# Patient Record
Sex: FEMALE | Race: WHITE | NOT HISPANIC OR LATINO | Employment: OTHER | URBAN - METROPOLITAN AREA
[De-identification: names, ages, dates, MRNs, and addresses within clinical notes are randomized per-mention and may not be internally consistent; named-entity substitution may affect disease eponyms.]

---

## 2017-01-04 ENCOUNTER — ANTI-COAG VISIT (OUTPATIENT)
Dept: CARDIOLOGY | Facility: CLINIC | Age: 67
End: 2017-01-04

## 2017-01-04 DIAGNOSIS — Z79.01 LONG TERM CURRENT USE OF ANTICOAGULANT THERAPY: ICD-10-CM

## 2017-01-04 LAB — INR PPP: 1.5

## 2017-01-05 ENCOUNTER — DOCUMENTATION ONLY (OUTPATIENT)
Dept: FAMILY MEDICINE | Facility: CLINIC | Age: 67
End: 2017-01-05

## 2017-01-10 ENCOUNTER — DOCUMENTATION ONLY (OUTPATIENT)
Dept: FAMILY MEDICINE | Facility: CLINIC | Age: 67
End: 2017-01-10

## 2017-01-10 RX ORDER — IPRATROPIUM BROMIDE AND ALBUTEROL SULFATE 2.5; .5 MG/3ML; MG/3ML
SOLUTION RESPIRATORY (INHALATION)
Qty: 90 ML | Refills: 0 | Status: SHIPPED | OUTPATIENT
Start: 2017-01-10 | End: 2017-04-17 | Stop reason: SDUPTHER

## 2017-01-10 NOTE — PROGRESS NOTES
Pre-Visit Chart Review  For Appointment Scheduled on 01/11/2017    Health Maintenance Due   Topic Date Due    DEXA SCAN  11/15/1991    Zoster Vaccine  11/15/2011    Colonoscopy  02/01/2016

## 2017-01-11 ENCOUNTER — DOCUMENTATION ONLY (OUTPATIENT)
Dept: FAMILY MEDICINE | Facility: CLINIC | Age: 67
End: 2017-01-11

## 2017-01-11 ENCOUNTER — ANTI-COAG VISIT (OUTPATIENT)
Dept: CARDIOLOGY | Facility: CLINIC | Age: 67
End: 2017-01-11

## 2017-01-11 DIAGNOSIS — Z79.01 LONG TERM CURRENT USE OF ANTICOAGULANT THERAPY: ICD-10-CM

## 2017-01-11 LAB — INR PPP: 2.6

## 2017-01-11 NOTE — PROGRESS NOTES
Pre-Visit Chart Review  For Appointment Scheduled on 01/12/2017    Health Maintenance Due   Topic Date Due    DEXA SCAN  11/15/1991    Zoster Vaccine  11/15/2011    Colonoscopy  02/01/2016

## 2017-01-12 ENCOUNTER — OFFICE VISIT (OUTPATIENT)
Dept: FAMILY MEDICINE | Facility: CLINIC | Age: 67
End: 2017-01-12
Payer: MEDICARE

## 2017-01-12 VITALS
WEIGHT: 293 LBS | TEMPERATURE: 98 F | DIASTOLIC BLOOD PRESSURE: 59 MMHG | HEART RATE: 79 BPM | BODY MASS INDEX: 44.41 KG/M2 | SYSTOLIC BLOOD PRESSURE: 127 MMHG | HEIGHT: 68 IN

## 2017-01-12 DIAGNOSIS — J02.9 ACUTE PHARYNGITIS, UNSPECIFIED ETIOLOGY: Primary | ICD-10-CM

## 2017-01-12 DIAGNOSIS — H92.03 OTALGIA, BILATERAL: ICD-10-CM

## 2017-01-12 PROCEDURE — 3078F DIAST BP <80 MM HG: CPT | Mod: S$GLB,,, | Performed by: PHYSICIAN ASSISTANT

## 2017-01-12 PROCEDURE — 99999 PR PBB SHADOW E&M-EST. PATIENT-LVL III: CPT | Mod: PBBFAC,,, | Performed by: PHYSICIAN ASSISTANT

## 2017-01-12 PROCEDURE — 1159F MED LIST DOCD IN RCRD: CPT | Mod: S$GLB,,, | Performed by: PHYSICIAN ASSISTANT

## 2017-01-12 PROCEDURE — 3074F SYST BP LT 130 MM HG: CPT | Mod: S$GLB,,, | Performed by: PHYSICIAN ASSISTANT

## 2017-01-12 PROCEDURE — 99213 OFFICE O/P EST LOW 20 MIN: CPT | Mod: S$GLB,,, | Performed by: PHYSICIAN ASSISTANT

## 2017-01-12 PROCEDURE — 99499 UNLISTED E&M SERVICE: CPT | Mod: S$GLB,,, | Performed by: PHYSICIAN ASSISTANT

## 2017-01-12 NOTE — MR AVS SNAPSHOT
[facility]  [address] [address]  Phone: [phone]  Fax: [phone]                  Nelly Tian   2017 12:20 PM   Office Visit    Description:  Female : 1950   Provider:  JIGNESH Su   Department:  [facility]           Reason for Visit     Otalgia     Sore Throat           Diagnoses this Visit        Comments    Acute pharyngitis, unspecified etiology    -  Primary     Otalgia, bilateral                To Do List           Future Appointments        Provider Department Dept Phone    2017 10:00 AM JIGNESH Su [facility] [phone]    2/10/2017 2:00 PM SAM ARREOLA [facility] [phone]    3/2/2017 9:00 AM [facility] [phone]    3/9/2017 11:00 AM Laura Ramos MD [facility] [phone]    3/29/2017 11:00 AM JIGNESH Velasquez-C [facility] [phone]      Goals (5 Years of Data)     None      Follow-Up and Disposition     Return in about 1 week (around 2017).       These Medications        Disp Refills Start End    (Magic mouthwash) 1:1:1 Benadryl 12.5mg/5ml liq, aluminum & magnesium hydroxide-simehticone (Maalox), lidocaine viscous 2% 450 mL 0 2017     Swish and spit 15 mLs every 4 (four) hours as needed. - Swish & Spit    Pharmacy: [facility] [id number] Ph #: [phone]         [facility] On Call     [facility] -  Assistance  Registered nurses in the [facility] provide clinical advisement, health education, appointment booking, and other advisory services.  Call for this free service at [phone].             Medications           Message regarding Medications     Verify the changes and/or additions to your medication regime listed below are the same as discussed with your clinician today.  If  any of these changes or additions are incorrect, please notify your healthcare provider.        START taking these NEW medications        Refills    (Magic mouthwash) 1:1:1 Benadryl 12.5mg/5ml liq, aluminum & magnesium hydroxide-simehticone (Maalox), lidocaine viscous 2% 0    Sig: Swish and spit 15 mLs every 4 (four) hours as needed.    Class: Print    Route: Swish & Spit      STOP taking these medications     ALCOHOL PREP PADS PadM            Verify that the below list of medications is an accurate representation of the medications you are currently taking.  If none reported, the list may be blank. If incorrect, please contact your healthcare provider. Carry this list with you in case of emergency.           Current Medications     acetaminophen (TYLENOL) 325 MG tablet Take 2 tablets (650 mg total) by mouth every 6 (six) hours as needed.    albuterol (VENTOLIN HFA) 90 mcg/actuation inhaler INHALE TWO PUFFS BY MOUTH EVERY 6 HOURS AS NEEDED FOR WHEEZING    albuterol-ipratropium 2.5mg-0.5mg/3mL (DUO-NEB) 0.5 mg-3 mg(2.5 mg base)/3 mL nebulizer solution INHALE THE CONTENTS OF 1 VIAL VIA NEBULIZER EVERY 6 HOURS AS NEEDED FOR  WHEEZING (DO NOT USE WITH ALBUTEROL INHALER)    atorvastatin (LIPITOR) 20 MG tablet Take 1 tablet (20 mg total) by mouth once daily.    blood sugar diagnostic (TRUE METRIX GLUCOSE TEST STRIP) Strp Use to test blood sugar three times a day   DX:E11.9    blood-glucose meter (TRUE METRIX AIR GLUCOSE METER) kit Use as instructed to test blood sugar   DX:E11.9    budesonide (PULMICORT) 0.5 mg/2 mL nebulizer solution Take 2 mLs (0.5 mg total) by nebulization 2 (two) times daily.    CALCIUM CARBONATE/VITAMIN D3 (CALCIUM 500 WITH D ORAL) Twice a day    clonazePAM (KLONOPIN) 1 MG tablet Take 1 tablet (1 mg total) by mouth 2 (two) times daily as needed for Anxiety.    DOCOSAHEXANOIC ACID/EPA (FISH OIL ORAL) Twice a day    escitalopram oxalate (LEXAPRO) 10 MG tablet TAKE 1 TABLET ONE TIME DAILY    ferrous  gluconate (FERGON) 324 MG tablet Take 1 tablet (324 mg total) by mouth daily with breakfast.    furosemide (LASIX) 40 MG tablet TAKE ONE TABLET ONCE DAILY FOR FLUID OVERLOAD    gabapentin (NEURONTIN) 600 MG tablet Take 1 tablet (600 mg total) by mouth 2 (two) times daily.    hydrocodone-acetaminophen 10-325mg (NORCO)  mg Tab Take 1 tablet by mouth 4 (four) times daily as needed.    lancets (TRUEPLUS LANCETS) 33 gauge Misc 1 lancet by Misc.(Non-Drug; Combo Route) route 3 (three) times daily. To test blood sugar   DX: E11.9    levalbuterol (XOPENEX) 0.63 mg/3 mL nebulizer solution INHALE THE CONTENTS OF 1 VIAL BY NEBULIZATION 3 (THREE) TIMES DAILY.    lisinopril (PRINIVIL,ZESTRIL) 20 MG tablet Take 1 tablet (20 mg total) by mouth once daily.    meloxicam (MOBIC) 7.5 MG tablet TAKE 1 TABLET ONE TIME DAILY    metformin (GLUCOPHAGE) 500 MG tablet TAKE 1 TABLET TWICE DAILY WITH MEALS    metoprolol succinate (TOPROL-XL) 25 MG 24 hr tablet Take 0.5 tablets (12.5 mg total) by mouth once daily.    multivitamin (THERAGRAN) tablet Take 1 tablet by mouth once daily.    polyethylene glycol (GLYCOLAX) 17 gram/dose powder MIX 1 CAPFUL (17GM) IN LIQUID AND DRINK BEFORE BREAKFAST    silver sulfADIAZINE 1% (SILVADENE) 1 % cream Apply topically 2 (two) times daily.    temazepam (RESTORIL) 15 mg Cap TAKE 1 CAPSULE ONE TIME DAILY    tiotropium (SPIRIVA WITH HANDIHALER) 18 mcg inhalation capsule INHALE THE CONTENTS OF 1 CAPSULE EVERY DAY    urea (CARMOL) 40 % Crea Apply topically once daily.    (Magic mouthwash) 1:1:1 Benadryl 12.5mg/5ml liq, aluminum & magnesium hydroxide-simehticone (Maalox), lidocaine viscous 2% Swish and spit 15 mLs every 4 (four) hours as needed.    warfarin (COUMADIN) 7.5 MG tablet Take 1 tablet (7.5 mg total) by mouth every Mon, Wed, Fri.           Clinical Reference Information           Vital Signs - Last Recorded  Most recent update: 1/12/2017 12:39 PM by Jessy Lechuga MA    BP Pulse Temp Ht Wt BMI     "(!) 127/59 (BP Location: Right arm, Patient Position: Sitting, BP Method: Automatic) 79 98.2 °F (36.8 °C) (Oral) 5' 8" (1.727 m) (!) 157.4 kg (347 lb 0.1 oz) 52.76 kg/m2      Blood Pressure          Most Recent Value    BP  (!)  127/59      Allergies as of 1/12/2017     Dilaudid [Hydromorphone]      Immunizations Administered on Date of Encounter - 1/12/2017     None      Instructions      tylenol 325 mg 2 tablets every 4-6 hours   Claritin 10 mg daily     Be sure to rinse out your mouth after inhaler use        Smoking Cessation     If you would like to quit smoking:   You may be eligible for free services if you are a [place] resident and started smoking cigarettes before September 1, 1988.  Call the [employer]oking [employer]ess[employer] ([employer]) toll free at [phone] [phone] or ([phone] 775-2039.   Call 0-269-QUIT-NOW if you do not meet the above criteria.            "

## 2017-01-12 NOTE — PATIENT INSTRUCTIONS
tylenol 325 mg 2 tablets every 4-6 hours   Claritin 10 mg daily     Be sure to rinse out your mouth after inhaler use

## 2017-01-12 NOTE — PROGRESS NOTES
Subjective:       Patient ID: Nelly Tian is a 66 y.o. female.    Chief Complaint: Otalgia (bilateral x 3 days) and Sore Throat    Sore Throat    This is a new problem. The current episode started in the past 7 days. The problem has been unchanged. Associated symptoms include congestion and ear pain. Pertinent negatives include no abdominal pain, coughing, ear discharge, headaches, hoarse voice, shortness of breath or trouble swallowing. She has had no exposure to strep. She has tried nothing for the symptoms.     Review of Systems   Constitutional: Positive for chills. Negative for appetite change and fatigue.   HENT: Positive for congestion, ear pain, sneezing and sore throat. Negative for ear discharge, hoarse voice, nosebleeds, postnasal drip, rhinorrhea, sinus pressure, trouble swallowing and voice change.    Respiratory: Negative for cough, chest tightness, shortness of breath and wheezing.    Cardiovascular: Negative for chest pain, palpitations and leg swelling.   Gastrointestinal: Negative for abdominal pain and nausea.   Neurological: Negative for headaches.       Objective:      Physical Exam   Constitutional: She is cooperative. She does not have a sickly appearance. She does not appear ill. No distress.   Morbidly obese female on 2 L  O2 via NC    HENT:   Head: Normocephalic and atraumatic.   Right Ear: Tympanic membrane, external ear and ear canal normal.   Left Ear: Tympanic membrane, external ear and ear canal normal.   Nose: Mucosal edema present.   Mouth/Throat: Oropharynx is clear and moist. Mucous membranes are not dry. No oropharyngeal exudate, posterior oropharyngeal edema or posterior oropharyngeal erythema.   Cardiovascular: Normal rate, regular rhythm and normal heart sounds.    No murmur heard.  Pulmonary/Chest: Effort normal and breath sounds normal. She has no wheezes. She has no rales.   Lymphadenopathy:     She has no cervical adenopathy.   Neurological: She is alert.   Skin: Skin  is warm and dry.   Nursing note and vitals reviewed.      Assessment:       1. Acute pharyngitis, unspecified etiology    2. Otalgia, bilateral        Plan:       Nelly was seen today for otalgia and sore throat.    Diagnoses and all orders for this visit:    Acute pharyngitis, unspecified etiology    Otalgia, bilateral    Other orders  -     (Magic mouthwash) 1:1:1 Benadryl 12.5mg/5ml liq, aluminum & magnesium hydroxide-simehticone (Maalox), lidocaine viscous 2%; Swish and spit 15 mLs every 4 (four) hours as needed.

## 2017-01-13 RX ORDER — POLYETHYLENE GLYCOL 3350 17 G/17G
POWDER, FOR SOLUTION ORAL
Qty: 1530 G | Refills: 11 | Status: SHIPPED | OUTPATIENT
Start: 2017-01-13 | End: 2017-09-07 | Stop reason: SDUPTHER

## 2017-01-16 RX ORDER — TRAZODONE HYDROCHLORIDE 100 MG/1
TABLET ORAL
Qty: 90 TABLET | Refills: 4 | Status: SHIPPED | OUTPATIENT
Start: 2017-01-16 | End: 2017-03-09 | Stop reason: SDUPTHER

## 2017-01-16 RX ORDER — METHOCARBAMOL 750 MG/1
TABLET, FILM COATED ORAL
Qty: 360 TABLET | Refills: 1 | Status: SHIPPED | OUTPATIENT
Start: 2017-01-16 | End: 2017-05-10 | Stop reason: SDUPTHER

## 2017-01-16 RX ORDER — WARFARIN SODIUM 5 MG/1
TABLET ORAL
Qty: 90 TABLET | Refills: 1 | Status: ON HOLD | OUTPATIENT
Start: 2017-01-16 | End: 2017-04-11

## 2017-01-18 RX ORDER — TIOTROPIUM BROMIDE 18 UG/1
CAPSULE ORAL; RESPIRATORY (INHALATION)
Qty: 90 CAPSULE | Refills: 1 | Status: SHIPPED | OUTPATIENT
Start: 2017-01-18 | End: 2017-07-18 | Stop reason: ALTCHOICE

## 2017-01-18 RX ORDER — ALBUTEROL SULFATE 0.63 MG/3ML
SOLUTION RESPIRATORY (INHALATION)
Qty: 75 ML | Refills: 11 | Status: SHIPPED | OUTPATIENT
Start: 2017-01-18 | End: 2017-07-04 | Stop reason: SDUPTHER

## 2017-01-18 RX ORDER — BUDESONIDE 0.5 MG/2ML
INHALANT ORAL
Qty: 180 ML | Refills: 4 | Status: SHIPPED | OUTPATIENT
Start: 2017-01-18 | End: 2017-05-08 | Stop reason: SDUPTHER

## 2017-01-18 RX ORDER — LISINOPRIL 20 MG/1
TABLET ORAL
Qty: 90 TABLET | Refills: 1 | Status: ON HOLD | OUTPATIENT
Start: 2017-01-18 | End: 2017-04-11

## 2017-01-23 ENCOUNTER — TELEPHONE (OUTPATIENT)
Dept: FAMILY MEDICINE | Facility: CLINIC | Age: 67
End: 2017-01-23

## 2017-01-23 ENCOUNTER — ANTI-COAG VISIT (OUTPATIENT)
Dept: CARDIOLOGY | Facility: CLINIC | Age: 67
End: 2017-01-23

## 2017-01-23 DIAGNOSIS — Z79.01 LONG TERM CURRENT USE OF ANTICOAGULANT THERAPY: ICD-10-CM

## 2017-01-23 DIAGNOSIS — E11.610 DIABETIC NEUROGENIC ARTHROPATHY: Primary | ICD-10-CM

## 2017-01-23 LAB — INR PPP: 1.8

## 2017-01-23 NOTE — PROGRESS NOTES
pt stated dose as 7.5mg on saturdays only and 5mg aod; gave pt correct dose and pt voiced understanding; hh faxed

## 2017-01-23 NOTE — TELEPHONE ENCOUNTER
----- Message from Brissa Meyers sent at 1/23/2017 10:57 AM CST -----  Contact: Moon with [facility] [employer] at [phone]  [facility] at [phone], calling about break through pins an needles in both feet daily. Wanting to increase gabapentin dosage. Please advise. Thanks.

## 2017-01-23 NOTE — TELEPHONE ENCOUNTER
----- Message from Hannah Leung sent at 1/23/2017  1:33 PM CST -----  Contact: self  Patient called starting that [employer] mail order has been faxing a requesting for refill   She got a call from them today stating that they can not be refilled because the doctor is no longer with us     Please call  to advise.     Thanks

## 2017-01-23 NOTE — TELEPHONE ENCOUNTER
Spoke with Moon with [facility] who states patient is experiencing tingling and sensations of pins and needles to both feet on a daily basis. Mrs Mustafa is requesting the dosage of Gabapentin be increased as a result. Informed Mrs Mustafa her concerns would be forwarded to PCP for review. Mrs Mustafa verbalized understanding. LOV 1/12/17.

## 2017-01-23 NOTE — TELEPHONE ENCOUNTER
Spoke with patient who states she received an automated call from her mail order pharmacy stating her PCP is no longer with the company and they can not get refills of lisinopril, albuterol, and xopenex. Writer assured patient Dr. Bird is still with the company. Upon further inspection all the prescriptions listed above were already refilled for patient. Patient verbalized understanding. Encouraged patient to call with any further concerns.

## 2017-01-24 RX ORDER — GABAPENTIN 600 MG/1
600 TABLET ORAL 2 TIMES DAILY
Qty: 180 TABLET | Refills: 11 | Status: SHIPPED | OUTPATIENT
Start: 2017-01-24 | End: 2017-01-25 | Stop reason: SDUPTHER

## 2017-01-25 DIAGNOSIS — M43.12 SPONDYLOLISTHESIS OF CERVICAL REGION: ICD-10-CM

## 2017-01-25 DIAGNOSIS — M51.36 LUMBAR DEGENERATIVE DISC DISEASE: ICD-10-CM

## 2017-01-25 DIAGNOSIS — E11.610 DIABETIC NEUROGENIC ARTHROPATHY: ICD-10-CM

## 2017-01-25 RX ORDER — GABAPENTIN 600 MG/1
600 TABLET ORAL 2 TIMES DAILY
Qty: 180 TABLET | Refills: 11 | Status: SHIPPED | OUTPATIENT
Start: 2017-01-25 | End: 2017-01-30 | Stop reason: SDUPTHER

## 2017-01-25 RX ORDER — SILVER SULFADIAZINE 10 G/1000G
CREAM TOPICAL 2 TIMES DAILY
Qty: 85 G | Refills: 0 | Status: SHIPPED | OUTPATIENT
Start: 2017-01-25 | End: 2017-03-09 | Stop reason: ALTCHOICE

## 2017-01-25 NOTE — TELEPHONE ENCOUNTER
----- Message from Shira Aguirre sent at 1/25/2017  2:54 PM CST -----  Patient requested refill on  Sliverdene cream for bed sores, call into  pharmacy below. Please call patient at [phone] if you have any questions. Thank you.        [facility] [address] [address]  Phone: [phone] Fax: [phone]

## 2017-01-25 NOTE — TELEPHONE ENCOUNTER
Spoke with Moon at [facility] regarding new orders and recommendations for patient. Mrs Mustafa verbalized understanding of increase in Gabapentin, also stated she believed patient would be fine with addition of B-12 in medication regimen, also states patient's blood glucose readings have been fine. Encouraged Mrs Mustafa to call with any further concerns.

## 2017-01-25 NOTE — TELEPHONE ENCOUNTER
1.Gabapentin 600 tid.  2.Add Folbic ( beware may not be cover) , Please consider super B complex.  3.BS have to be below 140.if persistent elevated call back to discuss Trulicity.

## 2017-01-25 NOTE — TELEPHONE ENCOUNTER
----- Message from Bradford Arellano sent at 1/25/2017  4:11 PM CST -----  Contact: Home Health Nurse, Moon Vaughan and B-12 must be called into [facility] mail delivery. The pharmacy's won't fill the prescriptions. Please call Moon at [phone] if you have questions. Thanks.

## 2017-01-25 NOTE — TELEPHONE ENCOUNTER
----- Message from Zonia Mustafa sent at 1/25/2017 12:42 PM CST -----  Contact: [phone]  Calling asking for her rx of Hydrocodone to be refilled at ..    [facility] [address]  [address] [address]  Phone: [phone] Fax: [phone]

## 2017-01-26 DIAGNOSIS — M43.12 SPONDYLOLISTHESIS OF CERVICAL REGION: ICD-10-CM

## 2017-01-26 DIAGNOSIS — E11.610 DIABETIC NEUROGENIC ARTHROPATHY: ICD-10-CM

## 2017-01-26 DIAGNOSIS — M51.36 LUMBAR DEGENERATIVE DISC DISEASE: ICD-10-CM

## 2017-01-26 RX ORDER — GABAPENTIN 600 MG/1
600 TABLET ORAL 2 TIMES DAILY
Qty: 180 TABLET | Refills: 11 | Status: CANCELLED | OUTPATIENT
Start: 2017-01-26 | End: 2018-01-26

## 2017-01-26 NOTE — TELEPHONE ENCOUNTER
B12 and Gabapentin were sent to [facility] and patient wants them sent to [facility].  Patient wants Gabapentin changed to three times a day and mg increased, she can not currently fill the RX until it is changed to three times a day.

## 2017-01-26 NOTE — TELEPHONE ENCOUNTER
----- Message from Tika Beyer sent at 1/26/2017 10:32 AM CST -----  Contact: pt [phone]  Patient called Monday and asked for a Refill on her Gabapentin she states should be for 3 times a day or at a marlin dose  and Narco and Silvadene.  Call  Back [phone]

## 2017-01-28 RX ORDER — TEMAZEPAM 15 MG/1
CAPSULE ORAL
Qty: 90 CAPSULE | Refills: 0 | OUTPATIENT
Start: 2017-01-28

## 2017-01-28 RX ORDER — HYDROCODONE BITARTRATE AND ACETAMINOPHEN 10; 325 MG/1; MG/1
1 TABLET ORAL 4 TIMES DAILY PRN
Qty: 120 TABLET | Refills: 0 | Status: SHIPPED | OUTPATIENT
Start: 2017-01-28 | End: 2017-01-31 | Stop reason: SDUPTHER

## 2017-01-30 DIAGNOSIS — M43.12 SPONDYLOLISTHESIS OF CERVICAL REGION: ICD-10-CM

## 2017-01-30 DIAGNOSIS — M51.36 LUMBAR DEGENERATIVE DISC DISEASE: ICD-10-CM

## 2017-01-30 DIAGNOSIS — E11.610 DIABETIC NEUROGENIC ARTHROPATHY: ICD-10-CM

## 2017-01-31 RX ORDER — GABAPENTIN 600 MG/1
600 TABLET ORAL 3 TIMES DAILY
Qty: 270 TABLET | Refills: 3 | Status: SHIPPED | OUTPATIENT
Start: 2017-01-31 | End: 2017-04-20 | Stop reason: SDUPTHER

## 2017-01-31 RX ORDER — HYDROCODONE BITARTRATE AND ACETAMINOPHEN 10; 325 MG/1; MG/1
1 TABLET ORAL EVERY 8 HOURS PRN
Qty: 90 TABLET | Refills: 0 | Status: SHIPPED | OUTPATIENT
Start: 2017-01-31 | End: 2017-03-09 | Stop reason: SDUPTHER

## 2017-01-31 RX ORDER — HYDROCODONE BITARTRATE AND ACETAMINOPHEN 10; 325 MG/1; MG/1
1 TABLET ORAL 4 TIMES DAILY PRN
Qty: 120 TABLET | Refills: 0 | OUTPATIENT
Start: 2017-01-31

## 2017-02-06 ENCOUNTER — ANTI-COAG VISIT (OUTPATIENT)
Dept: CARDIOLOGY | Facility: CLINIC | Age: 67
End: 2017-02-06

## 2017-02-06 DIAGNOSIS — Z79.01 LONG TERM CURRENT USE OF ANTICOAGULANT THERAPY: ICD-10-CM

## 2017-02-06 LAB — INR PPP: 2.7

## 2017-02-06 NOTE — PROGRESS NOTES
lvm for pt informing her of dosing and next inr check date, pt was asked to return call to clinic to voice understanding.

## 2017-02-09 ENCOUNTER — DOCUMENTATION ONLY (OUTPATIENT)
Dept: FAMILY MEDICINE | Facility: CLINIC | Age: 67
End: 2017-02-09

## 2017-02-09 NOTE — PROGRESS NOTES
Pre-Visit Chart Review  For Appointment Scheduled on 02/10/2017      Health Maintenance Due   Topic Date Due    DEXA SCAN  11/15/1991    Zoster Vaccine  11/15/2011    Colonoscopy  02/01/2016

## 2017-02-14 ENCOUNTER — HOSPITAL ENCOUNTER (INPATIENT)
Facility: HOSPITAL | Age: 67
LOS: 5 days | Discharge: HOME HEALTH | DRG: 291 | End: 2017-02-19
Attending: EMERGENCY MEDICINE | Admitting: HOSPITALIST
Payer: MEDICARE

## 2017-02-14 DIAGNOSIS — J44.1 CHRONIC OBSTRUCTIVE PULMONARY DISEASE WITH ACUTE EXACERBATION: ICD-10-CM

## 2017-02-14 DIAGNOSIS — I48.20 CHRONIC ATRIAL FIBRILLATION: ICD-10-CM

## 2017-02-14 DIAGNOSIS — I50.9 ACUTE CONGESTIVE HEART FAILURE, UNSPECIFIED CONGESTIVE HEART FAILURE TYPE: ICD-10-CM

## 2017-02-14 DIAGNOSIS — J96.12 HYPERCAPNIC RESPIRATORY FAILURE, CHRONIC: ICD-10-CM

## 2017-02-14 DIAGNOSIS — I50.43 ACUTE ON CHRONIC COMBINED SYSTOLIC AND DIASTOLIC CONGESTIVE HEART FAILURE: Primary | ICD-10-CM

## 2017-02-14 PROBLEM — F33.1 MODERATE EPISODE OF RECURRENT MAJOR DEPRESSIVE DISORDER: Status: ACTIVE | Noted: 2017-02-14

## 2017-02-14 PROBLEM — I50.33 ACUTE ON CHRONIC DIASTOLIC CONGESTIVE HEART FAILURE: Status: ACTIVE | Noted: 2017-02-14

## 2017-02-14 LAB
ALBUMIN SERPL BCP-MCNC: 3.4 G/DL
ALP SERPL-CCNC: 76 U/L
ALT SERPL W/O P-5'-P-CCNC: 12 U/L
ANION GAP SERPL CALC-SCNC: 10 MMOL/L
ANISOCYTOSIS BLD QL SMEAR: SLIGHT
AST SERPL-CCNC: 24 U/L
BASOPHILS # BLD AUTO: 0 K/UL
BASOPHILS NFR BLD: 0 %
BILIRUB SERPL-MCNC: 0.5 MG/DL
BNP SERPL-MCNC: 172 PG/ML
BUN SERPL-MCNC: 7 MG/DL
CALCIUM SERPL-MCNC: 9 MG/DL
CHLORIDE SERPL-SCNC: 98 MMOL/L
CO2 SERPL-SCNC: 30 MMOL/L
CREAT SERPL-MCNC: 0.6 MG/DL
DIFFERENTIAL METHOD: ABNORMAL
EOSINOPHIL # BLD AUTO: 0.2 K/UL
EOSINOPHIL NFR BLD: 2.7 %
ERYTHROCYTE [DISTWIDTH] IN BLOOD BY AUTOMATED COUNT: 15.5 %
EST. GFR  (AFRICAN AMERICAN): >60 ML/MIN/1.73 M^2
EST. GFR  (NON AFRICAN AMERICAN): >60 ML/MIN/1.73 M^2
GLUCOSE SERPL-MCNC: 85 MG/DL
HCT VFR BLD AUTO: 37.2 %
HGB BLD-MCNC: 11.2 G/DL
HYPOCHROMIA BLD QL SMEAR: ABNORMAL
INR PPP: 2.2
LYMPHOCYTES # BLD AUTO: 1.7 K/UL
LYMPHOCYTES NFR BLD: 19.3 %
MCH RBC QN AUTO: 25 PG
MCHC RBC AUTO-ENTMCNC: 30.2 %
MCV RBC AUTO: 83 FL
MONOCYTES # BLD AUTO: 0.6 K/UL
MONOCYTES NFR BLD: 6.9 %
NEUTROPHILS # BLD AUTO: 6.4 K/UL
NEUTROPHILS NFR BLD: 71.1 %
PLATELET # BLD AUTO: 194 K/UL
PMV BLD AUTO: 8.4 FL
POCT GLUCOSE: 166 MG/DL (ref 70–110)
POIKILOCYTOSIS BLD QL SMEAR: SLIGHT
POTASSIUM SERPL-SCNC: 3.9 MMOL/L
PROT SERPL-MCNC: 7.1 G/DL
PROTHROMBIN TIME: 22 SEC
RBC # BLD AUTO: 4.49 M/UL
SODIUM SERPL-SCNC: 138 MMOL/L
TROPONIN I SERPL DL<=0.01 NG/ML-MCNC: 0.01 NG/ML
WBC # BLD AUTO: 8.9 K/UL

## 2017-02-14 PROCEDURE — 63600175 PHARM REV CODE 636 W HCPCS: Performed by: EMERGENCY MEDICINE

## 2017-02-14 PROCEDURE — 94761 N-INVAS EAR/PLS OXIMETRY MLT: CPT

## 2017-02-14 PROCEDURE — 80053 COMPREHEN METABOLIC PANEL: CPT

## 2017-02-14 PROCEDURE — 36415 COLL VENOUS BLD VENIPUNCTURE: CPT

## 2017-02-14 PROCEDURE — 84484 ASSAY OF TROPONIN QUANT: CPT

## 2017-02-14 PROCEDURE — 99284 EMERGENCY DEPT VISIT MOD MDM: CPT | Mod: 25

## 2017-02-14 PROCEDURE — 25000242 PHARM REV CODE 250 ALT 637 W/ HCPCS

## 2017-02-14 PROCEDURE — 85610 PROTHROMBIN TIME: CPT

## 2017-02-14 PROCEDURE — 93010 ELECTROCARDIOGRAM REPORT: CPT | Mod: ,,, | Performed by: INTERNAL MEDICINE

## 2017-02-14 PROCEDURE — 12000002 HC ACUTE/MED SURGE SEMI-PRIVATE ROOM

## 2017-02-14 PROCEDURE — 93005 ELECTROCARDIOGRAM TRACING: CPT

## 2017-02-14 PROCEDURE — 27000221 HC OXYGEN, UP TO 24 HOURS

## 2017-02-14 PROCEDURE — 25000003 PHARM REV CODE 250: Performed by: NURSE PRACTITIONER

## 2017-02-14 PROCEDURE — 83880 ASSAY OF NATRIURETIC PEPTIDE: CPT

## 2017-02-14 PROCEDURE — 25000003 PHARM REV CODE 250: Performed by: HOSPITALIST

## 2017-02-14 PROCEDURE — 96374 THER/PROPH/DIAG INJ IV PUSH: CPT

## 2017-02-14 PROCEDURE — 83036 HEMOGLOBIN GLYCOSYLATED A1C: CPT

## 2017-02-14 PROCEDURE — 85025 COMPLETE CBC W/AUTO DIFF WBC: CPT

## 2017-02-14 PROCEDURE — 25000003 PHARM REV CODE 250: Performed by: PHYSICIAN ASSISTANT

## 2017-02-14 PROCEDURE — 94640 AIRWAY INHALATION TREATMENT: CPT

## 2017-02-14 RX ORDER — MAGNESIUM SULFATE/D5W 2 G/50 ML
2 INTRAVENOUS SOLUTION, PIGGYBACK (ML) INTRAVENOUS ONCE
Status: COMPLETED | OUTPATIENT
Start: 2017-02-14 | End: 2017-02-15

## 2017-02-14 RX ORDER — IBUPROFEN 200 MG
24 TABLET ORAL
Status: DISCONTINUED | OUTPATIENT
Start: 2017-02-14 | End: 2017-02-15 | Stop reason: SDUPTHER

## 2017-02-14 RX ORDER — ATORVASTATIN CALCIUM 20 MG/1
20 TABLET, FILM COATED ORAL DAILY
Status: DISCONTINUED | OUTPATIENT
Start: 2017-02-15 | End: 2017-02-19 | Stop reason: HOSPADM

## 2017-02-14 RX ORDER — GABAPENTIN 300 MG/1
600 CAPSULE ORAL 2 TIMES DAILY
Status: DISCONTINUED | OUTPATIENT
Start: 2017-02-14 | End: 2017-02-19 | Stop reason: HOSPADM

## 2017-02-14 RX ORDER — CARISOPRODOL 350 MG/1
350 TABLET ORAL ONCE AS NEEDED
Status: ACTIVE | OUTPATIENT
Start: 2017-02-14 | End: 2017-02-14

## 2017-02-14 RX ORDER — IPRATROPIUM BROMIDE AND ALBUTEROL SULFATE 2.5; .5 MG/3ML; MG/3ML
SOLUTION RESPIRATORY (INHALATION)
Status: COMPLETED
Start: 2017-02-14 | End: 2017-02-14

## 2017-02-14 RX ORDER — IBUPROFEN 200 MG
24 TABLET ORAL
Status: DISCONTINUED | OUTPATIENT
Start: 2017-02-14 | End: 2017-02-19 | Stop reason: HOSPADM

## 2017-02-14 RX ORDER — CLONAZEPAM 1 MG/1
1 TABLET ORAL 2 TIMES DAILY PRN
Status: DISCONTINUED | OUTPATIENT
Start: 2017-02-15 | End: 2017-02-19 | Stop reason: HOSPADM

## 2017-02-14 RX ORDER — LISINOPRIL 10 MG/1
20 TABLET ORAL DAILY
Status: DISCONTINUED | OUTPATIENT
Start: 2017-02-15 | End: 2017-02-19 | Stop reason: HOSPADM

## 2017-02-14 RX ORDER — WARFARIN SODIUM 5 MG/1
5 TABLET ORAL DAILY
Status: DISCONTINUED | OUTPATIENT
Start: 2017-02-14 | End: 2017-02-19 | Stop reason: HOSPADM

## 2017-02-14 RX ORDER — GLUCAGON 1 MG
1 KIT INJECTION
Status: DISCONTINUED | OUTPATIENT
Start: 2017-02-14 | End: 2017-02-14 | Stop reason: SDUPTHER

## 2017-02-14 RX ORDER — TRAZODONE HYDROCHLORIDE 50 MG/1
100 TABLET ORAL NIGHTLY PRN
Status: DISCONTINUED | OUTPATIENT
Start: 2017-02-14 | End: 2017-02-15

## 2017-02-14 RX ORDER — FUROSEMIDE 10 MG/ML
40 INJECTION INTRAMUSCULAR; INTRAVENOUS
Status: COMPLETED | OUTPATIENT
Start: 2017-02-14 | End: 2017-02-14

## 2017-02-14 RX ORDER — INSULIN ASPART 100 [IU]/ML
0-5 INJECTION, SOLUTION INTRAVENOUS; SUBCUTANEOUS
Status: DISCONTINUED | OUTPATIENT
Start: 2017-02-14 | End: 2017-02-19 | Stop reason: HOSPADM

## 2017-02-14 RX ORDER — ESCITALOPRAM OXALATE 10 MG/1
10 TABLET ORAL DAILY
Status: DISCONTINUED | OUTPATIENT
Start: 2017-02-15 | End: 2017-02-19 | Stop reason: HOSPADM

## 2017-02-14 RX ORDER — IBUPROFEN 200 MG
16 TABLET ORAL
Status: DISCONTINUED | OUTPATIENT
Start: 2017-02-14 | End: 2017-02-19 | Stop reason: HOSPADM

## 2017-02-14 RX ORDER — IPRATROPIUM BROMIDE AND ALBUTEROL SULFATE 2.5; .5 MG/3ML; MG/3ML
3 SOLUTION RESPIRATORY (INHALATION)
Status: DISCONTINUED | OUTPATIENT
Start: 2017-02-14 | End: 2017-02-14 | Stop reason: SDUPTHER

## 2017-02-14 RX ORDER — IBUPROFEN 200 MG
16 TABLET ORAL
Status: DISCONTINUED | OUTPATIENT
Start: 2017-02-14 | End: 2017-02-14 | Stop reason: SDUPTHER

## 2017-02-14 RX ORDER — HYDROCODONE BITARTRATE AND ACETAMINOPHEN 10; 325 MG/1; MG/1
1 TABLET ORAL EVERY 8 HOURS PRN
Status: DISCONTINUED | OUTPATIENT
Start: 2017-02-14 | End: 2017-02-19 | Stop reason: HOSPADM

## 2017-02-14 RX ORDER — IPRATROPIUM BROMIDE AND ALBUTEROL SULFATE 2.5; .5 MG/3ML; MG/3ML
3 SOLUTION RESPIRATORY (INHALATION)
Status: DISCONTINUED | OUTPATIENT
Start: 2017-02-14 | End: 2017-02-19 | Stop reason: HOSPADM

## 2017-02-14 RX ORDER — MAGNESIUM SULFATE 500 MG/ML
2 INJECTION, SOLUTION INTRAMUSCULAR; INTRAVENOUS ONCE
Status: DISCONTINUED | OUTPATIENT
Start: 2017-02-14 | End: 2017-02-14

## 2017-02-14 RX ORDER — GLUCAGON 1 MG
1 KIT INJECTION
Status: DISCONTINUED | OUTPATIENT
Start: 2017-02-14 | End: 2017-02-19 | Stop reason: HOSPADM

## 2017-02-14 RX ORDER — FUROSEMIDE 10 MG/ML
40 INJECTION INTRAMUSCULAR; INTRAVENOUS 2 TIMES DAILY
Status: DISCONTINUED | OUTPATIENT
Start: 2017-02-14 | End: 2017-02-17

## 2017-02-14 RX ORDER — WARFARIN SODIUM 5 MG/1
5 TABLET ORAL DAILY
Status: DISCONTINUED | OUTPATIENT
Start: 2017-02-15 | End: 2017-02-14

## 2017-02-14 RX ADMIN — FUROSEMIDE 40 MG: 10 INJECTION, SOLUTION INTRAMUSCULAR; INTRAVENOUS at 04:02

## 2017-02-14 RX ADMIN — HYDROCODONE BITARTRATE AND ACETAMINOPHEN 1 TABLET: 10; 325 TABLET ORAL at 10:02

## 2017-02-14 RX ADMIN — TRAZODONE HYDROCHLORIDE 100 MG: 50 TABLET ORAL at 10:02

## 2017-02-14 RX ADMIN — WARFARIN SODIUM 5 MG: 5 TABLET ORAL at 11:02

## 2017-02-14 RX ADMIN — CLONAZEPAM 1 MG: 1 TABLET ORAL at 11:02

## 2017-02-14 RX ADMIN — Medication 2 G: at 10:02

## 2017-02-14 RX ADMIN — IPRATROPIUM BROMIDE AND ALBUTEROL SULFATE 3 ML: .5; 3 SOLUTION RESPIRATORY (INHALATION) at 07:02

## 2017-02-14 RX ADMIN — GABAPENTIN 600 MG: 300 CAPSULE ORAL at 10:02

## 2017-02-14 NOTE — ED NOTES
Hospitalist service @ bedside (nurse practitioner) for initial eval. S/P ambulated to BP with walker - Tolerated reasonably well, somewhat winded on return.

## 2017-02-14 NOTE — IP AVS SNAPSHOT
[address] [place] [address]  Phone: [phone]           Patient Discharge Instructions     Our goal is to set you up for success. This packet includes information on your condition, medications, and your home care. It will help you to care for yourself so you don't get sicker and need to go back to the hospital.     Please ask your nurse if you have any questions.        There are many details to remember when preparing to leave the hospital. Here is what you will need to do:    1. Take your medicine. If you are prescribed medications, review your Medication List in the following pages. You may have new medications to  at the pharmacy and others that you'll need to stop taking. Review the instructions for how and when to take your medications. Talk with your doctor or nurses if you are unsure of what to do.     2. Go to your follow-up appointments. Specific follow-up information is listed in the following pages. Your may be contacted by a transition nurse or clinical provider about future appointments. Be sure we have all of the phone numbers to reach you, if needed. Please contact your provider's office if you are unable to make an appointment.     3. Watch for warning signs. Your doctor or nurse will give you detailed warning signs to watch for and when to call for assistance. These instructions may also include educational information about your condition. If you experience any of warning signs to your health, call your doctor.               ** Verify the list of medication(s) below is accurate and up to date. Carry this with you in case of emergency. If your medications have changed, please notify your healthcare provider.             Medication List      START taking these medications        Additional Info    Begin Date AM Noon PM Bedtime    ascorbic acid (vitamin C) 500 MG tablet   Commonly known as:  VITAMIN C   Refills:  0   Dose:  500 mg    Last  time this was given:  500 mg on 2/18/2017  8:12 PM   Instructions:  Take 1 tablet (500 mg total) by mouth every evening.                               ferrous sulfate 325 (65 FE) MG EC tablet   Refills:  0   Dose:  325 mg    Last time this was given:  325 mg on 2/19/2017  9:36 AM   Instructions:  Take 1 tablet (325 mg total) by mouth 2 (two) times daily with meals.                               fluconazole 150 MG Tab   Commonly known as:  DIFLUCAN   Quantity:  2 tablet   Refills:  0   Dose:  150 mg    Last time this was given:  150 mg on 2/19/2017  9:31 AM   Instructions:  Take 1 tablet (150 mg total) by mouth once daily.                               miconazole NITRATE 2 % 2 % top powder   Commonly known as:  MICOTIN   Refills:  0    Last time this was given:  2/19/2017  9:38 AM   Instructions:  Apply topically 2 (two) times daily. Skin folds                            potassium chloride SA 20 MEQ tablet   Commonly known as:  K-DUR,KLOR-CON   Quantity:  30 tablet   Refills:  1   Dose:  20 mEq    Last time this was given:  20 mEq on 2/19/2017  9:37 AM   Instructions:  Take 1 tablet (20 mEq total) by mouth once daily.                                 CHANGE how you take these medications        Additional Info    Begin Date AM Noon PM Bedtime    furosemide 40 MG tablet   Commonly known as:  LASIX   Quantity:  60 tablet   Refills:  0   Dose:  40 mg   What changed:    - how much to take  - how to take this  - when to take this  - additional instructions    Last time this was given:  40 mg on 2/19/2017  9:53 AM   Instructions:  Take 1 tablet (40 mg total) by mouth 2 (two) times daily.                               gabapentin 600 MG tablet   Commonly known as:  NEURONTIN   Quantity:  270 tablet   Refills:  3   Dose:  600 mg   What changed:  when to take this    Instructions:  Take 1 tablet (600 mg total) by mouth 3 (three) times daily.                               hydrocodone-acetaminophen 10-325mg  mg Tab    Commonly known as:  NORCO   Quantity:  90 tablet   Refills:  0   Dose:  1 tablet   What changed:  reasons to take this    Last time this was given:  1 tablet on 2/19/2017 11:27 AM   Instructions:  Take 1 tablet by mouth every 8 (eight) hours as needed.                            metoprolol succinate 25 MG 24 hr tablet   Commonly known as:  TOPROL-XL   Quantity:  45 tablet   Refills:  3   Dose:  12.5 mg   Indications:  Hold  for heart rate less than 60 or DBP < 60   What changed:  additional instructions    Last time this was given:  12.5 mg on 2/19/2017  9:53 AM   Instructions:  Take 0.5 tablets (12.5 mg total) by mouth once daily.                               warfarin 5 MG tablet   Commonly known as:  COUMADIN   Quantity:  90 tablet   Refills:  1   What changed:  See the new instructions.    Last time this was given:  5 mg on 2/18/2017  5:34 PM   Instructions:  TAKE ONE TABLET EVERY DAY OR AS DIRECTED BY COUMADIN CLINIC                                 CONTINUE taking these medications        Additional Info    Begin Date AM Noon PM Bedtime    acetaminophen 325 MG tablet   Commonly known as:  TYLENOL   Refills:  0   Dose:  650 mg    Instructions:  Take 2 tablets (650 mg total) by mouth every 6 (six) hours as needed.                            * albuterol 90 mcg/actuation inhaler   Commonly known as:  VENTOLIN HFA   Quantity:  18 each   Refills:  3    Instructions:  INHALE TWO PUFFS BY MOUTH EVERY 6 HOURS AS NEEDED FOR WHEEZING                            * albuterol 0.63 mg/3 mL Nebu   Commonly known as:  ACCUNEB   Quantity:  75 mL   Refills:  11    Instructions:  INHALE THE CONTENTS OF 1 VIAL  BY  NEBULIZER EVERY 6 HOURS AS NEEDED                            albuterol-ipratropium 2.5mg-0.5mg/3mL 0.5 mg-3 mg(2.5 mg base)/3 mL nebulizer solution   Commonly known as:  DUO-NEB   Quantity:  90 mL   Refills:  0    Last time this was given:  3 mLs on 2/19/2017  7:29 AM   Instructions:  INHALE THE CONTENTS OF 1 VIAL VIA  NEBULIZER EVERY 6 HOURS AS NEEDED FOR  WHEEZING (DO NOT USE WITH ALBUTEROL INHALER)                            atorvastatin 20 MG tablet   Commonly known as:  LIPITOR   Quantity:  90 tablet   Refills:  3   Dose:  20 mg    Last time this was given:  20 mg on 2/19/2017  9:37 AM   Instructions:  Take 1 tablet (20 mg total) by mouth once daily.                               blood sugar diagnostic Strp   Commonly known as:  TRUE METRIX GLUCOSE TEST STRIP   Quantity:  300 strip   Refills:  3    Instructions:  Use to test blood sugar three times a day   DX:E11.9                            blood-glucose meter kit   Commonly known as:  TRUE METRIX AIR GLUCOSE METER   Quantity:  1 each   Refills:  0    Instructions:  Use as instructed to test blood sugar   DX:E11.9                            budesonide 0.5 mg/2 mL nebulizer solution   Commonly known as:  PULMICORT   Quantity:  180 mL   Refills:  4    Instructions:  INHALE THE CONTENTS OF 1 VIAL VIA NEBULIZER EVERY DAY                            clonazePAM 1 MG tablet   Commonly known as:  KLONOPIN   Quantity:  60 tablet   Refills:  4   Dose:  1 mg    Last time this was given:  1 mg on 2/19/2017  9:57 AM   Instructions:  Take 1 tablet (1 mg total) by mouth 2 (two) times daily as needed for Anxiety.                            escitalopram oxalate 10 MG tablet   Commonly known as:  LEXAPRO   Quantity:  90 tablet   Refills:  3    Instructions:  TAKE 1 TABLET ONE TIME DAILY                               lancets 33 gauge Misc   Commonly known as:  TRUEPLUS LANCETS   Quantity:  300 each   Refills:  3   Dose:  1 lancet    Instructions:  1 lancet by Misc.(Non-Drug; Combo Route) route 3 (three) times daily. To test blood sugar   DX: E11.9                            levalbuterol 0.63 mg/3 mL nebulizer solution   Commonly known as:  XOPENEX   Quantity:  792 mL   Refills:  3    Instructions:  INHALE THE CONTENTS OF 1 VIAL BY NEBULIZATION 3 (THREE) TIMES DAILY.                             lisinopril 20 MG tablet   Commonly known as:  PRINIVIL,ZESTRIL   Quantity:  90 tablet   Refills:  1    Last time this was given:  20 mg on 2/19/2017  9:52 AM   Instructions:  TAKE 1 TABLET ONE TIME DAILY                               metformin 500 MG tablet   Commonly known as:  GLUCOPHAGE   Quantity:  180 tablet   Refills:  3    Instructions:  TAKE 1 TABLET TWICE DAILY WITH MEALS                               methocarbamol 750 MG Tab   Commonly known as:  ROBAXIN   Quantity:  360 tablet   Refills:  1    Instructions:  TAKE 1 TABLET FOUR TIMES DAILY                               multivitamin tablet   Commonly known as:  THERAGRAN   Refills:  0   Dose:  1 tablet    Last time this was given:  1 tablet on 2/19/2017  9:36 AM   Instructions:  Take 1 tablet by mouth once daily.                               polyethylene glycol 17 gram/dose powder   Commonly known as:  GLYCOLAX   Quantity:  1530 g   Refills:  11    Instructions:  MIX 17 GRAMS IN LIQUID AND DRINK EVERY DAY AS NEEDED                            silver sulfADIAZINE 1% 1 % cream   Commonly known as:  SILVADENE   Quantity:  85 g   Refills:  0    Instructions:  Apply topically 2 (two) times daily.                            SPIRIVA WITH HANDIHALER 18 mcg inhalation capsule   Quantity:  90 capsule   Refills:  1   Generic drug:  tiotropium    Instructions:  INHALE THE CONTENTS OF 1 CAPSULE EVERY DAY                            temazepam 15 mg Cap   Commonly known as:  RESTORIL   Quantity:  90 capsule   Refills:  3    Instructions:  TAKE 1 CAPSULE ONE TIME DAILY                               trazodone 100 MG tablet   Commonly known as:  DESYREL   Quantity:  90 tablet   Refills:  4    Last time this was given:  100 mg on 2/14/2017 10:04 PM   Instructions:  TAKE 1 TABLET EVERY NIGHT AS NEEDED  FOR  INSOMNIA                            * Notice:  This list has 2 medication(s) that are the same as other medications prescribed for you. Read the directions carefully, and  ask your doctor or other care provider to review them with you.      STOP taking these medications     meloxicam 7.5 MG tablet   Commonly known as:  MOBIC            Where to Get Your Medications      These medications were sent to [facility] [place] - [address]  [address] [place] [address]     Phone:  [phone]     fluconazole 150 MG Tab    furosemide 40 MG tablet    potassium chloride SA 20 MEQ tablet         You can get these medications from any pharmacy     You don't need a prescription for these medications     ascorbic acid (vitamin C) 500 MG tablet    ferrous sulfate 325 (65 FE) MG EC tablet    miconazole NITRATE 2 % 2 % top powder                  Please bring to all follow up appointments:    1. A copy of your discharge instructions.  2. All medicines you are currently taking in their original bottles.  3. Identification and insurance card.    Please arrive 15 minutes ahead of scheduled appointment time.    Please call 24 hours in advance if you must reschedule your appointment and/or time.        Your Scheduled Appointments     Mar 02, 2017  9:00 AM CST   Non-Fasting Lab with [facility] ([facility])    [address] [facility] [address]   [phone]            Mar 09, 2017 11:00 AM CST   Established Patient Visit with Laura Ramos MD   [facility] ([facility])    [address]  [facility] [address]   [phone]            Mar 29, 2017 11:00 AM CDT   Established Patient Visit with Marlyn Moctezuma PA-C   [facility]    [address] [facility] [address]   [phone]            Jul 18, 2017 11:30 AM CDT   Established Patient Visit with Constantine Bird MD   [facility] ([facility]    [address]  [facility] [address] [phone]              Follow-up Information     Follow up with  Outpatient Complex Case Management.    Why:  Your assigned outpatient  is Jayde Reyes [phone]    Contact information:    [address] [facility] [address]  [facility] [id number] [phone]          Follow up with Constantine Bird MD In 1 week.    Specialty:  Family Medicine    Why:  hospital follow up    Contact information:    [address]  [place] [address]  [phone]          Follow up with Chu Mack MD In 3 weeks.    Specialty:  Cardiology    Why:  hospital follow up    Contact information:    [address]  [address]  [place] [address]  [phone]          Follow up with Julianna Tolbert MD In 3 weeks.    Specialty:  Pulmonary Disease    Why:  hospital follow up    Contact information:    [address]  [address]  [facility] [address] [phone]          Follow up with [facility].    Specialty:  Home Health Services    Why:  SN/PT, BMP and PT/INR weekly x 3 weeks    Contact information:    [address]   [address]  [facility] [address] [phone]        Referrals     Future Orders    Ambulatory referral to Outpatient Case Management     Questions:    Does the patient have a chronic or uncontrolled disease process?:   Comment - yes    Does the patient have a new diagnosis of a catastrophic or life altering illness/treatment?:   Comment - no    Does the patient have any psycho-social issues that may affect their ability to adhere to treatment plan?:      Does patient have any behaviors or circumstances that may impede ability to adhere to treatment plan?:      Is patient at risk for admission/readmission?:   Comment - yes    Referral to Home health         Discharge Instructions     Future Orders    Activity as tolerated     Call MD for:  difficulty breathing or increased cough     Call MD for:  severe uncontrolled pain     Diet general     Questions:    Total calories:      Fat restriction, if any:      Protein restriction, if any:      Na restriction,  if any:      Fluid restriction:      Additional restrictions:  Diabetic 1800        Primary Diagnosis     Your primary diagnosis was:  Heart Failure      Admission Information     Date & Time Provider Department CSN    2/14/2017  2:32 PM Jere Navarro MD [facility] [id number]      Care Providers     Provider Role Specialty Primary office phone    Jere Navarro MD Attending Provider Family Medicine [phone]    Chu Mack MD Consulting Physician  Cardiology [phone]    Meliton Arellano MD Consulting Physician  Cardiology  [phone]    Manuel Flower MD Consulting Physician  Pulmonary Disease [phone]    Julianna Tolbert MD Consulting Physician  Pulmonary Disease [phone]      Your Vitals Were     BP Pulse Temp Resp Height Weight    130/76 86 97.9 °F (36.6 °C) (Oral) 18 5' (1.524 m) 157.5 kg (347 lb 3.6 oz)    SpO2 BMI             98% 67.81 kg/m2         Recent Lab Values        2/24/2014 7/3/2014 10/28/2014 9/10/2015 3/22/2016 10/20/2016 10/26/2016 2/14/2017      8:38 AM  8:31 AM  8:00 AM 10:02 AM  7:46 AM 11:45 AM 11:11 AM  3:22 PM    A1C 5.8 5.5 5.6 5.6 5.1 6.2 6.3 (H) 6.1    Comment for A1C at 11:45 AM on 10/20/2016:  According to ADA guidelines, hemoglobin A1C <7.0% represents  optimal control in non-pregnant diabetic patients.  Different  metrics may apply to specific populations.   Standards of Medical Care in Diabetes - 2016.  For the purpose of screening for the presence of diabetes:  <5.7%     Consistent with the absence of diabetes  5.7-6.4%  Consistent with increasing risk for diabetes   (prediabetes)  >or=6.5%  Consistent with diabetes  Currently no consensus exists for use of hemoglobin A1C  for diagnosis of diabetes for children.      Comment for A1C at 11:11 AM on 10/26/2016:  According to ADA guidelines, hemoglobin A1C <7.0% represents  optimal control in non-pregnant diabetic patients.  Different  metrics may apply to specific populations.    Standards of Medical Care in Diabetes - 2016.  For the purpose of screening for the presence of diabetes:  <5.7%     Consistent with the absence of diabetes  5.7-6.4%  Consistent with increasing risk for diabetes   (prediabetes)  >or=6.5%  Consistent with diabetes  Currently no consensus exists for use of hemoglobin A1C  for diagnosis of diabetes for children.      Comment for A1C at  3:22 PM on 2/14/2017:  According to ADA guidelines, hemoglobin A1C <7.0% represents  optimal control in non-pregnant diabetic patients.  Different  metrics may apply to specific populations.   Standards of Medical Care in Diabetes - 2016.  For the purpose of screening for the presence of diabetes:  <5.7%     Consistent with the absence of diabetes  5.7-6.4%  Consistent with increasing risk for diabetes   (prediabetes)  >or=6.5%  Consistent with diabetes  Currently no consensus exists for use of hemoglobin A1C  for diagnosis of diabetes for children.        Allergies as of 2/19/2017        Reactions    Dilaudid [Hydromorphone] Anaphylaxis    Other reaction(s): Anaphylaxis  Other reaction(s): Unknown      [facility]     [facility] Nurse Care Line - 24/7 Assistance  Unless otherwise directed by your provider, please contact [facility], our nurse care line that is available for 24/7 assistance.     Registered nurses in the [facility] provide clinical advisement, health education, appointment booking, and other advisory services.  Call for this free service at [phone].        Advance Directives     An advance directive is a document which, in the event you are no longer able to make decisions for yourself, tells your healthcare team what kind of treatment you do or do not want to receive, or who you would like to make those decisions for you.  If you do not currently have an advance directive, [facility] encourages you to create one.  For more information call:  [phone]), [phone]  ([phone]),  or log on to [email or web address].        Smoking Cessation     If you would like to quit smoking:   You may be eligible for free services if you are a [place] resident and started smoking cigarettes before September 1, 1988.  Call the Smoking Cessation Trust (SCT) toll free at (937) 686-3696 or (949) 034-0772.   Call 1-800-QUIT-NOW if you do not meet the above criteria.            Language Assistance Services     ATTENTION: Language assistance services are available, free of charge. Please call [phone].      ATENCIÓN: Si habla español, tiene a dimas disposición servicios gratuitos de asistencia lingüística. Llame al [phone].     CHÚ Ý: N?u b?n nói Ti?ng Vi?t, có các d?ch v? h? tr? ngôn ng? mi?n phí dành cho b?n. G?i s? [phone].        Heart Failure Education       Heart Failure: Being Active  You have a condition called heart failure. Being active doesnt mean that you have to wear yourself out. Even a little movement each day helps to strengthen your heart. If you cant get out to exercise, you can do simple stretching and strengthening exercises at home. These are good ways to keep you well-conditioned and prevent you and your heart from becoming excessively weak.    Ideas to get you started  · Add a little movement to things you do now. Walk to mail letters. Park your car at the far end of the parking lot and walk to the store. Walk up a flight of stairs instead of taking the elevator.  · Choose activities you enjoy. You might walk, swim, or ride an exercise bike. Things like gardening and washing the car count, too. Other possibilities include: washing dishes, walking the dog, walking around the mall, and doing aerobic activities with friends.  · Join a group exercise program at a [facility], a senior center, or a community center. Or look into a hospital cardiac rehabilitation program. Ask your doctor if you qualify.  Tips to keep you going  · Get up and get dressed  each day. Go to a coffee shop and read a newspaper or go somewhere that you'll be in the presence of other active people. Youll feel more like being active.  · Make a plan. Choose one or more activities that you enjoy and that you can easily do. Then plan to do at least one each day. You might write your plan on a calendar.  · Go with a friend or a group if you like company. This can help you feel supported and stay motivated, too.  · Plan social events that you enjoy. This will keep you mentally engaged as well as physically motivated to do things you find pleasure in.  For your safety  · Talk with your healthcare provider before starting an exercise program.  · Exercise indoors when its too hot or too cold outside, or when the air quality is poor. Try walking at a shopping mall.  · Wear socks and sturdy shoes to maintain your balance and prevent falls.  · Start slowly. Do a few minutes several times a day at first. Increase your time and speed little by little.  · Stop and rest whenever you feel tired or get short of breath.  · Dont push yourself on days when you dont feel well.  Date Last Reviewed: 3/20/2016  © 9632-7787 [employer]. [address] [place] [address], [place] [address]. All rights reserved. This information is not intended as a substitute for professional medical care. Always follow your healthcare professional's instructions.              Heart Failure: Evaluating Your Heart  You have a condition called heart failure. To evaluate your condition, your doctor will examine you, ask questions, and do some tests. Along with looking for signs of heart failure, the doctor looks for any other health problems that may have led to heart failure. The results of your evaluation will help your doctor form a treatment plan.  Health history and physical exam  Your visit will start with a health history. Tell the doctor about any symptoms youve noticed and about all medicines you take. Then youll have  a physical exam. This includes listening to your heartbeat and breathing. Youll also be checked for swelling (edema) in your legs and neck. When you have fluid buildup or fluid in the lungs, it may be called congestive heart failure.  Diagnosing heart failure     During an echocardiogram, sound waves bounce off the heart. These are converted into a picture on the screen.   The following may be done to help your doctor form a diagnosis:  · X-rays show the size and shape of your heart. These pictures can also show fluid in your lungs.  · An electrocardiogram (ECG or EKG) shows the pattern of your heartbeat. Small pads (electrodes) are placed on your chest, arms, and legs. Wires connect the pads to the ECG machine, which records your hearts electrical signals. This can give the doctor information about heart function.  · An echocardiogram uses ultrasound waves to show the structure and movement of your heart muscle. This shows how well the heart pumps. It also shows the thickness of the heart walls, and if the heart is enlarged. It is one of the most useful, non-invasive tests as it provides information about the heart's general function. This helps your doctor make treatment decisions.  · Lab tests evaluate small amounts of blood or urine for signs of problems. A BNP lab test can help diagnose and evaluate heart failure. BNP stands for B-type natriuretic peptide. The ventricles secrete more BNP when heart failure worsens. Lab tests can also provide information about metabolic dysfunction or heart dysfunction.  Your treatment plan  Based on the results of your evaluation and tests, your doctor will develop a treatment plan. This plan is designed to relieve some of your heart failure symptoms and help make you more comfortable. Your treatment plan may include:  · Medicine to help your heart work better and improve your quality of life  · Changes in what you eat and drink to help prevent fluid from backing up in your  body  · Daily monitoring of your weight and heart failure symptoms to see how well your treatment plan is working  · Exercise to help you stay healthy  · Help with quitting smoking  · Emotional and psychological support to help adjust to the changes  · Referrals to other specialists to make sure you are being treated comprehensively  Date Last Reviewed: 3/21/2016  © 0337-9477 [employer]. [address] [place] [address], [place], [place] [address]. All rights reserved. This information is not intended as a substitute for professional medical care. Always follow your healthcare professional's instructions.              Heart Failure: Making Changes to Your Diet  You have a condition called heart failure. When you have heart failure, excess fluid is more likely to build up in your body because your heart isn't working well. This makes the heart work harder to pump blood. Fluid buildup causes symptoms such as shortness of breath and swelling (edema). This is often referred to as congestive heart failure or CHF. Controlling the amount of salt (sodium) you eat may help stop fluid from building up. Your doctor may also tell you to reduce the amount of fluid you drink.  Reading food labels    Your healthcare provider will tell you how much sodium you can eat each day. Read food labels to keep track. Keep in mind that certain foods are high in salt. These include canned, frozen, and processed foods. Check the amount of sodium in each serving. Watch out for high-sodium ingredients. These include MSG (monosodium glutamate), baking soda, and sodium phosphate.   Eating less salt  Give yourself time to get used to eating less salt. It may take a little while. Here are some tips to help:  · Take the saltshaker off the table. Replace it with salt-free herb mixes and spices.  · Eat fresh or plain frozen vegetables. These have much less salt than canned vegetables.  · Choose low-sodium snacks like sodium-free pretzels, crackers, or  air-popped popcorn.  · Dont add salt to your food when youre cooking. Instead, season your foods with pepper, lemon, garlic, or onion.  · When you eat out, ask that your food be cooked without added salt.  · Avoid eating fried foods as these often have a great deal of salt.  If youre told to limit fluids  You may need to limit how much fluid you have to help prevent swelling. This includes anything that is liquid at room temperature, such as ice cream and soup. If your doctor tells you to limit fluid, try these tips:  · Measure drinks in a measuring cup before you drink them. This will help you meet daily goals.  · Chill drinks to make them more refreshing.  · Suck on frozen lemon wedges to quench thirst.  · Only drink when youre thirsty.  · Chew sugarless gum or suck on hard candy to keep your mouth moist.  · Weigh yourself daily to know if your body's fluid content is rising.  My sodium goal  Your healthcare provider may give you a sodium goal to meet each day. This includes sodium found in food as well as salt that you add. My goal is to eat no more than ___________ mg of sodium per day.     When to call your doctor  Call your doctor right away if you have any symptoms of worsening heart failure. These can include:  · Sudden weight gain  · Increased swelling of your legs or ankles  · Trouble breathing when youre resting or at night  · Increase in the number of pillows you have to sleep on  · Chest pain, pressure, discomfort, or pain in the jaw, neck, or back   Date Last Reviewed: 3/21/2016  © 4300-5377 [employer]. [address] [place] [address]. All rights reserved. This information is not intended as a substitute for professional medical care. Always follow your healthcare professional's instructions.              Heart Failure: Medicines to Help Your Heart    You have a condition called heart failure (also known as congestive heart failure, or CHF). Your doctor will likely  prescribe medicines for heart failure and any underlying health problems you have. Most heart failure patients take one or more types of medicinen. Your healthcare provider will work to find the combination of medicines that works best for you.  Heart failure medicines  Here are the most common heart failure medicines:  · ACE inhibitors lower blood pressure and decrease strain on the heart. This makes it easier for the heart to pump. Angiotensin receptor blockers have similar effects. These are prescribed for some patients instead of ACE inhibitors.  · Beta-blockers relieve stress on the heart. They also improve symptoms. They may also improve the heart's pumping action over time.  · Diuretics (also called water pills) help rid your body of excess water. This can help rid your body of swelling (edema). Having less fluid to pump means your heart doesnt have to work as hard. Some diuretics make your body lose a mineral called potassium. Your doctor will tell you if you need to take supplements or eat more foods high in potassium.  · Digoxin helps your heart pump with more strength. This helps your heart pump more blood with each beat. So, more oxygen-rich blood travels to the rest of the body.  · Aldosterone antagonists help alter hormones and decrease strain on the heart.  · Hydralazine and nitrates are two separate medicines used together to treat heart failure. They may come in one combination pill. They lower blood pressure and decrease how hard the heart has to pump.  Medicines for related conditions  Controlling other heart problems helps keep heart failure under control, too. Depending on other heart problems you have, medicines may be prescribed to:  · Lower blood pressure (antihypertensives).  · Lower cholesterol levels (statins).  · Prevent blood clots (anticoagulants or aspirin).  · Keep the heartbeat steady (antiarrhythmics).  Date Last Reviewed: 3/5/2016  © 9453-5069 [employer] [address] [id number]. All rights reserved. This information is not intended as a substitute for professional medical care. Always follow your healthcare professional's instructions.              Heart Failure: Procedures That May Help    The heart is a muscle that pumps oxygen-rich blood to all parts of the body. When you have heart failure, the heart is not able to pump as well as it should. Blood and fluid may back up into the lungs (congestive heart failure), and some parts of the body dont get enough oxygen-rich blood to work normally. These problems lead to the symptoms of heart failure.     Certain procedures may help the heart pump better in some cases of heart failure. Some procedures are done to treat health problems that may have caused the heart failure such as coronary artery disease or heart rhythm problems. For more serious heart failure, other options are available.  Treating artery and valve problems  If you have coronary artery disease or valve disease, procedures may be done to improve blood flow. This helps the heart pump better, which can improve heart failure symptoms. First, your doctor may do a cardiac catheterization to help detect clogged blood vessels or valve damage. During this procedure, a  thin tube (catheter) in inserted into a blood vessel and guided to the heart. There a dye is injected and a special type of X-ray (angiogram) is taken of the blood vessels. Procedures to open a blocked artery or fix damaged valves can also be done using catheterization.  · Angioplasty uses a balloon-tipped instrument at the end of the catheter. The balloon is inflated to widen the narrowed artery. In many cases, a stent is expanded to further support the narrowed artery. A stent is a metal mesh tube.  · Valve surgery repairs or replacement of faulty valves can also be done during catheterization so blood can flow properly through the chambers of the heart.  Bypass surgery is another  option to help treat blocked arteries. It uses a healthy blood vessel from elsewhere in the body. The healthy blood vessel is attached above and below the blocked area so that blood can flow around the blocked artery.  Treating heart rhythm problems  A device may be placed in the chest to help a weak heart maintain a healthy, heartbeat so the heart can pump more effectively:  · Pacemaker. A pacemaker is an implanted device that regulates your heartbeat electronically. It monitors your heart's rhythm and generates a painless electric impulse that helps the heart beat in a regular rhythm. A pacemaker is programmed to meet your specific heart rhythm needs.  · Biventricular pacing/cardiac resynchronization therapy. A type of pacemaker that paces both pumping chambers of the heart at the same time to coordinate contractions and to improve the heart's function. Some people with heart failure are candidates for this therapy.  · Implantable cardioverter defibrillator. A device similar to a pacemaker that senses when the heart is beating too fast and delivers an electrical shock to convert the fast rhythm to a normal rhythm. This can be a life saving device.  In severe cases  In more serious cases of heart failure when other treatments no longer work, other options may include:  · Ventricular assist devices (VADs). These are mechanical devices used to take over the pumping function for one or both of the heart's ventricles, or pumping chambers. A VAD may be necessary when heart failure progresses to the point that medicines and other treatments no longer help. In some cases, a VAD may be used as a bridge to transplant.  · Heart transplant. This is replacing the diseased heart with a healthy one from a donor. This is an option for a few people who are very sick. A heart transplant is very serious and not an option for all patients. Your doctor can tell you more.  Date Last Reviewed: 3/20/2016  © 0897-5211 [employer]. [address] [place] [address], [place] [address]. All rights reserved. This information is not intended as a substitute for professional medical care. Always follow your healthcare professional's instructions.              Heart Failure: Tracking Your Weight  You have a condition called heart failure. When you have heart failure, a sudden weight gain or a steady rise in weight is a warning sign that your body is retaining too much water and salt. This could mean your heart failure is getting worse. If left untreated, it can cause problems for your lungs and result in shortness of breath. Weighing yourself each day is the best way to know if youre retaining water. If your weight goes up quickly, call your doctor. You will be given instructions on how to get rid of the excess water. You will likely need medicines and to avoid salt. This will help your heart work better.  Call your doctor if you gain more than 2 pounds in 1 day, more than 5 pounds in 1 week, or whatever weight gain you were told to report by your doctor. This is often a sign of worsening heart failure and needs to be evaluated and treated. Your doctor will tell you what to do next.   Tips for weighing yourself    · Weigh yourself at the same time each morning, wearing the same clothes. Weigh yourself after urinating and before eating.  · Use the same scale each day. Make sure the numbers are easy to read. Put the scale on a flat, hard surface -- not on a rug or carpet.  · Do not stop weighing yourself. If you forget one day, weigh again the next morning.  How to use your weight chart  · Keep your weight chart near the scale. Write your weight on the chart as soon as you get off the scale.  · Fill in the month and the start date on the chart. Then write down your weight each day. Your chart will look like this:    · If you miss a day, leave the space blank. Weigh yourself the next day and write your weight in the next space.  · Take your weight  chart with you when you go to see your doctor.  Date Last Reviewed: 3/20/2016  © 5940-4301 [employer]. [address] [place] [address], [place] [address]. All rights reserved. This information is not intended as a substitute for professional medical care. Always follow your healthcare professional's instructions.              Heart Failure: Warning Signs of a Flare-Up  You have a condition called heart failure. Once you have heart failure, flare-ups can happen. Below are signs that can mean your heart failure is getting worse. If you notice any of these warning signs, call your healthcare provider.  Swelling    · Your feet, ankles, or lower legs get puffier.  · You notice skin changes on your lower legs.  · Your shoes feel too tight.  · Your clothes are tighter in the waist.  · You have trouble getting rings on or off your fingers.  Shortness of breath  · You have to breathe harder even when youre doing your normal activities or when youre resting.  · You are short of breath walking up stairs or even short distances.  · You wake up at night short of breath or coughing.  · You need to use more pillows or sit up to sleep.  · You wake up tired or restless.  Other warning signs  · You feel weaker, dizzy, or more tired.  · You have chest pain or changes in your heartbeat.  · You have a cough that wont go away.  · You cant remember things or dont feel like eating.  Tracking your weight  Gaining weight is often the first warning sign that heart failure is getting worse. Gaining even a few pounds can be a sign that your body is retaining excess water and salt. Weighing yourself each day in the morning after you urinate and before you eat, is the best way to know if you're retaining water. Get a scale that is easy to read and make sure you wear the same clothes and use the same scale every time you weigh. Your healthcare provider will show you how to track your weight. Call your doctor if you gain more than 2  pounds in 1 day, 5 pounds in 1 week, or whatever weight gain you were told to report by your doctor. This is often a sign of worsening heart failure and needs to be evaluated and treated before it compromises your breathing. Your doctor will tell you what to do next.    Date Last Reviewed: 3/15/2016  © 3032-0428 [employer]. [address] [place] [address], [place] [address]. All rights reserved. This information is not intended as a substitute for professional medical care. Always follow your healthcare professional's instructions.              Pneumonmia Discharge Instructions                Coumadin Discharge Instructions                         Diabetes Discharge Instructions                                    [facility] complies with applicable Federal civil rights laws and does not discriminate on the basis of race, color, national origin, age, disability, or sex.

## 2017-02-14 NOTE — ED PROVIDER NOTES
"Encounter Date: 2017    SCRIBE #1 NOTE: ICurt, am scribing for, and in the presence of, Dr. Marie.       History     Chief Complaint   Patient presents with    Shortness of Breath     Review of patient's allergies indicates:   Allergen Reactions    Dilaudid [hydromorphone] Anaphylaxis     Other reaction(s): Anaphylaxis  Other reaction(s): Unknown     HPI Comments: 2017  2:40 PM     Chief Complaint: SOB      The patient is a 66 y.o. female with a PMHx of *atrial flutter; anxiety; asthma; atrial fibrillation; CHF; COPD; depression; DM; emphysema of lung; who is presenting with an acute onset of SOB that started 1.5-2 wks ago. Pt reported that her home health nurse sent her to the ED today because her breathing "sounds like she is going into CHF." Associated symptom of bilateral leg swelling. She reported that her symptoms are similar to previous episode when she was recently admitted to [facility]. Pt has a past surgical history that includes  section; Oophorectomy; Retinal detachment surgery; Cataract extraction- left; Eye surgery; and Hysterectomy.      The history is provided by the patient.     Past Medical History   Diagnosis Date    *Atrial flutter     Anxiety     Arthritis     Asthma     Atrial fibrillation     Back pain     Cataract      OD    CHF (congestive heart failure)     COPD (chronic obstructive pulmonary disease)     Depression     Diabetes mellitus     Emphysema of lung     Heart failure     Hernia     Hypercapnic respiratory failure, chronic 2016    Hyperlipidemia     Hypertension     Iron deficiency anemia 2/3/2016    Myocardial infarction     Obesity     Peripheral vascular disease     Pneumonia     Polyneuropathy     Retinal detachment      OS    Tobacco dependence     Type II 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, not stated as uncontrolled      Past Medical History Pertinent Negatives   Diagnosis Date " Noted    Abnormal Pap smear 8/8/2012    Acute leukemia 9/10/2015    Acute pancreatitis 9/10/2015    Alcohol dependence 9/10/2015    Alzheimer's disease 9/10/2015    Amblyopia 6/16/2016    Angina pectoris 9/10/2015    Aplasia bone marrow 9/10/2015    Bipolar disorder 9/10/2015    Bladder cancer 9/10/2015    Bone cancer 9/10/2015    Bone marrow transplant status 9/10/2015    Brain cancer 9/10/2015    Breast cancer 9/10/2015    Cancer of lymphatic and hematopoietic tissue 9/10/2015    Cerebral palsy 9/10/2015    Cervical cancer 9/10/2015    Chronic bronchitis 9/10/2015    Chronic hepatitis 1/19/2016    Chronic hepatitis, unspecified 9/10/2015    Cirrhosis 9/10/2015    Colon cancer 9/10/2015    Complications of reattached extremity 1/19/2016    Complications of unspecified reattached extremity 9/10/2015    Coronary artery disease 9/10/2015    Cystic fibrosis 9/10/2015    Dependence on renal dialysis 1/19/2016    Difficult intubation 11/2/2016    Endometrial cancer 9/10/2015    Esophageal cancer 9/10/2015    Fallopian tube cancer, carcinoma 9/10/2015    Fibromyalgia 1/19/2016    Gastrostomy status 9/10/2015    General anesthetics causing adverse effect in therapeutic use 11/2/2016    Glaucoma 9/10/2015    Glomerulonephritis 9/10/2015    Heart transplanted 9/10/2015    Hemolytic anemia 9/10/2015    Hepatitis B 9/10/2015    Hepatitis C 9/10/2015    HIV infection 9/10/2015    Hypothyroidism 9/10/2015    Immune deficiency disorder 9/10/2015    Immune disorder 9/10/2015    Inflammatory bowel disease 9/10/2015    Interstitial nephritis chronic 9/10/2015    Intracranial hemorrhage 9/10/2015    Late complications of amputation stump 1/19/2016    Late complications of amputation stump, unspecified 9/10/2015    Late effect of traumatic amputation 9/10/2015    Liver cancer 9/10/2015    Liver transplanted 9/10/2015    Lung cancer 9/10/2015    Lung transplanted 9/10/2015     Macular degeneration 6/16/2016    Malignant hyperthermia 11/2/2016    Malnutrition 9/10/2015    Melanoma 9/10/2015    Multiple sclerosis 9/10/2015    Myalgia and myositis, unspecified 9/10/2015    Neoplasm of lip 1/19/2016    Osteoporosis 9/10/2015    Other early complications of trauma 9/10/2015    Ovarian cancer 9/10/2015    Pancreatic cancer 9/10/2015    Paranoia 9/10/2015    Parkinson disease 9/10/2015    Phantom limb (syndrome) 9/10/2015    Phantom limb syndrome 1/19/2016    Pneumonia due to other specified bacteria(482.89) 9/10/2015    PONV (postoperative nausea and vomiting) 11/2/2016    Pressure ulcer, unspecified site(707.00) 9/10/2015    Pulmonary embolism 9/10/2015    Rectal cancer 9/10/2015    Renal cancer 9/10/2015    Renal dialysis status(V45.11) 9/10/2015    Respiratory distress 11/2/2016    Rheumatoid arthritis(714.0) 9/10/2015    Schizoaffective disorder 9/10/2015    Seizures 9/10/2015    Septic shock 9/10/2015    Sickle cell anemia 9/10/2015    Sickle cell trait 6/16/2016    Skin ulcer 1/19/2016    Sleep apnea 9/10/2015    Small intestine cancer 9/10/2015    Spinal cord disease 9/10/2015    Spinal cord injury 9/10/2015    Squamous cell carcinoma 9/10/2015    Stomach cancer 9/10/2015    Strabismus 6/16/2016    Stroke 9/10/2015    Suicide and self-inflicted injury by other specified means 9/10/2015    Testicular cancer 9/10/2015    Thyroid cancer 9/10/2015    Transfusion reaction 11/2/2016    Trouble in sleeping 9/10/2015    Type 2 diabetes mellitus 1/19/2016    Type II or unspecified type diabetes mellitus with ophthalmic manifestations, not stated as uncontrolled 9/10/2015    Type II or unspecified type diabetes mellitus with peripheral circulatory disorders, not stated as uncontrolled(250.70) 9/10/2015    Type II or unspecified type diabetes mellitus with renal manifestations, not stated as uncontrolled 9/10/2015    Type II or unspecified type  diabetes mellitus without mention of complication, not stated as uncontrolled 9/10/2015    Unspecified disease of pancreas 9/10/2015    Urinary incontinence 9/10/2015    Uveitis 2016    Vaginal cancer 9/10/2015    Vertebral fracture 9/10/2015    Vulvar cancer 9/10/2015     Past Surgical History   Procedure Laterality Date     section       x2    Oophorectomy       one ovary conserved    Retinal detachment surgery       buckle --OS    Cataract extraction Left      OS     Eye surgery      Hysterectomy       Family History   Problem Relation Age of Onset    Arthritis Father     Heart disease Father     Early death Sister 30     heart disease    Heart disease Sister 32     MI    No Known Problems Brother     No Known Problems Daughter     Blindness Son      due to accident//    Arthritis Sister     Heart disease Sister     Crohn's disease Sister     Cancer Brother      asbestosis    Breast cancer Neg Hx     Glaucoma Neg Hx     Macular degeneration Neg Hx     Retinal detachment Neg Hx     Amblyopia Neg Hx     Diabetes Neg Hx     Cataracts Neg Hx     Hypertension Neg Hx     Strabismus Neg Hx     Stroke Neg Hx     Thyroid disease Neg Hx      Social History   Substance Use Topics    Smoking status: Current Some Day Smoker     Packs/day: 0.50     Years: 50.00     Types: Cigarettes     Start date: 1968     Last attempt to quit: 2015    Smokeless tobacco: Never Used    Alcohol use No     Review of Systems   Constitutional: Negative for fever.   Respiratory: Positive for shortness of breath.    Cardiovascular: Positive for leg swelling (Bilateral leg swelling.). Negative for chest pain.   Gastrointestinal: Negative for nausea.   Genitourinary: Negative for dysuria.   Musculoskeletal: Negative for back pain.   Skin: Negative for rash.   Neurological: Negative for weakness.   Hematological: Does not bruise/bleed easily.   Psychiatric/Behavioral: Negative for confusion.    All other systems reviewed and are negative.      Physical Exam   Initial Vitals   BP Pulse Resp Temp SpO2   02/14/17 1321 02/14/17 1321 02/14/17 1321 02/14/17 1321 02/14/17 1321   147/74 77 36 97.6 °F (36.4 °C) 90 %     Physical Exam    Nursing note and vitals reviewed.  Constitutional: She appears well-developed and well-nourished. No distress.   HENT:   Head: Normocephalic and atraumatic.   Mouth/Throat: Oropharynx is clear and moist.   Eyes: Conjunctivae and EOM are normal. Pupils are equal, round, and reactive to light.   Neck: Neck supple.   Cardiovascular: Normal rate, regular rhythm, normal heart sounds and intact distal pulses. Exam reveals no gallop and no friction rub.    No murmur heard.  Pulmonary/Chest: She has rales (Rales bilaterally.).   Abdominal: Soft. She exhibits no distension. There is no tenderness.   Musculoskeletal: Normal range of motion.   Pitting edema in lower extremities.   Neurological: She is alert and oriented to person, place, and time.   Skin: No rash noted. No erythema.   Psychiatric: She has a normal mood and affect.         ED Course   Procedures  Labs Reviewed - No data to display  EKG Readings: (Independently Interpreted)   Previous EKG: Compared with most recent EKG Previous EKG Date: No change from November 2, 2016. Rhythm: Normal Sinus Rhythm. Heart Rate: 70. Ectopy: No Ectopy. Conduction: Normal. ST Segments: Normal ST Segments. T Waves: Normal. Clinical Impression: Normal Sinus Rhythm          Medical Decision Making:   History:   Old Medical Records: I decided to obtain old medical records.  Clinical Tests:   Lab Tests: Ordered and Reviewed  Radiological Study: Ordered and Reviewed  Medical Tests: Ordered and Reviewed            Scribe Attestation:   Scribe #1: I performed the above scribed service and the documentation accurately describes the services I performed. I attest to the accuracy of the note.    Attending Attestation:           Physician Attestation for  Scribe:  Physician Attestation Statement for Scribe #1: I, Dr. Marie, reviewed documentation, as scribed by Curt Jiménez in my presence, and it is both accurate and complete.                 ED Course   Comment By Time   Seen in ED by hospital medicine javier Marie MD 02/14 4475     Clinical Impression:   The encounter diagnosis was Acute congestive heart failure, unspecified congestive heart failure type.      66-year-old female with a history of CHF, COPD presents to the ER for shortness of breath.  Significant lower extremity edema.  BNP is elevated, chest x-ray suggestive of CHF.  No distress in the emergency department.  I do not suspect pulmonary embolus.  Patient be admitted to hospital medicine for further care.     Suresh Marie MD  02/14/17 2190

## 2017-02-15 ENCOUNTER — OUTPATIENT CASE MANAGEMENT (OUTPATIENT)
Dept: ADMINISTRATIVE | Facility: OTHER | Age: 67
End: 2017-02-15

## 2017-02-15 ENCOUNTER — TELEPHONE (OUTPATIENT)
Dept: FAMILY MEDICINE | Facility: CLINIC | Age: 67
End: 2017-02-15

## 2017-02-15 LAB
ANION GAP SERPL CALC-SCNC: 11 MMOL/L
BASOPHILS # BLD AUTO: 0.1 K/UL
BASOPHILS NFR BLD: 1.3 %
BUN SERPL-MCNC: 9 MG/DL
CALCIUM SERPL-MCNC: 8.6 MG/DL
CHLORIDE SERPL-SCNC: 100 MMOL/L
CO2 SERPL-SCNC: 29 MMOL/L
CREAT SERPL-MCNC: 0.6 MG/DL
DIFFERENTIAL METHOD: ABNORMAL
EOSINOPHIL # BLD AUTO: 0.3 K/UL
EOSINOPHIL NFR BLD: 3.6 %
ERYTHROCYTE [DISTWIDTH] IN BLOOD BY AUTOMATED COUNT: 16.4 %
EST. GFR  (AFRICAN AMERICAN): >60 ML/MIN/1.73 M^2
EST. GFR  (NON AFRICAN AMERICAN): >60 ML/MIN/1.73 M^2
ESTIMATED AVG GLUCOSE: 128 MG/DL
ESTIMATED PA SYSTOLIC PRESSURE: 39
GLUCOSE SERPL-MCNC: 97 MG/DL
HBA1C MFR BLD HPLC: 6.1 %
HCT VFR BLD AUTO: 34.6 %
HGB BLD-MCNC: 10.8 G/DL
INR PPP: 1.7
LYMPHOCYTES # BLD AUTO: 2 K/UL
LYMPHOCYTES NFR BLD: 23.1 %
MCH RBC QN AUTO: 25.5 PG
MCHC RBC AUTO-ENTMCNC: 31 %
MCV RBC AUTO: 82 FL
MITRAL VALVE REGURGITATION: NORMAL
MONOCYTES # BLD AUTO: 1 K/UL
MONOCYTES NFR BLD: 11.8 %
NEUTROPHILS # BLD AUTO: 5.3 K/UL
NEUTROPHILS NFR BLD: 60.2 %
PLATELET # BLD AUTO: 173 K/UL
PMV BLD AUTO: 9.2 FL
POCT GLUCOSE: 104 MG/DL (ref 70–110)
POCT GLUCOSE: 131 MG/DL (ref 70–110)
POCT GLUCOSE: 152 MG/DL (ref 70–110)
POTASSIUM SERPL-SCNC: 3.7 MMOL/L
PROTHROMBIN TIME: 17.7 SEC
RBC # BLD AUTO: 4.21 M/UL
RETIRED EF AND QEF - SEE NOTES: 51 (ref 55–65)
SODIUM SERPL-SCNC: 140 MMOL/L
WBC # BLD AUTO: 8.8 K/UL

## 2017-02-15 PROCEDURE — 85610 PROTHROMBIN TIME: CPT

## 2017-02-15 PROCEDURE — C8929 TTE W OR WO FOL WCON,DOPPLER: HCPCS

## 2017-02-15 PROCEDURE — G8979 MOBILITY GOAL STATUS: HCPCS | Mod: CJ

## 2017-02-15 PROCEDURE — G8978 MOBILITY CURRENT STATUS: HCPCS | Mod: CK

## 2017-02-15 PROCEDURE — 25000242 PHARM REV CODE 250 ALT 637 W/ HCPCS: Performed by: HOSPITALIST

## 2017-02-15 PROCEDURE — 94640 AIRWAY INHALATION TREATMENT: CPT

## 2017-02-15 PROCEDURE — 25000003 PHARM REV CODE 250: Performed by: NURSE PRACTITIONER

## 2017-02-15 PROCEDURE — 63600175 PHARM REV CODE 636 W HCPCS: Performed by: NURSE PRACTITIONER

## 2017-02-15 PROCEDURE — 12000002 HC ACUTE/MED SURGE SEMI-PRIVATE ROOM

## 2017-02-15 PROCEDURE — 85025 COMPLETE CBC W/AUTO DIFF WBC: CPT

## 2017-02-15 PROCEDURE — 63600175 PHARM REV CODE 636 W HCPCS: Performed by: INTERNAL MEDICINE

## 2017-02-15 PROCEDURE — 80048 BASIC METABOLIC PNL TOTAL CA: CPT

## 2017-02-15 PROCEDURE — 36415 COLL VENOUS BLD VENIPUNCTURE: CPT

## 2017-02-15 PROCEDURE — 97161 PT EVAL LOW COMPLEX 20 MIN: CPT

## 2017-02-15 PROCEDURE — 27000221 HC OXYGEN, UP TO 24 HOURS

## 2017-02-15 PROCEDURE — 25000003 PHARM REV CODE 250: Performed by: PHYSICIAN ASSISTANT

## 2017-02-15 PROCEDURE — 25000003 PHARM REV CODE 250: Performed by: HOSPITALIST

## 2017-02-15 PROCEDURE — 94761 N-INVAS EAR/PLS OXIMETRY MLT: CPT

## 2017-02-15 RX ORDER — MIRTAZAPINE 15 MG/1
15 TABLET, FILM COATED ORAL NIGHTLY
Status: DISCONTINUED | OUTPATIENT
Start: 2017-02-15 | End: 2017-02-19 | Stop reason: HOSPADM

## 2017-02-15 RX ORDER — ADHESIVE BANDAGE
30 BANDAGE TOPICAL DAILY PRN
Status: DISCONTINUED | OUTPATIENT
Start: 2017-02-15 | End: 2017-02-19 | Stop reason: HOSPADM

## 2017-02-15 RX ADMIN — HUMAN ALBUMIN MICROSPHERES AND PERFLUTREN 0.11 MG: 10; .22 INJECTION, SOLUTION INTRAVENOUS at 12:02

## 2017-02-15 RX ADMIN — IPRATROPIUM BROMIDE AND ALBUTEROL SULFATE 3 ML: .5; 3 SOLUTION RESPIRATORY (INHALATION) at 11:02

## 2017-02-15 RX ADMIN — CLONAZEPAM 1 MG: 1 TABLET ORAL at 10:02

## 2017-02-15 RX ADMIN — IPRATROPIUM BROMIDE AND ALBUTEROL SULFATE 3 ML: .5; 3 SOLUTION RESPIRATORY (INHALATION) at 12:02

## 2017-02-15 RX ADMIN — FUROSEMIDE 40 MG: 10 INJECTION, SOLUTION INTRAMUSCULAR; INTRAVENOUS at 08:02

## 2017-02-15 RX ADMIN — LISINOPRIL 20 MG: 10 TABLET ORAL at 08:02

## 2017-02-15 RX ADMIN — WARFARIN SODIUM 5 MG: 5 TABLET ORAL at 04:02

## 2017-02-15 RX ADMIN — GABAPENTIN 600 MG: 300 CAPSULE ORAL at 08:02

## 2017-02-15 RX ADMIN — FUROSEMIDE 40 MG: 10 INJECTION, SOLUTION INTRAMUSCULAR; INTRAVENOUS at 04:02

## 2017-02-15 RX ADMIN — MIRTAZAPINE 15 MG: 15 TABLET, FILM COATED ORAL at 08:02

## 2017-02-15 RX ADMIN — ATORVASTATIN CALCIUM 20 MG: 20 TABLET, FILM COATED ORAL at 08:02

## 2017-02-15 RX ADMIN — METOPROLOL SUCCINATE 12.5 MG: 25 TABLET, EXTENDED RELEASE ORAL at 08:02

## 2017-02-15 RX ADMIN — HYDROCODONE BITARTRATE AND ACETAMINOPHEN 1 TABLET: 10; 325 TABLET ORAL at 04:02

## 2017-02-15 RX ADMIN — IPRATROPIUM BROMIDE AND ALBUTEROL SULFATE 3 ML: .5; 3 SOLUTION RESPIRATORY (INHALATION) at 08:02

## 2017-02-15 RX ADMIN — IPRATROPIUM BROMIDE AND ALBUTEROL SULFATE 3 ML: .5; 3 SOLUTION RESPIRATORY (INHALATION) at 04:02

## 2017-02-15 NOTE — PT/OT/SLP EVAL
Physical Therapy  Evaluation    Nelly Tian   MRN: [id number]   Admitting Diagnosis: <principal problem not specified>    PT Received On: 02/15/17  PT Start Time: 1425     PT Stop Time: 1450    PT Total Time (min): 25 min       Billable Minutes:  Evaluation 25    Diagnosis: <principal problem not specified>  Weakness, decreased functional mobility    Past Medical History   Diagnosis Date    *Atrial flutter     Anxiety     Arthritis     Asthma     Atrial fibrillation     Back pain     Cataract      OD    CHF (congestive heart failure)     COPD (chronic obstructive pulmonary disease)     Depression     Diabetes mellitus     Emphysema of lung     Heart failure     Hernia     Hypercapnic respiratory failure, chronic 2016    Hyperlipidemia     Hypertension     Iron deficiency anemia 2/3/2016    Myocardial infarction     Obesity     Peripheral vascular disease     Pneumonia     Polyneuropathy     Retinal detachment      OS    Tobacco dependence     Type II 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, not stated as uncontrolled       Past Surgical History   Procedure Laterality Date     section       x2    Oophorectomy       one ovary conserved    Retinal detachment surgery       buckle --OS    Cataract extraction Left      OS     Eye surgery      Hysterectomy         Referring physician: Fee  Date referred to PT: 2/15/17      General Precautions: Standard, diabetic, fall  Orthopedic Precautions: N/A   Braces:         Do you have any cultural, spiritual, Mormonism conflicts, given your current situation?: none    Patient History:  Lives With: child(bonny), adult, grandchild(bonny)  Living Arrangements: mobile home  Home Accessibility:  (ramp)  Home Layout: Able to live on 1st floor  Transportation Available: family or friend will provide  Equipment Currently Used at Home: rollator, cane, straight, wheelchair, power chair, oxygen  DME owned (not  currently used): rolling walker, single point cane and wheelchair    Previous Level of Function:  Ambulation Skills: needs device  Transfer Skills: needs device  ADL Skills: needs device  Work/Leisure Activity: needs device    Subjective:  Communicated with pt's nurse Mary prior to session.    Chief Complaint: shortness of breath, back pain from being on hard bed in ED yesterday  Patient goals: get home soon    Pain Ratin/10               Pain Rating Post-Intervention: 2/10    Objective:   Patient found with: oxygen, telemetry, peripheral IV     Cognitive Exam:  Oriented to: Person, Place and Situation    Follows Commands/attention: Follows multistep  commands  Communication: clear/fluent  Safety awareness/insight to disability: intact    Physical Exam:  Postural examination/scapula alignment: Rounded shoulder and Head forward    Skin integrity: Visible skin intact  Edema: Mild B ankles    Sensation:   Intact    Upper Extremity Range of Motion:  Right Upper Extremity: WFL  Left Upper Extremity: WFL    Upper Extremity Strength:  Right Upper Extremity: WFL  Left Upper Extremity: WFL    Lower Extremity Range of Motion:  Right Lower Extremity: WFL  Left Lower Extremity: WFL    Lower Extremity Strength:  Right Lower Extremity: 4/5  Left Lower Extremity: 4/5     Fine motor coordination:    Gross motor coordination: WFL    Functional Mobility:  Bed Mobility:  Rolling/Turning to Left: Modified independent  Rolling/Turning Right: Modified independent  Scooting/Bridging: Modified Independent  Supine to Sit: Modified Independent  Sit to Supine: Modified Independent    Transfers:  Sit <> Stand Assistance: Modified Independent  Sit <> Stand Assistive Device: Straight Cane  Bed <> Chair Technique: Stand Pivot  Bed <> Chair Assistance: Modified Independent  Bed <> Chair Assistive Device: Grab bars    Gait:   Gait Distance: 8  Assistance 1: Supervision  Gait Assistive Device: Rolling walker  Gait Pattern: 3-point  gait      Therapeutic Activities and Exercises:  Pt instructed in AP's, HS, QS, GS.     AM-PAC 6 CLICK MOBILITY  How much help from another person does this patient currently need?   1 = Unable, Total/Dependent Assistance  2 = A lot, Maximum/Moderate Assistance  3 = A little, Minimum/Contact Guard/Supervision  4 = None, Modified [place]/Independent    Turning over in bed (including adjusting bedclothes, sheets and blankets)?: 3  Sitting down on and standing up from a chair with arms (e.g., wheelchair, bedside commode, etc.): 3  Moving from lying on back to sitting on the side of the bed?: 3  Moving to and from a bed to a chair (including a wheelchair)?: 3  Need to walk in hospital room?: 3  Climbing 3-5 steps with a railing?: 3  Total Score: 18     AM-PAC Raw Score CMS G-Code Modifier Level of Impairment Assistance   6 % Total / Unable   7 - 9 CM 80 - 100% Maximal Assist   10 - 14 CL 60 - 80% Moderate Assist   15 - 19 CK 40 - 60% Moderate Assist   20 - 22 CJ 20 - 40% Minimal Assist   23 CI 1-20% SBA / CGA   24 CH 0% Independent/ Mod I     Patient left HOB elevated with all lines intact, call button in reach and nurse notified.    Assessment:   Nelly Tian is a 66 y.o. female with a medical diagnosis of <principal problem not specified> and presents with weakness, decreased functional mobility.    Rehab identified problem list/impairments: Rehab identified problem list/impairments: weakness, impaired endurance, impaired balance, pain    Rehab potential is good.    Activity tolerance: Fair    Discharge recommendations: Discharge Facility/Level Of Care Needs: home health PT     Barriers to discharge: Barriers to Discharge: None    GOALS:   Physical Therapy Goals        Problem: Physical Therapy Goal    Goal Priority Disciplines Outcome Goal Variances Interventions   Physical Therapy Goal     PT/OT, PT      Description:  Goals to be met by: 2/22/17     Patient will increase functional independence  with mobility by performin. Gait  x 200 feet with Stand-by Assistance using Rolling Walker.   2. Lower extremity exercise program x20 reps with assistance as needed                PLAN:    Patient to be seen 6 x/week to address the above listed problems via gait training, therapeutic activities, therapeutic exercises, neuromuscular re-education  Plan of Care expires: 17  Plan of Care reviewed with: patient          Skylar Teague, PT  02/15/2017

## 2017-02-15 NOTE — PROGRESS NOTES
[facility] Hospital Medicine  Progress Note    Patient Name: Nelly Tian  MRN: [id number]  Patient Class: IP- Inpatient   Admission Date: 2/14/2017  Length of Stay: 1 days  Attending Physician: Dion Aguirre MD  Primary Care Provider: Constantine Bird MD        Subjective:     Principal Problem:<principal problem not specified>    HPI:  Nelly Tian is 66 year old female with PMH of atrial fibrillation, COPD, diastolic heart failure, hypertension, morbid obesity, chronic respiratory failure on home oxygen, and PVD who presents to the ED for evaluation of worsening of chronic dyspnea for ~1-2 weeks associated with increased peripheral edema.  Patient states she took an extra dose of Lasix 2 days without any improvement of shortness of breath. She was evaluated by Home amy nurse this am and recommended to go the ED for evaluation. Denies chest pain.     Hospital Course:  Initiated on IV lasix and Respiratory treatments. Echo completed. Cardiology consulted.      Interval History: Patient with mild improvement of dyspnea. Good diuresis with IV lasix.  Denies chest pain.     Review of Systems   Constitutional: Positive for fatigue. Negative for diaphoresis and fever.   Respiratory: Positive for cough, shortness of breath and wheezing.    Cardiovascular: Positive for leg swelling. Negative for chest pain.   Gastrointestinal: Negative for abdominal distention and abdominal pain.   Musculoskeletal: Positive for arthralgias and back pain.   Neurological: Negative for speech difficulty. Numbness: peripheral neuropathy      Objective:     Vital Signs (Most Recent):  Temp: 97.8 °F (36.6 °C) (02/15/17 1100)  Pulse: (!) 58 (02/15/17 1145)  Resp: 16 (02/15/17 1145)  BP: 129/66 (02/15/17 1100)  SpO2: 98 % (02/15/17 1145) Vital Signs (24h Range):  Temp:  [97.6 °F (36.4 °C)-98 °F (36.7 °C)] 97.8 °F (36.6 °C)  Pulse:  [58-91] 58  Resp:  [16-36] 16  SpO2:  [90 %-100 %] 98 %  BP: (129-160)/(60-91) 129/66      Weight: (!) 161.6 kg (356 lb 4.8 oz)  Body mass index is 69.59 kg/(m^2).    Intake/Output Summary (Last 24 hours) at 02/15/17 1313  Last data filed at 02/15/17 0427   Gross per 24 hour   Intake             1000 ml   Output              350 ml   Net              650 ml      Physical Exam   Constitutional: She is oriented to person, place, and time. She appears well-developed and well-nourished.   HENT:   Head: Normocephalic and atraumatic.   Mouth/Throat: Oropharynx is clear and moist.   Eyes: Conjunctivae and EOM are normal. Pupils are equal, round, and reactive to light.   Neck: Normal range of motion. Neck supple. No thyromegaly present.   Cardiovascular: Normal rate,  Irregular irregular and intact distal pulses.    + 1 peripheral edema with chronic skin changes    Pulmonary/Chest: Effort normal. She has wheezes. She has rales (bibasalar crackles).   Abdominal: Soft. Bowel sounds are normal. She exhibits no distension. There is no tenderness.   Musculoskeletal: Normal range of motion.   Neurological: She is alert and oriented to person, place, and time.   Decreased sensation to bilateral feet   Skin: Skin is warm and dry.   Psychiatric: She has a normal mood and affect. Her behavior is normal. Judgment normal.   Nursing note and vitals reviewed.      Significant Labs:   BMP:   Recent Labs  Lab 02/15/17  0509   GLU 97      K 3.7      CO2 29   BUN 9   CREATININE 0.6   CALCIUM 8.6*     CBC:   Recent Labs  Lab 02/14/17  1522 02/15/17  0509   WBC 8.90 8.80   HGB 11.2* 10.8*   HCT 37.2 34.6*    173       Significant Imaging: I have reviewed and interpreted all pertinent imaging results/findings within the past 24 hours.    Assessment/Plan:      Diabetes mellitus with neuropathy  Chronic. Resume gabapentin    Chronic obstructive pulmonary disease with acute exacerbation  Duo nebs scheduled. Home bronchodilators.       Tobacco dependency  Educated on smoking cessation      Chronic atrial  fibrillation  Resume beta blocker and warfarin      Hypertension associated with diabetes  Chronic. Resume home antihypertensives. accuchecks Ac and HS. Insulin correction scale      Morbid obesity with BMI of 45.0-49.9, adult  Low fat diet. Consult dietary for education    PVD (peripheral vascular disease)  Chronic. Noted on Arterial US 11/2016      Abdominal aortic atherosclerosis  Chronic. Noted on CXR. Check lipids. Resume statin      Acute on chronic respiratory failure with hypoxemia  With hypoxemia. Resume home oxygen 2 Liters. Monitor oxygen saturation      Acute on chronic diastolic congestive heart failure  Initiated on Lasix 40 mg IV BID. Trend I&  O.  Trend renal function. Echo pending.   Continue IV lasix, patient diuresing well      Moderate episode of recurrent major depressive disorder  Chronic. Resume lexapro and trazodone      VTE Risk Mitigation     None        Discussed POC with patient. Add PT    Ca Dupont NP  Department of Hospital Medicine   [facility]

## 2017-02-15 NOTE — PROGRESS NOTES
02/15/17 0803   Patient Assessment/Suction   Level of Consciousness (AVPU) alert   All Lung Fields Breath Sounds coarse   PRE-TX-O2-ETCO2   O2 Device (Oxygen Therapy) nasal cannula   $ Is the patient on Oxygen? Yes   Flow (L/min) 2   SpO2 96 %   Pulse Oximetry Type Intermittent   $ Pulse Oximetry - Multiple Charge Pulse Oximetry - Multiple   Pulse 70   Resp 16   Aerosol Therapy   $ Aerosol Therapy Charges Aerosol Treatment   Respiratory Treatment Status given   SVN/Inhaler Treatment Route mask   Position During Treatment other (see comments)  (sitting on side of bed)   Patient Tolerance good   Post-Treatment   Post-treatment Heart Rate (beats/min) 74   Post-treatment Resp Rate (breaths/min) 18   All Fields Breath Sounds aeration increased   duoneb q4 mask tx tolerated well, NC2L, vitals as charted.

## 2017-02-15 NOTE — TELEPHONE ENCOUNTER
----- Message from Abi Wang sent at 2/14/2017  4:23 PM CST -----  Contact: [facility],Chiquis Sim wants to inform office that patient went to ER today for shortness of breath also had a fall any question please call Chiquis at [phone]

## 2017-02-15 NOTE — H&P
[facility] Medicine  History & Physical    Patient Name: Nelly Tian  MRN: [id number]  Admission Date: 2017  Attending Physician: Micki Davies MD   Primary Care Provider: Constantine Bird MD         Patient information was obtained from patient and ER records.     Subjective:     Principal Problem:<principal problem not specified>    Chief Complaint:   Chief Complaint   Patient presents with    Shortness of Breath        HPI: Nelly Tian is 66 year old female with PMH of atrial fibrillation, COPD, diastolic heart failure, hypertension, morbid obesity, chronic respiratory failure on home oxygen, and PVD who presents to the ED for evaluation of worsening of chronic dyspnea for ~1-2 weeks associated with increased peripheral edema.  Patient states she took an extra dose of Lasix 2 days without any improvement of shortness of breath. She was evaluated by Home amy nurse this am and recommended to go the ED for evaluation. Denies chest pain.     Past Medical History   Diagnosis Date    *Atrial flutter     Anxiety     Arthritis     Asthma     Atrial fibrillation     Back pain     Cataract      OD    CHF (congestive heart failure)     COPD (chronic obstructive pulmonary disease)     Depression     Diabetes mellitus     Emphysema of lung     Heart failure     Hernia     Hypercapnic respiratory failure, chronic 2016    Hyperlipidemia     Hypertension     Iron deficiency anemia 2/3/2016    Myocardial infarction     Obesity     Peripheral vascular disease     Pneumonia     Polyneuropathy     Retinal detachment      OS    Tobacco dependence     Type II 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, not stated as uncontrolled        Past Surgical History   Procedure Laterality Date     section       x2    Oophorectomy       one ovary conserved    Retinal detachment surgery       buckle --OS    Cataract extraction  Left      OS     Eye surgery      Hysterectomy         Review of patient's allergies indicates:   Allergen Reactions    Dilaudid [hydromorphone] Anaphylaxis     Other reaction(s): Anaphylaxis  Other reaction(s): Unknown       No current facility-administered medications on file prior to encounter.      Current Outpatient Prescriptions on File Prior to Encounter   Medication Sig    acetaminophen (TYLENOL) 325 MG tablet Take 2 tablets (650 mg total) by mouth every 6 (six) hours as needed.    albuterol (ACCUNEB) 0.63 mg/3 mL Nebu INHALE THE CONTENTS OF 1 VIAL  BY  NEBULIZER EVERY 6 HOURS AS NEEDED    albuterol (VENTOLIN HFA) 90 mcg/actuation inhaler INHALE TWO PUFFS BY MOUTH EVERY 6 HOURS AS NEEDED FOR WHEEZING    albuterol-ipratropium 2.5mg-0.5mg/3mL (DUO-NEB) 0.5 mg-3 mg(2.5 mg base)/3 mL nebulizer solution INHALE THE CONTENTS OF 1 VIAL VIA NEBULIZER EVERY 6 HOURS AS NEEDED FOR  WHEEZING (DO NOT USE WITH ALBUTEROL INHALER)    atorvastatin (LIPITOR) 20 MG tablet Take 1 tablet (20 mg total) by mouth once daily.    budesonide (PULMICORT) 0.5 mg/2 mL nebulizer solution INHALE THE CONTENTS OF 1 VIAL VIA NEBULIZER EVERY DAY    clonazePAM (KLONOPIN) 1 MG tablet Take 1 tablet (1 mg total) by mouth 2 (two) times daily as needed for Anxiety.    escitalopram oxalate (LEXAPRO) 10 MG tablet TAKE 1 TABLET ONE TIME DAILY    furosemide (LASIX) 40 MG tablet TAKE ONE TABLET ONCE DAILY FOR FLUID OVERLOAD    gabapentin (NEURONTIN) 600 MG tablet Take 1 tablet (600 mg total) by mouth 3 (three) times daily. (Patient taking differently: Take 600 mg by mouth 2 (two) times daily. )    hydrocodone-acetaminophen 10-325mg (NORCO)  mg Tab Take 1 tablet by mouth every 8 (eight) hours as needed. (Patient taking differently: Take 1 tablet by mouth every 8 (eight) hours as needed for Pain. )    levalbuterol (XOPENEX) 0.63 mg/3 mL nebulizer solution INHALE THE CONTENTS OF 1 VIAL BY NEBULIZATION 3 (THREE) TIMES DAILY.     lisinopril (PRINIVIL,ZESTRIL) 20 MG tablet TAKE 1 TABLET ONE TIME DAILY    meloxicam (MOBIC) 7.5 MG tablet TAKE 1 TABLET ONE TIME DAILY    metformin (GLUCOPHAGE) 500 MG tablet TAKE 1 TABLET TWICE DAILY WITH MEALS    methocarbamol (ROBAXIN) 750 MG Tab TAKE 1 TABLET FOUR TIMES DAILY    multivitamin (THERAGRAN) tablet Take 1 tablet by mouth once daily.    polyethylene glycol (GLYCOLAX) 17 gram/dose powder MIX 17 GRAMS IN LIQUID AND DRINK EVERY DAY AS NEEDED    silver sulfADIAZINE 1% (SILVADENE) 1 % cream Apply topically 2 (two) times daily.    SPIRIVA WITH HANDIHALER 18 mcg inhalation capsule INHALE THE CONTENTS OF 1 CAPSULE EVERY DAY    temazepam (RESTORIL) 15 mg Cap TAKE 1 CAPSULE ONE TIME DAILY    trazodone (DESYREL) 100 MG tablet TAKE 1 TABLET EVERY NIGHT AS NEEDED  FOR  INSOMNIA    warfarin (COUMADIN) 5 MG tablet TAKE ONE TABLET EVERY DAY OR AS DIRECTED BY COUMADIN CLINIC (Patient taking differently: TAKE ONE TABLET EVERY DAY OR AS DIRECTED BY COUMADIN CLINIC /       7.5 mg Sat and Wed /      5 mg Mon, Tues, Thurs, Fri, Sun)    blood sugar diagnostic (TRUE METRIX GLUCOSE TEST STRIP) Strp Use to test blood sugar three times a day   DX:E11.9    blood-glucose meter (TRUE METRIX AIR GLUCOSE METER) kit Use as instructed to test blood sugar   DX:E11.9    lancets (TRUEPLUS LANCETS) 33 gauge Misc 1 lancet by Misc.(Non-Drug; Combo Route) route 3 (three) times daily. To test blood sugar   DX: E11.9    metoprolol succinate (TOPROL-XL) 25 MG 24 hr tablet Take 0.5 tablets (12.5 mg total) by mouth once daily. (Patient taking differently: Take 12.5 mg by mouth once daily. On hold waiting for dr visit)    [DISCONTINUED] (Magic mouthwash) 1:1:1 Benadryl 12.5mg/5ml liq, aluminum & magnesium hydroxide-simehticone (Maalox), lidocaine viscous 2% Swish and spit 15 mLs every 4 (four) hours as needed.    [DISCONTINUED] B12-levomefolate calcium-B6 2-1.13-25 mg Tab Take 1 tablet by mouth once daily.    [DISCONTINUED]  CALCIUM CARBONATE/VITAMIN D3 (CALCIUM 500 WITH D ORAL) Twice a day    [DISCONTINUED] DOCOSAHEXANOIC ACID/EPA (FISH OIL ORAL) Twice a day    [DISCONTINUED] ferrous gluconate (FERGON) 324 MG tablet Take 1 tablet (324 mg total) by mouth daily with breakfast.    [DISCONTINUED] urea (CARMOL) 40 % Crea Apply topically once daily.     Family History     Problem Relation (Age of Onset)    Arthritis Father, Sister    Blindness Son    Cancer Brother    Crohn's disease Sister    Early death Sister (30)    Heart disease Father, Sister (32), Sister    No Known Problems Brother, Daughter        Social History Main Topics    Smoking status: Current Some Day Smoker     Packs/day: 0.50     Years: 50.00     Types: Cigarettes     Start date: 1/19/1968     Last attempt to quit: 9/19/2015    Smokeless tobacco: Never Used      Comment: 1 ppd for 20 years    Alcohol use No    Drug use: No    Sexual activity: Not Currently     Review of Systems   Constitutional: Positive for fatigue. Negative for diaphoresis and fever.   HENT: Negative for congestion.    Eyes: Negative for pain.   Respiratory: Positive for shortness of breath and wheezing. Negative for cough.    Cardiovascular: Positive for leg swelling. Negative for chest pain.   Gastrointestinal: Negative for abdominal distention and abdominal pain.   Endocrine: Negative for polydipsia and polyphagia.   Genitourinary: Negative for dysuria and hematuria.   Musculoskeletal: Positive for back pain and gait problem.   Skin: Negative for rash.   Neurological: Positive for numbness (neuropathy). Negative for speech difficulty.   Psychiatric/Behavioral: Negative for agitation. The patient is not nervous/anxious.      Objective:     Vital Signs (Most Recent):  Temp: 97.6 °F (36.4 °C) (02/14/17 1321)  Pulse: 91 (02/14/17 1952)  Resp: (!) 21 (02/14/17 1952)  BP: (!) 160/74 (02/14/17 1631)  SpO2: (!) 94 % (02/14/17 1952) Vital Signs (24h Range):  Temp:  [97.6 °F (36.4 °C)] 97.6 °F (36.4  °C)  Pulse:  [74-91] 91  Resp:  [21-36] 21  SpO2:  [90 %-100 %] 94 %  BP: (147-160)/(71-75) 160/74     Weight: (!) 154.2 kg (340 lb)  Body mass index is 51.7 kg/(m^2).    Physical Exam   Constitutional: She is oriented to person, place, and time. She appears well-developed and well-nourished.   HENT:   Head: Normocephalic and atraumatic.   Right Ear: External ear normal.   Left Ear: External ear normal.   Mouth/Throat: Oropharynx is clear and moist.   Eyes: Conjunctivae and EOM are normal. Pupils are equal, round, and reactive to light.   Neck: Normal range of motion. Neck supple.   Thick neck circumference   Cardiovascular: Normal rate, regular rhythm, normal heart sounds and intact distal pulses.    No murmur heard.  +2 pretib/ ankle edema with chronic skin changes   Pulmonary/Chest: Effort normal. No respiratory distress (bibasalar crackles). She has wheezes. She has rales.   Abdominal: Soft. Bowel sounds are normal. She exhibits no distension.   Grossly protuberant.   Musculoskeletal: Normal range of motion.   Neurological: She is alert and oriented to person, place, and time.   Decreased sensation to bilateral feet.   Skin: Skin is warm and dry.   Psychiatric: She has a normal mood and affect. Her behavior is normal. Judgment normal.   Nursing note and vitals reviewed.       Significant Labs:   BMP:   Recent Labs  Lab 02/14/17  1522   GLU 85      K 3.9   CL 98   CO2 30*   BUN 7*   CREATININE 0.6   CALCIUM 9.0     CBC:   Recent Labs  Lab 02/14/17  1522   WBC 8.90   HGB 11.2*   HCT 37.2          Significant Imaging: I have reviewed and interpreted all pertinent imaging results/findings within the past 24 hours. CXR Cardiomegaly with mild pulmonary edema/CHF pattern, moderate right and possible small left pleural effusions.      Assessment/Plan:     Diabetes mellitus with neuropathy  Chronic. Resume gabapentin    COPD (chronic obstructive pulmonary disease)  Duo nebs as needed.       Tobacco  dependency  Educated on smoking cessation      Chronic atrial fibrillation  Resume beta blocker and warfarin      Hypertension associated with diabetes  Chronic. Resume home antihypertensives. accuchecks Ac and HS. Insulin correction scale      Morbid obesity with BMI of 45.0-49.9, adult  Low fat diet. Consult dietary for education    PVD (peripheral vascular disease)  Chronic. Noted on Arterial US 11/2016      Abdominal aortic atherosclerosis  Chronic. Noted on CXR. Check lipids      Acute on chronic respiratory failure with hypoxemia  With hypoxemia. Resume home oxygen 2 Liters. Monitor oxygen saturation      Acute on chronic diastolic congestive heart failure  Initiated on Lasix 40 mg IV BID. Trend I&  O.  Trend renal function      Moderate episode of recurrent major depressive disorder  Chronic. Resume lexapro and trazodone      VTE Risk Mitigation     None        Discussed POC with patient and family.    Time spent seeing patient( greater than 1/2 spent in direct contact) :   65 min    Ca Dupont NP  Department of Hospital Medicine   [facility]

## 2017-02-15 NOTE — CONSULTS
"Nutrition education provided r/t coumadin and CHF.  Pt notes she has been on coumadin for "a while" and has had education before.  Reviewed coumadin info.  Provided written materials and contact information should questions arise.    "

## 2017-02-15 NOTE — PLAN OF CARE
Problem: Patient Care Overview  Goal: Plan of Care Review  Outcome: Ongoing (interventions implemented as appropriate)  POC and fall risk reviewed with pt, understanding verbalized. Pt ambulates frequently to bedside commode. Complains of pain in her lower back, prn medication given. Blood glucose monitored. Bed in lowest position, wheels locked, call light within reach. Will continue to monitor.

## 2017-02-15 NOTE — PLAN OF CARE
PCP is Dr Bird.  Patient was active with [facility] until recently patient states.  Patient would like [facility] reordered upon discharge; skilled nurse only, patient doesn't want therapy.  Patient states that the HH was checking her INR's and they just discharged her recently and she isn't due to have it checked again until Monday.  When patient is discharged [facility]  needs to be reordered with labs for INR.       02/15/17 1417   Discharge Assessment   Assessment Type Discharge Planning Assessment   Confirmed/corrected address and phone number on facesheet? Yes   Assessment information obtained from? Patient   Prior to hospitilization cognitive status: Alert/Oriented   Prior to hospitalization functional status: Independent;Assistive Equipment   Current cognitive status: Alert/Oriented   Current Functional Status: Independent;Assistive Equipment   Arrived From home or self-care   Lives With child(bonny), adult;grandchild(bonny)  (with her daughter Maye and 2 grandkids)   Able to Return to Prior Arrangements yes   Is patient able to care for self after discharge? Yes   How many people do you have in your home that can help with your care after discharge? 1   Patient's perception of discharge disposition home health   Readmission Within The Last 30 Days no previous admission in last 30 days   Patient currently being followed by outpatient case management? No   Patient currently receives home health services? No   Does the patient currently use HME? Yes   Patient currently receives private duty nursing? No   Patient currently receives any other outside agency services? No   Equipment Currently Used at Home cane, straight;rollator;glucometer;oxygen;other (see comments)  (nebulizer; patient uses 2lnc)   Do you have any problems affording any of your prescribed medications? No   Is the patient taking medications as prescribed? yes   Do you have any financial concerns preventing you from receiving the healthcare you need?  No   Does the patient have transportation to healthcare appointments? Yes   Transportation Available family or friend will provide   On Dialysis? No   Does the patient receive services at the [facility]madin Clinic? (patient states that HH checks her INR)   Discharge Plan A Home Health   Patient/Family In Agreement With Plan yes

## 2017-02-15 NOTE — SUBJECTIVE & OBJECTIVE
Past Medical History   Diagnosis Date    *Atrial flutter     Anxiety     Arthritis     Asthma     Atrial fibrillation     Back pain     Cataract      OD    CHF (congestive heart failure)     COPD (chronic obstructive pulmonary disease)     Depression     Diabetes mellitus     Emphysema of lung     Heart failure     Hernia     Hypercapnic respiratory failure, chronic 2016    Hyperlipidemia     Hypertension     Iron deficiency anemia 2/3/2016    Myocardial infarction     Obesity     Peripheral vascular disease     Pneumonia     Polyneuropathy     Retinal detachment      OS    Tobacco dependence     Type II 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, not stated as uncontrolled        Past Surgical History   Procedure Laterality Date     section       x2    Oophorectomy       one ovary conserved    Retinal detachment surgery       buckle --OS    Cataract extraction Left      OS     Eye surgery      Hysterectomy         Review of patient's allergies indicates:   Allergen Reactions    Dilaudid [hydromorphone] Anaphylaxis     Other reaction(s): Anaphylaxis  Other reaction(s): Unknown       No current facility-administered medications on file prior to encounter.      Current Outpatient Prescriptions on File Prior to Encounter   Medication Sig    acetaminophen (TYLENOL) 325 MG tablet Take 2 tablets (650 mg total) by mouth every 6 (six) hours as needed.    albuterol (ACCUNEB) 0.63 mg/3 mL Nebu INHALE THE CONTENTS OF 1 VIAL  BY  NEBULIZER EVERY 6 HOURS AS NEEDED    albuterol (VENTOLIN HFA) 90 mcg/actuation inhaler INHALE TWO PUFFS BY MOUTH EVERY 6 HOURS AS NEEDED FOR WHEEZING    albuterol-ipratropium 2.5mg-0.5mg/3mL (DUO-NEB) 0.5 mg-3 mg(2.5 mg base)/3 mL nebulizer solution INHALE THE CONTENTS OF 1 VIAL VIA NEBULIZER EVERY 6 HOURS AS NEEDED FOR  WHEEZING (DO NOT USE WITH ALBUTEROL INHALER)    atorvastatin (LIPITOR) 20 MG tablet Take 1 tablet (20 mg  total) by mouth once daily.    budesonide (PULMICORT) 0.5 mg/2 mL nebulizer solution INHALE THE CONTENTS OF 1 VIAL VIA NEBULIZER EVERY DAY    clonazePAM (KLONOPIN) 1 MG tablet Take 1 tablet (1 mg total) by mouth 2 (two) times daily as needed for Anxiety.    escitalopram oxalate (LEXAPRO) 10 MG tablet TAKE 1 TABLET ONE TIME DAILY    furosemide (LASIX) 40 MG tablet TAKE ONE TABLET ONCE DAILY FOR FLUID OVERLOAD    gabapentin (NEURONTIN) 600 MG tablet Take 1 tablet (600 mg total) by mouth 3 (three) times daily. (Patient taking differently: Take 600 mg by mouth 2 (two) times daily. )    hydrocodone-acetaminophen 10-325mg (NORCO)  mg Tab Take 1 tablet by mouth every 8 (eight) hours as needed. (Patient taking differently: Take 1 tablet by mouth every 8 (eight) hours as needed for Pain. )    levalbuterol (XOPENEX) 0.63 mg/3 mL nebulizer solution INHALE THE CONTENTS OF 1 VIAL BY NEBULIZATION 3 (THREE) TIMES DAILY.    lisinopril (PRINIVIL,ZESTRIL) 20 MG tablet TAKE 1 TABLET ONE TIME DAILY    meloxicam (MOBIC) 7.5 MG tablet TAKE 1 TABLET ONE TIME DAILY    metformin (GLUCOPHAGE) 500 MG tablet TAKE 1 TABLET TWICE DAILY WITH MEALS    methocarbamol (ROBAXIN) 750 MG Tab TAKE 1 TABLET FOUR TIMES DAILY    multivitamin (THERAGRAN) tablet Take 1 tablet by mouth once daily.    polyethylene glycol (GLYCOLAX) 17 gram/dose powder MIX 17 GRAMS IN LIQUID AND DRINK EVERY DAY AS NEEDED    silver sulfADIAZINE 1% (SILVADENE) 1 % cream Apply topically 2 (two) times daily.    SPIRIVA WITH HANDIHALER 18 mcg inhalation capsule INHALE THE CONTENTS OF 1 CAPSULE EVERY DAY    temazepam (RESTORIL) 15 mg Cap TAKE 1 CAPSULE ONE TIME DAILY    trazodone (DESYREL) 100 MG tablet TAKE 1 TABLET EVERY NIGHT AS NEEDED  FOR  INSOMNIA    warfarin (COUMADIN) 5 MG tablet TAKE ONE TABLET EVERY DAY OR AS DIRECTED BY COUMADIN CLINIC (Patient taking differently: TAKE ONE TABLET EVERY DAY OR AS DIRECTED BY COUMADIN CLINIC /       7.5 mg Sat and Wed  /      5 mg Mon, Tues, Thurs, Fri, Sun)    blood sugar diagnostic (TRUE METRIX GLUCOSE TEST STRIP) Strp Use to test blood sugar three times a day   DX:E11.9    blood-glucose meter (TRUE METRIX AIR GLUCOSE METER) kit Use as instructed to test blood sugar   DX:E11.9    lancets (TRUEPLUS LANCETS) 33 gauge Misc 1 lancet by Misc.(Non-Drug; Combo Route) route 3 (three) times daily. To test blood sugar   DX: E11.9    metoprolol succinate (TOPROL-XL) 25 MG 24 hr tablet Take 0.5 tablets (12.5 mg total) by mouth once daily. (Patient taking differently: Take 12.5 mg by mouth once daily. On hold waiting for dr visit)    [DISCONTINUED] (Magic mouthwash) 1:1:1 Benadryl 12.5mg/5ml liq, aluminum & magnesium hydroxide-simehticone (Maalox), lidocaine viscous 2% Swish and spit 15 mLs every 4 (four) hours as needed.    [DISCONTINUED] B12-levomefolate calcium-B6 2-1.13-25 mg Tab Take 1 tablet by mouth once daily.    [DISCONTINUED] CALCIUM CARBONATE/VITAMIN D3 (CALCIUM 500 WITH D ORAL) Twice a day    [DISCONTINUED] DOCOSAHEXANOIC ACID/EPA (FISH OIL ORAL) Twice a day    [DISCONTINUED] ferrous gluconate (FERGON) 324 MG tablet Take 1 tablet (324 mg total) by mouth daily with breakfast.    [DISCONTINUED] urea (CARMOL) 40 % Crea Apply topically once daily.     Family History     Problem Relation (Age of Onset)    Arthritis Father, Sister    Blindness Son    Cancer Brother    Crohn's disease Sister    Early death Sister (30)    Heart disease Father, Sister (32), Sister    No Known Problems Brother, Daughter        Social History Main Topics    Smoking status: Current Some Day Smoker     Packs/day: 0.50     Years: 50.00     Types: Cigarettes     Start date: 1/19/1968     Last attempt to quit: 9/19/2015    Smokeless tobacco: Never Used      Comment: 1 ppd for 20 years    Alcohol use No    Drug use: No    Sexual activity: Not Currently     Review of Systems   Constitutional: Positive for fatigue. Negative for diaphoresis and fever.    HENT: Negative for congestion.    Eyes: Negative for pain.   Respiratory: Positive for shortness of breath and wheezing. Negative for cough.    Cardiovascular: Positive for leg swelling. Negative for chest pain.   Gastrointestinal: Negative for abdominal distention and abdominal pain.   Endocrine: Negative for polydipsia and polyphagia.   Genitourinary: Negative for dysuria and hematuria.   Musculoskeletal: Positive for back pain and gait problem.   Skin: Negative for rash.   Neurological: Positive for numbness (neuropathy). Negative for speech difficulty.   Psychiatric/Behavioral: Negative for agitation. The patient is not nervous/anxious.      Objective:     Vital Signs (Most Recent):  Temp: 97.6 °F (36.4 °C) (02/14/17 1321)  Pulse: 91 (02/14/17 1952)  Resp: (!) 21 (02/14/17 1952)  BP: (!) 160/74 (02/14/17 1631)  SpO2: (!) 94 % (02/14/17 1952) Vital Signs (24h Range):  Temp:  [97.6 °F (36.4 °C)] 97.6 °F (36.4 °C)  Pulse:  [74-91] 91  Resp:  [21-36] 21  SpO2:  [90 %-100 %] 94 %  BP: (147-160)/(71-75) 160/74     Weight: (!) 154.2 kg (340 lb)  Body mass index is 51.7 kg/(m^2).    Physical Exam   Constitutional: She is oriented to person, place, and time. She appears well-developed and well-nourished.   HENT:   Head: Normocephalic and atraumatic.   Right Ear: External ear normal.   Left Ear: External ear normal.   Mouth/Throat: Oropharynx is clear and moist.   Eyes: Conjunctivae and EOM are normal. Pupils are equal, round, and reactive to light.   Neck: Normal range of motion. Neck supple.   Thick neck circumference   Cardiovascular: Normal rate, regular rhythm, normal heart sounds and intact distal pulses.    No murmur heard.  +2 pretib/ ankle edema with chronic skin changes   Pulmonary/Chest: Effort normal. No respiratory distress (bibasalar crackles). She has wheezes. She has rales.   Abdominal: Soft. Bowel sounds are normal. She exhibits no distension.   Grossly protuberant.   Musculoskeletal: Normal range of  motion.   Neurological: She is alert and oriented to person, place, and time.   Decreased sensation to bilateral feet.   Skin: Skin is warm and dry.   Psychiatric: She has a normal mood and affect. Her behavior is normal. Judgment normal.   Nursing note and vitals reviewed.       Significant Labs:   BMP:   Recent Labs  Lab 02/14/17  1522   GLU 85      K 3.9   CL 98   CO2 30*   BUN 7*   CREATININE 0.6   CALCIUM 9.0     CBC:   Recent Labs  Lab 02/14/17  1522   WBC 8.90   HGB 11.2*   HCT 37.2          Significant Imaging: I have reviewed and interpreted all pertinent imaging results/findings within the past 24 hours. CXR Cardiomegaly with mild pulmonary edema/CHF pattern, moderate right and possible small left pleural effusions.

## 2017-02-15 NOTE — TELEPHONE ENCOUNTER
Spoke with Chiquis at [facility] ([phone]), who states on 2-14-17 she visited patient and noted patient had sustained multiple falls. Patient's lower extremities were swollen, and patient was SOB. Ms Sim advised patient to be seen at ER. Patient was admitted to hospital. Home Health discharged patient on 2-15-17 due to insurance reasons. Ms Sim encouraged patient to speak with  in hospital regarding new order for home health. Advised Ms Sim the above information would be forwarded to PCP for review.

## 2017-02-15 NOTE — ASSESSMENT & PLAN NOTE
Initiated on Lasix 40 mg IV BID. Trend I&  O.  Trend renal function. Echo pending.   Continue IV lasix, patient diuresing well

## 2017-02-15 NOTE — PROGRESS NOTES
Please note the following patient has been assigned to Jayde Reyes RN CCM,  with Outpatient Complex Care Mgmt for screening.    Please contact [facility] at ext [phone] with questions.    Thank you    Maureen Cabezas, SSC

## 2017-02-15 NOTE — SUBJECTIVE & OBJECTIVE
Interval History: Patient with mild improvement of dyspnea. Good diuresis with IV lasix.  Denies chest pain.     Review of Systems   Constitutional: Positive for fatigue. Negative for diaphoresis and fever.   Respiratory: Positive for cough, shortness of breath and wheezing.    Cardiovascular: Positive for leg swelling. Negative for chest pain.   Gastrointestinal: Negative for abdominal distention and abdominal pain.   Musculoskeletal: Positive for arthralgias and back pain.   Neurological: Negative for speech difficulty. Numbness: peripheral neuropathy      Objective:     Vital Signs (Most Recent):  Temp: 97.8 °F (36.6 °C) (02/15/17 1100)  Pulse: (!) 58 (02/15/17 1145)  Resp: 16 (02/15/17 1145)  BP: 129/66 (02/15/17 1100)  SpO2: 98 % (02/15/17 1145) Vital Signs (24h Range):  Temp:  [97.6 °F (36.4 °C)-98 °F (36.7 °C)] 97.8 °F (36.6 °C)  Pulse:  [58-91] 58  Resp:  [16-36] 16  SpO2:  [90 %-100 %] 98 %  BP: (129-160)/(60-91) 129/66     Weight: (!) 161.6 kg (356 lb 4.8 oz)  Body mass index is 69.59 kg/(m^2).    Intake/Output Summary (Last 24 hours) at 02/15/17 1313  Last data filed at 02/15/17 0427   Gross per 24 hour   Intake             1000 ml   Output              350 ml   Net              650 ml      Physical Exam   Constitutional: She is oriented to person, place, and time. She appears well-developed and well-nourished.   HENT:   Head: Normocephalic and atraumatic.   Mouth/Throat: Oropharynx is clear and moist.   Eyes: Conjunctivae and EOM are normal. Pupils are equal, round, and reactive to light.   Neck: Normal range of motion. Neck supple. No thyromegaly present.   Cardiovascular: Normal rate, regular rhythm, normal heart sounds and intact distal pulses.    + 1 peripheral edema with chronic skin changes    Pulmonary/Chest: Effort normal. She has wheezes. She has rales (bibasalar crackles).   Abdominal: Soft. Bowel sounds are normal. She exhibits no distension. There is no tenderness.   Musculoskeletal: Normal  range of motion.   Neurological: She is alert and oriented to person, place, and time.   Decreased sensation to bilateral feet   Skin: Skin is warm and dry.   Psychiatric: She has a normal mood and affect. Her behavior is normal. Judgment normal.   Nursing note and vitals reviewed.      Significant Labs:   BMP:   Recent Labs  Lab 02/15/17  0509   GLU 97      K 3.7      CO2 29   BUN 9   CREATININE 0.6   CALCIUM 8.6*     CBC:   Recent Labs  Lab 02/14/17  1522 02/15/17  0509   WBC 8.90 8.80   HGB 11.2* 10.8*   HCT 37.2 34.6*    173       Significant Imaging: I have reviewed and interpreted all pertinent imaging results/findings within the past 24 hours.

## 2017-02-15 NOTE — PLAN OF CARE
Problem: Patient Care Overview  Goal: Plan of Care Review  Outcome: Ongoing (interventions implemented as appropriate)  Plan of care and fall risk reviewed, understanding verbalized.  Pt ambulates to bedside commode, remains free from falls/injuries.  Pt NC applies for dyspnea on exertions.  Blood glucose monitored and treated as ordered.  Prn medication given for anxiety. Bed in lowest position, wheels locked, side rail up.  Bedside commode and call light within reach.  Will continue to monitor.

## 2017-02-15 NOTE — PROGRESS NOTES
02/14/17 2000   Patient Assessment/Suction   Level of Consciousness (AVPU) alert   Respiratory Effort Labored;Short of breath   All Lung Fields Breath Sounds coarse   PRE-TX-O2-ETCO2   O2 Device (Oxygen Therapy) nasal cannula   $ Is the patient on Oxygen? Yes   Flow (L/min) 2   Pulse Oximetry Type Intermittent   $ Pulse Oximetry - Multiple Charge Pulse Oximetry - Multiple   Aerosol Therapy   $ Aerosol Therapy Charges Aerosol Treatment   Respiratory Treatment Status given   SVN/Inhaler Treatment Route mask;with oxygen   Position During Treatment HOB at 30-45 degrees   Patient Tolerance good   Post-Treatment   Post-treatment Heart Rate (beats/min) 94   Post-treatment Resp Rate (breaths/min) 20

## 2017-02-16 PROBLEM — F33.1 MODERATE EPISODE OF RECURRENT MAJOR DEPRESSIVE DISORDER: Status: ACTIVE | Noted: 2017-02-16

## 2017-02-16 LAB
ANION GAP SERPL CALC-SCNC: 8 MMOL/L
BASOPHILS # BLD AUTO: 0.1 K/UL
BASOPHILS NFR BLD: 0.7 %
BUN SERPL-MCNC: 9 MG/DL
CALCIUM SERPL-MCNC: 8.7 MG/DL
CHLORIDE SERPL-SCNC: 99 MMOL/L
CO2 SERPL-SCNC: 34 MMOL/L
CREAT SERPL-MCNC: 0.7 MG/DL
DIFFERENTIAL METHOD: ABNORMAL
EOSINOPHIL # BLD AUTO: 0.4 K/UL
EOSINOPHIL NFR BLD: 4.6 %
ERYTHROCYTE [DISTWIDTH] IN BLOOD BY AUTOMATED COUNT: 16.4 %
EST. GFR  (AFRICAN AMERICAN): >60 ML/MIN/1.73 M^2
EST. GFR  (NON AFRICAN AMERICAN): >60 ML/MIN/1.73 M^2
GLUCOSE SERPL-MCNC: 101 MG/DL
HCT VFR BLD AUTO: 35 %
HGB BLD-MCNC: 10.7 G/DL
INR PPP: 2
LYMPHOCYTES # BLD AUTO: 1.5 K/UL
LYMPHOCYTES NFR BLD: 17 %
MCH RBC QN AUTO: 25.3 PG
MCHC RBC AUTO-ENTMCNC: 30.5 %
MCV RBC AUTO: 83 FL
MONOCYTES # BLD AUTO: 1.2 K/UL
MONOCYTES NFR BLD: 13.7 %
NEUTROPHILS # BLD AUTO: 5.6 K/UL
NEUTROPHILS NFR BLD: 64 %
PLATELET # BLD AUTO: 193 K/UL
PMV BLD AUTO: 9.1 FL
POCT GLUCOSE: 107 MG/DL (ref 70–110)
POCT GLUCOSE: 153 MG/DL (ref 70–110)
POCT GLUCOSE: 78 MG/DL (ref 70–110)
POCT GLUCOSE: 89 MG/DL (ref 70–110)
POTASSIUM SERPL-SCNC: 3.6 MMOL/L
PROTHROMBIN TIME: 20.2 SEC
RBC # BLD AUTO: 4.23 M/UL
SODIUM SERPL-SCNC: 141 MMOL/L
WBC # BLD AUTO: 8.7 K/UL

## 2017-02-16 PROCEDURE — 85610 PROTHROMBIN TIME: CPT

## 2017-02-16 PROCEDURE — 80048 BASIC METABOLIC PNL TOTAL CA: CPT

## 2017-02-16 PROCEDURE — 85025 COMPLETE CBC W/AUTO DIFF WBC: CPT

## 2017-02-16 PROCEDURE — 27000221 HC OXYGEN, UP TO 24 HOURS

## 2017-02-16 PROCEDURE — 25000242 PHARM REV CODE 250 ALT 637 W/ HCPCS: Performed by: HOSPITALIST

## 2017-02-16 PROCEDURE — 94640 AIRWAY INHALATION TREATMENT: CPT

## 2017-02-16 PROCEDURE — 25000003 PHARM REV CODE 250: Performed by: PHYSICIAN ASSISTANT

## 2017-02-16 PROCEDURE — 94761 N-INVAS EAR/PLS OXIMETRY MLT: CPT

## 2017-02-16 PROCEDURE — 36415 COLL VENOUS BLD VENIPUNCTURE: CPT

## 2017-02-16 PROCEDURE — 12000002 HC ACUTE/MED SURGE SEMI-PRIVATE ROOM

## 2017-02-16 PROCEDURE — 25000003 PHARM REV CODE 250: Performed by: HOSPITALIST

## 2017-02-16 PROCEDURE — 25000003 PHARM REV CODE 250: Performed by: NURSE PRACTITIONER

## 2017-02-16 PROCEDURE — 63600175 PHARM REV CODE 636 W HCPCS: Performed by: NURSE PRACTITIONER

## 2017-02-16 PROCEDURE — 97116 GAIT TRAINING THERAPY: CPT

## 2017-02-16 PROCEDURE — 97530 THERAPEUTIC ACTIVITIES: CPT

## 2017-02-16 PROCEDURE — 99223 1ST HOSP IP/OBS HIGH 75: CPT | Mod: ,,, | Performed by: INTERNAL MEDICINE

## 2017-02-16 RX ORDER — FERROUS SULFATE 325(65) MG
325 TABLET, DELAYED RELEASE (ENTERIC COATED) ORAL 2 TIMES DAILY WITH MEALS
Status: DISCONTINUED | OUTPATIENT
Start: 2017-02-16 | End: 2017-02-19 | Stop reason: HOSPADM

## 2017-02-16 RX ORDER — ASCORBIC ACID 500 MG
500 TABLET ORAL NIGHTLY
Status: DISCONTINUED | OUTPATIENT
Start: 2017-02-16 | End: 2017-02-19 | Stop reason: HOSPADM

## 2017-02-16 RX ADMIN — IPRATROPIUM BROMIDE AND ALBUTEROL SULFATE 3 ML: .5; 3 SOLUTION RESPIRATORY (INHALATION) at 11:02

## 2017-02-16 RX ADMIN — FUROSEMIDE 40 MG: 10 INJECTION, SOLUTION INTRAMUSCULAR; INTRAVENOUS at 09:02

## 2017-02-16 RX ADMIN — IPRATROPIUM BROMIDE AND ALBUTEROL SULFATE 3 ML: .5; 3 SOLUTION RESPIRATORY (INHALATION) at 07:02

## 2017-02-16 RX ADMIN — HYDROCODONE BITARTRATE AND ACETAMINOPHEN 1 TABLET: 10; 325 TABLET ORAL at 03:02

## 2017-02-16 RX ADMIN — CLONAZEPAM 1 MG: 1 TABLET ORAL at 09:02

## 2017-02-16 RX ADMIN — MIRTAZAPINE 15 MG: 15 TABLET, FILM COATED ORAL at 09:02

## 2017-02-16 RX ADMIN — LISINOPRIL 20 MG: 10 TABLET ORAL at 09:02

## 2017-02-16 RX ADMIN — GABAPENTIN 600 MG: 300 CAPSULE ORAL at 09:02

## 2017-02-16 RX ADMIN — OXYCODONE HYDROCHLORIDE AND ACETAMINOPHEN 500 MG: 500 TABLET ORAL at 09:02

## 2017-02-16 RX ADMIN — FERROUS SULFATE TAB EC 325 MG (65 MG FE EQUIVALENT) 325 MG: 325 (65 FE) TABLET DELAYED RESPONSE at 04:02

## 2017-02-16 RX ADMIN — HYDROCODONE BITARTRATE AND ACETAMINOPHEN 1 TABLET: 10; 325 TABLET ORAL at 01:02

## 2017-02-16 RX ADMIN — FUROSEMIDE 40 MG: 10 INJECTION, SOLUTION INTRAMUSCULAR; INTRAVENOUS at 04:02

## 2017-02-16 RX ADMIN — THERA TABS 1 TABLET: TAB at 09:02

## 2017-02-16 RX ADMIN — WARFARIN SODIUM 5 MG: 5 TABLET ORAL at 04:02

## 2017-02-16 RX ADMIN — METOPROLOL SUCCINATE 12.5 MG: 25 TABLET, EXTENDED RELEASE ORAL at 09:02

## 2017-02-16 RX ADMIN — FERROUS SULFATE TAB EC 325 MG (65 MG FE EQUIVALENT) 325 MG: 325 (65 FE) TABLET DELAYED RESPONSE at 09:02

## 2017-02-16 RX ADMIN — IPRATROPIUM BROMIDE AND ALBUTEROL SULFATE 3 ML: .5; 3 SOLUTION RESPIRATORY (INHALATION) at 03:02

## 2017-02-16 RX ADMIN — ATORVASTATIN CALCIUM 20 MG: 20 TABLET, FILM COATED ORAL at 09:02

## 2017-02-16 RX ADMIN — HYDROCODONE BITARTRATE AND ACETAMINOPHEN 1 TABLET: 10; 325 TABLET ORAL at 09:02

## 2017-02-16 NOTE — CONSULTS
[facility]  Cardiology  Consult Note    Patient Name: Nelly Tian  MRN: [id number]  Admission Date: 2017  Hospital Length of Stay: 2 days  Code Status: Full Code   Attending Provider: Dion Aguirre MD   Consulting Provider: Jessie Gallagher NP  Primary Care Physician: Constantine Bird MD  Principal Problem:<principal problem not specified>    Patient information was obtained from patient and medical record.     Inpatient consult to Cardiology  Consult performed by: JESSIE GALLAGHER  Consult ordered by: DION AGUIRRE  Reason for consult: CHF      This patient is known to me, Jessie Gallagher NP. This is a new patient to my collaborating physician, Dr. Arellano.  Patient is also known to Dr. Mack who is off today.   Subjective:     Chief Complaint:  SOB    HPI:   PCP: Dr. Bird   Cardiologist: Previously Dr. Torres, last seen 2015  Pain Management Specialist: Dr. Rosenbaum  Pulmonology: Dr. Tolbert, last seen 3 months ago     Patient lives with daughter Maye and her children. Maye is an outdoor smoker.  Patient quit smoking a little over a year ago after completing smoking cessation program but started smoking again 5 months ago. She recently quit smoking again about 2 weeks ago.    Denies ETOH consumption.   Patient is a former  with the [employer].   Patient has been receiving [facility] care.      Cardiology consultation for CHF in a 67 y/o WF with a h/o chronic afib, long-term anticoagulant use, nonobstructive CAD, CHF, PVD, HTN, obesity, type 2 DM, AMY, COPD, home O2 use, tobacco dependency and depression.    Patient presented to the ED two days ago with a c/o a 1.5-2 week h/o gradually worsening SOB with associated chest congestion, nonproductive cough, worsening orthopnea, and bilateral leg swelling.  She also admits to associated subjective weight gain; however, she mentions that she has not been able to weigh herself at home because the scale batteries .  SOB is  "worsened by activity. She tried taking an extra Lasix dose but did not notice any improvement in her symptoms. She came to the ER after being evaluated by her HH nurse who felt like she "was going into CHF."  She denies associated fever, CP, sweats, abdominal pain, n/v, syncope.       She denies missed Lasix doses at home. Patient does admit to being noncompliant with a heart healthy low sodium diet.    Activity is mostly sedentary.  Patient expresses that her SOB and weight make walking difficult so she uses a mobility scooter for ambulation assistance.     No prior sleep study.  Patient reports that she has missed a few appointments in the past for sleep apnea eval.    Patient with chronic NSAID use.  Currently on Mobic for chronic knee pain.     BNP mildly elevated at 172.  CXR concerning for mild CHF pattern. No elevation in troponin x 1 or acute ischemic changes on EKG. Repeat echo done this admission shows EF of 51% vs 56% on prior echo done in 11/2016 when she admitted for CHF and pneumonia with similar complaints. Since inpatient, she has been diuresed with IV Lasix with overall improvement noted. I&O net negative 2,600ml since admission.  UOP of 3,250ml yesterday.  No evidence of significant electrolyte disturbance or kidney injury upon review of labs.  BP stable.  O2 sats 94% on 2L NC.  Afib on telemetry, rates mostly in 60s and 70s with occasional PVCs, on BB (Troprol 12.5mg).  Toprol listed on patient's home medication list; however, she has not been this medication because she was confused on how to take it when receiving a prescription recently.  She has a high [id number] score and is on OAC (coumadin).  INR 2.0 today.              Past Medical History   Diagnosis Date    *Atrial flutter     Anxiety     Arthritis     Asthma     Atrial fibrillation     Back pain     Cataract      OD    CHF (congestive heart failure)     COPD (chronic obstructive pulmonary disease)     Depression     " Diabetes mellitus     Emphysema of lung     Heart failure     Hernia     Hypercapnic respiratory failure, chronic 2016    Hyperlipidemia     Hypertension     Iron deficiency anemia 2/3/2016    Myocardial infarction     Obesity     Peripheral vascular disease     Pneumonia     Polyneuropathy     Retinal detachment      OS    Tobacco dependence     Type II 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, not stated as uncontrolled        Past Surgical History   Procedure Laterality Date     section       x2    Oophorectomy       one ovary conserved    Retinal detachment surgery       buckle --OS    Cataract extraction Left      OS     Eye surgery      Hysterectomy         Review of patient's allergies indicates:   Allergen Reactions    Dilaudid [hydromorphone] Anaphylaxis     Other reaction(s): Anaphylaxis  Other reaction(s): Unknown       No current facility-administered medications on file prior to encounter.      Current Outpatient Prescriptions on File Prior to Encounter   Medication Sig    acetaminophen (TYLENOL) 325 MG tablet Take 2 tablets (650 mg total) by mouth every 6 (six) hours as needed.    albuterol (ACCUNEB) 0.63 mg/3 mL Nebu INHALE THE CONTENTS OF 1 VIAL  BY  NEBULIZER EVERY 6 HOURS AS NEEDED    albuterol (VENTOLIN HFA) 90 mcg/actuation inhaler INHALE TWO PUFFS BY MOUTH EVERY 6 HOURS AS NEEDED FOR WHEEZING    albuterol-ipratropium 2.5mg-0.5mg/3mL (DUO-NEB) 0.5 mg-3 mg(2.5 mg base)/3 mL nebulizer solution INHALE THE CONTENTS OF 1 VIAL VIA NEBULIZER EVERY 6 HOURS AS NEEDED FOR  WHEEZING (DO NOT USE WITH ALBUTEROL INHALER)    atorvastatin (LIPITOR) 20 MG tablet Take 1 tablet (20 mg total) by mouth once daily.    blood sugar diagnostic (TRUE METRIX GLUCOSE TEST STRIP) Strp Use to test blood sugar three times a day   DX:E11.9    blood-glucose meter (TRUE METRIX AIR GLUCOSE METER) kit Use as instructed to test blood sugar   DX:E11.9     budesonide (PULMICORT) 0.5 mg/2 mL nebulizer solution INHALE THE CONTENTS OF 1 VIAL VIA NEBULIZER EVERY DAY    clonazePAM (KLONOPIN) 1 MG tablet Take 1 tablet (1 mg total) by mouth 2 (two) times daily as needed for Anxiety.    escitalopram oxalate (LEXAPRO) 10 MG tablet TAKE 1 TABLET ONE TIME DAILY    furosemide (LASIX) 40 MG tablet TAKE ONE TABLET ONCE DAILY FOR FLUID OVERLOAD    gabapentin (NEURONTIN) 600 MG tablet Take 1 tablet (600 mg total) by mouth 3 (three) times daily. (Patient taking differently: Take 600 mg by mouth 2 (two) times daily. )    hydrocodone-acetaminophen 10-325mg (NORCO)  mg Tab Take 1 tablet by mouth every 8 (eight) hours as needed. (Patient taking differently: Take 1 tablet by mouth every 8 (eight) hours as needed for Pain. )    lancets (TRUEPLUS LANCETS) 33 gauge Misc 1 lancet by Misc.(Non-Drug; Combo Route) route 3 (three) times daily. To test blood sugar   DX: E11.9    levalbuterol (XOPENEX) 0.63 mg/3 mL nebulizer solution INHALE THE CONTENTS OF 1 VIAL BY NEBULIZATION 3 (THREE) TIMES DAILY.    lisinopril (PRINIVIL,ZESTRIL) 20 MG tablet TAKE 1 TABLET ONE TIME DAILY    meloxicam (MOBIC) 7.5 MG tablet TAKE 1 TABLET ONE TIME DAILY    metformin (GLUCOPHAGE) 500 MG tablet TAKE 1 TABLET TWICE DAILY WITH MEALS    methocarbamol (ROBAXIN) 750 MG Tab TAKE 1 TABLET FOUR TIMES DAILY    multivitamin (THERAGRAN) tablet Take 1 tablet by mouth once daily.    polyethylene glycol (GLYCOLAX) 17 gram/dose powder MIX 17 GRAMS IN LIQUID AND DRINK EVERY DAY AS NEEDED    silver sulfADIAZINE 1% (SILVADENE) 1 % cream Apply topically 2 (two) times daily.    SPIRIVA WITH HANDIHALER 18 mcg inhalation capsule INHALE THE CONTENTS OF 1 CAPSULE EVERY DAY    temazepam (RESTORIL) 15 mg Cap TAKE 1 CAPSULE ONE TIME DAILY    trazodone (DESYREL) 100 MG tablet TAKE 1 TABLET EVERY NIGHT AS NEEDED  FOR  INSOMNIA    warfarin (COUMADIN) 5 MG tablet TAKE ONE TABLET EVERY DAY OR AS DIRECTED BY COUMADIN CLINIC  "(Patient taking differently: TAKE ONE TABLET EVERY DAY OR AS DIRECTED BY COUMADIN CLINIC /       7.5 mg Sat and Wed /      5 mg Mon, Tues, Thurs, Fri, Sun)    metoprolol succinate (TOPROL-XL) 25 MG 24 hr tablet Take 0.5 tablets (12.5 mg total) by mouth once daily. (Patient taking differently: Take 12.5 mg by mouth once daily. On hold waiting for dr visit)     Family History     Problem Relation (Age of Onset)    Arthritis Father, Sister    Blindness Son    Cancer Brother    Crohn's disease Sister    Early death Sister (30)    Heart disease Father, Sister (32), Sister    No Known Problems Brother, Daughter        Social History Main Topics    Smoking status: Current Some Day Smoker     Packs/day: 0.50     Years: 50.00     Types: Cigarettes     Start date: 1/19/1968     Last attempt to quit: 9/19/2015    Smokeless tobacco: Former User     Quit date: 2/12/2017      Comment: 1 ppd for 20 years    Alcohol use No    Drug use: No    Sexual activity: Not Currently     Review of Systems   Constitution: Positive for weakness, malaise/fatigue (fatigue ) and weight gain (subjective). Negative for chills and diaphoresis.   HENT: Negative for congestion, headaches and sore throat.    Eyes: Negative for visual disturbance.   Cardiovascular: Positive for dyspnea on exertion, leg swelling, orthopnea (chronic, worse over past 2 weeks) and palpitations. Negative for chest pain and near-syncope.   Respiratory: Positive for cough and shortness of breath. Negative for hemoptysis and sputum production.    Endocrine: Positive for polydipsia. Negative for cold intolerance, heat intolerance and polyphagia.   Hematologic/Lymphatic: Does not bruise/bleed easily.   Skin: Negative for color change and rash.        Left buttock wound, "bed sore," x 2 months, has been  managed by    Musculoskeletal: Positive for back pain (chronic LBP, no change ). Negative for arthritis and joint swelling.   Gastrointestinal: Negative for abdominal pain, " constipation, diarrhea, heartburn, melena, nausea and vomiting.   Genitourinary: Negative for dysuria and hematuria.   Neurological: Negative for aphonia, dizziness, focal weakness, light-headedness, numbness, paresthesias and seizures.   Psychiatric/Behavioral: Negative for depression.   Allergic/Immunologic: Negative for persistent infections.     Objective:     Vital Signs (Most Recent):  Temp: 97.6 °F (36.4 °C) (02/16/17 1222)  Pulse: 73 (02/16/17 1222)  Resp: 20 (02/16/17 1222)  BP: (!) 124/58 (02/16/17 1222)  SpO2: (!) 94 % (02/16/17 1125) Vital Signs (24h Range):  Temp:  [97.5 °F (36.4 °C)-97.9 °F (36.6 °C)] 97.6 °F (36.4 °C)  Pulse:  [57-96] 73  Resp:  [15-20] 20  SpO2:  [94 %-97 %] 94 %  BP: (100-129)/(51-59) 124/58     Weight: (!) 158 kg (348 lb 6.4 oz)  Body mass index is 68.04 kg/(m^2).    SpO2: (!) 94 %  O2 Device (Oxygen Therapy): nasal cannula      Intake/Output Summary (Last 24 hours) at 02/16/17 1425  Last data filed at 02/16/17 0416   Gross per 24 hour   Intake                0 ml   Output             2000 ml   Net            -2000 ml       Lines/Drains/Airways     Peripheral Intravenous Line                 Peripheral IV - Single Lumen 11/03/16 1440 Right Forearm 104 days         Peripheral IV - Single Lumen 02/16/17 1016 Right Hand less than 1 day                Physical Exam   Constitutional: She is oriented to person, place, and time. She appears well-developed and well-nourished. No distress.   HENT:   Head: Normocephalic and atraumatic.   Eyes: Conjunctivae and EOM are normal. Pupils are equal, round, and reactive to light. Right eye exhibits no discharge. Left eye exhibits no discharge. No scleral icterus.   Neck: Normal range of motion. Neck supple. Carotid bruit is not present.   JVD assessment limited by body habitus.    Cardiovascular: Normal rate.  An irregularly irregular rhythm present.   Pulses:       Radial pulses are 2+ on the right side, and 2+ on the left side.        Dorsalis  pedis pulses are 1+ on the right side, and 1+ on the left side.   Distant heart tones   Pulmonary/Chest: Effort normal and breath sounds normal. No respiratory distress. She has no wheezes. She has no rales.   Abdominal: Soft. Bowel sounds are normal. There is no tenderness. There is no guarding.   Musculoskeletal:   Trace - 1+ pitting edema right and left lower legs.    Neurological: She is alert and oriented to person, place, and time.   No focal deficits.    Skin: Skin is warm and dry. She is not diaphoretic. No cyanosis. Nails show no clubbing.   Psychiatric: She has a normal mood and affect. Her behavior is normal.   Nursing note and vitals reviewed.      Significant Labs:   BMP:   Recent Labs  Lab 02/14/17  1522 02/15/17  0509 02/16/17  0445   GLU 85 97 101    140 141   K 3.9 3.7 3.6   CL 98 100 99   CO2 30* 29 34*   BUN 7* 9 9   CREATININE 0.6 0.6 0.7   CALCIUM 9.0 8.6* 8.7   , CBC   Recent Labs  Lab 02/14/17  1522 02/15/17  0509 02/16/17  0445   WBC 8.90 8.80 8.70   HGB 11.2* 10.8* 10.7*   HCT 37.2 34.6* 35.0*    173 193   , INR   Recent Labs  Lab 02/14/17  1522 02/15/17  0858 02/16/17  0445   INR 2.2* 1.7* 2.0*    and Troponin   Recent Labs  Lab 02/14/17  1522   TROPONINI 0.014       Significant Imaging:   Echo 02/15/2017:  CONCLUSIONS     1 - Enlarged left ventricular enlargement.     2 - Eccentric hypertrophy.     3 - Low normal to mildly depressed left ventricular systolic function (EF 50-55%).     4 - Right ventricular enlargement with not well seen systolic function.     5 - The estimated PA systolic pressure is 39 mmHg.     6 - Very difficult windows, Optison contrast used for wall motion analysis.     7 - Suggestion for reduced LV function from Echo in 11/2016.     CXR 02/14/2017:  1.  Cardiomegaly with mild pulmonary edema/CHF pattern, moderate right and possible small left pleural effusions.    EKG 02/14/2017: Atrial fibrillation rate of 70 bpm, low voltage, possible anterolateral  infarct (age undetermined).     Prior Echo 11/2016:  CONCLUSIONS     1 - Mild left ventricular enlargement.     2 - Concentric hypertrophy.     3 - Normal left ventricular systolic function (EF 55-60%).     4 - Right ventricular enlargement with not well seen systolic function.     5 - The estimated PA systolic pressure is 31 mmHg.     6 - Intermediate central venous pressure.     7 - Difficult windows, Optison contrast used for wall motion analysis.     8 - No significant change from Echo in 11/2014.     Prior LHC 11/2014:  B. HEMODYNAMIC RESULTS:  LVEDP (Pre): 16 mmHg  LVEDP (Post): 16 mmHg  Ejection Fraction: 55%    C. ANGIOGRAPHIC RESULTS:  DIAGNOSTIC:       Patient has a right dominant coronary artery.        - Left Main Coronary Artery:             The LM is normal. There is BRIGID 3 flow.       - Left Anterior Descending Artery:             The LAD has luminal irregularities. There is BRIGID 3 flow.       - Left Circumflex Artery:             The LCX has luminal irregularities. There is BRIGID 3 flow.       - Right Coronary Artery:             The RCA has luminal irregularities. There is BRIGID 3 flow. Small non-dominant artery.    D. SUMMARY:  1. Non-obstructive CAD.  2. Normal LVEF.    E. RECOMMENDATIONS:  1. Reassurance.  2. Risk factor reductions.  3. ASA 81mg.  4. Weight loss.  5. Follow up with Dr. Natan Torres.    Assessment and Plan:     Active Hospital Problems    Diagnosis    Moderate episode of recurrent major depressive disorder    Acute on chronic diastolic congestive heart failure    Acute on chronic respiratory failure with hypoxemia    Iron deficiency anemia    PVD (peripheral vascular disease)    Morbid obesity with BMI of 45.0-49.9, adult    Abdominal aortic atherosclerosis    Hypertension associated with diabetes    Chronic atrial fibrillation    Tobacco dependency    Chronic obstructive pulmonary disease with acute exacerbation    Diabetes mellitus with neuropathy     Cardiology  consultation for CHF in a 67 y/o WF with a h/o chronic afib, long-term anticoagulant use, nonobstructive CAD, CHF, PVD, HTN, obesity, type 2 DM, AMY, COPD, home O2 use, tobacco dependency and depression.  Patient presented to the ED two days ago with a c/o a 1.5-2 week h/o gradually worsening SOB with associated chest congestion, nonproductive cough, worsening orthopnea, and bilateral leg swelling. Denies CP.  BNP mildly elevated at 172. CXR concerning for mild CHF pattern. No elevation in troponin x 1 or acute ischemic changes on EKG. Repeat echo done this admission shows EF of 51% vs 56% on prior echo done in 11/2016 when she was admitted for CHF and pneumonia with similar complaints. I&O net negative 2,600ml since admission.  UOP of 3,250ml yesterday.  No evidence of significant electrolyte disturbance or kidney injury upon review of labs.  BP stable.  O2 sats 94% on 2L NC.  Afib on telemetry, rates mostly in 60s and 70s with occasional PVCs, on BB (Troprol 12.5mg).      Since inpatient, she has been diuresed with IV Lasix with improvement in symptoms.     - continue coumadin, BB, statin, ACEI and lasix.    - patient should continue her home regimen of lasix after being discharged with instructions to take an additional tablet daily  as needed for ankle swelling with SOB, or weight gain of 3 lbs overnight or 5 lbs for the week.   - has chronic afib with high [id number] score, INR 2.0, continue OAC (coumadin).  Rate adequately controlled on BB (Toprol 12.5mg) which patient has not been taking at home d/t being confused on how to take the medication.         - medications reviewed with patient and she verbalized understanding.   - Patient needs to improve conditioning and functional status.  Stressed importance of weight loss.  Patient could benefit from PT or cardiac rehab.    - Needs improved compliance with low sodium diet. Consider dietician/nutritional consultation.   - Chronic NSAID use.  Patient advised to  avoid Mobic and all other NSAIDs  - quit smoking 2 weeks ago.  Patient could benefit from outpatient sleep study.  She reports missing sleep apnea eval appointments in the past.  Patient advised on close f/u care with her pulmonologist (Dr. Tolbert).   - Patient should f/u in cardiology clinic in 2 weeks.       Jessie Gallagher NP  Cardiology   [facility]

## 2017-02-16 NOTE — PLAN OF CARE
Problem: Patient Care Overview  Goal: Plan of Care Review  Outcome: Ongoing (interventions implemented as appropriate)  Pt received Klonopin 1 mg at 2203. She slept through rest of evening without complaints. Blood sugars monitored. Daily weight obtained,. Mepelex to left hip replaced. Safety maintained. Call light within reach.

## 2017-02-16 NOTE — PLAN OF CARE
Problem: Patient Care Overview  Goal: Plan of Care Review  Outcome: Ongoing (interventions implemented as appropriate)  Pt received on 2 lpm NC. Aerosol Tx given, tolerated well.

## 2017-02-16 NOTE — SUBJECTIVE & OBJECTIVE
Past Medical History   Diagnosis Date    *Atrial flutter     Anxiety     Arthritis     Asthma     Atrial fibrillation     Back pain     Cataract      OD    CHF (congestive heart failure)     COPD (chronic obstructive pulmonary disease)     Depression     Diabetes mellitus     Emphysema of lung     Heart failure     Hernia     Hypercapnic respiratory failure, chronic 2016    Hyperlipidemia     Hypertension     Iron deficiency anemia 2/3/2016    Myocardial infarction     Obesity     Peripheral vascular disease     Pneumonia     Polyneuropathy     Retinal detachment      OS    Tobacco dependence     Type II 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, not stated as uncontrolled        Past Surgical History   Procedure Laterality Date     section       x2    Oophorectomy       one ovary conserved    Retinal detachment surgery       buckle --OS    Cataract extraction Left      OS     Eye surgery      Hysterectomy         Review of patient's allergies indicates:   Allergen Reactions    Dilaudid [hydromorphone] Anaphylaxis     Other reaction(s): Anaphylaxis  Other reaction(s): Unknown       No current facility-administered medications on file prior to encounter.      Current Outpatient Prescriptions on File Prior to Encounter   Medication Sig    acetaminophen (TYLENOL) 325 MG tablet Take 2 tablets (650 mg total) by mouth every 6 (six) hours as needed.    albuterol (ACCUNEB) 0.63 mg/3 mL Nebu INHALE THE CONTENTS OF 1 VIAL  BY  NEBULIZER EVERY 6 HOURS AS NEEDED    albuterol (VENTOLIN HFA) 90 mcg/actuation inhaler INHALE TWO PUFFS BY MOUTH EVERY 6 HOURS AS NEEDED FOR WHEEZING    albuterol-ipratropium 2.5mg-0.5mg/3mL (DUO-NEB) 0.5 mg-3 mg(2.5 mg base)/3 mL nebulizer solution INHALE THE CONTENTS OF 1 VIAL VIA NEBULIZER EVERY 6 HOURS AS NEEDED FOR  WHEEZING (DO NOT USE WITH ALBUTEROL INHALER)    atorvastatin (LIPITOR) 20 MG tablet Take 1 tablet (20 mg  total) by mouth once daily.    blood sugar diagnostic (TRUE METRIX GLUCOSE TEST STRIP) Strp Use to test blood sugar three times a day   DX:E11.9    blood-glucose meter (TRUE METRIX AIR GLUCOSE METER) kit Use as instructed to test blood sugar   DX:E11.9    budesonide (PULMICORT) 0.5 mg/2 mL nebulizer solution INHALE THE CONTENTS OF 1 VIAL VIA NEBULIZER EVERY DAY    clonazePAM (KLONOPIN) 1 MG tablet Take 1 tablet (1 mg total) by mouth 2 (two) times daily as needed for Anxiety.    escitalopram oxalate (LEXAPRO) 10 MG tablet TAKE 1 TABLET ONE TIME DAILY    furosemide (LASIX) 40 MG tablet TAKE ONE TABLET ONCE DAILY FOR FLUID OVERLOAD    gabapentin (NEURONTIN) 600 MG tablet Take 1 tablet (600 mg total) by mouth 3 (three) times daily. (Patient taking differently: Take 600 mg by mouth 2 (two) times daily. )    hydrocodone-acetaminophen 10-325mg (NORCO)  mg Tab Take 1 tablet by mouth every 8 (eight) hours as needed. (Patient taking differently: Take 1 tablet by mouth every 8 (eight) hours as needed for Pain. )    lancets (TRUEPLUS LANCETS) 33 gauge Misc 1 lancet by Misc.(Non-Drug; Combo Route) route 3 (three) times daily. To test blood sugar   DX: E11.9    levalbuterol (XOPENEX) 0.63 mg/3 mL nebulizer solution INHALE THE CONTENTS OF 1 VIAL BY NEBULIZATION 3 (THREE) TIMES DAILY.    lisinopril (PRINIVIL,ZESTRIL) 20 MG tablet TAKE 1 TABLET ONE TIME DAILY    meloxicam (MOBIC) 7.5 MG tablet TAKE 1 TABLET ONE TIME DAILY    metformin (GLUCOPHAGE) 500 MG tablet TAKE 1 TABLET TWICE DAILY WITH MEALS    methocarbamol (ROBAXIN) 750 MG Tab TAKE 1 TABLET FOUR TIMES DAILY    multivitamin (THERAGRAN) tablet Take 1 tablet by mouth once daily.    polyethylene glycol (GLYCOLAX) 17 gram/dose powder MIX 17 GRAMS IN LIQUID AND DRINK EVERY DAY AS NEEDED    silver sulfADIAZINE 1% (SILVADENE) 1 % cream Apply topically 2 (two) times daily.    SPIRIVA WITH HANDIHALER 18 mcg inhalation capsule INHALE THE CONTENTS OF 1 CAPSULE  EVERY DAY    temazepam (RESTORIL) 15 mg Cap TAKE 1 CAPSULE ONE TIME DAILY    trazodone (DESYREL) 100 MG tablet TAKE 1 TABLET EVERY NIGHT AS NEEDED  FOR  INSOMNIA    warfarin (COUMADIN) 5 MG tablet TAKE ONE TABLET EVERY DAY OR AS DIRECTED BY COUMADIN CLINIC (Patient taking differently: TAKE ONE TABLET EVERY DAY OR AS DIRECTED BY COUMADIN CLINIC /       7.5 mg Sat and Wed /      5 mg Mon, Tues, Thurs, Fri, Sun)    metoprolol succinate (TOPROL-XL) 25 MG 24 hr tablet Take 0.5 tablets (12.5 mg total) by mouth once daily. (Patient taking differently: Take 12.5 mg by mouth once daily. On hold waiting for dr visit)     Family History     Problem Relation (Age of Onset)    Arthritis Father, Sister    Blindness Son    Cancer Brother    Crohn's disease Sister    Early death Sister (30)    Heart disease Father, Sister (32), Sister    No Known Problems Brother, Daughter        Social History Main Topics    Smoking status: Current Some Day Smoker     Packs/day: 0.50     Years: 50.00     Types: Cigarettes     Start date: 1/19/1968     Last attempt to quit: 9/19/2015    Smokeless tobacco: Former User     Quit date: 2/12/2017      Comment: 1 ppd for 20 years    Alcohol use No    Drug use: No    Sexual activity: Not Currently     Review of Systems   Constitution: Positive for weakness, malaise/fatigue (fatigue ) and weight gain (subjective). Negative for chills and diaphoresis.   HENT: Negative for congestion, headaches and sore throat.    Eyes: Negative for visual disturbance.   Cardiovascular: Positive for dyspnea on exertion, leg swelling, orthopnea (chronic, worse over past 2 weeks) and palpitations. Negative for chest pain and near-syncope.   Respiratory: Positive for cough and shortness of breath. Negative for hemoptysis and sputum production.    Endocrine: Positive for polydipsia. Negative for cold intolerance, heat intolerance and polyphagia.   Hematologic/Lymphatic: Does not bruise/bleed easily.   Skin: Negative for  "color change and rash.        Left buttock wound, "bed sore," x 2 months, has been  managed by    Musculoskeletal: Positive for back pain (chronic LBP, no change ). Negative for arthritis and joint swelling.   Gastrointestinal: Negative for abdominal pain, constipation, diarrhea, heartburn, melena, nausea and vomiting.   Genitourinary: Negative for dysuria and hematuria.   Neurological: Negative for aphonia, dizziness, focal weakness, light-headedness, numbness, paresthesias and seizures.   Psychiatric/Behavioral: Negative for depression.   Allergic/Immunologic: Negative for persistent infections.     Objective:     Vital Signs (Most Recent):  Temp: 97.6 °F (36.4 °C) (02/16/17 1222)  Pulse: 73 (02/16/17 1222)  Resp: 20 (02/16/17 1222)  BP: (!) 124/58 (02/16/17 1222)  SpO2: (!) 94 % (02/16/17 1125) Vital Signs (24h Range):  Temp:  [97.5 °F (36.4 °C)-97.9 °F (36.6 °C)] 97.6 °F (36.4 °C)  Pulse:  [57-96] 73  Resp:  [15-20] 20  SpO2:  [94 %-97 %] 94 %  BP: (100-129)/(51-59) 124/58     Weight: (!) 158 kg (348 lb 6.4 oz)  Body mass index is 68.04 kg/(m^2).    SpO2: (!) 94 %  O2 Device (Oxygen Therapy): nasal cannula      Intake/Output Summary (Last 24 hours) at 02/16/17 1425  Last data filed at 02/16/17 0416   Gross per 24 hour   Intake                0 ml   Output             2000 ml   Net            -2000 ml       Lines/Drains/Airways     Peripheral Intravenous Line                 Peripheral IV - Single Lumen 11/03/16 1440 Right Forearm 104 days         Peripheral IV - Single Lumen 02/16/17 1016 Right Hand less than 1 day                Physical Exam   Constitutional: She is oriented to person, place, and time. She appears well-developed and well-nourished. No distress.   HENT:   Head: Normocephalic and atraumatic.   Eyes: Conjunctivae and EOM are normal. Pupils are equal, round, and reactive to light. Right eye exhibits no discharge. Left eye exhibits no discharge. No scleral icterus.   Neck: Normal range of motion. " Neck supple. Carotid bruit is not present.   JVD assessment limited by body habitus.    Cardiovascular: Normal rate.  An irregularly irregular rhythm present.   Pulses:       Radial pulses are 2+ on the right side, and 2+ on the left side.        Dorsalis pedis pulses are 1+ on the right side, and 1+ on the left side.   Distant heart tones   Pulmonary/Chest: Effort normal and breath sounds normal. No respiratory distress. She has no wheezes. She has no rales.   Abdominal: Soft. Bowel sounds are normal. There is no tenderness. There is no guarding.   Musculoskeletal:   Trace - 1+ pitting edema right and left lower legs.    Neurological: She is alert and oriented to person, place, and time.   No focal deficits.    Skin: Skin is warm and dry. She is not diaphoretic. No cyanosis. Nails show no clubbing.   Psychiatric: She has a normal mood and affect. Her behavior is normal.   Nursing note and vitals reviewed.      Significant Labs:   BMP:   Recent Labs  Lab 02/14/17  1522 02/15/17  0509 02/16/17  0445   GLU 85 97 101    140 141   K 3.9 3.7 3.6   CL 98 100 99   CO2 30* 29 34*   BUN 7* 9 9   CREATININE 0.6 0.6 0.7   CALCIUM 9.0 8.6* 8.7   , CBC   Recent Labs  Lab 02/14/17  1522 02/15/17  0509 02/16/17  0445   WBC 8.90 8.80 8.70   HGB 11.2* 10.8* 10.7*   HCT 37.2 34.6* 35.0*    173 193   , INR   Recent Labs  Lab 02/14/17  1522 02/15/17  0858 02/16/17  0445   INR 2.2* 1.7* 2.0*    and Troponin   Recent Labs  Lab 02/14/17  1522   TROPONINI 0.014       Significant Imaging:   Echo 02/15/2017:  CONCLUSIONS     1 - Enlarged left ventricular enlargement.     2 - Eccentric hypertrophy.     3 - Low normal to mildly depressed left ventricular systolic function (EF 50-55%).     4 - Right ventricular enlargement with not well seen systolic function.     5 - The estimated PA systolic pressure is 39 mmHg.     6 - Very difficult windows, Optison contrast used for wall motion analysis.     7 - Suggestion for reduced LV  function from Echo in 11/2016.     CXR 02/14/2017:  1.  Cardiomegaly with mild pulmonary edema/CHF pattern, moderate right and possible small left pleural effusions.    EKG 02/14/2017: Atrial fibrillation rate of 70 bpm, low voltage, possible anterolateral infarct (age undetermined).     Prior Echo 11/2016:  CONCLUSIONS     1 - Mild left ventricular enlargement.     2 - Concentric hypertrophy.     3 - Normal left ventricular systolic function (EF 55-60%).     4 - Right ventricular enlargement with not well seen systolic function.     5 - The estimated PA systolic pressure is 31 mmHg.     6 - Intermediate central venous pressure.     7 - Difficult windows, Optison contrast used for wall motion analysis.     8 - No significant change from Echo in 11/2014.     Prior LHC 11/2014:  B. HEMODYNAMIC RESULTS:  LVEDP (Pre): 16 mmHg  LVEDP (Post): 16 mmHg  Ejection Fraction: 55%    C. ANGIOGRAPHIC RESULTS:  DIAGNOSTIC:       Patient has a right dominant coronary artery.        - Left Main Coronary Artery:             The LM is normal. There is BRIGID 3 flow.       - Left Anterior Descending Artery:             The LAD has luminal irregularities. There is BRIGID 3 flow.       - Left Circumflex Artery:             The LCX has luminal irregularities. There is BRIGID 3 flow.       - Right Coronary Artery:             The RCA has luminal irregularities. There is BRIGID 3 flow. Small non-dominant artery.    D. SUMMARY:  1. Non-obstructive CAD.  2. Normal LVEF.    E. RECOMMENDATIONS:  1. Reassurance.  2. Risk factor reductions.  3. ASA 81mg.  4. Weight loss.  5. Follow up with Dr. Natan Torres.

## 2017-02-16 NOTE — PROGRESS NOTES
02/16/17 0742   Patient Assessment/Suction   Level of Consciousness (AVPU) alert   Respiratory Effort Unlabored   All Lung Fields Breath Sounds coarse   Cough Type good;nonproductive   PRE-TX-O2-ETCO2   O2 Device (Oxygen Therapy) nasal cannula   $ Is the patient on Oxygen? Yes   Flow (L/min) 2   SpO2 96 %   Pulse Oximetry Type Intermittent   $ Pulse Oximetry - Multiple Charge Pulse Oximetry - Multiple   Pulse 63   Resp 16   Temp 97.9 °F (36.6 °C)   BP (!) 120/55   Aerosol Therapy   $ Aerosol Therapy Charges Aerosol Treatment   Respiratory Treatment Status given   SVN/Inhaler Treatment Route mask   Position During Treatment HOB at 90 degrees   Patient Tolerance good   Post-Treatment   Post-treatment Heart Rate (beats/min) 65   Post-treatment Resp Rate (breaths/min) 16   All Fields Breath Sounds unchanged   Duoneb mask tx Q4, NC 2L per home O2 regimen, vitals as charted.

## 2017-02-16 NOTE — PLAN OF CARE
Problem: Physical Therapy Goal  Goal: Physical Therapy Goal  Goals to be met by: 17     Patient will increase functional independence with mobility by performin. Gait x 200 feet with Stand-by Assistance using Rolling Walker.   2. Lower extremity exercise program x20 reps with assistance as needed   Outcome: Ongoing (interventions implemented as appropriate)  Ambulated 120' with rw and SBA, with O2 attached throughout.

## 2017-02-16 NOTE — PT/OT/SLP PROGRESS
Physical Therapy  Treatment    Nelly Tian   MRN: [id number]   Admitting Diagnosis: <principal problem not specified>    PT Received On: 17  PT Start Time: 1050     PT Stop Time: 1110    PT Total Time (min): 20 min       Billable Minutes:  Gait [id number] and Therapeutic Activity 10min    Treatment Type: Treatment  PT/PTA: PTA     PTA Visit Number: 1       General Precautions: Standard, fall, diabetic  Orthopedic Precautions: N/A   Braces:      Do you have any cultural, spiritual, Moravian conflicts, given your current situation?: none    Subjective:  Communicated with nurse Welsh prior to session.  Agreed to ambulate, requested to use commode first.    Pain Ratin/10                   Objective:   Patient found with: oxygen, telemetry    Functional Mobility:  Bed Mobility:        Transfers:  Sit <> Stand Assistance: Modified Independent  Sit <> Stand Assistive Device: Rolling Walker  Toilet Transfer Technique: Stand Pivot  Toilet Transfer Assistance: Stand By Assistance  Toilet Transfer Assistive Device: No Assistive Device    Gait:   Gait Distance: 120'  Assistance 1: Stand by Assistance  Gait Assistive Device: Rolling walker  Gait Pattern: 3-point gait  Gait Deviation(s): decreased madhav, decreased velocity of limb motion, decreased step length, decreased weight-shifting ability    Stairs:      Balance:   Static Sit: GOOD: Takes MODERATE challenges from all directions  Dynamic Sit: GOOD: Maintains balance through MODERATE excursions of active trunk movement  Static Stand: GOOD: Takes MODERATE challenges from all directions  Dynamic stand: GOOD: Needs SUPERVISION only during gait and able to self right with moderate      Therapeutic Activities and Exercises:  Seated EOB. Reported feeling a little sleepy. Transferred on / off commode with SBA . Stood and donned additional gown.  Ambulated 120' with rw and SBA with O2 attached.  Returned to sit EOB.      AM-PAC 6 CLICK MOBILITY  How much help  from another person does this patient currently need?   1 = Unable, Total/Dependent Assistance  2 = A lot, Maximum/Moderate Assistance  3 = A little, Minimum/Contact Guard/Supervision  4 = None, Modified [place]/Independent         AM-PAC Raw Score CMS G-Code Modifier Level of Impairment Assistance   6 % Total / Unable   7 - 9 CM 80 - 100% Maximal Assist   10 - 14 CL 60 - 80% Moderate Assist   15 - 19 CK 40 - 60% Moderate Assist   20 - 22 CJ 20 - 40% Minimal Assist   23 CI 1-20% SBA / CGA   24 CH 0% Independent/ Mod I     Patient left seated EOB with all lines intact, call button in reach, nurse Blaise notified and Marybel PEREZ present.    Assessment:  Nelly Tian is a 66 y.o. female with a medical diagnosis of <principal problem not specified> and presents with weakness, SOB, limited endurance.    Rehab identified problem list/impairments: Rehab identified problem list/impairments: weakness, impaired endurance, impaired balance    Rehab potential is good.    Activity tolerance: Fair    Discharge recommendations: Discharge Facility/Level Of Care Needs: home health PT     Barriers to discharge:      Equipment recommendations: Equipment Needed After Discharge: none     GOALS:   Physical Therapy Goals        Problem: Physical Therapy Goal    Goal Priority Disciplines Outcome Goal Variances Interventions   Physical Therapy Goal     PT/OT, PT Ongoing (interventions implemented as appropriate)     Description:  Goals to be met by: 17     Patient will increase functional independence with mobility by performin. Gait  x 200 feet with Stand-by Assistance using Rolling Walker.   2. Lower extremity exercise program x20 reps with assistance as needed                PLAN:    Patient to be seen 6 x/week  to address the above listed problems via gait training, therapeutic activities, therapeutic exercises  Plan of Care expires: 17  Plan of Care reviewed with: patient         Prema Sher,  PTA  02/16/2017

## 2017-02-17 ENCOUNTER — OUTPATIENT CASE MANAGEMENT (OUTPATIENT)
Dept: ADMINISTRATIVE | Facility: OTHER | Age: 67
End: 2017-02-17

## 2017-02-17 LAB
ANION GAP SERPL CALC-SCNC: 11 MMOL/L
ANION GAP SERPL CALC-SCNC: 8 MMOL/L
BUN SERPL-MCNC: 10 MG/DL
BUN SERPL-MCNC: 13 MG/DL
CALCIUM SERPL-MCNC: 9 MG/DL
CALCIUM SERPL-MCNC: 9.1 MG/DL
CHLORIDE SERPL-SCNC: 95 MMOL/L
CHLORIDE SERPL-SCNC: 99 MMOL/L
CO2 SERPL-SCNC: 35 MMOL/L
CO2 SERPL-SCNC: 39 MMOL/L
CREAT SERPL-MCNC: 0.7 MG/DL
CREAT SERPL-MCNC: 0.7 MG/DL
EST. GFR  (AFRICAN AMERICAN): >60 ML/MIN/1.73 M^2
EST. GFR  (AFRICAN AMERICAN): >60 ML/MIN/1.73 M^2
EST. GFR  (NON AFRICAN AMERICAN): >60 ML/MIN/1.73 M^2
EST. GFR  (NON AFRICAN AMERICAN): >60 ML/MIN/1.73 M^2
GLUCOSE SERPL-MCNC: 112 MG/DL
GLUCOSE SERPL-MCNC: 95 MG/DL
INR PPP: 2
MAGNESIUM SERPL-MCNC: 2 MG/DL
POCT GLUCOSE: 75 MG/DL (ref 70–110)
POCT GLUCOSE: 95 MG/DL (ref 70–110)
POTASSIUM SERPL-SCNC: 3.7 MMOL/L
POTASSIUM SERPL-SCNC: 4.1 MMOL/L
PROTHROMBIN TIME: 20.7 SEC
SODIUM SERPL-SCNC: 142 MMOL/L
SODIUM SERPL-SCNC: 145 MMOL/L

## 2017-02-17 PROCEDURE — 97116 GAIT TRAINING THERAPY: CPT

## 2017-02-17 PROCEDURE — 94640 AIRWAY INHALATION TREATMENT: CPT

## 2017-02-17 PROCEDURE — 27000221 HC OXYGEN, UP TO 24 HOURS

## 2017-02-17 PROCEDURE — 85610 PROTHROMBIN TIME: CPT

## 2017-02-17 PROCEDURE — 25000242 PHARM REV CODE 250 ALT 637 W/ HCPCS: Performed by: HOSPITALIST

## 2017-02-17 PROCEDURE — 25000003 PHARM REV CODE 250: Performed by: NURSE PRACTITIONER

## 2017-02-17 PROCEDURE — 63600175 PHARM REV CODE 636 W HCPCS: Performed by: NURSE PRACTITIONER

## 2017-02-17 PROCEDURE — 36415 COLL VENOUS BLD VENIPUNCTURE: CPT

## 2017-02-17 PROCEDURE — 12000002 HC ACUTE/MED SURGE SEMI-PRIVATE ROOM

## 2017-02-17 PROCEDURE — 97165 OT EVAL LOW COMPLEX 30 MIN: CPT

## 2017-02-17 PROCEDURE — 94761 N-INVAS EAR/PLS OXIMETRY MLT: CPT

## 2017-02-17 PROCEDURE — 25000003 PHARM REV CODE 250: Performed by: PHYSICIAN ASSISTANT

## 2017-02-17 PROCEDURE — 80048 BASIC METABOLIC PNL TOTAL CA: CPT

## 2017-02-17 PROCEDURE — 25000003 PHARM REV CODE 250: Performed by: HOSPITALIST

## 2017-02-17 PROCEDURE — 83735 ASSAY OF MAGNESIUM: CPT

## 2017-02-17 RX ORDER — MICONAZOLE NITRATE 2 %
POWDER (GRAM) TOPICAL 2 TIMES DAILY
Status: DISCONTINUED | OUTPATIENT
Start: 2017-02-17 | End: 2017-02-19 | Stop reason: HOSPADM

## 2017-02-17 RX ORDER — POTASSIUM CHLORIDE 20 MEQ/1
20 TABLET, EXTENDED RELEASE ORAL DAILY
Status: DISCONTINUED | OUTPATIENT
Start: 2017-02-17 | End: 2017-02-19 | Stop reason: HOSPADM

## 2017-02-17 RX ORDER — FUROSEMIDE 10 MG/ML
40 INJECTION INTRAMUSCULAR; INTRAVENOUS 3 TIMES DAILY
Status: DISCONTINUED | OUTPATIENT
Start: 2017-02-17 | End: 2017-02-18

## 2017-02-17 RX ADMIN — HYDROCODONE BITARTRATE AND ACETAMINOPHEN 1 TABLET: 10; 325 TABLET ORAL at 08:02

## 2017-02-17 RX ADMIN — FUROSEMIDE 40 MG: 10 INJECTION, SOLUTION INTRAMUSCULAR; INTRAVENOUS at 08:02

## 2017-02-17 RX ADMIN — MIRTAZAPINE 15 MG: 15 TABLET, FILM COATED ORAL at 08:02

## 2017-02-17 RX ADMIN — FERROUS SULFATE TAB EC 325 MG (65 MG FE EQUIVALENT) 325 MG: 325 (65 FE) TABLET DELAYED RESPONSE at 05:02

## 2017-02-17 RX ADMIN — IPRATROPIUM BROMIDE AND ALBUTEROL SULFATE 3 ML: .5; 3 SOLUTION RESPIRATORY (INHALATION) at 11:02

## 2017-02-17 RX ADMIN — METOPROLOL SUCCINATE 12.5 MG: 25 TABLET, EXTENDED RELEASE ORAL at 08:02

## 2017-02-17 RX ADMIN — ATORVASTATIN CALCIUM 20 MG: 20 TABLET, FILM COATED ORAL at 08:02

## 2017-02-17 RX ADMIN — LISINOPRIL 20 MG: 10 TABLET ORAL at 08:02

## 2017-02-17 RX ADMIN — POTASSIUM CHLORIDE 20 MEQ: 1500 TABLET, EXTENDED RELEASE ORAL at 02:02

## 2017-02-17 RX ADMIN — FUROSEMIDE 40 MG: 10 INJECTION, SOLUTION INTRAMUSCULAR; INTRAVENOUS at 02:02

## 2017-02-17 RX ADMIN — WARFARIN SODIUM 5 MG: 5 TABLET ORAL at 05:02

## 2017-02-17 RX ADMIN — IPRATROPIUM BROMIDE AND ALBUTEROL SULFATE 3 ML: .5; 3 SOLUTION RESPIRATORY (INHALATION) at 04:02

## 2017-02-17 RX ADMIN — Medication: at 09:02

## 2017-02-17 RX ADMIN — CLONAZEPAM 1 MG: 1 TABLET ORAL at 08:02

## 2017-02-17 RX ADMIN — GABAPENTIN 600 MG: 300 CAPSULE ORAL at 08:02

## 2017-02-17 RX ADMIN — MAGNESIUM HYDROXIDE 2400 MG: 400 SUSPENSION ORAL at 08:02

## 2017-02-17 RX ADMIN — IPRATROPIUM BROMIDE AND ALBUTEROL SULFATE 3 ML: .5; 3 SOLUTION RESPIRATORY (INHALATION) at 07:02

## 2017-02-17 RX ADMIN — THERA TABS 1 TABLET: TAB at 08:02

## 2017-02-17 RX ADMIN — CLONAZEPAM 1 MG: 1 TABLET ORAL at 09:02

## 2017-02-17 RX ADMIN — OXYCODONE HYDROCHLORIDE AND ACETAMINOPHEN 500 MG: 500 TABLET ORAL at 08:02

## 2017-02-17 NOTE — PT/OT/SLP PROGRESS
Physical Therapy  Treatment    Nelly Tian   MRN: [id number]   Admitting Diagnosis: Acute on chronic diastolic congestive heart failure    PT Received On: 17  PT Start Time: 942     PT Stop Time: 50    PT Total Time (min): 8 min       Billable Minutes:  Gait [id number]    Treatment Type: Treatment  PT/PTA: PTA     PTA Visit Number: 2       General Precautions: Standard, fall  Orthopedic Precautions: N/A   Braces:      Do you have any cultural, spiritual, Muslim conflicts, given your current situation?: none    Subjective:  Communicated with nurse Victoria prior to session.  Agreed to mobilize.    Pain Ratin/10                   Objective:   Patient found with: telemetry, oxygen    Functional Mobility:  Bed Mobility:        Transfers:  Sit <> Stand Assistance: Modified Independent  Sit <> Stand Assistive Device: Rolling Walker    Gait:   Gait Distance: 150'  Assistance 1: Stand by Assistance  Gait Assistive Device: Rolling walker  Gait Pattern: 3-point gait  Gait Deviation(s): decreased madhav, decreased step length, decreased weight-shifting ability    Stairs:      Balance:   Static Sit: GOOD: Takes MODERATE challenges from all directions  Dynamic Sit: GOOD: Maintains balance through MODERATE excursions of active trunk movement  Static Stand: GOOD: Takes MODERATE challenges from all directions  Dynamic stand: GOOD: Needs SUPERVISION only during gait and able to self right with moderate      Therapeutic Activities and Exercises:  Standing at bedside , following O.T. Ambulated 150' with rw and CGA with O2 attached.     AM-PAC 6 CLICK MOBILITY  How much help from another person does this patient currently need?   1 = Unable, Total/Dependent Assistance  2 = A lot, Maximum/Moderate Assistance  3 = A little, Minimum/Contact Guard/Supervision  4 = None, Modified [place]/Independent         AM-PAC Raw Score CMS G-Code Modifier Level of Impairment Assistance   6 % Total / Unable   7 - 9 CM 80  - 100% Maximal Assist   10 - 14 CL 60 - 80% Moderate Assist   15 - 19 CK 40 - 60% Moderate Assist   20 - 22 CJ 20 - 40% Minimal Assist   23 CI 1-20% SBA / CGA   24 CH 0% Independent/ Mod I     Patient left seated EOB with all lines intact, call button in reach and nurse Victoria notified.    Assessment:  Nelly Tian is a 66 y.o. female with a medical diagnosis of Acute on chronic diastolic congestive heart failure and presents with weakness, limited endurance.    Rehab identified problem list/impairments: Rehab identified problem list/impairments: impaired endurance    Rehab potential is fair.    Activity tolerance: Fair    Discharge recommendations: Discharge Facility/Level Of Care Needs: home health PT     Barriers to discharge: Barriers to Discharge: None    Equipment recommendations: Equipment Needed After Discharge: none     GOALS:   Physical Therapy Goals        Problem: Physical Therapy Goal    Goal Priority Disciplines Outcome Goal Variances Interventions   Physical Therapy Goal     PT/OT, PT Ongoing (interventions implemented as appropriate)     Description:  Goals to be met by: 17     Patient will increase functional independence with mobility by performin. Gait  x 200 feet with Stand-by Assistance using Rolling Walker.   2. Lower extremity exercise program x20 reps with assistance as needed                PLAN:    Patient to be seen 6 x/week  to address the above listed problems via gait training, therapeutic activities, therapeutic exercises  Plan of Care expires: 17  Plan of Care reviewed with: patient         Prema Sher, PTA  2017

## 2017-02-17 NOTE — TELEPHONE ENCOUNTER
She has to be monitor for PT for strengthening,chronic hypercapnea,frequent hypoglycemia attacks, and chronic malnutrition, apnea due to opiates and polypharmacy.

## 2017-02-17 NOTE — SUBJECTIVE & OBJECTIVE
Interval History:  Pt still SOB above baseline. Slowly improving. Discussed with Dr. Aguirre- increase IV lasix to tid.      Review of Systems   Constitutional: Positive for activity change and fatigue. Negative for appetite change, diaphoresis and fever.   HENT: Negative for facial swelling and sore throat.    Eyes: Negative for pain and redness.   Respiratory: Positive for cough and shortness of breath. Negative for apnea, choking, chest tightness, wheezing and stridor.    Cardiovascular: Positive for leg swelling. Negative for chest pain and palpitations.   Gastrointestinal: Negative for abdominal distention, abdominal pain, nausea and vomiting.   Endocrine: Negative for polydipsia and polyphagia.   Genitourinary: Negative for difficulty urinating, flank pain and hematuria.   Musculoskeletal: Positive for arthralgias, back pain and gait problem. Negative for neck pain and neck stiffness.   Skin: Negative for color change.   Allergic/Immunologic: Negative for food allergies.   Neurological: Positive for weakness and numbness (peripheral neuropathy ). Negative for syncope, facial asymmetry and speech difficulty.   Hematological: Does not bruise/bleed easily.   Psychiatric/Behavioral: Negative for agitation, behavioral problems, confusion, decreased concentration, dysphoric mood, hallucinations and suicidal ideas. The patient is not hyperactive.      Objective:     Vital Signs (Most Recent):  Temp: 98.7 °F (37.1 °C) (02/17/17 0730)  Pulse: 68 (02/17/17 0748)  Resp: 14 (02/17/17 0748)  BP: 134/63 (02/17/17 0730)  SpO2: 100 % (02/17/17 0748) Vital Signs (24h Range):  Temp:  [97.6 °F (36.4 °C)-98.7 °F (37.1 °C)] 98.7 °F (37.1 °C)  Pulse:  [59-73] 68  Resp:  [14-20] 14  SpO2:  [95 %-100 %] 100 %  BP: (108-134)/(46-63) 134/63     Weight: (!) 157.5 kg (347 lb 3.6 oz)  Body mass index is 67.81 kg/(m^2).    Intake/Output Summary (Last 24 hours) at 02/17/17 1143  Last data filed at 02/17/17 0908   Gross per 24 hour   Intake              2460 ml   Output             5650 ml   Net            -3190 ml      Physical Exam   Constitutional: She is oriented to person, place, and time. She appears well-developed and well-nourished.   HENT:   Head: Normocephalic and atraumatic.   Mouth/Throat: Oropharynx is clear and moist.   Eyes: Conjunctivae and EOM are normal. Pupils are equal, round, and reactive to light.   Neck: Normal range of motion. Neck supple. No thyromegaly present.   Cardiovascular: Normal rate, regular rhythm, normal heart sounds and intact distal pulses.    + 1 peripheral edema with chronic skin changes    Pulmonary/Chest: Effort normal. She has no wheezes. She has rales (bibasalar crackles).   Abdominal: Soft. Bowel sounds are normal. She exhibits no distension. There is no tenderness.   Musculoskeletal: Normal range of motion.   Neurological: She is alert and oriented to person, place, and time.   Decreased sensation to bilateral feet   Skin: Skin is warm and dry.   Psychiatric: She has a normal mood and affect. Her behavior is normal. Judgment normal.   Nursing note and vitals reviewed.      Significant Labs: Reviewed

## 2017-02-17 NOTE — PROGRESS NOTES
02/17/17-Called and spoke to JUANITA Mcclellan IPCM at the hospital. Patient to discharge home possibly tomorrow. OPCM will follow up with patient next week.

## 2017-02-17 NOTE — PT/OT/SLP EVAL
Occupational Therapy  Evaluation    Nelly Tian   MRN: [id number]   Admitting Diagnosis: Acute on chronic diastolic congestive heart failure    OT Date of Treatment: 17   OT Start Time: 930  OT Stop Time: 942  OT Total Time (min): 12 min    Billable Minutes:  Evaluation 12    Diagnosis: Acute on chronic diastolic congestive heart failure     Past Medical History   Diagnosis Date    *Atrial flutter     Anxiety     Arthritis     Asthma     Atrial fibrillation     Back pain     Cataract      OD    CHF (congestive heart failure)     COPD (chronic obstructive pulmonary disease)     Depression     Diabetes mellitus     Emphysema of lung     Heart failure     Hernia     Hypercapnic respiratory failure, chronic 2016    Hyperlipidemia     Hypertension     Iron deficiency anemia 2/3/2016    Myocardial infarction     Obesity     Peripheral vascular disease     Pneumonia     Polyneuropathy     Retinal detachment      OS    Tobacco dependence     Type II 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, not stated as uncontrolled       Past Surgical History   Procedure Laterality Date     section       x2    Oophorectomy       one ovary conserved    Retinal detachment surgery       buckle --OS    Cataract extraction Left      OS     Eye surgery      Hysterectomy         Referring physician: Dr. Aguirre  Date referred to OT: 2017    General Precautions: Standard, fall, diabetic  Orthopedic Precautions: N/A  Braces: N/A    Do you have any cultural, spiritual, [employer] conflicts, given your current situation?: none noted     Patient History:  Living Environment  Lives With: child(bonny), adult, grandchild(bonny)  Living Arrangements: mobile home  Home Accessibility:  (ramp)  Home Layout: Able to live on 1st floor (tub/shower combo)  Transportation Available: family or friend will provide  Equipment Currently Used at Home: rollator, cane, straight,  wheelchair, power chair, oxygen    Prior level of function:   Bed Mobility/Transfers: needs device  Grooming: independent  Bathing: independent (Pt reports she stands in shower w/ mod I for bathing)  Upper Body Dressing: independent  Lower Body Dressing: needs assist (for R sock and threading R LE if she wears pants)  Toileting: independent  Home Management Skills:  (indep for laundry per pt report)      Dominant hand: right    Subjective:  Communicated with nsg (Victoria) prior to session.    Chief Complaint: pt ready to go home. She states she does not want therapy at home  Patient/Family stated goals: home    Pain Ratin/10   Pain Rating Post-Intervention: 0/10    Objective:  Patient found with: oxygen, telemetry, peripheral IV    Cognitive Exam:  Oriented to: Person, Place, Time and Situation  Follows Commands/attention: Follows one-step commands  Communication: clear/fluent  Memory:  No Deficits noted  Safety awareness/insight to disability: intact for immediate safety concerns  Coping skills/emotional control: Appropriate to situation    Visual/perceptual: no concerns noted by pt    Physical Exam:  Postural examination/scapula alignment: Rounded shoulder  Skin integrity: Visible skin intact     Sensation: prosper UE light touch grossly intact    Upper Extremity Range of Motion:  Right Upper Extremity: min decreased shoulder, distally WFL  Left Upper Extremity: min decreased shoulder, distally WFL    Upper Extremity Strength:  Right Upper Extremity: grossly 4/5  Left Upper Extremity: grossly 4/5   Strength: WFL    Fine motor coordination: prosper UE WFL for ADLs    Gross motor coordination: WFL    Functional Mobility:  Bed Mobility:  Supine to Sit: Modified Independent (with HOB elevated)    Transfers:  Sit <> Stand Assistance: Modified Independent  Sit <> Stand Assistive Device: No Assistive Device  Toilet Transfer Technique: Stand Pivot  Toilet Transfer Assistance: Modified Independent  Toilet Transfer Assistive  "Device: bedside commode    Activities of Daily Living:  Feeding Level of Assistance: Independent  LE Dressing Level of Assistance: Moderate assistance (mod I to thread sock on L LE; total A for R LE - pt reports her daughter assists with this at home and pt's plan is to continue. She is not interested in AE for LBD)  Toileting Where Assessed: Bedside commode  Toileting Level of Assistance: Modified independent (for urination only)     Balance:   Static Sit: GOOD: Takes MODERATE challenges from all directions  Dynamic Sit: GOOD: Maintains balance through MODERATE excursions of active trunk movement  Static Stand: See PT  Dynamic stand: See PT    AM-PAC 6 CLICK ADL  How much help from another person does this patient currently need?  1 = Unable, Total/Dependent Assistance  2 = A lot, Maximum/Moderate Assistance  3 = A little, Minimum/Contact Guard/Supervision  4 = None, Modified [place]/Independent    Putting on and taking off regular lower body clothing? : 2  Bathing (including washing, rinsing, drying)?: 3  Toileting, which includes using toilet, bedpan, or urinal? : 4  Putting on and taking off regular upper body clothing?: 4  Taking care of personal grooming such as brushing teeth?: 4  Eating meals?: 4  Total Score: 21    AM-PAC Raw Score CMS "G-Code Modifier Level of Impairment Assistance   6 % Total / Unable   7 - 9 CM 80 - 100% Maximal Assist   10 - 14 CL 60 - 80% Moderate Assist   15 - 19 CK 40 - 60% Moderate Assist   20 - 22 CJ 20 - 40% Minimal Assist   23 CI 1-20% SBA / CGA   24 CH 0% Independent/ Mod I     Patient left with PTA.     Assessment:  Nelly Tian is a 66 y.o. female with a medical diagnosis of Acute on chronic diastolic congestive heart failure. She required a low complexity OT evaluation with expanded review of medical hsitory and occupational profile. Deficits were primarily in decreased activity tolerance. Pt required low analytical complexity due to occupational profile " factors. Assessment required problem-focused review. Skilled OT is not recommended at this time.  Will defer to PT to address activity tolerance.  PT is recommended.    Rehab identified problem list/impairments: Rehab identified problem list/impairments: impaired endurance    Activity tolerance: Poor    Discharge recommendations: Discharge Facility/Level Of Care Needs: home health PT     Barriers to discharge: Barriers to Discharge: None    Equipment recommendations: none (recommend shower chair/ TTB. OT discussed with pt. )     GOALS:   Occupational Therapy Goals     Not on file          PLAN: D/C skilled OT and defer to PT to address activity tolerance deficits.    Carri Tolbert OT  02/17/2017

## 2017-02-17 NOTE — PLAN OF CARE
Problem: Physical Therapy Goal  Goal: Physical Therapy Goal  Goals to be met by: 17     Patient will increase functional independence with mobility by performin. Gait x 200 feet with Stand-by Assistance using Rolling Walker.   2. Lower extremity exercise program x20 reps with assistance as needed   Outcome: Ongoing (interventions implemented as appropriate)  Ambulated 150' with rw and SBA with O2 attached.

## 2017-02-17 NOTE — ASSESSMENT & PLAN NOTE
Trend I&  O.  Trend renal function. Echo - EF 50-55 %  Cardiology consulted  Increase IV  lasix to tid

## 2017-02-17 NOTE — ASSESSMENT & PLAN NOTE
Chronic. Resume home antihypertensives. accuchecks Ac and HS. Insulin correction scale  Blood sugars

## 2017-02-17 NOTE — ASSESSMENT & PLAN NOTE
Initiated on Lasix 40 mg IV BID. Trend I&  O.  Trend renal function. Echo pending.   Continue IV lasix, patient diuresing well. Cardiology consult pending

## 2017-02-17 NOTE — PLAN OF CARE
Problem: Patient Care Overview  Goal: Plan of Care Review  Outcome: Ongoing (interventions implemented as appropriate)  Pt AAOx4, Pt safe and free from falls. Pt cooperative, Pt complains of pain relieved by PRN meds, Pt denies N/V, pt up to bedside commode Pt on O2 NC. VS stable, BG monitored. Call light in reach, bed in low position, wheels locked.

## 2017-02-17 NOTE — PLAN OF CARE
02/16/17 1935   Patient Assessment/Suction   All Lung Fields Breath Sounds coarse   Aerosol Therapy   $ Aerosol Therapy Charges Aerosol Treatment   Respiratory Treatment Status given   Position During Treatment HOB at 30 degrees   Patient Tolerance good   Post-Treatment   Post-treatment Heart Rate (beats/min) 65   Post-treatment Resp Rate (breaths/min) 16   All Fields Breath Sounds unchanged

## 2017-02-17 NOTE — PROGRESS NOTES
[facility] Medicine  Progress Note    Patient Name: Nelly Tian  MRN: [id number]  Patient Class: IP- Inpatient   Admission Date: 2/14/2017  Length of Stay: 2 days  Attending Physician: Dion Aguirre MD  Primary Care Provider: Constantine Bird MD        Subjective:     Principal Problem:<principal problem not specified>    HPI:  Nelly Tian is 66 year old female with PMH of atrial fibrillation, COPD, diastolic heart failure, hypertension, morbid obesity, chronic respiratory failure on home oxygen, and PVD who presents to the ED for evaluation of worsening of chronic dyspnea for ~1-2 weeks associated with increased peripheral edema.  Patient states she took an extra dose of Lasix 2 days without any improvement of shortness of breath. She was evaluated by Home amy nurse this am and recommended to go the ED for evaluation. Denies chest pain. Pt found to have CHF and admitted for further evaluation and treatment.    Hospital Course:  Initiated on IV lasix and Respiratory treatments. Echo completed. Cardiology consulted. Pt reports feeling better.    Interval History: Still sob but reports feeling better    Review of Systems   Constitutional: Positive for activity change and fatigue. Negative for appetite change, diaphoresis and fever.   HENT: Negative for facial swelling and sore throat.    Eyes: Negative for pain and redness.   Respiratory: Positive for cough, shortness of breath and wheezing. Negative for apnea, choking, chest tightness and stridor.    Cardiovascular: Positive for leg swelling. Negative for chest pain and palpitations.   Gastrointestinal: Negative for abdominal distention, abdominal pain, nausea and vomiting.   Endocrine: Negative for polydipsia and polyphagia.   Genitourinary: Negative for difficulty urinating, flank pain and hematuria.   Musculoskeletal: Positive for arthralgias, back pain and gait problem. Negative for neck pain and neck stiffness.   Skin:  Negative for color change.   Allergic/Immunologic: Negative for food allergies.   Neurological: Positive for weakness and numbness (peripheral neuropathy ). Negative for syncope, facial asymmetry and speech difficulty.   Hematological: Does not bruise/bleed easily.   Psychiatric/Behavioral: Negative for agitation, behavioral problems, confusion, decreased concentration, dysphoric mood, hallucinations and suicidal ideas. The patient is not hyperactive.      Objective:     Vital Signs (Most Recent):  Temp: 97.6 °F (36.4 °C) (02/16/17 1222)  Pulse: 64 (02/16/17 1544)  Resp: 16 (02/16/17 1544)  BP: (!) 124/58 (02/16/17 1222)  SpO2: 95 % (02/16/17 1544) Vital Signs (24h Range):  Temp:  [97.5 °F (36.4 °C)-97.9 °F (36.6 °C)] 97.6 °F (36.4 °C)  Pulse:  [57-96] 64  Resp:  [15-20] 16  SpO2:  [94 %-96 %] 95 %  BP: (120-129)/(55-59) 124/58     Weight: (!) 158 kg (348 lb 6.4 oz)  Body mass index is 68.04 kg/(m^2).    Intake/Output Summary (Last 24 hours) at 02/16/17 1921  Last data filed at 02/16/17 1653   Gross per 24 hour   Intake              960 ml   Output             4000 ml   Net            -3040 ml      Physical Exam   Constitutional: She is oriented to person, place, and time. She appears well-developed and well-nourished.   HENT:   Head: Normocephalic and atraumatic.   Mouth/Throat: Oropharynx is clear and moist.   Eyes: Conjunctivae and EOM are normal. Pupils are equal, round, and reactive to light.   Neck: Normal range of motion. Neck supple. No thyromegaly present.   Cardiovascular: Normal rate, regular rhythm, normal heart sounds and intact distal pulses.    + 1 peripheral edema with chronic skin changes    Pulmonary/Chest: Effort normal. She has wheezes. She has rales (bibasalar crackles).   Abdominal: Soft. Bowel sounds are normal. She exhibits no distension. There is no tenderness.   Musculoskeletal: Normal range of motion.   Neurological: She is alert and oriented to person, place, and time.   Decreased  sensation to bilateral feet   Skin: Skin is warm and dry.   Psychiatric: She has a normal mood and affect. Her behavior is normal. Judgment normal.   Nursing note and vitals reviewed.      Significant Labs: Reviewed         Assessment/Plan:      Diabetes mellitus with neuropathy  Chronic. Resume gabapentin    Chronic obstructive pulmonary disease with acute exacerbation  Duo nebs scheduled. Home bronchodilators.       Tobacco dependency  Educated on smoking cessation      Chronic atrial fibrillation  Resume beta blocker and warfarin  INR today at 2.0      Hypertension associated with diabetes  Chronic. Resume home antihypertensives. accuchecks Ac and HS. Insulin correction scale      Morbid obesity with BMI of 45.0-49.9, adult  Low fat diet. Consult dietary for education    PVD (peripheral vascular disease)  Chronic. Noted on Arterial US 11/2016      Abdominal aortic atherosclerosis  Chronic.  Resume statin      Iron deficiency anemia  Add MVI, vitamin C, and ferrous sulfate      Acute on chronic respiratory failure with hypoxemia  With hypoxemia. Resume home oxygen 2 Liters. Monitor oxygen saturation      Acute on chronic diastolic congestive heart failure  Initiated on Lasix 40 mg IV BID. Trend I&  O.  Trend renal function. Echo pending.   Continue IV lasix, patient diuresing well. Cardiology consult pending      Moderate episode of recurrent major depressive disorder  Chronic. Resume lexapro and trazodone      VTE Risk Mitigation     None          DARIEN Bobo  Department of Hospital Medicine   [facility]    Time spent seeing patient( greater than 1/2 spent in direct contact) : 28 minutes

## 2017-02-17 NOTE — PLAN OF CARE
Problem: Patient Care Overview  Goal: Individualization & Mutuality     OT eval complete. Pt has no immediate need for skilled OT, states she is able to her necessary tasks without significant difficulty and has stated that she does not want HH therapy after d/c. She does have obvious deficits in activity tolerance and OT will defer to PT at this time to address that concern. Please see eval for details. Thank you for consult.

## 2017-02-17 NOTE — PLAN OF CARE
Problem: Patient Care Overview  Goal: Plan of Care Review  Outcome: Ongoing (interventions implemented as appropriate)  nick aerosol tx well bbs decreased O2 on @ 2L

## 2017-02-17 NOTE — PLAN OF CARE
Problem: Patient Care Overview  Goal: Plan of Care Review  Outcome: Ongoing (interventions implemented as appropriate)  Pt lying in bed, respiration even and unlabored, no acute distress noted.  Denies pain and discomfort at this time.  She has been treated with lasix twice today, ambulates to and from bedside commode.  Side rails up times two, bed low and locked, call light within reach.

## 2017-02-17 NOTE — SUBJECTIVE & OBJECTIVE
Interval History: Still sob but reports feeling better    Review of Systems   Constitutional: Positive for activity change and fatigue. Negative for appetite change, diaphoresis and fever.   HENT: Negative for facial swelling and sore throat.    Eyes: Negative for pain and redness.   Respiratory: Positive for cough, shortness of breath and wheezing. Negative for apnea, choking, chest tightness and stridor.    Cardiovascular: Positive for leg swelling. Negative for chest pain and palpitations.   Gastrointestinal: Negative for abdominal distention, abdominal pain, nausea and vomiting.   Endocrine: Negative for polydipsia and polyphagia.   Genitourinary: Negative for difficulty urinating, flank pain and hematuria.   Musculoskeletal: Positive for arthralgias, back pain and gait problem. Negative for neck pain and neck stiffness.   Skin: Negative for color change.   Allergic/Immunologic: Negative for food allergies.   Neurological: Positive for weakness and numbness (peripheral neuropathy ). Negative for syncope, facial asymmetry and speech difficulty.   Hematological: Does not bruise/bleed easily.   Psychiatric/Behavioral: Negative for agitation, behavioral problems, confusion, decreased concentration, dysphoric mood, hallucinations and suicidal ideas. The patient is not hyperactive.      Objective:     Vital Signs (Most Recent):  Temp: 97.6 °F (36.4 °C) (02/16/17 1222)  Pulse: 64 (02/16/17 1544)  Resp: 16 (02/16/17 1544)  BP: (!) 124/58 (02/16/17 1222)  SpO2: 95 % (02/16/17 1544) Vital Signs (24h Range):  Temp:  [97.5 °F (36.4 °C)-97.9 °F (36.6 °C)] 97.6 °F (36.4 °C)  Pulse:  [57-96] 64  Resp:  [15-20] 16  SpO2:  [94 %-96 %] 95 %  BP: (120-129)/(55-59) 124/58     Weight: (!) 158 kg (348 lb 6.4 oz)  Body mass index is 68.04 kg/(m^2).    Intake/Output Summary (Last 24 hours) at 02/16/17 1921  Last data filed at 02/16/17 1653   Gross per 24 hour   Intake              960 ml   Output             4000 ml   Net             -3040 ml      Physical Exam   Constitutional: She is oriented to person, place, and time. She appears well-developed and well-nourished.   HENT:   Head: Normocephalic and atraumatic.   Mouth/Throat: Oropharynx is clear and moist.   Eyes: Conjunctivae and EOM are normal. Pupils are equal, round, and reactive to light.   Neck: Normal range of motion. Neck supple. No thyromegaly present.   Cardiovascular: Normal rate, regular rhythm, normal heart sounds and intact distal pulses.    + 1 peripheral edema with chronic skin changes    Pulmonary/Chest: Effort normal. She has wheezes. She has rales (bibasalar crackles).   Abdominal: Soft. Bowel sounds are normal. She exhibits no distension. There is no tenderness.   Musculoskeletal: Normal range of motion.   Neurological: She is alert and oriented to person, place, and time.   Decreased sensation to bilateral feet   Skin: Skin is warm and dry.   Psychiatric: She has a normal mood and affect. Her behavior is normal. Judgment normal.   Nursing note and vitals reviewed.      Significant Labs: Reviewed

## 2017-02-17 NOTE — PROGRESS NOTES
[facility] Medicine  Progress Note    Patient Name: Nelly Tian  MRN: [id number]  Patient Class: IP- Inpatient   Admission Date: 2/14/2017  Length of Stay: 3 days  Attending Physician: Dion Aguirre MD  Primary Care Provider: Constantine Bird MD        Subjective:     Principal Problem:Acute on chronic diastolic congestive heart failure    HPI:  Nelly Tian is 66 year old female with PMH of atrial fibrillation, COPD, diastolic heart failure, hypertension, morbid obesity, chronic respiratory failure on home oxygen, and PVD who presents to the ED for evaluation of worsening of chronic dyspnea for ~1-2 weeks associated with increased peripheral edema.  Patient states she took an extra dose of Lasix 2 days without any improvement of shortness of breath. She was evaluated by Home amy nurse this am and recommended to go the ED for evaluation. Denies chest pain. Pt found to have CHF and admitted for further evaluation and treatment.    Hospital Course:  Initiated on IV lasix and Respiratory treatments. Echo completed. Cardiology consulted. Pt reports feeling better but still with SOB above baseline. Continue diuresing. PT/OT consulted. Plan [facility] on discharge    Interval History:  Pt still SOB above baseline. Slowly improving. Discussed with Dr. Aguirre- increase IV lasix to tid.      Review of Systems   Constitutional: Positive for activity change and fatigue. Negative for appetite change, diaphoresis and fever.   HENT: Negative for facial swelling and sore throat.    Eyes: Negative for pain and redness.   Respiratory: Positive for cough and shortness of breath. Negative for apnea, choking, chest tightness, wheezing and stridor.    Cardiovascular: Positive for leg swelling. Negative for chest pain and palpitations.   Gastrointestinal: Negative for abdominal distention, abdominal pain, nausea and vomiting.   Endocrine: Negative for polydipsia and polyphagia.   Genitourinary:  Negative for difficulty urinating, flank pain and hematuria.   Musculoskeletal: Positive for arthralgias, back pain and gait problem. Negative for neck pain and neck stiffness.   Skin: Negative for color change.   Allergic/Immunologic: Negative for food allergies.   Neurological: Positive for weakness and numbness (peripheral neuropathy ). Negative for syncope, facial asymmetry and speech difficulty.   Hematological: Does not bruise/bleed easily.   Psychiatric/Behavioral: Negative for agitation, behavioral problems, confusion, decreased concentration, dysphoric mood, hallucinations and suicidal ideas. The patient is not hyperactive.      Objective:     Vital Signs (Most Recent):  Temp: 98.7 °F (37.1 °C) (02/17/17 0730)  Pulse: 68 (02/17/17 0748)  Resp: 14 (02/17/17 0748)  BP: 134/63 (02/17/17 0730)  SpO2: 100 % (02/17/17 0748) Vital Signs (24h Range):  Temp:  [97.6 °F (36.4 °C)-98.7 °F (37.1 °C)] 98.7 °F (37.1 °C)  Pulse:  [59-73] 68  Resp:  [14-20] 14  SpO2:  [95 %-100 %] 100 %  BP: (108-134)/(46-63) 134/63     Weight: (!) 157.5 kg (347 lb 3.6 oz)  Body mass index is 67.81 kg/(m^2).    Intake/Output Summary (Last 24 hours) at 02/17/17 1143  Last data filed at 02/17/17 0908   Gross per 24 hour   Intake             2460 ml   Output             5650 ml   Net            -3190 ml      Physical Exam   Constitutional: She is oriented to person, place, and time. She appears well-developed and well-nourished.   HENT:   Head: Normocephalic and atraumatic.   Mouth/Throat: Oropharynx is clear and moist.   Eyes: Conjunctivae and EOM are normal. Pupils are equal, round, and reactive to light.   Neck: Normal range of motion. Neck supple. No thyromegaly present.   Cardiovascular: Normal rate, regular rhythm, normal heart sounds and intact distal pulses.    + 1 peripheral edema with chronic skin changes    Pulmonary/Chest: Effort normal. She has no wheezes. She has rales (bibasalar crackles).   Abdominal: Soft. Bowel sounds are  normal. She exhibits no distension. There is no tenderness.   Musculoskeletal: Normal range of motion.   Neurological: She is alert and oriented to person, place, and time.   Decreased sensation to bilateral feet   Skin: Skin is warm and dry.   Psychiatric: She has a normal mood and affect. Her behavior is normal. Judgment normal.   Nursing note and vitals reviewed.      Significant Labs: Reviewed       Assessment/Plan:      * Acute on chronic diastolic congestive heart failure  Trend I&  O.  Trend renal function. Echo - EF 50-55 %  Cardiology consulted  Increase IV  lasix to tid      Diabetes mellitus with neuropathy  Chronic. Resume gabapentin    Chronic obstructive pulmonary disease with acute exacerbation  Duo nebs scheduled. Home bronchodilators.       Tobacco dependency  Educated on smoking cessation      Chronic atrial fibrillation  Continue home beta blocker and warfarin  INR today at 2.0      Hypertension associated with diabetes  Chronic. Resume home antihypertensives. accuchecks Ac and HS. Insulin correction scale  Blood sugars       Morbid obesity with BMI of 45.0-49.9, adult  Low fat diet. Consult dietary for education    PVD (peripheral vascular disease)  Chronic. Noted on Arterial US 11/2016      Abdominal aortic atherosclerosis  Chronic.  Resume statin      Iron deficiency anemia  Continue  MVI, vitamin C, and ferrous sulfate      Acute on chronic respiratory failure with hypoxemia  With hypoxemia. Resume home oxygen 2 Liters. Monitor oxygen saturation      Moderate episode of recurrent major depressive disorder  Chronic. Resume lexapro and trazodone      VTE Risk Mitigation     None          DARIEN Bobo  Department of Hospital Medicine   [facility]    Time spent seeing patient( greater than 1/2 spent in direct contact) : 38 minutes

## 2017-02-18 PROBLEM — B37.2 YEAST DERMATITIS: Status: ACTIVE | Noted: 2017-02-18

## 2017-02-18 LAB
ANION GAP SERPL CALC-SCNC: 10 MMOL/L
BUN SERPL-MCNC: 12 MG/DL
CALCIUM SERPL-MCNC: 9 MG/DL
CHLORIDE SERPL-SCNC: 95 MMOL/L
CO2 SERPL-SCNC: 38 MMOL/L
CREAT SERPL-MCNC: 0.6 MG/DL
EST. GFR  (AFRICAN AMERICAN): >60 ML/MIN/1.73 M^2
EST. GFR  (NON AFRICAN AMERICAN): >60 ML/MIN/1.73 M^2
GLUCOSE SERPL-MCNC: 103 MG/DL
INR PPP: 2
POCT GLUCOSE: 107 MG/DL (ref 70–110)
POCT GLUCOSE: 122 MG/DL (ref 70–110)
POCT GLUCOSE: 187 MG/DL (ref 70–110)
POTASSIUM SERPL-SCNC: 3.6 MMOL/L
PROTHROMBIN TIME: 20.9 SEC
SODIUM SERPL-SCNC: 143 MMOL/L

## 2017-02-18 PROCEDURE — 25000003 PHARM REV CODE 250: Performed by: HOSPITALIST

## 2017-02-18 PROCEDURE — 94761 N-INVAS EAR/PLS OXIMETRY MLT: CPT

## 2017-02-18 PROCEDURE — 25000003 PHARM REV CODE 250: Performed by: FAMILY MEDICINE

## 2017-02-18 PROCEDURE — 97116 GAIT TRAINING THERAPY: CPT

## 2017-02-18 PROCEDURE — 36415 COLL VENOUS BLD VENIPUNCTURE: CPT

## 2017-02-18 PROCEDURE — 85610 PROTHROMBIN TIME: CPT

## 2017-02-18 PROCEDURE — 80048 BASIC METABOLIC PNL TOTAL CA: CPT

## 2017-02-18 PROCEDURE — 25000003 PHARM REV CODE 250: Performed by: NURSE PRACTITIONER

## 2017-02-18 PROCEDURE — 12000002 HC ACUTE/MED SURGE SEMI-PRIVATE ROOM

## 2017-02-18 PROCEDURE — 94640 AIRWAY INHALATION TREATMENT: CPT

## 2017-02-18 PROCEDURE — 27000221 HC OXYGEN, UP TO 24 HOURS

## 2017-02-18 PROCEDURE — 25000003 PHARM REV CODE 250: Performed by: PHYSICIAN ASSISTANT

## 2017-02-18 PROCEDURE — 63600175 PHARM REV CODE 636 W HCPCS: Performed by: NURSE PRACTITIONER

## 2017-02-18 PROCEDURE — 25000242 PHARM REV CODE 250 ALT 637 W/ HCPCS: Performed by: HOSPITALIST

## 2017-02-18 RX ORDER — FUROSEMIDE 40 MG/1
40 TABLET ORAL 2 TIMES DAILY
Status: DISCONTINUED | OUTPATIENT
Start: 2017-02-18 | End: 2017-02-19 | Stop reason: HOSPADM

## 2017-02-18 RX ORDER — FLUCONAZOLE 50 MG/1
150 TABLET ORAL DAILY
Status: DISCONTINUED | OUTPATIENT
Start: 2017-02-18 | End: 2017-02-19 | Stop reason: HOSPADM

## 2017-02-18 RX ADMIN — ATORVASTATIN CALCIUM 20 MG: 20 TABLET, FILM COATED ORAL at 09:02

## 2017-02-18 RX ADMIN — LISINOPRIL 20 MG: 10 TABLET ORAL at 09:02

## 2017-02-18 RX ADMIN — IPRATROPIUM BROMIDE AND ALBUTEROL SULFATE 3 ML: .5; 3 SOLUTION RESPIRATORY (INHALATION) at 03:02

## 2017-02-18 RX ADMIN — FUROSEMIDE 40 MG: 10 INJECTION, SOLUTION INTRAMUSCULAR; INTRAVENOUS at 05:02

## 2017-02-18 RX ADMIN — HYDROCODONE BITARTRATE AND ACETAMINOPHEN 1 TABLET: 10; 325 TABLET ORAL at 09:02

## 2017-02-18 RX ADMIN — GABAPENTIN 600 MG: 300 CAPSULE ORAL at 08:02

## 2017-02-18 RX ADMIN — IPRATROPIUM BROMIDE AND ALBUTEROL SULFATE 3 ML: .5; 3 SOLUTION RESPIRATORY (INHALATION) at 11:02

## 2017-02-18 RX ADMIN — HYDROCODONE BITARTRATE AND ACETAMINOPHEN 1 TABLET: 10; 325 TABLET ORAL at 05:02

## 2017-02-18 RX ADMIN — FUROSEMIDE 40 MG: 40 TABLET ORAL at 05:02

## 2017-02-18 RX ADMIN — FUROSEMIDE 40 MG: 10 INJECTION, SOLUTION INTRAMUSCULAR; INTRAVENOUS at 02:02

## 2017-02-18 RX ADMIN — IPRATROPIUM BROMIDE AND ALBUTEROL SULFATE 3 ML: .5; 3 SOLUTION RESPIRATORY (INHALATION) at 07:02

## 2017-02-18 RX ADMIN — CLONAZEPAM 1 MG: 1 TABLET ORAL at 05:02

## 2017-02-18 RX ADMIN — THERA TABS 1 TABLET: TAB at 09:02

## 2017-02-18 RX ADMIN — OXYCODONE HYDROCHLORIDE AND ACETAMINOPHEN 500 MG: 500 TABLET ORAL at 08:02

## 2017-02-18 RX ADMIN — POTASSIUM CHLORIDE 20 MEQ: 1500 TABLET, EXTENDED RELEASE ORAL at 09:02

## 2017-02-18 RX ADMIN — METOPROLOL SUCCINATE 12.5 MG: 25 TABLET, EXTENDED RELEASE ORAL at 09:02

## 2017-02-18 RX ADMIN — WARFARIN SODIUM 5 MG: 5 TABLET ORAL at 05:02

## 2017-02-18 RX ADMIN — FLUCONAZOLE 150 MG: 50 TABLET ORAL at 03:02

## 2017-02-18 RX ADMIN — Medication: at 09:02

## 2017-02-18 RX ADMIN — FERROUS SULFATE TAB EC 325 MG (65 MG FE EQUIVALENT) 325 MG: 325 (65 FE) TABLET DELAYED RESPONSE at 05:02

## 2017-02-18 RX ADMIN — Medication: at 08:02

## 2017-02-18 RX ADMIN — GABAPENTIN 600 MG: 300 CAPSULE ORAL at 09:02

## 2017-02-18 NOTE — PLAN OF CARE
Problem: Patient Care Overview  Goal: Plan of Care Review  Outcome: Ongoing (interventions implemented as appropriate)  Patient remains on aerosol tx via duoneb now and q4hr w/a .  Hr 100  And 02 saturation 88% on nc at 2lpm.  Increased 02 to 3lpm.

## 2017-02-18 NOTE — PLAN OF CARE
Problem: Patient Care Overview  Goal: Plan of Care Review  Outcome: Ongoing (interventions implemented as appropriate)  Safety maintained No acute distress noted Remains on cardiac Monitoring No acute distress noted will continue to monitor

## 2017-02-18 NOTE — PLAN OF CARE
Problem: Patient Care Overview  Goal: Plan of Care Review  Outcome: Ongoing (interventions implemented as appropriate)  Pt sitting up in bed playing on pad, respirations even and unlabored, no acute distress noted.  She denies pain and discomfort at this time.  Lasix continued for diuresis, instructed on fluid restrictions.  She had chest x-ray today.  Side rails up times two, bed low and locked, call light within reach.

## 2017-02-18 NOTE — SUBJECTIVE & OBJECTIVE
Interval History:  Patient with improvement of dyspnea. Staff reports decreased ox sat to 88% with activity now on 3 L nasal cannula.  Patient with resolution of peripheral edema.     Daughter concerned about rash to left side of abd/skin fold.     Review of Systems   Constitutional: Positive for activity change and fatigue. Negative for appetite change, diaphoresis and fever.   HENT: Negative for facial swelling and sore throat.    Respiratory: Positive for cough and shortness of breath. Negative for apnea, choking, chest tightness, wheezing and stridor.    Cardiovascular: Positive for leg swelling. Negative for chest pain and palpitations.   Gastrointestinal: Negative for abdominal distention, abdominal pain, nausea and vomiting.   Musculoskeletal: Positive for arthralgias, back pain and gait problem. Negative for neck pain and neck stiffness.   Skin: Positive for rash (left abd skin fold). Negative for color change.   Neurological: Positive for weakness and numbness (peripheral neuropathy ). Negative for facial asymmetry.     Objective:     Vital Signs (Most Recent):  Temp: 98.8 °F (37.1 °C) (02/18/17 1138)  Pulse: 86 (02/18/17 1138)  Resp: 18 (02/18/17 1138)  BP: 127/62 (02/18/17 1138)  SpO2: 96 % (02/18/17 1138) Vital Signs (24h Range):  Temp:  [97.8 °F (36.6 °C)-98.8 °F (37.1 °C)] 98.8 °F (37.1 °C)  Pulse:  [] 86  Resp:  [16-20] 18  SpO2:  [88 %-98 %] 96 %  BP: (117-132)/(51-74) 127/62     Weight: (!) 157.5 kg (347 lb 3.6 oz)  Body mass index is 67.81 kg/(m^2).    Intake/Output Summary (Last 24 hours) at 02/18/17 1354  Last data filed at 02/18/17 0700   Gross per 24 hour   Intake              480 ml   Output             3800 ml   Net            -3320 ml      Physical Exam   Constitutional: She is oriented to person, place, and time. She appears well-developed and well-nourished.   HENT:   Head: Normocephalic and atraumatic.   Mouth/Throat: Oropharynx is clear and moist.   Eyes: Conjunctivae and EOM are  normal. Pupils are equal, round, and reactive to light.   Neck: Normal range of motion. Neck supple. No thyromegaly present.   Cardiovascular: Normal rate, regular rhythm, normal heart sounds and intact distal pulses.    + 1 peripheral edema with chronic skin changes    Pulmonary/Chest: Effort normal. She has no wheezes. She has rales (few crackles on the right. ).   Abdominal: Soft. Bowel sounds are normal. She exhibits no distension. There is no tenderness.   Musculoskeletal: Normal range of motion.   Neurological: She is alert and oriented to person, place, and time.   Decreased sensation to bilateral feet   Skin: Skin is warm and dry.   Erythema to left abdomen skin fold with small ulceration. + foul odor.  (yeast)   Psychiatric: She has a normal mood and affect. Her behavior is normal. Judgment normal.   Nursing note and vitals reviewed.      Significant Labs: Reviewed

## 2017-02-18 NOTE — ASSESSMENT & PLAN NOTE
Trend I&  O.  Trend renal function. Echo - EF 50-55 %  Cardiology consulted   Change Lasix IV to po 40 mg BID  Fluid restriction 1. 5 L per day. Discussed with patient.

## 2017-02-18 NOTE — CONSULTS
PULMONARY CONSULTATION    REQUESTED BY:  Ca Ramirez M.D.    HISTORY OF PRESENT ILLNESS:  The patient is a 66-year-old female who was   admitted with increasing dyspnea secondary to volume overload from her diastolic   heart failure.  She has been diuresed and feels that she is back to baseline.    The patient has seen me once before in the office for obstructive sleep apnea   and COPD.  She has not had her repeat followup visit.  She has not had her   pulmonary function test nor her sleep study yet.    PAST MEDICAL HISTORY:  Significant for COPD with chronic hypoxemic respiratory   failure and hypercapnic respiratory failure, diabetes mellitus, stage II   decubitus degenerative joint disease, spondylolisthesis in her cervical spine,   atrial fibrillation, diabetic neuropathy, anxiety, pneumonia, congestive heart   failure, large hernia, atelectasis to the left lung base in the right middle   lobe and a large abdominal hernia.  She has had a retinal repair, a cyst removed   from her left breast, 2 C-sections and a tonsillectomy.  The patient also has a   history of cataracts, depression, hyperlipidemia, iron deficiency anemia,   coronary artery disease with myocardial infarction, extreme morbid obesity,   peripheral vascular disease, pneumonia, and tobacco abuse.  She has also had a   hysterectomy.    SOCIAL HISTORY:  She is .  She is a .  She smokes 2 packs a day   for 48 years, but states she quit smoking 2 weeks ago.  She does not drink   alcohol.  She is retired from the [employer] as a   .    FAMILY HISTORY:  Mother  from liver disease.  Father is alive and has atrial   fibrillation.  One brother has mesothelioma, 1 sister  from a myocardial   infarction.  Her children are alive.    REVIEW OF SYSTEMS:  Her cough is better.  Her shortness of breath is better.    Her legs are better.  She is using budesonide and Spiriva and Xopenex as needed.    She is  not producing any sputum.    MEDICATIONS:  At this time, she is receiving DuoNeb q. 4 h. while awake, vitamin   C, atorvastatin, escitalopram, Fergon, fluconazole, furosemide, gabapentin,   lisinopril, metoprolol, miconazole, mirtazapine, multivitamin, potassium, and   Coumadin.    ALLERGIES:  LISTED TO DILAUDID.    PHYSICAL EXAMINATION:  GENERAL:  She is a female with super morbid obesity.  VITAL SIGNS:  Show a temp of 97.8, a heart rate of 54, respiratory rate of 16,   blood pressure 117/51, sats last taken were 88% with ambulation.  There are now   96% on 3 liters.  HEENT:  The pupils are equally reactive and response to light.  The extraocular   movements are intact.  The pharynx is pink.  NECK:  Supple.  HEART:  Has a regular rate and rhythm.  LUNGS:  Clear to auscultation anteriorly.  Posteriorly, there are decreased   breath sounds in both bases.  ABDOMEN:  Extremely large with herniation of the contents into the left flanks   with a decubitus in the gravity dependent portions of her hernia.  EXTREMITIES:  There is no cyanosis, clubbing or edema.  SKIN:  Without lesions other than the decubitus.  LYMPHATICS:  There is no pitting edema.    DIAGNOSTIC STUDIES:  Chest x-ray here shows cardiomegaly, a right lower lobe   infiltrate and effusion with a clear left lung.  This is the same as it was, but   on admission on the 14th; however, the pulmonary edema has cleared.  The chest   x-ray I have on the patient from December showed atelectasis in the left lung   base and the right middle lobe and this was on a CT of the chest.  There is   chronic elevation of the left hemidiaphragm and no effusions.  The CBC has white   count of 8.7, hemoglobin 10.7, hematocrit 35, and platelets 193.  The INR is   2.0.  The chemistry shows sodium of 143, potassium 3.6, chloride 95, CO2 38, BUN   12, creatinine 0.6, glucose 103.  The BNP was 172 on admission.    IMPRESSION:  A 66-year-old female with extreme morbid obesity who has a  slow to   clear right lower lobe infiltrate and small pleural effusion.  The patient is   not very mobile because of her extreme weight.  She has had issues with slow to   resolve areas of atelectasis in the past.  As the patient is back to her   baseline clinically, I would discharge her with a followup chest x-ray in 2   weeks to see if the infiltrate has cleared.  She does have a followup   appointment with me.  She needs to move her pulmonary functions and sleep study   up as she has been scheduled in May and we will be happy to see her back in the   office.      MARSHALL/  dd: 02/18/2017 15:36:52 (CST)  td: 02/18/2017 17:14:02 (CST)  Doc ID   #[id number]  Job ID #[id number]    CC: Ca Ramirez M.D.

## 2017-02-18 NOTE — PLAN OF CARE
Problem: Patient Care Overview  Goal: Plan of Care Review  Outcome: Ongoing (interventions implemented as appropriate)  AOx4  O2 - 2L/NC  Afib on tele  C/o anxiety adequately relieved with PRN med  Rash to abd folds; order for miconazole powder obtained  Up to BSC  IV Lasix administered x 2 doses this shift  Strict I&O maintained  Bed locked and in lowest position  Call light within reach  Remains free of falls or injury  Will continue to monitor

## 2017-02-18 NOTE — ASSESSMENT & PLAN NOTE
With hypoxemia. Resume home oxygen 2 Liters. Monitor oxygen saturation.  CXR reviewed. Patient with persistent left sided infiltrates as compared to previous CXR. Still with mild hypoxia. Consult pulmonary.

## 2017-02-18 NOTE — PT/OT/SLP PROGRESS
Physical Therapy  Treatment    Nelly Tian   MRN: [id number]   Admitting Diagnosis: Acute on chronic diastolic congestive heart failure    PT Received On: 02/18/17  PT Start Time: 1427     PT Stop Time: 1437    PT Total Time (min): 10 min       Billable Minutes:  Gait [id number]    Treatment Type: Treatment  PT/PTA: PTA     PTA Visit Number: 3       General Precautions: Standard, fall  Orthopedic Precautions: N/A   Braces:      Do you have any cultural, spiritual, [employer]ybeterian conflicts, given your current situation?: none    Subjective:  Communicated with nurse Welsh prior to session.  On first attempt, pt requested to wait a little while, she had just woken up from a nap.  Pt agreeable to ambulate on second attempt.                        Objective:   Patient found with: oxygen, telemetry    Functional Mobility:  Bed Mobility:   Rolling/Turning Right: Modified independent  Supine to Sit: Modified Independent  Sit to Supine: Modified Independent    Transfers:  Sit <> Stand Assistance: Modified Independent  Sit <> Stand Assistive Device: No Assistive Device    Gait:   Gait Distance: 80'  Assistance 1: Stand by Assistance  Gait Assistive Device: Rollator  Gait Pattern: 3-point gait  Gait Deviation(s): decreased madhav, decreased velocity of limb motion, decreased step length, decreased stride length, lateral lean    Balance:   Static Sit: FAIR+: Able to take MINIMAL challenges from all directions  Dynamic Sit: GOOD-: Maintains balance through MODERATE excursions of active trunk movement,     Static Stand: FAIR+: Takes MINIMAL challenges from all directions  Dynamic stand: FAIR+: Needs CLOSE SUPERVISION during gait and is able to right self with minor LOB     Therapeutic Activities and Exercises:  Seated te: milan shay ap x 10 B each     AM-PAC 6 CLICK MOBILITY  How much help from another person does this patient currently need?   1 = Unable, Total/Dependent Assistance  2 = A lot, Maximum/Moderate Assistance  3  = A little, Minimum/Contact Guard/Supervision  4 = None, Modified [place]/Independent         AM-PAC Raw Score CMS G-Code Modifier Level of Impairment Assistance   6 % Total / Unable   7 - 9 CM 80 - 100% Maximal Assist   10 - 14 CL 60 - 80% Moderate Assist   15 - 19 CK 40 - 60% Moderate Assist   20 - 22 CJ 20 - 40% Minimal Assist   23 CI 1-20% SBA / CGA   24 CH 0% Independent/ Mod I     Patient left supine with all lines intact and call button in reach.    Assessment:  Nelly Tian is a 66 y.o. female with a medical diagnosis of Acute on chronic diastolic congestive heart failure and presents with weakness.  No LOB with activity.  Minimal fatigue upon completion of therapy.  Rehab identified problem list/impairments: Rehab identified problem list/impairments: weakness, impaired endurance, impaired functional mobilty, gait instability, decreased lower extremity function, impaired cardiopulmonary response to activity    Activity tolerance: Good    Discharge recommendations:       Barriers to discharge:      Equipment recommendations:       GOALS:   Physical Therapy Goals        Problem: Physical Therapy Goal    Goal Priority Disciplines Outcome Goal Variances Interventions   Physical Therapy Goal     PT/OT, PT Ongoing (interventions implemented as appropriate)     Description:  Goals to be met by: 17     Patient will increase functional independence with mobility by performin. Gait  x 200 feet with Stand-by Assistance using Rolling Walker.   2. Lower extremity exercise program x20 reps with assistance as needed                PLAN:    Patient to be seen 6 x/week  to address the above listed problems via gait training, therapeutic activities, therapeutic exercises  Plan of Care expires: 17  Plan of Care reviewed with: patient         Ruth B Andrea, PTA  2017

## 2017-02-18 NOTE — PROGRESS NOTES
[facility] Medicine  Progress Note    Patient Name: Nelly Tian  MRN: [id number]  Patient Class: IP- Inpatient   Admission Date: 2/14/2017  Length of Stay: 4 days  Attending Physician: Jere Navarro MD  Primary Care Provider: Constantine Bird MD        Subjective:     Principal Problem:Acute on chronic diastolic congestive heart failure    HPI:  Nelly Tian is 66 year old female with PMH of atrial fibrillation, COPD, diastolic heart failure, hypertension, morbid obesity, chronic respiratory failure on home oxygen, and PVD who presents to the ED for evaluation of worsening of chronic dyspnea for ~1-2 weeks associated with increased peripheral edema.  Patient states she took an extra dose of Lasix 2 days without any improvement of shortness of breath. She was evaluated by Home amy nurse this am and recommended to go the ED for evaluation. Denies chest pain. Pt found to have CHF and admitted for further evaluation and treatment.    Hospital Course:  Initiated on IV lasix and Respiratory treatments. Echo completed. Cardiology consulted. Pt reports feeling better but still with SOB above baseline. Continue diuresing. PT/OT consulted. Plan [facility] on discharge    Interval History:  Patient with improvement of dyspnea. Staff reports decreased ox sat to 88% with activity now on 3 L nasal cannula.  Patient with resolution of peripheral edema.     Daughter concerned about rash to left side of abd/skin fold.     Review of Systems   Constitutional: Positive for activity change and fatigue. Negative for appetite change, diaphoresis and fever.   HENT: Negative for facial swelling and sore throat.    Respiratory: Positive for cough and shortness of breath. Negative for apnea, choking, chest tightness, wheezing and stridor.    Cardiovascular: Positive for leg swelling. Negative for chest pain and palpitations.   Gastrointestinal: Negative for abdominal distention, abdominal  pain, nausea and vomiting.   Musculoskeletal: Positive for arthralgias, back pain and gait problem. Negative for neck pain and neck stiffness.   Skin: Positive for rash (left abd skin fold). Negative for color change.   Neurological: Positive for weakness and numbness (peripheral neuropathy ). Negative for facial asymmetry.     Objective:     Vital Signs (Most Recent):  Temp: 98.8 °F (37.1 °C) (02/18/17 1138)  Pulse: 86 (02/18/17 1138)  Resp: 18 (02/18/17 1138)  BP: 127/62 (02/18/17 1138)  SpO2: 96 % (02/18/17 1138) Vital Signs (24h Range):  Temp:  [97.8 °F (36.6 °C)-98.8 °F (37.1 °C)] 98.8 °F (37.1 °C)  Pulse:  [] 86  Resp:  [16-20] 18  SpO2:  [88 %-98 %] 96 %  BP: (117-132)/(51-74) 127/62     Weight: (!) 157.5 kg (347 lb 3.6 oz)  Body mass index is 67.81 kg/(m^2).    Intake/Output Summary (Last 24 hours) at 02/18/17 1354  Last data filed at 02/18/17 0700   Gross per 24 hour   Intake              480 ml   Output             3800 ml   Net            -3320 ml      Physical Exam   Constitutional: She is oriented to person, place, and time. She appears well-developed and well-nourished.   HENT:   Head: Normocephalic and atraumatic.   Mouth/Throat: Oropharynx is clear and moist.   Eyes: Conjunctivae and EOM are normal. Pupils are equal, round, and reactive to light.   Neck: Normal range of motion. Neck supple. No thyromegaly present.   Cardiovascular: Normal rate, regular rhythm, normal heart sounds and intact distal pulses.    + 1 peripheral edema with chronic skin changes    Pulmonary/Chest: Effort normal. She has no wheezes. She has rales (few crackles on the right. ).   Abdominal: Soft. Bowel sounds are normal. She exhibits no distension. There is no tenderness.   Musculoskeletal: Normal range of motion.   Neurological: She is alert and oriented to person, place, and time.   Decreased sensation to bilateral feet   Skin: Skin is warm and dry.   Erythema to left abdomen skin fold with small ulceration. + foul  odor.  (yeast)   Psychiatric: She has a normal mood and affect. Her behavior is normal. Judgment normal.   Nursing note and vitals reviewed.      Significant Labs: Reviewed       Assessment/Plan:      * Acute on chronic diastolic congestive heart failure  Trend I&  O.  Trend renal function. Echo - EF 50-55 %  Cardiology consulted   Change Lasix IV to po 40 mg BID  Fluid restriction 1. 5 L per day. Discussed with patient.       Diabetes mellitus with neuropathy  Chronic. Resume gabapentin    Chronic obstructive pulmonary disease with acute exacerbation  Duo nebs scheduled. Home bronchodilators.       Tobacco dependency  Educated on smoking cessation      Chronic atrial fibrillation  Continue home beta blocker and warfarin  INR today at 2.0      Hypertension associated with diabetes  Chronic. Resume home antihypertensives. accuchecks Ac and HS. Insulin correction scale  Blood sugars       Morbid obesity with BMI of 45.0-49.9, adult  Low fat diet. Consult dietary for education    PVD (peripheral vascular disease)  Chronic. Noted on Arterial US 11/2016      Abdominal aortic atherosclerosis  Chronic.  Resume statin      Iron deficiency anemia  Continue  MVI, vitamin C, and ferrous sulfate      Acute on chronic respiratory failure with hypoxemia  With hypoxemia. Resume home oxygen 2 Liters. Monitor oxygen saturation.  CXR reviewed. Patient with persistent left sided infiltrates as compared to previous CXR. Still with mild hypoxia. Consult pulmonary.       Moderate episode of recurrent major depressive disorder  Chronic. Resume lexapro and trazodone      Yeast dermatitis  Consult wound care. Fluconazole powder to affect area.  Add diflucan orally x 3 days.       VTE Risk Mitigation     None        Discussed POC with patient and daughter. Change Lasix to po in preparation for DC.    Home in 1-2 days with home health.     Long discussion with patient regarding  Fluid restriction and diuretics.     Time spent seeing  patient( greater than 1/2 spent in direct contact) : 60 minutes.       Ca Dupont NP  Department of Hospital Medicine   [facility]

## 2017-02-18 NOTE — PLAN OF CARE
Problem: Physical Therapy Goal  Goal: Physical Therapy Goal  Goals to be met by: 17     Patient will increase functional independence with mobility by performin. Gait x 200 feet with Stand-by Assistance using Rolling Walker.   2. Lower extremity exercise program x20 reps with assistance as needed   Outcome: Ongoing (interventions implemented as appropriate)  Pt ambulated 80' w/rw, sba, 2L02.

## 2017-02-18 NOTE — PLAN OF CARE
02/17/17 1942   Patient Assessment/Suction   Level of Consciousness (AVPU) alert   Respiratory Effort Normal   All Lung Fields Breath Sounds diminished;rhonchi   Cough Frequency infrequent   Cough Type good;nonproductive   PRE-TX-O2-ETCO2   O2 Device (Oxygen Therapy) nasal cannula  (Simultaneous filing. User may not have seen previous data.)   Flow (L/min) 2  (Simultaneous filing. User may not have seen previous data.)   SpO2 (!) 94 %   Pulse 82   Resp 20   Temp 98.3 °F (36.8 °C)   /61   Aerosol Therapy   $ Aerosol Therapy Charges Aerosol Treatment   Respiratory Treatment Status given   SVN/Inhaler Treatment Route mask;with oxygen   Position During Treatment HOB at 30 degrees   Patient Tolerance good   Post-Treatment   Post-treatment Heart Rate (beats/min) 80   Post-treatment Resp Rate (breaths/min) 20   All Fields Breath Sounds unchanged   Pt tolerates treatments well.

## 2017-02-19 VITALS
BODY MASS INDEX: 57.52 KG/M2 | HEIGHT: 60 IN | OXYGEN SATURATION: 98 % | DIASTOLIC BLOOD PRESSURE: 76 MMHG | SYSTOLIC BLOOD PRESSURE: 130 MMHG | WEIGHT: 293 LBS | HEART RATE: 86 BPM | TEMPERATURE: 98 F | RESPIRATION RATE: 18 BRPM

## 2017-02-19 LAB
ANION GAP SERPL CALC-SCNC: 13 MMOL/L
BASOPHILS # BLD AUTO: 0.1 K/UL
BASOPHILS NFR BLD: 1.4 %
BUN SERPL-MCNC: 13 MG/DL
CALCIUM SERPL-MCNC: 9.2 MG/DL
CHLORIDE SERPL-SCNC: 97 MMOL/L
CO2 SERPL-SCNC: 30 MMOL/L
CREAT SERPL-MCNC: 0.6 MG/DL
DIFFERENTIAL METHOD: ABNORMAL
EOSINOPHIL # BLD AUTO: 0.3 K/UL
EOSINOPHIL NFR BLD: 2.6 %
ERYTHROCYTE [DISTWIDTH] IN BLOOD BY AUTOMATED COUNT: 16.7 %
EST. GFR  (AFRICAN AMERICAN): >60 ML/MIN/1.73 M^2
EST. GFR  (NON AFRICAN AMERICAN): >60 ML/MIN/1.73 M^2
GLUCOSE SERPL-MCNC: 108 MG/DL
HCT VFR BLD AUTO: 36 %
HGB BLD-MCNC: 11.2 G/DL
INR PPP: 1.9
LYMPHOCYTES # BLD AUTO: 1.7 K/UL
LYMPHOCYTES NFR BLD: 17.2 %
MCH RBC QN AUTO: 25.7 PG
MCHC RBC AUTO-ENTMCNC: 31.1 %
MCV RBC AUTO: 83 FL
MONOCYTES # BLD AUTO: 1.6 K/UL
MONOCYTES NFR BLD: 15.6 %
NEUTROPHILS # BLD AUTO: 6.3 K/UL
NEUTROPHILS NFR BLD: 63.2 %
PLATELET # BLD AUTO: 221 K/UL
PMV BLD AUTO: 9.4 FL
POCT GLUCOSE: 109 MG/DL (ref 70–110)
POTASSIUM SERPL-SCNC: 3.8 MMOL/L
PROTHROMBIN TIME: 19.8 SEC
RBC # BLD AUTO: 4.35 M/UL
SODIUM SERPL-SCNC: 140 MMOL/L
WBC # BLD AUTO: 10 K/UL

## 2017-02-19 PROCEDURE — 36415 COLL VENOUS BLD VENIPUNCTURE: CPT

## 2017-02-19 PROCEDURE — 94640 AIRWAY INHALATION TREATMENT: CPT

## 2017-02-19 PROCEDURE — 94761 N-INVAS EAR/PLS OXIMETRY MLT: CPT

## 2017-02-19 PROCEDURE — 27000221 HC OXYGEN, UP TO 24 HOURS

## 2017-02-19 PROCEDURE — 80048 BASIC METABOLIC PNL TOTAL CA: CPT

## 2017-02-19 PROCEDURE — 85610 PROTHROMBIN TIME: CPT

## 2017-02-19 PROCEDURE — 25000003 PHARM REV CODE 250: Performed by: PHYSICIAN ASSISTANT

## 2017-02-19 PROCEDURE — 85025 COMPLETE CBC W/AUTO DIFF WBC: CPT

## 2017-02-19 PROCEDURE — 25000003 PHARM REV CODE 250: Performed by: NURSE PRACTITIONER

## 2017-02-19 PROCEDURE — 25000003 PHARM REV CODE 250: Performed by: HOSPITALIST

## 2017-02-19 PROCEDURE — 25000003 PHARM REV CODE 250: Performed by: FAMILY MEDICINE

## 2017-02-19 PROCEDURE — 25000242 PHARM REV CODE 250 ALT 637 W/ HCPCS: Performed by: HOSPITALIST

## 2017-02-19 RX ORDER — FLUCONAZOLE 150 MG/1
150 TABLET ORAL DAILY
Qty: 2 TABLET | Refills: 0 | Status: SHIPPED | OUTPATIENT
Start: 2017-02-19 | End: 2017-02-21

## 2017-02-19 RX ORDER — ASCORBIC ACID 500 MG
500 TABLET ORAL NIGHTLY
Status: ON HOLD | COMMUNITY
Start: 2017-02-19 | End: 2017-04-11

## 2017-02-19 RX ORDER — MICONAZOLE NITRATE 2 %
POWDER (GRAM) TOPICAL 2 TIMES DAILY
Refills: 0 | COMMUNITY
Start: 2017-02-19 | End: 2017-06-19 | Stop reason: SDUPTHER

## 2017-02-19 RX ORDER — POTASSIUM CHLORIDE 20 MEQ/1
20 TABLET, EXTENDED RELEASE ORAL DAILY
Qty: 30 TABLET | Refills: 1 | Status: SHIPPED | OUTPATIENT
Start: 2017-02-19 | End: 2017-05-01 | Stop reason: SDUPTHER

## 2017-02-19 RX ORDER — FERROUS SULFATE 325(65) MG
325 TABLET, DELAYED RELEASE (ENTERIC COATED) ORAL 2 TIMES DAILY WITH MEALS
Refills: 0 | Status: ON HOLD | COMMUNITY
Start: 2017-02-19 | End: 2017-03-18

## 2017-02-19 RX ORDER — FUROSEMIDE 40 MG/1
40 TABLET ORAL 2 TIMES DAILY
Qty: 60 TABLET | Refills: 0 | Status: SHIPPED | OUTPATIENT
Start: 2017-02-19 | End: 2017-08-22 | Stop reason: SDUPTHER

## 2017-02-19 RX ADMIN — THERA TABS 1 TABLET: TAB at 09:02

## 2017-02-19 RX ADMIN — FLUCONAZOLE 150 MG: 50 TABLET ORAL at 09:02

## 2017-02-19 RX ADMIN — POTASSIUM CHLORIDE 20 MEQ: 1500 TABLET, EXTENDED RELEASE ORAL at 09:02

## 2017-02-19 RX ADMIN — Medication: at 09:02

## 2017-02-19 RX ADMIN — FUROSEMIDE 40 MG: 40 TABLET ORAL at 09:02

## 2017-02-19 RX ADMIN — IPRATROPIUM BROMIDE AND ALBUTEROL SULFATE 3 ML: .5; 3 SOLUTION RESPIRATORY (INHALATION) at 07:02

## 2017-02-19 RX ADMIN — FERROUS SULFATE TAB EC 325 MG (65 MG FE EQUIVALENT) 325 MG: 325 (65 FE) TABLET DELAYED RESPONSE at 09:02

## 2017-02-19 RX ADMIN — ATORVASTATIN CALCIUM 20 MG: 20 TABLET, FILM COATED ORAL at 09:02

## 2017-02-19 RX ADMIN — LISINOPRIL 20 MG: 10 TABLET ORAL at 09:02

## 2017-02-19 RX ADMIN — GABAPENTIN 600 MG: 300 CAPSULE ORAL at 09:02

## 2017-02-19 RX ADMIN — HYDROCODONE BITARTRATE AND ACETAMINOPHEN 1 TABLET: 10; 325 TABLET ORAL at 11:02

## 2017-02-19 RX ADMIN — METOPROLOL SUCCINATE 12.5 MG: 25 TABLET, EXTENDED RELEASE ORAL at 09:02

## 2017-02-19 RX ADMIN — CLONAZEPAM 1 MG: 1 TABLET ORAL at 09:02

## 2017-02-19 NOTE — PLAN OF CARE
02/19/17 1425   Final Note   Assessment Type Final Discharge Note   Discharge Disposition Home-Health   Discharge planning education complete? Yes

## 2017-02-19 NOTE — PLAN OF CARE
Problem: Patient Care Overview  Goal: Plan of Care Review  Outcome: Ongoing (interventions implemented as appropriate)  Patient alert and oriented resting in bed. NAD. Denies pain or SOB. VSS.  No IV assess. BSC. Plan of care reviewed with patient. Verbalizes understanding.Call light in reach. Pt free from fall or injury. Will monitor.

## 2017-02-19 NOTE — PLAN OF CARE
Problem: Patient Care Overview  Goal: Plan of Care Review  Outcome: Ongoing (interventions implemented as appropriate)  Renay AEROSOL TX WELL bbs DECREASED o2 ON @ 2l

## 2017-02-19 NOTE — NURSING
"Pt refusing wound care on left abd. Pt states " I will just wait until i am home and can shower". Pt advised pt she can shower while her and refuses since she will be discharged in am.  "

## 2017-02-19 NOTE — PROGRESS NOTES
1256 pm CM notified  Ca HO NP that patient cannot get [facility] as they are not in network with [facility].  Patient will use [facility] since she had them in the past. CM faxed orders, AVS, consults, FS and H&P to [facility] and called in referral.   130pm CM spoke to Chiquis, nurse on call for [facility].   220pm Patient is aware and agrees to use [facility] as she previously has.

## 2017-02-19 NOTE — NURSING
Discharge instructions and medications reviewed with pt, understanding verbalized. Pt to be discharged home with daughter. Tele monitor returned.

## 2017-02-19 NOTE — PLAN OF CARE
02/18/17 1915   Patient Assessment/Suction   Level of Consciousness (AVPU) alert   Respiratory Effort Normal;Unlabored   All Lung Fields Breath Sounds diminished   Cough Frequency infrequent   Cough Type good;nonproductive   PRE-TX-O2-ETCO2   O2 Device (Oxygen Therapy) nasal cannula   Flow (L/min) 3   SpO2 (!) 94 %   Pulse 79   Resp 20   Aerosol Therapy   $ Aerosol Therapy Charges Aerosol Treatment   Respiratory Treatment Status given   SVN/Inhaler Treatment Route mask;with oxygen   Position During Treatment HOB at 45 degrees   Patient Tolerance good   Post-Treatment   Post-treatment Heart Rate (beats/min) 78   Post-treatment Resp Rate (breaths/min) 20   All Fields Breath Sounds unchanged   Pt tolerates treatments well.

## 2017-02-20 ENCOUNTER — TELEPHONE (OUTPATIENT)
Dept: FAMILY MEDICINE | Facility: CLINIC | Age: 67
End: 2017-02-20

## 2017-02-20 ENCOUNTER — ANTI-COAG VISIT (OUTPATIENT)
Dept: CARDIOLOGY | Facility: CLINIC | Age: 67
End: 2017-02-20

## 2017-02-20 DIAGNOSIS — Z79.01 LONG TERM CURRENT USE OF ANTICOAGULANT THERAPY: ICD-10-CM

## 2017-02-20 LAB — INR PPP: 2.5

## 2017-02-20 NOTE — TELEPHONE ENCOUNTER
----- Message from Chelsea Olivo sent at 2/20/2017 11:44 AM CST -----  Contact: self: [phone]  Patient was just released from [facility] yesterday with congestive heart failure. She was told to follow up with office this week. Please call with availability.

## 2017-02-20 NOTE — TELEPHONE ENCOUNTER
Hospital follow-up scheduled for 3-2-17.  Patient has non-fasting labs scheduled on this date at the hospital, requests labs be rescheduled at office due to appointment. Labs rescheduled for 3-2-17 at office as requested. Patient verbalized understanding and agreed to appointment date and times.

## 2017-02-20 NOTE — DISCHARGE SUMMARY
[facility] Medicine  Discharge Summary      Patient Name: Nelly Tian  MRN: [id number]  Admission Date: 2/14/2017  Hospital Length of Stay: 5 days  Discharge Date and Time: 2/19/2017  2:23 PM  Attending Physician: No att. providers found   Discharging Provider: Ca Dupont NP  Primary Care Provider: Constantine Bird MD      HPI:   Nelly Tian is 66 year old female with PMH of atrial fibrillation, COPD, diastolic heart failure, hypertension, morbid obesity, chronic respiratory failure on home oxygen, and PVD who presents to the ED for evaluation of worsening of chronic dyspnea for ~1-2 weeks associated with increased peripheral edema.  Patient states she took an extra dose of Lasix 2 days without any improvement of shortness of breath. She was evaluated by Home amy nurse this am and recommended to go the ED for evaluation. Denies chest pain. Pt found to have CHF and admitted for further evaluation and treatment.    * No surgery found *      Indwelling Lines/Drains at time of discharge:   Lines/Drains/Airways          No matching active lines, drains, or airways        Hospital Course:   Initiated on IV lasix and Respiratory treatments. Echo completed. Cardiology consulted. Pt reports feeling better but still with SOB above baseline. Continue diuresing. Placed on fluid restriction 1.5 L day PT/OT consulted.  Patient diuresed well with lasix IV. Pulmonary consulted. Patient known to Dr. ETHEL Tolbert and CT chest reports reviewed from [facility] (scanned in Epic).  Patient cleared for Dc from pulmonary standpoint. Plan [facility] on discharge.     Patient examined: chest: coarse no rales. Ext: trace edema (improved).   Patient stable for DC. Heart failure education provided.           Consults:   Consults         Status Ordering Provider     Inpatient consult to Cardiology  Once     Provider:  Chu Mack MD    Final result FEE, SCARLETT ROSE     Inpatient consult to Heart  Failure Nurse  Once     Provider:  (Not yet assigned)    Completed SAMUEL READ     Inpatient consult to Heart Failure Nurse  Once     Provider:  (Not yet assigned)    Completed NEENA LICONA     Inpatient consult to Social Work/Case Management  Once     Provider:  (Not yet assigned)    Completed NEENA LICONA     Nutrition Services Referral  Once     Provider:  (Not yet assigned)    Completed SAMUEL READ          Significant Diagnostic Studies: Labs:   BMP:   Recent Labs  Lab 02/19/17  0450         K 3.8   CL 97   CO2 30*   BUN 13   CREATININE 0.6   CALCIUM 9.2    and CMP   Recent Labs  Lab 02/19/17  0450      K 3.8   CL 97   CO2 30*      BUN 13   CREATININE 0.6   CALCIUM 9.2   ANIONGAP 13   ESTGFRAFRICA >60   EGFRNONAA >60     Radiology: X-Ray: CXR: X-Ray Chest 1 View (CXR): No results found for this visit on 02/14/17.  Cardiac Graphics: Echocardiogram:   2D echo with color flow doppler:   Results for orders placed or performed during the hospital encounter of 02/14/17   2D echo with color flow doppler   Result Value Ref Range    EF 51 55 - 65    Mitral Valve Regurgitation TRIVIAL TO MILD     Est. PA Systolic Pressure 39        Pending Diagnostic Studies:     None        Final Active Diagnoses:    Diagnosis Date Noted POA    PRINCIPAL PROBLEM:  Acute on chronic diastolic congestive heart failure [I50.33] 02/14/2017 Yes    Yeast dermatitis [B37.2] 02/18/2017 Yes    Moderate episode of recurrent major depressive disorder [F33.1] 02/16/2017 Yes    Acute on chronic respiratory failure with hypoxemia [J96.21] 11/16/2016 Yes     Chronic    Iron deficiency anemia [D50.9] 02/03/2016 Yes    PVD (peripheral vascular disease) [I73.9] 01/19/2016 Yes    Morbid obesity with BMI of 45.0-49.9, adult [E66.01, Z68.42] 01/19/2016 Not Applicable    Abdominal aortic atherosclerosis [I70.0] 01/19/2016 Yes    Hypertension associated with diabetes [E11.59, I10] 01/19/2016 Yes     Chronic atrial fibrillation [I48.2] 11/10/2015 Yes    Tobacco dependency [F17.200] 12/18/2014 Yes    Chronic obstructive pulmonary disease with acute exacerbation [J44.1]  Yes    Diabetes mellitus with neuropathy [E11.40] 08/13/2012 Yes     Chronic      Problems Resolved During this Admission:    Diagnosis Date Noted Date Resolved POA      No new Assessment & Plan notes have been filed under this hospital service since the last note was generated.  Service: Hospital Medicine      Discharged Condition: stable    Disposition[place] [facility]    Follow Up:  Follow-up Information     Follow up with Outpatient Complex Case Management.    Why:  Your assigned outpatient  is Jayde Reyes [phone]    Contact information:    [address], [address]  [facility] [address] [phone]          Follow up with Constantine Bird MD In 1 week.    Specialty:  Family Medicine    Why:  hospital follow up    Contact information:    [address]  [place] [address] [phone]          Follow up with Chu Mack MD In 3 weeks.    Specialty:  Cardiology    Why:  hospital follow up    Contact information:    [address]  [address]  [place] [address] [phone]          Follow up with Julianna Tolbert MD In 3 weeks.    Specialty:  Pulmonary Disease    Why:  hospital follow up    Contact information:    [address]  [address]  [place] [address] [phone]          Follow up with [facility].    Specialty:  Home Health Services    Why:  SN/PT, BMP and PT/INR weekly x 3 weeks    Contact information:    [address]  [address]  [facility] [id number] [phone]          Patient Instructions:     Ambulatory referral to Outpatient Case Management   Referral Priority: Routine Referral Type: Consultation   Referral Reason: Specialty Services Required    Number of Visits Requested: 1      Referral to Home health   Referral Priority: Routine Referral Type: Home Health    Referral Reason: Specialty Services Required    Referred to Provider: [facility] MS DUGLAS Requested Specialty: Home Health Services   Number of Visits Requested: 1      Diet general   Order Specific Question Answer Comments   Additional restrictions: Diabetic 1800      Activity as tolerated     Call MD for:  severe uncontrolled pain     Call MD for:  difficulty breathing or increased cough       Medications:  Reconciled Home Medications:   Discharge Medication List as of 2/19/2017  1:11 PM      START taking these medications    Details   ascorbic acid, vitamin C, (VITAMIN C) 500 MG tablet Take 1 tablet (500 mg total) by mouth every evening., Starting 2/19/2017, Until Discontinued, OTC      ferrous sulfate 325 (65 FE) MG EC tablet Take 1 tablet (325 mg total) by mouth 2 (two) times daily with meals., Starting 2/19/2017, Until Discontinued, OTC      fluconazole (DIFLUCAN) 150 MG Tab Take 1 tablet (150 mg total) by mouth once daily., Starting 2/19/2017, Until Tue 2/21/17, Normal      miconazole NITRATE 2 % (MICOTIN) 2 % top powder Apply topically 2 (two) times daily. Skin folds, Starting 2/19/2017, Until Discontinued, OTC      potassium chloride SA (K-DUR,KLOR-CON) 20 MEQ tablet Take 1 tablet (20 mEq total) by mouth once daily., Starting 2/19/2017, Until Discontinued, Normal         CONTINUE these medications which have CHANGED    Details   furosemide (LASIX) 40 MG tablet Take 1 tablet (40 mg total) by mouth 2 (two) times daily., Starting 2/19/2017, Until Mon 2/19/18, Normal         CONTINUE these medications which have NOT CHANGED    Details   acetaminophen (TYLENOL) 325 MG tablet Take 2 tablets (650 mg total) by mouth every 6 (six) hours as needed., Starting 11/5/2016, Until Discontinued, OTC      albuterol (ACCUNEB) 0.63 mg/3 mL Nebu INHALE THE CONTENTS OF 1 VIAL  BY  NEBULIZER EVERY 6 HOURS AS NEEDED, Normal      albuterol (VENTOLIN HFA) 90 mcg/actuation inhaler INHALE TWO PUFFS BY MOUTH EVERY 6 HOURS AS  NEEDED FOR WHEEZING, Normal      albuterol-ipratropium 2.5mg-0.5mg/3mL (DUO-NEB) 0.5 mg-3 mg(2.5 mg base)/3 mL nebulizer solution INHALE THE CONTENTS OF 1 VIAL VIA NEBULIZER EVERY 6 HOURS AS NEEDED FOR  WHEEZING (DO NOT USE WITH ALBUTEROL INHALER), Normal      atorvastatin (LIPITOR) 20 MG tablet Take 1 tablet (20 mg total) by mouth once daily., Starting 12/29/2016, Until Fri 12/29/17, Normal      blood sugar diagnostic (TRUE METRIX GLUCOSE TEST STRIP) Strp Use to test blood sugar three times a day   DX:E11.9, Normal      blood-glucose meter (TRUE METRIX AIR GLUCOSE METER) kit Use as instructed to test blood sugar   DX:E11.9, Normal      budesonide (PULMICORT) 0.5 mg/2 mL nebulizer solution INHALE THE CONTENTS OF 1 VIAL VIA NEBULIZER EVERY DAY, Normal      clonazePAM (KLONOPIN) 1 MG tablet Take 1 tablet (1 mg total) by mouth 2 (two) times daily as needed for Anxiety., Starting 10/21/2016, Until Sat 10/21/17, Print      escitalopram oxalate (LEXAPRO) 10 MG tablet TAKE 1 TABLET ONE TIME DAILY, Normal      gabapentin (NEURONTIN) 600 MG tablet Take 1 tablet (600 mg total) by mouth 3 (three) times daily., Starting 1/31/2017, Until Wed 1/31/18, Normal      hydrocodone-acetaminophen 10-325mg (NORCO)  mg Tab Take 1 tablet by mouth every 8 (eight) hours as needed., Starting 1/31/2017, Until Discontinued, Normal      lancets (TRUEPLUS LANCETS) 33 gauge Misc 1 lancet by Misc.(Non-Drug; Combo Route) route 3 (three) times daily. To test blood sugar   DX: E11.9, Starting 12/23/2016, Until Discontinued, Normal      levalbuterol (XOPENEX) 0.63 mg/3 mL nebulizer solution INHALE THE CONTENTS OF 1 VIAL BY NEBULIZATION 3 (THREE) TIMES DAILY., Normal      lisinopril (PRINIVIL,ZESTRIL) 20 MG tablet TAKE 1 TABLET ONE TIME DAILY, Normal      metformin (GLUCOPHAGE) 500 MG tablet TAKE 1 TABLET TWICE DAILY WITH MEALS, Normal      methocarbamol (ROBAXIN) 750 MG Tab TAKE 1 TABLET FOUR TIMES DAILY, Normal      metoprolol succinate  (TOPROL-XL) 25 MG 24 hr tablet Take 0.5 tablets (12.5 mg total) by mouth once daily., Starting 12/29/2016, Until Sat 1/28/17, Normal      multivitamin (THERAGRAN) tablet Take 1 tablet by mouth once daily., Starting 11/5/2016, Until Discontinued, OTC      polyethylene glycol (GLYCOLAX) 17 gram/dose powder MIX 17 GRAMS IN LIQUID AND DRINK EVERY DAY AS NEEDED, Normal      silver sulfADIAZINE 1% (SILVADENE) 1 % cream Apply topically 2 (two) times daily., Starting 1/25/2017, Until Discontinued, Normal      SPIRIVA WITH HANDIHALER 18 mcg inhalation capsule INHALE THE CONTENTS OF 1 CAPSULE EVERY DAY, Normal      temazepam (RESTORIL) 15 mg Cap TAKE 1 CAPSULE ONE TIME DAILY, Normal      trazodone (DESYREL) 100 MG tablet TAKE 1 TABLET EVERY NIGHT AS NEEDED  FOR  INSOMNIA, Normal      warfarin (COUMADIN) 5 MG tablet TAKE ONE TABLET EVERY DAY OR AS DIRECTED BY COUMADIN CLINIC, Normal         STOP taking these medications       meloxicam (MOBIC) 7.5 MG tablet Comments:   Reason for Stopping:             Time spent on the discharge of patient: 45 minutes    Ca Dupont NP  Department of Hospital Medicine  [facility]

## 2017-02-21 ENCOUNTER — OUTPATIENT CASE MANAGEMENT (OUTPATIENT)
Dept: ADMINISTRATIVE | Facility: OTHER | Age: 67
End: 2017-02-21

## 2017-02-21 ENCOUNTER — PATIENT OUTREACH (OUTPATIENT)
Dept: ADMINISTRATIVE | Facility: CLINIC | Age: 67
End: 2017-02-21

## 2017-02-21 NOTE — PATIENT INSTRUCTIONS
Left-Sided Congestive Heart Failure    The heart is a large muscle that pumps blood throughout the body. Blood carries oxygen to all the organs (including the brain), muscles, and skin of your body. After the body takes the oxygen out of the blood, the blood returns to the heart. The right side of the heart collects that blood and pumps it to the lungs to receive fresh oxygen. This oxygen-rich blood from the lungs then returns to the left side of the heart, where it is pumped back out to the brain and rest of the body, starting the process all over.   Heart failure occurs when the heart muscle is weakened. This can occur after a heart attack or with significant coronary artery disease. This affects the pumping action of the heart.   When the left side of the heart is weakened, it cant handle the blood it is getting from the lungs. Pressure then builds up in the veins of the lungs, causing fluid to leak into the lung tissues. This may be referred to as congestive heart failure. This causes you to feel short of breath, weak, or dizzy. These symptoms are often worse with exertion, such as climbing stairs or walking up hills. Lying flat is uncomfortable and can make your breathing worse. This may make sleeping difficult and force you to use extra pillows to elevate your upper body to help you sleep well.  Causes of heart failure include:  · Coronary artery disease  · Heart attack in the past (also known as acute myocardial infarction, or AMI)  · High blood pressure  · Damaged heart valve  · Diabetes  · Obesity  · Cigarette smoking  · Alcohol abuse  Treatment  Heart failure is a chronic condition. There is no cure. The purpose of medical treatment is to improve the pumping action of the heart, and remove excess water and fluids from the body. A number of medicines can help reach this goal, improve symptoms and keep the heart from becoming weaker. In some cases of severe heart failure, a mechanical device will be  placed in the heart to help the heart pump. Another major goal is to better treat the causes of heart failure, such as diabetes, high blood pressure, and your lifestyle.  Home care  · Check your weight every day. A sudden increase in weight gain could mean worsening heart failure.  ¨ Use the same scale every day.  ¨ Weigh yourself at the same time every day.  ¨ Make sure the scale is on the floor, not on a rug.  ¨ Keep a record of your weight every day, so your healthcare provider can see it. If you are not given a log sheet for this, keep a separate journal for this purpose.   · Cut back on how much salt (sodium) you eat:  ¨ Your provider will tell you how much salt to have daily, usually 2,000 mg or less.  ¨ Avoid high-salt foods. These include olives, pickles, smoked meats, processed foods, and salted potato chips.  ¨ Don't add salt to your food at the table. Use only small amounts of salt when cooking.  ¨ Avoid binging on salt-heavy meals.  · Follow your healthcare provider's recommendations about how much fluid you should have.  · Stop smoking.  · Cut back on the amount of alcohol you drink.  · Lose weight if you are overweight. The excess weight adds a lot of stress on the workload of the heart.  · Stay active. Talk to your provider about an exercise program that is safe for your heart.  · Keep your feet elevated to reduce swelling. Ask your provider about support hose as a preventive treatment for daytime leg swelling.  · Follow your healthcare provider's instructions closely.  Besides taking your medicine as instructed, an important part of treatment includes lifestyle changes. These include diet, physical activity, stopping smoking, and weight control.  Improve your diet. Often in the hospital, people are given a heart healthy diet. This includes more fresh foods, lower saturated fat, less processed foods, and lower salt.  Follow-up care  Follow up with your healthcare provider, or as advised. Make sure to  keep any appointments that were made for you. This can help better control heart failure.  If an X-ray was done, you will be told of any new findings that may affect your care.  Call 911  Call 911 if you:  · Become severely short of breath  · Feel lightheaded, or feel like you might pass out or faint  · Have chest pain or discomfort that is different than usual, the medicines your provider told you to use for this don't help, or the pain lasts longer than 10 to 15 minutes  · Develop a rapid heart rate suddenly  When to seek medical advice  Call your healthcare provider right away if you have any of these signs of worsening heart failure:  · Sudden weight gain --  more than 2 pounds in 1 day, or 5 pounds in 1 week, or whatever weight gain you were told to report by your provider  · Trouble breathing not related to being active  · New or increased swelling of your legs or ankles  · Swelling or pain in your abdomen  · Breathing trouble at night, waking up short of breath or needing more pillows to elevate your upper body to help you breathe  · Frequent coughing that doesnt go away  · Feeling much more tired than usual  Date Last Reviewed: 1/4/2016  © 8309-4352 [employer]. [address] [place] [address], [place] [address]. All rights reserved. This information is not intended as a substitute for professional medical care. Always follow your healthcare professional's instructions.        Discharge Instructions: Eating a Low-Salt Diet  Your health care provider has prescribed a low-salt diet for you. Most people with heart problems need to eat less salt, which is full of sodium. Too much sodium is linked to high blood pressure, which is linked to a greater risk of heart disease, stroke, blindness, and kidney problems.  Home care    Learn ways to cut back on salt (sodium):  · Eat less frozen, canned, dried, packaged, and fast foods. These often contain high amounts of sodium.  · Season foods with herbs instead of  salt when you cook.  · Season with flavorings such as pepper, lemon, garlic, and onion.  · Dont add salt to your food at the table.  · Sprinkle salt-free herbal blends on meats and vegetables.  Learn to read food labels carefully:  · Look for the total amount of sodium per serving.  · Look for foods labeled low sodium, reduced sodium, or no added salt.  · Beware. Salt goes by many names. Cut down on foods with these words (all forms of salt) listed as ingredients:  ¨ Salt  ¨ Sodium  ¨ Soy sauce  ¨ Baking soda  ¨ Baking powder  ¨ MSG  ¨ Monosodium  ¨ Na (the chemical symbol for sodium)  Other ideas:  · Use more fresh food. Buy more fruits and vegetables.  · Select lean meats, fish, and poultry.  · Find a cookbook with low-salt recipes. Youll find ideas for tasty meals that are healthy for your heart.  ·   When eating out, ask questions about the menu. Tell the  you're on a low-salt diet.  ¨ If you order fish, chicken, beef, or pork, ask the  to have it broiled, baked, poached, or grilled without salt, butter, or breading.  ¨ Choose plain steamed rice, boiled noodles, and baked or boiled potatoes. Top potatoes with chives and a little sour cream instead of butter.  · Avoid antacids that are high in salt. Check the label before you buy.  Follow-up  Make a follow-up appointment with a nutritionist as directed by our staff.  Date Last Reviewed: 6/20/2015  © 0715-2166 [employer]. [address] [place] [address], [place] [address]. All rights reserved. This information is not intended as a substitute for professional medical care. Always follow your healthcare professional's instructions.

## 2017-02-21 NOTE — PROGRESS NOTES
02/21/17-Called and spoke to Nelly Tian, 66 year old female. Called [facility] they will fax the med rec to me.Will mail to patient-Diabetes and heart healthy nutrition therapy, heart failure ,warning signs of a flare up, heart failure know your base lines, weight log, heart healthy recipes, food pantry information, [facility] financial assistance, and [employer] hollie for eyeglasses. Will follow up with patient in one week.

## 2017-02-21 NOTE — PROGRESS NOTES
Late entry.  C3 nurse attempted to contact patient. The following occurred:   C3 nurse attempted to contact Nelly Tian for a TCC post hospital discharge follow up call. The patient is unable to conduct the call @ this time. The patient requested a callback.    The patient has a scheduled HOSFU appointment with Tamy Garcia NP on 03/02/17  @ 0920hrs. Message sent to Physician staff.

## 2017-02-23 ENCOUNTER — ANTI-COAG VISIT (OUTPATIENT)
Dept: CARDIOLOGY | Facility: CLINIC | Age: 67
End: 2017-02-23

## 2017-02-23 ENCOUNTER — LAB VISIT (OUTPATIENT)
Dept: LAB | Facility: HOSPITAL | Age: 67
End: 2017-02-23
Attending: FAMILY MEDICINE
Payer: MEDICARE

## 2017-02-23 DIAGNOSIS — I50.43 ACUTE ON CHRONIC COMBINED SYSTOLIC AND DIASTOLIC HEART FAILURE: Primary | ICD-10-CM

## 2017-02-23 DIAGNOSIS — Z79.01 LONG TERM CURRENT USE OF ANTICOAGULANT THERAPY: ICD-10-CM

## 2017-02-23 LAB
ANION GAP SERPL CALC-SCNC: 14 MMOL/L
BUN SERPL-MCNC: 14 MG/DL
CALCIUM SERPL-MCNC: 9.6 MG/DL
CHLORIDE SERPL-SCNC: 96 MMOL/L
CO2 SERPL-SCNC: 30 MMOL/L
CREAT SERPL-MCNC: 0.8 MG/DL
EST. GFR  (AFRICAN AMERICAN): >60 ML/MIN/1.73 M^2
EST. GFR  (NON AFRICAN AMERICAN): >60 ML/MIN/1.73 M^2
GLUCOSE SERPL-MCNC: 91 MG/DL
INR PPP: 4.6
INR PPP: 4.6
POTASSIUM SERPL-SCNC: 3.6 MMOL/L
PROTHROMBIN TIME: 46.6 SEC
SODIUM SERPL-SCNC: 140 MMOL/L

## 2017-02-23 PROCEDURE — 80048 BASIC METABOLIC PNL TOTAL CA: CPT

## 2017-02-23 PROCEDURE — 85610 PROTHROMBIN TIME: CPT

## 2017-02-24 ENCOUNTER — OUTPATIENT CASE MANAGEMENT (OUTPATIENT)
Dept: ADMINISTRATIVE | Facility: OTHER | Age: 67
End: 2017-02-24

## 2017-02-24 NOTE — PROGRESS NOTES
02/24/17-Received Med list from [facility]- Per home health list patient is taking these meds which is not on our med list-  Loratadine 10 mg 1 tablet daily  Metoprolol succinate 25 mg 0.5 tablet daily   Vitamin  B 12 oral 1, 000 MCG 1 tablet daily   Bisacodyl 5 mg 1 tablet daily PRN   Colace oral 100 mg 1 tablet daily   Fluconazole 150 mg 1 tablet daily   Gas relief oral 125 mg 1 tablet - 3 x daily PRN     Per our Med Rec - Patient is taking-  Tylenol 325 mg 2 tablets every 6 hours PRN  Albuterol Accuneb 0.63 mg/3 L per nebulizer every 6 hours PRN   Ventolin HFA 90 mcg 2 puffs inhaler every 6 hours PRN  Duonebs 0.5mg-3 mg per nebulizer 1 vial  every 6 hours PRN   Pulmicort 0.5mg/2ml per nebulizer 1 vial every day  lexapro 10mg 1 tablet daily   xopenex 0.63 mg per nebulizer 1 vial 3 x a day     Will send fax to [facility] to verify patient's med list again.

## 2017-03-01 ENCOUNTER — OUTPATIENT CASE MANAGEMENT (OUTPATIENT)
Dept: ADMINISTRATIVE | Facility: OTHER | Age: 67
End: 2017-03-01

## 2017-03-01 ENCOUNTER — ANTI-COAG VISIT (OUTPATIENT)
Dept: CARDIOLOGY | Facility: CLINIC | Age: 67
End: 2017-03-01

## 2017-03-01 ENCOUNTER — DOCUMENTATION ONLY (OUTPATIENT)
Dept: FAMILY MEDICINE | Facility: CLINIC | Age: 67
End: 2017-03-01

## 2017-03-01 ENCOUNTER — TELEPHONE (OUTPATIENT)
Dept: FAMILY MEDICINE | Facility: CLINIC | Age: 67
End: 2017-03-01

## 2017-03-01 ENCOUNTER — LAB VISIT (OUTPATIENT)
Dept: LAB | Facility: HOSPITAL | Age: 67
End: 2017-03-01
Attending: FAMILY MEDICINE
Payer: MEDICARE

## 2017-03-01 DIAGNOSIS — I50.43 ACUTE ON CHRONIC COMBINED SYSTOLIC AND DIASTOLIC HEART FAILURE: Primary | ICD-10-CM

## 2017-03-01 DIAGNOSIS — Z79.01 LONG TERM CURRENT USE OF ANTICOAGULANT THERAPY: ICD-10-CM

## 2017-03-01 LAB
ANION GAP SERPL CALC-SCNC: 10 MMOL/L
BUN SERPL-MCNC: 25 MG/DL
CALCIUM SERPL-MCNC: 9.7 MG/DL
CHLORIDE SERPL-SCNC: 102 MMOL/L
CO2 SERPL-SCNC: 27 MMOL/L
CREAT SERPL-MCNC: 0.8 MG/DL
EST. GFR  (AFRICAN AMERICAN): >60 ML/MIN/1.73 M^2
EST. GFR  (NON AFRICAN AMERICAN): >60 ML/MIN/1.73 M^2
GLUCOSE SERPL-MCNC: 112 MG/DL
INR PPP: 2.3
POTASSIUM SERPL-SCNC: 4.3 MMOL/L
SODIUM SERPL-SCNC: 139 MMOL/L

## 2017-03-01 PROCEDURE — 80048 BASIC METABOLIC PNL TOTAL CA: CPT

## 2017-03-01 NOTE — TELEPHONE ENCOUNTER
----- Message from Jayde Reyes sent at 3/1/2017  2:08 PM CST -----  Contact: Jayde Reyes RN OPCM   Patient has an appt with Tamy on 03/02/17 at 9:20 am. Med Rec done with patient and [facility]. Patient is also taking Vit. B 12 oral 1000 MCG 1 tab daily, bisacody 5 MG 1 tablet daily PRN, colace 100 MG 1 tab daily, Gas Relief Oral 124 MG 1 tab 3x daily PRN, Claritin 10 MG 1 tab daily.    Thank you for your assistance,   Jayde Reyes RN OPCM

## 2017-03-01 NOTE — PROGRESS NOTES
Pre-Visit Chart Review  For Appointment Scheduled on 3/2/17    Health Maintenance Due   Topic Date Due    DEXA SCAN  11/15/1991    Zoster Vaccine  11/15/2011    Colonoscopy  02/01/2016

## 2017-03-01 NOTE — PROGRESS NOTES
03/01/17-Went over patient's medications with her. Patient has an appt with Tamy Garcia on 03/01/17 and will bring her med list with her. Patient has received her mail and is reading the diabetes and heart healthy nutrition therapy. Patient is weighing herself and keeping a log.Sent a message to smoking cessation that patient would like to start back to classes. Patient to call medicaid about transportation.  [facility] is having [facility] difficulties. Will follow up with patient in two weeks.

## 2017-03-02 ENCOUNTER — OUTPATIENT CASE MANAGEMENT (OUTPATIENT)
Dept: ADMINISTRATIVE | Facility: OTHER | Age: 67
End: 2017-03-02

## 2017-03-02 ENCOUNTER — TELEPHONE (OUTPATIENT)
Dept: FAMILY MEDICINE | Facility: CLINIC | Age: 67
End: 2017-03-02

## 2017-03-02 ENCOUNTER — LAB VISIT (OUTPATIENT)
Dept: LAB | Facility: HOSPITAL | Age: 67
End: 2017-03-02
Attending: INTERNAL MEDICINE
Payer: MEDICARE

## 2017-03-02 DIAGNOSIS — Z86.2 HISTORY OF ANEMIA: ICD-10-CM

## 2017-03-02 LAB
BASOPHILS # BLD AUTO: 0.04 K/UL
BASOPHILS NFR BLD: 0.4 %
DIFFERENTIAL METHOD: ABNORMAL
EOSINOPHIL # BLD AUTO: 0.3 K/UL
EOSINOPHIL NFR BLD: 2.5 %
ERYTHROCYTE [DISTWIDTH] IN BLOOD BY AUTOMATED COUNT: 16 %
HCT VFR BLD AUTO: 46.3 %
HGB BLD-MCNC: 13.5 G/DL
IRON SERPL-MCNC: 54 UG/DL
LYMPHOCYTES # BLD AUTO: 2 K/UL
LYMPHOCYTES NFR BLD: 20.3 %
MCH RBC QN AUTO: 26 PG
MCHC RBC AUTO-ENTMCNC: 29.2 %
MCV RBC AUTO: 89 FL
MONOCYTES # BLD AUTO: 1.4 K/UL
MONOCYTES NFR BLD: 14.1 %
NEUTROPHILS # BLD AUTO: 6.2 K/UL
NEUTROPHILS NFR BLD: 62.4 %
PLATELET # BLD AUTO: 289 K/UL
PMV BLD AUTO: 11.1 FL
RBC # BLD AUTO: 5.19 M/UL
SATURATED IRON: 11 %
TOTAL IRON BINDING CAPACITY: 493 UG/DL
TRANSFERRIN SERPL-MCNC: 333 MG/DL
WBC # BLD AUTO: 10 K/UL

## 2017-03-02 PROCEDURE — 36415 COLL VENOUS BLD VENIPUNCTURE: CPT | Mod: PO

## 2017-03-02 PROCEDURE — 85025 COMPLETE CBC W/AUTO DIFF WBC: CPT

## 2017-03-02 PROCEDURE — 83540 ASSAY OF IRON: CPT

## 2017-03-02 NOTE — TELEPHONE ENCOUNTER
----- Message from Chelsea Olivo sent at 3/2/2017  3:05 PM CST -----  Contact: seld: [phone]  Patient has an appointment set up on 3/13/17 for a hospital follow up. She has called to see if she can get this sooner because she stubbed her left foot fourth toe and now it is red and swollen. She is diabetic and concerned. Please call back if there is any sooner availability.

## 2017-03-02 NOTE — TELEPHONE ENCOUNTER
Patient is scheduled for a visit on 3.5.17 for her foot, she is keeping her hospital f/u appointment the same.

## 2017-03-03 ENCOUNTER — OUTPATIENT CASE MANAGEMENT (OUTPATIENT)
Dept: ADMINISTRATIVE | Facility: OTHER | Age: 67
End: 2017-03-03

## 2017-03-05 ENCOUNTER — TELEPHONE (OUTPATIENT)
Dept: FAMILY MEDICINE | Facility: CLINIC | Age: 67
End: 2017-03-05

## 2017-03-05 ENCOUNTER — OFFICE VISIT (OUTPATIENT)
Dept: FAMILY MEDICINE | Facility: CLINIC | Age: 67
End: 2017-03-05
Payer: MEDICARE

## 2017-03-05 VITALS
WEIGHT: 293 LBS | HEIGHT: 60 IN | SYSTOLIC BLOOD PRESSURE: 105 MMHG | DIASTOLIC BLOOD PRESSURE: 59 MMHG | BODY MASS INDEX: 57.52 KG/M2 | TEMPERATURE: 99 F | HEART RATE: 84 BPM

## 2017-03-05 DIAGNOSIS — E11.59 HYPERTENSION ASSOCIATED WITH DIABETES: ICD-10-CM

## 2017-03-05 DIAGNOSIS — I15.2 HYPERTENSION ASSOCIATED WITH DIABETES: ICD-10-CM

## 2017-03-05 DIAGNOSIS — E11.9 CONTROLLED TYPE 2 DIABETES MELLITUS WITHOUT COMPLICATION, WITHOUT LONG-TERM CURRENT USE OF INSULIN: Chronic | ICD-10-CM

## 2017-03-05 DIAGNOSIS — S30.811A ABRASION OF ABDOMINAL WALL, INITIAL ENCOUNTER: ICD-10-CM

## 2017-03-05 DIAGNOSIS — J96.11 CHRONIC RESPIRATORY FAILURE WITH HYPOXIA: ICD-10-CM

## 2017-03-05 DIAGNOSIS — S99.922A TOE INJURY, LEFT, INITIAL ENCOUNTER: Primary | ICD-10-CM

## 2017-03-05 DIAGNOSIS — F17.200 TOBACCO DEPENDENCY: ICD-10-CM

## 2017-03-05 DIAGNOSIS — E66.01 MORBID OBESITY WITH BMI OF 45.0-49.9, ADULT: ICD-10-CM

## 2017-03-05 PROCEDURE — 1160F RVW MEDS BY RX/DR IN RCRD: CPT | Mod: S$GLB,,, | Performed by: NURSE PRACTITIONER

## 2017-03-05 PROCEDURE — 3044F HG A1C LEVEL LT 7.0%: CPT | Mod: S$GLB,,, | Performed by: NURSE PRACTITIONER

## 2017-03-05 PROCEDURE — 4010F ACE/ARB THERAPY RXD/TAKEN: CPT | Mod: S$GLB,,, | Performed by: NURSE PRACTITIONER

## 2017-03-05 PROCEDURE — 99499 UNLISTED E&M SERVICE: CPT | Mod: S$GLB,,, | Performed by: NURSE PRACTITIONER

## 2017-03-05 PROCEDURE — 3078F DIAST BP <80 MM HG: CPT | Mod: S$GLB,,, | Performed by: NURSE PRACTITIONER

## 2017-03-05 PROCEDURE — 99214 OFFICE O/P EST MOD 30 MIN: CPT | Mod: S$GLB,,, | Performed by: NURSE PRACTITIONER

## 2017-03-05 PROCEDURE — 99999 PR PBB SHADOW E&M-EST. PATIENT-LVL V: CPT | Mod: PBBFAC,,, | Performed by: NURSE PRACTITIONER

## 2017-03-05 PROCEDURE — 3074F SYST BP LT 130 MM HG: CPT | Mod: S$GLB,,, | Performed by: NURSE PRACTITIONER

## 2017-03-05 NOTE — PATIENT INSTRUCTIONS
Kicking the Smoking Habit  If you smoke, quitting is one of the best changes you can make for your heart and your overall health. Your risk of heart attack goes down within one day of putting out that last cigarette. As you go longer without smoking, your risk goes down even more. Quitting isnt easy, but millions of people have done it. You can, too. Its never too late to quit.  Getting started  Boost your chances of success by deciding on your quit plan. Your health care provider and cardiac rehab team can help you develop this plan. Even if youve already quit, its easy to slip back into smoking.  Your plan can help you avoid and recover from relapse.  In any case, start by setting a date to quit within a month, and do it.    Keys to your quit plan  · Talk to your healthcare provider about prescription medicines and nicotine replacement products that help stop the urge to smoke.   · Join a support group or quit smoking program. Talking with others about the challenges of quitting can help you get through them.  · Ask other smokers in your household to quit with you.  · Look for the cues in your life that you associate with smoking and avoid them.  Track your triggers  What gives you that A-ukzv-h-cigarette feeling? List all the situations that make you want a cigarette. Then think of other ways to deal with these situations. Here are some examples:  Situation How I'll handle it   Finishing a meal Get up from the table and take a walk   Having an argument Find a quiet place and breathe deeply   Feeling lonely or bored Call a friend to talk         Tips for quitting successfully  · List the benefits of quitting such as reducing heart risks and saving money. Keep this list and review it whenever you feel like smoking.  · Get support. Let your friends know you may call them to chat when you have an urge to smoke.  · If youve tried to quit before without success, this time avoid the triggers that may cause  the relapse.  · Make the most of slip-ups. Try to learn from them, and then get back on track.  · Be accountable to your friends and your calendar so that you stay on track.  For family and friends  · Be supportive and patient. Quitting smoking can be difficult and stressful.  · If you smoke, nows a great time to quit. Even if you dont quit, never smoke around your loved one. Secondhand smoke is dangerous to his or her heart.  · The best goals are accomplished in teams. Remember that when your loved one states he or she wants to stop smoking.  Date Last Reviewed: 7/1/2016  © 1131-1826 [employer]. [address] [place] [address], [place] [address]. All rights reserved. This information is not intended as a substitute for professional medical care. Always follow your healthcare professional's instructions.

## 2017-03-05 NOTE — TELEPHONE ENCOUNTER
Please give verbal order to [facility] for wound care nurse eval and treat left lower abdomen wound.

## 2017-03-05 NOTE — MR AVS SNAPSHOT
[facility]  [address]  [place] [address]  Phone: [phone]  Fax: [phone]                  Nelly Tian   3/5/2017 9:40 AM   Office Visit    Description:  Female : 1950   Provider:  JASMIN Mohr   Department:  [place] - [facility]           Reason for Visit     Toe Pain           Diagnoses this Visit        Comments    Toe injury, left, initial encounter    -  Primary     Tobacco dependency                To Do List           Future Appointments        Provider Department Dept Phone    3/9/2017 11:00 AM MD Bonnie Nino  Hematology Oncology [phone]    3/13/2017 11:20 AM JASMIN Mohr [facility] [phone]    3/16/2017 2:00 PM [facility] [phone]    3/29/2017 11:00 AM Marlyn Moctezuma PA-C [facility] [phone]    2017 11:30 AM Constantine Bird MD [facility] [phone]      Goals (5 Years of Data)     Patient/caregiver will accept life style changes to manage and improve  CHF  prior to discharge from [facility].     Notes - Note edited  3/2/2017  1:59 PM by Jayde Reyes    Overall Time to Completion  2 months from 2017    Short Term Goals  Patient/caregiver will discuss health care needs with Physician and care team during visits or using Patient Portal.  Interventions   · Collaborate with Physician as appropriate to meet patient needs.  · Discuss appropriate use of Home health with patient/caregiver.  · Empower patient/caregiver to discuss treatment plan with Physician/care team.  · Provide contact information for [facility] contact information.  · Provide contact information for Outpatient Case Management contact information.  · Provide contact information for Physician office phone number.  · Encourage compliance with Physician follow-ups.   Status  · Met     Patient/caregiver will notify doctor if patient gains more than 3  pounds in one day or 5 pounds in one week within 72 hours.  Interventions   · Recognize and provide educational material (LESLIEMES).  · Mail Weight log for home use.   Status  · Met     Patient/caregiver will verbalize 2 food items Low Sodium within 1 month.  Interventions   · Recognize and provide educational material (KRAMES).  · Encourage Dietary Compliance.  · Review eating/nutrition habits.   Status  · Partially met    Patient/caregiver will verbalize 2 signs and symptoms of Congestive Heart Failure within 2 months.   Interventions   · Recognize and provide educational material (KRAMES).   Status  · Partially met    Patient/caregiver will verbalize understanding and will follow hospital discharge plan 1 week   Interventions   · Encourage Dietary Compliance.  · Encourage Medication Compliance.  · Encourage Smoking Cessation.   Status  · Met           COMPLETED: Patient/caregiver will have an action plan in place to manage and prevent complications of falls  prior to discharge from [facility].     Notes - Note edited  3/1/2017  1:47 PM by Jayde Reyes    Overall Time to Completion  2 months from 02/21/2017    Short Term Goals  Patient/caregiver will put in place 2 keeping room free of clutter, making sure no electrical cords placed in walk ways and making sure rooms are well lit measures to decrease the risk of patient falls within 1 month.  Interventions   · Recognize and provide educational material (KRAMES).   Status  · Met             COMPLETED: Patient/caregiver will have knowledge of resources available in order to obtain the services that are needed prior to discharge from [facility].     Notes - Note edited  3/1/2017  1:47 PM by Jayde Reyes    Overall Time to Completion  2 months from 02/21/2017    Short Term Goals  Patient/caregiver will have contact information for identified community resources IE:[employer] for eyeglasses, [facility], Food Pantries   for follow-up within 1  month.  Interventions   · Provide contact information for Community [employer] Resource Database (Aunt Christal).   Status  · Met           Patient/caregiver will have knowledge of resources available in order to obtain the services that are needed prior to discharge from [facility].     Notes - Note created  3/1/2017  1:58 PM by Jayde Reyes    Overall Time to Completion  1 month from 03/01/2017    Short Term Goals  Patient/caregiver will have contact information for identified community resources IE:[facility]  for follow-up within 2 weeks.  Interventions   · Refer to Outpatient Case Management Social Worker.   Status  · Partially met    Patient/caregiver will identify 2 supports or services to maintain or improve current functional status within 2 weeks.  Interventions   · Refer to Outpatient Case Management Social Worker.  · Provide contact information for Community [employer] Resource Database (Aunt Christal).   Status  · Partially met            [facility] On Call     [facility] On Call Nurse Care Line - 24/7 Assistance  Registered nurses in the [facility] On Call Center provide clinical advisement, health education, appointment booking, and other advisory services.  Call for this free service at [phone].             Medications           Message regarding Medications     Verify the changes and/or additions to your medication regime listed below are the same as discussed with your clinician today.  If any of these changes or additions are incorrect, please notify your healthcare provider.             Verify that the below list of medications is an accurate representation of the medications you are currently taking.  If none reported, the list may be blank. If incorrect, please contact your healthcare provider. Carry this list with you in case of emergency.           Current Medications     acetaminophen (TYLENOL) 325 MG tablet Take 2 tablets (650 mg total) by mouth every 6 (six) hours as needed.    albuterol  (ACCUNEB) 0.63 mg/3 mL Nebu INHALE THE CONTENTS OF 1 VIAL  BY  NEBULIZER EVERY 6 HOURS AS NEEDED    albuterol (VENTOLIN HFA) 90 mcg/actuation inhaler INHALE TWO PUFFS BY MOUTH EVERY 6 HOURS AS NEEDED FOR WHEEZING    albuterol-ipratropium 2.5mg-0.5mg/3mL (DUO-NEB) 0.5 mg-3 mg(2.5 mg base)/3 mL nebulizer solution INHALE THE CONTENTS OF 1 VIAL VIA NEBULIZER EVERY 6 HOURS AS NEEDED FOR  WHEEZING (DO NOT USE WITH ALBUTEROL INHALER)    ascorbic acid, vitamin C, (VITAMIN C) 500 MG tablet Take 1 tablet (500 mg total) by mouth every evening.    atorvastatin (LIPITOR) 20 MG tablet Take 1 tablet (20 mg total) by mouth once daily.    blood sugar diagnostic (TRUE METRIX GLUCOSE TEST STRIP) Strp Use to test blood sugar three times a day   DX:E11.9    blood-glucose meter (TRUE METRIX AIR GLUCOSE METER) kit Use as instructed to test blood sugar   DX:E11.9    budesonide (PULMICORT) 0.5 mg/2 mL nebulizer solution INHALE THE CONTENTS OF 1 VIAL VIA NEBULIZER EVERY DAY    clonazePAM (KLONOPIN) 1 MG tablet Take 1 tablet (1 mg total) by mouth 2 (two) times daily as needed for Anxiety.    escitalopram oxalate (LEXAPRO) 10 MG tablet TAKE 1 TABLET ONE TIME DAILY    ferrous sulfate 325 (65 FE) MG EC tablet Take 1 tablet (325 mg total) by mouth 2 (two) times daily with meals.    furosemide (LASIX) 40 MG tablet Take 1 tablet (40 mg total) by mouth 2 (two) times daily.    gabapentin (NEURONTIN) 600 MG tablet Take 1 tablet (600 mg total) by mouth 3 (three) times daily.    hydrocodone-acetaminophen 10-325mg (NORCO)  mg Tab Take 1 tablet by mouth every 8 (eight) hours as needed.    lancets (TRUEPLUS LANCETS) 33 gauge Misc 1 lancet by Misc.(Non-Drug; Combo Route) route 3 (three) times daily. To test blood sugar   DX: E11.9    levalbuterol (XOPENEX) 0.63 mg/3 mL nebulizer solution INHALE THE CONTENTS OF 1 VIAL BY NEBULIZATION 3 (THREE) TIMES DAILY.    lisinopril (PRINIVIL,ZESTRIL) 20 MG tablet TAKE 1 TABLET ONE TIME DAILY    metformin  (GLUCOPHAGE) 500 MG tablet TAKE 1 TABLET TWICE DAILY WITH MEALS    methocarbamol (ROBAXIN) 750 MG Tab TAKE 1 TABLET FOUR TIMES DAILY    metoprolol succinate (TOPROL-XL) 25 MG 24 hr tablet Take 0.5 tablets (12.5 mg total) by mouth once daily.    miconazole NITRATE 2 % (MICOTIN) 2 % top powder Apply topically 2 (two) times daily. Skin folds    multivitamin (THERAGRAN) tablet Take 1 tablet by mouth once daily.    polyethylene glycol (GLYCOLAX) 17 gram/dose powder MIX 17 GRAMS IN LIQUID AND DRINK EVERY DAY AS NEEDED    potassium chloride SA (K-DUR,KLOR-CON) 20 MEQ tablet Take 1 tablet (20 mEq total) by mouth once daily.    silver sulfADIAZINE 1% (SILVADENE) 1 % cream Apply topically 2 (two) times daily.    SPIRIVA WITH HANDIHALER 18 mcg inhalation capsule INHALE THE CONTENTS OF 1 CAPSULE EVERY DAY    temazepam (RESTORIL) 15 mg Cap TAKE 1 CAPSULE ONE TIME DAILY    trazodone (DESYREL) 100 MG tablet TAKE 1 TABLET EVERY NIGHT AS NEEDED  FOR  INSOMNIA    warfarin (COUMADIN) 5 MG tablet TAKE ONE TABLET EVERY DAY OR AS DIRECTED BY COUMADIN CLINIC           Clinical Reference Information           Your Vitals Were     BP Pulse Temp Height Weight BMI    105/59 (BP Location: Right arm, Patient Position: Sitting, BP Method: Automatic) 84 98.7 °F (37.1 °C) (Oral) 5' (1.524 m) 142.4 kg (313 lb 15 oz) 61.31 kg/m2      Blood Pressure          Most Recent Value    BP  (!)  105/59      Allergies as of 3/5/2017     Dilaudid [Hydromorphone]      Immunizations Administered on Date of Encounter - 3/5/2017     None      Instructions      Kicking the Smoking Habit  If you smoke, quitting is one of the best changes you can make for your heart and your overall health. Your risk of heart attack goes down within one day of putting out that last cigarette. As you go longer without smoking, your risk goes down even more. Quitting isnt easy, but millions of people have done it. You can, too. Its never too late to quit.  Getting started  Boost your  chances of success by deciding on your quit plan. Your health care provider and cardiac rehab team can help you develop this plan. Even if youve already quit, its easy to slip back into smoking.  Your plan can help you avoid and recover from relapse.  In any case, start by setting a date to quit within a month, and do it.    Keys to your quit plan  · Talk to your healthcare provider about prescription medicines and nicotine replacement products that help stop the urge to smoke.   · Join a support group or quit smoking program. Talking with others about the challenges of quitting can help you get through them.  · Ask other smokers in your household to quit with you.  · Look for the cues in your life that you associate with smoking and avoid them.  Track your triggers  What gives you that B-rihg-g-cigarette feeling? List all the situations that make you want a cigarette. Then think of other ways to deal with these situations. Here are some examples:  Situation How I'll handle it   Finishing a meal Get up from the table and take a walk   Having an argument Find a quiet place and breathe deeply   Feeling lonely or bored Call a friend to talk         Tips for quitting successfully  · List the benefits of quitting such as reducing heart risks and saving money. Keep this list and review it whenever you feel like smoking.  · Get support. Let your friends know you may call them to chat when you have an urge to smoke.  · If youve tried to quit before without success, this time avoid the triggers that may cause the relapse.  · Make the most of slip-ups. Try to learn from them, and then get back on track.  · Be accountable to your friends and your calendar so that you stay on track.  For family and friends  · Be supportive and patient. Quitting smoking can be difficult and stressful.  · If you smoke, nows a great time to quit. Even if you dont quit, never smoke around your loved one. Secondhand smoke is dangerous to  his or her heart.  · The best goals are accomplished in teams. Remember that when your loved one states he or she wants to stop smoking.  Date Last Reviewed: 7/1/2016 © 2000-2016 [employer]. [address] [place] [address], [place] [address]. All rights reserved. This information is not intended as a substitute for professional medical care. Always follow your healthcare professional's instructions.             Smoking Cessation     If you would like to quit smoking:   You may be eligible for free services if you are a [place] resident and started smoking cigarettes before September 1, 1988.  Call the Smoking Cessation Trust (SCT) toll free at (742) 891-2735 or (882) 973-0171.   Call 1-800-QUIT-NOW if you do not meet the above criteria.            Language Assistance Services     ATTENTION: Language assistance services are available, free of charge. Please call [phone].      ATENCIÓN: Si habla español, tiene a dimas disposición servicios gratuitos de asistencia lingüística. Llame al [phone].     CHÚ Ý: N?u b?n nói Ti?ng Vi?t, có các d?ch v? h? tr? ngôn ng? mi?n phí dành cho b?n. G?i s? [phone].         [facility] complies with applicable Federal civil rights laws and does not discriminate on the basis of race, color, national origin, age, disability, or sex.

## 2017-03-05 NOTE — PROGRESS NOTES
"Subjective:       Patient ID: Nelly Tian is a 66 y.o. female.    Chief Complaint: Toe Pain (left foot )    HPI Comments: Ms. Tian presents to the clinic today for "stubbed toe".  She states she is worried about the toe injury because of her diabetes.  She has mild pain to the left fourth toe which is getting better.  She also has an abrasion to the left lower abdomen which occurred when she scraped herself on her bed rail getting out of bed.  She is placing Mepilex over this area.  She does have home health with [facility].  She is a smoker and states she is quitting "tomorrow".   She is also obese and is currently working to lose weight.    Toe Pain    The incident occurred 12 to 24 hours ago. The incident occurred at home. The injury mechanism was a compression. The pain is present in the left toes. The quality of the pain is described as aching. The pain is at a severity of 3/10. The pain has been constant since onset. Pertinent negatives include no inability to bear weight, loss of motion, loss of sensation, muscle weakness, numbness or tingling. She reports no foreign bodies present. Nothing aggravates the symptoms. She has tried nothing for the symptoms. The treatment provided no relief.     Review of Systems   Constitutional: Negative for chills, fatigue and fever.   HENT: Negative for congestion, ear pain and sinus pressure.    Respiratory: Negative for cough and shortness of breath.    Cardiovascular: Negative for chest pain, palpitations and leg swelling.   Gastrointestinal: Negative for abdominal pain, constipation and diarrhea.   Musculoskeletal: Positive for arthralgias.   Skin: Positive for wound. Negative for rash.   Neurological: Negative for dizziness, tingling and numbness.       Objective:      Physical Exam   Constitutional: She is oriented to person, place, and time. She appears well-developed and well-nourished. No distress.   HENT:   Head: Normocephalic and atraumatic.   Right Ear: " External ear normal.   Left Ear: External ear normal.   Eyes: Conjunctivae and EOM are normal.   Cardiovascular: Normal rate and regular rhythm.    Pulses:       Dorsalis pedis pulses are 1+ on the right side, and 1+ on the left side.        Posterior tibial pulses are 1+ on the right side, and 1+ on the left side.   Pulmonary/Chest: Effort normal.   Musculoskeletal: Normal range of motion.        Right foot: There is normal range of motion and no deformity.        Left foot: There is normal range of motion and no deformity.   Left fourth toe with some bruising, not tender.  Capillary refill <3 secs.  Slightly swollen.  Full ROM.   Feet:   Right Foot:   Skin Integrity: Positive for dry skin. Negative for ulcer, blister or skin breakdown.   Left Foot:   Skin Integrity: Positive for dry skin. Negative for ulcer, blister or skin breakdown.   Neurological: She is alert and oriented to person, place, and time.   Skin: Skin is warm and dry. Abrasion noted.        Psychiatric: She has a normal mood and affect. Her behavior is normal.   Vitals reviewed.          Current Outpatient Prescriptions:     acetaminophen (TYLENOL) 325 MG tablet, Take 2 tablets (650 mg total) by mouth every 6 (six) hours as needed., Disp: , Rfl: 0    albuterol (ACCUNEB) 0.63 mg/3 mL Nebu, INHALE THE CONTENTS OF 1 VIAL  BY  NEBULIZER EVERY 6 HOURS AS NEEDED, Disp: 75 mL, Rfl: 11    albuterol (VENTOLIN HFA) 90 mcg/actuation inhaler, INHALE TWO PUFFS BY MOUTH EVERY 6 HOURS AS NEEDED FOR WHEEZING, Disp: 18 each, Rfl: 3    albuterol-ipratropium 2.5mg-0.5mg/3mL (DUO-NEB) 0.5 mg-3 mg(2.5 mg base)/3 mL nebulizer solution, INHALE THE CONTENTS OF 1 VIAL VIA NEBULIZER EVERY 6 HOURS AS NEEDED FOR  WHEEZING (DO NOT USE WITH ALBUTEROL INHALER), Disp: 90 mL, Rfl: 0    ascorbic acid, vitamin C, (VITAMIN C) 500 MG tablet, Take 1 tablet (500 mg total) by mouth every evening., Disp: , Rfl:     atorvastatin (LIPITOR) 20 MG tablet, Take 1 tablet (20 mg total) by  mouth once daily., Disp: 90 tablet, Rfl: 3    blood sugar diagnostic (TRUE METRIX GLUCOSE TEST STRIP) Strp, Use to test blood sugar three times a day   DX:E11.9, Disp: 300 strip, Rfl: 3    blood-glucose meter (TRUE METRIX AIR GLUCOSE METER) kit, Use as instructed to test blood sugar   DX:E11.9, Disp: 1 each, Rfl: 0    budesonide (PULMICORT) 0.5 mg/2 mL nebulizer solution, INHALE THE CONTENTS OF 1 VIAL VIA NEBULIZER EVERY DAY, Disp: 180 mL, Rfl: 4    clonazePAM (KLONOPIN) 1 MG tablet, Take 1 tablet (1 mg total) by mouth 2 (two) times daily as needed for Anxiety., Disp: 60 tablet, Rfl: 4    escitalopram oxalate (LEXAPRO) 10 MG tablet, TAKE 1 TABLET ONE TIME DAILY, Disp: 90 tablet, Rfl: 3    ferrous sulfate 325 (65 FE) MG EC tablet, Take 1 tablet (325 mg total) by mouth 2 (two) times daily with meals., Disp: , Rfl: 0    furosemide (LASIX) 40 MG tablet, Take 1 tablet (40 mg total) by mouth 2 (two) times daily., Disp: 60 tablet, Rfl: 0    gabapentin (NEURONTIN) 600 MG tablet, Take 1 tablet (600 mg total) by mouth 3 (three) times daily. (Patient taking differently: Take 600 mg by mouth 2 (two) times daily. ), Disp: 270 tablet, Rfl: 3    hydrocodone-acetaminophen 10-325mg (NORCO)  mg Tab, Take 1 tablet by mouth every 8 (eight) hours as needed. (Patient taking differently: Take 1 tablet by mouth every 8 (eight) hours as needed for Pain. ), Disp: 90 tablet, Rfl: 0    lancets (TRUEPLUS LANCETS) 33 gauge Misc, 1 lancet by Misc.(Non-Drug; Combo Route) route 3 (three) times daily. To test blood sugar   DX: E11.9, Disp: 300 each, Rfl: 3    levalbuterol (XOPENEX) 0.63 mg/3 mL nebulizer solution, INHALE THE CONTENTS OF 1 VIAL BY NEBULIZATION 3 (THREE) TIMES DAILY., Disp: 792 mL, Rfl: 3    lisinopril (PRINIVIL,ZESTRIL) 20 MG tablet, TAKE 1 TABLET ONE TIME DAILY, Disp: 90 tablet, Rfl: 1    metformin (GLUCOPHAGE) 500 MG tablet, TAKE 1 TABLET TWICE DAILY WITH MEALS, Disp: 180 tablet, Rfl: 3    methocarbamol  (ROBAXIN) 750 MG Tab, TAKE 1 TABLET FOUR TIMES DAILY, Disp: 360 tablet, Rfl: 1    metoprolol succinate (TOPROL-XL) 25 MG 24 hr tablet, Take 0.5 tablets (12.5 mg total) by mouth once daily. (Patient taking differently: Take 12.5 mg by mouth once daily. On hold waiting for dr visit), Disp: 45 tablet, Rfl: 3    miconazole NITRATE 2 % (MICOTIN) 2 % top powder, Apply topically 2 (two) times daily. Skin folds, Disp: , Rfl: 0    multivitamin (THERAGRAN) tablet, Take 1 tablet by mouth once daily., Disp: , Rfl:     polyethylene glycol (GLYCOLAX) 17 gram/dose powder, MIX 17 GRAMS IN LIQUID AND DRINK EVERY DAY AS NEEDED, Disp: 1530 g, Rfl: 11    potassium chloride SA (K-DUR,KLOR-CON) 20 MEQ tablet, Take 1 tablet (20 mEq total) by mouth once daily., Disp: 30 tablet, Rfl: 1    silver sulfADIAZINE 1% (SILVADENE) 1 % cream, Apply topically 2 (two) times daily., Disp: 85 g, Rfl: 0    SPIRIVA WITH HANDIHALER 18 mcg inhalation capsule, INHALE THE CONTENTS OF 1 CAPSULE EVERY DAY, Disp: 90 capsule, Rfl: 1    temazepam (RESTORIL) 15 mg Cap, TAKE 1 CAPSULE ONE TIME DAILY, Disp: 90 capsule, Rfl: 3    trazodone (DESYREL) 100 MG tablet, TAKE 1 TABLET EVERY NIGHT AS NEEDED  FOR  INSOMNIA, Disp: 90 tablet, Rfl: 4    warfarin (COUMADIN) 5 MG tablet, TAKE ONE TABLET EVERY DAY OR AS DIRECTED BY COUMADIN CLINIC (Patient taking differently: TAKE ONE TABLET EVERY DAY OR AS DIRECTED BY COUMADIN CLINIC /       7.5 mg Sat and Wed /      5 mg Mon, Tues, Thurs, Fri, Sun), Disp: 90 tablet, Rfl: 1  Assessment:       1. Toe injury, left, initial encounter    2. Tobacco dependency    3. Chronic respiratory failure with hypoxia    4. Morbid obesity with BMI of 45.0-49.9, adult    5. Controlled type 2 diabetes mellitus without complication, without long-term current use of insulin    6. Hypertension associated with diabetes    7. Abrasion of abdominal wall, initial encounter        Plan:       Toe injury, left, initial encounter  Doubt fracture due  to no tenderness on exam.    Apply ice for pain and swelling.  May take Tylenol for pain.    Tobacco dependency  Quitting tomorrow.    Chronic respiratory failure with hypoxia  Quit smoking.  On oxygen.    Morbid obesity with BMI of 45.0-49.9, adult    Controlled type 2 diabetes mellitus without complication, without long-term current use of insulin    Hypertension associated with diabetes    Abrasion of abdominal wall, initial encounter  Apply silvadene daily (patient has some at home already).   Continue using Mepilex.  Will order wound care nurse to evaluate and treat at home.    Patient readiness: acceptance and barriers:none    During the course of the visit the patient was educated and counseled about the following:     Diabetes:  Discussed general issues about diabetes pathophysiology and management.  Hypertension:   Medication: no change.  Obesity:   General weight loss/lifestyle modification strategies discussed (elicit support from others; identify saboteurs; non-food rewards, etc).    Goals: Diabetes: Maintain Hemoglobin A1C below 7, Hypertension: Reduce Blood Pressure and Obesity: Reduce calorie intake and BMI    Did patient meet goals/outcomes: No    The following self management tools provided: declined    Patient Instructions (the written plan) was given to the patient/family.     Time spent with patient: 15 minutes

## 2017-03-06 ENCOUNTER — OUTPATIENT CASE MANAGEMENT (OUTPATIENT)
Dept: ADMINISTRATIVE | Facility: OTHER | Age: 67
End: 2017-03-06

## 2017-03-06 RX ORDER — AMOXICILLIN AND CLAVULANATE POTASSIUM 875; 125 MG/1; MG/1
TABLET, FILM COATED ORAL
Qty: 20 TABLET | Refills: 0 | OUTPATIENT
Start: 2017-03-06

## 2017-03-06 RX ORDER — MELOXICAM 7.5 MG/1
TABLET ORAL
Qty: 45 TABLET | Refills: 1 | Status: ON HOLD | OUTPATIENT
Start: 2017-03-06 | End: 2017-03-18

## 2017-03-06 NOTE — PROGRESS NOTES
"3/6/17: LCSW contacted pt to complete SW assessment.  This is a 67 yo female pt who lives with her daughter and grandchild.  Pt is a  and her late  served in the [employer]; they have not accessed [facility].  Pt has a hx of major depression chronic and anxiety.  Pt self-reports her depression coming as a result of losing some functionality approximately six years ago.  Pt states how she used to be someone who was "constantly on the go, and to not be able to do that anymore has caused me to have depression." Pt is not currently receiving counseling or therapy for depression but she does take Lexapro as prescribed.  Pt self-reports being able to perform most of her ADL's but she does occasionally receive assistance from her daughter and grandchild.      [facility], left voicemail.  "

## 2017-03-06 NOTE — TELEPHONE ENCOUNTER
Called [facility] and orders given for nurse wound care eval and to treat left lower abd wound

## 2017-03-08 ENCOUNTER — LAB VISIT (OUTPATIENT)
Dept: LAB | Facility: HOSPITAL | Age: 67
End: 2017-03-08
Attending: FAMILY MEDICINE
Payer: MEDICARE

## 2017-03-08 ENCOUNTER — ANTI-COAG VISIT (OUTPATIENT)
Dept: CARDIOLOGY | Facility: CLINIC | Age: 67
End: 2017-03-08

## 2017-03-08 ENCOUNTER — TELEPHONE (OUTPATIENT)
Dept: FAMILY MEDICINE | Facility: CLINIC | Age: 67
End: 2017-03-08

## 2017-03-08 DIAGNOSIS — Z79.01 LONG TERM CURRENT USE OF ANTICOAGULANT THERAPY: ICD-10-CM

## 2017-03-08 DIAGNOSIS — I50.43 ACUTE ON CHRONIC COMBINED SYSTOLIC AND DIASTOLIC HEART FAILURE: Primary | ICD-10-CM

## 2017-03-08 LAB
ANION GAP SERPL CALC-SCNC: 11 MMOL/L
BUN SERPL-MCNC: 15 MG/DL
CALCIUM SERPL-MCNC: 9.7 MG/DL
CHLORIDE SERPL-SCNC: 103 MMOL/L
CO2 SERPL-SCNC: 28 MMOL/L
CREAT SERPL-MCNC: 0.7 MG/DL
EST. GFR  (AFRICAN AMERICAN): >60 ML/MIN/1.73 M^2
EST. GFR  (NON AFRICAN AMERICAN): >60 ML/MIN/1.73 M^2
GLUCOSE SERPL-MCNC: 64 MG/DL
INR PPP: 2.5
POTASSIUM SERPL-SCNC: 3.9 MMOL/L
SODIUM SERPL-SCNC: 142 MMOL/L

## 2017-03-08 PROCEDURE — 36415 COLL VENOUS BLD VENIPUNCTURE: CPT

## 2017-03-08 PROCEDURE — 80048 BASIC METABOLIC PNL TOTAL CA: CPT

## 2017-03-08 NOTE — TELEPHONE ENCOUNTER
----- Message from Joaquina Zuleta sent at 3/8/2017  3:37 PM CST -----  Contact: [facility]   Chiquis   Wound  L thigh,   Call back

## 2017-03-09 ENCOUNTER — OFFICE VISIT (OUTPATIENT)
Dept: HEMATOLOGY/ONCOLOGY | Facility: CLINIC | Age: 67
End: 2017-03-09
Payer: MEDICARE

## 2017-03-09 ENCOUNTER — OFFICE VISIT (OUTPATIENT)
Dept: FAMILY MEDICINE | Facility: CLINIC | Age: 67
End: 2017-03-09
Payer: MEDICARE

## 2017-03-09 VITALS
WEIGHT: 293 LBS | HEIGHT: 68 IN | DIASTOLIC BLOOD PRESSURE: 59 MMHG | RESPIRATION RATE: 18 BRPM | SYSTOLIC BLOOD PRESSURE: 122 MMHG | HEART RATE: 95 BPM | BODY MASS INDEX: 44.41 KG/M2 | TEMPERATURE: 98 F

## 2017-03-09 VITALS
TEMPERATURE: 98 F | WEIGHT: 293 LBS | HEIGHT: 68 IN | SYSTOLIC BLOOD PRESSURE: 118 MMHG | HEART RATE: 89 BPM | DIASTOLIC BLOOD PRESSURE: 71 MMHG | BODY MASS INDEX: 44.41 KG/M2

## 2017-03-09 DIAGNOSIS — K43.9 VENTRAL HERNIA WITHOUT OBSTRUCTION OR GANGRENE: ICD-10-CM

## 2017-03-09 DIAGNOSIS — L89.93 DECUBITUS ULCER, INFECTED, STAGE III: Primary | ICD-10-CM

## 2017-03-09 DIAGNOSIS — L08.9 DECUBITUS ULCER, INFECTED, STAGE III: Primary | ICD-10-CM

## 2017-03-09 DIAGNOSIS — M51.36 LUMBAR DEGENERATIVE DISC DISEASE: ICD-10-CM

## 2017-03-09 DIAGNOSIS — Z86.2 HISTORY OF ANEMIA: ICD-10-CM

## 2017-03-09 DIAGNOSIS — E61.1 IRON DEFICIENCY: Primary | ICD-10-CM

## 2017-03-09 DIAGNOSIS — M43.12 SPONDYLOLISTHESIS OF CERVICAL REGION: ICD-10-CM

## 2017-03-09 PROCEDURE — 96372 THER/PROPH/DIAG INJ SC/IM: CPT | Mod: S$GLB,,, | Performed by: FAMILY MEDICINE

## 2017-03-09 PROCEDURE — 3074F SYST BP LT 130 MM HG: CPT | Mod: S$GLB,,, | Performed by: INTERNAL MEDICINE

## 2017-03-09 PROCEDURE — 99214 OFFICE O/P EST MOD 30 MIN: CPT | Mod: 25,S$GLB,, | Performed by: FAMILY MEDICINE

## 2017-03-09 PROCEDURE — 1160F RVW MEDS BY RX/DR IN RCRD: CPT | Mod: S$GLB,,, | Performed by: INTERNAL MEDICINE

## 2017-03-09 PROCEDURE — 99499 UNLISTED E&M SERVICE: CPT | Mod: S$GLB,,, | Performed by: INTERNAL MEDICINE

## 2017-03-09 PROCEDURE — 1160F RVW MEDS BY RX/DR IN RCRD: CPT | Mod: S$GLB,,, | Performed by: FAMILY MEDICINE

## 2017-03-09 PROCEDURE — 3078F DIAST BP <80 MM HG: CPT | Mod: S$GLB,,, | Performed by: INTERNAL MEDICINE

## 2017-03-09 PROCEDURE — 3074F SYST BP LT 130 MM HG: CPT | Mod: S$GLB,,, | Performed by: FAMILY MEDICINE

## 2017-03-09 PROCEDURE — 3078F DIAST BP <80 MM HG: CPT | Mod: S$GLB,,, | Performed by: FAMILY MEDICINE

## 2017-03-09 PROCEDURE — 99213 OFFICE O/P EST LOW 20 MIN: CPT | Mod: S$GLB,,, | Performed by: INTERNAL MEDICINE

## 2017-03-09 PROCEDURE — 99499 UNLISTED E&M SERVICE: CPT | Mod: S$GLB,,, | Performed by: FAMILY MEDICINE

## 2017-03-09 PROCEDURE — 99999 PR PBB SHADOW E&M-EST. PATIENT-LVL III: CPT | Mod: PBBFAC,,, | Performed by: FAMILY MEDICINE

## 2017-03-09 PROCEDURE — 99999 PR PBB SHADOW E&M-EST. PATIENT-LVL III: CPT | Mod: PBBFAC,,, | Performed by: INTERNAL MEDICINE

## 2017-03-09 RX ORDER — FLUCONAZOLE 100 MG/1
100 TABLET ORAL DAILY
Qty: 3 TABLET | Refills: 0 | Status: SHIPPED | OUTPATIENT
Start: 2017-03-09 | End: 2017-03-12

## 2017-03-09 RX ORDER — AMOXICILLIN AND CLAVULANATE POTASSIUM 500; 125 MG/1; MG/1
1 TABLET, FILM COATED ORAL 3 TIMES DAILY
Qty: 30 TABLET | Refills: 0 | Status: SHIPPED | OUTPATIENT
Start: 2017-03-09 | End: 2017-03-10

## 2017-03-09 RX ORDER — HYDROCODONE BITARTRATE AND ACETAMINOPHEN 10; 325 MG/1; MG/1
1 TABLET ORAL EVERY 6 HOURS PRN
Qty: 120 TABLET | Refills: 0 | Status: SHIPPED | OUTPATIENT
Start: 2017-03-09 | End: 2017-04-12 | Stop reason: SDUPTHER

## 2017-03-09 RX ORDER — KETOROLAC TROMETHAMINE 30 MG/ML
30 INJECTION, SOLUTION INTRAMUSCULAR; INTRAVENOUS
Status: COMPLETED | OUTPATIENT
Start: 2017-03-09 | End: 2017-03-09

## 2017-03-09 RX ORDER — TRAZODONE HYDROCHLORIDE 150 MG/1
150 TABLET ORAL NIGHTLY
Qty: 90 TABLET | Refills: 0 | Status: SHIPPED | OUTPATIENT
Start: 2017-03-09 | End: 2017-04-05

## 2017-03-09 RX ORDER — CEFTRIAXONE 500 MG/1
500 INJECTION, POWDER, FOR SOLUTION INTRAMUSCULAR; INTRAVENOUS
Status: COMPLETED | OUTPATIENT
Start: 2017-03-09 | End: 2017-03-09

## 2017-03-09 RX ADMIN — KETOROLAC TROMETHAMINE 30 MG: 30 INJECTION, SOLUTION INTRAMUSCULAR; INTRAVENOUS at 05:03

## 2017-03-09 RX ADMIN — CEFTRIAXONE 500 MG: 500 INJECTION, POWDER, FOR SOLUTION INTRAMUSCULAR; INTRAVENOUS at 05:03

## 2017-03-09 NOTE — PROGRESS NOTES
2 identifiers, name and , used to confirm patient identity.  Procedure was explained and patient verbalized understanding. Ceftriaxone 500 mg given IM in the left upper outer quadrant of the gluteus and ketorolac 30 mg given IM in the left upper outer quadrant of the gluteus both using sterile technique. Patient tolerated procedure well. No residual bleeding noted at the injection site.

## 2017-03-09 NOTE — MR AVS SNAPSHOT
[facility]  [address] [place] [address] Drive [address]  [place] [address]  Phone: [phone]                  Nelly Tian   3/9/2017 11:00 AM   Office Visit    Description:  Female : 1950   Provider:  Laura Ramos MD   Department:  [facility]           Reason for Visit     Follow-up     Results           Diagnoses this Visit        Comments    Iron deficiency    -  Primary     History of anemia                To Do List           Future Appointments        Provider Department Dept Phone    3/9/2017 3:30 PM Constantine Bird MD [facility] [phone]    3/13/2017 11:20 AM DARIEN Mohr-C [facility] [phone]    3/16/2017 2:00 PM Chu Mack MD [facility] [phone]    3/29/2017 11:00 AM Marlyn Moctezuma PA-C [facility] [phone]    2017 11:30 AM Constantine Bird MD [facility] [phone]      Goals (5 Years of Data)     Patient/caregiver will accept life style changes to manage and improve  CHF  prior to discharge from [facility].     Notes - Note edited  3/2/2017  1:59 PM by Jayde Reyes    Overall Time to Completion  2 months from 2017    Short Term Goals  Patient/caregiver will discuss health care needs with Physician and care team during visits or using Patient Portal.  Interventions   · Collaborate with Physician as appropriate to meet patient needs.  · Discuss appropriate use of Home health with patient/caregiver.  · Empower patient/caregiver to discuss treatment plan with Physician/care team.  · Provide contact information for [facility] contact information.  · Provide contact information for Outpatient Case Management contact information.  · Provide contact information for Physician office phone number.  · Encourage compliance with Physician follow-ups.   Status  · Met     Patient/caregiver will notify doctor if patient gains more than 3 pounds in one day  or 5 pounds in one week within 72 hours.  Interventions   · Recognize and provide educational material (SLIME).  · Mail Weight log for home use.   Status  · Met     Patient/caregiver will verbalize 2 food items Low Sodium within 1 month.  Interventions   · Recognize and provide educational material (SLIME).  · Encourage Dietary Compliance.  · Review eating/nutrition habits.   Status  · Partially met    Patient/caregiver will verbalize 2 signs and symptoms of Congestive Heart Failure within 2 months.   Interventions   · Recognize and provide educational material (SLIME).   Status  · Partially met    Patient/caregiver will verbalize understanding and will follow hospital discharge plan 1 week   Interventions   · Encourage Dietary Compliance.  · Encourage Medication Compliance.  · Encourage Smoking Cessation.   Status  · Met           Patient/caregiver will have adequate mental health support/resources prior to discharge from [facility].     Notes - Note created  3/6/2017  9:42 AM by Chu Desai III, LCSW    Overall Time to Completion  2 months from 03/06/2017    Short Term Goals  Patient/caregiver will contact [employer] for guidance within 1 month.  Interventions   · Collaborate with external provider as appropriate to meet patient needs.   · Coordinate mental health services.  · Encourage communication with providers.  · Encourage compliance with medical/mental health appointments.  · Encourage family/social  and involvement in care.  · Facilitate referrals as appropriate.  · Provide crisis intervention hotline.  · Provide information on [employer].  · Provide mental/behavioral health resource(s).  · Provide support group information.  · Provide supportive counseling.   Status  · Partially met            COMPLETED: Patient/caregiver will have an action plan in place to manage and prevent complications of falls  prior to discharge from [facility].     Notes - Note edited  3/1/2017   1:47 PM by Jayde Reyes    Overall Time to Completion  2 months from 02/21/2017    Short Term Goals  Patient/caregiver will put in place 2 keeping room free of clutter, making sure no electrical cords placed in walk ways and making sure rooms are well lit measures to decrease the risk of patient falls within 1 month.  Interventions   · Recognize and provide educational material (SLIME).   Status  · Met             COMPLETED: Patient/caregiver will have knowledge of resources available in order to obtain the services that are needed prior to discharge from [facility].     Notes - Note edited  3/1/2017  1:47 PM by Jayde Reyes    Overall Time to Completion  2 months from 02/21/2017    Short Term Goals  Patient/caregiver will have contact information for identified community resources IE:[employer] for eyeglasses, [facility], Food Pantries   for follow-up within 1 month.  Interventions   · Provide contact information for Community Connections Resource Database (Aunt Christal).   Status  · Met           Patient/caregiver will have knowledge of resources available in order to obtain the services that are needed prior to discharge from [facility].     Notes - Note created  3/1/2017  1:58 PM by Jayde Reyes    Overall Time to Completion  1 month from 03/01/2017    Short Term Goals  Patient/caregiver will have contact information for identified community resources IE:[facility]  for follow-up within 2 weeks.  Interventions   · Refer to Outpatient Case Management Social Worker.   Status  · Partially met    Patient/caregiver will identify 2 supports or services to maintain or improve current functional status within 2 weeks.  Interventions   · Refer to Outpatient Case Management Social Worker.  · Provide contact information for Community Connections Resource Database ( [employer]).   Status  · Partially met            [facility]     [facility] Nurse Care Line - 24/7 Assistance  Registered nurses  in the [facility] provide clinical advisement, health education, appointment booking, and other advisory services.  Call for this free service at [phone].             Medications           Message regarding Medications     Verify the changes and/or additions to your medication regime listed below are the same as discussed with your clinician today.  If any of these changes or additions are incorrect, please notify your healthcare provider.             Verify that the below list of medications is an accurate representation of the medications you are currently taking.  If none reported, the list may be blank. If incorrect, please contact your healthcare provider. Carry this list with you in case of emergency.           Current Medications     acetaminophen (TYLENOL) 325 MG tablet Take 2 tablets (650 mg total) by mouth every 6 (six) hours as needed.    albuterol (ACCUNEB) 0.63 mg/3 mL Nebu INHALE THE CONTENTS OF 1 VIAL  BY  NEBULIZER EVERY 6 HOURS AS NEEDED    albuterol (VENTOLIN HFA) 90 mcg/actuation inhaler INHALE TWO PUFFS BY MOUTH EVERY 6 HOURS AS NEEDED FOR WHEEZING    albuterol-ipratropium 2.5mg-0.5mg/3mL (DUO-NEB) 0.5 mg-3 mg(2.5 mg base)/3 mL nebulizer solution INHALE THE CONTENTS OF 1 VIAL VIA NEBULIZER EVERY 6 HOURS AS NEEDED FOR  WHEEZING (DO NOT USE WITH ALBUTEROL INHALER)    ascorbic acid, vitamin C, (VITAMIN C) 500 MG tablet Take 1 tablet (500 mg total) by mouth every evening.    atorvastatin (LIPITOR) 20 MG tablet Take 1 tablet (20 mg total) by mouth once daily.    blood sugar diagnostic (TRUE METRIX GLUCOSE TEST STRIP) Strp Use to test blood sugar three times a day   DX:E11.9    blood-glucose meter (TRUE METRIX AIR GLUCOSE METER) kit Use as instructed to test blood sugar   DX:E11.9    budesonide (PULMICORT) 0.5 mg/2 mL nebulizer solution INHALE THE CONTENTS OF 1 VIAL VIA NEBULIZER EVERY DAY    clonazePAM (KLONOPIN) 1 MG tablet Take 1 tablet (1 mg total) by mouth 2 (two) times daily as  needed for Anxiety.    escitalopram oxalate (LEXAPRO) 10 MG tablet TAKE 1 TABLET ONE TIME DAILY    ferrous sulfate 325 (65 FE) MG EC tablet Take 1 tablet (325 mg total) by mouth 2 (two) times daily with meals.    furosemide (LASIX) 40 MG tablet Take 1 tablet (40 mg total) by mouth 2 (two) times daily.    gabapentin (NEURONTIN) 600 MG tablet Take 1 tablet (600 mg total) by mouth 3 (three) times daily.    hydrocodone-acetaminophen 10-325mg (NORCO)  mg Tab Take 1 tablet by mouth every 8 (eight) hours as needed.    lancets (TRUEPLUS LANCETS) 33 gauge Misc 1 lancet by Misc.(Non-Drug; Combo Route) route 3 (three) times daily. To test blood sugar   DX: E11.9    levalbuterol (XOPENEX) 0.63 mg/3 mL nebulizer solution INHALE THE CONTENTS OF 1 VIAL BY NEBULIZATION 3 (THREE) TIMES DAILY.    lisinopril (PRINIVIL,ZESTRIL) 20 MG tablet TAKE 1 TABLET ONE TIME DAILY    meloxicam (MOBIC) 7.5 MG tablet TAKE 1 TABLET EVERY DAY    metformin (GLUCOPHAGE) 500 MG tablet TAKE 1 TABLET TWICE DAILY WITH MEALS    methocarbamol (ROBAXIN) 750 MG Tab TAKE 1 TABLET FOUR TIMES DAILY    metoprolol succinate (TOPROL-XL) 25 MG 24 hr tablet Take 0.5 tablets (12.5 mg total) by mouth once daily.    miconazole NITRATE 2 % (MICOTIN) 2 % top powder Apply topically 2 (two) times daily. Skin folds    multivitamin (THERAGRAN) tablet Take 1 tablet by mouth once daily.    polyethylene glycol (GLYCOLAX) 17 gram/dose powder MIX 17 GRAMS IN LIQUID AND DRINK EVERY DAY AS NEEDED    potassium chloride SA (K-DUR,KLOR-CON) 20 MEQ tablet Take 1 tablet (20 mEq total) by mouth once daily.    silver sulfADIAZINE 1% (SILVADENE) 1 % cream Apply topically 2 (two) times daily.    SPIRIVA WITH HANDIHALER 18 mcg inhalation capsule INHALE THE CONTENTS OF 1 CAPSULE EVERY DAY    temazepam (RESTORIL) 15 mg Cap TAKE 1 CAPSULE ONE TIME DAILY    trazodone (DESYREL) 100 MG tablet TAKE 1 TABLET EVERY NIGHT AS NEEDED  FOR  INSOMNIA    warfarin (COUMADIN) 5 MG tablet TAKE ONE TABLET  "EVERY DAY OR AS DIRECTED BY COUMADIN CLINIC           Clinical Reference Information           Your Vitals Were     BP Pulse Temp Resp Height Weight    122/59 95 98.2 °F (36.8 °C) 18 5' 8" (1.727 m) 146.7 kg (323 lb 6.6 oz)    BMI                49.18 kg/m2          Blood Pressure          Most Recent Value    BP  (!)  122/59      Allergies as of 3/9/2017     Dilaudid [Hydromorphone]      Immunizations Administered on Date of Encounter - 3/9/2017     None      Orders Placed During Today's Visit     Future Labs/Procedures Expected by Expires    CBC auto differential  3/9/2017 5/8/2018    Iron and TIBC  3/9/2017 5/8/2018      Smoking Cessation     If you would like to quit smoking:   You may be eligible for free services if you are a [place] resident and started smoking cigarettes before September 1, 1988.  Call the Smoking Cessation [employer] ([employer]) toll free at (008) 444-8681 or (779) 283-0990.   Call 1-800-QUIT-NOW if you do not meet the above criteria.            Language Assistance Services     ATTENTION: Language assistance services are available, free of charge. Please call [phone].      ATENCIÓN: Si habla español, tiene a dimas disposición servicios gratuitos de asistencia lingüística. Llame al [phone].     CHÚ Ý: N?u b?n nói Ti?ng Vi?t, có các d?ch v? h? tr? ngôn ng? mi?n phí dành cho b?n. G?i s? [phone].         [facility] complies with applicable Federal civil rights laws and does not discriminate on the basis of race, color, national origin, age, disability, or sex.        "

## 2017-03-09 NOTE — PROGRESS NOTES
"FOLLOWUP    CHIEF COMPLAINT:  "I think I lost weight"      Ms. Tian is a 66-year-old female who has a history of progressive anemia after    IV iron.  She is here to undergo lab reevaluation as routine maintenance      She is tolerating Glucophage for diabetes as well as Lexapro for depression and Zestril   for hypertension.  No change in past medical history or medications.    Past Surgical History:   Procedure Laterality Date    CATARACT EXTRACTION Left     OS      SECTION      x2    EYE SURGERY      HYSTERECTOMY      OOPHORECTOMY      one ovary conserved    RETINAL DETACHMENT SURGERY      buckle --OS       REVIEW OF SYSTEMS:  GENERAL:  No progression of fatigue nor SOB  She is obese.  Difficulty   walking, extreme knee pain.  Gait instability only due to knees   Depression.  No fever or   chills.  No unexpected change in weight.  HEENT:  No photophobia, rhinorrhea, sinus congestion, tinnitus, gingival   bleeding, oral ulcers, or sore throat.  RESPIRATORY:  No cough or wheezing.  Intermittent shortness of breath especially on exertion  CARDIOVASCULAR:  No chest pain, palpitations, or swelling of the lower   extremities.  GASTROINTESTINAL:  No abdominal pain, dysphagia, emesis, diarrhea, or   constipation.  No heartburn, abdominal distention, melena, or hematochezia.  GENITOURINARY:  No urinary frequency, hesitancy, dysuria, or hematuria.  RHEUM:  No arthralgias or joint swelling.  MUSCOLSKELETAL:  No neck pain, back pain, positive weakness.  NEUROLOGICAL:  No headaches, positive dizziness, paresthesias, no change in memory.  PSYCH:  No agitation, change in behavior, or anxiety.  ENDOCRINE:  No hot or cold intolerance.    PHYSICAL EXAMINATION:  BP (!) 122/59  Pulse 95  Temp 98.2 °F (36.8 °C)  Resp 18  Ht 5' 8" (1.727 m)  Wt (!) 146.7 kg (323 lb 6.6 oz)  BMI 49.18 kg/m2    GENERAL:  She is obese.  She is oriented, well developed, well nourished.  PSYCH:  Pleasant affect.  No anxiety or " depression.  HEENT:  Normocephalic.  Lids intact, conjunctivae pink.  Sinuses nontender to   palpation.  OP clear, no palatal pallor.  NECK:  Supple.  Trachea midline.  No palpable abnormalities.  CHEST:  No use of accessory muscles during respiration.  ABDOMEN:  NT, ND.  Normal bowel sounds.  No palpable HSM or mass.  EXTREMITIES:  Legs, 1+ pitting edema.  Evidence of chronic venous insufficiency stable  NEUROLOGICAL:  Alert and oriented x 3.  Cranial nerves grossly intact.  SKIN:  Warm, dry.  Ecchymosis evident.  No tenting, petechiae    LABS:      Lab Results   Component Value Date    WBC 10.00 03/02/2017    HGB 13.5 03/02/2017    HCT 46.3 03/02/2017    MCV 89 03/02/2017     03/02/2017       IMPRESSION AND PLAN:        Iron deficiency  -     CBC auto differential; Future; Expected date: 3/9/17  -     Iron and TIBC; Future; Expected date: 3/9/17    History of anemia  -     CBC auto differential; Future; Expected date: 3/9/17        rtc 3 months with iron and cbc  Iron levels trending downward   May need IV iron in the near future  SHe will call if she develops fatigue and chest pain or shortness of breath   explained these symptoms can be due to severe anemia  Recheck iron studies and CBC in approximately 3 months make sure patient does not need further IV iron therapy.  Currently her levels are stable and she does not require any intervention for such

## 2017-03-09 NOTE — MR AVS SNAPSHOT
[facility]  [address]  [place] [address]  Phone: [phone]  Fax: [phone]                  Nelly Tian   3/9/2017 3:30 PM   Office Visit    Description:  Female : 1950   Provider:  Constantine Bird MD   Department:  [place] [facility]           Reason for Visit     Wound Infection           Diagnoses this Visit        Comments    Decubitus ulcer, infected, stage III    -  Primary     Lumbar degenerative disc disease         Spondylolisthesis of cervical region                To Do List           Future Appointments        Provider Department Dept Phone    3/13/2017 11:20 AM DARIEN Mohr-C [facility] [phone]    3/16/2017 2:00 PM Chu Mack MD [facility] - Cardiology [phone]    3/29/2017 11:00 AM Marlyn Moctezuma PA-C [facility] [phone]    2017 9:20 AM [facility] [phone]    2017 1:20 PM Laura Ramos MD [facility] Hematology Oncology [phone]      Goals (5 Years of Data)     Patient/caregiver will accept life style changes to manage and improve  CHF  prior to discharge from [facility].     Notes - Note edited  3/2/2017  1:59 PM by Jayde Reyes    Overall Time to Completion  2 months from 2017    Short Term Goals  Patient/caregiver will discuss health care needs with Physician and care team during visits or using Patient Portal.  Interventions   · Collaborate with Physician as appropriate to meet patient needs.  · Discuss appropriate use of Home health with patient/caregiver.  · Empower patient/caregiver to discuss treatment plan with Physician/care team.  · Provide contact information for [facility] contact information.  · Provide contact information for Outpatient Case Management contact information.  · Provide contact information for Physician office phone number.  · Encourage compliance with Physician follow-ups.   Status  · Met      Patient/caregiver will notify doctor if patient gains more than 3 pounds in one day or 5 pounds in one week within 72 hours.  Interventions   · Recognize and provide educational material (SLIME).  · Mail Weight log for home use.   Status  · Met     Patient/caregiver will verbalize 2 food items Low Sodium within 1 month.  Interventions   · Recognize and provide educational material (SLIME).  · Encourage Dietary Compliance.  · Review eating/nutrition habits.   Status  · Partially met    Patient/caregiver will verbalize 2 signs and symptoms of Congestive Heart Failure within 2 months.   Interventions   · Recognize and provide educational material (SLIME).   Status  · Partially met    Patient/caregiver will verbalize understanding and will follow hospital discharge plan 1 week   Interventions   · Encourage Dietary Compliance.  · Encourage Medication Compliance.  · Encourage Smoking Cessation.   Status  · Met           Patient/caregiver will have adequate mental health support/resources prior to discharge from [facility].     Notes - Note created  3/6/2017  9:42 AM by Chu Desai III, LCSW    Overall Time to Completion  2 months from 03/06/2017    Short Term Goals  Patient/caregiver will contact [employer] for guidance within 1 month.  Interventions   · Collaborate with external provider as appropriate to meet patient needs.   · Coordinate mental health services.  · Encourage communication with providers.  · Encourage compliance with medical/mental health appointments.  · Encourage family/social  and involvement in care.  · Facilitate referrals as appropriate.  · Provide crisis intervention hotline.  · Provide information on [employer].  · Provide mental/behavioral health resource(s).  · Provide support group information.  · Provide supportive counseling.   Status  · Partially met            COMPLETED: Patient/caregiver will have an action plan in place to manage and prevent  complications of falls  prior to discharge from [facility].     Notes - Note edited  3/1/2017  1:47 PM by Jayde Reyes    Overall Time to Completion  2 months from 02/21/2017    Short Term Goals  Patient/caregiver will put in place 2 keeping room free of clutter, making sure no electrical cords placed in walk ways and making sure rooms are well lit measures to decrease the risk of patient falls within 1 month.  Interventions   · Recognize and provide educational material (SLIME).   Status  · Met             COMPLETED: Patient/caregiver will have knowledge of resources available in order to obtain the services that are needed prior to discharge from [facility].     Notes - Note edited  3/1/2017  1:47 PM by Jayde Reyes    Overall Time to Completion  2 months from 02/21/2017    Short Term Goals  Patient/caregiver will have contact information for identified community resources IE:[employer] for eyeglasses, [facility], Food Pantries   for follow-up within 1 month.  Interventions   · Provide contact information for [facility] [place] [facility] ( [place]).   Status  · Met           Patient/caregiver will have knowledge of resources available in order to obtain the services that are needed prior to discharge from [facility].     Notes - Note created  3/1/2017  1:58 PM by Jayde Reyes    Overall Time to Completion  1 month from 03/01/2017    Short Term Goals  Patient/caregiver will have contact information for identified community resources IE:[facility]  for follow-up within 2 weeks.  Interventions   · Refer to Outpatient Case Management Social Worker.   Status  · Partially met    Patient/caregiver will identify 2 supports or services to maintain or improve current functional status within 2 weeks.  Interventions   · Refer to Outpatient Case Management Social Worker.  · Provide contact information for [facility] [place] [facility] ( [place]).   Status  · Partially met              These Medications        Disp Refills Start End    trazodone (DESYREL) 150 MG tablet 90 tablet 0 3/9/2017     Take 1 tablet (150 mg total) by mouth every evening. - Oral    Pharmacy: [facility] [address] Ph #: [phone]       amoxicillin-clavulanate 500-125mg (AUGMENTIN) 500-125 mg Tab 30 tablet 0 3/9/2017 3/19/2017    Take 1 tablet (500 mg total) by mouth 3 (three) times daily. - Oral    Pharmacy: [address] Ph #: [phone]       collagenase ointment 90 g 0 3/9/2017     Apply topically once daily. - Topical (Top)    Pharmacy: [address] Ph #: [phone]       fluconazole (DIFLUCAN) 100 MG tablet 3 tablet 0 3/9/2017 3/12/2017    Take 1 tablet (100 mg total) by mouth once daily. - Oral    Pharmacy: [address] Ph #: [phone]       hydrocodone-acetaminophen 10-325mg (NORCO)  mg Tab 120 tablet 0 3/9/2017     Take 1 tablet by mouth every 6 (six) hours as needed. - Oral    Pharmacy: [address] Ph #: [phone]         [facility]     [facility] On Call Nurse Care Line - 24/7 Assistance  Registered nurses in the [facility] provide clinical advisement, health education, appointment booking, and other advisory services.  Call for this free service at [phone].             Medications           Message regarding Medications     Verify the changes and/or additions to your medication regime listed below are the same as discussed with your clinician today.  If any of these changes or additions are incorrect, please notify your healthcare provider.        START taking these NEW medications        Refills    amoxicillin-clavulanate 500-125mg (AUGMENTIN) 500-125 mg Tab 0    Sig: Take 1 tablet (500 mg total) by mouth 3  (three) times daily.    Class: Normal    Route: Oral    collagenase ointment 0    Sig: Apply topically once daily.    Class: Normal    Route: Topical (Top)    fluconazole (DIFLUCAN) 100 MG tablet 0    Sig: Take 1 tablet (100 mg total) by mouth once daily.    Class: Normal    Route: Oral      These medications were administered today        Dose Freq    cefTRIAXone injection 500 mg 500 mg Clinic/HOD 1 time    Sig: Inject 0.5 g (500 mg total) into the muscle one time.    Class: Normal    Route: Intramuscular    ketorolac injection 30 mg 30 mg Clinic/HOD 1 time    Sig: Inject 1 mL (30 mg total) into the muscle one time.    Class: Normal    Route: Intramuscular      CHANGE how you are taking these medications     Start Taking Instead of    trazodone (DESYREL) 150 MG tablet trazodone (DESYREL) 100 MG tablet    Dosage:  Take 1 tablet (150 mg total) by mouth every evening. Dosage:  TAKE 1 TABLET EVERY NIGHT AS NEEDED  FOR  INSOMNIA    Reason for Change:  Reorder     hydrocodone-acetaminophen 10-325mg (NORCO)  mg Tab hydrocodone-acetaminophen 10-325mg (NORCO)  mg Tab    Dosage:  Take 1 tablet by mouth every 6 (six) hours as needed. Dosage:  Take 1 tablet by mouth every 8 (eight) hours as needed.    Reason for Change:  Reorder       STOP taking these medications     silver sulfADIAZINE 1% (SILVADENE) 1 % cream Apply topically 2 (two) times daily.           Verify that the below list of medications is an accurate representation of the medications you are currently taking.  If none reported, the list may be blank. If incorrect, please contact your healthcare provider. Carry this list with you in case of emergency.           Current Medications     acetaminophen (TYLENOL) 325 MG tablet Take 2 tablets (650 mg total) by mouth every 6 (six) hours as needed.    albuterol (ACCUNEB) 0.63 mg/3 mL Nebu INHALE THE CONTENTS OF 1 VIAL  BY  NEBULIZER EVERY 6 HOURS AS NEEDED    albuterol (VENTOLIN HFA) 90 mcg/actuation inhaler  INHALE TWO PUFFS BY MOUTH EVERY 6 HOURS AS NEEDED FOR WHEEZING    albuterol-ipratropium 2.5mg-0.5mg/3mL (DUO-NEB) 0.5 mg-3 mg(2.5 mg base)/3 mL nebulizer solution INHALE THE CONTENTS OF 1 VIAL VIA NEBULIZER EVERY 6 HOURS AS NEEDED FOR  WHEEZING (DO NOT USE WITH ALBUTEROL INHALER)    ascorbic acid, vitamin C, (VITAMIN C) 500 MG tablet Take 1 tablet (500 mg total) by mouth every evening.    atorvastatin (LIPITOR) 20 MG tablet Take 1 tablet (20 mg total) by mouth once daily.    blood sugar diagnostic (TRUE METRIX GLUCOSE TEST STRIP) Strp Use to test blood sugar three times a day   DX:E11.9    blood-glucose meter (TRUE METRIX AIR GLUCOSE METER) kit Use as instructed to test blood sugar   DX:E11.9    budesonide (PULMICORT) 0.5 mg/2 mL nebulizer solution INHALE THE CONTENTS OF 1 VIAL VIA NEBULIZER EVERY DAY    clonazePAM (KLONOPIN) 1 MG tablet Take 1 tablet (1 mg total) by mouth 2 (two) times daily as needed for Anxiety.    escitalopram oxalate (LEXAPRO) 10 MG tablet TAKE 1 TABLET ONE TIME DAILY    ferrous sulfate 325 (65 FE) MG EC tablet Take 1 tablet (325 mg total) by mouth 2 (two) times daily with meals.    furosemide (LASIX) 40 MG tablet Take 1 tablet (40 mg total) by mouth 2 (two) times daily.    gabapentin (NEURONTIN) 600 MG tablet Take 1 tablet (600 mg total) by mouth 3 (three) times daily.    lancets (TRUEPLUS LANCETS) 33 gauge Misc 1 lancet by Misc.(Non-Drug; Combo Route) route 3 (three) times daily. To test blood sugar   DX: E11.9    levalbuterol (XOPENEX) 0.63 mg/3 mL nebulizer solution INHALE THE CONTENTS OF 1 VIAL BY NEBULIZATION 3 (THREE) TIMES DAILY.    lisinopril (PRINIVIL,ZESTRIL) 20 MG tablet TAKE 1 TABLET ONE TIME DAILY    meloxicam (MOBIC) 7.5 MG tablet TAKE 1 TABLET EVERY DAY    metformin (GLUCOPHAGE) 500 MG tablet TAKE 1 TABLET TWICE DAILY WITH MEALS    methocarbamol (ROBAXIN) 750 MG Tab TAKE 1 TABLET FOUR TIMES DAILY    metoprolol succinate (TOPROL-XL) 25 MG 24 hr tablet Take 0.5 tablets (12.5 mg  "total) by mouth once daily.    miconazole NITRATE 2 % (MICOTIN) 2 % top powder Apply topically 2 (two) times daily. Skin folds    multivitamin (THERAGRAN) tablet Take 1 tablet by mouth once daily.    polyethylene glycol (GLYCOLAX) 17 gram/dose powder MIX 17 GRAMS IN LIQUID AND DRINK EVERY DAY AS NEEDED    potassium chloride SA (K-DUR,KLOR-CON) 20 MEQ tablet Take 1 tablet (20 mEq total) by mouth once daily.    SPIRIVA WITH HANDIHALER 18 mcg inhalation capsule INHALE THE CONTENTS OF 1 CAPSULE EVERY DAY    temazepam (RESTORIL) 15 mg Cap TAKE 1 CAPSULE ONE TIME DAILY    warfarin (COUMADIN) 5 MG tablet TAKE ONE TABLET EVERY DAY OR AS DIRECTED BY COUMADIN CLINIC    amoxicillin-clavulanate 500-125mg (AUGMENTIN) 500-125 mg Tab Take 1 tablet (500 mg total) by mouth 3 (three) times daily.    collagenase ointment Apply topically once daily.    fluconazole (DIFLUCAN) 100 MG tablet Take 1 tablet (100 mg total) by mouth once daily.    hydrocodone-acetaminophen 10-325mg (NORCO)  mg Tab Take 1 tablet by mouth every 6 (six) hours as needed.    trazodone (DESYREL) 150 MG tablet Take 1 tablet (150 mg total) by mouth every evening.           Clinical Reference Information           Your Vitals Were     BP Pulse Temp Height Weight BMI    118/71 (BP Location: Right arm, Patient Position: Sitting, BP Method: Automatic) 89 97.8 °F (36.6 °C) (Oral) 5' 8" (1.727 m) 146 kg (321 lb 14 oz) 48.94 kg/m2      Blood Pressure          Most Recent Value    BP  118/71      Allergies as of 3/9/2017     Dilaudid [Hydromorphone]      Immunizations Administered on Date of Encounter - 3/9/2017     None      Smoking Cessation     If you would like to quit smoking:   You may be eligible for free services if you are a [place] resident and started smoking cigarettes before September 1, 1988.  Call the [employer]oking [employer]essation [employer] ([employer]) toll free at (176) 928-6020 or (711) 090-0807.   Call 7-800-QUIT-NOW if you do not meet the above criteria.          "   Language Assistance Services     ATTENTION: Language assistance services are available, free of charge. Please call [phone].      ATENCIÓN: Si habla jhony, tiene a dimas disposición servicios gratuitos de asistencia lingüística. Llame al [phone].     CHÚ Ý: N?u b?n nói Ti?ng Vi?t, có các d?ch v? h? tr? ngôn ng? mi?n phí dành cho b?n. G?i s? [phone].         [facility] complies with applicable Federal civil rights laws and does not discriminate on the basis of race, color, national origin, age, disability, or sex.

## 2017-03-09 NOTE — PROGRESS NOTES
Subjective:       Patient ID: Nelly Tian is a 66 y.o. female.    Chief Complaint: Wound Infection    HPI Comments: Patient Active Problem List:     Diabetes mellitus with neuropathy     Long term current use of anticoagulant therapy     Chronic obstructive pulmonary disease with acute exacerbation     Arthritis     Major depression, chronic     DM II (diabetes mellitus, type II), controlled     BMI 36.0-36.9,adult     GERD (gastroesophageal reflux disease)     Tobacco dependency     Asthma     Chronic atrial fibrillation     Hypertension associated with diabetes     Morbid obesity with BMI of 45.0-49.9, adult     PVD (peripheral vascular disease)     Abdominal aortic atherosclerosis     Dependency on pain medication     Acute on chronic combined systolic and diastolic congestive heart failure     History of MI (myocardial infarction)     Iron deficiency anemia     Hepatomegaly     DDD (degenerative disc disease), lumbar     Anxiety     Type II 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, not stated as uncontrolled     Hyperlipidemia     NSAID long-term use     Osteoarthritis of right hip     Mixed restrictive and obstructive lung disease     Hypercapnic respiratory failure, chronic     Chronic respiratory failure with hypoxia     Obesity, Class III, BMI 40-49.9 (morbid obesity)     Acute on chronic diastolic congestive heart failure     Moderate episode of recurrent major depressive disorder     Yeast dermatitis        Rash   This is a recurrent problem. Episode onset: 2 weeks. The affected locations include the abdomen. The rash is characterized by redness (ulcer stage III). She was exposed to an ill contact. Associated symptoms include anorexia, congestion, coughing, fatigue and shortness of breath. Treatments tried: silvaden.     Review of Systems   Constitutional: Positive for fatigue.   HENT: Positive for congestion.    Respiratory: Positive for cough and shortness of breath.   "  Gastrointestinal: Positive for anorexia.   Skin: Positive for rash.       Objective:      Physical Exam   Constitutional: She is oriented to person, place, and time. She appears well-developed.   /63  Pulse 58  Temp(Src) 97.6 °F (36.4 °C) (Oral)  Ht 5' 8" (1.727 m)  Wt 243 lb (110.224 kg)  BMI 36.96 kg/m2     HENT:   Head: Normocephalic and atraumatic.   Right Ear: External ear normal.   Left Ear: External ear normal.   Mouth/Throat: No oropharyngeal exudate.   Eyes: Conjunctivae are normal. Pupils are equal, round, and reactive to light. Right eye exhibits no discharge.   Neck: Normal range of motion. Neck supple. No JVD present. No tracheal deviation present. No thyromegaly present.   Cardiovascular: Normal rate, normal heart sounds and intact distal pulses.    No murmur heard.  Pulmonary/Chest: No respiratory distress. She has no wheezes. She has no rales.   Distant breath sounds, no wheezes.   Abdominal: Soft. She exhibits distension.   Large Left ventral hernia, infected 2 cms stage 3 ulcers.erythema.   Musculoskeletal:   Poor flexibility.   Neurological: She is alert and oriented to person, place, and time. No cranial nerve deficit.   Skin: No rash noted. She is not diaphoretic. No erythema.   Moderate dryness. Examination of the feet reveals warm, good capillary refill, dependent rubor present, dry cracking heels, normal DP and PT pulses and normal monofilament exam.     Psychiatric: She has a normal mood and affect. Her behavior is normal. Thought content normal.   Depressed, crying and loss of pleasure. No psychosis.   Vitals reviewed.        Chemistry        Component Value Date/Time     03/08/2017 1515    K 3.9 03/08/2017 1515     03/08/2017 1515    CO2 28 03/08/2017 1515    BUN 15 03/08/2017 1515    CREATININE 0.7 03/08/2017 1515    GLU 64 (L) 03/08/2017 1515        Component Value Date/Time    CALCIUM 9.7 03/08/2017 1515    ALKPHOS 76 02/14/2017 1522    AST 24 02/14/2017 1522    " ALT 12 02/14/2017 1522    BILITOT 0.5 02/14/2017 1522        Lab Results   Component Value Date    HGBA1C 6.1 02/14/2017     Lab Results   Component Value Date    WBC 10.00 03/02/2017    HGB 13.5 03/02/2017    HCT 46.3 03/02/2017    MCV 89 03/02/2017     03/02/2017       Assessment:       1. Decubitus ulcer, infected, stage III        Plan:       Decubitus ulcer, infected, stage III  -     cefTRIAXone injection 500 mg; Inject 0.5 g (500 mg total) into the muscle one time.  -     ketorolac injection 30 mg; Inject 1 mL (30 mg total) into the muscle one time.  -     amoxicillin-clavulanate 500-125mg (AUGMENTIN) 500-125 mg Tab; Take 1 tablet (500 mg total) by mouth 3 (three) times daily.  Dispense: 30 tablet; Refill: 0  -     collagenase ointment; Apply topically once daily.  Dispense: 90 g; Refill: 0    Other orders  -     trazodone (DESYREL) 150 MG tablet; Take 1 tablet (150 mg total) by mouth every evening.  Dispense: 90 tablet; Refill: 0  -     fluconazole (DIFLUCAN) 100 MG tablet; Take 1 tablet (100 mg total) by mouth once daily.  Dispense: 3 tablet; Refill: 0      Patient readiness: acceptance and barriers:readiness    During the course of the visit the patient was educated and counseled about the following:     Diabetes:  Discussed general issues about diabetes pathophysiology and management.  Hypertension:   Dietary sodium restriction.  Regular aerobic exercise.  Check blood pressures daily and record.  Obesity:   General weight loss/lifestyle modification strategies discussed (elicit support from others; identify saboteurs; non-food rewards, etc).    Goals: Diabetes: Maintain Hemoglobin A1C below 7, Hypertension: Reduce Blood Pressure and Obesity: Reduce calorie intake and BMI    Did patient meet goals/outcomes: No    The following self management tools provided: blood pressure log  blood glucose log  excercise log    Patient Instructions (the written plan) was given to the patient/family.     Time spent  with patient: 45 minutes

## 2017-03-10 ENCOUNTER — DOCUMENTATION ONLY (OUTPATIENT)
Dept: FAMILY MEDICINE | Facility: CLINIC | Age: 67
End: 2017-03-10

## 2017-03-10 ENCOUNTER — HOSPITAL ENCOUNTER (EMERGENCY)
Facility: HOSPITAL | Age: 67
Discharge: HOME | End: 2017-03-10
Attending: EMERGENCY MEDICINE
Payer: MEDICARE

## 2017-03-10 ENCOUNTER — TELEPHONE (OUTPATIENT)
Dept: FAMILY MEDICINE | Facility: CLINIC | Age: 67
End: 2017-03-10

## 2017-03-10 VITALS
BODY MASS INDEX: 45.99 KG/M2 | RESPIRATION RATE: 18 BRPM | SYSTOLIC BLOOD PRESSURE: 109 MMHG | OXYGEN SATURATION: 96 % | HEIGHT: 67 IN | WEIGHT: 293 LBS | DIASTOLIC BLOOD PRESSURE: 86 MMHG | HEART RATE: 83 BPM | TEMPERATURE: 98 F

## 2017-03-10 DIAGNOSIS — L98.492 ABDOMINAL WALL ULCER, WITH FAT LAYER EXPOSED: Primary | ICD-10-CM

## 2017-03-10 LAB
ALBUMIN SERPL BCP-MCNC: 3.1 G/DL
ALP SERPL-CCNC: 71 U/L
ALT SERPL W/O P-5'-P-CCNC: 11 U/L
ANION GAP SERPL CALC-SCNC: 10 MMOL/L
AST SERPL-CCNC: 15 U/L
BASOPHILS # BLD AUTO: 0.1 K/UL
BASOPHILS NFR BLD: 0.9 %
BILIRUB SERPL-MCNC: 0.2 MG/DL
BUN SERPL-MCNC: 19 MG/DL
CALCIUM SERPL-MCNC: 9.5 MG/DL
CHLORIDE SERPL-SCNC: 100 MMOL/L
CO2 SERPL-SCNC: 28 MMOL/L
CREAT SERPL-MCNC: 0.7 MG/DL
CRP SERPL-MCNC: 36.7 MG/L
DIFFERENTIAL METHOD: ABNORMAL
EOSINOPHIL # BLD AUTO: 0.3 K/UL
EOSINOPHIL NFR BLD: 2.4 %
ERYTHROCYTE [DISTWIDTH] IN BLOOD BY AUTOMATED COUNT: 17 %
ERYTHROCYTE [SEDIMENTATION RATE] IN BLOOD BY WESTERGREN METHOD: 40 MM/HR
EST. GFR  (AFRICAN AMERICAN): >60 ML/MIN/1.73 M^2
EST. GFR  (NON AFRICAN AMERICAN): >60 ML/MIN/1.73 M^2
GLUCOSE SERPL-MCNC: 133 MG/DL
HCT VFR BLD AUTO: 39.3 %
HGB BLD-MCNC: 12.1 G/DL
INR PPP: 2.6
LYMPHOCYTES # BLD AUTO: 1.6 K/UL
LYMPHOCYTES NFR BLD: 14.5 %
MCH RBC QN AUTO: 25.2 PG
MCHC RBC AUTO-ENTMCNC: 30.9 %
MCV RBC AUTO: 82 FL
MONOCYTES # BLD AUTO: 1 K/UL
MONOCYTES NFR BLD: 9 %
NEUTROPHILS # BLD AUTO: 8 K/UL
NEUTROPHILS NFR BLD: 73.2 %
PLATELET # BLD AUTO: 245 K/UL
PMV BLD AUTO: 8.8 FL
POTASSIUM SERPL-SCNC: 4.3 MMOL/L
PROT SERPL-MCNC: 7.1 G/DL
PROTHROMBIN TIME: 26.1 SEC
RBC # BLD AUTO: 4.81 M/UL
SODIUM SERPL-SCNC: 138 MMOL/L
WBC # BLD AUTO: 10.9 K/UL

## 2017-03-10 PROCEDURE — 99283 EMERGENCY DEPT VISIT LOW MDM: CPT | Mod: 25

## 2017-03-10 PROCEDURE — 96372 THER/PROPH/DIAG INJ SC/IM: CPT

## 2017-03-10 PROCEDURE — 85610 PROTHROMBIN TIME: CPT

## 2017-03-10 PROCEDURE — 63600175 PHARM REV CODE 636 W HCPCS: Performed by: EMERGENCY MEDICINE

## 2017-03-10 PROCEDURE — 36415 COLL VENOUS BLD VENIPUNCTURE: CPT

## 2017-03-10 PROCEDURE — 86140 C-REACTIVE PROTEIN: CPT

## 2017-03-10 PROCEDURE — 85025 COMPLETE CBC W/AUTO DIFF WBC: CPT

## 2017-03-10 PROCEDURE — 80053 COMPREHEN METABOLIC PANEL: CPT

## 2017-03-10 PROCEDURE — 85651 RBC SED RATE NONAUTOMATED: CPT

## 2017-03-10 RX ORDER — KETOROLAC TROMETHAMINE 30 MG/ML
15 INJECTION, SOLUTION INTRAMUSCULAR; INTRAVENOUS
Status: COMPLETED | OUTPATIENT
Start: 2017-03-10 | End: 2017-03-10

## 2017-03-10 RX ORDER — CLINDAMYCIN HYDROCHLORIDE 150 MG/1
300 CAPSULE ORAL 4 TIMES DAILY
Qty: 56 CAPSULE | Refills: 0 | Status: ON HOLD | OUTPATIENT
Start: 2017-03-10 | End: 2017-03-18

## 2017-03-10 RX ADMIN — KETOROLAC TROMETHAMINE 15 MG: 30 INJECTION, SOLUTION INTRAMUSCULAR at 04:03

## 2017-03-10 NOTE — ED AVS SNAPSHOT
[facility] [address] [place] [address]               Nelly Tian   3/10/2017  1:36 PM   ED    Description:  Female : 1950   Department:  [facility]           Your Care was Coordinated By:     Provider Role From To    Lex Ridley MD Attending Provider 03/10/17 6838 --      Reason for Visit     abdominal wall skin infection           Diagnoses this Visit        Comments    Abdominal wall ulcer, with fat layer exposed    -  Primary Dony III      ED Disposition     ED Disposition Condition Comment    Discharge             To Do List           Follow-up Information     Schedule an appointment as soon as possible for a visit with Wound care clinic.        Follow up with [facility].    Specialty:  Emergency Medicine    Why:  As needed, If symptoms worsen    Contact information:    [address] [place] [address] [phone]       These Medications        Disp Refills Start End    clindamycin (CLEOCIN) 150 MG capsule 56 capsule 0 3/10/2017 3/17/2017    Take 2 capsules (300 mg total) by mouth 4 (four) times daily. - Oral    Pharmacy: [address] Ph #: [phone]         [facility]     [facility] -  Assistance  Registered nurses in the [facility] provide clinical advisement, health education, appointment booking, and other advisory services.  Call for this free service at [phone].             Medications           Message regarding Medications     Verify the changes and/or additions to your medication regime listed below are the same as discussed with your clinician today.  If any of these changes or additions are incorrect, please notify your healthcare provider.        START taking these NEW medications        Refills    clindamycin (CLEOCIN) 150 MG capsule 0    Sig: Take 2  capsules (300 mg total) by mouth 4 (four) times daily.    Class: Print    Route: Oral      These medications were administered today        Dose Freq    ketorolac injection 15 mg 15 mg ED 1 Time    Sig: Inject 15 mg into the muscle ED 1 Time.    Class: Normal    Route: Intramuscular      STOP taking these medications     amoxicillin-clavulanate 500-125mg (AUGMENTIN) 500-125 mg Tab Take 1 tablet (500 mg total) by mouth 3 (three) times daily.           Verify that the below list of medications is an accurate representation of the medications you are currently taking.  If none reported, the list may be blank. If incorrect, please contact your healthcare provider. Carry this list with you in case of emergency.           Current Medications     acetaminophen (TYLENOL) 325 MG tablet Take 2 tablets (650 mg total) by mouth every 6 (six) hours as needed.    albuterol (ACCUNEB) 0.63 mg/3 mL Nebu INHALE THE CONTENTS OF 1 VIAL  BY  NEBULIZER EVERY 6 HOURS AS NEEDED    albuterol (VENTOLIN HFA) 90 mcg/actuation inhaler INHALE TWO PUFFS BY MOUTH EVERY 6 HOURS AS NEEDED FOR WHEEZING    albuterol-ipratropium 2.5mg-0.5mg/3mL (DUO-NEB) 0.5 mg-3 mg(2.5 mg base)/3 mL nebulizer solution INHALE THE CONTENTS OF 1 VIAL VIA NEBULIZER EVERY 6 HOURS AS NEEDED FOR  WHEEZING (DO NOT USE WITH ALBUTEROL INHALER)    ascorbic acid, vitamin C, (VITAMIN C) 500 MG tablet Take 1 tablet (500 mg total) by mouth every evening.    atorvastatin (LIPITOR) 20 MG tablet Take 1 tablet (20 mg total) by mouth once daily.    blood sugar diagnostic (TRUE METRIX GLUCOSE TEST STRIP) Strp Use to test blood sugar three times a day   DX:E11.9    blood-glucose meter (TRUE METRIX AIR GLUCOSE METER) kit Use as instructed to test blood sugar   DX:E11.9    budesonide (PULMICORT) 0.5 mg/2 mL nebulizer solution INHALE THE CONTENTS OF 1 VIAL VIA NEBULIZER EVERY DAY    clindamycin (CLEOCIN) 150 MG capsule Take 2 capsules (300 mg total) by mouth 4 (four) times daily.    clonazePAM  (KLONOPIN) 1 MG tablet Take 1 tablet (1 mg total) by mouth 2 (two) times daily as needed for Anxiety.    collagenase ointment Apply topically once daily.    escitalopram oxalate (LEXAPRO) 10 MG tablet TAKE 1 TABLET ONE TIME DAILY    ferrous sulfate 325 (65 FE) MG EC tablet Take 1 tablet (325 mg total) by mouth 2 (two) times daily with meals.    fluconazole (DIFLUCAN) 100 MG tablet Take 1 tablet (100 mg total) by mouth once daily.    furosemide (LASIX) 40 MG tablet Take 1 tablet (40 mg total) by mouth 2 (two) times daily.    gabapentin (NEURONTIN) 600 MG tablet Take 1 tablet (600 mg total) by mouth 3 (three) times daily.    hydrocodone-acetaminophen 10-325mg (NORCO)  mg Tab Take 1 tablet by mouth every 6 (six) hours as needed.    ketorolac injection 15 mg Inject 15 mg into the muscle ED 1 Time.    lancets (TRUEPLUS LANCETS) 33 gauge Misc 1 lancet by Misc.(Non-Drug; Combo Route) route 3 (three) times daily. To test blood sugar   DX: E11.9    levalbuterol (XOPENEX) 0.63 mg/3 mL nebulizer solution INHALE THE CONTENTS OF 1 VIAL BY NEBULIZATION 3 (THREE) TIMES DAILY.    lisinopril (PRINIVIL,ZESTRIL) 20 MG tablet TAKE 1 TABLET ONE TIME DAILY    meloxicam (MOBIC) 7.5 MG tablet TAKE 1 TABLET EVERY DAY    metformin (GLUCOPHAGE) 500 MG tablet TAKE 1 TABLET TWICE DAILY WITH MEALS    methocarbamol (ROBAXIN) 750 MG Tab TAKE 1 TABLET FOUR TIMES DAILY    metoprolol succinate (TOPROL-XL) 25 MG 24 hr tablet Take 0.5 tablets (12.5 mg total) by mouth once daily.    miconazole NITRATE 2 % (MICOTIN) 2 % top powder Apply topically 2 (two) times daily. Skin folds    multivitamin (THERAGRAN) tablet Take 1 tablet by mouth once daily.    polyethylene glycol (GLYCOLAX) 17 gram/dose powder MIX 17 GRAMS IN LIQUID AND DRINK EVERY DAY AS NEEDED    potassium chloride SA (K-DUR,KLOR-CON) 20 MEQ tablet Take 1 tablet (20 mEq total) by mouth once daily.    SPIRIVA WITH HANDIHALER 18 mcg inhalation capsule INHALE THE CONTENTS OF 1 CAPSULE EVERY  "DAY    temazepam (RESTORIL) 15 mg Cap TAKE 1 CAPSULE ONE TIME DAILY    trazodone (DESYREL) 150 MG tablet Take 1 tablet (150 mg total) by mouth every evening.    warfarin (COUMADIN) 5 MG tablet TAKE ONE TABLET EVERY DAY OR AS DIRECTED BY COUMADIN CLINIC           Clinical Reference Information           Your Vitals Were     BP Pulse Temp Resp Height Weight    106/60 (BP Location: Right arm, Patient Position: Sitting) 80 98.3 °F (36.8 °C) (Oral) 16 5' 7" (1.702 m) 146 kg (321 lb 14 oz)    SpO2 BMI             95% 50.41 kg/m2         Allergies as of 3/10/2017        Reactions    Dilaudid [Hydromorphone] Anaphylaxis    Other reaction(s): Anaphylaxis  Other reaction(s): Unknown      Immunizations Administered on Date of Encounter - 3/10/2017     None      ED Micro, Lab, POCT     Start Ordered       Status Ordering Provider    03/10/17 1426 03/10/17 1425  Sedimentation rate, manual  STAT      Final result     03/10/17 1426 03/10/17 1425  C-reactive protein  STAT      Final result     03/10/17 1410 03/10/17 1409  CBC auto differential  STAT      Final result     03/10/17 1410 03/10/17 1409  Comprehensive metabolic panel  STAT      Final result     03/10/17 1410 03/10/17 1409  Protime-INR  STAT      Final result       ED Imaging Orders     None        Discharge Instructions         Wound Care  Taking proper care of your wound will help it heal. Your healthcare provider may show you how to clean and dress the wound. He or she will also explain how to tell if the wound is healing normally. If you are unsure of how to take care of the wound, be sure to clarify what dressing to use and how often you should change the bandages. Here are the basic steps.     A wound that's not healing normally may be dark in color or have white streaks.   Wash your hands  Tips for washing your hands include:  · Use liquid soap and lather for 2 minutes. Scrub between your fingers and under your nails.  · Rinse with warm water, keeping your fingers " pointing down.  · Use a paper towel to dry your hands and to turn off the faucet.  Remove the used dressing  Here are suggestions for removing the dressing:  · If dressing changes cause you pain, be sure to take your pain medicine as prescribed by your healthcare provider 30 minutes before dressing changes.  · Set up your supplies.  · Put on disposable gloves if youre dressing a wound for someone else or your wound is infected.  · Loosen the tape by pulling gently toward the wound.  · Gently take off the old dressing. If the dressing is stuck to the wound, moisten it with saline (if available) or clean water.  · If you have a drain or tube in the wound, be careful not to pull on it.  · Remove the dressing 1 layer at a time and put it in a plastic bag.  · Remove your gloves.  Inspect and dress the wound  Check the wound carefully:  · Each time you change the dressing, inspect the wound carefully to be sure its healing normally by making sure your wound appears to be pink and moist, and is free of infection.    · Wash your hands again. Put on a new pair of gloves.  · Clean and dress the wound as directed by your healthcare provider or nurse. Do not put anything in the wound that is not prescribed or directed by your healthcare provider. If you have a drain or tube, be careful not to pull on it. Make sure to secure the drain or tube as well.  · Put all supplies in a plastic bag. Seal the bag and put it in the trash.  · Be sure to wash your hands again.  Call your healthcare provider  Call your healthcare provider if you see any of the following signs of a problem:  · Bleeding that soaks the dressing  · Pink fluid weeping from the wound  · Increased drainage or drainage that is yellow, yellow-green, or foul-smelling  · Increased swelling or pain, or redness or swelling in the skin around the wound  · A change in the color of the wound, or if streaks develop in a direction away from the wound  · The area between any  stitches opens up  · An increase in the size of the wound  · A fever of 100.4°F (38°C) or higher, or as directed by your healthcare provider  · Chills, increased fatigue, or a loss of appetite        Simple Skin Ulcer  A skin ulcer is a sore on the skin. Skin ulcers often form when blood circulation is impaired. Being bed- or wheelchair-bound can cause pressure that may lead to skin ulcers. Ulcers are generally round areas of red, swollen, thickened skin around a crater-like depression. They are often very slow to heal. If a skin ulcer isn't properly treated, it may become infected. If the infection spreads, it can cause serious health issues.    Symptoms of a skin ulcer include:  · Reddish area on the skin  · Skin color and texture changes  · Swelling  · Wound that isn't healing  · Crater in the skin  · Pain  · Drainage or pus  Causes  There are many causes of skin ulcers. Some of these include:  · Decreased blood flow to a part of the skin, vascular insufficiency  · Trauma  · Lack of movement of a part of the body for long periods of time  · Infection  · Poor hygiene  · Varicose veins  · Vitamin deficiency  Pressure ulcers  Pressure ulcers are a type of ulcer most commonly seen in people who are confined to bed or a wheelchair. They are caused by prolonged pressure to a spot on the skin. Pressure ulcers usually occur on the back, buttocks, or backs or sides of the legs, arms, or feet (especially the heels).  Home care  You may be prescribed antibiotics to prevent infection. If this is the case, be sure to take all of the medicine, even if your symptoms get better. You may also be given medicines to help relieve pain. Follow the healthcare providers instructions when using these medicines.  General care  · Care for the skin ulcer as instructed. Always wash your hands with soap and warm water before and after caring for your wound.  · Cover the ulcer with a clean, dry bandage. Remove and change the bandage as  instructed. If the bandage becomes wet or dirty, change it as soon as possible.  · Follow the doctors instructions about washing. You can shower, but do not soak the healing ulcer until the doctor says its OK.  · Do not scratch, rub, or pick at the healing skin.  · Check the area every day for signs of infection, such as increasing pain, redness, warmth, red streaking, swelling, or pus draining from the ulcer.  · When resting, raise the area where the ulcer is above the level of the heart.  · Avoid smoking or drinking alcohol, as these can delay wound healing.  · If you are able, try to walk regularly. This can help with circulation.  · Avoid prolonged standing or sitting in one position.  The following tips can help prevent future skin ulcers:  · Know your risks for skin ulcers.  · Keep the skin clean and dry.  · Reposition frequently.  · Use protective devices such as pillows, foam wedges, and heel protectors for the knees, ankles, and heels.  · Avoid immobilization.  Follow-up care  Follow up with your healthcare provider, or as advised.  When to seek medical advice  Call your healthcare provider right away if any of these occur:  · Fever of 100.4°F (38°C) or higher, or as advised by your healthcare provider  · Signs of infection. These include increasing pain, warmth, redness, or pus draining from the skin ulcer.  · Bleeding from the skin ulcer  · Pain in or around the ulcer that doesn't get better even with medicines  · Increased swelling  · Changes in skin color  Date Last Reviewed: 9/1/2016  © 6560-8901 [employer]. [address] [place] [address]. All rights reserved. This information is not intended as a substitute for professional medical care. Always follow your healthcare professional's instructions.        Date Last Reviewed: 7/30/2015  © 2014-9454 [employer]. [address] [place] [address]. All rights reserved. This information is not intended as a  substitute for professional medical care. Always follow your healthcare professional's instructions.          Your Scheduled Appointments     Mar 13, 2017 11:20 AM CDT   Hospital Follow Up with DARIEN Mohr-SHADI   [facility] ([place])    [address]  [facility] [address]   [phone]            Mar 16, 2017  2:00 PM CDT   Established Patient Visit with Chu Mack MD   [place] [facility] Cardiology ([facility])    [address]  [facility] [address]   [phone]            Mar 29, 2017 11:00 AM CDT   Established Patient Visit with Marlyn Mcotezuma PA-C   [facility] ([place])    [address] [place] [address] [address]  [facility] [address]   [phone]            Jun 05, 2017  9:20 AM CDT   Non-Fasting Lab with [facility] ([facility])    [address]  [facility] [address]   [phone]            Jun 09, 2017  1:20 PM CDT   Established Patient Visit with Laura Ramos MD   [place] - Hematology Oncology ([facility])    [address]  [facility] [address]   [phone]               [facility] complies with applicable Federal civil rights laws and does not discriminate on the basis of race, color, national origin, age, disability, or sex.        Language Assistance Services     ATTENTION: Language assistance services are available, free of charge. Please call [phone].      ATENCIÓN: Si habla español, tiene a dimas disposición servicios gratuitos de asistencia lingüística. Llame al [phone].     CHÚ Ý: N?u b?n nói Ti?ng Vi?t, có các d?ch v? h? tr? ngôn ng? mi?n phí dành cho b?n. G?i s? [phone].

## 2017-03-10 NOTE — PLAN OF CARE
Asked to evaluate patient for admission.     Pleasant 65 yo lady with large ventral hernia assoc with multiple lateral small stage 3 ulcers. Non infected. Came in to ED bc of pain not relieved by her oral norco. She is currently not in pain while in the ED. Analgesics have not been given in the ED. Denies fever/ chills/ drainage from ulcers.     On exam large ventral ulcer with multiple small ulcers. Non draining. No infection.     Recommend  1. Change Abx to clindamycin  2. toradol injection- 60 mg IM  3. Naprosyn or toradol Rx for home use (to supplement her norco Rx)  4. Will place internal consult to outpt wound care    Admission not warranted today.   Thanks,  Saba Mora MD

## 2017-03-10 NOTE — PROGRESS NOTES
Pre-Visit Chart Review  For Appointment Scheduled on 3/13/17    Health Maintenance Due   Topic Date Due    DEXA SCAN  11/15/1991    Zoster Vaccine  11/15/2011    Colonoscopy  02/01/2016

## 2017-03-10 NOTE — DISCHARGE INSTRUCTIONS
Wound Care  Taking proper care of your wound will help it heal. Your healthcare provider may show you how to clean and dress the wound. He or she will also explain how to tell if the wound is healing normally. If you are unsure of how to take care of the wound, be sure to clarify what dressing to use and how often you should change the bandages. Here are the basic steps.     A wound that's not healing normally may be dark in color or have white streaks.   Wash your hands  Tips for washing your hands include:  · Use liquid soap and lather for 2 minutes. Scrub between your fingers and under your nails.  · Rinse with warm water, keeping your fingers pointing down.  · Use a paper towel to dry your hands and to turn off the faucet.  Remove the used dressing  Here are suggestions for removing the dressing:  · If dressing changes cause you pain, be sure to take your pain medicine as prescribed by your healthcare provider 30 minutes before dressing changes.  · Set up your supplies.  · Put on disposable gloves if youre dressing a wound for someone else or your wound is infected.  · Loosen the tape by pulling gently toward the wound.  · Gently take off the old dressing. If the dressing is stuck to the wound, moisten it with saline (if available) or clean water.  · If you have a drain or tube in the wound, be careful not to pull on it.  · Remove the dressing 1 layer at a time and put it in a plastic bag.  · Remove your gloves.  Inspect and dress the wound  Check the wound carefully:  · Each time you change the dressing, inspect the wound carefully to be sure its healing normally by making sure your wound appears to be pink and moist, and is free of infection.    · Wash your hands again. Put on a new pair of gloves.  · Clean and dress the wound as directed by your healthcare provider or nurse. Do not put anything in the wound that is not prescribed or directed by your healthcare provider. If you have a drain or tube, be  careful not to pull on it. Make sure to secure the drain or tube as well.  · Put all supplies in a plastic bag. Seal the bag and put it in the trash.  · Be sure to wash your hands again.  Call your healthcare provider  Call your healthcare provider if you see any of the following signs of a problem:  · Bleeding that soaks the dressing  · Pink fluid weeping from the wound  · Increased drainage or drainage that is yellow, yellow-green, or foul-smelling  · Increased swelling or pain, or redness or swelling in the skin around the wound  · A change in the color of the wound, or if streaks develop in a direction away from the wound  · The area between any stitches opens up  · An increase in the size of the wound  · A fever of 100.4°F (38°C) or higher, or as directed by your healthcare provider  · Chills, increased fatigue, or a loss of appetite        Simple Skin Ulcer  A skin ulcer is a sore on the skin. Skin ulcers often form when blood circulation is impaired. Being bed- or wheelchair-bound can cause pressure that may lead to skin ulcers. Ulcers are generally round areas of red, swollen, thickened skin around a crater-like depression. They are often very slow to heal. If a skin ulcer isn't properly treated, it may become infected. If the infection spreads, it can cause serious health issues.    Symptoms of a skin ulcer include:  · Reddish area on the skin  · Skin color and texture changes  · Swelling  · Wound that isn't healing  · Crater in the skin  · Pain  · Drainage or pus  Causes  There are many causes of skin ulcers. Some of these include:  · Decreased blood flow to a part of the skin, vascular insufficiency  · Trauma  · Lack of movement of a part of the body for long periods of time  · Infection  · Poor hygiene  · Varicose veins  · Vitamin deficiency  Pressure ulcers  Pressure ulcers are a type of ulcer most commonly seen in people who are confined to bed or a wheelchair. They are caused by prolonged pressure to  a spot on the skin. Pressure ulcers usually occur on the back, buttocks, or backs or sides of the legs, arms, or feet (especially the heels).  Home care  You may be prescribed antibiotics to prevent infection. If this is the case, be sure to take all of the medicine, even if your symptoms get better. You may also be given medicines to help relieve pain. Follow the healthcare providers instructions when using these medicines.  General care  · Care for the skin ulcer as instructed. Always wash your hands with soap and warm water before and after caring for your wound.  · Cover the ulcer with a clean, dry bandage. Remove and change the bandage as instructed. If the bandage becomes wet or dirty, change it as soon as possible.  · Follow the doctors instructions about washing. You can shower, but do not soak the healing ulcer until the doctor says its OK.  · Do not scratch, rub, or pick at the healing skin.  · Check the area every day for signs of infection, such as increasing pain, redness, warmth, red streaking, swelling, or pus draining from the ulcer.  · When resting, raise the area where the ulcer is above the level of the heart.  · Avoid smoking or drinking alcohol, as these can delay wound healing.  · If you are able, try to walk regularly. This can help with circulation.  · Avoid prolonged standing or sitting in one position.  The following tips can help prevent future skin ulcers:  · Know your risks for skin ulcers.  · Keep the skin clean and dry.  · Reposition frequently.  · Use protective devices such as pillows, foam wedges, and heel protectors for the knees, ankles, and heels.  · Avoid immobilization.  Follow-up care  Follow up with your healthcare provider, or as advised.  When to seek medical advice  Call your healthcare provider right away if any of these occur:  · Fever of 100.4°F (38°C) or higher, or as advised by your healthcare provider  · Signs of infection. These include increasing pain, warmth,  redness, or pus draining from the skin ulcer.  · Bleeding from the skin ulcer  · Pain in or around the ulcer that doesn't get better even with medicines  · Increased swelling  · Changes in skin color  Date Last Reviewed: 9/1/2016 © 2000-2016 [employer]. [address] [place] [address]. All rights reserved. This information is not intended as a substitute for professional medical care. Always follow your healthcare professional's instructions.        Date Last Reviewed: 7/30/2015 © 2000-2016 [employer]. [address] [place] [address]. All rights reserved. This information is not intended as a substitute for professional medical care. Always follow your healthcare professional's instructions.

## 2017-03-10 NOTE — TELEPHONE ENCOUNTER
----- Message from Jannie Hansen sent at 3/10/2017 12:24 PM CST -----  Contact: [facility] called regarding the patient being referred to them. Need to know wound location, was not given on the paperwork. Please contact [facility] [phone]

## 2017-03-10 NOTE — TELEPHONE ENCOUNTER
----- Message from Shira Aguirre sent at 3/10/2017 12:12 PM CST -----  Patients daughter , Maye called to inform patient is not doing better after yesterdays visit and is in a lot of pain on her hernia, she will be taking patient  to the ER contact her at [phone] with any questions.     Thank you

## 2017-03-10 NOTE — ED PROVIDER NOTES
"Encounter Date: 3/10/2017    SCRIBE #1 NOTE: IGrace, am scribing for, and in the presence of, Dr. Ridley.       History     Chief Complaint   Patient presents with    abdominal wall skin infection     Review of patient's allergies indicates:   Allergen Reactions    Dilaudid [hydromorphone] Anaphylaxis     Other reaction(s): Anaphylaxis  Other reaction(s): Unknown     HPI Comments:   03/10/2017 2:14 PM     Chief Complaint: Skin infection      The patient is a 66 y.o. female with DM2, HTN, HLD, MI, CHF, COPD, anemia, and anemia who presents to the ED with an onset of worsening skin infection over an abdominal hernia with a "brown and yellow" discharge after obtaining a skin tear after accidentally scraping the abdominal hernia the wall while using the bathroom when she was last admitted in the hospital from 2/14-2/19. She was seen by Dr. Bird yesterday, given a Ceftriaxone and Ketorolac shot, and referred to the ER if symptoms worsen. The patient has tried applying Silvadene to the infected area with no relief. She reports prior bed sores in the same location. The patient denies fever or any other symptoms at this time. No pertinent SHx noted. Dilaudid drug allergy noted.    The history is provided by the patient and a relative (daughter).     Past Medical History:   Diagnosis Date    *Atrial flutter     Anxiety     Arthritis     Asthma     Atrial fibrillation     Back pain     Cataract     OD    CHF (congestive heart failure)     COPD (chronic obstructive pulmonary disease)     Depression     Diabetes mellitus     Emphysema of lung     Heart failure     Hernia     Hypercapnic respiratory failure, chronic 11/16/2016    Hyperlipidemia     Hypertension     Iron deficiency anemia 2/3/2016    Myocardial infarction     Obesity     Peripheral vascular disease     Pneumonia     Polyneuropathy     Retinal detachment     OS    Tobacco dependence     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, not stated as uncontrolled      Past Surgical History:   Procedure Laterality Date    CATARACT EXTRACTION Left     OS      SECTION      x2    EYE SURGERY      HYSTERECTOMY      OOPHORECTOMY      one ovary conserved    RETINAL DETACHMENT SURGERY      buckle --OS     Family History   Problem Relation Age of Onset    Arthritis Father     Heart disease Father     Early death Sister 30     heart disease    Heart disease Sister 32     MI    No Known Problems Brother     No Known Problems Daughter     Blindness Son      due to accident//    Arthritis Sister     Heart disease Sister     Crohn's disease Sister     Cancer Brother      asbestosis    Breast cancer Neg Hx     Glaucoma Neg Hx     Macular degeneration Neg Hx     Retinal detachment Neg Hx     Amblyopia Neg Hx     Diabetes Neg Hx     Cataracts Neg Hx     Hypertension Neg Hx     Strabismus Neg Hx     Stroke Neg Hx     Thyroid disease Neg Hx      Social History   Substance Use Topics    Smoking status: Current Some Day Smoker     Packs/day: 0.50     Years: 50.00     Types: Cigarettes     Start date: 1968     Last attempt to quit: 2015    Smokeless tobacco: Former User     Quit date: 2017      Comment: 1 ppd for 20 years    Alcohol use No     Review of Systems   Constitutional: Negative for chills and fever.   HENT: Negative for congestion, rhinorrhea, sneezing and sore throat.    Eyes: Negative for visual disturbance.   Respiratory: Negative for cough and shortness of breath.    Cardiovascular: Negative for chest pain and palpitations.   Gastrointestinal: Negative for abdominal pain, diarrhea, nausea and vomiting.   Genitourinary: Negative for dysuria and hematuria.   Musculoskeletal: Negative for back pain and neck pain.   Skin: Positive for wound. Negative for rash.   Neurological: Negative for seizures, syncope and headaches.       Physical Exam   Initial Vitals   BP  Pulse Resp Temp SpO2   03/10/17 1334 03/10/17 1334 03/10/17 1334 03/10/17 1334 03/10/17 1334   106/60 80 16 98.3 °F (36.8 °C) 95 %     Physical Exam    Nursing note and vitals reviewed.  Constitutional: She appears well-developed and well-nourished. She is not diaphoretic. No distress.   HENT:   Head: Normocephalic and atraumatic.   Mouth/Throat: Oropharynx is clear and moist.   Eyes: Conjunctivae are normal.   Neck: Neck supple.   Cardiovascular: Normal rate, regular rhythm, normal heart sounds and intact distal pulses. Exam reveals no gallop and no friction rub.    No murmur heard.  Pulmonary/Chest: Breath sounds normal. She has no wheezes. She has no rhonchi. She has no rales.   Abdominal: Soft. She exhibits distension. There is no tenderness.   Musculoskeletal: Normal range of motion.   Neurological: She is alert and oriented to person, place, and time.   Skin: Skin is warm. There is erythema.   Area of Stage III with mostly Stage II ulceration over the left abdominal wall over lining a large abdominal wall hernia with modeled skin, surrounding erythema, and mild warmth. No discharge or foul odor. 72 cm x 40 cm area of erythema.    Psychiatric: She has a normal mood and affect. Her behavior is normal. Judgment and thought content normal.                       ED Course   Procedures  Labs Reviewed   CBC W/ AUTO DIFFERENTIAL - Abnormal; Notable for the following:        Result Value    MCH 25.2 (*)     MCHC 30.9 (*)     RDW 17.0 (*)     MPV 8.8 (*)     Gran # 8.0 (*)     Gran% 73.2 (*)     Lymph% 14.5 (*)     All other components within normal limits   COMPREHENSIVE METABOLIC PANEL - Abnormal; Notable for the following:     Glucose 133 (*)     Albumin 3.1 (*)     All other components within normal limits   PROTIME-INR - Abnormal; Notable for the following:     Prothrombin Time 26.1 (*)     INR 2.6 (*)     All other components within normal limits   SEDIMENTATION RATE, MANUAL - Abnormal; Notable for the following:      Sed Rate 40 (*)     All other components within normal limits   C-REACTIVE PROTEIN - Abnormal; Notable for the following:     CRP 36.7 (*)     All other components within normal limits             Medical Decision Making:   History:   Old Medical Records: I decided to obtain old medical records.  Initial Assessment:   66-year-old woman presents for evaluation of left lateral abdominal wound.  She is afebrile well-appearing and nontoxic with a normal white blood cell count.  Her exam is consistent with a stage III and stage II ulceration of her left lateral abdominal wall with some mild surrounding erythema but really has very minimal warmth.  I do not detect any fluctuance, significant induration or bowel odor or discharge.  This is more likely pressure ulcer, possibly mild  Cellulitis.  Discussed with hospital medicine who evaluated patient and made recommendations.  We'll set up outpatient wound care and to change antibiotics from amoxicillin to clindamycin.  I do not think she is septic or has significant cellulitis that requires admission for IV antibiotics.  Treatment plan was discussed with patient and family who are in agreement.  She is discharged improved in no acute distress.  Clinical Tests:   Lab Tests: Reviewed and Ordered            Scribe Attestation:   Scribe #1: I performed the above scribed service and the documentation accurately describes the services I performed. I attest to the accuracy of the note.    Attending Attestation:           Physician Attestation for Scribe:  Physician Attestation Statement for Scribe #1: I, Dr. Ridley, reviewed documentation, as scribed by Grace Kulkarni in my presence, and it is both accurate and complete.           Future Appointments  Date Time Provider Department Center   3/13/2017 11:20 AM JASMIN Mohr [facility] [place]   3/16/2017 2:00 PM Chu Mack MD [facility] [place] [facility]   3/29/2017 11:00 AM Marlyn Moctezuma PA-C [facility] [place]    3/29/2017 1:00 PM Davin Hall [facility] [place]   6/5/2017 9:20 AM LAB, [facility]   6/9/2017 1:20 PM Laura Ramos MD [facility] HEM [facility] [place]   7/18/2017 11:30 AM Constantine Bird MD [facility] [place]   10/27/2017 11:30 AM Constantine Bird MD [facility] [place]             ED Course     Clinical Impression:     1. Abdominal wall ulcer, with fat layer exposed          Disposition:   Disposition: Discharged  Condition: Stable       Lex Ridley MD  03/10/17 1954

## 2017-03-13 ENCOUNTER — OUTPATIENT CASE MANAGEMENT (OUTPATIENT)
Dept: ADMINISTRATIVE | Facility: OTHER | Age: 67
End: 2017-03-13

## 2017-03-13 NOTE — PROGRESS NOTES
"3/13/17: LCSW attempted to contact pt for follow up; no answer, left voicemail.  LCSW did read encounter about pt being admitted to ED for abdominal wall ulcer with fat layer exposed on 3/10/17.    3/6/17: LCSW contacted pt to complete SW assessment.  This is a 67 yo female pt who lives with her daughter and grandchild.  Pt is a  and her late  served in the [employer]; they have not accessed [facility].  Pt has a hx of major depression chronic and anxiety.  Pt self-reports her depression coming as a result of losing some functionality approximately six years ago.  Pt states how she used to be someone who was "constantly on the go, and to not be able to do that anymore has caused me to have depression." Pt is not currently receiving counseling or therapy for depression but she does take Lexapro as prescribed.  Pt self-reports being able to perform most of her ADL's but she does occasionally receive assistance from her daughter and grandchild.      3/3/17: LCSW attempted to contact pt; no answer, left voicemail.  "

## 2017-03-15 ENCOUNTER — OUTPATIENT CASE MANAGEMENT (OUTPATIENT)
Dept: ADMINISTRATIVE | Facility: OTHER | Age: 67
End: 2017-03-15

## 2017-03-15 NOTE — PROGRESS NOTES
03/15/17-Called and spoke to Nelly Tian. Patient has an appt with [facility] wound care on 03/20/17. Patient to start smoking cessation classes on 03/29/17. Will follow up with patient in two weeks.

## 2017-03-17 ENCOUNTER — OUTPATIENT CASE MANAGEMENT (OUTPATIENT)
Dept: ADMINISTRATIVE | Facility: OTHER | Age: 67
End: 2017-03-17

## 2017-03-17 NOTE — PROGRESS NOTES
"3/17/17: LCSW attempted to contact pt for follow up; no answer, left voicemail.  LCSW will send pt an "attempted to contact" message via pt portal.      3/13/17: LCSW attempted to contact pt for follow up; no answer, left voicemail.  LCSW did read encounter about pt being admitted to ED for abdominal wall ulcer with fat layer exposed on 3/10/17.    3/6/17: LCSW contacted pt to complete SW assessment.  This is a 65 yo female pt who lives with her daughter and grandchild.  Pt is a  and her late  served in the [employer]; they have not accessed [facility].  Pt has a hx of major depression chronic and anxiety.  Pt self-reports her depression coming as a result of losing some functionality approximately six years ago.  Pt states how she used to be someone who was "constantly on the go, and to not be able to do that anymore has caused me to have depression." Pt is not currently receiving counseling or therapy for depression but she does take Lexapro as prescribed.  Pt self-reports being able to perform most of her ADL's but she does occasionally receive assistance from her daughter and grandchild.      3/3/17: LCSW attempted to contact pt; no answer, left voicemail.  "

## 2017-03-17 NOTE — LETTER
March 17, 2017    Nelly DAVE Jaylan  [address] [place] [address]             [facility] [address] [place] [address] Dear Ms. Tian:    This is Dave Gibson LCSW and I am your  with the [facility] program.  I am writing this letter because I have been unable to reach you on the telephone.  If there are any further social needs or concerns that I can assist you with please feel free to call me at [phone].         Sincerely,    Chu Gibson III, LCSW   [facility]  [phone]

## 2017-03-18 ENCOUNTER — HOSPITAL ENCOUNTER (INPATIENT)
Facility: HOSPITAL | Age: 67
LOS: 3 days | Discharge: HOME HEALTH | DRG: 592 | End: 2017-03-21
Attending: EMERGENCY MEDICINE | Admitting: SPECIALIST
Payer: MEDICARE

## 2017-03-18 DIAGNOSIS — L98.492: Primary | ICD-10-CM

## 2017-03-18 PROBLEM — L98.499 SKIN ULCER: Status: ACTIVE | Noted: 2017-03-18

## 2017-03-18 LAB
ALBUMIN SERPL BCP-MCNC: 3 G/DL
ALP SERPL-CCNC: 75 U/L
ALT SERPL W/O P-5'-P-CCNC: 11 U/L
ANION GAP SERPL CALC-SCNC: 10 MMOL/L
AST SERPL-CCNC: 13 U/L
BASOPHILS # BLD AUTO: 0 K/UL
BASOPHILS NFR BLD: 0.2 %
BILIRUB SERPL-MCNC: 0.3 MG/DL
BUN SERPL-MCNC: 11 MG/DL
CALCIUM SERPL-MCNC: 9.6 MG/DL
CHLORIDE SERPL-SCNC: 100 MMOL/L
CO2 SERPL-SCNC: 30 MMOL/L
CREAT SERPL-MCNC: 0.7 MG/DL
CRP SERPL-MCNC: 79.3 MG/L
DIFFERENTIAL METHOD: ABNORMAL
EOSINOPHIL # BLD AUTO: 0.2 K/UL
EOSINOPHIL NFR BLD: 2.2 %
ERYTHROCYTE [DISTWIDTH] IN BLOOD BY AUTOMATED COUNT: 17 %
EST. GFR  (AFRICAN AMERICAN): >60 ML/MIN/1.73 M^2
EST. GFR  (NON AFRICAN AMERICAN): >60 ML/MIN/1.73 M^2
GLUCOSE SERPL-MCNC: 135 MG/DL
HCT VFR BLD AUTO: 38.7 %
HGB BLD-MCNC: 12 G/DL
INR PPP: 2.9
LACTATE SERPL-SCNC: 2.8 MMOL/L
LYMPHOCYTES # BLD AUTO: 1.3 K/UL
LYMPHOCYTES NFR BLD: 14.4 %
MCH RBC QN AUTO: 25.1 PG
MCHC RBC AUTO-ENTMCNC: 31 %
MCV RBC AUTO: 81 FL
MONOCYTES # BLD AUTO: 0.9 K/UL
MONOCYTES NFR BLD: 10.2 %
NEUTROPHILS # BLD AUTO: 6.8 K/UL
NEUTROPHILS NFR BLD: 73 %
PLATELET # BLD AUTO: 242 K/UL
PMV BLD AUTO: 8.6 FL
POCT GLUCOSE: 124 MG/DL (ref 70–110)
POTASSIUM SERPL-SCNC: 3.7 MMOL/L
PROT SERPL-MCNC: 7.7 G/DL
PROTHROMBIN TIME: 29.4 SEC
RBC # BLD AUTO: 4.78 M/UL
SODIUM SERPL-SCNC: 140 MMOL/L
WBC # BLD AUTO: 9.3 K/UL

## 2017-03-18 PROCEDURE — 63600175 PHARM REV CODE 636 W HCPCS: Performed by: EMERGENCY MEDICINE

## 2017-03-18 PROCEDURE — 85025 COMPLETE CBC W/AUTO DIFF WBC: CPT

## 2017-03-18 PROCEDURE — 86140 C-REACTIVE PROTEIN: CPT

## 2017-03-18 PROCEDURE — 80053 COMPREHEN METABOLIC PANEL: CPT

## 2017-03-18 PROCEDURE — 12000002 HC ACUTE/MED SURGE SEMI-PRIVATE ROOM

## 2017-03-18 PROCEDURE — 36415 COLL VENOUS BLD VENIPUNCTURE: CPT

## 2017-03-18 PROCEDURE — 25000003 PHARM REV CODE 250: Performed by: PHYSICIAN ASSISTANT

## 2017-03-18 PROCEDURE — 96365 THER/PROPH/DIAG IV INF INIT: CPT

## 2017-03-18 PROCEDURE — 25000003 PHARM REV CODE 250: Performed by: NURSE PRACTITIONER

## 2017-03-18 PROCEDURE — 99284 EMERGENCY DEPT VISIT MOD MDM: CPT | Mod: 25

## 2017-03-18 PROCEDURE — 25000003 PHARM REV CODE 250: Performed by: SPECIALIST

## 2017-03-18 PROCEDURE — 83605 ASSAY OF LACTIC ACID: CPT

## 2017-03-18 PROCEDURE — 25000003 PHARM REV CODE 250: Performed by: EMERGENCY MEDICINE

## 2017-03-18 PROCEDURE — 87040 BLOOD CULTURE FOR BACTERIA: CPT | Mod: 59

## 2017-03-18 PROCEDURE — 85610 PROTHROMBIN TIME: CPT

## 2017-03-18 PROCEDURE — 63600175 PHARM REV CODE 636 W HCPCS: Performed by: NURSE PRACTITIONER

## 2017-03-18 RX ORDER — ENOXAPARIN SODIUM 100 MG/ML
40 INJECTION SUBCUTANEOUS
Status: DISCONTINUED | OUTPATIENT
Start: 2017-03-18 | End: 2017-03-19

## 2017-03-18 RX ORDER — IBUPROFEN 200 MG
16 TABLET ORAL
Status: DISCONTINUED | OUTPATIENT
Start: 2017-03-18 | End: 2017-03-21 | Stop reason: HOSPADM

## 2017-03-18 RX ORDER — ATORVASTATIN CALCIUM 20 MG/1
20 TABLET, FILM COATED ORAL DAILY
Status: DISCONTINUED | OUTPATIENT
Start: 2017-03-19 | End: 2017-03-21 | Stop reason: HOSPADM

## 2017-03-18 RX ORDER — IPRATROPIUM BROMIDE AND ALBUTEROL SULFATE 2.5; .5 MG/3ML; MG/3ML
3 SOLUTION RESPIRATORY (INHALATION) EVERY 4 HOURS PRN
Status: DISCONTINUED | OUTPATIENT
Start: 2017-03-18 | End: 2017-03-21 | Stop reason: HOSPADM

## 2017-03-18 RX ORDER — IBUPROFEN 200 MG
24 TABLET ORAL
Status: DISCONTINUED | OUTPATIENT
Start: 2017-03-18 | End: 2017-03-21 | Stop reason: HOSPADM

## 2017-03-18 RX ORDER — FERROUS SULFATE 325(65) MG
325 TABLET, DELAYED RELEASE (ENTERIC COATED) ORAL 2 TIMES DAILY WITH MEALS
Status: DISCONTINUED | OUTPATIENT
Start: 2017-03-18 | End: 2017-03-21 | Stop reason: HOSPADM

## 2017-03-18 RX ORDER — WARFARIN 2.5 MG/1
5 TABLET ORAL DAILY
Status: DISCONTINUED | OUTPATIENT
Start: 2017-03-18 | End: 2017-03-18

## 2017-03-18 RX ORDER — ESCITALOPRAM OXALATE 10 MG/1
10 TABLET ORAL DAILY
Status: DISCONTINUED | OUTPATIENT
Start: 2017-03-19 | End: 2017-03-21 | Stop reason: HOSPADM

## 2017-03-18 RX ORDER — GLUCAGON 1 MG
1 KIT INJECTION
Status: DISCONTINUED | OUTPATIENT
Start: 2017-03-18 | End: 2017-03-21 | Stop reason: HOSPADM

## 2017-03-18 RX ORDER — ADHESIVE BANDAGE
30 BANDAGE TOPICAL DAILY PRN
Status: ON HOLD | COMMUNITY
End: 2017-04-28 | Stop reason: HOSPADM

## 2017-03-18 RX ORDER — LISINOPRIL 10 MG/1
20 TABLET ORAL DAILY
Status: DISCONTINUED | OUTPATIENT
Start: 2017-03-19 | End: 2017-03-21 | Stop reason: HOSPADM

## 2017-03-18 RX ORDER — IPRATROPIUM BROMIDE AND ALBUTEROL SULFATE 2.5; .5 MG/3ML; MG/3ML
3 SOLUTION RESPIRATORY (INHALATION) EVERY 4 HOURS PRN
Status: DISCONTINUED | OUTPATIENT
Start: 2017-03-18 | End: 2017-03-18 | Stop reason: SDUPTHER

## 2017-03-18 RX ORDER — WARFARIN SODIUM 5 MG/1
5 TABLET ORAL DAILY
Status: DISCONTINUED | OUTPATIENT
Start: 2017-03-18 | End: 2017-03-21 | Stop reason: HOSPADM

## 2017-03-18 RX ORDER — RAMELTEON 8 MG/1
8 TABLET ORAL NIGHTLY PRN
Status: DISCONTINUED | OUTPATIENT
Start: 2017-03-18 | End: 2017-03-21 | Stop reason: HOSPADM

## 2017-03-18 RX ORDER — CLONAZEPAM 1 MG/1
1 TABLET ORAL 2 TIMES DAILY PRN
Status: DISCONTINUED | OUTPATIENT
Start: 2017-03-18 | End: 2017-03-19

## 2017-03-18 RX ORDER — HYDROCODONE BITARTRATE AND ACETAMINOPHEN 10; 325 MG/1; MG/1
1 TABLET ORAL EVERY 6 HOURS PRN
Status: DISCONTINUED | OUTPATIENT
Start: 2017-03-18 | End: 2017-03-21 | Stop reason: HOSPADM

## 2017-03-18 RX ORDER — FUROSEMIDE 20 MG/1
40 TABLET ORAL 2 TIMES DAILY
Status: DISCONTINUED | OUTPATIENT
Start: 2017-03-18 | End: 2017-03-21 | Stop reason: HOSPADM

## 2017-03-18 RX ORDER — INSULIN ASPART 100 [IU]/ML
0-5 INJECTION, SOLUTION INTRAVENOUS; SUBCUTANEOUS
Status: DISCONTINUED | OUTPATIENT
Start: 2017-03-18 | End: 2017-03-21 | Stop reason: HOSPADM

## 2017-03-18 RX ORDER — ASCORBIC ACID 500 MG
500 TABLET ORAL NIGHTLY
Status: DISCONTINUED | OUTPATIENT
Start: 2017-03-18 | End: 2017-03-21 | Stop reason: HOSPADM

## 2017-03-18 RX ORDER — TRAZODONE HYDROCHLORIDE 50 MG/1
150 TABLET ORAL NIGHTLY
Status: DISCONTINUED | OUTPATIENT
Start: 2017-03-18 | End: 2017-03-21 | Stop reason: HOSPADM

## 2017-03-18 RX ORDER — POTASSIUM CHLORIDE 20 MEQ/1
20 TABLET, EXTENDED RELEASE ORAL DAILY
Status: DISCONTINUED | OUTPATIENT
Start: 2017-03-19 | End: 2017-03-21 | Stop reason: HOSPADM

## 2017-03-18 RX ADMIN — ENOXAPARIN SODIUM 40 MG: 100 INJECTION SUBCUTANEOUS at 05:03

## 2017-03-18 RX ADMIN — OXYCODONE HYDROCHLORIDE AND ACETAMINOPHEN 500 MG: 500 TABLET ORAL at 09:03

## 2017-03-18 RX ADMIN — VANCOMYCIN HYDROCHLORIDE 2000 MG: 1 INJECTION, POWDER, LYOPHILIZED, FOR SOLUTION INTRAVENOUS at 04:03

## 2017-03-18 RX ADMIN — CLONAZEPAM 1 MG: 1 TABLET ORAL at 10:03

## 2017-03-18 RX ADMIN — WARFARIN SODIUM 5 MG: 2.5 TABLET ORAL at 09:03

## 2017-03-18 RX ADMIN — SODIUM CHLORIDE 500 ML: 0.9 INJECTION, SOLUTION INTRAVENOUS at 04:03

## 2017-03-18 RX ADMIN — FUROSEMIDE 40 MG: 20 TABLET ORAL at 09:03

## 2017-03-18 RX ADMIN — HYDROCODONE BITARTRATE AND ACETAMINOPHEN 1 TABLET: 10; 325 TABLET ORAL at 05:03

## 2017-03-18 NOTE — IP AVS SNAPSHOT
[address] [place] [address]  Phone: [phone]           Patient Discharge Instructions     Our goal is to set you up for success. This packet includes information on your condition, medications, and your home care. It will help you to care for yourself so you don't get sicker and need to go back to the hospital.     Please ask your nurse if you have any questions.        There are many details to remember when preparing to leave the hospital. Here is what you will need to do:    1. Take your medicine. If you are prescribed medications, review your Medication List in the following pages. You may have new medications to  at the pharmacy and others that you'll need to stop taking. Review the instructions for how and when to take your medications. Talk with your doctor or nurses if you are unsure of what to do.     2. Go to your follow-up appointments. Specific follow-up information is listed in the following pages. Your may be contacted by a transition nurse or clinical provider about future appointments. Be sure we have all of the phone numbers to reach you, if needed. Please contact your provider's office if you are unable to make an appointment.     3. Watch for warning signs. Your doctor or nurse will give you detailed warning signs to watch for and when to call for assistance. These instructions may also include educational information about your condition. If you experience any of warning signs to your health, call your doctor.               [facility]  Unless otherwise directed by your provider, please contact [facility], our nurse care line that is available for 24/7 assistance.     [phone] (toll-free)    Registered nurses in the [facility] provide clinical advisement, health education, appointment booking, and other advisory services.                    ** Verify the list of medication(s) below is accurate and up to date.  Carry this with you in case of emergency. If your medications have changed, please notify your healthcare provider.             Medication List      START taking these medications        Additional Info                      amoxicillin-clavulanate 875-125mg 875-125 mg per tablet   Commonly known as:  AUGMENTIN   Quantity:  14 tablet   Refills:  0   Dose:  1 tablet   Indications:  Skin & soft tissue    Last time this was given:  1 tablet on 3/21/2017  8:52 AM   Instructions:  Take 1 tablet by mouth every 12 (twelve) hours.     Begin Date    AM    Noon    PM    Bedtime         CHANGE how you take these medications        Additional Info                      hydrocodone-acetaminophen 10-325mg  mg Tab   Commonly known as:  NORCO   Quantity:  120 tablet   Refills:  0   Dose:  1 tablet   What changed:  reasons to take this    Last time this was given:  1 tablet on 3/21/2017  5:58 AM   Instructions:  Take 1 tablet by mouth every 6 (six) hours as needed.     Begin Date    AM    Noon    PM    Bedtime       metoprolol succinate 25 MG 24 hr tablet   Commonly known as:  TOPROL-XL   Quantity:  45 tablet   Refills:  3   Dose:  12.5 mg   Indications:  Hold  for heart rate less than 60 or DBP < 60   What changed:  additional instructions    Last time this was given:  12.5 mg on 3/21/2017  8:52 AM   Instructions:  Take 0.5 tablets (12.5 mg total) by mouth once daily.     Begin Date    AM    Noon    PM    Bedtime       temazepam 15 mg Cap   Commonly known as:  RESTORIL   Quantity:  90 capsule   Refills:  3   What changed:  See the new instructions.    Instructions:  TAKE 1 CAPSULE ONE TIME DAILY     Begin Date    AM    Noon    PM    Bedtime       trazodone 150 MG tablet   Commonly known as:  DESYREL   Quantity:  90 tablet   Refills:  0   Dose:  150 mg   What changed:    - when to take this  - reasons to take this    Instructions:  Take 1 tablet (150 mg total) by mouth every evening.     Begin Date    AM    Noon    PM    Bedtime        warfarin 5 MG tablet   Commonly known as:  COUMADIN   Quantity:  90 tablet   Refills:  1   What changed:  See the new instructions.    Last time this was given:  5 mg on 3/20/2017  5:39 PM   Instructions:  TAKE ONE TABLET EVERY DAY OR AS DIRECTED BY COUMADIN CLINIC     Begin Date    AM    Noon    PM    Bedtime         CONTINUE taking these medications        Additional Info                      acetaminophen 325 MG tablet   Commonly known as:  TYLENOL   Refills:  0   Dose:  650 mg    Instructions:  Take 2 tablets (650 mg total) by mouth every 6 (six) hours as needed.     Begin Date    AM    Noon    PM    Bedtime       * albuterol 90 mcg/actuation inhaler   Commonly known as:  VENTOLIN HFA   Quantity:  18 each   Refills:  3    Instructions:  INHALE TWO PUFFS BY MOUTH EVERY 6 HOURS AS NEEDED FOR WHEEZING     Begin Date    AM    Noon    PM    Bedtime       * albuterol 0.63 mg/3 mL Nebu   Commonly known as:  ACCUNEB   Quantity:  75 mL   Refills:  11    Instructions:  INHALE THE CONTENTS OF 1 VIAL  BY  NEBULIZER EVERY 6 HOURS AS NEEDED     Begin Date    AM    Noon    PM    Bedtime       albuterol-ipratropium 2.5mg-0.5mg/3mL 0.5 mg-3 mg(2.5 mg base)/3 mL nebulizer solution   Commonly known as:  DUO-NEB   Quantity:  90 mL   Refills:  0    Instructions:  INHALE THE CONTENTS OF 1 VIAL VIA NEBULIZER EVERY 6 HOURS AS NEEDED FOR  WHEEZING (DO NOT USE WITH ALBUTEROL INHALER)     Begin Date    AM    Noon    PM    Bedtime       ascorbic acid (vitamin C) 500 MG tablet   Commonly known as:  VITAMIN C   Refills:  0   Dose:  500 mg    Last time this was given:  500 mg on 3/20/2017  9:43 PM   Instructions:  Take 1 tablet (500 mg total) by mouth every evening.     Begin Date    AM    Noon    PM    Bedtime       atorvastatin 20 MG tablet   Commonly known as:  LIPITOR   Quantity:  90 tablet   Refills:  3   Dose:  20 mg    Last time this was given:  20 mg on 3/21/2017  8:52 AM   Instructions:  Take 1 tablet (20 mg total) by mouth  once daily.     Begin Date    AM    Noon    PM    Bedtime       blood sugar diagnostic Strp   Commonly known as:  TRUE METRIX GLUCOSE TEST STRIP   Quantity:  300 strip   Refills:  3    Instructions:  Use to test blood sugar three times a day   DX:E11.9     Begin Date    AM    Noon    PM    Bedtime       blood-glucose meter kit   Commonly known as:  TRUE METRIX AIR GLUCOSE METER   Quantity:  1 each   Refills:  0    Instructions:  Use as instructed to test blood sugar   DX:E11.9     Begin Date    AM    Noon    PM    Bedtime       budesonide 0.5 mg/2 mL nebulizer solution   Commonly known as:  PULMICORT   Quantity:  180 mL   Refills:  4    Instructions:  INHALE THE CONTENTS OF 1 VIAL VIA NEBULIZER EVERY DAY     Begin Date    AM    Noon    PM    Bedtime       clonazePAM 1 MG tablet   Commonly known as:  KLONOPIN   Quantity:  60 tablet   Refills:  4   Dose:  1 mg    Last time this was given:  1 mg on 3/21/2017  5:58 AM   Instructions:  Take 1 tablet (1 mg total) by mouth 2 (two) times daily as needed for Anxiety.     Begin Date    AM    Noon    PM    Bedtime       collagenase ointment   Quantity:  90 g   Refills:  0    Instructions:  Apply topically once daily.     Begin Date    AM    Noon    PM    Bedtime       escitalopram oxalate 10 MG tablet   Commonly known as:  LEXAPRO   Quantity:  90 tablet   Refills:  3    Instructions:  TAKE 1 TABLET ONE TIME DAILY     Begin Date    AM    Noon    PM    Bedtime       furosemide 40 MG tablet   Commonly known as:  LASIX   Quantity:  60 tablet   Refills:  0   Dose:  40 mg    Last time this was given:  40 mg on 3/21/2017  8:52 AM   Instructions:  Take 1 tablet (40 mg total) by mouth 2 (two) times daily.     Begin Date    AM    Noon    PM    Bedtime       gabapentin 600 MG tablet   Commonly known as:  NEURONTIN   Quantity:  270 tablet   Refills:  3   Dose:  600 mg    Instructions:  Take 1 tablet (600 mg total) by mouth 3 (three) times daily.     Begin Date    AM    Noon    PM     Bedtime       lancets 33 gauge Misc   Commonly known as:  TRUEPLUS LANCETS   Quantity:  300 each   Refills:  3   Dose:  1 lancet    Instructions:  1 lancet by Misc.(Non-Drug; Combo Route) route 3 (three) times daily. To test blood sugar   DX: E11.9     Begin Date    AM    Noon    PM    Bedtime       levalbuterol 0.63 mg/3 mL nebulizer solution   Commonly known as:  XOPENEX   Quantity:  792 mL   Refills:  3    Instructions:  INHALE THE CONTENTS OF 1 VIAL BY NEBULIZATION 3 (THREE) TIMES DAILY.     Begin Date    AM    Noon    PM    Bedtime       lisinopril 20 MG tablet   Commonly known as:  PRINIVIL,ZESTRIL   Quantity:  90 tablet   Refills:  1    Last time this was given:  20 mg on 3/21/2017  8:52 AM   Instructions:  TAKE 1 TABLET ONE TIME DAILY     Begin Date    AM    Noon    PM    Bedtime       metformin 500 MG tablet   Commonly known as:  GLUCOPHAGE   Quantity:  180 tablet   Refills:  3    Instructions:  TAKE 1 TABLET TWICE DAILY WITH MEALS     Begin Date    AM    Noon    PM    Bedtime       methocarbamol 750 MG Tab   Commonly known as:  ROBAXIN   Quantity:  360 tablet   Refills:  1    Instructions:  TAKE 1 TABLET FOUR TIMES DAILY     Begin Date    AM    Noon    PM    Bedtime       miconazole NITRATE 2 % 2 % top powder   Commonly known as:  MICOTIN   Refills:  0    Instructions:  Apply topically 2 (two) times daily. Skin folds     Begin Date    AM    Noon    PM    Bedtime       MILK OF MAGNESIA 400 mg/5 mL Susp   Refills:  0   Dose:  30 mL   Generic drug:  magnesium hydroxide 400 mg/5 ml    Instructions:  Take 30 mLs by mouth daily as needed.     Begin Date    AM    Noon    PM    Bedtime       multivitamin tablet   Commonly known as:  THERAGRAN   Refills:  0   Dose:  1 tablet    Last time this was given:  1 tablet on 3/21/2017  8:52 AM   Instructions:  Take 1 tablet by mouth once daily.     Begin Date    AM    Noon    PM    Bedtime       polyethylene glycol 17 gram/dose powder   Commonly known as:  GLYCOLAX    Quantity:  1530 g   Refills:  11    Instructions:  MIX 17 GRAMS IN LIQUID AND DRINK EVERY DAY AS NEEDED     Begin Date    AM    Noon    PM    Bedtime       potassium chloride SA 20 MEQ tablet   Commonly known as:  K-UZAIR MILESOR-CON   Quantity:  30 tablet   Refills:  1   Dose:  20 mEq    Last time this was given:  20 mEq on 3/21/2017  8:52 AM   Instructions:  Take 1 tablet (20 mEq total) by mouth once daily.     Begin Date    AM    Noon    PM    Bedtime       SPIRIVA WITH HANDIHALER 18 mcg inhalation capsule   Quantity:  90 capsule   Refills:  1   Generic drug:  tiotropium    Instructions:  INHALE THE CONTENTS OF 1 CAPSULE EVERY DAY     Begin Date    AM    Noon    PM    Bedtime       * Notice:  This list has 2 medication(s) that are the same as other medications prescribed for you. Read the directions carefully, and ask your doctor or other care provider to review them with you.         Where to Get Your Medications      These medications were sent to [facility] [address]  [place] - [address]  [address] [place] [address]     Phone:  [phone]     amoxicillin-clavulanate 875-125mg 875-125 mg per tablet                  Please bring to all follow up appointments:    1. A copy of your discharge instructions.  2. All medicines you are currently taking in their original bottles.  3. Identification and insurance card.    Please arrive 15 minutes ahead of scheduled appointment time.    Please call 24 hours in advance if you must reschedule your appointment and/or time.        Your Scheduled Appointments     Mar 29, 2017 11:00 AM CDT   Established Patient Visit with JACOB Velasquez - Family Medicine ([place])    [facility]  [place] [id number] [phone]            Jun 05, 2017  9:20 AM CDT   Non-Fasting Lab with [facility] ([facility]) [address]  [place] [id number] [phone]             "Jun 09, 2017  1:20 PM CDT   Established Patient Visit with Laura Ramos MD   [facility]ology [facility] ([facility])    [address] [place] [address] Drive [address]  [facility] [address]   [phone]            Jun 23, 2017  1:30 PM CDT   Established Patient Visit with Chu Mack MD   [facility]iology ([facility])    [address]  [facility] [address]   [phone]            Jul 18, 2017 11:30 AM CDT   Established Patient Visit with MD Tara Velásquezll - Family Medicine ([facility])    [address]  [facility] [address]   [phone]              Follow-up Information     Follow up with Godwin Gipson On 3/23/2017.    Why:  hospital follow up on 3-23-17 @ 9 a.m.    Contact information:    [address]  [place] [address]  [phone] ext [phone]        Follow up with Constantine Bird MD In 1 week.    Specialty:  Family Medicine    Why:  hospital follow up- MD nurse will call to schedule the appointment     Contact information:    [address] [facility] [address]  [phone]          Follow up with [facility].    Specialty:  Home Health Services    Why:  Home Health    Contact information:    [address]  [facility] [address]  [phone]        Referrals     Future Orders    Referral to Home health         Discharge Instructions     Future Orders    Activity as tolerated     Call MD for:  redness, tenderness, or signs of infection (pain, swelling, redness, odor or green/yellow discharge around incision site)     Call MD for:  severe uncontrolled pain     COMMODE FOR HOME USE     Questions:    Type:  Heavy duty drop arm    Height:  5' 8" (1.727 m)    Weight:  142 kg (313 lb)    Does patient have medical equipment at home?:      Length of need (1-99 months):  99    Diet general     Questions:    Total calories:      Fat restriction, if any:      Protein restriction, if any:      Na restriction, if any:      Fluid restriction:  " "    Additional restrictions:  Cardiac (Low Na/Chol)        Primary Diagnosis     Your primary diagnosis was:  Nonhealing Skin Ulcer, Limited To Breakdown Of Skin      Admission Information     Date & Time Provider Department CSN    3/18/2017  1:22 PM Diaz Pa MD [facility] [id number]      Care Providers     Provider Role Specialty Primary office phone    Diaz Pa MD Attending Provider Hospitalist [phone]      Your Vitals Were     BP Pulse Temp Resp Height Weight    134/54 (BP Location: Right arm, Patient Position: Lying, BP Method: Automatic) 76 98 °F (36.7 °C) (Oral) 18 5' 8" (1.727 m) 142 kg (313 lb)    SpO2 BMI             94% 47.59 kg/m2         Recent Lab Values        2/24/2014 7/3/2014 10/28/2014 9/10/2015 3/22/2016 10/20/2016 10/26/2016 2/14/2017      8:38 AM  8:31 AM  8:00 AM 10:02 AM  7:46 AM 11:45 AM 11:11 AM  3:22 PM    A1C 5.8 5.5 5.6 5.6 5.1 6.2 6.3 (H) 6.1    Comment for A1C at 11:45 AM on 10/20/2016:  According to ADA guidelines, hemoglobin A1C <7.0% represents  optimal control in non-pregnant diabetic patients.  Different  metrics may apply to specific populations.   Standards of Medical Care in Diabetes - 2016.  For the purpose of screening for the presence of diabetes:  <5.7%     Consistent with the absence of diabetes  5.7-6.4%  Consistent with increasing risk for diabetes   (prediabetes)  >or=6.5%  Consistent with diabetes  Currently no consensus exists for use of hemoglobin A1C  for diagnosis of diabetes for children.      Comment for A1C at 11:11 AM on 10/26/2016:  According to ADA guidelines, hemoglobin A1C <7.0% represents  optimal control in non-pregnant diabetic patients.  Different  metrics may apply to specific populations.   Standards of Medical Care in Diabetes - 2016.  For the purpose of screening for the presence of diabetes:  <5.7%     Consistent with the absence of diabetes  5.7-6.4%  Consistent with increasing risk for diabetes "   (prediabetes)  >or=6.5%  Consistent with diabetes  Currently no consensus exists for use of hemoglobin A1C  for diagnosis of diabetes for children.      Comment for A1C at  3:22 PM on 2/14/2017:  According to ADA guidelines, hemoglobin A1C <7.0% represents  optimal control in non-pregnant diabetic patients.  Different  metrics may apply to specific populations.   Standards of Medical Care in Diabetes - 2016.  For the purpose of screening for the presence of diabetes:  <5.7%     Consistent with the absence of diabetes  5.7-6.4%  Consistent with increasing risk for diabetes   (prediabetes)  >or=6.5%  Consistent with diabetes  Currently no consensus exists for use of hemoglobin A1C  for diagnosis of diabetes for children.        Pending Labs     Order Current Status    Blood Culture #1 Preliminary result    Blood Culture #2 Preliminary result      Allergies as of 3/21/2017        Reactions    Dilaudid [Hydromorphone] Anaphylaxis    Other reaction(s): Anaphylaxis  Other reaction(s): Unknown      Advance Directives     An advance directive is a document which, in the event you are no longer able to make decisions for yourself, tells your healthcare team what kind of treatment you do or do not want to receive, or who you would like to make those decisions for you.  If you do not currently have an advance directive, [facility] encourages you to create one.  For more information call:  [phone]), [phone] ([phone]),  or log on to [email or web address].        Smoking Cessation     If you would like to quit smoking:   You may be eligible for free services if you are a [place] resident and started smoking cigarettes before September 1, 1988.  Call the Smoking Cessation Trust (SCT) toll free at (269) 772-2495 or (522) 865-9765.   Call 4-800-QUIT-NOW if you do not meet the above criteria.            Language Assistance Services     ATTENTION: Language assistance services are available, free of  charge. Please call [phone].      ATENCIÓN: Si melina booker, tiene a dimas disposición servicios gratuitos de asistencia lingüística. Llame al [phone].     [facility] Ý: N?u b?n nói Ti?ng Vi?t, có các d?ch v? h? tr? ngôn ng? mi?n phí dành cho b?n. G?i s? [phone].        Heart Failure Education       Heart Failure: Being Active  You have a condition called heart failure. Being active doesnt mean that you have to wear yourself out. Even a little movement each day helps to strengthen your heart. If you cant get out to exercise, you can do simple stretching and strengthening exercises at home. These are good ways to keep you well-conditioned and prevent you and your heart from becoming excessively weak.    Ideas to get you started  · Add a little movement to things you do now. Walk to mail letters. Park your car at the far end of the parking lot and walk to the store. Walk up a flight of stairs instead of taking the elevator.  · Choose activities you enjoy. You might walk, swim, or ride an exercise bike. Things like gardening and washing the car count, too. Other possibilities include: washing dishes, walking the dog, walking around the mall, and doing aerobic activities with friends.  · Join a group exercise program at a [facility] or [facility], a senior center, or a community center. Or look into a hospital cardiac rehabilitation program. Ask your doctor if you qualify.  Tips to keep you going  · Get up and get dressed each day. Go to a coffee shop and read a newspaper or go somewhere that you'll be in the presence of other active people. Youll feel more like being active.  · Make a plan. Choose one or more activities that you enjoy and that you can easily do. Then plan to do at least one each day. You might write your plan on a calendar.  · Go with a friend or a group if you like company. This can help you feel supported and stay motivated, too.  · Plan social events that you enjoy. This will keep you mentally  engaged as well as physically motivated to do things you find pleasure in.  For your safety  · Talk with your healthcare provider before starting an exercise program.  · Exercise indoors when its too hot or too cold outside, or when the air quality is poor. Try walking at a shopping mall.  · Wear socks and sturdy shoes to maintain your balance and prevent falls.  · Start slowly. Do a few minutes several times a day at first. Increase your time and speed little by little.  · Stop and rest whenever you feel tired or get short of breath.  · Dont push yourself on days when you dont feel well.  Date Last Reviewed: 3/20/2016  © 5414-5302 [employer]. [address] [place] [address], [place] [address]. All rights reserved. This information is not intended as a substitute for professional medical care. Always follow your healthcare professional's instructions.              Heart Failure: Evaluating Your Heart  You have a condition called heart failure. To evaluate your condition, your doctor will examine you, ask questions, and do some tests. Along with looking for signs of heart failure, the doctor looks for any other health problems that may have led to heart failure. The results of your evaluation will help your doctor form a treatment plan.  Health history and physical exam  Your visit will start with a health history. Tell the doctor about any symptoms youve noticed and about all medicines you take. Then youll have a physical exam. This includes listening to your heartbeat and breathing. Youll also be checked for swelling (edema) in your legs and neck. When you have fluid buildup or fluid in the lungs, it may be called congestive heart failure.  Diagnosing heart failure     During an echocardiogram, sound waves bounce off the heart. These are converted into a picture on the screen.   The following may be done to help your doctor form a diagnosis:  · X-rays show the size and shape of your heart. These  pictures can also show fluid in your lungs.  · An electrocardiogram (ECG or EKG) shows the pattern of your heartbeat. Small pads (electrodes) are placed on your chest, arms, and legs. Wires connect the pads to the ECG machine, which records your hearts electrical signals. This can give the doctor information about heart function.  · An echocardiogram uses ultrasound waves to show the structure and movement of your heart muscle. This shows how well the heart pumps. It also shows the thickness of the heart walls, and if the heart is enlarged. It is one of the most useful, non-invasive tests as it provides information about the heart's general function. This helps your doctor make treatment decisions.  · Lab tests evaluate small amounts of blood or urine for signs of problems. A BNP lab test can help diagnose and evaluate heart failure. BNP stands for B-type natriuretic peptide. The ventricles secrete more BNP when heart failure worsens. Lab tests can also provide information about metabolic dysfunction or heart dysfunction.  Your treatment plan  Based on the results of your evaluation and tests, your doctor will develop a treatment plan. This plan is designed to relieve some of your heart failure symptoms and help make you more comfortable. Your treatment plan may include:  · Medicine to help your heart work better and improve your quality of life  · Changes in what you eat and drink to help prevent fluid from backing up in your body  · Daily monitoring of your weight and heart failure symptoms to see how well your treatment plan is working  · Exercise to help you stay healthy  · Help with quitting smoking  · Emotional and psychological support to help adjust to the changes  · Referrals to other specialists to make sure you are being treated comprehensively  Date Last Reviewed: 3/21/2016  © 4768-7465 [employer]. [address] [place] [address], [place] [address]. All rights reserved. This information is not  intended as a substitute for professional medical care. Always follow your healthcare professional's instructions.              Heart Failure: Making Changes to Your Diet  You have a condition called heart failure. When you have heart failure, excess fluid is more likely to build up in your body because your heart isn't working well. This makes the heart work harder to pump blood. Fluid buildup causes symptoms such as shortness of breath and swelling (edema). This is often referred to as congestive heart failure or CHF. Controlling the amount of salt (sodium) you eat may help stop fluid from building up. Your doctor may also tell you to reduce the amount of fluid you drink.  Reading food labels    Your healthcare provider will tell you how much sodium you can eat each day. Read food labels to keep track. Keep in mind that certain foods are high in salt. These include canned, frozen, and processed foods. Check the amount of sodium in each serving. Watch out for high-sodium ingredients. These include MSG (monosodium glutamate), baking soda, and sodium phosphate.   Eating less salt  Give yourself time to get used to eating less salt. It may take a little while. Here are some tips to help:  · Take the saltshaker off the table. Replace it with salt-free herb mixes and spices.  · Eat fresh or plain frozen vegetables. These have much less salt than canned vegetables.  · Choose low-sodium snacks like sodium-free pretzels, crackers, or air-popped popcorn.  · Dont add salt to your food when youre cooking. Instead, season your foods with pepper, lemon, garlic, or onion.  · When you eat out, ask that your food be cooked without added salt.  · Avoid eating fried foods as these often have a great deal of salt.  If youre told to limit fluids  You may need to limit how much fluid you have to help prevent swelling. This includes anything that is liquid at room temperature, such as ice cream and soup. If your doctor tells you to  limit fluid, try these tips:  · Measure drinks in a measuring cup before you drink them. This will help you meet daily goals.  · Chill drinks to make them more refreshing.  · Suck on frozen lemon wedges to quench thirst.  · Only drink when youre thirsty.  · Chew sugarless gum or suck on hard candy to keep your mouth moist.  · Weigh yourself daily to know if your body's fluid content is rising.  My sodium goal  Your healthcare provider may give you a sodium goal to meet each day. This includes sodium found in food as well as salt that you add. My goal is to eat no more than ___________ mg of sodium per day.     When to call your doctor  Call your doctor right away if you have any symptoms of worsening heart failure. These can include:  · Sudden weight gain  · Increased swelling of your legs or ankles  · Trouble breathing when youre resting or at night  · Increase in the number of pillows you have to sleep on  · Chest pain, pressure, discomfort, or pain in the jaw, neck, or back   Date Last Reviewed: 3/21/2016  © 4584-8278 [employer]. [address] [place] [address]. All rights reserved. This information is not intended as a substitute for professional medical care. Always follow your healthcare professional's instructions.              Heart Failure: Medicines to Help Your Heart    You have a condition called heart failure (also known as congestive heart failure, or CHF). Your doctor will likely prescribe medicines for heart failure and any underlying health problems you have. Most heart failure patients take one or more types of medicinen. Your healthcare provider will work to find the combination of medicines that works best for you.  Heart failure medicines  Here are the most common heart failure medicines:  · ACE inhibitors lower blood pressure and decrease strain on the heart. This makes it easier for the heart to pump. Angiotensin receptor blockers have similar effects. These are  prescribed for some patients instead of ACE inhibitors.  · Beta-blockers relieve stress on the heart. They also improve symptoms. They may also improve the heart's pumping action over time.  · Diuretics (also called water pills) help rid your body of excess water. This can help rid your body of swelling (edema). Having less fluid to pump means your heart doesnt have to work as hard. Some diuretics make your body lose a mineral called potassium. Your doctor will tell you if you need to take supplements or eat more foods high in potassium.  · Digoxin helps your heart pump with more strength. This helps your heart pump more blood with each beat. So, more oxygen-rich blood travels to the rest of the body.  · Aldosterone antagonists help alter hormones and decrease strain on the heart.  · Hydralazine and nitrates are two separate medicines used together to treat heart failure. They may come in one combination pill. They lower blood pressure and decrease how hard the heart has to pump.  Medicines for related conditions  Controlling other heart problems helps keep heart failure under control, too. Depending on other heart problems you have, medicines may be prescribed to:  · Lower blood pressure (antihypertensives).  · Lower cholesterol levels (statins).  · Prevent blood clots (anticoagulants or aspirin).  · Keep the heartbeat steady (antiarrhythmics).  Date Last Reviewed: 3/5/2016  © 2172-2438 [employer]. [address] [place] [address], [place] [address]. All rights reserved. This information is not intended as a substitute for professional medical care. Always follow your healthcare professional's instructions.              Heart Failure: Procedures That May Help    The heart is a muscle that pumps oxygen-rich blood to all parts of the body. When you have heart failure, the heart is not able to pump as well as it should. Blood and fluid may back up into the lungs (congestive heart failure), and some parts of  the body dont get enough oxygen-rich blood to work normally. These problems lead to the symptoms of heart failure.     Certain procedures may help the heart pump better in some cases of heart failure. Some procedures are done to treat health problems that may have caused the heart failure such as coronary artery disease or heart rhythm problems. For more serious heart failure, other options are available.  Treating artery and valve problems  If you have coronary artery disease or valve disease, procedures may be done to improve blood flow. This helps the heart pump better, which can improve heart failure symptoms. First, your doctor may do a cardiac catheterization to help detect clogged blood vessels or valve damage. During this procedure, a  thin tube (catheter) in inserted into a blood vessel and guided to the heart. There a dye is injected and a special type of X-ray (angiogram) is taken of the blood vessels. Procedures to open a blocked artery or fix damaged valves can also be done using catheterization.  · Angioplasty uses a balloon-tipped instrument at the end of the catheter. The balloon is inflated to widen the narrowed artery. In many cases, a stent is expanded to further support the narrowed artery. A stent is a metal mesh tube.  · Valve surgery repairs or replacement of faulty valves can also be done during catheterization so blood can flow properly through the chambers of the heart.  Bypass surgery is another option to help treat blocked arteries. It uses a healthy blood vessel from elsewhere in the body. The healthy blood vessel is attached above and below the blocked area so that blood can flow around the blocked artery.  Treating heart rhythm problems  A device may be placed in the chest to help a weak heart maintain a healthy, heartbeat so the heart can pump more effectively:  · Pacemaker. A pacemaker is an implanted device that regulates your heartbeat electronically. It monitors your heart's  rhythm and generates a painless electric impulse that helps the heart beat in a regular rhythm. A pacemaker is programmed to meet your specific heart rhythm needs.  · Biventricular pacing/cardiac resynchronization therapy. A type of pacemaker that paces both pumping chambers of the heart at the same time to coordinate contractions and to improve the heart's function. Some people with heart failure are candidates for this therapy.  · Implantable cardioverter defibrillator. A device similar to a pacemaker that senses when the heart is beating too fast and delivers an electrical shock to convert the fast rhythm to a normal rhythm. This can be a life saving device.  In severe cases  In more serious cases of heart failure when other treatments no longer work, other options may include:  · Ventricular assist devices (VADs). These are mechanical devices used to take over the pumping function for one or both of the heart's ventricles, or pumping chambers. A VAD may be necessary when heart failure progresses to the point that medicines and other treatments no longer help. In some cases, a VAD may be used as a bridge to transplant.  · Heart transplant. This is replacing the diseased heart with a healthy one from a donor. This is an option for a few people who are very sick. A heart transplant is very serious and not an option for all patients. Your doctor can tell you more.  Date Last Reviewed: 3/20/2016  © 8339-4896 [employer]. [address] [place] [address], [place] [address]. All rights reserved. This information is not intended as a substitute for professional medical care. Always follow your healthcare professional's instructions.              Heart Failure: Tracking Your Weight  You have a condition called heart failure. When you have heart failure, a sudden weight gain or a steady rise in weight is a warning sign that your body is retaining too much water and salt. This could mean your heart failure is getting  worse. If left untreated, it can cause problems for your lungs and result in shortness of breath. Weighing yourself each day is the best way to know if youre retaining water. If your weight goes up quickly, call your doctor. You will be given instructions on how to get rid of the excess water. You will likely need medicines and to avoid salt. This will help your heart work better.  Call your doctor if you gain more than 2 pounds in 1 day, more than 5 pounds in 1 week, or whatever weight gain you were told to report by your doctor. This is often a sign of worsening heart failure and needs to be evaluated and treated. Your doctor will tell you what to do next.   Tips for weighing yourself    · Weigh yourself at the same time each morning, wearing the same clothes. Weigh yourself after urinating and before eating.  · Use the same scale each day. Make sure the numbers are easy to read. Put the scale on a flat, hard surface -- not on a rug or carpet.  · Do not stop weighing yourself. If you forget one day, weigh again the next morning.  How to use your weight chart  · Keep your weight chart near the scale. Write your weight on the chart as soon as you get off the scale.  · Fill in the month and the start date on the chart. Then write down your weight each day. Your chart will look like this:    · If you miss a day, leave the space blank. Weigh yourself the next day and write your weight in the next space.  · Take your weight chart with you when you go to see your doctor.  Date Last Reviewed: 3/20/2016  © 1861-2081 [employer]. [address] [place] [address]. All rights reserved. This information is not intended as a substitute for professional medical care. Always follow your healthcare professional's instructions.              Heart Failure: Warning Signs of a Flare-Up  You have a condition called heart failure. Once you have heart failure, flare-ups can happen. Below are signs that can mean  your heart failure is getting worse. If you notice any of these warning signs, call your healthcare provider.  Swelling    · Your feet, ankles, or lower legs get puffier.  · You notice skin changes on your lower legs.  · Your shoes feel too tight.  · Your clothes are tighter in the waist.  · You have trouble getting rings on or off your fingers.  Shortness of breath  · You have to breathe harder even when youre doing your normal activities or when youre resting.  · You are short of breath walking up stairs or even short distances.  · You wake up at night short of breath or coughing.  · You need to use more pillows or sit up to sleep.  · You wake up tired or restless.  Other warning signs  · You feel weaker, dizzy, or more tired.  · You have chest pain or changes in your heartbeat.  · You have a cough that wont go away.  · You cant remember things or dont feel like eating.  Tracking your weight  Gaining weight is often the first warning sign that heart failure is getting worse. Gaining even a few pounds can be a sign that your body is retaining excess water and salt. Weighing yourself each day in the morning after you urinate and before you eat, is the best way to know if you're retaining water. Get a scale that is easy to read and make sure you wear the same clothes and use the same scale every time you weigh. Your healthcare provider will show you how to track your weight. Call your doctor if you gain more than 2 pounds in 1 day, 5 pounds in 1 week, or whatever weight gain you were told to report by your doctor. This is often a sign of worsening heart failure and needs to be evaluated and treated before it compromises your breathing. Your doctor will tell you what to do next.    Date Last Reviewed: 3/15/2016  © 3374-9172 [employer]. [address] [place] [address], [place] [address]. All rights reserved. This information is not intended as a substitute for professional medical care. Always follow your  healthcare professional's instructions.              Pneumonmia Discharge Instructions                Coumadin Discharge Instructions                         Diabetes Discharge Instructions                                    [facility] complies with applicable Federal civil rights laws and does not discriminate on the basis of race, color, national origin, age, disability, or sex.

## 2017-03-18 NOTE — ED PROVIDER NOTES
"Encounter Date: 3/18/2017    SCRIBE #1 NOTE: I, Adrienne Barrera , am scribing for, and in the presence of,  Dr. Ridley . I have scribed the entire note.       History     Chief Complaint   Patient presents with    abdominal wound     Review of patient's allergies indicates:   Allergen Reactions    Dilaudid [hydromorphone] Anaphylaxis     Other reaction(s): Anaphylaxis  Other reaction(s): Unknown     HPI Comments:     03/18/2017  1:55 PM     Chief Complaint: Abdominal wound       The patient is a 66 y.o. Female with a PMHx of DM, HTN, and morbid obesity who is presenting with the gradual worsening of multiple ulcerations to the L panis. Per pt's daughter ulcer progressively worsened and the area is now "extremely tender, erythematous, and warm to the touch". She denies any exacerbating or mitigating factors including frequent wound cleanings and NORCOs for pain control. Associated sx include a fever which has since resolved. The daughter voices concern about keeping the mother at home and feels that the wound needs to be treated as in pt. Pt was seen at this facility x 8 days ago for the same issue and denies improvement with OP PO clindamycin. No pertinent past surgical hx. No pertinent social hx.           The history is provided by the patient, a relative and medical records.     Past Medical History:   Diagnosis Date    *Atrial flutter     Anxiety     Arthritis     Asthma     Atrial fibrillation     Back pain     Cataract     OD    CHF (congestive heart failure)     COPD (chronic obstructive pulmonary disease)     Depression     Diabetes mellitus     Emphysema of lung     Heart failure     Hernia     Hypercapnic respiratory failure, chronic 11/16/2016    Hyperlipidemia     Hypertension     Iron deficiency anemia 2/3/2016    Myocardial infarction     Obesity     Peripheral vascular disease     Pneumonia     Polyneuropathy     Retinal detachment     OS    Skin ulcer 3/18/2017    Tobacco " dependence     Type II 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, not stated as uncontrolled      Past Surgical History:   Procedure Laterality Date    CATARACT EXTRACTION Left     OS      SECTION      x2    EYE SURGERY      HYSTERECTOMY      OOPHORECTOMY      one ovary conserved    RETINAL DETACHMENT SURGERY      buckle --OS    TONSILLECTOMY       Family History   Problem Relation Age of Onset    Arthritis Father     Heart disease Father     Early death Sister 30     heart disease    Heart disease Sister 32     MI    No Known Problems Brother     No Known Problems Daughter     Blindness Son      due to accident//    Arthritis Sister     Heart disease Sister     Crohn's disease Sister     Cancer Brother      asbestosis    Alcohol abuse Mother     Breast cancer Neg Hx     Glaucoma Neg Hx     Macular degeneration Neg Hx     Retinal detachment Neg Hx     Amblyopia Neg Hx     Diabetes Neg Hx     Cataracts Neg Hx     Hypertension Neg Hx     Strabismus Neg Hx     Stroke Neg Hx     Thyroid disease Neg Hx      Social History   Substance Use Topics    Smoking status: Current Some Day Smoker     Packs/day: 0.25     Years: 50.00     Types: Cigarettes     Start date: 1968     Last attempt to quit: 2015    Smokeless tobacco: Former User     Quit date: 2017      Comment: 1 ppd for 20 years    Alcohol use No     Review of Systems   Constitutional: Negative for fever.   HENT: Negative for sore throat.    Eyes: Negative for visual disturbance.   Respiratory: Negative for shortness of breath.    Cardiovascular: Negative for chest pain.   Gastrointestinal: Negative for nausea.   Genitourinary: Negative for dysuria.   Musculoskeletal: Negative for back pain.   Skin: Positive for wound (pressure ulcers noted to the L abdominal wall ). Negative for rash.   Neurological: Negative for weakness.   Hematological: Does not bruise/bleed easily.    Psychiatric/Behavioral: Negative for confusion.       Physical Exam   Initial Vitals   BP Pulse Resp Temp SpO2   03/18/17 1316 03/18/17 1316 03/18/17 1316 03/18/17 1316 03/18/17 1316   110/53 98 18 97.6 °F (36.4 °C) 98 %     Physical Exam    Nursing note and vitals reviewed.  Constitutional: She appears well-developed and well-nourished.   Morbidly obese with large panis.    HENT:   Head: Normocephalic and atraumatic.   Eyes: EOM are normal. Pupils are equal, round, and reactive to light.   Neck: Normal range of motion. Neck supple.   Pulmonary/Chest: Breath sounds normal. She has no wheezes. She has no rhonchi. She has no rales.   Musculoskeletal: Normal range of motion. She exhibits no edema or tenderness.   Neurological: She is alert and oriented to person, place, and time. She has normal strength. No sensory deficit.   Skin: Skin is warm and dry. There is erythema.   Multiple stage III pressure ulcer present to the L abdominal wall some necrotic tissue and surrounding erythema. No foul odor. Minimal tenderness with palpation. There is minimal warmth.            Psychiatric: She has a normal mood and affect.             03/10/17:       3/18/17:        ED Course   Procedures  Labs Reviewed   CBC W/ AUTO DIFFERENTIAL - Abnormal; Notable for the following:        Result Value    MCV 81 (*)     MCH 25.1 (*)     MCHC 31.0 (*)     RDW 17.0 (*)     MPV 8.6 (*)     Lymph% 14.4 (*)     All other components within normal limits   COMPREHENSIVE METABOLIC PANEL - Abnormal; Notable for the following:     CO2 30 (*)     Glucose 135 (*)     Albumin 3.0 (*)     All other components within normal limits   C-REACTIVE PROTEIN - Abnormal; Notable for the following:     CRP 79.3 (*)     All other components within normal limits   LACTIC ACID, PLASMA - Abnormal; Notable for the following:     Lactate (Lactic Acid) 2.8 (*)     All other components within normal limits   CULTURE, BLOOD   CULTURE, BLOOD             Medical Decision  Making:   History:   Old Medical Records: I decided to obtain old medical records.  Initial Assessment:   Patient with worsening left abdominal ulceration with surrounding erythema concerning for cellulitis.  Failed outpatient antibiotic.  We'll admit for IV antibiotics and evaluation by infectious disease.  Discussed with hospitalist who agrees to admit the patient.            Scribe Attestation:   Scribe #1: I performed the above scribed service and the documentation accurately describes the services I performed. I attest to the accuracy of the note.    Attending Attestation:           Physician Attestation for Scribe:  Physician Attestation Statement for Scribe #1: I, Dr. Ridley, reviewed documentation, as scribed by Adrienne Barrera in my presence, and it is both accurate and complete.                 ED Course     Clinical Impression:   The encounter diagnosis was Skin ulcer, with fat layer exposed.           Lex Ridley MD  03/18/17 4081

## 2017-03-18 NOTE — ED NOTES
AAOx4, skin warm/dry, respirations even/unlabored.  Appears in mild distress.  Broad cellulitic-appearing area on left buttocks.

## 2017-03-18 NOTE — CONSULTS
Nelly Tian [id number] is a 66 y.o. female who has been consulted for vancomycin dosing.    The patient has the following labs:     Date Creatinine (mg/dl)    BUN WBC Count   3/18/2017 Estimated Creatinine Clearance: 120.3 mL/min (based on Cr of 0.7). Lab Results   Component Value Date    BUN 11 03/18/2017     Lab Results   Component Value Date    WBC 9.30 03/18/2017        Current weight is (!) 142 kg (313 lb)    The patient received  2000 mg on 03/18 at 1639 (if pt has already received first dose including doses in ED).      The patient will be started on vancomycin at a dose of 1250 mg every 8 hours.  The vancomycin trough has been ordered for 03/19 at 1630.  Target trough goal is 10 to 15 mg/dL for cellulitis.    Patient will be followed by pharmacy for changes in renal function, toxicity, and efficacy.   Thank you for allowing us to participate in this patient's care.     Michael KongD

## 2017-03-19 PROBLEM — F33.1 MODERATE EPISODE OF RECURRENT MAJOR DEPRESSIVE DISORDER: Status: RESOLVED | Noted: 2017-02-16 | Resolved: 2017-03-19

## 2017-03-19 PROBLEM — I50.33 ACUTE ON CHRONIC DIASTOLIC CONGESTIVE HEART FAILURE: Status: RESOLVED | Noted: 2017-02-14 | Resolved: 2017-03-19

## 2017-03-19 PROBLEM — L89.93 PRESSURE ULCER, STAGE 3: Status: ACTIVE | Noted: 2017-03-19

## 2017-03-19 PROBLEM — L98.491: Status: ACTIVE | Noted: 2017-03-18

## 2017-03-19 LAB
ANION GAP SERPL CALC-SCNC: 9 MMOL/L
BASOPHILS # BLD AUTO: 0 K/UL
BASOPHILS NFR BLD: 0.3 %
BUN SERPL-MCNC: 10 MG/DL
CALCIUM SERPL-MCNC: 9.3 MG/DL
CHLORIDE SERPL-SCNC: 101 MMOL/L
CO2 SERPL-SCNC: 30 MMOL/L
CREAT SERPL-MCNC: 0.6 MG/DL
DIFFERENTIAL METHOD: ABNORMAL
EOSINOPHIL # BLD AUTO: 0.4 K/UL
EOSINOPHIL NFR BLD: 4.4 %
ERYTHROCYTE [DISTWIDTH] IN BLOOD BY AUTOMATED COUNT: 16.9 %
EST. GFR  (AFRICAN AMERICAN): >60 ML/MIN/1.73 M^2
EST. GFR  (NON AFRICAN AMERICAN): >60 ML/MIN/1.73 M^2
GLUCOSE SERPL-MCNC: 117 MG/DL
HCT VFR BLD AUTO: 34.3 %
HGB BLD-MCNC: 10.8 G/DL
INR PPP: 2.6
LACTATE SERPL-SCNC: 1.1 MMOL/L
LYMPHOCYTES # BLD AUTO: 1.6 K/UL
LYMPHOCYTES NFR BLD: 17.3 %
MCH RBC QN AUTO: 25.5 PG
MCHC RBC AUTO-ENTMCNC: 31.3 %
MCV RBC AUTO: 82 FL
MONOCYTES # BLD AUTO: 1.1 K/UL
MONOCYTES NFR BLD: 12.6 %
NEUTROPHILS # BLD AUTO: 6 K/UL
NEUTROPHILS NFR BLD: 65.4 %
PLATELET # BLD AUTO: 243 K/UL
PMV BLD AUTO: 9 FL
POCT GLUCOSE: 128 MG/DL (ref 70–110)
POCT GLUCOSE: 95 MG/DL (ref 70–110)
POCT GLUCOSE: 97 MG/DL (ref 70–110)
POTASSIUM SERPL-SCNC: 3.8 MMOL/L
PROTHROMBIN TIME: 26.1 SEC
RBC # BLD AUTO: 4.21 M/UL
SODIUM SERPL-SCNC: 140 MMOL/L
VANCOMYCIN TROUGH SERPL-MCNC: 16.9 UG/ML
WBC # BLD AUTO: 9.1 K/UL

## 2017-03-19 PROCEDURE — 85025 COMPLETE CBC W/AUTO DIFF WBC: CPT

## 2017-03-19 PROCEDURE — 63600175 PHARM REV CODE 636 W HCPCS: Performed by: SPECIALIST

## 2017-03-19 PROCEDURE — 25000003 PHARM REV CODE 250: Performed by: PHYSICIAN ASSISTANT

## 2017-03-19 PROCEDURE — 80202 ASSAY OF VANCOMYCIN: CPT

## 2017-03-19 PROCEDURE — 25000003 PHARM REV CODE 250: Performed by: SPECIALIST

## 2017-03-19 PROCEDURE — 83605 ASSAY OF LACTIC ACID: CPT

## 2017-03-19 PROCEDURE — 80048 BASIC METABOLIC PNL TOTAL CA: CPT

## 2017-03-19 PROCEDURE — 27000221 HC OXYGEN, UP TO 24 HOURS

## 2017-03-19 PROCEDURE — 12000002 HC ACUTE/MED SURGE SEMI-PRIVATE ROOM

## 2017-03-19 PROCEDURE — 85610 PROTHROMBIN TIME: CPT

## 2017-03-19 PROCEDURE — 25000003 PHARM REV CODE 250: Performed by: NURSE PRACTITIONER

## 2017-03-19 PROCEDURE — 94761 N-INVAS EAR/PLS OXIMETRY MLT: CPT

## 2017-03-19 PROCEDURE — 36415 COLL VENOUS BLD VENIPUNCTURE: CPT

## 2017-03-19 PROCEDURE — 99900035 HC TECH TIME PER 15 MIN (STAT)

## 2017-03-19 RX ORDER — CLONAZEPAM 1 MG/1
1 TABLET ORAL 3 TIMES DAILY
Status: DISCONTINUED | OUTPATIENT
Start: 2017-03-19 | End: 2017-03-21 | Stop reason: HOSPADM

## 2017-03-19 RX ADMIN — HYDROCODONE BITARTRATE AND ACETAMINOPHEN 1 TABLET: 10; 325 TABLET ORAL at 09:03

## 2017-03-19 RX ADMIN — VANCOMYCIN HYDROCHLORIDE 1250 MG: 1 INJECTION, POWDER, LYOPHILIZED, FOR SOLUTION INTRAVENOUS at 12:03

## 2017-03-19 RX ADMIN — WARFARIN SODIUM 5 MG: 2.5 TABLET ORAL at 04:03

## 2017-03-19 RX ADMIN — VANCOMYCIN HYDROCHLORIDE 1250 MG: 1 INJECTION, POWDER, LYOPHILIZED, FOR SOLUTION INTRAVENOUS at 04:03

## 2017-03-19 RX ADMIN — FUROSEMIDE 40 MG: 20 TABLET ORAL at 09:03

## 2017-03-19 RX ADMIN — CLONAZEPAM 1 MG: 1 TABLET ORAL at 09:03

## 2017-03-19 RX ADMIN — ATORVASTATIN CALCIUM 20 MG: 20 TABLET, FILM COATED ORAL at 09:03

## 2017-03-19 RX ADMIN — HYDROCODONE BITARTRATE AND ACETAMINOPHEN 1 TABLET: 10; 325 TABLET ORAL at 04:03

## 2017-03-19 RX ADMIN — OXYCODONE HYDROCHLORIDE AND ACETAMINOPHEN 500 MG: 500 TABLET ORAL at 09:03

## 2017-03-19 RX ADMIN — THERA TABS 1 TABLET: TAB at 09:03

## 2017-03-19 RX ADMIN — VANCOMYCIN HYDROCHLORIDE 1250 MG: 1 INJECTION, POWDER, LYOPHILIZED, FOR SOLUTION INTRAVENOUS at 09:03

## 2017-03-19 RX ADMIN — METOPROLOL SUCCINATE 12.5 MG: 25 TABLET, EXTENDED RELEASE ORAL at 12:03

## 2017-03-19 RX ADMIN — LISINOPRIL 20 MG: 10 TABLET ORAL at 09:03

## 2017-03-19 RX ADMIN — POTASSIUM CHLORIDE 20 MEQ: 1500 TABLET, EXTENDED RELEASE ORAL at 09:03

## 2017-03-19 RX ADMIN — HYDROCODONE BITARTRATE AND ACETAMINOPHEN 1 TABLET: 10; 325 TABLET ORAL at 12:03

## 2017-03-19 RX ADMIN — HYDROCODONE BITARTRATE AND ACETAMINOPHEN 1 TABLET: 10; 325 TABLET ORAL at 10:03

## 2017-03-19 NOTE — PLAN OF CARE
Problem: Patient Care Overview  Goal: Plan of Care Review  Outcome: Ongoing (interventions implemented as appropriate)  Plan of care discussed with pt. Pt ambulates to bed side commode with stand by assist, fall precautions in place.  PRN meds given for anxiety and pain. Wound draining sanguinous fluid, pad placed underneath. Pt remains free and clear of falls or injuries this shift. Discussed meds and care. Pt has no questions at this time. Will continue to monitor.

## 2017-03-19 NOTE — NURSING TRANSFER
Nursing Transfer Note      3/18/2017     Transfer From: ER    Transfer via stretcher    Transfer with 2.5L to O2, cardiac monitoring    Transported by PCT    Medicines sent: VANCOMYCIN    Chart send with patient: Yes    Notified: daughter    Patient reassessed at: 3/18/17 1730     Upon arrival to floor: cardiac monitor applied, patient oriented to room, call bell in reach and bed in lowest position.VSS

## 2017-03-19 NOTE — CONSULTS
Nelly Tian [id number] is a 66 y.o. female who has been consulted for vancomycin dosing.    The patient has the following labs:     Date Creatinine (mg/dl)    BUN WBC Count   3/19/2017 Estimated Creatinine Clearance: 140.3 mL/min (based on Cr of 0.6). Lab Results   Component Value Date    BUN 10 03/19/2017     Lab Results   Component Value Date    WBC 9.10 03/19/2017        Current weight is (!) 142 kg (313 lb)    Pt is receiving vancomycin 1250 mg every 8 hours.  Vancomycin trough from 03/19 at 1625 was 16.9 mg/dL.  Target trough range is 10-15 mg/dL.   Trough was drawn on time and anticipate it is supra/therapeutic. Pharmacy will decrease current regimen to  a vancomycin dose of 1000 mg every 8 hours. A vancomycin trough has been ordered prior to 4th dose due 03/20 at 1830.      Patient will be followed by pharmacy for changes in renal function, toxicity, and efficacy.  Thank you for allowing us to participate in this patient's care.     Ambrocio Guillen, MichaelD

## 2017-03-19 NOTE — PLAN OF CARE
Problem: Patient Care Overview  Goal: Plan of Care Review  Outcome: Ongoing (interventions implemented as appropriate)  Pt C/O intermittent pain to LLQ relieved with Norco. Chucks pads under LLQ abd to catch serosanguineous drainage. Wound care nurse to her pt Monday. Pt remains free from falls/injuries this shift.

## 2017-03-19 NOTE — ASSESSMENT & PLAN NOTE
Initiated on Vancomycin. Consult wound care for recommendations. Continue debridement oint per [facility] wound care.

## 2017-03-19 NOTE — SUBJECTIVE & OBJECTIVE
Interval History: No new issues overnight. Left abdomen pain controlled with pain medications. Ulcerations unchanged. Patient anxious due to current condition.     Review of Systems   Constitutional: Negative for diaphoresis, fatigue and fever.   Respiratory: Negative for cough and shortness of breath.    Cardiovascular: Positive for leg swelling. Negative for chest pain.   Gastrointestinal: Positive for abdominal pain. Negative for abdominal distention.   Musculoskeletal: Positive for back pain.   Skin: Negative for rash.        Ulceration to left abdomen    Neurological: Negative for speech difficulty.   Psychiatric/Behavioral:        Depressed.      Objective:     Vital Signs (Most Recent):  Temp: 98.7 °F (37.1 °C) (03/19/17 1554)  Pulse: 80 (03/19/17 1554)  Resp: 18 (03/19/17 1554)  BP: 120/61 (03/19/17 1554)  SpO2: 95 % (03/19/17 1554) Vital Signs (24h Range):  Temp:  [97.6 °F (36.4 °C)-99 °F (37.2 °C)] 98.7 °F (37.1 °C)  Pulse:  [61-82] 80  Resp:  [18-20] 18  SpO2:  [95 %-99 %] 95 %  BP: (103-134)/(47-86) 120/61     Weight: (!) 142 kg (313 lb)  Body mass index is 47.59 kg/(m^2).  No intake or output data in the 24 hours ending 03/19/17 1647   Physical Exam   Constitutional: She is oriented to person, place, and time. She appears well-developed and well-nourished.   HENT:   Head: Normocephalic and atraumatic.   Left Ear: External ear normal.   Mouth/Throat: Oropharynx is clear and moist.   Eyes: Conjunctivae and EOM are normal. Pupils are equal, round, and reactive to light.   Neck: Normal range of motion. Neck supple.   Cardiovascular: Normal rate, normal heart sounds and intact distal pulses.    Irregular irregular Trace pretib edema with chronic skin changes. + multiple varicosities   Pulmonary/Chest: Effort normal and breath sounds normal.   Abdominal: Soft. Bowel sounds are normal. There is tenderness.   Grossly protuberant. Large hernia to left abdomen with tenderness    Musculoskeletal: Normal range of  motion.   Neurological: She is alert and oriented to person, place, and time.   Skin: Skin is warm and dry.   Left abdomen with multiple ulcerations. Mild erythema surrounding ulcers with no warmth. Ulcerations with minimal drainage and areas of yellow and necrotic skin. See pics  Yeast rash to skin folds.   Psychiatric: She has a normal mood and affect. Her behavior is normal. Judgment normal.   Anxious    Nursing note and vitals reviewed.      Significant Labs:   BMP:   Recent Labs  Lab 03/19/17  0540   *      K 3.8      CO2 30*   BUN 10   CREATININE 0.6   CALCIUM 9.3     CBC:   Recent Labs  Lab 03/18/17  1410 03/19/17  0540   WBC 9.30 9.10   HGB 12.0 10.8*   HCT 38.7 34.3*    243       Significant Imaging: I have reviewed and interpreted all pertinent imaging results/findings within the past 24 hours.

## 2017-03-19 NOTE — H&P
"[facility] Medicine  History & Physical    Patient Name: Nelly Tian  MRN: [id number]  Admission Date: 3/18/2017  Attending Physician: Kwesi Wolf Jr., MD   Primary Care Provider: Constantine Bird MD         Patient information was obtained from patient and ER records.     Subjective:     Principal Problem:Nonhealing skin ulcer, limited to breakdown of skin    Chief Complaint:   Chief Complaint   Patient presents with    abdominal wound        HPI: Nelly Tian is 66 year old female with PMH of morbid obesity, DM type 2, DM, HTN, atrial fibrillation, and morbid obesity who presents to the ED for evaluation of gradual worsening of multiple ulcerations to the Left abdomen for ~ 3 weeks. She states increased redness and pain to the area. Per pt's daughter ulcer progressively worsened and the area is now "extremely tender, erythematous, and warm to the touch".  She reports mild relief of pain with home narcotics. She currently is under the care of Wound care at [facility]. She was evaluated in the ED for similar complaint last week and initiated on  Oral Clindamycin. She reports no improvement with the antibiotic. Denies fever, chills, and SOB.     Past Medical History:   Diagnosis Date    *Atrial flutter     Anxiety     Arthritis     Asthma     Atrial fibrillation     Back pain     Cataract     OD    CHF (congestive heart failure)     COPD (chronic obstructive pulmonary disease)     Depression     Diabetes mellitus     Emphysema of lung     Heart failure     Hernia     Hypercapnic respiratory failure, chronic 11/16/2016    Hyperlipidemia     Hypertension     Iron deficiency anemia 2/3/2016    Myocardial infarction     Obesity     Peripheral vascular disease     Pneumonia     Polyneuropathy     Retinal detachment     OS    Skin ulcer 3/18/2017    Tobacco dependence     Type II 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, not stated as " uncontrolled        Past Surgical History:   Procedure Laterality Date    CATARACT EXTRACTION Left     OS      SECTION      x2    EYE SURGERY      HYSTERECTOMY      OOPHORECTOMY      one ovary conserved    RETINAL DETACHMENT SURGERY      buckle --OS    TONSILLECTOMY         Review of patient's allergies indicates:   Allergen Reactions    Dilaudid [hydromorphone] Anaphylaxis     Other reaction(s): Anaphylaxis  Other reaction(s): Unknown       No current facility-administered medications on file prior to encounter.      Current Outpatient Prescriptions on File Prior to Encounter   Medication Sig    acetaminophen (TYLENOL) 325 MG tablet Take 2 tablets (650 mg total) by mouth every 6 (six) hours as needed.    albuterol (ACCUNEB) 0.63 mg/3 mL Nebu INHALE THE CONTENTS OF 1 VIAL  BY  NEBULIZER EVERY 6 HOURS AS NEEDED    albuterol (VENTOLIN HFA) 90 mcg/actuation inhaler INHALE TWO PUFFS BY MOUTH EVERY 6 HOURS AS NEEDED FOR WHEEZING    albuterol-ipratropium 2.5mg-0.5mg/3mL (DUO-NEB) 0.5 mg-3 mg(2.5 mg base)/3 mL nebulizer solution INHALE THE CONTENTS OF 1 VIAL VIA NEBULIZER EVERY 6 HOURS AS NEEDED FOR  WHEEZING (DO NOT USE WITH ALBUTEROL INHALER)    ascorbic acid, vitamin C, (VITAMIN C) 500 MG tablet Take 1 tablet (500 mg total) by mouth every evening.    atorvastatin (LIPITOR) 20 MG tablet Take 1 tablet (20 mg total) by mouth once daily.    blood sugar diagnostic (TRUE METRIX GLUCOSE TEST STRIP) Strp Use to test blood sugar three times a day   DX:E11.9    blood-glucose meter (TRUE METRIX AIR GLUCOSE METER) kit Use as instructed to test blood sugar   DX:E11.9    budesonide (PULMICORT) 0.5 mg/2 mL nebulizer solution INHALE THE CONTENTS OF 1 VIAL VIA NEBULIZER EVERY DAY    clonazePAM (KLONOPIN) 1 MG tablet Take 1 tablet (1 mg total) by mouth 2 (two) times daily as needed for Anxiety.    collagenase ointment Apply topically once daily.    escitalopram oxalate (LEXAPRO) 10 MG tablet TAKE 1 TABLET ONE  TIME DAILY    furosemide (LASIX) 40 MG tablet Take 1 tablet (40 mg total) by mouth 2 (two) times daily.    gabapentin (NEURONTIN) 600 MG tablet Take 1 tablet (600 mg total) by mouth 3 (three) times daily.    hydrocodone-acetaminophen 10-325mg (NORCO)  mg Tab Take 1 tablet by mouth every 6 (six) hours as needed. (Patient taking differently: Take 1 tablet by mouth every 6 (six) hours as needed for Pain. )    lancets (TRUEPLUS LANCETS) 33 gauge Misc 1 lancet by Misc.(Non-Drug; Combo Route) route 3 (three) times daily. To test blood sugar   DX: E11.9    levalbuterol (XOPENEX) 0.63 mg/3 mL nebulizer solution INHALE THE CONTENTS OF 1 VIAL BY NEBULIZATION 3 (THREE) TIMES DAILY.    lisinopril (PRINIVIL,ZESTRIL) 20 MG tablet TAKE 1 TABLET ONE TIME DAILY    metformin (GLUCOPHAGE) 500 MG tablet TAKE 1 TABLET TWICE DAILY WITH MEALS    methocarbamol (ROBAXIN) 750 MG Tab TAKE 1 TABLET FOUR TIMES DAILY    metoprolol succinate (TOPROL-XL) 25 MG 24 hr tablet Take 0.5 tablets (12.5 mg total) by mouth once daily. (Patient taking differently: Take 12.5 mg by mouth once daily. On hold waiting for dr visit)    miconazole NITRATE 2 % (MICOTIN) 2 % top powder Apply topically 2 (two) times daily. Skin folds    multivitamin (THERAGRAN) tablet Take 1 tablet by mouth once daily.    polyethylene glycol (GLYCOLAX) 17 gram/dose powder MIX 17 GRAMS IN LIQUID AND DRINK EVERY DAY AS NEEDED    potassium chloride SA (K-DUR,KLOR-CON) 20 MEQ tablet Take 1 tablet (20 mEq total) by mouth once daily.    SPIRIVA WITH HANDIHALER 18 mcg inhalation capsule INHALE THE CONTENTS OF 1 CAPSULE EVERY DAY    temazepam (RESTORIL) 15 mg Cap TAKE 1 CAPSULE ONE TIME DAILY (Patient taking differently: TAKE 1 CAPSULE HS PRN insomnia)    trazodone (DESYREL) 150 MG tablet Take 1 tablet (150 mg total) by mouth every evening. (Patient taking differently: Take 150 mg by mouth nightly as needed. )    warfarin (COUMADIN) 5 MG tablet TAKE ONE TABLET EVERY  DAY OR AS DIRECTED BY COUMADIN CLINIC (Patient taking differently: 5 mg DAILY)     Family History     Problem Relation (Age of Onset)    Alcohol abuse Mother    Arthritis Father, Sister    Blindness Son    Cancer Brother    Crohn's disease Sister    Early death Sister (30)    Heart disease Father, Sister (32), Sister    No Known Problems Brother, Daughter        Social History Main Topics    Smoking status: Current Some Day Smoker     Packs/day: 0.25     Years: 50.00     Types: Cigarettes     Start date: 1/19/1968     Last attempt to quit: 9/19/2015    Smokeless tobacco: Former User     Quit date: 2/12/2017      Comment: 1 ppd for 20 years    Alcohol use No    Drug use: No    Sexual activity: Not Currently     Review of Systems   Constitutional: Positive for fatigue. Negative for diaphoresis and fever.   HENT: Negative for congestion and facial swelling.    Eyes: Negative for pain and redness.   Respiratory: Negative for cough and shortness of breath.    Cardiovascular: Positive for palpitations and leg swelling. Negative for chest pain.   Gastrointestinal: Negative for abdominal distention. Abdominal pain: left abdomen due to ulceration    Endocrine: Negative for polydipsia.   Genitourinary: Negative for dysuria and flank pain.   Musculoskeletal: Positive for back pain.   Skin: Negative for rash.        Ulcers to left abdomen   Neurological: Negative for speech difficulty and numbness.   Psychiatric/Behavioral: Negative for agitation and confusion.     Objective:     Vital Signs (Most Recent):  Temp: 98.4 °F (36.9 °C) (03/19/17 0500)  Pulse: 62 (03/19/17 0500)  Resp: 18 (03/19/17 0500)  BP: (!) 104/47 (03/19/17 0500)  SpO2: 95 % (03/19/17 0500) Vital Signs (24h Range):  Temp:  [97.6 °F (36.4 °C)-98.8 °F (37.1 °C)] 98.4 °F (36.9 °C)  Pulse:  [61-98] 62  Resp:  [18-20] 18  SpO2:  [95 %-99 %] 95 %  BP: (103-134)/(47-86) 104/47     Weight: (!) 142 kg (313 lb)  Body mass index is 47.59 kg/(m^2).    Physical Exam    Constitutional: She is oriented to person, place, and time. She appears well-developed and well-nourished.   HENT:   Head: Normocephalic and atraumatic.   Right Ear: External ear normal.   Left Ear: External ear normal.   Mouth/Throat: Oropharynx is clear and moist.   Eyes: Conjunctivae and EOM are normal. Pupils are equal, round, and reactive to light.   Neck: Normal range of motion. Neck supple.   Cardiovascular: Normal rate, normal heart sounds and intact distal pulses.    Irregular irregular Trace pretib edema with chronic skin changes. + multiple varicosities    Pulmonary/Chest: Effort normal and breath sounds normal. She has no wheezes.   Abdominal: Soft. Bowel sounds are normal. There is tenderness.   Left abdomen with large hernia.  Grossly protuberant    Musculoskeletal: Normal range of motion.   Neurological: She is alert and oriented to person, place, and time.   Skin: Skin is warm and dry.   Left abdomen with multiple ulcerations. Mild erythema surrounding ulcers with no warmth. Ulcerations with minimal drainage and areas of yellow and necrotic skin. See pics  Yeast rash to skin folds.   Psychiatric: She has a normal mood and affect. Her behavior is normal. Judgment normal.   Nursing note and vitals reviewed.       Significant Labs:   Cbc. WBC 9.3, HH 12/38.7  INR 2.9   CMP normal   CRP 2.8    Significant Imaging: I have reviewed and interpreted all pertinent imaging results/findings within the past 24 hours.    Assessment/Plan:     * Nonhealing skin ulcer, limited to breakdown of skin  Initiated on Vancomycin. Consult wound care for recommendations. Continue debridement oint per [facility] wound care.       Diabetes mellitus with neuropathy  Chronic. Fall precautions.       Major depression, recurrent, chronic  Resume home antidepressants.       Tobacco dependency    Educated on smoking cessation.     Chronic atrial fibrillation  CVR resume home warfain      Hypertension associated with diabetes  Chronic.  Resume home ACEI and Lasix. accuchecks ac and hs. Insulin correction scale      Morbid obesity with BMI of 45.0-49.9, adult  Low fat diabetic deit. Encourage exercise and diet modifications for weight loss      PVD (peripheral vascular disease)  As evidence by examination. Resume statin      Chronic combined systolic and diastolic congestive heart failure  Resume home lasix bid and ACEI.       Chronic respiratory failure with hypoxia  Resume  Home oxygen at 2 L nasal cannula. Monitor oxygen sat      Pressure ulcer, stage 3  To left abdomen. See above. Wound care and dietary consulted      VTE Risk Mitigation         Ordered     warfarin (COUMADIN) tablet 5 mg  Daily     Route:  Oral        03/18/17 1921     Medium Risk of VTE  Once      03/18/17 1732        Ca Dupont NP  Department of Hospital Medicine   [facility]

## 2017-03-19 NOTE — PLAN OF CARE
03/19/17 1554   PRE-TX-[id number]   O2 Device (Oxygen Therapy) nasal cannula   Flow (L/min) 2.5   Oxygen Concentration (%) 30   SpO2 95 %  (Simultaneous filing. User may not have seen previous data.)   Pulse Oximetry Type Intermittent   $ Pulse Oximetry - Multiple Charge Pulse Oximetry - Multiple

## 2017-03-19 NOTE — SUBJECTIVE & OBJECTIVE
Past Medical History:   Diagnosis Date    *Atrial flutter     Anxiety     Arthritis     Asthma     Atrial fibrillation     Back pain     Cataract     OD    CHF (congestive heart failure)     COPD (chronic obstructive pulmonary disease)     Depression     Diabetes mellitus     Emphysema of lung     Heart failure     Hernia     Hypercapnic respiratory failure, chronic 2016    Hyperlipidemia     Hypertension     Iron deficiency anemia 2/3/2016    Myocardial infarction     Obesity     Peripheral vascular disease     Pneumonia     Polyneuropathy     Retinal detachment     OS    Skin ulcer 3/18/2017    Tobacco dependence     Type II 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, not stated as uncontrolled        Past Surgical History:   Procedure Laterality Date    CATARACT EXTRACTION Left     OS      SECTION      x2    EYE SURGERY      HYSTERECTOMY      OOPHORECTOMY      one ovary conserved    RETINAL DETACHMENT SURGERY      buckle --OS    TONSILLECTOMY         Review of patient's allergies indicates:   Allergen Reactions    Dilaudid [hydromorphone] Anaphylaxis     Other reaction(s): Anaphylaxis  Other reaction(s): Unknown       No current facility-administered medications on file prior to encounter.      Current Outpatient Prescriptions on File Prior to Encounter   Medication Sig    acetaminophen (TYLENOL) 325 MG tablet Take 2 tablets (650 mg total) by mouth every 6 (six) hours as needed.    albuterol (ACCUNEB) 0.63 mg/3 mL Nebu INHALE THE CONTENTS OF 1 VIAL  BY  NEBULIZER EVERY 6 HOURS AS NEEDED    albuterol (VENTOLIN HFA) 90 mcg/actuation inhaler INHALE TWO PUFFS BY MOUTH EVERY 6 HOURS AS NEEDED FOR WHEEZING    albuterol-ipratropium 2.5mg-0.5mg/3mL (DUO-NEB) 0.5 mg-3 mg(2.5 mg base)/3 mL nebulizer solution INHALE THE CONTENTS OF 1 VIAL VIA NEBULIZER EVERY 6 HOURS AS NEEDED FOR  WHEEZING (DO NOT USE WITH ALBUTEROL INHALER)    ascorbic acid,  vitamin C, (VITAMIN C) 500 MG tablet Take 1 tablet (500 mg total) by mouth every evening.    atorvastatin (LIPITOR) 20 MG tablet Take 1 tablet (20 mg total) by mouth once daily.    blood sugar diagnostic (TRUE METRIX GLUCOSE TEST STRIP) Strp Use to test blood sugar three times a day   DX:E11.9    blood-glucose meter (TRUE METRIX AIR GLUCOSE METER) kit Use as instructed to test blood sugar   DX:E11.9    budesonide (PULMICORT) 0.5 mg/2 mL nebulizer solution INHALE THE CONTENTS OF 1 VIAL VIA NEBULIZER EVERY DAY    clonazePAM (KLONOPIN) 1 MG tablet Take 1 tablet (1 mg total) by mouth 2 (two) times daily as needed for Anxiety.    collagenase ointment Apply topically once daily.    escitalopram oxalate (LEXAPRO) 10 MG tablet TAKE 1 TABLET ONE TIME DAILY    furosemide (LASIX) 40 MG tablet Take 1 tablet (40 mg total) by mouth 2 (two) times daily.    gabapentin (NEURONTIN) 600 MG tablet Take 1 tablet (600 mg total) by mouth 3 (three) times daily.    hydrocodone-acetaminophen 10-325mg (NORCO)  mg Tab Take 1 tablet by mouth every 6 (six) hours as needed. (Patient taking differently: Take 1 tablet by mouth every 6 (six) hours as needed for Pain. )    lancets (TRUEPLUS LANCETS) 33 gauge Misc 1 lancet by Misc.(Non-Drug; Combo Route) route 3 (three) times daily. To test blood sugar   DX: E11.9    levalbuterol (XOPENEX) 0.63 mg/3 mL nebulizer solution INHALE THE CONTENTS OF 1 VIAL BY NEBULIZATION 3 (THREE) TIMES DAILY.    lisinopril (PRINIVIL,ZESTRIL) 20 MG tablet TAKE 1 TABLET ONE TIME DAILY    metformin (GLUCOPHAGE) 500 MG tablet TAKE 1 TABLET TWICE DAILY WITH MEALS    methocarbamol (ROBAXIN) 750 MG Tab TAKE 1 TABLET FOUR TIMES DAILY    metoprolol succinate (TOPROL-XL) 25 MG 24 hr tablet Take 0.5 tablets (12.5 mg total) by mouth once daily. (Patient taking differently: Take 12.5 mg by mouth once daily. On hold waiting for dr visit)    miconazole NITRATE 2 % (MICOTIN) 2 % top powder Apply topically 2 (two)  times daily. Skin folds    multivitamin (THERAGRAN) tablet Take 1 tablet by mouth once daily.    polyethylene glycol (GLYCOLAX) 17 gram/dose powder MIX 17 GRAMS IN LIQUID AND DRINK EVERY DAY AS NEEDED    potassium chloride SA (K-DUR,KLOR-CON) 20 MEQ tablet Take 1 tablet (20 mEq total) by mouth once daily.    SPIRIVA WITH HANDIHALER 18 mcg inhalation capsule INHALE THE CONTENTS OF 1 CAPSULE EVERY DAY    temazepam (RESTORIL) 15 mg Cap TAKE 1 CAPSULE ONE TIME DAILY (Patient taking differently: TAKE 1 CAPSULE HS PRN insomnia)    trazodone (DESYREL) 150 MG tablet Take 1 tablet (150 mg total) by mouth every evening. (Patient taking differently: Take 150 mg by mouth nightly as needed. )    warfarin (COUMADIN) 5 MG tablet TAKE ONE TABLET EVERY DAY OR AS DIRECTED BY COUMADIN CLINIC (Patient taking differently: 5 mg DAILY)     Family History     Problem Relation (Age of Onset)    Alcohol abuse Mother    Arthritis Father, Sister    Blindness Son    Cancer Brother    Crohn's disease Sister    Early death Sister (30)    Heart disease Father, Sister (32), Sister    No Known Problems Brother, Daughter        Social History Main Topics    Smoking status: Current Some Day Smoker     Packs/day: 0.25     Years: 50.00     Types: Cigarettes     Start date: 1/19/1968     Last attempt to quit: 9/19/2015    Smokeless tobacco: Former User     Quit date: 2/12/2017      Comment: 1 ppd for 20 years    Alcohol use No    Drug use: No    Sexual activity: Not Currently     Review of Systems   Constitutional: Positive for fatigue. Negative for diaphoresis and fever.   HENT: Negative for congestion and facial swelling.    Eyes: Negative for pain and redness.   Respiratory: Negative for cough and shortness of breath.    Cardiovascular: Positive for palpitations and leg swelling. Negative for chest pain.   Gastrointestinal: Negative for abdominal distention. Abdominal pain: left abdomen due to ulceration    Endocrine: Negative for  polydipsia.   Genitourinary: Negative for dysuria and flank pain.   Musculoskeletal: Positive for back pain.   Skin: Negative for rash.        Ulcers to left abdomen   Neurological: Negative for speech difficulty and numbness.   Psychiatric/Behavioral: Negative for agitation and confusion.     Objective:     Vital Signs (Most Recent):  Temp: 98.4 °F (36.9 °C) (03/19/17 0500)  Pulse: 62 (03/19/17 0500)  Resp: 18 (03/19/17 0500)  BP: (!) 104/47 (03/19/17 0500)  SpO2: 95 % (03/19/17 0500) Vital Signs (24h Range):  Temp:  [97.6 °F (36.4 °C)-98.8 °F (37.1 °C)] 98.4 °F (36.9 °C)  Pulse:  [61-98] 62  Resp:  [18-20] 18  SpO2:  [95 %-99 %] 95 %  BP: (103-134)/(47-86) 104/47     Weight: (!) 142 kg (313 lb)  Body mass index is 47.59 kg/(m^2).    Physical Exam   Constitutional: She is oriented to person, place, and time. She appears well-developed and well-nourished.   HENT:   Head: Normocephalic and atraumatic.   Right Ear: External ear normal.   Left Ear: External ear normal.   Mouth/Throat: Oropharynx is clear and moist.   Eyes: Conjunctivae and EOM are normal. Pupils are equal, round, and reactive to light.   Neck: Normal range of motion. Neck supple.   Cardiovascular: Normal rate, normal heart sounds and intact distal pulses.    Irregular irregular Trace pretib edema with chronic skin changes. + multiple varicosities    Pulmonary/Chest: Effort normal and breath sounds normal. She has no wheezes.   Abdominal: Soft. Bowel sounds are normal. There is tenderness.   Left abdomen with large hernia.  Grossly protuberant    Musculoskeletal: Normal range of motion.   Neurological: She is alert and oriented to person, place, and time.   Skin: Skin is warm and dry.   Left abdomen with multiple ulcerations. Mild erythema surrounding ulcers with no warmth. Ulcerations with minimal drainage and areas of yellow and necrotic skin. See pics  Yeast rash to skin folds.   Psychiatric: She has a normal mood and affect. Her behavior is normal.  Judgment normal.   Nursing note and vitals reviewed.       Significant Labs:   Cbc. WBC 9.3, HH 12/38.7  INR 2.9   CMP normal   CRP 2.8    Significant Imaging: I have reviewed and interpreted all pertinent imaging results/findings within the past 24 hours.

## 2017-03-19 NOTE — PROGRESS NOTES
SSC met with patient at bedside to complete discharge planning assessment.  Patient verified all demographic information on facesheet is correct.  Patient verified PCP is Dr. Constantine Bird. Patient verified primary health insurance is [employer] and secondary is [place] Medicaid.   Patient states she is active with [facility].  Patient signed patient choice disclosure choosing to resume home health upon discharge with [facility].  PATIENT HAS HOME O2 WITH PORTABLE TANKS.  PATIENT HAS NEBULIZER.  Patient state she has a bedside commode BUT it is broke.  Patient would like replaced.            Discharge Planning Assessment    Patient Identification  Name: Nelly Tian  Age: 66 y.o.   Gender: female.  Admitting Diagnosis: Nonhealing skin ulcer, limited to breakdown of skin  PCP: Constantine Bird MD   PCP Address: [address] [place] [address] / [facility] [address]  PCP Phone Number: [phone]     Telephone Contacts  Extended Emergency Contact Information  Primary Emergency Contact: Maye Tirado  Address: [address] [place] [address] [id number] [place] [address]  Mobile Phone: [phone]  Relation: Daughter  Preferred language: English   needed? No    Alert/Orientation: yes, xs4   If patient is unable to sign for self, who is handling affairs?: daughter Maye Tirado  Is this person the HPOA? Yes, daughter Maye  Does patient have a living will? yes    Arrived from: home  Lives with: daughter  Support System: daughter  Who do you want involved in your discharge planning? daughter  Are they available to help you at discharge? yes  Prior Level of Functioning: independent  Were you able to care for yourself at home? yes  Who assists with Medications, Meal Prep, Housekeeping, Laundry, Shopping, MD Appts?: self, daughter    Have you been in the hospital in the last 30 days?: no  If so, were you admitted for the same reason? n/a  If not, why were you in the hospital?  n/a    Services received prior to admit:  Home health  DME used at home: home O2 with portable tank, nebulizer, commode (broke), pulse ox machine, bp machine, walker    Capacity for self-care: independent  Services patient will need at discharge: resumption home health  DME pt will need at discharge: commode    Pharmacy most often used: [facility] [place]  Able to afford meds?: yes    How do you get to your MD appointments / Do you have transportation or depend on someone else:  daughter  Are you taking Coumadin at home?:   yes If so, who monitors your INR: home health draw labs - PCP  Are you on Dialysis?:  no If so where do you attend dialysis: n/a    Community Resources & Referrals Needed: resumption home health      Discharge plan 1:  Home with home health  Discharge plan 2:

## 2017-03-20 ENCOUNTER — DOCUMENTATION ONLY (OUTPATIENT)
Dept: FAMILY MEDICINE | Facility: CLINIC | Age: 67
End: 2017-03-20

## 2017-03-20 LAB
ALBUMIN SERPL BCP-MCNC: 2.8 G/DL
ALP SERPL-CCNC: 67 U/L
ALT SERPL W/O P-5'-P-CCNC: 11 U/L
ANION GAP SERPL CALC-SCNC: 10 MMOL/L
AST SERPL-CCNC: 14 U/L
BASOPHILS # BLD AUTO: 0.1 K/UL
BASOPHILS NFR BLD: 0.7 %
BILIRUB SERPL-MCNC: 0.3 MG/DL
BUN SERPL-MCNC: 10 MG/DL
CALCIUM SERPL-MCNC: 9.5 MG/DL
CHLORIDE SERPL-SCNC: 100 MMOL/L
CO2 SERPL-SCNC: 30 MMOL/L
CREAT SERPL-MCNC: 0.6 MG/DL
DIFFERENTIAL METHOD: ABNORMAL
EOSINOPHIL # BLD AUTO: 0.5 K/UL
EOSINOPHIL NFR BLD: 4.7 %
ERYTHROCYTE [DISTWIDTH] IN BLOOD BY AUTOMATED COUNT: 15.9 %
EST. GFR  (AFRICAN AMERICAN): >60 ML/MIN/1.73 M^2
EST. GFR  (NON AFRICAN AMERICAN): >60 ML/MIN/1.73 M^2
GLUCOSE SERPL-MCNC: 111 MG/DL
HCT VFR BLD AUTO: 34.1 %
HGB BLD-MCNC: 11 G/DL
INR PPP: 2.5
LYMPHOCYTES # BLD AUTO: 1.7 K/UL
LYMPHOCYTES NFR BLD: 18 %
MCH RBC QN AUTO: 25.9 PG
MCHC RBC AUTO-ENTMCNC: 32.4 %
MCV RBC AUTO: 80 FL
MONOCYTES # BLD AUTO: 1.3 K/UL
MONOCYTES NFR BLD: 13.3 %
NEUTROPHILS # BLD AUTO: 6 K/UL
NEUTROPHILS NFR BLD: 63.3 %
PLATELET # BLD AUTO: 263 K/UL
PMV BLD AUTO: 8.7 FL
POCT GLUCOSE: 143 MG/DL (ref 70–110)
POTASSIUM SERPL-SCNC: 3.6 MMOL/L
PROT SERPL-MCNC: 7 G/DL
PROTHROMBIN TIME: 25.3 SEC
RBC # BLD AUTO: 4.26 M/UL
SODIUM SERPL-SCNC: 140 MMOL/L
WBC # BLD AUTO: 9.6 K/UL

## 2017-03-20 PROCEDURE — 85025 COMPLETE CBC W/AUTO DIFF WBC: CPT

## 2017-03-20 PROCEDURE — 25000003 PHARM REV CODE 250: Performed by: SPECIALIST

## 2017-03-20 PROCEDURE — 25000003 PHARM REV CODE 250: Performed by: NURSE PRACTITIONER

## 2017-03-20 PROCEDURE — 94761 N-INVAS EAR/PLS OXIMETRY MLT: CPT

## 2017-03-20 PROCEDURE — 80053 COMPREHEN METABOLIC PANEL: CPT

## 2017-03-20 PROCEDURE — 99900035 HC TECH TIME PER 15 MIN (STAT)

## 2017-03-20 PROCEDURE — 63600175 PHARM REV CODE 636 W HCPCS: Performed by: SPECIALIST

## 2017-03-20 PROCEDURE — 11000001 HC ACUTE MED/SURG PRIVATE ROOM

## 2017-03-20 PROCEDURE — 36415 COLL VENOUS BLD VENIPUNCTURE: CPT

## 2017-03-20 PROCEDURE — 85610 PROTHROMBIN TIME: CPT

## 2017-03-20 PROCEDURE — 27000221 HC OXYGEN, UP TO 24 HOURS

## 2017-03-20 RX ORDER — LIDOCAINE AND PRILOCAINE 25; 25 MG/G; MG/G
CREAM TOPICAL
Status: DISCONTINUED | OUTPATIENT
Start: 2017-03-20 | End: 2017-03-21 | Stop reason: HOSPADM

## 2017-03-20 RX ORDER — AMOXICILLIN AND CLAVULANATE POTASSIUM 875; 125 MG/1; MG/1
1 TABLET, FILM COATED ORAL EVERY 12 HOURS
Status: DISCONTINUED | OUTPATIENT
Start: 2017-03-20 | End: 2017-03-21 | Stop reason: HOSPADM

## 2017-03-20 RX ADMIN — HYDROCODONE BITARTRATE AND ACETAMINOPHEN 1 TABLET: 10; 325 TABLET ORAL at 11:03

## 2017-03-20 RX ADMIN — FUROSEMIDE 40 MG: 20 TABLET ORAL at 09:03

## 2017-03-20 RX ADMIN — WARFARIN SODIUM 5 MG: 2.5 TABLET ORAL at 05:03

## 2017-03-20 RX ADMIN — AMOXICILLIN AND CLAVULANATE POTASSIUM 1 TABLET: 875; 125 TABLET, FILM COATED ORAL at 09:03

## 2017-03-20 RX ADMIN — POTASSIUM CHLORIDE 20 MEQ: 1500 TABLET, EXTENDED RELEASE ORAL at 09:03

## 2017-03-20 RX ADMIN — LISINOPRIL 20 MG: 10 TABLET ORAL at 09:03

## 2017-03-20 RX ADMIN — CLONAZEPAM 1 MG: 1 TABLET ORAL at 03:03

## 2017-03-20 RX ADMIN — METOPROLOL SUCCINATE 12.5 MG: 25 TABLET, EXTENDED RELEASE ORAL at 09:03

## 2017-03-20 RX ADMIN — VANCOMYCIN HYDROCHLORIDE 1000 MG: 1 INJECTION, POWDER, LYOPHILIZED, FOR SOLUTION INTRAVENOUS at 10:03

## 2017-03-20 RX ADMIN — ATORVASTATIN CALCIUM 20 MG: 20 TABLET, FILM COATED ORAL at 09:03

## 2017-03-20 RX ADMIN — VANCOMYCIN HYDROCHLORIDE 1000 MG: 1 INJECTION, POWDER, LYOPHILIZED, FOR SOLUTION INTRAVENOUS at 03:03

## 2017-03-20 RX ADMIN — CLONAZEPAM 1 MG: 1 TABLET ORAL at 09:03

## 2017-03-20 RX ADMIN — THERA TABS 1 TABLET: TAB at 09:03

## 2017-03-20 RX ADMIN — OXYCODONE HYDROCHLORIDE AND ACETAMINOPHEN 500 MG: 500 TABLET ORAL at 09:03

## 2017-03-20 RX ADMIN — HYDROCODONE BITARTRATE AND ACETAMINOPHEN 1 TABLET: 10; 325 TABLET ORAL at 10:03

## 2017-03-20 RX ADMIN — HYDROCODONE BITARTRATE AND ACETAMINOPHEN 1 TABLET: 10; 325 TABLET ORAL at 05:03

## 2017-03-20 RX ADMIN — CLONAZEPAM 1 MG: 1 TABLET ORAL at 05:03

## 2017-03-20 NOTE — PROGRESS NOTES
"[facility] Medicine  Progress Note    Patient Name: Nelly Tian  MRN: [id number]  Patient Class: IP- Inpatient   Admission Date: 3/18/2017  Length of Stay: 2 days  Attending Physician: Diaz Pa MD  Primary Care Provider: Constantine Bird MD        Subjective:     Principal Problem:Nonhealing skin ulcer, limited to breakdown of skin    HPI:  Nelly Tian is 66 year old female with PMH of morbid obesity, DM type 2, DM, HTN, atrial fibrillation, and morbid obesity who presents to the ED for evaluation of gradual worsening of multiple ulcerations to the Left abdomen for ~ 3 weeks. She states increased redness and pain to the area. Per pt's daughter ulcer progressively worsened and the area is now "extremely tender, erythematous, and warm to the touch".  She reports mild relief of pain with home narcotics. She currently is under the care of Wound care at [facility]. She was evaluated in the ED for similar complaint last week and initiated on  Oral Clindamycin. She reports no improvement with the antibiotic. Denies fever, chills, and SOB.     Hospital Course:       Interval History: No new issues overnight. Left abdomen pain controlled with pain medications. Patient ambulating in room.    Discussed with Wound Care RN. No signs of infection.   Wound care noted reviewed.     Review of Systems   Constitutional: Negative for diaphoresis, fatigue and fever.   Respiratory: Negative for cough and shortness of breath.    Cardiovascular: Positive for leg swelling. Negative for chest pain.   Gastrointestinal: Positive for abdominal pain. Negative for abdominal distention.   Musculoskeletal: Positive for back pain.   Skin: Negative for rash.        Ulceration to left abdomen    Neurological: Negative for speech difficulty.   Psychiatric/Behavioral:        Depressed.      Objective:     Vital Signs (Most Recent):  Temp: 98 °F (36.7 °C) (03/20/17 1153)  Pulse: 85 (03/20/17 1153)  Resp: 16 (03/20/17 " 1153)  BP: 108/65 (03/20/17 1153)  SpO2: 96 % (03/20/17 1447) Vital Signs (24h Range):  Temp:  [97.7 °F (36.5 °C)-98.4 °F (36.9 °C)] 98 °F (36.7 °C)  Pulse:  [65-85] 85  Resp:  [16-18] 16  SpO2:  [93 %-98 %] 96 %  BP: (107-143)/(54-65) 108/65     Weight: (!) 142 kg (313 lb)  Body mass index is 47.59 kg/(m^2).    Intake/Output Summary (Last 24 hours) at 03/20/17 1605  Last data filed at 03/20/17 0500   Gross per 24 hour   Intake              860 ml   Output                0 ml   Net              860 ml      Physical Exam   Constitutional: She is oriented to person, place, and time. She appears well-developed and well-nourished.   HENT:   Head: Normocephalic and atraumatic.   Left Ear: External ear normal.   Mouth/Throat: Oropharynx is clear and moist.   Eyes: Conjunctivae and EOM are normal. Pupils are equal, round, and reactive to light.   Neck: Normal range of motion. Neck supple.   Cardiovascular: Normal rate, normal heart sounds and intact distal pulses.    Irregular irregular Trace pretib edema with chronic skin changes. + multiple varicosities   Pulmonary/Chest: Effort normal and breath sounds normal.   Abdominal: Soft. Bowel sounds are normal. There is tenderness.   Grossly protuberant. Large hernia to left abdomen with tenderness    Musculoskeletal: Normal range of motion.   Neurological: She is alert and oriented to person, place, and time.   Skin: Skin is warm and dry.   Left abdomen with multiple ulcerations. Mild erythema surrounding ulcers with no warmth. Ulcerations with minimal drainage and areas of yellow and necrotic skin. See pics. Dressing to site in place.   Yeast rash to skin folds.   Psychiatric: She has a normal mood and affect. Her behavior is normal. Judgment normal.   Anxious    Nursing note and vitals reviewed.      Significant Labs:   BMP:     Recent Labs  Lab 03/20/17  0538   *      K 3.6      CO2 30*   BUN 10   CREATININE 0.6   CALCIUM 9.5     CBC:     Recent Labs  Lab  03/19/17  0540 03/20/17  0538   WBC 9.10 9.60   HGB 10.8* 11.0*   HCT 34.3* 34.1*    263       Significant Imaging: I have reviewed and interpreted all pertinent imaging results/findings within the past 24 hours.    Assessment/Plan:      * Nonhealing skin ulcer, limited to breakdown of skin  Initiated on Vancomycin. Consult wound care for recommendations. Continue debridement oint per [facility] wound care. Change Vanomycin to augmentin.       Diabetes mellitus with neuropathy  Chronic. Fall precautions.       Major depression, recurrent, chronic  Resume home antidepressants. Clonopin added TID      Tobacco dependency  Educated on smoking cessation.     Chronic atrial fibrillation  CVR resume home warfain      Hypertension associated with diabetes  Chronic. Resume home ACEI and Lasix. accuchecks ac and hs. Insulin correction scale      Morbid obesity with BMI of 45.0-49.9, adult  Low fat diabetic diet. Encourage exercise and diet modifications for weight loss      PVD (peripheral vascular disease)  As evidence by examination. Resume statin      Chronic combined systolic and diastolic congestive heart failure  Resume home lasix bid and ACEI.       Chronic respiratory failure with hypoxia  Resume  Home oxygen at 2 L nasal cannula. Monitor oxygen sat      Pressure ulcer, stage 3  To left abdomen. See above. Wound care and dietary consulted      VTE Risk Mitigation         Ordered     warfarin (COUMADIN) tablet 5 mg  Daily     Route:  Oral        03/18/17 1921     Medium Risk of VTE  Once      03/18/17 1732        Discussed with Patient and daughter POC. WOC RN will reevaluate ulcers in am and change dressing.  Disposition: home tomorrow. Discussed importance of close follow up with wound care. Patient canceled apts previous scheduled for her prior to DC.     Ca Dupont NP  Department of Hospital Medicine   [facility]

## 2017-03-20 NOTE — SUBJECTIVE & OBJECTIVE
Interval History: No new issues overnight. Left abdomen pain controlled with pain medications. Patient ambulating in room.    Discussed with Wound Care RN. No signs of infection.   Wound care noted reviewed.     Review of Systems   Constitutional: Negative for diaphoresis, fatigue and fever.   Respiratory: Negative for cough and shortness of breath.    Cardiovascular: Positive for leg swelling. Negative for chest pain.   Gastrointestinal: Positive for abdominal pain. Negative for abdominal distention.   Musculoskeletal: Positive for back pain.   Skin: Negative for rash.        Ulceration to left abdomen    Neurological: Negative for speech difficulty.   Psychiatric/Behavioral:        Depressed.      Objective:     Vital Signs (Most Recent):  Temp: 98 °F (36.7 °C) (03/20/17 1153)  Pulse: 85 (03/20/17 1153)  Resp: 16 (03/20/17 1153)  BP: 108/65 (03/20/17 1153)  SpO2: 96 % (03/20/17 1447) Vital Signs (24h Range):  Temp:  [97.7 °F (36.5 °C)-98.4 °F (36.9 °C)] 98 °F (36.7 °C)  Pulse:  [65-85] 85  Resp:  [16-18] 16  SpO2:  [93 %-98 %] 96 %  BP: (107-143)/(54-65) 108/65     Weight: (!) 142 kg (313 lb)  Body mass index is 47.59 kg/(m^2).    Intake/Output Summary (Last 24 hours) at 03/20/17 1605  Last data filed at 03/20/17 0500   Gross per 24 hour   Intake              860 ml   Output                0 ml   Net              860 ml      Physical Exam   Constitutional: She is oriented to person, place, and time. She appears well-developed and well-nourished.   HENT:   Head: Normocephalic and atraumatic.   Left Ear: External ear normal.   Mouth/Throat: Oropharynx is clear and moist.   Eyes: Conjunctivae and EOM are normal. Pupils are equal, round, and reactive to light.   Neck: Normal range of motion. Neck supple.   Cardiovascular: Normal rate, normal heart sounds and intact distal pulses.    Irregular irregular Trace pretib edema with chronic skin changes. + multiple varicosities   Pulmonary/Chest: Effort normal and breath  sounds normal.   Abdominal: Soft. Bowel sounds are normal. There is tenderness.   Grossly protuberant. Large hernia to left abdomen with tenderness    Musculoskeletal: Normal range of motion.   Neurological: She is alert and oriented to person, place, and time.   Skin: Skin is warm and dry.   Left abdomen with multiple ulcerations. Mild erythema surrounding ulcers with no warmth. Ulcerations with minimal drainage and areas of yellow and necrotic skin. See pics. Dressing to site in place.   Yeast rash to skin folds.   Psychiatric: She has a normal mood and affect. Her behavior is normal. Judgment normal.   Anxious    Nursing note and vitals reviewed.      Significant Labs:   BMP:     Recent Labs  Lab 03/20/17  0538   *      K 3.6      CO2 30*   BUN 10   CREATININE 0.6   CALCIUM 9.5     CBC:     Recent Labs  Lab 03/19/17  0540 03/20/17  0538   WBC 9.10 9.60   HGB 10.8* 11.0*   HCT 34.3* 34.1*    263       Significant Imaging: I have reviewed and interpreted all pertinent imaging results/findings within the past 24 hours.

## 2017-03-20 NOTE — PLAN OF CARE
Problem: Patient Care Overview  Goal: Plan of Care Review  Patient remains free from fall or injury and has ambulated around room frequently throughout shift.  Pain well controlled on oral pain meds.  Patient up to bedside commode and urinating without difficulty.  Patient verbalizes readiness for discharge.  Wound care performed by wound care nurse this shift.  Plan of care reviewed with patient and daughter.

## 2017-03-20 NOTE — ASSESSMENT & PLAN NOTE
Initiated on Vancomycin. Consult wound care for recommendations. Continue debridement oint per [facility] wound care. Change Vanomycin to augmentin.

## 2017-03-20 NOTE — ASSESSMENT & PLAN NOTE
Initiated on Vancomycin. Consult wound care for recommendations. Continue debridement oint per [facility] wound care.

## 2017-03-20 NOTE — PLAN OF CARE
03/20/17 1447   PRE-TX-O2-ETCO2   O2 Device (Oxygen Therapy) nasal cannula   $ Is the patient on Oxygen? Yes   Flow (L/min) 2   Oxygen Concentration (%) 28   SpO2 96 %   Pulse Oximetry Type Intermittent   $ Pulse Oximetry - Multiple Charge Pulse Oximetry - Multiple   Aerosol Therapy   $ Aerosol Therapy Charges PRN treatment not required   Ready to Wean/Extubation Screen   FIO2<60 (chart decimal) 0.28

## 2017-03-20 NOTE — PROGRESS NOTES
"[facility] Medicine  Progress Note    Patient Name: Nelly Tian  MRN: [id number]  Patient Class: IP- Inpatient   Admission Date: 3/18/2017  Length of Stay: 1 days  Attending Physician: Kwesi Wolf Jr., MD  Primary Care Provider: Constantine Bird MD        Subjective:     Principal Problem:Nonhealing skin ulcer, limited to breakdown of skin    HPI:  Nelly Tian is 66 year old female with PMH of morbid obesity, DM type 2, DM, HTN, atrial fibrillation, and morbid obesity who presents to the ED for evaluation of gradual worsening of multiple ulcerations to the Left abdomen for ~ 3 weeks. She states increased redness and pain to the area. Per pt's daughter ulcer progressively worsened and the area is now "extremely tender, erythematous, and warm to the touch".  She reports mild relief of pain with home narcotics. She currently is under the care of Wound care at [facility]. She was evaluated in the ED for similar complaint last week and initiated on  Oral Clindamycin. She reports no improvement with the antibiotic. Denies fever, chills, and SOB.     Hospital Course:       Interval History: No new issues overnight. Left abdomen pain controlled with pain medications. Ulcerations unchanged. Patient anxious due to current condition.     Review of Systems   Constitutional: Negative for diaphoresis, fatigue and fever.   Respiratory: Negative for cough and shortness of breath.    Cardiovascular: Positive for leg swelling. Negative for chest pain.   Gastrointestinal: Positive for abdominal pain. Negative for abdominal distention.   Musculoskeletal: Positive for back pain.   Skin: Negative for rash.        Ulceration to left abdomen    Neurological: Negative for speech difficulty.   Psychiatric/Behavioral:        Depressed.      Objective:     Vital Signs (Most Recent):  Temp: 98.7 °F (37.1 °C) (03/19/17 1554)  Pulse: 80 (03/19/17 1554)  Resp: 18 (03/19/17 1554)  BP: 120/61 (03/19/17 1554)  SpO2: 95 " % (03/19/17 1554) Vital Signs (24h Range):  Temp:  [97.6 °F (36.4 °C)-99 °F (37.2 °C)] 98.7 °F (37.1 °C)  Pulse:  [61-82] 80  Resp:  [18-20] 18  SpO2:  [95 %-99 %] 95 %  BP: (103-134)/(47-86) 120/61     Weight: (!) 142 kg (313 lb)  Body mass index is 47.59 kg/(m^2).  No intake or output data in the 24 hours ending 03/19/17 1647   Physical Exam   Constitutional: She is oriented to person, place, and time. She appears well-developed and well-nourished.   HENT:   Head: Normocephalic and atraumatic.   Left Ear: External ear normal.   Mouth/Throat: Oropharynx is clear and moist.   Eyes: Conjunctivae and EOM are normal. Pupils are equal, round, and reactive to light.   Neck: Normal range of motion. Neck supple.   Cardiovascular: Normal rate, normal heart sounds and intact distal pulses.    Irregular irregular Trace pretib edema with chronic skin changes. + multiple varicosities   Pulmonary/Chest: Effort normal and breath sounds normal.   Abdominal: Soft. Bowel sounds are normal. There is tenderness.   Grossly protuberant. Large hernia to left abdomen with tenderness    Musculoskeletal: Normal range of motion.   Neurological: She is alert and oriented to person, place, and time.   Skin: Skin is warm and dry.   Left abdomen with multiple ulcerations. Mild erythema surrounding ulcers with no warmth. Ulcerations with minimal drainage and areas of yellow and necrotic skin. See pics  Yeast rash to skin folds.   Psychiatric: She has a normal mood and affect. Her behavior is normal. Judgment normal.   Anxious    Nursing note and vitals reviewed.      Significant Labs:   BMP:   Recent Labs  Lab 03/19/17  0540   *      K 3.8      CO2 30*   BUN 10   CREATININE 0.6   CALCIUM 9.3     CBC:   Recent Labs  Lab 03/18/17  1410 03/19/17  0540   WBC 9.30 9.10   HGB 12.0 10.8*   HCT 38.7 34.3*    243       Significant Imaging: I have reviewed and interpreted all pertinent imaging results/findings within the past 24  hours.    Assessment/Plan:      * Nonhealing skin ulcer, limited to breakdown of skin  Initiated on Vancomycin. Consult wound care for recommendations. Continue debridement oint per [facility] wound care.       Diabetes mellitus with neuropathy  Chronic. Fall precautions.       Major depression, recurrent, chronic  Resume home antidepressants. Clonopin added TID      Tobacco dependency    Educated on smoking cessation.     Chronic atrial fibrillation  CVR resume home warfain      Hypertension associated with diabetes  Chronic. Resume home ACEI and Lasix. accuchecks ac and hs. Insulin correction scale      Morbid obesity with BMI of 45.0-49.9, adult  Low fat diabetic diet. Encourage exercise and diet modifications for weight loss      PVD (peripheral vascular disease)  As evidence by examination. Resume statin      Chronic combined systolic and diastolic congestive heart failure  Resume home lasix bid and ACEI.       Chronic respiratory failure with hypoxia  Resume  Home oxygen at 2 L nasal cannula. Monitor oxygen sat      Pressure ulcer, stage 3  To left abdomen. See above. Wound care and dietary consulted      VTE Risk Mitigation         Ordered     warfarin (COUMADIN) tablet 5 mg  Daily     Route:  Oral        03/18/17 1921     Medium Risk of VTE  Once      03/18/17 1732        Discussed POC with patient. Awaiting wound care evaluation in am.  Patient anxious due to possible need for surgical debridement.     Ca Dupont NP  Department of Hospital Medicine   [facility]

## 2017-03-20 NOTE — PROGRESS NOTES
Pre-Visit Chart Review  For Appointment Scheduled on 3/29/17    Health Maintenance Due   Topic Date Due    DEXA SCAN  11/15/1991    Zoster Vaccine  11/15/2011

## 2017-03-20 NOTE — PLAN OF CARE
Met with pt to complete her readmission questionnaire.  Pt's daughter was in the room.  Pt denies being depressed stating that she does not need any treatment for depression.  Pt reports that she had an appointment with Dr. Biggs at 9 a.m. This morning and will need to have the appointment rescheduled for Thurs or Fri, requesting an 11 a.m. Appointment.      Phone call to Ca at [facility] wound care, [phone] ext [phone] to update her.  She stated that pt already had an appointment scheduled with Dr. Gipson on Thurs., 3-23-17 @ 9 a.m.  She stated Dr. Biggs was only at the clinic on Mon and Tues.  Updated pt. On her cell 3 [phone].  Pt verified okay with the appointment this Thursday.  Updated pt's AVS and her nurse.  Plan for pt to discharge tomorrow.

## 2017-03-20 NOTE — PLAN OF CARE
Problem: Patient Care Overview  Goal: Plan of Care Review  Outcome: Ongoing (interventions implemented as appropriate)  PRN treatment was not required.

## 2017-03-20 NOTE — CONSULTS
Consult received weight management education. See education tab for more details.   Provided nutrition packet on CHO counting and 5 day sample menu on 1800 kcals/day.   Provided email should question arise.

## 2017-03-20 NOTE — CONSULTS
"Consult to Wound Care for abdominal break down. Picture taken upon admit.     Left Abdominal breakdown - Patient states this was a "bruise" that has continued to turn into a sore. Daughter has taken care of this at home, but it has gradually become worse. Patient states the area gets "very dry".  There was a heavy coat of Barrier Paste over the area, which was difficult to remove. I was not able to visualize the wound very well due to the layer of Barrier Paste. Cleaned with wound  and a Q-tip. Covered with a wound gel to help loosen the barrier paste and dark areas under the cream. Covered with an island dressing. Will re-evaluate tomorrow.  Jaimee Boo RN, CWOCN    "

## 2017-03-20 NOTE — CONSULTS
Nelly Tian [id number] is a 66 y.o. female who had been consulted for vancomycin dosing.    Vancomycin has been discontinued.  Pharmacy consult for vancomycin dosing in no longer required.      Thank you for allowing us to participate in this patient's care.     Ambrocio Guillen, PharmD

## 2017-03-21 ENCOUNTER — TELEPHONE (OUTPATIENT)
Dept: FAMILY MEDICINE | Facility: CLINIC | Age: 67
End: 2017-03-21

## 2017-03-21 ENCOUNTER — OUTPATIENT CASE MANAGEMENT (OUTPATIENT)
Dept: ADMINISTRATIVE | Facility: OTHER | Age: 67
End: 2017-03-21

## 2017-03-21 VITALS
SYSTOLIC BLOOD PRESSURE: 134 MMHG | HEART RATE: 76 BPM | BODY MASS INDEX: 44.41 KG/M2 | TEMPERATURE: 98 F | OXYGEN SATURATION: 94 % | WEIGHT: 293 LBS | DIASTOLIC BLOOD PRESSURE: 54 MMHG | HEIGHT: 68 IN | RESPIRATION RATE: 18 BRPM

## 2017-03-21 LAB
INR PPP: 2.4
PROTHROMBIN TIME: 24.1 SEC

## 2017-03-21 PROCEDURE — 27000221 HC OXYGEN, UP TO 24 HOURS

## 2017-03-21 PROCEDURE — 25000003 PHARM REV CODE 250: Performed by: SPECIALIST

## 2017-03-21 PROCEDURE — 36415 COLL VENOUS BLD VENIPUNCTURE: CPT

## 2017-03-21 PROCEDURE — 25000003 PHARM REV CODE 250: Performed by: NURSE PRACTITIONER

## 2017-03-21 PROCEDURE — 85610 PROTHROMBIN TIME: CPT

## 2017-03-21 PROCEDURE — 94761 N-INVAS EAR/PLS OXIMETRY MLT: CPT

## 2017-03-21 PROCEDURE — 99900035 HC TECH TIME PER 15 MIN (STAT)

## 2017-03-21 RX ORDER — AMOXICILLIN AND CLAVULANATE POTASSIUM 875; 125 MG/1; MG/1
1 TABLET, FILM COATED ORAL EVERY 12 HOURS
Qty: 14 TABLET | Refills: 0 | Status: SHIPPED | OUTPATIENT
Start: 2017-03-21 | End: 2017-03-28

## 2017-03-21 RX ADMIN — HYDROCODONE BITARTRATE AND ACETAMINOPHEN 1 TABLET: 10; 325 TABLET ORAL at 12:03

## 2017-03-21 RX ADMIN — THERA TABS 1 TABLET: TAB at 08:03

## 2017-03-21 RX ADMIN — FUROSEMIDE 40 MG: 20 TABLET ORAL at 08:03

## 2017-03-21 RX ADMIN — LISINOPRIL 20 MG: 10 TABLET ORAL at 08:03

## 2017-03-21 RX ADMIN — AMOXICILLIN AND CLAVULANATE POTASSIUM 1 TABLET: 875; 125 TABLET, FILM COATED ORAL at 08:03

## 2017-03-21 RX ADMIN — POTASSIUM CHLORIDE 20 MEQ: 1500 TABLET, EXTENDED RELEASE ORAL at 08:03

## 2017-03-21 RX ADMIN — CLONAZEPAM 1 MG: 1 TABLET ORAL at 05:03

## 2017-03-21 RX ADMIN — ATORVASTATIN CALCIUM 20 MG: 20 TABLET, FILM COATED ORAL at 08:03

## 2017-03-21 RX ADMIN — METOPROLOL SUCCINATE 12.5 MG: 25 TABLET, EXTENDED RELEASE ORAL at 08:03

## 2017-03-21 RX ADMIN — HYDROCODONE BITARTRATE AND ACETAMINOPHEN 1 TABLET: 10; 325 TABLET ORAL at 05:03

## 2017-03-21 NOTE — PLAN OF CARE
03/21/17 0800   PRE-TX-O2-ETCO2   O2 Device (Oxygen Therapy) nasal cannula   $ Is the patient on Oxygen? Yes   Flow (L/min) 2   Oxygen Concentration (%) 28   SpO2 (!) 94 %   Pulse Oximetry Type Intermittent   $ Pulse Oximetry - Multiple Charge Pulse Oximetry - Multiple   Pulse 76   Resp 18   Aerosol Therapy   $ Aerosol Therapy Charges PRN treatment not required       Patient up to bathroom upon entry to room and no oxygen in place. Room air SP02 86%. Patient placed back on oxygen and SPO2 increased to 94%. Aerosol treatments PRN. No treatment indicated at this time.

## 2017-03-21 NOTE — PLAN OF CARE
Problem: Patient Care Overview  Goal: Plan of Care Review  Outcome: Ongoing (interventions implemented as appropriate)  Vitals stable. POC reviewed, pt verbalized understanding. O2 2.5L per NC. Tele in place- sinus moody/sinus rhythm. PRN hydrocodone given for complaints of pain. Dressing covering abdominal wound. Pt up independently to BSC, non skid socks on. Pt continues to refuse placement of IV. PO abx administered. No falls/injuries thus far. Bed in lowest position, wheels locked, SR upx2, call light in easy reach. Will continue to monitor.

## 2017-03-21 NOTE — PLAN OF CARE
Left Abdominal Wound - Daughter present in room during dressing change. Patient and daughter given verbal instructions and a demonstration for wound care at home.    Patient given topical Lidocaine in hesham-wound area for pain during wound dressing change. There was a moderate amount of yellow slough on the dressing when removed.  Cleaned wound. The adherent dried, dark colored slough that was present yesterday had become very soft and easy to remove. There still remains a small amount of dark necrotic tissue in the wound bed, along with yellow adherent slough. Applied a thick layer of Santyl to all yellow adherent slough and dark necrotic tissue. Covered with gauze and island dressings. Secured dressing with silk tape.     Recommend: Apply Lidocaine topically to hesham-wound area. Clean wound with wound . Apply Santyl about a nickel thickness to yellow and dark adherent slough. Cover with gauze and an island dressing. Secure edges with silk or paper tape if needed.     Patient states she has an appointment at Dr. Biggs's office this Thursday. Follow up to be completed there.

## 2017-03-21 NOTE — PROGRESS NOTES
Attempted to schedule pt's hospital follow up with Dr. Bird.  Was informed the nurse would call pt to schedule.  Updated the AVS and pt's nurse.

## 2017-03-21 NOTE — PROGRESS NOTES
Patient complains of tenderness and swelling to IV site on R hand.  PIV removed, pressure applied.  IV intact.  Patient refusing new IV at this time.  Patient educated.  Will continue to monitor

## 2017-03-21 NOTE — PLAN OF CARE
03/21/17 1051   Final Note   Assessment Type Discharge Planning Assessment   Discharge Disposition Home-Health   Discharge planning education complete? Yes

## 2017-03-21 NOTE — PROGRESS NOTES
"3/21/17: LCSW contacted pt for follow up.  Pt self-reports recently being discharged from hospital due to skin ulcer.  Pt stated she will begin wound care at [facility] on Thursday.  Pt self-reports her depression as about the same.  LCSW asked pt if she would consider seeing a therapist for her depression and she said she would not; pt said, "I have too many things going on right now and I do not think I could handle another appointment at this time."    3/17/17: LCSW attempted to contact pt for follow up; no answer, left voicemail.  LCSW will send pt an "attempted to contact" message via pt portal.      3/13/17: LCSW attempted to contact pt for follow up; no answer, left voicemail.  LCSW did read encounter about pt being admitted to ED for abdominal wall ulcer with fat layer exposed on 3/10/17.    3/6/17: LCSW contacted pt to complete SW assessment.  This is a 67 yo female pt who lives with her daughter and grandchild.  Pt is a  and her late  served in the [employer]; they have not accessed [facility].  Pt has a hx of major depression chronic and anxiety.  Pt self-reports her depression coming as a result of losing some functionality approximately six years ago.  Pt states how she used to be someone who was "constantly on the go, and to not be able to do that anymore has caused me to have depression." Pt is not currently receiving counseling or therapy for depression but she does take Lexapro as prescribed.  Pt self-reports being able to perform most of her ADL's but she does occasionally receive assistance from her daughter and grandchild.      3/3/17: LCSW attempted to contact pt; no answer, left voicemail.  "

## 2017-03-21 NOTE — PROGRESS NOTES
SSC sent patient information to [facility] through [facility] system for processing and acceptance.  SSC contacted [facility] for replacement commode.  Per Heather with [facility], patient received commode less than a year ago and no longer is covered under warranty.  Patient would have to pay for a new commode at cost of $121.85.  [facility] informed PINKY Thao and JUANITA Duncan.  [place], [facility]    4:25pm SSC retrieved response from [facility].  Patient accepted.

## 2017-03-21 NOTE — TELEPHONE ENCOUNTER
Call placed to patient regarding need for hospital follow-up. Line busy. Will attempt to contact at later time.

## 2017-03-22 ENCOUNTER — PATIENT MESSAGE (OUTPATIENT)
Dept: FAMILY MEDICINE | Facility: CLINIC | Age: 67
End: 2017-03-22

## 2017-03-22 ENCOUNTER — ANTI-COAG VISIT (OUTPATIENT)
Dept: CARDIOLOGY | Facility: CLINIC | Age: 67
End: 2017-03-22

## 2017-03-22 DIAGNOSIS — Z79.01 LONG TERM CURRENT USE OF ANTICOAGULANT THERAPY: ICD-10-CM

## 2017-03-22 LAB — INR PPP: 2.5

## 2017-03-22 NOTE — DISCHARGE SUMMARY
"[facility]  Discharge Summary      Patient Name: Nelly Tian  MRN: [id number]  Admission Date: 3/18/2017  Hospital Length of Stay: 3 days  Discharge Date and Time: 3/21/2017 12:35 PM  Attending Physician: No att. providers found   Discharging Provider: Samuel Read NP  Primary Care Provider: Constantine Bird MD      HPI:   Nelly Tian is 66 year old female with PMH of morbid obesity, DM type 2, DM, HTN, atrial fibrillation, and morbid obesity who presents to the ED for evaluation of gradual worsening of multiple ulcerations to the Left abdomen for ~ 3 weeks. She states increased redness and pain to the area. Per pt's daughter ulcer progressively worsened and the area is now "extremely tender, erythematous, and warm to the touch".  She reports mild relief of pain with home narcotics. She currently is under the care of Wound care at [facility]. She was evaluated in the ED for similar complaint last week and initiated on  Oral Clindamycin. She reports no improvement with the antibiotic. Denies fever, chills, and SOB.     * No surgery found *      Indwelling Lines/Drains at time of discharge:   Lines/Drains/Airways     Pressure Ulcer                 Pressure Ulcer 03/20/17 0829 Stage II 2 days              Hospital Course:   Initiated on IV antibiotic for cellulitis. Wound care consulted. Images obtained. Patient doing well and ambulating in room.     Long discussion with patient regarding outpatient follow up. Patient canceled apts previously and states understanding.  Wound care changed dressing prior to DC. She is scheduled for apt in 2 days at [facility].     PE: skin: dressing intact. Chest: CTA       Consults:   Consults         Status Ordering Provider     Nutrition Services Referral  Once     Provider:  (Not yet assigned)    Completed SAMUEL READ          Significant Diagnostic Studies: Labs: BMP: No results for input(s): GLU, NA, K, CL, CO2, BUN, CREATININE, CALCIUM, " MG in the last 48 hours. and CMP No results for input(s): NA, K, CL, CO2, GLU, BUN, CREATININE, CALCIUM, PROT, ALBUMIN, BILITOT, ALKPHOS, AST, ALT, ANIONGAP, ESTGFRAFRICA, EGFRNONAA in the last 48 hours.    Pending Diagnostic Studies:     None        Final Active Diagnoses:    Diagnosis Date Noted POA    PRINCIPAL PROBLEM:  Nonhealing skin ulcer, limited to breakdown of skin [L98.491] 03/18/2017 Yes    Pressure ulcer, stage 3 [L89.93] 03/19/2017 Yes    Chronic respiratory failure with hypoxia [J96.11] 11/16/2016 Yes    Hypertension associated with diabetes [E11.59, I10] 01/19/2016 Yes    Morbid obesity with BMI of 45.0-49.9, adult [E66.01, Z68.42] 01/19/2016 Not Applicable    PVD (peripheral vascular disease) [I73.9] 01/19/2016 Yes    Chronic combined systolic and diastolic congestive heart failure [I50.42] 01/19/2016 Yes    Chronic atrial fibrillation [I48.2] 11/10/2015 Yes    Tobacco dependency [F17.200] 12/18/2014 Yes    Major depression, recurrent, chronic [F33.9]  Yes    Diabetes mellitus with neuropathy [E11.40] 08/13/2012 Yes     Chronic      Problems Resolved During this Admission:    Diagnosis Date Noted Date Resolved POA      No new Assessment & Plan notes have been filed under this hospital service since the last note was generated.  Service: Hospital Medicine      Discharged Condition: stable    Disposition: Home-Health Care [facility]    Follow Up:  Follow-up Information     Follow up with Godwin Gipson On 3/23/2017.    Why:  hospital follow up on 3-23-17 @ 9 a.m.    Contact information:    [address] [place] [address]  [phone] ext [phone]        Follow up with Constantine Bird MD In 1 week.    Specialty:  Family Medicine    Why:  hospital follow up- MD nurse will call to schedule the appointment     Contact information:    [address] [place] [address]  [phone]          Follow up with [facility].    Specialty:  Home Health Services    Why:  [facility]     "Contact information:    [address] [place] [address]  [phone]          Patient Instructions:     COMMODE FOR HOME USE   Order Specific Question Answer Comments   Type: Heavy duty drop arm    Height: 5' 8" (1.727 m)    Weight: 142 kg (313 lb)    Length of need (1-99 months): 99      Referral to Home health   Referral Priority: Routine Referral Type: Home Health Care   Referral Reason: Specialty Services Required    Requested Specialty: Home Health Services    Number of Visits Requested: 1      Diet general   Order Specific Question Answer Comments   Additional restrictions: Cardiac (Low Na/Chol)      Activity as tolerated     Call MD for:  severe uncontrolled pain     Call MD for:  redness, tenderness, or signs of infection (pain, swelling, redness, odor or green/yellow discharge around incision site)     Change dressing (specify)   Order Comments: Dressing change: Daily .       Medications:  Reconciled Home Medications:   Discharge Medication List as of 3/21/2017 11:45 AM      START taking these medications    Details   amoxicillin-clavulanate 875-125mg (AUGMENTIN) 875-125 mg per tablet Take 1 tablet by mouth every 12 (twelve) hours., Starting 3/21/2017, Until Tue 3/28/17, Normal         CONTINUE these medications which have NOT CHANGED    Details   acetaminophen (TYLENOL) 325 MG tablet Take 2 tablets (650 mg total) by mouth every 6 (six) hours as needed., Starting 11/5/2016, Until Discontinued, OTC      albuterol (ACCUNEB) 0.63 mg/3 mL Nebu INHALE THE CONTENTS OF 1 VIAL  BY  NEBULIZER EVERY 6 HOURS AS NEEDED, Normal      albuterol (VENTOLIN HFA) 90 mcg/actuation inhaler INHALE TWO PUFFS BY MOUTH EVERY 6 HOURS AS NEEDED FOR WHEEZING, Normal      albuterol-ipratropium 2.5mg-0.5mg/3mL (DUO-NEB) 0.5 mg-3 mg(2.5 mg base)/3 mL nebulizer solution INHALE THE CONTENTS OF 1 VIAL VIA NEBULIZER EVERY 6 HOURS AS NEEDED FOR  WHEEZING (DO NOT USE WITH ALBUTEROL INHALER), Normal      ascorbic acid, vitamin C, " (VITAMIN C) 500 MG tablet Take 1 tablet (500 mg total) by mouth every evening., Starting 2/19/2017, Until Discontinued, OTC      atorvastatin (LIPITOR) 20 MG tablet Take 1 tablet (20 mg total) by mouth once daily., Starting 12/29/2016, Until Fri 12/29/17, Normal      blood sugar diagnostic (TRUE METRIX GLUCOSE TEST STRIP) Strp Use to test blood sugar three times a day   DX:E11.9, Normal      blood-glucose meter (TRUE METRIX AIR GLUCOSE METER) kit Use as instructed to test blood sugar   DX:E11.9, Normal      budesonide (PULMICORT) 0.5 mg/2 mL nebulizer solution INHALE THE CONTENTS OF 1 VIAL VIA NEBULIZER EVERY DAY, Normal      clonazePAM (KLONOPIN) 1 MG tablet Take 1 tablet (1 mg total) by mouth 2 (two) times daily as needed for Anxiety., Starting 10/21/2016, Until Sat 10/21/17, Print      collagenase ointment Apply topically once daily., Starting 3/9/2017, Until Discontinued, Normal      escitalopram oxalate (LEXAPRO) 10 MG tablet TAKE 1 TABLET ONE TIME DAILY, Normal      furosemide (LASIX) 40 MG tablet Take 1 tablet (40 mg total) by mouth 2 (two) times daily., Starting 2/19/2017, Until Mon 2/19/18, Normal      gabapentin (NEURONTIN) 600 MG tablet Take 1 tablet (600 mg total) by mouth 3 (three) times daily., Starting 1/31/2017, Until Wed 1/31/18, Normal      hydrocodone-acetaminophen 10-325mg (NORCO)  mg Tab Take 1 tablet by mouth every 6 (six) hours as needed., Starting 3/9/2017, Until Discontinued, Print      lancets (TRUEPLUS LANCETS) 33 gauge Misc 1 lancet by Misc.(Non-Drug; Combo Route) route 3 (three) times daily. To test blood sugar   DX: E11.9, Starting 12/23/2016, Until Discontinued, Normal      levalbuterol (XOPENEX) 0.63 mg/3 mL nebulizer solution INHALE THE CONTENTS OF 1 VIAL BY NEBULIZATION 3 (THREE) TIMES DAILY., Normal      lisinopril (PRINIVIL,ZESTRIL) 20 MG tablet TAKE 1 TABLET ONE TIME DAILY, Normal      magnesium hydroxide 400 mg/5 ml (MILK OF MAGNESIA) 400 mg/5 mL Susp Take 30 mLs by mouth  daily as needed., Until Discontinued, Historical Med      metformin (GLUCOPHAGE) 500 MG tablet TAKE 1 TABLET TWICE DAILY WITH MEALS, Normal      methocarbamol (ROBAXIN) 750 MG Tab TAKE 1 TABLET FOUR TIMES DAILY, Normal      metoprolol succinate (TOPROL-XL) 25 MG 24 hr tablet Take 0.5 tablets (12.5 mg total) by mouth once daily., Starting 12/29/2016, Until Fri 3/9/18, Normal      miconazole NITRATE 2 % (MICOTIN) 2 % top powder Apply topically 2 (two) times daily. Skin folds, Starting 2/19/2017, Until Discontinued, OTC      multivitamin (THERAGRAN) tablet Take 1 tablet by mouth once daily., Starting 11/5/2016, Until Discontinued, OTC      polyethylene glycol (GLYCOLAX) 17 gram/dose powder MIX 17 GRAMS IN LIQUID AND DRINK EVERY DAY AS NEEDED, Normal      potassium chloride SA (K-DUR,KLOR-CON) 20 MEQ tablet Take 1 tablet (20 mEq total) by mouth once daily., Starting 2/19/2017, Until Discontinued, Normal      SPIRIVA WITH HANDIHALER 18 mcg inhalation capsule INHALE THE CONTENTS OF 1 CAPSULE EVERY DAY, Normal      temazepam (RESTORIL) 15 mg Cap TAKE 1 CAPSULE ONE TIME DAILY, Normal      trazodone (DESYREL) 150 MG tablet Take 1 tablet (150 mg total) by mouth every evening., Starting 3/9/2017, Until Discontinued, Normal      warfarin (COUMADIN) 5 MG tablet TAKE ONE TABLET EVERY DAY OR AS DIRECTED BY COUMADIN CLINIC, Normal         STOP taking these medications       ferrous sulfate 325 (65 FE) MG EC tablet Comments:   Reason for Stopping:             Time spent on the discharge of patient: 45  minutes    Ca Dupont NP  Department of Hospital Medicine  [facility]

## 2017-03-24 LAB
BACTERIA BLD CULT: NORMAL
BACTERIA BLD CULT: NORMAL

## 2017-03-28 NOTE — PATIENT INSTRUCTIONS
Diabetes (General Information)  Diabetes is a long-term health problem. It means your body does not make enough insulin. Or it may mean that your body cannot use the insulin it makes. Insulin is a hormone in your body. It lets blood sugar (glucose) reach the cells in your body. All of your cells need glucose for fuel.  When you have diabetes, the glucose in your blood builds up because it cannot get into the cells. This buildup is called high blood sugar (hyperglycemia).  Your blood sugar level depends on several things. It depends on what kind of food you eat and how much of it you eat. It also depends on how much exercise you get, and how much insulin you have in your body. Eating too much of the wrong kinds of food or not taking diabetes medicine on time can cause high blood sugar. Infections can cause high blood sugar even if you are taking medicines correctly.  These things can also cause low blood sugar:  · Missing meals  · Not eating enough food  · Taking too much diabetes medicine  Diabetes can cause serious problems over time if you do not get treated. These problems include heart disease, stroke, kidney failure, and blindness. They also include nerve pain or loss of feeling in your legs and feet, and gangrene of the feet. By keeping your blood sugar under control you can prevent or delay these problems.  Normal blood sugar levels are 80 to 100 before a meal and less than 180 in the 1 to 2 hours after a meal.  Home care  Follow these guidelines when caring for yourself at home:  · Follow the diet your healthcare provider gives you. Take insulin or other diabetes medicine exactly as told to.  · Watch your blood sugar as you are told to. Keep a log of your results. This will help your provider change your medicines to keep your blood sugar under control.  · Try to reach your ideal weight. You may be able to cut back on or not have to take diabetes medicine if you eat the right foods and get exercise.  · Do  not smoke. Smoking worsens the effects of diabetes on your circulation. You are much more likely to have a heart attack if you have diabetes and you smoke.  · Take good care of your feet. If you have lost feeling in your feet, you may not see an injury or infection. Check your feet and between your toes at least once a week.  · Wear a medical alert bracelet or necklace, or carry a card in your wallet that says you have diabetes. This will help healthcare providers give you the right care if you get very ill and cannot tell them that you have diabetes.  Sick day plan  If you get a cold, the flu, or a bacterial or viral infection, take these steps:  · Look at your diabetes sick plan and call your healthcare provider as you were told to. You may need to call your provider right away if:  ¨ Your blood sugar is above 240 while taking your diabetes medicine  ¨ Your urine ketone levels are above normal or high  ¨ You have been vomiting more than 6 hours  ¨ You have trouble breathing or your breath ha s a fruity smell  ¨ You have a high fever  ¨ You have a fever for several days and you are not getting better  ¨ You get light-headed and are sleepier than usual  · Keep taking your diabetes pills (oral medicine) even if you have been vomiting and are feeling sick. Call your provider right away because you may need insulin to lower your blood sugar until you recover from your illness.  · Keep taking your insulin even if you have been vomiting and are feeling sick. Call your provider right away to ask if you need to change your insulin dose. This will depend on your blood sugar results.  · Check your blood sugar every 2 to 4 hours, or at least 4 times a day.  · Check your ketones often. If you are vomiting and having diarrhea, watch them more often.  · Do not skip meals. Try to eat small meals on a regular schedule. Do this even if you do not feel like eating.  · Drink water or other liquids that do not have caffeine or  calories. This will keep you from getting dehydrated. If you are nauseated or vomiting, takes small sips every 5 minutes. To prevent dehydration try to drink a cup (8 ounces) of fluids every hour while you are awake.  General care  Always bring a source of fast-acting sugar with you in case you have symptoms of low blood sugar (below 70). At the first sign of low blood sugar, eat or drink 15 to 20 grams of fast-acting sugar to raise your blood sugar. Examples are:  · 3 to 4 glucose tablets. You can buy these at most [employer].  · 4 ounces (1/2 cup) of regular (not diet) soft drinks  · 4 ounces (1/2 cup) of any fruit juice  · 8 ounces (1 cup) of milk  · 5 to 6 pieces of hard candy  · 1 tablespoon of honey  Check your blood sugar 15 minutes after treating yourself. If it is still below 70, take 15 to 20 more grams of fast-acting sugar. Test again in 15 minutes. If it returns to normal (70 or above), eat a snack or meal to keep your blood sugar in a safe range. If it stays low, call your doctor or go to an emergency room.  Follow-up care  Follow-up with your healthcare provider, or as advised. For more information about diabetes, visit the American Diabetes Association website at www.diabetes.org or call 575-114-4721.  When to seek medical advice  Call your healthcare provider right away if you have any of these symptoms of high blood sugar:  · Frequent urination  · Dizziness  · Drowsiness  · Thirst  · Headache  · Nausea or vomiting  · Abdominal pain  · Eyesight changes  · Fast breathing  · Confusion or loss of consciousness  Also call your provider right away if you have any of these signs of low blood sugar:  · Fatigue  · Headache  · Shakes  · Excess sweating  · Hunger  · Feeling anxious or restless  · Eyesight changes  · Drowsiness  · Weakness  · Confusion or loss of consciousness  Call 911  Call for emergency help right away if any of these occur:  · Chest pain or shortness of breath  · Dizziness or  fainting  · Weakness of an arm or leg or one side of the face  · Trouble speaking or seeing   Date Last Reviewed: 6/1/2016 © 2000-2016 [employer]. [address] [place] [address], [place] [address]. All rights reserved. This information is not intended as a substitute for professional medical care. Always follow your healthcare professional's instructions.            Walking for Fitness  Fitness walking has something for everyone, even people who are already fit. Walking is one of the safest ways to condition your body aerobically. It can boost energy, help you lose weight, and reduce stress.    Physical benefits  · Walking strengthens your heart and lungs, and tones your muscles.  · When walking, your feet land with less impact than in other sports. This reduces chances of muscle, bone, and joint injury.  · Regular walking improves your cholesterol levels and lowers your risk of heart disease. And it helps you control your blood sugar if you have diabetes.  · Walking is a weight-bearing activity, which helps maintain bone density. This can help prevent osteoporosis.  Personal rewards  · Taking walks can help you relax and manage stress. And fitness walking may make you feel better about yourself.  · Walking can help you sleep better at night and make you less likely to be depressed.  · Regular walking may help maintain your memory as you get older.  · Walking is a great way to spend extra time with friends and family members. Be sure to invite your dog along!  Q&A about fitness walking  Q: Will walking keep me fit?  A: Yes. Regular walking at the right pace gives you all the benefits of other aerobic activities, such as jogging and swimming.  Q: Will walking help me lose weight and keep it off?  A: Yes. Per mile, walking can burn as many calories as jogging. Your health care provider can help work walking into your weight-loss plan.  Q: Is walking safe for my health?  A: Yes. Walking is safe if you have high  blood pressure, diabetes, heart disease, or other conditions. Talk to your health care provider before you start.  Date Last Reviewed: 5/9/2015 © 2000-2016 [employer]. [address] [place] [address], [place] [address]. All rights reserved. This information is not intended as a substitute for professional medical care. Always follow your healthcare professional's instructions.            Weight Management: Getting Started  Healthy bodies come in all shapes and sizes. Not all bodies are made to be thin. For some people, a healthy weight is higher than the average weight listed on weight charts. Your healthcare provider can help you decide on a healthy weight for you.    Reasons to lose weight  Losing weight can help with some health problems, such as high blood pressure, heart disease, diabetes, sleep apnea, and arthritis. You may also feel more energy.  Set your long-term goal  Your goal doesn't even have to be a specific weight. You may decide on a fitness goal (such as being able to walk 10 miles a week), or a health goal (such as lowering your blood pressure). Choose a goal that is measurable and reasonable, so you know when you've reached it. A goal of reaching a BMI of less than 25 is not always reasonable (or possible).   Make an action plan  Habits dont change overnight. Setting your goals too high can leave you feeling discouraged if you cant reach them. Be realistic. Choose one or two small changes you can make now. Set an action plan for how you are going to make these changes. When you can stick to this plan, keep making a few more small changes. Taking small steps will help you stay on the path to success.  Track your progress  Write down your goals. Then, keep a daily record of your progress. Write down what you eat and how active you are. This record lets you look back on how much youve done. It may also help when youre feeling frustrated. Reward yourself for success. Even if you dont reach  every goal, give yourself credit for what you do get done.  Get support  Encouragement from others can help make losing weight easier. Ask your family members and friends for support. They may even want to join you. Also look to your healthcare provider, registered dietitian, and  for help. Your local hospital can give you more information about nutrition, exercise, and weight loss.  Date Last Reviewed: 1/31/2016 © 2000-2016 [employer]. [address] [place] [address], [place] [address]. All rights reserved. This information is not intended as a substitute for professional medical care. Always follow your healthcare professional's instructions.            Established High Blood Pressure    High blood pressure (hypertension) is a chronic disease. Often health care providers dont know what causes it. But it can be caused by certain health conditions and medicines.  If you have high blood pressure, you may not have any symptoms. If you do have symptoms, they may include headache, dizziness, changes in your vision, chest pain, and shortness of breath. But even without symptoms, high blood pressure thats not treated raises your risk for heart attack and stroke. High blood pressure is a serious health risk and shouldnt be ignored.  A blood pressure reading is made up of two numbers: a higher number over a lower number. The top number is the systolic pressure. The bottom number is the diastolic pressure. A normal blood pressure is less than 120 over less than 80.  High blood pressure is when either the top number is 140 or higher, or the bottom number is 90 or higher. This must be the result when taking your blood pressure a number of times. The blood pressures between normal and high are called prehypertension.  Home care  If you have high blood pressure, you should do what is listed below to lower your blood pressure. If you are taking medicines for high blood pressure, these methods may  reduce or end your need for medicines in the future.  · Begin a weight-loss program if you are overweight.  · Cut back on how much salt you get in your diet. Heres how to do this:  ¨ Dont eat foods that have a lot of salt. These include olives, pickles, smoked meats, and salted potato chips.  ¨ Dont add salt to your food at the table.  ¨ Use only small amounts of salt when cooking.  · Begin an exercise program. Talk with your health care provider about the type of exercise program that would be best for you. It doesn't have to be hard. Even brisk walking for 20 minutes 3 times a week is a good form of exercise.  · Dont take medicines that have heart stimulants. This includes many cold and sinus decongestant pills and sprays, as well as diet pills. Check the warnings about hypertension on the label. Stimulants such as amphetamine or cocaine could be lethal for someone with high blood pressure. Never take these.  · Limit how much caffeine you get in your diet. Switch to caffeine-free products.  · Stop smoking. If you are a long-time smoker, this can be hard. Enroll in a stop-smoking program to make it more likely that you will quit for good.  · Learn how to handle stress. This is an important part of any program to lower blood pressure. Learn about relaxation methods like meditation, yoga, or biofeedback.  · If your provider prescribed medicines, take them exactly as directed. Missing doses may cause your blood pressure get out of control.  · Consider buying an automatic blood pressure machine. You can get one of these at most pharmacies. Use this to watch your blood pressure at home. Give the results to your provider.  Follow-up care  You will need to make regular visits to your health care provider. This is to check your blood pressure and to make changes to your medicines. Make a follow-up appointment as directed.  When to seek medical advice  Call your health care provider right away if any of these  occur:  · Chest pain or shortness of breath  · Severe headache  · Throbbing or rushing sound in the ears  · Nosebleed  · Sudden severe pain in your belly (abdomen)  · Extreme drowsiness, confusion, or fainting  · Dizziness or dizziness with a spinning sensation (vertigo)  · Weakness of an arm or leg or one side of the face  · You have problems speaking or seeing   Date Last Reviewed: 11/25/2014  © 0070-5833 [employer]. [address] [place], [place] [address]. All rights reserved. This information is not intended as a substitute for professional medical care. Always follow your healthcare professional's instructions.

## 2017-03-29 ENCOUNTER — OUTPATIENT CASE MANAGEMENT (OUTPATIENT)
Dept: ADMINISTRATIVE | Facility: OTHER | Age: 67
End: 2017-03-29

## 2017-03-29 ENCOUNTER — OFFICE VISIT (OUTPATIENT)
Dept: FAMILY MEDICINE | Facility: CLINIC | Age: 67
End: 2017-03-29
Payer: MEDICARE

## 2017-03-29 ENCOUNTER — ANTI-COAG VISIT (OUTPATIENT)
Dept: CARDIOLOGY | Facility: CLINIC | Age: 67
End: 2017-03-29

## 2017-03-29 ENCOUNTER — CLINICAL SUPPORT (OUTPATIENT)
Dept: SMOKING CESSATION | Facility: CLINIC | Age: 67
End: 2017-03-29
Payer: COMMERCIAL

## 2017-03-29 VITALS
HEART RATE: 68 BPM | BODY MASS INDEX: 44.41 KG/M2 | RESPIRATION RATE: 16 BRPM | HEIGHT: 68 IN | WEIGHT: 293 LBS | OXYGEN SATURATION: 99 % | DIASTOLIC BLOOD PRESSURE: 60 MMHG | SYSTOLIC BLOOD PRESSURE: 127 MMHG

## 2017-03-29 DIAGNOSIS — Z79.01 LONG TERM CURRENT USE OF ANTICOAGULANT THERAPY: ICD-10-CM

## 2017-03-29 DIAGNOSIS — E11.59 HYPERTENSION ASSOCIATED WITH DIABETES: Primary | ICD-10-CM

## 2017-03-29 DIAGNOSIS — E66.01 OBESITY, CLASS III, BMI 40-49.9 (MORBID OBESITY): ICD-10-CM

## 2017-03-29 DIAGNOSIS — F17.200 NICOTINE DEPENDENCE: Primary | ICD-10-CM

## 2017-03-29 DIAGNOSIS — L98.491 NONHEALING SKIN ULCER, LIMITED TO BREAKDOWN OF SKIN: ICD-10-CM

## 2017-03-29 DIAGNOSIS — F17.200 TOBACCO DEPENDENCY: ICD-10-CM

## 2017-03-29 DIAGNOSIS — E11.9 CONTROLLED TYPE 2 DIABETES MELLITUS WITHOUT COMPLICATION, WITHOUT LONG-TERM CURRENT USE OF INSULIN: Chronic | ICD-10-CM

## 2017-03-29 DIAGNOSIS — I15.2 HYPERTENSION ASSOCIATED WITH DIABETES: Primary | ICD-10-CM

## 2017-03-29 LAB — INR PPP: 2.9

## 2017-03-29 PROCEDURE — 99404 PREV MED CNSL INDIV APPRX 60: CPT | Mod: S$GLB,,,

## 2017-03-29 PROCEDURE — 99999 PR PBB SHADOW E&M-EST. PATIENT-LVL I: CPT | Mod: PBBFAC,,,

## 2017-03-29 PROCEDURE — 99499 UNLISTED E&M SERVICE: CPT | Mod: S$GLB,,, | Performed by: PHYSICIAN ASSISTANT

## 2017-03-29 PROCEDURE — 99999 PR PBB SHADOW E&M-EST. PATIENT-LVL V: CPT | Mod: PBBFAC,,, | Performed by: PHYSICIAN ASSISTANT

## 2017-03-29 RX ORDER — LIDOCAINE AND PRILOCAINE 25; 25 MG/G; MG/G
CREAM TOPICAL
Qty: 90 G | Refills: 3 | Status: SHIPPED | OUTPATIENT
Start: 2017-03-29 | End: 2017-04-05

## 2017-03-29 RX ORDER — OXYCODONE HYDROCHLORIDE 10 MG/1
10 TABLET ORAL EVERY 12 HOURS PRN
Qty: 60 TABLET | Refills: 0 | Status: SHIPPED | OUTPATIENT
Start: 2017-03-29 | End: 2017-03-31 | Stop reason: SDUPTHER

## 2017-03-29 RX ORDER — OXYCODONE HYDROCHLORIDE 10 MG/1
10 TABLET ORAL EVERY 12 HOURS PRN
Qty: 60 TABLET | Refills: 0 | Status: SHIPPED | OUTPATIENT
Start: 2017-03-29 | End: 2017-03-29 | Stop reason: SDUPTHER

## 2017-03-29 NOTE — Clinical Note
Patient was seen in clinic today for Tobacco Cessation. Patient returns to the clinic today for tobacco cessation Quit 2. She is smoking about 1 pack of cigarettes per day. She is ready to quit for health reasons. She states that she lives with many smokers, so is a challenge for her. She states that in her past experience, she could not use the generic nicotine patch due to an allergic reaction to the adhesive and fabric. She also declines Chantix for tobacco cessation. She would like to use the Nicoderm CQ patches 21 mg QD, I will call to request approval. Patient is aware it will be pending approval. Patient will come to clinic for individual follow up appointments.

## 2017-03-29 NOTE — PROGRESS NOTES
03/29/17-Called and patient has home health nurse  at the house. Will follow up with patient tomorrow.

## 2017-03-29 NOTE — TELEPHONE ENCOUNTER
Patient received refill of Oxycodone on this date. Pharmacy does not have medication in stock and will have to order it. Patient states she can not wait a week or more for this medication. Requesting refill be sent to [facility] on [address]. Please advise.

## 2017-03-29 NOTE — PROGRESS NOTES
"3/29/17: LCSW attempted to contact pt for follow up.  Pt answered but asked if LCSW could call her back in 15 minutes.  LCSW attempted to call pt back in 15 minutes; no answer, left voicemail.      3/21/17: LCSW contacted pt for follow up.  Pt self-reports recently being discharged from hospital due to skin ulcer.  Pt stated she will begin wound care at [facility] on Thursday.  Pt self-reports her depression as about the same.  LCSW asked pt if she would consider seeing a therapist for her depression and she said she would not; pt said, "I have too many things going on right now and I do not think I could handle another appointment at this time."    3/17/17: LCSW attempted to contact pt for follow up; no answer, left voicemail.  LCSW will send pt an "attempted to contact" message via pt portal.      3/13/17: LCSW attempted to contact pt for follow up; no answer, left voicemail.  LCSW did read encounter about pt being admitted to ED for abdominal wall ulcer with fat layer exposed on 3/10/17.    3/6/17: LCSW contacted pt to complete SW assessment.  This is a 65 yo female pt who lives with her daughter and grandchild.  Pt is a  and her late  served in the [employer]; they have not accessed [facility] for [facility].  Pt has a hx of major depression chronic and anxiety.  Pt self-reports her depression coming as a result of losing some functionality approximately six years ago.  Pt states how she used to be someone who was "constantly on the go, and to not be able to do that anymore has caused me to have depression." Pt is not currently receiving counseling or therapy for depression but she does take Lexapro as prescribed.  Pt self-reports being able to perform most of her ADL's but she does occasionally receive assistance from her daughter and grandchild.      3/3/17: LCSW attempted to contact pt; no answer, left voicemail.  "

## 2017-03-29 NOTE — PROGRESS NOTES
Subjective:       Patient ID: Nelly Tian is a 66 y.o. female.    Chief Complaint: Transitional Care    HPI Comments: Transitional Care Note  Admit date: 03/18/2017  Discharge date: 03/21/2017  Date of interactive contact (2 business days post D/C): 03/21/2017  Hospitalized at: [facility]  Discharge diagnoses: Pressure ulcers    Family and/or Caretaker present at visit?  Yes.  Diagnostic tests reviewed/disposition: I have reviewed all completed as well as pending diagnostic tests at the time of discharge.  Disease/illness education: Patient advised to continue going to wound care and changing wound dressing daily  Medication changes: Patient was started on oral antibiotics, Augmentin. Patient also referred to wound care  Home health/community services discussion/referrals: Patient has home health established at [facility].   Establishment or re-establishment of referral orders for community resources: No other necessary community resources.   Discussion with other health care providers: discussed patient's status and pain level with Dr. Bird, patient's PCP.    Ms. Tian comes to clinic today accompanied by her daughter who is also her primary caretaker. Ms. Tian was recently hospitalized at [facility] for cellulitis and pressure wound. The patient presented to ED due to worsening of the wounds and redness x 3 weeks. The patient has severe pain related to the wounds. Prior to going to hospital she has been seen at [facility] wound care. The patient was previously treated with oral clindamycin in the ED; she had no improvement. The patient was admitted and was treated with IV antibiotics. She improved with IV antibiotics and was discharged with oral antibiotics. The patient was encouraged to keep appointments with wound care at [facility] as she had cancelled appointments in the past. The patient also has home health through [facility]. The patient's daughter helps to care for her and performs dressing  changes twice daily. The patient reports her pain is severe. Her current pain medication regimen is not helping her pain level. The patient is in tears due to her level of pain and discomfort. It is difficult for her to find a comfortable position with the pressure sores.        Review of Systems   Constitutional: Negative for activity change, appetite change and fever.   HENT: Negative for postnasal drip, rhinorrhea and sinus pressure.    Eyes: Negative for visual disturbance.   Respiratory: Negative for cough.    Cardiovascular: Negative for chest pain.   Gastrointestinal: Negative for abdominal distention and abdominal pain.   Genitourinary: Negative for difficulty urinating and dysuria.   Musculoskeletal: Positive for arthralgias, back pain and myalgias.        Chronic back pain   Skin: Positive for color change and wound.   Neurological: Negative for headaches.   Hematological: Negative for adenopathy.   Psychiatric/Behavioral: The patient is not nervous/anxious.        Objective:      Physical Exam   Constitutional: She is oriented to person, place, and time.   HENT:   Mouth/Throat: Oropharynx is clear and moist. No oropharyngeal exudate.   Nasal canula in place   Eyes: Conjunctivae are normal. Pupils are equal, round, and reactive to light.   Cardiovascular: Normal rate and regular rhythm.    Pulmonary/Chest: Effort normal and breath sounds normal. She has no wheezes.   Decreased breath sounds (chronic due to COPD)   Abdominal: Soft. Bowel sounds are normal. There is no tenderness.   Large hernia at left lower abdomen   Musculoskeletal: She exhibits no edema.   Lymphadenopathy:     She has no cervical adenopathy.   Neurological: She is alert and oriented to person, place, and time.   Skin: No erythema.   Large decubitus ulcers present on left lower abdominal hernia. See photos below   Psychiatric: Her behavior is normal.                       Assessment:       1. Hypertension associated with diabetes    2.  Nonhealing skin ulcer, limited to breakdown of skin    3. Controlled type 2 diabetes mellitus without complication, without long-term current use of insulin    4. Tobacco dependency    5. Obesity, Class III, BMI 40-49.9 (morbid obesity)        Plan:         Nelly was seen today for transitional care.    Diagnoses and all orders for this visit:    Hypertension associated with diabetes  Controlled  Low salt diet  Continue current medication  Nonhealing skin ulcer, limited to breakdown of skin  -     lidocaine-prilocaine (EMLA) cream; 2 gms to affected area 1/2 hr  before each wound dressing.  -     oxycodone (ROXICODONE) 10 mg Tab immediate release tablet; Take 1 tablet (10 mg total) by mouth every 12 (twelve) hours as needed for Pain.  -     HOSPITAL BED FOR HOME USE  Continue wound care weekly  Continue HH  Dr. Dejesus sent in additional medication for breakthrough pain  Controlled type 2 diabetes mellitus without complication, without long-term current use of insulin  Controlled  Continue current medication  Tobacco dependency  Patient to re enroll in smoking cessation program  Obesity, Class III, BMI 40-49.9 (morbid obesity)  Low carbohydrate, high fiber diet  Increase activity as tolerated.     Patient readiness: acceptance and barriers:none    During the course of the visit the patient was educated and counseled about the following:     Diabetes:  Discussed general issues about diabetes pathophysiology and management.  Educational material distributed.  Addressed ADA diet.  Suggested low cholesterol diet.  Encouraged aerobic exercise.  Discussed foot care.  Reminded to get yearly retinal exam.  Hypertension:   Medication: no change.  Dietary sodium restriction.  Regular aerobic exercise.  Obesity:   General weight loss/lifestyle modification strategies discussed (elicit support from others; identify saboteurs; non-food rewards, etc).  Informal exercise measures discussed, e.g. taking stairs instead of  elevator.    Goals: Diabetes: Maintain Hemoglobin A1C below 7, Hypertension: Reduce Blood Pressure and Obesity: Reduce calorie intake and BMI    Did patient meet goals/outcomes: No    The following self management tools provided: declined    Patient Instructions (the written plan) was given to the patient/family.     Time spent with patient: 30 minutes

## 2017-03-29 NOTE — TELEPHONE ENCOUNTER
----- Message from Ana Garcia sent at 3/29/2017  3:17 PM CDT -----  Contact: Daughter Maye Tirado  States [facility] [address] does not have   Rx oxycodone (ROXICODONE) 10 mg   Patient cannot wait for one week. Wants to see if they can it can be sent to     Requesting to see if the other [facility] haveit and insurance will cover  [facility] [address] or [address]   Call back [phone]

## 2017-03-29 NOTE — MR AVS SNAPSHOT
[facility]  [address]  [place] [address]  Phone: [phone]  Fax: [phone]                  Nelly Tian   3/29/2017 11:00 AM   Office Visit    Description:  Female : 1950   Provider:  Marlyn Moctezuma PA-C   Department:  [facility] Medicine           Reason for Visit     Transitional Care           Diagnoses this Visit        Comments    Hypertension associated with diabetes    -  Primary     Nonhealing skin ulcer, limited to breakdown of skin         Controlled type 2 diabetes mellitus without complication, without long-term current use of insulin         Tobacco dependency         Obesity, Class III, BMI 40-49.9 (morbid obesity)                To Do List           Future Appointments        Provider Department Dept Phone    2017 10:00 AM Marlyn Moctezuma PA-C [facility] [phone]    2017 9:20 AM [facility] [phone]    2017 1:20 PM Laura Ramos MD [facility] - [facility] On[facility] [phone]    2017 1:30 PM Chu Mack MD [facility] Cardiology [phone]    2017 11:30 AM Constantine Brid MD [facility] [phone]      Goals (5 Years of Data)              3/21/17    3/15/17    Patient/caregiver will accept life style changes to manage and improve  CHF  prior to discharge from [facility].     On track    Notes - Note edited  3/15/2017  2:56 PM by Jayde Reyes    Overall Time to Completion  2 months from 2017    Short Term Goals  Patient/caregiver will discuss health care needs with Physician and care team during visits or using Patient Portal.  Interventions   · Collaborate with Physician as appropriate to meet patient needs.  · Discuss appropriate use of Home health with patient/caregiver.  · Empower patient/caregiver to discuss treatment plan with Physician/care team.  · Provide contact information for [facility] contact  information.  · Provide contact information for Outpatient Case Management contact information.  · Provide contact information for Physician office phone number.  · Encourage compliance with Physician follow-ups.   Status  · Met     Patient/caregiver will notify doctor if patient gains more than 3 pounds in one day or 5 pounds in one week within 72 hours.  Interventions   · Recognize and provide educational material (LESLIEMES).  · Mail Weight log for home use.   Status  · Met     Patient/caregiver will verbalize 2 food items Low Sodium within 1 month.  Interventions   · Recognize and provide educational material (KRAMES).  · Encourage Dietary Compliance.  · Review eating/nutrition habits.   Status  · Partially met    Patient/caregiver will verbalize 2 signs and symptoms of Congestive Heart Failure within 2 months.   Interventions   · Recognize and provide educational material (KRAMES).   Status  · Met     Patient/caregiver will verbalize understanding and will follow hospital discharge plan 1 week   Interventions   · Encourage Dietary Compliance.  · Encourage Medication Compliance.  · Encourage Smoking Cessation.   Status  · Met           Patient/caregiver will have adequate mental health support/resources prior to discharge from [facility].   On track  On track    Notes - Note created  3/6/2017  9:42 AM by Chu Desai III, LCSW    Overall Time to Completion  2 months from 03/06/2017    Short Term Goals  Patient/caregiver will contact [employer] for guidance within 1 month.  Interventions   · Collaborate with external provider as appropriate to meet patient needs.   · Coordinate mental health services.  · Encourage communication with providers.  · Encourage compliance with medical/mental health appointments.  · Encourage family/social  and involvement in care.  · Facilitate referrals as appropriate.  · Provide crisis intervention hotline.  · Provide information on [employer] Services  [employer].  · Provide mental/behavioral health resource(s).  · Provide support group information.  · Provide supportive counseling.   Status  · Partially met            COMPLETED: Patient/caregiver will have an action plan in place to manage and prevent complications of falls  prior to discharge from [facility].         Notes - Note edited  3/1/2017  1:47 PM by Jayde Reyes    Overall Time to Completion  2 months from 02/21/2017    Short Term Goals  Patient/caregiver will put in place 2 keeping room free of clutter, making sure no electrical cords placed in walk ways and making sure rooms are well lit measures to decrease the risk of patient falls within 1 month.  Interventions   · Recognize and provide educational material (SLIME).   Status  · Met             Patient/caregiver will have an action plan in place to manage and prevent complications of infectious disesase  prior to discharge from [facility].         Notes - Note created  3/15/2017  3:12 PM by Jayde Reyes    Overall Time to Completion  1 month from 03/15/2017    Short Term Goals  Patient/caregiver will identify 2 deficits resulting from infectious disease wound  and list 2 ways to overcome those deficits within 1 month.  Interventions   · Collaborate with Physician as appropriate to meet patient needs.  · Discuss alternate Levels of Care with patient/caregiver.  · Discuss appropriate use of Home health with patient/caregiver.  · Empower patient/caregiver to discuss treatment plan with Physician/care team.  · Recognize and provide educational material (SLIME).   Status  · Partially met    Patient/caregiver will identify 1 supports or services to maintain or improve current functional status within 2 weeks.  Interventions   · Refer to [facility] wound care-appt on 03/20/17   Status  · Partially met    Patient/caregiver will verbalize 2 strategies to limit/reduce the risk of infections within 1 month.  Interventions   · Recognize and provide educational material  (SLIME).  · Encourage Dietary Compliance.  · Review eating/nutrition habits.  · Complete medication reconciliation.  · Encourage Medication Compliance.   Status  · Partially met    Patient/caregiver will verbalize 1 red flags of infectious disease  and know when to contact Physician within 1 month.  Interventions   · Recognize and provide educational material (SLIME).   Status  · Partially met        ·           COMPLETED: Patient/caregiver will have knowledge of resources available in order to obtain the services that are needed prior to discharge from [facility].         Notes - Note edited  3/1/2017  1:47 PM by Jayde Reyes    Overall Time to Completion  2 months from 02/21/2017    Short Term Goals  Patient/caregiver will have contact information for identified community resources IE:[employer] for eyeglasses, [facility], Food Pantries   for follow-up within 1 month.  Interventions   · Provide contact information for [employer] (Aunt Christal).   Status  · Met           COMPLETED: Patient/caregiver will have knowledge of resources available in order to obtain the services that are needed prior to discharge from [facility].         Notes - Note edited  3/15/2017  2:57 PM by Jayde Reyes    Overall Time to Completion  1 month from 03/01/2017    Short Term Goals  Patient/caregiver will have contact information for identified community resources IE:[facility]  for follow-up within 2 weeks.  Interventions   · Refer to Outpatient Case Management Social Worker.   Status  · Met    Patient/caregiver will identify 2 supports or services to maintain or improve current functional status within 2 weeks.  Interventions   · Refer to Outpatient Case Management Social Worker.  · Provide contact information for [employer] ( Christal).   Status  · Met            [facility]     [facility] Nurse Care Line - 24/7 Assistance  Registered nurses in the  [facility] provide clinical advisement, health education, appointment booking, and other advisory services.  Call for this free service at [phone].             Medications           Message regarding Medications     Verify the changes and/or additions to your medication regime listed below are the same as discussed with your clinician today.  If any of these changes or additions are incorrect, please notify your healthcare provider.             Verify that the below list of medications is an accurate representation of the medications you are currently taking.  If none reported, the list may be blank. If incorrect, please contact your healthcare provider. Carry this list with you in case of emergency.           Current Medications     acetaminophen (TYLENOL) 325 MG tablet Take 2 tablets (650 mg total) by mouth every 6 (six) hours as needed.    albuterol (ACCUNEB) 0.63 mg/3 mL Nebu INHALE THE CONTENTS OF 1 VIAL  BY  NEBULIZER EVERY 6 HOURS AS NEEDED    albuterol (VENTOLIN HFA) 90 mcg/actuation inhaler INHALE TWO PUFFS BY MOUTH EVERY 6 HOURS AS NEEDED FOR WHEEZING    albuterol-ipratropium 2.5mg-0.5mg/3mL (DUO-NEB) 0.5 mg-3 mg(2.5 mg base)/3 mL nebulizer solution INHALE THE CONTENTS OF 1 VIAL VIA NEBULIZER EVERY 6 HOURS AS NEEDED FOR  WHEEZING (DO NOT USE WITH ALBUTEROL INHALER)    ascorbic acid, vitamin C, (VITAMIN C) 500 MG tablet Take 1 tablet (500 mg total) by mouth every evening.    atorvastatin (LIPITOR) 20 MG tablet Take 1 tablet (20 mg total) by mouth once daily.    blood sugar diagnostic (TRUE METRIX GLUCOSE TEST STRIP) Strp Use to test blood sugar three times a day   DX:E11.9    blood-glucose meter (TRUE METRIX AIR GLUCOSE METER) kit Use as instructed to test blood sugar   DX:E11.9    budesonide (PULMICORT) 0.5 mg/2 mL nebulizer solution INHALE THE CONTENTS OF 1 VIAL VIA NEBULIZER EVERY DAY    clonazePAM (KLONOPIN) 1 MG tablet Take 1 tablet (1 mg total) by mouth 2 (two) times daily as needed  for Anxiety.    collagenase ointment Apply topically once daily.    escitalopram oxalate (LEXAPRO) 10 MG tablet TAKE 1 TABLET ONE TIME DAILY    furosemide (LASIX) 40 MG tablet Take 1 tablet (40 mg total) by mouth 2 (two) times daily.    gabapentin (NEURONTIN) 600 MG tablet Take 1 tablet (600 mg total) by mouth 3 (three) times daily.    hydrocodone-acetaminophen 10-325mg (NORCO)  mg Tab Take 1 tablet by mouth every 6 (six) hours as needed.    lancets (TRUEPLUS LANCETS) 33 gauge Misc 1 lancet by Misc.(Non-Drug; Combo Route) route 3 (three) times daily. To test blood sugar   DX: E11.9    levalbuterol (XOPENEX) 0.63 mg/3 mL nebulizer solution INHALE THE CONTENTS OF 1 VIAL BY NEBULIZATION 3 (THREE) TIMES DAILY.    lisinopril (PRINIVIL,ZESTRIL) 20 MG tablet TAKE 1 TABLET ONE TIME DAILY    magnesium hydroxide 400 mg/5 ml (MILK OF MAGNESIA) 400 mg/5 mL Susp Take 30 mLs by mouth daily as needed.    metformin (GLUCOPHAGE) 500 MG tablet TAKE 1 TABLET TWICE DAILY WITH MEALS    methocarbamol (ROBAXIN) 750 MG Tab TAKE 1 TABLET FOUR TIMES DAILY    metoprolol succinate (TOPROL-XL) 25 MG 24 hr tablet Take 0.5 tablets (12.5 mg total) by mouth once daily.    miconazole NITRATE 2 % (MICOTIN) 2 % top powder Apply topically 2 (two) times daily. Skin folds    multivitamin (THERAGRAN) tablet Take 1 tablet by mouth once daily.    polyethylene glycol (GLYCOLAX) 17 gram/dose powder MIX 17 GRAMS IN LIQUID AND DRINK EVERY DAY AS NEEDED    potassium chloride SA (K-DUR,KLOR-CON) 20 MEQ tablet Take 1 tablet (20 mEq total) by mouth once daily.    SPIRIVA WITH HANDIHALER 18 mcg inhalation capsule INHALE THE CONTENTS OF 1 CAPSULE EVERY DAY    temazepam (RESTORIL) 15 mg Cap TAKE 1 CAPSULE ONE TIME DAILY    trazodone (DESYREL) 150 MG tablet Take 1 tablet (150 mg total) by mouth every evening.    warfarin (COUMADIN) 5 MG tablet TAKE ONE TABLET EVERY DAY OR AS DIRECTED BY COUMADIN CLINIC           Clinical Reference Information           Your  "Vitals Were     BP Pulse Resp Height Weight SpO2    127/60 (BP Location: Right arm, Patient Position: Sitting, BP Method: Automatic) 68 16 5' 8" (1.727 m) 137.7 kg (303 lb 9.2 oz) 99%    BMI                46.16 kg/m2          Blood Pressure          Most Recent Value    BP  127/60      Allergies as of 3/29/2017     Dilaudid [Hydromorphone]      Immunizations Administered on Date of Encounter - 3/29/2017     None      Instructions      Diabetes (General Information)  Diabetes is a long-term health problem. It means your body does not make enough insulin. Or it may mean that your body cannot use the insulin it makes. Insulin is a hormone in your body. It lets blood sugar (glucose) reach the cells in your body. All of your cells need glucose for fuel.  When you have diabetes, the glucose in your blood builds up because it cannot get into the cells. This buildup is called high blood sugar (hyperglycemia).  Your blood sugar level depends on several things. It depends on what kind of food you eat and how much of it you eat. It also depends on how much exercise you get, and how much insulin you have in your body. Eating too much of the wrong kinds of food or not taking diabetes medicine on time can cause high blood sugar. Infections can cause high blood sugar even if you are taking medicines correctly.  These things can also cause low blood sugar:  · Missing meals  · Not eating enough food  · Taking too much diabetes medicine  Diabetes can cause serious problems over time if you do not get treated. These problems include heart disease, stroke, kidney failure, and blindness. They also include nerve pain or loss of feeling in your legs and feet, and gangrene of the feet. By keeping your blood sugar under control you can prevent or delay these problems.  Normal blood sugar levels are 80 to 100 before a meal and less than 180 in the 1 to 2 hours after a meal.  Home care  Follow these guidelines when caring for yourself at " home:  · Follow the diet your healthcare provider gives you. Take insulin or other diabetes medicine exactly as told to.  · Watch your blood sugar as you are told to. Keep a log of your results. This will help your provider change your medicines to keep your blood sugar under control.  · Try to reach your ideal weight. You may be able to cut back on or not have to take diabetes medicine if you eat the right foods and get exercise.  · Do not smoke. Smoking worsens the effects of diabetes on your circulation. You are much more likely to have a heart attack if you have diabetes and you smoke.  · Take good care of your feet. If you have lost feeling in your feet, you may not see an injury or infection. Check your feet and between your toes at least once a week.  · Wear a medical alert bracelet or necklace, or carry a card in your wallet that says you have diabetes. This will help healthcare providers give you the right care if you get very ill and cannot tell them that you have diabetes.  Sick day plan  If you get a cold, the flu, or a bacterial or viral infection, take these steps:  · Look at your diabetes sick plan and call your healthcare provider as you were told to. You may need to call your provider right away if:  ¨ Your blood sugar is above 240 while taking your diabetes medicine  ¨ Your urine ketone levels are above normal or high  ¨ You have been vomiting more than 6 hours  ¨ You have trouble breathing or your breath ha s a fruity smell  ¨ You have a high fever  ¨ You have a fever for several days and you are not getting better  ¨ You get light-headed and are sleepier than usual  · Keep taking your diabetes pills (oral medicine) even if you have been vomiting and are feeling sick. Call your provider right away because you may need insulin to lower your blood sugar until you recover from your illness.  · Keep taking your insulin even if you have been vomiting and are feeling sick. Call your provider right away  to ask if you need to change your insulin dose. This will depend on your blood sugar results.  · Check your blood sugar every 2 to 4 hours, or at least 4 times a day.  · Check your ketones often. If you are vomiting and having diarrhea, watch them more often.  · Do not skip meals. Try to eat small meals on a regular schedule. Do this even if you do not feel like eating.  · Drink water or other liquids that do not have caffeine or calories. This will keep you from getting dehydrated. If you are nauseated or vomiting, takes small sips every 5 minutes. To prevent dehydration try to drink a cup (8 ounces) of fluids every hour while you are awake.  General care  Always bring a source of fast-acting sugar with you in case you have symptoms of low blood sugar (below 70). At the first sign of low blood sugar, eat or drink 15 to 20 grams of fast-acting sugar to raise your blood sugar. Examples are:  · 3 to 4 glucose tablets. You can buy these at most drugstores.  · 4 ounces (1/2 cup) of regular (not diet) soft drinks  · 4 ounces (1/2 cup) of any fruit juice  · 8 ounces (1 cup) of milk  · 5 to 6 pieces of hard candy  · 1 tablespoon of honey  Check your blood sugar 15 minutes after treating yourself. If it is still below 70, take 15 to 20 more grams of fast-acting sugar. Test again in 15 minutes. If it returns to normal (70 or above), eat a snack or meal to keep your blood sugar in a safe range. If it stays low, call your doctor or go to an emergency room.  Follow-up care  Follow-up with your healthcare provider, or as advised. For more information about diabetes, visit the American Diabetes Association website at www.diabetes.org or call 416-613-9811.  When to seek medical advice  Call your healthcare provider right away if you have any of these symptoms of high blood sugar:  · Frequent urination  · Dizziness  · Drowsiness  · Thirst  · Headache  · Nausea or vomiting  · Abdominal pain  · Eyesight changes  · Fast  breathing  · Confusion or loss of consciousness  Also call your provider right away if you have any of these signs of low blood sugar:  · Fatigue  · Headache  · Shakes  · Excess sweating  · Hunger  · Feeling anxious or restless  · Eyesight changes  · Drowsiness  · Weakness  · Confusion or loss of consciousness  Call 911  Call for emergency help right away if any of these occur:  · Chest pain or shortness of breath  · Dizziness or fainting  · Weakness of an arm or leg or one side of the face  · Trouble speaking or seeing   Date Last Reviewed: 6/1/2016  © 4357-0041 [employer]. [address] [place] [address]. All rights reserved. This information is not intended as a substitute for professional medical care. Always follow your healthcare professional's instructions.            Walking for Fitness  Fitness walking has something for everyone, even people who are already fit. Walking is one of the safest ways to condition your body aerobically. It can boost energy, help you lose weight, and reduce stress.    Physical benefits  · Walking strengthens your heart and lungs, and tones your muscles.  · When walking, your feet land with less impact than in other sports. This reduces chances of muscle, bone, and joint injury.  · Regular walking improves your cholesterol levels and lowers your risk of heart disease. And it helps you control your blood sugar if you have diabetes.  · Walking is a weight-bearing activity, which helps maintain bone density. This can help prevent osteoporosis.  Personal rewards  · Taking walks can help you relax and manage stress. And fitness walking may make you feel better about yourself.  · Walking can help you sleep better at night and make you less likely to be depressed.  · Regular walking may help maintain your memory as you get older.  · Walking is a great way to spend extra time with friends and family members. Be sure to invite your dog along!  Q&A about fitness  walking  Q: Will walking keep me fit?  A: Yes. Regular walking at the right pace gives you all the benefits of other aerobic activities, such as jogging and swimming.  Q: Will walking help me lose weight and keep it off?  A: Yes. Per mile, walking can burn as many calories as jogging. Your health care provider can help work walking into your weight-loss plan.  Q: Is walking safe for my health?  A: Yes. Walking is safe if you have high blood pressure, diabetes, heart disease, or other conditions. Talk to your health care provider before you start.  Date Last Reviewed: 5/9/2015 © 2000-2016 [employer]. [address] [place] [address], [place] [address]. All rights reserved. This information is not intended as a substitute for professional medical care. Always follow your healthcare professional's instructions.            Weight Management: Getting Started  Healthy bodies come in all shapes and sizes. Not all bodies are made to be thin. For some people, a healthy weight is higher than the average weight listed on weight charts. Your healthcare provider can help you decide on a healthy weight for you.    Reasons to lose weight  Losing weight can help with some health problems, such as high blood pressure, heart disease, diabetes, sleep apnea, and arthritis. You may also feel more energy.  Set your long-term goal  Your goal doesn't even have to be a specific weight. You may decide on a fitness goal (such as being able to walk 10 miles a week), or a health goal (such as lowering your blood pressure). Choose a goal that is measurable and reasonable, so you know when you've reached it. A goal of reaching a BMI of less than 25 is not always reasonable (or possible).   Make an action plan  Habits dont change overnight. Setting your goals too high can leave you feeling discouraged if you cant reach them. Be realistic. Choose one or two small changes you can make now. Set an action plan for how you are going to make  these changes. When you can stick to this plan, keep making a few more small changes. Taking small steps will help you stay on the path to success.  Track your progress  Write down your goals. Then, keep a daily record of your progress. Write down what you eat and how active you are. This record lets you look back on how much youve done. It may also help when youre feeling frustrated. Reward yourself for success. Even if you dont reach every goal, give yourself credit for what you do get done.  Get support  Encouragement from others can help make losing weight easier. Ask your family members and friends for support. They may even want to join you. Also look to your healthcare provider, registered dietitian, and  for help. Your local hospital can give you more information about nutrition, exercise, and weight loss.  Date Last Reviewed: 1/31/2016  © 9626-4187 [employer] [address] [place] [address]. All rights reserved. This information is not intended as a substitute for professional medical care. Always follow your healthcare professional's instructions.            Established High Blood Pressure    High blood pressure (hypertension) is a chronic disease. Often health care providers dont know what causes it. But it can be caused by certain health conditions and medicines.  If you have high blood pressure, you may not have any symptoms. If you do have symptoms, they may include headache, dizziness, changes in your vision, chest pain, and shortness of breath. But even without symptoms, high blood pressure thats not treated raises your risk for heart attack and stroke. High blood pressure is a serious health risk and shouldnt be ignored.  A blood pressure reading is made up of two numbers: a higher number over a lower number. The top number is the systolic pressure. The bottom number is the diastolic pressure. A normal blood pressure is less than 120 over less than  80.  High blood pressure is when either the top number is 140 or higher, or the bottom number is 90 or higher. This must be the result when taking your blood pressure a number of times. The blood pressures between normal and high are called prehypertension.  Home care  If you have high blood pressure, you should do what is listed below to lower your blood pressure. If you are taking medicines for high blood pressure, these methods may reduce or end your need for medicines in the future.  · Begin a weight-loss program if you are overweight.  · Cut back on how much salt you get in your diet. Heres how to do this:  ¨ Dont eat foods that have a lot of salt. These include olives, pickles, smoked meats, and salted potato chips.  ¨ Dont add salt to your food at the table.  ¨ Use only small amounts of salt when cooking.  · Begin an exercise program. Talk with your health care provider about the type of exercise program that would be best for you. It doesn't have to be hard. Even brisk walking for 20 minutes 3 times a week is a good form of exercise.  · Dont take medicines that have heart stimulants. This includes many cold and sinus decongestant pills and sprays, as well as diet pills. Check the warnings about hypertension on the label. Stimulants such as amphetamine or cocaine could be lethal for someone with high blood pressure. Never take these.  · Limit how much caffeine you get in your diet. Switch to caffeine-free products.  · Stop smoking. If you are a long-time smoker, this can be hard. Enroll in a stop-smoking program to make it more likely that you will quit for good.  · Learn how to handle stress. This is an important part of any program to lower blood pressure. Learn about relaxation methods like meditation, yoga, or biofeedback.  · If your provider prescribed medicines, take them exactly as directed. Missing doses may cause your blood pressure get out of control.  · Consider buying an automatic blood  pressure machine. You can get one of these at most pharmacies. Use this to watch your blood pressure at home. Give the results to your provider.  Follow-up care  You will need to make regular visits to your health care provider. This is to check your blood pressure and to make changes to your medicines. Make a follow-up appointment as directed.  When to seek medical advice  Call your health care provider right away if any of these occur:  · Chest pain or shortness of breath  · Severe headache  · Throbbing or rushing sound in the ears  · Nosebleed  · Sudden severe pain in your belly (abdomen)  · Extreme drowsiness, confusion, or fainting  · Dizziness or dizziness with a spinning sensation (vertigo)  · Weakness of an arm or leg or one side of the face  · You have problems speaking or seeing   Date Last Reviewed: 11/25/2014  © 3826-5622 [employer]. [address] [place] [address]. All rights reserved. This information is not intended as a substitute for professional medical care. Always follow your healthcare professional's instructions.               Smoking Cessation     If you would like to quit smoking:   You may be eligible for free services if you are a [place] resident and started smoking cigarettes before September 1, 1988.  Call the Smoking Cessation Trust ([facility]) toll free at (954) 255-2649 or (697) 495-5331.   Call 1-800-QUIT-NOW if you do not meet the above criteria.            Language Assistance Services     ATTENTION: Language assistance services are available, free of charge. Please call [phone].      ATENCIÓN: Si habla español, tiene a dimas disposición servicios gratuitos de asistencia lingüística. Llcindy al [phone].     [facility] Ý: N?u b?n nói Ti?ng Vi?t, có các d?ch v? h? tr? ngôn ng? mi?n phí dành cho b?n. G?i s? [phone].         [facility] complies with applicable Federal civil rights laws and does not discriminate on the basis of race, color,  national origin, age, disability, or sex.

## 2017-03-30 ENCOUNTER — OUTPATIENT CASE MANAGEMENT (OUTPATIENT)
Dept: ADMINISTRATIVE | Facility: OTHER | Age: 67
End: 2017-03-30

## 2017-03-30 ENCOUNTER — TELEPHONE (OUTPATIENT)
Dept: FAMILY MEDICINE | Facility: CLINIC | Age: 67
End: 2017-03-30

## 2017-03-30 NOTE — TELEPHONE ENCOUNTER
Patient states they went to [facility] on [address]. They do not carry this medication.  Patient is requesting a hard copy to take to a pharmacy.

## 2017-03-30 NOTE — TELEPHONE ENCOUNTER
Received message from patient's daughter (Maye Tirado) stating patient's pharmacy did not have Oxycodone available and would have to order this medication. Mrs. Villavicencio requested on yesterday to have prescription sent to [facility] on [address]. Upon further inspection this medication was canceled at original pharmacy and sent to [facility] on [address]. Pharmacy confirmed receipt on 3-29-17 at 6:22pm. Mrs. Villavicencio notified of above.

## 2017-03-30 NOTE — TELEPHONE ENCOUNTER
----- Message from Chelsea Olivo sent at 3/30/2017 10:59 AM CDT -----  Daughter/Maye [phone] is calling to speak to Kimberlyn because she needs a hard copy of the pain medication for patient. The pharmacies do not carry it. Please call back for details.

## 2017-03-30 NOTE — TELEPHONE ENCOUNTER
----- Message from Kathleen Pickens sent at 3/30/2017  9:25 AM CDT -----  Contact: [phone]  Patient is requesting a call back from the nurse stated pain meds need to be sent to another pharmacy the original pharmacy is out.  Need a hard copy and patient can take it to another pharmacy.    Please call the patient upon request at phone number [phone].

## 2017-03-31 ENCOUNTER — TELEPHONE (OUTPATIENT)
Dept: FAMILY MEDICINE | Facility: CLINIC | Age: 67
End: 2017-03-31

## 2017-03-31 DIAGNOSIS — L98.491 NONHEALING SKIN ULCER, LIMITED TO BREAKDOWN OF SKIN: ICD-10-CM

## 2017-03-31 RX ORDER — OXYCODONE HYDROCHLORIDE 10 MG/1
10 TABLET ORAL EVERY 12 HOURS PRN
Qty: 60 TABLET | Refills: 0 | Status: SHIPPED | OUTPATIENT
Start: 2017-03-31 | End: 2017-04-12 | Stop reason: ALTCHOICE

## 2017-03-31 NOTE — TELEPHONE ENCOUNTER
Explained info on daughter being made personal care attendant and order for hospital bed being sent,

## 2017-03-31 NOTE — TELEPHONE ENCOUNTER
Please call patient and advise her that the outpatient case management team will be working to see if they can make her daughter a personal care attendant and help her to complete the necessary paper work. An order for a hospital bed has also been sent.

## 2017-04-02 DIAGNOSIS — L89.93 DECUBITUS ULCER, INFECTED, STAGE III: ICD-10-CM

## 2017-04-02 DIAGNOSIS — L08.9 DECUBITUS ULCER, INFECTED, STAGE III: ICD-10-CM

## 2017-04-02 RX ORDER — COLLAGENASE SANTYL 250 [ARB'U]/G
OINTMENT TOPICAL
Qty: 90 G | Refills: 2 | Status: SHIPPED | OUTPATIENT
Start: 2017-04-02 | End: 2017-06-19 | Stop reason: ALTCHOICE

## 2017-04-03 ENCOUNTER — TELEPHONE (OUTPATIENT)
Dept: FAMILY MEDICINE | Facility: CLINIC | Age: 67
End: 2017-04-03

## 2017-04-03 NOTE — TELEPHONE ENCOUNTER
----- Message from Jannie Hansen sent at 4/3/2017  9:29 AM CDT -----  Contact: self  Patient called regarding refill of medication. Please contact [phone] (home)     SANTYL ointment    [facility] [address]  [place] - [address]  [place] [address]  Phone: [phone] Fax: [phone]

## 2017-04-03 NOTE — TELEPHONE ENCOUNTER
Patient requesting a refill of Santyl Ointment. Upon further inspection this medication was refilled on 4-2-17. Call placed to patient notifying her of above. Verbalized understanding.

## 2017-04-05 ENCOUNTER — TELEPHONE (OUTPATIENT)
Dept: FAMILY MEDICINE | Facility: CLINIC | Age: 67
End: 2017-04-05

## 2017-04-05 ENCOUNTER — ANTI-COAG VISIT (OUTPATIENT)
Dept: CARDIOLOGY | Facility: CLINIC | Age: 67
End: 2017-04-05

## 2017-04-05 ENCOUNTER — HOSPITAL ENCOUNTER (INPATIENT)
Facility: HOSPITAL | Age: 67
LOS: 6 days | Discharge: HOME HEALTH | DRG: 853 | End: 2017-04-11
Attending: EMERGENCY MEDICINE | Admitting: HOSPITALIST
Payer: MEDICARE

## 2017-04-05 DIAGNOSIS — A41.9 SEPSIS, DUE TO UNSPECIFIED ORGANISM: Primary | ICD-10-CM

## 2017-04-05 DIAGNOSIS — Z79.01 LONG TERM CURRENT USE OF ANTICOAGULANT THERAPY: ICD-10-CM

## 2017-04-05 DIAGNOSIS — L03.311 CELLULITIS, ABDOMINAL WALL: ICD-10-CM

## 2017-04-05 DIAGNOSIS — J18.9 PNEUMONIA DUE TO INFECTIOUS ORGANISM, UNSPECIFIED LATERALITY, UNSPECIFIED PART OF LUNG: ICD-10-CM

## 2017-04-05 DIAGNOSIS — L08.9 INFECTED WOUND: ICD-10-CM

## 2017-04-05 DIAGNOSIS — T14.8XXA INFECTED WOUND: ICD-10-CM

## 2017-04-05 PROBLEM — E83.42 HYPOMAGNESEMIA: Status: ACTIVE | Noted: 2017-04-05

## 2017-04-05 PROBLEM — J44.9 COPD (CHRONIC OBSTRUCTIVE PULMONARY DISEASE): Status: ACTIVE | Noted: 2017-04-05

## 2017-04-05 LAB
ALBUMIN SERPL BCP-MCNC: 3 G/DL
ALP SERPL-CCNC: 88 U/L
ALT SERPL W/O P-5'-P-CCNC: 11 U/L
ANION GAP SERPL CALC-SCNC: 11 MMOL/L
APTT BLDCRRT: 39.2 SEC
AST SERPL-CCNC: 19 U/L
BASOPHILS # BLD AUTO: 0 K/UL
BASOPHILS NFR BLD: 0.2 %
BILIRUB SERPL-MCNC: 0.6 MG/DL
BUN SERPL-MCNC: 10 MG/DL
CALCIUM SERPL-MCNC: 9.2 MG/DL
CHLORIDE SERPL-SCNC: 97 MMOL/L
CO2 SERPL-SCNC: 30 MMOL/L
CREAT SERPL-MCNC: 0.7 MG/DL
DIFFERENTIAL METHOD: ABNORMAL
EOSINOPHIL # BLD AUTO: 0 K/UL
EOSINOPHIL NFR BLD: 0.2 %
ERYTHROCYTE [DISTWIDTH] IN BLOOD BY AUTOMATED COUNT: 16.5 %
EST. GFR  (AFRICAN AMERICAN): >60 ML/MIN/1.73 M^2
EST. GFR  (NON AFRICAN AMERICAN): >60 ML/MIN/1.73 M^2
GLUCOSE SERPL-MCNC: 112 MG/DL
HCT VFR BLD AUTO: 37 %
HGB BLD-MCNC: 11.4 G/DL
INR PPP: 2.2
INR PPP: 2.4
LACTATE SERPL-SCNC: 1.3 MMOL/L
LACTATE SERPL-SCNC: 1.7 MMOL/L
LYMPHOCYTES # BLD AUTO: 1.1 K/UL
LYMPHOCYTES NFR BLD: 6.6 %
MAGNESIUM SERPL-MCNC: 1.3 MG/DL
MCH RBC QN AUTO: 24.7 PG
MCHC RBC AUTO-ENTMCNC: 30.9 %
MCV RBC AUTO: 80 FL
MONOCYTES # BLD AUTO: 1 K/UL
MONOCYTES NFR BLD: 6.4 %
NEUTROPHILS # BLD AUTO: 14.1 K/UL
NEUTROPHILS NFR BLD: 86.6 %
PHOSPHATE SERPL-MCNC: 1.4 MG/DL
PLATELET # BLD AUTO: 324 K/UL
PMV BLD AUTO: 8.3 FL
POCT GLUCOSE: 108 MG/DL (ref 70–110)
POTASSIUM SERPL-SCNC: 4.3 MMOL/L
PROT SERPL-MCNC: 7.6 G/DL
PROTHROMBIN TIME: 22.7 SEC
RBC # BLD AUTO: 4.63 M/UL
SODIUM SERPL-SCNC: 138 MMOL/L
TROPONIN I SERPL DL<=0.01 NG/ML-MCNC: 0.01 NG/ML
WBC # BLD AUTO: 16.3 K/UL

## 2017-04-05 PROCEDURE — 83036 HEMOGLOBIN GLYCOSYLATED A1C: CPT

## 2017-04-05 PROCEDURE — 84100 ASSAY OF PHOSPHORUS: CPT

## 2017-04-05 PROCEDURE — 83605 ASSAY OF LACTIC ACID: CPT

## 2017-04-05 PROCEDURE — 94640 AIRWAY INHALATION TREATMENT: CPT

## 2017-04-05 PROCEDURE — 63600175 PHARM REV CODE 636 W HCPCS: Performed by: NURSE PRACTITIONER

## 2017-04-05 PROCEDURE — 63600175 PHARM REV CODE 636 W HCPCS: Performed by: EMERGENCY MEDICINE

## 2017-04-05 PROCEDURE — 96368 THER/DIAG CONCURRENT INF: CPT

## 2017-04-05 PROCEDURE — 25000003 PHARM REV CODE 250: Performed by: HOSPITALIST

## 2017-04-05 PROCEDURE — 85610 PROTHROMBIN TIME: CPT

## 2017-04-05 PROCEDURE — 25000003 PHARM REV CODE 250: Performed by: NURSE PRACTITIONER

## 2017-04-05 PROCEDURE — 85025 COMPLETE CBC W/AUTO DIFF WBC: CPT

## 2017-04-05 PROCEDURE — 36415 COLL VENOUS BLD VENIPUNCTURE: CPT

## 2017-04-05 PROCEDURE — 83735 ASSAY OF MAGNESIUM: CPT

## 2017-04-05 PROCEDURE — 80053 COMPREHEN METABOLIC PANEL: CPT

## 2017-04-05 PROCEDURE — 27000221 HC OXYGEN, UP TO 24 HOURS

## 2017-04-05 PROCEDURE — 99285 EMERGENCY DEPT VISIT HI MDM: CPT | Mod: 25

## 2017-04-05 PROCEDURE — 25000242 PHARM REV CODE 250 ALT 637 W/ HCPCS: Performed by: NURSE PRACTITIONER

## 2017-04-05 PROCEDURE — 96367 TX/PROPH/DG ADDL SEQ IV INF: CPT

## 2017-04-05 PROCEDURE — 85730 THROMBOPLASTIN TIME PARTIAL: CPT

## 2017-04-05 PROCEDURE — 96365 THER/PROPH/DIAG IV INF INIT: CPT

## 2017-04-05 PROCEDURE — 25000003 PHARM REV CODE 250: Performed by: EMERGENCY MEDICINE

## 2017-04-05 PROCEDURE — 94760 N-INVAS EAR/PLS OXIMETRY 1: CPT

## 2017-04-05 PROCEDURE — 84484 ASSAY OF TROPONIN QUANT: CPT

## 2017-04-05 PROCEDURE — 87040 BLOOD CULTURE FOR BACTERIA: CPT

## 2017-04-05 PROCEDURE — 11000001 HC ACUTE MED/SURG PRIVATE ROOM

## 2017-04-05 PROCEDURE — 96366 THER/PROPH/DIAG IV INF ADDON: CPT

## 2017-04-05 PROCEDURE — 93005 ELECTROCARDIOGRAM TRACING: CPT

## 2017-04-05 PROCEDURE — 63600175 PHARM REV CODE 636 W HCPCS: Performed by: HOSPITALIST

## 2017-04-05 RX ORDER — TIOTROPIUM BROMIDE 18 UG/1
1 CAPSULE ORAL; RESPIRATORY (INHALATION) DAILY
Status: DISCONTINUED | OUTPATIENT
Start: 2017-04-06 | End: 2017-04-05 | Stop reason: SDUPTHER

## 2017-04-05 RX ORDER — BUDESONIDE 0.5 MG/2ML
0.5 INHALANT ORAL EVERY 12 HOURS
Status: DISCONTINUED | OUTPATIENT
Start: 2017-04-06 | End: 2017-04-05 | Stop reason: SDUPTHER

## 2017-04-05 RX ORDER — ONDANSETRON 2 MG/ML
4 INJECTION INTRAMUSCULAR; INTRAVENOUS EVERY 8 HOURS PRN
Status: DISCONTINUED | OUTPATIENT
Start: 2017-04-05 | End: 2017-04-11 | Stop reason: HOSPADM

## 2017-04-05 RX ORDER — ESCITALOPRAM OXALATE 10 MG/1
10 TABLET ORAL DAILY
Status: DISCONTINUED | OUTPATIENT
Start: 2017-04-06 | End: 2017-04-11 | Stop reason: HOSPADM

## 2017-04-05 RX ORDER — POTASSIUM CHLORIDE 20 MEQ/1
20 TABLET, EXTENDED RELEASE ORAL DAILY
Status: DISCONTINUED | OUTPATIENT
Start: 2017-04-06 | End: 2017-04-11 | Stop reason: HOSPADM

## 2017-04-05 RX ORDER — AMOXICILLIN 250 MG
1 CAPSULE ORAL 2 TIMES DAILY PRN
Status: DISCONTINUED | OUTPATIENT
Start: 2017-04-05 | End: 2017-04-11 | Stop reason: HOSPADM

## 2017-04-05 RX ORDER — INSULIN ASPART 100 [IU]/ML
0-5 INJECTION, SOLUTION INTRAVENOUS; SUBCUTANEOUS
Status: DISCONTINUED | OUTPATIENT
Start: 2017-04-05 | End: 2017-04-11 | Stop reason: HOSPADM

## 2017-04-05 RX ORDER — OXYCODONE HYDROCHLORIDE 5 MG/1
10 TABLET ORAL EVERY 12 HOURS PRN
Status: DISCONTINUED | OUTPATIENT
Start: 2017-04-05 | End: 2017-04-11 | Stop reason: HOSPADM

## 2017-04-05 RX ORDER — ACETAMINOPHEN 325 MG/1
650 TABLET ORAL EVERY 6 HOURS PRN
Status: DISCONTINUED | OUTPATIENT
Start: 2017-04-05 | End: 2017-04-11 | Stop reason: HOSPADM

## 2017-04-05 RX ORDER — POLYETHYLENE GLYCOL 3350 17 G/17G
17 POWDER, FOR SOLUTION ORAL DAILY
Status: DISCONTINUED | OUTPATIENT
Start: 2017-04-06 | End: 2017-04-10

## 2017-04-05 RX ORDER — ASCORBIC ACID 500 MG
500 TABLET ORAL NIGHTLY
Status: DISCONTINUED | OUTPATIENT
Start: 2017-04-05 | End: 2017-04-11 | Stop reason: HOSPADM

## 2017-04-05 RX ORDER — CLONAZEPAM 1 MG/1
1 TABLET ORAL 2 TIMES DAILY PRN
Status: DISCONTINUED | OUTPATIENT
Start: 2017-04-05 | End: 2017-04-11 | Stop reason: HOSPADM

## 2017-04-05 RX ORDER — LANOLIN ALCOHOL/MO/W.PET/CERES
400 CREAM (GRAM) TOPICAL 2 TIMES DAILY
Status: DISCONTINUED | OUTPATIENT
Start: 2017-04-05 | End: 2017-04-11 | Stop reason: HOSPADM

## 2017-04-05 RX ORDER — FUROSEMIDE 40 MG/1
40 TABLET ORAL 2 TIMES DAILY
Status: DISCONTINUED | OUTPATIENT
Start: 2017-04-05 | End: 2017-04-11 | Stop reason: HOSPADM

## 2017-04-05 RX ORDER — IPRATROPIUM BROMIDE AND ALBUTEROL SULFATE 2.5; .5 MG/3ML; MG/3ML
3 SOLUTION RESPIRATORY (INHALATION) EVERY 4 HOURS
Status: DISCONTINUED | OUTPATIENT
Start: 2017-04-05 | End: 2017-04-11 | Stop reason: HOSPADM

## 2017-04-05 RX ORDER — IBUPROFEN 200 MG
24 TABLET ORAL
Status: DISCONTINUED | OUTPATIENT
Start: 2017-04-05 | End: 2017-04-11 | Stop reason: HOSPADM

## 2017-04-05 RX ORDER — PANTOPRAZOLE SODIUM 40 MG/1
40 TABLET, DELAYED RELEASE ORAL DAILY
Status: DISCONTINUED | OUTPATIENT
Start: 2017-04-06 | End: 2017-04-11 | Stop reason: HOSPADM

## 2017-04-05 RX ORDER — GABAPENTIN 300 MG/1
600 CAPSULE ORAL 3 TIMES DAILY
Status: DISCONTINUED | OUTPATIENT
Start: 2017-04-05 | End: 2017-04-06

## 2017-04-05 RX ORDER — FUROSEMIDE 10 MG/ML
20 INJECTION INTRAMUSCULAR; INTRAVENOUS ONCE
Status: DISCONTINUED | OUTPATIENT
Start: 2017-04-05 | End: 2017-04-11 | Stop reason: HOSPADM

## 2017-04-05 RX ORDER — GLUCAGON 1 MG
1 KIT INJECTION
Status: DISCONTINUED | OUTPATIENT
Start: 2017-04-05 | End: 2017-04-11 | Stop reason: HOSPADM

## 2017-04-05 RX ORDER — HYDROCODONE BITARTRATE AND ACETAMINOPHEN 10; 325 MG/1; MG/1
TABLET ORAL
Status: COMPLETED
Start: 2017-04-05 | End: 2017-04-05

## 2017-04-05 RX ORDER — METHOCARBAMOL 500 MG/1
500 TABLET, FILM COATED ORAL 4 TIMES DAILY PRN
Status: DISCONTINUED | OUTPATIENT
Start: 2017-04-05 | End: 2017-04-11 | Stop reason: HOSPADM

## 2017-04-05 RX ORDER — IBUPROFEN 200 MG
16 TABLET ORAL
Status: DISCONTINUED | OUTPATIENT
Start: 2017-04-05 | End: 2017-04-11 | Stop reason: HOSPADM

## 2017-04-05 RX ORDER — METFORMIN HYDROCHLORIDE 500 MG/1
500 TABLET ORAL 2 TIMES DAILY WITH MEALS
Status: DISCONTINUED | OUTPATIENT
Start: 2017-04-05 | End: 2017-04-11 | Stop reason: HOSPADM

## 2017-04-05 RX ORDER — CIPROFLOXACIN 2 MG/ML
400 INJECTION, SOLUTION INTRAVENOUS
Status: COMPLETED | OUTPATIENT
Start: 2017-04-05 | End: 2017-04-05

## 2017-04-05 RX ORDER — HYDROCODONE BITARTRATE AND ACETAMINOPHEN 10; 325 MG/1; MG/1
1 TABLET ORAL EVERY 6 HOURS PRN
Status: DISCONTINUED | OUTPATIENT
Start: 2017-04-05 | End: 2017-04-11 | Stop reason: HOSPADM

## 2017-04-05 RX ORDER — ACETAMINOPHEN 500 MG
1000 TABLET ORAL
Status: COMPLETED | OUTPATIENT
Start: 2017-04-05 | End: 2017-04-05

## 2017-04-05 RX ORDER — ATORVASTATIN CALCIUM 20 MG/1
20 TABLET, FILM COATED ORAL DAILY
Status: DISCONTINUED | OUTPATIENT
Start: 2017-04-06 | End: 2017-04-11 | Stop reason: HOSPADM

## 2017-04-05 RX ORDER — ADHESIVE BANDAGE
30 BANDAGE TOPICAL DAILY PRN
Status: DISCONTINUED | OUTPATIENT
Start: 2017-04-05 | End: 2017-04-09

## 2017-04-05 RX ORDER — BUDESONIDE 0.5 MG/2ML
0.5 INHALANT ORAL EVERY 12 HOURS
Status: DISCONTINUED | OUTPATIENT
Start: 2017-04-06 | End: 2017-04-11 | Stop reason: HOSPADM

## 2017-04-05 RX ORDER — RAMELTEON 8 MG/1
8 TABLET ORAL NIGHTLY PRN
Status: DISCONTINUED | OUTPATIENT
Start: 2017-04-05 | End: 2017-04-11 | Stop reason: HOSPADM

## 2017-04-05 RX ADMIN — ACETAMINOPHEN 1000 MG: 500 TABLET, FILM COATED ORAL at 03:04

## 2017-04-05 RX ADMIN — MAGNESIUM SULFATE HEPTAHYDRATE 3 G: 500 INJECTION, SOLUTION INTRAMUSCULAR; INTRAVENOUS at 10:04

## 2017-04-05 RX ADMIN — GABAPENTIN 600 MG: 300 CAPSULE ORAL at 10:04

## 2017-04-05 RX ADMIN — CIPROFLOXACIN 400 MG: 2 INJECTION, SOLUTION INTRAVENOUS at 04:04

## 2017-04-05 RX ADMIN — OXYCODONE HYDROCHLORIDE AND ACETAMINOPHEN 500 MG: 500 TABLET ORAL at 10:04

## 2017-04-05 RX ADMIN — IPRATROPIUM BROMIDE AND ALBUTEROL SULFATE 3 ML: .5; 3 SOLUTION RESPIRATORY (INHALATION) at 11:04

## 2017-04-05 RX ADMIN — SODIUM CHLORIDE 1000 ML: 0.9 INJECTION, SOLUTION INTRAVENOUS at 03:04

## 2017-04-05 RX ADMIN — RAMELTEON 8 MG: 8 TABLET, FILM COATED ORAL at 10:04

## 2017-04-05 RX ADMIN — FUROSEMIDE 40 MG: 40 TABLET ORAL at 10:04

## 2017-04-05 RX ADMIN — Medication 400 MG: at 10:04

## 2017-04-05 RX ADMIN — PIPERACILLIN AND TAZOBACTAM 4.5 G: 4; .5 INJECTION, POWDER, LYOPHILIZED, FOR SOLUTION INTRAVENOUS; PARENTERAL at 03:04

## 2017-04-05 RX ADMIN — DEXTROSE MONOHYDRATE 2000 MG: 5 INJECTION, SOLUTION INTRAVENOUS at 04:04

## 2017-04-05 RX ADMIN — HYDROCODONE BITARTRATE AND ACETAMINOPHEN 1 TABLET: 10; 325 TABLET ORAL at 06:04

## 2017-04-05 RX ADMIN — CLONAZEPAM 1 MG: 1 TABLET ORAL at 10:04

## 2017-04-05 NOTE — ED PROVIDER NOTES
Encounter Date: 2017    SCRIBE #1 NOTE: I, Zhao Saldaña, am scribing for, and in the presence of,  Dr. Ridley. I have scribed the entire note.       History     Chief Complaint   Patient presents with    Shortness of Breath     Worsening SOB since yesterday.  construction happening around home, possibly exacerbating COPD.    Fever     Currently being treated by wound care for wound to left abdomen.  Sent by  nurse.      Review of patient's allergies indicates:   Allergen Reactions    Dilaudid [hydromorphone] Anaphylaxis     Other reaction(s): Anaphylaxis  Other reaction(s): Unknown     HPI Comments: 2017  2:56 PM     Chief Complaint: fever      The patient is a 66 y.o. female with hx of a chronic left abdominal wound, who is presenting with acute onset fever and SOB and difficulty breathing which began today. Pt was sent to ER by home health nurse. She reports she does not feel well, and has no other associated complaints. She was recently hospitalized for her left sided abdominal wound and finished a round of antibiotics. Pt reports her granddaughter was sick with a cough recently. There are no other complaints at this time. She has a past medical history of *Atrial flutter; Anxiety; Arthritis; Asthma; Atrial fibrillation; Back pain; Cataract; CHF (congestive heart failure); COPD (chronic obstructive pulmonary disease); Depression; Diabetes mellitus; Emphysema of lung; Heart failure; Hernia; Hypercapnic respiratory failure, chronic (2016); Hyperlipidemia; Hypertension; Iron deficiency anemia (2/3/2016); Myocardial infarction; Obesity; Peripheral vascular disease; Pneumonia; Polyneuropathy; Retinal detachment; Skin ulcer (3/18/2017); Tobacco dependence; and Type II 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, not stated as uncontrolled. She has a past surgical history that includes  section; Oophorectomy; Retinal detachment surgery; Cataract extraction (Left);  Eye surgery; Hysterectomy; and Tonsillectomy.              The history is provided by the patient.     Past Medical History:   Diagnosis Date    *Atrial flutter     Anxiety     Arthritis     Asthma     Atrial fibrillation     Back pain     Cataract     OD    CHF (congestive heart failure)     COPD (chronic obstructive pulmonary disease)     Depression     Diabetes mellitus     Emphysema of lung     Heart failure     Hernia     Hypercapnic respiratory failure, chronic 2016    Hyperlipidemia     Hypertension     Iron deficiency anemia 2/3/2016    Myocardial infarction     Obesity     Peripheral vascular disease     Pneumonia     Polyneuropathy     Retinal detachment     OS    Skin ulcer 3/18/2017    Tobacco dependence     Type II 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, not stated as uncontrolled      Past Surgical History:   Procedure Laterality Date    CATARACT EXTRACTION Left     OS      SECTION      x2    EYE SURGERY      HYSTERECTOMY      OOPHORECTOMY      one ovary conserved    RETINAL DETACHMENT SURGERY      buckle --OS    TONSILLECTOMY       Family History   Problem Relation Age of Onset    Arthritis Father     Heart disease Father     Early death Sister 30     heart disease    Heart disease Sister 32     MI    No Known Problems Brother     No Known Problems Daughter     Blindness Son      due to accident//    Arthritis Sister     Heart disease Sister     Crohn's disease Sister     Cancer Brother      asbestosis    Alcohol abuse Mother     Breast cancer Neg Hx     Glaucoma Neg Hx     Macular degeneration Neg Hx     Retinal detachment Neg Hx     Amblyopia Neg Hx     Diabetes Neg Hx     Cataracts Neg Hx     Hypertension Neg Hx     Strabismus Neg Hx     Stroke Neg Hx     Thyroid disease Neg Hx      Social History   Substance Use Topics    Smoking status: Current Some Day Smoker     Packs/day: 0.25     Years:  50.00     Types: Cigarettes     Start date: 1/19/1968     Last attempt to quit: 9/19/2015    Smokeless tobacco: Former User     Quit date: 2/12/2017      Comment: 1 ppd for 20 years    Alcohol use No     Review of Systems   Constitutional: Positive for fever.   HENT: Negative for sore throat.    Eyes: Negative for visual disturbance.   Respiratory: Positive for cough. Negative for shortness of breath.    Cardiovascular: Negative for chest pain.   Gastrointestinal: Negative for nausea.   Genitourinary: Negative for dysuria.   Musculoskeletal: Negative for back pain.   Skin: Positive for color change. Negative for rash.   Neurological: Negative for weakness.   Hematological: Does not bruise/bleed easily.   Psychiatric/Behavioral: Negative for confusion.       Physical Exam   Initial Vitals   BP Pulse Resp Temp SpO2   -- 04/05/17 1431 04/05/17 1431 04/05/17 1431 04/05/17 1431    121 24 103.9 °F (39.9 °C) 87 %     Physical Exam    Nursing note and vitals reviewed.  Constitutional:   Pt is teary.   HENT:   Head: Normocephalic and atraumatic.   Eyes: Conjunctivae are normal.   Cardiovascular: Regular rhythm, normal heart sounds and intact distal pulses. Exam reveals no gallop and no friction rub.    No murmur heard.  tachycardic   Pulmonary/Chest: Tachypnea noted. No respiratory distress. She has no wheezes. She has no rhonchi. She has no rales. She exhibits no tenderness.   distant breath sounds, poor air movement.    Abdominal: Soft. Bowel sounds are normal. She exhibits no distension and no mass. There is no tenderness. There is no rebound and no guarding.   Musculoskeletal: She exhibits no edema.   Neurological: She is alert and oriented to person, place, and time. She has normal strength. No cranial nerve deficit or sensory deficit.   Skin: No abscess noted. There is erythema.   Stage 3 abdominal ulcer with mild foul odor and yellowish purulence, with significant erythema and tenderness to palptaion. No fluctuance  or induration. See picture.    Psychiatric: She has a normal mood and affect.             ED Course   Procedures  Labs Reviewed   COMPREHENSIVE METABOLIC PANEL - Abnormal; Notable for the following:        Result Value    CO2 30 (*)     Glucose 112 (*)     Albumin 3.0 (*)     All other components within normal limits   MAGNESIUM - Abnormal; Notable for the following:     Magnesium 1.3 (*)     All other components within normal limits   PHOSPHORUS - Abnormal; Notable for the following:     Phosphorus 1.4 (*)     All other components within normal limits   CBC W/ AUTO DIFFERENTIAL - Abnormal; Notable for the following:     WBC 16.30 (*)     Hemoglobin 11.4 (*)     MCV 80 (*)     MCH 24.7 (*)     MCHC 30.9 (*)     RDW 16.5 (*)     MPV 8.3 (*)     Gran # 14.1 (*)     Gran% 86.6 (*)     Lymph% 6.6 (*)     All other components within normal limits   PROTIME-INR - Abnormal; Notable for the following:     Prothrombin Time 22.7 (*)     INR 2.2 (*)     All other components within normal limits   APTT - Abnormal; Notable for the following:     aPTT 39.2 (*)     All other components within normal limits   CULTURE, URINE   LACTIC ACID, PLASMA   TROPONIN I   LACTIC ACID, PLASMA   URINALYSIS     EKG Readings: (Independently Interpreted)   Atrial fibrillation with RVR, 106 bpm, low voltage QRS, normal ST segments, normal T waves.       X-Rays:   Independently Interpreted Readings:   Chest X-Ray: Increased interstitial opacities concerning for pneumonia vs edema     Medical Decision Making:   History:   Old Medical Records: I decided to obtain old medical records.  Critical Care Time MDM    The high probability of sudden, clinically significant deterioration in the patient's condition required the highest level of my preparedness to intervene urgently.    The services I provided to this patient were to treat and/or prevent clinically significant deterioration that could result in permanent disability, chronic pain and/or death.      Services included the following: chart data review, reviewing nursing notes and/or old charts, documentation time, consultant collaboration regarding findings and treatment options, medication orders and management, direct patient care, vital sign assessments and ordering, interpreting and reviewing diagnostic studies/lab tests.    Aggregate critical care time was between 30 and 74 minutes, which includes only time during which I was engaged in work directly related to the patient's care, as described above, whether at the bedside or elsewhere in the Emergency Department.     Lex Ridley M.D.                 Scribe Attestation:   Scribe #1: I performed the above scribed service and the documentation accurately describes the services I performed. I attest to the accuracy of the note.    Attending Attestation:           Physician Attestation for Scribe:  Physician Attestation Statement for Scribe #1: I, Dr. Ridley, reviewed documentation, as scribed by Zhao Saldaña in my presence, and it is both accurate and complete.           Nelly Tian is a 66 y.o. female who presents to the Emergency Department       This patient does have evidence of infective focus  My overall impression is sepsis.  Antibiotics given-   Antibiotics     Start     Stop Route Frequency Ordered    04/06/17 0000  piperacillin-tazobactam 4.5 g in dextrose 5 % 100 mL IVPB (ready to mix system)      -- IV Every 8 hours (non-standard times) 04/05/17 2016          Recent Labs  Lab 04/05/17  1500   BILITOT 0.6       Recent Labs  Lab 04/05/17  1500 04/05/17  1904   LACTATE 1.7 1.3        PT with significant abdominal wall cellulitis and possible HCAP. Started on Vanc, Zosyn Cipro. Will admit to hspitalist who I discuscussed with for IV abx.              ED Course     Clinical Impression:   The primary encounter diagnosis was Sepsis, due to unspecified organism. Diagnoses of Cellulitis, abdominal wall and Pneumonia due to infectious  organism, unspecified laterality, unspecified part of lung were also pertinent to this visit.          Lex Ridley MD  04/05/17 2050

## 2017-04-05 NOTE — H&P
[facility] Medicine  History & Physical    Patient Name: Nelly Tian  MRN: [id number]  Admission Date: 2017  Attending Physician: Lex Ridley MD   Primary Care Provider: Constantine Bird MD         Patient information was obtained from patient and ER records.     Subjective:     Principal Problem:Sepsis    Chief Complaint:   Chief Complaint   Patient presents with    Shortness of Breath     Worsening SOB since yesterday.  construction happening around home, possibly exacerbating COPD.    Fever     Currently being treated by wound care for wound to left abdomen.  Sent by  nurse.         HPI: The patient is a 66 y.o. female with hx of a chronic left abdominal wound, who is presenting with acute onset fever of 103 PTA  and dyspnea which began today. Pt was sent to ER by [facility] nurse. Pt reports she does not feel well, and has no other associated complaints. She was recently hospitalized for her left sided abdominal wound and finished a round of Augmentin. Pt reports her granddaughter was sick with a cough recently. There are no other complaints at this time. She has a past medical history of *Atrial flutter; Anxiety; Arthritis; Asthma; Atrial fibrillation; Back pain; Cataract; CHF (congestive heart failure); COPD (chronic obstructive pulmonary disease); Depression; Diabetes mellitus; Emphysema of lung; Heart failure; Hernia; Hypercapnic respiratory failure, chronic (2016); Hyperlipidemia; Hypertension; Iron deficiency anemia (2/3/2016); Myocardial infarction; Obesity; Peripheral vascular disease; Pneumonia; Polyneuropathy; Retinal detachment; Skin ulcer (3/18/2017); Tobacco dependence; and Type II 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, not stated as uncontrolled. She has a past surgical history that includes  section; Oophorectomy; Retinal detachment surgery; Cataract extraction (Left); Eye surgery; Hysterectomy;  and Tonsillectomy. Pt was evaluated in ER and found to have infected abdominal wound with cellulitis and sepsis and admitted for further evaluation and treatment.    Past Medical History:   Diagnosis Date    *Atrial flutter     Anxiety     Arthritis     Asthma     Atrial fibrillation     Back pain     Cataract     OD    CHF (congestive heart failure)     COPD (chronic obstructive pulmonary disease)     Depression     Diabetes mellitus     Emphysema of lung     Heart failure     Hernia     Hypercapnic respiratory failure, chronic 2016    Hyperlipidemia     Hypertension     Iron deficiency anemia 2/3/2016    Myocardial infarction     Obesity     Peripheral vascular disease     Pneumonia     Polyneuropathy     Retinal detachment     OS    Skin ulcer 3/18/2017    Tobacco dependence     Type II 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, not stated as uncontrolled        Past Surgical History:   Procedure Laterality Date    CATARACT EXTRACTION Left     OS      SECTION      x2    EYE SURGERY      HYSTERECTOMY      OOPHORECTOMY      one ovary conserved    RETINAL DETACHMENT SURGERY      buckle --OS    TONSILLECTOMY         Review of patient's allergies indicates:   Allergen Reactions    Dilaudid [hydromorphone] Anaphylaxis     Other reaction(s): Anaphylaxis  Other reaction(s): Unknown       No current facility-administered medications on file prior to encounter.      Current Outpatient Prescriptions on File Prior to Encounter   Medication Sig    acetaminophen (TYLENOL) 325 MG tablet Take 2 tablets (650 mg total) by mouth every 6 (six) hours as needed.    albuterol (ACCUNEB) 0.63 mg/3 mL Nebu INHALE THE CONTENTS OF 1 VIAL  BY  NEBULIZER EVERY 6 HOURS AS NEEDED    albuterol (VENTOLIN HFA) 90 mcg/actuation inhaler INHALE TWO PUFFS BY MOUTH EVERY 6 HOURS AS NEEDED FOR WHEEZING    albuterol-ipratropium 2.5mg-0.5mg/3mL (DUO-NEB) 0.5 mg-3 mg(2.5 mg  base)/3 mL nebulizer solution INHALE THE CONTENTS OF 1 VIAL VIA NEBULIZER EVERY 6 HOURS AS NEEDED FOR  WHEEZING (DO NOT USE WITH ALBUTEROL INHALER)    ascorbic acid, vitamin C, (VITAMIN C) 500 MG tablet Take 1 tablet (500 mg total) by mouth every evening.    atorvastatin (LIPITOR) 20 MG tablet Take 1 tablet (20 mg total) by mouth once daily.    blood sugar diagnostic (TRUE METRIX GLUCOSE TEST STRIP) Strp Use to test blood sugar three times a day   DX:E11.9    blood-glucose meter (TRUE METRIX AIR GLUCOSE METER) kit Use as instructed to test blood sugar   DX:E11.9    budesonide (PULMICORT) 0.5 mg/2 mL nebulizer solution INHALE THE CONTENTS OF 1 VIAL VIA NEBULIZER EVERY DAY    clonazePAM (KLONOPIN) 1 MG tablet Take 1 tablet (1 mg total) by mouth 2 (two) times daily as needed for Anxiety.    escitalopram oxalate (LEXAPRO) 10 MG tablet TAKE 1 TABLET ONE TIME DAILY    furosemide (LASIX) 40 MG tablet Take 1 tablet (40 mg total) by mouth 2 (two) times daily.    gabapentin (NEURONTIN) 600 MG tablet Take 1 tablet (600 mg total) by mouth 3 (three) times daily.    hydrocodone-acetaminophen 10-325mg (NORCO)  mg Tab Take 1 tablet by mouth every 6 (six) hours as needed. (Patient taking differently: Take 1 tablet by mouth every 6 (six) hours as needed for Pain. )    lancets (TRUEPLUS LANCETS) 33 gauge Misc 1 lancet by Misc.(Non-Drug; Combo Route) route 3 (three) times daily. To test blood sugar   DX: E11.9    levalbuterol (XOPENEX) 0.63 mg/3 mL nebulizer solution INHALE THE CONTENTS OF 1 VIAL BY NEBULIZATION 3 (THREE) TIMES DAILY.    lidocaine-prilocaine (EMLA) cream 2 gms to affected area 1/2 hr  before each wound dressing.    lisinopril (PRINIVIL,ZESTRIL) 20 MG tablet TAKE 1 TABLET ONE TIME DAILY    magnesium hydroxide 400 mg/5 ml (MILK OF MAGNESIA) 400 mg/5 mL Susp Take 30 mLs by mouth daily as needed.    metformin (GLUCOPHAGE) 500 MG tablet TAKE 1 TABLET TWICE DAILY WITH MEALS    methocarbamol (ROBAXIN)  750 MG Tab TAKE 1 TABLET FOUR TIMES DAILY    metoprolol succinate (TOPROL-XL) 25 MG 24 hr tablet Take 0.5 tablets (12.5 mg total) by mouth once daily. (Patient taking differently: Take 12.5 mg by mouth once daily. On hold waiting for dr visit)    miconazole NITRATE 2 % (MICOTIN) 2 % top powder Apply topically 2 (two) times daily. Skin folds    multivitamin (THERAGRAN) tablet Take 1 tablet by mouth once daily.    oxycodone (ROXICODONE) 10 mg Tab immediate release tablet Take 1 tablet (10 mg total) by mouth every 12 (twelve) hours as needed for Pain.    polyethylene glycol (GLYCOLAX) 17 gram/dose powder MIX 17 GRAMS IN LIQUID AND DRINK EVERY DAY AS NEEDED    potassium chloride SA (K-DUR,KLOR-CON) 20 MEQ tablet Take 1 tablet (20 mEq total) by mouth once daily.    SANTYL ointment APPLY  OINTMENT TOPICALLY TO AFFECTED AREA ONCE DAILY    SPIRIVA WITH HANDIHALER 18 mcg inhalation capsule INHALE THE CONTENTS OF 1 CAPSULE EVERY DAY    temazepam (RESTORIL) 15 mg Cap TAKE 1 CAPSULE ONE TIME DAILY (Patient taking differently: TAKE 1 CAPSULE HS PRN insomnia)    warfarin (COUMADIN) 5 MG tablet TAKE ONE TABLET EVERY DAY OR AS DIRECTED BY COUMADIN CLINIC (Patient taking differently: 5 mg DAILY)    trazodone (DESYREL) 150 MG tablet Take 1 tablet (150 mg total) by mouth every evening. (Patient taking differently: Take 150 mg by mouth nightly as needed. )     Family History     Problem Relation (Age of Onset)    Alcohol abuse Mother    Arthritis Father, Sister    Blindness Son    Cancer Brother    Crohn's disease Sister    Early death Sister (30)    Heart disease Father, Sister (32), Sister    No Known Problems Brother, Daughter        Social History Main Topics    Smoking status: Current Some Day Smoker     Packs/day: 0.25     Years: 50.00     Types: Cigarettes     Start date: 1/19/1968     Last attempt to quit: 9/19/2015    Smokeless tobacco: Former User     Quit date: 2/12/2017      Comment: 1 ppd for 20 years     Alcohol use No    Drug use: No    Sexual activity: Not Currently     Review of Systems   Constitutional: Positive for activity change, chills, fatigue and fever.   HENT: Negative for ear pain and hearing loss.    Eyes: Negative for pain and redness.   Respiratory: Positive for cough and shortness of breath. Negative for apnea, choking, chest tightness and stridor.    Cardiovascular: Positive for leg swelling. Negative for chest pain and palpitations.   Gastrointestinal: Positive for abdominal pain. Negative for blood in stool, constipation, diarrhea, nausea and vomiting.   Endocrine: Negative for polydipsia and polyphagia.   Genitourinary: Negative for difficulty urinating, dysuria, flank pain and hematuria.   Musculoskeletal: Positive for arthralgias and gait problem. Negative for neck pain and neck stiffness.   Skin: Positive for color change and wound.        Abdominal wound to left abdomen with ulcerated areas with significant surrounding cellulitis   Allergic/Immunologic: Negative for food allergies.   Neurological: Positive for weakness. Negative for syncope and speech difficulty.   Hematological: Bruises/bleeds easily.   Psychiatric/Behavioral: Negative for confusion, decreased concentration and suicidal ideas.     Objective:     Vital Signs (Most Recent):  Temp: (!) 102.3 °F (39.1 °C) (04/05/17 1705)  Pulse: 95 (04/05/17 1705)  Resp: 16 (04/05/17 1705)  BP: (!) 111/57 (04/05/17 1545)  SpO2: 98 % (04/05/17 1705) Vital Signs (24h Range):  Temp:  [102.3 °F (39.1 °C)-103.9 °F (39.9 °C)] 102.3 °F (39.1 °C)  Pulse:  [] 95  Resp:  [16-24] 16  SpO2:  [87 %-98 %] 98 %  BP: (111)/(57) 111/57     Weight: (!) 142 kg (313 lb)  Body mass index is 47.59 kg/(m^2).    Physical Exam   Constitutional: She is oriented to person, place, and time. She appears well-developed. No distress.   Morbidly obese   HENT:   Head: Normocephalic and atraumatic.   Eyes: Conjunctivae and EOM are normal. Pupils are equal, round, and  reactive to light. Right eye exhibits no discharge. Left eye exhibits no discharge.   Neck: Normal range of motion. Neck supple.   Cardiovascular: Normal rate, regular rhythm and intact distal pulses.    Pulmonary/Chest: Effort normal. No stridor. No respiratory distress.   BBS diminished   Abdominal: Soft. Bowel sounds are normal. There is tenderness. There is no rebound and no guarding.   Tenderness surrounding abdominal wound   Genitourinary:   Genitourinary Comments: Not examined   Musculoskeletal: Normal range of motion. She exhibits no edema.   Neurological: She is alert and oriented to person, place, and time. No cranial nerve deficit.   Skin: Skin is warm and dry. She is not diaphoretic. There is erythema.   Abdominal wound to left abdomen with ulcerated areas with significant surrounding cellulitis- See picture taken in ER   Psychiatric: She has a normal mood and affect. Her behavior is normal. Judgment and thought content normal.        Significant Labs: Resulted    Significant Imaging: Reviewed    Assessment/Plan:     * Sepsis  Monitor closely  ID consulted  Continue IV zosyn, vancomycin, and cipro for now  BC x 2 pending      Diabetes mellitus with neuropathy  2000 ADA diet  Accuchecks with correctional SSI  Continue home medications      Chronic obstructive pulmonary disease with acute exacerbation  Monitor O2 sats  Supplemental O2  Add pulmicort      Major depression, recurrent, chronic  Continue home antidepressants      Chronic atrial fibrillation  With chronic anticoagulation with Coumadin-  Telemetry  Continue home coumadin  Daily INR- INR today at 2.2      Hypertension associated with diabetes  Continue home antihypertensives      Morbid obesity with BMI of 45.0-49.9, adult  Encourage increased activity as tolerated and diet compliance      PVD (peripheral vascular disease)  .        Chronic combined systolic and diastolic congestive heart failure  Telemetry  Lasix IV x 1  dose  Monitor  Supplemental O2 as needed        Chronic respiratory failure with hypoxia  As above  Continue home O2      Acute on chronic diastolic congestive heart failure        Nonhealing skin ulcer, limited to breakdown of skin  Consult wound care  Consult surgeon for possible debridement      Hypomagnesemia  Monitor  Supplement as needed      VTE Risk Mitigation     None        DARIEN Bobo  Department of Hospital Medicine   [facility]    Time spent seeing patient( greater than 1/2 spent in direct contact) : 76 minutes

## 2017-04-05 NOTE — TELEPHONE ENCOUNTER
----- Message from Aubrie Langston sent at 4/4/2017  2:20 PM CDT -----  Contact: Self-  [phone]  Patient called asking to speak with the nurse.  Thanks!

## 2017-04-05 NOTE — ED NOTES
Pt received approx 1200cc of NS. Fluids are stopped per Dr. Ridley order because of patients hx of CHF.

## 2017-04-05 NOTE — TELEPHONE ENCOUNTER
----- Message from RT Kaila sent at 4/5/2017 10:02 AM CDT -----  Contact: pt    pt , requesting to check the status of her daughter being able to provide home care for her, thanks.

## 2017-04-05 NOTE — SUBJECTIVE & OBJECTIVE
Past Medical History:   Diagnosis Date    *Atrial flutter     Anxiety     Arthritis     Asthma     Atrial fibrillation     Back pain     Cataract     OD    CHF (congestive heart failure)     COPD (chronic obstructive pulmonary disease)     Depression     Diabetes mellitus     Emphysema of lung     Heart failure     Hernia     Hypercapnic respiratory failure, chronic 2016    Hyperlipidemia     Hypertension     Iron deficiency anemia 2/3/2016    Myocardial infarction     Obesity     Peripheral vascular disease     Pneumonia     Polyneuropathy     Retinal detachment     OS    Skin ulcer 3/18/2017    Tobacco dependence     Type II 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, not stated as uncontrolled        Past Surgical History:   Procedure Laterality Date    CATARACT EXTRACTION Left     OS      SECTION      x2    EYE SURGERY      HYSTERECTOMY      OOPHORECTOMY      one ovary conserved    RETINAL DETACHMENT SURGERY      buckle --OS    TONSILLECTOMY         Review of patient's allergies indicates:   Allergen Reactions    Dilaudid [hydromorphone] Anaphylaxis     Other reaction(s): Anaphylaxis  Other reaction(s): Unknown       No current facility-administered medications on file prior to encounter.      Current Outpatient Prescriptions on File Prior to Encounter   Medication Sig    acetaminophen (TYLENOL) 325 MG tablet Take 2 tablets (650 mg total) by mouth every 6 (six) hours as needed.    albuterol (ACCUNEB) 0.63 mg/3 mL Nebu INHALE THE CONTENTS OF 1 VIAL  BY  NEBULIZER EVERY 6 HOURS AS NEEDED    albuterol (VENTOLIN HFA) 90 mcg/actuation inhaler INHALE TWO PUFFS BY MOUTH EVERY 6 HOURS AS NEEDED FOR WHEEZING    albuterol-ipratropium 2.5mg-0.5mg/3mL (DUO-NEB) 0.5 mg-3 mg(2.5 mg base)/3 mL nebulizer solution INHALE THE CONTENTS OF 1 VIAL VIA NEBULIZER EVERY 6 HOURS AS NEEDED FOR  WHEEZING (DO NOT USE WITH ALBUTEROL INHALER)    ascorbic acid,  vitamin C, (VITAMIN C) 500 MG tablet Take 1 tablet (500 mg total) by mouth every evening.    atorvastatin (LIPITOR) 20 MG tablet Take 1 tablet (20 mg total) by mouth once daily.    blood sugar diagnostic (TRUE METRIX GLUCOSE TEST STRIP) Strp Use to test blood sugar three times a day   DX:E11.9    blood-glucose meter (TRUE METRIX AIR GLUCOSE METER) kit Use as instructed to test blood sugar   DX:E11.9    budesonide (PULMICORT) 0.5 mg/2 mL nebulizer solution INHALE THE CONTENTS OF 1 VIAL VIA NEBULIZER EVERY DAY    clonazePAM (KLONOPIN) 1 MG tablet Take 1 tablet (1 mg total) by mouth 2 (two) times daily as needed for Anxiety.    escitalopram oxalate (LEXAPRO) 10 MG tablet TAKE 1 TABLET ONE TIME DAILY    furosemide (LASIX) 40 MG tablet Take 1 tablet (40 mg total) by mouth 2 (two) times daily.    gabapentin (NEURONTIN) 600 MG tablet Take 1 tablet (600 mg total) by mouth 3 (three) times daily.    hydrocodone-acetaminophen 10-325mg (NORCO)  mg Tab Take 1 tablet by mouth every 6 (six) hours as needed. (Patient taking differently: Take 1 tablet by mouth every 6 (six) hours as needed for Pain. )    lancets (TRUEPLUS LANCETS) 33 gauge Misc 1 lancet by Misc.(Non-Drug; Combo Route) route 3 (three) times daily. To test blood sugar   DX: E11.9    levalbuterol (XOPENEX) 0.63 mg/3 mL nebulizer solution INHALE THE CONTENTS OF 1 VIAL BY NEBULIZATION 3 (THREE) TIMES DAILY.    lidocaine-prilocaine (EMLA) cream 2 gms to affected area 1/2 hr  before each wound dressing.    lisinopril (PRINIVIL,ZESTRIL) 20 MG tablet TAKE 1 TABLET ONE TIME DAILY    magnesium hydroxide 400 mg/5 ml (MILK OF MAGNESIA) 400 mg/5 mL Susp Take 30 mLs by mouth daily as needed.    metformin (GLUCOPHAGE) 500 MG tablet TAKE 1 TABLET TWICE DAILY WITH MEALS    methocarbamol (ROBAXIN) 750 MG Tab TAKE 1 TABLET FOUR TIMES DAILY    metoprolol succinate (TOPROL-XL) 25 MG 24 hr tablet Take 0.5 tablets (12.5 mg total) by mouth once daily. (Patient taking  differently: Take 12.5 mg by mouth once daily. On hold waiting for dr visit)    miconazole NITRATE 2 % (MICOTIN) 2 % top powder Apply topically 2 (two) times daily. Skin folds    multivitamin (THERAGRAN) tablet Take 1 tablet by mouth once daily.    oxycodone (ROXICODONE) 10 mg Tab immediate release tablet Take 1 tablet (10 mg total) by mouth every 12 (twelve) hours as needed for Pain.    polyethylene glycol (GLYCOLAX) 17 gram/dose powder MIX 17 GRAMS IN LIQUID AND DRINK EVERY DAY AS NEEDED    potassium chloride SA (K-DUR,KLOR-CON) 20 MEQ tablet Take 1 tablet (20 mEq total) by mouth once daily.    SANTYL ointment APPLY  OINTMENT TOPICALLY TO AFFECTED AREA ONCE DAILY    SPIRIVA WITH HANDIHALER 18 mcg inhalation capsule INHALE THE CONTENTS OF 1 CAPSULE EVERY DAY    temazepam (RESTORIL) 15 mg Cap TAKE 1 CAPSULE ONE TIME DAILY (Patient taking differently: TAKE 1 CAPSULE HS PRN insomnia)    warfarin (COUMADIN) 5 MG tablet TAKE ONE TABLET EVERY DAY OR AS DIRECTED BY COUMADIN CLINIC (Patient taking differently: 5 mg DAILY)    trazodone (DESYREL) 150 MG tablet Take 1 tablet (150 mg total) by mouth every evening. (Patient taking differently: Take 150 mg by mouth nightly as needed. )     Family History     Problem Relation (Age of Onset)    Alcohol abuse Mother    Arthritis Father, Sister    Blindness Son    Cancer Brother    Crohn's disease Sister    Early death Sister (30)    Heart disease Father, Sister (32), Sister    No Known Problems Brother, Daughter        Social History Main Topics    Smoking status: Current Some Day Smoker     Packs/day: 0.25     Years: 50.00     Types: Cigarettes     Start date: 1/19/1968     Last attempt to quit: 9/19/2015    Smokeless tobacco: Former User     Quit date: 2/12/2017      Comment: 1 ppd for 20 years    Alcohol use No    Drug use: No    Sexual activity: Not Currently     Review of Systems   Constitutional: Positive for activity change, chills, fatigue and fever.   HENT:  Negative for ear pain and hearing loss.    Eyes: Negative for pain and redness.   Respiratory: Positive for cough and shortness of breath. Negative for apnea, choking, chest tightness and stridor.    Cardiovascular: Positive for leg swelling. Negative for chest pain and palpitations.   Gastrointestinal: Positive for abdominal pain. Negative for blood in stool, constipation, diarrhea, nausea and vomiting.   Endocrine: Negative for polydipsia and polyphagia.   Genitourinary: Negative for difficulty urinating, dysuria, flank pain and hematuria.   Musculoskeletal: Positive for arthralgias and gait problem. Negative for neck pain and neck stiffness.   Skin: Positive for color change and wound.        Abdominal wound to left abdomen with ulcerated areas with significant surrounding cellulitis   Allergic/Immunologic: Negative for food allergies.   Neurological: Positive for weakness. Negative for syncope and speech difficulty.   Hematological: Bruises/bleeds easily.   Psychiatric/Behavioral: Negative for confusion, decreased concentration and suicidal ideas.     Objective:     Vital Signs (Most Recent):  Temp: (!) 102.3 °F (39.1 °C) (04/05/17 1705)  Pulse: 95 (04/05/17 1705)  Resp: 16 (04/05/17 1705)  BP: (!) 111/57 (04/05/17 1545)  SpO2: 98 % (04/05/17 1705) Vital Signs (24h Range):  Temp:  [102.3 °F (39.1 °C)-103.9 °F (39.9 °C)] 102.3 °F (39.1 °C)  Pulse:  [] 95  Resp:  [16-24] 16  SpO2:  [87 %-98 %] 98 %  BP: (111)/(57) 111/57     Weight: (!) 142 kg (313 lb)  Body mass index is 47.59 kg/(m^2).    Physical Exam   Constitutional: She is oriented to person, place, and time. She appears well-developed. No distress.   Morbidly obese   HENT:   Head: Normocephalic and atraumatic.   Eyes: Conjunctivae and EOM are normal. Pupils are equal, round, and reactive to light. Right eye exhibits no discharge. Left eye exhibits no discharge.   Neck: Normal range of motion. Neck supple.   Cardiovascular: Normal rate, regular rhythm  and intact distal pulses.    Pulmonary/Chest: Effort normal. No stridor. No respiratory distress.   BBS diminished   Abdominal: Soft. Bowel sounds are normal. There is tenderness. There is no rebound and no guarding.   Tenderness surrounding abdominal wound   Genitourinary:   Genitourinary Comments: Not examined   Musculoskeletal: Normal range of motion. She exhibits no edema.   Neurological: She is alert and oriented to person, place, and time. No cranial nerve deficit.   Skin: Skin is warm and dry. She is not diaphoretic. There is erythema.   Abdominal wound to left abdomen with ulcerated areas with significant surrounding cellulitis- See picture taken in ER   Psychiatric: She has a normal mood and affect. Her behavior is normal. Judgment and thought content normal.        Significant Labs: Resulted    Significant Imaging: Reviewed

## 2017-04-05 NOTE — IP AVS SNAPSHOT
[address] [place] [address]  Phone: [phone]           Patient Discharge Instructions   Our goal is to set you up for success. This packet includes information on your condition, medications, and your home care.  It will help you care for yourself to prevent having to return to the hospital.     Please ask your nurse if you have any questions.      There are many details to remember when preparing to leave the hospital. Here is what you will need to do:    1. Take your medicine. If you are prescribed medications, review your Medication List on the following pages. You may have new medications to  at the pharmacy and others that you'll need to stop taking. Review the instructions for how and when to take your medications. Talk with your doctor or nurses if you are unsure of what to do.     2. Go to your follow-up appointments. Specific follow-up information is listed in the following pages. Your may be contacted by a nurse or clinical provider about future appointments. Be sure we have all of the phone numbers to reach you. Please contact your provider's office if you are unable to make an appointment.     3. Watch for warning signs. Your doctor or nurse will give you detailed warning signs to watch for and when to call for assistance. These instructions may also include educational information about your condition. If you experience any of warning signs to your health, call your doctor.               ** Verify the list of medication(s) below is accurate and up to date. Carry this with you in case of emergency. If your medications have changed, please notify your healthcare provider.             Medication List      START taking these medications        Additional Info    Begin Date AM Noon PM Bedtime    ciprofloxacin HCl 500 MG tablet   Commonly known as:  CIPRO   Quantity:  10 tablet   Refills:  0   Dose:  500 mg   Indications:  Skin & soft tissue    Last  time this was given:  500 mg on 4/11/2017 12:11 PM   Instructions:  Take 1 tablet (500 mg total) by mouth every 12 (twelve) hours.                               enoxaparin 150 mg/mL Syrg   Commonly known as:  LOVENOX   Quantity:  6 mL   Refills:  1   Dose:  140 mg    Last time this was given:  140 mg on 4/11/2017  6:39 AM   Instructions:  Inject 0.93 mLs (140 mg total) into the skin every 12 (twelve) hours. Substitution  permitted                               fluconazole 200 MG Tab   Commonly known as:  DIFLUCAN   Quantity:  9 tablet   Refills:  0   Dose:  200 mg    Last time this was given:  200 mg on 4/10/2017  5:22 PM   Instructions:  Take 1 tablet (200 mg total) by mouth once daily.                               magnesium oxide 400 mg tablet   Commonly known as:  MAG-OX   Refills:  0   Dose:  400 mg    Last time this was given:  400 mg on 4/11/2017  8:04 AM   Instructions:  Take 1 tablet (400 mg total) by mouth 2 (two) times daily.                               multivit, Ca, min-FA-soy isofl 400-60 mcg-mg Tab   Refills:  0   Dose:  1 tablet    Instructions:  Take 1 tablet by mouth once daily.                               zinc sulfate 220 (50) mg capsule   Commonly known as:  ZINCATE   Refills:  0   Dose:  220 mg    Last time this was given:  220 mg on 4/11/2017  8:04 AM   Instructions:  Take 1 capsule (220 mg total) by mouth once daily.                                 CHANGE how you take these medications        Additional Info    Begin Date AM Noon PM Bedtime    ascorbic acid (vitamin C) 500 MG tablet   Commonly known as:  VITAMIN C   Refills:  0   Dose:  500 mg   What changed:  when to take this    Last time this was given:  500 mg on 4/10/2017  9:05 PM   Instructions:  Take 1 tablet (500 mg total) by mouth every evening.                               budesonide 0.5 mg/2 mL nebulizer solution   Commonly known as:  PULMICORT   Quantity:  180 mL   Refills:  4   What changed:  See the new instructions.    Last  time this was given:  0.5 mg on 4/11/2017  7:48 AM   Instructions:  INHALE THE CONTENTS OF 1 VIAL VIA NEBULIZER EVERY DAY                            hydrocodone-acetaminophen 10-325mg  mg Tab   Commonly known as:  NORCO   Quantity:  120 tablet   Refills:  0   Dose:  1 tablet   What changed:  reasons to take this    Last time this was given:  1 tablet on 4/11/2017 12:12 PM   Instructions:  Take 1 tablet by mouth every 6 (six) hours as needed.                            lisinopril 20 MG tablet   Commonly known as:  PRINIVIL,ZESTRIL   Refills:  0   Dose:  10 mg   What changed:  See the new instructions.    Last time this was given:  2.5 mg on 4/11/2017  8:04 AM   Instructions:  Take 0.5 tablets (10 mg total) by mouth once daily.                               metoprolol succinate 25 MG 24 hr tablet   Commonly known as:  TOPROL-XL   Refills:  0   Dose:  12.5 mg   Indications:  Hold  for heart rate less than 60 or DBP < 60   What changed:  additional instructions    Last time this was given:  12.5 mg on 4/11/2017  8:04 AM   Instructions:  Take 0.5 tablets (12.5 mg total) by mouth once daily. On hold waiting for dr linette RENDON ointment   Quantity:  90 g   Refills:  2   What changed:  See the new instructions.   Generic drug:  collagenase    Last time this was given:  4/11/2017  8:07 AM   Instructions:  APPLY  OINTMENT TOPICALLY TO AFFECTED AREA ONCE DAILY                               temazepam 15 mg Cap   Commonly known as:  RESTORIL   Quantity:  90 capsule   Refills:  3   What changed:  See the new instructions.    Instructions:  TAKE 1 CAPSULE ONE TIME DAILY                               warfarin 5 MG tablet   Commonly known as:  COUMADIN   Quantity:  90 tablet   Refills:  1   Dose:  2.5 mg   What changed:  See the new instructions.    Last time this was given:  5 mg on 4/10/2017  5:22 PM   Instructions:  Take 0.5 tablets (2.5 mg total) by mouth Daily.                                  CONTINUE taking these medications        Additional Info    Begin Date AM Noon PM Bedtime    acetaminophen 325 MG tablet   Commonly known as:  TYLENOL   Refills:  0   Dose:  650 mg    Last time this was given:  1,000 mg on 4/5/2017  3:54 PM   Instructions:  Take 2 tablets (650 mg total) by mouth every 6 (six) hours as needed.                            * albuterol 90 mcg/actuation inhaler   Commonly known as:  VENTOLIN HFA   Quantity:  18 each   Refills:  3    Instructions:  INHALE TWO PUFFS BY MOUTH EVERY 6 HOURS AS NEEDED FOR WHEEZING                            * albuterol 0.63 mg/3 mL Nebu   Commonly known as:  ACCUNEB   Quantity:  75 mL   Refills:  11    Instructions:  INHALE THE CONTENTS OF 1 VIAL  BY  NEBULIZER EVERY 6 HOURS AS NEEDED                            albuterol-ipratropium 2.5mg-0.5mg/3mL 0.5 mg-3 mg(2.5 mg base)/3 mL nebulizer solution   Commonly known as:  DUO-NEB   Quantity:  90 mL   Refills:  0    Last time this was given:  3 mLs on 4/11/2017 11:38 AM   Instructions:  INHALE THE CONTENTS OF 1 VIAL VIA NEBULIZER EVERY 6 HOURS AS NEEDED FOR  WHEEZING (DO NOT USE WITH ALBUTEROL INHALER)                            atorvastatin 20 MG tablet   Commonly known as:  LIPITOR   Quantity:  90 tablet   Refills:  3   Dose:  20 mg    Last time this was given:  20 mg on 4/11/2017  8:04 AM   Instructions:  Take 1 tablet (20 mg total) by mouth once daily.                               clonazePAM 1 MG tablet   Commonly known as:  KLONOPIN   Quantity:  60 tablet   Refills:  4   Dose:  1 mg    Last time this was given:  1 mg on 4/11/2017  9:52 AM   Instructions:  Take 1 tablet (1 mg total) by mouth 2 (two) times daily as needed for Anxiety.                            escitalopram oxalate 10 MG tablet   Commonly known as:  LEXAPRO   Quantity:  90 tablet   Refills:  3    Instructions:  TAKE 1 TABLET ONE TIME DAILY                               furosemide 40 MG tablet   Commonly known as:  LASIX   Quantity:  60 tablet    Refills:  0   Dose:  40 mg    Last time this was given:  40 mg on 4/11/2017  8:04 AM   Instructions:  Take 1 tablet (40 mg total) by mouth 2 (two) times daily.                               gabapentin 600 MG tablet   Commonly known as:  NEURONTIN   Quantity:  270 tablet   Refills:  3   Dose:  600 mg    Instructions:  Take 1 tablet (600 mg total) by mouth 3 (three) times daily.                               levalbuterol 0.63 mg/3 mL nebulizer solution   Commonly known as:  XOPENEX   Quantity:  792 mL   Refills:  3    Instructions:  INHALE THE CONTENTS OF 1 VIAL BY NEBULIZATION 3 (THREE) TIMES DAILY.                            metformin 500 MG tablet   Commonly known as:  GLUCOPHAGE   Quantity:  180 tablet   Refills:  3    Last time this was given:  500 mg on 4/11/2017  8:04 AM   Instructions:  TAKE 1 TABLET TWICE DAILY WITH MEALS                               methocarbamol 750 MG Tab   Commonly known as:  ROBAXIN   Quantity:  360 tablet   Refills:  1    Instructions:  TAKE 1 TABLET FOUR TIMES DAILY                               miconazole NITRATE 2 % 2 % top powder   Commonly known as:  MICOTIN   Refills:  0    Last time this was given:  4/11/2017  8:08 AM   Instructions:  Apply topically 2 (two) times daily. Skin folds                            MILK OF MAGNESIA 400 mg/5 mL Susp   Refills:  0   Dose:  30 mL   Generic drug:  magnesium hydroxide 400 mg/5 ml    Last time this was given:  4,800 mg on 4/10/2017  6:04 PM   Instructions:  Take 30 mLs by mouth daily as needed.                            multivitamin tablet   Commonly known as:  THERAGRAN   Refills:  0   Dose:  1 tablet    Last time this was given:  1 tablet on 4/11/2017  8:04 AM   Instructions:  Take 1 tablet by mouth once daily.                               oxycodone 10 mg Tab immediate release tablet   Commonly known as:  ROXICODONE   Quantity:  60 tablet   Refills:  0   Dose:  10 mg    Last time this was given:  10 mg on 4/11/2017  8:10 AM    Instructions:  Take 1 tablet (10 mg total) by mouth every 12 (twelve) hours as needed for Pain.                            polyethylene glycol 17 gram/dose powder   Commonly known as:  GLYCOLAX   Quantity:  1530 g   Refills:  11    Instructions:  MIX 17 GRAMS IN LIQUID AND DRINK EVERY DAY AS NEEDED                            potassium chloride SA 20 MEQ tablet   Commonly known as:  K-DUR,KLOR-CON   Quantity:  30 tablet   Refills:  1   Dose:  20 mEq    Last time this was given:  20 mEq on 4/11/2017  8:04 AM   Instructions:  Take 1 tablet (20 mEq total) by mouth once daily.                               SPIRIVA WITH HANDIHALER 18 mcg inhalation capsule   Quantity:  90 capsule   Refills:  1   Generic drug:  tiotropium    Instructions:  INHALE THE CONTENTS OF 1 CAPSULE EVERY DAY                            * Notice:  This list has 2 medication(s) that are the same as other medications prescribed for you. Read the directions carefully, and ask your doctor or other care provider to review them with you.         Where to Get Your Medications      These medications were sent to [facility] [address]  [place] [address]  [address] [place] [address]     Phone:  [phone]     ciprofloxacin HCl 500 MG tablet    enoxaparin 150 mg/mL Syrg    fluconazole 200 MG Tab         You can get these medications from any pharmacy     You don't need a prescription for these medications     ascorbic acid (vitamin C) 500 MG tablet    magnesium oxide 400 mg tablet    multivit, Ca, min-FA-soy isofl 400-60 mcg-mg Tab    zinc sulfate 220 (50) mg capsule         Information about where to get these medications is not yet available     ! Ask your nurse or doctor about these medications     lisinopril 20 MG tablet    metoprolol succinate 25 MG 24 hr tablet    warfarin 5 MG tablet                  Please bring to all follow up appointments:    1. A copy of your discharge instructions.  2. All medicines you are  currently taking in their original bottles.  3. Identification and insurance card.    Please arrive 15 minutes ahead of scheduled appointment time.    Please call 24 hours in advance if you must reschedule your appointment and/or time.        Your Scheduled Appointments     Apr 20, 2017  2:20 PM CDT   [facility]    [address]  [facility] [address]   [phone]            May 08, 2017  2:00 PM CDT   Established Patient Visit with MD [facility] - Family Medicine ([facility])    [address]  [facility] [address]   [phone]            Jun 05, 2017  9:20 AM CDT   Non-Fasting Lab with [facility] ([facility])    [address] [place] [address] Drive  [facility] [address]   [phone]            Jun 09, 2017  1:20 PM CDT   Established Patient Visit with Laura Ramos MD   [facility] - Hematology Oncology ([facility])    [address] [facility], [address]  [facility] [address]   [phone]            Jun 23, 2017  1:30 PM CDT   Established Patient Visit with Chu Mack MD   [facility] - Cardiology ([facility])    [address]  [facility] [address]   [phone]              Follow-up Information     Follow up with Constantine Bird MD In 2 weeks.    Specialty:  Family Medicine    Why:  Follow up for infected abdominal wound    Contact information:    [address]  [place] [address]  [phone]          Follow up with [facility].    Specialty:  Home Health Services    Why:  Home Health    Contact information:    [address]  [facility] [id number] [phone]        Referrals     Future Orders    Referral to Home health         Discharge Instructions     Future Orders    Activity as tolerated     Call MD for:  redness, tenderness, or signs of infection (pain, swelling,  "redness, odor or green/yellow discharge around incision site)     Call MD for:  temperature >100.4     Call MD for:     Comments:    Any decline in condition    Diet general     Comments:    1800 ADA    Questions:    Total calories:      Fat restriction, if any:      Protein restriction, if any:      Na restriction, if any:      Fluid restriction:      Additional restrictions:          Discharge Instructions       [facility] will not have the prescribed lovenox until tomorrow 04/12 at noon.      Primary Diagnosis     Your primary diagnosis was:  Infection In Bloodstream      Admission Information     Date & Time Provider Department CSN    4/5/2017  2:34 PM Misael Wesley MD [facility] [id number]      Care Providers     Provider Role Specialty Primary office phone    Misael Wesley MD Attending Provider Hospitalist [phone]    Opal Jacobsen MD Consulting Physician  Infectious Diseases [phone]      Important Medicare Message          Most Recent Value    Important Message from Medicare Regarding Discharge Appeal Rights  Explained to patient/caregiver, Signed/date by patient/caregiver yes 04/11/2017 0845      Your Vitals Were     BP Pulse Temp Resp Height Weight    134/62 (BP Location: Right arm, Patient Position: Sitting, BP Method: Automatic) 92 97.7 °F (36.5 °C) (Oral) 18 5' 8" (1.727 m) 142 kg (313 lb)    SpO2 BMI             98% 47.59 kg/m2         Recent Lab Values        7/3/2014 10/28/2014 9/10/2015 3/22/2016 10/20/2016 10/26/2016 2/14/2017 4/5/2017      8:31 AM  8:00 AM 10:02 AM  7:46 AM 11:45 AM 11:11 AM  3:22 PM  3:00 PM    A1C 5.5 5.6 5.6 5.1 6.2 6.3 (H) 6.1 6.5 (H)    Comment for A1C at 11:45 AM on 10/20/2016:  According to ADA guidelines, hemoglobin A1C <7.0% represents  optimal control in non-pregnant diabetic patients.  Different  metrics may apply to specific populations.   Standards of Medical Care in Diabetes - 2016.  For the purpose of screening for the presence of " diabetes:  <5.7%     Consistent with the absence of diabetes  5.7-6.4%  Consistent with increasing risk for diabetes   (prediabetes)  >or=6.5%  Consistent with diabetes  Currently no consensus exists for use of hemoglobin A1C  for diagnosis of diabetes for children.      Comment for A1C at 11:11 AM on 10/26/2016:  According to ADA guidelines, hemoglobin A1C <7.0% represents  optimal control in non-pregnant diabetic patients.  Different  metrics may apply to specific populations.   Standards of Medical Care in Diabetes - 2016.  For the purpose of screening for the presence of diabetes:  <5.7%     Consistent with the absence of diabetes  5.7-6.4%  Consistent with increasing risk for diabetes   (prediabetes)  >or=6.5%  Consistent with diabetes  Currently no consensus exists for use of hemoglobin A1C  for diagnosis of diabetes for children.      Comment for A1C at  3:22 PM on 2/14/2017:  According to ADA guidelines, hemoglobin A1C <7.0% represents  optimal control in non-pregnant diabetic patients.  Different  metrics may apply to specific populations.   Standards of Medical Care in Diabetes - 2016.  For the purpose of screening for the presence of diabetes:  <5.7%     Consistent with the absence of diabetes  5.7-6.4%  Consistent with increasing risk for diabetes   (prediabetes)  >or=6.5%  Consistent with diabetes  Currently no consensus exists for use of hemoglobin A1C  for diagnosis of diabetes for children.      Comment for A1C at  3:00 PM on 4/5/2017:  According to ADA guidelines, hemoglobin A1C <7.0% represents  optimal control in non-pregnant diabetic patients.  Different  metrics may apply to specific populations.   Standards of Medical Care in Diabetes - 2016.  For the purpose of screening for the presence of diabetes:  <5.7%     Consistent with the absence of diabetes  5.7-6.4%  Consistent with increasing risk for diabetes   (prediabetes)  >or=6.5%  Consistent with diabetes  Currently no consensus exists for  use of hemoglobin A1C  for diagnosis of diabetes for children.        Pending Labs     Order Current Status    Culture, Anaerobic Preliminary result      Allergies as of 4/11/2017        Reactions    Dilaudid [Hydromorphone] Anaphylaxis    Other reaction(s): Anaphylaxis  Other reaction(s): Unknown      [facility]     [facility] On Call Nurse Care Line - 24/7 Assistance  Unless otherwise directed by your provider, please contact [facility], our nurse care line that is available for 24/7 assistance.     Registered nurses in the [facility] provide clinical advisement, health education, appointment booking, and other advisory services.  Call for this free service at [phone].        Advance Directives     An advance directive is a document which, in the event you are no longer able to make decisions for yourself, tells your healthcare team what kind of treatment you do or do not want to receive, or who you would like to make those decisions for you.  If you do not currently have an advance directive, [facility] encourages you to create one.  For more information call:  [phone]), [phone] ([phone]),  or log on to [email or web address].        Smoking Cessation     If you would like to quit smoking:   You may be eligible for free services if you are a [place] resident and started smoking cigarettes before September 1, 1988.  Call the Smoking Cessation Trust ([facility]) toll free at [phone] or [phone].   Call 3-754-QUIT-NOW if you do not meet the above criteria.   Contact us via email: [email or web address]   View our website for more information: [email or web address]        Language Assistance Services     ATTENTION: Language assistance services are available, free of charge. Please call [phone].      ATENCIÓN: Si habla español, tiene a dimas disposición servicios gratuitos de asistencia lingüística. Llame al [phone].     CHÚ Ý: N?u b?n  nói Ti?ng Vi?t, có các d?ch v? h? tr? ngôn ng? mi?n phí dành cho b?n. G?i s? [phone].        Heart Failure Education       Heart Failure: Being Active  You have a condition called heart failure. Being active doesnt mean that you have to wear yourself out. Even a little movement each day helps to strengthen your heart. If you cant get out to exercise, you can do simple stretching and strengthening exercises at home. These are good ways to keep you well-conditioned and prevent you and your heart from becoming excessively weak.    Ideas to get you started  · Add a little movement to things you do now. Walk to mail letters. Park your car at the far end of the parking lot and walk to the store. Walk up a flight of stairs instead of taking the elevator.  · Choose activities you enjoy. You might walk, swim, or ride an exercise bike. Things like gardening and washing the car count, too. Other possibilities include: washing dishes, walking the dog, walking around the mall, and doing aerobic activities with friends.  · Join a group exercise program at a [facility] or [facility], a senior center, or a community center. Or look into a hospital cardiac rehabilitation program. Ask your doctor if you qualify.  Tips to keep you going  · Get up and get dressed each day. Go to a coffee shop and read a newspaper or go somewhere that you'll be in the presence of other active people. Youll feel more like being active.  · Make a plan. Choose one or more activities that you enjoy and that you can easily do. Then plan to do at least one each day. You might write your plan on a calendar.  · Go with a friend or a group if you like company. This can help you feel supported and stay motivated, too.  · Plan social events that you enjoy. This will keep you mentally engaged as well as physically motivated to do things you find pleasure in.  For your safety  · Talk with your healthcare provider before starting an exercise program.  · Exercise  indoors when its too hot or too cold outside, or when the air quality is poor. Try walking at a shopping mall.  · Wear socks and sturdy shoes to maintain your balance and prevent falls.  · Start slowly. Do a few minutes several times a day at first. Increase your time and speed little by little.  · Stop and rest whenever you feel tired or get short of breath.  · Dont push yourself on days when you dont feel well.  Date Last Reviewed: 3/20/2016  © 1961-1551 [employer]. [address] [place] [address]. All rights reserved. This information is not intended as a substitute for professional medical care. Always follow your healthcare professional's instructions.              Heart Failure: Evaluating Your Heart  You have a condition called heart failure. To evaluate your condition, your doctor will examine you, ask questions, and do some tests. Along with looking for signs of heart failure, the doctor looks for any other health problems that may have led to heart failure. The results of your evaluation will help your doctor form a treatment plan.  Health history and physical exam  Your visit will start with a health history. Tell the doctor about any symptoms youve noticed and about all medicines you take. Then youll have a physical exam. This includes listening to your heartbeat and breathing. Youll also be checked for swelling (edema) in your legs and neck. When you have fluid buildup or fluid in the lungs, it may be called congestive heart failure.  Diagnosing heart failure     During an echocardiogram, sound waves bounce off the heart. These are converted into a picture on the screen.   The following may be done to help your doctor form a diagnosis:  · X-rays show the size and shape of your heart. These pictures can also show fluid in your lungs.  · An electrocardiogram (ECG or EKG) shows the pattern of your heartbeat. Small pads (electrodes) are placed on your chest, arms, and legs.  Wires connect the pads to the ECG machine, which records your hearts electrical signals. This can give the doctor information about heart function.  · An echocardiogram uses ultrasound waves to show the structure and movement of your heart muscle. This shows how well the heart pumps. It also shows the thickness of the heart walls, and if the heart is enlarged. It is one of the most useful, non-invasive tests as it provides information about the heart's general function. This helps your doctor make treatment decisions.  · Lab tests evaluate small amounts of blood or urine for signs of problems. A BNP lab test can help diagnose and evaluate heart failure. BNP stands for B-type natriuretic peptide. The ventricles secrete more BNP when heart failure worsens. Lab tests can also provide information about metabolic dysfunction or heart dysfunction.  Your treatment plan  Based on the results of your evaluation and tests, your doctor will develop a treatment plan. This plan is designed to relieve some of your heart failure symptoms and help make you more comfortable. Your treatment plan may include:  · Medicine to help your heart work better and improve your quality of life  · Changes in what you eat and drink to help prevent fluid from backing up in your body  · Daily monitoring of your weight and heart failure symptoms to see how well your treatment plan is working  · Exercise to help you stay healthy  · Help with quitting smoking  · Emotional and psychological support to help adjust to the changes  · Referrals to other specialists to make sure you are being treated comprehensively  Date Last Reviewed: 3/21/2016  © 5045-0331 [employer]. [address] [place] [address], [place] [address]. All rights reserved. This information is not intended as a substitute for professional medical care. Always follow your healthcare professional's instructions.              Heart Failure: Making Changes to Your Diet  You have a  condition called heart failure. When you have heart failure, excess fluid is more likely to build up in your body because your heart isn't working well. This makes the heart work harder to pump blood. Fluid buildup causes symptoms such as shortness of breath and swelling (edema). This is often referred to as congestive heart failure or CHF. Controlling the amount of salt (sodium) you eat may help stop fluid from building up. Your doctor may also tell you to reduce the amount of fluid you drink.  Reading food labels    Your healthcare provider will tell you how much sodium you can eat each day. Read food labels to keep track. Keep in mind that certain foods are high in salt. These include canned, frozen, and processed foods. Check the amount of sodium in each serving. Watch out for high-sodium ingredients. These include MSG (monosodium glutamate), baking soda, and sodium phosphate.   Eating less salt  Give yourself time to get used to eating less salt. It may take a little while. Here are some tips to help:  · Take the saltshaker off the table. Replace it with salt-free herb mixes and spices.  · Eat fresh or plain frozen vegetables. These have much less salt than canned vegetables.  · Choose low-sodium snacks like sodium-free pretzels, crackers, or air-popped popcorn.  · Dont add salt to your food when youre cooking. Instead, season your foods with pepper, lemon, garlic, or onion.  · When you eat out, ask that your food be cooked without added salt.  · Avoid eating fried foods as these often have a great deal of salt.  If youre told to limit fluids  You may need to limit how much fluid you have to help prevent swelling. This includes anything that is liquid at room temperature, such as ice cream and soup. If your doctor tells you to limit fluid, try these tips:  · Measure drinks in a measuring cup before you drink them. This will help you meet daily goals.  · Chill drinks to make them more refreshing.  · Suck on  frozen lemon wedges to quench thirst.  · Only drink when youre thirsty.  · Chew sugarless gum or suck on hard candy to keep your mouth moist.  · Weigh yourself daily to know if your body's fluid content is rising.  My sodium goal  Your healthcare provider may give you a sodium goal to meet each day. This includes sodium found in food as well as salt that you add. My goal is to eat no more than ___________ mg of sodium per day.     When to call your doctor  Call your doctor right away if you have any symptoms of worsening heart failure. These can include:  · Sudden weight gain  · Increased swelling of your legs or ankles  · Trouble breathing when youre resting or at night  · Increase in the number of pillows you have to sleep on  · Chest pain, pressure, discomfort, or pain in the jaw, neck, or back   Date Last Reviewed: 3/21/2016  © 6197-9652 [employer]. [address] [place] [address], [place] [address]. All rights reserved. This information is not intended as a substitute for professional medical care. Always follow your healthcare professional's instructions.              Heart Failure: Medicines to Help Your Heart    You have a condition called heart failure (also known as congestive heart failure, or CHF). Your doctor will likely prescribe medicines for heart failure and any underlying health problems you have. Most heart failure patients take one or more types of medicinen. Your healthcare provider will work to find the combination of medicines that works best for you.  Heart failure medicines  Here are the most common heart failure medicines:  · ACE inhibitors lower blood pressure and decrease strain on the heart. This makes it easier for the heart to pump. Angiotensin receptor blockers have similar effects. These are prescribed for some patients instead of ACE inhibitors.  · Beta-blockers relieve stress on the heart. They also improve symptoms. They may also improve the heart's pumping action over  time.  · Diuretics (also called water pills) help rid your body of excess water. This can help rid your body of swelling (edema). Having less fluid to pump means your heart doesnt have to work as hard. Some diuretics make your body lose a mineral called potassium. Your doctor will tell you if you need to take supplements or eat more foods high in potassium.  · Digoxin helps your heart pump with more strength. This helps your heart pump more blood with each beat. So, more oxygen-rich blood travels to the rest of the body.  · Aldosterone antagonists help alter hormones and decrease strain on the heart.  · Hydralazine and nitrates are two separate medicines used together to treat heart failure. They may come in one combination pill. They lower blood pressure and decrease how hard the heart has to pump.  Medicines for related conditions  Controlling other heart problems helps keep heart failure under control, too. Depending on other heart problems you have, medicines may be prescribed to:  · Lower blood pressure (antihypertensives).  · Lower cholesterol levels (statins).  · Prevent blood clots (anticoagulants or aspirin).  · Keep the heartbeat steady (antiarrhythmics).  Date Last Reviewed: 3/5/2016  © 8412-4511 [employer]. [address] [place] [address]. All rights reserved. This information is not intended as a substitute for professional medical care. Always follow your healthcare professional's instructions.              Heart Failure: Procedures That May Help    The heart is a muscle that pumps oxygen-rich blood to all parts of the body. When you have heart failure, the heart is not able to pump as well as it should. Blood and fluid may back up into the lungs (congestive heart failure), and some parts of the body dont get enough oxygen-rich blood to work normally. These problems lead to the symptoms of heart failure.     Certain procedures may help the heart pump better in some cases  of heart failure. Some procedures are done to treat health problems that may have caused the heart failure such as coronary artery disease or heart rhythm problems. For more serious heart failure, other options are available.  Treating artery and valve problems  If you have coronary artery disease or valve disease, procedures may be done to improve blood flow. This helps the heart pump better, which can improve heart failure symptoms. First, your doctor may do a cardiac catheterization to help detect clogged blood vessels or valve damage. During this procedure, a  thin tube (catheter) in inserted into a blood vessel and guided to the heart. There a dye is injected and a special type of X-ray (angiogram) is taken of the blood vessels. Procedures to open a blocked artery or fix damaged valves can also be done using catheterization.  · Angioplasty uses a balloon-tipped instrument at the end of the catheter. The balloon is inflated to widen the narrowed artery. In many cases, a stent is expanded to further support the narrowed artery. A stent is a metal mesh tube.  · Valve surgery repairs or replacement of faulty valves can also be done during catheterization so blood can flow properly through the chambers of the heart.  Bypass surgery is another option to help treat blocked arteries. It uses a healthy blood vessel from elsewhere in the body. The healthy blood vessel is attached above and below the blocked area so that blood can flow around the blocked artery.  Treating heart rhythm problems  A device may be placed in the chest to help a weak heart maintain a healthy, heartbeat so the heart can pump more effectively:  · Pacemaker. A pacemaker is an implanted device that regulates your heartbeat electronically. It monitors your heart's rhythm and generates a painless electric impulse that helps the heart beat in a regular rhythm. A pacemaker is programmed to meet your specific heart rhythm needs.  · Biventricular  pacing/cardiac resynchronization therapy. A type of pacemaker that paces both pumping chambers of the heart at the same time to coordinate contractions and to improve the heart's function. Some people with heart failure are candidates for this therapy.  · Implantable cardioverter defibrillator. A device similar to a pacemaker that senses when the heart is beating too fast and delivers an electrical shock to convert the fast rhythm to a normal rhythm. This can be a life saving device.  In severe cases  In more serious cases of heart failure when other treatments no longer work, other options may include:  · Ventricular assist devices (VADs). These are mechanical devices used to take over the pumping function for one or both of the heart's ventricles, or pumping chambers. A VAD may be necessary when heart failure progresses to the point that medicines and other treatments no longer help. In some cases, a VAD may be used as a bridge to transplant.  · Heart transplant. This is replacing the diseased heart with a healthy one from a donor. This is an option for a few people who are very sick. A heart transplant is very serious and not an option for all patients. Your doctor can tell you more.  Date Last Reviewed: 3/20/2016  © 2616-8124 [employer]. [address] [place] [address]. All rights reserved. This information is not intended as a substitute for professional medical care. Always follow your healthcare professional's instructions.              Heart Failure: Tracking Your Weight  You have a condition called heart failure. When you have heart failure, a sudden weight gain or a steady rise in weight is a warning sign that your body is retaining too much water and salt. This could mean your heart failure is getting worse. If left untreated, it can cause problems for your lungs and result in shortness of breath. Weighing yourself each day is the best way to know if youre retaining water. If  your weight goes up quickly, call your doctor. You will be given instructions on how to get rid of the excess water. You will likely need medicines and to avoid salt. This will help your heart work better.  Call your doctor if you gain more than 2 pounds in 1 day, more than 5 pounds in 1 week, or whatever weight gain you were told to report by your doctor. This is often a sign of worsening heart failure and needs to be evaluated and treated. Your doctor will tell you what to do next.   Tips for weighing yourself    · Weigh yourself at the same time each morning, wearing the same clothes. Weigh yourself after urinating and before eating.  · Use the same scale each day. Make sure the numbers are easy to read. Put the scale on a flat, hard surface -- not on a rug or carpet.  · Do not stop weighing yourself. If you forget one day, weigh again the next morning.  How to use your weight chart  · Keep your weight chart near the scale. Write your weight on the chart as soon as you get off the scale.  · Fill in the month and the start date on the chart. Then write down your weight each day. Your chart will look like this:    · If you miss a day, leave the space blank. Weigh yourself the next day and write your weight in the next space.  · Take your weight chart with you when you go to see your doctor.  Date Last Reviewed: 3/20/2016  © 5050-1982 [employer]. [address] [place] [address]. All rights reserved. This information is not intended as a substitute for professional medical care. Always follow your healthcare professional's instructions.              Heart Failure: Warning Signs of a Flare-Up  You have a condition called heart failure. Once you have heart failure, flare-ups can happen. Below are signs that can mean your heart failure is getting worse. If you notice any of these warning signs, call your healthcare provider.  Swelling    · Your feet, ankles, or lower legs get puffier.  · You  notice skin changes on your lower legs.  · Your shoes feel too tight.  · Your clothes are tighter in the waist.  · You have trouble getting rings on or off your fingers.  Shortness of breath  · You have to breathe harder even when youre doing your normal activities or when youre resting.  · You are short of breath walking up stairs or even short distances.  · You wake up at night short of breath or coughing.  · You need to use more pillows or sit up to sleep.  · You wake up tired or restless.  Other warning signs  · You feel weaker, dizzy, or more tired.  · You have chest pain or changes in your heartbeat.  · You have a cough that wont go away.  · You cant remember things or dont feel like eating.  Tracking your weight  Gaining weight is often the first warning sign that heart failure is getting worse. Gaining even a few pounds can be a sign that your body is retaining excess water and salt. Weighing yourself each day in the morning after you urinate and before you eat, is the best way to know if you're retaining water. Get a scale that is easy to read and make sure you wear the same clothes and use the same scale every time you weigh. Your healthcare provider will show you how to track your weight. Call your doctor if you gain more than 2 pounds in 1 day, 5 pounds in 1 week, or whatever weight gain you were told to report by your doctor. This is often a sign of worsening heart failure and needs to be evaluated and treated before it compromises your breathing. Your doctor will tell you what to do next.    Date Last Reviewed: 3/15/2016  © 7036-6085 [employer]. [address] [place] [address]. All rights reserved. This information is not intended as a substitute for professional medical care. Always follow your healthcare professional's instructions.              Pneumonmia Discharge Instructions                Coumadin Discharge Instructions                         Diabetes Discharge  Instructions                                    [facility] complies with applicable Federal civil rights laws and does not discriminate on the basis of race, color, national origin, age, disability, or sex.

## 2017-04-05 NOTE — ASSESSMENT & PLAN NOTE
With chronic anticoagulation with Coumadin-  Telemetry  Continue home coumadin  Daily INR- INR today at 2.2

## 2017-04-05 NOTE — TELEPHONE ENCOUNTER
Received message from patient requesting to speak with a nurse. Call placed to patient who states she was able to get all of her medications and she spoke with a . Writer asked patient if she needed office to do anything to assist her, patient stated not at this time. Encouraged patient to reach out to office if anything further is needed. Patient verbalized understanding.

## 2017-04-06 ENCOUNTER — OUTPATIENT CASE MANAGEMENT (OUTPATIENT)
Dept: ADMINISTRATIVE | Facility: OTHER | Age: 67
End: 2017-04-06

## 2017-04-06 ENCOUNTER — TELEPHONE (OUTPATIENT)
Dept: SMOKING CESSATION | Facility: CLINIC | Age: 67
End: 2017-04-06

## 2017-04-06 DIAGNOSIS — E11.9 TYPE 2 DIABETES MELLITUS WITHOUT COMPLICATION: ICD-10-CM

## 2017-04-06 PROBLEM — D50.9 MICROCYTIC ANEMIA: Status: ACTIVE | Noted: 2017-04-06

## 2017-04-06 PROBLEM — E83.39 HYPOPHOSPHATEMIA: Status: ACTIVE | Noted: 2017-04-06

## 2017-04-06 PROBLEM — E87.6 HYPOKALEMIA: Status: ACTIVE | Noted: 2017-04-06

## 2017-04-06 LAB
ANION GAP SERPL CALC-SCNC: 9 MMOL/L
BASOPHILS # BLD AUTO: 0.1 K/UL
BASOPHILS NFR BLD: 0.9 %
BILIRUB UR QL STRIP: NEGATIVE
BUN SERPL-MCNC: 11 MG/DL
CALCIUM SERPL-MCNC: 8.5 MG/DL
CHLORIDE SERPL-SCNC: 99 MMOL/L
CLARITY UR: CLEAR
CO2 SERPL-SCNC: 31 MMOL/L
COLOR UR: YELLOW
CREAT SERPL-MCNC: 0.6 MG/DL
DIFFERENTIAL METHOD: ABNORMAL
EOSINOPHIL # BLD AUTO: 0 K/UL
EOSINOPHIL NFR BLD: 0.2 %
ERYTHROCYTE [DISTWIDTH] IN BLOOD BY AUTOMATED COUNT: 17 %
EST. GFR  (AFRICAN AMERICAN): >60 ML/MIN/1.73 M^2
EST. GFR  (NON AFRICAN AMERICAN): >60 ML/MIN/1.73 M^2
ESTIMATED AVG GLUCOSE: 140 MG/DL
GLUCOSE SERPL-MCNC: 129 MG/DL
GLUCOSE UR QL STRIP: NEGATIVE
HBA1C MFR BLD HPLC: 6.5 %
HCT VFR BLD AUTO: 31.5 %
HGB BLD-MCNC: 9.7 G/DL
HGB UR QL STRIP: NEGATIVE
INR PPP: 1.6
KETONES UR QL STRIP: NEGATIVE
LEUKOCYTE ESTERASE UR QL STRIP: NEGATIVE
LYMPHOCYTES # BLD AUTO: 2.3 K/UL
LYMPHOCYTES NFR BLD: 20.4 %
MAGNESIUM SERPL-MCNC: 2.1 MG/DL
MCH RBC QN AUTO: 25 PG
MCHC RBC AUTO-ENTMCNC: 30.9 %
MCV RBC AUTO: 81 FL
MONOCYTES # BLD AUTO: 1.6 K/UL
MONOCYTES NFR BLD: 13.9 %
NEUTROPHILS # BLD AUTO: 7.3 K/UL
NEUTROPHILS NFR BLD: 64.6 %
NITRITE UR QL STRIP: NEGATIVE
PH UR STRIP: 6 [PH] (ref 5–8)
PHOSPHATE SERPL-MCNC: 2.1 MG/DL
PLATELET # BLD AUTO: 314 K/UL
PMV BLD AUTO: 8.4 FL
POCT GLUCOSE: 113 MG/DL (ref 70–110)
POCT GLUCOSE: 134 MG/DL (ref 70–110)
POCT GLUCOSE: 144 MG/DL (ref 70–110)
POCT GLUCOSE: 152 MG/DL (ref 70–110)
POCT GLUCOSE: 178 MG/DL (ref 70–110)
POTASSIUM SERPL-SCNC: 3.4 MMOL/L
PROT UR QL STRIP: NEGATIVE
PROTHROMBIN TIME: 16.3 SEC
RBC # BLD AUTO: 3.89 M/UL
SODIUM SERPL-SCNC: 139 MMOL/L
SP GR UR STRIP: <=1.005 (ref 1–1.03)
URN SPEC COLLECT METH UR: ABNORMAL
UROBILINOGEN UR STRIP-ACNC: NEGATIVE EU/DL
VANCOMYCIN TROUGH SERPL-MCNC: 17.3 UG/ML
WBC # BLD AUTO: 11.3 K/UL

## 2017-04-06 PROCEDURE — 83735 ASSAY OF MAGNESIUM: CPT

## 2017-04-06 PROCEDURE — 84100 ASSAY OF PHOSPHORUS: CPT

## 2017-04-06 PROCEDURE — 25000242 PHARM REV CODE 250 ALT 637 W/ HCPCS: Performed by: NURSE PRACTITIONER

## 2017-04-06 PROCEDURE — 99223 1ST HOSP IP/OBS HIGH 75: CPT | Mod: ,,, | Performed by: SURGERY

## 2017-04-06 PROCEDURE — 25000003 PHARM REV CODE 250: Performed by: HOSPITALIST

## 2017-04-06 PROCEDURE — 25000003 PHARM REV CODE 250: Performed by: NURSE PRACTITIONER

## 2017-04-06 PROCEDURE — 97165 OT EVAL LOW COMPLEX 30 MIN: CPT

## 2017-04-06 PROCEDURE — 94640 AIRWAY INHALATION TREATMENT: CPT

## 2017-04-06 PROCEDURE — 63600175 PHARM REV CODE 636 W HCPCS: Performed by: NURSE PRACTITIONER

## 2017-04-06 PROCEDURE — 85610 PROTHROMBIN TIME: CPT

## 2017-04-06 PROCEDURE — 25000003 PHARM REV CODE 250: Performed by: EMERGENCY MEDICINE

## 2017-04-06 PROCEDURE — 97162 PT EVAL MOD COMPLEX 30 MIN: CPT

## 2017-04-06 PROCEDURE — 63600175 PHARM REV CODE 636 W HCPCS: Performed by: HOSPITALIST

## 2017-04-06 PROCEDURE — 27000221 HC OXYGEN, UP TO 24 HOURS

## 2017-04-06 PROCEDURE — 25000003 PHARM REV CODE 250: Performed by: INTERNAL MEDICINE

## 2017-04-06 PROCEDURE — 85025 COMPLETE CBC W/AUTO DIFF WBC: CPT

## 2017-04-06 PROCEDURE — 87086 URINE CULTURE/COLONY COUNT: CPT

## 2017-04-06 PROCEDURE — 80202 ASSAY OF VANCOMYCIN: CPT

## 2017-04-06 PROCEDURE — 63600175 PHARM REV CODE 636 W HCPCS: Performed by: INTERNAL MEDICINE

## 2017-04-06 PROCEDURE — 80048 BASIC METABOLIC PNL TOTAL CA: CPT

## 2017-04-06 PROCEDURE — 11000001 HC ACUTE MED/SURG PRIVATE ROOM

## 2017-04-06 PROCEDURE — 36415 COLL VENOUS BLD VENIPUNCTURE: CPT

## 2017-04-06 PROCEDURE — 94761 N-INVAS EAR/PLS OXIMETRY MLT: CPT

## 2017-04-06 PROCEDURE — 81003 URINALYSIS AUTO W/O SCOPE: CPT

## 2017-04-06 RX ORDER — MICONAZOLE NITRATE 2 %
POWDER (GRAM) TOPICAL 2 TIMES DAILY
Status: DISCONTINUED | OUTPATIENT
Start: 2017-04-06 | End: 2017-04-11 | Stop reason: HOSPADM

## 2017-04-06 RX ORDER — GABAPENTIN 300 MG/1
600 CAPSULE ORAL 3 TIMES DAILY
Status: DISCONTINUED | OUTPATIENT
Start: 2017-04-06 | End: 2017-04-11 | Stop reason: HOSPADM

## 2017-04-06 RX ORDER — FLUCONAZOLE 100 MG/1
200 TABLET ORAL
Status: DISCONTINUED | OUTPATIENT
Start: 2017-04-06 | End: 2017-04-11 | Stop reason: HOSPADM

## 2017-04-06 RX ORDER — POTASSIUM CHLORIDE 20 MEQ/1
40 TABLET, EXTENDED RELEASE ORAL ONCE
Status: COMPLETED | OUTPATIENT
Start: 2017-04-06 | End: 2017-04-06

## 2017-04-06 RX ORDER — SODIUM,POTASSIUM PHOSPHATES 280-250MG
1 POWDER IN PACKET (EA) ORAL
Status: DISPENSED | OUTPATIENT
Start: 2017-04-06 | End: 2017-04-08

## 2017-04-06 RX ORDER — CEFAZOLIN SODIUM 2 G/50ML
2 SOLUTION INTRAVENOUS
Status: DISCONTINUED | OUTPATIENT
Start: 2017-04-06 | End: 2017-04-10

## 2017-04-06 RX ORDER — ENOXAPARIN SODIUM 100 MG/ML
1 INJECTION SUBCUTANEOUS
Status: DISCONTINUED | OUTPATIENT
Start: 2017-04-06 | End: 2017-04-07

## 2017-04-06 RX ORDER — ZINC SULFATE 50(220)MG
220 CAPSULE ORAL DAILY
Status: DISCONTINUED | OUTPATIENT
Start: 2017-04-07 | End: 2017-04-11 | Stop reason: HOSPADM

## 2017-04-06 RX ADMIN — Medication 400 MG: at 09:04

## 2017-04-06 RX ADMIN — IPRATROPIUM BROMIDE AND ALBUTEROL SULFATE 3 ML: .5; 3 SOLUTION RESPIRATORY (INHALATION) at 08:04

## 2017-04-06 RX ADMIN — VANCOMYCIN HYDROCHLORIDE 1500 MG: 1 INJECTION, POWDER, LYOPHILIZED, FOR SOLUTION INTRAVENOUS at 10:04

## 2017-04-06 RX ADMIN — VANCOMYCIN HYDROCHLORIDE 1500 MG: 1 INJECTION, POWDER, LYOPHILIZED, FOR SOLUTION INTRAVENOUS at 06:04

## 2017-04-06 RX ADMIN — THERA TABS 1 TABLET: TAB at 08:04

## 2017-04-06 RX ADMIN — METFORMIN HYDROCHLORIDE 500 MG: 500 TABLET, FILM COATED ORAL at 08:04

## 2017-04-06 RX ADMIN — Medication 400 MG: at 08:04

## 2017-04-06 RX ADMIN — HYDROCODONE BITARTRATE AND ACETAMINOPHEN 1 TABLET: 10; 325 TABLET ORAL at 06:04

## 2017-04-06 RX ADMIN — HYDROCODONE BITARTRATE AND ACETAMINOPHEN 1 TABLET: 10; 325 TABLET ORAL at 01:04

## 2017-04-06 RX ADMIN — OXYCODONE HYDROCHLORIDE AND ACETAMINOPHEN 500 MG: 500 TABLET ORAL at 09:04

## 2017-04-06 RX ADMIN — BUDESONIDE 0.5 MG: 0.5 INHALANT RESPIRATORY (INHALATION) at 08:04

## 2017-04-06 RX ADMIN — IPRATROPIUM BROMIDE AND ALBUTEROL SULFATE 3 ML: .5; 3 SOLUTION RESPIRATORY (INHALATION) at 03:04

## 2017-04-06 RX ADMIN — ENOXAPARIN SODIUM 140 MG: 100 INJECTION SUBCUTANEOUS at 06:04

## 2017-04-06 RX ADMIN — HYDROCODONE BITARTRATE AND ACETAMINOPHEN 1 TABLET: 10; 325 TABLET ORAL at 08:04

## 2017-04-06 RX ADMIN — RAMELTEON 8 MG: 8 TABLET, FILM COATED ORAL at 09:04

## 2017-04-06 RX ADMIN — GABAPENTIN 600 MG: 300 CAPSULE ORAL at 03:04

## 2017-04-06 RX ADMIN — Medication: at 12:04

## 2017-04-06 RX ADMIN — CLONAZEPAM 1 MG: 1 TABLET ORAL at 08:04

## 2017-04-06 RX ADMIN — FLUCONAZOLE 200 MG: 100 TABLET ORAL at 06:04

## 2017-04-06 RX ADMIN — METOPROLOL SUCCINATE 12.5 MG: 25 TABLET, EXTENDED RELEASE ORAL at 08:04

## 2017-04-06 RX ADMIN — VANCOMYCIN HYDROCHLORIDE 1500 MG: 1 INJECTION, POWDER, LYOPHILIZED, FOR SOLUTION INTRAVENOUS at 02:04

## 2017-04-06 RX ADMIN — PIPERACILLIN AND TAZOBACTAM 4.5 G: 4; .5 INJECTION, POWDER, LYOPHILIZED, FOR SOLUTION INTRAVENOUS; PARENTERAL at 01:04

## 2017-04-06 RX ADMIN — POTASSIUM CHLORIDE 20 MEQ: 20 TABLET, EXTENDED RELEASE ORAL at 08:04

## 2017-04-06 RX ADMIN — CEFAZOLIN SODIUM 2 G: 2 SOLUTION INTRAVENOUS at 10:04

## 2017-04-06 RX ADMIN — IPRATROPIUM BROMIDE AND ALBUTEROL SULFATE 3 ML: .5; 3 SOLUTION RESPIRATORY (INHALATION) at 04:04

## 2017-04-06 RX ADMIN — CLONAZEPAM 1 MG: 1 TABLET ORAL at 09:04

## 2017-04-06 RX ADMIN — POLYETHYLENE GLYCOL (3350) 17 G: 17 POWDER, FOR SOLUTION ORAL at 08:04

## 2017-04-06 RX ADMIN — METFORMIN HYDROCHLORIDE 500 MG: 500 TABLET, FILM COATED ORAL at 06:04

## 2017-04-06 RX ADMIN — ATORVASTATIN CALCIUM 20 MG: 20 TABLET, FILM COATED ORAL at 08:04

## 2017-04-06 RX ADMIN — GABAPENTIN 600 MG: 300 CAPSULE ORAL at 05:04

## 2017-04-06 RX ADMIN — FUROSEMIDE 40 MG: 40 TABLET ORAL at 06:04

## 2017-04-06 RX ADMIN — POTASSIUM & SODIUM PHOSPHATES POWDER PACK 280-160-250 MG 1 PACKET: 280-160-250 PACK at 09:04

## 2017-04-06 RX ADMIN — GABAPENTIN 600 MG: 300 CAPSULE ORAL at 09:04

## 2017-04-06 RX ADMIN — FUROSEMIDE 40 MG: 40 TABLET ORAL at 08:04

## 2017-04-06 RX ADMIN — POTASSIUM CHLORIDE 40 MEQ: 1500 TABLET, EXTENDED RELEASE ORAL at 10:04

## 2017-04-06 RX ADMIN — IPRATROPIUM BROMIDE AND ALBUTEROL SULFATE 3 ML: .5; 3 SOLUTION RESPIRATORY (INHALATION) at 12:04

## 2017-04-06 RX ADMIN — PIPERACILLIN AND TAZOBACTAM 4.5 G: 4; .5 INJECTION, POWDER, LYOPHILIZED, FOR SOLUTION INTRAVENOUS; PARENTERAL at 12:04

## 2017-04-06 RX ADMIN — PANTOPRAZOLE SODIUM 40 MG: 40 TABLET, DELAYED RELEASE ORAL at 08:04

## 2017-04-06 NOTE — PLAN OF CARE
Problem: Occupational Therapy Goal  Goal: Occupational Therapy Goal  Goals to be met by: 4/16/17     Patient will increase functional independence with ADLs by performing:    UE Dressing with Modified [place].  LE Dressing with Set-up Assistance, Minimal Assistance and Assistive Devices as needed.  Grooming while standing at sink with Supervision.  Toileting from bedside commode with Modified [place] and Assistive Devices as needed for hygiene and clothing management.   Toilet transfer to bedside commode with Modified [place].  Pt to utilize energy conservation techniques during ADL task performance with occasional cues.     Outcome: Ongoing (interventions implemented as appropriate)  OT evaluation completed today. Goals & care plan established.     MARIA L Taylor  4/6/2017

## 2017-04-06 NOTE — PROGRESS NOTES
Cm completed the assessment with patient.  Pt sitting up in bed.  Pt arrived from home, she lives with her daughter.  Pt mentioned that Dr. Bird was trying to make her daughter the Care giver for patient.  Pt wanted information on it.  Cm will follow-up.  PCP is Dr. Bird.  Pt needs assistance at home- daughter provides the assistance as needed.  Pt does not drive, daughter drives pt to appointments and activities as needed. Pt is on home oxygen, the other dme is listed in graft assessment.  Pt is on Coumadin and HH nurse draws labs.  Disposition:  Pt will discharge to home.  Pt will benefit with Resumption of HH.  No further needs at this time.  Cm will follow-up.       04/06/17 1332   Discharge Assessment   Assessment Type Discharge Planning Assessment   Confirmed/corrected address and phone number on facesheet? Yes   Assessment information obtained from? Patient  (Lives with daughter)   Type of Healthcare Directive Received Durable power of  for health care;Living will  (daughter has LOIS Whiteed - [phone])   Prior to hospitilization cognitive status: Alert/Oriented   Prior to hospitalization functional status: Independent;Needs Assistance   Current cognitive status: Alert/Oriented   Current Functional Status: Needs Assistance;Independent   Arrived From admitted as an inpatient;home or self-care;home health   Lives With child(bonny), adult;grandchild(bonny)   Able to Return to Prior Arrangements yes   Is patient able to care for self after discharge? No   Does the patient have family/friends to help with healtcare needs after discharge? yes   How many people do you have in your home that can help with your care after discharge? 1   Who are your caregiver(s) and their phone number(s)? daughter - Shelia - [phone]   Patient's perception of discharge disposition home or selfcare;home health   Readmission Within The Last 30 Days current reason for admission unrelated to previous admission  (Admited for  ulcer to lt side/pain- per patient)   Patient currently being followed by outpatient case management? Yes   If yes, name of outpatient case management following: insurance company assigned oupatient case management   Patient currently receives home health services? Yes   Patient previously received home health services and would like to resume services if necessary? Yes   If yes, name of home health provider: [facility]   Does the patient currently use HME? Yes   Patient currently receives private duty nursing? N/A   Patient currently receives any other outside agency services? No   Equipment Currently Used at Home glucometer;cane, straight;rollator;oxygen;bedside commode;power chair  (Nebulizer, blood pressure monitor, pulse ox machine)   Do you have any problems affording any of your prescribed medications? No   Is the patient taking medications as prescribed? yes  ([facility] [address]   Do you have any financial concerns preventing you from receiving the healthcare you need? No  (Insurance verified as [employer] AND [place])   Does the patient have transportation to healthcare appointments? Yes   Transportation Available family or friend will provide   On Dialysis? No   Does the patient receive services at the Coumadin Clinic? Yes  ( Nurse draws blood for INR)   Are there any open cases? No   Discharge Plan A Home with family;Home Health   Discharge Plan B Home with family;Home Health   Patient/Family In Agreement With Plan yes

## 2017-04-06 NOTE — CONSULTS
Nelly Tian [id number] is a 66 y.o. female who has been consulted for vancomycin dosing.    The patient has the following labs:     Date Creatinine (mg/dl)    BUN WBC Count   4/5/2017 Estimated Creatinine Clearance: 120.3 mL/min (based on Cr of 0.7). Lab Results   Component Value Date    BUN 10 04/05/2017     Lab Results   Component Value Date    WBC 16.30 (H) 04/05/2017        Current weight is (!) 142 kg (313 lb)    The patient received  2000 mg on 04/05 at 1635 (if pt has already received first dose including doses in ED).    The patient will be started on vancomycin at a dose of 1500 mg every 8 hours (11 mg/kg/dose).  The vancomycin trough has been ordered for 04/06 at 1630.  Target trough goal is 15 to 20 mg/dL for sepsis.    Patient will be followed by pharmacy for changes in renal function, toxicity, and efficacy.   Thank you for allowing us to participate in this patient's care.     Ambrocio Guillen, PharmD

## 2017-04-06 NOTE — ASSESSMENT & PLAN NOTE
Infected abdominal wound-  Monitor closely  ID following- Currently on ancef, vancomycin, and diflucan  BC x 2 pending  Continue wound care   Surgeon consult for possible debridement pending

## 2017-04-06 NOTE — ED NOTES
Upon transfer to pcu, patient is AAOx4, no cardiac or respiratory complications. No needs or questions at this time.

## 2017-04-06 NOTE — ASSESSMENT & PLAN NOTE
With chronic anticoagulation with Coumadin-  Telemetry  Home coumadin on hold in case pt for debridement of abdominal wound by surgeon  Add full dose lovenox

## 2017-04-06 NOTE — PROGRESS NOTES
04/06/17-Patient admitted to hospital for sepsis. Will follow up with JUANITA Mcclellan inpatient case manger for update on patient and discharge plan. JUANITA Mcclellan sent a message back that she will update me on patient when she is going to discharge and discharge plan.

## 2017-04-06 NOTE — CONSULTS
Nelly Tian [id number] is a 66 y.o. female who has been consulted for vancomycin dosing.    The patient has the following labs:     Date Creatinine (mg/dl)    BUN WBC Count   4/6/2017 Estimated Creatinine Clearance: 140.3 mL/min (based on Cr of 0.6). Lab Results   Component Value Date    BUN 11 04/06/2017     Lab Results   Component Value Date    WBC 11.30 04/06/2017        Current weight is (!) 142 kg (313 lb)    Pt is receiving vancomycin 1500 mg every 8 hours.  Vancomycin trough from 04/06 at 1740 was 17.3 mg/dL.  Target trough range is 15-20 mg/dL.   Trough was drawn on time and anticipate it is therapeutic. Pharmacy will continue current regimen due to renal function is improving.  A vancomycin trough has been ordered prior to 4th dose due 04/07 at 1730.      Patient will be followed by pharmacy for changes in renal function, toxicity, and efficacy.  Thank you for allowing us to participate in this patient's care.     Michael KongD

## 2017-04-06 NOTE — PLAN OF CARE
Chronic skin integrity impairment left abdomen. Surgeon has been consulted.  Nursing recommendations written.

## 2017-04-06 NOTE — PROGRESS NOTES
[facility] Medicine  Progress Note    Patient Name: Nelly Tian  MRN: [id number]  Patient Class: IP- Inpatient   Admission Date: 2017  Length of Stay: 1 days  Attending Physician: Micki Davies MD  Primary Care Provider: Constantine Bird MD        Subjective:     Principal Problem:Sepsis    HPI:  The patient is a 66 y.o. female with hx of a chronic left abdominal wound, who is presenting with acute onset fever of 103 PTA  and dyspnea which began today. Pt was sent to ER by [facility] home health nurse. Pt reports she does not feel well, and has no other associated complaints. She was recently hospitalized for her left sided abdominal wound and finished a round of Augmentin. Pt reports her granddaughter was sick with a cough recently. There are no other complaints at this time. She has a past medical history of *Atrial flutter; Anxiety; Arthritis; Asthma; Atrial fibrillation; Back pain; Cataract; CHF (congestive heart failure); COPD (chronic obstructive pulmonary disease); Depression; Diabetes mellitus; Emphysema of lung; Heart failure; Hernia; Hypercapnic respiratory failure, chronic (2016); Hyperlipidemia; Hypertension; Iron deficiency anemia (2/3/2016); Myocardial infarction; Obesity; Peripheral vascular disease; Pneumonia; Polyneuropathy; Retinal detachment; Skin ulcer (3/18/2017); Tobacco dependence; and Type II 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, not stated as uncontrolled. She has a past surgical history that includes  section; Oophorectomy; Retinal detachment surgery; Cataract extraction (Left); Eye surgery; Hysterectomy; and Tonsillectomy. Pt was evaluated in ER and found to have infected abdominal wound with cellulitis and sepsis and admitted for further evaluation and treatment.    Hospital Course:  Pt with mild improvement to cellulitis of abdomen but still significant. ID & Surgical consult pending. Continue current  treatment. Pt/OT consulted for debility.     Interval History:  Continue current treatment.     Review of Systems   Constitutional: Positive for activity change and fatigue. Negative for chills and fever.   HENT: Negative for ear pain and hearing loss.    Eyes: Negative for pain and redness.   Respiratory: Positive for cough and shortness of breath. Negative for apnea, choking, chest tightness and stridor.    Cardiovascular: Positive for leg swelling. Negative for chest pain and palpitations.   Gastrointestinal: Positive for abdominal pain. Negative for blood in stool, constipation, diarrhea, nausea and vomiting.   Endocrine: Negative for polydipsia and polyphagia.   Genitourinary: Negative for difficulty urinating, dysuria, flank pain and hematuria.   Musculoskeletal: Positive for arthralgias and gait problem. Negative for neck pain and neck stiffness.   Skin: Positive for color change and wound.        Abdominal wound to left abdomen with ulcerated areas with significant surrounding cellulitis   Allergic/Immunologic: Negative for food allergies.   Neurological: Positive for weakness. Negative for syncope and speech difficulty.   Hematological: Bruises/bleeds easily.   Psychiatric/Behavioral: Negative for confusion, decreased concentration and suicidal ideas.     Objective:     Vital Signs (Most Recent):  Temp: 98.3 °F (36.8 °C) (04/06/17 1700)  Pulse: 66 (04/06/17 1700)  Resp: 18 (04/06/17 1700)  BP: (!) 111/53 (04/06/17 1700)  SpO2: 100 % (04/06/17 1700) Vital Signs (24h Range):  Temp:  [97.9 °F (36.6 °C)-98.5 °F (36.9 °C)] 98.3 °F (36.8 °C)  Pulse:  [63-78] 66  Resp:  [14-18] 18  SpO2:  [92 %-100 %] 100 %  BP: ()/() 111/53     Weight: (!) 142 kg (313 lb)  Body mass index is 47.59 kg/(m^2).    Intake/Output Summary (Last 24 hours) at 04/06/17 1708  Last data filed at 04/06/17 0721   Gross per 24 hour   Intake              350 ml   Output              900 ml   Net             -550 ml      Physical Exam    Constitutional: She is oriented to person, place, and time. She appears well-developed. No distress.   Morbidly obese   HENT:   Head: Normocephalic and atraumatic.   Eyes: Conjunctivae and EOM are normal. Pupils are equal, round, and reactive to light. Right eye exhibits no discharge. Left eye exhibits no discharge.   Neck: Normal range of motion. Neck supple.   Cardiovascular: Normal rate, regular rhythm and intact distal pulses.    Pulmonary/Chest: Effort normal. No stridor. No respiratory distress.   BBS diminished   Abdominal: Soft. Bowel sounds are normal. There is tenderness. There is no rebound and no guarding.   Tenderness surrounding abdominal wound   Genitourinary:   Genitourinary Comments: Not examined   Musculoskeletal: Normal range of motion. She exhibits no edema.   Neurological: She is alert and oriented to person, place, and time. No cranial nerve deficit.   Skin: Skin is warm and dry. She is not diaphoretic. There is erythema.   Abdominal wound to left abdomen with ulcerated areas with significant surrounding cellulitis   Psychiatric: She has a normal mood and affect. Her behavior is normal. Judgment and thought content normal.       Significant Labs: Reviewed         Assessment/Plan:      * Sepsis  Infected abdominal wound-  Monitor closely  ID following- Currently on ancef, vancomycin, and diflucan  BC x 2 pending  Continue wound care   Surgeon consult for possible debridement pending    Diabetes mellitus with neuropathy  2000 ADA diet  Accuchecks with correctional SSI  Continue home metformin and gabapentin  Blood sugars running 144-178    Chronic obstructive pulmonary disease with acute exacerbation  Monitor O2 sats  Supplemental O2  Continue pulmicort      Major depression, recurrent, chronic  Continue home escitalopram      GERD (gastroesophageal reflux disease)  Continue home PPI      Chronic atrial fibrillation  With chronic anticoagulation with Coumadin-  Telemetry  Home coumadin on hold  in case pt for debridement of abdominal wound by surgeon  Add full dose lovenox      Hypertension associated with diabetes  Home lisinopril for now  Monitor blood pressure       Morbid obesity with BMI of 45.0-49.9, adult  Encourage increased activity as tolerated and diet compliance      PVD (peripheral vascular disease)  Continue statin          Chronic combined systolic and diastolic congestive heart failure  Telemetry  Stable  Continue home lasix  Supplemental O2 as needed        Hyperlipidemia  Continue home statin      Chronic respiratory failure with hypoxia  As above  Continue home O2      Nonhealing skin ulcer, limited to breakdown of skin        Hypomagnesemia  Monitor  Supplement as needed      Hypophosphatemia  Monitor  Supplement as needed      Hypokalemia  Monitor  Supplement as needed      Microcytic anemia  Continue home MVI and pm vitamin C      VTE Risk Mitigation     None          DARIEN Bobo  Department of Hospital Medicine   [facility]    Time spent seeing patient( greater than 1/2 spent in direct contact) : 38 minutes

## 2017-04-06 NOTE — TELEPHONE ENCOUNTER
I called her today to let her know that I received the prior authorization for the nicoderm CQ patches for tobacco cessation. She will call me back when she gets out of the hospital to get started in the program and schedule follow up appointments.

## 2017-04-06 NOTE — PLAN OF CARE
Problem: Occupational Therapy Goal  Goal: Occupational Therapy Goal  Goals to be met by: 4/16/17     Patient will increase functional independence with ADLs by performing:    Self care tasks with Modified [place]  Pt to utilize energy conservation techniques during ADL task performance with occasional cues.  Outcome: Ongoing (interventions implemented as appropriate)  OT evaluation completed today. Goals & care plan established.     MARIA L Taylor  4/6/2017

## 2017-04-06 NOTE — PROGRESS NOTES
04/06/17 1328   Readmission Questionnaire   At the time of your discharge, did someone talk to you about what your health problems were? Yes   At the time of discharge, did someone talk to you about what to watch out for regarding worsening of your health problem? Yes   At the time of discharge, did someone talk to you about what to do if you experienced worsening of your health problem? Yes   At the time of discharge, did someone talk to you about which medication to take when you left the hospital and which ones to stop taking? Yes   At the time of discharge, did someone talk to you about when and where to follow up with a doctor after you left the hospital? Yes   What do you believe caused you to be sick enough to be re-admitted? Ulcer to left side/pain   How often do you need to have someone help you when you read instructions, pamphlets, or other written material from your doctor or pharmacy? Sometimes   Do you have problems taking your medications as prescribed? No   Do you have any problems affording any of  your prescribed medications? No   Do you have problems obtaining/receiving your medications? No   Does the patient have transportation to healthcare appointments? No  (daughter drives pt to appointments and activities)   Living Arrangements mobile home   Does the patient have family/friends to help with healtcare needs after discharge? yes   Who are your caregiver(s) and their phone number(s)? Maye Candida - [phone]   Does your caregiver provide all the help you need? Yes   Are you currently feeling confused? No   Are you currently having problems thinking? No   Are you currently having memory problems? No   Have you felt down, depressed, or hopeless? 1   Have you felt little interest or pleasure in doing things? 1   In the last 7 days, my sleep quality was: good

## 2017-04-06 NOTE — PLAN OF CARE
Problem: Patient Care Overview  Goal: Plan of Care Review  Outcome: Ongoing (interventions implemented as appropriate)  Pt received on 3 lpm NC. Aerosol Tx given, tolerated well.

## 2017-04-06 NOTE — PT/OT/SLP EVAL
Occupational Therapy  Evaluation    Nelly Tian   MRN: [id number]   Admitting Diagnosis: Sepsis    OT Date of Treatment: 17   OT Start Time: 1445  OT Stop Time: 1502  OT Total Time (min): 17 min    Billable Minutes:  Evaluation 17    Diagnosis: Sepsis   COPD exacerbation, wound infection    Past Medical History:   Diagnosis Date    *Atrial flutter     Anxiety     Arthritis     Asthma     Atrial fibrillation     Back pain     Cataract     OD    CHF (congestive heart failure)     COPD (chronic obstructive pulmonary disease)     Depression     Diabetes mellitus     Emphysema of lung     Heart failure     Hernia     Hypercapnic respiratory failure, chronic 2016    Hyperlipidemia     Hypertension     Iron deficiency anemia 2/3/2016    Myocardial infarction     Obesity     Peripheral vascular disease     Pneumonia     Polyneuropathy     Retinal detachment     OS    Skin ulcer 3/18/2017    Tobacco dependence     Type II 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, not stated as uncontrolled       Past Surgical History:   Procedure Laterality Date    CATARACT EXTRACTION Left     OS      SECTION      x2    EYE SURGERY      HYSTERECTOMY      OOPHORECTOMY      one ovary conserved    RETINAL DETACHMENT SURGERY      buckle --OS    TONSILLECTOMY         Referring physician: TORY Ren  Date referred to OT: 17    General Precautions: Standard, fall  Orthopedic Precautions: N/A  Braces: N/A          Patient History:  Living Environment  Lives With: child(bonny), adult  Living Arrangements: mobile home  Home Accessibility:  (ramp access)  Home Layout: Able to live on 1st floor  Transportation Available: family or friend will provide  Living Environment Comment: Pt lives with daughter in a mobile home with ramp access. Her daughter is able to care for pt at home as needed.  Equipment Currently Used at Home: cane, straight, rollator, raised toilet,  power chair, glucometer, oxygen    Prior level of function:   Bed Mobility/Transfers: needs device  Grooming: needs assist  Bathing: needs assist  Upper Body Dressing: independent  Lower Body Dressing: needs assist  Toileting: needs device  Home Management Skills: needs assist  Homemaking Responsibilities: No  Driving License: No  Occupation: Retired  Type of Occupation:   IADL Comments: Pt was (I) with self feeding &  Mod I with grooming/hygiene, UB dressing and toileting but needed assistance from her daughter with bathing & LB ADLs as well as all IADLs. Pt uses a cane, rollator and at times a power wheelchair for mobility at home.     Dominant hand: right    Subjective:  Communicated with nurse Colleen prior to session.  Stated patient was cleared for OT today.  Chief Complaint: lethargy  Patient/Family stated goals: To cook for myself again    Pain Ratin/10  Location - Side: Left     Location: abdomen  Pain Addressed: Pre-medicate for activity  Pain Rating Post-Intervention: 6/10    Objective:  Patient found with: oxygen, telemetry, peripheral IV in bed with HOB raised    Cognitive Exam:  Oriented to: Person, Place, Time and Situation  Follows Commands/attention: Follows multistep  commands  Communication: clear/fluent  Memory:  No Deficits noted  Safety awareness/insight to disability: intact  Coping skills/emotional control: Appropriate to situation    Visual/perceptual:  Intact    Physical Exam:  Postural examination/scapula alignment: Rounded shoulder  Skin integrity: Wound L abdomen  Edema: None noted     Sensation:   Intact    Upper Extremity Range of Motion:  Right Upper Extremity: WFL  Left Upper Extremity: WFL    Upper Extremity Strength:  Right Upper Extremity: WFL  Left Upper Extremity: WFL   Strength: L hand weak    Fine motor coordination:   Intact    Functional Mobility:  Bed Mobility: Despite encouragement, pt declined all mobility, stating she was just too  "tired.      Activities of Daily Living:  Feeding Level of Assistance: Independent    Grooming Position: other (HOB raised)  Grooming Level of Assistance: Supervision (set-up)     Toileting Level of Assistance:  (Pt stated she is using the BSC independently.)    Therapeutic Activities and Exercises:  OT ed pt on OT role & POC as well as discharge recommendations.  OT educated patient on energy conservation techniques to reduce demand on cardiovascular system. OT focused on activity pacing, work simplification & use of adaptive equipment to reduce fatigue & SOB.      AM-PAC 6 CLICK ADL  How much help from another person does this patient currently need?  1 = Unable, Total/Dependent Assistance  2 = A lot, Maximum/Moderate Assistance  3 = A little, Minimum/Contact Guard/Supervision  4 = None, Modified [place]/Independent    Putting on and taking off regular lower body clothing? : 3  Bathing (including washing, rinsing, drying)?: 3  Toileting, which includes using toilet, bedpan, or urinal? : 3  Putting on and taking off regular upper body clothing?: 3  Taking care of personal grooming such as brushing teeth?: 3  Eating meals?: 4  Total Score: 19    AM-PAC Raw Score CMS "G-Code Modifier Level of Impairment Assistance   6 % Total / Unable   7 - 9 CM 80 - 100% Maximal Assist   10 - 14 CL 60 - 80% Moderate Assist   15 - 19 CK 40 - 60% Moderate Assist   20 - 22 CJ 20 - 40% Minimal Assist   23 CI 1-20% SBA / CGA   24 CH 0% Independent/ Mod I       Patient left HOB elevated with all lines intact and call button in reach    Assessment:  Nelly Tian is a 66 y.o. female with a medical diagnosis of Sepsis and presents with some decline in functional status, impacting ADLs and functional mobility.  Recommend OT treatment to educate pt on energy conservation techniques, maximize endurance, safety & independence with ADLs.      Rehab identified problem list/impairments: Rehab identified problem list/impairments: " impaired endurance, impaired skin, weakness, impaired functional mobilty, pain    Rehab potential is good.    Activity tolerance: Fair    Discharge recommendations: Discharge Facility/Level Of Care Needs: home with home health     Barriers to discharge:      Equipment recommendations:       GOALS:   Occupational Therapy Goals        Problem: Occupational Therapy Goal    Goal Priority Disciplines Outcome Interventions   Occupational Therapy Goal     OT, PT/OT Ongoing (interventions implemented as appropriate)    Description:  Goals to be met by: 4/16/17     Patient will increase functional independence with ADLs by performing:    UE Dressing with Modified [place].  LE Dressing with Set-up Assistance, Minimal Assistance and Assistive Devices as needed.  Grooming while standing at sink with Supervision.  Toileting from bedside commode with Modified [place] and Assistive Devices as needed for hygiene and clothing management.   Toilet transfer to bedside commode with Modified [place].  Pt to utilize energy conservation techniques during ADL task performance with occasional cues.                  PLAN:  Patient to be seen 2 x/week to address the above listed problems via self-care/home management, therapeutic activities, therapeutic exercises  Plan of Care expires: 04/16/17  Plan of Care reviewed with: patient         MARIA L Galo  04/06/2017

## 2017-04-06 NOTE — ASSESSMENT & PLAN NOTE
2000 ADA diet  Accuchecks with correctional SSI  Continue home metformin and gabapentin  Blood sugars running 144-178

## 2017-04-06 NOTE — PLAN OF CARE
Problem: Patient Care Overview  Goal: Plan of Care Review  Outcome: Ongoing (interventions implemented as appropriate)  POC reviewed with pt, verbalized understanding  NAD noted  Vs obtained  Abx infusing as ordered  BG monitored  Urine culture collected and sent to lab, awaiting results  PRN meds given for pain  Alert and oriented  BSC at the bed  Cane at the bedside  Stand-by assist   SR on tele  Consult wound care  Pt obese and has wound on the left side of abdomen , covered with gauze  Free of fall or injury  Bed in lowest position, wheels locked  Call light in reach  Will continue to monitor

## 2017-04-06 NOTE — PLAN OF CARE
Problem: Patient Care Overview  Goal: Plan of Care Review  Pt on 3L NC with Q4 Duone[place] and  Q12 pulmicort

## 2017-04-06 NOTE — SUBJECTIVE & OBJECTIVE
Interval History:  Continue current treatment.     Review of Systems   Constitutional: Positive for activity change and fatigue. Negative for chills and fever.   HENT: Negative for ear pain and hearing loss.    Eyes: Negative for pain and redness.   Respiratory: Positive for cough and shortness of breath. Negative for apnea, choking, chest tightness and stridor.    Cardiovascular: Positive for leg swelling. Negative for chest pain and palpitations.   Gastrointestinal: Positive for abdominal pain. Negative for blood in stool, constipation, diarrhea, nausea and vomiting.   Endocrine: Negative for polydipsia and polyphagia.   Genitourinary: Negative for difficulty urinating, dysuria, flank pain and hematuria.   Musculoskeletal: Positive for arthralgias and gait problem. Negative for neck pain and neck stiffness.   Skin: Positive for color change and wound.        Abdominal wound to left abdomen with ulcerated areas with significant surrounding cellulitis   Allergic/Immunologic: Negative for food allergies.   Neurological: Positive for weakness. Negative for syncope and speech difficulty.   Hematological: Bruises/bleeds easily.   Psychiatric/Behavioral: Negative for confusion, decreased concentration and suicidal ideas.     Objective:     Vital Signs (Most Recent):  Temp: 98.3 °F (36.8 °C) (04/06/17 1700)  Pulse: 66 (04/06/17 1700)  Resp: 18 (04/06/17 1700)  BP: (!) 111/53 (04/06/17 1700)  SpO2: 100 % (04/06/17 1700) Vital Signs (24h Range):  Temp:  [97.9 °F (36.6 °C)-98.5 °F (36.9 °C)] 98.3 °F (36.8 °C)  Pulse:  [63-78] 66  Resp:  [14-18] 18  SpO2:  [92 %-100 %] 100 %  BP: ()/() 111/53     Weight: (!) 142 kg (313 lb)  Body mass index is 47.59 kg/(m^2).    Intake/Output Summary (Last 24 hours) at 04/06/17 1708  Last data filed at 04/06/17 0721   Gross per 24 hour   Intake              350 ml   Output              900 ml   Net             -550 ml      Physical Exam   Constitutional: She is oriented to person,  place, and time. She appears well-developed. No distress.   Morbidly obese   HENT:   Head: Normocephalic and atraumatic.   Eyes: Conjunctivae and EOM are normal. Pupils are equal, round, and reactive to light. Right eye exhibits no discharge. Left eye exhibits no discharge.   Neck: Normal range of motion. Neck supple.   Cardiovascular: Normal rate, regular rhythm and intact distal pulses.    Pulmonary/Chest: Effort normal. No stridor. No respiratory distress.   BBS diminished   Abdominal: Soft. Bowel sounds are normal. There is tenderness. There is no rebound and no guarding.   Tenderness surrounding abdominal wound   Genitourinary:   Genitourinary Comments: Not examined   Musculoskeletal: Normal range of motion. She exhibits no edema.   Neurological: She is alert and oriented to person, place, and time. No cranial nerve deficit.   Skin: Skin is warm and dry. She is not diaphoretic. There is erythema.   Abdominal wound to left abdomen with ulcerated areas with significant surrounding cellulitis   Psychiatric: She has a normal mood and affect. Her behavior is normal. Judgment and thought content normal.       Significant Labs: Reviewed

## 2017-04-06 NOTE — PT/OT/SLP EVAL
Physical Therapy  Evaluation    Nelly Tian   MRN: [id number]   Admitting Diagnosis: Sepsis    PT Received On: 17  PT Start Time: 955     PT Stop Time: 1006    PT Total Time (min): 11 min       Billable Minutes:  Evaluation 11    Diagnosis: Sepsis      Past Medical History:   Diagnosis Date    *Atrial flutter     Anxiety     Arthritis     Asthma     Atrial fibrillation     Back pain     Cataract     OD    CHF (congestive heart failure)     COPD (chronic obstructive pulmonary disease)     Depression     Diabetes mellitus     Emphysema of lung     Heart failure     Hernia     Hypercapnic respiratory failure, chronic 2016    Hyperlipidemia     Hypertension     Iron deficiency anemia 2/3/2016    Myocardial infarction     Obesity     Peripheral vascular disease     Pneumonia     Polyneuropathy     Retinal detachment     OS    Skin ulcer 3/18/2017    Tobacco dependence     Type II 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, not stated as uncontrolled       Past Surgical History:   Procedure Laterality Date    CATARACT EXTRACTION Left     OS      SECTION      x2    EYE SURGERY      HYSTERECTOMY      OOPHORECTOMY      one ovary conserved    RETINAL DETACHMENT SURGERY      buckle --OS    TONSILLECTOMY         Referring physician: Keke  Date referred to PT: 2017    General Precautions: Standard, fall  Orthopedic Precautions: N/A   Braces: N/A            Patient History:  Lives With: child(bonny), adult, grandchild(bonny)  Living Arrangements: mobile home  Home Accessibility:  (ramp access)  Transportation Available: family or friend will provide  Equipment Currently Used at Home: rollator, cane, straight, power chair  DME owned (not currently used):     Previous Level of Function:  Ambulation Skills: needs device  Transfer Skills: needs device  ADL Skills: needs device    Subjective:  Communicated with nurse Rivera prior to  session.  Pt found using BS- agreeable to PT  Pt c/o pain from chronic wound on abdomen  Pt stated she would wait for wound care service to address her dressing before ambulating to hallways    Chief Complaint: chronic wound pain  Patient goals:     Pain Ratin10   Location - Side: Left     Location: abdomen (open wound on abdomen)  Pain Addressed: Reposition, Pre-medicate for activity  Pain Rating Post-Intervention: 10    Objective:   Patient found with: peripheral IV, telemetry, oxygen     Cognitive Exam:  Oriented to: Person, Place, Time and Situation    Follows Commands/attention: Follows multistep  commands  Communication: clear/fluent  Safety awareness/insight to disability: intact    Physical Exam:  Postural examination/scapula alignment: Rounded shoulder and Head forward    Skin integrity: Wound L abdomen  Edema:     Sensation:   Intact    Upper Extremity Range of Motion:  Right Upper Extremity: WFL  Left Upper Extremity: WFL    Upper Extremity Strength:  Right Upper Extremity: WFL  Left Upper Extremity: WFL    Lower Extremity Range of Motion:  Right Lower Extremity: WFL  Left Lower Extremity: WFL    Lower Extremity Strength:  Right Lower Extremity: 3+/5  Left Lower Extremity: 3+/5     Fine motor coordination:      Gross motor coordination:     Functional Mobility:  Bed Mobility:  Rolling/Turning to Left: Supervision  Scooting/Bridging: Supervision  Sit to Supine: Supervision    Transfers:  Sit <> Stand Assistance: Supervision  Sit <> Stand Assistive Device:  (holding on to IV pole)  Toilet Transfer Assistance: Supervision  Toilet Transfer Assistive Device:  (IV pole)    Gait:   Gait Distance:  (10 ft from BSC to bed)  Assistance 1: Supervision  Gait Assistive Device:  (IV pole)  Gait Deviation(s): decreased madhav, decreased step length    Stairs:      Balance:   Static Sit: GOOD: Takes MODERATE challenges from all directions  Dynamic Sit: GOOD-: Maintains balance through MODERATE excursions of  active trunk movement,     Static Stand: FAIR+: Takes MINIMAL challenges from all directions  Dynamic stand: FAIR+: Needs CLOSE SUPERVISION during gait and is able to right self with minor LOB    Therapeutic Activities and Exercises:  Pt transferred from BSC to bed using IV pole for assist. Pt deferred gait training in hallways until wound dressing was addressed by Wound Care service. Pt performed bed mobility with SBA.       AM-PAC 6 CLICK MOBILITY  How much help from another person does this patient currently need?   1 = Unable, Total/Dependent Assistance  2 = A lot, Maximum/Moderate Assistance  3 = A little, Minimum/Contact Guard/Supervision  4 = None, Modified [place]/Independent          AM-PAC Raw Score CMS G-Code Modifier Level of Impairment Assistance   6 % Total / Unable   7 - 9 CM 80 - 100% Maximal Assist   10 - 14 CL 60 - 80% Moderate Assist   15 - 19 CK 40 - 60% Moderate Assist   20 - 22 CJ 20 - 40% Minimal Assist   23 CI 1-20% SBA / CGA   24 CH 0% Independent/ Mod I     Patient left HOB elevated with all lines intact and call button in reach.    Assessment:   Nelly Tian is a 66 y.o. female with a medical diagnosis of Sepsis and presents with weakness and impaired functional mobility. PTA pt reports using straight cane, rollator, or scooter/power chair for household and community mobility. Plan to assess gait more in depth tomorrow. Pt would benefit from continued therapy to address functional deficits.     Rehab identified problem list/impairments: Rehab identified problem list/impairments: pain, impaired skin, weakness, impaired endurance, impaired functional mobilty, gait instability    Rehab potential is fair.    Activity tolerance: Fair    Discharge recommendations: Discharge Facility/Level Of Care Needs: home with home health     Barriers to discharge:      Equipment recommendations:       GOALS:   Physical Therapy Goals        Problem: Physical Therapy Goal    Goal Priority  Disciplines Outcome Goal Variances Interventions   Physical Therapy Goal    High PT/OT, PT      Description:  Goals to be met by: 2017     Patient will increase functional independence with mobility by performin. Bed to chair transfer with Supervision using Rolling Walker  2. Gait  x 100 feet with Contact Guard Assistance using Rolling Walker.   3. Lower extremity exercise program x20 reps per handout, with assistance as needed                PLAN:    Patient to be seen 6 x/week to address the above listed problems via gait training, therapeutic activities, therapeutic exercises  Plan of Care expires: 17  Plan of Care reviewed with: patient      Bonnie Del Toro, SPT  2017      I certify that I was present in the room directing the student in service delivery and guiding them using my skilled judgment. As the co-signing therapist I have reviewed the students documentation and am responsible for the treatment, assessment, and plan.     Cyndy Banda, PT  2017

## 2017-04-06 NOTE — CONSULTS
Consult Note  Infectious Disease    Reason for Consult:  CELLULITIS OF ABDOMINAL WALL    HPI: Nelly Tian is a  66 y.o. female with a very large ventral hernia which hangs to her left and which she is not a candidate to repair. She reports developing some abdominal wall ulcerations from trauma associated with a potty chair that was too narrow. She was hospitalized here about 2-3 weeks ago for redness associated with this, improved with IV antibiotics and site care but yesterday she abruptly developed fever, chills and escalation of the redness on her abdominal wall. She was admitted and placed on vanc and zosyn.    Review of patient's allergies indicates:   Allergen Reactions    Dilaudid [hydromorphone] Anaphylaxis     Other reaction(s): Anaphylaxis  Other reaction(s): Unknown     Past Medical History:   Diagnosis Date    *Atrial flutter     Anxiety     Arthritis     Asthma     Atrial fibrillation     Back pain     Cataract     OD    CHF (congestive heart failure)     COPD (chronic obstructive pulmonary disease)     Depression     Diabetes mellitus     Emphysema of lung     Heart failure     Hernia     Hypercapnic respiratory failure, chronic 2016    Hyperlipidemia     Hypertension     Iron deficiency anemia 2/3/2016    Myocardial infarction     Obesity     Peripheral vascular disease     Pneumonia     Polyneuropathy     Retinal detachment     OS    Skin ulcer 3/18/2017    Tobacco dependence     Type II 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, not stated as uncontrolled      Past Surgical History:   Procedure Laterality Date    CATARACT EXTRACTION Left     OS      SECTION      x2    EYE SURGERY      HYSTERECTOMY      OOPHORECTOMY      one ovary conserved    RETINAL DETACHMENT SURGERY      buckle --OS    TONSILLECTOMY       Social History     Social History    Marital status:      Spouse name: N/A    Number of children: N/A     Years of education: N/A     Social History Main Topics    Smoking status: Former Smoker     Packs/day: 0.25     Years: 50.00     Types: Cigarettes     Start date: 1/19/1968     Quit date: 9/19/2015    Smokeless tobacco: Former User     Quit date: 2/12/2017      Comment: 1 ppd for 20 years    Alcohol use No    Drug use: No    Sexual activity: Not Currently     Other Topics Concern    None     Social History Narrative     Family History   Problem Relation Age of Onset    Arthritis Father     Heart disease Father     Early death Sister 30     heart disease    Heart disease Sister 32     MI    No Known Problems Brother     No Known Problems Daughter     Blindness Son      due to accident//    Arthritis Sister     Heart disease Sister     Crohn's disease Sister     Cancer Brother      asbestosis    Alcohol abuse Mother     Breast cancer Neg Hx     Glaucoma Neg Hx     Macular degeneration Neg Hx     Retinal detachment Neg Hx     Amblyopia Neg Hx     Diabetes Neg Hx     Cataracts Neg Hx     Hypertension Neg Hx     Strabismus Neg Hx     Stroke Neg Hx     Thyroid disease Neg Hx        Pertinent medications noted:     Review of Systems:   CHILLS AND FEVER. REDNESS DEVELOPED RAPIDLY.   TRIES TO PROP UP HER LATERAL ABDOMEN  HAS LOST 30#  SEES DR. LIM [facility]    EXAM & DIAGNOSTICS REVIEWED:   Vitals:     Temp:  [97.9 °F (36.6 °C)-102.3 °F (39.1 °C)]   Temp: 98.5 °F (36.9 °C) (04/06/17 1242)  Pulse: 67 (04/06/17 1242)  Resp: 16 (04/06/17 1242)  BP: (!) 98/50 (04/06/17 1242)  SpO2: (!) 93 % (04/06/17 1242)    Intake/Output Summary (Last 24 hours) at 04/06/17 1654  Last data filed at 04/06/17 0721   Gross per 24 hour   Intake              350 ml   Output              900 ml   Net             -550 ml       General:  In NAD. Looks non toxic. Alert and attentive, cooperative    Musc:  Joints without effusion, swelling,  erythema, synovitis,   Skin:  SEVERE VENOUS CONGESTION OF  DEPENDENT LEFT ABDOMEN. ELEVATING THE SOFT TISSUE RESULTS IN REDUCTION IN REDNESS BY ABOU T75%.   SEVERE CANDIDA INTERTRIGO, WITH FISSURES  Wound: LEFT SIDE OF ABDOMEN WITH 3-4 CIRCULAR ULCERATIONS, ONE OF WHICH HAS SOME NECROTIC SLOUGH. THE CELLULITIS COULD HAVE EMANATED FROM EITHER SITE  Neuro: AAOx3, speech clear, moves all extrems equally  Extrem: No edema, erythema, phlebitis, cellulitis,   VAD:    Lines/Tubes/Drains:    General Labs reviewed:    Recent Labs  Lab 04/06/17 0457   WBC 11.30   RBC 3.89*   HGB 9.7*   HCT 31.5*      MCV 81*   MCH 25.0*   MCHC 30.9*       Recent Labs  Lab 04/06/17 0457   CALCIUM 8.5*      K 3.4*   CO2 31*   CL 99   BUN 11   CREATININE 0.6           Micro:  Microbiology Results (last 7 days)     Procedure Component Value Units Date/Time    Urine culture [[id number]] Collected:  04/06/17 0100    Order Status:  Sent Specimen:  Urine from Urine, Clean Catch Updated:  04/06/17 1001    Blood culture [[id number]] Collected:  04/05/17 1517    Order Status:  Completed Specimen:  Blood Updated:  04/06/17 0515     Blood Culture, Routine No Growth to date    Narrative:       Aerobic and anaerobic    Blood culture [[id number]] Collected:  04/05/17 1515    Order Status:  Completed Specimen:  Blood Updated:  04/06/17 0515     Blood Culture, Routine No Growth to date    Narrative:       Aerobic and anaerobic    Urine culture [[id number]]     Order Status:  Canceled Specimen:  Urine         Imaging Reviewed:   CXR    IMPRESSION & PLAN   1. Cellulitis of the abdominal wall. About 25% bacterial and 75% venous stasis. Could be Staph or strep  2. Ulcerations left side of abd wall with limited necrosis. Nothing to suggest deep infection  3. Infra-abdominal/skin folds severe candida intertrigo  4. Super morbid obesity, large left sided abdominal hernia     Recommendation:   Continue vanc and de-escalate zosyn to ancef as gram negative organisms are unlikely. (nothing really to culture)  Diflucan  to accelerate clearance of candidiasis  Elevate left side of abdomen  Santyl to necrosis  Clean, dry, nystatin powder, separate skin folds    Consider SNF to help her get ahead of these wounds

## 2017-04-06 NOTE — CONSULTS
Educated patient on Vitamin K/coumadin interaction. Reviewed sources of vitamin K and importance of keeping intake consistent.   See education tab. Provided nutrition packet with email should questions arise.

## 2017-04-07 LAB
ANION GAP SERPL CALC-SCNC: 12 MMOL/L
BACTERIA UR CULT: NO GROWTH
BASOPHILS # BLD AUTO: 0.3 K/UL
BASOPHILS NFR BLD: 2.6 %
BUN SERPL-MCNC: 8 MG/DL
CALCIUM SERPL-MCNC: 8.9 MG/DL
CHLORIDE SERPL-SCNC: 99 MMOL/L
CO2 SERPL-SCNC: 30 MMOL/L
CREAT SERPL-MCNC: 0.7 MG/DL
DIFFERENTIAL METHOD: ABNORMAL
EOSINOPHIL # BLD AUTO: 0.2 K/UL
EOSINOPHIL NFR BLD: 1.7 %
ERYTHROCYTE [DISTWIDTH] IN BLOOD BY AUTOMATED COUNT: 17.3 %
EST. GFR  (AFRICAN AMERICAN): >60 ML/MIN/1.73 M^2
EST. GFR  (NON AFRICAN AMERICAN): >60 ML/MIN/1.73 M^2
GLUCOSE SERPL-MCNC: 111 MG/DL
HCT VFR BLD AUTO: 32.8 %
HGB BLD-MCNC: 10.1 G/DL
INR PPP: 1.4
LYMPHOCYTES # BLD AUTO: 3 K/UL
LYMPHOCYTES NFR BLD: 28.3 %
MCH RBC QN AUTO: 24.7 PG
MCHC RBC AUTO-ENTMCNC: 30.8 %
MCV RBC AUTO: 80 FL
MONOCYTES # BLD AUTO: 1.5 K/UL
MONOCYTES NFR BLD: 13.8 %
NEUTROPHILS # BLD AUTO: 5.7 K/UL
NEUTROPHILS NFR BLD: 53.6 %
PLATELET # BLD AUTO: 282 K/UL
PMV BLD AUTO: 8.2 FL
POCT GLUCOSE: 103 MG/DL (ref 70–110)
POCT GLUCOSE: 129 MG/DL (ref 70–110)
POCT GLUCOSE: 143 MG/DL (ref 70–110)
POCT GLUCOSE: 151 MG/DL (ref 70–110)
POTASSIUM SERPL-SCNC: 3.7 MMOL/L
PROTHROMBIN TIME: 14.9 SEC
RBC # BLD AUTO: 4.09 M/UL
SODIUM SERPL-SCNC: 141 MMOL/L
VANCOMYCIN TROUGH SERPL-MCNC: 25.8 UG/ML
WBC # BLD AUTO: 10.7 K/UL

## 2017-04-07 PROCEDURE — 25000003 PHARM REV CODE 250: Performed by: HOSPITALIST

## 2017-04-07 PROCEDURE — 36415 COLL VENOUS BLD VENIPUNCTURE: CPT

## 2017-04-07 PROCEDURE — 85025 COMPLETE CBC W/AUTO DIFF WBC: CPT

## 2017-04-07 PROCEDURE — 11000001 HC ACUTE MED/SURG PRIVATE ROOM

## 2017-04-07 PROCEDURE — 87075 CULTR BACTERIA EXCEPT BLOOD: CPT

## 2017-04-07 PROCEDURE — 94640 AIRWAY INHALATION TREATMENT: CPT

## 2017-04-07 PROCEDURE — 25000242 PHARM REV CODE 250 ALT 637 W/ HCPCS: Performed by: NURSE PRACTITIONER

## 2017-04-07 PROCEDURE — 87077 CULTURE AEROBIC IDENTIFY: CPT

## 2017-04-07 PROCEDURE — 97597 DBRDMT OPN WND 1ST 20 CM/<: CPT | Mod: ,,, | Performed by: SURGERY

## 2017-04-07 PROCEDURE — 0HB7XZZ EXCISION OF ABDOMEN SKIN, EXTERNAL APPROACH: ICD-10-PCS | Performed by: SURGERY

## 2017-04-07 PROCEDURE — 97116 GAIT TRAINING THERAPY: CPT

## 2017-04-07 PROCEDURE — 80202 ASSAY OF VANCOMYCIN: CPT

## 2017-04-07 PROCEDURE — 63600175 PHARM REV CODE 636 W HCPCS: Performed by: INTERNAL MEDICINE

## 2017-04-07 PROCEDURE — 27000221 HC OXYGEN, UP TO 24 HOURS

## 2017-04-07 PROCEDURE — 87070 CULTURE OTHR SPECIMN AEROBIC: CPT

## 2017-04-07 PROCEDURE — 25000003 PHARM REV CODE 250: Performed by: NURSE PRACTITIONER

## 2017-04-07 PROCEDURE — 87186 SC STD MICRODIL/AGAR DIL: CPT

## 2017-04-07 PROCEDURE — 80048 BASIC METABOLIC PNL TOTAL CA: CPT

## 2017-04-07 PROCEDURE — 25000003 PHARM REV CODE 250: Performed by: EMERGENCY MEDICINE

## 2017-04-07 PROCEDURE — 94761 N-INVAS EAR/PLS OXIMETRY MLT: CPT

## 2017-04-07 PROCEDURE — 63600175 PHARM REV CODE 636 W HCPCS: Performed by: HOSPITALIST

## 2017-04-07 PROCEDURE — 85610 PROTHROMBIN TIME: CPT

## 2017-04-07 PROCEDURE — 25000003 PHARM REV CODE 250: Performed by: INTERNAL MEDICINE

## 2017-04-07 RX ORDER — ENOXAPARIN SODIUM 150 MG/ML
140 INJECTION SUBCUTANEOUS
Status: DISCONTINUED | OUTPATIENT
Start: 2017-04-07 | End: 2017-04-11 | Stop reason: HOSPADM

## 2017-04-07 RX ORDER — LIDOCAINE HYDROCHLORIDE 10 MG/ML
1 INJECTION, SOLUTION EPIDURAL; INFILTRATION; INTRACAUDAL; PERINEURAL ONCE
Status: DISCONTINUED | OUTPATIENT
Start: 2017-04-07 | End: 2017-04-11 | Stop reason: HOSPADM

## 2017-04-07 RX ADMIN — HYDROCODONE BITARTRATE AND ACETAMINOPHEN 1 TABLET: 10; 325 TABLET ORAL at 09:04

## 2017-04-07 RX ADMIN — THERA TABS 1 TABLET: TAB at 09:04

## 2017-04-07 RX ADMIN — ENOXAPARIN SODIUM 140 MG: 150 INJECTION SUBCUTANEOUS at 06:04

## 2017-04-07 RX ADMIN — POTASSIUM CHLORIDE 20 MEQ: 20 TABLET, EXTENDED RELEASE ORAL at 09:04

## 2017-04-07 RX ADMIN — IPRATROPIUM BROMIDE AND ALBUTEROL SULFATE 3 ML: .5; 3 SOLUTION RESPIRATORY (INHALATION) at 07:04

## 2017-04-07 RX ADMIN — ATORVASTATIN CALCIUM 20 MG: 20 TABLET, FILM COATED ORAL at 09:04

## 2017-04-07 RX ADMIN — Medication 220 MG: at 09:04

## 2017-04-07 RX ADMIN — LIDOCAINE HYDROCHLORIDE: 20 SOLUTION ORAL; TOPICAL at 05:04

## 2017-04-07 RX ADMIN — BUDESONIDE 0.5 MG: 0.5 INHALANT RESPIRATORY (INHALATION) at 07:04

## 2017-04-07 RX ADMIN — CLONAZEPAM 1 MG: 1 TABLET ORAL at 10:04

## 2017-04-07 RX ADMIN — CEFAZOLIN SODIUM 2 G: 2 SOLUTION INTRAVENOUS at 05:04

## 2017-04-07 RX ADMIN — PANTOPRAZOLE SODIUM 40 MG: 40 TABLET, DELAYED RELEASE ORAL at 09:04

## 2017-04-07 RX ADMIN — Medication 400 MG: at 09:04

## 2017-04-07 RX ADMIN — VANCOMYCIN HYDROCHLORIDE 1500 MG: 1 INJECTION, POWDER, LYOPHILIZED, FOR SOLUTION INTRAVENOUS at 01:04

## 2017-04-07 RX ADMIN — HYDROCODONE BITARTRATE AND ACETAMINOPHEN 1 TABLET: 10; 325 TABLET ORAL at 10:04

## 2017-04-07 RX ADMIN — IPRATROPIUM BROMIDE AND ALBUTEROL SULFATE 3 ML: .5; 3 SOLUTION RESPIRATORY (INHALATION) at 04:04

## 2017-04-07 RX ADMIN — POTASSIUM & SODIUM PHOSPHATES POWDER PACK 280-160-250 MG 1 PACKET: 280-160-250 PACK at 09:04

## 2017-04-07 RX ADMIN — FUROSEMIDE 40 MG: 40 TABLET ORAL at 05:04

## 2017-04-07 RX ADMIN — STANDARDIZED SENNA CONCENTRATE AND DOCUSATE SODIUM 1 TABLET: 8.6; 5 TABLET, FILM COATED ORAL at 12:04

## 2017-04-07 RX ADMIN — VANCOMYCIN HYDROCHLORIDE 1500 MG: 1 INJECTION, POWDER, LYOPHILIZED, FOR SOLUTION INTRAVENOUS at 10:04

## 2017-04-07 RX ADMIN — GABAPENTIN 600 MG: 300 CAPSULE ORAL at 05:04

## 2017-04-07 RX ADMIN — GABAPENTIN 600 MG: 300 CAPSULE ORAL at 09:04

## 2017-04-07 RX ADMIN — RAMELTEON 8 MG: 8 TABLET, FILM COATED ORAL at 10:04

## 2017-04-07 RX ADMIN — HYDROCODONE BITARTRATE AND ACETAMINOPHEN 1 TABLET: 10; 325 TABLET ORAL at 04:04

## 2017-04-07 RX ADMIN — IPRATROPIUM BROMIDE AND ALBUTEROL SULFATE 3 ML: .5; 3 SOLUTION RESPIRATORY (INHALATION) at 11:04

## 2017-04-07 RX ADMIN — IPRATROPIUM BROMIDE AND ALBUTEROL SULFATE 3 ML: .5; 3 SOLUTION RESPIRATORY (INHALATION) at 12:04

## 2017-04-07 RX ADMIN — POTASSIUM & SODIUM PHOSPHATES POWDER PACK 280-160-250 MG 1 PACKET: 280-160-250 PACK at 05:04

## 2017-04-07 RX ADMIN — GABAPENTIN 600 MG: 300 CAPSULE ORAL at 01:04

## 2017-04-07 RX ADMIN — METFORMIN HYDROCHLORIDE 500 MG: 500 TABLET, FILM COATED ORAL at 09:04

## 2017-04-07 RX ADMIN — FUROSEMIDE 40 MG: 40 TABLET ORAL at 09:04

## 2017-04-07 RX ADMIN — METOPROLOL SUCCINATE 12.5 MG: 25 TABLET, EXTENDED RELEASE ORAL at 10:04

## 2017-04-07 RX ADMIN — CEFAZOLIN SODIUM 2 G: 2 SOLUTION INTRAVENOUS at 01:04

## 2017-04-07 RX ADMIN — Medication: at 09:04

## 2017-04-07 RX ADMIN — OXYCODONE HYDROCHLORIDE AND ACETAMINOPHEN 500 MG: 500 TABLET ORAL at 09:04

## 2017-04-07 RX ADMIN — Medication: at 10:04

## 2017-04-07 RX ADMIN — CEFAZOLIN SODIUM 2 G: 2 SOLUTION INTRAVENOUS at 10:04

## 2017-04-07 RX ADMIN — CLONAZEPAM 1 MG: 1 TABLET ORAL at 09:04

## 2017-04-07 RX ADMIN — POLYETHYLENE GLYCOL (3350) 17 G: 17 POWDER, FOR SOLUTION ORAL at 09:04

## 2017-04-07 RX ADMIN — HYDROCODONE BITARTRATE AND ACETAMINOPHEN 1 TABLET: 10; 325 TABLET ORAL at 12:04

## 2017-04-07 RX ADMIN — METFORMIN HYDROCHLORIDE 500 MG: 500 TABLET, FILM COATED ORAL at 05:04

## 2017-04-07 RX ADMIN — IPRATROPIUM BROMIDE AND ALBUTEROL SULFATE 3 ML: .5; 3 SOLUTION RESPIRATORY (INHALATION) at 03:04

## 2017-04-07 RX ADMIN — FLUCONAZOLE 200 MG: 100 TABLET ORAL at 05:04

## 2017-04-07 NOTE — NURSING
"Pt refuse to have dressing change on the left side of abdomen. Asked twice and she refuse. Pt stated" I will wait until the AM when my daughter is here to help me take a shower and she will help me change the dressing". Educated pt on the importance of having dressing change to prevent infection. Will continue to monitor.  "

## 2017-04-07 NOTE — CONSULTS
[facility] Surgery  Consult Note    Patient Name: Nelly Tian  MRN: [id number]  Code Status: Full Code  Admission Date: 4/5/2017  Hospital Length of Stay: 1 days  Attending Physician: Micki Davies MD  Primary Care Provider: Constantine Bird MD    Patient information was obtained from patient and ER records.     Consults  Subjective:     Principal Problem: Sepsis    History of Present Illness: 67 y/o female who was sent here for fever and redness on her abdomen by her home health nurse.  She has been treated for cellulitis of her abdominal wall in the past.  She has develop ulceration of the abdomen now with redness warmth and fever.  She also notes that she has been around her daughter who may have had a cough.  She was admitted with SOB but does not complain of this to me.    No current facility-administered medications on file prior to encounter.      Current Outpatient Prescriptions on File Prior to Encounter   Medication Sig    acetaminophen (TYLENOL) 325 MG tablet Take 2 tablets (650 mg total) by mouth every 6 (six) hours as needed.    albuterol (ACCUNEB) 0.63 mg/3 mL Nebu INHALE THE CONTENTS OF 1 VIAL  BY  NEBULIZER EVERY 6 HOURS AS NEEDED    albuterol (VENTOLIN HFA) 90 mcg/actuation inhaler INHALE TWO PUFFS BY MOUTH EVERY 6 HOURS AS NEEDED FOR WHEEZING    albuterol-ipratropium 2.5mg-0.5mg/3mL (DUO-NEB) 0.5 mg-3 mg(2.5 mg base)/3 mL nebulizer solution INHALE THE CONTENTS OF 1 VIAL VIA NEBULIZER EVERY 6 HOURS AS NEEDED FOR  WHEEZING (DO NOT USE WITH ALBUTEROL INHALER)    ascorbic acid, vitamin C, (VITAMIN C) 500 MG tablet Take 1 tablet (500 mg total) by mouth every evening. (Patient taking differently: Take 500 mg by mouth 2 (two) times daily. )    atorvastatin (LIPITOR) 20 MG tablet Take 1 tablet (20 mg total) by mouth once daily.    budesonide (PULMICORT) 0.5 mg/2 mL nebulizer solution INHALE THE CONTENTS OF 1 VIAL VIA NEBULIZER EVERY DAY (Patient taking differently:  INHALE THE CONTENTS OF 1 VIAL VIA NEBULIZER Three times EVERY DAY as needed)    clonazePAM (KLONOPIN) 1 MG tablet Take 1 tablet (1 mg total) by mouth 2 (two) times daily as needed for Anxiety.    escitalopram oxalate (LEXAPRO) 10 MG tablet TAKE 1 TABLET ONE TIME DAILY    furosemide (LASIX) 40 MG tablet Take 1 tablet (40 mg total) by mouth 2 (two) times daily.    gabapentin (NEURONTIN) 600 MG tablet Take 1 tablet (600 mg total) by mouth 3 (three) times daily.    hydrocodone-acetaminophen 10-325mg (NORCO)  mg Tab Take 1 tablet by mouth every 6 (six) hours as needed. (Patient taking differently: Take 1 tablet by mouth every 6 (six) hours as needed for Pain. )    levalbuterol (XOPENEX) 0.63 mg/3 mL nebulizer solution INHALE THE CONTENTS OF 1 VIAL BY NEBULIZATION 3 (THREE) TIMES DAILY.    lisinopril (PRINIVIL,ZESTRIL) 20 MG tablet TAKE 1 TABLET ONE TIME DAILY    magnesium hydroxide 400 mg/5 ml (MILK OF MAGNESIA) 400 mg/5 mL Susp Take 30 mLs by mouth daily as needed.    metformin (GLUCOPHAGE) 500 MG tablet TAKE 1 TABLET TWICE DAILY WITH MEALS    methocarbamol (ROBAXIN) 750 MG Tab TAKE 1 TABLET FOUR TIMES DAILY    metoprolol succinate (TOPROL-XL) 25 MG 24 hr tablet Take 0.5 tablets (12.5 mg total) by mouth once daily. (Patient taking differently: Take 12.5 mg by mouth once daily. On hold waiting for dr visit)    miconazole NITRATE 2 % (MICOTIN) 2 % top powder Apply topically 2 (two) times daily. Skin folds    multivitamin (THERAGRAN) tablet Take 1 tablet by mouth once daily.    oxycodone (ROXICODONE) 10 mg Tab immediate release tablet Take 1 tablet (10 mg total) by mouth every 12 (twelve) hours as needed for Pain.    polyethylene glycol (GLYCOLAX) 17 gram/dose powder MIX 17 GRAMS IN LIQUID AND DRINK EVERY DAY AS NEEDED    potassium chloride SA (K-DUR,KLOR-CON) 20 MEQ tablet Take 1 tablet (20 mEq total) by mouth once daily.    SANTYL ointment APPLY  OINTMENT TOPICALLY TO AFFECTED AREA ONCE DAILY  (Patient taking differently: APPLY  OINTMENT TOPICALLY TO left thigh AFFECTED AREA ONCE DAILY)    SPIRIVA WITH HANDIHALER 18 mcg inhalation capsule INHALE THE CONTENTS OF 1 CAPSULE EVERY DAY    temazepam (RESTORIL) 15 mg Cap TAKE 1 CAPSULE ONE TIME DAILY (Patient taking differently: TAKE 1 CAPSULE HS PRN insomnia)    warfarin (COUMADIN) 5 MG tablet TAKE ONE TABLET EVERY DAY OR AS DIRECTED BY COUMADIN CLINIC (Patient taking differently: 5 mg night DAILY)       Review of patient's allergies indicates:   Allergen Reactions    Dilaudid [hydromorphone] Anaphylaxis     Other reaction(s): Anaphylaxis  Other reaction(s): Unknown       Past Medical History:   Diagnosis Date    *Atrial flutter     Anxiety     Arthritis     Asthma     Atrial fibrillation     Back pain     Cataract     OD    CHF (congestive heart failure)     COPD (chronic obstructive pulmonary disease)     Depression     Diabetes mellitus     Emphysema of lung     Heart failure     Hernia     Hypercapnic respiratory failure, chronic 2016    Hyperlipidemia     Hypertension     Iron deficiency anemia 2/3/2016    Myocardial infarction     Obesity     Peripheral vascular disease     Pneumonia     Polyneuropathy     Retinal detachment     OS    Skin ulcer 3/18/2017    Tobacco dependence     Type II 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, not stated as uncontrolled      Past Surgical History:   Procedure Laterality Date    CATARACT EXTRACTION Left     OS      SECTION      x2    EYE SURGERY      HYSTERECTOMY      OOPHORECTOMY      one ovary conserved    RETINAL DETACHMENT SURGERY      buckle --OS    TONSILLECTOMY       Family History     Problem Relation (Age of Onset)    Alcohol abuse Mother    Arthritis Father, Sister    Blindness Son    Cancer Brother    Crohn's disease Sister    Early death Sister (30)    Heart disease Father, Sister (32), Sister    No Known Problems Brother,  Daughter        Social History Main Topics    Smoking status: Former Smoker     Packs/day: 0.25     Years: 50.00     Types: Cigarettes     Start date: 1/19/1968     Quit date: 9/19/2015    Smokeless tobacco: Former User     Quit date: 2/12/2017      Comment: 1 ppd for 20 years    Alcohol use No    Drug use: No    Sexual activity: Not Currently     Review of Systems   Constitutional: Positive for activity change and fatigue. Negative for chills and fever.   HENT: Negative for ear pain and hearing loss.    Eyes: Negative for pain and redness.   Respiratory: Positive for cough. Negative for apnea, choking, chest tightness, shortness of breath and stridor.    Cardiovascular: Positive for leg swelling. Negative for chest pain and palpitations.   Gastrointestinal: Positive for abdominal pain. Negative for blood in stool, constipation, diarrhea, nausea and vomiting.   Endocrine: Negative for polydipsia and polyphagia.   Genitourinary: Negative for difficulty urinating, dysuria, flank pain and hematuria.   Musculoskeletal: Positive for arthralgias and gait problem. Negative for neck pain and neck stiffness.   Skin: Positive for color change and wound.        Abdominal wound to left abdomen with ulcerated areas with significant surrounding cellulitis   Allergic/Immunologic: Negative for food allergies.   Neurological: Positive for weakness. Negative for syncope and speech difficulty.   Hematological: Bruises/bleeds easily.   Psychiatric/Behavioral: Negative for confusion, decreased concentration and suicidal ideas.     Objective:     Vital Signs (Most Recent):  Temp: 98.3 °F (36.8 °C) (04/06/17 1700)  Pulse: 60 (04/06/17 2031)  Resp: 18 (04/06/17 2031)  BP: (!) 111/53 (04/06/17 1700)  SpO2: 96 % (04/06/17 2031) Vital Signs (24h Range):  Temp:  [97.9 °F (36.6 °C)-98.5 °F (36.9 °C)] 98.3 °F (36.8 °C)  Pulse:  [60-78] 60  Resp:  [14-18] 18  SpO2:  [92 %-100 %] 96 %  BP: ()/() 111/53     Weight: (!) 142 kg (313  lb)  Body mass index is 47.59 kg/(m^2).    Physical Exam   Constitutional: She is oriented to person, place, and time. She appears well-developed. No distress.   Morbidly obese   HENT:   Head: Normocephalic and atraumatic.   Eyes: Conjunctivae and EOM are normal. Pupils are equal, round, and reactive to light. Right eye exhibits no discharge. Left eye exhibits no discharge.   Neck: Normal range of motion. Neck supple.   Cardiovascular: Normal rate, regular rhythm and intact distal pulses.    Pulmonary/Chest: Effort normal. No stridor. No respiratory distress.   BBS diminished   Abdominal: Soft. Bowel sounds are normal. There is no tenderness. There is no rebound and no guarding.   Large abdominal wall hernia to the left of abdomen   Genitourinary:   Genitourinary Comments: Not examined   Musculoskeletal: Normal range of motion. She exhibits no edema.   Neurological: She is alert and oriented to person, place, and time. No cranial nerve deficit.   Skin: Skin is warm and dry. She is not diaphoretic. There is erythema.   Abdominal wound to left abdomen with ulcerated areas with significant surrounding erythema.  Appears to be improving from demarcated border, and emanating from one of the ulcerated areas on her panus.  Ulcerated areas with some necrotic debris- see picture from ED   Psychiatric: She has a normal mood and affect. Her behavior is normal. Judgment and thought content normal.       Significant Labs:  CBC:   Recent Labs  Lab 04/06/17 0457   WBC 11.30   RBC 3.89*   HGB 9.7*   HCT 31.5*      MCV 81*   MCH 25.0*   MCHC 30.9*     BMP:   Recent Labs  Lab 04/06/17 0457   *      K 3.4*   CL 99   CO2 31*   BUN 11   CREATININE 0.6   CALCIUM 8.5*   MG 2.1     CMP:   Recent Labs  Lab 04/05/17  1500 04/06/17 0457   * 129*   CALCIUM 9.2 8.5*   ALBUMIN 3.0*  --    PROT 7.6  --     139   K 4.3 3.4*   CO2 30* 31*   CL 97 99   BUN 10 11   CREATININE 0.7 0.6   ALKPHOS 88  --    ALT 11  --     AST 19  --    BILITOT 0.6  --        Significant Diagnostics:  none    Assessment/Plan:     Diabetes mellitus with neuropathy  Tight control of blood sugars  Low carb diet      Infected wound  Suspect this is a cellulitis from the skin breakdown on her pannus.  Unlikely panniculitis.  Would continue IV antibiotics and monitor for improvement.  I do not suspect and abscess in this area as there are no clinical signs of this.  Continue local wound care.  Will monitor and consider debridement of locally necrotic tissue. When INR less than 1.5.        VTE Risk Mitigation     None          Thank you for your consult. I will follow-up with patient. Please contact us if you have any additional questions.    Esther Lucero MD  General Surgery  [facility]

## 2017-04-07 NOTE — PROGRESS NOTES
[facility] Surgery  Progress Note    Subjective:     History of Present Illness:  67 y/o female who was sent here for fever and redness on her abdomen by her home health nurse.  She has been treated for cellulitis of her abdominal wall in the past.  She has develop ulceration of the abdomen now with redness warmth and fever.  She also notes that she has been around her daughter who may have had a cough.  She was admitted with SOB but does not complain of this to me.    Post-Op Info:  * No surgery found *         Interval History: no acute events    Medications:  Continuous Infusions:   Scheduled Meds:   albuterol-ipratropium 2.5mg-0.5mg/3mL  3 mL Nebulization Q4H    ascorbic acid (vitamin C)  500 mg Oral QHS    atorvastatin  20 mg Oral Daily    budesonide  0.5 mg Nebulization Q12H    ceFAZolin (ANCEF) IVPB  2 g Intravenous Q8H    enoxaparin  140 mg Subcutaneous Q12H    escitalopram oxalate  10 mg Oral Daily    fluconazole  200 mg Oral Q24H    furosemide  20 mg Intravenous Once    furosemide  40 mg Oral BID    gabapentin  600 mg Oral TID    magnesium oxide  400 mg Oral BID    metformin  500 mg Oral BID WM    metoprolol succinate  12.5 mg Oral Daily    miconazole NITRATE 2 %   Topical BID    multivitamin  1 tablet Oral Daily    pantoprazole  40 mg Oral Daily    polyethylene glycol  17 g Oral Daily    potassium chloride SA  20 mEq Oral Daily    potassium, sodium phosphates  1 packet Oral QID (WM & HS)    vancomycin (VANCOCIN) IVPB  1,500 mg Intravenous Q8H    zinc sulfate  220 mg Oral Daily     PRN Meds:acetaminophen, clonazePAM, dextrose 50%, dextrose 50%, glucagon (human recombinant), glucose, glucose, hydrocodone-acetaminophen 10-325mg, insulin aspart, magnesium hydroxide 400 mg/5 ml, methocarbamol, ondansetron, oxycodone, ramelteon, senna-docusate 8.6-50 mg     Review of patient's allergies indicates:   Allergen Reactions    Dilaudid [hydromorphone] Anaphylaxis      Other reaction(s): Anaphylaxis  Other reaction(s): Unknown     Objective:     Vital Signs (Most Recent):  Temp: 98.3 °F (36.8 °C) (04/07/17 1233)  Pulse: 65 (04/07/17 1233)  Resp: 18 (04/07/17 1233)  BP: (!) 127/51 (04/07/17 1233)  SpO2: 99 % (04/07/17 1233) Vital Signs (24h Range):  Temp:  [97.3 °F (36.3 °C)-98.5 °F (36.9 °C)] 98.3 °F (36.8 °C)  Pulse:  [60-85] 65  Resp:  [16-18] 18  SpO2:  [84 %-100 %] 99 %  BP: (105-127)/(47-56) 127/51     Weight: (!) 142 kg (313 lb)  Body mass index is 47.59 kg/(m^2).    Intake/Output - Last 3 Shifts       04/05 0700 - 04/06 0659 04/06 0700 - 04/07 0659 04/07 0700 - 04/08 0659    P.O.  450     IV Piggyback  900     Total Intake(mL/kg)  1350 (9.5)     Urine (mL/kg/hr) 450 2250 (0.7)     Total Output 450 2250      Net -450 -900             Urine Occurrence 2 x 2 x     Stool Occurrence   1 x          Physical Exam   Constitutional: She is oriented to person, place, and time. She appears well-developed and well-nourished. No distress.   HENT:   Head: Normocephalic and atraumatic.   Mouth/Throat: No oropharyngeal exudate.   Eyes: Conjunctivae and EOM are normal. Pupils are equal, round, and reactive to light. No scleral icterus.   Neck: Normal range of motion. Neck supple. No JVD present. No tracheal deviation present. No thyromegaly present.   Cardiovascular: Normal rate, regular rhythm and normal heart sounds.  Exam reveals no gallop and no friction rub.    No murmur heard.  Pulmonary/Chest: Effort normal and breath sounds normal. No stridor. No respiratory distress. She has no wheezes. She has no rales. She exhibits no tenderness.   Abdominal: Soft. Bowel sounds are normal. She exhibits no distension and no mass. There is no tenderness. There is no rebound and no guarding.   Erythema much improved around ulceration, nearly resolved but some remains.  Necrotic area with ulcerated region remain but very small   Musculoskeletal: Normal range of motion. She exhibits edema. She  exhibits no tenderness.   Lymphadenopathy:     She has no cervical adenopathy.   Neurological: She is alert and oriented to person, place, and time. No cranial nerve deficit.   Skin: Skin is warm and dry. No rash noted. She is not diaphoretic. No erythema.   Psychiatric: She has a normal mood and affect. Her behavior is normal.   Nursing note and vitals reviewed.      Significant Labs:  CBC:   Recent Labs  Lab 04/07/17  0813   WBC 10.70   RBC 4.09   HGB 10.1*   HCT 32.8*      MCV 80*   MCH 24.7*   MCHC 30.8*     BMP:   Recent Labs  Lab 04/06/17  0457 04/07/17  0813   * 111*    141   K 3.4* 3.7   CL 99 99   CO2 31* 30*   BUN 11 8   CREATININE 0.6 0.7   CALCIUM 8.5* 8.9   MG 2.1  --        Significant Diagnostics:  none    Assessment/Plan:     Cellulitis, abdominal wall  Got lovenox this morning  Plan to debride the small area of necrosis in ulcer this afternoon  Care as per medicine      Esther Lucero MD  General Surgery  [facility]

## 2017-04-07 NOTE — ASSESSMENT & PLAN NOTE
2000 ADA diet  Accuchecks with correctional SSI  Continue home metformin and gabapentin  Blood sugars running 103-134

## 2017-04-07 NOTE — PLAN OF CARE
Problem: Patient Care Overview  Goal: Plan of Care Review  Outcome: Ongoing (interventions implemented as appropriate)  POC reviewed with pt, verbalized understanding  NAD noted  Vs obtained  PRN meds given for pain  pt ambulates to BSC   Abx infusing as ordered  Free of fall or injury  BG monitored  Pt refuse miconazole stated that she will wait until AM when her daughter can come and help her shower.  Bed in lowest position, wheels locked  Call light in reach  Will continue to monitor

## 2017-04-07 NOTE — PLAN OF CARE
Problem: Physical Therapy Goal  Goal: Physical Therapy Goal  Goals to be met by: 2017     Patient will increase functional independence with mobility by performin. Bed to chair transfer with Supervision using Rolling Walker  2. Gait x 100 feet with Contact Guard Assistance using Rolling Walker.   3. Lower extremity exercise program x20 reps per handout, with assistance as needed   Outcome: Ongoing (interventions implemented as appropriate)  Ambulated 80' with S , pushing IV pole with O2 attached.

## 2017-04-07 NOTE — PROCEDURES
"Nelly Tian is a 66 y.o. female patient.    Temp: 97.6 °F (36.4 °C) (04/07/17 1620)  Pulse: 74 (04/07/17 1620)  Resp: 20 (04/07/17 1620)  BP: (!) 108/48 (04/07/17 1620)  SpO2: 99 % (04/07/17 1620)  Weight: (!) 142 kg (313 lb) (04/05/17 2159)  Height: 5' 8" (172.7 cm) (04/05/17 2159)       Sharp debridment  Date/Time: 4/7/2017 5:16 PM  Location procedure was performed: [facility]/PROGRESSIVE CARE UNIT  Performed by: RAISA PHAN  Authorized by: RAISA PHAN   Pre-operative diagnosis: infected wound  Post-operative diagnosis: infected wound  Consent Done: Yes  Consent: Written consent obtained.  Risks and benefits: risks, benefits and alternatives were discussed  Consent given by: patient  Patient understanding: patient states understanding of the procedure being performed  Patient consent: the patient's understanding of the procedure matches consent given  Procedure consent: procedure consent matches procedure scheduled  Relevant documents: relevant documents present and verified  Test results: test results available and properly labeled  Site marked: the operative site was marked  Imaging studies: imaging studies available  Required items: required blood products, implants, devices, and special equipment available  Patient identity confirmed: name and verbally with patient  Time out: Immediately prior to procedure a "time out" was called to verify the correct patient, procedure, equipment, support staff and site/side marked as required.  Indications for incision and drainage: infected wound.  Location: abdominal.  Anesthesia: local infiltration    Anesthesia:  Anesthesia: local infiltration  Local Anesthetic: lidocaine 1% without epinephrine   Anesthetic total: 5 mL  Patient sedated: no  Description of findings: necrotic tissue with shallow ulcer   Risk factor: coagulopathy  Scalpel size: 10  Complexity: simple  Drainage: bloody  Drainage amount: scant  Wound treatment: wound left open  Packing " material: none  Technical procedures used: incisional sharp debridement of shallow ulcer using 15 blade scalper - circular 2 cm in diameter 3 mm deep  Complications: No  Estimated blood loss (mL): 2  Specimens: Yes  Implants: No  Patient tolerance: Patient tolerated the procedure well with no immediate complications          Esther Lucero  4/7/2017

## 2017-04-07 NOTE — ASSESSMENT & PLAN NOTE
With chronic anticoagulation with Coumadin-  Telemetry  Home coumadin on hold  for debridement   Continue full dose lovenox

## 2017-04-07 NOTE — PHYSICIAN QUERY
PT Name: Nelly Tian  MR #: [id number]     Physician Query Form - Documentation Clarification      CDS/: Nadege Perez RN                 Contact information:    This form is a permanent document in the medical record.     Query Date: April 7, 2017    By submitting this query, we are merely seeking further clarification of documentation. Please utilize your independent clinical judgment when addressing the question(s) below.    The Medical record reflects the following:    Supporting Clinical Findings Location in Medical Record   Cellulitis of abdominal wound resolving.    GNR from wound debridement now likely to be true pathogen. Wounds do not look infected.     - F/u GNR and will likely change to oral abx tomorrow     4/9 ID note   * Sepsis   Infected abdominal wound-      ID following- Currently on ancef, vancomycin, and diflucan   BC x 2 no growth so far   Urine culture no growth    Abdominal wound culture GNR- final culture pending       Pt S/p abdominal wound  Debridement 4/07/17              Aerobic Bacterial Culture GRAM NEGATIVE BRUNO  Few  Identification and susceptibility pending  P   Aerobic Bacterial Culture GRAM NEGATIVE BRUNO  Few  Identification and susceptibility pending  P        4/ ID notes                                4/7 Abdominal decubitus culture                                                                            Doctor, please clarify if sepsis is related to:    (  x ) Gram negative organism    (    ) other_________________________________________    (   ) Unable to determine

## 2017-04-07 NOTE — ASSESSMENT & PLAN NOTE
Got lovenox this morning  Plan to debride the small area of necrosis in ulcer this afternoon  Care as per medicine

## 2017-04-07 NOTE — ASSESSMENT & PLAN NOTE
Infected abdominal wound-  Monitor closely  ID following- Currently on ancef, vancomycin, and diflucan  BC x 2 no growth so far  Continue wound care   Surgeon consulted for possible debridement

## 2017-04-07 NOTE — ASSESSMENT & PLAN NOTE
Suspect this is a cellulitis from the skin breakdown on her pannus.  Unlikely panniculitis.  Would continue IV antibiotics and monitor for improvement.  I do not suspect and abscess in this area as there are no clinical signs of this.  Continue local wound care.  Will monitor and consider debridement of locally necrotic tissue.

## 2017-04-07 NOTE — SUBJECTIVE & OBJECTIVE
No current facility-administered medications on file prior to encounter.      Current Outpatient Prescriptions on File Prior to Encounter   Medication Sig    acetaminophen (TYLENOL) 325 MG tablet Take 2 tablets (650 mg total) by mouth every 6 (six) hours as needed.    albuterol (ACCUNEB) 0.63 mg/3 mL Nebu INHALE THE CONTENTS OF 1 VIAL  BY  NEBULIZER EVERY 6 HOURS AS NEEDED    albuterol (VENTOLIN HFA) 90 mcg/actuation inhaler INHALE TWO PUFFS BY MOUTH EVERY 6 HOURS AS NEEDED FOR WHEEZING    albuterol-ipratropium 2.5mg-0.5mg/3mL (DUO-NEB) 0.5 mg-3 mg(2.5 mg base)/3 mL nebulizer solution INHALE THE CONTENTS OF 1 VIAL VIA NEBULIZER EVERY 6 HOURS AS NEEDED FOR  WHEEZING (DO NOT USE WITH ALBUTEROL INHALER)    ascorbic acid, vitamin C, (VITAMIN C) 500 MG tablet Take 1 tablet (500 mg total) by mouth every evening. (Patient taking differently: Take 500 mg by mouth 2 (two) times daily. )    atorvastatin (LIPITOR) 20 MG tablet Take 1 tablet (20 mg total) by mouth once daily.    budesonide (PULMICORT) 0.5 mg/2 mL nebulizer solution INHALE THE CONTENTS OF 1 VIAL VIA NEBULIZER EVERY DAY (Patient taking differently: INHALE THE CONTENTS OF 1 VIAL VIA NEBULIZER Three times EVERY DAY as needed)    clonazePAM (KLONOPIN) 1 MG tablet Take 1 tablet (1 mg total) by mouth 2 (two) times daily as needed for Anxiety.    escitalopram oxalate (LEXAPRO) 10 MG tablet TAKE 1 TABLET ONE TIME DAILY    furosemide (LASIX) 40 MG tablet Take 1 tablet (40 mg total) by mouth 2 (two) times daily.    gabapentin (NEURONTIN) 600 MG tablet Take 1 tablet (600 mg total) by mouth 3 (three) times daily.    hydrocodone-acetaminophen 10-325mg (NORCO)  mg Tab Take 1 tablet by mouth every 6 (six) hours as needed. (Patient taking differently: Take 1 tablet by mouth every 6 (six) hours as needed for Pain. )    levalbuterol (XOPENEX) 0.63 mg/3 mL nebulizer solution INHALE THE CONTENTS OF 1 VIAL BY NEBULIZATION 3 (THREE) TIMES DAILY.    lisinopril  (PRINIVIL,ZESTRIL) 20 MG tablet TAKE 1 TABLET ONE TIME DAILY    magnesium hydroxide 400 mg/5 ml (MILK OF MAGNESIA) 400 mg/5 mL Susp Take 30 mLs by mouth daily as needed.    metformin (GLUCOPHAGE) 500 MG tablet TAKE 1 TABLET TWICE DAILY WITH MEALS    methocarbamol (ROBAXIN) 750 MG Tab TAKE 1 TABLET FOUR TIMES DAILY    metoprolol succinate (TOPROL-XL) 25 MG 24 hr tablet Take 0.5 tablets (12.5 mg total) by mouth once daily. (Patient taking differently: Take 12.5 mg by mouth once daily. On hold waiting for dr visit)    miconazole NITRATE 2 % (MICOTIN) 2 % top powder Apply topically 2 (two) times daily. Skin folds    multivitamin (THERAGRAN) tablet Take 1 tablet by mouth once daily.    oxycodone (ROXICODONE) 10 mg Tab immediate release tablet Take 1 tablet (10 mg total) by mouth every 12 (twelve) hours as needed for Pain.    polyethylene glycol (GLYCOLAX) 17 gram/dose powder MIX 17 GRAMS IN LIQUID AND DRINK EVERY DAY AS NEEDED    potassium chloride SA (K-DUR,KLOR-CON) 20 MEQ tablet Take 1 tablet (20 mEq total) by mouth once daily.    SANTYL ointment APPLY  OINTMENT TOPICALLY TO AFFECTED AREA ONCE DAILY (Patient taking differently: APPLY  OINTMENT TOPICALLY TO left thigh AFFECTED AREA ONCE DAILY)    SPIRIVA WITH HANDIHALER 18 mcg inhalation capsule INHALE THE CONTENTS OF 1 CAPSULE EVERY DAY    temazepam (RESTORIL) 15 mg Cap TAKE 1 CAPSULE ONE TIME DAILY (Patient taking differently: TAKE 1 CAPSULE HS PRN insomnia)    warfarin (COUMADIN) 5 MG tablet TAKE ONE TABLET EVERY DAY OR AS DIRECTED BY COUMADIN CLINIC (Patient taking differently: 5 mg night DAILY)       Review of patient's allergies indicates:   Allergen Reactions    Dilaudid [hydromorphone] Anaphylaxis     Other reaction(s): Anaphylaxis  Other reaction(s): Unknown       Past Medical History:   Diagnosis Date    *Atrial flutter     Anxiety     Arthritis     Asthma     Atrial fibrillation     Back pain     Cataract     OD    CHF (congestive heart  failure)     COPD (chronic obstructive pulmonary disease)     Depression     Diabetes mellitus     Emphysema of lung     Heart failure     Hernia     Hypercapnic respiratory failure, chronic 2016    Hyperlipidemia     Hypertension     Iron deficiency anemia 2/3/2016    Myocardial infarction     Obesity     Peripheral vascular disease     Pneumonia     Polyneuropathy     Retinal detachment     OS    Skin ulcer 3/18/2017    Tobacco dependence     Type II 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, not stated as uncontrolled      Past Surgical History:   Procedure Laterality Date    CATARACT EXTRACTION Left     OS      SECTION      x2    EYE SURGERY      HYSTERECTOMY      OOPHORECTOMY      one ovary conserved    RETINAL DETACHMENT SURGERY      buckle --OS    TONSILLECTOMY       Family History     Problem Relation (Age of Onset)    Alcohol abuse Mother    Arthritis Father, Sister    Blindness Son    Cancer Brother    Crohn's disease Sister    Early death Sister (30)    Heart disease Father, Sister (32), Sister    No Known Problems Brother, Daughter        Social History Main Topics    Smoking status: Former Smoker     Packs/day: 0.25     Years: 50.00     Types: Cigarettes     Start date: 1968     Quit date: 2015    Smokeless tobacco: Former User     Quit date: 2017      Comment: 1 ppd for 20 years    Alcohol use No    Drug use: No    Sexual activity: Not Currently     Review of Systems   Constitutional: Positive for activity change and fatigue. Negative for chills and fever.   HENT: Negative for ear pain and hearing loss.    Eyes: Negative for pain and redness.   Respiratory: Positive for cough. Negative for apnea, choking, chest tightness, shortness of breath and stridor.    Cardiovascular: Positive for leg swelling. Negative for chest pain and palpitations.   Gastrointestinal: Positive for abdominal pain. Negative for blood in  stool, constipation, diarrhea, nausea and vomiting.   Endocrine: Negative for polydipsia and polyphagia.   Genitourinary: Negative for difficulty urinating, dysuria, flank pain and hematuria.   Musculoskeletal: Positive for arthralgias and gait problem. Negative for neck pain and neck stiffness.   Skin: Positive for color change and wound.        Abdominal wound to left abdomen with ulcerated areas with significant surrounding cellulitis   Allergic/Immunologic: Negative for food allergies.   Neurological: Positive for weakness. Negative for syncope and speech difficulty.   Hematological: Bruises/bleeds easily.   Psychiatric/Behavioral: Negative for confusion, decreased concentration and suicidal ideas.     Objective:     Vital Signs (Most Recent):  Temp: 98.3 °F (36.8 °C) (04/06/17 1700)  Pulse: 60 (04/06/17 2031)  Resp: 18 (04/06/17 2031)  BP: (!) 111/53 (04/06/17 1700)  SpO2: 96 % (04/06/17 2031) Vital Signs (24h Range):  Temp:  [97.9 °F (36.6 °C)-98.5 °F (36.9 °C)] 98.3 °F (36.8 °C)  Pulse:  [60-78] 60  Resp:  [14-18] 18  SpO2:  [92 %-100 %] 96 %  BP: ()/() 111/53     Weight: (!) 142 kg (313 lb)  Body mass index is 47.59 kg/(m^2).    Physical Exam   Constitutional: She is oriented to person, place, and time. She appears well-developed. No distress.   Morbidly obese   HENT:   Head: Normocephalic and atraumatic.   Eyes: Conjunctivae and EOM are normal. Pupils are equal, round, and reactive to light. Right eye exhibits no discharge. Left eye exhibits no discharge.   Neck: Normal range of motion. Neck supple.   Cardiovascular: Normal rate, regular rhythm and intact distal pulses.    Pulmonary/Chest: Effort normal. No stridor. No respiratory distress.   BBS diminished   Abdominal: Soft. Bowel sounds are normal. There is no tenderness. There is no rebound and no guarding.   Large abdominal wall hernia to the left of abdomen   Genitourinary:   Genitourinary Comments: Not examined   Musculoskeletal: Normal  range of motion. She exhibits no edema.   Neurological: She is alert and oriented to person, place, and time. No cranial nerve deficit.   Skin: Skin is warm and dry. She is not diaphoretic. There is erythema.   Abdominal wound to left abdomen with ulcerated areas with significant surrounding erythema.  Appears to be improving from demarcated border, and emanating from one of the ulcerated areas on her panus.  Ulcerated areas with some necrotic debris- see picture from ED   Psychiatric: She has a normal mood and affect. Her behavior is normal. Judgment and thought content normal.       Significant Labs:  CBC:   Recent Labs  Lab 04/06/17 0457   WBC 11.30   RBC 3.89*   HGB 9.7*   HCT 31.5*      MCV 81*   MCH 25.0*   MCHC 30.9*     BMP:   Recent Labs  Lab 04/06/17 0457   *      K 3.4*   CL 99   CO2 31*   BUN 11   CREATININE 0.6   CALCIUM 8.5*   MG 2.1     CMP:   Recent Labs  Lab 04/05/17  1500 04/06/17 0457   * 129*   CALCIUM 9.2 8.5*   ALBUMIN 3.0*  --    PROT 7.6  --     139   K 4.3 3.4*   CO2 30* 31*   CL 97 99   BUN 10 11   CREATININE 0.7 0.6   ALKPHOS 88  --    ALT 11  --    AST 19  --    BILITOT 0.6  --        Significant Diagnostics:  none

## 2017-04-07 NOTE — PROGRESS NOTES
Progress Note  Infectious Disease    Admit Date: 4/5/2017   LOS: 2 days     SUBJECTIVE:     Follow-up For:  Cellulitis of the abdominal wall     Antibiotics     Start     Stop Route Frequency Ordered    04/06/17 0100  vancomycin (VANCOCIN) 1,500 mg in dextrose 5 % 250 mL IVPB      -- IV Every 8 hours (non-standard times) 04/05/17 2105    04/06/17 1815  cefazolin (ANCEF) 2 gram in dextrose 5% 50 mL IVPB (premix)      -- IV Every 8 hours (non-standard times) 04/06/17 1703          Review of Systems:  Much better today, erythema is better. Dr. Lucero to do bedside debridement of the ulcers today.       OBJECTIVE:     Vital Signs (Most Recent)  Temp: 97.6 °F (36.4 °C) (04/07/17 1620)  Pulse: 74 (04/07/17 1620)  Resp: 20 (04/07/17 1620)  BP: (!) 108/48 (04/07/17 1620)  SpO2: 99 % (04/07/17 1620)    Temperature Range Min/Max (Last 24H):  Temp:  [97.3 °F (36.3 °C)-98.5 °F (36.9 °C)]     I & O (Last 24H):  Intake/Output Summary (Last 24 hours) at 04/07/17 1733  Last data filed at 04/07/17 0600   Gross per 24 hour   Intake             1000 ml   Output             1800 ml   Net             -800 ml       Physical Exam:  Morbidly obese,   Erythema on panus is better, still has candidal rash in the groin and skin folds. Multiple shallow ulcerations on left side of the panus   Alert and oriented, nad, eomi     Lines/Drains:       Peripheral IV - Single Lumen 04/06/17 2039 Right Forearm (Active)   Site Assessment Clean;Dry;Intact;No redness;No swelling 4/7/2017  8:00 AM   Line Status Infusing 4/7/2017  8:00 AM   Dressing Status Clean;Dry;Intact 4/7/2017  8:00 AM       Laboratory:  CBC    Recent Labs  Lab 04/07/17 0813   WBC 10.70   RBC 4.09   HGB 10.1*   HCT 32.8*      MCV 80*   MCH 24.7*   MCHC 30.8*     BMP    Recent Labs  Lab 04/07/17  0813      K 3.7   CL 99   CO2 30*   BUN 8   CREATININE 0.7   CALCIUM 8.9     Microbiology Results (last 7 days)     Procedure Component Value Units Date/Time    Aerobic culture  [[id number]] Collected:  04/07/17 1717    Order Status:  Sent Specimen:  Decubitus from Abdomen Updated:  04/07/17 1719    Culture, Anaerobic [[id number]] Collected:  04/07/17 1717    Order Status:  Sent Specimen:  Decubitus from Abdomen Updated:  04/07/17 1719    Urine culture [[id number]] Collected:  04/06/17 0100    Order Status:  Completed Specimen:  Urine from Urine, Clean Catch Updated:  04/07/17 1240     Urine Culture, Routine No growth    Blood culture [[id number]] Collected:  04/05/17 1517    Order Status:  Completed Specimen:  Blood Updated:  04/06/17 2312     Blood Culture, Routine No Growth to date     Blood Culture, Routine No Growth to date    Narrative:       Aerobic and anaerobic    Blood culture [[id number]] Collected:  04/05/17 1515    Order Status:  Completed Specimen:  Blood Updated:  04/06/17 2312     Blood Culture, Routine No Growth to date     Blood Culture, Routine No Growth to date    Narrative:       Aerobic and anaerobic    Urine culture [[id number]]     Order Status:  Canceled Specimen:  Urine           ASSESSMENT/PLAN:     Active Hospital Problems    Diagnosis  POA    *Sepsis [A41.9]  Yes    Hypophosphatemia [E83.39]  Yes    Hypokalemia [E87.6]  Yes    Microcytic anemia [D50.9]  Yes    Cellulitis, abdominal wall [L03.311]  Yes    Infected wound [T14.8, L08.9]  Yes     Abdominal      COPD (chronic obstructive pulmonary disease) [J44.9]  Yes    Hypomagnesemia [E83.42]  Yes    Nonhealing skin ulcer, limited to breakdown of skin [L98.491]  Yes    Chronic respiratory failure with hypoxia [J96.11]  Yes    Hyperlipidemia [E78.5]  Yes    Chronic combined systolic and diastolic congestive heart failure [I50.42]  Yes    Hypertension associated with diabetes [E11.59, I10]  Yes    Morbid obesity with BMI of 45.0-49.9, adult [E66.01, Z68.42]  Not Applicable    PVD (peripheral vascular disease) [I73.9]  Yes    Chronic atrial fibrillation [I48.2]  Yes    GERD (gastroesophageal reflux  disease) [K21.9]  Yes    Chronic obstructive pulmonary disease with acute exacerbation [J44.1]  Yes    Major depression, recurrent, chronic [F33.9]  Yes    Long term current use of anticoagulant therapy [Z79.01]  Not Applicable    Diabetes mellitus with neuropathy [E11.40]  Yes     Chronic      Resolved Hospital Problems    Diagnosis Date Resolved POA   No resolved problems to display.     - continue vanc and ancef. Will likely be able to change to oral antibiotics soon  - continue Diflucan for candidal rash.   - will follow. Please call with questions.

## 2017-04-07 NOTE — PROGRESS NOTES
[facility] Hospital Medicine  Progress Note    Patient Name: Nelly Tian  MRN: [id number]  Patient Class: IP- Inpatient   Admission Date: 2017  Length of Stay: 2 days  Attending Physician: Micki Davies MD  Primary Care Provider: Constantine Bird MD        Subjective:     Principal Problem:Sepsis    HPI:  The patient is a 66 y.o. female with hx of a chronic left abdominal wound, who is presenting with acute onset fever of 103 PTA  and dyspnea which began today. Pt was sent to ER by [facility] home health nurse. Pt reports she does not feel well, and has no other associated complaints. She was recently hospitalized for her left sided abdominal wound and finished a round of Augmentin. Pt reports her granddaughter was sick with a cough recently. There are no other complaints at this time. She has a past medical history of *Atrial flutter; Anxiety; Arthritis; Asthma; Atrial fibrillation; Back pain; Cataract; CHF (congestive heart failure); COPD (chronic obstructive pulmonary disease); Depression; Diabetes mellitus; Emphysema of lung; Heart failure; Hernia; Hypercapnic respiratory failure, chronic (2016); Hyperlipidemia; Hypertension; Iron deficiency anemia (2/3/2016); Myocardial infarction; Obesity; Peripheral vascular disease; Pneumonia; Polyneuropathy; Retinal detachment; Skin ulcer (3/18/2017); Tobacco dependence; and Type II 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, not stated as uncontrolled. She has a past surgical history that includes  section; Oophorectomy; Retinal detachment surgery; Cataract extraction (Left); Eye surgery; Hysterectomy; and Tonsillectomy. Pt was evaluated in ER and found to have infected abdominal wound with cellulitis and sepsis and admitted for further evaluation and treatment.    Hospital Course:  Continue current treatment. Continue Pt/OT  for debility. Pt for I&D today.     No new subjective & objective note has been  filed under this hospital service since the last note was generated.    Assessment/Plan:      * Sepsis  Infected abdominal wound-  Monitor closely  ID following- Currently on ancef, vancomycin, and diflucan  BC x 2 no growth so far  Continue wound care   Surgeon consulted for possible debridement      Diabetes mellitus with neuropathy  2000 ADA diet  Accuchecks with correctional SSI  Continue home metformin and gabapentin  Blood sugars running 103-134    Chronic obstructive pulmonary disease with acute exacerbation  Monitor O2 sats  Supplemental O2  Continue pulmicort and aerosol treatments      Major depression, recurrent, chronic  Continue home escitalopram      GERD (gastroesophageal reflux disease)  Continue home PPI      Chronic atrial fibrillation  With chronic anticoagulation with Coumadin-  Telemetry  Home coumadin on hold  for debridement   Continue full dose lovenox      Hypertension associated with diabetes  Hold home lisinopril for now  Monitor blood pressure       Morbid obesity with BMI of 45.0-49.9, adult  Encourage increased activity as tolerated and diet compliance      PVD (peripheral vascular disease)  Continue statin          Chronic combined systolic and diastolic congestive heart failure  Telemetry  Stable  Continue home lasix  Supplemental O2 as needed        Hyperlipidemia  Continue home statin      Chronic respiratory failure with hypoxia  As above  Continue home O2      Hypomagnesemia  Monitor  Supplement as needed      Hypophosphatemia  Monitor  Supplement as needed      Hypokalemia  Monitor  Supplement as needed      Microcytic anemia  Continue home MVI and pm vitamin C      VTE Risk Mitigation     None          DARIEN Bobo  Department of Hospital Medicine   [facility]    Time spent seeing patient( greater than 1/2 spent in direct contact) : 27 minutes

## 2017-04-07 NOTE — PT/OT/SLP PROGRESS
Physical Therapy  Treatment    Nelly Tian   MRN: [id number]   Admitting Diagnosis: Sepsis    PT Received On: 17  PT Start Time: 1011     PT Stop Time: 1020    PT Total Time (min): 9 min       Billable Minutes:  Gait [id number]    Treatment Type: Treatment  PT/PTA: PTA     PTA Visit Number: 1       General Precautions: Standard, fall  Orthopedic Precautions: N/A   Braces:           Subjective:  Communicated with nurse Victoria prior to session.  Agreed to ambulate.    Pain Ratin/10  Location - Side: Left     Location: abdomen  Pain Addressed: Pre-medicate for activity       Objective:   Patient found with: telemetry, oxygen, peripheral IV    Functional Mobility:  Bed Mobility:   Rolling/Turning to Left: Supervision  Scooting/Bridging: Supervision  Supine to Sit: Contact Guard Assistance  Sit to Supine: Contact Guard Assistance    Transfers:  Sit <> Stand Assistance: Supervision  Sit <> Stand Assistive Device: No Assistive Device    Gait:   Gait Distance: 80' with O2 attached  Assistance 1: Supervision  Gait Assistive Device: No device (pushed IV along)  Gait Pattern: 2-point gait  Gait Deviation(s): decreased madhav, decreased step length    Stairs:      Balance:   Static Sit: GOOD: Takes MODERATE challenges from all directions  Dynamic Sit: GOOD-: Maintains balance through MODERATE excursions of active trunk movement,     Static Stand: FAIR+: Takes MINIMAL challenges from all directions  Dynamic stand: FAIR+: Needs CLOSE SUPERVISION during gait and is able to right self with minor LOB     Therapeutic Activities and Exercises:  Ambulated 80' with S , pushing IV pole with O2 attached.     AM-PAC 6 CLICK MOBILITY  How much help from another person does this patient currently need?   1 = Unable, Total/Dependent Assistance  2 = A lot, Maximum/Moderate Assistance  3 = A little, Minimum/Contact Guard/Supervision  4 = None, Modified [place]/Independent         AM-PAC Raw Score CMS G-Code Modifier Level  of Impairment Assistance   6 % Total / Unable   7 - 9 CM 80 - 100% Maximal Assist   10 - 14 CL 60 - 80% Moderate Assist   15 - 19 CK 40 - 60% Moderate Assist   20 - 22 CJ 20 - 40% Minimal Assist   23 CI 1-20% SBA / CGA   24 CH 0% Independent/ Mod I     Patient left seated EOB with all lines intact, call button in reach and nurse Victoria. notified.    Assessment:  Nelly Tian is a 66 y.o. female with a medical diagnosis of Sepsis and presents with weakness, limited endurance.    Rehab identified problem list/impairments: Rehab identified problem list/impairments: weakness, impaired endurance, impaired functional mobilty, impaired cardiopulmonary response to activity    Rehab potential is fair.    Activity tolerance: Fair    Discharge recommendations: Discharge Facility/Level Of Care Needs: home with home health     Barriers to discharge:      Equipment recommendations:       GOALS:   Physical Therapy Goals        Problem: Physical Therapy Goal    Goal Priority Disciplines Outcome Goal Variances Interventions   Physical Therapy Goal    High PT/OT, PT Ongoing (interventions implemented as appropriate)     Description:  Goals to be met by: 2017     Patient will increase functional independence with mobility by performin. Bed to chair transfer with Supervision using Rolling Walker  2. Gait  x 100 feet with Contact Guard Assistance using Rolling Walker.   3. Lower extremity exercise program x20 reps per handout, with assistance as needed                PLAN:    Patient to be seen 6 x/week  to address the above listed problems via gait training, therapeutic activities, therapeutic exercises  Plan of Care expires: 17  Plan of Care reviewed with: patient         Prema Sher, MONIKA  2017

## 2017-04-07 NOTE — SUBJECTIVE & OBJECTIVE
Interval History: no acute events    Medications:  Continuous Infusions:   Scheduled Meds:   albuterol-ipratropium 2.5mg-0.5mg/3mL  3 mL Nebulization Q4H    ascorbic acid (vitamin C)  500 mg Oral QHS    atorvastatin  20 mg Oral Daily    budesonide  0.5 mg Nebulization Q12H    ceFAZolin (ANCEF) IVPB  2 g Intravenous Q8H    enoxaparin  140 mg Subcutaneous Q12H    escitalopram oxalate  10 mg Oral Daily    fluconazole  200 mg Oral Q24H    furosemide  20 mg Intravenous Once    furosemide  40 mg Oral BID    gabapentin  600 mg Oral TID    magnesium oxide  400 mg Oral BID    metformin  500 mg Oral BID WM    metoprolol succinate  12.5 mg Oral Daily    miconazole NITRATE 2 %   Topical BID    multivitamin  1 tablet Oral Daily    pantoprazole  40 mg Oral Daily    polyethylene glycol  17 g Oral Daily    potassium chloride SA  20 mEq Oral Daily    potassium, sodium phosphates  1 packet Oral QID (WM & HS)    vancomycin (VANCOCIN) IVPB  1,500 mg Intravenous Q8H    zinc sulfate  220 mg Oral Daily     PRN Meds:acetaminophen, clonazePAM, dextrose 50%, dextrose 50%, glucagon (human recombinant), glucose, glucose, hydrocodone-acetaminophen 10-325mg, insulin aspart, magnesium hydroxide 400 mg/5 ml, methocarbamol, ondansetron, oxycodone, ramelteon, senna-docusate 8.6-50 mg     Review of patient's allergies indicates:   Allergen Reactions    Dilaudid [hydromorphone] Anaphylaxis     Other reaction(s): Anaphylaxis  Other reaction(s): Unknown     Objective:     Vital Signs (Most Recent):  Temp: 98.3 °F (36.8 °C) (04/07/17 1233)  Pulse: 65 (04/07/17 1233)  Resp: 18 (04/07/17 1233)  BP: (!) 127/51 (04/07/17 1233)  SpO2: 99 % (04/07/17 1233) Vital Signs (24h Range):  Temp:  [97.3 °F (36.3 °C)-98.5 °F (36.9 °C)] 98.3 °F (36.8 °C)  Pulse:  [60-85] 65  Resp:  [16-18] 18  SpO2:  [84 %-100 %] 99 %  BP: (105-127)/(47-56) 127/51     Weight: (!) 142 kg (313 lb)  Body mass index is 47.59 kg/(m^2).    Intake/Output - Last 3 Shifts        04/05 0700 - 04/06 0659 04/06 0700 - 04/07 0659 04/07 0700 - 04/08 0659    P.O.  450     IV Piggyback  900     Total Intake(mL/kg)  1350 (9.5)     Urine (mL/kg/hr) 450 2250 (0.7)     Total Output 450 2250      Net -450 -900             Urine Occurrence 2 x 2 x     Stool Occurrence   1 x          Physical Exam   Constitutional: She is oriented to person, place, and time. She appears well-developed and well-nourished. No distress.   HENT:   Head: Normocephalic and atraumatic.   Mouth/Throat: No oropharyngeal exudate.   Eyes: Conjunctivae and EOM are normal. Pupils are equal, round, and reactive to light. No scleral icterus.   Neck: Normal range of motion. Neck supple. No JVD present. No tracheal deviation present. No thyromegaly present.   Cardiovascular: Normal rate, regular rhythm and normal heart sounds.  Exam reveals no gallop and no friction rub.    No murmur heard.  Pulmonary/Chest: Effort normal and breath sounds normal. No stridor. No respiratory distress. She has no wheezes. She has no rales. She exhibits no tenderness.   Abdominal: Soft. Bowel sounds are normal. She exhibits no distension and no mass. There is no tenderness. There is no rebound and no guarding.   Erythema much improved around ulceration, nearly resolved but some remains.  Necrotic area with ulcerated region remain but very small   Musculoskeletal: Normal range of motion. She exhibits edema. She exhibits no tenderness.   Lymphadenopathy:     She has no cervical adenopathy.   Neurological: She is alert and oriented to person, place, and time. No cranial nerve deficit.   Skin: Skin is warm and dry. No rash noted. She is not diaphoretic. No erythema.   Psychiatric: She has a normal mood and affect. Her behavior is normal.   Nursing note and vitals reviewed.      Significant Labs:  CBC:   Recent Labs  Lab 04/07/17  0813   WBC 10.70   RBC 4.09   HGB 10.1*   HCT 32.8*      MCV 80*   MCH 24.7*   MCHC 30.8*     BMP:   Recent Labs  Lab  04/06/17  0457 04/07/17  0813   * 111*    141   K 3.4* 3.7   CL 99 99   CO2 31* 30*   BUN 11 8   CREATININE 0.6 0.7   CALCIUM 8.5* 8.9   MG 2.1  --        Significant Diagnostics:  none

## 2017-04-07 NOTE — PLAN OF CARE
04/06/17 2024   Patient Assessment/Suction   Level of Consciousness (AVPU) alert   Respiratory Effort Unlabored   Expansion/Accessory Muscles/Retractions expansion symmetric   All Lung Fields Breath Sounds diminished   Rhythm/Pattern, Respiratory pattern irregular   Cough Frequency infrequent   Cough Type good   PRE-TX-O2-ETCO2   O2 Device (Oxygen Therapy) nasal cannula   $ Is the patient on Oxygen? Yes   Flow (L/min) 3   SpO2 96 %   Pulse Oximetry Type Intermittent   $ Pulse Oximetry - Multiple Charge Pulse Oximetry - Multiple   Pulse 68   Resp 18   Positioning HOB elevated 30 degrees   Aerosol Therapy   $ Aerosol Therapy Charges Aerosol Treatment   Respiratory Treatment Status given   SVN/Inhaler Treatment Route mask   Position During Treatment HOB at 30 degrees   Patient Tolerance good   Post-Treatment   Post-treatment Heart Rate (beats/min) 68   Post-treatment Resp Rate (breaths/min) 18   All Fields Breath Sounds unchanged

## 2017-04-07 NOTE — NURSING
enoxaparing 100mg is showing incomplete. Could not cancel entry because it cancel by pharmacy. lovenox was not  administered return to [facility].  Pharmacy brought the right dose of enaxaparin which was given to the pt.

## 2017-04-08 LAB
INR PPP: 1.3
POCT GLUCOSE: 100 MG/DL (ref 70–110)
POCT GLUCOSE: 118 MG/DL (ref 70–110)
POCT GLUCOSE: 129 MG/DL (ref 70–110)
POCT GLUCOSE: 131 MG/DL (ref 70–110)
PROTHROMBIN TIME: 13.4 SEC
VANCOMYCIN TROUGH SERPL-MCNC: 19.9 UG/ML

## 2017-04-08 PROCEDURE — 97116 GAIT TRAINING THERAPY: CPT

## 2017-04-08 PROCEDURE — 25000003 PHARM REV CODE 250: Performed by: NURSE PRACTITIONER

## 2017-04-08 PROCEDURE — 25000003 PHARM REV CODE 250: Performed by: INTERNAL MEDICINE

## 2017-04-08 PROCEDURE — 80202 ASSAY OF VANCOMYCIN: CPT

## 2017-04-08 PROCEDURE — 94761 N-INVAS EAR/PLS OXIMETRY MLT: CPT

## 2017-04-08 PROCEDURE — 25000242 PHARM REV CODE 250 ALT 637 W/ HCPCS: Performed by: NURSE PRACTITIONER

## 2017-04-08 PROCEDURE — 25000003 PHARM REV CODE 250: Performed by: SURGERY

## 2017-04-08 PROCEDURE — 27000221 HC OXYGEN, UP TO 24 HOURS

## 2017-04-08 PROCEDURE — 63600175 PHARM REV CODE 636 W HCPCS: Performed by: INTERNAL MEDICINE

## 2017-04-08 PROCEDURE — 36415 COLL VENOUS BLD VENIPUNCTURE: CPT

## 2017-04-08 PROCEDURE — 25000003 PHARM REV CODE 250: Performed by: EMERGENCY MEDICINE

## 2017-04-08 PROCEDURE — 85610 PROTHROMBIN TIME: CPT

## 2017-04-08 PROCEDURE — 25000003 PHARM REV CODE 250: Performed by: HOSPITALIST

## 2017-04-08 PROCEDURE — 11000001 HC ACUTE MED/SURG PRIVATE ROOM

## 2017-04-08 PROCEDURE — 94640 AIRWAY INHALATION TREATMENT: CPT

## 2017-04-08 PROCEDURE — 63600175 PHARM REV CODE 636 W HCPCS: Performed by: HOSPITALIST

## 2017-04-08 RX ORDER — WARFARIN SODIUM 5 MG/1
5 TABLET ORAL DAILY
Status: DISCONTINUED | OUTPATIENT
Start: 2017-04-08 | End: 2017-04-11

## 2017-04-08 RX ADMIN — MAGNESIUM HYDROXIDE 2400 MG: 400 SUSPENSION ORAL at 10:04

## 2017-04-08 RX ADMIN — ATORVASTATIN CALCIUM 20 MG: 20 TABLET, FILM COATED ORAL at 08:04

## 2017-04-08 RX ADMIN — CEFAZOLIN SODIUM 2 G: 2 SOLUTION INTRAVENOUS at 05:04

## 2017-04-08 RX ADMIN — POLYETHYLENE GLYCOL (3350) 17 G: 17 POWDER, FOR SOLUTION ORAL at 08:04

## 2017-04-08 RX ADMIN — IPRATROPIUM BROMIDE AND ALBUTEROL SULFATE 3 ML: .5; 3 SOLUTION RESPIRATORY (INHALATION) at 07:04

## 2017-04-08 RX ADMIN — ENOXAPARIN SODIUM 140 MG: 150 INJECTION SUBCUTANEOUS at 05:04

## 2017-04-08 RX ADMIN — METFORMIN HYDROCHLORIDE 500 MG: 500 TABLET, FILM COATED ORAL at 08:04

## 2017-04-08 RX ADMIN — CLONAZEPAM 1 MG: 1 TABLET ORAL at 08:04

## 2017-04-08 RX ADMIN — METOPROLOL SUCCINATE 12.5 MG: 25 TABLET, EXTENDED RELEASE ORAL at 08:04

## 2017-04-08 RX ADMIN — Medication 220 MG: at 08:04

## 2017-04-08 RX ADMIN — COLLAGENASE SANTYL: 250 OINTMENT TOPICAL at 03:04

## 2017-04-08 RX ADMIN — Medication 400 MG: at 09:04

## 2017-04-08 RX ADMIN — PANTOPRAZOLE SODIUM 40 MG: 40 TABLET, DELAYED RELEASE ORAL at 08:04

## 2017-04-08 RX ADMIN — VANCOMYCIN HYDROCHLORIDE 1500 MG: 1 INJECTION, POWDER, LYOPHILIZED, FOR SOLUTION INTRAVENOUS at 10:04

## 2017-04-08 RX ADMIN — METFORMIN HYDROCHLORIDE 500 MG: 500 TABLET, FILM COATED ORAL at 05:04

## 2017-04-08 RX ADMIN — GABAPENTIN 600 MG: 300 CAPSULE ORAL at 06:04

## 2017-04-08 RX ADMIN — OXYCODONE HYDROCHLORIDE AND ACETAMINOPHEN 500 MG: 500 TABLET ORAL at 09:04

## 2017-04-08 RX ADMIN — BUDESONIDE 0.5 MG: 0.5 INHALANT RESPIRATORY (INHALATION) at 07:04

## 2017-04-08 RX ADMIN — FUROSEMIDE 40 MG: 40 TABLET ORAL at 05:04

## 2017-04-08 RX ADMIN — FLUCONAZOLE 200 MG: 100 TABLET ORAL at 05:04

## 2017-04-08 RX ADMIN — HYDROCODONE BITARTRATE AND ACETAMINOPHEN 1 TABLET: 10; 325 TABLET ORAL at 05:04

## 2017-04-08 RX ADMIN — POTASSIUM CHLORIDE 20 MEQ: 20 TABLET, EXTENDED RELEASE ORAL at 08:04

## 2017-04-08 RX ADMIN — CLONAZEPAM 1 MG: 1 TABLET ORAL at 10:04

## 2017-04-08 RX ADMIN — POTASSIUM & SODIUM PHOSPHATES POWDER PACK 280-160-250 MG 1 PACKET: 280-160-250 PACK at 01:04

## 2017-04-08 RX ADMIN — POTASSIUM & SODIUM PHOSPHATES POWDER PACK 280-160-250 MG 1 PACKET: 280-160-250 PACK at 08:04

## 2017-04-08 RX ADMIN — Medication: at 09:04

## 2017-04-08 RX ADMIN — HYDROCODONE BITARTRATE AND ACETAMINOPHEN 1 TABLET: 10; 325 TABLET ORAL at 08:04

## 2017-04-08 RX ADMIN — CEFAZOLIN SODIUM 2 G: 2 SOLUTION INTRAVENOUS at 01:04

## 2017-04-08 RX ADMIN — IPRATROPIUM BROMIDE AND ALBUTEROL SULFATE 3 ML: .5; 3 SOLUTION RESPIRATORY (INHALATION) at 12:04

## 2017-04-08 RX ADMIN — RAMELTEON 8 MG: 8 TABLET, FILM COATED ORAL at 09:04

## 2017-04-08 RX ADMIN — Medication 400 MG: at 08:04

## 2017-04-08 RX ADMIN — IPRATROPIUM BROMIDE AND ALBUTEROL SULFATE 3 ML: .5; 3 SOLUTION RESPIRATORY (INHALATION) at 11:04

## 2017-04-08 RX ADMIN — THERA TABS 1 TABLET: TAB at 08:04

## 2017-04-08 RX ADMIN — Medication: at 08:04

## 2017-04-08 RX ADMIN — IPRATROPIUM BROMIDE AND ALBUTEROL SULFATE 3 ML: .5; 3 SOLUTION RESPIRATORY (INHALATION) at 04:04

## 2017-04-08 RX ADMIN — ENOXAPARIN SODIUM 140 MG: 150 INJECTION SUBCUTANEOUS at 06:04

## 2017-04-08 RX ADMIN — CEFAZOLIN SODIUM 2 G: 2 SOLUTION INTRAVENOUS at 09:04

## 2017-04-08 RX ADMIN — WARFARIN SODIUM 5 MG: 5 TABLET ORAL at 05:04

## 2017-04-08 RX ADMIN — GABAPENTIN 600 MG: 300 CAPSULE ORAL at 09:04

## 2017-04-08 RX ADMIN — POTASSIUM & SODIUM PHOSPHATES POWDER PACK 280-160-250 MG 1 PACKET: 280-160-250 PACK at 05:04

## 2017-04-08 RX ADMIN — GABAPENTIN 600 MG: 300 CAPSULE ORAL at 01:04

## 2017-04-08 RX ADMIN — FUROSEMIDE 40 MG: 40 TABLET ORAL at 08:04

## 2017-04-08 RX ADMIN — IPRATROPIUM BROMIDE AND ALBUTEROL SULFATE 3 ML: .5; 3 SOLUTION RESPIRATORY (INHALATION) at 03:04

## 2017-04-08 NOTE — PROGRESS NOTES
[facility] Surgery  Progress Note    Subjective:     History of Present Illness:  65 y/o female who was sent here for fever and redness on her abdomen by her home health nurse.  She has been treated for cellulitis of her abdominal wall in the past.  She has develop ulceration of the abdomen now with redness warmth and fever.  She also notes that she has been around her daughter who may have had a cough.  She was admitted with SOB but does not complain of this to me.    Post-Op Info:  * No surgery found *             Medications:  Continuous Infusions:   Scheduled Meds:   albuterol-ipratropium 2.5mg-0.5mg/3mL  3 mL Nebulization Q4H    ascorbic acid (vitamin C)  500 mg Oral QHS    atorvastatin  20 mg Oral Daily    budesonide  0.5 mg Nebulization Q12H    ceFAZolin (ANCEF) IVPB  2 g Intravenous Q8H    collagenase   Topical Daily    enoxaparin  140 mg Subcutaneous Q12H    escitalopram oxalate  10 mg Oral Daily    fluconazole  200 mg Oral Q24H    furosemide  20 mg Intravenous Once    furosemide  40 mg Oral BID    gabapentin  600 mg Oral TID    lidocaine (PF) 10 mg/ml (1%)  1 mL Other Once    magnesium oxide  400 mg Oral BID    metformin  500 mg Oral BID WM    metoprolol succinate  12.5 mg Oral Daily    miconazole NITRATE 2 %   Topical BID    multivitamin  1 tablet Oral Daily    pantoprazole  40 mg Oral Daily    polyethylene glycol  17 g Oral Daily    potassium chloride SA  20 mEq Oral Daily    potassium, sodium phosphates  1 packet Oral QID (WM & HS)    vancomycin (VANCOCIN) IVPB  1,500 mg Intravenous Q12H    warfarin  5 mg Oral Daily    zinc sulfate  220 mg Oral Daily     PRN Meds:acetaminophen, clonazePAM, dextrose 50%, dextrose 50%, glucagon (human recombinant), glucose, glucose, hydrocodone-acetaminophen 10-325mg, insulin aspart, magnesium hydroxide 400 mg/5 ml, methocarbamol, ondansetron, oxycodone, ramelteon, senna-docusate 8.6-50 mg     Review of patient's  allergies indicates:   Allergen Reactions    Dilaudid [hydromorphone] Anaphylaxis     Other reaction(s): Anaphylaxis  Other reaction(s): Unknown     Objective:     Vital Signs (Most Recent):  Temp: 98.2 °F (36.8 °C) (04/08/17 1203)  Pulse: 71 (04/08/17 1203)  Resp: 19 (04/08/17 1203)  BP: (!) 135/48 (04/08/17 1203)  SpO2: (!) 94 % (04/08/17 1203) Vital Signs (24h Range):  Temp:  [97.5 °F (36.4 °C)-98.7 °F (37.1 °C)] 98.2 °F (36.8 °C)  Pulse:  [64-88] 71  Resp:  [16-22] 19  SpO2:  [84 %-100 %] 94 %  BP: (101-145)/(48-56) 135/48     Weight: (!) 142 kg (313 lb)  Body mass index is 47.59 kg/(m^2).    Intake/Output - Last 3 Shifts       04/06 0700 - 04/07 0659 04/07 0700 - 04/08 0659 04/08 0700 - 04/09 0659    P.O. 450  720    IV Piggyback 900 50 300    Total Intake(mL/kg) 1350 (9.5) 50 (0.4) 1020 (7.2)    Urine (mL/kg/hr) 2250 (0.7) 1200 (0.4)     Total Output 2250 1200      Net -900 -1150 +1020           Urine Occurrence 2 x      Stool Occurrence  1 x           Physical Exam   Constitutional: She is oriented to person, place, and time.   HENT:   Head: Normocephalic and atraumatic.   Eyes: No scleral icterus.   Cardiovascular: Normal rate and regular rhythm.    Pulmonary/Chest: Effort normal and breath sounds normal. No respiratory distress. She has no wheezes. She has no rales.   Abdominal: Soft. Bowel sounds are normal. She exhibits no distension. There is no tenderness.   Obese  Wound is clean. Minimal surrounding erythema.   Neurological: She is alert and oriented to person, place, and time.   Psychiatric: She has a normal mood and affect. Her behavior is normal.       Significant Labs:  CBC:   Recent Labs  Lab 04/07/17  0813   WBC 10.70   RBC 4.09   HGB 10.1*   HCT 32.8*      MCV 80*   MCH 24.7*   MCHC 30.8*           Assessment/Plan:     Nonhealing skin ulcer, limited to breakdown of skin  1. Continue local wound care.  2. Please call if needed.      Godwin Rojas MD  General Surgery  [facility]

## 2017-04-08 NOTE — PLAN OF CARE
Problem: Physical Therapy Goal  Goal: Physical Therapy Goal  Goals to be met by: 2017     Patient will increase functional independence with mobility by performin. Bed to chair transfer with Supervision using Rolling Walker  2. Gait x 100 feet with Contact Guard Assistance using Rolling Walker.   3. Lower extremity exercise program x20 reps per handout, with assistance as needed   Outcome: Ongoing (interventions implemented as appropriate)  Ambulated 100'  pushing IV pole and CGA with O2 attached.

## 2017-04-08 NOTE — SUBJECTIVE & OBJECTIVE
Interval History:  Continue current treatment.     Review of Systems   Constitutional: Positive for activity change and fatigue. Negative for chills and fever.   HENT: Negative for ear pain and hearing loss.    Eyes: Negative for pain and redness.   Respiratory: Negative for apnea, cough, choking, chest tightness, shortness of breath and stridor.    Cardiovascular: Negative for chest pain, palpitations and leg swelling.   Gastrointestinal: Positive for abdominal pain. Negative for blood in stool, constipation, diarrhea, nausea and vomiting.   Endocrine: Negative for polydipsia and polyphagia.   Genitourinary: Negative for difficulty urinating, dysuria, flank pain and hematuria.   Musculoskeletal: Positive for arthralgias and gait problem. Negative for neck pain and neck stiffness.   Skin: Positive for color change and wound.        Abdominal wound to left abdomen with decreased redness   Allergic/Immunologic: Negative for food allergies.   Neurological: Positive for weakness. Negative for syncope and speech difficulty.   Hematological: Bruises/bleeds easily.   Psychiatric/Behavioral: Negative for confusion, decreased concentration and suicidal ideas.     Objective:     Vital Signs (Most Recent):  Temp: 97.5 °F (36.4 °C) (04/08/17 0804)  Pulse: 76 (04/08/17 1129)  Resp: 20 (04/08/17 1129)  BP: (!) 145/54 (04/08/17 0804)  SpO2: 99 % (04/08/17 1129) Vital Signs (24h Range):  Temp:  [97.5 °F (36.4 °C)-98.7 °F (37.1 °C)] 97.5 °F (36.4 °C)  Pulse:  [62-88] 76  Resp:  [16-22] 20  SpO2:  [84 %-100 %] 99 %  BP: (101-145)/(48-56) 145/54     Weight: (!) 142 kg (313 lb)  Body mass index is 47.59 kg/(m^2).    Intake/Output Summary (Last 24 hours) at 04/08/17 1135  Last data filed at 04/08/17 0903   Gross per 24 hour   Intake              410 ml   Output             1200 ml   Net             -790 ml      Physical Exam   Constitutional: She is oriented to person, place, and time. She appears well-developed. No distress.   Morbidly  obese   HENT:   Head: Normocephalic and atraumatic.   Eyes: Conjunctivae and EOM are normal. Pupils are equal, round, and reactive to light. Right eye exhibits no discharge. Left eye exhibits no discharge.   Neck: Normal range of motion. Neck supple.   Cardiovascular: Normal rate, regular rhythm and intact distal pulses.    Pulmonary/Chest: Effort normal. No stridor. No respiratory distress.   BBS diminished   Abdominal: Soft. Bowel sounds are normal. There is tenderness. There is no rebound and no guarding.   Tenderness surrounding abdominal wound   Genitourinary:   Genitourinary Comments: Not examined   Musculoskeletal: Normal range of motion. She exhibits no edema.   Neurological: She is alert and oriented to person, place, and time. No cranial nerve deficit.   Skin: Skin is warm and dry. She is not diaphoretic. There is erythema.   Abdominal wound to left abdomen with decreased redness   Psychiatric: She has a normal mood and affect. Her behavior is normal. Judgment and thought content normal.       Significant Labs: Reviewed

## 2017-04-08 NOTE — ASSESSMENT & PLAN NOTE
2000 ADA diet  Accuchecks with correctional SSI  Continue home metformin and gabapentin  Blood sugars running 100-143

## 2017-04-08 NOTE — PT/OT/SLP PROGRESS
Physical Therapy  Treatment    Nelly Tian   MRN: [id number]   Admitting Diagnosis: Sepsis    PT Received On: 17  PT Start Time: 911     PT Stop Time: 921    PT Total Time (min): 10 min       Billable Minutes:  Gait [id number]    Treatment Type: Treatment  PT/PTA: PTA     PTA Visit Number: 2       General Precautions: Standard, fall  Orthopedic Precautions: N/A   Braces:           Subjective:  Communicated with nurse Victoria prior to session.  Agreed to ambulate.    Pain Ratin/10  Location - Side: Left     Location: abdomen  Pain Addressed: Pre-medicate for activity, Nurse notified       Objective:   Patient found with: telemetry, oxygen, peripheral IV    Functional Mobility:  Bed Mobility:        Transfers:  Sit <> Stand Assistance: Supervision  Sit <> Stand Assistive Device: No Assistive Device    Gait:   Gait Distance: 100' with O2 attached.  Assistance 1: Supervision  Gait Assistive Device: No device  Gait Pattern: 2-point gait  Gait Deviation(s): decreased madhav, decreased step length    Stairs:      Balance:   Static Sit: GOOD: Takes MODERATE challenges from all directions  Dynamic Sit: GOOD: Maintains balance through MODERATE excursions of active trunk movement  Static Stand: FAIR: Maintains without assist but unable to take challenges  Dynamic stand: FAIR: Needs CONTACT GUARD during gait     Therapeutic Activities and Exercises:  Seated EOB . Ambulated 100' pushing IV pole with O2 attached. Sat up in chair following.     AM-PAC 6 CLICK MOBILITY  How much help from another person does this patient currently need?   1 = Unable, Total/Dependent Assistance  2 = A lot, Maximum/Moderate Assistance  3 = A little, Minimum/Contact Guard/Supervision  4 = None, Modified [place]/Independent         AM-PAC Raw Score CMS G-Code Modifier Level of Impairment Assistance   6 % Total / Unable   7 - 9 CM 80 - 100% Maximal Assist   10 - 14 CL 60 - 80% Moderate Assist   15 - 19 CK 40 - 60% Moderate  Assist   20 - 22 CJ 20 - 40% Minimal Assist   23 CI 1-20% SBA / CGA   24 CH 0% Independent/ Mod I     Patient left up in chair with all lines intact, call button in reach and nurse Victoria. notified.    Assessment:  Nelly Tian is a 66 y.o. female with a medical diagnosis of Sepsis and presents with weakness, limited endurance.    Rehab identified problem list/impairments: Rehab identified problem list/impairments: weakness, impaired endurance, impaired functional mobilty, impaired cardiopulmonary response to activity    Rehab potential is fair.    Activity tolerance: Fair    Discharge recommendations: Discharge Facility/Level Of Care Needs: home with home health     Barriers to discharge:      Equipment recommendations:       GOALS:   Physical Therapy Goals        Problem: Physical Therapy Goal    Goal Priority Disciplines Outcome Goal Variances Interventions   Physical Therapy Goal    High PT/OT, PT Ongoing (interventions implemented as appropriate)     Description:  Goals to be met by: 2017     Patient will increase functional independence with mobility by performin. Bed to chair transfer with Supervision using Rolling Walker  2. Gait  x 100 feet with Contact Guard Assistance using Rolling Walker.   3. Lower extremity exercise program x20 reps per handout, with assistance as needed                PLAN:    Patient to be seen 6 x/week  to address the above listed problems via gait training, therapeutic activities, therapeutic exercises  Plan of Care expires: 17  Plan of Care reviewed with: patient         Prema Sher PTA  2017

## 2017-04-08 NOTE — SUBJECTIVE & OBJECTIVE
Medications:  Continuous Infusions:   Scheduled Meds:   albuterol-ipratropium 2.5mg-0.5mg/3mL  3 mL Nebulization Q4H    ascorbic acid (vitamin C)  500 mg Oral QHS    atorvastatin  20 mg Oral Daily    budesonide  0.5 mg Nebulization Q12H    ceFAZolin (ANCEF) IVPB  2 g Intravenous Q8H    collagenase   Topical Daily    enoxaparin  140 mg Subcutaneous Q12H    escitalopram oxalate  10 mg Oral Daily    fluconazole  200 mg Oral Q24H    furosemide  20 mg Intravenous Once    furosemide  40 mg Oral BID    gabapentin  600 mg Oral TID    lidocaine (PF) 10 mg/ml (1%)  1 mL Other Once    magnesium oxide  400 mg Oral BID    metformin  500 mg Oral BID WM    metoprolol succinate  12.5 mg Oral Daily    miconazole NITRATE 2 %   Topical BID    multivitamin  1 tablet Oral Daily    pantoprazole  40 mg Oral Daily    polyethylene glycol  17 g Oral Daily    potassium chloride SA  20 mEq Oral Daily    potassium, sodium phosphates  1 packet Oral QID (WM & HS)    vancomycin (VANCOCIN) IVPB  1,500 mg Intravenous Q12H    warfarin  5 mg Oral Daily    zinc sulfate  220 mg Oral Daily     PRN Meds:acetaminophen, clonazePAM, dextrose 50%, dextrose 50%, glucagon (human recombinant), glucose, glucose, hydrocodone-acetaminophen 10-325mg, insulin aspart, magnesium hydroxide 400 mg/5 ml, methocarbamol, ondansetron, oxycodone, ramelteon, senna-docusate 8.6-50 mg     Review of patient's allergies indicates:   Allergen Reactions    Dilaudid [hydromorphone] Anaphylaxis     Other reaction(s): Anaphylaxis  Other reaction(s): Unknown     Objective:     Vital Signs (Most Recent):  Temp: 98.2 °F (36.8 °C) (04/08/17 1203)  Pulse: 71 (04/08/17 1203)  Resp: 19 (04/08/17 1203)  BP: (!) 135/48 (04/08/17 1203)  SpO2: (!) 94 % (04/08/17 1203) Vital Signs (24h Range):  Temp:  [97.5 °F (36.4 °C)-98.7 °F (37.1 °C)] 98.2 °F (36.8 °C)  Pulse:  [64-88] 71  Resp:  [16-22] 19  SpO2:  [84 %-100 %] 94 %  BP: (101-145)/(48-56) 135/48     Weight: (!)  142 kg (313 lb)  Body mass index is 47.59 kg/(m^2).    Intake/Output - Last 3 Shifts       04/06 0700 - 04/07 0659 04/07 0700 - 04/08 0659 04/08 0700 - 04/09 0659    P.O. 450  720    IV Piggyback 900 50 300    Total Intake(mL/kg) 1350 (9.5) 50 (0.4) 1020 (7.2)    Urine (mL/kg/hr) 2250 (0.7) 1200 (0.4)     Total Output 2250 1200      Net -900 -1150 +1020           Urine Occurrence 2 x      Stool Occurrence  1 x           Physical Exam   Constitutional: She is oriented to person, place, and time.   HENT:   Head: Normocephalic and atraumatic.   Eyes: No scleral icterus.   Cardiovascular: Normal rate and regular rhythm.    Pulmonary/Chest: Effort normal and breath sounds normal. No respiratory distress. She has no wheezes. She has no rales.   Abdominal: Soft. Bowel sounds are normal. She exhibits no distension. There is no tenderness.   Obese  Wound is clean. Minimal surrounding erythema.   Neurological: She is alert and oriented to person, place, and time.   Psychiatric: She has a normal mood and affect. Her behavior is normal.       Significant Labs:  CBC:   Recent Labs  Lab 04/07/17  0813   WBC 10.70   RBC 4.09   HGB 10.1*   HCT 32.8*      MCV 80*   MCH 24.7*   MCHC 30.8*

## 2017-04-08 NOTE — PLAN OF CARE
04/07/17 1952   Patient Assessment/Suction   Level of Consciousness (AVPU) alert   Respiratory Effort Unlabored   Expansion/Accessory Muscles/Retractions expansion symmetric   All Lung Fields Breath Sounds diminished   Rhythm/Pattern, Respiratory pattern regular   Cough Frequency infrequent   Cough Type good;nonproductive   PRE-TX-O2-ETCO2   O2 Device (Oxygen Therapy) nasal cannula   Flow (L/min) 3   SpO2 96 %   Pulse 74   Resp 18   Aerosol Therapy   $ Aerosol Therapy Charges Aerosol Treatment   Respiratory Treatment Status given   SVN/Inhaler Treatment Route mask   Position During Treatment HOB at 30 degrees   Patient Tolerance good   Post-Treatment   Post-treatment Heart Rate (beats/min) 77   Post-treatment Resp Rate (breaths/min) 18   All Fields Breath Sounds unchanged

## 2017-04-08 NOTE — PROGRESS NOTES
[facility] Hospital Medicine  Progress Note    Patient Name: Nelly Tian  MRN: [id number]  Patient Class: IP- Inpatient   Admission Date: 2017  Length of Stay: 3 days  Attending Physician: Misael Wesley MD  Primary Care Provider: Constantine Bird MD        Subjective:     Principal Problem:Sepsis    HPI:  The patient is a 66 y.o. female with hx of a chronic left abdominal wound, who is presenting with acute onset fever of 103 PTA  and dyspnea which began today. Pt was sent to ER by [facility] home health nurse. Pt reports she does not feel well, and has no other associated complaints. She was recently hospitalized for her left sided abdominal wound and finished a round of Augmentin. Pt reports her granddaughter was sick with a cough recently. There are no other complaints at this time. She has a past medical history of *Atrial flutter; Anxiety; Arthritis; Asthma; Atrial fibrillation; Back pain; Cataract; CHF (congestive heart failure); COPD (chronic obstructive pulmonary disease); Depression; Diabetes mellitus; Emphysema of lung; Heart failure; Hernia; Hypercapnic respiratory failure, chronic (2016); Hyperlipidemia; Hypertension; Iron deficiency anemia (2/3/2016); Myocardial infarction; Obesity; Peripheral vascular disease; Pneumonia; Polyneuropathy; Retinal detachment; Skin ulcer (3/18/2017); Tobacco dependence; and Type II 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, not stated as uncontrolled. She has a past surgical history that includes  section; Oophorectomy; Retinal detachment surgery; Cataract extraction (Left); Eye surgery; Hysterectomy; and Tonsillectomy. Pt was evaluated in ER and found to have infected abdominal wound with cellulitis and sepsis and admitted for further evaluation and treatment.    Hospital Course:  Continue current treatment. Continue Pt/OT  for debility. Pt for I&D today.   2017  S/P abdominal wound debridement 17.  Resume coumadin and dc lovenox when INR therapeutic. Continue IV abx as per ID.      Interval History:  Continue current treatment.     Review of Systems   Constitutional: Positive for activity change and fatigue. Negative for chills and fever.   HENT: Negative for ear pain and hearing loss.    Eyes: Negative for pain and redness.   Respiratory: Negative for apnea, cough, choking, chest tightness, shortness of breath and stridor.    Cardiovascular: Negative for chest pain, palpitations and leg swelling.   Gastrointestinal: Positive for abdominal pain. Negative for blood in stool, constipation, diarrhea, nausea and vomiting.   Endocrine: Negative for polydipsia and polyphagia.   Genitourinary: Negative for difficulty urinating, dysuria, flank pain and hematuria.   Musculoskeletal: Positive for arthralgias and gait problem. Negative for neck pain and neck stiffness.   Skin: Positive for color change and wound.        Abdominal wound to left abdomen with decreased redness   Allergic/Immunologic: Negative for food allergies.   Neurological: Positive for weakness. Negative for syncope and speech difficulty.   Hematological: Bruises/bleeds easily.   Psychiatric/Behavioral: Negative for confusion, decreased concentration and suicidal ideas.     Objective:     Vital Signs (Most Recent):  Temp: 97.5 °F (36.4 °C) (04/08/17 0804)  Pulse: 76 (04/08/17 1129)  Resp: 20 (04/08/17 1129)  BP: (!) 145/54 (04/08/17 0804)  SpO2: 99 % (04/08/17 1129) Vital Signs (24h Range):  Temp:  [97.5 °F (36.4 °C)-98.7 °F (37.1 °C)] 97.5 °F (36.4 °C)  Pulse:  [62-88] 76  Resp:  [16-22] 20  SpO2:  [84 %-100 %] 99 %  BP: (101-145)/(48-56) 145/54     Weight: (!) 142 kg (313 lb)  Body mass index is 47.59 kg/(m^2).    Intake/Output Summary (Last 24 hours) at 04/08/17 1135  Last data filed at 04/08/17 0903   Gross per 24 hour   Intake              410 ml   Output             1200 ml   Net             -790 ml      Physical Exam   Constitutional: She is  oriented to person, place, and time. She appears well-developed. No distress.   Morbidly obese   HENT:   Head: Normocephalic and atraumatic.   Eyes: Conjunctivae and EOM are normal. Pupils are equal, round, and reactive to light. Right eye exhibits no discharge. Left eye exhibits no discharge.   Neck: Normal range of motion. Neck supple.   Cardiovascular: Normal rate, regular rhythm and intact distal pulses.    Pulmonary/Chest: Effort normal. No stridor. No respiratory distress.   BBS diminished   Abdominal: Soft. Bowel sounds are normal. There is tenderness. There is no rebound and no guarding.   Tenderness surrounding abdominal wound   Genitourinary:   Genitourinary Comments: Not examined   Musculoskeletal: Normal range of motion. She exhibits no edema.   Neurological: She is alert and oriented to person, place, and time. No cranial nerve deficit.   Skin: Skin is warm and dry. She is not diaphoretic. There is erythema.   Abdominal wound to left abdomen with decreased redness   Psychiatric: She has a normal mood and affect. Her behavior is normal. Judgment and thought content normal.       Significant Labs: Reviewed         Assessment/Plan:      * Sepsis  Infected abdominal wound-  Monitor closely  ID following- Currently on ancef, vancomycin, and diflucan  BC x 2 no growth so far  Urine culture no growth  Abdominal wound culture pending  Continue wound care   Pt S/p abdominal wound  Debridement 4/07/17      Diabetes mellitus with neuropathy  2000 ADA diet  Accuchecks with correctional SSI  Continue home metformin and gabapentin  Blood sugars running 100-143    Chronic obstructive pulmonary disease with acute exacerbation  Monitor O2 sats  Supplemental O2  Continue pulmicort and aerosol treatments      Major depression, recurrent, chronic  Continue home escitalopram      GERD (gastroesophageal reflux disease)  Continue home PPI      Chronic atrial fibrillation  With chronic anticoagulation with  Coumadin-  Telemetry  Resume Home coumadin today  Continue full dose lovenox till INR therapeutic  Monitor INR- Today INR is 1.3      Hypertension associated with diabetes  Hold home lisinopril for now  Monitor blood pressure       Morbid obesity with BMI of 45.0-49.9, adult  Encourage increased activity as tolerated and diet compliance      PVD (peripheral vascular disease)  Continue statin          Chronic combined systolic and diastolic congestive heart failure  Telemetry  Stable  Continue home lasix  Supplemental O2 as needed        Hyperlipidemia  Continue home statin      Chronic respiratory failure with hypoxia  As above  Continue home O2      Hypomagnesemia  Monitor  Supplement as needed      Hypophosphatemia  Monitor  Supplement as needed      Hypokalemia  Monitor  Supplement as needed      Microcytic anemia  Continue home MVI and pm vitamin C      VTE Risk Mitigation         Ordered     warfarin (COUMADIN) tablet 5 mg  Daily     Route:  Oral        04/08/17 0924          DARIEN Bobo  Department of Hospital Medicine   [facility]    Time spent seeing patient( greater than 1/2 spent in direct contact) : 38 minutes

## 2017-04-08 NOTE — ASSESSMENT & PLAN NOTE
Infected abdominal wound-  Monitor closely  ID following- Currently on ancef, vancomycin, and diflucan  BC x 2 no growth so far  Urine culture no growth  Abdominal wound culture pending  Continue wound care   Pt S/p abdominal wound  Debridement 4/07/17

## 2017-04-08 NOTE — CONSULTS
Nelly Tian [id number] is a 66 y.o. female who has been consulted for vancomycin dosing.    The patient has the following labs:     Date Creatinine (mg/dl)    BUN WBC Count   4/7/2017 Estimated Creatinine Clearance: 120.3 mL/min (based on Cr of 0.7). Lab Results   Component Value Date    BUN 8 04/07/2017     Lab Results   Component Value Date    WBC 10.70 04/07/2017        Current weight is (!) 142 kg (313 lb)    Pt is receiving vancomycin 1500 mg every 8 hours.  Vancomycin trough from 04/07 at 1800 was 25.8 mg/dL.  Target trough range is 15-20 mg/dL.   Trough was drawn about 30 minutes late and anticipate it is supra/therapeutic. Pharmacy will decrease frequency to every 12 hours. A vancomycin trough has been ordered prior to 4th dose due 04/08 at 2130.      Patient will be followed by pharmacy for changes in renal function, toxicity, and efficacy.  Thank you for allowing us to participate in this patient's care.     Michael KongD

## 2017-04-09 LAB
ANION GAP SERPL CALC-SCNC: 9 MMOL/L
BASOPHILS # BLD AUTO: 0 K/UL
BASOPHILS NFR BLD: 0.4 %
BUN SERPL-MCNC: 9 MG/DL
CALCIUM SERPL-MCNC: 9.3 MG/DL
CHLORIDE SERPL-SCNC: 97 MMOL/L
CO2 SERPL-SCNC: 35 MMOL/L
CREAT SERPL-MCNC: 0.7 MG/DL
DIFFERENTIAL METHOD: ABNORMAL
EOSINOPHIL # BLD AUTO: 0.4 K/UL
EOSINOPHIL NFR BLD: 5.3 %
ERYTHROCYTE [DISTWIDTH] IN BLOOD BY AUTOMATED COUNT: 17.4 %
EST. GFR  (AFRICAN AMERICAN): >60 ML/MIN/1.73 M^2
EST. GFR  (NON AFRICAN AMERICAN): >60 ML/MIN/1.73 M^2
GLUCOSE SERPL-MCNC: 86 MG/DL
HCT VFR BLD AUTO: 31.4 %
HGB BLD-MCNC: 9.5 G/DL
INR PPP: 1.2
LYMPHOCYTES # BLD AUTO: 2.1 K/UL
LYMPHOCYTES NFR BLD: 25.3 %
MAGNESIUM SERPL-MCNC: 2 MG/DL
MCH RBC QN AUTO: 24.6 PG
MCHC RBC AUTO-ENTMCNC: 30.3 %
MCV RBC AUTO: 81 FL
MONOCYTES # BLD AUTO: 1.1 K/UL
MONOCYTES NFR BLD: 13 %
NEUTROPHILS # BLD AUTO: 4.7 K/UL
NEUTROPHILS NFR BLD: 56 %
PHOSPHATE SERPL-MCNC: 3.6 MG/DL
PLATELET # BLD AUTO: 274 K/UL
PMV BLD AUTO: 8.4 FL
POCT GLUCOSE: 128 MG/DL (ref 70–110)
POCT GLUCOSE: 129 MG/DL (ref 70–110)
POCT GLUCOSE: 147 MG/DL (ref 70–110)
POTASSIUM SERPL-SCNC: 4.3 MMOL/L
PROTHROMBIN TIME: 12.9 SEC
RBC # BLD AUTO: 3.86 M/UL
SODIUM SERPL-SCNC: 141 MMOL/L
WBC # BLD AUTO: 8.5 K/UL

## 2017-04-09 PROCEDURE — 80048 BASIC METABOLIC PNL TOTAL CA: CPT

## 2017-04-09 PROCEDURE — 27000221 HC OXYGEN, UP TO 24 HOURS

## 2017-04-09 PROCEDURE — 84100 ASSAY OF PHOSPHORUS: CPT

## 2017-04-09 PROCEDURE — 85025 COMPLETE CBC W/AUTO DIFF WBC: CPT

## 2017-04-09 PROCEDURE — 25000003 PHARM REV CODE 250: Performed by: HOSPITALIST

## 2017-04-09 PROCEDURE — 25000242 PHARM REV CODE 250 ALT 637 W/ HCPCS: Performed by: NURSE PRACTITIONER

## 2017-04-09 PROCEDURE — 63600175 PHARM REV CODE 636 W HCPCS: Performed by: INTERNAL MEDICINE

## 2017-04-09 PROCEDURE — 25000003 PHARM REV CODE 250: Performed by: NURSE PRACTITIONER

## 2017-04-09 PROCEDURE — 25000003 PHARM REV CODE 250: Performed by: EMERGENCY MEDICINE

## 2017-04-09 PROCEDURE — 85610 PROTHROMBIN TIME: CPT

## 2017-04-09 PROCEDURE — 83735 ASSAY OF MAGNESIUM: CPT

## 2017-04-09 PROCEDURE — 94640 AIRWAY INHALATION TREATMENT: CPT

## 2017-04-09 PROCEDURE — 63600175 PHARM REV CODE 636 W HCPCS: Performed by: HOSPITALIST

## 2017-04-09 PROCEDURE — 11000001 HC ACUTE MED/SURG PRIVATE ROOM

## 2017-04-09 PROCEDURE — 94761 N-INVAS EAR/PLS OXIMETRY MLT: CPT

## 2017-04-09 PROCEDURE — 25000003 PHARM REV CODE 250: Performed by: INTERNAL MEDICINE

## 2017-04-09 RX ORDER — ADHESIVE BANDAGE
60 BANDAGE TOPICAL DAILY PRN
Status: DISCONTINUED | OUTPATIENT
Start: 2017-04-09 | End: 2017-04-11 | Stop reason: HOSPADM

## 2017-04-09 RX ORDER — ADHESIVE BANDAGE
60 BANDAGE TOPICAL DAILY PRN
Status: DISCONTINUED | OUTPATIENT
Start: 2017-04-09 | End: 2017-04-09

## 2017-04-09 RX ADMIN — POLYETHYLENE GLYCOL (3350) 17 G: 17 POWDER, FOR SOLUTION ORAL at 08:04

## 2017-04-09 RX ADMIN — Medication 400 MG: at 08:04

## 2017-04-09 RX ADMIN — GABAPENTIN 600 MG: 300 CAPSULE ORAL at 09:04

## 2017-04-09 RX ADMIN — IPRATROPIUM BROMIDE AND ALBUTEROL SULFATE 3 ML: .5; 3 SOLUTION RESPIRATORY (INHALATION) at 11:04

## 2017-04-09 RX ADMIN — BUDESONIDE 0.5 MG: 0.5 INHALANT RESPIRATORY (INHALATION) at 07:04

## 2017-04-09 RX ADMIN — VANCOMYCIN HYDROCHLORIDE 1500 MG: 1 INJECTION, POWDER, LYOPHILIZED, FOR SOLUTION INTRAVENOUS at 10:04

## 2017-04-09 RX ADMIN — METOPROLOL SUCCINATE 12.5 MG: 25 TABLET, EXTENDED RELEASE ORAL at 08:04

## 2017-04-09 RX ADMIN — METFORMIN HYDROCHLORIDE 500 MG: 500 TABLET, FILM COATED ORAL at 05:04

## 2017-04-09 RX ADMIN — ATORVASTATIN CALCIUM 20 MG: 20 TABLET, FILM COATED ORAL at 08:04

## 2017-04-09 RX ADMIN — HYDROCODONE BITARTRATE AND ACETAMINOPHEN 1 TABLET: 10; 325 TABLET ORAL at 12:04

## 2017-04-09 RX ADMIN — IPRATROPIUM BROMIDE AND ALBUTEROL SULFATE 3 ML: .5; 3 SOLUTION RESPIRATORY (INHALATION) at 12:04

## 2017-04-09 RX ADMIN — ENOXAPARIN SODIUM 140 MG: 150 INJECTION SUBCUTANEOUS at 06:04

## 2017-04-09 RX ADMIN — VANCOMYCIN HYDROCHLORIDE 1500 MG: 1 INJECTION, POWDER, LYOPHILIZED, FOR SOLUTION INTRAVENOUS at 09:04

## 2017-04-09 RX ADMIN — Medication: at 08:04

## 2017-04-09 RX ADMIN — RAMELTEON 8 MG: 8 TABLET, FILM COATED ORAL at 09:04

## 2017-04-09 RX ADMIN — HYDROCODONE BITARTRATE AND ACETAMINOPHEN 1 TABLET: 10; 325 TABLET ORAL at 09:04

## 2017-04-09 RX ADMIN — CLONAZEPAM 1 MG: 1 TABLET ORAL at 08:04

## 2017-04-09 RX ADMIN — CEFAZOLIN SODIUM 2 G: 2 SOLUTION INTRAVENOUS at 09:04

## 2017-04-09 RX ADMIN — PANTOPRAZOLE SODIUM 40 MG: 40 TABLET, DELAYED RELEASE ORAL at 08:04

## 2017-04-09 RX ADMIN — METFORMIN HYDROCHLORIDE 500 MG: 500 TABLET, FILM COATED ORAL at 08:04

## 2017-04-09 RX ADMIN — GABAPENTIN 600 MG: 300 CAPSULE ORAL at 06:04

## 2017-04-09 RX ADMIN — FLUCONAZOLE 200 MG: 100 TABLET ORAL at 05:04

## 2017-04-09 RX ADMIN — ENOXAPARIN SODIUM 140 MG: 150 INJECTION SUBCUTANEOUS at 05:04

## 2017-04-09 RX ADMIN — FUROSEMIDE 40 MG: 40 TABLET ORAL at 08:04

## 2017-04-09 RX ADMIN — IPRATROPIUM BROMIDE AND ALBUTEROL SULFATE 3 ML: .5; 3 SOLUTION RESPIRATORY (INHALATION) at 03:04

## 2017-04-09 RX ADMIN — FUROSEMIDE 40 MG: 40 TABLET ORAL at 05:04

## 2017-04-09 RX ADMIN — GABAPENTIN 600 MG: 300 CAPSULE ORAL at 01:04

## 2017-04-09 RX ADMIN — IPRATROPIUM BROMIDE AND ALBUTEROL SULFATE 3 ML: .5; 3 SOLUTION RESPIRATORY (INHALATION) at 07:04

## 2017-04-09 RX ADMIN — OXYCODONE HYDROCHLORIDE AND ACETAMINOPHEN 500 MG: 500 TABLET ORAL at 09:04

## 2017-04-09 RX ADMIN — IPRATROPIUM BROMIDE AND ALBUTEROL SULFATE 3 ML: .5; 3 SOLUTION RESPIRATORY (INHALATION) at 05:04

## 2017-04-09 RX ADMIN — COLLAGENASE SANTYL: 250 OINTMENT TOPICAL at 08:04

## 2017-04-09 RX ADMIN — THERA TABS 1 TABLET: TAB at 08:04

## 2017-04-09 RX ADMIN — POTASSIUM CHLORIDE 20 MEQ: 20 TABLET, EXTENDED RELEASE ORAL at 08:04

## 2017-04-09 RX ADMIN — Medication: at 09:04

## 2017-04-09 RX ADMIN — WARFARIN SODIUM 5 MG: 5 TABLET ORAL at 05:04

## 2017-04-09 RX ADMIN — Medication 400 MG: at 09:04

## 2017-04-09 RX ADMIN — Medication 220 MG: at 08:04

## 2017-04-09 RX ADMIN — CEFAZOLIN SODIUM 2 G: 2 SOLUTION INTRAVENOUS at 01:04

## 2017-04-09 RX ADMIN — CEFAZOLIN SODIUM 2 G: 2 SOLUTION INTRAVENOUS at 05:04

## 2017-04-09 RX ADMIN — HYDROCODONE BITARTRATE AND ACETAMINOPHEN 1 TABLET: 10; 325 TABLET ORAL at 08:04

## 2017-04-09 RX ADMIN — CLONAZEPAM 1 MG: 1 TABLET ORAL at 09:04

## 2017-04-09 RX ADMIN — MAGNESIUM HYDROXIDE 4800 MG: 400 SUSPENSION ORAL at 09:04

## 2017-04-09 NOTE — ASSESSMENT & PLAN NOTE
Infected abdominal wound-  Monitor closely  ID following- Currently on ancef, vancomycin, and diflucan  BC x 2 no growth so far  Urine culture no growth  Abdominal wound culture GNR- final culture pending  Continue wound care   Pt S/p abdominal wound  Debridement 4/07/17

## 2017-04-09 NOTE — PLAN OF CARE
Problem: Patient Care Overview  Goal: Plan of Care Review  Outcome: Ongoing (interventions implemented as appropriate)  AOx4  O2 - 3L/NC  Controlled afib on tele  Up ad ankita to BSC  C/o pain addressed with PRN meds  Reports constipation; stool softener administered  Accuchecks AC/HS  Bandage to L abdomen changed by daughter  Bed locked and in lowest position  Call light within reach  Will continue to monitor

## 2017-04-09 NOTE — ASSESSMENT & PLAN NOTE
With chronic anticoagulation with Coumadin-  Telemetry  Home coumadin resumed 4/08/17  Continue full dose lovenox till INR therapeutic  Monitor INR- Today INR is 1.2- Monitor closely - Pt on abx and also DIFLUCAN

## 2017-04-09 NOTE — ASSESSMENT & PLAN NOTE
2000 ADA diet  Accuchecks with correctional SSI  Continue home metformin and gabapentin  Blood sugars running 118-131

## 2017-04-09 NOTE — PLAN OF CARE
04/08/17 1903   Patient Assessment/Suction   Level of Consciousness (AVPU) alert   Respiratory Effort Normal;Unlabored   Expansion/Accessory Muscles/Retractions no use of accessory muscles   All Lung Fields Breath Sounds clear;diminished   PRE-TX-O2-ETCO2   O2 Device (Oxygen Therapy) nasal cannula   Flow (L/min) 3   SpO2 100 %   Pulse (!) 54   Resp 18   Aerosol Therapy   $ Aerosol Therapy Charges Aerosol Treatment   Respiratory Treatment Status given   SVN/Inhaler Treatment Route mask;with oxygen   Position During Treatment Sitting in chair   Patient Tolerance good   Post-Treatment   Post-treatment Heart Rate (beats/min) 58   Post-treatment Resp Rate (breaths/min) 18   All Fields Breath Sounds unchanged   Pt tolerates NC and  treatments well.

## 2017-04-09 NOTE — PLAN OF CARE
Problem: Patient Care Overview  Goal: Plan of Care Review  Outcome: Ongoing (interventions implemented as appropriate)  POC and fall risk reviewed with pt, understanding verbalized. Blood sugar monitored. Controlled Afib on tele. Up ad ankita to BSC. Bandage to L abdomen changed by daughter. Bed in lowest position, wheels locked, call light within reach. Will continue to monitor.

## 2017-04-09 NOTE — CONSULTS
Nelly Tian [id number] is a 66 y.o. female who has been consulted for vancomycin dosing.    The patient has the following labs:     Date Creatinine (mg/dl)    BUN WBC Count   4/8/2017 Estimated Creatinine Clearance: 120.3 mL/min (based on Cr of 0.7). Lab Results   Component Value Date    BUN 8 04/07/2017     Lab Results   Component Value Date    WBC 10.70 04/07/2017        Current weight is (!) 142 kg (313 lb)    Pt is receiving vancomycin 1500 mg every 12 hours.  Vancomycin trough from 4/8 at 2051 was 19.9 mg/dL.  Target trough range is 15-20 mg/dL.   Trough was drawn about an hour early and anticipate it is therapeutic. Pharmacy will continue current dose.   The patient will be continued on a vancomycin dose of 1500 mg every 12 hours. A vancomycin trough has been ordered prior to 4th dose due 4/10 at 1000.      Patient will be followed by pharmacy for changes in renal function, toxicity, and efficacy.  Thank you for allowing us to participate in this patient's care.     Ammy Carrillo

## 2017-04-09 NOTE — PROGRESS NOTES
[facility] Hospital Medicine  Progress Note    Patient Name: Nelly Tian  MRN: [id number]  Patient Class: IP- Inpatient   Admission Date: 2017  Length of Stay: 4 days  Attending Physician: Misael Wesley MD  Primary Care Provider: Constantine Bird MD        Subjective:     Principal Problem:Sepsis    HPI:  The patient is a 66 y.o. female with hx of a chronic left abdominal wound, who is presenting with acute onset fever of 103 PTA  and dyspnea which began today. Pt was sent to ER by [facility] home health nurse. Pt reports she does not feel well, and has no other associated complaints. She was recently hospitalized for her left sided abdominal wound and finished a round of Augmentin. Pt reports her granddaughter was sick with a cough recently. There are no other complaints at this time. She has a past medical history of *Atrial flutter; Anxiety; Arthritis; Asthma; Atrial fibrillation; Back pain; Cataract; CHF (congestive heart failure); COPD (chronic obstructive pulmonary disease); Depression; Diabetes mellitus; Emphysema of lung; Heart failure; Hernia; Hypercapnic respiratory failure, chronic (2016); Hyperlipidemia; Hypertension; Iron deficiency anemia (2/3/2016); Myocardial infarction; Obesity; Peripheral vascular disease; Pneumonia; Polyneuropathy; Retinal detachment; Skin ulcer (3/18/2017); Tobacco dependence; and Type II 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, not stated as uncontrolled. She has a past surgical history that includes  section; Oophorectomy; Retinal detachment surgery; Cataract extraction (Left); Eye surgery; Hysterectomy; and Tonsillectomy. Pt was evaluated in ER and found to have infected abdominal wound with cellulitis and sepsis and admitted for further evaluation and treatment.    Hospital Course:  Continue current treatment. Continue Pt/OT  for debility. Pt for I&D today.   2017  S/P abdominal wound debridement 17.  Resume coumadin and dc lovenox when INR therapeutic. Continue IV abx as per ID.  4/9/17  Continue current treatment.       Interval History: Continue current treatment    Review of Systems  Objective:     Vital Signs (Most Recent):  Temp: 98.7 °F (37.1 °C) (04/09/17 1133)  Pulse: 65 (04/09/17 1133)  Resp: 18 (04/09/17 1133)  BP: 137/61 (04/09/17 1133)  SpO2: 97 % (04/09/17 1133) Vital Signs (24h Range):  Temp:  [97.6 °F (36.4 °C)-98.7 °F (37.1 °C)] 98.7 °F (37.1 °C)  Pulse:  [54-94] 65  Resp:  [18-20] 18  SpO2:  [93 %-100 %] 97 %  BP: (109-142)/(47-62) 137/61     Weight: (!) 142 kg (313 lb)  Body mass index is 47.59 kg/(m^2).    Intake/Output Summary (Last 24 hours) at 04/09/17 1346  Last data filed at 04/09/17 0500   Gross per 24 hour   Intake              350 ml   Output              600 ml   Net             -250 ml      Physical Exam   Constitutional: She is oriented to person, place, and time. She appears well-developed. No distress.   Morbidly obese   HENT:   Head: Normocephalic and atraumatic.   Eyes: Conjunctivae and EOM are normal. Pupils are equal, round, and reactive to light. Right eye exhibits no discharge. Left eye exhibits no discharge.   Neck: Normal range of motion. Neck supple.   Cardiovascular: Normal rate, regular rhythm and intact distal pulses.    Pulmonary/Chest: Effort normal. No stridor. No respiratory distress.   BBS diminished   Abdominal: Soft. Bowel sounds are normal. There is tenderness. There is no rebound and no guarding.   Tenderness surrounding abdominal wound   Genitourinary:   Genitourinary Comments: Not examined   Musculoskeletal: Normal range of motion. She exhibits no edema.   Neurological: She is alert and oriented to person, place, and time. No cranial nerve deficit.   Skin: Skin is warm and dry. She is not diaphoretic. There is erythema.   Abdominal wound to left abdomen with decreased redness   Psychiatric: She has a normal mood and affect. Her behavior is normal. Judgment  and thought content normal.       Significant Labs: Reviewed    Significant Imaging: Reviewed     Assessment/Plan:      * Sepsis  Infected abdominal wound-  Monitor closely  ID following- Currently on ancef, vancomycin, and diflucan  BC x 2 no growth so far  Urine culture no growth  Abdominal wound culture GNR- final culture pending  Continue wound care   Pt S/p abdominal wound  Debridement 4/07/17      Diabetes mellitus with neuropathy  2000 ADA diet  Accuchecks with correctional SSI  Continue home metformin and gabapentin  Blood sugars running 118-131    Chronic obstructive pulmonary disease with acute exacerbation  Monitor O2 sats  Supplemental O2  Continue pulmicort and aerosol treatments      Major depression, recurrent, chronic  Continue home escitalopram      GERD (gastroesophageal reflux disease)  Continue home PPI      Chronic atrial fibrillation  With chronic anticoagulation with Coumadin-  Telemetry  Home coumadin resumed 4/08/17  Continue full dose lovenox till INR therapeutic  Monitor INR- Today INR is 1.2- Monitor closely - Pt on abx and also DIFLUCAN      Hypertension associated with diabetes  Hold home lisinopril for now  Monitor blood pressure       Morbid obesity with BMI of 45.0-49.9, adult  Encourage increased activity as tolerated and diet compliance      PVD (peripheral vascular disease)  Continue statin          Chronic combined systolic and diastolic congestive heart failure  Telemetry  Stable  Continue home lasix  Supplemental O2 as needed        Hyperlipidemia  Continue home statin      Chronic respiratory failure with hypoxia  As above  Continue home O2      Hypomagnesemia  Monitor  Supplement as needed      Hypophosphatemia  Monitor  Supplement as needed      Hypokalemia  Monitor  Supplement as needed      Microcytic anemia  Continue home MVI and pm vitamin C      VTE Risk Mitigation         Ordered     warfarin (COUMADIN) tablet 5 mg  Daily     Route:  Oral        04/08/17 0963           Avril Ren, DARIEN  Department of Hospital Medicine   [facility]  Time spent seeing patient( greater than 1/2 spent in direct contact) : 26 minutes

## 2017-04-09 NOTE — SUBJECTIVE & OBJECTIVE
Interval History: Continue current treatment    Review of Systems  Objective:     Vital Signs (Most Recent):  Temp: 98.7 °F (37.1 °C) (04/09/17 1133)  Pulse: 65 (04/09/17 1133)  Resp: 18 (04/09/17 1133)  BP: 137/61 (04/09/17 1133)  SpO2: 97 % (04/09/17 1133) Vital Signs (24h Range):  Temp:  [97.6 °F (36.4 °C)-98.7 °F (37.1 °C)] 98.7 °F (37.1 °C)  Pulse:  [54-94] 65  Resp:  [18-20] 18  SpO2:  [93 %-100 %] 97 %  BP: (109-142)/(47-62) 137/61     Weight: (!) 142 kg (313 lb)  Body mass index is 47.59 kg/(m^2).    Intake/Output Summary (Last 24 hours) at 04/09/17 1346  Last data filed at 04/09/17 0500   Gross per 24 hour   Intake              350 ml   Output              600 ml   Net             -250 ml      Physical Exam   Constitutional: She is oriented to person, place, and time. She appears well-developed. No distress.   Morbidly obese   HENT:   Head: Normocephalic and atraumatic.   Eyes: Conjunctivae and EOM are normal. Pupils are equal, round, and reactive to light. Right eye exhibits no discharge. Left eye exhibits no discharge.   Neck: Normal range of motion. Neck supple.   Cardiovascular: Normal rate, regular rhythm and intact distal pulses.    Pulmonary/Chest: Effort normal. No stridor. No respiratory distress.   BBS diminished   Abdominal: Soft. Bowel sounds are normal. There is tenderness. There is no rebound and no guarding.   Tenderness surrounding abdominal wound   Genitourinary:   Genitourinary Comments: Not examined   Musculoskeletal: Normal range of motion. She exhibits no edema.   Neurological: She is alert and oriented to person, place, and time. No cranial nerve deficit.   Skin: Skin is warm and dry. She is not diaphoretic. There is erythema.   Abdominal wound to left abdomen with decreased redness   Psychiatric: She has a normal mood and affect. Her behavior is normal. Judgment and thought content normal.       Significant Labs: Reviewed    Significant Imaging: Reviewed

## 2017-04-10 DIAGNOSIS — M51.36 LUMBAR DEGENERATIVE DISC DISEASE: ICD-10-CM

## 2017-04-10 DIAGNOSIS — M43.12 SPONDYLOLISTHESIS OF CERVICAL REGION: ICD-10-CM

## 2017-04-10 PROBLEM — K59.00 CONSTIPATION: Status: ACTIVE | Noted: 2017-04-10

## 2017-04-10 LAB
BACTERIA BLD CULT: NORMAL
BACTERIA BLD CULT: NORMAL
BACTERIA SPEC AEROBE CULT: NORMAL
BACTERIA SPEC AEROBE CULT: NORMAL
INR PPP: 1.3
POCT GLUCOSE: 105 MG/DL (ref 70–110)
POCT GLUCOSE: 121 MG/DL (ref 70–110)
POCT GLUCOSE: 123 MG/DL (ref 70–110)
PROTHROMBIN TIME: 13.9 SEC
VANCOMYCIN TROUGH SERPL-MCNC: 21 UG/ML

## 2017-04-10 PROCEDURE — 63600175 PHARM REV CODE 636 W HCPCS: Performed by: INTERNAL MEDICINE

## 2017-04-10 PROCEDURE — 36415 COLL VENOUS BLD VENIPUNCTURE: CPT

## 2017-04-10 PROCEDURE — 25000003 PHARM REV CODE 250: Performed by: SURGERY

## 2017-04-10 PROCEDURE — 25000003 PHARM REV CODE 250: Performed by: HOSPITALIST

## 2017-04-10 PROCEDURE — 80202 ASSAY OF VANCOMYCIN: CPT

## 2017-04-10 PROCEDURE — 94640 AIRWAY INHALATION TREATMENT: CPT

## 2017-04-10 PROCEDURE — 85610 PROTHROMBIN TIME: CPT

## 2017-04-10 PROCEDURE — 27000221 HC OXYGEN, UP TO 24 HOURS

## 2017-04-10 PROCEDURE — 25000003 PHARM REV CODE 250: Performed by: NURSE PRACTITIONER

## 2017-04-10 PROCEDURE — 94761 N-INVAS EAR/PLS OXIMETRY MLT: CPT

## 2017-04-10 PROCEDURE — 25000003 PHARM REV CODE 250: Performed by: EMERGENCY MEDICINE

## 2017-04-10 PROCEDURE — 25000003 PHARM REV CODE 250: Performed by: INTERNAL MEDICINE

## 2017-04-10 PROCEDURE — 25000242 PHARM REV CODE 250 ALT 637 W/ HCPCS: Performed by: NURSE PRACTITIONER

## 2017-04-10 PROCEDURE — 11000001 HC ACUTE MED/SURG PRIVATE ROOM

## 2017-04-10 PROCEDURE — 63600175 PHARM REV CODE 636 W HCPCS: Performed by: HOSPITALIST

## 2017-04-10 RX ORDER — LISINOPRIL 2.5 MG/1
2.5 TABLET ORAL DAILY
Status: DISCONTINUED | OUTPATIENT
Start: 2017-04-10 | End: 2017-04-11 | Stop reason: HOSPADM

## 2017-04-10 RX ORDER — HYDROCODONE BITARTRATE AND ACETAMINOPHEN 10; 325 MG/1; MG/1
1 TABLET ORAL EVERY 6 HOURS PRN
Qty: 120 TABLET | Refills: 0 | OUTPATIENT
Start: 2017-04-10

## 2017-04-10 RX ORDER — WARFARIN 2.5 MG/1
2.5 TABLET ORAL ONCE
Status: COMPLETED | OUTPATIENT
Start: 2017-04-10 | End: 2017-04-10

## 2017-04-10 RX ORDER — POLYETHYLENE GLYCOL 3350 17 G/17G
17 POWDER, FOR SOLUTION ORAL 2 TIMES DAILY
Status: DISCONTINUED | OUTPATIENT
Start: 2017-04-10 | End: 2017-04-11 | Stop reason: HOSPADM

## 2017-04-10 RX ADMIN — COLLAGENASE SANTYL: 250 OINTMENT TOPICAL at 08:04

## 2017-04-10 RX ADMIN — OXYCODONE HYDROCHLORIDE AND ACETAMINOPHEN 500 MG: 500 TABLET ORAL at 09:04

## 2017-04-10 RX ADMIN — BUDESONIDE 0.5 MG: 0.5 INHALANT RESPIRATORY (INHALATION) at 08:04

## 2017-04-10 RX ADMIN — RAMELTEON 8 MG: 8 TABLET, FILM COATED ORAL at 09:04

## 2017-04-10 RX ADMIN — IPRATROPIUM BROMIDE AND ALBUTEROL SULFATE 3 ML: .5; 3 SOLUTION RESPIRATORY (INHALATION) at 11:04

## 2017-04-10 RX ADMIN — POLYETHYLENE GLYCOL (3350) 17 G: 17 POWDER, FOR SOLUTION ORAL at 09:04

## 2017-04-10 RX ADMIN — FUROSEMIDE 40 MG: 40 TABLET ORAL at 08:04

## 2017-04-10 RX ADMIN — HYDROCODONE BITARTRATE AND ACETAMINOPHEN 1 TABLET: 10; 325 TABLET ORAL at 09:04

## 2017-04-10 RX ADMIN — VANCOMYCIN HYDROCHLORIDE 1500 MG: 1 INJECTION, POWDER, LYOPHILIZED, FOR SOLUTION INTRAVENOUS at 10:04

## 2017-04-10 RX ADMIN — Medication: at 09:04

## 2017-04-10 RX ADMIN — HYDROCODONE BITARTRATE AND ACETAMINOPHEN 1 TABLET: 10; 325 TABLET ORAL at 04:04

## 2017-04-10 RX ADMIN — LISINOPRIL 2.5 MG: 2.5 TABLET ORAL at 05:04

## 2017-04-10 RX ADMIN — WARFARIN SODIUM 2.5 MG: 2.5 TABLET ORAL at 10:04

## 2017-04-10 RX ADMIN — BUDESONIDE 0.5 MG: 0.5 INHALANT RESPIRATORY (INHALATION) at 07:04

## 2017-04-10 RX ADMIN — Medication: at 08:04

## 2017-04-10 RX ADMIN — GABAPENTIN 600 MG: 300 CAPSULE ORAL at 09:04

## 2017-04-10 RX ADMIN — IPRATROPIUM BROMIDE AND ALBUTEROL SULFATE 3 ML: .5; 3 SOLUTION RESPIRATORY (INHALATION) at 07:04

## 2017-04-10 RX ADMIN — Medication 400 MG: at 08:04

## 2017-04-10 RX ADMIN — IPRATROPIUM BROMIDE AND ALBUTEROL SULFATE 3 ML: .5; 3 SOLUTION RESPIRATORY (INHALATION) at 08:04

## 2017-04-10 RX ADMIN — CEFAZOLIN SODIUM 2 G: 2 SOLUTION INTRAVENOUS at 04:04

## 2017-04-10 RX ADMIN — ATORVASTATIN CALCIUM 20 MG: 20 TABLET, FILM COATED ORAL at 08:04

## 2017-04-10 RX ADMIN — Medication 220 MG: at 08:04

## 2017-04-10 RX ADMIN — METFORMIN HYDROCHLORIDE 500 MG: 500 TABLET, FILM COATED ORAL at 08:04

## 2017-04-10 RX ADMIN — CLONAZEPAM 1 MG: 1 TABLET ORAL at 06:04

## 2017-04-10 RX ADMIN — FLUCONAZOLE 200 MG: 100 TABLET ORAL at 05:04

## 2017-04-10 RX ADMIN — IPRATROPIUM BROMIDE AND ALBUTEROL SULFATE 3 ML: .5; 3 SOLUTION RESPIRATORY (INHALATION) at 04:04

## 2017-04-10 RX ADMIN — FUROSEMIDE 40 MG: 40 TABLET ORAL at 05:04

## 2017-04-10 RX ADMIN — POLYETHYLENE GLYCOL (3350) 17 G: 17 POWDER, FOR SOLUTION ORAL at 08:04

## 2017-04-10 RX ADMIN — MAGNESIUM HYDROXIDE 4800 MG: 400 SUSPENSION ORAL at 06:04

## 2017-04-10 RX ADMIN — ENOXAPARIN SODIUM 140 MG: 150 INJECTION SUBCUTANEOUS at 06:04

## 2017-04-10 RX ADMIN — GABAPENTIN 600 MG: 300 CAPSULE ORAL at 05:04

## 2017-04-10 RX ADMIN — IPRATROPIUM BROMIDE AND ALBUTEROL SULFATE 3 ML: .5; 3 SOLUTION RESPIRATORY (INHALATION) at 12:04

## 2017-04-10 RX ADMIN — PANTOPRAZOLE SODIUM 40 MG: 40 TABLET, DELAYED RELEASE ORAL at 08:04

## 2017-04-10 RX ADMIN — CEFAZOLIN SODIUM 2 G: 2 SOLUTION INTRAVENOUS at 02:04

## 2017-04-10 RX ADMIN — METFORMIN HYDROCHLORIDE 500 MG: 500 TABLET, FILM COATED ORAL at 05:04

## 2017-04-10 RX ADMIN — GABAPENTIN 600 MG: 300 CAPSULE ORAL at 02:04

## 2017-04-10 RX ADMIN — METOPROLOL SUCCINATE 12.5 MG: 25 TABLET, EXTENDED RELEASE ORAL at 08:04

## 2017-04-10 RX ADMIN — WARFARIN SODIUM 5 MG: 5 TABLET ORAL at 05:04

## 2017-04-10 RX ADMIN — OXYCODONE HYDROCHLORIDE 10 MG: 5 TABLET ORAL at 08:04

## 2017-04-10 RX ADMIN — POTASSIUM CHLORIDE 20 MEQ: 20 TABLET, EXTENDED RELEASE ORAL at 08:04

## 2017-04-10 RX ADMIN — Medication 400 MG: at 09:04

## 2017-04-10 RX ADMIN — THERA TABS 1 TABLET: TAB at 08:04

## 2017-04-10 NOTE — ASSESSMENT & PLAN NOTE
2000 ADA diet  Accuchecks with correctional SSI  Continue home metformin and gabapentin  Blood sugars running 105-147

## 2017-04-10 NOTE — PROGRESS NOTES
Final antibiotics rec's pending from ID.  Plan will be home with [facility] and resumption of wound care at [facility].

## 2017-04-10 NOTE — ASSESSMENT & PLAN NOTE
Infected abdominal wound-  Monitor closely  ID following- Currently on ancef, vancomycin, and diflucan  BC x 2 no growth    Urine culture no growth  Abdominal wound culture growing Proteus mirabilis and  Pseudomonas aeruginosa  Continue wound care   Pt S/p abdominal wound  Debridement 4/07/17

## 2017-04-10 NOTE — PROGRESS NOTES
Progress Note  Infectious Disease    Admit Date: 4/5/2017   LOS: 5 days     SUBJECTIVE:     Follow up for cellulitis of the abdomen.     Antibiotics     None          Review of Systems:  Feeling better.     OBJECTIVE:     Vital Signs (Most Recent)  Temp: 96.5 °F (35.8 °C) (04/10/17 1515)  Pulse: 64 (04/10/17 1611)  Resp: 18 (04/10/17 1611)  BP: (!) 113/53 (04/10/17 1515)  SpO2: 97 % (04/10/17 1611)    Temperature Range Min/Max (Last 24H):  Temp:  [96.5 °F (35.8 °C)-98.3 °F (36.8 °C)]     I & O (Last 24H):  Intake/Output Summary (Last 24 hours) at 04/10/17 1757  Last data filed at 04/10/17 1700   Gross per 24 hour   Intake              900 ml   Output             1200 ml   Net             -300 ml       Physical Exam:  Alert and oriented, nad,   rrr no mg//r   Lungs are cta   abd wall cellulitis is much better,   Shallow ulcerations with no purulence.     Lines/Drains:       Peripheral IV - Single Lumen 04/07/17 4081 Left Wrist (Active)   Site Assessment Clean;Dry;Intact;No swelling;No redness 4/10/2017  8:31 AM   Line Status Infusing 4/10/2017  8:31 AM   Dressing Status Clean;Dry;Intact 4/10/2017  8:31 AM       Laboratory:  CBC  No results for input(s): WBC, RBC, HGB, HCT, PLT, MCV, MCH, MCHC in the last 24 hours.  BMP  No results for input(s): GLUCOSE, NA, K, CL, CO2, BUN, CREATININE, CALCIUM in the last 24 hours.  Microbiology Results (last 7 days)     Procedure Component Value Units Date/Time    Culture, Anaerobic [[id number]] Collected:  04/07/17 1717    Order Status:  Completed Specimen:  Decubitus from Abdomen Updated:  04/10/17 1428     Anaerobic Culture Culture in progress    Aerobic culture [[id number]]  (Susceptibility) Collected:  04/07/17 1717    Order Status:  Completed Specimen:  Decubitus from Abdomen Updated:  04/10/17 1057     Aerobic Bacterial Culture --     PROTEUS MIRABILIS  Few       Aerobic Bacterial Culture --     PSEUDOMONAS AERUGINOSA  Few      Blood culture [[id number]] Collected:  04/05/17  1517    Order Status:  Completed Specimen:  Blood Updated:  04/09/17 2312     Blood Culture, Routine No Growth to date     Blood Culture, Routine No Growth to date     Blood Culture, Routine No Growth to date     Blood Culture, Routine No Growth to date     Blood Culture, Routine No Growth to date    Narrative:       Aerobic and anaerobic    Blood culture [[id number]] Collected:  04/05/17 1515    Order Status:  Completed Specimen:  Blood Updated:  04/09/17 2312     Blood Culture, Routine No Growth to date     Blood Culture, Routine No Growth to date     Blood Culture, Routine No Growth to date     Blood Culture, Routine No Growth to date     Blood Culture, Routine No Growth to date    Narrative:       Aerobic and anaerobic    Urine culture [[id number]] Collected:  04/06/17 0100    Order Status:  Completed Specimen:  Urine from Urine, Clean Catch Updated:  04/07/17 1240     Urine Culture, Routine No growth    Urine culture [[id number]]     Order Status:  Canceled Specimen:  Urine           ASSESSMENT/PLAN:     Active Hospital Problems    Diagnosis  POA    *Sepsis [A41.9]  Yes    Constipation [K59.00]  Yes    Hypophosphatemia [E83.39]  Yes    Hypokalemia [E87.6]  Yes    Microcytic anemia [D50.9]  Yes    Cellulitis, abdominal wall [L03.311]  Yes    Infected wound [T14.8, L08.9]  Yes     Abdominal      COPD (chronic obstructive pulmonary disease) [J44.9]  Yes    Hypomagnesemia [E83.42]  Yes    Nonhealing skin ulcer, limited to breakdown of skin [L98.491]  Yes    Chronic respiratory failure with hypoxia [J96.11]  Yes    Hyperlipidemia [E78.5]  Yes    Chronic combined systolic and diastolic congestive heart failure [I50.42]  Yes    Hypertension associated with diabetes [E11.59, I10]  Yes    Morbid obesity with BMI of 45.0-49.9, adult [E66.01, Z68.42]  Not Applicable    PVD (peripheral vascular disease) [I73.9]  Yes    Chronic atrial fibrillation [I48.2]  Yes    GERD (gastroesophageal reflux disease) [K21.9]   Yes    Chronic obstructive pulmonary disease with acute exacerbation [J44.1]  Yes    Major depression, recurrent, chronic [F33.9]  Yes    Long term current use of anticoagulant therapy [Z79.01]  Not Applicable    Diabetes mellitus with neuropathy [E11.40]  Yes     Chronic      Resolved Hospital Problems    Diagnosis Date Resolved POA   No resolved problems to display.     - Stop Vancomycin and Ancef   - Start Cipro 500 mg po x 5 days   - Continue Fluconazole 200 mg po daily to complete 2 weeks.   - will sign off. Please call with questions.

## 2017-04-10 NOTE — PHYSICIAN QUERY
"PT Name: Nelly Tian  MR #: [id number]    Physician Query Form - Documentation  Clarification     CDS/: Nadege Perez RN                 Contact information:  [phone]  This form is a permanent document in the medical record.     Query Date: April 10, 2017  By submitting this query, we are merely seeking further clarification of documentation. Please utilize your independent clinical judgment when addressing the question(s) below.    The Medical record contains the following:     Indicators       Supporting Clinical Findings   Location in Medical Record   x Documentation of "Debridement"Indications for i   Incision and drainage: infected wound.  Location: abdominal    incisional sharp debridement of  shallow ulcer using 15 blade scalper - circular 2 cm in diameter 3 mm deep      4/7 Surgery note         Provider, please clarify the depth of Sharp debridment:       * Depth of tissue excised:      [ x ] Skin/Subcutaneous    [ ] Soft tissue     [ ] Fascia     [ ] Muscle      [  ] Other Procedure (Specify) ________________________________    [  ] Clinically Undetermined              "

## 2017-04-10 NOTE — PT/OT/SLP PROGRESS
Occupational Therapy  Discharge    Nelly Tian  MRN: [id number]    Patient not seen today secondary to Patient unwilling to participate (pt denies need or desire for OT).      MARIA L Powell  4/10/2017

## 2017-04-10 NOTE — PLAN OF CARE
04/09/17 1959   Patient Assessment/Suction   Level of Consciousness (AVPU) alert   Respiratory Effort Normal;Unlabored   Expansion/Accessory Muscles/Retractions no use of accessory muscles   All Lung Fields Breath Sounds diminished   Rhythm/Pattern, Respiratory dyspnea on exertion   PRE-TX-O2-ETCO2   O2 Device (Oxygen Therapy) nasal cannula   Flow (L/min) 3   SpO2 100 %   Pulse 68   Resp 18   Aerosol Therapy   $ Aerosol Therapy Charges Aerosol Treatment   Respiratory Treatment Status given   SVN/Inhaler Treatment Route mask;with oxygen   Position During Treatment HOB at 30 degrees   Patient Tolerance good   Post-Treatment   Post-treatment Heart Rate (beats/min) 69   Post-treatment Resp Rate (breaths/min) 18   All Fields Breath Sounds unchanged   Pt tolerates NC and treatments well.

## 2017-04-10 NOTE — TELEPHONE ENCOUNTER
----- Message from Tika Beyer sent at 4/10/2017  8:48 AM CDT -----  Contact: pt [phone]  Patient called for a refill on her Narco 10 mg.

## 2017-04-10 NOTE — PROGRESS NOTES
[facility] Hospital Medicine  Progress Note    Patient Name: Nelly iTan  MRN: [id number]  Patient Class: IP- Inpatient   Admission Date: 2017  Length of Stay: 5 days  Attending Physician: Misael Wesley MD  Primary Care Provider: Constantine Bird MD        Subjective:     Principal Problem:Sepsis    HPI:  The patient is a 66 y.o. female with hx of a chronic left abdominal wound, who is presenting with acute onset fever of 103 PTA  and dyspnea which began today. Pt was sent to ER by [facility] home health nurse. Pt reports she does not feel well, and has no other associated complaints. She was recently hospitalized for her left sided abdominal wound and finished a round of Augmentin. Pt reports her granddaughter was sick with a cough recently. There are no other complaints at this time. She has a past medical history of *Atrial flutter; Anxiety; Arthritis; Asthma; Atrial fibrillation; Back pain; Cataract; CHF (congestive heart failure); COPD (chronic obstructive pulmonary disease); Depression; Diabetes mellitus; Emphysema of lung; Heart failure; Hernia; Hypercapnic respiratory failure, chronic (2016); Hyperlipidemia; Hypertension; Iron deficiency anemia (2/3/2016); Myocardial infarction; Obesity; Peripheral vascular disease; Pneumonia; Polyneuropathy; Retinal detachment; Skin ulcer (3/18/2017); Tobacco dependence; and Type II 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, not stated as uncontrolled. She has a past surgical history that includes  section; Oophorectomy; Retinal detachment surgery; Cataract extraction (Left); Eye surgery; Hysterectomy; and Tonsillectomy. Pt was evaluated in ER and found to have infected abdominal wound with cellulitis and sepsis and admitted for further evaluation and treatment.    Hospital Course:  Continue current treatment. Continue Pt/OT  for debility. Pt for I&D today.   2017  S/P abdominal wound debridement 17.  Resume coumadin and dc lovenox when INR therapeutic. Continue IV abx as per ID.  4/10/17  Continue current treatment. Wound culture results noted. Dc planning discussed with daughter and pt. Daughter will be available to help pt at home tomorrow for discharge. Plan possible Home with prior home health tomorrow .      Interval History:  Wound culture results noted. Dc discussed with pt and daughter will be available tomorrow to assist with discharge back to home.    Review of Systems   Constitutional: Negative for activity change, chills, fatigue and fever.   HENT: Negative for ear pain and hearing loss.    Eyes: Negative for pain and redness.   Respiratory: Negative for apnea, cough, choking, chest tightness, shortness of breath and stridor.    Cardiovascular: Negative for chest pain, palpitations and leg swelling.   Gastrointestinal: Negative for abdominal pain, blood in stool, constipation, diarrhea, nausea and vomiting.   Endocrine: Negative for polydipsia and polyphagia.   Genitourinary: Negative for difficulty urinating, dysuria, flank pain and hematuria.   Musculoskeletal: Positive for arthralgias and gait problem. Negative for neck pain and neck stiffness.   Skin: Positive for color change and wound.        Abdominal wound to left abdomen with redness resolving   Allergic/Immunologic: Negative for food allergies.   Neurological: Positive for weakness. Negative for syncope and speech difficulty.   Hematological: Bruises/bleeds easily.   Psychiatric/Behavioral: Negative for confusion, decreased concentration and suicidal ideas.     Objective:     Vital Signs (Most Recent):  Temp: 96.5 °F (35.8 °C) (04/10/17 1515)  Pulse: 67 (04/10/17 1515)  Resp: 18 (04/10/17 1515)  BP: (!) 113/53 (04/10/17 1515)  SpO2: 100 % (04/10/17 1515) Vital Signs (24h Range):  Temp:  [96.5 °F (35.8 °C)-98.3 °F (36.8 °C)] 96.5 °F (35.8 °C)  Pulse:  [58-81] 67  Resp:  [18-20] 18  SpO2:  [96 %-100 %] 100 %  BP: (105-125)/(42-56) 113/53      Weight: (!) 142 kg (313 lb)  Body mass index is 47.59 kg/(m^2).    Intake/Output Summary (Last 24 hours) at 04/10/17 1549  Last data filed at 04/10/17 1245   Gross per 24 hour   Intake              900 ml   Output              400 ml   Net              500 ml      Physical Exam   Constitutional: She is oriented to person, place, and time. She appears well-developed. No distress.   Morbidly obese   HENT:   Head: Normocephalic and atraumatic.   Eyes: Conjunctivae and EOM are normal. Pupils are equal, round, and reactive to light. Right eye exhibits no discharge. Left eye exhibits no discharge.   Neck: Normal range of motion. Neck supple.   Cardiovascular: Normal rate, regular rhythm and intact distal pulses.    Pulmonary/Chest: Effort normal. No stridor. No respiratory distress.   BBS diminished   Abdominal: Soft. Bowel sounds are normal. There is tenderness. There is no rebound and no guarding.   Tenderness surrounding abdominal wound   Genitourinary:   Genitourinary Comments: Not examined   Musculoskeletal: Normal range of motion. She exhibits no edema.   Neurological: She is alert and oriented to person, place, and time. No cranial nerve deficit.   Skin: Skin is warm and dry. She is not diaphoretic. There is erythema.   Abdominal wound to left abdomen with decreased redness   Psychiatric: She has a normal mood and affect. Her behavior is normal. Judgment and thought content normal.       Significant Labs: Reviewed         Assessment/Plan:      * Sepsis  Infected abdominal wound-  Monitor closely  ID following- Currently on ancef, vancomycin, and diflucan  BC x 2 no growth    Urine culture no growth  Abdominal wound culture growing Proteus mirabilis and  Pseudomonas aeruginosa  Continue wound care   Pt S/p abdominal wound  Debridement 4/07/17      Diabetes mellitus with neuropathy  2000 ADA diet  Accuchecks with correctional SSI  Continue home metformin and gabapentin  Blood sugars running 105-147    Chronic  obstructive pulmonary disease with acute exacerbation  Resolved      Major depression, recurrent, chronic  Continue home escitalopram      GERD (gastroesophageal reflux disease)  Continue home PPI      Chronic atrial fibrillation  With chronic anticoagulation with Coumadin-  Telemetry  Home coumadin resumed 4/08/17  Continue full dose lovenox till INR therapeutic  Monitor INR- Today INR is 1.3- Monitor closely - Pt on abx and also DIFLUCAN  Pt to receive extra dose 2.5mg coumadin today   Will have staff show pt/family how to give lovenox      Hypertension associated with diabetes  Rsume home lisinopril at lower dose  Monitor blood pressure       Morbid obesity with BMI of 45.0-49.9, adult  Encourage increased activity as tolerated and diet compliance      PVD (peripheral vascular disease)  Continue statin          Chronic combined systolic and diastolic congestive heart failure  Telemetry  Stable  Continue home lasix  Supplemental O2 as needed  Resume home lisinopril at lower dose        Hyperlipidemia  Continue home statin      Chronic respiratory failure with hypoxia  As above  Continue home O2      COPD (chronic obstructive pulmonary disease)  Monitor O2 sats  Supplemental O2  Continue pulmicort and aerosol treatments      Hypomagnesemia  Monitor  Supplement as needed      Hypophosphatemia  Monitor  Supplement as needed      Hypokalemia  Monitor  Supplement as needed      Microcytic anemia  Continue home MVI and pm vitamin C      Constipation  Increase Miralax to bid  MOM prn      VTE Risk Mitigation         Ordered     warfarin (COUMADIN) tablet 5 mg  Daily     Route:  Oral        04/08/17 0924          DARIEN Bobo  Department of Hospital Medicine   [facility]    Time spent seeing patient( greater than 1/2 spent in direct contact) : 36 minutes

## 2017-04-10 NOTE — TELEPHONE ENCOUNTER
Patient requesting a refill of Hydrocodone.  LR--3-9-17  LOV--3-29-17  FOV--5-8-17  Urine Toxicology--9-4-15  Pain Contract--9-4-15

## 2017-04-10 NOTE — SUBJECTIVE & OBJECTIVE
Interval History:  Wound culture results noted. Dc discussed with pt and daughter will be available tomorrow to assist with discharge back to home.    Review of Systems   Constitutional: Negative for activity change, chills, fatigue and fever.   HENT: Negative for ear pain and hearing loss.    Eyes: Negative for pain and redness.   Respiratory: Negative for apnea, cough, choking, chest tightness, shortness of breath and stridor.    Cardiovascular: Negative for chest pain, palpitations and leg swelling.   Gastrointestinal: Negative for abdominal pain, blood in stool, constipation, diarrhea, nausea and vomiting.   Endocrine: Negative for polydipsia and polyphagia.   Genitourinary: Negative for difficulty urinating, dysuria, flank pain and hematuria.   Musculoskeletal: Positive for arthralgias and gait problem. Negative for neck pain and neck stiffness.   Skin: Positive for color change and wound.        Abdominal wound to left abdomen with redness resolving   Allergic/Immunologic: Negative for food allergies.   Neurological: Positive for weakness. Negative for syncope and speech difficulty.   Hematological: Bruises/bleeds easily.   Psychiatric/Behavioral: Negative for confusion, decreased concentration and suicidal ideas.     Objective:     Vital Signs (Most Recent):  Temp: 96.5 °F (35.8 °C) (04/10/17 1515)  Pulse: 67 (04/10/17 1515)  Resp: 18 (04/10/17 1515)  BP: (!) 113/53 (04/10/17 1515)  SpO2: 100 % (04/10/17 1515) Vital Signs (24h Range):  Temp:  [96.5 °F (35.8 °C)-98.3 °F (36.8 °C)] 96.5 °F (35.8 °C)  Pulse:  [58-81] 67  Resp:  [18-20] 18  SpO2:  [96 %-100 %] 100 %  BP: (105-125)/(42-56) 113/53     Weight: (!) 142 kg (313 lb)  Body mass index is 47.59 kg/(m^2).    Intake/Output Summary (Last 24 hours) at 04/10/17 1549  Last data filed at 04/10/17 1245   Gross per 24 hour   Intake              900 ml   Output              400 ml   Net              500 ml      Physical Exam   Constitutional: She is oriented to  person, place, and time. She appears well-developed. No distress.   Morbidly obese   HENT:   Head: Normocephalic and atraumatic.   Eyes: Conjunctivae and EOM are normal. Pupils are equal, round, and reactive to light. Right eye exhibits no discharge. Left eye exhibits no discharge.   Neck: Normal range of motion. Neck supple.   Cardiovascular: Normal rate, regular rhythm and intact distal pulses.    Pulmonary/Chest: Effort normal. No stridor. No respiratory distress.   BBS diminished   Abdominal: Soft. Bowel sounds are normal. There is tenderness. There is no rebound and no guarding.   Tenderness surrounding abdominal wound   Genitourinary:   Genitourinary Comments: Not examined   Musculoskeletal: Normal range of motion. She exhibits no edema.   Neurological: She is alert and oriented to person, place, and time. No cranial nerve deficit.   Skin: Skin is warm and dry. She is not diaphoretic. There is erythema.   Abdominal wound to left abdomen with decreased redness   Psychiatric: She has a normal mood and affect. Her behavior is normal. Judgment and thought content normal.       Significant Labs: Reviewed

## 2017-04-10 NOTE — NURSING
Pt lab value. TORY Corcoran notified. Will continue to monitor   Ref. Range 4/10/2017 04:37   Coumadin Monitoring INR Latest Ref Range: 0.8 - 1.2  1.3 (H)

## 2017-04-10 NOTE — PROGRESS NOTES
Progress Note  Infectious Disease    Admit Date: 4/5/2017   LOS: 5 days     SUBJECTIVE:     Follow-up For:  Abdominal wall cellulitis and decubitus wounds     Antibiotics     Start     Stop Route Frequency Ordered    04/06/17 1815  cefazolin (ANCEF) 2 gram in dextrose 5% 50 mL IVPB (premix)      -- IV Every 8 hours (non-standard times) 04/06/17 1703    04/07/17 2200  vancomycin (VANCOCIN) 1,500 mg in dextrose 5 % 250 mL IVPB      -- IV Every 12 hours (non-standard times) 04/07/17 1922          Review of Systems:  Feeling better. States that abdominal wall is much less red     OBJECTIVE:     Vital Signs (Most Recent)  Temp: 97.9 °F (36.6 °C) (04/10/17 0041)  Pulse: 75 (04/10/17 0041)  Resp: 18 (04/10/17 0041)  BP: (!) 125/54 (04/10/17 0041)  SpO2: 98 % (04/10/17 0041)    Temperature Range Min/Max (Last 24H):  Temp:  [97.6 °F (36.4 °C)-98.7 °F (37.1 °C)]     I & O (Last 24H):  Intake/Output Summary (Last 24 hours) at 04/10/17 0443  Last data filed at 04/10/17 0200   Gross per 24 hour   Intake              350 ml   Output                0 ml   Net              350 ml       Physical Exam:  Alert and oriented, nad,   abd wall with less erythema over the majority , right side of the panus with several shallow ulcerations. No purulence, some blanching erythema surrounding.     Lines/Drains:       Peripheral IV - Single Lumen 04/07/17 8315 Left Wrist (Active)   Site Assessment Clean;Dry;Intact 4/9/2017  8:00 PM   Line Status Infusing 4/9/2017 10:00 PM   Dressing Status Clean;Dry;Intact 4/9/2017  8:00 PM       Laboratory:  CBC    Recent Labs  Lab 04/09/17 0452   WBC 8.50   RBC 3.86*   HGB 9.5*   HCT 31.4*      MCV 81*   MCH 24.6*   MCHC 30.3*     BMP    Recent Labs  Lab 04/09/17 0452      K 4.3   CL 97   CO2 35*   BUN 9   CREATININE 0.7   CALCIUM 9.3     GNR from wound debridement       ASSESSMENT/PLAN:     Active Hospital Problems    Diagnosis  POA    *Sepsis [A41.9]  Yes    Hypophosphatemia [E83.39]  Yes     Hypokalemia [E87.6]  Yes    Microcytic anemia [D50.9]  Yes    Cellulitis, abdominal wall [L03.311]  Yes    Infected wound [T14.8, L08.9]  Yes     Abdominal      COPD (chronic obstructive pulmonary disease) [J44.9]  Yes    Hypomagnesemia [E83.42]  Yes    Nonhealing skin ulcer, limited to breakdown of skin [L98.491]  Yes    Chronic respiratory failure with hypoxia [J96.11]  Yes    Hyperlipidemia [E78.5]  Yes    Chronic combined systolic and diastolic congestive heart failure [I50.42]  Yes    Hypertension associated with diabetes [E11.59, I10]  Yes    Morbid obesity with BMI of 45.0-49.9, adult [E66.01, Z68.42]  Not Applicable    PVD (peripheral vascular disease) [I73.9]  Yes    Chronic atrial fibrillation [I48.2]  Yes    GERD (gastroesophageal reflux disease) [K21.9]  Yes    Chronic obstructive pulmonary disease with acute exacerbation [J44.1]  Yes    Major depression, recurrent, chronic [F33.9]  Yes    Long term current use of anticoagulant therapy [Z79.01]  Not Applicable    Diabetes mellitus with neuropathy [E11.40]  Yes     Chronic      Resolved Hospital Problems    Diagnosis Date Resolved POA   No resolved problems to display.     Cellulitis of abdominal wound resolving. Remaining erythema most likely edema. GNR from wound debridement now likely to be true pathogen. Wounds do not look infected.   - F/u GNR and will likely change to oral abx tomorrow.   - please call with questions.

## 2017-04-10 NOTE — ASSESSMENT & PLAN NOTE
Telemetry  Stable  Continue home lasix  Supplemental O2 as needed  Resume home lisinopril at lower dose

## 2017-04-10 NOTE — PLAN OF CARE
"Problem: Patient Care Overview  Goal: Plan of Care Review  Outcome: Ongoing (interventions implemented as appropriate)  POC reviewed with pt,verbalized understanding  AOx4  O2 @ 3L NC  NAD noted  Vs obtained  BG monitored  Denies sob or pain  Free of fall or injury  Pt up to BSC  Pt stated "that daughter will be here during the day to help her shower and change dressing"  Abx infusing as ordered  resp treatment received per ordered  Bed in lowest position, wheels locked  Call light in reach  Side rails up X2  Will continue to monitor      "

## 2017-04-10 NOTE — PT/OT/SLP PROGRESS
Physical Therapy      Nelly Tian  MRN: [id number]    Patient not seen today secondary to Patient unwilling to participate (pt reported she had just come back to her room from walking on her own ). Will follow-up tomorrow.    Del Del Toro, SPT

## 2017-04-10 NOTE — PLAN OF CARE
Problem: Patient Care Overview  Goal: Plan of Care Review  Outcome: Ongoing (interventions implemented as appropriate)  Pt rec neb treatments with duoneb/pulmicort with O2 @ 3lpm nc. BS diminished prosper with HR 71 bpm and 98% sats.

## 2017-04-10 NOTE — ASSESSMENT & PLAN NOTE
With chronic anticoagulation with Coumadin-  Telemetry  Home coumadin resumed 4/08/17  Continue full dose lovenox till INR therapeutic  Monitor INR- Today INR is 1.3- Monitor closely - Pt on abx and also DIFLUCAN  Pt to receive extra dose 2.5mg coumadin today   Will have staff show pt/family how to give lovenox

## 2017-04-10 NOTE — CONSULTS
Nelly Tian [id number] is a 66 y.o. female who has been consulted for vancomycin dosing.    The patient has the following labs:     Date Creatinine (mg/dl)    WBC Count   4/10/2017 Estimated Creatinine Clearance: 120.3 mL/min (based on Cr of 0.7). Lab Results   Component Value Date    WBC 8.50 04/09/2017        Current weight is (!) 142 kg (313 lb)    Pt is receiving vancomycin 1500 mg every 12 hours.  Vancomycin trough from 4/10/17 at 1000 was 21 mg/dL.  Target goal is 15-20 mg/dL.    Trough was drawn on time and anticipate it is  Supratherapeutic. Pharmacy will decrease current dose.   The patient will be changed to a vancomycin dose of 1250 mg every 12 hours. The vancomycin trough has been ordered for 4/12/17 at 1030.    Patient will be followed by pharmacy for changes in renal function, toxicity, and efficacy.    Thank you for allowing us to participate in this patient's care.     Torsten Murrell, MichaelD

## 2017-04-11 ENCOUNTER — OUTPATIENT CASE MANAGEMENT (OUTPATIENT)
Dept: ADMINISTRATIVE | Facility: OTHER | Age: 67
End: 2017-04-11

## 2017-04-11 ENCOUNTER — TELEPHONE (OUTPATIENT)
Dept: FAMILY MEDICINE | Facility: CLINIC | Age: 67
End: 2017-04-11

## 2017-04-11 VITALS
TEMPERATURE: 98 F | BODY MASS INDEX: 44.41 KG/M2 | OXYGEN SATURATION: 98 % | WEIGHT: 293 LBS | RESPIRATION RATE: 18 BRPM | SYSTOLIC BLOOD PRESSURE: 134 MMHG | HEIGHT: 68 IN | HEART RATE: 92 BPM | DIASTOLIC BLOOD PRESSURE: 62 MMHG

## 2017-04-11 DIAGNOSIS — L98.491 NONHEALING SKIN ULCER, LIMITED TO BREAKDOWN OF SKIN: ICD-10-CM

## 2017-04-11 DIAGNOSIS — M51.36 LUMBAR DEGENERATIVE DISC DISEASE: ICD-10-CM

## 2017-04-11 DIAGNOSIS — M43.12 SPONDYLOLISTHESIS OF CERVICAL REGION: ICD-10-CM

## 2017-04-11 PROBLEM — A41.9 SEPSIS, UNSPECIFIED: Status: RESOLVED | Noted: 2017-04-05 | Resolved: 2017-04-11

## 2017-04-11 PROBLEM — E83.39 HYPOPHOSPHATEMIA: Status: RESOLVED | Noted: 2017-04-06 | Resolved: 2017-04-11

## 2017-04-11 LAB
INR PPP: 1.5
POCT GLUCOSE: 108 MG/DL (ref 70–110)
POCT GLUCOSE: 144 MG/DL (ref 70–110)
POCT GLUCOSE: 96 MG/DL (ref 70–110)
PROTHROMBIN TIME: 15.5 SEC

## 2017-04-11 PROCEDURE — 63600175 PHARM REV CODE 636 W HCPCS: Performed by: HOSPITALIST

## 2017-04-11 PROCEDURE — 97116 GAIT TRAINING THERAPY: CPT

## 2017-04-11 PROCEDURE — 25000003 PHARM REV CODE 250: Performed by: HOSPITALIST

## 2017-04-11 PROCEDURE — 25000003 PHARM REV CODE 250: Performed by: NURSE PRACTITIONER

## 2017-04-11 PROCEDURE — 36415 COLL VENOUS BLD VENIPUNCTURE: CPT

## 2017-04-11 PROCEDURE — 25000003 PHARM REV CODE 250: Performed by: EMERGENCY MEDICINE

## 2017-04-11 PROCEDURE — 27000221 HC OXYGEN, UP TO 24 HOURS

## 2017-04-11 PROCEDURE — 94640 AIRWAY INHALATION TREATMENT: CPT

## 2017-04-11 PROCEDURE — 25000242 PHARM REV CODE 250 ALT 637 W/ HCPCS: Performed by: NURSE PRACTITIONER

## 2017-04-11 PROCEDURE — 85610 PROTHROMBIN TIME: CPT

## 2017-04-11 PROCEDURE — 94761 N-INVAS EAR/PLS OXIMETRY MLT: CPT

## 2017-04-11 RX ORDER — CIPROFLOXACIN 500 MG/1
500 TABLET ORAL EVERY 12 HOURS
Qty: 10 TABLET | Refills: 0 | Status: SHIPPED | OUTPATIENT
Start: 2017-04-11 | End: 2017-04-16

## 2017-04-11 RX ORDER — CIPROFLOXACIN 500 MG/1
500 TABLET ORAL EVERY 12 HOURS
Status: DISCONTINUED | OUTPATIENT
Start: 2017-04-11 | End: 2017-04-11 | Stop reason: HOSPADM

## 2017-04-11 RX ORDER — WARFARIN SODIUM 5 MG/1
2.5 TABLET ORAL DAILY
Qty: 90 TABLET | Refills: 1
Start: 2017-04-11 | End: 2017-10-03 | Stop reason: ALTCHOICE

## 2017-04-11 RX ORDER — ASCORBIC ACID 500 MG
500 TABLET ORAL NIGHTLY
COMMUNITY
Start: 2017-04-11 | End: 2019-08-16

## 2017-04-11 RX ORDER — ENOXAPARIN SODIUM 150 MG/ML
140 INJECTION SUBCUTANEOUS EVERY 12 HOURS
Qty: 6 ML | Refills: 1 | Status: SHIPPED | OUTPATIENT
Start: 2017-04-11 | End: 2017-04-14

## 2017-04-11 RX ORDER — ZINC SULFATE 50(220)MG
220 CAPSULE ORAL DAILY
COMMUNITY
Start: 2017-04-11 | End: 2017-04-28 | Stop reason: HOSPADM

## 2017-04-11 RX ORDER — LISINOPRIL 20 MG/1
10 TABLET ORAL DAILY
Start: 2017-04-11 | End: 2017-06-19 | Stop reason: SDUPTHER

## 2017-04-11 RX ORDER — LANOLIN ALCOHOL/MO/W.PET/CERES
400 CREAM (GRAM) TOPICAL 2 TIMES DAILY
Refills: 0 | COMMUNITY
Start: 2017-04-11 | End: 2018-06-02

## 2017-04-11 RX ORDER — FLUCONAZOLE 200 MG/1
200 TABLET ORAL DAILY
Qty: 9 TABLET | Refills: 0 | Status: SHIPPED | OUTPATIENT
Start: 2017-04-11 | End: 2017-04-20

## 2017-04-11 RX ORDER — OXYCODONE HYDROCHLORIDE 10 MG/1
10 TABLET ORAL EVERY 12 HOURS PRN
Qty: 60 TABLET | Refills: 0 | Status: CANCELLED | OUTPATIENT
Start: 2017-04-11

## 2017-04-11 RX ORDER — WARFARIN 2.5 MG/1
2.5 TABLET ORAL DAILY
Status: DISCONTINUED | OUTPATIENT
Start: 2017-04-11 | End: 2017-04-11 | Stop reason: HOSPADM

## 2017-04-11 RX ORDER — METOPROLOL SUCCINATE 25 MG/1
12.5 TABLET, EXTENDED RELEASE ORAL DAILY
Start: 2017-04-11 | End: 2017-09-07 | Stop reason: SDUPTHER

## 2017-04-11 RX ADMIN — Medication: at 08:04

## 2017-04-11 RX ADMIN — FUROSEMIDE 40 MG: 40 TABLET ORAL at 08:04

## 2017-04-11 RX ADMIN — LISINOPRIL 2.5 MG: 2.5 TABLET ORAL at 08:04

## 2017-04-11 RX ADMIN — IPRATROPIUM BROMIDE AND ALBUTEROL SULFATE 3 ML: .5; 3 SOLUTION RESPIRATORY (INHALATION) at 07:04

## 2017-04-11 RX ADMIN — METOPROLOL SUCCINATE 12.5 MG: 25 TABLET, EXTENDED RELEASE ORAL at 08:04

## 2017-04-11 RX ADMIN — CLONAZEPAM 1 MG: 1 TABLET ORAL at 09:04

## 2017-04-11 RX ADMIN — THERA TABS 1 TABLET: TAB at 08:04

## 2017-04-11 RX ADMIN — CIPROFLOXACIN 500 MG: 500 TABLET, FILM COATED ORAL at 12:04

## 2017-04-11 RX ADMIN — BUDESONIDE 0.5 MG: 0.5 INHALANT RESPIRATORY (INHALATION) at 07:04

## 2017-04-11 RX ADMIN — OXYCODONE HYDROCHLORIDE 10 MG: 5 TABLET ORAL at 08:04

## 2017-04-11 RX ADMIN — POTASSIUM CHLORIDE 20 MEQ: 20 TABLET, EXTENDED RELEASE ORAL at 08:04

## 2017-04-11 RX ADMIN — COLLAGENASE SANTYL: 250 OINTMENT TOPICAL at 08:04

## 2017-04-11 RX ADMIN — ENOXAPARIN SODIUM 140 MG: 150 INJECTION SUBCUTANEOUS at 06:04

## 2017-04-11 RX ADMIN — IPRATROPIUM BROMIDE AND ALBUTEROL SULFATE 3 ML: .5; 3 SOLUTION RESPIRATORY (INHALATION) at 04:04

## 2017-04-11 RX ADMIN — HYDROCODONE BITARTRATE AND ACETAMINOPHEN 1 TABLET: 10; 325 TABLET ORAL at 12:04

## 2017-04-11 RX ADMIN — ATORVASTATIN CALCIUM 20 MG: 20 TABLET, FILM COATED ORAL at 08:04

## 2017-04-11 RX ADMIN — GABAPENTIN 600 MG: 300 CAPSULE ORAL at 05:04

## 2017-04-11 RX ADMIN — METFORMIN HYDROCHLORIDE 500 MG: 500 TABLET, FILM COATED ORAL at 08:04

## 2017-04-11 RX ADMIN — IPRATROPIUM BROMIDE AND ALBUTEROL SULFATE 3 ML: .5; 3 SOLUTION RESPIRATORY (INHALATION) at 11:04

## 2017-04-11 RX ADMIN — Medication 400 MG: at 08:04

## 2017-04-11 RX ADMIN — IPRATROPIUM BROMIDE AND ALBUTEROL SULFATE 3 ML: .5; 3 SOLUTION RESPIRATORY (INHALATION) at 12:04

## 2017-04-11 RX ADMIN — POLYETHYLENE GLYCOL (3350) 17 G: 17 POWDER, FOR SOLUTION ORAL at 08:04

## 2017-04-11 RX ADMIN — Medication 220 MG: at 08:04

## 2017-04-11 RX ADMIN — PANTOPRAZOLE SODIUM 40 MG: 40 TABLET, DELAYED RELEASE ORAL at 08:04

## 2017-04-11 NOTE — PLAN OF CARE
Problem: Patient Care Overview  Goal: Plan of Care Review  Outcome: Ongoing (interventions implemented as appropriate)  POC reviewed with pt, verbalized understanding  NAD noted  Pt up to BSC  VS obtained  BG monitored  Free of fall or injury  Respiratory Tx received per ordered  PRN meds given for pain  Bed in lowest position, wheels locked  Call light in reach  Will continue to monitor

## 2017-04-11 NOTE — DISCHARGE SUMMARY
[facility] Hospital Medicine  Discharge Summary      Patient Name: Nelly Tian  MRN: [id number]  Admission Date: 2017  Hospital Length of Stay: 6 days  Discharge Date and Time:  2017 11:59 AM  Attending Physician: Misael Wesley MD   Discharging Provider: DARIEN Bobo  Primary Care Provider: Constantine Bird MD      HPI:   The patient is a 66 y.o. female with hx of a chronic left abdominal wound, who is presenting with acute onset fever of 103 PTA  and dyspnea which began today. Pt was sent to ER by [facility] home health nurse. Pt reports she does not feel well, and has no other associated complaints. She was recently hospitalized for her left sided abdominal wound and finished a round of Augmentin. Pt reports her granddaughter was sick with a cough recently. There are no other complaints at this time. She has a past medical history of *Atrial flutter; Anxiety; Arthritis; Asthma; Atrial fibrillation; Back pain; Cataract; CHF (congestive heart failure); COPD (chronic obstructive pulmonary disease); Depression; Diabetes mellitus; Emphysema of lung; Heart failure; Hernia; Hypercapnic respiratory failure, chronic (2016); Hyperlipidemia; Hypertension; Iron deficiency anemia (2/3/2016); Myocardial infarction; Obesity; Peripheral vascular disease; Pneumonia; Polyneuropathy; Retinal detachment; Skin ulcer (3/18/2017); Tobacco dependence; and Type II 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, not stated as uncontrolled. She has a past surgical history that includes  section; Oophorectomy; Retinal detachment surgery; Cataract extraction (Left); Eye surgery; Hysterectomy; and Tonsillectomy. Pt was evaluated in ER and found to have infected abdominal wound with cellulitis and sepsis and admitted for further evaluation and treatment.    * No surgery found *      Indwelling Lines/Drains at time of discharge:   Lines/Drains/Airways     Pressure Ulcer                  Pressure Ulcer 03/20/17 0829 Stage II 22 days              Hospital Course:   Pt was monitored closely during her stay. She received an ID consult with Dr. Barrientos and Surgical consultation with Dr. Lucero. Pt was placed on IV ancef, vancomycin, and diflucan for her sepsis and infected abdominal wound. Her coumadin was held and on 4/07/17, pt had I&D one to her abdominal wound. A culture was sent to lab. Pt tolerated procedure well. Pt had blood cultures x 2 that showed no growth. A  urine culture showed no no growth. However, her Abdominal wound culture grew out  Proteus mirabilis and Pseudomonas aeruginosa, which were both sensitive to cipro. Pt was resumed back on her coumadin and was bridged with full dose lovenox. Pt's overall condition remained stable and she was discharged to home when cleared by ID. Case management was consulted to assist with discharge planning needs. Pt was to continue with [facility]  who had been following her before. Pt was to have daily INR to be monitored closely due to home Abx administration. Home health was to correlate INR with coumadin clinic for further titration. Pt was discharged to home on lovenox till INR therapeutic.          Consults:   Consults         Status Ordering Provider     Inpatient consult to General Surgery  Once     Provider:  Sadi Wilson MD    Completed NEENA LICONA     Inpatient consult to Infectious Diseases  Once     Provider:  Opal Jacobsen MD    Completed NEENA LICONA     Inpatient consult to Social Work/Case Management  Once     Provider:  (Not yet assigned)    Completed NEENA LICONA     Nutrition Services Referral  Once     Provider:  (Not yet assigned)    Completed NEENA LICONA          Significant Diagnostic Studies: Labs:   CMP No results for input(s): NA, K, CL, CO2, GLU, BUN, CREATININE, CALCIUM, PROT, ALBUMIN, BILITOT, ALKPHOS, AST, ALT, ANIONGAP, ESTGFRAFRICA, EGFRNONAA in the last 48 hours., CBC No  results for input(s): WBC, HGB, HCT, PLT in the last 48 hours. and INR   Lab Results   Component Value Date    INR 1.5 (H) 04/11/2017    INR 1.3 (H) 04/10/2017    INR 1.2 04/09/2017       Pending Diagnostic Studies:     None        Final Active Diagnoses:    Diagnosis Date Noted POA    Constipation [K59.00] 04/10/2017 Yes    Hypokalemia [E87.6] 04/06/2017 Yes    Microcytic anemia [D50.9] 04/06/2017 Yes    Cellulitis, abdominal wall [L03.311]  Yes    Infected wound [T14.8, L08.9] 04/05/2017 Yes    COPD (chronic obstructive pulmonary disease) [J44.9] 04/05/2017 Yes    Hypomagnesemia [E83.42] 04/05/2017 Yes    Nonhealing skin ulcer, limited to breakdown of skin [L98.491] 03/18/2017 Yes    Chronic respiratory failure with hypoxia [J96.11] 11/16/2016 Yes    Hyperlipidemia [E78.5] 11/02/2016 Yes    Chronic combined systolic and diastolic congestive heart failure [I50.42] 01/19/2016 Yes    Hypertension associated with diabetes [E11.59, I10] 01/19/2016 Yes    Morbid obesity with BMI of 45.0-49.9, adult [E66.01, Z68.42] 01/19/2016 Not Applicable    PVD (peripheral vascular disease) [I73.9] 01/19/2016 Yes    Chronic atrial fibrillation [I48.2] 11/10/2015 Yes    GERD (gastroesophageal reflux disease) [K21.9] 11/13/2014 Yes    Chronic obstructive pulmonary disease with acute exacerbation [J44.1]  Yes    Major depression, recurrent, chronic [F33.9]  Yes    Long term current use of anticoagulant therapy [Z79.01] 10/03/2012 Not Applicable    Diabetes mellitus with neuropathy [E11.40] 08/13/2012 Yes     Chronic      Problems Resolved During this Admission:    Diagnosis Date Noted Date Resolved POA    PRINCIPAL PROBLEM:  Sepsis [A41.9] 04/05/2017 04/11/2017 Yes    Hypophosphatemia [E83.39] 04/06/2017 04/11/2017 Yes      No new Assessment & Plan notes have been filed under this hospital service since the last note was generated.  Service: Hospital Medicine      Discharged Condition: stable    Disposition:  Home-Health Care [facility]    Follow Up:  Follow-up Information     Follow up with Constantine Bird MD In 2 weeks.    Specialty:  Family Medicine    Why:  Follow up for infected abdominal wound    Contact information:    [address] [id number] [phone]          Patient Instructions:     Referral to Home health   Referral Priority: Routine Referral Type: Home Health Care   Referral Reason: Specialty Services Required    Requested Specialty: Home Health Services    Number of Visits Requested: 1      Diet general   Order Comments: 1800 ADA     Activity as tolerated     Call MD for:  temperature >100.4     Call MD for:  redness, tenderness, or signs of infection (pain, swelling, redness, odor or green/yellow discharge around incision site)     Call MD for:   Order Comments: Any decline in condition     Change dressing (specify)   Order Comments: Clean abdominal wound daily with wound care cleanser and Apply santyl ointment to sough at wound base and redress with ABD pad.       Medications:  Reconciled Home Medications:   Current Discharge Medication List      START taking these medications    Details   ciprofloxacin HCl (CIPRO) 500 MG tablet Take 1 tablet (500 mg total) by mouth every 12 (twelve) hours.  Qty: 10 tablet, Refills: 0      enoxaparin (LOVENOX) 150 mg/mL Syrg Inject 0.93 mLs (140 mg total) into the skin every 12 (twelve) hours. Substitution  permitted  Qty: 6 mL, Refills: 1      fluconazole (DIFLUCAN) 200 MG Tab Take 1 tablet (200 mg total) by mouth once daily.  Qty: 9 tablet, Refills: 0      magnesium oxide (MAG-OX) 400 mg tablet Take 1 tablet (400 mg total) by mouth 2 (two) times daily.  Refills: 0      multivit, Ca, min-FA-soy isofl 400-60 mcg-mg Tab Take 1 tablet by mouth once daily.      zinc sulfate (ZINCATE) 220 (50) mg capsule Take 1 capsule (220 mg total) by mouth once daily.         CONTINUE these medications which have CHANGED    Details   ascorbic acid, vitamin C, (VITAMIN C) 500 MG tablet  Take 1 tablet (500 mg total) by mouth every evening.      lisinopril (PRINIVIL,ZESTRIL) 20 MG tablet Take 0.5 tablets (10 mg total) by mouth once daily.      metoprolol succinate (TOPROL-XL) 25 MG 24 hr tablet Take 0.5 tablets (12.5 mg total) by mouth once daily. On hold waiting for dr visit      warfarin (COUMADIN) 5 MG tablet Take 0.5 tablets (2.5 mg total) by mouth Daily.  Qty: 90 tablet, Refills: 1         CONTINUE these medications which have NOT CHANGED    Details   acetaminophen (TYLENOL) 325 MG tablet Take 2 tablets (650 mg total) by mouth every 6 (six) hours as needed.  Refills: 0      albuterol (ACCUNEB) 0.63 mg/3 mL Nebu INHALE THE CONTENTS OF 1 VIAL  BY  NEBULIZER EVERY 6 HOURS AS NEEDED  Qty: 75 mL, Refills: 11      albuterol (VENTOLIN HFA) 90 mcg/actuation inhaler INHALE TWO PUFFS BY MOUTH EVERY 6 HOURS AS NEEDED FOR WHEEZING  Qty: 18 each, Refills: 3      albuterol-ipratropium 2.5mg-0.5mg/3mL (DUO-NEB) 0.5 mg-3 mg(2.5 mg base)/3 mL nebulizer solution INHALE THE CONTENTS OF 1 VIAL VIA NEBULIZER EVERY 6 HOURS AS NEEDED FOR  WHEEZING (DO NOT USE WITH ALBUTEROL INHALER)  Qty: 90 mL, Refills: 0      atorvastatin (LIPITOR) 20 MG tablet Take 1 tablet (20 mg total) by mouth once daily.  Qty: 90 tablet, Refills: 3    Associated Diagnoses: Hypertension associated with diabetes      budesonide (PULMICORT) 0.5 mg/2 mL nebulizer solution INHALE THE CONTENTS OF 1 VIAL VIA NEBULIZER EVERY DAY  Qty: 180 mL, Refills: 4      clonazePAM (KLONOPIN) 1 MG tablet Take 1 tablet (1 mg total) by mouth 2 (two) times daily as needed for Anxiety.  Qty: 60 tablet, Refills: 4      escitalopram oxalate (LEXAPRO) 10 MG tablet TAKE 1 TABLET ONE TIME DAILY  Qty: 90 tablet, Refills: 3    Associated Diagnoses: Depression      furosemide (LASIX) 40 MG tablet Take 1 tablet (40 mg total) by mouth 2 (two) times daily.  Qty: 60 tablet, Refills: 0      gabapentin (NEURONTIN) 600 MG tablet Take 1 tablet (600 mg total) by mouth 3 (three) times  daily.  Qty: 270 tablet, Refills: 3    Associated Diagnoses: Diabetic neurogenic arthropathy      hydrocodone-acetaminophen 10-325mg (NORCO)  mg Tab Take 1 tablet by mouth every 6 (six) hours as needed.  Qty: 120 tablet, Refills: 0    Associated Diagnoses: Lumbar degenerative disc disease; Spondylolisthesis of cervical region      levalbuterol (XOPENEX) 0.63 mg/3 mL nebulizer solution INHALE THE CONTENTS OF 1 VIAL BY NEBULIZATION 3 (THREE) TIMES DAILY.  Qty: 792 mL, Refills: 3      magnesium hydroxide 400 mg/5 ml (MILK OF MAGNESIA) 400 mg/5 mL Susp Take 30 mLs by mouth daily as needed.      metformin (GLUCOPHAGE) 500 MG tablet TAKE 1 TABLET TWICE DAILY WITH MEALS  Qty: 180 tablet, Refills: 3      methocarbamol (ROBAXIN) 750 MG Tab TAKE 1 TABLET FOUR TIMES DAILY  Qty: 360 tablet, Refills: 1      miconazole NITRATE 2 % (MICOTIN) 2 % top powder Apply topically 2 (two) times daily. Skin folds  Refills: 0      multivitamin (THERAGRAN) tablet Take 1 tablet by mouth once daily.      oxycodone (ROXICODONE) 10 mg Tab immediate release tablet Take 1 tablet (10 mg total) by mouth every 12 (twelve) hours as needed for Pain.  Qty: 60 tablet, Refills: 0    Associated Diagnoses: Nonhealing skin ulcer, limited to breakdown of skin      polyethylene glycol (GLYCOLAX) 17 gram/dose powder MIX 17 GRAMS IN LIQUID AND DRINK EVERY DAY AS NEEDED  Qty: 1530 g, Refills: 11      potassium chloride SA (K-DUR,KLOR-CON) 20 MEQ tablet Take 1 tablet (20 mEq total) by mouth once daily.  Qty: 30 tablet, Refills: 1      SANTYL ointment APPLY  OINTMENT TOPICALLY TO AFFECTED AREA ONCE DAILY  Qty: 90 g, Refills: 2    Associated Diagnoses: Decubitus ulcer, infected, stage III      SPIRIVA WITH HANDIHALER 18 mcg inhalation capsule INHALE THE CONTENTS OF 1 CAPSULE EVERY DAY  Qty: 90 capsule, Refills: 1      temazepam (RESTORIL) 15 mg Cap TAKE 1 CAPSULE ONE TIME DAILY  Qty: 90 capsule, Refills: 3           Time spent on the discharge of patient: 48  elen Ren, BLADIMIRP  Department of Hospital Medicine  [facility]

## 2017-04-11 NOTE — TELEPHONE ENCOUNTER
----- Message from Jannie Pierre sent at 4/11/2017  1:06 PM CDT -----  Patient states she has been discharged from hospital & to please send her pain Rx to    [facility] [address] [place] [address] [place] [address]  Phone: [phone] Fax: [phone] (home)

## 2017-04-11 NOTE — CONSULTS
Nelly ACOSTA Tian [id number] is a 66 y.o. female who has been consulted for vancomycin dosing.    Pharmacy consult for vancomycin dosing in no longer required.  Vancomycin was discontinued.    Thank you for allowing us to participate in this patient's care.     Kayla Kulkarni, Pharm.D.

## 2017-04-11 NOTE — PROGRESS NOTES
SSC met with patient at bedside regarding home health.  Patient signed patient choice disclosure choosing to resume home health with [facility].  SSC sent patient information through [facility] system for processing and acceptance.BERNICE Kirk

## 2017-04-11 NOTE — PLAN OF CARE
Problem: Physical Therapy Goal  Goal: Physical Therapy Goal  Goals to be met by: 2017     Patient will increase functional independence with mobility by performin. Bed to chair transfer with Supervision using Rolling Walker  2. Gait x 100 feet with Contact Guard Assistance using Rolling Walker.   3. Lower extremity exercise program x20 reps per handout, with assistance as needed   Outcome: Ongoing (interventions implemented as appropriate)  Ambulated 150' in hallway with rw and S, with O2 attached.

## 2017-04-11 NOTE — NURSING
Pt to be discharged home with daughter in stable condition. Pt denies SOB and pain. Discharge instructions and medications reviewed with pt, understanding verbalized. Tele monitor returned. Educated pt on lovenox administration, understanding verbalized.

## 2017-04-11 NOTE — TELEPHONE ENCOUNTER
Patient requesting a refill of Oxycodone.  LR--3-31-17  LOV--3-29-17  FOV--4-20-17  Urine Toxicology--9-4-15  Pain Contract--9-4-15

## 2017-04-11 NOTE — TELEPHONE ENCOUNTER
----- Message from Ernestine Pickens sent at 4/11/2017  9:17 AM CDT -----  Contact: pt  refill on pain medication  Call back on # [phone]  thanks      [facility] [address] [place] [address]  [address] [place] [address]  Phone: [phone] Fax: [phone]

## 2017-04-11 NOTE — PLAN OF CARE
04/11/17 1440   Final Note   Assessment Type Final Discharge Note   Discharge Disposition Home-Health   Discharge planning education complete? Yes   Hospital Follow Up  Appt(s) scheduled? Yes   Referral to / orders for Home Health Complete? Yes

## 2017-04-11 NOTE — PROGRESS NOTES
"4/11/17: LCSW contacted pt for follow up.  Pt is still in hospital due to sepsis; she believes she may be discharged today.  Pt self-reports feeling better; she believes her PCP put in orders for her to receive a caregiver.  Pt would like her daughter to be a paid caregiver through a personal care agency.  LCSW contacted [facility] ([phone]) to inquire about pt's daughter becoming a paid caregiver.  TALA was told by [facility] representative how pt's daughter could call their number to apply for the Community [employer] waiver and they will mail her a list of provider agencies if she qualifies.            3/29/17: LCSW attempted to contact pt for follow up.  Pt answered but asked if LCSW could call her back in 15 minutes.  LCSW attempted to call pt back in 15 minutes; no answer, left voicemail.      3/21/17: LCSW contacted pt for follow up.  Pt self-reports recently being discharged from hospital due to skin ulcer.  Pt stated she will begin wound care at [facility] on Thursday.  Pt self-reports her depression as about the same.  LCSW asked pt if she would consider seeing a therapist for her depression and she said she would not; pt said, "I have too many things going on right now and I do not think I could handle another appointment at this time."    3/17/17: LCSW attempted to contact pt for follow up; no answer, left voicemail.  LCSW will send pt an "attempted to contact" message via pt portal.      3/13/17: LCSW attempted to contact pt for follow up; no answer, left voicemail.  LCSW did read encounter about pt being admitted to ED for abdominal wall ulcer with fat layer exposed on 3/10/17.    3/6/17: LCSW contacted pt to complete SW assessment.  This is a 65 yo female pt who lives with her daughter and grandchild.  Pt is a  and her late  served in the [employer]; they have not accessed [facility] for [facility].  Pt has a hx of major depression chronic and " "anxiety.  Pt self-reports her depression coming as a result of losing some functionality approximately six years ago.  Pt states how she used to be someone who was "constantly on the go, and to not be able to do that anymore has caused me to have depression." Pt is not currently receiving counseling or therapy for depression but she does take Lexapro as prescribed.  Pt self-reports being able to perform most of her ADL's but she does occasionally receive assistance from her daughter and grandchild.      3/3/17: LCSW attempted to contact pt; no answer, left voicemail.  "

## 2017-04-11 NOTE — PROGRESS NOTES
Contacted pt's pharmacy [facility] to get copay of lovenox; per Bernadine copay is only $3 however they have to order it and will have it tomorrow at noon.    Updated patient.

## 2017-04-11 NOTE — PT/OT/SLP PROGRESS
Physical Therapy  Treatment    Nelly Tian   MRN: [id number]   Admitting Diagnosis: Sepsis    PT Received On: 17  PT Start Time: 0830     PT Stop Time: 0840    PT Total Time (min): 10 min       Billable Minutes:  Gait [id number]    Treatment Type: Treatment  PT/PTA: PTA     PTA Visit Number: 3       General Precautions: Standard, fall  Orthopedic Precautions: N/A   Braces:           Subjective:  Communicated with nurse Floresita prior to session.  Agreed to ambulate.    Pain Ratin/10                   Objective:   Patient found with: telemetry, oxygen    Functional Mobility:  Bed Mobility:        Transfers:  Sit <> Stand Assistance: Supervision  Sit <> Stand Assistive Device: No Assistive Device    Gait:   Gait Distance: 150' with O2 attached.  Assistance 1: Supervision  Gait Assistive Device: Rolling walker  Gait Pattern: 3-point gait  Gait Deviation(s): decreased madhav    Stairs:      Balance:   Static Sit: GOOD: Takes MODERATE challenges from all directions  Dynamic Sit: GOOD: Maintains balance through MODERATE excursions of active trunk movement  Static Stand: FAIR: Maintains without assist but unable to take challenges  Dynamic stand: FAIR+: Needs CLOSE SUPERVISION during gait and is able to right self with minor LOB     Therapeutic Activities and Exercises:  Seated in chair at bedside.  Ambulated 150' with rw( patient request) and CGA with O2 attached. Has SPC which she prefers.    AM-PAC 6 CLICK MOBILITY  How much help from another person does this patient currently need?   1 = Unable, Total/Dependent Assistance  2 = A lot, Maximum/Moderate Assistance  3 = A little, Minimum/Contact Guard/Supervision  4 = None, Modified [place]/Independent         AM-PAC Raw Score CMS G-Code Modifier Level of Impairment Assistance   6 % Total / Unable   7 - 9 CM 80 - 100% Maximal Assist   10 - 14 CL 60 - 80% Moderate Assist   15 - 19 CK 40 - 60% Moderate Assist   20 - 22 CJ 20 - 40% Minimal Assist    23 CI 1-20% SBA / CGA   24 CH 0% Independent/ Mod I     Patient left up in chair with all lines intact, call button in reach and nurse Floresita notified.    Assessment:  Nelly Tian is a 66 y.o. female with a medical diagnosis of Sepsis and presents with weakness, limited endurance.    Rehab identified problem list/impairments: Rehab identified problem list/impairments: weakness, impaired endurance, impaired cardiopulmonary response to activity    Rehab potential is good.    Activity tolerance: Fair    Discharge recommendations: Discharge Facility/Level Of Care Needs: home with home health     Barriers to discharge:      Equipment recommendations: Equipment Needed After Discharge: none     GOALS:   Physical Therapy Goals        Problem: Physical Therapy Goal    Goal Priority Disciplines Outcome Goal Variances Interventions   Physical Therapy Goal    High PT/OT, PT Ongoing (interventions implemented as appropriate)     Description:  Goals to be met by: 2017     Patient will increase functional independence with mobility by performin. Bed to chair transfer with Supervision using Rolling Walker  2. Gait  x 100 feet with Contact Guard Assistance using Rolling Walker.   3. Lower extremity exercise program x20 reps per handout, with assistance as needed                PLAN:    Patient to be seen 6 x/week  to address the above listed problems via gait training, therapeutic activities, therapeutic exercises  Plan of Care expires: 17  Plan of Care reviewed with: patient         Prema Christos, MONIKA  2017

## 2017-04-12 ENCOUNTER — TELEPHONE (OUTPATIENT)
Dept: FAMILY MEDICINE | Facility: CLINIC | Age: 67
End: 2017-04-12

## 2017-04-12 ENCOUNTER — ANTI-COAG VISIT (OUTPATIENT)
Dept: CARDIOLOGY | Facility: CLINIC | Age: 67
End: 2017-04-12

## 2017-04-12 DIAGNOSIS — Z79.01 LONG TERM CURRENT USE OF ANTICOAGULANT THERAPY: ICD-10-CM

## 2017-04-12 LAB — INR PPP: 2.1

## 2017-04-12 RX ORDER — HYDROCODONE BITARTRATE AND ACETAMINOPHEN 10; 325 MG/1; MG/1
1 TABLET ORAL EVERY 6 HOURS PRN
Qty: 120 TABLET | Refills: 0 | Status: SHIPPED | OUTPATIENT
Start: 2017-04-12 | End: 2017-05-08 | Stop reason: SDUPTHER

## 2017-04-12 NOTE — TELEPHONE ENCOUNTER
Patient notified prescription for Norco was printed and available in office for pick-up. Also notified of Dr. Bird's comments regarding Oxycodone and EMLA Cream. Patient verbalized understanding.

## 2017-04-12 NOTE — TELEPHONE ENCOUNTER
----- Message from Jannie Hansen sent at 4/12/2017 11:00 AM CDT -----  Contact: Maye Tirado  Daughter called regarding refill of medication. Was released from the hospital on yesterday, [facility]. Please contact [phone] (home)     hydrocodone-acetaminophen 10-325mg (NORCO)  mg [facility] [address]  [place] - [address]  [address] [place] [address]  Phone: [phone] Fax: [phone]

## 2017-04-13 LAB — BACTERIA SPEC ANAEROBE CULT: NORMAL

## 2017-04-13 NOTE — TELEPHONE ENCOUNTER
Explained to patient that Emla cream and Hydrocodone has been called in to pharmacy, not oxycodone as this was just for an acute flare up.

## 2017-04-17 ENCOUNTER — ANTI-COAG VISIT (OUTPATIENT)
Dept: CARDIOLOGY | Facility: CLINIC | Age: 67
End: 2017-04-17

## 2017-04-17 ENCOUNTER — LAB VISIT (OUTPATIENT)
Dept: LAB | Facility: HOSPITAL | Age: 67
End: 2017-04-17
Attending: FAMILY MEDICINE
Payer: MEDICARE

## 2017-04-17 DIAGNOSIS — E11.40 DIABETIC NEUROPATHY: Primary | ICD-10-CM

## 2017-04-17 DIAGNOSIS — Z79.01 LONG TERM CURRENT USE OF ANTICOAGULANT THERAPY: ICD-10-CM

## 2017-04-17 DIAGNOSIS — F32.A DEPRESSION: ICD-10-CM

## 2017-04-17 LAB
ANION GAP SERPL CALC-SCNC: 12 MMOL/L
BASOPHILS # BLD AUTO: 0.1 K/UL
BASOPHILS NFR BLD: 0.7 %
BUN SERPL-MCNC: 14 MG/DL
CALCIUM SERPL-MCNC: 9.3 MG/DL
CHLORIDE SERPL-SCNC: 103 MMOL/L
CO2 SERPL-SCNC: 28 MMOL/L
CREAT SERPL-MCNC: 0.7 MG/DL
DIFFERENTIAL METHOD: ABNORMAL
EOSINOPHIL # BLD AUTO: 0.7 K/UL
EOSINOPHIL NFR BLD: 5.8 %
ERYTHROCYTE [DISTWIDTH] IN BLOOD BY AUTOMATED COUNT: 18.4 %
EST. GFR  (AFRICAN AMERICAN): >60 ML/MIN/1.73 M^2
EST. GFR  (NON AFRICAN AMERICAN): >60 ML/MIN/1.73 M^2
GLUCOSE SERPL-MCNC: 88 MG/DL
HCT VFR BLD AUTO: 39.4 %
HGB BLD-MCNC: 12 G/DL
INR PPP: 2.5
LYMPHOCYTES # BLD AUTO: 2.2 K/UL
LYMPHOCYTES NFR BLD: 18.2 %
MCH RBC QN AUTO: 24.8 PG
MCHC RBC AUTO-ENTMCNC: 30.5 %
MCV RBC AUTO: 81 FL
MONOCYTES # BLD AUTO: 1.5 K/UL
MONOCYTES NFR BLD: 12.4 %
NEUTROPHILS # BLD AUTO: 7.7 K/UL
NEUTROPHILS NFR BLD: 62.9 %
PLATELET # BLD AUTO: 291 K/UL
PMV BLD AUTO: 8.9 FL
POTASSIUM SERPL-SCNC: 4.1 MMOL/L
RBC # BLD AUTO: 4.85 M/UL
SODIUM SERPL-SCNC: 143 MMOL/L
WBC # BLD AUTO: 12.2 K/UL

## 2017-04-17 PROCEDURE — 85025 COMPLETE CBC W/AUTO DIFF WBC: CPT

## 2017-04-17 PROCEDURE — 36415 COLL VENOUS BLD VENIPUNCTURE: CPT

## 2017-04-17 PROCEDURE — 80048 BASIC METABOLIC PNL TOTAL CA: CPT

## 2017-04-17 RX ORDER — ALBUTEROL SULFATE 90 UG/1
AEROSOL, METERED RESPIRATORY (INHALATION)
Qty: 18 G | Refills: 3 | Status: SHIPPED | OUTPATIENT
Start: 2017-04-17 | End: 2017-07-04 | Stop reason: SDUPTHER

## 2017-04-18 ENCOUNTER — TELEPHONE (OUTPATIENT)
Dept: FAMILY MEDICINE | Facility: CLINIC | Age: 67
End: 2017-04-18

## 2017-04-18 RX ORDER — IPRATROPIUM BROMIDE AND ALBUTEROL SULFATE 2.5; .5 MG/3ML; MG/3ML
SOLUTION RESPIRATORY (INHALATION)
Qty: 90 ML | Refills: 0 | Status: SHIPPED | OUTPATIENT
Start: 2017-04-18 | End: 2017-05-08 | Stop reason: SDUPTHER

## 2017-04-18 NOTE — TELEPHONE ENCOUNTER
----- Message from Abi Wang sent at 4/18/2017  3:18 PM CDT -----  Contact: Anabella Cowan wants to speak with a nurse regarding a denial please call back at [phone] ext [phone] ref#[id number]

## 2017-04-18 NOTE — TELEPHONE ENCOUNTER
Received message from Anabella with [facility] stating she needed to speak with a nurse regarding a denial. Call placed to Anabella at [phone] ext [phone] regarding ref # [id number] no answer received. Left message to call office.

## 2017-04-19 ENCOUNTER — DOCUMENTATION ONLY (OUTPATIENT)
Dept: FAMILY MEDICINE | Facility: CLINIC | Age: 67
End: 2017-04-19

## 2017-04-19 ENCOUNTER — TELEPHONE (OUTPATIENT)
Dept: FAMILY MEDICINE | Facility: CLINIC | Age: 67
End: 2017-04-19

## 2017-04-19 DIAGNOSIS — F32.A DEPRESSION: ICD-10-CM

## 2017-04-19 PROCEDURE — 99495 TRANSJ CARE MGMT MOD F2F 14D: CPT | Mod: S$GLB,,, | Performed by: PHYSICIAN ASSISTANT

## 2017-04-19 RX ORDER — ESCITALOPRAM OXALATE 20 MG/1
20 TABLET ORAL DAILY
Qty: 90 TABLET | Refills: 3 | Status: SHIPPED | OUTPATIENT
Start: 2017-04-19 | End: 2017-07-18 | Stop reason: ALTCHOICE

## 2017-04-19 RX ORDER — ESCITALOPRAM OXALATE 10 MG/1
TABLET ORAL
Qty: 90 TABLET | Refills: 3 | Status: SHIPPED | OUTPATIENT
Start: 2017-04-19 | End: 2017-04-19 | Stop reason: SDUPTHER

## 2017-04-19 RX ORDER — TEMAZEPAM 15 MG/1
CAPSULE ORAL
Qty: 90 CAPSULE | Refills: 0 | Status: SHIPPED | OUTPATIENT
Start: 2017-04-19 | End: 2017-04-20 | Stop reason: SDUPTHER

## 2017-04-19 NOTE — TELEPHONE ENCOUNTER
----- Message from Brissa Meyers sent at 4/19/2017 11:26 AM CDT -----  Contact: Yaneth with [employer] at [phone] ext [phone]  Yaneth with [employer] at [phone] ext [phone], Returning nurse's call. Call to POD. Please advise. Thanks

## 2017-04-19 NOTE — PROGRESS NOTES
Pre-Visit Chart Review  For Appointment Scheduled on 04/20/2017  Health Maintenance Due   Topic Date Due    DEXA SCAN  11/15/1991    Zoster Vaccine  11/15/2011

## 2017-04-19 NOTE — TELEPHONE ENCOUNTER
Spoke to Anabella with [employer] who states request for heavy duty hospital bed was denied for dates 3-29-17-4-28-18 due to patient not meeting weight requirements for this specific bed. A denial letter will be mailed to office stating the same. Assured Patricia Anabella this information will be forwarded to Dr. Bird for review.

## 2017-04-20 ENCOUNTER — OFFICE VISIT (OUTPATIENT)
Dept: FAMILY MEDICINE | Facility: CLINIC | Age: 67
End: 2017-04-20
Payer: MEDICARE

## 2017-04-20 VITALS
WEIGHT: 293 LBS | BODY MASS INDEX: 44.41 KG/M2 | SYSTOLIC BLOOD PRESSURE: 109 MMHG | HEIGHT: 68 IN | DIASTOLIC BLOOD PRESSURE: 72 MMHG | HEART RATE: 79 BPM | OXYGEN SATURATION: 97 % | TEMPERATURE: 98 F

## 2017-04-20 DIAGNOSIS — L89.893 DECUBITUS ULCER OF OTHER SITE, STAGE 3: ICD-10-CM

## 2017-04-20 DIAGNOSIS — E66.01 MORBID OBESITY WITH BMI OF 45.0-49.9, ADULT: ICD-10-CM

## 2017-04-20 DIAGNOSIS — M51.36 DDD (DEGENERATIVE DISC DISEASE), LUMBAR: Chronic | ICD-10-CM

## 2017-04-20 DIAGNOSIS — R16.0 HEPATOMEGALY: ICD-10-CM

## 2017-04-20 DIAGNOSIS — E11.9 CONTROLLED TYPE 2 DIABETES MELLITUS WITHOUT COMPLICATION, WITHOUT LONG-TERM CURRENT USE OF INSULIN: Chronic | ICD-10-CM

## 2017-04-20 DIAGNOSIS — E11.610 DIABETIC NEUROGENIC ARTHROPATHY: ICD-10-CM

## 2017-04-20 DIAGNOSIS — J43.0 UNILATERAL EMPHYSEMA: Primary | ICD-10-CM

## 2017-04-20 PROCEDURE — 99999 PR PBB SHADOW E&M-EST. PATIENT-LVL III: CPT | Mod: PBBFAC,,, | Performed by: FAMILY MEDICINE

## 2017-04-20 PROCEDURE — 1160F RVW MEDS BY RX/DR IN RCRD: CPT | Mod: S$GLB,,, | Performed by: FAMILY MEDICINE

## 2017-04-20 PROCEDURE — 99499 UNLISTED E&M SERVICE: CPT | Mod: S$GLB,,, | Performed by: FAMILY MEDICINE

## 2017-04-20 PROCEDURE — 3074F SYST BP LT 130 MM HG: CPT | Mod: S$GLB,,, | Performed by: FAMILY MEDICINE

## 2017-04-20 PROCEDURE — 99214 OFFICE O/P EST MOD 30 MIN: CPT | Mod: S$GLB,,, | Performed by: FAMILY MEDICINE

## 2017-04-20 PROCEDURE — 4010F ACE/ARB THERAPY RXD/TAKEN: CPT | Mod: S$GLB,,, | Performed by: FAMILY MEDICINE

## 2017-04-20 PROCEDURE — 3078F DIAST BP <80 MM HG: CPT | Mod: S$GLB,,, | Performed by: FAMILY MEDICINE

## 2017-04-20 PROCEDURE — 3044F HG A1C LEVEL LT 7.0%: CPT | Mod: S$GLB,,, | Performed by: FAMILY MEDICINE

## 2017-04-20 RX ORDER — TEMAZEPAM 30 MG/1
30 CAPSULE ORAL NIGHTLY PRN
Qty: 30 CAPSULE | Refills: 1 | Status: SHIPPED | OUTPATIENT
Start: 2017-04-20 | End: 2017-05-08 | Stop reason: SDUPTHER

## 2017-04-20 RX ORDER — FLUCONAZOLE 100 MG/1
100 TABLET ORAL DAILY
Qty: 5 TABLET | Refills: 0 | Status: ON HOLD | OUTPATIENT
Start: 2017-04-20 | End: 2017-04-28 | Stop reason: HOSPADM

## 2017-04-20 RX ORDER — GABAPENTIN 600 MG/1
600 TABLET ORAL 3 TIMES DAILY
Qty: 270 TABLET | Refills: 3 | Status: SHIPPED | OUTPATIENT
Start: 2017-04-20 | End: 2017-07-18 | Stop reason: SDUPTHER

## 2017-04-20 NOTE — LETTER
2017      To whom it may concern,             [facility]  [address]  [place] [address]  Phone: [phone]  Fax: [phone]   Patient: Nelly Tian   MR Number: [id number]   YOB: 1950   Date of Visit: 2017       Dear To Whom it may concern    I am referring my patient, Nelly Tian, to you for evaluation of Hospital Bed due to shortness of breath due to COPD and need for special bed position . She is unable to reach appropriate tidal volume due to her chronic hypoxia and restrictive lung disease.    She  has a past medical history of *Atrial flutter; Anxiety; Arthritis; Asthma; Atrial fibrillation; Back pain; Cataract; CHF (congestive heart failure); COPD (chronic obstructive pulmonary disease); Depression; Diabetes mellitus; Emphysema of lung; Heart failure; Hernia; Hypercapnic respiratory failure, chronic (2016); Hyperlipidemia; Hypertension; Iron deficiency anemia (2/3/2016); Myocardial infarction; Obesity; Peripheral vascular disease; Pneumonia; Polyneuropathy; Retinal detachment; Skin ulcer (3/18/2017); Tobacco dependence; and Type II 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, not stated as uncontrolled. Her  has a past surgical history that includes  section; Oophorectomy; Retinal detachment surgery; Cataract extraction (Left); Eye surgery; Hysterectomy; and Tonsillectomy. She  reports that she quit smoking about 19 months ago. Her smoking use included Cigarettes. She started smoking about 49 years ago. She has a 12.50 pack-year smoking history. She quit smokeless tobacco use about 2 months ago. She reports that she does not drink alcohol or use illicit drugs.    She has a current medication list which includes the following prescription(s): acetaminophen, albuterol, albuterol-ipratropium 2.5mg-0.5mg/3ml, ascorbic acid (vitamin c), atorvastatin, budesonide, clonazepam, escitalopram oxalate, furosemide,  gabapentin, hydrocodone-acetaminophen 10-325mg, lisinopril, magnesium hydroxide 400 mg/5 ml, magnesium oxide, metformin, methocarbamol, metoprolol succinate, miconazole nitrate 2 %, multivit, ca, min-fa-soy isofl, multivitamin, polyethylene glycol, potassium chloride sa, santyl, spiriva with handihaler, temazepam, ventolin hfa, warfarin, and zinc sulfate. She is allergic to dilaudid [hydromorphone].    I appreciate your assistance in her care and look forward to your findings and recommendations.    Sincerely,                           Constantine Bird MD

## 2017-04-20 NOTE — PROGRESS NOTES
The patient is a 66 y.o. female with hx of a chronic left abdominal wound, who is presenting with  S/p hospitalization  With acute onset fever of 103 PTA and dyspnea which began today. Pt was sent to ER by [facility] home health nurse. Pt reports she does not feel well, and has no other associated complaints. She was recently hospitalized for her left sided abdominal wound and finished a round of Augmentin.   Pt reports she does not feel well, and has no other associated complaints. She has Proteus infection with sensitive to Cipro.   Patient Active Problem List   Diagnosis    Diabetes mellitus with neuropathy    Long term current use of anticoagulant therapy    Chronic obstructive pulmonary disease with acute exacerbation    Arthritis    Major depression, recurrent, chronic    DM II (diabetes mellitus, type II), controlled    BMI 36.0-36.9,adult    GERD (gastroesophageal reflux disease)    Tobacco dependency    Asthma    Chronic atrial fibrillation    Hypertension associated with diabetes    Morbid obesity with BMI of 45.0-49.9, adult    PVD (peripheral vascular disease)    Abdominal aortic atherosclerosis    Dependency on pain medication    Chronic combined systolic and diastolic congestive heart failure    History of MI (myocardial infarction)    Iron deficiency anemia    Hepatomegaly    DDD (degenerative disc disease), lumbar    Anxiety    Type II 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, not stated as uncontrolled    Hyperlipidemia    NSAID long-term use    Osteoarthritis of right hip    Mixed restrictive and obstructive lung disease    Hypercapnic respiratory failure, chronic    Chronic respiratory failure with hypoxia    Obesity, Class III, BMI 40-49.9 (morbid obesity)    Acute on chronic diastolic congestive heart failure    Yeast dermatitis    Nonhealing skin ulcer, limited to breakdown of skin    Pressure ulcer, stage 3    Infected wound     "COPD (chronic obstructive pulmonary disease)    Hypomagnesemia    Hypokalemia    Microcytic anemia    Cellulitis, abdominal wall    Constipation       Diabetes Mellitus Type II, Follow-up: Patient here for follow-up of Type 2 diabetes mellitus.  Current symptoms/problems include nausea, paresthesia of the feet and visual disturbances and have been improving. Symptoms have been present for a few weeks.  Discharge Information     Medication & Order Review   Transitional Care Note    Family and/or Caretaker present at visit?  Yes.  Diagnostic tests reviewed/disposition: I have reviewed all completed as well as pending diagnostic tests at the time of discharge.  Disease/illness education: yes.  Home health/community services discussion/referrals: Patient does not have home health established from hospital visit.  They do not need home health.  If needed, we will set up home health for the patient.   Establishment or re-establishment of referral orders for community resources: No other necessary community resources.   Discussion with other health care providers: discussion with Dr Gipson, Jazmine, and Andrew..               Known diabetic complications: nephropathy, peripheral neuropathy, cardiovascular disease and peripheral vascular disease  Cardiovascular risk factors: advanced age (older than 55 for men, 65 for women), diabetes mellitus, dyslipidemia, family history of premature cardiovascular disease, hypertension, microalbuminuria, obesity (BMI >= 30 kg/m2) and sedentary lifestyle  Current diabetic medications include intensive insulin injection program.     Eye exam current (within one year): no  Weight trend: increasing rapidly  Prior visit with dietician: yes - .  Current diet: in general, a "healthy" diet    Current exercise: none    Current monitoring regimen: home blood tests - 3 times daily  Home blood sugar records: fasting range: 140, postprandial range: 180 and trend: stable  Any episodes of " hypoglycemia? no    Is She on ACE inhibitor or angiotensin II receptor blocker?   Yes   lisinopril (Zestril)  .  Endocrine ROS: positive for - hair pattern changes, malaise/lethargy, mood swings, polydipsia/polyuria, skin changes and unexpected weight changes    The patient appears oriented to person, place, and time, morbidly obese, acyanotic, in no respiratory distress, well hydrated, anxious, in mild to moderate distress and ill-appearing. ENT: ENT exam normal, no neck nodes or sinus tenderness, neck without nodes, throat normal without erythema or exudate and post nasal drip noted. Chest:decreased posterior fields at auscultation, no wheezes, rales or rhonchi, symmetric air entry, no tachypnea, retractions or cyanosis. Heart sounds are normal. Abdomen soft, nontender, no masses or organomegaly.  Abdominal exam: soft, nontender, nondistended, no masses or organomegaly  no rebound tenderness noted  no bladder distension noted  no abdominal bruits  no pulsatile masses  no CVA tenderness  HERNIA EXAM: left inguinal hernia huge 3 feet. Cellulitis improving.  Large hernia, with several ulcers, stage II without necrotic tissue, echymosis.skin folds with dry ulcer, and mild erythema.  She needs Hospital bed due to severe restrictive lung due to abdominal obesity and large hernia.     Chemistry        Component Value Date/Time     04/17/2017 1538    K 4.1 04/17/2017 1538     04/17/2017 1538    CO2 28 04/17/2017 1538    BUN 14 04/17/2017 1538    CREATININE 0.7 04/17/2017 1538    GLU 88 04/17/2017 1538        Component Value Date/Time    CALCIUM 9.3 04/17/2017 1538    ALKPHOS 88 04/05/2017 1500    AST 19 04/05/2017 1500    ALT 11 04/05/2017 1500    BILITOT 0.6 04/05/2017 1500        Lab Results   Component Value Date    WBC 12.20 04/17/2017    HGB 12.0 04/17/2017    HCT 39.4 04/17/2017    MCV 81 (L) 04/17/2017     04/17/2017   Unilateral emphysema  -     HOSPITAL BED FOR HOME USE    Decubitus ulcer of  other site, stage 3    Controlled type 2 diabetes mellitus without complication, without long-term current use of insulin    Morbid obesity with BMI of 45.0-49.9, adult    Hepatomegaly    DDD (degenerative disc disease), lumbar    Diabetic neurogenic arthropathy  -     gabapentin (NEURONTIN) 600 MG tablet; Take 1 tablet (600 mg total) by mouth 3 (three) times daily.  Dispense: 270 tablet; Refill: 3    Other orders  -     temazepam (RESTORIL) 30 mg capsule; Take 1 capsule (30 mg total) by mouth nightly as needed.  Dispense: 30 capsule; Refill: 1  -     fluconazole (DIFLUCAN) 100 MG tablet; Take 1 tablet (100 mg total) by mouth once daily.  Dispense: 5 tablet; Refill: 0        Patient readiness: acceptance and barriers:readiness and social stressors    During the course of the visit the patient was educated and counseled about the following:     Diabetes:  Discussed general issues about diabetes pathophysiology and management.  Discussed ways to avoid symptomatic hypoglycemia.  Diabetes educator referral.  Reminded to bring in blood sugar diary at next visit.  Follow up in 6 weeks or as needed.  Hypertension:   Regular aerobic exercise.  Check blood pressures daily and record.  Obesity:   General weight loss/lifestyle modification strategies discussed (elicit support from others; identify saboteurs; non-food rewards, etc).    Goals: Diabetes: Maintain Hemoglobin A1C below 7, Hypertension: Reduce Blood Pressure and Obesity: Reduce calorie intake and BMI    Did patient meet goals/outcomes: Yes    The following self management tools provided: blood pressure log  blood glucose log  excercise log    Patient Instructions (the written plan) was given to the patient/family.     Time spent with patient: 45 minutes

## 2017-04-20 NOTE — MR AVS SNAPSHOT
[facility]  [address]  [place] [address]  Phone: [phone]  Fax: [phone]                  Nelly Tian   2017 2:20 PM   Office Visit    Description:  Female : 1950   Provider:  Constantine Bird MD   Department:  [facility]           Reason for Visit     Hypertension     TCC follow up           Diagnoses this Visit        Comments    Unilateral emphysema    -  Primary     Decubitus ulcer of other site, stage 3         Controlled type 2 diabetes mellitus without complication, without long-term current use of insulin         Morbid obesity with BMI of 45.0-49.9, adult         Hepatomegaly         DDD (degenerative disc disease), lumbar         Diabetic neurogenic arthropathy                To Do List           Future Appointments        Provider Department Dept Phone    2017 2:00 PM Constantine Bird MD [facility] [phone]    2017 9:20 AM LAB[facility] [phone]    2017 2:20 PM Constantine Bird MD [facility] [phone]    2017 1:20 PM Laura Ramos MD [facility] - Hematology Oncology [phone]    2017 1:30 PM Chu Mack MD [facility] Cardiology [phone]      Goals (5 Years of Data)              4/11/17    3/21/17    3/15/17    Patient/caregiver will accept life style changes to manage and improve  CHF  prior to discharge from [facility].       On track    Notes - Note edited  3/15/2017  2:56 PM by Jayde Reyes    Overall Time to Completion  2 months from 2017    Short Term Goals  Patient/caregiver will discuss health care needs with Physician and care team during visits or using Patient Portal.  Interventions   · Collaborate with Physician as appropriate to meet patient needs.  · Discuss appropriate use of Home health with patient/caregiver.  · Empower patient/caregiver to discuss treatment plan with Physician/care team.  · Provide  contact information for [facility] on Call contact information.  · Provide contact information for Outpatient Case Management contact information.  · Provide contact information for Physician office phone number.  · Encourage compliance with Physician follow-ups.   Status  · Met     Patient/caregiver will notify doctor if patient gains more than 3 pounds in one day or 5 pounds in one week within 72 hours.  Interventions   · Recognize and provide educational material (LESLIEMES).  · Mail Weight log for home use.   Status  · Met     Patient/caregiver will verbalize 2 food items Low Sodium within 1 month.  Interventions   · Recognize and provide educational material (KRAMES).  · Encourage Dietary Compliance.  · Review eating/nutrition habits.   Status  · Partially met    Patient/caregiver will verbalize 2 signs and symptoms of Congestive Heart Failure within 2 months.   Interventions   · Recognize and provide educational material (KRAMES).   Status  · Met     Patient/caregiver will verbalize understanding and will follow hospital discharge plan 1 week   Interventions   · Encourage Dietary Compliance.  · Encourage Medication Compliance.  · Encourage Smoking Cessation.   Status  · Met           Patient/caregiver will have adequate mental health support/resources prior to discharge from [facility].   On track  On track  On track    Notes - Note created  3/6/2017  9:42 AM by Chu Desai III, LCSW    Overall Time to Completion  2 months from 03/06/2017    Short Term Goals  Patient/caregiver will contact [employer] for guidance within 1 month.  Interventions   · Collaborate with external provider as appropriate to meet patient needs.   · Coordinate mental health services.  · Encourage communication with providers.  · Encourage compliance with medical/mental health appointments.  · Encourage family/social  and involvement in care.  · Facilitate referrals as appropriate.  · Provide crisis intervention  hotline.  · Provide information on [employer].  · Provide mental/behavioral health resource(s).  · Provide support group information.  · Provide supportive counseling.   Status  · Partially met            COMPLETED: Patient/caregiver will have an action plan in place to manage and prevent complications of falls  prior to discharge from [facility].           Notes - Note edited  3/1/2017  1:47 PM by Jayde Reyes    Overall Time to Completion  2 months from 02/21/2017    Short Term Goals  Patient/caregiver will put in place 2 keeping room free of clutter, making sure no electrical cords placed in walk ways and making sure rooms are well lit measures to decrease the risk of patient falls within 1 month.  Interventions   · Recognize and provide educational material (SLIME).   Status  · Met             Patient/caregiver will have an action plan in place to manage and prevent complications of infectious disesase  prior to discharge from [facility].           Notes - Note created  3/15/2017  3:12 PM by Jayde Reyes    Overall Time to Completion  1 month from 03/15/2017    Short Term Goals  Patient/caregiver will identify 2 deficits resulting from infectious disease wound  and list 2 ways to overcome those deficits within 1 month.  Interventions   · Collaborate with Physician as appropriate to meet patient needs.  · Discuss alternate Levels of Care with patient/caregiver.  · Discuss appropriate use of Home health with patient/caregiver.  · Empower patient/caregiver to discuss treatment plan with Physician/care team.  · Recognize and provide educational material (SLIME).   Status  · Partially met    Patient/caregiver will identify 1 supports or services to maintain or improve current functional status within 2 weeks.  Interventions   · Refer to [facility] wound care-appt on 03/20/17   Status  · Partially met    Patient/caregiver will verbalize 2 strategies to limit/reduce the risk of infections within 1  month.  Interventions   · Recognize and provide educational material (SLIME).  · Encourage Dietary Compliance.  · Review eating/nutrition habits.  · Complete medication reconciliation.  · Encourage Medication Compliance.   Status  · Partially met    Patient/caregiver will verbalize 1 red flags of infectious disease  and know when to contact Physician within 1 month.  Interventions   · Recognize and provide educational material (SLIME).   Status  · Partially met        ·           COMPLETED: Patient/caregiver will have knowledge of resources available in order to obtain the services that are needed prior to discharge from [facility].           Notes - Note edited  3/1/2017  1:47 PM by Jayde Reyes    Overall Time to Completion  2 months from 02/21/2017    Short Term Goals  Patient/caregiver will have contact information for identified community resources IE:[employer] for eyeglasses, [facility], Food Pantries   for follow-up within 1 month.  Interventions   · Provide contact information for [employer] Resource [employer] ( [place]).   Status  · Met           COMPLETED: Patient/caregiver will have knowledge of resources available in order to obtain the services that are needed prior to discharge from [facility].           Notes - Note edited  3/15/2017  2:57 PM by Jayde Reyes    Overall Time to Completion  1 month from 03/01/2017    Short Term Goals  Patient/caregiver will have contact information for identified community resources IE:[facility]  for follow-up within 2 weeks.  Interventions   · Refer to Outpatient Case Management Social Worker.   Status  · Met    Patient/caregiver will identify 2 supports or services to maintain or improve current functional status within 2 weeks.  Interventions   · Refer to Outpatient Case Management Social Worker.  · Provide contact information for Community [employer] Resource Database ( [place]).   Status  · Met          Patient/caregiver will  have knowledge of resources available in order to obtain the services that are needed prior to discharge from [facility].           Notes - Note created  4/6/2017  1:42 PM by Jayde Reyes    Overall Time to Completion  1 month from 04/06/2017    Short Term Goals  Patient/caregiver will discuss health care needs with Physician and care team during visits or using Patient Portal.  Interventions   · Collaborate with Inpatient case management regarding Discharge Plan.   Status  · Partially met            Follow-Up and Disposition     Return in about 6 weeks (around 6/1/2017).       These Medications        Disp Refills Start End    gabapentin (NEURONTIN) 600 MG tablet 270 tablet 3 4/20/2017 4/20/2018    Take 1 tablet (600 mg total) by mouth 3 (three) times daily. - Oral    Pharmacy: [facility] - [address] Ph #: [phone]       temazepam (RESTORIL) 30 mg capsule 30 capsule 1 4/20/2017     Take 1 capsule (30 mg total) by mouth nightly as needed. - Oral    Pharmacy: [facility] - [facility] [address] [facility] [address] [facility] Ph #: [phone]       fluconazole (DIFLUCAN) 100 MG tablet 5 tablet 0 4/20/2017 4/25/2017    Take 1 tablet (100 mg total) by mouth once daily. - Oral    Pharmacy: [facility] [address] [place] [address] Ph #: [phone]         [facility] On Call     [facility] On Call Nurse Care Line - 24/7 Assistance  Unless otherwise directed by your provider, please contact [facility], our nurse care line that is available for 24/7 assistance.     Registered nurses in the [facility] Center provide: appointment scheduling, clinical advisement, health education, and other advisory services.  Call: [phone] (toll free)               Medications           Message regarding Medications     Verify the changes and/or additions to your medication regime listed below are the same as discussed with your clinician today.  If any of  these changes or additions are incorrect, please notify your healthcare provider.        START taking these NEW medications        Refills    fluconazole (DIFLUCAN) 100 MG tablet 0    Sig: Take 1 tablet (100 mg total) by mouth once daily.    Class: Normal    Route: Oral      CHANGE how you are taking these medications     Start Taking Instead of    temazepam (RESTORIL) 30 mg capsule temazepam (RESTORIL) 15 mg Cap    Dosage:  Take 1 capsule (30 mg total) by mouth nightly as needed. Dosage:  TAKE 1 CAPSULE ONE TIME DAILY    Reason for Change:  Reorder            Verify that the below list of medications is an accurate representation of the medications you are currently taking.  If none reported, the list may be blank. If incorrect, please contact your healthcare provider. Carry this list with you in case of emergency.           Current Medications     acetaminophen (TYLENOL) 325 MG tablet Take 2 tablets (650 mg total) by mouth every 6 (six) hours as needed.    albuterol (ACCUNEB) 0.63 mg/3 mL Nebu INHALE THE CONTENTS OF 1 VIAL  BY  NEBULIZER EVERY 6 HOURS AS NEEDED    albuterol-ipratropium 2.5mg-0.5mg/3mL (DUO-NEB) 0.5 mg-3 mg(2.5 mg base)/3 mL nebulizer solution INHALE THE CONTENTS OF 1 VIAL VIA NEBULIZER EVERY 6 HOURS AS NEEDED FOR  WHEEZING (DO NOT USE WITH ALBUTEROL INHALER)    ascorbic acid, vitamin C, (VITAMIN C) 500 MG tablet Take 1 tablet (500 mg total) by mouth every evening.    atorvastatin (LIPITOR) 20 MG tablet Take 1 tablet (20 mg total) by mouth once daily.    budesonide (PULMICORT) 0.5 mg/2 mL nebulizer solution INHALE THE CONTENTS OF 1 VIAL VIA NEBULIZER EVERY DAY    clonazePAM (KLONOPIN) 1 MG tablet Take 1 tablet (1 mg total) by mouth 2 (two) times daily as needed for Anxiety.    escitalopram oxalate (LEXAPRO) 20 MG tablet Take 1 tablet (20 mg total) by mouth once daily.    furosemide (LASIX) 40 MG tablet Take 1 tablet (40 mg total) by mouth 2 (two) times daily.    gabapentin (NEURONTIN) 600 MG tablet  Take 1 tablet (600 mg total) by mouth 3 (three) times daily.    hydrocodone-acetaminophen 10-325mg (NORCO)  mg Tab Take 1 tablet by mouth every 6 (six) hours as needed for Pain.    lisinopril (PRINIVIL,ZESTRIL) 20 MG tablet Take 0.5 tablets (10 mg total) by mouth once daily.    magnesium hydroxide 400 mg/5 ml (MILK OF MAGNESIA) 400 mg/5 mL Susp Take 30 mLs by mouth daily as needed.    magnesium oxide (MAG-OX) 400 mg tablet Take 1 tablet (400 mg total) by mouth 2 (two) times daily.    metformin (GLUCOPHAGE) 500 MG tablet TAKE 1 TABLET TWICE DAILY WITH MEALS    methocarbamol (ROBAXIN) 750 MG Tab TAKE 1 TABLET FOUR TIMES DAILY    metoprolol succinate (TOPROL-XL) 25 MG 24 hr tablet Take 0.5 tablets (12.5 mg total) by mouth once daily. On hold waiting for dr visit    miconazole NITRATE 2 % (MICOTIN) 2 % top powder Apply topically 2 (two) times daily. Skin folds    multivit, Ca, min-FA-soy isofl 400-60 mcg-mg Tab Take 1 tablet by mouth once daily.    multivitamin (THERAGRAN) tablet Take 1 tablet by mouth once daily.    polyethylene glycol (GLYCOLAX) 17 gram/dose powder MIX 17 GRAMS IN LIQUID AND DRINK EVERY DAY AS NEEDED    potassium chloride SA (K-DUR,KLOR-CON) 20 MEQ tablet Take 1 tablet (20 mEq total) by mouth once daily.    SANTYL ointment APPLY  OINTMENT TOPICALLY TO AFFECTED AREA ONCE DAILY    SPIRIVA WITH HANDIHALER 18 mcg inhalation capsule INHALE THE CONTENTS OF 1 CAPSULE EVERY DAY    temazepam (RESTORIL) 30 mg capsule Take 1 capsule (30 mg total) by mouth nightly as needed.    VENTOLIN HFA 90 mcg/actuation inhaler INHALE TWO PUFFS BY MOUTH EVERY 6 HOURS AS NEEDED FOR WHEEZING    warfarin (COUMADIN) 5 MG tablet Take 0.5 tablets (2.5 mg total) by mouth Daily.    zinc sulfate (ZINCATE) 220 (50) mg capsule Take 1 capsule (220 mg total) by mouth once daily.    fluconazole (DIFLUCAN) 100 MG tablet Take 1 tablet (100 mg total) by mouth once daily.           Clinical Reference Information           Your Vitals  "Were     BP Pulse Temp Height Weight SpO2    109/72 (BP Location: Right arm, Patient Position: Sitting, BP Method: Automatic) 79 98.2 °F (36.8 °C) (Oral) 5' 8" (1.727 m) 133 kg (293 lb 3.4 oz) 97%    BMI                44.58 kg/m2          Blood Pressure          Most Recent Value    BP  109/72      Allergies as of 4/20/2017     Dilaudid [Hydromorphone]      Immunizations Administered on Date of Encounter - 4/20/2017     None      Orders Placed During Today's Visit      Normal Orders This Visit    HOSPITAL BED FOR HOME USE       Language Assistance Services     ATTENTION: Language assistance services are available, free of charge. Please call [phone].      ATENCIÓN: Si habla jhony, tiene a dimas disposición servicios gratuitos de asistencia lingüística. Llame al [phone].     CHÚ Ý: N?u b?n nói Ti?ng Vi?t, có các d?ch v? h? tr? ngôn ng? mi?n phí dành cho b?n. G?i s? [phone].         [facility] complies with applicable Federal civil rights laws and does not discriminate on the basis of race, color, national origin, age, disability, or sex.        "

## 2017-04-20 NOTE — LETTER
April 20, 2017    Nelly Tian  [address]  [place] [address]     My [facility],  Community [facility],    [place] [facility]  [address] [address]  Phone: [phone]  Fax: [phone] April 20, 2017     Patient: Nelly Tian   YOB: 1950   Date of Visit: 4/20/2017       To Whom It May Concern:    It is my medical opinion that Nelly Tian . Ms Maye Tirado needs to  PCA care 8 hrs a day for Ms Nelly Tian.    If you have any questions or concerns, please don't hesitate to call.    Sincerely,        Constantine Bird MD

## 2017-04-21 ENCOUNTER — OUTPATIENT CASE MANAGEMENT (OUTPATIENT)
Dept: ADMINISTRATIVE | Facility: OTHER | Age: 67
End: 2017-04-21

## 2017-04-21 NOTE — PROGRESS NOTES
04/21/17-Patient called and left voice mail. Called patient back and went over hospital discharge instructions with her. Patient is to increase her protein and take vitamin C also. Patient continues to go to wound care for her Left abdominal wound. Patient knows signs and symptoms of infection. Will mail to patient Vitamin C supplements. Will follow up with patient in three weeks.

## 2017-04-22 ENCOUNTER — HOSPITAL ENCOUNTER (INPATIENT)
Facility: HOSPITAL | Age: 67
LOS: 5 days | Discharge: HOME HEALTH | DRG: 872 | End: 2017-04-28
Attending: EMERGENCY MEDICINE | Admitting: HOSPITALIST
Payer: MEDICARE

## 2017-04-22 DIAGNOSIS — A41.9 SEPTIC SHOCK: ICD-10-CM

## 2017-04-22 DIAGNOSIS — F43.0 ANXIETY IN ACUTE STRESS REACTION: ICD-10-CM

## 2017-04-22 DIAGNOSIS — E87.5 HYPERKALEMIA: Primary | ICD-10-CM

## 2017-04-22 DIAGNOSIS — I48.91 ATRIAL FIBRILLATION, RAPID: ICD-10-CM

## 2017-04-22 DIAGNOSIS — A41.9 SEPSIS: ICD-10-CM

## 2017-04-22 DIAGNOSIS — L03.311 CELLULITIS, ABDOMINAL WALL: ICD-10-CM

## 2017-04-22 DIAGNOSIS — R65.21 SEPTIC SHOCK: ICD-10-CM

## 2017-04-22 DIAGNOSIS — F41.1 ANXIETY IN ACUTE STRESS REACTION: ICD-10-CM

## 2017-04-22 PROCEDURE — 96375 TX/PRO/DX INJ NEW DRUG ADDON: CPT

## 2017-04-22 PROCEDURE — 96365 THER/PROPH/DIAG IV INF INIT: CPT

## 2017-04-22 PROCEDURE — 99285 EMERGENCY DEPT VISIT HI MDM: CPT | Mod: 25

## 2017-04-22 NOTE — IP AVS SNAPSHOT
[address] [place] [address]  Phone: [phone]           Patient Discharge Instructions   Our goal is to set you up for success. This packet includes information on your condition, medications, and your home care.  It will help you care for yourself to prevent having to return to the hospital.     Please ask your nurse if you have any questions.      There are many details to remember when preparing to leave the hospital. Here is what you will need to do:    1. Take your medicine. If you are prescribed medications, review your Medication List on the following pages. You may have new medications to  at the pharmacy and others that you'll need to stop taking. Review the instructions for how and when to take your medications. Talk with your doctor or nurses if you are unsure of what to do.     2. Go to your follow-up appointments. Specific follow-up information is listed in the following pages. Your may be contacted by a nurse or clinical provider about future appointments. Be sure we have all of the phone numbers to reach you. Please contact your provider's office if you are unable to make an appointment.     3. Watch for warning signs. Your doctor or nurse will give you detailed warning signs to watch for and when to call for assistance. These instructions may also include educational information about your condition. If you experience any of warning signs to your health, call your doctor.               ** Verify the list of medication(s) below is accurate and up to date. Carry this with you in case of emergency. If your medications have changed, please notify your healthcare provider.             Medication List      START taking these medications        Additional Info                      cephALEXin 500 MG capsule   Commonly known as:  KEFLEX   Quantity:  40 capsule   Refills:  0   Dose:  500 mg   Indications:  Skin & soft tissue    Instructions:  Take 1  capsule (500 mg total) by mouth every 6 (six) hours.     Begin Date    AM    Noon    PM    Bedtime         CHANGE how you take these medications        Additional Info                      budesonide 0.5 mg/2 mL nebulizer solution   Commonly known as:  PULMICORT   Quantity:  180 mL   Refills:  4   What changed:  See the new instructions.    Instructions:  INHALE THE CONTENTS OF 1 VIAL VIA NEBULIZER EVERY DAY     Begin Date    AM    Noon    PM    Bedtime       SANTYL ointment   Quantity:  90 g   Refills:  2   What changed:  See the new instructions.   Generic drug:  collagenase    Instructions:  APPLY  OINTMENT TOPICALLY TO AFFECTED AREA ONCE DAILY     Begin Date    AM    Noon    PM    Bedtime         CONTINUE taking these medications        Additional Info                      acetaminophen 325 MG tablet   Commonly known as:  TYLENOL   Refills:  0   Dose:  650 mg    Last time this was given:  650 mg on 4/25/2017  7:48 AM   Instructions:  Take 2 tablets (650 mg total) by mouth every 6 (six) hours as needed.     Begin Date    AM    Noon    PM    Bedtime       * albuterol 0.63 mg/3 mL Nebu   Commonly known as:  ACCUNEB   Quantity:  75 mL   Refills:  11    Instructions:  INHALE THE CONTENTS OF 1 VIAL  BY  NEBULIZER EVERY 6 HOURS AS NEEDED     Begin Date    AM    Noon    PM    Bedtime       * VENTOLIN HFA 90 mcg/actuation inhaler   Quantity:  18 g   Refills:  3   Generic drug:  albuterol    Instructions:  INHALE TWO PUFFS BY MOUTH EVERY 6 HOURS AS NEEDED FOR WHEEZING     Begin Date    AM    Noon    PM    Bedtime       albuterol-ipratropium 2.5mg-0.5mg/3mL 0.5 mg-3 mg(2.5 mg base)/3 mL nebulizer solution   Commonly known as:  DUO-NEB   Quantity:  90 mL   Refills:  0    Instructions:  INHALE THE CONTENTS OF 1 VIAL VIA NEBULIZER EVERY 6 HOURS AS NEEDED FOR  WHEEZING (DO NOT USE WITH ALBUTEROL INHALER)     Begin Date    AM    Noon    PM    Bedtime       ascorbic acid (vitamin C) 500 MG tablet   Commonly known as:  VITAMIN C    Refills:  0   Dose:  500 mg    Instructions:  Take 1 tablet (500 mg total) by mouth every evening.     Begin Date    AM    Noon    PM    Bedtime       atorvastatin 20 MG tablet   Commonly known as:  LIPITOR   Quantity:  90 tablet   Refills:  3   Dose:  20 mg    Last time this was given:  20 mg on 4/28/2017  9:23 AM   Instructions:  Take 1 tablet (20 mg total) by mouth once daily.     Begin Date    AM    Noon    PM    Bedtime       clonazePAM 1 MG tablet   Commonly known as:  KLONOPIN   Quantity:  60 tablet   Refills:  4   Dose:  1 mg    Last time this was given:  1 mg on 4/27/2017  8:33 PM   Instructions:  Take 1 tablet (1 mg total) by mouth 2 (two) times daily as needed for Anxiety.     Begin Date    AM    Noon    PM    Bedtime       escitalopram oxalate 20 MG tablet   Commonly known as:  LEXAPRO   Quantity:  90 tablet   Refills:  3   Dose:  20 mg    Last time this was given:  10 mg on 4/28/2017  9:22 AM   Instructions:  Take 1 tablet (20 mg total) by mouth once daily.     Begin Date    AM    Noon    PM    Bedtime       furosemide 40 MG tablet   Commonly known as:  LASIX   Quantity:  60 tablet   Refills:  0   Dose:  40 mg    Last time this was given:  40 mg on 4/28/2017  9:22 AM   Instructions:  Take 1 tablet (40 mg total) by mouth 2 (two) times daily.     Begin Date    AM    Noon    PM    Bedtime       gabapentin 600 MG tablet   Commonly known as:  NEURONTIN   Quantity:  270 tablet   Refills:  3   Dose:  600 mg    Instructions:  Take 1 tablet (600 mg total) by mouth 3 (three) times daily.     Begin Date    AM    Noon    PM    Bedtime       hydrocodone-acetaminophen 10-325mg  mg Tab   Commonly known as:  NORCO   Quantity:  120 tablet   Refills:  0   Dose:  1 tablet    Last time this was given:  1 tablet on 4/28/2017  4:06 PM   Instructions:  Take 1 tablet by mouth every 6 (six) hours as needed for Pain.     Begin Date    AM    Noon    PM    Bedtime       lisinopril 20 MG tablet   Commonly known as:   PRINIVIL,ZESTRIL   Refills:  0   Dose:  10 mg    Instructions:  Take 0.5 tablets (10 mg total) by mouth once daily.     Begin Date    AM    Noon    PM    Bedtime       magnesium oxide 400 mg tablet   Commonly known as:  MAG-OX   Refills:  0   Dose:  400 mg    Last time this was given:  400 mg on 4/28/2017  9:23 AM   Instructions:  Take 1 tablet (400 mg total) by mouth 2 (two) times daily.     Begin Date    AM    Noon    PM    Bedtime       metformin 500 MG tablet   Commonly known as:  GLUCOPHAGE   Quantity:  180 tablet   Refills:  3    Instructions:  TAKE 1 TABLET TWICE DAILY WITH MEALS     Begin Date    AM    Noon    PM    Bedtime       methocarbamol 750 MG Tab   Commonly known as:  ROBAXIN   Quantity:  360 tablet   Refills:  1    Last time this was given:  750 mg on 4/28/2017 12:51 PM   Instructions:  TAKE 1 TABLET FOUR TIMES DAILY     Begin Date    AM    Noon    PM    Bedtime       metoprolol succinate 25 MG 24 hr tablet   Commonly known as:  TOPROL-XL   Refills:  0   Dose:  12.5 mg   Indications:  Hold  for heart rate less than 60 or DBP < 60    Last time this was given:  12.5 mg on 4/28/2017  9:23 AM   Instructions:  Take 0.5 tablets (12.5 mg total) by mouth once daily. On hold waiting for dr visit     Begin Date    AM    Noon    PM    Bedtime       miconazole NITRATE 2 % 2 % top powder   Commonly known as:  MICOTIN   Refills:  0    Last time this was given:  4/28/2017  9:23 AM   Instructions:  Apply topically 2 (two) times daily. Skin folds     Begin Date    AM    Noon    PM    Bedtime       multivit, Ca, min-FA-soy isofl 400-60 mcg-mg Tab   Refills:  0   Dose:  1 tablet    Instructions:  Take 1 tablet by mouth once daily.     Begin Date    AM    Noon    PM    Bedtime       multivitamin tablet   Commonly known as:  THERAGRAN   Refills:  0   Dose:  1 tablet    Instructions:  Take 1 tablet by mouth once daily.     Begin Date    AM    Noon    PM    Bedtime       polyethylene glycol 17 gram/dose powder   Commonly  known as:  GLYCOLAX   Quantity:  1530 g   Refills:  11    Instructions:  MIX 17 GRAMS IN LIQUID AND DRINK EVERY DAY AS NEEDED     Begin Date    AM    Noon    PM    Bedtime       potassium chloride SA 20 MEQ tablet   Commonly known as:  K-DUR,KLOR-CON   Quantity:  30 tablet   Refills:  1   Dose:  20 mEq    Last time this was given:  20 mEq on 4/28/2017  9:22 AM   Instructions:  Take 1 tablet (20 mEq total) by mouth once daily.     Begin Date    AM    Noon    PM    Bedtime       SPIRIVA WITH HANDIHALER 18 mcg inhalation capsule   Quantity:  90 capsule   Refills:  1   Generic drug:  tiotropium    Instructions:  INHALE THE CONTENTS OF 1 CAPSULE EVERY DAY     Begin Date    AM    Noon    PM    Bedtime       temazepam 30 mg capsule   Commonly known as:  RESTORIL   Quantity:  30 capsule   Refills:  1   Dose:  30 mg    Last time this was given:  30 mg on 4/27/2017 10:12 PM   Instructions:  Take 1 capsule (30 mg total) by mouth nightly as needed.     Begin Date    AM    Noon    PM    Bedtime       warfarin 5 MG tablet   Commonly known as:  COUMADIN   Quantity:  90 tablet   Refills:  1   Dose:  2.5 mg    Last time this was given:  5 mg on 4/27/2017  5:12 PM   Instructions:  Take 0.5 tablets (2.5 mg total) by mouth Daily.     Begin Date    AM    Noon    PM    Bedtime       * Notice:  This list has 2 medication(s) that are the same as other medications prescribed for you. Read the directions carefully, and ask your doctor or other care provider to review them with you.      STOP taking these medications     fluconazole 100 MG tablet   Commonly known as:  DIFLUCAN       MILK OF MAGNESIA 400 mg/5 mL Susp   Generic drug:  magnesium hydroxide 400 mg/5 ml       zinc sulfate 220 (50) mg capsule   Commonly known as:  ZINCATE            Where to Get Your Medications      These medications were sent to [facility] [facility] [address] [place] - [address]  [place] [address]     Phone:  [phone]      cephALEXin 500 MG capsule                  Please bring to all follow up appointments:    1. A copy of your discharge instructions.  2. All medicines you are currently taking in their original bottles.  3. Identification and insurance card.    Please arrive 15 minutes ahead of scheduled appointment time.    Please call 24 hours in advance if you must reschedule your appointment and/or time.        Your Scheduled Appointments     May 08, 2017  2:00 PM CDT   Established Patient Visit with Constantine Bird MD   [facility] ([facility] [place])    [address]  [facility] [address]   [phone]            Jun 05, 2017  9:20 AM CDT   Non-Fasting Lab with [facility] ([facility] [place] [facility]) [address] [place] [address]   [phone]            Jun 07, 2017  2:20 PM CDT   Established Patient Visit with Constantine Bird MD   [facility] ([facility] [place])    [address]  [facility] [address]   [phone]            Jun 09, 2017  1:20 PM CDT   Established Patient Visit with Laura Ramos MD   [facility] [facility] ([facility] [place] [facility] - [facility])    [address], [address]  [facility] [id number] [phone]            Jun 23, 2017  1:30 PM CDT   Established Patient Visit with Chu Mack MD   [facility] - Cardiology ([facility] [place] [facility])    [address]  [facility] [address] [phone]              Follow-up Information     Follow up with [facility].    Specialty:  Home Health Services    Why:  Home Health    Contact information:    [address]  [address]  [place] [address] [phone]          Follow up with Constantine Bird MD In 1 week.    Specialty:  Family Medicine    Why:  will need new Rx for keflex once 10 day course is over.    Contact information:    [address]  [place] [address]  [phone]        Referrals     Future Orders     Ambulatory Referral to Wound Clinic     Questions:    Diabetic Wound of the Lower Extremity?:      Venous Stasis with Ulcer?:      Cellulitis/Abcess:      Osteomyelitis:      Pressure Ulcer:      Open Wound of Lower Extremity:      Surgical Wound Non-healing:      Open Wound:      Compromised graft-skin graft/flap:      Hyperbarics:      TCOM Only (Transcutaneous O2 Measures):      Other:      Referral to Home health         Discharge Instructions     Future Orders    Activity as tolerated     Diet Diabetic 2000 Calories         Discharge Instructions       Thank you for choosing [facility] for your medical care. The primary doctor who is taking care of you at the time of your discharge is Saba Mora MD.     You were admitted to the hospital with Cellulitis, abdominal wall.     Please note your discharge instructions, including diet/activity restrictions, follow-up appointments, and medication changes.  If you have any questions about your medical issues, prescriptions, or any other questions, please feel free to contact the [facility] Medicine Dept at [phone] and we will help.    If you are previously with Home health, outpatient PT/OT or under a therapy program, you are cleared to return to those programs.    Please direct all long term medication refills and follow up to your primary care provider, Constantine iBrd MD. Thank you again for letting us take care of your health care needs.    Please note the following discharge instructions per your discharging physician-  Take all medications as prescribed.        Primary Diagnosis     Your primary diagnosis was:  Cellulitis Of Abdominal Wall      Admission Information     Date & Time Provider Department CSN    4/22/2017 11:56 PM Saba Mora MD [facility] [id number]      Care Providers     Provider Role Specialty Primary office phone    Saba Mora MD Attending Provider Family Medicine [phone]  "   David Parks MD Consulting Physician  Anesthesiology [phone]    Opal Jacobsen MD Consulting Physician  Infectious Diseases [phone]      Important Medicare Message          Most Recent Value    Important Message from Medicare Regarding Discharge Appeal Rights  Signed/date by patient/caregiver, Explained to patient/caregiver yes 04/27/2017 1130      Your Vitals Were     BP Pulse Temp Resp Height Weight    121/84 47 97.4 °F (36.3 °C) 16 5' 8" (1.727 m) 149.4 kg (329 lb 5.9 oz)    SpO2 BMI             92% 50.08 kg/m2         Recent Lab Values        7/3/2014 10/28/2014 9/10/2015 3/22/2016 10/20/2016 10/26/2016 2/14/2017 4/5/2017      8:31 AM  8:00 AM 10:02 AM  7:46 AM 11:45 AM 11:11 AM  3:22 PM  3:00 PM    A1C 5.5 5.6 5.6 5.1 6.2 6.3 (H) 6.1 6.5 (H)    Comment for A1C at 11:45 AM on 10/20/2016:  According to ADA guidelines, hemoglobin A1C <7.0% represents  optimal control in non-pregnant diabetic patients.  Different  metrics may apply to specific populations.   Standards of Medical Care in Diabetes - 2016.  For the purpose of screening for the presence of diabetes:  <5.7%     Consistent with the absence of diabetes  5.7-6.4%  Consistent with increasing risk for diabetes   (prediabetes)  >or=6.5%  Consistent with diabetes  Currently no consensus exists for use of hemoglobin A1C  for diagnosis of diabetes for children.      Comment for A1C at 11:11 AM on 10/26/2016:  According to ADA guidelines, hemoglobin A1C <7.0% represents  optimal control in non-pregnant diabetic patients.  Different  metrics may apply to specific populations.   Standards of Medical Care in Diabetes - 2016.  For the purpose of screening for the presence of diabetes:  <5.7%     Consistent with the absence of diabetes  5.7-6.4%  Consistent with increasing risk for diabetes   (prediabetes)  >or=6.5%  Consistent with diabetes  Currently no consensus exists for use of hemoglobin A1C  for diagnosis of diabetes for children.      Comment " for A1C at  3:22 PM on 2/14/2017:  According to ADA guidelines, hemoglobin A1C <7.0% represents  optimal control in non-pregnant diabetic patients.  Different  metrics may apply to specific populations.   Standards of Medical Care in Diabetes - 2016.  For the purpose of screening for the presence of diabetes:  <5.7%     Consistent with the absence of diabetes  5.7-6.4%  Consistent with increasing risk for diabetes   (prediabetes)  >or=6.5%  Consistent with diabetes  Currently no consensus exists for use of hemoglobin A1C  for diagnosis of diabetes for children.      Comment for A1C at  3:00 PM on 4/5/2017:  According to ADA guidelines, hemoglobin A1C <7.0% represents  optimal control in non-pregnant diabetic patients.  Different  metrics may apply to specific populations.   Standards of Medical Care in Diabetes - 2016.  For the purpose of screening for the presence of diabetes:  <5.7%     Consistent with the absence of diabetes  5.7-6.4%  Consistent with increasing risk for diabetes   (prediabetes)  >or=6.5%  Consistent with diabetes  Currently no consensus exists for use of hemoglobin A1C  for diagnosis of diabetes for children.        Allergies as of 4/28/2017        Reactions    Dilaudid [Hydromorphone] Anaphylaxis    Other reaction(s): Anaphylaxis  Other reaction(s): Unknown      [facility] On Call     [facility] On Call Nurse Care Line - 24/7 Assistance  Unless otherwise directed by your provider, please contact [facility], our nurse care line that is available for 24/7 assistance.     Registered nurses in the [facility] provide clinical advisement, health education, appointment booking, and other advisory services.  Call for this free service at [phone].        Advance Directives     An advance directive is a document which, in the event you are no longer able to make decisions for yourself, tells your healthcare team what kind of treatment you do or do not want to receive, or who you would  like to make those decisions for you.  If you do not currently have an advance directive, [facility] encourages you to create one.  For more information call:  [phone]), [phone] ([phone]),  or log on to [email or web address].        Smoking Cessation     If you would like to quit smoking:   You may be eligible for free services if you are a [place] resident and started smoking cigarettes before September 1, 1988.  Call the Smoking Cessation Trust (SCT) toll free at (911) 738-0368 or (361) 947-6261.   Call 6-508-QUIT-NOW if you do not meet the above criteria.   Contact us via email: [email or web address]   View our website for more information: [email or web address]        Language Assistance Services     ATTENTION: Language assistance services are available, free of charge. Please call [phone].      ATENCIÓN: Si habla español, tiene a dimas disposición servicios gratuitos de asistencia lingüística. Llame al [phone].     CHÚ Ý: N?u b?n nói Ti?ng Vi?t, có các d?ch v? h? tr? ngôn ng? mi?n phí dành cho b?n. G?i s? [phone].        Pneumonmia Discharge Instructions                Coumadin Discharge Instructions                         Diabetes Discharge Instructions                                    [facility] complies with applicable Federal civil rights laws and does not discriminate on the basis of race, color, national origin, age, disability, or sex.

## 2017-04-23 ENCOUNTER — ANESTHESIA (OUTPATIENT)
Dept: INTENSIVE CARE | Facility: HOSPITAL | Age: 67
DRG: 872 | End: 2017-04-23
Payer: MEDICARE

## 2017-04-23 ENCOUNTER — ANESTHESIA EVENT (OUTPATIENT)
Dept: INTENSIVE CARE | Facility: HOSPITAL | Age: 67
DRG: 872 | End: 2017-04-23
Payer: MEDICARE

## 2017-04-23 PROBLEM — E83.42 HYPOMAGNESEMIA: Status: RESOLVED | Noted: 2017-04-05 | Resolved: 2017-04-23

## 2017-04-23 PROBLEM — E87.5 HYPERKALEMIA: Status: ACTIVE | Noted: 2017-04-23

## 2017-04-23 PROBLEM — E87.6 HYPOKALEMIA: Status: RESOLVED | Noted: 2017-04-06 | Resolved: 2017-04-23

## 2017-04-23 PROBLEM — R65.21 SEPTIC SHOCK: Status: ACTIVE | Noted: 2017-04-23

## 2017-04-23 PROBLEM — I50.33 ACUTE ON CHRONIC DIASTOLIC CONGESTIVE HEART FAILURE: Status: RESOLVED | Noted: 2017-02-14 | Resolved: 2017-04-23

## 2017-04-23 PROBLEM — J96.10 CHRONIC RESPIRATORY FAILURE: Status: ACTIVE | Noted: 2017-04-23

## 2017-04-23 PROBLEM — A41.9 SEPTIC SHOCK: Status: ACTIVE | Noted: 2017-04-23

## 2017-04-23 PROBLEM — I48.91 ATRIAL FIBRILLATION, RAPID: Status: ACTIVE | Noted: 2017-04-23

## 2017-04-23 LAB
ALBUMIN SERPL BCP-MCNC: 3.2 G/DL
ALBUMIN SERPL BCP-MCNC: 3.6 G/DL
ALP SERPL-CCNC: 100 U/L
ALP SERPL-CCNC: 98 U/L
ALT SERPL W/O P-5'-P-CCNC: 18 U/L
ALT SERPL W/O P-5'-P-CCNC: 25 U/L
ANION GAP SERPL CALC-SCNC: 14 MMOL/L
ANION GAP SERPL CALC-SCNC: 16 MMOL/L
ANISOCYTOSIS BLD QL SMEAR: SLIGHT
ANISOCYTOSIS BLD QL SMEAR: SLIGHT
AST SERPL-CCNC: 32 U/L
AST SERPL-CCNC: 42 U/L
BASOPHILS # BLD AUTO: ABNORMAL K/UL
BASOPHILS # BLD AUTO: ABNORMAL K/UL
BASOPHILS NFR BLD: 0 %
BASOPHILS NFR BLD: 0 %
BILIRUB SERPL-MCNC: 0.3 MG/DL
BILIRUB SERPL-MCNC: 0.5 MG/DL
BILIRUB UR QL STRIP: NEGATIVE
BILIRUB UR QL STRIP: NEGATIVE
BUN SERPL-MCNC: 24 MG/DL
BUN SERPL-MCNC: 30 MG/DL
CALCIUM SERPL-MCNC: 10.3 MG/DL
CALCIUM SERPL-MCNC: 9.9 MG/DL
CHLORIDE SERPL-SCNC: 101 MMOL/L
CHLORIDE SERPL-SCNC: 99 MMOL/L
CLARITY UR: CLEAR
CLARITY UR: CLEAR
CO2 SERPL-SCNC: 24 MMOL/L
CO2 SERPL-SCNC: 25 MMOL/L
COLOR UR: YELLOW
COLOR UR: YELLOW
CREAT SERPL-MCNC: 0.9 MG/DL
CREAT SERPL-MCNC: 1 MG/DL
DIFFERENTIAL METHOD: ABNORMAL
DIFFERENTIAL METHOD: ABNORMAL
EOSINOPHIL # BLD AUTO: ABNORMAL K/UL
EOSINOPHIL # BLD AUTO: ABNORMAL K/UL
EOSINOPHIL NFR BLD: 0 %
EOSINOPHIL NFR BLD: 3 %
ERYTHROCYTE [DISTWIDTH] IN BLOOD BY AUTOMATED COUNT: 17.6 %
ERYTHROCYTE [DISTWIDTH] IN BLOOD BY AUTOMATED COUNT: 18.3 %
EST. GFR  (AFRICAN AMERICAN): >60 ML/MIN/1.73 M^2
EST. GFR  (AFRICAN AMERICAN): >60 ML/MIN/1.73 M^2
EST. GFR  (NON AFRICAN AMERICAN): 59 ML/MIN/1.73 M^2
EST. GFR  (NON AFRICAN AMERICAN): >60 ML/MIN/1.73 M^2
GLUCOSE SERPL-MCNC: 141 MG/DL
GLUCOSE SERPL-MCNC: 209 MG/DL
GLUCOSE UR QL STRIP: NEGATIVE
GLUCOSE UR QL STRIP: NEGATIVE
HCT VFR BLD AUTO: 36.6 %
HCT VFR BLD AUTO: 40.6 %
HGB BLD-MCNC: 11.7 G/DL
HGB BLD-MCNC: 12.9 G/DL
HGB UR QL STRIP: ABNORMAL
HGB UR QL STRIP: ABNORMAL
INR PPP: 3.9
INR PPP: 4.2
KETONES UR QL STRIP: NEGATIVE
KETONES UR QL STRIP: NEGATIVE
LACTATE SERPL-SCNC: 1.8 MMOL/L
LACTATE SERPL-SCNC: 1.8 MMOL/L
LACTATE SERPL-SCNC: 3.1 MMOL/L
LEUKOCYTE ESTERASE UR QL STRIP: NEGATIVE
LEUKOCYTE ESTERASE UR QL STRIP: NEGATIVE
LYMPHOCYTES # BLD AUTO: ABNORMAL K/UL
LYMPHOCYTES # BLD AUTO: ABNORMAL K/UL
LYMPHOCYTES NFR BLD: 3 %
LYMPHOCYTES NFR BLD: 5 %
MAGNESIUM SERPL-MCNC: 1.4 MG/DL
MCH RBC QN AUTO: 25.5 PG
MCH RBC QN AUTO: 25.7 PG
MCHC RBC AUTO-ENTMCNC: 31.7 %
MCHC RBC AUTO-ENTMCNC: 32 %
MCV RBC AUTO: 80 FL
MCV RBC AUTO: 81 FL
MONOCYTES # BLD AUTO: ABNORMAL K/UL
MONOCYTES # BLD AUTO: ABNORMAL K/UL
MONOCYTES NFR BLD: 4 %
MONOCYTES NFR BLD: 7 %
NEUTROPHILS NFR BLD: 82 %
NEUTROPHILS NFR BLD: 84 %
NEUTS BAND NFR BLD MANUAL: 3 %
NEUTS BAND NFR BLD MANUAL: 9 %
NITRITE UR QL STRIP: NEGATIVE
NITRITE UR QL STRIP: NEGATIVE
OVALOCYTES BLD QL SMEAR: ABNORMAL
OVALOCYTES BLD QL SMEAR: ABNORMAL
PH UR STRIP: 5 [PH] (ref 5–8)
PH UR STRIP: 6 [PH] (ref 5–8)
PHOSPHATE SERPL-MCNC: 1.3 MG/DL
PLATELET # BLD AUTO: 267 K/UL
PLATELET # BLD AUTO: 321 K/UL
PLATELET BLD QL SMEAR: ABNORMAL
PLATELET BLD QL SMEAR: ABNORMAL
PMV BLD AUTO: 8.6 FL
PMV BLD AUTO: 8.9 FL
POCT GLUCOSE: 136 MG/DL (ref 70–110)
POCT GLUCOSE: 158 MG/DL (ref 70–110)
POCT GLUCOSE: 217 MG/DL (ref 70–110)
POIKILOCYTOSIS BLD QL SMEAR: SLIGHT
POIKILOCYTOSIS BLD QL SMEAR: SLIGHT
POLYCHROMASIA BLD QL SMEAR: ABNORMAL
POLYCHROMASIA BLD QL SMEAR: ABNORMAL
POTASSIUM SERPL-SCNC: 4.2 MMOL/L
POTASSIUM SERPL-SCNC: 5.7 MMOL/L
PROT SERPL-MCNC: 7.4 G/DL
PROT SERPL-MCNC: 8.8 G/DL
PROT UR QL STRIP: NEGATIVE
PROT UR QL STRIP: NEGATIVE
PROTHROMBIN TIME: 38.8 SEC
PROTHROMBIN TIME: 41.5 SEC
RBC # BLD AUTO: 4.56 M/UL
RBC # BLD AUTO: 5.04 M/UL
SODIUM SERPL-SCNC: 139 MMOL/L
SODIUM SERPL-SCNC: 140 MMOL/L
SP GR UR STRIP: 1.01 (ref 1–1.03)
SP GR UR STRIP: 1.01 (ref 1–1.03)
TROPONIN I SERPL DL<=0.01 NG/ML-MCNC: <0.006 NG/ML
TSH SERPL DL<=0.005 MIU/L-ACNC: 1.34 UIU/ML
URN SPEC COLLECT METH UR: ABNORMAL
URN SPEC COLLECT METH UR: ABNORMAL
UROBILINOGEN UR STRIP-ACNC: NEGATIVE EU/DL
UROBILINOGEN UR STRIP-ACNC: NEGATIVE EU/DL
WBC # BLD AUTO: 18.5 K/UL
WBC # BLD AUTO: 23.5 K/UL

## 2017-04-23 PROCEDURE — 20000000 HC ICU ROOM

## 2017-04-23 PROCEDURE — 94640 AIRWAY INHALATION TREATMENT: CPT

## 2017-04-23 PROCEDURE — 84443 ASSAY THYROID STIM HORMONE: CPT

## 2017-04-23 PROCEDURE — 87086 URINE CULTURE/COLONY COUNT: CPT

## 2017-04-23 PROCEDURE — 27000221 HC OXYGEN, UP TO 24 HOURS

## 2017-04-23 PROCEDURE — 25000003 PHARM REV CODE 250: Performed by: EMERGENCY MEDICINE

## 2017-04-23 PROCEDURE — 36415 COLL VENOUS BLD VENIPUNCTURE: CPT

## 2017-04-23 PROCEDURE — 99223 1ST HOSP IP/OBS HIGH 75: CPT | Mod: ,,, | Performed by: SURGERY

## 2017-04-23 PROCEDURE — 84484 ASSAY OF TROPONIN QUANT: CPT

## 2017-04-23 PROCEDURE — 63600175 PHARM REV CODE 636 W HCPCS: Performed by: EMERGENCY MEDICINE

## 2017-04-23 PROCEDURE — 25000003 PHARM REV CODE 250: Performed by: PHYSICIAN ASSISTANT

## 2017-04-23 PROCEDURE — 63600175 PHARM REV CODE 636 W HCPCS: Performed by: PHYSICIAN ASSISTANT

## 2017-04-23 PROCEDURE — 63600175 PHARM REV CODE 636 W HCPCS

## 2017-04-23 PROCEDURE — 93010 ELECTROCARDIOGRAM REPORT: CPT | Mod: ,,, | Performed by: INTERNAL MEDICINE

## 2017-04-23 PROCEDURE — 84100 ASSAY OF PHOSPHORUS: CPT

## 2017-04-23 PROCEDURE — 36556 INSERT NON-TUNNEL CV CATH: CPT

## 2017-04-23 PROCEDURE — 80053 COMPREHEN METABOLIC PANEL: CPT | Mod: 91

## 2017-04-23 PROCEDURE — 25000003 PHARM REV CODE 250: Performed by: INTERNAL MEDICINE

## 2017-04-23 PROCEDURE — 83735 ASSAY OF MAGNESIUM: CPT

## 2017-04-23 PROCEDURE — 81003 URINALYSIS AUTO W/O SCOPE: CPT

## 2017-04-23 PROCEDURE — 76937 US GUIDE VASCULAR ACCESS: CPT | Performed by: ANESTHESIOLOGY

## 2017-04-23 PROCEDURE — 85610 PROTHROMBIN TIME: CPT

## 2017-04-23 PROCEDURE — 63600175 PHARM REV CODE 636 W HCPCS: Performed by: INTERNAL MEDICINE

## 2017-04-23 PROCEDURE — 36556 INSERT NON-TUNNEL CV CATH: CPT | Mod: ,,, | Performed by: ANESTHESIOLOGY

## 2017-04-23 PROCEDURE — 83605 ASSAY OF LACTIC ACID: CPT | Mod: 91

## 2017-04-23 PROCEDURE — 93005 ELECTROCARDIOGRAM TRACING: CPT

## 2017-04-23 PROCEDURE — 85027 COMPLETE CBC AUTOMATED: CPT

## 2017-04-23 PROCEDURE — 94761 N-INVAS EAR/PLS OXIMETRY MLT: CPT

## 2017-04-23 PROCEDURE — 81003 URINALYSIS AUTO W/O SCOPE: CPT | Mod: 91

## 2017-04-23 PROCEDURE — 87040 BLOOD CULTURE FOR BACTERIA: CPT

## 2017-04-23 PROCEDURE — 85610 PROTHROMBIN TIME: CPT | Mod: 91

## 2017-04-23 PROCEDURE — 80053 COMPREHEN METABOLIC PANEL: CPT

## 2017-04-23 PROCEDURE — 85007 BL SMEAR W/DIFF WBC COUNT: CPT | Mod: 91

## 2017-04-23 RX ORDER — IBUPROFEN 200 MG
16 TABLET ORAL
Status: DISCONTINUED | OUTPATIENT
Start: 2017-04-23 | End: 2017-04-28 | Stop reason: HOSPADM

## 2017-04-23 RX ORDER — AMOXICILLIN 250 MG
1 CAPSULE ORAL 2 TIMES DAILY PRN
Status: DISCONTINUED | OUTPATIENT
Start: 2017-04-23 | End: 2017-04-28 | Stop reason: HOSPADM

## 2017-04-23 RX ORDER — WARFARIN 2.5 MG/1
2.5 TABLET ORAL DAILY
Status: DISCONTINUED | OUTPATIENT
Start: 2017-04-23 | End: 2017-04-23

## 2017-04-23 RX ORDER — MAGNESIUM SULFATE HEPTAHYDRATE 40 MG/ML
4 INJECTION, SOLUTION INTRAVENOUS
Status: DISCONTINUED | OUTPATIENT
Start: 2017-04-23 | End: 2017-04-28 | Stop reason: HOSPADM

## 2017-04-23 RX ORDER — ZINC SULFATE 50(220)MG
220 CAPSULE ORAL DAILY
Status: DISCONTINUED | OUTPATIENT
Start: 2017-04-23 | End: 2017-04-28 | Stop reason: HOSPADM

## 2017-04-23 RX ORDER — GABAPENTIN 300 MG/1
600 CAPSULE ORAL 3 TIMES DAILY
Status: DISCONTINUED | OUTPATIENT
Start: 2017-04-23 | End: 2017-04-28 | Stop reason: HOSPADM

## 2017-04-23 RX ORDER — METOPROLOL TARTRATE 1 MG/ML
10 INJECTION, SOLUTION INTRAVENOUS ONCE
Status: COMPLETED | OUTPATIENT
Start: 2017-04-23 | End: 2017-04-23

## 2017-04-23 RX ORDER — POTASSIUM CHLORIDE 20 MEQ/15ML
40 SOLUTION ORAL
Status: DISCONTINUED | OUTPATIENT
Start: 2017-04-23 | End: 2017-04-28 | Stop reason: HOSPADM

## 2017-04-23 RX ORDER — INSULIN ASPART 100 [IU]/ML
0-5 INJECTION, SOLUTION INTRAVENOUS; SUBCUTANEOUS
Status: DISCONTINUED | OUTPATIENT
Start: 2017-04-23 | End: 2017-04-28 | Stop reason: HOSPADM

## 2017-04-23 RX ORDER — ATORVASTATIN CALCIUM 20 MG/1
20 TABLET, FILM COATED ORAL DAILY
Status: DISCONTINUED | OUTPATIENT
Start: 2017-04-23 | End: 2017-04-28 | Stop reason: HOSPADM

## 2017-04-23 RX ORDER — ESCITALOPRAM OXALATE 10 MG/1
20 TABLET ORAL DAILY
Status: DISCONTINUED | OUTPATIENT
Start: 2017-04-23 | End: 2017-04-28 | Stop reason: HOSPADM

## 2017-04-23 RX ORDER — MAGNESIUM SULFATE HEPTAHYDRATE 40 MG/ML
2 INJECTION, SOLUTION INTRAVENOUS
Status: DISCONTINUED | OUTPATIENT
Start: 2017-04-23 | End: 2017-04-28 | Stop reason: HOSPADM

## 2017-04-23 RX ORDER — MIDAZOLAM HYDROCHLORIDE 1 MG/ML
INJECTION INTRAMUSCULAR; INTRAVENOUS
Status: COMPLETED
Start: 2017-04-23 | End: 2017-04-23

## 2017-04-23 RX ORDER — OXYCODONE HCL 10 MG/1
10 TABLET, FILM COATED, EXTENDED RELEASE ORAL ONCE
Status: COMPLETED | OUTPATIENT
Start: 2017-04-23 | End: 2017-04-23

## 2017-04-23 RX ORDER — POTASSIUM CHLORIDE 20 MEQ/1
20 TABLET, EXTENDED RELEASE ORAL DAILY
Status: DISCONTINUED | OUTPATIENT
Start: 2017-04-23 | End: 2017-04-28 | Stop reason: HOSPADM

## 2017-04-23 RX ORDER — FUROSEMIDE 40 MG/1
40 TABLET ORAL 2 TIMES DAILY
Status: DISCONTINUED | OUTPATIENT
Start: 2017-04-23 | End: 2017-04-23

## 2017-04-23 RX ORDER — INDOMETHACIN 25 MG/1
50 CAPSULE ORAL
Status: COMPLETED | OUTPATIENT
Start: 2017-04-23 | End: 2017-04-23

## 2017-04-23 RX ORDER — NOREPINEPHRINE BITARTRATE/D5W 8 MG/250ML
0.02 PLASTIC BAG, INJECTION (ML) INTRAVENOUS CONTINUOUS
Status: DISCONTINUED | OUTPATIENT
Start: 2017-04-23 | End: 2017-04-24

## 2017-04-23 RX ORDER — LANOLIN ALCOHOL/MO/W.PET/CERES
800 CREAM (GRAM) TOPICAL
Status: DISCONTINUED | OUTPATIENT
Start: 2017-04-23 | End: 2017-04-23

## 2017-04-23 RX ORDER — PROCHLORPERAZINE EDISYLATE 5 MG/ML
5 INJECTION INTRAMUSCULAR; INTRAVENOUS
Status: COMPLETED | OUTPATIENT
Start: 2017-04-23 | End: 2017-04-23

## 2017-04-23 RX ORDER — LORAZEPAM 2 MG/ML
1 INJECTION INTRAMUSCULAR
Status: COMPLETED | OUTPATIENT
Start: 2017-04-23 | End: 2017-04-23

## 2017-04-23 RX ORDER — ACETAMINOPHEN 325 MG/1
650 TABLET ORAL EVERY 6 HOURS PRN
Status: DISCONTINUED | OUTPATIENT
Start: 2017-04-23 | End: 2017-04-28 | Stop reason: HOSPADM

## 2017-04-23 RX ORDER — CLONAZEPAM 1 MG/1
1 TABLET ORAL 2 TIMES DAILY PRN
Status: DISCONTINUED | OUTPATIENT
Start: 2017-04-23 | End: 2017-04-28 | Stop reason: HOSPADM

## 2017-04-23 RX ORDER — LINEZOLID 2 MG/ML
600 INJECTION, SOLUTION INTRAVENOUS ONCE
Status: DISCONTINUED | OUTPATIENT
Start: 2017-04-23 | End: 2017-04-23

## 2017-04-23 RX ORDER — HYDROCODONE BITARTRATE AND ACETAMINOPHEN 10; 325 MG/1; MG/1
1 TABLET ORAL EVERY 6 HOURS PRN
Status: DISCONTINUED | OUTPATIENT
Start: 2017-04-23 | End: 2017-04-28 | Stop reason: HOSPADM

## 2017-04-23 RX ORDER — IBUPROFEN 200 MG
24 TABLET ORAL
Status: DISCONTINUED | OUTPATIENT
Start: 2017-04-23 | End: 2017-04-28 | Stop reason: HOSPADM

## 2017-04-23 RX ORDER — LINEZOLID 2 MG/ML
600 INJECTION, SOLUTION INTRAVENOUS
Status: DISCONTINUED | OUTPATIENT
Start: 2017-04-23 | End: 2017-04-26

## 2017-04-23 RX ORDER — GLUCAGON 1 MG
1 KIT INJECTION
Status: DISCONTINUED | OUTPATIENT
Start: 2017-04-23 | End: 2017-04-28 | Stop reason: HOSPADM

## 2017-04-23 RX ORDER — FLUCONAZOLE 100 MG/1
100 TABLET ORAL DAILY
Status: DISCONTINUED | OUTPATIENT
Start: 2017-04-23 | End: 2017-04-28 | Stop reason: HOSPADM

## 2017-04-23 RX ORDER — POTASSIUM CHLORIDE 20 MEQ/15ML
60 SOLUTION ORAL
Status: DISCONTINUED | OUTPATIENT
Start: 2017-04-23 | End: 2017-04-28 | Stop reason: HOSPADM

## 2017-04-23 RX ORDER — MORPHINE SULFATE 2 MG/ML
2 INJECTION, SOLUTION INTRAMUSCULAR; INTRAVENOUS ONCE
Status: DISCONTINUED | OUTPATIENT
Start: 2017-04-23 | End: 2017-04-23

## 2017-04-23 RX ORDER — SODIUM CHLORIDE 9 MG/ML
INJECTION, SOLUTION INTRAVENOUS CONTINUOUS
Status: DISCONTINUED | OUTPATIENT
Start: 2017-04-23 | End: 2017-04-25

## 2017-04-23 RX ORDER — IPRATROPIUM BROMIDE 0.5 MG/2.5ML
0.5 SOLUTION RESPIRATORY (INHALATION)
Status: DISCONTINUED | OUTPATIENT
Start: 2017-04-23 | End: 2017-04-28 | Stop reason: HOSPADM

## 2017-04-23 RX ORDER — LANOLIN ALCOHOL/MO/W.PET/CERES
400 CREAM (GRAM) TOPICAL 2 TIMES DAILY
Status: DISCONTINUED | OUTPATIENT
Start: 2017-04-23 | End: 2017-04-28 | Stop reason: HOSPADM

## 2017-04-23 RX ORDER — LISINOPRIL 10 MG/1
10 TABLET ORAL DAILY
Status: DISCONTINUED | OUTPATIENT
Start: 2017-04-23 | End: 2017-04-23

## 2017-04-23 RX ORDER — ONDANSETRON 2 MG/ML
8 INJECTION INTRAMUSCULAR; INTRAVENOUS EVERY 8 HOURS PRN
Status: DISCONTINUED | OUTPATIENT
Start: 2017-04-23 | End: 2017-04-28 | Stop reason: HOSPADM

## 2017-04-23 RX ORDER — LEVALBUTEROL 1.25 MG/.5ML
1.25 SOLUTION, CONCENTRATE RESPIRATORY (INHALATION)
Status: DISCONTINUED | OUTPATIENT
Start: 2017-04-23 | End: 2017-04-24

## 2017-04-23 RX ORDER — DEXTROSE 50 % IN WATER (D50W) INTRAVENOUS SYRINGE
25
Status: COMPLETED | OUTPATIENT
Start: 2017-04-23 | End: 2017-04-23

## 2017-04-23 RX ORDER — METHOCARBAMOL 750 MG/1
750 TABLET, FILM COATED ORAL 4 TIMES DAILY
Status: DISCONTINUED | OUTPATIENT
Start: 2017-04-23 | End: 2017-04-28 | Stop reason: HOSPADM

## 2017-04-23 RX ADMIN — POTASSIUM CHLORIDE 20 MEQ: 1500 TABLET, EXTENDED RELEASE ORAL at 12:04

## 2017-04-23 RX ADMIN — ESCITALOPRAM 20 MG: 10 TABLET, FILM COATED ORAL at 12:04

## 2017-04-23 RX ADMIN — PIPERACILLIN SODIUM AND TAZOBACTAM SODIUM 4.5 G: 4; .5 INJECTION, POWDER, LYOPHILIZED, FOR SOLUTION INTRAVENOUS at 05:04

## 2017-04-23 RX ADMIN — FLUCONAZOLE 100 MG: 100 TABLET ORAL at 12:04

## 2017-04-23 RX ADMIN — SODIUM CHLORIDE: 0.9 INJECTION, SOLUTION INTRAVENOUS at 11:04

## 2017-04-23 RX ADMIN — GABAPENTIN 600 MG: 300 CAPSULE ORAL at 10:04

## 2017-04-23 RX ADMIN — ACETAMINOPHEN 650 MG: 325 TABLET, FILM COATED ORAL at 03:04

## 2017-04-23 RX ADMIN — SODIUM PHOSPHATE, MONOBASIC, MONOHYDRATE 30 MMOL: 276; 142 INJECTION, SOLUTION INTRAVENOUS at 06:04

## 2017-04-23 RX ADMIN — SODIUM BICARBONATE 50 MEQ: 84 INJECTION, SOLUTION INTRAVENOUS at 01:04

## 2017-04-23 RX ADMIN — SODIUM CHLORIDE 1000 ML: 0.9 INJECTION, SOLUTION INTRAVENOUS at 08:04

## 2017-04-23 RX ADMIN — ZINC SULFATE 220 MG (50 MG) CAPSULE 220 MG: CAPSULE at 12:04

## 2017-04-23 RX ADMIN — IPRATROPIUM BROMIDE 0.5 MG: 0.5 SOLUTION RESPIRATORY (INHALATION) at 02:04

## 2017-04-23 RX ADMIN — MAGNESIUM SULFATE IN WATER 2 G: 40 INJECTION, SOLUTION INTRAVENOUS at 03:04

## 2017-04-23 RX ADMIN — METHOCARBAMOL 750 MG: 750 TABLET ORAL at 10:04

## 2017-04-23 RX ADMIN — METOPROLOL TARTRATE 10 MG: 5 INJECTION, SOLUTION INTRAVENOUS at 01:04

## 2017-04-23 RX ADMIN — Medication 400 MG: at 09:04

## 2017-04-23 RX ADMIN — ATORVASTATIN CALCIUM 20 MG: 20 TABLET, FILM COATED ORAL at 12:04

## 2017-04-23 RX ADMIN — IPRATROPIUM BROMIDE 0.5 MG: 0.5 SOLUTION RESPIRATORY (INHALATION) at 07:04

## 2017-04-23 RX ADMIN — LORAZEPAM 1 MG: 2 INJECTION INTRAMUSCULAR; INTRAVENOUS at 12:04

## 2017-04-23 RX ADMIN — HYDROCODONE BITARTRATE AND ACETAMINOPHEN 1 TABLET: 10; 325 TABLET ORAL at 07:04

## 2017-04-23 RX ADMIN — MAGNESIUM SULFATE HEPTAHYDRATE 2 G: 40 INJECTION, SOLUTION INTRAVENOUS at 01:04

## 2017-04-23 RX ADMIN — LEVALBUTEROL 1.25 MG: 1.25 SOLUTION, CONCENTRATE RESPIRATORY (INHALATION) at 07:04

## 2017-04-23 RX ADMIN — Medication 400 MG: at 12:04

## 2017-04-23 RX ADMIN — PROCHLORPERAZINE EDISYLATE 5 MG: 5 INJECTION INTRAMUSCULAR; INTRAVENOUS at 01:04

## 2017-04-23 RX ADMIN — INSULIN HUMAN 10 UNITS: 100 INJECTION, SOLUTION PARENTERAL at 01:04

## 2017-04-23 RX ADMIN — SODIUM POLYSTYRENE SULFONATE 15 G: 15 SUSPENSION ORAL; RECTAL at 01:04

## 2017-04-23 RX ADMIN — LEVALBUTEROL 1.25 MG: 1.25 SOLUTION, CONCENTRATE RESPIRATORY (INHALATION) at 02:04

## 2017-04-23 RX ADMIN — DEXTROSE MONOHYDRATE 25 G: 25 INJECTION, SOLUTION INTRAVENOUS at 01:04

## 2017-04-23 RX ADMIN — Medication 1 G: at 01:04

## 2017-04-23 RX ADMIN — OXYCODONE HYDROCHLORIDE 10 MG: 10 TABLET, FILM COATED, EXTENDED RELEASE ORAL at 03:04

## 2017-04-23 RX ADMIN — LINEZOLID 600 MG: 600 INJECTION, SOLUTION INTRAVENOUS at 05:04

## 2017-04-23 RX ADMIN — PIPERACILLIN SODIUM AND TAZOBACTAM SODIUM 4.5 G: 4; .5 INJECTION, POWDER, LYOPHILIZED, FOR SOLUTION INTRAVENOUS at 02:04

## 2017-04-23 RX ADMIN — GABAPENTIN 600 MG: 300 CAPSULE ORAL at 02:04

## 2017-04-23 RX ADMIN — HYDROCODONE BITARTRATE AND ACETAMINOPHEN 1 TABLET: 10; 325 TABLET ORAL at 12:04

## 2017-04-23 RX ADMIN — MIDAZOLAM HYDROCHLORIDE 2 MG: 1 INJECTION, SOLUTION INTRAMUSCULAR; INTRAVENOUS at 10:04

## 2017-04-23 RX ADMIN — PIPERACILLIN SODIUM AND TAZOBACTAM SODIUM 4.5 G: 4; .5 INJECTION, POWDER, LYOPHILIZED, FOR SOLUTION INTRAVENOUS at 10:04

## 2017-04-23 RX ADMIN — SODIUM CHLORIDE: 0.9 INJECTION, SOLUTION INTRAVENOUS at 10:04

## 2017-04-23 NOTE — PLAN OF CARE
Problem: Fall Risk (Adult)  Goal: Identify Related Risk Factors and Signs and Symptoms  Related risk factors and signs and symptoms are identified upon initiation of Human Response Clinical Practice Guideline (CPG)   Outcome: Ongoing (interventions implemented as appropriate)  No falls this shift. Safety maintained.  Goal: Absence of Falls  Patient will demonstrate the desired outcomes by discharge/transition of care.   Outcome: Ongoing (interventions implemented as appropriate)  No falls this shift. Safety maintained.    Problem: Patient Care Overview  Goal: Plan of Care Review  Outcome: Ongoing (interventions implemented as appropriate)  Patient arrived from floor this am. Transient hypotension coupled with having received Ativan in ER. Patient was drowsy. Patient did receive IV fluids. A central line was placed. Levophed was ordered but has not had to be started. VS monitored closely. Labs monitored. Lactic acid is decreased. No fevers. Medications per orders. Patient able to sit up on side of bed an eat her supper. Blood glucoses have been monitored per orders and have been good.     Problem: Pressure Ulcer Risk (Jung Scale) (Adult,Obstetrics,Pediatric)  Goal: Identify Related Risk Factors and Signs and Symptoms  Related risk factors and signs and symptoms are identified upon initiation of Human Response Clinical Practice Guideline (CPG)   Outcome: Ongoing (interventions implemented as appropriate)  Patient has wound to left side of abdomen and left inguinal fold. This is present upon admission.  Goal: Skin Integrity  Patient will demonstrate the desired outcomes by discharge/transition of care.   Outcome: Ongoing (interventions implemented as appropriate)  Ultrasound done today to assess wound on abdomen. Surgery consult. Dr Wilson came to see patient.    Problem: Infection, Risk/Actual (Adult)  Goal: Identify Related Risk Factors and Signs and Symptoms  Related risk factors and signs and symptoms are  identified upon initiation of Human Response Clinical Practice Guideline (CPG)   Outcome: Ongoing (interventions implemented as appropriate)  Antibiotics as ordered and VS and labs monitored.

## 2017-04-23 NOTE — ASSESSMENT & PLAN NOTE
Check BG prior to meals and QHS. Administer rapid-acting insulin according to low-dose sliding scale  Hold oral antihyperglymics

## 2017-04-23 NOTE — ASSESSMENT & PLAN NOTE
- continue low dose beta blocker for rate control  - hold Lasix and ACEI in setting of sepsis  - one liter bolus given sepsis and hypotension  - can IV diurese if needed

## 2017-04-23 NOTE — SUBJECTIVE & OBJECTIVE
No current facility-administered medications on file prior to encounter.      Current Outpatient Prescriptions on File Prior to Encounter   Medication Sig    albuterol (ACCUNEB) 0.63 mg/3 mL Nebu INHALE THE CONTENTS OF 1 VIAL  BY  NEBULIZER EVERY 6 HOURS AS NEEDED    albuterol-ipratropium 2.5mg-0.5mg/3mL (DUO-NEB) 0.5 mg-3 mg(2.5 mg base)/3 mL nebulizer solution INHALE THE CONTENTS OF 1 VIAL VIA NEBULIZER EVERY 6 HOURS AS NEEDED FOR  WHEEZING (DO NOT USE WITH ALBUTEROL INHALER)    ascorbic acid, vitamin C, (VITAMIN C) 500 MG tablet Take 1 tablet (500 mg total) by mouth every evening.    atorvastatin (LIPITOR) 20 MG tablet Take 1 tablet (20 mg total) by mouth once daily.    clonazePAM (KLONOPIN) 1 MG tablet Take 1 tablet (1 mg total) by mouth 2 (two) times daily as needed for Anxiety.    furosemide (LASIX) 40 MG tablet Take 1 tablet (40 mg total) by mouth 2 (two) times daily.    gabapentin (NEURONTIN) 600 MG tablet Take 1 tablet (600 mg total) by mouth 3 (three) times daily.    lisinopril (PRINIVIL,ZESTRIL) 20 MG tablet Take 0.5 tablets (10 mg total) by mouth once daily.    metformin (GLUCOPHAGE) 500 MG tablet TAKE 1 TABLET TWICE DAILY WITH MEALS    methocarbamol (ROBAXIN) 750 MG Tab TAKE 1 TABLET FOUR TIMES DAILY    metoprolol succinate (TOPROL-XL) 25 MG 24 hr tablet Take 0.5 tablets (12.5 mg total) by mouth once daily. On hold waiting for dr visit    miconazole NITRATE 2 % (MICOTIN) 2 % top powder Apply topically 2 (two) times daily. Skin folds    multivit, Ca, min-FA-soy isofl 400-60 mcg-mg Tab Take 1 tablet by mouth once daily.    multivitamin (THERAGRAN) tablet Take 1 tablet by mouth once daily.    polyethylene glycol (GLYCOLAX) 17 gram/dose powder MIX 17 GRAMS IN LIQUID AND DRINK EVERY DAY AS NEEDED    potassium chloride SA (K-DUR,KLOR-CON) 20 MEQ tablet Take 1 tablet (20 mEq total) by mouth once daily.    SANTYL ointment APPLY  OINTMENT TOPICALLY TO AFFECTED AREA ONCE DAILY (Patient taking  differently: APPLY  OINTMENT TOPICALLY TO left thigh AFFECTED AREA ONCE DAILY)    SPIRIVA WITH HANDIHALER 18 mcg inhalation capsule INHALE THE CONTENTS OF 1 CAPSULE EVERY DAY    temazepam (RESTORIL) 30 mg capsule Take 1 capsule (30 mg total) by mouth nightly as needed.    VENTOLIN HFA 90 mcg/actuation inhaler INHALE TWO PUFFS BY MOUTH EVERY 6 HOURS AS NEEDED FOR WHEEZING    warfarin (COUMADIN) 5 MG tablet Take 0.5 tablets (2.5 mg total) by mouth Daily.    acetaminophen (TYLENOL) 325 MG tablet Take 2 tablets (650 mg total) by mouth every 6 (six) hours as needed.    budesonide (PULMICORT) 0.5 mg/2 mL nebulizer solution INHALE THE CONTENTS OF 1 VIAL VIA NEBULIZER EVERY DAY (Patient taking differently: INHALE THE CONTENTS OF 1 VIAL VIA NEBULIZER Three times EVERY DAY as needed)    escitalopram oxalate (LEXAPRO) 20 MG tablet Take 1 tablet (20 mg total) by mouth once daily.    fluconazole (DIFLUCAN) 100 MG tablet Take 1 tablet (100 mg total) by mouth once daily.    hydrocodone-acetaminophen 10-325mg (NORCO)  mg Tab Take 1 tablet by mouth every 6 (six) hours as needed for Pain.    magnesium hydroxide 400 mg/5 ml (MILK OF MAGNESIA) 400 mg/5 mL Susp Take 30 mLs by mouth daily as needed.    magnesium oxide (MAG-OX) 400 mg tablet Take 1 tablet (400 mg total) by mouth 2 (two) times daily.       Review of patient's allergies indicates:   Allergen Reactions    Dilaudid [hydromorphone] Anaphylaxis     Other reaction(s): Anaphylaxis  Other reaction(s): Unknown       Past Medical History:   Diagnosis Date    *Atrial flutter     Anxiety     Arthritis     Asthma     Atrial fibrillation     Back pain     Cataract     OD    CHF (congestive heart failure)     COPD (chronic obstructive pulmonary disease)     Depression     Diabetes mellitus     Emphysema of lung     Heart failure     Hernia     Hypercapnic respiratory failure, chronic 11/16/2016    Hyperlipidemia     Hypertension     Iron deficiency  anemia 2/3/2016    Myocardial infarction     Obesity     Peripheral vascular disease     Pneumonia     Polyneuropathy     Retinal detachment     OS    Septic shock 2017    Skin ulcer 3/18/2017    Tobacco dependence     Type II 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, not stated as uncontrolled      Past Surgical History:   Procedure Laterality Date    CATARACT EXTRACTION Left     OS      SECTION      x2    EYE SURGERY      HYSTERECTOMY      OOPHORECTOMY      one ovary conserved    RETINAL DETACHMENT SURGERY      buckle --OS    TONSILLECTOMY       Family History     Problem Relation (Age of Onset)    Alcohol abuse Mother    Arthritis Father, Sister    Blindness Son    Cancer Brother    Crohn's disease Sister    Early death Sister (30)    Heart disease Father, Sister (32), Sister    No Known Problems Brother, Daughter        Social History Main Topics    Smoking status: Former Smoker     Packs/day: 0.25     Years: 50.00     Types: Cigarettes     Start date: 1968     Quit date: 2015    Smokeless tobacco: Former User     Quit date: 2017      Comment: 1 ppd for 20 years    Alcohol use No    Drug use: No    Sexual activity: Not Currently     Review of Systems   Constitutional: Positive for fatigue and fever. Negative for appetite change, chills, diaphoresis and unexpected weight change.   HENT: Negative for hearing loss, sore throat, trouble swallowing and voice change.    Eyes: Negative for visual disturbance.   Respiratory: Negative for cough, shortness of breath and wheezing.    Cardiovascular: Negative for chest pain, palpitations and leg swelling.   Gastrointestinal: Negative for abdominal distention, abdominal pain, anal bleeding, blood in stool, constipation, diarrhea, nausea, rectal pain and vomiting.   Genitourinary: Negative for difficulty urinating, dysuria, flank pain, frequency, hematuria, menstrual problem and urgency.    Musculoskeletal: Negative for arthralgias, back pain, joint swelling, myalgias and neck pain.   Skin: Positive for color change, rash and wound. Negative for pallor.   Neurological: Negative for dizziness, syncope, weakness and headaches.   Hematological: Negative for adenopathy. Does not bruise/bleed easily.   Psychiatric/Behavioral: Negative for suicidal ideas. The patient is not nervous/anxious.      Objective:     Vital Signs (Most Recent):  Temp: 99.1 °F (37.3 °C) (04/23/17 0845)  Pulse: 86 (04/23/17 0940)  Resp: (!) 22 (04/23/17 0845)  BP: (!) 85/47 (04/23/17 0940)  SpO2: 98 % (04/23/17 0940) Vital Signs (24h Range):  Temp:  [97.1 °F (36.2 °C)-103.2 °F (39.6 °C)] 99.1 °F (37.3 °C)  Pulse:  [] 86  Resp:  [22-26] 22  SpO2:  [93 %-99 %] 98 %  BP: ()/(30-74) 85/47     Weight: (!) 149.4 kg (329 lb 5.9 oz)  Body mass index is 50.08 kg/(m^2).    Physical Exam   Constitutional: She is oriented to person, place, and time. She appears well-developed and well-nourished. No distress.   HENT:   Head: Normocephalic and atraumatic.   Right Ear: External ear normal.   Left Ear: External ear normal.   Eyes: Conjunctivae are normal. Pupils are equal, round, and reactive to light. Right eye exhibits no discharge. Left eye exhibits no discharge.   Neck: No tracheal deviation present. No thyromegaly present.   Cardiovascular: Normal rate and regular rhythm.    Pulmonary/Chest: Effort normal. No respiratory distress.   Musculoskeletal: She exhibits no edema or tenderness.   Lymphadenopathy:     She has no cervical adenopathy.   Neurological: She is alert and oriented to person, place, and time. No cranial nerve deficit.   Skin: Skin is warm and dry. No rash noted. She is not diaphoretic. No pallor.   See photo   Psychiatric: She has a normal mood and affect. Her behavior is normal. Judgment and thought content normal.   Nursing note and vitals reviewed.              Significant Labs:  CBC:   Recent Labs  Lab  04/23/17  0544   WBC 23.50*   RBC 4.56   HGB 11.7*   HCT 36.6*      MCV 80*   MCH 25.7*   MCHC 32.0     CMP:   Recent Labs  Lab 04/23/17  0544   *   CALCIUM 9.9   ALBUMIN 3.2*   PROT 7.4      K 4.2   CO2 24      BUN 30*   CREATININE 1.0   ALKPHOS 98   ALT 25   AST 42*   BILITOT 0.5     Coagulation:   Recent Labs  Lab 04/23/17 0544   INR 3.9*       Significant Diagnostics:  I have reviewed all pertinent imaging results/findings within the past 24 hours.

## 2017-04-23 NOTE — PROGRESS NOTES
Admitted to room 210 from ER.  Pt not following commands without repeated instructions and tactile stimulus.  She is febrile with 103.2.  Has wound on left side of abdomen. Pictures taken of wound.  Called JIGNESH Marie to eval patient.  New orders given for blood cultures and antibiotics.

## 2017-04-23 NOTE — NURSING
Noted trend of BP. Patient is drowsy but easily awakens. Dr Miles contacted. New order received for Levophed. States may give through peripheral if needed. Anesthesia contacted for central line placement.

## 2017-04-23 NOTE — ASSESSMENT & PLAN NOTE
Continue antibiotics as per above  Consult surgeon to evaluate for debridement  Consult wound care specialist

## 2017-04-23 NOTE — ASSESSMENT & PLAN NOTE
Likely from cellulitis, rule out abscess, bacteremia  Obtain blood for routine bacterial culture  Urine for routine bacterial culture  Broad-spectrum IV antibiotics - Zosyn, Linezolid  Surgeon consulted for evaluation  Lactate elevated and will give one liter bolus at this time  Trend WBC, temperature curve  Transfer to ICU given mild hypotension and unpredictable course- given co-morbidities there is high chance of decompensation

## 2017-04-23 NOTE — ED PROVIDER NOTES
"Encounter Date: 4/22/2017    SCRIBE #1 NOTE: I, Adrienne Barrera , am scribing for, and in the presence of, Dr. Magana .       History     Chief Complaint   Patient presents with    Anxiety     Review of patient's allergies indicates:   Allergen Reactions    Dilaudid [hydromorphone] Anaphylaxis     Other reaction(s): Anaphylaxis  Other reaction(s): Unknown     HPI Comments:     04/23/2017  12:14 AM     Chief Complaint: Panic attack      The patient is a 66 y.o. female with a PMHx of A-fib, Atrial flutter; Anxiety; Arthritis; Asthma; Atrial fibrillation; Back pain; Cataract; CHF (congestive heart failure); COPD (chronic obstructive pulmonary disease); Depression; Diabetes mellitus; Emphysema of lung; Heart failure; Hernia; Hypercapnic respiratory failure, chronic (11/16/2016); Hyperlipidemia; Hypertension; Iron deficiency anemia (2/3/2016); Myocardial infarction; Obesity; Peripheral vascular disease; Pneumonia; Polyneuropathy; Retinal detachment; Skin ulcer (3/18/2017); Type II 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,who is presenting per EMS with the acute onset of a panic attack that began just PTA. The pt reports she was "feeling fine" and suddenly became extremely anxious, tremulous, and endorses generalized weakness. No mitigating factors and the pt denies preceding "stressfull event".  Pt confirms that she has missed x 1 dose of Lexapro but endorses taking "all of her medication" as normal. The pt has been admitted multiple times within the last several months for sepsis secondary to a decubitus ulcer. No pertinent past surgical hx.                 The history is provided by the patient and medical records.     Past Medical History:   Diagnosis Date    *Atrial flutter     Anxiety     Arthritis     Asthma     Atrial fibrillation     Back pain     Cataract     OD    CHF (congestive heart failure)     COPD (chronic obstructive pulmonary disease)     Depression     Diabetes " mellitus     Emphysema of lung     Heart failure     Hernia     Hypercapnic respiratory failure, chronic 2016    Hyperlipidemia     Hypertension     Iron deficiency anemia 2/3/2016    Myocardial infarction     Obesity     Peripheral vascular disease     Pneumonia     Polyneuropathy     Retinal detachment     OS    Skin ulcer 3/18/2017    Tobacco dependence     Type II 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, not stated as uncontrolled      Past Surgical History:   Procedure Laterality Date    CATARACT EXTRACTION Left     OS      SECTION      x2    EYE SURGERY      HYSTERECTOMY      OOPHORECTOMY      one ovary conserved    RETINAL DETACHMENT SURGERY      buckle --OS    TONSILLECTOMY       Family History   Problem Relation Age of Onset    Arthritis Father     Heart disease Father     Early death Sister 30     heart disease    Heart disease Sister 32     MI    No Known Problems Brother     No Known Problems Daughter     Blindness Son      due to accident//    Arthritis Sister     Heart disease Sister     Crohn's disease Sister     Cancer Brother      asbestosis    Alcohol abuse Mother     Breast cancer Neg Hx     Glaucoma Neg Hx     Macular degeneration Neg Hx     Retinal detachment Neg Hx     Amblyopia Neg Hx     Diabetes Neg Hx     Cataracts Neg Hx     Hypertension Neg Hx     Strabismus Neg Hx     Stroke Neg Hx     Thyroid disease Neg Hx      Social History   Substance Use Topics    Smoking status: Former Smoker     Packs/day: 0.25     Years: 50.00     Types: Cigarettes     Start date: 1968     Quit date: 2015    Smokeless tobacco: Former User     Quit date: 2017      Comment: 1 ppd for 20 years    Alcohol use No     Review of Systems   Constitutional: Negative for fever.   HENT: Negative for sore throat.    Eyes: Negative for visual disturbance.   Respiratory: Negative for shortness of breath.     Cardiovascular: Negative for chest pain.   Gastrointestinal: Negative for nausea.   Genitourinary: Negative for dysuria.   Musculoskeletal: Negative for back pain.   Skin: Negative for rash.   Neurological: Positive for tremors and weakness.   Hematological: Does not bruise/bleed easily.   Psychiatric/Behavioral: Negative for confusion. The patient is nervous/anxious.        Physical Exam   Initial Vitals   BP Pulse Resp Temp SpO2   -- -- -- -- --            Physical Exam    Nursing note and vitals reviewed.  Constitutional: She appears well-developed and well-nourished.   HENT:   Head: Normocephalic and atraumatic.   Eyes: EOM are normal. Pupils are equal, round, and reactive to light.   Neck: Normal range of motion. Neck supple.   Cardiovascular: Regular rhythm, normal heart sounds and intact distal pulses. Exam reveals no gallop and no friction rub.    No murmur heard.  Tachycardic    Pulmonary/Chest: Breath sounds normal. She has no wheezes. She has no rhonchi. She has no rales.   Neurological: She is alert and oriented to person, place, and time. No sensory deficit.   Tremulous   Skin: Skin is warm and dry.   Area of well healing decubitus ulcer to the L lateral thigh. No purulent drainage. There is minimal tenderness with manipulation. Minimal area of surrounding erythema. Area of chronic cellulitis.        Psychiatric:   Anxious appearing          ED Course   Procedures  Labs Reviewed - No data to display  EKG Readings: (Independently Interpreted)   Heart Rate: 112.   Atrial fibrillation with rapid ventricular response. R axis deviation. Low QRS voltage. Cannot rule out anterior infarct. Minor but significant interval changes from previous EKG on 04/05/17.           Medical Decision Making:   Initial Assessment:   Patient was interviewed and examined immediately upon arrival.  At this time she does appear acutely anxious.  IV was established and she was placed on cardiac monitor and pulse ox.  An EKG does  indicate A fibrillation with rapid ventricular response.  Patient is currently anticoagulated.  Screening lab work and imaging will be obtained assess for underlying structural pulmonary disease and evidence progressing ACS.  She had good control of anxiety with IV Ativan.  Differential Diagnosis:   DDX include, but are not limited to, atypical ACS, acute anxiety reaction, dysrhythmia, pneumothorax, pneumonia, acute exacerbation of congestive heart failure, acute exacerbation of COPD, medical noncompliance, sepsis associated worsening cellulitis  ED Management:  Blood work up today significant for evidence of acutely worsening leukocytosis at a value of 18,000 with a left shift.  Comprehensive metabolic panel shows hyperkalemia at a value of 5.7.  She was provided multiple stabilizing agents for this.  She was provided 10 mg of IV Lopressor with improvement in rate down to around 100.  She does warrant admission for further stabilization of her symptoms and monitoring.  She is in agreement with this.  Case was discussed with the on-call PA,  Cynthia, who agreed to accept the patient.  She is transferred to telemetry bed in guarded condition.            Scribe Attestation:   Scribe #1: I performed the above scribed service and the documentation accurately describes the services I performed. I attest to the accuracy of the note.    Attending Attestation:         Attending Critical Care:   Critical Care Times:   Direct Patient Care (initial evaluation, reassessments, and time considering the case)................................................................5 minutes.   Additional History from reviewing old medical records or taking additional history from the family, EMS, PCP, etc.......................10 minutes.   Ordering, Reviewing, and Interpreting Diagnostic Studies...............................................................................................................5 minutes.    Documentation..................................................................................................................................................................................5 minutes.   Consultation with other Physicians. .................................................................................................................................................5 minutes.   Consultation with the patient's family directly relating to the patient's condition, care, and DNR status (when patient unable)......5 minutes.   Other..................................................................................................................................................................................................5 minutes.   ==============================================================  · Total Critical Care Time - exclusive of procedural time: 40 minutes.  ==============================================================  Critical care was necessary to treat or prevent imminent or life-threatening deterioration of the following conditions: cardiac arrhythmia.   The following critical care procedures were done by me (see procedure notes): pulse oximetry.   Critical care was time spent personally by me on the following activities: obtaining history from patient or relative, examination of patient, review of old charts, ordering lab, x-rays, and/or EKG, development of treatment plan with patient or relative, ordering and performing treatments and interventions, evaluation of patient's response to treatment, discussions with primary provider, interpretation of cardiac measurements and re-evaluation of patient's conition.   Critical Care Condition: life-threatening     Physician Attestation for Scribe:  Physician Attestation Statement for Scribe #1: I, Dr. Magana , reviewed documentation, as scribed by Adrinene Barrera  in my presence, and it is both accurate and complete.                 ED Course      Clinical Impression:   The primary encounter diagnosis was Hyperkalemia. Diagnoses of Atrial fibrillation, rapid, Septic shock, Cellulitis, abdominal wall, Sepsis, and Anxiety in acute stress reaction were also pertinent to this visit.    Disposition:   Disposition: Admitted  Condition: Serious       Adrien Magana MD  04/24/17 0051

## 2017-04-23 NOTE — PROGRESS NOTES
[facility] Medicine  Progress Note    Patient Name: Nelly Tian  MRN: [id number]  Patient Class: OP- Observation   Admission Date: 4/22/2017  Length of Stay: 0 days  Attending Physician: Geovanny Miles MD  Primary Care Provider: Constantine Bird MD        Subjective:     Principal Problem:Sepsis    HPI:  Patient presents for evaluation of anxiety. She is a poor historian. She received anxiolytic prior to my interview and exam. She reports feeling anxious but can't provide time of onset. She reports using nebulized bronchodilators routinely. She reports chronic shortness of breath and states she uses supplemental oxygen at home continuously. She is unable to say if her shortness of breath has worsened. She denies peripheral edema. She reports using 3-4 pillows to sleep. She denies increased orthopnea. She reports having LEFT-sided wound which is cared for by home healthcare. She reports antibiotic regimen being finished. She denies fever or chills. She denies cough, sore throat. She denies dysuria, flank pain. She denies chest pain.    She was evaluated in the ED. She was administered anxiolytic. She was found to be in atrial fibrillation with RVR. She was administered IV diltiazem which improved her heart rate. She was admitted to telemetry unit. She became febrile. Blood culture obtained. IV Linezolid, Zosyn administered.      Hospital Course:       Interval History: Patient seen and examined.  Still somnolent from overnight after getting benzos.  Febrile and mildly hypotensive at this time.  Pain at site of existing abdominal wound but otherwise denies any other complaints including dysuria, cough, diarrhea, headache, or any other related complaints.    Review of Systems   Constitutional: Positive for fatigue and fever. Negative for activity change and appetite change.   HENT: Negative.    Eyes: Negative.    Respiratory: Negative for chest tightness, shortness of breath and  wheezing.    Cardiovascular: Negative for chest pain, palpitations and leg swelling.   Gastrointestinal: Negative for abdominal distention, abdominal pain, blood in stool, diarrhea and vomiting.   Genitourinary: Negative for dysuria and hematuria.   Skin: Positive for color change.        Erythema on left side of pannus with skin breakdown.  No purulence noted.   Neurological: Negative for headaches.   Hematological: Negative for adenopathy.   Psychiatric/Behavioral: Negative for confusion.     Objective:     Vital Signs (Most Recent):  Temp: 100.2 °F (37.9 °C) (04/23/17 0531)  Pulse: 94 (04/23/17 0749)  Resp: (!) 24 (04/23/17 0749)  BP: (!) 104/53 (04/23/17 0330)  SpO2: 99 % (04/23/17 0749) Vital Signs (24h Range):  Temp:  [97.1 °F (36.2 °C)-103.2 °F (39.6 °C)] 100.2 °F (37.9 °C)  Pulse:  [] 94  Resp:  [24-26] 24  SpO2:  [93 %-99 %] 99 %  BP: (104-127)/(53-74) 104/53     Weight: (!) 149.4 kg (329 lb 5.9 oz)  Body mass index is 50.08 kg/(m^2).    Intake/Output Summary (Last 24 hours) at 04/23/17 0811  Last data filed at 04/23/17 0656   Gross per 24 hour   Intake              760 ml   Output                0 ml   Net              760 ml      Physical Exam   Constitutional: She is oriented to person, place, and time. She appears well-developed and well-nourished. No distress.   somnolent   HENT:   Head: Normocephalic and atraumatic.   Eyes: Pupils are equal, round, and reactive to light.   Neck: Neck supple. No thyromegaly present.   Cardiovascular: Normal rate and regular rhythm.  Exam reveals no gallop and no friction rub.    No murmur heard.  Pulmonary/Chest: Effort normal and breath sounds normal. No respiratory distress. She has no wheezes.   Abdominal: Soft. Bowel sounds are normal. She exhibits no distension. There is no tenderness. There is no guarding.   Musculoskeletal: Normal range of motion. She exhibits no edema.   Neurological: She is alert and oriented to person, place, and time.   Skin: Skin is  warm and dry. No erythema.   Diffuse erythema with skin breakdown noted on left pannus.  TTP   Psychiatric: She has a normal mood and affect.       Significant Labs:   CBC:   Recent Labs  Lab 04/23/17  0038 04/23/17  0544   WBC 18.50* 23.50*   HGB 12.9 11.7*   HCT 40.6 36.6*    267       Significant Imaging: I have reviewed all pertinent imaging results/findings within the past 24 hours.    Assessment/Plan:      * Sepsis  Likely from cellulitis, rule out abscess, bacteremia  Obtain blood for routine bacterial culture  Urine for routine bacterial culture  Broad-spectrum IV antibiotics - Zosyn, Linezolid  Surgeon consulted for evaluation  Lactate elevated and will give one liter bolus at this time  Trend WBC, temperature curve  Transfer to ICU given mild hypotension and unpredictable course- given co-morbidities there is high chance of decompensation        Chronic atrial fibrillation  - continue low dose metoprolol  - coumadin held secondary to super therapeutic INR  - will monitor  - if hypotension becomes an issue with persistent afib, will consider amiodarone      Hypertension associated with diabetes  - on low dose beta blocker  - hold ACEI      Chronic combined systolic and diastolic congestive heart failure  - continue low dose beta blocker for rate control  - hold Lasix and ACEI in setting of sepsis  - one liter bolus given sepsis and hypotension  - can IV diurese if needed    Cellulitis, abdominal wall  Continue antibiotics as per above  Consult surgeon to evaluate for debridement  Consult wound care specialist      Atrial fibrillation, rapid  -Continue Metoprolol  -Will consider amio if hypotension and rate control becomes an issue      Chronic respiratory failure  Continue supplemental oxygen  Monitor respiratory status  Trial of cpap at night?      Hyperkalemia  - monitor after fluid       VTE Risk Mitigation     None          Geovanny Miles MD  Department of Hospital Medicine   [facility]

## 2017-04-23 NOTE — H&P
[facility] Medicine  History & Physical    Patient Name: Nelly Tian  MRN: [id number]  Admission Date: 4/22/2017  Attending Physician: Geovanny Miles MD   Primary Care Provider: Constantine Bird MD         Patient information was obtained from patient, past medical records and ER records.     Subjective:     Principal Problem:Sepsis    Chief Complaint:   Chief Complaint   Patient presents with    Anxiety        HPI: Patient presents for evaluation of anxiety. She is a poor historian. She received anxiolytic prior to my interview and exam. She reports feeling anxious but can't provide time of onset. She reports using nebulized bronchodilators routinely. She reports chronic shortness of breath and states she uses supplemental oxygen at home continuously. She is unable to say if her shortness of breath has worsened. She denies peripheral edema. She reports using 3-4 pillows to sleep. She denies increased orthopnea. She reports having LEFT-sided wound which is cared for by home healthcare. She reports antibiotic regimen being finished. She denies fever or chills. She denies cough, sore throat. She denies dysuria, flank pain. She denies chest pain.    She was evaluated in the ED. She was administered anxiolytic. She was found to be in atrial fibrillation with RVR. She was administered IV diltiazem which improved her heart rate. She was admitted to telemetry unit. She became febrile. Blood culture obtained. IV Linezolid, Zosyn administered.      Past Medical History:   Diagnosis Date    *Atrial flutter     Anxiety     Arthritis     Asthma     Atrial fibrillation     Back pain     Cataract     OD    CHF (congestive heart failure)     COPD (chronic obstructive pulmonary disease)     Depression     Diabetes mellitus     Emphysema of lung     Heart failure     Hernia     Hypercapnic respiratory failure, chronic 11/16/2016    Hyperlipidemia     Hypertension     Iron  deficiency anemia 2/3/2016    Myocardial infarction     Obesity     Peripheral vascular disease     Pneumonia     Polyneuropathy     Retinal detachment     OS    Skin ulcer 3/18/2017    Tobacco dependence     Type II 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, not stated as uncontrolled        Past Surgical History:   Procedure Laterality Date    CATARACT EXTRACTION Left     OS      SECTION      x2    EYE SURGERY      HYSTERECTOMY      OOPHORECTOMY      one ovary conserved    RETINAL DETACHMENT SURGERY      buckle --OS    TONSILLECTOMY         Review of patient's allergies indicates:   Allergen Reactions    Dilaudid [hydromorphone] Anaphylaxis     Other reaction(s): Anaphylaxis  Other reaction(s): Unknown       No current facility-administered medications on file prior to encounter.      Current Outpatient Prescriptions on File Prior to Encounter   Medication Sig    albuterol (ACCUNEB) 0.63 mg/3 mL Nebu INHALE THE CONTENTS OF 1 VIAL  BY  NEBULIZER EVERY 6 HOURS AS NEEDED    albuterol-ipratropium 2.5mg-0.5mg/3mL (DUO-NEB) 0.5 mg-3 mg(2.5 mg base)/3 mL nebulizer solution INHALE THE CONTENTS OF 1 VIAL VIA NEBULIZER EVERY 6 HOURS AS NEEDED FOR  WHEEZING (DO NOT USE WITH ALBUTEROL INHALER)    ascorbic acid, vitamin C, (VITAMIN C) 500 MG tablet Take 1 tablet (500 mg total) by mouth every evening.    atorvastatin (LIPITOR) 20 MG tablet Take 1 tablet (20 mg total) by mouth once daily.    clonazePAM (KLONOPIN) 1 MG tablet Take 1 tablet (1 mg total) by mouth 2 (two) times daily as needed for Anxiety.    furosemide (LASIX) 40 MG tablet Take 1 tablet (40 mg total) by mouth 2 (two) times daily.    gabapentin (NEURONTIN) 600 MG tablet Take 1 tablet (600 mg total) by mouth 3 (three) times daily.    lisinopril (PRINIVIL,ZESTRIL) 20 MG tablet Take 0.5 tablets (10 mg total) by mouth once daily.    metformin (GLUCOPHAGE) 500 MG tablet TAKE 1 TABLET TWICE DAILY WITH MEALS     methocarbamol (ROBAXIN) 750 MG Tab TAKE 1 TABLET FOUR TIMES DAILY    metoprolol succinate (TOPROL-XL) 25 MG 24 hr tablet Take 0.5 tablets (12.5 mg total) by mouth once daily. On hold waiting for dr visit    miconazole NITRATE 2 % (MICOTIN) 2 % top powder Apply topically 2 (two) times daily. Skin folds    multivit, Ca, min-FA-soy isofl 400-60 mcg-mg Tab Take 1 tablet by mouth once daily.    multivitamin (THERAGRAN) tablet Take 1 tablet by mouth once daily.    polyethylene glycol (GLYCOLAX) 17 gram/dose powder MIX 17 GRAMS IN LIQUID AND DRINK EVERY DAY AS NEEDED    potassium chloride SA (K-DUR,KLOR-CON) 20 MEQ tablet Take 1 tablet (20 mEq total) by mouth once daily.    SANTYL ointment APPLY  OINTMENT TOPICALLY TO AFFECTED AREA ONCE DAILY (Patient taking differently: APPLY  OINTMENT TOPICALLY TO left thigh AFFECTED AREA ONCE DAILY)    SPIRIVA WITH HANDIHALER 18 mcg inhalation capsule INHALE THE CONTENTS OF 1 CAPSULE EVERY DAY    temazepam (RESTORIL) 30 mg capsule Take 1 capsule (30 mg total) by mouth nightly as needed.    VENTOLIN HFA 90 mcg/actuation inhaler INHALE TWO PUFFS BY MOUTH EVERY 6 HOURS AS NEEDED FOR WHEEZING    warfarin (COUMADIN) 5 MG tablet Take 0.5 tablets (2.5 mg total) by mouth Daily.    acetaminophen (TYLENOL) 325 MG tablet Take 2 tablets (650 mg total) by mouth every 6 (six) hours as needed.    budesonide (PULMICORT) 0.5 mg/2 mL nebulizer solution INHALE THE CONTENTS OF 1 VIAL VIA NEBULIZER EVERY DAY (Patient taking differently: INHALE THE CONTENTS OF 1 VIAL VIA NEBULIZER Three times EVERY DAY as needed)    escitalopram oxalate (LEXAPRO) 20 MG tablet Take 1 tablet (20 mg total) by mouth once daily.    fluconazole (DIFLUCAN) 100 MG tablet Take 1 tablet (100 mg total) by mouth once daily.    hydrocodone-acetaminophen 10-325mg (NORCO)  mg Tab Take 1 tablet by mouth every 6 (six) hours as needed for Pain.    magnesium hydroxide 400 mg/5 ml (MILK OF MAGNESIA) 400 mg/5 mL Susp  Take 30 mLs by mouth daily as needed.    magnesium oxide (MAG-OX) 400 mg tablet Take 1 tablet (400 mg total) by mouth 2 (two) times daily.     Family History     Problem Relation (Age of Onset)    Alcohol abuse Mother    Arthritis Father, Sister    Blindness Son    Cancer Brother    Crohn's disease Sister    Early death Sister (30)    Heart disease Father, Sister (32), Sister    No Known Problems Brother, Daughter        Social History Main Topics    Smoking status: Former Smoker     Packs/day: 0.25     Years: 50.00     Types: Cigarettes     Start date: 1/19/1968     Quit date: 9/19/2015    Smokeless tobacco: Former User     Quit date: 2/12/2017      Comment: 1 ppd for 20 years    Alcohol use No    Drug use: No    Sexual activity: Not Currently     Review of Systems   Unable to perform ROS: Other     Objective:     Vital Signs (Most Recent):  Temp: (!) 103.2 °F (39.6 °C) (04/23/17 0330)  Pulse: (!) 114 (04/23/17 0330)  Resp: (!) 26 (04/23/17 0330)  BP: (!) 104/53 (04/23/17 0330)  SpO2: 95 % (04/23/17 0330) Vital Signs (24h Range):  Temp:  [97.1 °F (36.2 °C)-103.2 °F (39.6 °C)] 103.2 °F (39.6 °C)  Pulse:  [103-141] 114  Resp:  [25-26] 26  SpO2:  [93 %-95 %] 95 %  BP: (104-127)/(53-74) 104/53     Weight: (!) 149.4 kg (329 lb 5.9 oz)  Body mass index is 50.08 kg/(m^2).    Physical Exam   Constitutional: She appears well-developed and well-nourished.   HENT:   Head: Normocephalic and atraumatic.   Nose: Nose normal.   Eyes: Right eye exhibits no discharge. Left eye exhibits no discharge. No scleral icterus.   Neck: Neck supple. No JVD present.   Cardiovascular: An irregularly irregular rhythm present. Tachycardia present.    Pulmonary/Chest: Effort normal. She has no wheezes.   Abdominal: Bowel sounds are normal. She exhibits no distension. There is no rebound and no guarding.   Abdomen morbidly obese/ LEFT lower abdomen erythematous, drainage to gauze.   Musculoskeletal: She exhibits no edema or tenderness.    Neurological: She exhibits normal muscle tone.   Skin:   LEFT abdomen erythematous, warm, drainage.   Psychiatric: Her affect is not angry. She is not aggressive.   Nursing note and vitals reviewed.       Significant Labs:   Recent Lab Results       04/23/17  0050 04/23/17  0039 04/23/17  0038      Albumin   3.6     Alkaline Phosphatase   100     ALT   18     Anion Gap   16     Aniso   Slight     Appearance, UA Clear       AST   32     BANDS   3.0     Baso #   CANCELED  Comment:  Result canceled by the ancillary     Basophil%   0.0     Bilirubin (UA) Negative       Total Bilirubin   0.3  Comment:  For infants and newborns, interpretation of results should be based  on gestational age, weight and in agreement with clinical  observations.  Premature Infant recommended reference ranges:  Up to 24 hours.............<8.0 mg/dL  Up to 48 hours............<12.0 mg/dL  3-5 days..................<15.0 mg/dL  6-29 days.................<15.0 mg/dL       BUN, Bld   24(H)     Calcium   10.3     Chloride   99     CO2   25     Color, UA Yellow       Creatinine   0.9     Differential Method   Manual     eGFR if    >60     eGFR if non    >60  Comment:  Calculation used to obtain the estimated glomerular filtration  rate (eGFR) is the CKD-EPI equation. Since race is unknown   in our information system, the eGFR values for   -American and Non--American patients are given   for each creatinine result.       Eos #   CANCELED  Comment:  Result canceled by the ancillary     Eosinophil%   3.0     Glucose   141(H)     Glucose, UA Negative       Gran%   84.0(H)     Hematocrit   40.6     Hemoglobin   12.9     Coumadin Monitoring INR  4.2  Comment:  Coumadin Therapy:  2.0 - 3.0 for INR for all indicators except mechanical heart valves  and antiphospholipid syndromes which should use 2.5 - 3.5.  (H)      Ketones, UA Negative       Leukocytes, UA Negative       Lymph #   CANCELED  Comment:  Result  canceled by the ancillary     Lymph%   3.0(L)     MCH   25.5(L)     MCHC   31.7(L)     MCV   81(L)     Mono #   CANCELED  Comment:  Result canceled by the ancillary     Mono%   7.0     MPV   8.9(L)     Nitrite, UA Negative       Occult Blood UA Trace(A)       Ovalocytes   Occasional     pH, UA 6.0       Platelet Estimate   Appears normal     Platelets   321     Poik   Slight     Poly   Occasional     Potassium   5.7(H)     Total Protein   8.8(H)     Protein, UA Negative  Comment:  Recommend a 24 hour urine protein or a urine   protein/creatinine ratio if globulin induced proteinuria is  clinically suspected.         Protime  41.5(H)      RBC   5.04     RDW   17.6(H)     Sodium   140     Specific Gravity, UA 1.015       Specimen UA Urine, Clean Catch       Troponin I   <0.006  Comment:  The reference interval for Troponin I represents the 99th percentile   cutoff   for our facility and is consistent with 3rd generation assay   performance.       TSH  1.339      Urobilinogen, UA Negative       WBC   18.50(H)         All pertinent labs within the past 24 hours have been reviewed.        Assessment/Plan:     * Sepsis  Likely from cellulitis, rule out abscess, bacteremia  Obtain blood for routine bacterial culture  Urine for routine bacterial culture  Broad-spectrum IV antibiotics - Zosyn, Linezolid  Consult surgeon to evaluate cellulitic area for ?abscess/debridement of infected tissue  Serial lactate  Trend WBC, temperature curve        Cellulitis, abdominal wall  Continue antibiotics as per above  Consult surgeon to evaluate for debridement  Consult wound care specialist      Atrial fibrillation, rapid  Continue Metoprolol  Hold Warfarin as INR supratherapeutic      Chronic combined systolic and diastolic congestive heart failure  Continue Metoprolol, Lisinopril, Furosemide  Monitor I/O, daily weight  Monitor respiratory status, signs and symptoms of CHF      Chronic respiratory failure  Continue supplemental  oxygen  Monitor respiratory status      Type 2 diabetes mellitus with complication, without long-term current use of insulin  Check BG prior to meals and QHS. Administer rapid-acting insulin according to low-dose sliding scale  Hold oral antihyperglymics        Major depression, recurrent, chronic  Continue Escitalopram      Chronic atrial fibrillation  Continue Metoprolol  Hold Warfarin  Monitor on telemetry      Hypertension associated with diabetes  Continue Metoprolol, Lisinopril      Hyperlipidemia  Continue Atorvastatin      Obesity, Class III, BMI 40-49.9 (morbid obesity)  Discuss weight-loss with diet and exercise and her habitus is danger danger to her health/well-being      VTE Risk Mitigation     None        Sneha Fonseca  Department of Hospital Medicine   [facility]

## 2017-04-23 NOTE — CONSULTS
[facility]  General Surgery  Consult Note    Patient Name: Nelly Tian  MRN: [id number]  Code Status: Full Code  Admission Date: 4/22/2017  Hospital Length of Stay: 0 days  Attending Physician: Mayelin Gordillo MD  Primary Care Provider: Constantine Brid MD    Patient information was obtained from patient, past medical records and ER records.     Inpatient consult to General Surgery  Consult performed by: KEITH FRANCO  Consult ordered by: MAYELIN GORDILLO        Subjective:     Principal Problem: Sepsis    History of Present Illness: Patient presents for evaluation of anxiety. She is a poor historian. She received anxiolytic prior to my interview and exam. She reports feeling anxious but can't provide time of onset. She reports using nebulized bronchodilators routinely. She reports chronic shortness of breath and states she uses supplemental oxygen at home continuously. She is unable to say if her shortness of breath has worsened. She denies peripheral edema. She reports using 3-4 pillows to sleep. She denies increased orthopnea. She reports having LEFT-sided wound which is cared for by home healthcare. She reports antibiotic regimen being finished. She denies fever or chills. She denies cough, sore throat. She denies dysuria, flank pain. She denies chest pain.     She was evaluated in the ED. She was administered anxiolytic. She was found to be in atrial fibrillation with RVR. She was administered IV diltiazem which improved her heart rate. She was admitted to telemetry unit. She became febrile. Blood culture obtained. IV Linezolid, Zosyn administered.     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  It has been debrided in the past.  It is very tender      No current facility-administered medications on file prior to encounter.      Current Outpatient Prescriptions on File Prior to Encounter   Medication Sig    albuterol (ACCUNEB) 0.63 mg/3 mL Nebu  INHALE THE CONTENTS OF 1 VIAL  BY  NEBULIZER EVERY 6 HOURS AS NEEDED    albuterol-ipratropium 2.5mg-0.5mg/3mL (DUO-NEB) 0.5 mg-3 mg(2.5 mg base)/3 mL nebulizer solution INHALE THE CONTENTS OF 1 VIAL VIA NEBULIZER EVERY 6 HOURS AS NEEDED FOR  WHEEZING (DO NOT USE WITH ALBUTEROL INHALER)    ascorbic acid, vitamin C, (VITAMIN C) 500 MG tablet Take 1 tablet (500 mg total) by mouth every evening.    atorvastatin (LIPITOR) 20 MG tablet Take 1 tablet (20 mg total) by mouth once daily.    clonazePAM (KLONOPIN) 1 MG tablet Take 1 tablet (1 mg total) by mouth 2 (two) times daily as needed for Anxiety.    furosemide (LASIX) 40 MG tablet Take 1 tablet (40 mg total) by mouth 2 (two) times daily.    gabapentin (NEURONTIN) 600 MG tablet Take 1 tablet (600 mg total) by mouth 3 (three) times daily.    lisinopril (PRINIVIL,ZESTRIL) 20 MG tablet Take 0.5 tablets (10 mg total) by mouth once daily.    metformin (GLUCOPHAGE) 500 MG tablet TAKE 1 TABLET TWICE DAILY WITH MEALS    methocarbamol (ROBAXIN) 750 MG Tab TAKE 1 TABLET FOUR TIMES DAILY    metoprolol succinate (TOPROL-XL) 25 MG 24 hr tablet Take 0.5 tablets (12.5 mg total) by mouth once daily. On hold waiting for dr visit    miconazole NITRATE 2 % (MICOTIN) 2 % top powder Apply topically 2 (two) times daily. Skin folds    multivit, Ca, min-FA-soy isofl 400-60 mcg-mg Tab Take 1 tablet by mouth once daily.    multivitamin (THERAGRAN) tablet Take 1 tablet by mouth once daily.    polyethylene glycol (GLYCOLAX) 17 gram/dose powder MIX 17 GRAMS IN LIQUID AND DRINK EVERY DAY AS NEEDED    potassium chloride SA (K-DUR,KLOR-CON) 20 MEQ tablet Take 1 tablet (20 mEq total) by mouth once daily.    SANTYL ointment APPLY  OINTMENT TOPICALLY TO AFFECTED AREA ONCE DAILY (Patient taking differently: APPLY  OINTMENT TOPICALLY TO left thigh AFFECTED AREA ONCE DAILY)    SPIRIVA WITH HANDIHALER 18 mcg inhalation capsule INHALE THE CONTENTS OF 1 CAPSULE EVERY DAY    temazepam (RESTORIL)  30 mg capsule Take 1 capsule (30 mg total) by mouth nightly as needed.    VENTOLIN HFA 90 mcg/actuation inhaler INHALE TWO PUFFS BY MOUTH EVERY 6 HOURS AS NEEDED FOR WHEEZING    warfarin (COUMADIN) 5 MG tablet Take 0.5 tablets (2.5 mg total) by mouth Daily.    acetaminophen (TYLENOL) 325 MG tablet Take 2 tablets (650 mg total) by mouth every 6 (six) hours as needed.    budesonide (PULMICORT) 0.5 mg/2 mL nebulizer solution INHALE THE CONTENTS OF 1 VIAL VIA NEBULIZER EVERY DAY (Patient taking differently: INHALE THE CONTENTS OF 1 VIAL VIA NEBULIZER Three times EVERY DAY as needed)    escitalopram oxalate (LEXAPRO) 20 MG tablet Take 1 tablet (20 mg total) by mouth once daily.    fluconazole (DIFLUCAN) 100 MG tablet Take 1 tablet (100 mg total) by mouth once daily.    hydrocodone-acetaminophen 10-325mg (NORCO)  mg Tab Take 1 tablet by mouth every 6 (six) hours as needed for Pain.    magnesium hydroxide 400 mg/5 ml (MILK OF MAGNESIA) 400 mg/5 mL Susp Take 30 mLs by mouth daily as needed.    magnesium oxide (MAG-OX) 400 mg tablet Take 1 tablet (400 mg total) by mouth 2 (two) times daily.       Review of patient's allergies indicates:   Allergen Reactions    Dilaudid [hydromorphone] Anaphylaxis     Other reaction(s): Anaphylaxis  Other reaction(s): Unknown       Past Medical History:   Diagnosis Date    *Atrial flutter     Anxiety     Arthritis     Asthma     Atrial fibrillation     Back pain     Cataract     OD    CHF (congestive heart failure)     COPD (chronic obstructive pulmonary disease)     Depression     Diabetes mellitus     Emphysema of lung     Heart failure     Hernia     Hypercapnic respiratory failure, chronic 11/16/2016    Hyperlipidemia     Hypertension     Iron deficiency anemia 2/3/2016    Myocardial infarction     Obesity     Peripheral vascular disease     Pneumonia     Polyneuropathy     Retinal detachment     OS    Septic shock 4/23/2017    Skin ulcer  3/18/2017    Tobacco dependence     Type II 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, not stated as uncontrolled      Past Surgical History:   Procedure Laterality Date    CATARACT EXTRACTION Left     OS      SECTION      x2    EYE SURGERY      HYSTERECTOMY      OOPHORECTOMY      one ovary conserved    RETINAL DETACHMENT SURGERY      buckle --OS    TONSILLECTOMY       Family History     Problem Relation (Age of Onset)    Alcohol abuse Mother    Arthritis Father, Sister    Blindness Son    Cancer Brother    Crohn's disease Sister    Early death Sister (30)    Heart disease Father, Sister (32), Sister    No Known Problems Brother, Daughter        Social History Main Topics    Smoking status: Former Smoker     Packs/day: 0.25     Years: 50.00     Types: Cigarettes     Start date: 1968     Quit date: 2015    Smokeless tobacco: Former User     Quit date: 2017      Comment: 1 ppd for 20 years    Alcohol use No    Drug use: No    Sexual activity: Not Currently     Review of Systems   Constitutional: Positive for fatigue and fever. Negative for appetite change, chills, diaphoresis and unexpected weight change.   HENT: Negative for hearing loss, sore throat, trouble swallowing and voice change.    Eyes: Negative for visual disturbance.   Respiratory: Negative for cough, shortness of breath and wheezing.    Cardiovascular: Negative for chest pain, palpitations and leg swelling.   Gastrointestinal: Negative for abdominal distention, abdominal pain, anal bleeding, blood in stool, constipation, diarrhea, nausea, rectal pain and vomiting.   Genitourinary: Negative for difficulty urinating, dysuria, flank pain, frequency, hematuria, menstrual problem and urgency.   Musculoskeletal: Negative for arthralgias, back pain, joint swelling, myalgias and neck pain.   Skin: Positive for color change, rash and wound. Negative for pallor.   Neurological: Negative for  dizziness, syncope, weakness and headaches.   Hematological: Negative for adenopathy. Does not bruise/bleed easily.   Psychiatric/Behavioral: Negative for suicidal ideas. The patient is not nervous/anxious.      Objective:     Vital Signs (Most Recent):  Temp: 99.1 °F (37.3 °C) (04/23/17 0845)  Pulse: 86 (04/23/17 0940)  Resp: (!) 22 (04/23/17 0845)  BP: (!) 85/47 (04/23/17 0940)  SpO2: 98 % (04/23/17 0940) Vital Signs (24h Range):  Temp:  [97.1 °F (36.2 °C)-103.2 °F (39.6 °C)] 99.1 °F (37.3 °C)  Pulse:  [] 86  Resp:  [22-26] 22  SpO2:  [93 %-99 %] 98 %  BP: ()/(30-74) 85/47     Weight: (!) 149.4 kg (329 lb 5.9 oz)  Body mass index is 50.08 kg/(m^2).    Physical Exam   Constitutional: She is oriented to person, place, and time. She appears well-developed and well-nourished. No distress.   HENT:   Head: Normocephalic and atraumatic.   Right Ear: External ear normal.   Left Ear: External ear normal.   Eyes: Conjunctivae are normal. Pupils are equal, round, and reactive to light. Right eye exhibits no discharge. Left eye exhibits no discharge.   Neck: No tracheal deviation present. No thyromegaly present.   Cardiovascular: Normal rate and regular rhythm.    Pulmonary/Chest: Effort normal. No respiratory distress.   Musculoskeletal: She exhibits no edema or tenderness.   Lymphadenopathy:     She has no cervical adenopathy.   Neurological: She is alert and oriented to person, place, and time. No cranial nerve deficit.   Skin: Skin is warm and dry. No rash noted. She is not diaphoretic. No pallor.   See photo   Psychiatric: She has a normal mood and affect. Her behavior is normal. Judgment and thought content normal.   Nursing note and vitals reviewed.              Significant Labs:  CBC:   Recent Labs  Lab 04/23/17  0544   WBC 23.50*   RBC 4.56   HGB 11.7*   HCT 36.6*      MCV 80*   MCH 25.7*   MCHC 32.0     CMP:   Recent Labs  Lab 04/23/17  0544   *   CALCIUM 9.9   ALBUMIN 3.2*   PROT 7.4   NA  139   K 4.2   CO2 24      BUN 30*   CREATININE 1.0   ALKPHOS 98   ALT 25   AST 42*   BILITOT 0.5     Coagulation:   Recent Labs  Lab 04/23/17  0544   INR 3.9*       Significant Diagnostics:  I have reviewed all pertinent imaging results/findings within the past 24 hours.    Assessment/Plan:     Cellulitis, abdominal wall  Patient unable to fit in CT scanner.  Will get ultrasound of area to look for collection.  If surgical debridement needed, will need to reverse anticoagulation.    VTE Risk Mitigation     None          Thank you for your consult. I will follow-up with patient. Please contact us if you have any additional questions.    Sadi Wilson MD  General Surgery  [facility]

## 2017-04-23 NOTE — ASSESSMENT & PLAN NOTE
- continue low dose metoprolol  - coumadin held secondary to super therapeutic INR  - will monitor  - if hypotension becomes an issue with persistent afib, will consider amiodarone

## 2017-04-23 NOTE — ASSESSMENT & PLAN NOTE
Discuss weight-loss with diet and exercise and her habitus is danger danger to her health/well-being

## 2017-04-23 NOTE — PLAN OF CARE
04/23/17 0749   Patient Assessment/Suction   Level of Consciousness (AVPU) alert   Respiratory Effort Normal;Unlabored   All Lung Fields Breath Sounds diminished   PRE-TX-O2-ETCO2   O2 Device (Oxygen Therapy) nasal cannula   $ Is the patient on Oxygen? Yes   Flow (L/min) 3   SpO2 99 %   Pulse Oximetry Type Intermittent   $ Pulse Oximetry - Multiple Charge Pulse Oximetry - Multiple   Pulse 94   Resp (!) 24   Aerosol Therapy   $ Aerosol Therapy Charges Aerosol Treatment   Respiratory Treatment Status given   SVN/Inhaler Treatment Route mask   Position During Treatment HOB at 45 degrees   Patient Tolerance good   Post-Treatment   Post-treatment Heart Rate (beats/min) 98   Post-treatment Resp Rate (breaths/min) 24   All Fields Breath Sounds aeration increased   Pt assessed, no distress noted. Pt receives 1.25mg Xopenex Q6w/a, tols txs well.

## 2017-04-23 NOTE — ASSESSMENT & PLAN NOTE
Patient unable to fit in CT scanner.  Will get ultrasound of area to look for collection.  If surgical debridement needed, will need to reverse anticoagulation.

## 2017-04-23 NOTE — ASSESSMENT & PLAN NOTE
Continue Metoprolol, Lisinopril, Furosemide  Monitor I/O, daily weight  Monitor respiratory status, signs and symptoms of CHF

## 2017-04-23 NOTE — ASSESSMENT & PLAN NOTE
Likely from cellulitis, rule out abscess, bacteremia  Obtain blood for routine bacterial culture  Urine for routine bacterial culture  Broad-spectrum IV antibiotics - Zosyn, Linezolid  Consult surgeon to evaluate cellulitic area for ?abscess/debridement of infected tissue  Serial lactate  Trend WBC, temperature curve

## 2017-04-23 NOTE — ANESTHESIA PROCEDURE NOTES
Central Line, RIJ    Diagnosis: sepsis  Doctor requesting consult: aye  Patient location during procedure: ICU  Procedure start time: 4/23/2017 10:20 AM  Timeout: 4/23/2017 10:15 AM  Procedure end time: 4/23/2017 10:31 AM  Staffing  Anesthesiologist: QUENTIN JO  Performed by: anesthesiologist   Anesthesiologist was present at the time of the procedure.  Preanesthetic Checklist  Completed: patient identified, site marked, timeout performed, IV checked, risks and benefits discussed, monitors and equipment checked and anesthesia consent given  Indication  Indication: vascular access, med administration     Anesthesia   3 ml of  local infiltration and see MAR for details (2mg versed given during procedure)  Local Infiltration: lidocaine 1% without epinephrine    Central Line  Skin Prep: skin prepped with ChloraPrep, skin prep agent completely dried prior to procedure  maximum sterile barriers used during central venous catheter insertion  hand hygiene performed prior to central venous catheter insertion  Location: right external jugular,   Catheter type: triple lumen  Catheter Size: 7.5 Fr  Inserted Catheter Length: 16 cm  Ultrasound: vascular probe with ultrasound  Vessel Caliber: medium, patent  Vascular Doppler:  not done, compressibility normal  Needle advanced into vessel with real time Ultrasound guidance.  Guidewire confirmed in vessel.  Sterile sheath used.  Image recorded and saved.   Manometry: Venous cannualation confirmed by visual estimation of blood vessel pressure using manometry.  Insertion Attempts: 1   Securement:line sutured, chlorhexidine patch, sterile dressing applied and blood return through all ports     Post-Procedure  Adverse Events:none  Additional Notes  Xray pending

## 2017-04-23 NOTE — SUBJECTIVE & OBJECTIVE
Interval History: Patient seen and examined.  Still somnolent from overnight after getting benzos.  Febrile and mildly hypotensive at this time.  Pain at site of existing abdominal wound but otherwise denies any other complaints including dysuria, cough, diarrhea, headache, or any other related complaints.    Review of Systems   Constitutional: Positive for fatigue and fever. Negative for activity change and appetite change.   HENT: Negative.    Eyes: Negative.    Respiratory: Negative for chest tightness, shortness of breath and wheezing.    Cardiovascular: Negative for chest pain, palpitations and leg swelling.   Gastrointestinal: Negative for abdominal distention, abdominal pain, blood in stool, diarrhea and vomiting.   Genitourinary: Negative for dysuria and hematuria.   Skin: Positive for color change.        Erythema on left side of pannus with skin breakdown.  No purulence noted.   Neurological: Negative for headaches.   Hematological: Negative for adenopathy.   Psychiatric/Behavioral: Negative for confusion.     Objective:     Vital Signs (Most Recent):  Temp: 100.2 °F (37.9 °C) (04/23/17 0531)  Pulse: 94 (04/23/17 0749)  Resp: (!) 24 (04/23/17 0749)  BP: (!) 104/53 (04/23/17 0330)  SpO2: 99 % (04/23/17 0749) Vital Signs (24h Range):  Temp:  [97.1 °F (36.2 °C)-103.2 °F (39.6 °C)] 100.2 °F (37.9 °C)  Pulse:  [] 94  Resp:  [24-26] 24  SpO2:  [93 %-99 %] 99 %  BP: (104-127)/(53-74) 104/53     Weight: (!) 149.4 kg (329 lb 5.9 oz)  Body mass index is 50.08 kg/(m^2).    Intake/Output Summary (Last 24 hours) at 04/23/17 0811  Last data filed at 04/23/17 0656   Gross per 24 hour   Intake              760 ml   Output                0 ml   Net              760 ml      Physical Exam   Constitutional: She is oriented to person, place, and time. She appears well-developed and well-nourished. No distress.   somnolent   HENT:   Head: Normocephalic and atraumatic.   Eyes: Pupils are equal, round, and reactive to  light.   Neck: Neck supple. No thyromegaly present.   Cardiovascular: Normal rate and regular rhythm.  Exam reveals no gallop and no friction rub.    No murmur heard.  Pulmonary/Chest: Effort normal and breath sounds normal. No respiratory distress. She has no wheezes.   Abdominal: Soft. Bowel sounds are normal. She exhibits no distension. There is no tenderness. There is no guarding.   Musculoskeletal: Normal range of motion. She exhibits no edema.   Neurological: She is alert and oriented to person, place, and time.   Skin: Skin is warm and dry. No erythema.   Diffuse erythema with skin breakdown noted on left pannus.  TTP   Psychiatric: She has a normal mood and affect.       Significant Labs:   CBC:   Recent Labs  Lab 04/23/17  0038 04/23/17  0544   WBC 18.50* 23.50*   HGB 12.9 11.7*   HCT 40.6 36.6*    267       Significant Imaging: I have reviewed all pertinent imaging results/findings within the past 24 hours.

## 2017-04-23 NOTE — PLAN OF CARE
Pt transferred to ICU per Dr. Miles orders. Pt's bp 91/30. Hr 101. Temp 98.4.  Pt lethargic upon assessment.  Pt to ICU rm 517.  Report called to JUANITA Cline.

## 2017-04-23 NOTE — SUBJECTIVE & OBJECTIVE
Past Medical History:   Diagnosis Date    *Atrial flutter     Anxiety     Arthritis     Asthma     Atrial fibrillation     Back pain     Cataract     OD    CHF (congestive heart failure)     COPD (chronic obstructive pulmonary disease)     Depression     Diabetes mellitus     Emphysema of lung     Heart failure     Hernia     Hypercapnic respiratory failure, chronic 2016    Hyperlipidemia     Hypertension     Iron deficiency anemia 2/3/2016    Myocardial infarction     Obesity     Peripheral vascular disease     Pneumonia     Polyneuropathy     Retinal detachment     OS    Skin ulcer 3/18/2017    Tobacco dependence     Type II 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, not stated as uncontrolled        Past Surgical History:   Procedure Laterality Date    CATARACT EXTRACTION Left     OS      SECTION      x2    EYE SURGERY      HYSTERECTOMY      OOPHORECTOMY      one ovary conserved    RETINAL DETACHMENT SURGERY      buckle --OS    TONSILLECTOMY         Review of patient's allergies indicates:   Allergen Reactions    Dilaudid [hydromorphone] Anaphylaxis     Other reaction(s): Anaphylaxis  Other reaction(s): Unknown       No current facility-administered medications on file prior to encounter.      Current Outpatient Prescriptions on File Prior to Encounter   Medication Sig    albuterol (ACCUNEB) 0.63 mg/3 mL Nebu INHALE THE CONTENTS OF 1 VIAL  BY  NEBULIZER EVERY 6 HOURS AS NEEDED    albuterol-ipratropium 2.5mg-0.5mg/3mL (DUO-NEB) 0.5 mg-3 mg(2.5 mg base)/3 mL nebulizer solution INHALE THE CONTENTS OF 1 VIAL VIA NEBULIZER EVERY 6 HOURS AS NEEDED FOR  WHEEZING (DO NOT USE WITH ALBUTEROL INHALER)    ascorbic acid, vitamin C, (VITAMIN C) 500 MG tablet Take 1 tablet (500 mg total) by mouth every evening.    atorvastatin (LIPITOR) 20 MG tablet Take 1 tablet (20 mg total) by mouth once daily.    clonazePAM (KLONOPIN) 1 MG tablet Take 1  tablet (1 mg total) by mouth 2 (two) times daily as needed for Anxiety.    furosemide (LASIX) 40 MG tablet Take 1 tablet (40 mg total) by mouth 2 (two) times daily.    gabapentin (NEURONTIN) 600 MG tablet Take 1 tablet (600 mg total) by mouth 3 (three) times daily.    lisinopril (PRINIVIL,ZESTRIL) 20 MG tablet Take 0.5 tablets (10 mg total) by mouth once daily.    metformin (GLUCOPHAGE) 500 MG tablet TAKE 1 TABLET TWICE DAILY WITH MEALS    methocarbamol (ROBAXIN) 750 MG Tab TAKE 1 TABLET FOUR TIMES DAILY    metoprolol succinate (TOPROL-XL) 25 MG 24 hr tablet Take 0.5 tablets (12.5 mg total) by mouth once daily. On hold waiting for dr visit    miconazole NITRATE 2 % (MICOTIN) 2 % top powder Apply topically 2 (two) times daily. Skin folds    multivit, Ca, min-FA-soy isofl 400-60 mcg-mg Tab Take 1 tablet by mouth once daily.    multivitamin (THERAGRAN) tablet Take 1 tablet by mouth once daily.    polyethylene glycol (GLYCOLAX) 17 gram/dose powder MIX 17 GRAMS IN LIQUID AND DRINK EVERY DAY AS NEEDED    potassium chloride SA (K-DUR,KLOR-CON) 20 MEQ tablet Take 1 tablet (20 mEq total) by mouth once daily.    SANTYL ointment APPLY  OINTMENT TOPICALLY TO AFFECTED AREA ONCE DAILY (Patient taking differently: APPLY  OINTMENT TOPICALLY TO left thigh AFFECTED AREA ONCE DAILY)    SPIRIVA WITH HANDIHALER 18 mcg inhalation capsule INHALE THE CONTENTS OF 1 CAPSULE EVERY DAY    temazepam (RESTORIL) 30 mg capsule Take 1 capsule (30 mg total) by mouth nightly as needed.    VENTOLIN HFA 90 mcg/actuation inhaler INHALE TWO PUFFS BY MOUTH EVERY 6 HOURS AS NEEDED FOR WHEEZING    warfarin (COUMADIN) 5 MG tablet Take 0.5 tablets (2.5 mg total) by mouth Daily.    acetaminophen (TYLENOL) 325 MG tablet Take 2 tablets (650 mg total) by mouth every 6 (six) hours as needed.    budesonide (PULMICORT) 0.5 mg/2 mL nebulizer solution INHALE THE CONTENTS OF 1 VIAL VIA NEBULIZER EVERY DAY (Patient taking differently: INHALE THE  CONTENTS OF 1 VIAL VIA NEBULIZER Three times EVERY DAY as needed)    escitalopram oxalate (LEXAPRO) 20 MG tablet Take 1 tablet (20 mg total) by mouth once daily.    fluconazole (DIFLUCAN) 100 MG tablet Take 1 tablet (100 mg total) by mouth once daily.    hydrocodone-acetaminophen 10-325mg (NORCO)  mg Tab Take 1 tablet by mouth every 6 (six) hours as needed for Pain.    magnesium hydroxide 400 mg/5 ml (MILK OF MAGNESIA) 400 mg/5 mL Susp Take 30 mLs by mouth daily as needed.    magnesium oxide (MAG-OX) 400 mg tablet Take 1 tablet (400 mg total) by mouth 2 (two) times daily.     Family History     Problem Relation (Age of Onset)    Alcohol abuse Mother    Arthritis Father, Sister    Blindness Son    Cancer Brother    Crohn's disease Sister    Early death Sister (30)    Heart disease Father, Sister (32), Sister    No Known Problems Brother, Daughter        Social History Main Topics    Smoking status: Former Smoker     Packs/day: 0.25     Years: 50.00     Types: Cigarettes     Start date: 1/19/1968     Quit date: 9/19/2015    Smokeless tobacco: Former User     Quit date: 2/12/2017      Comment: 1 ppd for 20 years    Alcohol use No    Drug use: No    Sexual activity: Not Currently     Review of Systems   Unable to perform ROS: Other     Objective:     Vital Signs (Most Recent):  Temp: (!) 103.2 °F (39.6 °C) (04/23/17 0330)  Pulse: (!) 114 (04/23/17 0330)  Resp: (!) 26 (04/23/17 0330)  BP: (!) 104/53 (04/23/17 0330)  SpO2: 95 % (04/23/17 0330) Vital Signs (24h Range):  Temp:  [97.1 °F (36.2 °C)-103.2 °F (39.6 °C)] 103.2 °F (39.6 °C)  Pulse:  [103-141] 114  Resp:  [25-26] 26  SpO2:  [93 %-95 %] 95 %  BP: (104-127)/(53-74) 104/53     Weight: (!) 149.4 kg (329 lb 5.9 oz)  Body mass index is 50.08 kg/(m^2).    Physical Exam   Constitutional: She appears well-developed and well-nourished.   HENT:   Head: Normocephalic and atraumatic.   Nose: Nose normal.   Eyes: Right eye exhibits no discharge. Left eye  exhibits no discharge. No scleral icterus.   Neck: Neck supple. No JVD present.   Cardiovascular: An irregularly irregular rhythm present. Tachycardia present.    Pulmonary/Chest: Effort normal. She has no wheezes.   Abdominal: Bowel sounds are normal. She exhibits no distension. There is no rebound and no guarding.   Abdomen morbidly obese/ LEFT lower abdomen erythematous, drainage to gauze.   Musculoskeletal: She exhibits no edema or tenderness.   Neurological: She exhibits normal muscle tone.   Skin:   LEFT abdomen erythematous, warm, drainage.   Psychiatric: Her affect is not angry. She is not aggressive.   Nursing note and vitals reviewed.       Significant Labs:   Recent Lab Results       04/23/17  0050 04/23/17  0039 04/23/17  0038      Albumin   3.6     Alkaline Phosphatase   100     ALT   18     Anion Gap   16     Aniso   Slight     Appearance, UA Clear       AST   32     BANDS   3.0     Baso #   CANCELED  Comment:  Result canceled by the ancillary     Basophil%   0.0     Bilirubin (UA) Negative       Total Bilirubin   0.3  Comment:  For infants and newborns, interpretation of results should be based  on gestational age, weight and in agreement with clinical  observations.  Premature Infant recommended reference ranges:  Up to 24 hours.............<8.0 mg/dL  Up to 48 hours............<12.0 mg/dL  3-5 days..................<15.0 mg/dL  6-29 days.................<15.0 mg/dL       BUN, Bld   24(H)     Calcium   10.3     Chloride   99     CO2   25     Color, UA Yellow       Creatinine   0.9     Differential Method   Manual     eGFR if    >60     eGFR if non    >60  Comment:  Calculation used to obtain the estimated glomerular filtration  rate (eGFR) is the CKD-EPI equation. Since race is unknown   in our information system, the eGFR values for   -American and Non--American patients are given   for each creatinine result.       Eos #   CANCELED  Comment:  Result  canceled by the ancillary     Eosinophil%   3.0     Glucose   141(H)     Glucose, UA Negative       Gran%   84.0(H)     Hematocrit   40.6     Hemoglobin   12.9     Coumadin Monitoring INR  4.2  Comment:  Coumadin Therapy:  2.0 - 3.0 for INR for all indicators except mechanical heart valves  and antiphospholipid syndromes which should use 2.5 - 3.5.  (H)      Ketones, UA Negative       Leukocytes, UA Negative       Lymph #   CANCELED  Comment:  Result canceled by the ancillary     Lymph%   3.0(L)     MCH   25.5(L)     MCHC   31.7(L)     MCV   81(L)     Mono #   CANCELED  Comment:  Result canceled by the ancillary     Mono%   7.0     MPV   8.9(L)     Nitrite, UA Negative       Occult Blood UA Trace(A)       Ovalocytes   Occasional     pH, UA 6.0       Platelet Estimate   Appears normal     Platelets   321     Poik   Slight     Poly   Occasional     Potassium   5.7(H)     Total Protein   8.8(H)     Protein, UA Negative  Comment:  Recommend a 24 hour urine protein or a urine   protein/creatinine ratio if globulin induced proteinuria is  clinically suspected.         Protime  41.5(H)      RBC   5.04     RDW   17.6(H)     Sodium   140     Specific Gravity, UA 1.015       Specimen UA Urine, Clean Catch       Troponin I   <0.006  Comment:  The reference interval for Troponin I represents the 99th percentile   cutoff   for our facility and is consistent with 3rd generation assay   performance.       TSH  1.339      Urobilinogen, UA Negative       WBC   18.50(H)         All pertinent labs within the past 24 hours have been reviewed.

## 2017-04-24 ENCOUNTER — TELEPHONE (OUTPATIENT)
Dept: FAMILY MEDICINE | Facility: CLINIC | Age: 67
End: 2017-04-24

## 2017-04-24 PROBLEM — R65.20 SEVERE SEPSIS: Status: ACTIVE | Noted: 2017-04-05

## 2017-04-24 PROBLEM — E87.5 HYPERKALEMIA: Status: RESOLVED | Noted: 2017-04-23 | Resolved: 2017-04-24

## 2017-04-24 LAB
ALBUMIN SERPL BCP-MCNC: 2.6 G/DL
ALP SERPL-CCNC: 81 U/L
ALT SERPL W/O P-5'-P-CCNC: 58 U/L
ANION GAP SERPL CALC-SCNC: 9 MMOL/L
AST SERPL-CCNC: 90 U/L
BACTERIA UR CULT: NO GROWTH
BASOPHILS NFR BLD: 0 %
BILIRUB SERPL-MCNC: 0.5 MG/DL
BUN SERPL-MCNC: 14 MG/DL
CALCIUM SERPL-MCNC: 8.9 MG/DL
CHLORIDE SERPL-SCNC: 101 MMOL/L
CO2 SERPL-SCNC: 28 MMOL/L
CREAT SERPL-MCNC: 0.7 MG/DL
DIFFERENTIAL METHOD: ABNORMAL
EOSINOPHIL NFR BLD: 1 %
ERYTHROCYTE [DISTWIDTH] IN BLOOD BY AUTOMATED COUNT: 19 %
EST. GFR  (AFRICAN AMERICAN): >60 ML/MIN/1.73 M^2
EST. GFR  (NON AFRICAN AMERICAN): >60 ML/MIN/1.73 M^2
GLUCOSE SERPL-MCNC: 109 MG/DL
HCT VFR BLD AUTO: 30.5 %
HGB BLD-MCNC: 9.7 G/DL
INR PPP: 2.7
LACTATE SERPL-SCNC: 0.9 MMOL/L
LYMPHOCYTES NFR BLD: 13 %
MAGNESIUM SERPL-MCNC: 2.5 MG/DL
MCH RBC QN AUTO: 25.7 PG
MCHC RBC AUTO-ENTMCNC: 31.7 %
MCV RBC AUTO: 81 FL
MONOCYTES NFR BLD: 7 %
NEUTROPHILS NFR BLD: 77 %
NEUTS BAND NFR BLD MANUAL: 2 %
PHOSPHATE SERPL-MCNC: 3.9 MG/DL
PLATELET # BLD AUTO: 249 K/UL
PMV BLD AUTO: 8.8 FL
POCT GLUCOSE: 134 MG/DL (ref 70–110)
POCT GLUCOSE: 151 MG/DL (ref 70–110)
POCT GLUCOSE: 155 MG/DL (ref 70–110)
POTASSIUM SERPL-SCNC: 3.9 MMOL/L
PROT SERPL-MCNC: 6.5 G/DL
PROTHROMBIN TIME: 27.4 SEC
RBC # BLD AUTO: 3.77 M/UL
SODIUM SERPL-SCNC: 138 MMOL/L
WBC # BLD AUTO: 19.1 K/UL

## 2017-04-24 PROCEDURE — 12000002 HC ACUTE/MED SURGE SEMI-PRIVATE ROOM

## 2017-04-24 PROCEDURE — 85007 BL SMEAR W/DIFF WBC COUNT: CPT

## 2017-04-24 PROCEDURE — 25000003 PHARM REV CODE 250: Performed by: PHYSICIAN ASSISTANT

## 2017-04-24 PROCEDURE — 83605 ASSAY OF LACTIC ACID: CPT

## 2017-04-24 PROCEDURE — 63600175 PHARM REV CODE 636 W HCPCS: Performed by: PHYSICIAN ASSISTANT

## 2017-04-24 PROCEDURE — 85027 COMPLETE CBC AUTOMATED: CPT

## 2017-04-24 PROCEDURE — 80053 COMPREHEN METABOLIC PANEL: CPT

## 2017-04-24 PROCEDURE — 25000003 PHARM REV CODE 250: Performed by: FAMILY MEDICINE

## 2017-04-24 PROCEDURE — 94640 AIRWAY INHALATION TREATMENT: CPT

## 2017-04-24 PROCEDURE — 83735 ASSAY OF MAGNESIUM: CPT

## 2017-04-24 PROCEDURE — 84100 ASSAY OF PHOSPHORUS: CPT

## 2017-04-24 PROCEDURE — 27000221 HC OXYGEN, UP TO 24 HOURS

## 2017-04-24 PROCEDURE — 94761 N-INVAS EAR/PLS OXIMETRY MLT: CPT

## 2017-04-24 PROCEDURE — 85610 PROTHROMBIN TIME: CPT

## 2017-04-24 PROCEDURE — 36415 COLL VENOUS BLD VENIPUNCTURE: CPT

## 2017-04-24 RX ORDER — LEVALBUTEROL 1.25 MG/.5ML
1.25 SOLUTION, CONCENTRATE RESPIRATORY (INHALATION)
Status: DISCONTINUED | OUTPATIENT
Start: 2017-04-24 | End: 2017-04-28 | Stop reason: HOSPADM

## 2017-04-24 RX ADMIN — PIPERACILLIN SODIUM AND TAZOBACTAM SODIUM 4.5 G: 4; .5 INJECTION, POWDER, LYOPHILIZED, FOR SOLUTION INTRAVENOUS at 02:04

## 2017-04-24 RX ADMIN — PIPERACILLIN SODIUM AND TAZOBACTAM SODIUM 4.5 G: 4; .5 INJECTION, POWDER, LYOPHILIZED, FOR SOLUTION INTRAVENOUS at 06:04

## 2017-04-24 RX ADMIN — LEVALBUTEROL 1.25 MG: 1.25 SOLUTION, CONCENTRATE RESPIRATORY (INHALATION) at 08:04

## 2017-04-24 RX ADMIN — IPRATROPIUM BROMIDE 0.5 MG: 0.5 SOLUTION RESPIRATORY (INHALATION) at 08:04

## 2017-04-24 RX ADMIN — CLONAZEPAM 1 MG: 1 TABLET ORAL at 10:04

## 2017-04-24 RX ADMIN — HYDROCODONE BITARTRATE AND ACETAMINOPHEN 1 TABLET: 10; 325 TABLET ORAL at 06:04

## 2017-04-24 RX ADMIN — GABAPENTIN 600 MG: 300 CAPSULE ORAL at 02:04

## 2017-04-24 RX ADMIN — LEVALBUTEROL 1.25 MG: 1.25 SOLUTION, CONCENTRATE RESPIRATORY (INHALATION) at 02:04

## 2017-04-24 RX ADMIN — LINEZOLID 600 MG: 600 INJECTION, SOLUTION INTRAVENOUS at 05:04

## 2017-04-24 RX ADMIN — Medication 400 MG: at 10:04

## 2017-04-24 RX ADMIN — METHOCARBAMOL 750 MG: 750 TABLET ORAL at 06:04

## 2017-04-24 RX ADMIN — HYDROCODONE BITARTRATE AND ACETAMINOPHEN 1 TABLET: 10; 325 TABLET ORAL at 12:04

## 2017-04-24 RX ADMIN — LEVALBUTEROL 1.25 MG: 1.25 SOLUTION, CONCENTRATE RESPIRATORY (INHALATION) at 07:04

## 2017-04-24 RX ADMIN — HYDROCODONE BITARTRATE AND ACETAMINOPHEN 1 TABLET: 10; 325 TABLET ORAL at 04:04

## 2017-04-24 RX ADMIN — GABAPENTIN 600 MG: 300 CAPSULE ORAL at 10:04

## 2017-04-24 RX ADMIN — Medication 400 MG: at 12:04

## 2017-04-24 RX ADMIN — FLUCONAZOLE 100 MG: 100 TABLET ORAL at 09:04

## 2017-04-24 RX ADMIN — GABAPENTIN 600 MG: 300 CAPSULE ORAL at 06:04

## 2017-04-24 RX ADMIN — PIPERACILLIN SODIUM AND TAZOBACTAM SODIUM 4.5 G: 4; .5 INJECTION, POWDER, LYOPHILIZED, FOR SOLUTION INTRAVENOUS at 10:04

## 2017-04-24 RX ADMIN — METOPROLOL SUCCINATE 12.5 MG: 25 TABLET, EXTENDED RELEASE ORAL at 09:04

## 2017-04-24 RX ADMIN — ATORVASTATIN CALCIUM 20 MG: 20 TABLET, FILM COATED ORAL at 09:04

## 2017-04-24 RX ADMIN — METHOCARBAMOL 750 MG: 750 TABLET ORAL at 05:04

## 2017-04-24 RX ADMIN — POTASSIUM CHLORIDE 20 MEQ: 1500 TABLET, EXTENDED RELEASE ORAL at 09:04

## 2017-04-24 RX ADMIN — ZINC SULFATE 220 MG (50 MG) CAPSULE 220 MG: CAPSULE at 09:04

## 2017-04-24 RX ADMIN — METHOCARBAMOL 750 MG: 750 TABLET ORAL at 12:04

## 2017-04-24 RX ADMIN — IPRATROPIUM BROMIDE 0.5 MG: 0.5 SOLUTION RESPIRATORY (INHALATION) at 07:04

## 2017-04-24 RX ADMIN — METHOCARBAMOL 750 MG: 750 TABLET ORAL at 10:04

## 2017-04-24 RX ADMIN — IPRATROPIUM BROMIDE 0.5 MG: 0.5 SOLUTION RESPIRATORY (INHALATION) at 02:04

## 2017-04-24 NOTE — PLAN OF CARE
Problem: Patient Care Overview  Goal: Plan of Care Review  Outcome: Ongoing (interventions implemented as appropriate)  Pt on 5L NC with Q6 zopenx and atrovent.

## 2017-04-24 NOTE — PLAN OF CARE
"   04/24/17 1220   Readmission Questionnaire   At the time of your discharge, did someone talk to you about what your health problems were? Yes   At the time of discharge, did someone talk to you about what to watch out for regarding worsening of your health problem? Yes   At the time of discharge, did someone talk to you about what to do if you experienced worsening of your health problem? Yes   At the time of discharge, did someone talk to you about which medication to take when you left the hospital and which ones to stop taking? Yes   At the time of discharge, did someone talk to you about when and where to follow up with a doctor after you left the hospital? Yes   What do you believe caused you to be sick enough to be re-admitted? (fever and infection per pt)   How often do you need to have someone help you when you read instructions, pamphlets, or other written material from your doctor or pharmacy? Sometimes   Do you have problems taking your medications as prescribed? Yes  ("sometimes but my daughter always helps me")   Do you have problems obtaining/receiving your medications? No   Does the patient have transportation to healthcare appointments? Yes   Lives With child(bonyn), adult;grandchild(bonny);other (see comments)  (daughter Maye, her fiancee and pt's 19 y/o granddaughter)   Living Arrangements mobile home   Does the patient have family/friends to help with healtcare needs after discharge? yes   Who are your caregiver(s) and their phone number(s)? (ilana Villavicencio, [phone])   Does your caregiver provide all the help you need? Yes   Are you currently feeling confused? No   Are you currently having problems thinking? No   Are you currently having memory problems? No   Have you felt down, depressed, or hopeless? 1   Have you felt little interest or pleasure in doing things? 1  (pt states that when she feels tired she has little interest in doing things)   In the last 7 days, my sleep quality was: good "

## 2017-04-24 NOTE — TELEPHONE ENCOUNTER
----- Message from Shira Joshi sent at 4/21/2017  2:13 PM CDT -----  Patient calling back to remind doctor to order Nebulizer machine/if any questions call back at [phone].

## 2017-04-24 NOTE — PLAN OF CARE
Met with pt to complete her assessment.  Pt, who needs assistance, lives with her daughter and daughter's family.  Pt verified her PCP as Dr. Bird, primary insurance as [employer] and secondary insurance as Medicaid.  Prior to hospitalization pt was receiving home health for nursing and wound care with [facility] and going to [facility] once a week for wound care.  Pt's tentative discharge plan is home with [facility] home health and weekly wound care at [facility].  Obtained signature for the disclosure form.      04/24/17 1225   Discharge Assessment   Assessment Type Discharge Planning Assessment   Confirmed/corrected address and phone number on facesheet? Yes   Assessment information obtained from? Patient   Communicated expected length of stay with patient/caregiver no   Type of Healthcare Directive Received Durable power of  for health care;Living will  (ilana Villavicencio is POA)   If Healthcare Directive is received, is it scanned into Epic? yes   Prior to hospitilization cognitive status: Alert/Oriented   Prior to hospitalization functional status: Assistive Equipment;Needs Assistance   Current cognitive status: Alert/Oriented   Current Functional Status: Assistive Equipment;Needs Assistance   Arrived From home health  ([facility])   Lives With child(bonny), adult;grandchild(bonny);other (see comments)  (ilana Villavicencio, Maye's fiancee and 17 y/o daughter)   Able to Return to Prior Arrangements yes   Is patient able to care for self after discharge? Yes  (with daughters assistance)   Does the patient have family/friends to help with healtcare needs after discharge? yes   How many people do you have in your home that can help with your care after discharge? 1   Who are your caregiver(s) and their phone number(s)? (ilana Villavicencio, [phone])   Patient's perception of discharge disposition home health   Readmission Within The Last 30 Days previous discharge plan unsuccessful   If YES, was patient admitted for the same reason? Yes  "  Patient currently being followed by outpatient case management? Yes   If yes, name of outpatient case management following: [facility]  (pt thinks her name is Jayde)   Patient currently receives home health services? Yes  ([facility])   Patient previously received home health services and would like to resume services if necessary? Yes   If yes, name of home health provider: ([facility])   Does the patient currently use HME? Yes   Patient currently receives any other outside agency services? (wound care at [facility] once a week on Thursday)   Equipment Currently Used at Home bedside commode;cane, straight;rollator;oxygen;power chair  (nebulizer, BP cuff, pulse ox)   Do you have any problems affording any of your prescribed medications? Yes  (":sometimes" but daughter always helps.  Pharmacy is [facility]   Is the patient taking medications as prescribed? yes   Do you have any financial concerns preventing you from receiving the healthcare you need? Other (comments)  (sometimes prescriptions are expensive but daughter always helps)   Does the patient have transportation to healthcare appointments? Yes   Transportation Available family or friend will provide   On Dialysis? No   Does the patient receive services at the [facility] Clinic? (home health nurse draws the INR and results are sent to Dr. Bird at the [facility])   Discharge Plan A Home Health   Patient/Family In Agreement With Plan yes     "

## 2017-04-24 NOTE — NURSING
Patient received to unit in NAD; IV abx just finished. Patient in NAD, VSS. Bed in lowest position, SR raised, wheels locked, call light in reach. Patient c/o 8/10 pain; PRN pain med administered according to MD order. Will pass on report to night nurse.

## 2017-04-24 NOTE — TELEPHONE ENCOUNTER
----- Message from Ana Garcia sent at 4/24/2017 10:48 AM CDT -----  Contact: [facility]  Patient is in the hospital. She has refused twice for labs. She said she is icu since Saturday at [facility]. Call [phone] thanks!

## 2017-04-24 NOTE — PLAN OF CARE
Problem: Patient Care Overview  Goal: Plan of Care Review  Outcome: Ongoing (interventions implemented as appropriate)  Patient VSS, afebrile, no complication or distress noted through shift. Patient has mild episodes of HOTN while sleeping, but remains asymptomatic. IVF and Abx continued. Likely to floor today.     All safety measures in place. Will continue to monitor.

## 2017-04-24 NOTE — PROGRESS NOTES
Pt transferred to U room 209 via w/c, NAD noted. Belongings at BS. Bella GRANT at BS to receive pt.    Domenico Davis RN

## 2017-04-25 LAB
ALBUMIN SERPL BCP-MCNC: 2.6 G/DL
ALP SERPL-CCNC: 67 U/L
ALT SERPL W/O P-5'-P-CCNC: 93 U/L
ANION GAP SERPL CALC-SCNC: 6 MMOL/L
AST SERPL-CCNC: 97 U/L
BASOPHILS # BLD AUTO: 0.1 K/UL
BASOPHILS NFR BLD: 0.8 %
BILIRUB SERPL-MCNC: 0.4 MG/DL
BUN SERPL-MCNC: 9 MG/DL
CALCIUM SERPL-MCNC: 9.1 MG/DL
CHLORIDE SERPL-SCNC: 101 MMOL/L
CO2 SERPL-SCNC: 30 MMOL/L
CREAT SERPL-MCNC: 0.7 MG/DL
DIFFERENTIAL METHOD: ABNORMAL
EOSINOPHIL # BLD AUTO: 0.4 K/UL
EOSINOPHIL NFR BLD: 3.4 %
ERYTHROCYTE [DISTWIDTH] IN BLOOD BY AUTOMATED COUNT: 18.7 %
EST. GFR  (AFRICAN AMERICAN): >60 ML/MIN/1.73 M^2
EST. GFR  (NON AFRICAN AMERICAN): >60 ML/MIN/1.73 M^2
GLUCOSE SERPL-MCNC: 117 MG/DL
HCT VFR BLD AUTO: 29.6 %
HGB BLD-MCNC: 9.2 G/DL
INR PPP: 2.7
LYMPHOCYTES # BLD AUTO: 1.4 K/UL
LYMPHOCYTES NFR BLD: 12.1 %
MAGNESIUM SERPL-MCNC: 2 MG/DL
MCH RBC QN AUTO: 25.5 PG
MCHC RBC AUTO-ENTMCNC: 31.3 %
MCV RBC AUTO: 82 FL
MONOCYTES # BLD AUTO: 1.3 K/UL
MONOCYTES NFR BLD: 11.2 %
NEUTROPHILS # BLD AUTO: 8.5 K/UL
NEUTROPHILS NFR BLD: 72.5 %
PHOSPHATE SERPL-MCNC: 3.1 MG/DL
PLATELET # BLD AUTO: 243 K/UL
PMV BLD AUTO: 9 FL
POCT GLUCOSE: 120 MG/DL (ref 70–110)
POCT GLUCOSE: 123 MG/DL (ref 70–110)
POCT GLUCOSE: 131 MG/DL (ref 70–110)
POTASSIUM SERPL-SCNC: 4.2 MMOL/L
PROT SERPL-MCNC: 6.7 G/DL
PROTHROMBIN TIME: 27.5 SEC
RBC # BLD AUTO: 3.63 M/UL
SODIUM SERPL-SCNC: 137 MMOL/L
WBC # BLD AUTO: 11.7 K/UL

## 2017-04-25 PROCEDURE — 36415 COLL VENOUS BLD VENIPUNCTURE: CPT

## 2017-04-25 PROCEDURE — 12000002 HC ACUTE/MED SURGE SEMI-PRIVATE ROOM

## 2017-04-25 PROCEDURE — 84100 ASSAY OF PHOSPHORUS: CPT

## 2017-04-25 PROCEDURE — 25000003 PHARM REV CODE 250: Performed by: FAMILY MEDICINE

## 2017-04-25 PROCEDURE — 63600175 PHARM REV CODE 636 W HCPCS: Performed by: PHYSICIAN ASSISTANT

## 2017-04-25 PROCEDURE — 94640 AIRWAY INHALATION TREATMENT: CPT

## 2017-04-25 PROCEDURE — 85025 COMPLETE CBC W/AUTO DIFF WBC: CPT

## 2017-04-25 PROCEDURE — 25000003 PHARM REV CODE 250: Performed by: INTERNAL MEDICINE

## 2017-04-25 PROCEDURE — 83735 ASSAY OF MAGNESIUM: CPT

## 2017-04-25 PROCEDURE — 25000003 PHARM REV CODE 250: Performed by: PHYSICIAN ASSISTANT

## 2017-04-25 PROCEDURE — 80053 COMPREHEN METABOLIC PANEL: CPT

## 2017-04-25 PROCEDURE — 27000221 HC OXYGEN, UP TO 24 HOURS

## 2017-04-25 PROCEDURE — 94761 N-INVAS EAR/PLS OXIMETRY MLT: CPT

## 2017-04-25 PROCEDURE — 85610 PROTHROMBIN TIME: CPT

## 2017-04-25 RX ORDER — MICONAZOLE NITRATE 2 %
POWDER (GRAM) TOPICAL 2 TIMES DAILY
Status: DISCONTINUED | OUTPATIENT
Start: 2017-04-25 | End: 2017-04-28 | Stop reason: HOSPADM

## 2017-04-25 RX ORDER — FUROSEMIDE 10 MG/ML
20 INJECTION INTRAMUSCULAR; INTRAVENOUS ONCE
Status: DISCONTINUED | OUTPATIENT
Start: 2017-04-25 | End: 2017-04-25

## 2017-04-25 RX ORDER — FUROSEMIDE 40 MG/1
40 TABLET ORAL 2 TIMES DAILY
Status: DISCONTINUED | OUTPATIENT
Start: 2017-04-26 | End: 2017-04-28 | Stop reason: HOSPADM

## 2017-04-25 RX ORDER — TEMAZEPAM 15 MG/1
30 CAPSULE ORAL NIGHTLY PRN
Status: DISCONTINUED | OUTPATIENT
Start: 2017-04-25 | End: 2017-04-28 | Stop reason: HOSPADM

## 2017-04-25 RX ADMIN — GABAPENTIN 600 MG: 300 CAPSULE ORAL at 08:04

## 2017-04-25 RX ADMIN — GABAPENTIN 600 MG: 300 CAPSULE ORAL at 05:04

## 2017-04-25 RX ADMIN — ESCITALOPRAM 10 MG: 10 TABLET, FILM COATED ORAL at 09:04

## 2017-04-25 RX ADMIN — LINEZOLID 600 MG: 600 INJECTION, SOLUTION INTRAVENOUS at 06:04

## 2017-04-25 RX ADMIN — PIPERACILLIN SODIUM AND TAZOBACTAM SODIUM 4.5 G: 4; .5 INJECTION, POWDER, LYOPHILIZED, FOR SOLUTION INTRAVENOUS at 01:04

## 2017-04-25 RX ADMIN — FLUCONAZOLE 100 MG: 100 TABLET ORAL at 09:04

## 2017-04-25 RX ADMIN — ZINC SULFATE 220 MG (50 MG) CAPSULE 220 MG: CAPSULE at 09:04

## 2017-04-25 RX ADMIN — POTASSIUM CHLORIDE 20 MEQ: 1500 TABLET, EXTENDED RELEASE ORAL at 09:04

## 2017-04-25 RX ADMIN — GABAPENTIN 600 MG: 300 CAPSULE ORAL at 01:04

## 2017-04-25 RX ADMIN — Medication 400 MG: at 08:04

## 2017-04-25 RX ADMIN — LEVALBUTEROL 1.25 MG: 1.25 SOLUTION, CONCENTRATE RESPIRATORY (INHALATION) at 08:04

## 2017-04-25 RX ADMIN — LEVALBUTEROL 1.25 MG: 1.25 SOLUTION, CONCENTRATE RESPIRATORY (INHALATION) at 01:04

## 2017-04-25 RX ADMIN — METOPROLOL SUCCINATE 12.5 MG: 25 TABLET, EXTENDED RELEASE ORAL at 09:04

## 2017-04-25 RX ADMIN — METHOCARBAMOL 750 MG: 750 TABLET ORAL at 05:04

## 2017-04-25 RX ADMIN — Medication 400 MG: at 09:04

## 2017-04-25 RX ADMIN — ATORVASTATIN CALCIUM 20 MG: 20 TABLET, FILM COATED ORAL at 09:04

## 2017-04-25 RX ADMIN — HYDROCODONE BITARTRATE AND ACETAMINOPHEN 1 TABLET: 10; 325 TABLET ORAL at 08:04

## 2017-04-25 RX ADMIN — IPRATROPIUM BROMIDE 0.5 MG: 0.5 SOLUTION RESPIRATORY (INHALATION) at 08:04

## 2017-04-25 RX ADMIN — CLONAZEPAM 1 MG: 1 TABLET ORAL at 08:04

## 2017-04-25 RX ADMIN — METHOCARBAMOL 750 MG: 750 TABLET ORAL at 11:04

## 2017-04-25 RX ADMIN — Medication: at 10:04

## 2017-04-25 RX ADMIN — METHOCARBAMOL 750 MG: 750 TABLET ORAL at 06:04

## 2017-04-25 RX ADMIN — PIPERACILLIN SODIUM AND TAZOBACTAM SODIUM 4.5 G: 4; .5 INJECTION, POWDER, LYOPHILIZED, FOR SOLUTION INTRAVENOUS at 06:04

## 2017-04-25 RX ADMIN — HYDROCODONE BITARTRATE AND ACETAMINOPHEN 1 TABLET: 10; 325 TABLET ORAL at 09:04

## 2017-04-25 RX ADMIN — PIPERACILLIN SODIUM AND TAZOBACTAM SODIUM 4.5 G: 4; .5 INJECTION, POWDER, LYOPHILIZED, FOR SOLUTION INTRAVENOUS at 09:04

## 2017-04-25 RX ADMIN — LINEZOLID 600 MG: 600 INJECTION, SOLUTION INTRAVENOUS at 05:04

## 2017-04-25 RX ADMIN — SODIUM CHLORIDE: 0.9 INJECTION, SOLUTION INTRAVENOUS at 04:04

## 2017-04-25 RX ADMIN — ACETAMINOPHEN 650 MG: 325 TABLET, FILM COATED ORAL at 07:04

## 2017-04-25 RX ADMIN — IPRATROPIUM BROMIDE 0.5 MG: 0.5 SOLUTION RESPIRATORY (INHALATION) at 01:04

## 2017-04-25 NOTE — PROGRESS NOTES
04/25/17 0816   Patient Assessment/Suction   Level of Consciousness (AVPU) alert   Respiratory Effort Normal   All Lung Fields Breath Sounds diminished   Cough Type good;nonproductive   PRE-TX-O2-ETCO2   O2 Device (Oxygen Therapy) nasal cannula   $ Is the patient on Oxygen? Yes   Flow (L/min) 2   SpO2 (!) 94 %   Pulse Oximetry Type Intermittent   $ Pulse Oximetry - Multiple Charge Pulse Oximetry - Multiple   Pulse 73   Resp 16   Aerosol Therapy   $ Aerosol Therapy Charges Aerosol Treatment   Respiratory Treatment Status given   SVN/Inhaler Treatment Route mask   Position During Treatment HOB at 90 degrees   Patient Tolerance good   Post-Treatment   Post-treatment Heart Rate (beats/min) 75   Post-treatment Resp Rate (breaths/min) 16   All Fields Breath Sounds unchanged   Xopenex 1.25, Atrovent Q6w/a, NC>92, vitals as charted.

## 2017-04-25 NOTE — PLAN OF CARE
04/24/17 2008   Patient Assessment/Suction   Level of Consciousness (AVPU) alert   Respiratory Effort Normal   Expansion/Accessory Muscles/Retractions expansion symmetric   All Lung Fields Breath Sounds diminished   Rhythm/Pattern, Respiratory pattern regular   Cough Frequency infrequent   Cough Type good;nonproductive   Is this patient in SNF or Inpatient Rehab? No   PRE-TX-O2-ETCO2   O2 Device (Oxygen Therapy) nasal cannula   $ Is the patient on Oxygen? Yes   Flow (L/min) 2   SpO2 99 %   Pulse Oximetry Type Intermittent   $ Pulse Oximetry - Multiple Charge Pulse Oximetry - Multiple   Pulse 79   Resp 16   Positioning HOB elevated 30 degrees   Aerosol Therapy   $ Aerosol Therapy Charges Aerosol Treatment   Respiratory Treatment Status given   SVN/Inhaler Treatment Route mask   Position During Treatment HOB at 30 degrees   Patient Tolerance good   Post-Treatment   Post-treatment Heart Rate (beats/min) 77   Post-treatment Resp Rate (breaths/min) 16   All Fields Breath Sounds unchanged   Equipment Change   $ RT Equipment nasal cannula (1-5 liters)   O2 Equipment Changed? Yes   O2 Equipment Change Next Due 05/01/18

## 2017-04-25 NOTE — PLAN OF CARE
Known to wound care from previous admits.  Left lower abdominal chronic wound with surrounding erythema and drainage.  Nursing recommendations written.

## 2017-04-25 NOTE — ASSESSMENT & PLAN NOTE
Stable. Likely from cellulitis. Rule out abscess, bacteremia- u/s negative. Bcx negative thus far.   Broad-spectrum IV antibiotics - Zosyn, Linezolid. ID consulted. Line of demarcation drawn on abdomen.  Surgeon consulted for evaluation. No surgical intervention warranted at this time.  Monitor vitals

## 2017-04-25 NOTE — SUBJECTIVE & OBJECTIVE
Interval History: uneventful night.     Review of Systems   Constitutional: Negative for chills and fever.   Respiratory: Negative for shortness of breath.    Cardiovascular: Negative for chest pain.   Gastrointestinal: Negative for abdominal pain.   Psychiatric/Behavioral: Negative for confusion.     Objective:     Vital Signs (Most Recent):  Temp: 98.6 °F (37 °C) (04/25/17 0900)  Pulse: 82 (04/25/17 0900)  Resp: 18 (04/25/17 0900)  BP: (!) 109/48 (04/25/17 0900)  SpO2: (!) 82 % (04/25/17 0900) Vital Signs (24h Range):  Temp:  [97.4 °F (36.3 °C)-98.7 °F (37.1 °C)] 98.6 °F (37 °C)  Pulse:  [62-97] 82  Resp:  [16-22] 18  SpO2:  [82 %-100 %] 82 %  BP: (109-128)/(44-60) 109/48     Weight: (!) 149.4 kg (329 lb 5.9 oz)  Body mass index is 50.08 kg/(m^2).    Intake/Output Summary (Last 24 hours) at 04/25/17 1324  Last data filed at 04/25/17 0524   Gross per 24 hour   Intake              900 ml   Output             1500 ml   Net             -600 ml      Physical Exam   Constitutional: She appears well-developed and well-nourished. No distress.   HENT:   Head: Normocephalic and atraumatic.   Eyes: Conjunctivae are normal.   Neck: Neck supple. No JVD present.   Cardiovascular: Normal rate, regular rhythm and normal heart sounds.    No murmur heard.  Pulmonary/Chest: Effort normal and breath sounds normal. No respiratory distress.   Abdominal: Bowel sounds are normal. There is no tenderness. A hernia is present.   Large hernia pannus of the L abdomen with several stage III sores with surrounding cellulitis   Musculoskeletal: She exhibits no edema.   Neurological: She is alert.   Skin: There is erythema.   Psychiatric: She has a normal mood and affect. Her behavior is normal.   Vitals reviewed.      Significant Labs: All pertinent labs within the past 24 hours have been reviewed.    Significant Imaging: none

## 2017-04-25 NOTE — ASSESSMENT & PLAN NOTE
- continue low dose metoprolol  - continue to hold coumadin until INR within range. Currently down to 2.7 (4.2). Will resume when it decreases to less than 2.5.   - telemetry

## 2017-04-25 NOTE — NURSING
"Patient's family present, assisted patient with shower using shower chair. Wound dressing removed prior to shower; area surrounding wound reddened (blanchable) d/t cellulitis, wound area open. Patient's daughter states, "I change her dressing all the time, my mom is very anxious about who messes with it, I want to change it." Daughter provided with supplies, bandage changed. Telemetry monitor reapplied. Patient resting with bed in lowest position, wheels locked, SR raised, call light in reach. Will cont to monitor.   "

## 2017-04-25 NOTE — ASSESSMENT & PLAN NOTE
- continue low dose metoprolol  - continue to hold coumadin until INR within range. Currently down to 2.7 (4.2)  - telemetry

## 2017-04-25 NOTE — PROGRESS NOTES
[facility] Hospital Medicine  Progress Note    Patient Name: Nelly Tian  MRN: [id number]  Patient Class: IP- Inpatient   Admission Date: 4/22/2017  Length of Stay: 1 days  Attending Physician: Saba Mora MD  Primary Care Provider: Constantine Bird MD    Subjective:     Principal Problem:Severe sepsis    HPI:  Patient presents for evaluation of anxiety. She is a poor historian. She received anxiolytic prior to my interview and exam. She reports feeling anxious but can't provide time of onset. She reports using nebulized bronchodilators routinely. She reports chronic shortness of breath and states she uses supplemental oxygen at home continuously. She is unable to say if her shortness of breath has worsened. She denies peripheral edema. She reports using 3-4 pillows to sleep. She denies increased orthopnea. She reports having LEFT-sided wound which is cared for by home healthcare. She reports antibiotic regimen being finished. She denies fever or chills. She denies cough, sore throat. She denies dysuria, flank pain. She denies chest pain.    She was evaluated in the ED. She was administered anxiolytic. She was found to be in atrial fibrillation with RVR. She was administered IV diltiazem which improved her heart rate. She was admitted to telemetry unit. She became febrile. Blood culture obtained. IV Linezolid, Zosyn administered.      Hospital Course:  Pt transferred to ICU after she became hypotensive and febrile. She did not require pressors and was observed overnight in the ICU.     Interval History: uneventful night. Daughter at bedside with concerns about the cellulitis. She says the erythema currently present was not there on admission. She is also concerned about the sores on her abdomen- they have been taking too long to heal.     Review of Systems   Constitutional: Negative for chills and fever.   Respiratory: Negative for shortness of breath.    Cardiovascular: Negative for  chest pain.   Gastrointestinal: Positive for abdominal pain (where her sores are).   Psychiatric/Behavioral: Negative for confusion.     Objective:     Vital Signs (Most Recent):  Temp: 98.6 °F (37 °C) (04/24/17 2003)  Pulse: 79 (04/24/17 2008)  Resp: 16 (04/24/17 2008)  BP: (!) 116/44 (04/24/17 2003)  SpO2: 99 % (04/24/17 2008) Vital Signs (24h Range):  Temp:  [97.4 °F (36.3 °C)-98.6 °F (37 °C)] 98.6 °F (37 °C)  Pulse:  [62-97] 79  Resp:  [16-20] 16  SpO2:  [79 %-100 %] 99 %  BP: ()/(40-60) 116/44     Weight: (!) 149.4 kg (329 lb 5.9 oz)  Body mass index is 50.08 kg/(m^2).    Intake/Output Summary (Last 24 hours) at 04/24/17 2106  Last data filed at 04/24/17 1717   Gross per 24 hour   Intake          1881.25 ml   Output             2850 ml   Net          -968.75 ml      Physical Exam   Constitutional: She appears well-developed and well-nourished. No distress.   HENT:   Head: Normocephalic and atraumatic.   Eyes: Conjunctivae are normal.   Neck: Neck supple. No JVD present.   Cardiovascular: Normal rate, regular rhythm and normal heart sounds.    No murmur heard.  Pulmonary/Chest: Effort normal and breath sounds normal. No respiratory distress.   Abdominal: Bowel sounds are normal. There is tenderness in the left lower quadrant. A hernia is present.   Large hernia pannus of the L abdomen with several stage III sores with surrounding cellulitis   Musculoskeletal: She exhibits no edema.   Neurological: She is alert.   Psychiatric: She has a normal mood and affect. Her behavior is normal.   Vitals reviewed.      Significant Labs: All pertinent labs within the past 24 hours have been reviewed.    Significant Imaging: I have reviewed all pertinent imaging results/findings within the past 24 hours.    Assessment/Plan:      * Severe sepsis  Stable. Likely from cellulitis. Rule out abscess, bacteremia- u/s negative. Bcx negative thus far.   Broad-spectrum IV antibiotics - Zosyn, Linezolid. ID consulted. Line of  demarcation drawn on abdomen.  Surgeon consulted for evaluation. No surgical intervention warranted at this time.  Monitor vitals          Major depression, recurrent, chronic  Stable. Continue Escitalopram      Chronic atrial fibrillation  - continue low dose metoprolol  - continue to hold coumadin until INR within range. Currently down to 2.7 (4.2)  - telemetry    Hypertension associated with diabetes  Resume home meds, titrating and adjusting as needed.   Stable.     Chronic combined systolic and diastolic congestive heart failure  Stable. Monitor daily for signs/ symptoms.    Type 2 diabetes mellitus with complication, without long-term current use of insulin  Stable. Check BG prior to meals and QHS. Administer rapid-acting insulin according to low-dose sliding scale  Hold oral antihyperglymics        Hyperlipidemia  Continue Atorvastatin      Obesity, Class III, BMI 40-49.9 (morbid obesity)  Body mass index is 50.08 kg/(m^2). Morbid obesity complicates all aspects of disease management from diagnostic modalities to treatment. Weight loss encouraged and health benefits explained to patient.        Cellulitis, abdominal wall  Continue antibiotics. ID to evaluate patient. Wound care consulted.       Chronic respiratory failure  Continue supplemental oxygen as routine home use.      Hyperkalemia, resolved as of 4/24/2017  - monitor after fluid       VTE Risk Mitigation     None          Saba Mora MD  Department of Hospital Medicine   [facility]

## 2017-04-25 NOTE — PROGRESS NOTES
[facility] Hospital Medicine  Progress Note    Patient Name: Nelly Tian  MRN: [id number]  Patient Class: IP- Inpatient   Admission Date: 4/22/2017  Length of Stay: 2 days  Attending Physician: Saba Mora MD  Primary Care Provider: Constantine Bird MD    Subjective:     Principal Problem:Severe sepsis    HPI:  Patient presents for evaluation of anxiety. She is a poor historian. She received anxiolytic prior to my interview and exam. She reports feeling anxious but can't provide time of onset. She reports using nebulized bronchodilators routinely. She reports chronic shortness of breath and states she uses supplemental oxygen at home continuously. She is unable to say if her shortness of breath has worsened. She denies peripheral edema. She reports using 3-4 pillows to sleep. She denies increased orthopnea. She reports having LEFT-sided wound which is cared for by home healthcare. She reports antibiotic regimen being finished. She denies fever or chills. She denies cough, sore throat. She denies dysuria, flank pain. She denies chest pain.    She was evaluated in the ED. She was administered anxiolytic. She was found to be in atrial fibrillation with RVR. She was administered IV diltiazem which improved her heart rate. She was admitted to telemetry unit. She became febrile. Blood culture obtained. IV Linezolid, Zosyn administered.      Hospital Course:  Pt transferred to ICU after she became hypotensive and febrile. She did not require pressors. She was observed overnight in the ICU. ID consulted for assistance with antibiotic regimen.     Interval History: uneventful night.     Review of Systems   Constitutional: Negative for chills and fever.   Respiratory: Negative for shortness of breath.    Cardiovascular: Negative for chest pain.   Gastrointestinal: Negative for abdominal pain.   Psychiatric/Behavioral: Negative for confusion.     Objective:     Vital Signs (Most Recent):  Temp: 98.6  °F (37 °C) (04/25/17 0900)  Pulse: 82 (04/25/17 0900)  Resp: 18 (04/25/17 0900)  BP: (!) 109/48 (04/25/17 0900)  SpO2: (!) 82 % (04/25/17 0900) Vital Signs (24h Range):  Temp:  [97.4 °F (36.3 °C)-98.7 °F (37.1 °C)] 98.6 °F (37 °C)  Pulse:  [62-97] 82  Resp:  [16-22] 18  SpO2:  [82 %-100 %] 82 %  BP: (109-128)/(44-60) 109/48     Weight: (!) 149.4 kg (329 lb 5.9 oz)  Body mass index is 50.08 kg/(m^2).    Intake/Output Summary (Last 24 hours) at 04/25/17 1324  Last data filed at 04/25/17 0524   Gross per 24 hour   Intake              900 ml   Output             1500 ml   Net             -600 ml      Physical Exam   Constitutional: She appears well-developed and well-nourished. No distress.   HENT:   Head: Normocephalic and atraumatic.   Eyes: Conjunctivae are normal.   Neck: Neck supple. No JVD present.   Cardiovascular: Normal rate, regular rhythm and normal heart sounds.    No murmur heard.  Pulmonary/Chest: Effort normal and breath sounds normal. No respiratory distress.   Abdominal: Bowel sounds are normal. There is no tenderness. A hernia is present.   Large hernia pannus of the L abdomen with several stage III sores with surrounding cellulitis   Musculoskeletal: She exhibits no edema.   Neurological: She is alert.   Skin: There is erythema.   Psychiatric: She has a normal mood and affect. Her behavior is normal.   Vitals reviewed.      Significant Labs: All pertinent labs within the past 24 hours have been reviewed.    Significant Imaging: none    Assessment/Plan:      * Severe sepsis  Stable. Likely from cellulitis. Rule out bacteremia-  Bcx negative thus far.   Broad-spectrum IV antibiotics - Zosyn, Linezolid. ID consulted. Line of demarcation drawn on abdomen and stable.  Monitor vitals    Major depression, recurrent, chronic  Stable. Continue Escitalopram      Chronic atrial fibrillation  - continue low dose metoprolol  - continue to hold coumadin until INR within range. Currently down to 2.7 (4.2). Will  resume when it decreases to less than 2.5.   - telemetry    Hypertension associated with diabetes  Resume home meds, titrating and adjusting as needed.   Stable.     Chronic combined systolic and diastolic congestive heart failure  Stable. Monitor daily for signs/ symptoms.    Type 2 diabetes mellitus with complication, without long-term current use of insulin  Stable. Check BG prior to meals and QHS. Administer rapid-acting insulin according to low-dose sliding scale  Hold oral antihyperglymics    Hyperlipidemia  Continue Atorvastatin      Obesity, Class III, BMI 40-49.9 (morbid obesity)  Body mass index is 50.08 kg/(m^2). Morbid obesity complicates all aspects of disease management from diagnostic modalities to treatment. Weight loss encouraged and health benefits explained to patient.        Cellulitis, abdominal wall  Continue antibiotics. ID to evaluate patient. Wound care consulted.       Chronic respiratory failure  Continue supplemental oxygen as routine home use.      VTE Risk Mitigation     None          Saba Mora MD  Department of Hospital Medicine   [facility]

## 2017-04-25 NOTE — PLAN OF CARE
Problem: Patient Care Overview  Goal: Plan of Care Review  Outcome: Ongoing (interventions implemented as appropriate)  Patient alert and oriented resting in bed. NAD. Denies pain or SOB. VSS. BSC. Afib on monitor. O2@2L NC. Plan of care reviewed with patient. Verbalizes understanding.Call light in reach. Pt free from fall or injury. Will monitor.

## 2017-04-25 NOTE — ASSESSMENT & PLAN NOTE
Stable. Likely from cellulitis. Rule out bacteremia-  Bcx negative thus far.   Broad-spectrum IV antibiotics - Zosyn, Linezolid. ID consulted. Line of demarcation drawn on abdomen and stable.  Monitor vitals

## 2017-04-25 NOTE — ASSESSMENT & PLAN NOTE
Stable. Check BG prior to meals and QHS. Administer rapid-acting insulin according to low-dose sliding scale  Hold oral antihyperglymics

## 2017-04-25 NOTE — SUBJECTIVE & OBJECTIVE
Interval History: uneventful night. Daughter at bedside with concerns about the cellulitis. She says the erythema currently present was not there on admission. She is also concerned about the sores on her abdomen- they have been taking too long to heal.     Review of Systems   Constitutional: Negative for chills and fever.   Respiratory: Negative for shortness of breath.    Cardiovascular: Negative for chest pain.   Gastrointestinal: Positive for abdominal pain (where her sores are).   Psychiatric/Behavioral: Negative for confusion.     Objective:     Vital Signs (Most Recent):  Temp: 98.6 °F (37 °C) (04/24/17 2003)  Pulse: 79 (04/24/17 2008)  Resp: 16 (04/24/17 2008)  BP: (!) 116/44 (04/24/17 2003)  SpO2: 99 % (04/24/17 2008) Vital Signs (24h Range):  Temp:  [97.4 °F (36.3 °C)-98.6 °F (37 °C)] 98.6 °F (37 °C)  Pulse:  [62-97] 79  Resp:  [16-20] 16  SpO2:  [79 %-100 %] 99 %  BP: ()/(40-60) 116/44     Weight: (!) 149.4 kg (329 lb 5.9 oz)  Body mass index is 50.08 kg/(m^2).    Intake/Output Summary (Last 24 hours) at 04/24/17 2106  Last data filed at 04/24/17 1717   Gross per 24 hour   Intake          1881.25 ml   Output             2850 ml   Net          -968.75 ml      Physical Exam   Constitutional: She appears well-developed and well-nourished. No distress.   HENT:   Head: Normocephalic and atraumatic.   Eyes: Conjunctivae are normal.   Neck: Neck supple. No JVD present.   Cardiovascular: Normal rate, regular rhythm and normal heart sounds.    No murmur heard.  Pulmonary/Chest: Effort normal and breath sounds normal. No respiratory distress.   Abdominal: Bowel sounds are normal. There is tenderness in the left lower quadrant. A hernia is present.   Large hernia pannus of the L abdomen with several stage III sores with surrounding cellulitis   Musculoskeletal: She exhibits no edema.   Neurological: She is alert.   Psychiatric: She has a normal mood and affect. Her behavior is normal.   Vitals  reviewed.      Significant Labs: All pertinent labs within the past 24 hours have been reviewed.    Significant Imaging: I have reviewed all pertinent imaging results/findings within the past 24 hours.

## 2017-04-25 NOTE — ASSESSMENT & PLAN NOTE
Body mass index is 50.08 kg/(m^2). Morbid obesity complicates all aspects of disease management from diagnostic modalities to treatment. Weight loss encouraged and health benefits explained to patient.

## 2017-04-26 LAB
ALBUMIN SERPL BCP-MCNC: 2.6 G/DL
ALP SERPL-CCNC: 68 U/L
ALT SERPL W/O P-5'-P-CCNC: 74 U/L
ANION GAP SERPL CALC-SCNC: 7 MMOL/L
AST SERPL-CCNC: 53 U/L
BASOPHILS # BLD AUTO: 0 K/UL
BASOPHILS NFR BLD: 0.2 %
BILIRUB SERPL-MCNC: 0.3 MG/DL
BUN SERPL-MCNC: 9 MG/DL
CALCIUM SERPL-MCNC: 9.4 MG/DL
CHLORIDE SERPL-SCNC: 104 MMOL/L
CO2 SERPL-SCNC: 28 MMOL/L
CREAT SERPL-MCNC: 0.7 MG/DL
DIFFERENTIAL METHOD: ABNORMAL
EOSINOPHIL # BLD AUTO: 0.5 K/UL
EOSINOPHIL NFR BLD: 5.3 %
ERYTHROCYTE [DISTWIDTH] IN BLOOD BY AUTOMATED COUNT: 18.7 %
EST. GFR  (AFRICAN AMERICAN): >60 ML/MIN/1.73 M^2
EST. GFR  (NON AFRICAN AMERICAN): >60 ML/MIN/1.73 M^2
GLUCOSE SERPL-MCNC: 104 MG/DL
HCT VFR BLD AUTO: 30.3 %
HGB BLD-MCNC: 9.4 G/DL
INR PPP: 1.8
LYMPHOCYTES # BLD AUTO: 1.4 K/UL
LYMPHOCYTES NFR BLD: 15.6 %
MCH RBC QN AUTO: 25.5 PG
MCHC RBC AUTO-ENTMCNC: 31.1 %
MCV RBC AUTO: 82 FL
MONOCYTES # BLD AUTO: 0.8 K/UL
MONOCYTES NFR BLD: 9.1 %
NEUTROPHILS # BLD AUTO: 6.1 K/UL
NEUTROPHILS NFR BLD: 69.8 %
PLATELET # BLD AUTO: 229 K/UL
PMV BLD AUTO: 8.5 FL
POCT GLUCOSE: 114 MG/DL (ref 70–110)
POCT GLUCOSE: 129 MG/DL (ref 70–110)
POTASSIUM SERPL-SCNC: 4.6 MMOL/L
PROT SERPL-MCNC: 6.8 G/DL
PROTHROMBIN TIME: 18.4 SEC
RBC # BLD AUTO: 3.69 M/UL
SODIUM SERPL-SCNC: 139 MMOL/L
WBC # BLD AUTO: 8.7 K/UL

## 2017-04-26 PROCEDURE — 63600175 PHARM REV CODE 636 W HCPCS: Performed by: FAMILY MEDICINE

## 2017-04-26 PROCEDURE — 80053 COMPREHEN METABOLIC PANEL: CPT

## 2017-04-26 PROCEDURE — 36415 COLL VENOUS BLD VENIPUNCTURE: CPT

## 2017-04-26 PROCEDURE — 25000003 PHARM REV CODE 250: Performed by: PHYSICIAN ASSISTANT

## 2017-04-26 PROCEDURE — 94761 N-INVAS EAR/PLS OXIMETRY MLT: CPT

## 2017-04-26 PROCEDURE — 27000221 HC OXYGEN, UP TO 24 HOURS

## 2017-04-26 PROCEDURE — 94640 AIRWAY INHALATION TREATMENT: CPT

## 2017-04-26 PROCEDURE — 12000002 HC ACUTE/MED SURGE SEMI-PRIVATE ROOM

## 2017-04-26 PROCEDURE — 85025 COMPLETE CBC W/AUTO DIFF WBC: CPT

## 2017-04-26 PROCEDURE — 25000003 PHARM REV CODE 250: Performed by: FAMILY MEDICINE

## 2017-04-26 PROCEDURE — 85610 PROTHROMBIN TIME: CPT

## 2017-04-26 PROCEDURE — 63600175 PHARM REV CODE 636 W HCPCS: Performed by: PHYSICIAN ASSISTANT

## 2017-04-26 RX ORDER — CEFAZOLIN SODIUM 1 G/50ML
1 SOLUTION INTRAVENOUS
Status: DISCONTINUED | OUTPATIENT
Start: 2017-04-26 | End: 2017-04-28

## 2017-04-26 RX ORDER — CEFAZOLIN SODIUM 1 G/3ML
1 INJECTION, POWDER, FOR SOLUTION INTRAMUSCULAR; INTRAVENOUS
Status: DISCONTINUED | OUTPATIENT
Start: 2017-04-26 | End: 2017-04-26

## 2017-04-26 RX ORDER — WARFARIN 2.5 MG/1
2.5 TABLET ORAL DAILY
Status: DISCONTINUED | OUTPATIENT
Start: 2017-04-26 | End: 2017-04-27

## 2017-04-26 RX ADMIN — WARFARIN SODIUM 2.5 MG: 2.5 TABLET ORAL at 04:04

## 2017-04-26 RX ADMIN — POTASSIUM CHLORIDE 20 MEQ: 1500 TABLET, EXTENDED RELEASE ORAL at 08:04

## 2017-04-26 RX ADMIN — HYDROCODONE BITARTRATE AND ACETAMINOPHEN 1 TABLET: 10; 325 TABLET ORAL at 08:04

## 2017-04-26 RX ADMIN — CEFAZOLIN SODIUM 1 G: 1 SOLUTION INTRAVENOUS at 04:04

## 2017-04-26 RX ADMIN — Medication: at 08:04

## 2017-04-26 RX ADMIN — Medication 400 MG: at 08:04

## 2017-04-26 RX ADMIN — IPRATROPIUM BROMIDE 0.5 MG: 0.5 SOLUTION RESPIRATORY (INHALATION) at 07:04

## 2017-04-26 RX ADMIN — GABAPENTIN 600 MG: 300 CAPSULE ORAL at 08:04

## 2017-04-26 RX ADMIN — HYDROCODONE BITARTRATE AND ACETAMINOPHEN 1 TABLET: 10; 325 TABLET ORAL at 12:04

## 2017-04-26 RX ADMIN — ZINC SULFATE 220 MG (50 MG) CAPSULE 220 MG: CAPSULE at 08:04

## 2017-04-26 RX ADMIN — GABAPENTIN 600 MG: 300 CAPSULE ORAL at 05:04

## 2017-04-26 RX ADMIN — LEVALBUTEROL 1.25 MG: 1.25 SOLUTION, CONCENTRATE RESPIRATORY (INHALATION) at 07:04

## 2017-04-26 RX ADMIN — ATORVASTATIN CALCIUM 20 MG: 20 TABLET, FILM COATED ORAL at 08:04

## 2017-04-26 RX ADMIN — METHOCARBAMOL 750 MG: 750 TABLET ORAL at 05:04

## 2017-04-26 RX ADMIN — LEVALBUTEROL 1.25 MG: 1.25 SOLUTION, CONCENTRATE RESPIRATORY (INHALATION) at 01:04

## 2017-04-26 RX ADMIN — IPRATROPIUM BROMIDE 0.5 MG: 0.5 SOLUTION RESPIRATORY (INHALATION) at 01:04

## 2017-04-26 RX ADMIN — METOPROLOL SUCCINATE 12.5 MG: 25 TABLET, EXTENDED RELEASE ORAL at 09:04

## 2017-04-26 RX ADMIN — FUROSEMIDE 40 MG: 40 TABLET ORAL at 05:04

## 2017-04-26 RX ADMIN — GABAPENTIN 600 MG: 300 CAPSULE ORAL at 03:04

## 2017-04-26 RX ADMIN — CEFAZOLIN SODIUM 1 G: 1 SOLUTION INTRAVENOUS at 01:04

## 2017-04-26 RX ADMIN — METHOCARBAMOL 750 MG: 750 TABLET ORAL at 12:04

## 2017-04-26 RX ADMIN — ESCITALOPRAM 20 MG: 10 TABLET, FILM COATED ORAL at 08:04

## 2017-04-26 RX ADMIN — HYDROCODONE BITARTRATE AND ACETAMINOPHEN 1 TABLET: 10; 325 TABLET ORAL at 04:04

## 2017-04-26 RX ADMIN — FLUCONAZOLE 100 MG: 100 TABLET ORAL at 08:04

## 2017-04-26 RX ADMIN — IPRATROPIUM BROMIDE 0.5 MG: 0.5 SOLUTION RESPIRATORY (INHALATION) at 08:04

## 2017-04-26 RX ADMIN — LEVALBUTEROL 1.25 MG: 1.25 SOLUTION, CONCENTRATE RESPIRATORY (INHALATION) at 08:04

## 2017-04-26 RX ADMIN — FUROSEMIDE 40 MG: 40 TABLET ORAL at 08:04

## 2017-04-26 RX ADMIN — CEFAZOLIN SODIUM 1 G: 1 SOLUTION INTRAVENOUS at 08:04

## 2017-04-26 RX ADMIN — CLONAZEPAM 1 MG: 1 TABLET ORAL at 08:04

## 2017-04-26 NOTE — PLAN OF CARE
Problem: Patient Care Overview  Goal: Plan of Care Review  Outcome: Ongoing (interventions implemented as appropriate)  Patient remained safe and free from falls this shift. Bed in lowest position, SR raised, call light in reach, VSS, dressing to abdomen changed.

## 2017-04-26 NOTE — PLAN OF CARE
Problem: Patient Care Overview  Goal: Plan of Care Review  Nc in use with aerosol Q6 hrs W/A tolerates well'

## 2017-04-26 NOTE — NURSING
"When spoke with patient regards to getting medication ordered and placed in pharmacy. Pt states " I don't have a bottle my daughter brings me 2 15 mg temazepam every day" Pt pulled to orange capsules out of her pocket. 2 capsules removed from patient room.  Merrill EGAN notified.   "

## 2017-04-26 NOTE — ASSESSMENT & PLAN NOTE
Stable. Likely from cellulitis. Rule out bacteremia-  Bcx negative thus far. Continue IV antibiotics.  Monitor vitals

## 2017-04-26 NOTE — SUBJECTIVE & OBJECTIVE
Interval History: uneventful night. Patient counseled on taking outside medication without knowledge of staff. She verbalizes understanding.     Review of Systems   Constitutional: Negative for chills and fever.   Respiratory: Negative for shortness of breath.    Cardiovascular: Negative for chest pain.   Gastrointestinal: Negative for abdominal pain.   Psychiatric/Behavioral: Negative for confusion.     Objective:     Vital Signs (Most Recent):  Temp: 97.8 °F (36.6 °C) (04/26/17 1500)  Pulse: 62 (04/26/17 1500)  Resp: 18 (04/26/17 1500)  BP: (!) 124/58 (04/26/17 1500)  SpO2: (!) 92 % (04/26/17 1500) Vital Signs (24h Range):  Temp:  [97.8 °F (36.6 °C)-98.4 °F (36.9 °C)] 97.8 °F (36.6 °C)  Pulse:  [54-90] 62  Resp:  [16-20] 18  SpO2:  [92 %-99 %] 92 %  BP: (124-128)/(58-69) 124/58     Weight: (!) 149.4 kg (329 lb 5.9 oz)  Body mass index is 50.08 kg/(m^2).    Intake/Output Summary (Last 24 hours) at 04/26/17 1647  Last data filed at 04/25/17 1816   Gross per 24 hour   Intake              540 ml   Output                0 ml   Net              540 ml      Physical Exam   Constitutional: She appears well-developed and well-nourished. No distress.   HENT:   Head: Normocephalic and atraumatic.   Eyes: Conjunctivae are normal.   Neck: Neck supple. No JVD present.   Cardiovascular: Normal rate, regular rhythm and normal heart sounds.    No murmur heard.  Pulmonary/Chest: Effort normal and breath sounds normal. No respiratory distress.   Abdominal: Bowel sounds are normal. There is no tenderness. A hernia is present.   Large hernia pannus of the L abdomen. Ulcers bandaged with evidence of drainage. Line of demarcation does not show worsening of cellulitis.   Musculoskeletal: She exhibits no edema.   Neurological: She is alert.   Skin: There is erythema.   Psychiatric: She has a normal mood and affect. Her behavior is normal.   Vitals reviewed.      Significant Labs: All pertinent labs within the past 24 hours have been  reviewed.    Significant Imaging: none

## 2017-04-26 NOTE — NURSING
"Pt rounds. Pt asked if she had anymore medications to take through out the night and nurse advised yes. Pt states " Don't wake me because once I take my sleeping pill from home, I want to sleep" I advised patient that she is not allowed to take her home medications without our knowledge for safety measures. Pt states " I have been taking this nightly and just didn't know if yall would order my sleeping medication or not". I advised the patient that I will discuss this with the on call  And I would have to put her home med in the pharmacy bin for her and pt refused to allow me to take the medication. I once again advised patient not to take her own medication until it is ordered.   "

## 2017-04-26 NOTE — PLAN OF CARE
Problem: Patient Care Overview  Goal: Plan of Care Review  Outcome: Ongoing (interventions implemented as appropriate)  Plan of care reviewed with patients, verbalized understanding  Bed wheels locked, call light in reach, bed side rail up x 2.  Ambulated up to bedside commode without assistance free from falls/injuries  On 2L nasal cannula SPO2 95%  Pain management  Denies any SOB  Wound care done, moderate amount of discharge noted, no foul smell  On telemetry controlled A.Fib HR 52 bpm       Will continue to monitor

## 2017-04-26 NOTE — CONSULTS
Consult Note  Infectious Disease    Consult Requested By: Saba Mora MD    Reason for Consult: Cellulitis , sepsis     SUBJECTIVE:     History of Present Illness:  Patient is a 66 y.o. female with a large ventral hernia that hangs over to the left side. THis is complicated by venous stasis and ulcerations on the left side. She was recently admitted for cellulitis of her pannus and treated with Ancef. Wounds were debrided and grew Proteus and Pseudomonas and ultimately she was discharge on Cipro. 3 days ago she developed rigors without fevers followed by acute onset of erythema and pain on her panus  Similar to previous episodes. ON admission she had a temp of 103 and wbc of 23. She was started on Linezolid and Zosyn. Erythema is still present but pain is much better on her pannus. WBC is improved and fevers have resolved. SHe complains of copious serous drainage from her wounds       Past Medical History:   Diagnosis Date    *Atrial flutter     Anxiety     Arthritis     Asthma     Atrial fibrillation     Back pain     Cataract     OD    CHF (congestive heart failure)     COPD (chronic obstructive pulmonary disease)     Depression     Diabetes mellitus     Emphysema of lung     Heart failure     Hernia     Hypercapnic respiratory failure, chronic 2016    Hyperlipidemia     Hypertension     Iron deficiency anemia 2/3/2016    Myocardial infarction     Obesity     Peripheral vascular disease     Pneumonia     Polyneuropathy     Retinal detachment     OS    Septic shock 2017    Skin ulcer 3/18/2017    Tobacco dependence     Type II 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, not stated as uncontrolled      Past Surgical History:   Procedure Laterality Date    CATARACT EXTRACTION Left     OS      SECTION      x2    EYE SURGERY      HYSTERECTOMY      OOPHORECTOMY      one ovary conserved    RETINAL DETACHMENT SURGERY      buckle --OS     TONSILLECTOMY       Family History   Problem Relation Age of Onset    Arthritis Father     Heart disease Father     Early death Sister 30     heart disease    Heart disease Sister 32     MI    No Known Problems Brother     No Known Problems Daughter     Blindness Son      due to accident//    Arthritis Sister     Heart disease Sister     Crohn's disease Sister     Cancer Brother      asbestosis    Alcohol abuse Mother     Breast cancer Neg Hx     Glaucoma Neg Hx     Macular degeneration Neg Hx     Retinal detachment Neg Hx     Amblyopia Neg Hx     Diabetes Neg Hx     Cataracts Neg Hx     Hypertension Neg Hx     Strabismus Neg Hx     Stroke Neg Hx     Thyroid disease Neg Hx      Social History   Substance Use Topics    Smoking status: Former Smoker     Packs/day: 0.25     Years: 50.00     Types: Cigarettes     Start date: 1/19/1968     Quit date: 9/19/2015    Smokeless tobacco: Former User     Quit date: 2/12/2017      Comment: 1 ppd for 20 years    Alcohol use No       Review of patient's allergies indicates:   Allergen Reactions    Dilaudid [hydromorphone] Anaphylaxis     Other reaction(s): Anaphylaxis  Other reaction(s): Unknown        Antibiotics     Start     Stop Route Frequency Ordered    04/23/17 1700  linezolid 600mg/300ml IVPB 600 mg      -- IV Every 12 hours (non-standard times) 04/23/17 0451    04/23/17 0600  piperacillin-tazobactam 4.5 g in dextrose 5 % 100 mL IVPB (ready to mix system)      -- IV Every 8 hours (non-standard times) 04/23/17 0451          Review of Systems:  Copious serous drainage from his wounds.     OBJECTIVE:     Vital Signs (Most Recent)  Temp: 97.9 °F (36.6 °C) (04/25/17 1913)  Pulse: 66 (04/25/17 2044)  Resp: 16 (04/25/17 2044)  BP: 128/69 (04/25/17 1913)  SpO2: (!) 94 % (04/25/17 2044)    Temperature Range Min/Max (Last 24H):  Temp:  [97.7 °F (36.5 °C)-98.7 °F (37.1 °C)]     Physical Exam:  Alert and oriented, NAD< eomi, sclera anicteric, mmm   rrr  no m/g/r   Lungs are cta anteriorly   abd has a very large ventral hernia with erythema extending over the dependent portion of it. Ulceration on inferior aspect of the pannus with shallow ulcerations that do not appear infected, some serous drainage       Laboratory:  CBC    Recent Labs  Lab 04/25/17 0422   WBC 11.70   RBC 3.63*   HGB 9.2*   HCT 29.6*        BMP    Recent Labs  Lab 04/25/17 0422   CO2 30*   BUN 9   CREATININE 0.7   CALCIUM 9.1     Microbiology Results (last 7 days)     Procedure Component Value Units Date/Time    Blood culture [[id number]] Collected:  04/23/17 0543    Order Status:  Completed Specimen:  Blood from Peripheral, Left  Hand Updated:  04/25/17 1212     Blood Culture, Routine No Growth to date     Blood Culture, Routine No Growth to date     Blood Culture, Routine No Growth to date    Narrative:       Aerobic and anaerobic    Urine culture [[id number]] Collected:  04/23/17 0529    Order Status:  Completed Specimen:  Urine from Urine, Catheterized Updated:  04/24/17 0758     Urine Culture, Routine No growth            ASSESSMENT/PLAN:     Active Hospital Problems    Diagnosis  POA    *Severe sepsis [A41.9, R65.20]  Yes    Chronic respiratory failure [J96.10]  Yes    Cellulitis, abdominal wall [L03.311]  Yes    Obesity, Class III, BMI 40-49.9 (morbid obesity) [E66.01]  Yes    Hyperlipidemia [E78.5]  Yes    Type 2 diabetes mellitus with complication, without long-term current use of insulin [E11.8]  Yes    Hypertension associated with diabetes [E11.59, I10]  Yes    Chronic combined systolic and diastolic congestive heart failure [I50.42]  Yes    Chronic atrial fibrillation [I48.2]  Yes    Major depression, recurrent, chronic [F33.9]  Yes      Resolved Hospital Problems    Diagnosis Date Resolved POA    Hyperkalemia [E87.5] 04/24/2017 Yes     - Stop Linezolid   - Can Change Zosyn to Ancef. - most likely a strep cellultiis   - will need to consider prohylaxis in the future  as episodes have become increasingly frequent and severe.   - will follow. Thank you for consultation.

## 2017-04-26 NOTE — PLAN OF CARE
04/25/17 2044   Patient Assessment/Suction   Level of Consciousness (AVPU) alert   Respiratory Effort Normal   Expansion/Accessory Muscles/Retractions expansion symmetric   All Lung Fields Breath Sounds diminished   Rhythm/Pattern, Respiratory depth regular   Cough Frequency infrequent   Cough Type good;nonproductive   Is this patient in SNF or Inpatient Rehab? No   PRE-TX-O2-ETCO2   O2 Device (Oxygen Therapy) nasal cannula   $ Is the patient on Oxygen? Yes   Flow (L/min) 2   SpO2 (!) 94 %   Pulse Oximetry Type Intermittent   $ Pulse Oximetry - Multiple Charge Pulse Oximetry - Multiple   Pulse 66   Resp 16   Positioning HOB elevated 30 degrees   Aerosol Therapy   $ Aerosol Therapy Charges Aerosol Treatment   Respiratory Treatment Status given   SVN/Inhaler Treatment Route mask   Position During Treatment HOB at 30 degrees   Patient Tolerance good   Post-Treatment   Post-treatment Heart Rate (beats/min) 70   Post-treatment Resp Rate (breaths/min) 16   All Fields Breath Sounds unchanged

## 2017-04-26 NOTE — PROGRESS NOTES
[facility] Hospital Medicine  Progress Note    Patient Name: Nelly Tian  MRN: [id number]  Patient Class: IP- Inpatient   Admission Date: 4/22/2017  Length of Stay: 3 days  Attending Physician: Saba Mora MD  Primary Care Provider: Constantine Bird MD    Subjective:     Principal Problem:Severe sepsis    HPI:  Patient presents for evaluation of anxiety. She is a poor historian. She received anxiolytic prior to my interview and exam. She reports feeling anxious but can't provide time of onset. She reports using nebulized bronchodilators routinely. She reports chronic shortness of breath and states she uses supplemental oxygen at home continuously. She is unable to say if her shortness of breath has worsened. She denies peripheral edema. She reports using 3-4 pillows to sleep. She denies increased orthopnea. She reports having LEFT-sided wound which is cared for by home healthcare. She reports antibiotic regimen being finished. She denies fever or chills. She denies cough, sore throat. She denies dysuria, flank pain. She denies chest pain.    She was evaluated in the ED. She was administered anxiolytic. She was found to be in atrial fibrillation with RVR. She was administered IV diltiazem which improved her heart rate. She was admitted to telemetry unit. She became febrile. Blood culture obtained. IV Linezolid, Zosyn administered.      Hospital Course:  Pt transferred to ICU after she became hypotensive and febrile. She did not require pressors. She was observed overnight in the ICU. ID consulted for assistance with antibiotic regimen. Wound care administered to ulcers on abdomen    Interval History: uneventful night. Patient counseled on taking outside medication without knowledge of staff. She verbalizes understanding.     Review of Systems   Constitutional: Negative for chills and fever.   Respiratory: Negative for shortness of breath.    Cardiovascular: Negative for chest pain.    Gastrointestinal: Negative for abdominal pain.   Psychiatric/Behavioral: Negative for confusion.     Objective:     Vital Signs (Most Recent):  Temp: 97.8 °F (36.6 °C) (04/26/17 1500)  Pulse: 62 (04/26/17 1500)  Resp: 18 (04/26/17 1500)  BP: (!) 124/58 (04/26/17 1500)  SpO2: (!) 92 % (04/26/17 1500) Vital Signs (24h Range):  Temp:  [97.8 °F (36.6 °C)-98.4 °F (36.9 °C)] 97.8 °F (36.6 °C)  Pulse:  [54-90] 62  Resp:  [16-20] 18  SpO2:  [92 %-99 %] 92 %  BP: (124-128)/(58-69) 124/58     Weight: (!) 149.4 kg (329 lb 5.9 oz)  Body mass index is 50.08 kg/(m^2).    Intake/Output Summary (Last 24 hours) at 04/26/17 1647  Last data filed at 04/25/17 1816   Gross per 24 hour   Intake              540 ml   Output                0 ml   Net              540 ml      Physical Exam   Constitutional: She appears well-developed and well-nourished. No distress.   HENT:   Head: Normocephalic and atraumatic.   Eyes: Conjunctivae are normal.   Neck: Neck supple. No JVD present.   Cardiovascular: Normal rate, regular rhythm and normal heart sounds.    No murmur heard.  Pulmonary/Chest: Effort normal and breath sounds normal. No respiratory distress.   Abdominal: Bowel sounds are normal. There is no tenderness. A hernia is present.   Large hernia pannus of the L abdomen. Ulcers bandaged with evidence of drainage. Line of demarcation does not show worsening of cellulitis.   Musculoskeletal: She exhibits no edema.   Neurological: She is alert.   Skin: There is erythema.   Psychiatric: She has a normal mood and affect. Her behavior is normal.   Vitals reviewed.      Significant Labs: All pertinent labs within the past 24 hours have been reviewed.    Significant Imaging: none    Assessment/Plan:      * Severe sepsis  Stable. Likely from cellulitis. Rule out bacteremia-  Bcx negative thus far. Continue IV antibiotics.  Monitor vitals    Major depression, recurrent, chronic  Stable. Continue Escitalopram      Chronic atrial fibrillation  -  continue low dose metoprolol and OAC   - telemetry    Hypertension associated with diabetes  Resume home meds, titrating and adjusting as needed.   Stable.     Chronic combined systolic and diastolic congestive heart failure  Stable. Monitor daily for signs/ symptoms.    Type 2 diabetes mellitus with complication, without long-term current use of insulin  Stable. Check BG prior to meals and QHS. Administer rapid-acting insulin according to low-dose sliding scale  Hold oral antihyperglymics    Hyperlipidemia  Continue Atorvastatin      Obesity, Class III, BMI 40-49.9 (morbid obesity)  Body mass index is 50.08 kg/(m^2). Morbid obesity complicates all aspects of disease management from diagnostic modalities to treatment. Weight loss encouraged and health benefits explained to patient.        Cellulitis, abdominal wall  Continue antibiotics. ID following. Daily wound care.       Chronic respiratory failure  Continue supplemental oxygen as routine home use.      VTE Risk Mitigation         Ordered     warfarin (COUMADIN) tablet 2.5 mg  Daily     Route:  Oral        04/26/17 0919          Saba Mora MD  Department of Hospital Medicine   [facility]

## 2017-04-26 NOTE — SIGNIFICANT EVENT
Patient has been taking her home Restoril unbeknownst to staff. Her daughter has been brining her two pills every day. We confiscated the medication. Staff spoke with patient regarding the dangers. I spoke with the patient's daughter (Maye) via telephone regarding the dangers of taking medication that has not been checked for interactions and how some medication can alter clinical picture. She expressed understanding and agrees not to bring medication without consulting providers here.

## 2017-04-27 ENCOUNTER — OUTPATIENT CASE MANAGEMENT (OUTPATIENT)
Dept: ADMINISTRATIVE | Facility: OTHER | Age: 67
End: 2017-04-27

## 2017-04-27 PROBLEM — R65.20 SEVERE SEPSIS: Status: RESOLVED | Noted: 2017-04-05 | Resolved: 2017-04-27

## 2017-04-27 PROBLEM — A41.9 SEVERE SEPSIS: Status: RESOLVED | Noted: 2017-04-05 | Resolved: 2017-04-27

## 2017-04-27 LAB
ALBUMIN SERPL BCP-MCNC: 2.7 G/DL
ALP SERPL-CCNC: 69 U/L
ALT SERPL W/O P-5'-P-CCNC: 52 U/L
ANION GAP SERPL CALC-SCNC: 9 MMOL/L
AST SERPL-CCNC: 30 U/L
BASOPHILS # BLD AUTO: 0.1 K/UL
BASOPHILS NFR BLD: 0.8 %
BILIRUB SERPL-MCNC: 0.4 MG/DL
BUN SERPL-MCNC: 10 MG/DL
CALCIUM SERPL-MCNC: 9.4 MG/DL
CHLORIDE SERPL-SCNC: 101 MMOL/L
CO2 SERPL-SCNC: 31 MMOL/L
CREAT SERPL-MCNC: 0.7 MG/DL
DIFFERENTIAL METHOD: ABNORMAL
EOSINOPHIL # BLD AUTO: 0.5 K/UL
EOSINOPHIL NFR BLD: 6.9 %
ERYTHROCYTE [DISTWIDTH] IN BLOOD BY AUTOMATED COUNT: 18.4 %
EST. GFR  (AFRICAN AMERICAN): >60 ML/MIN/1.73 M^2
EST. GFR  (NON AFRICAN AMERICAN): >60 ML/MIN/1.73 M^2
GLUCOSE SERPL-MCNC: 95 MG/DL
HCT VFR BLD AUTO: 31.5 %
HGB BLD-MCNC: 10.1 G/DL
INR PPP: 1.3
LYMPHOCYTES # BLD AUTO: 1.4 K/UL
LYMPHOCYTES NFR BLD: 18.6 %
MCH RBC QN AUTO: 25.8 PG
MCHC RBC AUTO-ENTMCNC: 31.9 %
MCV RBC AUTO: 81 FL
MONOCYTES # BLD AUTO: 0.8 K/UL
MONOCYTES NFR BLD: 10.6 %
NEUTROPHILS # BLD AUTO: 4.6 K/UL
NEUTROPHILS NFR BLD: 63.1 %
PLATELET # BLD AUTO: 241 K/UL
PMV BLD AUTO: 8.6 FL
POCT GLUCOSE: 106 MG/DL (ref 70–110)
POCT GLUCOSE: 136 MG/DL (ref 70–110)
POCT GLUCOSE: 166 MG/DL (ref 70–110)
POTASSIUM SERPL-SCNC: 4.1 MMOL/L
PROT SERPL-MCNC: 7 G/DL
PROTHROMBIN TIME: 13.8 SEC
RBC # BLD AUTO: 3.91 M/UL
SODIUM SERPL-SCNC: 141 MMOL/L
WBC # BLD AUTO: 7.3 K/UL

## 2017-04-27 PROCEDURE — 85025 COMPLETE CBC W/AUTO DIFF WBC: CPT

## 2017-04-27 PROCEDURE — 25000003 PHARM REV CODE 250: Performed by: PHYSICIAN ASSISTANT

## 2017-04-27 PROCEDURE — 94640 AIRWAY INHALATION TREATMENT: CPT

## 2017-04-27 PROCEDURE — 63600175 PHARM REV CODE 636 W HCPCS: Performed by: FAMILY MEDICINE

## 2017-04-27 PROCEDURE — 63600175 PHARM REV CODE 636 W HCPCS: Performed by: PHYSICIAN ASSISTANT

## 2017-04-27 PROCEDURE — 36415 COLL VENOUS BLD VENIPUNCTURE: CPT

## 2017-04-27 PROCEDURE — 80053 COMPREHEN METABOLIC PANEL: CPT

## 2017-04-27 PROCEDURE — 94761 N-INVAS EAR/PLS OXIMETRY MLT: CPT

## 2017-04-27 PROCEDURE — 27000221 HC OXYGEN, UP TO 24 HOURS

## 2017-04-27 PROCEDURE — 12000002 HC ACUTE/MED SURGE SEMI-PRIVATE ROOM

## 2017-04-27 PROCEDURE — 25000003 PHARM REV CODE 250: Performed by: FAMILY MEDICINE

## 2017-04-27 PROCEDURE — 85610 PROTHROMBIN TIME: CPT

## 2017-04-27 RX ORDER — WARFARIN SODIUM 5 MG/1
5 TABLET ORAL DAILY
Status: DISCONTINUED | OUTPATIENT
Start: 2017-04-27 | End: 2017-04-28 | Stop reason: HOSPADM

## 2017-04-27 RX ADMIN — ZINC SULFATE 220 MG (50 MG) CAPSULE 220 MG: CAPSULE at 08:04

## 2017-04-27 RX ADMIN — LEVALBUTEROL 1.25 MG: 1.25 SOLUTION, CONCENTRATE RESPIRATORY (INHALATION) at 01:04

## 2017-04-27 RX ADMIN — FUROSEMIDE 40 MG: 40 TABLET ORAL at 08:04

## 2017-04-27 RX ADMIN — POTASSIUM CHLORIDE 20 MEQ: 1500 TABLET, EXTENDED RELEASE ORAL at 08:04

## 2017-04-27 RX ADMIN — CLONAZEPAM 1 MG: 1 TABLET ORAL at 08:04

## 2017-04-27 RX ADMIN — Medication: at 08:04

## 2017-04-27 RX ADMIN — CEFAZOLIN SODIUM 1 G: 1 SOLUTION INTRAVENOUS at 08:04

## 2017-04-27 RX ADMIN — METHOCARBAMOL 750 MG: 750 TABLET ORAL at 12:04

## 2017-04-27 RX ADMIN — Medication 400 MG: at 08:04

## 2017-04-27 RX ADMIN — GABAPENTIN 600 MG: 300 CAPSULE ORAL at 08:04

## 2017-04-27 RX ADMIN — ATORVASTATIN CALCIUM 20 MG: 20 TABLET, FILM COATED ORAL at 08:04

## 2017-04-27 RX ADMIN — ESCITALOPRAM 20 MG: 10 TABLET, FILM COATED ORAL at 08:04

## 2017-04-27 RX ADMIN — GABAPENTIN 600 MG: 300 CAPSULE ORAL at 05:04

## 2017-04-27 RX ADMIN — FUROSEMIDE 40 MG: 40 TABLET ORAL at 05:04

## 2017-04-27 RX ADMIN — LEVALBUTEROL 1.25 MG: 1.25 SOLUTION, CONCENTRATE RESPIRATORY (INHALATION) at 07:04

## 2017-04-27 RX ADMIN — METHOCARBAMOL 750 MG: 750 TABLET ORAL at 05:04

## 2017-04-27 RX ADMIN — WARFARIN SODIUM 5 MG: 5 TABLET ORAL at 05:04

## 2017-04-27 RX ADMIN — IPRATROPIUM BROMIDE 0.5 MG: 0.5 SOLUTION RESPIRATORY (INHALATION) at 08:04

## 2017-04-27 RX ADMIN — TEMAZEPAM 30 MG: 15 CAPSULE ORAL at 10:04

## 2017-04-27 RX ADMIN — CEFAZOLIN SODIUM 1 G: 1 SOLUTION INTRAVENOUS at 05:04

## 2017-04-27 RX ADMIN — LEVALBUTEROL 1.25 MG: 1.25 SOLUTION, CONCENTRATE RESPIRATORY (INHALATION) at 08:04

## 2017-04-27 RX ADMIN — IPRATROPIUM BROMIDE 0.5 MG: 0.5 SOLUTION RESPIRATORY (INHALATION) at 01:04

## 2017-04-27 RX ADMIN — GABAPENTIN 600 MG: 300 CAPSULE ORAL at 01:04

## 2017-04-27 RX ADMIN — HYDROCODONE BITARTRATE AND ACETAMINOPHEN 1 TABLET: 10; 325 TABLET ORAL at 05:04

## 2017-04-27 RX ADMIN — HYDROCODONE BITARTRATE AND ACETAMINOPHEN 1 TABLET: 10; 325 TABLET ORAL at 08:04

## 2017-04-27 RX ADMIN — IPRATROPIUM BROMIDE 0.5 MG: 0.5 SOLUTION RESPIRATORY (INHALATION) at 07:04

## 2017-04-27 RX ADMIN — CEFAZOLIN SODIUM 1 G: 1 SOLUTION INTRAVENOUS at 12:04

## 2017-04-27 RX ADMIN — METHOCARBAMOL 750 MG: 750 TABLET ORAL at 10:04

## 2017-04-27 RX ADMIN — METOPROLOL SUCCINATE 12.5 MG: 25 TABLET, EXTENDED RELEASE ORAL at 08:04

## 2017-04-27 RX ADMIN — METHOCARBAMOL 750 MG: 750 TABLET ORAL at 01:04

## 2017-04-27 RX ADMIN — FLUCONAZOLE 100 MG: 100 TABLET ORAL at 08:04

## 2017-04-27 NOTE — PROGRESS NOTES
Encountered patient walking in hallway independently with rollator.  Spoke briefly about discharge plan.  Patient w/o complaints.

## 2017-04-27 NOTE — PROGRESS NOTES
[facility] Hospital Medicine  Progress Note    Patient Name: Nelly Tian  MRN: [id number]  Patient Class: IP- Inpatient   Admission Date: 4/22/2017  Length of Stay: 4 days  Attending Physician: Saba Mora MD  Primary Care Provider: Constantine Bird MD    Subjective:     Principal Problem:Cellulitis, abdominal wall    HPI:  Patient presents for evaluation of anxiety. She is a poor historian. She received anxiolytic prior to my interview and exam. She reports feeling anxious but can't provide time of onset. She reports using nebulized bronchodilators routinely. She reports chronic shortness of breath and states she uses supplemental oxygen at home continuously. She is unable to say if her shortness of breath has worsened. She denies peripheral edema. She reports using 3-4 pillows to sleep. She denies increased orthopnea. She reports having LEFT-sided wound which is cared for by home healthcare. She reports antibiotic regimen being finished. She denies fever or chills. She denies cough, sore throat. She denies dysuria, flank pain. She denies chest pain.    She was evaluated in the ED. She was administered anxiolytic. She was found to be in atrial fibrillation with RVR. She was administered IV diltiazem which improved her heart rate. She was admitted to telemetry unit. She became febrile. Blood culture obtained. IV Linezolid, Zosyn administered.      Hospital Course:  Pt transferred to ICU after she became hypotensive and febrile. She did not require pressors. She was observed overnight in the ICU. ID consulted for assistance with antibiotic regimen. Wound care administered to ulcers on abdomen    Interval History: uneventful night. Pain controlled. She is requesting a wedge pillow to lay hernia off of bed.     Review of Systems   Constitutional: Negative for chills and fever.   Respiratory: Negative for shortness of breath.    Cardiovascular: Negative for chest pain.   Gastrointestinal:  Negative for abdominal pain.   Psychiatric/Behavioral: Negative for confusion.     Objective:     Vital Signs (Most Recent):  Temp: 98 °F (36.7 °C) (04/27/17 1155)  Pulse: 61 (04/27/17 1155)  Resp: 18 (04/27/17 1155)  BP: (!) 156/61 (04/27/17 1155)  SpO2: (!) 94 % (04/27/17 1155) Vital Signs (24h Range):  Temp:  [97.6 °F (36.4 °C)-98.3 °F (36.8 °C)] 98 °F (36.7 °C)  Pulse:  [54-83] 61  Resp:  [16-21] 18  SpO2:  [92 %-99 %] 94 %  BP: (124-156)/(57-67) 156/61     Weight: (!) 149.4 kg (329 lb 5.9 oz)  Body mass index is 50.08 kg/(m^2).    Intake/Output Summary (Last 24 hours) at 04/27/17 1314  Last data filed at 04/27/17 0456   Gross per 24 hour   Intake              430 ml   Output                0 ml   Net              430 ml      Physical Exam   Constitutional: She appears well-developed and well-nourished. No distress.   HENT:   Head: Normocephalic and atraumatic.   Eyes: Conjunctivae are normal.   Neck: Neck supple. No JVD present.   Cardiovascular: Normal rate, regular rhythm and normal heart sounds.    No murmur heard.  Pulmonary/Chest: Effort normal and breath sounds normal. No respiratory distress.   Abdominal: Bowel sounds are normal. There is no tenderness. A hernia is present.   Large hernia pannus of the L abdomen. Clean bandages present. Line of demarcation does not show worsening of cellulitis.   Musculoskeletal: She exhibits no edema.   Neurological: She is alert.   Skin: There is erythema (improved).   Psychiatric: She has a normal mood and affect. Her behavior is normal.   Vitals reviewed.      Significant Labs: All pertinent labs within the past 24 hours have been reviewed.    Significant Imaging: none    Assessment/Plan:      * Cellulitis, abdominal wall  Improving. Continue antibiotics. ID following. Daily wound care.       Major depression, recurrent, chronic  Stable. Continue Escitalopram      Chronic atrial fibrillation  - stable. continue low dose metoprolol and OAC. INR decreased. Will  increase coumadin dose.   - telemetry    Hypertension associated with diabetes  Resume home meds, titrating and adjusting as needed.   Stable.     Chronic combined systolic and diastolic congestive heart failure  Stable. Monitor daily for signs/ symptoms.    Type 2 diabetes mellitus with complication, without long-term current use of insulin  Stable. Check BG prior to meals and QHS. Administer rapid-acting insulin according to low-dose sliding scale  Hold oral antihyperglymics    Hyperlipidemia  Continue Atorvastatin      Obesity, Class III, BMI 40-49.9 (morbid obesity)  Body mass index is 50.08 kg/(m^2). Morbid obesity complicates all aspects of disease management from diagnostic modalities to treatment. Weight loss encouraged and health benefits explained to patient.        Chronic respiratory failure  Continue supplemental oxygen as routine home use.     Severe sepsis, resolved as of 4/27/2017  Stable. Likely from cellulitis. Rule out bacteremia-  Bcx negative thus far. Continue IV antibiotics.  Monitor vitals    VTE Risk Mitigation         Ordered     warfarin (COUMADIN) tablet 5 mg  Daily     Route:  Oral        04/27/17 0913        Anticipate 1-3 more days of IV abx needed.     Saba Mora MD  Department of Hospital Medicine   [facility]

## 2017-04-27 NOTE — PLAN OF CARE
Problem: Patient Care Overview  Goal: Plan of Care Review  Outcome: Ongoing (interventions implemented as appropriate)  Pt resting in bed  NAD noted   up to bedside commode without assistance  ABX infused as order  Walker at bedside  Afib on tele, HR72  Free of fall or injury  Bed in lowest position, wheels locked  Call light in reach  Side rails up x2  Will continue to monitor

## 2017-04-27 NOTE — PROGRESS NOTES
"4/27/17: LCSW contacted pt's daughter Maye to give her the number to "[employer] in [facility].  LCSW explained to Maye how she would have to apply for the [employer] waiver if she is interested in possibly becoming a paid caregiver.  Maye self-reports her mother moving out of ICU.  LCSW will follow up with pt at a later date.    4/11/17: LCSW contacted pt for follow up.  Pt is still in hospital due to sepsis; she believes she may be discharged today.  Pt self-reports feeling better; she believes her PCP put in orders for her to receive a caregiver.  Pt would like her daughter to be a paid caregiver through a personal care agency.  LCSW contacted [employer] in [facility] ([phone]) to inquire about pt's daughter becoming a paid caregiver.  LCSW was told by [employer] in [facility] representative how pt's daughter could call their number to apply for the [employer] waiver and they will mail her a list of provider agencies if she qualifies.            3/29/17: LCSW attempted to contact pt for follow up.  Pt answered but asked if LCSW could call her back in 15 minutes.  LCSW attempted to call pt back in 15 minutes; no answer, left voicemail.      3/21/17: LCSW contacted pt for follow up.  Pt self-reports recently being discharged from hospital due to skin ulcer.  Pt stated she will begin wound care at [facility] on Thursday.  Pt self-reports her depression as about the same.  LCSW asked pt if she would consider seeing a therapist for her depression and she said she would not; pt said, "I have too many things going on right now and I do not think I could handle another appointment at this time."    3/17/17: LCSW attempted to contact pt for follow up; no answer, left voicemail.  LCSW will send pt an "attempted to contact" message via pt portal.      3/13/17: LCSW attempted to contact pt for follow up; no answer, left voicemail.  LCSW did read encounter about pt being admitted " "to ED for abdominal wall ulcer with fat layer exposed on 3/10/17.    3/6/17: LCSW contacted pt to complete SW assessment.  This is a 67 yo female pt who lives with her daughter and grandchild.  Pt is a  and her late  served in the [employer]; they have not accessed [facility] for any [facility].  Pt has a hx of major depression chronic and anxiety.  Pt self-reports her depression coming as a result of losing some functionality approximately six years ago.  Pt states how she used to be someone who was "constantly on the go, and to not be able to do that anymore has caused me to have depression." Pt is not currently receiving counseling or therapy for depression but she does take Lexapro as prescribed.  Pt self-reports being able to perform most of her ADL's but she does occasionally receive assistance from her daughter and grandchild.      3/3/17: LCSW attempted to contact pt; no answer, left voicemail.  "

## 2017-04-27 NOTE — PLAN OF CARE
Problem: Patient Care Overview  Goal: Plan of Care Review  Outcome: Ongoing (interventions implemented as appropriate)  POC reviewed with pt, understanding verbalized. Pt ambulates to BSC frequently. Afib on tele. PRN pain meds given with moderate relief. Safety maintained. Call light within reach. Will continue to monitor.

## 2017-04-27 NOTE — PROGRESS NOTES
Progress Note  Infectious Disease    Admit Date: 4/22/2017   LOS: 3 days     SUBJECTIVE:     Follow-up For:  Cellulitis of the pannus     Antibiotics     Start     Stop Route Frequency Ordered    04/26/17 0130  ceFAZolin (ANCEF) 1 gram in dextrose 5 % 50 mL IVPB (premix)      -- IV Every 8 hours (non-standard times) 04/26/17 0035          Review of Systems:  Doesn't feel that she is doingmuch better but pain is less. She is unabl to kep off of the left side. No more fevers.     OBJECTIVE:     Vital Signs (Most Recent)  Temp: 97.6 °F (36.4 °C) (04/26/17 2022)  Pulse: 74 (04/26/17 2050)  Resp: 20 (04/26/17 2050)  BP: 135/67 (04/26/17 2022)  SpO2: 97 % (04/26/17 2050)    Temperature Range Min/Max (Last 24H):  Temp:  [97.6 °F (36.4 °C)-98.4 °F (36.9 °C)]     I & O (Last 24H):  Intake/Output Summary (Last 24 hours) at 04/26/17 6192  Last data filed at 04/26/17 2053   Gross per 24 hour   Intake             1220 ml   Output                0 ml   Net             1220 ml       Physical Exam:  Alert and oriented, nad, eomi   rrr no m/g/r lungs are cta anteriorly   erythem of pannus is faded and much less tender       Lines/Drains:       Percutaneous Central Line Insertion/Assessment - triple lumen  04/23/17 1020 right internal jugular (Active)   Dressing biopatch in place 4/26/2017  8:22 PM   Securement secured w/ sutures 4/26/2017  8:22 PM   Distal Patency/Care normal saline locked 4/26/2017  8:22 PM   Medial Patency/Care normal saline locked 4/26/2017  8:22 PM   Proximal Patency/Care normal saline locked 4/26/2017  8:22 PM   Dressing Change Due 04/30/17 4/24/2017  4:05 AM   Daily Line Review Performed 4/24/2017  4:05 AM       Laboratory:  CBC    Recent Labs  Lab 04/26/17  0441   WBC 8.70   RBC 3.69*   HGB 9.4*   HCT 30.3*      MCV 82   MCH 25.5*   MCHC 31.1*     BMP    Recent Labs  Lab 04/26/17  0441      K 4.6      CO2 28   BUN 9   CREATININE 0.7   CALCIUM 9.4     Microbiology Results (last 7 days)      Procedure Component Value Units Date/Time    Blood culture [[id number]] Collected:  04/23/17 0543    Order Status:  Completed Specimen:  Blood from Peripheral, Left  Hand Updated:  04/26/17 1212     Blood Culture, Routine No Growth to date     Blood Culture, Routine No Growth to date     Blood Culture, Routine No Growth to date     Blood Culture, Routine No Growth to date    Narrative:       Aerobic and anaerobic    Urine culture [[id number]] Collected:  04/23/17 0529    Order Status:  Completed Specimen:  Urine from Urine, Catheterized Updated:  04/24/17 0758     Urine Culture, Routine No growth          ASSESSMENT/PLAN:     Active Hospital Problems    Diagnosis  POA    *Severe sepsis [A41.9, R65.20]  Yes    Chronic respiratory failure [J96.10]  Yes    Cellulitis, abdominal wall [L03.311]  Yes    Obesity, Class III, BMI 40-49.9 (morbid obesity) [E66.01]  Yes    Hyperlipidemia [E78.5]  Yes    Type 2 diabetes mellitus with complication, without long-term current use of insulin [E11.8]  Yes    Hypertension associated with diabetes [E11.59, I10]  Yes    Chronic combined systolic and diastolic congestive heart failure [I50.42]  Yes    Chronic atrial fibrillation [I48.2]  Yes    Major depression, recurrent, chronic [F33.9]  Yes      Resolved Hospital Problems    Diagnosis Date Resolved POA    Hyperkalemia [E87.5] 04/24/2017 Yes     - Continue Ancef, Cellulitis is improving.   - will be able to discharge on po Keflex when better. Consider prophylaxis   - continue on diflucan for candidiasis

## 2017-04-27 NOTE — ASSESSMENT & PLAN NOTE
- stable. continue low dose metoprolol and OAC. INR decreased. Will increase coumadin dose.   - telemetry

## 2017-04-27 NOTE — PLAN OF CARE
04/27/17 0717   Patient Assessment/Suction   Level of Consciousness (AVPU) alert   Respiratory Effort Normal;Unlabored   Expansion/Accessory Muscles/Retractions no retractions;no use of accessory muscles   All Lung Fields Breath Sounds clear;diminished   Cough Frequency infrequent   Cough Type good;nonproductive   PRE-TX-O2-ETCO2   O2 Device (Oxygen Therapy) nasal cannula w/ humidification   $ Is the patient on Oxygen? Yes   Flow (L/min) 2   Oxygen Concentration (%) 28   SpO2 99 %   Pulse Oximetry Type Intermittent   $ Pulse Oximetry - Multiple Charge Pulse Oximetry - Multiple   Pulse 83   Resp 16   Aerosol Therapy   $ Aerosol Therapy Charges Aerosol Treatment   Respiratory Treatment Status given   SVN/Inhaler Treatment Route mask   Position During Treatment HOB at 45 degrees   Patient Tolerance good   Post-Treatment   Post-treatment Heart Rate (beats/min) 85   Post-treatment Resp Rate (breaths/min) 16   All Fields Breath Sounds aeration increased       Aerosol treatments q6 w/a. Patient tolerated well.

## 2017-04-27 NOTE — PLAN OF CARE
04/26/17 2050   Patient Assessment/Suction   Level of Consciousness (AVPU) alert   Respiratory Effort Normal;Unlabored   Expansion/Accessory Muscles/Retractions no retractions;no use of accessory muscles   All Lung Fields Breath Sounds diminished   Cough Type nonproductive   PRE-TX-O2-ETCO2   O2 Device (Oxygen Therapy) nasal cannula   Flow (L/min) 2   Oxygen Concentration (%) 28   SpO2 97 %   Pulse Oximetry Type Intermittent   Pulse 74   Resp 20   Aerosol Therapy   $ Aerosol Therapy Charges Aerosol Treatment   Respiratory Treatment Status given   SVN/Inhaler Treatment Route mask   Position During Treatment HOB at 45 degrees   Patient Tolerance good   Post-Treatment   Post-treatment Heart Rate (beats/min) 72   Post-treatment Resp Rate (breaths/min) 18   All Fields Breath Sounds diminished   Ready to Wean/Extubation Screen   FIO2<60 (chart decimal) 0.28

## 2017-04-27 NOTE — NURSING
Report given to Daniela. Patient alseep but was told of transfer of nursing care prior to. Patient agreed.

## 2017-04-28 ENCOUNTER — DOCUMENTATION ONLY (OUTPATIENT)
Dept: FAMILY MEDICINE | Facility: CLINIC | Age: 67
End: 2017-04-28

## 2017-04-28 VITALS
HEART RATE: 47 BPM | TEMPERATURE: 97 F | WEIGHT: 293 LBS | HEIGHT: 68 IN | SYSTOLIC BLOOD PRESSURE: 121 MMHG | DIASTOLIC BLOOD PRESSURE: 84 MMHG | OXYGEN SATURATION: 92 % | RESPIRATION RATE: 16 BRPM | BODY MASS INDEX: 44.41 KG/M2

## 2017-04-28 LAB
BACTERIA BLD CULT: NORMAL
BASOPHILS # BLD AUTO: 0 K/UL
BASOPHILS NFR BLD: 0.5 %
DIFFERENTIAL METHOD: ABNORMAL
EOSINOPHIL # BLD AUTO: 0.6 K/UL
EOSINOPHIL NFR BLD: 6.9 %
ERYTHROCYTE [DISTWIDTH] IN BLOOD BY AUTOMATED COUNT: 18 %
HCT VFR BLD AUTO: 31.4 %
HGB BLD-MCNC: 9.9 G/DL
INR PPP: 1.3
LYMPHOCYTES # BLD AUTO: 1.3 K/UL
LYMPHOCYTES NFR BLD: 16.3 %
MCH RBC QN AUTO: 25.5 PG
MCHC RBC AUTO-ENTMCNC: 31.5 %
MCV RBC AUTO: 81 FL
MONOCYTES # BLD AUTO: 1 K/UL
MONOCYTES NFR BLD: 12.1 %
NEUTROPHILS # BLD AUTO: 5.3 K/UL
NEUTROPHILS NFR BLD: 64.2 %
PLATELET # BLD AUTO: 236 K/UL
PMV BLD AUTO: 8.3 FL
POCT GLUCOSE: 115 MG/DL (ref 70–110)
POCT GLUCOSE: 133 MG/DL (ref 70–110)
PROTHROMBIN TIME: 13.4 SEC
RBC # BLD AUTO: 3.88 M/UL
WBC # BLD AUTO: 8.2 K/UL

## 2017-04-28 PROCEDURE — 94761 N-INVAS EAR/PLS OXIMETRY MLT: CPT

## 2017-04-28 PROCEDURE — 25000003 PHARM REV CODE 250: Performed by: PHYSICIAN ASSISTANT

## 2017-04-28 PROCEDURE — 36415 COLL VENOUS BLD VENIPUNCTURE: CPT

## 2017-04-28 PROCEDURE — 63600175 PHARM REV CODE 636 W HCPCS: Performed by: FAMILY MEDICINE

## 2017-04-28 PROCEDURE — 85610 PROTHROMBIN TIME: CPT

## 2017-04-28 PROCEDURE — 25000003 PHARM REV CODE 250: Performed by: FAMILY MEDICINE

## 2017-04-28 PROCEDURE — 85025 COMPLETE CBC W/AUTO DIFF WBC: CPT

## 2017-04-28 PROCEDURE — 94640 AIRWAY INHALATION TREATMENT: CPT

## 2017-04-28 PROCEDURE — 27000221 HC OXYGEN, UP TO 24 HOURS

## 2017-04-28 PROCEDURE — 63600175 PHARM REV CODE 636 W HCPCS: Performed by: PHYSICIAN ASSISTANT

## 2017-04-28 RX ORDER — CEPHALEXIN 500 MG/1
500 CAPSULE ORAL EVERY 6 HOURS
Status: DISCONTINUED | OUTPATIENT
Start: 2017-04-28 | End: 2017-04-28 | Stop reason: HOSPADM

## 2017-04-28 RX ORDER — CEPHALEXIN 500 MG/1
500 CAPSULE ORAL EVERY 6 HOURS
Qty: 40 CAPSULE | Refills: 0 | Status: SHIPPED | OUTPATIENT
Start: 2017-04-28 | End: 2017-05-08 | Stop reason: SDUPTHER

## 2017-04-28 RX ORDER — ENOXAPARIN SODIUM 150 MG/ML
120 INJECTION SUBCUTANEOUS ONCE
Status: COMPLETED | OUTPATIENT
Start: 2017-04-28 | End: 2017-04-28

## 2017-04-28 RX ADMIN — LEVALBUTEROL 1.25 MG: 1.25 SOLUTION, CONCENTRATE RESPIRATORY (INHALATION) at 01:04

## 2017-04-28 RX ADMIN — POTASSIUM CHLORIDE 20 MEQ: 1500 TABLET, EXTENDED RELEASE ORAL at 09:04

## 2017-04-28 RX ADMIN — IPRATROPIUM BROMIDE 0.5 MG: 0.5 SOLUTION RESPIRATORY (INHALATION) at 07:04

## 2017-04-28 RX ADMIN — GABAPENTIN 600 MG: 300 CAPSULE ORAL at 05:04

## 2017-04-28 RX ADMIN — HYDROCODONE BITARTRATE AND ACETAMINOPHEN 1 TABLET: 10; 325 TABLET ORAL at 04:04

## 2017-04-28 RX ADMIN — FUROSEMIDE 40 MG: 40 TABLET ORAL at 09:04

## 2017-04-28 RX ADMIN — IPRATROPIUM BROMIDE 0.5 MG: 0.5 SOLUTION RESPIRATORY (INHALATION) at 01:04

## 2017-04-28 RX ADMIN — CEFAZOLIN SODIUM 1 G: 1 SOLUTION INTRAVENOUS at 09:04

## 2017-04-28 RX ADMIN — ATORVASTATIN CALCIUM 20 MG: 20 TABLET, FILM COATED ORAL at 09:04

## 2017-04-28 RX ADMIN — METOPROLOL SUCCINATE 12.5 MG: 25 TABLET, EXTENDED RELEASE ORAL at 09:04

## 2017-04-28 RX ADMIN — HYDROCODONE BITARTRATE AND ACETAMINOPHEN 1 TABLET: 10; 325 TABLET ORAL at 09:04

## 2017-04-28 RX ADMIN — ENOXAPARIN SODIUM 120 MG: 150 INJECTION SUBCUTANEOUS at 12:04

## 2017-04-28 RX ADMIN — ZINC SULFATE 220 MG (50 MG) CAPSULE 220 MG: CAPSULE at 09:04

## 2017-04-28 RX ADMIN — Medication: at 09:04

## 2017-04-28 RX ADMIN — LEVALBUTEROL 1.25 MG: 1.25 SOLUTION, CONCENTRATE RESPIRATORY (INHALATION) at 07:04

## 2017-04-28 RX ADMIN — FLUCONAZOLE 100 MG: 100 TABLET ORAL at 09:04

## 2017-04-28 RX ADMIN — CEFAZOLIN SODIUM 1 G: 1 SOLUTION INTRAVENOUS at 12:04

## 2017-04-28 RX ADMIN — ESCITALOPRAM 10 MG: 10 TABLET, FILM COATED ORAL at 09:04

## 2017-04-28 RX ADMIN — METHOCARBAMOL 750 MG: 750 TABLET ORAL at 05:04

## 2017-04-28 RX ADMIN — Medication 400 MG: at 09:04

## 2017-04-28 RX ADMIN — METHOCARBAMOL 750 MG: 750 TABLET ORAL at 12:04

## 2017-04-28 RX ADMIN — GABAPENTIN 600 MG: 300 CAPSULE ORAL at 03:04

## 2017-04-28 NOTE — PROGRESS NOTES
[facility] sent patient information to [facility] through [facility] for processing and acceptance.BERNICE Kirk    The referral for the patient in [facility] JOON/PCU, room 209, bed 209 A admitted at 4/22/2017 11:56 PM has been updated by [email or web address].  Update: Accepted.

## 2017-04-28 NOTE — ASSESSMENT & PLAN NOTE
Improving. PO keflex q6 hr for 10 days, then daily indefinitely for PPX. Will need to ask pcp for new Rx once 10 day course is finished.   Associated with venous ulcers on the abdomen in which chronic wound care will be needed. She desires to change wound care providers from [facility] to [facility]. Referral placed for clinic follow up.

## 2017-04-28 NOTE — PLAN OF CARE
Problem: Patient Care Overview  Goal: Plan of Care Review  Outcome: Ongoing (interventions implemented as appropriate)  Wade aerosol tx well bbs decreased O2 on 2L 89% room air placed back on 2l increased to 99%

## 2017-04-28 NOTE — PROGRESS NOTES
Pre-Visit Chart Review  For Appointment Scheduled on 05/08/2017    Health Maintenance Due   Topic Date Due    DEXA SCAN  11/15/1991    Zoster Vaccine  11/15/2011    Urine Microalbumin  07/17/2014

## 2017-04-28 NOTE — PROGRESS NOTES
Faxed referral for outpatient wound care to [facility] (wound care at [facility]).

## 2017-04-28 NOTE — PLAN OF CARE
Problem: Fall Risk (Adult)  Intervention: Monitor/Assist with Self Care  Pt resting eyes closed, pain management achieved with PRN pain medication.  Pt has no complaints or concerns at this time.

## 2017-04-28 NOTE — PLAN OF CARE
Discharged pt per MD order. Given d/c instruction and education on new medications with daughter at bedside. Given information on obtaining RX X1. Pt transferred off unit via w/c and home oxygen with NAD noted. Belongings with family.

## 2017-04-28 NOTE — PLAN OF CARE
04/27/17 2000   Patient Assessment/Suction   Level of Consciousness (AVPU) alert   Respiratory Effort Normal;Unlabored   Expansion/Accessory Muscles/Retractions no retractions;no use of accessory muscles   All Lung Fields Breath Sounds clear;diminished   Cough Frequency infrequent   Cough Type good;nonproductive   PRE-TX-O2-ETCO2   O2 Device (Oxygen Therapy) nasal cannula   Flow (L/min) 2   Oxygen Concentration (%) 28   SpO2 97 %   Pulse Oximetry Type Intermittent   Pulse (!) 49   Resp 20   Aerosol Therapy   $ Aerosol Therapy Charges Aerosol Treatment   Respiratory Treatment Status given   SVN/Inhaler Treatment Route mask;with oxygen   Position During Treatment HOB at 45 degrees   Patient Tolerance good   Post-Treatment   Post-treatment Heart Rate (beats/min) 52   Post-treatment Resp Rate (breaths/min) 18   All Fields Breath Sounds aeration increased   Ready to Wean/Extubation Screen   FIO2<60 (chart decimal) 0.28

## 2017-04-28 NOTE — DISCHARGE SUMMARY
[facility] Hospital Medicine  Discharge Summary      Patient Name: Nelly Tian  MRN: [id number]  Admission Date: 4/22/2017  Hospital Length of Stay: 5 days  Discharge Date and Time: 4/28/2017  4:53 PM  Attending Physician: Saba Mora MD    Discharging Provider: Saba Mora MD  Primary Care Provider: Constantine Bird MD      HPI:   Patient presents for evaluation of anxiety. She is a poor historian. She received anxiolytic prior to my interview and exam. She reports feeling anxious but can't provide time of onset. She reports using nebulized bronchodilators routinely. She reports chronic shortness of breath and states she uses supplemental oxygen at home continuously. She is unable to say if her shortness of breath has worsened. She denies peripheral edema. She reports using 3-4 pillows to sleep. She denies increased orthopnea. She reports having LEFT-sided wound which is cared for by home healthcare. She reports antibiotic regimen being finished. She denies fever or chills. She denies cough, sore throat. She denies dysuria, flank pain. She denies chest pain.    She was evaluated in the ED. She was administered anxiolytic. She was found to be in atrial fibrillation with RVR. She was administered IV diltiazem which improved her heart rate. She was admitted to telemetry unit. She became febrile. Blood culture obtained. IV Linezolid, Zosyn administered.      Indwelling Lines/Drains at time of discharge:   Lines/Drains/Airways     Central Venous Catheter Line                 Percutaneous Central Line Insertion/Assessment - triple lumen  04/23/17 1020 right internal jugular 5 days          Pressure Ulcer                 Pressure Ulcer 03/20/17 0829 Stage II 39 days              Hospital Course:   Pt transferred to ICU after she became hypotensive and febrile. She did not require pressors. She was observed overnight in the ICU. ID consulted for assistance with antibiotic regimen. Wound  care administered to ulcers on abdomen. Cellulitis improved daily with IV abx. Patient examined at bedside on day of discharge. Exam findings within normal limits. She was deemed safe for discharge.       Consults:   Consults         Status Ordering Provider     Inpatient consult to Anesthesiology  Once     Provider:  David Parks MD    Completed MAYELIN GORDILLO     Inpatient consult to General Surgery  Once     Provider:  Sadi Wilson MD    Completed MILES YO     Inpatient consult to Infectious Diseases  Once     Provider:  Opal Jacobsen MD    Completed AUDREY PAINTING     IP consult to case management  Once     Provider:  (Not yet assigned)    Completed MAYELIN GORDILLO          Significant Diagnostic Studies: Labs:   CBC   Recent Labs  Lab 04/28/17  0453   WBC 8.20   HGB 9.9*   HCT 31.4*          Pending Diagnostic Studies:     None        Final Active Diagnoses:    Diagnosis Date Noted POA    PRINCIPAL PROBLEM:  Cellulitis, abdominal wall [L03.311]  Yes    Chronic respiratory failure [J96.10] 04/23/2017 Yes    Obesity, Class III, BMI 40-49.9 (morbid obesity) [E66.01] 11/16/2016 Yes    Hyperlipidemia [E78.5] 11/02/2016 Yes    Type 2 diabetes mellitus with complication, without long-term current use of insulin [E11.8] 11/02/2016 Yes    Hypertension associated with diabetes [E11.59, I10] 01/19/2016 Yes    Chronic atrial fibrillation [I48.2] 11/10/2015 Yes    Major depression, recurrent, chronic [F33.9]  Yes      Problems Resolved During this Admission:    Diagnosis Date Noted Date Resolved POA    Hyperkalemia [E87.5] 04/23/2017 04/24/2017 Yes    Severe sepsis [A41.9, R65.20] 04/05/2017 04/27/2017 Yes      * Cellulitis, abdominal wall  Improving. PO keflex q6 hr for 10 days, then daily indefinitely for PPX. Will need to ask pcp for new Rx once 10 day course is finished.   Associated with venous ulcers on the abdomen in which chronic wound care will be needed. She desires to  change wound care providers from [facility] to [facility]. Referral placed for clinic follow up.       Major depression, recurrent, chronic  Stable. Continue Escitalopram      Chronic atrial fibrillation  - stable. continue low dose metoprolol and OAC. INR not at goal. HH to check with labs on Monday    Hypertension associated with diabetes  Resume home meds    Type 2 diabetes mellitus with complication, without long-term current use of insulin  Stable. Check BG prior to meals and QHS. Administer rapid-acting insulin according to low-dose sliding scale  Hold oral antihyperglymics    Hyperlipidemia  Continue Atorvastatin      Obesity, Class III, BMI 40-49.9 (morbid obesity)  Body mass index is 50.08 kg/(m^2). Morbid obesity complicates all aspects of disease management from diagnostic modalities to treatment. Weight loss encouraged and health benefits explained to patient.        Chronic respiratory failure  Continue supplemental oxygen as routine home use.       Discharged Condition: stable    Disposition: Home-Health Care [facility]    Follow Up:  Follow-up Information     Follow up with [facility].    Specialty:  Home Health Services    Why:  Home Health    Contact information:    [address] [place] [address] [phone]          Follow up with Constantine Bird MD In 1 week.    Specialty:  Family Medicine    Why:  will need new Rx for keflex once 10 day course is over.    Contact information:    [address] [place] [address] [phone]          Patient Instructions:     Referral to Home health   Referral Priority: Routine Referral Type: Home Health   Referral Reason: Specialty Services Required    Requested Specialty: Home Health Services    Number of Visits Requested: 1      Ambulatory Referral to Wound Clinic   Referral Priority: Routine Referral Type: Consultation   Referral Reason: Specialty Services Required    Requested Specialty: Wound Care    Number of Visits Requested: 1      Diet  Diabetic 2000 Calories     Activity as tolerated       Medications:  Reconciled Home Medications:   Discharge Medication List as of 4/28/2017  4:10 PM      START taking these medications    Details   cephALEXin (KEFLEX) 500 MG capsule Take 1 capsule (500 mg total) by mouth every 6 (six) hours., Starting 4/28/2017, Until Mon 5/8/17, Normal         CONTINUE these medications which have NOT CHANGED    Details   acetaminophen (TYLENOL) 325 MG tablet Take 2 tablets (650 mg total) by mouth every 6 (six) hours as needed., Starting 11/5/2016, Until Discontinued, OTC      albuterol (ACCUNEB) 0.63 mg/3 mL Nebu INHALE THE CONTENTS OF 1 VIAL  BY  NEBULIZER EVERY 6 HOURS AS NEEDED, Normal      albuterol-ipratropium 2.5mg-0.5mg/3mL (DUO-NEB) 0.5 mg-3 mg(2.5 mg base)/3 mL nebulizer solution INHALE THE CONTENTS OF 1 VIAL VIA NEBULIZER EVERY 6 HOURS AS NEEDED FOR  WHEEZING (DO NOT USE WITH ALBUTEROL INHALER), Normal      ascorbic acid, vitamin C, (VITAMIN C) 500 MG tablet Take 1 tablet (500 mg total) by mouth every evening., Starting 4/11/2017, Until Discontinued, OTC      atorvastatin (LIPITOR) 20 MG tablet Take 1 tablet (20 mg total) by mouth once daily., Starting 12/29/2016, Until Fri 12/29/17, Normal      budesonide (PULMICORT) 0.5 mg/2 mL nebulizer solution INHALE THE CONTENTS OF 1 VIAL VIA NEBULIZER EVERY DAY, Normal      clonazePAM (KLONOPIN) 1 MG tablet Take 1 tablet (1 mg total) by mouth 2 (two) times daily as needed for Anxiety., Starting 10/21/2016, Until Sat 10/21/17, Print      escitalopram oxalate (LEXAPRO) 20 MG tablet Take 1 tablet (20 mg total) by mouth once daily., Starting 4/19/2017, Until Discontinued, Normal      furosemide (LASIX) 40 MG tablet Take 1 tablet (40 mg total) by mouth 2 (two) times daily., Starting 2/19/2017, Until Mon 2/19/18, Normal      gabapentin (NEURONTIN) 600 MG tablet Take 1 tablet (600 mg total) by mouth 3 (three) times daily., Starting 4/20/2017, Until Fri 4/20/18, Normal       hydrocodone-acetaminophen 10-325mg (NORCO)  mg Tab Take 1 tablet by mouth every 6 (six) hours as needed for Pain., Starting 4/12/2017, Until Discontinued, Print      lisinopril (PRINIVIL,ZESTRIL) 20 MG tablet Take 0.5 tablets (10 mg total) by mouth once daily., Starting 4/11/2017, Until Discontinued, No Print      magnesium oxide (MAG-OX) 400 mg tablet Take 1 tablet (400 mg total) by mouth 2 (two) times daily., Starting 4/11/2017, Until Discontinued, OTC      metformin (GLUCOPHAGE) 500 MG tablet TAKE 1 TABLET TWICE DAILY WITH MEALS, Normal      methocarbamol (ROBAXIN) 750 MG Tab TAKE 1 TABLET FOUR TIMES DAILY, Normal      metoprolol succinate (TOPROL-XL) 25 MG 24 hr tablet Take 0.5 tablets (12.5 mg total) by mouth once daily. On hold waiting for dr visit, Starting 4/11/2017, Until u 5/11/17, No Print      miconazole NITRATE 2 % (MICOTIN) 2 % top powder Apply topically 2 (two) times daily. Skin folds, Starting 2/19/2017, Until Discontinued, OTC      multivit, Ca, min-FA-soy isofl 400-60 mcg-mg Tab Take 1 tablet by mouth once daily., Starting 4/11/2017, Until u 5/11/17, OTC      multivitamin (THERAGRAN) tablet Take 1 tablet by mouth once daily., Starting 11/5/2016, Until Discontinued, OTC      polyethylene glycol (GLYCOLAX) 17 gram/dose powder MIX 17 GRAMS IN LIQUID AND DRINK EVERY DAY AS NEEDED, Normal      potassium chloride SA (K-DUR,KLOR-CON) 20 MEQ tablet Take 1 tablet (20 mEq total) by mouth once daily., Starting 2/19/2017, Until Discontinued, Normal      SANTYL ointment APPLY  OINTMENT TOPICALLY TO AFFECTED AREA ONCE DAILY, Normal      SPIRIVA WITH HANDIHALER 18 mcg inhalation capsule INHALE THE CONTENTS OF 1 CAPSULE EVERY DAY, Normal      temazepam (RESTORIL) 30 mg capsule Take 1 capsule (30 mg total) by mouth nightly as needed., Starting 4/20/2017, Until Discontinued, Normal      VENTOLIN HFA 90 mcg/actuation inhaler INHALE TWO PUFFS BY MOUTH EVERY 6 HOURS AS NEEDED FOR WHEEZING, Normal       warfarin (COUMADIN) 5 MG tablet Take 0.5 tablets (2.5 mg total) by mouth Daily., Starting 4/11/2017, Until Discontinued, No Print         STOP taking these medications       fluconazole (DIFLUCAN) 100 MG tablet Comments:   Reason for Stopping:         magnesium hydroxide 400 mg/5 ml (MILK OF MAGNESIA) 400 mg/5 mL Susp Comments:   Reason for Stopping:         zinc sulfate (ZINCATE) 220 (50) mg capsule Comments:   Reason for Stopping:             Time spent on the discharge of patient: 42 minutes    Saba Mora MD  Department of Hospital Medicine  [facility]

## 2017-04-28 NOTE — DISCHARGE INSTRUCTIONS
Thank you for choosing [facility] for your medical care. The primary doctor who is taking care of you at the time of your discharge is Saba Mora MD.     You were admitted to the hospital with Cellulitis, abdominal wall.     Please note your discharge instructions, including diet/activity restrictions, follow-up appointments, and medication changes.  If you have any questions about your medical issues, prescriptions, or any other questions, please feel free to contact the [facility] Medicine Dept at [phone] and we will help.    If you are previously with Home health, outpatient PT/OT or under a therapy program, you are cleared to return to those programs.    Please direct all long term medication refills and follow up to your primary care provider, Constantine Bird MD. Thank you again for letting us take care of your health care needs.    Please note the following discharge instructions per your discharging physician-  Take all medications as prescribed.

## 2017-04-28 NOTE — PLAN OF CARE
04/28/17 1542   Final Note   Assessment Type Final Discharge Note   Discharge Disposition Home-Health   Discharge planning education complete? Yes

## 2017-04-28 NOTE — PROGRESS NOTES
Progress Note  Infectious Disease    Admit Date: 4/22/2017   LOS: 4 days     SUBJECTIVE:     Follow-up For:  Cellulitis of the pannus     Antibiotics     Start     Stop Route Frequency Ordered    04/26/17 0130  ceFAZolin (ANCEF) 1 gram in dextrose 5 % 50 mL IVPB (premix)      -- IV Every 8 hours (non-standard times) 04/26/17 0035          Review of Systems:  Much better today, pain is improved, erythema better. No other complaints     OBJECTIVE:     Vital Signs (Most Recent)  Temp: 97.7 °F (36.5 °C) (04/27/17 1930)  Pulse: (!) 49 (04/27/17 2000)  Resp: 20 (04/27/17 2000)  BP: 122/73 (04/27/17 1930)  SpO2: 97 % (04/27/17 2000)    Temperature Range Min/Max (Last 24H):  Temp:  [97.7 °F (36.5 °C)-98.3 °F (36.8 °C)]     I & O (Last 24H):  Intake/Output Summary (Last 24 hours) at 04/27/17 2333  Last data filed at 04/27/17 0456   Gross per 24 hour   Intake               50 ml   Output                0 ml   Net               50 ml       Physical Exam:  Alert and oriented, NAD,   Pannus with erythema which is dramatically faded, warmth and pain is better. Ulcerations are improved, shallow non purulent     Lines/Drains:       Percutaneous Central Line Insertion/Assessment - triple lumen  04/23/17 1020 right internal jugular (Active)   Dressing biopatch in place 4/27/2017  8:35 AM   Securement secured w/ sutures 4/27/2017  8:35 AM   Distal Patency/Care normal saline locked 4/27/2017  8:35 AM   Medial Patency/Care normal saline locked 4/27/2017  8:35 AM   Proximal Patency/Care normal saline locked 4/27/2017  8:35 AM   Dressing Change Due 04/30/17 4/24/2017  4:05 AM   Daily Line Review Performed 4/27/2017  8:35 AM       Laboratory:  CBC    Recent Labs  Lab 04/27/17  0443   WBC 7.30   RBC 3.91*   HGB 10.1*   HCT 31.5*      MCV 81*   MCH 25.8*   MCHC 31.9*     BMP    Recent Labs  Lab 04/27/17  0443      K 4.1      CO2 31*   BUN 10   CREATININE 0.7   CALCIUM 9.4     Microbiology Results (last 7 days)      Procedure Component Value Units Date/Time    Blood culture [[id number]] Collected:  04/23/17 0543    Order Status:  Completed Specimen:  Blood from Peripheral, Left  Hand Updated:  04/27/17 1212     Blood Culture, Routine No Growth to date     Blood Culture, Routine No Growth to date     Blood Culture, Routine No Growth to date     Blood Culture, Routine No Growth to date     Blood Culture, Routine No Growth to date    Narrative:       Aerobic and anaerobic    Urine culture [[id number]] Collected:  04/23/17 0529    Order Status:  Completed Specimen:  Urine from Urine, Catheterized Updated:  04/24/17 0758     Urine Culture, Routine No growth          ASSESSMENT/PLAN:     Active Hospital Problems    Diagnosis  POA    *Cellulitis, abdominal wall [L03.311]  Yes    Chronic respiratory failure [J96.10]  Yes    Obesity, Class III, BMI 40-49.9 (morbid obesity) [E66.01]  Yes    Hyperlipidemia [E78.5]  Yes    Type 2 diabetes mellitus with complication, without long-term current use of insulin [E11.8]  Yes    Hypertension associated with diabetes [E11.59, I10]  Yes    Chronic combined systolic and diastolic congestive heart failure [I50.42]  Yes    Chronic atrial fibrillation [I48.2]  Yes    Major depression, recurrent, chronic [F33.9]  Yes      Resolved Hospital Problems    Diagnosis Date Resolved POA    Hyperkalemia [E87.5] 04/24/2017 Yes    Severe sepsis [A41.9, R65.20] 04/27/2017 Yes     - Can change from Ancef to Keflex 500 mg po q 6 hrs for another 10 days.   - Recommend Keflex 500 mg po daily for prophylaxis in the future.   - Dr. Jacobsen to follow tomorrow.

## 2017-04-29 RX ORDER — SILVER SULFADIAZINE 10 G/1000G
CREAM TOPICAL
Qty: 80 G | Refills: 3 | Status: SHIPPED | OUTPATIENT
Start: 2017-04-29 | End: 2017-06-19 | Stop reason: ALTCHOICE

## 2017-05-01 ENCOUNTER — ANTI-COAG VISIT (OUTPATIENT)
Dept: CARDIOLOGY | Facility: CLINIC | Age: 67
End: 2017-05-01

## 2017-05-01 ENCOUNTER — PATIENT OUTREACH (OUTPATIENT)
Dept: ADMINISTRATIVE | Facility: CLINIC | Age: 67
End: 2017-05-01

## 2017-05-01 ENCOUNTER — TELEPHONE (OUTPATIENT)
Dept: MEDSURG UNIT | Facility: HOSPITAL | Age: 67
End: 2017-05-01

## 2017-05-01 DIAGNOSIS — Z79.01 LONG TERM CURRENT USE OF ANTICOAGULANT THERAPY: ICD-10-CM

## 2017-05-01 LAB — INR PPP: 1.9

## 2017-05-01 RX ORDER — POTASSIUM CHLORIDE 20 MEQ/1
20 TABLET, EXTENDED RELEASE ORAL DAILY
Qty: 30 TABLET | Refills: 1 | Status: SHIPPED | OUTPATIENT
Start: 2017-05-01 | End: 2017-05-04 | Stop reason: SDUPTHER

## 2017-05-01 NOTE — PROGRESS NOTES
Pt in hospital for wound infection; d/c on Friday on cephalexin 500mg q 6hr x 10 days; hh to get level today or tomorrow

## 2017-05-01 NOTE — TELEPHONE ENCOUNTER
----- Message from Chelsea Olivo sent at 5/1/2017  8:37 AM CDT -----  Patient needs to have her anxiety medication and Potachloride 20 mg. sent to:      [facility] [address] [place] - [address] [place] [address]  Phone: [phone] Fax: [phone]    Please call when completed at [phone]

## 2017-05-02 ENCOUNTER — TELEPHONE (OUTPATIENT)
Dept: FAMILY MEDICINE | Facility: CLINIC | Age: 67
End: 2017-05-02

## 2017-05-02 RX ORDER — CLONAZEPAM 1 MG/1
TABLET ORAL
Qty: 60 TABLET | Refills: 0 | Status: SHIPPED | OUTPATIENT
Start: 2017-05-02 | End: 2017-05-08 | Stop reason: SDUPTHER

## 2017-05-02 RX ORDER — CLONAZEPAM 1 MG/1
1 TABLET ORAL 2 TIMES DAILY PRN
Qty: 60 TABLET | Refills: 4 | OUTPATIENT
Start: 2017-05-02 | End: 2018-05-02

## 2017-05-02 NOTE — TELEPHONE ENCOUNTER
Patient has extended appointment on 5-8-17. This appointment will serves as patient's hospital follow up. Patient notified. Verbalized understanding.

## 2017-05-02 NOTE — TELEPHONE ENCOUNTER
----- Message from Ruth Lis sent at 5/2/2017 12:13 PM CDT -----  Patient checking on the status of refills on her medications. Please call patient back at [phone].       [facility] [address] [place] - [address]  [address] [place] [address]  Phone: [phone] Fax: [phone]    Thank you.

## 2017-05-02 NOTE — TELEPHONE ENCOUNTER
Received message from patient requesting update regarding refill request. Patient notified requests were forwarded to PCP, awaiting MD refill. Assured patient MD working quickly to get refills processed. Verbalized understanding.

## 2017-05-04 RX ORDER — POTASSIUM CHLORIDE 20 MEQ/1
20 TABLET, EXTENDED RELEASE ORAL DAILY
Qty: 30 TABLET | Refills: 6 | Status: SHIPPED | OUTPATIENT
Start: 2017-05-04 | End: 2018-01-31 | Stop reason: SDUPTHER

## 2017-05-08 ENCOUNTER — LAB VISIT (OUTPATIENT)
Dept: LAB | Facility: HOSPITAL | Age: 67
End: 2017-05-08
Attending: FAMILY MEDICINE
Payer: MEDICARE

## 2017-05-08 ENCOUNTER — ANTI-COAG VISIT (OUTPATIENT)
Dept: CARDIOLOGY | Facility: CLINIC | Age: 67
End: 2017-05-08

## 2017-05-08 ENCOUNTER — OFFICE VISIT (OUTPATIENT)
Dept: FAMILY MEDICINE | Facility: CLINIC | Age: 67
End: 2017-05-08
Payer: MEDICARE

## 2017-05-08 VITALS
TEMPERATURE: 98 F | BODY MASS INDEX: 44.41 KG/M2 | SYSTOLIC BLOOD PRESSURE: 138 MMHG | WEIGHT: 293 LBS | HEIGHT: 68 IN | HEART RATE: 77 BPM | DIASTOLIC BLOOD PRESSURE: 83 MMHG

## 2017-05-08 DIAGNOSIS — F17.200 TOBACCO DEPENDENCY: ICD-10-CM

## 2017-05-08 DIAGNOSIS — E11.9 DM II (DIABETES MELLITUS, TYPE II), CONTROLLED: Chronic | ICD-10-CM

## 2017-05-08 DIAGNOSIS — Z79.01 LONG TERM CURRENT USE OF ANTICOAGULANT THERAPY: ICD-10-CM

## 2017-05-08 DIAGNOSIS — E11.40 TYPE 2 DIABETES MELLITUS WITH DIABETIC NEUROPATHY, WITHOUT LONG-TERM CURRENT USE OF INSULIN: Primary | Chronic | ICD-10-CM

## 2017-05-08 DIAGNOSIS — Z11.59 NEED FOR HEPATITIS C SCREENING TEST: ICD-10-CM

## 2017-05-08 DIAGNOSIS — L89.893 DECUBITUS ULCER OF OTHER SITE, STAGE 3: ICD-10-CM

## 2017-05-08 DIAGNOSIS — M51.36 LUMBAR DEGENERATIVE DISC DISEASE: ICD-10-CM

## 2017-05-08 DIAGNOSIS — F33.9 MAJOR DEPRESSION, RECURRENT, CHRONIC: ICD-10-CM

## 2017-05-08 DIAGNOSIS — L98.491 NONHEALING SKIN ULCER, LIMITED TO BREAKDOWN OF SKIN: ICD-10-CM

## 2017-05-08 DIAGNOSIS — M43.12 SPONDYLOLISTHESIS OF CERVICAL REGION: ICD-10-CM

## 2017-05-08 LAB
ALBUMIN SERPL BCP-MCNC: 3.3 G/DL
ALP SERPL-CCNC: 80 U/L
ALT SERPL W/O P-5'-P-CCNC: 13 U/L
ANION GAP SERPL CALC-SCNC: 13 MMOL/L
AST SERPL-CCNC: 16 U/L
BILIRUB SERPL-MCNC: 0.3 MG/DL
BUN SERPL-MCNC: 13 MG/DL
CALCIUM SERPL-MCNC: 9.5 MG/DL
CHLORIDE SERPL-SCNC: 106 MMOL/L
CHOLEST/HDLC SERPL: 3.5 {RATIO}
CO2 SERPL-SCNC: 24 MMOL/L
CREAT SERPL-MCNC: 0.7 MG/DL
EST. GFR  (AFRICAN AMERICAN): >60 ML/MIN/1.73 M^2
EST. GFR  (NON AFRICAN AMERICAN): >60 ML/MIN/1.73 M^2
GLUCOSE SERPL-MCNC: 102 MG/DL
HDL/CHOLESTEROL RATIO: 28.2 %
HDLC SERPL-MCNC: 117 MG/DL
HDLC SERPL-MCNC: 33 MG/DL
INR PPP: 3.1
LDLC SERPL CALC-MCNC: 38.6 MG/DL
NONHDLC SERPL-MCNC: 84 MG/DL
POTASSIUM SERPL-SCNC: 3.6 MMOL/L
PROT SERPL-MCNC: 7.6 G/DL
SODIUM SERPL-SCNC: 143 MMOL/L
TRIGL SERPL-MCNC: 227 MG/DL

## 2017-05-08 PROCEDURE — 99213 OFFICE O/P EST LOW 20 MIN: CPT | Mod: S$GLB,,, | Performed by: FAMILY MEDICINE

## 2017-05-08 PROCEDURE — 86803 HEPATITIS C AB TEST: CPT

## 2017-05-08 PROCEDURE — 80053 COMPREHEN METABOLIC PANEL: CPT

## 2017-05-08 PROCEDURE — 1160F RVW MEDS BY RX/DR IN RCRD: CPT | Mod: S$GLB,,, | Performed by: FAMILY MEDICINE

## 2017-05-08 PROCEDURE — 99499 UNLISTED E&M SERVICE: CPT | Mod: S$GLB,,, | Performed by: FAMILY MEDICINE

## 2017-05-08 PROCEDURE — 3075F SYST BP GE 130 - 139MM HG: CPT | Mod: S$GLB,,, | Performed by: FAMILY MEDICINE

## 2017-05-08 PROCEDURE — 3044F HG A1C LEVEL LT 7.0%: CPT | Mod: S$GLB,,, | Performed by: FAMILY MEDICINE

## 2017-05-08 PROCEDURE — 36415 COLL VENOUS BLD VENIPUNCTURE: CPT | Mod: PO

## 2017-05-08 PROCEDURE — 3079F DIAST BP 80-89 MM HG: CPT | Mod: S$GLB,,, | Performed by: FAMILY MEDICINE

## 2017-05-08 PROCEDURE — 99999 PR PBB SHADOW E&M-EST. PATIENT-LVL III: CPT | Mod: PBBFAC,,, | Performed by: FAMILY MEDICINE

## 2017-05-08 PROCEDURE — 80061 LIPID PANEL: CPT

## 2017-05-08 PROCEDURE — 4010F ACE/ARB THERAPY RXD/TAKEN: CPT | Mod: S$GLB,,, | Performed by: FAMILY MEDICINE

## 2017-05-08 RX ORDER — IPRATROPIUM BROMIDE AND ALBUTEROL SULFATE 2.5; .5 MG/3ML; MG/3ML
SOLUTION RESPIRATORY (INHALATION)
Qty: 90 ML | Refills: 4 | Status: SHIPPED | OUTPATIENT
Start: 2017-05-08 | End: 2017-06-19 | Stop reason: ALTCHOICE

## 2017-05-08 RX ORDER — CLONAZEPAM 1 MG/1
1 TABLET ORAL 2 TIMES DAILY PRN
Qty: 60 TABLET | Refills: 4 | Status: SHIPPED | OUTPATIENT
Start: 2017-05-08 | End: 2017-10-25 | Stop reason: SDUPTHER

## 2017-05-08 RX ORDER — CEPHALEXIN 500 MG/1
500 CAPSULE ORAL DAILY
Qty: 90 CAPSULE | Refills: 1 | Status: SHIPPED | OUTPATIENT
Start: 2017-05-08 | End: 2017-05-11 | Stop reason: SDUPTHER

## 2017-05-08 RX ORDER — TEMAZEPAM 30 MG/1
30 CAPSULE ORAL NIGHTLY PRN
Qty: 30 CAPSULE | Refills: 3 | Status: SHIPPED | OUTPATIENT
Start: 2017-05-08 | End: 2017-07-18 | Stop reason: SDUPTHER

## 2017-05-08 RX ORDER — BUDESONIDE 0.5 MG/2ML
INHALANT ORAL
Qty: 180 ML | Refills: 4 | Status: SHIPPED | OUTPATIENT
Start: 2017-05-08 | End: 2017-06-19 | Stop reason: ALTCHOICE

## 2017-05-08 RX ORDER — HYDROCODONE BITARTRATE AND ACETAMINOPHEN 10; 325 MG/1; MG/1
1 TABLET ORAL EVERY 6 HOURS PRN
Qty: 120 TABLET | Refills: 0 | Status: SHIPPED | OUTPATIENT
Start: 2017-05-08 | End: 2017-07-18 | Stop reason: SDUPTHER

## 2017-05-08 RX ORDER — HYDROCODONE BITARTRATE AND ACETAMINOPHEN 10; 325 MG/1; MG/1
1 TABLET ORAL EVERY 6 HOURS PRN
Qty: 120 TABLET | Refills: 0 | Status: SHIPPED | OUTPATIENT
Start: 2017-06-08 | End: 2017-06-19 | Stop reason: SDUPTHER

## 2017-05-08 RX ORDER — DULOXETIN HYDROCHLORIDE 30 MG/1
30 CAPSULE, DELAYED RELEASE ORAL DAILY
Qty: 90 CAPSULE | Refills: 4 | Status: SHIPPED | OUTPATIENT
Start: 2017-05-08 | End: 2017-07-18 | Stop reason: SDUPTHER

## 2017-05-08 NOTE — MR AVS SNAPSHOT
[facility]  [address]  [place] [address]  Phone: [phone]  Fax: [phone]                  Nelly Tian   2017 2:00 PM   Office Visit    Description:  Female : 1950   Provider:  Constantine Bird MD   Department:  [place] [facility]           Reason for Visit     Hypertension     [facility]           Diagnoses this Visit        Comments    Type 2 diabetes mellitus with diabetic neuropathy, without long-term current use of insulin    -  Primary     Tobacco dependency         Major depression, recurrent, chronic         Nonhealing skin ulcer, limited to breakdown of skin         Decubitus ulcer of other site, stage 3         Lumbar degenerative disc disease         Spondylolisthesis of cervical region                To Do List           Future Appointments        Provider Department Dept Phone    2017 3:30 PM LAB, [facility] - Lab [phone]    2017 3:45 PM LAB, [facility] - Lab [phone]    2017 9:20 AM LAB, [facility] [phone]    2017 1:20 PM Laura Ramos MD [facility] [phone]    2017 1:20 PM Constantine Bird MD [facility] [phone]      Goals (5 Years of Data)              17    COMPLETED: Patient/caregiver will accept life style changes to manage and improve  CHF  prior to discharge from OPCM.           Notes - Note edited  2017  1:44 PM by Jayde Reyes    Overall Time to Completion  2 months from 2017    Short Term Goals  Patient/caregiver will discuss health care needs with Physician and care team during visits or using Patient Portal.  Interventions   · Collaborate with Physician as appropriate to meet patient needs.  · Discuss appropriate use of Home health with patient/caregiver.  · Empower patient/caregiver to discuss treatment plan with  Physician/care team.  · Provide contact information for [facility] contact information.  · Provide contact information for Outpatient Case Management contact information.  · Provide contact information for Physician office phone number.  · Encourage compliance with Physician follow-ups.   Status  · Met     Patient/caregiver will notify doctor if patient gains more than 3 pounds in one day or 5 pounds in one week within 72 hours.  Interventions   · Recognize and provide educational material (KRAMES).  · Mail Weight log for home use.   Status  · Met     Patient/caregiver will verbalize 2 food items Low Sodium within 1 month.  Interventions   · Recognize and provide educational material (KRAMES).  · Encourage Dietary Compliance.  · Review eating/nutrition habits.   Status  · Met     Patient/caregiver will verbalize 2 signs and symptoms of Congestive Heart Failure within 2 months.   Interventions   · Recognize and provide educational material (KRAMES).   Status  · Met     Patient/caregiver will verbalize understanding and will follow hospital discharge plan 1 week   Interventions   · Encourage Dietary Compliance.  · Encourage Medication Compliance.  · Encourage Smoking Cessation.   Status  · Met           Patient/caregiver will have adequate mental health support/resources prior to discharge from OPCM.   On track    On track    Notes - Note created  3/6/2017  9:42 AM by Chu Desai III, LCSW    Overall Time to Completion  2 months from 03/06/2017    Short Term Goals  Patient/caregiver will contact [employer] for guidance within 1 month.  Interventions   · Collaborate with external provider as appropriate to meet patient needs.   · Coordinate mental health services.  · Encourage communication with providers.  · Encourage compliance with medical/mental health appointments.  · Encourage family/social  and involvement in care.  · Facilitate referrals as appropriate.  · Provide crisis  intervention hotline.  · Provide information on [employer].  · Provide mental/behavioral health resource(s).  · Provide support group information.  · Provide supportive counseling.   Status  · Partially met            COMPLETED: Patient/caregiver will have an action plan in place to manage and prevent complications of falls  prior to discharge from OPCM.           Notes - Note edited  3/1/2017  1:47 PM by Jayde Reyes    Overall Time to Completion  2 months from 02/21/2017    Short Term Goals  Patient/caregiver will put in place 2 keeping room free of clutter, making sure no electrical cords placed in walk ways and making sure rooms are well lit measures to decrease the risk of patient falls within 1 month.  Interventions   · Recognize and provide educational material (SLIME).   Status  · Met             Patient/caregiver will have an action plan in place to manage and prevent complications of infectious disesase  prior to discharge from OPCM.     On track      Notes - Note edited  4/21/2017  1:56 PM by Jayde Reyes    Overall Time to Completion  1 month from 03/15/2017    Short Term Goals  Patient/caregiver will identify 2 deficits resulting from infectious disease wound  and list 2 ways to overcome those deficits within 1 month.  Interventions   · Collaborate with Physician as appropriate to meet patient needs.  · Discuss alternate Levels of Care with patient/caregiver.  · Discuss appropriate use of Home health with patient/caregiver.  · Empower patient/caregiver to discuss treatment plan with Physician/care team.  · Recognize and provide educational material (SLIME).   Status  · Partially met    Patient/caregiver will identify 1 supports or services to maintain or improve current functional status within 2 weeks.  Interventions   · Refer to [facility] wound care-appt on 03/20/17   Status  · Met     Patient/caregiver will verbalize 2 strategies to limit/reduce the risk of infections within 1  month.  Interventions   · Recognize and provide educational material (KRAMES).  · Encourage Dietary Compliance.  · Review eating/nutrition habits.  · Complete medication reconciliation.  · Encourage Medication Compliance.   Status  · Partially met    Patient/caregiver will verbalize 1 red flags of infectious disease  and know when to contact Physician within 1 month.  Interventions   · Recognize and provide educational material (KRAMES).   Status  · Partially met      Overall Time to Completion  1 month  from 04/21/2017    Short Term Goals  Patient/caregiver will verbalize understanding and will follow hospital discharge plan 3 weeks.  Interventions   · Collaborate with Post-Acute Provider regarding treatment plan.  · Empower patient/caregiver to discuss treatment plan with Physician/care team.  · Recognize and provide educational material (KRAMES).  · Encourage Dietary Compliance.  · Review eating/nutrition habits.  · Encourage Medication Compliance.  · Provide contact information for Physician office phone number.   Status  · Partially met                       COMPLETED: Patient/caregiver will have knowledge of resources available in order to obtain the services that are needed prior to discharge from OPCM.           Notes - Note edited  3/1/2017  1:47 PM by Jayde Reyes    Overall Time to Completion  2 months from 02/21/2017    Short Term Goals  Patient/caregiver will have contact information for identified community resources IE:[place] for eyeglasses, [facility], Food Pantries   for follow-up within 1 month.  Interventions   · Provide contact information for Community Connections Resource Database (Aunt Christal).   Status  · Met           COMPLETED: Patient/caregiver will have knowledge of resources available in order to obtain the services that are needed prior to discharge from O[facility].           Notes - Note edited  3/15/2017  2:57 PM by Jayde Reyes    Overall Time to Completion  1  month from 03/01/2017    Short Term Goals  Patient/caregiver will have contact information for identified community resources IE:OP[facility]  for follow-up within 2 weeks.  Interventions   · Refer to Outpatient Case Management Social Worker.   Status  · Met    Patient/caregiver will identify 2 supports or services to maintain or improve current functional status within 2 weeks.  Interventions   · Refer to Outpatient Case Management Social Worker.  · Provide contact information for Community Connections Resource Database (Aunt Christal).   Status  · Met          COMPLETED: Patient/caregiver will have knowledge of resources available in order to obtain the services that are needed prior to discharge from [facility].           Notes - Note edited  4/21/2017  1:54 PM by Jayde Reyes    Overall Time to Completion  1 month from 04/06/2017    Short Term Goals  Patient/caregiver will discuss health care needs with Physician and care team during visits or using Patient Portal.  Interventions   · Collaborate with Inpatient case management regarding Discharge Plan.   Status  · Met              These Medications        Disp Refills Start End    duloxetine (CYMBALTA) 30 MG capsule 90 capsule 4 5/8/2017 5/8/2018    Take 1 capsule (30 mg total) by mouth once daily. - Oral    Pharmacy: [facility] - [address] Ph #: [phone]       hydrocodone-acetaminophen 10-325mg (NORCO)  mg Tab 120 tablet 0 5/8/2017     Take 1 tablet by mouth every 6 (six) hours as needed for Pain. - Oral    Pharmacy: [facility] [address] [facility] Ph #: [phone]       budesonide (PULMICORT) 0.5 mg/2 mL nebulizer solution 180 mL 4 5/8/2017     INHALE THE CONTENTS OF 1 VIAL VIA NEBULIZER EVERY DAY    Pharmacy: [facility] Delivery - [facility] [address] [facility] Ph #: [phone]       temazepam (RESTORIL) 30 mg capsule 30 capsule 3 5/8/2017      Take 1 capsule (30 mg total) by mouth nightly as needed. - Oral    Pharmacy: [facility] Delivery - [facility] [address] [facility] Ph #: [phone]       clonazePAM (KLONOPIN) 1 MG tablet 60 tablet 4 5/8/2017     Take 1 tablet (1 mg total) by mouth 2 (two) times daily as needed. - Oral    Pharmacy: [facility] Delivery - [facility] [address] Ph #: [phone]       albuterol-ipratropium 2.5mg-0.5mg/3mL (DUO-NEB) 0.5 mg-3 mg(2.5 mg base)/3 mL nebulizer solution 90 mL 4 5/8/2017     INHALE THE CONTENTS OF 1 VIAL VIA NEBULIZER EVERY 6 HOURS AS NEEDED FOR  WHEEZING (DO NOT USE WITH ALBUTEROL INHALER)    Pharmacy: [facility] Delivery - [facility] [address] Ph #: [phone]       cephALEXin (KEFLEX) 500 MG capsule 90 capsule 1 5/8/2017 5/18/2017    Take 1 capsule (500 mg total) by mouth once daily at 6am. - Oral    Pharmacy: [facility] - [facility] [address] Ph #: [phone]       hydrocodone-acetaminophen 10-325mg (NORCO)  mg Tab 120 tablet 0 6/8/2017     Take 1 tablet by mouth every 6 (six) hours as needed for Pain. - Oral    Pharmacy: [facility] [address] Ph #: [phone]         [facility]     [facility] Nurse Care Line - 24/7 Assistance  Unless otherwise directed by your provider, please contact [facility], our nurse care line that is available for 24/7 assistance.     Registered nurses in the [facility] provide: appointment scheduling, clinical advisement, health education, and other advisory services.  Call: [phone] (toll free)               Medications           Message regarding Medications     Verify the changes and/or additions to your medication regime listed below are the same as discussed with your clinician today.  If any of these changes or additions are incorrect, please notify your healthcare provider.        START  taking these NEW medications        Refills    duloxetine (CYMBALTA) 30 MG capsule 4    Sig: Take 1 capsule (30 mg total) by mouth once daily.    Class: Normal    Route: Oral    hydrocodone-acetaminophen 10-325mg (NORCO)  mg Tab 0    Starting on: 6/8/2017    Sig: Take 1 tablet by mouth every 6 (six) hours as needed for Pain.    Class: Print    Route: Oral      CHANGE how you are taking these medications     Start Taking Instead of    clonazePAM (KLONOPIN) 1 MG tablet clonazePAM (KLONOPIN) 1 MG tablet    Dosage:  Take 1 tablet (1 mg total) by mouth 2 (two) times daily as needed. Dosage:  TAKE ONE TABLET BY MOUTH TWICE DAILY AS NEEDED FOR ANXIETY    Reason for Change:  Reorder     cephALEXin (KEFLEX) 500 MG capsule cephALEXin (KEFLEX) 500 MG capsule    Dosage:  Take 1 capsule (500 mg total) by mouth once daily at 6am. Dosage:  Take 1 capsule (500 mg total) by mouth every 6 (six) hours.    Reason for Change:  Reorder       STOP taking these medications     acetaminophen (TYLENOL) 325 MG tablet Take 2 tablets (650 mg total) by mouth every 6 (six) hours as needed.           Verify that the below list of medications is an accurate representation of the medications you are currently taking.  If none reported, the list may be blank. If incorrect, please contact your healthcare provider. Carry this list with you in case of emergency.           Current Medications     albuterol (ACCUNEB) 0.63 mg/3 mL Nebu INHALE THE CONTENTS OF 1 VIAL  BY  NEBULIZER EVERY 6 HOURS AS NEEDED    albuterol-ipratropium 2.5mg-0.5mg/3mL (DUO-NEB) 0.5 mg-3 mg(2.5 mg base)/3 mL nebulizer solution INHALE THE CONTENTS OF 1 VIAL VIA NEBULIZER EVERY 6 HOURS AS NEEDED FOR  WHEEZING (DO NOT USE WITH ALBUTEROL INHALER)    ascorbic acid, vitamin C, (VITAMIN C) 500 MG tablet Take 1 tablet (500 mg total) by mouth every evening.    atorvastatin (LIPITOR) 20 MG tablet Take 1 tablet (20 mg total) by mouth once daily.    budesonide (PULMICORT) 0.5 mg/2 mL  nebulizer solution INHALE THE CONTENTS OF 1 VIAL VIA NEBULIZER EVERY DAY    cephALEXin (KEFLEX) 500 MG capsule Take 1 capsule (500 mg total) by mouth once daily at 6am.    clonazePAM (KLONOPIN) 1 MG tablet Take 1 tablet (1 mg total) by mouth 2 (two) times daily as needed.    escitalopram oxalate (LEXAPRO) 20 MG tablet Take 1 tablet (20 mg total) by mouth once daily.    furosemide (LASIX) 40 MG tablet Take 1 tablet (40 mg total) by mouth 2 (two) times daily.    gabapentin (NEURONTIN) 600 MG tablet Take 1 tablet (600 mg total) by mouth 3 (three) times daily.    hydrocodone-acetaminophen 10-325mg (NORCO)  mg Tab Take 1 tablet by mouth every 6 (six) hours as needed for Pain.    hydrocodone-acetaminophen 10-325mg (NORCO)  mg Tab Starting on Jun 08, 2017. Take 1 tablet by mouth every 6 (six) hours as needed for Pain.    lisinopril (PRINIVIL,ZESTRIL) 20 MG tablet Take 0.5 tablets (10 mg total) by mouth once daily.    magnesium oxide (MAG-OX) 400 mg tablet Take 1 tablet (400 mg total) by mouth 2 (two) times daily.    metformin (GLUCOPHAGE) 500 MG tablet TAKE 1 TABLET TWICE DAILY WITH MEALS    methocarbamol (ROBAXIN) 750 MG Tab TAKE 1 TABLET FOUR TIMES DAILY    metoprolol succinate (TOPROL-XL) 25 MG 24 hr tablet Take 0.5 tablets (12.5 mg total) by mouth once daily. On hold waiting for dr visit    miconazole NITRATE 2 % (MICOTIN) 2 % top powder Apply topically 2 (two) times daily. Skin folds    multivit, Ca, min-FA-soy isofl 400-60 mcg-mg Tab Take 1 tablet by mouth once daily.    multivitamin (THERAGRAN) tablet Take 1 tablet by mouth once daily.    polyethylene glycol (GLYCOLAX) 17 gram/dose powder MIX 17 GRAMS IN LIQUID AND DRINK EVERY DAY AS NEEDED    potassium chloride SA (K-DUR,KLOR-CON) 20 MEQ tablet Take 1 tablet (20 mEq total) by mouth once daily.    SANTYL ointment APPLY  OINTMENT TOPICALLY TO AFFECTED AREA ONCE DAILY    silver sulfADIAZINE 1% (SILVADENE) 1 % cream APPLY  CREAM TOPICALLY TWICE DAILY     "SPIRIVA WITH HANDIHALER 18 mcg inhalation capsule INHALE THE CONTENTS OF 1 CAPSULE EVERY DAY    temazepam (RESTORIL) 30 mg capsule Take 1 capsule (30 mg total) by mouth nightly as needed.    VENTOLIN HFA 90 mcg/actuation inhaler INHALE TWO PUFFS BY MOUTH EVERY 6 HOURS AS NEEDED FOR WHEEZING    warfarin (COUMADIN) 5 MG tablet Take 0.5 tablets (2.5 mg total) by mouth Daily.    duloxetine (CYMBALTA) 30 MG capsule Take 1 capsule (30 mg total) by mouth once daily.           Clinical Reference Information           Your Vitals Were     BP Pulse Temp Height Weight BMI    138/83 (BP Location: Right arm, Patient Position: Sitting, BP Method: Automatic) 77 98.3 °F (36.8 °C) (Oral) 5' 8" (1.727 m) 138 kg (304 lb 3.8 oz) 46.26 kg/m2      Blood Pressure          Most Recent Value    BP  138/83      Allergies as of 5/8/2017     Dilaudid [Hydromorphone]      Immunizations Administered on Date of Encounter - 5/8/2017     None      Language Assistance Services     ATTENTION: Language assistance services are available, free of charge. Please call [phone].      ATENCIÓN: Si habla jhony, tiene a dimas disposición servicios gratuitos de asistencia lingüística. Llame al [phone].     AMOS Ý: N?u b?n nói Ti?ng Vi?t, có các d?ch v? h? tr? ngôn ng? mi?n phí dành cho b?n. G?i s? [phone].         [facility] complies with applicable Federal civil rights laws and does not discriminate on the basis of race, color, national origin, age, disability, or sex.        "

## 2017-05-09 ENCOUNTER — TELEPHONE (OUTPATIENT)
Dept: FAMILY MEDICINE | Facility: CLINIC | Age: 67
End: 2017-05-09

## 2017-05-09 LAB — HCV AB SERPL QL IA: NEGATIVE

## 2017-05-09 NOTE — TELEPHONE ENCOUNTER
----- Message from RT Kaila sent at 5/9/2017  1:34 PM CDT -----  Contact: pt    Called Pod, pt , returned Nurse Shira's missed call, thanks.

## 2017-05-09 NOTE — PHYSICIAN QUERY
PT Name: Nelly Tian  MR #: [id number]    Physician Query Form - Consultant Diagnosis Clarification     CDS/: Lora Mota RN, CDS               Contact information:[phone]  This form is a permanent document in the medical record.     Query Date: May 9, 2017      By submitting this query, we are merely seeking further clarification of documentation.  Please utilize your independent clinical judgment when addressing the question(s) below.      The Medical record contains the following:   Diagnosis Supporting Clinical Information Location in Medical Record   Septic shock Clinical Impression : Septic shock    PMH :Septic shock 4/23/17    PMH: Septic shock 4/23/17    Transfer to ICU given mild hypotension     hold Lasix and ACEI in setting of sepsis   - one liter bolus given sepsis and hypotension     BP 88/40  MAP 58    BP 77/48  MAP 58   ED Prov note 4/23    General surgery 4/23    ID consult 4/25    [facility] Medicine 4/23    [facility] Medicine 4/23      VS Flowsheet 4/24 3:27 PM    VS Flowsheet  4/23 09:44 AM             Do you agree with the Consultants diagnosis of ____Septic Shock__________?                 [   ]   Yes               [   ]   Yes, but it resolved prior to my assessment of the patient               [x   ]   No                [   ]   Clinically insignificant               [   ]   Clinically undetermined               [   ]   Other/Clarification of findings: ___________________________________________

## 2017-05-09 NOTE — TELEPHONE ENCOUNTER
Call placed to patient for notification of Keflex sent to pharmacy. No answer received. Left message to call office.

## 2017-05-09 NOTE — TELEPHONE ENCOUNTER
----- Message from Shira Joshi sent at 5/9/2017  9:08 AM CDT -----  Patient needs a refill of medication:LOULOU Hinojosa called into [facility] pharmacy on [address]. Please order and call patient back at [phone] to confirm. Thanks!

## 2017-05-10 ENCOUNTER — TELEPHONE (OUTPATIENT)
Dept: FAMILY MEDICINE | Facility: CLINIC | Age: 67
End: 2017-05-10

## 2017-05-10 RX ORDER — METHOCARBAMOL 750 MG/1
TABLET, FILM COATED ORAL
Qty: 360 TABLET | Refills: 1 | Status: SHIPPED | OUTPATIENT
Start: 2017-05-10 | End: 2017-07-18 | Stop reason: ALTCHOICE

## 2017-05-10 NOTE — TELEPHONE ENCOUNTER
Patient notified prescription for Keflex was sent to mail order pharmacy and should arrive soon. Patient states she only have enough antibiotics on hand for one more day. Requesting at least 10 tablets be sent to local pharmacy. Please advise.

## 2017-05-10 NOTE — TELEPHONE ENCOUNTER
----- Message from Saskia Sun sent at 5/10/2017 12:31 PM CDT -----  Contact: self  Patient is calling regarding her prescription of Keflex. Patient state the pharmacy has not received it. Please call in to [facility] pharmacy at [phone]. Please call patient at [phone]. Thanks!  [facility] [address]  [place] - [address]  [address] [place] [address]  Phone: [phone] Fax: [phone]

## 2017-05-11 ENCOUNTER — OUTPATIENT CASE MANAGEMENT (OUTPATIENT)
Dept: ADMINISTRATIVE | Facility: OTHER | Age: 67
End: 2017-05-11

## 2017-05-11 RX ORDER — TRAZODONE HYDROCHLORIDE 150 MG/1
TABLET ORAL
Qty: 90 TABLET | Refills: 3 | Status: SHIPPED | OUTPATIENT
Start: 2017-05-11 | End: 2017-07-18 | Stop reason: SDUPTHER

## 2017-05-11 RX ORDER — CEPHALEXIN 500 MG/1
500 CAPSULE ORAL DAILY
Qty: 30 CAPSULE | Refills: 1 | Status: SHIPPED | OUTPATIENT
Start: 2017-05-11 | End: 2017-05-21

## 2017-05-11 RX ORDER — AMOXICILLIN AND CLAVULANATE POTASSIUM 875; 125 MG/1; MG/1
TABLET, FILM COATED ORAL
Qty: 20 TABLET | Refills: 0 | OUTPATIENT
Start: 2017-05-11

## 2017-05-11 NOTE — PROGRESS NOTES
"5/11/17: LCSW contacted pt for follow up.  Pt stated she was on her way to wound care clinic.  Pt did not have any further social needs or concerns at this time.    4/27/17: LCSW contacted pt's daughter Maye to give her the number to [employer] [facility].  LCSW explained to Maye how she would have to apply for the [employer] waiver if she is interested in possibly becoming a paid caregiver.  Maye self-reports her mother moving out of ICU.  LCSW will follow up with pt at a later date.    4/11/17: LCSW contacted pt for follow up.  Pt is still in hospital due to sepsis; she believes she may be discharged today.  Pt self-reports feeling better; she believes her PCP put in orders for her to receive a caregiver.  Pt would like her daughter to be a paid caregiver through a personal care agency.  LCSW contacted [facility] ([phone]) to inquire about pt's daughter becoming a paid caregiver.  LCSW was told by [facility] representative how pt's daughter could call their number to apply for the [employer] waiver and they will mail her a list of provider agencies if she qualifies.            3/29/17: LCSW attempted to contact pt for follow up.  Pt answered but asked if LCSW could call her back in 15 minutes.  LCSW attempted to call pt back in 15 minutes; no answer, left voicemail.      3/21/17: LCSW contacted pt for follow up.  Pt self-reports recently being discharged from hospital due to skin ulcer.  Pt stated she will begin wound care at [facility] on Thursday.  Pt self-reports her depression as about the same.  LCSW asked pt if she would consider seeing a therapist for her depression and she said she would not; pt said, "I have too many things going on right now and I do not think I could handle another appointment at this time."    3/17/17: LCSW attempted to contact pt for follow up; no answer, left voicemail.  LCSW will send pt an "attempted to " "contact" message via pt portal.      3/13/17: LCSW attempted to contact pt for follow up; no answer, left voicemail.  LCSW did read encounter about pt being admitted to ED for abdominal wall ulcer with fat layer exposed on 3/10/17.    3/6/17: LCSW contacted pt to complete SW assessment.  This is a 67 yo female pt who lives with her daughter and grandchild.  Pt is a  and her late  served in the [employer]; they have not accessed the [facility] for any [facility].  Pt has a hx of major depression chronic and anxiety.  Pt self-reports her depression coming as a result of losing some functionality approximately six years ago.  Pt states how she used to be someone who was "constantly on the go, and to not be able to do that anymore has caused me to have depression." Pt is not currently receiving counseling or therapy for depression but she does take Lexapro as prescribed.  Pt self-reports being able to perform most of her ADL's but she does occasionally receive assistance from her daughter and grandchild.      3/3/17: LCSW attempted to contact pt; no answer, left voicemail.  "

## 2017-05-12 ENCOUNTER — TELEPHONE (OUTPATIENT)
Dept: FAMILY MEDICINE | Facility: CLINIC | Age: 67
End: 2017-05-12

## 2017-05-12 NOTE — TELEPHONE ENCOUNTER
----- Message from Jannie Hansen sent at 5/12/2017  9:23 AM CDT -----  Contact: self  Patient called regarding refill of medication. Stating was to be submitted and hasn't only has 1 pill left. Please submit to local pharmacy. Please contact [phone] (home)     cephALEXin (KEFLEX) 500 MG capsule    [facility] [address]  [address] [place] [address]  Phone: [phone] Fax: [phone]

## 2017-05-15 ENCOUNTER — OUTPATIENT CASE MANAGEMENT (OUTPATIENT)
Dept: ADMINISTRATIVE | Facility: OTHER | Age: 67
End: 2017-05-15

## 2017-05-15 ENCOUNTER — CLINICAL SUPPORT (OUTPATIENT)
Dept: SMOKING CESSATION | Facility: CLINIC | Age: 67
End: 2017-05-15
Payer: COMMERCIAL

## 2017-05-15 DIAGNOSIS — F17.200 NICOTINE DEPENDENCE: Primary | ICD-10-CM

## 2017-05-15 PROCEDURE — 99406 BEHAV CHNG SMOKING 3-10 MIN: CPT | Mod: S$GLB,,,

## 2017-05-17 ENCOUNTER — ANTI-COAG VISIT (OUTPATIENT)
Dept: CARDIOLOGY | Facility: CLINIC | Age: 67
End: 2017-05-17

## 2017-05-17 DIAGNOSIS — Z79.01 LONG TERM CURRENT USE OF ANTICOAGULANT THERAPY: ICD-10-CM

## 2017-05-17 LAB — INR PPP: 2.5

## 2017-05-22 ENCOUNTER — CLINICAL SUPPORT (OUTPATIENT)
Dept: SMOKING CESSATION | Facility: CLINIC | Age: 67
End: 2017-05-22
Payer: COMMERCIAL

## 2017-05-22 ENCOUNTER — OUTPATIENT CASE MANAGEMENT (OUTPATIENT)
Dept: ADMINISTRATIVE | Facility: OTHER | Age: 67
End: 2017-05-22

## 2017-05-22 DIAGNOSIS — F17.200 NICOTINE DEPENDENCE: Primary | ICD-10-CM

## 2017-05-22 PROCEDURE — 99403 PREV MED CNSL INDIV APPRX 45: CPT | Mod: S$GLB,,,

## 2017-05-22 PROCEDURE — 99999 PR PBB SHADOW E&M-EST. PATIENT-LVL II: CPT | Mod: PBBFAC,,,

## 2017-05-22 RX ORDER — IBUPROFEN 200 MG
1 TABLET ORAL DAILY
Qty: 14 PATCH | Refills: 0 | Status: SHIPPED | OUTPATIENT
Start: 2017-05-22 | End: 2017-06-12 | Stop reason: SDUPTHER

## 2017-05-22 NOTE — PROGRESS NOTES
Individual Follow-Up Form    5/22/2017    Quit Date:     Clinical Status of Patient: Outpatient    Length of Service: 30 minutes    Continuing Medication: yes  Patches    Other Medications:      Target Symptoms: Withdrawal and medication side effects. The following were  rated moderate (3) to severe (4) on TCRS:  · Moderate (3): desire and crave - we discussed habit   · Severe (4): none    Comments: Patient is smoking about 20 cigarettes per day. She has not started nicotine patches yet, she stated that she is ready to quit. She is motivated to have a surgical procedure and the physician states she need to quit smoking prior. We dicussed and reviewed: orientation, client introductions, completion of TCRS (Tobacco Cessation Rating Scale) learned addiction model, cues/triggers, personal reasons for quitting, medications, goals, & quit date. She will begin her prescribed tobacco cessation regimen of 21 mg nicotine patch QD, she has requested nicoderm CQ name brand due to intolerance. I had submitted prior authorization when she first enrolled in March 2017. I will fax a copy of this document to the [facility] [employer] [facility] pharmacy. The patient will continue to come to the clinic for additional support and encouragement, scheduled to return on 6/5/2017.         Diagnosis: F17.200    Next Visit: 2 weeks

## 2017-05-22 NOTE — PROGRESS NOTES
The  Abdominal wound is cleansed, debrided of foreign material as much as possible, and dressed. The patient is alerted to watch for any signs of infection (redness, pus, pain, increased swelling or fever) and call if such occurs. Home wound care instructions are provided. Tetanus vaccination status reviewed: last tetanus booster within 10 years.  Patient Active Problem List   Diagnosis    Diabetes mellitus with neuropathy    Long term current use of anticoagulant therapy    Arthritis    Major depression, recurrent, chronic    DM II (diabetes mellitus, type II), controlled    BMI 36.0-36.9,adult    GERD (gastroesophageal reflux disease)    Tobacco dependency    Asthma    Chronic atrial fibrillation    Hypertension associated with diabetes    Morbid obesity with BMI of 45.0-49.9, adult    PVD (peripheral vascular disease)    Abdominal aortic atherosclerosis    Dependency on pain medication    History of MI (myocardial infarction)    Iron deficiency anemia    Hepatomegaly    DDD (degenerative disc disease), lumbar    Anxiety    Type 2 diabetes mellitus with complication, without long-term current use of insulin    Hyperlipidemia    NSAID long-term use    Osteoarthritis of right hip    Mixed restrictive and obstructive lung disease    Hypercapnic respiratory failure, chronic    Chronic respiratory failure with hypoxia    Obesity, Class III, BMI 40-49.9 (morbid obesity)    Yeast dermatitis    Nonhealing skin ulcer, limited to breakdown of skin    Pressure ulcer, stage 3    Infected wound    COPD (chronic obstructive pulmonary disease)    Microcytic anemia    Cellulitis, abdominal wall    Constipation    Chronic respiratory failure     Cardiovascular ROS: no chest pain or dyspnea on exertion  negative for - dyspnea on exertion, edema, irregular heartbeat, loss of consciousness or paroxysmal nocturnal dyspnea  The patient appears alert, well appearing, and in no distress, oriented to  person, place, and time and morbidly obese. ENT: ENT exam normal, no neck nodes or sinus tenderness, neck without nodes, neck has bilateral anterior cervical nodes enlarged and pharynx erythematous without exudate. Chest:clear to auscultation, no wheezes, rales or rhonchi, symmetric air entry, no tachypnea, retractions or cyanosis. Heart sounds are normal. Abdomen soft, nontender, no masses , visceral hernia, and cellulitis organomegaly.  Lab Results   Component Value Date    HGBA1C 6.5 (H) 04/05/2017       Type 2 diabetes mellitus with diabetic neuropathy, without long-term current use of insulin    Tobacco dependency    Major depression, recurrent, chronic    Nonhealing skin ulcer, limited to breakdown of skin    Decubitus ulcer of other site, stage 3    Lumbar degenerative disc disease  -     hydrocodone-acetaminophen 10-325mg (NORCO)  mg Tab; Take 1 tablet by mouth every 6 (six) hours as needed for Pain.  Dispense: 120 tablet; Refill: 0  -     hydrocodone-acetaminophen 10-325mg (NORCO)  mg Tab; Take 1 tablet by mouth every 6 (six) hours as needed for Pain.  Dispense: 120 tablet; Refill: 0    Spondylolisthesis of cervical region  -     hydrocodone-acetaminophen 10-325mg (NORCO)  mg Tab; Take 1 tablet by mouth every 6 (six) hours as needed for Pain.  Dispense: 120 tablet; Refill: 0  -     hydrocodone-acetaminophen 10-325mg (NORCO)  mg Tab; Take 1 tablet by mouth every 6 (six) hours as needed for Pain.  Dispense: 120 tablet; Refill: 0    Other orders  -     duloxetine (CYMBALTA) 30 MG capsule; Take 1 capsule (30 mg total) by mouth once daily.  Dispense: 90 capsule; Refill: 4  -     budesonide (PULMICORT) 0.5 mg/2 mL nebulizer solution; INHALE THE CONTENTS OF 1 VIAL VIA NEBULIZER EVERY DAY  Dispense: 180 mL; Refill: 4  -     temazepam (RESTORIL) 30 mg capsule; Take 1 capsule (30 mg total) by mouth nightly as needed.  Dispense: 30 capsule; Refill: 3  -     clonazePAM (KLONOPIN) 1 MG tablet;  Take 1 tablet (1 mg total) by mouth 2 (two) times daily as needed.  Dispense: 60 tablet; Refill: 4  -     albuterol-ipratropium 2.5mg-0.5mg/3mL (DUO-NEB) 0.5 mg-3 mg(2.5 mg base)/3 mL nebulizer solution; INHALE THE CONTENTS OF 1 VIAL VIA NEBULIZER EVERY 6 HOURS AS NEEDED FOR  WHEEZING (DO NOT USE WITH ALBUTEROL INHALER)  Dispense: 90 mL; Refill: 4  -     Discontinue: cephALEXin (KEFLEX) 500 MG capsule; Take 1 capsule (500 mg total) by mouth once daily at 6am.  Dispense: 90 capsule; Refill: 1

## 2017-05-22 NOTE — Clinical Note
Patient is smoking about 20 cigarettes per day. She has not started nicotine patches yet, she stated that she is ready to quit. She is motivated to have a surgical procedure and the physician states she need to quit smoking prior. We dicussed and reviewed: orientation, client introductions, completion of TCRS (Tobacco Cessation Rating Scale) learned addiction model, cues/triggers, personal reasons for quitting, medications, goals, & quit date. She will begin her prescribed tobacco cessation regimen of 21 mg nicotine patch QD, she has requested nicoderm CQ name brand due to intolerance. I had submitted prior authorization when she first enrolled in March 2017. I will fax a copy of this document to the [facility] pharmacy. The patient will continue to come to the clinic for additional support and encouragement, scheduled to return on 6/5/2017.

## 2017-05-22 NOTE — PROGRESS NOTES
05/22/17-Called and patient is on her way to see a doctor. Patient is going to [facility] for wound care. Patient with no further need for OPCM. Will close case and dis enroll patient at this time.

## 2017-05-23 ENCOUNTER — OUTPATIENT CASE MANAGEMENT (OUTPATIENT)
Dept: ADMINISTRATIVE | Facility: OTHER | Age: 67
End: 2017-05-23

## 2017-05-23 NOTE — PROGRESS NOTES
Please note this patient was mailed a Patient Satisfaction Discharge Survey on 5-23-17        Thank you,      Francine Nolasco, SSC

## 2017-05-31 ENCOUNTER — ANTI-COAG VISIT (OUTPATIENT)
Dept: CARDIOLOGY | Facility: CLINIC | Age: 67
End: 2017-05-31

## 2017-05-31 DIAGNOSIS — Z79.01 LONG TERM CURRENT USE OF ANTICOAGULANT THERAPY: ICD-10-CM

## 2017-05-31 LAB — INR PPP: 1.9

## 2017-06-02 RX ORDER — CLONAZEPAM 1 MG/1
TABLET ORAL
Qty: 60 TABLET | Refills: 0 | Status: SHIPPED | OUTPATIENT
Start: 2017-06-02 | End: 2017-10-25 | Stop reason: SDUPTHER

## 2017-06-02 RX ORDER — CEPHALEXIN 500 MG/1
CAPSULE ORAL
Qty: 40 CAPSULE | Refills: 0 | OUTPATIENT
Start: 2017-06-02

## 2017-06-05 ENCOUNTER — LAB VISIT (OUTPATIENT)
Dept: LAB | Facility: HOSPITAL | Age: 67
End: 2017-06-05
Attending: INTERNAL MEDICINE
Payer: MEDICARE

## 2017-06-05 DIAGNOSIS — I48.91 ATRIAL FIBRILLATION: Primary | ICD-10-CM

## 2017-06-05 LAB
BASOPHILS # BLD AUTO: 0.03 K/UL
BASOPHILS NFR BLD: 0.4 %
DIFFERENTIAL METHOD: ABNORMAL
EOSINOPHIL # BLD AUTO: 0.4 K/UL
EOSINOPHIL NFR BLD: 5.2 %
ERYTHROCYTE [DISTWIDTH] IN BLOOD BY AUTOMATED COUNT: 17.7 %
HCT VFR BLD AUTO: 38.5 %
HGB BLD-MCNC: 10.8 G/DL
IRON SERPL-MCNC: 19 UG/DL
LYMPHOCYTES # BLD AUTO: 1.6 K/UL
LYMPHOCYTES NFR BLD: 20.3 %
MCH RBC QN AUTO: 25.3 PG
MCHC RBC AUTO-ENTMCNC: 28.1 %
MCV RBC AUTO: 90 FL
MONOCYTES # BLD AUTO: 0.8 K/UL
MONOCYTES NFR BLD: 10.1 %
NEUTROPHILS # BLD AUTO: 5.1 K/UL
NEUTROPHILS NFR BLD: 63.5 %
PLATELET # BLD AUTO: 333 K/UL
PMV BLD AUTO: 10.4 FL
RBC # BLD AUTO: 4.27 M/UL
SATURATED IRON: 5 %
TOTAL IRON BINDING CAPACITY: 376 UG/DL
TRANSFERRIN SERPL-MCNC: 254 MG/DL
WBC # BLD AUTO: 7.95 K/UL

## 2017-06-05 PROCEDURE — 85025 COMPLETE CBC W/AUTO DIFF WBC: CPT

## 2017-06-05 PROCEDURE — 83540 ASSAY OF IRON: CPT

## 2017-06-08 ENCOUNTER — TELEPHONE (OUTPATIENT)
Dept: HEMATOLOGY/ONCOLOGY | Facility: CLINIC | Age: 67
End: 2017-06-08

## 2017-06-08 NOTE — TELEPHONE ENCOUNTER
Spoke with patient and rescheduled as she is sick.  Her labs are completed and will f/up next week.

## 2017-06-08 NOTE — TELEPHONE ENCOUNTER
----- Message from Saskia Sun sent at 6/8/2017 11:05 AM CDT -----  Contact: self  Patient needs to reschedule 06/09/17 appointment. susanna call patient at [phone]. Thanks!

## 2017-06-08 NOTE — TELEPHONE ENCOUNTER
Called telephone number listed and was told had the wrong number, called the numbers on file and no answer or voicemail.  Will keep trying to reach patient.  She will need her office and lab appt rescheduled.

## 2017-06-12 ENCOUNTER — TELEPHONE (OUTPATIENT)
Dept: FAMILY MEDICINE | Facility: CLINIC | Age: 67
End: 2017-06-12

## 2017-06-12 ENCOUNTER — CLINICAL SUPPORT (OUTPATIENT)
Dept: SMOKING CESSATION | Facility: CLINIC | Age: 67
End: 2017-06-12
Payer: COMMERCIAL

## 2017-06-12 ENCOUNTER — ANTI-COAG VISIT (OUTPATIENT)
Dept: CARDIOLOGY | Facility: CLINIC | Age: 67
End: 2017-06-12

## 2017-06-12 DIAGNOSIS — N76.1 CHRONIC VAGINITIS: Primary | ICD-10-CM

## 2017-06-12 DIAGNOSIS — F17.200 NICOTINE DEPENDENCE: Primary | ICD-10-CM

## 2017-06-12 DIAGNOSIS — Z79.01 LONG TERM CURRENT USE OF ANTICOAGULANT THERAPY: ICD-10-CM

## 2017-06-12 LAB — INR PPP: 3.3

## 2017-06-12 PROCEDURE — 99407 BEHAV CHNG SMOKING > 10 MIN: CPT | Mod: S$GLB,,,

## 2017-06-12 RX ORDER — IBUPROFEN 200 MG
1 TABLET ORAL DAILY
Qty: 14 PATCH | Refills: 0 | Status: SHIPPED | OUTPATIENT
Start: 2017-06-12 | End: 2017-10-25

## 2017-06-12 RX ORDER — NYSTATIN 100000 U/G
CREAM TOPICAL 2 TIMES DAILY
Qty: 30 G | Refills: 2 | Status: SHIPPED | OUTPATIENT
Start: 2017-06-12 | End: 2017-06-19 | Stop reason: ALTCHOICE

## 2017-06-12 RX ORDER — NYSTATIN 500000 [USP'U]/1
500000 TABLET, COATED ORAL EVERY 8 HOURS
Qty: 30 TABLET | Refills: 0 | Status: SHIPPED | OUTPATIENT
Start: 2017-06-12 | End: 2017-06-22

## 2017-06-12 NOTE — TELEPHONE ENCOUNTER
----- Message from Ernestine Salcido sent at 6/12/2017  2:58 PM CDT -----  Chiquis ( [facility]) called regarding the pt having a yeast probably looking for medication for that/pls call back at [phone]

## 2017-06-12 NOTE — TELEPHONE ENCOUNTER
Orders for Mycostatin cream and tablets ordered. This was fully explained to Chiquis with [facility] ([phone]) by Dr. Bird.

## 2017-06-12 NOTE — TELEPHONE ENCOUNTER
Spoke with Chiquis with [facility] who states patient saw infectious disease and was prescribed a maintenance does of antibiotics. Patient subsequently developed yeast infection and was prescribed Diflucan 200 mg daily x's 7 days. Patient is half way through the order for Diflucan and is requesting to know if additional Diflucan should be ordered. Also requesting a prescription for Nystatin powder. States needs prescription because she can not afford to purchase OTC. Mrs. Sim reports the infectious disease doctor the patient was seeing is out of the office on maternity leave.

## 2017-06-13 ENCOUNTER — TELEPHONE (OUTPATIENT)
Dept: HEMATOLOGY/ONCOLOGY | Facility: CLINIC | Age: 67
End: 2017-06-13

## 2017-06-13 NOTE — TELEPHONE ENCOUNTER
----- Message from Brissa Meyers sent at 6/13/2017  8:20 AM CDT -----  Contact: Patient  Nelly, patient [phone], Patient is calling to reschedule today's 11 am appointment. She is not feeling well. Please advise. Thanks.

## 2017-06-14 RX ORDER — BUPROPION HYDROCHLORIDE 75 MG/1
75 TABLET ORAL 2 TIMES DAILY
Qty: 60 TABLET | Refills: 1 | Status: SHIPPED | OUTPATIENT
Start: 2017-06-14 | End: 2017-10-25

## 2017-06-14 NOTE — PROGRESS NOTES
Pt states she took her normal 5mg on 06/12 and 06/13 and she states she stopped taking the duiflucan yesterday what would you like her to take ?

## 2017-06-16 ENCOUNTER — DOCUMENTATION ONLY (OUTPATIENT)
Dept: FAMILY MEDICINE | Facility: CLINIC | Age: 67
End: 2017-06-16

## 2017-06-16 NOTE — PROGRESS NOTES
Pre-Visit Chart Review  For Appointment Scheduled on 06/19/2017    Health Maintenance Due   Topic Date Due    DEXA SCAN  11/15/1991    Zoster Vaccine  11/15/2011    Eye Exam  06/02/2017    Mammogram  07/15/2017

## 2017-06-19 ENCOUNTER — OFFICE VISIT (OUTPATIENT)
Dept: FAMILY MEDICINE | Facility: CLINIC | Age: 67
End: 2017-06-19
Payer: MEDICARE

## 2017-06-19 VITALS
HEIGHT: 68 IN | HEART RATE: 75 BPM | BODY MASS INDEX: 44.41 KG/M2 | WEIGHT: 293 LBS | SYSTOLIC BLOOD PRESSURE: 112 MMHG | DIASTOLIC BLOOD PRESSURE: 56 MMHG | TEMPERATURE: 98 F

## 2017-06-19 DIAGNOSIS — M51.36 LUMBAR DEGENERATIVE DISC DISEASE: ICD-10-CM

## 2017-06-19 DIAGNOSIS — M81.8 OSTEOPOROSIS DUE TO AROMATASE INHIBITOR: Primary | ICD-10-CM

## 2017-06-19 DIAGNOSIS — E11.21 CONTROLLED TYPE 2 DIABETES MELLITUS WITH DIABETIC NEPHROPATHY, WITHOUT LONG-TERM CURRENT USE OF INSULIN: ICD-10-CM

## 2017-06-19 DIAGNOSIS — N76.1 CHRONIC VAGINITIS: ICD-10-CM

## 2017-06-19 DIAGNOSIS — M43.12 SPONDYLOLISTHESIS OF CERVICAL REGION: ICD-10-CM

## 2017-06-19 DIAGNOSIS — T38.6X5A OSTEOPOROSIS DUE TO AROMATASE INHIBITOR: Primary | ICD-10-CM

## 2017-06-19 PROCEDURE — 99214 OFFICE O/P EST MOD 30 MIN: CPT | Mod: S$GLB,,, | Performed by: FAMILY MEDICINE

## 2017-06-19 PROCEDURE — 4010F ACE/ARB THERAPY RXD/TAKEN: CPT | Mod: S$GLB,,, | Performed by: FAMILY MEDICINE

## 2017-06-19 PROCEDURE — 3044F HG A1C LEVEL LT 7.0%: CPT | Mod: S$GLB,,, | Performed by: FAMILY MEDICINE

## 2017-06-19 PROCEDURE — 99999 PR PBB SHADOW E&M-EST. PATIENT-LVL IV: CPT | Mod: PBBFAC,,, | Performed by: FAMILY MEDICINE

## 2017-06-19 PROCEDURE — 99499 UNLISTED E&M SERVICE: CPT | Mod: S$GLB,,, | Performed by: FAMILY MEDICINE

## 2017-06-19 RX ORDER — HYDROCODONE BITARTRATE AND ACETAMINOPHEN 10; 325 MG/1; MG/1
1 TABLET ORAL EVERY 6 HOURS PRN
Qty: 120 TABLET | Refills: 0 | Status: SHIPPED | OUTPATIENT
Start: 2017-07-19 | End: 2017-07-18 | Stop reason: SDUPTHER

## 2017-06-19 RX ORDER — HYDROCODONE BITARTRATE AND ACETAMINOPHEN 10; 325 MG/1; MG/1
1 TABLET ORAL EVERY 6 HOURS PRN
Qty: 120 TABLET | Refills: 0 | Status: SHIPPED | OUTPATIENT
Start: 2017-06-19 | End: 2017-07-18 | Stop reason: SDUPTHER

## 2017-06-19 RX ORDER — LISINOPRIL 20 MG/1
10 TABLET ORAL DAILY
Start: 2017-06-19 | End: 2017-09-07 | Stop reason: SDUPTHER

## 2017-06-19 RX ORDER — NYSTATIN 100000 [USP'U]/G
POWDER TOPICAL 4 TIMES DAILY
Qty: 120 G | Refills: 3 | Status: SHIPPED | OUTPATIENT
Start: 2017-06-19 | End: 2017-12-01 | Stop reason: SDUPTHER

## 2017-06-19 NOTE — PROGRESS NOTES
Subjective:       Patient ID: Nelly Tian is a 66 y.o. female.    Chief Complaint: Diabetes    Diabetes   She presents for her follow-up diabetic visit. She has type 2 diabetes mellitus. Her disease course has been improving. Pertinent negatives for hypoglycemia include no confusion, dizziness, headaches, nervousness/anxiousness, pallor, seizures, speech difficulty or tremors. There are no diabetic associated symptoms. Pertinent negatives for diabetes include no chest pain, no polydipsia, no polyphagia and no polyuria. There are no hypoglycemic complications. Diabetic complications include autonomic neuropathy and nephropathy. Risk factors for coronary artery disease include diabetes mellitus, hypertension, sedentary lifestyle, post-menopausal, stress and obesity. Current diabetic treatment includes insulin injections. She has had a previous visit with a dietitian. Her breakfast blood glucose is taken between 8-9 am. Her breakfast blood glucose range is generally 130-140 mg/dl. An ACE inhibitor/angiotensin II receptor blocker is being taken. She does not see a podiatrist.Eye exam is not current.     Review of Systems   Constitutional: Negative.  Negative for activity change, appetite change, chills and diaphoresis.   HENT: Negative.  Negative for facial swelling, hearing loss, nosebleeds, postnasal drip, sneezing and trouble swallowing.    Eyes: Negative for photophobia, pain, discharge, redness, itching and visual disturbance.   Respiratory: Positive for shortness of breath. Negative for apnea, cough, chest tightness and wheezing.    Cardiovascular: Negative.  Negative for chest pain, palpitations and leg swelling.   Gastrointestinal: Positive for abdominal distention. Negative for abdominal pain, anal bleeding, blood in stool and diarrhea.   Endocrine: Negative.  Negative for cold intolerance, heat intolerance, polydipsia, polyphagia and polyuria.   Genitourinary: Positive for urgency. Negative for  difficulty urinating, dysuria, frequency, genital sores, hematuria, pelvic pain, vaginal bleeding and vaginal discharge.   Musculoskeletal: Positive for arthralgias and back pain. Negative for gait problem, neck pain and neck stiffness.   Skin: Negative.  Negative for color change, pallor and rash.        Skin ulcer, ventral hernia   Allergic/Immunologic: Negative.  Negative for environmental allergies and food allergies.   Neurological: Negative.  Negative for dizziness, tremors, seizures, syncope, speech difficulty, numbness and headaches.   Hematological: Negative for adenopathy.   Psychiatric/Behavioral: Positive for decreased concentration and dysphoric mood. Negative for agitation, behavioral problems, confusion, hallucinations, self-injury and sleep disturbance. The patient is not nervous/anxious and is not hyperactive.        Objective:      Physical Exam   Constitutional: She is oriented to person, place, and time. She appears well-developed and well-nourished.   HENT:   Head: Normocephalic and atraumatic.   Right Ear: External ear normal.   Left Ear: External ear normal.   Nose: Nose normal.   Mouth/Throat: No oropharyngeal exudate.   Eyes: Conjunctivae and EOM are normal. Pupils are equal, round, and reactive to light. Right eye exhibits no discharge. Left eye exhibits no discharge. No scleral icterus.   Neck: Normal range of motion. Neck supple. No JVD present. No tracheal deviation present. No thyromegaly present.   Cardiovascular: Normal rate, normal heart sounds and intact distal pulses.  Exam reveals no gallop and no friction rub.    No murmur heard.  Pulses:       Dorsalis pedis pulses are 3+ on the right side, and 3+ on the left side.        Posterior tibial pulses are 3+ on the right side, and 3+ on the left side.   Pulmonary/Chest: Effort normal. No stridor. No respiratory distress. She has no wheezes. She has no rales. She exhibits no tenderness.   Abdominal: Soft. Bowel sounds are normal. She  exhibits no distension and no mass. There is no tenderness. There is no rebound and no guarding.   Musculoskeletal: Normal range of motion. She exhibits no edema.   Feet:   Right Foot:   Protective Sensation: 6 sites tested. 6 sites sensed.   Skin Integrity: Negative for ulcer, blister or skin breakdown.   Left Foot:   Protective Sensation: 6 sites tested. 6 sites sensed.   Skin Integrity: Negative for ulcer, blister or skin breakdown.   Lymphadenopathy:     She has no cervical adenopathy.   Neurological: She is alert and oriented to person, place, and time. She displays normal reflexes. No cranial nerve deficit. She exhibits normal muscle tone. Coordination normal.   Skin: Skin is dry. No rash noted. She is not diaphoretic. No erythema. No pallor.   Psychiatric: She has a normal mood and affect. Her behavior is normal. Judgment and thought content normal.   Vitals reviewed.      Assessment:       1. Osteoporosis due to aromatase inhibitor    2. Lumbar degenerative disc disease    3. Spondylolisthesis of cervical region    4. Chronic vaginitis    5. Controlled type 2 diabetes mellitus with diabetic nephropathy, without long-term current use of insulin        Plan:       Osteoporosis due to aromatase inhibitor  -     DXA Bone Density Spine And Hip; Future; Expected date: 06/19/2017    Lumbar degenerative disc disease  -     hydrocodone-acetaminophen 10-325mg (NORCO)  mg Tab; Take 1 tablet by mouth every 6 (six) hours as needed for Pain.  Dispense: 120 tablet; Refill: 0  -     hydrocodone-acetaminophen 10-325mg (NORCO)  mg Tab; Take 1 tablet by mouth every 6 (six) hours as needed for Pain.  Dispense: 120 tablet; Refill: 0    Spondylolisthesis of cervical region  -     hydrocodone-acetaminophen 10-325mg (NORCO)  mg Tab; Take 1 tablet by mouth every 6 (six) hours as needed for Pain.  Dispense: 120 tablet; Refill: 0  -     hydrocodone-acetaminophen 10-325mg (NORCO)  mg Tab; Take 1 tablet by mouth  every 6 (six) hours as needed for Pain.  Dispense: 120 tablet; Refill: 0    Chronic vaginitis    Controlled type 2 diabetes mellitus with diabetic nephropathy, without long-term current use of insulin  -     Basic metabolic panel; Future; Expected date: 06/19/2017  -     Hemoglobin A1c; Future; Expected date: 06/19/2017  -     Ambulatory referral to Ophthalmology    Other orders  -     nystatin (MYCOSTATIN) powder; Apply topically 4 (four) times daily.  Dispense: 120 g; Refill: 3  -     lisinopril (PRINIVIL,ZESTRIL) 20 MG tablet; Take 0.5 tablets (10 mg total) by mouth once daily.  -     umeclidinium-vilanterol (ANORO ELLIPTA) 62.5-25 mcg/actuation DsDv; Inhale 1 puff into the lungs once daily at 6am. Controller  Dispense: 60 each; Refill: 3      Patient readiness: acceptance and barriers:readiness    During the course of the visit the patient was educated and counseled about the following:     Diabetes:  Discussed general issues about diabetes pathophysiology and management.  Hypertension:   Dietary sodium restriction.  Regular aerobic exercise.  Obesity:   General weight loss/lifestyle modification strategies discussed (elicit support from others; identify saboteurs; non-food rewards, etc).    Goals: Diabetes: Maintain Hemoglobin A1C below 7, Hypertension: Reduce Blood Pressure and Obesity: Reduce calorie intake and BMI    Did patient meet goals/outcomes: No    The following self management tools provided: blood pressure log  excercise log    Patient Instructions (the written plan) was given to the patient/family.     Time spent with patient: 30 minutes

## 2017-06-19 NOTE — PATIENT INSTRUCTIONS
Established High Blood Pressure    High blood pressure (hypertension) is a chronic disease. Often health care providers dont know what causes it. But it can be caused by certain health conditions and medicines.  If you have high blood pressure, you may not have any symptoms. If you do have symptoms, they may include headache, dizziness, changes in your vision, chest pain, and shortness of breath. But even without symptoms, high blood pressure thats not treated raises your risk for heart attack and stroke. High blood pressure is a serious health risk and shouldnt be ignored.  A blood pressure reading is made up of two numbers: a higher number over a lower number. The top number is the systolic pressure. The bottom number is the diastolic pressure. A normal blood pressure is less than 120 over less than 80.  High blood pressure is when either the top number is 140 or higher, or the bottom number is 90 or higher. This must be the result when taking your blood pressure a number of times. The blood pressures between normal and high are called prehypertension.  Home care  If you have high blood pressure, you should do what is listed below to lower your blood pressure. If you are taking medicines for high blood pressure, these methods may reduce or end your need for medicines in the future.  · Begin a weight-loss program if you are overweight.  · Cut back on how much salt you get in your diet. Heres how to do this:  ¨ Dont eat foods that have a lot of salt. These include olives, pickles, smoked meats, and salted potato chips.  ¨ Dont add salt to your food at the table.  ¨ Use only small amounts of salt when cooking.  · Begin an exercise program. Talk with your health care provider about the type of exercise program that would be best for you. It doesn't have to be hard. Even brisk walking for 20 minutes 3 times a week is a good form of exercise.  · Dont take medicines that have heart stimulants. This includes many  cold and sinus decongestant pills and sprays, as well as diet pills. Check the warnings about hypertension on the label. Stimulants such as amphetamine or cocaine could be lethal for someone with high blood pressure. Never take these.  · Limit how much caffeine you get in your diet. Switch to caffeine-free products.  · Stop smoking. If you are a long-time smoker, this can be hard. Enroll in a stop-smoking program to make it more likely that you will quit for good.  · Learn how to handle stress. This is an important part of any program to lower blood pressure. Learn about relaxation methods like meditation, yoga, or biofeedback.  · If your provider prescribed medicines, take them exactly as directed. Missing doses may cause your blood pressure get out of control.  · Consider buying an automatic blood pressure machine. You can get one of these at most pharmacies. Use this to watch your blood pressure at home. Give the results to your provider.  Follow-up care  You will need to make regular visits to your health care provider. This is to check your blood pressure and to make changes to your medicines. Make a follow-up appointment as directed.  When to seek medical advice  Call your health care provider right away if any of these occur:  · Chest pain or shortness of breath  · Severe headache  · Throbbing or rushing sound in the ears  · Nosebleed  · Sudden severe pain in your belly (abdomen)  · Extreme drowsiness, confusion, or fainting  · Dizziness or dizziness with a spinning sensation (vertigo)  · Weakness of an arm or leg or one side of the face  · You have problems speaking or seeing   Date Last Reviewed: 11/25/2014  © 8066-0042 [employer]. [address] [place] [address], [place] [address]. All rights reserved. This information is not intended as a substitute for professional medical care. Always follow your healthcare professional's instructions.        Diabetes (General Information)  Diabetes is a long-term  health problem. It means your body does not make enough insulin. Or it may mean that your body cannot use the insulin it makes. Insulin is a hormone in your body. It lets blood sugar (glucose) reach the cells in your body. All of your cells need glucose for fuel.  When you have diabetes, the glucose in your blood builds up because it cannot get into the cells. This buildup is called high blood sugar (hyperglycemia).  Your blood sugar level depends on several things. It depends on what kind of food you eat and how much of it you eat. It also depends on how much exercise you get, and how much insulin you have in your body. Eating too much of the wrong kinds of food or not taking diabetes medicine on time can cause high blood sugar. Infections can cause high blood sugar even if you are taking medicines correctly.  These things can also cause low blood sugar:  · Missing meals  · Not eating enough food  · Taking too much diabetes medicine  Diabetes can cause serious problems over time if you do not get treated. These problems include heart disease, stroke, kidney failure, and blindness. They also include nerve pain or loss of feeling in your legs and feet, and gangrene of the feet. By keeping your blood sugar under control you can prevent or delay these problems.  Normal blood sugar levels are 80 to 100 before a meal and less than 180 in the 1 to 2 hours after a meal.  Home care  Follow these guidelines when caring for yourself at home:  · Follow the diet your healthcare provider gives you. Take insulin or other diabetes medicine exactly as told to.  · Watch your blood sugar as you are told to. Keep a log of your results. This will help your provider change your medicines to keep your blood sugar under control.  · Try to reach your ideal weight. You may be able to cut back on or not have to take diabetes medicine if you eat the right foods and get exercise.  · Do not smoke. Smoking worsens the effects of diabetes on your  circulation. You are much more likely to have a heart attack if you have diabetes and you smoke.  · Take good care of your feet. If you have lost feeling in your feet, you may not see an injury or infection. Check your feet and between your toes at least once a week.  · Wear a medical alert bracelet or necklace, or carry a card in your wallet that says you have diabetes. This will help healthcare providers give you the right care if you get very ill and cannot tell them that you have diabetes.  Sick day plan  If you get a cold, the flu, or a bacterial or viral infection, take these steps:  · Look at your diabetes sick plan and call your healthcare provider as you were told to. You may need to call your provider right away if:  ¨ Your blood sugar is above 240 while taking your diabetes medicine  ¨ Your urine ketone levels are above normal or high  ¨ You have been vomiting more than 6 hours  ¨ You have trouble breathing or your breath ha s a fruity smell  ¨ You have a high fever  ¨ You have a fever for several days and you are not getting better  ¨ You get light-headed and are sleepier than usual  · Keep taking your diabetes pills (oral medicine) even if you have been vomiting and are feeling sick. Call your provider right away because you may need insulin to lower your blood sugar until you recover from your illness.  · Keep taking your insulin even if you have been vomiting and are feeling sick. Call your provider right away to ask if you need to change your insulin dose. This will depend on your blood sugar results.  · Check your blood sugar every 2 to 4 hours, or at least 4 times a day.  · Check your ketones often. If you are vomiting and having diarrhea, watch them more often.  · Do not skip meals. Try to eat small meals on a regular schedule. Do this even if you do not feel like eating.  · Drink water or other liquids that do not have caffeine or calories. This will keep you from getting dehydrated. If you are  nauseated or vomiting, takes small sips every 5 minutes. To prevent dehydration try to drink a cup (8 ounces) of fluids every hour while you are awake.  General care  Always bring a source of fast-acting sugar with you in case you have symptoms of low blood sugar (below 70). At the first sign of low blood sugar, eat or drink 15 to 20 grams of fast-acting sugar to raise your blood sugar. Examples are:  · 3 to 4 glucose tablets. You can buy these at most [employer].  · 4 ounces (1/2 cup) of regular (not diet) soft drinks  · 4 ounces (1/2 cup) of any fruit juice  · 8 ounces (1 cup) of milk  · 5 to 6 pieces of hard candy  · 1 tablespoon of honey  Check your blood sugar 15 minutes after treating yourself. If it is still below 70, take 15 to 20 more grams of fast-acting sugar. Test again in 15 minutes. If it returns to normal (70 or above), eat a snack or meal to keep your blood sugar in a safe range. If it stays low, call your doctor or go to an emergency room.  Follow-up care  Follow-up with your healthcare provider, or as advised. For more information about diabetes, visit the American Diabetes Association website at www.diabetes.org or call 705-856-1841.  When to seek medical advice  Call your healthcare provider right away if you have any of these symptoms of high blood sugar:  · Frequent urination  · Dizziness  · Drowsiness  · Thirst  · Headache  · Nausea or vomiting  · Abdominal pain  · Eyesight changes  · Fast breathing  · Confusion or loss of consciousness  Also call your provider right away if you have any of these signs of low blood sugar:  · Fatigue  · Headache  · Shakes  · Excess sweating  · Hunger  · Feeling anxious or restless  · Eyesight changes  · Drowsiness  · Weakness  · Confusion or loss of consciousness  Call 911  Call for emergency help right away if any of these occur:  · Chest pain or shortness of breath  · Dizziness or fainting  · Weakness of an arm or leg or one side of the face  · Trouble  speaking or seeing   Date Last Reviewed: 6/1/2016  © 8483-9860 [employer]. [address] [place] [address]. All rights reserved. This information is not intended as a substitute for professional medical care. Always follow your healthcare professional's instructions.        A Sample Walking Program  Experts recommend walking briskly on most days. Aim for a target of 30 minutes on most days, or 150 or more minutes a week. Walking programs can help you reach this goal by slowly increasing the frequency and the amount of  time you walk. Try this walking program:    First week  · Walk 3 times a week.  · Walk for 5 minutes each time.  Second week  · Walk 3 times a week.  · Walk for 10 minutes each time.  Third week  · Walk 3 times a week.  · Walk for 13 minutes each time.  Fourth week  · Walk 3 times a week.  · Walk for 15 minutes each time.  Fifth week  · Walk 4 times a week.  · Walk for 15 minutes each time.  Sixth week and beyond  Gradually increase your minutes of walking each time, and your number of times each week, until you reach 30 minutes, 5-7 days of the week.  Tips for getting the most from your walking program  · Walk briskly. If you can sing, speed up. If you cant talk easily, slow down.  · Choose good walking shoes with padded soles and good arch support.  · Dont use hand or ankle weights. They can cause injuries.  · Walk indoors if the weather is bad. Use a treadmill or walk inside a shopping mall  Before you start walking, check with your healthcare provider if you are new to exercise, over 40, overweight, or a smoker. Also check with your provider  if you have heart disease, high blood pressure, diabetes, arthritis, asthma, or any other health problem that concerns you. Your provider can help you get started and help you stay safe.   Date Last Reviewed: 8/13/2015  © 0878-8560 [employer]. [address] [place] [address]. All rights reserved. This  information is not intended as a substitute for professional medical care. Always follow your healthcare professional's instructions.        Weight Management: Getting Started  Healthy bodies come in all shapes and sizes. Not all bodies are made to be thin. For some people, a healthy weight is higher than the average weight listed on weight charts. Your healthcare provider can help you decide on a healthy weight for you.    Reasons to lose weight  Losing weight can help with some health problems, such as high blood pressure, heart disease, diabetes, sleep apnea, and arthritis. You may also feel more energy.  Set your long-term goal  Your goal doesn't even have to be a specific weight. You may decide on a fitness goal (such as being able to walk 10 miles a week), or a health goal (such as lowering your blood pressure). Choose a goal that is measurable and reasonable, so you know when you've reached it. A goal of reaching a BMI of less than 25 is not always reasonable (or possible).   Make an action plan  Habits dont change overnight. Setting your goals too high can leave you feeling discouraged if you cant reach them. Be realistic. Choose one or two small changes you can make now. Set an action plan for how you are going to make these changes. When you can stick to this plan, keep making a few more small changes. Taking small steps will help you stay on the path to success.  Track your progress  Write down your goals. Then, keep a daily record of your progress. Write down what you eat and how active you are. This record lets you look back on how much youve done. It may also help when youre feeling frustrated. Reward yourself for success. Even if you dont reach every goal, give yourself credit for what you do get done.  Get support  Encouragement from others can help make losing weight easier. Ask your family members and friends for support. They may even want to join you. Also look to your healthcare provider,  registered dietitian, and  for help. Your local hospital can give you more information about nutrition, exercise, and weight loss.  Date Last Reviewed: 1/31/2016  © 1176-3367 [employer]. [address] [place] [address], [place] [address]. All rights reserved. This information is not intended as a substitute for professional medical care. Always follow your healthcare professional's instructions.

## 2017-06-23 DIAGNOSIS — I48.20 CHRONIC ATRIAL FIBRILLATION: Primary | ICD-10-CM

## 2017-06-26 ENCOUNTER — TELEPHONE (OUTPATIENT)
Dept: HEMATOLOGY/ONCOLOGY | Facility: CLINIC | Age: 67
End: 2017-06-26

## 2017-06-26 ENCOUNTER — ANTI-COAG VISIT (OUTPATIENT)
Dept: CARDIOLOGY | Facility: CLINIC | Age: 67
End: 2017-06-26

## 2017-06-26 DIAGNOSIS — Z79.01 LONG TERM CURRENT USE OF ANTICOAGULANT THERAPY: ICD-10-CM

## 2017-06-26 LAB — INR PPP: 1.8

## 2017-06-26 NOTE — TELEPHONE ENCOUNTER
----- Message from Amelia Garrison sent at 6/26/2017 11:00 AM CDT -----  Contact: self [phone]  Call placed to pod. Patient returned your call, please call back.  Thank you!

## 2017-06-26 NOTE — TELEPHONE ENCOUNTER
----- Message from Jannie Hansen sent at 6/26/2017  8:47 AM CDT -----  Contact: self  Patient called regarding her appt on 6/29 and want to reschedule for later time after 1:30pm. Has an appt with wound physician on that day. Or can reschedule next day. Please contact [phone] (home) and [phone]

## 2017-06-26 NOTE — TELEPHONE ENCOUNTER
----- Message from Sobia Chikissonal sent at 6/26/2017  9:53 AM CDT -----  Contact: Patient  Patient states that she is calling back to re-schedule her appointment because she had another one on that day.  Can you please call the patient back at [phone].  Thank you

## 2017-06-30 ENCOUNTER — OFFICE VISIT (OUTPATIENT)
Dept: HEMATOLOGY/ONCOLOGY | Facility: CLINIC | Age: 67
End: 2017-06-30
Payer: MEDICARE

## 2017-06-30 VITALS
WEIGHT: 291 LBS | DIASTOLIC BLOOD PRESSURE: 74 MMHG | SYSTOLIC BLOOD PRESSURE: 132 MMHG | HEIGHT: 68 IN | BODY MASS INDEX: 44.1 KG/M2 | HEART RATE: 85 BPM | RESPIRATION RATE: 18 BRPM | TEMPERATURE: 99 F

## 2017-06-30 DIAGNOSIS — I70.0 ABDOMINAL AORTIC ATHEROSCLEROSIS: ICD-10-CM

## 2017-06-30 DIAGNOSIS — J44.9 CHRONIC OBSTRUCTIVE PULMONARY DISEASE, UNSPECIFIED COPD TYPE: ICD-10-CM

## 2017-06-30 DIAGNOSIS — D50.8 IRON DEFICIENCY ANEMIA SECONDARY TO INADEQUATE DIETARY IRON INTAKE: Primary | ICD-10-CM

## 2017-06-30 DIAGNOSIS — M19.90 ARTHRITIS: ICD-10-CM

## 2017-06-30 DIAGNOSIS — F33.9 MAJOR DEPRESSION, RECURRENT, CHRONIC: ICD-10-CM

## 2017-06-30 DIAGNOSIS — E11.69 DIABETES MELLITUS TYPE 2 IN OBESE: ICD-10-CM

## 2017-06-30 DIAGNOSIS — E66.9 DIABETES MELLITUS TYPE 2 IN OBESE: ICD-10-CM

## 2017-06-30 DIAGNOSIS — F41.9 ANXIETY: ICD-10-CM

## 2017-06-30 PROCEDURE — 99214 OFFICE O/P EST MOD 30 MIN: CPT | Mod: S$GLB,,, | Performed by: INTERNAL MEDICINE

## 2017-06-30 PROCEDURE — 4010F ACE/ARB THERAPY RXD/TAKEN: CPT | Mod: S$GLB,,, | Performed by: INTERNAL MEDICINE

## 2017-06-30 PROCEDURE — 3044F HG A1C LEVEL LT 7.0%: CPT | Mod: S$GLB,,, | Performed by: INTERNAL MEDICINE

## 2017-06-30 PROCEDURE — 99999 PR PBB SHADOW E&M-EST. PATIENT-LVL III: CPT | Mod: PBBFAC,,, | Performed by: INTERNAL MEDICINE

## 2017-06-30 PROCEDURE — 99499 UNLISTED E&M SERVICE: CPT | Mod: S$GLB,,, | Performed by: INTERNAL MEDICINE

## 2017-06-30 NOTE — PROGRESS NOTES
"FOLLOWUP    CHIEF COMPLAINT:     Ms. Tian is a 66-year-old female who has a history of progressive anemia after    IV iron.       She is tolerating Glucophage for diabetes as well as Lexapro for depression and Zestril   for hypertension.  No change in past medical history or medications.  Since last visit pt has had her blood pressure in half  SHe is feeling better with more energy  Her mobility continues to remain impaired and is not improving    Past Surgical History:   Procedure Laterality Date    CATARACT EXTRACTION Left     OS      SECTION      x2    EYE SURGERY      HYSTERECTOMY      OOPHORECTOMY      one ovary conserved    RETINAL DETACHMENT SURGERY      buckle --OS    TONSILLECTOMY         REVIEW OF SYSTEMS:  GENERAL:  No progression of fatigue nor SOB  She is obese.  Difficulty   walking, extreme knee pain.  Gait instability only due to knees   Depression.  No fever or   chills.  No unexpected change in weight.  HEENT:  No photophobia, rhinorrhea, sinus congestion, tinnitus, gingival   bleeding, oral ulcers, or sore throat.  RESPIRATORY:  No cough or wheezing.  Intermittent shortness of breath especially on exertion  CARDIOVASCULAR:  No chest pain, palpitations, or swelling of the lower   extremities.  GASTROINTESTINAL:  No abdominal pain, dysphagia, emesis, diarrhea, or   constipation.  No heartburn, abdominal distention, melena, or hematochezia.  GENITOURINARY:  No urinary frequency, hesitancy, dysuria, or hematuria.  RHEUM:  No arthralgias or joint swelling.  MUSCOLSKELETAL:  No neck pain, back pain, positive weakness.  NEUROLOGICAL:  No headaches, positive dizziness, paresthesias, no change in memory.  PSYCH:  No agitation, change in behavior, or anxiety.  ENDOCRINE:  No hot or cold intolerance.    PHYSICAL EXAMINATION:  /74   Pulse 85   Temp 98.5 °F (36.9 °C)   Resp 18   Ht 5' 8" (1.727 m)   Wt 132 kg (291 lb 0.1 oz)   BMI 44.25 kg/m²     GENERAL:  She is obese.  She is " oriented, well developed, well nourished.  PSYCH:  Pleasant affect.  No anxiety or depression.  HEENT:  Normocephalic.  Lids intact, conjunctivae pink.  Sinuses nontender to   palpation.  OP clear, no palatal pallor.  NECK:  Supple.  Trachea midline.  No palpable abnormalities.  CHEST:  No use of accessory muscles during respiration.  ABDOMEN:  NT, ND.  Normal bowel sounds.  No palpable HSM or mass.  EXTREMITIES:  Legs, 1+ pitting edema.  Evidence of chronic venous insufficiency stable  NEUROLOGICAL:  Alert and oriented x 3.  Cranial nerves grossly intact.  SKIN:  Warm, dry.  Ecchymosis evident.  No tenting, petechiae    LABS:      Lab Results   Component Value Date    WBC 7.95 06/05/2017    HGB 10.8 (L) 06/05/2017    HCT 38.5 06/05/2017    MCV 90 06/05/2017     06/05/2017       IMPRESSION AND PLAN:        Iron deficiency anemia secondary to inadequate dietary iron intake  -     CBC auto differential; Future; Expected date: 06/30/2017  -     Ferritin; Future; Expected date: 06/30/2017  -     Iron and TIBC; Future; Expected date: 06/30/2017  -     CMP; Future; Expected date: 06/30/2017    Diabetes mellitus type 2 in obese    Abdominal aortic atherosclerosis    Chronic obstructive pulmonary disease, unspecified COPD type    Major depression, recurrent, chronic    Anxiety    Arthritis          rtc 3 months with iron and cbc  Iron levels trending downward   May need IV iron in the near future  Lexapro can cause some peripheral distraction of blood cells but given her history of depression I don't want to change this medication at this time.  She will add boost plus to get iron in her  Advised patient that she should work on exercising which will help with her obesity and will also help with her arthritis.  She understands the importance of weight loss but is having a difficult time due to severe arthritis  Overall anemia slowly improving however MCHC is worsening indicating likely further iron deficiency    If  her body thousand and iron in her bone marrow cannot function and make new red blood cells hence her anemia will eventually worsen  Patient is aware of the symptoms of anemia and if she develops any will phone me immediately to recheck her counts  Recheck iron studies and CBC in approximately 3 months make sure patient does not need further IV iron therapy.  Currently her levels are stable and she does not require any intervention for such      Current Outpatient Prescriptions:     ascorbic acid, vitamin C, (VITAMIN C) 500 MG tablet, Take 1 tablet (500 mg total) by mouth every evening., Disp: , Rfl:     atorvastatin (LIPITOR) 20 MG tablet, Take 1 tablet (20 mg total) by mouth once daily., Disp: 90 tablet, Rfl: 3    buPROPion (WELLBUTRIN) 75 MG tablet, Take 1 tablet (75 mg total) by mouth 2 (two) times daily., Disp: 60 tablet, Rfl: 1    clonazePAM (KLONOPIN) 1 MG tablet, Take 1 tablet (1 mg total) by mouth 2 (two) times daily as needed., Disp: 60 tablet, Rfl: 4    clonazePAM (KLONOPIN) 1 MG tablet, TAKE ONE TABLET BY MOUTH TWICE DAILY AS NEEDED FOR ANXIETY, Disp: 60 tablet, Rfl: 0    duloxetine (CYMBALTA) 30 MG capsule, Take 1 capsule (30 mg total) by mouth once daily., Disp: 90 capsule, Rfl: 4    escitalopram oxalate (LEXAPRO) 20 MG tablet, Take 1 tablet (20 mg total) by mouth once daily., Disp: 90 tablet, Rfl: 3    furosemide (LASIX) 40 MG tablet, Take 1 tablet (40 mg total) by mouth 2 (two) times daily., Disp: 60 tablet, Rfl: 0    gabapentin (NEURONTIN) 600 MG tablet, Take 1 tablet (600 mg total) by mouth 3 (three) times daily., Disp: 270 tablet, Rfl: 3    hydrocodone-acetaminophen 10-325mg (NORCO)  mg Tab, Take 1 tablet by mouth every 6 (six) hours as needed for Pain., Disp: 120 tablet, Rfl: 0    hydrocodone-acetaminophen 10-325mg (NORCO)  mg Tab, Take 1 tablet by mouth every 6 (six) hours as needed for Pain., Disp: 120 tablet, Rfl: 0    [START ON 7/19/2017] hydrocodone-acetaminophen  10-325mg (NORCO)  mg Tab, Take 1 tablet by mouth every 6 (six) hours as needed for Pain., Disp: 120 tablet, Rfl: 0    lisinopril (PRINIVIL,ZESTRIL) 20 MG tablet, Take 0.5 tablets (10 mg total) by mouth once daily., Disp: , Rfl:     magnesium oxide (MAG-OX) 400 mg tablet, Take 1 tablet (400 mg total) by mouth 2 (two) times daily., Disp: , Rfl: 0    metformin (GLUCOPHAGE) 500 MG tablet, TAKE 1 TABLET TWICE DAILY WITH MEALS, Disp: 180 tablet, Rfl: 3    methocarbamol (ROBAXIN) 750 MG Tab, TAKE 1 TABLET FOUR TIMES DAILY, Disp: 360 tablet, Rfl: 1    multivitamin (THERAGRAN) tablet, Take 1 tablet by mouth once daily., Disp: , Rfl:     nicotine (NICODERM CQ) 21 mg/24 hr, Place 1 patch onto the skin once daily., Disp: 14 patch, Rfl: 0    nystatin (MYCOSTATIN) powder, Apply topically 4 (four) times daily., Disp: 120 g, Rfl: 3    polyethylene glycol (GLYCOLAX) 17 gram/dose powder, MIX 17 GRAMS IN LIQUID AND DRINK EVERY DAY AS NEEDED, Disp: 1530 g, Rfl: 11    potassium chloride SA (K-DUR,KLOR-CON) 20 MEQ tablet, Take 1 tablet (20 mEq total) by mouth once daily., Disp: 30 tablet, Rfl: 6    SPIRIVA WITH HANDIHALER 18 mcg inhalation capsule, INHALE THE CONTENTS OF 1 CAPSULE EVERY DAY, Disp: 90 capsule, Rfl: 1    temazepam (RESTORIL) 30 mg capsule, Take 1 capsule (30 mg total) by mouth nightly as needed., Disp: 30 capsule, Rfl: 3    trazodone (DESYREL) 150 MG tablet, TAKE 1 TABLET EVERY EVENING., Disp: 90 tablet, Rfl: 3    umeclidinium-vilanterol (ANORO ELLIPTA) 62.5-25 mcg/actuation DsDv, Inhale 1 puff into the lungs once daily at 6am. Controller, Disp: 60 each, Rfl: 3    warfarin (COUMADIN) 5 MG tablet, Take 0.5 tablets (2.5 mg total) by mouth Daily., Disp: 90 tablet, Rfl: 1    levalbuterol (XOPENEX) 0.63 mg/3 mL nebulizer solution, INHALE THE CONTENTS OF 1 VIAL BY NEBULIZATION 3 (THREE) TIMES DAILY., Disp: 792 mL, Rfl: 3    metoprolol succinate (TOPROL-XL) 25 MG 24 hr tablet, Take 0.5 tablets (12.5 mg  total) by mouth once daily. On hold waiting for dr visit, Disp: , Rfl:       She is to continue Coumadin for chronic atrial fibrillation as well as her diabetic medication to help control hyperglycemia due to diabetes

## 2017-07-04 RX ORDER — LEVALBUTEROL INHALATION SOLUTION 0.63 MG/3ML
SOLUTION RESPIRATORY (INHALATION)
Qty: 792 ML | Refills: 3 | Status: SHIPPED | OUTPATIENT
Start: 2017-07-04 | End: 2017-12-28 | Stop reason: SDUPTHER

## 2017-07-05 PROBLEM — E66.9 DIABETES MELLITUS TYPE 2 IN OBESE: Status: ACTIVE | Noted: 2017-07-05

## 2017-07-05 PROBLEM — E11.69 DIABETES MELLITUS TYPE 2 IN OBESE: Status: ACTIVE | Noted: 2017-07-05

## 2017-07-07 RX ORDER — ALBUTEROL SULFATE 90 UG/1
AEROSOL, METERED RESPIRATORY (INHALATION)
Qty: 18 G | Refills: 0 | Status: SHIPPED | OUTPATIENT
Start: 2017-07-07 | End: 2017-08-01 | Stop reason: SDUPTHER

## 2017-07-10 ENCOUNTER — ANTI-COAG VISIT (OUTPATIENT)
Dept: CARDIOLOGY | Facility: CLINIC | Age: 67
End: 2017-07-10

## 2017-07-10 LAB — INR PPP: 1.7

## 2017-07-12 RX ORDER — MELOXICAM 7.5 MG/1
TABLET ORAL
Qty: 90 TABLET | Refills: 1 | Status: SHIPPED | OUTPATIENT
Start: 2017-07-12 | End: 2017-07-18 | Stop reason: ALTCHOICE

## 2017-07-14 ENCOUNTER — HOSPITAL ENCOUNTER (OUTPATIENT)
Dept: RADIOLOGY | Facility: CLINIC | Age: 67
Discharge: HOME | End: 2017-07-14
Attending: FAMILY MEDICINE
Payer: MEDICARE

## 2017-07-14 DIAGNOSIS — T38.6X5A OSTEOPOROSIS DUE TO AROMATASE INHIBITOR: ICD-10-CM

## 2017-07-14 DIAGNOSIS — M81.8 OSTEOPOROSIS DUE TO AROMATASE INHIBITOR: ICD-10-CM

## 2017-07-14 PROCEDURE — 77080 DXA BONE DENSITY AXIAL: CPT | Mod: TC,PO

## 2017-07-14 PROCEDURE — 77080 DXA BONE DENSITY AXIAL: CPT | Mod: 26,,, | Performed by: RADIOLOGY

## 2017-07-17 ENCOUNTER — DOCUMENTATION ONLY (OUTPATIENT)
Dept: FAMILY MEDICINE | Facility: CLINIC | Age: 67
End: 2017-07-17

## 2017-07-17 NOTE — PROGRESS NOTES
Pre-Visit Chart Review  For Appointment Scheduled on 07/18/2017    Health Maintenance Due   Topic Date Due    Zoster Vaccine  11/15/2011    Eye Exam  06/02/2017    Mammogram  07/15/2017

## 2017-07-18 ENCOUNTER — OFFICE VISIT (OUTPATIENT)
Dept: FAMILY MEDICINE | Facility: CLINIC | Age: 67
End: 2017-07-18
Payer: MEDICARE

## 2017-07-18 VITALS
BODY MASS INDEX: 44.1 KG/M2 | WEIGHT: 291 LBS | HEART RATE: 78 BPM | HEIGHT: 68 IN | OXYGEN SATURATION: 95 % | DIASTOLIC BLOOD PRESSURE: 67 MMHG | SYSTOLIC BLOOD PRESSURE: 136 MMHG | TEMPERATURE: 98 F

## 2017-07-18 DIAGNOSIS — Z12.31 OTHER SCREENING MAMMOGRAM: Primary | ICD-10-CM

## 2017-07-18 DIAGNOSIS — Z72.0 TOBACCO ABUSE: ICD-10-CM

## 2017-07-18 DIAGNOSIS — M51.36 LUMBAR DEGENERATIVE DISC DISEASE: ICD-10-CM

## 2017-07-18 DIAGNOSIS — E11.610 DIABETIC NEUROGENIC ARTHROPATHY: ICD-10-CM

## 2017-07-18 DIAGNOSIS — M43.12 SPONDYLOLISTHESIS OF CERVICAL REGION: ICD-10-CM

## 2017-07-18 PROCEDURE — 99214 OFFICE O/P EST MOD 30 MIN: CPT | Mod: S$GLB,,, | Performed by: FAMILY MEDICINE

## 2017-07-18 PROCEDURE — 4010F ACE/ARB THERAPY RXD/TAKEN: CPT | Mod: S$GLB,,, | Performed by: FAMILY MEDICINE

## 2017-07-18 PROCEDURE — 99999 PR PBB SHADOW E&M-EST. PATIENT-LVL IV: CPT | Mod: PBBFAC,,, | Performed by: FAMILY MEDICINE

## 2017-07-18 PROCEDURE — 3044F HG A1C LEVEL LT 7.0%: CPT | Mod: S$GLB,,, | Performed by: FAMILY MEDICINE

## 2017-07-18 RX ORDER — GABAPENTIN 800 MG/1
800 TABLET ORAL 3 TIMES DAILY
Qty: 270 TABLET | Refills: 3 | Status: SHIPPED | OUTPATIENT
Start: 2017-07-18 | End: 2018-04-13 | Stop reason: SDUPTHER

## 2017-07-18 RX ORDER — TRAZODONE HYDROCHLORIDE 150 MG/1
75 TABLET ORAL NIGHTLY
Qty: 90 TABLET | Refills: 3
Start: 2017-07-18 | End: 2018-03-30 | Stop reason: SDUPTHER

## 2017-07-18 RX ORDER — HYDROCODONE BITARTRATE AND ACETAMINOPHEN 10; 325 MG/1; MG/1
1 TABLET ORAL EVERY 6 HOURS PRN
Qty: 120 TABLET | Refills: 0 | Status: SHIPPED | OUTPATIENT
Start: 2017-10-19 | End: 2017-10-25 | Stop reason: SDUPTHER

## 2017-07-18 RX ORDER — DULOXETIN HYDROCHLORIDE 60 MG/1
60 CAPSULE, DELAYED RELEASE ORAL DAILY
Qty: 90 CAPSULE | Refills: 4 | Status: ON HOLD | OUTPATIENT
Start: 2017-07-18 | End: 2017-09-18 | Stop reason: HOSPADM

## 2017-07-18 RX ORDER — TEMAZEPAM 15 MG/1
15 CAPSULE ORAL NIGHTLY PRN
Qty: 30 CAPSULE | Refills: 2 | Status: SHIPPED | OUTPATIENT
Start: 2017-07-18 | End: 2017-10-25

## 2017-07-18 RX ORDER — HYDROCODONE BITARTRATE AND ACETAMINOPHEN 10; 325 MG/1; MG/1
1 TABLET ORAL EVERY 6 HOURS PRN
Qty: 120 TABLET | Refills: 0 | Status: SHIPPED | OUTPATIENT
Start: 2017-09-18 | End: 2017-10-25 | Stop reason: SDUPTHER

## 2017-07-18 RX ORDER — HYDROCODONE BITARTRATE AND ACETAMINOPHEN 10; 325 MG/1; MG/1
1 TABLET ORAL EVERY 6 HOURS PRN
Qty: 120 TABLET | Refills: 0 | Status: SHIPPED | OUTPATIENT
Start: 2017-08-18 | End: 2017-10-25 | Stop reason: SDUPTHER

## 2017-07-18 NOTE — PROGRESS NOTES
Subjective:       Patient ID: Nelly Tian is a 66 y.o. female.    Chief Complaint: Hypertension and Diabetes    Patient Active Problem List:     Diabetes mellitus with neuropathy     Long term current use of anticoagulant therapy     Arthritis     Major depression, recurrent, chronic     DM II (diabetes mellitus, type II), controlled     BMI 36.0-36.9,adult     GERD (gastroesophageal reflux disease)     Tobacco dependency     Asthma     Chronic atrial fibrillation     Hypertension associated with diabetes     Morbid obesity with BMI of 45.0-49.9, adult     PVD (peripheral vascular disease)     Abdominal aortic atherosclerosis     Dependency on pain medication     History of MI (myocardial infarction)     Iron deficiency anemia     Hepatomegaly     DDD (degenerative disc disease), lumbar     Anxiety     Type 2 diabetes mellitus with complication, without long-term current use of insulin     Hyperlipidemia     NSAID long-term use     Osteoarthritis of right hip     Mixed restrictive and obstructive lung disease     Hypercapnic respiratory failure, chronic     Chronic respiratory failure with hypoxia     Obesity, Class III, BMI 40-49.9 (morbid obesity)     Yeast dermatitis     Nonhealing skin ulcer, limited to breakdown of skin     Pressure ulcer, stage 3     Infected wound     COPD (chronic obstructive pulmonary disease)     Microcytic anemia     Cellulitis, abdominal wall     Constipation     Chronic respiratory failure     Diabetes mellitus type 2 in obese          Hypertension   This is a chronic problem. The problem has been resolved since onset. The problem is controlled. Associated symptoms include anxiety, blurred vision, chest pain and shortness of breath. Agents associated with hypertension include NSAIDs. Risk factors for coronary artery disease include obesity, sedentary lifestyle and post-menopausal state.   Diabetes   Associated symptoms include blurred vision and chest pain.     Review of Systems    Eyes: Positive for blurred vision.   Respiratory: Positive for shortness of breath.    Cardiovascular: Positive for chest pain.       Objective:      Physical Exam   Constitutional: She is oriented to person, place, and time. She appears well-developed and well-nourished.   HENT:   Head: Normocephalic and atraumatic.   Right Ear: External ear normal.   Left Ear: External ear normal.   Nose: Nose normal.   Mouth/Throat: No oropharyngeal exudate.   Eyes: Conjunctivae and EOM are normal. Pupils are equal, round, and reactive to light. Right eye exhibits no discharge. Left eye exhibits no discharge. No scleral icterus.   Neck: Normal range of motion. Neck supple. No JVD present. No tracheal deviation present. No thyromegaly present.   Cardiovascular: Normal rate, normal heart sounds and intact distal pulses.  Exam reveals no gallop and no friction rub.    No murmur heard.  Pulses:       Dorsalis pedis pulses are 3+ on the right side, and 3+ on the left side.        Posterior tibial pulses are 3+ on the right side, and 3+ on the left side.   Pulmonary/Chest: Effort normal. No stridor. No respiratory distress. She has no wheezes. She has no rales. She exhibits no tenderness.   Abdominal: Soft. Bowel sounds are normal. She exhibits no distension and no mass. There is no tenderness. There is no rebound and no guarding.   Dressing in place   Musculoskeletal: Normal range of motion. She exhibits no edema.   Feet:   Right Foot:   Protective Sensation: 6 sites tested. 6 sites sensed.   Skin Integrity: Negative for ulcer, blister or skin breakdown.   Left Foot:   Protective Sensation: 6 sites tested. 6 sites sensed.   Skin Integrity: Negative for ulcer, blister or skin breakdown.   Lymphadenopathy:     She has no cervical adenopathy.   Neurological: She is alert and oriented to person, place, and time. She displays normal reflexes. No cranial nerve deficit. She exhibits normal muscle tone. Coordination normal.   Skin: Skin is  dry. No rash noted. She is not diaphoretic. No erythema. No pallor.   Psychiatric: She has a normal mood and affect. Her behavior is normal. Judgment and thought content normal.   Vitals reviewed.      Lab Results   Component Value Date    HGBA1C 5.9 (H) 07/14/2017   \  Assessment:       1. Other screening mammogram    2. Diabetic neurogenic arthropathy    3. Lumbar degenerative disc disease    4. Spondylolisthesis of cervical region    5. Tobacco abuse        Plan:       Other screening mammogram  -     Mammo Digital Screening Bilateral With CAD; Future; Expected date: 07/18/2017    Diabetic neurogenic arthropathy  -     gabapentin (NEURONTIN) 800 MG tablet; Take 1 tablet (800 mg total) by mouth 3 (three) times daily.  Dispense: 270 tablet; Refill: 3    Lumbar degenerative disc disease  -     hydrocodone-acetaminophen 10-325mg (NORCO)  mg Tab; Take 1 tablet by mouth every 6 (six) hours as needed for Pain.  Dispense: 120 tablet; Refill: 0  -     hydrocodone-acetaminophen 10-325mg (NORCO)  mg Tab; Take 1 tablet by mouth every 6 (six) hours as needed for Pain.  Dispense: 120 tablet; Refill: 0  -     hydrocodone-acetaminophen 10-325mg (NORCO)  mg Tab; Take 1 tablet by mouth every 6 (six) hours as needed for Pain.  Dispense: 120 tablet; Refill: 0    Spondylolisthesis of cervical region  -     hydrocodone-acetaminophen 10-325mg (NORCO)  mg Tab; Take 1 tablet by mouth every 6 (six) hours as needed for Pain.  Dispense: 120 tablet; Refill: 0  -     hydrocodone-acetaminophen 10-325mg (NORCO)  mg Tab; Take 1 tablet by mouth every 6 (six) hours as needed for Pain.  Dispense: 120 tablet; Refill: 0  -     hydrocodone-acetaminophen 10-325mg (NORCO)  mg Tab; Take 1 tablet by mouth every 6 (six) hours as needed for Pain.  Dispense: 120 tablet; Refill: 0    Tobacco abuse  -     Ambulatory referral to Smoking Cessation Program    Other orders  -     temazepam (RESTORIL) 15 mg Cap; Take 1 capsule  (15 mg total) by mouth nightly as needed.  Dispense: 30 capsule; Refill: 2  -     trazodone (DESYREL) 150 MG tablet; Take 0.5 tablets (75 mg total) by mouth every evening.  Dispense: 90 tablet; Refill: 3  -     duloxetine (CYMBALTA) 60 MG capsule; Take 1 capsule (60 mg total) by mouth once daily.  Dispense: 90 capsule; Refill: 4  -     zoster vaccine live, PF, (ZOSTAVAX, PF,) 19,400 unit/0.65 mL injection; Inject 19,400 Units into the skin once.  Dispense: 1 vial; Refill: 0      Patient readiness: acceptance and barriers:readiness, social stressors and environmental    During the course of the visit the patient was educated and counseled about the following:     Diabetes:  Discussed general issues about diabetes pathophysiology and management.  Hypertension:   Dietary sodium restriction.  Regular aerobic exercise.  Check blood pressures daily and record.  Obesity:   General weight loss/lifestyle modification strategies discussed (elicit support from others; identify saboteurs; non-food rewards, etc).  Regular aerobic exercise program discussed.    Goals: Diabetes: Maintain Hemoglobin A1C below 7, Hypertension: Reduce Blood Pressure and Obesity: Reduce calorie intake and BMI    Did patient meet goals/outcomes: No    The following self management tools provided: blood pressure log  blood glucose log  excercise log    Patient Instructions (the written plan) was given to the patient/family.     Time spent with patient: 30 minutes

## 2017-07-19 ENCOUNTER — LAB VISIT (OUTPATIENT)
Dept: LAB | Facility: HOSPITAL | Age: 67
End: 2017-07-19
Attending: INTERNAL MEDICINE
Payer: MEDICARE

## 2017-07-19 DIAGNOSIS — D50.8 IRON DEFICIENCY ANEMIA SECONDARY TO INADEQUATE DIETARY IRON INTAKE: Primary | ICD-10-CM

## 2017-07-19 LAB
ALBUMIN SERPL BCP-MCNC: 3.1 G/DL
ALP SERPL-CCNC: 83 U/L
ALT SERPL W/O P-5'-P-CCNC: 11 U/L
ANION GAP SERPL CALC-SCNC: 9 MMOL/L
AST SERPL-CCNC: 19 U/L
BASOPHILS # BLD AUTO: 0.04 K/UL
BASOPHILS NFR BLD: 0.5 %
BILIRUB SERPL-MCNC: 0.2 MG/DL
BUN SERPL-MCNC: 10 MG/DL
CALCIUM SERPL-MCNC: 9 MG/DL
CHLORIDE SERPL-SCNC: 103 MMOL/L
CO2 SERPL-SCNC: 31 MMOL/L
CREAT SERPL-MCNC: 0.7 MG/DL
DIFFERENTIAL METHOD: ABNORMAL
EOSINOPHIL # BLD AUTO: 0.3 K/UL
EOSINOPHIL NFR BLD: 3.7 %
ERYTHROCYTE [DISTWIDTH] IN BLOOD BY AUTOMATED COUNT: 17.4 %
EST. GFR  (AFRICAN AMERICAN): >60 ML/MIN/1.73 M^2
EST. GFR  (NON AFRICAN AMERICAN): >60 ML/MIN/1.73 M^2
FERRITIN SERPL-MCNC: 16 NG/ML
GLUCOSE SERPL-MCNC: 163 MG/DL
HCT VFR BLD AUTO: 41 %
HGB BLD-MCNC: 11.6 G/DL
IRON SERPL-MCNC: 71 UG/DL
LYMPHOCYTES # BLD AUTO: 1.8 K/UL
LYMPHOCYTES NFR BLD: 21.4 %
MCH RBC QN AUTO: 25.4 PG
MCHC RBC AUTO-ENTMCNC: 28.3 G/DL
MCV RBC AUTO: 90 FL
MONOCYTES # BLD AUTO: 1 K/UL
MONOCYTES NFR BLD: 11.5 %
NEUTROPHILS # BLD AUTO: 5.3 K/UL
NEUTROPHILS NFR BLD: 62.7 %
PLATELET # BLD AUTO: 212 K/UL
PMV BLD AUTO: 10.4 FL
POTASSIUM SERPL-SCNC: 3.8 MMOL/L
PROT SERPL-MCNC: 6.9 G/DL
RBC # BLD AUTO: 4.57 M/UL
SATURATED IRON: 16 %
SODIUM SERPL-SCNC: 143 MMOL/L
TOTAL IRON BINDING CAPACITY: 434 UG/DL
TRANSFERRIN SERPL-MCNC: 293 MG/DL
WBC # BLD AUTO: 8.42 K/UL

## 2017-07-19 PROCEDURE — 80053 COMPREHEN METABOLIC PANEL: CPT

## 2017-07-19 PROCEDURE — 83540 ASSAY OF IRON: CPT

## 2017-07-19 PROCEDURE — 85025 COMPLETE CBC W/AUTO DIFF WBC: CPT

## 2017-07-19 PROCEDURE — 82728 ASSAY OF FERRITIN: CPT

## 2017-07-23 RX ORDER — IPRATROPIUM BROMIDE AND ALBUTEROL SULFATE 2.5; .5 MG/3ML; MG/3ML
SOLUTION RESPIRATORY (INHALATION)
Qty: 90 ML | Refills: 4 | OUTPATIENT
Start: 2017-07-23

## 2017-07-24 ENCOUNTER — ANTI-COAG VISIT (OUTPATIENT)
Dept: CARDIOLOGY | Facility: CLINIC | Age: 67
End: 2017-07-24

## 2017-07-24 DIAGNOSIS — Z79.01 LONG TERM CURRENT USE OF ANTICOAGULANT THERAPY: ICD-10-CM

## 2017-07-24 LAB — INR PPP: 1.6

## 2017-08-01 RX ORDER — ALBUTEROL SULFATE 90 UG/1
AEROSOL, METERED RESPIRATORY (INHALATION)
Qty: 18 G | Refills: 0 | Status: SHIPPED | OUTPATIENT
Start: 2017-08-01 | End: 2017-09-07 | Stop reason: SDUPTHER

## 2017-08-07 ENCOUNTER — ANTI-COAG VISIT (OUTPATIENT)
Dept: CARDIOLOGY | Facility: CLINIC | Age: 67
End: 2017-08-07

## 2017-08-07 DIAGNOSIS — Z79.01 LONG TERM CURRENT USE OF ANTICOAGULANT THERAPY: ICD-10-CM

## 2017-08-07 LAB — INR PPP: 2.4

## 2017-08-11 ENCOUNTER — TELEPHONE (OUTPATIENT)
Dept: ORTHOPEDICS | Facility: CLINIC | Age: 67
End: 2017-08-11

## 2017-08-11 RX ORDER — NYSTATIN AND TRIAMCINOLONE ACETONIDE 100000; 1 [USP'U]/G; MG/G
CREAM TOPICAL
Qty: 30 G | Refills: 1 | Status: SHIPPED | OUTPATIENT
Start: 2017-08-11 | End: 2017-10-09 | Stop reason: SDUPTHER

## 2017-08-11 NOTE — TELEPHONE ENCOUNTER
----- Message from Jannie iPerre sent at 8/11/2017 12:29 PM CDT -----  Please call patient to schedule injections in knees [phone] (home)

## 2017-08-11 NOTE — TELEPHONE ENCOUNTER
Returned pt's call and left message on her voicemail asking her to call back at [phone] to schedule appt to be seen.

## 2017-08-22 ENCOUNTER — TELEPHONE (OUTPATIENT)
Dept: FAMILY MEDICINE | Facility: CLINIC | Age: 67
End: 2017-08-22

## 2017-08-22 ENCOUNTER — ANTI-COAG VISIT (OUTPATIENT)
Dept: CARDIOLOGY | Facility: CLINIC | Age: 67
End: 2017-08-22

## 2017-08-22 DIAGNOSIS — Z79.01 LONG TERM CURRENT USE OF ANTICOAGULANT THERAPY: ICD-10-CM

## 2017-08-22 LAB — INR PPP: 3.7 (ref 2–3)

## 2017-08-22 RX ORDER — FUROSEMIDE 40 MG/1
TABLET ORAL
Qty: 90 TABLET | Refills: 3 | Status: SHIPPED | OUTPATIENT
Start: 2017-08-22 | End: 2017-10-11 | Stop reason: SDUPTHER

## 2017-08-22 NOTE — TELEPHONE ENCOUNTER
----- Message from Max Arenas sent at 8/22/2017 11:47 AM CDT -----  Contact: Dtr/Maye  Maye called in and requested a message be sent over regarding getting a letter for her mother (attached patient) to get home care.  Maye stated she had a letter from Dr. Bird but lost in because she is moving.  Maye would like a call when ready for  at [phone]

## 2017-08-22 NOTE — TELEPHONE ENCOUNTER
Patient's daughter (Maye) requesting letter regarding patient receiving home care. States she had a letter from Dr. Bird but lost the letter while moving. Upon further inspection a letter was noted in patient's chart dated 4-20-17.  Letter printed. Mrs. Villavicencio notified letter available at check in desk. Verbalized understanding.

## 2017-08-22 NOTE — PROGRESS NOTES
Attempted to call pt for questioning number in chart is not in service. Will attempt to call at a later time.

## 2017-08-23 ENCOUNTER — HOSPITAL ENCOUNTER (OUTPATIENT)
Dept: RADIOLOGY | Facility: CLINIC | Age: 67
Discharge: HOME | End: 2017-08-23
Attending: FAMILY MEDICINE
Payer: MEDICARE

## 2017-08-23 DIAGNOSIS — Z12.31 OTHER SCREENING MAMMOGRAM: ICD-10-CM

## 2017-08-23 PROCEDURE — 77067 SCR MAMMO BI INCL CAD: CPT | Mod: TC

## 2017-08-23 PROCEDURE — 77063 BREAST TOMOSYNTHESIS BI: CPT | Mod: 26,,, | Performed by: RADIOLOGY

## 2017-08-23 PROCEDURE — 77067 SCR MAMMO BI INCL CAD: CPT | Mod: 26,,, | Performed by: RADIOLOGY

## 2017-08-24 NOTE — PROGRESS NOTES
Pt did not hold last night we could not get in touch with her, please advise on what you would like for her to do.

## 2017-08-31 ENCOUNTER — ANTI-COAG VISIT (OUTPATIENT)
Dept: CARDIOLOGY | Facility: CLINIC | Age: 67
End: 2017-08-31
Payer: MEDICARE

## 2017-08-31 ENCOUNTER — TELEPHONE (OUTPATIENT)
Dept: FAMILY MEDICINE | Facility: CLINIC | Age: 67
End: 2017-08-31

## 2017-08-31 DIAGNOSIS — I48.92 ATRIAL FLUTTER, UNSPECIFIED TYPE: ICD-10-CM

## 2017-08-31 DIAGNOSIS — Z79.01 LONG TERM CURRENT USE OF ANTICOAGULANT THERAPY: Primary | ICD-10-CM

## 2017-08-31 LAB — INR PPP: 2.9 (ref 2–3)

## 2017-08-31 PROCEDURE — 99211 OFF/OP EST MAY X REQ PHY/QHP: CPT | Mod: 25,S$GLB,,

## 2017-08-31 PROCEDURE — 85610 PROTHROMBIN TIME: CPT | Mod: QW,S$GLB,,

## 2017-08-31 NOTE — TELEPHONE ENCOUNTER
"Patient requesting a refill of Hydrocodone. Patient received printed prescriptions at last office visit for the months of August, September, and October 2017. Writer advised patient of above, patient states she does not remember receiving these printed prescriptions. States " I don't remember receiving any prescriptions and if I did receive them I must have lost them."  Advised patient a message will be sent to provider for notification. Please be advised.   "

## 2017-08-31 NOTE — PROGRESS NOTES
Patient confirms dose and compliance. INR in range, will reduce dose and watch close until in range and stable.

## 2017-08-31 NOTE — TELEPHONE ENCOUNTER
----- Message from Abi Wang sent at 8/31/2017  3:33 PM CDT -----  Contact: self   Patient need a new RX for [place] please send to [facility], any questions please call back at [phone] (home)       [facility] [address]  [address] [address]  Phone: [phone] Fax: [phone]

## 2017-09-01 NOTE — TELEPHONE ENCOUNTER
Call placed to patient for notification that no further refill of pain medication can be approved until November. Patient states she found her printed prescriptions and forgot to call office for notification. Dr. Bird notified verbally.

## 2017-09-07 DIAGNOSIS — E11.59 HYPERTENSION ASSOCIATED WITH DIABETES: ICD-10-CM

## 2017-09-07 DIAGNOSIS — I15.2 HYPERTENSION ASSOCIATED WITH DIABETES: ICD-10-CM

## 2017-09-07 RX ORDER — ALBUTEROL SULFATE 90 UG/1
AEROSOL, METERED RESPIRATORY (INHALATION)
Qty: 18 G | Refills: 0 | Status: SHIPPED | OUTPATIENT
Start: 2017-09-07 | End: 2017-11-02 | Stop reason: SDUPTHER

## 2017-09-07 RX ORDER — METOPROLOL SUCCINATE 25 MG/1
TABLET, EXTENDED RELEASE ORAL
Qty: 45 TABLET | Refills: 3 | Status: SHIPPED | OUTPATIENT
Start: 2017-09-07 | End: 2018-05-07 | Stop reason: SDUPTHER

## 2017-09-07 RX ORDER — ALBUTEROL SULFATE 0.63 MG/3ML
SOLUTION RESPIRATORY (INHALATION)
Qty: 75 ML | Refills: 11 | Status: ON HOLD | OUTPATIENT
Start: 2017-09-07 | End: 2017-09-18 | Stop reason: HOSPADM

## 2017-09-07 RX ORDER — POLYETHYLENE GLYCOL 3350 17 G/17G
POWDER, FOR SOLUTION ORAL
Qty: 1530 G | Refills: 0 | Status: SHIPPED | OUTPATIENT
Start: 2017-09-07 | End: 2017-10-25 | Stop reason: SDUPTHER

## 2017-09-07 RX ORDER — LISINOPRIL 20 MG/1
TABLET ORAL
Qty: 90 TABLET | Refills: 1 | Status: SHIPPED | OUTPATIENT
Start: 2017-09-07 | End: 2017-10-12 | Stop reason: SDUPTHER

## 2017-09-07 RX ORDER — ATORVASTATIN CALCIUM 20 MG/1
20 TABLET, FILM COATED ORAL DAILY
Qty: 90 TABLET | Refills: 3 | Status: SHIPPED | OUTPATIENT
Start: 2017-09-07 | End: 2018-06-23 | Stop reason: SDUPTHER

## 2017-09-07 RX ORDER — METHOCARBAMOL 750 MG/1
TABLET, FILM COATED ORAL
Qty: 360 TABLET | Refills: 1 | Status: SHIPPED | OUTPATIENT
Start: 2017-09-07 | End: 2017-10-25

## 2017-09-09 RX ORDER — UMECLIDINIUM BROMIDE AND VILANTEROL TRIFENATATE 62.5; 25 UG/1; UG/1
1 POWDER RESPIRATORY (INHALATION)
Qty: 60 EACH | Refills: 3 | Status: SHIPPED | OUTPATIENT
Start: 2017-09-09 | End: 2017-10-25 | Stop reason: SDDI

## 2017-09-09 RX ORDER — CEPHALEXIN 500 MG/1
CAPSULE ORAL
Qty: 90 CAPSULE | Refills: 1 | Status: SHIPPED | OUTPATIENT
Start: 2017-09-09 | End: 2017-10-11 | Stop reason: ALTCHOICE

## 2017-09-09 RX ORDER — CALCIUM CITRATE/VITAMIN D3 200MG-6.25
TABLET ORAL
Qty: 300 STRIP | Refills: 3 | Status: SHIPPED | OUTPATIENT
Start: 2017-09-09 | End: 2018-05-22 | Stop reason: SDUPTHER

## 2017-09-09 RX ORDER — IPRATROPIUM BROMIDE AND ALBUTEROL SULFATE 2.5; .5 MG/3ML; MG/3ML
SOLUTION RESPIRATORY (INHALATION)
Qty: 90 ML | Refills: 4 | Status: SHIPPED | OUTPATIENT
Start: 2017-09-09 | End: 2018-06-02

## 2017-09-17 ENCOUNTER — HOSPITAL ENCOUNTER (OUTPATIENT)
Facility: HOSPITAL | Age: 67
Discharge: HOME HEALTH | End: 2017-09-18
Attending: EMERGENCY MEDICINE | Admitting: HOSPITALIST
Payer: MEDICARE

## 2017-09-17 DIAGNOSIS — R07.9 CHEST PAIN: Primary | ICD-10-CM

## 2017-09-17 DIAGNOSIS — E66.01 OBESITY, CLASS III, BMI 40-49.9 (MORBID OBESITY): ICD-10-CM

## 2017-09-17 LAB
ALBUMIN SERPL BCP-MCNC: 3.3 G/DL
ALP SERPL-CCNC: 101 U/L
ALT SERPL W/O P-5'-P-CCNC: 21 U/L
ANION GAP SERPL CALC-SCNC: 11 MMOL/L
AST SERPL-CCNC: 22 U/L
BASOPHILS # BLD AUTO: 0.1 K/UL
BASOPHILS NFR BLD: 0.6 %
BILIRUB SERPL-MCNC: 0.3 MG/DL
BNP SERPL-MCNC: 54 PG/ML
BUN SERPL-MCNC: 17 MG/DL
CALCIUM SERPL-MCNC: 9.8 MG/DL
CHLORIDE SERPL-SCNC: 99 MMOL/L
CO2 SERPL-SCNC: 33 MMOL/L
CREAT SERPL-MCNC: 0.8 MG/DL
DIFFERENTIAL METHOD: ABNORMAL
EOSINOPHIL # BLD AUTO: 0.4 K/UL
EOSINOPHIL NFR BLD: 3.3 %
ERYTHROCYTE [DISTWIDTH] IN BLOOD BY AUTOMATED COUNT: 18.5 %
EST. GFR  (AFRICAN AMERICAN): >60 ML/MIN/1.73 M^2
EST. GFR  (NON AFRICAN AMERICAN): >60 ML/MIN/1.73 M^2
GLUCOSE SERPL-MCNC: 186 MG/DL
HCT VFR BLD AUTO: 41.4 %
HGB BLD-MCNC: 13.3 G/DL
INR PPP: 2.3
LYMPHOCYTES # BLD AUTO: 1.8 K/UL
LYMPHOCYTES NFR BLD: 16.3 %
MCH RBC QN AUTO: 27 PG
MCHC RBC AUTO-ENTMCNC: 32.2 G/DL
MCV RBC AUTO: 84 FL
MONOCYTES # BLD AUTO: 1 K/UL
MONOCYTES NFR BLD: 8.7 %
NEUTROPHILS # BLD AUTO: 8.1 K/UL
NEUTROPHILS NFR BLD: 71.1 %
PLATELET # BLD AUTO: 229 K/UL
PMV BLD AUTO: 8.6 FL
POCT GLUCOSE: 160 MG/DL (ref 70–110)
POTASSIUM SERPL-SCNC: 3.8 MMOL/L
PROT SERPL-MCNC: 7.5 G/DL
PROTHROMBIN TIME: 23.4 SEC
RBC # BLD AUTO: 4.93 M/UL
SODIUM SERPL-SCNC: 143 MMOL/L
TROPONIN I SERPL DL<=0.01 NG/ML-MCNC: <0.006 NG/ML
TROPONIN I SERPL DL<=0.01 NG/ML-MCNC: <0.006 NG/ML
WBC # BLD AUTO: 11.4 K/UL

## 2017-09-17 PROCEDURE — 80053 COMPREHEN METABOLIC PANEL: CPT

## 2017-09-17 PROCEDURE — 25000003 PHARM REV CODE 250: Performed by: HOSPITALIST

## 2017-09-17 PROCEDURE — 85025 COMPLETE CBC W/AUTO DIFF WBC: CPT

## 2017-09-17 PROCEDURE — 25000003 PHARM REV CODE 250: Performed by: EMERGENCY MEDICINE

## 2017-09-17 PROCEDURE — 83036 HEMOGLOBIN GLYCOSYLATED A1C: CPT

## 2017-09-17 PROCEDURE — 83880 ASSAY OF NATRIURETIC PEPTIDE: CPT

## 2017-09-17 PROCEDURE — 93005 ELECTROCARDIOGRAM TRACING: CPT

## 2017-09-17 PROCEDURE — 36415 COLL VENOUS BLD VENIPUNCTURE: CPT

## 2017-09-17 PROCEDURE — G0378 HOSPITAL OBSERVATION PER HR: HCPCS

## 2017-09-17 PROCEDURE — 94640 AIRWAY INHALATION TREATMENT: CPT

## 2017-09-17 PROCEDURE — 25000003 PHARM REV CODE 250: Performed by: NURSE PRACTITIONER

## 2017-09-17 PROCEDURE — 99284 EMERGENCY DEPT VISIT MOD MDM: CPT

## 2017-09-17 PROCEDURE — 85610 PROTHROMBIN TIME: CPT

## 2017-09-17 PROCEDURE — 94760 N-INVAS EAR/PLS OXIMETRY 1: CPT

## 2017-09-17 PROCEDURE — 84484 ASSAY OF TROPONIN QUANT: CPT | Mod: 91

## 2017-09-17 PROCEDURE — 84484 ASSAY OF TROPONIN QUANT: CPT

## 2017-09-17 PROCEDURE — 93010 ELECTROCARDIOGRAM REPORT: CPT | Mod: ,,, | Performed by: INTERNAL MEDICINE

## 2017-09-17 PROCEDURE — 27000221 HC OXYGEN, UP TO 24 HOURS

## 2017-09-17 RX ORDER — IBUPROFEN 200 MG
16 TABLET ORAL
Status: DISCONTINUED | OUTPATIENT
Start: 2017-09-17 | End: 2017-09-17

## 2017-09-17 RX ORDER — NITROGLYCERIN 0.4 MG/1
0.4 TABLET SUBLINGUAL EVERY 5 MIN PRN
Status: DISCONTINUED | OUTPATIENT
Start: 2017-09-17 | End: 2017-09-18 | Stop reason: HOSPADM

## 2017-09-17 RX ORDER — GLUCAGON 1 MG
1 KIT INJECTION
Status: DISCONTINUED | OUTPATIENT
Start: 2017-09-17 | End: 2017-09-18 | Stop reason: HOSPADM

## 2017-09-17 RX ORDER — POLYETHYLENE GLYCOL 3350 17 G/17G
17 POWDER, FOR SOLUTION ORAL ONCE
Status: COMPLETED | OUTPATIENT
Start: 2017-09-17 | End: 2017-09-17

## 2017-09-17 RX ORDER — INSULIN ASPART 100 [IU]/ML
0-5 INJECTION, SOLUTION INTRAVENOUS; SUBCUTANEOUS
Status: DISCONTINUED | OUTPATIENT
Start: 2017-09-17 | End: 2017-09-18 | Stop reason: HOSPADM

## 2017-09-17 RX ORDER — CEPHALEXIN 500 MG/1
500 CAPSULE ORAL DAILY
Status: DISCONTINUED | OUTPATIENT
Start: 2017-09-18 | End: 2017-09-18 | Stop reason: HOSPADM

## 2017-09-17 RX ORDER — ATORVASTATIN CALCIUM 20 MG/1
20 TABLET, FILM COATED ORAL DAILY
Status: DISCONTINUED | OUTPATIENT
Start: 2017-09-18 | End: 2017-09-18 | Stop reason: HOSPADM

## 2017-09-17 RX ORDER — LISINOPRIL 10 MG/1
20 TABLET ORAL DAILY
Status: DISCONTINUED | OUTPATIENT
Start: 2017-09-18 | End: 2017-09-18 | Stop reason: HOSPADM

## 2017-09-17 RX ORDER — LEVALBUTEROL INHALATION SOLUTION 0.63 MG/3ML
0.63 SOLUTION RESPIRATORY (INHALATION) EVERY 8 HOURS
Status: DISCONTINUED | OUTPATIENT
Start: 2017-09-18 | End: 2017-09-18 | Stop reason: HOSPADM

## 2017-09-17 RX ORDER — BUPROPION HYDROCHLORIDE 75 MG/1
75 TABLET ORAL 2 TIMES DAILY
Status: DISCONTINUED | OUTPATIENT
Start: 2017-09-17 | End: 2017-09-18 | Stop reason: HOSPADM

## 2017-09-17 RX ORDER — HYDROCODONE BITARTRATE AND ACETAMINOPHEN 10; 325 MG/1; MG/1
1 TABLET ORAL EVERY 6 HOURS PRN
Status: DISCONTINUED | OUTPATIENT
Start: 2017-09-17 | End: 2017-09-18 | Stop reason: HOSPADM

## 2017-09-17 RX ORDER — CLONAZEPAM 1 MG/1
1 TABLET ORAL 2 TIMES DAILY
Status: DISCONTINUED | OUTPATIENT
Start: 2017-09-17 | End: 2017-09-18 | Stop reason: HOSPADM

## 2017-09-17 RX ORDER — GLUCAGON 1 MG
1 KIT INJECTION
Status: DISCONTINUED | OUTPATIENT
Start: 2017-09-17 | End: 2017-09-17

## 2017-09-17 RX ORDER — IBUPROFEN 200 MG
1 TABLET ORAL DAILY
Status: DISCONTINUED | OUTPATIENT
Start: 2017-09-18 | End: 2017-09-18 | Stop reason: HOSPADM

## 2017-09-17 RX ORDER — IBUPROFEN 200 MG
16 TABLET ORAL
Status: DISCONTINUED | OUTPATIENT
Start: 2017-09-17 | End: 2017-09-18 | Stop reason: HOSPADM

## 2017-09-17 RX ORDER — METHOCARBAMOL 750 MG/1
750 TABLET, FILM COATED ORAL 4 TIMES DAILY
Status: DISCONTINUED | OUTPATIENT
Start: 2017-09-18 | End: 2017-09-18 | Stop reason: HOSPADM

## 2017-09-17 RX ORDER — GABAPENTIN 400 MG/1
800 CAPSULE ORAL 3 TIMES DAILY
Status: DISCONTINUED | OUTPATIENT
Start: 2017-09-18 | End: 2017-09-18 | Stop reason: HOSPADM

## 2017-09-17 RX ORDER — POLYETHYLENE GLYCOL 3350 17 G/17G
17 POWDER, FOR SOLUTION ORAL ONCE
Status: DISCONTINUED | OUTPATIENT
Start: 2017-09-17 | End: 2017-09-17 | Stop reason: SDUPTHER

## 2017-09-17 RX ORDER — WARFARIN 2.5 MG/1
2.5 TABLET ORAL DAILY
Status: DISCONTINUED | OUTPATIENT
Start: 2017-09-18 | End: 2017-09-18 | Stop reason: HOSPADM

## 2017-09-17 RX ORDER — IBUPROFEN 200 MG
24 TABLET ORAL
Status: DISCONTINUED | OUTPATIENT
Start: 2017-09-17 | End: 2017-09-18 | Stop reason: HOSPADM

## 2017-09-17 RX ORDER — INSULIN ASPART 100 [IU]/ML
0-5 INJECTION, SOLUTION INTRAVENOUS; SUBCUTANEOUS
Status: DISCONTINUED | OUTPATIENT
Start: 2017-09-17 | End: 2017-09-17

## 2017-09-17 RX ORDER — DULOXETIN HYDROCHLORIDE 30 MG/1
60 CAPSULE, DELAYED RELEASE ORAL DAILY
Status: DISCONTINUED | OUTPATIENT
Start: 2017-09-18 | End: 2017-09-18 | Stop reason: HOSPADM

## 2017-09-17 RX ORDER — MICONAZOLE NITRATE 2 %
POWDER (GRAM) TOPICAL 2 TIMES DAILY
Status: DISCONTINUED | OUTPATIENT
Start: 2017-09-17 | End: 2017-09-18 | Stop reason: HOSPADM

## 2017-09-17 RX ORDER — ASPIRIN 325 MG
325 TABLET ORAL DAILY
Status: DISCONTINUED | OUTPATIENT
Start: 2017-09-18 | End: 2017-09-18 | Stop reason: HOSPADM

## 2017-09-17 RX ORDER — ASPIRIN 325 MG
325 TABLET ORAL
Status: COMPLETED | OUTPATIENT
Start: 2017-09-17 | End: 2017-09-17

## 2017-09-17 RX ORDER — IBUPROFEN 200 MG
24 TABLET ORAL
Status: DISCONTINUED | OUTPATIENT
Start: 2017-09-17 | End: 2017-09-17

## 2017-09-17 RX ADMIN — ASPIRIN 325 MG ORAL TABLET 325 MG: 325 PILL ORAL at 07:09

## 2017-09-17 RX ADMIN — Medication: at 11:09

## 2017-09-17 RX ADMIN — CLONAZEPAM 1 MG: 1 TABLET ORAL at 10:09

## 2017-09-17 RX ADMIN — LEVALBUTEROL HYDROCHLORIDE 0.63 MG: 0.63 SOLUTION RESPIRATORY (INHALATION) at 11:09

## 2017-09-17 RX ADMIN — POLYETHYLENE GLYCOL 3350 17 G: 17 POWDER, FOR SOLUTION ORAL at 11:09

## 2017-09-17 RX ADMIN — NITROGLYCERIN 0.4 MG: 0.4 TABLET SUBLINGUAL at 07:09

## 2017-09-17 RX ADMIN — HYDROCODONE BITARTRATE AND ACETAMINOPHEN 1 TABLET: 10; 325 TABLET ORAL at 10:09

## 2017-09-17 RX ADMIN — TRAZODONE HYDROCHLORIDE 75 MG: 50 TABLET ORAL at 10:09

## 2017-09-18 ENCOUNTER — OUTPATIENT CASE MANAGEMENT (OUTPATIENT)
Dept: ADMINISTRATIVE | Facility: OTHER | Age: 67
End: 2017-09-18

## 2017-09-18 VITALS
OXYGEN SATURATION: 96 % | TEMPERATURE: 97 F | HEIGHT: 68 IN | RESPIRATION RATE: 18 BRPM | DIASTOLIC BLOOD PRESSURE: 60 MMHG | HEART RATE: 75 BPM | WEIGHT: 291 LBS | SYSTOLIC BLOOD PRESSURE: 116 MMHG | BODY MASS INDEX: 44.1 KG/M2

## 2017-09-18 PROBLEM — I20.9 ANGINA PECTORIS: Status: ACTIVE | Noted: 2017-09-18

## 2017-09-18 LAB
ESTIMATED AVG GLUCOSE: 131 MG/DL
HBA1C MFR BLD HPLC: 6.2 %
INR PPP: 2.6
POCT GLUCOSE: 109 MG/DL (ref 70–110)
PROTHROMBIN TIME: 26.4 SEC
TROPONIN I SERPL DL<=0.01 NG/ML-MCNC: <0.006 NG/ML

## 2017-09-18 PROCEDURE — 84484 ASSAY OF TROPONIN QUANT: CPT

## 2017-09-18 PROCEDURE — G0378 HOSPITAL OBSERVATION PER HR: HCPCS

## 2017-09-18 PROCEDURE — 36415 COLL VENOUS BLD VENIPUNCTURE: CPT

## 2017-09-18 PROCEDURE — 82962 GLUCOSE BLOOD TEST: CPT

## 2017-09-18 PROCEDURE — 94761 N-INVAS EAR/PLS OXIMETRY MLT: CPT

## 2017-09-18 PROCEDURE — 99215 OFFICE O/P EST HI 40 MIN: CPT | Mod: ,,, | Performed by: INTERNAL MEDICINE

## 2017-09-18 PROCEDURE — 25000242 PHARM REV CODE 250 ALT 637 W/ HCPCS: Performed by: NURSE PRACTITIONER

## 2017-09-18 PROCEDURE — 94640 AIRWAY INHALATION TREATMENT: CPT

## 2017-09-18 PROCEDURE — 85610 PROTHROMBIN TIME: CPT

## 2017-09-18 PROCEDURE — 25000003 PHARM REV CODE 250: Performed by: NURSE PRACTITIONER

## 2017-09-18 PROCEDURE — 27000221 HC OXYGEN, UP TO 24 HOURS

## 2017-09-18 RX ADMIN — CLONAZEPAM 1 MG: 1 TABLET ORAL at 09:09

## 2017-09-18 RX ADMIN — Medication: at 09:09

## 2017-09-18 RX ADMIN — METHOCARBAMOL 750 MG: 750 TABLET ORAL at 06:09

## 2017-09-18 RX ADMIN — ASPIRIN 325 MG ORAL TABLET 325 MG: 325 PILL ORAL at 09:09

## 2017-09-18 RX ADMIN — METOPROLOL SUCCINATE 12.5 MG: 25 TABLET, EXTENDED RELEASE ORAL at 10:09

## 2017-09-18 RX ADMIN — ATORVASTATIN CALCIUM 20 MG: 20 TABLET, FILM COATED ORAL at 09:09

## 2017-09-18 RX ADMIN — LISINOPRIL 20 MG: 10 TABLET ORAL at 10:09

## 2017-09-18 RX ADMIN — CEPHALEXIN 500 MG: 500 CAPSULE ORAL at 09:09

## 2017-09-18 RX ADMIN — GABAPENTIN 800 MG: 400 CAPSULE ORAL at 06:09

## 2017-09-18 RX ADMIN — LEVALBUTEROL HYDROCHLORIDE 0.63 MG: 0.63 SOLUTION RESPIRATORY (INHALATION) at 07:09

## 2017-09-18 NOTE — ASSESSMENT & PLAN NOTE
Chronic  Currently on the low side of normal  Continue current medications  Monitor and treat accordingly     DM controlled  Hold oral agents  Accuchecks with ISS

## 2017-09-18 NOTE — PROGRESS NOTES
09/18/17 0711   Patient Assessment/Suction   Level of Consciousness (AVPU) alert   Respiratory Effort Normal;Unlabored   All Lung Fields Breath Sounds coarse   PRE-TX-O2-ETCO2   O2 Device (Oxygen Therapy) nasal cannula   $ Is the patient on Oxygen? Yes   Flow (L/min) 2   Oxygen Concentration (%) 28   SpO2 (!) 86 %  (pt not wearing oxygen, placed pt on 2 lpm spo2 increased )   Pulse Oximetry Type Intermittent   $ Pulse Oximetry - Multiple Charge Pulse Oximetry - Multiple   Pulse (!) 56   Resp 18   Aerosol Therapy   $ Aerosol Therapy Charges Aerosol Treatment   Respiratory Treatment Status given   SVN/Inhaler Treatment Route mask   Position During Treatment HOB at 45 degrees   Patient Tolerance good   Post-Treatment   Post-treatment Heart Rate (beats/min) 58   Post-treatment Resp Rate (breaths/min) 18   All Fields Breath Sounds aeration increased   Ready to Wean/Extubation Screen   FIO2<=50 (chart decimal) 0.28

## 2017-09-18 NOTE — DISCHARGE SUMMARY
"[facility] Hospital Medicine  Discharge Summary      Patient Name: Nelly Tian  MRN: [id number]  Admission Date: 9/17/2017  Hospital Length of Stay: 0 days  Discharge Date and Time: 9/18/2017 11:32 AM  Attending Physician: No att. providers found   Discharging Provider: Ca Dupont NP  Primary Care Provider: Constantine Bird MD      HPI:   Pt is a 65 yo female  admitted to the services of hospital medicine via the ER for c/o chest pain. Pt states she has had left sided chest pain off and on through the weekend. On Saturday, had attributed it to eating red beans and rice. On Sunday while at rest, had acute onset of chest pain, radiation to left shoulder, associated with an increase in SOB and nausea. Pain continued to "come and go" did achieve relief after arrival here and receiving NTG. LHC by Dr. Torres in 2014 which was negative. Hx of chronic atrial fib of ~20 years, on coumadin. Hx of COPD and on home 02. Continues to smoke. Hx of DM, BS typically run 120-150, recently above 200 but pt not sure if glucometer working correctly since dropping it.  Currently, chest pain free. Pt attributes the chest pain to emotional stress. Has large abdominal hernia X 6 years, recent skin infection, continues to take Keflex daily.     * No surgery found *      Indwelling Lines/Drains at time of discharge:   Lines/Drains/Airways     Pressure Ulcer                 Pressure Ulcer 03/20/17 0829 Stage  days              Hospital Course:   Troponin trended and remained negative. Telemetry with no arrhythmias noted. Cardiology consulted. Patient cleared for Dc from cardiology standpoint. She is chest pain free since obs admission.     PE; chest CTA heart RRR. Abs: obese with large hernia.     Consults:   Consults         Status Ordering Provider     Inpatient consult to Cardiology  Once     Provider:  Chu Mack MD    Completed CELINE PARR     IP consult to case management  Once   "   Provider:  (Not yet assigned)    Completed LASHAWN SAHNI          Significant Diagnostic Studies: Labs:   BMP:   Recent Labs  Lab 09/17/17  1950   *      K 3.8   CL 99   CO2 33*   BUN 17   CREATININE 0.8   CALCIUM 9.8   , CMP   Recent Labs  Lab 09/17/17  1950      K 3.8   CL 99   CO2 33*   *   BUN 17   CREATININE 0.8   CALCIUM 9.8   PROT 7.5   ALBUMIN 3.3*   BILITOT 0.3   ALKPHOS 101   AST 22   ALT 21   ANIONGAP 11   ESTGFRAFRICA >60   EGFRNONAA >60   , Lipid Panel   Lab Results   Component Value Date    CHOL 117 (L) 05/08/2017    HDL 33 (L) 05/08/2017    LDLCALC 38.6 (L) 05/08/2017    TRIG 227 (H) 05/08/2017    CHOLHDL 28.2 05/08/2017    and Troponin   Recent Labs  Lab 09/18/17  0527   TROPONINI <0.006     Radiology: X-Ray: CXR: X-Ray Chest 1 View (CXR): No results found for this visit on 09/17/17.    Pending Diagnostic Studies:     None        Final Active Diagnoses:    Diagnosis Date Noted POA    PRINCIPAL PROBLEM:  Chest pain [R07.9] 09/17/2017 Yes    Angina pectoris [I20.9] 09/18/2017 Yes    Obesity, Class III, BMI 40-49.9 (morbid obesity) [E66.01] 11/16/2016 Yes    Hypercapnic respiratory failure, chronic [J96.12] 11/16/2016 Yes    Hypertension associated with diabetes [E11.59, I10] 01/19/2016 Yes    Tobacco dependency [F17.200] 12/18/2014 Yes      Problems Resolved During this Admission:    Diagnosis Date Noted Date Resolved POA      No new Assessment & Plan notes have been filed under this hospital service since the last note was generated.  Service: Hospital Medicine      Discharged Condition: stable    Disposition: Home or Self Care    Follow Up:  Follow-up Information     Chu Mack MD In 2 weeks.    Specialty:  Cardiology  Why:  Hospital follow up, can disucss outpatient angiogram at f/u visit  Contact information:  [address]  [address]  [place] [address]  [phone]             Julianna Tolbert MD In 2 weeks.    Specialty:  Pulmonary Disease  Why:  Please  follow up with your pulmonologist in 2 weeks for sleep apnea and COPD evaluation   Contact information:  [address]  [address]  [place] [address] [phone]             Constantine Bird MD In 1 week.    Specialty:  Family Medicine  Contact information:  [address] [place] [id number] [phone]                 Patient Instructions:     Ambulatory referral to Outpatient Case Management   Referral Priority: Routine Referral Type: Consultation   Referral Reason: Specialty Services Required    Number of Visits Requested: 1      Referral to Home health   Referral Priority: Routine Referral Type: Home Health Care   Referral Reason: Specialty Services Required    Requested Specialty: Home Health Services    Number of Visits Requested: 1      Diet general   Order Specific Question Answer Comments   Additional restrictions: Cardiac (Low Na/Chol)      Activity as tolerated     Call MD for:  severe uncontrolled pain     Call MD for:  persistent dizziness, light-headedness, or visual disturbances       Medications:  Reconciled Home Medications:   Discharge Medication List as of 9/18/2017 10:54 AM      CONTINUE these medications which have NOT CHANGED    Details   albuterol-ipratropium 2.5mg-0.5mg/3mL (DUO-NEB) 0.5 mg-3 mg(2.5 mg base)/3 mL nebulizer solution INHALE THE CONTENTS OF 1 VIAL VIA NEBULIZER EVERY 6 HOURS AS NEEDED FOR  WHEEZING (DO NOT USE WITH ALBUTEROL INHALER), Normal      ANORO ELLIPTA 62.5-25 mcg/actuation DsDv INHALE 1 PUFF INTO THE LUNGS ONCE DAILY AT 6AM. CONTROLLER, Starting Sat 9/9/2017, Normal      ascorbic acid, vitamin C, (VITAMIN C) 500 MG tablet Take 1 tablet (500 mg total) by mouth every evening., Starting 4/11/2017, Until Discontinued, OTC      atorvastatin (LIPITOR) 20 MG tablet TAKE 1 TABLET (20 MG TOTAL) BY MOUTH ONCE DAILY., Starting Thu 9/7/2017, Until Fri 9/7/2018, Normal      buPROPion (WELLBUTRIN) 75 MG tablet Take 1 tablet (75 mg total) by mouth 2 (two) times daily., Starting  Wed 6/14/2017, Until Thu 6/14/2018, Normal      cephALEXin (KEFLEX) 500 MG capsule TAKE 1 CAPSULE (500 MG TOTAL) BY MOUTH ONCE DAILY AT 6:00A.M., Normal      !! clonazePAM (KLONOPIN) 1 MG tablet Take 1 tablet (1 mg total) by mouth 2 (two) times daily as needed., Starting 5/8/2017, Until Discontinued, Print      !! clonazePAM (KLONOPIN) 1 MG tablet TAKE ONE TABLET BY MOUTH TWICE DAILY AS NEEDED FOR ANXIETY, Normal      furosemide (LASIX) 40 MG tablet TAKE ONE TABLET ONCE DAILY FOR FLUID OVERLOAD, Normal      gabapentin (NEURONTIN) 800 MG tablet Take 1 tablet (800 mg total) by mouth 3 (three) times daily., Starting Tue 7/18/2017, Until Wed 7/18/2018, Print      !! hydrocodone-acetaminophen 10-325mg (NORCO)  mg Tab Take 1 tablet by mouth every 6 (six) hours as needed for Pain., Starting Fri 8/18/2017, Print      !! hydrocodone-acetaminophen 10-325mg (NORCO)  mg Tab Take 1 tablet by mouth every 6 (six) hours as needed for Pain., Starting Mon 9/18/2017, Print      !! hydrocodone-acetaminophen 10-325mg (NORCO)  mg Tab Take 1 tablet by mouth every 6 (six) hours as needed for Pain., Starting Thu 10/19/2017, Print      levalbuterol (XOPENEX) 0.63 mg/3 mL nebulizer solution INHALE THE CONTENTS OF 1 VIAL BY NEBULIZATION 3 (THREE) TIMES DAILY., Normal      lisinopril (PRINIVIL,ZESTRIL) 20 MG tablet TAKE 1 TABLET EVERY DAY, Normal      magnesium oxide (MAG-OX) 400 mg tablet Take 1 tablet (400 mg total) by mouth 2 (two) times daily., Starting 4/11/2017, Until Discontinued, OTC      metformin (GLUCOPHAGE) 500 MG tablet TAKE 1 TABLET TWICE DAILY WITH MEALS, Normal      methocarbamol (ROBAXIN) 750 MG Tab TAKE 1 TABLET FOUR TIMES DAILY, Normal      metoprolol succinate (TOPROL-XL) 25 MG 24 hr tablet TAKE 1/2 TABLET EVERY DAY, Normal      multivitamin (THERAGRAN) tablet Take 1 tablet by mouth once daily., Starting 11/5/2016, Until Discontinued, OTC      nicotine (NICODERM CQ) 21 mg/24 hr Place 1 patch onto the skin  once daily., Starting Mon 6/12/2017, Normal      nystatin (MYCOSTATIN) powder Apply topically 4 (four) times daily., Starting Mon 6/19/2017, Normal      nystatin-triamcinolone (MYCOLOG II) cream APPLY SPARINGLY TO AFFECTED AREA 3 TIMES A DAY AS NEEDED, Normal      polyethylene glycol (GLYCOLAX) 17 gram/dose powder MIX 1 CAPFUL (17GM) IN LIQUID AND DRINK BEFORE BREAKFAST, Normal      potassium chloride SA (K-DUR,KLOR-CON) 20 MEQ tablet Take 1 tablet (20 mEq total) by mouth once daily., Starting 5/4/2017, Until Discontinued, Normal      temazepam (RESTORIL) 15 mg Cap Take 1 capsule (15 mg total) by mouth nightly as needed., Starting Tue 7/18/2017, Print      trazodone (DESYREL) 150 MG tablet Take 0.5 tablets (75 mg total) by mouth every evening., Starting Tue 7/18/2017, No Print      TRUE METRIX GLUCOSE TEST STRIP Strp TEST BLOOD SUGAR THREE TIMES DAILY, Normal      VENTOLIN HFA 90 mcg/actuation inhaler INHALE TWO PUFFS EVERY 6 HOURS AS NEEDED FOR WHEEZING, Normal      warfarin (COUMADIN) 5 MG tablet Take 0.5 tablets (2.5 mg total) by mouth Daily., Starting 4/11/2017, Until Discontinued, No Print       !! - Potential duplicate medications found. Please discuss with provider.      STOP taking these medications       albuterol (ACCUNEB) 0.63 mg/3 mL Nebu Comments:   Reason for Stopping:         duloxetine (CYMBALTA) 60 MG capsule Comments:   Reason for Stopping:             Time spent on the discharge of patient: 24 minutes    HOS POC IP DISCHARGE SUMMARY    Ca Dupont NP  Department of Hospital Medicine  [facility]

## 2017-09-18 NOTE — SUBJECTIVE & OBJECTIVE
Past Medical History:   Diagnosis Date    *Atrial flutter     Anxiety     Arthritis     Asthma     Atrial fibrillation     Back pain     Cataract     OD    CHF (congestive heart failure)     COPD (chronic obstructive pulmonary disease)     Depression     Diabetes mellitus     Emphysema of lung     Heart failure     Hernia     Hypercapnic respiratory failure, chronic 2016    Hyperlipidemia     Hypertension     Iron deficiency anemia 2/3/2016    Myocardial infarction     Obesity     Peripheral vascular disease     Pneumonia     Polyneuropathy     Retinal detachment     OS    Septic shock 2017    Skin ulcer 3/18/2017    Tobacco dependence     Type II 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, not stated as uncontrolled        Past Surgical History:   Procedure Laterality Date    BREAST BIOPSY Left 10 yrs ago    benign    CATARACT EXTRACTION Left     OS      SECTION      x2    EYE SURGERY      HYSTERECTOMY      OOPHORECTOMY      one ovary conserved    RETINAL DETACHMENT SURGERY      buckle --OS    TONSILLECTOMY         Review of patient's allergies indicates:   Allergen Reactions    Dilaudid [hydromorphone] Anaphylaxis     Other reaction(s): Anaphylaxis  Other reaction(s): Unknown       No current facility-administered medications on file prior to encounter.      Current Outpatient Prescriptions on File Prior to Encounter   Medication Sig    albuterol (ACCUNEB) 0.63 mg/3 mL Nebu INHALE THE CONTENTS OF 1 VIAL VIA NEBULIZER EVERY 6 HOURS AS NEEDED    albuterol-ipratropium 2.5mg-0.5mg/3mL (DUO-NEB) 0.5 mg-3 mg(2.5 mg base)/3 mL nebulizer solution INHALE THE CONTENTS OF 1 VIAL VIA NEBULIZER EVERY 6 HOURS AS NEEDED FOR  WHEEZING (DO NOT USE WITH ALBUTEROL INHALER)    ANORO ELLIPTA 62.5-25 mcg/actuation DsDv INHALE 1 PUFF INTO THE LUNGS ONCE DAILY AT 6AM. CONTROLLER    ascorbic acid, vitamin C, (VITAMIN C) 500 MG tablet Take 1 tablet (500  mg total) by mouth every evening.    atorvastatin (LIPITOR) 20 MG tablet TAKE 1 TABLET (20 MG TOTAL) BY MOUTH ONCE DAILY.    buPROPion (WELLBUTRIN) 75 MG tablet Take 1 tablet (75 mg total) by mouth 2 (two) times daily.    cephALEXin (KEFLEX) 500 MG capsule TAKE 1 CAPSULE (500 MG TOTAL) BY MOUTH ONCE DAILY AT 6:00A.M.    clonazePAM (KLONOPIN) 1 MG tablet Take 1 tablet (1 mg total) by mouth 2 (two) times daily as needed.    clonazePAM (KLONOPIN) 1 MG tablet TAKE ONE TABLET BY MOUTH TWICE DAILY AS NEEDED FOR ANXIETY    duloxetine (CYMBALTA) 60 MG capsule Take 1 capsule (60 mg total) by mouth once daily.    furosemide (LASIX) 40 MG tablet TAKE ONE TABLET ONCE DAILY FOR FLUID OVERLOAD    gabapentin (NEURONTIN) 800 MG tablet Take 1 tablet (800 mg total) by mouth 3 (three) times daily.    hydrocodone-acetaminophen 10-325mg (NORCO)  mg Tab Take 1 tablet by mouth every 6 (six) hours as needed for Pain.    hydrocodone-acetaminophen 10-325mg (NORCO)  mg Tab Take 1 tablet by mouth every 6 (six) hours as needed for Pain.    [START ON 10/19/2017] hydrocodone-acetaminophen 10-325mg (NORCO)  mg Tab Take 1 tablet by mouth every 6 (six) hours as needed for Pain.    levalbuterol (XOPENEX) 0.63 mg/3 mL nebulizer solution INHALE THE CONTENTS OF 1 VIAL BY NEBULIZATION 3 (THREE) TIMES DAILY.    lisinopril (PRINIVIL,ZESTRIL) 20 MG tablet TAKE 1 TABLET EVERY DAY    magnesium oxide (MAG-OX) 400 mg tablet Take 1 tablet (400 mg total) by mouth 2 (two) times daily.    metformin (GLUCOPHAGE) 500 MG tablet TAKE 1 TABLET TWICE DAILY WITH MEALS    methocarbamol (ROBAXIN) 750 MG Tab TAKE 1 TABLET FOUR TIMES DAILY    metoprolol succinate (TOPROL-XL) 25 MG 24 hr tablet TAKE 1/2 TABLET EVERY DAY    multivitamin (THERAGRAN) tablet Take 1 tablet by mouth once daily.    nicotine (NICODERM CQ) 21 mg/24 hr Place 1 patch onto the skin once daily.    nystatin (MYCOSTATIN) powder Apply topically 4 (four) times daily.     nystatin-triamcinolone (MYCOLOG II) cream APPLY SPARINGLY TO AFFECTED AREA 3 TIMES A DAY AS NEEDED    polyethylene glycol (GLYCOLAX) 17 gram/dose powder MIX 1 CAPFUL (17GM) IN LIQUID AND DRINK BEFORE BREAKFAST    potassium chloride SA (K-DUR,KLOR-CON) 20 MEQ tablet Take 1 tablet (20 mEq total) by mouth once daily.    temazepam (RESTORIL) 15 mg Cap Take 1 capsule (15 mg total) by mouth nightly as needed.    trazodone (DESYREL) 150 MG tablet Take 0.5 tablets (75 mg total) by mouth every evening.    TRUE METRIX GLUCOSE TEST STRIP Strp TEST BLOOD SUGAR THREE TIMES DAILY    VENTOLIN HFA 90 mcg/actuation inhaler INHALE TWO PUFFS EVERY 6 HOURS AS NEEDED FOR WHEEZING    warfarin (COUMADIN) 5 MG tablet Take 0.5 tablets (2.5 mg total) by mouth Daily.     Family History     Problem Relation (Age of Onset)    Alcohol abuse Mother    Arthritis Father, Sister    Blindness Son    Cancer Brother    Crohn's disease Sister    Early death Sister (30)    Heart disease Father, Sister (32), Sister    No Known Problems Brother, Daughter        Social History Main Topics    Smoking status: Current Every Day Smoker     Packs/day: 0.25     Years: 50.00     Types: Cigarettes     Start date: 1/19/1968    Smokeless tobacco: Former User     Quit date: 2/12/2017      Comment: 1 ppd for 20 years    Alcohol use No    Drug use: No    Sexual activity: Not Currently     Review of Systems   Constitutional: Negative for activity change, appetite change, diaphoresis and fever.   HENT: Negative for congestion and facial swelling.    Eyes: Negative for redness and itching.   Respiratory: Positive for shortness of breath. Negative for cough, chest tightness and wheezing.    Cardiovascular: Positive for chest pain. Negative for palpitations and leg swelling.   Gastrointestinal: Negative for abdominal pain, constipation, diarrhea, nausea and vomiting.   Endocrine: Negative for cold intolerance and heat intolerance.   Genitourinary: Negative for  difficulty urinating, dysuria, flank pain, frequency, hematuria and urgency.   Musculoskeletal: Negative for arthralgias, back pain, joint swelling, myalgias and neck pain.   Neurological: Negative for dizziness, syncope, weakness and headaches.   Psychiatric/Behavioral: Negative for agitation and confusion. The patient is nervous/anxious.      Objective:     Vital Signs (Most Recent):  Temp: 96.5 °F (35.8 °C) (09/18/17 0342)  Pulse: (!) 54 (09/18/17 0342)  Resp: 18 (09/18/17 0342)  BP: (!) 121/54 (09/18/17 0342)  SpO2: 97 % (09/18/17 0342) Vital Signs (24h Range):  Temp:  [96.5 °F (35.8 °C)-97.9 °F (36.6 °C)] 96.5 °F (35.8 °C)  Pulse:  [] 54  Resp:  [18-22] 18  SpO2:  [88 %-97 %] 97 %  BP: ()/(46-97) 121/54     Weight: 132 kg (291 lb)  Body mass index is 44.25 kg/m².    Physical Exam   Constitutional: She is oriented to person, place, and time. She appears well-developed and well-nourished. No distress.   Morbidly obese   HENT:   Head: Normocephalic and atraumatic.   Mouth/Throat: Oropharynx is clear and moist.   Eyes: Conjunctivae are normal. Pupils are equal, round, and reactive to light. Right eye exhibits no discharge. Left eye exhibits no discharge. No scleral icterus.   Neck: Normal range of motion. Neck supple. No JVD present. No thyromegaly present.   Cardiovascular: Normal rate.  An irregularly irregular rhythm present. Exam reveals no gallop and no friction rub.    No murmur heard.  Pulmonary/Chest: Effort normal and breath sounds normal. No respiratory distress. She has no wheezes. She has no rales. She exhibits no tenderness.   Abdominal: Soft. She exhibits no distension. There is no tenderness. There is no rebound and no guarding. A hernia is present.   Large ventral hernia extends outwards to left side     Musculoskeletal: Normal range of motion. She exhibits no edema or tenderness.   Lymphadenopathy:     She has no cervical adenopathy.   Neurological: She is alert and oriented to person,  place, and time. She displays normal reflexes. No cranial nerve deficit.   Skin: Skin is warm and dry. Capillary refill takes less than 2 seconds. She is not diaphoretic.   Psychiatric: She has a normal mood and affect. Her behavior is normal. Judgment and thought content normal.   Vitals reviewed.       Significant Labs:   CBC:   Recent Labs  Lab 09/17/17 1950   WBC 11.40   HGB 13.3   HCT 41.4        CMP:   Recent Labs  Lab 09/17/17 1950      K 3.8   CL 99   CO2 33*   *   BUN 17   CREATININE 0.8   CALCIUM 9.8   PROT 7.5   ALBUMIN 3.3*   BILITOT 0.3   ALKPHOS 101   AST 22   ALT 21   ANIONGAP 11   EGFRNONAA >60

## 2017-09-18 NOTE — PLAN OF CARE
Patient lives with her daughter, daughter's sig other and grandchild.  Denies HH; however is requesting a nurse with [facility].  Patient is also requesting that her INR be drawn while she is here because she is scheduled to have it done at the clinic tomorrow; informed that this  would inform [facility] of request.  D/c plan is home with family.       09/18/17 1057   Discharge Assessment   Assessment Type Discharge Planning Assessment   Confirmed/corrected address and phone number on facesheet? Yes   Assessment information obtained from? Patient   Prior to hospitilization cognitive status: Alert/Oriented   Prior to hospitalization functional status: Independent;Assistive Equipment   Current cognitive status: Alert/Oriented   Current Functional Status: Independent;Assistive Equipment   Lives With child(bonny), adult;other relative(s)   Able to Return to Prior Arrangements yes   Is patient able to care for self after discharge? Yes   Patient's perception of discharge disposition home or selfcare   Readmission Within The Last 30 Days no previous admission in last 30 days   Patient currently being followed by outpatient case management? No   Patient currently receives any other outside agency services? No   Equipment Currently Used at Home cane, straight;bedside commode;rollator;nebulizer;oxygen;power chair;other (see comments)  (pulse ox)   Do you have any problems affording any of your prescribed medications? No   Is the patient taking medications as prescribed? yes   Does the patient have transportation home? Yes   Transportation Available family or friend will provide   Does the patient receive services at [facility]? Yes   Discharge Plan A Home   Discharge Plan B Home Health   Patient/Family In Agreement With Plan yes

## 2017-09-18 NOTE — CONSULTS
[facility]  Cardiology  Consult Note    Patient Name: Nelly Tian  MRN: [id number]  Admission Date: 9/17/2017  Hospital Length of Stay: 0 days  Code Status: Full Code   Attending Provider: Dion Aguirre MD   Consulting Provider: Jessie Gallagher NP  Primary Care Physician: Constantine Bird MD  Principal Problem:Chest pain    Patient information was obtained from patient and medical record     Inpatient consult to Cardiology  Consult performed by: JESSIE GALLAGHER  Consult ordered by: CELINE PARR  Reason for consult: chest pain      This patient is known to me and my collaborating physician, Dr. Mack.   Subjective:     Chief Complaint:  Chest pain     HPI:   PCP: Dr. Bird   Cardiologist: Previously Dr. Torres, last seen 11/2015  Pain Management Specialist: Dr. Rosenbaum  Pulmonology: Dr. Tolbert, last seen 3 months ago     Patient lives with daughter Maye and her children. Maye is an outdoor smoker. Ex , Delbert (outdoor smoker) is an alcoholic (per patient's report) and recently moved back into the home.   Patient is a former  with the [employer].     Cardiology consultation for chest pain in a 65 y/o WF with a h/o HTN, chronic afib, long-term anticoagulant use, nonobstructive CAD, CHF, PVD, obesity, type 2 DM, AMY, COPD, home O2 use, tobacco dependency and depression. Presented to the ED yesterday with a c/o a 2 day history of intermittent, non-radiating left-sided CP.  Episodes of CP last a few seconds and occur with rest and activity. Admits to associated SOB. Patient stating that CP started to get better on the way to the ED but eventually resolved after 1 sublingual ntg was administered here.  She feels that her CP is r/t a recent increase in stress after her ex  who she claims is an alcoholic recently moved back into their home. Denies fever, diaphoresis, lightheadedness, syncope, leg swelling, palpitations, abdominal pain, n/v.     She reports  compliance with meds. Admits to approximately 50 pound weight loss in 4 months. She is still using her mobility scooter for ambulation assistance but states that she has been trying to walk more often and adds that she has been trying to make healthier eating choices.  She was seen by pulmonology in past and has missed some appointments for outpatient sleep apnea eval; she has still not followed up with pulmonology for this. She has a long history of smoking; quit for a few weeks back in 2017 then started smoking again after being discharged from an admission in 2017. She had a cardiology follow up appointment in 2017 which she says she missed because she had the flu.     Past Medical History:   Diagnosis Date    *Atrial flutter     Anxiety     Arthritis     Asthma     Atrial fibrillation     Back pain     Cataract     OD    CHF (congestive heart failure)     COPD (chronic obstructive pulmonary disease)     Depression     Diabetes mellitus     Emphysema of lung     Heart failure     Hernia     Hypercapnic respiratory failure, chronic 2016    Hyperlipidemia     Hypertension     Iron deficiency anemia 2/3/2016    Myocardial infarction     Obesity     Peripheral vascular disease     Pneumonia     Polyneuropathy     Retinal detachment     OS    Septic shock 2017    Skin ulcer 3/18/2017    Tobacco dependence     Type II 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, not stated as uncontrolled        Past Surgical History:   Procedure Laterality Date    BREAST BIOPSY Left 10 yrs ago    benign    CATARACT EXTRACTION Left     OS      SECTION      x2    EYE SURGERY      HYSTERECTOMY      OOPHORECTOMY      one ovary conserved    RETINAL DETACHMENT SURGERY      buckle --OS    TONSILLECTOMY         Review of patient's allergies indicates:   Allergen Reactions    Dilaudid [hydromorphone] Anaphylaxis     Other reaction(s): Anaphylaxis  Other  reaction(s): Unknown       No current facility-administered medications on file prior to encounter.      Current Outpatient Prescriptions on File Prior to Encounter   Medication Sig    albuterol (ACCUNEB) 0.63 mg/3 mL Nebu INHALE THE CONTENTS OF 1 VIAL VIA NEBULIZER EVERY 6 HOURS AS NEEDED    albuterol-ipratropium 2.5mg-0.5mg/3mL (DUO-NEB) 0.5 mg-3 mg(2.5 mg base)/3 mL nebulizer solution INHALE THE CONTENTS OF 1 VIAL VIA NEBULIZER EVERY 6 HOURS AS NEEDED FOR  WHEEZING (DO NOT USE WITH ALBUTEROL INHALER)    ANORO ELLIPTA 62.5-25 mcg/actuation DsDv INHALE 1 PUFF INTO THE LUNGS ONCE DAILY AT 6AM. CONTROLLER    ascorbic acid, vitamin C, (VITAMIN C) 500 MG tablet Take 1 tablet (500 mg total) by mouth every evening.    atorvastatin (LIPITOR) 20 MG tablet TAKE 1 TABLET (20 MG TOTAL) BY MOUTH ONCE DAILY.    buPROPion (WELLBUTRIN) 75 MG tablet Take 1 tablet (75 mg total) by mouth 2 (two) times daily.    cephALEXin (KEFLEX) 500 MG capsule TAKE 1 CAPSULE (500 MG TOTAL) BY MOUTH ONCE DAILY AT 6:00A.M.    clonazePAM (KLONOPIN) 1 MG tablet Take 1 tablet (1 mg total) by mouth 2 (two) times daily as needed.    clonazePAM (KLONOPIN) 1 MG tablet TAKE ONE TABLET BY MOUTH TWICE DAILY AS NEEDED FOR ANXIETY    duloxetine (CYMBALTA) 60 MG capsule Take 1 capsule (60 mg total) by mouth once daily.    furosemide (LASIX) 40 MG tablet TAKE ONE TABLET ONCE DAILY FOR FLUID OVERLOAD    gabapentin (NEURONTIN) 800 MG tablet Take 1 tablet (800 mg total) by mouth 3 (three) times daily.    hydrocodone-acetaminophen 10-325mg (NORCO)  mg Tab Take 1 tablet by mouth every 6 (six) hours as needed for Pain.    hydrocodone-acetaminophen 10-325mg (NORCO)  mg Tab Take 1 tablet by mouth every 6 (six) hours as needed for Pain.    [START ON 10/19/2017] hydrocodone-acetaminophen 10-325mg (NORCO)  mg Tab Take 1 tablet by mouth every 6 (six) hours as needed for Pain.    levalbuterol (XOPENEX) 0.63 mg/3 mL nebulizer solution INHALE  THE CONTENTS OF 1 VIAL BY NEBULIZATION 3 (THREE) TIMES DAILY.    lisinopril (PRINIVIL,ZESTRIL) 20 MG tablet TAKE 1 TABLET EVERY DAY    magnesium oxide (MAG-OX) 400 mg tablet Take 1 tablet (400 mg total) by mouth 2 (two) times daily.    metformin (GLUCOPHAGE) 500 MG tablet TAKE 1 TABLET TWICE DAILY WITH MEALS    methocarbamol (ROBAXIN) 750 MG Tab TAKE 1 TABLET FOUR TIMES DAILY    metoprolol succinate (TOPROL-XL) 25 MG 24 hr tablet TAKE 1/2 TABLET EVERY DAY    multivitamin (THERAGRAN) tablet Take 1 tablet by mouth once daily.    nicotine (NICODERM CQ) 21 mg/24 hr Place 1 patch onto the skin once daily.    nystatin (MYCOSTATIN) powder Apply topically 4 (four) times daily.    nystatin-triamcinolone (MYCOLOG II) cream APPLY SPARINGLY TO AFFECTED AREA 3 TIMES A DAY AS NEEDED    polyethylene glycol (GLYCOLAX) 17 gram/dose powder MIX 1 CAPFUL (17GM) IN LIQUID AND DRINK BEFORE BREAKFAST    potassium chloride SA (K-DUR,KLOR-CON) 20 MEQ tablet Take 1 tablet (20 mEq total) by mouth once daily.    temazepam (RESTORIL) 15 mg Cap Take 1 capsule (15 mg total) by mouth nightly as needed.    trazodone (DESYREL) 150 MG tablet Take 0.5 tablets (75 mg total) by mouth every evening.    TRUE METRIX GLUCOSE TEST STRIP Strp TEST BLOOD SUGAR THREE TIMES DAILY    VENTOLIN HFA 90 mcg/actuation inhaler INHALE TWO PUFFS EVERY 6 HOURS AS NEEDED FOR WHEEZING    warfarin (COUMADIN) 5 MG tablet Take 0.5 tablets (2.5 mg total) by mouth Daily.     Family History     Problem Relation (Age of Onset)    Alcohol abuse Mother    Arthritis Father, Sister    Blindness Son    Cancer Brother    Crohn's disease Sister    Early death Sister (30)    Heart disease Father, Sister (32), Sister    No Known Problems Brother, Daughter        Social History Main Topics    Smoking status: Current Every Day Smoker     Packs/day: 0.25     Years: 50.00     Types: Cigarettes     Start date: 1/19/1968    Smokeless tobacco: Former User     Quit date:  2/12/2017      Comment: 1 ppd for 20 years    Alcohol use No    Drug use: No    Sexual activity: Not Currently     Review of Systems   Constitution: Positive for weight loss (approx 50 pounds in 4 months). Negative for diaphoresis, fever, malaise/fatigue and weight gain.   HENT: Negative for congestion and sore throat.    Eyes:        Denies acute vision changes   Cardiovascular: Negative for chest pain, leg swelling, orthopnea, palpitations and syncope.   Respiratory: Positive for shortness of breath. Negative for cough and hemoptysis.    Endocrine: Negative for cold intolerance, heat intolerance, polydipsia, polyphagia and polyuria.   Hematologic/Lymphatic: Bruises/bleeds easily.   Skin: Positive for rash (chronic right and left lower leg rash).   Musculoskeletal: Negative for arthritis, back pain and joint swelling.   Gastrointestinal: Negative for abdominal pain, constipation, diarrhea, hematochezia, melena, nausea and vomiting.   Genitourinary: Negative for dysuria and hematuria.        Denies decrease in urine volume   Neurological: Negative for aphonia, dizziness, focal weakness, headaches, light-headedness and sensory change.   Psychiatric/Behavioral: Negative for depression. The patient is nervous/anxious.         Recent increase in stress   Allergic/Immunologic: Negative for persistent infections.     Objective:     Vital Signs (Most Recent):  Temp: 97.4 °F (36.3 °C) (09/18/17 0754)  Pulse: 75 (09/18/17 0754)  Resp: 18 (09/18/17 0754)  BP: 116/60 (09/18/17 0754)  SpO2: 96 % (09/18/17 0754) Vital Signs (24h Range):  Temp:  [96.5 °F (35.8 °C)-97.9 °F (36.6 °C)] 97.4 °F (36.3 °C)  Pulse:  [] 75  Resp:  [18-22] 18  SpO2:  [86 %-97 %] 96 %  BP: ()/(46-97) 116/60     Weight: 132 kg (291 lb)  Body mass index is 44.25 kg/m².    SpO2: 96 %  O2 Device (Oxygen Therapy): nasal cannula      Intake/Output Summary (Last 24 hours) at 09/18/17 0918  Last data filed at 09/18/17 0600   Gross per 24 hour    Intake              840 ml   Output                0 ml   Net              840 ml       Lines/Drains/Airways     Pressure Ulcer                 Pressure Ulcer 03/20/17 0829 Stage  days          Peripheral Intravenous Line                 Peripheral IV - Single Lumen 09/17/17 1950 Right Wrist less than 1 day                Physical Exam   Constitutional: She is oriented to person, place, and time. She appears well-developed and well-nourished. No distress.   HENT:   Head: Normocephalic and atraumatic.   Eyes: Conjunctivae and EOM are normal. Right eye exhibits no discharge. Left eye exhibits no discharge. No scleral icterus.   Neck: Normal range of motion. Neck supple. Carotid bruit is not present.   JVD assessment limited by body habitus   Cardiovascular: Normal rate and intact distal pulses.  An irregularly irregular rhythm present.   No murmur heard.  Pulses:       Radial pulses are 2+ on the right side, and 2+ on the left side.        Dorsalis pedis pulses are 1+ on the right side, and 2+ on the left side.   Distant heart tones   Pulmonary/Chest: Effort normal. No respiratory distress. She has no wheezes. She has rales (slight rales left base).   Abdominal: Soft. Bowel sounds are normal. There is no tenderness.   Musculoskeletal:   Trace pitting edema right and left lower legs   Neurological: She is alert and oriented to person, place, and time.   Follows commands, moves all extremities.   Skin: Skin is warm and dry. She is not diaphoretic. No cyanosis. Nails show no clubbing.   Psychiatric: She has a normal mood and affect. Her behavior is normal.   Nursing note and vitals reviewed.      Significant Labs:   CMP   Recent Labs  Lab 09/17/17 1950      K 3.8   CL 99   CO2 33*   *   BUN 17   CREATININE 0.8   CALCIUM 9.8   PROT 7.5   ALBUMIN 3.3*   BILITOT 0.3   ALKPHOS 101   AST 22   ALT 21   ANIONGAP 11   ESTGFRAFRICA >60   EGFRNONAA >60   , CBC   Recent Labs  Lab 09/17/17 1950   WBC 11.40    HGB 13.3   HCT 41.4      , INR   Recent Labs  Lab 09/17/17  1950 09/18/17  0527   INR 2.3* 2.6*   , Lipid Panel No results for input(s): CHOL, HDL, LDLCALC, TRIG, CHOLHDL in the last 48 hours. and Troponin   Recent Labs  Lab 09/17/17  1950 09/17/17  2256 09/18/17  0527   TROPONINI <0.006 <0.006 <0.006       Significant Imaging:   CXR 09/17/20217:  Findings: There has been removal of a right-sided central line.  The cardiomediastinal silhouette is normal limits.  There is calcification of the aortic arch.  Mild atelectasis is present at the left lung base.  No consolidation, pleural effusion or pneumothorax.  Impression:   Left basilar atelectasis.    Prior Echo 02/2017:  CONCLUSIONS     1 - Enlarged left ventricular enlargement.     2 - Eccentric hypertrophy.     3 - Low normal to mildly depressed left ventricular systolic function (EF 50-55%).     4 - Right ventricular enlargement with not well seen systolic function.     5 - The estimated PA systolic pressure is 39 mmHg.     6 - Very difficult windows, Optison contrast used for wall motion analysis.     7 - Suggestion for reduced LV function from Echo in 11/2016.     Prior Echo 11/2016:  CONCLUSIONS     1 - Mild left ventricular enlargement.     2 - Concentric hypertrophy.     3 - Normal left ventricular systolic function (EF 55-60%).     4 - Right ventricular enlargement with not well seen systolic function.     5 - The estimated PA systolic pressure is 31 mmHg.     6 - Intermediate central venous pressure.     7 - Difficult windows, Optison contrast used for wall motion analysis.     8 - No significant change from Echo in 11/2014.      Prior C 11/2014:  B. HEMODYNAMIC RESULTS:  LVEDP (Pre): 16 mmHg  LVEDP (Post): 16 mmHg  Ejection Fraction: 55%    C. ANGIOGRAPHIC RESULTS:  DIAGNOSTIC:       Patient has a right dominant coronary artery.        - Left Main Coronary Artery:             The LM is normal. There is BRIGID 3 flow.       - Left Anterior  "Descending Artery:             The LAD has luminal irregularities. There is BRIGID 3 flow.       - Left Circumflex Artery:             The LCX has luminal irregularities. There is BRIGID 3 flow.       - Right Coronary Artery:             The RCA has luminal irregularities. There is BRIGID 3 flow. Small non-dominant artery.    D. SUMMARY:  1. Non-obstructive CAD.  2. Normal LVEF.    E. RECOMMENDATIONS:  1. Reassurance.  2. Risk factor reductions.  3. ASA 81mg.  4. Weight loss.  5. Follow up with Dr. Natan Torres.    Assessment and Plan:     Active Hospital Problems    Diagnosis    *Chest pain    Obesity, Class III, BMI 40-49.9 (morbid obesity)    Hypercapnic respiratory failure, chronic    Hypertension associated with diabetes    Tobacco dependency       Patient with h/o nonobstructive CAD as per [facility] done in 11/2014. She is morbidly obese with sedentary lifestyle. She has experienced issues with medical treatment noncompliance in the past. She continues to smoke. CP with atypical features. No evidence of acute ischemic changes on EKG. No elevation in serial troponin x 3. LDL of 38.6 and non-HLD of 84 back in 05/2017, on Lipitor 20mg.       - further inpatient ischemic eval with stress test discussed with patient.  Given her h/o atrial fibrillation, will avoid DSE. She declined nuclear stress test, citing prior severe side effects. Stated that she felt like her "head was going to explode" with prior nuclear stress test. Given patient's history, may be reasonable to plan for outpatient angiogram. Patient counseled on importance of compliance with medications, diet, continued weight loss, increased activity, heart healthy diet, smoking cessation, and pulmonology f/u care for sleep apnea eval.  Patient expressed willingness to be compliant.   - f/u cardiology clinic in 2 weeks for re-evaluation. Can discuss setting up outpatient angiogram at that visit.   - f/u closely with pulmonology after discharge  - continue statin, " warfarin, BB, ACEI  - cardiac diet       No further recommendations at this time from a cardiology standpoint. Thank you for this consultation and allowing us to participate in Mrs. Tian care. Please call prn for any questions. This patient has been discussed with and evaluated by my collaborating physician, Dr. Mack.  Please see Dr. Mack's MD attestation above for additional information/recommendations.       Jessie Gallagher NP  Cardiology   [facility]

## 2017-09-18 NOTE — PROGRESS NOTES
SS[facility] met with patient at bedside regarding home health.  Patient signed patient choice disclosure choosing [facility].  SSC sent patient information to [facility] through [facility] system for processing and acceptance.BERNICE rowell    1230pm The referral for the patient in [facility] OBS, room 203, bed 203 A admitted at 9/17/2017 7:29 PM has been updated by [email or web address].  Update: Accepted.

## 2017-09-18 NOTE — HPI
"Pt is a 65 yo female  admitted to the services of [facility] medicine via the ER for c/o chest pain. Pt states she has had left sided chest pain off and on through the weekend. On Saturday, had attributed it to eating red beans and rice. On Sunday while at rest, had acute onset of chest pain, radiation to left shoulder, associated with an increase in SOB and nausea. Pain continued to "come and go" did achieve relief after arrival here and receiving NTG. LHC by Dr. Torres in 2014 which was negative. Hx of chronic atrial fib of ~20 years, on coumadin. Hx of COPD and on home 02. Continues to smoke. Hx of DM, BS typically run 120-150, recently above 200 but pt not sure if glucometer working correctly since dropping it.  Currently, chest pain free. Pt attributes the chest pain to emotional stress. Has large abdominal hernia X 6 years, recent skin infection, continues to take Keflex daily.   "

## 2017-09-18 NOTE — SUBJECTIVE & OBJECTIVE
Past Medical History:   Diagnosis Date    *Atrial flutter     Anxiety     Arthritis     Asthma     Atrial fibrillation     Back pain     Cataract     OD    CHF (congestive heart failure)     COPD (chronic obstructive pulmonary disease)     Depression     Diabetes mellitus     Emphysema of lung     Heart failure     Hernia     Hypercapnic respiratory failure, chronic 2016    Hyperlipidemia     Hypertension     Iron deficiency anemia 2/3/2016    Myocardial infarction     Obesity     Peripheral vascular disease     Pneumonia     Polyneuropathy     Retinal detachment     OS    Septic shock 2017    Skin ulcer 3/18/2017    Tobacco dependence     Type II 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, not stated as uncontrolled        Past Surgical History:   Procedure Laterality Date    BREAST BIOPSY Left 10 yrs ago    benign    CATARACT EXTRACTION Left     OS      SECTION      x2    EYE SURGERY      HYSTERECTOMY      OOPHORECTOMY      one ovary conserved    RETINAL DETACHMENT SURGERY      buckle --OS    TONSILLECTOMY         Review of patient's allergies indicates:   Allergen Reactions    Dilaudid [hydromorphone] Anaphylaxis     Other reaction(s): Anaphylaxis  Other reaction(s): Unknown       No current facility-administered medications on file prior to encounter.      Current Outpatient Prescriptions on File Prior to Encounter   Medication Sig    albuterol (ACCUNEB) 0.63 mg/3 mL Nebu INHALE THE CONTENTS OF 1 VIAL VIA NEBULIZER EVERY 6 HOURS AS NEEDED    albuterol-ipratropium 2.5mg-0.5mg/3mL (DUO-NEB) 0.5 mg-3 mg(2.5 mg base)/3 mL nebulizer solution INHALE THE CONTENTS OF 1 VIAL VIA NEBULIZER EVERY 6 HOURS AS NEEDED FOR  WHEEZING (DO NOT USE WITH ALBUTEROL INHALER)    ANORO ELLIPTA 62.5-25 mcg/actuation DsDv INHALE 1 PUFF INTO THE LUNGS ONCE DAILY AT 6AM. CONTROLLER    ascorbic acid, vitamin C, (VITAMIN C) 500 MG tablet Take 1 tablet (500  mg total) by mouth every evening.    atorvastatin (LIPITOR) 20 MG tablet TAKE 1 TABLET (20 MG TOTAL) BY MOUTH ONCE DAILY.    buPROPion (WELLBUTRIN) 75 MG tablet Take 1 tablet (75 mg total) by mouth 2 (two) times daily.    cephALEXin (KEFLEX) 500 MG capsule TAKE 1 CAPSULE (500 MG TOTAL) BY MOUTH ONCE DAILY AT 6:00A.M.    clonazePAM (KLONOPIN) 1 MG tablet Take 1 tablet (1 mg total) by mouth 2 (two) times daily as needed.    clonazePAM (KLONOPIN) 1 MG tablet TAKE ONE TABLET BY MOUTH TWICE DAILY AS NEEDED FOR ANXIETY    duloxetine (CYMBALTA) 60 MG capsule Take 1 capsule (60 mg total) by mouth once daily.    furosemide (LASIX) 40 MG tablet TAKE ONE TABLET ONCE DAILY FOR FLUID OVERLOAD    gabapentin (NEURONTIN) 800 MG tablet Take 1 tablet (800 mg total) by mouth 3 (three) times daily.    hydrocodone-acetaminophen 10-325mg (NORCO)  mg Tab Take 1 tablet by mouth every 6 (six) hours as needed for Pain.    hydrocodone-acetaminophen 10-325mg (NORCO)  mg Tab Take 1 tablet by mouth every 6 (six) hours as needed for Pain.    [START ON 10/19/2017] hydrocodone-acetaminophen 10-325mg (NORCO)  mg Tab Take 1 tablet by mouth every 6 (six) hours as needed for Pain.    levalbuterol (XOPENEX) 0.63 mg/3 mL nebulizer solution INHALE THE CONTENTS OF 1 VIAL BY NEBULIZATION 3 (THREE) TIMES DAILY.    lisinopril (PRINIVIL,ZESTRIL) 20 MG tablet TAKE 1 TABLET EVERY DAY    magnesium oxide (MAG-OX) 400 mg tablet Take 1 tablet (400 mg total) by mouth 2 (two) times daily.    metformin (GLUCOPHAGE) 500 MG tablet TAKE 1 TABLET TWICE DAILY WITH MEALS    methocarbamol (ROBAXIN) 750 MG Tab TAKE 1 TABLET FOUR TIMES DAILY    metoprolol succinate (TOPROL-XL) 25 MG 24 hr tablet TAKE 1/2 TABLET EVERY DAY    multivitamin (THERAGRAN) tablet Take 1 tablet by mouth once daily.    nicotine (NICODERM CQ) 21 mg/24 hr Place 1 patch onto the skin once daily.    nystatin (MYCOSTATIN) powder Apply topically 4 (four) times daily.     nystatin-triamcinolone (MYCOLOG II) cream APPLY SPARINGLY TO AFFECTED AREA 3 TIMES A DAY AS NEEDED    polyethylene glycol (GLYCOLAX) 17 gram/dose powder MIX 1 CAPFUL (17GM) IN LIQUID AND DRINK BEFORE BREAKFAST    potassium chloride SA (K-DUR,KLOR-CON) 20 MEQ tablet Take 1 tablet (20 mEq total) by mouth once daily.    temazepam (RESTORIL) 15 mg Cap Take 1 capsule (15 mg total) by mouth nightly as needed.    trazodone (DESYREL) 150 MG tablet Take 0.5 tablets (75 mg total) by mouth every evening.    TRUE METRIX GLUCOSE TEST STRIP Strp TEST BLOOD SUGAR THREE TIMES DAILY    VENTOLIN HFA 90 mcg/actuation inhaler INHALE TWO PUFFS EVERY 6 HOURS AS NEEDED FOR WHEEZING    warfarin (COUMADIN) 5 MG tablet Take 0.5 tablets (2.5 mg total) by mouth Daily.     Family History     Problem Relation (Age of Onset)    Alcohol abuse Mother    Arthritis Father, Sister    Blindness Son    Cancer Brother    Crohn's disease Sister    Early death Sister (30)    Heart disease Father, Sister (32), Sister    No Known Problems Brother, Daughter        Social History Main Topics    Smoking status: Current Every Day Smoker     Packs/day: 0.25     Years: 50.00     Types: Cigarettes     Start date: 1/19/1968    Smokeless tobacco: Former User     Quit date: 2/12/2017      Comment: 1 ppd for 20 years    Alcohol use No    Drug use: No    Sexual activity: Not Currently     Review of Systems   Constitution: Positive for weight loss (approx 50 pounds in 4 months). Negative for diaphoresis, fever, malaise/fatigue and weight gain.   HENT: Negative for congestion and sore throat.    Eyes:        Denies acute vision changes   Cardiovascular: Negative for chest pain, leg swelling, orthopnea, palpitations and syncope.   Respiratory: Positive for shortness of breath. Negative for cough and hemoptysis.    Endocrine: Negative for cold intolerance, heat intolerance, polydipsia, polyphagia and polyuria.   Hematologic/Lymphatic: Bruises/bleeds easily.    Skin: Positive for rash (chronic right and left lower leg rash).   Musculoskeletal: Negative for arthritis, back pain and joint swelling.   Gastrointestinal: Negative for abdominal pain, constipation, diarrhea, hematochezia, melena, nausea and vomiting.   Genitourinary: Negative for dysuria and hematuria.        Denies decrease in urine volume   Neurological: Negative for aphonia, dizziness, focal weakness, headaches, light-headedness and sensory change.   Psychiatric/Behavioral: Negative for depression. The patient is nervous/anxious.         Recent increase in stress   Allergic/Immunologic: Negative for persistent infections.     Objective:     Vital Signs (Most Recent):  Temp: 97.4 °F (36.3 °C) (09/18/17 0754)  Pulse: 75 (09/18/17 0754)  Resp: 18 (09/18/17 0754)  BP: 116/60 (09/18/17 0754)  SpO2: 96 % (09/18/17 0754) Vital Signs (24h Range):  Temp:  [96.5 °F (35.8 °C)-97.9 °F (36.6 °C)] 97.4 °F (36.3 °C)  Pulse:  [] 75  Resp:  [18-22] 18  SpO2:  [86 %-97 %] 96 %  BP: ()/(46-97) 116/60     Weight: 132 kg (291 lb)  Body mass index is 44.25 kg/m².    SpO2: 96 %  O2 Device (Oxygen Therapy): nasal cannula      Intake/Output Summary (Last 24 hours) at 09/18/17 0918  Last data filed at 09/18/17 0600   Gross per 24 hour   Intake              840 ml   Output                0 ml   Net              840 ml       Lines/Drains/Airways     Pressure Ulcer                 Pressure Ulcer 03/20/17 0829 Stage  days          Peripheral Intravenous Line                 Peripheral IV - Single Lumen 09/17/17 1950 Right Wrist less than 1 day                Physical Exam   Constitutional: She is oriented to person, place, and time. She appears well-developed and well-nourished. No distress.   HENT:   Head: Normocephalic and atraumatic.   Eyes: Conjunctivae and EOM are normal. Right eye exhibits no discharge. Left eye exhibits no discharge. No scleral icterus.   Neck: Normal range of motion. Neck supple. Carotid bruit  is not present.   JVD assessment limited by body habitus   Cardiovascular: Normal rate and intact distal pulses.  An irregularly irregular rhythm present.   No murmur heard.  Pulses:       Radial pulses are 2+ on the right side, and 2+ on the left side.        Dorsalis pedis pulses are 1+ on the right side, and 2+ on the left side.   Distant heart tones   Pulmonary/Chest: Effort normal. No respiratory distress. She has no wheezes. She has rales (slight rales left base).   Abdominal: Soft. Bowel sounds are normal. There is no tenderness.   Musculoskeletal:   Trace pitting edema right and left lower legs   Neurological: She is alert and oriented to person, place, and time.   Follows commands, moves all extremities.   Skin: Skin is warm and dry. She is not diaphoretic. No cyanosis. Nails show no clubbing.   Psychiatric: She has a normal mood and affect. Her behavior is normal.   Nursing note and vitals reviewed.      Significant Labs:   CMP   Recent Labs  Lab 09/17/17 1950      K 3.8   CL 99   CO2 33*   *   BUN 17   CREATININE 0.8   CALCIUM 9.8   PROT 7.5   ALBUMIN 3.3*   BILITOT 0.3   ALKPHOS 101   AST 22   ALT 21   ANIONGAP 11   ESTGFRAFRICA >60   EGFRNONAA >60   , CBC   Recent Labs  Lab 09/17/17 1950   WBC 11.40   HGB 13.3   HCT 41.4      , INR   Recent Labs  Lab 09/17/17 1950 09/18/17  0527   INR 2.3* 2.6*   , Lipid Panel No results for input(s): CHOL, HDL, LDLCALC, TRIG, CHOLHDL in the last 48 hours. and Troponin   Recent Labs  Lab 09/17/17 1950 09/17/17  2256 09/18/17  0527   TROPONINI <0.006 <0.006 <0.006       Significant Imaging:   CXR 09/17/20217:  Findings: There has been removal of a right-sided central line.  The cardiomediastinal silhouette is normal limits.  There is calcification of the aortic arch.  Mild atelectasis is present at the left lung base.  No consolidation, pleural effusion or pneumothorax.  Impression:   Left basilar atelectasis.    Prior Echo 02/2017:  CONCLUSIONS      1 - Enlarged left ventricular enlargement.     2 - Eccentric hypertrophy.     3 - Low normal to mildly depressed left ventricular systolic function (EF 50-55%).     4 - Right ventricular enlargement with not well seen systolic function.     5 - The estimated PA systolic pressure is 39 mmHg.     6 - Very difficult windows, Optison contrast used for wall motion analysis.     7 - Suggestion for reduced LV function from Echo in 11/2016.     Prior Echo 11/2016:  CONCLUSIONS     1 - Mild left ventricular enlargement.     2 - Concentric hypertrophy.     3 - Normal left ventricular systolic function (EF 55-60%).     4 - Right ventricular enlargement with not well seen systolic function.     5 - The estimated PA systolic pressure is 31 mmHg.     6 - Intermediate central venous pressure.     7 - Difficult windows, Optison contrast used for wall motion analysis.     8 - No significant change from Echo in 11/2014.      Prior C 11/2014:  B. HEMODYNAMIC RESULTS:  LVEDP (Pre): 16 mmHg  LVEDP (Post): 16 mmHg  Ejection Fraction: 55%    C. ANGIOGRAPHIC RESULTS:  DIAGNOSTIC:       Patient has a right dominant coronary artery.        - Left Main Coronary Artery:             The LM is normal. There is BRIGID 3 flow.       - Left Anterior Descending Artery:             The LAD has luminal irregularities. There is BRIGID 3 flow.       - Left Circumflex Artery:             The LCX has luminal irregularities. There is BRIGID 3 flow.       - Right Coronary Artery:             The RCA has luminal irregularities. There is BRIGID 3 flow. Small non-dominant artery.    D. SUMMARY:  1. Non-obstructive CAD.  2. Normal LVEF.    E. RECOMMENDATIONS:  1. Reassurance.  2. Risk factor reductions.  3. ASA 81mg.  4. Weight loss.  5. Follow up with Dr. Natan Torres.

## 2017-09-18 NOTE — HPI
PCP: Dr. Bird   Cardiologist: Previously Dr. Torres, last seen 11/2015  Pain Management Specialist: Dr. Rosenbaum  Pulmonology: Dr. Tolbert, last seen 3 months ago     Patient lives with daughter Maye and her children. Maye is an outdoor smoker. Ex , Delbert (outdoor smoker) is an alcoholic (per patient's report) and recently moved back into the home.   Patient is a former  with the [employer].     Cardiology consultation for chest pain in a 65 y/o WF with a h/o HTN, chronic afib, long-term anticoagulant use, nonobstructive CAD, CHF, PVD, obesity, type 2 DM, AMY, COPD, home O2 use, tobacco dependency and depression.  Presented to the ED last night with a c/o left sided CP.  Admits to associated SOB.   Resolved after 1 sublingual ntg.       Afib with controlled rate on Torpol 12.5mg po daily, anticoagulated on warfarin, INR 2.6.   No elevation in serial troponin x 3. EKG showing atrial fibrillation, rate of 94 bpm, left posterior fascicular block. Prior LHC done in 11/2014 showing non-obstructive CAD. Presented to the ED with a c/o a 2 day history of intermittent, non-radiating left-sided CP.  Episodes of CP last a few seconds and occur with rest and activity. Admits to associated SOB. Patient stating that CP started to get better on the way to the ED but eventually resolved after 1 sublingual ntg was administered here.  She feels that her CP is r/t a recent increase in stress after her alcoholic ex  recently moved back into their home.     She reports compliance with meds. Approximately 50 pound weight loss in 4 months. She is still using her mobility scooter for ambulation assistance but states that she has been trying to walk more often and adds that she has been trying to make healthier eating choices.  She was seen by pulmonology in past and has missed some appointment for outpatient sleep apnea eval; patient has still not followed up with pulmonology for this. She has a long  history of smoking; quit for a few weeks back in 02/2017 then started smoking again after being discharged from an admission in 02/2017.

## 2017-09-18 NOTE — NURSING
Alert and oriented. Denies c/o severe pain. Discussed plan of care. Left abdomen dressing to be changed. Patient verbalized understanding.

## 2017-09-18 NOTE — ED PROVIDER NOTES
"Encounter Date: 9/17/2017    SCRIBE #1 NOTE: I, Adrienne Barrera , am scribing for, and in the presence of,  Dr. Marie . I have scribed the entire note.       History     Chief Complaint   Patient presents with    Chest Pain     since yesterday         09/17/2017  7:46 PM     Chief Complaint: CP      The patient is a 66 y.o. female who is presenting with the gradual onset of CP that began x 1 day ago. The pt reports that the CP is localized to the mid and L side of her chest, "heavy" feeling, and constant. She denies any exacerbating or mitigating factors. She now endorses associated L sided arm pain that travels up the arm into her jaw as well as L sided back pain. Pertinent PMHx includes Atrial flutter; Anxiety; Arthritis; Asthma; Atrial fibrillation; Back pain; Cataract; CHF (congestive heart failure); COPD (chronic obstructive pulmonary disease); Depression; Diabetes mellitus; Emphysema of lung; Heart failure; Hernia; Hypercapnic respiratory failure, chronic (11/16/2016); Hyperlipidemia; Hypertension; Iron deficiency anemia (2/3/2016); Myocardial infarction; Obesity; Peripheral vascular disease; Pneumonia; Polyneuropathy; Retinal detachment; Septic shock (4/23/2017); Skin ulcer (3/18/2017); Tobacco dependence. No pertinent past surgical hx. Pt is positive for family hx of MIs.               The history is provided by the patient, medical records and a relative.     Review of patient's allergies indicates:   Allergen Reactions    Dilaudid [hydromorphone] Anaphylaxis     Other reaction(s): Anaphylaxis  Other reaction(s): Unknown     Past Medical History:   Diagnosis Date    *Atrial flutter     Anxiety     Arthritis     Asthma     Atrial fibrillation     Back pain     Cataract     OD    CHF (congestive heart failure)     COPD (chronic obstructive pulmonary disease)     Depression     Diabetes mellitus     Emphysema of lung     Heart failure     Hernia     Hypercapnic respiratory failure, chronic " 2016    Hyperlipidemia     Hypertension     Iron deficiency anemia 2/3/2016    Myocardial infarction     Obesity     Peripheral vascular disease     Pneumonia     Polyneuropathy     Retinal detachment     OS    Septic shock 2017    Skin ulcer 3/18/2017    Tobacco dependence     Type II 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, not stated as uncontrolled      Past Surgical History:   Procedure Laterality Date    BREAST BIOPSY Left 10 yrs ago    benign    CATARACT EXTRACTION Left     OS      SECTION      x2    EYE SURGERY      HYSTERECTOMY      OOPHORECTOMY      one ovary conserved    RETINAL DETACHMENT SURGERY      buckle --OS    TONSILLECTOMY       Family History   Problem Relation Age of Onset    Arthritis Father     Heart disease Father     Early death Sister 30     heart disease    Heart disease Sister 32     MI    No Known Problems Brother     No Known Problems Daughter     Blindness Son      due to accident//    Arthritis Sister     Heart disease Sister     Crohn's disease Sister     Cancer Brother      asbestosis    Alcohol abuse Mother     Breast cancer Neg Hx     Glaucoma Neg Hx     Macular degeneration Neg Hx     Retinal detachment Neg Hx     Amblyopia Neg Hx     Diabetes Neg Hx     Cataracts Neg Hx     Hypertension Neg Hx     Strabismus Neg Hx     Stroke Neg Hx     Thyroid disease Neg Hx      Social History   Substance Use Topics    Smoking status: Current Every Day Smoker     Packs/day: 0.25     Years: 50.00     Types: Cigarettes     Start date: 1968    Smokeless tobacco: Former User     Quit date: 2017      Comment: 1 ppd for 20 years    Alcohol use No     Review of Systems   HENT:        Jaw pain   Cardiovascular: Positive for chest pain.   Musculoskeletal: Positive for back pain and myalgias.   All other systems reviewed and are negative.      Physical Exam     Initial Vitals [17 1931]   BP  Pulse Resp Temp SpO2   (!) 129/97 (!) 116 (!) 22 97.9 °F (36.6 °C) (!) 88 %      MAP       107.67         Physical Exam    Nursing note and vitals reviewed.  Constitutional: She appears well-developed and well-nourished. She is not diaphoretic.  Non-toxic appearance. She does not have a sickly appearance. She does not appear ill. She appears distressed.   Morbidly obese    HENT:   Head: Normocephalic and atraumatic.   Mouth/Throat: Oropharynx is clear and moist.   Eyes: EOM are normal.   Neck: Normal range of motion. Neck supple.   Cardiovascular: Normal heart sounds and intact distal pulses. Exam reveals no gallop and no friction rub.    No murmur heard.  Irregularly irregular    Pulmonary/Chest: Breath sounds normal. She has no wheezes. She has no rhonchi. She has no rales.   Abdominal: Soft. She exhibits no distension. There is no tenderness.   Musculoskeletal: Normal range of motion. She exhibits no edema or tenderness.   Legs are symmetric and non tenderness to palpation    Lymphadenopathy:     She has no cervical adenopathy.   Neurological: She is alert and oriented to person, place, and time. She has normal strength. No sensory deficit.   Skin: Skin is warm and dry. Capillary refill takes less than 2 seconds.   Psychiatric: She has a normal mood and affect.         ED Course   Procedures  Labs Reviewed   CBC W/ AUTO DIFFERENTIAL - Abnormal; Notable for the following:        Result Value    RDW 18.5 (*)     MPV 8.6 (*)     Gran # 8.1 (*)     Lymph% 16.3 (*)     All other components within normal limits   COMPREHENSIVE METABOLIC PANEL - Abnormal; Notable for the following:     CO2 33 (*)     Glucose 186 (*)     Albumin 3.3 (*)     All other components within normal limits   TROPONIN I   B-TYPE NATRIURETIC PEPTIDE          X-Rays:   Independently Interpreted Readings:   Chest X-Ray: No acute disease seen, no consolidation, atelectasis or PTX.       Medical Decision Making:   History:   Old Medical Records: I  decided to obtain old medical records.  Clinical Tests:   Lab Tests: Ordered and Reviewed  Radiological Study: Ordered and Reviewed  Medical Tests: Reviewed and Ordered            Scribe Attestation:   Scribe #1: I performed the above scribed service and the documentation accurately describes the services I performed. I attest to the accuracy of the note.    Attending Attestation:           Physician Attestation for Scribe:  Physician Attestation Statement for Scribe #1: I, Dr. Marie , reviewed documentation, as scribed by Adrienne Barrera  in my presence, and it is both accurate and complete.                 ED Course as of Sep 17 2031   Sun Sep 17, 2017   2010 Chest discomfort almost entirely relieved at this time after one nitroglycerin.  Systolic blood pressure 97 so a second nitroglycerin will be held.  [EF]      ED Course User Index  [EF] Suresh Marie MD     Clinical Impression:   The encounter diagnosis was Chest pain.        66-year-old with a history of atrial fibrillation CHF COPD depression diabetes hypertension presents with a day of left-sided chest pressure.  No pleuritic component no ripping or tearing symptoms.  Troponin is negative.  Chest discomfort resolved after one nitroglycerin.  Patient be admitted to hospital medicine for serial enzymes.  I do not suspect PE or DVT or aortic dissection.                 Suresh Marie MD  09/17/17 2042       Suresh Marie MD  09/17/17 2044

## 2017-09-18 NOTE — PLAN OF CARE
Problem: Patient Care Overview  Goal: Plan of Care Review  Outcome: Ongoing (interventions implemented as appropriate)  Alert/oriented  Pain controlled with medication  Out of bed with assistance/Bedside commode  Denies chest pain/discomfort  No s/s of ulcerations to skin. Protective dressings applied to panniculus. Area cleansed and dried. Mycostatin powder applied as ordered  Safe

## 2017-09-18 NOTE — PROGRESS NOTES
Thank you for the referral.   For your information:    The following patient has been assigned to Jayde Reyes RN with Outpatient Complex [facility] for high risk screening.    Reason:   Chest pain  Obesity, Class III, BMI 40-49.9 (morbid obesity)    Please contact [facility] at [phone] with any questions.    Thank you,  Marlyn Andrade

## 2017-09-18 NOTE — PLAN OF CARE
09/18/17 1103   Final Note   Assessment Type Final Discharge Note   Discharge Disposition Home-Health

## 2017-09-18 NOTE — HOSPITAL COURSE
Troponin trended and remained negative. Telemetry with no arrhythmias noted. Cardiology consulted. Patient cleared for Dc from cardiology standpoint. She is chest pain free since obs admission.     PE; chest CTA heart RRR. Abs: obese with large hernia.

## 2017-09-18 NOTE — H&P
"[facility] Hospital Medicine  History & Physical    Patient Name: Nelly Tian  MRN: [id number]  Admission Date: 9/17/2017  Attending Physician: Dion Aguirre MD   Primary Care Provider: Constantine Bird MD         Patient information was obtained from patient, past medical records and ER records.     Subjective:     Principal Problem:Chest pain    Chief Complaint:   Chief Complaint   Patient presents with    Chest Pain     since yesterday        HPI: Pt is a 67 yo female  admitted to the services of hospital medicine via the ER for c/o chest pain. Pt states she has had left sided chest pain off and on through the weekend. On Saturday, had attributed it to eating red beans and rice. On Sunday while at rest, had acute onset of chest pain, radiation to left shoulder, associated with an increase in SOB and nausea. Pain continued to "come and go" did achieve relief after arrival here and receiving NTG. LHC by Dr. Torres in 2014 which was negative. Hx of chronic atrial fib of ~20 years, on coumadin. Hx of COPD and on home 02. Continues to smoke. Hx of DM, BS typically run 120-150, recently above 200 but pt not sure if glucometer working correctly since dropping it.  Currently, chest pain free. Pt attributes the chest pain to emotional stress. Has large abdominal hernia X 6 years, recent skin infection, continues to take Keflex daily.     Past Medical History:   Diagnosis Date    *Atrial flutter     Anxiety     Arthritis     Asthma     Atrial fibrillation     Back pain     Cataract     OD    CHF (congestive heart failure)     COPD (chronic obstructive pulmonary disease)     Depression     Diabetes mellitus     Emphysema of lung     Heart failure     Hernia     Hypercapnic respiratory failure, chronic 11/16/2016    Hyperlipidemia     Hypertension     Iron deficiency anemia 2/3/2016    Myocardial infarction     Obesity     Peripheral vascular disease     Pneumonia     " Polyneuropathy     Retinal detachment     OS    Septic shock 2017    Skin ulcer 3/18/2017    Tobacco dependence     Type II 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, not stated as uncontrolled        Past Surgical History:   Procedure Laterality Date    BREAST BIOPSY Left 10 yrs ago    benign    CATARACT EXTRACTION Left     OS      SECTION      x2    EYE SURGERY      HYSTERECTOMY      OOPHORECTOMY      one ovary conserved    RETINAL DETACHMENT SURGERY      buckle --OS    TONSILLECTOMY         Review of patient's allergies indicates:   Allergen Reactions    Dilaudid [hydromorphone] Anaphylaxis     Other reaction(s): Anaphylaxis  Other reaction(s): Unknown       No current facility-administered medications on file prior to encounter.      Current Outpatient Prescriptions on File Prior to Encounter   Medication Sig    albuterol (ACCUNEB) 0.63 mg/3 mL Nebu INHALE THE CONTENTS OF 1 VIAL VIA NEBULIZER EVERY 6 HOURS AS NEEDED    albuterol-ipratropium 2.5mg-0.5mg/3mL (DUO-NEB) 0.5 mg-3 mg(2.5 mg base)/3 mL nebulizer solution INHALE THE CONTENTS OF 1 VIAL VIA NEBULIZER EVERY 6 HOURS AS NEEDED FOR  WHEEZING (DO NOT USE WITH ALBUTEROL INHALER)    ANORO ELLIPTA 62.5-25 mcg/actuation DsDv INHALE 1 PUFF INTO THE LUNGS ONCE DAILY AT 6AM. CONTROLLER    ascorbic acid, vitamin C, (VITAMIN C) 500 MG tablet Take 1 tablet (500 mg total) by mouth every evening.    atorvastatin (LIPITOR) 20 MG tablet TAKE 1 TABLET (20 MG TOTAL) BY MOUTH ONCE DAILY.    buPROPion (WELLBUTRIN) 75 MG tablet Take 1 tablet (75 mg total) by mouth 2 (two) times daily.    cephALEXin (KEFLEX) 500 MG capsule TAKE 1 CAPSULE (500 MG TOTAL) BY MOUTH ONCE DAILY AT 6:00A.M.    clonazePAM (KLONOPIN) 1 MG tablet Take 1 tablet (1 mg total) by mouth 2 (two) times daily as needed.    clonazePAM (KLONOPIN) 1 MG tablet TAKE ONE TABLET BY MOUTH TWICE DAILY AS NEEDED FOR ANXIETY    duloxetine (CYMBALTA) 60 MG  capsule Take 1 capsule (60 mg total) by mouth once daily.    furosemide (LASIX) 40 MG tablet TAKE ONE TABLET ONCE DAILY FOR FLUID OVERLOAD    gabapentin (NEURONTIN) 800 MG tablet Take 1 tablet (800 mg total) by mouth 3 (three) times daily.    hydrocodone-acetaminophen 10-325mg (NORCO)  mg Tab Take 1 tablet by mouth every 6 (six) hours as needed for Pain.    hydrocodone-acetaminophen 10-325mg (NORCO)  mg Tab Take 1 tablet by mouth every 6 (six) hours as needed for Pain.    [START ON 10/19/2017] hydrocodone-acetaminophen 10-325mg (NORCO)  mg Tab Take 1 tablet by mouth every 6 (six) hours as needed for Pain.    levalbuterol (XOPENEX) 0.63 mg/3 mL nebulizer solution INHALE THE CONTENTS OF 1 VIAL BY NEBULIZATION 3 (THREE) TIMES DAILY.    lisinopril (PRINIVIL,ZESTRIL) 20 MG tablet TAKE 1 TABLET EVERY DAY    magnesium oxide (MAG-OX) 400 mg tablet Take 1 tablet (400 mg total) by mouth 2 (two) times daily.    metformin (GLUCOPHAGE) 500 MG tablet TAKE 1 TABLET TWICE DAILY WITH MEALS    methocarbamol (ROBAXIN) 750 MG Tab TAKE 1 TABLET FOUR TIMES DAILY    metoprolol succinate (TOPROL-XL) 25 MG 24 hr tablet TAKE 1/2 TABLET EVERY DAY    multivitamin (THERAGRAN) tablet Take 1 tablet by mouth once daily.    nicotine (NICODERM CQ) 21 mg/24 hr Place 1 patch onto the skin once daily.    nystatin (MYCOSTATIN) powder Apply topically 4 (four) times daily.    nystatin-triamcinolone (MYCOLOG II) cream APPLY SPARINGLY TO AFFECTED AREA 3 TIMES A DAY AS NEEDED    polyethylene glycol (GLYCOLAX) 17 gram/dose powder MIX 1 CAPFUL (17GM) IN LIQUID AND DRINK BEFORE BREAKFAST    potassium chloride SA (K-DUR,KLOR-CON) 20 MEQ tablet Take 1 tablet (20 mEq total) by mouth once daily.    temazepam (RESTORIL) 15 mg Cap Take 1 capsule (15 mg total) by mouth nightly as needed.    trazodone (DESYREL) 150 MG tablet Take 0.5 tablets (75 mg total) by mouth every evening.    TRUE METRIX GLUCOSE TEST STRIP Strp TEST BLOOD  SUGAR THREE TIMES DAILY    VENTOLIN HFA 90 mcg/actuation inhaler INHALE TWO PUFFS EVERY 6 HOURS AS NEEDED FOR WHEEZING    warfarin (COUMADIN) 5 MG tablet Take 0.5 tablets (2.5 mg total) by mouth Daily.     Family History     Problem Relation (Age of Onset)    Alcohol abuse Mother    Arthritis Father, Sister    Blindness Son    Cancer Brother    Crohn's disease Sister    Early death Sister (30)    Heart disease Father, Sister (32), Sister    No Known Problems Brother, Daughter        Social History Main Topics    Smoking status: Current Every Day Smoker     Packs/day: 0.25     Years: 50.00     Types: Cigarettes     Start date: 1/19/1968    Smokeless tobacco: Former User     Quit date: 2/12/2017      Comment: 1 ppd for 20 years    Alcohol use No    Drug use: No    Sexual activity: Not Currently     Review of Systems   Constitutional: Negative for activity change, appetite change, diaphoresis and fever.   HENT: Negative for congestion and facial swelling.    Eyes: Negative for redness and itching.   Respiratory: Positive for shortness of breath. Negative for cough, chest tightness and wheezing.    Cardiovascular: Positive for chest pain. Negative for palpitations and leg swelling.   Gastrointestinal: Negative for abdominal pain, constipation, diarrhea, nausea and vomiting.   Endocrine: Negative for cold intolerance and heat intolerance.   Genitourinary: Negative for difficulty urinating, dysuria, flank pain, frequency, hematuria and urgency.   Musculoskeletal: Negative for arthralgias, back pain, joint swelling, myalgias and neck pain.   Neurological: Negative for dizziness, syncope, weakness and headaches.   Psychiatric/Behavioral: Negative for agitation and confusion. The patient is nervous/anxious.      Objective:     Vital Signs (Most Recent):  Temp: 96.5 °F (35.8 °C) (09/18/17 0342)  Pulse: (!) 54 (09/18/17 0342)  Resp: 18 (09/18/17 0342)  BP: (!) 121/54 (09/18/17 0342)  SpO2: 97 % (09/18/17 0342) Vital  Signs (24h Range):  Temp:  [96.5 °F (35.8 °C)-97.9 °F (36.6 °C)] 96.5 °F (35.8 °C)  Pulse:  [] 54  Resp:  [18-22] 18  SpO2:  [88 %-97 %] 97 %  BP: ()/(46-97) 121/54     Weight: 132 kg (291 lb)  Body mass index is 44.25 kg/m².    Physical Exam   Constitutional: She is oriented to person, place, and time. She appears well-developed and well-nourished. No distress.   Morbidly obese   HENT:   Head: Normocephalic and atraumatic.   Mouth/Throat: Oropharynx is clear and moist.   Eyes: Conjunctivae are normal. Pupils are equal, round, and reactive to light. Right eye exhibits no discharge. Left eye exhibits no discharge. No scleral icterus.   Neck: Normal range of motion. Neck supple. No JVD present. No thyromegaly present.   Cardiovascular: Normal rate.  An irregularly irregular rhythm present. Exam reveals no gallop and no friction rub.    No murmur heard.  Pulmonary/Chest: Effort normal and breath sounds normal. No respiratory distress. She has no wheezes. She has no rales. She exhibits no tenderness.   Abdominal: Soft. She exhibits no distension. There is no tenderness. There is no rebound and no guarding. A hernia is present.   Large ventral hernia extends outwards to left side     Musculoskeletal: Normal range of motion. She exhibits no edema or tenderness.   Lymphadenopathy:     She has no cervical adenopathy.   Neurological: She is alert and oriented to person, place, and time. She displays normal reflexes. No cranial nerve deficit.   Skin: Skin is warm and dry. Capillary refill takes less than 2 seconds. She is not diaphoretic.   Psychiatric: She has a normal mood and affect. Her behavior is normal. Judgment and thought content normal.   Vitals reviewed.       Significant Labs:   CBC:   Recent Labs  Lab 09/17/17 1950   WBC 11.40   HGB 13.3   HCT 41.4        CMP:   Recent Labs  Lab 09/17/17 1950      K 3.8   CL 99   CO2 33*   *   BUN 17   CREATININE 0.8   CALCIUM 9.8   PROT 7.5    ALBUMIN 3.3*   BILITOT 0.3   ALKPHOS 101   AST 22   ALT 21   ANIONGAP 11   EGFRNONAA >60           Assessment/Plan:     * Chest pain    Admit to observation  Trend troponin  ASA  Continue current medications  Consult cardiology          Obesity, Class III, BMI 40-49.9 (morbid obesity)      -chronic, complicates co-morbidities.         Hypercapnic respiratory failure, chronic    Stable  Continue home 02 at prescribed 2 L           Hypertension associated with diabetes    Chronic  Currently on the low side of normal  Continue current medications  Monitor and treat accordingly     DM controlled  Hold oral agents  Accuchecks with ISS          Tobacco dependency    Discussed smoking cessation  Nicotine patch ordered            VTE Risk Mitigation         Ordered     warfarin (COUMADIN) tablet 2.5 mg  Daily     Route:  Oral        09/17/17 2230     Medium Risk of VTE  Once      09/17/17 2134     Reason for No Pharmacological VTE Prophylaxis  Once      09/17/17 2134             Jewell Foster NP  Department of Hospital Medicine   [facility]

## 2017-09-18 NOTE — PLAN OF CARE
Problem: Patient Care Overview  Goal: Plan of Care Review  Outcome: Ongoing (interventions implemented as appropriate)  POC reviewed with patient.   Patient safety maintained, call light in reach, SR up x 2, non skid foot wear on when out of bed,  Patient reports she has been pain free since arrival to floor.  Cardiology in to talk with and evaluate patient.  Patient is to be discharged home.  Awaiting daughter to be 02 tank and transport home.  Will continue to monitor through discharge

## 2017-09-19 ENCOUNTER — ANTI-COAG VISIT (OUTPATIENT)
Dept: CARDIOLOGY | Facility: CLINIC | Age: 67
End: 2017-09-19

## 2017-09-19 ENCOUNTER — TELEPHONE (OUTPATIENT)
Dept: CARDIOLOGY | Facility: CLINIC | Age: 67
End: 2017-09-19

## 2017-09-19 ENCOUNTER — TELEPHONE (OUTPATIENT)
Dept: MEDSURG UNIT | Facility: HOSPITAL | Age: 67
End: 2017-09-19

## 2017-09-19 DIAGNOSIS — Z79.01 LONG TERM CURRENT USE OF ANTICOAGULANT THERAPY: ICD-10-CM

## 2017-09-19 LAB — INR PPP: 2.3 (ref 2–3)

## 2017-09-19 NOTE — TELEPHONE ENCOUNTER
Spoke with pt. Informed pt Dr. Mack is booked but I could get her in with Dr. Cook for her hospital follow up on October 3, 2017 at 1:00pm. Pt stated she would like the appointment with Dr. Cook. Also informed pt I would send her a letter reminder in the mail. No further issues discussed.

## 2017-09-20 ENCOUNTER — TELEPHONE (OUTPATIENT)
Dept: FAMILY MEDICINE | Facility: CLINIC | Age: 67
End: 2017-09-20

## 2017-09-20 NOTE — TELEPHONE ENCOUNTER
----- Message from Joaquina Zuleta sent at 9/20/2017 12:44 PM CDT -----  Contact: aure   Leaving to go out to town tomorrow, wants to cancel appt   Please advise   Call back

## 2017-09-20 NOTE — TELEPHONE ENCOUNTER
Spoke to patient who states she has an appointment on tomorrow with Dr. Bird but needs to cancel due to her sister being ill. States she is leaving to go to [place] to check on her sister. Patient does not want to reschedule this appointment at this time, states she feels fine. Encouraged patient to contact office if she changes her mind and wants to reschedule.

## 2017-09-21 ENCOUNTER — TELEPHONE (OUTPATIENT)
Dept: FAMILY MEDICINE | Facility: CLINIC | Age: 67
End: 2017-09-21

## 2017-09-21 NOTE — TELEPHONE ENCOUNTER
Patient's home health nurse (Ca) requests a message be sent to provider for notification that patient is not taking all of her prescribed medications. States patient reported to her she is not taking the following medications: ASA, Lexapro, Nystatin, Wellbutrin, Vit B-12, Pulmacort, tylenol, Magnesium, Cymbalta, Nitroglycerin, and Duoneb. Assured Mrs. Penaloza her concerns would be forwarded to provider for notification.

## 2017-09-21 NOTE — TELEPHONE ENCOUNTER
----- Message from Jannie Hansen sent at 9/21/2017  8:40 AM CDT -----  Contact: Ca Cohen  Home health nurse called regarding medication information. Please contact Ca [phone]

## 2017-09-22 ENCOUNTER — OUTPATIENT CASE MANAGEMENT (OUTPATIENT)
Dept: ADMINISTRATIVE | Facility: OTHER | Age: 67
End: 2017-09-22

## 2017-09-22 NOTE — PROGRESS NOTES
09/22/17-Called and spoke to Nelly Tian, 66 year old female.  Patient lives with her daughter, Maye. House has a ramp in the front. Patient is on oxygen 2 L per N/C continuous. Oxygen company is [employer]. Patient has an appointment with Dr. Tolbert, pulmonologist. Patient states that she checks her blood glucose daily, takes her blood pressure daily and weighs daily. Went over with patient signs and symptoms fo hypotension and hypertension. Went over with patient COPD, preventing lung infections. Home health did Med Rec with patient and had a discrepancy. Message sent to PCP, will follow up patient and encourage her to take medications.  Will mail to patient a pill box, education on oxygen, COPD, and blood pressure.   CHRONIC LUNG DISEASE: PREVENTING LUNG INFECTIONS (ENGLISH)   HIGH BLOOD PRESSURE, CONTROLLING (ENGLISH)   HYPOTENSION, ALL CAUSES (ENGLISH)   OXYGEN, TRAVELING WITH (ENGLISH)   OXYGEN, USING AT HOME (ENGLISH)   OXYGEN, USING SAFELY (ENGLISH)

## 2017-09-22 NOTE — TELEPHONE ENCOUNTER
She may stop: Nystatin, Vit B-12, , tylenol,Nitroglycerin Magnesium, , and Duoneb.    Needs to follow ASA, Lexapro,  Wellbutrin,  Pulmacort, , Cymbalta, and Duoneb

## 2017-09-26 ENCOUNTER — TELEPHONE (OUTPATIENT)
Dept: HEMATOLOGY/ONCOLOGY | Facility: CLINIC | Age: 67
End: 2017-09-26

## 2017-09-26 ENCOUNTER — LAB VISIT (OUTPATIENT)
Dept: LAB | Facility: HOSPITAL | Age: 67
End: 2017-09-26
Attending: INTERNAL MEDICINE
Payer: MEDICARE

## 2017-09-26 DIAGNOSIS — D64.9 ANEMIA: ICD-10-CM

## 2017-09-26 DIAGNOSIS — R07.9 CHEST PAIN, UNSPECIFIED: Primary | ICD-10-CM

## 2017-09-26 LAB
ALBUMIN SERPL BCP-MCNC: 3.1 G/DL
ALP SERPL-CCNC: 94 U/L
ALT SERPL W/O P-5'-P-CCNC: 23 U/L
ANION GAP SERPL CALC-SCNC: 10 MMOL/L
AST SERPL-CCNC: 33 U/L
BASOPHILS # BLD AUTO: 0.05 K/UL
BASOPHILS NFR BLD: 0.5 %
BILIRUB SERPL-MCNC: 0.3 MG/DL
BUN SERPL-MCNC: 14 MG/DL
CALCIUM SERPL-MCNC: 9.1 MG/DL
CHLORIDE SERPL-SCNC: 102 MMOL/L
CO2 SERPL-SCNC: 29 MMOL/L
CREAT SERPL-MCNC: 0.7 MG/DL
DIFFERENTIAL METHOD: ABNORMAL
EOSINOPHIL # BLD AUTO: 0.3 K/UL
EOSINOPHIL NFR BLD: 2.7 %
ERYTHROCYTE [DISTWIDTH] IN BLOOD BY AUTOMATED COUNT: 17.4 %
EST. GFR  (AFRICAN AMERICAN): >60 ML/MIN/1.73 M^2
EST. GFR  (NON AFRICAN AMERICAN): >60 ML/MIN/1.73 M^2
GLUCOSE SERPL-MCNC: 94 MG/DL
HCT VFR BLD AUTO: 39 %
HGB BLD-MCNC: 11.8 G/DL
IRON SERPL-MCNC: 48 UG/DL
LYMPHOCYTES # BLD AUTO: 2.2 K/UL
LYMPHOCYTES NFR BLD: 23.3 %
MCH RBC QN AUTO: 26.9 PG
MCHC RBC AUTO-ENTMCNC: 30.3 G/DL
MCV RBC AUTO: 89 FL
MONOCYTES # BLD AUTO: 0.9 K/UL
MONOCYTES NFR BLD: 9.5 %
NEUTROPHILS # BLD AUTO: 5.9 K/UL
NEUTROPHILS NFR BLD: 63.7 %
PLATELET # BLD AUTO: 202 K/UL
PMV BLD AUTO: 10.9 FL
POTASSIUM SERPL-SCNC: 4 MMOL/L
PROT SERPL-MCNC: 7 G/DL
RBC # BLD AUTO: 4.38 M/UL
SATURATED IRON: 12 %
SODIUM SERPL-SCNC: 141 MMOL/L
TOTAL IRON BINDING CAPACITY: 401 UG/DL
TRANSFERRIN SERPL-MCNC: 271 MG/DL
WBC # BLD AUTO: 9.34 K/UL

## 2017-09-26 PROCEDURE — 82728 ASSAY OF FERRITIN: CPT

## 2017-09-26 PROCEDURE — 83540 ASSAY OF IRON: CPT

## 2017-09-26 PROCEDURE — 80053 COMPREHEN METABOLIC PANEL: CPT

## 2017-09-26 PROCEDURE — 85025 COMPLETE CBC W/AUTO DIFF WBC: CPT

## 2017-09-26 NOTE — TELEPHONE ENCOUNTER
Called Meeta with [facility] and told her that pt was scheduled for a CBC w/diff, CMP, Ferritin, Fe/tibc.  Meeta verbalizes understanding.

## 2017-09-26 NOTE — TELEPHONE ENCOUNTER
----- Message from Pk Sy sent at 9/26/2017 10:57 AM CDT -----  Contact: Meeta with [facility]   [facility] want to speak with a nurse regarding patient want lab work done at home, Please call back [phone]

## 2017-09-26 NOTE — TELEPHONE ENCOUNTER
----- Message from Shelia Smart sent at 9/25/2017  4:25 PM CDT -----  Contact: Kim [facility]  Patient has appointment on 10/6 with Doctor Richard and she has lab work that day.  Patient is requesting that Meeta do the lab draws at her home tomorrow and Meeta needs call back to let her know what labs needs to be done.  Please call [phone].

## 2017-09-27 ENCOUNTER — OUTPATIENT CASE MANAGEMENT (OUTPATIENT)
Dept: ADMINISTRATIVE | Facility: OTHER | Age: 67
End: 2017-09-27

## 2017-09-27 LAB — FERRITIN SERPL-MCNC: 54 NG/ML

## 2017-09-27 NOTE — PROGRESS NOTES
09/27/17-Received voice mail from patient that she received the educational material and her pill box. Asking for a pill cutter. Sending patient a pill cutter from [facility]. Will follow up with patient at a later time.

## 2017-10-02 ENCOUNTER — TELEPHONE (OUTPATIENT)
Dept: ORTHOPEDICS | Facility: CLINIC | Age: 67
End: 2017-10-02

## 2017-10-02 NOTE — TELEPHONE ENCOUNTER
----- Message from Ana Garcia sent at 10/2/2017 10:09 AM CDT -----  Contact: Nelly  Calling to get an appointment to have injections in both knees. Please call [phone] (home)  Or [phone]. Call cell first. She has a doctor's appointment today

## 2017-10-03 ENCOUNTER — OFFICE VISIT (OUTPATIENT)
Dept: CARDIOLOGY | Facility: CLINIC | Age: 67
End: 2017-10-03
Payer: MEDICARE

## 2017-10-03 ENCOUNTER — ANTI-COAG VISIT (OUTPATIENT)
Dept: CARDIOLOGY | Facility: CLINIC | Age: 67
End: 2017-10-03

## 2017-10-03 VITALS
WEIGHT: 293 LBS | SYSTOLIC BLOOD PRESSURE: 119 MMHG | BODY MASS INDEX: 44.41 KG/M2 | HEIGHT: 68 IN | HEART RATE: 59 BPM | DIASTOLIC BLOOD PRESSURE: 55 MMHG | OXYGEN SATURATION: 93 %

## 2017-10-03 DIAGNOSIS — I48.20 CHRONIC A-FIB: Primary | ICD-10-CM

## 2017-10-03 DIAGNOSIS — Z79.01 LONG TERM CURRENT USE OF ANTICOAGULANT THERAPY: ICD-10-CM

## 2017-10-03 DIAGNOSIS — I48.20 CHRONIC A-FIB: ICD-10-CM

## 2017-10-03 LAB — INR PPP: 4 (ref 2–3)

## 2017-10-03 PROCEDURE — 99214 OFFICE O/P EST MOD 30 MIN: CPT | Mod: S$GLB,,, | Performed by: INTERNAL MEDICINE

## 2017-10-03 PROCEDURE — 99999 PR PBB SHADOW E&M-EST. PATIENT-LVL III: CPT | Mod: PBBFAC,,, | Performed by: INTERNAL MEDICINE

## 2017-10-03 PROCEDURE — 99499 UNLISTED E&M SERVICE: CPT | Mod: S$GLB,,, | Performed by: INTERNAL MEDICINE

## 2017-10-03 PROCEDURE — 93000 ELECTROCARDIOGRAM COMPLETE: CPT | Mod: S$GLB,,, | Performed by: INTERNAL MEDICINE

## 2017-10-03 RX ORDER — BUDESONIDE 0.5 MG/2ML
3 INHALANT ORAL DAILY
COMMUNITY
Start: 2017-09-10 | End: 2017-12-28 | Stop reason: SDUPTHER

## 2017-10-03 RX ORDER — ESCITALOPRAM OXALATE 20 MG/1
TABLET ORAL
COMMUNITY
Start: 2017-08-20 | End: 2017-10-25

## 2017-10-03 NOTE — PROGRESS NOTES
[facility]    CC:  HOSPITAL  Follow-up  Chief Complaint   Patient presents with    Follow-up     NP Hosp F/U Former [facility] pt LOV 2015       Patient ID: Nelly Tian is a 66 y.o. female with a past medical history of chronic atrial fibrillation, atypical chest pain, morbid obesity    HPI  SHe was recently in the hospital with atypical chest pain.     she was seen and evaluated by Dr. Mack and discharged.  sHe has been doing well since then.  sHe has given up smoking    Past Medical History:   Diagnosis Date    *Atrial flutter     Angina pectoris 2017    Anxiety     Arthritis     Asthma     Atrial fibrillation     Back pain     Cataract     OD    CHF (congestive heart failure)     COPD (chronic obstructive pulmonary disease)     Depression     Diabetes mellitus     Emphysema of lung     Heart failure     Hernia     Hypercapnic respiratory failure, chronic 2016    Hyperlipidemia     Hypertension     Iron deficiency anemia 2/3/2016    Myocardial infarction     Obesity     Peripheral vascular disease     Pneumonia     Polyneuropathy     Retinal detachment     OS    Septic shock 2017    Skin ulcer 3/18/2017    Tobacco dependence     Type II 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, not stated as uncontrolled(250.60)      Past Surgical History:   Procedure Laterality Date    BREAST BIOPSY Left 10 yrs ago    benign    CATARACT EXTRACTION Left     OS      SECTION      x2    EYE SURGERY      HYSTERECTOMY      OOPHORECTOMY      one ovary conserved    RETINAL DETACHMENT SURGERY      buckle --OS    TONSILLECTOMY       Social History     Social History    Marital status:      Spouse name: N/A    Number of children: N/A    Years of education: N/A     Occupational History    Not on file.     Social History Main Topics    Smoking status: Current Every Day Smoker     Packs/day: 0.25     Years: 50.00     Types:  Cigarettes     Start date: 1/19/1968    Smokeless tobacco: Former User     Quit date: 2/12/2017      Comment: 1 ppd for 20 years    Alcohol use No    Drug use: No    Sexual activity: Not Currently     Other Topics Concern    Not on file     Social History Narrative    No narrative on file     Family History   Problem Relation Age of Onset    Arthritis Father     Heart disease Father     Early death Sister 30     heart disease    Heart disease Sister 32     MI    No Known Problems Brother     No Known Problems Daughter     Blindness Son      due to accident//    Arthritis Sister     Heart disease Sister     Crohn's disease Sister     Cancer Brother      asbestosis    Alcohol abuse Mother     Breast cancer Neg Hx     Glaucoma Neg Hx     Macular degeneration Neg Hx     Retinal detachment Neg Hx     Amblyopia Neg Hx     Diabetes Neg Hx     Cataracts Neg Hx     Hypertension Neg Hx     Strabismus Neg Hx     Stroke Neg Hx     Thyroid disease Neg Hx        Review of patient's allergies indicates:   Allergen Reactions    Dilaudid [hydromorphone] Anaphylaxis     Other reaction(s): Anaphylaxis  Other reaction(s): Unknown       Medication List with Changes/Refills   New Medications    APIXABAN 5 MG TAB    Take 1 tablet (5 mg total) by mouth 2 (two) times daily.   Current Medications    ALBUTEROL-IPRATROPIUM 2.5MG-0.5MG/3ML (DUO-NEB) 0.5 MG-3 MG(2.5 MG BASE)/3 ML NEBULIZER SOLUTION    INHALE THE CONTENTS OF 1 VIAL VIA NEBULIZER EVERY 6 HOURS AS NEEDED FOR  WHEEZING (DO NOT USE WITH ALBUTEROL INHALER)    ANORO ELLIPTA 62.5-25 MCG/ACTUATION DSDV    INHALE 1 PUFF INTO THE LUNGS ONCE DAILY AT 6AM. CONTROLLER    ASCORBIC ACID, VITAMIN C, (VITAMIN C) 500 MG TABLET    Take 1 tablet (500 mg total) by mouth every evening.    ATORVASTATIN (LIPITOR) 20 MG TABLET    TAKE 1 TABLET (20 MG TOTAL) BY MOUTH ONCE DAILY.    BUDESONIDE (PULMICORT) 0.5 MG/2 ML NEBULIZER SOLUTION        BUPROPION (WELLBUTRIN) 75 MG  TABLET    Take 1 tablet (75 mg total) by mouth 2 (two) times daily.    CEPHALEXIN (KEFLEX) 500 MG CAPSULE    TAKE 1 CAPSULE (500 MG TOTAL) BY MOUTH ONCE DAILY AT 6:00A.M.    CLONAZEPAM (KLONOPIN) 1 MG TABLET    Take 1 tablet (1 mg total) by mouth 2 (two) times daily as needed.    CLONAZEPAM (KLONOPIN) 1 MG TABLET    TAKE ONE TABLET BY MOUTH TWICE DAILY AS NEEDED FOR ANXIETY    ESCITALOPRAM OXALATE (LEXAPRO) 20 MG TABLET        FUROSEMIDE (LASIX) 40 MG TABLET    TAKE ONE TABLET ONCE DAILY FOR FLUID OVERLOAD    GABAPENTIN (NEURONTIN) 800 MG TABLET    Take 1 tablet (800 mg total) by mouth 3 (three) times daily.    HYDROCODONE-ACETAMINOPHEN 10-325MG (NORCO)  MG TAB    Take 1 tablet by mouth every 6 (six) hours as needed for Pain.    HYDROCODONE-ACETAMINOPHEN 10-325MG (NORCO)  MG TAB    Take 1 tablet by mouth every 6 (six) hours as needed for Pain.    HYDROCODONE-ACETAMINOPHEN 10-325MG (NORCO)  MG TAB    Take 1 tablet by mouth every 6 (six) hours as needed for Pain.    LEVALBUTEROL (XOPENEX) 0.63 MG/3 ML NEBULIZER SOLUTION    INHALE THE CONTENTS OF 1 VIAL BY NEBULIZATION 3 (THREE) TIMES DAILY.    LISINOPRIL (PRINIVIL,ZESTRIL) 20 MG TABLET    TAKE 1 TABLET EVERY DAY    MAGNESIUM OXIDE (MAG-OX) 400 MG TABLET    Take 1 tablet (400 mg total) by mouth 2 (two) times daily.    METFORMIN (GLUCOPHAGE) 500 MG TABLET    TAKE 1 TABLET TWICE DAILY WITH MEALS    METHOCARBAMOL (ROBAXIN) 750 MG TAB    TAKE 1 TABLET FOUR TIMES DAILY    METOPROLOL SUCCINATE (TOPROL-XL) 25 MG 24 HR TABLET    TAKE 1/2 TABLET EVERY DAY    MULTIVITAMIN (THERAGRAN) TABLET    Take 1 tablet by mouth once daily.    NICOTINE (NICODERM CQ) 21 MG/24 HR    Place 1 patch onto the skin once daily.    NYSTATIN (MYCOSTATIN) POWDER    Apply topically 4 (four) times daily.    NYSTATIN-TRIAMCINOLONE (MYCOLOG II) CREAM    APPLY SPARINGLY TO AFFECTED AREA 3 TIMES A DAY AS NEEDED    POLYETHYLENE GLYCOL (GLYCOLAX) 17 GRAM/DOSE POWDER    MIX 1 CAPFUL (17GM) IN  "LIQUID AND DRINK BEFORE BREAKFAST    POTASSIUM CHLORIDE SA (K-DUR,KLOR-CON) 20 MEQ TABLET    Take 1 tablet (20 mEq total) by mouth once daily.    TEMAZEPAM (RESTORIL) 15 MG CAP    Take 1 capsule (15 mg total) by mouth nightly as needed.    TRAZODONE (DESYREL) 150 MG TABLET    Take 0.5 tablets (75 mg total) by mouth every evening.    TRUE METRIX GLUCOSE TEST STRIP STRP    TEST BLOOD SUGAR THREE TIMES DAILY    VENTOLIN HFA 90 MCG/ACTUATION INHALER    INHALE TWO PUFFS EVERY 6 HOURS AS NEEDED FOR WHEEZING   Discontinued Medications    WARFARIN (COUMADIN) 5 MG TABLET    Take 0.5 tablets (2.5 mg total) by mouth Daily.       Review of Systems  Constitution: Denies chills, fever, and sweats.  HENT: Denies headaches or blurry vision.  Cardiovascular: Denies chest pain or irregular heart beat.  Respiratory: Denies cough or shortness of breath.  Gastrointestinal: Denies abdominal pain, nausea, or vomiting.  Musculoskeletal: Denies muscle cramps.  Neurological: Denies dizziness or focal weakness.  Psychiatric/Behavioral: Normal mental status.  Hematologic/Lymphatic: Denies bleeding problem or easy bruising/bleeding.  Skin: Denies rash or suspicious lesions    Physical Examination  BP (!) 119/55 (BP Location: Right arm, Patient Position: Sitting)   Pulse (!) 59   Ht 5' 8" (1.727 m)   Wt (!) 141.7 kg (312 lb 6.3 oz)   SpO2 (!) 93%   BMI 47.50 kg/m²     Constitutional: No acute distress, conversant  HEENT: Sclera anicteric, Pupils equal, round and reactive to light, extraocular motions intact, Oropharynx clear  Neck: No JVD, no carotid bruits  Cardiovascular: irregular rate and rhythm, no murmur, rubs or gallops, normal S1/S2  Pulmonary: Clear to auscultation bilaterally  Abdominal: Abdomen soft, nontender, nondistended, positive bowel sounds  Extremities: No lower extremity edema,   Pulses:  Carotid pulses are 2+ on the right side, and 2+ on the left side.  Radial pulses are 2+ on the right side, and 2+ on the left side.     "   Skin: No ecchymosis, erythema, or ulcers  Psych: Alert and oriented x 3, appropriate affect  Neuro: CNII-XII intact, no focal deficits    Labs:  Most Recent Data  CBC:   Lab Results   Component Value Date    WBC 9.34 09/26/2017    HGB 11.8 (L) 09/26/2017    HCT 39.0 09/26/2017     09/26/2017    MCV 89 09/26/2017    RDW 17.4 (H) 09/26/2017     BMP:   Lab Results   Component Value Date     09/26/2017    K 4.0 09/26/2017     09/26/2017    CO2 29 09/26/2017    BUN 14 09/26/2017    CREATININE 0.7 09/26/2017    GLU 94 09/26/2017    CALCIUM 9.1 09/26/2017    MG 2.0 04/25/2017    PHOS 3.1 04/25/2017     LFTS;   Lab Results   Component Value Date    PROT 7.0 09/26/2017    ALBUMIN 3.1 (L) 09/26/2017    BILITOT 0.3 09/26/2017    AST 33 09/26/2017    ALKPHOS 94 09/26/2017    ALT 23 09/26/2017     COAGS:   Lab Results   Component Value Date    INR 4.0 (A) 10/03/2017     FLP:   Lab Results   Component Value Date    CHOL 117 (L) 05/08/2017    HDL 33 (L) 05/08/2017    LDLCALC 38.6 (L) 05/08/2017    TRIG 227 (H) 05/08/2017    CHOLHDL 28.2 05/08/2017     CARDIAC:   Lab Results   Component Value Date    TROPONINI <0.006 09/18/2017    BNP 54 09/17/2017       Imaging:    EKG: atrial fibrillation with controlled ventricular response     Echo:2.2016  CONCLUSIONS     1 - Enlarged left ventricular enlargement.     2 - Eccentric hypertrophy.     3 - Low normal to mildly depressed left ventricular systolic function (EF 50-55%).     4 - Right ventricular enlargement with not well seen systolic function.     5 - The estimated PA systolic pressure is 39 mmHg.     6 - Very difficult windows, Optison contrast used for wall motion analysis.     7 - Suggestion for reduced LV function from Echo in 11/2016.     Stress Testing:    I have personally reviewed these images and echo data    Assessment/Plan:  Nelly was seen today for follow-up.    Diagnoses and all orders for this visit:    Chronic a-fib  -     EKG 12-lead  -     2D  Echo w/ Color Flow Doppler; Future    Other orders  -     apixaban 5 mg Tab; Take 1 tablet (5 mg total) by mouth 2 (two) times daily.     her INR has been fluctuating.  She told me she is tired of getting multiple blood tests and the fact that her dose has to be titrated every time   Her CHADSVAS score is 3. HASBLED is 1.  We discussed apixaban.  I told her that this medication is noninferior to Coumadin for stroke prevention.  Bleeding risk is compatible to Coumadin.  Bleeding risk include risk of mild bruising on the skin, GI bleed or even fatal intracerebral or intraocular bleeds.  However in her case the benefits outweighed the risks.  After a long discussion she was in agreement to switch to apixaban.  I have instructed her to start this medication only when the INR is below 2.  She understands that she will not take this medication along with Coumadin.  Her last echo done in February had low normal ejection fraction.  I would repeat it later on this year to evaluate her cardiovascular hemodynamics and to ensure that the ejection fraction is not on its way down      Return in about 4 months (around 2/3/2018).           Total duration of face to face visit time 60 minutes.  Total time spent counseling greater than fifty percent of total visit time.  Counseling included discussion regarding imaging findings, diagnosis, possibilities, treatment options, risks and benefits.  The patient had many questions regarding the options and long-term effect which were all answered to my best ability.      Killian Cook MD,MRCP,RPVI,FACC,FSCAI.  Interventional Cardiology   Phone [phone]

## 2017-10-05 ENCOUNTER — TELEPHONE (OUTPATIENT)
Dept: HEMATOLOGY/ONCOLOGY | Facility: CLINIC | Age: 67
End: 2017-10-05

## 2017-10-05 NOTE — TELEPHONE ENCOUNTER
Called pt to confirm her Dr. Ramos appt for tomorrow and pt had to reschedule because her truck is broken.  Dr. Ramos appt rescheduled per pts request.  Pt verbalizes understanding.

## 2017-10-06 ENCOUNTER — OUTPATIENT CASE MANAGEMENT (OUTPATIENT)
Dept: ADMINISTRATIVE | Facility: OTHER | Age: 67
End: 2017-10-06

## 2017-10-06 NOTE — PROGRESS NOTES
10/06/17- Called and spoke to patient. Provided her with the phone number for [facility] emergency preparedness and information on emergency preparedness. Patient states that they will probably not evacuate.                                                                        [x] Please be sure to have a supply of water, non-perishable food items, flashlights and batteries with you in your house.   [x] Please be sure you bring all of your medications with you. Bring at least a five day supply. It is best to bring your medication bottles, in case you are displaced for a longer period of time, therefore you can get your medication refilled from your temporary location.   [x] Please bring sure to bring any DME that you may need. This includes walkers, wheelchairs, shower chairs, nebulizer machine, etc.   [x] If you are a diabetic- be sure to bring your glucometer and all glucometer monitoring supplies.   [x] If you have high blood pressure- be sure to bring your blood pressure cuff so you can continue to monitor.   [] If you have CHF-be sure to bring your scale so you can continue to monitor.  [] If on PEG feeding- be sure to bring tube feedings and feeding supplies.  [] If on oxygen- be sure to bring all of your oxygen supplies: Cannula, portable tanks, concentrator, etc. Also contact you Oxygen Supply Company to find out the nearest location of an oxygen supply company to where you will be located. ([employer]: [phone], [facility]: [phone], [facility]:[phone], [employer]: [phone]).   [] If you receive hemodialysis- you should already have a plan in place with your dialysis center. If you do not know where you need to evacuate to in order to be close to a dialysis center- reach out to your dialysis center for further direction. ([facility]  service line: [phone], [facility]  service line [phone])  [] If receiving treatment at an infusion center- please  contact the infusion center to find out which infusion center they have a contract with. Also ask your infusion center for location of the infusion center they are in contract with, so if need be you can evacuate to that area.   Office of Emergency Preparedness Phone number:  [] [facility]: [phone]  [] [facility]: [phone]  [x] [facility]: [phone]  [] [facility]: [phone]  [] [place] [facility]: [phone]  [] [place]: [phone]  [] [facility]: [phone]  [] [facility]: [phone]  [] [facility]: [phone]  [] [facility]: [phone]  [] [facility]: [phone]  [] [facility]: [phone]  [] [facility]: [phone]    [] [facility]:  [phone]   [] [facility]:  [phone]   [] [facility]:  [phone]   [] [facility]:  [phone]   [] [place]:  [phone]   [] [facility]: [phone]   [] [place]:  [phone]   [] [facility]:  [phone]   [] [facility]:  [phone]   [] [facility]:  [phone]   [] [facility]:  [phone]   [] [place]:  [phone]   [] [place]:  [phone]   [] [facility]:  [phone]   [] [place]:  [phone]   [] [facility]: [phone]   [] [facility]:  [phone]   [] [facility]: [phone]   [] [facility]: [phone]

## 2017-10-09 DIAGNOSIS — M25.561 PAIN IN BOTH KNEES, UNSPECIFIED CHRONICITY: Primary | ICD-10-CM

## 2017-10-09 DIAGNOSIS — M25.562 PAIN IN BOTH KNEES, UNSPECIFIED CHRONICITY: Primary | ICD-10-CM

## 2017-10-09 RX ORDER — NYSTATIN AND TRIAMCINOLONE ACETONIDE 100000; 1 [USP'U]/G; MG/G
CREAM TOPICAL
Qty: 45 G | Refills: 1 | Status: SHIPPED | OUTPATIENT
Start: 2017-10-09 | End: 2017-10-31 | Stop reason: SDUPTHER

## 2017-10-09 NOTE — TELEPHONE ENCOUNTER
Patient requesting a refill of Nystatin-Triamcinolone Cream.  LR--8-11-17  LOV--7-18-17  FOV--10-27-17

## 2017-10-10 ENCOUNTER — HOSPITAL ENCOUNTER (OUTPATIENT)
Dept: RADIOLOGY | Facility: HOSPITAL | Age: 67
Discharge: HOME | End: 2017-10-10
Attending: ORTHOPAEDIC SURGERY
Payer: MEDICARE

## 2017-10-10 ENCOUNTER — OFFICE VISIT (OUTPATIENT)
Dept: ORTHOPEDICS | Facility: CLINIC | Age: 67
End: 2017-10-10
Payer: MEDICARE

## 2017-10-10 VITALS
HEIGHT: 68 IN | BODY MASS INDEX: 44.41 KG/M2 | SYSTOLIC BLOOD PRESSURE: 150 MMHG | WEIGHT: 293 LBS | DIASTOLIC BLOOD PRESSURE: 66 MMHG | HEART RATE: 63 BPM

## 2017-10-10 DIAGNOSIS — M17.0 PRIMARY OSTEOARTHRITIS OF BOTH KNEES: Primary | ICD-10-CM

## 2017-10-10 DIAGNOSIS — M25.562 PAIN IN BOTH KNEES, UNSPECIFIED CHRONICITY: ICD-10-CM

## 2017-10-10 DIAGNOSIS — M25.561 PAIN IN BOTH KNEES, UNSPECIFIED CHRONICITY: ICD-10-CM

## 2017-10-10 PROCEDURE — 99213 OFFICE O/P EST LOW 20 MIN: CPT | Mod: 25,S$GLB,, | Performed by: ORTHOPAEDIC SURGERY

## 2017-10-10 PROCEDURE — 20610 DRAIN/INJ JOINT/BURSA W/O US: CPT | Mod: 50,S$GLB,, | Performed by: ORTHOPAEDIC SURGERY

## 2017-10-10 PROCEDURE — 99999 PR PBB SHADOW E&M-EST. PATIENT-LVL III: CPT | Mod: PBBFAC,,, | Performed by: ORTHOPAEDIC SURGERY

## 2017-10-10 PROCEDURE — 73564 X-RAY EXAM KNEE 4 OR MORE: CPT | Mod: TC,50,PN

## 2017-10-10 PROCEDURE — 73564 X-RAY EXAM KNEE 4 OR MORE: CPT | Mod: 26,50,, | Performed by: RADIOLOGY

## 2017-10-10 RX ORDER — TRIAMCINOLONE ACETONIDE 40 MG/ML
60 INJECTION, SUSPENSION INTRA-ARTICULAR; INTRAMUSCULAR
Status: DISCONTINUED | OUTPATIENT
Start: 2017-10-10 | End: 2017-10-10 | Stop reason: HOSPADM

## 2017-10-10 RX ORDER — TRIAMCINOLONE ACETONIDE 40 MG/ML
40 INJECTION, SUSPENSION INTRA-ARTICULAR; INTRAMUSCULAR
Status: DISCONTINUED | OUTPATIENT
Start: 2017-10-10 | End: 2017-10-10 | Stop reason: HOSPADM

## 2017-10-10 RX ADMIN — TRIAMCINOLONE ACETONIDE 60 MG: 40 INJECTION, SUSPENSION INTRA-ARTICULAR; INTRAMUSCULAR at 05:10

## 2017-10-10 RX ADMIN — TRIAMCINOLONE ACETONIDE 40 MG: 40 INJECTION, SUSPENSION INTRA-ARTICULAR; INTRAMUSCULAR at 05:10

## 2017-10-10 NOTE — PROGRESS NOTES
Past Medical History:   Diagnosis Date    *Atrial flutter     Angina pectoris 2017    Anxiety     Arthritis     Asthma     Atrial fibrillation     Back pain     Cataract     OD    CHF (congestive heart failure)     COPD (chronic obstructive pulmonary disease)     Depression     Diabetes mellitus     Emphysema of lung     Heart failure     Hernia     Hypercapnic respiratory failure, chronic 2016    Hyperlipidemia     Hypertension     Iron deficiency anemia 2/3/2016    Myocardial infarction     Obesity     Peripheral vascular disease     Pneumonia     Polyneuropathy     Retinal detachment     OS    Septic shock 2017    Skin ulcer 3/18/2017    Tobacco dependence     Type II 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, not stated as uncontrolled(250.60)        Past Surgical History:   Procedure Laterality Date    BREAST BIOPSY Left 10 yrs ago    benign    CATARACT EXTRACTION Left     OS      SECTION      x2    EYE SURGERY      HYSTERECTOMY      OOPHORECTOMY      one ovary conserved    RETINAL DETACHMENT SURGERY      buckle --OS    TONSILLECTOMY         Current Outpatient Prescriptions   Medication Sig    albuterol-ipratropium 2.5mg-0.5mg/3mL (DUO-NEB) 0.5 mg-3 mg(2.5 mg base)/3 mL nebulizer solution INHALE THE CONTENTS OF 1 VIAL VIA NEBULIZER EVERY 6 HOURS AS NEEDED FOR  WHEEZING (DO NOT USE WITH ALBUTEROL INHALER)    ANORO ELLIPTA 62.5-25 mcg/actuation DsDv INHALE 1 PUFF INTO THE LUNGS ONCE DAILY AT 6AM. CONTROLLER    apixaban 5 mg Tab Take 1 tablet (5 mg total) by mouth 2 (two) times daily.    ascorbic acid, vitamin C, (VITAMIN C) 500 MG tablet Take 1 tablet (500 mg total) by mouth every evening.    atorvastatin (LIPITOR) 20 MG tablet TAKE 1 TABLET (20 MG TOTAL) BY MOUTH ONCE DAILY.    budesonide (PULMICORT) 0.5 mg/2 mL nebulizer solution     buPROPion (WELLBUTRIN) 75 MG tablet Take 1 tablet (75 mg total) by mouth 2 (two)  times daily.    cephALEXin (KEFLEX) 500 MG capsule TAKE 1 CAPSULE (500 MG TOTAL) BY MOUTH ONCE DAILY AT 6:00A.M.    clonazePAM (KLONOPIN) 1 MG tablet Take 1 tablet (1 mg total) by mouth 2 (two) times daily as needed.    clonazePAM (KLONOPIN) 1 MG tablet TAKE ONE TABLET BY MOUTH TWICE DAILY AS NEEDED FOR ANXIETY    escitalopram oxalate (LEXAPRO) 20 MG tablet     furosemide (LASIX) 40 MG tablet TAKE ONE TABLET ONCE DAILY FOR FLUID OVERLOAD    gabapentin (NEURONTIN) 800 MG tablet Take 1 tablet (800 mg total) by mouth 3 (three) times daily.    hydrocodone-acetaminophen 10-325mg (NORCO)  mg Tab Take 1 tablet by mouth every 6 (six) hours as needed for Pain.    hydrocodone-acetaminophen 10-325mg (NORCO)  mg Tab Take 1 tablet by mouth every 6 (six) hours as needed for Pain.    [START ON 10/19/2017] hydrocodone-acetaminophen 10-325mg (NORCO)  mg Tab Take 1 tablet by mouth every 6 (six) hours as needed for Pain.    levalbuterol (XOPENEX) 0.63 mg/3 mL nebulizer solution INHALE THE CONTENTS OF 1 VIAL BY NEBULIZATION 3 (THREE) TIMES DAILY.    lisinopril (PRINIVIL,ZESTRIL) 20 MG tablet TAKE 1 TABLET EVERY DAY    magnesium oxide (MAG-OX) 400 mg tablet Take 1 tablet (400 mg total) by mouth 2 (two) times daily.    metformin (GLUCOPHAGE) 500 MG tablet TAKE 1 TABLET TWICE DAILY WITH MEALS    methocarbamol (ROBAXIN) 750 MG Tab TAKE 1 TABLET FOUR TIMES DAILY    metoprolol succinate (TOPROL-XL) 25 MG 24 hr tablet TAKE 1/2 TABLET EVERY DAY    multivitamin (THERAGRAN) tablet Take 1 tablet by mouth once daily.    nicotine (NICODERM CQ) 21 mg/24 hr Place 1 patch onto the skin once daily.    nystatin (MYCOSTATIN) powder Apply topically 4 (four) times daily.    nystatin-triamcinolone (MYCOLOG II) cream Apply 1 gm to affected are no more than twice a day for only 2 weeks at a time.    polyethylene glycol (GLYCOLAX) 17 gram/dose powder MIX 1 CAPFUL (17GM) IN LIQUID AND DRINK BEFORE BREAKFAST    potassium  chloride SA (K-DUR,KLOR-CON) 20 MEQ tablet Take 1 tablet (20 mEq total) by mouth once daily.    temazepam (RESTORIL) 15 mg Cap Take 1 capsule (15 mg total) by mouth nightly as needed.    trazodone (DESYREL) 150 MG tablet Take 0.5 tablets (75 mg total) by mouth every evening.    TRUE METRIX GLUCOSE TEST STRIP Strp TEST BLOOD SUGAR THREE TIMES DAILY    VENTOLIN HFA 90 mcg/actuation inhaler INHALE TWO PUFFS EVERY 6 HOURS AS NEEDED FOR WHEEZING     No current facility-administered medications for this visit.        Allergies   Allergen Reactions    Dilaudid [Hydromorphone] Anaphylaxis     Other reaction(s): Anaphylaxis  Other reaction(s): Unknown       Family History   Problem Relation Age of Onset    Arthritis Father     Heart disease Father     Early death Sister 30     heart disease    Heart disease Sister 32     MI    No Known Problems Brother     No Known Problems Daughter     Blindness Son      due to accident//    Arthritis Sister     Heart disease Sister     Crohn's disease Sister     Cancer Brother      asbestosis    Alcohol abuse Mother     Breast cancer Neg Hx     Glaucoma Neg Hx     Macular degeneration Neg Hx     Retinal detachment Neg Hx     Amblyopia Neg Hx     Diabetes Neg Hx     Cataracts Neg Hx     Hypertension Neg Hx     Strabismus Neg Hx     Stroke Neg Hx     Thyroid disease Neg Hx        Social History     Social History    Marital status:      Spouse name: N/A    Number of children: N/A    Years of education: N/A     Occupational History    Not on file.     Social History Main Topics    Smoking status: Current Every Day Smoker     Packs/day: 0.25     Years: 50.00     Types: Cigarettes     Start date: 1/19/1968    Smokeless tobacco: Former User     Quit date: 2/12/2017      Comment: 1 ppd for 20 years    Alcohol use No    Drug use: No    Sexual activity: Not Currently     Other Topics Concern    Not on file     Social History Narrative    No narrative on  file       Chief Complaint:   Chief Complaint   Patient presents with    Knee Pain     BILATERAL KNEE PAIN       Consulting Physician: No ref. provider found    History of present illness: This is a 64-year-old young lady who reports bilateral knee pain.  We have seen her before and injected her with Synvisc and she has been doing very well since that point in time.  She states her pain started to increase in the last month or so.  She puts her pain up to a 10 out of 10 with activities.      Review of Systems:    Constitution: Denies chills, fever, and sweats.    HENT: Denies headaches. Reports blurry vision.    Cardiovascular: Denies chest pain or irregular heart beat.    Respiratory: Denies cough. Reports shortness of breath.    Gastrointestinal: Denies abdominal pain, nausea, or vomiting.    Musculoskeletal:  Denies muscle cramps.    Neurological: Denies dizziness or focal weakness.    Psychiatric/Behavioral: Normal mental status. Anxiety.    Hematologic/Lymphatic: Denies bleeding problem or easy bruising/bleeding.    Skin: Denies rash or suspicious lesions.      Examination:    Vital Signs:    Vitals:    10/10/17 1013   BP: (!) 150/66   Pulse: 63       Body mass index is 47.5 kg/m².    This a well-developed, well nourished patient in no acute distress.    Alert and oriented and cooperative to examination.       Physical Exam: Left Knee Exam    Gait   antalgic    Skin  Rash:   None  Scars:   None    Inspection  Erythema:  None  Ecchymosis:  None  Effusion:  Mild  Masses:  None  Lymphadenopathy: None    Range of Motion: Full - 0 to 130°    Medial Joint : Mild  Lateral Joint : Mild    Patellofemoral Tenderness: None  Patellofemoral Crepitus: None    Lachman:  Normal  Anterior Drawer: Normal  Posterior Drawer: Normal    Dilia's:  Negative  Apley's:  Negative    Varus Stress:  Stable  Valgus Stress: Stable    Strength:  5/5    Pulses:  Palpable distal    Sensation:  Intact      Right Knee  Exam    Gait:   Antalgic    Skin  Rash:   None  Scars:   None    Inspection  Erythema:  None  Ecchymosis:  None  Effusion:  Mild  Masses:  None  Lymphadenopathy: None    Range of Motion: Full - 0 to 130°    Medial Joint : Mild  Lateral Joint : Mild    Patellofemoral Tenderness: None  Patellofemoral Crepitus: None    Lachman:  Normal  Anterior Drawer: Normal  Posterior Drawer: Normal    Dilia's:  Negative  Apley's:  Negative    Varus Stress:  Stable  Valgus Stress: Stable    Strength:  5/5    Pulses:  Palpable distal    Sensation:  Intact          Imaging: X-rays ordered and reviewed personally of both knees reveals some degenerative changes that are moderate.     Assessment: Primary osteoarthritis of both knees  -     Large Joint Aspiration/Injection        Plan: We gave her bilateral steroid injections today.  We will do a Synvisc one injection at her next visit.      DISCLAIMER: This note may have been dictated using voice recognition software and may contain grammatical errors. Report sent to referring provider as required.

## 2017-10-10 NOTE — PROCEDURES
Large Joint Aspiration/Injection  Date/Time: 10/10/2017 5:20 PM  Performed by: DEE WILLIAMSON  Authorized by: DEE WILLIAMSON     Consent Done?:  Yes (Verbal)  Indications:  Pain  Timeout: Prior to procedure the correct patient, procedure, and site was verified      Location:  Knee  Site:  R knee and L knee  Prep: Patient was prepped and draped in usual sterile fashion    Approach:  Anteromedial  Medications:  40 mg triamcinolone acetonide 40 mg/mL; 60 mg triamcinolone acetonide 40 mg/mL

## 2017-10-11 ENCOUNTER — HOSPITAL ENCOUNTER (EMERGENCY)
Facility: HOSPITAL | Age: 67
Discharge: HOME | End: 2017-10-11
Attending: EMERGENCY MEDICINE
Payer: MEDICARE

## 2017-10-11 ENCOUNTER — TELEPHONE (OUTPATIENT)
Dept: FAMILY MEDICINE | Facility: CLINIC | Age: 67
End: 2017-10-11

## 2017-10-11 VITALS
TEMPERATURE: 98 F | DIASTOLIC BLOOD PRESSURE: 79 MMHG | SYSTOLIC BLOOD PRESSURE: 118 MMHG | RESPIRATION RATE: 20 BRPM | OXYGEN SATURATION: 99 % | HEIGHT: 68 IN | BODY MASS INDEX: 44.41 KG/M2 | HEART RATE: 76 BPM | WEIGHT: 293 LBS

## 2017-10-11 DIAGNOSIS — N30.00 ACUTE CYSTITIS WITHOUT HEMATURIA: Primary | ICD-10-CM

## 2017-10-11 DIAGNOSIS — R73.9 HYPERGLYCEMIA: ICD-10-CM

## 2017-10-11 LAB
ALBUMIN SERPL BCP-MCNC: 3.3 G/DL
ALLENS TEST: ABNORMAL
ALP SERPL-CCNC: 86 U/L
ALT SERPL W/O P-5'-P-CCNC: 26 U/L
ANION GAP SERPL CALC-SCNC: 11 MMOL/L
AST SERPL-CCNC: 19 U/L
B-OH-BUTYR BLD STRIP-SCNC: 0.1 MMOL/L
BACTERIA #/AREA URNS HPF: ABNORMAL /HPF
BASOPHILS # BLD AUTO: 0.1 K/UL
BASOPHILS NFR BLD: 0.5 %
BILIRUB SERPL-MCNC: 0.3 MG/DL
BILIRUB UR QL STRIP: NEGATIVE
BUN SERPL-MCNC: 17 MG/DL
CALCIUM SERPL-MCNC: 9 MG/DL
CHLORIDE SERPL-SCNC: 100 MMOL/L
CLARITY UR: CLEAR
CO2 SERPL-SCNC: 29 MMOL/L
COLOR UR: YELLOW
CREAT SERPL-MCNC: 0.8 MG/DL
DELSYS: ABNORMAL
DIFFERENTIAL METHOD: ABNORMAL
EOSINOPHIL # BLD AUTO: 0 K/UL
EOSINOPHIL NFR BLD: 0 %
ERYTHROCYTE [DISTWIDTH] IN BLOOD BY AUTOMATED COUNT: 17.5 %
EST. GFR  (AFRICAN AMERICAN): >60 ML/MIN/1.73 M^2
EST. GFR  (NON AFRICAN AMERICAN): >60 ML/MIN/1.73 M^2
FIO2: 32
FLOW: 3
GLUCOSE SERPL-MCNC: 218 MG/DL
GLUCOSE UR QL STRIP: NEGATIVE
HCO3 UR-SCNC: 33.2 MMOL/L (ref 24–28)
HCT VFR BLD AUTO: 36.4 %
HGB BLD-MCNC: 11.7 G/DL
HGB UR QL STRIP: ABNORMAL
KETONES UR QL STRIP: NEGATIVE
LEUKOCYTE ESTERASE UR QL STRIP: ABNORMAL
LYMPHOCYTES # BLD AUTO: 1 K/UL
LYMPHOCYTES NFR BLD: 6.6 %
MCH RBC QN AUTO: 27.4 PG
MCHC RBC AUTO-ENTMCNC: 32.2 G/DL
MCV RBC AUTO: 85 FL
MICROSCOPIC COMMENT: ABNORMAL
MODE: ABNORMAL
MONOCYTES # BLD AUTO: 1.4 K/UL
MONOCYTES NFR BLD: 9 %
NEUTROPHILS # BLD AUTO: 12.8 K/UL
NEUTROPHILS NFR BLD: 83.9 %
NITRITE UR QL STRIP: NEGATIVE
PCO2 BLDA: 51.9 MMHG (ref 35–45)
PH SMN: 7.41 [PH] (ref 7.35–7.45)
PH UR STRIP: 7 [PH] (ref 5–8)
PLATELET # BLD AUTO: 197 K/UL
PMV BLD AUTO: 8.8 FL
PO2 BLDA: 43 MMHG (ref 40–60)
POC BE: 9 MMOL/L
POC SATURATED O2: 78 % (ref 95–100)
POC TCO2: 35 MMOL/L (ref 24–29)
POCT GLUCOSE: 190 MG/DL (ref 70–110)
POCT GLUCOSE: 224 MG/DL (ref 70–110)
POTASSIUM SERPL-SCNC: 4 MMOL/L
PROT SERPL-MCNC: 7.2 G/DL
PROT UR QL STRIP: NEGATIVE
RBC # BLD AUTO: 4.27 M/UL
RBC #/AREA URNS HPF: 5 /HPF (ref 0–4)
SAMPLE: ABNORMAL
SITE: ABNORMAL
SODIUM SERPL-SCNC: 140 MMOL/L
SP GR UR STRIP: 1.01 (ref 1–1.03)
SP02: 97
SQUAMOUS #/AREA URNS HPF: 3 /HPF
URN SPEC COLLECT METH UR: ABNORMAL
UROBILINOGEN UR STRIP-ACNC: NEGATIVE EU/DL
WBC # BLD AUTO: 15.2 K/UL
WBC #/AREA URNS HPF: 8 /HPF (ref 0–5)

## 2017-10-11 PROCEDURE — 82962 GLUCOSE BLOOD TEST: CPT

## 2017-10-11 PROCEDURE — 96360 HYDRATION IV INFUSION INIT: CPT

## 2017-10-11 PROCEDURE — 93010 ELECTROCARDIOGRAM REPORT: CPT | Mod: ,,, | Performed by: INTERNAL MEDICINE

## 2017-10-11 PROCEDURE — 93005 ELECTROCARDIOGRAM TRACING: CPT

## 2017-10-11 PROCEDURE — 81000 URINALYSIS NONAUTO W/SCOPE: CPT

## 2017-10-11 PROCEDURE — 85025 COMPLETE CBC W/AUTO DIFF WBC: CPT

## 2017-10-11 PROCEDURE — 25000003 PHARM REV CODE 250: Performed by: EMERGENCY MEDICINE

## 2017-10-11 PROCEDURE — 36415 COLL VENOUS BLD VENIPUNCTURE: CPT

## 2017-10-11 PROCEDURE — 82803 BLOOD GASES ANY COMBINATION: CPT

## 2017-10-11 PROCEDURE — 87086 URINE CULTURE/COLONY COUNT: CPT

## 2017-10-11 PROCEDURE — 99900035 HC TECH TIME PER 15 MIN (STAT)

## 2017-10-11 PROCEDURE — 94761 N-INVAS EAR/PLS OXIMETRY MLT: CPT

## 2017-10-11 PROCEDURE — 99285 EMERGENCY DEPT VISIT HI MDM: CPT | Mod: 25

## 2017-10-11 PROCEDURE — 80053 COMPREHEN METABOLIC PANEL: CPT

## 2017-10-11 PROCEDURE — 82010 KETONE BODYS QUAN: CPT

## 2017-10-11 RX ORDER — ACETAMINOPHEN 325 MG/1
650 TABLET ORAL
Status: COMPLETED | OUTPATIENT
Start: 2017-10-11 | End: 2017-10-11

## 2017-10-11 RX ORDER — FUROSEMIDE 40 MG/1
TABLET ORAL
Qty: 10 TABLET | Refills: 0 | Status: SHIPPED | OUTPATIENT
Start: 2017-10-11 | End: 2017-10-25 | Stop reason: SDUPTHER

## 2017-10-11 RX ORDER — CEPHALEXIN 500 MG/1
500 CAPSULE ORAL 4 TIMES DAILY
Qty: 20 CAPSULE | Refills: 0 | Status: SHIPPED | OUTPATIENT
Start: 2017-10-11 | End: 2017-10-16

## 2017-10-11 RX ADMIN — SODIUM CHLORIDE 1000 ML: 0.9 INJECTION, SOLUTION INTRAVENOUS at 02:10

## 2017-10-11 RX ADMIN — ACETAMINOPHEN 650 MG: 325 TABLET, FILM COATED ORAL at 02:10

## 2017-10-11 NOTE — TELEPHONE ENCOUNTER
----- Message from Shira Aguirre sent at 10/11/2017  7:54 AM CDT -----  Patient requested refill on furosemide (LASIX) 40 MG tablet, she states she did not received her prescription form the mail order pharmacy they are resending but she is completely out, she is requesting a 5 day supply, 10 pills sent to [facility] pharmacy at  [phone]. Please call patient at  [phone] if you have any questions. Thank you.       [facility] [address] [facility] [place] [address]  [place] [address]  Phone: [phone] Fax: [phone]

## 2017-10-11 NOTE — ED PROVIDER NOTES
Encounter Date: 10/11/2017    SCRIBE #1 NOTE: I, Grace Kulkarni, am scribing for, and in the presence of, Dr. Ridley.       History     Chief Complaint   Patient presents with    Hyperglycemia     Bg 298 per EMS.     Headache       10/11/2017 2:52 PM     Chief complaint: Hyperglycemia       Nelly Tian is a 66 y.o. female with DM2, HTN, Afib, and CHF who presents to the ED via EMS with an onset of hyperglycemia with associated HA. She reports a CBG of 298 this morning despite being complaint with her Metformin. The patient states that she received 2 steroid injections in bilateral knee for OA during her appointment yesterday with her Orthopedist, Dr. Mehran Carrasco. She denies history of DKA or any other symptoms at this time. No pertinent SHx noted.       The history is provided by the patient and a relative (son).     Review of patient's allergies indicates:   Allergen Reactions    Dilaudid [hydromorphone] Anaphylaxis     Other reaction(s): Anaphylaxis  Other reaction(s): Unknown     Past Medical History:   Diagnosis Date    *Atrial flutter     Angina pectoris 9/18/2017    Anxiety     Arthritis     Asthma     Atrial fibrillation     Back pain     Cataract     OD    CHF (congestive heart failure)     COPD (chronic obstructive pulmonary disease)     Depression     Diabetes mellitus     Emphysema of lung     Heart failure     Hernia     Hypercapnic respiratory failure, chronic 11/16/2016    Hyperlipidemia     Hypertension     Iron deficiency anemia 2/3/2016    Myocardial infarction     Obesity     Peripheral vascular disease     Pneumonia     Polyneuropathy     Retinal detachment     OS    Septic shock 4/23/2017    Skin ulcer 3/18/2017    Tobacco dependence     Type II 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, not stated as uncontrolled(250.60)      Past Surgical History:   Procedure Laterality Date    BREAST BIOPSY Left 10 yrs ago    benign    CATARACT  EXTRACTION Left     OS      SECTION      x2    EYE SURGERY      HYSTERECTOMY      OOPHORECTOMY      one ovary conserved    RETINAL DETACHMENT SURGERY      buckle --OS    TONSILLECTOMY       Family History   Problem Relation Age of Onset    Arthritis Father     Heart disease Father     Early death Sister 30     heart disease    Heart disease Sister 32     MI    No Known Problems Brother     No Known Problems Daughter     Blindness Son      due to accident//    Arthritis Sister     Heart disease Sister     Crohn's disease Sister     Cancer Brother      asbestosis    Alcohol abuse Mother     Breast cancer Neg Hx     Glaucoma Neg Hx     Macular degeneration Neg Hx     Retinal detachment Neg Hx     Amblyopia Neg Hx     Diabetes Neg Hx     Cataracts Neg Hx     Hypertension Neg Hx     Strabismus Neg Hx     Stroke Neg Hx     Thyroid disease Neg Hx      Social History   Substance Use Topics    Smoking status: Current Every Day Smoker     Packs/day: 0.25     Years: 50.00     Types: Cigarettes     Start date: 1968    Smokeless tobacco: Former User     Quit date: 2017      Comment: 1 ppd for 20 years    Alcohol use No     Review of Systems   Constitutional: Negative for fever.        Positive for hyperglycemia.    HENT: Negative for sore throat.    Respiratory: Negative for shortness of breath.    Cardiovascular: Negative for chest pain.   Gastrointestinal: Negative for nausea.   Genitourinary: Negative for dysuria.   Musculoskeletal: Negative for back pain.   Skin: Negative for rash.   Neurological: Negative for weakness.   Hematological: Does not bruise/bleed easily.     Physical Exam     Initial Vitals [10/11/17 1356]   BP Pulse Resp Temp SpO2   (!) 142/65 (!) 51 20 98 °F (36.7 °C) 97 %      MAP       90.67         Physical Exam    Nursing note and vitals reviewed.  Constitutional: She appears well-developed and well-nourished. She is Obese .  Non-toxic appearance. No  distress.   HENT:   Head: Normocephalic and atraumatic.   Eyes: EOM are normal. Pupils are equal, round, and reactive to light.   Neck: Normal range of motion. Neck supple. No neck rigidity. No JVD present.   Cardiovascular: Normal rate, regular rhythm, normal heart sounds and intact distal pulses. Exam reveals no gallop and no friction rub.    No murmur heard.  Pulmonary/Chest: Breath sounds normal. She has no wheezes. She has no rhonchi. She has no rales.   Abdominal: Soft. Bowel sounds are normal. She exhibits no distension. There is no tenderness. There is no rigidity, no rebound and no guarding.   Musculoskeletal: Normal range of motion.   Neurological: She is alert and oriented to person, place, and time. She has normal strength and normal reflexes. No cranial nerve deficit or sensory deficit. She exhibits normal muscle tone. Coordination normal. GCS eye subscore is 4. GCS verbal subscore is 5. GCS motor subscore is 6.   Skin: Skin is warm and dry.   Psychiatric: She has a normal mood and affect. Her speech is normal and behavior is normal. She is not actively hallucinating.       ED Course   Procedures  Labs Reviewed   CBC W/ AUTO DIFFERENTIAL - Abnormal; Notable for the following:        Result Value    WBC 15.20 (*)     Hemoglobin 11.7 (*)     Hematocrit 36.4 (*)     RDW 17.5 (*)     MPV 8.8 (*)     Gran # 12.8 (*)     Mono # 1.4 (*)     Gran% 83.9 (*)     Lymph% 6.6 (*)     All other components within normal limits   COMPREHENSIVE METABOLIC PANEL - Abnormal; Notable for the following:     Glucose 218 (*)     Albumin 3.3 (*)     All other components within normal limits   URINALYSIS - Abnormal; Notable for the following:     Occult Blood UA 2+ (*)     Leukocytes, UA 2+ (*)     All other components within normal limits   URINALYSIS MICROSCOPIC - Abnormal; Notable for the following:     RBC, UA 5 (*)     WBC, UA 8 (*)     Bacteria, UA Few (*)     All other components within normal limits   ISTAT PROCEDURE -  Abnormal; Notable for the following:     POC PCO2 51.9 (*)     POC HCO3 33.2 (*)     POC SATURATED O2 78 (*)     POC TCO2 35 (*)     All other components within normal limits   POCT GLUCOSE - Abnormal; Notable for the following:     POCT Glucose 224 (*)     All other components within normal limits   POCT GLUCOSE - Abnormal; Notable for the following:     POCT Glucose 190 (*)     All other components within normal limits   BETA - HYDROXYBUTYRATE, SERUM     Imaging Results          X-Ray Chest AP Portable (Final result)  Result time 10/11/17 14:35:14    Final result by Geraldo Peterson MD (10/11/17 14:35:14)                 Impression:      1.  Cardiomegaly and mild bilateral pulmonary edema/CHF pattern, similar when compared to the 9/17/17 examination.        Electronically signed by: Geraldo Peterson MD  Date:     10/11/17  Time:    14:35              Narrative:    Comparison: 9/17/17    Technique: Single AP portable chest radiograph.    Findings:Cardiac size is magnified by AP technique. It appears enlarged. There are increased interstitial markings in each lung with central predominance and cephalization of the pulmonary vasculature.. Resolution is limited by large patient body habitus. There is atherosclerosis. No definite pleural effusion or pneumothorax on this single view. Left hemidiaphragm is not well-seen.                            EKG Readings: (Independently Interpreted)   Initial Reading: No STEMI.   Atrial fibrillation at a rib of 56 bpm with a right axis deviation.      X-Rays:   Independently Interpreted Readings:   Chest X-Ray: CHF.      Medical Decision Making:   History:   Old Medical Records: I decided to obtain old medical records.  Initial Assessment:   66-year-old woman presents for evaluation of hyperglycemia.  Only complaint is mild dysuria and mild headache.  Patient has had blood sugars in 300 home.  No nausea or vomiting, diarrhea.  Glucose in the emergency department is 224.  VBG  shows a pH of 7.4.  CO2 29.  No evidence of diabetic ketoacidosis.  Patient had steroid injections in her knee yesterday which are likely the source of her hyperglycemia.  She is noted to have an abnormal urinalysis that was nitrite negative but had few bacteria with 8 wbc's and 5 rbc's suspicious for mild UTI.  She will be treated with Keflex.  Urine culture obtained.  Patient will take extra dose of metformin for the next couple of days and monitor her sugars closely.  She is discharged improved in no acute distress to follow-up with her PCP as needed.  Return precautions were discussed.  Clinical Tests:   Lab Tests: Reviewed and Ordered  Radiological Study: Ordered and Reviewed  Medical Tests: Reviewed and Ordered            Scribe Attestation:   Scribe #1: I performed the above scribed service and the documentation accurately describes the services I performed. I attest to the accuracy of the note.      I, Khai Dai, personally performed the services described in this documentation. All medical record entries made by the scribe were at my direction and in my presence.  I have reviewed the chart and agree that the record reflects my personal performance and is accurate and complete. Lex Ridley MD.  9:09 PM 10/11/2017          ED Course      Clinical Impression:     1. Acute cystitis without hematuria    2. Hyperglycemia          Disposition:   Disposition: Discharged  Condition: Stable                        Lex Ridley MD  10/11/17 7548

## 2017-10-11 NOTE — ED NOTES
Assumed care from:  Patient awake, alert and oriented x 3, skin warm and dry, in NAD.  Patient had bilateral knee injections yesterday.  Today blood sugar elevated in the 300's

## 2017-10-11 NOTE — TELEPHONE ENCOUNTER
Spoke to patient's daughter (Maye) regarding patient's increased blood sugar readings. States patient had not gone to ER that she was advised to call office for MD notification. Writer advised Mrs. Villavicencio patient should be seen in ER, advised Dr. Bird is out of office but would be notified of patient's status. Verbalized understanding.

## 2017-10-11 NOTE — TELEPHONE ENCOUNTER
----- Message from Trish Hays sent at 10/11/2017 12:20 PM CDT -----  Contact: Maye  Patient's daughter is asking to speak to nurse regarding patient's sugar level last night was 297; this morning was 300; stated called ER and was told to come on in but to let Dr Bird know of situation. Please call [phone]. Thanks!

## 2017-10-12 ENCOUNTER — OUTPATIENT CASE MANAGEMENT (OUTPATIENT)
Dept: ADMINISTRATIVE | Facility: OTHER | Age: 67
End: 2017-10-12

## 2017-10-12 RX ORDER — LISINOPRIL 20 MG/1
TABLET ORAL
Qty: 90 TABLET | Refills: 1 | Status: SHIPPED | OUTPATIENT
Start: 2017-10-12 | End: 2017-10-31 | Stop reason: SDUPTHER

## 2017-10-12 RX ORDER — TEMAZEPAM 15 MG/1
CAPSULE ORAL
Qty: 90 CAPSULE | Refills: 0 | OUTPATIENT
Start: 2017-10-12

## 2017-10-12 NOTE — PROGRESS NOTES
10/12/17-Called and spoke to patient. Patient went to the ER yesterday because she received two steroid shots and her blood glucose jumped to almost 300. Patient states that it scared her. Educated patient on steroids will make your blood glucose rise and then return to normal. Patient is taking the extra metformin prescribed by the ER doctor for several days. Her blood glucose was 124 today. Patient is taking her blood pressure.Patient is using her oxygen as needed. Went over educational material with patient on COPD. Patient was given keflex for a UTI. Went over signs and symptoms of UTI with patient. Sending patient another pill box. Will follow up with patient in two weeks.

## 2017-10-13 LAB
BACTERIA UR CULT: NORMAL
BACTERIA UR CULT: NORMAL

## 2017-10-17 ENCOUNTER — TELEPHONE (OUTPATIENT)
Dept: FAMILY MEDICINE | Facility: CLINIC | Age: 67
End: 2017-10-17

## 2017-10-17 ENCOUNTER — TELEPHONE (OUTPATIENT)
Dept: CARDIOLOGY | Facility: CLINIC | Age: 67
End: 2017-10-17

## 2017-10-17 DIAGNOSIS — J44.9 CHRONIC OBSTRUCTIVE PULMONARY DISEASE, UNSPECIFIED COPD TYPE: Primary | ICD-10-CM

## 2017-10-17 NOTE — TELEPHONE ENCOUNTER
Patient states her nebulizer is no longer working. Requesting to know if she is eligible for a new machine. Please advise.

## 2017-10-17 NOTE — TELEPHONE ENCOUNTER
----- Message from Shelia Smart sent at 10/17/2017 10:32 AM CDT -----  Contact: [facility]  Patient saw the doctor earlier this month and he gave her a eloquist lood thinner but she has not started it yet because she forgot what how he told her to take it.  Please call [phone].  Thank you!

## 2017-10-17 NOTE — TELEPHONE ENCOUNTER
----- Message from Trish Tapia sent at 10/17/2017  3:25 PM CDT -----  Contact: self  Patient [phone] is asking if the nurse could check to see if she is eligible for a new breathing machine as her machine has quit/please call patient to discuss

## 2017-10-17 NOTE — TELEPHONE ENCOUNTER
Spoke with pt. Pt stated she wasn't sure how to take her Eliquis. Informed pt she is to take her Eliquis twice a day. Informed pt her INR has to be 2.0 to start her Eliquis. Pt verbalized understanding. No further issues discussed.

## 2017-10-18 RX ORDER — TEMAZEPAM 15 MG/1
CAPSULE ORAL
Qty: 90 CAPSULE | Refills: 0 | OUTPATIENT
Start: 2017-10-18

## 2017-10-19 NOTE — TELEPHONE ENCOUNTER
Patient notified refill request for Restoril was denied due to patient taking Klonopin, Norco, and Desyrel. Patient verbalized understanding.

## 2017-10-24 ENCOUNTER — DOCUMENTATION ONLY (OUTPATIENT)
Dept: FAMILY MEDICINE | Facility: CLINIC | Age: 67
End: 2017-10-24

## 2017-10-24 NOTE — TELEPHONE ENCOUNTER
Spoke with patient about providers recommendations;patient has appointment on 10/25/17; patient did change filter ,but will have someone else look at.

## 2017-10-25 ENCOUNTER — OFFICE VISIT (OUTPATIENT)
Dept: HEMATOLOGY/ONCOLOGY | Facility: CLINIC | Age: 67
End: 2017-10-25
Payer: MEDICARE

## 2017-10-25 ENCOUNTER — OFFICE VISIT (OUTPATIENT)
Dept: FAMILY MEDICINE | Facility: CLINIC | Age: 67
End: 2017-10-25
Payer: MEDICARE

## 2017-10-25 VITALS
OXYGEN SATURATION: 99 % | HEIGHT: 68 IN | SYSTOLIC BLOOD PRESSURE: 152 MMHG | BODY MASS INDEX: 43.53 KG/M2 | DIASTOLIC BLOOD PRESSURE: 72 MMHG | WEIGHT: 287.25 LBS | HEART RATE: 65 BPM

## 2017-10-25 VITALS
DIASTOLIC BLOOD PRESSURE: 59 MMHG | RESPIRATION RATE: 18 BRPM | HEIGHT: 68 IN | BODY MASS INDEX: 44.41 KG/M2 | WEIGHT: 293 LBS | SYSTOLIC BLOOD PRESSURE: 141 MMHG | HEART RATE: 75 BPM

## 2017-10-25 DIAGNOSIS — R16.0 HEPATOMEGALY: ICD-10-CM

## 2017-10-25 DIAGNOSIS — M43.12 SPONDYLOLISTHESIS OF CERVICAL REGION: ICD-10-CM

## 2017-10-25 DIAGNOSIS — I50.22 CHRONIC SYSTOLIC CONGESTIVE HEART FAILURE: ICD-10-CM

## 2017-10-25 DIAGNOSIS — N39.0 RECURRENT UTI: Primary | ICD-10-CM

## 2017-10-25 DIAGNOSIS — I73.9 PVD (PERIPHERAL VASCULAR DISEASE): ICD-10-CM

## 2017-10-25 DIAGNOSIS — E11.40 TYPE 2 DIABETES MELLITUS WITH DIABETIC NEUROPATHY, WITHOUT LONG-TERM CURRENT USE OF INSULIN: Chronic | ICD-10-CM

## 2017-10-25 DIAGNOSIS — M51.36 LUMBAR DEGENERATIVE DISC DISEASE: ICD-10-CM

## 2017-10-25 DIAGNOSIS — D72.829 LEUKOCYTOSIS, UNSPECIFIED TYPE: ICD-10-CM

## 2017-10-25 DIAGNOSIS — D50.9 IRON DEFICIENCY ANEMIA, UNSPECIFIED IRON DEFICIENCY ANEMIA TYPE: Primary | ICD-10-CM

## 2017-10-25 DIAGNOSIS — Z51.89 ENCOUNTER FOR PATIENT COMPLIANCE MONITORING IN DRUG TREATMENT PROGRAM: ICD-10-CM

## 2017-10-25 LAB
AMPHET+METHAMPHET UR QL: NEGATIVE
BARBITURATES UR QL SCN>200 NG/ML: NEGATIVE
BENZODIAZ UR QL SCN>200 NG/ML: NEGATIVE
BZE UR QL SCN: NEGATIVE
CANNABINOIDS UR QL SCN: NEGATIVE
CREAT UR-MCNC: 82 MG/DL
ETHANOL UR-MCNC: <10 MG/DL
METHADONE UR QL SCN>300 NG/ML: NEGATIVE
OPIATES UR QL SCN: NORMAL
PCP UR QL SCN>25 NG/ML: NEGATIVE
TOXICOLOGY INFORMATION: NORMAL

## 2017-10-25 PROCEDURE — 99499 UNLISTED E&M SERVICE: CPT | Mod: S$GLB,,, | Performed by: FAMILY MEDICINE

## 2017-10-25 PROCEDURE — G0008 ADMIN INFLUENZA VIRUS VAC: HCPCS | Mod: S$GLB,,, | Performed by: FAMILY MEDICINE

## 2017-10-25 PROCEDURE — 99213 OFFICE O/P EST LOW 20 MIN: CPT | Mod: S$GLB,,, | Performed by: INTERNAL MEDICINE

## 2017-10-25 PROCEDURE — 99999 PR PBB SHADOW E&M-EST. PATIENT-LVL III: CPT | Mod: PBBFAC,,, | Performed by: FAMILY MEDICINE

## 2017-10-25 PROCEDURE — 99214 OFFICE O/P EST MOD 30 MIN: CPT | Mod: S$GLB,,, | Performed by: FAMILY MEDICINE

## 2017-10-25 PROCEDURE — 99999 PR PBB SHADOW E&M-EST. PATIENT-LVL IV: CPT | Mod: PBBFAC,,, | Performed by: INTERNAL MEDICINE

## 2017-10-25 PROCEDURE — 80307 DRUG TEST PRSMV CHEM ANLYZR: CPT

## 2017-10-25 PROCEDURE — 90662 IIV NO PRSV INCREASED AG IM: CPT | Mod: S$GLB,,, | Performed by: FAMILY MEDICINE

## 2017-10-25 PROCEDURE — 99499 UNLISTED E&M SERVICE: CPT | Mod: S$GLB,,, | Performed by: INTERNAL MEDICINE

## 2017-10-25 RX ORDER — CLONAZEPAM 1 MG/1
TABLET ORAL
Qty: 30 TABLET | Refills: 3 | Status: SHIPPED | OUTPATIENT
Start: 2017-10-25 | End: 2018-03-06 | Stop reason: SDUPTHER

## 2017-10-25 RX ORDER — FUROSEMIDE 40 MG/1
TABLET ORAL
Qty: 60 TABLET | Refills: 5 | Status: SHIPPED | OUTPATIENT
Start: 2017-10-25 | End: 2018-06-23 | Stop reason: SDUPTHER

## 2017-10-25 RX ORDER — CEPHALEXIN 500 MG/1
500 CAPSULE ORAL EVERY 12 HOURS
COMMUNITY
End: 2018-01-16 | Stop reason: SDUPTHER

## 2017-10-25 RX ORDER — TEMAZEPAM 30 MG/1
CAPSULE ORAL
COMMUNITY
Start: 2017-10-15 | End: 2017-10-25

## 2017-10-25 RX ORDER — HYDROCODONE BITARTRATE AND ACETAMINOPHEN 10; 325 MG/1; MG/1
1 TABLET ORAL EVERY 8 HOURS PRN
Qty: 90 TABLET | Refills: 0 | Status: SHIPPED | OUTPATIENT
Start: 2017-12-07 | End: 2017-12-19 | Stop reason: ALTCHOICE

## 2017-10-25 RX ORDER — HYDROCODONE BITARTRATE AND ACETAMINOPHEN 10; 325 MG/1; MG/1
1 TABLET ORAL EVERY 8 HOURS PRN
Qty: 90 TABLET | Refills: 0 | Status: SHIPPED | OUTPATIENT
Start: 2018-01-08 | End: 2017-12-19 | Stop reason: ALTCHOICE

## 2017-10-25 RX ORDER — GABAPENTIN 600 MG/1
TABLET ORAL
COMMUNITY
Start: 2017-10-20 | End: 2017-10-25

## 2017-10-25 RX ORDER — POLYETHYLENE GLYCOL 3350 17 G/17G
17 POWDER, FOR SOLUTION ORAL DAILY
Qty: 1530 G | Refills: 11 | Status: SHIPPED | OUTPATIENT
Start: 2017-10-25 | End: 2017-11-17 | Stop reason: SDUPTHER

## 2017-10-25 RX ORDER — HYDROCODONE BITARTRATE AND ACETAMINOPHEN 10; 325 MG/1; MG/1
1 TABLET ORAL EVERY 8 HOURS PRN
Qty: 90 TABLET | Refills: 0 | Status: SHIPPED | OUTPATIENT
Start: 2017-11-07 | End: 2018-01-29 | Stop reason: SDUPTHER

## 2017-10-25 NOTE — LETTER
"2017    Nelly Tian  [address] [place] [id number]             [address] [address]  Phone: [phone]  Fax: [phone]   2017    Re: Nelly Tian        : 1950        MRN: [id number]    PAIN ORIENTATION AGREEMENT    My doctor and I have decided that as part of my treatment for chronic pain, I will receive prescriptions for controlled substances.  As a patient, I will agree to the following terms in order for my provider to effectively treat my pain and also comply with the rules set forth by [facility] Board of Medical Examiners and Drug Enforcement Agency.  1. I understand that in order for me to receive the best possible care, my pain management doctor needs a copy of any previous medical records, including MRI, office notes, lab results, etc.  2. I will provide a full list of my medications, current dose, and how often I take my medication.  3. A single physician shall be responsible for prescribing my pain medication.  4. I understand that my physician may require an office visit every 3 months for certain controlled substances.  5. It is my responsibility to keep my appointments.  If I miss my schedule appointment, I will be rescheduled to the first available time slot.  I understand that pain medications may not be refilled until seen.  6. If my doctor agrees to refill my pain medication by telephone, I will call the office at least 5 business days in advance to request a refill prescription.  7. Refill prescriptions will not be written in the evenings, weekends, or on holidays.  8. Each prescription is expected to last at least one month.  Refills will not be given early if I "run out early", "lose a prescription", "spill" or "misplaced" my medication.  9. It may be necessary for prescriptions to be picked up in person and proof of identification may be required.  10. I will have my pain medication filled at " only one pharmacy.                 [facility] [address] [place], [place] - [address]  [address] [facility] [address]  Phone: [phone] Fax: [phone]    [facility] - [place], [place] - [address] [facility]  [address] [facility] [address]  Phone: [phone] Fax: [phone]    [facility] and [place], [place] - [address] [facility] [address]  [address] [facility] [address]  Phone: [phone] Fax: [phone]    [facility] ([facility] - [facility] [address]  [address]  [address]  [address]  [facility] [address]  Phone: [phone] Fax: [phone]    [facility] - [place], [place] - [address] "[employer]  [address] "[employer]"  [facility] [address]  Phone: [phone] Fax: [phone]    [facility] [place], [place] - [address]  [address]  [facility] [address]  Phone: [phone] Fax: [phone]         11. A baseline drug screen may be completed on my first visit and or randomly at other routine clinic visits.      I have read and understand the above information.  I will, to the best of my ability, adhere to these policies and commitments.  I further understand that noncompliance with these policies may result in my being discharged as the patient.      _____________________                     _____Nelly Tian______  Patient signature/date         Patient printed  name                                                                                  _____________________                    ____________________  Physician signature/printed name/date         Witness/date    Constantine Bird MD 10/25/2017                BEHAVIOR AGREEMENT FOR THE USE OF CONTROLLED DRUGS  The following has been explained to me:  1. It is possible that I may become physically dependent, psychologically dependent, tolerant and/or addicted to controlled substance medications.   2. Physical dependence occurs if withdrawal symptoms are experienced when  "the drug is suddenly discontinued.  3. Tolerance is the need for higher doses of the drug to achieve the same amount of pain control.  4. Addition is a psychological and behavioral syndrome that is recognized when the patient abuses the drug to obtain mental numbness or euphoria (get high), or shows drug craving behavior or manipulative attitude toward the physician in order to obtain the drug.  5. Withdrawal symptoms may occur if pain medication is stopped abruptly.   These symptoms include yawning, sweating, watery eyes, runny nose, anxiety, tremors, achy muscles, hot and cold flashes, "gooseflesh ", abdominal cramps or diarrhea.  6. I will not cut or chew long-acting pain medication.  7. If severe sedation (sleepiness) or any other medical emergency relating to my pain medication occurs, I will contact my doctor's office or seek ER attention immediately.  8. I will not combine these drugs with alcohol or recreational drugs (this includes marijuana).     9. I must inform my doctor if I am taking any other sedating drugs such as Valium, Ativan, seizure medication or psychiatric drugs.    10. I will inform my physician of any current or prior history of drug abuse or prescription medication misuse.  11. These medications may be harmful to an unborn child.  I have been advised to use 2 forms of birth control (at least one barrier, such as condoms) while using these medications.    12. If I test positive for drugs that my doctor has not prescribed, and/or if I refuses a random drug test, my physician has the right to stop my controlled substance, end  his/her relationship with me, and I may be terminated from the clinic.   13. If at any time I become violent or abusive, verbally or physically, my actions will be considered cause to terminate care from the clinic and discontinuation of pain medications.          I understand and agree that if I fail to abide by the above agreements, or if I show signs suspicious of " narcotic over use or abuse, my pain management physician may discontinue treatment, and narcotic prescriptions will be discontinued.            _____________________                     _____Nelly Tian______  Patient signature/date          Patient printed  name                                                                                _____________________                    ____________________  Physician signature/printed name/date           Witness/date    Constantine Bird MD 10/25/2017

## 2017-10-25 NOTE — PROGRESS NOTES
FOLLOWUP    CHIEF COMPLAINT: I am tired    Ms. Tian is a 66-year-old female who has a history of progressive anemia after    IV iron.      Pt recently had injections into her knee for arthralgias  She developed elevated glucose and a UTI  SHe completed antibiotics and feels well  Here today for routine labs   She is tolerating Glucophage for diabetes as well as Lexapro for depression and Zestril   for hypertension  Trazodone helping her sleep    Past Surgical History:   Procedure Laterality Date    BREAST BIOPSY Left 10 yrs ago    benign    CATARACT EXTRACTION Left     OS      SECTION      x2    EYE SURGERY      HYSTERECTOMY      OOPHORECTOMY      one ovary conserved    RETINAL DETACHMENT SURGERY      buckle --OS    TONSILLECTOMY         Current Outpatient Prescriptions:     albuterol-ipratropium 2.5mg-0.5mg/3mL (DUO-NEB) 0.5 mg-3 mg(2.5 mg base)/3 mL nebulizer solution, INHALE THE CONTENTS OF 1 VIAL VIA NEBULIZER EVERY 6 HOURS AS NEEDED FOR  WHEEZING (DO NOT USE WITH ALBUTEROL INHALER), Disp: 90 mL, Rfl: 4    ANORO ELLIPTA 62.5-25 mcg/actuation DsDv, INHALE 1 PUFF INTO THE LUNGS ONCE DAILY AT 6AM. CONTROLLER, Disp: 60 each, Rfl: 3    apixaban 5 mg Tab, Take 1 tablet (5 mg total) by mouth 2 (two) times daily., Disp: 90 tablet, Rfl: 3    ascorbic acid, vitamin C, (VITAMIN C) 500 MG tablet, Take 1 tablet (500 mg total) by mouth every evening., Disp: , Rfl:     atorvastatin (LIPITOR) 20 MG tablet, TAKE 1 TABLET (20 MG TOTAL) BY MOUTH ONCE DAILY., Disp: 90 tablet, Rfl: 3    budesonide (PULMICORT) 0.5 mg/2 mL nebulizer solution, , Disp: , Rfl:     cephALEXin (KEFLEX) 500 MG capsule, Take 500 mg by mouth 4 (four) times daily., Disp: , Rfl:     clonazePAM (KLONOPIN) 1 MG tablet, Take 1 tablet (1 mg total) by mouth 2 (two) times daily as needed., Disp: 60 tablet, Rfl: 4    clonazePAM (KLONOPIN) 1 MG tablet, TAKE ONE TABLET BY MOUTH TWICE DAILY AS NEEDED FOR ANXIETY, Disp: 60 tablet, Rfl: 0     furosemide (LASIX) 40 MG tablet, TAKE ONE TABLET ONCE DAILY FOR FLUID OVERLOAD, Disp: 10 tablet, Rfl: 0    gabapentin (NEURONTIN) 800 MG tablet, Take 1 tablet (800 mg total) by mouth 3 (three) times daily., Disp: 270 tablet, Rfl: 3    hydrocodone-acetaminophen 10-325mg (NORCO)  mg Tab, Take 1 tablet by mouth every 6 (six) hours as needed for Pain., Disp: 120 tablet, Rfl: 0    hydrocodone-acetaminophen 10-325mg (NORCO)  mg Tab, Take 1 tablet by mouth every 6 (six) hours as needed for Pain., Disp: 120 tablet, Rfl: 0    hydrocodone-acetaminophen 10-325mg (NORCO)  mg Tab, Take 1 tablet by mouth every 6 (six) hours as needed for Pain., Disp: 120 tablet, Rfl: 0    levalbuterol (XOPENEX) 0.63 mg/3 mL nebulizer solution, INHALE THE CONTENTS OF 1 VIAL BY NEBULIZATION 3 (THREE) TIMES DAILY., Disp: 792 mL, Rfl: 3    lisinopril (PRINIVIL,ZESTRIL) 20 MG tablet, TAKE 1 TABLET EVERY DAY, Disp: 90 tablet, Rfl: 1    magnesium oxide (MAG-OX) 400 mg tablet, Take 1 tablet (400 mg total) by mouth 2 (two) times daily., Disp: , Rfl: 0    metformin (GLUCOPHAGE) 500 MG tablet, TAKE 1 TABLET TWICE DAILY WITH MEALS, Disp: 180 tablet, Rfl: 3    metoprolol succinate (TOPROL-XL) 25 MG 24 hr tablet, TAKE 1/2 TABLET EVERY DAY, Disp: 45 tablet, Rfl: 3    multivitamin (THERAGRAN) tablet, Take 1 tablet by mouth once daily., Disp: , Rfl:     nystatin (MYCOSTATIN) powder, Apply topically 4 (four) times daily., Disp: 120 g, Rfl: 3    nystatin-triamcinolone (MYCOLOG II) cream, Apply 1 gm to affected are no more than twice a day for only 2 weeks at a time., Disp: 45 g, Rfl: 1    polyethylene glycol (GLYCOLAX) 17 gram/dose powder, MIX 1 CAPFUL (17GM) IN LIQUID AND DRINK BEFORE BREAKFAST, Disp: 1530 g, Rfl: 0    potassium chloride SA (K-DUR,KLOR-CON) 20 MEQ tablet, Take 1 tablet (20 mEq total) by mouth once daily., Disp: 30 tablet, Rfl: 6    trazodone (DESYREL) 150 MG tablet, Take 0.5 tablets (75 mg total) by mouth every  "evening., Disp: 90 tablet, Rfl: 3    TRUE METRIX GLUCOSE TEST STRIP Strp, TEST BLOOD SUGAR THREE TIMES DAILY, Disp: 300 strip, Rfl: 3    VENTOLIN HFA 90 mcg/actuation inhaler, INHALE TWO PUFFS EVERY 6 HOURS AS NEEDED FOR WHEEZING, Disp: 18 g, Rfl: 0    REVIEW OF SYSTEMS:  GENERAL:  No progression of fatigue nor SOB  She is obese.  Difficulty   walking, extreme knee pain.  Gait instability only due to knees   Depression.  No fever or   chills.  No unexpected change in weight.  HEENT:  No photophobia, rhinorrhea  RESPIRATORY:  No cough or wheezing.  CARDIOVASCULAR:  No chest pain, palpitations  GASTROINTESTINAL:  No abdominal pain, dysphagia, emesis, diarrhea, or   constipation.  No heartburn, abdominal distention  GENITOURINARY:  No urinary frequency, hesitancy  RHEUM:  ++  arthralgias or joint swelling.  MUSCOLSKELETAL:  No neck pain, back pain, positive  arthralgias  NEUROLOGICAL:  No headaches, positive dizziness, + paresthesias  PSYCH:  No agitation, change in behavior, or anxiety.  ENDOCRINE:  No hot or cold intolerance.    PHYSICAL EXAMINATION:  BP (!) 141/59   Pulse 75   Resp 18   Ht 5' 8" (1.727 m)   Wt (!) 139.6 kg (307 lb 12.2 oz)   BMI 46.80 kg/m²     GENERAL:  She is obese.  She is oriented, well developed, well nourished.  PSYCH:  Pleasant affect.  No anxiety or depression.  HEENT:  Normocephalic.  Lids intact, conjunctivae pink.  Sinuses nontender to   palpation.  OP clear, no palatal pallor.  NECK:  Supple.  Trachea midline.  No palpable abnormalities.  CHEST:  No use of accessory muscles during respiration.  ABDOMEN:  NT, ND.  Normal bowel sounds.  No palpable HSM or mass.  EXTREMITIES:  Legs, 1+ pitting edema.  Evidence of chronic venous insufficiency stable  NEUROLOGICAL:  Alert and oriented x 3.  Cranial nerves grossly intact.  SKIN:  Warm, dry.  Ecchymosis evident.  No tenting, petechiae    LABS:      Lab Results   Component Value Date    WBC 15.20 (H) 10/11/2017    HGB 11.7 (L) 10/11/2017 "    HCT 36.4 (L) 10/11/2017    MCV 85 10/11/2017     10/11/2017       IMPRESSION AND PLAN:        Iron deficiency anemia, unspecified iron deficiency anemia type  -     CBC auto differential; Future; Expected date: 10/25/2017  -     Iron and TIBC; Future; Expected date: 10/25/2017  -     Ferritin; Future; Expected date: 10/25/2017    BMI 36.0-36.9,adult    Type 2 diabetes mellitus with diabetic neuropathy, without long-term current use of insulin    Hepatomegaly    PVD (peripheral vascular disease)    Leukocytosis, unspecified type      RTC 2 months with iron levels and cbc   May need IV iron  Currently counts stable  For hernia repair hopefully  Cont lipitor to prevent plaques  Cont lasix to help with edema  Watch for peripheral destruction of cells with hepatomegaly    Current Outpatient Prescriptions:     albuterol-ipratropium 2.5mg-0.5mg/3mL (DUO-NEB) 0.5 mg-3 mg(2.5 mg base)/3 mL nebulizer solution, INHALE THE CONTENTS OF 1 VIAL VIA NEBULIZER EVERY 6 HOURS AS NEEDED FOR  WHEEZING (DO NOT USE WITH ALBUTEROL INHALER), Disp: 90 mL, Rfl: 4    ANORO ELLIPTA 62.5-25 mcg/actuation DsDv, INHALE 1 PUFF INTO THE LUNGS ONCE DAILY AT 6AM. CONTROLLER, Disp: 60 each, Rfl: 3    apixaban 5 mg Tab, Take 1 tablet (5 mg total) by mouth 2 (two) times daily., Disp: 90 tablet, Rfl: 3    ascorbic acid, vitamin C, (VITAMIN C) 500 MG tablet, Take 1 tablet (500 mg total) by mouth every evening., Disp: , Rfl:     atorvastatin (LIPITOR) 20 MG tablet, TAKE 1 TABLET (20 MG TOTAL) BY MOUTH ONCE DAILY., Disp: 90 tablet, Rfl: 3    budesonide (PULMICORT) 0.5 mg/2 mL nebulizer solution, , Disp: , Rfl:     cephALEXin (KEFLEX) 500 MG capsule, Take 500 mg by mouth 4 (four) times daily., Disp: , Rfl:     clonazePAM (KLONOPIN) 1 MG tablet, Take 1 tablet (1 mg total) by mouth 2 (two) times daily as needed., Disp: 60 tablet, Rfl: 4    clonazePAM (KLONOPIN) 1 MG tablet, TAKE ONE TABLET BY MOUTH TWICE DAILY AS NEEDED FOR ANXIETY, Disp: 60  tablet, Rfl: 0    furosemide (LASIX) 40 MG tablet, TAKE ONE TABLET ONCE DAILY FOR FLUID OVERLOAD, Disp: 10 tablet, Rfl: 0    gabapentin (NEURONTIN) 800 MG tablet, Take 1 tablet (800 mg total) by mouth 3 (three) times daily., Disp: 270 tablet, Rfl: 3    hydrocodone-acetaminophen 10-325mg (NORCO)  mg Tab, Take 1 tablet by mouth every 6 (six) hours as needed for Pain., Disp: 120 tablet, Rfl: 0    hydrocodone-acetaminophen 10-325mg (NORCO)  mg Tab, Take 1 tablet by mouth every 6 (six) hours as needed for Pain., Disp: 120 tablet, Rfl: 0    hydrocodone-acetaminophen 10-325mg (NORCO)  mg Tab, Take 1 tablet by mouth every 6 (six) hours as needed for Pain., Disp: 120 tablet, Rfl: 0    levalbuterol (XOPENEX) 0.63 mg/3 mL nebulizer solution, INHALE THE CONTENTS OF 1 VIAL BY NEBULIZATION 3 (THREE) TIMES DAILY., Disp: 792 mL, Rfl: 3    lisinopril (PRINIVIL,ZESTRIL) 20 MG tablet, TAKE 1 TABLET EVERY DAY, Disp: 90 tablet, Rfl: 1    magnesium oxide (MAG-OX) 400 mg tablet, Take 1 tablet (400 mg total) by mouth 2 (two) times daily., Disp: , Rfl: 0    metformin (GLUCOPHAGE) 500 MG tablet, TAKE 1 TABLET TWICE DAILY WITH MEALS, Disp: 180 tablet, Rfl: 3    metoprolol succinate (TOPROL-XL) 25 MG 24 hr tablet, TAKE 1/2 TABLET EVERY DAY, Disp: 45 tablet, Rfl: 3    multivitamin (THERAGRAN) tablet, Take 1 tablet by mouth once daily., Disp: , Rfl:     nystatin (MYCOSTATIN) powder, Apply topically 4 (four) times daily., Disp: 120 g, Rfl: 3    nystatin-triamcinolone (MYCOLOG II) cream, Apply 1 gm to affected are no more than twice a day for only 2 weeks at a time., Disp: 45 g, Rfl: 1    polyethylene glycol (GLYCOLAX) 17 gram/dose powder, MIX 1 CAPFUL (17GM) IN LIQUID AND DRINK BEFORE BREAKFAST, Disp: 1530 g, Rfl: 0    potassium chloride SA (K-DUR,KLOR-CON) 20 MEQ tablet, Take 1 tablet (20 mEq total) by mouth once daily., Disp: 30 tablet, Rfl: 6    trazodone (DESYREL) 150 MG tablet, Take 0.5 tablets (75 mg total)  by mouth every evening., Disp: 90 tablet, Rfl: 3    TRUE METRIX GLUCOSE TEST STRIP Strp, TEST BLOOD SUGAR THREE TIMES DAILY, Disp: 300 strip, Rfl: 3    VENTOLIN HFA 90 mcg/actuation inhaler, INHALE TWO PUFFS EVERY 6 HOURS AS NEEDED FOR WHEEZING, Disp: 18 g, Rfl: 0      She is to continue Coumadin for chronic atrial fibrillation as well as her diabetic medication to help control hyperglycemia due to diabetes

## 2017-10-25 NOTE — PROGRESS NOTES
Subjective:       Patient ID: Nelly Tian is a 66 y.o. female.    Chief Complaint: diabetes, hypertension follow up    Patient Active Problem List:     Diabetes mellitus with neuropathy     Long term current use of anticoagulant therapy     Arthritis     Major depression, recurrent, chronic     DM II (diabetes mellitus, type II), controlled     BMI 36.0-36.9,adult     GERD (gastroesophageal reflux disease)     Tobacco dependency     Asthma     Chronic atrial fibrillation     Hypertension associated with diabetes     Morbid obesity with BMI of 45.0-49.9, adult     PVD (peripheral vascular disease)     Abdominal aortic atherosclerosis     Dependency on pain medication     History of MI (myocardial infarction)     Iron deficiency anemia     Hepatomegaly     DDD (degenerative disc disease), lumbar     Anxiety     Type 2 diabetes mellitus with complication, without long-term current use of insulin     Hyperlipidemia     NSAID long-term use     Osteoarthritis of right hip     Mixed restrictive and obstructive lung disease     Hypercapnic respiratory failure, chronic     Chronic respiratory failure with hypoxia     Obesity, Class III, BMI 40-49.9 (morbid obesity)     Yeast dermatitis     Nonhealing skin ulcer, limited to breakdown of skin     Pressure ulcer, stage 3     Infected wound     COPD (chronic obstructive pulmonary disease)     Microcytic anemia     Cellulitis, abdominal wall     Constipation     Chronic respiratory failure     Diabetes mellitus type 2 in obese          Hypertension   This is a chronic problem. The problem has been resolved since onset. The problem is controlled. Associated symptoms include anxiety, orthopnea, peripheral edema and shortness of breath. Pertinent negatives include no blurred vision, chest pain, headaches or palpitations. Agents associated with hypertension include NSAIDs. Risk factors for coronary artery disease include obesity, sedentary lifestyle and post-menopausal state.  The current treatment provides moderate improvement. Hypertensive end-organ damage includes heart failure.   Diabetes   Pertinent negatives for hypoglycemia include no dizziness or headaches. Pertinent negatives for diabetes include no blurred vision, no chest pain and no fatigue.     Review of Systems   Constitutional: Negative for fatigue and unexpected weight change.   Eyes: Negative for blurred vision.   Respiratory: Positive for shortness of breath. Negative for chest tightness.    Cardiovascular: Positive for orthopnea. Negative for chest pain, palpitations and leg swelling.   Gastrointestinal: Negative for abdominal pain.   Musculoskeletal: Negative for arthralgias.   Neurological: Negative for dizziness, syncope, light-headedness and headaches.       Objective:      Physical Exam   Constitutional: She is oriented to person, place, and time. She appears well-developed and well-nourished.   HENT:   Head: Normocephalic and atraumatic.   Right Ear: External ear normal.   Left Ear: External ear normal.   Nose: Nose normal.   Mouth/Throat: No oropharyngeal exudate.   Eyes: Conjunctivae and EOM are normal. Pupils are equal, round, and reactive to light. Right eye exhibits no discharge. Left eye exhibits no discharge. No scleral icterus.   Neck: Normal range of motion. Neck supple. No JVD present. No tracheal deviation present. No thyromegaly present.   Cardiovascular: Normal rate, normal heart sounds and intact distal pulses.  Exam reveals no gallop and no friction rub.    No murmur heard.  Pulses:       Dorsalis pedis pulses are 3+ on the right side, and 3+ on the left side.        Posterior tibial pulses are 3+ on the right side, and 3+ on the left side.   Pulmonary/Chest: Effort normal. No stridor. No respiratory distress. She has no wheezes. She has no rales. She exhibits no tenderness.   Abdominal: Soft. Bowel sounds are normal. She exhibits no distension and no mass. There is no tenderness. There is no  rebound and no guarding.   Dressing in place   Musculoskeletal: Normal range of motion. She exhibits no edema.   Feet:   Right Foot:   Protective Sensation: 6 sites tested. 6 sites sensed.   Skin Integrity: Negative for ulcer, blister or skin breakdown.   Left Foot:   Protective Sensation: 6 sites tested. 6 sites sensed.   Skin Integrity: Negative for ulcer, blister or skin breakdown.   Lymphadenopathy:     She has no cervical adenopathy.   Neurological: She is alert and oriented to person, place, and time. She displays normal reflexes. No cranial nerve deficit. She exhibits normal muscle tone. Coordination normal.   Skin: Skin is dry. No rash noted. She is not diaphoretic. No erythema. No pallor.   Psychiatric: She has a normal mood and affect. Her behavior is normal. Judgment and thought content normal.   Vitals reviewed.      Lab Results   Component Value Date    HGBA1C 6.2 (H) 09/17/2017   \  Assessment:       1. Recurrent UTI    2. Lumbar degenerative disc disease    3. Spondylolisthesis of cervical region    4. Encounter for patient compliance monitoring in drug treatment program    5. Chronic systolic congestive heart failure        Plan:       Recurrent UTI  -     Urine culture; Future; Expected date: 11/08/2017  -     URINALYSIS; Future; Expected date: 11/08/2017    Lumbar degenerative disc disease  -     hydrocodone-acetaminophen 10-325mg (NORCO)  mg Tab; Take 1 tablet by mouth every 8 (eight) hours as needed for Pain.  Dispense: 90 tablet; Refill: 0  -     hydrocodone-acetaminophen 10-325mg (NORCO)  mg Tab; Take 1 tablet by mouth every 8 (eight) hours as needed for Pain.  Dispense: 90 tablet; Refill: 0  -     hydrocodone-acetaminophen 10-325mg (NORCO)  mg Tab; Take 1 tablet by mouth every 8 (eight) hours as needed for Pain.  Dispense: 90 tablet; Refill: 0  -     clonazePAM (KLONOPIN) 1 MG tablet; 1/2 to1 tab at night  Dispense: 30 tablet; Refill: 3    Spondylolisthesis of cervical  region  -     hydrocodone-acetaminophen 10-325mg (NORCO)  mg Tab; Take 1 tablet by mouth every 8 (eight) hours as needed for Pain.  Dispense: 90 tablet; Refill: 0  -     hydrocodone-acetaminophen 10-325mg (NORCO)  mg Tab; Take 1 tablet by mouth every 8 (eight) hours as needed for Pain.  Dispense: 90 tablet; Refill: 0  -     hydrocodone-acetaminophen 10-325mg (NORCO)  mg Tab; Take 1 tablet by mouth every 8 (eight) hours as needed for Pain.  Dispense: 90 tablet; Refill: 0    Encounter for patient compliance monitoring in drug treatment program  -     TOXICOLOGY SCREEN, URINE, RANDOM (COMPLIANCE)    Chronic systolic congestive heart failure  -     furosemide (LASIX) 40 MG tablet; TAKE ONE TABLET TWICE A DAYFOR FLUID OVERLOAD  Dispense: 60 tablet; Refill: 5    Other orders  -     polyethylene glycol (GLYCOLAX) 17 gram/dose powder; Take 17 g by mouth once daily.  Dispense: 1530 g; Refill: 11      Patient readiness: acceptance and barriers:readiness, social stressors and environmental    During the course of the visit the patient was educated and counseled about the following:     Diabetes:  Discussed general issues about diabetes pathophysiology and management.  Hypertension:   Dietary sodium restriction.  Regular aerobic exercise.  Check blood pressures daily and record.  Obesity:   General weight loss/lifestyle modification strategies discussed (elicit support from others; identify saboteurs; non-food rewards, etc).  Regular aerobic exercise program discussed.    Goals: Diabetes: Maintain Hemoglobin A1C below 7, Hypertension: Reduce Blood Pressure and Obesity: Reduce calorie intake and BMI    Did patient meet goals/outcomes: No    The following self management tools provided: blood pressure log  blood glucose log  excercise log    Patient Instructions (the written plan) was given to the patient/family.     Time spent with patient: 30 minutes

## 2017-10-25 NOTE — LETTER
"2017    Nelly Tian  [address] [place] [id number]             [address] [address]  Phone: [phone]  Fax: [phone]   2017    Re: Nelly Tian        : 1950        MRN: [id number]    PAIN ORIENTATION AGREEMENT    My doctor and I have decided that as part of my treatment for chronic pain, I will receive prescriptions for controlled substances.  As a patient, I will agree to the following terms in order for my provider to effectively treat my pain and also comply with the rules set forth by [facility] Board of Medical Examiners and Drug Enforcement Agency.  1. I understand that in order for me to receive the best possible care, my pain management doctor needs a copy of any previous medical records, including MRI, office notes, lab results, etc.  2. I will provide a full list of my medications, current dose, and how often I take my medication.  3. A single physician shall be responsible for prescribing my pain medication.  4. I understand that my physician may require an office visit every 3 months for certain controlled substances.  5. It is my responsibility to keep my appointments.  If I miss my schedule appointment, I will be rescheduled to the first available time slot.  I understand that pain medications may not be refilled until seen.  6. If my doctor agrees to refill my pain medication by telephone, I will call the office at least 5 business days in advance to request a refill prescription.  7. Refill prescriptions will not be written in the evenings, weekends, or on holidays.  8. Each prescription is expected to last at least one month.  Refills will not be given early if I "run out early", "lose a prescription", "spill" or "misplaced" my medication.  9. It may be necessary for prescriptions to be picked up in person and proof of identification may be required.  10. I will have my pain medication filled at " only one pharmacy.                 [facility] [address] [place], [place] - [address]  [address] [facility] [address]  Phone: [phone] Fax: [phone]    [facility] - [place], [place] - [address] [facility]  [address] [facility] [address]  Phone: [phone] Fax: [phone]    [facility] and [place], [place] - [address] [facility] [address]  [address] [facility] [place] [address]  Phone: [phone] Fax: [phone]    [facility] ([facility] - [facility] [address]  [phone]  [phone]  [phone]  [facility] [address]  Phone: [phone] Fax: [phone]    [facility] [place], [place] - [address]  [address]  [facility] [address]  Phone: [phone] Fax: [phone]    [facility] [place], [place] - [address]  [address]  [facility] [address]  Phone: [phone] Fax: [phone]         11. A baseline drug screen may be completed on my first visit and or randomly at other routine clinic visits.      I have read and understand the above information.  I will, to the best of my ability, adhere to these policies and commitments.  I further understand that noncompliance with these policies may result in my being discharged as the patient.      _____________________                     _____Nelly Tian______  Patient signature/date         Patient printed  name                                                                                  _____________________                    ____________________  Physician signature/printed name/date         Witness/date    Constantine Bird MD 10/25/2017                BEHAVIOR AGREEMENT FOR THE USE OF CONTROLLED DRUGS  The following has been explained to me:  1. It is possible that I may become physically dependent, psychologically dependent, tolerant and/or addicted to controlled substance medications.   2. Physical dependence occurs if withdrawal symptoms are experienced when  "the drug is suddenly discontinued.  3. Tolerance is the need for higher doses of the drug to achieve the same amount of pain control.  4. Addition is a psychological and behavioral syndrome that is recognized when the patient abuses the drug to obtain mental numbness or euphoria (get high), or shows drug craving behavior or manipulative attitude toward the physician in order to obtain the drug.  5. Withdrawal symptoms may occur if pain medication is stopped abruptly.   These symptoms include yawning, sweating, watery eyes, runny nose, anxiety, tremors, achy muscles, hot and cold flashes, "gooseflesh ", abdominal cramps or diarrhea.  6. I will not cut or chew long-acting pain medication.  7. If severe sedation (sleepiness) or any other medical emergency relating to my pain medication occurs, I will contact my doctor's office or seek ER attention immediately.  8. I will not combine these drugs with alcohol or recreational drugs (this includes marijuana).     9. I must inform my doctor if I am taking any other sedating drugs such as Valium, Ativan, seizure medication or psychiatric drugs.    10. I will inform my physician of any current or prior history of drug abuse or prescription medication misuse.  11. These medications may be harmful to an unborn child.  I have been advised to use 2 forms of birth control (at least one barrier, such as condoms) while using these medications.    12. If I test positive for drugs that my doctor has not prescribed, and/or if I refuses a random drug test, my physician has the right to stop my controlled substance, end  his/her relationship with me, and I may be terminated from the clinic.   13. If at any time I become violent or abusive, verbally or physically, my actions will be considered cause to terminate care from the clinic and discontinuation of pain medications.          I understand and agree that if I fail to abide by the above agreements, or if I show signs suspicious of " narcotic over use or abuse, my pain management physician may discontinue treatment, and narcotic prescriptions will be discontinued.            _____________________                     _____Nelly Tian______  Patient signature/date          Patient printed  name                                                                                _____________________                    ____________________  Physician signature/printed name/date           Witness/date    Constantine Bird MD 10/25/2017

## 2017-10-25 NOTE — PATIENT INSTRUCTIONS
Eating Out When You Have Diabetes  Eating right is an important part of keeping your blood sugar in your target range. You just need to make healthy choices.    Tips for restaurant meals  When you eat away from home try these tips:  · Try to schedule your dining-out meal at your normal meal time. Make a reservation if possible, so you don't have to wait to eat. If you can't make a reservation, try to arrive at the restaurant at a less-busy time to cut down your wait time. Eat a small fruit or starch snack at your regular mealtime if your restaurant meal is going to be later than usual.   · Call ahead to see if the restaurant can meet your dietary needs if you've never been there before. Or you can go online to see the menu ahead of time.  · Carry some crackers with you in case the restaurant needs you to wait until you can be served.  · Ask how foods are prepared before you order.  · Instead of fried, sautéed, or breaded foods, choose ones that are broiled, steamed, grilled, or baked.  · Ask for sauces, gravies, and dressings on the side.  · Only eat an amount that fits your meal plan. Remember: You can take home the leftovers.  · Save dessert for special occasions. Then choose a small dessert or share one with a friend or family member.  Make healthy choices  Fast food  · Garden salad with light dressing on the side  · Baked potato with vegetables or herbs  · Broiled, roasted, or grilled chicken sandwich  · Sliced turkey or lean roast beef sandwich  Mexican  · Chicken enchilada, without cheese or sour cream   · Small burrito with whole beans and chicken  · Whole beans (not refried) and rice  · Chicken or fish fajitas  Steakhouse  · Grilled or broiled lean cuts of beef  · Baked potato with vegetables or herbs  · Broiled or baked chicken. Dont eat the skin.  · Steamed vegetables  Asian  · Steamed dumplings or potstickers  · Broiled, boiled, or steamed meats or fish  · Sushi or sashimi  · Steamed rice or boiled  noodles. One serving is equal to 1/3 cup.  Date Last Reviewed: 6/1/2016  © 1392-7986 [employer]. [address] [place] [address], [place] [address]. All rights reserved. This information is not intended as a substitute for professional medical care. Always follow your healthcare professional's instructions.        For Kids: Maintaining a Healthy Weight  Have you heard of TV Zombies and Soda Monsters? If not, then listen up. These creepy creatures live in every town. They can sneak up at any moment. First the TV Zombie slows you down. Then the Soda Monster stuffs you with sugar. Together, they can make you gain too much weight. How do you stop them? Keep reading to find out!     Turn Off the TV! To stop the TV Zombie, get up and play!   Keep zombies away with play  If you watch TV all day, the TV Zombie will turn your muscles into jelly. So what do you do? It's simple. Get moving! Do things you think are fun. The TV Zombie is lazy. If you play every day, there's no way it can catch you. Try lots of different things until you find what YOU like. Then cut loose! Shoot hoops with a friend. Or, dance to music. It doesn't really matter as long as you're having fun!  Go for it!  When it comes to play, don't hold back. Play until you breathe harder and sweat. Do this every day. Playing hard helps you keep up during PE or gym. You'll feel stronger, too! And don't forget: Anyone can play hard. Healthy, strong bodies come in all shapes and sizes.     Stop the Pop! To stop the soda monster, drink water or milk when you're thirsty.   Soak the Soda Monster  TV commercials never show the Soda Monster. Instead, they make it seem like drinking soda or sports drinks will make you move faster. But this isn't the truth. Those drinks are full of sugar. And drinking sugary stuff all the time can make you gain too much weight. It can even rot your teeth. Yuck! The best drinks for you are water and milk. Drink them every day. If you  do, the soda monster won't know what hit it!  Great choices keep you going  When you play hard, you need the right fuel. Fruits and veggies have vitamins that keep your body healthy. Foods like fish, beans, and chicken help build strong muscles. And things like rice, wheat bread, and tortillas give you energy to play. Eat healthy foods anytime. But beware of junk foods. They can slow you down.  Soda Monster math  Did you know one soda has about 10 spoonfuls of sugar in it?! Too much sugar is bad for you. How much sugar do YOU drink? Multiply the number of sodas you drink in a week by 10. That's how many spoonfuls of sugar you stuff in!!!  Date Last Reviewed: 6/9/2015  © 9441-4166 [employer] [address] [place] [address]. All rights reserved. This information is not intended as a substitute for professional medical care. Always follow your healthcare professional's instructions.        Weight Management: Exercise and Activity    Studies show that people who exercise are the most likely to lose weight and keep it off. Exercise burns calories. It helps build muscle to make your body stronger. Make exercise an important part of your weight-management plan.  Make activity part of your day  You may not think you have the time to exercise. But you can work activity into your daily life--you just need to be committed. Take 10 minutes out of your lunch hour to take a walk. Walk to the newsstand to get your paper instead of having it delivered. Make it a habit to take the stairs instead of the elevator. Park in a far away parking spot instead of the closest. Youll be surprised at how fast these little changes can make a difference.  Some people really cannot walk very far, and tire out quickly with exercise. Instead of becoming discouraged, resolve to do what you can do, and work to make that a regular frequent habit.   The benefits of exercise  Exercise offers many benefits  including:   · Exercise increases your metabolism (the speed at which your body burns calories).  · Regular exercise can increase the amount of muscle in your body. Muscle burns calories faster than fat. The more muscle you have, the more calories you burn.  · Exercise gives you energy and curbs your appetite.  · Exercise decreases stress and helps you sleep better.  Make exercise fun  Exercise can be fun. Choose an activity you enjoy. You may even get a friend to do it with you:  · Take a resistance-training or aerobics class  · Join a team sport  · Take a dance class  · Walk the dog  · Ride a bike  If you have health problems, be sure to ask your healthcare provider before you start an exercise program. Have a  help you develop a plan thats safe for you.   Date Last Reviewed: 2/4/2016 © 2000-2017 [employer]. [address] [place] [id number]. All rights reserved. This information is not intended as a substitute for professional medical care. Always follow your healthcare professional's instructions.        Established High Blood Pressure    High blood pressure (hypertension) is a chronic disease. Often, healthcare providers dont know what causes it. But it can be caused by certain health conditions and medicines.  If you have high blood pressure, you may not have any symptoms. If you do have symptoms, they may include headache, dizziness, changes in your vision, chest pain, and shortness of breath. But even without symptoms, high blood pressure thats not treated raises your risk for heart attack and stroke. High blood pressure is a serious health risk and shouldnt be ignored.  A blood pressure reading is made up of two numbers: a higher number over a lower number. The top number is the systolic pressure. The bottom number is the diastolic pressure. A normal blood pressure is a systolic pressure of  less than 120 over a diastolic pressure of less than 80. You will see  your blood pressure readings written together. For example, a person with a systolic pressure of 188 and a diastolic pressure of 78 will have 118/78 written in the medical record.  High blood pressure is when either the top number is 140 or higher, or the bottom number is 90 or higher. This must be the result when taking your blood pressure a number of times. The blood pressures between normal and high are called prehypertension.  Home care  If you have high blood pressure, you should do what is listed below to lower your blood pressure. If you are taking medicines for high blood pressure, these methods may reduce or end your need for medicines in the future.  · Begin a weight-loss program if you are overweight.  · Cut back on how much salt you get in your diet. Heres how to do this:  ¨ Dont eat foods that have a lot of salt. These include olives, pickles, smoked meats, and salted potato chips.  ¨ Dont add salt to your food at the table.  ¨ Use only small amounts of salt when cooking.  · Start an exercise program. Talk with your healthcare provider about the type of exercise program that would be best for you. It doesn't have to be hard. Even brisk walking for 20 minutes 3 times a week is a good form of exercise.  · Dont take medicines that stimulate the heart. This includes many over-the-counter cold and sinus decongestant pills and sprays, as well as diet pills. Check the warnings about hypertension on the label. Before buying any over-the-counter medicines or supplements, always ask the pharmacist about the product's potential interaction with your high blood pressure and your high blood pressure medicines.  · Stimulants such as amphetamine or cocaine could be deadly for someone with high blood pressure. Never take these.  · Limit how much caffeine you get in your diet. Switch to caffeine-free products.  · Stop smoking. If you are a long-time smoker, this can be hard. Talk to your healthcare provider about  medicines and nicotine replacement options to help you. Also, enroll in a stop-smoking program to make it more likely that you will quit for good.  · Learn how to handle stress. This is an important part of any program to lower blood pressure. Learn about relaxation methods like meditation, yoga, or biofeedback.  · If your provider prescribed medicines, take them exactly as directed. Missing doses may cause your blood pressure get out of control.  · If you miss a dose or doses, check with your healthcare provider or pharmacist about what to do.  · Consider buying an automatic blood pressure machine. Ask your provider for a recommendation. You can get one of these at most pharmacies.     The American Heart Association recommends the following guidelines for home blood pressure monitoring:  · Don't smoke or drink coffee for 30 minutes before taking your blood pressure.  · Go to the bathroom before the test.  · Relax for 5 minutes before taking the measurement.  · Sit with your back supported (don't sit on a couch or soft chair); keep your feet on the floor uncrossed. Place your arm on a solid flat surface (like a table) with the upper part of the arm at heart level. Place the middle of the cuff directly above the eye of the elbow. Check the monitor's instruction manual for an illustration.  · Take multiple readings. When you measure, take 2 to 3 readings one minute apart and record all of the results.  · Take your blood pressure at the same time every day, or as your healthcare provider recommends.  · Record the date, time, and blood pressure reading.  · Take the record with you to your next medical appointment. If your blood pressure monitor has a built-in memory, simply take the monitor with you to your next appointment.  · Call your provider if you have several high readings. Don't be frightened by a single high blood pressure reading, but if you get several high readings, check in with your healthcare  provider.  · Note: When blood pressure reaches a systolic (top number) of 180 or higher OR diastolic (bottom number) of 110 or higher, seek emergency medical treatment.  Follow-up care  You will need to see your healthcare provider regularly. This is to check your blood pressure and to make changes to your medicines. Make a follow-up appointment as directed. Bring the record of your home blood pressure readings to the appointment.  When to seek medical advice  Call your healthcare provider right away if any of these occur:  · Blood pressure reaches a systolic (upper number) of 180 or higher OR a diastolic (bottom number) of 110 or higher  · Chest pain or shortness of breath  · Severe headache  · Throbbing or rushing sound in the ears  · Nosebleed  · Sudden severe pain in your belly (abdomen)  · Extreme drowsiness, confusion, or fainting  · Dizziness or spinning sensation (vertigo)  · Weakness of an arm or leg or one side of the face  · You have problems speaking or seeing   Date Last Reviewed: 12/1/2016  © 6290-5920 [employer]. [address] [place] [address], [place] [address]. All rights reserved. This information is not intended as a substitute for professional medical care. Always follow your healthcare professional's instructions.

## 2017-10-26 ENCOUNTER — TELEPHONE (OUTPATIENT)
Dept: BARIATRICS | Facility: CLINIC | Age: 67
End: 2017-10-26

## 2017-10-26 NOTE — TELEPHONE ENCOUNTER
----- Message from Britany Kraus sent at 10/26/2017 11:42 AM CDT -----  Contact: pt  Pt states that she needs an appointment for hernia on side removed that Dr Lucero saw on pt in the hospital....[phone]

## 2017-10-27 ENCOUNTER — OUTPATIENT CASE MANAGEMENT (OUTPATIENT)
Dept: ADMINISTRATIVE | Facility: OTHER | Age: 67
End: 2017-10-27

## 2017-10-27 NOTE — PROGRESS NOTES
10/27/17-Patient called back. Patient is having no problems with her COPD but is having problems with her nebulizer. Patient has changed the filter but it is still not putting out as it should. Instructed her to call the DME that sold her the nebulizer and let them look at it if there was no charge. Also to ask how old her nebulizer is, because the DME company can tell her if she is eligible for a new nebulizer. Patient has taken all her medications for her UTI. She had an appointment with her PCP on 10/25/17 and has no UTI at this time. Patient's blood pressure was running high at her appointments on 10/25/17. Patient states that she knows that she need to get on a diet to lose weight to get her blood pressure under control. Patient has an appointment with the bariatric surgeon on 10/31/17. Will follow up with patient and go over educational material mailed to her.

## 2017-10-31 ENCOUNTER — DOCUMENTATION ONLY (OUTPATIENT)
Dept: FAMILY MEDICINE | Facility: CLINIC | Age: 67
End: 2017-10-31

## 2017-10-31 RX ORDER — NYSTATIN AND TRIAMCINOLONE ACETONIDE 100000; 1 [USP'U]/G; MG/G
CREAM TOPICAL
Qty: 30 G | Refills: 1 | Status: SHIPPED | OUTPATIENT
Start: 2017-10-31 | End: 2017-12-13 | Stop reason: SDUPTHER

## 2017-10-31 RX ORDER — LISINOPRIL 20 MG/1
TABLET ORAL
Qty: 90 TABLET | Refills: 1 | Status: SHIPPED | OUTPATIENT
Start: 2017-10-31 | End: 2017-11-17 | Stop reason: SDUPTHER

## 2017-10-31 NOTE — PROGRESS NOTES
Pre-Visit Chart Review  For Appointment Scheduled on 11/06/2017      Health Maintenance Due   Topic Date Due    Zoster Vaccine  11/15/2011

## 2017-11-02 RX ORDER — ALBUTEROL SULFATE 90 UG/1
AEROSOL, METERED RESPIRATORY (INHALATION)
Qty: 18 G | Refills: 0 | Status: SHIPPED | OUTPATIENT
Start: 2017-11-02 | End: 2018-03-30 | Stop reason: SDUPTHER

## 2017-11-03 ENCOUNTER — TELEPHONE (OUTPATIENT)
Dept: FAMILY MEDICINE | Facility: CLINIC | Age: 67
End: 2017-11-03

## 2017-11-03 NOTE — TELEPHONE ENCOUNTER
----- Message from Trish Tapia sent at 11/3/2017  7:19 AM CDT -----  Contact: Britt with [facility]  Britt with [facility] [phone] is calling as patient has a rash on her buttocks/she has some Nystatin and Britt is asking if she can get an order to use this cream/please advise

## 2017-11-06 RX ORDER — NYSTATIN 100000 U/G
CREAM TOPICAL 2 TIMES DAILY
Qty: 30 G | Refills: 4 | Status: SHIPPED | OUTPATIENT
Start: 2017-11-06 | End: 2018-08-22 | Stop reason: ALTCHOICE

## 2017-11-06 NOTE — TELEPHONE ENCOUNTER
Call placed to Britt with [facility] for notification. No answer x's 2 attempts. Unable to leave voicemail. Call placed to patient for notification. Verbalized understanding. Also advised patient prescription for Nystatin was e-scribed to pharmacy. Patient states she just left pharmacy and did not receive this medication. Call placed to pharmacy to confirm receipt of e-scribe. Spoke to Fritz who states pharmacy received e-scribe, but this can not be filled until 11-14-17. Patient notified. Verbalized understanding.

## 2017-11-09 ENCOUNTER — TELEPHONE (OUTPATIENT)
Dept: ORTHOPEDICS | Facility: CLINIC | Age: 67
End: 2017-11-09

## 2017-11-09 NOTE — TELEPHONE ENCOUNTER
Advised pt that injection were approved for tomorrow's appointment. Pt voiced understanding and has no further requests.

## 2017-11-09 NOTE — TELEPHONE ENCOUNTER
----- Message from Jannie Hansen sent at 11/9/2017  9:02 AM CST -----  Contact: self  Patient called regarding her appt tomorrow for injection in knee. Wanted to make sure authorization was received from her insurance company before. Please contact [phone] (bbrr)

## 2017-11-10 ENCOUNTER — OUTPATIENT CASE MANAGEMENT (OUTPATIENT)
Dept: ADMINISTRATIVE | Facility: OTHER | Age: 67
End: 2017-11-10

## 2017-11-10 ENCOUNTER — OFFICE VISIT (OUTPATIENT)
Dept: ORTHOPEDICS | Facility: CLINIC | Age: 67
End: 2017-11-10
Payer: MEDICARE

## 2017-11-10 VITALS — WEIGHT: 287.25 LBS | BODY MASS INDEX: 43.53 KG/M2 | HEIGHT: 68 IN

## 2017-11-10 DIAGNOSIS — M17.0 PRIMARY OSTEOARTHRITIS OF BOTH KNEES: Primary | ICD-10-CM

## 2017-11-10 PROCEDURE — 99499 UNLISTED E&M SERVICE: CPT | Mod: S$GLB,,, | Performed by: ORTHOPAEDIC SURGERY

## 2017-11-10 PROCEDURE — 20610 DRAIN/INJ JOINT/BURSA W/O US: CPT | Mod: 50,S$GLB,, | Performed by: ORTHOPAEDIC SURGERY

## 2017-11-10 PROCEDURE — 99999 PR PBB SHADOW E&M-EST. PATIENT-LVL II: CPT | Mod: PBBFAC,,, | Performed by: ORTHOPAEDIC SURGERY

## 2017-11-10 RX ADMIN — Medication 20 MG: at 03:11

## 2017-11-10 NOTE — PROGRESS NOTES
11/10/17- Called and spoke to patient her blood pressure has been running within normal limits. Patient's blood glucose has been running between 130-135. Patient is watching her diabetic diet. Patient went today and received injections into her knees by the orthopedic doctor. Patient has her feet propped up and has ice on her knees at this time. Went over education with patient about hyperglycemia with steroid injections. Patient may eat out for her birthday on Wednesday, encouraged maintaining a diabetic diet. Will follow up with patient in two weeks to continue education.

## 2017-11-13 RX ORDER — HYALURONATE SODIUM 20 MG/2 ML
20 SYRINGE (ML) INTRAARTICULAR
Status: DISCONTINUED | OUTPATIENT
Start: 2017-11-10 | End: 2017-11-13 | Stop reason: HOSPADM

## 2017-11-13 NOTE — PROGRESS NOTES
Past Medical History:   Diagnosis Date    *Atrial flutter     Angina pectoris 2017    Anxiety     Arthritis     Asthma     Atrial fibrillation     Back pain     Cataract     OD    CHF (congestive heart failure)     COPD (chronic obstructive pulmonary disease)     Depression     Diabetes mellitus     Emphysema of lung     Heart failure     Hernia     Hypercapnic respiratory failure, chronic 2016    Hyperlipidemia     Hypertension     Iron deficiency anemia 2/3/2016    Myocardial infarction     Obesity     Peripheral vascular disease     Pneumonia     Polyneuropathy     Retinal detachment     OS    Septic shock 2017    Skin ulcer 3/18/2017    Tobacco dependence     Type II 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, not stated as uncontrolled(250.60)        Past Surgical History:   Procedure Laterality Date    BREAST BIOPSY Left 10 yrs ago    benign    CATARACT EXTRACTION Left     OS      SECTION      x2    EYE SURGERY      HYSTERECTOMY      OOPHORECTOMY      one ovary conserved    RETINAL DETACHMENT SURGERY      buckle --OS    TONSILLECTOMY         Current Outpatient Prescriptions   Medication Sig    albuterol (VENTOLIN HFA) 90 mcg/actuation inhaler INHALE TWO PUFFS EVERY 6 HOURS AS NEEDED FOR WHEEZING    albuterol-ipratropium 2.5mg-0.5mg/3mL (DUO-NEB) 0.5 mg-3 mg(2.5 mg base)/3 mL nebulizer solution INHALE THE CONTENTS OF 1 VIAL VIA NEBULIZER EVERY 6 HOURS AS NEEDED FOR  WHEEZING (DO NOT USE WITH ALBUTEROL INHALER)    apixaban 5 mg Tab Take 1 tablet (5 mg total) by mouth 2 (two) times daily.    ascorbic acid, vitamin C, (VITAMIN C) 500 MG tablet Take 1 tablet (500 mg total) by mouth every evening.    atorvastatin (LIPITOR) 20 MG tablet TAKE 1 TABLET (20 MG TOTAL) BY MOUTH ONCE DAILY.    budesonide (PULMICORT) 0.5 mg/2 mL nebulizer solution Take 3 mLs by nebulization once daily.    cephALEXin (KEFLEX) 500 MG capsule Take  500 mg by mouth 4 (four) times daily.    clonazePAM (KLONOPIN) 1 MG tablet 1/2 to1 tab at night    furosemide (LASIX) 40 MG tablet TAKE ONE TABLET TWICE A DAYFOR FLUID OVERLOAD    gabapentin (NEURONTIN) 800 MG tablet Take 1 tablet (800 mg total) by mouth 3 (three) times daily.    hydrocodone-acetaminophen 10-325mg (NORCO)  mg Tab Take 1 tablet by mouth every 8 (eight) hours as needed for Pain.    [START ON 12/7/2017] hydrocodone-acetaminophen 10-325mg (NORCO)  mg Tab Take 1 tablet by mouth every 8 (eight) hours as needed for Pain.    [START ON 1/8/2018] hydrocodone-acetaminophen 10-325mg (NORCO)  mg Tab Take 1 tablet by mouth every 8 (eight) hours as needed for Pain.    levalbuterol (XOPENEX) 0.63 mg/3 mL nebulizer solution INHALE THE CONTENTS OF 1 VIAL BY NEBULIZATION 3 (THREE) TIMES DAILY.    lisinopril (PRINIVIL,ZESTRIL) 20 MG tablet TAKE 1 TABLET EVERY DAY    magnesium oxide (MAG-OX) 400 mg tablet Take 1 tablet (400 mg total) by mouth 2 (two) times daily.    metformin (GLUCOPHAGE) 500 MG tablet TAKE 1 TABLET TWICE DAILY WITH MEALS    metoprolol succinate (TOPROL-XL) 25 MG 24 hr tablet TAKE 1/2 TABLET EVERY DAY    multivitamin (THERAGRAN) tablet Take 1 tablet by mouth once daily.    nystatin (MYCOSTATIN) cream Apply topically 2 (two) times daily.    nystatin (MYCOSTATIN) powder Apply topically 4 (four) times daily.    nystatin-triamcinolone (MYCOLOG II) cream APPLY SPARINGLY TO AFFECTED AREA(S) 3 TIMES DAILY AS NEEDED    polyethylene glycol (GLYCOLAX) 17 gram/dose powder Take 17 g by mouth once daily.    potassium chloride SA (K-DUR,KLOR-CON) 20 MEQ tablet Take 1 tablet (20 mEq total) by mouth once daily.    trazodone (DESYREL) 150 MG tablet Take 0.5 tablets (75 mg total) by mouth every evening.    TRUE METRIX GLUCOSE TEST STRIP Strp TEST BLOOD SUGAR THREE TIMES DAILY     No current facility-administered medications for this visit.        Allergies   Allergen Reactions     Dilaudid [Hydromorphone] Anaphylaxis     Other reaction(s): Anaphylaxis  Other reaction(s): Unknown       Family History   Problem Relation Age of Onset    Arthritis Father     Heart disease Father     Early death Sister 30     heart disease    Heart disease Sister 32     MI    No Known Problems Brother     No Known Problems Daughter     Blindness Son      due to accident//    Arthritis Sister     Heart disease Sister     Crohn's disease Sister     Cancer Brother      asbestosis    Alcohol abuse Mother     Breast cancer Neg Hx     Glaucoma Neg Hx     Macular degeneration Neg Hx     Retinal detachment Neg Hx     Amblyopia Neg Hx     Diabetes Neg Hx     Cataracts Neg Hx     Hypertension Neg Hx     Strabismus Neg Hx     Stroke Neg Hx     Thyroid disease Neg Hx        Social History     Social History    Marital status:      Spouse name: N/A    Number of children: N/A    Years of education: N/A     Occupational History    Not on file.     Social History Main Topics    Smoking status: Current Every Day Smoker     Packs/day: 0.25     Years: 50.00     Types: Cigarettes     Start date: 1/19/1968    Smokeless tobacco: Former User     Quit date: 2/12/2017      Comment: 1 ppd for 20 years    Alcohol use No    Drug use: No    Sexual activity: Not Currently     Other Topics Concern    Not on file     Social History Narrative    No narrative on file       Chief Complaint:   Chief Complaint   Patient presents with    Injections     Euflexxa #1 Bilateral knees        Consulting Physician: Mehran Carrasco MD    History of present illness: This is a 64-year-old young lady who reports bilateral knee pain.  We have seen her before and injected her with Synvisc and she has been doing very well since that point in time.  She states her pain started to increase in the last month or so.  She puts her pain up to a 10 out of 10 with activities.      Review of Systems:    Constitution: Denies  chills, fever, and sweats.    HENT: Denies headaches. Reports blurry vision.    Cardiovascular: Denies chest pain or irregular heart beat.    Respiratory: Denies cough. Reports shortness of breath.    Gastrointestinal: Denies abdominal pain, nausea, or vomiting.    Musculoskeletal:  Denies muscle cramps.    Neurological: Denies dizziness or focal weakness.    Psychiatric/Behavioral: Normal mental status. Anxiety.    Hematologic/Lymphatic: Denies bleeding problem or easy bruising/bleeding.    Skin: Denies rash or suspicious lesions.      Examination:    Vital Signs:    There were no vitals filed for this visit.    Body mass index is 43.68 kg/m².    This a well-developed, well nourished patient in no acute distress.    Alert and oriented and cooperative to examination.       Physical Exam: Left Knee Exam    Gait   antalgic    Skin  Rash:   None  Scars:   None    Inspection  Erythema:  None  Ecchymosis:  None  Effusion:  Mild  Masses:  None  Lymphadenopathy: None    Range of Motion: Full - 0 to 130°    Medial Joint : Mild  Lateral Joint : Mild    Patellofemoral Tenderness: None  Patellofemoral Crepitus: None    Lachman:  Normal  Anterior Drawer: Normal  Posterior Drawer: Normal    Dilia's:  Negative  Apley's:  Negative    Varus Stress:  Stable  Valgus Stress: Stable    Strength:  5/5    Pulses:  Palpable distal    Sensation:  Intact      Right Knee Exam    Gait:   Antalgic    Skin  Rash:   None  Scars:   None    Inspection  Erythema:  None  Ecchymosis:  None  Effusion:  Mild  Masses:  None  Lymphadenopathy: None    Range of Motion: Full - 0 to 130°    Medial Joint : Mild  Lateral Joint : Mild    Patellofemoral Tenderness: None  Patellofemoral Crepitus: None    Lachman:  Normal  Anterior Drawer: Normal  Posterior Drawer: Normal    Dilia's:  Negative  Apley's:  Negative    Varus Stress:  Stable  Valgus Stress: Stable    Strength:  5/5    Pulses:  Palpable  distal    Sensation:  Intact          Imaging: X-rays ordered and reviewed personally of both knees reveals some degenerative changes that are moderate.     Assessment: Primary osteoarthritis of both knees  -     Large Joint Aspiration/Injection        Plan: Euflexxa series    DISCLAIMER: This note may have been dictated using voice recognition software and may contain grammatical errors. Report sent to referring provider as required.

## 2017-11-13 NOTE — PROCEDURES
Large Joint Aspiration/Injection  Date/Time: 11/10/2017 3:04 PM  Performed by: DEE WILLIAMSON  Authorized by: DEE WILLIAMSON     Consent Done?:  Yes (Verbal)  Indications:  Pain  Timeout: Prior to procedure the correct patient, procedure, and site was verified      Location:  Knee  Site:  R knee and L knee  Prep: Patient was prepped and draped in usual sterile fashion    Approach:  Anteromedial  Medications:  20 mg EUFLEXXA 10 mg/mL(mw 2.4 -3.6 million); 20 mg EUFLEXXA 10 mg/mL(mw 2.4 -3.6 million)

## 2017-11-17 ENCOUNTER — OFFICE VISIT (OUTPATIENT)
Dept: ORTHOPEDICS | Facility: CLINIC | Age: 67
End: 2017-11-17
Payer: MEDICARE

## 2017-11-17 VITALS — WEIGHT: 287.25 LBS | HEIGHT: 68 IN | BODY MASS INDEX: 43.53 KG/M2

## 2017-11-17 DIAGNOSIS — M17.0 PRIMARY OSTEOARTHRITIS OF BOTH KNEES: Primary | ICD-10-CM

## 2017-11-17 PROCEDURE — 99499 UNLISTED E&M SERVICE: CPT | Mod: S$GLB,,, | Performed by: ORTHOPAEDIC SURGERY

## 2017-11-17 PROCEDURE — 99999 PR PBB SHADOW E&M-EST. PATIENT-LVL II: CPT | Mod: PBBFAC,,, | Performed by: ORTHOPAEDIC SURGERY

## 2017-11-17 PROCEDURE — 20610 DRAIN/INJ JOINT/BURSA W/O US: CPT | Mod: 50,S$GLB,, | Performed by: ORTHOPAEDIC SURGERY

## 2017-11-17 RX ORDER — METFORMIN HYDROCHLORIDE 500 MG/1
TABLET ORAL
Qty: 180 TABLET | Refills: 3 | Status: SHIPPED | OUTPATIENT
Start: 2017-11-17 | End: 2018-04-18 | Stop reason: SDUPTHER

## 2017-11-17 RX ORDER — LANCETS 33 GAUGE
EACH MISCELLANEOUS
Qty: 300 EACH | Refills: 3 | Status: SHIPPED | OUTPATIENT
Start: 2017-11-17 | End: 2018-06-19

## 2017-11-17 RX ORDER — TEMAZEPAM 30 MG/1
CAPSULE ORAL
Qty: 30 CAPSULE | Refills: 1 | Status: SHIPPED | OUTPATIENT
Start: 2017-11-17 | End: 2018-04-13

## 2017-11-17 RX ORDER — GABAPENTIN 600 MG/1
TABLET ORAL
Qty: 270 TABLET | Refills: 3 | Status: SHIPPED | OUTPATIENT
Start: 2017-11-17 | End: 2017-12-28 | Stop reason: DRUGHIGH

## 2017-11-17 RX ADMIN — Medication 20 MG: at 10:11

## 2017-11-20 RX ORDER — LISINOPRIL 20 MG/1
TABLET ORAL
Qty: 90 TABLET | Refills: 1 | Status: SHIPPED | OUTPATIENT
Start: 2017-11-20 | End: 2018-11-26 | Stop reason: SDUPTHER

## 2017-11-20 RX ORDER — HYALURONATE SODIUM 20 MG/2 ML
20 SYRINGE (ML) INTRAARTICULAR
Status: DISCONTINUED | OUTPATIENT
Start: 2017-11-17 | End: 2017-11-21 | Stop reason: HOSPADM

## 2017-11-20 RX ORDER — POLYETHYLENE GLYCOL 3350 17 G/17G
POWDER, FOR SOLUTION ORAL
Qty: 1530 G | Refills: 0 | Status: SHIPPED | OUTPATIENT
Start: 2017-11-20 | End: 2018-03-02 | Stop reason: SDUPTHER

## 2017-11-21 NOTE — PROGRESS NOTES
Past Medical History:   Diagnosis Date    *Atrial flutter     Angina pectoris 2017    Anxiety     Arthritis     Asthma     Atrial fibrillation     Back pain     Cataract     OD    CHF (congestive heart failure)     COPD (chronic obstructive pulmonary disease)     Depression     Diabetes mellitus     Emphysema of lung     Heart failure     Hernia     Hypercapnic respiratory failure, chronic 2016    Hyperlipidemia     Hypertension     Iron deficiency anemia 2/3/2016    Myocardial infarction     Obesity     Peripheral vascular disease     Pneumonia     Polyneuropathy     Retinal detachment     OS    Septic shock 2017    Skin ulcer 3/18/2017    Tobacco dependence     Type II 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, not stated as uncontrolled(250.60)        Past Surgical History:   Procedure Laterality Date    BREAST BIOPSY Left 10 yrs ago    benign    CATARACT EXTRACTION Left     OS      SECTION      x2    EYE SURGERY      HYSTERECTOMY      OOPHORECTOMY      one ovary conserved    RETINAL DETACHMENT SURGERY      buckle --OS    TONSILLECTOMY         Current Outpatient Prescriptions   Medication Sig    albuterol (VENTOLIN HFA) 90 mcg/actuation inhaler INHALE TWO PUFFS EVERY 6 HOURS AS NEEDED FOR WHEEZING    albuterol-ipratropium 2.5mg-0.5mg/3mL (DUO-NEB) 0.5 mg-3 mg(2.5 mg base)/3 mL nebulizer solution INHALE THE CONTENTS OF 1 VIAL VIA NEBULIZER EVERY 6 HOURS AS NEEDED FOR  WHEEZING (DO NOT USE WITH ALBUTEROL INHALER)    apixaban 5 mg Tab Take 1 tablet (5 mg total) by mouth 2 (two) times daily.    ascorbic acid, vitamin C, (VITAMIN C) 500 MG tablet Take 1 tablet (500 mg total) by mouth every evening.    atorvastatin (LIPITOR) 20 MG tablet TAKE 1 TABLET (20 MG TOTAL) BY MOUTH ONCE DAILY.    budesonide (PULMICORT) 0.5 mg/2 mL nebulizer solution Take 3 mLs by nebulization once daily.    cephALEXin (KEFLEX) 500 MG capsule Take  500 mg by mouth 4 (four) times daily.    clonazePAM (KLONOPIN) 1 MG tablet 1/2 to1 tab at night    furosemide (LASIX) 40 MG tablet TAKE ONE TABLET TWICE A DAYFOR FLUID OVERLOAD    gabapentin (NEURONTIN) 600 MG tablet TAKE 1 TABLET THREE TIMES DAILY    gabapentin (NEURONTIN) 800 MG tablet Take 1 tablet (800 mg total) by mouth 3 (three) times daily.    hydrocodone-acetaminophen 10-325mg (NORCO)  mg Tab Take 1 tablet by mouth every 8 (eight) hours as needed for Pain.    [START ON 12/7/2017] hydrocodone-acetaminophen 10-325mg (NORCO)  mg Tab Take 1 tablet by mouth every 8 (eight) hours as needed for Pain.    [START ON 1/8/2018] hydrocodone-acetaminophen 10-325mg (NORCO)  mg Tab Take 1 tablet by mouth every 8 (eight) hours as needed for Pain.    levalbuterol (XOPENEX) 0.63 mg/3 mL nebulizer solution INHALE THE CONTENTS OF 1 VIAL BY NEBULIZATION 3 (THREE) TIMES DAILY.    lisinopril (PRINIVIL,ZESTRIL) 20 MG tablet TAKE 1 TABLET EVERY DAY    magnesium oxide (MAG-OX) 400 mg tablet Take 1 tablet (400 mg total) by mouth 2 (two) times daily.    metFORMIN (GLUCOPHAGE) 500 MG tablet TAKE 1 TABLET TWICE DAILY WITH MEALS    metoprolol succinate (TOPROL-XL) 25 MG 24 hr tablet TAKE 1/2 TABLET EVERY DAY    multivitamin (THERAGRAN) tablet Take 1 tablet by mouth once daily.    nystatin (MYCOSTATIN) cream Apply topically 2 (two) times daily.    nystatin (MYCOSTATIN) powder Apply topically 4 (four) times daily.    nystatin-triamcinolone (MYCOLOG II) cream APPLY SPARINGLY TO AFFECTED AREA(S) 3 TIMES DAILY AS NEEDED    polyethylene glycol (GLYCOLAX) 17 gram/dose powder MIX 1 CAPFUL (17GM) IN LIQUID AND DRINK BEFORE BREAKFAST    potassium chloride SA (K-DUR,KLOR-CON) 20 MEQ tablet Take 1 tablet (20 mEq total) by mouth once daily.    temazepam (RESTORIL) 30 mg capsule TAKE 1 CAPSULE BY MOUTH AT NIGHT AS NEEDED.    trazodone (DESYREL) 150 MG tablet Take 0.5 tablets (75 mg total) by mouth every evening.     TRUE METRIX GLUCOSE TEST STRIP Strp TEST BLOOD SUGAR THREE TIMES DAILY    TRUEPLUS LANCETS 33 gauge Misc USE THREE TIMES DAILY TO TEST BLOOD SUGAR     No current facility-administered medications for this visit.        Allergies   Allergen Reactions    Dilaudid [Hydromorphone] Anaphylaxis     Other reaction(s): Anaphylaxis  Other reaction(s): Unknown       Family History   Problem Relation Age of Onset    Arthritis Father     Heart disease Father     Early death Sister 30     heart disease    Heart disease Sister 32     MI    No Known Problems Brother     No Known Problems Daughter     Blindness Son      due to accident//    Arthritis Sister     Heart disease Sister     Crohn's disease Sister     Cancer Brother      asbestosis    Alcohol abuse Mother     Breast cancer Neg Hx     Glaucoma Neg Hx     Macular degeneration Neg Hx     Retinal detachment Neg Hx     Amblyopia Neg Hx     Diabetes Neg Hx     Cataracts Neg Hx     Hypertension Neg Hx     Strabismus Neg Hx     Stroke Neg Hx     Thyroid disease Neg Hx        Social History     Social History    Marital status:      Spouse name: N/A    Number of children: N/A    Years of education: N/A     Occupational History    Not on file.     Social History Main Topics    Smoking status: Current Every Day Smoker     Packs/day: 0.25     Years: 50.00     Types: Cigarettes     Start date: 1/19/1968    Smokeless tobacco: Former User     Quit date: 2/12/2017      Comment: 1 ppd for 20 years    Alcohol use No    Drug use: No    Sexual activity: Not Currently     Other Topics Concern    Not on file     Social History Narrative    No narrative on file       Chief Complaint:   Chief Complaint   Patient presents with    Injections     EUFLEXXA 2/3 BILATERAL KNEE       Consulting Physician: No ref. provider found    History of present illness: This is a 64-year-old young lady who reports bilateral knee pain.  Pain today 2/10.    Review of  Systems:    Constitution: Denies chills, fever, and sweats.    HENT: Denies headaches. Reports blurry vision.    Cardiovascular: Denies chest pain or irregular heart beat.    Respiratory: Denies cough. Reports shortness of breath.    Gastrointestinal: Denies abdominal pain, nausea, or vomiting.    Musculoskeletal:  Denies muscle cramps.    Neurological: Denies dizziness or focal weakness.    Psychiatric/Behavioral: Normal mental status. Anxiety.    Hematologic/Lymphatic: Denies bleeding problem or easy bruising/bleeding.    Skin: Denies rash or suspicious lesions.      Examination:    Vital Signs:    There were no vitals filed for this visit.    Body mass index is 43.68 kg/m².    This a well-developed, well nourished patient in no acute distress.    Alert and oriented and cooperative to examination.       Physical Exam: Left Knee Exam    Gait   antalgic    Skin  Rash:   None  Scars:   None    Inspection  Erythema:  None  Ecchymosis:  None  Effusion:  Mild  Masses:  None  Lymphadenopathy: None    Range of Motion: Full - 0 to 130°    Medial Joint : Mild  Lateral Joint : Mild    Patellofemoral Tenderness: None  Patellofemoral Crepitus: None    Lachman:  Normal  Anterior Drawer: Normal  Posterior Drawer: Normal    Dilia's:  Negative  Apley's:  Negative    Varus Stress:  Stable  Valgus Stress: Stable    Strength:  5/5    Pulses:  Palpable distal    Sensation:  Intact      Right Knee Exam    Gait:   Antalgic    Skin  Rash:   None  Scars:   None    Inspection  Erythema:  None  Ecchymosis:  None  Effusion:  Mild  Masses:  None  Lymphadenopathy: None    Range of Motion: Full - 0 to 130°    Medial Joint : Mild  Lateral Joint : Mild    Patellofemoral Tenderness: None  Patellofemoral Crepitus: None    Lachman:  Normal  Anterior Drawer: Normal  Posterior Drawer: Normal    Dilia's:  Negative  Apley's:  Negative    Varus Stress:  Stable  Valgus  Stress: Stable    Strength:  5/5    Pulses:  Palpable distal    Sensation:  Intact          Imaging: X-rays of both knees reveals some degenerative changes that are moderate.     Assessment: Primary osteoarthritis of both knees  -     Large Joint Aspiration/Injection        Plan: Euflexxa series    DISCLAIMER: This note may have been dictated using voice recognition software and may contain grammatical errors. Report sent to referring provider as required.

## 2017-11-22 ENCOUNTER — OUTPATIENT CASE MANAGEMENT (OUTPATIENT)
Dept: ADMINISTRATIVE | Facility: OTHER | Age: 67
End: 2017-11-22

## 2017-11-22 NOTE — PROGRESS NOTES
11/22/17- Called and patient is just driving up in her car. Daughter asked me to call her back. Called back and phone just rings, unable to leave a voice mail.

## 2017-12-01 ENCOUNTER — OFFICE VISIT (OUTPATIENT)
Dept: ORTHOPEDICS | Facility: CLINIC | Age: 67
End: 2017-12-01
Payer: MEDICARE

## 2017-12-01 VITALS — WEIGHT: 287.25 LBS | BODY MASS INDEX: 43.53 KG/M2 | HEIGHT: 68 IN

## 2017-12-01 DIAGNOSIS — M17.0 PRIMARY OSTEOARTHRITIS OF BOTH KNEES: Primary | ICD-10-CM

## 2017-12-01 PROCEDURE — 99499 UNLISTED E&M SERVICE: CPT | Mod: S$GLB,,, | Performed by: ORTHOPAEDIC SURGERY

## 2017-12-01 PROCEDURE — 20610 DRAIN/INJ JOINT/BURSA W/O US: CPT | Mod: 50,S$GLB,, | Performed by: ORTHOPAEDIC SURGERY

## 2017-12-01 PROCEDURE — 99999 PR PBB SHADOW E&M-EST. PATIENT-LVL II: CPT | Mod: PBBFAC,,, | Performed by: ORTHOPAEDIC SURGERY

## 2017-12-01 RX ORDER — HYALURONATE SODIUM 20 MG/2 ML
20 SYRINGE (ML) INTRAARTICULAR
Status: DISCONTINUED | OUTPATIENT
Start: 2017-12-01 | End: 2017-12-01 | Stop reason: HOSPADM

## 2017-12-01 RX ADMIN — Medication 20 MG: at 10:12

## 2017-12-01 NOTE — PROCEDURES
Large Joint Aspiration/Injection  Date/Time: 12/1/2017 10:23 AM  Performed by: DEE WILLIAMSON  Authorized by: DEE WILLIAMSON     Consent Done?:  Yes (Verbal)  Indications:  Pain  Timeout: Prior to procedure the correct patient, procedure, and site was verified      Location:  Knee  Site:  R knee and L knee  Prep: Patient was prepped and draped in usual sterile fashion    Approach:  Anteromedial  Medications:  20 mg EUFLEXXA 10 mg/mL(mw 2.4 -3.6 million); 20 mg EUFLEXXA 10 mg/mL(mw 2.4 -3.6 million)

## 2017-12-01 NOTE — PROGRESS NOTES
Past Medical History:   Diagnosis Date    *Atrial flutter     Angina pectoris 2017    Anxiety     Arthritis     Asthma     Atrial fibrillation     Back pain     Cataract     OD    CHF (congestive heart failure)     COPD (chronic obstructive pulmonary disease)     Depression     Diabetes mellitus     Emphysema of lung     Heart failure     Hernia     Hypercapnic respiratory failure, chronic 2016    Hyperlipidemia     Hypertension     Iron deficiency anemia 2/3/2016    Myocardial infarction     Obesity     Peripheral vascular disease     Pneumonia     Polyneuropathy     Retinal detachment     OS    Septic shock 2017    Skin ulcer 3/18/2017    Tobacco dependence     Type II 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, not stated as uncontrolled(250.60)        Past Surgical History:   Procedure Laterality Date    BREAST BIOPSY Left 10 yrs ago    benign    CATARACT EXTRACTION Left     OS      SECTION      x2    EYE SURGERY      HYSTERECTOMY      OOPHORECTOMY      one ovary conserved    RETINAL DETACHMENT SURGERY      buckle --OS    TONSILLECTOMY         Current Outpatient Prescriptions   Medication Sig    albuterol (VENTOLIN HFA) 90 mcg/actuation inhaler INHALE TWO PUFFS EVERY 6 HOURS AS NEEDED FOR WHEEZING    albuterol-ipratropium 2.5mg-0.5mg/3mL (DUO-NEB) 0.5 mg-3 mg(2.5 mg base)/3 mL nebulizer solution INHALE THE CONTENTS OF 1 VIAL VIA NEBULIZER EVERY 6 HOURS AS NEEDED FOR  WHEEZING (DO NOT USE WITH ALBUTEROL INHALER)    apixaban 5 mg Tab Take 1 tablet (5 mg total) by mouth 2 (two) times daily.    ascorbic acid, vitamin C, (VITAMIN C) 500 MG tablet Take 1 tablet (500 mg total) by mouth every evening.    atorvastatin (LIPITOR) 20 MG tablet TAKE 1 TABLET (20 MG TOTAL) BY MOUTH ONCE DAILY.    budesonide (PULMICORT) 0.5 mg/2 mL nebulizer solution Take 3 mLs by nebulization once daily.    cephALEXin (KEFLEX) 500 MG capsule Take  500 mg by mouth 4 (four) times daily.    clonazePAM (KLONOPIN) 1 MG tablet 1/2 to1 tab at night    furosemide (LASIX) 40 MG tablet TAKE ONE TABLET TWICE A DAYFOR FLUID OVERLOAD    gabapentin (NEURONTIN) 600 MG tablet TAKE 1 TABLET THREE TIMES DAILY    gabapentin (NEURONTIN) 800 MG tablet Take 1 tablet (800 mg total) by mouth 3 (three) times daily.    hydrocodone-acetaminophen 10-325mg (NORCO)  mg Tab Take 1 tablet by mouth every 8 (eight) hours as needed for Pain.    [START ON 12/7/2017] hydrocodone-acetaminophen 10-325mg (NORCO)  mg Tab Take 1 tablet by mouth every 8 (eight) hours as needed for Pain.    [START ON 1/8/2018] hydrocodone-acetaminophen 10-325mg (NORCO)  mg Tab Take 1 tablet by mouth every 8 (eight) hours as needed for Pain.    levalbuterol (XOPENEX) 0.63 mg/3 mL nebulizer solution INHALE THE CONTENTS OF 1 VIAL BY NEBULIZATION 3 (THREE) TIMES DAILY.    lisinopril (PRINIVIL,ZESTRIL) 20 MG tablet TAKE 1 TABLET EVERY DAY    magnesium oxide (MAG-OX) 400 mg tablet Take 1 tablet (400 mg total) by mouth 2 (two) times daily.    metFORMIN (GLUCOPHAGE) 500 MG tablet TAKE 1 TABLET TWICE DAILY WITH MEALS    metoprolol succinate (TOPROL-XL) 25 MG 24 hr tablet TAKE 1/2 TABLET EVERY DAY    multivitamin (THERAGRAN) tablet Take 1 tablet by mouth once daily.    nystatin (MYCOSTATIN) cream Apply topically 2 (two) times daily.    nystatin (MYCOSTATIN) powder Apply topically 4 (four) times daily.    nystatin-triamcinolone (MYCOLOG II) cream APPLY SPARINGLY TO AFFECTED AREA(S) 3 TIMES DAILY AS NEEDED    polyethylene glycol (GLYCOLAX) 17 gram/dose powder MIX 1 CAPFUL (17GM) IN LIQUID AND DRINK BEFORE BREAKFAST    potassium chloride SA (K-DUR,KLOR-CON) 20 MEQ tablet Take 1 tablet (20 mEq total) by mouth once daily.    temazepam (RESTORIL) 30 mg capsule TAKE 1 CAPSULE BY MOUTH AT NIGHT AS NEEDED.    trazodone (DESYREL) 150 MG tablet Take 0.5 tablets (75 mg total) by mouth every evening.     TRUE METRIX GLUCOSE TEST STRIP Strp TEST BLOOD SUGAR THREE TIMES DAILY    TRUEPLUS LANCETS 33 gauge Misc USE THREE TIMES DAILY TO TEST BLOOD SUGAR     No current facility-administered medications for this visit.        Allergies   Allergen Reactions    Dilaudid [Hydromorphone] Anaphylaxis     Other reaction(s): Anaphylaxis  Other reaction(s): Unknown       Family History   Problem Relation Age of Onset    Arthritis Father     Heart disease Father     Early death Sister 30     heart disease    Heart disease Sister 32     MI    No Known Problems Brother     No Known Problems Daughter     Blindness Son      due to accident//    Arthritis Sister     Heart disease Sister     Crohn's disease Sister     Cancer Brother      asbestosis    Alcohol abuse Mother     Breast cancer Neg Hx     Glaucoma Neg Hx     Macular degeneration Neg Hx     Retinal detachment Neg Hx     Amblyopia Neg Hx     Diabetes Neg Hx     Cataracts Neg Hx     Hypertension Neg Hx     Strabismus Neg Hx     Stroke Neg Hx     Thyroid disease Neg Hx        Social History     Social History    Marital status:      Spouse name: N/A    Number of children: N/A    Years of education: N/A     Occupational History    Not on file.     Social History Main Topics    Smoking status: Current Every Day Smoker     Packs/day: 0.25     Years: 50.00     Types: Cigarettes     Start date: 1/19/1968    Smokeless tobacco: Former User     Quit date: 2/12/2017      Comment: 1 ppd for 20 years    Alcohol use No    Drug use: No    Sexual activity: Not Currently     Other Topics Concern    Not on file     Social History Narrative    No narrative on file       Chief Complaint:   Chief Complaint   Patient presents with    Injections     Bilateral knee Euflexxa #3       Consulting Physician: No ref. provider found    History of present illness: This is a 64-year-old young lady who reports bilateral knee pain.  Pain today 2/10.    Review of  Systems:    Constitution: Denies chills, fever, and sweats.    HENT: Denies headaches. Reports blurry vision.    Cardiovascular: Denies chest pain or irregular heart beat.    Respiratory: Denies cough. Reports shortness of breath.    Gastrointestinal: Denies abdominal pain, nausea, or vomiting.    Musculoskeletal:  Denies muscle cramps.    Neurological: Denies dizziness or focal weakness.    Psychiatric/Behavioral: Normal mental status. Anxiety.    Hematologic/Lymphatic: Denies bleeding problem or easy bruising/bleeding.    Skin: Denies rash or suspicious lesions.      Examination:    Vital Signs:    There were no vitals filed for this visit.    Body mass index is 43.68 kg/m².    This a well-developed, well nourished patient in no acute distress.    Alert and oriented and cooperative to examination.       Physical Exam: Left Knee Exam    Gait   antalgic    Skin  Rash:   None  Scars:   None    Inspection  Erythema:  None  Ecchymosis:  None  Effusion:  Mild  Masses:  None  Lymphadenopathy: None    Range of Motion: Full - 0 to 130°    Medial Joint : Mild  Lateral Joint : Mild    Patellofemoral Tenderness: None  Patellofemoral Crepitus: None    Lachman:  Normal  Anterior Drawer: Normal  Posterior Drawer: Normal    Dilia's:  Negative  Apley's:  Negative    Varus Stress:  Stable  Valgus Stress: Stable    Strength:  5/5    Pulses:  Palpable distal    Sensation:  Intact      Right Knee Exam    Gait:   Antalgic    Skin  Rash:   None  Scars:   None    Inspection  Erythema:  None  Ecchymosis:  None  Effusion:  Mild  Masses:  None  Lymphadenopathy: None    Range of Motion: Full - 0 to 130°    Medial Joint : Mild  Lateral Joint : Mild    Patellofemoral Tenderness: None  Patellofemoral Crepitus: None    Lachman:  Normal  Anterior Drawer: Normal  Posterior Drawer: Normal    Dilia's:  Negative  Apley's:  Negative    Varus Stress:  Stable  Valgus  Stress: Stable    Strength:  5/5    Pulses:  Palpable distal    Sensation:  Intact          Imaging: X-rays of both knees reveals some degenerative changes that are moderate.     Assessment: Primary osteoarthritis of both knees  -     Large Joint Aspiration/Injection        Plan: Euflexxa series    DISCLAIMER: This note may have been dictated using voice recognition software and may contain grammatical errors. Report sent to referring provider as required.

## 2017-12-03 RX ORDER — NYSTATIN 100000 [USP'U]/G
POWDER TOPICAL
Qty: 120 G | Refills: 3 | Status: SHIPPED | OUTPATIENT
Start: 2017-12-03 | End: 2018-04-23 | Stop reason: SDUPTHER

## 2017-12-06 ENCOUNTER — OUTPATIENT CASE MANAGEMENT (OUTPATIENT)
Dept: ADMINISTRATIVE | Facility: OTHER | Age: 67
End: 2017-12-06

## 2017-12-06 NOTE — PROGRESS NOTES
12/06/17- Called and spoke to patient. Patient states that everything is fine. Her diabetes and blood pressure is all good. She does not need OPCM at this time. Patient knows to call me in the future if she needs me. Will close case and dis-enroll patient from OPCM.

## 2017-12-13 RX ORDER — NYSTATIN AND TRIAMCINOLONE ACETONIDE 100000; 1 [USP'U]/G; MG/G
CREAM TOPICAL
Qty: 30 G | Refills: 1 | Status: SHIPPED | OUTPATIENT
Start: 2017-12-13 | End: 2018-03-23 | Stop reason: SDUPTHER

## 2017-12-15 RX ORDER — ESCITALOPRAM OXALATE 20 MG/1
TABLET ORAL
Qty: 90 TABLET | Refills: 3 | Status: SHIPPED | OUTPATIENT
Start: 2017-12-15 | End: 2017-12-28

## 2017-12-18 ENCOUNTER — DOCUMENTATION ONLY (OUTPATIENT)
Dept: FAMILY MEDICINE | Facility: CLINIC | Age: 67
End: 2017-12-18

## 2017-12-18 NOTE — PROGRESS NOTES
Pre-Visit Chart Review  For Appointment Scheduled on 12/19/2017      Health Maintenance Due   Topic Date Due    Zoster Vaccine  11/15/2011

## 2017-12-19 ENCOUNTER — LAB VISIT (OUTPATIENT)
Dept: LAB | Facility: HOSPITAL | Age: 67
End: 2017-12-19
Attending: INTERNAL MEDICINE
Payer: MEDICARE

## 2017-12-19 ENCOUNTER — OFFICE VISIT (OUTPATIENT)
Dept: FAMILY MEDICINE | Facility: CLINIC | Age: 67
End: 2017-12-19
Payer: MEDICARE

## 2017-12-19 VITALS
WEIGHT: 290.13 LBS | TEMPERATURE: 98 F | DIASTOLIC BLOOD PRESSURE: 63 MMHG | HEIGHT: 68 IN | SYSTOLIC BLOOD PRESSURE: 115 MMHG | HEART RATE: 62 BPM | BODY MASS INDEX: 43.97 KG/M2

## 2017-12-19 DIAGNOSIS — F19.20 DEPENDENCY ON PAIN MEDICATION: ICD-10-CM

## 2017-12-19 DIAGNOSIS — I48.20 CHRONIC ATRIAL FIBRILLATION: ICD-10-CM

## 2017-12-19 DIAGNOSIS — K21.9 GASTROESOPHAGEAL REFLUX DISEASE, ESOPHAGITIS PRESENCE NOT SPECIFIED: ICD-10-CM

## 2017-12-19 DIAGNOSIS — J43.9 MIXED RESTRICTIVE AND OBSTRUCTIVE LUNG DISEASE: ICD-10-CM

## 2017-12-19 DIAGNOSIS — Z00.00 ENCOUNTER FOR PREVENTIVE HEALTH EXAMINATION: Primary | ICD-10-CM

## 2017-12-19 DIAGNOSIS — F41.9 ANXIETY: ICD-10-CM

## 2017-12-19 DIAGNOSIS — E78.5 HYPERLIPIDEMIA ASSOCIATED WITH TYPE 2 DIABETES MELLITUS: ICD-10-CM

## 2017-12-19 DIAGNOSIS — D50.9 IRON DEFICIENCY ANEMIA, UNSPECIFIED IRON DEFICIENCY ANEMIA TYPE: ICD-10-CM

## 2017-12-19 DIAGNOSIS — J44.9 CHRONIC OBSTRUCTIVE PULMONARY DISEASE, UNSPECIFIED COPD TYPE: ICD-10-CM

## 2017-12-19 DIAGNOSIS — I50.42 CHRONIC COMBINED SYSTOLIC AND DIASTOLIC CONGESTIVE HEART FAILURE: ICD-10-CM

## 2017-12-19 DIAGNOSIS — J98.4 MIXED RESTRICTIVE AND OBSTRUCTIVE LUNG DISEASE: ICD-10-CM

## 2017-12-19 DIAGNOSIS — E66.01 MORBID OBESITY WITH BMI OF 40.0-44.9, ADULT: ICD-10-CM

## 2017-12-19 DIAGNOSIS — E11.59 HYPERTENSION ASSOCIATED WITH DIABETES: ICD-10-CM

## 2017-12-19 DIAGNOSIS — J96.12 HYPERCAPNIC RESPIRATORY FAILURE, CHRONIC: ICD-10-CM

## 2017-12-19 DIAGNOSIS — I70.0 ABDOMINAL AORTIC ATHEROSCLEROSIS: ICD-10-CM

## 2017-12-19 DIAGNOSIS — E11.69 HYPERLIPIDEMIA ASSOCIATED WITH TYPE 2 DIABETES MELLITUS: ICD-10-CM

## 2017-12-19 DIAGNOSIS — I15.2 HYPERTENSION ASSOCIATED WITH DIABETES: ICD-10-CM

## 2017-12-19 DIAGNOSIS — E11.40 TYPE 2 DIABETES MELLITUS WITH DIABETIC NEUROPATHY, WITHOUT LONG-TERM CURRENT USE OF INSULIN: Chronic | ICD-10-CM

## 2017-12-19 DIAGNOSIS — F33.9 MAJOR DEPRESSION, RECURRENT, CHRONIC: ICD-10-CM

## 2017-12-19 DIAGNOSIS — I73.9 PVD (PERIPHERAL VASCULAR DISEASE): ICD-10-CM

## 2017-12-19 DIAGNOSIS — E11.8 TYPE 2 DIABETES MELLITUS WITH COMPLICATION, WITHOUT LONG-TERM CURRENT USE OF INSULIN: ICD-10-CM

## 2017-12-19 DIAGNOSIS — I20.9 ANGINA PECTORIS: ICD-10-CM

## 2017-12-19 DIAGNOSIS — M51.36 DDD (DEGENERATIVE DISC DISEASE), LUMBAR: Chronic | ICD-10-CM

## 2017-12-19 PROBLEM — R07.9 CHEST PAIN: Status: RESOLVED | Noted: 2017-09-17 | Resolved: 2017-12-19

## 2017-12-19 PROBLEM — L08.9 INFECTED WOUND: Status: RESOLVED | Noted: 2017-04-05 | Resolved: 2017-12-19

## 2017-12-19 PROBLEM — J96.10 CHRONIC RESPIRATORY FAILURE: Status: RESOLVED | Noted: 2017-04-23 | Resolved: 2017-12-19

## 2017-12-19 PROBLEM — I50.9 CHF (CONGESTIVE HEART FAILURE): Status: ACTIVE | Noted: 2017-12-19

## 2017-12-19 PROBLEM — K59.00 CONSTIPATION: Status: RESOLVED | Noted: 2017-04-10 | Resolved: 2017-12-19

## 2017-12-19 PROBLEM — B37.2 YEAST DERMATITIS: Status: RESOLVED | Noted: 2017-02-18 | Resolved: 2017-12-19

## 2017-12-19 PROBLEM — L89.93 PRESSURE ULCER, STAGE 3: Status: RESOLVED | Noted: 2017-03-19 | Resolved: 2017-12-19

## 2017-12-19 PROBLEM — T14.8XXA INFECTED WOUND: Status: RESOLVED | Noted: 2017-04-05 | Resolved: 2017-12-19

## 2017-12-19 PROBLEM — L98.491: Status: RESOLVED | Noted: 2017-03-18 | Resolved: 2017-12-19

## 2017-12-19 PROBLEM — E66.9 DIABETES MELLITUS TYPE 2 IN OBESE: Status: RESOLVED | Noted: 2017-07-05 | Resolved: 2017-12-19

## 2017-12-19 LAB
BASOPHILS # BLD AUTO: 0.1 K/UL
BASOPHILS NFR BLD: 1 %
DIFFERENTIAL METHOD: ABNORMAL
EOSINOPHIL # BLD AUTO: 0.3 K/UL
EOSINOPHIL NFR BLD: 2.7 %
ERYTHROCYTE [DISTWIDTH] IN BLOOD BY AUTOMATED COUNT: 15.8 %
FERRITIN SERPL-MCNC: 29 NG/ML
HCT VFR BLD AUTO: 45.5 %
HGB BLD-MCNC: 14.9 G/DL
IRON SERPL-MCNC: 51 UG/DL
LYMPHOCYTES # BLD AUTO: 1.9 K/UL
LYMPHOCYTES NFR BLD: 18.4 %
MCH RBC QN AUTO: 29.5 PG
MCHC RBC AUTO-ENTMCNC: 32.7 G/DL
MCV RBC AUTO: 90 FL
MONOCYTES # BLD AUTO: 0.9 K/UL
MONOCYTES NFR BLD: 8.4 %
NEUTROPHILS # BLD AUTO: 7.3 K/UL
NEUTROPHILS NFR BLD: 69.5 %
PLATELET # BLD AUTO: 193 K/UL
PMV BLD AUTO: 9.4 FL
RBC # BLD AUTO: 5.03 M/UL
SATURATED IRON: 11 %
TOTAL IRON BINDING CAPACITY: 480 UG/DL
TRANSFERRIN SERPL-MCNC: 324 MG/DL
WBC # BLD AUTO: 10.5 K/UL

## 2017-12-19 PROCEDURE — 85025 COMPLETE CBC W/AUTO DIFF WBC: CPT

## 2017-12-19 PROCEDURE — G0439 PPPS, SUBSEQ VISIT: HCPCS | Mod: S$GLB,,, | Performed by: PHYSICIAN ASSISTANT

## 2017-12-19 PROCEDURE — 36415 COLL VENOUS BLD VENIPUNCTURE: CPT

## 2017-12-19 PROCEDURE — 82728 ASSAY OF FERRITIN: CPT

## 2017-12-19 PROCEDURE — 99499 UNLISTED E&M SERVICE: CPT | Mod: S$GLB,,, | Performed by: PHYSICIAN ASSISTANT

## 2017-12-19 PROCEDURE — 83540 ASSAY OF IRON: CPT

## 2017-12-19 PROCEDURE — 99999 PR PBB SHADOW E&M-EST. PATIENT-LVL III: CPT | Mod: PBBFAC,,, | Performed by: PHYSICIAN ASSISTANT

## 2017-12-19 NOTE — PROGRESS NOTES
"Nelly Tian presented for a  Medicare AWV and comprehensive Health Risk Assessment today. The following components were reviewed and updated:    · Medical history  · Family History  · Social history  · Allergies and Current Medications  · Health Risk Assessment  · Health Maintenance  · Care Team     ** See Completed Assessments for Annual Wellness Visit within the encounter summary.**       The following assessments were completed:  · Living Situation  · CAGE  · Depression Screening  · Timed Get Up and Go  · Whisper Test  · Cognitive Function Screening  · Nutrition Screening  · ADL Screening  · PAQ Screening    Vitals:    12/19/17 1200   BP: 115/63   BP Location: Left arm   Patient Position: Sitting   BP Method: Small (Automatic)   Pulse: 62   Temp: 97.8 °F (36.6 °C)   TempSrc: Oral   Weight: 131.6 kg (290 lb 2 oz)   Height: 5' 8" (1.727 m)     Body mass index is 44.11 kg/m².  Physical Exam   Constitutional: She is oriented to person, place, and time. She appears well-developed and well-nourished.   HENT:   Head: Normocephalic and atraumatic.   Eyes: Conjunctivae are normal. Pupils are equal, round, and reactive to light.   Neck: Normal range of motion. Neck supple. No JVD present.   Cardiovascular: Normal rate and regular rhythm.  Exam reveals no gallop and no friction rub.    No murmur heard.  Pulmonary/Chest: Effort normal and breath sounds normal. No respiratory distress. She has no wheezes. She has no rales.   Neurological: She is alert and oriented to person, place, and time.   Skin: Skin is warm and dry.   Psychiatric: She has a normal mood and affect. Her behavior is normal. Judgment and thought content normal.               Diagnoses and health risks identified today and associated recommendations/orders:    Nelly was seen today for health risk assessment.    Diagnoses and all orders for this visit:    Encounter for preventive health examination    Chronic atrial fibrillation  Comments:  stable, " continue to monitor    Hypertension associated with diabetes  Comments:  controlled, continue meds    Morbid obesity with BMI of 40.0-44.9, adult  Comments:  uncontrolled, encouraged weight loss    PVD (peripheral vascular disease)  Comments:  stable, continue to mointor    Abdominal aortic atherosclerosis  Comments:  stable, continue to monitor    Type 2 diabetes mellitus with complication, without long-term current use of insulin  Comments:  controlled, continue meds    Hyperlipidemia associated with type 2 diabetes mellitus  Comments:  uncontrolled, continue meds    Mixed restrictive and obstructive lung disease  Comments:  stable, continue to monitor    Hypercapnic respiratory failure, chronic  Comments:  chronic, continue regimen    Chronic obstructive pulmonary disease, unspecified COPD type  Comments:  stable, continue regimen    Angina pectoris  Comments:  stable, followed by cardiology    Type 2 diabetes mellitus with diabetic neuropathy, without long-term current use of insulin  Comments:  controlled, continue meds    Major depression, recurrent, chronic  Comments:  controlled, continue meds    Dependency on pain medication  Comments:  stable, followd by pcp    Chronic combined systolic and diastolic congestive heart failure  Comments:  stable, followed by urology    Iron deficiency anemia, unspecified iron deficiency anemia type  Comments:  stable, continue to monitor    Anxiety  Comments:  stable, continue to monitor    DDD (degenerative disc disease), lumbar  Comments:  stable, continue to monitor    Gastroesophageal reflux disease, esophagitis presence not specified  Comments:  controlled, continue meds        Provided Nelly with a 5-10 year written screening schedule and personal prevention plan. Recommendations were developed using the USPSTF age appropriate recommendations. Education, counseling, and referrals were provided as needed. After Visit Summary printed and given to patient which  includes a list of additional screenings\tests needed.    No Follow-up on file.    JIGNESH Frank     Patient readiness: acceptance and barriers:none    During the course of the visit the patient was educated and counseled about the following:     Diabetes:  Discussed general issues about diabetes pathophysiology and management.  Hypertension:   Regular aerobic exercise.  Obesity:   General weight loss/lifestyle modification strategies discussed (elicit support from others; identify saboteurs; non-food rewards, etc).    Goals: Diabetes: Maintain Hemoglobin A1C below 7, Hypertension: Reduce Blood Pressure and Obesity: Reduce calorie intake and BMI    Did patient meet goals/outcomes: No    The following self management tools provided: blood pressure log  blood glucose log  excercise log    Patient Instructions (the written plan) was given to the patient/family.     Time spent with patient: 55 minutes    Barriers to medications present (no )    Adverse reactions to current medications (no)    Over the counter medications reviewed (Yes)

## 2017-12-19 NOTE — PATIENT INSTRUCTIONS
Counseling and Referral of Other Preventative  (Italic type indicates deductible and co-insurance are waived)    Patient Name: Nelly Tian  Today's Date: 12/19/2017      SERVICE LIMITATIONS RECOMMENDATION    Vaccines    · Pneumococcal (once after 65)    · Influenza (annually)    · Hepatitis B (if medium/high risk)    · Prevnar 13      Hepatitis B medium/high risk factors:       - End-stage renal disease       - Hemophiliacs who received Factor VII or         IX concentrates       - Clients of institutions for the mentally             retarded       - Persons who live in the same house as          a HepB carrier       - Homosexual men       - Illicit injectable drug abusers     Pneumococcal: Done, no repeat necessary     Influenza: Done, repeat in one year     Hepatitis B: N/A     Prevnar 13: Done, no repeat necessary    Mammogram (biennial age 50-74)  Annually (age 40 or over)  Done this year, repeat every year    Pap (up to age 70 and after 70 if unknown history or abnormal study last 10 years)    N/A     The USPSTF recommends against screening for cervical cancer in women who have had a hysterectomy with removal of the cervix and who do not have a history of a high-grade precancerous lesion (cervical intraepithelial neoplasia [HENRY] grade 2 or 3) or cervical cancer.     Colorectal cancer screening (to age 75)    · Fecal occult blood test (annual)  · Flexible sigmoidoscopy (5y)  · Screening colonoscopy (10y)  · Barium enema   Last done 2/01/2011, recommend to repeat every 10  years    Diabetes self-management training (no USPSTF recommendations)  Requires referral by treating physician for patient with diabetes or renal disease. 10 hours of initial DSMT sessions of no less than 30 minutes each in a continuous 12-month period. 2 hours of follow-up DSMT in subsequent years.  Done this year, repeat every year    Bone mass measurements (age 65 & older, biennial)  Requires diagnosis related to osteoporosis or  estrogen deficiency. Biennial benefit unless patient has history of long-term glucocorticoid  Last done 7/14/2016, recommend to repeat every 3  years    Glaucoma screening (no USPSTF recommendation)  Diabetes mellitus, family history   , age 50 or over    American, age 65 or over  Scheduled, see appointments    Medical nutrition therapy for diabetes or renal disease (no recommended schedule)  Requires referral by treating physician for patient with diabetes or renal disease or kidney transplant within the past 3 years.  Can be provided in same year as diabetes self-management training (DSMT), and CMS recommends medical nutrition therapy take place after DSMT. Up to 3 hours for initial year and 2 hours in subsequent years.  Done this year, repeat every year    Cardiovascular screening blood tests (every 5 years)  · Fasting lipid panel  Order as a panel if possible  Done this year, repeat every year    Diabetes screening tests (at least every 3 years, Medicare covers annually or at 6-month intervals for prediabetic patients)  · Fasting blood sugar (FBS) or glucose tolerance test (GTT)  Patient must be diagnosed with one of the following:       - Hypertension       - Dyslipidemia       - Obesity (BMI 30kg/m2)       - Previous elevated impaired FBS or GTT       ... or any two of the following:       - Overweight (BMI 25 but <30)       - Family history of diabetes       - Age 65 or older       - History of gestational diabetes or birth of baby weighing more than 9 pounds  Done this year, repeat every year    HIV screening (annually for increased risk patients)  · HIV-1 and HIV-2 by EIA, or ANTHONY, rapid antibody test or oral mucosa transudate  Patients must be at increased risk for HIV infection per USPSTF guidelines or pregnant. Tests covered annually for patient at increased risk or as requested by the patient. Pregnant patients may receive up to 3 tests during pregnancy.  Risks discussed,  screening is not recommended    Smoking cessation counseling (up to 8 sessions per year)  Patients must be asymptomatic of tobacco-related conditions to receive as a preventative service.  declined    Subsequent annual wellness visit  At least 12 months since last AWV  Return in one year     The following information is provided to all patients.  This information is to help you find resources for any of the problems found today that may be affecting your health:                Living healthy guide: www.[facility].[place].[facility]      Understanding Diabetes: www.diabetes.org      Eating healthy: www.cdc.gov/healthyweight      [facility] home safety checklist: www.cdc.gov/steadi/patient.html      Agency on Aging: www.goea.[place].[facility]      Alcoholics anonymous (AA): www.aa.org      Physical Activity: www.mary [place].nih.gov/ew0stix      Tobacco use: www.quitwithusla.org

## 2017-12-27 ENCOUNTER — TELEPHONE (OUTPATIENT)
Dept: FAMILY MEDICINE | Facility: CLINIC | Age: 67
End: 2017-12-27

## 2017-12-27 NOTE — TELEPHONE ENCOUNTER
Patient calling stating if something could be called in for cold/congestion. Informed patient she must be seen for proper evaluation and treatment. Pt. Verbalized understanding. Appointment scheduled.

## 2017-12-27 NOTE — TELEPHONE ENCOUNTER
----- Message from Royce Foss sent at 12/27/2017  1:49 PM CST -----  Contact: pt  Pt is calling to see if she can get something called in for a chest cold and cough  Call Back#[phone]  Thanks    [facility] - [place] - [address]  [address] [place] [address]  Phone: [phone] Fax: [phone]

## 2017-12-28 ENCOUNTER — OFFICE VISIT (OUTPATIENT)
Dept: FAMILY MEDICINE | Facility: CLINIC | Age: 67
End: 2017-12-28
Payer: MEDICARE

## 2017-12-28 VITALS
BODY MASS INDEX: 44.41 KG/M2 | OXYGEN SATURATION: 93 % | HEIGHT: 68 IN | SYSTOLIC BLOOD PRESSURE: 118 MMHG | DIASTOLIC BLOOD PRESSURE: 59 MMHG | TEMPERATURE: 98 F | RESPIRATION RATE: 18 BRPM | WEIGHT: 293 LBS | HEART RATE: 90 BPM

## 2017-12-28 DIAGNOSIS — B96.89 ACUTE BACTERIAL BRONCHITIS: Primary | ICD-10-CM

## 2017-12-28 DIAGNOSIS — J20.8 ACUTE BACTERIAL BRONCHITIS: Primary | ICD-10-CM

## 2017-12-28 DIAGNOSIS — J44.1 ACUTE EXACERBATION OF CHRONIC OBSTRUCTIVE PULMONARY DISEASE (COPD): ICD-10-CM

## 2017-12-28 PROCEDURE — 96372 THER/PROPH/DIAG INJ SC/IM: CPT | Mod: S$GLB,,, | Performed by: FAMILY MEDICINE

## 2017-12-28 PROCEDURE — 99214 OFFICE O/P EST MOD 30 MIN: CPT | Mod: 25,S$GLB,, | Performed by: FAMILY MEDICINE

## 2017-12-28 PROCEDURE — 99999 PR PBB SHADOW E&M-EST. PATIENT-LVL III: CPT | Mod: PBBFAC,,, | Performed by: FAMILY MEDICINE

## 2017-12-28 PROCEDURE — 99499 UNLISTED E&M SERVICE: CPT | Mod: S$GLB,,, | Performed by: FAMILY MEDICINE

## 2017-12-28 RX ORDER — DOXYCYCLINE 100 MG/1
100 CAPSULE ORAL 2 TIMES DAILY
Qty: 20 CAPSULE | Refills: 0 | Status: SHIPPED | OUTPATIENT
Start: 2017-12-28 | End: 2018-06-02

## 2017-12-28 RX ORDER — BUDESONIDE 0.5 MG/2ML
0.75 INHALANT ORAL DAILY
Qty: 2 ML | Refills: 3 | Status: SHIPPED | OUTPATIENT
Start: 2017-12-28 | End: 2018-06-02 | Stop reason: ALTCHOICE

## 2017-12-28 RX ORDER — LEVALBUTEROL INHALATION SOLUTION 0.63 MG/3ML
SOLUTION RESPIRATORY (INHALATION)
Qty: 792 ML | Refills: 3 | Status: SHIPPED | OUTPATIENT
Start: 2017-12-28 | End: 2018-03-12 | Stop reason: SDUPTHER

## 2017-12-28 RX ORDER — BETAMETHASONE SODIUM PHOSPHATE AND BETAMETHASONE ACETATE 3; 3 MG/ML; MG/ML
9 INJECTION, SUSPENSION INTRA-ARTICULAR; INTRALESIONAL; INTRAMUSCULAR; SOFT TISSUE
Status: COMPLETED | OUTPATIENT
Start: 2017-12-28 | End: 2017-12-28

## 2017-12-28 RX ADMIN — BETAMETHASONE SODIUM PHOSPHATE AND BETAMETHASONE ACETATE 9 MG: 3; 3 INJECTION, SUSPENSION INTRA-ARTICULAR; INTRALESIONAL; INTRAMUSCULAR; SOFT TISSUE at 10:12

## 2017-12-28 NOTE — PROGRESS NOTES
Subjective:       Patient ID: Nelly Tian is a 67 y.o. female.    Chief Complaint: URI (X 2 days with head/chest congestion, cough with greenish sputum, itching ears, wheezing, sob)    Cough   This is a new problem. The current episode started in the past 7 days. The problem has been gradually worsening. The problem occurs hourly. The cough is productive of purulent sputum. Associated symptoms include shortness of breath and wheezing. Pertinent negatives include no chest pain, fever or rash. She has tried a beta-agonist inhaler for the symptoms. The treatment provided mild relief. Her past medical history is significant for COPD.     Review of Systems   Constitutional: Negative for fever.   Respiratory: Positive for cough, shortness of breath and wheezing.    Cardiovascular: Negative for chest pain.   Gastrointestinal: Negative for abdominal pain and nausea.   Skin: Negative for rash.   All other systems reviewed and are negative.      Objective:      Physical Exam   Constitutional: She appears well-developed. No distress.   HENT:   Right Ear: Tympanic membrane is not erythematous.   Left Ear: Tympanic membrane is not erythematous.   Nose: Mucosal edema present. Right sinus exhibits no maxillary sinus tenderness. Left sinus exhibits no maxillary sinus tenderness.   Mouth/Throat: Posterior oropharyngeal erythema present.   Neck: Neck supple.   Cardiovascular: Normal rate and regular rhythm.    No murmur heard.  Pulmonary/Chest: Effort normal. She has decreased breath sounds. She has no wheezes. She has no rales.   Lymphadenopathy:     She has no cervical adenopathy.       Assessment:       1. Acute bacterial bronchitis    2. Acute exacerbation of chronic obstructive pulmonary disease (COPD)        Plan:         Nelly was seen today for uri.    Diagnoses and all orders for this visit:    Acute bacterial bronchitis    Acute exacerbation of chronic obstructive pulmonary disease (COPD)    Other orders  -      doxycycline (MONODOX) 100 MG capsule; Take 1 capsule (100 mg total) by mouth 2 (two) times daily.  -     budesonide (PULMICORT) 0.5 mg/2 mL nebulizer solution; Take 3 mLs (0.75 mg total) by nebulization once daily.  -     levalbuterol (XOPENEX) 0.63 mg/3 mL nebulizer solution; INHALE THE CONTENTS OF 1 VIAL BY NEBULIZATION 4 times  DAILY.  -     betamethasone acetate-betamethasone sodium phosphate injection 9 mg; Inject 1.5 mLs (9 mg total) into the muscle one time.

## 2018-01-02 RX ORDER — MELOXICAM 7.5 MG/1
TABLET ORAL
Qty: 90 TABLET | Refills: 1 | Status: SHIPPED | OUTPATIENT
Start: 2018-01-02 | End: 2018-06-02

## 2018-01-03 ENCOUNTER — OFFICE VISIT (OUTPATIENT)
Dept: HEMATOLOGY/ONCOLOGY | Facility: CLINIC | Age: 68
End: 2018-01-03
Payer: MEDICARE

## 2018-01-03 VITALS
SYSTOLIC BLOOD PRESSURE: 122 MMHG | WEIGHT: 142.63 LBS | DIASTOLIC BLOOD PRESSURE: 71 MMHG | HEART RATE: 77 BPM | BODY MASS INDEX: 21.62 KG/M2 | TEMPERATURE: 98 F | RESPIRATION RATE: 20 BRPM | HEIGHT: 68 IN

## 2018-01-03 DIAGNOSIS — R16.0 HEPATOMEGALY: ICD-10-CM

## 2018-01-03 DIAGNOSIS — R60.9 EDEMA, UNSPECIFIED TYPE: ICD-10-CM

## 2018-01-03 DIAGNOSIS — J40 BRONCHITIS: Primary | ICD-10-CM

## 2018-01-03 DIAGNOSIS — Z86.2 HISTORY OF ANEMIA: ICD-10-CM

## 2018-01-03 DIAGNOSIS — G62.9 POLYNEUROPATHY: ICD-10-CM

## 2018-01-03 PROCEDURE — 99999 PR PBB SHADOW E&M-EST. PATIENT-LVL III: CPT | Mod: PBBFAC,,, | Performed by: INTERNAL MEDICINE

## 2018-01-03 PROCEDURE — 99215 OFFICE O/P EST HI 40 MIN: CPT | Mod: S$GLB,,, | Performed by: INTERNAL MEDICINE

## 2018-01-03 PROCEDURE — 99499 UNLISTED E&M SERVICE: CPT | Mod: S$GLB,,, | Performed by: INTERNAL MEDICINE

## 2018-01-03 RX ORDER — METHYLPREDNISOLONE 4 MG/1
4 TABLET ORAL DAILY
Qty: 1 PACKAGE | Refills: 0 | Status: SHIPPED | OUTPATIENT
Start: 2018-01-03 | End: 2018-01-24 | Stop reason: ALTCHOICE

## 2018-01-03 NOTE — PROGRESS NOTES
FOLLOWUP    CHIEF COMPLAINT: I am sick    Ms. Tian is a 67-year-old female who has a history of progressive anemia after    IV iron.      Pt on antibiotic for cold since December 28 2017  Here today for routine labs for anemia  She remains with congestion, no fever but increasing fatigue   She is tolerating Glucophage for diabetes as well as Lexapro for depression and Zestril   for hypertension  Trazodone helping her sleep    Past Medical History:   Diagnosis Date    *Atrial flutter     Angina pectoris 9/18/2017    Anxiety     Arthritis     Asthma     Atrial fibrillation     Back pain     Cataract     OD    CHF (congestive heart failure)     COPD (chronic obstructive pulmonary disease)     Depression     Diabetes mellitus     Emphysema of lung     Heart failure     Hepatomegaly 2/3/2016    Hernia     History of MI (myocardial infarction) 1/19/2016    Hypercapnic respiratory failure, chronic 11/16/2016    Hyperlipidemia     Hypertension     Iron deficiency anemia 2/3/2016    Myocardial infarction     Obesity     Peripheral vascular disease     Pneumonia     Polyneuropathy     Retinal detachment     OS    Septic shock 4/23/2017    Skin ulcer 3/18/2017    Tobacco dependence     Type II 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, not stated as uncontrolled(250.60)          Current Outpatient Prescriptions:     albuterol (VENTOLIN HFA) 90 mcg/actuation inhaler, INHALE TWO PUFFS EVERY 6 HOURS AS NEEDED FOR WHEEZING, Disp: 18 g, Rfl: 0    albuterol-ipratropium 2.5mg-0.5mg/3mL (DUO-NEB) 0.5 mg-3 mg(2.5 mg base)/3 mL nebulizer solution, INHALE THE CONTENTS OF 1 VIAL VIA NEBULIZER EVERY 6 HOURS AS NEEDED FOR  WHEEZING (DO NOT USE WITH ALBUTEROL INHALER), Disp: 90 mL, Rfl: 4    apixaban 5 mg Tab, Take 1 tablet (5 mg total) by mouth 2 (two) times daily., Disp: 90 tablet, Rfl: 3    ascorbic acid, vitamin C, (VITAMIN C) 500 MG tablet, Take 1 tablet (500 mg total) by  mouth every evening., Disp: , Rfl:     atorvastatin (LIPITOR) 20 MG tablet, TAKE 1 TABLET (20 MG TOTAL) BY MOUTH ONCE DAILY., Disp: 90 tablet, Rfl: 3    budesonide (PULMICORT) 0.5 mg/2 mL nebulizer solution, Take 3 mLs (0.75 mg total) by nebulization once daily., Disp: 2 mL, Rfl: 3    cephALEXin (KEFLEX) 500 MG capsule, Take 500 mg by mouth every 12 (twelve) hours. , Disp: , Rfl:     clonazePAM (KLONOPIN) 1 MG tablet, 1/2 to1 tab at night, Disp: 30 tablet, Rfl: 3    doxycycline (MONODOX) 100 MG capsule, Take 1 capsule (100 mg total) by mouth 2 (two) times daily., Disp: 20 capsule, Rfl: 0    furosemide (LASIX) 40 MG tablet, TAKE ONE TABLET TWICE A DAYFOR FLUID OVERLOAD, Disp: 60 tablet, Rfl: 5    gabapentin (NEURONTIN) 800 MG tablet, Take 1 tablet (800 mg total) by mouth 3 (three) times daily., Disp: 270 tablet, Rfl: 3    hydrocodone-acetaminophen 10-325mg (NORCO)  mg Tab, Take 1 tablet by mouth every 8 (eight) hours as needed for Pain., Disp: 90 tablet, Rfl: 0    levalbuterol (XOPENEX) 0.63 mg/3 mL nebulizer solution, INHALE THE CONTENTS OF 1 VIAL BY NEBULIZATION 4 times  DAILY., Disp: 792 mL, Rfl: 3    lisinopril (PRINIVIL,ZESTRIL) 20 MG tablet, TAKE 1 TABLET EVERY DAY, Disp: 90 tablet, Rfl: 1    magnesium oxide (MAG-OX) 400 mg tablet, Take 1 tablet (400 mg total) by mouth 2 (two) times daily., Disp: , Rfl: 0    meloxicam (MOBIC) 7.5 MG tablet, TAKE 1 TABLET EVERY DAY, Disp: 90 tablet, Rfl: 1    metFORMIN (GLUCOPHAGE) 500 MG tablet, TAKE 1 TABLET TWICE DAILY WITH MEALS, Disp: 180 tablet, Rfl: 3    metoprolol succinate (TOPROL-XL) 25 MG 24 hr tablet, TAKE 1/2 TABLET EVERY DAY, Disp: 45 tablet, Rfl: 3    multivitamin (THERAGRAN) tablet, Take 1 tablet by mouth once daily., Disp: , Rfl:     nystatin (MYCOSTATIN) cream, Apply topically 2 (two) times daily., Disp: 30 g, Rfl: 4    nystatin (MYCOSTATIN) powder, APPLY  POWDER TOPICALLY 4 TIMES DAILY, Disp: 120 g, Rfl: 3    nystatin-triamcinolone  "(MYCOLOG II) cream, APPLY SPARINGLY TO AFFECTED AREA(S) 3 TIMES DAILY AS NEEDED, Disp: 30 g, Rfl: 1    polyethylene glycol (GLYCOLAX) 17 gram/dose powder, MIX 1 CAPFUL (17GM) IN LIQUID AND DRINK BEFORE BREAKFAST, Disp: 1530 g, Rfl: 0    potassium chloride SA (K-DUR,KLOR-CON) 20 MEQ tablet, Take 1 tablet (20 mEq total) by mouth once daily., Disp: 30 tablet, Rfl: 6    temazepam (RESTORIL) 30 mg capsule, TAKE 1 CAPSULE BY MOUTH AT NIGHT AS NEEDED., Disp: 30 capsule, Rfl: 1    trazodone (DESYREL) 150 MG tablet, Take 0.5 tablets (75 mg total) by mouth every evening., Disp: 90 tablet, Rfl: 3    TRUE METRIX GLUCOSE TEST STRIP Strp, TEST BLOOD SUGAR THREE TIMES DAILY, Disp: 300 strip, Rfl: 3    TRUEPLUS LANCETS 33 gauge Misc, USE THREE TIMES DAILY TO TEST BLOOD SUGAR, Disp: 300 each, Rfl: 3    REVIEW OF SYSTEMS:  GENERAL:  No progression of fatigue nor SOB  She is obese.  Difficulty   walking, extreme knee pain.  Gait instability only due to knees    HEENT:  No photophobia, rhinorrhea  RESPIRATORY:  ++ cough no wheezing.positive congestion  CARDIOVASCULAR:  No chest pain, palpitations  GASTROINTESTINAL:  No abdominal pain, dysphagia, emesis, diarrhea, or   constipation.    GENITOURINARY:  No urinary frequency, hesitancy  RHEUM:  ++  arthralgias or joint swelling.  MUSCOLSKELETAL:  No neck pain, back pain, positive  arthralgias  NEUROLOGICAL:  No headaches, positive dizziness, + paresthesias  PSYCH:  No agitation, change in behavior, or anxiety.  ENDOCRINE:  No hot or cold intolerance.    PHYSICAL EXAMINATION:  /71 (BP Location: Right arm, Patient Position: Sitting, BP Method: Medium (Automatic))   Pulse 77   Temp 97.8 °F (36.6 °C) (Oral)   Resp 20   Ht 5' 8" (1.727 m)   Wt 64.7 kg (142 lb 9.6 oz)   BMI 21.68 kg/m²     GENERAL:  She is obese.  She is oriented, well developed, well nourished.  PSYCH:  Pleasant affect.  No anxiety or depression.  HEENT:  Normocephalic.  Lids intact, conjunctivae pink.  " Sinuses nontender to   palpation.  OP clear, no palatal pallor.  NECK:  Supple.  Trachea midline.  No palpable abnormalities.  CHEST:  No use of accessory muscles during respiration. Decreased Breath sounds bases  CV RRR  ABDOMEN:  NT, ND.  Normal bowel sounds.  No palpable HSM or mass.  EXTREMITIES:  Legs, 1+ pitting edema.  Evidence of chronic venous insufficiency stable  NEUROLOGICAL:  Alert and oriented x 3.  Cranial nerves grossly intact.  SKIN:  Warm, dry.  Ecchymosis evident.  No tenting, petechiae    LABS:      Lab Results   Component Value Date    WBC 10.50 12/19/2017    HGB 14.9 12/19/2017    HCT 45.5 12/19/2017    MCV 90 12/19/2017     12/19/2017       IMPRESSION AND PLAN:        Bronchitis  -     methylPREDNISolone (MEDROL DOSEPACK) 4 mg tablet; Take 1 tablet (4 mg total) by mouth once daily. use as directed  Dispense: 1 Package; Refill: 0    History of anemia  -     CBC auto differential; Future; Expected date: 01/03/2018  -     Iron and TIBC; Future; Expected date: 01/03/2018    Polyneuropathy    Hepatomegaly    Edema, unspecified type    adding steroids to help with her breathing for now   Cont antibiotic  Decrease salt intake  Increase mobility  Abstain from fatty foods  RTC 2 months with iron levels and cbc   May need IV iron in the future  Educated on vitamins to help with polyneuropathy  Currently counts stableCont lipitor to prevent plaques  Cont lasix to help with edema  Watch for peripheral destruction of cells with hepatomegaly    Current Outpatient Prescriptions:     albuterol (VENTOLIN HFA) 90 mcg/actuation inhaler, INHALE TWO PUFFS EVERY 6 HOURS AS NEEDED FOR WHEEZING, Disp: 18 g, Rfl: 0    albuterol-ipratropium 2.5mg-0.5mg/3mL (DUO-NEB) 0.5 mg-3 mg(2.5 mg base)/3 mL nebulizer solution, INHALE THE CONTENTS OF 1 VIAL VIA NEBULIZER EVERY 6 HOURS AS NEEDED FOR  WHEEZING (DO NOT USE WITH ALBUTEROL INHALER), Disp: 90 mL, Rfl: 4    apixaban 5 mg Tab, Take 1 tablet (5 mg total) by mouth  2 (two) times daily., Disp: 90 tablet, Rfl: 3    ascorbic acid, vitamin C, (VITAMIN C) 500 MG tablet, Take 1 tablet (500 mg total) by mouth every evening., Disp: , Rfl:     atorvastatin (LIPITOR) 20 MG tablet, TAKE 1 TABLET (20 MG TOTAL) BY MOUTH ONCE DAILY., Disp: 90 tablet, Rfl: 3    budesonide (PULMICORT) 0.5 mg/2 mL nebulizer solution, Take 3 mLs (0.75 mg total) by nebulization once daily., Disp: 2 mL, Rfl: 3    cephALEXin (KEFLEX) 500 MG capsule, Take 500 mg by mouth every 12 (twelve) hours. , Disp: , Rfl:     clonazePAM (KLONOPIN) 1 MG tablet, 1/2 to1 tab at night, Disp: 30 tablet, Rfl: 3    doxycycline (MONODOX) 100 MG capsule, Take 1 capsule (100 mg total) by mouth 2 (two) times daily., Disp: 20 capsule, Rfl: 0    furosemide (LASIX) 40 MG tablet, TAKE ONE TABLET TWICE A DAYFOR FLUID OVERLOAD, Disp: 60 tablet, Rfl: 5    gabapentin (NEURONTIN) 800 MG tablet, Take 1 tablet (800 mg total) by mouth 3 (three) times daily., Disp: 270 tablet, Rfl: 3    hydrocodone-acetaminophen 10-325mg (NORCO)  mg Tab, Take 1 tablet by mouth every 8 (eight) hours as needed for Pain., Disp: 90 tablet, Rfl: 0    levalbuterol (XOPENEX) 0.63 mg/3 mL nebulizer solution, INHALE THE CONTENTS OF 1 VIAL BY NEBULIZATION 4 times  DAILY., Disp: 792 mL, Rfl: 3    lisinopril (PRINIVIL,ZESTRIL) 20 MG tablet, TAKE 1 TABLET EVERY DAY, Disp: 90 tablet, Rfl: 1    magnesium oxide (MAG-OX) 400 mg tablet, Take 1 tablet (400 mg total) by mouth 2 (two) times daily., Disp: , Rfl: 0    meloxicam (MOBIC) 7.5 MG tablet, TAKE 1 TABLET EVERY DAY, Disp: 90 tablet, Rfl: 1    metFORMIN (GLUCOPHAGE) 500 MG tablet, TAKE 1 TABLET TWICE DAILY WITH MEALS, Disp: 180 tablet, Rfl: 3    metoprolol succinate (TOPROL-XL) 25 MG 24 hr tablet, TAKE 1/2 TABLET EVERY DAY, Disp: 45 tablet, Rfl: 3    multivitamin (THERAGRAN) tablet, Take 1 tablet by mouth once daily., Disp: , Rfl:     nystatin (MYCOSTATIN) cream, Apply topically 2 (two) times daily., Disp: 30  g, Rfl: 4    nystatin (MYCOSTATIN) powder, APPLY  POWDER TOPICALLY 4 TIMES DAILY, Disp: 120 g, Rfl: 3    nystatin-triamcinolone (MYCOLOG II) cream, APPLY SPARINGLY TO AFFECTED AREA(S) 3 TIMES DAILY AS NEEDED, Disp: 30 g, Rfl: 1    polyethylene glycol (GLYCOLAX) 17 gram/dose powder, MIX 1 CAPFUL (17GM) IN LIQUID AND DRINK BEFORE BREAKFAST, Disp: 1530 g, Rfl: 0    potassium chloride SA (K-DUR,KLOR-CON) 20 MEQ tablet, Take 1 tablet (20 mEq total) by mouth once daily., Disp: 30 tablet, Rfl: 6    temazepam (RESTORIL) 30 mg capsule, TAKE 1 CAPSULE BY MOUTH AT NIGHT AS NEEDED., Disp: 30 capsule, Rfl: 1    trazodone (DESYREL) 150 MG tablet, Take 0.5 tablets (75 mg total) by mouth every evening., Disp: 90 tablet, Rfl: 3    TRUE METRIX GLUCOSE TEST STRIP Strp, TEST BLOOD SUGAR THREE TIMES DAILY, Disp: 300 strip, Rfl: 3    TRUEPLUS LANCETS 33 gauge Misc, USE THREE TIMES DAILY TO TEST BLOOD SUGAR, Disp: 300 each, Rfl: 3      She is to continue Coumadin for chronic atrial fibrillation as well as her diabetic medication to help control hyperglycemia due to diabetes

## 2018-01-16 RX ORDER — CEPHALEXIN 500 MG/1
CAPSULE ORAL
Qty: 90 CAPSULE | Refills: 1 | Status: SHIPPED | OUTPATIENT
Start: 2018-01-16 | End: 2018-04-03 | Stop reason: SDUPTHER

## 2018-01-17 ENCOUNTER — TELEPHONE (OUTPATIENT)
Dept: FAMILY MEDICINE | Facility: CLINIC | Age: 68
End: 2018-01-17

## 2018-01-17 DIAGNOSIS — J11.1 INFLUENZA: Primary | ICD-10-CM

## 2018-01-17 RX ORDER — OSELTAMIVIR PHOSPHATE 75 MG/1
75 CAPSULE ORAL 2 TIMES DAILY
Qty: 10 CAPSULE | Refills: 0 | Status: SHIPPED | OUTPATIENT
Start: 2018-01-17 | End: 2018-01-22

## 2018-01-17 NOTE — TELEPHONE ENCOUNTER
----- Message from Chelsea Olivo sent at 1/17/2018  3:27 PM CST -----  Patient states that she has a real bad cold and needs office to send her in something. She is coughing, headaches, body aches, 99 fever, congestion, fatigue. Please remit to:      [facility] [id number] [place] [address]  Phone: [phone] Fax: [phone]    Please call with questions or when completed at [phone].

## 2018-01-18 NOTE — TELEPHONE ENCOUNTER
Patient informed and verbalizes full understanding.  Patient states she will keep her appointment with Dr Bird on Friday.

## 2018-01-19 ENCOUNTER — TELEPHONE (OUTPATIENT)
Dept: FAMILY MEDICINE | Facility: CLINIC | Age: 68
End: 2018-01-19

## 2018-01-19 NOTE — TELEPHONE ENCOUNTER
----- Message from Emilia Montano sent at 1/19/2018  8:27 AM CST -----  Contact: Patient  Nelly, patient [phone] calling to speak with the nurse letting her know why she will not be coming in for her appointment, her mother is being rushed to the hospital for a heart attack and her father is in a nursing home. Patient is rescheduled for next available with Dr. Bird which was 5/21/18. Please advise. Thanks.

## 2018-01-23 DIAGNOSIS — F33.9 MAJOR DEPRESSION, RECURRENT, CHRONIC: Primary | ICD-10-CM

## 2018-01-24 ENCOUNTER — OFFICE VISIT (OUTPATIENT)
Dept: FAMILY MEDICINE | Facility: CLINIC | Age: 68
End: 2018-01-24
Payer: MEDICARE

## 2018-01-24 ENCOUNTER — HOSPITAL ENCOUNTER (OUTPATIENT)
Dept: RADIOLOGY | Facility: CLINIC | Age: 68
Discharge: HOME | End: 2018-01-24
Attending: NURSE PRACTITIONER
Payer: MEDICARE

## 2018-01-24 ENCOUNTER — OUTPATIENT CASE MANAGEMENT (OUTPATIENT)
Dept: ADMINISTRATIVE | Facility: OTHER | Age: 68
End: 2018-01-24

## 2018-01-24 VITALS
HEIGHT: 68 IN | TEMPERATURE: 98 F | DIASTOLIC BLOOD PRESSURE: 80 MMHG | WEIGHT: 293 LBS | HEART RATE: 76 BPM | OXYGEN SATURATION: 92 % | BODY MASS INDEX: 44.41 KG/M2 | SYSTOLIC BLOOD PRESSURE: 128 MMHG

## 2018-01-24 DIAGNOSIS — E66.01 MORBID OBESITY WITH BMI OF 40.0-44.9, ADULT: ICD-10-CM

## 2018-01-24 DIAGNOSIS — R05.9 COUGH: ICD-10-CM

## 2018-01-24 DIAGNOSIS — I50.42 CHRONIC COMBINED SYSTOLIC AND DIASTOLIC CONGESTIVE HEART FAILURE: ICD-10-CM

## 2018-01-24 DIAGNOSIS — R06.09 DYSPNEA ON EXERTION: ICD-10-CM

## 2018-01-24 DIAGNOSIS — R05.9 COUGH: Primary | ICD-10-CM

## 2018-01-24 DIAGNOSIS — I15.2 HYPERTENSION ASSOCIATED WITH DIABETES: ICD-10-CM

## 2018-01-24 DIAGNOSIS — E11.8 TYPE 2 DIABETES MELLITUS WITH COMPLICATION, WITHOUT LONG-TERM CURRENT USE OF INSULIN: ICD-10-CM

## 2018-01-24 DIAGNOSIS — J98.4 MIXED RESTRICTIVE AND OBSTRUCTIVE LUNG DISEASE: ICD-10-CM

## 2018-01-24 DIAGNOSIS — E11.59 HYPERTENSION ASSOCIATED WITH DIABETES: ICD-10-CM

## 2018-01-24 DIAGNOSIS — J44.9 CHRONIC OBSTRUCTIVE PULMONARY DISEASE, UNSPECIFIED COPD TYPE: ICD-10-CM

## 2018-01-24 DIAGNOSIS — J43.9 MIXED RESTRICTIVE AND OBSTRUCTIVE LUNG DISEASE: ICD-10-CM

## 2018-01-24 PROCEDURE — 99214 OFFICE O/P EST MOD 30 MIN: CPT | Mod: S$GLB,,, | Performed by: NURSE PRACTITIONER

## 2018-01-24 PROCEDURE — 99499 UNLISTED E&M SERVICE: CPT | Mod: S$GLB,,, | Performed by: NURSE PRACTITIONER

## 2018-01-24 PROCEDURE — 71046 X-RAY EXAM CHEST 2 VIEWS: CPT | Mod: TC,FY,PO

## 2018-01-24 PROCEDURE — 71046 X-RAY EXAM CHEST 2 VIEWS: CPT | Mod: 26,,, | Performed by: RADIOLOGY

## 2018-01-24 PROCEDURE — 99999 PR PBB SHADOW E&M-EST. PATIENT-LVL III: CPT | Mod: PBBFAC,,, | Performed by: NURSE PRACTITIONER

## 2018-01-24 RX ORDER — LEVOFLOXACIN 500 MG/1
500 TABLET, FILM COATED ORAL DAILY
Qty: 7 TABLET | Refills: 0 | Status: SHIPPED | OUTPATIENT
Start: 2018-01-24 | End: 2018-01-31

## 2018-01-24 NOTE — PROGRESS NOTES
Thank you for the referral. The following patient has been assigned to Latoya Sykes RN with [facility] for high risk screening.    Reason for referral: Major depression, recurrent, chronic    Please contact [facility] at [phone] with any questions.    Thank you,    Marlyn Andrade

## 2018-01-24 NOTE — PROGRESS NOTES
Subjective:       Patient ID: Nelly Tian is a 67 y.o. female.    Chief Complaint: Cough (fever, body aches)    Ms. Tian presents to the clinic today for follow up for cough with sputum.  She has history of COPD and CHF.  She has had productive cough x 4 weeks. She has taken doxycycline, prednisone, pulmicort, Duoneb with a little improvement.  She notes leg swelling and she is taking lasix as prescribed.  She does not weigh herself at home.        Cough   This is a recurrent problem. The current episode started 1 to 4 weeks ago (4 weeks). The problem has been gradually improving. The problem occurs every few minutes. The cough is productive of purulent sputum. Associated symptoms include shortness of breath. Pertinent negatives include no chest pain, chills, ear congestion, fever, postnasal drip, rhinorrhea or wheezing. The symptoms are aggravated by lying down. She has tried a beta-agonist inhaler, steroid inhaler and oral steroids (doxycycline) for the symptoms. The treatment provided mild relief. Her past medical history is significant for COPD.     Review of Systems   Constitutional: Negative for chills and fever.   HENT: Positive for congestion. Negative for postnasal drip and rhinorrhea.    Eyes: Negative for visual disturbance.   Respiratory: Positive for cough and shortness of breath. Negative for wheezing.         + orthopnea, chronic   Cardiovascular: Negative for chest pain.       Objective:      Physical Exam   Constitutional: She is oriented to person, place, and time. She appears well-nourished. No distress.   HENT:   Head: Normocephalic and atraumatic.   Right Ear: External ear normal.   Left Ear: External ear normal.   Mouth/Throat: Oropharynx is clear and moist. No oropharyngeal exudate.   Eyes: Pupils are equal, round, and reactive to light. Right eye exhibits no discharge. Left eye exhibits no discharge.   Neck: Neck supple. No thyromegaly present.   Cardiovascular: Normal rate and regular  rhythm.  Exam reveals distant heart sounds. Exam reveals no gallop and no friction rub.    No murmur heard.  Pulmonary/Chest: Effort normal. No respiratory distress. She has decreased breath sounds (throughout). She has no wheezes. She has no rhonchi. She has no rales.   No egophony   Abdominal: Soft. She exhibits no distension. There is no tenderness.   Lymphadenopathy:     She has no cervical adenopathy.   Neurological: She is alert and oriented to person, place, and time. Coordination normal.   Skin: Skin is warm and dry.   Psychiatric: She has a normal mood and affect. Her behavior is normal. Thought content normal.   Vitals reviewed.          Current Outpatient Prescriptions:     albuterol (VENTOLIN HFA) 90 mcg/actuation inhaler, INHALE TWO PUFFS EVERY 6 HOURS AS NEEDED FOR WHEEZING, Disp: 18 g, Rfl: 0    albuterol-ipratropium 2.5mg-0.5mg/3mL (DUO-NEB) 0.5 mg-3 mg(2.5 mg base)/3 mL nebulizer solution, INHALE THE CONTENTS OF 1 VIAL VIA NEBULIZER EVERY 6 HOURS AS NEEDED FOR  WHEEZING (DO NOT USE WITH ALBUTEROL INHALER), Disp: 90 mL, Rfl: 4    apixaban 5 mg Tab, Take 1 tablet (5 mg total) by mouth 2 (two) times daily., Disp: 90 tablet, Rfl: 3    ascorbic acid, vitamin C, (VITAMIN C) 500 MG tablet, Take 1 tablet (500 mg total) by mouth every evening., Disp: , Rfl:     atorvastatin (LIPITOR) 20 MG tablet, TAKE 1 TABLET (20 MG TOTAL) BY MOUTH ONCE DAILY., Disp: 90 tablet, Rfl: 3    budesonide (PULMICORT) 0.5 mg/2 mL nebulizer solution, Take 3 mLs (0.75 mg total) by nebulization once daily., Disp: 2 mL, Rfl: 3    cephALEXin (KEFLEX) 500 MG capsule, TAKE 1 CAPSULE ONE TIME DAILY AT 6:00A.M., Disp: 90 capsule, Rfl: 1    clonazePAM (KLONOPIN) 1 MG tablet, 1/2 to1 tab at night, Disp: 30 tablet, Rfl: 3    doxycycline (MONODOX) 100 MG capsule, Take 1 capsule (100 mg total) by mouth 2 (two) times daily., Disp: 20 capsule, Rfl: 0    furosemide (LASIX) 40 MG tablet, TAKE ONE TABLET TWICE A DAYFOR FLUID OVERLOAD,  Disp: 60 tablet, Rfl: 5    gabapentin (NEURONTIN) 800 MG tablet, Take 1 tablet (800 mg total) by mouth 3 (three) times daily., Disp: 270 tablet, Rfl: 3    hydrocodone-acetaminophen 10-325mg (NORCO)  mg Tab, Take 1 tablet by mouth every 8 (eight) hours as needed for Pain., Disp: 90 tablet, Rfl: 0    levalbuterol (XOPENEX) 0.63 mg/3 mL nebulizer solution, INHALE THE CONTENTS OF 1 VIAL BY NEBULIZATION 4 times  DAILY., Disp: 792 mL, Rfl: 3    lisinopril (PRINIVIL,ZESTRIL) 20 MG tablet, TAKE 1 TABLET EVERY DAY, Disp: 90 tablet, Rfl: 1    magnesium oxide (MAG-OX) 400 mg tablet, Take 1 tablet (400 mg total) by mouth 2 (two) times daily., Disp: , Rfl: 0    meloxicam (MOBIC) 7.5 MG tablet, TAKE 1 TABLET EVERY DAY, Disp: 90 tablet, Rfl: 1    metFORMIN (GLUCOPHAGE) 500 MG tablet, TAKE 1 TABLET TWICE DAILY WITH MEALS, Disp: 180 tablet, Rfl: 3    metoprolol succinate (TOPROL-XL) 25 MG 24 hr tablet, TAKE 1/2 TABLET EVERY DAY, Disp: 45 tablet, Rfl: 3    multivitamin (THERAGRAN) tablet, Take 1 tablet by mouth once daily., Disp: , Rfl:     nystatin (MYCOSTATIN) cream, Apply topically 2 (two) times daily., Disp: 30 g, Rfl: 4    nystatin (MYCOSTATIN) powder, APPLY  POWDER TOPICALLY 4 TIMES DAILY, Disp: 120 g, Rfl: 3    nystatin-triamcinolone (MYCOLOG II) cream, APPLY SPARINGLY TO AFFECTED AREA(S) 3 TIMES DAILY AS NEEDED, Disp: 30 g, Rfl: 1    polyethylene glycol (GLYCOLAX) 17 gram/dose powder, MIX 1 CAPFUL (17GM) IN LIQUID AND DRINK BEFORE BREAKFAST, Disp: 1530 g, Rfl: 0    potassium chloride SA (K-DUR,KLOR-CON) 20 MEQ tablet, Take 1 tablet (20 mEq total) by mouth once daily., Disp: 30 tablet, Rfl: 6    temazepam (RESTORIL) 30 mg capsule, TAKE 1 CAPSULE BY MOUTH AT NIGHT AS NEEDED., Disp: 30 capsule, Rfl: 1    trazodone (DESYREL) 150 MG tablet, Take 0.5 tablets (75 mg total) by mouth every evening., Disp: 90 tablet, Rfl: 3    TRUE METRIX GLUCOSE TEST STRIP Strp, TEST BLOOD SUGAR THREE TIMES DAILY, Disp: 300  strip, Rfl: 3    TRUEPLUS LANCETS 33 gauge Misc, USE THREE TIMES DAILY TO TEST BLOOD SUGAR, Disp: 300 each, Rfl: 3  Assessment:       1. Cough    2. Dyspnea on exertion    3. Chronic obstructive pulmonary disease, unspecified COPD type    4. Mixed restrictive and obstructive lung disease    5. Hypertension associated with diabetes    6. Chronic combined systolic and diastolic congestive heart failure    7. Morbid obesity with BMI of 40.0-44.9, adult    8. Type 2 diabetes mellitus with complication, without long-term current use of insulin        Plan:       Cough  -     X-Ray Chest PA And Lateral; Future; Expected date: 01/24/2018  -     Brain natriuretic peptide; Future; Expected date: 01/24/2018  -     CBC auto differential; Future; Expected date: 01/24/2018    Dyspnea on exertion  -     X-Ray Chest PA And Lateral; Future; Expected date: 01/24/2018  -     Brain natriuretic peptide; Future; Expected date: 01/24/2018  -     CBC auto differential; Future; Expected date: 01/24/2018    Chronic obstructive pulmonary disease, unspecified COPD type    Mixed restrictive and obstructive lung disease    Hypertension associated with diabetes    Chronic combined systolic and diastolic congestive heart failure    Morbid obesity with BMI of 40.0-44.9, adult    Type 2 diabetes mellitus with complication, without long-term current use of insulin

## 2018-01-25 ENCOUNTER — TELEPHONE (OUTPATIENT)
Dept: FAMILY MEDICINE | Facility: CLINIC | Age: 68
End: 2018-01-25

## 2018-01-25 DIAGNOSIS — I50.9 CONGESTIVE HEART FAILURE, UNSPECIFIED CONGESTIVE HEART FAILURE CHRONICITY, UNSPECIFIED CONGESTIVE HEART FAILURE TYPE: Primary | ICD-10-CM

## 2018-01-25 RX ORDER — SPIRONOLACTONE 25 MG/1
25 TABLET ORAL DAILY
Qty: 30 TABLET | Refills: 11 | Status: SHIPPED | OUTPATIENT
Start: 2018-01-25 | End: 2018-12-28 | Stop reason: SDUPTHER

## 2018-01-25 NOTE — TELEPHONE ENCOUNTER
Notified patient of lab results.  Spoke with Dr. Bird--will add spironolactone.  Notified patient to drink less than 1 L fluid per day.  Low salt diet.  RTC 1/29.

## 2018-01-26 ENCOUNTER — TELEPHONE (OUTPATIENT)
Dept: CARDIOLOGY | Facility: CLINIC | Age: 68
End: 2018-01-26

## 2018-01-26 NOTE — TELEPHONE ENCOUNTER
Called and spoke with Ms. Tian, Gave her the number to call Wen at the hospital to reschedule her echo. She verbalized understanding. No further issues noted.

## 2018-01-26 NOTE — TELEPHONE ENCOUNTER
----- Message from Katy Cohen RT sent at 1/26/2018  3:34 PM CST -----  Contact: pt    pt , requesting to inform she would like to reschedule the Echo Cardiac test, thanks.

## 2018-01-29 ENCOUNTER — HOSPITAL ENCOUNTER (OUTPATIENT)
Dept: RADIOLOGY | Facility: CLINIC | Age: 68
Discharge: HOME | End: 2018-01-29
Attending: NURSE PRACTITIONER
Payer: MEDICARE

## 2018-01-29 ENCOUNTER — OFFICE VISIT (OUTPATIENT)
Dept: FAMILY MEDICINE | Facility: CLINIC | Age: 68
End: 2018-01-29
Payer: MEDICARE

## 2018-01-29 VITALS
SYSTOLIC BLOOD PRESSURE: 139 MMHG | DIASTOLIC BLOOD PRESSURE: 79 MMHG | TEMPERATURE: 97 F | BODY MASS INDEX: 44.41 KG/M2 | OXYGEN SATURATION: 95 % | HEIGHT: 68 IN | WEIGHT: 293 LBS | HEART RATE: 82 BPM

## 2018-01-29 DIAGNOSIS — J90 PLEURAL EFFUSION: Primary | ICD-10-CM

## 2018-01-29 DIAGNOSIS — F33.9 MAJOR DEPRESSION, RECURRENT, CHRONIC: ICD-10-CM

## 2018-01-29 DIAGNOSIS — M51.36 DDD (DEGENERATIVE DISC DISEASE), LUMBAR: Chronic | ICD-10-CM

## 2018-01-29 DIAGNOSIS — M51.36 LUMBAR DEGENERATIVE DISC DISEASE: ICD-10-CM

## 2018-01-29 DIAGNOSIS — F41.9 ANXIETY: ICD-10-CM

## 2018-01-29 DIAGNOSIS — M43.12 SPONDYLOLISTHESIS OF CERVICAL REGION: ICD-10-CM

## 2018-01-29 DIAGNOSIS — I15.2 HYPERTENSION ASSOCIATED WITH DIABETES: ICD-10-CM

## 2018-01-29 DIAGNOSIS — I48.20 CHRONIC ATRIAL FIBRILLATION: ICD-10-CM

## 2018-01-29 DIAGNOSIS — E11.8 TYPE 2 DIABETES MELLITUS WITH COMPLICATION, WITHOUT LONG-TERM CURRENT USE OF INSULIN: ICD-10-CM

## 2018-01-29 DIAGNOSIS — M16.11 PRIMARY OSTEOARTHRITIS OF RIGHT HIP: ICD-10-CM

## 2018-01-29 DIAGNOSIS — M51.36 DDD (DEGENERATIVE DISC DISEASE), LUMBAR: Primary | Chronic | ICD-10-CM

## 2018-01-29 DIAGNOSIS — E66.01 MORBID OBESITY WITH BMI OF 40.0-44.9, ADULT: ICD-10-CM

## 2018-01-29 DIAGNOSIS — Z79.1 NSAID LONG-TERM USE: ICD-10-CM

## 2018-01-29 DIAGNOSIS — J90 PLEURAL EFFUSION: ICD-10-CM

## 2018-01-29 DIAGNOSIS — E11.59 HYPERTENSION ASSOCIATED WITH DIABETES: ICD-10-CM

## 2018-01-29 DIAGNOSIS — F19.20 DEPENDENCY ON PAIN MEDICATION: ICD-10-CM

## 2018-01-29 DIAGNOSIS — F17.200 TOBACCO DEPENDENCY: ICD-10-CM

## 2018-01-29 PROCEDURE — 99999 PR PBB SHADOW E&M-EST. PATIENT-LVL V: CPT | Mod: PBBFAC,,, | Performed by: NURSE PRACTITIONER

## 2018-01-29 PROCEDURE — 71046 X-RAY EXAM CHEST 2 VIEWS: CPT | Mod: TC,FY,PO

## 2018-01-29 PROCEDURE — 99499 UNLISTED E&M SERVICE: CPT | Mod: S$GLB,,, | Performed by: NURSE PRACTITIONER

## 2018-01-29 PROCEDURE — 99214 OFFICE O/P EST MOD 30 MIN: CPT | Mod: S$GLB,,, | Performed by: NURSE PRACTITIONER

## 2018-01-29 PROCEDURE — 71046 X-RAY EXAM CHEST 2 VIEWS: CPT | Mod: 26,,, | Performed by: RADIOLOGY

## 2018-01-29 NOTE — TELEPHONE ENCOUNTER
Patient requests refill.  She signed contract 10/2017  Urine tox 10/2017  She requests 120 per month because she runs out early.

## 2018-01-29 NOTE — PROGRESS NOTES
Subjective:       Patient ID: Nelly Tian is a 67 y.o. female.    Chief Complaint: Cough    Ms. Tian presents to the clinic today for follow up for pleural effusion, cough, possible pneumonia.  She has been taking levofloxacin and spironolactone with much improvement.  She states the cough is better.  She is upset she is unable to move around as much as she used to do.  She uses a walker.  Both her parents are elderly and she is upset she cannot help care for them in her condition.  She has a daughter but her daughter is busy caring for her own disabled son.  She requests refill of Norco which she takes for chronic back pain.  She knows she would feel better if she lost weight.      Review of Systems   Constitutional: Negative for chills and fever.   HENT: Negative for congestion, ear pain and sinus pressure.    Eyes: Negative for visual disturbance.   Respiratory: Negative for cough, shortness of breath and wheezing.    Cardiovascular: Negative for chest pain, palpitations and leg swelling.   Gastrointestinal: Negative for abdominal pain, constipation and diarrhea.   Musculoskeletal: Positive for back pain (chronic, stable).   Psychiatric/Behavioral: Positive for dysphoric mood. Negative for suicidal ideas. The patient is not nervous/anxious.        Objective:      Physical Exam   Constitutional: She is oriented to person, place, and time. She appears well-nourished. No distress.   HENT:   Head: Normocephalic and atraumatic.   Right Ear: External ear normal.   Left Ear: External ear normal.   Mouth/Throat: Oropharynx is clear and moist. No oropharyngeal exudate.   Eyes: Pupils are equal, round, and reactive to light. Right eye exhibits no discharge. Left eye exhibits no discharge.   Neck: Neck supple. No thyromegaly present.   Cardiovascular: Normal rate and regular rhythm.  Exam reveals distant heart sounds. Exam reveals no gallop and no friction rub.    No murmur heard.  Pulmonary/Chest: Effort normal. No  accessory muscle usage. No respiratory distress. She has decreased breath sounds. She has no wheezes. She has no rhonchi. She has no rales.   Abdominal: Soft. She exhibits no distension. There is no tenderness.   Lymphadenopathy:     She has no cervical adenopathy.   Neurological: She is alert and oriented to person, place, and time. Coordination normal.   Skin: Skin is warm and dry.   Psychiatric: She has a normal mood and affect. Her behavior is normal. Thought content normal.   Vitals reviewed.          Current Outpatient Prescriptions:     albuterol (VENTOLIN HFA) 90 mcg/actuation inhaler, INHALE TWO PUFFS EVERY 6 HOURS AS NEEDED FOR WHEEZING, Disp: 18 g, Rfl: 0    albuterol-ipratropium 2.5mg-0.5mg/3mL (DUO-NEB) 0.5 mg-3 mg(2.5 mg base)/3 mL nebulizer solution, INHALE THE CONTENTS OF 1 VIAL VIA NEBULIZER EVERY 6 HOURS AS NEEDED FOR  WHEEZING (DO NOT USE WITH ALBUTEROL INHALER), Disp: 90 mL, Rfl: 4    apixaban 5 mg Tab, Take 1 tablet (5 mg total) by mouth 2 (two) times daily., Disp: 90 tablet, Rfl: 3    ascorbic acid, vitamin C, (VITAMIN C) 500 MG tablet, Take 1 tablet (500 mg total) by mouth every evening., Disp: , Rfl:     atorvastatin (LIPITOR) 20 MG tablet, TAKE 1 TABLET (20 MG TOTAL) BY MOUTH ONCE DAILY., Disp: 90 tablet, Rfl: 3    budesonide (PULMICORT) 0.5 mg/2 mL nebulizer solution, Take 3 mLs (0.75 mg total) by nebulization once daily., Disp: 2 mL, Rfl: 3    cephALEXin (KEFLEX) 500 MG capsule, TAKE 1 CAPSULE ONE TIME DAILY AT 6:00A.M., Disp: 90 capsule, Rfl: 1    clonazePAM (KLONOPIN) 1 MG tablet, 1/2 to1 tab at night, Disp: 30 tablet, Rfl: 3    doxycycline (MONODOX) 100 MG capsule, Take 1 capsule (100 mg total) by mouth 2 (two) times daily., Disp: 20 capsule, Rfl: 0    furosemide (LASIX) 40 MG tablet, TAKE ONE TABLET TWICE A DAYFOR FLUID OVERLOAD, Disp: 60 tablet, Rfl: 5    gabapentin (NEURONTIN) 800 MG tablet, Take 1 tablet (800 mg total) by mouth 3 (three) times daily., Disp: 270 tablet,  Rfl: 3    hydrocodone-acetaminophen 10-325mg (NORCO)  mg Tab, Take 1 tablet by mouth every 8 (eight) hours as needed for Pain., Disp: 90 tablet, Rfl: 0    levalbuterol (XOPENEX) 0.63 mg/3 mL nebulizer solution, INHALE THE CONTENTS OF 1 VIAL BY NEBULIZATION 4 times  DAILY., Disp: 792 mL, Rfl: 3    levoFLOXacin (LEVAQUIN) 500 MG tablet, Take 1 tablet (500 mg total) by mouth once daily., Disp: 7 tablet, Rfl: 0    lisinopril (PRINIVIL,ZESTRIL) 20 MG tablet, TAKE 1 TABLET EVERY DAY, Disp: 90 tablet, Rfl: 1    magnesium oxide (MAG-OX) 400 mg tablet, Take 1 tablet (400 mg total) by mouth 2 (two) times daily., Disp: , Rfl: 0    meloxicam (MOBIC) 7.5 MG tablet, TAKE 1 TABLET EVERY DAY, Disp: 90 tablet, Rfl: 1    metFORMIN (GLUCOPHAGE) 500 MG tablet, TAKE 1 TABLET TWICE DAILY WITH MEALS, Disp: 180 tablet, Rfl: 3    metoprolol succinate (TOPROL-XL) 25 MG 24 hr tablet, TAKE 1/2 TABLET EVERY DAY, Disp: 45 tablet, Rfl: 3    multivitamin (THERAGRAN) tablet, Take 1 tablet by mouth once daily., Disp: , Rfl:     nystatin (MYCOSTATIN) cream, Apply topically 2 (two) times daily., Disp: 30 g, Rfl: 4    nystatin (MYCOSTATIN) powder, APPLY  POWDER TOPICALLY 4 TIMES DAILY, Disp: 120 g, Rfl: 3    nystatin-triamcinolone (MYCOLOG II) cream, APPLY SPARINGLY TO AFFECTED AREA(S) 3 TIMES DAILY AS NEEDED, Disp: 30 g, Rfl: 1    polyethylene glycol (GLYCOLAX) 17 gram/dose powder, MIX 1 CAPFUL (17GM) IN LIQUID AND DRINK BEFORE BREAKFAST, Disp: 1530 g, Rfl: 0    potassium chloride SA (K-DUR,KLOR-CON) 20 MEQ tablet, Take 1 tablet (20 mEq total) by mouth once daily., Disp: 30 tablet, Rfl: 6    spironolactone (ALDACTONE) 25 MG tablet, Take 1 tablet (25 mg total) by mouth once daily., Disp: 30 tablet, Rfl: 11    temazepam (RESTORIL) 30 mg capsule, TAKE 1 CAPSULE BY MOUTH AT NIGHT AS NEEDED., Disp: 30 capsule, Rfl: 1    trazodone (DESYREL) 150 MG tablet, Take 0.5 tablets (75 mg total) by mouth every evening., Disp: 90 tablet, Rfl:  3    TRUE METRIX GLUCOSE TEST STRIP Strp, TEST BLOOD SUGAR THREE TIMES DAILY, Disp: 300 strip, Rfl: 3    TRUEPLUS LANCETS 33 gauge Misc, USE THREE TIMES DAILY TO TEST BLOOD SUGAR, Disp: 300 each, Rfl: 3  Assessment:       1. Pleural effusion    2. Type 2 diabetes mellitus with complication, without long-term current use of insulin    3. Hypertension associated with diabetes    4. Major depression, recurrent, chronic    5. Chronic atrial fibrillation    6. DDD (degenerative disc disease), lumbar    7. Morbid obesity with BMI of 40.0-44.9, adult        Plan:       Pleural effusion  Resolved.    -     X-Ray Chest PA And Lateral; Future; Expected date: 01/29/2018    Type 2 diabetes mellitus with complication, without long-term current use of insulin  Stable, continue current medication.    Hypertension associated with diabetes  Stable, continue current medication.    Major depression, recurrent, chronic  Declines medication or counseling.    Chronic atrial fibrillation  Stable, follow up Cardiology.    DDD (degenerative disc disease), lumbar  [place] refill sent to PCP.  Pain contract 11/2017  UDS 10/2017    Morbid obesity  See below    Patient readiness: acceptance and barriers:readiness, social stressors and household issues    During the course of the visit the patient was educated and counseled about the following:     Diabetes:  Discussed general issues about diabetes pathophysiology and management.  Addressed ADA diet.  Hypertension:   Medication: no change.  Obesity:   General weight loss/lifestyle modification strategies discussed (elicit support from others; identify saboteurs; non-food rewards, etc).    Goals: Diabetes: Maintain Hemoglobin A1C below 7, Hypertension: Reduce Blood Pressure and Obesity: Reduce calorie intake and BMI    Did patient meet goals/outcomes: Yes    The following self management tools provided: declined    Patient Instructions (the written plan) was given to the patient/family.     Time  spent with patient: 30 minutes    Barriers to medications present (no )    Adverse reactions to current medications (no)    Over the counter medications reviewed (No)

## 2018-01-30 RX ORDER — HYDROCODONE BITARTRATE AND ACETAMINOPHEN 10; 325 MG/1; MG/1
1 TABLET ORAL EVERY 8 HOURS PRN
Qty: 90 TABLET | Refills: 0 | Status: SHIPPED | OUTPATIENT
Start: 2018-01-30 | End: 2018-03-05 | Stop reason: SDUPTHER

## 2018-01-31 RX ORDER — METHOCARBAMOL 750 MG/1
750 TABLET, FILM COATED ORAL 4 TIMES DAILY PRN
Qty: 360 TABLET | Refills: 0 | Status: SHIPPED | OUTPATIENT
Start: 2018-01-31 | End: 2018-02-09 | Stop reason: CLARIF

## 2018-01-31 RX ORDER — POTASSIUM CHLORIDE 1500 MG/1
TABLET, EXTENDED RELEASE ORAL
Qty: 30 TABLET | Refills: 6 | Status: SHIPPED | OUTPATIENT
Start: 2018-01-31 | End: 2018-09-01 | Stop reason: SDUPTHER

## 2018-01-31 NOTE — TELEPHONE ENCOUNTER
The Norco rs is only #90 due to multiple narcotic prescription.   She has tolerance and multiple health needs, She has to stop smoking and should see Dr Weber for pain managent to consider epidural , or ablation. Others pain doctor's Dr Joseph. I will like her to get solutions for her back. I do not need drug dependence and poor quality of life. Narcotic perpetuates pain and creates depression.

## 2018-02-01 ENCOUNTER — TELEPHONE (OUTPATIENT)
Dept: FAMILY MEDICINE | Facility: CLINIC | Age: 68
End: 2018-02-01

## 2018-02-01 ENCOUNTER — HOSPITAL ENCOUNTER (OUTPATIENT)
Dept: CARDIOLOGY | Facility: HOSPITAL | Age: 68
Discharge: HOME | End: 2018-02-01
Attending: INTERNAL MEDICINE
Payer: MEDICARE

## 2018-02-01 DIAGNOSIS — I48.20 CHRONIC A-FIB: ICD-10-CM

## 2018-02-01 PROCEDURE — 63600175 PHARM REV CODE 636 W HCPCS: Performed by: INTERNAL MEDICINE

## 2018-02-01 PROCEDURE — C8929 TTE W OR WO FOL WCON,DOPPLER: HCPCS

## 2018-02-01 PROCEDURE — 93306 TTE W/DOPPLER COMPLETE: CPT | Mod: 26,,, | Performed by: INTERNAL MEDICINE

## 2018-02-01 NOTE — TELEPHONE ENCOUNTER
----- Message from Shira Joshi sent at 1/31/2018  1:31 PM CST -----  Patient is returning nurse's call/please call patient back at [phone].

## 2018-02-02 ENCOUNTER — TELEPHONE (OUTPATIENT)
Dept: FAMILY MEDICINE | Facility: CLINIC | Age: 68
End: 2018-02-02

## 2018-02-02 ENCOUNTER — OUTPATIENT CASE MANAGEMENT (OUTPATIENT)
Dept: ADMINISTRATIVE | Facility: OTHER | Age: 68
End: 2018-02-02

## 2018-02-02 DIAGNOSIS — J42 CHRONIC BRONCHITIS, UNSPECIFIED CHRONIC BRONCHITIS TYPE: ICD-10-CM

## 2018-02-02 DIAGNOSIS — J96.12 HYPERCAPNIC RESPIRATORY FAILURE, CHRONIC: Primary | ICD-10-CM

## 2018-02-02 LAB
AORTIC VALVE REGURGITATION: NORMAL
DIASTOLIC DYSFUNCTION: NO
ESTIMATED PA SYSTOLIC PRESSURE: 22.28
MITRAL VALVE REGURGITATION: NORMAL
RETIRED EF AND QEF - SEE NOTES: 50 (ref 55–65)
TRICUSPID VALVE REGURGITATION: NORMAL

## 2018-02-02 NOTE — TELEPHONE ENCOUNTER
----- Message from Royce Foss sent at 2/2/2018  3:47 PM CST -----  Contact: pt  Pt is requesting a refill on her BIG and portable oxygen tanks  Call Back#[phone]  Thanks

## 2018-02-02 NOTE — PROGRESS NOTES
2/2/2018  Referral received for enrollment in [facility] high risk program.  Pt. Is agreeable to program, initial screen and assessment completed.  Patient does qualify for enrollment.  Review of EMR, and problem list.  Pt. Is a 68yo female with a PMH that includes: DDD - degenerative disc disease lumbar, depression, anxiety, pain medication dependency, restrictive and abstructive lung disease, chronic hypercapnic respiratory failure, COPD, oxygen dependent, chronic A-fib, HTN, PVD, hyperlipidemia, CHF w/EF of 50-55%, iron deficiency anemia, DM with an A1C 6.2, mobid obesity with BMI of 40.0-44.9, arthritis, GERD, tobacco dependency.  Pt. Lives at home with her adult daughter Maye, # [phone].  Pt. Reports that she is fairly independent of ADL's and IADL's, she does still drive on occasion.  Pt. ambulates with WR, reports one fall without injury this past year.  Pt. States that she tripped on a blanket that was left on the floor.  Encouraged patient to keep floor free from clutter at home.  Pt. Is oxygen dependent and normally uses 2.0 L via n/c.  Supplied by [employer].   Pt. Reports other DME in the home includes nebulizer, glucometer, BP cuff, and scale.  Pt. Reports that her nebulizer is currently not working and she is requesting a new one will follow up with [facility] # [phone], and facilitate to new nebulizer if able.  Per [employer] rep they do not supply her oxygen.  Patient states that she currently has a nebulizer from a relative that she is using for her treatments daily.  Pt. Reports monitoring her BP 2x daily, glucose 2x daily and a daily weight.  Pt. Reports following a DM diet, low salt and low cholesterol diet.  Pt. Reports not getting much exercise. Pt. Reports taking her medications as she should, reports no concerns with co payments.  Is requesting a pill box, will send one.  Will mail contact information with educational material to patient's address.  Pt. Is agreeable to follow up call next week.       Follow up Plan:    Sparkle lawson  Facilitate to needed DME  Collaborate with care team as needed    JUANITA Zhu

## 2018-02-02 NOTE — TELEPHONE ENCOUNTER
Please see attached message from patient. Patient states she needs a written prescription to have her large and portable oxygen machines refilled through [employer]. Please advise.

## 2018-02-03 NOTE — TELEPHONE ENCOUNTER
Hypercapnic respiratory failure, chronic  -     OXYGEN FOR HOME USE    Chronic bronchitis, unspecified chronic bronchitis type  -     OXYGEN FOR HOME USE

## 2018-02-05 NOTE — TELEPHONE ENCOUNTER
Order for oxygen placed by Dr. Bird.  Orders faxed to [facility] per patient's request. Patient notified orders received and faxed Verbalized understanding.

## 2018-02-06 ENCOUNTER — TELEPHONE (OUTPATIENT)
Dept: FAMILY MEDICINE | Facility: CLINIC | Age: 68
End: 2018-02-06

## 2018-02-06 NOTE — TELEPHONE ENCOUNTER
----- Message from Jannie Pierre sent at 2/6/2018 10:56 AM CST -----  Jolanta is calling stating that home oxygen orders need STAT levels in the note,   STAT level was....at room air....at rest. Please call Trudi, [phone] fax # [phone]

## 2018-02-08 ENCOUNTER — TELEPHONE (OUTPATIENT)
Dept: FAMILY MEDICINE | Facility: CLINIC | Age: 68
End: 2018-02-08

## 2018-02-08 RX ORDER — TIZANIDINE 4 MG/1
4 TABLET ORAL EVERY 8 HOURS
Qty: 30 TABLET | Refills: 2 | Status: SHIPPED | OUTPATIENT
Start: 2018-02-08 | End: 2018-02-18

## 2018-02-08 NOTE — TELEPHONE ENCOUNTER
Received fax from [facility] stating Methocarbamol is not covered by insurance, prior authorization required. Please advise.

## 2018-02-08 NOTE — TELEPHONE ENCOUNTER
Spoke to Trudi with [employer] who states she received an order for oxygen for patient. States patient previously received oxygen from another company and insurance will not authorize oxygen from [employer] as a result. Call placed to patient to discuss. No answer received. Message left requesting return call to office.

## 2018-02-09 ENCOUNTER — TELEPHONE (OUTPATIENT)
Dept: FAMILY MEDICINE | Facility: CLINIC | Age: 68
End: 2018-02-09

## 2018-02-09 ENCOUNTER — OFFICE VISIT (OUTPATIENT)
Dept: CARDIOLOGY | Facility: CLINIC | Age: 68
End: 2018-02-09
Payer: MEDICARE

## 2018-02-09 ENCOUNTER — OUTPATIENT CASE MANAGEMENT (OUTPATIENT)
Dept: ADMINISTRATIVE | Facility: OTHER | Age: 68
End: 2018-02-09

## 2018-02-09 VITALS
WEIGHT: 293 LBS | OXYGEN SATURATION: 92 % | SYSTOLIC BLOOD PRESSURE: 122 MMHG | DIASTOLIC BLOOD PRESSURE: 74 MMHG | HEART RATE: 96 BPM | HEIGHT: 68 IN | BODY MASS INDEX: 44.41 KG/M2

## 2018-02-09 DIAGNOSIS — I48.20 CHRONIC A-FIB: ICD-10-CM

## 2018-02-09 DIAGNOSIS — E11.40 TYPE 2 DIABETES MELLITUS WITH DIABETIC NEUROPATHY, WITHOUT LONG-TERM CURRENT USE OF INSULIN: Chronic | ICD-10-CM

## 2018-02-09 DIAGNOSIS — I50.42 CHRONIC COMBINED SYSTOLIC AND DIASTOLIC CONGESTIVE HEART FAILURE: ICD-10-CM

## 2018-02-09 DIAGNOSIS — I48.20 CHRONIC ATRIAL FIBRILLATION: Primary | ICD-10-CM

## 2018-02-09 PROCEDURE — 99213 OFFICE O/P EST LOW 20 MIN: CPT | Mod: S$GLB,,, | Performed by: INTERNAL MEDICINE

## 2018-02-09 PROCEDURE — 99999 PR PBB SHADOW E&M-EST. PATIENT-LVL III: CPT | Mod: PBBFAC,,, | Performed by: INTERNAL MEDICINE

## 2018-02-09 PROCEDURE — 93000 ELECTROCARDIOGRAM COMPLETE: CPT | Mod: S$GLB,,, | Performed by: INTERNAL MEDICINE

## 2018-02-09 PROCEDURE — 3008F BODY MASS INDEX DOCD: CPT | Mod: S$GLB,,, | Performed by: INTERNAL MEDICINE

## 2018-02-09 PROCEDURE — 99499 UNLISTED E&M SERVICE: CPT | Mod: S$GLB,,, | Performed by: INTERNAL MEDICINE

## 2018-02-09 PROCEDURE — 1159F MED LIST DOCD IN RCRD: CPT | Mod: S$GLB,,, | Performed by: INTERNAL MEDICINE

## 2018-02-09 PROCEDURE — 1125F AMNT PAIN NOTED PAIN PRSNT: CPT | Mod: S$GLB,,, | Performed by: INTERNAL MEDICINE

## 2018-02-09 RX ORDER — ALBUTEROL SULFATE 0.63 MG/3ML
SOLUTION RESPIRATORY (INHALATION)
COMMUNITY
Start: 2018-02-07 | End: 2018-08-04 | Stop reason: SDUPTHER

## 2018-02-09 RX ORDER — DULOXETIN HYDROCHLORIDE 30 MG/1
CAPSULE, DELAYED RELEASE ORAL
COMMUNITY
Start: 2018-01-23 | End: 2018-06-02

## 2018-02-09 NOTE — TELEPHONE ENCOUNTER
----- Message from Max Arenas sent at 2/9/2018  2:46 PM CST -----  Contact: same  Patient called in and requested a message be sent over regarding her oxygen.  Patient found a company that will take supply oxygen to patient and takes her [employer].    1.  [facility]  2.  [facility]  3.  [facility] [place]    Patient call back number is [phone]

## 2018-02-09 NOTE — PROGRESS NOTES
2018  Follow up call to patient today.  Informed patient that [employer] is not her oxygen supplier.  Pt. Is aware, and she is not sure but believes [employer] may have changed the provider somewhere between when she first went on oxygen and now.  Pt. Reports having to call Camryn for a new card as her current card .  Pt. States she will find out the supplier.  Encouraged patient to call me with the information and I can assist in getting her the supplies needed from correct company.  Call made to [facility] -[phone]  And spoke to Charbel she states patient has gotten oxygen supplies from them in the past.  Informed her of tank refill order, and per charbel patient does not have tanks, she has a concentrator.  Attempted to call patient back and confirm, and was able to leave detailed vm.  Encouraged patient to call [facility] re her needs.  Will follow up with patient on Monday to further assist her.      Follow up Plan:    Facilitate to needed JUANCHO Zhu RN

## 2018-02-09 NOTE — TELEPHONE ENCOUNTER
Please see attached message from Trudi with [employer].  Patient notified. States the company listed on her oxygen tanks is [employer]. Patient states she will call [employer] to clarify.

## 2018-02-09 NOTE — TELEPHONE ENCOUNTER
Please see attached message from patient. Oxygen orders faxed to [facility] as requested by patient. Patient notified. Verbalized understanding.

## 2018-02-09 NOTE — TELEPHONE ENCOUNTER
Robaxin no longer covered by insurance. Order discontinued, new order for Zanaflex placed by Dr. Bird. Patient notified. Verbalized understanding.

## 2018-02-12 ENCOUNTER — OUTPATIENT CASE MANAGEMENT (OUTPATIENT)
Dept: ADMINISTRATIVE | Facility: OTHER | Age: 68
End: 2018-02-12

## 2018-02-12 NOTE — PROGRESS NOTES
2/12/2018  Followed up with patient this am, regarding DME.  Pt. Reports that [facility] are able to supply her oxygen needs.  [facility] resp phone # [phone]  Fax # [phone].  Pt. Reports no other needs at this time.  Noted messaged from physician staff Oxygen orders sent to DME on 2/9/2018  Encouraged patient to call with any new needs, confirmed she has my contact information.  JUANITA Zhu

## 2018-02-14 ENCOUNTER — OUTPATIENT CASE MANAGEMENT (OUTPATIENT)
Dept: ADMINISTRATIVE | Facility: OTHER | Age: 68
End: 2018-02-14

## 2018-02-14 NOTE — PROGRESS NOTES
Please note an Outpatient Complex Care Management welcome packet and consent form was created and mailed to the patient on 2/14/18.    Please contact [facility] at ext. [phone] with any questions.    Thank you,    Maureen Cabezas, [facility]  Outpatient Complex Care Mgmt  Ext [phone]

## 2018-02-14 NOTE — LETTER
February 14, 2018    Nelly Tian  [address] [place] [id number]             Outpatient Case Management  [address]  [facility] [address] Dear Nelly Tian:    We understand that receiving many services from different doctors and healthcare providers is overwhelming. There are appointments to make, transportation to arrange, dietary instructions to understand, and new medications to obtain.    This is where [facility] can help.     You are eligible to receive Outpatient Case Management services when you have healthcare needs that require the coordination of many providers, treatments, and services. You also qualify if you need assistance with a new treatment plan.     There is no charge for this support. You may have been referred to this program from your doctor(s), hospital staff member(s), or insurance company but you always have a choice to participate or not participate. To participate, you must give us your permission to be enrolled.     When you are enrolled in the [facility] program, the  who is assigned to you is    Kimberlyn Sykes RN  [phone]    Depending on your needs and wishes, your  may speak with you by phone, visit you at your place of living (for example your home, skilled nursing facility, or rehabilitation facility), or meet you at your doctors office.     Your  will tell you why you have been selected to participate in the program and will complete an assessment of your needs. Then a personalized plan of care will be developed with you and or your caregiver.             Here are examples of the services your [facility]  provides.     Coordinate communication among multiple providers.   Arrange for transportation, doctors visits, durable medical equipment, home care services, and special clinics.    Provide coaching on how to manage your health condition.     Answer questions about your health condition.   Help you understand your doctors treatment plan.    Provide additional instruction about your health condition, treatments, and medications.    Help you obtain information about your insurance coverage.    Advocate for your individual needs.    Request a Licensed Clinical  (LCSW) to visit you if you need their services. LCSWs help with long term planning (discussing placement options, advanced planning directives), financial planning, and assistance (for example rent, utilities, medication funding).     Your  will coordinate their activities with other outpatient services you are receiving. All services provided by [facility]  are coordinated with and communicated to your primary care physician.    Our goal is to help you manage your health condition(s) safely within your living environment, whether that is your home or a medical facility. We want to help you function at the healthiest and highest level possible.     Sincerely,      Forrest Mario MD  Medical Director    Enclosures:    Frequently Asked Questions  Patient Rights and Responsibilities   Reporting a Grievance or Complaint  Consent/Release of Information  Stamped Addressed Envelope                  Frequently Asked Questions about [facility]    What is [facility]?  Outpatient Case management is not Home healthcare services. [facility]  do not provide hands-on care. [facility]  will work with your doctor to arrange for home health services, if needed. Home health services have a limited duration and there are some restrictions as to who can get these services. There is no prescribed limit to the amount of time you receive [facility] services. [facility]  are not agents of your insurance company. However, [facility]  can help you obtain information from your insurance company.     Who are the [facility] ?  [facility]  are Registered Nurses and Social Workers. It is important to remember that you and your  are a team that works together with your primary care physician to create your individualized plan of care. The ultimate goal of your care plan is to help you implement your doctors treatment plan and to help you function at the highest level of health possible.     What are my rights as a patient?  It is important for you to know and understand your rights and responsibilities while receiving services from the [facility] program. We have enclosed a complete description of your rights and responsibilities. You can help to make your care more effective when you understand your right and responsibilities.     What is needed to be enrolled in the program?  You are only enrolled in the [facility] Program when you give us your consent to participate. You will find enclosed a consent form. You are receiving this letter because you or your caregivers have given us a verbal consent to enroll you in [facility] Program. We ask that you sign and return the enclosed written consent in the stamped self-addressed envelope.                           Patient Rights and Responsibilities    We consider you a partner in your care. When you are well informed, participate in treatment decisions and communicate openly with your doctor and other healthcare professionals, you help make your care more effective.     While you are in the Outpatient Case Management Program, your rights include the following:     You have a right to be provided services in a non-discriminatory manner in accordance with the provisions of Title VI of the Civil Rights Act of 1964, Section 504 of the Rehabilitation Act of  1973, the Age Discrimination Act of 1975, the Americans with Disabilities Act as well as any other applicable Federal and State laws and regulations.     You have the right to a reasonable, timely response to your request or need for care, as well as the right to considerate and respectful care including an environment that preserves dignity and contributes to a positive self-image. You are responsible for being considerate and respectful of our staff.     You have a right to information regarding patient rights, advocacy services and complaint mechanisms, and the right to prompt resolution of any complaint. You or a designee has the right to participate in the resolution of ethical issues surrounding your care. You have a right to file a complaint if you feel that your rights have been infringed, without fear or penalty from [facility] or the federal government. You may file a complaint with the Director of Outpatient Case Management by calling [phone]. At any time, you may lodge a grievance with the [facility] by calling [phone], or the Joint Commission on Accreditation of Healthcare Organizations at ([phone].     You, or someone acting on your behalf, have the right to understandable information on your health status, treatment and progress in order to make decisions. You have the right to know the nature, risks and alternatives to treatment. You have the right to be informed, when appropriate, regarding the outcome of the care that has been provided. You have the right to refuse treatment to the extent permitted by law, and the right to be informed of the alternatives and consequences of refusing treatment.     You, in collaboration with your physician, have the right to make decisions regarding care and the right to participate in the development and implementation of the plan of care and effective pain management. You have the right to know the name and  professional status of those responsible for the delivery of your care and treatment.       You have a right, within legal guidelines, to have a guardian, next-of-kin or legal designee exercises your patient rights when you are unable to do so. You have the right for your wishes regarding end-of-life decisions to be addressed by the healthcare team through advance directives. You have the right to personal privacy and confidentiality and to expect confidentiality of all records and communications pertaining to your care.      You have the right to receive communications about your health information confidentially. You have the right to request restrictions on the uses and disclosures of your health information. You have the right to inspect, copy, request amendments and receive an accounting of to whom we have disclosed your health information.     You have the right to be provided with interpretation services if you do not speak English; to alternative communication techniques if you are hearing or vision impaired; and to have any other resources needed on your behalf to ensure effective communication. These services are provided free of charge.     You have a right to personal safety (free from mental, physical, sexual and verbal abuse, neglect and exploitation). You have the right to access protective and advocacy services.     Advance Directives  A Patient Advocate is available to meet with patients to answer questions regarding advance directives.    Living Will  A document that outlines what medical treatment the patient does or does not want in the event the patient becomes unable to make those decisions at the appropriate time.    Durable Medical Power of   A document by which the patient designates an individual to be responsible for making medical decisions in the event the patient becomes unable to do so.    HIPAA Notice of Privacy Practices  Your medical information is governed by federal  privacy laws. HIPAA protects private medical information and how that information is disclosed. If you have a question regarding the HIPAA Notice of Privacy Practices, or if you believe your privacy rights have been violated, you may call our designated hotline at [phone].            Quality Improvement  Because we consistently strive to improve the care and service provided to our patients, we welcome your feedback. Your comments are an important part of our quality improvement process, as we like to know what we are doing right and which areas are in need of improvement. Our policy is to listen, be responsive and provide you with an appropriate and timely follow-up to your questions or concerns. Our goal is active patient and family involvement in all aspects of the care process.                Reporting a Grievance or Complaint    During your time with the [facility] team you may have a grievance or complaint with our services. Your Patients Bill of Rights gives patients, families, and caregivers the right to express concerns and grievances and the right to expect a reasonable and timely response.     Your presentation of your concerns is not viewed negatively. It is an opportunity for us to improve the quality of our care and services we provide to you.     You may report your concerns directly to your , or you can phone in a complaint to:     Director of Outpatient Case Management  [phone]    You may also send a complaint letter to:    Director of Outpatient Case Management Services  [address] [place] [address]    Tell us the details of your complaint and provide us with a contact phone number so we can contact you to obtain additional information. We will return a call to you within two business days of our receipt of your complaint, and to request additional information as needed. If you choose to mail a letter, your complaint may take a few  days longer to reach us.     All grievances will be addressed as quickly as possible. A grievance or complaint that involves situations or practices which place patients in immediate danger will be addressed as an urgent matter. We will work to resolve all other complaints within seven days of receipt. By that time, you will receive a phone call with either the resolution of your complaint, or a plan for corrective action. A formal written response will be sent to you within 30 days of receipt of your grievance.     If a resolution cannot be completed within 30 days, a letter will be sent to you or your family member with an estimated time for the final response.    Additionally, all patients have the right to file complaints with external agencies, without exception. Complaints/grievances can be addressed to the following agencies:            Patient Safety or Quality of Care Concerns  Office of Quality Monitoring   The [facility] [place] [address]  [place] [address]  [phone] Toll Free    HIPPA Privacy/Security Concerns  Office for Civil Rights Region IV  [place] Department of Health & Human Services  [address] [place] [address], [address]  [place] [address] [phone] Phone  [phone] TDD  [phone] Toll Free    Medicare/Medicaid Billing Concerns  [employer] Services  Region [address]  [address] [place] [address], [address]  [place] [address] [phone] Phone  [phone] Toll Free    General Concerns  [facility]  [phone] Toll Free Complaint Hotline                                  Consent Form/Release of Information    By signing--     (1) I agree I have read the Outpatient Case Management information provided to me;     (2) I agree to voluntarily participate in the Outpatient Case Management program;     (3) I understand I must consent to participation in the Outpatient Case Management program during my first interview  with my ;    (4) I consent to the discussion and release of my personal health information to my healthcare team (including my personal physician, my medical home care team, any specialty physician(s), and my [facility] team);     (5) I agree my consent is valid for the length of time I am receiving Outpatient Case Management;    (6) I agree to referrals to community resources which my Case Management team recommends for me. I agree to the release of my personal information and personal health information as necessary to referral sources.    ___________________________________________________________________  Patients Printed Name     ___________________________________________________________________  Patients Signature       Date    If patient is in being cared for, please complete this section:     ___________________________________________________________________  Printed Name of Person Caring For Patient   Relationship To Patient    ___________________________________________________________________   Signature of Person Caring For Patient     Date    PLEASE SIGN AND RETURN IN THE ENCLOSED PRE-ADDRESSED ENVELOPE.

## 2018-02-15 ENCOUNTER — TELEPHONE (OUTPATIENT)
Dept: FAMILY MEDICINE | Facility: CLINIC | Age: 68
End: 2018-02-15

## 2018-02-15 NOTE — TELEPHONE ENCOUNTER
----- Message from Aubrie Langston sent at 2/14/2018 11:14 AM CST -----  Contact: self  [phone]  Patient called asking for an order for rolator walker with a seat faxed to [facility]  Thanks!

## 2018-02-21 ENCOUNTER — TELEPHONE (OUTPATIENT)
Dept: FAMILY MEDICINE | Facility: CLINIC | Age: 68
End: 2018-02-21

## 2018-02-21 NOTE — TELEPHONE ENCOUNTER
Spoke with patient who states she was advised by [facility] her O2 Sat levels listed in the past months clinical notes are too high to be certified for oxygen.  Writer requested to schedule a nurse visit to perform a walk and monitor of oxygen levels for documentation for oxygen re-certification. Patient states she is sitting with her mother who is currently on hospice and unable to schedule nurse visit at this time. Writer advised patient to call and schedule nurse visit when she is available. Patient verbalized understanding.

## 2018-02-21 NOTE — TELEPHONE ENCOUNTER
----- Message from Polo Soto sent at 2/20/2018 11:42 AM CST -----  Contact: Pt   Pt is calling stating she needs to speak with a nurse about her oxygen. Please give pt a call back at [phone] (home)

## 2018-02-22 NOTE — TELEPHONE ENCOUNTER
Patient notified. Verbalized understanding. States she is sitting with her mother who is on hospice. States she will call to schedule appointment at later date.

## 2018-02-26 ENCOUNTER — OUTPATIENT CASE MANAGEMENT (OUTPATIENT)
Dept: ADMINISTRATIVE | Facility: OTHER | Age: 68
End: 2018-02-26

## 2018-02-26 NOTE — PROGRESS NOTES
2/26/2018 1st Attempt to complete follow-up for Outpatient Care Management; left message requesting return call.  JUANITA Zhu

## 2018-02-27 NOTE — PROGRESS NOTES
[facility]    CC: Follow up visit    Patient ID: Nelly Tian is a 67 y.o. female with a past medical history of atrial fibrillation, congestive heart failure, diabetes  HPI  She has switched to Eliquis after our discussion.  She is feeling well.  No significant complaints.  She is here for the results of her echocardiography    Past Medical History:   Diagnosis Date    *Atrial flutter     Angina pectoris 2017    Anxiety     Arthritis     Asthma     Atrial fibrillation     Back pain     Cataract     OD    CHF (congestive heart failure)     COPD (chronic obstructive pulmonary disease)     Depression     Diabetes mellitus     Emphysema of lung     Heart failure     Hepatomegaly 2/3/2016    Hernia     History of MI (myocardial infarction) 2016    Hypercapnic respiratory failure, chronic 2016    Hyperlipidemia     Hypertension     Iron deficiency anemia 2/3/2016    Myocardial infarction     Obesity     Peripheral vascular disease     Pneumonia     Polyneuropathy     Retinal detachment     OS    Septic shock 2017    Skin ulcer 3/18/2017    Tobacco dependence     Type II 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, not stated as uncontrolled(250.60)      Past Surgical History:   Procedure Laterality Date    BREAST BIOPSY Left 10 yrs ago    benign    CATARACT EXTRACTION Left     OS      SECTION      x2    EYE SURGERY      HYSTERECTOMY      partial    OOPHORECTOMY      one ovary conserved    RETINAL DETACHMENT SURGERY      buckle --OS    TONSILLECTOMY       Social History     Social History    Marital status:      Spouse name: N/A    Number of children: N/A    Years of education: N/A     Occupational History    Not on file.     Social History Main Topics    Smoking status: Current Every Day Smoker     Packs/day: 0.25     Years: 50.00     Types: Cigarettes     Start date: 1968    Smokeless  tobacco: Never Used    Alcohol use No    Drug use: No    Sexual activity: Not Currently     Other Topics Concern    Not on file     Social History Narrative    No narrative on file     Family History   Problem Relation Age of Onset    Arthritis Father     Heart disease Father     Early death Sister 30     heart disease    Heart disease Sister 32     MI    No Known Problems Brother     No Known Problems Daughter     Blindness Son      due to accident//    Arthritis Sister     Heart disease Sister     Crohn's disease Sister     Cancer Brother      asbestosis    Alcohol abuse Mother     Breast cancer Neg Hx     Glaucoma Neg Hx     Macular degeneration Neg Hx     Retinal detachment Neg Hx     Amblyopia Neg Hx     Diabetes Neg Hx     Cataracts Neg Hx     Hypertension Neg Hx     Strabismus Neg Hx     Stroke Neg Hx     Thyroid disease Neg Hx        Review of patient's allergies indicates:   Allergen Reactions    Dilaudid [hydromorphone] Anaphylaxis     Other reaction(s): Anaphylaxis  Other reaction(s): Unknown       Medication List with Changes/Refills   Current Medications    ALBUTEROL (ACCUNEB) 0.63 MG/3 ML NEBU        ALBUTEROL (VENTOLIN HFA) 90 MCG/ACTUATION INHALER    INHALE TWO PUFFS EVERY 6 HOURS AS NEEDED FOR WHEEZING    ALBUTEROL-IPRATROPIUM 2.5MG-0.5MG/3ML (DUO-NEB) 0.5 MG-3 MG(2.5 MG BASE)/3 ML NEBULIZER SOLUTION    INHALE THE CONTENTS OF 1 VIAL VIA NEBULIZER EVERY 6 HOURS AS NEEDED FOR  WHEEZING (DO NOT USE WITH ALBUTEROL INHALER)    APIXABAN 5 MG TAB    Take 1 tablet (5 mg total) by mouth 2 (two) times daily.    ASCORBIC ACID, VITAMIN C, (VITAMIN C) 500 MG TABLET    Take 1 tablet (500 mg total) by mouth every evening.    ATORVASTATIN (LIPITOR) 20 MG TABLET    TAKE 1 TABLET (20 MG TOTAL) BY MOUTH ONCE DAILY.    BUDESONIDE (PULMICORT) 0.5 MG/2 ML NEBULIZER SOLUTION    Take 3 mLs (0.75 mg total) by nebulization once daily.    CEPHALEXIN (KEFLEX) 500 MG CAPSULE    TAKE 1 CAPSULE ONE  TIME DAILY AT 6:00A.M.    CLONAZEPAM (KLONOPIN) 1 MG TABLET    1/2 to1 tab at night    DOXYCYCLINE (MONODOX) 100 MG CAPSULE    Take 1 capsule (100 mg total) by mouth 2 (two) times daily.    DULOXETINE (CYMBALTA) 30 MG CAPSULE        FUROSEMIDE (LASIX) 40 MG TABLET    TAKE ONE TABLET TWICE A DAYFOR FLUID OVERLOAD    GABAPENTIN (NEURONTIN) 800 MG TABLET    Take 1 tablet (800 mg total) by mouth 3 (three) times daily.    HYDROCODONE-ACETAMINOPHEN 10-325MG (NORCO)  MG TAB    Take 1 tablet by mouth every 8 (eight) hours as needed for Pain (Do not take more than 3 a day. Do not mix with other narcotics.).    KLOR-CON M20 20 MEQ TABLET    TAKE ONE TABLET BY MOUTH ONCE DAILY    LEVALBUTEROL (XOPENEX) 0.63 MG/3 ML NEBULIZER SOLUTION    INHALE THE CONTENTS OF 1 VIAL BY NEBULIZATION 4 times  DAILY.    LISINOPRIL (PRINIVIL,ZESTRIL) 20 MG TABLET    TAKE 1 TABLET EVERY DAY    MAGNESIUM OXIDE (MAG-OX) 400 MG TABLET    Take 1 tablet (400 mg total) by mouth 2 (two) times daily.    MELOXICAM (MOBIC) 7.5 MG TABLET    TAKE 1 TABLET EVERY DAY    METFORMIN (GLUCOPHAGE) 500 MG TABLET    TAKE 1 TABLET TWICE DAILY WITH MEALS    METOPROLOL SUCCINATE (TOPROL-XL) 25 MG 24 HR TABLET    TAKE 1/2 TABLET EVERY DAY    MULTIVITAMIN (THERAGRAN) TABLET    Take 1 tablet by mouth once daily.    NYSTATIN (MYCOSTATIN) CREAM    Apply topically 2 (two) times daily.    NYSTATIN (MYCOSTATIN) POWDER    APPLY  POWDER TOPICALLY 4 TIMES DAILY    NYSTATIN-TRIAMCINOLONE (MYCOLOG II) CREAM    APPLY SPARINGLY TO AFFECTED AREA(S) 3 TIMES DAILY AS NEEDED    POLYETHYLENE GLYCOL (GLYCOLAX) 17 GRAM/DOSE POWDER    MIX 1 CAPFUL (17GM) IN LIQUID AND DRINK BEFORE BREAKFAST    SPIRONOLACTONE (ALDACTONE) 25 MG TABLET    Take 1 tablet (25 mg total) by mouth once daily.    TEMAZEPAM (RESTORIL) 30 MG CAPSULE    TAKE 1 CAPSULE BY MOUTH AT NIGHT AS NEEDED.    TRAZODONE (DESYREL) 150 MG TABLET    Take 0.5 tablets (75 mg total) by mouth every evening.    TRUE METRIX GLUCOSE TEST  "STRIP STRP    TEST BLOOD SUGAR THREE TIMES DAILY    TRUEPLUS LANCETS 33 GAUGE MISC    USE THREE TIMES DAILY TO TEST BLOOD SUGAR       Review of Systems  Constitution: Denies chills, fever, and sweats.  HENT: Denies headaches or blurry vision.  Cardiovascular: Denies chest pain or irregular heart beat.  Respiratory: Denies cough or shortness of breath.  Gastrointestinal: Denies abdominal pain, nausea, or vomiting.  Musculoskeletal: Denies muscle cramps.  Neurological: Denies dizziness or focal weakness.  Psychiatric/Behavioral: Normal mental status.  Hematologic/Lymphatic: Denies bleeding problem or easy bruising/bleeding.  Skin: Denies rash or suspicious lesions    Physical Examination  /74 (BP Location: Right arm, Patient Position: Sitting)   Pulse 96   Ht 5' 8" (1.727 m)   Wt (!) 140.6 kg (309 lb 15.5 oz)   SpO2 (!) 92%   BMI 47.13 kg/m²     Constitutional: No acute distress, conversant  HEENT: Sclera anicteric, Pupils equal, round and reactive to light, extraocular motions intact, Oropharynx clear  Neck: No JVD, no carotid bruits  Cardiovascular: regular rate and rhythm, no murmur, rubs or gallops, normal S1/S2  Pulmonary: Clear to auscultation bilaterally  Abdominal: Abdomen soft, nontender, nondistended, positive bowel sounds  Extremities: No lower extremity edema,   Pulses:  Carotid pulses are 2+ on the right side, and 2+ on the left side.  Radial pulses are 2+ on the right side, and 2+ on the left side.   Femoral pulses are 2+ on the right side, and 2+ on the left side.  Popliteal pulses are 2+ on the right side, and 2+ on the left side.   Dorsalis pedis pulses are 2+ on the right side, and 2+ on the left side.   Posterior tibial pulses are 2+ on the right side, and 2+ on the left side.    Skin: No ecchymosis, erythema, or ulcers  Psych: Alert and oriented x 3, appropriate affect  Neuro: CNII-XII intact, no focal deficits    Labs:  Most Recent Data  CBC:   Lab Results   Component Value Date    WBC " 8.28 01/24/2018    HGB 11.3 (L) 01/24/2018    HCT 37.0 01/24/2018     01/24/2018    MCV 97 01/24/2018    RDW 14.9 (H) 01/24/2018     BMP:   Lab Results   Component Value Date     10/11/2017    K 4.0 10/11/2017     10/11/2017    CO2 29 10/11/2017    BUN 17 10/11/2017    CREATININE 0.8 10/11/2017     (H) 10/11/2017    CALCIUM 9.0 10/11/2017    MG 2.0 04/25/2017    PHOS 3.1 04/25/2017     LFTS;   Lab Results   Component Value Date    PROT 7.2 10/11/2017    ALBUMIN 3.3 (L) 10/11/2017    BILITOT 0.3 10/11/2017    AST 19 10/11/2017    ALKPHOS 86 10/11/2017    ALT 26 10/11/2017     COAGS:   Lab Results   Component Value Date    INR 2.1 10/17/2017     FLP:   Lab Results   Component Value Date    CHOL 117 (L) 05/08/2017    HDL 33 (L) 05/08/2017    LDLCALC 38.6 (L) 05/08/2017    TRIG 227 (H) 05/08/2017    CHOLHDL 28.2 05/08/2017     CARDIAC:   Lab Results   Component Value Date    TROPONINI <0.006 09/18/2017     (H) 01/24/2018       Imaging:    EKG:   Atrial fibrillation  Incomplete right bundle branch block  Left posterior fascicular block  Echo:  CONCLUSIONS     1 - Low normal to mildly depressed left ventricular systolic function (EF 50-55%).     2 - The estimated PA systolic pressure is greater than 22 mmHg.     3 - Mild aortic regurgitation.     4 - Mild mitral regurgitation.     Stress Testing:      I have personally reviewed these images and echo data    Assessment/Plan:  Diagnoses and all orders for this visit:    Chronic atrial fibrillation    Chronic a-fib  -     EKG 12-lead    Chronic combined systolic and diastolic congestive heart failure    Type 2 diabetes mellitus with diabetic neuropathy, without long-term current use of insulin      I went over the results of her echocardiography with her.  Recommendation regarding healthy lifestyle, diet and exercise given to patient.        Follow-up in about 8 months (around 10/9/2018).          Total duration of face to face visit time 30  minutes.  Total time spent counseling greater than fifty percent of total visit time.  Counseling included discussion regarding imaging findings, diagnosis, possibilities, treatment options, risks and benefits.  The patient had many questions regarding the options and long-term effect which were all answered to my best ability.      Killian Cook MD,MRCP,RPVI,FACC,FSCAI.  Interventional Cardiology   Phone [phone]

## 2018-03-02 ENCOUNTER — OUTPATIENT CASE MANAGEMENT (OUTPATIENT)
Dept: ADMINISTRATIVE | Facility: OTHER | Age: 68
End: 2018-03-02

## 2018-03-02 NOTE — PROGRESS NOTES
3/2/2018  Second attempt to complete follow up for Outpatient Care Management, left message requesting a return call.  JUANITA Zhu

## 2018-03-03 RX ORDER — POLYETHYLENE GLYCOL 3350 17 G/17G
POWDER, FOR SOLUTION ORAL
Qty: 1530 G | Refills: 11 | Status: SHIPPED | OUTPATIENT
Start: 2018-03-03 | End: 2018-03-30 | Stop reason: SDUPTHER

## 2018-03-05 DIAGNOSIS — M43.12 SPONDYLOLISTHESIS OF CERVICAL REGION: ICD-10-CM

## 2018-03-05 DIAGNOSIS — M51.36 LUMBAR DEGENERATIVE DISC DISEASE: ICD-10-CM

## 2018-03-05 NOTE — TELEPHONE ENCOUNTER
Patient requesting a refill of Hydrocodone.  LR--1-30-18  LOV--1-29-18  FOV--5-21-18  Urine Toxicology-- 10-25-17  Pain Contract--10-25-17

## 2018-03-05 NOTE — TELEPHONE ENCOUNTER
----- Message from Yaniv Kulkarni sent at 3/5/2018 11:39 AM CST -----  Contact: Patient  Nelly, [phone], calling in regards to refill for hydrocodone-acetaminophen 10-325mg (NORCO)  mg Tab. Said she is almost out and the refill date is due on 3/7/18. Please advise. Thanks.    [facility] [address] [facility] [address]  Phone: [phone] Fax: [phone]

## 2018-03-06 ENCOUNTER — TELEPHONE (OUTPATIENT)
Dept: FAMILY MEDICINE | Facility: CLINIC | Age: 68
End: 2018-03-06

## 2018-03-06 DIAGNOSIS — M51.36 LUMBAR DEGENERATIVE DISC DISEASE: ICD-10-CM

## 2018-03-06 RX ORDER — HYDROCODONE BITARTRATE AND ACETAMINOPHEN 10; 325 MG/1; MG/1
1 TABLET ORAL EVERY 8 HOURS PRN
Qty: 90 TABLET | Refills: 0 | Status: SHIPPED | OUTPATIENT
Start: 2018-03-06 | End: 2018-04-05 | Stop reason: SDUPTHER

## 2018-03-06 RX ORDER — CLONAZEPAM 1 MG/1
1 TABLET ORAL NIGHTLY PRN
Qty: 20 TABLET | Refills: 1 | Status: SHIPPED | OUTPATIENT
Start: 2018-03-06 | End: 2018-06-02

## 2018-03-06 NOTE — TELEPHONE ENCOUNTER
Please see attached message from patient. Refill request was forwarded to Dr. Bird on yesterday 3-5-18.  Awaiting MD response. Patient notified. Verbalized understanding.

## 2018-03-06 NOTE — TELEPHONE ENCOUNTER
----- Message from Trish Hays sent at 3/6/2018  1:18 PM CST -----  Contact: Nelly  Patient is calling again regarding refill request Rx hydrocodone-acetaminophen 10-325mg (NORCO)  mg Tab, Take 1 tablet by mouth every 8 (eight) hours as needed for Pain (Do not take more than 3 a day. Do not mix with other narcotics.) to be sent to     [facility] [address] [place] [address]  Phone: [phone] Fax: [phone]

## 2018-03-07 ENCOUNTER — TELEPHONE (OUTPATIENT)
Dept: FAMILY MEDICINE | Facility: CLINIC | Age: 68
End: 2018-03-07

## 2018-03-07 NOTE — TELEPHONE ENCOUNTER
Patient requesting refill of Hydrocodone. Upon further inspection it was noted this medication was e-scribed on 3-6-18. Pharmacy confirmed receipt on 3-6-18 at  8:38 pm. Patient notified. Verbalized understanding.

## 2018-03-07 NOTE — TELEPHONE ENCOUNTER
----- Message from Saskia Sun sent at 3/7/2018  3:33 PM CST -----  Contact: self  Type:  RX Refill Request    Who Called:  self  Refill or New Rx:  refill  RX Name and Strength:  Norco  How is the patient currently taking it? (ex. 1XDay):    Is this a 30 day or 90 day RX:  30  Preferred Pharmacy with phone number:  [facility]  Local or Mail Order:  local  Ordering Provider:  Dr Bird  [facility] Call Back Number:  [phone]  Additional Information:  Patient requested prescription a couple of days agoand would like a call back.    [facility] [address] [place], [place] - [address]  [address] [place] [address]  [place] [address]  Phone: [phone] Fax: [phone]

## 2018-03-09 ENCOUNTER — OUTPATIENT CASE MANAGEMENT (OUTPATIENT)
Dept: ADMINISTRATIVE | Facility: OTHER | Age: 68
End: 2018-03-09

## 2018-03-09 NOTE — PROGRESS NOTES
"3/9/2018  0914 Follow up call completed with patient this am.  Pt. Reports loss of her mother recently.  Per patient she was on hospice after being discharged from the hospital.  Patient reports that her father is having a difficult time, she is concerned.  Encouraged patient to call the hospice company that was following her mother and they will be able to provide support to the family at this time.  Pt. Reports that she is doing "okay", encouraged her to take of her needs at this time as well.  Encouraged compliance with healthy diet, encouraged compliance with medications.  Pt. Denies any SOB at present, is on home oxygen.  Patient reports needing working with [facility] and rehab re DME needs, patient is aware she needs to go to clinic and get O2 sats documented for home oxygen.  Encouraged patient to get it done as soon as she is able, encouraged her to call office and set up appointment.  Pt. Verbalized understanding.  Patient is agreeable to follow up call in a couple weeks.    Follow up Plan:    Sparkle lawson COPD  Medication compliance  Appointment compliance    JUANITA Zhu      "

## 2018-03-12 ENCOUNTER — LAB VISIT (OUTPATIENT)
Dept: LAB | Facility: HOSPITAL | Age: 68
End: 2018-03-12
Attending: INTERNAL MEDICINE
Payer: MEDICARE

## 2018-03-12 DIAGNOSIS — J43.9 PULMONARY EMPHYSEMA, UNSPECIFIED EMPHYSEMA TYPE: Primary | ICD-10-CM

## 2018-03-12 DIAGNOSIS — Z86.2 HISTORY OF ANEMIA: ICD-10-CM

## 2018-03-12 LAB
BASOPHILS # BLD AUTO: 0.08 K/UL
BASOPHILS NFR BLD: 0.7 %
DIFFERENTIAL METHOD: ABNORMAL
EOSINOPHIL # BLD AUTO: 0.2 K/UL
EOSINOPHIL NFR BLD: 1.9 %
ERYTHROCYTE [DISTWIDTH] IN BLOOD BY AUTOMATED COUNT: 14.2 %
HCT VFR BLD AUTO: 46.2 %
HGB BLD-MCNC: 13.9 G/DL
IMM GRANULOCYTES # BLD AUTO: 0.05 K/UL
IMM GRANULOCYTES NFR BLD AUTO: 0.4 %
LYMPHOCYTES # BLD AUTO: 2.6 K/UL
LYMPHOCYTES NFR BLD: 23.7 %
MCH RBC QN AUTO: 28.7 PG
MCHC RBC AUTO-ENTMCNC: 30.1 G/DL
MCV RBC AUTO: 96 FL
MONOCYTES # BLD AUTO: 1.2 K/UL
MONOCYTES NFR BLD: 10.7 %
NEUTROPHILS # BLD AUTO: 7 K/UL
NEUTROPHILS NFR BLD: 62.6 %
NRBC BLD-RTO: 0 /100 WBC
PLATELET # BLD AUTO: 213 K/UL
PMV BLD AUTO: 11.4 FL
RBC # BLD AUTO: 4.84 M/UL
WBC # BLD AUTO: 11.13 K/UL

## 2018-03-12 PROCEDURE — 36415 COLL VENOUS BLD VENIPUNCTURE: CPT | Mod: PO

## 2018-03-12 PROCEDURE — 85025 COMPLETE CBC W/AUTO DIFF WBC: CPT

## 2018-03-12 PROCEDURE — 83540 ASSAY OF IRON: CPT

## 2018-03-12 RX ORDER — LEVALBUTEROL INHALATION SOLUTION 0.63 MG/3ML
SOLUTION RESPIRATORY (INHALATION)
Qty: 792 ML | Refills: 3 | Status: SHIPPED | OUTPATIENT
Start: 2018-03-12 | End: 2018-04-23 | Stop reason: SDUPTHER

## 2018-03-13 ENCOUNTER — CLINICAL SUPPORT (OUTPATIENT)
Dept: SMOKING CESSATION | Facility: CLINIC | Age: 68
End: 2018-03-13
Payer: COMMERCIAL

## 2018-03-13 DIAGNOSIS — F17.200 NICOTINE DEPENDENCE: Primary | ICD-10-CM

## 2018-03-13 LAB
IRON SERPL-MCNC: 56 UG/DL
SATURATED IRON: 12 %
TOTAL IRON BINDING CAPACITY: 469 UG/DL
TRANSFERRIN SERPL-MCNC: 317 MG/DL

## 2018-03-13 PROCEDURE — 99407 BEHAV CHNG SMOKING > 10 MIN: CPT | Mod: S$GLB,,,

## 2018-03-14 ENCOUNTER — OFFICE VISIT (OUTPATIENT)
Dept: HEMATOLOGY/ONCOLOGY | Facility: CLINIC | Age: 68
End: 2018-03-14
Payer: MEDICARE

## 2018-03-14 VITALS
RESPIRATION RATE: 24 BRPM | SYSTOLIC BLOOD PRESSURE: 137 MMHG | DIASTOLIC BLOOD PRESSURE: 61 MMHG | WEIGHT: 293 LBS | BODY MASS INDEX: 44.41 KG/M2 | TEMPERATURE: 98 F | HEART RATE: 90 BPM | HEIGHT: 68 IN

## 2018-03-14 DIAGNOSIS — R16.0 HEPATOMEGALY: ICD-10-CM

## 2018-03-14 DIAGNOSIS — J43.9 MIXED RESTRICTIVE AND OBSTRUCTIVE LUNG DISEASE: ICD-10-CM

## 2018-03-14 DIAGNOSIS — J98.4 MIXED RESTRICTIVE AND OBSTRUCTIVE LUNG DISEASE: ICD-10-CM

## 2018-03-14 DIAGNOSIS — Z86.2 HISTORY OF ANEMIA: ICD-10-CM

## 2018-03-14 DIAGNOSIS — D50.9 IRON DEFICIENCY ANEMIA, UNSPECIFIED IRON DEFICIENCY ANEMIA TYPE: Primary | ICD-10-CM

## 2018-03-14 DIAGNOSIS — F41.9 ANXIETY: ICD-10-CM

## 2018-03-14 PROCEDURE — 99214 OFFICE O/P EST MOD 30 MIN: CPT | Mod: S$GLB,,, | Performed by: INTERNAL MEDICINE

## 2018-03-14 PROCEDURE — 99499 UNLISTED E&M SERVICE: CPT | Mod: S$GLB,,, | Performed by: INTERNAL MEDICINE

## 2018-03-14 PROCEDURE — 3078F DIAST BP <80 MM HG: CPT | Mod: CPTII,S$GLB,, | Performed by: INTERNAL MEDICINE

## 2018-03-14 PROCEDURE — 99999 PR PBB SHADOW E&M-EST. PATIENT-LVL V: CPT | Mod: PBBFAC,,, | Performed by: INTERNAL MEDICINE

## 2018-03-14 PROCEDURE — 3075F SYST BP GE 130 - 139MM HG: CPT | Mod: CPTII,S$GLB,, | Performed by: INTERNAL MEDICINE

## 2018-03-14 RX ORDER — GABAPENTIN 600 MG/1
TABLET ORAL
COMMUNITY
Start: 2018-03-02 | End: 2018-04-13

## 2018-03-14 NOTE — PROGRESS NOTES
FOLLOWUP    CHIEF COMPLAINT:My back is killing me     Ms. Tian is a 67-year-old female who has a history of progressive anemia after    IV iron.    Here today for routine labs for anemia  She is coping with her mother's death and her father is not doing well   She is tolerating Glucophage for diabetes as well as Lexapro for depression and Zestril   for hypertension  Trazodone helping her sleep    Past Medical History:   Diagnosis Date    *Atrial flutter     Angina pectoris 9/18/2017    Anxiety     Arthritis     Asthma     Atrial fibrillation     Back pain     Cataract     OD    CHF (congestive heart failure)     COPD (chronic obstructive pulmonary disease)     Depression     Diabetes mellitus     Emphysema of lung     Heart failure     Hepatomegaly 2/3/2016    Hernia     History of MI (myocardial infarction) 1/19/2016    Hypercapnic respiratory failure, chronic 11/16/2016    Hyperlipidemia     Hypertension     Iron deficiency anemia 2/3/2016    Myocardial infarction     Obesity     Peripheral vascular disease     Pneumonia     Polyneuropathy     Retinal detachment     OS    Septic shock 4/23/2017    Skin ulcer 3/18/2017    Tobacco dependence     Type II 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, not stated as uncontrolled(250.60)          Current Outpatient Prescriptions:     albuterol (ACCUNEB) 0.63 mg/3 mL Nebu, , Disp: , Rfl:     albuterol (VENTOLIN HFA) 90 mcg/actuation inhaler, INHALE TWO PUFFS EVERY 6 HOURS AS NEEDED FOR WHEEZING, Disp: 18 g, Rfl: 0    albuterol-ipratropium 2.5mg-0.5mg/3mL (DUO-NEB) 0.5 mg-3 mg(2.5 mg base)/3 mL nebulizer solution, INHALE THE CONTENTS OF 1 VIAL VIA NEBULIZER EVERY 6 HOURS AS NEEDED FOR  WHEEZING (DO NOT USE WITH ALBUTEROL INHALER), Disp: 90 mL, Rfl: 4    apixaban 5 mg Tab, Take 1 tablet (5 mg total) by mouth 2 (two) times daily., Disp: 90 tablet, Rfl: 3    ascorbic acid, vitamin C, (VITAMIN C) 500 MG tablet,  Take 1 tablet (500 mg total) by mouth every evening., Disp: , Rfl:     atorvastatin (LIPITOR) 20 MG tablet, TAKE 1 TABLET (20 MG TOTAL) BY MOUTH ONCE DAILY., Disp: 90 tablet, Rfl: 3    budesonide (PULMICORT) 0.5 mg/2 mL nebulizer solution, Take 3 mLs (0.75 mg total) by nebulization once daily., Disp: 2 mL, Rfl: 3    cephALEXin (KEFLEX) 500 MG capsule, TAKE 1 CAPSULE ONE TIME DAILY AT 6:00A.M., Disp: 90 capsule, Rfl: 1    clonazePAM (KLONOPIN) 1 MG tablet, Take 1 tablet (1 mg total) by mouth nightly as needed for Anxiety. Dose has to be taper down., Disp: 20 tablet, Rfl: 1    doxycycline (MONODOX) 100 MG capsule, Take 1 capsule (100 mg total) by mouth 2 (two) times daily., Disp: 20 capsule, Rfl: 0    DULoxetine (CYMBALTA) 30 MG capsule, , Disp: , Rfl:     furosemide (LASIX) 40 MG tablet, TAKE ONE TABLET TWICE A DAYFOR FLUID OVERLOAD, Disp: 60 tablet, Rfl: 5    gabapentin (NEURONTIN) 800 MG tablet, Take 1 tablet (800 mg total) by mouth 3 (three) times daily., Disp: 270 tablet, Rfl: 3    hydrocodone-acetaminophen 10-325mg (NORCO)  mg Tab, Take 1 tablet by mouth every 8 (eight) hours as needed for Pain (Do not take more than 3 a day. Do not mix with other narcotics.)., Disp: 90 tablet, Rfl: 0    KLOR-CON M20 20 mEq tablet, TAKE ONE TABLET BY MOUTH ONCE DAILY, Disp: 30 tablet, Rfl: 6    levalbuterol (XOPENEX) 0.63 mg/3 mL nebulizer solution, INHALE THE CONTENTS OF 1 VIAL BY NEBULIZATION 4 times  DAILY.    DX: J43.9, Disp: 792 mL, Rfl: 3    lisinopril (PRINIVIL,ZESTRIL) 20 MG tablet, TAKE 1 TABLET EVERY DAY, Disp: 90 tablet, Rfl: 1    magnesium oxide (MAG-OX) 400 mg tablet, Take 1 tablet (400 mg total) by mouth 2 (two) times daily., Disp: , Rfl: 0    meloxicam (MOBIC) 7.5 MG tablet, TAKE 1 TABLET EVERY DAY, Disp: 90 tablet, Rfl: 1    metFORMIN (GLUCOPHAGE) 500 MG tablet, TAKE 1 TABLET TWICE DAILY WITH MEALS, Disp: 180 tablet, Rfl: 3    metoprolol succinate (TOPROL-XL) 25 MG 24 hr tablet, TAKE 1/2  TABLET EVERY DAY, Disp: 45 tablet, Rfl: 3    multivitamin (THERAGRAN) tablet, Take 1 tablet by mouth once daily., Disp: , Rfl:     nystatin (MYCOSTATIN) cream, Apply topically 2 (two) times daily., Disp: 30 g, Rfl: 4    nystatin (MYCOSTATIN) powder, APPLY  POWDER TOPICALLY 4 TIMES DAILY, Disp: 120 g, Rfl: 3    nystatin-triamcinolone (MYCOLOG II) cream, APPLY SPARINGLY TO AFFECTED AREA(S) 3 TIMES DAILY AS NEEDED, Disp: 30 g, Rfl: 1    polyethylene glycol (GLYCOLAX) 17 gram/dose powder, MIX 17 GRAMS IN LIQUID AND DRINK EVERY DAY AS NEEDED, Disp: 1530 g, Rfl: 11    spironolactone (ALDACTONE) 25 MG tablet, Take 1 tablet (25 mg total) by mouth once daily., Disp: 30 tablet, Rfl: 11    temazepam (RESTORIL) 30 mg capsule, TAKE 1 CAPSULE BY MOUTH AT NIGHT AS NEEDED., Disp: 30 capsule, Rfl: 1    trazodone (DESYREL) 150 MG tablet, Take 0.5 tablets (75 mg total) by mouth every evening., Disp: 90 tablet, Rfl: 3    TRUE METRIX GLUCOSE TEST STRIP Strp, TEST BLOOD SUGAR THREE TIMES DAILY, Disp: 300 strip, Rfl: 3    TRUEPLUS LANCETS 33 gauge Misc, USE THREE TIMES DAILY TO TEST BLOOD SUGAR, Disp: 300 each, Rfl: 3    REVIEW OF SYSTEMS:  GENERAL:  No progression of fatigue nor SOB  She is obese.  Difficulty   walking, extreme knee pain.  Gait instability only due to knees    HEENT:  No photophobia, rhinorrhea  RESPIRATORY:  ++ cough no wheezing.positive congestion  CARDIOVASCULAR:  No chest pain, palpitations  GASTROINTESTINAL:  No abdominal pain, dysphagia, emesis, diarrhea, or   constipation.    GENITOURINARY:  No urinary frequency, hesitancy  RHEUM:  ++  arthralgias or joint swelling.  MUSCOLSKELETAL:  No neck pain, back pain, positive  arthralgias  NEUROLOGICAL:  No headaches, positive dizziness, + paresthesias  PSYCH:  No agitation, change in behavior, or anxiety.  ENDOCRINE:  No hot or cold intolerance.    PHYSICAL EXAMINATION:  /61 (BP Location: Left arm, Patient Position: Sitting, BP Method: Large (Automatic))   " Pulse 90   Temp 97.8 °F (36.6 °C) (Oral)   Resp (!) 24   Ht 5' 8" (1.727 m)   Wt (!) 140.3 kg (309 lb 4.9 oz)   BMI 47.03 kg/m²     GENERAL:  She is obese.  She is oriented, well developed, well nourished.  PSYCH:  Pleasant affect.  No anxiety or depression.  HEENT:  Normocephalic.  Lids intact, conjunctivae pink.  Sinuses nontender to   palpation.  OP clear, no palatal pallor.  NECK:  Supple.  Trachea midline.  No palpable abnormalities.  CHEST:  No use of accessory muscles during respiration. Decreased Breath sounds bases  CV RRR  ABDOMEN:  NT, ND.  Normal bowel sounds.  No palpable HSM or mass.  EXTREMITIES:  Legs, 1+ pitting edema.  Evidence of chronic venous insufficiency stable  NEUROLOGICAL:  Patient is having trouble even ambulating today with her walker due to severe back pain.  She has decreased strength in her legs.  She is having a stop and catch her breath because of the pain.  She is on hydrocodone for pain and is asking if I can write her for something stronger  SKIN:  Warm, dry.  Ecchymosis evident.  No tenting, petechiae    LABS:      Lab Results   Component Value Date    WBC 11.13 03/12/2018    HGB 13.9 03/12/2018    HCT 46.2 03/12/2018    MCV 96 03/12/2018     03/12/2018     Iron and TIBC   Order: [id number]   Status:  Final result   Visible to patient:  Yes (Patient Portal) Next appt:  05/21/2018 at 05:20 PM in Family [facility] (Constantine Bird MD) Dx:  History of anemia     Ref Range & Units 2d ago 2mo ago    Iron 30 - 160 ug/dL 56  51     Transferrin 200 - 375 mg/dL 317  324     TIBC 250 - 450 ug/dL 469   480      Saturated Iron 20 - 50 % 12   11     Resulting Agency  [facility]      Specimen Collected: 03/12/18 14:04 Last Resulted: 03/13/18 01:02 Lab Flowsheet Order Details View Encounter Lab and Collection Details                 IMPRESSION AND PLAN:        Iron deficiency anemia, unspecified iron deficiency anemia type  -     CBC auto differential; Future; Expected date: " 03/14/2018  -     Iron and TIBC; Future; Expected date: 03/14/2018    Anxiety    Mixed restrictive and obstructive lung disease    History of anemia    Hepatomegaly     chronic joint and back pain but today it is affecting her ability to ambulate and her breathing.  I've given her a few numbers for pain management specialist in the [place] area.  Decrease salt intake  Increase mobility  To pain specialist to help manage her pain   Abstain from fatty foods to help with liver regeneration   RTC 3 months with iron levels and cbc   No need for IV iron at this time  Educated on vitamins to help with polyneuropathy  Currently counts stable  Cont lipitor to prevent plaques  Cont lasix to help with edema  Watch for peripheral destruction of cells with hepatomegaly    Current Outpatient Prescriptions:     albuterol (ACCUNEB) 0.63 mg/3 mL Nebu, , Disp: , Rfl:     albuterol (VENTOLIN HFA) 90 mcg/actuation inhaler, INHALE TWO PUFFS EVERY 6 HOURS AS NEEDED FOR WHEEZING, Disp: 18 g, Rfl: 0    albuterol-ipratropium 2.5mg-0.5mg/3mL (DUO-NEB) 0.5 mg-3 mg(2.5 mg base)/3 mL nebulizer solution, INHALE THE CONTENTS OF 1 VIAL VIA NEBULIZER EVERY 6 HOURS AS NEEDED FOR  WHEEZING (DO NOT USE WITH ALBUTEROL INHALER), Disp: 90 mL, Rfl: 4    apixaban 5 mg Tab, Take 1 tablet (5 mg total) by mouth 2 (two) times daily., Disp: 90 tablet, Rfl: 3    ascorbic acid, vitamin C, (VITAMIN C) 500 MG tablet, Take 1 tablet (500 mg total) by mouth every evening., Disp: , Rfl:     atorvastatin (LIPITOR) 20 MG tablet, TAKE 1 TABLET (20 MG TOTAL) BY MOUTH ONCE DAILY., Disp: 90 tablet, Rfl: 3    budesonide (PULMICORT) 0.5 mg/2 mL nebulizer solution, Take 3 mLs (0.75 mg total) by nebulization once daily., Disp: 2 mL, Rfl: 3    cephALEXin (KEFLEX) 500 MG capsule, TAKE 1 CAPSULE ONE TIME DAILY AT 6:00A.M., Disp: 90 capsule, Rfl: 1    clonazePAM (KLONOPIN) 1 MG tablet, Take 1 tablet (1 mg total) by mouth nightly as needed for Anxiety. Dose has to be taper  down., Disp: 20 tablet, Rfl: 1    doxycycline (MONODOX) 100 MG capsule, Take 1 capsule (100 mg total) by mouth 2 (two) times daily., Disp: 20 capsule, Rfl: 0    DULoxetine (CYMBALTA) 30 MG capsule, , Disp: , Rfl:     furosemide (LASIX) 40 MG tablet, TAKE ONE TABLET TWICE A DAYFOR FLUID OVERLOAD, Disp: 60 tablet, Rfl: 5    gabapentin (NEURONTIN) 800 MG tablet, Take 1 tablet (800 mg total) by mouth 3 (three) times daily., Disp: 270 tablet, Rfl: 3    hydrocodone-acetaminophen 10-325mg (NORCO)  mg Tab, Take 1 tablet by mouth every 8 (eight) hours as needed for Pain (Do not take more than 3 a day. Do not mix with other narcotics.)., Disp: 90 tablet, Rfl: 0    KLOR-CON M20 20 mEq tablet, TAKE ONE TABLET BY MOUTH ONCE DAILY, Disp: 30 tablet, Rfl: 6    levalbuterol (XOPENEX) 0.63 mg/3 mL nebulizer solution, INHALE THE CONTENTS OF 1 VIAL BY NEBULIZATION 4 times  DAILY.    DX: J43.9, Disp: 792 mL, Rfl: 3    lisinopril (PRINIVIL,ZESTRIL) 20 MG tablet, TAKE 1 TABLET EVERY DAY, Disp: 90 tablet, Rfl: 1    magnesium oxide (MAG-OX) 400 mg tablet, Take 1 tablet (400 mg total) by mouth 2 (two) times daily., Disp: , Rfl: 0    meloxicam (MOBIC) 7.5 MG tablet, TAKE 1 TABLET EVERY DAY, Disp: 90 tablet, Rfl: 1    metFORMIN (GLUCOPHAGE) 500 MG tablet, TAKE 1 TABLET TWICE DAILY WITH MEALS, Disp: 180 tablet, Rfl: 3    metoprolol succinate (TOPROL-XL) 25 MG 24 hr tablet, TAKE 1/2 TABLET EVERY DAY, Disp: 45 tablet, Rfl: 3    multivitamin (THERAGRAN) tablet, Take 1 tablet by mouth once daily., Disp: , Rfl:     nystatin (MYCOSTATIN) cream, Apply topically 2 (two) times daily., Disp: 30 g, Rfl: 4    nystatin (MYCOSTATIN) powder, APPLY  POWDER TOPICALLY 4 TIMES DAILY, Disp: 120 g, Rfl: 3    nystatin-triamcinolone (MYCOLOG II) cream, APPLY SPARINGLY TO AFFECTED AREA(S) 3 TIMES DAILY AS NEEDED, Disp: 30 g, Rfl: 1    polyethylene glycol (GLYCOLAX) 17 gram/dose powder, MIX 17 GRAMS IN LIQUID AND DRINK EVERY DAY AS NEEDED, Disp:  1530 g, Rfl: 11    spironolactone (ALDACTONE) 25 MG tablet, Take 1 tablet (25 mg total) by mouth once daily., Disp: 30 tablet, Rfl: 11    temazepam (RESTORIL) 30 mg capsule, TAKE 1 CAPSULE BY MOUTH AT NIGHT AS NEEDED., Disp: 30 capsule, Rfl: 1    trazodone (DESYREL) 150 MG tablet, Take 0.5 tablets (75 mg total) by mouth every evening., Disp: 90 tablet, Rfl: 3    TRUE METRIX GLUCOSE TEST STRIP Strp, TEST BLOOD SUGAR THREE TIMES DAILY, Disp: 300 strip, Rfl: 3    TRUEPLUS LANCETS 33 gauge Misc, USE THREE TIMES DAILY TO TEST BLOOD SUGAR, Disp: 300 each, Rfl: 3      She is to continue Coumadin for chronic atrial fibrillation as well as her diabetic medication to help control hyperglycemia due to diabetes

## 2018-03-21 RX ORDER — NYSTATIN AND TRIAMCINOLONE ACETONIDE 100000; 1 [USP'U]/G; MG/G
CREAM TOPICAL
Refills: 1
Start: 2018-03-21

## 2018-03-22 ENCOUNTER — OUTPATIENT CASE MANAGEMENT (OUTPATIENT)
Dept: ADMINISTRATIVE | Facility: OTHER | Age: 68
End: 2018-03-22

## 2018-03-22 NOTE — PROGRESS NOTES
3/22/2018  Patient states she is not currently available to complete follow up call.  Requesting call back.  JUANITA Zhu      3/22/2018  [id number]  1st Attempt to complete follow-up for Outpatient Care Management; left message requesting return call.  JUANITA Zhu

## 2018-03-23 RX ORDER — NYSTATIN 100000 U/G
CREAM TOPICAL 2 TIMES DAILY
Qty: 30 G | Refills: 4 | Status: CANCELLED | OUTPATIENT
Start: 2018-03-23

## 2018-03-23 RX ORDER — NYSTATIN AND TRIAMCINOLONE ACETONIDE 100000; 1 [USP'U]/G; MG/G
CREAM TOPICAL
Qty: 30 G | Refills: 1 | Status: CANCELLED | OUTPATIENT
Start: 2018-03-23

## 2018-03-23 RX ORDER — NYSTATIN AND TRIAMCINOLONE ACETONIDE 100000; 1 [USP'U]/G; MG/G
1 CREAM TOPICAL 3 TIMES DAILY
Qty: 42 G | Refills: 1 | Status: SHIPPED | OUTPATIENT
Start: 2018-03-23 | End: 2018-03-30 | Stop reason: SDUPTHER

## 2018-03-26 ENCOUNTER — HOSPITAL ENCOUNTER (OUTPATIENT)
Dept: RADIOLOGY | Facility: CLINIC | Age: 68
Discharge: HOME | End: 2018-03-26
Attending: PODIATRIST
Payer: MEDICARE

## 2018-03-26 ENCOUNTER — OFFICE VISIT (OUTPATIENT)
Dept: PODIATRY | Facility: CLINIC | Age: 68
End: 2018-03-26
Payer: MEDICARE

## 2018-03-26 VITALS — BODY MASS INDEX: 44.41 KG/M2 | WEIGHT: 293 LBS | HEIGHT: 68 IN

## 2018-03-26 DIAGNOSIS — I73.9 PAD (PERIPHERAL ARTERY DISEASE): ICD-10-CM

## 2018-03-26 DIAGNOSIS — E11.42 DIABETIC POLYNEUROPATHY ASSOCIATED WITH TYPE 2 DIABETES MELLITUS: ICD-10-CM

## 2018-03-26 DIAGNOSIS — L97.519 ULCER OF RIGHT FOOT, UNSPECIFIED ULCER STAGE: ICD-10-CM

## 2018-03-26 DIAGNOSIS — L97.519 ULCER OF RIGHT FOOT, UNSPECIFIED ULCER STAGE: Primary | ICD-10-CM

## 2018-03-26 PROCEDURE — 99999 PR PBB SHADOW E&M-EST. PATIENT-LVL III: CPT | Mod: PBBFAC,,, | Performed by: PODIATRIST

## 2018-03-26 PROCEDURE — 73630 X-RAY EXAM OF FOOT: CPT | Mod: 26,RT,S$GLB, | Performed by: RADIOLOGY

## 2018-03-26 PROCEDURE — 99499 UNLISTED E&M SERVICE: CPT | Mod: S$GLB,,, | Performed by: PODIATRIST

## 2018-03-26 PROCEDURE — 73630 X-RAY EXAM OF FOOT: CPT | Mod: TC,FY,PO,RT

## 2018-03-26 PROCEDURE — 3044F HG A1C LEVEL LT 7.0%: CPT | Mod: CPTII,S$GLB,, | Performed by: PODIATRIST

## 2018-03-26 PROCEDURE — 99213 OFFICE O/P EST LOW 20 MIN: CPT | Mod: S$GLB,,, | Performed by: PODIATRIST

## 2018-03-26 NOTE — PROGRESS NOTES
Subjective:      Patient ID: Nelly Tian is a 67 y.o. female.    Chief Complaint: Foot Pain (right)    Sore and redness right foot.  rapid onset dropping something on it, not improving over past 2 weeks, aggravated by increased weight bearing, shoe gear, pressure.  No previous medical treatment.  OTC pain med not helping.  Denies surgery right foot.    Review of Systems   Constitution: Negative for chills, diaphoresis, fever, malaise/fatigue and night sweats.   Cardiovascular: Negative for claudication, cyanosis, leg swelling and syncope.   Skin: Positive for poor wound healing. Negative for color change, dry skin, nail changes, rash, suspicious lesions and unusual hair distribution.   Musculoskeletal: Negative for falls, joint pain, joint swelling, muscle cramps, muscle weakness and stiffness.   Gastrointestinal: Negative for constipation, diarrhea, nausea and vomiting.   Neurological: Positive for sensory change. Negative for brief paralysis, disturbances in coordination, focal weakness, numbness, paresthesias and tremors.           Objective:      Physical Exam   Constitutional: She is oriented to person, place, and time. She appears well-developed and well-nourished. She is cooperative.   Oriented to time, place, and person.   Cardiovascular:   Pulses:       Dorsalis pedis pulses are 1+ on the right side, and 1+ on the left side.        Posterior tibial pulses are 1+ on the right side, and 1+ on the left side.   Capillary fill time 3-5 seconds.  All toes warm to touch.      Negative lower extremity edema bilateral.    Negative elevational pallor and dependent rubor bilateral.     Musculoskeletal:   Normal angle, base, station of gait. Decreased stride length, early heel off, moderately propulsive toe off bilateral.    All ten toes without clubbing, cyanosis, or signs of ischemia.      No pain to palpation bilateral lower extremities.      Range of motion, stability, muscle strength, and muscle tone are age  and health appropriate normal bilateral feet and legs.       Lymphadenopathy:   Negative lymphadenopathy bilateral popliteal fossa and tarsal tunnel.  Negative lymphangitic streaking bilateral foot/ankle bilateral.     Neurological: She is alert and oriented to person, place, and time. She has normal strength. She is not disoriented. She displays no atrophy and no tremor. A sensory deficit is present. She exhibits normal muscle tone.   Reflex Scores:       Patellar reflexes are 2+ on the right side and 2+ on the left side.       Achilles reflexes are 2+ on the right side and 2+ on the left side.  Decreased/absent vibratory sensation bilateral feet to 128Hz tuning fork.  Diminished/loss of protective sensation all toes bilateral to 10 gram monofilament.     Skin: Skin is warm, dry and intact. No abrasion, no bruising, no burn, no ecchymosis, no laceration, no lesion, no petechiae and no rash noted. She is not diaphoretic. No cyanosis or erythema. No pallor. Nails show no clubbing.   Skin thin, atrophic, with decreased density and distribution of pedal hair bilateral, but without hyperpigmentation, rhianna discoloration,  ulcers, masses, nodules or cords palpated bilateral feet and legs.      Wound:  Dorsal 1st interdigital/intermetatarsal space right    Size:    Pre:[id number]      Base:  Stable dark eschar without drainage.  No pus, tracking, fluctuance, malodor, or cardinal signs infection.    Borders:  Atrophic, flat, pink, blanchable skin edges without undermining.               Assessment:       Encounter Diagnoses   Name Primary?    Ulcer of right foot, unspecified ulcer stage Yes    Diabetic polyneuropathy associated with type 2 diabetes mellitus     PAD (peripheral artery disease)          Plan:       Nelly was seen today for foot pain.    Diagnoses and all orders for this visit:    Ulcer of right foot, unspecified ulcer stage  -     X-Ray Foot Complete Right; Future  -     US Lower Extrem Arteries Bilat  with JOSEE; Future    Diabetic polyneuropathy associated with type 2 diabetes mellitus  -     X-Ray Foot Complete Right; Future  -     US Lower Extrem Arteries Bilat with JOSEE; Future    PAD (peripheral artery disease)  -     X-Ray Foot Complete Right; Future  -     US Lower Extrem Arteries Bilat with JOSEE; Future      I counseled the patient on her conditions, their implications and medical management.    Swab wound right daily with betadine.  Cover same with border gauze.    Dispense sx shoe right - ambulate to  Tolerance.    Adequate vitamin supplementation, protein intake, and hydration - discussed with patient.    Xrays, arterial dopplers.    Follow-up in about 1 week (around 4/2/2018).

## 2018-03-28 ENCOUNTER — TELEPHONE (OUTPATIENT)
Dept: FAMILY MEDICINE | Facility: CLINIC | Age: 68
End: 2018-03-28

## 2018-03-28 ENCOUNTER — LAB VISIT (OUTPATIENT)
Dept: LAB | Facility: HOSPITAL | Age: 68
End: 2018-03-28
Attending: NURSE PRACTITIONER
Payer: MEDICARE

## 2018-03-28 ENCOUNTER — TELEPHONE (OUTPATIENT)
Dept: BARIATRICS | Facility: CLINIC | Age: 68
End: 2018-03-28

## 2018-03-28 ENCOUNTER — HOSPITAL ENCOUNTER (OUTPATIENT)
Dept: RADIOLOGY | Facility: HOSPITAL | Age: 68
Discharge: HOME | End: 2018-03-28
Attending: PODIATRIST
Payer: MEDICARE

## 2018-03-28 ENCOUNTER — OFFICE VISIT (OUTPATIENT)
Dept: FAMILY MEDICINE | Facility: CLINIC | Age: 68
End: 2018-03-28
Payer: MEDICARE

## 2018-03-28 VITALS — BODY MASS INDEX: 44.41 KG/M2 | WEIGHT: 293 LBS | HEIGHT: 68 IN | OXYGEN SATURATION: 92 % | TEMPERATURE: 98 F

## 2018-03-28 DIAGNOSIS — I73.9 PAD (PERIPHERAL ARTERY DISEASE): ICD-10-CM

## 2018-03-28 DIAGNOSIS — S31.109A WOUND OF ABDOMEN: ICD-10-CM

## 2018-03-28 DIAGNOSIS — E11.8 TYPE 2 DIABETES MELLITUS WITH COMPLICATION, WITHOUT LONG-TERM CURRENT USE OF INSULIN: ICD-10-CM

## 2018-03-28 DIAGNOSIS — S31.109A WOUND OF ABDOMEN: Primary | ICD-10-CM

## 2018-03-28 DIAGNOSIS — L97.519 ULCER OF RIGHT FOOT, UNSPECIFIED ULCER STAGE: ICD-10-CM

## 2018-03-28 DIAGNOSIS — E66.01 MORBID OBESITY WITH BMI OF 40.0-44.9, ADULT: ICD-10-CM

## 2018-03-28 DIAGNOSIS — K43.9 HERNIA OF ABDOMINAL WALL: ICD-10-CM

## 2018-03-28 DIAGNOSIS — E11.42 DIABETIC POLYNEUROPATHY ASSOCIATED WITH TYPE 2 DIABETES MELLITUS: ICD-10-CM

## 2018-03-28 DIAGNOSIS — Z72.0 TOBACCO USE: ICD-10-CM

## 2018-03-28 LAB
BASOPHILS # BLD AUTO: 0.05 K/UL
BASOPHILS NFR BLD: 0.4 %
DIFFERENTIAL METHOD: ABNORMAL
EOSINOPHIL # BLD AUTO: 0.2 K/UL
EOSINOPHIL NFR BLD: 1.3 %
ERYTHROCYTE [DISTWIDTH] IN BLOOD BY AUTOMATED COUNT: 14.6 %
HCT VFR BLD AUTO: 44.7 %
HGB BLD-MCNC: 13.1 G/DL
IMM GRANULOCYTES # BLD AUTO: 0.04 K/UL
IMM GRANULOCYTES NFR BLD AUTO: 0.3 %
LYMPHOCYTES # BLD AUTO: 2 K/UL
LYMPHOCYTES NFR BLD: 17.4 %
MCH RBC QN AUTO: 28.6 PG
MCHC RBC AUTO-ENTMCNC: 29.3 G/DL
MCV RBC AUTO: 98 FL
MONOCYTES # BLD AUTO: 1 K/UL
MONOCYTES NFR BLD: 9.1 %
NEUTROPHILS # BLD AUTO: 8.2 K/UL
NEUTROPHILS NFR BLD: 71.5 %
NRBC BLD-RTO: 0 /100 WBC
PLATELET # BLD AUTO: 264 K/UL
PMV BLD AUTO: 11.1 FL
RBC # BLD AUTO: 4.58 M/UL
WBC # BLD AUTO: 11.45 K/UL

## 2018-03-28 PROCEDURE — 99214 OFFICE O/P EST MOD 30 MIN: CPT | Mod: S$GLB,,, | Performed by: NURSE PRACTITIONER

## 2018-03-28 PROCEDURE — 93925 LOWER EXTREMITY STUDY: CPT | Mod: 26,,, | Performed by: RADIOLOGY

## 2018-03-28 PROCEDURE — 85025 COMPLETE CBC W/AUTO DIFF WBC: CPT

## 2018-03-28 PROCEDURE — 93922 UPR/L XTREMITY ART 2 LEVELS: CPT | Mod: TC

## 2018-03-28 PROCEDURE — 36415 COLL VENOUS BLD VENIPUNCTURE: CPT | Mod: PO

## 2018-03-28 PROCEDURE — 93922 UPR/L XTREMITY ART 2 LEVELS: CPT | Mod: 26,,, | Performed by: RADIOLOGY

## 2018-03-28 PROCEDURE — 3044F HG A1C LEVEL LT 7.0%: CPT | Mod: CPTII,S$GLB,, | Performed by: NURSE PRACTITIONER

## 2018-03-28 PROCEDURE — 99499 UNLISTED E&M SERVICE: CPT | Mod: S$GLB,,, | Performed by: NURSE PRACTITIONER

## 2018-03-28 PROCEDURE — 99999 PR PBB SHADOW E&M-EST. PATIENT-LVL V: CPT | Mod: PBBFAC,,, | Performed by: NURSE PRACTITIONER

## 2018-03-28 RX ORDER — CLINDAMYCIN HYDROCHLORIDE 300 MG/1
300 CAPSULE ORAL 3 TIMES DAILY
Qty: 30 CAPSULE | Refills: 0 | Status: SHIPPED | OUTPATIENT
Start: 2018-03-28 | End: 2018-04-03 | Stop reason: ALTCHOICE

## 2018-03-28 RX ORDER — IBUPROFEN 200 MG
1 TABLET ORAL DAILY
Qty: 30 PATCH | Refills: 1 | Status: SHIPPED | OUTPATIENT
Start: 2018-03-28 | End: 2018-05-09

## 2018-03-28 NOTE — TELEPHONE ENCOUNTER
----- Message from Lisha Lino sent at 3/28/2018 11:14 AM CDT -----  Pt saw dr nugent in the hospital over a year ago and was told he would be able to take care of the Hernia of abdominal wall and now she is ready to get this done, will dr nugent still be able to do this ?  Please call her @ [phone]  Thanks !

## 2018-03-28 NOTE — TELEPHONE ENCOUNTER
----- Message from Shaista King sent at 3/28/2018 11:28 AM CDT -----  [facility]  Calling //[phone] yeny //concerning  Lidocaine  Gel //// please call

## 2018-03-28 NOTE — TELEPHONE ENCOUNTER
Per pharmacy, Lidocaine does not come in a gel. Only comes as a 1% solution or 4% cream. Per Ms Garcia, change to lidocaine 4% cream with same directions #120 Gm RF 2.  Pharmacy informed.

## 2018-03-28 NOTE — PROGRESS NOTES
Subjective:       Patient ID: Nelly Tian is a 67 y.o. female.    Chief Complaint: Fatigue (hernia, not able to keep food down)    Ms. Tian presents to the clinic today for recurrent wound to left lower abdomen in area of hernia.  She has been feeling fatigued and sometimes jittery.  She is not checking blood sugars and is not eating as much as usual.        Review of Systems   Constitutional: Negative for chills and fever.   HENT: Negative for congestion, ear pain and sinus pressure.    Respiratory: Negative for cough and shortness of breath.    Cardiovascular: Negative for chest pain, palpitations and leg swelling.   Gastrointestinal: Negative for abdominal pain, constipation and diarrhea.   Skin: Positive for wound.   Psychiatric/Behavioral: Positive for dysphoric mood.        Jittery at times       Objective:      Physical Exam   Abdominal: A hernia is present. Hernia confirmed positive in the ventral area.   Obese abdomen   Skin:                Current Outpatient Prescriptions:     albuterol (ACCUNEB) 0.63 mg/3 mL Nebu, , Disp: , Rfl:     albuterol (VENTOLIN HFA) 90 mcg/actuation inhaler, INHALE TWO PUFFS EVERY 6 HOURS AS NEEDED FOR WHEEZING, Disp: 18 g, Rfl: 0    albuterol-ipratropium 2.5mg-0.5mg/3mL (DUO-NEB) 0.5 mg-3 mg(2.5 mg base)/3 mL nebulizer solution, INHALE THE CONTENTS OF 1 VIAL VIA NEBULIZER EVERY 6 HOURS AS NEEDED FOR  WHEEZING (DO NOT USE WITH ALBUTEROL INHALER), Disp: 90 mL, Rfl: 4    apixaban 5 mg Tab, Take 1 tablet (5 mg total) by mouth 2 (two) times daily., Disp: 90 tablet, Rfl: 3    ascorbic acid, vitamin C, (VITAMIN C) 500 MG tablet, Take 1 tablet (500 mg total) by mouth every evening., Disp: , Rfl:     atorvastatin (LIPITOR) 20 MG tablet, TAKE 1 TABLET (20 MG TOTAL) BY MOUTH ONCE DAILY., Disp: 90 tablet, Rfl: 3    budesonide (PULMICORT) 0.5 mg/2 mL nebulizer solution, Take 3 mLs (0.75 mg total) by nebulization once daily., Disp: 2 mL, Rfl: 3    cephALEXin (KEFLEX) 500 MG  capsule, TAKE 1 CAPSULE ONE TIME DAILY AT 6:00A.M., Disp: 90 capsule, Rfl: 1    clindamycin (CLEOCIN) 300 MG capsule, Take 1 capsule (300 mg total) by mouth 3 (three) times daily., Disp: 30 capsule, Rfl: 0    clonazePAM (KLONOPIN) 1 MG tablet, Take 1 tablet (1 mg total) by mouth nightly as needed for Anxiety. Dose has to be taper down., Disp: 20 tablet, Rfl: 1    collagenase (SANTYL) ointment, Apply topically once daily., Disp: 30 g, Rfl: 0    doxycycline (MONODOX) 100 MG capsule, Take 1 capsule (100 mg total) by mouth 2 (two) times daily., Disp: 20 capsule, Rfl: 0    DULoxetine (CYMBALTA) 30 MG capsule, , Disp: , Rfl:     furosemide (LASIX) 40 MG tablet, TAKE ONE TABLET TWICE A DAYFOR FLUID OVERLOAD, Disp: 60 tablet, Rfl: 5    gabapentin (NEURONTIN) 600 MG tablet, , Disp: , Rfl:     gabapentin (NEURONTIN) 800 MG tablet, Take 1 tablet (800 mg total) by mouth 3 (three) times daily., Disp: 270 tablet, Rfl: 3    hydrocodone-acetaminophen 10-325mg (NORCO)  mg Tab, Take 1 tablet by mouth every 8 (eight) hours as needed for Pain (Do not take more than 3 a day. Do not mix with other narcotics.)., Disp: 90 tablet, Rfl: 0    KLOR-CON M20 20 mEq tablet, TAKE ONE TABLET BY MOUTH ONCE DAILY, Disp: 30 tablet, Rfl: 6    levalbuterol (XOPENEX) 0.63 mg/3 mL nebulizer solution, INHALE THE CONTENTS OF 1 VIAL BY NEBULIZATION 4 times  DAILY.    DX: J43.9, Disp: 792 mL, Rfl: 3    lidocaine 4 % Gel, Apply 1 application topically once daily., Disp: 113 g, Rfl: 2    lisinopril (PRINIVIL,ZESTRIL) 20 MG tablet, TAKE 1 TABLET EVERY DAY, Disp: 90 tablet, Rfl: 1    magnesium oxide (MAG-OX) 400 mg tablet, Take 1 tablet (400 mg total) by mouth 2 (two) times daily., Disp: , Rfl: 0    meloxicam (MOBIC) 7.5 MG tablet, TAKE 1 TABLET EVERY DAY, Disp: 90 tablet, Rfl: 1    metFORMIN (GLUCOPHAGE) 500 MG tablet, TAKE 1 TABLET TWICE DAILY WITH MEALS, Disp: 180 tablet, Rfl: 3    metoprolol succinate (TOPROL-XL) 25 MG 24 hr tablet,  TAKE 1/2 TABLET EVERY DAY, Disp: 45 tablet, Rfl: 3    multivitamin (THERAGRAN) tablet, Take 1 tablet by mouth once daily., Disp: , Rfl:     nicotine (NICODERM CQ) 21 mg/24 hr, Place 1 patch onto the skin once daily., Disp: 30 patch, Rfl: 1    nystatin (MYCOSTATIN) cream, Apply topically 2 (two) times daily., Disp: 30 g, Rfl: 4    nystatin (MYCOSTATIN) powder, APPLY  POWDER TOPICALLY 4 TIMES DAILY, Disp: 120 g, Rfl: 3    nystatin-triamcinolone (MYCOLOG II) cream, Apply 1 g topically 3 (three) times daily., Disp: 42 g, Rfl: 1    polyethylene glycol (GLYCOLAX) 17 gram/dose powder, MIX 17 GRAMS IN LIQUID AND DRINK EVERY DAY AS NEEDED, Disp: 1530 g, Rfl: 11    spironolactone (ALDACTONE) 25 MG tablet, Take 1 tablet (25 mg total) by mouth once daily., Disp: 30 tablet, Rfl: 11    temazepam (RESTORIL) 30 mg capsule, TAKE 1 CAPSULE BY MOUTH AT NIGHT AS NEEDED., Disp: 30 capsule, Rfl: 1    trazodone (DESYREL) 150 MG tablet, Take 0.5 tablets (75 mg total) by mouth every evening., Disp: 90 tablet, Rfl: 3    TRUE METRIX GLUCOSE TEST STRIP Strp, TEST BLOOD SUGAR THREE TIMES DAILY, Disp: 300 strip, Rfl: 3    TRUEPLUS LANCETS 33 gauge Misc, USE THREE TIMES DAILY TO TEST BLOOD SUGAR, Disp: 300 each, Rfl: 3  Assessment:       1. Wound of abdomen    2. Hernia of abdominal wall    3. Tobacco use    4. Type 2 diabetes mellitus with complication, without long-term current use of insulin    5. Morbid obesity with BMI of 40.0-44.9, adult        Plan:       Wound of abdomen  Likely due to hernia.  RTC one week.  -     Ambulatory referral to Wound Clinic  -     CBC auto differential; Future; Expected date: 03/28/2018  -     collagenase (SANTYL) ointment; Apply topically once daily.  Dispense: 30 g; Refill: 0  -     clindamycin (CLEOCIN) 300 MG capsule; Take 1 capsule (300 mg total) by mouth 3 (three) times daily.  Dispense: 30 capsule; Refill: 0  -     lidocaine 4 % Gel; Apply 1 application topically once daily.  Dispense: 113 g;  Refill: 2  -     Ambulatory referral to Home Health    Hernia of abdominal wall  -     Ambulatory referral to General Surgery    Tobacco use  Advised quitting smoking will help wound healing.  -     nicotine (NICODERM CQ) 21 mg/24 hr; Place 1 patch onto the skin once daily.  Dispense: 30 patch; Refill: 1    Type 2 diabetes mellitus with complication, without long-term current use of insulin  Advised to closely monitor blood sugars. Notify if abnormal.    Morbid obesity  Patient readiness: acceptance and barriers:readiness    During the course of the visit the patient was educated and counseled about the following:     Diabetes:  Discussed general issues about diabetes pathophysiology and management.  Obesity:   General weight loss/lifestyle modification strategies discussed (elicit support from others; identify saboteurs; non-food rewards, etc).    Goals: Diabetes: Maintain Hemoglobin A1C below 7 and Obesity: Reduce calorie intake and BMI    Did patient meet goals/outcomes: No    The following self management tools provided: declined    Patient Instructions (the written plan) was given to the patient/family.     Time spent with patient: 30 minutes    Barriers to medications present (no )    Adverse reactions to current medications (no)    Over the counter medications reviewed (No)

## 2018-03-29 ENCOUNTER — OUTPATIENT CASE MANAGEMENT (OUTPATIENT)
Dept: ADMINISTRATIVE | Facility: OTHER | Age: 68
End: 2018-03-29

## 2018-03-29 DIAGNOSIS — E11.9 TYPE 2 DIABETES MELLITUS WITHOUT COMPLICATION: ICD-10-CM

## 2018-03-29 NOTE — PROGRESS NOTES
03/29/2018  Summary:  Follow up completed with patient this afternoon.  Patient was seen in the clinic on yesterday for an ulcer on her abdomen.  Review of provider notes.  Referral to [facility] noted, patient reports waiting on the call for the first visit.  Encouraged patient to call me if she doesn't hear from them this weekend.  Patient verbalized understanding.  Reviewed with patient S&S of infection to watch for, patient verbalized understanding.  Reviewed EMR noted foot ulcer care, encouraged Swab wound right daily with betadine.  Cover same with border gauze - as per provider notes.  Patient reports feeling good today.  Patient reports no new concerns/complaints.  Agreeable to follow up call in couple weeks. Encouraged her to call with any needs.      Interventions: wound care education reviewed, encouraged daily wound care, encouraged appointment compliance, review home health role, mail smoking cessation information.    Plan: COPD education, [facility] on call number review, medication compliance, encourage smoking cessation program, collaboration with home health.  JUANITA Zhu

## 2018-03-30 RX ORDER — TRAZODONE HYDROCHLORIDE 150 MG/1
TABLET ORAL
Qty: 90 TABLET | Refills: 3 | Status: SHIPPED | OUTPATIENT
Start: 2018-03-30 | End: 2018-10-31 | Stop reason: SDUPTHER

## 2018-03-30 RX ORDER — TEMAZEPAM 30 MG/1
CAPSULE ORAL
Qty: 30 CAPSULE | Refills: 1 | OUTPATIENT
Start: 2018-03-30

## 2018-03-30 NOTE — TELEPHONE ENCOUNTER
The Desyrel/Trazodone was refilled but the Restoril was denied. She has only one rx in Nov and should not be use with the rest of her medications.

## 2018-04-02 ENCOUNTER — TELEPHONE (OUTPATIENT)
Dept: FAMILY MEDICINE | Facility: CLINIC | Age: 68
End: 2018-04-02

## 2018-04-02 RX ORDER — NYSTATIN AND TRIAMCINOLONE ACETONIDE 100000; 1 [USP'U]/G; MG/G
CREAM TOPICAL
Qty: 30 G | Refills: 1 | Status: SHIPPED | OUTPATIENT
Start: 2018-04-02 | End: 2018-06-23 | Stop reason: SDUPTHER

## 2018-04-02 RX ORDER — ALBUTEROL SULFATE 90 UG/1
AEROSOL, METERED RESPIRATORY (INHALATION)
Qty: 18 G | Refills: 0 | Status: SHIPPED | OUTPATIENT
Start: 2018-04-02 | End: 2018-04-09 | Stop reason: SDUPTHER

## 2018-04-02 RX ORDER — POLYETHYLENE GLYCOL 3350 17 G/17G
POWDER, FOR SOLUTION ORAL
Qty: 1530 G | Refills: 0 | Status: SHIPPED | OUTPATIENT
Start: 2018-04-02 | End: 2018-04-04 | Stop reason: SDUPTHER

## 2018-04-02 NOTE — TELEPHONE ENCOUNTER
The foot wound is being treated by Dr. Meng and he did order betadine swabs daily, cover with bordered gauze.      For the abdominal wound please apply Santyl daily and cover with mepilex.  Was she able to get an appointment with the Wound Care center?  I referred her at last visit.

## 2018-04-02 NOTE — TELEPHONE ENCOUNTER
----- Message from Shira Singh LPN sent at 4/2/2018 11:15 AM CDT -----  Contact: Gene with [facility]      ----- Message -----  From: Rosa Isela Swain  Sent: 4/2/2018   8:20 AM  To: Pelon Fitch Staff    Gene with [facility], [phone] is calling to verify would care.  Please call.  Thanks!

## 2018-04-02 NOTE — TELEPHONE ENCOUNTER
Left voice message for [facility] with [facility] to call back. to call back.

## 2018-04-02 NOTE — TELEPHONE ENCOUNTER
Spoke to Konrad GRANT with [facility] ([phone]). Clarified wound care orders for abdominal wound.   Patient also has an ulcer on her foot that she is cleaning with hydrogen peroxide and betadine and dcovering withj a bandaid. Needs wound care orders for this wound as well.

## 2018-04-03 ENCOUNTER — PATIENT MESSAGE (OUTPATIENT)
Dept: FAMILY MEDICINE | Facility: CLINIC | Age: 68
End: 2018-04-03

## 2018-04-03 DIAGNOSIS — L03.90 CELLULITIS, UNSPECIFIED CELLULITIS SITE: Primary | ICD-10-CM

## 2018-04-03 RX ORDER — CEPHALEXIN 500 MG/1
500 CAPSULE ORAL EVERY 6 HOURS
Qty: 40 CAPSULE | Refills: 0 | Status: SHIPPED | OUTPATIENT
Start: 2018-04-03 | End: 2018-04-13 | Stop reason: SDUPTHER

## 2018-04-03 NOTE — TELEPHONE ENCOUNTER
Patient states that she cannot take the clindamycin that was prescribed. Causes nausea and vomiting. Reports that last time she had a wound like this one she was treated with cephalexin and did not have any problem with it.   Requesting medication change. Please advise.

## 2018-04-03 NOTE — TELEPHONE ENCOUNTER
Left detailed voice message for Gene with [facility] regarding wound care. Patient is scheduled to be seen at the wound care clinic on 4/5/18.

## 2018-04-04 RX ORDER — POLYETHYLENE GLYCOL 3350 17 G/17G
17 POWDER, FOR SOLUTION ORAL DAILY
Qty: 1530 G | Refills: 3 | Status: SHIPPED | OUTPATIENT
Start: 2018-04-04 | End: 2018-04-09 | Stop reason: SDUPTHER

## 2018-04-05 DIAGNOSIS — M43.12 SPONDYLOLISTHESIS OF CERVICAL REGION: ICD-10-CM

## 2018-04-05 DIAGNOSIS — M51.36 LUMBAR DEGENERATIVE DISC DISEASE: ICD-10-CM

## 2018-04-05 NOTE — TELEPHONE ENCOUNTER
Patient requesting a refill of Hydrocodone.  LR--3-6-18  LOV--3-28-18  FOV--4-6-18  Urine Toxicology--10-25-17  Pain Contract--10-25-17

## 2018-04-06 RX ORDER — HYDROCODONE BITARTRATE AND ACETAMINOPHEN 10; 325 MG/1; MG/1
1 TABLET ORAL EVERY 8 HOURS PRN
Qty: 90 TABLET | Refills: 0 | Status: SHIPPED | OUTPATIENT
Start: 2018-04-06 | End: 2018-05-04 | Stop reason: SDUPTHER

## 2018-04-09 RX ORDER — POLYETHYLENE GLYCOL 3350 17 G/17G
17 POWDER, FOR SOLUTION ORAL DAILY
Qty: 1530 G | Refills: 3 | Status: SHIPPED | OUTPATIENT
Start: 2018-04-09 | End: 2018-04-29 | Stop reason: SDUPTHER

## 2018-04-10 RX ORDER — ALBUTEROL SULFATE 90 UG/1
2 AEROSOL, METERED RESPIRATORY (INHALATION) EVERY 6 HOURS PRN
Qty: 18 G | Refills: 0 | Status: SHIPPED | OUTPATIENT
Start: 2018-04-10 | End: 2018-04-23 | Stop reason: SDUPTHER

## 2018-04-12 ENCOUNTER — OUTPATIENT CASE MANAGEMENT (OUTPATIENT)
Dept: ADMINISTRATIVE | Facility: OTHER | Age: 68
End: 2018-04-12

## 2018-04-12 NOTE — PROGRESS NOTES
4/12/2018  1st Attempt to complete follow-up for Outpatient Care Management; left message requesting return call.  JUANITA Zhu

## 2018-04-13 ENCOUNTER — OFFICE VISIT (OUTPATIENT)
Dept: FAMILY MEDICINE | Facility: CLINIC | Age: 68
End: 2018-04-13
Payer: MEDICARE

## 2018-04-13 ENCOUNTER — OFFICE VISIT (OUTPATIENT)
Dept: PODIATRY | Facility: CLINIC | Age: 68
End: 2018-04-13
Payer: MEDICARE

## 2018-04-13 VITALS
HEIGHT: 68 IN | DIASTOLIC BLOOD PRESSURE: 69 MMHG | WEIGHT: 293 LBS | HEART RATE: 96 BPM | TEMPERATURE: 98 F | SYSTOLIC BLOOD PRESSURE: 115 MMHG | HEIGHT: 68 IN | WEIGHT: 289.25 LBS | BODY MASS INDEX: 43.84 KG/M2 | BODY MASS INDEX: 44.41 KG/M2

## 2018-04-13 DIAGNOSIS — I73.9 PAD (PERIPHERAL ARTERY DISEASE): ICD-10-CM

## 2018-04-13 DIAGNOSIS — E11.42 DIABETIC POLYNEUROPATHY ASSOCIATED WITH TYPE 2 DIABETES MELLITUS: ICD-10-CM

## 2018-04-13 DIAGNOSIS — I15.2 HYPERTENSION ASSOCIATED WITH DIABETES: ICD-10-CM

## 2018-04-13 DIAGNOSIS — Z87.898 HISTORY OF ULCERATION: Primary | ICD-10-CM

## 2018-04-13 DIAGNOSIS — E11.59 HYPERTENSION ASSOCIATED WITH DIABETES: ICD-10-CM

## 2018-04-13 DIAGNOSIS — L08.9 WOUND INFECTION: Primary | ICD-10-CM

## 2018-04-13 DIAGNOSIS — E11.610 DIABETIC NEUROGENIC ARTHROPATHY: ICD-10-CM

## 2018-04-13 DIAGNOSIS — E66.01 MORBID OBESITY WITH BMI OF 40.0-44.9, ADULT: ICD-10-CM

## 2018-04-13 DIAGNOSIS — T14.8XXA WOUND INFECTION: Primary | ICD-10-CM

## 2018-04-13 DIAGNOSIS — F33.9 MAJOR DEPRESSION, RECURRENT, CHRONIC: ICD-10-CM

## 2018-04-13 DIAGNOSIS — E11.8 TYPE 2 DIABETES MELLITUS WITH COMPLICATION, WITHOUT LONG-TERM CURRENT USE OF INSULIN: ICD-10-CM

## 2018-04-13 DIAGNOSIS — F41.9 ANXIETY: ICD-10-CM

## 2018-04-13 DIAGNOSIS — R26.81 UNSTEADY GAIT: ICD-10-CM

## 2018-04-13 PROCEDURE — 3074F SYST BP LT 130 MM HG: CPT | Mod: CPTII,S$GLB,, | Performed by: PODIATRIST

## 2018-04-13 PROCEDURE — 3078F DIAST BP <80 MM HG: CPT | Mod: CPTII,S$GLB,, | Performed by: PODIATRIST

## 2018-04-13 PROCEDURE — 3074F SYST BP LT 130 MM HG: CPT | Mod: CPTII,S$GLB,, | Performed by: NURSE PRACTITIONER

## 2018-04-13 PROCEDURE — 99999 PR PBB SHADOW E&M-EST. PATIENT-LVL II: CPT | Mod: PBBFAC,,, | Performed by: PODIATRIST

## 2018-04-13 PROCEDURE — 99499 UNLISTED E&M SERVICE: CPT | Mod: S$GLB,,, | Performed by: NURSE PRACTITIONER

## 2018-04-13 PROCEDURE — 99999 PR PBB SHADOW E&M-EST. PATIENT-LVL V: CPT | Mod: PBBFAC,,, | Performed by: NURSE PRACTITIONER

## 2018-04-13 PROCEDURE — 99212 OFFICE O/P EST SF 10 MIN: CPT | Mod: S$GLB,,, | Performed by: PODIATRIST

## 2018-04-13 PROCEDURE — 3044F HG A1C LEVEL LT 7.0%: CPT | Mod: CPTII,S$GLB,, | Performed by: PODIATRIST

## 2018-04-13 PROCEDURE — 99214 OFFICE O/P EST MOD 30 MIN: CPT | Mod: S$GLB,,, | Performed by: NURSE PRACTITIONER

## 2018-04-13 PROCEDURE — 3078F DIAST BP <80 MM HG: CPT | Mod: CPTII,S$GLB,, | Performed by: NURSE PRACTITIONER

## 2018-04-13 PROCEDURE — 3044F HG A1C LEVEL LT 7.0%: CPT | Mod: CPTII,S$GLB,, | Performed by: NURSE PRACTITIONER

## 2018-04-13 RX ORDER — GABAPENTIN 800 MG/1
800 TABLET ORAL 3 TIMES DAILY
Qty: 270 TABLET | Refills: 3 | Status: SHIPPED | OUTPATIENT
Start: 2018-04-13 | End: 2018-09-01 | Stop reason: SDUPTHER

## 2018-04-13 RX ORDER — CEPHALEXIN 500 MG/1
500 CAPSULE ORAL EVERY 6 HOURS
Qty: 40 CAPSULE | Refills: 0 | Status: SHIPPED | OUTPATIENT
Start: 2018-04-13 | End: 2018-06-02

## 2018-04-13 NOTE — PROGRESS NOTES
Subjective:      Patient ID: Nelly Tian is a 67 y.o. female.    Chief Complaint: Foot Pain (follow up right foot )    Sore and redness right foot.  rapid onset dropping something on it, not improving over past 2 weeks, aggravated by increased weight bearing, shoe gear, pressure.  No previous medical treatment.  OTC pain med not helping.  Denies surgery right foot.  xrays and arterial dopplers negative.  Review of Systems   Constitution: Negative for chills, diaphoresis, fever, malaise/fatigue and night sweats.   Cardiovascular: Negative for claudication, cyanosis, leg swelling and syncope.   Skin: Positive for poor wound healing. Negative for color change, dry skin, nail changes, rash, suspicious lesions and unusual hair distribution.   Musculoskeletal: Negative for falls, joint pain, joint swelling, muscle cramps, muscle weakness and stiffness.   Gastrointestinal: Negative for constipation, diarrhea, nausea and vomiting.   Neurological: Positive for sensory change. Negative for brief paralysis, disturbances in coordination, focal weakness, numbness, paresthesias and tremors.           Objective:      Physical Exam   Constitutional: She is oriented to person, place, and time. She appears well-developed and well-nourished. She is cooperative.   Oriented to time, place, and person.   Cardiovascular:   Pulses:       Dorsalis pedis pulses are 1+ on the right side, and 1+ on the left side.        Posterior tibial pulses are 1+ on the right side, and 1+ on the left side.   Capillary fill time 3-5 seconds.  All toes warm to touch.      Negative lower extremity edema bilateral.    Negative elevational pallor and dependent rubor bilateral.     Musculoskeletal:   Normal angle, base, station of gait. Decreased stride length, early heel off, moderately propulsive toe off bilateral.    All ten toes without clubbing, cyanosis, or signs of ischemia.      No pain to palpation bilateral lower extremities.      Range of  motion, stability, muscle strength, and muscle tone are age and health appropriate normal bilateral feet and legs.       Lymphadenopathy:   Negative lymphadenopathy bilateral popliteal fossa and tarsal tunnel.  Negative lymphangitic streaking bilateral foot/ankle bilateral.     Neurological: She is alert and oriented to person, place, and time. She has normal strength. She is not disoriented. She displays no atrophy and no tremor. A sensory deficit is present. She exhibits normal muscle tone.   Reflex Scores:       Patellar reflexes are 2+ on the right side and 2+ on the left side.       Achilles reflexes are 2+ on the right side and 2+ on the left side.  Decreased/absent vibratory sensation bilateral feet to 128Hz tuning fork.  Diminished/loss of protective sensation all toes bilateral to 10 gram monofilament.     Skin: Skin is warm, dry and intact. No abrasion, no bruising, no burn, no ecchymosis, no laceration, no lesion, no petechiae and no rash noted. She is not diaphoretic. No cyanosis or erythema. No pallor. Nails show no clubbing.   Skin thin, atrophic, with decreased density and distribution of pedal hair bilateral, but without hyperpigmentation, rhianna discoloration,  ulcers, masses, nodules or cords palpated bilateral feet and legs.      Wound:  Dorsal 1st interdigital/intermetatarsal space right    Size:    Pre [id number] (closed wound)      Base:  Stable thin light tan eschar without drainage.  No pus, tracking, fluctuance, malodor, or cardinal signs infection.    Borders:  Atrophic, flat, pink, blanchable skin edges without undermining.               Assessment:       No diagnosis found.      Plan:       There are no diagnoses linked to this encounter.  I counseled the patient on her conditions, their implications and medical management.    Cover wound with border gauze to protect.    Continue sx shoe right - ambulate to  Tolerance.    Adequate vitamin supplementation, protein intake, and hydration -  discussed with patient.        No Follow-up on file.

## 2018-04-13 NOTE — PROGRESS NOTES
"Subjective:       Patient ID: Nelly Tian is a 67 y.o. female.    Chief Complaint: Wound Check (abdomen)    Ms. Tian presents to the clinic today for follow up for wound to abdominal wall.  She is currently seeing a Wound Care physician who is slowly debriding the wound.  She also has home health with wound care.  She is attempting to lose weight through diet and exercise. She is not interested in bariatric surgery.  She states blood sugars are well controlled.  A1c is scheduled.  She is going through a lot of family issues with her parents' recent death.  She declines referral to Psychology for therapy stating "I don't need a therapist".  She is taking trazodone for anxiety which helps although she complains about having to stop clonazepam which she used to take.      Review of Systems   Constitutional: Negative for chills and fever.   Respiratory: Negative for cough and shortness of breath.    Cardiovascular: Negative for chest pain, palpitations and leg swelling.   Gastrointestinal: Negative for abdominal pain, constipation and diarrhea.   Skin: Positive for wound. Negative for rash.   Psychiatric/Behavioral: Positive for dysphoric mood. The patient is nervous/anxious.        Objective:      Physical Exam   Constitutional: She is oriented to person, place, and time. She appears well-nourished. No distress.   HENT:   Head: Normocephalic and atraumatic.   Right Ear: External ear normal.   Left Ear: External ear normal.   Mouth/Throat: Oropharynx is clear and moist. No oropharyngeal exudate.   Eyes: Pupils are equal, round, and reactive to light. Right eye exhibits no discharge. Left eye exhibits no discharge.   Neck: Neck supple. No thyromegaly present.   Cardiovascular: Normal rate and regular rhythm.  Exam reveals no gallop and no friction rub.    No murmur heard.  Pulmonary/Chest: Effort normal and breath sounds normal. No respiratory distress. She has no wheezes. She has no rales.   Abdominal: She " exhibits no distension. There is no tenderness. A hernia is present.   Lymphadenopathy:     She has no cervical adenopathy.   Neurological: She is alert and oriented to person, place, and time. Coordination normal.   Unsteady gait noted with cane   Skin: Skin is warm and dry.        Psychiatric: She has a normal mood and affect. Her behavior is normal. Thought content normal.   Vitals reviewed.          Current Outpatient Prescriptions:     albuterol (ACCUNEB) 0.63 mg/3 mL Nebu, , Disp: , Rfl:     albuterol (VENTOLIN HFA) 90 mcg/actuation inhaler, Inhale 2 puffs into the lungs every 6 (six) hours as needed. Rescue, Disp: 18 g, Rfl: 0    albuterol-ipratropium 2.5mg-0.5mg/3mL (DUO-NEB) 0.5 mg-3 mg(2.5 mg base)/3 mL nebulizer solution, INHALE THE CONTENTS OF 1 VIAL VIA NEBULIZER EVERY 6 HOURS AS NEEDED FOR  WHEEZING (DO NOT USE WITH ALBUTEROL INHALER), Disp: 90 mL, Rfl: 4    apixaban 5 mg Tab, Take 1 tablet (5 mg total) by mouth 2 (two) times daily., Disp: 90 tablet, Rfl: 3    ascorbic acid, vitamin C, (VITAMIN C) 500 MG tablet, Take 1 tablet (500 mg total) by mouth every evening., Disp: , Rfl:     atorvastatin (LIPITOR) 20 MG tablet, TAKE 1 TABLET (20 MG TOTAL) BY MOUTH ONCE DAILY., Disp: 90 tablet, Rfl: 3    budesonide (PULMICORT) 0.5 mg/2 mL nebulizer solution, Take 3 mLs (0.75 mg total) by nebulization once daily., Disp: 2 mL, Rfl: 3    cephALEXin (KEFLEX) 500 MG capsule, Take 1 capsule (500 mg total) by mouth every 6 (six) hours., Disp: 40 capsule, Rfl: 0    clonazePAM (KLONOPIN) 1 MG tablet, Take 1 tablet (1 mg total) by mouth nightly as needed for Anxiety. Dose has to be taper down., Disp: 20 tablet, Rfl: 1    collagenase (SANTYL) ointment, Apply topically once daily., Disp: 30 g, Rfl: 0    doxycycline (MONODOX) 100 MG capsule, Take 1 capsule (100 mg total) by mouth 2 (two) times daily., Disp: 20 capsule, Rfl: 0    DULoxetine (CYMBALTA) 30 MG capsule, , Disp: , Rfl:     furosemide (LASIX) 40 MG  tablet, TAKE ONE TABLET TWICE A DAYFOR FLUID OVERLOAD, Disp: 60 tablet, Rfl: 5    gabapentin (NEURONTIN) 600 MG tablet, , Disp: , Rfl:     gabapentin (NEURONTIN) 800 MG tablet, Take 1 tablet (800 mg total) by mouth 3 (three) times daily., Disp: 270 tablet, Rfl: 3    hydrocodone-acetaminophen 10-325mg (NORCO)  mg Tab, Take 1 tablet by mouth every 8 (eight) hours as needed for Pain (Do not take more than 3 a day. Do not mix with other narcotics.)., Disp: 90 tablet, Rfl: 0    KLOR-CON M20 20 mEq tablet, TAKE ONE TABLET BY MOUTH ONCE DAILY, Disp: 30 tablet, Rfl: 6    levalbuterol (XOPENEX) 0.63 mg/3 mL nebulizer solution, INHALE THE CONTENTS OF 1 VIAL BY NEBULIZATION 4 times  DAILY.    DX: J43.9, Disp: 792 mL, Rfl: 3    lidocaine 4 % Gel, Apply 1 application topically once daily., Disp: 113 g, Rfl: 2    lisinopril (PRINIVIL,ZESTRIL) 20 MG tablet, TAKE 1 TABLET EVERY DAY, Disp: 90 tablet, Rfl: 1    magnesium oxide (MAG-OX) 400 mg tablet, Take 1 tablet (400 mg total) by mouth 2 (two) times daily., Disp: , Rfl: 0    meloxicam (MOBIC) 7.5 MG tablet, TAKE 1 TABLET EVERY DAY, Disp: 90 tablet, Rfl: 1    metFORMIN (GLUCOPHAGE) 500 MG tablet, TAKE 1 TABLET TWICE DAILY WITH MEALS, Disp: 180 tablet, Rfl: 3    metoprolol succinate (TOPROL-XL) 25 MG 24 hr tablet, TAKE 1/2 TABLET EVERY DAY, Disp: 45 tablet, Rfl: 3    multivitamin (THERAGRAN) tablet, Take 1 tablet by mouth once daily., Disp: , Rfl:     nicotine (NICODERM CQ) 21 mg/24 hr, Place 1 patch onto the skin once daily., Disp: 30 patch, Rfl: 1    nystatin (MYCOSTATIN) cream, Apply topically 2 (two) times daily., Disp: 30 g, Rfl: 4    nystatin (MYCOSTATIN) powder, APPLY  POWDER TOPICALLY 4 TIMES DAILY, Disp: 120 g, Rfl: 3    nystatin-triamcinolone (MYCOLOG II) cream, APPLY SPARINGLY TO AFFECTED AREA(S) 3 TIMES DAILY AS NEEDED, Disp: 30 g, Rfl: 1    polyethylene glycol (GLYCOLAX) 17 gram/dose powder, Take 17 g by mouth once daily., Disp: 1530 g, Rfl: 3     spironolactone (ALDACTONE) 25 MG tablet, Take 1 tablet (25 mg total) by mouth once daily., Disp: 30 tablet, Rfl: 11    traZODone (DESYREL) 150 MG tablet, TAKE 1 TABLET EVERY EVENING., Disp: 90 tablet, Rfl: 3    TRUE METRIX GLUCOSE TEST STRIP Strp, TEST BLOOD SUGAR THREE TIMES DAILY, Disp: 300 strip, Rfl: 3    TRUEPLUS LANCETS 33 gauge Misc, USE THREE TIMES DAILY TO TEST BLOOD SUGAR, Disp: 300 each, Rfl: 3  Assessment:       1. Wound infection    2. Unsteady gait    3. Type 2 diabetes mellitus with complication, without long-term current use of insulin    4. Major depression, recurrent, chronic    5. Anxiety    6. Hypertension associated with diabetes    7. Morbid obesity with BMI of 40.0-44.9, adult        Plan:       Wound infection  Continue with wound care clinic and home health wound care.  -     cephALEXin (KEFLEX) 500 MG capsule; Take 1 capsule (500 mg total) by mouth every 6 (six) hours.  Dispense: 40 capsule; Refill: 0    Unsteady gait  Walker via home health.  -     SUBSEQUENT HOME HEALTH ORDERS    Type 2 diabetes mellitus with complication, without long-term current use of insulin  A1c scheduled with next labs.    Major depression, recurrent, chronic  Suggested Psychology referral and patient declined.    Anxiety  Continue trazodone; patient declines Psychology referral.    Hypertension associated with diabetes  Stable on current medication.    Morbid obesity with BMI of 40.0-44.9, adult  Low carbohydrate diet.  Avoid greasy/fatty foods.   Exercise daily.    Patient readiness: acceptance and barriers:none    During the course of the visit the patient was educated and counseled about the following:     Diabetes:  Discussed general issues about diabetes pathophysiology and management.  Encouraged aerobic exercise.  Hypertension:   Medication: no change.  Obesity:   General weight loss/lifestyle modification strategies discussed (elicit support from others; identify saboteurs; non-food rewards, etc).  Diet  interventions: moderate (500 kCal/d) deficit diet.  Regular aerobic exercise program discussed.    Goals: Diabetes: Maintain Hemoglobin A1C below 7, Hypertension: Reduce Blood Pressure and Obesity: Reduce calorie intake and BMI    Did patient meet goals/outcomes: Yes    The following self management tools provided: declined    Patient Instructions (the written plan) was given to the patient/family.     Time spent with patient: 15 minutes    Barriers to medications present (no )    Adverse reactions to current medications (no)    Over the counter medications reviewed (Yes)

## 2018-04-18 ENCOUNTER — OUTPATIENT CASE MANAGEMENT (OUTPATIENT)
Dept: ADMINISTRATIVE | Facility: OTHER | Age: 68
End: 2018-04-18

## 2018-04-18 ENCOUNTER — PATIENT MESSAGE (OUTPATIENT)
Dept: FAMILY MEDICINE | Facility: CLINIC | Age: 68
End: 2018-04-18

## 2018-04-18 DIAGNOSIS — S31.109A WOUND OF ABDOMEN: ICD-10-CM

## 2018-04-18 RX ORDER — METFORMIN HYDROCHLORIDE 500 MG/1
500 TABLET ORAL 2 TIMES DAILY WITH MEALS
Qty: 180 TABLET | Refills: 3 | Status: SHIPPED | OUTPATIENT
Start: 2018-04-18 | End: 2019-02-08 | Stop reason: SDUPTHER

## 2018-04-18 NOTE — PROGRESS NOTES
4/18/2018 Summary:  Follow up call with patient today.  Patient reports doing well, no new complaints concerns.  Reviewed with patient recent clinic visits.  Patient being followed for abd wound check, and for wound to r foot.  Patient reports taking antibiotic encouraged medication compliance.  Patient reports HH still following, SN visits for wound care.  Patient is aware of signs and symptoms of wound infection.  Patient states to me they are getting better.  Patient denies any SOB, cough.  Reviewed with patient signs and symptoms of COPD, she is aware, encouraged smoking cessation, mailed information re resources for smoking cessation.   Reviewed with patient up coming appointment dates/times, encouraged appointment compliance.  Encouraged patient to call with any needs, will follow up with patient in couple weeks.  Interventions: Sparkle education, wound care, appointment compliance, medication compliance.  Plan:  Diet review, encouraged exercises.  JUANITA Zhu

## 2018-04-19 RX ORDER — METFORMIN HYDROCHLORIDE 500 MG/1
500 TABLET ORAL 2 TIMES DAILY WITH MEALS
Qty: 180 TABLET | Refills: 3 | Status: SHIPPED | OUTPATIENT
Start: 2018-04-19 | End: 2018-06-02 | Stop reason: SDUPTHER

## 2018-04-20 ENCOUNTER — PATIENT MESSAGE (OUTPATIENT)
Dept: CARDIOLOGY | Facility: CLINIC | Age: 68
End: 2018-04-20

## 2018-04-20 ENCOUNTER — TELEPHONE (OUTPATIENT)
Dept: FAMILY MEDICINE | Facility: CLINIC | Age: 68
End: 2018-04-20

## 2018-04-20 ENCOUNTER — PATIENT MESSAGE (OUTPATIENT)
Dept: FAMILY MEDICINE | Facility: CLINIC | Age: 68
End: 2018-04-20

## 2018-04-20 RX ORDER — APIXABAN 5 MG/1
TABLET, FILM COATED ORAL
Qty: 90 TABLET | Refills: 3 | Status: SHIPPED | OUTPATIENT
Start: 2018-04-20 | End: 2018-04-20 | Stop reason: SDUPTHER

## 2018-04-23 ENCOUNTER — PATIENT MESSAGE (OUTPATIENT)
Dept: CARDIOLOGY | Facility: CLINIC | Age: 68
End: 2018-04-23

## 2018-04-23 DIAGNOSIS — J43.9 PULMONARY EMPHYSEMA, UNSPECIFIED EMPHYSEMA TYPE: ICD-10-CM

## 2018-04-23 RX ORDER — NYSTATIN 100000 [USP'U]/G
POWDER TOPICAL 4 TIMES DAILY
Qty: 120 G | Refills: 3 | Status: SHIPPED | OUTPATIENT
Start: 2018-04-23 | End: 2018-08-22 | Stop reason: ALTCHOICE

## 2018-04-24 RX ORDER — LEVALBUTEROL INHALATION SOLUTION 0.63 MG/3ML
SOLUTION RESPIRATORY (INHALATION)
Qty: 792 ML | Refills: 3 | Status: SHIPPED | OUTPATIENT
Start: 2018-04-24 | End: 2018-12-17 | Stop reason: SDUPTHER

## 2018-04-24 RX ORDER — ALBUTEROL SULFATE 90 UG/1
2 AEROSOL, METERED RESPIRATORY (INHALATION) EVERY 6 HOURS PRN
Qty: 18 G | Refills: 0 | Status: SHIPPED | OUTPATIENT
Start: 2018-04-24 | End: 2018-05-14 | Stop reason: SDUPTHER

## 2018-04-27 DIAGNOSIS — J43.9 PULMONARY EMPHYSEMA, UNSPECIFIED EMPHYSEMA TYPE: ICD-10-CM

## 2018-04-27 RX ORDER — LEVALBUTEROL INHALATION SOLUTION 0.63 MG/3ML
SOLUTION RESPIRATORY (INHALATION)
Qty: 792 ML | Refills: 3 | Status: CANCELLED | OUTPATIENT
Start: 2018-04-27

## 2018-04-27 NOTE — TELEPHONE ENCOUNTER
Called pt regarding medication refill request. Informed pt that medication refill has already been submitted to pharmacy. Pt verbalized understanding with no further questions.

## 2018-05-01 DIAGNOSIS — L08.9 WOUND INFECTION: ICD-10-CM

## 2018-05-01 DIAGNOSIS — T14.8XXA WOUND INFECTION: ICD-10-CM

## 2018-05-01 RX ORDER — POLYETHYLENE GLYCOL 3350 17 G/17G
17 POWDER, FOR SOLUTION ORAL DAILY
Qty: 1530 G | Refills: 3 | Status: SHIPPED | OUTPATIENT
Start: 2018-05-01 | End: 2018-07-04 | Stop reason: SDUPTHER

## 2018-05-01 RX ORDER — CEPHALEXIN 500 MG/1
500 CAPSULE ORAL EVERY 6 HOURS
Qty: 40 CAPSULE | Refills: 0 | Status: CANCELLED | OUTPATIENT
Start: 2018-05-01

## 2018-05-01 NOTE — TELEPHONE ENCOUNTER
PAtient requesting refill of Keflex for abdominal wound. She is seeing Dr Gipson at the wound care clinic and reports that he wanted her to continue this medication.   Spoke to Ammy at [facility] wound care clinic. She states that the patient has an appointment this Thursday with Dr Gipson and she should discuss continuation of medication with him at that time. Patient missed her wound care appointment last week due to illness.   Left voicemail to inform patient she should discuss continuation of Keflex with Dr Gipson at her appointment on Thursday.

## 2018-05-02 ENCOUNTER — OUTPATIENT CASE MANAGEMENT (OUTPATIENT)
Dept: ADMINISTRATIVE | Facility: OTHER | Age: 68
End: 2018-05-02

## 2018-05-02 NOTE — PROGRESS NOTES
5/2/2018  Follow up call with patient today, briefly.  Patient reports being in the process of moving this week.  Patient reports that daughter, Maye is moving with her.  The new address will be [address] [place] [address]  Patient is looking forward to the move.  Encouraged safety with moving belongings and patient reports help with the larger items.  Informed patient I will follow up in couple weeks, for time to get settled in.  Encouraged patient to call with any needs I can assist her with.  Patient verbalized understanding, and confirmed she has my contact information.    Intervention; encouraged safety    Follow up Plan: update address, Diet review, encouraged exercises.  JUANITA Zhu

## 2018-05-03 DIAGNOSIS — M43.12 SPONDYLOLISTHESIS OF CERVICAL REGION: ICD-10-CM

## 2018-05-03 DIAGNOSIS — M51.36 LUMBAR DEGENERATIVE DISC DISEASE: ICD-10-CM

## 2018-05-03 NOTE — TELEPHONE ENCOUNTER
Patient requesting a refill of Hydrocodone.  LR--4-6-18  LOV--4-13-18  FOV--5-21-18  Urine Toxicology--10-25-17  Pain Contract--10-25-17

## 2018-05-04 DIAGNOSIS — M43.12 SPONDYLOLISTHESIS OF CERVICAL REGION: ICD-10-CM

## 2018-05-04 DIAGNOSIS — M51.36 LUMBAR DEGENERATIVE DISC DISEASE: ICD-10-CM

## 2018-05-04 RX ORDER — HYDROCODONE BITARTRATE AND ACETAMINOPHEN 10; 325 MG/1; MG/1
1 TABLET ORAL EVERY 8 HOURS PRN
Qty: 90 TABLET | Refills: 0 | OUTPATIENT
Start: 2018-05-04

## 2018-05-04 RX ORDER — HYDROCODONE BITARTRATE AND ACETAMINOPHEN 10; 325 MG/1; MG/1
1 TABLET ORAL EVERY 8 HOURS PRN
Qty: 90 TABLET | Refills: 0 | Status: SHIPPED | OUTPATIENT
Start: 2018-05-04 | End: 2018-06-02 | Stop reason: SDUPTHER

## 2018-05-07 RX ORDER — METOPROLOL SUCCINATE 25 MG/1
TABLET, EXTENDED RELEASE ORAL
Qty: 45 TABLET | Refills: 3 | Status: SHIPPED | OUTPATIENT
Start: 2018-05-07 | End: 2018-12-28 | Stop reason: SDUPTHER

## 2018-05-10 ENCOUNTER — TELEPHONE (OUTPATIENT)
Dept: FAMILY MEDICINE | Facility: CLINIC | Age: 68
End: 2018-05-10

## 2018-05-10 ENCOUNTER — PATIENT MESSAGE (OUTPATIENT)
Dept: FAMILY MEDICINE | Facility: CLINIC | Age: 68
End: 2018-05-10

## 2018-05-10 DIAGNOSIS — S31.109A WOUND OF ABDOMEN: ICD-10-CM

## 2018-05-10 DIAGNOSIS — S31.109A WOUND OF ABDOMEN: Primary | ICD-10-CM

## 2018-05-10 NOTE — TELEPHONE ENCOUNTER
----- Message from Aubrie Langston sent at 5/10/2018  1:09 PM CDT -----  Contact: [facility] [phone]  Type:  Pharmacy Calling to Clarify an RX    Name of Caller:    Pharmacy Name:  [facility]   Prescription Name:  Lidocaine   What do they need to clarify?: No Best Call Back Number:  [phone]  Additional Information:  Rx  lidocaine 4 % doesn't exit.Rx brand only lidocaine 3 % gel, rx lidocaine is available in cream 4 % and  ointment 4 %

## 2018-05-11 ENCOUNTER — TELEPHONE (OUTPATIENT)
Dept: FAMILY MEDICINE | Facility: CLINIC | Age: 68
End: 2018-05-11

## 2018-05-11 DIAGNOSIS — Z13.5 DIABETIC RETINOPATHY SCREENING: ICD-10-CM

## 2018-05-11 NOTE — TELEPHONE ENCOUNTER
----- Message from Sara Hodge sent at 5/11/2018  4:04 PM CDT -----  Contact: Chiquis with [facility]   Chiquis with [facility] is needing to speak to the nurse about patients wounds     Please call back [phone]

## 2018-05-11 NOTE — TELEPHONE ENCOUNTER
Please see attached message from Chiquis with [facility]. Call placed to Mrs Sim. No answer received. Left message requesting return call to office.

## 2018-05-14 RX ORDER — ALBUTEROL SULFATE 90 UG/1
2 AEROSOL, METERED RESPIRATORY (INHALATION) EVERY 6 HOURS PRN
Qty: 18 G | Refills: 0 | Status: SHIPPED | OUTPATIENT
Start: 2018-05-14 | End: 2018-06-23 | Stop reason: SDUPTHER

## 2018-05-14 NOTE — TELEPHONE ENCOUNTER
Spoke to Chiquis with [facility] who states patient was concerned that an antibiotic that she has been taking for her wounds was discontinued. Upon further inspection it was noted patient has been seeing Mrs. Tamy Garcia NP related to wounds. On 4-13-18 a prescription for Keflex 500 mg (40) capsules was written. Writer advised patient call and follow up with Tamy Garcia NP regarding antibiotic therapy due to  Radha being the person who prescribed this. Mrs. Sim states patient is concerned that she still has wounds and no antibiotics to take regarding. Mrs. Sim states she will advise patient to follow up with Tamy Garcia regarding. Patient has a FOV on 5-21-18

## 2018-05-15 ENCOUNTER — OUTPATIENT CASE MANAGEMENT (OUTPATIENT)
Dept: ADMINISTRATIVE | Facility: OTHER | Age: 68
End: 2018-05-15

## 2018-05-15 DIAGNOSIS — E11.8 TYPE 2 DIABETES MELLITUS WITH COMPLICATION, WITHOUT LONG-TERM CURRENT USE OF INSULIN: Primary | ICD-10-CM

## 2018-05-15 NOTE — PROGRESS NOTES
5/15/2018  Follow up done with patient today.  Patient reports settled into the new house, updated current address.  Patient lives with daughter Maye Tirado @ [address], [place] [address].  Patient complains of new wound to abd area, patient has been tending to previous wounds.  Followed by wound care clinic.  Encouraged her to keep the area's clean and dry, patient states that she does.  Denies any fever, and reports clear drainage.  Encouraged patient to report to providers if she starts running fever or chills, patient verbalized understanding.  Patient with f/u appointment with Dr. Carrasquillo next Monday May 21 @ 5:20pm.  Plan to follow up appointment with call on Tuesday, then will close case.      Intervention: wound care education, S&S infection, appointment compliance encouraged.    Follow up: wound care progress, close case.  JUANITA Zhu

## 2018-05-17 ENCOUNTER — PATIENT MESSAGE (OUTPATIENT)
Dept: ADMINISTRATIVE | Facility: HOSPITAL | Age: 68
End: 2018-05-17

## 2018-05-21 ENCOUNTER — OUTPATIENT CASE MANAGEMENT (OUTPATIENT)
Dept: ADMINISTRATIVE | Facility: OTHER | Age: 68
End: 2018-05-21

## 2018-05-21 NOTE — PROGRESS NOTES
5/21/2018  Unable to complete follow up call with me states she is getting her dressings changed.  Informed patient I will follow up with her tomorrow.  JUANITA Zhu

## 2018-05-22 ENCOUNTER — OUTPATIENT CASE MANAGEMENT (OUTPATIENT)
Dept: ADMINISTRATIVE | Facility: OTHER | Age: 68
End: 2018-05-22

## 2018-05-22 ENCOUNTER — PATIENT MESSAGE (OUTPATIENT)
Dept: FAMILY MEDICINE | Facility: CLINIC | Age: 68
End: 2018-05-22

## 2018-05-22 RX ORDER — BLOOD-GLUCOSE METER
EACH MISCELLANEOUS
Qty: 1 EACH | Refills: 0 | Status: SHIPPED | OUTPATIENT
Start: 2018-05-22 | End: 2018-05-23 | Stop reason: SDUPTHER

## 2018-05-22 NOTE — PROGRESS NOTES
5/22/2018  Second attempt to complete follow up for Outpatient Care Management, left message requesting a return call.  JUANITA Zhu

## 2018-05-23 ENCOUNTER — TELEPHONE (OUTPATIENT)
Dept: ORTHOPEDICS | Facility: CLINIC | Age: 68
End: 2018-05-23

## 2018-05-23 NOTE — TELEPHONE ENCOUNTER
LVM we do not have clinic for the rest of this week and if cshe could give us a call back we will be glad to schedule her an appt for next week.

## 2018-05-24 ENCOUNTER — PATIENT MESSAGE (OUTPATIENT)
Dept: FAMILY MEDICINE | Facility: CLINIC | Age: 68
End: 2018-05-24

## 2018-05-25 ENCOUNTER — OUTPATIENT CASE MANAGEMENT (OUTPATIENT)
Dept: ADMINISTRATIVE | Facility: OTHER | Age: 68
End: 2018-05-25

## 2018-05-25 ENCOUNTER — TELEPHONE (OUTPATIENT)
Dept: FAMILY MEDICINE | Facility: CLINIC | Age: 68
End: 2018-05-25

## 2018-05-25 RX ORDER — TRAZODONE HYDROCHLORIDE 150 MG/1
150 TABLET ORAL NIGHTLY
Qty: 90 TABLET | Refills: 3 | Status: CANCELLED | OUTPATIENT
Start: 2018-05-25

## 2018-05-25 RX ORDER — INSULIN PUMP SYRINGE, 3 ML
EACH MISCELLANEOUS
Qty: 1 EACH | Refills: 0 | Status: SHIPPED | OUTPATIENT
Start: 2018-05-25 | End: 2018-06-19

## 2018-05-25 NOTE — PROGRESS NOTES
"5/25/2018  Follow up completed with patient this afternoon.  Patient reports knee pain, states "I'm due for injection again".  Patient has appointment with Orthopedics on 5/30 she is aware of date/time, encouraged appointment compliance.  Patient reports no new falls, encouraged safety.  Patient reports no SOB, or cough.  Patient is aware of COPD triggers, and to avoid them.  Patient without any questions re COPD, encouraged oxygen use, and oxygen safety.  Patient is requesting a new Pill box, as her's is broken, will send one today via mail.  Informed patient I will be closing her case at this time, patient is agreeable.  Encouraged her to call me with any future needs, confirmed she has my contact information.      Intervention: mail pill box, close case.  JUANITA Zhu      "

## 2018-05-30 ENCOUNTER — OFFICE VISIT (OUTPATIENT)
Dept: ORTHOPEDICS | Facility: CLINIC | Age: 68
End: 2018-05-30
Payer: MEDICARE

## 2018-05-30 ENCOUNTER — PATIENT MESSAGE (OUTPATIENT)
Dept: ADMINISTRATIVE | Facility: HOSPITAL | Age: 68
End: 2018-05-30

## 2018-05-30 VITALS
DIASTOLIC BLOOD PRESSURE: 61 MMHG | BODY MASS INDEX: 43.84 KG/M2 | HEIGHT: 68 IN | SYSTOLIC BLOOD PRESSURE: 129 MMHG | HEART RATE: 99 BPM | WEIGHT: 289.25 LBS

## 2018-05-30 DIAGNOSIS — M17.0 PRIMARY OSTEOARTHRITIS OF BOTH KNEES: Primary | ICD-10-CM

## 2018-05-30 PROCEDURE — 3074F SYST BP LT 130 MM HG: CPT | Mod: CPTII,S$GLB,, | Performed by: ORTHOPAEDIC SURGERY

## 2018-05-30 PROCEDURE — 3078F DIAST BP <80 MM HG: CPT | Mod: CPTII,S$GLB,, | Performed by: ORTHOPAEDIC SURGERY

## 2018-05-30 PROCEDURE — 99213 OFFICE O/P EST LOW 20 MIN: CPT | Mod: 25,S$GLB,, | Performed by: ORTHOPAEDIC SURGERY

## 2018-05-30 PROCEDURE — 99999 PR PBB SHADOW E&M-EST. PATIENT-LVL III: CPT | Mod: PBBFAC,,, | Performed by: ORTHOPAEDIC SURGERY

## 2018-05-30 PROCEDURE — 20610 DRAIN/INJ JOINT/BURSA W/O US: CPT | Mod: 50,S$GLB,, | Performed by: ORTHOPAEDIC SURGERY

## 2018-05-30 RX ADMIN — TRIAMCINOLONE ACETONIDE 40 MG: 40 INJECTION, SUSPENSION INTRA-ARTICULAR; INTRAMUSCULAR at 11:05

## 2018-05-31 RX ORDER — TRIAMCINOLONE ACETONIDE 40 MG/ML
40 INJECTION, SUSPENSION INTRA-ARTICULAR; INTRAMUSCULAR
Status: DISCONTINUED | OUTPATIENT
Start: 2018-05-30 | End: 2018-06-01 | Stop reason: HOSPADM

## 2018-06-01 NOTE — PROCEDURES
Large Joint Aspiration/Injection  Date/Time: 5/30/2018 11:03 PM  Performed by: DEE WILLIAMSON  Authorized by: DEE WILLIAMSON     Consent Done?:  Yes (Verbal)  Indications:  Pain  Timeout: Prior to procedure the correct patient, procedure, and site was verified      Location:  Knee  Site:  R knee and L knee  Prep: Patient was prepped and draped in usual sterile fashion    Approach:  Anteromedial  Medications:  40 mg triamcinolone acetonide 40 mg/mL; 40 mg triamcinolone acetonide 40 mg/mL

## 2018-06-01 NOTE — PROGRESS NOTES
Past Medical History:   Diagnosis Date    *Atrial flutter     Angina pectoris 2017    Anxiety     Arthritis     Asthma     Atrial fibrillation     Back pain     Cataract     OD    CHF (congestive heart failure)     COPD (chronic obstructive pulmonary disease)     Depression     Diabetes mellitus     Emphysema of lung     Heart failure     Hepatomegaly 2/3/2016    Hernia     History of MI (myocardial infarction) 2016    Hypercapnic respiratory failure, chronic 2016    Hyperlipidemia     Hypertension     Iron deficiency anemia 2/3/2016    Myocardial infarction     Obesity     Peripheral vascular disease     Pneumonia     Polyneuropathy     Retinal detachment     OS    Septic shock 2017    Skin ulcer 3/18/2017    Tobacco dependence     Type II 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, not stated as uncontrolled(250.60)        Past Surgical History:   Procedure Laterality Date    BREAST BIOPSY Left 10 yrs ago    benign    CATARACT EXTRACTION Left     OS      SECTION      x2    EYE SURGERY      HYSTERECTOMY      partial    OOPHORECTOMY      one ovary conserved    RETINAL DETACHMENT SURGERY      buckle --OS    TONSILLECTOMY         Current Outpatient Prescriptions   Medication Sig    albuterol (ACCUNEB) 0.63 mg/3 mL Nebu     albuterol (VENTOLIN HFA) 90 mcg/actuation inhaler Inhale 2 puffs into the lungs every 6 (six) hours as needed. Rescue    albuterol-ipratropium 2.5mg-0.5mg/3mL (DUO-NEB) 0.5 mg-3 mg(2.5 mg base)/3 mL nebulizer solution INHALE THE CONTENTS OF 1 VIAL VIA NEBULIZER EVERY 6 HOURS AS NEEDED FOR  WHEEZING (DO NOT USE WITH ALBUTEROL INHALER)    apixaban (ELIQUIS) 5 mg Tab Take 1 tablet (5 mg total) by mouth 2 (two) times daily.    ascorbic acid, vitamin C, (VITAMIN C) 500 MG tablet Take 1 tablet (500 mg total) by mouth every evening.    atorvastatin (LIPITOR) 20 MG tablet TAKE 1 TABLET (20 MG TOTAL) BY  MOUTH ONCE DAILY.    benzocaine 20 % Oint Apply 1 application topically every 6 (six) hours as needed.    blood sugar diagnostic (TRUE METRIX GLUCOSE TEST STRIP) Strp Use to test blood sugar three times a day and Provide lancets too.  DX: E11.40    blood-glucose meter (TRUE METRIX AIR GLUCOSE METER) kit Use as instructed to test blood sugar   DX: E11.40    budesonide (PULMICORT) 0.5 mg/2 mL nebulizer solution Take 3 mLs (0.75 mg total) by nebulization once daily.    cephALEXin (KEFLEX) 500 MG capsule Take 1 capsule (500 mg total) by mouth every 6 (six) hours.    clonazePAM (KLONOPIN) 1 MG tablet Take 1 tablet (1 mg total) by mouth nightly as needed for Anxiety. Dose has to be taper down.    collagenase (SANTYL) ointment Apply topically once daily.    doxycycline (MONODOX) 100 MG capsule Take 1 capsule (100 mg total) by mouth 2 (two) times daily.    DULoxetine (CYMBALTA) 30 MG capsule     furosemide (LASIX) 40 MG tablet TAKE ONE TABLET TWICE A DAYFOR FLUID OVERLOAD    gabapentin (NEURONTIN) 800 MG tablet Take 1 tablet (800 mg total) by mouth 3 (three) times daily.    hydrocodone-acetaminophen 10-325mg (NORCO)  mg Tab Take 1 tablet by mouth every 8 (eight) hours as needed for Pain (Do not take more than 3 a day. Do not mix with other narcotics.).    KLOR-CON M20 20 mEq tablet TAKE ONE TABLET BY MOUTH ONCE DAILY    levalbuterol (XOPENEX) 0.63 mg/3 mL nebulizer solution INHALE THE CONTENTS OF 1 VIAL BY NEBULIZATION 4 times  DAILY.    DX: J43.9    lidocaine 4 % Gel Apply 1 application topically once daily.    lisinopril (PRINIVIL,ZESTRIL) 20 MG tablet TAKE 1 TABLET EVERY DAY    magnesium oxide (MAG-OX) 400 mg tablet Take 1 tablet (400 mg total) by mouth 2 (two) times daily.    meloxicam (MOBIC) 7.5 MG tablet TAKE 1 TABLET EVERY DAY    metFORMIN (GLUCOPHAGE) 500 MG tablet Take 1 tablet (500 mg total) by mouth 2 (two) times daily with meals.    metFORMIN (GLUCOPHAGE) 500 MG tablet Take 1 tablet  (500 mg total) by mouth 2 (two) times daily with meals.    metoprolol succinate (TOPROL-XL) 25 MG 24 hr tablet TAKE 1/2 TABLET EVERY DAY    multivitamin (THERAGRAN) tablet Take 1 tablet by mouth once daily.    nystatin (MYCOSTATIN) cream Apply topically 2 (two) times daily.    nystatin (MYCOSTATIN) powder Apply topically 4 (four) times daily.    nystatin-triamcinolone (MYCOLOG II) cream APPLY SPARINGLY TO AFFECTED AREA(S) 3 TIMES DAILY AS NEEDED    polyethylene glycol (GLYCOLAX) 17 gram/dose powder Take 17 g by mouth once daily.    spironolactone (ALDACTONE) 25 MG tablet Take 1 tablet (25 mg total) by mouth once daily.    traZODone (DESYREL) 150 MG tablet TAKE 1 TABLET EVERY EVENING.    TRUEPLUS LANCETS 33 gauge Misc USE THREE TIMES DAILY TO TEST BLOOD SUGAR     No current facility-administered medications for this visit.        Allergies   Allergen Reactions    Dilaudid [Hydromorphone] Anaphylaxis     Other reaction(s): Anaphylaxis  Other reaction(s): Unknown       Family History   Problem Relation Age of Onset    Arthritis Father     Heart disease Father     Early death Sister 30        heart disease    Heart disease Sister 32        MI    No Known Problems Brother     No Known Problems Daughter     Blindness Son         due to accident//    Arthritis Sister     Heart disease Sister     Crohn's disease Sister     Cancer Brother         asbestosis    Alcohol abuse Mother     Breast cancer Neg Hx     Glaucoma Neg Hx     Macular degeneration Neg Hx     Retinal detachment Neg Hx     Amblyopia Neg Hx     Diabetes Neg Hx     Cataracts Neg Hx     Hypertension Neg Hx     Strabismus Neg Hx     Stroke Neg Hx     Thyroid disease Neg Hx        Social History     Social History    Marital status:      Spouse name: N/A    Number of children: N/A    Years of education: N/A     Occupational History    Not on file.     Social History Main Topics    Smoking status: Current Every Day  Smoker     Packs/day: 0.25     Years: 50.00     Types: Cigarettes     Start date: 1/19/1968    Smokeless tobacco: Never Used    Alcohol use No    Drug use: No    Sexual activity: Not Currently     Other Topics Concern    Not on file     Social History Narrative    No narrative on file       Chief Complaint:   Chief Complaint   Patient presents with    Right Knee - Pain    Left Knee - Pain       Consulting Physician: No ref. provider found    History of present illness: This is a 64-year-old young lady who reports bilateral knee pain.  Pain today 7/10. No new injury.    Review of Systems:    Constitution: Denies chills, fever, and sweats.    HENT: Denies headaches. Reports blurry vision.    Cardiovascular: Denies chest pain or irregular heart beat.    Respiratory: Denies cough. Reports shortness of breath.    Gastrointestinal: Denies abdominal pain, nausea, or vomiting.    Musculoskeletal:  Denies muscle cramps.    Neurological: Denies dizziness or focal weakness.    Psychiatric/Behavioral: Normal mental status. Anxiety.    Hematologic/Lymphatic: Denies bleeding problem or easy bruising/bleeding.    Skin: Denies rash or suspicious lesions.      Examination:    Vital Signs:    Vitals:    05/30/18 1336   BP: 129/61   Pulse: 99       Body mass index is 43.98 kg/m².    This a well-developed, well nourished patient in no acute distress.    Alert and oriented and cooperative to examination.       Physical Exam: Left Knee Exam    Gait   antalgic    Skin  Rash:   None  Scars:   None    Inspection  Erythema:  None  Ecchymosis:  None  Effusion:  Mild  Masses:  None  Lymphadenopathy: None    Range of Motion: Full - 0 to 130°    Medial Joint : Mild  Lateral Joint : Mild    Patellofemoral Tenderness: None  Patellofemoral Crepitus: None    Lachman:  Normal  Anterior Drawer: Normal  Posterior Drawer: Normal    Dilia's:  Negative  Apley's:  Negative    Varus Stress:  Stable  Valgus  Stress: Stable    Strength:  5/5    Pulses:  Palpable distal    Sensation:  Intact      Right Knee Exam    Gait:   Antalgic    Skin  Rash:   None  Scars:   None    Inspection  Erythema:  None  Ecchymosis:  None  Effusion:  Mild  Masses:  None  Lymphadenopathy: None    Range of Motion: Full - 0 to 130°    Medial Joint : Mild  Lateral Joint : Mild    Patellofemoral Tenderness: None  Patellofemoral Crepitus: None    Lachman:  Normal  Anterior Drawer: Normal  Posterior Drawer: Normal    Dilia's:  Negative  Apley's:  Negative    Varus Stress:  Stable  Valgus Stress: Stable    Strength:  5/5    Pulses:  Palpable distal    Sensation:  Intact          Imaging: X-rays of both knees reveals some degenerative changes that are moderate.     Assessment: Primary osteoarthritis of both knees  -     Large Joint Aspiration/Injection        Plan: Steroid today and then Euflexxa series    DISCLAIMER: This note may have been dictated using voice recognition software and may contain grammatical errors. Report sent to referring provider as required.

## 2018-06-02 ENCOUNTER — OFFICE VISIT (OUTPATIENT)
Dept: FAMILY MEDICINE | Facility: CLINIC | Age: 68
End: 2018-06-02
Payer: MEDICARE

## 2018-06-02 VITALS
HEIGHT: 68 IN | BODY MASS INDEX: 43.65 KG/M2 | SYSTOLIC BLOOD PRESSURE: 143 MMHG | OXYGEN SATURATION: 97 % | DIASTOLIC BLOOD PRESSURE: 63 MMHG | TEMPERATURE: 98 F | WEIGHT: 288 LBS | HEART RATE: 71 BPM

## 2018-06-02 DIAGNOSIS — Z72.0 TOBACCO ABUSE: Primary | ICD-10-CM

## 2018-06-02 DIAGNOSIS — L08.9: ICD-10-CM

## 2018-06-02 DIAGNOSIS — J41.8 MIXED SIMPLE AND MUCOPURULENT CHRONIC BRONCHITIS: ICD-10-CM

## 2018-06-02 DIAGNOSIS — E11.65 TYPE 2 DIABETES MELLITUS WITH HYPERGLYCEMIA, WITHOUT LONG-TERM CURRENT USE OF INSULIN: ICD-10-CM

## 2018-06-02 DIAGNOSIS — M43.12 SPONDYLOLISTHESIS OF CERVICAL REGION: ICD-10-CM

## 2018-06-02 DIAGNOSIS — M51.36 LUMBAR DEGENERATIVE DISC DISEASE: ICD-10-CM

## 2018-06-02 DIAGNOSIS — L89.92: ICD-10-CM

## 2018-06-02 DIAGNOSIS — I10 ESSENTIAL HYPERTENSION: ICD-10-CM

## 2018-06-02 PROCEDURE — 99999 PR PBB SHADOW E&M-EST. PATIENT-LVL III: CPT | Mod: PBBFAC,,, | Performed by: FAMILY MEDICINE

## 2018-06-02 PROCEDURE — 3078F DIAST BP <80 MM HG: CPT | Mod: CPTII,S$GLB,, | Performed by: FAMILY MEDICINE

## 2018-06-02 PROCEDURE — 99214 OFFICE O/P EST MOD 30 MIN: CPT | Mod: S$GLB,,, | Performed by: FAMILY MEDICINE

## 2018-06-02 PROCEDURE — 3044F HG A1C LEVEL LT 7.0%: CPT | Mod: CPTII,S$GLB,, | Performed by: FAMILY MEDICINE

## 2018-06-02 PROCEDURE — 3077F SYST BP >= 140 MM HG: CPT | Mod: CPTII,S$GLB,, | Performed by: FAMILY MEDICINE

## 2018-06-02 PROCEDURE — 99499 UNLISTED E&M SERVICE: CPT | Mod: S$PBB,,, | Performed by: FAMILY MEDICINE

## 2018-06-02 RX ORDER — HYDROCODONE BITARTRATE AND ACETAMINOPHEN 10; 325 MG/1; MG/1
1 TABLET ORAL EVERY 8 HOURS PRN
Qty: 90 TABLET | Refills: 0 | Status: SHIPPED | OUTPATIENT
Start: 2018-06-02 | End: 2018-06-29 | Stop reason: SDUPTHER

## 2018-06-02 RX ORDER — ASPIRIN 81 MG/1
81 TABLET ORAL DAILY
COMMUNITY
End: 2018-10-12 | Stop reason: ALTCHOICE

## 2018-06-02 RX ORDER — SILVER SULFADIAZINE 10 G/1000G
CREAM TOPICAL DAILY
Qty: 50 G | Refills: 0 | Status: SHIPPED | OUTPATIENT
Start: 2018-06-02 | End: 2018-12-06

## 2018-06-02 RX ORDER — CLONAZEPAM 1 MG/1
1 TABLET ORAL 2 TIMES DAILY PRN
Qty: 60 TABLET | Refills: 3 | Status: SHIPPED | OUTPATIENT
Start: 2018-06-02 | End: 2018-08-11 | Stop reason: SDUPTHER

## 2018-06-02 RX ORDER — FLUTICASONE FUROATE AND VILANTEROL 100; 25 UG/1; UG/1
1 POWDER RESPIRATORY (INHALATION) DAILY
Qty: 60 EACH | Refills: 4 | Status: SHIPPED | OUTPATIENT
Start: 2018-06-02 | End: 2018-08-03 | Stop reason: SDUPTHER

## 2018-06-07 DIAGNOSIS — S31.109A WOUND OF ABDOMEN: ICD-10-CM

## 2018-06-07 RX ORDER — COLLAGENASE SANTYL 250 [ARB'U]/G
OINTMENT TOPICAL
Refills: 0 | OUTPATIENT
Start: 2018-06-07

## 2018-06-09 NOTE — PROGRESS NOTES
Subjective:       Patient ID: Nelly Tian is a 67 y.o. female.    Chief Complaint: leg wound    Patient Active Problem List:     Diabetes mellitus with neuropathy     Long term current use of anticoagulant therapy     Arthritis     Major depression, recurrent, chronic     DM II (diabetes mellitus, type II), controlled     BMI 36.0-36.9,adult     GERD (gastroesophageal reflux disease)     Tobacco dependency     Asthma     Chronic atrial fibrillation     Hypertension associated with diabetes     Morbid obesity with BMI of 45.0-49.9, adult     PVD (peripheral vascular disease)     Abdominal aortic atherosclerosis     Dependency on pain medication     History of MI (myocardial infarction)     Iron deficiency anemia     Hepatomegaly     DDD (degenerative disc disease), lumbar     Anxiety     Type 2 diabetes mellitus with complication, without long-term current use of insulin     Hyperlipidemia     NSAID long-term use     Osteoarthritis of right hip     Mixed restrictive and obstructive lung disease     Hypercapnic respiratory failure, chronic     Chronic respiratory failure with hypoxia     Obesity, Class III, BMI 40-49.9 (morbid obesity)     Yeast dermatitis     Nonhealing skin ulcer, limited to breakdown of skin     Pressure ulcer, stage 3     Infected wound     COPD (chronic obstructive pulmonary disease)     Microcytic anemia     Cellulitis, abdominal wall     Constipation     Chronic respiratory failure     Diabetes mellitus type 2 in obese    She has been with tapering her use of tobacco with fair results. The fasting blood sugars are improved but her activity did not. She is still very sedentary but her SOB is ventral hernia with sm better.Also has recurrent abdominal sores under Dr Jennings.      Hypertension   This is a chronic problem. The problem has been resolved since onset. The problem is controlled. Associated symptoms include anxiety, orthopnea, peripheral edema and shortness of breath. Pertinent  negatives include no blurred vision, chest pain, headaches or palpitations. Agents associated with hypertension include NSAIDs. Risk factors for coronary artery disease include obesity, sedentary lifestyle and post-menopausal state. The current treatment provides moderate improvement. Hypertensive end-organ damage includes heart failure.   Diabetes   Pertinent negatives for hypoglycemia include no dizziness or headaches. Pertinent negatives for diabetes include no blurred vision, no chest pain and no fatigue.     Review of Systems   Constitutional: Negative for fatigue and unexpected weight change.   Eyes: Negative for blurred vision.   Respiratory: Positive for shortness of breath. Negative for chest tightness.    Cardiovascular: Positive for orthopnea. Negative for chest pain, palpitations and leg swelling.   Gastrointestinal: Negative for abdominal pain.   Musculoskeletal: Positive for back pain. Negative for arthralgias.   Neurological: Negative for dizziness, syncope, light-headedness and headaches.   Psychiatric/Behavioral: Positive for decreased concentration.       Objective:      Physical Exam   Constitutional: She is oriented to person, place, and time. She appears well-developed.   HENT:   Head: Normocephalic and atraumatic.   Right Ear: External ear normal.   Left Ear: External ear normal.   Nose: Nose normal.   Mouth/Throat: No oropharyngeal exudate.   Eyes: Conjunctivae and EOM are normal. Pupils are equal, round, and reactive to light. Right eye exhibits no discharge. Left eye exhibits no discharge. No scleral icterus.   Neck: Normal range of motion. Neck supple. No JVD present. No tracheal deviation present. No thyromegaly present.   Cardiovascular: Normal rate, normal heart sounds and intact distal pulses.  Exam reveals no gallop and no friction rub.    No murmur heard.  Pulses:       Dorsalis pedis pulses are 3+ on the right side, and 3+ on the left side.        Posterior tibial pulses are 3+ on  the right side, and 3+ on the left side.   Pulmonary/Chest: Effort normal. No stridor. No respiratory distress. She has no wheezes. She has no rales. She exhibits no tenderness.   Abdominal: Soft. Bowel sounds are normal. She exhibits no distension and no mass. There is no tenderness. There is no rebound and no guarding.       Dressing in place. Huge abdominal hernia,   Musculoskeletal: Normal range of motion. She exhibits no edema.   Feet:   Right Foot:   Protective Sensation: 6 sites tested. 6 sites sensed.   Skin Integrity: Negative for ulcer, blister or skin breakdown.   Left Foot:   Protective Sensation: 6 sites tested. 6 sites sensed.   Skin Integrity: Negative for ulcer, blister or skin breakdown.   Lymphadenopathy:     She has no cervical adenopathy.   Neurological: She is alert and oriented to person, place, and time. She displays normal reflexes. No cranial nerve deficit. She exhibits normal muscle tone. Coordination normal.   Skin: Skin is dry. No rash noted. She is not diaphoretic. No erythema. No pallor.   Psychiatric: She has a normal mood and affect. Her behavior is normal. Judgment and thought content normal.   Vitals reviewed.      Lab Results   Component Value Date    HGBA1C 6.2 (H) 09/17/2017   \  Assessment:       1. Tobacco abuse    2. Mixed simple and mucopurulent chronic bronchitis    3. Type 2 diabetes mellitus with hyperglycemia, without long-term current use of insulin    4. Decubitus ulcer, infected, stage II    5. Lumbar degenerative disc disease    6. Spondylolisthesis of cervical region    7. Essential hypertension        Plan:       Tobacco abuse  -     Ambulatory referral to Smoking Cessation Program    Mixed simple and mucopurulent chronic bronchitis  -     fluticasone-vilanterol (BREO) 100-25 mcg/dose diskus inhaler; Inhale 1 puff into the lungs once daily. Controller  Dispense: 60 each; Refill: 4    Type 2 diabetes mellitus with hyperglycemia, without long-term current use of  insulin  -     Magnesium; Future; Expected date: 06/02/2018  -     Basic metabolic panel; Future; Expected date: 06/02/2018  -     TSH; Future; Expected date: 06/02/2018    Decubitus ulcer, infected, stage II  -     silver sulfADIAZINE 1% (SILVADENE) 1 % cream; Apply topically once daily.  Dispense: 50 g; Refill: 0    Lumbar degenerative disc disease  -     HYDROcodone-acetaminophen (NORCO)  mg per tablet; Take 1 tablet by mouth every 8 (eight) hours as needed for Pain (Do not take more than 3 a day. Do not mix with other narcotics.).  Dispense: 90 tablet; Refill: 0  -     clonazePAM (KLONOPIN) 1 MG tablet; Take 1 tablet (1 mg total) by mouth 2 (two) times daily as needed for Anxiety. Dose has to be taper down.  Dispense: 60 tablet; Refill: 3    Spondylolisthesis of cervical region  -     HYDROcodone-acetaminophen (NORCO)  mg per tablet; Take 1 tablet by mouth every 8 (eight) hours as needed for Pain (Do not take more than 3 a day. Do not mix with other narcotics.).  Dispense: 90 tablet; Refill: 0    Essential hypertension  -     Hypertension Digital Medicine (HDMP) Enrollment Order  -     Hypertension Digital Medicine (HDMP): Assign Onboarding Questionnaires      Patient readiness: acceptance and barriers:readiness, social stressors and environmental    During the course of the visit the patient was educated and counseled about the following:     Diabetes:  Discussed general issues about diabetes pathophysiology and management.  Hypertension:   Dietary sodium restriction.  Regular aerobic exercise.  Check blood pressures daily and record.  Obesity:   General weight loss/lifestyle modification strategies discussed (elicit support from others; identify saboteurs; non-food rewards, etc).  Regular aerobic exercise program discussed.    Goals: Diabetes: Maintain Hemoglobin A1C below 7, Hypertension: Reduce Blood Pressure and Obesity: Reduce calorie intake and BMI    Did patient meet goals/outcomes: No    The  following self management tools provided: blood pressure log  blood glucose log  excercise log    Patient Instructions (the written plan) was given to the patient/family.     Time spent with patient: 30 minutes

## 2018-06-13 ENCOUNTER — OFFICE VISIT (OUTPATIENT)
Dept: ORTHOPEDICS | Facility: CLINIC | Age: 68
End: 2018-06-13
Payer: MEDICARE

## 2018-06-13 ENCOUNTER — LAB VISIT (OUTPATIENT)
Dept: LAB | Facility: HOSPITAL | Age: 68
End: 2018-06-13
Attending: INTERNAL MEDICINE
Payer: MEDICARE

## 2018-06-13 VITALS — HEIGHT: 68 IN | BODY MASS INDEX: 43.64 KG/M2 | WEIGHT: 287.94 LBS

## 2018-06-13 DIAGNOSIS — E11.9 TYPE 2 DIABETES MELLITUS WITHOUT COMPLICATION: ICD-10-CM

## 2018-06-13 DIAGNOSIS — D50.9 IRON DEFICIENCY ANEMIA, UNSPECIFIED IRON DEFICIENCY ANEMIA TYPE: ICD-10-CM

## 2018-06-13 DIAGNOSIS — E11.8 TYPE 2 DIABETES MELLITUS WITH COMPLICATION, WITHOUT LONG-TERM CURRENT USE OF INSULIN: ICD-10-CM

## 2018-06-13 DIAGNOSIS — M17.0 PRIMARY OSTEOARTHRITIS OF BOTH KNEES: Primary | ICD-10-CM

## 2018-06-13 DIAGNOSIS — E11.65 TYPE 2 DIABETES MELLITUS WITH HYPERGLYCEMIA, WITHOUT LONG-TERM CURRENT USE OF INSULIN: ICD-10-CM

## 2018-06-13 LAB
ANION GAP SERPL CALC-SCNC: 10 MMOL/L
BASOPHILS # BLD AUTO: 0.1 K/UL
BASOPHILS NFR BLD: 0.5 %
BUN SERPL-MCNC: 21 MG/DL
CALCIUM SERPL-MCNC: 10.3 MG/DL
CHLORIDE SERPL-SCNC: 102 MMOL/L
CHOLEST SERPL-MCNC: 136 MG/DL
CHOLEST/HDLC SERPL: 3.5 {RATIO}
CO2 SERPL-SCNC: 30 MMOL/L
CREAT SERPL-MCNC: 0.8 MG/DL
DIFFERENTIAL METHOD: ABNORMAL
EOSINOPHIL # BLD AUTO: 0.4 K/UL
EOSINOPHIL NFR BLD: 2.9 %
ERYTHROCYTE [DISTWIDTH] IN BLOOD BY AUTOMATED COUNT: 16.5 %
EST. GFR  (AFRICAN AMERICAN): >60 ML/MIN/1.73 M^2
EST. GFR  (NON AFRICAN AMERICAN): >60 ML/MIN/1.73 M^2
ESTIMATED AVG GLUCOSE: 151 MG/DL
GLUCOSE SERPL-MCNC: 102 MG/DL
HBA1C MFR BLD HPLC: 6.9 %
HCT VFR BLD AUTO: 47.3 %
HDLC SERPL-MCNC: 39 MG/DL
HDLC SERPL: 28.7 %
HGB BLD-MCNC: 15.2 G/DL
IRON SERPL-MCNC: 93 UG/DL
LDLC SERPL CALC-MCNC: 69 MG/DL
LYMPHOCYTES # BLD AUTO: 2.2 K/UL
LYMPHOCYTES NFR BLD: 14.2 %
MAGNESIUM SERPL-MCNC: 2.1 MG/DL
MCH RBC QN AUTO: 28.4 PG
MCHC RBC AUTO-ENTMCNC: 32.2 G/DL
MCV RBC AUTO: 88 FL
MONOCYTES # BLD AUTO: 1 K/UL
MONOCYTES NFR BLD: 6.5 %
NEUTROPHILS # BLD AUTO: 11.8 K/UL
NEUTROPHILS NFR BLD: 75.9 %
NONHDLC SERPL-MCNC: 97 MG/DL
PLATELET # BLD AUTO: 257 K/UL
PMV BLD AUTO: 8.8 FL
POTASSIUM SERPL-SCNC: 4.9 MMOL/L
RBC # BLD AUTO: 5.36 M/UL
SATURATED IRON: 20 %
SODIUM SERPL-SCNC: 142 MMOL/L
TOTAL IRON BINDING CAPACITY: 460 UG/DL
TRANSFERRIN SERPL-MCNC: 311 MG/DL
TRIGL SERPL-MCNC: 140 MG/DL
TSH SERPL DL<=0.005 MIU/L-ACNC: 1.62 UIU/ML
WBC # BLD AUTO: 15.6 K/UL

## 2018-06-13 PROCEDURE — 99999 PR PBB SHADOW E&M-EST. PATIENT-LVL II: CPT | Mod: PBBFAC,,, | Performed by: ORTHOPAEDIC SURGERY

## 2018-06-13 PROCEDURE — 80048 BASIC METABOLIC PNL TOTAL CA: CPT

## 2018-06-13 PROCEDURE — 83540 ASSAY OF IRON: CPT

## 2018-06-13 PROCEDURE — 20610 DRAIN/INJ JOINT/BURSA W/O US: CPT | Mod: 50,S$GLB,, | Performed by: ORTHOPAEDIC SURGERY

## 2018-06-13 PROCEDURE — 83735 ASSAY OF MAGNESIUM: CPT

## 2018-06-13 PROCEDURE — 36415 COLL VENOUS BLD VENIPUNCTURE: CPT

## 2018-06-13 PROCEDURE — 99499 UNLISTED E&M SERVICE: CPT | Mod: S$GLB,,, | Performed by: ORTHOPAEDIC SURGERY

## 2018-06-13 PROCEDURE — 80061 LIPID PANEL: CPT

## 2018-06-13 PROCEDURE — 83036 HEMOGLOBIN GLYCOSYLATED A1C: CPT

## 2018-06-13 PROCEDURE — 84443 ASSAY THYROID STIM HORMONE: CPT

## 2018-06-13 PROCEDURE — 85025 COMPLETE CBC W/AUTO DIFF WBC: CPT

## 2018-06-14 ENCOUNTER — OFFICE VISIT (OUTPATIENT)
Dept: HEMATOLOGY/ONCOLOGY | Facility: CLINIC | Age: 68
End: 2018-06-14
Payer: MEDICARE

## 2018-06-14 VITALS
TEMPERATURE: 98 F | BODY MASS INDEX: 44.41 KG/M2 | RESPIRATION RATE: 19 BRPM | SYSTOLIC BLOOD PRESSURE: 157 MMHG | WEIGHT: 293 LBS | DIASTOLIC BLOOD PRESSURE: 105 MMHG | HEIGHT: 68 IN | HEART RATE: 82 BPM

## 2018-06-14 DIAGNOSIS — E66.9 OBESITY, UNSPECIFIED CLASSIFICATION, UNSPECIFIED OBESITY TYPE, UNSPECIFIED WHETHER SERIOUS COMORBIDITY PRESENT: ICD-10-CM

## 2018-06-14 DIAGNOSIS — S31.109A WOUND OF ABDOMEN: ICD-10-CM

## 2018-06-14 DIAGNOSIS — Z86.2 HISTORY OF ANEMIA: Primary | ICD-10-CM

## 2018-06-14 DIAGNOSIS — D72.829 LEUKOCYTOSIS, UNSPECIFIED TYPE: ICD-10-CM

## 2018-06-14 PROCEDURE — 3080F DIAST BP >= 90 MM HG: CPT | Mod: CPTII,S$GLB,, | Performed by: INTERNAL MEDICINE

## 2018-06-14 PROCEDURE — 99999 PR PBB SHADOW E&M-EST. PATIENT-LVL III: CPT | Mod: PBBFAC,,, | Performed by: INTERNAL MEDICINE

## 2018-06-14 PROCEDURE — 3077F SYST BP >= 140 MM HG: CPT | Mod: CPTII,S$GLB,, | Performed by: INTERNAL MEDICINE

## 2018-06-14 PROCEDURE — 99213 OFFICE O/P EST LOW 20 MIN: CPT | Mod: S$GLB,,, | Performed by: INTERNAL MEDICINE

## 2018-06-14 PROCEDURE — 99499 UNLISTED E&M SERVICE: CPT | Mod: S$PBB,,, | Performed by: INTERNAL MEDICINE

## 2018-06-14 RX ORDER — COLLAGENASE SANTYL 250 [ARB'U]/G
OINTMENT TOPICAL
Refills: 0 | OUTPATIENT
Start: 2018-06-14

## 2018-06-14 NOTE — PROGRESS NOTES
Past Medical History:   Diagnosis Date    *Atrial flutter     Angina pectoris 2017    Anxiety     Arthritis     Asthma     Atrial fibrillation     Back pain     Cataract     OD    CHF (congestive heart failure)     COPD (chronic obstructive pulmonary disease)     Depression     Diabetes mellitus     Emphysema of lung     Heart failure     Hepatomegaly 2/3/2016    Hernia     History of MI (myocardial infarction) 2016    Hypercapnic respiratory failure, chronic 2016    Hyperlipidemia     Hypertension     Iron deficiency anemia 2/3/2016    Myocardial infarction     Obesity     Peripheral vascular disease     Pneumonia     Polyneuropathy     Retinal detachment     OS    Septic shock 2017    Skin ulcer 3/18/2017    Tobacco dependence     Type II 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, not stated as uncontrolled(250.60)        Past Surgical History:   Procedure Laterality Date    BREAST BIOPSY Left 10 yrs ago    benign    CATARACT EXTRACTION Left     OS      SECTION      x2    EYE SURGERY      HYSTERECTOMY      partial    OOPHORECTOMY      one ovary conserved    RETINAL DETACHMENT SURGERY      buckle --OS    TONSILLECTOMY         Current Outpatient Prescriptions   Medication Sig    albuterol (ACCUNEB) 0.63 mg/3 mL Nebu     albuterol (VENTOLIN HFA) 90 mcg/actuation inhaler Inhale 2 puffs into the lungs every 6 (six) hours as needed. Rescue    apixaban (ELIQUIS) 5 mg Tab Take 1 tablet (5 mg total) by mouth 2 (two) times daily.    ascorbic acid, vitamin C, (VITAMIN C) 500 MG tablet Take 1 tablet (500 mg total) by mouth every evening.    aspirin (ECOTRIN) 81 MG EC tablet Take 81 mg by mouth once daily.    atorvastatin (LIPITOR) 20 MG tablet TAKE 1 TABLET (20 MG TOTAL) BY MOUTH ONCE DAILY.    benzocaine 20 % Oint Apply 1 application topically every 6 (six) hours as needed.    blood sugar diagnostic (TRUE METRIX GLUCOSE  TEST STRIP) Strp Use to test blood sugar three times a day and Provide lancets too.  DX: E11.40    blood-glucose meter (TRUE METRIX AIR GLUCOSE METER) kit Use as instructed to test blood sugar   DX: E11.40    clonazePAM (KLONOPIN) 1 MG tablet Take 1 tablet (1 mg total) by mouth 2 (two) times daily as needed for Anxiety. Dose has to be taper down.    collagenase (SANTYL) ointment Apply topically once daily.    fluticasone-vilanterol (BREO) 100-25 mcg/dose diskus inhaler Inhale 1 puff into the lungs once daily. Controller    furosemide (LASIX) 40 MG tablet TAKE ONE TABLET TWICE A DAYFOR FLUID OVERLOAD    gabapentin (NEURONTIN) 800 MG tablet Take 1 tablet (800 mg total) by mouth 3 (three) times daily.    HYDROcodone-acetaminophen (NORCO)  mg per tablet Take 1 tablet by mouth every 8 (eight) hours as needed for Pain (Do not take more than 3 a day. Do not mix with other narcotics.).    KLOR-CON M20 20 mEq tablet TAKE ONE TABLET BY MOUTH ONCE DAILY    levalbuterol (XOPENEX) 0.63 mg/3 mL nebulizer solution INHALE THE CONTENTS OF 1 VIAL BY NEBULIZATION 4 times  DAILY.    DX: J43.9    lisinopril (PRINIVIL,ZESTRIL) 20 MG tablet TAKE 1 TABLET EVERY DAY    metFORMIN (GLUCOPHAGE) 500 MG tablet Take 1 tablet (500 mg total) by mouth 2 (two) times daily with meals.    metoprolol succinate (TOPROL-XL) 25 MG 24 hr tablet TAKE 1/2 TABLET EVERY DAY    multivitamin (THERAGRAN) tablet Take 1 tablet by mouth once daily.    nystatin (MYCOSTATIN) cream Apply topically 2 (two) times daily.    nystatin (MYCOSTATIN) powder Apply topically 4 (four) times daily.    nystatin-triamcinolone (MYCOLOG II) cream APPLY SPARINGLY TO AFFECTED AREA(S) 3 TIMES DAILY AS NEEDED    polyethylene glycol (GLYCOLAX) 17 gram/dose powder Take 17 g by mouth once daily.    silver sulfADIAZINE 1% (SILVADENE) 1 % cream Apply topically once daily.    spironolactone (ALDACTONE) 25 MG tablet Take 1 tablet (25 mg total) by mouth once daily.     traZODone (DESYREL) 150 MG tablet TAKE 1 TABLET EVERY EVENING.    TRUEPLUS LANCETS 33 gauge Misc USE THREE TIMES DAILY TO TEST BLOOD SUGAR     No current facility-administered medications for this visit.        Allergies   Allergen Reactions    Dilaudid [Hydromorphone] Anaphylaxis     Other reaction(s): Anaphylaxis  Other reaction(s): Unknown       Family History   Problem Relation Age of Onset    Arthritis Father     Heart disease Father     Early death Sister 30        heart disease    Heart disease Sister 32        MI    No Known Problems Brother     No Known Problems Daughter     Blindness Son         due to accident//    Arthritis Sister     Heart disease Sister     Crohn's disease Sister     Cancer Brother         asbestosis    Alcohol abuse Mother     Breast cancer Neg Hx     Glaucoma Neg Hx     Macular degeneration Neg Hx     Retinal detachment Neg Hx     Amblyopia Neg Hx     Diabetes Neg Hx     Cataracts Neg Hx     Hypertension Neg Hx     Strabismus Neg Hx     Stroke Neg Hx     Thyroid disease Neg Hx        Social History     Social History    Marital status:      Spouse name: N/A    Number of children: N/A    Years of education: N/A     Occupational History    Not on file.     Social History Main Topics    Smoking status: Current Every Day Smoker     Packs/day: 0.25     Years: 50.00     Types: Cigarettes     Start date: 1/19/1968    Smokeless tobacco: Never Used    Alcohol use No    Drug use: No    Sexual activity: Not Currently     Other Topics Concern    Not on file     Social History Narrative    No narrative on file       Chief Complaint:   Chief Complaint   Patient presents with    Injections     EUFLEXXA 1/3 BILATERAL KNEE       Consulting Physician: No ref. provider found    History of present illness: This is a 64-year-old young lady who reports bilateral knee pain.  Pain today 7/10. No new injury.    Review of Systems:    Constitution: Denies chills,  fever, and sweats.    HENT: Denies headaches. Reports blurry vision.    Cardiovascular: Denies chest pain or irregular heart beat.    Respiratory: Denies cough. Reports shortness of breath.    Gastrointestinal: Denies abdominal pain, nausea, or vomiting.    Musculoskeletal:  Denies muscle cramps.    Neurological: Denies dizziness or focal weakness.    Psychiatric/Behavioral: Normal mental status. Anxiety.    Hematologic/Lymphatic: Denies bleeding problem or easy bruising/bleeding.    Skin: Denies rash or suspicious lesions.      Examination:    Vital Signs:    There were no vitals filed for this visit.    Body mass index is 43.78 kg/m².    This a well-developed, well nourished patient in no acute distress.    Alert and oriented and cooperative to examination.       Physical Exam: Left Knee Exam    Gait   antalgic    Skin  Rash:   None  Scars:   None    Inspection  Erythema:  None  Ecchymosis:  None  Effusion:  Mild  Masses:  None  Lymphadenopathy: None    Range of Motion: Full - 0 to 130°    Medial Joint : Mild  Lateral Joint : Mild    Patellofemoral Tenderness: None  Patellofemoral Crepitus: None    Lachman:  Normal  Anterior Drawer: Normal  Posterior Drawer: Normal    Dilia's:  Negative  Apley's:  Negative    Varus Stress:  Stable  Valgus Stress: Stable    Strength:  5/5    Pulses:  Palpable distal    Sensation:  Intact      Right Knee Exam    Gait:   Antalgic    Skin  Rash:   None  Scars:   None    Inspection  Erythema:  None  Ecchymosis:  None  Effusion:  Mild  Masses:  None  Lymphadenopathy: None    Range of Motion: Full - 0 to 130°    Medial Joint : Mild  Lateral Joint : Mild    Patellofemoral Tenderness: None  Patellofemoral Crepitus: None    Lachman:  Normal  Anterior Drawer: Normal  Posterior Drawer: Normal    Dilia's:  Negative  Apley's:  Negative    Varus Stress:  Stable  Valgus Stress: Stable    Strength:  5/5    Pulses:  Palpable  distal    Sensation:  Intact          Imaging: X-rays of both knees reveals some degenerative changes that are moderate.     Assessment: Primary osteoarthritis of both knees  -     Large Joint Aspiration/Injection        Plan:  Euflexxa series    DISCLAIMER: This note may have been dictated using voice recognition software and may contain grammatical errors. Report sent to referring provider as required.

## 2018-06-14 NOTE — PROCEDURES
Large Joint Aspiration/Injection  Date/Time: 6/13/2018 9:31 PM  Performed by: DEE WILLIAMSON  Authorized by: DEE WILLIAMSON     Consent Done?:  Yes (Verbal)  Indications:  Pain  Timeout: Prior to procedure the correct patient, procedure, and site was verified      Location:  Knee  Site:  R knee and L knee  Prep: Patient was prepped and draped in usual sterile fashion    Approach:  Anteromedial  Medications:  20 mg sodium hyaluronate (EUFLEXXA) 10 mg/mL(mw 2.4 -3.6 million); 20 mg sodium hyaluronate (EUFLEXXA) 10 mg/mL(mw 2.4 -3.6 million)

## 2018-06-14 NOTE — PROGRESS NOTES
"FOLLOWUP    CHIEF COMPLAINT:I am here for lab results     Ms. Tian is a 67-year-old female who has a history of progressive anemia after    IV iron.  She is continuing to see the wound doctor DR Conroy for ulcers on her left hip   Here today for routine labs for anemia  She is taking therapy for the " rash "    She is tolerating Glucophage for diabetes as well as Lexapro for depression and Zestril   for hypertension  Trazodone helping her sleep    Past Medical History:   Diagnosis Date    *Atrial flutter     Angina pectoris 9/18/2017    Anxiety     Arthritis     Asthma     Atrial fibrillation     Back pain     Cataract     OD    CHF (congestive heart failure)     COPD (chronic obstructive pulmonary disease)     Depression     Diabetes mellitus     Emphysema of lung     Heart failure     Hepatomegaly 2/3/2016    Hernia     History of MI (myocardial infarction) 1/19/2016    Hypercapnic respiratory failure, chronic 11/16/2016    Hyperlipidemia     Hypertension     Iron deficiency anemia 2/3/2016    Myocardial infarction     Obesity     Peripheral vascular disease     Pneumonia     Polyneuropathy     Retinal detachment     OS    Septic shock 4/23/2017    Skin ulcer 3/18/2017    Tobacco dependence     Type II 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, not stated as uncontrolled(250.60)          Current Outpatient Prescriptions:     albuterol (ACCUNEB) 0.63 mg/3 mL Nebu, , Disp: , Rfl:     albuterol (VENTOLIN HFA) 90 mcg/actuation inhaler, Inhale 2 puffs into the lungs every 6 (six) hours as needed. Rescue, Disp: 18 g, Rfl: 0    apixaban (ELIQUIS) 5 mg Tab, Take 1 tablet (5 mg total) by mouth 2 (two) times daily., Disp: 90 tablet, Rfl: 3    ascorbic acid, vitamin C, (VITAMIN C) 500 MG tablet, Take 1 tablet (500 mg total) by mouth every evening., Disp: , Rfl:     aspirin (ECOTRIN) 81 MG EC tablet, Take 81 mg by mouth once daily., Disp: , Rfl:     " atorvastatin (LIPITOR) 20 MG tablet, TAKE 1 TABLET (20 MG TOTAL) BY MOUTH ONCE DAILY., Disp: 90 tablet, Rfl: 3    benzocaine 20 % Oint, Apply 1 application topically every 6 (six) hours as needed., Disp: 28 g, Rfl: 2    blood sugar diagnostic (TRUE METRIX GLUCOSE TEST STRIP) Strp, Use to test blood sugar three times a day and Provide lancets too.  DX: E11.40, Disp: 300 each, Rfl: 3    blood-glucose meter (TRUE METRIX AIR GLUCOSE METER) kit, Use as instructed to test blood sugar   DX: E11.40, Disp: 1 each, Rfl: 0    clonazePAM (KLONOPIN) 1 MG tablet, Take 1 tablet (1 mg total) by mouth 2 (two) times daily as needed for Anxiety. Dose has to be taper down., Disp: 60 tablet, Rfl: 3    collagenase (SANTYL) ointment, Apply topically once daily., Disp: 30 g, Rfl: 0    fluticasone-vilanterol (BREO) 100-25 mcg/dose diskus inhaler, Inhale 1 puff into the lungs once daily. Controller, Disp: 60 each, Rfl: 4    furosemide (LASIX) 40 MG tablet, TAKE ONE TABLET TWICE A DAYFOR FLUID OVERLOAD, Disp: 60 tablet, Rfl: 5    gabapentin (NEURONTIN) 800 MG tablet, Take 1 tablet (800 mg total) by mouth 3 (three) times daily., Disp: 270 tablet, Rfl: 3    HYDROcodone-acetaminophen (NORCO)  mg per tablet, Take 1 tablet by mouth every 8 (eight) hours as needed for Pain (Do not take more than 3 a day. Do not mix with other narcotics.)., Disp: 90 tablet, Rfl: 0    KLOR-CON M20 20 mEq tablet, TAKE ONE TABLET BY MOUTH ONCE DAILY, Disp: 30 tablet, Rfl: 6    levalbuterol (XOPENEX) 0.63 mg/3 mL nebulizer solution, INHALE THE CONTENTS OF 1 VIAL BY NEBULIZATION 4 times  DAILY.    DX: J43.9, Disp: 792 mL, Rfl: 3    lisinopril (PRINIVIL,ZESTRIL) 20 MG tablet, TAKE 1 TABLET EVERY DAY, Disp: 90 tablet, Rfl: 1    metFORMIN (GLUCOPHAGE) 500 MG tablet, Take 1 tablet (500 mg total) by mouth 2 (two) times daily with meals., Disp: 180 tablet, Rfl: 3    metoprolol succinate (TOPROL-XL) 25 MG 24 hr tablet, TAKE 1/2 TABLET EVERY DAY, Disp: 45  "tablet, Rfl: 3    multivitamin (THERAGRAN) tablet, Take 1 tablet by mouth once daily., Disp: , Rfl:     nystatin (MYCOSTATIN) cream, Apply topically 2 (two) times daily., Disp: 30 g, Rfl: 4    nystatin (MYCOSTATIN) powder, Apply topically 4 (four) times daily., Disp: 120 g, Rfl: 3    nystatin-triamcinolone (MYCOLOG II) cream, APPLY SPARINGLY TO AFFECTED AREA(S) 3 TIMES DAILY AS NEEDED, Disp: 30 g, Rfl: 1    polyethylene glycol (GLYCOLAX) 17 gram/dose powder, Take 17 g by mouth once daily., Disp: 1530 g, Rfl: 3    silver sulfADIAZINE 1% (SILVADENE) 1 % cream, Apply topically once daily., Disp: 50 g, Rfl: 0    spironolactone (ALDACTONE) 25 MG tablet, Take 1 tablet (25 mg total) by mouth once daily., Disp: 30 tablet, Rfl: 11    traZODone (DESYREL) 150 MG tablet, TAKE 1 TABLET EVERY EVENING., Disp: 90 tablet, Rfl: 3    TRUEPLUS LANCETS 33 gauge Misc, USE THREE TIMES DAILY TO TEST BLOOD SUGAR, Disp: 300 each, Rfl: 3  No current facility-administered medications for this visit.     REVIEW OF SYSTEMS:  GENERAL:  No progression of fatigue nor SOB  She is obese.  Difficulty   walking, extreme knee pain.  Gait instability only due to knees    HEENT:  No photophobia, rhinorrhea  RESPIRATORY:  nocough no wheezing.positive congestion  CARDIOVASCULAR:  No chest pain, palpitations  GASTROINTESTINAL:  No abdominal pain, dysphagia, emesis, diarrhea, or   constipation.    GENITOURINARY:  No urinary frequency, hesitancy  RHEUM:  no arthralgias or joint swelling.  MUSCOLSKELETAL:  No neck pain, back pain, positive  arthralgias  NEUROLOGICAL:  No headaches, positive dizziness, + paresthesias  PSYCH:  No agitation, change in behavior, or anxiety.  ENDOCRINE:  No hot or cold intolerance.    PHYSICAL EXAMINATION:  BP (!) 157/105   Pulse 82   Temp 97.9 °F (36.6 °C) (Oral)   Resp 19   Ht 5' 8" (1.727 m)   Wt 134 kg (295 lb 6.7 oz)   BMI 44.92 kg/m²     GENERAL:  She is obese.  She is oriented, well developed, well " nourished.  PSYCH:  Pleasant affect.  No anxiety or depression.  HEENT:  Normocephalic.  Lids intact, conjunctivae pink.  Sinuses nontender to   palpation.  OP clear, no palatal pallor.  NECK:  Supple.  Trachea midline.  No palpable abnormalities.  CHEST:  No use of accessory muscles during respiration. Decreased Breath sounds bases  CV RRR  ABDOMEN:  NT, ND.  Normal bowel sounds.  No palpable HSM or mass.  EXTREMITIES:  Legs, 1+ pitting edema.  Evidence of chronic venous insufficiency stable  NEUROLOGICAL:  Patient is having trouble even ambulating today with her walker due to severe back pain.  She has decreased strength in her legs.  She is having a stop and catch her breath because of the pain.  She is on hydrocodone for pain and is asking if I can write her for something stronger  SKIN:  Warm, dry.  Ecchymosis evident.  No tenting, petechiae    SHe just had injection in her knees : not steroids   LABS:      Lab Results   Component Value Date    WBC 15.60 (H) 06/13/2018    HGB 15.2 06/13/2018    HCT 47.3 06/13/2018    MCV 88 06/13/2018     06/13/2018     Iron and TIBC   Order: [id number]   Status:  Final result   Visible to patient:  Yes (Patient Portal) Next appt:  05/21/2018 at 05:20 PM in Family Medicine (Constantine Bird MD) Dx:  History of anemia     Ref Range & Units 2d ago 2mo ago    Iron 30 - 160 ug/dL 56  51     Transferrin 200 - 375 mg/dL 317  324     TIBC 250 - 450 ug/dL 469   480      Saturated Iron 20 - 50 % 12   11     Resulting Agency  [facility]      Specimen Collected: 03/12/18 14:04 Last Resulted: 03/13/18 01:02 Lab Flowsheet Order Details View Encounter Lab and Collection Details                 IMPRESSION AND PLAN:        History of anemia  -     CBC auto differential; Future; Expected date: 06/14/2018  -     Iron and TIBC; Future; Expected date: 06/14/2018    Obesity, unspecified classification, unspecified obesity type, unspecified whether serious comorbidity present    Leukocytosis,  unspecified type         NO need for Iv iron   Pt with leukocytosis suggesting infection  She must discuss this with her wound doctor since he is treating her wounds  No further anemia   Pt refusing to see a pain specialist   Abstain from fatty foods to help with liver regeneration   RTC 3 months with iron levels and cbc   No need for IV iron at this time  Pt is in scooter because of difficulty with walking long distances  Cont lipitor to prevent plaques  Cont lasix to help with edema  Watch for peripheral destruction of cells with hepatomegaly  RTC 4 months     Current Outpatient Prescriptions:     albuterol (ACCUNEB) 0.63 mg/3 mL Nebu, , Disp: , Rfl:     albuterol (VENTOLIN HFA) 90 mcg/actuation inhaler, Inhale 2 puffs into the lungs every 6 (six) hours as needed. Rescue, Disp: 18 g, Rfl: 0    apixaban (ELIQUIS) 5 mg Tab, Take 1 tablet (5 mg total) by mouth 2 (two) times daily., Disp: 90 tablet, Rfl: 3    ascorbic acid, vitamin C, (VITAMIN C) 500 MG tablet, Take 1 tablet (500 mg total) by mouth every evening., Disp: , Rfl:     aspirin (ECOTRIN) 81 MG EC tablet, Take 81 mg by mouth once daily., Disp: , Rfl:     atorvastatin (LIPITOR) 20 MG tablet, TAKE 1 TABLET (20 MG TOTAL) BY MOUTH ONCE DAILY., Disp: 90 tablet, Rfl: 3    benzocaine 20 % Oint, Apply 1 application topically every 6 (six) hours as needed., Disp: 28 g, Rfl: 2    blood sugar diagnostic (TRUE METRIX GLUCOSE TEST STRIP) Strp, Use to test blood sugar three times a day and Provide lancets too.  DX: E11.40, Disp: 300 each, Rfl: 3    blood-glucose meter (TRUE METRIX AIR GLUCOSE METER) kit, Use as instructed to test blood sugar   DX: E11.40, Disp: 1 each, Rfl: 0    clonazePAM (KLONOPIN) 1 MG tablet, Take 1 tablet (1 mg total) by mouth 2 (two) times daily as needed for Anxiety. Dose has to be taper down., Disp: 60 tablet, Rfl: 3    collagenase (SANTYL) ointment, Apply topically once daily., Disp: 30 g, Rfl: 0    fluticasone-vilanterol (BREO)  100-25 mcg/dose diskus inhaler, Inhale 1 puff into the lungs once daily. Controller, Disp: 60 each, Rfl: 4    furosemide (LASIX) 40 MG tablet, TAKE ONE TABLET TWICE A DAYFOR FLUID OVERLOAD, Disp: 60 tablet, Rfl: 5    gabapentin (NEURONTIN) 800 MG tablet, Take 1 tablet (800 mg total) by mouth 3 (three) times daily., Disp: 270 tablet, Rfl: 3    HYDROcodone-acetaminophen (NORCO)  mg per tablet, Take 1 tablet by mouth every 8 (eight) hours as needed for Pain (Do not take more than 3 a day. Do not mix with other narcotics.)., Disp: 90 tablet, Rfl: 0    KLOR-CON M20 20 mEq tablet, TAKE ONE TABLET BY MOUTH ONCE DAILY, Disp: 30 tablet, Rfl: 6    levalbuterol (XOPENEX) 0.63 mg/3 mL nebulizer solution, INHALE THE CONTENTS OF 1 VIAL BY NEBULIZATION 4 times  DAILY.    DX: J43.9, Disp: 792 mL, Rfl: 3    lisinopril (PRINIVIL,ZESTRIL) 20 MG tablet, TAKE 1 TABLET EVERY DAY, Disp: 90 tablet, Rfl: 1    metFORMIN (GLUCOPHAGE) 500 MG tablet, Take 1 tablet (500 mg total) by mouth 2 (two) times daily with meals., Disp: 180 tablet, Rfl: 3    metoprolol succinate (TOPROL-XL) 25 MG 24 hr tablet, TAKE 1/2 TABLET EVERY DAY, Disp: 45 tablet, Rfl: 3    multivitamin (THERAGRAN) tablet, Take 1 tablet by mouth once daily., Disp: , Rfl:     nystatin (MYCOSTATIN) cream, Apply topically 2 (two) times daily., Disp: 30 g, Rfl: 4    nystatin (MYCOSTATIN) powder, Apply topically 4 (four) times daily., Disp: 120 g, Rfl: 3    nystatin-triamcinolone (MYCOLOG II) cream, APPLY SPARINGLY TO AFFECTED AREA(S) 3 TIMES DAILY AS NEEDED, Disp: 30 g, Rfl: 1    polyethylene glycol (GLYCOLAX) 17 gram/dose powder, Take 17 g by mouth once daily., Disp: 1530 g, Rfl: 3    silver sulfADIAZINE 1% (SILVADENE) 1 % cream, Apply topically once daily., Disp: 50 g, Rfl: 0    spironolactone (ALDACTONE) 25 MG tablet, Take 1 tablet (25 mg total) by mouth once daily., Disp: 30 tablet, Rfl: 11    traZODone (DESYREL) 150 MG tablet, TAKE 1 TABLET EVERY EVENING.,  Disp: 90 tablet, Rfl: 3    TRUEPLUS LANCETS 33 gauge Misc, USE THREE TIMES DAILY TO TEST BLOOD SUGAR, Disp: 300 each, Rfl: 3  No current facility-administered medications for this visit.       She is to continue Coumadin for chronic atrial fibrillation as well as her diabetic medication to help control hyperglycemia due to diabetes

## 2018-06-16 ENCOUNTER — TELEPHONE (OUTPATIENT)
Dept: ADMINISTRATIVE | Facility: CLINIC | Age: 68
End: 2018-06-16

## 2018-06-16 NOTE — PATIENT INSTRUCTIONS
Home Health SOC 05/29/2018 to 07/27/2018 [facility]  Dr Constantine Bird:  Services.

## 2018-06-17 ENCOUNTER — PATIENT MESSAGE (OUTPATIENT)
Dept: FAMILY MEDICINE | Facility: CLINIC | Age: 68
End: 2018-06-17

## 2018-06-18 ENCOUNTER — PATIENT MESSAGE (OUTPATIENT)
Dept: FAMILY MEDICINE | Facility: CLINIC | Age: 68
End: 2018-06-18

## 2018-06-18 DIAGNOSIS — S31.109A WOUND OF ABDOMEN: ICD-10-CM

## 2018-06-18 NOTE — TELEPHONE ENCOUNTER
Pt states wound care still wants her to use the santyl ointment,  Wounds are still open and draining

## 2018-06-18 NOTE — TELEPHONE ENCOUNTER
She was referred to Wound Care.  Do they want her to continue the Santyl?  I do not want to impair healing.

## 2018-06-19 ENCOUNTER — OFFICE VISIT (OUTPATIENT)
Dept: ORTHOPEDICS | Facility: CLINIC | Age: 68
End: 2018-06-19
Payer: MEDICARE

## 2018-06-19 ENCOUNTER — HOSPITAL ENCOUNTER (EMERGENCY)
Facility: HOSPITAL | Age: 68
Discharge: HOME | End: 2018-06-19
Attending: EMERGENCY MEDICINE
Payer: MEDICARE

## 2018-06-19 VITALS
SYSTOLIC BLOOD PRESSURE: 116 MMHG | HEART RATE: 68 BPM | DIASTOLIC BLOOD PRESSURE: 54 MMHG | WEIGHT: 288 LBS | TEMPERATURE: 99 F | BODY MASS INDEX: 43.65 KG/M2 | OXYGEN SATURATION: 97 % | RESPIRATION RATE: 19 BRPM | HEIGHT: 68 IN

## 2018-06-19 VITALS — BODY MASS INDEX: 44.41 KG/M2 | WEIGHT: 293 LBS | HEIGHT: 68 IN

## 2018-06-19 DIAGNOSIS — B34.9 VIRAL SYNDROME: Primary | ICD-10-CM

## 2018-06-19 DIAGNOSIS — M17.0 PRIMARY OSTEOARTHRITIS OF BOTH KNEES: Primary | ICD-10-CM

## 2018-06-19 DIAGNOSIS — R52 BODY ACHES: ICD-10-CM

## 2018-06-19 LAB
ALBUMIN SERPL BCP-MCNC: 3 G/DL
ALP SERPL-CCNC: 78 U/L
ALT SERPL W/O P-5'-P-CCNC: 19 U/L
ANION GAP SERPL CALC-SCNC: 12 MMOL/L
ANISOCYTOSIS BLD QL SMEAR: SLIGHT
AST SERPL-CCNC: 18 U/L
BASOPHILS # BLD AUTO: ABNORMAL K/UL
BASOPHILS NFR BLD: 0 %
BILIRUB SERPL-MCNC: 0.9 MG/DL
BILIRUB UR QL STRIP: NEGATIVE
BUN SERPL-MCNC: 17 MG/DL
CALCIUM SERPL-MCNC: 10 MG/DL
CHLORIDE SERPL-SCNC: 98 MMOL/L
CLARITY UR: CLEAR
CO2 SERPL-SCNC: 27 MMOL/L
COLOR UR: YELLOW
CREAT SERPL-MCNC: 0.9 MG/DL
DIFFERENTIAL METHOD: ABNORMAL
EOSINOPHIL # BLD AUTO: ABNORMAL K/UL
EOSINOPHIL NFR BLD: 1 %
ERYTHROCYTE [DISTWIDTH] IN BLOOD BY AUTOMATED COUNT: 16.5 %
EST. GFR  (AFRICAN AMERICAN): >60 ML/MIN/1.73 M^2
EST. GFR  (NON AFRICAN AMERICAN): >60 ML/MIN/1.73 M^2
GLUCOSE SERPL-MCNC: 121 MG/DL
GLUCOSE UR QL STRIP: NEGATIVE
HCT VFR BLD AUTO: 39.9 %
HGB BLD-MCNC: 12.9 G/DL
HGB UR QL STRIP: NEGATIVE
KETONES UR QL STRIP: NEGATIVE
LEUKOCYTE ESTERASE UR QL STRIP: NEGATIVE
LYMPHOCYTES # BLD AUTO: ABNORMAL K/UL
LYMPHOCYTES NFR BLD: 14 %
MAGNESIUM SERPL-MCNC: 1.9 MG/DL
MCH RBC QN AUTO: 28.7 PG
MCHC RBC AUTO-ENTMCNC: 32.5 G/DL
MCV RBC AUTO: 89 FL
MONOCYTES # BLD AUTO: ABNORMAL K/UL
MONOCYTES NFR BLD: 9 %
NEUTROPHILS NFR BLD: 73 %
NEUTS BAND NFR BLD MANUAL: 3 %
NITRITE UR QL STRIP: NEGATIVE
PH UR STRIP: 6 [PH] (ref 5–8)
PLATELET # BLD AUTO: 231 K/UL
PLATELET BLD QL SMEAR: ABNORMAL
PMV BLD AUTO: 8.7 FL
POTASSIUM SERPL-SCNC: 4.5 MMOL/L
PROT SERPL-MCNC: 7.1 G/DL
PROT UR QL STRIP: NEGATIVE
RBC # BLD AUTO: 4.51 M/UL
SODIUM SERPL-SCNC: 137 MMOL/L
SP GR UR STRIP: 1.01 (ref 1–1.03)
URN SPEC COLLECT METH UR: NORMAL
UROBILINOGEN UR STRIP-ACNC: NEGATIVE EU/DL
WBC # BLD AUTO: 22 K/UL

## 2018-06-19 PROCEDURE — 80053 COMPREHEN METABOLIC PANEL: CPT

## 2018-06-19 PROCEDURE — 99499 UNLISTED E&M SERVICE: CPT | Mod: S$GLB,,, | Performed by: ORTHOPAEDIC SURGERY

## 2018-06-19 PROCEDURE — 36415 COLL VENOUS BLD VENIPUNCTURE: CPT

## 2018-06-19 PROCEDURE — 94640 AIRWAY INHALATION TREATMENT: CPT

## 2018-06-19 PROCEDURE — 85007 BL SMEAR W/DIFF WBC COUNT: CPT

## 2018-06-19 PROCEDURE — 85027 COMPLETE CBC AUTOMATED: CPT

## 2018-06-19 PROCEDURE — 25000242 PHARM REV CODE 250 ALT 637 W/ HCPCS: Performed by: EMERGENCY MEDICINE

## 2018-06-19 PROCEDURE — 20610 DRAIN/INJ JOINT/BURSA W/O US: CPT | Mod: 50,S$GLB,, | Performed by: ORTHOPAEDIC SURGERY

## 2018-06-19 PROCEDURE — 99284 EMERGENCY DEPT VISIT MOD MDM: CPT | Mod: 25

## 2018-06-19 PROCEDURE — 99999 PR PBB SHADOW E&M-EST. PATIENT-LVL II: CPT | Mod: PBBFAC,,, | Performed by: ORTHOPAEDIC SURGERY

## 2018-06-19 PROCEDURE — 83735 ASSAY OF MAGNESIUM: CPT

## 2018-06-19 PROCEDURE — 81003 URINALYSIS AUTO W/O SCOPE: CPT

## 2018-06-19 RX ORDER — ALBUTEROL SULFATE 2.5 MG/.5ML
2.5 SOLUTION RESPIRATORY (INHALATION)
Status: COMPLETED | OUTPATIENT
Start: 2018-06-19 | End: 2018-06-19

## 2018-06-19 RX ADMIN — ALBUTEROL SULFATE 2.5 MG: 2.5 SOLUTION RESPIRATORY (INHALATION) at 08:06

## 2018-06-19 NOTE — ED PROVIDER NOTES
Encounter Date: 6/19/2018    SCRIBE #1 NOTE: I, Keren Lyons , am scribing for, and in the presence of, Dr. Greenberg .       History     Chief Complaint   Patient presents with    Generalized Body Aches     x 3 days    Fever     x 3 days    Fatigue     x 3 days    Dizziness     06/19/2018 6:32 PM     Chief complaint: Weakness     Nelly Tian is a 67 y.o. female who has a past medical history of  Anxiety; Atrial fibrillation; CHF ; Depression; Diabetes mellitus; Hepatomegaly (2/3/2016) History of MI  (1/19/2016); Hypercapnic respiratory failure, ; Hyperlipidemia; Hypertension; and Peripheral vascular disease.    The patient presents to the ED via EMS with complaints of dizziness, light headedness, headache and weakness with an onset x 3 days PTA. The patient reports that she had relief with half of a Klonopin upon arrival to the ED. She relays that she is supposed to take 2 per day, but usually only takes 1/2 of one pill. She reports that despite the fact that she feels better, she still wants to be evaluated.She presents with no other acute complaints. She denies any change in appetite.         The history is provided by the patient.     Review of patient's allergies indicates:   Allergen Reactions    Dilaudid [hydromorphone] Anaphylaxis     Other reaction(s): Anaphylaxis  Other reaction(s): Unknown     Past Medical History:   Diagnosis Date    *Atrial flutter     Angina pectoris 9/18/2017    Anxiety     Arthritis     Asthma     Atrial fibrillation     Back pain     Cataract     OD    CHF (congestive heart failure)     COPD (chronic obstructive pulmonary disease)     Depression     Diabetes mellitus     Emphysema of lung     Heart failure     Hepatomegaly 2/3/2016    Hernia     History of MI (myocardial infarction) 1/19/2016    Hypercapnic respiratory failure, chronic 11/16/2016    Hyperlipidemia     Hypertension     Iron deficiency anemia 2/3/2016    Myocardial infarction      Obesity     Peripheral vascular disease     Pneumonia     Polyneuropathy     Retinal detachment     OS    Septic shock 2017    Skin ulcer 3/18/2017    Tobacco dependence     Type II 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, not stated as uncontrolled(250.60)      Past Surgical History:   Procedure Laterality Date    BREAST BIOPSY Left 10 yrs ago    benign    CATARACT EXTRACTION Left     OS      SECTION      x2    EYE SURGERY      HYSTERECTOMY      partial    OOPHORECTOMY      one ovary conserved    RETINAL DETACHMENT SURGERY      buckle --OS    TONSILLECTOMY       Family History   Problem Relation Age of Onset    Arthritis Father     Heart disease Father     Early death Sister 30        heart disease    Heart disease Sister 32        MI    No Known Problems Brother     No Known Problems Daughter     Blindness Son         due to accident//    Arthritis Sister     Heart disease Sister     Crohn's disease Sister     Cancer Brother         asbestosis    Alcohol abuse Mother     Breast cancer Neg Hx     Glaucoma Neg Hx     Macular degeneration Neg Hx     Retinal detachment Neg Hx     Amblyopia Neg Hx     Diabetes Neg Hx     Cataracts Neg Hx     Hypertension Neg Hx     Strabismus Neg Hx     Stroke Neg Hx     Thyroid disease Neg Hx      Social History   Substance Use Topics    Smoking status: Former Smoker     Packs/day: 0.25     Years: 50.00     Types: Cigarettes     Start date: 1968     Quit date: 6/15/2018    Smokeless tobacco: Never Used    Alcohol use No     Review of Systems   Constitutional: Negative for fever.   HENT: Negative for sore throat.    Respiratory: Negative for cough and shortness of breath.    Cardiovascular: Negative for chest pain, palpitations and leg swelling.   Gastrointestinal: Negative for nausea.   Genitourinary: Negative for dysuria.   Musculoskeletal: Negative for back pain.   Skin: Negative for rash.    Neurological: Positive for dizziness, weakness, light-headedness and headaches.   Hematological: Does not bruise/bleed easily.       Physical Exam     Initial Vitals [06/19/18 1641]   BP Pulse Resp Temp SpO2   (!) 98/49 97 (!) 24 98.6 °F (37 °C) (!) 91 %      MAP       --         Physical Exam    Nursing note and vitals reviewed.  Constitutional: She appears well-developed and well-nourished. She is not diaphoretic. No distress.   Morbidly obese.    HENT:   Head: Normocephalic and atraumatic.   Eyes: EOM are normal. Pupils are equal, round, and reactive to light.   Neck: Normal range of motion. Neck supple.   Cardiovascular: Normal rate, regular rhythm, normal heart sounds and intact distal pulses. Exam reveals no gallop and no friction rub.    No murmur heard.  Pulmonary/Chest: Breath sounds normal. No respiratory distress. She has no wheezes. She has no rhonchi. She has no rales.   Abdominal: Soft. Bowel sounds are normal. There is no tenderness. There is no rebound and no guarding.   Dressing on the left side of the abdomen    Musculoskeletal: Normal range of motion.   Neurological: She is alert and oriented to person, place, and time.   Skin: Skin is warm and dry.   Psychiatric: She has a normal mood and affect. Her behavior is normal. Judgment and thought content normal.         ED Course   Procedures  Labs Reviewed   COMPREHENSIVE METABOLIC PANEL - Abnormal; Notable for the following:        Result Value    Glucose 121 (*)     Albumin 3.0 (*)     All other components within normal limits   CBC W/ AUTO DIFFERENTIAL - Abnormal; Notable for the following:     WBC 22.00 (*)     RDW 16.5 (*)     MPV 8.7 (*)     Lymph% 14.0 (*)     All other components within normal limits   MAGNESIUM   URINALYSIS          Imaging Results          X-Ray Chest 1 View (In process)                  Medical Decision Making:   History:   Old Medical Records: I decided to obtain old medical records.  Initial Assessment:   67-year-old  female presented with a chief complaint of body aches and fever.  Differential Diagnosis:   My differential diagnosis includes dehydration, anxiety, acute renal failure, and viral illness.   Clinical Tests:   Lab Tests: Ordered and Reviewed  ED Management:  The patient was emergently evaluated in the emergency department, her evaluation was significant for an elderly female with a benign abdominal exam.  The patient's chest x-ray shows no acute abnormalities per my independent interpretation.  The patient's labs were significant for a noninfected urine and an elevated white blood cell count.  Patient does have some small skin lesions noted to the left side of her pannus, without surrounding infection noted to them.  The etiology of her symptoms is likely a viral illness.  She is stable for discharge to home.  She is to continue over-the-counter medications as needed for her symptoms and she is to follow up with her PCP for further care.            Scribe Attestation:   Scribe #1: I performed the above scribed service and the documentation accurately describes the services I performed. I attest to the accuracy of the note.        I, Dr. Matt Greenberg, personally performed the services described in this documentation. All medical record entries made by the scribe were at my direction and in my presence.  I have reviewed the chart and agree that the record reflects my personal performance and is accurate and complete. Matt Greenberg MD.  9:33 PM 06/19/2018          Clinical Impression:   The primary encounter diagnosis was Viral syndrome. A diagnosis of Body aches was also pertinent to this visit.      Disposition:   Disposition: Discharged  Condition: Stable                        Matt Greenberg MD  06/19/18 3429

## 2018-06-20 NOTE — ED NOTES
Dressing change has been attempted after physician removed tape, family and patient request to not have dressing changed and have taped the other dressing back. Patient is aware that she will be discharged after breathing treatment is complete.

## 2018-06-20 NOTE — ED NOTES
Upon discharge, patient is AAOx4, no cardiac or respiratory complications. Follow up care reviewed with patient and has been instructed to return to the ER if needed. Patient verbalized understanding and was assisted to vehicle via wheel chair. CORIN FARRELL

## 2018-06-20 NOTE — ED NOTES
"Patient is aware that a urine specimen has been ordered. She states, "I will when I can." Patient is sitting up in bed eating a sonic burger.   "

## 2018-06-20 NOTE — ED NOTES
Presents to the ER with c/o generalize weakness that started 3 days ago. Associated complaints are subjective fever, nausea and fatigue. Patient reports that when she attempts to eat, she gets nauseated. Patient has a very large panniculus  with a bandage to the left lower abdomen. Mucous membranes are pink and moist. Skin is warm, dry and intact. Lungs are clear bilaterally, respirations are regular and unlabored. Denies cough, congestion, rhinorrhea or SOB. BS active x4, no tenderness with palpation, abd is soft and not distended. Denies any appetite or activity change. S1S2, capillary refill is < 2 seconds. Denies dysuria, difficulty urinating, frequency, numbness, tingling or weakness. BUD COOMBS

## 2018-06-22 NOTE — PROGRESS NOTES
Past Medical History:   Diagnosis Date    *Atrial flutter     Angina pectoris 2017    Anxiety     Arthritis     Asthma     Atrial fibrillation     Back pain     Cataract     OD    CHF (congestive heart failure)     COPD (chronic obstructive pulmonary disease)     Depression     Diabetes mellitus     Emphysema of lung     Heart failure     Hepatomegaly 2/3/2016    Hernia     History of MI (myocardial infarction) 2016    Hypercapnic respiratory failure, chronic 2016    Hyperlipidemia     Hypertension     Iron deficiency anemia 2/3/2016    Myocardial infarction     Obesity     Peripheral vascular disease     Pneumonia     Polyneuropathy     Retinal detachment     OS    Septic shock 2017    Skin ulcer 3/18/2017    Tobacco dependence     Type II 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, not stated as uncontrolled(250.60)        Past Surgical History:   Procedure Laterality Date    BREAST BIOPSY Left 10 yrs ago    benign    CATARACT EXTRACTION Left     OS      SECTION      x2    EYE SURGERY      HYSTERECTOMY      partial    OOPHORECTOMY      one ovary conserved    RETINAL DETACHMENT SURGERY      buckle --OS    TONSILLECTOMY         Current Outpatient Prescriptions   Medication Sig    albuterol (ACCUNEB) 0.63 mg/3 mL Nebu     albuterol (VENTOLIN HFA) 90 mcg/actuation inhaler Inhale 2 puffs into the lungs every 6 (six) hours as needed. Rescue    apixaban (ELIQUIS) 5 mg Tab Take 1 tablet (5 mg total) by mouth 2 (two) times daily.    ascorbic acid, vitamin C, (VITAMIN C) 500 MG tablet Take 1 tablet (500 mg total) by mouth every evening.    aspirin (ECOTRIN) 81 MG EC tablet Take 81 mg by mouth once daily.    atorvastatin (LIPITOR) 20 MG tablet TAKE 1 TABLET (20 MG TOTAL) BY MOUTH ONCE DAILY.    benzocaine 20 % Oint Apply 1 application topically every 6 (six) hours as needed.    clonazePAM (KLONOPIN) 1 MG tablet Take 1  tablet (1 mg total) by mouth 2 (two) times daily as needed for Anxiety. Dose has to be taper down.    collagenase (SANTYL) ointment Apply topically once daily.    fluticasone-vilanterol (BREO) 100-25 mcg/dose diskus inhaler Inhale 1 puff into the lungs once daily. Controller    furosemide (LASIX) 40 MG tablet TAKE ONE TABLET TWICE A DAYFOR FLUID OVERLOAD    gabapentin (NEURONTIN) 800 MG tablet Take 1 tablet (800 mg total) by mouth 3 (three) times daily.    HYDROcodone-acetaminophen (NORCO)  mg per tablet Take 1 tablet by mouth every 8 (eight) hours as needed for Pain (Do not take more than 3 a day. Do not mix with other narcotics.).    KLOR-CON M20 20 mEq tablet TAKE ONE TABLET BY MOUTH ONCE DAILY    levalbuterol (XOPENEX) 0.63 mg/3 mL nebulizer solution INHALE THE CONTENTS OF 1 VIAL BY NEBULIZATION 4 times  DAILY.    DX: J43.9    lisinopril (PRINIVIL,ZESTRIL) 20 MG tablet TAKE 1 TABLET EVERY DAY    metFORMIN (GLUCOPHAGE) 500 MG tablet Take 1 tablet (500 mg total) by mouth 2 (two) times daily with meals.    metoprolol succinate (TOPROL-XL) 25 MG 24 hr tablet TAKE 1/2 TABLET EVERY DAY    multivitamin (THERAGRAN) tablet Take 1 tablet by mouth once daily.    nystatin (MYCOSTATIN) cream Apply topically 2 (two) times daily.    nystatin (MYCOSTATIN) powder Apply topically 4 (four) times daily.    nystatin-triamcinolone (MYCOLOG II) cream APPLY SPARINGLY TO AFFECTED AREA(S) 3 TIMES DAILY AS NEEDED    polyethylene glycol (GLYCOLAX) 17 gram/dose powder Take 17 g by mouth once daily.    silver sulfADIAZINE 1% (SILVADENE) 1 % cream Apply topically once daily.    spironolactone (ALDACTONE) 25 MG tablet Take 1 tablet (25 mg total) by mouth once daily.    traZODone (DESYREL) 150 MG tablet TAKE 1 TABLET EVERY EVENING. (Patient taking differently: TAKE 1 TABLET EVERY EVENING.  75 mg every evening (1/2 of 150 mg tablet))     No current facility-administered medications for this visit.        Allergies    Allergen Reactions    Dilaudid [Hydromorphone] Anaphylaxis     Other reaction(s): Anaphylaxis  Other reaction(s): Unknown       Family History   Problem Relation Age of Onset    Arthritis Father     Heart disease Father     Early death Sister 30        heart disease    Heart disease Sister 32        MI    No Known Problems Brother     No Known Problems Daughter     Blindness Son         due to accident//    Arthritis Sister     Heart disease Sister     Crohn's disease Sister     Cancer Brother         asbestosis    Alcohol abuse Mother     Breast cancer Neg Hx     Glaucoma Neg Hx     Macular degeneration Neg Hx     Retinal detachment Neg Hx     Amblyopia Neg Hx     Diabetes Neg Hx     Cataracts Neg Hx     Hypertension Neg Hx     Strabismus Neg Hx     Stroke Neg Hx     Thyroid disease Neg Hx        Social History     Social History    Marital status:      Spouse name: N/A    Number of children: N/A    Years of education: N/A     Occupational History    Not on file.     Social History Main Topics    Smoking status: Former Smoker     Packs/day: 0.25     Years: 50.00     Types: Cigarettes     Start date: 1/19/1968     Quit date: 6/15/2018    Smokeless tobacco: Never Used    Alcohol use No    Drug use: No    Sexual activity: Not Currently     Other Topics Concern    Not on file     Social History Narrative    No narrative on file       Chief Complaint:   Chief Complaint   Patient presents with    Injections     EUFLEXXA 2/3 BILATERAL KNEE       Consulting Physician: No ref. provider found    History of present illness: This is a 64-year-old young lady who reports bilateral knee pain.  Pain today 7/10. No new injury.    Review of Systems:    Constitution: Denies chills, fever, and sweats.    HENT: Denies headaches. Reports blurry vision.    Cardiovascular: Denies chest pain or irregular heart beat.    Respiratory: Denies cough. Reports shortness of breath.    Gastrointestinal:  Denies abdominal pain, nausea, or vomiting.    Musculoskeletal:  Denies muscle cramps.    Neurological: Denies dizziness or focal weakness.    Psychiatric/Behavioral: Normal mental status. Anxiety.    Hematologic/Lymphatic: Denies bleeding problem or easy bruising/bleeding.    Skin: Denies rash or suspicious lesions.      Examination:    Vital Signs:    There were no vitals filed for this visit.    Body mass index is 44.92 kg/m².    This a well-developed, well nourished patient in no acute distress.    Alert and oriented and cooperative to examination.       Physical Exam: Left Knee Exam    Gait   antalgic    Skin  Rash:   None  Scars:   None    Inspection  Erythema:  None  Ecchymosis:  None  Effusion:  Mild  Masses:  None  Lymphadenopathy: None    Range of Motion: Full - 0 to 130°    Medial Joint : Mild  Lateral Joint : Mild    Patellofemoral Tenderness: None  Patellofemoral Crepitus: None    Lachman:  Normal  Anterior Drawer: Normal  Posterior Drawer: Normal    Dilia's:  Negative  Apley's:  Negative    Varus Stress:  Stable  Valgus Stress: Stable    Strength:  5/5    Pulses:  Palpable distal    Sensation:  Intact      Right Knee Exam    Gait:   Antalgic    Skin  Rash:   None  Scars:   None    Inspection  Erythema:  None  Ecchymosis:  None  Effusion:  Mild  Masses:  None  Lymphadenopathy: None    Range of Motion: Full - 0 to 130°    Medial Joint : Mild  Lateral Joint : Mild    Patellofemoral Tenderness: None  Patellofemoral Crepitus: None    Lachman:  Normal  Anterior Drawer: Normal  Posterior Drawer: Normal    Dilia's:  Negative  Apley's:  Negative    Varus Stress:  Stable  Valgus Stress: Stable    Strength:  5/5    Pulses:  Palpable distal    Sensation:  Intact          Imaging: X-rays of both knees reveals some degenerative changes that are moderate.     Assessment: Primary osteoarthritis of both knees  -     Large Joint Aspiration/Injection        Plan:   Euflexxa series    DISCLAIMER: This note may have been dictated using voice recognition software and may contain grammatical errors. Report sent to referring provider as required.

## 2018-06-22 NOTE — PROCEDURES
Large Joint Aspiration/Injection  Date/Time: 6/19/2018 9:08 PM  Performed by: DEE WILLIAMSON  Authorized by: DEE WILLIAMSON     Consent Done?:  Yes (Verbal)  Indications:  Pain  Timeout: Prior to procedure the correct patient, procedure, and site was verified      Location:  Knee  Site:  R knee and L knee  Prep: Patient was prepped and draped in usual sterile fashion    Approach:  Anteromedial  Medications:  20 mg sodium hyaluronate (EUFLEXXA) 10 mg/mL(mw 2.4 -3.6 million); 20 mg sodium hyaluronate (EUFLEXXA) 10 mg/mL(mw 2.4 -3.6 million)

## 2018-06-23 DIAGNOSIS — I15.2 HYPERTENSION ASSOCIATED WITH DIABETES: ICD-10-CM

## 2018-06-23 DIAGNOSIS — I50.22 CHRONIC SYSTOLIC CONGESTIVE HEART FAILURE: ICD-10-CM

## 2018-06-23 DIAGNOSIS — E11.59 HYPERTENSION ASSOCIATED WITH DIABETES: ICD-10-CM

## 2018-06-25 RX ORDER — BUDESONIDE 0.5 MG/2ML
INHALANT ORAL
Qty: 180 ML | Refills: 4 | Status: SHIPPED | OUTPATIENT
Start: 2018-06-25 | End: 2018-12-17 | Stop reason: SDUPTHER

## 2018-06-25 RX ORDER — ATORVASTATIN CALCIUM 20 MG/1
TABLET, FILM COATED ORAL
Qty: 90 TABLET | Refills: 3 | Status: SHIPPED | OUTPATIENT
Start: 2018-06-25 | End: 2018-12-28 | Stop reason: SDUPTHER

## 2018-06-25 RX ORDER — FUROSEMIDE 40 MG/1
TABLET ORAL
Qty: 90 TABLET | Refills: 3 | Status: SHIPPED | OUTPATIENT
Start: 2018-06-25 | End: 2018-12-06 | Stop reason: SDUPTHER

## 2018-06-25 RX ORDER — MELOXICAM 7.5 MG/1
TABLET ORAL
Qty: 90 TABLET | Refills: 1 | Status: ON HOLD | OUTPATIENT
Start: 2018-06-25 | End: 2018-08-28 | Stop reason: HOSPADM

## 2018-06-25 RX ORDER — NYSTATIN AND TRIAMCINOLONE ACETONIDE 100000; 1 [USP'U]/G; MG/G
CREAM TOPICAL
Qty: 30 G | Refills: 1 | Status: SHIPPED | OUTPATIENT
Start: 2018-06-25 | End: 2018-09-05 | Stop reason: SDUPTHER

## 2018-06-25 RX ORDER — ALBUTEROL SULFATE 90 UG/1
AEROSOL, METERED RESPIRATORY (INHALATION)
Qty: 18 G | Refills: 0 | Status: SHIPPED | OUTPATIENT
Start: 2018-06-25 | End: 2018-07-05 | Stop reason: SDUPTHER

## 2018-06-25 RX ORDER — DULOXETIN HYDROCHLORIDE 30 MG/1
CAPSULE, DELAYED RELEASE ORAL
Qty: 90 CAPSULE | Refills: 4 | Status: SHIPPED | OUTPATIENT
Start: 2018-06-25 | End: 2018-07-11

## 2018-06-26 ENCOUNTER — OFFICE VISIT (OUTPATIENT)
Dept: ORTHOPEDICS | Facility: CLINIC | Age: 68
End: 2018-06-26
Payer: MEDICARE

## 2018-06-26 VITALS — WEIGHT: 288 LBS | HEIGHT: 68 IN | BODY MASS INDEX: 43.65 KG/M2

## 2018-06-26 DIAGNOSIS — M17.0 PRIMARY OSTEOARTHRITIS OF BOTH KNEES: Primary | ICD-10-CM

## 2018-06-26 PROCEDURE — 99999 PR PBB SHADOW E&M-EST. PATIENT-LVL II: CPT | Mod: PBBFAC,,, | Performed by: ORTHOPAEDIC SURGERY

## 2018-06-26 PROCEDURE — 99499 UNLISTED E&M SERVICE: CPT | Mod: S$GLB,,, | Performed by: ORTHOPAEDIC SURGERY

## 2018-06-26 PROCEDURE — 99499 UNLISTED E&M SERVICE: CPT | Mod: S$PBB,,, | Performed by: ORTHOPAEDIC SURGERY

## 2018-06-26 PROCEDURE — 20610 DRAIN/INJ JOINT/BURSA W/O US: CPT | Mod: 50,S$GLB,, | Performed by: ORTHOPAEDIC SURGERY

## 2018-06-29 DIAGNOSIS — M51.36 LUMBAR DEGENERATIVE DISC DISEASE: ICD-10-CM

## 2018-06-29 DIAGNOSIS — M43.12 SPONDYLOLISTHESIS OF CERVICAL REGION: ICD-10-CM

## 2018-06-29 NOTE — TELEPHONE ENCOUNTER
Patient requesting a refill of Hydrocodone.  LR--6-2-18  LOV--6-2-18  FOV--9-6-18  Urine Toxicology--10-25-17  Pain Contract--10-25-17

## 2018-06-30 NOTE — PROGRESS NOTES
Past Medical History:   Diagnosis Date    *Atrial flutter     Angina pectoris 2017    Anxiety     Arthritis     Asthma     Atrial fibrillation     Back pain     Cataract     OD    CHF (congestive heart failure)     COPD (chronic obstructive pulmonary disease)     Depression     Diabetes mellitus     Emphysema of lung     Heart failure     Hepatomegaly 2/3/2016    Hernia     History of MI (myocardial infarction) 2016    Hypercapnic respiratory failure, chronic 2016    Hyperlipidemia     Hypertension     Iron deficiency anemia 2/3/2016    Myocardial infarction     Obesity     Peripheral vascular disease     Pneumonia     Polyneuropathy     Retinal detachment     OS    Septic shock 2017    Skin ulcer 3/18/2017    Tobacco dependence     Type II 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, not stated as uncontrolled(250.60)        Past Surgical History:   Procedure Laterality Date    BREAST BIOPSY Left 10 yrs ago    benign    CATARACT EXTRACTION Left     OS      SECTION      x2    EYE SURGERY      HYSTERECTOMY      partial    OOPHORECTOMY      one ovary conserved    RETINAL DETACHMENT SURGERY      buckle --OS    TONSILLECTOMY         Current Outpatient Prescriptions   Medication Sig    albuterol (ACCUNEB) 0.63 mg/3 mL Nebu     apixaban (ELIQUIS) 5 mg Tab Take 1 tablet (5 mg total) by mouth 2 (two) times daily.    ascorbic acid, vitamin C, (VITAMIN C) 500 MG tablet Take 1 tablet (500 mg total) by mouth every evening.    aspirin (ECOTRIN) 81 MG EC tablet Take 81 mg by mouth once daily.    atorvastatin (LIPITOR) 20 MG tablet TAKE 1 TABLET EVERY DAY    benzocaine 20 % Oint Apply 1 application topically every 6 (six) hours as needed.    budesonide (PULMICORT) 0.5 mg/2 mL nebulizer solution INHALE THE CONTENTS OF 1 VIAL VIA NEBULIZER EVERY DAY    clonazePAM (KLONOPIN) 1 MG tablet Take 1 tablet (1 mg total) by mouth 2 (two)  times daily as needed for Anxiety. Dose has to be taper down.    collagenase (SANTYL) ointment Apply topically once daily.    DULoxetine (CYMBALTA) 30 MG capsule TAKE 1 CAPSULE (30 MG TOTAL) BY MOUTH ONCE DAILY.    fluticasone-vilanterol (BREO) 100-25 mcg/dose diskus inhaler Inhale 1 puff into the lungs once daily. Controller    furosemide (LASIX) 40 MG tablet TAKE 1 TABLET ONE TIME DAILY  FOR  FLUID  OVERLOAD    gabapentin (NEURONTIN) 800 MG tablet Take 1 tablet (800 mg total) by mouth 3 (three) times daily.    HYDROcodone-acetaminophen (NORCO)  mg per tablet Take 1 tablet by mouth every 8 (eight) hours as needed for Pain (Do not take more than 3 a day. Do not mix with other narcotics.).    KLOR-CON M20 20 mEq tablet TAKE ONE TABLET BY MOUTH ONCE DAILY    levalbuterol (XOPENEX) 0.63 mg/3 mL nebulizer solution INHALE THE CONTENTS OF 1 VIAL BY NEBULIZATION 4 times  DAILY.    DX: J43.9    lisinopril (PRINIVIL,ZESTRIL) 20 MG tablet TAKE 1 TABLET EVERY DAY    meloxicam (MOBIC) 7.5 MG tablet TAKE 1 TABLET EVERY DAY    metFORMIN (GLUCOPHAGE) 500 MG tablet Take 1 tablet (500 mg total) by mouth 2 (two) times daily with meals.    metoprolol succinate (TOPROL-XL) 25 MG 24 hr tablet TAKE 1/2 TABLET EVERY DAY    multivitamin (THERAGRAN) tablet Take 1 tablet by mouth once daily.    nystatin (MYCOSTATIN) cream Apply topically 2 (two) times daily.    nystatin (MYCOSTATIN) powder Apply topically 4 (four) times daily.    nystatin-triamcinolone (MYCOLOG II) cream APPLY SPARINGLY TO AFFECTED AREA(S) 3 TIMES DAILY AS NEEDED    polyethylene glycol (GLYCOLAX) 17 gram/dose powder Take 17 g by mouth once daily.    silver sulfADIAZINE 1% (SILVADENE) 1 % cream Apply topically once daily.    spironolactone (ALDACTONE) 25 MG tablet Take 1 tablet (25 mg total) by mouth once daily.    traZODone (DESYREL) 150 MG tablet TAKE 1 TABLET EVERY EVENING. (Patient taking differently: TAKE 1 TABLET EVERY EVENING.  75 mg every  evening (1/2 of 150 mg tablet))    VENTOLIN HFA 90 mcg/actuation inhaler INHALE 2 PUFFS EVERY 6 HOURS AS NEEDED FOR WHEEZING     No current facility-administered medications for this visit.        Allergies   Allergen Reactions    Dilaudid [Hydromorphone] Anaphylaxis     Other reaction(s): Anaphylaxis  Other reaction(s): Unknown       Family History   Problem Relation Age of Onset    Arthritis Father     Heart disease Father     Early death Sister 30        heart disease    Heart disease Sister 32        MI    No Known Problems Brother     No Known Problems Daughter     Blindness Son         due to accident//    Arthritis Sister     Heart disease Sister     Crohn's disease Sister     Cancer Brother         asbestosis    Alcohol abuse Mother     Breast cancer Neg Hx     Glaucoma Neg Hx     Macular degeneration Neg Hx     Retinal detachment Neg Hx     Amblyopia Neg Hx     Diabetes Neg Hx     Cataracts Neg Hx     Hypertension Neg Hx     Strabismus Neg Hx     Stroke Neg Hx     Thyroid disease Neg Hx        Social History     Social History    Marital status:      Spouse name: N/A    Number of children: N/A    Years of education: N/A     Occupational History    Not on file.     Social History Main Topics    Smoking status: Former Smoker     Packs/day: 0.25     Years: 50.00     Types: Cigarettes     Start date: 1/19/1968     Quit date: 6/15/2018    Smokeless tobacco: Never Used    Alcohol use No    Drug use: No    Sexual activity: Not Currently     Other Topics Concern    Not on file     Social History Narrative    No narrative on file       Chief Complaint:   Chief Complaint   Patient presents with    Injections     EUFLEXXA 3/3 BILATERAL KNEE       Consulting Physician: No ref. provider found    History of present illness: This is a 64-year-old young lady who reports bilateral knee pain.  Pain today 7/10. No new injury.    Review of Systems:    Constitution: Denies chills,  fever, and sweats.    HENT: Denies headaches. Reports blurry vision.    Cardiovascular: Denies chest pain or irregular heart beat.    Respiratory: Denies cough. Reports shortness of breath.    Gastrointestinal: Denies abdominal pain, nausea, or vomiting.    Musculoskeletal:  Denies muscle cramps.    Neurological: Denies dizziness or focal weakness.    Psychiatric/Behavioral: Normal mental status. Anxiety.    Hematologic/Lymphatic: Denies bleeding problem or easy bruising/bleeding.    Skin: Denies rash or suspicious lesions.      Examination:    Vital Signs:    There were no vitals filed for this visit.    Body mass index is 43.79 kg/m².    This a well-developed, well nourished patient in no acute distress.    Alert and oriented and cooperative to examination.       Physical Exam: Left Knee Exam    Gait   antalgic    Skin  Rash:   None  Scars:   None    Inspection  Erythema:  None  Ecchymosis:  None  Effusion:  Mild  Masses:  None  Lymphadenopathy: None    Range of Motion: Full - 0 to 130°    Medial Joint : Mild  Lateral Joint : Mild    Patellofemoral Tenderness: None  Patellofemoral Crepitus: None    Lachman:  Normal  Anterior Drawer: Normal  Posterior Drawer: Normal    Dilia's:  Negative  Apley's:  Negative    Varus Stress:  Stable  Valgus Stress: Stable    Strength:  5/5    Pulses:  Palpable distal    Sensation:  Intact      Right Knee Exam    Gait:   Antalgic    Skin  Rash:   None  Scars:   None    Inspection  Erythema:  None  Ecchymosis:  None  Effusion:  Mild  Masses:  None  Lymphadenopathy: None    Range of Motion: Full - 0 to 130°    Medial Joint : Mild  Lateral Joint : Mild    Patellofemoral Tenderness: None  Patellofemoral Crepitus: None    Lachman:  Normal  Anterior Drawer: Normal  Posterior Drawer: Normal    Dilia's:  Negative  Apley's:  Negative    Varus Stress:  Stable  Valgus Stress: Stable    Strength:  5/5    Pulses:  Palpable  distal    Sensation:  Intact          Imaging: X-rays of both knees reveals some degenerative changes that are moderate.     Assessment: Primary osteoarthritis of both knees  -     Large Joint Aspiration/Injection        Plan:  Euflexxa series    DISCLAIMER: This note may have been dictated using voice recognition software and may contain grammatical errors. Report sent to referring provider as required.

## 2018-06-30 NOTE — PROCEDURES
Large Joint Aspiration/Injection  Date/Time: 6/26/2018 11:07 PM  Performed by: DEE WILLIAMSON  Authorized by: DEE WILLIAMSON     Consent Done?:  Yes (Verbal)  Indications:  Pain  Timeout: Prior to procedure the correct patient, procedure, and site was verified      Location:  Knee  Site:  R knee and L knee  Prep: Patient was prepped and draped in usual sterile fashion    Approach:  Anteromedial  Medications:  20 mg sodium hyaluronate (EUFLEXXA) 10 mg/mL(mw 2.4 -3.6 million); 20 mg sodium hyaluronate (EUFLEXXA) 10 mg/mL(mw 2.4 -3.6 million)

## 2018-07-01 RX ORDER — HYDROCODONE BITARTRATE AND ACETAMINOPHEN 10; 325 MG/1; MG/1
1 TABLET ORAL EVERY 8 HOURS PRN
Qty: 90 TABLET | Refills: 0 | Status: SHIPPED | OUTPATIENT
Start: 2018-07-01 | End: 2018-07-31 | Stop reason: SDUPTHER

## 2018-07-05 RX ORDER — POLYETHYLENE GLYCOL 3350 17 G/17G
POWDER, FOR SOLUTION ORAL
Qty: 1530 G | Refills: 0 | Status: SHIPPED | OUTPATIENT
Start: 2018-07-05 | End: 2018-07-29 | Stop reason: SDUPTHER

## 2018-07-05 RX ORDER — ALBUTEROL SULFATE 90 UG/1
2 AEROSOL, METERED RESPIRATORY (INHALATION) EVERY 6 HOURS PRN
Qty: 3 INHALER | Refills: 3 | Status: SHIPPED | OUTPATIENT
Start: 2018-07-05 | End: 2019-04-18 | Stop reason: SDUPTHER

## 2018-07-09 ENCOUNTER — TELEPHONE (OUTPATIENT)
Dept: FAMILY MEDICINE | Facility: CLINIC | Age: 68
End: 2018-07-09

## 2018-07-09 ENCOUNTER — PES CALL (OUTPATIENT)
Dept: ADMINISTRATIVE | Facility: CLINIC | Age: 68
End: 2018-07-09

## 2018-07-09 NOTE — TELEPHONE ENCOUNTER
First available appointment in [facility] is 7-16-18.  First available appointment in [facility] is 7-16-18. Above mentioned appointments offered to patient. Patient states she will go to urgent care.             ----- Message from Kathleen Pickens sent at 7/9/2018  4:00 PM CDT -----  Contact: [phone]   Patient requesting same day appointment due to cough, runny nose, and fever at night.    Please call patient at [phone].   Thanks!

## 2018-07-11 ENCOUNTER — OFFICE VISIT (OUTPATIENT)
Dept: FAMILY MEDICINE | Facility: CLINIC | Age: 68
End: 2018-07-11
Payer: MEDICARE

## 2018-07-11 VITALS
TEMPERATURE: 98 F | WEIGHT: 290.56 LBS | HEART RATE: 71 BPM | HEIGHT: 68 IN | OXYGEN SATURATION: 91 % | RESPIRATION RATE: 18 BRPM | DIASTOLIC BLOOD PRESSURE: 59 MMHG | BODY MASS INDEX: 44.04 KG/M2 | SYSTOLIC BLOOD PRESSURE: 95 MMHG

## 2018-07-11 DIAGNOSIS — B96.89 ACUTE BACTERIAL BRONCHITIS: ICD-10-CM

## 2018-07-11 DIAGNOSIS — R09.02 HYPOXIA: Primary | ICD-10-CM

## 2018-07-11 DIAGNOSIS — J20.8 ACUTE BACTERIAL BRONCHITIS: ICD-10-CM

## 2018-07-11 PROCEDURE — 3074F SYST BP LT 130 MM HG: CPT | Mod: CPTII,S$GLB,, | Performed by: FAMILY MEDICINE

## 2018-07-11 PROCEDURE — 99214 OFFICE O/P EST MOD 30 MIN: CPT | Mod: 25,S$GLB,, | Performed by: FAMILY MEDICINE

## 2018-07-11 PROCEDURE — 3078F DIAST BP <80 MM HG: CPT | Mod: CPTII,S$GLB,, | Performed by: FAMILY MEDICINE

## 2018-07-11 PROCEDURE — 99999 PR PBB SHADOW E&M-EST. PATIENT-LVL III: CPT | Mod: PBBFAC,,, | Performed by: FAMILY MEDICINE

## 2018-07-11 PROCEDURE — 96372 THER/PROPH/DIAG INJ SC/IM: CPT | Mod: S$GLB,,, | Performed by: FAMILY MEDICINE

## 2018-07-11 RX ORDER — DOXYCYCLINE 100 MG/1
100 CAPSULE ORAL 2 TIMES DAILY
Qty: 20 CAPSULE | Refills: 0 | Status: SHIPPED | OUTPATIENT
Start: 2018-07-11 | End: 2018-08-17 | Stop reason: ALTCHOICE

## 2018-07-11 RX ORDER — CALCIUM CITRATE/VITAMIN D3 200MG-6.25
1 TABLET ORAL 3 TIMES DAILY
COMMUNITY
Start: 2018-06-27 | End: 2018-08-22 | Stop reason: CLARIF

## 2018-07-11 RX ORDER — BETAMETHASONE SODIUM PHOSPHATE AND BETAMETHASONE ACETATE 3; 3 MG/ML; MG/ML
9 INJECTION, SUSPENSION INTRA-ARTICULAR; INTRALESIONAL; INTRAMUSCULAR; SOFT TISSUE
Status: COMPLETED | OUTPATIENT
Start: 2018-07-11 | End: 2018-07-11

## 2018-07-11 RX ADMIN — BETAMETHASONE SODIUM PHOSPHATE AND BETAMETHASONE ACETATE 9 MG: 3; 3 INJECTION, SUSPENSION INTRA-ARTICULAR; INTRALESIONAL; INTRAMUSCULAR; SOFT TISSUE at 09:07

## 2018-07-11 NOTE — PROGRESS NOTES
Subjective:       Patient ID: Nelly Tian is a 67 y.o. female.    Chief Complaint: No chief complaint on file.    Cough   This is a new problem. The current episode started in the past 7 days. The problem has been gradually worsening. The problem occurs hourly. The cough is productive of purulent sputum. Associated symptoms include nasal congestion, shortness of breath and wheezing. Pertinent negatives include no chest pain, fever, hemoptysis or rash. Risk factors for lung disease include travel. Treatments tried: Aluterol or Xoponex Nebs - TID. The treatment provided mild relief. Her past medical history is significant for COPD.     Review of Systems   Constitutional: Negative for fever.   Respiratory: Positive for cough, shortness of breath and wheezing. Negative for hemoptysis.    Cardiovascular: Negative for chest pain.   Gastrointestinal: Negative for abdominal pain and nausea.   Skin: Negative for rash.   All other systems reviewed and are negative.      Objective:      Physical Exam   Constitutional: She appears well-developed. No distress.   HENT:   Right Ear: Tympanic membrane normal. Tympanic membrane is not erythematous.   Left Ear: Tympanic membrane normal. Tympanic membrane is not erythematous.   Nose: Mucosal edema present. Right sinus exhibits no maxillary sinus tenderness. Left sinus exhibits no maxillary sinus tenderness.   Mouth/Throat: Posterior oropharyngeal erythema present.   Neck: Neck supple.   Cardiovascular: Normal rate and regular rhythm.    No murmur heard.  Pulmonary/Chest: Effort normal. She has decreased breath sounds. She has wheezes. She has rhonchi.   Lymphadenopathy:     She has no cervical adenopathy.   Skin: Skin is warm and dry.       Assessment:       1. Hypoxia    2. Acute bacterial bronchitis        Plan:         Diagnoses and all orders for this visit:    Hypoxia    Acute bacterial bronchitis    Other orders  -     doxycycline (MONODOX) 100 MG capsule; Take 1 capsule  (100 mg total) by mouth 2 (two) times daily.  -     betamethasone acetate-betamethasone sodium phosphate injection 9 mg; Inject 1.5 mLs (9 mg total) into the muscle one time.    Increase Xopenex Nebs to QID

## 2018-07-12 ENCOUNTER — OUTPATIENT CASE MANAGEMENT (OUTPATIENT)
Dept: ADMINISTRATIVE | Facility: OTHER | Age: 68
End: 2018-07-12

## 2018-07-12 ENCOUNTER — TELEPHONE (OUTPATIENT)
Dept: OPHTHALMOLOGY | Facility: CLINIC | Age: 68
End: 2018-07-12

## 2018-07-12 NOTE — TELEPHONE ENCOUNTER
----- Message from Leonor Sosa sent at 7/12/2018  9:45 AM CDT -----  Contact: patient   Patient calling to speak to the Nurse regarding scheduling a annual eye exam. Patient was last seen by Dr. Franks in [place] 2/2016. Please advise. Patient would like to be seen 8/1/2018, due to her receiving her check.   Call back   Thanks!

## 2018-07-12 NOTE — PROGRESS NOTES
The following patient has been assigned to Latoya Sykes RN with [facility]patient Complex [facility] for high risk screening.    Reason: High Risk Recently Seen in Clinic    Please contact [facility] at [phone] with any questions.    Thank you,    Marlyn Luciano    Outpatient Case Management

## 2018-07-18 ENCOUNTER — OUTPATIENT CASE MANAGEMENT (OUTPATIENT)
Dept: ADMINISTRATIVE | Facility: OTHER | Age: 68
End: 2018-07-18

## 2018-07-18 ENCOUNTER — TELEPHONE (OUTPATIENT)
Dept: FAMILY MEDICINE | Facility: CLINIC | Age: 68
End: 2018-07-18

## 2018-07-18 DIAGNOSIS — I50.9 CONGESTIVE HEART FAILURE, UNSPECIFIED HF CHRONICITY, UNSPECIFIED HEART FAILURE TYPE: ICD-10-CM

## 2018-07-18 DIAGNOSIS — J43.9 MIXED RESTRICTIVE AND OBSTRUCTIVE LUNG DISEASE: ICD-10-CM

## 2018-07-18 DIAGNOSIS — M51.36 DDD (DEGENERATIVE DISC DISEASE), LUMBAR: Primary | Chronic | ICD-10-CM

## 2018-07-18 DIAGNOSIS — J98.4 MIXED RESTRICTIVE AND OBSTRUCTIVE LUNG DISEASE: ICD-10-CM

## 2018-07-18 DIAGNOSIS — E66.01 MORBID OBESITY WITH BMI OF 40.0-44.9, ADULT: ICD-10-CM

## 2018-07-18 NOTE — PROGRESS NOTES
7/18/2018  Referral received for enrollment in [facility] high risk program.  Patient has been followed by this RN in the past.  Patient is 68yo female with a PMH that includes: DDD - degenerative disc disease lumbar, depression, anxiety, pain medication dependency, restrictive and abstructive lung disease, chronic hypercapnic respiratory failure, COPD, oxygen dependent, chronic A-fib, HTN, PVD, hyperlipidemia, CHF w/EF of 50-55%, iron deficiency anemia, DM with an A1C 6.2, mobid obesity with BMI of 40.0-44.9, arthritis, GERD, tobacco dependency.  Pt. Lives at home with her adult daughter Maye, # [phone].  Patient reports being fairly independent of ADLs, and IADLs, patient drives occasionally.  Depends mostly on family to provide transportation.  Patient is currently followed by [facility], with SN visits for wound care.  Reviewed Provider Portal.  Patient reports recent visit to Dr. Cerna for Acute bacterial bronchitis Plan: -     doxycycline (MONODOX) 100 MG capsule; Take 1 capsule (100 mg total) by mouth 2 (two) times daily.Increase Xopenex Nebs to QID.  Patient reports that she is still taking the doxycycline, encouraged her to take until gone.  Patient verbalized understanding.  Patient reports productive cough, with clear or white sputum.  Patient reports feeling better.  Encouraged patient to quit smoking.  Offered smoking cessation information, will mail.  DME in the home includes, home oxygen - portable and concentrator,walker, nebulizer, glucometer, BP cuff.  Patient reports using 2L continuous oxygen.   Patient is requesting shower chair, states her chair is broken - patient is unable to tell me how old the chair is or if it was supplied through medicare.  Informed patient I will request an order from PCP.  Patient verbalized understanding.  No other needs identified at the time of this call.  Given patient my contact information/office hours.  Encouraged her to call with any needs.  Patient verbalized  understanding and is agreeable to follow up next week.      Intervention: initial screen, enrollment [facility] high risk program, assessment, care plan, PHQ screen, collaborate with PCP re DME need, mail COPD education, mail smoking cessation information, collaborate with HH via portal.    Follow up plan:  COPD education, review wound care.  JUANITA Zhu

## 2018-07-18 NOTE — TELEPHONE ENCOUNTER
----- Message from Latoya Sykes RN sent at 7/18/2018  3:29 PM CDT -----  Contact: Latoya Sykes -[phone]  Enrolled patient in [facility] today.  She is requesting a shower chair.  States her current on is broken and for safety needs a new one.      Fax order to [facility] # [phone]  Fax # [phone]    Thank you,  Kimberlyn   Outpatient complex care management

## 2018-07-23 ENCOUNTER — TELEPHONE (OUTPATIENT)
Dept: OPHTHALMOLOGY | Facility: CLINIC | Age: 68
End: 2018-07-23

## 2018-07-25 ENCOUNTER — TELEPHONE (OUTPATIENT)
Dept: FAMILY MEDICINE | Facility: CLINIC | Age: 68
End: 2018-07-25

## 2018-07-25 NOTE — TELEPHONE ENCOUNTER
----- Message from Yaniv Kulkarni sent at 7/24/2018  2:02 PM CDT -----  Contact: Patient  Nelly, [phone]. Calling in regards to her wounds on her side. Will go in to detail once she speaks with the nurse. Please advise. Thanks.

## 2018-07-25 NOTE — TELEPHONE ENCOUNTER
Spoke to patient who states she has a wound care appointment scheduled at [facility] on tomorrow. Requesting to know if there is a wound care doctor in [facility]. Advised patient clinic does not have a wound care doctor, explained to patient wound care is usually referred to [facility] or other wound care center. Patient verbalized understanding.

## 2018-07-27 NOTE — TELEPHONE ENCOUNTER
FOV: 8/6/18    LOV: 7/11/18    LR: 7/5/18    Contract/Tox:     Note: Please check seems too soon if taken daily

## 2018-07-29 RX ORDER — POLYETHYLENE GLYCOL 3350 17 G/17G
17 POWDER, FOR SOLUTION ORAL NIGHTLY PRN
Qty: 1 BOTTLE | Refills: 11 | Status: SHIPPED | OUTPATIENT
Start: 2018-07-29 | End: 2018-08-28

## 2018-07-31 DIAGNOSIS — M51.36 LUMBAR DEGENERATIVE DISC DISEASE: ICD-10-CM

## 2018-07-31 DIAGNOSIS — M43.12 SPONDYLOLISTHESIS OF CERVICAL REGION: ICD-10-CM

## 2018-07-31 NOTE — TELEPHONE ENCOUNTER
Patient requesting a refill of Hydrocodone.  LR--7-1-18  LOV--7-11-18  FOV--9-6-18  Urine Toxicology--10-25-17  Pain Contract--10-25-17

## 2018-08-01 RX ORDER — HYDROCODONE BITARTRATE AND ACETAMINOPHEN 10; 325 MG/1; MG/1
1 TABLET ORAL EVERY 8 HOURS PRN
Qty: 90 TABLET | Refills: 0 | Status: SHIPPED | OUTPATIENT
Start: 2018-08-01 | End: 2018-08-22 | Stop reason: CLARIF

## 2018-08-02 DIAGNOSIS — M51.36 LUMBAR DEGENERATIVE DISC DISEASE: ICD-10-CM

## 2018-08-02 DIAGNOSIS — M43.12 SPONDYLOLISTHESIS OF CERVICAL REGION: ICD-10-CM

## 2018-08-03 ENCOUNTER — TELEPHONE (OUTPATIENT)
Dept: FAMILY MEDICINE | Facility: CLINIC | Age: 68
End: 2018-08-03

## 2018-08-03 ENCOUNTER — OUTPATIENT CASE MANAGEMENT (OUTPATIENT)
Dept: ADMINISTRATIVE | Facility: OTHER | Age: 68
End: 2018-08-03

## 2018-08-03 DIAGNOSIS — M51.36 LUMBAR DEGENERATIVE DISC DISEASE: ICD-10-CM

## 2018-08-03 DIAGNOSIS — J41.8 MIXED SIMPLE AND MUCOPURULENT CHRONIC BRONCHITIS: ICD-10-CM

## 2018-08-03 DIAGNOSIS — L08.9: ICD-10-CM

## 2018-08-03 DIAGNOSIS — M43.12 SPONDYLOLISTHESIS OF CERVICAL REGION: ICD-10-CM

## 2018-08-03 DIAGNOSIS — L89.92: ICD-10-CM

## 2018-08-03 RX ORDER — HYDROCODONE BITARTRATE AND ACETAMINOPHEN 10; 325 MG/1; MG/1
1 TABLET ORAL EVERY 8 HOURS PRN
Qty: 90 TABLET | Refills: 0 | Status: CANCELLED | OUTPATIENT
Start: 2018-08-03

## 2018-08-03 RX ORDER — SILVER SULFADIAZINE 10 G/1000G
CREAM TOPICAL DAILY
Qty: 50 G | Refills: 0 | OUTPATIENT
Start: 2018-08-03

## 2018-08-03 RX ORDER — FLUTICASONE FUROATE AND VILANTEROL TRIFENATATE 100; 25 UG/1; UG/1
POWDER RESPIRATORY (INHALATION)
Qty: 60 EACH | Refills: 4 | Status: SHIPPED | OUTPATIENT
Start: 2018-08-03 | End: 2018-10-09 | Stop reason: SDUPTHER

## 2018-08-03 RX ORDER — HYDROCODONE BITARTRATE AND ACETAMINOPHEN 10; 325 MG/1; MG/1
1 TABLET ORAL EVERY 8 HOURS PRN
Qty: 90 TABLET | Refills: 0 | Status: SHIPPED | OUTPATIENT
Start: 2018-08-03 | End: 2018-09-04 | Stop reason: SDUPTHER

## 2018-08-03 NOTE — TELEPHONE ENCOUNTER
----- Message from Sara Hodge sent at 8/3/2018 11:21 AM CDT -----  Contact: Self  [facility] is needing to speak to the nurse about medication     clonazePAM (KLONOPIN) 1 MG tablet    Please call back [phone]

## 2018-08-06 RX ORDER — ALBUTEROL SULFATE 0.63 MG/3ML
SOLUTION RESPIRATORY (INHALATION)
Qty: 75 ML | Refills: 11 | Status: SHIPPED | OUTPATIENT
Start: 2018-08-06 | End: 2018-12-17 | Stop reason: SDUPTHER

## 2018-08-06 RX ORDER — CLONAZEPAM 1 MG/1
1 TABLET ORAL 2 TIMES DAILY PRN
Qty: 60 TABLET | Refills: 3 | Status: CANCELLED | OUTPATIENT
Start: 2018-08-06

## 2018-08-07 NOTE — TELEPHONE ENCOUNTER
Please see attached message.  Spoke to Taty with [facility] who states she received a prescription for Clonazepam for patient dated 6-2-18 for 60 tablets/3 refills. States directions state dose has to taper down. Mrs. Bruno is requesting clarification of these directions. Requesting to know how this medication is to be tapered down, and when patient is supposed to start tapering this medication. Please advise.

## 2018-08-08 NOTE — TELEPHONE ENCOUNTER
Please see message from Dr. Bird. Spoke to Fritz with pharmacy for notification. Mr. Hu verbalized understanding.

## 2018-08-11 RX ORDER — CLONAZEPAM 1 MG/1
TABLET ORAL
Qty: 60 TABLET | Refills: 1 | Status: SHIPPED | OUTPATIENT
Start: 2018-08-11 | End: 2018-10-03 | Stop reason: SDUPTHER

## 2018-08-11 NOTE — TELEPHONE ENCOUNTER
This was already refilled. Please clarify pharmacist that prescrption dose will be address at next visit. This rx was sent 08/03.

## 2018-08-17 ENCOUNTER — OUTPATIENT CASE MANAGEMENT (OUTPATIENT)
Dept: ADMINISTRATIVE | Facility: OTHER | Age: 68
End: 2018-08-17

## 2018-08-17 ENCOUNTER — OFFICE VISIT (OUTPATIENT)
Dept: FAMILY MEDICINE | Facility: CLINIC | Age: 68
End: 2018-08-17
Payer: MEDICARE

## 2018-08-17 VITALS
DIASTOLIC BLOOD PRESSURE: 60 MMHG | HEART RATE: 80 BPM | HEIGHT: 68 IN | BODY MASS INDEX: 44.41 KG/M2 | OXYGEN SATURATION: 93 % | TEMPERATURE: 98 F | RESPIRATION RATE: 18 BRPM | SYSTOLIC BLOOD PRESSURE: 122 MMHG | WEIGHT: 293 LBS

## 2018-08-17 DIAGNOSIS — K21.9 GASTROESOPHAGEAL REFLUX DISEASE, ESOPHAGITIS PRESENCE NOT SPECIFIED: ICD-10-CM

## 2018-08-17 DIAGNOSIS — L03.115 CELLULITIS OF RIGHT LOWER EXTREMITY: Primary | ICD-10-CM

## 2018-08-17 PROCEDURE — 99213 OFFICE O/P EST LOW 20 MIN: CPT | Mod: S$GLB,,, | Performed by: PHYSICIAN ASSISTANT

## 2018-08-17 PROCEDURE — 3078F DIAST BP <80 MM HG: CPT | Mod: CPTII,S$GLB,, | Performed by: PHYSICIAN ASSISTANT

## 2018-08-17 PROCEDURE — 99499 UNLISTED E&M SERVICE: CPT | Mod: S$GLB,,, | Performed by: PHYSICIAN ASSISTANT

## 2018-08-17 PROCEDURE — 99999 PR PBB SHADOW E&M-EST. PATIENT-LVL V: CPT | Mod: PBBFAC,,, | Performed by: PHYSICIAN ASSISTANT

## 2018-08-17 PROCEDURE — 3074F SYST BP LT 130 MM HG: CPT | Mod: CPTII,S$GLB,, | Performed by: PHYSICIAN ASSISTANT

## 2018-08-17 RX ORDER — OMEPRAZOLE 20 MG/1
20 CAPSULE, DELAYED RELEASE ORAL DAILY
Qty: 30 CAPSULE | Refills: 11 | Status: SHIPPED | OUTPATIENT
Start: 2018-08-17 | End: 2018-12-28 | Stop reason: SDUPTHER

## 2018-08-17 RX ORDER — DOXYCYCLINE 100 MG/1
100 CAPSULE ORAL EVERY 12 HOURS
Qty: 20 CAPSULE | Refills: 0 | Status: ON HOLD | OUTPATIENT
Start: 2018-08-17 | End: 2018-08-28 | Stop reason: HOSPADM

## 2018-08-17 NOTE — PROGRESS NOTES
Subjective:       Patient ID: Nelly Tian is a 67 y.o. female.    Chief Complaint: Leg Pain (right leg ulcer)    Ms. Tian comes to clinic today concerned about a wound on her right lower leg that has been present over for 2 weeks. She cannot recall hitting her leg or injuring herself. The patient has been applying silvadene to her leg.    Wound Check: Patient presents for wound check. Patient has a open wound which is located on the right lower extremity. Current symptoms: pain: moderate  erythema: moderate. Symptoms began 2 weeks ago. Pain is rated 7/10. Interventions to date: silvadene ointment.    The patient also complains of reflux symptoms. She reports she tried omeprazole for this with improvement; the patient requests this medication to be refilled.       Review of Systems   Constitutional: Negative for activity change, appetite change and fever.   Eyes: Negative for visual disturbance.   Respiratory: Negative for cough and shortness of breath.    Cardiovascular: Negative for chest pain.   Gastrointestinal: Negative for abdominal distention and abdominal pain.        GERD   Genitourinary: Negative for difficulty urinating and dysuria.   Musculoskeletal: Negative for arthralgias and myalgias.   Skin: Positive for color change and wound.   Neurological: Negative for headaches.   Hematological: Negative for adenopathy.   Psychiatric/Behavioral: The patient is not nervous/anxious.        Objective:      Physical Exam   Constitutional: She is oriented to person, place, and time.   Cardiovascular: Normal rate and regular rhythm.   Pulmonary/Chest: Effort normal and breath sounds normal. She has no wheezes.   Abdominal: Soft. Bowel sounds are normal. There is no tenderness.   Musculoskeletal: She exhibits no edema.   Neurological: She is alert and oriented to person, place, and time.   Skin: No erythema.        Psychiatric: Her behavior is normal.       Assessment:       1. Cellulitis of right lower  extremity    2. Gastroesophageal reflux disease, esophagitis presence not specified        Plan:   Nelly was seen today for leg pain.    Diagnoses and all orders for this visit:    Cellulitis of right lower extremity  -     doxycycline (VIBRAMYCIN) 100 MG Cap; Take 1 capsule (100 mg total) by mouth every 12 (twelve) hours.  Take antibiotics with food.  Increase fluid intake.  Call the clinic if symptoms worsen, new symptoms develop or if you are not any better after completion of your antibiotics.    Avoid sun while taking this antibiotic  Patient has upcoming appointments for [facility] and scheduled appt with Ms. Garcia for recheck.  Gastroesophageal reflux disease, esophagitis presence not specified  -     omeprazole (PRILOSEC) 20 MG capsule; Take 1 capsule (20 mg total) by mouth once daily.  Avoid trigger foods   Stop eating 2-3 hours before bed

## 2018-08-17 NOTE — PATIENT INSTRUCTIONS
Take antibiotics with food.  Increase fluid intake.  Call the clinic if symptoms worsen, new symptoms develop or if you are not any better after completion of your antibiotics.      Avoid sun exposure while taking this antibiotic.

## 2018-08-17 NOTE — PROGRESS NOTES
8/17/2018  1200  Follow up call completed with patient today.  Discussed with patient smoking cessation.  Patient states she has cut back.  Education reviewed for COPD, patient is aware.  Mailed COPD living well with COPD.  Reviewed recognize S&S flare up, changes in the color of mucus, amount of mucus, increase in cough, SOB even with rest, wheezing.  Contact provider when these S&S occur.  Patient reports wounds looking better, encouraged keeping area clean and dry.  Patient reports daughter is still doing wound care and dressing changes.  Encouraged watching for S&S of infection.  Patient verbalized understanding.  Patient is agreeable to follow up call in couple weeks.      Intervention: reviewed COPD education, mailed living well with COPD, encouraged smoking cessation enrollment, encouraged proper wound care.    Follow up plan: appointment compliance, medication compliance.  JUANITA Zhu

## 2018-08-22 ENCOUNTER — OFFICE VISIT (OUTPATIENT)
Dept: FAMILY MEDICINE | Facility: CLINIC | Age: 68
End: 2018-08-22
Payer: MEDICARE

## 2018-08-22 ENCOUNTER — HOSPITAL ENCOUNTER (INPATIENT)
Facility: HOSPITAL | Age: 68
LOS: 6 days | Discharge: HOME HEALTH | DRG: 871 | End: 2018-08-28
Attending: EMERGENCY MEDICINE | Admitting: HOSPITALIST
Payer: MEDICARE

## 2018-08-22 ENCOUNTER — DOCUMENTATION ONLY (OUTPATIENT)
Dept: FAMILY MEDICINE | Facility: CLINIC | Age: 68
End: 2018-08-22

## 2018-08-22 VITALS
DIASTOLIC BLOOD PRESSURE: 60 MMHG | SYSTOLIC BLOOD PRESSURE: 120 MMHG | WEIGHT: 274.69 LBS | HEIGHT: 68 IN | HEART RATE: 69 BPM | BODY MASS INDEX: 41.63 KG/M2 | TEMPERATURE: 98 F

## 2018-08-22 DIAGNOSIS — E11.59 HYPERTENSION ASSOCIATED WITH DIABETES: ICD-10-CM

## 2018-08-22 DIAGNOSIS — K21.9 GASTROESOPHAGEAL REFLUX DISEASE, ESOPHAGITIS PRESENCE NOT SPECIFIED: ICD-10-CM

## 2018-08-22 DIAGNOSIS — E11.69 HYPERLIPIDEMIA ASSOCIATED WITH TYPE 2 DIABETES MELLITUS: ICD-10-CM

## 2018-08-22 DIAGNOSIS — J98.4 MIXED RESTRICTIVE AND OBSTRUCTIVE LUNG DISEASE: ICD-10-CM

## 2018-08-22 DIAGNOSIS — I20.9 ANGINA PECTORIS: ICD-10-CM

## 2018-08-22 DIAGNOSIS — A41.9 SEPSIS: ICD-10-CM

## 2018-08-22 DIAGNOSIS — E11.8 TYPE 2 DIABETES MELLITUS WITH COMPLICATION, WITHOUT LONG-TERM CURRENT USE OF INSULIN: ICD-10-CM

## 2018-08-22 DIAGNOSIS — J42 CHRONIC BRONCHITIS, UNSPECIFIED CHRONIC BRONCHITIS TYPE: ICD-10-CM

## 2018-08-22 DIAGNOSIS — F41.9 ANXIETY: ICD-10-CM

## 2018-08-22 DIAGNOSIS — M51.36 DDD (DEGENERATIVE DISC DISEASE), LUMBAR: Chronic | ICD-10-CM

## 2018-08-22 DIAGNOSIS — F33.9 MAJOR DEPRESSION, RECURRENT, CHRONIC: ICD-10-CM

## 2018-08-22 DIAGNOSIS — I70.0 ABDOMINAL AORTIC ATHEROSCLEROSIS: ICD-10-CM

## 2018-08-22 DIAGNOSIS — E78.5 HYPERLIPIDEMIA ASSOCIATED WITH TYPE 2 DIABETES MELLITUS: ICD-10-CM

## 2018-08-22 DIAGNOSIS — E66.01 MORBID OBESITY WITH BMI OF 40.0-44.9, ADULT: ICD-10-CM

## 2018-08-22 DIAGNOSIS — D50.9 IRON DEFICIENCY ANEMIA, UNSPECIFIED IRON DEFICIENCY ANEMIA TYPE: ICD-10-CM

## 2018-08-22 DIAGNOSIS — I48.20 CHRONIC ATRIAL FIBRILLATION: ICD-10-CM

## 2018-08-22 DIAGNOSIS — I50.42 CHRONIC COMBINED SYSTOLIC AND DIASTOLIC CHF (CONGESTIVE HEART FAILURE): ICD-10-CM

## 2018-08-22 DIAGNOSIS — I73.9 PVD (PERIPHERAL VASCULAR DISEASE): ICD-10-CM

## 2018-08-22 DIAGNOSIS — M79.3 PANNICULITIS: ICD-10-CM

## 2018-08-22 DIAGNOSIS — J96.12 HYPERCAPNIC RESPIRATORY FAILURE, CHRONIC: ICD-10-CM

## 2018-08-22 DIAGNOSIS — E11.40 TYPE 2 DIABETES MELLITUS WITH DIABETIC NEUROPATHY, WITHOUT LONG-TERM CURRENT USE OF INSULIN: Chronic | ICD-10-CM

## 2018-08-22 DIAGNOSIS — Z00.00 ENCOUNTER FOR PREVENTIVE HEALTH EXAMINATION: Primary | ICD-10-CM

## 2018-08-22 DIAGNOSIS — L03.311 CELLULITIS OF ABDOMINAL WALL: Primary | ICD-10-CM

## 2018-08-22 DIAGNOSIS — J43.9 MIXED RESTRICTIVE AND OBSTRUCTIVE LUNG DISEASE: ICD-10-CM

## 2018-08-22 DIAGNOSIS — I15.2 HYPERTENSION ASSOCIATED WITH DIABETES: ICD-10-CM

## 2018-08-22 DIAGNOSIS — F19.20 DEPENDENCY ON PAIN MEDICATION: ICD-10-CM

## 2018-08-22 DIAGNOSIS — I50.9 CONGESTIVE HEART FAILURE, UNSPECIFIED HF CHRONICITY, UNSPECIFIED HEART FAILURE TYPE: ICD-10-CM

## 2018-08-22 PROBLEM — Z86.2 HISTORY OF ANEMIA: Status: RESOLVED | Noted: 2018-03-14 | Resolved: 2018-08-22

## 2018-08-22 LAB
ALBUMIN SERPL BCP-MCNC: 3.5 G/DL
ALP SERPL-CCNC: 89 U/L
ALT SERPL W/O P-5'-P-CCNC: 16 U/L
ANION GAP SERPL CALC-SCNC: 10 MMOL/L
APTT BLDCRRT: 23.7 SEC
AST SERPL-CCNC: 18 U/L
BASOPHILS # BLD AUTO: 0 K/UL
BASOPHILS NFR BLD: 0.2 %
BILIRUB SERPL-MCNC: 0.4 MG/DL
BILIRUB UR QL STRIP: NEGATIVE
BNP SERPL-MCNC: 76 PG/ML
BUN SERPL-MCNC: 12 MG/DL
CALCIUM SERPL-MCNC: 9.8 MG/DL
CHLORIDE SERPL-SCNC: 100 MMOL/L
CLARITY UR: CLEAR
CO2 SERPL-SCNC: 29 MMOL/L
COLOR UR: YELLOW
CREAT SERPL-MCNC: 0.8 MG/DL
DIFFERENTIAL METHOD: ABNORMAL
EOSINOPHIL # BLD AUTO: 0.2 K/UL
EOSINOPHIL NFR BLD: 1.4 %
ERYTHROCYTE [DISTWIDTH] IN BLOOD BY AUTOMATED COUNT: 16.9 %
EST. GFR  (AFRICAN AMERICAN): >60 ML/MIN/1.73 M^2
EST. GFR  (NON AFRICAN AMERICAN): >60 ML/MIN/1.73 M^2
GLUCOSE SERPL-MCNC: 197 MG/DL
GLUCOSE UR QL STRIP: NEGATIVE
HCT VFR BLD AUTO: 39.5 %
HGB BLD-MCNC: 12.5 G/DL
HGB UR QL STRIP: ABNORMAL
INR PPP: 1
KETONES UR QL STRIP: NEGATIVE
LACTATE SERPL-SCNC: 1.7 MMOL/L
LACTATE SERPL-SCNC: 1.8 MMOL/L
LEUKOCYTE ESTERASE UR QL STRIP: NEGATIVE
LIPASE SERPL-CCNC: 48 U/L
LYMPHOCYTES # BLD AUTO: 0.8 K/UL
LYMPHOCYTES NFR BLD: 4.9 %
MAGNESIUM SERPL-MCNC: 1.7 MG/DL
MCH RBC QN AUTO: 29.1 PG
MCHC RBC AUTO-ENTMCNC: 31.7 G/DL
MCV RBC AUTO: 92 FL
MONOCYTES # BLD AUTO: 1.2 K/UL
MONOCYTES NFR BLD: 7.5 %
NEUTROPHILS # BLD AUTO: 13.9 K/UL
NEUTROPHILS NFR BLD: 86 %
NITRITE UR QL STRIP: NEGATIVE
PH UR STRIP: 6 [PH] (ref 5–8)
PHOSPHATE SERPL-MCNC: 1.8 MG/DL
PLATELET # BLD AUTO: 271 K/UL
PMV BLD AUTO: 8.6 FL
POCT GLUCOSE: 135 MG/DL (ref 70–110)
POTASSIUM SERPL-SCNC: 5.3 MMOL/L
PROCALCITONIN SERPL IA-MCNC: 0.07 NG/ML
PROT SERPL-MCNC: 7.6 G/DL
PROT UR QL STRIP: NEGATIVE
PROTHROMBIN TIME: 10.3 SEC
RBC # BLD AUTO: 4.3 M/UL
SODIUM SERPL-SCNC: 139 MMOL/L
SP GR UR STRIP: 1.01 (ref 1–1.03)
TROPONIN I SERPL DL<=0.01 NG/ML-MCNC: <0.006 NG/ML
URN SPEC COLLECT METH UR: ABNORMAL
UROBILINOGEN UR STRIP-ACNC: NEGATIVE EU/DL
WBC # BLD AUTO: 16.2 K/UL

## 2018-08-22 PROCEDURE — 96361 HYDRATE IV INFUSION ADD-ON: CPT

## 2018-08-22 PROCEDURE — 99499 UNLISTED E&M SERVICE: CPT | Mod: S$GLB,,, | Performed by: PHYSICIAN ASSISTANT

## 2018-08-22 PROCEDURE — 85610 PROTHROMBIN TIME: CPT

## 2018-08-22 PROCEDURE — 3074F SYST BP LT 130 MM HG: CPT | Mod: CPTII,S$GLB,, | Performed by: PHYSICIAN ASSISTANT

## 2018-08-22 PROCEDURE — 87077 CULTURE AEROBIC IDENTIFY: CPT

## 2018-08-22 PROCEDURE — 11000001 HC ACUTE MED/SURG PRIVATE ROOM

## 2018-08-22 PROCEDURE — 3078F DIAST BP <80 MM HG: CPT | Mod: CPTII,S$GLB,, | Performed by: PHYSICIAN ASSISTANT

## 2018-08-22 PROCEDURE — 99285 EMERGENCY DEPT VISIT HI MDM: CPT | Mod: 25

## 2018-08-22 PROCEDURE — 83605 ASSAY OF LACTIC ACID: CPT

## 2018-08-22 PROCEDURE — 96366 THER/PROPH/DIAG IV INF ADDON: CPT

## 2018-08-22 PROCEDURE — 83690 ASSAY OF LIPASE: CPT

## 2018-08-22 PROCEDURE — 25000003 PHARM REV CODE 250: Performed by: EMERGENCY MEDICINE

## 2018-08-22 PROCEDURE — 99999 PR PBB SHADOW E&M-EST. PATIENT-LVL III: CPT | Mod: PBBFAC,,, | Performed by: PHYSICIAN ASSISTANT

## 2018-08-22 PROCEDURE — 63600175 PHARM REV CODE 636 W HCPCS: Performed by: EMERGENCY MEDICINE

## 2018-08-22 PROCEDURE — 87040 BLOOD CULTURE FOR BACTERIA: CPT

## 2018-08-22 PROCEDURE — 96365 THER/PROPH/DIAG IV INF INIT: CPT

## 2018-08-22 PROCEDURE — 87086 URINE CULTURE/COLONY COUNT: CPT

## 2018-08-22 PROCEDURE — 81003 URINALYSIS AUTO W/O SCOPE: CPT

## 2018-08-22 PROCEDURE — 83880 ASSAY OF NATRIURETIC PEPTIDE: CPT

## 2018-08-22 PROCEDURE — 3044F HG A1C LEVEL LT 7.0%: CPT | Mod: CPTII,S$GLB,, | Performed by: PHYSICIAN ASSISTANT

## 2018-08-22 PROCEDURE — 87186 SC STD MICRODIL/AGAR DIL: CPT

## 2018-08-22 PROCEDURE — 84100 ASSAY OF PHOSPHORUS: CPT

## 2018-08-22 PROCEDURE — 85730 THROMBOPLASTIN TIME PARTIAL: CPT

## 2018-08-22 PROCEDURE — 85025 COMPLETE CBC W/AUTO DIFF WBC: CPT

## 2018-08-22 PROCEDURE — 36415 COLL VENOUS BLD VENIPUNCTURE: CPT

## 2018-08-22 PROCEDURE — 80053 COMPREHEN METABOLIC PANEL: CPT

## 2018-08-22 PROCEDURE — 84145 PROCALCITONIN (PCT): CPT

## 2018-08-22 PROCEDURE — 87088 URINE BACTERIA CULTURE: CPT

## 2018-08-22 PROCEDURE — G0439 PPPS, SUBSEQ VISIT: HCPCS | Mod: S$GLB,,, | Performed by: PHYSICIAN ASSISTANT

## 2018-08-22 PROCEDURE — 83735 ASSAY OF MAGNESIUM: CPT

## 2018-08-22 PROCEDURE — 93005 ELECTROCARDIOGRAM TRACING: CPT

## 2018-08-22 PROCEDURE — 84484 ASSAY OF TROPONIN QUANT: CPT

## 2018-08-22 PROCEDURE — 96368 THER/DIAG CONCURRENT INF: CPT

## 2018-08-22 PROCEDURE — 93010 ELECTROCARDIOGRAM REPORT: CPT | Mod: ,,, | Performed by: INTERNAL MEDICINE

## 2018-08-22 RX ORDER — NYSTATIN 100000 [USP'U]/G
POWDER TOPICAL 4 TIMES DAILY PRN
COMMUNITY
End: 2018-09-04 | Stop reason: SDUPTHER

## 2018-08-22 RX ORDER — IBUPROFEN 200 MG
16 TABLET ORAL
Status: DISCONTINUED | OUTPATIENT
Start: 2018-08-22 | End: 2018-08-28 | Stop reason: HOSPADM

## 2018-08-22 RX ORDER — PANTOPRAZOLE SODIUM 40 MG/1
40 TABLET, DELAYED RELEASE ORAL DAILY
Status: DISCONTINUED | OUTPATIENT
Start: 2018-08-23 | End: 2018-08-28 | Stop reason: HOSPADM

## 2018-08-22 RX ORDER — ACETAMINOPHEN 500 MG
1000 TABLET ORAL
Status: COMPLETED | OUTPATIENT
Start: 2018-08-22 | End: 2018-08-22

## 2018-08-22 RX ORDER — GLUCAGON 1 MG
1 KIT INJECTION
Status: DISCONTINUED | OUTPATIENT
Start: 2018-08-22 | End: 2018-08-28 | Stop reason: HOSPADM

## 2018-08-22 RX ORDER — INSULIN ASPART 100 [IU]/ML
1-10 INJECTION, SOLUTION INTRAVENOUS; SUBCUTANEOUS
Status: DISCONTINUED | OUTPATIENT
Start: 2018-08-22 | End: 2018-08-28 | Stop reason: HOSPADM

## 2018-08-22 RX ORDER — IBUPROFEN 200 MG
24 TABLET ORAL
Status: DISCONTINUED | OUTPATIENT
Start: 2018-08-22 | End: 2018-08-28 | Stop reason: HOSPADM

## 2018-08-22 RX ORDER — AMOXICILLIN 250 MG
1 CAPSULE ORAL 2 TIMES DAILY PRN
Status: DISCONTINUED | OUTPATIENT
Start: 2018-08-22 | End: 2018-08-28 | Stop reason: HOSPADM

## 2018-08-22 RX ORDER — SODIUM CHLORIDE 9 MG/ML
INJECTION, SOLUTION INTRAVENOUS CONTINUOUS
Status: DISCONTINUED | OUTPATIENT
Start: 2018-08-22 | End: 2018-08-28 | Stop reason: HOSPADM

## 2018-08-22 RX ORDER — ACETAMINOPHEN 325 MG/1
650 TABLET ORAL EVERY 6 HOURS PRN
Status: DISCONTINUED | OUTPATIENT
Start: 2018-08-22 | End: 2018-08-28 | Stop reason: HOSPADM

## 2018-08-22 RX ADMIN — ACETAMINOPHEN 1000 MG: 500 TABLET, FILM COATED ORAL at 09:08

## 2018-08-22 RX ADMIN — SODIUM CHLORIDE 1000 ML: 0.9 INJECTION, SOLUTION INTRAVENOUS at 05:08

## 2018-08-22 RX ADMIN — VANCOMYCIN HYDROCHLORIDE 2000 MG: 1 INJECTION, POWDER, LYOPHILIZED, FOR SOLUTION INTRAVENOUS at 06:08

## 2018-08-22 RX ADMIN — PIPERACILLIN SODIUM AND TAZOBACTAM SODIUM 4.5 G: 4; .5 INJECTION, POWDER, FOR SOLUTION INTRAVENOUS at 06:08

## 2018-08-22 RX ADMIN — SODIUM CHLORIDE: 0.9 INJECTION, SOLUTION INTRAVENOUS at 09:08

## 2018-08-22 NOTE — ED PROVIDER NOTES
Encounter Date: 2018    SCRIBE #1 NOTE: I, Hortencia Carney, am scribing for, and in the presence of, Dr. Weinstein.       History     Chief Complaint   Patient presents with    Tremors     pt reports tremors that started PTA, reports wound to abd       Time seen by provider: 4:15 PM on 2018    Nelly Tian is a 67 y.o. female with DM, COPD, HTN, HLD, CHF, MI, and PNA who presents to the ED with an onset of tremors that began 30 minutes ago. Pt began shaking when she came home from a routine doctor visit, which is to assess her wound that has been on left side of her abdomen for several months. She is currently taking antibiotics for her wound. Pt endorses a runny nose and nausea. The patient denies coughing, vomiting, and a sore throat and any other symptoms at this time. She is a smoker. Pt's SHx includes a  section and Oophorectomy. Pt is allergic to Dilaudid.      The history is provided by the patient.     Review of patient's allergies indicates:   Allergen Reactions    Dilaudid [hydromorphone] Anaphylaxis     Other reaction(s): Anaphylaxis  Other reaction(s): Unknown     Past Medical History:   Diagnosis Date    *Atrial flutter     Angina pectoris 2017    Anxiety     Arthritis     Asthma     Atrial fibrillation     Back pain     Cataract     OD    CHF (congestive heart failure)     COPD (chronic obstructive pulmonary disease)     Depression     Diabetes mellitus     Emphysema of lung     Heart failure     Hepatomegaly 2/3/2016    Hernia     History of MI (myocardial infarction) 2016    Hypercapnic respiratory failure, chronic 2016    Hyperlipidemia     Hypertension     Iron deficiency anemia 2/3/2016    Myocardial infarction     Obesity     Peripheral vascular disease     Pneumonia     Polyneuropathy     Retinal detachment     OS    Septic shock 2017    Skin ulcer 3/18/2017    Tobacco dependence     Type II or unspecified type  diabetes mellitus with neurological manifestations, not stated as uncontrolled(250.60)      Past Surgical History:   Procedure Laterality Date    BREAST BIOPSY Left 10 yrs ago    benign    CATARACT EXTRACTION Left     OS      SECTION      x2    EYE SURGERY      HYSTERECTOMY      partial    OOPHORECTOMY      one ovary conserved    RETINAL DETACHMENT SURGERY      buckle --OS    TONSILLECTOMY       Family History   Problem Relation Age of Onset    Arthritis Father     Heart disease Father     Early death Sister 30        heart disease    Heart disease Sister 32        MI    Cancer Brother         Lung cancer    No Known Problems Daughter     Blindness Son         due to accident//    Arthritis Sister     Heart disease Sister     Crohn's disease Sister     Alcohol abuse Mother     Breast cancer Neg Hx     Glaucoma Neg Hx     Macular degeneration Neg Hx     Retinal detachment Neg Hx     Amblyopia Neg Hx     Diabetes Neg Hx     Cataracts Neg Hx     Hypertension Neg Hx     Strabismus Neg Hx     Stroke Neg Hx     Thyroid disease Neg Hx      Social History     Tobacco Use    Smoking status: Current Some Day Smoker     Packs/day: 0.25     Years: 50.00     Pack years: 12.50     Types: Cigarettes     Start date: 1968    Smokeless tobacco: Never Used   Substance Use Topics    Alcohol use: No     Alcohol/week: 0.0 oz    Drug use: No     Review of Systems   Constitutional: Negative for fever.   HENT: Positive for rhinorrhea. Negative for sore throat.    Respiratory: Negative for cough and shortness of breath.    Cardiovascular: Negative for chest pain.   Gastrointestinal: Positive for nausea. Negative for vomiting.   Genitourinary: Negative for dysuria.   Musculoskeletal: Negative for back pain.   Skin: Positive for wound. Negative for rash.   Neurological: Positive for tremors. Negative for weakness.   Hematological: Does not bruise/bleed easily.                   Physical Exam      Initial Vitals   BP Pulse Resp Temp SpO2   08/22/18 1601 08/22/18 1559 08/22/18 1559 08/22/18 1559 08/22/18 1559   (!) 155/83 (!) 120 20 99.5 °F (37.5 °C) 95 %      MAP       --                Physical Exam    Nursing note and vitals reviewed.  Constitutional: She appears well-developed.   Fever of 103.2.   HENT:   Head: Normocephalic and atraumatic.   Eyes: EOM are normal. Pupils are equal, round, and reactive to light.   Neck: Neck supple.   Cardiovascular: Normal rate, regular rhythm, normal heart sounds and intact distal pulses. Exam reveals no gallop and no friction rub.    No murmur heard.  Pulmonary/Chest: Breath sounds normal. No respiratory distress. She has no decreased breath sounds. She has no wheezes. She has no rhonchi. She has no rales.   Abdominal: Soft. Bowel sounds are normal. She exhibits no distension. There is no tenderness.   Left lateral abdominal pannus that is cover with a large bandage. The wound is open and draining.   Musculoskeletal: Normal range of motion.   Neurological: She is alert and oriented to person, place, and time.   Skin: Skin is warm and dry. There is erythema.   Scab on the right distal leg measuring 1 cm x 2 cm. Erythema of RLE.    Psychiatric: She has a normal mood and affect.         ED Course   Procedures  Labs Reviewed   CBC W/ AUTO DIFFERENTIAL - Abnormal; Notable for the following components:       Result Value    WBC 16.20 (*)     MCHC 31.7 (*)     RDW 16.9 (*)     MPV 8.6 (*)     Gran # (ANC) 13.9 (*)     Lymph # 0.8 (*)     Mono # 1.2 (*)     Gran% 86.0 (*)     Lymph% 4.9 (*)     All other components within normal limits   COMPREHENSIVE METABOLIC PANEL - Abnormal; Notable for the following components:    Potassium 5.3 (*)     Glucose 197 (*)     All other components within normal limits   PHOSPHORUS - Abnormal; Notable for the following components:    Phosphorus 1.8 (*)     All other components within normal limits   CULTURE, BLOOD   CULTURE, BLOOD   LACTIC  ACID, PLASMA   MAGNESIUM   APTT   PROTIME-INR   B-TYPE NATRIURETIC PEPTIDE   LIPASE   TROPONIN I   URINALYSIS, REFLEX TO URINE CULTURE   PROCALCITONIN   LACTIC ACID, PLASMA     EKG Readings: (Independently Interpreted)   Initial Reading: No STEMI. Heart Rate: 111.   Atrial Fibrillation with rapid ventricular response. No ST elevation or depression. Rightward axis. Q wave V1-V3.       Imaging Results          X-Ray Chest AP Portable (Final result)  Result time 08/22/18 17:17:26    Final result by Sruthi Pierre MD (08/22/18 17:17:26)                 Impression:      Chronic elevation of the right hemidiaphragm.  No acute cardiopulmonary disease identified or significant change compared to the prior exam.  Limited visualization of the left lung base on the portable film      Electronically signed by: Sruthi Pierre MD  Date:    08/22/2018  Time:    17:17             Narrative:    EXAMINATION:  XR CHEST AP PORTABLE    CLINICAL HISTORY:  Sepsis;    TECHNIQUE:  Single frontal view of the chest was performed.    COMPARISON:  6/19/2018    FINDINGS:  Right hemidiaphragm appears elevated.  Left hemidiaphragm is not visualized which can be attributed to the technique on the portable film and overlying soft tissues but with limited visualization of the left lung base.  As visualized the lungs appear clear and the chest not significantly changed.  Cardiomediastinal silhouette appears stable                                 Medical Decision Making:   History:   Old Medical Records: I decided to obtain old medical records.  Clinical Tests:   Lab Tests: Ordered and Reviewed  Radiological Study: Reviewed and Ordered  Medical Tests: Ordered and Reviewed  Patient presents and has signs and symptoms of sepsis.  She will be treated with IV antibiotics, wound care consult, and potentially surgical consult for debridement.  She will be given IV fluids, antibiotics after cultures, admitted to the hospital.            Chrisibjosé miguel  Attestation:   Scribe #1: I performed the above scribed service and the documentation accurately describes the services I performed. I attest to the accuracy of the note.    I, Dr. Geovanny Weinstein personally performed the services described in this documentation. All medical record entries made by the scribe were at my direction and in my presence.  I have reviewed the chart and agree that the record reflects my personal performance and is accurate and complete. Geovanny Weinstein MD.  2:47 AM 08/26/2018    DISCLAIMER: This note was prepared with Dragon NaturallySpeaking voice recognition transcription software. Garbled syntax, mangled pronouns, and other bizarre constructions may be attributed to that software system            Clinical Impression:   The primary encounter diagnosis was Cellulitis of abdominal wall. Diagnoses of Sepsis and Panniculitis were also pertinent to this visit.      Disposition:   Disposition: Admitted                        Geovanny Weinstein MD  08/26/18 0247

## 2018-08-22 NOTE — PROGRESS NOTES
"Nelly Tian presented for a  Medicare AWV and comprehensive Health Risk Assessment today. The following components were reviewed and updated:    · Medical history  · Family History  · Social history  · Allergies and Current Medications  · Health Risk Assessment  · Health Maintenance  · Care Team     ** See Completed Assessments for Annual Wellness Visit within the encounter summary.**       The following assessments were completed:  · Living Situation  · CAGE  · Depression Screening  · Timed Get Up and Go  · Whisper Test  · Cognitive Function Screening  · Nutrition Screening  · ADL Screening  · PAQ Screening    Vitals:    08/22/18 1058   BP: 120/60   BP Location: Right arm   Patient Position: Sitting   BP Method: Small (Manual)   Pulse: 69   Temp: 97.7 °F (36.5 °C)   TempSrc: Oral   Weight: 124.6 kg (274 lb 11.1 oz)   Height: 5' 8" (1.727 m)     Body mass index is 41.77 kg/m².  Physical Exam   Constitutional: She is oriented to person, place, and time. She appears well-developed and well-nourished.   Pulmonary/Chest: Effort normal.   Musculoskeletal:        Right foot: There is no deformity.        Left foot: There is no deformity.   Neurological: She is alert and oriented to person, place, and time.   Psychiatric: She has a normal mood and affect. Her behavior is normal. Judgment and thought content normal.                Diagnoses and health risks identified today and associated recommendations/orders:    Nelly was seen today for medicare awv.    Diagnoses and all orders for this visit:    Encounter for preventive health examination    Chronic atrial fibrillation  Comments:  Stable, continue current management    Morbid obesity with BMI of 40.0-44.9, adult  Comments:  Stable, will continue to monitor     Hypertension associated with diabetes  Comments:  Stable, continue current regimen    PVD (peripheral vascular disease)  Comments:  Stable, continue current management    Abdominal aortic " atherosclerosis  Comments:  Stable, continue current management    Type 2 diabetes mellitus with complication, without long-term current use of insulin  Comments:  Stable, continue current regimen  Orders:  -     Ambulatory Referral to Ophthalmology    Hyperlipidemia associated with type 2 diabetes mellitus  Comments:  Stable, continue current regimen    Mixed restrictive and obstructive lung disease  Comments:  Stable, continue current regimen    Hypercapnic respiratory failure, chronic  Comments:  Stable, continue current regimen    Chronic bronchitis, unspecified chronic bronchitis type  Comments:  Stable, continue current regimen    Angina pectoris  Comments:  Stable, continue current management    Congestive heart failure, unspecified HF chronicity, unspecified heart failure type  Comments:  Stable, continue current management    Type 2 diabetes mellitus with diabetic neuropathy, without long-term current use of insulin  Comments:  Stable, continue current management    Major depression, recurrent, chronic  Comments:  Stable, continue current management    Dependency on pain medication  Comments:  Stable, continue current management    Iron deficiency anemia, unspecified iron deficiency anemia type  Comments:  Stable, continue current management    DDD (degenerative disc disease), lumbar  Comments:  Stable, continue current management    Anxiety  Comments:  Stable, continue current management    Gastroesophageal reflux disease, esophagitis presence not specified  Comments:  Stable, continue current management    Other orders  -     Cancel: Diabetic Eye Screening Photo; Future        Provided Nelly with a 5-10 year written screening schedule and personal prevention plan. Recommendations were developed using the USPSTF age appropriate recommendations. Education, counseling, and referrals were provided as needed. After Visit Summary printed and given to patient which includes a list of additional  screenings\tests needed.    No Follow-up on file.    JIGNESH Frank     Patient readiness: acceptance and barriers:none    During the course of the visit the patient was educated and counseled about the following:     Diabetes:  Discussed general issues about diabetes pathophysiology and management.  Encouraged aerobic exercise.  Hypertension:   Regular aerobic exercise.  Obesity:   General weight loss/lifestyle modification strategies discussed (elicit support from others; identify saboteurs; non-food rewards, etc).    Goals: Diabetes: Maintain Hemoglobin A1C below 7, Hypertension: Reduce Blood Pressure and Obesity: Reduce calorie intake and BMI    Did patient meet goals/outcomes: No    The following self management tools provided: declined    Patient Instructions (the written plan) was given to the patient/family.     Time spent with patient: 55 minutes    Barriers to medications present (no )    Adverse reactions to current medications (no)    Over the counter medications reviewed (Yes)

## 2018-08-22 NOTE — PATIENT INSTRUCTIONS
Counseling and Referral of Other Preventative  (Italic type indicates deductible and co-insurance are waived)    Patient Name: Nelly Tian  Today's Date: 8/22/2018    Health Maintenance       Date Due Completion Date    Urine Drug Screen 04/25/2018 10/25/2017    Eye Exam 05/08/2018 5/8/2017    Override on 7/17/2013: Done    Influenza Vaccine 08/01/2018 10/25/2017    Hemoglobin A1c 12/13/2018 6/13/2018    Naloxone Prescription 12/19/2018 12/19/2017 (Declined)    Override on 12/19/2017: Declined    Foot Exam 06/02/2019 6/2/2018    Override on 7/15/2015: Done (Dr Meng)    Override on 11/13/2014: Done    Override on 7/17/2013: Done    Lipid Panel 06/13/2019 6/13/2018    High Dose Statin 08/22/2019 8/22/2018    Mammogram 08/23/2019 8/23/2017    DEXA SCAN 07/14/2020 7/14/2017    Pneumococcal (65+) (2 of 2 - PPSV23) 01/19/2021 5/11/2016    Colonoscopy 02/01/2021 2/1/2011 (Done)    Override on 2/1/2011: Done (per [facility])    TETANUS VACCINE 10/30/2023 10/30/2013        Orders Placed This Encounter   Procedures    Ambulatory Referral to Ophthalmology     The following information is provided to all patients.  This information is to help you find resources for any of the problems found today that may be affecting your health:                Living healthy guide: www.[facility].[place].gov      Understanding Diabetes: www.diabetes.org      Eating healthy: www.cdc.gov/healthyweight      CDC home safety checklist: www.cdc.gov/steadi/patient.html      Agency on Aging: www.goea.[place].gov      Alcoholics anonymous (AA): www.aa.org      Physical Activity: www.mary ann.nih.gov/sp1nvln      Tobacco use: www.quitwithusla.org

## 2018-08-22 NOTE — PROGRESS NOTES
Pre-Visit Chart Review  For Appointment Scheduled on 08/22/2018    Health Maintenance Due   Topic Date Due    Eye Exam  05/08/2018    Influenza Vaccine  08/01/2018

## 2018-08-22 NOTE — Clinical Note
Primary Care Providers:Constantine Bird MD, MD (General)Your patient was seen today for a HRA visit. Gap(s) in care (HEDIS gaps) have been identified during this visit that require additional testing and possible follow up.Orders Placed This Encounter    Ambulatory Referral to Ophthalmology        Referral Priority:Routine        Referral Type:Consultation        Referral Reason:Specialty Services Required        Requested Specialty:Ophthalmology        Number of Visits Requested:1These orders were placed using [facility] approved protocol and any results will be forwarded to your office for appropriate follow up. I have included a copy of my visit note; please review the note and feel free to contact me with any questions. Thank you for allowing me to participate in the care of your patients.JIGNESH Frank

## 2018-08-23 PROBLEM — L89.90 DECUBITUS ULCER: Status: ACTIVE | Noted: 2018-08-23

## 2018-08-23 PROBLEM — I50.42 CHRONIC COMBINED SYSTOLIC AND DIASTOLIC CHF (CONGESTIVE HEART FAILURE): Status: ACTIVE | Noted: 2018-08-23

## 2018-08-23 PROBLEM — E87.5 HYPERKALEMIA: Status: RESOLVED | Noted: 2017-04-23 | Resolved: 2018-08-23

## 2018-08-23 PROBLEM — A41.9 SEPSIS: Status: RESOLVED | Noted: 2017-04-05 | Resolved: 2018-08-23

## 2018-08-23 LAB
ALBUMIN SERPL BCP-MCNC: 2.6 G/DL
ALBUMIN SERPL BCP-MCNC: 2.6 G/DL
ALP SERPL-CCNC: 61 U/L
ALP SERPL-CCNC: 61 U/L
ALT SERPL W/O P-5'-P-CCNC: 15 U/L
ALT SERPL W/O P-5'-P-CCNC: 15 U/L
ANION GAP SERPL CALC-SCNC: 8 MMOL/L
ANION GAP SERPL CALC-SCNC: 8 MMOL/L
ANISOCYTOSIS BLD QL SMEAR: SLIGHT
ANISOCYTOSIS BLD QL SMEAR: SLIGHT
AST SERPL-CCNC: 23 U/L
AST SERPL-CCNC: 23 U/L
BASOPHILS NFR BLD: 0 %
BASOPHILS NFR BLD: 0 %
BILIRUB SERPL-MCNC: 0.6 MG/DL
BILIRUB SERPL-MCNC: 0.6 MG/DL
BUN SERPL-MCNC: 13 MG/DL
BUN SERPL-MCNC: 13 MG/DL
CALCIUM SERPL-MCNC: 8.6 MG/DL
CALCIUM SERPL-MCNC: 8.6 MG/DL
CHLORIDE SERPL-SCNC: 104 MMOL/L
CHLORIDE SERPL-SCNC: 104 MMOL/L
CO2 SERPL-SCNC: 26 MMOL/L
CO2 SERPL-SCNC: 26 MMOL/L
CREAT SERPL-MCNC: 0.7 MG/DL
CREAT SERPL-MCNC: 0.7 MG/DL
DIFFERENTIAL METHOD: ABNORMAL
DIFFERENTIAL METHOD: ABNORMAL
EOSINOPHIL NFR BLD: 0 %
EOSINOPHIL NFR BLD: 0 %
ERYTHROCYTE [DISTWIDTH] IN BLOOD BY AUTOMATED COUNT: 15.9 %
ERYTHROCYTE [DISTWIDTH] IN BLOOD BY AUTOMATED COUNT: 15.9 %
EST. GFR  (AFRICAN AMERICAN): >60 ML/MIN/1.73 M^2
EST. GFR  (AFRICAN AMERICAN): >60 ML/MIN/1.73 M^2
EST. GFR  (NON AFRICAN AMERICAN): >60 ML/MIN/1.73 M^2
EST. GFR  (NON AFRICAN AMERICAN): >60 ML/MIN/1.73 M^2
ESTIMATED AVG GLUCOSE: 114 MG/DL
GLUCOSE SERPL-MCNC: 108 MG/DL
GLUCOSE SERPL-MCNC: 108 MG/DL
HBA1C MFR BLD HPLC: 5.6 %
HCT VFR BLD AUTO: 34 %
HCT VFR BLD AUTO: 34 %
HGB BLD-MCNC: 11.4 G/DL
HGB BLD-MCNC: 11.4 G/DL
LYMPHOCYTES NFR BLD: 3 %
LYMPHOCYTES NFR BLD: 3 %
MAGNESIUM SERPL-MCNC: 1.8 MG/DL
MAGNESIUM SERPL-MCNC: 1.8 MG/DL
MCH RBC QN AUTO: 30.2 PG
MCH RBC QN AUTO: 30.2 PG
MCHC RBC AUTO-ENTMCNC: 33.6 G/DL
MCHC RBC AUTO-ENTMCNC: 33.6 G/DL
MCV RBC AUTO: 90 FL
MCV RBC AUTO: 90 FL
MONOCYTES NFR BLD: 13 %
MONOCYTES NFR BLD: 13 %
NEUTROPHILS NFR BLD: 79 %
NEUTROPHILS NFR BLD: 79 %
NEUTS BAND NFR BLD MANUAL: 5 %
NEUTS BAND NFR BLD MANUAL: 5 %
OVALOCYTES BLD QL SMEAR: ABNORMAL
OVALOCYTES BLD QL SMEAR: ABNORMAL
PHOSPHATE SERPL-MCNC: 3.2 MG/DL
PHOSPHATE SERPL-MCNC: 3.2 MG/DL
PLATELET # BLD AUTO: 193 K/UL
PLATELET # BLD AUTO: 193 K/UL
PLATELET BLD QL SMEAR: ABNORMAL
PLATELET BLD QL SMEAR: ABNORMAL
PMV BLD AUTO: 8.8 FL
PMV BLD AUTO: 8.8 FL
POCT GLUCOSE: 108 MG/DL (ref 70–110)
POCT GLUCOSE: 83 MG/DL (ref 70–110)
POCT GLUCOSE: 89 MG/DL (ref 70–110)
POCT GLUCOSE: 93 MG/DL (ref 70–110)
POIKILOCYTOSIS BLD QL SMEAR: SLIGHT
POIKILOCYTOSIS BLD QL SMEAR: SLIGHT
POTASSIUM SERPL-SCNC: 3.8 MMOL/L
POTASSIUM SERPL-SCNC: 3.8 MMOL/L
PROT SERPL-MCNC: 5.7 G/DL
PROT SERPL-MCNC: 5.7 G/DL
RBC # BLD AUTO: 3.78 M/UL
RBC # BLD AUTO: 3.78 M/UL
SODIUM SERPL-SCNC: 138 MMOL/L
SODIUM SERPL-SCNC: 138 MMOL/L
VANCOMYCIN TROUGH SERPL-MCNC: 15.7 UG/ML
WBC # BLD AUTO: 14.9 K/UL
WBC # BLD AUTO: 14.9 K/UL

## 2018-08-23 PROCEDURE — 36415 COLL VENOUS BLD VENIPUNCTURE: CPT

## 2018-08-23 PROCEDURE — 27000221 HC OXYGEN, UP TO 24 HOURS

## 2018-08-23 PROCEDURE — 97162 PT EVAL MOD COMPLEX 30 MIN: CPT

## 2018-08-23 PROCEDURE — 25000003 PHARM REV CODE 250: Performed by: HOSPITALIST

## 2018-08-23 PROCEDURE — 94761 N-INVAS EAR/PLS OXIMETRY MLT: CPT

## 2018-08-23 PROCEDURE — 83735 ASSAY OF MAGNESIUM: CPT

## 2018-08-23 PROCEDURE — 76937 US GUIDE VASCULAR ACCESS: CPT

## 2018-08-23 PROCEDURE — 83036 HEMOGLOBIN GLYCOSYLATED A1C: CPT

## 2018-08-23 PROCEDURE — 94640 AIRWAY INHALATION TREATMENT: CPT

## 2018-08-23 PROCEDURE — 63600175 PHARM REV CODE 636 W HCPCS: Performed by: EMERGENCY MEDICINE

## 2018-08-23 PROCEDURE — 25000003 PHARM REV CODE 250: Performed by: EMERGENCY MEDICINE

## 2018-08-23 PROCEDURE — 80202 ASSAY OF VANCOMYCIN: CPT

## 2018-08-23 PROCEDURE — 85007 BL SMEAR W/DIFF WBC COUNT: CPT

## 2018-08-23 PROCEDURE — G8979 MOBILITY GOAL STATUS: HCPCS | Mod: CJ

## 2018-08-23 PROCEDURE — 80053 COMPREHEN METABOLIC PANEL: CPT

## 2018-08-23 PROCEDURE — 36569 INSJ PICC 5 YR+ W/O IMAGING: CPT

## 2018-08-23 PROCEDURE — 11000001 HC ACUTE MED/SURG PRIVATE ROOM

## 2018-08-23 PROCEDURE — 25000003 PHARM REV CODE 250: Performed by: NURSE PRACTITIONER

## 2018-08-23 PROCEDURE — 63600175 PHARM REV CODE 636 W HCPCS: Performed by: HOSPITALIST

## 2018-08-23 PROCEDURE — 84100 ASSAY OF PHOSPHORUS: CPT

## 2018-08-23 PROCEDURE — 25500020 PHARM REV CODE 255

## 2018-08-23 PROCEDURE — 85027 COMPLETE CBC AUTOMATED: CPT

## 2018-08-23 PROCEDURE — G8978 MOBILITY CURRENT STATUS: HCPCS | Mod: CK

## 2018-08-23 PROCEDURE — C1751 CATH, INF, PER/CENT/MIDLINE: HCPCS

## 2018-08-23 PROCEDURE — 97116 GAIT TRAINING THERAPY: CPT

## 2018-08-23 PROCEDURE — 25000242 PHARM REV CODE 250 ALT 637 W/ HCPCS: Performed by: HOSPITALIST

## 2018-08-23 RX ORDER — LEVALBUTEROL INHALATION SOLUTION 0.63 MG/3ML
0.63 SOLUTION RESPIRATORY (INHALATION) EVERY 8 HOURS
Status: DISCONTINUED | OUTPATIENT
Start: 2018-08-23 | End: 2018-08-28 | Stop reason: HOSPADM

## 2018-08-23 RX ORDER — ADHESIVE BANDAGE
60 BANDAGE TOPICAL DAILY
Status: DISCONTINUED | OUTPATIENT
Start: 2018-08-23 | End: 2018-08-27

## 2018-08-23 RX ORDER — FLUTICASONE FUROATE AND VILANTEROL 100; 25 UG/1; UG/1
1 POWDER RESPIRATORY (INHALATION) DAILY
Status: DISCONTINUED | OUTPATIENT
Start: 2018-08-23 | End: 2018-08-28 | Stop reason: HOSPADM

## 2018-08-23 RX ORDER — CLONAZEPAM 1 MG/1
1 TABLET ORAL 2 TIMES DAILY PRN
Status: DISCONTINUED | OUTPATIENT
Start: 2018-08-23 | End: 2018-08-28 | Stop reason: HOSPADM

## 2018-08-23 RX ORDER — FLUTICASONE FUROATE AND VILANTEROL 100; 25 UG/1; UG/1
1 POWDER RESPIRATORY (INHALATION) DAILY
Status: DISCONTINUED | OUTPATIENT
Start: 2018-08-23 | End: 2018-08-23

## 2018-08-23 RX ORDER — BUDESONIDE 0.5 MG/2ML
0.5 INHALANT ORAL EVERY 12 HOURS
Status: DISCONTINUED | OUTPATIENT
Start: 2018-08-23 | End: 2018-08-23

## 2018-08-23 RX ORDER — TRAZODONE HYDROCHLORIDE 50 MG/1
150 TABLET ORAL NIGHTLY
Status: DISCONTINUED | OUTPATIENT
Start: 2018-08-23 | End: 2018-08-24

## 2018-08-23 RX ORDER — IBUPROFEN 400 MG/1
800 TABLET ORAL ONCE
Status: DISCONTINUED | OUTPATIENT
Start: 2018-08-23 | End: 2018-08-23

## 2018-08-23 RX ORDER — LEVALBUTEROL INHALATION SOLUTION 0.63 MG/3ML
0.63 SOLUTION RESPIRATORY (INHALATION) EVERY 8 HOURS
Status: DISCONTINUED | OUTPATIENT
Start: 2018-08-23 | End: 2018-08-23

## 2018-08-23 RX ORDER — SODIUM CHLORIDE 9 MG/ML
INJECTION, SOLUTION INTRAVENOUS
Status: DISPENSED
Start: 2018-08-23 | End: 2018-08-23

## 2018-08-23 RX ORDER — PANTOPRAZOLE SODIUM 40 MG/1
40 TABLET, DELAYED RELEASE ORAL DAILY
Status: DISCONTINUED | OUTPATIENT
Start: 2018-08-23 | End: 2018-08-23

## 2018-08-23 RX ORDER — ATORVASTATIN CALCIUM 20 MG/1
20 TABLET, FILM COATED ORAL DAILY
Status: DISCONTINUED | OUTPATIENT
Start: 2018-08-23 | End: 2018-08-28 | Stop reason: HOSPADM

## 2018-08-23 RX ORDER — MELOXICAM 7.5 MG/1
7.5 TABLET ORAL DAILY
Status: DISCONTINUED | OUTPATIENT
Start: 2018-08-23 | End: 2018-08-28 | Stop reason: HOSPADM

## 2018-08-23 RX ORDER — SPIRONOLACTONE 25 MG/1
25 TABLET ORAL DAILY
Status: DISCONTINUED | OUTPATIENT
Start: 2018-08-23 | End: 2018-08-23

## 2018-08-23 RX ORDER — LISINOPRIL 10 MG/1
20 TABLET ORAL DAILY
Status: DISCONTINUED | OUTPATIENT
Start: 2018-08-23 | End: 2018-08-23

## 2018-08-23 RX ORDER — BUDESONIDE 0.5 MG/2ML
0.5 INHALANT ORAL EVERY 12 HOURS
Status: DISCONTINUED | OUTPATIENT
Start: 2018-08-23 | End: 2018-08-28 | Stop reason: HOSPADM

## 2018-08-23 RX ORDER — IBUPROFEN 400 MG/1
800 TABLET ORAL EVERY 6 HOURS PRN
Status: DISCONTINUED | OUTPATIENT
Start: 2018-08-23 | End: 2018-08-28 | Stop reason: HOSPADM

## 2018-08-23 RX ORDER — POLYETHYLENE GLYCOL 3350 17 G/17G
17 POWDER, FOR SOLUTION ORAL NIGHTLY PRN
Status: DISCONTINUED | OUTPATIENT
Start: 2018-08-23 | End: 2018-08-28 | Stop reason: HOSPADM

## 2018-08-23 RX ORDER — ASCORBIC ACID 500 MG
500 TABLET ORAL NIGHTLY
Status: DISCONTINUED | OUTPATIENT
Start: 2018-08-23 | End: 2018-08-28 | Stop reason: HOSPADM

## 2018-08-23 RX ORDER — GABAPENTIN 400 MG/1
800 CAPSULE ORAL 3 TIMES DAILY
Status: DISCONTINUED | OUTPATIENT
Start: 2018-08-23 | End: 2018-08-28 | Stop reason: HOSPADM

## 2018-08-23 RX ORDER — HYDROCODONE BITARTRATE AND ACETAMINOPHEN 10; 325 MG/1; MG/1
1 TABLET ORAL EVERY 6 HOURS PRN
Status: DISCONTINUED | OUTPATIENT
Start: 2018-08-23 | End: 2018-08-28 | Stop reason: HOSPADM

## 2018-08-23 RX ADMIN — ATORVASTATIN CALCIUM 20 MG: 20 TABLET, FILM COATED ORAL at 08:08

## 2018-08-23 RX ADMIN — VANCOMYCIN HYDROCHLORIDE 2000 MG: 1 INJECTION, POWDER, LYOPHILIZED, FOR SOLUTION INTRAVENOUS at 06:08

## 2018-08-23 RX ADMIN — METOPROLOL SUCCINATE 12.5 MG: 25 TABLET, EXTENDED RELEASE ORAL at 08:08

## 2018-08-23 RX ADMIN — PANTOPRAZOLE SODIUM 40 MG: 40 TABLET, DELAYED RELEASE ORAL at 08:08

## 2018-08-23 RX ADMIN — PIPERACILLIN SODIUM AND TAZOBACTAM SODIUM 4.5 G: 4; .5 INJECTION, POWDER, FOR SOLUTION INTRAVENOUS at 02:08

## 2018-08-23 RX ADMIN — APIXABAN 5 MG: 2.5 TABLET, FILM COATED ORAL at 08:08

## 2018-08-23 RX ADMIN — VANCOMYCIN HYDROCHLORIDE 1500 MG: 1 INJECTION, POWDER, LYOPHILIZED, FOR SOLUTION INTRAVENOUS at 08:08

## 2018-08-23 RX ADMIN — CLONAZEPAM 1 MG: 1 TABLET ORAL at 10:08

## 2018-08-23 RX ADMIN — PIPERACILLIN SODIUM AND TAZOBACTAM SODIUM 4.5 G: 4; .5 INJECTION, POWDER, FOR SOLUTION INTRAVENOUS at 10:08

## 2018-08-23 RX ADMIN — LEVALBUTEROL HYDROCHLORIDE 0.63 MG: 0.63 SOLUTION RESPIRATORY (INHALATION) at 07:08

## 2018-08-23 RX ADMIN — GABAPENTIN 800 MG: 400 CAPSULE ORAL at 08:08

## 2018-08-23 RX ADMIN — LEVALBUTEROL HYDROCHLORIDE 0.63 MG: 0.63 SOLUTION RESPIRATORY (INHALATION) at 03:08

## 2018-08-23 RX ADMIN — GABAPENTIN 800 MG: 400 CAPSULE ORAL at 03:08

## 2018-08-23 RX ADMIN — TRAZODONE HYDROCHLORIDE 150 MG: 50 TABLET ORAL at 10:08

## 2018-08-23 RX ADMIN — BUDESONIDE 0.5 MG: 0.5 INHALANT RESPIRATORY (INHALATION) at 07:08

## 2018-08-23 RX ADMIN — PIPERACILLIN SODIUM AND TAZOBACTAM SODIUM 4.5 G: 4; .5 INJECTION, POWDER, FOR SOLUTION INTRAVENOUS at 01:08

## 2018-08-23 RX ADMIN — CLONAZEPAM 1 MG: 1 TABLET ORAL at 08:08

## 2018-08-23 RX ADMIN — THERA TABS 1 TABLET: TAB at 08:08

## 2018-08-23 RX ADMIN — IOHEXOL 100 ML: 350 INJECTION, SOLUTION INTRAVENOUS at 10:08

## 2018-08-23 RX ADMIN — ACETAMINOPHEN 650 MG: 325 TABLET, FILM COATED ORAL at 08:08

## 2018-08-23 RX ADMIN — HYDROCODONE BITARTRATE AND ACETAMINOPHEN 1 TABLET: 10; 325 TABLET ORAL at 08:08

## 2018-08-23 RX ADMIN — FLUTICASONE FUROATE AND VILANTEROL TRIFENATATE 1 PUFF: 100; 25 POWDER RESPIRATORY (INHALATION) at 07:08

## 2018-08-23 RX ADMIN — IOHEXOL 30 ML: 350 INJECTION, SOLUTION INTRAVENOUS at 10:08

## 2018-08-23 RX ADMIN — OXYCODONE HYDROCHLORIDE AND ACETAMINOPHEN 500 MG: 500 TABLET ORAL at 08:08

## 2018-08-23 RX ADMIN — IBUPROFEN 800 MG: 400 TABLET, FILM COATED ORAL at 12:08

## 2018-08-23 NOTE — CONSULTS
Nelly Tian [id number] is a 67 y.o. female who has been consulted for vancomycin dosing.    Vancomycin trough has been changed to 8/23/18 at 1800, 30 minutes prior to 3rd dose.      Patient will be followed by pharmacy for changes in renal function, toxicity, and efficacy.    Thank you for allowing us to participate in this patient's care.     Torsten Loza, PharmD

## 2018-08-23 NOTE — PLAN OF CARE
Problem: Patient Care Overview  Goal: Plan of Care Review  Outcome: Ongoing (interventions implemented as appropriate)  Plan of care reviewed with patient. Pt verbalized understanding safety maintained throughout the shift. Bed locked in lowest position, and call light within reach. Reinforced to call for assistance. Pt remained free of falls throughout shift. VS stable. Pt afebrile. POC glucose monitored. Sliding scale insulin given per order. Tele monitored Afib throughout shift. IV fluids maintained as ordered. IV antibiotics given per order. Bandages to abdomen clean try and intact. Will continue to monitor.

## 2018-08-23 NOTE — NURSING
Pt returned to unit from CT via wheelchair with rad tach.    Okay to order diabetic diet per Dr. Pa

## 2018-08-23 NOTE — CONSULTS
Nelly Tian [id number] is a 67 y.o. female who has been consulted for vancomycin dosing.    The patient has the following labs:     Date Creatinine (mg/dl)    BUN WBC Count   8/22/2018 Estimated Creatinine Clearance: 99.3 mL/min (based on SCr of 0.8 mg/dL). Lab Results   Component Value Date    BUN 12 08/22/2018     Lab Results   Component Value Date    WBC 16.20 (H) 08/22/2018        Current weight is 131.5 kg (290 lb)    Skin/soft tissue      The patient received  2000 mg on 8/22 at 1827.    The patient will be started on vancomycin at a dose of 2000  mg every 12 hours (15mg/kg/dose).  The vancomycin trough has been ordered for 8/24 at 0600.      Patient will be followed by pharmacy for changes in renal function, toxicity, and efficacy.   Thank you for allowing us to participate in this patient's care.     Ammy Carrillo

## 2018-08-23 NOTE — PLAN OF CARE
Problem: Patient Care Overview  Goal: Plan of Care Review  Outcome: Ongoing (interventions implemented as appropriate)  Pt AAO x4, PIV in place, IVF and abx administered throughout shift.  PRN antipyretics administered, adequate relief achieved.  Tele maintained and monitored.  Bilat LE elevated on pillow.  Weight shift assistance provided.  Continuous O2 by NC in place.  POCT ac/hs assessed, no SSI coverage administered.  VS stable at end of shift, monitored closely throughout.  Hourly rounding completed.  Pt has remained free of injuries and falls throughout shift.  Environment free of clutter, side rails up x2, and call light within reach.  Pt has verbalized understanding of plan of care.

## 2018-08-23 NOTE — PLAN OF CARE
08/23/18 0715   Patient Assessment/Suction   Level of Consciousness (AVPU) alert   Respiratory Effort Normal;Unlabored   Expansion/Accessory Muscles/Retractions no use of accessory muscles;no retractions;expansion symmetric   All Lung Fields Breath Sounds diminished   Rhythm/Pattern, Respiratory depth regular;pattern regular;unlabored   Cough Frequency infrequent   Cough Type good;nonproductive   PRE-TX-O2-ETCO2   O2 Device (Oxygen Therapy) nasal cannula   $ Is the patient on Low Flow Oxygen? Yes   Flow (L/min) 3   SpO2 96 %   Pulse Oximetry Type Intermittent   $ Pulse Oximetry - Multiple Charge Pulse Oximetry - Multiple   Pulse 80   Resp 18   Aerosol Therapy   $ Aerosol Therapy Charges Aerosol Treatment   Respiratory Treatment Status given   SVN/Inhaler Treatment Route mask   Position During Treatment HOB at 30 degrees   Patient Tolerance good   Post-Treatment   Post-treatment Heart Rate (beats/min) 80   Post-treatment Resp Rate (breaths/min) 18   All Fields Breath Sounds aeration increased       Aerosol treatments q8 with Xopenex, q12 with Pulmicort, and Breo q day. Patient tolerated well.

## 2018-08-23 NOTE — ASSESSMENT & PLAN NOTE
Monitor telemetry  Hold antidiuretics for now 2/2 sepsis  Monitor for s/s of decompensation  Last TTE 2/1/18 with EF 50%

## 2018-08-23 NOTE — ASSESSMENT & PLAN NOTE
Dangers of cigarette smoking were reviewed with patient in detail for 3 minutes and patient was encouraged to quit. Nicotine replacement options were discussed.  Pt declines nicotine patch.

## 2018-08-23 NOTE — ASSESSMENT & PLAN NOTE
Presented febrile, tachycardic, with WBC 41306.  IVF bolus 1000ml administered in ED; continue gentle hydration 2/2 hx CHF-monitor for s/s of overload.  This patient does have evidence of infective focus  My overall impression is sepsis.  Antibiotics given-   Antibiotics (From admission, onward)    Start     Stop Route Frequency Ordered    08/23/18 0630  vancomycin (VANCOCIN) 2,000 mg in dextrose 5 % 500 mL IVPB      -- IV Every 12 hours (non-standard times) 08/22/18 2114    08/23/18 0200  piperacillin-tazobactam 4.5 g in dextrose 5 % 100 mL IVPB (ready to mix system)      -- IV Every 8 hours (non-standard times) 08/22/18 2054          Latest lactate reviewed, they are-  Recent Labs   Lab  08/22/18   1704  08/22/18   2026   LACTATE  1.7  1.8       Source- abdominal wound-culture  Blood cultures collected in ED

## 2018-08-23 NOTE — NURSING
Pt refuses CT scan, she states she does not fit in the machine and it is painful for her to get on machine.  Education provided to pt.  On call practictioner notified.

## 2018-08-23 NOTE — ASSESSMENT & PLAN NOTE
Chronic; controlled.  Last A1c 6.9 on 6/13/18  Hold metformin  Accucheck ac/hs with SSI coverage as needed  Check current Hgb [id number] wound

## 2018-08-23 NOTE — H&P
"[facility] Medicine  History & Physical    Patient Name: Nelly Tian  MRN: [id number]  Admission Date: 8/22/2018  Attending Physician: Diaz Pa MD   Primary Care Provider: Constantine Bird MD         Patient information was obtained from patient, past medical records and ER records.     Subjective:     Principal Problem:Cellulitis of abdominal wall    Chief Complaint:   Chief Complaint   Patient presents with    Tremors     pt reports tremors that started PTA, reports wound to abd        HPI: Nelly Tian is a 68 y/o female with a PMHx of DM2, HTN, COPD, HLD, CHF, and chronic Afib who presented to the ED with reports of fever and tremors which began suddenly this afternoon.  She had a routine clinic appointment this morning which she states was a "wellness visit" with no symptoms at that time.  She has a history of a non-healing stage 3 pressure wound to the left abdominal wall which is being treated at [facility].  She was seen on 8/17/18 by her PCP for an ulcer to the RLE and was placed on doxycycline with which she reports compliance and states that the wound is improving.  Pt denies headache, chest pain, unusual SOB, diarrhea or dysuria.  She did have some nausea but no vomiting. While in the ED, Ms. Tian was found to meet sepsis criteria and is admitted to the service of hospital medicine for further treatment.    Past Medical History:   Diagnosis Date    *Atrial flutter     Angina pectoris 9/18/2017    Anxiety     Arthritis     Asthma     Atrial fibrillation     Back pain     Cataract     OD    CHF (congestive heart failure)     COPD (chronic obstructive pulmonary disease)     Depression     Diabetes mellitus     Emphysema of lung     Heart failure     Hepatomegaly 2/3/2016    Hernia     History of MI (myocardial infarction) 1/19/2016    Hypercapnic respiratory failure, chronic 11/16/2016    Hyperlipidemia     Hypertension     Iron deficiency anemia " 2/3/2016    Myocardial infarction     Obesity     Peripheral vascular disease     Pneumonia     Polyneuropathy     Retinal detachment     OS    Septic shock 2017    Skin ulcer 3/18/2017    Tobacco dependence     Type II 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, not stated as uncontrolled(250.60)        Past Surgical History:   Procedure Laterality Date    BREAST BIOPSY Left 10 yrs ago    benign    CATARACT EXTRACTION Left     OS      SECTION      x2    EYE SURGERY      HYSTERECTOMY      partial    OOPHORECTOMY      one ovary conserved    RETINAL DETACHMENT SURGERY      buckle --OS    TONSILLECTOMY         Review of patient's allergies indicates:   Allergen Reactions    Dilaudid [hydromorphone] Anaphylaxis     Other reaction(s): Anaphylaxis  Other reaction(s): Unknown       No current facility-administered medications on file prior to encounter.      Current Outpatient Medications on File Prior to Encounter   Medication Sig    albuterol (ACCUNEB) 0.63 mg/3 mL Nebu INHALE THE CONTENTS OF 1 VIAL VIA NEBULIZER EVERY 6 HOURS AS NEEDED    albuterol (VENTOLIN HFA) 90 mcg/actuation inhaler Inhale 2 puffs into the lungs every 6 (six) hours as needed. Rescue    apixaban (ELIQUIS) 5 mg Tab Take 1 tablet (5 mg total) by mouth 2 (two) times daily.    ascorbic acid, vitamin C, (VITAMIN C) 500 MG tablet Take 1 tablet (500 mg total) by mouth every evening.    aspirin (ECOTRIN) 81 MG EC tablet Take 81 mg by mouth once daily.    atorvastatin (LIPITOR) 20 MG tablet TAKE 1 TABLET EVERY DAY    BREO ELLIPTA 100-25 mcg/dose diskus inhaler INHALE 1 PUFF INTO THE LUNGS ONE TIME DAILY (CONTROLLER)    budesonide (PULMICORT) 0.5 mg/2 mL nebulizer solution INHALE THE CONTENTS OF 1 VIAL VIA NEBULIZER EVERY DAY    clonazePAM (KLONOPIN) 1 MG tablet 1 tab po bid prn (Patient taking differently: Take 1 mg by mouth 2 (two) times daily as needed for Anxiety. 1 tab po bid prn)     doxycycline (VIBRAMYCIN) 100 MG Cap Take 1 capsule (100 mg total) by mouth every 12 (twelve) hours.    furosemide (LASIX) 40 MG tablet TAKE 1 TABLET ONE TIME DAILY  FOR  FLUID  OVERLOAD (Patient taking differently: TAKE 1 TABLET twice DAILY  FOR  FLUID  OVERLOAD)    gabapentin (NEURONTIN) 800 MG tablet Take 1 tablet (800 mg total) by mouth 3 (three) times daily.    HYDROcodone-acetaminophen (NORCO)  mg per tablet Take 1 tablet by mouth every 8 (eight) hours as needed for Pain (Do not take more than 3 a day. Do not mix with other narcotics.).    KLOR-CON M20 20 mEq tablet TAKE ONE TABLET BY MOUTH ONCE DAILY    levalbuterol (XOPENEX) 0.63 mg/3 mL nebulizer solution INHALE THE CONTENTS OF 1 VIAL BY NEBULIZATION 4 times  DAILY.    DX: J43.9    lisinopril (PRINIVIL,ZESTRIL) 20 MG tablet TAKE 1 TABLET EVERY DAY    metFORMIN (GLUCOPHAGE) 500 MG tablet Take 1 tablet (500 mg total) by mouth 2 (two) times daily with meals.    metoprolol succinate (TOPROL-XL) 25 MG 24 hr tablet TAKE 1/2 TABLET EVERY DAY    multivitamin (THERAGRAN) tablet Take 1 tablet by mouth once daily.    nystatin (MYCOSTATIN) powder Apply topically 4 (four) times daily as needed.    nystatin-triamcinolone (MYCOLOG II) cream APPLY SPARINGLY TO AFFECTED AREA(S) 3 TIMES DAILY AS NEEDED    omeprazole (PRILOSEC) 20 MG capsule Take 1 capsule (20 mg total) by mouth once daily.    polyethylene glycol (GLYCOLAX) 17 gram/dose powder Take 17 g by mouth nightly as needed.    spironolactone (ALDACTONE) 25 MG tablet Take 1 tablet (25 mg total) by mouth once daily.    meloxicam (MOBIC) 7.5 MG tablet TAKE 1 TABLET EVERY DAY    silver sulfADIAZINE 1% (SILVADENE) 1 % cream Apply topically once daily.    traZODone (DESYREL) 150 MG tablet TAKE 1 TABLET EVERY EVENING.         Family History     Problem Relation (Age of Onset)    Alcohol abuse Mother    Arthritis Father, Sister    Blindness Son    Cancer Brother    Crohn's disease Sister    Early death  Sister (30)    Heart disease Father, Sister (32), Sister    No Known Problems Daughter        Tobacco Use    Smoking status: Current Some Day Smoker     Packs/day: 0.25     Years: 50.00     Pack years: 12.50     Types: Cigarettes     Start date: 1/19/1968    Smokeless tobacco: Never Used   Substance and Sexual Activity    Alcohol use: No     Alcohol/week: 0.0 oz     Frequency: Never    Drug use: No    Sexual activity: Not Currently     Review of Systems   Constitutional: Positive for chills and fever.   HENT: Positive for rhinorrhea. Negative for sore throat and trouble swallowing.    Eyes: Negative for photophobia and visual disturbance.   Respiratory: Negative for cough, shortness of breath and wheezing.    Cardiovascular: Negative for chest pain and palpitations.   Gastrointestinal: Positive for abdominal pain and nausea. Negative for diarrhea and vomiting.   Endocrine: Negative for polyphagia and polyuria.   Genitourinary: Negative for dysuria and frequency.   Musculoskeletal: Negative for back pain and neck pain.   Skin: Positive for color change and wound.   Neurological: Positive for weakness. Negative for light-headedness.   Hematological: Negative for adenopathy. Bruises/bleeds easily.   Psychiatric/Behavioral: Negative for agitation and confusion.   All other systems reviewed and are negative.    Objective:     Vital Signs (Most Recent):  Temp: (!) 101.3 °F (38.5 °C) (08/22/18 2315)  Pulse: 95 (08/22/18 2315)  Resp: 20 (08/22/18 2315)  BP: (!) 110/51 (08/22/18 2315)  SpO2: (!) 93 % (08/22/18 2315) Vital Signs (24h Range):  Temp:  [97.7 °F (36.5 °C)-103.1 °F (39.5 °C)] 101.3 °F (38.5 °C)  Pulse:  [] 95  Resp:  [18-20] 20  SpO2:  [89 %-95 %] 93 %  BP: ()/(43-83) 110/51     Weight: 131.5 kg (289 lb 14.5 oz)  Body mass index is 44.08 kg/m².    Physical Exam   Constitutional: She is oriented to person, place, and time. She appears well-developed and well-nourished.   Morbidly obese   HENT:    Head: Normocephalic and atraumatic.   Mouth/Throat: Oropharynx is clear and moist.   Eyes: Conjunctivae and EOM are normal. Pupils are equal, round, and reactive to light.   Neck: Normal range of motion. Neck supple. No JVD present. No tracheal deviation present.   Cardiovascular: Normal heart sounds. An irregularly irregular rhythm present. Tachycardia present.   Pulmonary/Chest: Effort normal and breath sounds normal.   Abdominal: Soft. Bowel sounds are normal. There is tenderness.   Genitourinary:   Genitourinary Comments: deferred   Musculoskeletal: Normal range of motion. She exhibits no edema.   Neurological: She is alert and oriented to person, place, and time.   Skin: Skin is warm and dry. There is erythema.   Left lateral abdominal pannus that is cover with a large bandage. The wound is open and draining. Scab on the right distal leg measuring 1 cm x 2 cm. Erythema of RLE.    Nursing note and vitals reviewed.        CRANIAL NERVES     CN III, IV, VI   Pupils are equal, round, and reactive to light.  Extraocular motions are normal.                Significant Labs:   CBC:   Recent Labs   Lab  08/22/18   1704   WBC  16.20*   HGB  12.5   HCT  39.5   PLT  271     CMP:   Recent Labs   Lab  08/22/18   1704   NA  139   K  5.3*   CL  100   CO2  29   GLU  197*   BUN  12   CREATININE  0.8   CALCIUM  9.8   PROT  7.6   ALBUMIN  3.5   BILITOT  0.4   ALKPHOS  89   AST  18   ALT  16   ANIONGAP  10   EGFRNONAA  >60     Lactic Acid:   Recent Labs   Lab  08/22/18   1704  08/22/18   2026   LACTATE  1.7  1.8     Urine Studies:   Recent Labs   Lab  08/22/18   1751   COLORU  Yellow   APPEARANCEUA  Clear   PHUR  6.0   SPECGRAV  1.015   PROTEINUA  Negative   GLUCUA  Negative   KETONESU  Negative   BILIRUBINUA  Negative   OCCULTUA  Trace*   NITRITE  Negative   UROBILINOGEN  Negative   LEUKOCYTESUR  Negative       Significant Imaging: CXR: I have reviewed all pertinent results/findings within the past 24 hours and my personal  findings are:  No acute process.    Assessment/Plan:     * Cellulitis of abdominal wall    Pt with chronic non-healing stage 3 pressure ulcer presents meeting sepsis criteria.  Has been taking doxycycline since 8/17/18 for cellulitis RLE, which pt states has improved.    IV zosyn and IV vancomycin initiated in ED-continue  CT A/P in AM  NPO for now.            Chronic combined systolic and diastolic CHF (congestive heart failure)    Monitor telemetry  Hold antidiuretics for now 2/2 sepsis  Monitor for s/s of decompensation  Last TTE 2/1/18 with EF 50%        Hyperkalemia    Potassium 5.3 upon admission.  Repeat level in am; treat as clinically indicated.  Monitor telemetry.          COPD (chronic obstructive pulmonary disease)    Chronic; controlled.  Continue home medications.  Supplemental O2 as needed.          Sepsis    Presented febrile, tachycardic, with WBC 92184.  IVF bolus 1000ml administered in ED; continue gentle hydration 2/2 hx CHF-monitor for s/s of overload.  This patient does have evidence of infective focus  My overall impression is sepsis.  Antibiotics given-   Antibiotics (From admission, onward)    Start     Stop Route Frequency Ordered    08/23/18 0630  vancomycin (VANCOCIN) 2,000 mg in dextrose 5 % 500 mL IVPB      -- IV Every 12 hours (non-standard times) 08/22/18 2114    08/23/18 0200  piperacillin-tazobactam 4.5 g in dextrose 5 % 100 mL IVPB (ready to mix system)      -- IV Every 8 hours (non-standard times) 08/22/18 2054          Latest lactate reviewed, they are-  Recent Labs   Lab  08/22/18   1704  08/22/18   2026   LACTATE  1.7  1.8       Source- abdominal wound-culture  Blood cultures collected in ED             Anxiety    Chronic; continue clonazepam as needed.  Continue trazodone.        DDD (degenerative disc disease), lumbar    Chronic; continue chronic pain regimen.          Hypertension associated with diabetes    Chronic; appears controlled.  Will hold ACEI for now 2/2  sepsis.  Monitor and treat as clinically indicated.            Morbid obesity with BMI of 40.0-44.9, adult    Body mass index is 44.08 kg/m². Morbid obesity complicates all aspects of disease management from diagnostic modalities to treatment. Weight loss encouraged and health benefits explained to patient.              Chronic atrial fibrillation    Monitor telemetry  Continue beta blocker for rate control as bp tolerates  Continue eliquis          Tobacco dependency    Dangers of cigarette smoking were reviewed with patient in detail for 3 minutes and patient was encouraged to quit. Nicotine replacement options were discussed.  Pt declines nicotine patch.            GERD (gastroesophageal reflux disease)    Chronic; continue PPI          Long term current use of anticoagulant therapy    On eliquis 2/2 chronic atrial fibrillation.  Continue current therapy.          Diabetes mellitus with neuropathy    Chronic; controlled.  Last A1c 6.9 on 6/13/18  Hold metformin  Accucheck ac/hs with SSI coverage as needed  Check current Hgb [id number]-[id number] wound            VTE Risk Mitigation (From admission, onward)        Ordered     apixaban tablet 5 mg  2 times daily      08/23/18 0237     IP VTE HIGH RISK PATIENT  Once      08/23/18 0237     Reason for No Pharmacological VTE Prophylaxis  Once      08/23/18 0237      Time spent seeing patient( greater than 1/2 spent in direct contact) : 60 minutes       DARIEN Mooney  Department of Hospital Medicine   [facility]

## 2018-08-23 NOTE — PT/OT/SLP EVAL
Physical Therapy Evaluation    Patient Name:  Nelly Tian   MRN:  [id number]    Recommendations:     Discharge Recommendations:  home   Discharge Equipment Recommendations: none   Barriers to discharge: None    Assessment:     Nelly Tian is a 67 y.o. female admitted with a medical diagnosis of Cellulitis of abdominal wall.  She presents with the following impairments/functional limitations:  weakness, impaired endurance, impaired functional mobilty, impaired balance, pain, edema . Presents with obesity, swollen abdomen with low endurance to activity. Pt to benefit from continued therapies.    Rehab Prognosis:  fair; patient would benefit from acute skilled PT services to address these deficits and reach maximum level of function.      Recent Surgery: * No surgery found *      Plan:     During this hospitalization, patient to be seen 6 x/week to address the above listed problems via gait training, therapeutic activities, therapeutic exercises  · Plan of Care Expires:  09/07/18   Plan of Care Reviewed with: patient, daughter    Subjective     Communicated with nurse Lnyn prior to session.  Patient found supine upon PT entry to room, agreeable to evaluation.    Pt drinking contrast pending CT scan  Pt stated is able to move around in bed and around room  Stated uses O2 at nights    Chief Complaint: hungry  Patient comments/goals: get well  Pain/Comfort:  · Pain Rating 1: 8/10  · Location - Side 1: Left  · Location 1: abdomen    Patients cultural, spiritual, Judaism conflicts given the current situation:      Living Environment:  Home with daughter   4 steps to enter  Prior to admission, patients level of function was amb with rollator or cane, drives some and uses store scooter during outings.  Patient has the following equipment: rollator, cane, straight.  DME owned (not currently used): none.  Upon discharge, patient will have assistance from family.    Objective:     Patient found with:  peripheral IV, oxygen     General Precautions: Standard, fall   Orthopedic Precautions:N/A   Braces: N/A     Exams:  · Postural Exam:  Patient presented with the following abnormalities:    · -       Rounded shoulders  · -       Forward head  · -       big abdomen with wound L side  · RLE ROM: WFL  · RLE Strength: WFL  · LLE ROM: WFL  · LLE Strength: WFL    Functional Mobility:  · Bed Mobility:     · Rolling Right: supervision  · Supine to Sit: minimum assistance  · Transfers:     · Sit to Stand:  stand by assistance with no AD  · Gait: 40ft with HHA pushing IV pole    AM-PAC 6 CLICK MOBILITY  Total Score:16       Therapeutic Activities and Exercises:   pt encouraged LE's exercises  Commode use to void as  She does not fit using toilet  Pt left seated EOB- awaiting transport to CT scan    Patient left EOB with all lines intact, call button in reach and daughter present.    GOALS:   Multidisciplinary Problems     Physical Therapy Goals        Problem: Physical Therapy Goal    Goal Priority Disciplines Outcome Goal Variances Interventions   Physical Therapy Goal     PT, PT/OT Ongoing (interventions implemented as appropriate)     Description:  Goals to be met by: 2018     Patient will increase functional independence with mobility by performin. Sit to stand transfer with Supervision  2. Gait  x 250 feet with Contact Guard Assistance using Rolling Walker.   3. Lower extremity exercise program x20 reps per handout, with assistance as needed                      History:     Past Medical History:   Diagnosis Date    *Atrial flutter     Angina pectoris 2017    Anxiety     Arthritis     Asthma     Atrial fibrillation     Back pain     Cataract     OD    CHF (congestive heart failure)     COPD (chronic obstructive pulmonary disease)     Depression     Diabetes mellitus     Emphysema of lung     Heart failure     Hepatomegaly 2/3/2016    Hernia     History of MI (myocardial infarction)  2016    Hypercapnic respiratory failure, chronic 2016    Hyperlipidemia     Hypertension     Iron deficiency anemia 2/3/2016    Myocardial infarction     Obesity     Peripheral vascular disease     Pneumonia     Polyneuropathy     Retinal detachment     OS    Septic shock 2017    Skin ulcer 3/18/2017    Tobacco dependence     Type II 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, not stated as uncontrolled(250.60)        Past Surgical History:   Procedure Laterality Date    BREAST BIOPSY Left 10 yrs ago    benign    CATARACT EXTRACTION Left     OS      SECTION      x2    EYE SURGERY      HYSTERECTOMY      partial    OOPHORECTOMY      one ovary conserved    RETINAL DETACHMENT SURGERY      buckle --OS    TONSILLECTOMY         Clinical Decision Making:     History  Co-morbidities and personal factors that may impact the plan of care Examination  Body Structures and Functions, activity limitations and participation restrictions that may impact the plan of care Clinical Presentation   Decision Making/ Complexity Score   Co-morbidities:   [] Time since onset of injury / illness / exacerbation  [] Status of current condition  []Patient's cognitive status and safety concerns    [] Multiple Medical Problems (see med hx)  Personal Factors:   [] Patient's age  [] Prior Level of function   [] Patient's home situation (environment and family support)  [] Patient's level of motivation  [] Expected progression of patient      HISTORY:(criteria)    [] 35799 - no personal factors/history    [] 53667 - has 1-2 personal factor/comorbidity     [] 82163 - has >3 personal factor/comorbidity     Body Regions:  [] Objective examination findings  [] Head     []  Neck  [] Trunk   [] Upper Extremity  [] Lower Extremity    Body Systems:  [] For communication ability, affect, cognition, language, and learning style: the assessment of the ability to make needs known,  consciousness, orientation (person, place, and time), expected emotional /behavioral responses, and learning preferences (eg, learning barriers, education  needs)  [] For the neuromuscular system: a general assessment of gross coordinated movement (eg, balance, gait, locomotion, transfers, and transitions) and motor function  (motor control and motor learning)  [] For the musculoskeletal system: the assessment of gross symmetry, gross range of motion, gross strength, height, and weight  [] For the integumentary system: the assessment of pliability(texture), presence of scar formation, skin color, and skin integrity  [] For cardiovascular/pulmonary system: the assessment of heart rate, respiratory rate, blood pressure, and edema     Activity limitations:    [] Patient's cognitive status and saf ety concerns          [] Status of current condition      [] Weight bearing restriction  [] Cardiopulmunary Restriction    Participation Restrictions:   [] Goals and goal agreement with the patient     [] Rehab potential (prognosis) and probable outcome      Examination of Body System: (criteria)    [] 07950 - addressing 1-2 elements    [] 04278 - addressing a total of 3 or more elements     [] 03327 -  Addressing a total of 4 or more elements         Clinical Presentation: (criteria)  Choose one     On examination of body system using standardized tests and measures patient presents with (CHOOSE ONE) elements from any of the following: body structures and functions, activity limitations, and/or participation restrictions.  Leading to a clinical presentation that is considered (CHOOSE ONE)                              Clinical Decision Making  (Eval Complexity):  Choose One     Time Tracking:     PT Received On: 08/23/18  PT Start Time: 1035     PT Stop Time: 1051  PT Total Time (min): 16 min     Billable Minutes: Evaluation 8 and Gait Training 8      Cyndy Banda, PT  08/23/2018

## 2018-08-23 NOTE — ASSESSMENT & PLAN NOTE
Body mass index is 44.08 kg/m². Morbid obesity complicates all aspects of disease management from diagnostic modalities to treatment. Weight loss encouraged and health benefits explained to patient.

## 2018-08-23 NOTE — HPI
"Nelly Tian is a 68 y/o female with a PMHx of DM2, HTN, COPD, HLD, CHF, and chronic Afib who presented to the ED with reports of fever and tremors which began suddenly this afternoon.  She had a routine clinic appointment this morning which she states was a "wellness visit" with no symptoms at that time.  She has a history of a non-healing stage 3 pressure wound to the left abdominal wall which is being treated at [facility].  She was seen on 8/17/18 by her PCP for an ulcer to the RLE and was placed on doxycycline with which she reports compliance and states that the wound is improving.  Pt denies headache, chest pain, unusual SOB, diarrhea or dysuria.  She did have some nausea but no vomiting. While in the ED, Ms. Tian was found to meet sepsis criteria and is admitted to the service of hospital medicine for further treatment.  "

## 2018-08-23 NOTE — ASSESSMENT & PLAN NOTE
Pt with chronic non-healing stage 3 pressure ulcer presents meeting sepsis criteria.  Has been taking doxycycline since 8/17/18 for cellulitis RLE, which pt states has improved.    IV zosyn and IV vancomycin initiated in ED-continue  CT A/P in AM  NPO for now.

## 2018-08-23 NOTE — ASSESSMENT & PLAN NOTE
Potassium 5.3 upon admission.  Repeat level in am; treat as clinically indicated.  Monitor telemetry.

## 2018-08-23 NOTE — ASSESSMENT & PLAN NOTE
Chronic; appears controlled.  Will hold ACEI for now 2/2 sepsis.  Monitor and treat as clinically indicated.

## 2018-08-23 NOTE — SUBJECTIVE & OBJECTIVE
Past Medical History:   Diagnosis Date    *Atrial flutter     Angina pectoris 2017    Anxiety     Arthritis     Asthma     Atrial fibrillation     Back pain     Cataract     OD    CHF (congestive heart failure)     COPD (chronic obstructive pulmonary disease)     Depression     Diabetes mellitus     Emphysema of lung     Heart failure     Hepatomegaly 2/3/2016    Hernia     History of MI (myocardial infarction) 2016    Hypercapnic respiratory failure, chronic 2016    Hyperlipidemia     Hypertension     Iron deficiency anemia 2/3/2016    Myocardial infarction     Obesity     Peripheral vascular disease     Pneumonia     Polyneuropathy     Retinal detachment     OS    Septic shock 2017    Skin ulcer 3/18/2017    Tobacco dependence     Type II 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, not stated as uncontrolled(250.60)        Past Surgical History:   Procedure Laterality Date    BREAST BIOPSY Left 10 yrs ago    benign    CATARACT EXTRACTION Left     OS      SECTION      x2    EYE SURGERY      HYSTERECTOMY      partial    OOPHORECTOMY      one ovary conserved    RETINAL DETACHMENT SURGERY      buckle --OS    TONSILLECTOMY         Review of patient's allergies indicates:   Allergen Reactions    Dilaudid [hydromorphone] Anaphylaxis     Other reaction(s): Anaphylaxis  Other reaction(s): Unknown       No current facility-administered medications on file prior to encounter.      Current Outpatient Medications on File Prior to Encounter   Medication Sig    albuterol (ACCUNEB) 0.63 mg/3 mL Nebu INHALE THE CONTENTS OF 1 VIAL VIA NEBULIZER EVERY 6 HOURS AS NEEDED    albuterol (VENTOLIN HFA) 90 mcg/actuation inhaler Inhale 2 puffs into the lungs every 6 (six) hours as needed. Rescue    apixaban (ELIQUIS) 5 mg Tab Take 1 tablet (5 mg total) by mouth 2 (two) times daily.    ascorbic acid, vitamin C, (VITAMIN C) 500 MG tablet Take  1 tablet (500 mg total) by mouth every evening.    aspirin (ECOTRIN) 81 MG EC tablet Take 81 mg by mouth once daily.    atorvastatin (LIPITOR) 20 MG tablet TAKE 1 TABLET EVERY DAY    BREO ELLIPTA 100-25 mcg/dose diskus inhaler INHALE 1 PUFF INTO THE LUNGS ONE TIME DAILY (CONTROLLER)    budesonide (PULMICORT) 0.5 mg/2 mL nebulizer solution INHALE THE CONTENTS OF 1 VIAL VIA NEBULIZER EVERY DAY    clonazePAM (KLONOPIN) 1 MG tablet 1 tab po bid prn (Patient taking differently: Take 1 mg by mouth 2 (two) times daily as needed for Anxiety. 1 tab po bid prn)    doxycycline (VIBRAMYCIN) 100 MG Cap Take 1 capsule (100 mg total) by mouth every 12 (twelve) hours.    furosemide (LASIX) 40 MG tablet TAKE 1 TABLET ONE TIME DAILY  FOR  FLUID  OVERLOAD (Patient taking differently: TAKE 1 TABLET twice DAILY  FOR  FLUID  OVERLOAD)    gabapentin (NEURONTIN) 800 MG tablet Take 1 tablet (800 mg total) by mouth 3 (three) times daily.    HYDROcodone-acetaminophen (NORCO)  mg per tablet Take 1 tablet by mouth every 8 (eight) hours as needed for Pain (Do not take more than 3 a day. Do not mix with other narcotics.).    KLOR-CON M20 20 mEq tablet TAKE ONE TABLET BY MOUTH ONCE DAILY    levalbuterol (XOPENEX) 0.63 mg/3 mL nebulizer solution INHALE THE CONTENTS OF 1 VIAL BY NEBULIZATION 4 times  DAILY.    DX: J43.9    lisinopril (PRINIVIL,ZESTRIL) 20 MG tablet TAKE 1 TABLET EVERY DAY    metFORMIN (GLUCOPHAGE) 500 MG tablet Take 1 tablet (500 mg total) by mouth 2 (two) times daily with meals.    metoprolol succinate (TOPROL-XL) 25 MG 24 hr tablet TAKE 1/2 TABLET EVERY DAY    multivitamin (THERAGRAN) tablet Take 1 tablet by mouth once daily.    nystatin (MYCOSTATIN) powder Apply topically 4 (four) times daily as needed.    nystatin-triamcinolone (MYCOLOG II) cream APPLY SPARINGLY TO AFFECTED AREA(S) 3 TIMES DAILY AS NEEDED    omeprazole (PRILOSEC) 20 MG capsule Take 1 capsule (20 mg total) by mouth once daily.     polyethylene glycol (GLYCOLAX) 17 gram/dose powder Take 17 g by mouth nightly as needed.    spironolactone (ALDACTONE) 25 MG tablet Take 1 tablet (25 mg total) by mouth once daily.    meloxicam (MOBIC) 7.5 MG tablet TAKE 1 TABLET EVERY DAY    silver sulfADIAZINE 1% (SILVADENE) 1 % cream Apply topically once daily.    traZODone (DESYREL) 150 MG tablet TAKE 1 TABLET EVERY EVENING.         Family History     Problem Relation (Age of Onset)    Alcohol abuse Mother    Arthritis Father, Sister    Blindness Son    Cancer Brother    Crohn's disease Sister    Early death Sister (30)    Heart disease Father, Sister (32), Sister    No Known Problems Daughter        Tobacco Use    Smoking status: Current Some Day Smoker     Packs/day: 0.25     Years: 50.00     Pack years: 12.50     Types: Cigarettes     Start date: 1/19/1968    Smokeless tobacco: Never Used   Substance and Sexual Activity    Alcohol use: No     Alcohol/week: 0.0 oz     Frequency: Never    Drug use: No    Sexual activity: Not Currently     Review of Systems   Constitutional: Positive for chills and fever.   HENT: Positive for rhinorrhea. Negative for sore throat and trouble swallowing.    Eyes: Negative for photophobia and visual disturbance.   Respiratory: Negative for cough, shortness of breath and wheezing.    Cardiovascular: Negative for chest pain and palpitations.   Gastrointestinal: Positive for abdominal pain and nausea. Negative for diarrhea and vomiting.   Endocrine: Negative for polyphagia and polyuria.   Genitourinary: Negative for dysuria and frequency.   Musculoskeletal: Negative for back pain and neck pain.   Skin: Positive for color change and wound.   Neurological: Positive for weakness. Negative for light-headedness.   Hematological: Negative for adenopathy. Bruises/bleeds easily.   Psychiatric/Behavioral: Negative for agitation and confusion.   All other systems reviewed and are negative.    Objective:     Vital Signs (Most  Recent):  Temp: (!) 101.3 °F (38.5 °C) (08/22/18 2315)  Pulse: 95 (08/22/18 2315)  Resp: 20 (08/22/18 2315)  BP: (!) 110/51 (08/22/18 2315)  SpO2: (!) 93 % (08/22/18 2315) Vital Signs (24h Range):  Temp:  [97.7 °F (36.5 °C)-103.1 °F (39.5 °C)] 101.3 °F (38.5 °C)  Pulse:  [] 95  Resp:  [18-20] 20  SpO2:  [89 %-95 %] 93 %  BP: ()/(43-83) 110/51     Weight: 131.5 kg (289 lb 14.5 oz)  Body mass index is 44.08 kg/m².    Physical Exam   Constitutional: She is oriented to person, place, and time. She appears well-developed and well-nourished.   Morbidly obese   HENT:   Head: Normocephalic and atraumatic.   Mouth/Throat: Oropharynx is clear and moist.   Eyes: Conjunctivae and EOM are normal. Pupils are equal, round, and reactive to light.   Neck: Normal range of motion. Neck supple. No JVD present. No tracheal deviation present.   Cardiovascular: Normal heart sounds. An irregularly irregular rhythm present. Tachycardia present.   Pulmonary/Chest: Effort normal and breath sounds normal.   Abdominal: Soft. Bowel sounds are normal. There is tenderness.   Genitourinary:   Genitourinary Comments: deferred   Musculoskeletal: Normal range of motion. She exhibits no edema.   Neurological: She is alert and oriented to person, place, and time.   Skin: Skin is warm and dry. There is erythema.   Left lateral abdominal pannus that is cover with a large bandage. The wound is open and draining. Scab on the right distal leg measuring 1 cm x 2 cm. Erythema of RLE.    Nursing note and vitals reviewed.        CRANIAL NERVES     CN III, IV, VI   Pupils are equal, round, and reactive to light.  Extraocular motions are normal.                Significant Labs:   CBC:   Recent Labs   Lab  08/22/18   1704   WBC  16.20*   HGB  12.5   HCT  39.5   PLT  271     CMP:   Recent Labs   Lab  08/22/18   1704   NA  139   K  5.3*   CL  100   CO2  29   GLU  197*   BUN  12   CREATININE  0.8   CALCIUM  9.8   PROT  7.6   ALBUMIN  3.5   BILITOT  0.4    ALKPHOS  89   AST  18   ALT  16   ANIONGAP  10   EGFRNONAA  >60     Lactic Acid:   Recent Labs   Lab  08/22/18   1704  08/22/18   2026   LACTATE  1.7  1.8     Urine Studies:   Recent Labs   Lab  08/22/18   1751   COLORU  Yellow   APPEARANCEUA  Clear   PHUR  6.0   SPECGRAV  1.015   PROTEINUA  Negative   GLUCUA  Negative   KETONESU  Negative   BILIRUBINUA  Negative   OCCULTUA  Trace*   NITRITE  Negative   UROBILINOGEN  Negative   LEUKOCYTESUR  Negative       Significant Imaging: CXR: I have reviewed all pertinent results/findings within the past 24 hours and my personal findings are:  No acute process.

## 2018-08-23 NOTE — PLAN OF CARE
Problem: Physical Therapy Goal  Goal: Physical Therapy Goal  Goals to be met by: 2018     Patient will increase functional independence with mobility by performin. Sit to stand transfer with Supervision  2. Gait  x 250 feet with Contact Guard Assistance using Rolling Walker.   3. Lower extremity exercise program x20 reps per handout, with assistance as needed    Outcome: Ongoing (interventions implemented as appropriate)  PT eval and treat completed. Pt ambulated 40ft with HHA. EOB sitting- awaiting CT scan

## 2018-08-24 LAB
ALBUMIN SERPL BCP-MCNC: 2.4 G/DL
ALP SERPL-CCNC: 59 U/L
ALT SERPL W/O P-5'-P-CCNC: 17 U/L
ANION GAP SERPL CALC-SCNC: 7 MMOL/L
AST SERPL-CCNC: 23 U/L
BASOPHILS # BLD AUTO: 0 K/UL
BASOPHILS NFR BLD: 0.5 %
BILIRUB SERPL-MCNC: 0.3 MG/DL
BUN SERPL-MCNC: 11 MG/DL
CALCIUM SERPL-MCNC: 8.7 MG/DL
CHLORIDE SERPL-SCNC: 103 MMOL/L
CO2 SERPL-SCNC: 29 MMOL/L
CREAT SERPL-MCNC: 0.6 MG/DL
DIFFERENTIAL METHOD: ABNORMAL
EOSINOPHIL # BLD AUTO: 0.1 K/UL
EOSINOPHIL NFR BLD: 1.4 %
ERYTHROCYTE [DISTWIDTH] IN BLOOD BY AUTOMATED COUNT: 16.9 %
EST. GFR  (AFRICAN AMERICAN): >60 ML/MIN/1.73 M^2
EST. GFR  (NON AFRICAN AMERICAN): >60 ML/MIN/1.73 M^2
GLUCOSE SERPL-MCNC: 103 MG/DL
HCT VFR BLD AUTO: 29.8 %
HGB BLD-MCNC: 9.5 G/DL
LYMPHOCYTES # BLD AUTO: 1.5 K/UL
LYMPHOCYTES NFR BLD: 16.9 %
MAGNESIUM SERPL-MCNC: 1.8 MG/DL
MCH RBC QN AUTO: 28.9 PG
MCHC RBC AUTO-ENTMCNC: 31.9 G/DL
MCV RBC AUTO: 91 FL
MONOCYTES # BLD AUTO: 1.1 K/UL
MONOCYTES NFR BLD: 12.6 %
NEUTROPHILS # BLD AUTO: 6.1 K/UL
NEUTROPHILS NFR BLD: 68.6 %
PHOSPHATE SERPL-MCNC: 3 MG/DL
PLATELET # BLD AUTO: 200 K/UL
PMV BLD AUTO: 9.1 FL
POCT GLUCOSE: 105 MG/DL (ref 70–110)
POCT GLUCOSE: 118 MG/DL (ref 70–110)
POCT GLUCOSE: 142 MG/DL (ref 70–110)
POCT GLUCOSE: 97 MG/DL (ref 70–110)
POTASSIUM SERPL-SCNC: 3.8 MMOL/L
PROT SERPL-MCNC: 5.4 G/DL
RBC # BLD AUTO: 3.29 M/UL
SODIUM SERPL-SCNC: 139 MMOL/L
WBC # BLD AUTO: 8.8 K/UL

## 2018-08-24 PROCEDURE — 87077 CULTURE AEROBIC IDENTIFY: CPT

## 2018-08-24 PROCEDURE — 25000003 PHARM REV CODE 250: Performed by: NURSE PRACTITIONER

## 2018-08-24 PROCEDURE — 80053 COMPREHEN METABOLIC PANEL: CPT

## 2018-08-24 PROCEDURE — 94761 N-INVAS EAR/PLS OXIMETRY MLT: CPT

## 2018-08-24 PROCEDURE — 94640 AIRWAY INHALATION TREATMENT: CPT

## 2018-08-24 PROCEDURE — 87070 CULTURE OTHR SPECIMN AEROBIC: CPT

## 2018-08-24 PROCEDURE — 63600175 PHARM REV CODE 636 W HCPCS: Performed by: HOSPITALIST

## 2018-08-24 PROCEDURE — 63600175 PHARM REV CODE 636 W HCPCS: Performed by: EMERGENCY MEDICINE

## 2018-08-24 PROCEDURE — 36415 COLL VENOUS BLD VENIPUNCTURE: CPT

## 2018-08-24 PROCEDURE — 87186 SC STD MICRODIL/AGAR DIL: CPT

## 2018-08-24 PROCEDURE — 11000001 HC ACUTE MED/SURG PRIVATE ROOM

## 2018-08-24 PROCEDURE — 27000221 HC OXYGEN, UP TO 24 HOURS

## 2018-08-24 PROCEDURE — 85025 COMPLETE CBC W/AUTO DIFF WBC: CPT

## 2018-08-24 PROCEDURE — 97116 GAIT TRAINING THERAPY: CPT

## 2018-08-24 PROCEDURE — 25000003 PHARM REV CODE 250: Performed by: EMERGENCY MEDICINE

## 2018-08-24 PROCEDURE — 84100 ASSAY OF PHOSPHORUS: CPT

## 2018-08-24 PROCEDURE — 25000003 PHARM REV CODE 250: Performed by: HOSPITALIST

## 2018-08-24 PROCEDURE — 83735 ASSAY OF MAGNESIUM: CPT

## 2018-08-24 PROCEDURE — 25000242 PHARM REV CODE 250 ALT 637 W/ HCPCS: Performed by: HOSPITALIST

## 2018-08-24 RX ORDER — SPIRONOLACTONE 25 MG/1
25 TABLET ORAL DAILY
Status: DISCONTINUED | OUTPATIENT
Start: 2018-08-25 | End: 2018-08-28 | Stop reason: HOSPADM

## 2018-08-24 RX ORDER — LISINOPRIL 10 MG/1
20 TABLET ORAL DAILY
Status: DISCONTINUED | OUTPATIENT
Start: 2018-08-25 | End: 2018-08-28 | Stop reason: HOSPADM

## 2018-08-24 RX ORDER — FUROSEMIDE 40 MG/1
40 TABLET ORAL 2 TIMES DAILY
Status: DISCONTINUED | OUTPATIENT
Start: 2018-08-24 | End: 2018-08-28 | Stop reason: HOSPADM

## 2018-08-24 RX ADMIN — OXYCODONE HYDROCHLORIDE AND ACETAMINOPHEN 500 MG: 500 TABLET ORAL at 09:08

## 2018-08-24 RX ADMIN — ATORVASTATIN CALCIUM 20 MG: 20 TABLET, FILM COATED ORAL at 08:08

## 2018-08-24 RX ADMIN — BUDESONIDE 0.5 MG: 0.5 INHALANT RESPIRATORY (INHALATION) at 07:08

## 2018-08-24 RX ADMIN — PIPERACILLIN SODIUM AND TAZOBACTAM SODIUM 4.5 G: 4; .5 INJECTION, POWDER, FOR SOLUTION INTRAVENOUS at 05:08

## 2018-08-24 RX ADMIN — MAGNESIUM HYDROXIDE 4800 MG: 400 SUSPENSION ORAL at 08:08

## 2018-08-24 RX ADMIN — GABAPENTIN 800 MG: 400 CAPSULE ORAL at 09:08

## 2018-08-24 RX ADMIN — APIXABAN 5 MG: 2.5 TABLET, FILM COATED ORAL at 08:08

## 2018-08-24 RX ADMIN — GABAPENTIN 800 MG: 400 CAPSULE ORAL at 03:08

## 2018-08-24 RX ADMIN — FLUTICASONE FUROATE AND VILANTEROL TRIFENATATE 1 PUFF: 100; 25 POWDER RESPIRATORY (INHALATION) at 07:08

## 2018-08-24 RX ADMIN — ACETAMINOPHEN 650 MG: 325 TABLET, FILM COATED ORAL at 09:08

## 2018-08-24 RX ADMIN — LEVALBUTEROL HYDROCHLORIDE 0.63 MG: 0.63 SOLUTION RESPIRATORY (INHALATION) at 07:08

## 2018-08-24 RX ADMIN — APIXABAN 5 MG: 2.5 TABLET, FILM COATED ORAL at 09:08

## 2018-08-24 RX ADMIN — GABAPENTIN 800 MG: 400 CAPSULE ORAL at 08:08

## 2018-08-24 RX ADMIN — PANTOPRAZOLE SODIUM 40 MG: 40 TABLET, DELAYED RELEASE ORAL at 08:08

## 2018-08-24 RX ADMIN — VANCOMYCIN HYDROCHLORIDE 1500 MG: 1 INJECTION, POWDER, LYOPHILIZED, FOR SOLUTION INTRAVENOUS at 09:08

## 2018-08-24 RX ADMIN — VANCOMYCIN HYDROCHLORIDE 1500 MG: 1 INJECTION, POWDER, LYOPHILIZED, FOR SOLUTION INTRAVENOUS at 08:08

## 2018-08-24 RX ADMIN — PIPERACILLIN SODIUM AND TAZOBACTAM SODIUM 4.5 G: 4; .5 INJECTION, POWDER, FOR SOLUTION INTRAVENOUS at 04:08

## 2018-08-24 RX ADMIN — TRAZODONE HYDROCHLORIDE 75 MG: 50 TABLET ORAL at 09:08

## 2018-08-24 RX ADMIN — THERA TABS 1 TABLET: TAB at 08:08

## 2018-08-24 RX ADMIN — LEVALBUTEROL HYDROCHLORIDE 0.63 MG: 0.63 SOLUTION RESPIRATORY (INHALATION) at 03:08

## 2018-08-24 RX ADMIN — LEVALBUTEROL HYDROCHLORIDE 0.63 MG: 0.63 SOLUTION RESPIRATORY (INHALATION) at 12:08

## 2018-08-24 RX ADMIN — METOPROLOL SUCCINATE 12.5 MG: 25 TABLET, EXTENDED RELEASE ORAL at 08:08

## 2018-08-24 RX ADMIN — IBUPROFEN 800 MG: 400 TABLET, FILM COATED ORAL at 03:08

## 2018-08-24 RX ADMIN — CLONAZEPAM 1 MG: 1 TABLET ORAL at 09:08

## 2018-08-24 NOTE — ASSESSMENT & PLAN NOTE
Chronic; appears controlled.  Will hold ACEI for now 2/2 sepsis.  Cont BB.  Monitor and treat as clinically indicated.

## 2018-08-24 NOTE — PLAN OF CARE
Humphrey Rosas RN- completed the assessment:  Pt arrived from home, she is independent in care and lives with daughter.  PCP is Dr. Bird.  Insurance verified as [employer] and Medicaid.  Pt is on Oxygen @ 2L per n/c from [facility].   Pt uses [facility].  Pt is on Eliquis, denies diabetes, dialysis and coumadin.  Pt has a living will.  Disposition:  Pt will discharge to home with family.  No needs verbalized at this time.  Pt may possibly benefit with HH or resume Out Pt Wound Care.       08/23/18 1500   Discharge Assessment   Assessment Type Discharge Planning Assessment   Confirmed/corrected address and phone number on facesheet? Yes   Assessment information obtained from? Patient   Prior to hospitilization cognitive status: Alert/Oriented   Prior to hospitalization functional status: Independent;Assistive Equipment   Current cognitive status: Alert/Oriented   Current Functional Status: Independent;Assistive Equipment   Facility Arrived From: home   Lives With child(bonny), adult   Able to Return to Prior Arrangements yes   Is patient able to care for self after discharge? Yes   Who are your caregiver(s) and their phone number(s)? lulu Tirado - [phone]   Patient's perception of discharge disposition home or selfcare   Readmission Within The Last 30 Days no previous admission in last 30 days   Patient currently being followed by outpatient case management? Yes   If yes, name of outpatient case management following: insurance company assigned oupatient case management   Patient currently receives any other outside agency services? No   Equipment Currently Used at Home cane, straight;rollator   Do you have any problems affording any of your prescribed medications? No   Is the patient taking medications as prescribed? yes  ([facility])   Does the patient have transportation home? Yes   Transportation Available family or friend will provide   Dialysis Name and Scheduled days na   Does the  patient receive services at the Coumadin Clinic? No  (Pt is on Eliquis)   Discharge Plan A Home   Discharge Plan B Home Health;Home with family   Patient/Family In Agreement With Plan yes

## 2018-08-24 NOTE — PLAN OF CARE
Problem: Physical Therapy Goal  Goal: Physical Therapy Goal  Goals to be met by: 2018     Patient will increase functional independence with mobility by performin. Sit to stand transfer with Supervision  2. Gait  x 250 feet with Contact Guard Assistance using Rolling Walker.   3. Lower extremity exercise program x20 reps per handout, with assistance as needed     Outcome: Ongoing (interventions implemented as appropriate)  Ambulated in hallway with A for safety.

## 2018-08-24 NOTE — PLAN OF CARE
Problem: Patient Care Overview  Goal: Plan of Care Review  Outcome: Ongoing (interventions implemented as appropriate)  Pt AAO x4, midline in place, clean dry and intact, IVF infusing throughout shift.   IV abx administered as ordered.  PRN pain medication, adequate relief achieved.  PRN antipyretic administered for fever, afebrile throughout remainder of shift.  PRN O2 by NC, POCT ac/hs assessed, no SSI coverage needed.  I/O monitored and documented.  VS stable.  Hourly rounding completed.  Pt has remained free of injuries and falls throughout shift.  Environment free of clutter, side rails up x2, and call light within reach.  Pt has verbalized understanding of plan of care.

## 2018-08-24 NOTE — SUBJECTIVE & OBJECTIVE
Interval History:  Febrile.  Redness is decreasing.  Stable.  Seen by [facility].    Review of Systems   Constitutional: Positive for fever. Negative for chills.   Respiratory: Negative for shortness of breath.    Cardiovascular: Negative for chest pain.   Gastrointestinal: Negative for nausea and vomiting.     Objective:     Vital Signs (Most Recent):  Temp: (!) 101.3 °F (38.5 °C) (08/23/18 2034)  Pulse: 60 (08/23/18 1949)  Resp: 20 (08/23/18 1949)  BP: (!) 100/46 (08/23/18 1930)  SpO2: 98 % (08/23/18 1949) Vital Signs (24h Range):  Temp:  [97.2 °F (36.2 °C)-103.1 °F (39.5 °C)] 101.3 °F (38.5 °C)  Pulse:  [60-97] 60  Resp:  [16-20] 20  SpO2:  [90 %-98 %] 98 %  BP: ()/(43-65) 100/46     Weight: 131.5 kg (289 lb 14.5 oz)  Body mass index is 44.08 kg/m².    Intake/Output Summary (Last 24 hours) at 8/23/2018 2042  Last data filed at 8/23/2018 1757  Gross per 24 hour   Intake 3641.67 ml   Output --   Net 3641.67 ml      Physical Exam   Constitutional: She is oriented to person, place, and time. No distress.   Morbidly obese   HENT:   Mouth/Throat: Oropharynx is clear and moist.   Eyes: Conjunctivae are normal.   Neck: Neck supple. No JVD present.   Cardiovascular: Normal heart sounds. An irregularly irregular rhythm present. Tachycardia present.   Pulmonary/Chest: Effort normal and breath sounds normal.   Abdominal: Soft. Bowel sounds are normal. There is tenderness.   Musculoskeletal: Normal range of motion. She exhibits no edema.   Neurological: She is alert and oriented to person, place, and time.   Skin: Skin is warm and dry. She is not diaphoretic. There is erythema.   Left lateral abdominal pannus.  There are stage 3 ulcers with surrounding cellulitis.  Ulcers are dressed with gauze.   Nursing note and vitals reviewed.      Significant Labs: All pertinent labs within the past 24 hours have been reviewed.    Significant Imaging: I have reviewed all pertinent imaging results/findings within the past 24 hours.

## 2018-08-24 NOTE — PLAN OF CARE
08/23/18 1949   Patient Assessment/Suction   Level of Consciousness (AVPU) alert   Respiratory Effort Unlabored   All Lung Fields Breath Sounds diminished   PONCHO Breath Sounds diminished   LLL Breath Sounds diminished   RUL Breath Sounds diminished   RML Breath Sounds diminished   RLL Breath Sounds diminished   PRE-TX-O2-ETCO2   O2 Device (Oxygen Therapy) nasal cannula   $ Is the patient on Low Flow Oxygen? Yes   Flow (L/min) 3   SpO2 98 %   Pulse Oximetry Type Intermittent   $ Pulse Oximetry - Multiple Charge Pulse Oximetry - Multiple   Pulse 60   Resp 20   Aerosol Therapy   $ Aerosol Therapy Charges Aerosol Treatment   Respiratory Treatment Status given   SVN/Inhaler Treatment Route with oxygen;mask   Position During Treatment HOB at 45 degrees   Patient Tolerance good   Post-Treatment   Post-treatment Heart Rate (beats/min) 67   Post-treatment Resp Rate (breaths/min) 19   All Fields Breath Sounds unchanged

## 2018-08-24 NOTE — ASSESSMENT & PLAN NOTE
Stage 3 located on abdominal wall.  Will continue wound care as she has gotten at [facility].  See orders.

## 2018-08-24 NOTE — PLAN OF CARE
08/24/18 0715   Patient Assessment/Suction   Level of Consciousness (AVPU) alert   Respiratory Effort Normal;Unlabored   Expansion/Accessory Muscles/Retractions no use of accessory muscles;no retractions;expansion symmetric   All Lung Fields Breath Sounds diminished   Rhythm/Pattern, Respiratory depth regular;pattern regular;unlabored   Cough Frequency infrequent   Cough Type good;nonproductive   PRE-TX-O2-ETCO2   O2 Device (Oxygen Therapy) nasal cannula  (pt placed back on NC, RA sat 84%)   $ Is the patient on Low Flow Oxygen? Yes   Flow (L/min) 3   SpO2 95 %   Pulse Oximetry Type Intermittent   $ Pulse Oximetry - Multiple Charge Pulse Oximetry - Multiple   Pulse 62   Resp 18   Aerosol Therapy   $ Aerosol Therapy Charges Aerosol Treatment   Respiratory Treatment Status given   SVN/Inhaler Treatment Route mask   Position During Treatment HOB at 30 degrees   Patient Tolerance good   Post-Treatment   Post-treatment Heart Rate (beats/min) 62   Post-treatment Resp Rate (breaths/min) 18   All Fields Breath Sounds unchanged       Aerosol treatments q8 with Xopenex and q12 with Pulmicort. Breo q day. Patient tolerated well.

## 2018-08-24 NOTE — CONSULTS
Date: 8/23/2018   Nelly Tian [id number] is a 67 y.o. female who has been consulted for vancomycin dosing.    The patient has the following labs:     Creatinine (mg/dl)    WBC Count   Serum creatinine: 0.7 mg/dL 08/23/18 0628  Estimated creatinine clearance: 113.4 mL/min Lab Results   Component Value Date    WBC 14.90 (H) 08/23/2018    WBC 14.90 (H) 08/23/2018        Current weight is 131.5 kg (289 lb 14.5 oz)      Pt is receiving vancomycin 2000 mg every 12 hours.  Vancomycin trough before 3rd dose from 8/23/2018 at 1743 was 15.7 mg/dL.  Target goal is 10-15 mg/dL.    Trough was drawn on time and taken before steady state and anticipate it is  Supratherapeutic. Pharmacy will decrease current dose.   The patient will be changed to a vancomycin dose of 1500 mg every 12 hours. The vancomycin trough has been ordered for 8/25/18 at 0730.  Patient will be followed by pharmacy for changes in renal function, toxicity, and efficacy.    Thank you for allowing us to participate in this patient's care.     Rodríguez Vaughan, Pharmacist

## 2018-08-24 NOTE — PT/OT/SLP PROGRESS
Physical Therapy Treatment    Patient Name:  Nelly Tian   MRN:  [id number]    Recommendations:     Discharge Recommendations:  home   Discharge Equipment Recommendations:     Barriers to discharge: None    Assessment:     Nelly Tian is a 67 y.o. female admitted with a medical diagnosis of Cellulitis of abdominal wall.  She presents with the following impairments/functional limitations:  weakness, impaired endurance, impaired functional mobilty, impaired balance, pain, edema .    Rehab Prognosis:  fair; patient would benefit from acute skilled PT services to address these deficits and reach maximum level of function.      Recent Surgery: * No surgery found *      Plan:     During this hospitalization, patient to be seen 6 x/week to address the above listed problems via gait training, therapeutic activities, therapeutic exercises  · Plan of Care Expires:  09/07/18   Plan of Care Reviewed with: patient    Subjective     Communicated with nurse Jurado prior to session.  Patient found seated EOB upon PT entry to room, agreeable to treatment.      Chief Complaint: frequent BM's.  Patient comments/goals: to return home once feeling better.  Pain/Comfort:  · Pain Rating 1: 6/10  · Location - Side 1: Left  · Location 1: abdomen  · Pain Addressed 1: Reposition    Patients cultural, spiritual, Nondenominational conflicts given the current situation:      Objective:     Patient found with: peripheral IV, oxygen     General Precautions: Standard, fall   Orthopedic Precautions:N/A   Braces: N/A     Functional Mobility:  · Transfers:     · Sit to Stand:  contact guard assistance with hand-held assist  · Gait: 120' with HHA pushing IV pole      AM-PAC 6 CLICK MOBILITY          Therapeutic Activities and Exercises:   Ambulated in hallway with A for safety. Distance limited due to laxative has patient going frequently.    Patient left seated EOB with all lines intact, call button in reach and nurse Jurado notified..    GOALS:    Multidisciplinary Problems     Physical Therapy Goals        Problem: Physical Therapy Goal    Goal Priority Disciplines Outcome Goal Variances Interventions   Physical Therapy Goal     PT, PT/OT Ongoing (interventions implemented as appropriate)     Description:  Goals to be met by: 2018     Patient will increase functional independence with mobility by performin. Sit to stand transfer with Supervision  2. Gait  x 250 feet with Contact Guard Assistance using Rolling Walker.   3. Lower extremity exercise program x20 reps per handout, with assistance as needed                      Time Tracking:     PT Received On: 18  PT Start Time: 954     PT Stop Time:   PT Total Time (min): 8 min     Billable Minutes: Gait Training 8min    Treatment Type: Treatment  PT/PTA: PTA     PTA Visit Number: 1     Prema Sher PTA  2018

## 2018-08-24 NOTE — ASSESSMENT & PLAN NOTE
Chronic; controlled.  Last A1c 6.9 on 6/13/18  Holding metformin  Accucheck ac/hs with SSI coverage as needed    Lab Results   Component Value Date    HGBA1C 5.6 08/23/2018

## 2018-08-24 NOTE — PROGRESS NOTES
"[facility] Hospital Medicine  Progress Note    Patient Name: Nelly Tian  MRN: [id number]  Patient Class: IP- Inpatient   Admission Date: 8/22/2018  Length of Stay: 1 days  Attending Physician: Diaz Pa MD  Primary Care Provider: Constantine Bird MD        Subjective:     Principal Problem:Cellulitis of abdominal wall    HPI:  Nelly Tian is a 68 y/o female with a PMHx of DM2, HTN, COPD, HLD, CHF, and chronic Afib who presented to the ED with reports of fever and tremors which began suddenly this afternoon.  She had a routine clinic appointment this morning which she states was a "wellness visit" with no symptoms at that time.  She has a history of a non-healing stage 3 pressure wound to the left abdominal wall which is being treated at [facility].  She was seen on 8/17/18 by her PCP for an ulcer to the RLE and was placed on doxycycline with which she reports compliance and states that the wound is improving.  Pt denies headache, chest pain, unusual SOB, diarrhea or dysuria.  She did have some nausea but no vomiting. While in the ED, Ms. Tian was found to meet sepsis criteria and is admitted to the service of hospital medicine for further treatment.    Hospital Course:  No notes on file    Interval History:  Febrile.  Redness is decreasing.  Stable.  Seen by [facility].    Review of Systems   Constitutional: Positive for fever. Negative for chills.   Respiratory: Negative for shortness of breath.    Cardiovascular: Negative for chest pain.   Gastrointestinal: Negative for nausea and vomiting.     Objective:     Vital Signs (Most Recent):  Temp: (!) 101.3 °F (38.5 °C) (08/23/18 2034)  Pulse: 60 (08/23/18 1949)  Resp: 20 (08/23/18 1949)  BP: (!) 100/46 (08/23/18 1930)  SpO2: 98 % (08/23/18 1949) Vital Signs (24h Range):  Temp:  [97.2 °F (36.2 °C)-103.1 °F (39.5 °C)] 101.3 °F (38.5 °C)  Pulse:  [60-97] 60  Resp:  [16-20] 20  SpO2:  [90 %-98 %] 98 %  BP: ()/(43-65) 100/46     Weight: 131.5 " kg (289 lb 14.5 oz)  Body mass index is 44.08 kg/m².    Intake/Output Summary (Last 24 hours) at 8/23/2018 2042  Last data filed at 8/23/2018 1757  Gross per 24 hour   Intake 3641.67 ml   Output --   Net 3641.67 ml      Physical Exam   Constitutional: She is oriented to person, place, and time. No distress.   Morbidly obese   HENT:   Mouth/Throat: Oropharynx is clear and moist.   Eyes: Conjunctivae are normal.   Neck: Neck supple. No JVD present.   Cardiovascular: Normal heart sounds. An irregularly irregular rhythm present. Tachycardia present.   Pulmonary/Chest: Effort normal and breath sounds normal.   Abdominal: Soft. Bowel sounds are normal. There is tenderness.   Musculoskeletal: Normal range of motion. She exhibits no edema.   Neurological: She is alert and oriented to person, place, and time.   Skin: Skin is warm and dry. She is not diaphoretic. There is erythema.   Left lateral abdominal pannus.  There are stage 3 ulcers with surrounding cellulitis.  Ulcers are dressed with gauze.   Nursing note and vitals reviewed.      Significant Labs: All pertinent labs within the past 24 hours have been reviewed.    Significant Imaging: I have reviewed all pertinent imaging results/findings within the past 24 hours.    Assessment/Plan:      * Cellulitis of abdominal wall    Pt with chronic non-healing stage 3 pressure ulcer on pannus.   IV zosyn and IV vancomycin initiated in ED-continue  CTAP neg for abscess in adipose tissue.              Decubitus ulcer    Stage 3 located on abdominal wall.  Will continue wound care as she has gotten at [facility].  See orders.          Chronic combined systolic and diastolic CHF (congestive heart failure)    Monitor telemetry  Hold antidiuretics for now 2/2 sepsis  Monitor for s/s of decompensation  Last TTE 2/1/18 with EF 50%        COPD (chronic obstructive pulmonary disease)    Chronic; controlled.  Continue home medications.  Supplemental O2 as needed.          DDD (degenerative disc  disease), lumbar    Chronic; continue chronic pain regimen.          Hypertension associated with diabetes    Chronic; appears controlled.  Will hold ACEI for now 2/2 sepsis.  Cont BB.  Monitor and treat as clinically indicated.            Morbid obesity with BMI of 40.0-44.9, adult    Body mass index is 44.08 kg/m². Morbid obesity complicates all aspects of disease management from diagnostic modalities to treatment. Weight loss encouraged and health benefits explained to patient.              Chronic atrial fibrillation    Monitor telemetry  Continue beta blocker for rate control as bp tolerates  Continue eliquis          Tobacco dependency    Dangers of cigarette smoking were reviewed with patient in detail for 3 minutes and patient was encouraged to quit. Nicotine replacement options were discussed.  Pt declines nicotine patch.            GERD (gastroesophageal reflux disease)    Chronic; continue PPI          Long term current use of anticoagulant therapy    On eliquis 2/2 chronic atrial fibrillation.  Continue current therapy.          Diabetes mellitus with neuropathy    Chronic; controlled.  Last A1c 6.9 on 6/13/18  Holding metformin  Accucheck ac/hs with SSI coverage as needed    Lab Results   Component Value Date    HGBA1C 5.6 08/23/2018               VTE Risk Mitigation (From admission, onward)        Ordered     apixaban tablet 5 mg  2 times daily      08/23/18 0237     IP VTE HIGH RISK PATIENT  Once      08/23/18 0237     Reason for No Pharmacological VTE Prophylaxis  Once      08/23/18 0237              Diaz Pa MD  Department of Hospital Medicine   [facility]

## 2018-08-24 NOTE — ASSESSMENT & PLAN NOTE
Pt with chronic non-healing stage 3 pressure ulcer on pannus.   IV zosyn and IV vancomycin initiated in ED-continue  CTAP neg for abscess in adipose tissue.

## 2018-08-25 LAB
BACTERIA UR CULT: NORMAL
POCT GLUCOSE: 109 MG/DL (ref 70–110)
POCT GLUCOSE: 135 MG/DL (ref 70–110)
POCT GLUCOSE: 150 MG/DL (ref 70–110)
POCT GLUCOSE: 158 MG/DL (ref 70–110)
VANCOMYCIN TROUGH SERPL-MCNC: 14.3 UG/ML

## 2018-08-25 PROCEDURE — 25000003 PHARM REV CODE 250: Performed by: EMERGENCY MEDICINE

## 2018-08-25 PROCEDURE — 63600175 PHARM REV CODE 636 W HCPCS: Performed by: EMERGENCY MEDICINE

## 2018-08-25 PROCEDURE — 63600175 PHARM REV CODE 636 W HCPCS: Performed by: HOSPITALIST

## 2018-08-25 PROCEDURE — 25000003 PHARM REV CODE 250: Performed by: HOSPITALIST

## 2018-08-25 PROCEDURE — 97116 GAIT TRAINING THERAPY: CPT

## 2018-08-25 PROCEDURE — 94640 AIRWAY INHALATION TREATMENT: CPT

## 2018-08-25 PROCEDURE — 36415 COLL VENOUS BLD VENIPUNCTURE: CPT

## 2018-08-25 PROCEDURE — 80202 ASSAY OF VANCOMYCIN: CPT

## 2018-08-25 PROCEDURE — 94761 N-INVAS EAR/PLS OXIMETRY MLT: CPT

## 2018-08-25 PROCEDURE — 11000001 HC ACUTE MED/SURG PRIVATE ROOM

## 2018-08-25 PROCEDURE — 25000003 PHARM REV CODE 250: Performed by: NURSE PRACTITIONER

## 2018-08-25 PROCEDURE — 27000221 HC OXYGEN, UP TO 24 HOURS

## 2018-08-25 PROCEDURE — 25000242 PHARM REV CODE 250 ALT 637 W/ HCPCS: Performed by: HOSPITALIST

## 2018-08-25 RX ADMIN — CLONAZEPAM 1 MG: 1 TABLET ORAL at 09:08

## 2018-08-25 RX ADMIN — ACETAMINOPHEN 650 MG: 325 TABLET, FILM COATED ORAL at 03:08

## 2018-08-25 RX ADMIN — ACETAMINOPHEN 650 MG: 325 TABLET, FILM COATED ORAL at 08:08

## 2018-08-25 RX ADMIN — OXYCODONE HYDROCHLORIDE AND ACETAMINOPHEN 500 MG: 500 TABLET ORAL at 08:08

## 2018-08-25 RX ADMIN — PIPERACILLIN SODIUM AND TAZOBACTAM SODIUM 4.5 G: 4; .5 INJECTION, POWDER, FOR SOLUTION INTRAVENOUS at 11:08

## 2018-08-25 RX ADMIN — APIXABAN 5 MG: 2.5 TABLET, FILM COATED ORAL at 08:08

## 2018-08-25 RX ADMIN — LEVALBUTEROL HYDROCHLORIDE 0.63 MG: 0.63 SOLUTION RESPIRATORY (INHALATION) at 03:08

## 2018-08-25 RX ADMIN — GABAPENTIN 800 MG: 400 CAPSULE ORAL at 08:08

## 2018-08-25 RX ADMIN — BUDESONIDE 0.5 MG: 0.5 INHALANT RESPIRATORY (INHALATION) at 08:08

## 2018-08-25 RX ADMIN — LEVALBUTEROL HYDROCHLORIDE 0.63 MG: 0.63 SOLUTION RESPIRATORY (INHALATION) at 08:08

## 2018-08-25 RX ADMIN — LISINOPRIL 20 MG: 10 TABLET ORAL at 09:08

## 2018-08-25 RX ADMIN — LEVALBUTEROL HYDROCHLORIDE 0.63 MG: 0.63 SOLUTION RESPIRATORY (INHALATION) at 12:08

## 2018-08-25 RX ADMIN — BUDESONIDE 0.5 MG: 0.5 INHALANT RESPIRATORY (INHALATION) at 07:08

## 2018-08-25 RX ADMIN — METOPROLOL SUCCINATE 12.5 MG: 25 TABLET, EXTENDED RELEASE ORAL at 09:08

## 2018-08-25 RX ADMIN — PANTOPRAZOLE SODIUM 40 MG: 40 TABLET, DELAYED RELEASE ORAL at 09:08

## 2018-08-25 RX ADMIN — VANCOMYCIN HYDROCHLORIDE 1500 MG: 1 INJECTION, POWDER, LYOPHILIZED, FOR SOLUTION INTRAVENOUS at 08:08

## 2018-08-25 RX ADMIN — ATORVASTATIN CALCIUM 20 MG: 20 TABLET, FILM COATED ORAL at 09:08

## 2018-08-25 RX ADMIN — CLONAZEPAM 1 MG: 1 TABLET ORAL at 08:08

## 2018-08-25 RX ADMIN — VANCOMYCIN HYDROCHLORIDE 1500 MG: 1 INJECTION, POWDER, LYOPHILIZED, FOR SOLUTION INTRAVENOUS at 09:08

## 2018-08-25 RX ADMIN — APIXABAN 5 MG: 2.5 TABLET, FILM COATED ORAL at 09:08

## 2018-08-25 RX ADMIN — GABAPENTIN 800 MG: 400 CAPSULE ORAL at 09:08

## 2018-08-25 RX ADMIN — HYDROCODONE BITARTRATE AND ACETAMINOPHEN 1 TABLET: 10; 325 TABLET ORAL at 09:08

## 2018-08-25 RX ADMIN — FLUTICASONE FUROATE AND VILANTEROL TRIFENATATE 1 PUFF: 100; 25 POWDER RESPIRATORY (INHALATION) at 08:08

## 2018-08-25 RX ADMIN — PIPERACILLIN SODIUM AND TAZOBACTAM SODIUM 4.5 G: 4; .5 INJECTION, POWDER, FOR SOLUTION INTRAVENOUS at 03:08

## 2018-08-25 RX ADMIN — PIPERACILLIN SODIUM AND TAZOBACTAM SODIUM 4.5 G: 4; .5 INJECTION, POWDER, FOR SOLUTION INTRAVENOUS at 08:08

## 2018-08-25 RX ADMIN — THERA TABS 1 TABLET: TAB at 09:08

## 2018-08-25 RX ADMIN — PIPERACILLIN SODIUM AND TAZOBACTAM SODIUM 4.5 G: 4; .5 INJECTION, POWDER, FOR SOLUTION INTRAVENOUS at 12:08

## 2018-08-25 RX ADMIN — MAGNESIUM HYDROXIDE 4800 MG: 400 SUSPENSION ORAL at 09:08

## 2018-08-25 RX ADMIN — GABAPENTIN 800 MG: 400 CAPSULE ORAL at 03:08

## 2018-08-25 RX ADMIN — SPIRONOLACTONE 25 MG: 25 TABLET ORAL at 09:08

## 2018-08-25 RX ADMIN — TRAZODONE HYDROCHLORIDE 75 MG: 50 TABLET ORAL at 08:08

## 2018-08-25 RX ADMIN — FUROSEMIDE 40 MG: 40 TABLET ORAL at 05:08

## 2018-08-25 RX ADMIN — ACETAMINOPHEN 650 MG: 325 TABLET, FILM COATED ORAL at 05:08

## 2018-08-25 RX ADMIN — FUROSEMIDE 40 MG: 40 TABLET ORAL at 09:08

## 2018-08-25 NOTE — PLAN OF CARE
Problem: Patient Care Overview  Goal: Individualization & Mutuality  AAOx4. Up with assist to bedside commode. Dressing to left abdominal fold changed this shift. Pt tolerated well. IV antibiotics infused per order. BS monitored. No coverage needed. VSS. Remains afebrile throughout my shift. Patient remains fall free throughout my shift. Comfort level established. Good pain control with prn medications. Bed low, brakes locked, SR up x2, call light within reach. Will continue to monitor.

## 2018-08-25 NOTE — PLAN OF CARE
08/25/18 0816   Patient Assessment/Suction   Level of Consciousness (AVPU) alert   All Lung Fields Breath Sounds diminished   PRE-TX-O2-ETCO2   O2 Device (Oxygen Therapy) nasal cannula   $ Is the patient on Low Flow Oxygen? Yes   Flow (L/min) 3   Oxygen Concentration (%) 32   SpO2 97 %   Pulse Oximetry Type Intermittent   $ Pulse Oximetry - Multiple Charge Pulse Oximetry - Multiple   Pulse 97   Resp 18   Aerosol Therapy   $ Aerosol Therapy Charges Aerosol Treatment   Respiratory Treatment Status given   SVN/Inhaler Treatment Route mask   Position During Treatment Sitting in bed   Patient Tolerance good   Post-Treatment   Post-treatment Heart Rate (beats/min) 66   Post-treatment Resp Rate (breaths/min) 18   All Fields Breath Sounds unchanged   Ready to Wean/Extubation Screen   FIO2<=50 (chart decimal) 0.32

## 2018-08-25 NOTE — ASSESSMENT & PLAN NOTE
Pt with chronic non-healing stage 3 pressure ulcer on pannus.   Continue Zosyn and Vancomycin.  Switch to oral antibiotic tomorrow.  CTAP neg for abscess in adipose tissue.

## 2018-08-25 NOTE — PROGRESS NOTES
Date: 8/25/2018   Nelly Tian [id number] is a 67 y.o. female who has been consulted for vancomycin dosing.    The patient has the following labs:     Creatinine (mg/dl)  WBC Count  Serum creatinine: 0.6 mg/dL 08/24/18 0518  Estimated creatinine clearance: 132.4 mL/min  Lab Results  Component  Value  Date  WBC  8.80  08/24/2018    Current weight is 131.5 kg (289 lb 14.5 oz)    The patient has been receiving Vancomycin 1500mg every 12 hours. The vancomycin trough on 8/25 at 0850 was 14.3 (within goal of 10-15).     Continue Vancomycin 1500mg every 12 hour dose. Next vancomycin trough ordered for 8/26 at 2100.    Patient will be followed by pharmacy for changes in renal function, toxicity, and efficacy.      Thank you for allowing us to participate in this patient's care.     Carri Manrique, PharmD

## 2018-08-25 NOTE — PT/OT/SLP PROGRESS
Physical Therapy Treatment    Patient Name:  Nelly Tian   MRN:  [id number]    Recommendations:     Discharge Recommendations:  home       Assessment:     Nelly Tian is a 67 y.o. female admitted with a medical diagnosis of Cellulitis of abdominal wall.  She presents with the following impairments/functional limitations:  weakness, impaired endurance, impaired functional mobilty, gait instability, impaired balance, edema, impaired skin, impaired cardiopulmonary response to activity .    Rehab Prognosis:  good; patient would benefit from acute skilled PT services to address these deficits and reach maximum level of function.      Recent Surgery: * No surgery found *      Plan:     During this hospitalization, patient to be seen 6 x/week to address the above listed problems via gait training, therapeutic activities, therapeutic exercises  · Plan of Care Expires:  09/07/18   Plan of Care Reviewed with: patient    Subjective     Communicated with nurse way prior to session.  Patient found supine upon PT entry to room, agreeable to treatment.      Chief Complaint: none expressed  Patient comments/goals: pt eager to walk  Pain/Comfort:  · Pain Rating 1: (did not express pain)    Patients cultural, spiritual, Advent conflicts given the current situation:      Objective:     Patient found with: peripheral IV, oxygen     General Precautions: Standard, fall   Orthopedic Precautions:N/A   Braces: N/A     Functional Mobility:  · Bed Mobility:     · Supine to Sit: contact guard assistance  · Sit to Supine: contact guard assistance  · Transfers:     · Sit to Stand:  contact guard assistance with no AD  · Gait: 100' with CGA;HHA. pt pushing IV pole        Patient left HOB elevated with all lines intact, call button in reach and nurse rayna notified..    GOALS:   Multidisciplinary Problems     Physical Therapy Goals        Problem: Physical Therapy Goal    Goal Priority Disciplines Outcome Goal Variances  Interventions   Physical Therapy Goal     PT, PT/OT Ongoing (interventions implemented as appropriate)     Description:  Goals to be met by: 2018     Patient will increase functional independence with mobility by performin. Sit to stand transfer with Supervision  2. Gait  x 250 feet with Contact Guard Assistance using Rolling Walker.   3. Lower extremity exercise program x20 reps per handout, with assistance as needed                      Time Tracking:     PT Received On: 18  PT Start Time: 923     PT Stop Time: 931  PT Total Time (min): 8 min     Billable Minutes: Gait Training 8    Treatment Type: Treatment  PT/PTA: PTA     PTA Visit Number: 2     Samia Cunha, MONIKA  2018

## 2018-08-25 NOTE — PROGRESS NOTES
"[facility] Hospital Medicine  Progress Note    Patient Name: Nelly Tian  MRN: [id number]  Patient Class: IP- Inpatient   Admission Date: 8/22/2018  Length of Stay: 3 days  Attending Physician: Diaz Pa MD  Primary Care Provider: Constantine Bird MD        Subjective:     Principal Problem:Cellulitis of abdominal wall    HPI:  Nelly Tian is a 68 y/o female with a PMHx of DM2, HTN, COPD, HLD, CHF, and chronic Afib who presented to the ED with reports of fever and tremors which began suddenly this afternoon.  She had a routine clinic appointment this morning which she states was a "wellness visit" with no symptoms at that time.  She has a history of a non-healing stage 3 pressure wound to the left abdominal wall which is being treated at [facility].  She was seen on 8/17/18 by her PCP for an ulcer to the RLE and was placed on doxycycline with which she reports compliance and states that the wound is improving.  Pt denies headache, chest pain, unusual SOB, diarrhea or dysuria.  She did have some nausea but no vomiting. While in the ED, Ms. Tian was found to meet sepsis criteria and is admitted to the service of hospital medicine for further treatment.    Hospital Course:  No notes on file    Interval History:  Redness is much less.  No fever.    Review of Systems   Constitutional: Negative for chills and fever.   Respiratory: Negative for shortness of breath.    Cardiovascular: Negative for chest pain.   Gastrointestinal: Negative for nausea and vomiting.     Objective:     Vital Signs (Most Recent):  Temp: 98.7 °F (37.1 °C) (08/25/18 1554)  Pulse: 70 (08/25/18 1554)  Resp: 20 (08/25/18 1554)  BP: (!) 102/50 (08/25/18 1554)  SpO2: (!) 93 % (08/25/18 1554) Vital Signs (24h Range):  Temp:  [96.8 °F (36 °C)-98.7 °F (37.1 °C)] 98.7 °F (37.1 °C)  Pulse:  [59-97] 70  Resp:  [18-24] 20  SpO2:  [92 %-99 %] 93 %  BP: (102-135)/(50-60) 102/50     Weight: 131.5 kg (289 lb 14.5 oz)  Body mass index " is 44.08 kg/m².    Intake/Output Summary (Last 24 hours) at 8/25/2018 1825  Last data filed at 8/25/2018 1700  Gross per 24 hour   Intake 1800 ml   Output --   Net 1800 ml      Physical Exam   Constitutional: She is oriented to person, place, and time. No distress.   Morbidly obese   HENT:   Mouth/Throat: Oropharynx is clear and moist.   Eyes: Conjunctivae are normal.   Neck: Neck supple. No JVD present.   Cardiovascular: Normal heart sounds. An irregularly irregular rhythm present. Tachycardia present.   Pulmonary/Chest: Effort normal and breath sounds normal.   Abdominal: Soft. Bowel sounds are normal. There is tenderness.   Musculoskeletal: Normal range of motion. She exhibits no edema.   Neurological: She is alert and oriented to person, place, and time.   Skin: Skin is warm and dry. She is not diaphoretic. There is erythema (improving).   Left lateral abdominal pannus.  There are stage 3 ulcers with surrounding cellulitis.  Ulcers are dressed with gauze.   Nursing note and vitals reviewed.      Significant Labs: All pertinent labs within the past 24 hours have been reviewed.    Significant Imaging: I have reviewed all pertinent imaging results/findings within the past 24 hours.    Assessment/Plan:      * Cellulitis of abdominal wall    Pt with chronic non-healing stage 3 pressure ulcer on pannus.   Continue Zosyn and Vancomycin.  Switch to oral antibiotic tomorrow.  CTAP neg for abscess in adipose tissue.              Decubitus ulcer    Stage 3 located on abdominal wall.  Will continue wound care as she has gotten at [facility].  See orders.          Chronic combined systolic and diastolic CHF (congestive heart failure)    Monitor telemetry  Hold antidiuretics for now 2/2 sepsis  Monitor for s/s of decompensation  Last TTE 2/1/18 with EF 50%        COPD (chronic obstructive pulmonary disease)    Chronic; controlled.  Continue home medications.  Supplemental O2 as needed.          DDD (degenerative disc disease),  lumbar    Chronic; continue chronic pain regimen.          Hypertension associated with diabetes    Chronic; appears controlled.  Resumed her ace inhibitor. Cont BB.  Monitor and treat as clinically indicated.            Morbid obesity with BMI of 40.0-44.9, adult    Body mass index is 44.08 kg/m². Morbid obesity complicates all aspects of disease management from diagnostic modalities to treatment. Weight loss encouraged and health benefits explained to patient.              Chronic atrial fibrillation    Monitor telemetry  Continue beta blocker for rate control as bp tolerates  Continue eliquis          Tobacco dependency    Dangers of cigarette smoking were reviewed with patient in detail for 3 minutes and patient was encouraged to quit. Nicotine replacement options were discussed.  Pt declines nicotine patch.            GERD (gastroesophageal reflux disease)    Chronic; continue PPI          Long term current use of anticoagulant therapy    On eliquis 2/2 chronic atrial fibrillation.  Continue current therapy.          Diabetes mellitus with neuropathy    Chronic; controlled.  Last A1c 6.9 on 6/13/18  Holding metformin.  Accucheck ac/hs with SSI coverage as needed  Recent Labs   Lab  08/25/18   1609   POCTGLUCOSE  135*       Lab Results   Component Value Date    HGBA1C 5.6 08/23/2018               VTE Risk Mitigation (From admission, onward)        Ordered     apixaban tablet 5 mg  2 times daily      08/23/18 0237     IP VTE HIGH RISK PATIENT  Once      08/23/18 0237     Reason for No Pharmacological VTE Prophylaxis  Once      08/23/18 0237              Diaz Pa MD  Department of Hospital Medicine   [facility]

## 2018-08-25 NOTE — ASSESSMENT & PLAN NOTE
Chronic; appears controlled.  Resumed her ace inhibitor. Cont BB.  Monitor and treat as clinically indicated.

## 2018-08-25 NOTE — PROGRESS NOTES
"[facility] Hospital Medicine  Progress Note    Patient Name: Nelly Tian  MRN: [id number]  Patient Class: IP- Inpatient   Admission Date: 8/22/2018  Length of Stay: 2 days  Attending Physician: Diaz Pa MD  Primary Care Provider: Constantine Bird MD        Subjective:     Principal Problem:Cellulitis of abdominal wall    HPI:  Nelly Tian is a 66 y/o female with a PMHx of DM2, HTN, COPD, HLD, CHF, and chronic Afib who presented to the ED with reports of fever and tremors which began suddenly this afternoon.  She had a routine clinic appointment this morning which she states was a "wellness visit" with no symptoms at that time.  She has a history of a non-healing stage 3 pressure wound to the left abdominal wall which is being treated at [facility].  She was seen on 8/17/18 by her PCP for an ulcer to the RLE and was placed on doxycycline with which she reports compliance and states that the wound is improving.  Pt denies headache, chest pain, unusual SOB, diarrhea or dysuria.  She did have some nausea but no vomiting. While in the ED, Ms. Tian was found to meet sepsis criteria and is admitted to the service of hospital medicine for further treatment.    Hospital Course:  No notes on file    Interval History:  Redness is decreasing.  No new issues.    Review of Systems   Constitutional: Negative for chills and fever.   Respiratory: Negative for shortness of breath.    Cardiovascular: Negative for chest pain.   Gastrointestinal: Negative for nausea and vomiting.     Objective:     Vital Signs (Most Recent):  Temp: 97.7 °F (36.5 °C) (08/24/18 1944)  Pulse: 67 (08/24/18 1944)  Resp: 18 (08/24/18 1944)  BP: 135/60 (08/24/18 1944)  SpO2: 98 % (08/24/18 1944) Vital Signs (24h Range):  Temp:  [97.4 °F (36.3 °C)-99.6 °F (37.6 °C)] 97.7 °F (36.5 °C)  Pulse:  [57-71] 67  Resp:  [16-22] 18  SpO2:  [92 %-98 %] 98 %  BP: (104-145)/(52-71) 135/60     Weight: 131.5 kg (289 lb 14.5 oz)  Body mass index " is 44.08 kg/m².    Intake/Output Summary (Last 24 hours) at 8/24/2018 2115  Last data filed at 8/24/2018 0536  Gross per 24 hour   Intake 360 ml   Output 1200 ml   Net -840 ml      Physical Exam   Constitutional: She is oriented to person, place, and time. No distress.   Morbidly obese   HENT:   Mouth/Throat: Oropharynx is clear and moist.   Eyes: Conjunctivae are normal.   Neck: Neck supple. No JVD present.   Cardiovascular: Normal heart sounds. An irregularly irregular rhythm present. Tachycardia present.   Pulmonary/Chest: Effort normal and breath sounds normal.   Abdominal: Soft. Bowel sounds are normal. There is tenderness.   Musculoskeletal: Normal range of motion. She exhibits no edema.   Neurological: She is alert and oriented to person, place, and time.   Skin: Skin is warm and dry. She is not diaphoretic. There is erythema (improving).   Left lateral abdominal pannus.  There are stage 3 ulcers with surrounding cellulitis.  Ulcers are dressed with gauze.   Nursing note and vitals reviewed.      Significant Labs: All pertinent labs within the past 24 hours have been reviewed.    Significant Imaging: I have reviewed all pertinent imaging results/findings within the past 24 hours.    Assessment/Plan:      * Cellulitis of abdominal wall    Pt with chronic non-healing stage 3 pressure ulcer on pannus.   IV zosyn and IV vancomycin initiated in ED-continue  CTAP neg for abscess in adipose tissue.              Decubitus ulcer    Stage 3 located on abdominal wall.  Will continue wound care as she has gotten at [facility].  See orders.          Chronic combined systolic and diastolic CHF (congestive heart failure)    Monitor telemetry  Hold antidiuretics for now 2/2 sepsis  Monitor for s/s of decompensation  Last TTE 2/1/18 with EF 50%        COPD (chronic obstructive pulmonary disease)    Chronic; controlled.  Continue home medications.  Supplemental O2 as needed.          DDD (degenerative disc disease), lumbar     Chronic; continue chronic pain regimen.          Hypertension associated with diabetes    Chronic; appears controlled.  Will resume her ace inhibitor. Cont BB.  Monitor and treat as clinically indicated.            Morbid obesity with BMI of 40.0-44.9, adult    Body mass index is 44.08 kg/m². Morbid obesity complicates all aspects of disease management from diagnostic modalities to treatment. Weight loss encouraged and health benefits explained to patient.              Chronic atrial fibrillation    Monitor telemetry  Continue beta blocker for rate control as bp tolerates  Continue eliquis          Tobacco dependency    Dangers of cigarette smoking were reviewed with patient in detail for 3 minutes and patient was encouraged to quit. Nicotine replacement options were discussed.  Pt declines nicotine patch.            GERD (gastroesophageal reflux disease)    Chronic; continue PPI          Long term current use of anticoagulant therapy    On eliquis 2/2 chronic atrial fibrillation.  Continue current therapy.          Diabetes mellitus with neuropathy    Chronic; controlled.  Last A1c 6.9 on 6/13/18  Holding metformin.  Accucheck ac/hs with SSI coverage as needed  Recent Labs   Lab  08/24/18   1637   POCTGLUCOSE  118*       Lab Results   Component Value Date    HGBA1C 5.6 08/23/2018               VTE Risk Mitigation (From admission, onward)        Ordered     apixaban tablet 5 mg  2 times daily      08/23/18 0237     IP VTE HIGH RISK PATIENT  Once      08/23/18 0237     Reason for No Pharmacological VTE Prophylaxis  Once      08/23/18 0237              Diaz Pa MD  Department of Hospital Medicine   [facility]

## 2018-08-25 NOTE — PLAN OF CARE
08/24/18 1940   Patient Assessment/Suction   Level of Consciousness (AVPU) alert   Respiratory Effort Short of breath   All Lung Fields Breath Sounds diminished   PONCHO Breath Sounds diminished   LLL Breath Sounds diminished   RUL Breath Sounds diminished   RML Breath Sounds diminished   RLL Breath Sounds diminished   PRE-TX-O2-ETCO2   O2 Device (Oxygen Therapy) nasal cannula   $ Is the patient on Low Flow Oxygen? Yes   Flow (L/min) 3   SpO2 96 %   Pulse Oximetry Type Intermittent   $ Pulse Oximetry - Multiple Charge Pulse Oximetry - Multiple   Pulse 69   Resp (!) 22   Aerosol Therapy   $ Aerosol Therapy Charges Aerosol Treatment   Respiratory Treatment Status given   SVN/Inhaler Treatment Route with oxygen;mask   Position During Treatment HOB at 45 degrees   Patient Tolerance good   Post-Treatment   Post-treatment Heart Rate (beats/min) 69   Post-treatment Resp Rate (breaths/min) 20   All Fields Breath Sounds unchanged

## 2018-08-25 NOTE — PLAN OF CARE
08/25/18 0822   Inhaler   $ Inhaler Charges MDI (Metered Dose Inahler) Treatment   Respiratory Treatment Status given   SVN/Inhaler Treatment Route mouthpiece   Patient Tolerance good

## 2018-08-25 NOTE — SUBJECTIVE & OBJECTIVE
Interval History:  Redness is much less.  No fever.    Review of Systems   Constitutional: Negative for chills and fever.   Respiratory: Negative for shortness of breath.    Cardiovascular: Negative for chest pain.   Gastrointestinal: Negative for nausea and vomiting.     Objective:     Vital Signs (Most Recent):  Temp: 98.7 °F (37.1 °C) (08/25/18 1554)  Pulse: 70 (08/25/18 1554)  Resp: 20 (08/25/18 1554)  BP: (!) 102/50 (08/25/18 1554)  SpO2: (!) 93 % (08/25/18 1554) Vital Signs (24h Range):  Temp:  [96.8 °F (36 °C)-98.7 °F (37.1 °C)] 98.7 °F (37.1 °C)  Pulse:  [59-97] 70  Resp:  [18-24] 20  SpO2:  [92 %-99 %] 93 %  BP: (102-135)/(50-60) 102/50     Weight: 131.5 kg (289 lb 14.5 oz)  Body mass index is 44.08 kg/m².    Intake/Output Summary (Last 24 hours) at 8/25/2018 1825  Last data filed at 8/25/2018 1700  Gross per 24 hour   Intake 1800 ml   Output --   Net 1800 ml      Physical Exam   Constitutional: She is oriented to person, place, and time. No distress.   Morbidly obese   HENT:   Mouth/Throat: Oropharynx is clear and moist.   Eyes: Conjunctivae are normal.   Neck: Neck supple. No JVD present.   Cardiovascular: Normal heart sounds. An irregularly irregular rhythm present. Tachycardia present.   Pulmonary/Chest: Effort normal and breath sounds normal.   Abdominal: Soft. Bowel sounds are normal. There is tenderness.   Musculoskeletal: Normal range of motion. She exhibits no edema.   Neurological: She is alert and oriented to person, place, and time.   Skin: Skin is warm and dry. She is not diaphoretic. There is erythema (improving).   Left lateral abdominal pannus.  There are stage 3 ulcers with surrounding cellulitis.  Ulcers are dressed with gauze.   Nursing note and vitals reviewed.      Significant Labs: All pertinent labs within the past 24 hours have been reviewed.    Significant Imaging: I have reviewed all pertinent imaging results/findings within the past 24 hours.

## 2018-08-25 NOTE — ASSESSMENT & PLAN NOTE
Chronic; appears controlled.  Will resume her ace inhibitor. Cont BB.  Monitor and treat as clinically indicated.

## 2018-08-25 NOTE — ASSESSMENT & PLAN NOTE
Stage 3 located on abdominal wall.  Will continue wound care as she has gotten at [facility].  See orders.

## 2018-08-25 NOTE — ASSESSMENT & PLAN NOTE
Chronic; controlled.  Last A1c 6.9 on 6/13/18  Holding metformin.  Accucheck ac/hs with SSI coverage as needed  Recent Labs   Lab  08/25/18   1609   POCTGLUCOSE  135*       Lab Results   Component Value Date    HGBA1C 5.6 08/23/2018

## 2018-08-25 NOTE — ASSESSMENT & PLAN NOTE
Chronic; controlled.  Last A1c 6.9 on 6/13/18  Holding metformin.  Accucheck ac/hs with SSI coverage as needed  Recent Labs   Lab  08/24/18   1637   POCTGLUCOSE  118*       Lab Results   Component Value Date    HGBA1C 5.6 08/23/2018

## 2018-08-25 NOTE — PLAN OF CARE
Problem: Physical Therapy Goal  Goal: Physical Therapy Goal  Goals to be met by: 2018     Patient will increase functional independence with mobility by performin. Sit to stand transfer with Supervision  2. Gait  x 250 feet with Contact Guard Assistance using Rolling Walker.   3. Lower extremity exercise program x20 reps per handout, with assistance as needed     Outcome: Ongoing (interventions implemented as appropriate)  PT for gait training

## 2018-08-25 NOTE — SUBJECTIVE & OBJECTIVE
Interval History:  Redness is decreasing.  No new issues.    Review of Systems   Constitutional: Negative for chills and fever.   Respiratory: Negative for shortness of breath.    Cardiovascular: Negative for chest pain.   Gastrointestinal: Negative for nausea and vomiting.     Objective:     Vital Signs (Most Recent):  Temp: 97.7 °F (36.5 °C) (08/24/18 1944)  Pulse: 67 (08/24/18 1944)  Resp: 18 (08/24/18 1944)  BP: 135/60 (08/24/18 1944)  SpO2: 98 % (08/24/18 1944) Vital Signs (24h Range):  Temp:  [97.4 °F (36.3 °C)-99.6 °F (37.6 °C)] 97.7 °F (36.5 °C)  Pulse:  [57-71] 67  Resp:  [16-22] 18  SpO2:  [92 %-98 %] 98 %  BP: (104-145)/(52-71) 135/60     Weight: 131.5 kg (289 lb 14.5 oz)  Body mass index is 44.08 kg/m².    Intake/Output Summary (Last 24 hours) at 8/24/2018 2115  Last data filed at 8/24/2018 0536  Gross per 24 hour   Intake 360 ml   Output 1200 ml   Net -840 ml      Physical Exam   Constitutional: She is oriented to person, place, and time. No distress.   Morbidly obese   HENT:   Mouth/Throat: Oropharynx is clear and moist.   Eyes: Conjunctivae are normal.   Neck: Neck supple. No JVD present.   Cardiovascular: Normal heart sounds. An irregularly irregular rhythm present. Tachycardia present.   Pulmonary/Chest: Effort normal and breath sounds normal.   Abdominal: Soft. Bowel sounds are normal. There is tenderness.   Musculoskeletal: Normal range of motion. She exhibits no edema.   Neurological: She is alert and oriented to person, place, and time.   Skin: Skin is warm and dry. She is not diaphoretic. There is erythema (improving).   Left lateral abdominal pannus.  There are stage 3 ulcers with surrounding cellulitis.  Ulcers are dressed with gauze.   Nursing note and vitals reviewed.      Significant Labs: All pertinent labs within the past 24 hours have been reviewed.    Significant Imaging: I have reviewed all pertinent imaging results/findings within the past 24 hours.

## 2018-08-26 LAB
POCT GLUCOSE: 131 MG/DL (ref 70–110)
POCT GLUCOSE: 132 MG/DL (ref 70–110)
POCT GLUCOSE: 154 MG/DL (ref 70–110)
POCT GLUCOSE: 170 MG/DL (ref 70–110)
VANCOMYCIN TROUGH SERPL-MCNC: 19.7 UG/ML

## 2018-08-26 PROCEDURE — 27000221 HC OXYGEN, UP TO 24 HOURS

## 2018-08-26 PROCEDURE — 25000242 PHARM REV CODE 250 ALT 637 W/ HCPCS: Performed by: HOSPITALIST

## 2018-08-26 PROCEDURE — 25000003 PHARM REV CODE 250: Performed by: NURSE PRACTITIONER

## 2018-08-26 PROCEDURE — 94761 N-INVAS EAR/PLS OXIMETRY MLT: CPT

## 2018-08-26 PROCEDURE — 94640 AIRWAY INHALATION TREATMENT: CPT

## 2018-08-26 PROCEDURE — 25000003 PHARM REV CODE 250: Performed by: EMERGENCY MEDICINE

## 2018-08-26 PROCEDURE — 25000003 PHARM REV CODE 250: Performed by: HOSPITALIST

## 2018-08-26 PROCEDURE — 63600175 PHARM REV CODE 636 W HCPCS: Performed by: HOSPITALIST

## 2018-08-26 PROCEDURE — 11000001 HC ACUTE MED/SURG PRIVATE ROOM

## 2018-08-26 PROCEDURE — 36415 COLL VENOUS BLD VENIPUNCTURE: CPT

## 2018-08-26 PROCEDURE — 63600175 PHARM REV CODE 636 W HCPCS: Performed by: EMERGENCY MEDICINE

## 2018-08-26 PROCEDURE — 80202 ASSAY OF VANCOMYCIN: CPT

## 2018-08-26 RX ORDER — ALBUTEROL SULFATE 2.5 MG/.5ML
2.5 SOLUTION RESPIRATORY (INHALATION) EVERY 6 HOURS PRN
Status: DISCONTINUED | OUTPATIENT
Start: 2018-08-26 | End: 2018-08-28 | Stop reason: HOSPADM

## 2018-08-26 RX ORDER — ALBUTEROL SULFATE 90 UG/1
2 AEROSOL, METERED RESPIRATORY (INHALATION) EVERY 6 HOURS PRN
Status: DISCONTINUED | OUTPATIENT
Start: 2018-08-26 | End: 2018-08-26

## 2018-08-26 RX ADMIN — POLYETHYLENE GLYCOL (3350) 17 G: 17 POWDER, FOR SOLUTION ORAL at 08:08

## 2018-08-26 RX ADMIN — PIPERACILLIN SODIUM AND TAZOBACTAM SODIUM 4.5 G: 4; .5 INJECTION, POWDER, FOR SOLUTION INTRAVENOUS at 08:08

## 2018-08-26 RX ADMIN — ACETAMINOPHEN 650 MG: 325 TABLET, FILM COATED ORAL at 03:08

## 2018-08-26 RX ADMIN — LEVALBUTEROL HYDROCHLORIDE 0.63 MG: 0.63 SOLUTION RESPIRATORY (INHALATION) at 04:08

## 2018-08-26 RX ADMIN — VANCOMYCIN HYDROCHLORIDE 1500 MG: 1 INJECTION, POWDER, LYOPHILIZED, FOR SOLUTION INTRAVENOUS at 09:08

## 2018-08-26 RX ADMIN — FUROSEMIDE 40 MG: 40 TABLET ORAL at 08:08

## 2018-08-26 RX ADMIN — GABAPENTIN 800 MG: 400 CAPSULE ORAL at 08:08

## 2018-08-26 RX ADMIN — LISINOPRIL 20 MG: 10 TABLET ORAL at 08:08

## 2018-08-26 RX ADMIN — MAGNESIUM HYDROXIDE 4800 MG: 400 SUSPENSION ORAL at 08:08

## 2018-08-26 RX ADMIN — LEVALBUTEROL HYDROCHLORIDE 0.63 MG: 0.63 SOLUTION RESPIRATORY (INHALATION) at 12:08

## 2018-08-26 RX ADMIN — TRAZODONE HYDROCHLORIDE 75 MG: 50 TABLET ORAL at 09:08

## 2018-08-26 RX ADMIN — PIPERACILLIN SODIUM AND TAZOBACTAM SODIUM 4.5 G: 4; .5 INJECTION, POWDER, FOR SOLUTION INTRAVENOUS at 04:08

## 2018-08-26 RX ADMIN — GABAPENTIN 800 MG: 400 CAPSULE ORAL at 03:08

## 2018-08-26 RX ADMIN — HYDROCODONE BITARTRATE AND ACETAMINOPHEN 1 TABLET: 10; 325 TABLET ORAL at 07:08

## 2018-08-26 RX ADMIN — SPIRONOLACTONE 25 MG: 25 TABLET ORAL at 08:08

## 2018-08-26 RX ADMIN — CLONAZEPAM 1 MG: 1 TABLET ORAL at 09:08

## 2018-08-26 RX ADMIN — FLUTICASONE FUROATE AND VILANTEROL TRIFENATATE 1 PUFF: 100; 25 POWDER RESPIRATORY (INHALATION) at 07:08

## 2018-08-26 RX ADMIN — BUDESONIDE 0.5 MG: 0.5 INHALANT RESPIRATORY (INHALATION) at 07:08

## 2018-08-26 RX ADMIN — APIXABAN 5 MG: 2.5 TABLET, FILM COATED ORAL at 09:08

## 2018-08-26 RX ADMIN — THERA TABS 1 TABLET: TAB at 08:08

## 2018-08-26 RX ADMIN — HYDROCODONE BITARTRATE AND ACETAMINOPHEN 1 TABLET: 10; 325 TABLET ORAL at 01:08

## 2018-08-26 RX ADMIN — FUROSEMIDE 40 MG: 40 TABLET ORAL at 05:08

## 2018-08-26 RX ADMIN — PANTOPRAZOLE SODIUM 40 MG: 40 TABLET, DELAYED RELEASE ORAL at 08:08

## 2018-08-26 RX ADMIN — APIXABAN 5 MG: 2.5 TABLET, FILM COATED ORAL at 08:08

## 2018-08-26 RX ADMIN — METOPROLOL SUCCINATE 12.5 MG: 25 TABLET, EXTENDED RELEASE ORAL at 08:08

## 2018-08-26 RX ADMIN — GABAPENTIN 800 MG: 400 CAPSULE ORAL at 09:08

## 2018-08-26 RX ADMIN — LEVALBUTEROL HYDROCHLORIDE 0.63 MG: 0.63 SOLUTION RESPIRATORY (INHALATION) at 07:08

## 2018-08-26 RX ADMIN — OXYCODONE HYDROCHLORIDE AND ACETAMINOPHEN 500 MG: 500 TABLET ORAL at 09:08

## 2018-08-26 RX ADMIN — ATORVASTATIN CALCIUM 20 MG: 20 TABLET, FILM COATED ORAL at 08:08

## 2018-08-26 NOTE — NURSING
Pt c/o of mild SOB, sats 97% on 2L-02 per order. HOB elevated. MD notified of primary nurse stopping IV fluids due to HX of CHF. Lungs sound clear/deminished. Md made aware of the following, and was okay with it. No new orders at this time. Will monitor.

## 2018-08-26 NOTE — PROGRESS NOTES
08/25/18 1932   Patient Assessment/Suction   Level of Consciousness (AVPU) alert   Respiratory Effort Normal;Unlabored   Expansion/Accessory Muscles/Retractions no use of accessory muscles;no retractions;expansion symmetric   All Lung Fields Breath Sounds diminished   Cough Frequency infrequent   PRE-TX-O2-ETCO2   O2 Device (Oxygen Therapy) room air  (placed back on 3L NC, pt took off to use bedside)   SpO2 (!) 92 %   Pulse Oximetry Type Intermittent   Pulse 68   Resp 18   Aerosol Therapy   $ Aerosol Therapy Charges Aerosol Treatment  (pulmicort)   Respiratory Treatment Status given   SVN/Inhaler Treatment Route mask   Position During Treatment Sitting on edge of bed (dangling)   Patient Tolerance good   Post-Treatment   Post-treatment Heart Rate (beats/min) 65   Post-treatment Resp Rate (breaths/min) 20   All Fields Breath Sounds unchanged

## 2018-08-26 NOTE — PLAN OF CARE
Problem: Patient Care Overview  Goal: Individualization & Mutuality  AAOx4. Up with assist to bedside commode.IV antibiotics infused per order. VSS. Remains afebrile throughout my shift. BS monitored. Patient remains fall free throughout my shift. Comfort level established. Good pain control with prn medications. Bed low, brakes locked, SR up x2, call light within reach. Will continue to monitor.

## 2018-08-26 NOTE — PLAN OF CARE
08/26/18 0743   Patient Assessment/Suction   Level of Consciousness (AVPU) alert   Respiratory Effort Unlabored;Normal   Expansion/Accessory Muscles/Retractions no retractions;no use of accessory muscles   All Lung Fields Breath Sounds diminished;clear   Rhythm/Pattern, Respiratory unlabored   Cough Frequency infrequent   Cough Type good;nonproductive   PRE-TX-O2-ETCO2   O2 Device (Oxygen Therapy) nasal cannula   $ Is the patient on Low Flow Oxygen? Yes   Flow (L/min) 3   Oxygen Concentration (%) 32   SpO2 98 %   Pulse Oximetry Type Intermittent   $ Pulse Oximetry - Multiple Charge Pulse Oximetry - Multiple   Pulse 78   Resp 16   Aerosol Therapy   $ Aerosol Therapy Charges Aerosol Treatment   Respiratory Treatment Status given   SVN/Inhaler Treatment Route mask   Position During Treatment Sitting in bed   Patient Tolerance good   Inhaler   $ Inhaler Charges MDI (Metered Dose Inahler) Treatment   Respiratory Treatment Status given   SVN/Inhaler Treatment Route mouthpiece   Patient Tolerance good   Post-Treatment   Post-treatment Heart Rate (beats/min) 66   Post-treatment Resp Rate (breaths/min) 16   All Fields Breath Sounds aeration increased   Ready to Wean/Extubation Screen   FIO2<=50 (chart decimal) 0.32

## 2018-08-27 LAB
BACTERIA BLD CULT: NORMAL
BACTERIA BLD CULT: NORMAL
POCT GLUCOSE: 117 MG/DL (ref 70–110)
POCT GLUCOSE: 140 MG/DL (ref 70–110)
POCT GLUCOSE: 140 MG/DL (ref 70–110)
POCT GLUCOSE: 163 MG/DL (ref 70–110)

## 2018-08-27 PROCEDURE — 25000003 PHARM REV CODE 250: Performed by: HOSPITALIST

## 2018-08-27 PROCEDURE — 11000001 HC ACUTE MED/SURG PRIVATE ROOM

## 2018-08-27 PROCEDURE — 25000003 PHARM REV CODE 250: Performed by: NURSE PRACTITIONER

## 2018-08-27 PROCEDURE — 63600175 PHARM REV CODE 636 W HCPCS: Performed by: HOSPITALIST

## 2018-08-27 PROCEDURE — 25000242 PHARM REV CODE 250 ALT 637 W/ HCPCS: Performed by: HOSPITALIST

## 2018-08-27 PROCEDURE — 25000003 PHARM REV CODE 250: Performed by: EMERGENCY MEDICINE

## 2018-08-27 PROCEDURE — 97116 GAIT TRAINING THERAPY: CPT

## 2018-08-27 PROCEDURE — 27000221 HC OXYGEN, UP TO 24 HOURS

## 2018-08-27 PROCEDURE — 94640 AIRWAY INHALATION TREATMENT: CPT

## 2018-08-27 PROCEDURE — 94761 N-INVAS EAR/PLS OXIMETRY MLT: CPT

## 2018-08-27 PROCEDURE — 63600175 PHARM REV CODE 636 W HCPCS: Performed by: EMERGENCY MEDICINE

## 2018-08-27 RX ORDER — SYRING-NEEDL,DISP,INSUL,0.3 ML 29 G X1/2"
148 SYRINGE, EMPTY DISPOSABLE MISCELLANEOUS ONCE
Status: COMPLETED | OUTPATIENT
Start: 2018-08-27 | End: 2018-08-27

## 2018-08-27 RX ADMIN — PANTOPRAZOLE SODIUM 40 MG: 40 TABLET, DELAYED RELEASE ORAL at 08:08

## 2018-08-27 RX ADMIN — ACETAMINOPHEN 650 MG: 325 TABLET, FILM COATED ORAL at 08:08

## 2018-08-27 RX ADMIN — FUROSEMIDE 40 MG: 40 TABLET ORAL at 08:08

## 2018-08-27 RX ADMIN — GABAPENTIN 800 MG: 400 CAPSULE ORAL at 08:08

## 2018-08-27 RX ADMIN — MAGNESIUM HYDROXIDE 4800 MG: 400 SUSPENSION ORAL at 08:08

## 2018-08-27 RX ADMIN — LEVALBUTEROL HYDROCHLORIDE 0.63 MG: 0.63 SOLUTION RESPIRATORY (INHALATION) at 07:08

## 2018-08-27 RX ADMIN — FUROSEMIDE 40 MG: 40 TABLET ORAL at 05:08

## 2018-08-27 RX ADMIN — GABAPENTIN 800 MG: 400 CAPSULE ORAL at 03:08

## 2018-08-27 RX ADMIN — MAGESIUM CITRATE 148 ML: 1.75 LIQUID ORAL at 10:08

## 2018-08-27 RX ADMIN — VANCOMYCIN HYDROCHLORIDE 1250 MG: 1 INJECTION, POWDER, LYOPHILIZED, FOR SOLUTION INTRAVENOUS at 10:08

## 2018-08-27 RX ADMIN — HYDROCODONE BITARTRATE AND ACETAMINOPHEN 1 TABLET: 10; 325 TABLET ORAL at 08:08

## 2018-08-27 RX ADMIN — BUDESONIDE 0.5 MG: 0.5 INHALANT RESPIRATORY (INHALATION) at 07:08

## 2018-08-27 RX ADMIN — LEVALBUTEROL HYDROCHLORIDE 0.63 MG: 0.63 SOLUTION RESPIRATORY (INHALATION) at 03:08

## 2018-08-27 RX ADMIN — TRAZODONE HYDROCHLORIDE 75 MG: 50 TABLET ORAL at 08:08

## 2018-08-27 RX ADMIN — SPIRONOLACTONE 25 MG: 25 TABLET ORAL at 08:08

## 2018-08-27 RX ADMIN — METOPROLOL SUCCINATE 12.5 MG: 25 TABLET, EXTENDED RELEASE ORAL at 08:08

## 2018-08-27 RX ADMIN — PIPERACILLIN SODIUM AND TAZOBACTAM SODIUM 4.5 G: 4; .5 INJECTION, POWDER, FOR SOLUTION INTRAVENOUS at 12:08

## 2018-08-27 RX ADMIN — PIPERACILLIN SODIUM AND TAZOBACTAM SODIUM 4.5 G: 4; .5 INJECTION, POWDER, FOR SOLUTION INTRAVENOUS at 07:08

## 2018-08-27 RX ADMIN — ACETAMINOPHEN 650 MG: 325 TABLET, FILM COATED ORAL at 12:08

## 2018-08-27 RX ADMIN — APIXABAN 5 MG: 2.5 TABLET, FILM COATED ORAL at 08:08

## 2018-08-27 RX ADMIN — FLUTICASONE FUROATE AND VILANTEROL TRIFENATATE 1 PUFF: 100; 25 POWDER RESPIRATORY (INHALATION) at 07:08

## 2018-08-27 RX ADMIN — OXYCODONE HYDROCHLORIDE AND ACETAMINOPHEN 500 MG: 500 TABLET ORAL at 08:08

## 2018-08-27 RX ADMIN — CLONAZEPAM 1 MG: 1 TABLET ORAL at 08:08

## 2018-08-27 RX ADMIN — LEVALBUTEROL HYDROCHLORIDE 0.63 MG: 0.63 SOLUTION RESPIRATORY (INHALATION) at 12:08

## 2018-08-27 RX ADMIN — THERA TABS 1 TABLET: TAB at 08:08

## 2018-08-27 RX ADMIN — ATORVASTATIN CALCIUM 20 MG: 20 TABLET, FILM COATED ORAL at 08:08

## 2018-08-27 NOTE — ASSESSMENT & PLAN NOTE
Pt with chronic non-healing stage 3 pressure ulcer on pannus.   Staph aureus growing from ulcer culture.  Will continue vancomycin until we get susceptibilities back.  CTAP neg for abscess in adipose tissue.

## 2018-08-27 NOTE — NURSING
Brought supplies into room to change patient's dressings.  Patient declining to have dressings changed at this time.  Will try again in AM.

## 2018-08-27 NOTE — PROGRESS NOTES
"[facility] Hospital Medicine  Progress Note    Patient Name: Nelly Tian  MRN: [id number]  Patient Class: IP- Inpatient   Admission Date: 8/22/2018  Length of Stay: 4 days  Attending Physician: Diaz Pa MD  Primary Care Provider: Constantine Bird MD        Subjective:     Principal Problem:Cellulitis of abdominal wall    HPI:  Nelly Tian is a 66 y/o female with a PMHx of DM2, HTN, COPD, HLD, CHF, and chronic Afib who presented to the ED with reports of fever and tremors which began suddenly this afternoon.  She had a routine clinic appointment this morning which she states was a "wellness visit" with no symptoms at that time.  She has a history of a non-healing stage 3 pressure wound to the left abdominal wall which is being treated at [facility].  She was seen on 8/17/18 by her PCP for an ulcer to the RLE and was placed on doxycycline with which she reports compliance and states that the wound is improving.  Pt denies headache, chest pain, unusual SOB, diarrhea or dysuria.  She did have some nausea but no vomiting. While in the ED, Ms. Tian was found to meet sepsis criteria and is admitted to the service of hospital medicine for further treatment.    Hospital Course:  No notes on file    Interval History:  Redness is much less.  No fever.  No new complaints.  Staph growing from skin culture.    Review of Systems   Constitutional: Negative for chills and fever.   Respiratory: Negative for shortness of breath.    Cardiovascular: Negative for chest pain.   Gastrointestinal: Negative for nausea and vomiting.     Objective:     Vital Signs (Most Recent):  Temp: 97.7 °F (36.5 °C) (08/26/18 1944)  Pulse: 69 (08/26/18 1944)  Resp: 20 (08/26/18 1944)  BP: (!) 121/58 (08/26/18 1944)  SpO2: (!) 92 % (08/26/18 1944) Vital Signs (24h Range):  Temp:  [96.4 °F (35.8 °C)-98.3 °F (36.8 °C)] 97.7 °F (36.5 °C)  Pulse:  [58-78] 69  Resp:  [16-20] 20  SpO2:  [92 %-98 %] 92 %  BP: (100-174)/(46-75) 121/58 "     Weight: 131.5 kg (289 lb 14.5 oz)  Body mass index is 44.08 kg/m².    Intake/Output Summary (Last 24 hours) at 8/26/2018 2025  Last data filed at 8/26/2018 1900  Gross per 24 hour   Intake 2546.67 ml   Output --   Net 2546.67 ml      Physical Exam   Constitutional: She is oriented to person, place, and time. No distress.   Morbidly obese   HENT:   Mouth/Throat: Oropharynx is clear and moist.   Eyes: Conjunctivae are normal.   Neck: Neck supple. No JVD present.   Cardiovascular: Normal rate and normal heart sounds. An irregularly irregular rhythm present.   Pulmonary/Chest: Effort normal and breath sounds normal.   Abdominal: Soft. Bowel sounds are normal. There is tenderness.   Musculoskeletal: Normal range of motion. She exhibits no edema.   Neurological: She is alert and oriented to person, place, and time.   Skin: Skin is warm and dry. She is not diaphoretic. There is erythema (improving).   Left lateral abdominal pannus.  There are stage 3 ulcers with surrounding cellulitis.  Ulcers are dressed with gauze.   Nursing note and vitals reviewed.      Significant Labs: All pertinent labs within the past 24 hours have been reviewed.    Significant Imaging: I have reviewed all pertinent imaging results/findings within the past 24 hours.    Assessment/Plan:      * Cellulitis of abdominal wall    Pt with chronic non-healing stage 3 pressure ulcer on pannus.   Needs 24 hours more of IVAB.  Stop Zosyn.  Continue vancomycin.  Staph growing from skin ulcer culture.  CTAP neg for abscess in adipose tissue.              Decubitus ulcer    Stage 3 located on abdominal wall.  Will continue wound care as she has gotten at [facility].  See orders.          Chronic combined systolic and diastolic CHF (congestive heart failure)    Monitor telemetry  Hold antidiuretics for now 2/2 sepsis  Monitor for s/s of decompensation  Last TTE 2/1/18 with EF 50%        COPD (chronic obstructive pulmonary disease)    Chronic; controlled.  Continue  home medications.  Supplemental O2 as needed.          DDD (degenerative disc disease), lumbar    Chronic; continue chronic pain regimen.          Hypertension associated with diabetes    Chronic; appears controlled.  Resumed her ace inhibitor. Cont BB.  Monitor and treat as clinically indicated.            Morbid obesity with BMI of 40.0-44.9, adult    Body mass index is 44.08 kg/m². Morbid obesity complicates all aspects of disease management from diagnostic modalities to treatment. Weight loss encouraged and health benefits explained to patient.              Chronic atrial fibrillation    Monitor telemetry  Continue beta blocker for rate control as bp tolerates  Continue eliquis          Tobacco dependency    Dangers of cigarette smoking were reviewed with patient in detail for 3 minutes and patient was encouraged to quit. Nicotine replacement options were discussed.  Pt declines nicotine patch.            GERD (gastroesophageal reflux disease)    Chronic; continue PPI          Long term current use of anticoagulant therapy    On eliquis 2/2 chronic atrial fibrillation.  Continue current therapy.          Diabetes mellitus with neuropathy    Chronic; controlled.  Last A1c 6.9 on 6/13/18  Holding metformin.  Accucheck ac/hs with SSI coverage as needed  Recent Labs   Lab  08/26/18   1640   POCTGLUCOSE  154*       Lab Results   Component Value Date    HGBA1C 5.6 08/23/2018               VTE Risk Mitigation (From admission, onward)        Ordered     apixaban tablet 5 mg  2 times daily      08/23/18 0237     IP VTE HIGH RISK PATIENT  Once      08/23/18 0237     Reason for No Pharmacological VTE Prophylaxis  Once      08/23/18 0237              Diaz Pa MD  Department of Hospital Medicine   [facility]

## 2018-08-27 NOTE — ASSESSMENT & PLAN NOTE
Chronic; controlled.  Last A1c 6.9 on 6/13/18  Holding metformin.  Accucheck ac/hs with SSI coverage as needed  Recent Labs   Lab  08/27/18   1530   POCTGLUCOSE  140*       Lab Results   Component Value Date    HGBA1C 5.6 08/23/2018

## 2018-08-27 NOTE — ASSESSMENT & PLAN NOTE
Chronic; controlled.  Last A1c 6.9 on 6/13/18  Holding metformin.  Accucheck ac/hs with SSI coverage as needed  Recent Labs   Lab  08/26/18   1640   POCTGLUCOSE  154*       Lab Results   Component Value Date    HGBA1C 5.6 08/23/2018

## 2018-08-27 NOTE — ASSESSMENT & PLAN NOTE
Pt with chronic non-healing stage 3 pressure ulcer on pannus.   Needs 24 hours more of IVAB.  Stop Zosyn.  Continue vancomycin.  Staph growing from skin ulcer culture.  CTAP neg for abscess in adipose tissue.

## 2018-08-27 NOTE — PLAN OF CARE
Problem: Physical Therapy Goal  Goal: Physical Therapy Goal  Goals to be met by: 2018     Patient will increase functional independence with mobility by performin. Sit to stand transfer with Supervision  2. Gait  x 250 feet with Contact Guard Assistance using Rolling Walker.   3. Lower extremity exercise program x20 reps per handout, with assistance as needed     Outcome: Ongoing (interventions implemented as appropriate)  Ambulated 120' with A for safety with O2 attached and IV in tow.

## 2018-08-27 NOTE — CONSULTS
Nelly Tian [id number] is a 67 y.o. female who has been consulted for vancomycin dosing.    The patient has the following labs:     Date Creatinine (mg/dl)    BUN WBC Count   8/26/2018 Estimated Creatinine Clearance: 132.4 mL/min (based on SCr of 0.6 mg/dL). Lab Results   Component Value Date    BUN 11 08/24/2018     Lab Results   Component Value Date    WBC 8.80 08/24/2018        Current weight is 131.5 kg (289 lb 14.5 oz)    Pt is receiving vancomycin 1500 mg every 12 hours.  Vancomycin trough from 8/26 at 2120 was 19.7 mg/dL.  Target trough range is 10-15 mg/dL.   Trough was drawn on time and anticipate it is supratherapeutic. Pharmacy will decrease current dose.   The patient will be changed to a vancomycin dose of 1250 mg every 12 hours. A vancomycin trough has been ordered prior to 4th dose due 8/28 at 0930.      Patient will be followed by pharmacy for changes in renal function, toxicity, and efficacy.  Thank you for allowing us to participate in this patient's care.     Ammy Carrillo

## 2018-08-27 NOTE — SUBJECTIVE & OBJECTIVE
Interval History:  No diarrhea.  Tolerating the antibiotics.  Still waiting for susceptibilities on the Staph aureus.    Review of Systems   Constitutional: Negative for chills and fever.   Respiratory: Negative for shortness of breath.    Cardiovascular: Negative for chest pain.   Gastrointestinal: Negative for nausea and vomiting.     Objective:     Vital Signs (Most Recent):  Temp: 98.5 °F (36.9 °C) (08/27/18 1155)  Pulse: 62 (08/27/18 1511)  Resp: 16 (08/27/18 1511)  BP: 133/62 (08/27/18 1155)  SpO2: (!) 88 % (08/27/18 1511) Vital Signs (24h Range):  Temp:  [97.7 °F (36.5 °C)-98.8 °F (37.1 °C)] 98.5 °F (36.9 °C)  Pulse:  [61-90] 62  Resp:  [16-20] 16  SpO2:  [88 %-98 %] 88 %  BP: (104-133)/(52-62) 133/62     Weight: 131.5 kg (289 lb 14.5 oz)  Body mass index is 44.08 kg/m².    Intake/Output Summary (Last 24 hours) at 8/27/2018 1757  Last data filed at 8/27/2018 0502  Gross per 24 hour   Intake 420 ml   Output --   Net 420 ml      Physical Exam   Constitutional: She is oriented to person, place, and time. No distress.   Morbidly obese   HENT:   Mouth/Throat: Oropharynx is clear and moist.   Eyes: Conjunctivae are normal.   Neck: Neck supple. No JVD present.   Cardiovascular: Normal rate and normal heart sounds. An irregularly irregular rhythm present.   Pulmonary/Chest: Effort normal and breath sounds normal.   Abdominal: Soft. Bowel sounds are normal. There is tenderness.   Musculoskeletal: Normal range of motion. She exhibits no edema.   Neurological: She is alert and oriented to person, place, and time.   Skin: Skin is warm and dry. She is not diaphoretic. There is erythema (much less than before).   Left lateral abdominal pannus.  There are stage 3 ulcers with surrounding cellulitis.  Ulcers are dressed with gauze.   Nursing note and vitals reviewed.      Significant Labs: All pertinent labs within the past 24 hours have been reviewed.    Significant Imaging: I have reviewed all pertinent imaging  results/findings within the past 24 hours.

## 2018-08-27 NOTE — PROGRESS NOTES
Patient receives aerosol tcx with xopenex 0.63 q8, pulmicort q12 and mdi qday.       08/27/18 0721 08/27/18 0730 08/27/18 0732   Patient Assessment/Suction   Level of Consciousness (AVPU) alert --  --    Respiratory Effort Unlabored --  --    Expansion/Accessory Muscles/Retractions expansion symmetric --  --    All Lung Fields Breath Sounds clear --  --    PONCHO Breath Sounds diminished --  --    LLL Breath Sounds diminished --  --    RUL Breath Sounds diminished --  --    RML Breath Sounds diminished --  --    RLL Breath Sounds diminished --  --    PRE-TX-O2-ETCO2   O2 Device (Oxygen Therapy) nasal cannula --  --    Flow (L/min) 2.5 --  --    SpO2 98 % --  --    Pulse Oximetry Type Intermittent --  --    $ Pulse Oximetry - Multiple Charge Pulse Oximetry - Multiple --  --    Pulse 64 64 --    Resp 18 18 --    Aerosol Therapy   $ Aerosol Therapy Charges Aerosol Treatment Aerosol Treatment --    Respiratory Treatment Status given given --    SVN/Inhaler Treatment Route mask mask --    Position During Treatment Sitting on edge of bed (dangling) Sitting on edge of bed (dangling) --    Patient Tolerance good good --    Inhaler   $ Inhaler Charges --  --  MDI (Metered Dose Inahler) Treatment   Respiratory Treatment Status --  --  given;mouth rinsed post treatment   SVN/Inhaler Treatment Route --  --  mouthpiece   Patient Tolerance --  --  good   Post-Treatment   Post-treatment Heart Rate (beats/min) 67 67 --    Post-treatment Resp Rate (breaths/min) 18 18 --    All Fields Breath Sounds unchanged aeration increased --

## 2018-08-27 NOTE — SUBJECTIVE & OBJECTIVE
Interval History:  Redness is much less.  No fever.  No new complaints.  Staph growing from skin culture.    Review of Systems   Constitutional: Negative for chills and fever.   Respiratory: Negative for shortness of breath.    Cardiovascular: Negative for chest pain.   Gastrointestinal: Negative for nausea and vomiting.     Objective:     Vital Signs (Most Recent):  Temp: 97.7 °F (36.5 °C) (08/26/18 1944)  Pulse: 69 (08/26/18 1944)  Resp: 20 (08/26/18 1944)  BP: (!) 121/58 (08/26/18 1944)  SpO2: (!) 92 % (08/26/18 1944) Vital Signs (24h Range):  Temp:  [96.4 °F (35.8 °C)-98.3 °F (36.8 °C)] 97.7 °F (36.5 °C)  Pulse:  [58-78] 69  Resp:  [16-20] 20  SpO2:  [92 %-98 %] 92 %  BP: (100-174)/(46-75) 121/58     Weight: 131.5 kg (289 lb 14.5 oz)  Body mass index is 44.08 kg/m².    Intake/Output Summary (Last 24 hours) at 8/26/2018 2025  Last data filed at 8/26/2018 1900  Gross per 24 hour   Intake 2546.67 ml   Output --   Net 2546.67 ml      Physical Exam   Constitutional: She is oriented to person, place, and time. No distress.   Morbidly obese   HENT:   Mouth/Throat: Oropharynx is clear and moist.   Eyes: Conjunctivae are normal.   Neck: Neck supple. No JVD present.   Cardiovascular: Normal rate and normal heart sounds. An irregularly irregular rhythm present.   Pulmonary/Chest: Effort normal and breath sounds normal.   Abdominal: Soft. Bowel sounds are normal. There is tenderness.   Musculoskeletal: Normal range of motion. She exhibits no edema.   Neurological: She is alert and oriented to person, place, and time.   Skin: Skin is warm and dry. She is not diaphoretic. There is erythema (improving).   Left lateral abdominal pannus.  There are stage 3 ulcers with surrounding cellulitis.  Ulcers are dressed with gauze.   Nursing note and vitals reviewed.      Significant Labs: All pertinent labs within the past 24 hours have been reviewed.    Significant Imaging: I have reviewed all pertinent imaging results/findings within  the past 24 hours.

## 2018-08-27 NOTE — PLAN OF CARE
Problem: Patient Care Overview  Goal: Plan of Care Review  Outcome: Ongoing (interventions implemented as appropriate)  Plan of care reviewed with patient. Patient verbalized complete understanding.  Iv antibiotics administered as ordered. Dressing changed. Awaiting cultures to result. All fall precautions maintained. Bed in lowest position, locked, call light within reach. Side rails up x's 2. Slip resistant socks maintained.

## 2018-08-27 NOTE — PROGRESS NOTES
"[facility] Hospital Medicine  Progress Note    Patient Name: Nelly Tian  MRN: [id number]  Patient Class: IP- Inpatient   Admission Date: 8/22/2018  Length of Stay: 5 days  Attending Physician: Diaz Pa MD  Primary Care Provider: Constantine Bird MD        Subjective:     Principal Problem:Cellulitis of abdominal wall    HPI:  Nelly Tian is a 68 y/o female with a PMHx of DM2, HTN, COPD, HLD, CHF, and chronic Afib who presented to the ED with reports of fever and tremors which began suddenly this afternoon.  She had a routine clinic appointment this morning which she states was a "wellness visit" with no symptoms at that time.  She has a history of a non-healing stage 3 pressure wound to the left abdominal wall which is being treated at [facility].  She was seen on 8/17/18 by her PCP for an ulcer to the RLE and was placed on doxycycline with which she reports compliance and states that the wound is improving.  Pt denies headache, chest pain, unusual SOB, diarrhea or dysuria.  She did have some nausea but no vomiting. While in the ED, Ms. Tian was found to meet sepsis criteria and is admitted to the service of hospital medicine for further treatment.    Hospital Course:  No notes on file    Interval History:  No diarrhea.  Tolerating the antibiotics.  Still waiting for susceptibilities on the Staph aureus.    Review of Systems   Constitutional: Negative for chills and fever.   Respiratory: Negative for shortness of breath.    Cardiovascular: Negative for chest pain.   Gastrointestinal: Negative for nausea and vomiting.     Objective:     Vital Signs (Most Recent):  Temp: 98.5 °F (36.9 °C) (08/27/18 1155)  Pulse: 62 (08/27/18 1511)  Resp: 16 (08/27/18 1511)  BP: 133/62 (08/27/18 1155)  SpO2: (!) 88 % (08/27/18 1511) Vital Signs (24h Range):  Temp:  [97.7 °F (36.5 °C)-98.8 °F (37.1 °C)] 98.5 °F (36.9 °C)  Pulse:  [61-90] 62  Resp:  [16-20] 16  SpO2:  [88 %-98 %] 88 %  BP: (104-133)/(52-62) " 133/62     Weight: 131.5 kg (289 lb 14.5 oz)  Body mass index is 44.08 kg/m².    Intake/Output Summary (Last 24 hours) at 8/27/2018 1757  Last data filed at 8/27/2018 0502  Gross per 24 hour   Intake 420 ml   Output --   Net 420 ml      Physical Exam   Constitutional: She is oriented to person, place, and time. No distress.   Morbidly obese   HENT:   Mouth/Throat: Oropharynx is clear and moist.   Eyes: Conjunctivae are normal.   Neck: Neck supple. No JVD present.   Cardiovascular: Normal rate and normal heart sounds. An irregularly irregular rhythm present.   Pulmonary/Chest: Effort normal and breath sounds normal.   Abdominal: Soft. Bowel sounds are normal. There is tenderness.   Musculoskeletal: Normal range of motion. She exhibits no edema.   Neurological: She is alert and oriented to person, place, and time.   Skin: Skin is warm and dry. She is not diaphoretic. There is erythema (much less than before).   Left lateral abdominal pannus.  There are stage 3 ulcers with surrounding cellulitis.  Ulcers are dressed with gauze.   Nursing note and vitals reviewed.      Significant Labs: All pertinent labs within the past 24 hours have been reviewed.    Significant Imaging: I have reviewed all pertinent imaging results/findings within the past 24 hours.    Assessment/Plan:      * Cellulitis of abdominal wall    Pt with chronic non-healing stage 3 pressure ulcer on pannus.   Staph aureus growing from ulcer culture.  Will continue vancomycin until we get susceptibilities back.  CTAP neg for abscess in adipose tissue.              Decubitus ulcer    Stage 3 located on abdominal wall.  Will continue wound care as she has gotten at [facility].  See orders.          Chronic combined systolic and diastolic CHF (congestive heart failure)    Monitor telemetry  Hold antidiuretics for now 2/2 sepsis  Monitor for s/s of decompensation  Last TTE 2/1/18 with EF 50%        COPD (chronic obstructive pulmonary disease)    Chronic;  controlled.  Continue home medications.  Supplemental O2 as needed.          DDD (degenerative disc disease), lumbar    Chronic; continue chronic pain regimen.          Hypertension associated with diabetes    Chronic; appears controlled.  Resumed her ace inhibitor. Cont BB.  Monitor and treat as clinically indicated.            Morbid obesity with BMI of 40.0-44.9, adult    Body mass index is 44.08 kg/m². Morbid obesity complicates all aspects of disease management from diagnostic modalities to treatment. Weight loss encouraged and health benefits explained to patient.              Chronic atrial fibrillation    Monitor telemetry  Continue beta blocker for rate control as bp tolerates  Continue eliquis          Tobacco dependency    Dangers of cigarette smoking were reviewed with patient in detail for 3 minutes and patient was encouraged to quit. Nicotine replacement options were discussed.  Pt declines nicotine patch.            GERD (gastroesophageal reflux disease)    Chronic; continue PPI          Long term current use of anticoagulant therapy    On eliquis 2/2 chronic atrial fibrillation.  Continue current therapy.          Diabetes mellitus with neuropathy    Chronic; controlled.  Last A1c 6.9 on 6/13/18  Holding metformin.  Accucheck ac/hs with SSI coverage as needed  Recent Labs   Lab  08/27/18   1530   POCTGLUCOSE  140*       Lab Results   Component Value Date    HGBA1C 5.6 08/23/2018               VTE Risk Mitigation (From admission, onward)        Ordered     apixaban tablet 5 mg  2 times daily      08/23/18 0237     IP VTE HIGH RISK PATIENT  Once      08/23/18 0237     Reason for No Pharmacological VTE Prophylaxis  Once      08/23/18 0237              Diaz Pa MD  Department of Hospital Medicine   [facility]

## 2018-08-27 NOTE — PROGRESS NOTES
08/26/18 1919   Patient Assessment/Suction   Respiratory Effort Normal;Unlabored   All Lung Fields Breath Sounds diminished   Cough Frequency infrequent   Cough Type nonproductive;good   PRE-TX-O2-ETCO2   O2 Device (Oxygen Therapy) nasal cannula   Flow (L/min) 3   Oxygen Concentration (%) 32   SpO2 (unable to obtain)   Pulse Oximetry Type Intermittent   Pulse (unable to obtain)   Resp 20   Aerosol Therapy   $ Aerosol Therapy Charges Aerosol Treatment  (pulmicort)   Respiratory Treatment Status given   SVN/Inhaler Treatment Route mask   Position During Treatment HOB at 45 degrees   Patient Tolerance good   Post-Treatment   Post-treatment Heart Rate (beats/min) (unable to obtain)   Post-treatment Resp Rate (breaths/min) 20   All Fields Breath Sounds unchanged   Ready to Wean/Extubation Screen   FIO2<=50 (chart decimal) 0.32

## 2018-08-27 NOTE — PT/OT/SLP PROGRESS
Physical Therapy Treatment    Patient Name:  Nelly Tian   MRN:  [id number]    Recommendations:     Discharge Recommendations:  home   Discharge Equipment Recommendations: none   Barriers to discharge: None    Assessment:     Nelly Tian is a 67 y.o. female admitted with a medical diagnosis of Cellulitis of abdominal wall.  She presents with the following impairments/functional limitations:  weakness, impaired endurance, impaired functional mobilty, gait instability, impaired balance, impaired cardiopulmonary response to activity, impaired skin, pain .    Rehab Prognosis:  good; patient would benefit from acute skilled PT services to address these deficits and reach maximum level of function.      Recent Surgery: * No surgery found *      Plan:     During this hospitalization, patient to be seen 6 x/week to address the above listed problems via gait training, therapeutic activities, therapeutic exercises  · Plan of Care Expires:  09/07/18   Plan of Care Reviewed with: patient    Subjective     Communicated with good prior to session.  Patient found supine in bed upon PT entry to room, agreeable to treatment.      Chief Complaint: LB pain with activity. Reports having had tylenol already.  Patient comments/goals: to return home  Pain/Comfort:  · Pain Rating 1: other (see comments)(did not rate)  · Location - Orientation 1: generalized  · Location 1: back  · Pain Addressed 1: Pre-medicate for activity, Reposition, Nurse notified    Patients cultural, spiritual, Temple conflicts given the current situation:      Objective:     Patient found with: oxygen, peripheral IV     General Precautions: Standard, fall   Orthopedic Precautions:N/A   Braces:       Functional Mobility:  · Bed Mobility:     · Rolling Left:  stand by assistance  · Rolling Right: stand by assistance  · Supine to Sit: contact guard assistance  · Sit to Supine: contact guard assistance  · Transfers:     · Sit to Stand:  contact guard  assistance with no AD  · Gait: 120' with CGA pushing IV pole along. Reaching for guard rail on occasion due to back pain with acivity.      AM-PAC 6 CLICK MOBILITY          Therapeutic Activities and Exercises:   Ambulated in hallway with A with O2 attached and IV in tow.     Patient left supine with all lines intact, call button in reach and nurse Sourav notified..    GOALS:   Multidisciplinary Problems     Physical Therapy Goals        Problem: Physical Therapy Goal    Goal Priority Disciplines Outcome Goal Variances Interventions   Physical Therapy Goal     PT, PT/OT Ongoing (interventions implemented as appropriate)     Description:  Goals to be met by: 2018     Patient will increase functional independence with mobility by performin. Sit to stand transfer with Supervision  2. Gait  x 250 feet with Contact Guard Assistance using Rolling Walker.   3. Lower extremity exercise program x20 reps per handout, with assistance as needed                      Time Tracking:     PT Received On: 18  PT Start Time: 905     PT Stop Time: 920  PT Total Time (min): 15 min     Billable Minutes: Gait Training 15min    Treatment Type: Treatment  PT/PTA: PTA     PTA Visit Number: 3     Prema Sher PTA  2018

## 2018-08-28 ENCOUNTER — TELEPHONE (OUTPATIENT)
Dept: FAMILY MEDICINE | Facility: CLINIC | Age: 68
End: 2018-08-28

## 2018-08-28 VITALS
RESPIRATION RATE: 20 BRPM | HEIGHT: 68 IN | HEART RATE: 62 BPM | TEMPERATURE: 99 F | SYSTOLIC BLOOD PRESSURE: 137 MMHG | BODY MASS INDEX: 43.93 KG/M2 | WEIGHT: 289.88 LBS | OXYGEN SATURATION: 95 % | DIASTOLIC BLOOD PRESSURE: 64 MMHG

## 2018-08-28 LAB
BASOPHILS # BLD AUTO: 0 K/UL
BASOPHILS NFR BLD: 0.3 %
CREAT SERPL-MCNC: 0.6 MG/DL
DIFFERENTIAL METHOD: ABNORMAL
EOSINOPHIL # BLD AUTO: 0.5 K/UL
EOSINOPHIL NFR BLD: 5 %
ERYTHROCYTE [DISTWIDTH] IN BLOOD BY AUTOMATED COUNT: 16.6 %
EST. GFR  (AFRICAN AMERICAN): >60 ML/MIN/1.73 M^2
EST. GFR  (NON AFRICAN AMERICAN): >60 ML/MIN/1.73 M^2
HCT VFR BLD AUTO: 31.5 %
HGB BLD-MCNC: 9.9 G/DL
LYMPHOCYTES # BLD AUTO: 1.4 K/UL
LYMPHOCYTES NFR BLD: 15.2 %
MCH RBC QN AUTO: 28.9 PG
MCHC RBC AUTO-ENTMCNC: 31.5 G/DL
MCV RBC AUTO: 92 FL
MONOCYTES # BLD AUTO: 1.5 K/UL
MONOCYTES NFR BLD: 15.5 %
NEUTROPHILS # BLD AUTO: 6 K/UL
NEUTROPHILS NFR BLD: 64 %
PLATELET # BLD AUTO: 210 K/UL
PMV BLD AUTO: 9.1 FL
POCT GLUCOSE: 171 MG/DL (ref 70–110)
RBC # BLD AUTO: 3.44 M/UL
VANCOMYCIN TROUGH SERPL-MCNC: 17.1 UG/ML
WBC # BLD AUTO: 9.4 K/UL

## 2018-08-28 PROCEDURE — 25000003 PHARM REV CODE 250: Performed by: EMERGENCY MEDICINE

## 2018-08-28 PROCEDURE — 36415 COLL VENOUS BLD VENIPUNCTURE: CPT

## 2018-08-28 PROCEDURE — 27000221 HC OXYGEN, UP TO 24 HOURS

## 2018-08-28 PROCEDURE — 85025 COMPLETE CBC W/AUTO DIFF WBC: CPT

## 2018-08-28 PROCEDURE — 82565 ASSAY OF CREATININE: CPT

## 2018-08-28 PROCEDURE — 80202 ASSAY OF VANCOMYCIN: CPT

## 2018-08-28 PROCEDURE — 25000003 PHARM REV CODE 250: Performed by: INTERNAL MEDICINE

## 2018-08-28 PROCEDURE — 25000003 PHARM REV CODE 250: Performed by: HOSPITALIST

## 2018-08-28 PROCEDURE — 94640 AIRWAY INHALATION TREATMENT: CPT

## 2018-08-28 PROCEDURE — 25000242 PHARM REV CODE 250 ALT 637 W/ HCPCS: Performed by: HOSPITALIST

## 2018-08-28 PROCEDURE — 94761 N-INVAS EAR/PLS OXIMETRY MLT: CPT

## 2018-08-28 PROCEDURE — 63600175 PHARM REV CODE 636 W HCPCS: Performed by: INTERNAL MEDICINE

## 2018-08-28 RX ORDER — LINEZOLID 600 MG/1
600 TABLET, FILM COATED ORAL EVERY 12 HOURS
Qty: 14 TABLET | Refills: 0 | Status: SHIPPED | OUTPATIENT
Start: 2018-08-28 | End: 2018-08-31 | Stop reason: SINTOL

## 2018-08-28 RX ORDER — VANCOMYCIN HCL IN 5 % DEXTROSE 1G/250ML
1000 PLASTIC BAG, INJECTION (ML) INTRAVENOUS
Status: DISCONTINUED | OUTPATIENT
Start: 2018-08-28 | End: 2018-08-28 | Stop reason: HOSPADM

## 2018-08-28 RX ORDER — AMOXICILLIN 250 MG
1 CAPSULE ORAL 2 TIMES DAILY PRN
COMMUNITY
Start: 2018-08-28 | End: 2021-10-01 | Stop reason: ALTCHOICE

## 2018-08-28 RX ADMIN — LEVALBUTEROL HYDROCHLORIDE 0.63 MG: 0.63 SOLUTION RESPIRATORY (INHALATION) at 07:08

## 2018-08-28 RX ADMIN — PANTOPRAZOLE SODIUM 40 MG: 40 TABLET, DELAYED RELEASE ORAL at 08:08

## 2018-08-28 RX ADMIN — GABAPENTIN 800 MG: 400 CAPSULE ORAL at 08:08

## 2018-08-28 RX ADMIN — FLUTICASONE FUROATE AND VILANTEROL TRIFENATATE 1 PUFF: 100; 25 POWDER RESPIRATORY (INHALATION) at 07:08

## 2018-08-28 RX ADMIN — CLONAZEPAM 1 MG: 1 TABLET ORAL at 10:08

## 2018-08-28 RX ADMIN — LISINOPRIL 20 MG: 10 TABLET ORAL at 08:08

## 2018-08-28 RX ADMIN — THERA TABS 1 TABLET: TAB at 08:08

## 2018-08-28 RX ADMIN — SPIRONOLACTONE 25 MG: 25 TABLET ORAL at 08:08

## 2018-08-28 RX ADMIN — BUDESONIDE 0.5 MG: 0.5 INHALANT RESPIRATORY (INHALATION) at 07:08

## 2018-08-28 RX ADMIN — APIXABAN 5 MG: 2.5 TABLET, FILM COATED ORAL at 08:08

## 2018-08-28 RX ADMIN — VANCOMYCIN HYDROCHLORIDE 1000 MG: 1 INJECTION, POWDER, LYOPHILIZED, FOR SOLUTION INTRAVENOUS at 10:08

## 2018-08-28 RX ADMIN — FUROSEMIDE 40 MG: 40 TABLET ORAL at 08:08

## 2018-08-28 RX ADMIN — LEVALBUTEROL HYDROCHLORIDE 0.63 MG: 0.63 SOLUTION RESPIRATORY (INHALATION) at 12:08

## 2018-08-28 RX ADMIN — ATORVASTATIN CALCIUM 20 MG: 20 TABLET, FILM COATED ORAL at 08:08

## 2018-08-28 RX ADMIN — ACETAMINOPHEN 650 MG: 325 TABLET, FILM COATED ORAL at 08:08

## 2018-08-28 RX ADMIN — METOPROLOL SUCCINATE 12.5 MG: 25 TABLET, EXTENDED RELEASE ORAL at 08:08

## 2018-08-28 NOTE — PROGRESS NOTES
08/28/18 1244   Home Oxygen Qualification   Room Air SpO2 At Rest 92 %   Room Air SpO2 on Exertion (!) 86 %   SpO2 During Exertion on O2 92 %   Heart Rate on O2 124 bpm   Exertion O2 LPM 3 LPM   SpO2 on Recovery 97 %   Recovery Heart Rate 108 bpm   Recovery O2 LPM 3 LPM

## 2018-08-28 NOTE — TELEPHONE ENCOUNTER
----- Message from Murphy Goins sent at 8/28/2018  3:37 PM CDT -----  Type:  Sooner Apoointment Request    Caller is requesting a sooner appointment.  Caller declined first available appointment listed below.  Caller will not accept being placed on the waitlist and is requesting a message be sent to doctor.    Name of Caller:  Tim with [facility]  When is the first available appointment?  12.10.18  Best Call Back Number:  [phone]  Additional Information:  Hospital Follow up

## 2018-08-28 NOTE — TELEPHONE ENCOUNTER
----- Message from Tasha Crespo sent at 8/28/2018  3:51 PM CDT -----  returned call..[phone] (home)

## 2018-08-28 NOTE — PROGRESS NOTES
08/27/18 1940   Patient Assessment/Suction   Level of Consciousness (AVPU) alert   Respiratory Effort Unlabored   Expansion/Accessory Muscles/Retractions no use of accessory muscles;no retractions;expansion symmetric   All Lung Fields Breath Sounds diminished   PONCHO Breath Sounds diminished   LLL Breath Sounds diminished   RUL Breath Sounds diminished   RML Breath Sounds diminished   RLL Breath Sounds diminished   Cough Frequency infrequent   PRE-TX-O2-ETCO2   O2 Device (Oxygen Therapy) nasal cannula   Flow (L/min) 3   Oxygen Concentration (%) 32   SpO2 (!) 93 %   Pulse Oximetry Type Intermittent   Pulse (!) 58   Resp 20   Aerosol Therapy   $ Aerosol Therapy Charges Aerosol Treatment  (pulmicort)   Respiratory Treatment Status given   SVN/Inhaler Treatment Route mask   Position During Treatment HOB at 45 degrees   Patient Tolerance good   Post-Treatment   Post-treatment Heart Rate (beats/min) 60   Post-treatment Resp Rate (breaths/min) 20   All Fields Breath Sounds aeration increased   Ready to Wean/Extubation Screen   FIO2<=50 (chart decimal) 0.32

## 2018-08-28 NOTE — PROGRESS NOTES
08/28/2018@ 9:20am [facility] retrieved response from [place] [facility] sent 08/27/2018 443pm--8/27/2018 4:43:48 PM     Declined: Other  Note: we are unable to accept this humana referral at this time. we are at max quota on humana patients

## 2018-08-28 NOTE — PLAN OF CARE
Problem: Patient Care Overview  Goal: Plan of Care Review  Outcome: Ongoing (interventions implemented as appropriate)  Nc 3 lpm in use with resp txs Q8 hrs tolerates well

## 2018-08-28 NOTE — PLAN OF CARE
Problem: Patient Care Overview  Goal: Plan of Care Review  Outcome: Ongoing (interventions implemented as appropriate)  Pt had an uneventful night free from fall or injury.  Pt slept well with pain controlled with PRN medication and no nausea reported. Pt is able to reposition independently in the bed and transfers to the [facility] with stand by assist.  L abd wall abscess and wounds covered with dressing c,d,i; VSS; PIV in place with IV abx given as ordered.  Pt is awaiting discharge pending wound sensitivities.  POC discussed with pt with an understanding verbalized.  Bedside rails raised x2 with call bell and bed side table within reach.  Nurse rounded hourly to ensure pt safety.

## 2018-08-28 NOTE — PLAN OF CARE
[facility] sent home health referral to [facility] via [facility] system, pending approval       1205-The referral for the patient in [facility], room 302, bed 302 A admitted at 8/22/2018 3:51 PM has been updated by [email or web address].  Update: Accepted by [facility]

## 2018-08-28 NOTE — PLAN OF CARE
Discharge instructions given to patient. Patient verbalized complete understanding.  IV discontinued with catheter intact, pressure applied to site and secured with tape. Patient tolerated well. Patient claimed to have oxygen waiting in her car in the parking lot. Patient educated on needing to be on O2 at all times.

## 2018-08-28 NOTE — PLAN OF CARE
08/28/18 1400   Final Note   Assessment Type Final Discharge Note   Discharge Disposition Home-Health  ([facility])

## 2018-08-28 NOTE — PT/OT/SLP PROGRESS
Physical Therapy      Patient Name:  Nelly Tian   MRN:  [id number]    Patient not seen today secondary to Patient unwilling to participate(reports feeling tired and wanting to be discharged.). Will follow-up 8/29/18.    Prema Sher PTA

## 2018-08-28 NOTE — PLAN OF CARE
"Cm communicated with pt about  Checking on medication at [facility] Linezolid.  He insurance covers it at 100%.  Pt was relieved.  Cm informed pt about HH on ischarge, pt signed the Pt's Choice Disclosure Form and has chosen [facility].    1:25pm  Cm received a call from Kiana at [facility] that pt script for Linezolid was called into [facility] pharmacy on [address].  Cm notified pt and Sourav.  Cm communicated with pt about her oxygen.  Pt currently has oxygen at home with [facility], but needed another O2 eval to change her Oxygen provider in case [facility] picks up her unit.  Cm notified JERO [facility].  She will contact [facility] to find out if pt needs a new unit in the future.  In the mean time, pt has discharged to home with her oxygen previously prescribed by her PCP.  Cm will follow-up with pt if changes are needed with her oxygen.    1:50pm  Cm communicated to pt that she needed her oxygen to leave the floor.  Also, that her family should bring her oxygen up to the floor before she leaves.  Pt stated, "I will be okay.  I have oxygen in the car."  Cm informed pt that she is leaving at her own risk and we will document that she has chosen to leave with out her oxygen. Pt verbalized understanding and agreed.  JUANITA Benjamin was next to me when I informed pt about leaving without oxygen.  Pt really wanted another small traveler oxygen kit for easy traveling. She has the tanks and concentrator at home.   3:40pm  Cm spoke with patient and instructed her to please notify her pulmonologist for a small oxygen traveler.  Also, Dr. Bird's office will call with an appointment.  Pt has an appointment scheduled with [facility] wound clinic for Thursday-per patient.     08/28/18 1150   Discharge Reassessment   Assessment Type Discharge Planning Reassessment   Provided patient/caregiver education on the expected discharge date and the discharge plan Yes   Do you have any problems affording any of your prescribed medications? No   Discharge Plan " A Home with family;Home Health   Patient choice form signed by patient/caregiver Yes

## 2018-08-28 NOTE — CONSULTS
Date: 8/28/2018   Nelly Tian [id number] is a 67 y.o. female who has been consulted for vancomycin dosing.    The patient has the following labs: Scr 0.6 Crcl 132.4 WBC 9.4  Current weight is 131.5 kg (289 lb 14.5 oz)    Pt is receiving vancomycin 1250 mg every 12 hours.  Vancomycin trough from 8/28/2018 at 0902 was 17.1 mg/dL.  Target goal is 10-15 mg/dL. Pharmacy will decrease current dose. The patient will be changed to a vancomycin dose of 1000 mg every 12 hours. The vancomycin trough has been ordered for 8/29 at 2130.  Patient will be followed by pharmacy for changes in renal function, toxicity, and efficacy.    Thank you for allowing us to participate in this patient's care.     Torsten Loza, PharmD

## 2018-08-28 NOTE — DISCHARGE INSTRUCTIONS
Linezolid tablets (English) View Edit Remove  Cellulitis, Discharge Instructions for (English) View Edit Remove  Heart Failure, Discharge Instructions for (English) View Edit Remove  Heart Failure, What is (English) View Edit Remove  Heart Failure: Making Changes to Your Diet (English) View Edit Remove  Heart Failure: Medicines to Help Your Heart (English) View Edit Remove

## 2018-08-28 NOTE — TELEPHONE ENCOUNTER
She has recurrence of skin infection of abdomen wall.  She has congestive heart failure, severe morbid obesity, abdominal ventral hernia, chronic neuropathy, and chronic COPD.  Shower chair needs replacement.

## 2018-08-29 ENCOUNTER — PATIENT OUTREACH (OUTPATIENT)
Dept: ADMINISTRATIVE | Facility: CLINIC | Age: 68
End: 2018-08-29

## 2018-08-29 ENCOUNTER — TELEPHONE (OUTPATIENT)
Dept: FAMILY MEDICINE | Facility: CLINIC | Age: 68
End: 2018-08-29

## 2018-08-29 LAB — BACTERIA SPEC AEROBE CULT: NORMAL

## 2018-08-29 NOTE — TELEPHONE ENCOUNTER
JUANITA Jenkins Staff 4 hours ago (10:45 AM)      Please forward this important TCC information to your provider in order to maximize the post discharge care delivery of this patient.     C3 nurse spoke with Nelly Tian  for a TCC post hospital discharge follow up call. The patient does not have a scheduled HOSFU appointment with Dr. Constantine Bird within 7-14 days post hospital discharge date 8/28/18. C3 nurse was unable to schedule HOSFU appointment in [facility].   Please contact patient and schedule follow up appointment using HOSFU visit type on or before 9/11/18.     Respectfully,   Paul Albright RN, [facility]   [facility] C3     [email or web address]       Please do not reply to this message, as this inbox is not routinely monitored. (Routing comment)

## 2018-08-29 NOTE — PHYSICIAN QUERY
PT Name: Nelly Tian  MR #: [id number]    Physician Query Form - Cause and Effect Relationship Clarification      CDS/: Trish Anglin RN CCDS                Contact information: [email or web address]    This form is a permanent document in the medical record.     Query Date: August 29, 2018    By submitting this query, we are merely seeking further clarification of documentation. Please utilize your independent clinical judgment when addressing the question(s) below.    The Medical record contains the following:  Supporting Clinical Findings   Location in record     Pt with chronic non-healing stage 3 pressure ulcer presents meeting sepsis criteria    Presented febrile, tachycardic, with WBC 29395   My overall impression is sepsis.                                                                                                                                                                                   H&P 8/22     Staph growing from skin ulcer culture.     STAPHYLOCOCCUS AUREUS   Few   Susceptibility pending   Skin mely also present                                                                                                                                                                                      PN 8/26      Lab - wound culture 8/24         Provider, please clarify if there is any correlation between _sepsis_ and _staph aureus.           Are the conditions:     [ x ] Due to or associated with each other     [  ] Unrelated to each other     [  ] Other (Please Specify): _________________________     [  ] Clinically Undetermined

## 2018-08-29 NOTE — TELEPHONE ENCOUNTER
Hospital follow up appointment scheduled for 9-17-18. Patient agreed to appointment date and time. Patient also notified prescription sent to [facility] for shower chair.

## 2018-08-29 NOTE — PATIENT INSTRUCTIONS
Discharge Instructions for Cellulitis  You have been diagnosed with cellulitis. This is an infection in the deepest layer of the skin. In some cases, the infection also affects the muscle. Cellulitis is caused by bacteria. The bacteria can enter the body through broken skin. This can happen with a cut, scratch, animal bite, or an insect bite that has been scratched. You may have been treated in the hospital with antibiotics and fluids. You will likely be given a prescription for antibiotics to take at home. This sheet will help you take care of yourself at home.  Home Care  Take the prescribed antibiotic medication you are given as directed until it is gone. Take it even if you feel better. It treats the infection and stops it from returning. Not taking all of the medication can also make future infections hard to treat.  Keep the infected area clean.  When possible, raise the infected area above the level of your heart. This helps keep swelling down.  Talk to your doctor if you are in pain. Ask what kind of over-the-counter medication you can take for pain.  Apply clean bandages as advised.  Take your temperature once a day for a week.  Wash your hands often to prevent spreading the infection.  In the future, wash your hands before and after you touch cuts, scratches, or bandages. This will help prevent infection.   Follow-Up  Make a follow-up appointment as advised by our staff.    When to Call Your Doctor  Call your doctor immediately if you have any of the following:  Vomiting  Fever of 100.4°F (38°C) or higher, or as directed by your healthcare provider  Shaking chills  Redness that gets worse in or around the infected area  Swelling of the infected area  Pain that gets worse in or around the infected area  Difficulty or pain when moving the joints above or below the infected area  Discharge or pus draining from the area   © 6364-5623 [employer], [address] [place] [address], [place] [address]. All rights  reserved. This information is not intended as a substitute for professional medical care. Always follow your healthcare professional's instructions.

## 2018-08-29 NOTE — HOSPITAL COURSE
Sepsis was felt to be due to infected ulcers and surround cellulitis.  CTA abdomen/pelvis was negative for abscess.  She was treated with IV antibiotics with significant clinical improvement.  ON discharge ulcers appeared clean with mild surrounding erythema.  Will discharge on a course of oral zyvox to complete a course of therapy.  Pt will discharge with home health and follow up with [facility] wound care as she regularly does.  Other significant chronic comorbidities were managed in the hospital and she was appropriate for discharge.    Pt seen and examined on day of discharge.  Pt was feeling better without complaints.  Vitals were stable.  Examination of the left posteriolateral abdominal pannus showed ulcers without purulence with mild surround erythema.  Pt felt ready for discharge. Plan and instructions discussed at length, pt was agreeable to and stable for discharge.

## 2018-08-29 NOTE — DISCHARGE SUMMARY
"[facility] Medicine  Discharge Summary      Patient Name: Nelly Tian  MRN: [id number]  Admission Date: 8/22/2018  Hospital Length of Stay: 6 days  Discharge Date and Time: 8/28/2018  2:18 PM  Attending Physician: No att. providers found   Discharging Provider: Parminder Childress MD  Primary Care Provider: Constantine Bird MD      HPI:   Nelly Tian is a 68 y/o female with a PMHx of DM2, HTN, COPD, HLD, CHF, and chronic Afib who presented to the ED with reports of fever and tremors which began suddenly this afternoon.  She had a routine clinic appointment this morning which she states was a "wellness visit" with no symptoms at that time.  She has a history of a non-healing stage 3 pressure wound to the left abdominal wall which is being treated at [facility].  She was seen on 8/17/18 by her PCP for an ulcer to the RLE and was placed on doxycycline with which she reports compliance and states that the wound is improving.  Pt denies headache, chest pain, unusual SOB, diarrhea or dysuria.  She did have some nausea but no vomiting. While in the ED, Ms. Tian was found to meet sepsis criteria and is admitted to the service of hospital medicine for further treatment.    * No surgery found *      Hospital Course:   Sepsis was felt to be due to infected ulcers and surround cellulitis.  CTA abdomen/pelvis was negative for abscess.  She was treated with IV antibiotics with significant clinical improvement.  ON discharge ulcers appeared clean with mild surrounding erythema.  Will discharge on a course of oral zyvox to complete a course of therapy.  Pt will discharge with home health and follow up with [facility] wound care as she regularly does.  Other significant chronic comorbidities were managed in the hospital and she was appropriate for discharge.    Pt seen and examined on day of discharge.  Pt was feeling better without complaints.  Vitals were stable.  Examination of the left posteriolateral abdominal " pannus showed ulcers without purulence with mild surround erythema.  Pt felt ready for discharge. Plan and instructions discussed at length, pt was agreeable to and stable for discharge.      Consults:     * Cellulitis of abdominal wall    Pt with chronic non-healing stage 3 pressure ulcer on pannus.   Staph aureus growing from ulcer culture.  Will continue vancomycin until we get susceptibilities back.  CTAP neg for abscess in adipose tissue.                Final Active Diagnoses:    Diagnosis Date Noted POA    PRINCIPAL PROBLEM:  Cellulitis of abdominal wall [L03.311] 08/22/2018 Yes    Chronic combined systolic and diastolic CHF (congestive heart failure) [I50.42] 08/23/2018 Yes    Decubitus ulcer [L89.90] 08/23/2018 Yes    COPD (chronic obstructive pulmonary disease) [J44.9] 04/05/2017 Yes    DDD (degenerative disc disease), lumbar [M51.36] 03/29/2016 Yes     Chronic    Hypertension associated with diabetes [E11.59, I10] 01/19/2016 Yes    Chronic atrial fibrillation [I48.2] 11/10/2015 Yes    Morbid obesity with BMI of 40.0-44.9, adult [E66.01, Z68.41] 11/10/2015 Not Applicable    Tobacco dependency [F17.200] 12/18/2014 Yes    GERD (gastroesophageal reflux disease) [K21.9] 11/13/2014 Yes    Long term current use of anticoagulant therapy [Z79.01] 10/03/2012 Not Applicable    Diabetes mellitus with neuropathy [E11.40] 08/13/2012 Yes     Chronic      Problems Resolved During this Admission:    Diagnosis Date Noted Date Resolved POA    Hyperkalemia [E87.5] 04/23/2017 08/23/2018 Yes    Sepsis [A41.9] 04/05/2017 08/23/2018 Yes       Discharged Condition: stable    Disposition: Home-Health Care [facility]    Follow Up:  Follow-up Information     [facility].    Why:  Within 1 week, call for appointment today           Constantine Bird MD In 1 week.    Specialty:  Family Medicine  Why:  daja spoke with Murphy in scheduling, she sent a message to the nurse to schedule and notify pt of appointment  Contact  information:  [address] [place] [address]  [phone]             [facility].    Specialty:  Home Health Services  Why:  Home Health  Contact information:  [address] [place] [address]  [phone]                 Patient Instructions:      Referral to Home health   Referral Priority: Routine Referral Type: Home Health Care   Referral Reason: Specialty Services Required   Requested Specialty: Home Health Services   Number of Visits Requested: 1     Diet diabetic     Activity as tolerated       Significant Diagnostic Studies:     Pending Diagnostic Studies:     None         Medications:  Reconciled Home Medications:      Medication List      START taking these medications    linezolid 600 mg Tab  Commonly known as:  ZYVOX  Take 1 tablet (600 mg total) by mouth every 12 (twelve) hours. for 7 days     senna-docusate 8.6-50 mg 8.6-50 mg per tablet  Commonly known as:  PERICOLACE  Take 1 tablet by mouth 2 (two) times daily as needed for Constipation.        CHANGE how you take these medications    clonazePAM 1 MG tablet  Commonly known as:  KLONOPIN  1 tab po bid prn  What changed:    · how much to take  · how to take this  · when to take this  · reasons to take this  · additional instructions     furosemide 40 MG tablet  Commonly known as:  LASIX  TAKE 1 TABLET ONE TIME DAILY  FOR  FLUID  OVERLOAD  What changed:  See the new instructions.        CONTINUE taking these medications    * albuterol 90 mcg/actuation inhaler  Commonly known as:  VENTOLIN HFA  Inhale 2 puffs into the lungs every 6 (six) hours as needed. Rescue     * albuterol 0.63 mg/3 mL Nebu  Commonly known as:  ACCUNEB  INHALE THE CONTENTS OF 1 VIAL VIA NEBULIZER EVERY 6 HOURS AS NEEDED     apixaban 5 mg Tab  Commonly known as:  ELIQUIS  Take 1 tablet (5 mg total) by mouth 2 (two) times daily.     ascorbic acid (vitamin C) 500 MG tablet  Commonly known as:  VITAMIN C  Take 1 tablet (500 mg total) by mouth every  evening.     aspirin 81 MG EC tablet  Commonly known as:  ECOTRIN  Take 81 mg by mouth once daily.     atorvastatin 20 MG tablet  Commonly known as:  LIPITOR  TAKE 1 TABLET EVERY DAY     BREO ELLIPTA 100-25 mcg/dose diskus inhaler  Generic drug:  fluticasone-vilanterol  INHALE 1 PUFF INTO THE LUNGS ONE TIME DAILY (CONTROLLER)     budesonide 0.5 mg/2 mL nebulizer solution  Commonly known as:  PULMICORT  INHALE THE CONTENTS OF 1 VIAL VIA NEBULIZER EVERY DAY     gabapentin 800 MG tablet  Commonly known as:  NEURONTIN  Take 1 tablet (800 mg total) by mouth 3 (three) times daily.     HYDROcodone-acetaminophen  mg per tablet  Commonly known as:  NORCO  Take 1 tablet by mouth every 8 (eight) hours as needed for Pain (Do not take more than 3 a day. Do not mix with other narcotics.).     KLOR-CON M20 20 MEQ tablet  Generic drug:  potassium chloride SA  TAKE ONE TABLET BY MOUTH ONCE DAILY     levalbuterol 0.63 mg/3 mL nebulizer solution  Commonly known as:  XOPENEX  INHALE THE CONTENTS OF 1 VIAL BY NEBULIZATION 4 times  DAILY.    DX: J43.9     lisinopril 20 MG tablet  Commonly known as:  PRINIVIL,ZESTRIL  TAKE 1 TABLET EVERY DAY     metFORMIN 500 MG tablet  Commonly known as:  GLUCOPHAGE  Take 1 tablet (500 mg total) by mouth 2 (two) times daily with meals.     metoprolol succinate 25 MG 24 hr tablet  Commonly known as:  TOPROL-XL  TAKE 1/2 TABLET EVERY DAY     multivitamin tablet  Commonly known as:  THERAGRAN  Take 1 tablet by mouth once daily.     nystatin powder  Commonly known as:  MYCOSTATIN  Apply topically 4 (four) times daily as needed.     nystatin-triamcinolone cream  Commonly known as:  MYCOLOG II  APPLY SPARINGLY TO AFFECTED AREA(S) 3 TIMES DAILY AS NEEDED     omeprazole 20 MG capsule  Commonly known as:  PRILOSEC  Take 1 capsule (20 mg total) by mouth once daily.     silver sulfADIAZINE 1% 1 % cream  Commonly known as:  SILVADENE  Apply topically once daily.     spironolactone 25 MG tablet  Commonly known  as:  ALDACTONE  Take 1 tablet (25 mg total) by mouth once daily.     traZODone 150 MG tablet  Commonly known as:  DESYREL  TAKE 1 TABLET EVERY EVENING.         * This list has 2 medication(s) that are the same as other medications prescribed for you. Read the directions carefully, and ask your doctor or other care provider to review them with you.            STOP taking these medications    doxycycline 100 MG Cap  Commonly known as:  VIBRAMYCIN     meloxicam 7.5 MG tablet  Commonly known as:  MOBIC        ASK your doctor about these medications    polyethylene glycol 17 gram/dose powder  Commonly known as:  GLYCOLAX  Take 17 g by mouth nightly as needed.  Ask about: Should I take this medication?            Indwelling Lines/Drains at time of discharge:   Lines/Drains/Airways     Pressure Ulcer                 Pressure Ulcer 03/20/17 0829 Stage  days                Time spent on the discharge of patient: 35 minutes  Patient was seen and examined on the date of discharge and determined to be suitable for discharge.         Parminder Childress MD  Department of Hospital Medicine  [facility]

## 2018-08-29 NOTE — TELEPHONE ENCOUNTER
Order for shower chair placed by Dr. Bird. Order faxed to [facility]. Fax confirmation received.

## 2018-08-30 ENCOUNTER — TELEPHONE (OUTPATIENT)
Dept: MEDSURG UNIT | Facility: HOSPITAL | Age: 68
End: 2018-08-30

## 2018-08-31 ENCOUNTER — TELEPHONE (OUTPATIENT)
Dept: FAMILY MEDICINE | Facility: CLINIC | Age: 68
End: 2018-08-31

## 2018-08-31 DIAGNOSIS — B95.8 STAPHYLOCOCCAL INFECTION OF SKIN: Primary | ICD-10-CM

## 2018-08-31 DIAGNOSIS — B96.20 E. COLI UTI (URINARY TRACT INFECTION): ICD-10-CM

## 2018-08-31 DIAGNOSIS — N39.0 E. COLI UTI (URINARY TRACT INFECTION): ICD-10-CM

## 2018-08-31 DIAGNOSIS — L08.9 STAPHYLOCOCCAL INFECTION OF SKIN: Primary | ICD-10-CM

## 2018-08-31 RX ORDER — AMOXICILLIN AND CLAVULANATE POTASSIUM 500; 125 MG/1; MG/1
1 TABLET, FILM COATED ORAL 3 TIMES DAILY
Qty: 30 TABLET | Refills: 0 | Status: SHIPPED | OUTPATIENT
Start: 2018-08-31 | End: 2018-09-10

## 2018-08-31 NOTE — TELEPHONE ENCOUNTER
----- Message from Shira Aguirre sent at 8/31/2018  9:27 AM CDT -----  .Type: Needs Medical Advice    Who Called:  Patient   Symptoms (please be specific):  Side effects of medication given at [facility]  Pharmacy name and phone #:    [facility] [place] - [address]  [address] [place] [address]  Phone: [phone] Fax: [phone]  Best Call Back Number: [phone]  Additional Information: Patient was given Linezolie 600mg, she had symptoms of shakiness and shortness of breath, she called the ER told her to stop taking and contact her primary physician to have medication changed    Thank you

## 2018-08-31 NOTE — TELEPHONE ENCOUNTER
Patient was given Linezolid 600mg, she had symptoms of shakiness and shortness of breath, she called the ER told her to stop taking and contact her primary physician to have medication changed  Please advise.

## 2018-08-31 NOTE — TELEPHONE ENCOUNTER
Discontinue Zyvox due to side effects.  Microbiology reviewed.  Augmentin 500 t.i.d. skin cellulitis and UTI (E coli) 10 days.  Will need follow-up within the next week.

## 2018-09-01 DIAGNOSIS — E11.610 DIABETIC NEUROGENIC ARTHROPATHY: ICD-10-CM

## 2018-09-01 RX ORDER — GABAPENTIN 800 MG/1
TABLET ORAL
Qty: 270 TABLET | Refills: 3 | Status: SHIPPED | OUTPATIENT
Start: 2018-09-01 | End: 2018-10-03 | Stop reason: SDUPTHER

## 2018-09-03 ENCOUNTER — PATIENT MESSAGE (OUTPATIENT)
Dept: FAMILY MEDICINE | Facility: CLINIC | Age: 68
End: 2018-09-03

## 2018-09-03 RX ORDER — POTASSIUM CHLORIDE 1500 MG/1
TABLET, EXTENDED RELEASE ORAL
Qty: 30 TABLET | Refills: 6 | Status: SHIPPED | OUTPATIENT
Start: 2018-09-03 | End: 2018-12-28 | Stop reason: SDUPTHER

## 2018-09-04 ENCOUNTER — OFFICE VISIT (OUTPATIENT)
Dept: OPHTHALMOLOGY | Facility: CLINIC | Age: 68
End: 2018-09-04
Payer: MEDICARE

## 2018-09-04 ENCOUNTER — OUTPATIENT CASE MANAGEMENT (OUTPATIENT)
Dept: ADMINISTRATIVE | Facility: OTHER | Age: 68
End: 2018-09-04

## 2018-09-04 DIAGNOSIS — H52.7 REFRACTIVE ERROR: ICD-10-CM

## 2018-09-04 DIAGNOSIS — H25.041 POSTERIOR SUBCAPSULAR AGE-RELATED CATARACT OF RIGHT EYE: ICD-10-CM

## 2018-09-04 DIAGNOSIS — Z96.1 PSEUDOPHAKIA, LEFT EYE: ICD-10-CM

## 2018-09-04 DIAGNOSIS — M43.12 SPONDYLOLISTHESIS OF CERVICAL REGION: ICD-10-CM

## 2018-09-04 DIAGNOSIS — H43.12 VITREOUS HEMORRHAGE OF LEFT EYE: Primary | ICD-10-CM

## 2018-09-04 DIAGNOSIS — E11.8 TYPE 2 DIABETES MELLITUS WITH COMPLICATION, WITHOUT LONG-TERM CURRENT USE OF INSULIN: ICD-10-CM

## 2018-09-04 DIAGNOSIS — M51.36 LUMBAR DEGENERATIVE DISC DISEASE: ICD-10-CM

## 2018-09-04 DIAGNOSIS — H25.11 NUCLEAR SCLEROSIS, RIGHT: ICD-10-CM

## 2018-09-04 PROCEDURE — 99214 OFFICE O/P EST MOD 30 MIN: CPT | Mod: PBBFAC,PO | Performed by: OPHTHALMOLOGY

## 2018-09-04 PROCEDURE — 99999 PR PBB SHADOW E&M-EST. PATIENT-LVL IV: CPT | Mod: PBBFAC,,, | Performed by: OPHTHALMOLOGY

## 2018-09-04 PROCEDURE — 92014 COMPRE OPH EXAM EST PT 1/>: CPT | Mod: S$PBB,,, | Performed by: OPHTHALMOLOGY

## 2018-09-04 RX ORDER — HYDROCODONE BITARTRATE AND ACETAMINOPHEN 10; 325 MG/1; MG/1
1 TABLET ORAL EVERY 8 HOURS PRN
Qty: 90 TABLET | Refills: 0 | Status: SHIPPED | OUTPATIENT
Start: 2018-09-04 | End: 2018-09-27 | Stop reason: SDUPTHER

## 2018-09-04 RX ORDER — NYSTATIN 100000 [USP'U]/G
POWDER TOPICAL
Qty: 120 G | Refills: 3 | Status: SHIPPED | OUTPATIENT
Start: 2018-09-04 | End: 2018-11-19 | Stop reason: SDUPTHER

## 2018-09-04 NOTE — TELEPHONE ENCOUNTER
Patient notified. Verbalized understanding. Patient has existing hospital follow up appointment for the date of 9-17-18.

## 2018-09-04 NOTE — PROGRESS NOTES
HPI     Presenting Complaint: Pt here today for yearly diabetic eye exam. Pt   states blood sugar has been stable.   Lab Results       Component                Value               Date                       HGBA1C                   5.6                 08/23/2018                 HGBA1C                   6.9 (H)             06/13/2018                 HGBA1C                   6.2 (H)             09/17/2017            No results found for: LABA1C    (+) Pt states near and distance vision has been blurry with current   glasses. Pt states vision in right eye has been stable.     Ophthalmic medication / drops:None    (-) headaches  (-) diplopia   (-) flashes / (+) hx of floaters      Agree with above. Gradual worsening of vision OS, noticed about 6 months   ago, continues to get worse.     Last edited by Kristan Sarah MD on 9/4/2018  3:01 PM.   (History)        ROS     Positive for: Neurological (neuropathy), Endocrine (DM), Cardiovascular   (HTN), Eyes (PPV for RD repair Dr. Patel 11/2008; CE OS Dr. Aguirre   2009), Respiratory (COPD), Heme/Lymph (Eliquis and ASA)    Negative for: Genitourinary (denies nephropathy)    Last edited by Kristan Sarah MD on 9/4/2018  3:11 PM.   (History)        Assessment /Plan     For exam results, see Encounter Report.    Vitreous hemorrhage of left eye    Type 2 diabetes mellitus with complication, without long-term current use of insulin    Nuclear sclerosis, right    Posterior subcapsular age-related cataract of right eye    Refractive error    Pseudophakia, left eye          Vitreous hemorrhage, symptomatic about 6 months, impairs view of retina but periphery looks stable. Pt on ASA and Eliquis. Will refer to Dr. Gardner for further evaluation and treatment.     OD does not show significant retinopathy    NSC/PSC OD stable - surgery not recommended at this time.    Pseudophakia OS stable, open posterior capsule s/p capsulotomy with some blood  staining.    Minimal change in Rx - address VH first

## 2018-09-04 NOTE — TELEPHONE ENCOUNTER
Patient requesting a refill of Hydrocodone.   LR--8-3-18  LOV--8-22-18  FOV--9-17-18  Urine Toxicology--10-25-17  Pain Contract--10-25-17

## 2018-09-07 ENCOUNTER — OUTPATIENT CASE MANAGEMENT (OUTPATIENT)
Dept: ADMINISTRATIVE | Facility: OTHER | Age: 68
End: 2018-09-07

## 2018-09-07 RX ORDER — NYSTATIN AND TRIAMCINOLONE ACETONIDE 100000; 1 [USP'U]/G; MG/G
CREAM TOPICAL
Qty: 30 G | Refills: 1 | Status: SHIPPED | OUTPATIENT
Start: 2018-09-07 | End: 2018-12-10 | Stop reason: SDUPTHER

## 2018-09-07 NOTE — PROGRESS NOTES
9/7/2018  1st Attempt to complete follow-up for Outpatient Care Management; left message requesting return call.  Review of EMR noted recent discharge from [facility] for sepsis.  Patient discharged to home with [facility] on 8/28.  Hospital follow up noted in  Appointments.  JUANITA Zhu

## 2018-09-10 ENCOUNTER — INITIAL CONSULT (OUTPATIENT)
Dept: OPHTHALMOLOGY | Facility: CLINIC | Age: 68
End: 2018-09-10
Payer: MEDICARE

## 2018-09-10 VITALS — DIASTOLIC BLOOD PRESSURE: 59 MMHG | HEART RATE: 65 BPM | SYSTOLIC BLOOD PRESSURE: 111 MMHG

## 2018-09-10 DIAGNOSIS — H33.022 RETINAL DETACHMENT OF LEFT EYE WITH MULTIPLE BREAKS: ICD-10-CM

## 2018-09-10 DIAGNOSIS — H43.12 VITREOUS HEMORRHAGE OF LEFT EYE: Primary | ICD-10-CM

## 2018-09-10 DIAGNOSIS — H43.12 VITREOUS HEMORRHAGE, LEFT: ICD-10-CM

## 2018-09-10 DIAGNOSIS — E11.3293 CONTROLLED TYPE 2 DIABETES MELLITUS WITH BOTH EYES AFFECTED BY MILD NONPROLIFERATIVE RETINOPATHY WITHOUT MACULAR EDEMA, WITHOUT LONG-TERM CURRENT USE OF INSULIN: ICD-10-CM

## 2018-09-10 DIAGNOSIS — H25.11 NUCLEAR SCLEROTIC CATARACT OF RIGHT EYE: ICD-10-CM

## 2018-09-10 PROCEDURE — 92226 PR SPECIAL EYE EXAM, SUBSEQUENT: CPT | Mod: 50,S$PBB,, | Performed by: OPHTHALMOLOGY

## 2018-09-10 PROCEDURE — 99213 OFFICE O/P EST LOW 20 MIN: CPT | Mod: PBBFAC,25,PO | Performed by: OPHTHALMOLOGY

## 2018-09-10 PROCEDURE — 92226 PR SPECIAL EYE EXAM, SUBSEQUENT: CPT | Mod: 50,PBBFAC,PO | Performed by: OPHTHALMOLOGY

## 2018-09-10 PROCEDURE — 92014 COMPRE OPH EXAM EST PT 1/>: CPT | Mod: S$PBB,,, | Performed by: OPHTHALMOLOGY

## 2018-09-10 PROCEDURE — 92134 CPTRZ OPH DX IMG PST SGM RTA: CPT | Mod: PBBFAC,PO | Performed by: OPHTHALMOLOGY

## 2018-09-10 PROCEDURE — 99499 UNLISTED E&M SERVICE: CPT | Mod: S$GLB,,, | Performed by: OPHTHALMOLOGY

## 2018-09-10 PROCEDURE — 99999 PR PBB SHADOW E&M-EST. PATIENT-LVL III: CPT | Mod: PBBFAC,,, | Performed by: OPHTHALMOLOGY

## 2018-09-10 NOTE — LETTER
September 10, 2018      Kristan Sarah MD  [address] [place] [address] [address]  [address]  [facility] [address]           [facility]  [address] [facility] [address] [place] [address]  Phone: [phone]  Fax: [phone]          Patient: Nelly Tian   MR Number: [id number]   YOB: 1950   Date of Visit: 9/10/2018       Dear Dr. Kristan Sarah:    Thank you for referring Nelly Tian to me for evaluation. Attached you will find relevant portions of my assessment and plan of care.    If you have questions, please do not hesitate to call me. I look forward to following Nelly Tian along with you.    Sincerely,    ROSE Gardner MD    Enclosure  CC:  No Recipients    If you would like to receive this communication electronically, please contact [email or web address] or [phone] to request more information on [facility] access.    For providers and/or their staff who would like to refer a patient to [facility], please contact us through our one-stop-shop provider referral line, [facility], at [phone].    If you feel you have received this communication in error or would no longer like to receive these types of communications, please e-mail [email or web address]

## 2018-09-10 NOTE — PROGRESS NOTES
HPI     Eye Problem      Additional comments: consult per Tyrel/VH OS              Comments     Patient here for evaluation of VH OS per Tyrel. She is no longer seeing floaters. Her vision is still not good but it has improved from when this first happened. er DM is under good control    [id number] this am          Last edited by Ammy Whipple on 9/10/2018  8:17 AM. (History)      OCT - NO ME OU      A/P    1. VH OS  No obvious heme today    2. RRD OS  S/p SB #42 with PPV with Patel 11/2008  Stable no new tears or breaks    3. MIld NPDR OU  NO NV  No DME  T2 uncontrolled    4. NS OD  PCIOL OS - p[ost YAG OS    w  3 months OCT/FA OS

## 2018-09-17 ENCOUNTER — OUTPATIENT CASE MANAGEMENT (OUTPATIENT)
Dept: ADMINISTRATIVE | Facility: OTHER | Age: 68
End: 2018-09-17

## 2018-09-17 ENCOUNTER — DOCUMENTATION ONLY (OUTPATIENT)
Dept: FAMILY MEDICINE | Facility: CLINIC | Age: 68
End: 2018-09-17

## 2018-09-17 ENCOUNTER — OFFICE VISIT (OUTPATIENT)
Dept: FAMILY MEDICINE | Facility: CLINIC | Age: 68
End: 2018-09-17
Payer: MEDICARE

## 2018-09-17 VITALS
DIASTOLIC BLOOD PRESSURE: 60 MMHG | HEIGHT: 68 IN | BODY MASS INDEX: 39.16 KG/M2 | HEART RATE: 75 BPM | TEMPERATURE: 98 F | SYSTOLIC BLOOD PRESSURE: 128 MMHG | OXYGEN SATURATION: 95 % | WEIGHT: 258.38 LBS

## 2018-09-17 DIAGNOSIS — Z23 NEEDS FLU SHOT: ICD-10-CM

## 2018-09-17 DIAGNOSIS — L89.90 PRESSURE ULCER, UNSPECIFIED LOCATION, UNSPECIFIED ULCER STAGE: ICD-10-CM

## 2018-09-17 DIAGNOSIS — A41.01 SEPSIS DUE TO METHICILLIN SUSCEPTIBLE STAPHYLOCOCCUS AUREUS: Primary | ICD-10-CM

## 2018-09-17 PROCEDURE — 3078F DIAST BP <80 MM HG: CPT | Mod: CPTII,S$GLB,, | Performed by: NURSE PRACTITIONER

## 2018-09-17 PROCEDURE — 99212 OFFICE O/P EST SF 10 MIN: CPT | Mod: 25,S$GLB,, | Performed by: NURSE PRACTITIONER

## 2018-09-17 PROCEDURE — 1101F PT FALLS ASSESS-DOCD LE1/YR: CPT | Mod: CPTII,S$GLB,, | Performed by: NURSE PRACTITIONER

## 2018-09-17 PROCEDURE — 90662 IIV NO PRSV INCREASED AG IM: CPT | Mod: S$GLB,,, | Performed by: NURSE PRACTITIONER

## 2018-09-17 PROCEDURE — 3074F SYST BP LT 130 MM HG: CPT | Mod: CPTII,S$GLB,, | Performed by: NURSE PRACTITIONER

## 2018-09-17 PROCEDURE — G0008 ADMIN INFLUENZA VIRUS VAC: HCPCS | Mod: S$GLB,,, | Performed by: NURSE PRACTITIONER

## 2018-09-17 RX ORDER — POLYETHYLENE GLYCOL 3350 17 G/17G
17 POWDER, FOR SOLUTION ORAL DAILY
COMMUNITY
Start: 2018-09-04 | End: 2021-10-01 | Stop reason: ALTCHOICE

## 2018-09-17 RX ORDER — GABAPENTIN 600 MG/1
TABLET ORAL
COMMUNITY
Start: 2018-09-05 | End: 2018-09-17 | Stop reason: ALTCHOICE

## 2018-09-17 NOTE — PATIENT INSTRUCTIONS
Check temperature daily, if elevating, call for appointment and come in quickly or go to ER. Chills and sweats are a good indication you are running a fever.     Continue to follow with wound care, see Dr. Bird as you normally would.

## 2018-09-17 NOTE — PROGRESS NOTES
Subjective:       Patient ID: Nelly Tian is a 67 y.o. female.    Chief Complaint: Infection on the left side of her abdomen (In [facility] on 8/22 for this. )  Transitional Care Note  Patient is here for TCC visit, but does not qualify as she is 19 days post discharge and TCC visits must be within 14 days of discharge.   Family and/or Caretaker present at visit?  No.  Diagnostic tests reviewed/disposition: I have reviewed all completed as well as pending diagnostic tests at the time of discharge.  Disease/illness education: Sepsis, checking temperature.   Home health/community services discussion/referrals: Patient has home health established at [facility].   Establishment or re-establishment of referral orders for community resources: No other necessary community resources.   Discussion with other health care providers: No discussion with other health care providers necessary.   Patient has chronic decubiti on her left side and has been treated for same by [facility] wound care for about 6 months. About 3 weeks ago she had shaking chills and couldn't stop shaking. Her daughter called 911 and she was admitted to the hospital from the ER for sepsis. She was treated for it with IV vancomycin and then discharged 6 days later on Zyvox. A few days after taking it, she developed extreme dyspnea. She called the nurse on call at [facility] and was told to stop the zyvox, but did not go to the ER. Dr. Bird (her primary care) ordered her augmentin and she took 10 days of that and has now finished it. Patient removed her own dressings, with copious bleeding noted. Has difficulty walking, but refuses PT.       She is still smoking a few cigarettes daily.   HPI  Review of Systems   Constitutional: Negative for fatigue and fever.   Musculoskeletal: Positive for gait problem.   Skin: Positive for wound. Negative for rash.       Objective:      Physical Exam   Constitutional: She is oriented to person, place, and time. She appears  well-developed and well-nourished. No distress.   HENT:   Head: Normocephalic and atraumatic.   Eyes: Conjunctivae are normal. Right eye exhibits no discharge. Left eye exhibits no discharge. No scleral icterus.   Cardiovascular: Normal rate, regular rhythm and normal heart sounds. Exam reveals no gallop and no friction rub.   No murmur heard.  Pulmonary/Chest: Effort normal and breath sounds normal. No respiratory distress. She has no wheezes. She has no rales.   Neurological: She is alert and oriented to person, place, and time.   Skin: Skin is warm and dry. No rash noted. She is not diaphoretic. No erythema.   Has multiple wounds on left side, all appear clean, superficial, no purulent drainage noted, does have bleeding from the two larger wounds on lateral side.    Psychiatric: She has a normal mood and affect. Her behavior is normal.   Nursing note and vitals reviewed.      Assessment:       1. Sepsis due to methicillin susceptible Staphylococcus aureus    2. Pressure ulcer, unspecified location, unspecified ulcer stage    3. Needs flu shot        Plan:     Sepsis due to methicillin susceptible Staphylococcus aureus    Pressure ulcer, unspecified location, unspecified ulcer stage    Needs flu shot  -     Influenza - High Dose (65+) (PF) (IM)         All wounds cleaned with soap and water, redressed with honey dressing and occlusive foam overlay.      Check temperature daily, if elevating, call for appointment and come in quickly or go to ER. Chills and sweats are a good indication you are running a fever.     Continue to follow with wound care, see Dr. Bird as you normally would.

## 2018-09-17 NOTE — PROGRESS NOTES
"9/17/2018  Summary: Follow up call completed with patient this afternoon.  Patient reports hospital follow up appointment at [facility] this am.  Patient reports no change in medication.  Patient reports feeling fatigued since being in hospital.    Patient is currently being followed by [facility], called and confirmed with [place] office.  Patient is having SN visits providing wound care.  Reviewed with patient S&S of infection with patient.  Patient informed with any fever to call her provider.  Patient reports not having a thermomator at home, [facility] up [facility] and one can be purchased for under $10.  Patient educated on chills/sweats are good indication of fever.  Patient reports that she will get thermometer.  Encouraged patient to keep wounds clean and dry.  Patient reports smoking a couple cigarettes daily, encouraged cessation.  Patient is aware that smoking can trigger her COPD.  Patient reports that her breathing is "fine today".   Encouraged medication compliance.  Patient reports no new needs at this time.  Agreeable to follow up call in couple weeks, encouraged patient to contact me with any needs I may assist her with.      Interventions: Sparkle reviewed COPD triggers, medication compliance and rescue inhalers, encouraged smoking cessation, collaboration with [facility].    Plan: review diet, encourage exercise, close case if no new needs.      Todays [facility] Self-Management Care Plan was developed with the patients/caregivers input and was based on identified barriers from todays assessment.  Goals were written today with the patient/caregiver and the patient has agreed to work towards these goals to improve his/her overall well-being. Patient verbalized understanding of the care plan, goals, and all of today's instructions. Encouraged patient/caregiver to communicate with his/her physician and health care team about health conditions and the treatment plan.  Provided my contact information today " and encouraged patient/caregiver to call me with any questions as needed.

## 2018-09-23 ENCOUNTER — HOSPITAL ENCOUNTER (INPATIENT)
Facility: HOSPITAL | Age: 68
LOS: 3 days | Discharge: HOME HEALTH | DRG: 603 | End: 2018-09-26
Attending: EMERGENCY MEDICINE | Admitting: INTERNAL MEDICINE
Payer: MEDICARE

## 2018-09-23 DIAGNOSIS — A41.9 SEPSIS: ICD-10-CM

## 2018-09-23 DIAGNOSIS — L03.311 CELLULITIS OF ABDOMINAL WALL: Primary | ICD-10-CM

## 2018-09-23 PROBLEM — L03.319 CELLULITIS OF TRUNK: Status: ACTIVE | Noted: 2018-08-22

## 2018-09-23 LAB
ALBUMIN SERPL BCP-MCNC: 3.3 G/DL
ALP SERPL-CCNC: 94 U/L
ALT SERPL W/O P-5'-P-CCNC: 20 U/L
ANION GAP SERPL CALC-SCNC: 12 MMOL/L
APTT BLDCRRT: 27.1 SEC
AST SERPL-CCNC: 17 U/L
BASOPHILS # BLD AUTO: 0 K/UL
BASOPHILS NFR BLD: 0.1 %
BILIRUB SERPL-MCNC: 0.4 MG/DL
BILIRUB UR QL STRIP: NEGATIVE
BNP SERPL-MCNC: 81 PG/ML
BUN SERPL-MCNC: 13 MG/DL
CALCIUM SERPL-MCNC: 9.7 MG/DL
CHLORIDE SERPL-SCNC: 98 MMOL/L
CLARITY UR: CLEAR
CO2 SERPL-SCNC: 30 MMOL/L
COLOR UR: YELLOW
CREAT SERPL-MCNC: 0.7 MG/DL
DIFFERENTIAL METHOD: ABNORMAL
EOSINOPHIL # BLD AUTO: 0.2 K/UL
EOSINOPHIL NFR BLD: 1.2 %
ERYTHROCYTE [DISTWIDTH] IN BLOOD BY AUTOMATED COUNT: 16.6 %
EST. GFR  (AFRICAN AMERICAN): >60 ML/MIN/1.73 M^2
EST. GFR  (NON AFRICAN AMERICAN): >60 ML/MIN/1.73 M^2
GLUCOSE SERPL-MCNC: 95 MG/DL
GLUCOSE UR QL STRIP: NEGATIVE
HCT VFR BLD AUTO: 37.8 %
HGB BLD-MCNC: 11.8 G/DL
HGB UR QL STRIP: NEGATIVE
INR PPP: 1.1
KETONES UR QL STRIP: NEGATIVE
LACTATE SERPL-SCNC: 1.3 MMOL/L
LACTATE SERPL-SCNC: 1.4 MMOL/L
LACTATE SERPL-SCNC: 1.5 MMOL/L
LACTATE SERPL-SCNC: 2.9 MMOL/L
LEUKOCYTE ESTERASE UR QL STRIP: NEGATIVE
LIPASE SERPL-CCNC: 28 U/L
LYMPHOCYTES # BLD AUTO: 1.4 K/UL
LYMPHOCYTES NFR BLD: 9 %
MAGNESIUM SERPL-MCNC: 1.5 MG/DL
MCH RBC QN AUTO: 28.2 PG
MCHC RBC AUTO-ENTMCNC: 31.2 G/DL
MCV RBC AUTO: 90 FL
MONOCYTES # BLD AUTO: 1.5 K/UL
MONOCYTES NFR BLD: 9.2 %
NEUTROPHILS # BLD AUTO: 12.7 K/UL
NEUTROPHILS NFR BLD: 80.5 %
NITRITE UR QL STRIP: NEGATIVE
PH UR STRIP: 6 [PH] (ref 5–8)
PHOSPHATE SERPL-MCNC: 2.7 MG/DL
PLATELET # BLD AUTO: 261 K/UL
PMV BLD AUTO: 8.6 FL
POCT GLUCOSE: 134 MG/DL (ref 70–110)
POCT GLUCOSE: 163 MG/DL (ref 70–110)
POCT GLUCOSE: 84 MG/DL (ref 70–110)
POTASSIUM SERPL-SCNC: 4.7 MMOL/L
PROT SERPL-MCNC: 7.7 G/DL
PROT UR QL STRIP: NEGATIVE
PROTHROMBIN TIME: 10.8 SEC
RBC # BLD AUTO: 4.18 M/UL
SODIUM SERPL-SCNC: 140 MMOL/L
SP GR UR STRIP: 1.01 (ref 1–1.03)
TROPONIN I SERPL DL<=0.01 NG/ML-MCNC: 0.01 NG/ML
URN SPEC COLLECT METH UR: NORMAL
UROBILINOGEN UR STRIP-ACNC: NEGATIVE EU/DL
WBC # BLD AUTO: 15.8 K/UL

## 2018-09-23 PROCEDURE — 99291 CRITICAL CARE FIRST HOUR: CPT | Mod: 25

## 2018-09-23 PROCEDURE — 85730 THROMBOPLASTIN TIME PARTIAL: CPT

## 2018-09-23 PROCEDURE — 81003 URINALYSIS AUTO W/O SCOPE: CPT

## 2018-09-23 PROCEDURE — 25000003 PHARM REV CODE 250: Performed by: EMERGENCY MEDICINE

## 2018-09-23 PROCEDURE — 85025 COMPLETE CBC W/AUTO DIFF WBC: CPT

## 2018-09-23 PROCEDURE — 83690 ASSAY OF LIPASE: CPT

## 2018-09-23 PROCEDURE — 83605 ASSAY OF LACTIC ACID: CPT | Mod: 91

## 2018-09-23 PROCEDURE — 94640 AIRWAY INHALATION TREATMENT: CPT

## 2018-09-23 PROCEDURE — 82962 GLUCOSE BLOOD TEST: CPT

## 2018-09-23 PROCEDURE — 63600175 PHARM REV CODE 636 W HCPCS: Performed by: EMERGENCY MEDICINE

## 2018-09-23 PROCEDURE — 83880 ASSAY OF NATRIURETIC PEPTIDE: CPT

## 2018-09-23 PROCEDURE — 36415 COLL VENOUS BLD VENIPUNCTURE: CPT

## 2018-09-23 PROCEDURE — 84484 ASSAY OF TROPONIN QUANT: CPT

## 2018-09-23 PROCEDURE — 11000001 HC ACUTE MED/SURG PRIVATE ROOM

## 2018-09-23 PROCEDURE — 99285 EMERGENCY DEPT VISIT HI MDM: CPT | Mod: 25

## 2018-09-23 PROCEDURE — 83036 HEMOGLOBIN GLYCOSYLATED A1C: CPT

## 2018-09-23 PROCEDURE — 96374 THER/PROPH/DIAG INJ IV PUSH: CPT

## 2018-09-23 PROCEDURE — 87040 BLOOD CULTURE FOR BACTERIA: CPT | Mod: 59

## 2018-09-23 PROCEDURE — 84145 PROCALCITONIN (PCT): CPT

## 2018-09-23 PROCEDURE — 93010 ELECTROCARDIOGRAM REPORT: CPT | Mod: ,,, | Performed by: INTERNAL MEDICINE

## 2018-09-23 PROCEDURE — 93005 ELECTROCARDIOGRAM TRACING: CPT

## 2018-09-23 PROCEDURE — 25000242 PHARM REV CODE 250 ALT 637 W/ HCPCS: Performed by: INTERNAL MEDICINE

## 2018-09-23 PROCEDURE — P9612 CATHETERIZE FOR URINE SPEC: HCPCS

## 2018-09-23 PROCEDURE — 83735 ASSAY OF MAGNESIUM: CPT

## 2018-09-23 PROCEDURE — 25000003 PHARM REV CODE 250: Performed by: INTERNAL MEDICINE

## 2018-09-23 PROCEDURE — 80053 COMPREHEN METABOLIC PANEL: CPT

## 2018-09-23 PROCEDURE — 84100 ASSAY OF PHOSPHORUS: CPT

## 2018-09-23 PROCEDURE — 85610 PROTHROMBIN TIME: CPT

## 2018-09-23 RX ORDER — MICONAZOLE NITRATE 2 %
POWDER (GRAM) TOPICAL 2 TIMES DAILY
Status: DISCONTINUED | OUTPATIENT
Start: 2018-09-23 | End: 2018-09-26 | Stop reason: HOSPADM

## 2018-09-23 RX ORDER — ACETAMINOPHEN 500 MG
1000 TABLET ORAL EVERY 6 HOURS PRN
Status: DISCONTINUED | OUTPATIENT
Start: 2018-09-23 | End: 2018-09-26 | Stop reason: HOSPADM

## 2018-09-23 RX ORDER — POTASSIUM CHLORIDE 20 MEQ/15ML
40 SOLUTION ORAL
Status: DISCONTINUED | OUTPATIENT
Start: 2018-09-23 | End: 2018-09-26 | Stop reason: HOSPADM

## 2018-09-23 RX ORDER — HYDROCODONE BITARTRATE AND ACETAMINOPHEN 10; 325 MG/1; MG/1
1 TABLET ORAL
Status: COMPLETED | OUTPATIENT
Start: 2018-09-23 | End: 2018-09-23

## 2018-09-23 RX ORDER — IPRATROPIUM BROMIDE 0.5 MG/2.5ML
0.5 SOLUTION RESPIRATORY (INHALATION) EVERY 6 HOURS
Status: DISCONTINUED | OUTPATIENT
Start: 2018-09-23 | End: 2018-09-23

## 2018-09-23 RX ORDER — LANOLIN ALCOHOL/MO/W.PET/CERES
800 CREAM (GRAM) TOPICAL
Status: DISCONTINUED | OUTPATIENT
Start: 2018-09-23 | End: 2018-09-26 | Stop reason: HOSPADM

## 2018-09-23 RX ORDER — CLONAZEPAM 0.5 MG/1
1 TABLET ORAL
Status: COMPLETED | OUTPATIENT
Start: 2018-09-23 | End: 2018-09-23

## 2018-09-23 RX ORDER — ONDANSETRON 2 MG/ML
8 INJECTION INTRAMUSCULAR; INTRAVENOUS EVERY 8 HOURS PRN
Status: DISCONTINUED | OUTPATIENT
Start: 2018-09-23 | End: 2018-09-26 | Stop reason: HOSPADM

## 2018-09-23 RX ORDER — FUROSEMIDE 40 MG/1
40 TABLET ORAL DAILY
Status: DISCONTINUED | OUTPATIENT
Start: 2018-09-24 | End: 2018-09-26 | Stop reason: HOSPADM

## 2018-09-23 RX ORDER — TRAZODONE HYDROCHLORIDE 50 MG/1
150 TABLET ORAL NIGHTLY
Status: DISCONTINUED | OUTPATIENT
Start: 2018-09-23 | End: 2018-09-26 | Stop reason: HOSPADM

## 2018-09-23 RX ORDER — POTASSIUM CHLORIDE 20 MEQ/1
20 TABLET, EXTENDED RELEASE ORAL DAILY
Status: DISCONTINUED | OUTPATIENT
Start: 2018-09-24 | End: 2018-09-26 | Stop reason: HOSPADM

## 2018-09-23 RX ORDER — ASCORBIC ACID 500 MG
500 TABLET ORAL NIGHTLY
Status: DISCONTINUED | OUTPATIENT
Start: 2018-09-23 | End: 2018-09-26 | Stop reason: HOSPADM

## 2018-09-23 RX ORDER — ATORVASTATIN CALCIUM 20 MG/1
20 TABLET, FILM COATED ORAL DAILY
Status: DISCONTINUED | OUTPATIENT
Start: 2018-09-24 | End: 2018-09-26 | Stop reason: HOSPADM

## 2018-09-23 RX ORDER — ALBUTEROL SULFATE 2.5 MG/.5ML
5 SOLUTION RESPIRATORY (INHALATION) EVERY 4 HOURS PRN
Status: DISCONTINUED | OUTPATIENT
Start: 2018-09-23 | End: 2018-09-26 | Stop reason: HOSPADM

## 2018-09-23 RX ORDER — IPRATROPIUM BROMIDE 0.5 MG/2.5ML
0.5 SOLUTION RESPIRATORY (INHALATION) EVERY 8 HOURS
Status: DISCONTINUED | OUTPATIENT
Start: 2018-09-24 | End: 2018-09-26 | Stop reason: HOSPADM

## 2018-09-23 RX ORDER — PANTOPRAZOLE SODIUM 40 MG/1
40 TABLET, DELAYED RELEASE ORAL DAILY
Status: DISCONTINUED | OUTPATIENT
Start: 2018-09-24 | End: 2018-09-26 | Stop reason: HOSPADM

## 2018-09-23 RX ORDER — GABAPENTIN 400 MG/1
800 CAPSULE ORAL 3 TIMES DAILY
Status: DISCONTINUED | OUTPATIENT
Start: 2018-09-23 | End: 2018-09-26 | Stop reason: HOSPADM

## 2018-09-23 RX ORDER — IBUPROFEN 200 MG
16 TABLET ORAL
Status: DISCONTINUED | OUTPATIENT
Start: 2018-09-23 | End: 2018-09-26 | Stop reason: HOSPADM

## 2018-09-23 RX ORDER — LISINOPRIL 10 MG/1
20 TABLET ORAL DAILY
Status: DISCONTINUED | OUTPATIENT
Start: 2018-09-24 | End: 2018-09-26 | Stop reason: HOSPADM

## 2018-09-23 RX ORDER — GLUCAGON 1 MG
1 KIT INJECTION
Status: DISCONTINUED | OUTPATIENT
Start: 2018-09-23 | End: 2018-09-26 | Stop reason: HOSPADM

## 2018-09-23 RX ORDER — BUDESONIDE 0.5 MG/2ML
0.5 INHALANT ORAL EVERY 12 HOURS
Status: DISCONTINUED | OUTPATIENT
Start: 2018-09-23 | End: 2018-09-26 | Stop reason: HOSPADM

## 2018-09-23 RX ORDER — CLONAZEPAM 1 MG/1
1 TABLET ORAL 2 TIMES DAILY PRN
Status: DISCONTINUED | OUTPATIENT
Start: 2018-09-23 | End: 2018-09-26 | Stop reason: HOSPADM

## 2018-09-23 RX ORDER — NYSTATIN AND TRIAMCINOLONE ACETONIDE 100000; 1 [USP'U]/G; MG/G
CREAM TOPICAL 3 TIMES DAILY
Status: DISCONTINUED | OUTPATIENT
Start: 2018-09-23 | End: 2018-09-26 | Stop reason: HOSPADM

## 2018-09-23 RX ORDER — RAMELTEON 8 MG/1
8 TABLET ORAL NIGHTLY PRN
Status: DISCONTINUED | OUTPATIENT
Start: 2018-09-23 | End: 2018-09-26 | Stop reason: HOSPADM

## 2018-09-23 RX ORDER — ASPIRIN 81 MG/1
81 TABLET ORAL DAILY
Status: DISCONTINUED | OUTPATIENT
Start: 2018-09-24 | End: 2018-09-26 | Stop reason: HOSPADM

## 2018-09-23 RX ORDER — IBUPROFEN 200 MG
24 TABLET ORAL
Status: DISCONTINUED | OUTPATIENT
Start: 2018-09-23 | End: 2018-09-26 | Stop reason: HOSPADM

## 2018-09-23 RX ORDER — SPIRONOLACTONE 25 MG/1
25 TABLET ORAL DAILY
Status: DISCONTINUED | OUTPATIENT
Start: 2018-09-24 | End: 2018-09-26 | Stop reason: HOSPADM

## 2018-09-23 RX ORDER — AMOXICILLIN 250 MG
1 CAPSULE ORAL 2 TIMES DAILY
Status: DISCONTINUED | OUTPATIENT
Start: 2018-09-23 | End: 2018-09-26 | Stop reason: HOSPADM

## 2018-09-23 RX ORDER — HYDROCODONE BITARTRATE AND ACETAMINOPHEN 10; 325 MG/1; MG/1
1 TABLET ORAL EVERY 8 HOURS PRN
Status: DISCONTINUED | OUTPATIENT
Start: 2018-09-23 | End: 2018-09-26 | Stop reason: HOSPADM

## 2018-09-23 RX ORDER — SODIUM CHLORIDE 0.9 % (FLUSH) 0.9 %
5 SYRINGE (ML) INJECTION
Status: DISCONTINUED | OUTPATIENT
Start: 2018-09-23 | End: 2018-09-26 | Stop reason: HOSPADM

## 2018-09-23 RX ORDER — ACETAMINOPHEN 500 MG
1000 TABLET ORAL
Status: COMPLETED | OUTPATIENT
Start: 2018-09-23 | End: 2018-09-23

## 2018-09-23 RX ORDER — LEVALBUTEROL INHALATION SOLUTION 0.63 MG/3ML
0.63 SOLUTION RESPIRATORY (INHALATION) EVERY 8 HOURS
Status: DISCONTINUED | OUTPATIENT
Start: 2018-09-24 | End: 2018-09-26 | Stop reason: HOSPADM

## 2018-09-23 RX ORDER — INSULIN ASPART 100 [IU]/ML
1-10 INJECTION, SOLUTION INTRAVENOUS; SUBCUTANEOUS
Status: DISCONTINUED | OUTPATIENT
Start: 2018-09-23 | End: 2018-09-26 | Stop reason: HOSPADM

## 2018-09-23 RX ADMIN — GABAPENTIN 800 MG: 400 CAPSULE ORAL at 08:09

## 2018-09-23 RX ADMIN — HYDROCODONE BITARTRATE AND ACETAMINOPHEN 1 TABLET: 10; 325 TABLET ORAL at 08:09

## 2018-09-23 RX ADMIN — SODIUM CHLORIDE 1000 ML: 0.9 INJECTION, SOLUTION INTRAVENOUS at 02:09

## 2018-09-23 RX ADMIN — Medication: at 08:09

## 2018-09-23 RX ADMIN — LEVALBUTEROL HYDROCHLORIDE 0.63 MG: 0.63 SOLUTION RESPIRATORY (INHALATION) at 11:09

## 2018-09-23 RX ADMIN — OXYCODONE HYDROCHLORIDE AND ACETAMINOPHEN 500 MG: 500 TABLET ORAL at 08:09

## 2018-09-23 RX ADMIN — IPRATROPIUM BROMIDE 0.5 MG: 0.5 SOLUTION RESPIRATORY (INHALATION) at 11:09

## 2018-09-23 RX ADMIN — BUDESONIDE 0.5 MG: 0.5 INHALANT RESPIRATORY (INHALATION) at 08:09

## 2018-09-23 RX ADMIN — HYDROCODONE BITARTRATE AND ACETAMINOPHEN 1 TABLET: 10; 325 TABLET ORAL at 03:09

## 2018-09-23 RX ADMIN — NYSTATIN AND TRIAMCINOLONE ACETONIDE: 100000; 1 CREAM TOPICAL at 08:09

## 2018-09-23 RX ADMIN — CLONAZEPAM 1 MG: 1 TABLET ORAL at 08:09

## 2018-09-23 RX ADMIN — VANCOMYCIN HYDROCHLORIDE 2000 MG: 1 INJECTION, POWDER, LYOPHILIZED, FOR SOLUTION INTRAVENOUS at 03:09

## 2018-09-23 RX ADMIN — ACETAMINOPHEN 1000 MG: 500 TABLET, FILM COATED ORAL at 08:09

## 2018-09-23 RX ADMIN — CLONAZEPAM 1 MG: 0.5 TABLET ORAL at 03:09

## 2018-09-23 RX ADMIN — PIPERACILLIN SODIUM AND TAZOBACTAM SODIUM 4.5 G: 4; .5 INJECTION, POWDER, FOR SOLUTION INTRAVENOUS at 06:09

## 2018-09-23 RX ADMIN — SENNOSIDES AND DOCUSATE SODIUM 1 TABLET: 8.6; 5 TABLET ORAL at 09:09

## 2018-09-23 RX ADMIN — TRAZODONE HYDROCHLORIDE 50 MG: 50 TABLET ORAL at 08:09

## 2018-09-23 RX ADMIN — ACETAMINOPHEN 1000 MG: 500 TABLET ORAL at 02:09

## 2018-09-23 NOTE — SUBJECTIVE & OBJECTIVE
Past Medical History:   Diagnosis Date    *Atrial flutter     Angina pectoris 2017    Anxiety     Arthritis     Asthma     Atrial fibrillation     Back pain     Cataract     OD    CHF (congestive heart failure)     COPD (chronic obstructive pulmonary disease)     Depression     Diabetes mellitus     Emphysema of lung     Heart failure     Hepatomegaly 2/3/2016    Hernia     History of MI (myocardial infarction) 2016    Hypercapnic respiratory failure, chronic 2016    Hyperlipidemia     Hypertension     Iron deficiency anemia 2/3/2016    Myocardial infarction     Obesity     Peripheral vascular disease     Pneumonia     Polyneuropathy     Retinal detachment     OS    Septic shock 2017    Skin ulcer 3/18/2017    Tobacco dependence     Type II 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, not stated as uncontrolled(250.60)        Past Surgical History:   Procedure Laterality Date    BREAST BIOPSY Left 10 yrs ago    benign    CATARACT EXTRACTION Left     OS      SECTION      x2    EYE SURGERY      HYSTERECTOMY      partial    OOPHORECTOMY      one ovary conserved    RETINAL DETACHMENT SURGERY      buckle --OS    TONSILLECTOMY         Review of patient's allergies indicates:   Allergen Reactions    Dilaudid [hydromorphone] Anaphylaxis     Other reaction(s): Anaphylaxis  Other reaction(s): Unknown    Zyvox [linezolid in dextrose 5%] Shortness Of Breath       No current facility-administered medications on file prior to encounter.      Current Outpatient Medications on File Prior to Encounter   Medication Sig    albuterol (ACCUNEB) 0.63 mg/3 mL Nebu INHALE THE CONTENTS OF 1 VIAL VIA NEBULIZER EVERY 6 HOURS AS NEEDED    albuterol (VENTOLIN HFA) 90 mcg/actuation inhaler Inhale 2 puffs into the lungs every 6 (six) hours as needed. Rescue    apixaban (ELIQUIS) 5 mg Tab Take 1 tablet (5 mg total) by mouth 2 (two) times daily.     ascorbic acid, vitamin C, (VITAMIN C) 500 MG tablet Take 1 tablet (500 mg total) by mouth every evening.    aspirin (ECOTRIN) 81 MG EC tablet Take 81 mg by mouth once daily.    atorvastatin (LIPITOR) 20 MG tablet TAKE 1 TABLET EVERY DAY    BREO ELLIPTA 100-25 mcg/dose diskus inhaler INHALE 1 PUFF INTO THE LUNGS ONE TIME DAILY (CONTROLLER)    budesonide (PULMICORT) 0.5 mg/2 mL nebulizer solution INHALE THE CONTENTS OF 1 VIAL VIA NEBULIZER EVERY DAY    clonazePAM (KLONOPIN) 1 MG tablet 1 tab po bid prn (Patient taking differently: Take 1 mg by mouth 2 (two) times daily as needed for Anxiety. 1 tab po bid prn)    furosemide (LASIX) 40 MG tablet TAKE 1 TABLET ONE TIME DAILY  FOR  FLUID  OVERLOAD (Patient taking differently: TAKE 1 TABLET twice DAILY  FOR  FLUID  OVERLOAD)    gabapentin (NEURONTIN) 800 MG tablet TAKE ONE TABLET BY MOUTH THREE TIMES DAILY    HYDROcodone-acetaminophen (NORCO)  mg per tablet Take 1 tablet by mouth every 8 (eight) hours as needed for Pain (Do not take more than 3 a day. Do not mix with other narcotics.).    KLOR-CON M20 20 mEq tablet TAKE ONE TABLET BY MOUTH ONCE DAILY    levalbuterol (XOPENEX) 0.63 mg/3 mL nebulizer solution INHALE THE CONTENTS OF 1 VIAL BY NEBULIZATION 4 times  DAILY.    DX: J43.9    lisinopril (PRINIVIL,ZESTRIL) 20 MG tablet TAKE 1 TABLET EVERY DAY    metFORMIN (GLUCOPHAGE) 500 MG tablet Take 1 tablet (500 mg total) by mouth 2 (two) times daily with meals.    metoprolol succinate (TOPROL-XL) 25 MG 24 hr tablet TAKE 1/2 TABLET EVERY DAY    multivitamin (THERAGRAN) tablet Take 1 tablet by mouth once daily.    nystatin-triamcinolone (MYCOLOG II) cream APPLY SPARINGLY TO AFFECTED AREA(S) 3 TIMES DAILY AS NEEDED    NYSTOP powder APPLY  POWDER TOPICALLY TO AFFECTED AREA 4 TIMES DAILY    omeprazole (PRILOSEC) 20 MG capsule Take 1 capsule (20 mg total) by mouth once daily.    polyethylene glycol (GLYCOLAX) 17 gram/dose powder     senna-docusate 8.6-50 mg  (PERICOLACE) 8.6-50 mg per tablet Take 1 tablet by mouth 2 (two) times daily as needed for Constipation.    silver sulfADIAZINE 1% (SILVADENE) 1 % cream Apply topically once daily.    spironolactone (ALDACTONE) 25 MG tablet Take 1 tablet (25 mg total) by mouth once daily.    traZODone (DESYREL) 150 MG tablet TAKE 1 TABLET EVERY EVENING.     Family History     Problem Relation (Age of Onset)    Alcohol abuse Mother    Arthritis Father, Sister    Blindness Son    Cancer Brother    Crohn's disease Sister    Early death Sister (30)    Heart disease Father, Sister (32), Sister    No Known Problems Daughter        Tobacco Use    Smoking status: Current Some Day Smoker     Packs/day: 0.25     Years: 50.00     Pack years: 12.50     Types: Cigarettes     Start date: 1/19/1968    Smokeless tobacco: Never Used   Substance and Sexual Activity    Alcohol use: No     Alcohol/week: 0.0 oz     Frequency: Never    Drug use: No    Sexual activity: Not Currently     Review of Systems   Unable to perform ROS: Intubated   Constitutional: Positive for fever.   HENT: Positive for congestion.    Eyes: Negative.    Respiratory: Positive for cough, shortness of breath and wheezing.    Cardiovascular: Negative.    Gastrointestinal: Negative.    Endocrine: Negative.    Genitourinary: Negative.    Musculoskeletal: Negative.    Skin: Negative.    Allergic/Immunologic: Negative.    Neurological: Negative.    Hematological: Negative.    Psychiatric/Behavioral: Negative.      Objective:     Vital Signs (Most Recent):  Temp: 99.3 °F (37.4 °C) (09/23/18 1539)  Pulse: 104 (09/23/18 1603)  Resp: (!) 26 (09/23/18 1409)  BP: (!) 129/57 (09/23/18 1603)  SpO2: 95 % (09/23/18 1603) Vital Signs (24h Range):  Temp:  [99.3 °F (37.4 °C)-102 °F (38.9 °C)] 99.3 °F (37.4 °C)  Pulse:  [104-115] 104  Resp:  [26] 26  SpO2:  [94 %-96 %] 95 %  BP: (129-142)/(57-69) 129/57     Weight: 134.3 kg (296 lb)  Body mass index is 45.01 kg/m².    Physical Exam    Constitutional: She is oriented to person, place, and time. She appears well-developed and well-nourished.   HENT:   Head: Normocephalic and atraumatic.   Eyes: EOM are normal. Pupils are equal, round, and reactive to light.   Neck: Normal range of motion. Neck supple.   Cardiovascular: Normal rate, regular rhythm, normal heart sounds and intact distal pulses.   Pulmonary/Chest: Effort normal. She has wheezes.   Abdominal: Soft. Bowel sounds are normal.   Musculoskeletal: Normal range of motion.   Neurological: She is alert and oriented to person, place, and time.   Skin: Skin is warm and dry.   There are several superficial ulcers on the left pannus with surrounding erythema and tenderness   Psychiatric: She has a normal mood and affect.         CRANIAL NERVES     CN III, IV, VI   Pupils are equal, round, and reactive to light.  Extraocular motions are normal.        Significant Labs:   CBC:   Recent Labs   Lab  09/23/18   1509   WBC  15.80*   HGB  11.8*   HCT  37.8   PLT  261     CMP:   Recent Labs   Lab  09/23/18   1509   NA  140   K  4.7   CL  98   CO2  30*   GLU  95   BUN  13   CREATININE  0.7   CALCIUM  9.7   PROT  7.7   ALBUMIN  3.3*   BILITOT  0.4   ALKPHOS  94   AST  17   ALT  20   ANIONGAP  12   EGFRNONAA  >60       Significant Imaging: I have reviewed all pertinent imaging results/findings within the past 24 hours.

## 2018-09-23 NOTE — CONSULTS
Nelly Tian [id number] is a 67 y.o. female who has been consulted for vancomycin dosing.    The patient has the following labs:     Date Creatinine (mg/dl)    BUN WBC Count   9/23/2018 Estimated Creatinine Clearance: 114.7 mL/min (based on SCr of 0.7 mg/dL). Lab Results   Component Value Date    BUN 13 09/23/2018     Lab Results   Component Value Date    WBC 15.80 (H) 09/23/2018        Current weight is 133.9 kg (295 lb 3.1 oz)      The patient received  2000 mg on 9/23 at 1543.    The patient will be started on vancomycin at a dose of 1250 mg every 12 hours.  The vancomycin trough has been ordered for 9/25 at 0300.  Target trough goal is 10 to 15.    Patient will be followed by pharmacy for changes in renal function, toxicity, and efficacy.   Thank you for allowing us to participate in this patient's care.     Halina Clemons

## 2018-09-23 NOTE — ASSESSMENT & PLAN NOTE
Patient's FSGs are controlled on current hypoglycemics.   Last A1c reviewed-   Lab Results   Component Value Date    HGBA1C 5.6 08/23/2018     Most recent fingerstick glucose reviewed-   Recent Labs   Lab  09/23/18   1417   POCTGLUCOSE  84     Current correctional scale  Low  Maintain anti-hyperglycemic dose as follows-   Antihyperglycemics (From admission, onward)    None

## 2018-09-23 NOTE — ED PROVIDER NOTES
"Encounter Date: 9/23/2018    SCRIBE #1 NOTE: I, Keren Lyons , am scribing for, and in the presence of, Dr. Weinstein .       History     Chief Complaint   Patient presents with    Shortness of Breath       Time seen by provider: 2:18 PM on 09/23/2018    Nelly Tian is a 67 y.o. female with a PMHx of Anxiety; Atrial fibrillation; CHF ; Depression; Diabetes mellitus; Hepatomegaly (2/3/2016) History of MI  (1/19/2016); Hypercapnic respiratory failure, ; Hyperlipidemia; Hypertension; and Peripheral vascular disease, who presents to the ED via EMS with complaints of SOB with associated cough and fever with an onset x 1- 2 days PTA. EMS reports that the patient was 84% on a nasal cannula when they arrived on the scene. They were able to get her up to 94% with a breathing treatment. The patient endorses a productive cough with clear sputum and a fever of 102.9 F. EMS reports the patient told them her SOB feels like " she just ran a marathon and is out of breath." Patient is still smoking, but has cut down to 3 cigarettes a day.They report that the patient has had a persistent tremor since they picked her up, which is abnormal for her. She sees wound care every two weeks for her abdominal pannus infection, but is not currently on antibiotics for it. Family believes that the infection is getting worse, based on the increased erythema . Patient denies any relief. Denies any modifying factors. The patient denies chest pain or any other symptoms at this time. SHx includes Oophorectomy and Hysterectomy.       The history is provided by the patient.     Review of patient's allergies indicates:   Allergen Reactions    Dilaudid [hydromorphone] Anaphylaxis     Other reaction(s): Anaphylaxis  Other reaction(s): Unknown    Zyvox [linezolid in dextrose 5%] Shortness Of Breath     Past Medical History:   Diagnosis Date    *Atrial flutter     Angina pectoris 9/18/2017    Anxiety     Arthritis     Asthma     Atrial " fibrillation     Back pain     Cataract     OD    CHF (congestive heart failure)     COPD (chronic obstructive pulmonary disease)     Depression     Diabetes mellitus     Emphysema of lung     Heart failure     Hepatomegaly 2/3/2016    Hernia     History of MI (myocardial infarction) 2016    Hypercapnic respiratory failure, chronic 2016    Hyperlipidemia     Hypertension     Iron deficiency anemia 2/3/2016    Myocardial infarction     Obesity     Peripheral vascular disease     Pneumonia     Polyneuropathy     Retinal detachment     OS    Septic shock 2017    Skin ulcer 3/18/2017    Tobacco dependence     Type II 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, not stated as uncontrolled(250.60)      Past Surgical History:   Procedure Laterality Date    BREAST BIOPSY Left 10 yrs ago    benign    CATARACT EXTRACTION Left     OS      SECTION      x2    EYE SURGERY      HYSTERECTOMY      partial    OOPHORECTOMY      one ovary conserved    RETINAL DETACHMENT SURGERY      buckle --OS    TONSILLECTOMY       Family History   Problem Relation Age of Onset    Arthritis Father     Heart disease Father     Early death Sister 30        heart disease    Heart disease Sister 32        MI    Cancer Brother         Lung cancer    No Known Problems Daughter     Blindness Son         due to accident//    Arthritis Sister     Heart disease Sister     Crohn's disease Sister     Alcohol abuse Mother     Breast cancer Neg Hx     Glaucoma Neg Hx     Macular degeneration Neg Hx     Retinal detachment Neg Hx     Amblyopia Neg Hx     Diabetes Neg Hx     Cataracts Neg Hx     Hypertension Neg Hx     Strabismus Neg Hx     Stroke Neg Hx     Thyroid disease Neg Hx      Social History     Tobacco Use    Smoking status: Current Some Day Smoker     Packs/day: 0.25     Years: 50.00     Pack years: 12.50     Types: Cigarettes     Start date:  1/19/1968    Smokeless tobacco: Never Used   Substance Use Topics    Alcohol use: No     Alcohol/week: 0.0 oz     Frequency: Never    Drug use: No     Review of Systems   Constitutional: Positive for fever. Negative for activity change, appetite change and chills.   HENT: Negative for congestion, rhinorrhea and sore throat.    Eyes: Negative for redness and visual disturbance.   Respiratory: Positive for cough and shortness of breath. Negative for chest tightness.    Cardiovascular: Negative for chest pain.   Gastrointestinal: Negative for abdominal pain, diarrhea, nausea and vomiting.   Genitourinary: Negative for dysuria and frequency.   Musculoskeletal: Negative for back pain, neck pain and neck stiffness.   Skin: Positive for color change (erythema ). Negative for rash.   Neurological: Positive for tremors. Negative for dizziness, syncope, numbness and headaches.       Physical Exam     Initial Vitals   BP Pulse Resp Temp SpO2   09/23/18 1409 09/23/18 1409 09/23/18 1409 09/23/18 1409 09/23/18 1415   (!) 142/69 (!) 115 (!) 26 99.3 °F (37.4 °C) 95 %      MAP       --                Physical Exam    Nursing note and vitals reviewed.  Constitutional: She appears well-developed.   HENT:   Head: Normocephalic and atraumatic.   Eyes: EOM are normal. Pupils are equal, round, and reactive to light.   Neck: Neck supple.   Cardiovascular: Regular rhythm, normal heart sounds and intact distal pulses. Tachycardia present.  Exam reveals no gallop and no friction rub.    No murmur heard.  Pulmonary/Chest: No respiratory distress. She has decreased breath sounds (subtle ). She has wheezes. She has rhonchi. She has no rales.   Abdominal: Soft. Bowel sounds are normal. She exhibits no distension. There is no tenderness.   Left abdominal pannus is erythematous. There is a bandage on the lateral board of it.    Musculoskeletal: Normal range of motion.   Neurological: She is alert and oriented to person, place, and time. She  displays tremor.   Diffuse tremor of the bilateral upper and lower extremities    Skin: Skin is warm and dry.   Vasculitis of the right lower extremity    Psychiatric: She has a normal mood and affect.         ED Course   Critical Care  Date/Time: 9/23/2018 3:00 PM  Performed by: Geovanny Weinstein MD  Authorized by: Geovanny Weinstein MD   Direct patient critical care time: 35 minutes  Additional history critical care time: 5 minutes  Ordering / reviewing critical care time: 5 minutes  Documentation critical care time: 5 minutes  Consulting other physicians critical care time: 3 minutes  Consult with family critical care time: 5 minutes  Total critical care time (exclusive of procedural time) : 58 minutes  Critical care was necessary to treat or prevent imminent or life-threatening deterioration of the following conditions: sepsis.  Critical care was time spent personally by me on the following activities: discussions with consultants, evaluation of patient's response to treatment, examination of patient, obtaining history from patient or surrogate, ordering and review of laboratory studies, ordering and review of radiographic studies, pulse oximetry and re-evaluation of patient's condition.        Labs Reviewed   CULTURE, BLOOD   CULTURE, BLOOD   URINALYSIS, REFLEX TO URINE CULTURE    Narrative:     Preferred Collection Type->Urine, Catheterized   CBC W/ AUTO DIFFERENTIAL   COMPREHENSIVE METABOLIC PANEL   LACTIC ACID, PLASMA   MAGNESIUM   PHOSPHORUS   APTT   PROTIME-INR   B-TYPE NATRIURETIC PEPTIDE   LIPASE   TROPONIN I   PROCALCITONIN   POCT GLUCOSE     EKG Readings: (Independently Interpreted)   Rhythm: Atrial Fibrillation. Heart Rate: 111. Axis: Right Axis Deviation.   A Fib with RVR   PVC's          Imaging Results    None          Medical Decision Making:   History:   I obtained history from: EMS provider.  Old Medical Records: I decided to obtain old medical records.  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  Hospitalist came to the emergency room saw the patient and placed further admission orders.  She was given broad-spectrum antibiotics fluids and blood cultures were ordered in the emergency department.  Chest x-ray did not show any evidence of pneumonia.  Initial lactic acid was elevated.  This will be repeated.            Scribe Attestation:   Scribe #1: I performed the above scribed service and the documentation accurately describes the services I performed. I attest to the accuracy of the note.               Clinical Impression:   The primary encounter diagnosis was Cellulitis of abdominal wall. A diagnosis of Sepsis was also pertinent to this visit.      Disposition:   Disposition: Admitted           I, Dr. Geovanny Weinstein personally performed the services described in this documentation. All medical record entries made by the scribe were at my direction and in my presence.  I have reviewed the chart and agree that the record reflects my personal performance and is accurate and complete. Geovanny Weinstein MD.  2:39 PM 09/24/2018    DISCLAIMER: This note was prepared with Dragon NaturallySpeaking voice recognition transcription software. Garbled syntax, mangled pronouns, and other bizarre constructions may be attributed to that software system              Geovanny Weinstein MD  09/24/18 [id number]

## 2018-09-23 NOTE — ASSESSMENT & PLAN NOTE
Body mass index is 45.01 kg/m². Morbid obesity complicates all aspects of disease management from diagnostic modalities to treatment. Weight loss encouraged and health benefits explained to patient.

## 2018-09-23 NOTE — HPI
68 y/o female developed fever today and felt awful. She's had a productive cough, SOB and wheezing for about 3 days. Her daughter noticed that her abdominal wall was erythematous. The pt has been dealing with ulcerations in her pannus for months and was hospitalized last month with cellulitis. She receives tx at the wound care clinic. Dr. Tan is her doctor.    She has well controlled DM.  She has Oxygent dependent COPD and continues to smoke about 4 cigs a day.    She denies nausea, vomiting, abdominal pain, diarrhea or dysuria.

## 2018-09-24 LAB
ESTIMATED AVG GLUCOSE: 114 MG/DL
HBA1C MFR BLD HPLC: 5.6 %
POCT GLUCOSE: 123 MG/DL (ref 70–110)
POCT GLUCOSE: 124 MG/DL (ref 70–110)
POCT GLUCOSE: 146 MG/DL (ref 70–110)
POCT GLUCOSE: 150 MG/DL (ref 70–110)
PROCALCITONIN SERPL IA-MCNC: <0.02 NG/ML

## 2018-09-24 PROCEDURE — 94761 N-INVAS EAR/PLS OXIMETRY MLT: CPT

## 2018-09-24 PROCEDURE — 99222 1ST HOSP IP/OBS MODERATE 55: CPT | Mod: ,,, | Performed by: SURGERY

## 2018-09-24 PROCEDURE — 63600175 PHARM REV CODE 636 W HCPCS: Performed by: INTERNAL MEDICINE

## 2018-09-24 PROCEDURE — 97165 OT EVAL LOW COMPLEX 30 MIN: CPT

## 2018-09-24 PROCEDURE — 25500020 PHARM REV CODE 255

## 2018-09-24 PROCEDURE — 25000242 PHARM REV CODE 250 ALT 637 W/ HCPCS: Performed by: INTERNAL MEDICINE

## 2018-09-24 PROCEDURE — 94640 AIRWAY INHALATION TREATMENT: CPT

## 2018-09-24 PROCEDURE — 25000003 PHARM REV CODE 250: Performed by: NURSE PRACTITIONER

## 2018-09-24 PROCEDURE — 97116 GAIT TRAINING THERAPY: CPT

## 2018-09-24 PROCEDURE — G8988 SELF CARE GOAL STATUS: HCPCS | Mod: CK

## 2018-09-24 PROCEDURE — G8987 SELF CARE CURRENT STATUS: HCPCS | Mod: CK

## 2018-09-24 PROCEDURE — G8979 MOBILITY GOAL STATUS: HCPCS | Mod: CJ

## 2018-09-24 PROCEDURE — 25000003 PHARM REV CODE 250: Performed by: INTERNAL MEDICINE

## 2018-09-24 PROCEDURE — 11000001 HC ACUTE MED/SURG PRIVATE ROOM

## 2018-09-24 PROCEDURE — 27000221 HC OXYGEN, UP TO 24 HOURS

## 2018-09-24 PROCEDURE — 97162 PT EVAL MOD COMPLEX 30 MIN: CPT

## 2018-09-24 PROCEDURE — 97535 SELF CARE MNGMENT TRAINING: CPT

## 2018-09-24 PROCEDURE — G8978 MOBILITY CURRENT STATUS: HCPCS | Mod: CK

## 2018-09-24 RX ORDER — MAG HYDROX/ALUMINUM HYD/SIMETH 200-200-20
30 SUSPENSION, ORAL (FINAL DOSE FORM) ORAL
Status: DISCONTINUED | OUTPATIENT
Start: 2018-09-24 | End: 2018-09-26 | Stop reason: HOSPADM

## 2018-09-24 RX ORDER — FLUCONAZOLE 100 MG/1
100 TABLET ORAL DAILY
Status: DISCONTINUED | OUTPATIENT
Start: 2018-09-24 | End: 2018-09-26 | Stop reason: HOSPADM

## 2018-09-24 RX ORDER — SODIUM CHLORIDE 9 MG/ML
INJECTION, SOLUTION INTRAVENOUS
Status: DISPENSED
Start: 2018-09-24 | End: 2018-09-25

## 2018-09-24 RX ORDER — MORPHINE SULFATE 4 MG/ML
2 INJECTION, SOLUTION INTRAMUSCULAR; INTRAVENOUS EVERY 4 HOURS PRN
Status: DISCONTINUED | OUTPATIENT
Start: 2018-09-24 | End: 2018-09-26 | Stop reason: HOSPADM

## 2018-09-24 RX ADMIN — OXYCODONE HYDROCHLORIDE AND ACETAMINOPHEN 500 MG: 500 TABLET ORAL at 09:09

## 2018-09-24 RX ADMIN — IPRATROPIUM BROMIDE 0.5 MG: 0.5 SOLUTION RESPIRATORY (INHALATION) at 04:09

## 2018-09-24 RX ADMIN — LEVALBUTEROL HYDROCHLORIDE 0.63 MG: 0.63 SOLUTION RESPIRATORY (INHALATION) at 04:09

## 2018-09-24 RX ADMIN — TRAZODONE HYDROCHLORIDE 50 MG: 50 TABLET ORAL at 09:09

## 2018-09-24 RX ADMIN — Medication: at 09:09

## 2018-09-24 RX ADMIN — ASPIRIN 81 MG: 81 TABLET, COATED ORAL at 09:09

## 2018-09-24 RX ADMIN — FLUCONAZOLE 100 MG: 100 TABLET ORAL at 02:09

## 2018-09-24 RX ADMIN — ALUMINUM HYDROXIDE, MAGNESIUM HYDROXIDE, SIMETHICONE 30 ML: 400; 400; 40 SUSPENSION ORAL at 11:09

## 2018-09-24 RX ADMIN — NYSTATIN AND TRIAMCINOLONE ACETONIDE: 100000; 1 CREAM TOPICAL at 04:09

## 2018-09-24 RX ADMIN — IPRATROPIUM BROMIDE 0.5 MG: 0.5 SOLUTION RESPIRATORY (INHALATION) at 07:09

## 2018-09-24 RX ADMIN — NYSTATIN AND TRIAMCINOLONE ACETONIDE: 100000; 1 CREAM TOPICAL at 09:09

## 2018-09-24 RX ADMIN — FUROSEMIDE 40 MG: 40 TABLET ORAL at 09:09

## 2018-09-24 RX ADMIN — IOHEXOL 100 ML: 350 INJECTION, SOLUTION INTRAVENOUS at 03:09

## 2018-09-24 RX ADMIN — CLONAZEPAM 1 MG: 1 TABLET ORAL at 09:09

## 2018-09-24 RX ADMIN — CLONAZEPAM 1 MG: 1 TABLET ORAL at 11:09

## 2018-09-24 RX ADMIN — METOPROLOL SUCCINATE 12.5 MG: 25 TABLET, EXTENDED RELEASE ORAL at 09:09

## 2018-09-24 RX ADMIN — ATORVASTATIN CALCIUM 20 MG: 20 TABLET, FILM COATED ORAL at 09:09

## 2018-09-24 RX ADMIN — MORPHINE SULFATE 2 MG: 4 INJECTION, SOLUTION INTRAMUSCULAR; INTRAVENOUS at 03:09

## 2018-09-24 RX ADMIN — LEVALBUTEROL HYDROCHLORIDE 0.63 MG: 0.63 SOLUTION RESPIRATORY (INHALATION) at 07:09

## 2018-09-24 RX ADMIN — GABAPENTIN 800 MG: 400 CAPSULE ORAL at 09:09

## 2018-09-24 RX ADMIN — VANCOMYCIN HYDROCHLORIDE 1250 MG: 1 INJECTION, POWDER, LYOPHILIZED, FOR SOLUTION INTRAVENOUS at 02:09

## 2018-09-24 RX ADMIN — SENNOSIDES AND DOCUSATE SODIUM 1 TABLET: 8.6; 5 TABLET ORAL at 09:09

## 2018-09-24 RX ADMIN — LISINOPRIL 20 MG: 10 TABLET ORAL at 09:09

## 2018-09-24 RX ADMIN — SPIRONOLACTONE 25 MG: 25 TABLET, FILM COATED ORAL at 09:09

## 2018-09-24 RX ADMIN — PANTOPRAZOLE SODIUM 40 MG: 40 TABLET, DELAYED RELEASE ORAL at 09:09

## 2018-09-24 RX ADMIN — POTASSIUM CHLORIDE 20 MEQ: 20 TABLET, EXTENDED RELEASE ORAL at 09:09

## 2018-09-24 RX ADMIN — LEVALBUTEROL HYDROCHLORIDE 0.63 MG: 0.63 SOLUTION RESPIRATORY (INHALATION) at 11:09

## 2018-09-24 RX ADMIN — VANCOMYCIN HYDROCHLORIDE 1250 MG: 1 INJECTION, POWDER, LYOPHILIZED, FOR SOLUTION INTRAVENOUS at 04:09

## 2018-09-24 RX ADMIN — ALUMINUM HYDROXIDE, MAGNESIUM HYDROXIDE, SIMETHICONE 30 ML: 400; 400; 40 SUSPENSION ORAL at 05:09

## 2018-09-24 RX ADMIN — HYDROCODONE BITARTRATE AND ACETAMINOPHEN 1 TABLET: 10; 325 TABLET ORAL at 05:09

## 2018-09-24 RX ADMIN — IPRATROPIUM BROMIDE 0.5 MG: 0.5 SOLUTION RESPIRATORY (INHALATION) at 11:09

## 2018-09-24 RX ADMIN — BUDESONIDE 0.5 MG: 0.5 INHALANT RESPIRATORY (INHALATION) at 07:09

## 2018-09-24 RX ADMIN — HYDROCODONE BITARTRATE AND ACETAMINOPHEN 1 TABLET: 10; 325 TABLET ORAL at 11:09

## 2018-09-24 RX ADMIN — HYDROCODONE BITARTRATE AND ACETAMINOPHEN 1 TABLET: 10; 325 TABLET ORAL at 09:09

## 2018-09-24 RX ADMIN — GABAPENTIN 800 MG: 400 CAPSULE ORAL at 03:09

## 2018-09-24 NOTE — PLAN OF CARE
PCP is Dr Bird.  Verified insurance as [employer] medicare and medicaid.  Pharmacy is [facility].  Active with [facility].  Patient has home oxygen that she uses at [facility] ([place]); patient has a concentrator and portable concentrator.  Discharge plan is home with HH.       09/24/18 1602   Discharge Assessment   Assessment Type Discharge Planning Assessment   Confirmed/corrected address and phone number on facesheet? Yes   Assessment information obtained from? Patient   Prior to hospitilization cognitive status: Alert/Oriented   Prior to hospitalization functional status: Independent;Assistive Equipment   Current cognitive status: Alert/Oriented   Current Functional Status: Assistive Equipment;Needs Assistance   Lives With child(bonny), adult  (daughter)   Able to Return to Prior Arrangements yes   Is patient able to care for self after discharge? Yes   Patient's perception of discharge disposition home health   Readmission Within The Last 30 Days unable to assess   Patient currently being followed by outpatient case management? Yes   If yes, name of outpatient case management following: [facility] outpatient case management   Patient currently receives any other outside agency services? Yes   Name and contact number of agency or person providing outside services [facility]   Is it the patient/care giver preference to resume care with the current outside agency? Yes   Equipment Currently Used at Home cane, straight;rollator;nebulizer;oxygen  (o2 at [facility])   Do you have any problems affording any of your prescribed medications? No   Is the patient taking medications as prescribed? yes   Does the patient have transportation home? Yes   Transportation Available family or friend will provide   Dialysis Name and Scheduled days none   Does the patient receive services at the Coumadin Clinic? No   Discharge Plan A Home Health   Patient/Family In Agreement With Plan yes

## 2018-09-24 NOTE — PT/OT/SLP EVAL
Occupational Therapy   Evaluation    Name: Nelly Tian  MRN: [id number]  Admitting Diagnosis:  Cellulitis of trunk      Recommendations:     Discharge Recommendations: home with home health  Discharge Equipment Recommendations:  none  Barriers to discharge:  None    History:     Occupational Profile:  Living Environment: Pt lives with her daughter, her daughter's fiance and her 2 granddaughters in a mobile home with 5 SARAH and B HR.  Previous level of function: Pt needed assistance with bathing and LB dressing but was Mod I with UB ADL's and independent with self feeding. She used a rollator for mobility and could only walk short distances. Her daughter does all household tasks and drives pt to the store and appointments.  Roles and Routines: Pt enjoys TV, coloring and family time.  Equipment Used at Home:  rollator, cane, straight, 3-in-1 commode, bath bench  Assistance upon Discharge: Pt stated she has 24/7 assistance available at home.    Past Medical History:   Diagnosis Date    *Atrial flutter     Angina pectoris 9/18/2017    Anxiety     Arthritis     Asthma     Atrial fibrillation     Back pain     Cataract     OD    CHF (congestive heart failure)     COPD (chronic obstructive pulmonary disease)     Depression     Diabetes mellitus     Emphysema of lung     Heart failure     Hepatomegaly 2/3/2016    Hernia     History of MI (myocardial infarction) 1/19/2016    Hypercapnic respiratory failure, chronic 11/16/2016    Hyperlipidemia     Hypertension     Iron deficiency anemia 2/3/2016    Myocardial infarction     Obesity     Peripheral vascular disease     Pneumonia     Polyneuropathy     Retinal detachment     OS    Septic shock 4/23/2017    Skin ulcer 3/18/2017    Tobacco dependence     Type II 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, not stated as uncontrolled(250.60)        Past Surgical History:   Procedure Laterality Date    BREAST BIOPSY Left  10 yrs ago    benign    CATARACT EXTRACTION Left     OS      SECTION      x2    EYE SURGERY      HYSTERECTOMY      partial    OOPHORECTOMY      one ovary conserved    RETINAL DETACHMENT SURGERY      buckle --OS    TONSILLECTOMY         Subjective     Chief Complaint: L side trunk pain  Patient/Family Comments/goals: To get better    Pain/Comfort:  · Pain Rating 1: 5/10  · Location - Side 1: Left  · Location - Orientation 1: generalized  · Location 1: (trunk)  · Pain Addressed 1: Nurse notified, Cessation of Activity  · Pain Rating Post-Intervention 1: 510    Patients cultural, spiritual, Methodist conflicts given the current situation:      Objective:     Communicated with: nurse Tanya prior to session.  Patient found in bed and oxygen upon OT entry to room.    General Precautions: Standard, fall(skin)   Orthopedic Precautions:N/A   Braces: N/A     Occupational Performance:    Bed Mobility:    · Patient completed Rolling/Turning to Right with modified independence  · Patient completed Scooting/Bridging with modified independence  · Patient completed Supine to Sit with modified independence  · Patient completed Sit to Supine with modified independence    Functional Mobility/Transfers:  · Patient completed Sit <> Stand Transfer with supervision  with  side rail   · Patient completed Toilet Transfer Stand Pivot technique with stand by assistance with  bedside commode      Activities of Daily Living:  · Feeding:  independence    · Grooming: set-up at EOB    · Toileting: stand by assistance at C     Cognitive/Visual Perceptual:  Cognitive/Psychosocial Skills:     -       Oriented to: Person, Place, Time and Situation   -       Follows Commands/attention:Follows multistep  commands  -       Communication: clear/fluent  -       Safety awareness/insight to disability: intact   -       Mood/Affect/Coping skills/emotional control: Appropriate to situation, Cooperative and Pleasant  Visual/Perceptual:       -Intact      Physical Exam:  Postural examination/scapula alignment:    -       Rounded shoulders  -       Forward head  -       Posterior pelvic tilt  Skin integrity: erythema and ulcerations on large area of L trunk; mild erythema at L elbow; scars on B UE's  Edema:  Mild L elbow  Dominant hand:    -       right  Upper Extremity Range of Motion:     -       Right Upper Extremity: WFL    -       Left Upper Extremity: WFL      Upper Extremity Strength:    -       Right Upper Extremity: WFL  -       Left Upper Extremity: WFL     Strength:    -       Right Upper Extremity: WFL    -       Left Upper Extremity: WFL     Fine Motor Coordination:    -       Intact  Gross motor coordination:   WFL    AMPAC 6 Click ADL:  AMPAC Total Score: 19    Treatment & Education:  OT ed pt on OT role & POC as well as discharge recommendations.  Pt noted to be mildly SOB after toileting and transfer. OT initiated education with patient on energy conservation techniques to reduce demand on cardiovascular system with performance of ADL tasks, focusing on activity pacing, environmental modifications, work simplification & use of adaptive equipment to reduce fatigue & SOB.     Education:    Patient left HOB elevated with all lines intact, call button in reach and Tanya nurse notified    Assessment:     Nelly Tian is a 67 y.o. female with a medical diagnosis of Cellulitis of trunk. Pt displays deficits in functional status due to the below listed impairments, impacting ADLs and functional mobility. Pt is limited by her pain, impaired activity tolerance and generalized weakness.  She presents with the following performance deficits affecting function: weakness, impaired self care skills, impaired skin, pain, impaired functional mobilty, impaired endurance, edema.       Rehab Prognosis: Good; patient would benefit from acute skilled OT services to address these deficits and reach maximum level of function.         Clinical  "Decision Makin.  OT Low:  "Pt evaluation falls under low complexity for evaluation coding due to performance deficits noted in 1-3 areas as stated above and 0 co-morbities affecting current functional status. Data obtained from problem focused assessments. No modifications or assistance was required for completion of evaluation. Only brief occupational profile and history review completed."     Plan:     Patient to be seen 2 x/week to address the above listed problems via self-care/home management, therapeutic activities, therapeutic exercises  · Plan of Care Expires: 10/24/18  · Plan of Care Reviewed with: patient    This Plan of care has been discussed with the patient who was involved in its development and understands and is in agreement with the identified goals and treatment plan    GOALS:   Multidisciplinary Problems     Occupational Therapy Goals        Problem: Occupational Therapy Goal    Goal Priority Disciplines Outcome Interventions   Occupational Therapy Goal     OT, PT/OT Ongoing (interventions implemented as appropriate)    Description:  Goals to be met by: 10/1/18     Patient will increase functional independence with ADLs by performing:    UE Dressing with Set-up Assistance.  Grooming while seated at sink with Assistive Devices as needed.  Toileting from toilet with Supervision and Assistive Devices as needed for hygiene and clothing management.   Toilet transfer to toilet with Supervision.  Upper extremity exercise program x10 reps per handout, with supervision.                      Time Tracking:     OT Date of Treatment: 18  OT Start Time: 1036  OT Stop Time: 1055  OT Total Time (min): 19 min    Billable Minutes:Evaluation 8  Self Care/Home Management 11    MARIA L Galo  2018    "

## 2018-09-24 NOTE — PLAN OF CARE
09/24/18 1458   Readmission Questionnaire   At the time of your discharge, did someone talk to you about what your health problems were? Yes   At the time of discharge, did someone talk to you about what to watch out for regarding worsening of your health problem? Yes   At the time of discharge, did someone talk to you about what to do if you experienced worsening of your health problem? Yes   At the time of discharge, did someone talk to you about which medication to take when you left the hospital and which ones to stop taking? Yes   At the time of discharge, did someone talk to you about when and where to follow up with a doctor after you left the hospital? Yes   What do you believe caused you to be sick enough to be re-admitted? short of breath   How often do you need to have someone help you when you read instructions, pamphlets, or other written material from your doctor or pharmacy? Sometimes   Do you have problems taking your medications as prescribed? No   Do you have any problems affording any of  your prescribed medications? No   Do you have problems obtaining/receiving your medications? No   Does the patient have transportation to healthcare appointments? Yes   Lives With child(bonny), adult   Does the patient have family/friends to help with healtcare needs after discharge? yes   Does your caregiver provide all the help you need? Yes   Are you currently feeling confused? No   Are you currently having problems thinking? No   Are you currently having memory problems? No   Have you felt down, depressed, or hopeless? 0   In the last 7 days, my sleep quality was: fair

## 2018-09-24 NOTE — PLAN OF CARE
Chronic wounds left abdominal panis; surrounding erythema and pain today.  States Dr Wilson has seen wound and ordered CT scan.  Wound orders written.

## 2018-09-24 NOTE — PT/OT/SLP EVAL
Physical Therapy Evaluation    Patient Name:  Nelly Tian   MRN:  [id number]    Recommendations:     Discharge Recommendations:  home health PT   Discharge Equipment Recommendations: none   Barriers to discharge: None    Assessment:     Nelly Tian is a 67 y.o. female admitted with a medical diagnosis of Cellulitis of trunk.  She presents with the following impairments/functional limitations:  weakness, impaired self care skills, decreased safety awareness, impaired skin, impaired endurance, impaired functional mobilty, pain, edema, impaired cardiopulmonary response to activity. Pt is alert, in good spirits, and motivated for therapy. Pt presents with protruberant abdominal area assymetry with more on L side, with tenderness around Left abdominal wound. Pt is a bit  Impulsive requiring VC's for safety during ambulation. This is pt's second hospitalization in a month. Pt to benefit from continued therapies.     Rehab Prognosis:  fair; patient would benefit from acute skilled PT services to address these deficits and reach maximum level of function.      Recent Surgery: Procedure(s) (LRB):  DEBRIDEMENT, WOUND, ABDOMEN (Left)      Plan:     During this hospitalization, patient to be seen 6 x/week to address the above listed problems via gait training, therapeutic activities, therapeutic exercises  · Plan of Care Expires:  10/26/18   Plan of Care Reviewed with: patient    Subjective     Communicated with nurse Martínez prior to session.  Patient found supine upon PT entry to room, agreeable to evaluation. Pt indicated she had gotten up by herself to use the BSC. Pt expressed concern about this being her second hospitalization in a month.     Chief Complaint: pain  Patient comments/goals: 5/10  Pain/Comfort:  · Pain Rating 1: 5/10  · Location - Side 1: Left  · Location - Orientation 1: generalized  · Location 1: abdomen  · Pain Addressed 1: Reposition, Distraction, Pre-medicate for activity    Patients  cultural, spiritual, Rastafarian conflicts given the current situation:      Living Environment:  Pt lives at home with daughter  Prior to admission, patients level of function was ambulatory for household distance.  Patient has the following equipment: none.  DME owned (not currently used): none.  Upon discharge, patient will have assistance from daughter.    Objective:     Patient found with: oxygen, peripheral IV     General Precautions: Standard, fall   Orthopedic Precautions:N/A   Braces: N/A     Exams:  · Postural Exam:  Patient presented with the following abnormalities: -       Rounded shoulders  · -       Forward head  · -       Lateral weight shift of hips  · RLE ROM: WFL  · RLE Strength: 4/5  · LLE ROM: WFL  · LLE Strength: 4/5    Functional Mobility:  · Bed Mobility:     · Rolling Left:  contact guard assistance  · Scooting: minimum assistance  · Supine to Sit: minimum assistance  · Sit to Supine: minimum assistance  · Transfers:     · Sit to Stand:  minimum assistance and of 2 persons with hand-held assist  · Gait: 70 ft with HHA x2 persons, slightly unsteady gait, requiring VC's for safety    AM-PAC 6 CLICK MOBILITY  Total Score:17       Therapeutic Activities and Exercises:   pt performed seated AP, LAQ, marches  Pt encouraged to continue exercises throughout the day  Pt reminded for Left abdominal wound safety    Patient left sitting EOB with all lines intact, call button in reach and nurse Tanya notified.    GOALS:   Multidisciplinary Problems     Physical Therapy Goals        Problem: Physical Therapy Goal    Goal Priority Disciplines Outcome Goal Variances Interventions   Physical Therapy Goal     PT, PT/OT Ongoing (interventions implemented as appropriate)     Description:  Goals to be met by: 10-     Patient will increase functional independence with mobility by performin. Supine to sit with Stand-by Assistance  2. Sit to stand transfer with Contact Guard Assistance  3. Gait  x  250 feet with Contact Guard Assistance using HHA.   4. Lower extremity exercise program x20 reps per handout, with supervision                      History:     Past Medical History:   Diagnosis Date    *Atrial flutter     Angina pectoris 2017    Anxiety     Arthritis     Asthma     Atrial fibrillation     Back pain     Cataract     OD    CHF (congestive heart failure)     COPD (chronic obstructive pulmonary disease)     Depression     Diabetes mellitus     Emphysema of lung     Heart failure     Hepatomegaly 2/3/2016    Hernia     History of MI (myocardial infarction) 2016    Hypercapnic respiratory failure, chronic 2016    Hyperlipidemia     Hypertension     Iron deficiency anemia 2/3/2016    Myocardial infarction     Obesity     Peripheral vascular disease     Pneumonia     Polyneuropathy     Retinal detachment     OS    Septic shock 2017    Skin ulcer 3/18/2017    Tobacco dependence     Type II 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, not stated as uncontrolled(250.60)        Past Surgical History:   Procedure Laterality Date    BREAST BIOPSY Left 10 yrs ago    benign    CATARACT EXTRACTION Left     OS      SECTION      x2    EYE SURGERY      HYSTERECTOMY      partial    OOPHORECTOMY      one ovary conserved    RETINAL DETACHMENT SURGERY      buckle --OS    TONSILLECTOMY         Clinical Decision Making:     History  Co-morbidities and personal factors that may impact the plan of care Examination  Body Structures and Functions, activity limitations and participation restrictions that may impact the plan of care Clinical Presentation   Decision Making/ Complexity Score   Co-morbidities:   [] Time since onset of injury / illness / exacerbation  [] Status of current condition  []Patient's cognitive status and safety concerns    [] Multiple Medical Problems (see med hx)  Personal Factors:   [] Patient's age  [] Prior  Level of function   [] Patient's home situation (environment and family support)  [] Patient's level of motivation  [] Expected progression of patient      HISTORY:(criteria)    [] 27135 - no personal factors/history    [] 58054 - has 1-2 personal factor/comorbidity     [] 03419 - has >3 personal factor/comorbidity     Body Regions:  [] Objective examination findings  [] Head     []  Neck  [] Trunk   [] Upper Extremity  [] Lower Extremity    Body Systems:  [] For communication ability, affect, cognition, language, and learning style: the assessment of the ability to make needs known, consciousness, orientation (person, place, and time), expected emotional /behavioral responses, and learning preferences (eg, learning barriers, education  needs)  [] For the neuromuscular system: a general assessment of gross coordinated movement (eg, balance, gait, locomotion, transfers, and transitions) and motor function  (motor control and motor learning)  [] For the musculoskeletal system: the assessment of gross symmetry, gross range of motion, gross strength, height, and weight  [] For the integumentary system: the assessment of pliability(texture), presence of scar formation, skin color, and skin integrity  [] For cardiovascular/pulmonary system: the assessment of heart rate, respiratory rate, blood pressure, and edema     Activity limitations:    [] Patient's cognitive status and saf ety concerns          [] Status of current condition      [] Weight bearing restriction  [] Cardiopulmunary Restriction    Participation Restrictions:   [] Goals and goal agreement with the patient     [] Rehab potential (prognosis) and probable outcome      Examination of Body System: (criteria)    [] 94270 - addressing 1-2 elements    [] 88555 - addressing a total of 3 or more elements     [] 84286 -  Addressing a total of 4 or more elements         Clinical Presentation: (criteria)  Choose one     On examination of body system using  standardized tests and measures patient presents with (CHOOSE ONE) elements from any of the following: body structures and functions, activity limitations, and/or participation restrictions.  Leading to a clinical presentation that is considered (CHOOSE ONE)                              Clinical Decision Making  (Eval Complexity):  Choose One     Time Tracking:     PT Received On: 09/24/18  PT Start Time: 1001     PT Stop Time: 1019  PT Total Time (min): 18 min     Billable Minutes: Evaluation 8 and Gait Training 10      Cyndy Banda, PT  09/24/2018

## 2018-09-24 NOTE — PROGRESS NOTES
09/23/18 2014   Patient Assessment/Suction   Level of Consciousness (AVPU) alert   Respiratory Effort Normal;Unlabored   All Lung Fields Breath Sounds coarse;diminished   PONCHO Breath Sounds coarse   LLL Breath Sounds diminished   RUL Breath Sounds coarse   RML Breath Sounds diminished   RLL Breath Sounds diminished   Cough Frequency frequent   Cough Type good;loose   PRE-TX-O2-ETCO2   O2 Device (Oxygen Therapy) nasal cannula   Flow (L/min) 3   Oxygen Concentration (%) 32   SpO2 (!) 94 %   Pulse Oximetry Type Intermittent   Pulse 92   Resp 20   Aerosol Therapy   $ Aerosol Therapy Charges Aerosol Treatment  (pulmicort)   Respiratory Treatment Status given   SVN/Inhaler Treatment Route mask   Position During Treatment HOB at 45 degrees   Patient Tolerance good   Post-Treatment   Post-treatment Heart Rate (beats/min) 95   Post-treatment Resp Rate (breaths/min) 19   All Fields Breath Sounds unchanged   Ready to Wean/Extubation Screen   FIO2<=50 (chart decimal) 0.32

## 2018-09-24 NOTE — PLAN OF CARE
Problem: Patient Care Overview  Goal: Plan of Care Review  Outcome: Ongoing (interventions implemented as appropriate)  Pt on 3L NC with 98% sats and Q8 xopenex/atrovnet treatments and Q12 Pulmicort treatments.

## 2018-09-24 NOTE — NURSING
Cleaned wound using Hibiclens and Nystatin, as ordered.  Tolerated well.  Dressed using border dressing.

## 2018-09-24 NOTE — PLAN OF CARE
Problem: Patient Care Overview  Goal: Plan of Care Review  Outcome: Ongoing (interventions implemented as appropriate)  Fall and safety precautions maintained.  Patient alert and oriented, resting in bed.  Denies SOB. VSS, Pt in NAD.  Plan of care reviewed with patient. Verbalizes understanding.  Call light in reach, bed in low position and wheels locked. Will continue to monitor.  PRN medication given as ordered.

## 2018-09-24 NOTE — PLAN OF CARE
Problem: Physical Therapy Goal  Goal: Physical Therapy Goal  Goals to be met by: 10-     Patient will increase functional independence with mobility by performin. Supine to sit with Stand-by Assistance  2. Sit to stand transfer with Contact Guard Assistance  3. Gait  x 250 feet with Contact Guard Assistance using HHA.   4. Lower extremity exercise program x20 reps per handout, with supervision    Outcome: Ongoing (interventions implemented as appropriate)  PT eval and treat completed, pt performed supine to sit, sit to stand with HHA, ambulated to hallway with HHA, performed stand to sit, performed seated LE exercises.

## 2018-09-24 NOTE — PLAN OF CARE
Problem: Occupational Therapy Goal  Goal: Occupational Therapy Goal  Goals to be met by: 10/1/18     Patient will increase functional independence with ADLs by performing:    UE Dressing with Set-up Assistance.  Grooming while seated at sink with Assistive Devices as needed.  Toileting from toilet with Supervision and Assistive Devices as needed for hygiene and clothing management.   Toilet transfer to toilet with Supervision.  Upper extremity exercise program x10 reps per handout, with supervision.    Outcome: Ongoing (interventions implemented as appropriate)  OT evaluation completed today. Goals & care plan established.    MARIA L Taylor  9/24/2018

## 2018-09-24 NOTE — CONSULTS
"[facility]  General Surgery  Consult Note    Patient Name: Nelly Tian  MRN: [id number]  Code Status: DNR  Admission Date: 9/23/2018  Hospital Length of Stay: 1 days  Attending Physician: Louisa Menard MD  Primary Care Provider: Constantine Bird MD    Patient information was obtained from patient and ER records.     Inpatient consult to General Surgery  Consult performed by: Sadi Wilson MD  Consult ordered by: Louisa Menard MD        Subjective:     Principal Problem: Cellulitis of trunk    History of Present Illness:    Nelly Tian is a 67 y.o. female with a PMHx of Anxiety; Atrial fibrillation; CHF ; Depression; Diabetes mellitus; Hepatomegaly (2/3/2016) History of MI  (1/19/2016); Hypercapnic respiratory failure, ; Hyperlipidemia; Hypertension; and Peripheral vascular disease, who presents to the ED via EMS with complaints of SOB with associated cough and fever with an onset x 1- 2 days PTA. EMS reports that the patient was 84% on a nasal cannula when they arrived on the scene. They were able to get her up to 94% with a breathing treatment. The patient endorses a productive cough with clear sputum and a fever of 102.9 F. EMS reports the patient told them her SOB feels like " she just ran a marathon and is out of breath." Patient is still smoking, but has cut down to 3 cigarettes a day.They report that the patient has had a persistent tremor since they picked her up, which is abnormal for her. She sees wound care at [facility] every two weeks for her abdominal pannus infection, but is not currently on antibiotics for it. Family believes that the infection is getting worse, based on the increased erythema . Patient denies any relief. Denies any modifying factors. The patient denies chest pain or any other symptoms at this time. SHx includes Oophorectomy and Hysterectomy.     On Eliquis for AF.     No current facility-administered medications on file prior to encounter.      Current " Outpatient Medications on File Prior to Encounter   Medication Sig    albuterol (ACCUNEB) 0.63 mg/3 mL Nebu INHALE THE CONTENTS OF 1 VIAL VIA NEBULIZER EVERY 6 HOURS AS NEEDED    albuterol (VENTOLIN HFA) 90 mcg/actuation inhaler Inhale 2 puffs into the lungs every 6 (six) hours as needed. Rescue    apixaban (ELIQUIS) 5 mg Tab Take 1 tablet (5 mg total) by mouth 2 (two) times daily.    ascorbic acid, vitamin C, (VITAMIN C) 500 MG tablet Take 1 tablet (500 mg total) by mouth every evening.    aspirin (ECOTRIN) 81 MG EC tablet Take 81 mg by mouth once daily.    atorvastatin (LIPITOR) 20 MG tablet TAKE 1 TABLET EVERY DAY    BREO ELLIPTA 100-25 mcg/dose diskus inhaler INHALE 1 PUFF INTO THE LUNGS ONE TIME DAILY (CONTROLLER)    budesonide (PULMICORT) 0.5 mg/2 mL nebulizer solution INHALE THE CONTENTS OF 1 VIAL VIA NEBULIZER EVERY DAY    clonazePAM (KLONOPIN) 1 MG tablet 1 tab po bid prn (Patient taking differently: Take 1 mg by mouth 2 (two) times daily as needed for Anxiety. 1 tab po bid prn)    furosemide (LASIX) 40 MG tablet TAKE 1 TABLET ONE TIME DAILY  FOR  FLUID  OVERLOAD (Patient taking differently: TAKE 1 TABLET twice DAILY  FOR  FLUID  OVERLOAD)    gabapentin (NEURONTIN) 800 MG tablet TAKE ONE TABLET BY MOUTH THREE TIMES DAILY    HYDROcodone-acetaminophen (NORCO)  mg per tablet Take 1 tablet by mouth every 8 (eight) hours as needed for Pain (Do not take more than 3 a day. Do not mix with other narcotics.).    KLOR-CON M20 20 mEq tablet TAKE ONE TABLET BY MOUTH ONCE DAILY    levalbuterol (XOPENEX) 0.63 mg/3 mL nebulizer solution INHALE THE CONTENTS OF 1 VIAL BY NEBULIZATION 4 times  DAILY.    DX: J43.9    lisinopril (PRINIVIL,ZESTRIL) 20 MG tablet TAKE 1 TABLET EVERY DAY    metFORMIN (GLUCOPHAGE) 500 MG tablet Take 1 tablet (500 mg total) by mouth 2 (two) times daily with meals.    metoprolol succinate (TOPROL-XL) 25 MG 24 hr tablet TAKE 1/2 TABLET EVERY DAY    multivitamin (THERAGRAN)  tablet Take 1 tablet by mouth once daily.    nystatin-triamcinolone (MYCOLOG II) cream APPLY SPARINGLY TO AFFECTED AREA(S) 3 TIMES DAILY AS NEEDED    NYSTOP powder APPLY  POWDER TOPICALLY TO AFFECTED AREA 4 TIMES DAILY    omeprazole (PRILOSEC) 20 MG capsule Take 1 capsule (20 mg total) by mouth once daily.    polyethylene glycol (GLYCOLAX) 17 gram/dose powder     senna-docusate 8.6-50 mg (PERICOLACE) 8.6-50 mg per tablet Take 1 tablet by mouth 2 (two) times daily as needed for Constipation.    silver sulfADIAZINE 1% (SILVADENE) 1 % cream Apply topically once daily.    spironolactone (ALDACTONE) 25 MG tablet Take 1 tablet (25 mg total) by mouth once daily.    traZODone (DESYREL) 150 MG tablet TAKE 1 TABLET EVERY EVENING.       Review of patient's allergies indicates:   Allergen Reactions    Dilaudid [hydromorphone] Anaphylaxis     Other reaction(s): Anaphylaxis  Other reaction(s): Unknown    Zyvox [linezolid in dextrose 5%] Shortness Of Breath       Past Medical History:   Diagnosis Date    *Atrial flutter     Angina pectoris 9/18/2017    Anxiety     Arthritis     Asthma     Atrial fibrillation     Back pain     Cataract     OD    CHF (congestive heart failure)     COPD (chronic obstructive pulmonary disease)     Depression     Diabetes mellitus     Emphysema of lung     Heart failure     Hepatomegaly 2/3/2016    Hernia     History of MI (myocardial infarction) 1/19/2016    Hypercapnic respiratory failure, chronic 11/16/2016    Hyperlipidemia     Hypertension     Iron deficiency anemia 2/3/2016    Myocardial infarction     Obesity     Peripheral vascular disease     Pneumonia     Polyneuropathy     Retinal detachment     OS    Septic shock 4/23/2017    Skin ulcer 3/18/2017    Tobacco dependence     Type II 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, not stated as uncontrolled(250.60)      Past Surgical History:   Procedure Laterality Date    BREAST  BIOPSY Left 10 yrs ago    benign    CATARACT EXTRACTION Left     OS      SECTION      x2    EYE SURGERY      HYSTERECTOMY      partial    OOPHORECTOMY      one ovary conserved    RETINAL DETACHMENT SURGERY      buckle --OS    TONSILLECTOMY       Family History     Problem Relation (Age of Onset)    Alcohol abuse Mother    Arthritis Father, Sister    Blindness Son    Cancer Brother    Crohn's disease Sister    Early death Sister (30)    Heart disease Father, Sister (32), Sister    No Known Problems Daughter        Tobacco Use    Smoking status: Current Some Day Smoker     Packs/day: 0.25     Years: 50.00     Pack years: 12.50     Types: Cigarettes     Start date: 1968    Smokeless tobacco: Never Used   Substance and Sexual Activity    Alcohol use: No     Alcohol/week: 0.0 oz     Frequency: Never    Drug use: No    Sexual activity: Not Currently     Review of Systems   Constitutional: Positive for activity change, appetite change, chills and fever. Negative for diaphoresis, fatigue and unexpected weight change.   HENT: Negative for hearing loss, sore throat, trouble swallowing and voice change.    Eyes: Negative for visual disturbance.   Respiratory: Positive for cough and shortness of breath. Negative for wheezing.    Cardiovascular: Negative for chest pain, palpitations and leg swelling.   Gastrointestinal: Positive for abdominal pain. Negative for abdominal distention, anal bleeding, constipation, diarrhea, nausea, rectal pain and vomiting.   Genitourinary: Negative for difficulty urinating, dysuria, flank pain, frequency, hematuria, menstrual problem and urgency.   Musculoskeletal: Negative for arthralgias, back pain, joint swelling, myalgias and neck pain.   Skin: Negative for pallor and rash.   Neurological: Negative for dizziness, syncope, weakness and headaches.   Hematological: Negative for adenopathy. Does not bruise/bleed easily.   Psychiatric/Behavioral: Negative for suicidal  ideas. The patient is not nervous/anxious.      Objective:     Vital Signs (Most Recent):  Temp: 98.1 °F (36.7 °C) (09/24/18 1231)  Pulse: 66 (09/24/18 1231)  Resp: 16 (09/24/18 1231)  BP: (!) 94/42 (09/24/18 1231)  SpO2: (!) 93 % (09/24/18 1231) Vital Signs (24h Range):  Temp:  [96.5 °F (35.8 °C)-100.2 °F (37.9 °C)] 98.1 °F (36.7 °C)  Pulse:  [58-95] 66  Resp:  [16-20] 16  SpO2:  [90 %-98 %] 93 %  BP: ()/(42-56) 94/42     Weight: 133.4 kg (294 lb 1.5 oz)  Body mass index is 44.72 kg/m².    Physical Exam   Constitutional: She is oriented to person, place, and time. She appears well-developed and well-nourished. No distress.   HENT:   Head: Normocephalic and atraumatic.   Right Ear: External ear normal.   Left Ear: External ear normal.   Eyes: Conjunctivae are normal. Pupils are equal, round, and reactive to light. Right eye exhibits no discharge. Left eye exhibits no discharge.   Neck: No tracheal deviation present. No thyromegaly present.   Cardiovascular: Normal rate and regular rhythm.   Pulmonary/Chest: Effort normal. No respiratory distress.   Abdominal: Soft. She exhibits no distension. There is no guarding.       Musculoskeletal: She exhibits no edema or tenderness.   Lymphadenopathy:     She has no cervical adenopathy.   Neurological: She is alert and oriented to person, place, and time. No cranial nerve deficit.   Skin: Skin is warm and dry. No rash noted. She is not diaphoretic. No pallor.   Psychiatric: She has a normal mood and affect. Her behavior is normal. Judgment and thought content normal.   Nursing note and vitals reviewed.      Significant Labs:  CBC:   Recent Labs   Lab  09/23/18   1509   WBC  15.80*   RBC  4.18   HGB  11.8*   HCT  37.8   PLT  261   MCV  90   MCH  28.2   MCHC  31.2*     CMP:   Recent Labs   Lab  09/23/18   1509   GLU  95   CALCIUM  9.7   ALBUMIN  3.3*   PROT  7.7   NA  140   K  4.7   CO2  30*   CL  98   BUN  13   CREATININE  0.7   ALKPHOS  94   ALT  20   AST  17   BILITOT   0.4     Coagulation:   Recent Labs   Lab  09/23/18   1509   LABPROT  10.8   INR  1.1   APTT  27.1       Significant Diagnostics:  I have reviewed all pertinent imaging results/findings within the past 24 hours.    Assessment/Plan:     * Cellulitis of trunk    Currently Eliquis being held  Will plan on debridement 9/26.  Will get CT of abdomen to evaluate for abscess.  Has massive incisional hernia.           VTE Risk Mitigation (From admission, onward)    None          Thank you for your consult. I will follow-up with patient. Please contact us if you have any additional questions.    Sadi Wilson MD  General Surgery  [facility]

## 2018-09-24 NOTE — ASSESSMENT & PLAN NOTE
Currently Eliquis being held  Will plan on debridement 9/26.  Will get CT of abdomen to evaluate for abscess.  Has massive incisional hernia.

## 2018-09-24 NOTE — HPI
"   Nelly Tian is a 67 y.o. female with a PMHx of Anxiety; Atrial fibrillation; CHF ; Depression; Diabetes mellitus; Hepatomegaly (2/3/2016) History of MI  (1/19/2016); Hypercapnic respiratory failure, ; Hyperlipidemia; Hypertension; and Peripheral vascular disease, who presents to the ED via EMS with complaints of SOB with associated cough and fever with an onset x 1- 2 days PTA. EMS reports that the patient was 84% on a nasal cannula when they arrived on the scene. They were able to get her up to 94% with a breathing treatment. The patient endorses a productive cough with clear sputum and a fever of 102.9 F. EMS reports the patient told them her SOB feels like " she just ran a marathon and is out of breath." Patient is still smoking, but has cut down to 3 cigarettes a day.They report that the patient has had a persistent tremor since they picked her up, which is abnormal for her. She sees wound care at [facility] every two weeks for her abdominal pannus infection, but is not currently on antibiotics for it. Family believes that the infection is getting worse, based on the increased erythema . Patient denies any relief. Denies any modifying factors. The patient denies chest pain or any other symptoms at this time. SHx includes Oophorectomy and Hysterectomy.     On Eliquis for AF.   "

## 2018-09-24 NOTE — SUBJECTIVE & OBJECTIVE
No current facility-administered medications on file prior to encounter.      Current Outpatient Medications on File Prior to Encounter   Medication Sig    albuterol (ACCUNEB) 0.63 mg/3 mL Nebu INHALE THE CONTENTS OF 1 VIAL VIA NEBULIZER EVERY 6 HOURS AS NEEDED    albuterol (VENTOLIN HFA) 90 mcg/actuation inhaler Inhale 2 puffs into the lungs every 6 (six) hours as needed. Rescue    apixaban (ELIQUIS) 5 mg Tab Take 1 tablet (5 mg total) by mouth 2 (two) times daily.    ascorbic acid, vitamin C, (VITAMIN C) 500 MG tablet Take 1 tablet (500 mg total) by mouth every evening.    aspirin (ECOTRIN) 81 MG EC tablet Take 81 mg by mouth once daily.    atorvastatin (LIPITOR) 20 MG tablet TAKE 1 TABLET EVERY DAY    BREO ELLIPTA 100-25 mcg/dose diskus inhaler INHALE 1 PUFF INTO THE LUNGS ONE TIME DAILY (CONTROLLER)    budesonide (PULMICORT) 0.5 mg/2 mL nebulizer solution INHALE THE CONTENTS OF 1 VIAL VIA NEBULIZER EVERY DAY    clonazePAM (KLONOPIN) 1 MG tablet 1 tab po bid prn (Patient taking differently: Take 1 mg by mouth 2 (two) times daily as needed for Anxiety. 1 tab po bid prn)    furosemide (LASIX) 40 MG tablet TAKE 1 TABLET ONE TIME DAILY  FOR  FLUID  OVERLOAD (Patient taking differently: TAKE 1 TABLET twice DAILY  FOR  FLUID  OVERLOAD)    gabapentin (NEURONTIN) 800 MG tablet TAKE ONE TABLET BY MOUTH THREE TIMES DAILY    HYDROcodone-acetaminophen (NORCO)  mg per tablet Take 1 tablet by mouth every 8 (eight) hours as needed for Pain (Do not take more than 3 a day. Do not mix with other narcotics.).    KLOR-CON M20 20 mEq tablet TAKE ONE TABLET BY MOUTH ONCE DAILY    levalbuterol (XOPENEX) 0.63 mg/3 mL nebulizer solution INHALE THE CONTENTS OF 1 VIAL BY NEBULIZATION 4 times  DAILY.    DX: J43.9    lisinopril (PRINIVIL,ZESTRIL) 20 MG tablet TAKE 1 TABLET EVERY DAY    metFORMIN (GLUCOPHAGE) 500 MG tablet Take 1 tablet (500 mg total) by mouth 2 (two) times daily with meals.    metoprolol succinate  (TOPROL-XL) 25 MG 24 hr tablet TAKE 1/2 TABLET EVERY DAY    multivitamin (THERAGRAN) tablet Take 1 tablet by mouth once daily.    nystatin-triamcinolone (MYCOLOG II) cream APPLY SPARINGLY TO AFFECTED AREA(S) 3 TIMES DAILY AS NEEDED    NYSTOP powder APPLY  POWDER TOPICALLY TO AFFECTED AREA 4 TIMES DAILY    omeprazole (PRILOSEC) 20 MG capsule Take 1 capsule (20 mg total) by mouth once daily.    polyethylene glycol (GLYCOLAX) 17 gram/dose powder     senna-docusate 8.6-50 mg (PERICOLACE) 8.6-50 mg per tablet Take 1 tablet by mouth 2 (two) times daily as needed for Constipation.    silver sulfADIAZINE 1% (SILVADENE) 1 % cream Apply topically once daily.    spironolactone (ALDACTONE) 25 MG tablet Take 1 tablet (25 mg total) by mouth once daily.    traZODone (DESYREL) 150 MG tablet TAKE 1 TABLET EVERY EVENING.       Review of patient's allergies indicates:   Allergen Reactions    Dilaudid [hydromorphone] Anaphylaxis     Other reaction(s): Anaphylaxis  Other reaction(s): Unknown    Zyvox [linezolid in dextrose 5%] Shortness Of Breath       Past Medical History:   Diagnosis Date    *Atrial flutter     Angina pectoris 9/18/2017    Anxiety     Arthritis     Asthma     Atrial fibrillation     Back pain     Cataract     OD    CHF (congestive heart failure)     COPD (chronic obstructive pulmonary disease)     Depression     Diabetes mellitus     Emphysema of lung     Heart failure     Hepatomegaly 2/3/2016    Hernia     History of MI (myocardial infarction) 1/19/2016    Hypercapnic respiratory failure, chronic 11/16/2016    Hyperlipidemia     Hypertension     Iron deficiency anemia 2/3/2016    Myocardial infarction     Obesity     Peripheral vascular disease     Pneumonia     Polyneuropathy     Retinal detachment     OS    Septic shock 4/23/2017    Skin ulcer 3/18/2017    Tobacco dependence     Type II 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, not stated as  uncontrolled(250.60)      Past Surgical History:   Procedure Laterality Date    BREAST BIOPSY Left 10 yrs ago    benign    CATARACT EXTRACTION Left     OS      SECTION      x2    EYE SURGERY      HYSTERECTOMY      partial    OOPHORECTOMY      one ovary conserved    RETINAL DETACHMENT SURGERY      buckle --OS    TONSILLECTOMY       Family History     Problem Relation (Age of Onset)    Alcohol abuse Mother    Arthritis Father, Sister    Blindness Son    Cancer Brother    Crohn's disease Sister    Early death Sister (30)    Heart disease Father, Sister (32), Sister    No Known Problems Daughter        Tobacco Use    Smoking status: Current Some Day Smoker     Packs/day: 0.25     Years: 50.00     Pack years: 12.50     Types: Cigarettes     Start date: 1968    Smokeless tobacco: Never Used   Substance and Sexual Activity    Alcohol use: No     Alcohol/week: 0.0 oz     Frequency: Never    Drug use: No    Sexual activity: Not Currently     Review of Systems   Constitutional: Positive for activity change, appetite change, chills and fever. Negative for diaphoresis, fatigue and unexpected weight change.   HENT: Negative for hearing loss, sore throat, trouble swallowing and voice change.    Eyes: Negative for visual disturbance.   Respiratory: Positive for cough and shortness of breath. Negative for wheezing.    Cardiovascular: Negative for chest pain, palpitations and leg swelling.   Gastrointestinal: Positive for abdominal pain. Negative for abdominal distention, anal bleeding, constipation, diarrhea, nausea, rectal pain and vomiting.   Genitourinary: Negative for difficulty urinating, dysuria, flank pain, frequency, hematuria, menstrual problem and urgency.   Musculoskeletal: Negative for arthralgias, back pain, joint swelling, myalgias and neck pain.   Skin: Negative for pallor and rash.   Neurological: Negative for dizziness, syncope, weakness and headaches.   Hematological: Negative for  adenopathy. Does not bruise/bleed easily.   Psychiatric/Behavioral: Negative for suicidal ideas. The patient is not nervous/anxious.      Objective:     Vital Signs (Most Recent):  Temp: 98.1 °F (36.7 °C) (09/24/18 1231)  Pulse: 66 (09/24/18 1231)  Resp: 16 (09/24/18 1231)  BP: (!) 94/42 (09/24/18 1231)  SpO2: (!) 93 % (09/24/18 1231) Vital Signs (24h Range):  Temp:  [96.5 °F (35.8 °C)-100.2 °F (37.9 °C)] 98.1 °F (36.7 °C)  Pulse:  [58-95] 66  Resp:  [16-20] 16  SpO2:  [90 %-98 %] 93 %  BP: ()/(42-56) 94/42     Weight: 133.4 kg (294 lb 1.5 oz)  Body mass index is 44.72 kg/m².    Physical Exam   Constitutional: She is oriented to person, place, and time. She appears well-developed and well-nourished. No distress.   HENT:   Head: Normocephalic and atraumatic.   Right Ear: External ear normal.   Left Ear: External ear normal.   Eyes: Conjunctivae are normal. Pupils are equal, round, and reactive to light. Right eye exhibits no discharge. Left eye exhibits no discharge.   Neck: No tracheal deviation present. No thyromegaly present.   Cardiovascular: Normal rate and regular rhythm.   Pulmonary/Chest: Effort normal. No respiratory distress.   Abdominal: Soft. She exhibits no distension. There is no guarding.       Musculoskeletal: She exhibits no edema or tenderness.   Lymphadenopathy:     She has no cervical adenopathy.   Neurological: She is alert and oriented to person, place, and time. No cranial nerve deficit.   Skin: Skin is warm and dry. No rash noted. She is not diaphoretic. No pallor.   Psychiatric: She has a normal mood and affect. Her behavior is normal. Judgment and thought content normal.   Nursing note and vitals reviewed.      Significant Labs:  CBC:   Recent Labs   Lab  09/23/18   1509   WBC  15.80*   RBC  4.18   HGB  11.8*   HCT  37.8   PLT  261   MCV  90   MCH  28.2   MCHC  31.2*     CMP:   Recent Labs   Lab  09/23/18   1509   GLU  95   CALCIUM  9.7   ALBUMIN  3.3*   PROT  7.7   NA  140   K  4.7    CO2  30*   CL  98   BUN  13   CREATININE  0.7   ALKPHOS  94   ALT  20   AST  17   BILITOT  0.4     Coagulation:   Recent Labs   Lab  09/23/18   1509   LABPROT  10.8   INR  1.1   APTT  27.1       Significant Diagnostics:  I have reviewed all pertinent imaging results/findings within the past 24 hours.

## 2018-09-25 ENCOUNTER — ANESTHESIA EVENT (OUTPATIENT)
Dept: SURGERY | Facility: HOSPITAL | Age: 68
End: 2018-09-25

## 2018-09-25 ENCOUNTER — OUTPATIENT CASE MANAGEMENT (OUTPATIENT)
Dept: ADMINISTRATIVE | Facility: OTHER | Age: 68
End: 2018-09-25

## 2018-09-25 LAB
ANION GAP SERPL CALC-SCNC: 10 MMOL/L
BUN SERPL-MCNC: 8 MG/DL
CALCIUM SERPL-MCNC: 9.4 MG/DL
CHLORIDE SERPL-SCNC: 101 MMOL/L
CO2 SERPL-SCNC: 30 MMOL/L
CREAT SERPL-MCNC: 0.6 MG/DL
ERYTHROCYTE [DISTWIDTH] IN BLOOD BY AUTOMATED COUNT: 16.1 %
EST. GFR  (AFRICAN AMERICAN): >60 ML/MIN/1.73 M^2
EST. GFR  (NON AFRICAN AMERICAN): >60 ML/MIN/1.73 M^2
GLUCOSE SERPL-MCNC: 108 MG/DL
HCT VFR BLD AUTO: 30.2 %
HGB BLD-MCNC: 9.6 G/DL
MCH RBC QN AUTO: 29 PG
MCHC RBC AUTO-ENTMCNC: 31.9 G/DL
MCV RBC AUTO: 91 FL
PLATELET # BLD AUTO: 244 K/UL
PMV BLD AUTO: 8.8 FL
POCT GLUCOSE: 105 MG/DL (ref 70–110)
POCT GLUCOSE: 125 MG/DL (ref 70–110)
POCT GLUCOSE: 128 MG/DL (ref 70–110)
POCT GLUCOSE: 166 MG/DL (ref 70–110)
POTASSIUM SERPL-SCNC: 4.6 MMOL/L
RBC # BLD AUTO: 3.32 M/UL
SODIUM SERPL-SCNC: 141 MMOL/L
VANCOMYCIN TROUGH SERPL-MCNC: 9.3 UG/ML
WBC # BLD AUTO: 9.2 K/UL

## 2018-09-25 PROCEDURE — 80048 BASIC METABOLIC PNL TOTAL CA: CPT

## 2018-09-25 PROCEDURE — 25000242 PHARM REV CODE 250 ALT 637 W/ HCPCS: Performed by: INTERNAL MEDICINE

## 2018-09-25 PROCEDURE — 80202 ASSAY OF VANCOMYCIN: CPT

## 2018-09-25 PROCEDURE — 94761 N-INVAS EAR/PLS OXIMETRY MLT: CPT

## 2018-09-25 PROCEDURE — 11000001 HC ACUTE MED/SURG PRIVATE ROOM

## 2018-09-25 PROCEDURE — 25000003 PHARM REV CODE 250: Performed by: INTERNAL MEDICINE

## 2018-09-25 PROCEDURE — 94640 AIRWAY INHALATION TREATMENT: CPT

## 2018-09-25 PROCEDURE — 99231 SBSQ HOSP IP/OBS SF/LOW 25: CPT | Mod: ,,, | Performed by: SURGERY

## 2018-09-25 PROCEDURE — 27000221 HC OXYGEN, UP TO 24 HOURS

## 2018-09-25 PROCEDURE — 63600175 PHARM REV CODE 636 W HCPCS: Performed by: INTERNAL MEDICINE

## 2018-09-25 PROCEDURE — 36415 COLL VENOUS BLD VENIPUNCTURE: CPT

## 2018-09-25 PROCEDURE — 85027 COMPLETE CBC AUTOMATED: CPT

## 2018-09-25 RX ORDER — SYRING-NEEDL,DISP,INSUL,0.3 ML 29 G X1/2"
296 SYRINGE, EMPTY DISPOSABLE MISCELLANEOUS ONCE
Status: COMPLETED | OUTPATIENT
Start: 2018-09-25 | End: 2018-09-25

## 2018-09-25 RX ORDER — SODIUM CHLORIDE, SODIUM LACTATE, POTASSIUM CHLORIDE, CALCIUM CHLORIDE 600; 310; 30; 20 MG/100ML; MG/100ML; MG/100ML; MG/100ML
INJECTION, SOLUTION INTRAVENOUS CONTINUOUS
Status: CANCELLED | OUTPATIENT
Start: 2018-09-25

## 2018-09-25 RX ORDER — LIDOCAINE HYDROCHLORIDE 10 MG/ML
1 INJECTION, SOLUTION EPIDURAL; INFILTRATION; INTRACAUDAL; PERINEURAL ONCE
Status: CANCELLED | OUTPATIENT
Start: 2018-09-25 | End: 2018-09-25

## 2018-09-25 RX ADMIN — GABAPENTIN 800 MG: 400 CAPSULE ORAL at 10:09

## 2018-09-25 RX ADMIN — OXYCODONE HYDROCHLORIDE AND ACETAMINOPHEN 500 MG: 500 TABLET ORAL at 08:09

## 2018-09-25 RX ADMIN — LEVALBUTEROL HYDROCHLORIDE 0.63 MG: 0.63 SOLUTION RESPIRATORY (INHALATION) at 07:09

## 2018-09-25 RX ADMIN — FUROSEMIDE 40 MG: 40 TABLET ORAL at 10:09

## 2018-09-25 RX ADMIN — FLUCONAZOLE 100 MG: 100 TABLET ORAL at 10:09

## 2018-09-25 RX ADMIN — Medication: at 08:09

## 2018-09-25 RX ADMIN — BUDESONIDE 0.5 MG: 0.5 INHALANT RESPIRATORY (INHALATION) at 07:09

## 2018-09-25 RX ADMIN — TRAZODONE HYDROCHLORIDE 100 MG: 50 TABLET ORAL at 08:09

## 2018-09-25 RX ADMIN — VANCOMYCIN HYDROCHLORIDE 1500 MG: 1 INJECTION, POWDER, LYOPHILIZED, FOR SOLUTION INTRAVENOUS at 05:09

## 2018-09-25 RX ADMIN — CLONAZEPAM 1 MG: 1 TABLET ORAL at 08:09

## 2018-09-25 RX ADMIN — SENNOSIDES AND DOCUSATE SODIUM 1 TABLET: 8.6; 5 TABLET ORAL at 09:09

## 2018-09-25 RX ADMIN — POTASSIUM CHLORIDE 20 MEQ: 20 TABLET, EXTENDED RELEASE ORAL at 10:09

## 2018-09-25 RX ADMIN — IPRATROPIUM BROMIDE 0.5 MG: 0.5 SOLUTION RESPIRATORY (INHALATION) at 07:09

## 2018-09-25 RX ADMIN — GABAPENTIN 800 MG: 400 CAPSULE ORAL at 08:09

## 2018-09-25 RX ADMIN — PANTOPRAZOLE SODIUM 40 MG: 40 TABLET, DELAYED RELEASE ORAL at 10:09

## 2018-09-25 RX ADMIN — SPIRONOLACTONE 25 MG: 25 TABLET, FILM COATED ORAL at 10:09

## 2018-09-25 RX ADMIN — HYDROCODONE BITARTRATE AND ACETAMINOPHEN 1 TABLET: 10; 325 TABLET ORAL at 06:09

## 2018-09-25 RX ADMIN — HYDROCODONE BITARTRATE AND ACETAMINOPHEN 1 TABLET: 10; 325 TABLET ORAL at 10:09

## 2018-09-25 RX ADMIN — SENNOSIDES AND DOCUSATE SODIUM 1 TABLET: 8.6; 5 TABLET ORAL at 10:09

## 2018-09-25 RX ADMIN — GABAPENTIN 800 MG: 400 CAPSULE ORAL at 02:09

## 2018-09-25 RX ADMIN — ATORVASTATIN CALCIUM 20 MG: 20 TABLET, FILM COATED ORAL at 10:09

## 2018-09-25 RX ADMIN — CLONAZEPAM 1 MG: 1 TABLET ORAL at 10:09

## 2018-09-25 RX ADMIN — MAGESIUM CITRATE 296 ML: 1.75 LIQUID ORAL at 12:09

## 2018-09-25 RX ADMIN — Medication: at 10:09

## 2018-09-25 RX ADMIN — ASPIRIN 81 MG: 81 TABLET, COATED ORAL at 10:09

## 2018-09-25 RX ADMIN — NYSTATIN AND TRIAMCINOLONE ACETONIDE: 100000; 1 CREAM TOPICAL at 08:09

## 2018-09-25 RX ADMIN — NYSTATIN AND TRIAMCINOLONE ACETONIDE: 100000; 1 CREAM TOPICAL at 02:09

## 2018-09-25 RX ADMIN — NYSTATIN AND TRIAMCINOLONE ACETONIDE: 100000; 1 CREAM TOPICAL at 10:09

## 2018-09-25 NOTE — ASSESSMENT & PLAN NOTE
Will cancel OR debridement as any necessary debridement can be done on floor.  Please call as needed.

## 2018-09-25 NOTE — CONSULTS
Nelly Tian [id number] is a 67 y.o. female who has been consulted for vancomycin dosing.    The patient has the following labs:     Date Creatinine (mg/dl)    BUN WBC Count   9/25/2018 Estimated Creatinine Clearance: 134.4 mL/min (based on SCr of 0.6 mg/dL). Lab Results   Component Value Date    BUN 8 09/25/2018     Lab Results   Component Value Date    WBC 9.20 09/25/2018        Current weight is 135 kg (297 lb 9.9 oz)    Pt is receiving vancomycin 1500 mg every 12 hours.  Vancomycin trough has been moved to 09/27 at 0500 due to first dose given about 6 hours late due to loss of IV.  Target trough range is 10-15 mg/dL.    Patient will be followed by pharmacy for changes in renal function, toxicity, and efficacy.  Thank you for allowing us to participate in this patient's care.     Ambrocio Guillen, PharmD

## 2018-09-25 NOTE — PLAN OF CARE
Problem: Patient Care Overview  Goal: Plan of Care Review  Outcome: Ongoing (interventions implemented as appropriate)  Pt remained free from injury/falls this shift. VSS- no signs of any distress. Dressing changed per order-pt tolerated well. Pt c/o pain throughout shift-prn medications given per order. Blood glucose monitored per order. POC reviewed with pt. Pt repositioned independently, skin intact. Bed locked in lowest position, SR upx2, call light within reach. Will continue to monitor.

## 2018-09-25 NOTE — SUBJECTIVE & OBJECTIVE
Interval History: The pt continues to improve. The rash in the abd wall continues to improve and the pain is better controlled    Review of Systems   Respiratory: Negative.    Cardiovascular: Negative.    Gastrointestinal: Negative.    Musculoskeletal: Negative.    Skin: Negative.    Neurological: Negative.    Psychiatric/Behavioral: Negative.      Objective:     Vital Signs (Most Recent):  Temp: 97.5 °F (36.4 °C) (09/25/18 1556)  Pulse: (!) 58 (09/25/18 1556)  Resp: 20 (09/25/18 1556)  BP: (!) 121/57 (09/25/18 1556)  SpO2: 96 % (09/25/18 1556) Vital Signs (24h Range):  Temp:  [96 °F (35.6 °C)-98.5 °F (36.9 °C)] 97.5 °F (36.4 °C)  Pulse:  [58-90] 58  Resp:  [16-20] 20  SpO2:  [90 %-100 %] 96 %  BP: (105-138)/(49-57) 121/57     Weight: 135 kg (297 lb 9.9 oz)  Body mass index is 45.25 kg/m².    Intake/Output Summary (Last 24 hours) at 9/25/2018 1558  Last data filed at 9/25/2018 0600  Gross per 24 hour   Intake 730 ml   Output --   Net 730 ml      Physical Exam   Constitutional: She is oriented to person, place, and time.   Neck: Neck supple.   Cardiovascular: Normal rate, regular rhythm and normal heart sounds.   Pulmonary/Chest: Effort normal and breath sounds normal.   Abdominal: Soft. Bowel sounds are normal.   Large ventral hernia   Neurological: She is alert and oriented to person, place, and time.   Skin: Skin is warm and dry.   Multiple shallow ulcerations with surrounding erythema on the left side of the abd wall.    Psychiatric: She has a normal mood and affect.   Vitals reviewed.      Significant Labs:   BMP:   Recent Labs   Lab  09/25/18   0528   GLU  108   NA  141   K  4.6   CL  101   CO2  30*   BUN  8   CREATININE  0.6   CALCIUM  9.4     CBC:   Recent Labs   Lab  09/25/18   0528   WBC  9.20   HGB  9.6*   HCT  30.2*   PLT  244       Significant Imaging: I have reviewed all pertinent imaging results/findings within the past 24 hours.

## 2018-09-25 NOTE — PROGRESS NOTES
[facility]  Progress Note    Patient Name: Nelly Tian  MRN: [id number]  Patient Class: IP- Inpatient   Admission Date: 9/23/2018  Length of Stay: 2 days  Attending Physician: Louisa Menard MD  Primary Care Provider: Constantine Bird MD        Subjective:     Principal Problem:Cellulitis of trunk    Interval History: The pt is feeling much better. She denies SOB or a cough. She is not having as much pain in the abd wall    Review of Systems   Respiratory: Negative.    Cardiovascular: Negative.    Gastrointestinal: Negative.    Musculoskeletal: Negative.    Skin: Positive for wound.   Neurological: Negative.    Psychiatric/Behavioral: Negative.      Objective:     Vital Signs (Most Recent):  Temp: 98.2 °F (36.8 °C) (09/25/18 1117)  Pulse: 73 (09/25/18 1117)  Resp: 20 (09/25/18 1117)  BP: (!) 138/57 (09/25/18 1117)  SpO2: 99 % (09/25/18 1117) Vital Signs (24h Range):  Temp:  [96 °F (35.6 °C)-98.5 °F (36.9 °C)] 98.2 °F (36.8 °C)  Pulse:  [63-90] 73  Resp:  [16-20] 20  SpO2:  [90 %-100 %] 99 %  BP: (105-138)/(49-57) 138/57     Weight: 135 kg (297 lb 9.9 oz)  Body mass index is 45.25 kg/m².    Intake/Output Summary (Last 24 hours) at 9/25/2018 1506  Last data filed at 9/25/2018 0600  Gross per 24 hour   Intake 730 ml   Output --   Net 730 ml      Physical Exam   Constitutional: She is oriented to person, place, and time.   Neck: Neck supple.   Cardiovascular: Normal rate, regular rhythm and normal heart sounds.   Pulmonary/Chest: Effort normal and breath sounds normal.   Abdominal: Soft. Bowel sounds are normal.   Neurological: She is alert and oriented to person, place, and time.   Skin: Skin is warm and dry.   Multiple superficial ulcers on the left abd wall with surrounding erythema that seems to be improviing   Psychiatric: She has a normal mood and affect.   Vitals reviewed.      Significant Labs:   BMP:   Recent Labs   Lab  09/25/18   0528   GLU  108   NA  141   K  4.6   CL  101   CO2  30*    BUN  8   CREATININE  0.6   CALCIUM  9.4     CBC:   Recent Labs   Lab  09/25/18   0528   WBC  9.20   HGB  9.6*   HCT  30.2*   PLT  244       Significant Imaging: I have reviewed all pertinent imaging results/findings within the past 24 hours.    Assessment/Plan:      Active Diagnoses:    Diagnosis Date Noted POA    PRINCIPAL PROBLEM:  Cellulitis of trunk [L03.319] 08/22/2018 Unknown    Diabetes mellitus with neuropathy [E11.40] 08/13/2012 Yes     Chronic    Chronic atrial fibrillation [I48.2] 11/10/2015 Yes    Hypertension associated with diabetes [E11.59, I10] 01/19/2016 Yes    COPD (chronic obstructive pulmonary disease) [J44.9] 04/05/2017 Yes    Morbid obesity with BMI of 40.0-44.9, adult [E66.01, Z68.41] 11/10/2015 Not Applicable      Problems Resolved During this Admission:     VTE Risk Mitigation (From admission, onward)    None             Louisa Menard MD  [facility]

## 2018-09-25 NOTE — ASSESSMENT & PLAN NOTE
Patient's FSGs are controlled on current hypoglycemics.   Last A1c reviewed-   Lab Results   Component Value Date    HGBA1C 5.6 09/23/2018     Most recent fingerstick glucose reviewed-   Recent Labs   Lab  09/24/18   1614  09/24/18   2136  09/25/18   0547  09/25/18   1044   POCTGLUCOSE  124*  123*  105  128*     Current correctional scale  Low  Maintain anti-hyperglycemic dose as follows-   Antihyperglycemics (From admission, onward)    Start     Stop Route Frequency Ordered    09/23/18 1720  insulin aspart U-100 pen 1-10 Units      -- SubQ Before meals & nightly PRN 09/23/18 1703

## 2018-09-25 NOTE — PROGRESS NOTES
[facility] Hospital Medicine  Progress Note    Patient Name: Nelly Tian  MRN: [id number]  Patient Class: IP- Inpatient   Admission Date: 9/23/2018  Length of Stay: 2 days  Attending Physician: Louisa Menard MD  Primary Care Provider: Constantine Bird MD        Subjective:     Principal Problem:Cellulitis of trunk    HPI:  68 y/o female developed fever today and felt awful. She's had a productive cough, SOB and wheezing for about 3 days. Her daughter noticed that her abdominal wall was erythematous. The pt has been dealing with ulcerations in her pannus for months and was hospitalized last month with cellulitis. She receives tx at the wound care clinic. Dr. Tan is her doctor.    She has well controlled DM.  She has Oxygent dependent COPD and continues to smoke about 4 cigs a day.    She denies nausea, vomiting, abdominal pain, diarrhea or dysuria.    Hospital Course:  No notes on file    Interval History: Pt is feeling better. She has less abd wall pain. No SOB or cough    Review of Systems   Respiratory: Negative.    Cardiovascular: Negative.    Gastrointestinal: Negative.    Musculoskeletal: Negative.    Skin: Negative.    Neurological: Negative.    Psychiatric/Behavioral: Negative.      Objective:     Vital Signs (Most Recent):  Temp: 98.2 °F (36.8 °C) (09/25/18 1117)  Pulse: 73 (09/25/18 1117)  Resp: 20 (09/25/18 1117)  BP: (!) 138/57 (09/25/18 1117)  SpO2: 99 % (09/25/18 1117) Vital Signs (24h Range):  Temp:  [96 °F (35.6 °C)-98.5 °F (36.9 °C)] 98.2 °F (36.8 °C)  Pulse:  [63-90] 73  Resp:  [16-20] 20  SpO2:  [90 %-100 %] 99 %  BP: (105-138)/(49-57) 138/57     Weight: 135 kg (297 lb 9.9 oz)  Body mass index is 45.25 kg/m².    Intake/Output Summary (Last 24 hours) at 9/25/2018 1543  Last data filed at 9/25/2018 0600  Gross per 24 hour   Intake 730 ml   Output --   Net 730 ml      Physical Exam   Constitutional: She is oriented to person, place, and time.   Neck: Neck supple.    Cardiovascular: Normal rate, regular rhythm and normal heart sounds.   Pulmonary/Chest: Effort normal and breath sounds normal.   Abdominal: Soft. Bowel sounds are normal.   Large ventral hernia   Neurological: She is alert and oriented to person, place, and time.   Skin: Skin is warm and dry.   Multiple shallow ulcerations with surrounding erythema on the left side of the abd wall.    Psychiatric: She has a normal mood and affect.   Vitals reviewed.          Assessment/Plan:      * Cellulitis of trunk    The pt is on broad spectrum IV Abx. She was evaluated by sx. They are considering an I&D          Diabetes mellitus with neuropathy    Patient's FSGs are controlled on current hypoglycemics.   Last A1c reviewed-   Lab Results   Component Value Date    HGBA1C 5.6 08/23/2018     Most recent fingerstick glucose reviewed-   Recent Labs   Lab  09/23/18   1417   POCTGLUCOSE  84     Current correctional scale  Low  Maintain anti-hyperglycemic dose as follows-   Antihyperglycemics (From admission, onward)    None                  Chronic atrial fibrillation    I will hold Eliquis in case she needs I&D          Hypertension associated with diabetes    Chronic, controlled.  Monitor BP closely.  Will continue BP medications as needed for sustained BP control.            COPD (chronic obstructive pulmonary disease)    Pt with current exacerbation.  Treat with bronchodilators and steroids          Morbid obesity with BMI of 40.0-44.9, adult    Body mass index is 45.01 kg/m². Morbid obesity complicates all aspects of disease management from diagnostic modalities to treatment. Weight loss encouraged and health benefits explained to patient.              VTE Risk Mitigation (From admission, onward)    None              Louisa Menard MD  Department of Hospital Medicine   [facility]

## 2018-09-25 NOTE — SUBJECTIVE & OBJECTIVE
Interval History: Wound continues to improve.  CT shows no drainable collections.     Medications:  Continuous Infusions:  Scheduled Meds:   aluminum-magnesium hydroxide-simethicone  30 mL Oral QID (AC & HS)    ascorbic acid (vitamin C)  500 mg Oral QHS    aspirin  81 mg Oral Daily    atorvastatin  20 mg Oral Daily    budesonide  0.5 mg Nebulization Q12H    fluconazole  100 mg Oral Daily    furosemide  40 mg Oral Daily    gabapentin  800 mg Oral TID    ipratropium  0.5 mg Nebulization Q8H    levalbuterol  0.63 mg Nebulization Q8H    lisinopril  20 mg Oral Daily    metoprolol succinate  12.5 mg Oral Daily    miconazole NITRATE 2 %   Topical (Top) BID    nystatin-triamcinolone   Topical (Top) TID    pantoprazole  40 mg Oral Daily    potassium chloride SA  20 mEq Oral Daily    senna-docusate 8.6-50 mg  1 tablet Oral BID    spironolactone  25 mg Oral Daily    traZODone  150 mg Oral QHS    vancomycin (VANCOCIN) IVPB  1,500 mg Intravenous Q12H     PRN Meds:acetaminophen, albuterol sulfate, clonazePAM, dextrose 50%, dextrose 50%, glucagon (human recombinant), glucose, glucose, HYDROcodone-acetaminophen, insulin aspart U-100, magnesium oxide, magnesium oxide, morphine, ondansetron, potassium chloride 10%, ramelteon, sodium chloride 0.9%     Review of patient's allergies indicates:   Allergen Reactions    Dilaudid [hydromorphone] Anaphylaxis     Other reaction(s): Anaphylaxis  Other reaction(s): Unknown    Zyvox [linezolid in dextrose 5%] Shortness Of Breath     Objective:     Vital Signs (Most Recent):  Temp: 97.5 °F (36.4 °C) (09/25/18 1556)  Pulse: (!) 58 (09/25/18 1556)  Resp: 20 (09/25/18 1556)  BP: (!) 121/57 (09/25/18 1556)  SpO2: 96 % (09/25/18 1556) Vital Signs (24h Range):  Temp:  [96 °F (35.6 °C)-98.5 °F (36.9 °C)] 97.5 °F (36.4 °C)  Pulse:  [58-90] 58  Resp:  [16-20] 20  SpO2:  [90 %-100 %] 96 %  BP: (105-138)/(49-57) 121/57     Weight: 135 kg (297 lb 9.9 oz)  Body mass index is 45.25  kg/m².    Intake/Output - Last 3 Shifts       09/23 0700 - 09/24 0659 09/24 0700 - 09/25 0659 09/25 0700 - 09/26 0659    P.O. 740 480     IV Piggyback 850 250     Total Intake(mL/kg) 1590 (11.9) 730 (5.4)     Urine (mL/kg/hr) 800      Total Output 800      Net +790 +730            Urine Occurrence  5 x     Stool Occurrence 0 x 0 x           Physical Exam   Constitutional: She is oriented to person, place, and time. She appears well-developed and well-nourished. No distress.   HENT:   Head: Normocephalic and atraumatic.   Right Ear: External ear normal.   Left Ear: External ear normal.   Eyes: Conjunctivae are normal. Pupils are equal, round, and reactive to light. Right eye exhibits no discharge. Left eye exhibits no discharge.   Neck: No tracheal deviation present. No thyromegaly present.   Cardiovascular: Normal rate and regular rhythm.   Pulmonary/Chest: No respiratory distress.   Abdominal: Soft. She exhibits no distension. There is no guarding.   Musculoskeletal: She exhibits no edema or tenderness.   Lymphadenopathy:     She has no cervical adenopathy.   Neurological: She is alert and oriented to person, place, and time. No cranial nerve deficit.   Skin: Skin is warm and dry. No rash noted. She is not diaphoretic. There is erythema (much improved according to nurse). No pallor.   Psychiatric: She has a normal mood and affect. Her behavior is normal. Judgment and thought content normal.   Nursing note and vitals reviewed.      Significant Labs:  CBC:   Recent Labs   Lab  09/25/18   0528   WBC  9.20   RBC  3.32*   HGB  9.6*   HCT  30.2*   PLT  244   MCV  91   MCH  29.0   MCHC  31.9*       Significant Diagnostics:  I have reviewed all pertinent imaging results/findings within the past 24 hours.

## 2018-09-25 NOTE — PT/OT/SLP PROGRESS
"Physical Therapy      Patient Name:  Nelly Tian   MRN:  [id number]    Patient not seen today secondary to patient upset at waiting for she states over an hour for her nurse to come back with bandages to cover her wounds and to start her IV for antibiotics. It they are busy they should just tell her and she would not be so mad but to not say any thing when she presses her call light but "I'll let your nurse know just makes her mad."  . Will follow-up tomorrow.    Ca Elizabeth, PTA    "

## 2018-09-25 NOTE — PROGRESS NOTES
"[facility] Surgery  Progress Note    Subjective:     History of Present Illness:     Nelly Tian is a 67 y.o. female with a PMHx of Anxiety; Atrial fibrillation; CHF ; Depression; Diabetes mellitus; Hepatomegaly (2/3/2016) History of MI  (1/19/2016); Hypercapnic respiratory failure, ; Hyperlipidemia; Hypertension; and Peripheral vascular disease, who presents to the ED via EMS with complaints of SOB with associated cough and fever with an onset x 1- 2 days PTA. EMS reports that the patient was 84% on a nasal cannula when they arrived on the scene. They were able to get her up to 94% with a breathing treatment. The patient endorses a productive cough with clear sputum and a fever of 102.9 F. EMS reports the patient told them her SOB feels like " she just ran a marathon and is out of breath." Patient is still smoking, but has cut down to 3 cigarettes a day.They report that the patient has had a persistent tremor since they picked her up, which is abnormal for her. She sees wound care at [facility] every two weeks for her abdominal pannus infection, but is not currently on antibiotics for it. Family believes that the infection is getting worse, based on the increased erythema . Patient denies any relief. Denies any modifying factors. The patient denies chest pain or any other symptoms at this time. SHx includes Oophorectomy and Hysterectomy.     On Eliquis for AF.     Post-Op Info:  Procedure(s) (LRB):  DEBRIDEMENT, WOUND, ABDOMEN (Left)         Interval History: Wound continues to improve.  CT shows no drainable collections.     Medications:  Continuous Infusions:  Scheduled Meds:   aluminum-magnesium hydroxide-simethicone  30 mL Oral QID (AC & HS)    ascorbic acid (vitamin C)  500 mg Oral QHS    aspirin  81 mg Oral Daily    atorvastatin  20 mg Oral Daily    budesonide  0.5 mg Nebulization Q12H    fluconazole  100 mg Oral Daily    furosemide  40 mg Oral Daily    gabapentin  800 mg " Oral TID    ipratropium  0.5 mg Nebulization Q8H    levalbuterol  0.63 mg Nebulization Q8H    lisinopril  20 mg Oral Daily    metoprolol succinate  12.5 mg Oral Daily    miconazole NITRATE 2 %   Topical (Top) BID    nystatin-triamcinolone   Topical (Top) TID    pantoprazole  40 mg Oral Daily    potassium chloride SA  20 mEq Oral Daily    senna-docusate 8.6-50 mg  1 tablet Oral BID    spironolactone  25 mg Oral Daily    traZODone  150 mg Oral QHS    vancomycin (VANCOCIN) IVPB  1,500 mg Intravenous Q12H     PRN Meds:acetaminophen, albuterol sulfate, clonazePAM, dextrose 50%, dextrose 50%, glucagon (human recombinant), glucose, glucose, HYDROcodone-acetaminophen, insulin aspart U-100, magnesium oxide, magnesium oxide, morphine, ondansetron, potassium chloride 10%, ramelteon, sodium chloride 0.9%     Review of patient's allergies indicates:   Allergen Reactions    Dilaudid [hydromorphone] Anaphylaxis     Other reaction(s): Anaphylaxis  Other reaction(s): Unknown    Zyvox [linezolid in dextrose 5%] Shortness Of Breath     Objective:     Vital Signs (Most Recent):  Temp: 97.5 °F (36.4 °C) (09/25/18 1556)  Pulse: (!) 58 (09/25/18 1556)  Resp: 20 (09/25/18 1556)  BP: (!) 121/57 (09/25/18 1556)  SpO2: 96 % (09/25/18 1556) Vital Signs (24h Range):  Temp:  [96 °F (35.6 °C)-98.5 °F (36.9 °C)] 97.5 °F (36.4 °C)  Pulse:  [58-90] 58  Resp:  [16-20] 20  SpO2:  [90 %-100 %] 96 %  BP: (105-138)/(49-57) 121/57     Weight: 135 kg (297 lb 9.9 oz)  Body mass index is 45.25 kg/m².    Intake/Output - Last 3 Shifts       09/23 0700 - 09/24 0659 09/24 0700 - 09/25 0659 09/25 0700 - 09/26 0659    P.O. 740 480     IV Piggyback 850 250     Total Intake(mL/kg) 1590 (11.9) 730 (5.4)     Urine (mL/kg/hr) 800      Total Output 800      Net +790 +730            Urine Occurrence  5 x     Stool Occurrence 0 x 0 x           Physical Exam   Constitutional: She is oriented to person, place, and time. She appears well-developed and  well-nourished. No distress.   HENT:   Head: Normocephalic and atraumatic.   Right Ear: External ear normal.   Left Ear: External ear normal.   Eyes: Conjunctivae are normal. Pupils are equal, round, and reactive to light. Right eye exhibits no discharge. Left eye exhibits no discharge.   Neck: No tracheal deviation present. No thyromegaly present.   Cardiovascular: Normal rate and regular rhythm.   Pulmonary/Chest: No respiratory distress.   Abdominal: Soft. She exhibits no distension. There is no guarding.   Musculoskeletal: She exhibits no edema or tenderness.   Lymphadenopathy:     She has no cervical adenopathy.   Neurological: She is alert and oriented to person, place, and time. No cranial nerve deficit.   Skin: Skin is warm and dry. No rash noted. She is not diaphoretic. There is erythema (much improved according to nurse). No pallor.   Psychiatric: She has a normal mood and affect. Her behavior is normal. Judgment and thought content normal.   Nursing note and vitals reviewed.      Significant Labs:  CBC:   Recent Labs   Lab  09/25/18   0528   WBC  9.20   RBC  3.32*   HGB  9.6*   HCT  30.2*   PLT  244   MCV  91   MCH  29.0   MCHC  31.9*       Significant Diagnostics:  I have reviewed all pertinent imaging results/findings within the past 24 hours.    Assessment/Plan:     * Cellulitis of trunk    Will cancel OR debridement as any necessary debridement can be done on floor.  Please call as needed.             Sadi Wilson MD  General Surgery  [facility]

## 2018-09-25 NOTE — PROGRESS NOTES
09/25/18 0745   Patient Assessment/Suction   Level of Consciousness (AVPU) alert   Respiratory Effort Normal;Unlabored   All Lung Fields Breath Sounds diminished   Cough Type good;nonproductive   PRE-TX-O2-ETCO2   O2 Device (Oxygen Therapy) nasal cannula   $ Is the patient on Low Flow Oxygen? Yes   Flow (L/min) 2   Oxygen Concentration (%) 28   SpO2 95 %   Pulse Oximetry Type Intermittent   $ Pulse Oximetry - Multiple Charge Pulse Oximetry - Multiple   Pulse 63   Resp 18   Aerosol Therapy   $ Aerosol Therapy Charges Aerosol Treatment   Respiratory Treatment Status given   SVN/Inhaler Treatment Route mask   Position During Treatment HOB at 90 degrees   Patient Tolerance good   Post-Treatment   Post-treatment Heart Rate (beats/min) 65   Post-treatment Resp Rate (breaths/min) 18   All Fields Breath Sounds unchanged   Xop .63/ Atro Q8, pulm Q12, vitals as charted, tolerated mask tx well, NC 2L.

## 2018-09-25 NOTE — PROGRESS NOTES
09/24/18 1955   Patient Assessment/Suction   Level of Consciousness (AVPU) alert   Respiratory Effort Normal;Unlabored   All Lung Fields Breath Sounds diminished   Cough Frequency infrequent   Cough Type good;nonproductive   PRE-TX-O2-ETCO2   O2 Device (Oxygen Therapy) nasal cannula   Flow (L/min) 3   Oxygen Concentration (%) 32   SpO2 100 %   Pulse Oximetry Type Intermittent   Pulse 74   Resp 18   Aerosol Therapy   $ Aerosol Therapy Charges Aerosol Treatment  (pulmicort)   Respiratory Treatment Status given   SVN/Inhaler Treatment Route mask   Position During Treatment HOB at 45 degrees   Patient Tolerance good   Post-Treatment   Post-treatment Heart Rate (beats/min) 75   Post-treatment Resp Rate (breaths/min) 18   All Fields Breath Sounds unchanged   Ready to Wean/Extubation Screen   FIO2<=50 (chart decimal) 0.32

## 2018-09-25 NOTE — PROGRESS NOTES
9/25/2018  Summary:  Review of EMR, noted patient is currently in the hospital with cellulitis of the abdomen, sepsis. Collaboration with IPCM re discharge plan.  OPCM will follow up post discharge.      Intervention: review of EMR, collaboration with IPCM RN following patient.    Plan: review diet, encourage exercise, follow post acute d/c plan with patient.  JUANITA Zhu

## 2018-09-25 NOTE — SUBJECTIVE & OBJECTIVE
Interval History: Pt is feeling better. She has less abd wall pain. No SOB or cough    Review of Systems   Respiratory: Negative.    Cardiovascular: Negative.    Gastrointestinal: Negative.    Musculoskeletal: Negative.    Skin: Negative.    Neurological: Negative.    Psychiatric/Behavioral: Negative.      Objective:     Vital Signs (Most Recent):  Temp: 98.2 °F (36.8 °C) (09/25/18 1117)  Pulse: 73 (09/25/18 1117)  Resp: 20 (09/25/18 1117)  BP: (!) 138/57 (09/25/18 1117)  SpO2: 99 % (09/25/18 1117) Vital Signs (24h Range):  Temp:  [96 °F (35.6 °C)-98.5 °F (36.9 °C)] 98.2 °F (36.8 °C)  Pulse:  [63-90] 73  Resp:  [16-20] 20  SpO2:  [90 %-100 %] 99 %  BP: (105-138)/(49-57) 138/57     Weight: 135 kg (297 lb 9.9 oz)  Body mass index is 45.25 kg/m².    Intake/Output Summary (Last 24 hours) at 9/25/2018 1543  Last data filed at 9/25/2018 0600  Gross per 24 hour   Intake 730 ml   Output --   Net 730 ml      Physical Exam   Constitutional: She is oriented to person, place, and time.   Neck: Neck supple.   Cardiovascular: Normal rate, regular rhythm and normal heart sounds.   Pulmonary/Chest: Effort normal and breath sounds normal.   Abdominal: Soft. Bowel sounds are normal.   Large ventral hernia   Neurological: She is alert and oriented to person, place, and time.   Skin: Skin is warm and dry.   Multiple shallow ulcerations with surrounding erythema on the left side of the abd wall.    Psychiatric: She has a normal mood and affect.   Vitals reviewed.

## 2018-09-25 NOTE — CONSULTS
Date: 9/25/2018   Nelly Tian [id number] is a 67 y.o. female who has been consulted for vancomycin dosing.    The patient has the following labs:     Creatinine (mg/dl)    WBC Count   Serum creatinine: 0.6 mg/dL 09/25/18 0528  Estimated creatinine clearance: 134.4 mL/min Lab Results   Component Value Date    WBC 9.20 09/25/2018        Current weight is 135 kg (297 lb 9.9 oz)      Pt is receiving vancomycin 1250 mg every 12 hours.  Vancomycin trough from 9/25/2018 at 0528 was 9.3 mg/dL.  Target goal is 10-15 mg/dL.   Trough was drawn on time and anticipate it is  Subtherapeutic. Pharmacy will increase current dose.   The patient will be changed to a vancomycin dose of 1500 mg every 12 hours. The vancomycin trough has been ordered for 9/27 at 1030.    Patient will be followed by pharmacy for changes in renal function, toxicity, and efficacy.    Thank you for allowing us to participate in this patient's care.    Kayla Kulkarni, PharmD

## 2018-09-25 NOTE — PROGRESS NOTES
[facility] Hospital Medicine  Progress Note    Patient Name: Nelly Tian  MRN: [id number]  Patient Class: IP- Inpatient   Admission Date: 9/23/2018  Length of Stay: 2 days  Attending Physician: Louisa Menard MD  Primary Care Provider: Constantine Bird MD        Subjective:     Principal Problem:Cellulitis of trunk    HPI:  66 y/o female developed fever today and felt awful. She's had a productive cough, SOB and wheezing for about 3 days. Her daughter noticed that her abdominal wall was erythematous. The pt has been dealing with ulcerations in her pannus for months and was hospitalized last month with cellulitis. She receives tx at the wound care clinic. Dr. Tan is her doctor.    She has well controlled DM.  She has Oxygent dependent COPD and continues to smoke about 4 cigs a day.    She denies nausea, vomiting, abdominal pain, diarrhea or dysuria.    Hospital Course:  No notes on file    Interval History: The pt continues to improve. The rash in the abd wall continues to improve and the pain is better controlled    Review of Systems   Respiratory: Negative.    Cardiovascular: Negative.    Gastrointestinal: Negative.    Musculoskeletal: Negative.    Skin: Negative.    Neurological: Negative.    Psychiatric/Behavioral: Negative.      Objective:     Vital Signs (Most Recent):  Temp: 97.5 °F (36.4 °C) (09/25/18 1556)  Pulse: (!) 58 (09/25/18 1556)  Resp: 20 (09/25/18 1556)  BP: (!) 121/57 (09/25/18 1556)  SpO2: 96 % (09/25/18 1556) Vital Signs (24h Range):  Temp:  [96 °F (35.6 °C)-98.5 °F (36.9 °C)] 97.5 °F (36.4 °C)  Pulse:  [58-90] 58  Resp:  [16-20] 20  SpO2:  [90 %-100 %] 96 %  BP: (105-138)/(49-57) 121/57     Weight: 135 kg (297 lb 9.9 oz)  Body mass index is 45.25 kg/m².    Intake/Output Summary (Last 24 hours) at 9/25/2018 1558  Last data filed at 9/25/2018 0600  Gross per 24 hour   Intake 730 ml   Output --   Net 730 ml      Physical Exam   Constitutional: She is oriented to person,  place, and time.   Neck: Neck supple.   Cardiovascular: Normal rate, regular rhythm and normal heart sounds.   Pulmonary/Chest: Effort normal and breath sounds normal.   Abdominal: Soft. Bowel sounds are normal.   Large ventral hernia   Neurological: She is alert and oriented to person, place, and time.   Skin: Skin is warm and dry.   Multiple shallow ulcerations with surrounding erythema on the left side of the abd wall.    Psychiatric: She has a normal mood and affect.   Vitals reviewed.      Significant Labs:   BMP:   Recent Labs   Lab  09/25/18   0528   GLU  108   NA  141   K  4.6   CL  101   CO2  30*   BUN  8   CREATININE  0.6   CALCIUM  9.4     CBC:   Recent Labs   Lab  09/25/18   0528   WBC  9.20   HGB  9.6*   HCT  30.2*   PLT  244       Significant Imaging: I have reviewed all pertinent imaging results/findings within the past 24 hours.    Assessment/Plan:      * Cellulitis of trunk    The pt is on broad spectrum IV Abx and the wounds look better. She may not need sx          Diabetes mellitus with neuropathy    Patient's FSGs are controlled on current hypoglycemics.   Last A1c reviewed-   Lab Results   Component Value Date    HGBA1C 5.6 09/23/2018     Most recent fingerstick glucose reviewed-   Recent Labs   Lab  09/24/18   1614  09/24/18   2136  09/25/18   0547  09/25/18   1044   POCTGLUCOSE  124*  123*  105  128*     Current correctional scale  Low  Maintain anti-hyperglycemic dose as follows-   Antihyperglycemics (From admission, onward)    Start     Stop Route Frequency Ordered    09/23/18 1720  insulin aspart U-100 pen 1-10 Units      -- SubQ Before meals & nightly PRN 09/23/18 1703                  Chronic atrial fibrillation    I will hold Eliquis in case she needs I&D          Hypertension associated with diabetes    Chronic, controlled.  Monitor BP closely.  Will continue BP medications as needed for sustained BP control.            COPD (chronic obstructive pulmonary disease)    Pt with current  exacerbation.  Treat with bronchodilators and steroids          Morbid obesity with BMI of 40.0-44.9, adult    Body mass index is 45.01 kg/m². Morbid obesity complicates all aspects of disease management from diagnostic modalities to treatment. Weight loss encouraged and health benefits explained to patient.              VTE Risk Mitigation (From admission, onward)    None              Louisa Menard MD  Department of Hospital Medicine   [facility]

## 2018-09-26 ENCOUNTER — ANESTHESIA (OUTPATIENT)
Dept: SURGERY | Facility: HOSPITAL | Age: 68
End: 2018-09-26

## 2018-09-26 VITALS
WEIGHT: 293 LBS | HEART RATE: 77 BPM | HEIGHT: 68 IN | BODY MASS INDEX: 44.41 KG/M2 | DIASTOLIC BLOOD PRESSURE: 66 MMHG | OXYGEN SATURATION: 93 % | RESPIRATION RATE: 18 BRPM | TEMPERATURE: 98 F | SYSTOLIC BLOOD PRESSURE: 162 MMHG

## 2018-09-26 LAB
POCT GLUCOSE: 133 MG/DL (ref 70–110)
POCT GLUCOSE: 162 MG/DL (ref 70–110)

## 2018-09-26 PROCEDURE — 25000003 PHARM REV CODE 250: Performed by: INTERNAL MEDICINE

## 2018-09-26 PROCEDURE — 63600175 PHARM REV CODE 636 W HCPCS: Performed by: INTERNAL MEDICINE

## 2018-09-26 PROCEDURE — 94761 N-INVAS EAR/PLS OXIMETRY MLT: CPT

## 2018-09-26 PROCEDURE — 25000242 PHARM REV CODE 250 ALT 637 W/ HCPCS: Performed by: INTERNAL MEDICINE

## 2018-09-26 PROCEDURE — 94640 AIRWAY INHALATION TREATMENT: CPT

## 2018-09-26 PROCEDURE — 27000221 HC OXYGEN, UP TO 24 HOURS

## 2018-09-26 RX ORDER — CLINDAMYCIN HYDROCHLORIDE 300 MG/1
300 CAPSULE ORAL 3 TIMES DAILY
Qty: 15 CAPSULE | Refills: 0 | Status: SHIPPED | OUTPATIENT
Start: 2018-09-26 | End: 2018-10-01

## 2018-09-26 RX ORDER — FLUCONAZOLE 100 MG/1
100 TABLET ORAL ONCE
Qty: 1 TABLET | Refills: 5 | Status: SHIPPED | OUTPATIENT
Start: 2018-09-26 | End: 2018-09-26

## 2018-09-26 RX ADMIN — INSULIN ASPART 2 UNITS: 100 INJECTION, SOLUTION INTRAVENOUS; SUBCUTANEOUS at 11:09

## 2018-09-26 RX ADMIN — ATORVASTATIN CALCIUM 20 MG: 20 TABLET, FILM COATED ORAL at 09:09

## 2018-09-26 RX ADMIN — FUROSEMIDE 40 MG: 40 TABLET ORAL at 09:09

## 2018-09-26 RX ADMIN — LEVALBUTEROL HYDROCHLORIDE 0.63 MG: 0.63 SOLUTION RESPIRATORY (INHALATION) at 12:09

## 2018-09-26 RX ADMIN — PANTOPRAZOLE SODIUM 40 MG: 40 TABLET, DELAYED RELEASE ORAL at 09:09

## 2018-09-26 RX ADMIN — SENNOSIDES AND DOCUSATE SODIUM 1 TABLET: 8.6; 5 TABLET ORAL at 09:09

## 2018-09-26 RX ADMIN — IPRATROPIUM BROMIDE 0.5 MG: 0.5 SOLUTION RESPIRATORY (INHALATION) at 12:09

## 2018-09-26 RX ADMIN — LEVALBUTEROL HYDROCHLORIDE 0.63 MG: 0.63 SOLUTION RESPIRATORY (INHALATION) at 07:09

## 2018-09-26 RX ADMIN — VANCOMYCIN HYDROCHLORIDE 1500 MG: 1 INJECTION, POWDER, LYOPHILIZED, FOR SOLUTION INTRAVENOUS at 05:09

## 2018-09-26 RX ADMIN — Medication: at 09:09

## 2018-09-26 RX ADMIN — NYSTATIN AND TRIAMCINOLONE ACETONIDE: 100000; 1 CREAM TOPICAL at 09:09

## 2018-09-26 RX ADMIN — GABAPENTIN 800 MG: 400 CAPSULE ORAL at 09:09

## 2018-09-26 RX ADMIN — SPIRONOLACTONE 25 MG: 25 TABLET, FILM COATED ORAL at 09:09

## 2018-09-26 RX ADMIN — BUDESONIDE 0.5 MG: 0.5 INHALANT RESPIRATORY (INHALATION) at 07:09

## 2018-09-26 RX ADMIN — IPRATROPIUM BROMIDE 0.5 MG: 0.5 SOLUTION RESPIRATORY (INHALATION) at 07:09

## 2018-09-26 RX ADMIN — ASPIRIN 81 MG: 81 TABLET, COATED ORAL at 09:09

## 2018-09-26 RX ADMIN — POTASSIUM CHLORIDE 20 MEQ: 20 TABLET, EXTENDED RELEASE ORAL at 10:09

## 2018-09-26 RX ADMIN — METOPROLOL SUCCINATE 12.5 MG: 25 TABLET, EXTENDED RELEASE ORAL at 09:09

## 2018-09-26 RX ADMIN — LISINOPRIL 20 MG: 10 TABLET ORAL at 09:09

## 2018-09-26 RX ADMIN — FLUCONAZOLE 100 MG: 100 TABLET ORAL at 10:09

## 2018-09-26 RX ADMIN — HYDROCODONE BITARTRATE AND ACETAMINOPHEN 1 TABLET: 10; 325 TABLET ORAL at 10:09

## 2018-09-26 NOTE — PLAN OF CARE
09/26/18 1051   Final Note   Assessment Type Final Discharge Note   Discharge Disposition Home-Health   Hospital Follow Up  Appt(s) scheduled? Yes

## 2018-09-26 NOTE — PLAN OF CARE
SSC met with patient at bedside regarding resumption of home health services.  Patient signed patient preference form choosing to resume home health with [facility].  SSC sent patient information to [facility] through [facility] for resumption of home health. BERNICE Kirk    · 9/26/2018 12:32:05 PM Accepted: thank Bradley tomorrow  [email or web address]

## 2018-09-26 NOTE — PROGRESS NOTES
09/25/18 1902   Patient Assessment/Suction   Level of Consciousness (AVPU) alert   Respiratory Effort Normal;Unlabored   All Lung Fields Breath Sounds coarse   Cough Frequency infrequent   Cough Type good;loose   PRE-TX-O2-ETCO2   O2 Device (Oxygen Therapy) nasal cannula   Flow (L/min) 3   Oxygen Concentration (%) 32   SpO2 97 %   Pulse Oximetry Type Intermittent   Pulse 60   Resp 18   Aerosol Therapy   $ Aerosol Therapy Charges Aerosol Treatment   Respiratory Treatment Status given   SVN/Inhaler Treatment Route mask   Position During Treatment HOB at 45 degrees   Patient Tolerance good   Post-Treatment   Post-treatment Heart Rate (beats/min) 59   Post-treatment Resp Rate (breaths/min) 18   All Fields Breath Sounds unchanged   Ready to Wean/Extubation Screen   FIO2<=50 (chart decimal) 0.32

## 2018-09-26 NOTE — PLAN OF CARE
Scheduled hospital f/u with PCP office; placed on AVS.       09/26/18 1043   Discharge Assessment   Assessment Type Discharge Planning Reassessment

## 2018-09-26 NOTE — HOSPITAL COURSE
68 y/o WF with chronic wounds on the left abdominal wall presented with cellulitis in the same area.    She was treated with vancomycin and wound care.  She was evaluated by Dr. Molina who did not think she needed debridement.  He obtained a CT scan of the abdomen which did not show an abscess.    The pt is doing better and will be discharged home on clindamycin. She will go back to the wound care clinic.    Her other medical problems were stable.

## 2018-09-26 NOTE — PLAN OF CARE
09/26/18 0731   Patient Assessment/Suction   Level of Consciousness (AVPU) alert   All Lung Fields Breath Sounds diminished   PRE-TX-O2-ETCO2   O2 Device (Oxygen Therapy) nasal cannula   $ Is the patient on Low Flow Oxygen? Yes   Flow (L/min) 3   Oxygen Concentration (%) 32   SpO2 97 %   Pulse Oximetry Type Intermittent   $ Pulse Oximetry - Multiple Charge Pulse Oximetry - Multiple   Pulse (!) 58   Resp 18   Aerosol Therapy   $ Aerosol Therapy Charges Aerosol Treatment   Respiratory Treatment Status given   SVN/Inhaler Treatment Route mask   Position During Treatment HOB at 45 degrees   Patient Tolerance good   Post-Treatment   Post-treatment Heart Rate (beats/min) 64   Post-treatment Resp Rate (breaths/min) 18   All Fields Breath Sounds unchanged   Ready to Wean/Extubation Screen   FIO2<=50 (chart decimal) 0.32

## 2018-09-26 NOTE — DISCHARGE SUMMARY
[facility] Hospital Medicine  Discharge Summary      Patient Name: Nelly Tian  MRN: [id number]  Admission Date: 9/23/2018  Hospital Length of Stay: 3 days  Discharge Date and Time:  09/26/2018 11:42 AM  Attending Physician: Louisa Menard MD   Discharging Provider: Louisa Menard MD  Primary Care Provider: Constantine Bird MD      HPI:   68 y/o female developed fever today and felt awful. She's had a productive cough, SOB and wheezing for about 3 days. Her daughter noticed that her abdominal wall was erythematous. The pt has been dealing with ulcerations in her pannus for months and was hospitalized last month with cellulitis. She receives tx at the wound care clinic. Dr. Tan is her doctor.    She has well controlled DM.  She has Oxygent dependent COPD and continues to smoke about 4 cigs a day.    She denies nausea, vomiting, abdominal pain, diarrhea or dysuria.    Procedure(s) (LRB):  DEBRIDEMENT, WOUND, ABDOMEN (Left)      Hospital Course:   68 y/o WF with chronic wounds on the left abdominal wall presented with cellulitis in the same area.    She was treated with vancomycin and wound care.  She was evaluated by Dr. Molina who did not think she needed debridement.  He obtained a CT scan of the abdomen which did not show an abscess.    The pt is doing better and will be discharged home on clindamycin. She will go back to the wound care clinic.    Her other medical problems were stable.     Consults:   Consults (From admission, onward)        Status Ordering Provider     Inpatient consult to General Surgery  Once     Provider:  Sadi Wilson MD    Completed LOUISA MENARD     IP consult to case management  Once     Provider:  (Not yet assigned)    Completed LOUISA MENARD     Pharmacy to dose Vancomycin consult  Once     Provider:  (Not yet assigned)    Acknowledged LOUISA MENARD          No new Assessment & Plan notes have been filed under this hospital service since the last  note was generated.  Service: Hospital Medicine    Final Active Diagnoses:    Diagnosis Date Noted POA    PRINCIPAL PROBLEM:  Cellulitis of trunk [L03.319] 08/22/2018 Unknown    Diabetes mellitus with neuropathy [E11.40] 08/13/2012 Yes     Chronic    Chronic atrial fibrillation [I48.2] 11/10/2015 Yes    Hypertension associated with diabetes [E11.59, I10] 01/19/2016 Yes    COPD (chronic obstructive pulmonary disease) [J44.9] 04/05/2017 Yes    Morbid obesity with BMI of 40.0-44.9, adult [E66.01, Z68.41] 11/10/2015 Not Applicable      Problems Resolved During this Admission:       Discharged Condition: stable    Disposition: Home or Self Care    Follow Up:  Follow-up Information     wound care clinic In 1 day.    Why:  For wound re-check           [facility].    Specialty:  Home Health Services  Why:  Home Health  Contact information:  [place] [id number]  [phone]                 Patient Instructions:      Diet diabetic     Change dressing (specify)   Order Comments: Dressing change: daily     SUBSEQUENT HOME HEALTH ORDERS   Order Comments: Subsequent Home Health Orders    Current Medications:  Current Facility-Administered Medications:  acetaminophen tablet 1,000 mg, 1,000 mg, Oral, Q6H PRN, Louisa Menard MD, 1,000 mg at 09/23/18 2028  albuterol sulfate nebulizer solution 5 mg, 5 mg, Nebulization, Q4H PRN, Geovanny Weinstein MD  aluminum-magnesium hydroxide-simethicone 200-200-20 mg/5 mL suspension 30 mL, 30 mL, Oral, QID (AC & HS), DARIEN Jo, 30 mL at 09/24/18 1100  ascorbic acid (vitamin C) tablet 500 mg, 500 mg, Oral, QHS, Louisa Menard MD, 500 mg at 09/25/18 2040  aspirin EC tablet 81 mg, 81 mg, Oral, Daily, Louisa Menard MD, 81 mg at 09/26/18 0951  atorvastatin tablet 20 mg, 20 mg, Oral, Daily, Louisa Menard MD, 20 mg at 09/26/18 0951  budesonide nebulizer solution 0.5 mg, 0.5 mg, Nebulization, Q12H, Louisa Menard MD, 0.5 mg  at 09/26/18 0732  clonazePAM tablet 1 mg, 1 mg, Oral, BID PRN, Louisa Menard MD, 1 mg at 09/25/18 2039  dextrose 50% injection 12.5 g, 12.5 g, Intravenous, PRN, Louisa Menard MD  dextrose 50% injection 25 g, 25 g, Intravenous, PRN, Louisa Menard MD  fluconazole tablet 100 mg, 100 mg, Oral, Daily, Louisa Menard MD, 100 mg at 09/26/18 1005  furosemide tablet 40 mg, 40 mg, Oral, Daily, Louisa Menard MD, 40 mg at 09/26/18 0951  gabapentin capsule 800 mg, 800 mg, Oral, TID, Louisa Menard MD, 800 mg at 09/26/18 0951  glucagon (human recombinant) injection 1 mg, 1 mg, Intramuscular, PRN, Louisa Menard MD  glucose chewable tablet 16 g, 16 g, Oral, PRN, Louisa Menard MD  glucose chewable tablet 24 g, 24 g, Oral, PRN, Louisa Menard MD  HYDROcodone-acetaminophen  mg per tablet 1 tablet, 1 tablet, Oral, Q8H PRN, Louisa Menard MD, 1 tablet at 09/26/18 1005  insulin aspart U-100 pen 1-10 Units, 1-10 Units, Subcutaneous, QID (AC + HS) PRN, Louisa Menard MD  ipratropium 0.02 % nebulizer solution 0.5 mg, 0.5 mg, Nebulization, Q8H, Louisa Menard MD, 0.5 mg at 09/26/18 0731  levalbuterol nebulizer solution 0.63 mg, 0.63 mg, Nebulization, Q8H, Louisa Menard MD, 0.63 mg at 09/26/18 0731  lisinopril tablet 20 mg, 20 mg, Oral, Daily, Louisa Menard MD, 20 mg at 09/26/18 0951  magnesium oxide tablet 800 mg, 800 mg, Oral, PRN, Louisa Menard MD  magnesium oxide tablet 800 mg, 800 mg, Oral, PRN, Louisa Menard MD  metoprolol succinate (TOPROL-XL) 24 hr split tablet 12.5 mg, 12.5 mg, Oral, Daily, Louisa Menard MD, 12.5 mg at 09/26/18 0951  miconazole NITRATE 2 % top powder, , Topical (Top), BID, Louisa Menard MD  morphine injection 2 mg, 2 mg, Intravenous, Q4H PRN, Louisa Menard MD, 2 mg at 09/24/18 1543  nystatin-triamcinolone cream, , Topical (Top), TID, Louisa Menard MD  ondansetron injection 8 mg, 8 mg, Intravenous, Q8H PRN, Louisa Menard,  MD  pantoprazole EC tablet 40 mg, 40 mg, Oral, Daily, Louisa Menard MD, 40 mg at 09/26/18 0951  potassium chloride 10% oral solution 40 mEq, 40 mEq, Oral, PRN, Louisa Menard MD  potassium chloride SA CR tablet 20 mEq, 20 mEq, Oral, Daily, Louisa Menard MD, 20 mEq at 09/26/18 1005  ramelteon tablet 8 mg, 8 mg, Oral, Nightly PRN, Louisa Menard MD  senna-docusate 8.6-50 mg per tablet 1 tablet, 1 tablet, Oral, BID, Louisa Menard MD, 1 tablet at 09/26/18 0951  sodium chloride 0.9% flush 5 mL, 5 mL, Intravenous, PRN, Louisa Menard MD  spironolactone tablet 25 mg, 25 mg, Oral, Daily, Louisa Menard MD, 25 mg at 09/26/18 0951  traZODone tablet 150 mg, 150 mg, Oral, QHS, Louisa Menard MD, 100 mg at 09/25/18 2040  vancomycin (VANCOCIN) 1,500 mg in dextrose 5 % 250 mL IVPB, 1,500 mg, Intravenous, Q12H, Louisa Menard MD, Last Rate: 166.7 mL/hr at 09/26/18 0528, 1,500 mg at 09/26/18 0528        Nursing:   Wound Care Orders:  yes:  abd wall ulcer - daily dressing changes with Medihoney.      Activities:   activity as tolerated      Referrals/ Consults  Physical Therapy to evaluate and treat. Evaluate for home safety and equipment needs; Establish/upgrade home exercise program. Perform / instruct on therapeutic exercises, gait training, transfer training, and Range of Motion.     Order Specific Question Answer Comments   What Home Health Agency is the patient currently using? [facility]      Change dressing (specify)   Order Comments: Dressing change: change dressing daily with Medihoney     Activity as tolerated       Significant Diagnostic Studies: Labs:   BMP:   Recent Labs   Lab  09/25/18 0528   GLU  108   NA  141   K  4.6   CL  101   CO2  30*   BUN  8   CREATININE  0.6   CALCIUM  9.4    and CBC   Recent Labs   Lab  09/25/18 0528   WBC  9.20   HGB  9.6*   HCT  30.2*   PLT  244       Pending Diagnostic Studies:     None         Medications:  Reconciled Home Medications:       Medication List      START taking these medications    clindamycin 300 MG capsule  Commonly known as:  CLEOCIN  Take 1 capsule (300 mg total) by mouth 3 (three) times daily. for 5 days     fluconazole 100 MG tablet  Commonly known as:  DIFLUCAN  Take 1 tablet (100 mg total) by mouth once. for 1 dose        CHANGE how you take these medications    clonazePAM 1 MG tablet  Commonly known as:  KLONOPIN  1 tab po bid prn  What changed:    · how much to take  · how to take this  · when to take this  · reasons to take this  · additional instructions     furosemide 40 MG tablet  Commonly known as:  LASIX  TAKE 1 TABLET ONE TIME DAILY  FOR  FLUID  OVERLOAD  What changed:  See the new instructions.        CONTINUE taking these medications    * albuterol 90 mcg/actuation inhaler  Commonly known as:  VENTOLIN HFA  Inhale 2 puffs into the lungs every 6 (six) hours as needed. Rescue     * albuterol 0.63 mg/3 mL Nebu  Commonly known as:  ACCUNEB  INHALE THE CONTENTS OF 1 VIAL VIA NEBULIZER EVERY 6 HOURS AS NEEDED     apixaban 5 mg Tab  Commonly known as:  ELIQUIS  Take 1 tablet (5 mg total) by mouth 2 (two) times daily.     ascorbic acid (vitamin C) 500 MG tablet  Commonly known as:  VITAMIN C  Take 1 tablet (500 mg total) by mouth every evening.     aspirin 81 MG EC tablet  Commonly known as:  ECOTRIN  Take 81 mg by mouth once daily.     atorvastatin 20 MG tablet  Commonly known as:  LIPITOR  TAKE 1 TABLET EVERY DAY     BREO ELLIPTA 100-25 mcg/dose diskus inhaler  Generic drug:  fluticasone-vilanterol  INHALE 1 PUFF INTO THE LUNGS ONE TIME DAILY (CONTROLLER)     budesonide 0.5 mg/2 mL nebulizer solution  Commonly known as:  PULMICORT  INHALE THE CONTENTS OF 1 VIAL VIA NEBULIZER EVERY DAY     gabapentin 800 MG tablet  Commonly known as:  NEURONTIN  TAKE ONE TABLET BY MOUTH THREE TIMES DAILY     HYDROcodone-acetaminophen  mg per tablet  Commonly known as:  NORCO  Take 1 tablet by mouth every 8 (eight) hours as needed for  Pain (Do not take more than 3 a day. Do not mix with other narcotics.).     KLOR-CON M20 20 MEQ tablet  Generic drug:  potassium chloride SA  TAKE ONE TABLET BY MOUTH ONCE DAILY     levalbuterol 0.63 mg/3 mL nebulizer solution  Commonly known as:  XOPENEX  INHALE THE CONTENTS OF 1 VIAL BY NEBULIZATION 4 times  DAILY.    DX: J43.9     lisinopril 20 MG tablet  Commonly known as:  PRINIVIL,ZESTRIL  TAKE 1 TABLET EVERY DAY     metFORMIN 500 MG tablet  Commonly known as:  GLUCOPHAGE  Take 1 tablet (500 mg total) by mouth 2 (two) times daily with meals.     metoprolol succinate 25 MG 24 hr tablet  Commonly known as:  TOPROL-XL  TAKE 1/2 TABLET EVERY DAY     multivitamin tablet  Commonly known as:  THERAGRAN  Take 1 tablet by mouth once daily.     nystatin-triamcinolone cream  Commonly known as:  MYCOLOG II  APPLY SPARINGLY TO AFFECTED AREA(S) 3 TIMES DAILY AS NEEDED     NYSTOP powder  Generic drug:  nystatin  APPLY  POWDER TOPICALLY TO AFFECTED AREA 4 TIMES DAILY     omeprazole 20 MG capsule  Commonly known as:  PRILOSEC  Take 1 capsule (20 mg total) by mouth once daily.     polyethylene glycol 17 gram/dose powder  Commonly known as:  GLYCOLAX     senna-docusate 8.6-50 mg 8.6-50 mg per tablet  Commonly known as:  PERICOLACE  Take 1 tablet by mouth 2 (two) times daily as needed for Constipation.     silver sulfADIAZINE 1% 1 % cream  Commonly known as:  SILVADENE  Apply topically once daily.     spironolactone 25 MG tablet  Commonly known as:  ALDACTONE  Take 1 tablet (25 mg total) by mouth once daily.     traZODone 150 MG tablet  Commonly known as:  DESYREL  TAKE 1 TABLET EVERY EVENING.         * This list has 2 medication(s) that are the same as other medications prescribed for you. Read the directions carefully, and ask your doctor or other care provider to review them with you.                Indwelling Lines/Drains at time of discharge:   Lines/Drains/Airways     Pressure Ulcer                 Pressure Ulcer 03/20/17  0829 Stage  days                Time spent on the discharge of patient: 30 minutes  Patient was seen and examined on the date of discharge and determined to be suitable for discharge.         Louisa Menard MD  Department of Hospital Medicine  [facility]

## 2018-09-26 NOTE — PLAN OF CARE
Problem: Patient Care Overview  Goal: Plan of Care Review  Outcome: Ongoing (interventions implemented as appropriate)  Pt resting quietly with no signs of distress. Pt free from falls during shift. Bed low locked, side rails up x 2, call light in reach. Will continue to monitor.

## 2018-09-26 NOTE — PLAN OF CARE
Recommendations for discharge:   Daily:  Wash with wound cleanser, apply Medihoney (patient states she has it at home) and cover with bordered gauze.  Strongly recommend that she see [facility] wound care tomorrow for re-evaluation and continued plan of care.

## 2018-09-27 ENCOUNTER — TELEPHONE (OUTPATIENT)
Dept: MEDSURG UNIT | Facility: HOSPITAL | Age: 68
End: 2018-09-27

## 2018-09-27 ENCOUNTER — TELEPHONE (OUTPATIENT)
Dept: FAMILY MEDICINE | Facility: CLINIC | Age: 68
End: 2018-09-27

## 2018-09-27 DIAGNOSIS — M51.36 LUMBAR DEGENERATIVE DISC DISEASE: ICD-10-CM

## 2018-09-27 DIAGNOSIS — M43.12 SPONDYLOLISTHESIS OF CERVICAL REGION: ICD-10-CM

## 2018-09-27 NOTE — PHYSICIAN QUERY
PT Name: Nelly Tian  MR #: [id number]    Physician Query Form - Emergency Medicine Diagnosis Clarification     CDS/: SHAUN Adam, RN, CCDS               Contact information: [email or web address]  This form is a permanent document in the medical record.     Query Date: September 27, 2018    By submitting this query, we are merely seeking further clarification of documentation.  Please utilize your independent clinical judgment when addressing the question(s) below.      The Medical record contains the following:     Diagnosis Supporting Clinical Information Location in Medical Record     Sepsis   A diagnosis of Sepsis was also pertinent to this visit.    She sees wound care every two weeks for her abdominal pannus infection, but is not currently on antibiotics for it. Family believes that the infection is getting worse, based on the increased erythema     developed fever today and felt awful. She's had a productive cough, SOB and wheezing for about 3 days    Blood culture: NGTD  WBC: 15.80  Lactate: 2.9  Procal: <0.02    68 y/o WF with chronic wounds on the left abdominal wall presented with cellulitis in the same area.     She was treated with vancomycin and wound care.  She was evaluated by Dr. Molina who did not think she needed debridement.  He obtained a CT scan of the abdomen which did not show an abscess.     The pt is doing better and will be discharged home on clindamycin.     ED Provider Note: 9/23            H & P: 9/23      Lab: 9/23          DC Summary: 9/26               Do you agree with the Emergency Medicine diagnosis of ________Sepsis___________________________?    [  ] Yes  [  ] Yes, but it resolved prior to my assessment of the patient  [ x ] No  [  ] Clinically Insignificant  [  ] Other/Clarification of Findings:_________________________________  [  ] Clinically Undetermined    Please document in your progress notes daily for the duration of treatment until resolved and include in  your discharge summary.

## 2018-09-27 NOTE — TELEPHONE ENCOUNTER
----- Message from Kathleen Pickens sent at 9/27/2018  2:09 PM CDT -----  Contact: maura orellana/ [facility]#[phone]  maura orellana/ [facility]#[phone] call stated patient don't want PT

## 2018-09-27 NOTE — TELEPHONE ENCOUNTER
Patient requesting a refill of Hydrocodone.  LR--9-4-18  LOV--9-17-18  FOV--10-22-18  Urine Toxicology--10-25-17  Pain Contract--10-25-17

## 2018-09-27 NOTE — TELEPHONE ENCOUNTER
Patient requesting a refill of Clonazepam.  LR--8-11-18  LOV--9-17-18  FOV--10-22-18  Urine Toxicology--10-25-17  Pain Contract--10-25-17

## 2018-09-28 ENCOUNTER — PATIENT MESSAGE (OUTPATIENT)
Dept: FAMILY MEDICINE | Facility: CLINIC | Age: 68
End: 2018-09-28

## 2018-09-28 RX ORDER — HYDROCODONE BITARTRATE AND ACETAMINOPHEN 10; 325 MG/1; MG/1
1 TABLET ORAL EVERY 8 HOURS PRN
Qty: 90 TABLET | Refills: 0 | Status: SHIPPED | OUTPATIENT
Start: 2018-09-28 | End: 2018-10-30 | Stop reason: SDUPTHER

## 2018-09-28 RX ORDER — CLONAZEPAM 1 MG/1
TABLET ORAL
Qty: 60 TABLET | Refills: 1 | OUTPATIENT
Start: 2018-09-28

## 2018-09-29 LAB
BACTERIA BLD CULT: NORMAL
BACTERIA BLD CULT: NORMAL

## 2018-10-03 DIAGNOSIS — E11.610 DIABETIC NEUROGENIC ARTHROPATHY: ICD-10-CM

## 2018-10-03 DIAGNOSIS — I50.22 CHRONIC SYSTOLIC CONGESTIVE HEART FAILURE: ICD-10-CM

## 2018-10-03 DIAGNOSIS — M51.36 LUMBAR DEGENERATIVE DISC DISEASE: ICD-10-CM

## 2018-10-03 RX ORDER — FUROSEMIDE 40 MG/1
TABLET ORAL
Qty: 60 TABLET | Refills: 5 | Status: SHIPPED | OUTPATIENT
Start: 2018-10-03 | End: 2018-12-06 | Stop reason: SDUPTHER

## 2018-10-03 RX ORDER — GABAPENTIN 800 MG/1
TABLET ORAL
Qty: 270 TABLET | Refills: 3 | Status: SHIPPED | OUTPATIENT
Start: 2018-10-03 | End: 2018-12-03 | Stop reason: SDUPTHER

## 2018-10-03 RX ORDER — CLONAZEPAM 1 MG/1
TABLET ORAL
Qty: 60 TABLET | Refills: 2 | Status: SHIPPED | OUTPATIENT
Start: 2018-10-03 | End: 2018-12-06 | Stop reason: SDUPTHER

## 2018-10-04 ENCOUNTER — OUTPATIENT CASE MANAGEMENT (OUTPATIENT)
Dept: ADMINISTRATIVE | Facility: OTHER | Age: 68
End: 2018-10-04

## 2018-10-04 NOTE — PROGRESS NOTES
10/4/2018  1st Attempt to complete follow-up for Outpatient Care Management; left message requesting return call.  JUANITA Zhu

## 2018-10-09 DIAGNOSIS — J41.8 MIXED SIMPLE AND MUCOPURULENT CHRONIC BRONCHITIS: ICD-10-CM

## 2018-10-09 RX ORDER — FLUTICASONE FUROATE AND VILANTEROL 100; 25 UG/1; UG/1
1 POWDER RESPIRATORY (INHALATION) DAILY
Qty: 30 EACH | Refills: 2 | Status: SHIPPED | OUTPATIENT
Start: 2018-10-09 | End: 2018-12-16 | Stop reason: SDUPTHER

## 2018-10-11 ENCOUNTER — OUTPATIENT CASE MANAGEMENT (OUTPATIENT)
Dept: ADMINISTRATIVE | Facility: OTHER | Age: 68
End: 2018-10-11

## 2018-10-11 NOTE — PROGRESS NOTES
10/11/2018  Second attempt to complete follow up for Outpatient Care Management, left message requesting a return call.  JUANITA Zhu

## 2018-10-12 ENCOUNTER — TELEPHONE (OUTPATIENT)
Dept: ADMINISTRATIVE | Facility: CLINIC | Age: 68
End: 2018-10-12

## 2018-10-12 ENCOUNTER — OUTPATIENT CASE MANAGEMENT (OUTPATIENT)
Dept: ADMINISTRATIVE | Facility: OTHER | Age: 68
End: 2018-10-12

## 2018-10-12 ENCOUNTER — OFFICE VISIT (OUTPATIENT)
Dept: CARDIOLOGY | Facility: CLINIC | Age: 68
End: 2018-10-12
Payer: MEDICARE

## 2018-10-12 VITALS
BODY MASS INDEX: 44.17 KG/M2 | WEIGHT: 291.44 LBS | DIASTOLIC BLOOD PRESSURE: 75 MMHG | HEART RATE: 77 BPM | HEIGHT: 68 IN | OXYGEN SATURATION: 92 % | SYSTOLIC BLOOD PRESSURE: 129 MMHG | RESPIRATION RATE: 16 BRPM

## 2018-10-12 DIAGNOSIS — I15.2 HYPERTENSION ASSOCIATED WITH DIABETES: Primary | ICD-10-CM

## 2018-10-12 DIAGNOSIS — I48.20 CHRONIC A-FIB: ICD-10-CM

## 2018-10-12 DIAGNOSIS — I73.9 PVD (PERIPHERAL VASCULAR DISEASE): ICD-10-CM

## 2018-10-12 DIAGNOSIS — E11.59 HYPERTENSION ASSOCIATED WITH DIABETES: Primary | ICD-10-CM

## 2018-10-12 DIAGNOSIS — I50.9 CONGESTIVE HEART FAILURE, UNSPECIFIED HF CHRONICITY, UNSPECIFIED HEART FAILURE TYPE: ICD-10-CM

## 2018-10-12 PROCEDURE — 3078F DIAST BP <80 MM HG: CPT | Mod: CPTII,,, | Performed by: INTERNAL MEDICINE

## 2018-10-12 PROCEDURE — 99215 OFFICE O/P EST HI 40 MIN: CPT | Mod: PBBFAC,PO | Performed by: INTERNAL MEDICINE

## 2018-10-12 PROCEDURE — 93000 ELECTROCARDIOGRAM COMPLETE: CPT | Mod: HCWC,S$GLB,, | Performed by: INTERNAL MEDICINE

## 2018-10-12 PROCEDURE — 99999 PR PBB SHADOW E&M-EST. PATIENT-LVL V: CPT | Mod: PBBFAC,,, | Performed by: INTERNAL MEDICINE

## 2018-10-12 PROCEDURE — 3044F HG A1C LEVEL LT 7.0%: CPT | Mod: CPTII,,, | Performed by: INTERNAL MEDICINE

## 2018-10-12 PROCEDURE — 1101F PT FALLS ASSESS-DOCD LE1/YR: CPT | Mod: CPTII,,, | Performed by: INTERNAL MEDICINE

## 2018-10-12 PROCEDURE — 3074F SYST BP LT 130 MM HG: CPT | Mod: CPTII,,, | Performed by: INTERNAL MEDICINE

## 2018-10-12 PROCEDURE — 99213 OFFICE O/P EST LOW 20 MIN: CPT | Mod: S$PBB,,, | Performed by: INTERNAL MEDICINE

## 2018-10-12 NOTE — PROGRESS NOTES
[facility]    CC:  Follow-up visit  Chief Complaint   Patient presents with    Follow-up     8 month f/u       Patient ID:Nelly Tian is a 67 y.o. female with a past medical history of atrial fibrillation, congestive heart failure, diabetes    HPI  sHe is doing well.  She denies any chest pains, shortness of breath, orthopnea, PND, syncope or presyncope.  She remains in chronic atrial fibrillation.    Past Medical History:   Diagnosis Date    *Atrial flutter     Angina pectoris 2017    Anxiety     Arthritis     Asthma     Atrial fibrillation     Back pain     Cataract     OD    CHF (congestive heart failure)     COPD (chronic obstructive pulmonary disease)     Depression     Diabetes mellitus     Emphysema of lung     Heart failure     Hepatomegaly 2/3/2016    Hernia     History of MI (myocardial infarction) 2016    Hypercapnic respiratory failure, chronic 2016    Hyperlipidemia     Hypertension     Iron deficiency anemia 2/3/2016    Myocardial infarction     Obesity     Peripheral vascular disease     Pneumonia     Polyneuropathy     Retinal detachment     OS    Septic shock 2017    Skin ulcer 3/18/2017    Tobacco dependence     Type II 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, not stated as uncontrolled(250.60)      Past Surgical History:   Procedure Laterality Date    BREAST BIOPSY Left 10 yrs ago    benign    CATARACT EXTRACTION Left     OS      SECTION      x2    EYE SURGERY      HYSTERECTOMY      partial    OOPHORECTOMY      one ovary conserved    RETINAL DETACHMENT SURGERY      buckle --OS    TONSILLECTOMY       Social History     Socioeconomic History    Marital status:      Spouse name: Not on file    Number of children: Not on file    Years of education: Not on file    Highest education level: Not on file   Social Needs    Financial resource strain: Not on file    Food  insecurity - worry: Not on file    Food insecurity - inability: Not on file    Transportation needs - medical: Not on file    Transportation needs - non-medical: Not on file   Occupational History    Not on file   Tobacco Use    Smoking status: Current Some Day Smoker     Packs/day: 0.25     Years: 50.00     Pack years: 12.50     Types: Cigarettes     Start date: 1/19/1968    Smokeless tobacco: Never Used   Substance and Sexual Activity    Alcohol use: No     Alcohol/week: 0.0 oz     Frequency: Never    Drug use: No    Sexual activity: Not Currently   Other Topics Concern    Not on file   Social History Narrative    Not on file     Family History   Problem Relation Age of Onset    Arthritis Father     Heart disease Father     Early death Sister 30        heart disease    Heart disease Sister 32        MI    Cancer Brother         Lung cancer    No Known Problems Daughter     Blindness Son         due to accident//    Arthritis Sister     Heart disease Sister     Crohn's disease Sister     Alcohol abuse Mother     Breast cancer Neg Hx     Glaucoma Neg Hx     Macular degeneration Neg Hx     Retinal detachment Neg Hx     Amblyopia Neg Hx     Diabetes Neg Hx     Cataracts Neg Hx     Hypertension Neg Hx     Strabismus Neg Hx     Stroke Neg Hx     Thyroid disease Neg Hx        Review of patient's allergies indicates:   Allergen Reactions    Dilaudid [hydromorphone] Anaphylaxis     Other reaction(s): Anaphylaxis  Other reaction(s): Unknown    Zyvox [linezolid in dextrose 5%] Shortness Of Breath          Medication List           Accurate as of 10/12/18 10:59 AM. If you have any questions, ask your nurse or doctor.               CHANGE how you take these medications    * furosemide 40 MG tablet  Commonly known as:  LASIX  TAKE 1 TABLET ONE TIME DAILY  FOR  FLUID  OVERLOAD  What changed:  See the new instructions.     * furosemide 40 MG tablet  Commonly known as:  LASIX  TAKE ONE TABLET BY  MOUTH TWICE DAILY FOR  FLUID  OVERLOAD  What changed:  Another medication with the same name was changed. Make sure you understand how and when to take each.         * This list has 2 medication(s) that are the same as other medications prescribed for you. Read the directions carefully, and ask your doctor or other care provider to review them with you.            CONTINUE taking these medications    * albuterol 90 mcg/actuation inhaler  Commonly known as:  VENTOLIN HFA  Inhale 2 puffs into the lungs every 6 (six) hours as needed. Rescue     * albuterol 0.63 mg/3 mL Nebu  Commonly known as:  ACCUNEB  INHALE THE CONTENTS OF 1 VIAL VIA NEBULIZER EVERY 6 HOURS AS NEEDED     apixaban 5 mg Tab  Commonly known as:  ELIQUIS  Take 1 tablet (5 mg total) by mouth 2 (two) times daily.     ascorbic acid (vitamin C) 500 MG tablet  Commonly known as:  VITAMIN C  Take 1 tablet (500 mg total) by mouth every evening.     atorvastatin 20 MG tablet  Commonly known as:  LIPITOR  TAKE 1 TABLET EVERY DAY     budesonide 0.5 mg/2 mL nebulizer solution  Commonly known as:  PULMICORT  INHALE THE CONTENTS OF 1 VIAL VIA NEBULIZER EVERY DAY     clonazePAM 1 MG tablet  Commonly known as:  KLONOPIN  1 tab po bid prn     fluticasone-vilanterol 100-25 mcg/dose diskus inhaler  Commonly known as:  BREO ELLIPTA  Inhale 1 puff into the lungs once daily.     gabapentin 800 MG tablet  Commonly known as:  NEURONTIN  TAKE ONE TABLET BY MOUTH THREE TIMES DAILY     HYDROcodone-acetaminophen  mg per tablet  Commonly known as:  NORCO  Take 1 tablet by mouth every 8 (eight) hours as needed for Pain (Do not take more than 3 a day. Do not mix with other narcotics.).     KLOR-CON M20 20 MEQ tablet  Generic drug:  potassium chloride SA  TAKE ONE TABLET BY MOUTH ONCE DAILY     levalbuterol 0.63 mg/3 mL nebulizer solution  Commonly known as:  XOPENEX  INHALE THE CONTENTS OF 1 VIAL BY NEBULIZATION 4 times  DAILY.    DX: J43.9     lisinopril 20 MG  tablet  Commonly known as:  PRINIVIL,ZESTRIL  TAKE 1 TABLET EVERY DAY     metFORMIN 500 MG tablet  Commonly known as:  GLUCOPHAGE  Take 1 tablet (500 mg total) by mouth 2 (two) times daily with meals.     metoprolol succinate 25 MG 24 hr tablet  Commonly known as:  TOPROL-XL  TAKE 1/2 TABLET EVERY DAY     multivitamin tablet  Commonly known as:  THERAGRAN  Take 1 tablet by mouth once daily.     nystatin-triamcinolone cream  Commonly known as:  MYCOLOG II  APPLY SPARINGLY TO AFFECTED AREA(S) 3 TIMES DAILY AS NEEDED     NYSTOP powder  Generic drug:  nystatin  APPLY  POWDER TOPICALLY TO AFFECTED AREA 4 TIMES DAILY     omeprazole 20 MG capsule  Commonly known as:  PRILOSEC  Take 1 capsule (20 mg total) by mouth once daily.     polyethylene glycol 17 gram/dose powder  Commonly known as:  GLYCOLAX     senna-docusate 8.6-50 mg 8.6-50 mg per tablet  Commonly known as:  PERICOLACE  Take 1 tablet by mouth 2 (two) times daily as needed for Constipation.     silver sulfADIAZINE 1% 1 % cream  Commonly known as:  SILVADENE  Apply topically once daily.     spironolactone 25 MG tablet  Commonly known as:  ALDACTONE  Take 1 tablet (25 mg total) by mouth once daily.     traZODone 150 MG tablet  Commonly known as:  DESYREL  TAKE 1 TABLET EVERY EVENING.         * This list has 2 medication(s) that are the same as other medications prescribed for you. Read the directions carefully, and ask your doctor or other care provider to review them with you.            STOP taking these medications    aspirin 81 MG EC tablet  Commonly known as:  ECOTRIN  Stopped by:  Killian Cook MD            Review of Systems  Constitution: Denies chills, fever, and sweats.  HENT: Denies headaches or blurry vision.  Cardiovascular: Denies chest pain or irregular heart beat.  Respiratory: Denies cough or shortness of breath.  Gastrointestinal: Denies abdominal pain, nausea, or vomiting.  Musculoskeletal: Denies muscle cramps.  Neurological: Denies dizziness  "or focal weakness.  Psychiatric/Behavioral: Normal mental status.  Hematologic/Lymphatic: Denies bleeding problem or easy bruising/bleeding.  Skin: Denies rash or suspicious lesions    Physical Examination  /75 (BP Location: Left arm, Patient Position: Sitting)   Pulse 77   Resp 16   Ht 5' 8" (1.727 m)   Wt 132.2 kg (291 lb 7.2 oz)   LMP  (LMP Unknown)   SpO2 (!) 92%   BMI 44.31 kg/m²     Constitutional: No acute distress, conversant  HEENT: Sclera anicteric, Pupils equal, round and reactive to light, extraocular motions intact, Oropharynx clear  Neck: No JVD, no carotid bruits  Cardiovascular: regular rate and rhythm, no murmur, rubs or gallops, normal S1/S2  Pulmonary: Clear to auscultation bilaterally  Abdominal: Abdomen soft, nontender, nondistended, positive bowel sounds  Extremities: No lower extremity edema,   Pulses:  Carotid pulses are 2+ on the right side, and 2+ on the left side.  Radial pulses are 2+ on the right side, and 2+ on the left side.   .    Skin: No ecchymosis, erythema, or ulcers  Psych: Alert and oriented x 3, appropriate affect  Neuro: CNII-XII intact, no focal deficits    Labs:  Most Recent Data  CBC:   Lab Results   Component Value Date    WBC 9.20 09/25/2018    HGB 9.6 (L) 09/25/2018    HCT 30.2 (L) 09/25/2018     09/25/2018    MCV 91 09/25/2018    RDW 16.1 (H) 09/25/2018     BMP:   Lab Results   Component Value Date     09/25/2018    K 4.6 09/25/2018     09/25/2018    CO2 30 (H) 09/25/2018    BUN 8 09/25/2018    CREATININE 0.6 09/25/2018     09/25/2018    CALCIUM 9.4 09/25/2018    MG 1.5 (L) 09/23/2018    PHOS 2.7 09/23/2018     LFTS;   Lab Results   Component Value Date    PROT 7.7 09/23/2018    ALBUMIN 3.3 (L) 09/23/2018    BILITOT 0.4 09/23/2018    AST 17 09/23/2018    ALKPHOS 94 09/23/2018    ALT 20 09/23/2018     COAGS:   Lab Results   Component Value Date    INR 1.1 09/23/2018     FLP:   Lab Results   Component Value Date    CHOL 136 " 06/13/2018    HDL 39 (L) 06/13/2018    LDLCALC 69.0 06/13/2018    TRIG 140 06/13/2018    CHOLHDL 28.7 06/13/2018     CARDIAC:   Lab Results   Component Value Date    TROPONINI 0.006 09/23/2018    BNP 81 09/23/2018       Imaging:    EKG:  Atrial fibrillation.  Controlled ventricular response  Echo:  CONCLUSIONS     1 - Low normal to mildly depressed left ventricular systolic function (EF 50-55%).     2 - The estimated PA systolic pressure is greater than 22 mmHg.     3 - Mild aortic regurgitation.     4 - Mild mitral regurgitation.     I have personally reviewed these images and echo data    Assessment/Plan:  Nelly was seen today for follow-up.    Diagnoses and all orders for this visit:    Hypertension associated with diabetes    Chronic a-fib  -     EKG 12-lead    PVD (peripheral vascular disease)    Congestive heart failure, unspecified HF chronicity, unspecified heart failure type       her hemoglobin count is low.  Hence I have stopped her aspirin.  She would continue on the Eliquis for the time being for stroke prophylaxis.  Continue on metoprolol and lisinopril for the time being.          Follow-up in about 1 year (around 10/12/2019).          Total duration of face to face visit time 15 minutes.  Total time spent counseling greater than fifty percent of total visit time.  Counseling included discussion regarding imaging findings, diagnosis, possibilities, treatment options, risks and benefits.  The patient had many questions regarding the options and long-term effect which were all answered to my best ability.      Killian Cook MD,MRCP,RPVI,FACC,FSCAI.  Interventional Cardiology   Phone [phone]

## 2018-10-12 NOTE — LETTER
October 12, 2018    Nelly Tian  [address] [facility] [place] [address]             [facility] [address] [place] [address] Dear Ms. HarmanNelly Tian:        I work with [facility] Outpatient Case Management Department. I have been unsuccessful at reaching you to follow-up to see how you have been doing. Please call me back at your earliest convenience to discuss your health care needs.      I can be reached at [phone] from 8:00AM to 4:30 PM on Monday thru Friday. [facility] is a program offered through [facility] where a nurse is available 24/7 to answer questions or provide medical advice, their number is [phone].    Thanks,      JUANITA Sofia  Outpatient Complex Care Management

## 2018-10-12 NOTE — PROGRESS NOTES
Home Health Recert with [facility]. Dr. Constantine Bird. SN and PT services.

## 2018-10-12 NOTE — PROGRESS NOTES
10/12/2018  3rd Attempt to complete follow up for Outpatient Care Management, I will mail a letter with my contact information requesting a return call.  JUANITA Zhu

## 2018-10-25 ENCOUNTER — TELEPHONE (OUTPATIENT)
Dept: HEMATOLOGY/ONCOLOGY | Facility: CLINIC | Age: 68
End: 2018-10-25

## 2018-10-25 NOTE — TELEPHONE ENCOUNTER
----- Message from Abi Wang sent at 10/25/2018 10:20 AM CDT -----  Contact: self   Patient miss call from your office please call back at [phone] (cpuw)

## 2018-10-25 NOTE — TELEPHONE ENCOUNTER
----- Message from Sara Hodge sent at 10/25/2018 10:01 AM CDT -----  Contact: Self  Patient needs to reschedule her sai she had to cancel on 11/23  Patient wants to know if she can have 11/23 appt back     Please call back [phone] (home)

## 2018-10-25 NOTE — TELEPHONE ENCOUNTER
Called pt and left message instructing pt to call back at her convenience to schedule her apt. Instructed pt to call the clinic at .

## 2018-10-26 ENCOUNTER — DOCUMENTATION ONLY (OUTPATIENT)
Dept: FAMILY MEDICINE | Facility: CLINIC | Age: 68
End: 2018-10-26

## 2018-10-29 ENCOUNTER — OFFICE VISIT (OUTPATIENT)
Dept: FAMILY MEDICINE | Facility: CLINIC | Age: 68
End: 2018-10-29
Payer: MEDICARE

## 2018-10-29 VITALS
BODY MASS INDEX: 43.84 KG/M2 | HEIGHT: 68 IN | OXYGEN SATURATION: 94 % | HEART RATE: 82 BPM | TEMPERATURE: 98 F | RESPIRATION RATE: 24 BRPM | WEIGHT: 289.25 LBS | SYSTOLIC BLOOD PRESSURE: 124 MMHG | DIASTOLIC BLOOD PRESSURE: 62 MMHG

## 2018-10-29 DIAGNOSIS — N63.23 MASS OF LOWER OUTER QUADRANT OF LEFT BREAST: Primary | ICD-10-CM

## 2018-10-29 DIAGNOSIS — F17.200 SMOKER: ICD-10-CM

## 2018-10-29 DIAGNOSIS — R05.9 COUGH: ICD-10-CM

## 2018-10-29 PROCEDURE — 99213 OFFICE O/P EST LOW 20 MIN: CPT | Mod: S$GLB,,, | Performed by: NURSE PRACTITIONER

## 2018-10-29 RX ORDER — OMEGA-3-ACID ETHYL ESTERS 1 G/1
1 CAPSULE, LIQUID FILLED ORAL 2 TIMES DAILY
COMMUNITY
Start: 2018-10-04 | End: 2021-10-01 | Stop reason: ALTCHOICE

## 2018-10-29 RX ORDER — FLUCONAZOLE 100 MG/1
TABLET ORAL
COMMUNITY
Start: 2018-09-26 | End: 2018-11-23

## 2018-10-29 RX ORDER — VARENICLINE TARTRATE 0.5 (11)-1
KIT ORAL
Qty: 42 TABLET | Refills: 0 | Status: SHIPPED | OUTPATIENT
Start: 2018-10-29 | End: 2018-12-06

## 2018-10-29 RX ORDER — DICLOFENAC SODIUM 16.05 MG/ML
SOLUTION TOPICAL
COMMUNITY
Start: 2018-10-04 | End: 2019-04-25

## 2018-10-29 NOTE — PROGRESS NOTES
Subjective:       Patient ID: Nelly Tian is a 67 y.o. female.    Chief Complaint: Painful lump in left breast  Noticed a lump in the left breast about 2 weeks ago, it is tender intermittently. She denies nipple discharge. She is a coffee drinker. She drinks 1 cup of coffee in the morning and 1 Coke a day. She had a cyst in her breast in the past and it was removed, she is unsure of the date, it was in the same breast, near the same area.     Has a cough, is coughing phlegm up for 2 weeks. She denies more phlegm than normal, denies any blood. Has copd, is still smoking.   Is agreeable to trying chantix to quit smoking. Denies any history of depression.   HPI  Review of Systems   Respiratory: Positive for cough. Negative for shortness of breath.        Objective:     Mammo Digital Screening Bilat with Tomosynthesis_CAD                                        Result:  Mammo Digital Screening Bilat with Tomosynthesis_CAD     History:  Patient is 66 y.o. and is seen for other screening mammogram.           Films Compared:  Compared to: 07/15/2015 Mammo Digital Screening Bilat with Tomosynthesis_CAD, and 08/12/2013 Mammo Digital Diagnostic Bilat with CAD     Findings:  Computer-aided detection was utilized in the interpretation of this examination. This procedure was performed using tomosynthesis.       The breasts are almost entirely fatty. There is no evidence of suspicious masses, microcalcifications or architectural distortion.     Impression:  No mammographic evidence of malignancy.     BI-RADS Category 1: Negative     Recommendation:       - Routine Screening Mammogram in 1 year     The patient's estimated lifetime risk of breast cancer (to age 85) based on Tyrer-Cuzick - 7 risk assessment model is: Tyrer-Cuzick: 7.84 %. According to the [employer] Society,  patients with a lifetime breast cancer risk of 20% or higher might benefit from supplemental screening tests.      Assessment     Overall    1 -  Negative    Breast Density      Overall   Breast Composition a - Almost entirely fatty          Last Resulted: 08/25/17 10:40 Order Details View Encounter Lab and Collection Details Routing Result History                 Physical Exam   Constitutional: She is oriented to person, place, and time. She appears well-developed and well-nourished. No distress.   HENT:   Head: Normocephalic and atraumatic.   Eyes: Conjunctivae are normal. Right eye exhibits no discharge. Left eye exhibits no discharge. No scleral icterus.   Cardiovascular: Normal rate. Exam reveals no gallop and no friction rub.   Murmur heard.  Irregular heart rhythm.    Pulmonary/Chest: No respiratory distress. She has no wheezes. She has no rales.   Respirations 24, has decreased breath sounds throughout right lung. Left breast thickening felt at 6:30 left lower portion of breast. Not moveable.    Neurological: She is alert and oriented to person, place, and time.   Skin: Skin is warm and dry. She is not diaphoretic.   Psychiatric: She has a normal mood and affect. Her behavior is normal.   Nursing note and vitals reviewed.      Assessment:       1. Mass of lower outer quadrant of left breast     2. Cough    3. Smoker        Plan:       Mass of lower outer quadrant of left breast   -     Mammo Digital Diagnostic Left w/ Artemio; Future; Expected date: 10/29/2018    Cough  -     X-Ray Chest PA And Lateral; Future; Expected date: 10/29/2018    Smoker  -     varenicline (CHANTIX MURTAZA) 0.5 mg (11)- 1 mg (42) tablet; Take one 0.5mg tablet by mouth once daily for 3 days, then increase to one 0.5mg tablet twice daily for 3 days, then increase to one 1mg tablet twice daily.  Dispense: 42 tablet; Refill: 0      Quit smoking   We will notify you of any problems with mammogram or chest x ray. If you get short of breath, run a fever, or start coughing up more sputum than normal, return to clinic or go to ER.  1 month

## 2018-10-29 NOTE — PATIENT INSTRUCTIONS
Quit smoking   We will notify you of any problems with mammogram or chest x ray. If you get short of breath, run a fever, or start coughing up more sputum than normal, return to clinic or go to ER.

## 2018-10-30 DIAGNOSIS — M43.12 SPONDYLOLISTHESIS OF CERVICAL REGION: ICD-10-CM

## 2018-10-30 DIAGNOSIS — M51.36 LUMBAR DEGENERATIVE DISC DISEASE: ICD-10-CM

## 2018-10-30 NOTE — TELEPHONE ENCOUNTER
Patient requesting a refill of Hydrocodone.  LR--9-28-18  LOV--10-29-18  FOV--11-26-18  Urine Toxicology--10-25-17  Pain Contract--10-25-17

## 2018-11-01 RX ORDER — BUDESONIDE 0.5 MG/2ML
0.5 INHALANT ORAL DAILY
Qty: 180 ML | Refills: 4 | OUTPATIENT
Start: 2018-11-01

## 2018-11-01 NOTE — TELEPHONE ENCOUNTER
Patient requesting a refill of Budesonide Nebulizer. Please advise.   LR--6-25-18  LOV--10-29-18  FOV--11-26-18

## 2018-11-01 NOTE — TELEPHONE ENCOUNTER
Please see attached message from Mrs. Tamy Garcia NP. Call placed to patient who confirms she is taking both Breo Inhaler and Budesonide Nebulizer Treatments. Please be advised.

## 2018-11-02 RX ORDER — TRAZODONE HYDROCHLORIDE 150 MG/1
TABLET ORAL
Qty: 90 TABLET | Refills: 3 | Status: SHIPPED | OUTPATIENT
Start: 2018-11-02 | End: 2018-12-06

## 2018-11-03 RX ORDER — HYDROCODONE BITARTRATE AND ACETAMINOPHEN 10; 325 MG/1; MG/1
1 TABLET ORAL EVERY 8 HOURS PRN
Qty: 90 TABLET | Refills: 0 | Status: SHIPPED | OUTPATIENT
Start: 2018-11-03 | End: 2018-11-30 | Stop reason: SDUPTHER

## 2018-11-09 ENCOUNTER — OFFICE VISIT (OUTPATIENT)
Dept: FAMILY MEDICINE | Facility: CLINIC | Age: 68
End: 2018-11-09
Payer: MEDICARE

## 2018-11-09 ENCOUNTER — HOSPITAL ENCOUNTER (OUTPATIENT)
Dept: RADIOLOGY | Facility: CLINIC | Age: 68
Discharge: HOME | End: 2018-11-09
Attending: NURSE PRACTITIONER
Payer: MEDICARE

## 2018-11-09 ENCOUNTER — TELEPHONE (OUTPATIENT)
Dept: ADMINISTRATIVE | Facility: CLINIC | Age: 68
End: 2018-11-09

## 2018-11-09 VITALS
HEIGHT: 68 IN | WEIGHT: 293 LBS | OXYGEN SATURATION: 92 % | BODY MASS INDEX: 44.41 KG/M2 | HEART RATE: 75 BPM | DIASTOLIC BLOOD PRESSURE: 60 MMHG | TEMPERATURE: 98 F | SYSTOLIC BLOOD PRESSURE: 115 MMHG

## 2018-11-09 DIAGNOSIS — R05.9 COUGH: ICD-10-CM

## 2018-11-09 DIAGNOSIS — J44.1 COPD EXACERBATION: Primary | ICD-10-CM

## 2018-11-09 DIAGNOSIS — K11.20 PAROTITIS: ICD-10-CM

## 2018-11-09 DIAGNOSIS — E11.3293 CONTROLLED TYPE 2 DIABETES MELLITUS WITH BOTH EYES AFFECTED BY MILD NONPROLIFERATIVE RETINOPATHY WITHOUT MACULAR EDEMA, WITHOUT LONG-TERM CURRENT USE OF INSULIN: ICD-10-CM

## 2018-11-09 DIAGNOSIS — E66.01 MORBID OBESITY WITH BMI OF 40.0-44.9, ADULT: ICD-10-CM

## 2018-11-09 PROBLEM — L03.319 CELLULITIS OF TRUNK: Status: RESOLVED | Noted: 2018-08-22 | Resolved: 2018-11-09

## 2018-11-09 PROBLEM — A41.9 SEPSIS: Status: RESOLVED | Noted: 2018-09-23 | Resolved: 2018-11-09

## 2018-11-09 PROCEDURE — 3044F HG A1C LEVEL LT 7.0%: CPT | Mod: CPTII,HCWC,S$GLB, | Performed by: NURSE PRACTITIONER

## 2018-11-09 PROCEDURE — 3074F SYST BP LT 130 MM HG: CPT | Mod: CPTII,HCWC,S$GLB, | Performed by: NURSE PRACTITIONER

## 2018-11-09 PROCEDURE — 99999 PR PBB SHADOW E&M-EST. PATIENT-LVL V: CPT | Mod: PBBFAC,HCWC,, | Performed by: NURSE PRACTITIONER

## 2018-11-09 PROCEDURE — 1101F PT FALLS ASSESS-DOCD LE1/YR: CPT | Mod: CPTII,HCWC,S$GLB, | Performed by: NURSE PRACTITIONER

## 2018-11-09 PROCEDURE — 99214 OFFICE O/P EST MOD 30 MIN: CPT | Mod: HCWC,S$GLB,, | Performed by: NURSE PRACTITIONER

## 2018-11-09 PROCEDURE — 3078F DIAST BP <80 MM HG: CPT | Mod: CPTII,HCWC,S$GLB, | Performed by: NURSE PRACTITIONER

## 2018-11-09 PROCEDURE — 71046 X-RAY EXAM CHEST 2 VIEWS: CPT | Mod: 26,HCWC,, | Performed by: RADIOLOGY

## 2018-11-09 PROCEDURE — 71046 X-RAY EXAM CHEST 2 VIEWS: CPT | Mod: TC,FY,HCWC,PO

## 2018-11-09 RX ORDER — AMOXICILLIN AND CLAVULANATE POTASSIUM 562.5; 437.5; 62.5 MG/1; MG/1; MG/1
2 TABLET, FILM COATED, EXTENDED RELEASE ORAL 2 TIMES DAILY
Qty: 28 TABLET | Refills: 0 | Status: SHIPPED | OUTPATIENT
Start: 2018-11-09 | End: 2018-11-16

## 2018-11-09 NOTE — PROGRESS NOTES
Subjective:       Patient ID: Nelly Tian is a 67 y.o. female.    Chief Complaint: Cough    Ms. Tian presents to the clinic today for cough which began three weeks ago.  She has history of COPD.  Reports a little more sputum than usual, green in color.  She has not been very short of breath or wheezing a lot.  She also noticed a lump to right side of face a few days ago.  This is non painful even when eating.  She did get her MMR as a child.  She is diabetic, controlled.  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        Review of Systems   Constitutional: Negative for chills and fever.   HENT: Negative for congestion, ear pain, sinus pressure and sore throat.         Lump right side of face   Respiratory: Positive for cough. Negative for shortness of breath and wheezing.    Cardiovascular: Negative for chest pain, palpitations and leg swelling.   Gastrointestinal: Negative for abdominal pain, constipation and diarrhea.   Skin: Positive for wound.       Objective:      Physical Exam   Constitutional: She is oriented to person, place, and time. She appears well-nourished. No distress.   HENT:   Head: Normocephalic and atraumatic.       Right Ear: External ear normal.   Left Ear: External ear normal.   Mouth/Throat: Oropharynx is clear and moist. No oropharyngeal exudate.   Eyes: Pupils are equal, round, and reactive to light. Right eye exhibits no discharge. Left eye exhibits no discharge.   Neck: Neck supple. No thyromegaly present.   Cardiovascular: Normal rate and regular rhythm. Exam reveals no gallop and no friction rub.   No murmur heard.  Pulmonary/Chest: Effort normal. No respiratory distress. She has decreased breath sounds. She has no wheezes. She has no rhonchi. She has no rales.   Abdominal: Soft. She exhibits no distension. There is no tenderness.   Lymphadenopathy:     She has no cervical adenopathy.   Neurological: She is alert and oriented to person, place, and time.  Coordination normal.   Skin: Skin is warm and dry.   Psychiatric: She has a normal mood and affect. Her behavior is normal. Thought content normal.   Vitals reviewed.          Current Outpatient Medications:     albuterol (ACCUNEB) 0.63 mg/3 mL Nebu, INHALE THE CONTENTS OF 1 VIAL VIA NEBULIZER EVERY 6 HOURS AS NEEDED, Disp: 75 mL, Rfl: 11    albuterol (VENTOLIN HFA) 90 mcg/actuation inhaler, Inhale 2 puffs into the lungs every 6 (six) hours as needed. Rescue, Disp: 3 Inhaler, Rfl: 3    amoxicillin-clavulanate 1,000-62.5 mg (AUGMENTIN XR) 1,000-62.5 mg per tablet, Take 2 tablets by mouth 2 (two) times daily. for 7 days, Disp: 28 tablet, Rfl: 0    apixaban (ELIQUIS) 5 mg Tab, Take 1 tablet (5 mg total) by mouth 2 (two) times daily., Disp: 90 tablet, Rfl: 3    ascorbic acid, vitamin C, (VITAMIN C) 500 MG tablet, Take 1 tablet (500 mg total) by mouth every evening., Disp: , Rfl:     atorvastatin (LIPITOR) 20 MG tablet, TAKE 1 TABLET EVERY DAY, Disp: 90 tablet, Rfl: 3    budesonide (PULMICORT) 0.5 mg/2 mL nebulizer solution, INHALE THE CONTENTS OF 1 VIAL VIA NEBULIZER EVERY DAY, Disp: 180 mL, Rfl: 4    clonazePAM (KLONOPIN) 1 MG tablet, 1 tab po bid prn, Disp: 60 tablet, Rfl: 2    diclofenac sodium 1.5 % Drop, , Disp: , Rfl:     fluconazole (DIFLUCAN) 100 MG tablet, , Disp: , Rfl:     fluticasone-vilanterol (BREO ELLIPTA) 100-25 mcg/dose diskus inhaler, Inhale 1 puff into the lungs once daily., Disp: 30 each, Rfl: 2    furosemide (LASIX) 40 MG tablet, TAKE 1 TABLET ONE TIME DAILY  FOR  FLUID  OVERLOAD (Patient taking differently: TAKE 1 TABLET twice DAILY  FOR  FLUID  OVERLOAD), Disp: 90 tablet, Rfl: 3    furosemide (LASIX) 40 MG tablet, TAKE ONE TABLET BY MOUTH TWICE DAILY FOR  FLUID  OVERLOAD, Disp: 60 tablet, Rfl: 5    gabapentin (NEURONTIN) 800 MG tablet, TAKE ONE TABLET BY MOUTH THREE TIMES DAILY, Disp: 270 tablet, Rfl: 3    HYDROcodone-acetaminophen (NORCO)  mg per tablet, Take 1 tablet by  mouth every 8 (eight) hours as needed for Pain (Do not take more than 3 a day. Do not mix with other narcotics.)., Disp: 90 tablet, Rfl: 0    KLOR-CON M20 20 mEq tablet, TAKE ONE TABLET BY MOUTH ONCE DAILY, Disp: 30 tablet, Rfl: 6    levalbuterol (XOPENEX) 0.63 mg/3 mL nebulizer solution, INHALE THE CONTENTS OF 1 VIAL BY NEBULIZATION 4 times  DAILY.    DX: J43.9, Disp: 792 mL, Rfl: 3    lisinopril (PRINIVIL,ZESTRIL) 20 MG tablet, TAKE 1 TABLET EVERY DAY, Disp: 90 tablet, Rfl: 1    metFORMIN (GLUCOPHAGE) 500 MG tablet, Take 1 tablet (500 mg total) by mouth 2 (two) times daily with meals., Disp: 180 tablet, Rfl: 3    metoprolol succinate (TOPROL-XL) 25 MG 24 hr tablet, TAKE 1/2 TABLET EVERY DAY, Disp: 45 tablet, Rfl: 3    multivitamin (THERAGRAN) tablet, Take 1 tablet by mouth once daily., Disp: , Rfl:     nystatin-triamcinolone (MYCOLOG II) cream, APPLY SPARINGLY TO AFFECTED AREA(S) 3 TIMES DAILY AS NEEDED, Disp: 30 g, Rfl: 1    NYSTOP powder, APPLY  POWDER TOPICALLY TO AFFECTED AREA 4 TIMES DAILY, Disp: 120 g, Rfl: 3    omega-3 acid ethyl esters (LOVAZA) 1 gram capsule, , Disp: , Rfl:     omeprazole (PRILOSEC) 20 MG capsule, Take 1 capsule (20 mg total) by mouth once daily., Disp: 30 capsule, Rfl: 11    polyethylene glycol (GLYCOLAX) 17 gram/dose powder, , Disp: , Rfl:     senna-docusate 8.6-50 mg (PERICOLACE) 8.6-50 mg per tablet, Take 1 tablet by mouth 2 (two) times daily as needed for Constipation., Disp: , Rfl:     silver sulfADIAZINE 1% (SILVADENE) 1 % cream, Apply topically once daily., Disp: 50 g, Rfl: 0    spironolactone (ALDACTONE) 25 MG tablet, Take 1 tablet (25 mg total) by mouth once daily., Disp: 30 tablet, Rfl: 11    traZODone (DESYREL) 150 MG tablet, TAKE 1 TABLET EVERY EVENING., Disp: 90 tablet, Rfl: 3    varenicline (CHANTIX MURTAZA) 0.5 mg (11)- 1 mg (42) tablet, Take one 0.5mg tablet by mouth once daily for 3 days, then increase to one 0.5mg tablet twice daily for 3 days, then increase  to one 1mg tablet twice daily., Disp: 42 tablet, Rfl: 0  Assessment:       1. COPD exacerbation    2. Parotitis    3. 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 Morbid obesity with BMI of 40.0-44.9, adult        Plan:       Nelly was seen today for cough.    Diagnoses and all orders for this visit:    COPD exacerbation  Cont nebulizer  -     X-Ray Chest PA And Lateral; Future  -     amoxicillin-clavulanate 1,000-62.5 mg (AUGMENTIN XR) 1,000-62.5 mg per tablet; Take 2 tablets by mouth 2 (two) times daily. for 7 days    Parotitis  Sugar free sour candies.  -     amoxicillin-clavulanate 1,000-62.5 mg (AUGMENTIN XR) 1,000-62.5 mg per tablet; Take 2 tablets by mouth 2 (two) times daily. 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    Morbid obesity with BMI of 40.0-44.9, adult  Patient readiness: acceptance and barriers:none    During the course of the visit the patient was educated and counseled about the following:     Diabetes:  Discussed general issues about diabetes pathophysiology and management.  Obesity:   General weight loss/lifestyle modification strategies discussed (elicit support from others; identify saboteurs; non-food rewards, etc).    Goals: Diabetes: Maintain Hemoglobin A1C below 7 and Obesity: Reduce calorie intake and BMI    Did patient meet goals/outcomes: Yes    The following self management tools provided: declined    Patient Instructions (the written plan) was given to the patient/family.     Time spent with patient: 15 minutes    Barriers to medications present (no )    Adverse reactions to current medications (no)    Over the counter medications reviewed (Yes)              RTC 1 week.

## 2018-11-13 ENCOUNTER — HOSPITAL ENCOUNTER (OUTPATIENT)
Dept: RADIOLOGY | Facility: HOSPITAL | Age: 68
Discharge: HOME | End: 2018-11-13
Attending: NURSE PRACTITIONER
Payer: MEDICARE

## 2018-11-13 DIAGNOSIS — N63.23 MASS OF LOWER OUTER QUADRANT OF LEFT BREAST: ICD-10-CM

## 2018-11-13 DIAGNOSIS — N63.0 LUMP, BREAST: ICD-10-CM

## 2018-11-13 PROCEDURE — 77062 BREAST TOMOSYNTHESIS BI: CPT | Mod: TC,HCWC

## 2018-11-13 PROCEDURE — 76642 ULTRASOUND BREAST LIMITED: CPT | Mod: TC,HCWC,LT

## 2018-11-13 PROCEDURE — 77066 DX MAMMO INCL CAD BI: CPT | Mod: 26,HCWC,, | Performed by: RADIOLOGY

## 2018-11-13 PROCEDURE — 76642 ULTRASOUND BREAST LIMITED: CPT | Mod: 26,HCWC,LT, | Performed by: RADIOLOGY

## 2018-11-13 PROCEDURE — 77062 BREAST TOMOSYNTHESIS BI: CPT | Mod: 26,HCWC,, | Performed by: RADIOLOGY

## 2018-11-19 RX ORDER — NYSTATIN 100000 [USP'U]/G
POWDER TOPICAL 2 TIMES DAILY
Qty: 120 G | Refills: 3 | Status: SHIPPED | OUTPATIENT
Start: 2018-11-19 | End: 2018-12-10 | Stop reason: SDUPTHER

## 2018-11-20 ENCOUNTER — LAB VISIT (OUTPATIENT)
Dept: LAB | Facility: HOSPITAL | Age: 68
End: 2018-11-20
Attending: INTERNAL MEDICINE
Payer: MEDICARE

## 2018-11-20 DIAGNOSIS — Z86.2 HISTORY OF ANEMIA: ICD-10-CM

## 2018-11-20 LAB
BASOPHILS # BLD AUTO: 0 K/UL
BASOPHILS NFR BLD: 0.4 %
DIFFERENTIAL METHOD: ABNORMAL
EOSINOPHIL # BLD AUTO: 0.1 K/UL
EOSINOPHIL NFR BLD: 1.6 %
ERYTHROCYTE [DISTWIDTH] IN BLOOD BY AUTOMATED COUNT: 16.5 %
HCT VFR BLD AUTO: 37.4 %
HGB BLD-MCNC: 11.7 G/DL
IRON SERPL-MCNC: 33 UG/DL
LYMPHOCYTES # BLD AUTO: 1.2 K/UL
LYMPHOCYTES NFR BLD: 16.2 %
MCH RBC QN AUTO: 27.2 PG
MCHC RBC AUTO-ENTMCNC: 31.2 G/DL
MCV RBC AUTO: 87 FL
MONOCYTES # BLD AUTO: 0.2 K/UL
MONOCYTES NFR BLD: 2.1 %
NEUTROPHILS # BLD AUTO: 6 K/UL
NEUTROPHILS NFR BLD: 79.7 %
PLATELET # BLD AUTO: 219 K/UL
PMV BLD AUTO: 9 FL
RBC # BLD AUTO: 4.29 M/UL
SATURATED IRON: 7 %
TOTAL IRON BINDING CAPACITY: 445 UG/DL
TRANSFERRIN SERPL-MCNC: 301 MG/DL
WBC # BLD AUTO: 7.6 K/UL

## 2018-11-20 PROCEDURE — 85025 COMPLETE CBC W/AUTO DIFF WBC: CPT

## 2018-11-20 PROCEDURE — 36415 COLL VENOUS BLD VENIPUNCTURE: CPT

## 2018-11-20 PROCEDURE — 83540 ASSAY OF IRON: CPT

## 2018-11-21 ENCOUNTER — TELEPHONE (OUTPATIENT)
Dept: ADMINISTRATIVE | Facility: HOSPITAL | Age: 68
End: 2018-11-21

## 2018-11-23 ENCOUNTER — OFFICE VISIT (OUTPATIENT)
Dept: HEMATOLOGY/ONCOLOGY | Facility: CLINIC | Age: 68
End: 2018-11-23
Payer: MEDICARE

## 2018-11-23 VITALS
HEIGHT: 68 IN | RESPIRATION RATE: 16 BRPM | TEMPERATURE: 97 F | OXYGEN SATURATION: 96 % | SYSTOLIC BLOOD PRESSURE: 95 MMHG | HEART RATE: 78 BPM | DIASTOLIC BLOOD PRESSURE: 50 MMHG | WEIGHT: 287.94 LBS | BODY MASS INDEX: 43.64 KG/M2

## 2018-11-23 DIAGNOSIS — J32.9 SINUSITIS, UNSPECIFIED CHRONICITY, UNSPECIFIED LOCATION: ICD-10-CM

## 2018-11-23 DIAGNOSIS — R06.2 WHEEZING: ICD-10-CM

## 2018-11-23 DIAGNOSIS — R06.02 SOB (SHORTNESS OF BREATH): ICD-10-CM

## 2018-11-23 DIAGNOSIS — Z86.39 HISTORY OF IRON DEFICIENCY: ICD-10-CM

## 2018-11-23 DIAGNOSIS — D64.9 ANEMIA, UNSPECIFIED TYPE: ICD-10-CM

## 2018-11-23 DIAGNOSIS — J40 BRONCHITIS: Primary | ICD-10-CM

## 2018-11-23 PROCEDURE — 3074F SYST BP LT 130 MM HG: CPT | Mod: CPTII,S$GLB,, | Performed by: INTERNAL MEDICINE

## 2018-11-23 PROCEDURE — 1101F PT FALLS ASSESS-DOCD LE1/YR: CPT | Mod: CPTII,S$GLB,, | Performed by: INTERNAL MEDICINE

## 2018-11-23 PROCEDURE — 3078F DIAST BP <80 MM HG: CPT | Mod: CPTII,S$GLB,, | Performed by: INTERNAL MEDICINE

## 2018-11-23 PROCEDURE — 99999 PR PBB SHADOW E&M-EST. PATIENT-LVL III: CPT | Mod: PBBFAC,,, | Performed by: INTERNAL MEDICINE

## 2018-11-23 PROCEDURE — 99215 OFFICE O/P EST HI 40 MIN: CPT | Mod: S$GLB,,, | Performed by: INTERNAL MEDICINE

## 2018-11-23 RX ORDER — LEVOFLOXACIN 500 MG/1
500 TABLET, FILM COATED ORAL DAILY
Qty: 7 TABLET | Refills: 0 | Status: SHIPPED | OUTPATIENT
Start: 2018-11-23 | End: 2018-12-06

## 2018-11-23 RX ORDER — METHYLPREDNISOLONE 4 MG/1
TABLET ORAL
Qty: 1 PACKAGE | Refills: 0 | Status: SHIPPED | OUTPATIENT
Start: 2018-11-23 | End: 2018-12-06 | Stop reason: SDUPTHER

## 2018-11-23 NOTE — PROGRESS NOTES
FOLLOWUP    CHIEF COMPLAINT:I have a chest cold    Ms. Tian is a 67-year-old female who has a history of progressive anemia after    IV iron.  She is continuing to see the wound doctor DR Conroy for ulcers on her left hip   Here today for routine labs for anemia. SHe has been coughing up green phlegm for over a week post amoxil. She is tired and feels sob, no hemoptysis, SHe is wheezing    She is tolerating Glucophage for diabetes as well as Lexapro for depression and Zestril   for hypertension  Trazodone helping her sleep    Past Medical History:   Diagnosis Date    *Atrial flutter     Angina pectoris 9/18/2017    Anxiety     Arthritis     Asthma     Atrial fibrillation     Back pain     Cataract     OD    CHF (congestive heart failure)     COPD (chronic obstructive pulmonary disease)     Depression     Diabetes mellitus     Emphysema of lung     Heart failure     Hepatomegaly 2/3/2016    Hernia     History of MI (myocardial infarction) 1/19/2016    Hypercapnic respiratory failure, chronic 11/16/2016    Hyperlipidemia     Hypertension     Iron deficiency anemia 2/3/2016    Myocardial infarction     Obesity     Peripheral vascular disease     Pneumonia     Polyneuropathy     Retinal detachment     OS    Septic shock 4/23/2017    Skin ulcer 3/18/2017    Tobacco dependence     Type II 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, not stated as uncontrolled(250.60)          Current Outpatient Medications:     albuterol (ACCUNEB) 0.63 mg/3 mL Nebu, INHALE THE CONTENTS OF 1 VIAL VIA NEBULIZER EVERY 6 HOURS AS NEEDED, Disp: 75 mL, Rfl: 11    albuterol (VENTOLIN HFA) 90 mcg/actuation inhaler, Inhale 2 puffs into the lungs every 6 (six) hours as needed. Rescue, Disp: 3 Inhaler, Rfl: 3    apixaban (ELIQUIS) 5 mg Tab, Take 1 tablet (5 mg total) by mouth 2 (two) times daily., Disp: 90 tablet, Rfl: 3    ascorbic acid, vitamin C, (VITAMIN C) 500 MG tablet, Take 1  tablet (500 mg total) by mouth every evening., Disp: , Rfl:     atorvastatin (LIPITOR) 20 MG tablet, TAKE 1 TABLET EVERY DAY, Disp: 90 tablet, Rfl: 3    budesonide (PULMICORT) 0.5 mg/2 mL nebulizer solution, INHALE THE CONTENTS OF 1 VIAL VIA NEBULIZER EVERY DAY, Disp: 180 mL, Rfl: 4    clonazePAM (KLONOPIN) 1 MG tablet, 1 tab po bid prn, Disp: 60 tablet, Rfl: 2    diclofenac sodium 1.5 % Drop, , Disp: , Rfl:     fluconazole (DIFLUCAN) 100 MG tablet, , Disp: , Rfl:     fluticasone-vilanterol (BREO ELLIPTA) 100-25 mcg/dose diskus inhaler, Inhale 1 puff into the lungs once daily., Disp: 30 each, Rfl: 2    furosemide (LASIX) 40 MG tablet, TAKE 1 TABLET ONE TIME DAILY  FOR  FLUID  OVERLOAD (Patient taking differently: TAKE 1 TABLET twice DAILY  FOR  FLUID  OVERLOAD), Disp: 90 tablet, Rfl: 3    furosemide (LASIX) 40 MG tablet, TAKE ONE TABLET BY MOUTH TWICE DAILY FOR  FLUID  OVERLOAD, Disp: 60 tablet, Rfl: 5    gabapentin (NEURONTIN) 800 MG tablet, TAKE ONE TABLET BY MOUTH THREE TIMES DAILY, Disp: 270 tablet, Rfl: 3    HYDROcodone-acetaminophen (NORCO)  mg per tablet, Take 1 tablet by mouth every 8 (eight) hours as needed for Pain (Do not take more than 3 a day. Do not mix with other narcotics.)., Disp: 90 tablet, Rfl: 0    KLOR-CON M20 20 mEq tablet, TAKE ONE TABLET BY MOUTH ONCE DAILY, Disp: 30 tablet, Rfl: 6    levalbuterol (XOPENEX) 0.63 mg/3 mL nebulizer solution, INHALE THE CONTENTS OF 1 VIAL BY NEBULIZATION 4 times  DAILY.    DX: J43.9, Disp: 792 mL, Rfl: 3    lisinopril (PRINIVIL,ZESTRIL) 20 MG tablet, TAKE 1 TABLET EVERY DAY, Disp: 90 tablet, Rfl: 1    metFORMIN (GLUCOPHAGE) 500 MG tablet, Take 1 tablet (500 mg total) by mouth 2 (two) times daily with meals., Disp: 180 tablet, Rfl: 3    metoprolol succinate (TOPROL-XL) 25 MG 24 hr tablet, TAKE 1/2 TABLET EVERY DAY, Disp: 45 tablet, Rfl: 3    multivitamin (THERAGRAN) tablet, Take 1 tablet by mouth once daily., Disp: , Rfl:     nystatin  (NYSTOP) powder, Apply topically 2 (two) times daily., Disp: 120 g, Rfl: 3    nystatin-triamcinolone (MYCOLOG II) cream, APPLY SPARINGLY TO AFFECTED AREA(S) 3 TIMES DAILY AS NEEDED, Disp: 30 g, Rfl: 1    omega-3 acid ethyl esters (LOVAZA) 1 gram capsule, , Disp: , Rfl:     omeprazole (PRILOSEC) 20 MG capsule, Take 1 capsule (20 mg total) by mouth once daily., Disp: 30 capsule, Rfl: 11    polyethylene glycol (GLYCOLAX) 17 gram/dose powder, , Disp: , Rfl:     senna-docusate 8.6-50 mg (PERICOLACE) 8.6-50 mg per tablet, Take 1 tablet by mouth 2 (two) times daily as needed for Constipation., Disp: , Rfl:     silver sulfADIAZINE 1% (SILVADENE) 1 % cream, Apply topically once daily., Disp: 50 g, Rfl: 0    spironolactone (ALDACTONE) 25 MG tablet, Take 1 tablet (25 mg total) by mouth once daily., Disp: 30 tablet, Rfl: 11    traZODone (DESYREL) 150 MG tablet, TAKE 1 TABLET EVERY EVENING., Disp: 90 tablet, Rfl: 3    varenicline (CHANTIX MURTAZA) 0.5 mg (11)- 1 mg (42) tablet, Take one 0.5mg tablet by mouth once daily for 3 days, then increase to one 0.5mg tablet twice daily for 3 days, then increase to one 1mg tablet twice daily., Disp: 42 tablet, Rfl: 0    REVIEW OF SYSTEMS:  GENERAL:  No progression of fatigue nor SOB  She is obese.  Difficulty   walking, extreme knee pain.  Gait instability only due to knees    HEENT:  No photophobia, rhinorrhea  RESPIRATORY:  +++ cough + wheezing.positive congestion  CARDIOVASCULAR+chest pain,NO  palpitations  GASTROINTESTINAL:  No abdominal pain, dysphagia, emesis, diarrhea, or   constipation.    GENITOURINARY:  No urinary frequency, hesitancy  RHEUM:  no arthralgias or joint swelling.  MUSCOLSKELETAL:  No neck pain, back pain, positive  arthralgias  NEUROLOGICAL:  No headaches, positive dizziness, + paresthesias  PSYCH:  No agitation, change in behavior, or anxiety.  ENDOCRINE:  No hot or cold intolerance.    PHYSICAL EXAMINATION:  BP (!) 95/50   Pulse 78   Temp 97 °F (36.1 °C)    "Resp 16   Ht 5' 8" (1.727 m)   Wt 130.6 kg (287 lb 14.7 oz)   LMP  (LMP Unknown)   SpO2 96%   BMI 43.78 kg/m²     GENERAL:  She is obese.  She is oriented, well developed, well nourished.  PSYCH:  Pleasant affect.  No anxiety or depression.  HEENT:  Normocephalic.  Lids intact, conjunctivae pink.  Sinuses tender to   palpation.  OP clear, no palatal pallor.  NECK:  Supple.  Trachea midline.  No palpable abnormalities.  CHEST:+ coarse BS, + fremitus  Decreased BS right base   CV S1S2 with RRR  ABDOMEN:  NT, ND.  Normal bowel sounds.  No palpable HSM or mass.  EXTREMITIES:  Legs, 1+ pitting edema.  NEUROLOGICAL:  Patient is having trouble even ambulating today with her walker due to severe back pain.    She is on hydrocodone for pain and is asking if I can write her for something stronger  SKIN:  Warm, dry.  Ecchymosis evident.  No tenting, petechiae    SHe just had injection in her knees : not steroids   LABS:      Lab Results   Component Value Date    WBC 7.60 11/20/2018    HGB 11.7 (L) 11/20/2018    HCT 37.4 11/20/2018    MCV 87 11/20/2018     11/20/2018     Iron and TIBC   Order: [id number]   Status:  Final result   Visible to patient:  Yes (Patient Portal) Next appt:  05/21/2018 at 05:20 PM in Family [facility] (Constantine Bird MD) Dx:  History of anemia     Ref Range & Units 2d ago 2mo ago    Iron 30 - 160 ug/dL 56  51     Transferrin 200 - 375 mg/dL 317  324     TIBC 250 - 450 ug/dL 469   480      Saturated Iron 20 - 50 % 12   11     Resulting Agency  [facility]      Specimen Collected: 03/12/18 14:04 Last Resulted: 03/13/18 01:02 Lab Flowsheet Order Details View Encounter Lab and Collection Details                 IMPRESSION AND PLAN:        Bronchitis  -     levoFLOXacin (LEVAQUIN) 500 MG tablet; Take 1 tablet (500 mg total) by mouth once daily.  Dispense: 7 tablet; Refill: 0  -     methylPREDNISolone (MEDROL DOSEPACK) 4 mg tablet; use as directed  Dispense: 1 Package; Refill: 0    Sinusitis, " unspecified chronicity, unspecified location  -     levoFLOXacin (LEVAQUIN) 500 MG tablet; Take 1 tablet (500 mg total) by mouth once daily.  Dispense: 7 tablet; Refill: 0  -     methylPREDNISolone (MEDROL DOSEPACK) 4 mg tablet; use as directed  Dispense: 1 Package; Refill: 0    SOB (shortness of breath)  -     levoFLOXacin (LEVAQUIN) 500 MG tablet; Take 1 tablet (500 mg total) by mouth once daily.  Dispense: 7 tablet; Refill: 0  -     methylPREDNISolone (MEDROL DOSEPACK) 4 mg tablet; use as directed  Dispense: 1 Package; Refill: 0    Wheezing  -     levoFLOXacin (LEVAQUIN) 500 MG tablet; Take 1 tablet (500 mg total) by mouth once daily.  Dispense: 7 tablet; Refill: 0  -     methylPREDNISolone (MEDROL DOSEPACK) 4 mg tablet; use as directed  Dispense: 1 Package; Refill: 0    Anemia, unspecified type  -     CBC auto differential; Standing  -     Iron and TIBC; Future; Expected date: 11/23/2018    History of iron deficiency  -     CBC auto differential; Standing  -     Iron and TIBC; Future; Expected date: 11/23/2018         Anemia stable : no need for further IV iron at this time  Trial of levaquin daily   Stop diflucan for now   Focus on decreasing weight to decrease her risk of a thrombotic event  She is high risk due to sedentary lifestyle and obesity   RTC 2  months with iron levels and cbc   Pt is in scooter because of difficulty with walking long distances  Cont lipitor to prevent plaques  Cont lasix to help with edema  Watch for peripheral destruction of cells with hepatomegaly  She is to continue Coumadin for chronic atrial fibrillation as well as her diabetic medication to help control hyperglycemia due to diabetes

## 2018-11-26 RX ORDER — LISINOPRIL 20 MG/1
20 TABLET ORAL DAILY
Qty: 90 TABLET | Refills: 1 | Status: SHIPPED | OUTPATIENT
Start: 2018-11-26 | End: 2018-12-31 | Stop reason: SDUPTHER

## 2018-11-27 ENCOUNTER — TELEPHONE (OUTPATIENT)
Dept: HEMATOLOGY/ONCOLOGY | Facility: CLINIC | Age: 68
End: 2018-11-27

## 2018-11-30 DIAGNOSIS — M43.12 SPONDYLOLISTHESIS OF CERVICAL REGION: ICD-10-CM

## 2018-11-30 DIAGNOSIS — M51.36 LUMBAR DEGENERATIVE DISC DISEASE: ICD-10-CM

## 2018-11-30 RX ORDER — HYDROCODONE BITARTRATE AND ACETAMINOPHEN 10; 325 MG/1; MG/1
1 TABLET ORAL EVERY 8 HOURS PRN
Qty: 90 TABLET | Refills: 0 | Status: SHIPPED | OUTPATIENT
Start: 2018-11-30 | End: 2018-12-06 | Stop reason: SDUPTHER

## 2018-11-30 NOTE — TELEPHONE ENCOUNTER
Patient requesting a refill of Hydrocodone. Please advise.  LR--11-3-18  LOV--11-9-18  FOV--12-6-18  Urine Toxicology--10-25-17  Pain Contract--10-25-17   checked; no abnormal activity noted.

## 2018-12-01 DIAGNOSIS — E11.610 DIABETIC NEUROGENIC ARTHROPATHY: ICD-10-CM

## 2018-12-01 DIAGNOSIS — E78.5 HYPERLIPIDEMIA, UNSPECIFIED HYPERLIPIDEMIA TYPE: Primary | ICD-10-CM

## 2018-12-03 RX ORDER — APIXABAN 5 MG/1
TABLET, FILM COATED ORAL
Qty: 90 TABLET | Refills: 3 | Status: SHIPPED | OUTPATIENT
Start: 2018-12-03 | End: 2018-12-06 | Stop reason: SDUPTHER

## 2018-12-03 RX ORDER — GABAPENTIN 800 MG/1
TABLET ORAL
Qty: 270 TABLET | Refills: 3 | Status: SHIPPED | OUTPATIENT
Start: 2018-12-03 | End: 2018-12-10 | Stop reason: SDUPTHER

## 2018-12-06 ENCOUNTER — LAB VISIT (OUTPATIENT)
Dept: LAB | Facility: HOSPITAL | Age: 68
End: 2018-12-06
Attending: FAMILY MEDICINE
Payer: MEDICARE

## 2018-12-06 ENCOUNTER — OFFICE VISIT (OUTPATIENT)
Dept: FAMILY MEDICINE | Facility: CLINIC | Age: 68
End: 2018-12-06
Payer: MEDICARE

## 2018-12-06 VITALS
DIASTOLIC BLOOD PRESSURE: 66 MMHG | TEMPERATURE: 98 F | SYSTOLIC BLOOD PRESSURE: 111 MMHG | HEIGHT: 68 IN | BODY MASS INDEX: 42.77 KG/M2 | WEIGHT: 282.19 LBS | HEART RATE: 74 BPM

## 2018-12-06 DIAGNOSIS — R16.0 HEPATOMEGALY: ICD-10-CM

## 2018-12-06 DIAGNOSIS — M43.12 SPONDYLOLISTHESIS OF CERVICAL REGION: ICD-10-CM

## 2018-12-06 DIAGNOSIS — I50.22 CHRONIC SYSTOLIC CONGESTIVE HEART FAILURE: ICD-10-CM

## 2018-12-06 DIAGNOSIS — E11.8 TYPE 2 DIABETES MELLITUS WITH COMPLICATION, WITHOUT LONG-TERM CURRENT USE OF INSULIN: Primary | ICD-10-CM

## 2018-12-06 DIAGNOSIS — L81.7: ICD-10-CM

## 2018-12-06 DIAGNOSIS — M51.36 LUMBAR DEGENERATIVE DISC DISEASE: ICD-10-CM

## 2018-12-06 DIAGNOSIS — F43.20 ADULT SITUATIONAL STRESS DISORDER: ICD-10-CM

## 2018-12-06 DIAGNOSIS — D50.1 IRON DEFICIENCY ANEMIA DUE TO SIDEROPENIC DYSPHAGIA: ICD-10-CM

## 2018-12-06 DIAGNOSIS — Z51.89 ENCOUNTER FOR PATIENT COMPLIANCE MONITORING IN DRUG TREATMENT PROGRAM: Primary | ICD-10-CM

## 2018-12-06 DIAGNOSIS — F17.200 TOBACCO DEPENDENCY: ICD-10-CM

## 2018-12-06 DIAGNOSIS — Z51.89 ENCOUNTER FOR PATIENT COMPLIANCE MONITORING IN DRUG TREATMENT PROGRAM: ICD-10-CM

## 2018-12-06 DIAGNOSIS — E66.01 MORBID OBESITY WITH BMI OF 40.0-44.9, ADULT: ICD-10-CM

## 2018-12-06 PROCEDURE — 3074F SYST BP LT 130 MM HG: CPT | Mod: CPTII,S$GLB,, | Performed by: FAMILY MEDICINE

## 2018-12-06 PROCEDURE — 1101F PT FALLS ASSESS-DOCD LE1/YR: CPT | Mod: CPTII,S$GLB,, | Performed by: FAMILY MEDICINE

## 2018-12-06 PROCEDURE — 3044F HG A1C LEVEL LT 7.0%: CPT | Mod: CPTII,S$GLB,, | Performed by: FAMILY MEDICINE

## 2018-12-06 PROCEDURE — 99999 PR PBB SHADOW E&M-EST. PATIENT-LVL III: CPT | Mod: PBBFAC,,, | Performed by: FAMILY MEDICINE

## 2018-12-06 PROCEDURE — 80307 DRUG TEST PRSMV CHEM ANLYZR: CPT

## 2018-12-06 PROCEDURE — 99214 OFFICE O/P EST MOD 30 MIN: CPT | Mod: S$GLB,,, | Performed by: FAMILY MEDICINE

## 2018-12-06 PROCEDURE — 3078F DIAST BP <80 MM HG: CPT | Mod: CPTII,S$GLB,, | Performed by: FAMILY MEDICINE

## 2018-12-06 PROCEDURE — 99499 UNLISTED E&M SERVICE: CPT | Mod: S$GLB,,, | Performed by: FAMILY MEDICINE

## 2018-12-06 RX ORDER — SODIUM CHLORIDE 9 MG/ML
INJECTION, SOLUTION INTRAVENOUS CONTINUOUS
Status: CANCELLED | OUTPATIENT
Start: 2018-12-06

## 2018-12-06 RX ORDER — TRAZODONE HYDROCHLORIDE 100 MG/1
100 TABLET ORAL NIGHTLY
Qty: 90 TABLET | Refills: 2 | Status: SHIPPED | OUTPATIENT
Start: 2018-12-06 | End: 2019-01-24

## 2018-12-06 RX ORDER — ZALEPLON 5 MG/1
5 CAPSULE ORAL NIGHTLY
Qty: 30 CAPSULE | Refills: 3 | Status: SHIPPED | OUTPATIENT
Start: 2018-12-06 | End: 2018-12-28 | Stop reason: SDUPTHER

## 2018-12-06 RX ORDER — SODIUM CHLORIDE 0.9 % (FLUSH) 0.9 %
10 SYRINGE (ML) INJECTION
Status: CANCELLED | OUTPATIENT
Start: 2018-12-06

## 2018-12-06 RX ORDER — FUROSEMIDE 40 MG/1
TABLET ORAL
Qty: 90 TABLET | Refills: 3
Start: 2018-12-06 | End: 2018-12-28 | Stop reason: SDUPTHER

## 2018-12-06 RX ORDER — BUPROPION HYDROCHLORIDE 100 MG/1
100 TABLET ORAL 2 TIMES DAILY
Qty: 60 TABLET | Refills: 11 | Status: SHIPPED | OUTPATIENT
Start: 2018-12-06 | End: 2019-04-25

## 2018-12-06 RX ORDER — CLONAZEPAM 1 MG/1
TABLET ORAL
Qty: 60 TABLET | Refills: 2 | Status: SHIPPED | OUTPATIENT
Start: 2018-12-20 | End: 2019-01-24

## 2018-12-06 RX ORDER — HEPARIN 100 UNIT/ML
5 SYRINGE INTRAVENOUS
Status: CANCELLED | OUTPATIENT
Start: 2018-12-06

## 2018-12-06 RX ORDER — HYDROCODONE BITARTRATE AND ACETAMINOPHEN 10; 325 MG/1; MG/1
1 TABLET ORAL EVERY 8 HOURS PRN
Qty: 90 TABLET | Refills: 0 | Status: SHIPPED | OUTPATIENT
Start: 2018-12-27 | End: 2019-01-23 | Stop reason: SDUPTHER

## 2018-12-06 NOTE — PATIENT INSTRUCTIONS

## 2018-12-10 DIAGNOSIS — E11.610 DIABETIC NEUROGENIC ARTHROPATHY: ICD-10-CM

## 2018-12-10 RX ORDER — NYSTATIN AND TRIAMCINOLONE ACETONIDE 100000; 1 [USP'U]/G; MG/G
CREAM TOPICAL 3 TIMES DAILY
Qty: 30 G | Refills: 1 | Status: SHIPPED | OUTPATIENT
Start: 2018-12-10 | End: 2018-12-28 | Stop reason: SDUPTHER

## 2018-12-10 RX ORDER — GABAPENTIN 800 MG/1
800 TABLET ORAL 3 TIMES DAILY
Qty: 270 TABLET | Refills: 3 | Status: SHIPPED | OUTPATIENT
Start: 2018-12-10 | End: 2019-02-19 | Stop reason: SDUPTHER

## 2018-12-10 RX ORDER — NYSTATIN 100000 [USP'U]/G
POWDER TOPICAL 2 TIMES DAILY
Qty: 120 G | Refills: 3 | Status: SHIPPED | OUTPATIENT
Start: 2018-12-10 | End: 2019-04-22 | Stop reason: SDUPTHER

## 2018-12-10 NOTE — TELEPHONE ENCOUNTER
Prescription for Gabapentin e-scribed to wrong pharmacy. Please send to [facility].   LOV--12-6-18  FOV--3-6-19

## 2018-12-11 LAB
6MAM UR QL: NOT DETECTED
7AMINOCLONAZEPAM UR QL: PRESENT
A-OH ALPRAZ UR QL: NOT DETECTED
ALPRAZ UR QL: NOT DETECTED
AMPHET UR QL SCN: NOT DETECTED
ANNOTATION COMMENT IMP: NORMAL
ANNOTATION COMMENT IMP: NORMAL
BARBITURATES UR QL: NOT DETECTED
BUPRENORPHINE UR QL: NOT DETECTED
BZE UR QL: NOT DETECTED
CARBOXYTHC UR QL: NOT DETECTED
CARISOPRODOL UR QL: NOT DETECTED
CLONAZEPAM UR QL: NOT DETECTED
CODEINE UR QL: NOT DETECTED
CREAT UR-MCNC: 28.1 MG/DL (ref 20–400)
DIAZEPAM UR QL: NOT DETECTED
ETHYL GLUCURONIDE UR QL: NOT DETECTED
FENTANYL UR QL: NOT DETECTED
HYDROCODONE UR QL: PRESENT
HYDROMORPHONE UR QL: NOT DETECTED
LORAZEPAM UR QL: NOT DETECTED
MDA UR QL: NOT DETECTED
MDEA UR QL: NOT DETECTED
MDMA UR QL: NOT DETECTED
ME-PHENIDATE UR QL: NOT DETECTED
MEPERIDINE UR QL: NOT DETECTED
METHADONE UR QL: NOT DETECTED
METHAMPHET UR QL: NOT DETECTED
MIDAZOLAM UR QL SCN: NOT DETECTED
MORPHINE UR QL: NOT DETECTED
NORBUPRENORPHINE UR QL CFM: NOT DETECTED
NORDIAZEPAM UR QL: NOT DETECTED
NORFENTANYL UR QL: NOT DETECTED
NORHYDROCODONE UR QL CFM: PRESENT
NOROXYCODONE UR QL CFM: NOT DETECTED
NOROXYMORPHONE: NOT DETECTED
OXAZEPAM UR QL: NOT DETECTED
OXYCODONE UR QL: NOT DETECTED
OXYMORPHONE UR QL: NOT DETECTED
PATHOLOGY STUDY: NORMAL
PCP UR QL: NOT DETECTED
PHENTERMINE UR QL: NOT DETECTED
PROPOXYPH UR QL: NOT DETECTED
SERVICE CMNT-IMP: NORMAL
TAPENTADOL UR QL SCN: NOT DETECTED
TAPENTADOL-O-SULF: NOT DETECTED
TEMAZEPAM UR QL: NOT DETECTED
TRAMADOL UR QL: NOT DETECTED
ZOLPIDEM UR QL: NOT DETECTED

## 2018-12-11 NOTE — PROGRESS NOTES
2130  Patient stated thAT HE WAS NOT USING  CPAP TONIGHT. Pre-Visit Chart Review  For Appointment Scheduled on 10/25/2017    Health Maintenance Due   Topic Date Due    Zoster Vaccine  11/15/2011    Influenza Vaccine  08/01/2017

## 2018-12-12 ENCOUNTER — HOSPITAL ENCOUNTER (OUTPATIENT)
Dept: RADIOLOGY | Facility: CLINIC | Age: 68
Discharge: HOME | End: 2018-12-12
Attending: FAMILY MEDICINE
Payer: MEDICARE

## 2018-12-12 DIAGNOSIS — L81.7: ICD-10-CM

## 2018-12-12 PROCEDURE — 93922 UPR/L XTREMITY ART 2 LEVELS: CPT | Mod: TC,PO

## 2018-12-12 PROCEDURE — 93925 LOWER EXTREMITY STUDY: CPT | Mod: 26,,, | Performed by: RADIOLOGY

## 2018-12-12 PROCEDURE — 93922 UPR/L XTREMITY ART 2 LEVELS: CPT | Mod: 26,,, | Performed by: RADIOLOGY

## 2018-12-14 ENCOUNTER — TELEPHONE (OUTPATIENT)
Dept: FAMILY MEDICINE | Facility: CLINIC | Age: 68
End: 2018-12-14

## 2018-12-14 DIAGNOSIS — R11.11 NON-INTRACTABLE VOMITING WITHOUT NAUSEA, UNSPECIFIED VOMITING TYPE: ICD-10-CM

## 2018-12-14 DIAGNOSIS — K31.84 GASTROPARESIS DIABETICORUM: Primary | ICD-10-CM

## 2018-12-14 DIAGNOSIS — E11.43 GASTROPARESIS DIABETICORUM: Primary | ICD-10-CM

## 2018-12-14 NOTE — TELEPHONE ENCOUNTER
----- Message from Jannie Hansen sent at 12/14/2018  1:50 PM CST -----  Type: Needs Medical Advice    Who Called:  P[facility] nurse  Symptoms (please be specific):  Nausea/vomiting  Best Call Back Number: [phone]  Additional Information: Patient is having nausea/vomiting, please prescribe Phenergan. Please contact  Nurse who will provider pharmacy information

## 2018-12-14 NOTE — TELEPHONE ENCOUNTER
"Hannah with [facility] reports patient has had several episodes of nausea/vomiting which started on today's date (12-14-17). States patient is vomiting stomach contents/no blood present. Mrs. Young states she believes patient has "stomach bug."  Requesting prescription for Phenergan. Please advise.   "

## 2018-12-15 DIAGNOSIS — J41.8 MIXED SIMPLE AND MUCOPURULENT CHRONIC BRONCHITIS: ICD-10-CM

## 2018-12-16 RX ORDER — FLUTICASONE FUROATE AND VILANTEROL TRIFENATATE 100; 25 UG/1; UG/1
POWDER RESPIRATORY (INHALATION)
Qty: 60 EACH | Refills: 2 | Status: SHIPPED | OUTPATIENT
Start: 2018-12-16 | End: 2018-12-28 | Stop reason: SDUPTHER

## 2018-12-17 DIAGNOSIS — J43.9 PULMONARY EMPHYSEMA, UNSPECIFIED EMPHYSEMA TYPE: ICD-10-CM

## 2018-12-17 RX ORDER — BUDESONIDE 0.5 MG/2ML
0.5 INHALANT ORAL DAILY
Qty: 180 ML | Refills: 4 | Status: SHIPPED | OUTPATIENT
Start: 2018-12-17 | End: 2019-04-22 | Stop reason: SDUPTHER

## 2018-12-17 RX ORDER — LEVALBUTEROL INHALATION SOLUTION 0.63 MG/3ML
SOLUTION RESPIRATORY (INHALATION)
Qty: 792 ML | Refills: 3 | Status: SHIPPED | OUTPATIENT
Start: 2018-12-17 | End: 2019-02-19 | Stop reason: SDUPTHER

## 2018-12-17 RX ORDER — ALBUTEROL SULFATE 0.63 MG/3ML
0.63 SOLUTION RESPIRATORY (INHALATION) EVERY 6 HOURS PRN
Qty: 75 ML | Refills: 11 | Status: SHIPPED | OUTPATIENT
Start: 2018-12-17 | End: 2022-04-05

## 2018-12-17 RX ORDER — ONDANSETRON 8 MG/1
8 TABLET, ORALLY DISINTEGRATING ORAL EVERY 6 HOURS PRN
Qty: 20 TABLET | Refills: 3 | Status: SHIPPED | OUTPATIENT
Start: 2018-12-17 | End: 2018-12-28 | Stop reason: SDUPTHER

## 2018-12-17 NOTE — PROGRESS NOTES
Subjective:       Patient ID: Nelly Tian is a 68 y.o. female.    Chief Complaint: No chief complaint on file.    HPI  Review of Systems   Constitutional: Negative for fatigue and unexpected weight change.   Respiratory: Negative for chest tightness and shortness of breath.    Cardiovascular: Negative for chest pain, palpitations and leg swelling.   Gastrointestinal: Negative for abdominal pain.   Musculoskeletal: Negative for arthralgias.   Neurological: Negative for dizziness, syncope, light-headedness and headaches.       Patient Active Problem List   Diagnosis    Diabetes mellitus with neuropathy    Long term current use of anticoagulant therapy    Major depression, recurrent, chronic    GERD (gastroesophageal reflux disease)    Tobacco dependency    Chronic atrial fibrillation    Morbid obesity with BMI of 40.0-44.9, adult    Hypertension associated with diabetes    PVD (peripheral vascular disease)    Abdominal aortic atherosclerosis    Dependency on pain medication    Iron deficiency anemia    DDD (degenerative disc disease), lumbar    Type 2 diabetes mellitus with complication, without long-term current use of insulin    Hyperlipidemia associated with type 2 diabetes mellitus    NSAID long-term use    Mixed restrictive and obstructive lung disease    Hypercapnic respiratory failure, chronic    COPD (chronic obstructive pulmonary disease)    Angina pectoris    CHF (congestive heart failure)    Hepatomegaly    Chronic combined systolic and diastolic CHF (congestive heart failure)    Decubitus ulcer    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 She appears well-developed and  well-nourished.   HENT:   Head: Normocephalic and atraumatic.   Right Ear: External ear normal.   Left Ear: External ear normal.   Nose: Nose normal.   Mouth/Throat: No oropharyngeal exudate.   Eyes: Conjunctivae and EOM are normal. Pupils are equal, round, and reactive to light. Right eye exhibits no discharge. Left eye exhibits no discharge. No scleral icterus.   Neck: Normal range of motion. Neck supple. No JVD present. No tracheal deviation present. No thyromegaly present.   Cardiovascular: Normal rate, normal heart sounds and intact distal pulses. Exam reveals no gallop and no friction rub.   No murmur heard.  Pulmonary/Chest: Effort normal. No stridor. No respiratory distress. She has no wheezes. She has no rales. She exhibits no tenderness.   Abdominal: Soft. Bowel sounds are normal. She exhibits no distension and no mass. There is no tenderness. There is no rebound and no guarding.   Musculoskeletal: Normal range of motion. She exhibits no edema.   Lymphadenopathy:     She has no cervical adenopathy.   Neurological: She is alert and oriented to person, place, and time. She displays normal reflexes. No cranial nerve deficit. She exhibits normal muscle tone. Coordination normal.   Skin: Skin is dry. No rash noted. She is not diaphoretic. No erythema. No pallor.   Psychiatric: She has a normal mood and affect. Her behavior is normal. Judgment and thought content normal.   Vitals reviewed.      Lab Results   Component Value Date    WBC 7.60 11/20/2018    HGB 11.7 (L) 11/20/2018    HCT 37.4 11/20/2018     11/20/2018    CHOL 136 06/13/2018    TRIG 140 06/13/2018    HDL 39 (L) 06/13/2018    ALT 20 09/23/2018    AST 17 09/23/2018     09/25/2018    K 4.6 09/25/2018     09/25/2018    CREATININE 0.6 09/25/2018    BUN 8 09/25/2018    CO2 30 (H) 09/25/2018    TSH 1.620 06/13/2018    INR 1.1 09/23/2018    HGBA1C 5.6 09/23/2018     The ASCVD Risk score (Rell YOLANDA Velasco., et al., 2013) failed to calculate for  the following reasons:    The patient has a prior MI or stroke diagnosis    Assessment:       1. Type 2 diabetes mellitus with complication, without long-term current use of insulin    2. Morbid obesity with BMI of 40.0-44.9, adult    3. Tobacco dependency    4. Hepatomegaly    5. Adult situational stress disorder    6. Iron deficiency anemia due to sideropenic dysphagia    7. Purpura pigmentosa chronica    8. Lumbar degenerative disc disease    9. Spondylolisthesis of cervical region    10. Chronic systolic congestive heart failure        Plan:       Type 2 diabetes mellitus with complication, without long-term current use of insulin    Morbid obesity with BMI of 40.0-44.9, adult    Tobacco dependency    Hepatomegaly    Adult situational stress disorder  -     buPROPion (WELLBUTRIN) 100 MG tablet; Take 1 tablet (100 mg total) by mouth 2 (two) times daily.  Dispense: 60 tablet; Refill: 11  -     traZODone (DESYREL) 100 MG tablet; Take 1 tablet (100 mg total) by mouth every evening.  Dispense: 90 tablet; Refill: 2  -     zaleplon (SONATA) 5 MG Cap; Take 1 capsule (5 mg total) by mouth every evening.  Dispense: 30 capsule; Refill: 3    Iron deficiency anemia due to sideropenic dysphagia  -     Ferritin; Future; Expected date: 12/06/2018    Purpura pigmentosa chronica  -     Cancel: US Lower Extremity Arteries Bilateral; Future; Expected date: 12/06/2018  -     US Lower Extrem Arteries Bilat with JOSEE (xpd); Future; Expected date: 12/12/2018    Lumbar degenerative disc disease  -     HYDROcodone-acetaminophen (NORCO)  mg per tablet; Take 1 tablet by mouth every 8 (eight) hours as needed for Pain (Do not take more than 3 a day. Do not mix with other narcotics.).  Dispense: 90 tablet; Refill: 0  -     clonazePAM (KLONOPIN) 1 MG tablet; 1 tab po bid prn  Dispense: 60 tablet; Refill: 2    Spondylolisthesis of cervical region  -     HYDROcodone-acetaminophen (NORCO)  mg per tablet; Take 1 tablet by mouth every 8  (eight) hours as needed for Pain (Do not take more than 3 a day. Do not mix with other narcotics.).  Dispense: 90 tablet; Refill: 0    Chronic systolic congestive heart failure  -     furosemide (LASIX) 40 MG tablet; TAKE 1 TABLET ONE TIME DAILY&nbsp;&nbsp;FOR&nbsp;&nbsp;FLUID&nbsp;&nbsp;OVERLOAD  Dispense: 90 tablet; Refill: 3      Patient readiness: acceptance and barriers:readiness    During the course of the visit the patient was educated and counseled about the following:     Obesity:   General weight loss/lifestyle modification strategies discussed (elicit support from others; identify saboteurs; non-food rewards, etc).    Goals: Hypertension: Reduce Blood Pressure    Did patient meet goals/outcomes: No    The following self management tools provided: blood pressure log  blood glucose log    Patient Instructions (the written plan) was given to the patient/family.     Time spent with patient: 45 minutes    Barriers to medications present (yes )    Adverse reactions to current medications (yes)    Over the counter medications reviewed (No)        40 minutes spent counseling patient on diet, exercise, and weight loss.  This has been fully explained to the patient, who indicates understanding.

## 2018-12-17 NOTE — TELEPHONE ENCOUNTER
Patient requesting a refill of the following medications:  Levalbuterol--LR--4-24-18  Budesonide--LR--6-25-18  LOV--12-6-18  FOV--3-6-19

## 2018-12-18 NOTE — TELEPHONE ENCOUNTER
Zofran e-scringris. Call placed to Hannah with [facility] no answer received. Call placed to patient for notification. Patient verbalized understanding.

## 2018-12-20 ENCOUNTER — TELEPHONE (OUTPATIENT)
Dept: SURGICAL ONCOLOGY | Facility: CLINIC | Age: 68
End: 2018-12-20

## 2018-12-21 ENCOUNTER — TELEPHONE (OUTPATIENT)
Dept: HEMATOLOGY/ONCOLOGY | Facility: CLINIC | Age: 68
End: 2018-12-21

## 2018-12-24 ENCOUNTER — TELEPHONE (OUTPATIENT)
Dept: HEMATOLOGY/ONCOLOGY | Facility: CLINIC | Age: 68
End: 2018-12-24

## 2018-12-28 ENCOUNTER — OFFICE VISIT (OUTPATIENT)
Dept: FAMILY MEDICINE | Facility: CLINIC | Age: 68
End: 2018-12-28
Payer: MEDICARE

## 2018-12-28 VITALS
WEIGHT: 270.94 LBS | TEMPERATURE: 98 F | BODY MASS INDEX: 41.06 KG/M2 | OXYGEN SATURATION: 95 % | DIASTOLIC BLOOD PRESSURE: 63 MMHG | HEART RATE: 65 BPM | HEIGHT: 68 IN | RESPIRATION RATE: 19 BRPM | SYSTOLIC BLOOD PRESSURE: 104 MMHG

## 2018-12-28 DIAGNOSIS — R09.81 CONGESTION OF NASAL SINUS: ICD-10-CM

## 2018-12-28 DIAGNOSIS — R11.11 NON-INTRACTABLE VOMITING WITHOUT NAUSEA, UNSPECIFIED VOMITING TYPE: ICD-10-CM

## 2018-12-28 DIAGNOSIS — I50.9 CHF (CONGESTIVE HEART FAILURE), NYHA CLASS III: ICD-10-CM

## 2018-12-28 DIAGNOSIS — E11.59 HYPERTENSION ASSOCIATED WITH DIABETES: ICD-10-CM

## 2018-12-28 DIAGNOSIS — R05.8 POST-VIRAL COUGH SYNDROME: Primary | ICD-10-CM

## 2018-12-28 DIAGNOSIS — J34.89 RHINORRHEA: ICD-10-CM

## 2018-12-28 DIAGNOSIS — I15.2 HYPERTENSION ASSOCIATED WITH DIABETES: ICD-10-CM

## 2018-12-28 DIAGNOSIS — J41.8 MIXED SIMPLE AND MUCOPURULENT CHRONIC BRONCHITIS: ICD-10-CM

## 2018-12-28 DIAGNOSIS — E11.43 GASTROPARESIS DIABETICORUM: ICD-10-CM

## 2018-12-28 DIAGNOSIS — I50.22 CHRONIC SYSTOLIC CONGESTIVE HEART FAILURE: ICD-10-CM

## 2018-12-28 DIAGNOSIS — F33.9 MAJOR DEPRESSION, RECURRENT, CHRONIC: ICD-10-CM

## 2018-12-28 DIAGNOSIS — I10 HYPERTENSION, UNSPECIFIED TYPE: Primary | ICD-10-CM

## 2018-12-28 DIAGNOSIS — F43.20 ADULT SITUATIONAL STRESS DISORDER: ICD-10-CM

## 2018-12-28 DIAGNOSIS — F17.200 TOBACCO DEPENDENCY: ICD-10-CM

## 2018-12-28 DIAGNOSIS — K21.9 GASTROESOPHAGEAL REFLUX DISEASE, ESOPHAGITIS PRESENCE NOT SPECIFIED: ICD-10-CM

## 2018-12-28 DIAGNOSIS — K31.84 GASTROPARESIS DIABETICORUM: ICD-10-CM

## 2018-12-28 PROCEDURE — 99999 PR PBB SHADOW E&M-EST. PATIENT-LVL V: CPT | Mod: PBBFAC,,, | Performed by: NURSE PRACTITIONER

## 2018-12-28 PROCEDURE — 3078F DIAST BP <80 MM HG: CPT | Mod: CPTII,S$GLB,, | Performed by: NURSE PRACTITIONER

## 2018-12-28 PROCEDURE — 1101F PT FALLS ASSESS-DOCD LE1/YR: CPT | Mod: CPTII,S$GLB,, | Performed by: NURSE PRACTITIONER

## 2018-12-28 PROCEDURE — 99214 OFFICE O/P EST MOD 30 MIN: CPT | Mod: S$GLB,,, | Performed by: NURSE PRACTITIONER

## 2018-12-28 PROCEDURE — 3074F SYST BP LT 130 MM HG: CPT | Mod: CPTII,S$GLB,, | Performed by: NURSE PRACTITIONER

## 2018-12-28 RX ORDER — ZALEPLON 5 MG/1
5 CAPSULE ORAL NIGHTLY
Qty: 30 CAPSULE | Refills: 3 | Status: SHIPPED | OUTPATIENT
Start: 2018-12-28 | End: 2019-01-24 | Stop reason: CLARIF

## 2018-12-28 RX ORDER — DULOXETIN HYDROCHLORIDE 30 MG/1
CAPSULE, DELAYED RELEASE ORAL
COMMUNITY
Start: 2018-12-16 | End: 2019-04-25 | Stop reason: SINTOL

## 2018-12-28 RX ORDER — FLUTICASONE PROPIONATE 50 MCG
1 SPRAY, SUSPENSION (ML) NASAL DAILY
Qty: 1 BOTTLE | Refills: 2 | Status: SHIPPED | OUTPATIENT
Start: 2018-12-28 | End: 2019-04-04 | Stop reason: SDUPTHER

## 2018-12-28 RX ORDER — METOPROLOL SUCCINATE 25 MG/1
12.5 TABLET, EXTENDED RELEASE ORAL DAILY
Qty: 45 TABLET | Refills: 3 | Status: SHIPPED | OUTPATIENT
Start: 2018-12-28 | End: 2019-08-16 | Stop reason: SINTOL

## 2018-12-28 RX ORDER — NYSTATIN AND TRIAMCINOLONE ACETONIDE 100000; 1 [USP'U]/G; MG/G
CREAM TOPICAL 3 TIMES DAILY
Qty: 30 G | Refills: 1 | Status: SHIPPED | OUTPATIENT
Start: 2018-12-28 | End: 2018-12-28 | Stop reason: SDUPTHER

## 2018-12-28 RX ORDER — POTASSIUM CHLORIDE 20 MEQ/1
20 TABLET, EXTENDED RELEASE ORAL DAILY
Qty: 30 TABLET | Refills: 6 | Status: SHIPPED | OUTPATIENT
Start: 2018-12-28 | End: 2019-08-06 | Stop reason: SDUPTHER

## 2018-12-28 RX ORDER — NYSTATIN AND TRIAMCINOLONE ACETONIDE 100000; 1 [USP'U]/G; MG/G
CREAM TOPICAL 3 TIMES DAILY
Qty: 30 G | Refills: 1 | Status: SHIPPED | OUTPATIENT
Start: 2018-12-28 | End: 2019-02-19 | Stop reason: SDUPTHER

## 2018-12-28 RX ORDER — LORATADINE 10 MG/1
10 TABLET ORAL DAILY
Refills: 0 | COMMUNITY
Start: 2018-12-28 | End: 2021-01-15 | Stop reason: CLARIF

## 2018-12-28 RX ORDER — FLUTICASONE FUROATE AND VILANTEROL 100; 25 UG/1; UG/1
1 POWDER RESPIRATORY (INHALATION) DAILY
Qty: 60 EACH | Refills: 2 | Status: SHIPPED | OUTPATIENT
Start: 2018-12-28 | End: 2019-01-07 | Stop reason: SDUPTHER

## 2018-12-28 RX ORDER — FUROSEMIDE 40 MG/1
TABLET ORAL
Qty: 90 TABLET | Refills: 3
Start: 2018-12-28 | End: 2019-01-07 | Stop reason: SDUPTHER

## 2018-12-28 RX ORDER — BENZONATATE 100 MG/1
100 CAPSULE ORAL 3 TIMES DAILY PRN
Qty: 30 CAPSULE | Refills: 1 | Status: SHIPPED | OUTPATIENT
Start: 2018-12-28 | End: 2018-12-31 | Stop reason: SDUPTHER

## 2018-12-28 RX ORDER — ONDANSETRON 8 MG/1
8 TABLET, ORALLY DISINTEGRATING ORAL EVERY 6 HOURS PRN
Qty: 20 TABLET | Refills: 3 | Status: SHIPPED | OUTPATIENT
Start: 2018-12-28 | End: 2019-12-02

## 2018-12-28 RX ORDER — SPIRONOLACTONE 25 MG/1
25 TABLET ORAL DAILY
Qty: 30 TABLET | Refills: 11 | Status: SHIPPED | OUTPATIENT
Start: 2018-12-28 | End: 2019-08-16

## 2018-12-28 RX ORDER — OMEPRAZOLE 20 MG/1
20 CAPSULE, DELAYED RELEASE ORAL DAILY
Qty: 30 CAPSULE | Refills: 11 | Status: SHIPPED | OUTPATIENT
Start: 2018-12-28 | End: 2019-05-16

## 2018-12-28 RX ORDER — ATORVASTATIN CALCIUM 20 MG/1
20 TABLET, FILM COATED ORAL DAILY
Qty: 90 TABLET | Refills: 3 | Status: SHIPPED | OUTPATIENT
Start: 2018-12-28 | End: 2020-01-02

## 2018-12-28 NOTE — PATIENT INSTRUCTIONS
COPD Flare    You have had a flare-up of your COPD.  COPD, or chronic obstructive pulmonary disease, is a common lung disease. It causes your airways to become irritated and narrower. This makes it harder for you to breathe. Emphysema and chronic bronchitis are both types of COPD. This is a chronic condition, which means you always have it. Sometimes it gets worse. When this happens, it is called a flare-up.  Symptoms of COPD  People with COPD may have symptoms most of the time. In a flare-up, your symptoms get worse. These symptoms may mean you are having a flare-up:  · Shortness of breath, shallow or rapid breathing, or wheezing that gets worse  · Lung infection  · Cough that gets worse  · More mucus, thicker mucus or mucus of a different color  · Tiredness, decreased energy, or trouble doing your usual activities  · Fever  · Chest tightness  · Your symptoms dont get better even when you use your usual medicines, inhalers, and nebulizer  · Trouble talking  · You feel confused  Causes of flare-ups  Unfortunately, a flare-up can happen even though you did everything right, and you followed your doctors instructions. Some causes of flare-ups are:  · Smoking or secondhand smoke  · Colds, the flu, or respiratory infections  · Air pollution  · Sudden change in the weather  · Dust, irritating chemicals, or strong fumes  · Not taking your medicines as prescribed  Home care  Here are some things you can do at home to treat a flare-up:  · Try not to panic. This makes it harder to breathe, and keeps you from doing the right things.  · Dont smoke or be around others who are smoking.  · Try to drink more fluids than usual during a flare-up, unless your doctor has told you not to because of heart and kidney problems. More fluids can help loosen the mucus.  · Use your inhalers and nebulizer, if you have one, as you have been told to.  · If you were given antibiotics, take them until they are used up or your doctor tells you  to stop. Its important to finish the antibiotics, even though you feel better. This will make sure the infection has cleared.  · If you were given prednisone or another steroid, finish it even if you feel better.  Preventing a flare-up  Even though flare-ups happen, the best way to treat one is to prevent it before it starts. Here are some pointers:  · Dont smoke or be around others who are smoking.  · Take your medicines as you have been told.  · Talk with your doctor about getting a flu shot every year. Also find out if you need a pneumonia shot.  · If there is a weather advisory warning to stay indoors, try to stay inside when possible.  · Try to eat healthy and get plenty of sleep.  · Try to avoid things that usually set you off, like dust, chemical fumes, hairsprays, or strong perfumes.  Follow-up care  Follow up with your healthcare provider, or as advised.  If a culture was done, you will be told if your treatment needs to be changed. You can call as directed for the results.  If X-rays were done, you will be notified of any new findings that may affect your care.  Call 911  Call 911 if any of these occur:  · You have trouble breathing  · You feel confused or its difficult to wake you up  · You faint or lose consciousness  · You have a rapid heart rate  · You have new pain in your chest, arm, shoulder, neck or upper back  When to seek medical advice  Call your healthcare provider right away if any of these occur:  · Wheezing or shortness of breath gets worse  · You need to use your inhalers more often than usual without relief  · Fever of 100.4°F (38ºC) or higher, or as directed by your healthcare provider  · Coughing up lots of dark-colored or bloody mucus (sputum)  · Chest pain with each breath  · You do not start to get better within 24 hours  · Swelling of your ankles gets worse  · Dizziness or weakness  Date Last Reviewed: 9/1/2016  © 0963-5035 [employer]. [address] [facility],  [place] [address]. All rights reserved. This information is not intended as a substitute for professional medical care. Always follow your healthcare professional's instructions.        What is COPD?  COPD stands for chronic obstructive pulmonary disease. It means the airways in your lungs are blocked (obstructed). Because of this, it is hard to breathe. You may have trouble with daily activities because of shortness of breath. Over time the shortness of breath usually worsens making it more and more difficult to take care of yourself and take part in activities. Chronic bronchitis and emphysema are two common types of COPD.  What happens in chronic bronchitis?    The cells in the airways make more mucus than normal. The mucus builds up, narrowing the airways. This means less air travels into and out of the lungs. The lining of the airways may also become inflamed (swollen) and causes the airways to narrow even more.        What happens in emphysema?    The small airways are damaged and lose their stretchiness. The airways collapse when you exhale, causing air to get trapped in the air sacs. This means that less oxygen enters the blood vessels and less oxygen is delivered to all of the cells of your body. This makes it hard to breathe.     Damage to cilia    Cilia are small hairs that line and protect the airways. Smoking damages the cilia. Damaged cilia cant sweep mucus and particles away. Some of the cilia are destroyed. This damage worsens COPD.  How did I get COPD?  Most people get COPD from smoking. Cigarette smoke damages lungs, which can develop into COPD over many years.  How COPD affects you  COPD makes you work harder to breathe. Air may get trapped in the lungs, which prevents your lungs from filling completely with fresh oxygen-filled air when you inhale (breathe in). It's harder to take deep breaths especially when you are active and start breathing faster. Over time, your lungs may become enlarged  filling the lung with air that does not transfer oxygen into the blood. These problems cause you to have shortness of breath (also called dyspnea). Wheezing (hoarse, whistling breathing), chronic cough, and fatigue (feeling tired and worn out) are also common.   Date Last Reviewed: 5/1/2016  © 3084-3530 [employer]. [address] [place] [address], [place] [address]. All rights reserved. This information is not intended as a substitute for professional medical care. Always follow your healthcare professional's instructions.        Coughing Techniques    Airway clearance techniques help to remove mucus from your airways. Clearing the airways helps you breathe better and lowers the chance for infection. One method is controlled coughing. Be sure to also use any medicines before or after clearing your airways, as instructed by your healthcare provider. For example, some people use inhaled bronchodilators before clearing their airways.      Note: Be sure to keep a box of tissues beside you. Wash your hands when you are done.   Controlled coughing  Here is how to do it:   · Sit on a chair with both feet on the floor.  · Take a slow, deep breath through your nose. Hold for 2 counts.  · Lean forward slightly.  · Cough twice--2 short coughs.  · Relax for a few seconds.  Repeat the steps as needed.   The johns technique  Here is how to do it:   · Sit on a chair with both feet on the floor.  · Take a slow, deep breath through your nose. Hold for 2 counts.  · To breathe out, open your mouth and make a johns sound in your throat. (This is the same way you might breathe to clean a pair of eyeglasses.)  · Johns 2 to 3 times as you breathe out.  · Relax for a few seconds.  Repeat the steps as needed.  Follow-up care  Make a follow-up appointment as directed by our staff.     When to call the healthcare provider  Call your healthcare provider right away if you have any of the following:  · Shortness of breath, wheezing, or  coughing  · Increased mucus  · Yellow, green, bloody, or smelly mucus  · Fever or chills  · Tightness in your chest that does not go away with rest or medicine  · An irregular heartbeat         Date Last Reviewed: 5/1/2016  © 5616-0454 [employer]. [address] [place] [address]. All rights reserved. This information is not intended as a substitute for professional medical care. Always follow your healthcare professional's instructions.

## 2018-12-28 NOTE — PROGRESS NOTES
Subjective:       Patient ID: Nelly Tian is a 68 y.o. female.    Chief Complaint: cough, congestion    Patient is a 68 year old female who presents today with complaints of a productive cough for one week.  Patient was seen on 11/9/18 for COPD exacerbation and prescribed Augmeumtin for 7 days, patient verbalizes taking all medication and the cough stop for but started and started again. Patient history and labs reviewed, questions and concerns addressed.      Cough   This is a recurrent problem. The current episode started in the past 7 days. The problem has been gradually improving. The problem occurs hourly. The cough is productive of sputum (green). Associated symptoms include ear congestion, a fever, headaches, myalgias (chronic), nasal congestion, postnasal drip and wheezing. Pertinent negatives include no chest pain, sore throat or shortness of breath. Nothing aggravates the symptoms. She has tried nothing (was prescribed antibotics in 11/19/18) for the symptoms. Her past medical history is significant for COPD and pneumonia.     Review of Systems   Constitutional: Positive for fatigue and fever.   HENT: Positive for congestion, postnasal drip and sinus pressure. Negative for sore throat.    Respiratory: Positive for cough and wheezing. Negative for shortness of breath.    Cardiovascular: Negative for chest pain.   Gastrointestinal: Negative for constipation and diarrhea.   Genitourinary: Negative for difficulty urinating, frequency and urgency.   Musculoskeletal: Positive for back pain (back pain) and myalgias (chronic).   Skin: Negative.    Allergic/Immunologic: Negative for immunocompromised state.   Neurological: Positive for weakness and headaches.   Hematological: Negative.    Psychiatric/Behavioral: Negative for agitation. The patient is not nervous/anxious.        Objective:      /63 (BP Location: Left arm, Patient Position: Sitting, BP Method: Medium (Automatic))   Pulse 65   Temp 97.9  "°F (36.6 °C) (Oral)   Resp 19   Ht 5' 8" (1.727 m)   Wt 122.9 kg (270 lb 15.1 oz)   LMP  (LMP Unknown)   SpO2 95%   BMI 41.20 kg/m²   Physical Exam   Constitutional: She is oriented to person, place, and time. She appears well-developed and well-nourished.   Eyes: EOM are normal. Pupils are equal, round, and reactive to light.   Neck: Normal range of motion.   Cardiovascular: Normal rate, regular rhythm and normal heart sounds.   Pulmonary/Chest: Effort normal. She has decreased breath sounds in the right lower field and the left lower field.           Abdominal: Soft. Bowel sounds are normal.   Musculoskeletal: Normal range of motion.   Neurological: She is alert and oriented to person, place, and time.   Skin: Skin is warm and dry.   Psychiatric: She has a normal mood and affect. Her behavior is normal. Judgment and thought content normal.       Assessment:       1. Post-viral cough syndrome    2. Rhinorrhea    3. Congestion of nasal sinus    4. Tobacco dependency    5. Major depression, recurrent, chronic        Plan:       Post-viral cough syndrome  -     benzonatate (TESSALON) 100 MG capsule; Take 1 capsule (100 mg total) by mouth 3 (three) times daily as needed for Cough.  Dispense: 30 capsule; Refill: 1    Rhinorrhea  -     loratadine (CLARITIN) 10 mg tablet; Take 1 tablet (10 mg total) by mouth once daily.; Refill: 0    Congestion of nasal sinus  -     fluticasone (FLONASE) 50 mcg/actuation nasal spray; 1 spray (50 mcg total) by Each Nare route once daily.  Dispense: 1 Bottle; Refill: 2    Tobacco dependency   Refused Tobacco Cessation Program  Major depression, recurrent, chronic   Stable, continue medication  Other orders  -     nystatin-triamcinolone (MYCOLOG II) cream; Apply topically 3 (three) times daily.  Dispense: 30 g; Refill: 1      Patient readiness: acceptance and barriers:none    During the course of the visit the patient was educated and counseled about the following:     Diabetes:  " Discussed general issues about diabetes pathophysiology and management.  Educational material distributed.  Addressed ADA diet.  Hypertension:   Dietary sodium restriction.  Regular aerobic exercise.  Obesity:   General weight loss/lifestyle modification strategies discussed (elicit support from others; identify saboteurs; non-food rewards, etc).  Regular aerobic exercise program discussed.    Goals: Diabetes: Maintain Hemoglobin A1C below 7, Hypertension: Reduce Blood Pressure and Obesity: Reduce calorie intake and BMI    Did patient meet goals/outcomes: No    The following self management tools provided: declined    Patient Instructions (the written plan) was given to the patient/family.     Time spent with patient: 45 minutes    Barriers to medications present (no )    Adverse reactions to current medications (no)    Over the counter medications reviewed (Yes)

## 2018-12-28 NOTE — TELEPHONE ENCOUNTER
Pt is requesting medication refill    Last visit: 12/6/18  Last refill: 12/17/18  Follow Up: 3/6/19

## 2018-12-31 DIAGNOSIS — R05.8 POST-VIRAL COUGH SYNDROME: ICD-10-CM

## 2018-12-31 RX ORDER — LISINOPRIL 20 MG/1
20 TABLET ORAL DAILY
Qty: 90 TABLET | Refills: 1 | Status: SHIPPED | OUTPATIENT
Start: 2018-12-31 | End: 2019-10-02 | Stop reason: SDUPTHER

## 2018-12-31 RX ORDER — BENZONATATE 100 MG/1
100 CAPSULE ORAL 3 TIMES DAILY PRN
Qty: 30 CAPSULE | Refills: 1 | Status: SHIPPED | OUTPATIENT
Start: 2018-12-31 | End: 2019-01-10

## 2019-01-01 NOTE — PROGRESS NOTES
"8/3/2018  Follow up completed this am.  Patient reports that she is doing well. Received packet that was mailed.  Encouraged contact with smoking cessation - patient states "I have cut way down".  Encouraged contacting smoking cessation. Per patient  Home Health visits have completed, SN is not longer doing wound care.  Patient states she has had these wounds for some time, discouraged as she say's they are not healing.  Educated on smoking impacts on wound healing, proper nutrition and glucose control.  Will send Lissett re wound care.  Patient reports that her daughter is doing the wound care.  Encouraged hand washing before and after wound care.  Continue to educate about wound care Review signs and symptoms of wound infection.   Encourage patient to follow medication and treatment regimen as prescribed by Doctor.  Encourage patient to maintain follow up with doctors.  Patient does continue to be followed by [facility] wound clinic ever couple of weeks.  Patient reports she has not yet received shower chair.  Will follow up with provider re JUANCHO.  Discussed other options with patient as well, good will, [employer] and [employer] website for inexpensive options.  Patient is agreeable for follow up call in couple weeks.  Encouraged patient to call me with any needs, confirmed she has my contact information.    Interventions: mail lissett, collaborate with dietitian, facilitate to DME.    Follow up plan:  DME order, continue to educate on wound care, DM, smoking cessation, patient with wellness appointment on 8/22 plan to collaborate with provider.  JUANITA Zhu        "
9

## 2019-01-07 ENCOUNTER — TELEPHONE (OUTPATIENT)
Dept: HEMATOLOGY/ONCOLOGY | Facility: CLINIC | Age: 69
End: 2019-01-07

## 2019-01-07 DIAGNOSIS — J41.8 MIXED SIMPLE AND MUCOPURULENT CHRONIC BRONCHITIS: ICD-10-CM

## 2019-01-07 DIAGNOSIS — I50.22 CHRONIC SYSTOLIC CONGESTIVE HEART FAILURE: ICD-10-CM

## 2019-01-07 RX ORDER — FLUTICASONE FUROATE AND VILANTEROL TRIFENATATE 100; 25 UG/1; UG/1
POWDER RESPIRATORY (INHALATION)
Qty: 60 EACH | Refills: 2 | Status: SHIPPED | OUTPATIENT
Start: 2019-01-07 | End: 2019-04-18 | Stop reason: SDUPTHER

## 2019-01-07 RX ORDER — FUROSEMIDE 40 MG/1
TABLET ORAL
Qty: 90 TABLET | Refills: 3 | Status: SHIPPED | OUTPATIENT
Start: 2019-01-07 | End: 2019-02-19 | Stop reason: SDUPTHER

## 2019-01-07 NOTE — TELEPHONE ENCOUNTER
Patient requesting a refill of Furosemide.   LR--12-28-18 (set to no print)  LOV--12-28-18 (Ramon)  FOV--3-6-19 (Ramon)

## 2019-01-07 NOTE — TELEPHONE ENCOUNTER
----- Message from Kathleen Pickens sent at 1/7/2019  9:47 AM CST -----  Contact: [phone]  Patient is returning nurse's phone call, to reschedule appt.  Please call patient back at [phone].

## 2019-01-07 NOTE — TELEPHONE ENCOUNTER
Called and spoke to pt to reschedule upcoming apt with Dr. Ramos. Pt confirmed apt rescheduled and verbalized understanding.

## 2019-01-08 ENCOUNTER — TELEPHONE (OUTPATIENT)
Dept: OPHTHALMOLOGY | Facility: CLINIC | Age: 69
End: 2019-01-08

## 2019-01-08 NOTE — TELEPHONE ENCOUNTER
Spoke to Trish on behalf of the patient, she was requesting medical records from the patient's last visit in regards to her DM with [employer]. I gave them the info to call [facility] to receive the records.

## 2019-01-08 NOTE — TELEPHONE ENCOUNTER
----- Message from Zayra Sandoval sent at 1/8/2019  9:46 AM CST -----  Contact: Kimberlyn LAWTON  Needs Advice    Reason for call:Requesting medical records for last visit.        Communication Preference:[phone]    Additional Information:

## 2019-01-09 DIAGNOSIS — J42 CHRONIC BRONCHITIS, UNSPECIFIED CHRONIC BRONCHITIS TYPE: ICD-10-CM

## 2019-01-09 DIAGNOSIS — I50.9 CONGESTIVE HEART FAILURE, UNSPECIFIED HF CHRONICITY, UNSPECIFIED HEART FAILURE TYPE: Primary | ICD-10-CM

## 2019-01-09 DIAGNOSIS — J96.12 HYPERCAPNIC RESPIRATORY FAILURE, CHRONIC: ICD-10-CM

## 2019-01-09 NOTE — TELEPHONE ENCOUNTER
Patient states she needs a new nebulizer machine. States she was advised by her insurance company to contact PCP for order and have order faxed to [facility]. Please advise.

## 2019-01-09 NOTE — TELEPHONE ENCOUNTER
----- Message from Kiana Tripathi sent at 1/9/2019  9:49 AM CST -----  Contact: self  Patient need to speak to nurse regarding patient states she need a new nebulizer machine and was informed by her insurance company she should go through her Dr. Bird      Please call to advice [phone] (home)

## 2019-01-10 ENCOUNTER — TELEPHONE (OUTPATIENT)
Dept: FAMILY MEDICINE | Facility: CLINIC | Age: 69
End: 2019-01-10

## 2019-01-10 NOTE — TELEPHONE ENCOUNTER
Spoke to patient who states she has cleaned the nebulizer filter with no improvement in performance in nebulizer machine. States it takes at least one hour to complete on nebulizer treatment. Order for nebulizer/nebulizer kit faxed to [facility]. Patient notified. Verbalized understanding.

## 2019-01-10 NOTE — TELEPHONE ENCOUNTER
----- Message from Shira Aguirre sent at 1/10/2019 11:10 AM CST -----  Type: Needs Medical Advice    Who Called:  [facility]  Best Call Back Number: [phone] phone, [phone]  Additional Information: received order for nebulizer but needs office notes    Thank you

## 2019-01-14 ENCOUNTER — TELEPHONE (OUTPATIENT)
Dept: ADMINISTRATIVE | Facility: CLINIC | Age: 69
End: 2019-01-14

## 2019-01-16 ENCOUNTER — TELEPHONE (OUTPATIENT)
Dept: FAMILY MEDICINE | Facility: CLINIC | Age: 69
End: 2019-01-16

## 2019-01-16 NOTE — TELEPHONE ENCOUNTER
----- Message from Shelia Shersammi sent at 1/16/2019 12:13 PM CST -----  Contact: Self  Patient states that her insurance will not pay for the her prescription of zaleplon (SONATA) 5 MG Cap. Please see if you can order another that her insurance will pay for and send over to   [facility] - [place], [place] - [address] [facility] [address] [id number] [facility] [address]  [place] [address]  Phone: [phone] Fax: [phone]  Call back patient at [phone] (home),.   Thank you!

## 2019-01-19 ENCOUNTER — HOSPITAL ENCOUNTER (INPATIENT)
Facility: HOSPITAL | Age: 69
LOS: 3 days | Discharge: HOME HEALTH | DRG: 871 | End: 2019-01-22
Attending: EMERGENCY MEDICINE | Admitting: HOSPITALIST
Payer: MEDICARE

## 2019-01-19 DIAGNOSIS — J44.1 CHRONIC OBSTRUCTIVE PULMONARY DISEASE WITH ACUTE EXACERBATION: Primary | ICD-10-CM

## 2019-01-19 DIAGNOSIS — A41.9 SEVERE SEPSIS: ICD-10-CM

## 2019-01-19 DIAGNOSIS — J18.9 PNEUMONIA OF RIGHT LOWER LOBE DUE TO INFECTIOUS ORGANISM: ICD-10-CM

## 2019-01-19 DIAGNOSIS — R06.02 SOB (SHORTNESS OF BREATH): ICD-10-CM

## 2019-01-19 DIAGNOSIS — R65.20 SEVERE SEPSIS: ICD-10-CM

## 2019-01-19 LAB
ALBUMIN SERPL BCP-MCNC: 3.3 G/DL
ALP SERPL-CCNC: 86 U/L
ALT SERPL W/O P-5'-P-CCNC: 14 U/L
ANION GAP SERPL CALC-SCNC: 11 MMOL/L
AST SERPL-CCNC: 14 U/L
BASOPHILS # BLD AUTO: 0 K/UL
BASOPHILS NFR BLD: 0.2 %
BILIRUB SERPL-MCNC: 0.3 MG/DL
BNP SERPL-MCNC: 123 PG/ML
BUN SERPL-MCNC: 9 MG/DL
CALCIUM SERPL-MCNC: 9.8 MG/DL
CHLORIDE SERPL-SCNC: 97 MMOL/L
CO2 SERPL-SCNC: 34 MMOL/L
CREAT SERPL-MCNC: 0.7 MG/DL
DIFFERENTIAL METHOD: ABNORMAL
EOSINOPHIL # BLD AUTO: 0.2 K/UL
EOSINOPHIL NFR BLD: 1.4 %
ERYTHROCYTE [DISTWIDTH] IN BLOOD BY AUTOMATED COUNT: 17 %
EST. GFR  (AFRICAN AMERICAN): >60 ML/MIN/1.73 M^2
EST. GFR  (NON AFRICAN AMERICAN): >60 ML/MIN/1.73 M^2
FLUAV AG SPEC QL IA: NEGATIVE
FLUBV AG SPEC QL IA: NEGATIVE
GLUCOSE SERPL-MCNC: 92 MG/DL
HCT VFR BLD AUTO: 39.6 %
HGB BLD-MCNC: 12.1 G/DL
LACTATE SERPL-SCNC: 1.2 MMOL/L
LACTATE SERPL-SCNC: 2.7 MMOL/L
LYMPHOCYTES # BLD AUTO: 1 K/UL
LYMPHOCYTES NFR BLD: 6.8 %
MCH RBC QN AUTO: 25.6 PG
MCHC RBC AUTO-ENTMCNC: 30.5 G/DL
MCV RBC AUTO: 84 FL
MONOCYTES # BLD AUTO: 0.9 K/UL
MONOCYTES NFR BLD: 6.5 %
NEUTROPHILS # BLD AUTO: 12 K/UL
NEUTROPHILS NFR BLD: 85.1 %
PLATELET # BLD AUTO: 251 K/UL
PMV BLD AUTO: 8.7 FL
POCT GLUCOSE: 149 MG/DL (ref 70–110)
POTASSIUM SERPL-SCNC: 4.6 MMOL/L
PROT SERPL-MCNC: 7.6 G/DL
RBC # BLD AUTO: 4.72 M/UL
SODIUM SERPL-SCNC: 142 MMOL/L
SPECIMEN SOURCE: NORMAL
TROPONIN I SERPL DL<=0.01 NG/ML-MCNC: 0.01 NG/ML
WBC # BLD AUTO: 14.1 K/UL

## 2019-01-19 PROCEDURE — 12000002 HC ACUTE/MED SURGE SEMI-PRIVATE ROOM

## 2019-01-19 PROCEDURE — 87400 INFLUENZA A/B EACH AG IA: CPT | Mod: 59

## 2019-01-19 PROCEDURE — 83036 HEMOGLOBIN GLYCOSYLATED A1C: CPT

## 2019-01-19 PROCEDURE — 85025 COMPLETE CBC W/AUTO DIFF WBC: CPT

## 2019-01-19 PROCEDURE — 96361 HYDRATE IV INFUSION ADD-ON: CPT

## 2019-01-19 PROCEDURE — 84484 ASSAY OF TROPONIN QUANT: CPT

## 2019-01-19 PROCEDURE — 36415 COLL VENOUS BLD VENIPUNCTURE: CPT

## 2019-01-19 PROCEDURE — 63600175 PHARM REV CODE 636 W HCPCS: Performed by: NURSE PRACTITIONER

## 2019-01-19 PROCEDURE — 83605 ASSAY OF LACTIC ACID: CPT

## 2019-01-19 PROCEDURE — 63600175 PHARM REV CODE 636 W HCPCS: Performed by: HOSPITALIST

## 2019-01-19 PROCEDURE — 87040 BLOOD CULTURE FOR BACTERIA: CPT | Mod: 59

## 2019-01-19 PROCEDURE — 96368 THER/DIAG CONCURRENT INF: CPT

## 2019-01-19 PROCEDURE — 93005 ELECTROCARDIOGRAM TRACING: CPT

## 2019-01-19 PROCEDURE — 99285 EMERGENCY DEPT VISIT HI MDM: CPT | Mod: 25

## 2019-01-19 PROCEDURE — 84145 PROCALCITONIN (PCT): CPT

## 2019-01-19 PROCEDURE — 25000003 PHARM REV CODE 250: Performed by: NURSE PRACTITIONER

## 2019-01-19 PROCEDURE — 83880 ASSAY OF NATRIURETIC PEPTIDE: CPT

## 2019-01-19 PROCEDURE — 96365 THER/PROPH/DIAG IV INF INIT: CPT

## 2019-01-19 PROCEDURE — 96375 TX/PRO/DX INJ NEW DRUG ADDON: CPT

## 2019-01-19 PROCEDURE — 80053 COMPREHEN METABOLIC PANEL: CPT

## 2019-01-19 RX ORDER — ACETAMINOPHEN 500 MG
1000 TABLET ORAL
Status: COMPLETED | OUTPATIENT
Start: 2019-01-19 | End: 2019-01-19

## 2019-01-19 RX ORDER — IBUPROFEN 200 MG
16 TABLET ORAL
Status: DISCONTINUED | OUTPATIENT
Start: 2019-01-19 | End: 2019-01-22 | Stop reason: HOSPADM

## 2019-01-19 RX ORDER — SODIUM CHLORIDE 0.9 % (FLUSH) 0.9 %
5 SYRINGE (ML) INJECTION
Status: DISCONTINUED | OUTPATIENT
Start: 2019-01-19 | End: 2019-01-22 | Stop reason: HOSPADM

## 2019-01-19 RX ORDER — INSULIN ASPART 100 [IU]/ML
0-5 INJECTION, SOLUTION INTRAVENOUS; SUBCUTANEOUS
Status: DISCONTINUED | OUTPATIENT
Start: 2019-01-19 | End: 2019-01-22 | Stop reason: HOSPADM

## 2019-01-19 RX ORDER — POTASSIUM CHLORIDE 20 MEQ/15ML
40 SOLUTION ORAL
Status: DISCONTINUED | OUTPATIENT
Start: 2019-01-19 | End: 2019-01-22 | Stop reason: HOSPADM

## 2019-01-19 RX ORDER — RAMELTEON 8 MG/1
8 TABLET ORAL NIGHTLY PRN
Status: DISCONTINUED | OUTPATIENT
Start: 2019-01-19 | End: 2019-01-22 | Stop reason: HOSPADM

## 2019-01-19 RX ORDER — IBUPROFEN 200 MG
24 TABLET ORAL
Status: DISCONTINUED | OUTPATIENT
Start: 2019-01-19 | End: 2019-01-22 | Stop reason: HOSPADM

## 2019-01-19 RX ORDER — METHYLPREDNISOLONE SOD SUCC 125 MG
80 VIAL (EA) INJECTION ONCE
Status: COMPLETED | OUTPATIENT
Start: 2019-01-19 | End: 2019-01-19

## 2019-01-19 RX ORDER — AMOXICILLIN 250 MG
1 CAPSULE ORAL 2 TIMES DAILY PRN
Status: DISCONTINUED | OUTPATIENT
Start: 2019-01-19 | End: 2019-01-20 | Stop reason: SDUPTHER

## 2019-01-19 RX ORDER — POTASSIUM CHLORIDE 20 MEQ/15ML
60 SOLUTION ORAL
Status: DISCONTINUED | OUTPATIENT
Start: 2019-01-19 | End: 2019-01-22 | Stop reason: HOSPADM

## 2019-01-19 RX ORDER — IPRATROPIUM BROMIDE AND ALBUTEROL SULFATE 2.5; .5 MG/3ML; MG/3ML
3 SOLUTION RESPIRATORY (INHALATION) EVERY 4 HOURS PRN
Status: DISCONTINUED | OUTPATIENT
Start: 2019-01-19 | End: 2019-01-22 | Stop reason: HOSPADM

## 2019-01-19 RX ORDER — GLUCAGON 1 MG
1 KIT INJECTION
Status: DISCONTINUED | OUTPATIENT
Start: 2019-01-19 | End: 2019-01-22 | Stop reason: HOSPADM

## 2019-01-19 RX ORDER — ONDANSETRON 2 MG/ML
4 INJECTION INTRAMUSCULAR; INTRAVENOUS EVERY 4 HOURS PRN
Status: DISCONTINUED | OUTPATIENT
Start: 2019-01-19 | End: 2019-01-22 | Stop reason: HOSPADM

## 2019-01-19 RX ORDER — ENOXAPARIN SODIUM 100 MG/ML
40 INJECTION SUBCUTANEOUS EVERY 24 HOURS
Status: DISCONTINUED | OUTPATIENT
Start: 2019-01-19 | End: 2019-01-19

## 2019-01-19 RX ORDER — ACETAMINOPHEN 325 MG/1
650 TABLET ORAL EVERY 4 HOURS PRN
Status: DISCONTINUED | OUTPATIENT
Start: 2019-01-19 | End: 2019-01-22 | Stop reason: HOSPADM

## 2019-01-19 RX ADMIN — METHYLPREDNISOLONE SODIUM SUCCINATE 80 MG: 125 INJECTION, POWDER, FOR SOLUTION INTRAMUSCULAR; INTRAVENOUS at 07:01

## 2019-01-19 RX ADMIN — SODIUM CHLORIDE 500 ML: 0.9 INJECTION, SOLUTION INTRAVENOUS at 06:01

## 2019-01-19 RX ADMIN — CEFTRIAXONE 1 G: 1 INJECTION, SOLUTION INTRAVENOUS at 05:01

## 2019-01-19 RX ADMIN — ACETAMINOPHEN 1000 MG: 500 TABLET ORAL at 05:01

## 2019-01-19 RX ADMIN — Medication 500 MG: at 05:01

## 2019-01-19 NOTE — ED NOTES
AAOx4, skin warm/dry, respirations even/unlabored.  Appears in mild distress, with coarse, wet cough.

## 2019-01-19 NOTE — ED PROVIDER NOTES
"Encounter Date: 1/19/2019    SCRIBE #1 NOTE: ITaco, am scribing for, and in the presence of, Leydi Edward NP .   SCRIBE #2 NOTE: IGeovanny, am scribing for, and in the presence of, Dr. Morton. the EKG reading.     History     Chief Complaint   Patient presents with    Headache     Pt c/o headache onset this AM. Also c/o tremors/chills/ and a prod cough.     Cough       Time seen by provider: 3:50 PM on 01/19/2019    Nelly Tian is a 68 y.o. female with a hx of COPD, HTN, heart failure, DM and who presents to the ED with c/o HA, fever, and chills. Associated sx are chest congestion and productive cough x 2 weeks. She is on home oxygen at night but has been using it more often throughout the day. Pt also reports CP associated with coughing. Pt has had wounds on the left side x 9 months for which she is followed by home health and wounds care. The dressings were changes yesterday. Pt notes the redness to the area is "about the same." Pt has not taken any Tylenol or Motrin today. She was seen by PCP a few weeks ago and has been taking cough syrup. She denies nausea, vomiting, diarrhea, sore throat and ear pain. Allergens include Dilaudid.       The history is provided by the patient.     Review of patient's allergies indicates:   Allergen Reactions    Dilaudid [hydromorphone] Anaphylaxis     Other reaction(s): Anaphylaxis  Other reaction(s): Unknown    Zyvox [linezolid in dextrose 5%] Shortness Of Breath     Past Medical History:   Diagnosis Date    *Atrial flutter     Angina pectoris 9/18/2017    Anxiety     Arthritis     Asthma     Atrial fibrillation     Back pain     Cataract     OD    CHF (congestive heart failure)     COPD (chronic obstructive pulmonary disease)     Depression     Diabetes mellitus     Emphysema of lung     Heart failure     Hepatomegaly 2/3/2016    Hernia     History of MI (myocardial infarction) 1/19/2016    Hypercapnic respiratory failure, chronic " 2016    Hyperlipidemia     Hypertension     Iron deficiency anemia 2/3/2016    Myocardial infarction     Obesity     Peripheral vascular disease     Pneumonia     Polyneuropathy     Retinal detachment     OS    Septic shock 2017    Skin ulcer 3/18/2017    Tobacco dependence     Type II 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, not stated as uncontrolled(250.60)      Past Surgical History:   Procedure Laterality Date    BREAST BIOPSY Left 10 yrs ago    benign    CATARACT EXTRACTION Left     OS      SECTION      x2    EYE SURGERY      HYSTERECTOMY      partial    OOPHORECTOMY      one ovary conserved    RETINAL DETACHMENT SURGERY      buckle --OS    TONSILLECTOMY       Family History   Problem Relation Age of Onset    Arthritis Father     Heart disease Father     Early death Sister 30        heart disease    Heart disease Sister 32        MI    Cancer Brother         Lung cancer    No Known Problems Daughter     Blindness Son         due to accident//    Arthritis Sister     Heart disease Sister     Crohn's disease Sister     Alcohol abuse Mother     Breast cancer Neg Hx     Glaucoma Neg Hx     Macular degeneration Neg Hx     Retinal detachment Neg Hx     Amblyopia Neg Hx     Diabetes Neg Hx     Cataracts Neg Hx     Hypertension Neg Hx     Strabismus Neg Hx     Stroke Neg Hx     Thyroid disease Neg Hx      Social History     Tobacco Use    Smoking status: Current Some Day Smoker     Packs/day: 0.25     Years: 50.00     Pack years: 12.50     Types: Cigarettes     Start date: 1968    Smokeless tobacco: Never Used   Substance Use Topics    Alcohol use: No     Alcohol/week: 0.0 oz     Frequency: Never    Drug use: No     Review of Systems   Constitutional: Positive for chills and fatigue. Negative for fever.   HENT: Positive for congestion. Negative for ear pain, sore throat and trouble swallowing.    Respiratory: Positive  for cough and shortness of breath. Negative for wheezing and stridor.    Cardiovascular: Positive for chest pain. Negative for leg swelling.   Gastrointestinal: Negative for diarrhea, nausea and vomiting.   Genitourinary: Negative for dysuria.   Musculoskeletal: Negative for back pain.   Skin: Positive for color change (area of redness) and wound. Negative for rash.   Neurological: Positive for headaches. Negative for weakness.   Hematological: Does not bruise/bleed easily.       Physical Exam     Vitals:    01/19/19 1755   BP: 119/78   Pulse: 100   Resp: (!) 24   Temp: 100 °F (37.8 °C)       Physical Exam    Nursing note and vitals reviewed.  Constitutional: She appears well-developed and well-nourished. She is Obese . She is active. She appears ill.   Morbidly obese. Chronically ill appearing.    HENT:   Head: Normocephalic and atraumatic.   Eyes: Conjunctivae, EOM and lids are normal. Pupils are equal, round, and reactive to light. Right eye exhibits no chemosis and no discharge. Left eye exhibits no chemosis and no discharge. Right conjunctiva is not injected. Left conjunctiva is not injected.   Neck: Trachea normal and normal range of motion. Neck supple. No stridor present. No neck rigidity.   Cardiovascular: Normal rate, regular rhythm, normal heart sounds, intact distal pulses and normal pulses. Exam reveals no distant heart sounds and no friction rub.    No murmur heard.  Pulses:       Radial pulses are 2+ on the right side, and 2+ on the left side.        Dorsalis pedis pulses are 2+ on the right side, and 2+ on the left side.   Pulmonary/Chest: Breath sounds normal. No respiratory distress. She has no wheezes. She has no rhonchi. She has no rales.   Bilateral breath sounds clear but prosper lobes diminished. Pt on oxygen.   Abdominal: Soft. Normal appearance and bowel sounds are normal. She exhibits no distension. There is no tenderness. There is no rigidity, no rebound, no guarding and no CVA tenderness.    Musculoskeletal: Normal range of motion.   Lymphadenopathy:        Head (right side): No submental, no submandibular, no preauricular and no posterior auricular adenopathy present.        Head (left side): No submental, no submandibular, no preauricular and no posterior auricular adenopathy present.   Neurological: She is alert and oriented to person, place, and time. She has normal strength. No cranial nerve deficit or sensory deficit. Gait normal. GCS score is 15. GCS eye subscore is 4. GCS verbal subscore is 5. GCS motor subscore is 6.   Skin: Skin is warm, dry and intact. Capillary refill takes less than 2 seconds. No rash noted. There is erythema.   Left lower abd wounds, dressings in place. Erythema and warmth around outer edges of wounds.    Psychiatric: She has a normal mood and affect. Her speech is normal and behavior is normal. Thought content normal.         ED Course   Procedures  Labs Reviewed   CBC W/ AUTO DIFFERENTIAL - Abnormal; Notable for the following components:       Result Value    WBC 14.10 (*)     MCH 25.6 (*)     MCHC 30.5 (*)     RDW 17.0 (*)     MPV 8.7 (*)     Gran # (ANC) 12.0 (*)     Gran% 85.1 (*)     Lymph% 6.8 (*)     All other components within normal limits   COMPREHENSIVE METABOLIC PANEL - Abnormal; Notable for the following components:    CO2 34 (*)     Albumin 3.3 (*)     All other components within normal limits   LACTIC ACID, PLASMA - Abnormal; Notable for the following components:    Lactate (Lactic Acid) 2.7 (*)     All other components within normal limits   CULTURE, BLOOD   CULTURE, BLOOD   INFLUENZA A AND B ANTIGEN   B-TYPE NATRIURETIC PEPTIDE     EKG Readings: (Independently Interpreted)   A-fib at a rate of 95 bpm. Non-specific ST changes. Normal QRS interval. No stemi       Imaging Results          X-Ray Chest PA And Lateral (Final result)     Abnormal  Result time 01/19/19 16:20:58    Final result by Zan Bradshaw MD (01/19/19 16:20:58)                  Impression:      1. Developing infiltrate or atelectasis in the right lower lobe with minimal left basilar atelectasis or scar.  This report was flagged in Epic as abnormal.      Electronically signed by: Zan Bradshaw MD  Date:    01/19/2019  Time:    16:20             Narrative:    EXAMINATION:  XR CHEST PA AND LATERAL    CLINICAL HISTORY:  Cough;    TECHNIQUE:  PA and lateral views of the chest were performed.    COMPARISON:  Chest x-ray dated 11/09/2018    FINDINGS:  There is left basilar atelectasis or scar but there is a developing infiltrate or atelectasis in the right lung base which was not present previously.  There is no pneumothorax or evidence of congestive failure.                                 Medical Decision Making:   History:   Old Medical Records: I decided to obtain old medical records.  Differential Diagnosis:   Sepsis  Pneumonia  PE  ACS       APC / Resident Notes:   68 year old chronically ill appearing pt with multiple comorbidities presents with c/o cough x 2 weeks and fever/chills today. Chest xray showed developing right lower lobe infiltrates. WBC elevated. Lactic acid elevated. Blood cultures drawn. Pt given azithromycin 500mg IVPB and rocephin 1g IVPB in ED. Pt to be admitted to hospital medicine. I discussed the pt with Dr Davies who accepted pt. I discussed pt with Dr Morton who agrees with POC. Pt voices understanding and is agreeable to the plan.         Scribe Attestation:   Scribe #1: I performed the above scribed service and the documentation accurately describes the services I performed. I attest to the accuracy of the note.    I, ADINA Stinson, personally performed the services described in this documentation. All medical record entries made by the scribe were at my direction and in my presence.  I have reviewed the chart and agree that the record reflects my personal performance and is accurate and complete. ADINA Stinson.  6:14 PM 01/19/2019            Clinical Impression:     1. Pneumonia of right lower lobe due to infectious organism    2. SOB (shortness of breath)                                 Leydi Edward NP  01/19/19 1950

## 2019-01-20 PROBLEM — R65.20 SEVERE SEPSIS: Status: ACTIVE | Noted: 2019-01-20

## 2019-01-20 PROBLEM — G89.4 CHRONIC PAIN SYNDROME: Status: ACTIVE | Noted: 2019-01-20

## 2019-01-20 PROBLEM — J18.9 COMMUNITY ACQUIRED PNEUMONIA OF RIGHT LOWER LOBE OF LUNG: Status: ACTIVE | Noted: 2019-01-19

## 2019-01-20 PROBLEM — A41.9 SEVERE SEPSIS: Status: ACTIVE | Noted: 2019-01-20

## 2019-01-20 PROBLEM — S31.109A WOUND, OPEN, ABDOMINAL WALL, LATERAL: Status: ACTIVE | Noted: 2019-01-20

## 2019-01-20 LAB
ALBUMIN SERPL BCP-MCNC: 2.8 G/DL
ALP SERPL-CCNC: 64 U/L
ALT SERPL W/O P-5'-P-CCNC: 13 U/L
ANION GAP SERPL CALC-SCNC: 7 MMOL/L
ANION GAP SERPL CALC-SCNC: 7 MMOL/L
AST SERPL-CCNC: 16 U/L
BASOPHILS # BLD AUTO: 0 K/UL
BASOPHILS # BLD AUTO: 0 K/UL
BASOPHILS NFR BLD: 0 %
BASOPHILS NFR BLD: 0 %
BILIRUB SERPL-MCNC: 0.3 MG/DL
BILIRUB UR QL STRIP: NEGATIVE
BUN SERPL-MCNC: 12 MG/DL
BUN SERPL-MCNC: 12 MG/DL
CALCIUM SERPL-MCNC: 9.4 MG/DL
CALCIUM SERPL-MCNC: 9.4 MG/DL
CHLORIDE SERPL-SCNC: 98 MMOL/L
CHLORIDE SERPL-SCNC: 98 MMOL/L
CLARITY UR: CLEAR
CO2 SERPL-SCNC: 31 MMOL/L
CO2 SERPL-SCNC: 31 MMOL/L
COLOR UR: YELLOW
CREAT SERPL-MCNC: 0.7 MG/DL
CREAT SERPL-MCNC: 0.7 MG/DL
DIFFERENTIAL METHOD: ABNORMAL
DIFFERENTIAL METHOD: ABNORMAL
EOSINOPHIL # BLD AUTO: 0 K/UL
EOSINOPHIL # BLD AUTO: 0 K/UL
EOSINOPHIL NFR BLD: 0 %
EOSINOPHIL NFR BLD: 0 %
ERYTHROCYTE [DISTWIDTH] IN BLOOD BY AUTOMATED COUNT: 16.9 %
ERYTHROCYTE [DISTWIDTH] IN BLOOD BY AUTOMATED COUNT: 16.9 %
EST. GFR  (AFRICAN AMERICAN): >60 ML/MIN/1.73 M^2
EST. GFR  (AFRICAN AMERICAN): >60 ML/MIN/1.73 M^2
EST. GFR  (NON AFRICAN AMERICAN): >60 ML/MIN/1.73 M^2
EST. GFR  (NON AFRICAN AMERICAN): >60 ML/MIN/1.73 M^2
ESTIMATED AVG GLUCOSE: 114 MG/DL
GLUCOSE SERPL-MCNC: 158 MG/DL
GLUCOSE SERPL-MCNC: 158 MG/DL
GLUCOSE UR QL STRIP: NEGATIVE
HBA1C MFR BLD HPLC: 5.6 %
HCT VFR BLD AUTO: 35.3 %
HCT VFR BLD AUTO: 35.3 %
HGB BLD-MCNC: 11 G/DL
HGB BLD-MCNC: 11 G/DL
HGB UR QL STRIP: NEGATIVE
KETONES UR QL STRIP: NEGATIVE
LEUKOCYTE ESTERASE UR QL STRIP: NEGATIVE
LYMPHOCYTES # BLD AUTO: 0.8 K/UL
LYMPHOCYTES # BLD AUTO: 0.8 K/UL
LYMPHOCYTES NFR BLD: 8.3 %
LYMPHOCYTES NFR BLD: 8.3 %
MAGNESIUM SERPL-MCNC: 2.1 MG/DL
MCH RBC QN AUTO: 26.2 PG
MCH RBC QN AUTO: 26.2 PG
MCHC RBC AUTO-ENTMCNC: 31.1 G/DL
MCHC RBC AUTO-ENTMCNC: 31.1 G/DL
MCV RBC AUTO: 84 FL
MCV RBC AUTO: 84 FL
MONOCYTES # BLD AUTO: 0 K/UL
MONOCYTES # BLD AUTO: 0 K/UL
MONOCYTES NFR BLD: 0.5 %
MONOCYTES NFR BLD: 0.5 %
NEUTROPHILS # BLD AUTO: 9.2 K/UL
NEUTROPHILS # BLD AUTO: 9.2 K/UL
NEUTROPHILS NFR BLD: 91.2 %
NEUTROPHILS NFR BLD: 91.2 %
NITRITE UR QL STRIP: NEGATIVE
PH UR STRIP: 6 [PH] (ref 5–8)
PHOSPHATE SERPL-MCNC: 3.3 MG/DL
PLATELET # BLD AUTO: 209 K/UL
PLATELET # BLD AUTO: 209 K/UL
PMV BLD AUTO: 8.9 FL
PMV BLD AUTO: 8.9 FL
POCT GLUCOSE: 186 MG/DL (ref 70–110)
POCT GLUCOSE: 242 MG/DL (ref 70–110)
POCT GLUCOSE: 251 MG/DL (ref 70–110)
POTASSIUM SERPL-SCNC: 4.8 MMOL/L
POTASSIUM SERPL-SCNC: 4.8 MMOL/L
PROCALCITONIN SERPL IA-MCNC: 0.09 NG/ML
PROT SERPL-MCNC: 6.7 G/DL
PROT UR QL STRIP: ABNORMAL
RBC # BLD AUTO: 4.19 M/UL
RBC # BLD AUTO: 4.19 M/UL
SODIUM SERPL-SCNC: 136 MMOL/L
SODIUM SERPL-SCNC: 136 MMOL/L
SP GR UR STRIP: 1.02 (ref 1–1.03)
URN SPEC COLLECT METH UR: ABNORMAL
UROBILINOGEN UR STRIP-ACNC: NEGATIVE EU/DL
WBC # BLD AUTO: 10.1 K/UL
WBC # BLD AUTO: 10.1 K/UL

## 2019-01-20 PROCEDURE — 99900035 HC TECH TIME PER 15 MIN (STAT)

## 2019-01-20 PROCEDURE — 94640 AIRWAY INHALATION TREATMENT: CPT

## 2019-01-20 PROCEDURE — 36415 COLL VENOUS BLD VENIPUNCTURE: CPT

## 2019-01-20 PROCEDURE — S4991 NICOTINE PATCH NONLEGEND: HCPCS | Performed by: NURSE PRACTITIONER

## 2019-01-20 PROCEDURE — 80053 COMPREHEN METABOLIC PANEL: CPT

## 2019-01-20 PROCEDURE — 81003 URINALYSIS AUTO W/O SCOPE: CPT

## 2019-01-20 PROCEDURE — 63600175 PHARM REV CODE 636 W HCPCS: Performed by: NURSE PRACTITIONER

## 2019-01-20 PROCEDURE — 94761 N-INVAS EAR/PLS OXIMETRY MLT: CPT

## 2019-01-20 PROCEDURE — 83735 ASSAY OF MAGNESIUM: CPT

## 2019-01-20 PROCEDURE — 25000003 PHARM REV CODE 250: Performed by: NURSE PRACTITIONER

## 2019-01-20 PROCEDURE — 27100107 HC POCKET PEAK FLOW METER

## 2019-01-20 PROCEDURE — 27000221 HC OXYGEN, UP TO 24 HOURS

## 2019-01-20 PROCEDURE — 25000242 PHARM REV CODE 250 ALT 637 W/ HCPCS: Performed by: NURSE PRACTITIONER

## 2019-01-20 PROCEDURE — 85025 COMPLETE CBC W/AUTO DIFF WBC: CPT

## 2019-01-20 PROCEDURE — 87070 CULTURE OTHR SPECIMN AEROBIC: CPT

## 2019-01-20 PROCEDURE — 12000002 HC ACUTE/MED SURGE SEMI-PRIVATE ROOM

## 2019-01-20 PROCEDURE — 84100 ASSAY OF PHOSPHORUS: CPT

## 2019-01-20 PROCEDURE — 87205 SMEAR GRAM STAIN: CPT

## 2019-01-20 RX ORDER — SPIRONOLACTONE 25 MG/1
25 TABLET ORAL DAILY
Status: DISCONTINUED | OUTPATIENT
Start: 2019-01-20 | End: 2019-01-22 | Stop reason: HOSPADM

## 2019-01-20 RX ORDER — FLUTICASONE FUROATE AND VILANTEROL 100; 25 UG/1; UG/1
1 POWDER RESPIRATORY (INHALATION) DAILY
Status: DISCONTINUED | OUTPATIENT
Start: 2019-01-20 | End: 2019-01-20 | Stop reason: SDUPTHER

## 2019-01-20 RX ORDER — PREDNISONE 20 MG/1
40 TABLET ORAL DAILY
Status: DISCONTINUED | OUTPATIENT
Start: 2019-01-20 | End: 2019-01-22 | Stop reason: HOSPADM

## 2019-01-20 RX ORDER — LISINOPRIL 10 MG/1
20 TABLET ORAL DAILY
Status: DISCONTINUED | OUTPATIENT
Start: 2019-01-20 | End: 2019-01-22 | Stop reason: HOSPADM

## 2019-01-20 RX ORDER — AMOXICILLIN 250 MG
1 CAPSULE ORAL 2 TIMES DAILY PRN
Status: DISCONTINUED | OUTPATIENT
Start: 2019-01-20 | End: 2019-01-22 | Stop reason: HOSPADM

## 2019-01-20 RX ORDER — ATORVASTATIN CALCIUM 20 MG/1
20 TABLET, FILM COATED ORAL DAILY
Status: DISCONTINUED | OUTPATIENT
Start: 2019-01-20 | End: 2019-01-22 | Stop reason: HOSPADM

## 2019-01-20 RX ORDER — HYDROCODONE BITARTRATE AND ACETAMINOPHEN 10; 325 MG/1; MG/1
1 TABLET ORAL EVERY 8 HOURS PRN
Status: DISCONTINUED | OUTPATIENT
Start: 2019-01-20 | End: 2019-01-22 | Stop reason: HOSPADM

## 2019-01-20 RX ORDER — CLONAZEPAM 1 MG/1
1 TABLET ORAL 2 TIMES DAILY PRN
Status: DISCONTINUED | OUTPATIENT
Start: 2019-01-20 | End: 2019-01-22 | Stop reason: HOSPADM

## 2019-01-20 RX ORDER — PANTOPRAZOLE SODIUM 40 MG/1
40 TABLET, DELAYED RELEASE ORAL DAILY
Status: DISCONTINUED | OUTPATIENT
Start: 2019-01-20 | End: 2019-01-22 | Stop reason: HOSPADM

## 2019-01-20 RX ORDER — FUROSEMIDE 40 MG/1
40 TABLET ORAL DAILY
Status: DISCONTINUED | OUTPATIENT
Start: 2019-01-20 | End: 2019-01-22 | Stop reason: HOSPADM

## 2019-01-20 RX ORDER — POTASSIUM CHLORIDE 20 MEQ/1
20 TABLET, EXTENDED RELEASE ORAL DAILY
Status: DISCONTINUED | OUTPATIENT
Start: 2019-01-20 | End: 2019-01-22 | Stop reason: HOSPADM

## 2019-01-20 RX ORDER — FLUTICASONE PROPIONATE 50 MCG
1 SPRAY, SUSPENSION (ML) NASAL DAILY
Status: DISCONTINUED | OUTPATIENT
Start: 2019-01-20 | End: 2019-01-22 | Stop reason: HOSPADM

## 2019-01-20 RX ORDER — ASCORBIC ACID 500 MG
1000 TABLET ORAL 2 TIMES DAILY
Status: DISCONTINUED | OUTPATIENT
Start: 2019-01-20 | End: 2019-01-22 | Stop reason: HOSPADM

## 2019-01-20 RX ORDER — TRAZODONE HYDROCHLORIDE 50 MG/1
100 TABLET ORAL NIGHTLY
Status: DISCONTINUED | OUTPATIENT
Start: 2019-01-20 | End: 2019-01-22 | Stop reason: HOSPADM

## 2019-01-20 RX ORDER — FLUTICASONE FUROATE AND VILANTEROL 100; 25 UG/1; UG/1
1 POWDER RESPIRATORY (INHALATION) DAILY
Status: DISCONTINUED | OUTPATIENT
Start: 2019-01-20 | End: 2019-01-22 | Stop reason: HOSPADM

## 2019-01-20 RX ORDER — IBUPROFEN 200 MG
1 TABLET ORAL DAILY
Status: DISCONTINUED | OUTPATIENT
Start: 2019-01-20 | End: 2019-01-22 | Stop reason: HOSPADM

## 2019-01-20 RX ORDER — GABAPENTIN 400 MG/1
800 CAPSULE ORAL 3 TIMES DAILY
Status: DISCONTINUED | OUTPATIENT
Start: 2019-01-20 | End: 2019-01-22 | Stop reason: HOSPADM

## 2019-01-20 RX ADMIN — NICOTINE 1 PATCH: 14 PATCH, EXTENDED RELEASE TRANSDERMAL at 08:01

## 2019-01-20 RX ADMIN — CLONAZEPAM 1 MG: 1 TABLET ORAL at 10:01

## 2019-01-20 RX ADMIN — HYDROCODONE BITARTRATE AND ACETAMINOPHEN 1 TABLET: 10; 325 TABLET ORAL at 04:01

## 2019-01-20 RX ADMIN — OXYCODONE HYDROCHLORIDE AND ACETAMINOPHEN 1000 MG: 500 TABLET ORAL at 12:01

## 2019-01-20 RX ADMIN — FUROSEMIDE 40 MG: 40 TABLET ORAL at 08:01

## 2019-01-20 RX ADMIN — APIXABAN 5 MG: 2.5 TABLET, FILM COATED ORAL at 08:01

## 2019-01-20 RX ADMIN — THERA TABS 1 TABLET: TAB at 08:01

## 2019-01-20 RX ADMIN — PANTOPRAZOLE SODIUM 40 MG: 40 TABLET, DELAYED RELEASE ORAL at 08:01

## 2019-01-20 RX ADMIN — IPRATROPIUM BROMIDE AND ALBUTEROL SULFATE 3 ML: .5; 3 SOLUTION RESPIRATORY (INHALATION) at 07:01

## 2019-01-20 RX ADMIN — APIXABAN 5 MG: 2.5 TABLET, FILM COATED ORAL at 09:01

## 2019-01-20 RX ADMIN — TRAZODONE HYDROCHLORIDE 100 MG: 50 TABLET ORAL at 09:01

## 2019-01-20 RX ADMIN — ATORVASTATIN CALCIUM 20 MG: 20 TABLET, FILM COATED ORAL at 08:01

## 2019-01-20 RX ADMIN — IPRATROPIUM BROMIDE AND ALBUTEROL SULFATE 3 ML: .5; 3 SOLUTION RESPIRATORY (INHALATION) at 11:01

## 2019-01-20 RX ADMIN — INSULIN ASPART 1 UNITS: 100 INJECTION, SOLUTION INTRAVENOUS; SUBCUTANEOUS at 09:01

## 2019-01-20 RX ADMIN — INSULIN ASPART 3 UNITS: 100 INJECTION, SOLUTION INTRAVENOUS; SUBCUTANEOUS at 05:01

## 2019-01-20 RX ADMIN — OXYCODONE HYDROCHLORIDE AND ACETAMINOPHEN 1000 MG: 500 TABLET ORAL at 09:01

## 2019-01-20 RX ADMIN — OXYCODONE HYDROCHLORIDE AND ACETAMINOPHEN 1000 MG: 500 TABLET ORAL at 08:01

## 2019-01-20 RX ADMIN — GABAPENTIN 800 MG: 400 CAPSULE ORAL at 08:01

## 2019-01-20 RX ADMIN — GABAPENTIN 800 MG: 400 CAPSULE ORAL at 02:01

## 2019-01-20 RX ADMIN — CEFTRIAXONE 2 G: 2 INJECTION, SOLUTION INTRAVENOUS at 04:01

## 2019-01-20 RX ADMIN — LISINOPRIL 20 MG: 10 TABLET ORAL at 08:01

## 2019-01-20 RX ADMIN — FLUTICASONE PROPIONATE 50 MCG: 50 SPRAY, METERED NASAL at 08:01

## 2019-01-20 RX ADMIN — GABAPENTIN 800 MG: 400 CAPSULE ORAL at 09:01

## 2019-01-20 RX ADMIN — POTASSIUM CHLORIDE 20 MEQ: 20 TABLET, EXTENDED RELEASE ORAL at 08:01

## 2019-01-20 RX ADMIN — FLUTICASONE FUROATE AND VILANTEROL TRIFENATATE 1 PUFF: 100; 25 POWDER RESPIRATORY (INHALATION) at 10:01

## 2019-01-20 RX ADMIN — SPIRONOLACTONE 25 MG: 25 TABLET ORAL at 08:01

## 2019-01-20 RX ADMIN — TRAZODONE HYDROCHLORIDE 100 MG: 50 TABLET ORAL at 12:01

## 2019-01-20 RX ADMIN — METOPROLOL SUCCINATE 12.5 MG: 25 TABLET, EXTENDED RELEASE ORAL at 10:01

## 2019-01-20 RX ADMIN — IPRATROPIUM BROMIDE AND ALBUTEROL SULFATE 3 ML: .5; 3 SOLUTION RESPIRATORY (INHALATION) at 03:01

## 2019-01-20 RX ADMIN — PREDNISONE 40 MG: 20 TABLET ORAL at 10:01

## 2019-01-20 RX ADMIN — AZITHROMYCIN MONOHYDRATE 250 MG: 500 INJECTION, POWDER, LYOPHILIZED, FOR SOLUTION INTRAVENOUS at 04:01

## 2019-01-20 RX ADMIN — CLONAZEPAM 1 MG: 1 TABLET ORAL at 09:01

## 2019-01-20 RX ADMIN — HYDROCODONE BITARTRATE AND ACETAMINOPHEN 1 TABLET: 10; 325 TABLET ORAL at 02:01

## 2019-01-20 NOTE — PROGRESS NOTES
[facility] Hospital Medicine  Progress Note    Patient Name: Nelly Tian  MRN: [id number]  Patient Class: IP- Inpatient   Admission Date: 1/19/2019  Length of Stay: 1 days  Attending Physician: Micki Davies MD  Primary Care Provider: Constantine Bird MD        Subjective:     Principal Problem:Severe sepsis    HPI:  Ms. Tian is a 67yo F with a PMH of COPD with Asthma, CHF, HTN, DM2, A fib, MI, Morbid Obesity, and Current Smoker. She presents to the ED with c/o Cough. It strated about 2 weeks ago and is occassionally productive. She went to her PCP on 12/28 with c/o cough and congestion and prescribed tessalon perles. She gets SOB with the cough and has been using her night time O2 all day. Associated symptoms include HA, subjective fever, chills, and pleuritic chest pain. While in the ED, she met severe sepsis criteria and her CXR showed a RLL infiltrate. IV Rocephin, Azithromycin, and Solumedrol were initiated. Her Lactate was 2.7 and she was given a 500cc NS bolus. She currently reports breathing much better and wants to eat.        Hospital Course:  No notes on file    Interval History: no new issues. Still with dyspnea and wheezing requiring oxygen. On 3 liters NC    Review of Systems   Constitutional: Positive for activity change and fatigue. Negative for diaphoresis and fever.   Respiratory: Positive for cough, shortness of breath and wheezing.    Cardiovascular: Negative for chest pain and leg swelling.   Gastrointestinal: Negative for abdominal distention and abdominal pain.   Skin: Positive for color change (BLE chronic changes) and wound (left abdomen). Negative for rash.   Neurological: Positive for numbness. Negative for speech difficulty. Syncope: BLE.     Objective:     Vital Signs (Most Recent):  Temp: 96.1 °F (35.6 °C) (01/20/19 0751)  Pulse: (!) 59 (01/20/19 0751)  Resp: 18 (01/20/19 0751)  BP: (!) 113/53 (01/20/19 0751)  SpO2: (!) 92 % (01/20/19 0751) Vital Signs (24h  Range):  Temp:  [96.1 °F (35.6 °C)-102.3 °F (39.1 °C)] 96.1 °F (35.6 °C)  Pulse:  [] 59  Resp:  [18-26] 18  SpO2:  [79 %-96 %] 92 %  BP: (113-142)/(53-78) 113/53     Weight: 132.4 kg (291 lb 12.8 oz)  Body mass index is 48.56 kg/m².    Intake/Output Summary (Last 24 hours) at 1/20/2019 1009  Last data filed at 1/20/2019 0418  Gross per 24 hour   Intake 120 ml   Output 200 ml   Net -80 ml      Physical Exam   Constitutional: She is oriented to person, place, and time. She appears well-developed and well-nourished.   HENT:   Head: Normocephalic and atraumatic.   Mouth/Throat: Oropharynx is clear and moist.   Eyes: Conjunctivae and EOM are normal. Pupils are equal, round, and reactive to light.   Neck: Normal range of motion. Neck supple.   Cardiovascular: Normal rate, regular rhythm and intact distal pulses.   Murmur heard.  Irregular irregular    Pulmonary/Chest: Effort normal. She has wheezes.   Exp wheezes and rhonchi   Abdominal: Soft. Bowel sounds are normal. There is no tenderness.   Grossly protuberant    Musculoskeletal: Normal range of motion.   Neurological: She is alert and oriented to person, place, and time.   Decreased sensation to BLE   Skin: Skin is warm and dry.   Chronic vascular skin changes BLE, chronic abdominal ulcers to left lower abd-dressing intact. See nursing notes   Psychiatric: She has a normal mood and affect. Her behavior is normal. Judgment normal.   Nursing note and vitals reviewed.      Significant Labs:   CBC:   Recent Labs   Lab 01/19/19  1630 01/20/19  0430   WBC 14.10* 10.10  10.10   HGB 12.1 11.0*  11.0*   HCT 39.6 35.3*  35.3*    209  209     CMP:   Recent Labs   Lab 01/19/19  1630 01/20/19  0430    136  136   K 4.6 4.8  4.8   CL 97 98  98   CO2 34* 31*  31*   GLU 92 158*  158*   BUN 9 12  12   CREATININE 0.7 0.7  0.7   CALCIUM 9.8 9.4  9.4   PROT 7.6 6.7   ALBUMIN 3.3* 2.8*   BILITOT 0.3 0.3   ALKPHOS 86 64   AST 14 16   ALT 14 13   ANIONGAP 11 7*   7*   EGFRNONAA >60 >60  >60       Significant Imaging: I have reviewed and interpreted all pertinent imaging results/findings within the past 24 hours.    Assessment/Plan:      * Severe sepsis    Due to pneumonia  IV rocephin and Azithromycin  Lactic acid 2.7--> continue to trend  Given 500cc NS bolus in Ed--no more IVF due to CHF  Blood cultures pending  Obtain sputum culture  Monitor labs and VS     Wound, open, abdominal wall, lateral    Chronic  Dressing changed per home health yesterday  Consult wound care       Chronic pain syndrome    Continue home pain regimen       Community acquired pneumonia of right lower lobe of lung    IV Azithromycin and Rocephin  Obtain Sputum culture  Check Procalcitonin  Nebulizers/O2       Chronic combined systolic and diastolic CHF (congestive heart failure)    Stable  Continue chronic BB, ACEI, and diuretics  Monitor on telemetry  Monitor for volume overload       Hypercapnic respiratory failure, chronic    Continue home O2 2L       Hyperlipidemia associated with type 2 diabetes mellitus    Continue Statin         Type 2 diabetes mellitus with diabetic neuropathy, without long-term current use of insulin    Controlled. Hold Glucophage due to lactic acidosis  Monitor blood sugars QAC&HS  SSI prn  Diabetic diet  Continue Gabapentin  Lab Results   Component Value Date    HGBA1C 5.6 09/23/2018          PVD (peripheral vascular disease)    Continue statin       Hypertension associated with diabetes    Chronic/Controlled. Continue chronic medications, adjusting as needed.  Monitor VS       Morbid obesity with BMI of 40.0-44.9, adult    Body mass index is 48.56 kg/m². Morbid obesity complicates all aspects of disease management from diagnostic modalities to treatment. Weight loss encouraged and health benefits explained to patient.         Chronic atrial fibrillation    Continue Eliquis and BB  Monitor on telemetry       Tobacco dependency    Smoking cessation encouraged  Nicotine  patch  Dangers of cigarette smoking were reviewed with patient in detail for 10 minutes and patient was encouraged to quit. Nicotine replacement options were discussed.         MDD (major depressive disorder)    Stable. Continue Wellbutrin, Trazodone, and Cymbalta       Chronic obstructive pulmonary disease with acute exacerbation    Due to pneumonia  O2 therapy  Nebulizers  IV solumedrol  PEFR       Diabetes mellitus with neuropathy    As evidence by examination          VTE Risk Mitigation (From admission, onward)        Ordered     apixaban tablet 5 mg  2 times daily      01/20/19 0044     IP VTE HIGH RISK PATIENT  Once      01/19/19 2208     Place DEVEN hose  Until discontinued      01/19/19 4709              Ca Dupont NP  Department of Hospital Medicine   [facility]

## 2019-01-20 NOTE — H&P
[facility] Medicine  History & Physical    Patient Name: Nelly Tian  MRN: [id number]  Admission Date: 1/19/2019  Attending Physician: Micki Davies MD   Primary Care Provider: Constantine Bird MD         Patient information was obtained from patient, past medical records and ER records.     Subjective:     Principal Problem:Severe sepsis    Chief Complaint:   Chief Complaint   Patient presents with    Headache     Pt c/o headache onset this AM. Also c/o tremors/chills/ and a prod cough.     Cough        HPI: Ms. Tian is a 69yo F with a PMH of COPD with Asthma, CHF, HTN, DM2, A fib, MI, Morbid Obesity, and Current Smoker. She presents to the ED with c/o Cough. It strated about 2 weeks ago and is occassionally productive. She went to her PCP on 12/28 with c/o cough and congestion and prescribed tessalon perles. She gets SOB with the cough and has been using her night time O2 all day. Associated symptoms include HA, subjective fever, chills, and pleuritic chest pain. While in the ED, she met severe sepsis criteria and her CXR showed a RLL infiltrate. IV Rocephin, Azithromycin, and Solumedrol were initiated. Her Lactate was 2.7 and she was given a 500cc NS bolus. She currently reports breathing much better and wants to eat.        Past Medical History:   Diagnosis Date    *Atrial flutter     Angina pectoris 9/18/2017    Anxiety     Arthritis     Asthma     Atrial fibrillation     Back pain     Cataract     OD    CHF (congestive heart failure)     COPD (chronic obstructive pulmonary disease)     Depression     Diabetes mellitus     Emphysema of lung     Heart failure     Hepatomegaly 2/3/2016    Hernia     History of MI (myocardial infarction) 1/19/2016    Hypercapnic respiratory failure, chronic 11/16/2016    Hyperlipidemia     Hypertension     Iron deficiency anemia 2/3/2016    Myocardial infarction     Obesity     Peripheral vascular disease     Pneumonia      Polyneuropathy     Retinal detachment     OS    Septic shock 2017    Skin ulcer 3/18/2017    Tobacco dependence     Type II 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, not stated as uncontrolled(250.60)        Past Surgical History:   Procedure Laterality Date    BREAST BIOPSY Left 10 yrs ago    benign    CATARACT EXTRACTION Left     OS      SECTION      x2    EYE SURGERY      HYSTERECTOMY      partial    OOPHORECTOMY      one ovary conserved    RETINAL DETACHMENT SURGERY      buckle --OS    TONSILLECTOMY         Review of patient's allergies indicates:   Allergen Reactions    Dilaudid [hydromorphone] Anaphylaxis     Other reaction(s): Anaphylaxis  Other reaction(s): Unknown    Zyvox [linezolid in dextrose 5%] Shortness Of Breath       No current facility-administered medications on file prior to encounter.      Current Outpatient Medications on File Prior to Encounter   Medication Sig    albuterol (ACCUNEB) 0.63 mg/3 mL Nebu Take 3 mLs (0.63 mg total) by nebulization every 6 (six) hours as needed. Rescue    albuterol (VENTOLIN HFA) 90 mcg/actuation inhaler Inhale 2 puffs into the lungs every 6 (six) hours as needed. Rescue    apixaban (ELIQUIS) 5 mg Tab Take 1 tablet (5 mg total) by mouth 2 (two) times daily.    ascorbic acid, vitamin C, (VITAMIN C) 500 MG tablet Take 1 tablet (500 mg total) by mouth every evening. (Patient taking differently: Take 1,000 mg by mouth 2 (two) times daily. )    atorvastatin (LIPITOR) 20 MG tablet Take 1 tablet (20 mg total) by mouth once daily.    BREO ELLIPTA 100-25 mcg/dose diskus inhaler INHALE 1 PUFF INTO THE LUNGS ONCE DAILY.    budesonide (PULMICORT) 0.5 mg/2 mL nebulizer solution Take 2 mLs (0.5 mg total) by nebulization once daily. Controller    clonazePAM (KLONOPIN) 1 MG tablet 1 tab po bid prn    gabapentin (NEURONTIN) 800 MG tablet Take 1 tablet (800 mg total) by mouth 3 (three) times daily.     HYDROcodone-acetaminophen (NORCO)  mg per tablet Take 1 tablet by mouth every 8 (eight) hours as needed for Pain (Do not take more than 3 a day. Do not mix with other narcotics.).    levalbuterol (XOPENEX) 0.63 mg/3 mL nebulizer solution INHALE THE CONTENTS OF 1 VIAL BY NEBULIZATION 4 times  DAILY.    DX: J43.9    lisinopril (PRINIVIL,ZESTRIL) 20 MG tablet Take 1 tablet (20 mg total) by mouth once daily.    metFORMIN (GLUCOPHAGE) 500 MG tablet Take 1 tablet (500 mg total) by mouth 2 (two) times daily with meals.    metoprolol succinate (TOPROL-XL) 25 MG 24 hr tablet Take 0.5 tablets (12.5 mg total) by mouth once daily.    multivitamin (THERAGRAN) tablet Take 1 tablet by mouth once daily.    nystatin (NYSTOP) powder Apply topically 2 (two) times daily.    nystatin-triamcinolone (MYCOLOG II) cream Apply topically 3 (three) times daily.    omega-3 acid ethyl esters (LOVAZA) 1 gram capsule 2 (two) times daily.     omeprazole (PRILOSEC) 20 MG capsule Take 1 capsule (20 mg total) by mouth once daily.    ondansetron (ZOFRAN-ODT) 8 MG TbDL Take 1 tablet (8 mg total) by mouth every 6 (six) hours as needed.    potassium chloride SA (KLOR-CON M20) 20 MEQ tablet Take 1 tablet (20 mEq total) by mouth once daily.    senna-docusate 8.6-50 mg (PERICOLACE) 8.6-50 mg per tablet Take 1 tablet by mouth 2 (two) times daily as needed for Constipation.    spironolactone (ALDACTONE) 25 MG tablet Take 1 tablet (25 mg total) by mouth once daily.    traZODone (DESYREL) 100 MG tablet Take 1 tablet (100 mg total) by mouth every evening.    zaleplon (SONATA) 5 MG Cap Take 1 capsule (5 mg total) by mouth every evening.    buPROPion (WELLBUTRIN) 100 MG tablet Take 1 tablet (100 mg total) by mouth 2 (two) times daily.    diclofenac sodium 1.5 % Drop     DULoxetine (CYMBALTA) 30 MG capsule     fluticasone (FLONASE) 50 mcg/actuation nasal spray 1 spray (50 mcg total) by Each Nare route once daily.    furosemide (LASIX) 40 MG  tablet TAKE 1 TABLET ONE TIME DAILY  FOR  FLUID  OVERLOAD    loratadine (CLARITIN) 10 mg tablet Take 1 tablet (10 mg total) by mouth once daily.    polyethylene glycol (GLYCOLAX) 17 gram/dose powder      Family History     Problem Relation (Age of Onset)    Mother:   Father:      Alcohol abuse Mother    Arthritis Father, Sister    Blindness Son    Cancer Brother    Crohn's disease Sister    Early death Sister (30)    Heart disease Father, Sister (32), Sister    No Known Problems Daughter        Tobacco Use    Smoking status: Current Some Day Smoker     Packs/day: 0.25     Years: 50.00     Pack years: 12.50     Types: Cigarettes     Start date: 1968    Smokeless tobacco: Never Used   Substance and Sexual Activity    Alcohol use: No     Alcohol/week: 0.0 oz     Frequency: Never    Drug use: No    Sexual activity: Not Currently     Review of Systems   Constitutional: Positive for chills and fever. Negative for diaphoresis.   HENT: Positive for rhinorrhea. Negative for congestion.    Eyes: Negative for visual disturbance.   Respiratory: Positive for cough, chest tightness and shortness of breath.    Cardiovascular: Positive for chest pain.        Pleuritic chest pain.   Gastrointestinal: Negative for abdominal pain, diarrhea, nausea and vomiting.   Genitourinary: Negative for dysuria and hematuria.   Musculoskeletal: Negative for arthralgias.   Skin: Positive for wound.        Chronic abdominal wounds   Neurological: Positive for headaches. Negative for dizziness and syncope.   Hematological: Bruises/bleeds easily.        On eliquis   Psychiatric/Behavioral: Negative for confusion and hallucinations.     Objective:     Vital Signs (Most Recent):  Temp: 97.2 °F (36.2 °C) (19)  Pulse: 95 (19)  Resp: (!) 22 (19)  BP: (!) 125/53 (19)  SpO2: (!) 91 % (19) Vital Signs (24h Range):  Temp:  [97.2 °F (36.2 °C)-102.3 °F (39.1 °C)] 97.2 °F (36.2  °C)  Pulse:  [] 95  Resp:  [20-26] 22  SpO2:  [79 %-96 %] 91 %  BP: (119-142)/(53-78) 125/53     Weight: 122.5 kg (270 lb)  Body mass index is 41.05 kg/m².    Physical Exam   Constitutional: She is oriented to person, place, and time. No distress.   Obese   HENT:   Head: Normocephalic.   Eyes: Pupils are equal, round, and reactive to light.   Neck: Normal range of motion. Neck supple. No JVD present. No tracheal deviation present.   Cardiovascular: Regular rhythm, normal heart sounds and intact distal pulses. Tachycardia present.   Pulmonary/Chest: Effort normal. No respiratory distress.   Diminished breath sound throughout   Abdominal: Soft. Bowel sounds are normal. There is no tenderness.   Grade 4-5 pannus   Musculoskeletal: Normal range of motion. She exhibits no edema.   Neurological: She is alert and oriented to person, place, and time. No cranial nerve deficit.   Skin: Skin is warm and dry. Capillary refill takes less than 2 seconds.   Petechia to right lower leg. Left lateral abdominal dressings intact.         CRANIAL NERVES     CN III, IV, VI   Pupils are equal, round, and reactive to light.       Significant Labs:   CBC:   Recent Labs   Lab 01/19/19  1630   WBC 14.10*   HGB 12.1   HCT 39.6        CMP:   Recent Labs   Lab 01/19/19  1630      K 4.6   CL 97   CO2 34*   GLU 92   BUN 9   CREATININE 0.7   CALCIUM 9.8   PROT 7.6   ALBUMIN 3.3*   BILITOT 0.3   ALKPHOS 86   AST 14   ALT 14   ANIONGAP 11   EGFRNONAA >60     Cardiac Markers:   Recent Labs   Lab 01/19/19  1826   *     Lactic Acid:   Recent Labs   Lab 01/19/19  1630 01/19/19  2232   LACTATE 2.7* 1.2     POCT Glucose:   Recent Labs   Lab 01/19/19  2217   POCTGLUCOSE 149*     Troponin:   Recent Labs   Lab 01/19/19  2232   TROPONINI 0.012     Influenza A&B: negative    Microbiology Results (last 7 days)     Procedure Component Value Units Date/Time    Blood culture #1 [[id number]] Collected:  01/19/19 1645    Order Status:  Sent  Specimen:  Blood Updated:  01/19/19 2355    Blood culture #2 [[id number]] Collected:  01/19/19 1659    Order Status:  Sent Specimen:  Blood Updated:  01/19/19 2355    Culture, Respiratory with Gram Stain [[id number]]     Order Status:  No result Specimen:  Respiratory from Sputum             Significant Imaging:     CXR: Reviewed film--RLL infiltrate. Reviewed radiologist's report--   Impression     1. Developing infiltrate or atelectasis in the right lower lobe with minimal left basilar atelectasis or scar.         Assessment/Plan:     * Severe sepsis    Due to pneumonia  IV rocephin and Azithromycin  Lactic acid 2.7--> continue to trend  Given 500cc NS bolus in Ed--no more IVF due to CHF  Blood cultures pending  Obtain sputum culture  Monitor labs and VS     Community acquired pneumonia of right lower lobe of lung    IV Azithromycin and Rocephin  Obtain Sputum culture  Check Procalcitonin  Nebulizers/O2       Chronic obstructive pulmonary disease with acute exacerbation    Due to pneumonia  O2 therapy  Nebulizers  IV solumedrol  PEFR       Wound, open, abdominal wall, lateral    Chronic  Dressing changed per home health yesterday  Consult wound care       Chronic pain syndrome    Continue home pain regimen       Chronic combined systolic and diastolic CHF (congestive heart failure)    Stable  Continue chronic BB, ACEI, and diuretics  Monitor on telemetry  Monitor for volume overload       Hypercapnic respiratory failure, chronic    Continue home O2 2L       Hyperlipidemia associated with type 2 diabetes mellitus    Continue Statin         Type 2 diabetes mellitus with diabetic neuropathy, without long-term current use of insulin    Controlled. Hold Glucophage due to lactic acidosis  Monitor blood sugars QAC&HS  SSI prn  Diabetic diet  Continue Gabapentin  Lab Results   Component Value Date    HGBA1C 5.6 09/23/2018          PVD (peripheral vascular disease)    Continue statin       Hypertension associated with  diabetes    Chronic/Controlled. Continue chronic medications, adjusting as needed.  Monitor VS       Morbid obesity with BMI of 40.0-44.9, adult    Body mass index is 41.05 kg/m². Morbid obesity complicates all aspects of disease management from diagnostic modalities to treatment. Weight loss encouraged and health benefits explained to patient.         Chronic atrial fibrillation    Continue Eliquis and BB  Monitor on telemetry       Tobacco dependency    Smoking cessation encouraged  Nicotine patch       MDD (major depressive disorder)    Stable. Continue Wellbutrin, Trazodone, and Cymbalta         VTE Risk Mitigation (From admission, onward)        Ordered     apixaban tablet 5 mg  2 times daily      01/20/19 0044     IP VTE HIGH RISK PATIENT  Once      01/19/19 2208     Place DEVEN hose  Until discontinued      01/19/19 4103             Nicole Renee NP  Department of Hospital Medicine   [facility]

## 2019-01-20 NOTE — ASSESSMENT & PLAN NOTE
Body mass index is 48.56 kg/m². Morbid obesity complicates all aspects of disease management from diagnostic modalities to treatment. Weight loss encouraged and health benefits explained to patient.

## 2019-01-20 NOTE — ASSESSMENT & PLAN NOTE
Body mass index is 41.05 kg/m². Morbid obesity complicates all aspects of disease management from diagnostic modalities to treatment. Weight loss encouraged and health benefits explained to patient.

## 2019-01-20 NOTE — SUBJECTIVE & OBJECTIVE
Interval History: no new issues. Still with dyspnea and wheezing requiring oxygen. On 3 liters NC    Review of Systems   Constitutional: Positive for activity change and fatigue. Negative for diaphoresis and fever.   Respiratory: Positive for cough, shortness of breath and wheezing.    Cardiovascular: Negative for chest pain and leg swelling.   Gastrointestinal: Negative for abdominal distention and abdominal pain.   Skin: Positive for color change (BLE chronic changes) and wound (left abdomen). Negative for rash.   Neurological: Positive for numbness. Negative for speech difficulty. Syncope: BLE.     Objective:     Vital Signs (Most Recent):  Temp: 96.1 °F (35.6 °C) (01/20/19 0751)  Pulse: (!) 59 (01/20/19 0751)  Resp: 18 (01/20/19 0751)  BP: (!) 113/53 (01/20/19 0751)  SpO2: (!) 92 % (01/20/19 0751) Vital Signs (24h Range):  Temp:  [96.1 °F (35.6 °C)-102.3 °F (39.1 °C)] 96.1 °F (35.6 °C)  Pulse:  [] 59  Resp:  [18-26] 18  SpO2:  [79 %-96 %] 92 %  BP: (113-142)/(53-78) 113/53     Weight: 132.4 kg (291 lb 12.8 oz)  Body mass index is 48.56 kg/m².    Intake/Output Summary (Last 24 hours) at 1/20/2019 1009  Last data filed at 1/20/2019 0418  Gross per 24 hour   Intake 120 ml   Output 200 ml   Net -80 ml      Physical Exam   Constitutional: She is oriented to person, place, and time. She appears well-developed and well-nourished.   HENT:   Head: Normocephalic and atraumatic.   Mouth/Throat: Oropharynx is clear and moist.   Eyes: Conjunctivae and EOM are normal. Pupils are equal, round, and reactive to light.   Neck: Normal range of motion. Neck supple.   Cardiovascular: Normal rate, regular rhythm and intact distal pulses.   Murmur heard.  Irregular irregular    Pulmonary/Chest: Effort normal. She has wheezes.   Exp wheezes and rhonchi   Abdominal: Soft. Bowel sounds are normal. There is no tenderness.   Grossly protuberant    Musculoskeletal: Normal range of motion.   Neurological: She is alert and oriented to  person, place, and time.   Decreased sensation to BLE   Skin: Skin is warm and dry.   Chronic vascular skin changes BLE, chronic abdominal ulcers to left lower abd-dressing intact. See nursing notes   Psychiatric: She has a normal mood and affect. Her behavior is normal. Judgment normal.   Nursing note and vitals reviewed.      Significant Labs:   CBC:   Recent Labs   Lab 01/19/19  1630 01/20/19  0430   WBC 14.10* 10.10  10.10   HGB 12.1 11.0*  11.0*   HCT 39.6 35.3*  35.3*    209  209     CMP:   Recent Labs   Lab 01/19/19  1630 01/20/19  0430    136  136   K 4.6 4.8  4.8   CL 97 98  98   CO2 34* 31*  31*   GLU 92 158*  158*   BUN 9 12  12   CREATININE 0.7 0.7  0.7   CALCIUM 9.8 9.4  9.4   PROT 7.6 6.7   ALBUMIN 3.3* 2.8*   BILITOT 0.3 0.3   ALKPHOS 86 64   AST 14 16   ALT 14 13   ANIONGAP 11 7*  7*   EGFRNONAA >60 >60  >60       Significant Imaging: I have reviewed and interpreted all pertinent imaging results/findings within the past 24 hours.

## 2019-01-20 NOTE — PROGRESS NOTES
Pt refuses for her left abdominal wound to be touched.  She does not want dressings removed or photos taken or wound care done tonight.  States it can be done tomorrow and that home health had just changed the dressings.

## 2019-01-20 NOTE — PLAN OF CARE
Problem: Diabetes Comorbidity  Goal: Blood Glucose Level Within Desired Range    Intervention: Maintain Glycemic Control   01/20/19 2515   Monitor and Manage Ketoacidosis   Glycemic Management blood glucose monitoring;supplemental insulin given   Diet non compliance- pt eating donuts today.

## 2019-01-20 NOTE — ED NOTES
Patient reports left sided inguinal hernia and skin breakdown.  Multiple clean, dry and intact dressings noted at site.

## 2019-01-20 NOTE — HPI
Ms. Tian is a 69yo F with a PMH of COPD with Asthma, CHF, HTN, DM2, A fib, MI, Morbid Obesity, and Current Smoker. She presents to the ED with c/o Cough. It strated about 2 weeks ago and is occassionally productive. She went to her PCP on 12/28 with c/o cough and congestion and prescribed tessalon perles. She gets SOB with the cough and has been using her night time O2 all day. Associated symptoms include HA, subjective fever, chills, and pleuritic chest pain. While in the ED, she met severe sepsis criteria and her CXR showed a RLL infiltrate. IV Rocephin, Azithromycin, and Solumedrol were initiated. Her Lactate was 2.7 and she was given a 500cc NS bolus. She currently reports breathing much better and wants to eat.

## 2019-01-20 NOTE — ASSESSMENT & PLAN NOTE
Due to pneumonia  IV rocephin and Azithromycin  Lactic acid 2.7--> continue to trend  Given 500cc NS bolus in Ed--no more IVF due to CHF  Blood cultures pending  Obtain sputum culture  Monitor labs and VS

## 2019-01-20 NOTE — PLAN OF CARE
01/20/19 0210   Patient Assessment/Suction   Level of Consciousness (AVPU) alert   Respiratory Effort Normal;Unlabored   Expansion/Accessory Muscles/Retractions expansion symmetric;no retractions;no use of accessory muscles   All Lung Fields Breath Sounds diminished   PRE-TX-O2-ETCO2   O2 Device (Oxygen Therapy) nasal cannula   Flow (L/min) 2   Oxygen Concentration (%) 28   SpO2 (!) 93 %   Pulse Oximetry Type Intermittent   Aerosol Therapy   $ Aerosol Therapy Charges PRN treatment not required   Respiratory Treatment Status (SVN) PRN treatment not required   Ready to Wean/Extubation Screen   FIO2<=50 (chart decimal) 0.28

## 2019-01-20 NOTE — PLAN OF CARE
01/20/19 1009   Patient Assessment/Suction   Level of Consciousness (AVPU) alert   Respiratory Effort Normal;Unlabored   All Lung Fields Breath Sounds diminished   Cough Type good;loose;nonproductive   PRE-TX-O2-ETCO2   O2 Device (Oxygen Therapy) nasal cannula   $ Is the patient on Low Flow Oxygen? Yes   Flow (L/min) 3   Oxygen Concentration (%) 32   SpO2 96 %   Pulse Oximetry Type Intermittent   $ Pulse Oximetry - Multiple Charge Pulse Oximetry - Multiple   Pulse 65   Resp 20   Aerosol Therapy   $ Aerosol Therapy Charges PRN treatment not required;Refused   Respiratory Treatment Status (SVN) PRN treatment not required;refused   Inhaler   $ Inhaler Charges MDI (Metered Dose Inahler) Treatment;Mouth rinsed post treatment   Respiratory Treatment Status (Inhaler) given   Treatment Route (Inhaler) mouthpiece   Patient Position (Inhaler) HOB elevated   Signs of Intolerance (Inhaler) none   Breath Sounds Post-Respiratory Treatment   Throughout All Fields Post-Treatment All Fields   Throughout All Fields Post-Treatment diminished   Post-treatment Heart Rate (beats/min) 65   Post-treatment Resp Rate (breaths/min) 20   Peak Flow   $ Peak Flow Charges Pocket Peak Flow Meter - Supply   PEFR PREtreatment (L/Min) 100   PEFR POST-Treatment (L/Min) 100   Patient Position (PEFR) other (see comments)  (HOB elevated)   Change Pre/Post Treatment (%) 0   Ready to Wean/Extubation Screen   FIO2<=50 (chart decimal) 0.32

## 2019-01-20 NOTE — SUBJECTIVE & OBJECTIVE
Past Medical History:   Diagnosis Date    *Atrial flutter     Angina pectoris 2017    Anxiety     Arthritis     Asthma     Atrial fibrillation     Back pain     Cataract     OD    CHF (congestive heart failure)     COPD (chronic obstructive pulmonary disease)     Depression     Diabetes mellitus     Emphysema of lung     Heart failure     Hepatomegaly 2/3/2016    Hernia     History of MI (myocardial infarction) 2016    Hypercapnic respiratory failure, chronic 2016    Hyperlipidemia     Hypertension     Iron deficiency anemia 2/3/2016    Myocardial infarction     Obesity     Peripheral vascular disease     Pneumonia     Polyneuropathy     Retinal detachment     OS    Septic shock 2017    Skin ulcer 3/18/2017    Tobacco dependence     Type II 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, not stated as uncontrolled(250.60)        Past Surgical History:   Procedure Laterality Date    BREAST BIOPSY Left 10 yrs ago    benign    CATARACT EXTRACTION Left     OS      SECTION      x2    EYE SURGERY      HYSTERECTOMY      partial    OOPHORECTOMY      one ovary conserved    RETINAL DETACHMENT SURGERY      buckle --OS    TONSILLECTOMY         Review of patient's allergies indicates:   Allergen Reactions    Dilaudid [hydromorphone] Anaphylaxis     Other reaction(s): Anaphylaxis  Other reaction(s): Unknown    Zyvox [linezolid in dextrose 5%] Shortness Of Breath       No current facility-administered medications on file prior to encounter.      Current Outpatient Medications on File Prior to Encounter   Medication Sig    albuterol (ACCUNEB) 0.63 mg/3 mL Nebu Take 3 mLs (0.63 mg total) by nebulization every 6 (six) hours as needed. Rescue    albuterol (VENTOLIN HFA) 90 mcg/actuation inhaler Inhale 2 puffs into the lungs every 6 (six) hours as needed. Rescue    apixaban (ELIQUIS) 5 mg Tab Take 1 tablet (5 mg total) by mouth 2 (two)  times daily.    ascorbic acid, vitamin C, (VITAMIN C) 500 MG tablet Take 1 tablet (500 mg total) by mouth every evening. (Patient taking differently: Take 1,000 mg by mouth 2 (two) times daily. )    atorvastatin (LIPITOR) 20 MG tablet Take 1 tablet (20 mg total) by mouth once daily.    BREO ELLIPTA 100-25 mcg/dose diskus inhaler INHALE 1 PUFF INTO THE LUNGS ONCE DAILY.    budesonide (PULMICORT) 0.5 mg/2 mL nebulizer solution Take 2 mLs (0.5 mg total) by nebulization once daily. Controller    clonazePAM (KLONOPIN) 1 MG tablet 1 tab po bid prn    gabapentin (NEURONTIN) 800 MG tablet Take 1 tablet (800 mg total) by mouth 3 (three) times daily.    HYDROcodone-acetaminophen (NORCO)  mg per tablet Take 1 tablet by mouth every 8 (eight) hours as needed for Pain (Do not take more than 3 a day. Do not mix with other narcotics.).    levalbuterol (XOPENEX) 0.63 mg/3 mL nebulizer solution INHALE THE CONTENTS OF 1 VIAL BY NEBULIZATION 4 times  DAILY.    DX: J43.9    lisinopril (PRINIVIL,ZESTRIL) 20 MG tablet Take 1 tablet (20 mg total) by mouth once daily.    metFORMIN (GLUCOPHAGE) 500 MG tablet Take 1 tablet (500 mg total) by mouth 2 (two) times daily with meals.    metoprolol succinate (TOPROL-XL) 25 MG 24 hr tablet Take 0.5 tablets (12.5 mg total) by mouth once daily.    multivitamin (THERAGRAN) tablet Take 1 tablet by mouth once daily.    nystatin (NYSTOP) powder Apply topically 2 (two) times daily.    nystatin-triamcinolone (MYCOLOG II) cream Apply topically 3 (three) times daily.    omega-3 acid ethyl esters (LOVAZA) 1 gram capsule 2 (two) times daily.     omeprazole (PRILOSEC) 20 MG capsule Take 1 capsule (20 mg total) by mouth once daily.    ondansetron (ZOFRAN-ODT) 8 MG TbDL Take 1 tablet (8 mg total) by mouth every 6 (six) hours as needed.    potassium chloride SA (KLOR-CON M20) 20 MEQ tablet Take 1 tablet (20 mEq total) by mouth once daily.    senna-docusate 8.6-50 mg (PERICOLACE) 8.6-50 mg  per tablet Take 1 tablet by mouth 2 (two) times daily as needed for Constipation.    spironolactone (ALDACTONE) 25 MG tablet Take 1 tablet (25 mg total) by mouth once daily.    traZODone (DESYREL) 100 MG tablet Take 1 tablet (100 mg total) by mouth every evening.    zaleplon (SONATA) 5 MG Cap Take 1 capsule (5 mg total) by mouth every evening.    buPROPion (WELLBUTRIN) 100 MG tablet Take 1 tablet (100 mg total) by mouth 2 (two) times daily.    diclofenac sodium 1.5 % Drop     DULoxetine (CYMBALTA) 30 MG capsule     fluticasone (FLONASE) 50 mcg/actuation nasal spray 1 spray (50 mcg total) by Each Nare route once daily.    furosemide (LASIX) 40 MG tablet TAKE 1 TABLET ONE TIME DAILY  FOR  FLUID  OVERLOAD    loratadine (CLARITIN) 10 mg tablet Take 1 tablet (10 mg total) by mouth once daily.    polyethylene glycol (GLYCOLAX) 17 gram/dose powder      Family History     Problem Relation (Age of Onset)    Mother:   Father:      Alcohol abuse Mother    Arthritis Father, Sister    Blindness Son    Cancer Brother    Crohn's disease Sister    Early death Sister (30)    Heart disease Father, Sister (32), Sister    No Known Problems Daughter        Tobacco Use    Smoking status: Current Some Day Smoker     Packs/day: 0.25     Years: 50.00     Pack years: 12.50     Types: Cigarettes     Start date: 1968    Smokeless tobacco: Never Used   Substance and Sexual Activity    Alcohol use: No     Alcohol/week: 0.0 oz     Frequency: Never    Drug use: No    Sexual activity: Not Currently     Review of Systems   Constitutional: Positive for chills and fever. Negative for diaphoresis.   HENT: Positive for rhinorrhea. Negative for congestion.    Eyes: Negative for visual disturbance.   Respiratory: Positive for cough, chest tightness and shortness of breath.    Cardiovascular: Positive for chest pain.        Pleuritic chest pain.   Gastrointestinal: Negative for abdominal pain, diarrhea, nausea and  vomiting.   Genitourinary: Negative for dysuria and hematuria.   Musculoskeletal: Negative for arthralgias.   Skin: Positive for wound.        Chronic abdominal wounds   Neurological: Positive for headaches. Negative for dizziness and syncope.   Hematological: Bruises/bleeds easily.        On eliquis   Psychiatric/Behavioral: Negative for confusion and hallucinations.     Objective:     Vital Signs (Most Recent):  Temp: 97.2 °F (36.2 °C) (01/19/19 2200)  Pulse: 95 (01/19/19 2200)  Resp: (!) 22 (01/19/19 2200)  BP: (!) 125/53 (01/19/19 2200)  SpO2: (!) 91 % (01/19/19 2207) Vital Signs (24h Range):  Temp:  [97.2 °F (36.2 °C)-102.3 °F (39.1 °C)] 97.2 °F (36.2 °C)  Pulse:  [] 95  Resp:  [20-26] 22  SpO2:  [79 %-96 %] 91 %  BP: (119-142)/(53-78) 125/53     Weight: 122.5 kg (270 lb)  Body mass index is 41.05 kg/m².    Physical Exam   Constitutional: She is oriented to person, place, and time. No distress.   Obese   HENT:   Head: Normocephalic.   Eyes: Pupils are equal, round, and reactive to light.   Neck: Normal range of motion. Neck supple. No JVD present. No tracheal deviation present.   Cardiovascular: Regular rhythm, normal heart sounds and intact distal pulses. Tachycardia present.   Pulmonary/Chest: Effort normal. No respiratory distress.   Diminished breath sound throughout   Abdominal: Soft. Bowel sounds are normal. There is no tenderness.   Grade 4-5 pannus   Musculoskeletal: Normal range of motion. She exhibits no edema.   Neurological: She is alert and oriented to person, place, and time. No cranial nerve deficit.   Skin: Skin is warm and dry. Capillary refill takes less than 2 seconds.   Petechia to right lower leg. Left lateral abdominal dressings intact.         CRANIAL NERVES     CN III, IV, VI   Pupils are equal, round, and reactive to light.       Significant Labs:   CBC:   Recent Labs   Lab 01/19/19  1630   WBC 14.10*   HGB 12.1   HCT 39.6        CMP:   Recent Labs   Lab 01/19/19  1630   NA  142   K 4.6   CL 97   CO2 34*   GLU 92   BUN 9   CREATININE 0.7   CALCIUM 9.8   PROT 7.6   ALBUMIN 3.3*   BILITOT 0.3   ALKPHOS 86   AST 14   ALT 14   ANIONGAP 11   EGFRNONAA >60     Cardiac Markers:   Recent Labs   Lab 01/19/19  1826   *     Lactic Acid:   Recent Labs   Lab 01/19/19  1630 01/19/19  2232   LACTATE 2.7* 1.2     POCT Glucose:   Recent Labs   Lab 01/19/19  2217   POCTGLUCOSE 149*     Troponin:   Recent Labs   Lab 01/19/19  2232   TROPONINI 0.012     Influenza A&B: negative    Microbiology Results (last 7 days)     Procedure Component Value Units Date/Time    Blood culture #1 [[id number]] Collected:  01/19/19 1645    Order Status:  Sent Specimen:  Blood Updated:  01/19/19 2355    Blood culture #2 [[id number]] Collected:  01/19/19 1659    Order Status:  Sent Specimen:  Blood Updated:  01/19/19 2355    Culture, Respiratory with Gram Stain [[id number]]     Order Status:  No result Specimen:  Respiratory from Sputum             Significant Imaging:     CXR: Reviewed film--RLL infiltrate. Reviewed radiologist's report--   Impression     1. Developing infiltrate or atelectasis in the right lower lobe with minimal left basilar atelectasis or scar.

## 2019-01-20 NOTE — ASSESSMENT & PLAN NOTE
Smoking cessation encouraged  Nicotine patch  Dangers of cigarette smoking were reviewed with patient in detail for 10 minutes and patient was encouraged to quit. Nicotine replacement options were discussed.

## 2019-01-20 NOTE — PLAN OF CARE
PCP is Dr Bird.  Verified insurance as [employer] mgd medicare and medicaid.  Patient lives with her daughter Maye.  Active with [facility].  Discharge plan is home with .       01/20/19 1032   Discharge Assessment   Assessment Type Discharge Planning Assessment   Confirmed/corrected address and phone number on facesheet? Yes   Assessment information obtained from? Patient   Prior to hospitilization cognitive status: Alert/Oriented   Prior to hospitalization functional status: Assistive Equipment;Independent   Current cognitive status: Alert/Oriented   Current Functional Status: Independent;Assistive Equipment   Lives With child(bonny), adult   Able to Return to Prior Arrangements yes   Is patient able to care for self after discharge? Yes   Patient's perception of discharge disposition home health   Readmission Within the Last 30 Days no previous admission in last 30 days   Patient currently receives any other outside agency services? Yes   Name and contact number of agency or person providing outside services [facility]   Is it the patient/care giver preference to resume care with the current outside agency? Yes   Equipment Currently Used at Home cane, straight;walker, rolling;bedside commode;nebulizer;oxygen  (o2 at 2lnc)   Do you have any problems affording any of your prescribed medications? No   Is the patient taking medications as prescribed? yes   Does the patient have transportation home? Yes   Transportation Anticipated family or friend will provide   Dialysis Name and Scheduled days none   Discharge Plan A Home Health   DME Needed Upon Discharge  none   Patient/Family in Agreement with Plan yes

## 2019-01-20 NOTE — PLAN OF CARE
Problem: Fall Injury Risk  Goal: Absence of Fall and Fall-Related Injury    Intervention: Promote Injury-Free Environment   01/20/19 9761   Optimize [place] and Functional Mobility   Environmental Safety Modification clutter free environment maintained;lighting adjusted   Optimize Balance and Safe Activity   Safety Promotion/Fall Prevention bedside commode chair;side rails raised x 2   Patient refuses non skid socks.

## 2019-01-20 NOTE — ASSESSMENT & PLAN NOTE
Controlled. Hold Glucophage due to lactic acidosis  Monitor blood sugars QAC&HS  SSI prn  Diabetic diet  Continue Gabapentin  Lab Results   Component Value Date    HGBA1C 5.6 09/23/2018

## 2019-01-20 NOTE — ASSESSMENT & PLAN NOTE
Stable  Continue chronic BB, ACEI, and diuretics  Monitor on telemetry  Monitor for volume overload

## 2019-01-20 NOTE — NURSING
"Patient has a wound to her left lower abd but she will not allow me to remove the dressing to assess it.  States "it does not need to be changed today".   Home health usually manages the wound every 3 daus  "

## 2019-01-21 LAB
ALBUMIN SERPL BCP-MCNC: 2.7 G/DL
ALP SERPL-CCNC: 62 U/L
ALT SERPL W/O P-5'-P-CCNC: 12 U/L
ANION GAP SERPL CALC-SCNC: 8 MMOL/L
ANION GAP SERPL CALC-SCNC: 8 MMOL/L
AST SERPL-CCNC: 14 U/L
BASOPHILS # BLD AUTO: 0 K/UL
BASOPHILS # BLD AUTO: 0 K/UL
BASOPHILS NFR BLD: 0.3 %
BASOPHILS NFR BLD: 0.3 %
BILIRUB SERPL-MCNC: 0.2 MG/DL
BUN SERPL-MCNC: 21 MG/DL
BUN SERPL-MCNC: 21 MG/DL
CALCIUM SERPL-MCNC: 9.3 MG/DL
CALCIUM SERPL-MCNC: 9.3 MG/DL
CHLORIDE SERPL-SCNC: 95 MMOL/L
CHLORIDE SERPL-SCNC: 95 MMOL/L
CO2 SERPL-SCNC: 35 MMOL/L
CO2 SERPL-SCNC: 35 MMOL/L
CREAT SERPL-MCNC: 0.9 MG/DL
CREAT SERPL-MCNC: 0.9 MG/DL
DIFFERENTIAL METHOD: ABNORMAL
DIFFERENTIAL METHOD: ABNORMAL
EOSINOPHIL # BLD AUTO: 0 K/UL
EOSINOPHIL # BLD AUTO: 0 K/UL
EOSINOPHIL NFR BLD: 0.1 %
EOSINOPHIL NFR BLD: 0.1 %
ERYTHROCYTE [DISTWIDTH] IN BLOOD BY AUTOMATED COUNT: 17.1 %
ERYTHROCYTE [DISTWIDTH] IN BLOOD BY AUTOMATED COUNT: 17.1 %
EST. GFR  (AFRICAN AMERICAN): >60 ML/MIN/1.73 M^2
EST. GFR  (AFRICAN AMERICAN): >60 ML/MIN/1.73 M^2
EST. GFR  (NON AFRICAN AMERICAN): >60 ML/MIN/1.73 M^2
EST. GFR  (NON AFRICAN AMERICAN): >60 ML/MIN/1.73 M^2
GLUCOSE SERPL-MCNC: 128 MG/DL
GLUCOSE SERPL-MCNC: 128 MG/DL
HCT VFR BLD AUTO: 34.7 %
HCT VFR BLD AUTO: 34.7 %
HGB BLD-MCNC: 10.6 G/DL
HGB BLD-MCNC: 10.6 G/DL
LYMPHOCYTES # BLD AUTO: 1.3 K/UL
LYMPHOCYTES # BLD AUTO: 1.3 K/UL
LYMPHOCYTES NFR BLD: 14.5 %
LYMPHOCYTES NFR BLD: 14.5 %
MAGNESIUM SERPL-MCNC: 2.2 MG/DL
MCH RBC QN AUTO: 25.6 PG
MCH RBC QN AUTO: 25.6 PG
MCHC RBC AUTO-ENTMCNC: 30.5 G/DL
MCHC RBC AUTO-ENTMCNC: 30.5 G/DL
MCV RBC AUTO: 84 FL
MCV RBC AUTO: 84 FL
MONOCYTES # BLD AUTO: 1.5 K/UL
MONOCYTES # BLD AUTO: 1.5 K/UL
MONOCYTES NFR BLD: 16 %
MONOCYTES NFR BLD: 16 %
NEUTROPHILS # BLD AUTO: 6.4 K/UL
NEUTROPHILS # BLD AUTO: 6.4 K/UL
NEUTROPHILS NFR BLD: 69.1 %
NEUTROPHILS NFR BLD: 69.1 %
PHOSPHATE SERPL-MCNC: 3.7 MG/DL
PLATELET # BLD AUTO: 222 K/UL
PLATELET # BLD AUTO: 222 K/UL
PMV BLD AUTO: 8.9 FL
PMV BLD AUTO: 8.9 FL
POCT GLUCOSE: 132 MG/DL (ref 70–110)
POCT GLUCOSE: 141 MG/DL (ref 70–110)
POCT GLUCOSE: 184 MG/DL (ref 70–110)
POCT GLUCOSE: 316 MG/DL (ref 70–110)
POTASSIUM SERPL-SCNC: 4.3 MMOL/L
POTASSIUM SERPL-SCNC: 4.3 MMOL/L
PROT SERPL-MCNC: 6.4 G/DL
RBC # BLD AUTO: 4.13 M/UL
RBC # BLD AUTO: 4.13 M/UL
SODIUM SERPL-SCNC: 138 MMOL/L
SODIUM SERPL-SCNC: 138 MMOL/L
WBC # BLD AUTO: 9.3 K/UL
WBC # BLD AUTO: 9.3 K/UL

## 2019-01-21 PROCEDURE — 63600175 PHARM REV CODE 636 W HCPCS: Performed by: NURSE PRACTITIONER

## 2019-01-21 PROCEDURE — 94640 AIRWAY INHALATION TREATMENT: CPT

## 2019-01-21 PROCEDURE — 84100 ASSAY OF PHOSPHORUS: CPT

## 2019-01-21 PROCEDURE — 12000002 HC ACUTE/MED SURGE SEMI-PRIVATE ROOM

## 2019-01-21 PROCEDURE — S4991 NICOTINE PATCH NONLEGEND: HCPCS | Performed by: NURSE PRACTITIONER

## 2019-01-21 PROCEDURE — 80053 COMPREHEN METABOLIC PANEL: CPT

## 2019-01-21 PROCEDURE — 94761 N-INVAS EAR/PLS OXIMETRY MLT: CPT

## 2019-01-21 PROCEDURE — 25000242 PHARM REV CODE 250 ALT 637 W/ HCPCS: Performed by: NURSE PRACTITIONER

## 2019-01-21 PROCEDURE — 27000221 HC OXYGEN, UP TO 24 HOURS

## 2019-01-21 PROCEDURE — 83735 ASSAY OF MAGNESIUM: CPT

## 2019-01-21 PROCEDURE — 99900035 HC TECH TIME PER 15 MIN (STAT)

## 2019-01-21 PROCEDURE — 85025 COMPLETE CBC W/AUTO DIFF WBC: CPT

## 2019-01-21 PROCEDURE — 97802 MEDICAL NUTRITION INDIV IN: CPT

## 2019-01-21 PROCEDURE — 25000003 PHARM REV CODE 250: Performed by: NURSE PRACTITIONER

## 2019-01-21 PROCEDURE — 36415 COLL VENOUS BLD VENIPUNCTURE: CPT

## 2019-01-21 RX ORDER — GUAIFENESIN 600 MG/1
600 TABLET, EXTENDED RELEASE ORAL 2 TIMES DAILY
Status: DISCONTINUED | OUTPATIENT
Start: 2019-01-21 | End: 2019-01-22 | Stop reason: HOSPADM

## 2019-01-21 RX ORDER — ADHESIVE BANDAGE
30 BANDAGE TOPICAL DAILY PRN
Status: DISCONTINUED | OUTPATIENT
Start: 2019-01-21 | End: 2019-01-22 | Stop reason: HOSPADM

## 2019-01-21 RX ORDER — SYRING-NEEDL,DISP,INSUL,0.3 ML 29 G X1/2"
296 SYRINGE, EMPTY DISPOSABLE MISCELLANEOUS ONCE
Status: COMPLETED | OUTPATIENT
Start: 2019-01-21 | End: 2019-01-21

## 2019-01-21 RX ADMIN — CEFTRIAXONE 2 G: 2 INJECTION, SOLUTION INTRAVENOUS at 04:01

## 2019-01-21 RX ADMIN — PREDNISONE 40 MG: 20 TABLET ORAL at 08:01

## 2019-01-21 RX ADMIN — AZITHROMYCIN MONOHYDRATE 250 MG: 500 INJECTION, POWDER, LYOPHILIZED, FOR SOLUTION INTRAVENOUS at 04:01

## 2019-01-21 RX ADMIN — OXYCODONE HYDROCHLORIDE AND ACETAMINOPHEN 1000 MG: 500 TABLET ORAL at 08:01

## 2019-01-21 RX ADMIN — APIXABAN 5 MG: 2.5 TABLET, FILM COATED ORAL at 08:01

## 2019-01-21 RX ADMIN — IPRATROPIUM BROMIDE AND ALBUTEROL SULFATE 3 ML: .5; 3 SOLUTION RESPIRATORY (INHALATION) at 11:01

## 2019-01-21 RX ADMIN — POTASSIUM CHLORIDE 20 MEQ: 20 TABLET, EXTENDED RELEASE ORAL at 08:01

## 2019-01-21 RX ADMIN — STANDARDIZED SENNA CONCENTRATE AND DOCUSATE SODIUM 1 TABLET: 8.6; 5 TABLET, FILM COATED ORAL at 08:01

## 2019-01-21 RX ADMIN — FUROSEMIDE 40 MG: 40 TABLET ORAL at 08:01

## 2019-01-21 RX ADMIN — CLONAZEPAM 1 MG: 1 TABLET ORAL at 02:01

## 2019-01-21 RX ADMIN — INSULIN ASPART 4 UNITS: 100 INJECTION, SOLUTION INTRAVENOUS; SUBCUTANEOUS at 05:01

## 2019-01-21 RX ADMIN — GABAPENTIN 800 MG: 400 CAPSULE ORAL at 02:01

## 2019-01-21 RX ADMIN — LISINOPRIL 20 MG: 10 TABLET ORAL at 08:01

## 2019-01-21 RX ADMIN — ATORVASTATIN CALCIUM 20 MG: 20 TABLET, FILM COATED ORAL at 08:01

## 2019-01-21 RX ADMIN — HYDROCODONE BITARTRATE AND ACETAMINOPHEN 1 TABLET: 10; 325 TABLET ORAL at 08:01

## 2019-01-21 RX ADMIN — GABAPENTIN 800 MG: 400 CAPSULE ORAL at 08:01

## 2019-01-21 RX ADMIN — GUAIFENESIN 600 MG: 600 TABLET, EXTENDED RELEASE ORAL at 08:01

## 2019-01-21 RX ADMIN — MAGNESIUM HYDROXIDE 2400 MG: 400 SUSPENSION ORAL at 08:01

## 2019-01-21 RX ADMIN — PANTOPRAZOLE SODIUM 40 MG: 40 TABLET, DELAYED RELEASE ORAL at 08:01

## 2019-01-21 RX ADMIN — FLUTICASONE FUROATE AND VILANTEROL TRIFENATATE 1 PUFF: 100; 25 POWDER RESPIRATORY (INHALATION) at 09:01

## 2019-01-21 RX ADMIN — IPRATROPIUM BROMIDE AND ALBUTEROL SULFATE 3 ML: .5; 3 SOLUTION RESPIRATORY (INHALATION) at 04:01

## 2019-01-21 RX ADMIN — METOPROLOL SUCCINATE 12.5 MG: 25 TABLET, EXTENDED RELEASE ORAL at 08:01

## 2019-01-21 RX ADMIN — Medication 296 ML: at 05:01

## 2019-01-21 RX ADMIN — SPIRONOLACTONE 25 MG: 25 TABLET ORAL at 08:01

## 2019-01-21 RX ADMIN — TRAZODONE HYDROCHLORIDE 100 MG: 50 TABLET ORAL at 08:01

## 2019-01-21 RX ADMIN — NICOTINE 1 PATCH: 14 PATCH, EXTENDED RELEASE TRANSDERMAL at 08:01

## 2019-01-21 RX ADMIN — THERA TABS 1 TABLET: TAB at 08:01

## 2019-01-21 RX ADMIN — CLONAZEPAM 1 MG: 1 TABLET ORAL at 10:01

## 2019-01-21 RX ADMIN — IPRATROPIUM BROMIDE AND ALBUTEROL SULFATE 3 ML: .5; 3 SOLUTION RESPIRATORY (INHALATION) at 06:01

## 2019-01-21 RX ADMIN — IPRATROPIUM BROMIDE AND ALBUTEROL SULFATE 3 ML: .5; 3 SOLUTION RESPIRATORY (INHALATION) at 07:01

## 2019-01-21 NOTE — PLAN OF CARE
01/20/19 1950   Patient Assessment/Suction   Level of Consciousness (AVPU) alert   Respiratory Effort Normal;Unlabored   Expansion/Accessory Muscles/Retractions expansion symmetric;no retractions;no use of accessory muscles   All Lung Fields Breath Sounds diminished   Cough Type nonproductive   PRE-TX-O2-ETCO2   O2 Device (Oxygen Therapy) nasal cannula   Flow (L/min) 2   Oxygen Concentration (%) 28   SpO2 (!) 88 %  (Room air)   Pulse Oximetry Type Intermittent   Pulse 60   Resp 20   Aerosol Therapy   $ Aerosol Therapy Charges Aerosol Treatment   Respiratory Treatment Status (SVN) given   Treatment Route (SVN) air;mask   Patient Position (SVN) sitting on edge of bed   Signs of Intolerance (SVN) none   Breath Sounds Post-Respiratory Treatment   Throughout All Fields Post-Treatment All Fields   Throughout All Fields Post-Treatment diminished   Post-treatment Heart Rate (beats/min) 58   Post-treatment Resp Rate (breaths/min) 20   Peak Flow   $ Peak Flow Charges Given   PEFR PREtreatment (L/Min) 160   PEFR POST-Treatment (L/Min) 160   Patient Position (PEFR) sitting on edge of bed   Change Pre/Post Treatment (%) 0   Ready to Wean/Extubation Screen   FIO2<=50 (chart decimal) 0.28

## 2019-01-21 NOTE — PROGRESS NOTES
[facility]  Adult Nutrition  Progress Note    SUMMARY   Intervention: Protein/calorie/carbohydrate/fat/sodium modified diet, Nutrition Supplement-commerical beverage, and Nutrition education- DM 2 diet/wt loss    Recommendation:    1) Rec. continue diet as ordered   2) Add Ezra BID   3) Diabetic and wt loss diet/importance of protein education and materials    Goals: 1) PO intakes > 75% of meals and supplements at f/u 2) Compliance to diet order at f/u  Nutrition Goal Status: new  Communication of RD Recs: (POC, sticky note, second sign )    Reason for Assessment    Reason For Assessment: consult  Diagnosis: (Severe Sepsis)  Relevant Medical History: COPD, CHF, morbid obesity, current smoker, asthma, HTN, DM2, AFIB, MI  Interdisciplinary Rounds: attended    General Information Comments: 67 y/o female admitted with sepsis, pneumonia, and PI stage 2/abd. wound. PTA pt states she was eating 2 meals daily for the last 2 weeks r/t trying to lose weight (wt fluctuates 138-122 kg usually). She states her sugars never get above 130 mg/dl at home, howevere pt noted to be non-compliant eating donut in nursing note but pt denies this. Educated pt on carb counting, the importance of following a diabetic diet and eating protein/fiber with measl as well as tips for safe wt loss/meal plan. Materials provided. NFPE done, no wasting noted, pt morbidly obese.     Nutrition Discharge Planning: To be determined- DM 1800 kcal diet for wt loss, cardiac diet + Ezra BID    Nutrition Risk Screen    Nutrition Risk Screen: large or nonhealing wound, burn or pressure injury    Nutrition/Diet History    Patient Reported Diet/Restrictions/Preferences: diabetic diet  Spiritual, Cultural Beliefs, Baptist Practices, Values that Affect Care: no  Food Allergies: NKFA(or intolerances)  Factors Affecting Nutritional Intake: None identified at this time    Anthropometrics    Temp: 98 °F (36.7 °C)  Height Method:  "Measured(18)  Height: 5' 8"  Height (inches): 68 in  Weight Method: Bed Scale  Weight: 132.4 kg (291 lb 14.2 oz)  Weight (lb): 291.89 lb  Ideal Body Weight (IBW), Female: 140 lb  % Ideal Body Weight, Female (lb): 208.49 lb  BMI (Calculated): 44.5  BMI Grade: greater than 40 - morbid obesity  Usual Body Weight (UBW), k kg  % Usual Body Weight: 108.75  % Weight Change From Usual Weight: 8.52 %       Lab/Procedures/Meds    Pertinent Labs Reviewed: reviewed  BMP  Lab Results   Component Value Date     2019     2019    K 4.3 2019    K 4.3 2019    CL 95 2019    CL 95 2019    CO2 35 (H) 2019    CO2 35 (H) 2019    BUN 21 2019    BUN 21 2019    CREATININE 0.9 2019    CREATININE 0.9 2019    CALCIUM 9.3 2019    CALCIUM 9.3 2019    ANIONGAP 8 2019    ANIONGAP 8 2019    ESTGFRAFRICA >60 2019    ESTGFRAFRICA >60 2019    EGFRNONAA >60 2019    EGFRNONAA >60 2019     POCT Glucose   Date Value Ref Range Status   2019 141 (H) 70 - 110 mg/dL Final   2019 132 (H) 70 - 110 mg/dL Final   2019 242 (H) 70 - 110 mg/dL Final   2019 251 (H) 70 - 110 mg/dL Final   2019 186 (H) 70 - 110 mg/dL Final   2019 149 (H) 70 - 110 mg/dL Final     Lab Results   Component Value Date    HGBA1C 5.6 2019       Pertinent Medications Reviewed: reviewed  Pertinent Medications Comments: Vitamin C, MVI, insulin, lasix, zofran, KCl, spironolactone    Estimated/Assessed Needs    Weight Used For Calorie Calculations: 132.4 kg (291 lb 14.2 oz)  Energy Calorie Requirements (kcal): [facility] jeor ( no activity factor obesity) = 1905 kcal  Energy Need Method: [facility]  Protein Requirements: 1.0-1.1 g protein/kg ( obesity vs. PI stage 2) = 132-145 g protein  Weight Used For Protein Calculations: 132.4 kg (291 lb 14.2 oz)  Fluid Requirements (mL): 1900 ml  Estimated Fluid " Requirement Method: RDA Method  CHO Requirement: 45-50%      Nutrition Prescription Ordered    Current Diet Order: DM 2000, cardiac diet     Evaluation of Received Nutrient/Fluid Intake    Energy Calories Required: meeting needs  Protein Required: not meeting needs  Fluid Required: meeting needs  Tolerance: tolerating  % Intake of Estimated Energy Needs: 100%  % Meal Intake: %    Nutrition Risk    Level of Risk/Frequency of Follow-up: low - moderate 1 x weekly     Assessment and Plan    Type 2 diabetes mellitus with diabetic neuropathy, without long-term current use of insulin    Contributing Nutrition Diagnosis  Altered Nutrition Related Lab Values    Related to (etiology):   Excessive Carbohydrate Intake and Altered Absorption of Nutrients    Signs and Symptoms (as evidenced by):   Elevated Glucose    Interventions/Recommendations (treatment strategy):  1) DM 2 Diet education/materials provided 2) DM 2000 kcal diet    Nutrition Diagnosis Status:   New       Morbid obesity with BMI of 40.0-44.9, adult    Contributing Nutrition Diagnosis  Obesity Stage 3    Related to (etiology):   Limited Compliance with Nutrition Recommendations and Excessive energy intake    Signs and Symptoms (as evidenced by):   BMI > 40 kg/m2    Interventions/Recommendations (treatment strategy):  1) Weight loss diet education/meal plan given    Nutrition Diagnosis Status:   New            Monitor and Evaluation    Food and Nutrient Intake: energy intake, food and beverage intake  Food and Nutrient Adminstration: diet order  Anthropometric Measurements: weight  Biochemical Data, Medical Tests and Procedures: electrolyte and renal panel, glucose/endocrine profile  Nutrition-Focused Physical Findings: overall appearance, skin     Malnutrition Assessment     Skin (Micronutrient): (Jung= 19 +. L. abd. wound/ PI stage 2)  Teeth (Micronutrient): (WDL)   Micronutrient Evaluation: suspected deficiency  Micronutrient Evaluation Comments:  Inadequate Protein intake       Subcutaneous Fat Loss (Final Summary): well nourished  Muscle Loss Evaluation (Final Summary): well nourished         Nutrition Follow-Up    RD Follow-up?: Yes

## 2019-01-21 NOTE — PLAN OF CARE
Problem: Adult Inpatient Plan of Care  Goal: Plan of Care Review  Outcome: Ongoing (interventions implemented as appropriate)  Pt rested well during the night. Up to the bedside commode. Blood glucose monitor, PRN insulin given. Diabetic diet. Tele monitor showing afib, HR moody. Wound care consult. Monitoring labs. Respiratory treatments Prn. Will continue to monitor.

## 2019-01-21 NOTE — PLAN OF CARE
Problem: Adult Inpatient Plan of Care  Goal: Plan of Care Review  Outcome: Ongoing (interventions implemented as appropriate)  Fall and safety precautions maintained.  Patient alert and oriented, resting in bed.  Denies pain or SOB. VSS, Pt in NAD.  Plan of care reviewed with patient. Verbalizes understanding.  Call light in reach, bed in low position and wheels locked. Will continue to monitor.

## 2019-01-21 NOTE — ASSESSMENT & PLAN NOTE
Contributing Nutrition Diagnosis  Altered Nutrition Related Lab Values    Related to (etiology):   Excessive Carbohydrate Intake and Altered Absorption of Nutrients    Signs and Symptoms (as evidenced by):   Elevated Glucose    Interventions/Recommendations (treatment strategy):  1) DM 2 Diet education/materials provided 2) DM 2000 kcal diet    Nutrition Diagnosis Status:   New

## 2019-01-21 NOTE — ASSESSMENT & PLAN NOTE
Contributing Nutrition Diagnosis  Obesity Stage 3    Related to (etiology):   Limited Compliance with Nutrition Recommendations and Excessive energy intake    Signs and Symptoms (as evidenced by):   BMI > 40 kg/m2    Interventions/Recommendations (treatment strategy):  1) Weight loss diet education/meal plan given    Nutrition Diagnosis Status:   New

## 2019-01-21 NOTE — PLAN OF CARE
Problem: Diabetes Comorbidity  Goal: Blood Glucose Level Within Desired Range    Intervention: Maintain Glycemic Control  Intervention: Protein/calorie/carbohydrate/fat/sodium modified diet, Nutrition Supplement-commerical beverage, and Nutrition education- DM 2 diet/wt loss     Recommendation:    1) Rec. continue diet as ordered   2) Add Ezra BID   3) Diabetic and wt loss diet/importance of protein education and materials     Goals: 1) PO intakes > 75% of meals and supplements at f/u 2) Compliance to diet order at f/u  Nutrition Goal Status: new  Communication of RD Recs: (POC, sticky note, second sign )

## 2019-01-22 ENCOUNTER — OUTPATIENT CASE MANAGEMENT (OUTPATIENT)
Dept: ADMINISTRATIVE | Facility: OTHER | Age: 69
End: 2019-01-22

## 2019-01-22 VITALS
RESPIRATION RATE: 18 BRPM | BODY MASS INDEX: 44.23 KG/M2 | WEIGHT: 291.88 LBS | HEIGHT: 68 IN | SYSTOLIC BLOOD PRESSURE: 133 MMHG | HEART RATE: 88 BPM | DIASTOLIC BLOOD PRESSURE: 59 MMHG | TEMPERATURE: 98 F | OXYGEN SATURATION: 91 %

## 2019-01-22 LAB
ALBUMIN SERPL BCP-MCNC: 3.1 G/DL
ALP SERPL-CCNC: 59 U/L
ALT SERPL W/O P-5'-P-CCNC: 14 U/L
ANION GAP SERPL CALC-SCNC: 10 MMOL/L
ANION GAP SERPL CALC-SCNC: 10 MMOL/L
AST SERPL-CCNC: 16 U/L
BACTERIA SPEC AEROBE CULT: NORMAL
BACTERIA SPEC AEROBE CULT: NORMAL
BASOPHILS # BLD AUTO: 0 K/UL
BASOPHILS NFR BLD: 0.1 %
BILIRUB SERPL-MCNC: 0.3 MG/DL
BUN SERPL-MCNC: 16 MG/DL
BUN SERPL-MCNC: 16 MG/DL
CALCIUM SERPL-MCNC: 9.5 MG/DL
CALCIUM SERPL-MCNC: 9.5 MG/DL
CHLORIDE SERPL-SCNC: 99 MMOL/L
CHLORIDE SERPL-SCNC: 99 MMOL/L
CO2 SERPL-SCNC: 34 MMOL/L
CO2 SERPL-SCNC: 34 MMOL/L
CREAT SERPL-MCNC: 0.7 MG/DL
CREAT SERPL-MCNC: 0.7 MG/DL
DIFFERENTIAL METHOD: ABNORMAL
EOSINOPHIL # BLD AUTO: 0.1 K/UL
EOSINOPHIL NFR BLD: 0.6 %
ERYTHROCYTE [DISTWIDTH] IN BLOOD BY AUTOMATED COUNT: 16.3 %
EST. GFR  (AFRICAN AMERICAN): >60 ML/MIN/1.73 M^2
EST. GFR  (AFRICAN AMERICAN): >60 ML/MIN/1.73 M^2
EST. GFR  (NON AFRICAN AMERICAN): >60 ML/MIN/1.73 M^2
EST. GFR  (NON AFRICAN AMERICAN): >60 ML/MIN/1.73 M^2
GLUCOSE SERPL-MCNC: 86 MG/DL
GLUCOSE SERPL-MCNC: 86 MG/DL
GRAM STN SPEC: NORMAL
HCT VFR BLD AUTO: 35.6 %
HGB BLD-MCNC: 11.3 G/DL
LYMPHOCYTES # BLD AUTO: 1.7 K/UL
LYMPHOCYTES NFR BLD: 16.5 %
MAGNESIUM SERPL-MCNC: 2.6 MG/DL
MCH RBC QN AUTO: 26.8 PG
MCHC RBC AUTO-ENTMCNC: 31.8 G/DL
MCV RBC AUTO: 84 FL
MONOCYTES # BLD AUTO: 0.9 K/UL
MONOCYTES NFR BLD: 8.6 %
NEUTROPHILS # BLD AUTO: 7.6 K/UL
NEUTROPHILS NFR BLD: 74.2 %
PHOSPHATE SERPL-MCNC: 3.3 MG/DL
PLATELET # BLD AUTO: 216 K/UL
PMV BLD AUTO: 9 FL
POCT GLUCOSE: 151 MG/DL (ref 70–110)
POCT GLUCOSE: 99 MG/DL (ref 70–110)
POTASSIUM SERPL-SCNC: 4.3 MMOL/L
POTASSIUM SERPL-SCNC: 4.3 MMOL/L
PROT SERPL-MCNC: 6.8 G/DL
RBC # BLD AUTO: 4.23 M/UL
SODIUM SERPL-SCNC: 143 MMOL/L
SODIUM SERPL-SCNC: 143 MMOL/L
WBC # BLD AUTO: 10.3 K/UL

## 2019-01-22 PROCEDURE — 83735 ASSAY OF MAGNESIUM: CPT

## 2019-01-22 PROCEDURE — 25000003 PHARM REV CODE 250: Performed by: NURSE PRACTITIONER

## 2019-01-22 PROCEDURE — 63600175 PHARM REV CODE 636 W HCPCS: Performed by: NURSE PRACTITIONER

## 2019-01-22 PROCEDURE — 94761 N-INVAS EAR/PLS OXIMETRY MLT: CPT

## 2019-01-22 PROCEDURE — 94640 AIRWAY INHALATION TREATMENT: CPT

## 2019-01-22 PROCEDURE — 36415 COLL VENOUS BLD VENIPUNCTURE: CPT

## 2019-01-22 PROCEDURE — S4991 NICOTINE PATCH NONLEGEND: HCPCS | Performed by: NURSE PRACTITIONER

## 2019-01-22 PROCEDURE — 85025 COMPLETE CBC W/AUTO DIFF WBC: CPT

## 2019-01-22 PROCEDURE — 25000242 PHARM REV CODE 250 ALT 637 W/ HCPCS: Performed by: NURSE PRACTITIONER

## 2019-01-22 PROCEDURE — 84100 ASSAY OF PHOSPHORUS: CPT

## 2019-01-22 PROCEDURE — 80053 COMPREHEN METABOLIC PANEL: CPT

## 2019-01-22 PROCEDURE — 27000221 HC OXYGEN, UP TO 24 HOURS

## 2019-01-22 RX ORDER — PREDNISONE 20 MG/1
20 TABLET ORAL DAILY
Qty: 5 TABLET | Refills: 0 | Status: SHIPPED | OUTPATIENT
Start: 2019-01-22 | End: 2019-01-27

## 2019-01-22 RX ORDER — AZITHROMYCIN 500 MG/1
500 TABLET, FILM COATED ORAL DAILY
Qty: 2 TABLET | Refills: 0 | Status: SHIPPED | OUTPATIENT
Start: 2019-01-22 | End: 2019-01-24

## 2019-01-22 RX ORDER — GUAIFENESIN 600 MG/1
600 TABLET, EXTENDED RELEASE ORAL 2 TIMES DAILY
COMMUNITY
Start: 2019-01-22 | End: 2019-04-25

## 2019-01-22 RX ADMIN — IPRATROPIUM BROMIDE AND ALBUTEROL SULFATE 3 ML: .5; 3 SOLUTION RESPIRATORY (INHALATION) at 07:01

## 2019-01-22 RX ADMIN — IPRATROPIUM BROMIDE AND ALBUTEROL SULFATE 3 ML: .5; 3 SOLUTION RESPIRATORY (INHALATION) at 03:01

## 2019-01-22 RX ADMIN — METOPROLOL SUCCINATE 12.5 MG: 25 TABLET, EXTENDED RELEASE ORAL at 08:01

## 2019-01-22 RX ADMIN — FLUTICASONE PROPIONATE 50 MCG: 50 SPRAY, METERED NASAL at 08:01

## 2019-01-22 RX ADMIN — ATORVASTATIN CALCIUM 20 MG: 20 TABLET, FILM COATED ORAL at 08:01

## 2019-01-22 RX ADMIN — FUROSEMIDE 40 MG: 40 TABLET ORAL at 08:01

## 2019-01-22 RX ADMIN — CLONAZEPAM 1 MG: 1 TABLET ORAL at 12:01

## 2019-01-22 RX ADMIN — PREDNISONE 40 MG: 20 TABLET ORAL at 08:01

## 2019-01-22 RX ADMIN — GABAPENTIN 800 MG: 400 CAPSULE ORAL at 08:01

## 2019-01-22 RX ADMIN — ACETAMINOPHEN 650 MG: 325 TABLET, FILM COATED ORAL at 08:01

## 2019-01-22 RX ADMIN — NICOTINE 1 PATCH: 14 PATCH, EXTENDED RELEASE TRANSDERMAL at 08:01

## 2019-01-22 RX ADMIN — LISINOPRIL 20 MG: 10 TABLET ORAL at 08:01

## 2019-01-22 RX ADMIN — APIXABAN 5 MG: 2.5 TABLET, FILM COATED ORAL at 08:01

## 2019-01-22 RX ADMIN — SPIRONOLACTONE 25 MG: 25 TABLET ORAL at 08:01

## 2019-01-22 RX ADMIN — IPRATROPIUM BROMIDE AND ALBUTEROL SULFATE 3 ML: .5; 3 SOLUTION RESPIRATORY (INHALATION) at 11:01

## 2019-01-22 RX ADMIN — HYDROCODONE BITARTRATE AND ACETAMINOPHEN 1 TABLET: 10; 325 TABLET ORAL at 11:01

## 2019-01-22 RX ADMIN — FLUTICASONE FUROATE AND VILANTEROL TRIFENATATE 1 PUFF: 100; 25 POWDER RESPIRATORY (INHALATION) at 07:01

## 2019-01-22 RX ADMIN — PANTOPRAZOLE SODIUM 40 MG: 40 TABLET, DELAYED RELEASE ORAL at 08:01

## 2019-01-22 RX ADMIN — GUAIFENESIN 600 MG: 600 TABLET, EXTENDED RELEASE ORAL at 08:01

## 2019-01-22 RX ADMIN — POTASSIUM CHLORIDE 20 MEQ: 20 TABLET, EXTENDED RELEASE ORAL at 08:01

## 2019-01-22 RX ADMIN — THERA TABS 1 TABLET: TAB at 08:01

## 2019-01-22 RX ADMIN — OXYCODONE HYDROCHLORIDE AND ACETAMINOPHEN 1000 MG: 500 TABLET ORAL at 08:01

## 2019-01-22 NOTE — PROGRESS NOTES
01/21/19 1904   Patient Assessment/Suction   Level of Consciousness (AVPU) alert   Respiratory Effort Normal;Unlabored   All Lung Fields Breath Sounds diminished   Rhythm/Pattern, Respiratory pattern regular;depth regular   Cough Frequency infrequent   PRE-TX-O2-ETCO2   O2 Device (Oxygen Therapy) nasal cannula   Flow (L/min) 2   Oxygen Concentration (%) 28   SpO2 100 %   Pulse Oximetry Type Intermittent   Pulse (!) 57   Resp 18   Aerosol Therapy   $ Aerosol Therapy Charges Aerosol Treatment   Respiratory Treatment Status (SVN) given   Treatment Route (SVN) mask;air   Patient Position (SVN) HOB elevated   Breath Sounds Post-Respiratory Treatment   Throughout All Fields Post-Treatment no change   Post-treatment Heart Rate (beats/min) 58   Post-treatment Resp Rate (breaths/min) 20   Peak Flow   $ Peak Flow Charges Given   PEFR PREtreatment (L/Min) 170   PEFR POST-Treatment (L/Min) 180   Patient Position (PEFR) other (see comments)  (sitting in bed)   Change Pre/Post Treatment (%) 5.88   Ready to Wean/Extubation Screen   FIO2<=50 (chart decimal) 0.28

## 2019-01-22 NOTE — PLAN OF CARE
Problem: Adult Inpatient Plan of Care  Goal: Plan of Care Review  Outcome: Ongoing (interventions implemented as appropriate)  Pt rested well during the night. Up to the bedside commode. Blood glucose monitor, PRN insulin given. Diabetic diet. Tele monitor showing afib, HR moody. Monitoring labs. Respiratory treatments PRN q4hrs. Will continue to monitor.

## 2019-01-22 NOTE — PLAN OF CARE
Referral placed to outpatient case management.  Scheduled hospital f/u with PCP office; placed on AVS.       01/22/19 1150   Final Note   Assessment Type Final Discharge Note   Anticipated Discharge Disposition Home-Health   Hospital Follow Up  Appt(s) scheduled? Yes

## 2019-01-22 NOTE — PLAN OF CARE
Patient states she does not need new wound consult (I have seen her during previous hospitalizations).  To be discharged now. She will contact me if necessary

## 2019-01-22 NOTE — PROGRESS NOTES
[facility] Hospital Medicine  Progress Note    Patient Name: Nelly Tian  MRN: [id number]  Patient Class: IP- Inpatient   Admission Date: 1/19/2019  Length of Stay: 2 days  Attending Physician: Micki Davies MD  Primary Care Provider: Constantine Bird MD        Subjective:     Principal Problem:Severe sepsis    HPI:  Ms. Tian is a 67yo F with a PMH of COPD with Asthma, CHF, HTN, DM2, A fib, MI, Morbid Obesity, and Current Smoker. She presents to the ED with c/o Cough. It strated about 2 weeks ago and is occassionally productive. She went to her PCP on 12/28 with c/o cough and congestion and prescribed tessalon perles. She gets SOB with the cough and has been using her night time O2 all day. Associated symptoms include HA, subjective fever, chills, and pleuritic chest pain. While in the ED, she met severe sepsis criteria and her CXR showed a RLL infiltrate. IV Rocephin, Azithromycin, and Solumedrol were initiated. Her Lactate was 2.7 and she was given a 500cc NS bolus. She currently reports breathing much better and wants to eat.        Hospital Course:  No notes on file    Interval History: no new issues. Still with dyspnea and wheezing, slowly improving. On home oxygen 2 liters    Review of Systems   Constitutional: Positive for activity change and fatigue. Negative for diaphoresis and fever.   Respiratory: Positive for cough, shortness of breath and wheezing.    Cardiovascular: Negative for chest pain and leg swelling.   Gastrointestinal: Negative for abdominal distention and abdominal pain.   Skin: Positive for color change (BLE chronic changes) and wound (left abdomen). Negative for rash.   Neurological: Positive for numbness. Negative for speech difficulty. Syncope: BLE.     Objective:     Vital Signs (Most Recent):  Temp: 97.9 °F (36.6 °C) (01/21/19 1939)  Pulse: 65 (01/21/19 1939)  Resp: 18 (01/21/19 1939)  BP: (!) 103/54 (01/21/19 1939)  SpO2: 100 % (01/21/19 1904) Vital Signs (24h  Range):  Temp:  [96.5 °F (35.8 °C)-98 °F (36.7 °C)] 97.9 °F (36.6 °C)  Pulse:  [54-75] 65  Resp:  [17-20] 18  SpO2:  [92 %-100 %] 100 %  BP: ()/(52-56) 103/54     Weight: 132.4 kg (291 lb 14.2 oz)  Body mass index is 44.38 kg/m².    Intake/Output Summary (Last 24 hours) at 1/21/2019 2153  Last data filed at 1/21/2019 1800  Gross per 24 hour   Intake 540 ml   Output 2475 ml   Net -1935 ml      Physical Exam   Constitutional: She is oriented to person, place, and time. She appears well-developed and well-nourished.   HENT:   Head: Normocephalic and atraumatic.   Mouth/Throat: Oropharynx is clear and moist.   Eyes: Conjunctivae and EOM are normal. Pupils are equal, round, and reactive to light.   Neck: Normal range of motion. Neck supple.   Cardiovascular: Normal rate, regular rhythm and intact distal pulses.   Murmur heard.  Irregular irregular    Pulmonary/Chest: Effort normal. She has wheezes.   Exp wheezes and rhonchi   Abdominal: Soft. Bowel sounds are normal. There is no tenderness.   Grossly protuberant    Musculoskeletal: Normal range of motion.   Neurological: She is alert and oriented to person, place, and time.   Decreased sensation to BLE   Skin: Skin is warm and dry.   Chronic vascular skin changes BLE, chronic abdominal ulcers to left lower abd-dressing intact. See nursing notes   Psychiatric: She has a normal mood and affect. Her behavior is normal. Judgment normal.   Nursing note and vitals reviewed.      Significant Labs:   CBC:   Recent Labs   Lab 01/20/19  0430 01/21/19  0437   WBC 10.10  10.10 9.30  9.30   HGB 11.0*  11.0* 10.6*  10.6*   HCT 35.3*  35.3* 34.7*  34.7*     209 222  222     CMP:   Recent Labs   Lab 01/20/19  0430 01/21/19  0437     136 138  138   K 4.8  4.8 4.3  4.3   CL 98  98 95  95   CO2 31*  31* 35*  35*   *  158* 128*  128*   BUN 12  12 21  21   CREATININE 0.7  0.7 0.9  0.9   CALCIUM 9.4  9.4 9.3  9.3   PROT 6.7 6.4   ALBUMIN 2.8*  2.7*   BILITOT 0.3 0.2   ALKPHOS 64 62   AST 16 14   ALT 13 12   ANIONGAP 7*  7* 8  8   EGFRNONAA >60  >60 >60  >60       Significant Imaging: I have reviewed and interpreted all pertinent imaging results/findings within the past 24 hours.    Assessment/Plan:      * Severe sepsis    Due to pneumonia  IV rocephin and Azithromycin  Lactic acid 2.7--> continue to trend  Given 500cc NS bolus in Ed--no more IVF due to CHF  Blood cultures pending  Obtain sputum culture  Monitor labs and VS     Wound, open, abdominal wall, lateral    Chronic  Dressing changed per home health yesterday  Consult wound care       Chronic pain syndrome    Continue home pain regimen       Community acquired pneumonia of right lower lobe of lung    IV Azithromycin and Rocephin  Obtain Sputum culture  Check Procalcitonin  Nebulizers/O2  Continue treatment.        Chronic combined systolic and diastolic CHF (congestive heart failure)    Stable  Continue chronic BB, ACEI, and diuretics  Monitor on telemetry  Monitor for volume overload       Hypercapnic respiratory failure, chronic    Continue home O2 2L       Hyperlipidemia associated with type 2 diabetes mellitus    Continue Statin         Type 2 diabetes mellitus with diabetic neuropathy, without long-term current use of insulin    Controlled. Hold Glucophage due to lactic acidosis  Monitor blood sugars QAC&HS  SSI prn  Diabetic diet  Continue Gabapentin  Lab Results   Component Value Date    HGBA1C 5.6 09/23/2018          PVD (peripheral vascular disease)    Continue statin       Hypertension associated with diabetes    Chronic/Controlled. Continue chronic medications, adjusting as needed.  Monitor VS       Morbid obesity with BMI of 40.0-44.9, adult    Body mass index is 48.56 kg/m². Morbid obesity complicates all aspects of disease management from diagnostic modalities to treatment. Weight loss encouraged and health benefits explained to patient.         Chronic atrial fibrillation     Continue Eliquis and BB  Monitor on telemetry       Tobacco dependency    Smoking cessation encouraged  Nicotine patch  Dangers of cigarette smoking were reviewed with patient in detail for 10 minutes and patient was encouraged to quit. Nicotine replacement options were discussed.         MDD (major depressive disorder)    Stable. Continue Wellbutrin, Trazodone, and Cymbalta       Chronic obstructive pulmonary disease with acute exacerbation    Due to pneumonia  O2 therapy  Nebulizers  IV solumedrol  PEFR       Diabetes mellitus with neuropathy    As evidence by examination          VTE Risk Mitigation (From admission, onward)        Ordered     apixaban tablet 5 mg  2 times daily      01/20/19 0044     IP VTE HIGH RISK PATIENT  Once      01/19/19 2208     Place DEVEN hose  Until discontinued      01/19/19 1758        Add mucinex      Ca Dupont NP  Department of Hospital Medicine   [facility]

## 2019-01-22 NOTE — SUBJECTIVE & OBJECTIVE
Interval History: no new issues. Still with dyspnea and wheezing, slowly improving. On home oxygen 2 liters    Review of Systems   Constitutional: Positive for activity change and fatigue. Negative for diaphoresis and fever.   Respiratory: Positive for cough, shortness of breath and wheezing.    Cardiovascular: Negative for chest pain and leg swelling.   Gastrointestinal: Negative for abdominal distention and abdominal pain.   Skin: Positive for color change (BLE chronic changes) and wound (left abdomen). Negative for rash.   Neurological: Positive for numbness. Negative for speech difficulty. Syncope: BLE.     Objective:     Vital Signs (Most Recent):  Temp: 97.9 °F (36.6 °C) (01/21/19 1939)  Pulse: 65 (01/21/19 1939)  Resp: 18 (01/21/19 1939)  BP: (!) 103/54 (01/21/19 1939)  SpO2: 100 % (01/21/19 1904) Vital Signs (24h Range):  Temp:  [96.5 °F (35.8 °C)-98 °F (36.7 °C)] 97.9 °F (36.6 °C)  Pulse:  [54-75] 65  Resp:  [17-20] 18  SpO2:  [92 %-100 %] 100 %  BP: ()/(52-56) 103/54     Weight: 132.4 kg (291 lb 14.2 oz)  Body mass index is 44.38 kg/m².    Intake/Output Summary (Last 24 hours) at 1/21/2019 2153  Last data filed at 1/21/2019 1800  Gross per 24 hour   Intake 540 ml   Output 2475 ml   Net -1935 ml      Physical Exam   Constitutional: She is oriented to person, place, and time. She appears well-developed and well-nourished.   HENT:   Head: Normocephalic and atraumatic.   Mouth/Throat: Oropharynx is clear and moist.   Eyes: Conjunctivae and EOM are normal. Pupils are equal, round, and reactive to light.   Neck: Normal range of motion. Neck supple.   Cardiovascular: Normal rate, regular rhythm and intact distal pulses.   Murmur heard.  Irregular irregular    Pulmonary/Chest: Effort normal. She has wheezes.   Exp wheezes and rhonchi   Abdominal: Soft. Bowel sounds are normal. There is no tenderness.   Grossly protuberant    Musculoskeletal: Normal range of motion.   Neurological: She is alert and oriented  to person, place, and time.   Decreased sensation to BLE   Skin: Skin is warm and dry.   Chronic vascular skin changes BLE, chronic abdominal ulcers to left lower abd-dressing intact. See nursing notes   Psychiatric: She has a normal mood and affect. Her behavior is normal. Judgment normal.   Nursing note and vitals reviewed.      Significant Labs:   CBC:   Recent Labs   Lab 01/20/19 0430 01/21/19 0437   WBC 10.10  10.10 9.30  9.30   HGB 11.0*  11.0* 10.6*  10.6*   HCT 35.3*  35.3* 34.7*  34.7*     209 222  222     CMP:   Recent Labs   Lab 01/20/19 0430 01/21/19 0437     136 138  138   K 4.8  4.8 4.3  4.3   CL 98  98 95  95   CO2 31*  31* 35*  35*   *  158* 128*  128*   BUN 12  12 21  21   CREATININE 0.7  0.7 0.9  0.9   CALCIUM 9.4  9.4 9.3  9.3   PROT 6.7 6.4   ALBUMIN 2.8* 2.7*   BILITOT 0.3 0.2   ALKPHOS 64 62   AST 16 14   ALT 13 12   ANIONGAP 7*  7* 8  8   EGFRNONAA >60  >60 >60  >60       Significant Imaging: I have reviewed and interpreted all pertinent imaging results/findings within the past 24 hours.

## 2019-01-22 NOTE — PLAN OF CARE
01/22/19 0720   Patient Assessment/Suction   Level of Consciousness (AVPU) alert   Respiratory Effort Normal;Unlabored   Expansion/Accessory Muscles/Retractions no use of accessory muscles;no retractions;expansion symmetric   All Lung Fields Breath Sounds diminished   Rhythm/Pattern, Respiratory depth regular;pattern regular;unlabored   Cough Frequency infrequent   Cough Type good;nonproductive   PRE-TX-O2-ETCO2   O2 Device (Oxygen Therapy) nasal cannula   $ Is the patient on Low Flow Oxygen? Yes   Flow (L/min) 2   SpO2 95 %   Pulse Oximetry Type Intermittent   $ Pulse Oximetry - Multiple Charge Pulse Oximetry - Multiple   Pulse 79   Resp 20   Aerosol Therapy   $ Aerosol Therapy Charges Aerosol Treatment   Respiratory Treatment Status (SVN) given   Treatment Route (SVN) mask   Patient Position (SVN) HOB elevated   Post Treatment Assessment (SVN) increased aeration   Signs of Intolerance (SVN) none   Breath Sounds Post-Respiratory Treatment   Throughout All Fields Post-Treatment All Fields   Throughout All Fields Post-Treatment aeration increased   Post-treatment Heart Rate (beats/min) 79   Post-treatment Resp Rate (breaths/min) 20       Aerosol treatments q4PRN with Duoneb. Breo q day.

## 2019-01-22 NOTE — PLAN OF CARE
[facility] sent patient information to [facility] for resumption of home health services through [facility].BERNICE Kirk    · 1/22/2019 10:19:46 AM Accepted  [email or web address]

## 2019-01-22 NOTE — ASSESSMENT & PLAN NOTE
IV Azithromycin and Rocephin  Obtain Sputum culture  Check Procalcitonin  Nebulizers/O2  Continue treatment.

## 2019-01-22 NOTE — PROGRESS NOTES
Thank you for the referral. The following patient has been assigned to Jayde Reyes RN with Outpatient Complex Care Management for high risk screening.    Reason for referral: Chronic obstructive pulmonary disease with acute exacerbation    Please contact [facility] at [phone] with any questions.    Thank you,    Maureen Cabezas, [facility]  Outpatient Care Mgmt.  [phone]

## 2019-01-22 NOTE — PLAN OF CARE
Problem: Adult Inpatient Plan of Care  Goal: Plan of Care Review  Outcome: Ongoing (interventions implemented as appropriate)  Pt waiting on daughter to come pick her up. Pt states she will take a shower before she goes home. Pt able to move self from BSC to bed and back w/o any problems.

## 2019-01-23 ENCOUNTER — TELEPHONE (OUTPATIENT)
Dept: FAMILY MEDICINE | Facility: CLINIC | Age: 69
End: 2019-01-23

## 2019-01-23 ENCOUNTER — OUTPATIENT CASE MANAGEMENT (OUTPATIENT)
Dept: ADMINISTRATIVE | Facility: OTHER | Age: 69
End: 2019-01-23

## 2019-01-23 ENCOUNTER — TELEPHONE (OUTPATIENT)
Dept: HEMATOLOGY/ONCOLOGY | Facility: CLINIC | Age: 69
End: 2019-01-23

## 2019-01-23 DIAGNOSIS — M43.12 SPONDYLOLISTHESIS OF CERVICAL REGION: ICD-10-CM

## 2019-01-23 DIAGNOSIS — M51.36 LUMBAR DEGENERATIVE DISC DISEASE: ICD-10-CM

## 2019-01-23 DIAGNOSIS — F43.20 ADULT SITUATIONAL STRESS DISORDER: ICD-10-CM

## 2019-01-23 NOTE — TELEPHONE ENCOUNTER
----- Message from Rajani Mc sent at 1/23/2019  2:07 PM CST -----  Contact: patient  Type: Needs Medical Advice    Who Called:  Patient  Symptoms (please be specific):    How long has patient had these symptoms:    Pharmacy name and phone #:  [facility]  Best Call Back Number: [phone]  Additional Information: requesting a call back,stated insurance is not going to covered it need to know what other alterative zaleplon (SONATA) 5 MG Cap

## 2019-01-23 NOTE — PROGRESS NOTES
01/23/19-  Attempt to screen patient for outpatient case management. No answer. Left message requesting call back. 1'st attempt to screen patient,

## 2019-01-23 NOTE — TELEPHONE ENCOUNTER
Called and spoke to pt to inform her that lab work needed to be complete before her apt tomorrow with  on 1/24. Pt confirmed lab apt scheduled and verbalized understanding.

## 2019-01-23 NOTE — TELEPHONE ENCOUNTER
Patient requesting a refill of Hydrocodone.  LR--12-27-18  LOV--12-28-18 (Ramon)  FOV--2-1-19 (Isabel)  Urine Toxicology--12-6-18  Pain Contract--12-6-18  Checked [place] Prescription Monitoring Program site. No unusual or abnormal behavior noted.

## 2019-01-24 ENCOUNTER — TELEPHONE (OUTPATIENT)
Dept: HEMATOLOGY/ONCOLOGY | Facility: CLINIC | Age: 69
End: 2019-01-24

## 2019-01-24 ENCOUNTER — TELEPHONE (OUTPATIENT)
Dept: MEDSURG UNIT | Facility: HOSPITAL | Age: 69
End: 2019-01-24

## 2019-01-24 RX ORDER — TRAZODONE HYDROCHLORIDE 100 MG/1
150 TABLET ORAL NIGHTLY
Qty: 90 TABLET | Refills: 2
Start: 2019-01-24 | End: 2019-04-22 | Stop reason: SDUPTHER

## 2019-01-24 RX ORDER — HYDROCODONE BITARTRATE AND ACETAMINOPHEN 10; 325 MG/1; MG/1
1 TABLET ORAL EVERY 8 HOURS PRN
Qty: 90 TABLET | Refills: 0 | Status: SHIPPED | OUTPATIENT
Start: 2019-01-24 | End: 2019-02-19 | Stop reason: SDUPTHER

## 2019-01-24 NOTE — TELEPHONE ENCOUNTER
----- Message from Shira Joshi sent at 1/24/2019  2:56 PM CST -----  Type: Needs to reschedule missed appointment    Who Called:  Patient  Best Call Back Number: [phone]  Additional Information: Patient needs to reschedule today's missed appointment/please call back to reschedule with patient.

## 2019-01-24 NOTE — HOSPITAL COURSE
Patient monitored closely during hospitalization. She was required increased oxygen from home 2 Liters. She received duo nebs, IV abx, and steroids. Wound care and PT consulted. She responded well to treatment and is stable for DC.     PE chest Coarse no wheezing . Heart irr irrg skin: chronic left hip/buttock ulcer with dressing intact

## 2019-01-24 NOTE — TELEPHONE ENCOUNTER
Dear Ms Villegas,  Please try to decrease Zaleplon, and Clonazepan due to interactions with pain medications.

## 2019-01-24 NOTE — DISCHARGE SUMMARY
[facility] Hospital Medicine  Discharge Summary      Patient Name: Nelly Tian  MRN: [id number]  Admission Date: 1/19/2019  Hospital Length of Stay: 3 days  Discharge Date and Time: 1/22/2019  1:49 PM  Attending Physician: No att. providers found   Discharging Provider: Ca Dupont NP  Primary Care Provider: Constantine Bird MD      HPI:   Ms. Tian is a 67yo F with a PMH of COPD with Asthma, CHF, HTN, DM2, A fib, MI, Morbid Obesity, and Current Smoker. She presents to the ED with c/o Cough. It strated about 2 weeks ago and is occassionally productive. She went to her PCP on 12/28 with c/o cough and congestion and prescribed tessalon perles. She gets SOB with the cough and has been using her night time O2 all day. Associated symptoms include HA, subjective fever, chills, and pleuritic chest pain. While in the ED, she met severe sepsis criteria and her CXR showed a RLL infiltrate. IV Rocephin, Azithromycin, and Solumedrol were initiated. Her Lactate was 2.7 and she was given a 500cc NS bolus. She currently reports breathing much better and wants to eat.        * No surgery found *      Hospital Course:   Patient monitored closely during hospitalization. She was required increased oxygen from home 2 Liters. She received duo nebs, IV abx, and steroids. Wound care and PT consulted. She responded well to treatment and is stable for DC.     PE chest Coarse no wheezing . Heart irr irrg skin: chronic left hip/buttock ulcer with dressing intact     Consults:   Consults (From admission, onward)        Status Ordering Provider     IP consult to case management  Once     Provider:  (Not yet assigned)    Completed EARL HENRIQUEZ new Assessment & Plan notes have been filed under this hospital service since the last note was generated.  Service: Hospital Medicine    Final Active Diagnoses:    Diagnosis Date Noted POA    PRINCIPAL PROBLEM:  Severe sepsis [A41.9, R65.20] 01/20/2019 Yes     Chronic pain syndrome [G89.4] 01/20/2019 Yes    Wound, open, abdominal wall, lateral [S31.109A] 01/20/2019 Yes    Community acquired pneumonia of right lower lobe of lung [J18.1] 01/19/2019 Yes    Chronic combined systolic and diastolic CHF (congestive heart failure) [I50.42] 08/23/2018 Yes    Hypercapnic respiratory failure, chronic [J96.12] 11/16/2016 Yes    Type 2 diabetes mellitus with diabetic neuropathy, without long-term current use of insulin [E11.40] 11/02/2016 Yes    Hyperlipidemia associated with type 2 diabetes mellitus [E11.69, E78.5] 11/02/2016 Yes    PVD (peripheral vascular disease) [I73.9] 01/19/2016 Yes    Hypertension associated with diabetes [E11.59, I10] 01/19/2016 Yes    Morbid obesity with BMI of 40.0-44.9, adult [E66.01, Z68.41] 11/10/2015 Not Applicable    Chronic atrial fibrillation [I48.2] 11/10/2015 Yes    Tobacco dependency [F17.200] 12/18/2014 Yes    Chronic obstructive pulmonary disease with acute exacerbation [J44.1]  Yes    MDD (major depressive disorder) [F32.9]  Yes    Diabetes mellitus with neuropathy [E11.40] 08/13/2012 Yes     Chronic      Problems Resolved During this Admission:       Discharged Condition: stable    Disposition: Home-Health Care [facility]    Follow Up:  Follow-up Information     [facility].    Specialty:  Home Health Services  Why:  Home Health  Contact information:  [address] [place] [address]  [phone]                 Patient Instructions:      Referral to Home health   Referral Priority: Routine Referral Type: Home Health   Referral Reason: Specialty Services Required   Requested Specialty: Home Health Services   Number of Visits Requested: 1     Ambulatory referral to Outpatient Case Management   Referral Priority: Routine Referral Type: Consultation   Referral Reason: Specialty Services Required   Number of Visits Requested: 1     Diet Cardiac     Diet diabetic     Notify your health care provider if you  experience any of the following:  persistent dizziness, light-headedness, or visual disturbances     Notify your health care provider if you experience any of the following:  worsening rash     Notify your health care provider if you experience any of the following:  severe persistent headache     Notify your health care provider if you experience any of the following:  difficulty breathing or increased cough     Notify your health care provider if you experience any of the following:  redness, tenderness, or signs of infection (pain, swelling, redness, odor or green/yellow discharge around incision site)     Notify your health care provider if you experience any of the following:  severe uncontrolled pain     Notify your health care provider if you experience any of the following:  persistent nausea and vomiting or diarrhea     Notify your health care provider if you experience any of the following:  temperature >100.4     Activity as tolerated       Significant Diagnostic Studies: Labs:   BMP:   Recent Labs   Lab 01/22/19  0701   GLU 86  86     143   K 4.3  4.3   CL 99  99   CO2 34*  34*   BUN 16  16   CREATININE 0.7  0.7   CALCIUM 9.5  9.5   MG 2.6   , CMP   Recent Labs   Lab 01/22/19  0701     143   K 4.3  4.3   CL 99  99   CO2 34*  34*   GLU 86  86   BUN 16  16   CREATININE 0.7  0.7   CALCIUM 9.5  9.5   PROT 6.8   ALBUMIN 3.1*   BILITOT 0.3   ALKPHOS 59   AST 16   ALT 14   ANIONGAP 10  10   ESTGFRAFRICA >60  >60   EGFRNONAA >60  >60   , Lipid Panel   Lab Results   Component Value Date    CHOL 136 06/13/2018    HDL 39 (L) 06/13/2018    LDLCALC 69.0 06/13/2018    TRIG 140 06/13/2018    CHOLHDL 28.7 06/13/2018   , Troponin   Recent Labs   Lab 01/19/19  2232   TROPONINI 0.012    and All labs within the past 24 hours have been reviewed  Radiology: X-Ray: CXR: X-Ray Chest 1 View (CXR): No results found for this visit on 01/19/19.    Pending Diagnostic Studies:     None          Medications:  Reconciled Home Medications:      Medication List      START taking these medications    azithromycin 500 MG tablet  Commonly known as:  ZITHROMAX  Take 1 tablet (500 mg total) by mouth once daily. for 2 days     guaiFENesin 600 mg 12 hr tablet  Commonly known as:  MUCINEX  Take 1 tablet (600 mg total) by mouth 2 (two) times daily.     predniSONE 20 MG tablet  Commonly known as:  DELTASONE  Take 1 tablet (20 mg total) by mouth once daily. for 5 days        CHANGE how you take these medications    ascorbic acid (vitamin C) 500 MG tablet  Commonly known as:  VITAMIN C  Take 1 tablet (500 mg total) by mouth every evening.  What changed:    · how much to take  · when to take this        CONTINUE taking these medications    * albuterol 90 mcg/actuation inhaler  Commonly known as:  VENTOLIN HFA  Inhale 2 puffs into the lungs every 6 (six) hours as needed. Rescue     * albuterol 0.63 mg/3 mL Nebu  Commonly known as:  ACCUNEB  Take 3 mLs (0.63 mg total) by nebulization every 6 (six) hours as needed. Rescue     apixaban 5 mg Tab  Commonly known as:  ELIQUIS  Take 1 tablet (5 mg total) by mouth 2 (two) times daily.     atorvastatin 20 MG tablet  Commonly known as:  LIPITOR  Take 1 tablet (20 mg total) by mouth once daily.     BREO ELLIPTA 100-25 mcg/dose diskus inhaler  Generic drug:  fluticasone-vilanterol  INHALE 1 PUFF INTO THE LUNGS ONCE DAILY.     budesonide 0.5 mg/2 mL nebulizer solution  Commonly known as:  PULMICORT  Take 2 mLs (0.5 mg total) by nebulization once daily. Controller     buPROPion 100 MG tablet  Commonly known as:  WELLBUTRIN  Take 1 tablet (100 mg total) by mouth 2 (two) times daily.     clonazePAM 1 MG tablet  Commonly known as:  KLONOPIN  1 tab po bid prn     diclofenac sodium 1.5 % Drop     DULoxetine 30 MG capsule  Commonly known as:  CYMBALTA     fluticasone 50 mcg/actuation nasal spray  Commonly known as:  FLONASE  1 spray (50 mcg total) by Each Nare route once daily.      furosemide 40 MG tablet  Commonly known as:  LASIX  TAKE 1 TABLET ONE TIME DAILY  FOR  FLUID  OVERLOAD     gabapentin 800 MG tablet  Commonly known as:  NEURONTIN  Take 1 tablet (800 mg total) by mouth 3 (three) times daily.     HYDROcodone-acetaminophen  mg per tablet  Commonly known as:  NORCO  Take 1 tablet by mouth every 8 (eight) hours as needed for Pain (Do not take more than 3 a day. Do not mix with other narcotics.).     levalbuterol 0.63 mg/3 mL nebulizer solution  Commonly known as:  XOPENEX  INHALE THE CONTENTS OF 1 VIAL BY NEBULIZATION 4 times  DAILY.    DX: J43.9     lisinopril 20 MG tablet  Commonly known as:  PRINIVIL,ZESTRIL  Take 1 tablet (20 mg total) by mouth once daily.     loratadine 10 mg tablet  Commonly known as:  CLARITIN  Take 1 tablet (10 mg total) by mouth once daily.     metFORMIN 500 MG tablet  Commonly known as:  GLUCOPHAGE  Take 1 tablet (500 mg total) by mouth 2 (two) times daily with meals.     metoprolol succinate 25 MG 24 hr tablet  Commonly known as:  TOPROL-XL  Take 0.5 tablets (12.5 mg total) by mouth once daily.     multivitamin tablet  Commonly known as:  THERAGRAN  Take 1 tablet by mouth once daily.     nystatin powder  Commonly known as:  NYSTOP  Apply topically 2 (two) times daily.     nystatin-triamcinolone cream  Commonly known as:  MYCOLOG II  Apply topically 3 (three) times daily.     omega-3 acid ethyl esters 1 gram capsule  Commonly known as:  LOVAZA  2 (two) times daily.     omeprazole 20 MG capsule  Commonly known as:  PRILOSEC  Take 1 capsule (20 mg total) by mouth once daily.     ondansetron 8 MG Tbdl  Commonly known as:  ZOFRAN-ODT  Take 1 tablet (8 mg total) by mouth every 6 (six) hours as needed.     polyethylene glycol 17 gram/dose powder  Commonly known as:  GLYCOLAX     potassium chloride SA 20 MEQ tablet  Commonly known as:  KLOR-CON M20  Take 1 tablet (20 mEq total) by mouth once daily.     senna-docusate 8.6-50 mg 8.6-50 mg per tablet  Commonly  known as:  PERICOLACE  Take 1 tablet by mouth 2 (two) times daily as needed for Constipation.     spironolactone 25 MG tablet  Commonly known as:  ALDACTONE  Take 1 tablet (25 mg total) by mouth once daily.     traZODone 100 MG tablet  Commonly known as:  DESYREL  Take 1 tablet (100 mg total) by mouth every evening.     zaleplon 5 MG Cap  Commonly known as:  SONATA  Take 1 capsule (5 mg total) by mouth every evening.         * This list has 2 medication(s) that are the same as other medications prescribed for you. Read the directions carefully, and ask your doctor or other care provider to review them with you.                Indwelling Lines/Drains at time of discharge:   Lines/Drains/Airways          None          Time spent on the discharge of patient: 30 minutes  Patient was seen and examined on the date of discharge and determined to be suitable for discharge.         Ca Dupont NP  Department of Hospital Medicine  [facility]

## 2019-01-25 ENCOUNTER — TELEPHONE (OUTPATIENT)
Dept: HEMATOLOGY/ONCOLOGY | Facility: CLINIC | Age: 69
End: 2019-01-25

## 2019-01-25 LAB
BACTERIA BLD CULT: NORMAL
BACTERIA BLD CULT: NORMAL

## 2019-01-27 NOTE — PLAN OF CARE
Pt given education on lovenox injections. Pt gave herself 1900 dose of Lovenox. Pt states she feels comfortable giving self-injections.    Attending

## 2019-01-28 ENCOUNTER — OUTPATIENT CASE MANAGEMENT (OUTPATIENT)
Dept: ADMINISTRATIVE | Facility: OTHER | Age: 69
End: 2019-01-28

## 2019-01-28 NOTE — PROGRESS NOTES
01/28/19-  Attempted to screen patient for outpatient case management. Patient is taking a shower.  Left message requesting call back. 2'nd attempt to screen patient.

## 2019-01-30 ENCOUNTER — OUTPATIENT CASE MANAGEMENT (OUTPATIENT)
Dept: ADMINISTRATIVE | Facility: OTHER | Age: 69
End: 2019-01-30

## 2019-01-30 NOTE — LETTER
January 30, 2019    Nelly Tian  [address] [place] [address]             [facility] [address] [address] Dear MS Tian:    I work with [facility]s outpatient case management department. We received a referral to call you to discuss your medical history. I attempted to reach you by telephone but I was unsuccessful. Please call our department that we may go over some questions with you regarding your health. My phone number is [phone].    The outpatient case management department can be reached at [phone] from 8:00 am to 4:30 PM on Monday thru Friday. [facility] also has a program where a nurse is available 24/7 to answer questions or provide medical advice their number is [phone].    If you have any questions or concerns, please dont hesitate to call.     Sincerely,         Jayde Reyes RN   [facility]  Outpatient Complex Care Management  [phone]

## 2019-01-30 NOTE — PROGRESS NOTES
01/30/19-Attempt to screen patient for outpatient case management. Third attempt to screen patient. Will mail letter and close case.

## 2019-02-01 ENCOUNTER — TELEPHONE (OUTPATIENT)
Dept: HEMATOLOGY/ONCOLOGY | Facility: CLINIC | Age: 69
End: 2019-02-01

## 2019-02-01 NOTE — TELEPHONE ENCOUNTER
----- Message from Rajani Mc sent at 2/1/2019 10:46 AM CST -----  Contact: patient  Type: Needs Medical Advice    Who Called:  Patient  Symptoms (please be specific):    How long has patient had these symptoms:    Pharmacy name and phone #:    Best Call Back Number: [phone]  Additional Information: requesting a call back to reschedule labs,due to transportation

## 2019-02-01 NOTE — TELEPHONE ENCOUNTER
Called and spoke to pt to reschedule lab apt due to transportation. Pt confirmed apt rescheduled and verbalized understanding.

## 2019-02-07 ENCOUNTER — LAB VISIT (OUTPATIENT)
Dept: LAB | Facility: HOSPITAL | Age: 69
End: 2019-02-07
Attending: INTERNAL MEDICINE
Payer: MEDICARE

## 2019-02-07 DIAGNOSIS — Z86.39 HISTORY OF IRON DEFICIENCY: ICD-10-CM

## 2019-02-07 DIAGNOSIS — D64.9 ANEMIA, UNSPECIFIED TYPE: ICD-10-CM

## 2019-02-07 LAB
BASOPHILS # BLD AUTO: 0 K/UL
BASOPHILS NFR BLD: 0.3 %
DIFFERENTIAL METHOD: ABNORMAL
EOSINOPHIL # BLD AUTO: 0.2 K/UL
EOSINOPHIL NFR BLD: 2.5 %
ERYTHROCYTE [DISTWIDTH] IN BLOOD BY AUTOMATED COUNT: 17.7 %
HCT VFR BLD AUTO: 35.8 %
HGB BLD-MCNC: 11 G/DL
IRON SERPL-MCNC: 27 UG/DL
LYMPHOCYTES # BLD AUTO: 1.1 K/UL
LYMPHOCYTES NFR BLD: 14.4 %
MCH RBC QN AUTO: 25.7 PG
MCHC RBC AUTO-ENTMCNC: 30.7 G/DL
MCV RBC AUTO: 84 FL
MONOCYTES # BLD AUTO: 0.6 K/UL
MONOCYTES NFR BLD: 7.8 %
NEUTROPHILS # BLD AUTO: 5.9 K/UL
NEUTROPHILS NFR BLD: 75 %
PLATELET # BLD AUTO: 239 K/UL
PMV BLD AUTO: 9.1 FL
RBC # BLD AUTO: 4.27 M/UL
SATURATED IRON: 6 %
TOTAL IRON BINDING CAPACITY: 435 UG/DL
TRANSFERRIN SERPL-MCNC: 294 MG/DL
WBC # BLD AUTO: 7.9 K/UL

## 2019-02-07 PROCEDURE — 36415 COLL VENOUS BLD VENIPUNCTURE: CPT

## 2019-02-07 PROCEDURE — 85025 COMPLETE CBC W/AUTO DIFF WBC: CPT

## 2019-02-07 PROCEDURE — 83540 ASSAY OF IRON: CPT

## 2019-02-08 RX ORDER — METFORMIN HYDROCHLORIDE 500 MG/1
500 TABLET ORAL 2 TIMES DAILY WITH MEALS
Qty: 180 TABLET | Refills: 3 | Status: SHIPPED | OUTPATIENT
Start: 2019-02-08 | End: 2020-02-26 | Stop reason: SDUPTHER

## 2019-02-09 RX ORDER — SILVER SULFADIAZINE 10 G/1000G
CREAM TOPICAL
Qty: 50 G | Refills: 3 | Status: SHIPPED | OUTPATIENT
Start: 2019-02-09 | End: 2019-04-25

## 2019-02-13 ENCOUNTER — PATIENT OUTREACH (OUTPATIENT)
Dept: ADMINISTRATIVE | Facility: CLINIC | Age: 69
End: 2019-02-13

## 2019-02-13 NOTE — TELEPHONE ENCOUNTER
2/13/2019@1135 made [facility] HF call post d/c, pt states doing well, received [facility]  at d/c, no needs at this time.diandra

## 2019-02-14 ENCOUNTER — OFFICE VISIT (OUTPATIENT)
Dept: HEMATOLOGY/ONCOLOGY | Facility: CLINIC | Age: 69
End: 2019-02-14
Payer: MEDICARE

## 2019-02-14 VITALS
RESPIRATION RATE: 28 BRPM | DIASTOLIC BLOOD PRESSURE: 49 MMHG | TEMPERATURE: 98 F | WEIGHT: 281.06 LBS | SYSTOLIC BLOOD PRESSURE: 109 MMHG | BODY MASS INDEX: 42.6 KG/M2 | HEART RATE: 73 BPM | HEIGHT: 68 IN

## 2019-02-14 DIAGNOSIS — J40 BRONCHITIS: Primary | ICD-10-CM

## 2019-02-14 DIAGNOSIS — D64.9 ANEMIA, UNSPECIFIED TYPE: ICD-10-CM

## 2019-02-14 DIAGNOSIS — E61.1 IRON DEFICIENCY: ICD-10-CM

## 2019-02-14 DIAGNOSIS — R06.2 WHEEZING: ICD-10-CM

## 2019-02-14 DIAGNOSIS — B99.9 RECURRENT INFECTIONS: ICD-10-CM

## 2019-02-14 DIAGNOSIS — R79.9 ABNORMAL FINDING OF BLOOD CHEMISTRY: ICD-10-CM

## 2019-02-14 PROCEDURE — 3074F PR MOST RECENT SYSTOLIC BLOOD PRESSURE < 130 MM HG: ICD-10-PCS | Mod: CPTII,S$GLB,, | Performed by: INTERNAL MEDICINE

## 2019-02-14 PROCEDURE — 3078F PR MOST RECENT DIASTOLIC BLOOD PRESSURE < 80 MM HG: ICD-10-PCS | Mod: CPTII,S$GLB,, | Performed by: INTERNAL MEDICINE

## 2019-02-14 PROCEDURE — 1101F PT FALLS ASSESS-DOCD LE1/YR: CPT | Mod: CPTII,S$GLB,, | Performed by: INTERNAL MEDICINE

## 2019-02-14 PROCEDURE — 3078F DIAST BP <80 MM HG: CPT | Mod: CPTII,S$GLB,, | Performed by: INTERNAL MEDICINE

## 2019-02-14 PROCEDURE — 99215 OFFICE O/P EST HI 40 MIN: CPT | Mod: S$GLB,,, | Performed by: INTERNAL MEDICINE

## 2019-02-14 PROCEDURE — 3074F SYST BP LT 130 MM HG: CPT | Mod: CPTII,S$GLB,, | Performed by: INTERNAL MEDICINE

## 2019-02-14 PROCEDURE — 99999 PR PBB SHADOW E&M-EST. PATIENT-LVL III: CPT | Mod: PBBFAC,,, | Performed by: INTERNAL MEDICINE

## 2019-02-14 PROCEDURE — 1101F PR PT FALLS ASSESS DOC 0-1 FALLS W/OUT INJ PAST YR: ICD-10-PCS | Mod: CPTII,S$GLB,, | Performed by: INTERNAL MEDICINE

## 2019-02-14 PROCEDURE — 99999 PR PBB SHADOW E&M-EST. PATIENT-LVL III: ICD-10-PCS | Mod: PBBFAC,,, | Performed by: INTERNAL MEDICINE

## 2019-02-14 PROCEDURE — 99215 PR OFFICE/OUTPT VISIT, EST, LEVL V, 40-54 MIN: ICD-10-PCS | Mod: S$GLB,,, | Performed by: INTERNAL MEDICINE

## 2019-02-14 RX ORDER — ZALEPLON 5 MG/1
CAPSULE ORAL
Refills: 3 | COMMUNITY
Start: 2019-01-29 | End: 2019-04-25

## 2019-02-14 RX ORDER — AZITHROMYCIN 250 MG/1
TABLET, FILM COATED ORAL
Qty: 6 TABLET | Refills: 0 | Status: SHIPPED | OUTPATIENT
Start: 2019-02-14 | End: 2019-02-19

## 2019-02-14 RX ORDER — CLONAZEPAM 1 MG/1
TABLET ORAL
Refills: 2 | COMMUNITY
Start: 2019-01-29 | End: 2019-04-25 | Stop reason: DRUGHIGH

## 2019-02-14 NOTE — PROGRESS NOTES
FOLLOWUP to hospital disxcharge   Records reviewed  CHIEF COMPLAINT:I just finished meds for a cough    Ms. Tian is a 68 -year-old female who has a history of progressive anemia after  IV iron.  She is continuing to see the wound doctor DR Conroy for ulcers on her left hip   Here today for routine labs for anemia. SHe has been coughing up green phlegm for over a week post amoxil. She is tired and feels sob, no hemoptysis, SHe is wheezing    She is tolerating Glucophage for diabetes as well as Lexapro for depression and Zestril  for hypertension  Trazodone not helping her sleep  She had pneumonia  Still coughing , congested , tired   Past Medical History:   Diagnosis Date    *Atrial flutter     Angina pectoris 9/18/2017    Anxiety     Arthritis     Asthma     Atrial fibrillation     Back pain     Cataract     OD    CHF (congestive heart failure)     COPD (chronic obstructive pulmonary disease)     Depression     Diabetes mellitus     Emphysema of lung     Heart failure     Hepatomegaly 2/3/2016    Hernia     History of MI (myocardial infarction) 1/19/2016    Hypercapnic respiratory failure, chronic 11/16/2016    Hyperlipidemia     Hypertension     Iron deficiency anemia 2/3/2016    Myocardial infarction     Obesity     Peripheral vascular disease     Pneumonia     Polyneuropathy     Retinal detachment     OS    Septic shock 4/23/2017    Skin ulcer 3/18/2017    Tobacco dependence     Type II 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, not stated as uncontrolled(250.60)          Current Outpatient Medications:     albuterol (ACCUNEB) 0.63 mg/3 mL Nebu, Take 3 mLs (0.63 mg total) by nebulization every 6 (six) hours as needed. Rescue, Disp: 75 mL, Rfl: 11    albuterol (VENTOLIN HFA) 90 mcg/actuation inhaler, Inhale 2 puffs into the lungs every 6 (six) hours as needed. Rescue, Disp: 3 Inhaler, Rfl: 3    apixaban (ELIQUIS) 5 mg Tab, Take 1 tablet (5 mg total)  by mouth 2 (two) times daily., Disp: 180 tablet, Rfl: 3    ascorbic acid, vitamin C, (VITAMIN C) 500 MG tablet, Take 1 tablet (500 mg total) by mouth every evening. (Patient taking differently: Take 1,000 mg by mouth 2 (two) times daily. ), Disp: , Rfl:     atorvastatin (LIPITOR) 20 MG tablet, Take 1 tablet (20 mg total) by mouth once daily., Disp: 90 tablet, Rfl: 3    BREO ELLIPTA 100-25 mcg/dose diskus inhaler, INHALE 1 PUFF INTO THE LUNGS ONCE DAILY., Disp: 60 each, Rfl: 2    budesonide (PULMICORT) 0.5 mg/2 mL nebulizer solution, Take 2 mLs (0.5 mg total) by nebulization once daily. Controller, Disp: 180 mL, Rfl: 4    buPROPion (WELLBUTRIN) 100 MG tablet, Take 1 tablet (100 mg total) by mouth 2 (two) times daily., Disp: 60 tablet, Rfl: 11    clonazePAM (KLONOPIN) 1 MG tablet, , Disp: , Rfl: 2    diclofenac sodium 1.5 % Drop, , Disp: , Rfl:     DULoxetine (CYMBALTA) 30 MG capsule, , Disp: , Rfl:     fluticasone (FLONASE) 50 mcg/actuation nasal spray, 1 spray (50 mcg total) by Each Nare route once daily., Disp: 1 Bottle, Rfl: 2    furosemide (LASIX) 40 MG tablet, TAKE 1 TABLET ONE TIME DAILY  FOR  FLUID  OVERLOAD, Disp: 90 tablet, Rfl: 3    gabapentin (NEURONTIN) 800 MG tablet, Take 1 tablet (800 mg total) by mouth 3 (three) times daily., Disp: 270 tablet, Rfl: 3    guaiFENesin (MUCINEX) 600 mg 12 hr tablet, Take 1 tablet (600 mg total) by mouth 2 (two) times daily., Disp: , Rfl:     HYDROcodone-acetaminophen (NORCO)  mg per tablet, Take 1 tablet by mouth every 8 (eight) hours as needed for Pain (Do not take more than 3 a day. Do not mix with other narcotics.)., Disp: 90 tablet, Rfl: 0    levalbuterol (XOPENEX) 0.63 mg/3 mL nebulizer solution, INHALE THE CONTENTS OF 1 VIAL BY NEBULIZATION 4 times  DAILY.    DX: J43.9, Disp: 792 mL, Rfl: 3    lisinopril (PRINIVIL,ZESTRIL) 20 MG tablet, Take 1 tablet (20 mg total) by mouth once daily., Disp: 90 tablet, Rfl: 1    loratadine (CLARITIN) 10 mg  tablet, Take 1 tablet (10 mg total) by mouth once daily., Disp: , Rfl: 0    metFORMIN (GLUCOPHAGE) 500 MG tablet, TAKE 1 TABLET (500 MG TOTAL) BY MOUTH 2 (TWO) TIMES DAILY WITH MEALS., Disp: 180 tablet, Rfl: 3    metoprolol succinate (TOPROL-XL) 25 MG 24 hr tablet, Take 0.5 tablets (12.5 mg total) by mouth once daily., Disp: 45 tablet, Rfl: 3    multivitamin (THERAGRAN) tablet, Take 1 tablet by mouth once daily., Disp: , Rfl:     nystatin (NYSTOP) powder, Apply topically 2 (two) times daily., Disp: 120 g, Rfl: 3    nystatin-triamcinolone (MYCOLOG II) cream, Apply topically 3 (three) times daily., Disp: 30 g, Rfl: 1    omega-3 acid ethyl esters (LOVAZA) 1 gram capsule, 2 (two) times daily. , Disp: , Rfl:     omeprazole (PRILOSEC) 20 MG capsule, Take 1 capsule (20 mg total) by mouth once daily., Disp: 30 capsule, Rfl: 11    ondansetron (ZOFRAN-ODT) 8 MG TbDL, Take 1 tablet (8 mg total) by mouth every 6 (six) hours as needed., Disp: 20 tablet, Rfl: 3    polyethylene glycol (GLYCOLAX) 17 gram/dose powder, , Disp: , Rfl:     potassium chloride SA (KLOR-CON M20) 20 MEQ tablet, Take 1 tablet (20 mEq total) by mouth once daily., Disp: 30 tablet, Rfl: 6    senna-docusate 8.6-50 mg (PERICOLACE) 8.6-50 mg per tablet, Take 1 tablet by mouth 2 (two) times daily as needed for Constipation., Disp: , Rfl:     silver sulfADIAZINE 1% (SILVADENE) 1 % cream, APPLY TOPICALLY ONCE DAILY., Disp: 50 g, Rfl: 3    spironolactone (ALDACTONE) 25 MG tablet, Take 1 tablet (25 mg total) by mouth once daily., Disp: 30 tablet, Rfl: 11    traZODone (DESYREL) 100 MG tablet, Take 1.5 tablets (150 mg total) by mouth every evening., Disp: 90 tablet, Rfl: 2    zaleplon (SONATA) 5 MG Cap, , Disp: , Rfl: 3    REVIEW OF SYSTEMS: extreme exhaustion  GENERAL:  No progression of fatigue nor SOB  She is obese.  Difficulty  walking, no knee pain today     HEENT:  No photophobia, ++ rhinorrhea   RESPIRATORY:  +cough dry now + wheezing.positive  "congestion CONTINUES  CARDIOVASCULAR no   chest pain, NO  palpitations  GASTROINTESTINAL:  No abdominal pain, dysphagia, emesis, diarrhea, or  constipation.    GENITOURINARY:  No urinary frequency, hesitancy  RHEUM:  no arthralgias or joint swelling.  MUSCOLSKELETAL:  No neck pain, back pain, positive  arthralgias  NEUROLOGICAL:  No headaches, positive dizziness, + paresthesias  PSYCH:  No agitation, change in behavior, or anxiety.  ENDOCRINE:  No hot or cold intolerance.      PHYSICAL EXAMINATION:  BP (!) 109/49   Pulse 73   Temp 97.5 °F (36.4 °C)   Resp (!) 28   Ht 5' 8" (1.727 m)   Wt 127.5 kg (281 lb 1.4 oz)   LMP  (LMP Unknown)   BMI 42.74 kg/m²     GENERAL:  She is obese.  She is oriented, appears ill   PSYCH:  Flat affect.  No anxiety or depression.  HEENT:  Normocephalic.  Lids intact, conjunctivae pale     OP clear,  ++  palatal pallor.  NECK:  Supple.  Trachea midline.  No palpable abnormalities.  CHEST:+ coarse BS, + fremitus  Decreased BS right base    CV S1S2 with RRR  ABDOMEN:  NT, ND.  Normal bowel sounds.  No palpable HSM or mass.  EXTREMITIES:  Legs,  2 +  pitting edema.bilaterally   NEUROLOGICAL:  Patient is ambulating well with walker    SKIN:  Warm, dry.  Ecchymosis evident.  No tenting, petechiae    To DR Carrasco for injections on knee  Zan Bradshaw MD 1/19/2019       Narrative     EXAMINATION:  XR CHEST PA AND LATERAL    CLINICAL HISTORY:  Cough;    TECHNIQUE:  PA and lateral views of the chest were performed.    COMPARISON:  Chest x-ray dated 11/09/2018    FINDINGS:  There is left basilar atelectasis or scar but there is a developing infiltrate or atelectasis in the right lung base which was not present previously.  There is no pneumothorax or evidence of congestive failure.      Impression       1. Developing infiltrate or atelectasis in the right lower lobe with minimal left basilar atelectasis or scar.  This report was flagged in Epic as abnormal.      Electronically signed " by: Zan Bradshaw MD  Date: 01/19/2019  Time: 16:20       LABS:    reviewed  Ref Range & Dlzyj8k ago  3wk ago  WBC3.90 - 12.70 K/uL7.90 10.30 RBC4.00 - 5.40 M/uL4.27 4.23 Kgnjykatpx35.0 - 16.0 g/dL11.0 Abnormally low  11.3 Abnormally low  Yvwqysbyjo49.0 - 48.5 %35.8 Abnormally low  35.6 Abnormally low  MCV82 - 98 fL84 84 MCH27.0 - 31.0 pg25.7 Abnormally low  26.8 Abnormally low  MCHC32.0 - 36.0 g/dL30.7 Abnormally low  31.8 Abnormally low  RDW11.5 - 14.5 %17.7 Abnormally high  16.3 Abnormally high  Npefprdgy497 - 350 K/uL239 216 MPV9.2 - 12.9 fL9.1 Abnormally low     Ref Range & Mlcnf0v ago  2mo ago  Iron30 - 160 ug/dL27 Abnormally low  33 Qpzgnxuqdds073 - 375 mg/dL294 301 ECVT689 - 450 ug/dL435 445 Saturated Iron20 - 50 %6 Abnormally low  7 Abnormally low     Lab Results   Component Value Date    WBC 7.90 02/07/2019    HGB 11.0 (L) 02/07/2019    HCT 35.8 (L) 02/07/2019    MCV 84 02/07/2019     02/07/2019     Iron and TIBC   Order: [id number]   Status:  Final result   Visible to patient:  Yes (Patient Portal) Next appt:  05/21/2018 at 05:20 PM in Family Medicine (Constantine Bird MD) Dx:  History of anemia     Ref Range & Units 2d ago 2mo ago    Iron 30 - 160 ug/dL 56  51     Transferrin 200 - 375 mg/dL 317  324     TIBC 250 - 450 ug/dL 469   480      Saturated Iron 20 - 50 % 12   11     Resulting Agency  [facility]      Specimen Collected: 03/12/18 14:04 Last Resulted: 03/13/18 01:02 Lab Flowsheet Order Details View Encounter Lab and Collection Details                 IMPRESSION AND PLAN:        Bronchitis  -     azithromycin (Z-MURTAZA) 250 MG tablet; Take 2 tablets by mouth on day 1; Take 1 tablet by mouth on days 2-5  Dispense: 6 tablet; Refill: 0  -     CBC auto differential; Future; Expected date: 02/14/2019  -     Iron and TIBC; Future; Expected date: 02/14/2019  -     IGG 1, 2, 3, AND 4; Future; Expected date: 02/14/2019    Anemia, unspecified type    Wheezing    Iron deficiency    Recurrent infections  -      CBC auto differential; Future; Expected date: 02/14/2019  -     Iron and TIBC; Future; Expected date: 02/14/2019  -     IGG 1, 2, 3, AND 4; Future; Expected date: 02/14/2019    Abnormal finding of blood chemistry   -     Iron and TIBC; Future; Expected date: 02/14/2019       Signed and reviewed IV iron orders   Anemia with worsening iron   Proceed with two doses of Iv iron   Again BMI too high   Focus on decreasing weight to decrease her risk of a thrombotic event  She is high risk due to sedentary lifestyle and obesity    RTC 5 weeks with labs   Adding z pack     Cont lipitor to prevent plaques  Cont lasix to help with edema  Watch for peripheral destruction of cells with hepatomegaly  She is to continue Coumadin for chronic atrial fibrillation as well as her diabetic medication to help control hyperglycemia due to diabetes

## 2019-02-19 DIAGNOSIS — E11.610 DIABETIC NEUROGENIC ARTHROPATHY: ICD-10-CM

## 2019-02-19 DIAGNOSIS — M43.12 SPONDYLOLISTHESIS OF CERVICAL REGION: ICD-10-CM

## 2019-02-19 DIAGNOSIS — M51.36 LUMBAR DEGENERATIVE DISC DISEASE: ICD-10-CM

## 2019-02-19 DIAGNOSIS — I50.22 CHRONIC SYSTOLIC CONGESTIVE HEART FAILURE: ICD-10-CM

## 2019-02-19 DIAGNOSIS — J43.9 PULMONARY EMPHYSEMA, UNSPECIFIED EMPHYSEMA TYPE: ICD-10-CM

## 2019-02-19 RX ORDER — LEVALBUTEROL INHALATION SOLUTION 0.63 MG/3ML
SOLUTION RESPIRATORY (INHALATION)
Qty: 792 ML | Refills: 3 | Status: SHIPPED | OUTPATIENT
Start: 2019-02-19 | End: 2019-04-22 | Stop reason: SDUPTHER

## 2019-02-19 RX ORDER — NYSTATIN AND TRIAMCINOLONE ACETONIDE 100000; 1 [USP'U]/G; MG/G
CREAM TOPICAL 3 TIMES DAILY
Qty: 30 G | Refills: 1 | Status: SHIPPED | OUTPATIENT
Start: 2019-02-19 | End: 2019-04-22 | Stop reason: SDUPTHER

## 2019-02-19 RX ORDER — GABAPENTIN 800 MG/1
800 TABLET ORAL 3 TIMES DAILY
Qty: 270 TABLET | Refills: 3 | Status: SHIPPED | OUTPATIENT
Start: 2019-02-19 | End: 2020-01-03

## 2019-02-19 RX ORDER — FUROSEMIDE 40 MG/1
TABLET ORAL
Qty: 90 TABLET | Refills: 3 | Status: SHIPPED | OUTPATIENT
Start: 2019-02-19 | End: 2019-08-16

## 2019-02-19 NOTE — TELEPHONE ENCOUNTER
Patient requesting a refill of Hydrocodone.  LR--1/24/19  LOV--12/28/18 (Ramon)  FOV--12/2/19 (Pelon)  Urine Toxicology--12/6/18  Pain Contract--12/6/18  Checked [place] Prescription Monitoring Program site. No unusual or abnormal behavior noted.

## 2019-02-20 ENCOUNTER — PATIENT MESSAGE (OUTPATIENT)
Dept: ORTHOPEDICS | Facility: CLINIC | Age: 69
End: 2019-02-20

## 2019-02-20 RX ORDER — HYDROCODONE BITARTRATE AND ACETAMINOPHEN 10; 325 MG/1; MG/1
1 TABLET ORAL EVERY 8 HOURS PRN
Qty: 90 TABLET | Refills: 0 | Status: SHIPPED | OUTPATIENT
Start: 2019-02-20 | End: 2019-03-21 | Stop reason: SDUPTHER

## 2019-02-20 RX ORDER — SODIUM CHLORIDE 9 MG/ML
INJECTION, SOLUTION INTRAVENOUS CONTINUOUS
Status: CANCELLED | OUTPATIENT
Start: 2019-02-20

## 2019-02-20 RX ORDER — SODIUM CHLORIDE 0.9 % (FLUSH) 0.9 %
10 SYRINGE (ML) INJECTION
Status: CANCELLED | OUTPATIENT
Start: 2019-02-20

## 2019-02-20 RX ORDER — NYSTATIN 100000 [USP'U]/G
POWDER TOPICAL 2 TIMES DAILY
Qty: 120 G | Refills: 3 | OUTPATIENT
Start: 2019-02-20

## 2019-02-20 RX ORDER — HEPARIN 100 UNIT/ML
5 SYRINGE INTRAVENOUS
Status: CANCELLED | OUTPATIENT
Start: 2019-02-20

## 2019-02-25 ENCOUNTER — PATIENT MESSAGE (OUTPATIENT)
Dept: FAMILY MEDICINE | Facility: CLINIC | Age: 69
End: 2019-02-25

## 2019-02-25 ENCOUNTER — OFFICE VISIT (OUTPATIENT)
Dept: ORTHOPEDICS | Facility: CLINIC | Age: 69
End: 2019-02-25
Payer: MEDICARE

## 2019-02-25 VITALS — WEIGHT: 281.06 LBS | HEIGHT: 68 IN | BODY MASS INDEX: 42.6 KG/M2

## 2019-02-25 DIAGNOSIS — M17.0 PRIMARY OSTEOARTHRITIS OF BOTH KNEES: Primary | ICD-10-CM

## 2019-02-25 PROCEDURE — 20610 DRAIN/INJ JOINT/BURSA W/O US: CPT | Mod: 50,S$GLB,, | Performed by: ORTHOPAEDIC SURGERY

## 2019-02-25 PROCEDURE — 1101F PR PT FALLS ASSESS DOC 0-1 FALLS W/OUT INJ PAST YR: ICD-10-PCS | Mod: CPTII,S$GLB,, | Performed by: ORTHOPAEDIC SURGERY

## 2019-02-25 PROCEDURE — 99999 PR PBB SHADOW E&M-EST. PATIENT-LVL II: CPT | Mod: PBBFAC,,, | Performed by: ORTHOPAEDIC SURGERY

## 2019-02-25 PROCEDURE — 99999 PR PBB SHADOW E&M-EST. PATIENT-LVL II: ICD-10-PCS | Mod: PBBFAC,,, | Performed by: ORTHOPAEDIC SURGERY

## 2019-02-25 PROCEDURE — 99213 PR OFFICE/OUTPT VISIT, EST, LEVL III, 20-29 MIN: ICD-10-PCS | Mod: 25,S$GLB,, | Performed by: ORTHOPAEDIC SURGERY

## 2019-02-25 PROCEDURE — 99213 OFFICE O/P EST LOW 20 MIN: CPT | Mod: 25,S$GLB,, | Performed by: ORTHOPAEDIC SURGERY

## 2019-02-25 PROCEDURE — 1101F PT FALLS ASSESS-DOCD LE1/YR: CPT | Mod: CPTII,S$GLB,, | Performed by: ORTHOPAEDIC SURGERY

## 2019-02-25 PROCEDURE — 20610 LARGE JOINT ASPIRATION/INJECTION: R KNEE, L KNEE: ICD-10-PCS | Mod: 50,S$GLB,, | Performed by: ORTHOPAEDIC SURGERY

## 2019-02-25 NOTE — PROCEDURES
Large Joint Aspiration/Injection: R knee, L knee  Date/Time: 2/25/2019 11:01 AM  Performed by: Mehran Carrasco MD  Authorized by: Mehran Carrasco MD     Consent Done?:  Yes (Verbal)  Indications:  Pain  Timeout: Prior to procedure the correct patient, procedure, and site was verified      Location:  Knee  Site:  R knee and L knee  Prep: Patient was prepped and draped in usual sterile fashion    Approach:  Anteromedial  Medications:  20 mg sodium hyaluronate (EUFLEXXA) 10 mg/mL(mw 2.4 -3.6 million); 20 mg sodium hyaluronate (EUFLEXXA) 10 mg/mL(mw 2.4 -3.6 million)

## 2019-02-25 NOTE — PROGRESS NOTES
Past Medical History:   Diagnosis Date    *Atrial flutter     Angina pectoris 2017    Anxiety     Arthritis     Asthma     Atrial fibrillation     Back pain     Cataract     OD    CHF (congestive heart failure)     COPD (chronic obstructive pulmonary disease)     Depression     Diabetes mellitus     Emphysema of lung     Heart failure     Hepatomegaly 2/3/2016    Hernia     History of MI (myocardial infarction) 2016    Hypercapnic respiratory failure, chronic 2016    Hyperlipidemia     Hypertension     Iron deficiency anemia 2/3/2016    Myocardial infarction     Obesity     Peripheral vascular disease     Pneumonia     Polyneuropathy     Retinal detachment     OS    Septic shock 2017    Skin ulcer 3/18/2017    Tobacco dependence     Type II 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, not stated as uncontrolled(250.60)        Past Surgical History:   Procedure Laterality Date    BREAST BIOPSY Left 10 yrs ago    benign    CATARACT EXTRACTION Left     OS      SECTION      x2    EYE SURGERY      HYSTERECTOMY      partial    OOPHORECTOMY      one ovary conserved    RETINAL DETACHMENT SURGERY      buckle --OS    TONSILLECTOMY         Current Outpatient Medications   Medication Sig    albuterol (ACCUNEB) 0.63 mg/3 mL Nebu Take 3 mLs (0.63 mg total) by nebulization every 6 (six) hours as needed. Rescue    albuterol (VENTOLIN HFA) 90 mcg/actuation inhaler Inhale 2 puffs into the lungs every 6 (six) hours as needed. Rescue    apixaban (ELIQUIS) 5 mg Tab Take 1 tablet (5 mg total) by mouth 2 (two) times daily.    ascorbic acid, vitamin C, (VITAMIN C) 500 MG tablet Take 1 tablet (500 mg total) by mouth every evening. (Patient taking differently: Take 1,000 mg by mouth 2 (two) times daily. )    atorvastatin (LIPITOR) 20 MG tablet Take 1 tablet (20 mg total) by mouth once daily.    BREO ELLIPTA 100-25 mcg/dose diskus inhaler  INHALE 1 PUFF INTO THE LUNGS ONCE DAILY.    budesonide (PULMICORT) 0.5 mg/2 mL nebulizer solution Take 2 mLs (0.5 mg total) by nebulization once daily. Controller    buPROPion (WELLBUTRIN) 100 MG tablet Take 1 tablet (100 mg total) by mouth 2 (two) times daily.    clonazePAM (KLONOPIN) 1 MG tablet     diclofenac sodium 1.5 % Drop     DULoxetine (CYMBALTA) 30 MG capsule     fluticasone (FLONASE) 50 mcg/actuation nasal spray 1 spray (50 mcg total) by Each Nare route once daily.    furosemide (LASIX) 40 MG tablet TAKE 1 TABLET ONE TIME DAILY  FOR  FLUID  OVERLOAD    gabapentin (NEURONTIN) 800 MG tablet Take 1 tablet (800 mg total) by mouth 3 (three) times daily.    guaiFENesin (MUCINEX) 600 mg 12 hr tablet Take 1 tablet (600 mg total) by mouth 2 (two) times daily.    HYDROcodone-acetaminophen (NORCO)  mg per tablet Take 1 tablet by mouth every 8 (eight) hours as needed for Pain (Do not take more than 3 a day. Do not mix with other narcotics.).    levalbuterol (XOPENEX) 0.63 mg/3 mL nebulizer solution INHALE THE CONTENTS OF 1 VIAL BY NEBULIZATION 4 times  DAILY.    DX: J43.9    lisinopril (PRINIVIL,ZESTRIL) 20 MG tablet Take 1 tablet (20 mg total) by mouth once daily.    loratadine (CLARITIN) 10 mg tablet Take 1 tablet (10 mg total) by mouth once daily.    metFORMIN (GLUCOPHAGE) 500 MG tablet TAKE 1 TABLET (500 MG TOTAL) BY MOUTH 2 (TWO) TIMES DAILY WITH MEALS.    metoprolol succinate (TOPROL-XL) 25 MG 24 hr tablet Take 0.5 tablets (12.5 mg total) by mouth once daily.    multivitamin (THERAGRAN) tablet Take 1 tablet by mouth once daily.    nystatin (NYSTOP) powder Apply topically 2 (two) times daily.    nystatin-triamcinolone (MYCOLOG II) cream Apply topically 3 (three) times daily.    omega-3 acid ethyl esters (LOVAZA) 1 gram capsule 2 (two) times daily.     omeprazole (PRILOSEC) 20 MG capsule Take 1 capsule (20 mg total) by mouth once daily.    ondansetron (ZOFRAN-ODT) 8 MG TbDL Take 1  tablet (8 mg total) by mouth every 6 (six) hours as needed.    polyethylene glycol (GLYCOLAX) 17 gram/dose powder     potassium chloride SA (KLOR-CON M20) 20 MEQ tablet Take 1 tablet (20 mEq total) by mouth once daily.    senna-docusate 8.6-50 mg (PERICOLACE) 8.6-50 mg per tablet Take 1 tablet by mouth 2 (two) times daily as needed for Constipation.    silver sulfADIAZINE 1% (SILVADENE) 1 % cream APPLY TOPICALLY ONCE DAILY.    spironolactone (ALDACTONE) 25 MG tablet Take 1 tablet (25 mg total) by mouth once daily.    traZODone (DESYREL) 100 MG tablet Take 1.5 tablets (150 mg total) by mouth every evening.    zaleplon (SONATA) 5 MG Cap      No current facility-administered medications for this visit.        Allergies   Allergen Reactions    Dilaudid [Hydromorphone] Anaphylaxis     Other reaction(s): Anaphylaxis  Other reaction(s): Unknown       Family History   Problem Relation Age of Onset    Arthritis Father     Heart disease Father     Early death Sister 30        heart disease    Heart disease Sister 32        MI    Cancer Brother         Lung cancer    No Known Problems Daughter     Blindness Son         due to accident//    Arthritis Sister     Heart disease Sister     Crohn's disease Sister     Alcohol abuse Mother     Breast cancer Neg Hx     Glaucoma Neg Hx     Macular degeneration Neg Hx     Retinal detachment Neg Hx     Amblyopia Neg Hx     Diabetes Neg Hx     Cataracts Neg Hx     Hypertension Neg Hx     Strabismus Neg Hx     Stroke Neg Hx     Thyroid disease Neg Hx        Social History     Socioeconomic History    Marital status:      Spouse name: Not on file    Number of children: Not on file    Years of education: Not on file    Highest education level: Not on file   Social Needs    Financial resource strain: Not on file    Food insecurity - worry: Not on file    Food insecurity - inability: Not on file    Transportation needs - medical: Not on file     Transportation needs - non-medical: Not on file   Occupational History    Not on file   Tobacco Use    Smoking status: Current Some Day Smoker     Packs/day: 0.25     Years: 50.00     Pack years: 12.50     Types: Cigarettes     Start date: 1/19/1968    Smokeless tobacco: Never Used   Substance and Sexual Activity    Alcohol use: No     Alcohol/week: 0.0 oz     Frequency: Never    Drug use: No    Sexual activity: Not Currently   Other Topics Concern    Not on file   Social History Narrative    Not on file       Chief Complaint:   Chief Complaint   Patient presents with    Injections     EUFLEXXA 1/3 BILATERAL KNEE       Consulting Physician: No ref. provider found    History of present illness: This is a 64-year-old young lady who reports the return of bilateral knee pain.  Pain today 5/10. No new injury. Previous injections helped.    Review of Systems:    Constitution: Denies chills, fever, and sweats.    HENT: Denies headaches. Reports blurry vision.    Cardiovascular: Denies chest pain or irregular heart beat.    Respiratory: Denies cough. Reports shortness of breath.    Gastrointestinal: Denies abdominal pain, nausea, or vomiting.    Musculoskeletal:  Denies muscle cramps.    Neurological: Denies dizziness or focal weakness.    Psychiatric/Behavioral: Normal mental status. Anxiety.    Hematologic/Lymphatic: Denies bleeding problem or easy bruising/bleeding.    Skin: Denies rash or suspicious lesions.      Examination:    Vital Signs:    There were no vitals filed for this visit.    Body mass index is 42.74 kg/m².    This a well-developed, well nourished patient in no acute distress.    Alert and oriented and cooperative to examination.       Physical Exam: Left Knee Exam    Gait   antalgic    Skin  Rash:   None  Scars:   None    Inspection  Erythema:  None  Ecchymosis:  None  Effusion:  Mild  Masses:  None  Lymphadenopathy: None    Range of Motion: Full - 0 to 130°    Medial Joint Line  Tender: Mild  Lateral Joint : Mild    Patellofemoral Tenderness: None  Patellofemoral Crepitus: None    Lachman:  Normal  Anterior Drawer: Normal  Posterior Drawer: Normal    Dilia's:  Negative  Apley's:  Negative    Varus Stress:  Stable  Valgus Stress: Stable    Strength:  5/5    Pulses:  Palpable distal    Sensation:  Intact      Right Knee Exam    Gait:   Antalgic    Skin  Rash:   None  Scars:   None    Inspection  Erythema:  None  Ecchymosis:  None  Effusion:  Mild  Masses:  None  Lymphadenopathy: None    Range of Motion: Full - 0 to 130°    Medial Joint : Mild  Lateral Joint : Mild    Patellofemoral Tenderness: None  Patellofemoral Crepitus: None    Lachman:  Normal  Anterior Drawer: Normal  Posterior Drawer: Normal    Dilia's:  Negative  Apley's:  Negative    Varus Stress:  Stable  Valgus Stress: Stable    Strength:  5/5    Pulses:  Palpable distal    Sensation:  Intact          Imaging: X-rays of both knees reveals some degenerative changes that are moderate.     Assessment: Primary osteoarthritis of both knees  -     Large Joint Aspiration/Injection: R knee, L knee        Plan:  Her knee arthritis is flaring up again. Her previous injections helped.  Euflexxa series    DISCLAIMER: This note may have been dictated using voice recognition software and may contain grammatical errors. Report sent to referring provider as required.

## 2019-02-27 RX ORDER — SODIUM CHLORIDE 0.9 % (FLUSH) 0.9 %
10 SYRINGE (ML) INJECTION
Status: CANCELLED | OUTPATIENT
Start: 2019-02-27

## 2019-02-27 RX ORDER — HEPARIN 100 UNIT/ML
5 SYRINGE INTRAVENOUS
Status: CANCELLED | OUTPATIENT
Start: 2019-02-27

## 2019-02-27 RX ORDER — SODIUM CHLORIDE 9 MG/ML
INJECTION, SOLUTION INTRAVENOUS CONTINUOUS
Status: CANCELLED | OUTPATIENT
Start: 2019-02-27

## 2019-03-06 ENCOUNTER — OFFICE VISIT (OUTPATIENT)
Dept: ORTHOPEDICS | Facility: CLINIC | Age: 69
End: 2019-03-06
Payer: MEDICARE

## 2019-03-06 ENCOUNTER — PATIENT MESSAGE (OUTPATIENT)
Dept: ORTHOPEDICS | Facility: CLINIC | Age: 69
End: 2019-03-06

## 2019-03-06 VITALS — HEIGHT: 68 IN | WEIGHT: 281.06 LBS | BODY MASS INDEX: 42.6 KG/M2

## 2019-03-06 DIAGNOSIS — M17.0 PRIMARY OSTEOARTHRITIS OF BOTH KNEES: Primary | ICD-10-CM

## 2019-03-06 PROCEDURE — 99999 PR PBB SHADOW E&M-EST. PATIENT-LVL II: CPT | Mod: PBBFAC,,, | Performed by: ORTHOPAEDIC SURGERY

## 2019-03-06 PROCEDURE — 20610 DRAIN/INJ JOINT/BURSA W/O US: CPT | Mod: 50,S$GLB,, | Performed by: ORTHOPAEDIC SURGERY

## 2019-03-06 PROCEDURE — 99499 UNLISTED E&M SERVICE: CPT | Mod: S$GLB,,, | Performed by: ORTHOPAEDIC SURGERY

## 2019-03-06 PROCEDURE — 20610 LARGE JOINT ASPIRATION/INJECTION: R KNEE, L KNEE: ICD-10-PCS | Mod: 50,S$GLB,, | Performed by: ORTHOPAEDIC SURGERY

## 2019-03-06 PROCEDURE — 99999 PR PBB SHADOW E&M-EST. PATIENT-LVL II: ICD-10-PCS | Mod: PBBFAC,,, | Performed by: ORTHOPAEDIC SURGERY

## 2019-03-06 PROCEDURE — 99499 NO LOS: ICD-10-PCS | Mod: S$GLB,,, | Performed by: ORTHOPAEDIC SURGERY

## 2019-03-12 ENCOUNTER — OFFICE VISIT (OUTPATIENT)
Dept: ORTHOPEDICS | Facility: CLINIC | Age: 69
End: 2019-03-12
Payer: MEDICARE

## 2019-03-12 VITALS — HEIGHT: 68 IN | BODY MASS INDEX: 42.59 KG/M2 | WEIGHT: 281 LBS

## 2019-03-12 DIAGNOSIS — M17.0 PRIMARY OSTEOARTHRITIS OF BOTH KNEES: Primary | ICD-10-CM

## 2019-03-12 PROCEDURE — 99999 PR PBB SHADOW E&M-EST. PATIENT-LVL II: ICD-10-PCS | Mod: PBBFAC,,, | Performed by: ORTHOPAEDIC SURGERY

## 2019-03-12 PROCEDURE — 99499 UNLISTED E&M SERVICE: CPT | Mod: S$GLB,,, | Performed by: ORTHOPAEDIC SURGERY

## 2019-03-12 PROCEDURE — 99999 PR PBB SHADOW E&M-EST. PATIENT-LVL II: CPT | Mod: PBBFAC,,, | Performed by: ORTHOPAEDIC SURGERY

## 2019-03-12 PROCEDURE — 99499 RISK ADDL DX/OHS AUDIT: ICD-10-PCS | Mod: S$GLB,,, | Performed by: ORTHOPAEDIC SURGERY

## 2019-03-12 PROCEDURE — 20610 DRAIN/INJ JOINT/BURSA W/O US: CPT | Mod: 50,S$GLB,, | Performed by: ORTHOPAEDIC SURGERY

## 2019-03-12 PROCEDURE — 20610 LARGE JOINT ASPIRATION/INJECTION: R KNEE, L KNEE: ICD-10-PCS | Mod: 50,S$GLB,, | Performed by: ORTHOPAEDIC SURGERY

## 2019-03-14 NOTE — PROCEDURES
Large Joint Aspiration/Injection: R knee, L knee  Date/Time: 3/6/2019 10:23 PM  Performed by: Mehran Carrasco MD  Authorized by: Mehran Carrasco MD     Consent Done?:  Yes (Verbal)  Indications:  Pain  Timeout: Prior to procedure the correct patient, procedure, and site was verified      Location:  Knee  Site:  R knee and L knee  Prep: Patient was prepped and draped in usual sterile fashion    Approach:  Anteromedial  Medications:  20 mg sodium hyaluronate (EUFLEXXA) 10 mg/mL(mw 2.4 -3.6 million); 20 mg sodium hyaluronate (EUFLEXXA) 10 mg/mL(mw 2.4 -3.6 million)

## 2019-03-14 NOTE — PROGRESS NOTES
Past Medical History:   Diagnosis Date    *Atrial flutter     Angina pectoris 2017    Anxiety     Arthritis     Asthma     Atrial fibrillation     Back pain     Cataract     OD    CHF (congestive heart failure)     COPD (chronic obstructive pulmonary disease)     Depression     Diabetes mellitus     Emphysema of lung     Heart failure     Hepatomegaly 2/3/2016    Hernia     History of MI (myocardial infarction) 2016    Hypercapnic respiratory failure, chronic 2016    Hyperlipidemia     Hypertension     Iron deficiency anemia 2/3/2016    Myocardial infarction     Obesity     Peripheral vascular disease     Pneumonia     Polyneuropathy     Retinal detachment     OS    Septic shock 2017    Skin ulcer 3/18/2017    Tobacco dependence     Type II 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, not stated as uncontrolled(250.60)        Past Surgical History:   Procedure Laterality Date    BREAST BIOPSY Left 10 yrs ago    benign    CATARACT EXTRACTION Left     OS      SECTION      x2    EYE SURGERY      HYSTERECTOMY      partial    OOPHORECTOMY      one ovary conserved    RETINAL DETACHMENT SURGERY      buckle --OS    TONSILLECTOMY         Current Outpatient Medications   Medication Sig    albuterol (ACCUNEB) 0.63 mg/3 mL Nebu Take 3 mLs (0.63 mg total) by nebulization every 6 (six) hours as needed. Rescue    albuterol (VENTOLIN HFA) 90 mcg/actuation inhaler Inhale 2 puffs into the lungs every 6 (six) hours as needed. Rescue    apixaban (ELIQUIS) 5 mg Tab Take 1 tablet (5 mg total) by mouth 2 (two) times daily.    ascorbic acid, vitamin C, (VITAMIN C) 500 MG tablet Take 1 tablet (500 mg total) by mouth every evening. (Patient taking differently: Take 1,000 mg by mouth 2 (two) times daily. )    atorvastatin (LIPITOR) 20 MG tablet Take 1 tablet (20 mg total) by mouth once daily.    BREO ELLIPTA 100-25 mcg/dose diskus inhaler  INHALE 1 PUFF INTO THE LUNGS ONCE DAILY.    budesonide (PULMICORT) 0.5 mg/2 mL nebulizer solution Take 2 mLs (0.5 mg total) by nebulization once daily. Controller    buPROPion (WELLBUTRIN) 100 MG tablet Take 1 tablet (100 mg total) by mouth 2 (two) times daily.    clonazePAM (KLONOPIN) 1 MG tablet     diclofenac sodium 1.5 % Drop     DULoxetine (CYMBALTA) 30 MG capsule     fluticasone (FLONASE) 50 mcg/actuation nasal spray 1 spray (50 mcg total) by Each Nare route once daily.    furosemide (LASIX) 40 MG tablet TAKE 1 TABLET ONE TIME DAILY  FOR  FLUID  OVERLOAD    gabapentin (NEURONTIN) 800 MG tablet Take 1 tablet (800 mg total) by mouth 3 (three) times daily.    guaiFENesin (MUCINEX) 600 mg 12 hr tablet Take 1 tablet (600 mg total) by mouth 2 (two) times daily.    HYDROcodone-acetaminophen (NORCO)  mg per tablet Take 1 tablet by mouth every 8 (eight) hours as needed for Pain (Do not take more than 3 a day. Do not mix with other narcotics.).    levalbuterol (XOPENEX) 0.63 mg/3 mL nebulizer solution INHALE THE CONTENTS OF 1 VIAL BY NEBULIZATION 4 times  DAILY.    DX: J43.9    lisinopril (PRINIVIL,ZESTRIL) 20 MG tablet Take 1 tablet (20 mg total) by mouth once daily.    loratadine (CLARITIN) 10 mg tablet Take 1 tablet (10 mg total) by mouth once daily.    metFORMIN (GLUCOPHAGE) 500 MG tablet TAKE 1 TABLET (500 MG TOTAL) BY MOUTH 2 (TWO) TIMES DAILY WITH MEALS.    metoprolol succinate (TOPROL-XL) 25 MG 24 hr tablet Take 0.5 tablets (12.5 mg total) by mouth once daily.    multivitamin (THERAGRAN) tablet Take 1 tablet by mouth once daily.    nystatin (NYSTOP) powder Apply topically 2 (two) times daily.    nystatin-triamcinolone (MYCOLOG II) cream Apply topically 3 (three) times daily.    omega-3 acid ethyl esters (LOVAZA) 1 gram capsule 2 (two) times daily.     omeprazole (PRILOSEC) 20 MG capsule Take 1 capsule (20 mg total) by mouth once daily.    ondansetron (ZOFRAN-ODT) 8 MG TbDL Take 1  tablet (8 mg total) by mouth every 6 (six) hours as needed.    polyethylene glycol (GLYCOLAX) 17 gram/dose powder     potassium chloride SA (KLOR-CON M20) 20 MEQ tablet Take 1 tablet (20 mEq total) by mouth once daily.    senna-docusate 8.6-50 mg (PERICOLACE) 8.6-50 mg per tablet Take 1 tablet by mouth 2 (two) times daily as needed for Constipation.    silver sulfADIAZINE 1% (SILVADENE) 1 % cream APPLY TOPICALLY ONCE DAILY.    spironolactone (ALDACTONE) 25 MG tablet Take 1 tablet (25 mg total) by mouth once daily.    traZODone (DESYREL) 100 MG tablet Take 1.5 tablets (150 mg total) by mouth every evening.    zaleplon (SONATA) 5 MG Cap      No current facility-administered medications for this visit.        Allergies   Allergen Reactions    Dilaudid [Hydromorphone] Anaphylaxis     Other reaction(s): Anaphylaxis  Other reaction(s): Unknown       Family History   Problem Relation Age of Onset    Arthritis Father     Heart disease Father     Early death Sister 30        heart disease    Heart disease Sister 32        MI    Cancer Brother         Lung cancer    No Known Problems Daughter     Blindness Son         due to accident//    Arthritis Sister     Heart disease Sister     Crohn's disease Sister     Alcohol abuse Mother     Breast cancer Neg Hx     Glaucoma Neg Hx     Macular degeneration Neg Hx     Retinal detachment Neg Hx     Amblyopia Neg Hx     Diabetes Neg Hx     Cataracts Neg Hx     Hypertension Neg Hx     Strabismus Neg Hx     Stroke Neg Hx     Thyroid disease Neg Hx        Social History     Socioeconomic History    Marital status:      Spouse name: Not on file    Number of children: Not on file    Years of education: Not on file    Highest education level: Not on file   Social Needs    Financial resource strain: Not on file    Food insecurity - worry: Not on file    Food insecurity - inability: Not on file    Transportation needs - medical: Not on file     Transportation needs - non-medical: Not on file   Occupational History    Not on file   Tobacco Use    Smoking status: Current Some Day Smoker     Packs/day: 0.25     Years: 50.00     Pack years: 12.50     Types: Cigarettes     Start date: 1/19/1968    Smokeless tobacco: Never Used   Substance and Sexual Activity    Alcohol use: No     Alcohol/week: 0.0 oz     Frequency: Never    Drug use: No    Sexual activity: Not Currently   Other Topics Concern    Not on file   Social History Narrative    Not on file       Chief Complaint:   Chief Complaint   Patient presents with    Injections     EUFLEXXA 2/3 BILATERAL KNEE       Consulting Physician: No ref. provider found    History of present illness: This is a 64-year-old young lady who reports the return of bilateral knee pain.  Pain today 5/10. No new injury. Previous injections helped.    Review of Systems:    Constitution: Denies chills, fever, and sweats.    HENT: Denies headaches. Reports blurry vision.    Cardiovascular: Denies chest pain or irregular heart beat.    Respiratory: Denies cough. Reports shortness of breath.    Gastrointestinal: Denies abdominal pain, nausea, or vomiting.    Musculoskeletal:  Denies muscle cramps.    Neurological: Denies dizziness or focal weakness.    Psychiatric/Behavioral: Normal mental status. Anxiety.    Hematologic/Lymphatic: Denies bleeding problem or easy bruising/bleeding.    Skin: Denies rash or suspicious lesions.      Examination:    Vital Signs:    There were no vitals filed for this visit.    Body mass index is 42.74 kg/m².    This a well-developed, well nourished patient in no acute distress.    Alert and oriented and cooperative to examination.       Physical Exam: Left Knee Exam    Gait   antalgic    Skin  Rash:   None  Scars:   None    Inspection  Erythema:  None  Ecchymosis:  None  Effusion:  Mild  Masses:  None  Lymphadenopathy: None    Range of Motion: Full - 0 to 130°    Medial Joint Line  Tender: Mild  Lateral Joint : Mild    Patellofemoral Tenderness: None  Patellofemoral Crepitus: None    Lachman:  Normal  Anterior Drawer: Normal  Posterior Drawer: Normal    Dilia's:  Negative  Apley's:  Negative    Varus Stress:  Stable  Valgus Stress: Stable    Strength:  5/5    Pulses:  Palpable distal    Sensation:  Intact      Right Knee Exam    Gait:   Antalgic    Skin  Rash:   None  Scars:   None    Inspection  Erythema:  None  Ecchymosis:  None  Effusion:  Mild  Masses:  None  Lymphadenopathy: None    Range of Motion: Full - 0 to 130°    Medial Joint : Mild  Lateral Joint : Mild    Patellofemoral Tenderness: None  Patellofemoral Crepitus: None    Lachman:  Normal  Anterior Drawer: Normal  Posterior Drawer: Normal    Dilia's:  Negative  Apley's:  Negative    Varus Stress:  Stable  Valgus Stress: Stable    Strength:  5/5    Pulses:  Palpable distal    Sensation:  Intact          Imaging: X-rays of both knees reveals some degenerative changes that are moderate.     Assessment: Primary osteoarthritis of both knees  -     Large Joint Aspiration/Injection: R knee, L knee        Plan:  Euflexxa series    DISCLAIMER: This note may have been dictated using voice recognition software and may contain grammatical errors. Report sent to referring provider as required.

## 2019-03-20 NOTE — PROCEDURES
Large Joint Aspiration/Injection: R knee, L knee  Date/Time: 3/12/2019 8:12 PM  Performed by: Mehran Carrasco MD  Authorized by: Mehran Carrasco MD     Consent Done?:  Yes (Verbal)  Indications:  Pain  Timeout: Prior to procedure the correct patient, procedure, and site was verified      Location:  Knee  Site:  R knee and L knee  Prep: Patient was prepped and draped in usual sterile fashion    Approach:  Anteromedial  Medications:  20 mg sodium hyaluronate (EUFLEXXA) 10 mg/mL(mw 2.4 -3.6 million); 20 mg sodium hyaluronate (EUFLEXXA) 10 mg/mL(mw 2.4 -3.6 million)

## 2019-03-20 NOTE — PROGRESS NOTES
Past Medical History:   Diagnosis Date    *Atrial flutter     Angina pectoris 2017    Anxiety     Arthritis     Asthma     Atrial fibrillation     Back pain     Cataract     OD    CHF (congestive heart failure)     COPD (chronic obstructive pulmonary disease)     Depression     Diabetes mellitus     Emphysema of lung     Heart failure     Hepatomegaly 2/3/2016    Hernia     History of MI (myocardial infarction) 2016    Hypercapnic respiratory failure, chronic 2016    Hyperlipidemia     Hypertension     Iron deficiency anemia 2/3/2016    Myocardial infarction     Obesity     Peripheral vascular disease     Pneumonia     Polyneuropathy     Retinal detachment     OS    Septic shock 2017    Skin ulcer 3/18/2017    Tobacco dependence     Type II 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, not stated as uncontrolled(250.60)        Past Surgical History:   Procedure Laterality Date    BREAST BIOPSY Left 10 yrs ago    benign    CATARACT EXTRACTION Left     OS      SECTION      x2    EYE SURGERY      HYSTERECTOMY      partial    OOPHORECTOMY      one ovary conserved    RETINAL DETACHMENT SURGERY      buckle --OS    TONSILLECTOMY         Current Outpatient Medications   Medication Sig    albuterol (ACCUNEB) 0.63 mg/3 mL Nebu Take 3 mLs (0.63 mg total) by nebulization every 6 (six) hours as needed. Rescue    albuterol (VENTOLIN HFA) 90 mcg/actuation inhaler Inhale 2 puffs into the lungs every 6 (six) hours as needed. Rescue    apixaban (ELIQUIS) 5 mg Tab Take 1 tablet (5 mg total) by mouth 2 (two) times daily.    ascorbic acid, vitamin C, (VITAMIN C) 500 MG tablet Take 1 tablet (500 mg total) by mouth every evening. (Patient taking differently: Take 1,000 mg by mouth 2 (two) times daily. )    atorvastatin (LIPITOR) 20 MG tablet Take 1 tablet (20 mg total) by mouth once daily.    BREO ELLIPTA 100-25 mcg/dose diskus inhaler  INHALE 1 PUFF INTO THE LUNGS ONCE DAILY.    budesonide (PULMICORT) 0.5 mg/2 mL nebulizer solution Take 2 mLs (0.5 mg total) by nebulization once daily. Controller    buPROPion (WELLBUTRIN) 100 MG tablet Take 1 tablet (100 mg total) by mouth 2 (two) times daily.    clonazePAM (KLONOPIN) 1 MG tablet     diclofenac sodium 1.5 % Drop     DULoxetine (CYMBALTA) 30 MG capsule     fluticasone (FLONASE) 50 mcg/actuation nasal spray 1 spray (50 mcg total) by Each Nare route once daily.    furosemide (LASIX) 40 MG tablet TAKE 1 TABLET ONE TIME DAILY  FOR  FLUID  OVERLOAD    gabapentin (NEURONTIN) 800 MG tablet Take 1 tablet (800 mg total) by mouth 3 (three) times daily.    guaiFENesin (MUCINEX) 600 mg 12 hr tablet Take 1 tablet (600 mg total) by mouth 2 (two) times daily.    HYDROcodone-acetaminophen (NORCO)  mg per tablet Take 1 tablet by mouth every 8 (eight) hours as needed for Pain (Do not take more than 3 a day. Do not mix with other narcotics.).    levalbuterol (XOPENEX) 0.63 mg/3 mL nebulizer solution INHALE THE CONTENTS OF 1 VIAL BY NEBULIZATION 4 times  DAILY.    DX: J43.9    lisinopril (PRINIVIL,ZESTRIL) 20 MG tablet Take 1 tablet (20 mg total) by mouth once daily.    loratadine (CLARITIN) 10 mg tablet Take 1 tablet (10 mg total) by mouth once daily.    metFORMIN (GLUCOPHAGE) 500 MG tablet TAKE 1 TABLET (500 MG TOTAL) BY MOUTH 2 (TWO) TIMES DAILY WITH MEALS.    metoprolol succinate (TOPROL-XL) 25 MG 24 hr tablet Take 0.5 tablets (12.5 mg total) by mouth once daily.    multivitamin (THERAGRAN) tablet Take 1 tablet by mouth once daily.    nystatin (NYSTOP) powder Apply topically 2 (two) times daily.    nystatin-triamcinolone (MYCOLOG II) cream Apply topically 3 (three) times daily.    omega-3 acid ethyl esters (LOVAZA) 1 gram capsule 2 (two) times daily.     omeprazole (PRILOSEC) 20 MG capsule Take 1 capsule (20 mg total) by mouth once daily.    ondansetron (ZOFRAN-ODT) 8 MG TbDL Take 1  tablet (8 mg total) by mouth every 6 (six) hours as needed.    polyethylene glycol (GLYCOLAX) 17 gram/dose powder     potassium chloride SA (KLOR-CON M20) 20 MEQ tablet Take 1 tablet (20 mEq total) by mouth once daily.    senna-docusate 8.6-50 mg (PERICOLACE) 8.6-50 mg per tablet Take 1 tablet by mouth 2 (two) times daily as needed for Constipation.    silver sulfADIAZINE 1% (SILVADENE) 1 % cream APPLY TOPICALLY ONCE DAILY.    spironolactone (ALDACTONE) 25 MG tablet Take 1 tablet (25 mg total) by mouth once daily.    traZODone (DESYREL) 100 MG tablet Take 1.5 tablets (150 mg total) by mouth every evening.    zaleplon (SONATA) 5 MG Cap      No current facility-administered medications for this visit.        Allergies   Allergen Reactions    Dilaudid [Hydromorphone] Anaphylaxis     Other reaction(s): Anaphylaxis  Other reaction(s): Unknown       Family History   Problem Relation Age of Onset    Arthritis Father     Heart disease Father     Early death Sister 30        heart disease    Heart disease Sister 32        MI    Cancer Brother         Lung cancer    No Known Problems Daughter     Blindness Son         due to accident//    Arthritis Sister     Heart disease Sister     Crohn's disease Sister     Alcohol abuse Mother     Breast cancer Neg Hx     Glaucoma Neg Hx     Macular degeneration Neg Hx     Retinal detachment Neg Hx     Amblyopia Neg Hx     Diabetes Neg Hx     Cataracts Neg Hx     Hypertension Neg Hx     Strabismus Neg Hx     Stroke Neg Hx     Thyroid disease Neg Hx        Social History     Socioeconomic History    Marital status:      Spouse name: Not on file    Number of children: Not on file    Years of education: Not on file    Highest education level: Not on file   Social Needs    Financial resource strain: Not on file    Food insecurity - worry: Not on file    Food insecurity - inability: Not on file    Transportation needs - medical: Not on file     Transportation needs - non-medical: Not on file   Occupational History    Not on file   Tobacco Use    Smoking status: Current Some Day Smoker     Packs/day: 0.25     Years: 50.00     Pack years: 12.50     Types: Cigarettes     Start date: 1/19/1968    Smokeless tobacco: Never Used   Substance and Sexual Activity    Alcohol use: No     Alcohol/week: 0.0 oz     Frequency: Never    Drug use: No    Sexual activity: Not Currently   Other Topics Concern    Not on file   Social History Narrative    Not on file       Chief Complaint:   Chief Complaint   Patient presents with    Knee Pain     bilateral Euflexxa 3/3       Consulting Physician: No ref. provider found    History of present illness: This is a 64-year-old young lady who reports the return of bilateral knee pain.  Pain today 5/10. No new injury. Previous injections helped.    Review of Systems:    Constitution: Denies chills, fever, and sweats.    HENT: Denies headaches. Reports blurry vision.    Cardiovascular: Denies chest pain or irregular heart beat.    Respiratory: Denies cough. Reports shortness of breath.    Gastrointestinal: Denies abdominal pain, nausea, or vomiting.    Musculoskeletal:  Denies muscle cramps.    Neurological: Denies dizziness or focal weakness.    Psychiatric/Behavioral: Normal mental status. Anxiety.    Hematologic/Lymphatic: Denies bleeding problem or easy bruising/bleeding.    Skin: Denies rash or suspicious lesions.      Examination:    Vital Signs:    There were no vitals filed for this visit.    Body mass index is 42.73 kg/m².    This a well-developed, well nourished patient in no acute distress.    Alert and oriented and cooperative to examination.       Physical Exam: Left Knee Exam    Gait   antalgic    Skin  Rash:   None  Scars:   None    Inspection  Erythema:  None  Ecchymosis:  None  Effusion:  Mild  Masses:  None  Lymphadenopathy: None    Range of Motion: Full - 0 to 130°    Medial Joint : Mild  Lateral  Joint : Mild    Patellofemoral Tenderness: None  Patellofemoral Crepitus: None    Lachman:  Normal  Anterior Drawer: Normal  Posterior Drawer: Normal    Dilia's:  Negative  Apley's:  Negative    Varus Stress:  Stable  Valgus Stress: Stable    Strength:  5/5    Pulses:  Palpable distal    Sensation:  Intact      Right Knee Exam    Gait:   Antalgic    Skin  Rash:   None  Scars:   None    Inspection  Erythema:  None  Ecchymosis:  None  Effusion:  Mild  Masses:  None  Lymphadenopathy: None    Range of Motion: Full - 0 to 130°    Medial Joint : Mild  Lateral Joint : Mild    Patellofemoral Tenderness: None  Patellofemoral Crepitus: None    Lachman:  Normal  Anterior Drawer: Normal  Posterior Drawer: Normal    Dilia's:  Negative  Apley's:  Negative    Varus Stress:  Stable  Valgus Stress: Stable    Strength:  5/5    Pulses:  Palpable distal    Sensation:  Intact          Imaging: X-rays of both knees reveals some degenerative changes that are moderate.     Assessment: Primary osteoarthritis of both knees  -     Large Joint Aspiration/Injection: R knee, L knee        Plan:  Euflexxa series    DISCLAIMER: This note may have been dictated using voice recognition software and may contain grammatical errors. Report sent to referring provider as required.

## 2019-03-21 DIAGNOSIS — M51.36 LUMBAR DEGENERATIVE DISC DISEASE: ICD-10-CM

## 2019-03-21 DIAGNOSIS — M43.12 SPONDYLOLISTHESIS OF CERVICAL REGION: ICD-10-CM

## 2019-03-21 RX ORDER — SILVER SULFADIAZINE 10 G/1000G
1 CREAM TOPICAL DAILY
Qty: 50 G | Refills: 3 | Status: CANCELLED | OUTPATIENT
Start: 2019-03-21

## 2019-03-21 NOTE — TELEPHONE ENCOUNTER
Patient requesting a refill of Hydrocodone.  LR--2/20/19  LOV--12/28/18 (Ramon)  FOV--12/2/19 (Pelon)  Urine Toxicology--12/6/18  Pain Contract--12/6/18  Checked [place] Prescription Monitoring Program site. No unusual or abnormal behavior noted.

## 2019-03-25 ENCOUNTER — LAB VISIT (OUTPATIENT)
Dept: LAB | Facility: HOSPITAL | Age: 69
End: 2019-03-25
Attending: INTERNAL MEDICINE
Payer: MEDICARE

## 2019-03-25 DIAGNOSIS — R79.9 ABNORMAL FINDING OF BLOOD CHEMISTRY: ICD-10-CM

## 2019-03-25 DIAGNOSIS — J40 BRONCHITIS: ICD-10-CM

## 2019-03-25 DIAGNOSIS — B99.9 RECURRENT INFECTIONS: ICD-10-CM

## 2019-03-25 LAB
ANISOCYTOSIS BLD QL SMEAR: SLIGHT
BASOPHILS # BLD AUTO: 0 K/UL (ref 0–0.2)
BASOPHILS NFR BLD: 0.3 % (ref 0–1.9)
DIFFERENTIAL METHOD: ABNORMAL
EOSINOPHIL # BLD AUTO: 0.3 K/UL (ref 0–0.5)
EOSINOPHIL NFR BLD: 4.6 % (ref 0–8)
ERYTHROCYTE [DISTWIDTH] IN BLOOD BY AUTOMATED COUNT: 22.8 % (ref 11.5–14.5)
HCT VFR BLD AUTO: 42 % (ref 37–48.5)
HGB BLD-MCNC: 13.3 G/DL (ref 12–16)
IRON SERPL-MCNC: 86 UG/DL (ref 30–160)
LYMPHOCYTES # BLD AUTO: 1.5 K/UL (ref 1–4.8)
LYMPHOCYTES NFR BLD: 20 % (ref 18–48)
MCH RBC QN AUTO: 28 PG (ref 27–31)
MCHC RBC AUTO-ENTMCNC: 31.7 G/DL (ref 32–36)
MCV RBC AUTO: 88 FL (ref 82–98)
MONOCYTES # BLD AUTO: 0.6 K/UL (ref 0.3–1)
MONOCYTES NFR BLD: 8.4 % (ref 4–15)
NEUTROPHILS # BLD AUTO: 4.9 K/UL (ref 1.8–7.7)
NEUTROPHILS NFR BLD: 66.7 % (ref 38–73)
PLATELET # BLD AUTO: 227 K/UL (ref 150–350)
PLATELET BLD QL SMEAR: ABNORMAL
PMV BLD AUTO: 8.7 FL (ref 9.2–12.9)
RBC # BLD AUTO: 4.75 M/UL (ref 4–5.4)
SATURATED IRON: 23 % (ref 20–50)
TOTAL IRON BINDING CAPACITY: 382 UG/DL (ref 250–450)
TRANSFERRIN SERPL-MCNC: 258 MG/DL (ref 200–375)
WBC # BLD AUTO: 7.3 K/UL (ref 3.9–12.7)

## 2019-03-25 PROCEDURE — 85025 COMPLETE CBC W/AUTO DIFF WBC: CPT

## 2019-03-25 PROCEDURE — 82787 IGG 1 2 3 OR 4 EACH: CPT

## 2019-03-25 PROCEDURE — 83540 ASSAY OF IRON: CPT

## 2019-03-25 PROCEDURE — 36415 COLL VENOUS BLD VENIPUNCTURE: CPT

## 2019-03-25 RX ORDER — HYDROCODONE BITARTRATE AND ACETAMINOPHEN 10; 325 MG/1; MG/1
1 TABLET ORAL EVERY 8 HOURS PRN
Qty: 90 TABLET | Refills: 0 | Status: SHIPPED | OUTPATIENT
Start: 2019-03-25 | End: 2019-04-25 | Stop reason: SDUPTHER

## 2019-03-26 NOTE — PROGRESS NOTES
9/4/2018  [] Called and reviewed disaster plan with patient/caregiver.  Provided emergency phone number for patient's [place].   [x] Attempted to reach patient/caregiver to review disaster plan.  Left a voice message with the phone number for patient's [place] Office of Emergency Preparedness.     Reviewed with Patient/Caregiver:  [] Patient to have a supply of water, non-perishable food items, flashlights and batteries  [] Patient to bring all medications if evacuates:  at least a five day supply preferably in the medication bottles, in case displaced for longer than 5 days  [] Patient to bring any DME needed (walkers, wheelchairs, shower chairs, nebulizer machine, etc.)   [] If Diabetic, patient to bring glucometer and monitoring supplies.   [] If Hypertension, patient to bring blood pressure cuff  [] If CHF, patient to bring scale  [] If tube feeds, patient to bring tube feedings and feeding supplies.  [] If on oxygen,  patient to bring all oxygen supplies (Cannula, portable tanks, concentrator).  Patient will contact oxygen supply company to find out the nearest location of a supply company near evacuated location. ([employer]: [phone], [facility]: [phone], [facility]:[phone], [employer]: [phone]), [facility] -[phone].  [] If on hemodialysis, patient should already have a plan in place with dialysis center. If patient does not know where to evacuate to in order to be close to a dialysis center, patient/caregiver to reach out to regular dialysis center for further direction. ([facility]  service line: [phone], [facility]  service line [phone])  [] If receiving treatment at an infusion center, patient/caregiver to contact the infusion center to find out which infusion center they have a contract with where patient plans to evacuate.      Office of Emergency Preparedness Phone number:  [] [facility] [place]: [phone]  [] [place]: [phone]  [x]  [place]: [phone]  [] [place]: [phone]  [] [place]: [phone]  [] [place]: [phone]  [] [place]: [phone]  [] [facility]: [phone]  [] [facility] [place]: [phone]  [] [place]: [phone]  [] [place]: [phone]  [] [place]: [phone]  [] [place]: [phone]    [] [facility]:  [phone]   [] [facility]:  [phone]   [] [facility]:  [phone]   [] [facility]:  [phone]   [] [facility]:  [phone]   [] [facility]: [phone]   [] [facility]:  [phone]   [] [facility]:  [phone]   [] [facility]:  [phone]   [] [facility]:  [phone]   [] [facility]:  [phone]   [] [facility]:  [phone]   [] [facility]:  [phone]   [] [facility]:  [phone]   [] [facility]:  [phone]   [] [facility]: [phone]   [] [facility]:  [phone]   [] [facility]: [phone]   [] [facility]: [phone]    JUANITA Zhu         Melolabial Transposition Flap Text: The defect edges were debeveled with a #15 scalpel blade.  Given the location of the defect and the proximity to free margins a melolabial flap was deemed most appropriate.  Using a sterile surgical marker, an appropriate melolabial transposition flap was drawn incorporating the defect.    The area thus outlined was incised deep to adipose tissue with a #15 scalpel blade.  The skin margins were undermined to an appropriate distance in all directions utilizing iris scissors.

## 2019-03-27 LAB
IGG1 SER-MCNC: 605 MG/DL (ref 382–929)
IGG2 SER-MCNC: 147 MG/DL (ref 242–700)
IGG3 SER-MCNC: 76 MG/DL (ref 22–176)
IGG4 SER-MCNC: 72 MG/DL (ref 4–86)

## 2019-03-27 NOTE — TELEPHONE ENCOUNTER
Received message from patient requesting refill of Oxycodone. Patient is currently admitted to hospital, Dr. Bird declined refill as a result. Patient notified. Verbalized understanding.   (cardiologist.

## 2019-03-28 ENCOUNTER — OFFICE VISIT (OUTPATIENT)
Dept: HEMATOLOGY/ONCOLOGY | Facility: CLINIC | Age: 69
End: 2019-03-28
Payer: MEDICARE

## 2019-03-28 VITALS
DIASTOLIC BLOOD PRESSURE: 64 MMHG | HEIGHT: 68 IN | BODY MASS INDEX: 40.89 KG/M2 | TEMPERATURE: 98 F | SYSTOLIC BLOOD PRESSURE: 129 MMHG | RESPIRATION RATE: 22 BRPM | OXYGEN SATURATION: 95 % | WEIGHT: 269.81 LBS | HEART RATE: 57 BPM

## 2019-03-28 DIAGNOSIS — E66.01 MORBID OBESITY WITH BMI OF 40.0-44.9, ADULT: Primary | ICD-10-CM

## 2019-03-28 DIAGNOSIS — Z79.01 ANTICOAGULATED: ICD-10-CM

## 2019-03-28 DIAGNOSIS — J32.9 SINUSITIS, UNSPECIFIED CHRONICITY, UNSPECIFIED LOCATION: ICD-10-CM

## 2019-03-28 DIAGNOSIS — M51.36 LUMBAR DEGENERATIVE DISC DISEASE: ICD-10-CM

## 2019-03-28 DIAGNOSIS — I48.0 PAROXYSMAL ATRIAL FIBRILLATION: ICD-10-CM

## 2019-03-28 DIAGNOSIS — Z86.2 HISTORY OF ANEMIA: ICD-10-CM

## 2019-03-28 DIAGNOSIS — Z86.2 HISTORY OF IRON DEFICIENCY ANEMIA: ICD-10-CM

## 2019-03-28 PROBLEM — J18.9 COMMUNITY ACQUIRED PNEUMONIA OF RIGHT LOWER LOBE OF LUNG: Status: RESOLVED | Noted: 2019-01-19 | Resolved: 2019-03-28

## 2019-03-28 PROCEDURE — 99214 PR OFFICE/OUTPT VISIT, EST, LEVL IV, 30-39 MIN: ICD-10-PCS | Mod: S$GLB,,, | Performed by: INTERNAL MEDICINE

## 2019-03-28 PROCEDURE — 99499 UNLISTED E&M SERVICE: CPT | Mod: S$GLB,,, | Performed by: INTERNAL MEDICINE

## 2019-03-28 PROCEDURE — 1101F PR PT FALLS ASSESS DOC 0-1 FALLS W/OUT INJ PAST YR: ICD-10-PCS | Mod: CPTII,S$GLB,, | Performed by: INTERNAL MEDICINE

## 2019-03-28 PROCEDURE — 99999 PR PBB SHADOW E&M-EST. PATIENT-LVL III: CPT | Mod: PBBFAC,,, | Performed by: INTERNAL MEDICINE

## 2019-03-28 PROCEDURE — 3288F FALL RISK ASSESSMENT DOCD: CPT | Mod: CPTII,S$GLB,, | Performed by: INTERNAL MEDICINE

## 2019-03-28 PROCEDURE — 3078F DIAST BP <80 MM HG: CPT | Mod: CPTII,S$GLB,, | Performed by: INTERNAL MEDICINE

## 2019-03-28 PROCEDURE — 3074F SYST BP LT 130 MM HG: CPT | Mod: CPTII,S$GLB,, | Performed by: INTERNAL MEDICINE

## 2019-03-28 PROCEDURE — 3288F PR FALLS RISK ASSESSMENT DOCUMENTED: ICD-10-PCS | Mod: CPTII,S$GLB,, | Performed by: INTERNAL MEDICINE

## 2019-03-28 PROCEDURE — 3078F PR MOST RECENT DIASTOLIC BLOOD PRESSURE < 80 MM HG: ICD-10-PCS | Mod: CPTII,S$GLB,, | Performed by: INTERNAL MEDICINE

## 2019-03-28 PROCEDURE — 3074F PR MOST RECENT SYSTOLIC BLOOD PRESSURE < 130 MM HG: ICD-10-PCS | Mod: CPTII,S$GLB,, | Performed by: INTERNAL MEDICINE

## 2019-03-28 PROCEDURE — 99499 RISK ADDL DX/OHS AUDIT: ICD-10-PCS | Mod: S$GLB,,, | Performed by: INTERNAL MEDICINE

## 2019-03-28 PROCEDURE — 1125F PR PAIN SEVERITY QUANTIFIED, PAIN PRESENT: ICD-10-PCS | Mod: S$GLB,,, | Performed by: INTERNAL MEDICINE

## 2019-03-28 PROCEDURE — 99999 PR PBB SHADOW E&M-EST. PATIENT-LVL III: ICD-10-PCS | Mod: PBBFAC,,, | Performed by: INTERNAL MEDICINE

## 2019-03-28 PROCEDURE — 1101F PT FALLS ASSESS-DOCD LE1/YR: CPT | Mod: CPTII,S$GLB,, | Performed by: INTERNAL MEDICINE

## 2019-03-28 PROCEDURE — 99214 OFFICE O/P EST MOD 30 MIN: CPT | Mod: S$GLB,,, | Performed by: INTERNAL MEDICINE

## 2019-03-28 PROCEDURE — 1125F AMNT PAIN NOTED PAIN PRSNT: CPT | Mod: S$GLB,,, | Performed by: INTERNAL MEDICINE

## 2019-03-28 RX ORDER — CLONAZEPAM 0.5 MG/1
0.5 TABLET ORAL 2 TIMES DAILY PRN
Qty: 60 TABLET | Refills: 0 | Status: SHIPPED | OUTPATIENT
Start: 2019-03-28 | End: 2019-04-25 | Stop reason: SDUPTHER

## 2019-03-28 NOTE — TELEPHONE ENCOUNTER
Klonopin has been refilled but she should be tapering her narcotics. I will wean her from too many narcotics cocktails such as benzodiazepines and opiates.

## 2019-03-28 NOTE — PROGRESS NOTES
CHIEF COMPLAINT: I am still congested     Ms. Tian is a 68 -year-old female who has a history of progressive anemia after  IV iron.    Here today for routine labs for anemia. Post IV iron   She is tolerating Glucophage for diabetes as well as Lexapro for depression and Zestril  for hypertension  Trazodone not helping her sleep  Pt with sinus congestion   Scared to take OTC medicine on eliquis     Past Medical History:   Diagnosis Date    *Atrial flutter     Angina pectoris 9/18/2017    Anxiety     Arthritis     Asthma     Atrial fibrillation     Back pain     Cataract     OD    CHF (congestive heart failure)     COPD (chronic obstructive pulmonary disease)     Depression     Diabetes mellitus     Emphysema of lung     Heart failure     Hepatomegaly 2/3/2016    Hernia     History of MI (myocardial infarction) 1/19/2016    Hypercapnic respiratory failure, chronic 11/16/2016    Hyperlipidemia     Hypertension     Iron deficiency anemia 2/3/2016    Myocardial infarction     Obesity     Peripheral vascular disease     Pneumonia     Polyneuropathy     Retinal detachment     OS    Septic shock 4/23/2017    Skin ulcer 3/18/2017    Tobacco dependence     Type II 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, not stated as uncontrolled(250.60)          Current Outpatient Medications:     albuterol (ACCUNEB) 0.63 mg/3 mL Nebu, Take 3 mLs (0.63 mg total) by nebulization every 6 (six) hours as needed. Rescue, Disp: 75 mL, Rfl: 11    albuterol (VENTOLIN HFA) 90 mcg/actuation inhaler, Inhale 2 puffs into the lungs every 6 (six) hours as needed. Rescue, Disp: 3 Inhaler, Rfl: 3    apixaban (ELIQUIS) 5 mg Tab, Take 1 tablet (5 mg total) by mouth 2 (two) times daily., Disp: 180 tablet, Rfl: 3    ascorbic acid, vitamin C, (VITAMIN C) 500 MG tablet, Take 1 tablet (500 mg total) by mouth every evening. (Patient taking differently: Take 1,000 mg by mouth 2 (two) times daily.  ), Disp: , Rfl:     atorvastatin (LIPITOR) 20 MG tablet, Take 1 tablet (20 mg total) by mouth once daily., Disp: 90 tablet, Rfl: 3    BREO ELLIPTA 100-25 mcg/dose diskus inhaler, INHALE 1 PUFF INTO THE LUNGS ONCE DAILY., Disp: 60 each, Rfl: 2    budesonide (PULMICORT) 0.5 mg/2 mL nebulizer solution, Take 2 mLs (0.5 mg total) by nebulization once daily. Controller, Disp: 180 mL, Rfl: 4    buPROPion (WELLBUTRIN) 100 MG tablet, Take 1 tablet (100 mg total) by mouth 2 (two) times daily., Disp: 60 tablet, Rfl: 11    clonazePAM (KLONOPIN) 1 MG tablet, , Disp: , Rfl: 2    diclofenac sodium 1.5 % Drop, , Disp: , Rfl:     DULoxetine (CYMBALTA) 30 MG capsule, , Disp: , Rfl:     fluticasone (FLONASE) 50 mcg/actuation nasal spray, 1 spray (50 mcg total) by Each Nare route once daily., Disp: 1 Bottle, Rfl: 2    furosemide (LASIX) 40 MG tablet, TAKE 1 TABLET ONE TIME DAILY  FOR  FLUID  OVERLOAD, Disp: 90 tablet, Rfl: 3    gabapentin (NEURONTIN) 800 MG tablet, Take 1 tablet (800 mg total) by mouth 3 (three) times daily., Disp: 270 tablet, Rfl: 3    guaiFENesin (MUCINEX) 600 mg 12 hr tablet, Take 1 tablet (600 mg total) by mouth 2 (two) times daily., Disp: , Rfl:     HYDROcodone-acetaminophen (NORCO)  mg per tablet, Take 1 tablet by mouth every 8 (eight) hours as needed for Pain (Do not take more than 3 a day. Do not mix with other narcotics.)., Disp: 90 tablet, Rfl: 0    levalbuterol (XOPENEX) 0.63 mg/3 mL nebulizer solution, INHALE THE CONTENTS OF 1 VIAL BY NEBULIZATION 4 times  DAILY.    DX: J43.9, Disp: 792 mL, Rfl: 3    lisinopril (PRINIVIL,ZESTRIL) 20 MG tablet, Take 1 tablet (20 mg total) by mouth once daily., Disp: 90 tablet, Rfl: 1    loratadine (CLARITIN) 10 mg tablet, Take 1 tablet (10 mg total) by mouth once daily., Disp: , Rfl: 0    metFORMIN (GLUCOPHAGE) 500 MG tablet, TAKE 1 TABLET (500 MG TOTAL) BY MOUTH 2 (TWO) TIMES DAILY WITH MEALS., Disp: 180 tablet, Rfl: 3    metoprolol succinate  (TOPROL-XL) 25 MG 24 hr tablet, Take 0.5 tablets (12.5 mg total) by mouth once daily., Disp: 45 tablet, Rfl: 3    multivitamin (THERAGRAN) tablet, Take 1 tablet by mouth once daily., Disp: , Rfl:     nystatin (NYSTOP) powder, Apply topically 2 (two) times daily., Disp: 120 g, Rfl: 3    nystatin-triamcinolone (MYCOLOG II) cream, Apply topically 3 (three) times daily., Disp: 30 g, Rfl: 1    omega-3 acid ethyl esters (LOVAZA) 1 gram capsule, 2 (two) times daily. , Disp: , Rfl:     omeprazole (PRILOSEC) 20 MG capsule, Take 1 capsule (20 mg total) by mouth once daily., Disp: 30 capsule, Rfl: 11    ondansetron (ZOFRAN-ODT) 8 MG TbDL, Take 1 tablet (8 mg total) by mouth every 6 (six) hours as needed., Disp: 20 tablet, Rfl: 3    polyethylene glycol (GLYCOLAX) 17 gram/dose powder, , Disp: , Rfl:     potassium chloride SA (KLOR-CON M20) 20 MEQ tablet, Take 1 tablet (20 mEq total) by mouth once daily., Disp: 30 tablet, Rfl: 6    senna-docusate 8.6-50 mg (PERICOLACE) 8.6-50 mg per tablet, Take 1 tablet by mouth 2 (two) times daily as needed for Constipation., Disp: , Rfl:     silver sulfADIAZINE 1% (SILVADENE) 1 % cream, APPLY TOPICALLY ONCE DAILY., Disp: 50 g, Rfl: 3    spironolactone (ALDACTONE) 25 MG tablet, Take 1 tablet (25 mg total) by mouth once daily., Disp: 30 tablet, Rfl: 11    traZODone (DESYREL) 100 MG tablet, Take 1.5 tablets (150 mg total) by mouth every evening., Disp: 90 tablet, Rfl: 2    zaleplon (SONATA) 5 MG Cap, , Disp: , Rfl: 3    Tolerating zofran for nausea and glucophage for DM     REVIEW OF SYSTEMS: extreme exhaustion  GENERAL:  No progression of fatigue nor SOB  She is obese.  Difficulty  walking,  Knee pain 5/10 managed by Dr Pelon CORTES:  No photophobia, ++ rhinorrhea   RESPIRATORY:  No further cough positive congestion CONTINUES no colored discharge  No wheezing   CARDIOVASCULAR no   chest pain, NO  palpitations  GASTROINTESTINAL:  No abdominal pain, dysphagia, emesis, diarrhea,  "or  constipation.    GENITOURINARY:  No urinary frequency, hesitancy  RHEUM:  no arthralgias or joint swelling.  MUSCOLSKELETAL:  No neck pain, back pain, positive  arthralgias  NEUROLOGICAL:  No headaches, positive dizziness, + paresthesias  PSYCH:  No agitation, change in behavior, or anxiety.  ENDOCRINE:  No hot or cold intolerance.      PHYSICAL EXAMINATION:  /64   Pulse (!) 57   Temp 98.4 °F (36.9 °C)   Resp (!) 22   Ht 5' 8" (1.727 m)   Wt 122.4 kg (269 lb 13.5 oz)   LMP  (LMP Unknown)   SpO2 95%   BMI 41.03 kg/m²     GENERAL:  She is obese.  She is oriented, appears ill   PSYCH:  Flat affect.  No anxiety or depression.  HEENT:  Normocephalic.  Lids intact, conjunctivae pale     OP clear,  ++  palatal pallor.  NECK:  Supple.  Trachea midline.  No palpable abnormalities.  CHEST:+ coarse BS, + fremitus  Decreased BS right base    CV S1S2 with RRR  ABDOMEN:  NT, ND.  Normal bowel sounds.  No palpable HSM or mass.  EXTREMITIES:  Legs,  2 +  pitting edema.bilaterally   NEUROLOGICAL:  Patient is ambulating well with walker    SKIN:  Warm, dry.  Ecchymosis evident.  No tenting, petechiae    To DR Carrasco for injections on knee  Zan Bradshaw MD 1/19/2019       Narrative     EXAMINATION:  XR CHEST PA AND LATERAL    CLINICAL HISTORY:  Cough;    TECHNIQUE:  PA and lateral views of the chest were performed.    COMPARISON:  Chest x-ray dated 11/09/2018    FINDINGS:  There is left basilar atelectasis or scar but there is a developing infiltrate or atelectasis in the right lung base which was not present previously.  There is no pneumothorax or evidence of congestive failure.      Impression       1. Developing infiltrate or atelectasis in the right lower lobe with minimal left basilar atelectasis or scar.  This report was flagged in Epic as abnormal.      Electronically signed by: Zan Bradshaw MD  Date: 01/19/2019  Time: 16:20       LABS:    reviewed  Ref Range & Ehlom3h ago  3wk ago  WBC3.90 - " 12.70 K/uL7.90 10.30 RBC4.00 - 5.40 M/uL4.27 4.23 Xxaxpjboko42.0 - 16.0 g/dL11.0 Abnormally low  11.3 Abnormally low  Xpvzwgpsim90.0 - 48.5 %35.8 Abnormally low  35.6 Abnormally low  MCV82 - 98 fL84 84 MCH27.0 - 31.0 pg25.7 Abnormally low  26.8 Abnormally low  MCHC32.0 - 36.0 g/dL30.7 Abnormally low  31.8 Abnormally low  RDW11.5 - 14.5 %17.7 Abnormally high  16.3 Abnormally high  Idyytmzuo528 - 350 K/uL239 216 MPV9.2 - 12.9 fL9.1 Abnormally low     Ref Range & Sytvv9v ago  2mo ago  Iron30 - 160 ug/dL27 Abnormally low  33 Vcirdanjqmn841 - 375 mg/dL294 301 QZWO256 - 450 ug/dL435 445 Saturated Iron20 - 50 %6 Abnormally low  7 Abnormally low     Lab Results   Component Value Date    WBC 7.30 03/25/2019    HGB 13.3 03/25/2019    HCT 42.0 03/25/2019    MCV 88 03/25/2019     03/25/2019     Ref Range & Woqyk2b ago  1mo ago  WBC3.90 - 12.70 K/uL7.30 7.90 RBC4.00 - 5.40 M/uL4.75 4.27 Wssadoczbe98.0 - 16.0 g/dL13.3 11.0Low  Gihktyptkn23.0 - 48.5 %42.0 35.8Low  MCV82 - 98 fL88 84 MCH27.0 - 31.0 pg28.0 25.7Low  MCHC32.0 - 36.0 g/dL31.7Low  30.7Low  RDW11.5 - 14.5 %22.8High  17.7High  Zwwtqzknl599 - 350 K/uL227 239 MPV9.2 - 12.9 fL8.7Low  9.1Low  Gran # (ANC)1.8 - 7.7 K/uL4.9 5.9 Lymph #1.0 - 4.8 K/uL1.5 1.1 Mono #0.3 - 1.0 K/uL0.6 0.6 Eos #0.0 - 0.5 K/uL0.3 0.2 Baso #0.00 - 0.20 K/uL0.00 0.00 Gran%38.0 - 73.0 %66.7 75.0High  Lymph%18.0 - 48.0 %20.0 14.4Low  Mono%4.0 - 15.0 %8.4 7.8   IGG 1, 2, 3, AND 4   Order: [id number]   Status:  Final result   Visible to patient:  Yes (Patient Portal) Next appt:  12/02/2019 at 11:00 AM in Family Medicine (Constantine Bird MD) Dx:  Bronchitis; Recurrent infections    Ref Range & Units 3d ago   IgG 1 382 - 929 mg/dL 605    IgG 2 242 - 700 mg/dL 147Low     IgG 3 22 - 176 mg/dL 76    IgG 4 4 - 86 mg/dL 72    Comment: Test performed at [facility]               IMPRESSION AND PLAN:        Morbid obesity with BMI of 40.0-44.9, adult    History of anemia    History of iron  deficiency anemia  -     CBC auto differential; Future; Expected date: 03/28/2019  -     CMP; Future; Expected date: 03/28/2019  -     Iron and TIBC; Future; Expected date: 03/28/2019    Sinusitis, unspecified chronicity, unspecified location    Paroxysmal atrial fibrillation    Anticoagulated         'good response to IV iron   Anemia normalized  Again BMI too high : she has lost weight and is working on further reduction  Focus on decreasing weight to decrease her risk of a thrombotic event  She is high risk of thromboses despite blood thinners  due to sedentary lifestyle and obesity    RTC 6 weeks with labs   Add mucinex D bid  rtc if symptoms worsen she may need ivig explained benefits and rationale   Reviewed meds and chart   Pain managed by Dr Bird   Cont lipitor to prevent plaques  Cont lasix to help with edema  She is to continue eliquis for chronic atrial fibrillation as well as her diabetic medication to help control hyperglycemia due to diabetes

## 2019-04-02 RX ORDER — NYSTATIN AND TRIAMCINOLONE ACETONIDE 100000; 1 [USP'U]/G; MG/G
CREAM TOPICAL
Qty: 90 G | Refills: 1 | Status: SHIPPED | OUTPATIENT
Start: 2019-04-02 | End: 2019-04-22

## 2019-04-04 DIAGNOSIS — R09.81 CONGESTION OF NASAL SINUS: ICD-10-CM

## 2019-04-04 RX ORDER — FLUTICASONE PROPIONATE 50 MCG
1 SPRAY, SUSPENSION (ML) NASAL DAILY
Qty: 1 BOTTLE | Refills: 2 | Status: SHIPPED | OUTPATIENT
Start: 2019-04-04 | End: 2019-12-04 | Stop reason: SDUPTHER

## 2019-04-11 ENCOUNTER — TELEPHONE (OUTPATIENT)
Dept: FAMILY MEDICINE | Facility: CLINIC | Age: 69
End: 2019-04-11

## 2019-04-11 NOTE — TELEPHONE ENCOUNTER
Please see attached message. Spoke to Sandy with [employer] who states patient is requesting additional home health visits. Peer-to peer is required from PCP for insurance coverage. Please advise.

## 2019-04-11 NOTE — TELEPHONE ENCOUNTER
Ms Tian has a abdominal ulcer stage III. There is a note from  in February but the note from march is lacking documentation regarding wound.Also lacking regarding her need for pulmonary therapy and activity level limitations.  Therefore I agree with Camryn. She needs an appointment since neither hematology nor Orthopedics has further skilled nurse instructions.

## 2019-04-11 NOTE — TELEPHONE ENCOUNTER
----- Message from Maria Teresa Rhaman sent at 4/11/2019 11:31 AM CDT -----  Contact: Yasemin/michael  Type: Needs Medical Advice    Who Called:  nya  Symptoms (please be specific):  na  How long has patient had these symptoms:  jose  Pharmacy name and phone #:  jose  Best Call Back Number: [phone]  Additional Information: Yasemin states she is trying to set up a peer to peer with the office. Please call before close of business on Friday to advise. Thanks!

## 2019-04-18 DIAGNOSIS — J41.8 MIXED SIMPLE AND MUCOPURULENT CHRONIC BRONCHITIS: ICD-10-CM

## 2019-04-18 RX ORDER — ALBUTEROL SULFATE 90 UG/1
AEROSOL, METERED RESPIRATORY (INHALATION)
Qty: 54 G | Refills: 3 | Status: SHIPPED | OUTPATIENT
Start: 2019-04-18 | End: 2020-03-18

## 2019-04-18 RX ORDER — FLUTICASONE FUROATE AND VILANTEROL TRIFENATATE 100; 25 UG/1; UG/1
POWDER RESPIRATORY (INHALATION)
Qty: 60 EACH | Refills: 2 | Status: SHIPPED | OUTPATIENT
Start: 2019-04-18 | End: 2019-07-22 | Stop reason: SDUPTHER

## 2019-04-22 DIAGNOSIS — F43.20 ADULT SITUATIONAL STRESS DISORDER: ICD-10-CM

## 2019-04-22 DIAGNOSIS — J43.9 PULMONARY EMPHYSEMA, UNSPECIFIED EMPHYSEMA TYPE: ICD-10-CM

## 2019-04-22 RX ORDER — NYSTATIN AND TRIAMCINOLONE ACETONIDE 100000; 1 [USP'U]/G; MG/G
CREAM TOPICAL
Qty: 90 G | Refills: 1 | Status: SHIPPED | OUTPATIENT
Start: 2019-04-22 | End: 2019-04-25 | Stop reason: SDUPTHER

## 2019-04-22 RX ORDER — NYSTATIN 100000 [USP'U]/G
POWDER TOPICAL 2 TIMES DAILY
Qty: 120 G | Refills: 1 | Status: SHIPPED | OUTPATIENT
Start: 2019-04-22 | End: 2019-07-24 | Stop reason: SDUPTHER

## 2019-04-22 RX ORDER — LEVALBUTEROL INHALATION SOLUTION 0.63 MG/3ML
SOLUTION RESPIRATORY (INHALATION)
Qty: 792 ML | Refills: 3 | Status: SHIPPED | OUTPATIENT
Start: 2019-04-22 | End: 2019-11-06 | Stop reason: SDUPTHER

## 2019-04-22 RX ORDER — BUDESONIDE 0.5 MG/2ML
0.5 INHALANT ORAL DAILY
Qty: 180 ML | Refills: 4 | Status: SHIPPED | OUTPATIENT
Start: 2019-04-22 | End: 2019-04-30 | Stop reason: SDUPTHER

## 2019-04-22 RX ORDER — TRAZODONE HYDROCHLORIDE 100 MG/1
150 TABLET ORAL NIGHTLY
Qty: 90 TABLET | Refills: 1 | Status: SHIPPED | OUTPATIENT
Start: 2019-04-22 | End: 2019-10-02 | Stop reason: SDUPTHER

## 2019-04-22 RX ORDER — NYSTATIN AND TRIAMCINOLONE ACETONIDE 100000; 1 [USP'U]/G; MG/G
CREAM TOPICAL 3 TIMES DAILY
Qty: 30 G | Refills: 1 | Status: SHIPPED | OUTPATIENT
Start: 2019-04-22 | End: 2020-06-28 | Stop reason: SDUPTHER

## 2019-04-25 ENCOUNTER — OFFICE VISIT (OUTPATIENT)
Dept: FAMILY MEDICINE | Facility: CLINIC | Age: 69
End: 2019-04-25
Payer: MEDICARE

## 2019-04-25 ENCOUNTER — TELEPHONE (OUTPATIENT)
Dept: FAMILY MEDICINE | Facility: CLINIC | Age: 69
End: 2019-04-25

## 2019-04-25 VITALS — SYSTOLIC BLOOD PRESSURE: 118 MMHG | TEMPERATURE: 98 F | HEART RATE: 71 BPM | DIASTOLIC BLOOD PRESSURE: 56 MMHG

## 2019-04-25 DIAGNOSIS — F33.9 MAJOR DEPRESSION, RECURRENT, CHRONIC: ICD-10-CM

## 2019-04-25 DIAGNOSIS — M43.12 SPONDYLOLISTHESIS OF CERVICAL REGION: ICD-10-CM

## 2019-04-25 DIAGNOSIS — D69.2 OTHER NONTHROMBOCYTOPENIC PURPURA: ICD-10-CM

## 2019-04-25 DIAGNOSIS — M51.36 LUMBAR DEGENERATIVE DISC DISEASE: ICD-10-CM

## 2019-04-25 DIAGNOSIS — F19.20 DEPENDENCY ON PAIN MEDICATION: ICD-10-CM

## 2019-04-25 DIAGNOSIS — E11.3293 CONTROLLED TYPE 2 DIABETES MELLITUS WITH BOTH EYES AFFECTED BY MILD NONPROLIFERATIVE RETINOPATHY WITHOUT MACULAR EDEMA, WITHOUT LONG-TERM CURRENT USE OF INSULIN: ICD-10-CM

## 2019-04-25 DIAGNOSIS — E11.59 HYPERTENSION ASSOCIATED WITH DIABETES: ICD-10-CM

## 2019-04-25 DIAGNOSIS — M51.36 DDD (DEGENERATIVE DISC DISEASE), LUMBAR: Primary | Chronic | ICD-10-CM

## 2019-04-25 DIAGNOSIS — E66.01 MORBID OBESITY WITH BMI OF 40.0-44.9, ADULT: ICD-10-CM

## 2019-04-25 DIAGNOSIS — I50.9 CONGESTIVE HEART FAILURE, UNSPECIFIED HF CHRONICITY, UNSPECIFIED HEART FAILURE TYPE: ICD-10-CM

## 2019-04-25 DIAGNOSIS — I15.2 HYPERTENSION ASSOCIATED WITH DIABETES: ICD-10-CM

## 2019-04-25 DIAGNOSIS — F17.210 CIGARETTE NICOTINE DEPENDENCE WITHOUT COMPLICATION: ICD-10-CM

## 2019-04-25 DIAGNOSIS — R21 RASH: ICD-10-CM

## 2019-04-25 DIAGNOSIS — J42 CHRONIC BRONCHITIS, UNSPECIFIED CHRONIC BRONCHITIS TYPE: ICD-10-CM

## 2019-04-25 DIAGNOSIS — F11.20 OPIATE DEPENDENCE, CONTINUOUS: Primary | ICD-10-CM

## 2019-04-25 DIAGNOSIS — I50.9 CONGESTIVE HEART FAILURE, UNSPECIFIED HF CHRONICITY, UNSPECIFIED HEART FAILURE TYPE: Primary | ICD-10-CM

## 2019-04-25 PROBLEM — A41.9 SEVERE SEPSIS: Status: RESOLVED | Noted: 2019-01-20 | Resolved: 2019-04-25

## 2019-04-25 PROBLEM — R65.20 SEVERE SEPSIS: Status: RESOLVED | Noted: 2019-01-20 | Resolved: 2019-04-25

## 2019-04-25 PROBLEM — H43.12 VITREOUS HEMORRHAGE, LEFT: Status: RESOLVED | Noted: 2018-09-10 | Resolved: 2019-04-25

## 2019-04-25 PROBLEM — I20.9 ANGINA PECTORIS: Status: RESOLVED | Noted: 2017-09-18 | Resolved: 2019-04-25

## 2019-04-25 PROCEDURE — 3044F HG A1C LEVEL LT 7.0%: CPT | Mod: CPTII,S$GLB,, | Performed by: NURSE PRACTITIONER

## 2019-04-25 PROCEDURE — 3078F DIAST BP <80 MM HG: CPT | Mod: CPTII,S$GLB,, | Performed by: NURSE PRACTITIONER

## 2019-04-25 PROCEDURE — 99999 PR PBB SHADOW E&M-EST. PATIENT-LVL III: CPT | Mod: PBBFAC,,, | Performed by: NURSE PRACTITIONER

## 2019-04-25 PROCEDURE — 1101F PR PT FALLS ASSESS DOC 0-1 FALLS W/OUT INJ PAST YR: ICD-10-PCS | Mod: CPTII,S$GLB,, | Performed by: NURSE PRACTITIONER

## 2019-04-25 PROCEDURE — 1101F PT FALLS ASSESS-DOCD LE1/YR: CPT | Mod: CPTII,S$GLB,, | Performed by: NURSE PRACTITIONER

## 2019-04-25 PROCEDURE — 99999 PR PBB SHADOW E&M-EST. PATIENT-LVL III: ICD-10-PCS | Mod: PBBFAC,,, | Performed by: NURSE PRACTITIONER

## 2019-04-25 PROCEDURE — 99214 OFFICE O/P EST MOD 30 MIN: CPT | Mod: S$GLB,,, | Performed by: NURSE PRACTITIONER

## 2019-04-25 PROCEDURE — 3074F PR MOST RECENT SYSTOLIC BLOOD PRESSURE < 130 MM HG: ICD-10-PCS | Mod: CPTII,S$GLB,, | Performed by: NURSE PRACTITIONER

## 2019-04-25 PROCEDURE — 3044F PR MOST RECENT HEMOGLOBIN A1C LEVEL <7.0%: ICD-10-PCS | Mod: CPTII,S$GLB,, | Performed by: NURSE PRACTITIONER

## 2019-04-25 PROCEDURE — 3074F SYST BP LT 130 MM HG: CPT | Mod: CPTII,S$GLB,, | Performed by: NURSE PRACTITIONER

## 2019-04-25 PROCEDURE — 3078F PR MOST RECENT DIASTOLIC BLOOD PRESSURE < 80 MM HG: ICD-10-PCS | Mod: CPTII,S$GLB,, | Performed by: NURSE PRACTITIONER

## 2019-04-25 PROCEDURE — 99214 PR OFFICE/OUTPT VISIT, EST, LEVL IV, 30-39 MIN: ICD-10-PCS | Mod: S$GLB,,, | Performed by: NURSE PRACTITIONER

## 2019-04-25 RX ORDER — NALOXONE HYDROCHLORIDE 4 MG/.1ML
1 SPRAY NASAL ONCE
Qty: 1 EACH | Refills: 0 | Status: CANCELLED | OUTPATIENT
Start: 2019-04-25 | End: 2019-04-25

## 2019-04-25 RX ORDER — HYDROCODONE BITARTRATE AND ACETAMINOPHEN 10; 325 MG/1; MG/1
1 TABLET ORAL EVERY 8 HOURS PRN
Qty: 90 TABLET | Refills: 0 | Status: CANCELLED | OUTPATIENT
Start: 2019-04-25

## 2019-04-25 RX ORDER — DM/P-EPHED/ACETAMINOPH/DOXYLAM 30-7.5/3
2 LIQUID (ML) ORAL
Refills: 0 | COMMUNITY
Start: 2019-04-25 | End: 2019-05-27

## 2019-04-25 RX ORDER — IBUPROFEN 200 MG
1 TABLET ORAL DAILY
Qty: 14 PATCH | Refills: 3 | Status: SHIPPED | OUTPATIENT
Start: 2019-06-06 | End: 2019-05-27

## 2019-04-25 RX ORDER — CLONAZEPAM 0.5 MG/1
0.5 TABLET ORAL 2 TIMES DAILY PRN
Qty: 60 TABLET | Refills: 0 | Status: CANCELLED | OUTPATIENT
Start: 2019-04-25

## 2019-04-25 NOTE — TELEPHONE ENCOUNTER
Patient requesting a refill of the following medications:  1. Hyrdrocodone--LR--3/25/19  2. Clonazepam--LR--3/28/19  LOV--4/25/19  FOV--12/2/19  [facility] Drug Screen--12/6/18  Pain Contract--12-6-18  Checked [place] Prescription Monitoring Program site. No unusual or abnormal behavior noted.

## 2019-04-25 NOTE — TELEPHONE ENCOUNTER
----- Message from Ray Ellison sent at 4/25/2019  2:33 PM CDT -----  Type:  Patient Returning Call    Who Called:  Patient  Who Left Message for Patient:  Shira  Does the patient know what this is regarding?: Refills   Best Call Back Number: [phone]   Additional Information:

## 2019-04-25 NOTE — TELEPHONE ENCOUNTER
Patient notified refill request has been forwarded to Dr. Bird awaiting response. Patient verbalized understanding.

## 2019-04-25 NOTE — PROGRESS NOTES
Subjective:       Patient ID: Nelly Tian is a 68 y.o. female.    Chief Complaint: Medication Refill (norco/klonopin)    Ms. Tian presents to the clinic today for medication documentation for Norco which she takes for chronic pain and clonazepam which she takes for anxiety. She has had a rash to RLE for several months. This is somewhat tender. She has not tried any treatment.  She has COPD and CHF, and she uses portable oxygen. She states she needs a new portable O2 machine. She called the number on the machine when it stopped working and they told her she just needed a new one.  She is smoking 1/2 ppd and would like to quit. Bupropion made her feel like a zombie. She also states her depression is not controlled, however she is not taking full dose of trazodone prescribed (taking 100 mg daily).      Review of Systems   Constitutional: Negative for chills and fever.   HENT: Negative for congestion, ear pain and sinus pressure.    Respiratory: Negative for cough, shortness of breath and wheezing.    Cardiovascular: Negative for chest pain, palpitations and leg swelling.   Gastrointestinal: Negative for abdominal pain, constipation and diarrhea.   Musculoskeletal: Positive for arthralgias and back pain.   Skin: Positive for rash and wound (abdomen, chronic, sees wound care).       Objective:      Physical Exam   Constitutional: She is oriented to person, place, and time. She appears well-nourished. No distress.   HENT:   Head: Normocephalic and atraumatic.   Right Ear: External ear normal.   Left Ear: External ear normal.   Mouth/Throat: Oropharynx is clear and moist. No oropharyngeal exudate.   Eyes: Pupils are equal, round, and reactive to light. Right eye exhibits no discharge. Left eye exhibits no discharge.   Neck: Neck supple. No thyromegaly present.   Cardiovascular: Normal rate and regular rhythm. Exam reveals no gallop and no friction rub.   No murmur heard.  Pulmonary/Chest: Effort normal and breath  sounds normal. No respiratory distress. She has no wheezes. She has no rales.   Lymphadenopathy:     She has no cervical adenopathy.   Neurological: She is alert and oriented to person, place, and time. Coordination normal.   Skin: Skin is warm and dry. Rash noted.        Psychiatric: She has a normal mood and affect. Her behavior is normal. Thought content normal.   Vitals reviewed.          Current Outpatient Medications:     albuterol (ACCUNEB) 0.63 mg/3 mL Nebu, Take 3 mLs (0.63 mg total) by nebulization every 6 (six) hours as needed. Rescue, Disp: 75 mL, Rfl: 11    albuterol (PROVENTIL/VENTOLIN HFA) 90 mcg/actuation inhaler, INHALE 2 PUFFS INTO THE LUNGS EVERY 6 (SIX) HOURS AS NEEDED FOR RESCUE, Disp: 54 g, Rfl: 3    apixaban (ELIQUIS) 5 mg Tab, Take 1 tablet (5 mg total) by mouth 2 (two) times daily., Disp: 180 tablet, Rfl: 3    ascorbic acid, vitamin C, (VITAMIN C) 500 MG tablet, Take 1 tablet (500 mg total) by mouth every evening. (Patient taking differently: Take 1,000 mg by mouth 2 (two) times daily. ), Disp: , Rfl:     atorvastatin (LIPITOR) 20 MG tablet, Take 1 tablet (20 mg total) by mouth once daily., Disp: 90 tablet, Rfl: 3    BREO ELLIPTA 100-25 mcg/dose diskus inhaler, INHALE 1 PUFF INTO THE LUNGS ONCE DAILY., Disp: 60 each, Rfl: 2    budesonide (PULMICORT) 0.5 mg/2 mL nebulizer solution, Take 2 mLs (0.5 mg total) by nebulization once daily. Controller, Disp: 180 mL, Rfl: 4    clonazePAM (KLONOPIN) 0.5 MG tablet, Take 1 tablet (0.5 mg total) by mouth 2 (two) times daily as needed for Anxiety. TAKE ONE TABLET BY MOUTH TWO TIMES A DAY AS NEEDED, Disp: 60 tablet, Rfl: 0    fluticasone (FLONASE) 50 mcg/actuation nasal spray, 1 spray (50 mcg total) by Each Nare route once daily., Disp: 1 Bottle, Rfl: 2    furosemide (LASIX) 40 MG tablet, TAKE 1 TABLET ONE TIME DAILY  FOR  FLUID  OVERLOAD, Disp: 90 tablet, Rfl: 3    gabapentin (NEURONTIN) 800 MG tablet, Take 1 tablet (800 mg total) by mouth 3  (three) times daily., Disp: 270 tablet, Rfl: 3    HYDROcodone-acetaminophen (NORCO)  mg per tablet, Take 1 tablet by mouth every 8 (eight) hours as needed for Pain (Do not take more than 3 a day. Do not mix with other narcotics.)., Disp: 90 tablet, Rfl: 0    levalbuterol (XOPENEX) 0.63 mg/3 mL nebulizer solution, INHALE THE CONTENTS OF 1 VIAL BY NEBULIZATION 4 times  DAILY.    DX: J43.9, Disp: 792 mL, Rfl: 3    lisinopril (PRINIVIL,ZESTRIL) 20 MG tablet, Take 1 tablet (20 mg total) by mouth once daily., Disp: 90 tablet, Rfl: 1    loratadine (CLARITIN) 10 mg tablet, Take 1 tablet (10 mg total) by mouth once daily., Disp: , Rfl: 0    metFORMIN (GLUCOPHAGE) 500 MG tablet, TAKE 1 TABLET (500 MG TOTAL) BY MOUTH 2 (TWO) TIMES DAILY WITH MEALS., Disp: 180 tablet, Rfl: 3    metoprolol succinate (TOPROL-XL) 25 MG 24 hr tablet, Take 0.5 tablets (12.5 mg total) by mouth once daily., Disp: 45 tablet, Rfl: 3    multivitamin (THERAGRAN) tablet, Take 1 tablet by mouth once daily., Disp: , Rfl:     [START ON 6/6/2019] nicotine (NICODERM CQ) 14 mg/24 hr, Place 1 patch onto the skin once daily. for 14 days, Disp: 14 patch, Rfl: 3    nicotine polacrilex 2 MG Lozg, Take 1 lozenge (2 mg total) by mouth as needed., Disp: , Rfl: 0    nystatin (NYSTOP) powder, Apply topically 2 (two) times daily., Disp: 120 g, Rfl: 1    nystatin-triamcinolone (MYCOLOG II) cream, Apply topically 3 (three) times daily., Disp: 30 g, Rfl: 1    omega-3 acid ethyl esters (LOVAZA) 1 gram capsule, 2 (two) times daily. , Disp: , Rfl:     omeprazole (PRILOSEC) 20 MG capsule, Take 1 capsule (20 mg total) by mouth once daily., Disp: 30 capsule, Rfl: 11    ondansetron (ZOFRAN-ODT) 8 MG TbDL, Take 1 tablet (8 mg total) by mouth every 6 (six) hours as needed., Disp: 20 tablet, Rfl: 3    polyethylene glycol (GLYCOLAX) 17 gram/dose powder, , Disp: , Rfl:     potassium chloride SA (KLOR-CON M20) 20 MEQ tablet, Take 1 tablet (20 mEq total) by mouth once  daily., Disp: 30 tablet, Rfl: 6    senna-docusate 8.6-50 mg (PERICOLACE) 8.6-50 mg per tablet, Take 1 tablet by mouth 2 (two) times daily as needed for Constipation., Disp: , Rfl:     spironolactone (ALDACTONE) 25 MG tablet, Take 1 tablet (25 mg total) by mouth once daily., Disp: 30 tablet, Rfl: 11    traZODone (DESYREL) 100 MG tablet, Take 1.5 tablets (150 mg total) by mouth every evening., Disp: 90 tablet, Rfl: 1  Assessment:       1. DDD (degenerative disc disease), lumbar    2. Rash    3. Cigarette nicotine dependence without complication    4. 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 Chronic bronchitis, unspecified chronic bronchitis type    6. Hypertension associated with diabetes    7. Congestive heart failure, unspecified HF chronicity, unspecified heart failure type    8. Other nonthrombocytopenic purpura    9. Major depression, recurrent, chronic    10. Dependency on pain medication    11. Morbid obesity with BMI of 40.0-44.9, adult        Plan:       DDD (degenerative disc disease), lumbar  Stable, continue current medication.    Rash  Possible vasculitis?  -     Ambulatory referral to Dermatology    Cigarette nicotine dependence without complication  -     nicotine (NICODERM CQ) 14 mg/24 hr; Place 1 patch onto the skin once daily. 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    Chronic bronchitis, unspecified chronic bronchitis type  Stable, continue current medication.  6 minute walk test for oxygen    Hypertension associated with diabetes  Stable, continue current  medication.    Congestive heart failure, unspecified HF chronicity, unspecified heart failure type  Stable, continue current medication.    Other nonthrombocytopenic purpura    Major depression, recurrent, chronic  Increase trazodone to prescribed dosage daily.    Dependency on pain medication  Narcan request sent to PCP  Discussed risk for overdose   Contract 12/18  [place]  checked [place] evidence of diversion  [place] screen 12/18    Morbid obesity with BMI of 40.0-44.9, adult  Patient readiness: acceptance and barriers:none    During the course of the visit the patient was educated and counseled about the following:     Obesity:   General weight loss/lifestyle modification strategies discussed (elicit support from others; identify saboteurs; non-food rewards, etc).    Goals: Diabetes: Maintain Hemoglobin A1C below 7, Hypertension: Reduce Blood Pressure and Obesity: Reduce calorie intake and BMI    Did patient meet goals/outcomes: Yes    The following self management tools provided: declined    Patient Instructions (the written plan) was given to the patient/family.     Time spent with patient: 15 minutes    Barriers to medications present (no )    Adverse reactions to current medications (no)    Over the counter medications reviewed (Yes)

## 2019-04-26 RX ORDER — HYDROCODONE BITARTRATE AND ACETAMINOPHEN 10; 325 MG/1; MG/1
1 TABLET ORAL EVERY 8 HOURS PRN
Qty: 90 TABLET | Refills: 0 | Status: SHIPPED | OUTPATIENT
Start: 2019-04-26 | End: 2019-05-23 | Stop reason: SDUPTHER

## 2019-04-26 RX ORDER — CLONAZEPAM 0.5 MG/1
0.5 TABLET ORAL 2 TIMES DAILY PRN
Qty: 60 TABLET | Refills: 0 | Status: SHIPPED | OUTPATIENT
Start: 2019-04-26 | End: 2019-05-23 | Stop reason: SDUPTHER

## 2019-04-30 ENCOUNTER — TELEPHONE (OUTPATIENT)
Dept: FAMILY MEDICINE | Facility: CLINIC | Age: 69
End: 2019-04-30

## 2019-04-30 RX ORDER — BUDESONIDE 0.5 MG/2ML
0.5 INHALANT ORAL DAILY
Qty: 180 ML | Refills: 4 | Status: SHIPPED | OUTPATIENT
Start: 2019-04-30 | End: 2019-05-01 | Stop reason: SDUPTHER

## 2019-05-01 RX ORDER — BUDESONIDE 0.5 MG/2ML
0.5 INHALANT ORAL DAILY
Qty: 180 ML | Refills: 4 | Status: SHIPPED | OUTPATIENT
Start: 2019-05-01 | End: 2019-07-03

## 2019-05-02 DIAGNOSIS — R09.81 CONGESTION OF NASAL SINUS: ICD-10-CM

## 2019-05-02 RX ORDER — FLUTICASONE PROPIONATE 50 MCG
1 SPRAY, SUSPENSION (ML) NASAL DAILY
Qty: 1 BOTTLE | Refills: 2 | Status: CANCELLED | OUTPATIENT
Start: 2019-05-02

## 2019-05-03 ENCOUNTER — HOSPITAL ENCOUNTER (INPATIENT)
Facility: HOSPITAL | Age: 69
LOS: 5 days | Discharge: HOME HEALTH | DRG: 871 | End: 2019-05-08
Attending: EMERGENCY MEDICINE | Admitting: INTERNAL MEDICINE
Payer: MEDICARE

## 2019-05-03 DIAGNOSIS — A41.9 SEPSIS: ICD-10-CM

## 2019-05-03 DIAGNOSIS — R00.0 TACHYCARDIA: ICD-10-CM

## 2019-05-03 DIAGNOSIS — L03.311 ABDOMINAL WALL CELLULITIS: Primary | ICD-10-CM

## 2019-05-03 LAB
ALBUMIN SERPL BCP-MCNC: 3.8 G/DL (ref 3.5–5.2)
ALP SERPL-CCNC: 112 U/L (ref 55–135)
ALT SERPL W/O P-5'-P-CCNC: 27 U/L (ref 10–44)
ANION GAP SERPL CALC-SCNC: 15 MMOL/L (ref 8–16)
AST SERPL-CCNC: 25 U/L (ref 10–40)
BASOPHILS # BLD AUTO: 0 K/UL (ref 0–0.2)
BASOPHILS NFR BLD: 0.1 % (ref 0–1.9)
BILIRUB SERPL-MCNC: 0.4 MG/DL (ref 0.1–1)
BILIRUB UR QL STRIP: NEGATIVE
BNP SERPL-MCNC: 119 PG/ML (ref 0–99)
BUN SERPL-MCNC: 19 MG/DL (ref 8–23)
CALCIUM SERPL-MCNC: 10.3 MG/DL (ref 8.7–10.5)
CHLORIDE SERPL-SCNC: 101 MMOL/L (ref 95–110)
CLARITY UR: CLEAR
CO2 SERPL-SCNC: 26 MMOL/L (ref 23–29)
COLOR UR: YELLOW
CREAT SERPL-MCNC: 0.9 MG/DL (ref 0.5–1.4)
CRP SERPL-MCNC: 24 MG/L (ref 0–8.2)
DIFFERENTIAL METHOD: ABNORMAL
EOSINOPHIL # BLD AUTO: 0.2 K/UL (ref 0–0.5)
EOSINOPHIL NFR BLD: 1.2 % (ref 0–8)
ERYTHROCYTE [DISTWIDTH] IN BLOOD BY AUTOMATED COUNT: 18.9 % (ref 11.5–14.5)
EST. GFR  (AFRICAN AMERICAN): >60 ML/MIN/1.73 M^2
EST. GFR  (NON AFRICAN AMERICAN): >60 ML/MIN/1.73 M^2
GLUCOSE SERPL-MCNC: 161 MG/DL (ref 70–110)
GLUCOSE UR QL STRIP: NEGATIVE
HCT VFR BLD AUTO: 47.6 % (ref 37–48.5)
HGB BLD-MCNC: 15.2 G/DL (ref 12–16)
HGB UR QL STRIP: ABNORMAL
KETONES UR QL STRIP: NEGATIVE
LACTATE SERPL-SCNC: 2.6 MMOL/L (ref 0.5–2.2)
LEUKOCYTE ESTERASE UR QL STRIP: NEGATIVE
LYMPHOCYTES # BLD AUTO: 0.6 K/UL (ref 1–4.8)
LYMPHOCYTES NFR BLD: 3.4 % (ref 18–48)
MCH RBC QN AUTO: 29 PG (ref 27–31)
MCHC RBC AUTO-ENTMCNC: 31.9 G/DL (ref 32–36)
MCV RBC AUTO: 91 FL (ref 82–98)
MONOCYTES # BLD AUTO: 0.8 K/UL (ref 0.3–1)
MONOCYTES NFR BLD: 4.7 % (ref 4–15)
NEUTROPHILS # BLD AUTO: 15.4 K/UL (ref 1.8–7.7)
NEUTROPHILS NFR BLD: 90.6 % (ref 38–73)
NITRITE UR QL STRIP: NEGATIVE
PH UR STRIP: 6 [PH] (ref 5–8)
PLATELET # BLD AUTO: 194 K/UL (ref 150–350)
PMV BLD AUTO: 9.9 FL (ref 9.2–12.9)
POTASSIUM SERPL-SCNC: 4.6 MMOL/L (ref 3.5–5.1)
PROT SERPL-MCNC: 7.8 G/DL (ref 6–8.4)
PROT UR QL STRIP: NEGATIVE
RBC # BLD AUTO: 5.24 M/UL (ref 4–5.4)
SODIUM SERPL-SCNC: 142 MMOL/L (ref 136–145)
SP GR UR STRIP: 1.01 (ref 1–1.03)
URN SPEC COLLECT METH UR: ABNORMAL
UROBILINOGEN UR STRIP-ACNC: NEGATIVE EU/DL
WBC # BLD AUTO: 17 K/UL (ref 3.9–12.7)

## 2019-05-03 PROCEDURE — 12000002 HC ACUTE/MED SURGE SEMI-PRIVATE ROOM

## 2019-05-03 PROCEDURE — 63600175 PHARM REV CODE 636 W HCPCS: Performed by: EMERGENCY MEDICINE

## 2019-05-03 PROCEDURE — 99285 EMERGENCY DEPT VISIT HI MDM: CPT | Mod: 25

## 2019-05-03 PROCEDURE — 96367 TX/PROPH/DG ADDL SEQ IV INF: CPT

## 2019-05-03 PROCEDURE — 99291 CRITICAL CARE FIRST HOUR: CPT | Mod: 25

## 2019-05-03 PROCEDURE — 84443 ASSAY THYROID STIM HORMONE: CPT

## 2019-05-03 PROCEDURE — 83605 ASSAY OF LACTIC ACID: CPT

## 2019-05-03 PROCEDURE — 93005 ELECTROCARDIOGRAM TRACING: CPT

## 2019-05-03 PROCEDURE — S0077 INJECTION, CLINDAMYCIN PHOSP: HCPCS | Performed by: EMERGENCY MEDICINE

## 2019-05-03 PROCEDURE — 83880 ASSAY OF NATRIURETIC PEPTIDE: CPT

## 2019-05-03 PROCEDURE — 25000003 PHARM REV CODE 250: Performed by: EMERGENCY MEDICINE

## 2019-05-03 PROCEDURE — 36415 COLL VENOUS BLD VENIPUNCTURE: CPT

## 2019-05-03 PROCEDURE — 81003 URINALYSIS AUTO W/O SCOPE: CPT

## 2019-05-03 PROCEDURE — 96365 THER/PROPH/DIAG IV INF INIT: CPT

## 2019-05-03 PROCEDURE — 85025 COMPLETE CBC W/AUTO DIFF WBC: CPT

## 2019-05-03 PROCEDURE — 86140 C-REACTIVE PROTEIN: CPT

## 2019-05-03 PROCEDURE — 87040 BLOOD CULTURE FOR BACTERIA: CPT

## 2019-05-03 PROCEDURE — 80053 COMPREHEN METABOLIC PANEL: CPT

## 2019-05-03 RX ORDER — ACETAMINOPHEN 500 MG
1000 TABLET ORAL
Status: COMPLETED | OUTPATIENT
Start: 2019-05-03 | End: 2019-05-03

## 2019-05-03 RX ORDER — CLINDAMYCIN PHOSPHATE 600 MG/50ML
600 INJECTION, SOLUTION INTRAVENOUS
Status: COMPLETED | OUTPATIENT
Start: 2019-05-03 | End: 2019-05-04

## 2019-05-03 RX ADMIN — SODIUM CHLORIDE 1500 ML: 0.9 INJECTION, SOLUTION INTRAVENOUS at 11:05

## 2019-05-03 RX ADMIN — CLINDAMYCIN IN 5 PERCENT DEXTROSE 600 MG: 12 INJECTION, SOLUTION INTRAVENOUS at 11:05

## 2019-05-03 RX ADMIN — PROMETHAZINE HYDROCHLORIDE 6.25 MG: 25 INJECTION INTRAMUSCULAR; INTRAVENOUS at 11:05

## 2019-05-03 RX ADMIN — ACETAMINOPHEN 1000 MG: 500 TABLET ORAL at 11:05

## 2019-05-04 PROBLEM — Z79.891 CHRONIC PRESCRIPTION OPIATE USE: Status: ACTIVE | Noted: 2019-05-04

## 2019-05-04 PROBLEM — Z79.899 CHRONICALLY ON BENZODIAZEPINE THERAPY: Status: ACTIVE | Noted: 2019-05-04

## 2019-05-04 PROBLEM — L03.311 ABDOMINAL WALL CELLULITIS: Status: ACTIVE | Noted: 2019-05-04

## 2019-05-04 PROBLEM — A41.9 SEPSIS: Status: ACTIVE | Noted: 2019-05-04

## 2019-05-04 PROBLEM — E11.9 TYPE 2 DIABETES MELLITUS, WITHOUT LONG-TERM CURRENT USE OF INSULIN: Status: ACTIVE | Noted: 2018-09-10

## 2019-05-04 PROBLEM — G93.41 ENCEPHALOPATHY, METABOLIC: Status: ACTIVE | Noted: 2019-05-04

## 2019-05-04 LAB
ALLENS TEST: ABNORMAL
ANION GAP SERPL CALC-SCNC: 7 MMOL/L (ref 8–16)
BASOPHILS # BLD AUTO: 0 K/UL (ref 0–0.2)
BASOPHILS NFR BLD: 0.2 % (ref 0–1.9)
BUN SERPL-MCNC: 20 MG/DL (ref 8–23)
CALCIUM SERPL-MCNC: 9.2 MG/DL (ref 8.7–10.5)
CHLORIDE SERPL-SCNC: 103 MMOL/L (ref 95–110)
CO2 SERPL-SCNC: 29 MMOL/L (ref 23–29)
CREAT SERPL-MCNC: 0.8 MG/DL (ref 0.5–1.4)
DELSYS: ABNORMAL
DIFFERENTIAL METHOD: ABNORMAL
EOSINOPHIL # BLD AUTO: 0.1 K/UL (ref 0–0.5)
EOSINOPHIL NFR BLD: 0.4 % (ref 0–8)
ERYTHROCYTE [DISTWIDTH] IN BLOOD BY AUTOMATED COUNT: 18.7 % (ref 11.5–14.5)
EST. GFR  (AFRICAN AMERICAN): >60 ML/MIN/1.73 M^2
EST. GFR  (NON AFRICAN AMERICAN): >60 ML/MIN/1.73 M^2
GLUCOSE SERPL-MCNC: 115 MG/DL (ref 70–110)
HCO3 UR-SCNC: 29.5 MMOL/L (ref 24–28)
HCT VFR BLD AUTO: 40.5 % (ref 37–48.5)
HGB BLD-MCNC: 13 G/DL (ref 12–16)
LACTATE SERPL-SCNC: 1.3 MMOL/L (ref 0.5–2.2)
LYMPHOCYTES # BLD AUTO: 0.5 K/UL (ref 1–4.8)
LYMPHOCYTES NFR BLD: 2.5 % (ref 18–48)
MCH RBC QN AUTO: 29 PG (ref 27–31)
MCHC RBC AUTO-ENTMCNC: 32.1 G/DL (ref 32–36)
MCV RBC AUTO: 90 FL (ref 82–98)
MONOCYTES # BLD AUTO: 0.6 K/UL (ref 0.3–1)
MONOCYTES NFR BLD: 3.3 % (ref 4–15)
NEUTROPHILS # BLD AUTO: 17.3 K/UL (ref 1.8–7.7)
NEUTROPHILS NFR BLD: 93.6 % (ref 38–73)
PCO2 BLDA: 49.6 MMHG (ref 35–45)
PH SMN: 7.38 [PH] (ref 7.35–7.45)
PLATELET # BLD AUTO: 197 K/UL (ref 150–350)
PMV BLD AUTO: 9.3 FL (ref 9.2–12.9)
PO2 BLDA: 74 MMHG (ref 80–100)
POC BE: 4 MMOL/L
POC PCO2 TEMP: 50.2 MMHG
POC PH TEMP: 7.38
POC PO2 TEMP: 76 MMHG
POC SATURATED O2: 94 % (ref 95–100)
POC TCO2: 31 MMOL/L (ref 23–27)
POC TEMPERATURE: ABNORMAL
POCT GLUCOSE: 115 MG/DL (ref 70–110)
POCT GLUCOSE: 91 MG/DL (ref 70–110)
POCT GLUCOSE: 92 MG/DL (ref 70–110)
POCT GLUCOSE: 96 MG/DL (ref 70–110)
POTASSIUM SERPL-SCNC: 4.1 MMOL/L (ref 3.5–5.1)
RBC # BLD AUTO: 4.49 M/UL (ref 4–5.4)
SAMPLE: ABNORMAL
SITE: ABNORMAL
SODIUM SERPL-SCNC: 139 MMOL/L (ref 136–145)
TSH SERPL DL<=0.005 MIU/L-ACNC: 0.41 UIU/ML (ref 0.4–4)
WBC # BLD AUTO: 18.5 K/UL (ref 3.9–12.7)

## 2019-05-04 PROCEDURE — 82803 BLOOD GASES ANY COMBINATION: CPT

## 2019-05-04 PROCEDURE — 25000003 PHARM REV CODE 250: Performed by: INTERNAL MEDICINE

## 2019-05-04 PROCEDURE — 83605 ASSAY OF LACTIC ACID: CPT

## 2019-05-04 PROCEDURE — 99900035 HC TECH TIME PER 15 MIN (STAT)

## 2019-05-04 PROCEDURE — 25000003 PHARM REV CODE 250: Performed by: HOSPITALIST

## 2019-05-04 PROCEDURE — 25000003 PHARM REV CODE 250: Performed by: EMERGENCY MEDICINE

## 2019-05-04 PROCEDURE — 36600 WITHDRAWAL OF ARTERIAL BLOOD: CPT

## 2019-05-04 PROCEDURE — 36415 COLL VENOUS BLD VENIPUNCTURE: CPT

## 2019-05-04 PROCEDURE — 85025 COMPLETE CBC W/AUTO DIFF WBC: CPT

## 2019-05-04 PROCEDURE — 63600175 PHARM REV CODE 636 W HCPCS: Performed by: EMERGENCY MEDICINE

## 2019-05-04 PROCEDURE — 12000002 HC ACUTE/MED SURGE SEMI-PRIVATE ROOM

## 2019-05-04 PROCEDURE — S0077 INJECTION, CLINDAMYCIN PHOSP: HCPCS | Performed by: INTERNAL MEDICINE

## 2019-05-04 PROCEDURE — 94761 N-INVAS EAR/PLS OXIMETRY MLT: CPT

## 2019-05-04 PROCEDURE — 27000221 HC OXYGEN, UP TO 24 HOURS

## 2019-05-04 PROCEDURE — 25000242 PHARM REV CODE 250 ALT 637 W/ HCPCS: Performed by: INTERNAL MEDICINE

## 2019-05-04 PROCEDURE — 94640 AIRWAY INHALATION TREATMENT: CPT

## 2019-05-04 PROCEDURE — 63600175 PHARM REV CODE 636 W HCPCS: Performed by: HOSPITALIST

## 2019-05-04 PROCEDURE — 80048 BASIC METABOLIC PNL TOTAL CA: CPT

## 2019-05-04 PROCEDURE — 87040 BLOOD CULTURE FOR BACTERIA: CPT | Mod: 59

## 2019-05-04 RX ORDER — IBUPROFEN 400 MG/1
800 TABLET ORAL
Status: COMPLETED | OUTPATIENT
Start: 2019-05-04 | End: 2019-05-04

## 2019-05-04 RX ORDER — GLUCAGON 1 MG
1 KIT INJECTION
Status: DISCONTINUED | OUTPATIENT
Start: 2019-05-04 | End: 2019-05-04

## 2019-05-04 RX ORDER — ENOXAPARIN SODIUM 100 MG/ML
40 INJECTION SUBCUTANEOUS EVERY 24 HOURS
Status: DISCONTINUED | OUTPATIENT
Start: 2019-05-04 | End: 2019-05-04 | Stop reason: DRUGHIGH

## 2019-05-04 RX ORDER — GLUCAGON 1 MG
1 KIT INJECTION
Status: DISCONTINUED | OUTPATIENT
Start: 2019-05-04 | End: 2019-05-08 | Stop reason: HOSPADM

## 2019-05-04 RX ORDER — LISINOPRIL 10 MG/1
20 TABLET ORAL DAILY
Status: DISCONTINUED | OUTPATIENT
Start: 2019-05-04 | End: 2019-05-04

## 2019-05-04 RX ORDER — VANCOMYCIN HCL IN 5 % DEXTROSE 1G/250ML
1000 PLASTIC BAG, INJECTION (ML) INTRAVENOUS
Status: DISCONTINUED | OUTPATIENT
Start: 2019-05-04 | End: 2019-05-04

## 2019-05-04 RX ORDER — SODIUM CHLORIDE 0.9 % (FLUSH) 0.9 %
10 SYRINGE (ML) INJECTION
Status: DISCONTINUED | OUTPATIENT
Start: 2019-05-04 | End: 2019-05-08 | Stop reason: HOSPADM

## 2019-05-04 RX ORDER — FLUTICASONE FUROATE AND VILANTEROL 100; 25 UG/1; UG/1
1 POWDER RESPIRATORY (INHALATION) DAILY
Status: DISCONTINUED | OUTPATIENT
Start: 2019-05-04 | End: 2019-05-08 | Stop reason: HOSPADM

## 2019-05-04 RX ORDER — INSULIN ASPART 100 [IU]/ML
0-5 INJECTION, SOLUTION INTRAVENOUS; SUBCUTANEOUS EVERY 6 HOURS PRN
Status: DISCONTINUED | OUTPATIENT
Start: 2019-05-04 | End: 2019-05-08 | Stop reason: HOSPADM

## 2019-05-04 RX ORDER — PANTOPRAZOLE SODIUM 40 MG/1
40 TABLET, DELAYED RELEASE ORAL DAILY
Status: DISCONTINUED | OUTPATIENT
Start: 2019-05-04 | End: 2019-05-08 | Stop reason: HOSPADM

## 2019-05-04 RX ORDER — ACETAMINOPHEN 325 MG/1
650 TABLET ORAL EVERY 6 HOURS PRN
Status: DISCONTINUED | OUTPATIENT
Start: 2019-05-04 | End: 2019-05-08 | Stop reason: HOSPADM

## 2019-05-04 RX ORDER — IBUPROFEN 200 MG
24 TABLET ORAL
Status: DISCONTINUED | OUTPATIENT
Start: 2019-05-04 | End: 2019-05-08 | Stop reason: HOSPADM

## 2019-05-04 RX ORDER — CLINDAMYCIN PHOSPHATE 600 MG/50ML
600 INJECTION, SOLUTION INTRAVENOUS
Status: DISCONTINUED | OUTPATIENT
Start: 2019-05-04 | End: 2019-05-08 | Stop reason: HOSPADM

## 2019-05-04 RX ORDER — ENOXAPARIN SODIUM 100 MG/ML
40 INJECTION SUBCUTANEOUS EVERY 12 HOURS
Status: DISCONTINUED | OUTPATIENT
Start: 2019-05-04 | End: 2019-05-06

## 2019-05-04 RX ORDER — CLONAZEPAM 0.5 MG/1
0.5 TABLET ORAL 2 TIMES DAILY
Status: DISCONTINUED | OUTPATIENT
Start: 2019-05-04 | End: 2019-05-08 | Stop reason: HOSPADM

## 2019-05-04 RX ORDER — ATORVASTATIN CALCIUM 20 MG/1
20 TABLET, FILM COATED ORAL DAILY
Status: DISCONTINUED | OUTPATIENT
Start: 2019-05-04 | End: 2019-05-08 | Stop reason: HOSPADM

## 2019-05-04 RX ORDER — IPRATROPIUM BROMIDE AND ALBUTEROL SULFATE 2.5; .5 MG/3ML; MG/3ML
3 SOLUTION RESPIRATORY (INHALATION) EVERY 6 HOURS PRN
Status: DISCONTINUED | OUTPATIENT
Start: 2019-05-04 | End: 2019-05-07

## 2019-05-04 RX ORDER — IBUPROFEN 200 MG
16 TABLET ORAL
Status: DISCONTINUED | OUTPATIENT
Start: 2019-05-04 | End: 2019-05-08 | Stop reason: HOSPADM

## 2019-05-04 RX ORDER — SPIRONOLACTONE 25 MG/1
25 TABLET ORAL DAILY
Status: DISCONTINUED | OUTPATIENT
Start: 2019-05-04 | End: 2019-05-04

## 2019-05-04 RX ADMIN — CLINDAMYCIN IN 5 PERCENT DEXTROSE 600 MG: 12 INJECTION, SOLUTION INTRAVENOUS at 11:05

## 2019-05-04 RX ADMIN — FLUTICASONE FUROATE AND VILANTEROL TRIFENATATE 1 PUFF: 100; 25 POWDER RESPIRATORY (INHALATION) at 09:05

## 2019-05-04 RX ADMIN — IBUPROFEN 800 MG: 400 TABLET, FILM COATED ORAL at 12:05

## 2019-05-04 RX ADMIN — APIXABAN 5 MG: 2.5 TABLET, FILM COATED ORAL at 08:05

## 2019-05-04 RX ADMIN — CLONAZEPAM 0.5 MG: 0.5 TABLET ORAL at 09:05

## 2019-05-04 RX ADMIN — ENOXAPARIN SODIUM 40 MG: 100 INJECTION SUBCUTANEOUS at 09:05

## 2019-05-04 RX ADMIN — VANCOMYCIN HYDROCHLORIDE 1500 MG: 1 INJECTION, POWDER, LYOPHILIZED, FOR SOLUTION INTRAVENOUS at 02:05

## 2019-05-04 RX ADMIN — SODIUM CHLORIDE 1000 ML: 0.9 INJECTION, SOLUTION INTRAVENOUS at 11:05

## 2019-05-04 RX ADMIN — ACETAMINOPHEN 650 MG: 325 TABLET, FILM COATED ORAL at 09:05

## 2019-05-04 RX ADMIN — PANTOPRAZOLE SODIUM 40 MG: 40 TABLET, DELAYED RELEASE ORAL at 08:05

## 2019-05-04 RX ADMIN — CLINDAMYCIN IN 5 PERCENT DEXTROSE 600 MG: 12 INJECTION, SOLUTION INTRAVENOUS at 05:05

## 2019-05-04 RX ADMIN — VANCOMYCIN HYDROCHLORIDE 2000 MG: 1 INJECTION, POWDER, LYOPHILIZED, FOR SOLUTION INTRAVENOUS at 12:05

## 2019-05-04 RX ADMIN — ATORVASTATIN CALCIUM 20 MG: 20 TABLET, FILM COATED ORAL at 08:05

## 2019-05-04 RX ADMIN — SODIUM CHLORIDE, SODIUM LACTATE, POTASSIUM CHLORIDE, AND CALCIUM CHLORIDE 1000 ML: .6; .31; .03; .02 INJECTION, SOLUTION INTRAVENOUS at 04:05

## 2019-05-04 RX ADMIN — ACETAMINOPHEN 650 MG: 325 TABLET, FILM COATED ORAL at 11:05

## 2019-05-04 NOTE — PROGRESS NOTES
[facility] Hospital Medicine  Progress Note    Patient Name: Nelly Tian  MRN: [id number]  Patient Class: IP- Inpatient   Admission Date: 5/3/2019  Length of Stay: 0 days  Attending Physician: Baljinder Judge MD  Primary Care Provider: Constantine Bird MD    Subjective:     Principal Problem:Abdominal wall cellulitis    HPI:  Ms. Tian is a 67 yo lady who with pmhx of COPD, CHF, HTN, DM2, A fib, MI, morbid obesity, recurrent panniculitis who presented to the ED with erythema and swelling of left abd wall x 1 day. Daughter reports she was in her normal state of health until this morning where she starting having shaking chills and redness/pain/swelling around left abd wall. Known hernia on left. No other complaints. Patient sees wound care in outpatient setting. In the ED, she was febrile. Elevated WBC at 17 with neutrophilia. Lactate 2.6. Given 1.5L NS and vancomycin & clindamycin. Medicine was consulted for admission.     Hospital Course:  No notes on file    Interval History: Patient reported chills in the morning. She had a low grade temperature at that time. Repeat blood cultures     Review of Systems   Constitutional: Positive for chills and fever.   HENT: Negative for congestion and dental problem.    Eyes: Negative for discharge and itching.   Respiratory: Negative for apnea and chest tightness.    Cardiovascular: Negative for chest pain and leg swelling.   Gastrointestinal: Negative for abdominal distention and abdominal pain.   Endocrine: Negative for cold intolerance and heat intolerance.   Allergic/Immunologic: Negative for environmental allergies.   Neurological: Negative for dizziness and facial asymmetry.   Psychiatric/Behavioral: Negative for agitation.     Objective:     Vital Signs (Most Recent):  Temp: 97.9 °F (36.6 °C) (05/04/19 1503)  Pulse: 95 (05/04/19 1503)  Resp: 18 (05/04/19 1503)  BP: (!) 107/52 (05/04/19 1503)  SpO2: (!) 92 % (05/04/19 1503) Vital Signs (24h  Range):  Temp:  [97.5 °F (36.4 °C)-102 °F (38.9 °C)] 97.9 °F (36.6 °C)  Pulse:  [] 95  Resp:  [15-26] 18  SpO2:  [90 %-99 %] 92 %  BP: ()/() 107/52     Weight: 121.1 kg (267 lb)  Body mass index is 40.6 kg/m².    Intake/Output Summary (Last 24 hours) at 5/4/2019 1646  Last data filed at 5/4/2019 1325  Gross per 24 hour   Intake 100 ml   Output 400 ml   Net -300 ml      Physical Exam   Constitutional: She is oriented to person, place, and time. No distress.   Morbid obesity   HENT:   Head: Normocephalic and atraumatic.   Nose: Nose normal.   Mouth/Throat: No oropharyngeal exudate.   Eyes: Conjunctivae are normal. Right eye exhibits no discharge. Left eye exhibits no discharge. No scleral icterus.   Neck: Normal range of motion. Neck supple.   Cardiovascular: Normal rate, regular rhythm, normal heart sounds and intact distal pulses. Exam reveals no gallop and no friction rub.   No murmur heard.  Pulmonary/Chest: Effort normal and breath sounds normal. No stridor. No respiratory distress. She has no wheezes.   Abdominal: Soft. Bowel sounds are normal. She exhibits no distension. There is no tenderness. There is no rebound.   Obese abdomen. Left abdominal wall with erythema, tenderness, induration and warmth. No purulent drainage noted. No fluctuance.   Musculoskeletal: Normal range of motion. She exhibits no edema or deformity.   Neurological: She is alert and oriented to person, place, and time.   Skin: Skin is warm. Capillary refill takes less than 2 seconds. Rash noted. She is not diaphoretic. There is erythema.   Nursing note and vitals reviewed.    Significant Labs:   A1C:   Recent Labs   Lab 01/19/19  2232   HGBA1C 5.6     ABGs:   Recent Labs   Lab 05/04/19  0239   PH 7.383   PCO2 49.6*   HCO3 29.5*   POCSATURATED 94*   BE 4     Bilirubin:   Recent Labs   Lab 05/03/19  2303   BILITOT 0.4     Blood Culture: No results for input(s): LABBLOO in the last 48 hours.  BMP:   Recent Labs   Lab  05/04/19  0451   *      K 4.1      CO2 29   BUN 20   CREATININE 0.8   CALCIUM 9.2     CBC:   Recent Labs   Lab 05/03/19  2304 05/04/19  0451   WBC 17.00* 18.50*   HGB 15.2 13.0   HCT 47.6 40.5    197     CMP:   Recent Labs   Lab 05/03/19  2303 05/04/19  0451    139   K 4.6 4.1    103   CO2 26 29   * 115*   BUN 19 20   CREATININE 0.9 0.8   CALCIUM 10.3 9.2   PROT 7.8  --    ALBUMIN 3.8  --    BILITOT 0.4  --    ALKPHOS 112  --    AST 25  --    ALT 27  --    ANIONGAP 15 7*   EGFRNONAA >60 >60     Cardiac Markers:   Recent Labs   Lab 05/03/19  2304   *     Coagulation: No results for input(s): PT, INR, APTT in the last 48 hours.  Lactic Acid:   Recent Labs   Lab 05/03/19 2253 05/04/19 0451   LACTATE 2.6* 1.3     Lipase: No results for input(s): LIPASE in the last 48 hours.  Lipid Panel: No results for input(s): CHOL, HDL, LDLCALC, TRIG, CHOLHDL in the last 48 hours.  Magnesium: No results for input(s): MG in the last 48 hours.  Pathology Results  (Last 10 years)    None        POCT Glucose:   Recent Labs   Lab 05/04/19  0558 05/04/19  1145   POCTGLUCOSE 115* 96     Prealbumin: No results for input(s): PREALBUMIN in the last 48 hours.  Respiratory Culture: No results for input(s): GSRESP, RESPIRATORYC in the last 48 hours.  Troponin: No results for input(s): TROPONINI in the last 48 hours.  TSH:   Recent Labs   Lab 05/03/19  2310   TSH 0.405     Significant Imaging: I have reviewed and interpreted all pertinent imaging results/findings within the past 24 hours.    Assessment/Plan:      * Abdominal wall cellulitis  - Recurrent infections per history and chart review  - Continue Vancomycin and Clindamycin  - F/u blood cultures  - CT scan of the abdominal wall ordered to check for possible abscess  - ID consult.  - PRN Acetaminophen.    Chronic atrial fibrillation  - Continue beta blocker, will hold Eliquis in case a drainable abscess is identified.    Chronic  prescription opiate use  Norco  #90 filled on 4/27    Chronically on benzodiazepine therapy  - Patient filled Clonazepam 0.5 mg #60 on 4/27  - Was held secondary to encephalopathy  - Will resume slowly.    Encephalopathy, metabolic  - Resolved. Likely related to sepsis.  - CT head did not show any acute abnormality.    Other nonthrombocytopenic purpura  Noted. No issues.    Type 2 diabetes mellitus, without long-term current use of insulin  A1c 5.6 in Jan 2019  - Low dose sliding scale      CHF (congestive heart failure)  No evidence of exacerbation    COPD (chronic obstructive pulmonary disease)  - No evidence of exacerbation  - Bronchodilators PRN  - Home meds    Hypertension associated with diabetes  No acute issues  - Home meds      VTE Risk Mitigation (From admission, onward)        Ordered     IP VTE HIGH RISK PATIENT  Once      05/04/19 0154        Baljinder Judge MD  Department of Hospital Medicine   [facility]

## 2019-05-04 NOTE — ASSESSMENT & PLAN NOTE
Likely related to sepsis. No other identifiable etiology.  - ABG with benzo/opiate usage  - Check TSH  - CT head with history of fall

## 2019-05-04 NOTE — ASSESSMENT & PLAN NOTE
Patient filled Clonazepam 0.5 mg #60 on 4/27  - Holding with encephalopathy  - Once improved mental status would restart low dose to avoid withdrawl

## 2019-05-04 NOTE — PROGRESS NOTES
05/04/19 0235   Patient Assessment/Suction   Level of Consciousness (AVPU) responds to voice   Respiratory Effort Unlabored   Expansion/Accessory Muscles/Retractions no use of accessory muscles;no retractions   All Lung Fields Breath Sounds diminished   Rhythm/Pattern, Respiratory shallow;unlabored;pattern regular   Cough Frequency with stimulation   Cough Type fair;nonproductive   PRE-TX-O2   O2 Device (Oxygen Therapy) nasal cannula   $ Is the patient on Low Flow Oxygen? Yes   Flow (L/min) 2   SpO2 (!) 94 %   Pulse Oximetry Type Intermittent   $ Pulse Oximetry - Multiple Charge Pulse Oximetry - Multiple   Pulse 66   Resp 15   Aerosol Therapy   $ Aerosol Therapy Charges PRN treatment not required  (Pt denies need for Aerosol tx.)   Labs   $ Was an ABG obtained? Arterial Puncture;ISTAT - Blood gas   $ Labs Tech Time 15 min   Critical Value Communication   Name of Notified Physician/Designee ETHEL Ulloa MD   Date Result Received 05/04/19   Time Result Received 0230   Resulting Department of Critical Value Resp   Who communicated critical value from resulting department? Delbert   Date Notified 05/04/19   Time Notified 0230   Read Back Verification Yes

## 2019-05-04 NOTE — PROGRESS NOTES
5/4/2019 Serum creatinine: 0.8 mg/dL 05/04/19 0451  Estimated creatinine clearance: 93.5 mL/min Body mass index is 40.6 kg/m².     Enoxaparin 40mg every 24 hours ordered.     Changed to enoxaparin 40mg every 12 hours per renal dose adjustment protocol.    Carri Manrique, MichaelD

## 2019-05-04 NOTE — NURSING
MD made aware of low bp. All bp meds held and clonazepam held also. Informed MD she has npo status and is requesting food. She is also c/o metal taste in mouth.

## 2019-05-04 NOTE — HPI
Ms. Tian is a 67 yo lady who with pmhx of COPD, CHF, HTN, DM2, A fib, MI, morbid obesity, recurrent panniculitis who presented to the ED with erythema and swelling of left abd wall x 1 day. Daughter reports she was in her normal state of health until this morning where she starting having shaking chills and redness/pain/swelling around left abd wall. Known hernia on left. No other complaints. Patient sees wound care in outpatient setting. In the ED, she was febrile. Elevated WBC at 17 with neutrophilia. Lactate 2.6. Given 1.5L NS and vancomycin & clindamycin. Medicine was consulted for admission.

## 2019-05-04 NOTE — ASSESSMENT & PLAN NOTE
- Recurrent infections per history and chart review  - Continue Vancomycin and Clindamycin  - F/u blood cultures  - CT scan of the abdominal wall ordered to check for possible abscess  - ID consult.  - PRN Acetaminophen.

## 2019-05-04 NOTE — PLAN OF CARE
05/04/19 0942   PRE-TX-O2   O2 Device (Oxygen Therapy) nasal cannula   $ Is the patient on Low Flow Oxygen? Yes   Flow (L/min) 2   SpO2 97 %   Pulse Oximetry Type Intermittent   $ Pulse Oximetry - Multiple Charge Pulse Oximetry - Multiple   Pulse 67   Resp 18   Aerosol Therapy   $ Aerosol Therapy Charges PRN treatment not required   Respiratory Treatment Status (SVN) PRN treatment not required   Inhaler   $ Inhaler Charges MDI (Metered Dose Inahler) Treatment   Respiratory Treatment Status (Inhaler) given   Treatment Route (Inhaler) mouthpiece   Patient Position (Inhaler) HOB elevated   Signs of Intolerance (Inhaler) none

## 2019-05-04 NOTE — PT/OT/SLP PROGRESS
Physical Therapy      Patient Name:  Nelly Tian   MRN:  [id number]    PT eval attempted x 2 this am. Pt with low BP per nurse Toma. Will re attempt 05-.    Cyndy Banda, PT

## 2019-05-04 NOTE — H&P
[facility] Medicine  History & Physical    Patient Name: Nelly Tian  MRN: [id number]  Admission Date: 5/3/2019  Attending Physician: Louisa Menard MD   Primary Care Provider: Constantine Bird MD         Patient information was obtained from patient, relative(s) and ER records.     Subjective:     Principal Problem:Abdominal wall cellulitis    Chief Complaint:   Chief Complaint   Patient presents with    Wound Infection     abdominal left lower quad        HPI: Ms. Tian is a 69 yo lady who with pmhx of COPD, CHF, HTN, DM2, A fib, MI, morbid obesity, recurrent panniculitis who presented to the ED with erythema and swelling of left abd wall x 1 day. Daughter reports she was in her normal state of health until this morning where she starting having shaking chills and redness/pain/swelling around left abd wall. Known hernia on left. No other complaints. Patient sees wound care in outpatient setting. In the ED, she was febrile. Elevated WBC at 17 with neutrophilia. Lactate 2.6. Given 1.5L NS and vancomycin & clindamycin. Medicine consulted for admission.     Past Medical History:   Diagnosis Date    *Atrial flutter     Angina pectoris 9/18/2017    Anxiety     Arthritis     Asthma     Atrial fibrillation     Back pain     Cataract     OD    CHF (congestive heart failure)     COPD (chronic obstructive pulmonary disease)     Depression     Diabetes mellitus     Emphysema of lung     Heart failure     Hepatomegaly 2/3/2016    Hernia     History of MI (myocardial infarction) 1/19/2016    Hypercapnic respiratory failure, chronic 11/16/2016    Hyperlipidemia     Hypertension     Iron deficiency anemia 2/3/2016    Myocardial infarction     Obesity     Peripheral vascular disease     Pneumonia     Polyneuropathy     Retinal detachment     OS    Septic shock 4/23/2017    Skin ulcer 3/18/2017    Tobacco dependence     Type II or unspecified type diabetes mellitus  with neurological manifestations, not stated as uncontrolled(250.60)        Past Surgical History:   Procedure Laterality Date    BREAST BIOPSY Left 10 yrs ago    benign    CATARACT EXTRACTION Left     OS      SECTION      x2    EYE SURGERY      HYSTERECTOMY      partial    OOPHORECTOMY      one ovary conserved    RETINAL DETACHMENT SURGERY      buckle --OS    TONSILLECTOMY         Review of patient's allergies indicates:   Allergen Reactions    Dilaudid [hydromorphone] Anaphylaxis     Other reaction(s): Anaphylaxis  Other reaction(s): Unknown    Zyvox [linezolid in dextrose 5%] Shortness Of Breath       No current facility-administered medications on file prior to encounter.      Current Outpatient Medications on File Prior to Encounter   Medication Sig    albuterol (ACCUNEB) 0.63 mg/3 mL Nebu Take 3 mLs (0.63 mg total) by nebulization every 6 (six) hours as needed. Rescue    albuterol (PROVENTIL/VENTOLIN HFA) 90 mcg/actuation inhaler INHALE 2 PUFFS INTO THE LUNGS EVERY 6 (SIX) HOURS AS NEEDED FOR RESCUE    apixaban (ELIQUIS) 5 mg Tab Take 1 tablet (5 mg total) by mouth 2 (two) times daily.    ascorbic acid, vitamin C, (VITAMIN C) 500 MG tablet Take 1 tablet (500 mg total) by mouth every evening. (Patient taking differently: Take 1,000 mg by mouth 2 (two) times daily. )    clonazePAM (KLONOPIN) 0.5 MG tablet Take 1 tablet (0.5 mg total) by mouth 2 (two) times daily as needed for Anxiety. TAKE ONE TABLET BY MOUTH TWO TIMES A DAY AS NEEDED    fluticasone (FLONASE) 50 mcg/actuation nasal spray 1 spray (50 mcg total) by Each Nare route once daily.    furosemide (LASIX) 40 MG tablet TAKE 1 TABLET ONE TIME DAILY  FOR  FLUID  OVERLOAD    gabapentin (NEURONTIN) 800 MG tablet Take 1 tablet (800 mg total) by mouth 3 (three) times daily.    lisinopril (PRINIVIL,ZESTRIL) 20 MG tablet Take 1 tablet (20 mg total) by mouth once daily.    metFORMIN (GLUCOPHAGE) 500 MG tablet TAKE 1 TABLET (500 MG TOTAL)  BY MOUTH 2 (TWO) TIMES DAILY WITH MEALS.    metoprolol succinate (TOPROL-XL) 25 MG 24 hr tablet Take 0.5 tablets (12.5 mg total) by mouth once daily.    potassium chloride SA (KLOR-CON M20) 20 MEQ tablet Take 1 tablet (20 mEq total) by mouth once daily.    traZODone (DESYREL) 100 MG tablet Take 1.5 tablets (150 mg total) by mouth every evening.    atorvastatin (LIPITOR) 20 MG tablet Take 1 tablet (20 mg total) by mouth once daily.    BREO ELLIPTA 100-25 mcg/dose diskus inhaler INHALE 1 PUFF INTO THE LUNGS ONCE DAILY.    budesonide (PULMICORT) 0.5 mg/2 mL nebulizer solution Take 2 mLs (0.5 mg total) by nebulization once daily. Controller    HYDROcodone-acetaminophen (NORCO)  mg per tablet Take 1 tablet by mouth every 8 (eight) hours as needed for Pain (Do not take more than 3 a day. Do not mix with other narcotics.).    levalbuterol (XOPENEX) 0.63 mg/3 mL nebulizer solution INHALE THE CONTENTS OF 1 VIAL BY NEBULIZATION 4 times  DAILY.    DX: J43.9    loratadine (CLARITIN) 10 mg tablet Take 1 tablet (10 mg total) by mouth once daily.    multivitamin (THERAGRAN) tablet Take 1 tablet by mouth once daily.    [START ON 6/6/2019] nicotine (NICODERM CQ) 14 mg/24 hr Place 1 patch onto the skin once daily. for 14 days    nicotine polacrilex 2 MG Lozg Take 1 lozenge (2 mg total) by mouth as needed.    nystatin (NYSTOP) powder Apply topically 2 (two) times daily.    nystatin-triamcinolone (MYCOLOG II) cream Apply topically 3 (three) times daily.    omega-3 acid ethyl esters (LOVAZA) 1 gram capsule 2 (two) times daily.     omeprazole (PRILOSEC) 20 MG capsule Take 1 capsule (20 mg total) by mouth once daily.    ondansetron (ZOFRAN-ODT) 8 MG TbDL Take 1 tablet (8 mg total) by mouth every 6 (six) hours as needed.    polyethylene glycol (GLYCOLAX) 17 gram/dose powder     senna-docusate 8.6-50 mg (PERICOLACE) 8.6-50 mg per tablet Take 1 tablet by mouth 2 (two) times daily as needed for Constipation.     spironolactone (ALDACTONE) 25 MG tablet Take 1 tablet (25 mg total) by mouth once daily.     Family History     Problem Relation (Age of Onset)    Alcohol abuse Mother    Arthritis Father, Sister    Blindness Son    Cancer Brother    Crohn's disease Sister    Early death Sister (30)    Heart disease Father, Sister (32), Sister    No Known Problems Daughter        Tobacco Use    Smoking status: Current Some Day Smoker     Packs/day: 0.25     Years: 50.00     Pack years: 12.50     Types: Cigarettes     Start date: 1/19/1968    Smokeless tobacco: Never Used   Substance and Sexual Activity    Alcohol use: No     Alcohol/week: 0.0 oz     Frequency: Never    Drug use: No    Sexual activity: Not Currently     Review of Systems   Constitutional: Negative for activity change, chills and fever.   HENT: Negative for congestion, ear pain and nosebleeds.    Eyes: Negative for pain and discharge.   Respiratory: Negative for cough, chest tightness, shortness of breath, wheezing and stridor.    Cardiovascular: Negative for chest pain, palpitations and leg swelling.   Gastrointestinal: Negative for abdominal distention, abdominal pain, constipation, diarrhea, nausea and vomiting.   Endocrine: Negative for cold intolerance and heat intolerance.   Genitourinary: Negative for difficulty urinating, dysuria and hematuria.   Musculoskeletal: Negative for arthralgias, back pain and joint swelling.   Skin: Positive for color change, rash and wound. Negative for pallor.   Allergic/Immunologic: Negative for environmental allergies and immunocompromised state.   Neurological: Negative for dizziness, seizures and headaches.   Hematological: Negative for adenopathy. Does not bruise/bleed easily.   Psychiatric/Behavioral: Negative for agitation and behavioral problems.   All other systems reviewed and are negative.    Objective:     Vital Signs (Most Recent):  Temp: 99.1 °F (37.3 °C) (05/04/19 0142)  Pulse: 66 (05/04/19 0235)  Resp: 15  (05/04/19 0235)  BP: (!) 111/47 (05/04/19 0142)  SpO2: (!) 94 % (05/04/19 0235) Vital Signs (24h Range):  Temp:  [99.1 °F (37.3 °C)-102 °F (38.9 °C)] 99.1 °F (37.3 °C)  Pulse:  [] 66  Resp:  [15-26] 15  SpO2:  [90 %-99 %] 94 %  BP: (106-173)/() 111/47     Weight: 121.1 kg (267 lb)  Body mass index is 40.6 kg/m².    Physical Exam    Constitutional: No distress.   Morbid obesity   HENT:   Head: Normocephalic and atraumatic.   Nose: Nose normal.   Mouth/Throat: No oropharyngeal exudate.   Eyes: Conjunctivae are normal. Right eye exhibits no discharge. Left eye exhibits no discharge. No scleral icterus.   Neck: Normal range of motion. Neck supple.   Cardiovascular: Normal rate, regular rhythm, normal heart sounds and intact distal pulses. Exam reveals no gallop and no friction rub.   No murmur heard.  Pulmonary/Chest: Effort normal and breath sounds normal. No stridor. No respiratory distress. She has no wheezes.   Abdominal: Soft. Bowel sounds are normal. She exhibits no distension. There is no tenderness. There is no rebound.   Obese abdomen. Left abdominal wall with erythema, tenderness, induration and warmth. No purulent drainage noted. No fluctuance.   Musculoskeletal: Normal range of motion. She exhibits no edema or deformity.   Neurological: She is alert.   Oriented to person only.    Skin: Skin is warm. Capillary refill takes less than 2 seconds. Rash noted. She is not diaphoretic. There is erythema.   Nursing note and vitals reviewed.     Significant Labs: All pertinent labs within the past 24 hours have been reviewed.    Significant Imaging: I have reviewed and interpreted all pertinent imaging results/findings within the past 24 hours.    Assessment/Plan:     * Abdominal wall cellulitis  Recurrent infections per history and chart review  - Vancomycin  - Clindamycin  - F/u blood cultures  - Tylenol for fevers    Chronic prescription opiate use  Norco  #90 filled on 4/27  - Holding with  encephalopathy    Chronically on benzodiazepine therapy  Patient filled Clonazepam 0.5 mg #60 on 4/27  - Holding with encephalopathy  - Once improved mental status would restart low dose to avoid withdrawl    Encephalopathy, metabolic  Likely related to sepsis. No other identifiable etiology.  - ABG with benzo/opiate usage  - Check TSH  - CT head with history of fall    Other nonthrombocytopenic purpura  Noted. No issues.    Type 2 diabetes mellitus, without long-term current use of insulin  A1c 5.6 in Jan 2019  - Low dose sliding scale      CHF (congestive heart failure)  No evidence of exacerbation    COPD (chronic obstructive pulmonary disease)  No evidence of exacerbation  - Bronchodilators PRN  - Home meds    Hypertension associated with diabetes  No acute issues  - Home meds    Chronic atrial fibrillation  Eliquis  Beta blocker      VTE Risk Mitigation (From admission, onward)        Ordered     apixaban tablet 5 mg  2 times daily      05/04/19 0154     IP VTE HIGH RISK PATIENT  Once      05/04/19 0154             Elvin Ulloa MD  Department of Hospital Medicine   [facility]

## 2019-05-04 NOTE — H&P
[facility] Medicine  History & Physical    Patient Name: Nelly Tian  MRN: [id number]  Admission Date: 5/3/2019  Attending Physician: Adrien Magana MD   Primary Care Provider: Constantine Bird MD         Patient information was obtained from patient, relative(s) and ER records.     Subjective:     Principal Problem:Abdominal wall cellulitis    Chief Complaint:   Chief Complaint   Patient presents with    Wound Infection     abdominal left lower quad        HPI: Ms. Tian is a 67 yo lady who with pmhx of COPD, CHF, HTN, DM2, A fib, MI, morbid obesity, recurrent panniculitis who presented to the ED with erythema and swelling of left abd wall x 1 day. Daughter reports she was in her normal state of health until this morning where she starting having shaking chills and redness/pain/swelling around left abd wall. Known hernia on left. No other complaints. Patient sees wound care in outpatient setting. In the ED, she was febrile. Elevated WBC at 17 with neutrophilia. Lactate 2.6. Given 1.5L NS and vancomycin & clindamycin. Medicine consulted for admission.         Past Medical History:   Diagnosis Date    *Atrial flutter     Angina pectoris 9/18/2017    Anxiety     Arthritis     Asthma     Atrial fibrillation     Back pain     Cataract     OD    CHF (congestive heart failure)     COPD (chronic obstructive pulmonary disease)     Depression     Diabetes mellitus     Emphysema of lung     Heart failure     Hepatomegaly 2/3/2016    Hernia     History of MI (myocardial infarction) 1/19/2016    Hypercapnic respiratory failure, chronic 11/16/2016    Hyperlipidemia     Hypertension     Iron deficiency anemia 2/3/2016    Myocardial infarction     Obesity     Peripheral vascular disease     Pneumonia     Polyneuropathy     Retinal detachment     OS    Septic shock 4/23/2017    Skin ulcer 3/18/2017    Tobacco dependence     Type II or unspecified type diabetes mellitus  with neurological manifestations, not stated as uncontrolled(250.60)        Past Surgical History:   Procedure Laterality Date    BREAST BIOPSY Left 10 yrs ago    benign    CATARACT EXTRACTION Left     OS      SECTION      x2    EYE SURGERY      HYSTERECTOMY      partial    OOPHORECTOMY      one ovary conserved    RETINAL DETACHMENT SURGERY      buckle --OS    TONSILLECTOMY         Review of patient's allergies indicates:   Allergen Reactions    Dilaudid [hydromorphone] Anaphylaxis     Other reaction(s): Anaphylaxis  Other reaction(s): Unknown    Zyvox [linezolid in dextrose 5%] Shortness Of Breath       No current facility-administered medications on file prior to encounter.      Current Outpatient Medications on File Prior to Encounter   Medication Sig    albuterol (ACCUNEB) 0.63 mg/3 mL Nebu Take 3 mLs (0.63 mg total) by nebulization every 6 (six) hours as needed. Rescue    albuterol (PROVENTIL/VENTOLIN HFA) 90 mcg/actuation inhaler INHALE 2 PUFFS INTO THE LUNGS EVERY 6 (SIX) HOURS AS NEEDED FOR RESCUE    apixaban (ELIQUIS) 5 mg Tab Take 1 tablet (5 mg total) by mouth 2 (two) times daily.    ascorbic acid, vitamin C, (VITAMIN C) 500 MG tablet Take 1 tablet (500 mg total) by mouth every evening. (Patient taking differently: Take 1,000 mg by mouth 2 (two) times daily. )    atorvastatin (LIPITOR) 20 MG tablet Take 1 tablet (20 mg total) by mouth once daily.    BREO ELLIPTA 100-25 mcg/dose diskus inhaler INHALE 1 PUFF INTO THE LUNGS ONCE DAILY.    budesonide (PULMICORT) 0.5 mg/2 mL nebulizer solution Take 2 mLs (0.5 mg total) by nebulization once daily. Controller    clonazePAM (KLONOPIN) 0.5 MG tablet Take 1 tablet (0.5 mg total) by mouth 2 (two) times daily as needed for Anxiety. TAKE ONE TABLET BY MOUTH TWO TIMES A DAY AS NEEDED    fluticasone (FLONASE) 50 mcg/actuation nasal spray 1 spray (50 mcg total) by Each Nare route once daily.    furosemide (LASIX) 40 MG tablet TAKE 1 TABLET ONE  TIME DAILY  FOR  FLUID  OVERLOAD    gabapentin (NEURONTIN) 800 MG tablet Take 1 tablet (800 mg total) by mouth 3 (three) times daily.    HYDROcodone-acetaminophen (NORCO)  mg per tablet Take 1 tablet by mouth every 8 (eight) hours as needed for Pain (Do not take more than 3 a day. Do not mix with other narcotics.).    levalbuterol (XOPENEX) 0.63 mg/3 mL nebulizer solution INHALE THE CONTENTS OF 1 VIAL BY NEBULIZATION 4 times  DAILY.    DX: J43.9    lisinopril (PRINIVIL,ZESTRIL) 20 MG tablet Take 1 tablet (20 mg total) by mouth once daily.    loratadine (CLARITIN) 10 mg tablet Take 1 tablet (10 mg total) by mouth once daily.    metFORMIN (GLUCOPHAGE) 500 MG tablet TAKE 1 TABLET (500 MG TOTAL) BY MOUTH 2 (TWO) TIMES DAILY WITH MEALS.    metoprolol succinate (TOPROL-XL) 25 MG 24 hr tablet Take 0.5 tablets (12.5 mg total) by mouth once daily.    multivitamin (THERAGRAN) tablet Take 1 tablet by mouth once daily.    [START ON 6/6/2019] nicotine (NICODERM CQ) 14 mg/24 hr Place 1 patch onto the skin once daily. for 14 days    nicotine polacrilex 2 MG Lozg Take 1 lozenge (2 mg total) by mouth as needed.    nystatin (NYSTOP) powder Apply topically 2 (two) times daily.    nystatin-triamcinolone (MYCOLOG II) cream Apply topically 3 (three) times daily.    omega-3 acid ethyl esters (LOVAZA) 1 gram capsule 2 (two) times daily.     omeprazole (PRILOSEC) 20 MG capsule Take 1 capsule (20 mg total) by mouth once daily.    ondansetron (ZOFRAN-ODT) 8 MG TbDL Take 1 tablet (8 mg total) by mouth every 6 (six) hours as needed.    polyethylene glycol (GLYCOLAX) 17 gram/dose powder     potassium chloride SA (KLOR-CON M20) 20 MEQ tablet Take 1 tablet (20 mEq total) by mouth once daily.    senna-docusate 8.6-50 mg (PERICOLACE) 8.6-50 mg per tablet Take 1 tablet by mouth 2 (two) times daily as needed for Constipation.    spironolactone (ALDACTONE) 25 MG tablet Take 1 tablet (25 mg total) by mouth once daily.     traZODone (DESYREL) 100 MG tablet Take 1.5 tablets (150 mg total) by mouth every evening.     Family History     Problem Relation (Age of Onset)    Alcohol abuse Mother    Arthritis Father, Sister    Blindness Son    Cancer Brother    Crohn's disease Sister    Early death Sister (30)    Heart disease Father, Sister (32), Sister    No Known Problems Daughter        Tobacco Use    Smoking status: Current Some Day Smoker     Packs/day: 0.25     Years: 50.00     Pack years: 12.50     Types: Cigarettes     Start date: 1/19/1968    Smokeless tobacco: Never Used   Substance and Sexual Activity    Alcohol use: No     Alcohol/week: 0.0 oz     Frequency: Never    Drug use: No    Sexual activity: Not Currently     Review of Systems   Constitutional: Negative for activity change, chills and fever.   HENT: Negative for congestion, ear pain and nosebleeds.    Eyes: Negative for pain and discharge.   Respiratory: Negative for cough, chest tightness, shortness of breath, wheezing and stridor.    Cardiovascular: Negative for chest pain, palpitations and leg swelling.   Gastrointestinal: Negative for abdominal distention, abdominal pain, constipation, diarrhea, nausea and vomiting.   Endocrine: Negative for cold intolerance and heat intolerance.   Genitourinary: Negative for difficulty urinating, dysuria and hematuria.   Musculoskeletal: Negative for arthralgias, back pain and joint swelling.   Skin: Positive for color change, rash and wound. Negative for pallor.   Allergic/Immunologic: Negative for environmental allergies and immunocompromised state.   Neurological: Negative for dizziness, seizures and headaches.   Hematological: Negative for adenopathy. Does not bruise/bleed easily.   Psychiatric/Behavioral: Negative for agitation and behavioral problems.   All other systems reviewed and are negative.    Objective:     Vital Signs (Most Recent):  Temp: (!) 102 °F (38.9 °C) (05/04/19 0031)  Pulse: 95 (05/04/19 0004)  Resp: (!)  26 (05/03/19 2141)  BP: (!) 106/52 (05/04/19 0004)  SpO2: 99 % (05/04/19 0004) Vital Signs (24h Range):  Temp:  [101.2 °F (38.4 °C)-102 °F (38.9 °C)] 102 °F (38.9 °C)  Pulse:  [] 95  Resp:  [26] 26  SpO2:  [90 %-99 %] 99 %  BP: (106-173)/() 106/52     Weight: 121.1 kg (267 lb)  Body mass index is 40.6 kg/m².    Physical Exam   Constitutional: No distress.   Morbid obesity   HENT:   Head: Normocephalic and atraumatic.   Nose: Nose normal.   Mouth/Throat: No oropharyngeal exudate.   Eyes: Conjunctivae are normal. Right eye exhibits no discharge. Left eye exhibits no discharge. No scleral icterus.   Neck: Normal range of motion. Neck supple.   Cardiovascular: Normal rate, regular rhythm, normal heart sounds and intact distal pulses. Exam reveals no gallop and no friction rub.   No murmur heard.  Pulmonary/Chest: Effort normal and breath sounds normal. No stridor. No respiratory distress. She has no wheezes.   Abdominal: Soft. Bowel sounds are normal. She exhibits no distension. There is no tenderness. There is no rebound.   Obese abdomen. Left abdominal wall with erythema, tenderness, induration and warmth. No purulent drainage noted. No fluctuance.   Musculoskeletal: Normal range of motion. She exhibits no edema or deformity.   Neurological: She is alert.   Oriented to person only.    Skin: Skin is warm. Capillary refill takes less than 2 seconds. Rash noted. She is not diaphoretic. There is erythema.   Nursing note and vitals reviewed.       Significant Labs: All pertinent labs within the past 24 hours have been reviewed.    Significant Imaging: I have reviewed and interpreted all pertinent imaging results/findings within the past 24 hours.    Assessment/Plan:     * Abdominal wall cellulitis  Recurrent infections per history and chart review  - Vancomycin  - Clindamycin  - F/u blood cultures  - Tylenol for fevers    Other nonthrombocytopenic purpura  Noted. No issues.    Type 2 diabetes mellitus, without  long-term current use of insulin  A1c 5.6 in Jan 2019  - Low dose sliding scale      CHF (congestive heart failure)  No evidence of exacerbation    COPD (chronic obstructive pulmonary disease)  No evidence of exacerbation  - Bronchodilators PRN  - Home meds    Hypertension associated with diabetes  No acute issues  - Home meds    Chronic atrial fibrillation  Eliquis  Beta blocker      VTE Risk Mitigation (From admission, onward)    None             Elvin Ulloa MD  Department of Hospital Medicine   [facility]

## 2019-05-04 NOTE — CONSULTS
Nelly Tian [id number] is a 68 y.o. female who has been consulted for vancomycin dosing.    The patient has the following labs:     Date Creatinine (mg/dl)    BUN WBC Count   5/4/2019 Estimated Creatinine Clearance: 83.1 mL/min (based on SCr of 0.9 mg/dL). Lab Results   Component Value Date    BUN 19 05/03/2019     Lab Results   Component Value Date    WBC 17.00 (H) 05/03/2019        Current weight is 121.1 kg (267 lb)    (skin/soft tissue)    The patient received  2000 mg on 5/4 at 0026    The patient will be started on vancomycin at a dose of 1500 mg every 12 hours (12 mg/kg/dose).  The vancomycin trough has been ordered for 5/5 at 1530.      Patient will be followed by pharmacy for changes in renal function, toxicity, and efficacy.   Thank you for allowing us to participate in this patient's care.     Ammy Carrillo

## 2019-05-04 NOTE — ED PROVIDER NOTES
"Encounter Date: 5/3/2019    SCRIBE #1 NOTE: I, Adrienne Barrera , am scribing for, and in the presence of,  Dr. Magana  . I have scribed the entire note.       History     Chief Complaint   Patient presents with    Wound Infection     abdominal left lower quad     05/03/2019          The patient is a 68 y.o. female who is presenting per EMS for the gradual onset of worsening cellulitis to the LLQ of the abdomen. The pt reports that she "bumped the area" several weeks ago and endorses worsening erythema, tenderness, and swelling to the area. No exacerbating or mitigating factors. She endorses recent fevers, chill, and nausea as well. Pertinent PMhx includes A flutter, CHFM COPD< MI, PVD, septic shock, obesity. No pertinent past surgical hx.             The history is provided by the patient, medical records and the EMS personnel.     Review of patient's allergies indicates:   Allergen Reactions    Dilaudid [hydromorphone] Anaphylaxis     Other reaction(s): Anaphylaxis  Other reaction(s): Unknown    Zyvox [linezolid in dextrose 5%] Shortness Of Breath     Past Medical History:   Diagnosis Date    *Atrial flutter     Angina pectoris 9/18/2017    Anxiety     Arthritis     Asthma     Atrial fibrillation     Back pain     Cataract     OD    CHF (congestive heart failure)     COPD (chronic obstructive pulmonary disease)     Depression     Diabetes mellitus     Emphysema of lung     Heart failure     Hepatomegaly 2/3/2016    Hernia     History of MI (myocardial infarction) 1/19/2016    Hypercapnic respiratory failure, chronic 11/16/2016    Hyperlipidemia     Hypertension     Iron deficiency anemia 2/3/2016    Myocardial infarction     Obesity     Peripheral vascular disease     Pneumonia     Polyneuropathy     Retinal detachment     OS    Septic shock 4/23/2017    Skin ulcer 3/18/2017    Tobacco dependence     Type II 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, not " stated as uncontrolled(250.60)      Past Surgical History:   Procedure Laterality Date    BREAST BIOPSY Left 10 yrs ago    benign    CATARACT EXTRACTION Left     OS      SECTION      x2    EYE SURGERY      HYSTERECTOMY      partial    OOPHORECTOMY      one ovary conserved    RETINAL DETACHMENT SURGERY      buckle --OS    TONSILLECTOMY       Family History   Problem Relation Age of Onset    Arthritis Father     Heart disease Father     Early death Sister 30        heart disease    Heart disease Sister 32        MI    Cancer Brother         Lung cancer    No Known Problems Daughter     Blindness Son         due to accident//    Arthritis Sister     Heart disease Sister     Crohn's disease Sister     Alcohol abuse Mother     Breast cancer Neg Hx     Glaucoma Neg Hx     Macular degeneration Neg Hx     Retinal detachment Neg Hx     Amblyopia Neg Hx     Diabetes Neg Hx     Cataracts Neg Hx     Hypertension Neg Hx     Strabismus Neg Hx     Stroke Neg Hx     Thyroid disease Neg Hx      Social History     Tobacco Use    Smoking status: Current Some Day Smoker     Packs/day: 0.25     Years: 50.00     Pack years: 12.50     Types: Cigarettes     Start date: 1968    Smokeless tobacco: Never Used   Substance Use Topics    Alcohol use: No     Alcohol/week: 0.0 oz     Frequency: Never    Drug use: No     Review of Systems   Constitutional: Positive for chills and fever.   HENT: Negative for congestion.    Eyes: Negative for visual disturbance.   Respiratory: Negative for wheezing.    Cardiovascular: Negative for chest pain.   Gastrointestinal: Positive for abdominal pain and nausea.   Genitourinary: Negative for dysuria.   Musculoskeletal: Negative for joint swelling.   Skin: Positive for color change and wound. Negative for rash.        +tenderness    Neurological: Negative for syncope.   Hematological: Does not bruise/bleed easily.   Psychiatric/Behavioral: Negative for confusion.        Physical Exam     Initial Vitals [05/03/19 2136]   BP Pulse Resp Temp SpO2   -- -- -- -- 98 %      MAP       --         Physical Exam    Nursing note and vitals reviewed.  Constitutional: She appears well-nourished.   Appears ill. Obese. Tactile fever.    HENT:   Head: Normocephalic and atraumatic.   Eyes: Conjunctivae and EOM are normal.   Neck: Normal range of motion. Neck supple. No thyroid mass present.   Cardiovascular: Regular rhythm and normal heart sounds. Exam reveals no gallop and no friction rub.    No murmur heard.  Tachycardic    Pulmonary/Chest: She has no wheezes. She has no rhonchi. She has no rales.   Tachypnenic    Abdominal: Soft. Normal appearance and bowel sounds are normal. There is no tenderness.   Musculoskeletal: She exhibits edema.   Neurological: She is alert and oriented to person, place, and time. She has normal strength. No cranial nerve deficit or sensory deficit.   Skin: Skin is warm and dry. No rash noted. There is erythema.   Diffuse area of erythema to the L sided abdominal pannus. There are x 2 dressed open wounds.      Psychiatric: She has a normal mood and affect. Her speech is normal. Cognition and memory are normal.         ED Course   Procedures  Labs Reviewed - No data to display  EKG Readings: (Independently Interpreted)   Heart Rate: 90.   A fib with R axis deviation, low voltage Qrs. No STEMI.        Imaging Results    None          Medical Decision Making:   History:   Old Medical Records: I decided to obtain old medical records.  Clinical Tests:   Lab Tests: Ordered and Reviewed  The following lab test(s) were unremarkable: Ammonia  Medical Tests: Ordered and Reviewed  ED Management:  This patient was interviewed and examined emergently.  The patient is noted to have a large area of abdominal wall cellulitis with vital signs concerning progressing sepsis.  Sepsis bundle set initiated.  She was provided bolus hydration and initiated on broad-spectrum antibiotics  targeted towards cellulitis associated sepsis.  Lab significant for leukocytosis of 17,000 with a lactate elevation of 2.6.  Blood pressures stable. Case discussed with and accepted by the on-call hospitalist, Dr. Ulloa.  Patient and family member agreeable with the need for admission and further monitoring and she is transferred to a telemetry bed in guarded condition.            Scribe Attestation:   Scribe #1: I performed the above scribed service and the documentation accurately describes the services I performed. I attest to the accuracy of the note.    Attending Attestation:         Attending Critical Care:   Critical Care Times:   Direct Patient Care (initial evaluation, reassessments, and time considering the case)................................................................15 minutes.   Additional History from reviewing old medical records or taking additional history from the family, EMS, PCP, etc.......................5 minutes.   Ordering, Reviewing, and Interpreting Diagnostic Studies...............................................................................................................5 minutes.   Documentation..................................................................................................................................................................................5 minutes.   Consultation with other Physicians. .................................................................................................................................................5 minutes.   Consultation with the patient's family directly relating to the patient's condition, care, and DNR status (when patient unable)......5 minutes.   Other..................................................................................................................................................................................................5 minutes.    ==============================================================  · Total Critical Care Time - exclusive of procedural time: 45 minutes.  ==============================================================  Critical care was necessary to treat or prevent imminent or life-threatening deterioration of the following conditions: sepsis.   The following critical care procedures were done by me (see procedure notes): pulse oximetry.   Critical care was time spent personally by me on the following activities: obtaining history from patient or relative, examination of patient, review of old charts, ordering lab, x-rays, and/or EKG, development of treatment plan with patient or relative, ordering and performing treatments and interventions, evaluation of patient's response to treatment, discussions with primary provider, interpretation of cardiac measurements and re-evaluation of patient's conition.   Critical Care Condition: life-threatening                  Clinical Impression:       ICD-10-CM ICD-9-CM   1. Abdominal wall cellulitis L03.311 682.2   2. Tachycardia R00.0 785.0   3. Sepsis A41.9 038.9     995.91         Disposition:   Disposition: Admitted  Condition: Serious         I, Dr. Adrien Magana, personally performed the services described in this documentation. All medical record entries made by the scribe were at my direction and in my presence.  I have reviewed the chart and agree that the record reflects my personal performance and is accurate and complete. Adrien Magana MD.  6:14 AM 05/04/2019                 Adrien Magana MD  05/04/19 0614       Adrien Magana MD  05/04/19 0614

## 2019-05-04 NOTE — SUBJECTIVE & OBJECTIVE
Past Medical History:   Diagnosis Date    *Atrial flutter     Angina pectoris 2017    Anxiety     Arthritis     Asthma     Atrial fibrillation     Back pain     Cataract     OD    CHF (congestive heart failure)     COPD (chronic obstructive pulmonary disease)     Depression     Diabetes mellitus     Emphysema of lung     Heart failure     Hepatomegaly 2/3/2016    Hernia     History of MI (myocardial infarction) 2016    Hypercapnic respiratory failure, chronic 2016    Hyperlipidemia     Hypertension     Iron deficiency anemia 2/3/2016    Myocardial infarction     Obesity     Peripheral vascular disease     Pneumonia     Polyneuropathy     Retinal detachment     OS    Septic shock 2017    Skin ulcer 3/18/2017    Tobacco dependence     Type II 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, not stated as uncontrolled(250.60)        Past Surgical History:   Procedure Laterality Date    BREAST BIOPSY Left 10 yrs ago    benign    CATARACT EXTRACTION Left     OS      SECTION      x2    EYE SURGERY      HYSTERECTOMY      partial    OOPHORECTOMY      one ovary conserved    RETINAL DETACHMENT SURGERY      buckle --OS    TONSILLECTOMY         Review of patient's allergies indicates:   Allergen Reactions    Dilaudid [hydromorphone] Anaphylaxis     Other reaction(s): Anaphylaxis  Other reaction(s): Unknown    Zyvox [linezolid in dextrose 5%] Shortness Of Breath       No current facility-administered medications on file prior to encounter.      Current Outpatient Medications on File Prior to Encounter   Medication Sig    albuterol (ACCUNEB) 0.63 mg/3 mL Nebu Take 3 mLs (0.63 mg total) by nebulization every 6 (six) hours as needed. Rescue    albuterol (PROVENTIL/VENTOLIN HFA) 90 mcg/actuation inhaler INHALE 2 PUFFS INTO THE LUNGS EVERY 6 (SIX) HOURS AS NEEDED FOR RESCUE    apixaban (ELIQUIS) 5 mg Tab Take 1 tablet (5 mg total) by mouth  2 (two) times daily.    ascorbic acid, vitamin C, (VITAMIN C) 500 MG tablet Take 1 tablet (500 mg total) by mouth every evening. (Patient taking differently: Take 1,000 mg by mouth 2 (two) times daily. )    clonazePAM (KLONOPIN) 0.5 MG tablet Take 1 tablet (0.5 mg total) by mouth 2 (two) times daily as needed for Anxiety. TAKE ONE TABLET BY MOUTH TWO TIMES A DAY AS NEEDED    fluticasone (FLONASE) 50 mcg/actuation nasal spray 1 spray (50 mcg total) by Each Nare route once daily.    furosemide (LASIX) 40 MG tablet TAKE 1 TABLET ONE TIME DAILY  FOR  FLUID  OVERLOAD    gabapentin (NEURONTIN) 800 MG tablet Take 1 tablet (800 mg total) by mouth 3 (three) times daily.    lisinopril (PRINIVIL,ZESTRIL) 20 MG tablet Take 1 tablet (20 mg total) by mouth once daily.    metFORMIN (GLUCOPHAGE) 500 MG tablet TAKE 1 TABLET (500 MG TOTAL) BY MOUTH 2 (TWO) TIMES DAILY WITH MEALS.    metoprolol succinate (TOPROL-XL) 25 MG 24 hr tablet Take 0.5 tablets (12.5 mg total) by mouth once daily.    potassium chloride SA (KLOR-CON M20) 20 MEQ tablet Take 1 tablet (20 mEq total) by mouth once daily.    traZODone (DESYREL) 100 MG tablet Take 1.5 tablets (150 mg total) by mouth every evening.    atorvastatin (LIPITOR) 20 MG tablet Take 1 tablet (20 mg total) by mouth once daily.    BREO ELLIPTA 100-25 mcg/dose diskus inhaler INHALE 1 PUFF INTO THE LUNGS ONCE DAILY.    budesonide (PULMICORT) 0.5 mg/2 mL nebulizer solution Take 2 mLs (0.5 mg total) by nebulization once daily. Controller    HYDROcodone-acetaminophen (NORCO)  mg per tablet Take 1 tablet by mouth every 8 (eight) hours as needed for Pain (Do not take more than 3 a day. Do not mix with other narcotics.).    levalbuterol (XOPENEX) 0.63 mg/3 mL nebulizer solution INHALE THE CONTENTS OF 1 VIAL BY NEBULIZATION 4 times  DAILY.    DX: J43.9    loratadine (CLARITIN) 10 mg tablet Take 1 tablet (10 mg total) by mouth once daily.    multivitamin (THERAGRAN) tablet Take 1  tablet by mouth once daily.    [START ON 6/6/2019] nicotine (NICODERM CQ) 14 mg/24 hr Place 1 patch onto the skin once daily. for 14 days    nicotine polacrilex 2 MG Lozg Take 1 lozenge (2 mg total) by mouth as needed.    nystatin (NYSTOP) powder Apply topically 2 (two) times daily.    nystatin-triamcinolone (MYCOLOG II) cream Apply topically 3 (three) times daily.    omega-3 acid ethyl esters (LOVAZA) 1 gram capsule 2 (two) times daily.     omeprazole (PRILOSEC) 20 MG capsule Take 1 capsule (20 mg total) by mouth once daily.    ondansetron (ZOFRAN-ODT) 8 MG TbDL Take 1 tablet (8 mg total) by mouth every 6 (six) hours as needed.    polyethylene glycol (GLYCOLAX) 17 gram/dose powder     senna-docusate 8.6-50 mg (PERICOLACE) 8.6-50 mg per tablet Take 1 tablet by mouth 2 (two) times daily as needed for Constipation.    spironolactone (ALDACTONE) 25 MG tablet Take 1 tablet (25 mg total) by mouth once daily.     Family History     Problem Relation (Age of Onset)    Alcohol abuse Mother    Arthritis Father, Sister    Blindness Son    Cancer Brother    Crohn's disease Sister    Early death Sister (30)    Heart disease Father, Sister (32), Sister    No Known Problems Daughter        Tobacco Use    Smoking status: Current Some Day Smoker     Packs/day: 0.25     Years: 50.00     Pack years: 12.50     Types: Cigarettes     Start date: 1/19/1968    Smokeless tobacco: Never Used   Substance and Sexual Activity    Alcohol use: No     Alcohol/week: 0.0 oz     Frequency: Never    Drug use: No    Sexual activity: Not Currently     Review of Systems   Constitutional: Negative for activity change, chills and fever.   HENT: Negative for congestion, ear pain and nosebleeds.    Eyes: Negative for pain and discharge.   Respiratory: Negative for cough, chest tightness, shortness of breath, wheezing and stridor.    Cardiovascular: Negative for chest pain, palpitations and leg swelling.   Gastrointestinal: Negative for  abdominal distention, abdominal pain, constipation, diarrhea, nausea and vomiting.   Endocrine: Negative for cold intolerance and heat intolerance.   Genitourinary: Negative for difficulty urinating, dysuria and hematuria.   Musculoskeletal: Negative for arthralgias, back pain and joint swelling.   Skin: Positive for color change, rash and wound. Negative for pallor.   Allergic/Immunologic: Negative for environmental allergies and immunocompromised state.   Neurological: Negative for dizziness, seizures and headaches.   Hematological: Negative for adenopathy. Does not bruise/bleed easily.   Psychiatric/Behavioral: Negative for agitation and behavioral problems.   All other systems reviewed and are negative.    Objective:     Vital Signs (Most Recent):  Temp: 99.1 °F (37.3 °C) (05/04/19 0142)  Pulse: 66 (05/04/19 0235)  Resp: 15 (05/04/19 0235)  BP: (!) 111/47 (05/04/19 0142)  SpO2: (!) 94 % (05/04/19 0235) Vital Signs (24h Range):  Temp:  [99.1 °F (37.3 °C)-102 °F (38.9 °C)] 99.1 °F (37.3 °C)  Pulse:  [] 66  Resp:  [15-26] 15  SpO2:  [90 %-99 %] 94 %  BP: (106-173)/() 111/47     Weight: 121.1 kg (267 lb)  Body mass index is 40.6 kg/m².    Physical Exam    Constitutional: No distress.   Morbid obesity   HENT:   Head: Normocephalic and atraumatic.   Nose: Nose normal.   Mouth/Throat: No oropharyngeal exudate.   Eyes: Conjunctivae are normal. Right eye exhibits no discharge. Left eye exhibits no discharge. No scleral icterus.   Neck: Normal range of motion. Neck supple.   Cardiovascular: Normal rate, regular rhythm, normal heart sounds and intact distal pulses. Exam reveals no gallop and no friction rub.   No murmur heard.  Pulmonary/Chest: Effort normal and breath sounds normal. No stridor. No respiratory distress. She has no wheezes.   Abdominal: Soft. Bowel sounds are normal. She exhibits no distension. There is no tenderness. There is no rebound.   Obese abdomen. Left abdominal wall with erythema,  tenderness, induration and warmth. No purulent drainage noted. No fluctuance.   Musculoskeletal: Normal range of motion. She exhibits no edema or deformity.   Neurological: She is alert.   Oriented to person only.    Skin: Skin is warm. Capillary refill takes less than 2 seconds. Rash noted. She is not diaphoretic. There is erythema.   Nursing note and vitals reviewed.     Significant Labs: All pertinent labs within the past 24 hours have been reviewed.    Significant Imaging: I have reviewed and interpreted all pertinent imaging results/findings within the past 24 hours.

## 2019-05-04 NOTE — SUBJECTIVE & OBJECTIVE
Interval History: Patient reported chills in the morning. She had a low grade temperature at that time. Repeat blood cultures     Review of Systems   Constitutional: Positive for chills and fever.   HENT: Negative for congestion and dental problem.    Eyes: Negative for discharge and itching.   Respiratory: Negative for apnea and chest tightness.    Cardiovascular: Negative for chest pain and leg swelling.   Gastrointestinal: Negative for abdominal distention and abdominal pain.   Endocrine: Negative for cold intolerance and heat intolerance.   Allergic/Immunologic: Negative for environmental allergies.   Neurological: Negative for dizziness and facial asymmetry.   Psychiatric/Behavioral: Negative for agitation.     Objective:     Vital Signs (Most Recent):  Temp: 97.9 °F (36.6 °C) (05/04/19 1503)  Pulse: 95 (05/04/19 1503)  Resp: 18 (05/04/19 1503)  BP: (!) 107/52 (05/04/19 1503)  SpO2: (!) 92 % (05/04/19 1503) Vital Signs (24h Range):  Temp:  [97.5 °F (36.4 °C)-102 °F (38.9 °C)] 97.9 °F (36.6 °C)  Pulse:  [] 95  Resp:  [15-26] 18  SpO2:  [90 %-99 %] 92 %  BP: ()/() 107/52     Weight: 121.1 kg (267 lb)  Body mass index is 40.6 kg/m².    Intake/Output Summary (Last 24 hours) at 5/4/2019 1646  Last data filed at 5/4/2019 1325  Gross per 24 hour   Intake 100 ml   Output 400 ml   Net -300 ml      Physical Exam   Constitutional: She is oriented to person, place, and time. No distress.   Morbid obesity   HENT:   Head: Normocephalic and atraumatic.   Nose: Nose normal.   Mouth/Throat: No oropharyngeal exudate.   Eyes: Conjunctivae are normal. Right eye exhibits no discharge. Left eye exhibits no discharge. No scleral icterus.   Neck: Normal range of motion. Neck supple.   Cardiovascular: Normal rate, regular rhythm, normal heart sounds and intact distal pulses. Exam reveals no gallop and no friction rub.   No murmur heard.  Pulmonary/Chest: Effort normal and breath sounds normal. No stridor. No  respiratory distress. She has no wheezes.   Abdominal: Soft. Bowel sounds are normal. She exhibits no distension. There is no tenderness. There is no rebound.   Obese abdomen. Left abdominal wall with erythema, tenderness, induration and warmth. No purulent drainage noted. No fluctuance.   Musculoskeletal: Normal range of motion. She exhibits no edema or deformity.   Neurological: She is alert and oriented to person, place, and time.   Skin: Skin is warm. Capillary refill takes less than 2 seconds. Rash noted. She is not diaphoretic. There is erythema.   Nursing note and vitals reviewed.    Significant Labs:   A1C:   Recent Labs   Lab 01/19/19 2232   HGBA1C 5.6     ABGs:   Recent Labs   Lab 05/04/19  0239   PH 7.383   PCO2 49.6*   HCO3 29.5*   POCSATURATED 94*   BE 4     Bilirubin:   Recent Labs   Lab 05/03/19  2303   BILITOT 0.4     Blood Culture: No results for input(s): LABBLOO in the last 48 hours.  BMP:   Recent Labs   Lab 05/04/19  0451   *      K 4.1      CO2 29   BUN 20   CREATININE 0.8   CALCIUM 9.2     CBC:   Recent Labs   Lab 05/03/19 2304 05/04/19  0451   WBC 17.00* 18.50*   HGB 15.2 13.0   HCT 47.6 40.5    197     CMP:   Recent Labs   Lab 05/03/19 2303 05/04/19  0451    139   K 4.6 4.1    103   CO2 26 29   * 115*   BUN 19 20   CREATININE 0.9 0.8   CALCIUM 10.3 9.2   PROT 7.8  --    ALBUMIN 3.8  --    BILITOT 0.4  --    ALKPHOS 112  --    AST 25  --    ALT 27  --    ANIONGAP 15 7*   EGFRNONAA >60 >60     Cardiac Markers:   Recent Labs   Lab 05/03/19  2304   *     Coagulation: No results for input(s): PT, INR, APTT in the last 48 hours.  Lactic Acid:   Recent Labs   Lab 05/03/19 2253 05/04/19 0451   LACTATE 2.6* 1.3     Lipase: No results for input(s): LIPASE in the last 48 hours.  Lipid Panel: No results for input(s): CHOL, HDL, LDLCALC, TRIG, CHOLHDL in the last 48 hours.  Magnesium: No results for input(s): MG in the last 48 hours.  Pathology  Results  (Last 10 years)    None        POCT Glucose:   Recent Labs   Lab 05/04/19  0558 05/04/19  1145   POCTGLUCOSE 115* 96     Prealbumin: No results for input(s): PREALBUMIN in the last 48 hours.  Respiratory Culture: No results for input(s): GSRESP, RESPIRATORYC in the last 48 hours.  Troponin: No results for input(s): TROPONINI in the last 48 hours.  TSH:   Recent Labs   Lab 05/03/19  2310   TSH 0.405     Significant Imaging: I have reviewed and interpreted all pertinent imaging results/findings within the past 24 hours.

## 2019-05-04 NOTE — PROGRESS NOTES
05/04/19 1614   Discharge Assessment   Assessment Type Discharge Planning Assessment   Confirmed/corrected address and phone number on facesheet? Yes   Assessment information obtained from? Patient   Prior to hospitilization cognitive status: Alert/Oriented   Prior to hospitalization functional status: Assistive Equipment   Current cognitive status: Alert/Oriented   Current Functional Status: Assistive Equipment   Lives With other relative(s)  (1 adult grandchild (age 18) and 1 minor grandchild (age 16))   Able to Return to Prior Arrangements yes   Is patient able to care for self after discharge? Yes   Patient's perception of discharge disposition home or selfcare   Readmission Within the Last 30 Days no previous admission in last 30 days   Patient currently being followed by outpatient case management? No   Patient currently receives any other outside agency services? No   Equipment Currently Used at Home bedside commode;rollator;walker, rolling;oxygen;cane, straight   Do you have any problems affording any of your prescribed medications? No   Is the patient taking medications as prescribed? yes   Does the patient have transportation home? Yes   Transportation Anticipated family or friend will provide   Does the patient receive services at the [facility]? No   Discharge Plan A Home with family   DME Needed Upon Discharge  none   Patient/Family in Agreement with Plan yes

## 2019-05-04 NOTE — ASSESSMENT & PLAN NOTE
- Patient filled Clonazepam 0.5 mg #60 on 4/27  - Was held secondary to encephalopathy  - Will resume slowly.

## 2019-05-04 NOTE — SUBJECTIVE & OBJECTIVE
Past Medical History:   Diagnosis Date    *Atrial flutter     Angina pectoris 2017    Anxiety     Arthritis     Asthma     Atrial fibrillation     Back pain     Cataract     OD    CHF (congestive heart failure)     COPD (chronic obstructive pulmonary disease)     Depression     Diabetes mellitus     Emphysema of lung     Heart failure     Hepatomegaly 2/3/2016    Hernia     History of MI (myocardial infarction) 2016    Hypercapnic respiratory failure, chronic 2016    Hyperlipidemia     Hypertension     Iron deficiency anemia 2/3/2016    Myocardial infarction     Obesity     Peripheral vascular disease     Pneumonia     Polyneuropathy     Retinal detachment     OS    Septic shock 2017    Skin ulcer 3/18/2017    Tobacco dependence     Type II 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, not stated as uncontrolled(250.60)        Past Surgical History:   Procedure Laterality Date    BREAST BIOPSY Left 10 yrs ago    benign    CATARACT EXTRACTION Left     OS      SECTION      x2    EYE SURGERY      HYSTERECTOMY      partial    OOPHORECTOMY      one ovary conserved    RETINAL DETACHMENT SURGERY      buckle --OS    TONSILLECTOMY         Review of patient's allergies indicates:   Allergen Reactions    Dilaudid [hydromorphone] Anaphylaxis     Other reaction(s): Anaphylaxis  Other reaction(s): Unknown    Zyvox [linezolid in dextrose 5%] Shortness Of Breath       No current facility-administered medications on file prior to encounter.      Current Outpatient Medications on File Prior to Encounter   Medication Sig    albuterol (ACCUNEB) 0.63 mg/3 mL Nebu Take 3 mLs (0.63 mg total) by nebulization every 6 (six) hours as needed. Rescue    albuterol (PROVENTIL/VENTOLIN HFA) 90 mcg/actuation inhaler INHALE 2 PUFFS INTO THE LUNGS EVERY 6 (SIX) HOURS AS NEEDED FOR RESCUE    apixaban (ELIQUIS) 5 mg Tab Take 1 tablet (5 mg total) by mouth  2 (two) times daily.    ascorbic acid, vitamin C, (VITAMIN C) 500 MG tablet Take 1 tablet (500 mg total) by mouth every evening. (Patient taking differently: Take 1,000 mg by mouth 2 (two) times daily. )    atorvastatin (LIPITOR) 20 MG tablet Take 1 tablet (20 mg total) by mouth once daily.    BREO ELLIPTA 100-25 mcg/dose diskus inhaler INHALE 1 PUFF INTO THE LUNGS ONCE DAILY.    budesonide (PULMICORT) 0.5 mg/2 mL nebulizer solution Take 2 mLs (0.5 mg total) by nebulization once daily. Controller    clonazePAM (KLONOPIN) 0.5 MG tablet Take 1 tablet (0.5 mg total) by mouth 2 (two) times daily as needed for Anxiety. TAKE ONE TABLET BY MOUTH TWO TIMES A DAY AS NEEDED    fluticasone (FLONASE) 50 mcg/actuation nasal spray 1 spray (50 mcg total) by Each Nare route once daily.    furosemide (LASIX) 40 MG tablet TAKE 1 TABLET ONE TIME DAILY  FOR  FLUID  OVERLOAD    gabapentin (NEURONTIN) 800 MG tablet Take 1 tablet (800 mg total) by mouth 3 (three) times daily.    HYDROcodone-acetaminophen (NORCO)  mg per tablet Take 1 tablet by mouth every 8 (eight) hours as needed for Pain (Do not take more than 3 a day. Do not mix with other narcotics.).    levalbuterol (XOPENEX) 0.63 mg/3 mL nebulizer solution INHALE THE CONTENTS OF 1 VIAL BY NEBULIZATION 4 times  DAILY.    DX: J43.9    lisinopril (PRINIVIL,ZESTRIL) 20 MG tablet Take 1 tablet (20 mg total) by mouth once daily.    loratadine (CLARITIN) 10 mg tablet Take 1 tablet (10 mg total) by mouth once daily.    metFORMIN (GLUCOPHAGE) 500 MG tablet TAKE 1 TABLET (500 MG TOTAL) BY MOUTH 2 (TWO) TIMES DAILY WITH MEALS.    metoprolol succinate (TOPROL-XL) 25 MG 24 hr tablet Take 0.5 tablets (12.5 mg total) by mouth once daily.    multivitamin (THERAGRAN) tablet Take 1 tablet by mouth once daily.    [START ON 6/6/2019] nicotine (NICODERM CQ) 14 mg/24 hr Place 1 patch onto the skin once daily. for 14 days    nicotine polacrilex 2 MG Lozg Take 1 lozenge (2 mg total)  by mouth as needed.    nystatin (NYSTOP) powder Apply topically 2 (two) times daily.    nystatin-triamcinolone (MYCOLOG II) cream Apply topically 3 (three) times daily.    omega-3 acid ethyl esters (LOVAZA) 1 gram capsule 2 (two) times daily.     omeprazole (PRILOSEC) 20 MG capsule Take 1 capsule (20 mg total) by mouth once daily.    ondansetron (ZOFRAN-ODT) 8 MG TbDL Take 1 tablet (8 mg total) by mouth every 6 (six) hours as needed.    polyethylene glycol (GLYCOLAX) 17 gram/dose powder     potassium chloride SA (KLOR-CON M20) 20 MEQ tablet Take 1 tablet (20 mEq total) by mouth once daily.    senna-docusate 8.6-50 mg (PERICOLACE) 8.6-50 mg per tablet Take 1 tablet by mouth 2 (two) times daily as needed for Constipation.    spironolactone (ALDACTONE) 25 MG tablet Take 1 tablet (25 mg total) by mouth once daily.    traZODone (DESYREL) 100 MG tablet Take 1.5 tablets (150 mg total) by mouth every evening.     Family History     Problem Relation (Age of Onset)    Alcohol abuse Mother    Arthritis Father, Sister    Blindness Son    Cancer Brother    Crohn's disease Sister    Early death Sister (30)    Heart disease Father, Sister (32), Sister    No Known Problems Daughter        Tobacco Use    Smoking status: Current Some Day Smoker     Packs/day: 0.25     Years: 50.00     Pack years: 12.50     Types: Cigarettes     Start date: 1/19/1968    Smokeless tobacco: Never Used   Substance and Sexual Activity    Alcohol use: No     Alcohol/week: 0.0 oz     Frequency: Never    Drug use: No    Sexual activity: Not Currently     Review of Systems   Constitutional: Negative for activity change, chills and fever.   HENT: Negative for congestion, ear pain and nosebleeds.    Eyes: Negative for pain and discharge.   Respiratory: Negative for cough, chest tightness, shortness of breath, wheezing and stridor.    Cardiovascular: Negative for chest pain, palpitations and leg swelling.   Gastrointestinal: Negative for abdominal  distention, abdominal pain, constipation, diarrhea, nausea and vomiting.   Endocrine: Negative for cold intolerance and heat intolerance.   Genitourinary: Negative for difficulty urinating, dysuria and hematuria.   Musculoskeletal: Negative for arthralgias, back pain and joint swelling.   Skin: Positive for color change, rash and wound. Negative for pallor.   Allergic/Immunologic: Negative for environmental allergies and immunocompromised state.   Neurological: Negative for dizziness, seizures and headaches.   Hematological: Negative for adenopathy. Does not bruise/bleed easily.   Psychiatric/Behavioral: Negative for agitation and behavioral problems.   All other systems reviewed and are negative.    Objective:     Vital Signs (Most Recent):  Temp: (!) 102 °F (38.9 °C) (05/04/19 0031)  Pulse: 95 (05/04/19 0004)  Resp: (!) 26 (05/03/19 2141)  BP: (!) 106/52 (05/04/19 0004)  SpO2: 99 % (05/04/19 0004) Vital Signs (24h Range):  Temp:  [101.2 °F (38.4 °C)-102 °F (38.9 °C)] 102 °F (38.9 °C)  Pulse:  [] 95  Resp:  [26] 26  SpO2:  [90 %-99 %] 99 %  BP: (106-173)/() 106/52     Weight: 121.1 kg (267 lb)  Body mass index is 40.6 kg/m².    Physical Exam   Constitutional: No distress.   Morbid obesity   HENT:   Head: Normocephalic and atraumatic.   Nose: Nose normal.   Mouth/Throat: No oropharyngeal exudate.   Eyes: Conjunctivae are normal. Right eye exhibits no discharge. Left eye exhibits no discharge. No scleral icterus.   Neck: Normal range of motion. Neck supple.   Cardiovascular: Normal rate, regular rhythm, normal heart sounds and intact distal pulses. Exam reveals no gallop and no friction rub.   No murmur heard.  Pulmonary/Chest: Effort normal and breath sounds normal. No stridor. No respiratory distress. She has no wheezes.   Abdominal: Soft. Bowel sounds are normal. She exhibits no distension. There is no tenderness. There is no rebound.   Obese abdomen. Left abdominal wall with erythema, tenderness,  induration and warmth. No purulent drainage noted. No fluctuance.   Musculoskeletal: Normal range of motion. She exhibits no edema or deformity.   Neurological: She is alert.   Oriented to person only.    Skin: Skin is warm. Capillary refill takes less than 2 seconds. Rash noted. She is not diaphoretic. There is erythema.   Nursing note and vitals reviewed.       Significant Labs: All pertinent labs within the past 24 hours have been reviewed.    Significant Imaging: I have reviewed and interpreted all pertinent imaging results/findings within the past 24 hours.

## 2019-05-04 NOTE — NURSING
Pt returned to floor from CT. Tele monitoring placed, A fib on monitor. VSS. IVF and IV abx infusing. Pt is sleepy but will awaken when prompted and will answer questions appropriately. All meds and POC reviewed with pt, verbalizes understanding, call light in reach.

## 2019-05-05 LAB
ANION GAP SERPL CALC-SCNC: 8 MMOL/L (ref 8–16)
BASOPHILS # BLD AUTO: 0 K/UL (ref 0–0.2)
BASOPHILS NFR BLD: 0.2 % (ref 0–1.9)
BUN SERPL-MCNC: 11 MG/DL (ref 8–23)
CALCIUM SERPL-MCNC: 9.1 MG/DL (ref 8.7–10.5)
CHLORIDE SERPL-SCNC: 100 MMOL/L (ref 95–110)
CO2 SERPL-SCNC: 27 MMOL/L (ref 23–29)
CREAT SERPL-MCNC: 0.6 MG/DL (ref 0.5–1.4)
CRP SERPL-MCNC: 200.8 MG/L (ref 0–8.2)
DIFFERENTIAL METHOD: ABNORMAL
EOSINOPHIL # BLD AUTO: 0.2 K/UL (ref 0–0.5)
EOSINOPHIL NFR BLD: 1.3 % (ref 0–8)
ERYTHROCYTE [DISTWIDTH] IN BLOOD BY AUTOMATED COUNT: 18.8 % (ref 11.5–14.5)
EST. GFR  (AFRICAN AMERICAN): >60 ML/MIN/1.73 M^2
EST. GFR  (NON AFRICAN AMERICAN): >60 ML/MIN/1.73 M^2
GLUCOSE SERPL-MCNC: 99 MG/DL (ref 70–110)
HCT VFR BLD AUTO: 38.7 % (ref 37–48.5)
HGB BLD-MCNC: 12.4 G/DL (ref 12–16)
LYMPHOCYTES # BLD AUTO: 1.5 K/UL (ref 1–4.8)
LYMPHOCYTES NFR BLD: 11.1 % (ref 18–48)
MCH RBC QN AUTO: 29.2 PG (ref 27–31)
MCHC RBC AUTO-ENTMCNC: 32.2 G/DL (ref 32–36)
MCV RBC AUTO: 91 FL (ref 82–98)
MONOCYTES # BLD AUTO: 1.1 K/UL (ref 0.3–1)
MONOCYTES NFR BLD: 8.3 % (ref 4–15)
NEUTROPHILS # BLD AUTO: 10.4 K/UL (ref 1.8–7.7)
NEUTROPHILS NFR BLD: 79.1 % (ref 38–73)
PLATELET # BLD AUTO: 149 K/UL (ref 150–350)
PMV BLD AUTO: 9.2 FL (ref 9.2–12.9)
POCT GLUCOSE: 101 MG/DL (ref 70–110)
POCT GLUCOSE: 154 MG/DL (ref 70–110)
POCT GLUCOSE: 98 MG/DL (ref 70–110)
POTASSIUM SERPL-SCNC: 4 MMOL/L (ref 3.5–5.1)
PROCALCITONIN SERPL IA-MCNC: 0.56 NG/ML
RBC # BLD AUTO: 4.25 M/UL (ref 4–5.4)
SODIUM SERPL-SCNC: 135 MMOL/L (ref 136–145)
VANCOMYCIN TROUGH SERPL-MCNC: 10.6 UG/ML (ref 10–22)
WBC # BLD AUTO: 13.1 K/UL (ref 3.9–12.7)

## 2019-05-05 PROCEDURE — 97161 PT EVAL LOW COMPLEX 20 MIN: CPT

## 2019-05-05 PROCEDURE — 86140 C-REACTIVE PROTEIN: CPT

## 2019-05-05 PROCEDURE — 63600175 PHARM REV CODE 636 W HCPCS: Performed by: HOSPITALIST

## 2019-05-05 PROCEDURE — 94640 AIRWAY INHALATION TREATMENT: CPT

## 2019-05-05 PROCEDURE — 36415 COLL VENOUS BLD VENIPUNCTURE: CPT

## 2019-05-05 PROCEDURE — 80202 ASSAY OF VANCOMYCIN: CPT

## 2019-05-05 PROCEDURE — 25000003 PHARM REV CODE 250: Performed by: INTERNAL MEDICINE

## 2019-05-05 PROCEDURE — G8979 MOBILITY GOAL STATUS: HCPCS | Mod: CJ

## 2019-05-05 PROCEDURE — G8988 SELF CARE GOAL STATUS: HCPCS | Mod: CK

## 2019-05-05 PROCEDURE — 12000002 HC ACUTE/MED SURGE SEMI-PRIVATE ROOM

## 2019-05-05 PROCEDURE — 97165 OT EVAL LOW COMPLEX 30 MIN: CPT

## 2019-05-05 PROCEDURE — 25000003 PHARM REV CODE 250: Performed by: HOSPITALIST

## 2019-05-05 PROCEDURE — 97535 SELF CARE MNGMENT TRAINING: CPT

## 2019-05-05 PROCEDURE — 94761 N-INVAS EAR/PLS OXIMETRY MLT: CPT

## 2019-05-05 PROCEDURE — G8987 SELF CARE CURRENT STATUS: HCPCS | Mod: CK

## 2019-05-05 PROCEDURE — 25000242 PHARM REV CODE 250 ALT 637 W/ HCPCS: Performed by: INTERNAL MEDICINE

## 2019-05-05 PROCEDURE — S0077 INJECTION, CLINDAMYCIN PHOSP: HCPCS | Performed by: INTERNAL MEDICINE

## 2019-05-05 PROCEDURE — 27000221 HC OXYGEN, UP TO 24 HOURS

## 2019-05-05 PROCEDURE — 80048 BASIC METABOLIC PNL TOTAL CA: CPT

## 2019-05-05 PROCEDURE — 85025 COMPLETE CBC W/AUTO DIFF WBC: CPT

## 2019-05-05 PROCEDURE — 25000003 PHARM REV CODE 250: Performed by: NURSE PRACTITIONER

## 2019-05-05 PROCEDURE — G8989 SELF CARE D/C STATUS: HCPCS | Mod: CK

## 2019-05-05 PROCEDURE — 84145 PROCALCITONIN (PCT): CPT

## 2019-05-05 PROCEDURE — 99900035 HC TECH TIME PER 15 MIN (STAT)

## 2019-05-05 PROCEDURE — G8978 MOBILITY CURRENT STATUS: HCPCS | Mod: CL

## 2019-05-05 RX ORDER — FLUCONAZOLE 100 MG/1
200 TABLET ORAL DAILY
Status: DISCONTINUED | OUTPATIENT
Start: 2019-05-05 | End: 2019-05-08 | Stop reason: HOSPADM

## 2019-05-05 RX ORDER — HYDROCODONE BITARTRATE AND ACETAMINOPHEN 5; 325 MG/1; MG/1
1 TABLET ORAL EVERY 6 HOURS PRN
Status: DISCONTINUED | OUTPATIENT
Start: 2019-05-05 | End: 2019-05-08 | Stop reason: HOSPADM

## 2019-05-05 RX ORDER — IBUPROFEN 600 MG/1
600 TABLET ORAL 4 TIMES DAILY
Status: COMPLETED | OUTPATIENT
Start: 2019-05-05 | End: 2019-05-07

## 2019-05-05 RX ADMIN — HYDROCODONE BITARTRATE AND ACETAMINOPHEN 1 TABLET: 5; 325 TABLET ORAL at 09:05

## 2019-05-05 RX ADMIN — ENOXAPARIN SODIUM 40 MG: 100 INJECTION SUBCUTANEOUS at 09:05

## 2019-05-05 RX ADMIN — FLUCONAZOLE 200 MG: 100 TABLET ORAL at 09:05

## 2019-05-05 RX ADMIN — CLINDAMYCIN IN 5 PERCENT DEXTROSE 600 MG: 12 INJECTION, SOLUTION INTRAVENOUS at 11:05

## 2019-05-05 RX ADMIN — VANCOMYCIN HYDROCHLORIDE 1500 MG: 1 INJECTION, POWDER, FOR SOLUTION INTRAVENOUS at 03:05

## 2019-05-05 RX ADMIN — METOPROLOL SUCCINATE 12.5 MG: 25 TABLET, EXTENDED RELEASE ORAL at 09:05

## 2019-05-05 RX ADMIN — CLONAZEPAM 0.5 MG: 0.5 TABLET ORAL at 09:05

## 2019-05-05 RX ADMIN — FLUTICASONE FUROATE AND VILANTEROL TRIFENATATE 1 PUFF: 100; 25 POWDER RESPIRATORY (INHALATION) at 08:05

## 2019-05-05 RX ADMIN — IBUPROFEN 600 MG: 600 TABLET ORAL at 09:05

## 2019-05-05 RX ADMIN — ACETAMINOPHEN 650 MG: 325 TABLET, FILM COATED ORAL at 09:05

## 2019-05-05 RX ADMIN — ATORVASTATIN CALCIUM 20 MG: 20 TABLET, FILM COATED ORAL at 09:05

## 2019-05-05 RX ADMIN — PANTOPRAZOLE SODIUM 40 MG: 40 TABLET, DELAYED RELEASE ORAL at 09:05

## 2019-05-05 RX ADMIN — CLINDAMYCIN IN 5 PERCENT DEXTROSE 600 MG: 12 INJECTION, SOLUTION INTRAVENOUS at 05:05

## 2019-05-05 RX ADMIN — IPRATROPIUM BROMIDE AND ALBUTEROL SULFATE 3 ML: .5; 3 SOLUTION RESPIRATORY (INHALATION) at 10:05

## 2019-05-05 RX ADMIN — CLINDAMYCIN IN 5 PERCENT DEXTROSE 600 MG: 12 INJECTION, SOLUTION INTRAVENOUS at 06:05

## 2019-05-05 NOTE — ASSESSMENT & PLAN NOTE
- Resolved. Likely related to sepsis at presentation.  - CT head did not show any acute abnormality.

## 2019-05-05 NOTE — PROGRESS NOTES
05/05/19 0817   PRE-TX-O2   O2 Device (Oxygen Therapy) nasal cannula   $ Is the patient on Low Flow Oxygen? Yes   Flow (L/min) 2   SpO2 (!) 92 %   Pulse Oximetry Type Intermittent   $ Pulse Oximetry - Multiple Charge Pulse Oximetry - Multiple   Pulse 67   Resp 18   Aerosol Therapy   $ Aerosol Therapy Charges PRN treatment not required   Respiratory Treatment Status (SVN) PRN treatment not required     No PRN treatments needed at this time,and pt had daily Breo.

## 2019-05-05 NOTE — PT/OT/SLP EVAL
Physical Therapy Evaluation    Patient Name:  Nelly Tian   MRN:  [id number]    Recommendations:     Discharge Recommendations:  home   Discharge Equipment Recommendations: none(TBD)   Barriers to discharge: None    Assessment:     Nelly Tian is a 68 y.o. female admitted with a medical diagnosis of Abdominal wall cellulitis.  She presents with the following impairments/functional limitations:  weakness, impaired endurance obesity.    Rehab Prognosis: Good; patient would benefit from acute skilled PT services to address these deficits and reach maximum level of function.    Recent Surgery: * No surgery found *      Plan:     During this hospitalization, patient to be seen 6 x/week to address the identified rehab impairments via gait training, therapeutic activities and progress toward the following goals:    · Plan of Care Expires:  05/20/19    Subjective     Chief Complaint: pain  Patient/Family Comments/goals: Return to previous NIKA.  Pain/Comfort:  · Pain Rating 1: 5/10(left hip)  · Pain Rating Post-Intervention 1: 5/10    Patients cultural, spiritual, Shinto conflicts given the current situation: no    Living Environment:  In house with daughter.  Prior to admission, patients level of function was using RW and cane PRN.  Equipment used at home: cane, straight, walker, rolling.  DME owned (not currently used): rolling walker and single point cane.  Upon discharge, patient will have assistance from daughters.    Objective:     Communicated with RN prior to session.  Patient found supine with    upon PT entry to room.    General Precautions: Standard, fall   Orthopedic Precautions:N/A   Braces: N/A     Exams:  · Cognitive Exam:  Patient is oriented to Person, Place, Time and Situation  · Sensation:    · -       Intact  · Skin Integrity/Edema:      · -       Edema: Severe left hip area  · RLE ROM: WFL  · RLE Strength: WFL  · LLE ROM: WFL  · LLE Strength: WFL    Functional Mobility:  · Bed Mobility:      · Rolling Right: contact guard assistance  · Transfers:     · Sit to Stand:  minimum assistance with rolling walker  · Gait: 50' with RW with Valente.       Therapeutic Activities and Exercises:       AM-PAC 6 CLICK MOBILITY  Total Score:      Patient left sitting on EOB with all lines intact, call button in reach and daughters present.    GOALS:   Multidisciplinary Problems     Physical Therapy Goals        Problem: Physical Therapy Goal    Goal Priority Disciplines Outcome Goal Variances Interventions   Physical Therapy Goal     PT, PT/OT      Description:  1.Pt will be independent with bed mobility and transfers.  2.Pt will ambulate 250' with AD with CGA.                    History:     Past Medical History:   Diagnosis Date    *Atrial flutter     Angina pectoris 2017    Anxiety     Arthritis     Asthma     Atrial fibrillation     Back pain     Cataract     OD    CHF (congestive heart failure)     COPD (chronic obstructive pulmonary disease)     Depression     Diabetes mellitus     Emphysema of lung     Heart failure     Hepatomegaly 2/3/2016    Hernia     History of MI (myocardial infarction) 2016    Hypercapnic respiratory failure, chronic 2016    Hyperlipidemia     Hypertension     Iron deficiency anemia 2/3/2016    Myocardial infarction     Obesity     Peripheral vascular disease     Pneumonia     Polyneuropathy     Retinal detachment     OS    Septic shock 2017    Skin ulcer 3/18/2017    Tobacco dependence     Type II 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, not stated as uncontrolled(250.60)        Past Surgical History:   Procedure Laterality Date    BREAST BIOPSY Left 10 yrs ago    benign    CATARACT EXTRACTION Left     OS      SECTION      x2    EYE SURGERY      HYSTERECTOMY      partial    OOPHORECTOMY      one ovary conserved    RETINAL DETACHMENT SURGERY      buckle --OS    TONSILLECTOMY         Time  Tracking:     PT Received On: 05/05/19  PT Start Time: 1024     PT Stop Time: 1038  PT Total Time (min): 14 min     Billable Minutes: Evaluation 14      Andrews Orta, PT  05/05/2019

## 2019-05-05 NOTE — PT/OT/SLP EVAL
"Occupational Therapy   Evaluation and Discharge Note    Name: Nelly Tian  MRN: [id number]  Admitting Diagnosis:  Abdominal wall cellulitis      Recommendations:     Discharge Recommendations:    Discharge Equipment Recommendations:  tub bench  Barriers to discharge:  None    Assessment:     Nelly Tian is a 68 y.o. female with a medical diagnosis of Abdominal wall cellulitis. At this time, patient is functioning at their prior level of function and does not require further acute OT services.     Plan:     During this hospitalization, patient does not require further acute OT services.  Please re-consult if situation changes.    · Plan of Care Reviewed with: patient    Subjective     Chief Complaint: " This catheter is driving me crazy. I want it out!"  Patient/Family Comments/goals: To get a bedside commode in her room.    Occupational Profile:  Living Environment: Pt lives with her daughter and 16 & 18 year old grandkids in a [facility] with 0 SARAH with a tub shower.  Previous level of function: (I)/Mod I with all ADL's using a rollator for mobility. She uses oxygen at night.  Roles and Routines: Pt does simple meal prep and household tasks.  Equipment Used at home:  3-in-1 commode, oxygen, cane, straight, rollator  Assistance upon Discharge: Family will assist pt at home.    Pain/Comfort:  Pain Rating 1: 5/10  Location - Side 1: Left  Location 1: abdomen  Pain Addressed 1: Pre-medicate for activity, Nurse notified, Reposition, Distraction, Cessation of Activity  Pain Rating Post-Intervention 1: (no c/o pain)    Patients cultural, spiritual, Yazidism conflicts given the current situation:      Objective:     Communicated with: nurse Fran prior to session.  Patient found supine with ortiz catheter, telemetry upon OT entry to room.    General Precautions: Standard, fall(skin)   Orthopedic Precautions:N/A   Braces: N/A     Occupational Performance:    Bed Mobility:    · Patient completed Scooting/Bridging with " supervision  · Patient completed Supine to Sit with supervision  · Patient completed Sit to Supine with supervision    Functional Mobility/Transfers:  · Patient completed Sit <> Stand Transfer with stand by assistance  with  bed rail   · Functional Mobility: Pt took 6 steps at bedside with SBA and no assistive device with no LOB.    Activities of Daily Living:  · Feeding:  independence with HOB raised  · Grooming: modified independence to wash face and hands with items in reach seated EOB  · Upper Body Dressing: set-up at EOB to don/doff gown    · Lower Body Dressing: stand by assistance to don/doff slippers seated EOB    Cognitive/Visual Perceptual:  Cognitive/Psychosocial Skills:     -       Oriented to: Person, Place, Time and Situation   -       Follows Commands/attention:Follows multistep  commands  -       Communication: clear/fluent  -       Safety awareness/insight to disability: intact   -       Mood/Affect/Coping skills/emotional control: Appropriate to situation and Cooperative  Visual/Perceptual:      -Intact      Physical Exam:  Postural examination/scapula alignment:    -       Rounded shoulders  -       Forward head  Skin integrity: erythema and a few small bleeding wounds on L aspect of abdomen  Upper Extremity Range of Motion:     -       Right Upper Extremity: WNL  -       Left Upper Extremity: WNL  Upper Extremity Strength:    -       Right Upper Extremity: WNL  -       Left Upper Extremity: WNL   Strength:    -       Right Upper Extremity: WNL  -       Left Upper Extremity: WNL  Fine Motor Coordination:    -       Intact  Gross motor coordination:   WFL    AMPAC 6 Click ADL:  AMPAC Total Score: 19    Treatment & Education:  OT ed pt on OT role & discharge recommendations.  Pt had many nursing care requests/demands (ortiz removed, wounds bandaged, IV disconnected) and OT relayed them to nurseFran.  OT ed pt on fall risk and strongly advised pt to call for help for all OOB mobility.  OT ed  pt on keeping HOB raised and sitting up in chair as pt will tolerate to counteract effects of bedrest.    Education:    Patient left HOB elevated with all lines intact, call button in reach and Fran, nurse notified    GOALS:   Multidisciplinary Problems     Occupational Therapy Goals     Not on file                History:     Past Medical History:   Diagnosis Date    *Atrial flutter     Angina pectoris 2017    Anxiety     Arthritis     Asthma     Atrial fibrillation     Back pain     Cataract     OD    CHF (congestive heart failure)     COPD (chronic obstructive pulmonary disease)     Depression     Diabetes mellitus     Emphysema of lung     Heart failure     Hepatomegaly 2/3/2016    Hernia     History of MI (myocardial infarction) 2016    Hypercapnic respiratory failure, chronic 2016    Hyperlipidemia     Hypertension     Iron deficiency anemia 2/3/2016    Myocardial infarction     Obesity     Peripheral vascular disease     Pneumonia     Polyneuropathy     Retinal detachment     OS    Septic shock 2017    Skin ulcer 3/18/2017    Tobacco dependence     Type II 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, not stated as uncontrolled(250.60)          Past Surgical History:   Procedure Laterality Date    BREAST BIOPSY Left 10 yrs ago    benign    CATARACT EXTRACTION Left     OS      SECTION      x2    EYE SURGERY      HYSTERECTOMY      partial    OOPHORECTOMY      one ovary conserved    RETINAL DETACHMENT SURGERY      buckle --OS    TONSILLECTOMY         Time Tracking:     OT Date of Treatment: 19  OT Start Time: 08  OT Stop Time: 0910  OT Total Time (min): 26 min    Billable Minutes:Evaluation 12  Self Care/Home Management 14    MARIA L Galo  2019

## 2019-05-05 NOTE — NURSING
MD made aware that patient had 200 cc of urine after 2l total bolus. Harding placed per MD orders to monitor output. Harding placed while maintaining sterile technique.800 cc of urine received dark yellow.

## 2019-05-05 NOTE — PROGRESS NOTES
05/05/19 1428   PT G-Codes   Functional Assessment Tool Used FIM   Functional Limitation Mobility: Walking and moving around   Mobility: Walking and Moving Around Current Status () CL   Mobility: Walking and Moving Around Goal Status () CJ   PT Evaluation   History (PT Eval Complexity) (LOW) no personal factors and/or comorbidities   Examination of Body Systems (PT Eval Complexity) (LOW) 1-2 elements   Clinical Presentation (PT Evaluation Complexity) (LOW) stable   Clinical Decision Making (PT Evaluation Complexity) (LOW) low complexity   $PT Evaluation EVAL, LOW COMPLEXITY

## 2019-05-05 NOTE — PLAN OF CARE
05/04/19 2009   Patient Assessment/Suction   Level of Consciousness (AVPU) alert   Respiratory Effort Unlabored   Expansion/Accessory Muscles/Retractions no use of accessory muscles   All Lung Fields Breath Sounds clear;diminished   Rhythm/Pattern, Respiratory unlabored   Cough Frequency infrequent   Cough Type nonproductive   PRE-TX-O2   O2 Device (Oxygen Therapy) nasal cannula   Flow (L/min) 2   Oxygen Concentration (%) 28   SpO2 (!) 93 %   Pulse Oximetry Type Intermittent   $ Pulse Oximetry - Multiple Charge Pulse Oximetry - Multiple   Aerosol Therapy   $ Aerosol Therapy Charges PRN treatment not required   Ready to Wean/Extubation Screen   FIO2<=50 (chart decimal) 0.28

## 2019-05-05 NOTE — PROGRESS NOTES
[facility] Hospital Medicine  Progress Note    Patient Name: Nelly Tian  MRN: [id number]  Patient Class: IP- Inpatient   Admission Date: 5/3/2019  Length of Stay: 1 days  Attending Physician: Baljinder Judge MD  Primary Care Provider: Constantine Bird MD    Subjective:     Principal Problem:Abdominal wall cellulitis    HPI:  Ms. Tian is a 69 yo lady who with pmhx of COPD, CHF, HTN, DM2, A fib, MI, morbid obesity, recurrent panniculitis who presented to the ED with erythema and swelling of left abd wall x 1 day. Daughter reports she was in her normal state of health until this morning where she starting having shaking chills and redness/pain/swelling around left abd wall. Known hernia on left. No other complaints. Patient sees wound care in outpatient setting. In the ED, she was febrile. Elevated WBC at 17 with neutrophilia. Lactate 2.6. Given 1.5L NS and vancomycin & clindamycin. Medicine was consulted for admission.     Hospital Course:  No notes on file    Interval History: No acute overnight events. Patient reports a mild improvement in symptoms.     Review of Systems   Constitutional: Positive for chills and fever.   HENT: Negative for congestion and dental problem.    Eyes: Negative for discharge and itching.   Respiratory: Negative for apnea and chest tightness.    Cardiovascular: Negative for chest pain and leg swelling.   Gastrointestinal: Negative for abdominal distention and abdominal pain.   Endocrine: Negative for cold intolerance and heat intolerance.   Allergic/Immunologic: Negative for environmental allergies.   Neurological: Negative for dizziness and facial asymmetry.   Psychiatric/Behavioral: Negative for agitation.     Objective:     Vital Signs (Most Recent):  Temp: 98 °F (36.7 °C) (05/05/19 1129)  Pulse: 64 (05/05/19 1129)  Resp: 16 (05/05/19 1129)  BP: (!) 116/57 (05/05/19 1129)  SpO2: (!) 94 % (05/05/19 1129) Vital Signs (24h Range):  Temp:  [97 °F (36.1 °C)-102.3 °F  (39.1 °C)] 98 °F (36.7 °C)  Pulse:  [64-95] 64  Resp:  [16-20] 16  SpO2:  [88 %-95 %] 94 %  BP: ()/(47-61) 116/57     Weight: 121.1 kg (267 lb)  Body mass index is 40.6 kg/m².    Intake/Output Summary (Last 24 hours) at 5/5/2019 1333  Last data filed at 5/4/2019 2009  Gross per 24 hour   Intake 290 ml   Output 1000 ml   Net -710 ml      Physical Exam   Constitutional: She is oriented to person, place, and time. No distress.   Morbid obesity   HENT:   Head: Normocephalic and atraumatic.   Nose: Nose normal.   Mouth/Throat: No oropharyngeal exudate.   Eyes: Conjunctivae are normal. Right eye exhibits no discharge. Left eye exhibits no discharge. No scleral icterus.   Neck: Normal range of motion. Neck supple.   Cardiovascular: Normal rate, regular rhythm, normal heart sounds and intact distal pulses. Exam reveals no gallop and no friction rub.   No murmur heard.  Pulmonary/Chest: Effort normal and breath sounds normal. No stridor. No respiratory distress. She has no wheezes.   Abdominal: Soft. Bowel sounds are normal. She exhibits no distension. There is no tenderness. There is no rebound.   Obese abdomen. Left abdominal wall with erythema, tenderness, induration and warmth. No purulent drainage noted. No fluctuance.   Musculoskeletal: Normal range of motion. She exhibits no edema or deformity.   Neurological: She is alert and oriented to person, place, and time.   Skin: Skin is warm. Capillary refill takes less than 2 seconds. Rash noted. She is not diaphoretic. There is erythema.   Nursing note and vitals reviewed.    Significant Labs:   A1C:   Recent Labs   Lab 01/19/19  2232   HGBA1C 5.6     ABGs:   Recent Labs   Lab 05/04/19  0239   PH 7.383   PCO2 49.6*   HCO3 29.5*   POCSATURATED 94*   BE 4     Bilirubin:   Recent Labs   Lab 05/03/19  2303   BILITOT 0.4     Blood Culture:   Recent Labs   Lab 05/03/19  2253 05/03/19  2310 05/04/19  1140   LABBLOO No Growth to date  No Growth to date No Growth to date  No  Growth to date No Growth to date  No Growth to date     BMP:   Recent Labs   Lab 05/05/19  0452   GLU 99   *   K 4.0      CO2 27   BUN 11   CREATININE 0.6   CALCIUM 9.1     CBC:   Recent Labs   Lab 05/03/19  2304 05/04/19 0451 05/05/19 0452   WBC 17.00* 18.50* 13.10*   HGB 15.2 13.0 12.4   HCT 47.6 40.5 38.7    197 149*     CMP:   Recent Labs   Lab 05/03/19  2303 05/04/19  0451 05/05/19  0452    139 135*   K 4.6 4.1 4.0    103 100   CO2 26 29 27   * 115* 99   BUN 19 20 11   CREATININE 0.9 0.8 0.6   CALCIUM 10.3 9.2 9.1   PROT 7.8  --   --    ALBUMIN 3.8  --   --    BILITOT 0.4  --   --    ALKPHOS 112  --   --    AST 25  --   --    ALT 27  --   --    ANIONGAP 15 7* 8   EGFRNONAA >60 >60 >60     Cardiac Markers:   Recent Labs   Lab 05/03/19  2304   *     Coagulation: No results for input(s): PT, INR, APTT in the last 48 hours.  Lactic Acid:   Recent Labs   Lab 05/03/19 2253 05/04/19 0451   LACTATE 2.6* 1.3     Lipase: No results for input(s): LIPASE in the last 48 hours.  Lipid Panel: No results for input(s): CHOL, HDL, LDLCALC, TRIG, CHOLHDL in the last 48 hours.  Magnesium: No results for input(s): MG in the last 48 hours.  Pathology Results  (Last 10 years)    None        POCT Glucose:   Recent Labs   Lab 05/04/19  1748 05/04/19  2339 05/05/19  1116   POCTGLUCOSE 91 92 154*     Prealbumin: No results for input(s): PREALBUMIN in the last 48 hours.  Respiratory Culture: No results for input(s): GSRESP, RESPIRATORYC in the last 48 hours.  Troponin: No results for input(s): TROPONINI in the last 48 hours.  TSH:   Recent Labs   Lab 05/03/19  2310   TSH 0.405     Significant Imaging: I have reviewed and interpreted all pertinent imaging results/findings within the past 24 hours.    Assessment/Plan:      * Abdominal wall cellulitis  - Recurrent infections per history and chart review  - Continue Vancomycin and Clindamycin - white count has improved compared to  admission.  - F/u blood cultures - no growth so far  - CT scan of the abdominal wall could not be done due to habitus. Will order a soft tissue ultrasound to look for a possible abscess.  - ID following, appreciate recommendations.  - PRN Acetaminophen.    Chronic atrial fibrillation  - Continue beta blocker, will continue to hold Eliquis in case a drainable abscess is identified.    Chronic prescription opiate use  Norco  #90 filled on 4/27    Chronically on benzodiazepine therapy  - Patient filled Clonazepam 0.5 mg #60 on 4/27  - Was held secondary to encephalopathy  - Will resume slowly.    Encephalopathy, metabolic  - Resolved. Likely related to sepsis at presentation.  - CT head did not show any acute abnormality.    Other nonthrombocytopenic purpura  Noted. No issues.    Type 2 diabetes mellitus, without long-term current use of insulin  - A1c 5.6 in Jan 2019  - Low dose sliding scale      CHF (congestive heart failure)  - No evidence of exacerbation    COPD (chronic obstructive pulmonary disease)  - No evidence of exacerbation  - Bronchodilators PRN  - Home meds    Hypertension associated with diabetes  - No acute issues  - Home meds    Long term current use of anticoagulant therapy  - Eliquis held for him till an abscess is ruled out.        VTE Risk Mitigation (From admission, onward)        Ordered     enoxaparin injection 40 mg  Every 12 hours      05/04/19 1718     IP VTE HIGH RISK PATIENT  Once      05/04/19 0156        Baljinder Judge MD  Department of Hospital Medicine   [facility]

## 2019-05-05 NOTE — PROGRESS NOTES
Date: 5/5/2019   Nelly Tian [id number] is a 68 y.o. female who has been consulted for vancomycin dosing.    The patient has the following labs:     Creatinine (mg/dl)    WBC Count   Serum creatinine: 0.6 mg/dL 05/05/19 0452  Estimated creatinine clearance: 124.7 mL/min Lab Results   Component Value Date    WBC 13.10 (H) 05/05/2019            Current weight is 121.1 kg (267 lb)    Pt being treated with vancomycin for abdominal wound infection.    Vancomycin 1500mg every 12 hours is ordered. Vancomycin trough 10.6 on 5/5/19 at 1421. Vancomycin trough goal is 10-15.    Continue Vancomycin 1500mg every 12 hours. Next Vancomycin trough due on 5/7/19 at 0230.     Carri Manrique, MichaelD

## 2019-05-05 NOTE — ASSESSMENT & PLAN NOTE
- Recurrent infections per history and chart review  - Continue Vancomycin and Clindamycin - white count has improved compared to admission.  - F/u blood cultures - no growth so far  - CT scan of the abdominal wall could not be done due to habitus. Will order a soft tissue ultrasound to look for a possible abscess.  - ID following, appreciate recommendations.  - PRN Acetaminophen.

## 2019-05-05 NOTE — SUBJECTIVE & OBJECTIVE
Interval History: No acute overnight events. Patient reports a mild improvement in symptoms.     Review of Systems   Constitutional: Positive for chills and fever.   HENT: Negative for congestion and dental problem.    Eyes: Negative for discharge and itching.   Respiratory: Negative for apnea and chest tightness.    Cardiovascular: Negative for chest pain and leg swelling.   Gastrointestinal: Negative for abdominal distention and abdominal pain.   Endocrine: Negative for cold intolerance and heat intolerance.   Allergic/Immunologic: Negative for environmental allergies.   Neurological: Negative for dizziness and facial asymmetry.   Psychiatric/Behavioral: Negative for agitation.     Objective:     Vital Signs (Most Recent):  Temp: 98 °F (36.7 °C) (05/05/19 1129)  Pulse: 64 (05/05/19 1129)  Resp: 16 (05/05/19 1129)  BP: (!) 116/57 (05/05/19 1129)  SpO2: (!) 94 % (05/05/19 1129) Vital Signs (24h Range):  Temp:  [97 °F (36.1 °C)-102.3 °F (39.1 °C)] 98 °F (36.7 °C)  Pulse:  [64-95] 64  Resp:  [16-20] 16  SpO2:  [88 %-95 %] 94 %  BP: ()/(47-61) 116/57     Weight: 121.1 kg (267 lb)  Body mass index is 40.6 kg/m².    Intake/Output Summary (Last 24 hours) at 5/5/2019 1333  Last data filed at 5/4/2019 2009  Gross per 24 hour   Intake 290 ml   Output 1000 ml   Net -710 ml      Physical Exam   Constitutional: She is oriented to person, place, and time. No distress.   Morbid obesity   HENT:   Head: Normocephalic and atraumatic.   Nose: Nose normal.   Mouth/Throat: No oropharyngeal exudate.   Eyes: Conjunctivae are normal. Right eye exhibits no discharge. Left eye exhibits no discharge. No scleral icterus.   Neck: Normal range of motion. Neck supple.   Cardiovascular: Normal rate, regular rhythm, normal heart sounds and intact distal pulses. Exam reveals no gallop and no friction rub.   No murmur heard.  Pulmonary/Chest: Effort normal and breath sounds normal. No stridor. No respiratory distress. She has no wheezes.    Abdominal: Soft. Bowel sounds are normal. She exhibits no distension. There is no tenderness. There is no rebound.   Obese abdomen. Left abdominal wall with erythema, tenderness, induration and warmth. No purulent drainage noted. No fluctuance.   Musculoskeletal: Normal range of motion. She exhibits no edema or deformity.   Neurological: She is alert and oriented to person, place, and time.   Skin: Skin is warm. Capillary refill takes less than 2 seconds. Rash noted. She is not diaphoretic. There is erythema.   Nursing note and vitals reviewed.    Significant Labs:   A1C:   Recent Labs   Lab 01/19/19 2232   HGBA1C 5.6     ABGs:   Recent Labs   Lab 05/04/19  0239   PH 7.383   PCO2 49.6*   HCO3 29.5*   POCSATURATED 94*   BE 4     Bilirubin:   Recent Labs   Lab 05/03/19  2303   BILITOT 0.4     Blood Culture:   Recent Labs   Lab 05/03/19  2253 05/03/19  2310 05/04/19  1140   LABBLOO No Growth to date  No Growth to date No Growth to date  No Growth to date No Growth to date  No Growth to date     BMP:   Recent Labs   Lab 05/05/19  0452   GLU 99   *   K 4.0      CO2 27   BUN 11   CREATININE 0.6   CALCIUM 9.1     CBC:   Recent Labs   Lab 05/03/19 2304 05/04/19  0451 05/05/19  0452   WBC 17.00* 18.50* 13.10*   HGB 15.2 13.0 12.4   HCT 47.6 40.5 38.7    197 149*     CMP:   Recent Labs   Lab 05/03/19 2303 05/04/19  0451 05/05/19  0452    139 135*   K 4.6 4.1 4.0    103 100   CO2 26 29 27   * 115* 99   BUN 19 20 11   CREATININE 0.9 0.8 0.6   CALCIUM 10.3 9.2 9.1   PROT 7.8  --   --    ALBUMIN 3.8  --   --    BILITOT 0.4  --   --    ALKPHOS 112  --   --    AST 25  --   --    ALT 27  --   --    ANIONGAP 15 7* 8   EGFRNONAA >60 >60 >60     Cardiac Markers:   Recent Labs   Lab 05/03/19  2304   *     Coagulation: No results for input(s): PT, INR, APTT in the last 48 hours.  Lactic Acid:   Recent Labs   Lab 05/03/19  2253 05/04/19  0451   LACTATE 2.6* 1.3     Lipase: No results  for input(s): LIPASE in the last 48 hours.  Lipid Panel: No results for input(s): CHOL, HDL, LDLCALC, TRIG, CHOLHDL in the last 48 hours.  Magnesium: No results for input(s): MG in the last 48 hours.  Pathology Results  (Last 10 years)    None        POCT Glucose:   Recent Labs   Lab 05/04/19  1748 05/04/19  2339 05/05/19  1116   POCTGLUCOSE 91 92 154*     Prealbumin: No results for input(s): PREALBUMIN in the last 48 hours.  Respiratory Culture: No results for input(s): GSRESP, RESPIRATORYC in the last 48 hours.  Troponin: No results for input(s): TROPONINI in the last 48 hours.  TSH:   Recent Labs   Lab 05/03/19  2310   TSH 0.405     Significant Imaging: I have reviewed and interpreted all pertinent imaging results/findings within the past 24 hours.

## 2019-05-05 NOTE — PLAN OF CARE
Problem: Adult Inpatient Plan of Care  Goal: Plan of Care Review  Outcome: Ongoing (interventions implemented as appropriate)  Pt remains free from injury. abd US done this shift, pending results. Wounds noted @ abd mass. Pt up with stand by assistance. Tolerating meals. Blood sugar monitored. IV abx infused per order. Controlled Afib per tele monitored. Lovenox administered per vte prevention. o2 @ 2L NC with sats > 92%. Afebrile through shift, VSS. Bed locked and low. Pt educated on use of call light. Nurse hourly rounding to ensure pt safety.

## 2019-05-05 NOTE — PLAN OF CARE
Problem: Adult Inpatient Plan of Care  Goal: Plan of Care Review  Outcome: Ongoing (interventions implemented as appropriate)  Patient remained free from falls and injury.Pt remained free from new skin breakdown.Antibiotics administered per MD orders.Pt remained low grade fever.Infectious disease consulted. CT of Abdomen was ordered and it was unable to be performed due to pt's size. Md made aware and he said if she continues to spike temps she may have to be transferred to [place] [facility] for the CT of abdomen. Telemetry running afib/sinus rhythm. 1000 NS bolus given this am. Blood cultures ordered. Harding placed per MD order due to multiple bolus with little urine output.  Pt's cbgs check ac and pt's cbgs were within normal limits.Bed in low position. Side rails up x2. Wheels locked. Call light in reach.Will continue to monitor and intervene prn.

## 2019-05-05 NOTE — CONSULTS
Consult Note  Infectious Disease    Reason for Consult:      HPI: Nelly Tian 68-year-old female with past medical history of COPD,chronic hypoxemic respiratory failure on O2, CAD,CHF, A. Fib, MI, HTN, NIDDM ,morbid obesity, chronic pain on Norco,anxiety, depression, insomnia, anemia follows with hematology oncology, recurrent paniculitis- treated 3 mo ago,  who came in with the same.    Patient came in complaining of swelling of left abdominal pain, one-day duration, progressively worse, associated erythema in the left abdominal wall,shaking chills. She was found to have leukocytosis, neutrophilia, elevated lactate. She was treated for sepsis and panic colitis with IV fluids, vancomycin and clindamycin.    Antibiotics (From admission, onward)    Start     Stop Route Frequency Ordered    05/05/19 0300  vancomycin (VANCOCIN) 1,500 mg in dextrose 5 % 250 mL IVPB      -- IV Every 12 hours (non-standard times) 05/04/19 1511    05/04/19 0600  clindamycin 600 MG/50 ML D5W 600 mg/50 mL IVPB 600 mg      -- IV Every 6 hours (non-standard times) 05/04/19 0100          Review of patient's allergies indicates:   Allergen Reactions    Dilaudid [hydromorphone] Anaphylaxis     Other reaction(s): Anaphylaxis  Other reaction(s): Unknown    Zyvox [linezolid in dextrose 5%] Shortness Of Breath     Past Medical History:   Diagnosis Date    *Atrial flutter     Angina pectoris 9/18/2017    Anxiety     Arthritis     Asthma     Atrial fibrillation     Back pain     Cataract     OD    CHF (congestive heart failure)     COPD (chronic obstructive pulmonary disease)     Depression     Diabetes mellitus     Emphysema of lung     Heart failure     Hepatomegaly 2/3/2016    Hernia     History of MI (myocardial infarction) 1/19/2016    Hypercapnic respiratory failure, chronic 11/16/2016    Hyperlipidemia     Hypertension     Iron deficiency anemia 2/3/2016    Myocardial infarction     Obesity     Peripheral vascular  disease     Pneumonia     Polyneuropathy     Retinal detachment     OS    Septic shock 2017    Skin ulcer 3/18/2017    Tobacco dependence     Type II 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, not stated as uncontrolled(250.60)      Past Surgical History:   Procedure Laterality Date    BREAST BIOPSY Left 10 yrs ago    benign    CATARACT EXTRACTION Left     OS      SECTION      x2    EYE SURGERY      HYSTERECTOMY      partial    OOPHORECTOMY      one ovary conserved    RETINAL DETACHMENT SURGERY      buckle --OS    TONSILLECTOMY       Social History     Socioeconomic History    Marital status:      Spouse name: Not on file    Number of children: Not on file    Years of education: Not on file    Highest education level: Not on file   Occupational History    Not on file   Social Needs    Financial resource strain: Not on file    Food insecurity:     Worry: Not on file     Inability: Not on file    Transportation needs:     Medical: Not on file     Non-medical: Not on file   Tobacco Use    Smoking status: Current Some Day Smoker     Packs/day: 0.25     Years: 50.00     Pack years: 12.50     Types: Cigarettes     Start date: 1968    Smokeless tobacco: Never Used   Substance and Sexual Activity    Alcohol use: No     Alcohol/week: 0.0 oz     Frequency: Never    Drug use: No    Sexual activity: Not Currently   Lifestyle    Physical activity:     Days per week: Not on file     Minutes per session: Not on file    Stress: Not on file   Relationships    Social connections:     Talks on phone: Not on file     Gets together: Not on file     Attends Hoahaoism service: Not on file     Active member of club or organization: Not on file     Attends meetings of clubs or organizations: Not on file     Relationship status: Not on file   Other Topics Concern    Not on file   Social History Narrative    Not on file     Family History   Problem Relation  Age of Onset    Arthritis Father     Heart disease Father     Early death Sister 30        heart disease    Heart disease Sister 32        MI    Cancer Brother         Lung cancer    No Known Problems Daughter     Blindness Son         due to accident//    Arthritis Sister     Heart disease Sister     Crohn's disease Sister     Alcohol abuse Mother     Breast cancer Neg Hx     Glaucoma Neg Hx     Macular degeneration Neg Hx     Retinal detachment Neg Hx     Amblyopia Neg Hx     Diabetes Neg Hx     Cataracts Neg Hx     Hypertension Neg Hx     Strabismus Neg Hx     Stroke Neg Hx     Thyroid disease Neg Hx            Review of Systems:   + chills, fever, sweats  No change in vision, loss of vision or diplopia  No sinus congestion, purulent nasal discharge, post nasal drip or facial pain  No pain in mouth or throat. No problems with teeth, gums  No chest pain, palpitations, syncope  No cough, sputum production, pleurisy, hemoptysis, shortness of breath, dyspnea on exertion, or snoring/apnea  No nausea, vomiting, diarrhea, constipation, blood in stool, or focal abd pain  No dysuria, hematuria, strangury, retention,  nocturia, prostatism,   No vaginal discharge, perineal pain, dysfunctional uterine bleeding or risk factor for STD  No swelling of joints, redness of joints, injuries, or new focal pain  + Left ant abd warm, pain, erythema  No unusual headaches, dizziness, vertigo, numbness, paresthesias, neuropathy, falls  No anxiety, depression, substance abuse, sleep disturbance  No diabetes, thyroid, hypogonadal conditions  No bleeding, lymphadenopathy, anemia, malignancy, unusual bruising    EXAM & DIAGNOSTICS REVIEWED:   Vitals:     Temp:  [97.5 °F (36.4 °C)-102 °F (38.9 °C)]   Temp: 98.3 °F (36.8 °C) (05/04/19 1746)  Pulse: 95 (05/04/19 1503)  Resp: 18 (05/04/19 1503)  BP: (!) 107/52 (05/04/19 1503)  SpO2: (!) 93 % (05/04/19 2009)    Intake/Output Summary (Last 24 hours) at 5/4/2019 2114  Last  data filed at 5/4/2019 2009  Gross per 24 hour   Intake 780 ml   Output 1400 ml   Net -620 ml       General:  In NAD. Looks comfortable Alert and attentive, cooperative  Eyes:  Anicteric, PERRL, EOMI  ENT:  Mouth w/ pink MMM, no lesions/exudate, good dentition  Neck:  Trachea midline, supple, no adenopathy appreciated  Lungs: Clear. Diminished due to body habitus  Heart:  RRR, no gallop/murmur noted  Abd:  soft, NT, ND, normal BS, no masses/organomegaly appreciated.  :  Voids/Harding draining yellow urine, clear  Musc:  Joints without effusion, swelling,  erythema, synovitis,   Skin:  Generally warm, dry, normal for color. No rashes. No palmar or plantar    lesions. No subungual petechiae  Wound: Left ant abd wall has erythema,warm, tender prior scars and little ulcer  Neuro: AAOx3, speech clear, moves all extrems equally  Extrem: petechia on right leg, chronic changes  VAD:    Lines/Tubes/Drains:    General Labs reviewed:  Recent Labs   Lab 05/04/19  0451   WBC 18.50*   RBC 4.49   HGB 13.0   HCT 40.5      MCV 90   MCH 29.0   MCHC 32.1     Recent Labs   Lab 05/03/19  2303 05/04/19  0451   CALCIUM 10.3 9.2   PROT 7.8  --     139   K 4.6 4.1   CO2 26 29    103   BUN 19 20   CREATININE 0.9 0.8   ALKPHOS 112  --    ALT 27  --    AST 25  --    BILITOT 0.4  --            Micro:  Microbiology Results (last 7 days)     Procedure Component Value Units Date/Time    Blood culture x two cultures. Draw prior to antibiotics. [[id number]] Collected:  05/03/19 2310    Order Status:  Completed Specimen:  Blood from Peripheral, Left  Hand Updated:  05/04/19 1745     Blood Culture, Routine No Growth to date    Narrative:       Aerobic and anaerobic    Blood culture x two cultures. Draw prior to antibiotics. [[id number]] Collected:  05/03/19 2253    Order Status:  Completed Specimen:  Blood from Peripheral, Right  Hand Updated:  05/04/19 1745     Blood Culture, Routine No Growth to date    Narrative:       Aerobic and  anaerobic    Blood culture [[id number]] Collected:  05/04/19 1140    Order Status:  Sent Specimen:  Blood Updated:  05/04/19 1439    Blood culture [[id number]] Collected:  05/04/19 1140    Order Status:  Sent Specimen:  Blood Updated:  05/04/19 1439    Blood culture [[id number]]     Order Status:  Canceled Specimen:  Blood     Blood culture [[id number]]     Order Status:  Canceled Specimen:  Blood         Imaging Reviewed:   CXR--Bandlike atelectasis versus pneumonitis at the left lung base.  Please correlate clinically   CT could not be done      IMPRESSION & PLAN   1. Panniculitis   CRP 24              Fungal infection of groin    Recommendation:   Cont vanc and Clinda  Add diflucan  Monitor CRP  Us to look for abscess?

## 2019-05-05 NOTE — PLAN OF CARE
Problem: Adult Inpatient Plan of Care  Goal: Patient-Specific Goal (Individualization)  Outcome: Ongoing (interventions implemented as appropriate)  Pt remained free from fall or injury throughout the shift.  PIV in place w/ IV abx infused as ordered.  Pt had a T-max of 102.3 resolved with PRN tylenol.  Tele monitor in use and pt noted to be running a-fib during the night.  ID consult pending L lateral abd wound/infection remains closed with no drainage noted.  Eliquis on hold for potential sx or I&D and Lovenox given for VTE prevention.  Pt is tolerating PO with BG monitored and no SSI required.  POC was discussed w/ pt and an understanding was verbalized.  Bedside rails are raised x2 wi/ call bell and bedside table within reach.  Nurse rounded hourly to ensure pt safety.

## 2019-05-06 LAB
ANION GAP SERPL CALC-SCNC: 9 MMOL/L (ref 8–16)
BASOPHILS # BLD AUTO: 0 K/UL (ref 0–0.2)
BASOPHILS NFR BLD: 0.3 % (ref 0–1.9)
BUN SERPL-MCNC: 11 MG/DL (ref 8–23)
CALCIUM SERPL-MCNC: 8.8 MG/DL (ref 8.7–10.5)
CHLORIDE SERPL-SCNC: 94 MMOL/L (ref 95–110)
CO2 SERPL-SCNC: 25 MMOL/L (ref 23–29)
CREAT SERPL-MCNC: 0.6 MG/DL (ref 0.5–1.4)
CRP SERPL-MCNC: 139.5 MG/L (ref 0–8.2)
DIFFERENTIAL METHOD: ABNORMAL
EOSINOPHIL # BLD AUTO: 0.4 K/UL (ref 0–0.5)
EOSINOPHIL NFR BLD: 4.9 % (ref 0–8)
ERYTHROCYTE [DISTWIDTH] IN BLOOD BY AUTOMATED COUNT: 18.3 % (ref 11.5–14.5)
EST. GFR  (AFRICAN AMERICAN): >60 ML/MIN/1.73 M^2
EST. GFR  (NON AFRICAN AMERICAN): >60 ML/MIN/1.73 M^2
GLUCOSE SERPL-MCNC: 87 MG/DL (ref 70–110)
HCT VFR BLD AUTO: 33.8 % (ref 37–48.5)
HGB BLD-MCNC: 11 G/DL (ref 12–16)
LYMPHOCYTES # BLD AUTO: 1.1 K/UL (ref 1–4.8)
LYMPHOCYTES NFR BLD: 14.9 % (ref 18–48)
MCH RBC QN AUTO: 29.1 PG (ref 27–31)
MCHC RBC AUTO-ENTMCNC: 32.6 G/DL (ref 32–36)
MCV RBC AUTO: 89 FL (ref 82–98)
MONOCYTES # BLD AUTO: 0.9 K/UL (ref 0.3–1)
MONOCYTES NFR BLD: 12.1 % (ref 4–15)
NEUTROPHILS # BLD AUTO: 5.1 K/UL (ref 1.8–7.7)
NEUTROPHILS NFR BLD: 67.8 % (ref 38–73)
PLATELET # BLD AUTO: 152 K/UL (ref 150–350)
PMV BLD AUTO: 9.4 FL (ref 9.2–12.9)
POCT GLUCOSE: 120 MG/DL (ref 70–110)
POCT GLUCOSE: 144 MG/DL (ref 70–110)
POCT GLUCOSE: 167 MG/DL (ref 70–110)
POCT GLUCOSE: 89 MG/DL (ref 70–110)
POTASSIUM SERPL-SCNC: 3.6 MMOL/L (ref 3.5–5.1)
RBC # BLD AUTO: 3.78 M/UL (ref 4–5.4)
SODIUM SERPL-SCNC: 128 MMOL/L (ref 136–145)
WBC # BLD AUTO: 7.5 K/UL (ref 3.9–12.7)

## 2019-05-06 PROCEDURE — 97802 MEDICAL NUTRITION INDIV IN: CPT

## 2019-05-06 PROCEDURE — 12000002 HC ACUTE/MED SURGE SEMI-PRIVATE ROOM

## 2019-05-06 PROCEDURE — 25000003 PHARM REV CODE 250: Performed by: INTERNAL MEDICINE

## 2019-05-06 PROCEDURE — 25000003 PHARM REV CODE 250: Performed by: HOSPITALIST

## 2019-05-06 PROCEDURE — 80048 BASIC METABOLIC PNL TOTAL CA: CPT

## 2019-05-06 PROCEDURE — 25000003 PHARM REV CODE 250: Performed by: NURSE PRACTITIONER

## 2019-05-06 PROCEDURE — 25000242 PHARM REV CODE 250 ALT 637 W/ HCPCS: Performed by: INTERNAL MEDICINE

## 2019-05-06 PROCEDURE — 86140 C-REACTIVE PROTEIN: CPT

## 2019-05-06 PROCEDURE — 85025 COMPLETE CBC W/AUTO DIFF WBC: CPT

## 2019-05-06 PROCEDURE — S0077 INJECTION, CLINDAMYCIN PHOSP: HCPCS | Performed by: INTERNAL MEDICINE

## 2019-05-06 PROCEDURE — 36415 COLL VENOUS BLD VENIPUNCTURE: CPT

## 2019-05-06 PROCEDURE — 94761 N-INVAS EAR/PLS OXIMETRY MLT: CPT

## 2019-05-06 PROCEDURE — 94640 AIRWAY INHALATION TREATMENT: CPT

## 2019-05-06 PROCEDURE — 63600175 PHARM REV CODE 636 W HCPCS: Performed by: HOSPITALIST

## 2019-05-06 RX ORDER — GABAPENTIN 400 MG/1
800 CAPSULE ORAL 3 TIMES DAILY
Status: DISCONTINUED | OUTPATIENT
Start: 2019-05-07 | End: 2019-05-08 | Stop reason: HOSPADM

## 2019-05-06 RX ADMIN — VANCOMYCIN HYDROCHLORIDE 1500 MG: 1 INJECTION, POWDER, FOR SOLUTION INTRAVENOUS at 03:05

## 2019-05-06 RX ADMIN — FLUTICASONE FUROATE AND VILANTEROL TRIFENATATE 1 PUFF: 100; 25 POWDER RESPIRATORY (INHALATION) at 08:05

## 2019-05-06 RX ADMIN — APIXABAN 5 MG: 2.5 TABLET, FILM COATED ORAL at 08:05

## 2019-05-06 RX ADMIN — ACETAMINOPHEN 650 MG: 325 TABLET, FILM COATED ORAL at 03:05

## 2019-05-06 RX ADMIN — IBUPROFEN 600 MG: 600 TABLET ORAL at 06:05

## 2019-05-06 RX ADMIN — CLINDAMYCIN IN 5 PERCENT DEXTROSE 600 MG: 12 INJECTION, SOLUTION INTRAVENOUS at 05:05

## 2019-05-06 RX ADMIN — IPRATROPIUM BROMIDE AND ALBUTEROL SULFATE 3 ML: .5; 3 SOLUTION RESPIRATORY (INHALATION) at 12:05

## 2019-05-06 RX ADMIN — IPRATROPIUM BROMIDE AND ALBUTEROL SULFATE 3 ML: .5; 3 SOLUTION RESPIRATORY (INHALATION) at 08:05

## 2019-05-06 RX ADMIN — HYDROCODONE BITARTRATE AND ACETAMINOPHEN 1 TABLET: 5; 325 TABLET ORAL at 09:05

## 2019-05-06 RX ADMIN — IBUPROFEN 600 MG: 600 TABLET ORAL at 01:05

## 2019-05-06 RX ADMIN — CLONAZEPAM 0.5 MG: 0.5 TABLET ORAL at 08:05

## 2019-05-06 RX ADMIN — FLUCONAZOLE 200 MG: 100 TABLET ORAL at 09:05

## 2019-05-06 RX ADMIN — CLINDAMYCIN IN 5 PERCENT DEXTROSE 600 MG: 12 INJECTION, SOLUTION INTRAVENOUS at 11:05

## 2019-05-06 RX ADMIN — ENOXAPARIN SODIUM 40 MG: 100 INJECTION SUBCUTANEOUS at 09:05

## 2019-05-06 RX ADMIN — CLINDAMYCIN IN 5 PERCENT DEXTROSE 600 MG: 12 INJECTION, SOLUTION INTRAVENOUS at 06:05

## 2019-05-06 RX ADMIN — ATORVASTATIN CALCIUM 20 MG: 20 TABLET, FILM COATED ORAL at 09:05

## 2019-05-06 RX ADMIN — IBUPROFEN 600 MG: 600 TABLET ORAL at 09:05

## 2019-05-06 RX ADMIN — HYDROCODONE BITARTRATE AND ACETAMINOPHEN 1 TABLET: 5; 325 TABLET ORAL at 08:05

## 2019-05-06 RX ADMIN — METOPROLOL SUCCINATE 12.5 MG: 25 TABLET, EXTENDED RELEASE ORAL at 09:05

## 2019-05-06 RX ADMIN — IBUPROFEN 600 MG: 600 TABLET ORAL at 08:05

## 2019-05-06 RX ADMIN — PANTOPRAZOLE SODIUM 40 MG: 40 TABLET, DELAYED RELEASE ORAL at 09:05

## 2019-05-06 RX ADMIN — VANCOMYCIN HYDROCHLORIDE 1500 MG: 1 INJECTION, POWDER, FOR SOLUTION INTRAVENOUS at 02:05

## 2019-05-06 RX ADMIN — IPRATROPIUM BROMIDE AND ALBUTEROL SULFATE 3 ML: .5; 3 SOLUTION RESPIRATORY (INHALATION) at 07:05

## 2019-05-06 RX ADMIN — CLONAZEPAM 0.5 MG: 0.5 TABLET ORAL at 09:05

## 2019-05-06 NOTE — PROGRESS NOTES
[facility] Hospital Medicine  Progress Note    Patient Name: Nelly Tian  MRN: [id number]  Patient Class: IP- Inpatient   Admission Date: 5/3/2019  Length of Stay: 2 days  Attending Physician: Toma Goins MD  Primary Care Provider: Constantine Bird MD        Subjective:     Principal Problem:Abdominal wall cellulitis    HPI:  Ms. Tian is a 69 yo lady who with pmhx of COPD, CHF, HTN, DM2, A fib, MI, morbid obesity, recurrent panniculitis who presented to the ED with erythema and swelling of left abd wall x 1 day. Daughter reports she was in her normal state of health until this morning where she starting having shaking chills and redness/pain/swelling around left abd wall. Known hernia on left. No other complaints. Patient sees wound care in outpatient setting. In the ED, she was febrile. Elevated WBC at 17 with neutrophilia. Lactate 2.6. Given 1.5L NS and vancomycin & clindamycin. Medicine was consulted for admission.     Hospital Course:  No notes on file    Interval History:  Patient seen and examined.  Reports continued mild improvement in symptoms.    Review of Systems   Constitutional: Negative for chills and fever.   Respiratory: Negative for apnea and chest tightness.    Cardiovascular: Negative for chest pain and leg swelling.   Gastrointestinal: Negative for abdominal distention and abdominal pain.   Genitourinary: Negative for difficulty urinating and dysuria.   Neurological: Negative for dizziness and weakness.   Psychiatric/Behavioral: Negative for agitation and confusion.     Objective:     Vital Signs (Most Recent):  Temp: 97 °F (36.1 °C) (05/06/19 1132)  Pulse: 66 (05/06/19 1229)  Resp: 18 (05/06/19 1229)  BP: (!) 135/53 (05/06/19 1132)  SpO2: (!) 94 % (05/06/19 1229) Vital Signs (24h Range):  Temp:  [96.1 °F (35.6 °C)-98.3 °F (36.8 °C)] 97 °F (36.1 °C)  Pulse:  [57-70] 66  Resp:  [16-18] 18  SpO2:  [93 %-98 %] 94 %  BP: ()/(49-57) 135/53     Weight: 121.1 kg (267  lb)  Body mass index is 40.6 kg/m².    Intake/Output Summary (Last 24 hours) at 5/6/2019 1459  Last data filed at 5/6/2019 0516  Gross per 24 hour   Intake 1650 ml   Output 1600 ml   Net 50 ml      Physical Exam   Constitutional: She is oriented to person, place, and time. No distress.   Morbid obesity   HENT:   Head: Normocephalic and atraumatic.   Nose: Nose normal.   Mouth/Throat: No oropharyngeal exudate.   Eyes: Conjunctivae are normal. Right eye exhibits no discharge. Left eye exhibits no discharge. No scleral icterus.   Neck: Normal range of motion. Neck supple.   Cardiovascular: Normal rate, regular rhythm, normal heart sounds and intact distal pulses. Exam reveals no gallop and no friction rub.   No murmur heard.  Pulmonary/Chest: Effort normal and breath sounds normal. No stridor. No respiratory distress. She has no wheezes.   Abdominal: Soft. Bowel sounds are normal. She exhibits no distension. There is no tenderness. There is no rebound.   Obese abdomen. Left abdominal wall with erythema, tenderness, induration and warmth. No purulent drainage noted. No fluctuance.   Musculoskeletal: Normal range of motion. She exhibits no edema or deformity.   Neurological: She is alert and oriented to person, place, and time.   Skin: Skin is warm. Capillary refill takes less than 2 seconds. Rash noted. She is not diaphoretic. There is erythema.   Nursing note and vitals reviewed.      Significant Labs: All pertinent labs within the past 24 hours have been reviewed.    Significant Imaging: I have reviewed and interpreted all pertinent imaging results/findings within the past 24 hours.    Assessment/Plan:      * Abdominal wall cellulitis  - Recurrent infections per history and chart review  - Continue Vancomycin and Clindamycin - white count has normalized  - F/u blood cultures - no growth so far  - CT scan of the abdominal wall could not be done due to habitus.  Soft tissue ultrasound with no evidence of abscess  - ID  following, appreciate recommendations.  - PRN Acetaminophen.  Ibuprofen scheduled for 2 days per Infectious Disease    Chronic prescription opiate use  Norco  #90 filled on 4/27    Chronically on benzodiazepine therapy  - Patient filled Clonazepam 0.5 mg #60 on 4/27  - Was held secondary to encephalopathy  - Will resume slowly.    Encephalopathy, metabolic  - Resolved. Likely related to sepsis at presentation.  - CT head did not show any acute abnormality.    Other nonthrombocytopenic purpura  Noted. No issues.    Type 2 diabetes mellitus, without long-term current use of insulin  - A1c 5.6 in Jan 2019  - Low dose sliding scale      CHF (congestive heart failure)  - No evidence of exacerbation    COPD (chronic obstructive pulmonary disease)  - No evidence of exacerbation  - Bronchodilators PRN  - Home meds    Hypertension associated with diabetes  - No acute issues  - Home meds    Chronic atrial fibrillation  - Continue beta blocker, restart Eliquis since a drainable abscess has not been identified    Long term current use of anticoagulant therapy  - restart Eliquis today and at that abscess has been ruled out        VTE Risk Mitigation (From admission, onward)        Ordered     enoxaparin injection 40 mg  Every 12 hours      05/04/19 1718     IP VTE HIGH RISK PATIENT  Once      05/04/19 0154              Toma Goins MD  Department of Hospital Medicine   [facility]

## 2019-05-06 NOTE — SUBJECTIVE & OBJECTIVE
Interval History:  Patient seen and examined.  Reports continued mild improvement in symptoms.    Review of Systems   Constitutional: Negative for chills and fever.   Respiratory: Negative for apnea and chest tightness.    Cardiovascular: Negative for chest pain and leg swelling.   Gastrointestinal: Negative for abdominal distention and abdominal pain.   Genitourinary: Negative for difficulty urinating and dysuria.   Neurological: Negative for dizziness and weakness.   Psychiatric/Behavioral: Negative for agitation and confusion.     Objective:     Vital Signs (Most Recent):  Temp: 97 °F (36.1 °C) (05/06/19 1132)  Pulse: 66 (05/06/19 1229)  Resp: 18 (05/06/19 1229)  BP: (!) 135/53 (05/06/19 1132)  SpO2: (!) 94 % (05/06/19 1229) Vital Signs (24h Range):  Temp:  [96.1 °F (35.6 °C)-98.3 °F (36.8 °C)] 97 °F (36.1 °C)  Pulse:  [57-70] 66  Resp:  [16-18] 18  SpO2:  [93 %-98 %] 94 %  BP: ()/(49-57) 135/53     Weight: 121.1 kg (267 lb)  Body mass index is 40.6 kg/m².    Intake/Output Summary (Last 24 hours) at 5/6/2019 1459  Last data filed at 5/6/2019 0516  Gross per 24 hour   Intake 1650 ml   Output 1600 ml   Net 50 ml      Physical Exam   Constitutional: She is oriented to person, place, and time. No distress.   Morbid obesity   HENT:   Head: Normocephalic and atraumatic.   Nose: Nose normal.   Mouth/Throat: No oropharyngeal exudate.   Eyes: Conjunctivae are normal. Right eye exhibits no discharge. Left eye exhibits no discharge. No scleral icterus.   Neck: Normal range of motion. Neck supple.   Cardiovascular: Normal rate, regular rhythm, normal heart sounds and intact distal pulses. Exam reveals no gallop and no friction rub.   No murmur heard.  Pulmonary/Chest: Effort normal and breath sounds normal. No stridor. No respiratory distress. She has no wheezes.   Abdominal: Soft. Bowel sounds are normal. She exhibits no distension. There is no tenderness. There is no rebound.   Obese abdomen. Left abdominal wall with  erythema, tenderness, induration and warmth. No purulent drainage noted. No fluctuance.   Musculoskeletal: Normal range of motion. She exhibits no edema or deformity.   Neurological: She is alert and oriented to person, place, and time.   Skin: Skin is warm. Capillary refill takes less than 2 seconds. Rash noted. She is not diaphoretic. There is erythema.   Nursing note and vitals reviewed.      Significant Labs: All pertinent labs within the past 24 hours have been reviewed.    Significant Imaging: I have reviewed and interpreted all pertinent imaging results/findings within the past 24 hours.

## 2019-05-06 NOTE — PLAN OF CARE
Receiving aerosol tx q6prn.        05/06/19 0817 05/06/19 0825   PRE-TX-O2   O2 Device (Oxygen Therapy) nasal cannula  --    Flow (L/min) 2 2   SpO2 95 %  (Simultaneous filing. User may not have seen previous data.) 95 %   Pulse Oximetry Type Intermittent Intermittent   $ Pulse Oximetry - Multiple Charge Pulse Oximetry - Multiple Pulse Oximetry - Multiple   Pulse 61  (Simultaneous filing. User may not have seen previous data.) 61   Resp 18  (Simultaneous filing. User may not have seen previous data.) 18   Temp 96.1 °F (35.6 °C)  --    BP (!) 105/57  --    Aerosol Therapy   $ Aerosol Therapy Charges Aerosol Treatment  --    Respiratory Treatment Status (SVN) given  --    Treatment Route (SVN) mask  --    Patient Position (SVN) HOB elevated  --    Signs of Intolerance (SVN) none  --    Inhaler   $ Inhaler Charges  --  MDI (Metered Dose Inahler) Treatment;Mouth rinsed post treatment   Respiratory Treatment Status (Inhaler)  --  given   Treatment Route (Inhaler)  --  mouthpiece   Patient Position (Inhaler)  --  HOB elevated   Signs of Intolerance (Inhaler)  --  none   Breath Sounds Post-Respiratory Treatment   Throughout All Fields Post-Treatment no change  --    Post-treatment Heart Rate (beats/min) 63 63   Post-treatment Resp Rate (breaths/min) 18 18

## 2019-05-06 NOTE — ASSESSMENT & PLAN NOTE
- Recurrent infections per history and chart review  - Continue Vancomycin and Clindamycin - white count has normalized  - F/u blood cultures - no growth so far  - CT scan of the abdominal wall could not be done due to habitus.  Soft tissue ultrasound with no evidence of abscess  - ID following, appreciate recommendations.  - PRN Acetaminophen.  Ibuprofen scheduled for 2 days per Infectious Disease

## 2019-05-06 NOTE — PLAN OF CARE
Problem: Adult Inpatient Plan of Care  Goal: Patient-Specific Goal (Individualization)  Interventions: Modified carbohydrate diet    Recommendations: 1) Continue DM 2000 calorie diet 2) Add Beneprotein, tid 3) Please obtain actual wt instead of stated weight  Goals: 1) PO intake >=85% EEN during admit   Nutrition Goal Status: new  Communication of RD Recs: (POC, sticky note)

## 2019-05-06 NOTE — NURSING
No acute changes this shift Respirations even and unlabored Dressings to left outer abdominal wall with moderate drainage Requested guaze supplies to change dressing Refused to allow nurse to do it. Call light in reach Side rails x 3.

## 2019-05-06 NOTE — PT/OT/SLP PROGRESS
Physical Therapy      Patient Name:  Nelly Tian   MRN:  [id number]    PT attempted in am - showering with daughter. reattempted in pm and just back to bed- stated of being too tired now- pt requesting no ambulation. Pt encouraged ankle pumping exercises and stated that she does them all the time.    Cyndy Banda, PT

## 2019-05-06 NOTE — ASSESSMENT & PLAN NOTE
Contributing Nutrition Diagnosis  Impaired nutrient utilization    Related to (etiology):   Endocrine dysfunction    Signs and Symptoms (as evidenced by):   Diagnosis of DM    Interventions/Recommendations (treatment strategy):  DM diet     Nutrition Diagnosis Status:   New

## 2019-05-06 NOTE — PROGRESS NOTES
"[facility]  Adult Nutrition  Progress Note    SUMMARY   Interventions: Modified carbohydrate diet    Recommendations: 1) Continue DM 2000 calorie diet 2) Add Beneprotein, tid 3) Please obtain actual wt instead of stated weight  Goals: 1) PO intake >=85% EEN during admit   Nutrition Goal Status: new  Communication of RD Recs: (POC, sticky note)    Reason for Assessment    Reason For Assessment: identified at risk by screening criteria  Diagnosis: infection/sepsis  Relevant Medical History: Admitted with abdominal wall cellulitis. PMH DM2, CHF, HTN  Interdisciplinary Rounds: did not attend  General Information Comments: Pt alert. Reports her appetite is improving.  On admit pt was eating 0%. Intentional wt loss per pt. NFPE completed. Protein nutrition education provided. Pt accepting of protein supplement.  Nutrition Discharge Planning: Diabetic diet with protein supplement.     Nutrition Risk Screen    Nutrition Risk Screen: large or nonhealing wound, burn or pressure injury    Nutrition/Diet History    Patient Reported Diet/Restrictions/Preferences: diabetic diet  Typical Food/Fluid Intake: Daughter shops/cooks. Mostly meat/veggies. Watches bread and other starches.   Spiritual, Cultural Beliefs, Yarsanism Practices, Values that Affect Care: no  Food Allergies: NKFA(or intolerances)  Factors Affecting Nutritional Intake: None identified at this time    Anthropometrics    Temp: 98.6 °F (37 °C)  Height Method: Stated  Height: 5' 8"  Height (inches): 68 in  Weight Method: Stated  Weight: 121.1 kg (266 lb 15.6 oz)  Weight (lb): 266.98 lb  Ideal Body Weight (IBW), Female: 140 lb  % Ideal Body Weight, Female (lb): 190.7 lb  BMI (Calculated): 40.7  BMI Grade: greater than 40 - morbid obesity  Weight Loss: intentional  Usual Body Weight (UBW), k.4 kg(Chart review 19)  % Usual Body Weight: 91.66  % Weight Change From Usual Weight: -8.54 %       Lab/Procedures/Meds    Pertinent Labs Reviewed: " reviewed  Pertinent Labs Comments: glucose 87, POCT 89, 120, A1c 5.6, .5  Lab Results   Component Value Date    ALBUMIN 3.8 05/03/2019       Pertinent Medications Reviewed: reviewed  Pertinent Medications Comments: statin, vanc    Physical Findings/Assessment         Estimated/Assessed Needs    Weight Used For Calorie Calculations: 121.1 kg (266 lb 15.6 oz)  Energy Calorie Requirements (kcal): 1795 (no AF, obesity)     Protein Requirements:  (0.8-1 g/kg/day, obese)  Weight Used For Protein Calculations: 121.1 kg (266 lb 15.6 oz)     Estimated Fluid Requirement Method: RDA Method(or per MD)  RDA Method (mL): 1795  CHO Requirement: 45-50% EEN      Nutrition Prescription Ordered    Current Diet Order: DM 2000 calorie    Evaluation of Received Nutrient/Fluid Intake    Energy Calories Required: not meeting needs  Protein Required: not meeting needs  Fluid Required: not meeting needs (close)    Intake/Output Summary (Last 24 hours) at 5/6/2019 1655  Last data filed at 5/6/2019 0516  Gross per 24 hour   Intake 1650 ml   Output 1600 ml   Net 50 ml     Tolerance: tolerating  % Intake of Estimated Energy Needs: 50 - 75 %  % Meal Intake: 50 - 75 %    Nutrition Risk    Level of Risk/Frequency of Follow-up: (1 x wkly)     Assessment and Plan    Type 2 diabetes mellitus, without long-term current use of insulin  Contributing Nutrition Diagnosis  Impaired nutrient utilization    Related to (etiology):   Endocrine dysfunction    Signs and Symptoms (as evidenced by):   Diagnosis of DM    Interventions/Recommendations (treatment strategy):  DM diet     Nutrition Diagnosis Status:   New           Monitor and Evaluation    Food and Nutrient Intake: energy intake  Food and Nutrient Adminstration: diet order  Anthropometric Measurements: weight  Biochemical Data, Medical Tests and Procedures: electrolyte and renal panel, glucose/endocrine profile, inflammatory profile  Nutrition-Focused Physical Findings: overall appearance,  skin     Malnutrition Assessment  Malnutrition Type: (none found)  Skin (Micronutrient): (Jung score 18, abdomenal wall cellulitis with elevated CRP)   Micronutrient Evaluation: suspected deficiency   Weight Loss (Malnutrition): (Current wt stated. Pt has been trying to lose weight. )           Edema (Fluid Accumulation): 2-->mild(bilateral legs and feet)   Subcutaneous Fat Loss (Final Summary): well nourished  Muscle Loss Evaluation (Final Summary): well nourished         Nutrition Follow-Up  yes

## 2019-05-07 ENCOUNTER — OUTSIDE PLACE OF SERVICE (OUTPATIENT)
Dept: INFECTIOUS DISEASES | Facility: CLINIC | Age: 69
End: 2019-05-07
Payer: MEDICARE

## 2019-05-07 LAB
ANION GAP SERPL CALC-SCNC: 8 MMOL/L (ref 8–16)
BASOPHILS # BLD AUTO: 0 K/UL (ref 0–0.2)
BASOPHILS NFR BLD: 0.2 % (ref 0–1.9)
BUN SERPL-MCNC: 12 MG/DL (ref 8–23)
CALCIUM SERPL-MCNC: 9 MG/DL (ref 8.7–10.5)
CHLORIDE SERPL-SCNC: 92 MMOL/L (ref 95–110)
CO2 SERPL-SCNC: 26 MMOL/L (ref 23–29)
CREAT SERPL-MCNC: 0.7 MG/DL (ref 0.5–1.4)
CRP SERPL-MCNC: 97.6 MG/L (ref 0–8.2)
DIFFERENTIAL METHOD: ABNORMAL
EOSINOPHIL # BLD AUTO: 0.4 K/UL (ref 0–0.5)
EOSINOPHIL NFR BLD: 5.5 % (ref 0–8)
ERYTHROCYTE [DISTWIDTH] IN BLOOD BY AUTOMATED COUNT: 18.1 % (ref 11.5–14.5)
EST. GFR  (AFRICAN AMERICAN): >60 ML/MIN/1.73 M^2
EST. GFR  (NON AFRICAN AMERICAN): >60 ML/MIN/1.73 M^2
GLUCOSE SERPL-MCNC: 101 MG/DL (ref 70–110)
HCT VFR BLD AUTO: 34.3 % (ref 37–48.5)
HGB BLD-MCNC: 11.2 G/DL (ref 12–16)
LYMPHOCYTES # BLD AUTO: 1.1 K/UL (ref 1–4.8)
LYMPHOCYTES NFR BLD: 14.3 % (ref 18–48)
MCH RBC QN AUTO: 28.8 PG (ref 27–31)
MCHC RBC AUTO-ENTMCNC: 32.7 G/DL (ref 32–36)
MCV RBC AUTO: 88 FL (ref 82–98)
MONOCYTES # BLD AUTO: 0.9 K/UL (ref 0.3–1)
MONOCYTES NFR BLD: 11.7 % (ref 4–15)
NEUTROPHILS # BLD AUTO: 5 K/UL (ref 1.8–7.7)
NEUTROPHILS NFR BLD: 68.3 % (ref 38–73)
PLATELET # BLD AUTO: 155 K/UL (ref 150–350)
PMV BLD AUTO: 9 FL (ref 9.2–12.9)
POCT GLUCOSE: 122 MG/DL (ref 70–110)
POCT GLUCOSE: 133 MG/DL (ref 70–110)
POCT GLUCOSE: 143 MG/DL (ref 70–110)
POCT GLUCOSE: 149 MG/DL (ref 70–110)
POTASSIUM SERPL-SCNC: 3.6 MMOL/L (ref 3.5–5.1)
RBC # BLD AUTO: 3.89 M/UL (ref 4–5.4)
SODIUM SERPL-SCNC: 126 MMOL/L (ref 136–145)
VANCOMYCIN TROUGH SERPL-MCNC: 15.2 UG/ML (ref 10–22)
WBC # BLD AUTO: 7.4 K/UL (ref 3.9–12.7)

## 2019-05-07 PROCEDURE — 12000002 HC ACUTE/MED SURGE SEMI-PRIVATE ROOM

## 2019-05-07 PROCEDURE — 25000003 PHARM REV CODE 250: Performed by: INTERNAL MEDICINE

## 2019-05-07 PROCEDURE — 80048 BASIC METABOLIC PNL TOTAL CA: CPT

## 2019-05-07 PROCEDURE — 94640 AIRWAY INHALATION TREATMENT: CPT

## 2019-05-07 PROCEDURE — 85025 COMPLETE CBC W/AUTO DIFF WBC: CPT

## 2019-05-07 PROCEDURE — 94761 N-INVAS EAR/PLS OXIMETRY MLT: CPT

## 2019-05-07 PROCEDURE — 97116 GAIT TRAINING THERAPY: CPT

## 2019-05-07 PROCEDURE — 80202 ASSAY OF VANCOMYCIN: CPT

## 2019-05-07 PROCEDURE — 25000242 PHARM REV CODE 250 ALT 637 W/ HCPCS: Performed by: HOSPITALIST

## 2019-05-07 PROCEDURE — 27000221 HC OXYGEN, UP TO 24 HOURS

## 2019-05-07 PROCEDURE — 25000003 PHARM REV CODE 250: Performed by: HOSPITALIST

## 2019-05-07 PROCEDURE — 99231 SBSQ HOSP IP/OBS SF/LOW 25: CPT | Mod: ,,, | Performed by: INTERNAL MEDICINE

## 2019-05-07 PROCEDURE — 99231 PR SUBSEQUENT HOSPITAL CARE,LEVL I: ICD-10-PCS | Mod: ,,, | Performed by: INTERNAL MEDICINE

## 2019-05-07 PROCEDURE — 86140 C-REACTIVE PROTEIN: CPT

## 2019-05-07 PROCEDURE — 25000242 PHARM REV CODE 250 ALT 637 W/ HCPCS: Performed by: INTERNAL MEDICINE

## 2019-05-07 PROCEDURE — 36415 COLL VENOUS BLD VENIPUNCTURE: CPT

## 2019-05-07 PROCEDURE — 25000003 PHARM REV CODE 250: Performed by: NURSE PRACTITIONER

## 2019-05-07 PROCEDURE — 63600175 PHARM REV CODE 636 W HCPCS: Performed by: HOSPITALIST

## 2019-05-07 PROCEDURE — S0077 INJECTION, CLINDAMYCIN PHOSP: HCPCS | Performed by: INTERNAL MEDICINE

## 2019-05-07 RX ORDER — FUROSEMIDE 40 MG/1
40 TABLET ORAL DAILY
Status: DISCONTINUED | OUTPATIENT
Start: 2019-05-07 | End: 2019-05-08 | Stop reason: HOSPADM

## 2019-05-07 RX ORDER — POLYETHYLENE GLYCOL 3350 17 G/17G
17 POWDER, FOR SOLUTION ORAL 2 TIMES DAILY
Status: DISCONTINUED | OUTPATIENT
Start: 2019-05-07 | End: 2019-05-08 | Stop reason: HOSPADM

## 2019-05-07 RX ORDER — IPRATROPIUM BROMIDE AND ALBUTEROL SULFATE 2.5; .5 MG/3ML; MG/3ML
3 SOLUTION RESPIRATORY (INHALATION) EVERY 6 HOURS
Status: DISCONTINUED | OUTPATIENT
Start: 2019-05-07 | End: 2019-05-08 | Stop reason: HOSPADM

## 2019-05-07 RX ORDER — AMOXICILLIN 250 MG
1 CAPSULE ORAL 2 TIMES DAILY
Status: DISCONTINUED | OUTPATIENT
Start: 2019-05-07 | End: 2019-05-08 | Stop reason: HOSPADM

## 2019-05-07 RX ADMIN — DOCUSATE SODIUM AND SENNOSIDES 1 TABLET: 8.6; 5 TABLET, FILM COATED ORAL at 09:05

## 2019-05-07 RX ADMIN — POLYETHYLENE GLYCOL 3350 17 G: 17 POWDER, FOR SOLUTION ORAL at 09:05

## 2019-05-07 RX ADMIN — DOCUSATE SODIUM AND SENNOSIDES 1 TABLET: 8.6; 5 TABLET, FILM COATED ORAL at 11:05

## 2019-05-07 RX ADMIN — APIXABAN 5 MG: 2.5 TABLET, FILM COATED ORAL at 09:05

## 2019-05-07 RX ADMIN — FUROSEMIDE 40 MG: 40 TABLET ORAL at 11:05

## 2019-05-07 RX ADMIN — CLONAZEPAM 0.5 MG: 0.5 TABLET ORAL at 09:05

## 2019-05-07 RX ADMIN — IBUPROFEN 600 MG: 600 TABLET ORAL at 06:05

## 2019-05-07 RX ADMIN — IBUPROFEN 600 MG: 600 TABLET ORAL at 09:05

## 2019-05-07 RX ADMIN — PANTOPRAZOLE SODIUM 40 MG: 40 TABLET, DELAYED RELEASE ORAL at 09:05

## 2019-05-07 RX ADMIN — FLUTICASONE FUROATE AND VILANTEROL TRIFENATATE 1 PUFF: 100; 25 POWDER RESPIRATORY (INHALATION) at 07:05

## 2019-05-07 RX ADMIN — IPRATROPIUM BROMIDE AND ALBUTEROL SULFATE 3 ML: .5; 3 SOLUTION RESPIRATORY (INHALATION) at 01:05

## 2019-05-07 RX ADMIN — ACETAMINOPHEN 650 MG: 325 TABLET, FILM COATED ORAL at 02:05

## 2019-05-07 RX ADMIN — CLINDAMYCIN IN 5 PERCENT DEXTROSE 600 MG: 12 INJECTION, SOLUTION INTRAVENOUS at 05:05

## 2019-05-07 RX ADMIN — IBUPROFEN 600 MG: 600 TABLET ORAL at 02:05

## 2019-05-07 RX ADMIN — METOPROLOL SUCCINATE 12.5 MG: 25 TABLET, EXTENDED RELEASE ORAL at 09:05

## 2019-05-07 RX ADMIN — VANCOMYCIN HYDROCHLORIDE 1500 MG: 1 INJECTION, POWDER, FOR SOLUTION INTRAVENOUS at 03:05

## 2019-05-07 RX ADMIN — CLINDAMYCIN IN 5 PERCENT DEXTROSE 600 MG: 12 INJECTION, SOLUTION INTRAVENOUS at 06:05

## 2019-05-07 RX ADMIN — IPRATROPIUM BROMIDE AND ALBUTEROL SULFATE 3 ML: .5; 3 SOLUTION RESPIRATORY (INHALATION) at 07:05

## 2019-05-07 RX ADMIN — IPRATROPIUM BROMIDE AND ALBUTEROL SULFATE 3 ML: .5; 3 SOLUTION RESPIRATORY (INHALATION) at 08:05

## 2019-05-07 RX ADMIN — GABAPENTIN 800 MG: 400 CAPSULE ORAL at 02:05

## 2019-05-07 RX ADMIN — VANCOMYCIN HYDROCHLORIDE 1500 MG: 1 INJECTION, POWDER, FOR SOLUTION INTRAVENOUS at 02:05

## 2019-05-07 RX ADMIN — CLINDAMYCIN IN 5 PERCENT DEXTROSE 600 MG: 12 INJECTION, SOLUTION INTRAVENOUS at 11:05

## 2019-05-07 RX ADMIN — ATORVASTATIN CALCIUM 20 MG: 20 TABLET, FILM COATED ORAL at 09:05

## 2019-05-07 RX ADMIN — GABAPENTIN 800 MG: 400 CAPSULE ORAL at 09:05

## 2019-05-07 RX ADMIN — POLYETHYLENE GLYCOL 3350 17 G: 17 POWDER, FOR SOLUTION ORAL at 11:05

## 2019-05-07 RX ADMIN — FLUCONAZOLE 200 MG: 100 TABLET ORAL at 09:05

## 2019-05-07 NOTE — CONSULTS
Nelly Tian [id number] is a 68 y.o. female who has been consulted for vancomycin dosing.    The patient has the following labs:     Date Creatinine (mg/dl)    BUN WBC Count   5/7/2019 Estimated Creatinine Clearance: 124.7 mL/min (based on SCr of 0.6 mg/dL). Lab Results   Component Value Date    BUN 11 05/06/2019     Lab Results   Component Value Date    WBC 7.40 05/07/2019        Current weight is 121.1 kg (266 lb 15.6 oz)    Pt is receiving vancomycin 1500 mg every 12 hours.  Vancomycin trough from 5/7 at 0221 was 15.2 mg/dL.  Target trough range is 10-15 mg/dL.   Trough was drawn on time and anticipate it is therapeutic. Pharmacy will continue current dose.   The patient will be continued on a vancomycin dose of 1500 mg every 12 hours. A vancomycin trough has been ordered prior to 3rd dose due 5/8 at 0230.      Patient will be followed by pharmacy for changes in renal function, toxicity, and efficacy.  Thank you for allowing us to participate in this patient's care.     Ammy Carrillo

## 2019-05-07 NOTE — PLAN OF CARE
Problem: Adult Inpatient Plan of Care  Goal: Plan of Care Review  Outcome: Ongoing (interventions implemented as appropriate)  Pt alert and oriented x 4  Positioned supine with Hob elevated   VSS, NAD noted.   POC reviewed with pt, acknowledged and understood  Bed locked in lowest position   Pt vanc trough 15.2, pharmacy to continue vanc at current dose  Call light in reach  Glucose monitoring   Will continue to monitor

## 2019-05-07 NOTE — CONSULTS
Known to wound care from previous admite.  Cellulitis with wound left abdominal wall; states being treated by [facility] wound clinic with honey.  Discussed plan of care with patient.   Patient is a 89F with HTN, DM, dementia, LBBB p/w acute SOB with CXR consistent with pulmonary edema. BNP elevated, and findings consistent with CHF. In 2008 she had preserved LV function with mild AS- exam today consistent with MR. I suspect CHF exacerbation in setting of valvular heart disease and likely diastolic dysfunction. Pt also being treated for possible pna/sepsis.     I had long meeting with the patients 2 daughters and Patient.  Patient had a MISA which showed- Moderate MR, Moderat AS based on Mean gradient is around 24 mm Hg. No pericardial effusion.  Patients baseline - Sedentary life style, confined to Wheel Chair, lives at home with the help of an Aide, who assists patient is getting in and out of Wheel Chair to Bath room and bed. Patient and family were explained about Valvular heart disease, Options of management Invasive versus Conservative medical therapy were discussed with the patient and her 2 daughters. Invasive Strategy included- Cardiac Cath followed by possible TAVR. Procedure risks and benefits were discussed. After careful consideration of these options, Patient and family decided with continuing Conservative Medical Therapy.    Patients family reports that patient is a DNR Code Status.    Discussed with Dr Vimal Matthews.

## 2019-05-07 NOTE — PT/OT/SLP PROGRESS
"Physical Therapy Treatment    Patient Name:  Nelly Tian   MRN:  [id number]    Recommendations:     Discharge Recommendations:  home   Discharge Equipment Recommendations: none   Barriers to discharge: None    Assessment:     Nelly Tian is a 68 y.o. female admitted with a medical diagnosis of Abdominal wall cellulitis.  She presents with the following impairments/functional limitations:  weakness, impaired endurance, gait instability. Pt ambulated at hallways with Rw with unsteadiness. Pt to be nefit from continued therapies.    Rehab Prognosis: Fair; patient would benefit from acute skilled PT services to address these deficits and reach maximum level of function.    Recent Surgery: * No surgery found *      Plan:     During this hospitalization, patient to be seen 6 x/week to address the identified rehab impairments via gait training, therapeutic activities, therapeutic exercises and progress toward the following goals:    · Plan of Care Expires:  05/20/19    Subjective     Chief Complaint: sleepy  Patient/Family Comments/goals: get well and go home  Pain/Comfort:  ·        Objective:     Communicated with nurse Sam prior to session.  Patient found HOB elevated with telemetry upon PT entry to room.     General Precautions: Standard, fall   Orthopedic Precautions:N/A   Braces: N/A     Functional Mobility:  · Bed Mobility:     · Rolling Right: minimum assistance  · Supine to Sit: minimum assistance  · Sit to Supine: minimum assistance  · Transfers:     · Sit to Stand:  contact guard assistance with hand-held assist  · Toilet Transfer: contact guard assistance with  hand-held assist  using  Stand Pivot  · Gait: 80ft with RW with min assist, assist maneuvering RW- several drifts from midline- stated " I just woke up"      AM-PAC 6 CLICK MOBILITY          Therapeutic Activities and Exercises:   commode use to void post PT  Returned to bed  Pt encouraged active LE's exercises    Patient left HOB elevated " with all lines intact and call button in reach..    GOALS:   Multidisciplinary Problems     Physical Therapy Goals        Problem: Physical Therapy Goal    Goal Priority Disciplines Outcome Goal Variances Interventions   Physical Therapy Goal     PT, PT/OT Ongoing (interventions implemented as appropriate)     Description:  1.Pt will be independent with bed mobility and transfers.  2.Pt will ambulate 250' with AD with CGA.                    Time Tracking:     PT Received On: 05/07/19  PT Start Time: 1314     PT Stop Time: 1324  PT Total Time (min): 10 min     Billable Minutes: Gait Training 10    Treatment Type: Treatment  PT/PTA: PT           Cyndy Banda, PT  05/07/2019

## 2019-05-07 NOTE — HOSPITAL COURSE
Patient was admitted to the hospital medicine service for abdominal panniculitis.  She was started on IV vancomycin and clindamycin and Infectious Disease was consulted.  Her anticoagulation was held pending imaging studies for abscess.  Ultrasound did not show abscess so anticoagulation was restarted.  She initially has metabolic encephalopathy which was likely secondary to sepsis and resolved quickly.  Physical therapy and occupational therapy were consulted.  She was discharged on clindamycin for 7 days, wound care, and preventive dose of Duricef per ID recommendations.  Home health will follow also.

## 2019-05-07 NOTE — PLAN OF CARE
05/06/19 1946   Patient Assessment/Suction   Level of Consciousness (AVPU) alert   Respiratory Effort Unlabored   Expansion/Accessory Muscles/Retractions no use of accessory muscles   All Lung Fields Breath Sounds diminished;clear   Rhythm/Pattern, Respiratory unlabored   Cough Frequency infrequent   Cough Type nonproductive   PRE-TX-O2   O2 Device (Oxygen Therapy) room air   Flow (L/min) 2   Oxygen Concentration (%) 28   SpO2 (!) 93 %   Pulse Oximetry Type Intermittent   $ Pulse Oximetry - Multiple Charge Pulse Oximetry - Multiple   Pulse 64   Resp 16   Aerosol Therapy   $ Aerosol Therapy Charges Aerosol Treatment   Respiratory Treatment Status (SVN) given   Treatment Route (SVN) mask   Patient Position (SVN) HOB elevated   Post Treatment Assessment (SVN) breath sounds unchanged   Signs of Intolerance (SVN) none   Breath Sounds Post-Respiratory Treatment   Throughout All Fields Post-Treatment All Fields   Throughout All Fields Post-Treatment no change   Post-treatment Heart Rate (beats/min) 66   Post-treatment Resp Rate (breaths/min) 16   Ready to Wean/Extubation Screen   FIO2<=50 (chart decimal) 0.28

## 2019-05-07 NOTE — SUBJECTIVE & OBJECTIVE
Interval History:  Patient seen and examined.  Reports pain is controlled.  Reports constipation.  Plan of care discussed with patient and daughter.    Review of Systems   Constitutional: Negative for chills and fever.   Respiratory: Negative for apnea and chest tightness.    Cardiovascular: Negative for chest pain and leg swelling.   Gastrointestinal: Positive for constipation. Negative for abdominal distention and abdominal pain.   Genitourinary: Negative for difficulty urinating and dysuria.   Neurological: Negative for dizziness and weakness.   Psychiatric/Behavioral: Negative for agitation and confusion.     Objective:     Vital Signs (Most Recent):  Temp: 97.9 °F (36.6 °C) (05/07/19 1131)  Pulse: (!) 55 (05/07/19 1333)  Resp: 18 (05/07/19 1333)  BP: 107/69 (05/07/19 1131)  SpO2: 100 % (05/07/19 1333) Vital Signs (24h Range):  Temp:  [96.4 °F (35.8 °C)-97.9 °F (36.6 °C)] 97.9 °F (36.6 °C)  Pulse:  [55-75] 55  Resp:  [16-20] 18  SpO2:  [92 %-100 %] 100 %  BP: (104-121)/(49-69) 107/69     Weight: 121.1 kg (266 lb 15.6 oz)  Body mass index is 40.59 kg/m².  No intake or output data in the 24 hours ending 05/07/19 1646   Physical Exam   Constitutional: She is oriented to person, place, and time. No distress.   Morbid obesity   HENT:   Head: Normocephalic and atraumatic.   Nose: Nose normal.   Mouth/Throat: No oropharyngeal exudate.   Eyes: Conjunctivae are normal. Right eye exhibits no discharge. Left eye exhibits no discharge. No scleral icterus.   Neck: Normal range of motion. Neck supple.   Cardiovascular: Normal rate, regular rhythm, normal heart sounds and intact distal pulses. Exam reveals no gallop and no friction rub.   No murmur heard.  Pulmonary/Chest: Effort normal and breath sounds normal. No stridor. No respiratory distress. She has no wheezes.   Abdominal: Soft. Bowel sounds are normal. She exhibits no distension. There is no tenderness. There is no rebound.   Obese abdomen. Left abdominal wall with  erythema, tenderness, induration and warmth. No purulent drainage noted. No fluctuance.   Musculoskeletal: Normal range of motion. She exhibits no edema or deformity.   Neurological: She is alert and oriented to person, place, and time.   Skin: Skin is warm. Capillary refill takes less than 2 seconds. Rash noted. She is not diaphoretic. There is erythema.   Nursing note and vitals reviewed.      Significant Labs: All pertinent labs within the past 24 hours have been reviewed.    Significant Imaging: I have reviewed and interpreted all pertinent imaging results/findings within the past 24 hours.

## 2019-05-07 NOTE — PROGRESS NOTES
"Progress Note  Infectious Disease    Follow-up For:  Anterior abdominal wall cellulitis    SUBJECTIVE:   05/05/2019 No new complaints. Rt ant abdominal wall feels about the same, tender to touch  05/06/2019 she is really pleased with improvement, " left ant abd wall is not as tender, warm as on admission"    Antibiotics (From admission, onward)    Start     Stop Route Frequency Ordered    05/05/19 0300  vancomycin (VANCOCIN) 1,500 mg in dextrose 5 % 250 mL IVPB      -- IV Every 12 hours (non-standard times) 05/04/19 1511    05/04/19 0600  clindamycin 600 MG/50 ML D5W 600 mg/50 mL IVPB 600 mg      -- IV Every 6 hours (non-standard times) 05/04/19 0100        Antifungals (From admission, onward)    Start     Stop Route Frequency Ordered    05/05/19 0923  fluconazole tablet 200 mg      -- Oral Daily 05/05/19 0923          EXAM & DIAGNOSTICS REVIEWED:   Vitals:     Temp:  [96.1 °F (35.6 °C)-98.6 °F (37 °C)]   Temp: 97 °F (36.1 °C) (05/06/19 1921)  Pulse: 64 (05/06/19 1946)  Resp: 16 (05/06/19 1946)  BP: (!) 104/51 (05/06/19 1921)  SpO2: (!) 93 % (05/06/19 1946)    Intake/Output Summary (Last 24 hours) at 5/6/2019 2152  Last data filed at 5/6/2019 0516  Gross per 24 hour   Intake 350 ml   Output --   Net 350 ml       General:  In NAD.   Eyes:  Anicteric,  ENT:  Mouth w/ pink MMM, no lesions/exudate,    Ok  dentition  Neck:  supple  Lungs: clear   Heart:  RRR, no gallop/murmur noted  Abd:  soft, NT, ND, normal BS, no masses/organomegaly appreciated.  :  Voids/Harding draining yellow urine, clear  Musc:  Joints without effusion, erythema, synovitis,   Skin:  Generally warm, dry, normal for color. No rashes  Wound: Several small wounds to lateral abdomen,  Erythema is improved, less nontender. Marked improvement of left groinfungal rash  Neuro: AAOx3, speech clear, moves all extrems equally  Extrem: No edema, erythema, phlebitis, cellulitis, synovitis       Lines/Tubes/Drains:    General Labs reviewed:  Recent Labs   Lab " 05/06/19  0531   WBC 7.50   RBC 3.78*   HGB 11.0*   HCT 33.8*      MCV 89   MCH 29.1   MCHC 32.6     Recent Labs   Lab 05/06/19  0531   CALCIUM 8.8   *   K 3.6   CO2 25   CL 94*   BUN 11   CREATININE 0.6       Micro:  Microbiology Results (last 7 days)     Procedure Component Value Units Date/Time    Blood culture [[id number]] Collected:  05/04/19 1140    Order Status:  Completed Specimen:  Blood Updated:  05/06/19 1622     Blood Culture, Routine No Growth to date     Blood Culture, Routine No Growth to date     Blood Culture, Routine No Growth to date    Blood culture [[id number]] Collected:  05/04/19 1140    Order Status:  Completed Specimen:  Blood Updated:  05/06/19 1622     Blood Culture, Routine No Growth to date     Blood Culture, Routine No Growth to date     Blood Culture, Routine No Growth to date    Blood culture x two cultures. Draw prior to antibiotics. [[id number]] Collected:  05/03/19 2310    Order Status:  Completed Specimen:  Blood from Peripheral, Left  Hand Updated:  05/06/19 1212     Blood Culture, Routine No Growth to date     Blood Culture, Routine No Growth to date     Blood Culture, Routine No Growth to date    Narrative:       Aerobic and anaerobic    Blood culture x two cultures. Draw prior to antibiotics. [[id number]] Collected:  05/03/19 2253    Order Status:  Completed Specimen:  Blood from Peripheral, Right  Hand Updated:  05/06/19 1212     Blood Culture, Routine No Growth to date     Blood Culture, Routine No Growth to date     Blood Culture, Routine No Growth to date    Narrative:       Aerobic and anaerobic    Blood culture [[id number]]     Order Status:  Canceled Specimen:  Blood     Blood culture [[id number]]     Order Status:  Canceled Specimen:  Blood           Imaging Reviewed:  There is prominent subcutaneous edema and skin thickening observed in the area of clinical concern over the left lower quadrant abdominal wall compatible with the provided history of  panniculitis/cellulitis.  No abscess appreciated.    ASSESSMENT & PLAN      Patient Active Problem List   Diagnosis    Diabetes mellitus with neuropathy    Long term current use of anticoagulant therapy    Major depression, recurrent, chronic    GERD (gastroesophageal reflux disease)    Tobacco dependency    Chronic atrial fibrillation    Morbid obesity with BMI of 40.0-44.9, adult    Hypertension associated with diabetes    PVD (peripheral vascular disease)    Abdominal aortic atherosclerosis    Dependency on pain medication    Iron deficiency anemia    DDD (degenerative disc disease), lumbar    Type 2 diabetes mellitus with diabetic neuropathy, without long-term current use of insulin    Hyperlipidemia associated with type 2 diabetes mellitus    NSAID long-term use    Mixed restrictive and obstructive lung disease    Hypercapnic respiratory failure, chronic    COPD (chronic obstructive pulmonary disease)    Cellulitis, abdominal wall    CHF (congestive heart failure)    Hepatomegaly    Chronic combined systolic and diastolic CHF (congestive heart failure)    Decubitus ulcer    Type 2 diabetes mellitus, without long-term current use of insulin    Retinal detachment of left eye with multiple breaks    Chronic pain syndrome    Wound, open, abdominal wall, lateral    Other nonthrombocytopenic purpura    Abdominal wall cellulitis    Encephalopathy, metabolic    Chronically on benzodiazepine therapy    Chronic prescription opiate use          Panniculitis of LL abdominal wall, with associated ulcers   ----139              Procal 0.56              Fungal infection of groin-- marked improvement    Morbid obesity    Recommendation:   Cont vanc and Clinda for panniculitis  Cont diflucan for fungal inf of groin  Monitor CRP  Us= no abscess  Ibuprofen OTC x 2 days

## 2019-05-07 NOTE — PLAN OF CARE
Problem: Physical Therapy Goal  Goal: Physical Therapy Goal  1.Pt will be independent with bed mobility and transfers.  2.Pt will ambulate 250' with AD with CGA.   Outcome: Ongoing (interventions implemented as appropriate)  Pt seen for gait training 80ft with RW min assist, commode use to void.

## 2019-05-08 VITALS
HEIGHT: 68 IN | WEIGHT: 267 LBS | RESPIRATION RATE: 18 BRPM | DIASTOLIC BLOOD PRESSURE: 57 MMHG | HEART RATE: 56 BPM | OXYGEN SATURATION: 97 % | BODY MASS INDEX: 40.47 KG/M2 | SYSTOLIC BLOOD PRESSURE: 120 MMHG | TEMPERATURE: 98 F

## 2019-05-08 LAB
ANION GAP SERPL CALC-SCNC: 8 MMOL/L (ref 8–16)
BASOPHILS # BLD AUTO: 0.1 K/UL (ref 0–0.2)
BASOPHILS NFR BLD: 0.8 % (ref 0–1.9)
BUN SERPL-MCNC: 11 MG/DL (ref 8–23)
CALCIUM SERPL-MCNC: 9 MG/DL (ref 8.7–10.5)
CHLORIDE SERPL-SCNC: 97 MMOL/L (ref 95–110)
CO2 SERPL-SCNC: 29 MMOL/L (ref 23–29)
CREAT SERPL-MCNC: 0.6 MG/DL (ref 0.5–1.4)
CRP SERPL-MCNC: 71.4 MG/L (ref 0–8.2)
DIFFERENTIAL METHOD: ABNORMAL
EOSINOPHIL # BLD AUTO: 0.4 K/UL (ref 0–0.5)
EOSINOPHIL NFR BLD: 5.5 % (ref 0–8)
ERYTHROCYTE [DISTWIDTH] IN BLOOD BY AUTOMATED COUNT: 17.9 % (ref 11.5–14.5)
EST. GFR  (AFRICAN AMERICAN): >60 ML/MIN/1.73 M^2
EST. GFR  (NON AFRICAN AMERICAN): >60 ML/MIN/1.73 M^2
GLUCOSE SERPL-MCNC: 117 MG/DL (ref 70–110)
HCT VFR BLD AUTO: 33.5 % (ref 37–48.5)
HGB BLD-MCNC: 10.6 G/DL (ref 12–16)
LYMPHOCYTES # BLD AUTO: 1 K/UL (ref 1–4.8)
LYMPHOCYTES NFR BLD: 16.3 % (ref 18–48)
MCH RBC QN AUTO: 28.5 PG (ref 27–31)
MCHC RBC AUTO-ENTMCNC: 31.8 G/DL (ref 32–36)
MCV RBC AUTO: 90 FL (ref 82–98)
MONOCYTES # BLD AUTO: 1 K/UL (ref 0.3–1)
MONOCYTES NFR BLD: 15.6 % (ref 4–15)
NEUTROPHILS # BLD AUTO: 3.9 K/UL (ref 1.8–7.7)
NEUTROPHILS NFR BLD: 61.8 % (ref 38–73)
PLATELET # BLD AUTO: 166 K/UL (ref 150–350)
PMV BLD AUTO: 9 FL (ref 9.2–12.9)
POCT GLUCOSE: 103 MG/DL (ref 70–110)
POCT GLUCOSE: 134 MG/DL (ref 70–110)
POTASSIUM SERPL-SCNC: 3.8 MMOL/L (ref 3.5–5.1)
RBC # BLD AUTO: 3.74 M/UL (ref 4–5.4)
SODIUM SERPL-SCNC: 134 MMOL/L (ref 136–145)
VANCOMYCIN TROUGH SERPL-MCNC: 15.3 UG/ML (ref 10–22)
WBC # BLD AUTO: 6.4 K/UL (ref 3.9–12.7)

## 2019-05-08 PROCEDURE — 90471 IMMUNIZATION ADMIN: CPT | Performed by: INTERNAL MEDICINE

## 2019-05-08 PROCEDURE — 63600175 PHARM REV CODE 636 W HCPCS: Performed by: INTERNAL MEDICINE

## 2019-05-08 PROCEDURE — 80048 BASIC METABOLIC PNL TOTAL CA: CPT

## 2019-05-08 PROCEDURE — 25000003 PHARM REV CODE 250: Performed by: INTERNAL MEDICINE

## 2019-05-08 PROCEDURE — 80202 ASSAY OF VANCOMYCIN: CPT

## 2019-05-08 PROCEDURE — 25000003 PHARM REV CODE 250: Performed by: NURSE PRACTITIONER

## 2019-05-08 PROCEDURE — 86140 C-REACTIVE PROTEIN: CPT

## 2019-05-08 PROCEDURE — S0077 INJECTION, CLINDAMYCIN PHOSP: HCPCS | Performed by: INTERNAL MEDICINE

## 2019-05-08 PROCEDURE — 63600175 PHARM REV CODE 636 W HCPCS: Performed by: HOSPITALIST

## 2019-05-08 PROCEDURE — 94761 N-INVAS EAR/PLS OXIMETRY MLT: CPT

## 2019-05-08 PROCEDURE — 27000221 HC OXYGEN, UP TO 24 HOURS

## 2019-05-08 PROCEDURE — 25000242 PHARM REV CODE 250 ALT 637 W/ HCPCS: Performed by: HOSPITALIST

## 2019-05-08 PROCEDURE — 25000003 PHARM REV CODE 250: Performed by: HOSPITALIST

## 2019-05-08 PROCEDURE — 85025 COMPLETE CBC W/AUTO DIFF WBC: CPT

## 2019-05-08 PROCEDURE — 90715 TDAP VACCINE 7 YRS/> IM: CPT | Performed by: INTERNAL MEDICINE

## 2019-05-08 PROCEDURE — 94640 AIRWAY INHALATION TREATMENT: CPT

## 2019-05-08 RX ORDER — CLINDAMYCIN HYDROCHLORIDE 300 MG/1
300 CAPSULE ORAL EVERY 6 HOURS
Qty: 28 CAPSULE | Refills: 0 | Status: SHIPPED | OUTPATIENT
Start: 2019-05-08 | End: 2019-05-08 | Stop reason: SDUPTHER

## 2019-05-08 RX ORDER — CEFADROXIL 500 MG/1
500 CAPSULE ORAL EVERY 12 HOURS
Qty: 60 CAPSULE | Refills: 3 | OUTPATIENT
Start: 2019-05-08 | End: 2019-05-27

## 2019-05-08 RX ORDER — CLINDAMYCIN HYDROCHLORIDE 300 MG/1
300 CAPSULE ORAL EVERY 6 HOURS
Qty: 28 CAPSULE | Refills: 0 | Status: SHIPPED | OUTPATIENT
Start: 2019-05-08 | End: 2019-05-15

## 2019-05-08 RX ORDER — CEFADROXIL 500 MG/1
500 CAPSULE ORAL EVERY 12 HOURS
Qty: 60 CAPSULE | Refills: 3 | OUTPATIENT
Start: 2019-05-08 | End: 2019-05-08 | Stop reason: SDUPTHER

## 2019-05-08 RX ADMIN — CLINDAMYCIN IN 5 PERCENT DEXTROSE 600 MG: 12 INJECTION, SOLUTION INTRAVENOUS at 12:05

## 2019-05-08 RX ADMIN — CLINDAMYCIN IN 5 PERCENT DEXTROSE 600 MG: 12 INJECTION, SOLUTION INTRAVENOUS at 05:05

## 2019-05-08 RX ADMIN — VANCOMYCIN HYDROCHLORIDE 1250 MG: 1 INJECTION, POWDER, LYOPHILIZED, FOR SOLUTION INTRAVENOUS at 04:05

## 2019-05-08 RX ADMIN — IPRATROPIUM BROMIDE AND ALBUTEROL SULFATE 3 ML: .5; 3 SOLUTION RESPIRATORY (INHALATION) at 01:05

## 2019-05-08 RX ADMIN — ACETAMINOPHEN 650 MG: 325 TABLET, FILM COATED ORAL at 09:05

## 2019-05-08 RX ADMIN — IPRATROPIUM BROMIDE AND ALBUTEROL SULFATE 3 ML: .5; 3 SOLUTION RESPIRATORY (INHALATION) at 02:05

## 2019-05-08 RX ADMIN — ATORVASTATIN CALCIUM 20 MG: 20 TABLET, FILM COATED ORAL at 09:05

## 2019-05-08 RX ADMIN — APIXABAN 5 MG: 2.5 TABLET, FILM COATED ORAL at 09:05

## 2019-05-08 RX ADMIN — GABAPENTIN 800 MG: 400 CAPSULE ORAL at 09:05

## 2019-05-08 RX ADMIN — FLUCONAZOLE 200 MG: 100 TABLET ORAL at 09:05

## 2019-05-08 RX ADMIN — IPRATROPIUM BROMIDE AND ALBUTEROL SULFATE 3 ML: .5; 3 SOLUTION RESPIRATORY (INHALATION) at 08:05

## 2019-05-08 RX ADMIN — FLUTICASONE FUROATE AND VILANTEROL TRIFENATATE 1 PUFF: 100; 25 POWDER RESPIRATORY (INHALATION) at 08:05

## 2019-05-08 RX ADMIN — DOCUSATE SODIUM AND SENNOSIDES 1 TABLET: 8.6; 5 TABLET, FILM COATED ORAL at 09:05

## 2019-05-08 RX ADMIN — METOPROLOL SUCCINATE 12.5 MG: 25 TABLET, EXTENDED RELEASE ORAL at 09:05

## 2019-05-08 RX ADMIN — CLOSTRIDIUM TETANI TOXOID ANTIGEN (FORMALDEHYDE INACTIVATED), CORYNEBACTERIUM DIPHTHERIAE TOXOID ANTIGEN (FORMALDEHYDE INACTIVATED), BORDETELLA PERTUSSIS TOXOID ANTIGEN (GLUTARALDEHYDE INACTIVATED), BORDETELLA PERTUSSIS FILAMENTOUS HEMAGGLUTININ ANTIGEN (FORMALDEHYDE INACTIVATED), BORDETELLA PERTUSSIS PERTACTIN ANTIGEN, AND BORDETELLA PERTUSSIS FIMBRIAE 2/3 ANTIGEN 0.5 ML: 5; 2; 2.5; 5; 3; 5 INJECTION, SUSPENSION INTRAMUSCULAR at 03:05

## 2019-05-08 RX ADMIN — POLYETHYLENE GLYCOL 3350 17 G: 17 POWDER, FOR SOLUTION ORAL at 09:05

## 2019-05-08 RX ADMIN — CLONAZEPAM 0.5 MG: 0.5 TABLET ORAL at 09:05

## 2019-05-08 RX ADMIN — FUROSEMIDE 40 MG: 40 TABLET ORAL at 09:05

## 2019-05-08 RX ADMIN — PANTOPRAZOLE SODIUM 40 MG: 40 TABLET, DELAYED RELEASE ORAL at 09:05

## 2019-05-08 NOTE — CONSULTS
Nelly Tian [id number] is a 68 y.o. female who has been consulted for vancomycin dosing.    The patient has the following labs:     Date Creatinine (mg/dl)    BUN WBC Count   5/8/2019 Estimated Creatinine Clearance: 124.7 mL/min (based on SCr of 0.6 mg/dL). Lab Results   Component Value Date    BUN 11 05/08/2019     Lab Results   Component Value Date    WBC 6.40 05/08/2019        Current weight is 121.1 kg (266 lb 15.6 oz)    Pt is receiving vancomycin 1500 mg every 12 hours.  Vancomycin trough from 5/8 at 0302 was 15.3 mg/dL.  Target trough range is 10-15 mg/dL.   Trough was drawn on time and anticipate it is trending supratherapeutic. Pharmacy will decrease current dose.   The patient will be changed to a vancomycin dose of 1250 mg every 12 hours. A vancomycin trough has been ordered prior to 4th dose due 5/9 at 1530.      Patient will be followed by pharmacy for changes in renal function, toxicity, and efficacy.  Thank you for allowing us to participate in this patient's care.     Ammy Carrillo

## 2019-05-08 NOTE — PROGRESS NOTES
05/07/19 2014   Patient Assessment/Suction   Level of Consciousness (AVPU) alert   Respiratory Effort Normal;Unlabored   Expansion/Accessory Muscles/Retractions no use of accessory muscles;no retractions;expansion symmetric   All Lung Fields Breath Sounds clear   Cough Frequency infrequent   Cough Type nonproductive   PRE-TX-O2   O2 Device (Oxygen Therapy) room air   SpO2 (!) 93 %   Pulse Oximetry Type Intermittent   Pulse (!) 53   Resp 20   Aerosol Therapy   $ Aerosol Therapy Charges Aerosol Treatment   Respiratory Treatment Status (SVN) given   Treatment Route (SVN) mask   Patient Position (SVN) HOB elevated   Post Treatment Assessment (SVN) breath sounds unchanged   Signs of Intolerance (SVN) none   Breath Sounds Post-Respiratory Treatment   Throughout All Fields Post-Treatment no change   Post-treatment Heart Rate (beats/min) 54   Post-treatment Resp Rate (breaths/min) 18

## 2019-05-08 NOTE — PLAN OF CARE
· 5/8/2019 2:47:21 PM Completed  Marlyn Fabrice  · 5/8/2019 2:47:12 PM Note: Yes . That is fine. thanks  Marlyn Fabrice  · 5/8/2019 2:45:24 PM Note: We do apologize. But this patient cannot be seen until Friday. Will this date be ok?  Mandy [email or web address]  · 5/8/2019 2:40:11 PM Accepted  Mandy [email or web address]  · 5/8/2019 2:33:10 PM Under Review: Onsite Review  Mandy [email or web address]  · 5/8/2019 2:30:47 PM New: pt needs new HH setup. Pt would like to see [facility]. Thank you! Marlyn Avina [phone]  Marlyn Avina    The pt is aware that she will not be seen until Friday and she is ok with that. Pts discharge destination booked. Marlyn Avina, TALA     05/08/19 1447   Post-Acute Status   Post-Acute Authorization Home Health/Hospice   Home Health/Hospice Status Set-up Complete

## 2019-05-08 NOTE — PHYSICIAN QUERY
"PT Name: Nelly Tian  MR #: [id number]    Physician Query Form - Heart  Condition Clarification     CDS/: Mundo Bah RN               Contact information:   [email or web address]    This form is a permanent document in the medical record.     Query Date: May 8, 2019    By submitting this query, we are merely seeking further clarification of documentation. Please utilize your independent clinical judgment when addressing the question(s) below.    The medical record contains the following   Indicators     Supporting Clinical Findings Location in Medical Record   x  5/3  Labs     EF     x Radiology findings The cardiomediastinal silhouette is normal in appearance. 5/3   CXR- findings    Echo Results      "Ascites" documented      "SOB" or "JAMES" documented      "Hypoxia" documented     x Heart Failure documented  CHF (congestive heart failure)--No evidence of exacerbation   5/3  H&P   x "Edema" documented Edema (Fluid Accumulation): 2-->mild(bilateral legs and feet)      5/6   RD   x Diuretics/Meds Furosemide, 40 mg, oral; given on 5/7,  5/8  Metoprolol succinate, oral: given on 5/6-5/8    Home meds: lisinopril, metoprolol succinate, lasix    MAR  "    5/3  H&P    Treatment:      Other:      Heart failure (HF) can be acute, chronic or both. It is generally further specificed as systolic, diastolic, or combined. Lastly, it is important to identify an underlying etiology if known or suspected.     Common clues to acute exacerbation:  Rapidly progressive symptoms (w/in 2 weeks of presentation), using IV diuretics to treat, using supplemental O2, pulmonary edema on Xray, MI w/in 4 weeks, and/or BNP >500    Systolic Heart Failure: is defined as chart documentation of a left ventricular ejection fraction (LVEF) less than 40%     Diastolic Heart Failure: is defined as a left ventricular ejection fraction (LVEF) greater than 40%   +      Evidence of diastolic dysfunction on echocardiography OR "    Right heart catheterization wedge pressure above 12 mm Hg OR    Left heart catheterization left ventricular end diastolic pressure 18 mm Hg or above.    References: *American Heart Association    The clinical guidelines noted below are only system guidelines, and do not replace the providers clinical judgment.     Provider, please specify the diagnosis associated with above clinical findings                        [   ] Chronic Diastolic Heart Failure - Pre-existing diastolic HF diagnosis.  EF > 40%  without  acute HF symptoms documented  [   ] Other Type of Heart Failure (please specify type): _________________________  [   ] Other (please specify): ___________________________________  [  ] Clinically Undetermined                          Please document in your progress notes daily for the duration of treatment until resolved and include in your discharge summary.

## 2019-05-08 NOTE — PT/OT/SLP PROGRESS
Physical Therapy      Patient Name:  Nelly Tian   MRN:  [id number]    Patient not seen today secondary to Patient unwilling to participate(reports having been up several times today.). Will follow-up 5/9/19.    Prema Sher PTA

## 2019-05-08 NOTE — PLAN OF CARE
Scheduled hospital f/u with PCP office; placed on AVS.       05/08/19 1433   Discharge Reassessment   Assessment Type Discharge Planning Reassessment

## 2019-05-08 NOTE — NURSING
Discharge instructions reviewed with pt and daughter, understanding verbalized. PIV dc, catheter intact. Tele box returned. All questions answered. Pt refused transport or nurse to bring pt down- daughter brought pt down in hospital wheelchair. Pt escorted off unit with nurse at side. NAD noted.

## 2019-05-08 NOTE — PLAN OF CARE
Problem: Adult Inpatient Plan of Care  Goal: Plan of Care Review  Outcome: Ongoing (interventions implemented as appropriate)  Patient receiving aerosol tx via duoneb now and q6hr and Breo mdi x 1 puff now and qday.  Hr 62 and 02 saturation 97% on nc at 2lpm.

## 2019-05-08 NOTE — CONSULTS
Nelly Tian [id number] is a 68 y.o. female who had been consulted for vancomycin dosing.    Vancomycin has been discontinued.  Pharmacy consult for vancomycin dosing in no longer required.      Thank you for allowing us to participate in this patient's care.     Ambrocio Guillen, PharmD

## 2019-05-08 NOTE — PLAN OF CARE
05/08/19 1500   Final Note   Assessment Type Final Discharge Note   Anticipated Discharge Disposition Home-Health   Hospital Follow Up  Appt(s) scheduled? Yes

## 2019-05-08 NOTE — PLAN OF CARE
The prescribed Cefadroxil is covered under pt's insurance, and doesn't require a PA.       05/08/19 1431   Discharge Reassessment   Assessment Type Discharge Planning Reassessment

## 2019-05-08 NOTE — PLAN OF CARE
I met with the pt at bedside and she stated that she wants to use [facility] and use [facility]. I updated Dr. Davies that I needed HH orders. I will send to [facility] for processing once received. Pt choice disclosure signed and placed in pts blue folder.   Marlyn Avina, TALA     05/08/19 6125   Post-Acute Status   Post-Acute Authorization Home Health/Hospice   Home Health/Hospice Status Referrals Sent

## 2019-05-09 ENCOUNTER — PATIENT OUTREACH (OUTPATIENT)
Dept: ADMINISTRATIVE | Facility: CLINIC | Age: 69
End: 2019-05-09

## 2019-05-09 LAB
BACTERIA BLD CULT: NORMAL

## 2019-05-09 NOTE — PROGRESS NOTES
C3 nurse attempted to contact patient. No answer. The following message was left for the patient to return the call:  Good afternoon  I am a nurse calling on behalf of [facility] from the [facility].  This is a Transitional Care Call for Nelly Tian. When you have a moment please contact us at [phone] or [phone] Monday through Friday, between the hours of 8 am to 4 pm. We look forward to speaking with you. On behalf of [facility] have a nice day.    The patient has a scheduled HOSFU appointment with Tamy LEDEZMA on 5/16/19 @ 9:40am. Message sent to Physician staff.

## 2019-05-10 ENCOUNTER — TELEPHONE (OUTPATIENT)
Dept: MEDSURG UNIT | Facility: HOSPITAL | Age: 69
End: 2019-05-10

## 2019-05-10 NOTE — DISCHARGE SUMMARY
[facility] Medicine  Discharge Summary      Patient Name: Nelly Tian  MRN: [id number]  Admission Date: 5/3/2019  Hospital Length of Stay: 5 days  Discharge Date and Time: 5/8/2019  3:47 PM  Attending Physician: No att. providers found   Discharging Provider: Micki Davies MD  Primary Care Provider: Constantine Bird MD      HPI:   Ms. Tian is a 67 yo lady who with pmhx of COPD, CHF, HTN, DM2, A fib, MI, morbid obesity, recurrent panniculitis who presented to the ED with erythema and swelling of left abd wall x 1 day. Daughter reports she was in her normal state of health until this morning where she starting having shaking chills and redness/pain/swelling around left abd wall. Known hernia on left. No other complaints. Patient sees wound care in outpatient setting. In the ED, she was febrile. Elevated WBC at 17 with neutrophilia. Lactate 2.6. Given 1.5L NS and vancomycin & clindamycin. Medicine was consulted for admission.     * No surgery found *      Hospital Course:   Patient was admitted to the hospital medicine service for abdominal panniculitis.  She was started on IV vancomycin and clindamycin and Infectious Disease was consulted.  Her anticoagulation was held pending imaging studies for abscess.  Ultrasound did not show abscess so anticoagulation was restarted.  She initially has metabolic encephalopathy which was likely secondary to sepsis and resolved quickly.  Physical therapy and occupational therapy were consulted.  She was discharged on clindamycin for 7 days, wound care, and preventive dose of Duricef per ID recommendations.  Home health will follow also.     Consults:   Consults (From admission, onward)        Status Ordering Provider     Inpatient consult to Infectious Diseases  Once     Provider:  Opal Jacobsen MD    Completed NESSA CORONA          * Abdominal wall cellulitis  - Recurrent infections per history and chart review  - Continue Vancomycin and  Clindamycin - white count has normalized  - F/u blood cultures - no growth so far  - CT scan of the abdominal wall could not be done due to habitus.  Soft tissue ultrasound with no evidence of abscess  - ID following, appreciate recommendations.  - PRN Acetaminophen.  Ibuprofen scheduled for 2 days per Infectious Disease    Chronic prescription opiate use  Norco  #90 filled on 4/27    Chronically on benzodiazepine therapy  - Patient filled Clonazepam 0.5 mg #60 on 4/27  - Was held secondary to encephalopathy  - Will resume slowly.    Other nonthrombocytopenic purpura  Noted. No issues.    Type 2 diabetes mellitus, without long-term current use of insulin  - A1c 5.6 in Jan 2019  - Low dose sliding scale      CHF (congestive heart failure)  - No evidence of exacerbation.  Restart home Lasix.    COPD (chronic obstructive pulmonary disease)  - No evidence of exacerbation  - Bronchodilators PRN  - Home meds    Hypertension associated with diabetes  - No acute issues  - Home meds    Chronic atrial fibrillation  - Continue beta blocker, continue Eliquis     Long term current use of anticoagulant therapy  - continue Eliquis      Final Active Diagnoses:    Diagnosis Date Noted POA    PRINCIPAL PROBLEM:  Abdominal wall cellulitis [L03.311] 05/04/2019 Yes    Encephalopathy, metabolic [G93.41] 05/04/2019 Yes    Chronically on benzodiazepine therapy [Z79.899] 05/04/2019 Not Applicable    Chronic prescription opiate use [Z79.891] 05/04/2019 Not Applicable    Other nonthrombocytopenic purpura [D69.2] 04/25/2019 Yes    Type 2 diabetes mellitus, without long-term current use of insulin [E11.9] 09/10/2018 Yes    CHF (congestive heart failure) [I50.9] 12/19/2017 Yes    COPD (chronic obstructive pulmonary disease) [J44.9] 04/05/2017 Yes    Hypertension associated with diabetes [E11.59, I10] 01/19/2016 Yes    Chronic atrial fibrillation [I48.2] 11/10/2015 Yes    Long term current use of anticoagulant therapy [Z79.01]  10/03/2012 Not Applicable      Problems Resolved During this Admission:       Discharged Condition: fair    Disposition: Home-Health Care [facility]    Follow Up:  Follow-up Information     Constantine Bird MD.    Specialty:  Family Medicine  Contact information:  [address]  [place] [address]  [phone]             Opal Jacobsen MD.    Specialty:  Infectious Diseases  Contact information:  [address]  [address]  [place] [address]  [phone]             [facility].    Specialty:  Home Health Services  Why:  Home Health  Contact information:  [address] [facility] [address]  [phone]               Patient Instructions:      Referral to Home health   Referral Priority: Routine Referral Type: Home Health Care   Referral Reason: Specialty Services Required   Requested Specialty: Home Health Services   Number of Visits Requested: 1     Activity as tolerated     Activity as tolerated       Significant Diagnostic Studies:   Results for NYLA GIBBS (MRN [id number]) as of 5/9/2019 21:38   Ref. Range 5/8/2019 03:02   WBC Latest Ref Range: 3.90 - 12.70 K/uL 6.40   RBC Latest Ref Range: 4.00 - 5.40 M/uL 3.74 (L)   Hemoglobin Latest Ref Range: 12.0 - 16.0 g/dL 10.6 (L)   Hematocrit Latest Ref Range: 37.0 - 48.5 % 33.5 (L)   MCV Latest Ref Range: 82 - 98 fL 90   MCH Latest Ref Range: 27.0 - 31.0 pg 28.5   MCHC Latest Ref Range: 32.0 - 36.0 g/dL 31.8 (L)   RDW Latest Ref Range: 11.5 - 14.5 % 17.9 (H)   Platelets Latest Ref Range: 150 - 350 K/uL 166   Results for NYLA GIBBS (MRN [id number]) as of 5/9/2019 21:38   Ref. Range 5/4/2019 11:40   Blood Culture, Routine Unknown No growth after 5...     Impression       No abdominal wall abscess appreciated in the area of the patient's left lower quadrant abdominal wall panniculitis/cellulitis.      Electronically signed by: Jesus Corral MD  Date: 05/05/2019  Time: 18:02   Results for NYLA GIBBS (MRN [id number]) as of 5/9/2019 21:38   Ref.  Range 5/8/2019 03:02   Sodium Latest Ref Range: 136 - 145 mmol/L 134 (L)   Potassium Latest Ref Range: 3.5 - 5.1 mmol/L 3.8   Chloride Latest Ref Range: 95 - 110 mmol/L 97   CO2 Latest Ref Range: 23 - 29 mmol/L 29   Anion Gap Latest Ref Range: 8 - 16 mmol/L 8   BUN, Bld Latest Ref Range: 8 - 23 mg/dL 11   Creatinine Latest Ref Range: 0.5 - 1.4 mg/dL 0.6   eGFR if non African American Latest Ref Range: >60 mL/min/1.73 m^2 >60   eGFR if  Latest Ref Range: >60 mL/min/1.73 m^2 >60   Glucose Latest Ref Range: 70 - 110 mg/dL 117 (H)   Calcium Latest Ref Range: 8.7 - 10.5 mg/dL 9.0   CRP Latest Ref Range: 0.0 - 8.2 mg/L 71.4 (H)       Pending Diagnostic Studies:     None         Medications:  Reconciled Home Medications:      Medication List      START taking these medications    cefadroxil 500 MG Cap  Commonly known as:  DURICEF  Take 1 capsule (500 mg total) by mouth every 12 (twelve) hours. For flare up x 1 week  Take one table daily for prevention     clindamycin 300 MG capsule  Commonly known as:  CLEOCIN  Take 1 capsule (300 mg total) by mouth every 6 (six) hours. for 7 days        CHANGE how you take these medications    ascorbic acid (vitamin C) 500 MG tablet  Commonly known as:  VITAMIN C  Take 1 tablet (500 mg total) by mouth every evening.  What changed:    · how much to take  · when to take this        CONTINUE taking these medications    * albuterol 0.63 mg/3 mL Nebu  Commonly known as:  ACCUNEB  Take 3 mLs (0.63 mg total) by nebulization every 6 (six) hours as needed. Rescue     * albuterol 90 mcg/actuation inhaler  Commonly known as:  PROVENTIL/VENTOLIN HFA  INHALE 2 PUFFS INTO THE LUNGS EVERY 6 (SIX) HOURS AS NEEDED FOR RESCUE     apixaban 5 mg Tab  Commonly known as:  ELIQUIS  Take 1 tablet (5 mg total) by mouth 2 (two) times daily.     atorvastatin 20 MG tablet  Commonly known as:  LIPITOR  Take 1 tablet (20 mg total) by mouth once daily.     BREO ELLIPTA 100-25 mcg/dose diskus  inhaler  Generic drug:  fluticasone furoate-vilanterol  INHALE 1 PUFF INTO THE LUNGS ONCE DAILY.     budesonide 0.5 mg/2 mL nebulizer solution  Commonly known as:  PULMICORT  Take 2 mLs (0.5 mg total) by nebulization once daily. Controller     clonazePAM 0.5 MG tablet  Commonly known as:  KLONOPIN  Take 1 tablet (0.5 mg total) by mouth 2 (two) times daily as needed for Anxiety. TAKE ONE TABLET BY MOUTH TWO TIMES A DAY AS NEEDED     fluticasone propionate 50 mcg/actuation nasal spray  Commonly known as:  FLONASE  1 spray (50 mcg total) by Each Nare route once daily.     furosemide 40 MG tablet  Commonly known as:  LASIX  TAKE 1 TABLET ONE TIME DAILY  FOR  FLUID  OVERLOAD     gabapentin 800 MG tablet  Commonly known as:  NEURONTIN  Take 1 tablet (800 mg total) by mouth 3 (three) times daily.     HYDROcodone-acetaminophen  mg per tablet  Commonly known as:  NORCO  Take 1 tablet by mouth every 8 (eight) hours as needed for Pain (Do not take more than 3 a day. Do not mix with other narcotics.).     levalbuterol 0.63 mg/3 mL nebulizer solution  Commonly known as:  XOPENEX  INHALE THE CONTENTS OF 1 VIAL BY NEBULIZATION 4 times  DAILY.    DX: J43.9     lisinopril 20 MG tablet  Commonly known as:  PRINIVIL,ZESTRIL  Take 1 tablet (20 mg total) by mouth once daily.     loratadine 10 mg tablet  Commonly known as:  CLARITIN  Take 1 tablet (10 mg total) by mouth once daily.     metFORMIN 500 MG tablet  Commonly known as:  GLUCOPHAGE  TAKE 1 TABLET (500 MG TOTAL) BY MOUTH 2 (TWO) TIMES DAILY WITH MEALS.     metoprolol succinate 25 MG 24 hr tablet  Commonly known as:  TOPROL-XL  Take 0.5 tablets (12.5 mg total) by mouth once daily.     multivitamin tablet  Commonly known as:  THERAGRAN  Take 1 tablet by mouth once daily.     nicotine 14 mg/24 hr  Commonly known as:  NICODERM CQ  Place 1 patch onto the skin once daily. for 14 days  Start taking on:  6/6/2019     nicotine polacrilex 2 MG Lozg  Take 1 lozenge (2 mg total) by  mouth as needed.     nystatin powder  Commonly known as:  NYSTOP  Apply topically 2 (two) times daily.     nystatin-triamcinolone cream  Commonly known as:  MYCOLOG II  Apply topically 3 (three) times daily.     omega-3 acid ethyl esters 1 gram capsule  Commonly known as:  LOVAZA  2 (two) times daily.     omeprazole 20 MG capsule  Commonly known as:  PRILOSEC  Take 1 capsule (20 mg total) by mouth once daily.     ondansetron 8 MG Tbdl  Commonly known as:  ZOFRAN-ODT  Take 1 tablet (8 mg total) by mouth every 6 (six) hours as needed.     polyethylene glycol 17 gram/dose powder  Commonly known as:  GLYCOLAX     potassium chloride SA 20 MEQ tablet  Commonly known as:  KLOR-CON M20  Take 1 tablet (20 mEq total) by mouth once daily.     senna-docusate 8.6-50 mg 8.6-50 mg per tablet  Commonly known as:  PERICOLACE  Take 1 tablet by mouth 2 (two) times daily as needed for Constipation.     spironolactone 25 MG tablet  Commonly known as:  ALDACTONE  Take 1 tablet (25 mg total) by mouth once daily.     traZODone 100 MG tablet  Commonly known as:  DESYREL  Take 1.5 tablets (150 mg total) by mouth every evening.         * This list has 2 medication(s) that are the same as other medications prescribed for you. Read the directions carefully, and ask your doctor or other care provider to review them with you.                Indwelling Lines/Drains at time of discharge:   Lines/Drains/Airways          None          Time spent on the discharge of patient: 34 minutes  Patient was seen and examined on the date of discharge and determined to be suitable for discharge.         Micki Davies MD  Department of Hospital Medicine  [facility]

## 2019-05-13 NOTE — PHYSICIAN QUERY
"PT Name: Nelly Tian  MR #: [id number]    Physician Query Form - Heart  Condition Clarification     CDS/: Mundo Bah RN               Contact information:   [email or web address]    This form is a permanent document in the medical record.     Query Date: May 13, 2019    By submitting this query, we are merely seeking further clarification of documentation. Please utilize your independent clinical judgment when addressing the question(s) below.    The medical record contains the following   Indicators     Supporting Clinical Findings Location in Medical Record   x  5/3  Labs     EF     x Radiology findings The cardiomediastinal silhouette is normal in appearance. 5/3   CXR- findings    Echo Results      "Ascites" documented      "SOB" or "JAMES" documented      "Hypoxia" documented     x Heart Failure documented  CHF (congestive heart failure)--No evidence of exacerbation   5/3  H&P   x "Edema" documented Edema (Fluid Accumulation): 2-->mild(bilateral legs and feet)      5/6   RD   x Diuretics/Meds Furosemide, 40 mg, oral; given on 5/7,  5/8  Metoprolol succinate, oral: given on 5/6-5/8    Home meds: lisinopril, metoprolol succinate, lasix    MAR  "    5/3  H&P    Treatment:      Other:      Heart failure (HF) can be acute, chronic or both. It is generally further specificed as systolic, diastolic, or combined. Lastly, it is important to identify an underlying etiology if known or suspected.     Common clues to acute exacerbation:  Rapidly progressive symptoms (w/in 2 weeks of presentation), using IV diuretics to treat, using supplemental O2, pulmonary edema on Xray, MI w/in 4 weeks, and/or BNP >500    Systolic Heart Failure: is defined as chart documentation of a left ventricular ejection fraction (LVEF) less than 40%     Diastolic Heart Failure: is defined as a left ventricular ejection fraction (LVEF) greater than 40%   +      Evidence of diastolic dysfunction on echocardiography OR "    Right heart catheterization wedge pressure above 12 mm Hg OR    Left heart catheterization left ventricular end diastolic pressure 18 mm Hg or above.    References: *American Heart Association    The clinical guidelines noted below are only system guidelines, and do not replace the providers clinical judgment.     Provider, please specify the diagnosis associated with above clinical findings                        [   ] Chronic Diastolic Heart Failure - Pre-existing diastolic HF diagnosis.  EF > 40%  without  acute HF symptoms documented  [   ] Other Type of Heart Failure (please specify type): _________________________  [ x  ] Other (please specify): _no evidence of systolic or diastolic failure on previous echo_______________________________  [   ] Clinically Undetermined    Provider, please elian your response before signing this document.  Thank you.                          Please document in your progress notes daily for the duration of treatment until resolved and include in your discharge summary.

## 2019-05-16 ENCOUNTER — HOSPITAL ENCOUNTER (OUTPATIENT)
Dept: RADIOLOGY | Facility: CLINIC | Age: 69
Discharge: HOME | End: 2019-05-16
Attending: NURSE PRACTITIONER
Payer: MEDICARE

## 2019-05-16 ENCOUNTER — OFFICE VISIT (OUTPATIENT)
Dept: FAMILY MEDICINE | Facility: CLINIC | Age: 69
End: 2019-05-16
Payer: MEDICARE

## 2019-05-16 VITALS
SYSTOLIC BLOOD PRESSURE: 96 MMHG | BODY MASS INDEX: 39.03 KG/M2 | TEMPERATURE: 99 F | DIASTOLIC BLOOD PRESSURE: 54 MMHG | HEIGHT: 68 IN | WEIGHT: 257.5 LBS | HEART RATE: 62 BPM

## 2019-05-16 DIAGNOSIS — I50.9 CONGESTIVE HEART FAILURE, UNSPECIFIED HF CHRONICITY, UNSPECIFIED HEART FAILURE TYPE: ICD-10-CM

## 2019-05-16 DIAGNOSIS — S31.109D OPEN WOUND OF LATERAL ABDOMINAL WALL, SUBSEQUENT ENCOUNTER: ICD-10-CM

## 2019-05-16 DIAGNOSIS — E11.59 HYPERTENSION ASSOCIATED WITH DIABETES: ICD-10-CM

## 2019-05-16 DIAGNOSIS — E66.01 SEVERE OBESITY (BMI 35.0-35.9 WITH COMORBIDITY): ICD-10-CM

## 2019-05-16 DIAGNOSIS — K21.9 GASTROESOPHAGEAL REFLUX DISEASE, ESOPHAGITIS PRESENCE NOT SPECIFIED: ICD-10-CM

## 2019-05-16 DIAGNOSIS — J42 CHRONIC BRONCHITIS, UNSPECIFIED CHRONIC BRONCHITIS TYPE: ICD-10-CM

## 2019-05-16 DIAGNOSIS — E11.40 TYPE 2 DIABETES MELLITUS WITH DIABETIC NEUROPATHY, WITHOUT LONG-TERM CURRENT USE OF INSULIN: ICD-10-CM

## 2019-05-16 DIAGNOSIS — L03.311 CELLULITIS, ABDOMINAL WALL: Primary | ICD-10-CM

## 2019-05-16 DIAGNOSIS — J44.1 COPD EXACERBATION: ICD-10-CM

## 2019-05-16 DIAGNOSIS — I15.2 HYPERTENSION ASSOCIATED WITH DIABETES: ICD-10-CM

## 2019-05-16 PROCEDURE — 71046 XR CHEST PA AND LATERAL: ICD-10-PCS | Mod: 26,,, | Performed by: RADIOLOGY

## 2019-05-16 PROCEDURE — 99999 PR PBB SHADOW E&M-EST. PATIENT-LVL V: CPT | Mod: PBBFAC,,, | Performed by: NURSE PRACTITIONER

## 2019-05-16 PROCEDURE — 99496 TRANSJ CARE MGMT HIGH F2F 7D: CPT | Mod: S$GLB,,, | Performed by: NURSE PRACTITIONER

## 2019-05-16 PROCEDURE — 99999 PR PBB SHADOW E&M-EST. PATIENT-LVL V: ICD-10-PCS | Mod: PBBFAC,,, | Performed by: NURSE PRACTITIONER

## 2019-05-16 PROCEDURE — 99496 TRANSITIONAL CARE MANAGE SERVICE 7 DAY DISCHARGE: ICD-10-PCS | Mod: S$GLB,,, | Performed by: NURSE PRACTITIONER

## 2019-05-16 PROCEDURE — 71046 X-RAY EXAM CHEST 2 VIEWS: CPT | Mod: TC,FY,PO

## 2019-05-16 PROCEDURE — 71046 X-RAY EXAM CHEST 2 VIEWS: CPT | Mod: 26,,, | Performed by: RADIOLOGY

## 2019-05-16 RX ORDER — OMEPRAZOLE 40 MG/1
40 CAPSULE, DELAYED RELEASE ORAL DAILY
Qty: 90 CAPSULE | Refills: 3 | Status: SHIPPED | OUTPATIENT
Start: 2019-05-16 | End: 2020-03-10 | Stop reason: SDUPTHER

## 2019-05-16 NOTE — PROGRESS NOTES
Subjective:       Patient ID: Nelly Tian is a 68 y.o. female.    Chief Complaint: Transitional Care (abdominal wall cellulitis)    Transitional Care Note    Family and/or Caretaker present at visit?  No.  Diagnostic tests reviewed/disposition: No diagnosic tests pending after this hospitalization.  Disease/illness education: cellulitis  Home health/community services discussion/referrals: Patient has home health established at [facility].   Establishment or re-establishment of referral orders for community resources: No other necessary community resources.   Discussion with other health care providers: No discussion with other health care providers necessary.    Ms. Tian presents to the clinic for transitional care for hospitalization at [facility] from 5/3-5/8 for cellulitis of abdominal wall. She has chronic wounds to the abdominal wall followed by [facility]. She is currently taking antibiotics prescribed on d/c.  She denies fevers.  She has Lasix 40 mg which she takes PRN fluid overload, and she has been taking this for swelling to the abdominal wall, however she is not having any leg swelling.  Her BP today is low but she denies lightheadedness, dizziness, fever.  She has not scheduled her f/u with Dr. Jacobsen.  She does have appt with [facility] wound care for next week.  She is currently working on weight loss, eating more salads.          Review of Systems   Constitutional: Negative for chills and fever.   HENT: Negative for congestion, ear pain and sinus pressure.    Respiratory: Negative for cough and shortness of breath.    Cardiovascular: Negative for chest pain, palpitations and leg swelling.   Gastrointestinal: Negative for abdominal pain, constipation and diarrhea.        +acid reflux   Skin: Positive for wound (chronic). Negative for rash.   Neurological: Negative for dizziness, syncope and light-headedness.       Objective:      Physical Exam   Constitutional: She is oriented to person, place,  and time. She appears well-nourished. No distress.   HENT:   Head: Normocephalic and atraumatic.   Right Ear: External ear normal.   Left Ear: External ear normal.   Eyes: Right eye exhibits no discharge. Left eye exhibits no discharge.   Cardiovascular: Normal rate and regular rhythm. Exam reveals no gallop and no friction rub.   No murmur heard.  No leg swelling   Pulmonary/Chest: Effort normal and breath sounds normal. No respiratory distress. She has no wheezes. She has no rales.   Neurological: She is alert and oriented to person, place, and time. Coordination normal.   Skin: Skin is warm and dry.        Psychiatric: She has a normal mood and affect. Her behavior is normal. Thought content normal.   Vitals reviewed.          Current Outpatient Medications:     albuterol (ACCUNEB) 0.63 mg/3 mL Nebu, Take 3 mLs (0.63 mg total) by nebulization every 6 (six) hours as needed. Rescue, Disp: 75 mL, Rfl: 11    albuterol (PROVENTIL/VENTOLIN HFA) 90 mcg/actuation inhaler, INHALE 2 PUFFS INTO THE LUNGS EVERY 6 (SIX) HOURS AS NEEDED FOR RESCUE, Disp: 54 g, Rfl: 3    apixaban (ELIQUIS) 5 mg Tab, Take 1 tablet (5 mg total) by mouth 2 (two) times daily., Disp: 180 tablet, Rfl: 3    ascorbic acid, vitamin C, (VITAMIN C) 500 MG tablet, Take 1 tablet (500 mg total) by mouth every evening. (Patient taking differently: Take 1,000 mg by mouth 2 (two) times daily. ), Disp: , Rfl:     atorvastatin (LIPITOR) 20 MG tablet, Take 1 tablet (20 mg total) by mouth once daily., Disp: 90 tablet, Rfl: 3    BREO ELLIPTA 100-25 mcg/dose diskus inhaler, INHALE 1 PUFF INTO THE LUNGS ONCE DAILY., Disp: 60 each, Rfl: 2    budesonide (PULMICORT) 0.5 mg/2 mL nebulizer solution, Take 2 mLs (0.5 mg total) by nebulization once daily. Controller, Disp: 180 mL, Rfl: 4    cefadroxil (DURICEF) 500 MG Cap, Take 1 capsule (500 mg total) by mouth every 12 (twelve) hours. For flare up x 1 week Take one table daily for prevention, Disp: 60 capsule, Rfl:  3    clonazePAM (KLONOPIN) 0.5 MG tablet, Take 1 tablet (0.5 mg total) by mouth 2 (two) times daily as needed for Anxiety. TAKE ONE TABLET BY MOUTH TWO TIMES A DAY AS NEEDED, Disp: 60 tablet, Rfl: 0    fluticasone (FLONASE) 50 mcg/actuation nasal spray, 1 spray (50 mcg total) by Each Nare route once daily., Disp: 1 Bottle, Rfl: 2    furosemide (LASIX) 40 MG tablet, TAKE 1 TABLET ONE TIME DAILY  FOR  FLUID  OVERLOAD, Disp: 90 tablet, Rfl: 3    gabapentin (NEURONTIN) 800 MG tablet, Take 1 tablet (800 mg total) by mouth 3 (three) times daily., Disp: 270 tablet, Rfl: 3    HYDROcodone-acetaminophen (NORCO)  mg per tablet, Take 1 tablet by mouth every 8 (eight) hours as needed for Pain (Do not take more than 3 a day. Do not mix with other narcotics.)., Disp: 90 tablet, Rfl: 0    levalbuterol (XOPENEX) 0.63 mg/3 mL nebulizer solution, INHALE THE CONTENTS OF 1 VIAL BY NEBULIZATION 4 times  DAILY.    DX: J43.9, Disp: 792 mL, Rfl: 3    lisinopril (PRINIVIL,ZESTRIL) 20 MG tablet, Take 1 tablet (20 mg total) by mouth once daily., Disp: 90 tablet, Rfl: 1    loratadine (CLARITIN) 10 mg tablet, Take 1 tablet (10 mg total) by mouth once daily., Disp: , Rfl: 0    metFORMIN (GLUCOPHAGE) 500 MG tablet, TAKE 1 TABLET (500 MG TOTAL) BY MOUTH 2 (TWO) TIMES DAILY WITH MEALS., Disp: 180 tablet, Rfl: 3    metoprolol succinate (TOPROL-XL) 25 MG 24 hr tablet, Take 0.5 tablets (12.5 mg total) by mouth once daily., Disp: 45 tablet, Rfl: 3    multivitamin (THERAGRAN) tablet, Take 1 tablet by mouth once daily., Disp: , Rfl:     [START ON 6/6/2019] nicotine (NICODERM CQ) 14 mg/24 hr, Place 1 patch onto the skin once daily. for 14 days, Disp: 14 patch, Rfl: 3    nicotine polacrilex 2 MG Lozg, Take 1 lozenge (2 mg total) by mouth as needed., Disp: , Rfl: 0    nystatin (NYSTOP) powder, Apply topically 2 (two) times daily., Disp: 120 g, Rfl: 1    nystatin-triamcinolone (MYCOLOG II) cream, Apply topically 3 (three) times daily.,  Disp: 30 g, Rfl: 1    omega-3 acid ethyl esters (LOVAZA) 1 gram capsule, 2 (two) times daily. , Disp: , Rfl:     omeprazole (PRILOSEC) 40 MG capsule, Take 1 capsule (40 mg total) by mouth once daily., Disp: 90 capsule, Rfl: 3    ondansetron (ZOFRAN-ODT) 8 MG TbDL, Take 1 tablet (8 mg total) by mouth every 6 (six) hours as needed., Disp: 20 tablet, Rfl: 3    polyethylene glycol (GLYCOLAX) 17 gram/dose powder, , Disp: , Rfl:     potassium chloride SA (KLOR-CON M20) 20 MEQ tablet, Take 1 tablet (20 mEq total) by mouth once daily., Disp: 30 tablet, Rfl: 6    senna-docusate 8.6-50 mg (PERICOLACE) 8.6-50 mg per tablet, Take 1 tablet by mouth 2 (two) times daily as needed for Constipation., Disp: , Rfl:     spironolactone (ALDACTONE) 25 MG tablet, Take 1 tablet (25 mg total) by mouth once daily., Disp: 30 tablet, Rfl: 11    traZODone (DESYREL) 100 MG tablet, Take 1.5 tablets (150 mg total) by mouth every evening., Disp: 90 tablet, Rfl: 1  Assessment:       1. Cellulitis, abdominal wall    2. Open wound of lateral abdominal wall, subsequent encounter    3. Gastroesophageal reflux disease, esophagitis presence not specified    4. Chronic bronchitis, unspecified chronic bronchitis type    5. Hypertension associated with diabetes    6. Congestive heart failure, unspecified HF chronicity, unspecified heart failure type    7. Type 2 diabetes mellitus with diabetic neuropathy, without long-term current use of insulin    8. Severe obesity (BMI 35.0-35.9 with comorbidity)        Plan:       Cellulitis, abdominal wall  Resolved. Continue abx as prescribed. Urged to schedule f/u with ID.    Open wound of lateral abdominal wall, subsequent encounter  Follow up [facility]    Gastroesophageal reflux disease, esophagitis presence not specified  Uncontrolled.  Increase:  -     omeprazole (PRILOSEC) 40 MG capsule; Take 1 capsule (40 mg total) by mouth once daily.  Dispense: 90 capsule; Refill: 3  Avoid spicy foods, acidic  foods.  Do not lie down for 3 hours after eating.    Chronic bronchitis, unspecified chronic bronchitis type  -     Six Minute Walk Test to qualify for Home Oxygen; Future    Hypertension associated with diabetes  BP low today, I suspect due to overuse of Lasix.  Hold Lasix 2-3 days, increase water intake, monitor BP at home.  Call if BP drops any lower.    Congestive heart failure, unspecified HF chronicity, unspecified heart failure type  Stable, follow up cardiology    Type 2 diabetes mellitus with diabetic neuropathy, without long-term current use of insulin  Stable, continue current medication.    Severe obesity  Patient readiness: acceptance and barriers:none    During the course of the visit the patient was educated and counseled about the following:     Diabetes:  Discussed general issues about diabetes pathophysiology and management.  Hypertension:   Medication: no change.  Obesity:   General weight loss/lifestyle modification strategies discussed (elicit support from others; identify saboteurs; non-food rewards, etc).    Goals: Diabetes: Maintain Hemoglobin A1C below 7, Hypertension: Reduce Blood Pressure and Obesity: Reduce calorie intake and BMI    Did patient meet goals/outcomes: Yes    The following self management tools provided: declined    Patient Instructions (the written plan) was given to the patient/family.     Time spent with patient: 30 minutes    Barriers to medications present (no )    Adverse reactions to current medications (no)    Over the counter medications reviewed (Yes)        RTC 3 mos

## 2019-05-16 NOTE — PATIENT INSTRUCTIONS
Watch blood pressure today at home and call if it is lower than 95/60.  Skip the Lasix for the next 2-3 days until BP is normal.

## 2019-05-23 ENCOUNTER — LAB VISIT (OUTPATIENT)
Dept: LAB | Facility: HOSPITAL | Age: 69
End: 2019-05-23
Attending: INTERNAL MEDICINE
Payer: MEDICARE

## 2019-05-23 DIAGNOSIS — M43.12 SPONDYLOLISTHESIS OF CERVICAL REGION: ICD-10-CM

## 2019-05-23 DIAGNOSIS — Z86.2 HISTORY OF IRON DEFICIENCY ANEMIA: ICD-10-CM

## 2019-05-23 DIAGNOSIS — M51.36 LUMBAR DEGENERATIVE DISC DISEASE: ICD-10-CM

## 2019-05-23 LAB
ALBUMIN SERPL BCP-MCNC: 3.7 G/DL (ref 3.5–5.2)
ALP SERPL-CCNC: 72 U/L (ref 55–135)
ALT SERPL W/O P-5'-P-CCNC: 16 U/L (ref 10–44)
ANION GAP SERPL CALC-SCNC: 13 MMOL/L (ref 8–16)
AST SERPL-CCNC: 19 U/L (ref 10–40)
BASOPHILS # BLD AUTO: 0.1 K/UL (ref 0–0.2)
BASOPHILS NFR BLD: 1.9 % (ref 0–1.9)
BILIRUB SERPL-MCNC: 0.5 MG/DL (ref 0.1–1)
BUN SERPL-MCNC: 16 MG/DL (ref 8–23)
CALCIUM SERPL-MCNC: 10.4 MG/DL (ref 8.7–10.5)
CHLORIDE SERPL-SCNC: 101 MMOL/L (ref 95–110)
CO2 SERPL-SCNC: 28 MMOL/L (ref 23–29)
CREAT SERPL-MCNC: 0.8 MG/DL (ref 0.5–1.4)
DIFFERENTIAL METHOD: ABNORMAL
EOSINOPHIL # BLD AUTO: 0.1 K/UL (ref 0–0.5)
EOSINOPHIL NFR BLD: 2.2 % (ref 0–8)
ERYTHROCYTE [DISTWIDTH] IN BLOOD BY AUTOMATED COUNT: 17.1 % (ref 11.5–14.5)
EST. GFR  (AFRICAN AMERICAN): >60 ML/MIN/1.73 M^2
EST. GFR  (NON AFRICAN AMERICAN): >60 ML/MIN/1.73 M^2
GLUCOSE SERPL-MCNC: 86 MG/DL (ref 70–110)
HCT VFR BLD AUTO: 42.5 % (ref 37–48.5)
HGB BLD-MCNC: 13.8 G/DL (ref 12–16)
IRON SERPL-MCNC: 52 UG/DL (ref 30–160)
LYMPHOCYTES # BLD AUTO: 1.7 K/UL (ref 1–4.8)
LYMPHOCYTES NFR BLD: 24.6 % (ref 18–48)
MCH RBC QN AUTO: 29.4 PG (ref 27–31)
MCHC RBC AUTO-ENTMCNC: 32.3 G/DL (ref 32–36)
MCV RBC AUTO: 91 FL (ref 82–98)
MONOCYTES # BLD AUTO: 0.7 K/UL (ref 0.3–1)
MONOCYTES NFR BLD: 10.5 % (ref 4–15)
NEUTROPHILS # BLD AUTO: 4.2 K/UL (ref 1.8–7.7)
NEUTROPHILS NFR BLD: 60.8 % (ref 38–73)
PLATELET # BLD AUTO: 248 K/UL (ref 150–350)
PMV BLD AUTO: 8.7 FL (ref 9.2–12.9)
POTASSIUM SERPL-SCNC: 4.1 MMOL/L (ref 3.5–5.1)
PROT SERPL-MCNC: 7.8 G/DL (ref 6–8.4)
RBC # BLD AUTO: 4.68 M/UL (ref 4–5.4)
SATURATED IRON: 14 % (ref 20–50)
SODIUM SERPL-SCNC: 142 MMOL/L (ref 136–145)
TOTAL IRON BINDING CAPACITY: 380 UG/DL (ref 250–450)
TRANSFERRIN SERPL-MCNC: 257 MG/DL (ref 200–375)
WBC # BLD AUTO: 6.9 K/UL (ref 3.9–12.7)

## 2019-05-23 PROCEDURE — 83540 ASSAY OF IRON: CPT

## 2019-05-23 PROCEDURE — 80053 COMPREHEN METABOLIC PANEL: CPT

## 2019-05-23 PROCEDURE — 85025 COMPLETE CBC W/AUTO DIFF WBC: CPT

## 2019-05-23 PROCEDURE — 36415 COLL VENOUS BLD VENIPUNCTURE: CPT

## 2019-05-23 NOTE — TELEPHONE ENCOUNTER
Patient requesting a refill of the following medication:  Hydrocodone--LR--4/26/19  Clonazepam--LR--4/26/19  LOV--5/16/19 (CRUZITO Pablo)  FOV--8/16/19 (Dr. Bird)  Urine Toxicology--12/6/18  Pain Contract--12/6/18  Checked [place] Prescription Monitoring Program site. No unusual or abnormal behavior noted.

## 2019-05-24 RX ORDER — CLONAZEPAM 0.5 MG/1
0.5 TABLET ORAL 2 TIMES DAILY PRN
Qty: 60 TABLET | Refills: 0 | Status: SHIPPED | OUTPATIENT
Start: 2019-05-26 | End: 2019-06-25 | Stop reason: SDUPTHER

## 2019-05-24 RX ORDER — HYDROCODONE BITARTRATE AND ACETAMINOPHEN 10; 325 MG/1; MG/1
1 TABLET ORAL EVERY 8 HOURS PRN
Qty: 90 TABLET | Refills: 0 | Status: SHIPPED | OUTPATIENT
Start: 2019-05-26 | End: 2019-06-25 | Stop reason: SDUPTHER

## 2019-05-24 NOTE — PROGRESS NOTES
[facility] Medicine  Progress Note    Patient Name: Nelly Tian  MRN: [id number]  Patient Class: IP- Inpatient   Admission Date: 5/3/2019  Length of Stay: 5 days  Attending Physician: No att. providers found  Primary Care Provider: Constantine Bird MD        Subjective:     Principal Problem:Abdominal wall cellulitis    HPI:  Ms. Tian is a 67 yo lady who with pmhx of COPD, CHF, HTN, DM2, A fib, MI, morbid obesity, recurrent panniculitis who presented to the ED with erythema and swelling of left abd wall x 1 day. Daughter reports she was in her normal state of health until this morning where she starting having shaking chills and redness/pain/swelling around left abd wall. Known hernia on left. No other complaints. Patient sees wound care in outpatient setting. In the ED, she was febrile. Elevated WBC at 17 with neutrophilia. Lactate 2.6. Given 1.5L NS and vancomycin & clindamycin. Medicine was consulted for admission.     Hospital Course:  Patient was admitted to the hospital medicine service for abdominal panniculitis.  She was started on IV vancomycin and clindamycin and Infectious Disease was consulted.  Her anticoagulation was held pending imaging studies for abscess.  Ultrasound did not show abscess so anticoagulation was restarted.  She initially has metabolic encephalopathy which was likely secondary to sepsis and resolved quickly.  Physical therapy and occupational therapy were consulted.  She was discharged on clindamycin for 7 days, wound care, and preventive dose of Duricef per ID recommendations.  Home health will follow also.    Interval History:  Patient seen and examined.  Reports pain is controlled.  Reports constipation.  Plan of care discussed with patient and daughter.    Review of Systems   Constitutional: Negative for chills and fever.   Respiratory: Negative for apnea and chest tightness.    Cardiovascular: Negative for chest pain and leg swelling.    Gastrointestinal: Positive for constipation. Negative for abdominal distention and abdominal pain.   Genitourinary: Negative for difficulty urinating and dysuria.   Neurological: Negative for dizziness and weakness.   Psychiatric/Behavioral: Negative for agitation and confusion.     Objective:     Vital Signs (Most Recent):  Temp: 97.8 °F (36.6 °C) (05/08/19 0856)  Pulse: (!) 56 (05/08/19 1405)  Resp: 18 (05/08/19 1405)  BP: (!) 120/57 (05/08/19 0856)  SpO2: 97 % (05/08/19 1405) Vital Signs (24h Range):        Weight: 121.1 kg (266 lb 15.6 oz)  Body mass index is 40.59 kg/m².  No intake or output data in the 24 hours ending 05/24/19 1327   Physical Exam   Constitutional: She is oriented to person, place, and time. No distress.   Morbid obesity   HENT:   Head: Normocephalic and atraumatic.   Nose: Nose normal.   Mouth/Throat: No oropharyngeal exudate.   Eyes: Conjunctivae are normal. Right eye exhibits no discharge. Left eye exhibits no discharge. No scleral icterus.   Neck: Normal range of motion. Neck supple.   Cardiovascular: Normal rate, regular rhythm, normal heart sounds and intact distal pulses. Exam reveals no gallop and no friction rub.   No murmur heard.  Pulmonary/Chest: Effort normal and breath sounds normal. No stridor. No respiratory distress. She has no wheezes.   Abdominal: Soft. Bowel sounds are normal. She exhibits no distension. There is no tenderness. There is no rebound.   Obese abdomen. Left abdominal wall with erythema, tenderness, induration and warmth. No purulent drainage noted. No fluctuance.   Musculoskeletal: Normal range of motion. She exhibits no edema or deformity.   Neurological: She is alert and oriented to person, place, and time.   Skin: Skin is warm. Capillary refill takes less than 2 seconds. Rash noted. She is not diaphoretic. There is erythema.   Nursing note and vitals reviewed.      Significant Labs: All pertinent labs within the past 24 hours have been  reviewed.    Significant Imaging: I have reviewed and interpreted all pertinent imaging results/findings within the past 24 hours.    Assessment/Plan:      Chronic prescription opiate use  Norco  #90 filled on 4/27    Chronically on benzodiazepine therapy  - Patient filled Clonazepam 0.5 mg #60 on 4/27  - Was held secondary to encephalopathy  - Will resume slowly.    Encephalopathy, metabolic  - Resolved. Likely related to sepsis at presentation.  - CT head did not show any acute abnormality.    Other nonthrombocytopenic purpura  Noted. No issues.    Type 2 diabetes mellitus, without long-term current use of insulin  - A1c 5.6 in Jan 2019  - Low dose sliding scale      CHF (congestive heart failure)  - No evidence of exacerbation.  Restart home Lasix.    COPD (chronic obstructive pulmonary disease)  - No evidence of exacerbation  - Bronchodilators PRN  - Home meds    Hypertension associated with diabetes  - No acute issues  - Home meds    Chronic atrial fibrillation  - Continue beta blocker, continue Eliquis     Long term current use of anticoagulant therapy  - continue Eliquis      VTE Risk Mitigation (From admission, onward)        Ordered     IP VTE HIGH RISK PATIENT  Once      05/04/19 0154              Toma Goins MD  Department of Hospital Medicine   [facility]

## 2019-05-24 NOTE — SUBJECTIVE & OBJECTIVE
Interval History:  Patient seen and examined.  Reports pain is controlled.  Reports constipation.  Plan of care discussed with patient and daughter.    Review of Systems   Constitutional: Negative for chills and fever.   Respiratory: Negative for apnea and chest tightness.    Cardiovascular: Negative for chest pain and leg swelling.   Gastrointestinal: Positive for constipation. Negative for abdominal distention and abdominal pain.   Genitourinary: Negative for difficulty urinating and dysuria.   Neurological: Negative for dizziness and weakness.   Psychiatric/Behavioral: Negative for agitation and confusion.     Objective:     Vital Signs (Most Recent):  Temp: 97.8 °F (36.6 °C) (05/08/19 0856)  Pulse: (!) 56 (05/08/19 1405)  Resp: 18 (05/08/19 1405)  BP: (!) 120/57 (05/08/19 0856)  SpO2: 97 % (05/08/19 1405) Vital Signs (24h Range):        Weight: 121.1 kg (266 lb 15.6 oz)  Body mass index is 40.59 kg/m².  No intake or output data in the 24 hours ending 05/24/19 1327   Physical Exam   Constitutional: She is oriented to person, place, and time. No distress.   Morbid obesity   HENT:   Head: Normocephalic and atraumatic.   Nose: Nose normal.   Mouth/Throat: No oropharyngeal exudate.   Eyes: Conjunctivae are normal. Right eye exhibits no discharge. Left eye exhibits no discharge. No scleral icterus.   Neck: Normal range of motion. Neck supple.   Cardiovascular: Normal rate, regular rhythm, normal heart sounds and intact distal pulses. Exam reveals no gallop and no friction rub.   No murmur heard.  Pulmonary/Chest: Effort normal and breath sounds normal. No stridor. No respiratory distress. She has no wheezes.   Abdominal: Soft. Bowel sounds are normal. She exhibits no distension. There is no tenderness. There is no rebound.   Obese abdomen. Left abdominal wall with erythema, tenderness, induration and warmth. No purulent drainage noted. No fluctuance.   Musculoskeletal: Normal range of motion. She exhibits no edema or  deformity.   Neurological: She is alert and oriented to person, place, and time.   Skin: Skin is warm. Capillary refill takes less than 2 seconds. Rash noted. She is not diaphoretic. There is erythema.   Nursing note and vitals reviewed.      Significant Labs: All pertinent labs within the past 24 hours have been reviewed.    Significant Imaging: I have reviewed and interpreted all pertinent imaging results/findings within the past 24 hours.

## 2019-05-27 ENCOUNTER — OFFICE VISIT (OUTPATIENT)
Dept: HEMATOLOGY/ONCOLOGY | Facility: CLINIC | Age: 69
End: 2019-05-27
Payer: MEDICARE

## 2019-05-27 ENCOUNTER — TELEPHONE (OUTPATIENT)
Dept: ADMINISTRATIVE | Facility: CLINIC | Age: 69
End: 2019-05-27

## 2019-05-27 VITALS
RESPIRATION RATE: 24 BRPM | HEART RATE: 70 BPM | WEIGHT: 256.38 LBS | HEIGHT: 68 IN | BODY MASS INDEX: 38.86 KG/M2 | DIASTOLIC BLOOD PRESSURE: 49 MMHG | TEMPERATURE: 98 F | OXYGEN SATURATION: 94 % | SYSTOLIC BLOOD PRESSURE: 100 MMHG

## 2019-05-27 DIAGNOSIS — Z79.01 LONG TERM CURRENT USE OF ANTICOAGULANT THERAPY: ICD-10-CM

## 2019-05-27 DIAGNOSIS — Z86.2 HISTORY OF ANEMIA: Primary | ICD-10-CM

## 2019-05-27 DIAGNOSIS — R16.0 HEPATOMEGALY: ICD-10-CM

## 2019-05-27 PROCEDURE — 3074F PR MOST RECENT SYSTOLIC BLOOD PRESSURE < 130 MM HG: ICD-10-PCS | Mod: CPTII,S$GLB,, | Performed by: INTERNAL MEDICINE

## 2019-05-27 PROCEDURE — 99214 OFFICE O/P EST MOD 30 MIN: CPT | Mod: S$GLB,,, | Performed by: INTERNAL MEDICINE

## 2019-05-27 PROCEDURE — 99999 PR PBB SHADOW E&M-EST. PATIENT-LVL IV: CPT | Mod: PBBFAC,,, | Performed by: INTERNAL MEDICINE

## 2019-05-27 PROCEDURE — 3078F DIAST BP <80 MM HG: CPT | Mod: CPTII,S$GLB,, | Performed by: INTERNAL MEDICINE

## 2019-05-27 PROCEDURE — 3078F PR MOST RECENT DIASTOLIC BLOOD PRESSURE < 80 MM HG: ICD-10-PCS | Mod: CPTII,S$GLB,, | Performed by: INTERNAL MEDICINE

## 2019-05-27 PROCEDURE — 1101F PR PT FALLS ASSESS DOC 0-1 FALLS W/OUT INJ PAST YR: ICD-10-PCS | Mod: CPTII,S$GLB,, | Performed by: INTERNAL MEDICINE

## 2019-05-27 PROCEDURE — 1101F PT FALLS ASSESS-DOCD LE1/YR: CPT | Mod: CPTII,S$GLB,, | Performed by: INTERNAL MEDICINE

## 2019-05-27 PROCEDURE — 3074F SYST BP LT 130 MM HG: CPT | Mod: CPTII,S$GLB,, | Performed by: INTERNAL MEDICINE

## 2019-05-27 PROCEDURE — 99214 PR OFFICE/OUTPT VISIT, EST, LEVL IV, 30-39 MIN: ICD-10-PCS | Mod: S$GLB,,, | Performed by: INTERNAL MEDICINE

## 2019-05-27 PROCEDURE — 99999 PR PBB SHADOW E&M-EST. PATIENT-LVL IV: ICD-10-PCS | Mod: PBBFAC,,, | Performed by: INTERNAL MEDICINE

## 2019-05-27 NOTE — PROGRESS NOTES
CHIEF COMPLAINT: I aget facial pain and congestion with the change in weather     Ms. Tian is a 68 -year-old female who has a history of progressive anemia after  IV iron.    Here today for routine labs for anemia. Post IV iron in the past   She is tolerating Glucophage for diabetes as well as Lexapro for depression and Zestril  for hypertension  Trazodone not helping her sleep  Pt with sinus congestion   Scared to take OTC medicine on eliquis       Past Medical History:   Diagnosis Date    *Atrial flutter     Angina pectoris 9/18/2017    Anxiety     Arthritis     Asthma     Atrial fibrillation     Back pain     Cataract     OD    CHF (congestive heart failure)     COPD (chronic obstructive pulmonary disease)     Depression     Diabetes mellitus     Emphysema of lung     Heart failure     Hepatomegaly 2/3/2016    Hernia     History of MI (myocardial infarction) 1/19/2016    Hypercapnic respiratory failure, chronic 11/16/2016    Hyperlipidemia     Hypertension     Iron deficiency anemia 2/3/2016    Myocardial infarction     Obesity     Peripheral vascular disease     Pneumonia     Polyneuropathy     Retinal detachment     OS    Septic shock 4/23/2017    Skin ulcer 3/18/2017    Tobacco dependence     Type II 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, not stated as uncontrolled(250.60)          Current Outpatient Medications:     albuterol (ACCUNEB) 0.63 mg/3 mL Nebu, Take 3 mLs (0.63 mg total) by nebulization every 6 (six) hours as needed. Rescue, Disp: 75 mL, Rfl: 11    albuterol (PROVENTIL/VENTOLIN HFA) 90 mcg/actuation inhaler, INHALE 2 PUFFS INTO THE LUNGS EVERY 6 (SIX) HOURS AS NEEDED FOR RESCUE, Disp: 54 g, Rfl: 3    apixaban (ELIQUIS) 5 mg Tab, Take 1 tablet (5 mg total) by mouth 2 (two) times daily., Disp: 180 tablet, Rfl: 3    ascorbic acid, vitamin C, (VITAMIN C) 500 MG tablet, Take 1 tablet (500 mg total) by mouth every evening. (Patient  taking differently: Take 1,000 mg by mouth 2 (two) times daily. ), Disp: , Rfl:     atorvastatin (LIPITOR) 20 MG tablet, Take 1 tablet (20 mg total) by mouth once daily., Disp: 90 tablet, Rfl: 3    BREO ELLIPTA 100-25 mcg/dose diskus inhaler, INHALE 1 PUFF INTO THE LUNGS ONCE DAILY., Disp: 60 each, Rfl: 2    budesonide (PULMICORT) 0.5 mg/2 mL nebulizer solution, Take 2 mLs (0.5 mg total) by nebulization once daily. Controller, Disp: 180 mL, Rfl: 4    cefadroxil (DURICEF) 500 MG Cap, Take 1 capsule (500 mg total) by mouth every 12 (twelve) hours. For flare up x 1 week Take one table daily for prevention, Disp: 60 capsule, Rfl: 3    clonazePAM (KLONOPIN) 0.5 MG tablet, Take 1 tablet (0.5 mg total) by mouth 2 (two) times daily as needed for Anxiety. TAKE ONE TABLET BY MOUTH TWO TIMES A DAY AS NEEDED, Disp: 60 tablet, Rfl: 0    fluticasone (FLONASE) 50 mcg/actuation nasal spray, 1 spray (50 mcg total) by Each Nare route once daily., Disp: 1 Bottle, Rfl: 2    furosemide (LASIX) 40 MG tablet, TAKE 1 TABLET ONE TIME DAILY  FOR  FLUID  OVERLOAD, Disp: 90 tablet, Rfl: 3    gabapentin (NEURONTIN) 800 MG tablet, Take 1 tablet (800 mg total) by mouth 3 (three) times daily., Disp: 270 tablet, Rfl: 3    HYDROcodone-acetaminophen (NORCO)  mg per tablet, Take 1 tablet by mouth every 8 (eight) hours as needed for Pain (Do not take more than 3 a day. Do not mix with other narcotics.)., Disp: 90 tablet, Rfl: 0    levalbuterol (XOPENEX) 0.63 mg/3 mL nebulizer solution, INHALE THE CONTENTS OF 1 VIAL BY NEBULIZATION 4 times  DAILY.    DX: J43.9, Disp: 792 mL, Rfl: 3    lisinopril (PRINIVIL,ZESTRIL) 20 MG tablet, Take 1 tablet (20 mg total) by mouth once daily., Disp: 90 tablet, Rfl: 1    loratadine (CLARITIN) 10 mg tablet, Take 1 tablet (10 mg total) by mouth once daily., Disp: , Rfl: 0    metFORMIN (GLUCOPHAGE) 500 MG tablet, TAKE 1 TABLET (500 MG TOTAL) BY MOUTH 2 (TWO) TIMES DAILY WITH MEALS., Disp: 180 tablet, Rfl:  3    metoprolol succinate (TOPROL-XL) 25 MG 24 hr tablet, Take 0.5 tablets (12.5 mg total) by mouth once daily., Disp: 45 tablet, Rfl: 3    multivitamin (THERAGRAN) tablet, Take 1 tablet by mouth once daily., Disp: , Rfl:     [START ON 6/6/2019] nicotine (NICODERM CQ) 14 mg/24 hr, Place 1 patch onto the skin once daily. for 14 days, Disp: 14 patch, Rfl: 3    nicotine polacrilex 2 MG Lozg, Take 1 lozenge (2 mg total) by mouth as needed., Disp: , Rfl: 0    nystatin (NYSTOP) powder, Apply topically 2 (two) times daily., Disp: 120 g, Rfl: 1    nystatin-triamcinolone (MYCOLOG II) cream, Apply topically 3 (three) times daily., Disp: 30 g, Rfl: 1    omega-3 acid ethyl esters (LOVAZA) 1 gram capsule, 2 (two) times daily. , Disp: , Rfl:     omeprazole (PRILOSEC) 40 MG capsule, Take 1 capsule (40 mg total) by mouth once daily., Disp: 90 capsule, Rfl: 3    ondansetron (ZOFRAN-ODT) 8 MG TbDL, Take 1 tablet (8 mg total) by mouth every 6 (six) hours as needed., Disp: 20 tablet, Rfl: 3    polyethylene glycol (GLYCOLAX) 17 gram/dose powder, , Disp: , Rfl:     potassium chloride SA (KLOR-CON M20) 20 MEQ tablet, Take 1 tablet (20 mEq total) by mouth once daily., Disp: 30 tablet, Rfl: 6    senna-docusate 8.6-50 mg (PERICOLACE) 8.6-50 mg per tablet, Take 1 tablet by mouth 2 (two) times daily as needed for Constipation., Disp: , Rfl:     spironolactone (ALDACTONE) 25 MG tablet, Take 1 tablet (25 mg total) by mouth once daily., Disp: 30 tablet, Rfl: 11    traZODone (DESYREL) 100 MG tablet, Take 1.5 tablets (150 mg total) by mouth every evening., Disp: 90 tablet, Rfl: 1    Tolerating zofran for nausea and glucophage for DM     REVIEW OF SYSTEMS: extreme exhaustion  GENERAL:  No progression of fatigue nor SOB  She is obese.  Walking with walker,   HEENT:  No photophobia, + clear  rhinorrhea   RESPIRATORY:   positive congestion int  no colored discharge  No wheezing   CARDIOVASCULAR no   chest pain, NO   "palpitations  GASTROINTESTINAL:  No abdominal pain, dysphagia, emesis, diarrhea, or  constipation.    GENITOURINARY:  No urinary frequency, hesitancy  RHEUM:  no arthralgias or joint swelling.  MUSCOLSKELETAL:  No neck pain, back pain, positive  arthralgias  NEUROLOGICAL:  No headaches, positive dizziness, + paresthesias  PSYCH:  No agitation, change in behavior, or anxiety.  ENDOCRINE:  No hot or cold intolerance.      PHYSICAL EXAMINATION:  BP (!) 100/49   Pulse 70   Temp 97.6 °F (36.4 °C)   Resp (!) 24   Ht 5' 8" (1.727 m)   Wt 116.3 kg (256 lb 6.3 oz)   LMP  (LMP Unknown)   SpO2 (!) 94%   BMI 38.98 kg/m²     GENERAL:  She is obese.  She is oriented well groomed conversant   PSYCH:  Pleasant  affect.  No anxiety or depression.  HEENT:  Normocephalic.  Lids intact, conjunctivae pale     OP clear,  No palatal pallor.  NECK:  Supple.  Trachea midline.  No palpable abnormalities.  CHEST:+CLEAR  BS, no  fremitus  Decreased BS right base    CV S1S2 with RRR no loud S1 or S2   ABDOMEN:  NT, ND.  Normal bowel sounds.  No palpable HSM or mass.  EXTREMITIES:  Legs,  2 +  pitting edema.bilaterally   NEUROLOGICAL:  Patient is ambulating well with walker    SKIN:  Warm, dry.  Ecchymosis evident.  No tenting, petechiae    2mo ago  Iron30 - 160 ug/dL27 Abnormally low  33 Vjabeqpkcup987 - 375 mg/dL294 301 SOQE897 - 450 ug/dL435 445 Saturated Iron20 - 50 %6 Abnormally low  7 Abnormally low   Lasmr9bp ago  IgG 1382 - 929 mg/dL605 IgG 2242 - 700 mg/[id number]  IgG 322 - 176 mg/dL76 IgG 44 - 86 mg/dL72 Comment: Test performed at [facility]   TECHNIQUE:  A single portable semi-upright AP chest radiograph was acquired.    COMPARISON:  Chest x-ray-01/19/2019    FINDINGS:  The cardiomediastinal silhouette is normal in appearance.  There is atherosclerotic calcification present within the aortic arch.  No pulmonary vascular congestion appreciated. There is bandlike atelectasis versus pneumonitis at the left lung base.  No significant " volume of pleural fluid or pneumothorax. The bones reveal a remote/healed right clavicular midshaft fracture.  There is degenerative change of the thoracic spine.      Impression       Bandlike atelectasis versus pneumonitis at the left lung base.  Please correlate clinically.      Electronically signed by: Jesus Corral MD  Date: 05/04/2019     Narrative     EXAMINATION:  CT HEAD WITHOUT CONTRAST    CLINICAL HISTORY:  Confusion/delirium, altered LOC, unexplained;    TECHNIQUE:  5 mm noncontrast axial images were acquired through the head.    COMPARISON:  CT of the head-06/10/2010    FINDINGS:  The brain is normally formed with preserved gray-white matter junction differentiation. No evidence of acute/recent major vascular territory cerebral infarction, parenchymal hemorrhage, or intra-axial mass.    No hydrocephalus.  No effacement of the skull-base cisterns.  No extra-axial fluid collections or blood products.    The paranasal sinuses and mastoid air cells are clear.  There are postoperative changes of the left globe..  The bony calvarium and visualized facial bones show no acute abnormality.      Impression       No acute intracranial abnormality appreciated.  No interval detrimental change in the imaging appearance of the brain when compared to the previous study.    The preliminary and final reports are concordant.      Electronically signed by: Jesus Corral MD  Date: 05/04/2019  Time: 07:19        CLINICAL HISTORY:  Abdominal panniculitis, please evaluate for abscess.;    TECHNIQUE:  A focused ultrasound examination was performed in the left lower quadrant abdominal soft tissues in the area of clinical concern.    COMPARISON:  Soft tissue ultrasound 04/23/2017    FINDINGS:  There is prominent subcutaneous edema and skin thickening observed in the area of clinical concern over the left lower quadrant abdominal wall compatible with the provided history of panniculitis/cellulitis.  No abscess appreciated.       Impression       No abdominal wall abscess appreciated in the area of the patient's left lower quadrant abdominal wall panniculitis/cellulitis.      Electronically signed by: Jesus Corral MD  Date: 05/05/2019  Time: 18:02          Last Resulted: 05/05/       Lab Results   Component Value Date    WBC 6.90 05/23/2019    HGB 13.8 05/23/2019    HCT 42.5 05/23/2019    MCV 91 05/23/2019     05/23/2019     CMP  Sodium   Date Value Ref Range Status   05/23/2019 142 136 - 145 mmol/L Final     Potassium   Date Value Ref Range Status   05/23/2019 4.1 3.5 - 5.1 mmol/L Final     Chloride   Date Value Ref Range Status   05/23/2019 101 95 - 110 mmol/L Final     CO2   Date Value Ref Range Status   05/23/2019 28 23 - 29 mmol/L Final     Glucose   Date Value Ref Range Status   05/23/2019 86 70 - 110 mg/dL Final     BUN, Bld   Date Value Ref Range Status   05/23/2019 16 8 - 23 mg/dL Final     Creatinine   Date Value Ref Range Status   05/23/2019 0.8 0.5 - 1.4 mg/dL Final     Calcium   Date Value Ref Range Status   05/23/2019 10.4 8.7 - 10.5 mg/dL Final     Total Protein   Date Value Ref Range Status   05/23/2019 7.8 6.0 - 8.4 g/dL Final     Albumin   Date Value Ref Range Status   05/23/2019 3.7 3.5 - 5.2 g/dL Final     Total Bilirubin   Date Value Ref Range Status   05/23/2019 0.5 0.1 - 1.0 mg/dL Final     Comment:     For infants and newborns, interpretation of results should be based  on gestational age, weight and in agreement with clinical  observations.  Premature Infant recommended reference ranges:  Up to 24 hours.............<8.0 mg/dL  Up to 48 hours............<12.0 mg/dL  3-5 days..................<15.0 mg/dL  6-29 days.................<15.0 mg/dL       Alkaline Phosphatase   Date Value Ref Range Status   05/23/2019 72 55 - 135 U/L Final     AST   Date Value Ref Range Status   05/23/2019 19 10 - 40 U/L Final     ALT   Date Value Ref Range Status   05/23/2019 16 10 - 44 U/L Final     Anion Gap   Date Value Ref Range  Status   05/23/2019 13 8 - 16 mmol/L Final     eGFR if    Date Value Ref Range Status   05/23/2019 >60 >60 mL/min/1.73 m^2 Final     eGFR if non    Date Value Ref Range Status   05/23/2019 >60 >60 mL/min/1.73 m^2 Final     Comment:     Calculation used to obtain the estimated glomerular filtration  rate (eGFR) is the CKD-EPI equation.             Ref Range & Units 4d ago   Iron 30 - 160 ug/dL 52    Transferrin 200 - 375 mg/dL 257    TIBC 250 - 450 ug/dL 380    Saturated Iron 20 - 50 % 14Low        History of anemia  -     CBC auto differential; Future; Expected date: 05/27/2019  -     Iron and TIBC; Future; Expected date: 05/27/2019  -     FREE LT CHAIN ANAL; Future; Expected date: 05/27/2019    Hepatomegaly    Long term current use of anticoagulant therapy    paroxysmal A fib : she is stable      No further need for IV iron at this time  No renal disease  Hemoglobin remains stable  SHe does have low IGG2 hence explained if she ever develops an infection that does not respond to the antibiotics she may need IVIG   Again BMI too high : she has lost weight and is working on further reduction  Focus on decreasing weight to decrease her risk of a thrombotic event     RTC 2 months   May cont mucinex D bid prn sinus allergies and congestion    Cont lipitor to prevent plaques monitored by pcp  Cont lasix to help with edema for BP monitored by pcp  She is to continue eliquis for chronic atrial fibrillation as well as her diabetic medication to help control hyperglycemia due to diabetes

## 2019-05-27 NOTE — PROGRESS NOTES
HH SOC with [facility], Dr. Constantine Bird. Patient received SN services.

## 2019-06-05 ENCOUNTER — DOCUMENTATION ONLY (OUTPATIENT)
Dept: FAMILY MEDICINE | Facility: CLINIC | Age: 69
End: 2019-06-05

## 2019-06-05 DIAGNOSIS — E11.40 TYPE 2 DIABETES MELLITUS WITH DIABETIC NEUROPATHY, WITHOUT LONG-TERM CURRENT USE OF INSULIN: Primary | ICD-10-CM

## 2019-06-05 DIAGNOSIS — E11.40 TYPE 2 DIABETES MELLITUS WITH DIABETIC NEUROPATHY, WITHOUT LONG-TERM CURRENT USE OF INSULIN: ICD-10-CM

## 2019-06-05 RX ORDER — INSULIN PUMP SYRINGE, 3 ML
EACH MISCELLANEOUS
Qty: 1 EACH | Refills: 0 | Status: SHIPPED | OUTPATIENT
Start: 2019-06-05 | End: 2021-01-15 | Stop reason: CLARIF

## 2019-06-05 RX ORDER — INSULIN PUMP SYRINGE, 3 ML
EACH MISCELLANEOUS
Qty: 1 EACH | Refills: 0 | Status: SHIPPED | OUTPATIENT
Start: 2019-06-05 | End: 2019-06-05 | Stop reason: SDUPTHER

## 2019-06-05 RX ORDER — LANCETS
EACH MISCELLANEOUS
Qty: 300 EACH | Refills: 11 | Status: SHIPPED | OUTPATIENT
Start: 2019-06-05 | End: 2021-01-15 | Stop reason: CLARIF

## 2019-06-05 NOTE — TELEPHONE ENCOUNTER
----- Message from Saskia Sun sent at 6/5/2019  2:07 PM CDT -----  Contact: self  Type:  Patient Returning Call    Who Called:  self  Who Left Message for Patient:  Not sure  Does the patient know what this is regarding?:  yes  Best Call Back Number:  [phone]  Additional Information:  Patient states Camryn will be calling for approval for glucose machine she needs. Please call patient. Thanks!

## 2019-06-05 NOTE — TELEPHONE ENCOUNTER
Patient requesting new glucose meter. Requesting prescription for meter be sent to [facility]. Please advise.   LOV--5-16-19 (CRUZITO Pablo)  FOV--8-16-19 (Dr. Bird)

## 2019-06-05 NOTE — PROGRESS NOTES
Pre-Visit Chart Review  For Appointment Scheduled on 06/07/2019    Health Maintenance Due   Topic Date Due    Foot Exam  06/02/2019

## 2019-06-10 ENCOUNTER — OFFICE VISIT (OUTPATIENT)
Dept: FAMILY MEDICINE | Facility: CLINIC | Age: 69
End: 2019-06-10
Payer: MEDICARE

## 2019-06-10 VITALS
HEART RATE: 66 BPM | TEMPERATURE: 98 F | WEIGHT: 256.38 LBS | SYSTOLIC BLOOD PRESSURE: 99 MMHG | DIASTOLIC BLOOD PRESSURE: 51 MMHG | HEIGHT: 68 IN | BODY MASS INDEX: 38.86 KG/M2

## 2019-06-10 DIAGNOSIS — S31.109A WOUND OF ABDOMEN: Primary | ICD-10-CM

## 2019-06-10 DIAGNOSIS — E11.40 TYPE 2 DIABETES MELLITUS WITH DIABETIC NEUROPATHY, WITHOUT LONG-TERM CURRENT USE OF INSULIN: Chronic | ICD-10-CM

## 2019-06-10 DIAGNOSIS — E66.01 SEVERE OBESITY (BMI 35.0-35.9 WITH COMORBIDITY): ICD-10-CM

## 2019-06-10 DIAGNOSIS — I15.2 HYPERTENSION ASSOCIATED WITH DIABETES: ICD-10-CM

## 2019-06-10 DIAGNOSIS — E11.59 HYPERTENSION ASSOCIATED WITH DIABETES: ICD-10-CM

## 2019-06-10 PROCEDURE — 3044F HG A1C LEVEL LT 7.0%: CPT | Mod: CPTII,S$GLB,, | Performed by: NURSE PRACTITIONER

## 2019-06-10 PROCEDURE — 99999 PR PBB SHADOW E&M-EST. PATIENT-LVL V: ICD-10-PCS | Mod: PBBFAC,,, | Performed by: NURSE PRACTITIONER

## 2019-06-10 PROCEDURE — 1101F PT FALLS ASSESS-DOCD LE1/YR: CPT | Mod: CPTII,S$GLB,, | Performed by: NURSE PRACTITIONER

## 2019-06-10 PROCEDURE — 1101F PR PT FALLS ASSESS DOC 0-1 FALLS W/OUT INJ PAST YR: ICD-10-PCS | Mod: CPTII,S$GLB,, | Performed by: NURSE PRACTITIONER

## 2019-06-10 PROCEDURE — 3078F PR MOST RECENT DIASTOLIC BLOOD PRESSURE < 80 MM HG: ICD-10-PCS | Mod: CPTII,S$GLB,, | Performed by: NURSE PRACTITIONER

## 2019-06-10 PROCEDURE — 99213 PR OFFICE/OUTPT VISIT, EST, LEVL III, 20-29 MIN: ICD-10-PCS | Mod: S$GLB,,, | Performed by: NURSE PRACTITIONER

## 2019-06-10 PROCEDURE — 3074F PR MOST RECENT SYSTOLIC BLOOD PRESSURE < 130 MM HG: ICD-10-PCS | Mod: CPTII,S$GLB,, | Performed by: NURSE PRACTITIONER

## 2019-06-10 PROCEDURE — 3078F DIAST BP <80 MM HG: CPT | Mod: CPTII,S$GLB,, | Performed by: NURSE PRACTITIONER

## 2019-06-10 PROCEDURE — 99213 OFFICE O/P EST LOW 20 MIN: CPT | Mod: S$GLB,,, | Performed by: NURSE PRACTITIONER

## 2019-06-10 PROCEDURE — 99999 PR PBB SHADOW E&M-EST. PATIENT-LVL V: CPT | Mod: PBBFAC,,, | Performed by: NURSE PRACTITIONER

## 2019-06-10 PROCEDURE — 3044F PR MOST RECENT HEMOGLOBIN A1C LEVEL <7.0%: ICD-10-PCS | Mod: CPTII,S$GLB,, | Performed by: NURSE PRACTITIONER

## 2019-06-10 PROCEDURE — 3074F SYST BP LT 130 MM HG: CPT | Mod: CPTII,S$GLB,, | Performed by: NURSE PRACTITIONER

## 2019-06-10 NOTE — PROGRESS NOTES
Subjective:       Patient ID: Nelly Tian is a 68 y.o. female.    Chief Complaint: ulcers on sides    Ms. Tian presents to the clinic today for recurrent wounds to the left side of her abdomen.  She continues to take Duracef prescribed by ID daily for prevention of cellulitis. She denies fevers.  Her daughter assists her to dress the wounds.  She was discharged from [facility] when the wounds healed.  They returned last week.      Review of Systems   Constitutional: Negative for chills and fever.   Skin: Positive for wound. Negative for rash.       Objective:      Physical Exam   Constitutional: She is oriented to person, place, and time. She appears well-nourished. No distress.   HENT:   Head: Normocephalic and atraumatic.   Right Ear: External ear normal.   Left Ear: External ear normal.   Cardiovascular: Normal rate. Exam reveals no friction rub.   Pulmonary/Chest: Effort normal.   Neurological: She is alert and oriented to person, place, and time. Coordination normal.   Skin: Skin is warm and dry.   Ulcerations to left lower abdomen x 4.  The most lateral wound has tan wound bed with slough while the other wounds have pink wound beds.  There is mild redness/irritation surrounding wounds but no warmth.   Psychiatric: She has a normal mood and affect. Her behavior is normal. Thought content normal.   Vitals reviewed.          Current Outpatient Medications:     albuterol (ACCUNEB) 0.63 mg/3 mL Nebu, Take 3 mLs (0.63 mg total) by nebulization every 6 (six) hours as needed. Rescue, Disp: 75 mL, Rfl: 11    albuterol (PROVENTIL/VENTOLIN HFA) 90 mcg/actuation inhaler, INHALE 2 PUFFS INTO THE LUNGS EVERY 6 (SIX) HOURS AS NEEDED FOR RESCUE, Disp: 54 g, Rfl: 3    apixaban (ELIQUIS) 5 mg Tab, Take 1 tablet (5 mg total) by mouth 2 (two) times daily., Disp: 180 tablet, Rfl: 3    ascorbic acid, vitamin C, (VITAMIN C) 500 MG tablet, Take 1 tablet (500 mg total) by mouth every evening. (Patient taking  differently: Take 1,000 mg by mouth 2 (two) times daily. ), Disp: , Rfl:     atorvastatin (LIPITOR) 20 MG tablet, Take 1 tablet (20 mg total) by mouth once daily., Disp: 90 tablet, Rfl: 3    blood sugar diagnostic Strp, To check BG 3 times daily, to use with insurance preferred meter., Disp: 300 strip, Rfl: 11    blood-glucose meter (TRUE METRIX AIR GLUCOSE METER) kit, AC meals. Insulin dependent.Dispence strips and lancets 3 months., Disp: 1 each, Rfl: 0    BREO ELLIPTA 100-25 mcg/dose diskus inhaler, INHALE 1 PUFF INTO THE LUNGS ONCE DAILY., Disp: 60 each, Rfl: 2    budesonide (PULMICORT) 0.5 mg/2 mL nebulizer solution, Take 2 mLs (0.5 mg total) by nebulization once daily. Controller, Disp: 180 mL, Rfl: 4    clonazePAM (KLONOPIN) 0.5 MG tablet, Take 1 tablet (0.5 mg total) by mouth 2 (two) times daily as needed for Anxiety. TAKE ONE TABLET BY MOUTH TWO TIMES A DAY AS NEEDED, Disp: 60 tablet, Rfl: 0    fluticasone (FLONASE) 50 mcg/actuation nasal spray, 1 spray (50 mcg total) by Each Nare route once daily., Disp: 1 Bottle, Rfl: 2    furosemide (LASIX) 40 MG tablet, TAKE 1 TABLET ONE TIME DAILY  FOR  FLUID  OVERLOAD, Disp: 90 tablet, Rfl: 3    gabapentin (NEURONTIN) 800 MG tablet, Take 1 tablet (800 mg total) by mouth 3 (three) times daily., Disp: 270 tablet, Rfl: 3    HYDROcodone-acetaminophen (NORCO)  mg per tablet, Take 1 tablet by mouth every 8 (eight) hours as needed for Pain (Do not take more than 3 a day. Do not mix with other narcotics.)., Disp: 90 tablet, Rfl: 0    lancets Misc, To check BG 3 times daily, to use with insurance preferred meter., Disp: 300 each, Rfl: 11    levalbuterol (XOPENEX) 0.63 mg/3 mL nebulizer solution, INHALE THE CONTENTS OF 1 VIAL BY NEBULIZATION 4 times  DAILY.    DX: J43.9, Disp: 792 mL, Rfl: 3    lisinopril (PRINIVIL,ZESTRIL) 20 MG tablet, Take 1 tablet (20 mg total) by mouth once daily., Disp: 90 tablet, Rfl: 1    loratadine (CLARITIN) 10 mg tablet, Take 1  tablet (10 mg total) by mouth once daily., Disp: , Rfl: 0    metFORMIN (GLUCOPHAGE) 500 MG tablet, TAKE 1 TABLET (500 MG TOTAL) BY MOUTH 2 (TWO) TIMES DAILY WITH MEALS., Disp: 180 tablet, Rfl: 3    metoprolol succinate (TOPROL-XL) 25 MG 24 hr tablet, Take 0.5 tablets (12.5 mg total) by mouth once daily., Disp: 45 tablet, Rfl: 3    multivitamin (THERAGRAN) tablet, Take 1 tablet by mouth once daily., Disp: , Rfl:     nystatin (NYSTOP) powder, Apply topically 2 (two) times daily., Disp: 120 g, Rfl: 1    nystatin-triamcinolone (MYCOLOG II) cream, Apply topically 3 (three) times daily., Disp: 30 g, Rfl: 1    omega-3 acid ethyl esters (LOVAZA) 1 gram capsule, 2 (two) times daily. , Disp: , Rfl:     omeprazole (PRILOSEC) 40 MG capsule, Take 1 capsule (40 mg total) by mouth once daily., Disp: 90 capsule, Rfl: 3    ondansetron (ZOFRAN-ODT) 8 MG TbDL, Take 1 tablet (8 mg total) by mouth every 6 (six) hours as needed., Disp: 20 tablet, Rfl: 3    polyethylene glycol (GLYCOLAX) 17 gram/dose powder, , Disp: , Rfl:     potassium chloride SA (KLOR-CON M20) 20 MEQ tablet, Take 1 tablet (20 mEq total) by mouth once daily., Disp: 30 tablet, Rfl: 6    senna-docusate 8.6-50 mg (PERICOLACE) 8.6-50 mg per tablet, Take 1 tablet by mouth 2 (two) times daily as needed for Constipation., Disp: , Rfl:     spironolactone (ALDACTONE) 25 MG tablet, Take 1 tablet (25 mg total) by mouth once daily., Disp: 30 tablet, Rfl: 11    traZODone (DESYREL) 100 MG tablet, Take 1.5 tablets (150 mg total) by mouth every evening., Disp: 90 tablet, Rfl: 1  Assessment:       1. Wound of abdomen    2. Hypertension associated with diabetes    3. Type 2 diabetes mellitus with diabetic neuropathy, without long-term current use of insulin    4. Severe obesity (BMI 35.0-35.9 with comorbidity)        Plan:       Wound of abdomen  Wounds were cleansed with sterile saline and vaseline gauze was applied to wounds with non adhesive dressing on top.  -      Ambulatory referral to Wound Clinic  -     Ambulatory referral to Home Health    Hypertension associated with diabetes  Stable, continue current medication.    Type 2 diabetes mellitus with diabetic neuropathy, without long-term current use of insulin  Controlled, continue current medication.    Severe obesity (BMI 35.0-35.9 with comorbidity)      Patient readiness: acceptance and barriers:social stressors    During the course of the visit the patient was educated and counseled about the following:     Diabetes:  Discussed general issues about diabetes pathophysiology and management.  Hypertension:   Medication: no change.  Obesity:   General weight loss/lifestyle modification strategies discussed (elicit support from others; identify saboteurs; non-food rewards, etc).  Diet interventions: moderate (500 kCal/d) deficit diet and qualitative changes (increase low-fat,  high-fiber foods).  Regular aerobic exercise program discussed.    Goals: Diabetes: Maintain Hemoglobin A1C below 7, Hypertension: Reduce Blood Pressure and Obesity: Reduce calorie intake and BMI    Did patient meet goals/outcomes: Yes    The following self management tools provided: declined    Patient Instructions (the written plan) was given to the patient/family.     Time spent with patient: 15 minutes    Barriers to medications present (no )    Adverse reactions to current medications (no)    Over the counter medications reviewed (Yes)

## 2019-06-11 ENCOUNTER — TELEPHONE (OUTPATIENT)
Dept: FAMILY MEDICINE | Facility: CLINIC | Age: 69
End: 2019-06-11

## 2019-06-11 NOTE — TELEPHONE ENCOUNTER
REFERRAL   Received: Yesterday   Message Contents   Thelma Finch sent to JASMIN Barbosa; RAISA Morelos Staff             NYLA GIBBS MRN # [id number]  1950     Thank you for the referral for HH service.   However, we are unable to accept due to:       pt does not have an [facility] PCP listed in [facility] site when verifying insurance.   If you need anything further, please contact our office.   [facility] Hoel[facility]    Intake Dept   [phone]     Spoke to patient,notified she will need to call her insurance to update PCP patient will contact office once this is completed.

## 2019-06-11 NOTE — TELEPHONE ENCOUNTER
Spoke to home health  notified patient updated pcp.   States need orders for wound care faxed to [phone] orders faxed with office note and demographics.  Spoke to patient notified orders have been faxed,provided patient with phone number to home health intake to be admitted asap.

## 2019-06-14 DIAGNOSIS — E11.9 TYPE 2 DIABETES MELLITUS WITHOUT COMPLICATION: ICD-10-CM

## 2019-06-21 ENCOUNTER — EXTERNAL HOME HEALTH (OUTPATIENT)
Dept: HOME HEALTH SERVICES | Facility: HOSPITAL | Age: 69
End: 2019-06-21

## 2019-06-21 ENCOUNTER — TELEPHONE (OUTPATIENT)
Dept: FAMILY MEDICINE | Facility: CLINIC | Age: 69
End: 2019-06-21

## 2019-06-21 NOTE — TELEPHONE ENCOUNTER
----- Message from Pari Sebastian sent at 6/21/2019 11:45 AM CDT -----  Contact: Patient  Type: Needs Medical Advice    Who Called:  Patient  Best Call Back Number:   Additional Information: Calling with questions for the Nurse about her wanting some tests done. Please advise

## 2019-06-21 NOTE — TELEPHONE ENCOUNTER
Patient states Dr. Bird placed orders for a Lipid Panel. States she is schedule to perform labs on 7/24/19 for Dr. Ramos. Requesting to link order for Lipid Panel to existing lab appointment on 7/24/19. Orders linked as requested.

## 2019-06-24 ENCOUNTER — TELEPHONE (OUTPATIENT)
Dept: FAMILY MEDICINE | Facility: CLINIC | Age: 69
End: 2019-06-24

## 2019-06-24 NOTE — TELEPHONE ENCOUNTER
Order for Lipid Panel linked to existing lab appointment for 7/24/19. Orders were linked on 6/21/19. Patient notified. Verbalized understanding.

## 2019-06-24 NOTE — TELEPHONE ENCOUNTER
----- Message from Morenita Tolbert sent at 6/24/2019 10:11 AM CDT -----  Contact: Nelly  Type: Needs Medical Advice    Who Called:  patient  Best Call Back Number: [phone]  Additional Information: requesting a call back regarding lab orders per Dr. Bird to [facility]--no orders are in Epic--please advise--thank you

## 2019-06-25 DIAGNOSIS — M43.12 SPONDYLOLISTHESIS OF CERVICAL REGION: ICD-10-CM

## 2019-06-25 DIAGNOSIS — M51.36 LUMBAR DEGENERATIVE DISC DISEASE: ICD-10-CM

## 2019-06-25 RX ORDER — CLONAZEPAM 0.5 MG/1
0.5 TABLET ORAL 2 TIMES DAILY PRN
Qty: 60 TABLET | Refills: 0 | Status: SHIPPED | OUTPATIENT
Start: 2019-06-25 | End: 2019-07-24 | Stop reason: SDUPTHER

## 2019-06-25 RX ORDER — HYDROCODONE BITARTRATE AND ACETAMINOPHEN 10; 325 MG/1; MG/1
1 TABLET ORAL EVERY 8 HOURS PRN
Qty: 90 TABLET | Refills: 0 | Status: SHIPPED | OUTPATIENT
Start: 2019-06-25 | End: 2019-07-24 | Stop reason: SDUPTHER

## 2019-06-25 NOTE — TELEPHONE ENCOUNTER
Patient requesting a refill of the following medications:  Hydrocodone--LR--5/26/19  Clonazepam--LR--5/26/19  LOV--6/10/19 (CRUZITO Pablo)  FOV--8/16/19 (Dr. Bird)  Urine Toxicology--12/6/18  Pain Contract--12/6/18  Checked [place] Prescription Monitoring Program site. No unusual or abnormal behavior noted. Confirmed per  Hydocodone last refilled on 5/28/19 & 5/26/19.

## 2019-06-27 ENCOUNTER — PATIENT MESSAGE (OUTPATIENT)
Dept: SURGERY | Facility: CLINIC | Age: 69
End: 2019-06-27

## 2019-06-27 ENCOUNTER — HOSPITAL ENCOUNTER (EMERGENCY)
Facility: HOSPITAL | Age: 69
Discharge: HOME | End: 2019-06-27
Attending: EMERGENCY MEDICINE
Payer: MEDICARE

## 2019-06-27 VITALS
WEIGHT: 255 LBS | DIASTOLIC BLOOD PRESSURE: 58 MMHG | RESPIRATION RATE: 20 BRPM | HEART RATE: 90 BPM | TEMPERATURE: 97 F | SYSTOLIC BLOOD PRESSURE: 119 MMHG | HEIGHT: 68 IN | BODY MASS INDEX: 38.65 KG/M2 | OXYGEN SATURATION: 98 %

## 2019-06-27 DIAGNOSIS — K43.9 ABDOMINAL WALL HERNIA: ICD-10-CM

## 2019-06-27 DIAGNOSIS — R58 VENOUS BLEED: Primary | ICD-10-CM

## 2019-06-27 LAB
ABO + RH BLD: NORMAL
ALBUMIN SERPL BCP-MCNC: 3.6 G/DL (ref 3.5–5.2)
ALP SERPL-CCNC: 74 U/L (ref 55–135)
ALT SERPL W/O P-5'-P-CCNC: 14 U/L (ref 10–44)
ANION GAP SERPL CALC-SCNC: 10 MMOL/L (ref 8–16)
AST SERPL-CCNC: 15 U/L (ref 10–40)
BASOPHILS # BLD AUTO: 0.05 K/UL (ref 0–0.2)
BASOPHILS NFR BLD: 0.7 % (ref 0–1.9)
BILIRUB SERPL-MCNC: 0.3 MG/DL (ref 0.1–1)
BLD GP AB SCN CELLS X3 SERPL QL: NORMAL
BUN SERPL-MCNC: 13 MG/DL (ref 8–23)
CALCIUM SERPL-MCNC: 9.9 MG/DL (ref 8.7–10.5)
CHLORIDE SERPL-SCNC: 103 MMOL/L (ref 95–110)
CO2 SERPL-SCNC: 28 MMOL/L (ref 23–29)
CREAT SERPL-MCNC: 0.7 MG/DL (ref 0.5–1.4)
DIFFERENTIAL METHOD: ABNORMAL
EOSINOPHIL # BLD AUTO: 0.1 K/UL (ref 0–0.5)
EOSINOPHIL NFR BLD: 0.8 % (ref 0–8)
ERYTHROCYTE [DISTWIDTH] IN BLOOD BY AUTOMATED COUNT: 13.9 % (ref 11.5–14.5)
EST. GFR  (AFRICAN AMERICAN): >60 ML/MIN/1.73 M^2
EST. GFR  (NON AFRICAN AMERICAN): >60 ML/MIN/1.73 M^2
GLUCOSE SERPL-MCNC: 87 MG/DL (ref 70–110)
HCT VFR BLD AUTO: 40.8 % (ref 37–48.5)
HGB BLD-MCNC: 12.5 G/DL (ref 12–16)
IMM GRANULOCYTES # BLD AUTO: 0.03 K/UL (ref 0–0.04)
INR PPP: 1 (ref 0.8–1.2)
LIPASE SERPL-CCNC: 30 U/L (ref 4–60)
LYMPHOCYTES # BLD AUTO: 1.5 K/UL (ref 1–4.8)
LYMPHOCYTES NFR BLD: 20 % (ref 18–48)
MCH RBC QN AUTO: 29.4 PG (ref 27–31)
MCHC RBC AUTO-ENTMCNC: 30.6 G/DL (ref 32–36)
MCV RBC AUTO: 96 FL (ref 82–98)
MONOCYTES # BLD AUTO: 0.8 K/UL (ref 0.3–1)
MONOCYTES NFR BLD: 10.1 % (ref 4–15)
NEUTROPHILS # BLD AUTO: 5.2 K/UL (ref 1.8–7.7)
NEUTROPHILS NFR BLD: 68 % (ref 38–73)
NRBC BLD-RTO: 0 /100 WBC
PLATELET # BLD AUTO: 217 K/UL (ref 150–350)
PMV BLD AUTO: 10.5 FL (ref 9.2–12.9)
POTASSIUM SERPL-SCNC: 4.4 MMOL/L (ref 3.5–5.1)
PROT SERPL-MCNC: 7.6 G/DL (ref 6–8.4)
PROTHROMBIN TIME: 10.5 SEC (ref 9–12.5)
RBC # BLD AUTO: 4.25 M/UL (ref 4–5.4)
SODIUM SERPL-SCNC: 141 MMOL/L (ref 136–145)
WBC # BLD AUTO: 7.66 K/UL (ref 3.9–12.7)

## 2019-06-27 PROCEDURE — 25000003 PHARM REV CODE 250

## 2019-06-27 PROCEDURE — 25000003 PHARM REV CODE 250: Performed by: EMERGENCY MEDICINE

## 2019-06-27 PROCEDURE — 96372 THER/PROPH/DIAG INJ SC/IM: CPT

## 2019-06-27 PROCEDURE — 86850 RBC ANTIBODY SCREEN: CPT

## 2019-06-27 PROCEDURE — 80053 COMPREHEN METABOLIC PANEL: CPT

## 2019-06-27 PROCEDURE — 83690 ASSAY OF LIPASE: CPT

## 2019-06-27 PROCEDURE — 85610 PROTHROMBIN TIME: CPT

## 2019-06-27 PROCEDURE — 85025 COMPLETE CBC W/AUTO DIFF WBC: CPT

## 2019-06-27 PROCEDURE — 99284 EMERGENCY DEPT VISIT MOD MDM: CPT | Mod: 25

## 2019-06-27 RX ORDER — HYDROCODONE BITARTRATE AND ACETAMINOPHEN 10; 325 MG/1; MG/1
1 TABLET ORAL
Status: COMPLETED | OUTPATIENT
Start: 2019-06-27 | End: 2019-06-27

## 2019-06-27 RX ORDER — CLONAZEPAM 0.5 MG/1
0.5 TABLET ORAL
Status: COMPLETED | OUTPATIENT
Start: 2019-06-27 | End: 2019-06-27

## 2019-06-27 RX ORDER — LIDOCAINE HYDROCHLORIDE AND EPINEPHRINE 10; 10 MG/ML; UG/ML
INJECTION, SOLUTION INFILTRATION; PERINEURAL
Status: COMPLETED
Start: 2019-06-27 | End: 2019-06-27

## 2019-06-27 RX ADMIN — LIDOCAINE HYDROCHLORIDE,EPINEPHRINE BITARTRATE 20 ML: 10; .01 INJECTION, SOLUTION INFILTRATION; PERINEURAL at 02:06

## 2019-06-27 RX ADMIN — HYDROCODONE BITARTRATE AND ACETAMINOPHEN 1 TABLET: 10; 325 TABLET ORAL at 01:06

## 2019-06-27 RX ADMIN — CLONAZEPAM 0.5 MG: 0.5 TABLET ORAL at 02:06

## 2019-06-27 NOTE — TELEPHONE ENCOUNTER
Called pt, daughter answered. Scheduled appt in office to discuss surgery. Confirmed location. Pt's daughter acknowledged.

## 2019-06-27 NOTE — ED PROVIDER NOTES
"Encounter Date: 2019    SCRIBE #1 NOTE: I, Yenni Biggs, am scribing for, and in the presence of, Conner Morton MD.       History     Chief Complaint   Patient presents with    Wound Check     home health was changing bandage to abdominal wound and was unable to stop the bleeding. on eliquis       Time seen by provider: 12:10 PM on 2019    Nelly Tian is a 68 y.o. female who presents to the ED via EMS for evaluation of a wound check. The patient states "a hernia popped and my skin is thin" recently. Today, the home health nurse was attempting to change the bandage but was unable to stop the bleeding. The patient is presently on Eliquis. She has no other medical concerns or complaints at this moment. The patient denies onset of any other new symptoms currently. PMHx includes A-fib, DM, COPD, HTN, MI, PVD, and CHF. SHx includes , oophorectomy, and hysterectomy. Known drug allergies includes Dilaudid and Zyvox.     The history is provided by the patient.     Review of patient's allergies indicates:   Allergen Reactions    Dilaudid [hydromorphone] Anaphylaxis     Other reaction(s): Anaphylaxis  Other reaction(s): Unknown    Zyvox [linezolid in dextrose 5%] Shortness Of Breath     Past Medical History:   Diagnosis Date    *Atrial flutter     Angina pectoris 2017    Anxiety     Arthritis     Asthma     Atrial fibrillation     Back pain     Cataract     OD    CHF (congestive heart failure)     COPD (chronic obstructive pulmonary disease)     Depression     Diabetes mellitus     Emphysema of lung     Heart failure     Hepatomegaly 2/3/2016    Hernia     History of MI (myocardial infarction) 2016    Hypercapnic respiratory failure, chronic 2016    Hyperlipidemia     Hypertension     Iron deficiency anemia 2/3/2016    Myocardial infarction     Obesity     Peripheral vascular disease     Pneumonia     Polyneuropathy     Retinal detachment     OS    " Septic shock 2017    Skin ulcer 3/18/2017    Tobacco dependence     Type II 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, not stated as uncontrolled(250.60)      Past Surgical History:   Procedure Laterality Date    BREAST BIOPSY Left 10 yrs ago    benign    CATARACT EXTRACTION Left     OS      SECTION      x2    EYE SURGERY      HYSTERECTOMY      partial    OOPHORECTOMY      one ovary conserved    RETINAL DETACHMENT SURGERY      buckle --OS    TONSILLECTOMY       Family History   Problem Relation Age of Onset    Arthritis Father     Heart disease Father     Early death Sister 30        heart disease    Heart disease Sister 32        MI    Cancer Brother         Lung cancer    No Known Problems Daughter     Blindness Son         due to accident//    Arthritis Sister     Heart disease Sister     Crohn's disease Sister     Alcohol abuse Mother     Breast cancer Neg Hx     Glaucoma Neg Hx     Macular degeneration Neg Hx     Retinal detachment Neg Hx     Amblyopia Neg Hx     Diabetes Neg Hx     Cataracts Neg Hx     Hypertension Neg Hx     Strabismus Neg Hx     Stroke Neg Hx     Thyroid disease Neg Hx      Social History     Tobacco Use    Smoking status: Current Some Day Smoker     Packs/day: 0.25     Years: 50.00     Pack years: 12.50     Types: Cigarettes     Start date: 1968    Smokeless tobacco: Never Used   Substance Use Topics    Alcohol use: No     Alcohol/week: 0.0 oz     Frequency: Never    Drug use: No     Review of Systems   Constitutional: Negative for chills and fever.   HENT: Negative for facial swelling.    Respiratory: Negative for cough and shortness of breath.    Cardiovascular: Negative for chest pain.   Gastrointestinal: Negative for nausea.   Musculoskeletal: Negative for gait problem and joint swelling.   Skin: Positive for wound (left abdomen). Negative for pallor and rash.   Neurological: Negative for dizziness and  light-headedness.   Hematological: Bruises/bleeds easily (Eliquis).   Psychiatric/Behavioral: The patient is not nervous/anxious.        Physical Exam     Initial Vitals [06/27/19 1208]   BP Pulse Resp Temp SpO2   135/60 84 20 97.4 °F (36.3 °C) 96 %      MAP       --         Physical Exam    Nursing note and vitals reviewed.  Constitutional: She is Obese . No distress.   Morbidly obese.    HENT:   Head: Normocephalic and atraumatic.   Nose: Nose normal.   Eyes: EOM are normal.   Neck: Normal range of motion. Neck supple.   Cardiovascular: Normal rate, regular rhythm, normal heart sounds and intact distal pulses. Exam reveals no gallop and no friction rub.    No murmur heard.  Pulmonary/Chest: Breath sounds normal. No stridor. No respiratory distress. She has no wheezes. She has no rhonchi. She has no rales.   Abdominal: Bowel sounds are normal.   Erythema, ulcerations, and erosion of the skin with continuous bleeding in left abdominal wall.    Musculoskeletal: Normal range of motion.   Neurological: She is alert and oriented to person, place, and time. No cranial nerve deficit.   Skin: Skin is warm and dry. No rash noted.   Psychiatric: She has a normal mood and affect.         ED Course   Procedures  Labs Reviewed   CBC W/ AUTO DIFFERENTIAL - Abnormal; Notable for the following components:       Result Value    Mean Corpuscular Hemoglobin Conc 30.6 (*)     All other components within normal limits   COMPREHENSIVE METABOLIC PANEL   LIPASE   PROTIME-INR   TYPE & SCREEN          Imaging Results    None          Medical Decision Making:   History:   Old Medical Records: I decided to obtain old medical records.  Clinical Tests:   Lab Tests: Reviewed and Ordered  Other:   I have discussed this case with another health care provider.            Scribe Attestation:   Scribe #1: I performed the above scribed service and the documentation accurately describes the services I performed. I attest to the accuracy of the  note.        Attending Attestation:     Physician Attestation for Scribe:    I, Dr. Conner Morton, personally performed the services described in this documentation.   All medical record entries made by the scribe were at my direction and in my presence.   I have reviewed the chart and agree that the record is accurate and complete.   Conner Morton MD  11:42 AM 06/29/2019     DISCLAIMER: This note was prepared with [employer] voice recognition transcription software. Garbled syntax, mangled pronouns, and other bizarre constructions may be attributed to that software system.            ED Course as of Jun 29 1141   Thu Jun 27, 2019   1218 67 yo F with pmhx of COPD, CHF, HTN, DM2, A fib on Eloquis, MI, morbid obesity, recurrent panniculitis and large L sided abdominal wall hernia pw bleeding.     [BD]   1237 Hemodynamically stable. Placed hemostatic pressure dressing on wound.  Spoke to Dr. Rojas, general surgery who is aware of patient and likely venous vs possible arterial bleed.  Dr. Rojas is working on getting someone closer or coming himself to fix patient's medical problem.    [BD]   1353 Spoke to Dr. Lucero who came and evaluated patient. Dr. Lucero sutured wound at bedside and bleeding now controlled. Dr. Lucero agrees patient stable for discharge and recommends pt fu closely outpatient for possible surgery.     [BD]   1442 Labs reassuring. Pt already scheduled fu appointment with surgery for next week on 7/3. Recommend fu with PCP tom and Monday for wound recheck. Pt understands and agrees with discharge instructions. Pt also given strict return precautions for any new or worsening symptoms and plans to follow up closely with PCP or ED for wound recheck in 24 hours.       [BD]      ED Course User Index  [BD] Conner Morton MD     Clinical Impression:       ICD-10-CM ICD-9-CM   1. Venous bleed R58 459.0   2. Abdominal wall hernia K43.9 553.20         Disposition:   Disposition:  Discharged  Condition: Stable                        Conner Morton MD  06/29/19 1018

## 2019-07-03 ENCOUNTER — OFFICE VISIT (OUTPATIENT)
Dept: BARIATRICS | Facility: CLINIC | Age: 69
End: 2019-07-03
Payer: MEDICARE

## 2019-07-03 VITALS
DIASTOLIC BLOOD PRESSURE: 63 MMHG | HEIGHT: 68 IN | SYSTOLIC BLOOD PRESSURE: 133 MMHG | RESPIRATION RATE: 16 BRPM | HEART RATE: 72 BPM | TEMPERATURE: 98 F | WEIGHT: 256.81 LBS | BODY MASS INDEX: 38.92 KG/M2

## 2019-07-03 DIAGNOSIS — S31.109D OPEN WOUND OF LATERAL ABDOMINAL WALL, SUBSEQUENT ENCOUNTER: Primary | ICD-10-CM

## 2019-07-03 DIAGNOSIS — K45.8 FLANK HERNIA: ICD-10-CM

## 2019-07-03 PROCEDURE — 3078F PR MOST RECENT DIASTOLIC BLOOD PRESSURE < 80 MM HG: ICD-10-PCS | Mod: CPTII,S$GLB,, | Performed by: SURGERY

## 2019-07-03 PROCEDURE — 99213 PR OFFICE/OUTPT VISIT, EST, LEVL III, 20-29 MIN: ICD-10-PCS | Mod: S$GLB,,, | Performed by: SURGERY

## 2019-07-03 PROCEDURE — 99999 PR PBB SHADOW E&M-EST. PATIENT-LVL V: ICD-10-PCS | Mod: PBBFAC,,, | Performed by: SURGERY

## 2019-07-03 PROCEDURE — 99213 OFFICE O/P EST LOW 20 MIN: CPT | Mod: S$GLB,,, | Performed by: SURGERY

## 2019-07-03 PROCEDURE — 3078F DIAST BP <80 MM HG: CPT | Mod: CPTII,S$GLB,, | Performed by: SURGERY

## 2019-07-03 PROCEDURE — 3075F SYST BP GE 130 - 139MM HG: CPT | Mod: CPTII,S$GLB,, | Performed by: SURGERY

## 2019-07-03 PROCEDURE — 1101F PT FALLS ASSESS-DOCD LE1/YR: CPT | Mod: CPTII,S$GLB,, | Performed by: SURGERY

## 2019-07-03 PROCEDURE — 99999 PR PBB SHADOW E&M-EST. PATIENT-LVL V: CPT | Mod: PBBFAC,,, | Performed by: SURGERY

## 2019-07-03 PROCEDURE — 3075F PR MOST RECENT SYSTOLIC BLOOD PRESS GE 130-139MM HG: ICD-10-PCS | Mod: CPTII,S$GLB,, | Performed by: SURGERY

## 2019-07-03 PROCEDURE — 1101F PR PT FALLS ASSESS DOC 0-1 FALLS W/OUT INJ PAST YR: ICD-10-PCS | Mod: CPTII,S$GLB,, | Performed by: SURGERY

## 2019-07-10 ENCOUNTER — TELEPHONE (OUTPATIENT)
Dept: BARIATRICS | Facility: CLINIC | Age: 69
End: 2019-07-10

## 2019-07-10 NOTE — TELEPHONE ENCOUNTER
----- Message from Maria Teresa Rahman sent at 7/10/2019  9:24 AM CDT -----  Contact: patient  Type: Needs Medical Advice    Who Called:  patient  Symptoms (please be specific):  na  How long has patient had these symptoms:  jose  Pharmacy name and phone #:  jose  Best Call Back Number: [phone] (home)     Additional Information: Patient states she would like to change her apt. To Friday, due to the severe weather predicted for tomorrow.  Please call to advise and schedule. Thanks!

## 2019-07-11 ENCOUNTER — OFFICE VISIT (OUTPATIENT)
Dept: SURGERY | Facility: CLINIC | Age: 69
End: 2019-07-11
Payer: MEDICARE

## 2019-07-11 VITALS
RESPIRATION RATE: 16 BRPM | HEIGHT: 68 IN | DIASTOLIC BLOOD PRESSURE: 72 MMHG | WEIGHT: 250.75 LBS | HEART RATE: 72 BPM | SYSTOLIC BLOOD PRESSURE: 116 MMHG | TEMPERATURE: 98 F | BODY MASS INDEX: 38 KG/M2

## 2019-07-11 DIAGNOSIS — K45.8 HERNIA OF FLANK: Primary | ICD-10-CM

## 2019-07-11 DIAGNOSIS — F17.200 TOBACCO DEPENDENCY: ICD-10-CM

## 2019-07-11 PROCEDURE — 3078F DIAST BP <80 MM HG: CPT | Mod: CPTII,S$GLB,, | Performed by: SURGERY

## 2019-07-11 PROCEDURE — 99213 PR OFFICE/OUTPT VISIT, EST, LEVL III, 20-29 MIN: ICD-10-PCS | Mod: S$GLB,,, | Performed by: SURGERY

## 2019-07-11 PROCEDURE — 99999 PR PBB SHADOW E&M-EST. PATIENT-LVL III: ICD-10-PCS | Mod: PBBFAC,,, | Performed by: SURGERY

## 2019-07-11 PROCEDURE — 1101F PR PT FALLS ASSESS DOC 0-1 FALLS W/OUT INJ PAST YR: ICD-10-PCS | Mod: CPTII,S$GLB,, | Performed by: SURGERY

## 2019-07-11 PROCEDURE — 1101F PT FALLS ASSESS-DOCD LE1/YR: CPT | Mod: CPTII,S$GLB,, | Performed by: SURGERY

## 2019-07-11 PROCEDURE — 3078F PR MOST RECENT DIASTOLIC BLOOD PRESSURE < 80 MM HG: ICD-10-PCS | Mod: CPTII,S$GLB,, | Performed by: SURGERY

## 2019-07-11 PROCEDURE — 3074F SYST BP LT 130 MM HG: CPT | Mod: CPTII,S$GLB,, | Performed by: SURGERY

## 2019-07-11 PROCEDURE — 99999 PR PBB SHADOW E&M-EST. PATIENT-LVL III: CPT | Mod: PBBFAC,,, | Performed by: SURGERY

## 2019-07-11 PROCEDURE — 99213 OFFICE O/P EST LOW 20 MIN: CPT | Mod: S$GLB,,, | Performed by: SURGERY

## 2019-07-11 PROCEDURE — 3074F PR MOST RECENT SYSTOLIC BLOOD PRESSURE < 130 MM HG: ICD-10-PCS | Mod: CPTII,S$GLB,, | Performed by: SURGERY

## 2019-07-11 RX ORDER — IBUPROFEN 200 MG
TABLET ORAL
Refills: 3 | COMMUNITY
Start: 2019-07-06 | End: 2019-08-16 | Stop reason: ALTCHOICE

## 2019-07-11 NOTE — PROGRESS NOTES
Medically Supervised Weight Loss Documentation    Date of Visit: 07/17/2019    Patient: Nelly Tian    Current Weight: 250  Current BMI: Body mass index is 38.13 kg/m².  Weight Change: -1    Last Weight: 251     Beginning Weight: 251      Vitals:   Vitals:    07/11/19 1113   BP: 116/72   Pulse: 72   Resp: 16   Temp: 97.6 °F (36.4 °C)       Comorbidities:   Past Medical History:   Diagnosis Date    *Atrial flutter     Angina pectoris 9/18/2017    Anxiety     Arthritis     Asthma     Atrial fibrillation     Back pain     Cataract     OD    CHF (congestive heart failure)     COPD (chronic obstructive pulmonary disease)     Depression     Diabetes mellitus     Emphysema of lung     Heart failure     Hepatomegaly 2/3/2016    Hernia     History of MI (myocardial infarction) 1/19/2016    Hypercapnic respiratory failure, chronic 11/16/2016    Hyperlipidemia     Hypertension     Iron deficiency anemia 2/3/2016    Myocardial infarction     Obesity     Peripheral vascular disease     Pneumonia     Polyneuropathy     Retinal detachment     OS    Septic shock 4/23/2017    Skin ulcer 3/18/2017    Tobacco dependence     Type II 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, not stated as uncontrolled(250.60)        Medications:  Current Outpatient Medications on File Prior to Visit   Medication Sig Dispense Refill    albuterol (ACCUNEB) 0.63 mg/3 mL Nebu Take 3 mLs (0.63 mg total) by nebulization every 6 (six) hours as needed. Rescue 75 mL 11    albuterol (PROVENTIL/VENTOLIN HFA) 90 mcg/actuation inhaler INHALE 2 PUFFS INTO THE LUNGS EVERY 6 (SIX) HOURS AS NEEDED FOR RESCUE 54 g 3    apixaban (ELIQUIS) 5 mg Tab Take 1 tablet (5 mg total) by mouth 2 (two) times daily. 180 tablet 3    ascorbic acid, vitamin C, (VITAMIN C) 500 MG tablet Take 1 tablet (500 mg total) by mouth every evening. (Patient taking differently: Take 1,000 mg by mouth 2 (two) times daily. )       atorvastatin (LIPITOR) 20 MG tablet Take 1 tablet (20 mg total) by mouth once daily. 90 tablet 3    blood sugar diagnostic Strp To check BG 3 times daily, to use with insurance preferred meter. 300 strip 11    blood-glucose meter (TRUE METRIX AIR GLUCOSE METER) kit AC meals. Insulin dependent.Dispence strips and lancets 3 months. 1 each 0    BREO ELLIPTA 100-25 mcg/dose diskus inhaler INHALE 1 PUFF INTO THE LUNGS ONCE DAILY. 60 each 2    clonazePAM (KLONOPIN) 0.5 MG tablet Take 1 tablet (0.5 mg total) by mouth 2 (two) times daily as needed for Anxiety. TAKE ONE TABLET BY MOUTH TWO TIMES A DAY AS NEEDED 60 tablet 0    fluticasone (FLONASE) 50 mcg/actuation nasal spray 1 spray (50 mcg total) by Each Nare route once daily. 1 Bottle 2    furosemide (LASIX) 40 MG tablet TAKE 1 TABLET ONE TIME DAILY  FOR  FLUID  OVERLOAD 90 tablet 3    gabapentin (NEURONTIN) 800 MG tablet Take 1 tablet (800 mg total) by mouth 3 (three) times daily. 270 tablet 3    HYDROcodone-acetaminophen (NORCO)  mg per tablet Take 1 tablet by mouth every 8 (eight) hours as needed for Pain (Do not take more than 3 a day. Do not mix with other narcotics.). 90 tablet 0    lancets Misc To check BG 3 times daily, to use with insurance preferred meter. 300 each 11    levalbuterol (XOPENEX) 0.63 mg/3 mL nebulizer solution INHALE THE CONTENTS OF 1 VIAL BY NEBULIZATION 4 times  DAILY.    DX: J43.9 792 mL 3    lisinopril (PRINIVIL,ZESTRIL) 20 MG tablet Take 1 tablet (20 mg total) by mouth once daily. 90 tablet 1    loratadine (CLARITIN) 10 mg tablet Take 1 tablet (10 mg total) by mouth once daily.  0    metFORMIN (GLUCOPHAGE) 500 MG tablet TAKE 1 TABLET (500 MG TOTAL) BY MOUTH 2 (TWO) TIMES DAILY WITH MEALS. 180 tablet 3    metoprolol succinate (TOPROL-XL) 25 MG 24 hr tablet Take 0.5 tablets (12.5 mg total) by mouth once daily. 45 tablet 3    multivitamin (THERAGRAN) tablet Take 1 tablet by mouth once daily.      nicotine (NICODERM CQ)  14 mg/24 hr   3    nystatin (NYSTOP) powder Apply topically 2 (two) times daily. 120 g 1    nystatin-triamcinolone (MYCOLOG II) cream Apply topically 3 (three) times daily. 30 g 1    omega-3 acid ethyl esters (LOVAZA) 1 gram capsule 2 (two) times daily.       omeprazole (PRILOSEC) 40 MG capsule Take 1 capsule (40 mg total) by mouth once daily. 90 capsule 3    polyethylene glycol (GLYCOLAX) 17 gram/dose powder       potassium chloride SA (KLOR-CON M20) 20 MEQ tablet Take 1 tablet (20 mEq total) by mouth once daily. 30 tablet 6    spironolactone (ALDACTONE) 25 MG tablet Take 1 tablet (25 mg total) by mouth once daily. 30 tablet 11    traZODone (DESYREL) 100 MG tablet Take 1.5 tablets (150 mg total) by mouth every evening. 90 tablet 1    ondansetron (ZOFRAN-ODT) 8 MG TbDL Take 1 tablet (8 mg total) by mouth every 6 (six) hours as needed. 20 tablet 3    senna-docusate 8.6-50 mg (PERICOLACE) 8.6-50 mg per tablet Take 1 tablet by mouth 2 (two) times daily as needed for Constipation.       No current facility-administered medications on file prior to visit.        Diet Education Discussed:    Starting to follow the diet plan  Mostly meats and vegetables    Exercise/Activity Discussed:    Walking some when she can    Behavior or Diet Goals for this patient:    Low carb, low cely diet  As much walking as possible  Sutures removed from wound  Planning for hernia repair as she loses weight- will be major surgery    Will need cardiac clearance the closer we get to the surgery. We are considering having surgery in October if she can get closer to 200 pounds.    She is stopping the cigarettes.    1. Hernia of flank    2. Tobacco dependency          : I met with the patient for 15 minutes and counseled her for over 50% of that time

## 2019-07-14 PROBLEM — K46.9 HERNIA: Status: ACTIVE | Noted: 2019-07-14

## 2019-07-14 NOTE — PROGRESS NOTES
Started following diet plan    Vitals:    07/03/19 1415   BP: 133/63   Pulse: 72   Resp: 16   Temp: 98.3 °F (36.8 °C)     Wound healing well- sutures in place no bleeding    A/P    Large ventral hernia with skin erosion and bleeding    Continue sutures another week  Diet for weight loss  Plan repair as she loses weight.  No more smoking

## 2019-07-22 DIAGNOSIS — E11.40 TYPE 2 DIABETES MELLITUS WITH DIABETIC NEUROPATHY, WITHOUT LONG-TERM CURRENT USE OF INSULIN: ICD-10-CM

## 2019-07-22 DIAGNOSIS — J41.8 MIXED SIMPLE AND MUCOPURULENT CHRONIC BRONCHITIS: ICD-10-CM

## 2019-07-22 RX ORDER — DULOXETIN HYDROCHLORIDE 30 MG/1
CAPSULE, DELAYED RELEASE ORAL
Qty: 90 CAPSULE | Refills: 4 | Status: SHIPPED | OUTPATIENT
Start: 2019-07-22 | End: 2019-08-16

## 2019-07-22 RX ORDER — BUDESONIDE 0.5 MG/2ML
INHALANT ORAL
Qty: 180 ML | Refills: 4 | Status: SHIPPED | OUTPATIENT
Start: 2019-07-22 | End: 2019-08-16 | Stop reason: ALTCHOICE

## 2019-07-22 RX ORDER — FLUTICASONE FUROATE AND VILANTEROL TRIFENATATE 100; 25 UG/1; UG/1
POWDER RESPIRATORY (INHALATION)
Qty: 60 EACH | Refills: 2 | Status: SHIPPED | OUTPATIENT
Start: 2019-07-22 | End: 2019-09-22 | Stop reason: SDUPTHER

## 2019-07-22 RX ORDER — CALCIUM CITRATE/VITAMIN D3 200MG-6.25
TABLET ORAL
Qty: 300 STRIP | Refills: 3 | Status: SHIPPED | OUTPATIENT
Start: 2019-07-22 | End: 2021-01-15 | Stop reason: CLARIF

## 2019-07-24 ENCOUNTER — LAB VISIT (OUTPATIENT)
Dept: LAB | Facility: HOSPITAL | Age: 69
End: 2019-07-24
Attending: INTERNAL MEDICINE
Payer: MEDICARE

## 2019-07-24 DIAGNOSIS — M51.36 LUMBAR DEGENERATIVE DISC DISEASE: ICD-10-CM

## 2019-07-24 DIAGNOSIS — Z86.2 HISTORY OF ANEMIA: ICD-10-CM

## 2019-07-24 DIAGNOSIS — E11.9 TYPE 2 DIABETES MELLITUS WITHOUT COMPLICATION: ICD-10-CM

## 2019-07-24 DIAGNOSIS — M43.12 SPONDYLOLISTHESIS OF CERVICAL REGION: ICD-10-CM

## 2019-07-24 LAB
BASOPHILS # BLD AUTO: 0.06 K/UL (ref 0–0.2)
BASOPHILS NFR BLD: 0.8 % (ref 0–1.9)
CHOLEST SERPL-MCNC: 116 MG/DL (ref 120–199)
CHOLEST/HDLC SERPL: 4.5 {RATIO} (ref 2–5)
DIFFERENTIAL METHOD: ABNORMAL
EOSINOPHIL # BLD AUTO: 0.3 K/UL (ref 0–0.5)
EOSINOPHIL NFR BLD: 3.6 % (ref 0–8)
ERYTHROCYTE [DISTWIDTH] IN BLOOD BY AUTOMATED COUNT: 13.6 % (ref 11.5–14.5)
HCT VFR BLD AUTO: 43.9 % (ref 37–48.5)
HDLC SERPL-MCNC: 26 MG/DL (ref 40–75)
HDLC SERPL: 22.4 % (ref 20–50)
HGB BLD-MCNC: 13.1 G/DL (ref 12–16)
IMM GRANULOCYTES # BLD AUTO: 0.02 K/UL (ref 0–0.04)
IRON SERPL-MCNC: 55 UG/DL (ref 30–160)
LDLC SERPL CALC-MCNC: 59.2 MG/DL (ref 63–159)
LYMPHOCYTES # BLD AUTO: 1.8 K/UL (ref 1–4.8)
LYMPHOCYTES NFR BLD: 23 % (ref 18–48)
MCH RBC QN AUTO: 28.6 PG (ref 27–31)
MCHC RBC AUTO-ENTMCNC: 29.8 G/DL (ref 32–36)
MCV RBC AUTO: 96 FL (ref 82–98)
MONOCYTES # BLD AUTO: 0.9 K/UL (ref 0.3–1)
MONOCYTES NFR BLD: 11.8 % (ref 4–15)
NEUTROPHILS # BLD AUTO: 4.8 K/UL (ref 1.8–7.7)
NEUTROPHILS NFR BLD: 60.5 % (ref 38–73)
NONHDLC SERPL-MCNC: 90 MG/DL
NRBC BLD-RTO: 0 /100 WBC
PLATELET # BLD AUTO: 267 K/UL (ref 150–350)
PMV BLD AUTO: 10.6 FL (ref 9.2–12.9)
RBC # BLD AUTO: 4.58 M/UL (ref 4–5.4)
SATURATED IRON: 13 % (ref 20–50)
TOTAL IRON BINDING CAPACITY: 434 UG/DL (ref 250–450)
TRANSFERRIN SERPL-MCNC: 293 MG/DL (ref 200–375)
TRIGL SERPL-MCNC: 154 MG/DL (ref 30–150)
WBC # BLD AUTO: 7.96 K/UL (ref 3.9–12.7)

## 2019-07-24 PROCEDURE — 36415 COLL VENOUS BLD VENIPUNCTURE: CPT

## 2019-07-24 PROCEDURE — 80061 LIPID PANEL: CPT

## 2019-07-24 PROCEDURE — 85025 COMPLETE CBC W/AUTO DIFF WBC: CPT

## 2019-07-24 PROCEDURE — 83540 ASSAY OF IRON: CPT

## 2019-07-24 RX ORDER — NYSTATIN 100000 [USP'U]/G
POWDER TOPICAL 2 TIMES DAILY
Qty: 120 G | Refills: 11 | Status: SHIPPED | OUTPATIENT
Start: 2019-07-24 | End: 2020-07-29 | Stop reason: SDUPTHER

## 2019-07-24 RX ORDER — HYDROCODONE BITARTRATE AND ACETAMINOPHEN 10; 325 MG/1; MG/1
1 TABLET ORAL EVERY 8 HOURS PRN
Qty: 90 TABLET | Refills: 0 | Status: SHIPPED | OUTPATIENT
Start: 2019-07-24 | End: 2019-08-16 | Stop reason: SDUPTHER

## 2019-07-24 RX ORDER — CLONAZEPAM 0.5 MG/1
0.5 TABLET ORAL NIGHTLY
Qty: 30 TABLET | Refills: 2 | Status: SHIPPED | OUTPATIENT
Start: 2019-07-24 | End: 2019-08-16

## 2019-07-24 NOTE — TELEPHONE ENCOUNTER
Pain contract and tox done 12/06/18    LOV 06/19/19  Next office visit 08/16/19    Last refill 06/25/19

## 2019-07-25 ENCOUNTER — OFFICE VISIT (OUTPATIENT)
Dept: SURGERY | Facility: CLINIC | Age: 69
End: 2019-07-25
Payer: MEDICARE

## 2019-07-25 ENCOUNTER — PATIENT MESSAGE (OUTPATIENT)
Dept: FAMILY MEDICINE | Facility: CLINIC | Age: 69
End: 2019-07-25

## 2019-07-25 ENCOUNTER — TELEPHONE (OUTPATIENT)
Dept: FAMILY MEDICINE | Facility: CLINIC | Age: 69
End: 2019-07-25

## 2019-07-25 VITALS
DIASTOLIC BLOOD PRESSURE: 76 MMHG | TEMPERATURE: 97 F | HEIGHT: 68 IN | HEART RATE: 69 BPM | WEIGHT: 246.69 LBS | SYSTOLIC BLOOD PRESSURE: 125 MMHG | RESPIRATION RATE: 17 BRPM | BODY MASS INDEX: 37.39 KG/M2

## 2019-07-25 DIAGNOSIS — M51.36 LUMBAR DEGENERATIVE DISC DISEASE: ICD-10-CM

## 2019-07-25 DIAGNOSIS — E66.01 MORBID OBESITY: ICD-10-CM

## 2019-07-25 DIAGNOSIS — M43.12 SPONDYLOLISTHESIS OF CERVICAL REGION: ICD-10-CM

## 2019-07-25 DIAGNOSIS — K45.8 FLANK HERNIA: Primary | ICD-10-CM

## 2019-07-25 PROCEDURE — 1101F PT FALLS ASSESS-DOCD LE1/YR: CPT | Mod: CPTII,S$GLB,, | Performed by: SURGERY

## 2019-07-25 PROCEDURE — 99213 OFFICE O/P EST LOW 20 MIN: CPT | Mod: S$GLB,,, | Performed by: SURGERY

## 2019-07-25 PROCEDURE — 3078F DIAST BP <80 MM HG: CPT | Mod: CPTII,S$GLB,, | Performed by: SURGERY

## 2019-07-25 PROCEDURE — 99999 PR PBB SHADOW E&M-EST. PATIENT-LVL V: CPT | Mod: PBBFAC,,, | Performed by: SURGERY

## 2019-07-25 PROCEDURE — 3078F PR MOST RECENT DIASTOLIC BLOOD PRESSURE < 80 MM HG: ICD-10-PCS | Mod: CPTII,S$GLB,, | Performed by: SURGERY

## 2019-07-25 PROCEDURE — 99213 PR OFFICE/OUTPT VISIT, EST, LEVL III, 20-29 MIN: ICD-10-PCS | Mod: S$GLB,,, | Performed by: SURGERY

## 2019-07-25 PROCEDURE — 3074F PR MOST RECENT SYSTOLIC BLOOD PRESSURE < 130 MM HG: ICD-10-PCS | Mod: CPTII,S$GLB,, | Performed by: SURGERY

## 2019-07-25 PROCEDURE — 3074F SYST BP LT 130 MM HG: CPT | Mod: CPTII,S$GLB,, | Performed by: SURGERY

## 2019-07-25 PROCEDURE — 1101F PR PT FALLS ASSESS DOC 0-1 FALLS W/OUT INJ PAST YR: ICD-10-PCS | Mod: CPTII,S$GLB,, | Performed by: SURGERY

## 2019-07-25 PROCEDURE — 99999 PR PBB SHADOW E&M-EST. PATIENT-LVL V: ICD-10-PCS | Mod: PBBFAC,,, | Performed by: SURGERY

## 2019-07-25 RX ORDER — HYDROCODONE BITARTRATE AND ACETAMINOPHEN 10; 325 MG/1; MG/1
1 TABLET ORAL EVERY 8 HOURS PRN
Qty: 90 TABLET | Refills: 0 | Status: CANCELLED | OUTPATIENT
Start: 2019-07-25

## 2019-07-25 RX ORDER — CEFADROXIL 500 MG/1
CAPSULE ORAL
Refills: 3 | COMMUNITY
Start: 2019-07-17 | End: 2019-09-24 | Stop reason: SDUPTHER

## 2019-07-25 RX ORDER — NYSTATIN 100000 [USP'U]/G
POWDER TOPICAL 2 TIMES DAILY
Qty: 120 G | Refills: 11 | Status: CANCELLED | OUTPATIENT
Start: 2019-07-25

## 2019-07-25 RX ORDER — CLONAZEPAM 0.5 MG/1
0.5 TABLET ORAL NIGHTLY
Qty: 30 TABLET | Refills: 2 | Status: CANCELLED | OUTPATIENT
Start: 2019-07-25

## 2019-07-25 NOTE — PROGRESS NOTES
Medically Supervised Weight Loss Documentation    Date of Visit: 07/25/2019    Patient: Nelly Tian    Current Weight: 246  Current BMI: Body mass index is 37.51 kg/m².  Weight Change: -5 pounds    Last Weight: 250    Beginning Weight: 251      Vitals:   Vitals:    07/25/19 1049   BP: 125/76   Pulse: 69   Resp: 17   Temp: 97.4 °F (36.3 °C)       Comorbidities:   Past Medical History:   Diagnosis Date    *Atrial flutter     Angina pectoris 9/18/2017    Anxiety     Arthritis     Asthma     Atrial fibrillation     Back pain     Cataract     OD    CHF (congestive heart failure)     COPD (chronic obstructive pulmonary disease)     Depression     Diabetes mellitus     Emphysema of lung     Heart failure     Hepatomegaly 2/3/2016    Hernia     History of MI (myocardial infarction) 1/19/2016    Hypercapnic respiratory failure, chronic 11/16/2016    Hyperlipidemia     Hypertension     Iron deficiency anemia 2/3/2016    Myocardial infarction     Obesity     Peripheral vascular disease     Pneumonia     Polyneuropathy     Retinal detachment     OS    Septic shock 4/23/2017    Skin ulcer 3/18/2017    Tobacco dependence     Type II 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, not stated as uncontrolled(250.60)        Medications:  Current Outpatient Medications on File Prior to Visit   Medication Sig Dispense Refill    albuterol (ACCUNEB) 0.63 mg/3 mL Nebu Take 3 mLs (0.63 mg total) by nebulization every 6 (six) hours as needed. Rescue 75 mL 11    albuterol (PROVENTIL/VENTOLIN HFA) 90 mcg/actuation inhaler INHALE 2 PUFFS INTO THE LUNGS EVERY 6 (SIX) HOURS AS NEEDED FOR RESCUE 54 g 3    apixaban (ELIQUIS) 5 mg Tab Take 1 tablet (5 mg total) by mouth 2 (two) times daily. 180 tablet 3    ascorbic acid, vitamin C, (VITAMIN C) 500 MG tablet Take 1 tablet (500 mg total) by mouth every evening. (Patient taking differently: Take 1,000 mg by mouth 2 (two) times  daily. )      atorvastatin (LIPITOR) 20 MG tablet Take 1 tablet (20 mg total) by mouth once daily. 90 tablet 3    blood-glucose meter (TRUE METRIX AIR GLUCOSE METER) kit AC meals. Insulin dependent.Dispence strips and lancets 3 months. 1 each 0    BREO ELLIPTA 100-25 mcg/dose diskus inhaler INHALE 1 PUFF INTO THE LUNGS ONCE DAILY. 60 each 2    budesonide (PULMICORT) 0.5 mg/2 mL nebulizer solution INHALE THE CONTENTS OF 1 VIAL VIA NEBULIZER EVERY  mL 4    cefadroxil (DURICEF) 500 MG Cap   3    clonazePAM (KLONOPIN) 0.5 MG tablet Take 1 tablet (0.5 mg total) by mouth every evening. TAKE ONE TABLET BY MOUTH TWO TIMES A DAY AS NEEDED 30 tablet 2    DULoxetine (CYMBALTA) 30 MG capsule TAKE 1 CAPSULE ONCE DAILY 90 capsule 4    fluticasone (FLONASE) 50 mcg/actuation nasal spray 1 spray (50 mcg total) by Each Nare route once daily. 1 Bottle 2    furosemide (LASIX) 40 MG tablet TAKE 1 TABLET ONE TIME DAILY  FOR  FLUID  OVERLOAD 90 tablet 3    gabapentin (NEURONTIN) 800 MG tablet Take 1 tablet (800 mg total) by mouth 3 (three) times daily. 270 tablet 3    HYDROcodone-acetaminophen (NORCO)  mg per tablet Take 1 tablet by mouth every 8 (eight) hours as needed for Pain (Do not take more than 3 a day. Do not mix with other narcotics.). 90 tablet 0    lancets Misc To check BG 3 times daily, to use with insurance preferred meter. 300 each 11    levalbuterol (XOPENEX) 0.63 mg/3 mL nebulizer solution INHALE THE CONTENTS OF 1 VIAL BY NEBULIZATION 4 times  DAILY.    DX: J43.9 792 mL 3    lisinopril (PRINIVIL,ZESTRIL) 20 MG tablet Take 1 tablet (20 mg total) by mouth once daily. 90 tablet 1    loratadine (CLARITIN) 10 mg tablet Take 1 tablet (10 mg total) by mouth once daily.  0    metFORMIN (GLUCOPHAGE) 500 MG tablet TAKE 1 TABLET (500 MG TOTAL) BY MOUTH 2 (TWO) TIMES DAILY WITH MEALS. 180 tablet 3    metoprolol succinate (TOPROL-XL) 25 MG 24 hr tablet Take 0.5 tablets (12.5 mg total) by mouth once daily.  45 tablet 3    multivitamin (THERAGRAN) tablet Take 1 tablet by mouth once daily.      nicotine (NICODERM CQ) 14 mg/24 hr   3    nystatin (NYSTOP) powder Apply topically 2 (two) times daily. 120 g 11    nystatin-triamcinolone (MYCOLOG II) cream Apply topically 3 (three) times daily. 30 g 1    omega-3 acid ethyl esters (LOVAZA) 1 gram capsule 2 (two) times daily.       omeprazole (PRILOSEC) 40 MG capsule Take 1 capsule (40 mg total) by mouth once daily. 90 capsule 3    ondansetron (ZOFRAN-ODT) 8 MG TbDL Take 1 tablet (8 mg total) by mouth every 6 (six) hours as needed. 20 tablet 3    polyethylene glycol (GLYCOLAX) 17 gram/dose powder       potassium chloride SA (KLOR-CON M20) 20 MEQ tablet Take 1 tablet (20 mEq total) by mouth once daily. 30 tablet 6    senna-docusate 8.6-50 mg (PERICOLACE) 8.6-50 mg per tablet Take 1 tablet by mouth 2 (two) times daily as needed for Constipation.      spironolactone (ALDACTONE) 25 MG tablet Take 1 tablet (25 mg total) by mouth once daily. 30 tablet 11    traZODone (DESYREL) 100 MG tablet Take 1.5 tablets (150 mg total) by mouth every evening. 90 tablet 1    TRUE METRIX GLUCOSE TEST STRIP Strp TEST BLOOD SUGAR THREE TIMES A DAY  300 strip 3     No current facility-administered medications on file prior to visit.          Body comp:  uto    Diet Education Discussed:    Breakfast:  Boiled eggs, cheese  Pork skins all the time  Lunch:  Meat like pork chop or chicken   Dinner:  Pork skins, pork chop, and vegetables    Exercise/Activity Discussed:    None yet    Behavior or Diet Goals for this patient:    Keep diet low carb and start decreasing portion sizes  Will need to start exercising at least 20 minutes 3 times per week    I think she will also need a panniculectomy at the time of surgery    Planning for hernia repair as she loses weight- will be major surgery     Will need cardiac clearance the closer we get to the surgery. We are considering having surgery in October  if she can get closer to 200 pounds.     She is stopping the cigarettes.     1. Hernia of flank    2. Tobacco dependency        : I met with the patient for 15 minutes and counseled her for over 50% of that time

## 2019-07-25 NOTE — TELEPHONE ENCOUNTER
Per Dr. Bird Clonazepam instructions clarified as take one tablet every evening with 30 tablets dispensed. Nadege with [facility] notified. Call placed to patient for notification of change in dosage of Clonazepam. No answer received. Unable to leave voice mail.

## 2019-07-25 NOTE — TELEPHONE ENCOUNTER
Patient returned call. Patient notified of new dosage/quantity of Clonazepam. Verbalized understanding.

## 2019-07-25 NOTE — TELEPHONE ENCOUNTER
----- Message from Britany Kraus sent at 7/25/2019 11:11 AM CDT -----  Contact: [facility]  [facility] states pt clonazePAM (KLONOPIN) 0.5 MG tablet was e-scribed over to them and have questions about directions so would like a call back for clarification at.  [facility] [address] [facility] [id number] [facility] [id number] [facility] [address]  [place] [address]  Phone: [phone] Fax: [phone]

## 2019-07-26 ENCOUNTER — DOCUMENTATION ONLY (OUTPATIENT)
Dept: HEMATOLOGY/ONCOLOGY | Facility: CLINIC | Age: 69
End: 2019-07-26

## 2019-07-26 ENCOUNTER — OFFICE VISIT (OUTPATIENT)
Dept: HEMATOLOGY/ONCOLOGY | Facility: CLINIC | Age: 69
End: 2019-07-26
Payer: MEDICARE

## 2019-07-26 VITALS
BODY MASS INDEX: 37.39 KG/M2 | HEIGHT: 68 IN | RESPIRATION RATE: 20 BRPM | TEMPERATURE: 98 F | WEIGHT: 246.69 LBS | DIASTOLIC BLOOD PRESSURE: 59 MMHG | SYSTOLIC BLOOD PRESSURE: 124 MMHG | HEART RATE: 81 BPM | OXYGEN SATURATION: 95 %

## 2019-07-26 DIAGNOSIS — K21.9 GASTROESOPHAGEAL REFLUX DISEASE, ESOPHAGITIS PRESENCE NOT SPECIFIED: ICD-10-CM

## 2019-07-26 DIAGNOSIS — D50.9 IRON DEFICIENCY ANEMIA, UNSPECIFIED IRON DEFICIENCY ANEMIA TYPE: ICD-10-CM

## 2019-07-26 DIAGNOSIS — E11.40 TYPE 2 DIABETES MELLITUS WITH DIABETIC NEUROPATHY, WITHOUT LONG-TERM CURRENT USE OF INSULIN: Chronic | ICD-10-CM

## 2019-07-26 DIAGNOSIS — R16.0 HEPATOMEGALY: ICD-10-CM

## 2019-07-26 DIAGNOSIS — Z79.01 LONG TERM CURRENT USE OF ANTICOAGULANT THERAPY: Primary | ICD-10-CM

## 2019-07-26 PROCEDURE — 3288F FALL RISK ASSESSMENT DOCD: CPT | Mod: CPTII,S$GLB,, | Performed by: INTERNAL MEDICINE

## 2019-07-26 PROCEDURE — 1101F PR PT FALLS ASSESS DOC 0-1 FALLS W/OUT INJ PAST YR: ICD-10-PCS | Mod: CPTII,S$GLB,, | Performed by: INTERNAL MEDICINE

## 2019-07-26 PROCEDURE — 99999 PR PBB SHADOW E&M-EST. PATIENT-LVL V: CPT | Mod: PBBFAC,,, | Performed by: INTERNAL MEDICINE

## 2019-07-26 PROCEDURE — 3044F HG A1C LEVEL LT 7.0%: CPT | Mod: CPTII,S$GLB,, | Performed by: INTERNAL MEDICINE

## 2019-07-26 PROCEDURE — 99214 PR OFFICE/OUTPT VISIT, EST, LEVL IV, 30-39 MIN: ICD-10-PCS | Mod: S$GLB,,, | Performed by: INTERNAL MEDICINE

## 2019-07-26 PROCEDURE — 3074F SYST BP LT 130 MM HG: CPT | Mod: CPTII,S$GLB,, | Performed by: INTERNAL MEDICINE

## 2019-07-26 PROCEDURE — 3078F DIAST BP <80 MM HG: CPT | Mod: CPTII,S$GLB,, | Performed by: INTERNAL MEDICINE

## 2019-07-26 PROCEDURE — 99999 PR PBB SHADOW E&M-EST. PATIENT-LVL V: ICD-10-PCS | Mod: PBBFAC,,, | Performed by: INTERNAL MEDICINE

## 2019-07-26 PROCEDURE — 3074F PR MOST RECENT SYSTOLIC BLOOD PRESSURE < 130 MM HG: ICD-10-PCS | Mod: CPTII,S$GLB,, | Performed by: INTERNAL MEDICINE

## 2019-07-26 PROCEDURE — 1101F PT FALLS ASSESS-DOCD LE1/YR: CPT | Mod: CPTII,S$GLB,, | Performed by: INTERNAL MEDICINE

## 2019-07-26 PROCEDURE — 3044F PR MOST RECENT HEMOGLOBIN A1C LEVEL <7.0%: ICD-10-PCS | Mod: CPTII,S$GLB,, | Performed by: INTERNAL MEDICINE

## 2019-07-26 PROCEDURE — 3078F PR MOST RECENT DIASTOLIC BLOOD PRESSURE < 80 MM HG: ICD-10-PCS | Mod: CPTII,S$GLB,, | Performed by: INTERNAL MEDICINE

## 2019-07-26 PROCEDURE — 99214 OFFICE O/P EST MOD 30 MIN: CPT | Mod: S$GLB,,, | Performed by: INTERNAL MEDICINE

## 2019-07-26 PROCEDURE — 3288F PR FALLS RISK ASSESSMENT DOCUMENTED: ICD-10-PCS | Mod: CPTII,S$GLB,, | Performed by: INTERNAL MEDICINE

## 2019-07-26 NOTE — PROGRESS NOTES
CHIEF COMPLAINT:   I need fioricet for headaches     Ms. Tian is a 68 -year-old female who has a history of progressive anemia after  IV iron.    Here today for routine labs for anemia. Post IV iron in the past   She is tolerating Glucophage for diabetes as well as Lexapro for depression and Zestril  for hypertension  Trazodone not helping her sleep  Scared to take OTC medicine on eliquis   She is scheduled to have gastric bypass soon     Past Medical History:   Diagnosis Date    *Atrial flutter     Angina pectoris 9/18/2017    Anxiety     Arthritis     Asthma     Atrial fibrillation     Back pain     Cataract     OD    CHF (congestive heart failure)     COPD (chronic obstructive pulmonary disease)     Depression     Diabetes mellitus     Emphysema of lung     Heart failure     Hepatomegaly 2/3/2016    Hernia     History of MI (myocardial infarction) 1/19/2016    Hypercapnic respiratory failure, chronic 11/16/2016    Hyperlipidemia     Hypertension     Iron deficiency anemia 2/3/2016    Myocardial infarction     Obesity     Peripheral vascular disease     Pneumonia     Polyneuropathy     Retinal detachment     OS    Septic shock 4/23/2017    Skin ulcer 3/18/2017    Tobacco dependence     Type II 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, not stated as uncontrolled(250.60)          Current Outpatient Medications:     albuterol (ACCUNEB) 0.63 mg/3 mL Nebu, Take 3 mLs (0.63 mg total) by nebulization every 6 (six) hours as needed. Rescue, Disp: 75 mL, Rfl: 11    albuterol (PROVENTIL/VENTOLIN HFA) 90 mcg/actuation inhaler, INHALE 2 PUFFS INTO THE LUNGS EVERY 6 (SIX) HOURS AS NEEDED FOR RESCUE, Disp: 54 g, Rfl: 3    apixaban (ELIQUIS) 5 mg Tab, Take 1 tablet (5 mg total) by mouth 2 (two) times daily., Disp: 180 tablet, Rfl: 3    ascorbic acid, vitamin C, (VITAMIN C) 500 MG tablet, Take 1 tablet (500 mg total) by mouth every evening. (Patient taking  differently: Take 1,000 mg by mouth 2 (two) times daily. ), Disp: , Rfl:     atorvastatin (LIPITOR) 20 MG tablet, Take 1 tablet (20 mg total) by mouth once daily., Disp: 90 tablet, Rfl: 3    blood-glucose meter (TRUE METRIX AIR GLUCOSE METER) kit, AC meals. Insulin dependent.Dispence strips and lancets 3 months., Disp: 1 each, Rfl: 0    BREO ELLIPTA 100-25 mcg/dose diskus inhaler, INHALE 1 PUFF INTO THE LUNGS ONCE DAILY., Disp: 60 each, Rfl: 2    budesonide (PULMICORT) 0.5 mg/2 mL nebulizer solution, INHALE THE CONTENTS OF 1 VIAL VIA NEBULIZER EVERY DAY, Disp: 180 mL, Rfl: 4    cefadroxil (DURICEF) 500 MG Cap, , Disp: , Rfl: 3    clonazePAM (KLONOPIN) 0.5 MG tablet, Take 1 tablet (0.5 mg total) by mouth every evening. TAKE ONE TABLET BY MOUTH TWO TIMES A DAY AS NEEDED, Disp: 30 tablet, Rfl: 2    DULoxetine (CYMBALTA) 30 MG capsule, TAKE 1 CAPSULE ONCE DAILY, Disp: 90 capsule, Rfl: 4    fluticasone (FLONASE) 50 mcg/actuation nasal spray, 1 spray (50 mcg total) by Each Nare route once daily., Disp: 1 Bottle, Rfl: 2    furosemide (LASIX) 40 MG tablet, TAKE 1 TABLET ONE TIME DAILY  FOR  FLUID  OVERLOAD, Disp: 90 tablet, Rfl: 3    gabapentin (NEURONTIN) 800 MG tablet, Take 1 tablet (800 mg total) by mouth 3 (three) times daily., Disp: 270 tablet, Rfl: 3    HYDROcodone-acetaminophen (NORCO)  mg per tablet, Take 1 tablet by mouth every 8 (eight) hours as needed for Pain (Do not take more than 3 a day. Do not mix with other narcotics.)., Disp: 90 tablet, Rfl: 0    lancets Misc, To check BG 3 times daily, to use with insurance preferred meter., Disp: 300 each, Rfl: 11    levalbuterol (XOPENEX) 0.63 mg/3 mL nebulizer solution, INHALE THE CONTENTS OF 1 VIAL BY NEBULIZATION 4 times  DAILY.    DX: J43.9, Disp: 792 mL, Rfl: 3    lisinopril (PRINIVIL,ZESTRIL) 20 MG tablet, Take 1 tablet (20 mg total) by mouth once daily., Disp: 90 tablet, Rfl: 1    loratadine (CLARITIN) 10 mg tablet, Take 1 tablet (10 mg  total) by mouth once daily., Disp: , Rfl: 0    metFORMIN (GLUCOPHAGE) 500 MG tablet, TAKE 1 TABLET (500 MG TOTAL) BY MOUTH 2 (TWO) TIMES DAILY WITH MEALS., Disp: 180 tablet, Rfl: 3    metoprolol succinate (TOPROL-XL) 25 MG 24 hr tablet, Take 0.5 tablets (12.5 mg total) by mouth once daily., Disp: 45 tablet, Rfl: 3    multivitamin (THERAGRAN) tablet, Take 1 tablet by mouth once daily., Disp: , Rfl:     nicotine (NICODERM CQ) 14 mg/24 hr, , Disp: , Rfl: 3    nystatin (NYSTOP) powder, Apply topically 2 (two) times daily., Disp: 120 g, Rfl: 11    nystatin-triamcinolone (MYCOLOG II) cream, Apply topically 3 (three) times daily., Disp: 30 g, Rfl: 1    omega-3 acid ethyl esters (LOVAZA) 1 gram capsule, 2 (two) times daily. , Disp: , Rfl:     omeprazole (PRILOSEC) 40 MG capsule, Take 1 capsule (40 mg total) by mouth once daily., Disp: 90 capsule, Rfl: 3    ondansetron (ZOFRAN-ODT) 8 MG TbDL, Take 1 tablet (8 mg total) by mouth every 6 (six) hours as needed., Disp: 20 tablet, Rfl: 3    polyethylene glycol (GLYCOLAX) 17 gram/dose powder, , Disp: , Rfl:     potassium chloride SA (KLOR-CON M20) 20 MEQ tablet, Take 1 tablet (20 mEq total) by mouth once daily., Disp: 30 tablet, Rfl: 6    senna-docusate 8.6-50 mg (PERICOLACE) 8.6-50 mg per tablet, Take 1 tablet by mouth 2 (two) times daily as needed for Constipation., Disp: , Rfl:     spironolactone (ALDACTONE) 25 MG tablet, Take 1 tablet (25 mg total) by mouth once daily., Disp: 30 tablet, Rfl: 11    traZODone (DESYREL) 100 MG tablet, Take 1.5 tablets (150 mg total) by mouth every evening., Disp: 90 tablet, Rfl: 1    TRUE METRIX GLUCOSE TEST STRIP Strp, TEST BLOOD SUGAR THREE TIMES A DAY , Disp: 300 strip, Rfl: 3    Tolerating zofran for nausea and glucophage for DM     REVIEW OF SYSTEMS: extreme exhaustion intentional weight loss  GENERAL:  No progression of fatigue nor SOB  She is obese.  Walking with walker,   HEENT:  No photophobia, No   rhinorrhea  "  RESPIRATORY:   positive congestion int  no colored discharge  No wheezing   CARDIOVASCULAR no   chest pain, NO  palpitations  GASTROINTESTINAL:  No abdominal pain, dysphagia, emesis, diarrhea, or  constipation.    GENITOURINARY:  No urinary frequency, hesitancy  RHEUM:  no arthralgias or joint swelling.  MUSCOLSKELETAL:  No neck pain, back pain, positive  arthralgias  NEUROLOGICAL:  No headaches, positive dizziness, + paresthesias  PSYCH:  No agitation, change in behavior, or anxiety.  ENDOCRINE:  No hot or cold intolerance.      PHYSICAL EXAMINATION:    BP (!) 124/59   Pulse 81   Temp 97.6 °F (36.4 °C)   Resp 20   Ht 5' 8" (1.727 m)   Wt 111.9 kg (246 lb 11.1 oz)   LMP  (LMP Unknown)   SpO2 95%   BMI 37.51 kg/m²     GENERAL:  She is obese.  She is oriented well groomed conversant   PSYCH:  Pleasant  affect.  No anxiety or depression.  HEENT:  Normocephalic.  Lids intact, conjunctivae pale     OP clear,  No palatal pallor.  NECK:  Supple.  Trachea midline.  No palpable abnormalities.  CHEST: CLEAR  BS, no  Fremitus dull to percussion   CV S1S2 with RRR no loud S1 or S2  No PMI   ABDOMEN:  NT, ND.  Normal bowel sounds.  No palpable HSM or mass.  EXTREMITIES:  Legs,  2 +  pitting edema.bilaterally   NEUROLOGICAL:  Patient is ambulating well with walker    SKIN:  Warm, dry.  Ecchymosis evident.  No tenting, petechiae  2mo ago  Iron30 - 160 ug/dL27 Abnormally low  33 Jqbhisrbdbk509 - 375 mg/dL294 301 JJND946 - 450 ug/dL435 445 Saturated Iron20 - 50 %6 Abnormally low  7 Abnormally low   Cfefp1kh ago  IgG 1382 - 929 mg/dL605 IgG 2242 - 700 mg/lO466Vtv  IgG 322 - 176 mg/dL76 IgG 44 - 86 mg/dL72 Comment: Test performed at [facility]     Lab Results   Component Value Date    WBC 7.96 07/24/2019    HGB 13.1 07/24/2019    HCT 43.9 07/24/2019    MCV 96 07/24/2019     07/24/2019     CMP  Sodium   Date Value Ref Range Status   06/27/2019 141 136 - 145 mmol/L Final     Potassium   Date Value Ref Range Status "   06/27/2019 4.4 3.5 - 5.1 mmol/L Final     Chloride   Date Value Ref Range Status   06/27/2019 103 95 - 110 mmol/L Final     CO2   Date Value Ref Range Status   06/27/2019 28 23 - 29 mmol/L Final     Glucose   Date Value Ref Range Status   06/27/2019 87 70 - 110 mg/dL Final     BUN, Bld   Date Value Ref Range Status   06/27/2019 13 8 - 23 mg/dL Final     Creatinine   Date Value Ref Range Status   06/27/2019 0.7 0.5 - 1.4 mg/dL Final     Calcium   Date Value Ref Range Status   06/27/2019 9.9 8.7 - 10.5 mg/dL Final     Total Protein   Date Value Ref Range Status   06/27/2019 7.6 6.0 - 8.4 g/dL Final     Albumin   Date Value Ref Range Status   06/27/2019 3.6 3.5 - 5.2 g/dL Final     Total Bilirubin   Date Value Ref Range Status   06/27/2019 0.3 0.1 - 1.0 mg/dL Final     Comment:     For infants and newborns, interpretation of results should be based  on gestational age, weight and in agreement with clinical  observations.  Premature Infant recommended reference ranges:  Up to 24 hours.............<8.0 mg/dL  Up to 48 hours............<12.0 mg/dL  3-5 days..................<15.0 mg/dL  6-29 days.................<15.0 mg/dL       Alkaline Phosphatase   Date Value Ref Range Status   06/27/2019 74 55 - 135 U/L Final     AST   Date Value Ref Range Status   06/27/2019 15 10 - 40 U/L Final     ALT   Date Value Ref Range Status   06/27/2019 14 10 - 44 U/L Final     Anion Gap   Date Value Ref Range Status   06/27/2019 10 8 - 16 mmol/L Final     eGFR if    Date Value Ref Range Status   06/27/2019 >60 >60 mL/min/1.73 m^2 Final     eGFR if non    Date Value Ref Range Status   06/27/2019 >60 >60 mL/min/1.73 m^2 Final     Comment:     Calculation used to obtain the estimated glomerular filtration  rate (eGFR) is the CKD-EPI equation.             Ref Range & Units 4d ago   Iron 30 - 160 ug/dL 52    Transferrin 200 - 375 mg/dL 257    TIBC 250 - 450 ug/dL 380    Saturated Iron 20 - 50 % 14Low         Long term current use of anticoagulant therapy    Type 2 diabetes mellitus with diabetic neuropathy, without long-term current use of insulin    Iron deficiency anemia, unspecified iron deficiency anemia type    Gastroesophageal reflux disease, esophagitis presence not specified    Hepatomegaly    paroxysmal A fib : she is stable    For gastric bypass with Dr Lucero   No further need for IV iron at this time  Hemoglobin remains stable: D/C from heme and F/U with Dr Bird  RTC prn   SHe does have low IGG2 hence explained if she ever develops an infection that does not respond to the antibiotics she may need IVIG   She will see DR Bird for F/U of headaches : I do not find any evidence of sinusitis today   Cont lipitor to prevent plaques monitored by pcp  Cont lasix to help with edema for BP monitored by pcp  She is to continue eliquis for chronic atrial fibrillation as well as her diabetic medication to help control hyperglycemia due to diabetes

## 2019-07-29 ENCOUNTER — PATIENT MESSAGE (OUTPATIENT)
Dept: FAMILY MEDICINE | Facility: CLINIC | Age: 69
End: 2019-07-29

## 2019-07-29 DIAGNOSIS — E11.9 TYPE 2 DIABETES MELLITUS WITHOUT COMPLICATION: ICD-10-CM

## 2019-07-29 DIAGNOSIS — F17.200 TOBACCO DEPENDENCY: Primary | ICD-10-CM

## 2019-07-30 RX ORDER — VARENICLINE TARTRATE 0.5 (11)-1
KIT ORAL
Qty: 1 PACKAGE | Refills: 0 | Status: SHIPPED | OUTPATIENT
Start: 2019-07-30 | End: 2019-08-16 | Stop reason: ALTCHOICE

## 2019-08-02 ENCOUNTER — PATIENT OUTREACH (OUTPATIENT)
Dept: ADMINISTRATIVE | Facility: HOSPITAL | Age: 69
End: 2019-08-02

## 2019-08-06 RX ORDER — POTASSIUM CHLORIDE 20 MEQ/1
TABLET, EXTENDED RELEASE ORAL
Qty: 30 TABLET | Refills: 11 | Status: SHIPPED | OUTPATIENT
Start: 2019-08-06 | End: 2020-04-30 | Stop reason: SDUPTHER

## 2019-08-11 PROCEDURE — G0179 PR HOME HEALTH MD RECERTIFICATION: ICD-10-PCS | Mod: ,,, | Performed by: FAMILY MEDICINE

## 2019-08-11 PROCEDURE — G0179 MD RECERTIFICATION HHA PT: HCPCS | Mod: ,,, | Performed by: FAMILY MEDICINE

## 2019-08-13 ENCOUNTER — EXTERNAL HOME HEALTH (OUTPATIENT)
Dept: HOME HEALTH SERVICES | Facility: HOSPITAL | Age: 69
End: 2019-08-13
Payer: MEDICARE

## 2019-08-16 ENCOUNTER — PATIENT MESSAGE (OUTPATIENT)
Dept: FAMILY MEDICINE | Facility: CLINIC | Age: 69
End: 2019-08-16

## 2019-08-16 ENCOUNTER — TELEPHONE (OUTPATIENT)
Dept: FAMILY MEDICINE | Facility: CLINIC | Age: 69
End: 2019-08-16

## 2019-08-16 ENCOUNTER — OFFICE VISIT (OUTPATIENT)
Dept: FAMILY MEDICINE | Facility: CLINIC | Age: 69
End: 2019-08-16
Payer: MEDICARE

## 2019-08-16 VITALS
HEART RATE: 48 BPM | HEIGHT: 68 IN | WEIGHT: 242.5 LBS | DIASTOLIC BLOOD PRESSURE: 58 MMHG | SYSTOLIC BLOOD PRESSURE: 100 MMHG | OXYGEN SATURATION: 94 % | TEMPERATURE: 99 F | BODY MASS INDEX: 36.75 KG/M2

## 2019-08-16 DIAGNOSIS — I50.22 CHRONIC SYSTOLIC CONGESTIVE HEART FAILURE: ICD-10-CM

## 2019-08-16 DIAGNOSIS — G89.4 CHRONIC PAIN SYNDROME: Primary | ICD-10-CM

## 2019-08-16 DIAGNOSIS — I50.23 ACUTE ON CHRONIC SYSTOLIC CONGESTIVE HEART FAILURE, NYHA CLASS 3: ICD-10-CM

## 2019-08-16 DIAGNOSIS — M43.12 SPONDYLOLISTHESIS OF CERVICAL REGION: ICD-10-CM

## 2019-08-16 DIAGNOSIS — L89.90 PRESSURE INJURY OF SKIN, UNSPECIFIED INJURY STAGE, UNSPECIFIED LOCATION: ICD-10-CM

## 2019-08-16 DIAGNOSIS — J42 CHRONIC BRONCHITIS, UNSPECIFIED CHRONIC BRONCHITIS TYPE: ICD-10-CM

## 2019-08-16 DIAGNOSIS — Z79.01 LONG TERM CURRENT USE OF ANTICOAGULANT THERAPY: ICD-10-CM

## 2019-08-16 DIAGNOSIS — I50.9 CONGESTIVE HEART FAILURE, NYHA CLASS 3, UNSPECIFIED CONGESTIVE HEART FAILURE TYPE: Primary | ICD-10-CM

## 2019-08-16 DIAGNOSIS — M51.36 LUMBAR DEGENERATIVE DISC DISEASE: ICD-10-CM

## 2019-08-16 PROCEDURE — 99214 OFFICE O/P EST MOD 30 MIN: CPT | Mod: HCNC,S$GLB,, | Performed by: FAMILY MEDICINE

## 2019-08-16 PROCEDURE — 3074F PR MOST RECENT SYSTOLIC BLOOD PRESSURE < 130 MM HG: ICD-10-PCS | Mod: HCNC,CPTII,S$GLB, | Performed by: FAMILY MEDICINE

## 2019-08-16 PROCEDURE — 3078F DIAST BP <80 MM HG: CPT | Mod: HCNC,CPTII,S$GLB, | Performed by: FAMILY MEDICINE

## 2019-08-16 PROCEDURE — 3074F SYST BP LT 130 MM HG: CPT | Mod: HCNC,CPTII,S$GLB, | Performed by: FAMILY MEDICINE

## 2019-08-16 PROCEDURE — 1101F PR PT FALLS ASSESS DOC 0-1 FALLS W/OUT INJ PAST YR: ICD-10-PCS | Mod: HCNC,CPTII,S$GLB, | Performed by: FAMILY MEDICINE

## 2019-08-16 PROCEDURE — 3078F PR MOST RECENT DIASTOLIC BLOOD PRESSURE < 80 MM HG: ICD-10-PCS | Mod: HCNC,CPTII,S$GLB, | Performed by: FAMILY MEDICINE

## 2019-08-16 PROCEDURE — 99999 PR PBB SHADOW E&M-EST. PATIENT-LVL IV: CPT | Mod: PBBFAC,HCNC,, | Performed by: FAMILY MEDICINE

## 2019-08-16 PROCEDURE — 99999 PR PBB SHADOW E&M-EST. PATIENT-LVL IV: ICD-10-PCS | Mod: PBBFAC,HCNC,, | Performed by: FAMILY MEDICINE

## 2019-08-16 PROCEDURE — 1101F PT FALLS ASSESS-DOCD LE1/YR: CPT | Mod: HCNC,CPTII,S$GLB, | Performed by: FAMILY MEDICINE

## 2019-08-16 PROCEDURE — 99214 PR OFFICE/OUTPT VISIT, EST, LEVL IV, 30-39 MIN: ICD-10-PCS | Mod: HCNC,S$GLB,, | Performed by: FAMILY MEDICINE

## 2019-08-16 RX ORDER — SPIRONOLACTONE 25 MG/1
25 TABLET ORAL
Qty: 8 TABLET | Refills: 11 | Status: SHIPPED | OUTPATIENT
Start: 2019-08-19 | End: 2019-12-02 | Stop reason: ALTCHOICE

## 2019-08-16 RX ORDER — FUROSEMIDE 40 MG/1
20 TABLET ORAL DAILY
Qty: 90 TABLET | Refills: 3 | Status: SHIPPED | OUTPATIENT
Start: 2019-08-16 | End: 2019-08-17 | Stop reason: SDUPTHER

## 2019-08-16 RX ORDER — HYDROCODONE BITARTRATE AND ACETAMINOPHEN 10; 325 MG/1; MG/1
1 TABLET ORAL EVERY 8 HOURS PRN
Qty: 90 TABLET | Refills: 0 | Status: SHIPPED | OUTPATIENT
Start: 2019-09-16 | End: 2019-10-15 | Stop reason: SDUPTHER

## 2019-08-16 RX ORDER — FERROUS SULFATE 325(65) MG
325 TABLET ORAL 2 TIMES DAILY
Qty: 60 TABLET | Refills: 3 | COMMUNITY
Start: 2019-08-16 | End: 2020-03-10

## 2019-08-16 RX ORDER — CLONAZEPAM 0.5 MG/1
0.5 TABLET ORAL 2 TIMES DAILY
Qty: 60 TABLET | Refills: 2 | Status: SHIPPED | OUTPATIENT
Start: 2019-08-16 | End: 2019-12-02 | Stop reason: SDUPTHER

## 2019-08-16 RX ORDER — ASCORBIC ACID 500 MG
1000 TABLET ORAL NIGHTLY
COMMUNITY
Start: 2019-08-16

## 2019-08-16 NOTE — TELEPHONE ENCOUNTER
Furosemide prescription contains 2 different sets of instructions. Please clarify and e-scribe new prescription to [facility].

## 2019-08-16 NOTE — PATIENT INSTRUCTIONS

## 2019-08-16 NOTE — TELEPHONE ENCOUNTER
----- Message from Nadira Hardy sent at 8/16/2019  2:59 PM CDT -----  Contact: Lore from [facility]  Type:  Pharmacy Calling to Clarify an RX    Name of Caller:  Lore  Pharmacy Name:    [facility] - [place], [place] - [address] [facility] [address]  [id number] [facility] [address]  [place] [address]  Phone: [phone] Fax: [phone]    Prescription Name:  furosemide (LASIX) 40 MG tablet  What do they need to clarify?:  Calling to clarify directions  Best Call Back Number:  [phone]  Additional Information:  na

## 2019-08-17 DIAGNOSIS — I50.22 CHRONIC SYSTOLIC CONGESTIVE HEART FAILURE: ICD-10-CM

## 2019-08-17 RX ORDER — FUROSEMIDE 40 MG/1
20 TABLET ORAL DAILY
Qty: 15 TABLET | Refills: 2 | Status: SHIPPED | OUTPATIENT
Start: 2019-08-17 | End: 2019-12-02

## 2019-08-18 RX ORDER — HYDROCODONE BITARTRATE AND ACETAMINOPHEN 10; 325 MG/1; MG/1
1 TABLET ORAL EVERY 8 HOURS PRN
Qty: 90 TABLET | Refills: 0 | Status: SHIPPED | OUTPATIENT
Start: 2019-08-18 | End: 2019-09-16 | Stop reason: SDUPTHER

## 2019-08-19 ENCOUNTER — TELEPHONE (OUTPATIENT)
Dept: FAMILY MEDICINE | Facility: CLINIC | Age: 69
End: 2019-08-19

## 2019-08-19 RX ORDER — FUROSEMIDE 40 MG/1
20 TABLET ORAL DAILY
Qty: 15 TABLET | Refills: 3 | Status: SHIPPED | OUTPATIENT
Start: 2019-08-19 | End: 2019-11-12

## 2019-08-19 NOTE — TELEPHONE ENCOUNTER
----- Message from Silverio Dior sent at 8/19/2019  8:55 AM CDT -----  Contact: Patient  Type: Needs Medical Advice    Who Called:  Patient  Best Call Back Number: [phone]  Additional Information: Patient would like to discuss medication. Please call to advise. Thanks!

## 2019-08-20 ENCOUNTER — PATIENT MESSAGE (OUTPATIENT)
Dept: FAMILY MEDICINE | Facility: CLINIC | Age: 69
End: 2019-08-20

## 2019-08-21 ENCOUNTER — OFFICE VISIT (OUTPATIENT)
Dept: SURGERY | Facility: CLINIC | Age: 69
End: 2019-08-21
Payer: MEDICARE

## 2019-08-21 VITALS
HEIGHT: 68 IN | TEMPERATURE: 98 F | HEART RATE: 52 BPM | DIASTOLIC BLOOD PRESSURE: 55 MMHG | SYSTOLIC BLOOD PRESSURE: 115 MMHG | WEIGHT: 247.13 LBS | BODY MASS INDEX: 37.46 KG/M2 | RESPIRATION RATE: 17 BRPM

## 2019-08-21 DIAGNOSIS — E11.40 TYPE 2 DIABETES MELLITUS WITH DIABETIC NEUROPATHY, WITHOUT LONG-TERM CURRENT USE OF INSULIN: ICD-10-CM

## 2019-08-21 DIAGNOSIS — E66.01 MORBID OBESITY WITH BMI OF 40.0-44.9, ADULT: ICD-10-CM

## 2019-08-21 DIAGNOSIS — K45.8 FLANK HERNIA: Primary | ICD-10-CM

## 2019-08-21 PROCEDURE — 3078F PR MOST RECENT DIASTOLIC BLOOD PRESSURE < 80 MM HG: ICD-10-PCS | Mod: HCNC,CPTII,S$GLB, | Performed by: SURGERY

## 2019-08-21 PROCEDURE — 99213 PR OFFICE/OUTPT VISIT, EST, LEVL III, 20-29 MIN: ICD-10-PCS | Mod: HCNC,S$GLB,, | Performed by: SURGERY

## 2019-08-21 PROCEDURE — 99999 PR PBB SHADOW E&M-EST. PATIENT-LVL V: ICD-10-PCS | Mod: PBBFAC,HCNC,, | Performed by: SURGERY

## 2019-08-21 PROCEDURE — 3044F PR MOST RECENT HEMOGLOBIN A1C LEVEL <7.0%: ICD-10-PCS | Mod: HCNC,CPTII,S$GLB, | Performed by: SURGERY

## 2019-08-21 PROCEDURE — 3078F DIAST BP <80 MM HG: CPT | Mod: HCNC,CPTII,S$GLB, | Performed by: SURGERY

## 2019-08-21 PROCEDURE — 1101F PR PT FALLS ASSESS DOC 0-1 FALLS W/OUT INJ PAST YR: ICD-10-PCS | Mod: HCNC,CPTII,S$GLB, | Performed by: SURGERY

## 2019-08-21 PROCEDURE — 3044F HG A1C LEVEL LT 7.0%: CPT | Mod: HCNC,CPTII,S$GLB, | Performed by: SURGERY

## 2019-08-21 PROCEDURE — 3074F SYST BP LT 130 MM HG: CPT | Mod: HCNC,CPTII,S$GLB, | Performed by: SURGERY

## 2019-08-21 PROCEDURE — 3074F PR MOST RECENT SYSTOLIC BLOOD PRESSURE < 130 MM HG: ICD-10-PCS | Mod: HCNC,CPTII,S$GLB, | Performed by: SURGERY

## 2019-08-21 PROCEDURE — 99999 PR PBB SHADOW E&M-EST. PATIENT-LVL V: CPT | Mod: PBBFAC,HCNC,, | Performed by: SURGERY

## 2019-08-21 PROCEDURE — 1101F PT FALLS ASSESS-DOCD LE1/YR: CPT | Mod: HCNC,CPTII,S$GLB, | Performed by: SURGERY

## 2019-08-21 PROCEDURE — 99213 OFFICE O/P EST LOW 20 MIN: CPT | Mod: HCNC,S$GLB,, | Performed by: SURGERY

## 2019-08-21 NOTE — PROGRESS NOTES
Subjective:       Patient ID: Nelly Tian is a 68 y.o. female.    Chief Complaint: Diabetes     Patient abdominal hernia ( severe) chronic w/acute exacerbation. She has a new ulcer with moderate bleed.Dr. Lucero  evaluated patient 06/15/2019. Dr. Lucero sutured wound at bedside and bleeding now controlled. Dr. Lucero agrees patient stable for discharge and recommends pt fu closely outpatient for possible surgery.      Review of Systems   Constitutional: Positive for appetite change. Negative for activity change, chills and diaphoresis.   HENT: Negative.  Negative for facial swelling, hearing loss, nosebleeds, postnasal drip, sneezing and trouble swallowing.    Eyes: Negative for photophobia, pain, discharge, redness, itching and visual disturbance.   Respiratory: Negative for apnea, cough, chest tightness, shortness of breath and wheezing.    Cardiovascular: Negative.  Negative for chest pain, palpitations and leg swelling.   Gastrointestinal: Negative.  Negative for abdominal distention, abdominal pain, anal bleeding, blood in stool and diarrhea.   Endocrine: Negative.  Negative for cold intolerance, heat intolerance, polydipsia, polyphagia and polyuria.   Genitourinary: Negative.  Negative for difficulty urinating, dysuria, frequency, genital sores, hematuria, pelvic pain, urgency, vaginal bleeding and vaginal discharge.   Musculoskeletal: Positive for back pain and myalgias. Negative for gait problem, neck pain and neck stiffness.   Skin: Positive for rash. Negative for color change and pallor.   Allergic/Immunologic: Negative.  Negative for environmental allergies and food allergies.   Neurological: Negative.  Negative for dizziness, tremors, seizures, syncope, speech difficulty, numbness and headaches.   Hematological: Negative for adenopathy.   Psychiatric/Behavioral: Negative for agitation, behavioral problems, confusion, decreased concentration, hallucinations, self-injury and sleep disturbance.  The patient is not nervous/anxious and is not hyperactive.        Patient Active Problem List   Diagnosis    Severe obesity (BMI 35.0-35.9 with comorbidity)    Diabetes mellitus with neuropathy    Long term current use of anticoagulant therapy    Major depression, recurrent, chronic    GERD (gastroesophageal reflux disease)    Tobacco dependency    Chronic atrial fibrillation    Hypertension associated with diabetes    PVD (peripheral vascular disease)    Abdominal aortic atherosclerosis    Dependency on pain medication    Iron deficiency anemia    DDD (degenerative disc disease), lumbar    Type 2 diabetes mellitus with diabetic neuropathy, without long-term current use of insulin    Hyperlipidemia associated with type 2 diabetes mellitus    NSAID long-term use    Mixed restrictive and obstructive lung disease    Hypercapnic respiratory failure, chronic    COPD (chronic obstructive pulmonary disease)    CHF (congestive heart failure)    Hepatomegaly    Chronic combined systolic and diastolic CHF (congestive heart failure)    Decubitus ulcer    Type 2 diabetes mellitus, without long-term current use of insulin    Retinal detachment of left eye with multiple breaks    Chronic pain syndrome    Wound, open, abdominal wall, lateral    Other nonthrombocytopenic purpura    Encephalopathy, metabolic    Chronically on benzodiazepine therapy    Chronic prescription opiate use    Hernia       Objective:      Physical Exam   Constitutional: She is oriented to person, place, and time. She appears well-developed.   HENT:   Head: Normocephalic and atraumatic.   Right Ear: External ear normal.   Left Ear: External ear normal.   Nose: Nose normal.   Mouth/Throat: No oropharyngeal exudate.   Eyes: Pupils are equal, round, and reactive to light. Conjunctivae and EOM are normal. Right eye exhibits no discharge. Left eye exhibits no discharge. No scleral icterus.   Neck: Normal range of motion. Neck  supple. No JVD present. No tracheal deviation present. No thyromegaly present.   Cardiovascular: Normal rate, normal heart sounds and intact distal pulses. Exam reveals no gallop and no friction rub.   No murmur heard.  Pulmonary/Chest: Effort normal. No stridor. No respiratory distress. She has no wheezes. She has no rales. She exhibits no tenderness.   Abdominal: Soft. Bowel sounds are normal. She exhibits no distension and no mass. There is no tenderness. There is no rebound and no guarding.   Huge left ventral hernia.   Musculoskeletal: Normal range of motion. She exhibits no edema.   Lymphadenopathy:     She has no cervical adenopathy.   Neurological: She is alert and oriented to person, place, and time. She displays normal reflexes. No cranial nerve deficit. She exhibits normal muscle tone. Coordination normal.   Skin: Skin is dry. No rash noted. She is not diaphoretic. No erythema. No pallor.   Psychiatric: She has a normal mood and affect. Her behavior is normal. Judgment and thought content normal.   Vitals reviewed.      Lab Results   Component Value Date    WBC 7.96 07/24/2019    HGB 13.1 07/24/2019    HCT 43.9 07/24/2019     07/24/2019    CHOL 116 (L) 07/24/2019    TRIG 154 (H) 07/24/2019    HDL 26 (L) 07/24/2019    ALT 14 06/27/2019    AST 15 06/27/2019     06/27/2019    K 4.4 06/27/2019     06/27/2019    CREATININE 0.7 06/27/2019    BUN 13 06/27/2019    CO2 28 06/27/2019    TSH 0.405 05/03/2019    INR 1.0 06/27/2019    HGBA1C 5.6 01/19/2019     The ASCVD Risk score ([place] YOLANDA Jr., et al., 2013) failed to calculate for the following reasons:    The patient has a prior MI or stroke diagnosis    Assessment:       1. Chronic pain syndrome    2. Pressure injury of skin, unspecified injury stage, unspecified location    3. Chronic bronchitis, unspecified chronic bronchitis type    4. Long term current use of anticoagulant therapy    5. CHF (congestive heart failure), NYHA class III    6.  Chronic systolic congestive heart failure    7. Lumbar degenerative disc disease    8. Spondylolisthesis of cervical region        Plan:       Chronic pain syndrome    Pressure injury of skin, unspecified injury stage, unspecified location    Chronic bronchitis, unspecified chronic bronchitis type    Long term current use of anticoagulant therapy    CHF (congestive heart failure), NYHA class III  -     spironolactone (ALDACTONE) 25 MG tablet; Take 1 tablet (25 mg total) by mouth twice a week.  Dispense: 8 tablet; Refill: 11    Chronic systolic congestive heart failure  -     Discontinue: furosemide (LASIX) 40 MG tablet; Take 0.5 tablets (20 mg total) by mouth once daily. TAKE 1 TABLET ONE TIME DAILY  FOR  FLUID  OVERLOAD  Dispense: 90 tablet; Refill: 3    Lumbar degenerative disc disease  -     clonazePAM (KLONOPIN) 0.5 MG tablet; Take 1 tablet (0.5 mg total) by mouth 2 (two) times daily. TAKE ONE TABLET BY MOUTH TWO TIMES A DAY AS NEEDED. Medically necessary  Dispense: 60 tablet; Refill: 2  -     HYDROcodone-acetaminophen (NORCO)  mg per tablet; Take 1 tablet by mouth every 8 (eight) hours as needed for Pain. Medical necessary  Dispense: 90 tablet; Refill: 0  -     HYDROcodone-acetaminophen (NORCO)  mg per tablet; Take 1 tablet by mouth every 8 (eight) hours as needed for Pain. Medical necessary  Dispense: 90 tablet; Refill: 0    Spondylolisthesis of cervical region  -     HYDROcodone-acetaminophen (NORCO)  mg per tablet; Take 1 tablet by mouth every 8 (eight) hours as needed for Pain. Medical necessary  Dispense: 90 tablet; Refill: 0  -     HYDROcodone-acetaminophen (NORCO)  mg per tablet; Take 1 tablet by mouth every 8 (eight) hours as needed for Pain. Medical necessary  Dispense: 90 tablet; Refill: 0    Other orders  -     ascorbic acid, vitamin C, (VITAMIN C) 500 MG tablet; Take 2 tablets (1,000 mg total) by mouth every evening.  -     ferrous sulfate (FEOSOL) 325 mg (65 mg iron) Tab  tablet; Take 1 tablet (325 mg total) by mouth 2 (two) times daily.  Dispense: 60 tablet; Refill: 3      Patient readiness: acceptance and barriers:readiness and social stressors    During the course of the visit the patient was educated and counseled about the following:     Diabetes:  Discussed general issues about diabetes pathophysiology and management.  Counseling at today's visit: focused on the need to adhere to the prescribed ADA diet and discussed the advantages of a diet low in carbohydrates.  Educational material distributed.  Hypertension:   Screening for causes of secondary hypertension: GFR (chronic kidney disease).  Dietary sodium restriction.  Regular aerobic exercise.  Check blood pressures 3 times daily and record.  Obesity:   General weight loss/lifestyle modification strategies discussed (elicit support from others; identify saboteurs; non-food rewards, etc).  Regular aerobic exercise program discussed.    Goals: Diabetes: Maintain Hemoglobin A1C below 7, Hypertension: Reduce Blood Pressure and Obesity: Reduce calorie intake and BMI    Did patient meet goals/outcomes: Yes    The following self management tools provided: blood pressure log  blood glucose log    Patient Instructions (the written plan) was given to the patient/family.     Time spent with patient: 30 minutes    Barriers to medications present (yes )    Adverse reactions to current medications (yes)    Over the counter medications reviewed (Yes)        30-40 minutes spent counseling patient on diet, exercise, and weight loss.  This has been fully explained to the patient, who indicates understanding.

## 2019-08-21 NOTE — PROGRESS NOTES
Medically Supervised Weight Loss Documentation    Date of Visit: 09/03/2019    Patient: Nelly Tian    Current Weight: 247  Current BMI: Body mass index is 37.58 kg/m².  Weight Change: -4 pounds    Last Weight: 246    Beginning Weight:  251      Vitals:   Vitals:    08/21/19 1100   BP: (!) 115/55   Pulse: (!) 52   Resp: 17   Temp: 97.5 °F (36.4 °C)       Comorbidities:   Past Medical History:   Diagnosis Date    *Atrial flutter     Angina pectoris 9/18/2017    Anxiety     Arthritis     Asthma     Atrial fibrillation     Back pain     Cataract     OD    CHF (congestive heart failure)     COPD (chronic obstructive pulmonary disease)     Depression     Diabetes mellitus     Emphysema of lung     Heart failure     Hepatomegaly 2/3/2016    Hernia     History of MI (myocardial infarction) 1/19/2016    Hypercapnic respiratory failure, chronic 11/16/2016    Hyperlipidemia     Hypertension     Iron deficiency anemia 2/3/2016    Myocardial infarction     Obesity     Peripheral vascular disease     Pneumonia     Polyneuropathy     Retinal detachment     OS    Septic shock 4/23/2017    Skin ulcer 3/18/2017    Tobacco dependence     Type II 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, not stated as uncontrolled(250.60)        Medications:  Current Outpatient Medications on File Prior to Visit   Medication Sig Dispense Refill    albuterol (ACCUNEB) 0.63 mg/3 mL Nebu Take 3 mLs (0.63 mg total) by nebulization every 6 (six) hours as needed. Rescue 75 mL 11    albuterol (PROVENTIL/VENTOLIN HFA) 90 mcg/actuation inhaler INHALE 2 PUFFS INTO THE LUNGS EVERY 6 (SIX) HOURS AS NEEDED FOR RESCUE 54 g 3    apixaban (ELIQUIS) 5 mg Tab Take 1 tablet (5 mg total) by mouth 2 (two) times daily. 180 tablet 3    ascorbic acid, vitamin C, (VITAMIN C) 500 MG tablet Take 2 tablets (1,000 mg total) by mouth every evening.      atorvastatin (LIPITOR) 20 MG tablet Take 1 tablet (20  mg total) by mouth once daily. 90 tablet 3    blood-glucose meter (TRUE METRIX AIR GLUCOSE METER) kit AC meals. Insulin dependent.Dispence strips and lancets 3 months. 1 each 0    BREO ELLIPTA 100-25 mcg/dose diskus inhaler INHALE 1 PUFF INTO THE LUNGS ONCE DAILY. 60 each 2    cefadroxil (DURICEF) 500 MG Cap   3    clonazePAM (KLONOPIN) 0.5 MG tablet Take 1 tablet (0.5 mg total) by mouth 2 (two) times daily. TAKE ONE TABLET BY MOUTH TWO TIMES A DAY AS NEEDED. Medically necessary 60 tablet 2    ferrous sulfate (FEOSOL) 325 mg (65 mg iron) Tab tablet Take 1 tablet (325 mg total) by mouth 2 (two) times daily. 60 tablet 3    fluticasone (FLONASE) 50 mcg/actuation nasal spray 1 spray (50 mcg total) by Each Nare route once daily. 1 Bottle 2    furosemide (LASIX) 40 MG tablet Take 0.5 tablets (20 mg total) by mouth once daily. 15 tablet 2    furosemide (LASIX) 40 MG tablet Take 0.5 tablets (20 mg total) by mouth once daily. 15 tablet 3    gabapentin (NEURONTIN) 800 MG tablet Take 1 tablet (800 mg total) by mouth 3 (three) times daily. 270 tablet 3    [START ON 9/16/2019] HYDROcodone-acetaminophen (NORCO)  mg per tablet Take 1 tablet by mouth every 8 (eight) hours as needed for Pain. Medical necessary 90 tablet 0    [START ON 9/16/2019] HYDROcodone-acetaminophen (NORCO)  mg per tablet Take 1 tablet by mouth every 8 (eight) hours as needed for Pain. Medical necessary 90 tablet 0    HYDROcodone-acetaminophen (NORCO)  mg per tablet Take 1 tablet by mouth every 8 (eight) hours as needed for Pain. Medically necessary 90 tablet 0    lancets Misc To check BG 3 times daily, to use with insurance preferred meter. 300 each 11    levalbuterol (XOPENEX) 0.63 mg/3 mL nebulizer solution INHALE THE CONTENTS OF 1 VIAL BY NEBULIZATION 4 times  DAILY.    DX: J43.9 792 mL 3    lisinopril (PRINIVIL,ZESTRIL) 20 MG tablet Take 1 tablet (20 mg total) by mouth once daily. 90 tablet 1    loratadine (CLARITIN) 10 mg  tablet Take 1 tablet (10 mg total) by mouth once daily.  0    metFORMIN (GLUCOPHAGE) 500 MG tablet TAKE 1 TABLET (500 MG TOTAL) BY MOUTH 2 (TWO) TIMES DAILY WITH MEALS. 180 tablet 3    multivitamin (THERAGRAN) tablet Take 1 tablet by mouth once daily.      nystatin (NYSTOP) powder Apply topically 2 (two) times daily. 120 g 11    nystatin-triamcinolone (MYCOLOG II) cream Apply topically 3 (three) times daily. 30 g 1    omega-3 acid ethyl esters (LOVAZA) 1 gram capsule 2 (two) times daily.       omeprazole (PRILOSEC) 40 MG capsule Take 1 capsule (40 mg total) by mouth once daily. 90 capsule 3    ondansetron (ZOFRAN-ODT) 8 MG TbDL Take 1 tablet (8 mg total) by mouth every 6 (six) hours as needed. 20 tablet 3    polyethylene glycol (GLYCOLAX) 17 gram/dose powder       potassium chloride SA (K-DUR,KLOR-CON) 20 MEQ tablet TAKE ONE TABLET BY MOUTH EVERY DAY 30 tablet 11    senna-docusate 8.6-50 mg (PERICOLACE) 8.6-50 mg per tablet Take 1 tablet by mouth 2 (two) times daily as needed for Constipation.      spironolactone (ALDACTONE) 25 MG tablet Take 1 tablet (25 mg total) by mouth twice a week. 8 tablet 11    traZODone (DESYREL) 100 MG tablet Take 1.5 tablets (150 mg total) by mouth every evening. 90 tablet 1    TRUE METRIX GLUCOSE TEST STRIP Strp TEST BLOOD SUGAR THREE TIMES A DAY  300 strip 3     No current facility-administered medications on file prior to visit.          Body comp:    UTO      Diet Education Discussed:    Breakfast:  Boiled egg  Lunch:  Turkey slices  Dinner:  Chicken or turkey- some vegetables  Still doing pork skins    Exercise/Activity Discussed:    Walking more    Behavior or Diet Goals for this patient:    Continue low carb low cely dieting  Exercise as much as possible    I think she will also need a panniculectomy at the time of surgery     Planning for hernia repair as she loses weight- will be major surgery     Will need cardiac clearance the closer we get to the surgery. We are  considering having surgery in November 15 if she can get closer to 200 pounds.     She is stopping the cigarettes.     1. Hernia of flank    2. Tobacco dependency          : I met with the patient for 15 minutes and counseled her for over 50% of that time

## 2019-08-23 ENCOUNTER — OUTPATIENT CASE MANAGEMENT (OUTPATIENT)
Dept: ADMINISTRATIVE | Facility: OTHER | Age: 69
End: 2019-08-23

## 2019-08-23 NOTE — LETTER
September 9, 2019           Dear Ms. Monahan,     Welcome to [facility] Complex [facility]. It was a pleasure talking with you today. My name is Carla Ma, JUANITA and I look forward to being your Nurse Care Manager. My goal is to help you function at the healthiest and highest level possible. You can contact me for any questions, needs or concerns directly at [phone].    As an [facility] patient with [employer], some of the services we may be able to provide include:      Development of an individualized care plan with a Registered Nurse    Connection with a    Connection with available resources and services     Coordinate communication among your care team members    Provide coaching and education    Help you understand your doctors treatment plan   Help you obtain information about your insurance coverage.     All services provided by [facility] Complex Care Managers and other care team members are coordinated with and communicated to your primary care team.    As part of your enrollment, you will be receiving education materials and more information about these services in your [facility] account, by phone or through the mail.  If you do not wish to participate or receive information, please contact our office at [phone].      Sincerely,        Carla Ma, RN  [facility]   Outpatient RN Complex Care Manager

## 2019-08-27 ENCOUNTER — TELEPHONE (OUTPATIENT)
Dept: HOME HEALTH SERVICES | Facility: HOSPITAL | Age: 69
End: 2019-08-27

## 2019-08-28 ENCOUNTER — TELEPHONE (OUTPATIENT)
Dept: HOME HEALTH SERVICES | Facility: HOSPITAL | Age: 69
End: 2019-08-28

## 2019-09-03 NOTE — TELEPHONE ENCOUNTER
Please let patient know she needs a six minute walk test to reorder her portable oxygen.  Please schedule this.  
Pt seen 4/25/19  [place]  checked with no evidence of diversion  Med doc 12/18  Tox screen 12/18  
Spoke to patient notified the medication was filled.   Offered to schedule the appointment for 6 min walk test patient states she will call back to schedule the appointment  
no
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
## 2019-09-10 NOTE — PATIENT INSTRUCTIONS
Low Blood Pressure (All Causes)  A blood pressure reading is made up of 2 numbers There is a top number over a bottom number. The top number is the systolic pressure. The bottom number is the diastolic pressure. A normal blood pressure is a systolic pressure less than 120 over a diastolic pressure less than 80. Low blood pressure (hypotension) is a blood pressure that is less than what is normal for you. Low blood pressure can cause dizziness, lightheadedness, or fainting.  Some medicines can cause low blood pressure. They include:  · High blood pressure pills  · Water pills (diuretics)  · Some heart medicines  · Some antidepressants  · Pain, anxiety, sedative, and sleeping medicines  Other causes include:  · Dehydration, severe infection, or fever  · Blood loss, such as bleeding from the stomach or intestines.  · Heart failure  · Change in heart rate or rhythm (arrhythmia)  · A drop in blood pressure from a sudden change in body position, from lying down to standing (orthostatic hypotension)  · Alcohol or drug intoxication  · Neurological diseases that impair the autonomic nervous system  Treatment will depend on what is causing your low blood pressure.  Home care  Follow these guidelines when caring for yourself at home:  · Rest until your symptoms get better.  · Keep a record of your symptoms and what you were doing when they occurred. Bring the record with you to your next appointment.  · Be aware of how quickly your blood pressure drops when you become dehydrated, spend a lot of time in the sun, or have low blood sugar. Take measures to prevent blood pressure drops at these times.  · Follow the treatment plan described by your healthcare provider.  Follow-up care  Follow up with your healthcare provider, or as advised.  When to seek medical advice  Call your healthcare provider right away if any of these occur:  · Dizziness, lightheadedness, or fainting  · Black or red color in your stools or  vomit  · Shortness of breath or difficulty breathing  · Chest, shoulder, arm, neck, or upper back pain  · Abdominal pain  · Diarrhea or vomiting that doesnt go away  · You arent able to eat or drink  · Fever of 100.4°F (38°C) or higher, or as directed by your healthcare provider  · Burning when you urinate  · Foul-smelling urine  Date Last Reviewed: 12/1/2016 © 2000-2017 [employer]. [address] [place] [address], [place] [address]. All rights reserved. This information is not intended as a substitute for professional medical care. Always follow your healthcare professional's instructions.        What is High Blood Pressure?  High blood pressure (also called hypertension) is known as the silent killer. This is because most of the time it doesnt cause symptoms. In fact, many people dont know they have it until other problems develop. In most cases, high blood pressure cant be cured. Its a disease that often requires lifelong treatment. The good news is that it can be managed.  Understanding blood pressure  The circulatory system is made up of the heart and blood vessels that carry blood through the body. Your heart is the pump for this system. With each heartbeat (contraction), the heart sends blood out through large blood vessels called arteries. Blood pressure is a measure of how hard the moving blood pushes against the walls of the arteries.  High blood pressure can harm your health  In a healthy blood vessel, the blood moves smoothly through the vessel and puts normal pressure on the vessel walls.       High blood pressure occurs when blood pushes too hard against artery walls. This causes damage to the artery walls and then the formation of scar tissue as it heals. This makes the arteries stiff and weak. Plaque sticks to the scarred tissue narrowing and hardening the arteries. High blood pressure also causes your heart to work harder to get blood out to the body. High blood pressure raises your  "risk of heart attack, also known as acute myocardial infarction, or AMI, and stroke. It can also lead to kidney disease, and blindness.  Measuring blood pressure  An example of a blood pressure measurement is 120/70 (120 over 70). The top number is the pressure of blood against the artery walls during a heartbeat (systolic). The bottom number is the pressure of blood against artery walls between heartbeats (diastolic). Talk with your healthcare provider to find out what your blood pressure goals should be.   Controlling blood pressure  If your blood pressure is too high, work with your doctor on a plan for lowering it. Below are steps you can take that will help lower your blood pressure.  · Choose heart-healthy foods. Eating healthier meals helps you control your blood pressure. Ask your doctor about the DASH eating plan. This plan helps reduce blood pressure by limiting the amount of sodium (salt) you have in your diet. DASH also encourages eating plenty of fruits and vegetables, low-fat or non-fat dairy, whole-grains, and foods high in fiber, and low in fat.  · Reduce sodium. Reducing sodium in your diet reduces fluid retention. Fluid retention caused by too much salt increases blood volume and blood pressure. The American Heart Associations (AHA) "ideal" sodium intake recommendation is 1,500 milligrams per day.  However, since American's eat so much salt, the AHA says a positive change can occur by cutting back to even 2,400 milligrams of sodium a day.  · Maintain a healthy weight. Being overweight makes you more likely to have high blood pressure. Losing excess weight helps lower blood pressure.  · Exercise regularly. Daily exercise helps your heart and blood vessels work better and stay healthier. It can help lower your blood pressure.  · Stop smoking. Smoking increases blood pressure and damages blood vessels.  · Limit alcohol. Drinking too much alcohol can raise blood pressure. Men should have no more than " 2 drinks a day. Women should have no more than 1. (A drink is equal to 1 beer, or a small glass of wine, or a shot of liquor.)  · Control stress. Stress makes your heart work harder and beat faster. Controlling stress helps you control your blood pressure.  Facts about high blood pressure  · Feeling OK does not mean that blood pressure is under control. Likewise, feeling bad doesnt mean its out of control. The only way to know for sure is to check your pressure regularly.  · Medicine is only one part of controlling high blood pressure. You also need to manage your weight, get regular exercise, and adjust your eating habits.  · High blood pressure is usually a lifelong problem. But it can be controlled with healthy lifestyle changes and medicine.  · Hypertension is not the same as stress. Although stress may be a factor in high blood pressure, its only one part of the story.  · Blood pressure medicines need to be taken every day. Stopping suddenly may cause a dangerous increase in pressure.   Date Last Reviewed: 4/27/2016 © 2000-2017 [employer]. [address] [place] [address]. All rights reserved. This information is not intended as a substitute for professional medical care. Always follow your healthcare professional's instructions.        Controlling High Blood Pressure  High blood pressure (hypertension) is often called the silent killer. This is because many people who have it dont know it. High blood pressure is defined as 140/90 mm Hg or higher. Know your blood pressure and remember to check it regularly. Doing so can save your life. Here are some things you can do to help control your blood pressure.    Choose heart-healthy foods  · Select low-salt, low-fat foods. Limit sodium intake to 2,400 mg per day or the amount suggested by your healthcare provider.  · Limit canned, dried, cured, packaged, and fast foods. These can contain a lot of salt.  · Eat 8 to 10 servings of fruits  and vegetables every day.  · Choose lean meats, fish, or chicken.  · Eat whole-grain pasta, brown rice, and beans.  · Eat 2 to 3 servings of low-fat or fat-free dairy products.  · Ask your doctor about the DASH eating plan. This plan helps reduce blood pressure.  · When you go to a restaurant, ask that your meal be prepared with no added salt.  Maintain a healthy weight  · Ask your healthcare provider how many calories to eat a day. Then stick to that number.  · Ask your healthcare provider what weight range is healthiest for you. If you are overweight, a weight loss of only 3% to 5% of your body weight can help lower blood pressure. Generally, a good weight loss goal is to lose 10% of your body weight in a year.  · Limit snacks and sweets.  · Get regular exercise.  Get up and get active  · Choose activities you enjoy. Find ones you can do with friends or family. This includes bicycling, dancing, walking, and jogging.  · Park farther away from building entrances.  · Use stairs instead of the elevator.  · When you can, walk or bike instead of driving.  · [place] leaves, garden, or do household repairs.  · Be active at a moderate to vigorous level of physical activity for at least 40 minutes for a minimum of 3 to 4 days a week.   Manage stress  · Make time to relax and enjoy life. Find time to laugh.  · Communicate your concerns with your loved ones and your healthcare provider.  · Visit with family and friends, and keep up with hobbies.  Limit alcohol and quit smoking  · Men should have no more than 2 drinks per day.  · Women should have no more than 1 drink per day.  · Talk with your healthcare provider about quitting smoking. Smoking significantly increases your risk for heart disease and stroke. Ask your healthcare provider about community smoking cessation programs and other options.  Medicines  If lifestyle changes arent enough, your healthcare provider may prescribe high blood pressure medicine. Take all  medicines as prescribed. If you have any questions about your medicines, ask your healthcare provider before stopping or changing them.   Date Last Reviewed: 4/27/2016 © 2000-2017 [employer]. [address] [place] [address], [place] [address]. All rights reserved. This information is not intended as a substitute for professional medical care. Always follow your healthcare professional's instructions.        Left- or Right- Side Congestive Heart Failure (CHF)    The heart is a large muscle. It is a pump that circulates blood throughout the body. Blood carries oxygen to all of the organs, including the brain, muscles, and skin. After your body takes the oxygen out of the blood, the blood returns to the heart. The right side of the heart collects the blood from the body and pumps it to the lungs. In the lungs, it gets fresh oxygen and gives up carbon dioxide. The oxygen-rich blood from the lungs then returns to the left side of the heart, where it is pumped back out to the rest of your body, starting the process all over.  Congestive heart failure (CHF) occurs when the heart muscle is weakened. This affects the pumping action of the heart. Heart failure can affect the right side of the heart or the left side. But heart failure may affect not only the right side of the heart or only the left side. Although it may have started on one side, it can and often eventually does affect both sides.  Right-side heart failure  When the right side of the heart is weakened, it cant handle the blood it is getting from the rest of the body. This blood returns to the heart through veins. When too much pressure builds up in the veins, fluid leaks out into the tissues. Gravity then causes that fluid to move to those parts of the body that are the lowest. So one of the first symptoms of right-side CHF can include swelling in the feet and ankles. If the condition gets worse, the swelling can even go up past the knees. Sometimes it  gets so severe, the liver can get congested as well.  Left-side heart failure  When the left side of the heart is weakened, it cant handle the blood it gets from the lungs. Pressure then builds up in the veins of the lungs, causing fluid to leak into the lung tissues. This may cause CHF and pulmonary edema. This causes you to feel short of breath, weak, or dizzy. These symptoms are often worse with exertion, such as when climbing stairs or walking up hills. Lying with your head flat is uncomfortable and can make your breathing worse. This may make sleeping difficult. You may need to use extra pillows to elevate your upper body to sleep well. The same is true when just resting during the daytime.  There are many causes of heart failure including:  · Coronary artery disease  · Past heart attack (also known as acute myocardial infarction, or AMI)  · High blood pressure  · Damaged heart valve  · Diabetes  · Obesity  · Cigarette smoking  · Alcohol abuse  Heart failure is a chronic condition. There is no cure. The purpose of medical treatment is to improve the pumping action of the heart. The main way to do this is to remove excess water from the body. A number of medicines can help reach this goal, improve symptoms, and prevent the heart from becoming weaker. Sometimes, heart failure can become so severe that a device is placed in the heart to help with pumping. Another major goal is to better treat the causes of heart failure, such as diabetes and high blood pressure, by making changes in your lifestyle and maximizing medical control when needed.  Home care  Follow these guidelines when caring for yourself at home:  · Check your weight every day. This is very important because a sudden increase in weight gain could mean worsening heart failure. Keep these things in mind:  ¨ Use the same scale every day.  ¨ Weigh yourself at the same time every day.  ¨ Make sure the scale is on a hard floor surface, not on a rug or  carpet.  ¨ Keep a record of your weight every day so your healthcare provider can see it. If you are not given a log sheet for this, keep a separate journal for this purpose.   · Cut back on the amount of salt (sodium) you eat. Follow your healthcare provider's recommendation on how much salt or sodium you should have each day.  ¨ Avoid high-salt foods. These include olives, pickles, smoked meats, salted potato chips, and most prepared foods.  ¨ Don't add salt to your food at the table. Use only small amounts of salt when cooking.  ¨ Read the labels carefully on food packages to learn how much salt or sodium is in each serving in the package. Remember, a can or package of food may contain more than 1 serving. So if you eat all the food in the package, you may be getting more salt than you think.  · Follow your healthcare provider's recommendations about how much fluid you should have. Be aware that some foods, such as soup, pudding, and juicy fruits like oranges or melons, contain liquid. You'll need to count the liquid in those foods as part of your daily fluid intake. Your provider can help you with this.  · Stop smoking.  · Cut back on how much alcohol you drink.  · Lose weight if you are overweight. The excess weight adds a lot of stress on the workload of the heart.  · Stay active. Talk with your provider about an exercise program that is safe for your heart.  · Keep your feet elevated to reduce swelling. Ask your provider about support hose as a preventive treatment for daytime leg swelling.  Besides taking your medicine as instructed, an important part of treatment is lifestyle changes. These include diet, physical activity, stopping smoking, and weight control.  Improve your diet by including more fresh foods, cutting back on how much sugar and saturated fat you eat, and eating fewer processed foods and less salt.  Follow-up care  Follow up with your healthcare provider, or as advised.  Make sure to keep any  appointments that were made for you. These can help better control your congestive heart failure. You will need to follow up with your provider on a routine basis to make sure your heart failure is well managed.  If an X-ray, ECG, or other tests were done, you will be told of any new findings that may affect your care.  Call 911  Call 911 if you:  · Become severely short of breath  · Feel lightheaded, or feel like you might pass out or faint  · Have chest pain or discomfort that is different than usual, the medicines your doctor told you to use for this don't help, or the pain lasts longer than 10 to 15 minutes  · You suddenly develop a rapid heart rate  When to seek medical advice  The following may be signs that your heart failure is getting worse. Call your healthcare provider right away if any of these happen:  · Sudden weight gain. This means 3 or more pounds in one day, or 5 or more pounds in 1 week.  · Trouble breathing not related to being active  · New or increased swelling of your legs or ankles  · Swelling or pain in your abdomen  · Breathing trouble at night. This means waking up short of breath or needing more pillows to breathe.  · Frequent coughing that doesnt go away  · Feeling much more tired than usual  Date Last Reviewed: 1/4/2016  © 7315-2958 [employer]. [address] [place] [address], [place] [address]. All rights reserved. This information is not intended as a substitute for professional medical care. Always follow your healthcare professional's instructions.        Heart Failure: Making Changes to Your Diet  You have a condition called heart failure. When you have heart failure, excess fluid is more likely to build up in your body because your heart isn't working well. This makes the heart work harder to pump blood. Fluid buildup causes symptoms such as shortness of breath and swelling (edema). This is often referred to as congestive heart failure or CHF. Controlling the amount of salt  (sodium) you eat may help stop fluid from building up. Your doctor may also tell you to reduce the amount of fluid you drink.  Reading food labels    Your healthcare provider will tell you how much sodium you can eat each day. Read food labels to keep track. Keep in mind that certain foods are high in salt. These include canned, frozen, and processed foods. Check the amount of sodium in each serving. Watch out for high-sodium ingredients. These include MSG (monosodium glutamate), baking soda, and sodium phosphate.   Eating less salt  Give yourself time to get used to eating less salt. It may take a little while. Here are some tips to help:  · Take the saltshaker off the table. Replace it with salt-free herb mixes and spices.  · Eat fresh or plain frozen vegetables. These have much less salt than canned vegetables.  · Choose low-sodium snacks like sodium-free pretzels, crackers, or air-popped popcorn.  · Dont add salt to your food when youre cooking. Instead, season your foods with pepper, lemon, garlic, or onion.  · When you eat out, ask that your food be cooked without added salt.  · Avoid eating fried foods as these often have a great deal of salt.  If youre told to limit fluids  You may need to limit how much fluid you have to help prevent swelling. This includes anything that is liquid at room temperature, such as ice cream and soup. If your doctor tells you to limit fluid, try these tips:  · Measure drinks in a measuring cup before you drink them. This will help you meet daily goals.  · Chill drinks to make them more refreshing.  · Suck on frozen lemon wedges to quench thirst.  · Only drink when youre thirsty.  · Chew sugarless gum or suck on hard candy to keep your mouth moist.  · Weigh yourself daily to know if your body's fluid content is rising.  My sodium goal  Your healthcare provider may give you a sodium goal to meet each day. This includes sodium found in food as well as salt that you add. My goal  is to eat no more than ___________ mg of sodium per day.     When to call your doctor  Call your doctor right away if you have any symptoms of worsening heart failure. These can include:  · Sudden weight gain  · Increased swelling of your legs or ankles  · Trouble breathing when youre resting or at night  · Increase in the number of pillows you have to sleep on  · Chest pain, pressure, discomfort, or pain in the jaw, neck, or back   Date Last Reviewed: 3/21/2016  © 1650-1425 [employer]. [address] [place] [id number]. All rights reserved. This information is not intended as a substitute for professional medical care. Always follow your healthcare professional's instructions.        Heart Failure: Tracking Your Weight  You have a condition called heart failure. When you have heart failure, a sudden weight gain or a steady rise in weight is a warning sign that your body is retaining too much water and salt. This could mean your heart failure is getting worse. If left untreated, it can cause problems for your lungs and result in shortness of breath. Weighing yourself each day is the best way to know if youre retaining water. If your weight goes up quickly, call your doctor. You will be given instructions on how to get rid of the excess water. You will likely need medicines and to avoid salt. This will help your heart work better.  Call your doctor if you gain more than 2 pounds in 1 day, more than 5 pounds in 1 week, or whatever weight gain you were told to report by your doctor. This is often a sign of worsening heart failure and needs to be evaluated and treated. Your doctor will tell you what to do next.   Tips for weighing yourself    · Weigh yourself at the same time each morning, wearing the same clothes. Weigh yourself after urinating and before eating.  · Use the same scale each day. Make sure the numbers are easy to read. Put the scale on a flat, hard surface -- not on a rug or  carpet.  · Do not stop weighing yourself. If you forget one day, weigh again the next morning.  How to use your weight chart  · Keep your weight chart near the scale. Write your weight on the chart as soon as you get off the scale.  · Fill in the month and the start date on the chart. Then write down your weight each day. Your chart will look like this:    · If you miss a day, leave the space blank. Weigh yourself the next day and write your weight in the next space.  · Take your weight chart with you when you go to see your doctor.  Date Last Reviewed: 3/20/2016  © 2896-8372 [employer]. [address] [place] [address], [place] [address]. All rights reserved. This information is not intended as a substitute for professional medical care. Always follow your healthcare professional's instructions.        Heart Failure: Warning Signs of a Flare-Up  You have a condition called heart failure. Once you have heart failure, flare-ups can happen. Below are signs that can mean your heart failure is getting worse. If you notice any of these warning signs, call your healthcare provider.  Swelling    · Your feet, ankles, or lower legs get puffier.  · You notice skin changes on your lower legs.  · Your shoes feel too tight.  · Your clothes are tighter in the waist.  · You have trouble getting rings on or off your fingers.  Shortness of breath  · You have to breathe harder even when youre doing your normal activities or when youre resting.  · You are short of breath walking up stairs or even short distances.  · You wake up at night short of breath or coughing.  · You need to use more pillows or sit up to sleep.  · You wake up tired or restless.  Other warning signs  · You feel weaker, dizzy, or more tired.  · You have chest pain or changes in your heartbeat.  · You have a cough that wont go away.  · You cant remember things or dont feel like eating.  Tracking your weight  Gaining weight is often the first warning sign  that heart failure is getting worse. Gaining even a few pounds can be a sign that your body is retaining excess water and salt. Weighing yourself each day in the morning after you urinate and before you eat, is the best way to know if you're retaining water. Get a scale that is easy to read and make sure you wear the same clothes and use the same scale every time you weigh. Your healthcare provider will show you how to track your weight. Call your doctor if you gain more than 2 pounds in 1 day, 5 pounds in 1 week, or whatever weight gain you were told to report by your doctor. This is often a sign of worsening heart failure and needs to be evaluated and treated before it compromises your breathing. Your doctor will tell you what to do next.    Date Last Reviewed: 3/15/2016  © 3140-5049 [employer]. [address] [place] [address]. All rights reserved. This information is not intended as a substitute for professional medical care. Always follow your healthcare professional's instructions.        Chlorhexidine Gluconate (CHG) Bathing to Prevent Infections  Chlorhexidine gluconate (CHG) is a cleaning product that kills germs. Daily bathing with CHG in the hospital helps keep infections from spreading. CHG bathing is especially helpful for patients in intensive care units (ICUs).  Increased risk for infection in the ICU  When you are in the ICU, you have a high risk of getting a new infection. You may be very ill. You may have more than one medical condition. That makes you more likely to get an infection. ICU patients are also likely to need treatments such as urinary catheters and ventilators. Things like these can increase the risk for infection. And many ICUs contain bacteria that may not respond to standard antibiotics. They can cause infections that are very hard to treat.  How CHG bathing helps  Many experts now recommend daily CHG bathing for all people receiving care in the ICU. Bathing  with CHG seems to be better at preventing infections than bathing with plain soap and water. Daily CHG bathing lowers your risk of getting an infection in the hospital. You are less likely to get sick from a germ that is very hard to treat. CHG bathing can help prevent infections from germs such as:  · Methicillin-resistant Staphylococcus aureus (MRSA)  · Vancomycin-resistant Enterococcus (VRE)  · Clostridium difficile (C diff)  · Infections from central venous catheters  · Infections at surgical sites  · Infections from ventilator use  Experts are less certain about the role of daily CHG bathing for patients outside the ICU. But many people are at risk of getting a new infection while in the hospital. If you are at high risk for infection, your health care provider may advise daily CHG bathing for you, even if you are not in the ICU.  In some cases, you might do CHG bathing at home. For example, your health care provider may tell you to use a CHG skin cleanser before you have surgery. This can reduce the chance for infection. In some health care centers, patients who have MRSA need to bathe with CHG before elective surgery.  CHG bathing is not a substitute for other ways of controlling infection in the hospital. It also cannot prevent infection all of the time. But regular CHG bathing may reduce the risk that you will get a new infection. That may shorten your hospital stay and make it more likely for you to get well.  Risks of CHG bathing  Possible side effects include:  · Skin rash (usually mild)  · Allergic reaction  · Skin dryness  Your risks may differ, depending on your age, your overall health, and other factors. CHG bathing might not be right for you if you have serious skin problems, irritation, or burns. Talk with your health care provider about all your concerns.  CHG bathing may pose another long-term risk to health care facilities. Over time, heavy use of CHG bathing in ICUs may help bacteria grow that  are resistant to the treatment.  During CHG bathing  You will probably receive one of these baths per day. You can ask your nursing staff when you can normally expect this bath. Methods of CHG bathing may differ somewhat from hospital to hospital. Your health care team can let you know what to expect. As an example, you might expect the following:  · Your nurse will help you remove your clothes and any medical attachments (such as ECG leads), if possible.  · Your nurse will wash his or her hands and put on new gloves.  · If this is your first bath, the nurse may give you a sponge bath with soap and water to clean you thoroughly. When you are dry, the CHG bath will begin.  · The nurse may use special washcloths that already contain CHG. Your nurse will use these washcloths to massage you all over your body, except your face. It should feel much like a normal sponge bath. Your nurse will clean your face with plain soap and water afterward.  Many hospitals now avoid using a basin to give these baths. Basins may become contaminated with germs.  After CHG bathing  You will air dry for a few minutes after having your bath. Your nurse will apply any skin lotion as needed. He or she will help you put your clothes back on. Any medical attachments that have been removed will be reattached.  Let your health care provider know right away if the bath feels uncomfortable, or if you get dry skin or a rash. You may need to use extra lotion after your CHG bath. In rare cases some people have an allergic reaction. If that happens, you may need to stop CHG bathing.  Date Last Reviewed: 3/19/2015  © 1572-7922 [employer]. [address] [place] [address]. All rights reserved. This information is not intended as a substitute for professional medical care. Always follow your healthcare professional's instructions.        Postsurgery Checklist      Follow-up care is an important part of a successful surgery. Your  surgeon needs to see that youre healing and recovering well. Ask when to return for your first follow-up visit.     After surgery, there are steps you can take to help with your recovery. You may have been admitted as an inpatient (staying in the hospital overnight) or an outpatient (going home the day of surgery). While in the hospital it is important to follow instructions from your surgeon, nurse, and other healthcare providers. This will help you be able to go home as soon as possible. Talk with the healthcare providers before leaving the hospital about help you may need at home. Once you are home, continue to rest, but also try to slowly get back to your normal activities.   Call your surgeon if you have any questions about your recovery.  In the hospital  Managing pain  · Youll get medicine to control pain. You may be given medicine by mouth (oral), by injection, or into your vein (intravenous or IV). Or you may have PCA (patient-controlled analgesia). PCA allows you to control your own pain medicine. Follow your healthcare providers instructions on how to use the PCA.  · Tell your nurse if the medicines dont help control your pain or if the pain gets worse.  Preventing deep vein thrombosis (DVT)  This is a condition in which a blood clot forms in a deep vein, often in the leg. Part of the clot may break loose and travel to the lungs. This is called a pulmonary embolism. It is a serious condition and may cause death. People who have surgery have an increased chance of developing blood clots. So to prevent clots, your surgeon will prescribe one or more of the following:  · Anticoagulant medicine. This is a blood thinner that helps to prevent blood clots. It may be given to you before or right after surgery.   · Compression stockings. These are elastic stockings that fit tightly around your legs. This helps keep blood flowing toward the heart, so that it is less likely to pool in the legs and cause a blood  clot.  · Getting out of bed and walking (ambulation). As soon as youre able, youll be helped out of bed by your nurse. Moving around improves blood flow and helps prevent blood clots.  · Exercises. Simple exercises while in bed or sitting in a chair can help prevent blood clots. Move your feet in a [place] or up and down 10 to 30 times an hour to improve blood flow.  · Sequential compression device (SCD) or intermittent pneumatic compression (IPC). These are special sleeves that are placed around the legs. They are connected to an electric pump. The pump inflates and deflates the sleeves. As the sleeves inflate, they gently squeeze the leg or foot muscles. This helps with blood flow and helps prevent blood clots. Remove the sleeves so that you do not trip or fall when you are walking, like when you use the bathroom or shower. If you need help removing the cuffs, ask the nurse or aid.  Nausea and vomiting  This is common after surgery. If you are having problems with nausea and vomiting, be sure to tell your nurse or surgeon.  Preventing pneumonia  To help clear your lungs and prevent pneumonia, you may be taught deep-breathing and coughing exercises. Follow your healthcare providers instructions on how to do the exercises and how often to do them.  Incision care  The nurse will change the dressing over your incision. To help prevent germs from getting into the incision, avoid touching it. Before you go home, your nurse will teach you or a family member how to take care of your incision at home. Make sure you understand the instructions. Nurses may also help you with your incision care and other needs once you are home. The hospital nurses and other staff will help you set up care you may need after you leave the hospital.  Blood tests  After surgery, youll likely have blood drawn. This is to make sure youre healing well and arent developing problems, like infection, anemia, or high blood sugar  (hyperglycemia).  Urinary catheter  During surgery, a tube (catheter) may have been inserted into your bladder to drain urine. Your surgeon will determine when the catheter can be removed. If you go home with the catheter in place, the nurse will teach you how to take care of the catheter at home. Be sure to ask any questions about catheter care before you leave the hospital. You may also get help from family members or home health nurses.  At home  Here is what you can do at home:  · Pain management. Take your pain medicines as directed, before the pain becomes severe. Dont take medicine more often than prescribed. Overdose is dangerous. Also dont take medicine less often than prescribed. This makes the medicine less effective. Taking pain medicine at night can help you sleep.  · Preventing blood clots and pneumonia. DVT and pneumonia can still occur even after you go home. Follow the discharge instructions you were given in the hospital. Some things you can do to prevent DVT may include continuing to wear compression stockings, using a sequential compression device (SCD) or intermittent pneumatic compression (IPC), or taking medicine. Make sure you removed the sleeves when you are up and walking. Some things you can do to prevent pneumonia may include keeping up with the breathing and coughing exercises you learned in the hospital.  · Incision care. Follow instructions you received in the hospital about when its safe to shower. Until then, keep the incision dry. Watch for signs of infection. Signs include increased redness, swelling, drainage from the incision, or a fever of 100.4°F (38°C) or higher, or as directed by your healthcare provider.   · Activity. At your follow-up visit, ask your surgeon when its safe to return to your regular activities. Slowly get back to exercise and other activities.  · Returning to work. Going back to work depends on your surgery and the type of job you do. You and your surgeon  will decide when you can return to work. Its often 4 to 6 weeks after major surgery, and a few days after minor surgery.   · Driving. Do not drive until your surgeon tells you that you can.   · Get good nutrition. Healthy eating helps your body heal. If you had a special diet before surgery, ask your surgeon if you should stay on the same diet.  Follow-up appointment                                My follow-up appointment is scheduled for:      __________________________________________________________     When to seek medical advice  Call 911 if you have the following symptoms that mean a blood clot in the lungs:  · Chest pain  · Trouble breathing  · Fast heartbeat  · Sweating  · Coughing (may cough up blood)  · Fainting  Also call 911 if you have heavy or uncontrolled bleeding.  Otherwise, call your surgeon right away if you have any of the following:  · Pain, swelling, and redness in your leg or arm. These symptoms may mean a blood clot.  · Increased pain or pain that is not relieved with medicines  · Drainage, redness, or swelling around the incision  · Incision opens up (cover it with a clean dressing or cloth)  · Severe nausea and vomiting  · Fever of 100.4°F (38°C) or higher, or as directed by your healthcare provider   Date Last Reviewed: 12/1/2016  © 8219-1714 [employer]". [address] [place] [address]. All rights reserved. This information is not intended as a substitute for professional medical care. Always follow your healthcare professional's instructions.        Preventing Surgical Site Infections     Hand washing reduces the risk of infection.     One risk of having surgery is an infection at the surgical site. The surgical site is any cut the surgeon makes in the skin to do the operation. Surgical site infections can range from minor to severe or even fatal. This sheet tells you more about surgical site infections, what hospitals are doing to prevent them, and how they are  treated if they do occur. It also tells you what you can do to prevent these infections.  What causes surgical site infections?  Germs are everywhere. Theyre on your skin, in the air, and on things you touch. Many germs are good. Some are harmful. Surgical site infections occur when harmful germs enter your body through the incision in your skin. Some infections are caused by germs that are in the air or on objects. But most are caused by germs found on and in your own body.  Who is at risk for surgical site infections?  Anyone can have a surgical site infection. Your risk is greater if you:  · Are an older adult  · Have a weak immune system or other health conditions or illnesses such as diabetes  · Take certain medicines such as steroids  · Are a smoker  · Have certain types of operations, such as abdominal surgery  · Have poor nutrition  · Are very overweight  · If the operation lasts longer than 2 hours  What are the symptoms of a surgical site infection?  · The infection usually starts with increased skin redness, pain, and swelling around the incision. Later you may notice a cloudy or greenish-yellow discharge from the incision and it may develop a foul odor. The incision may separate or open up. You are also likely to have a fever and may feel very ill.  · Symptoms can appear any time from hours to weeks after surgery. Implants such as an artificial knee or hip can become infected at any time after the operation.  How are surgical site infections treated?  · Surgical site infections are treated with antibiotics. The type of medicine you get will depend on the germ thought to be causing the infection. Most serious wound infections need local wound care, and in some cases, further surgery.  · An infected skin wound may be reopened and cleaned. Sometimes, deep wounds need to be packed with gauze that is changed often until the wound starts to heal from the inside out. Your healthcare provider will figure out  the best care needed to treat your surgical site infection.  · If an infection occurs where an implant is placed, the implant may be removed.  · If you have an infection deeper in your body, you may need another operation to treat it.  What hospitals do to prevent surgical site infections  Many hospitals take these steps to help prevent surgical site infections:  · Handwashing. Before the operation, your surgeon and all operating room staff scrub their hands and arms with an antiseptic soap.  · Clean skin. The site where your incision is made is carefully cleaned with an antiseptic solution.  · Sterile clothing and drapes. Members of your surgical team wear medical uniforms (scrub suits), long-sleeved surgical gowns, masks, caps, shoe covers, and sterile gloves. Your body is fully covered with a large sterile sheet (sterile drape) except for the spot where the incision is made.  · Clean air. Operating rooms have special air filters and positive pressure airflow to prevent unfiltered air from entering the room.  · Careful use of antibiotics. Antibiotics are given no more than 60 minutes before the incision is made and stopped within 24 hours after surgery. This helps kill germs but avoids problems that can occur when antibiotics are taken longer.  · Controlled blood sugar levels. Your blood sugar level may rise because of the stress of the surgery. Your blood sugar level is watched closely to make sure it stays within a normal range. High blood sugar delays wound healing and increases the chances for infection.  · Controlled body temperature. A lower-than-normal temperature during or after surgery prevents oxygen from reaching the wound and makes it harder for your body to fight infection. Hospitals may warm IV fluids, increase the temperature in the operating room, and provide warm-air blankets.  · Proper hair removal. Any hair that must be removed is clipped right before the incision, not shaved with a razor. This  prevents tiny nicks and cuts through which germs can enter.  · Wound care. After surgery, a closed wound is covered with a sterile dressing for a day or two. Open wounds are packed with sterile gauze and covered with a sterile dressing.  What you can do to prevent surgical site infections  · Ask questions. Learn what your hospital is doing to prevent infection.  · If your doctor tells you to, shower or bathe with plain soap the night before and the day of your operation. Follow the instructions you are given. You may be asked to use a special cleanser that you dont rinse off.  · If you smoke, stop for the longest duration possible before and after the operation. Ask your doctor about ways to quit.  · Take antibiotics only when your healthcare provider tells you to. Using antibiotics when theyre not needed can create germs that are harder to kill. Also, finish all your antibiotics, even if you feel better.  · Be sure healthcare workers clean their hands with plain soap and water or with an alcohol-based hand  before and after caring for you. Dont be afraid to remind them.  · After surgery, eat healthy foods. Care for your incision as directed by your doctor or nurse.     When to seek medical care  Call your healthcare provider if you have any of the following:  · Increased soreness, pain, or tenderness at the surgical site  · A red streak, increased redness, or puffiness near the incision  · Yellowish, cloudy, or bad-smelling discharge from the incision  · Stitches that dissolve before the wound heals  · Fever of 100.4°F (38°C) or higher, or as directed by your healthcare provider  · A tired feeling that doesnt go away   Date Last Reviewed: 12/1/2016  © 8369-1011 [employer]. [address] [place] [address], [place] [address]. All rights reserved. This information is not intended as a substitute for professional medical care. Always follow your healthcare professional's  instructions.        Recognizing and Treating Wound Infection  Wounds can become infected with harmful germs (bacteria). This prevents healing. It also increases your risk of scars. In some cases, the infection may spread to other parts of your body. And infection with the bacteria that cause tetanus can be fatal. Know what to look for and get prompt treatment for infection.    Risk factors  A wound is more likely to become infected if it:  · Results from a hole (puncture), such as from a nail or piece of glass  · Results from a human or animal bite  · Isn't cleaned or treated within 8 hours  · Occurs in your hand, foot, leg, armpit, or groin (the area where your belly meets your thighs)  · Contains dirt or saliva  · Heals very slowly  · Occurs in a person with diabetes, alcoholism, or a compromised immune system    Symptoms of infection  Call your healthcare provider at the first sign of infection, such as:  · Yellow, yellow-green, or foul-smelling drainage from a wound  · More pain, swelling, or redness in or near a wound  · A change in the color or size of a wound  · Red streaks in the skin around the wound  · Fever  Treatment  Treatment is likely to depend on the type of infection you have, and how serious it is. Your healthcare provider may prescribe oral antibiotics to help fight bacteria. Your provider may also flush the wound with an antibiotic solution or apply an antibiotic ointment. Sometimes a pocket of pus (abscess) may form. In that case, the abscess will be opened and the fluid drained. You may need hospital care if the infection is very severe.  Preventing wound infection  Follow these steps to help keep wounds from getting infected:  · Wash the wound right away with soap and water.  · Apply a small amount of antibiotic ointment. You can buy this without a prescription.  · Cover wounds with a bandage or gauze dressing. Change daily.  · Keep the wound clean and dry for the first 24 hours.  · Change  the dressing daily using sterile gloves.   Date Last Reviewed: 8/13/2015  © 3370-6372 [employer]. [address] [place] [address], [place] [address]. All rights reserved. This information is not intended as a substitute for professional medical care. Always follow your healthcare professional's instructions.        Using Oxygen Safely  Using prescribed oxygen can help you avoid shortness of breath, improve sleep, and mental alertness, and help you to be more active. To reduce the chances of fire and other hazards, you need to follow important safety guidelines when using your oxygen unit. Remember these Dos and Donts:    Oxygen DOs  Suggestions of what to do include the following:   · Do keep sources of flame at least 5 feet away from where your oxygen unit is being used or stored. This includes cigarettes, matches, candles, fireplaces, gas burners, pipes, or anything else that could start a fire. Never smoke or use an open flame when wearing oxygen.  · Do keep the oxygen unit at least 5 feet away from sources of heat such as space heaters, electric or gas heaters, steam pipes, furnaces, and radiators.  · Do ask the medical equipment company if you should keep the oxygen unit away from other appliances, such as TVs and radios.  · Do turn off the oxygen unit completely when its not in use.  · Do have a fire extinguisher nearby. Make sure you and others in your household know how to use it.  · Do be careful not to trip over the oxygen tubing.  Oxygen DONTs  Suggestions of what not to do include the following:   · Dont smoke, and dont allow others to smoke near you. Post a No Smoking sign in your home.  · Dont use aerosol sprays such as air fresheners or hairspray near the oxygen unit. Aerosols are very flammable.  · Dont use vapor rubs, petroleum jelly, or oil-based hand lotion. These are flammable. Use water-based products instead.  · Dont use oxygen while cooking with gas. Ask the medical equipment  company about other types of cooking.  · Dont oil the oxygen unit. And dont use it with oily or greasy hands.  · Dont place a liquid oxygen unit on its side. The oxygen inside can evaporate. Oxygen should always be stored upright in a secured device.  Date Last Reviewed: 5/1/2016  © 3718-8509 [employer]. [address] [place] [address], [place] [id number]. All rights reserved. This information is not intended as a substitute for professional medical care. Always follow your healthcare professional's instructions.

## 2019-09-12 ENCOUNTER — TELEPHONE (OUTPATIENT)
Dept: FAMILY MEDICINE | Facility: CLINIC | Age: 69
End: 2019-09-12

## 2019-09-12 RX ORDER — DULOXETIN HYDROCHLORIDE 30 MG/1
CAPSULE, DELAYED RELEASE ORAL
Qty: 90 CAPSULE | Refills: 11 | Status: SHIPPED | OUTPATIENT
Start: 2019-09-12 | End: 2020-03-10 | Stop reason: ALTCHOICE

## 2019-09-12 NOTE — TELEPHONE ENCOUNTER
----- Message from Misty Lovett sent at 9/12/2019  8:08 AM CDT -----  Contact: Daughter  Patients daughter came in this morning and dropped off Mrs. Arizmendi blood pressure log for Dr. Bird.

## 2019-09-12 NOTE — TELEPHONE ENCOUNTER
Patient submitted blood pressure/sugar logs for review. Logs placed on Dr. Bird's desk. Please advise.

## 2019-09-16 DIAGNOSIS — M51.36 LUMBAR DEGENERATIVE DISC DISEASE: ICD-10-CM

## 2019-09-16 DIAGNOSIS — M43.12 SPONDYLOLISTHESIS OF CERVICAL REGION: ICD-10-CM

## 2019-09-16 RX ORDER — HYDROCODONE BITARTRATE AND ACETAMINOPHEN 10; 325 MG/1; MG/1
1 TABLET ORAL EVERY 8 HOURS PRN
Qty: 90 TABLET | Refills: 0 | Status: SHIPPED | OUTPATIENT
Start: 2019-09-16 | End: 2019-10-15 | Stop reason: SDUPTHER

## 2019-09-16 NOTE — TELEPHONE ENCOUNTER
Normal BP logs.The current medical regimen is effective;  continue present plan and medications.

## 2019-09-16 NOTE — TELEPHONE ENCOUNTER
Please notify Ms Jaylan. The combination narcotic medications are not recommended. Klonopin or hydrocodone should be taper off.

## 2019-09-20 ENCOUNTER — PATIENT OUTREACH (OUTPATIENT)
Dept: ADMINISTRATIVE | Facility: HOSPITAL | Age: 69
End: 2019-09-20

## 2019-09-20 ENCOUNTER — OFFICE VISIT (OUTPATIENT)
Dept: PLASTIC SURGERY | Facility: CLINIC | Age: 69
End: 2019-09-20
Payer: MEDICARE

## 2019-09-20 VITALS
WEIGHT: 239.19 LBS | BODY MASS INDEX: 36.37 KG/M2 | HEART RATE: 55 BPM | DIASTOLIC BLOOD PRESSURE: 54 MMHG | SYSTOLIC BLOOD PRESSURE: 111 MMHG

## 2019-09-20 DIAGNOSIS — E65 PANNUS, ABDOMINAL: Primary | ICD-10-CM

## 2019-09-20 PROCEDURE — 99203 PR OFFICE/OUTPT VISIT, NEW, LEVL III, 30-44 MIN: ICD-10-PCS | Mod: HCNC,S$GLB,, | Performed by: SURGERY

## 2019-09-20 PROCEDURE — 3078F PR MOST RECENT DIASTOLIC BLOOD PRESSURE < 80 MM HG: ICD-10-PCS | Mod: HCNC,CPTII,S$GLB, | Performed by: SURGERY

## 2019-09-20 PROCEDURE — 99999 PR PBB SHADOW E&M-EST. PATIENT-LVL III: ICD-10-PCS | Mod: PBBFAC,HCNC,, | Performed by: SURGERY

## 2019-09-20 PROCEDURE — 3078F DIAST BP <80 MM HG: CPT | Mod: HCNC,CPTII,S$GLB, | Performed by: SURGERY

## 2019-09-20 PROCEDURE — 1101F PT FALLS ASSESS-DOCD LE1/YR: CPT | Mod: HCNC,CPTII,S$GLB, | Performed by: SURGERY

## 2019-09-20 PROCEDURE — 3074F SYST BP LT 130 MM HG: CPT | Mod: HCNC,CPTII,S$GLB, | Performed by: SURGERY

## 2019-09-20 PROCEDURE — 99203 OFFICE O/P NEW LOW 30 MIN: CPT | Mod: HCNC,S$GLB,, | Performed by: SURGERY

## 2019-09-20 PROCEDURE — 99999 PR PBB SHADOW E&M-EST. PATIENT-LVL III: CPT | Mod: PBBFAC,HCNC,, | Performed by: SURGERY

## 2019-09-20 PROCEDURE — 1101F PR PT FALLS ASSESS DOC 0-1 FALLS W/OUT INJ PAST YR: ICD-10-PCS | Mod: HCNC,CPTII,S$GLB, | Performed by: SURGERY

## 2019-09-20 PROCEDURE — 3074F PR MOST RECENT SYSTOLIC BLOOD PRESSURE < 130 MM HG: ICD-10-PCS | Mod: HCNC,CPTII,S$GLB, | Performed by: SURGERY

## 2019-09-20 NOTE — PROGRESS NOTES
Nelly Tian is a referral from Dr. Lucero with a massive left-sided   abdominal wall hernia.  The consult is in regards to a panniculectomy in   conjunction with the hernia repair.  Evaluation shows that she indeed has a   massive hernia with loss of domain.  She has ample skin for closure.  This will   be a relatively straightforward closure for us.  She has so much redundant skin   that this can be closed relatively easily, but the difficulty obviously will be   in the hernia repair itself.  The majority of the skin overlying the hernia   repair will not be necessary for the closure, but this obviously does not make   the job of removing the hernia from the posterior aspect of that skin.  I will   go ahead and discuss this with Dr. Lucero.  The patient is very nervous and   very confused on how to proceed.      CHRISTIE/AMANDA  dd: 09/20/2019 12:50:34 (CDT)  td: 09/21/2019 09:12:46 (CDT)  Doc ID   #[id number]  Job ID #[id number]    CC:

## 2019-09-22 DIAGNOSIS — J41.8 MIXED SIMPLE AND MUCOPURULENT CHRONIC BRONCHITIS: ICD-10-CM

## 2019-09-23 ENCOUNTER — TELEPHONE (OUTPATIENT)
Dept: HOME HEALTH SERVICES | Facility: HOSPITAL | Age: 69
End: 2019-09-23

## 2019-09-23 RX ORDER — FLUTICASONE FUROATE AND VILANTEROL TRIFENATATE 100; 25 UG/1; UG/1
POWDER RESPIRATORY (INHALATION)
Qty: 180 EACH | Refills: 2 | Status: SHIPPED | OUTPATIENT
Start: 2019-09-23 | End: 2020-02-18

## 2019-09-24 ENCOUNTER — OFFICE VISIT (OUTPATIENT)
Dept: SURGERY | Facility: CLINIC | Age: 69
End: 2019-09-24
Payer: MEDICARE

## 2019-09-24 VITALS
DIASTOLIC BLOOD PRESSURE: 59 MMHG | RESPIRATION RATE: 16 BRPM | BODY MASS INDEX: 35.94 KG/M2 | SYSTOLIC BLOOD PRESSURE: 139 MMHG | WEIGHT: 237.13 LBS | TEMPERATURE: 98 F | HEART RATE: 76 BPM | HEIGHT: 68 IN

## 2019-09-24 DIAGNOSIS — K45.8 FLANK HERNIA: Primary | ICD-10-CM

## 2019-09-24 PROCEDURE — 99213 OFFICE O/P EST LOW 20 MIN: CPT | Mod: HCNC,S$GLB,, | Performed by: SURGERY

## 2019-09-24 PROCEDURE — 99999 PR PBB SHADOW E&M-EST. PATIENT-LVL III: ICD-10-PCS | Mod: PBBFAC,HCNC,, | Performed by: SURGERY

## 2019-09-24 PROCEDURE — 3078F PR MOST RECENT DIASTOLIC BLOOD PRESSURE < 80 MM HG: ICD-10-PCS | Mod: HCNC,CPTII,S$GLB, | Performed by: SURGERY

## 2019-09-24 PROCEDURE — 1101F PT FALLS ASSESS-DOCD LE1/YR: CPT | Mod: HCNC,CPTII,S$GLB, | Performed by: SURGERY

## 2019-09-24 PROCEDURE — 99999 PR PBB SHADOW E&M-EST. PATIENT-LVL III: CPT | Mod: PBBFAC,HCNC,, | Performed by: SURGERY

## 2019-09-24 PROCEDURE — 1101F PR PT FALLS ASSESS DOC 0-1 FALLS W/OUT INJ PAST YR: ICD-10-PCS | Mod: HCNC,CPTII,S$GLB, | Performed by: SURGERY

## 2019-09-24 PROCEDURE — 3075F SYST BP GE 130 - 139MM HG: CPT | Mod: HCNC,CPTII,S$GLB, | Performed by: SURGERY

## 2019-09-24 PROCEDURE — 3075F PR MOST RECENT SYSTOLIC BLOOD PRESS GE 130-139MM HG: ICD-10-PCS | Mod: HCNC,CPTII,S$GLB, | Performed by: SURGERY

## 2019-09-24 PROCEDURE — 99213 PR OFFICE/OUTPT VISIT, EST, LEVL III, 20-29 MIN: ICD-10-PCS | Mod: HCNC,S$GLB,, | Performed by: SURGERY

## 2019-09-24 PROCEDURE — 3078F DIAST BP <80 MM HG: CPT | Mod: HCNC,CPTII,S$GLB, | Performed by: SURGERY

## 2019-09-24 RX ORDER — CEFADROXIL 500 MG/1
500 CAPSULE ORAL DAILY
Qty: 30 CAPSULE | Refills: 0 | Status: SHIPPED | OUTPATIENT
Start: 2019-09-24 | End: 2019-10-24

## 2019-09-24 NOTE — PROGRESS NOTES
Medically Supervised Weight Loss Documentation    Date of Visit: 09/24/2019    Patient: Nelly Tian    Current Weight:237  Current BMI: Body mass index is 36.05 kg/m².  Weight Change: - 14 pounds    Last Weight: 247    Beginning Weight: 251      Vitals:   Vitals:    09/24/19 0935   BP: (!) 139/59   Pulse: 76   Resp: 16   Temp: 97.6 °F (36.4 °C)       Comorbidities:   Past Medical History:   Diagnosis Date    *Atrial flutter     Angina pectoris 9/18/2017    Anxiety     Arthritis     Asthma     Atrial fibrillation     Back pain     Cataract     OD    CHF (congestive heart failure)     COPD (chronic obstructive pulmonary disease)     Depression     Diabetes mellitus     Emphysema of lung     Heart failure     Hepatomegaly 2/3/2016    Hernia     History of MI (myocardial infarction) 1/19/2016    Hypercapnic respiratory failure, chronic 11/16/2016    Hyperlipidemia     Hypertension     Iron deficiency anemia 2/3/2016    Myocardial infarction     Obesity     Peripheral vascular disease     Pneumonia     Polyneuropathy     Retinal detachment     OS    Septic shock 4/23/2017    Skin ulcer 3/18/2017    Tobacco dependence     Type II 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, not stated as uncontrolled(250.60)        Medications:  Current Outpatient Medications on File Prior to Visit   Medication Sig Dispense Refill    albuterol (ACCUNEB) 0.63 mg/3 mL Nebu Take 3 mLs (0.63 mg total) by nebulization every 6 (six) hours as needed. Rescue 75 mL 11    albuterol (PROVENTIL/VENTOLIN HFA) 90 mcg/actuation inhaler INHALE 2 PUFFS INTO THE LUNGS EVERY 6 (SIX) HOURS AS NEEDED FOR RESCUE 54 g 3    apixaban (ELIQUIS) 5 mg Tab Take 1 tablet (5 mg total) by mouth 2 (two) times daily. 180 tablet 3    ascorbic acid, vitamin C, (VITAMIN C) 500 MG tablet Take 2 tablets (1,000 mg total) by mouth every evening.      atorvastatin (LIPITOR) 20 MG tablet Take 1 tablet (20 mg  total) by mouth once daily. 90 tablet 3    blood-glucose meter (TRUE METRIX AIR GLUCOSE METER) kit AC meals. Insulin dependent.Dispence strips and lancets 3 months. 1 each 0    BREO ELLIPTA 100-25 mcg/dose diskus inhaler INHALE 1 PUFF INTO THE LUNGS ONCE DAILY. 180 each 2    clonazePAM (KLONOPIN) 0.5 MG tablet Take 1 tablet (0.5 mg total) by mouth 2 (two) times daily. TAKE ONE TABLET BY MOUTH TWO TIMES A DAY AS NEEDED. Medically necessary 60 tablet 2    DULoxetine (CYMBALTA) 30 MG capsule TAKE 1 CAPSULE ONCE DAILY 90 capsule 11    ferrous sulfate (FEOSOL) 325 mg (65 mg iron) Tab tablet Take 1 tablet (325 mg total) by mouth 2 (two) times daily. 60 tablet 3    fluticasone (FLONASE) 50 mcg/actuation nasal spray 1 spray (50 mcg total) by Each Nare route once daily. 1 Bottle 2    furosemide (LASIX) 40 MG tablet Take 0.5 tablets (20 mg total) by mouth once daily. 15 tablet 2    furosemide (LASIX) 40 MG tablet Take 0.5 tablets (20 mg total) by mouth once daily. 15 tablet 3    gabapentin (NEURONTIN) 800 MG tablet Take 1 tablet (800 mg total) by mouth 3 (three) times daily. 270 tablet 3    HYDROcodone-acetaminophen (NORCO)  mg per tablet Take 1 tablet by mouth every 8 (eight) hours as needed for Pain. Medical necessary 90 tablet 0    levalbuterol (XOPENEX) 0.63 mg/3 mL nebulizer solution INHALE THE CONTENTS OF 1 VIAL BY NEBULIZATION 4 times  DAILY.    DX: J43.9 792 mL 3    lisinopril (PRINIVIL,ZESTRIL) 20 MG tablet Take 1 tablet (20 mg total) by mouth once daily. 90 tablet 1    loratadine (CLARITIN) 10 mg tablet Take 1 tablet (10 mg total) by mouth once daily.  0    metFORMIN (GLUCOPHAGE) 500 MG tablet TAKE 1 TABLET (500 MG TOTAL) BY MOUTH 2 (TWO) TIMES DAILY WITH MEALS. 180 tablet 3    multivitamin (THERAGRAN) tablet Take 1 tablet by mouth once daily.      nystatin (NYSTOP) powder Apply topically 2 (two) times daily. 120 g 11    nystatin-triamcinolone (MYCOLOG II) cream Apply topically 3 (three) times  daily. 30 g 1    omega-3 acid ethyl esters (LOVAZA) 1 gram capsule 2 (two) times daily.       omeprazole (PRILOSEC) 40 MG capsule Take 1 capsule (40 mg total) by mouth once daily. 90 capsule 3    ondansetron (ZOFRAN-ODT) 8 MG TbDL Take 1 tablet (8 mg total) by mouth every 6 (six) hours as needed. 20 tablet 3    polyethylene glycol (GLYCOLAX) 17 gram/dose powder       potassium chloride SA (K-DUR,KLOR-CON) 20 MEQ tablet TAKE ONE TABLET BY MOUTH EVERY DAY 30 tablet 11    senna-docusate 8.6-50 mg (PERICOLACE) 8.6-50 mg per tablet Take 1 tablet by mouth 2 (two) times daily as needed for Constipation.      spironolactone (ALDACTONE) 25 MG tablet Take 1 tablet (25 mg total) by mouth twice a week. 8 tablet 11    traZODone (DESYREL) 100 MG tablet Take 1.5 tablets (150 mg total) by mouth every evening. 90 tablet 1    TRUE METRIX GLUCOSE TEST STRIP Strp TEST BLOOD SUGAR THREE TIMES A DAY  300 strip 3    [DISCONTINUED] cefadroxil (DURICEF) 500 MG Cap   3    HYDROcodone-acetaminophen (NORCO)  mg per tablet Take 1 tablet by mouth every 8 (eight) hours as needed for Pain. Medical necessary (Patient not taking: Reported on 9/24/2019) 90 tablet 0    HYDROcodone-acetaminophen (NORCO)  mg per tablet Take 1 tablet by mouth every 8 (eight) hours as needed for Pain. Medically necessary (Patient not taking: Reported on 9/24/2019) 90 tablet 0    lancets Misc To check BG 3 times daily, to use with insurance preferred meter. (Patient not taking: Reported on 9/24/2019) 300 each 11     No current facility-administered medications on file prior to visit.        Diet Education Discussed:    Breakfast:  Boiled egg  Lunch:  Baked chicken pork chop, cheese  Dinner:  Same as lunch  Snacking: pork skins    Exercise/Activity Discussed:    Walking more    Behavior or Diet Goals for this patient:    Continue weight loss, expect to be closer to 200.  Low carb, low cely dieting.  Exercise routine as she feels better.    Hernia is  getting softer with weight loss, we were planning for November 15 but she is thinking more like January.       Anabel smoking still       : I met with the patient for 15 minutes and counseled her for over 50% of that time

## 2019-10-02 DIAGNOSIS — F43.20 ADULT SITUATIONAL STRESS DISORDER: ICD-10-CM

## 2019-10-02 RX ORDER — TRAZODONE HYDROCHLORIDE 100 MG/1
TABLET ORAL
Qty: 90 TABLET | Refills: 11 | Status: SHIPPED | OUTPATIENT
Start: 2019-10-02 | End: 2020-03-10

## 2019-10-02 RX ORDER — LISINOPRIL 20 MG/1
TABLET ORAL
Qty: 90 TABLET | Refills: 11 | Status: SHIPPED | OUTPATIENT
Start: 2019-10-02 | End: 2020-03-10 | Stop reason: SDUPTHER

## 2019-10-10 PROCEDURE — G0179 PR HOME HEALTH MD RECERTIFICATION: ICD-10-PCS | Mod: ,,, | Performed by: FAMILY MEDICINE

## 2019-10-10 PROCEDURE — G0179 MD RECERTIFICATION HHA PT: HCPCS | Mod: ,,, | Performed by: FAMILY MEDICINE

## 2019-10-15 ENCOUNTER — TELEPHONE (OUTPATIENT)
Dept: BARIATRICS | Facility: CLINIC | Age: 69
End: 2019-10-15

## 2019-10-15 ENCOUNTER — OUTPATIENT CASE MANAGEMENT (OUTPATIENT)
Dept: ADMINISTRATIVE | Facility: OTHER | Age: 69
End: 2019-10-15

## 2019-10-15 DIAGNOSIS — M51.36 LUMBAR DEGENERATIVE DISC DISEASE: ICD-10-CM

## 2019-10-15 DIAGNOSIS — M43.12 SPONDYLOLISTHESIS OF CERVICAL REGION: ICD-10-CM

## 2019-10-15 RX ORDER — HYDROCODONE BITARTRATE AND ACETAMINOPHEN 10; 325 MG/1; MG/1
1 TABLET ORAL EVERY 8 HOURS PRN
Qty: 90 TABLET | Refills: 0 | Status: CANCELLED | OUTPATIENT
Start: 2019-10-15

## 2019-10-15 NOTE — TELEPHONE ENCOUNTER
Patient requesting a refill of Hydrocodone.  LR--9/16/19  LOV--8/16/19  FOV--12/2/19  Urine Toxicology--12/6/18  Pain Contract--12/6/18

## 2019-10-16 ENCOUNTER — PATIENT MESSAGE (OUTPATIENT)
Dept: FAMILY MEDICINE | Facility: CLINIC | Age: 69
End: 2019-10-16

## 2019-10-19 RX ORDER — HYDROCODONE BITARTRATE AND ACETAMINOPHEN 10; 325 MG/1; MG/1
1 TABLET ORAL EVERY 8 HOURS PRN
Qty: 90 TABLET | Refills: 0 | Status: SHIPPED | OUTPATIENT
Start: 2019-10-19 | End: 2019-11-12 | Stop reason: SDUPTHER

## 2019-10-28 ENCOUNTER — EXTERNAL HOME HEALTH (OUTPATIENT)
Dept: HOME HEALTH SERVICES | Facility: HOSPITAL | Age: 69
End: 2019-10-28
Payer: MEDICARE

## 2019-10-29 ENCOUNTER — TELEPHONE (OUTPATIENT)
Dept: PLASTIC SURGERY | Facility: CLINIC | Age: 69
End: 2019-10-29

## 2019-10-29 ENCOUNTER — PATIENT MESSAGE (OUTPATIENT)
Dept: FAMILY MEDICINE | Facility: CLINIC | Age: 69
End: 2019-10-29

## 2019-10-29 DIAGNOSIS — S31.109A WOUND OF ABDOMEN: Primary | ICD-10-CM

## 2019-10-29 NOTE — TELEPHONE ENCOUNTER
I attempted to contact pt to schedule an appointment with Dr. Francois.  Pt was not available at the time of the call.  I left a detailed VM asking pt to call PLS office at her convenience.     ----- Message from JIGNESH Hess sent at 10/29/2019  9:10 AM CDT -----  Regarding: FW: Speak to Dr Francois   Can you please schedule patient to see Dr Francois in clinic in [place] to discuss scheduling her surgery. It will be a combo case with Dr. Lucero (Panniculectomy and Hernia repair)    Thanks.     ----- Message -----  From: Aysha Ku  Sent: 10/28/2019   2:29 PM CDT  To: JIGNESH Hess  Subject: FW: Speak to Dr Priscila Cline     This is just a remember to speak to Dr. Francois regarding scheduling surgery with Dr. Jazmine Olmstead    ----- Message -----  From: Aysha Ku  Sent: 10/16/2019   3:42 PM CDT  To: JIGNESH Hess  Subject: Speak to Dr Priscila Cline     Please speak to Dr. Francois regarding scheduling on patient Nelly Tian, MRN [id number], with Dr. Lucero.        Thanks  Aysha       ----- Message -----  From: Aysha Ku  Sent: 10/9/2019   5:37 PM CDT  To: JIGNESH Hess, Larisa Lino, #  Subject: FW: Surgery Request                              Please note     The patient surgery schedule sheet was scanned to .    Aysha       ----- Message -----  From: Aysha Ku  Sent: 10/9/2019   5:34 PM CDT  To: JIGNESH Hess, Larisa Lino, #  Subject: Surgery Request                                  Good afternoon     The patient pre-determination for Panniculectomy was approved by [facility].  However, the patient has medical complications and the patient surgery will need to be coordinated with Dr. Lucero.  Please reach out to the staff and to the patient to schedule surgery.    Thanks  Aysha

## 2019-10-31 RX ORDER — CEFADROXIL 500 MG/1
500 CAPSULE ORAL EVERY 12 HOURS
Qty: 20 CAPSULE | Refills: 2 | Status: SHIPPED | OUTPATIENT
Start: 2019-10-31 | End: 2019-11-05

## 2019-11-04 ENCOUNTER — OUTPATIENT CASE MANAGEMENT (OUTPATIENT)
Dept: ADMINISTRATIVE | Facility: OTHER | Age: 69
End: 2019-11-04

## 2019-11-05 ENCOUNTER — OFFICE VISIT (OUTPATIENT)
Dept: SURGERY | Facility: CLINIC | Age: 69
End: 2019-11-05
Payer: MEDICARE

## 2019-11-05 ENCOUNTER — TELEPHONE (OUTPATIENT)
Dept: CARDIOLOGY | Facility: CLINIC | Age: 69
End: 2019-11-05

## 2019-11-05 VITALS
DIASTOLIC BLOOD PRESSURE: 60 MMHG | SYSTOLIC BLOOD PRESSURE: 112 MMHG | BODY MASS INDEX: 34.39 KG/M2 | RESPIRATION RATE: 16 BRPM | HEART RATE: 61 BPM | HEIGHT: 68 IN | TEMPERATURE: 98 F | WEIGHT: 226.94 LBS

## 2019-11-05 DIAGNOSIS — E11.40 TYPE 2 DIABETES MELLITUS WITH DIABETIC NEUROPATHY, WITHOUT LONG-TERM CURRENT USE OF INSULIN: ICD-10-CM

## 2019-11-05 DIAGNOSIS — E66.01 SEVERE OBESITY (BMI 35.0-35.9 WITH COMORBIDITY): Primary | ICD-10-CM

## 2019-11-05 PROCEDURE — 3074F PR MOST RECENT SYSTOLIC BLOOD PRESSURE < 130 MM HG: ICD-10-PCS | Mod: HCNC,CPTII,S$GLB, | Performed by: SURGERY

## 2019-11-05 PROCEDURE — 3078F DIAST BP <80 MM HG: CPT | Mod: HCNC,CPTII,S$GLB, | Performed by: SURGERY

## 2019-11-05 PROCEDURE — 3044F HG A1C LEVEL LT 7.0%: CPT | Mod: HCNC,CPTII,S$GLB, | Performed by: SURGERY

## 2019-11-05 PROCEDURE — 99999 PR PBB SHADOW E&M-EST. PATIENT-LVL V: ICD-10-PCS | Mod: PBBFAC,HCNC,, | Performed by: SURGERY

## 2019-11-05 PROCEDURE — 1101F PT FALLS ASSESS-DOCD LE1/YR: CPT | Mod: HCNC,CPTII,S$GLB, | Performed by: SURGERY

## 2019-11-05 PROCEDURE — 3044F PR MOST RECENT HEMOGLOBIN A1C LEVEL <7.0%: ICD-10-PCS | Mod: HCNC,CPTII,S$GLB, | Performed by: SURGERY

## 2019-11-05 PROCEDURE — 99213 OFFICE O/P EST LOW 20 MIN: CPT | Mod: HCNC,S$GLB,, | Performed by: SURGERY

## 2019-11-05 PROCEDURE — 99213 PR OFFICE/OUTPT VISIT, EST, LEVL III, 20-29 MIN: ICD-10-PCS | Mod: HCNC,S$GLB,, | Performed by: SURGERY

## 2019-11-05 PROCEDURE — 99999 PR PBB SHADOW E&M-EST. PATIENT-LVL V: CPT | Mod: PBBFAC,HCNC,, | Performed by: SURGERY

## 2019-11-05 PROCEDURE — 99499 UNLISTED E&M SERVICE: CPT | Mod: HCNC,S$GLB,, | Performed by: SURGERY

## 2019-11-05 PROCEDURE — 3078F PR MOST RECENT DIASTOLIC BLOOD PRESSURE < 80 MM HG: ICD-10-PCS | Mod: HCNC,CPTII,S$GLB, | Performed by: SURGERY

## 2019-11-05 PROCEDURE — 99499 RISK ADDL DX/OHS AUDIT: ICD-10-PCS | Mod: HCNC,S$GLB,, | Performed by: SURGERY

## 2019-11-05 PROCEDURE — 3074F SYST BP LT 130 MM HG: CPT | Mod: HCNC,CPTII,S$GLB, | Performed by: SURGERY

## 2019-11-05 PROCEDURE — 1101F PR PT FALLS ASSESS DOC 0-1 FALLS W/OUT INJ PAST YR: ICD-10-PCS | Mod: HCNC,CPTII,S$GLB, | Performed by: SURGERY

## 2019-11-05 RX ORDER — CEFADROXIL 500 MG/1
500 CAPSULE ORAL EVERY 12 HOURS
Qty: 30 CAPSULE | Refills: 0 | Status: SHIPPED | OUTPATIENT
Start: 2019-11-05 | End: 2019-12-02 | Stop reason: SDUPTHER

## 2019-11-05 RX ORDER — BUDESONIDE 0.5 MG/2ML
INHALANT ORAL
COMMUNITY
Start: 2019-09-23 | End: 2020-03-10 | Stop reason: ALTCHOICE

## 2019-11-05 NOTE — PROGRESS NOTES
Medically Supervised Weight Loss Documentation    Date of Visit: 11/05/2019    Patient: Nelly Tian    Current Weight: 226  Current BMI: Body mass index is 34.51 kg/m².  Weight Change: - 25 pounds    Last Weight: 237    Beginning Weight: 251      Vitals:   Vitals:    11/05/19 1050   BP: 112/60   Pulse: 61   Resp: 16   Temp: 97.6 °F (36.4 °C)       Comorbidities:   Past Medical History:   Diagnosis Date    *Atrial flutter     Angina pectoris 9/18/2017    Anxiety     Arthritis     Asthma     Atrial fibrillation     Back pain     Cataract     OD    CHF (congestive heart failure)     COPD (chronic obstructive pulmonary disease)     Depression     Diabetes mellitus     Emphysema of lung     Heart failure     Hepatomegaly 2/3/2016    Hernia     History of MI (myocardial infarction) 1/19/2016    Hypercapnic respiratory failure, chronic 11/16/2016    Hyperlipidemia     Hypertension     Iron deficiency anemia 2/3/2016    Myocardial infarction     Obesity     Peripheral vascular disease     Pneumonia     Polyneuropathy     Retinal detachment     OS    Septic shock 4/23/2017    Skin ulcer 3/18/2017    Tobacco dependence     Type II 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, not stated as uncontrolled(250.60)        Medications:  Current Outpatient Medications on File Prior to Visit   Medication Sig Dispense Refill    apixaban (ELIQUIS) 5 mg Tab Take 1 tablet (5 mg total) by mouth 2 (two) times daily. 180 tablet 3    ascorbic acid, vitamin C, (VITAMIN C) 500 MG tablet Take 2 tablets (1,000 mg total) by mouth every evening.      atorvastatin (LIPITOR) 20 MG tablet Take 1 tablet (20 mg total) by mouth once daily. 90 tablet 3    blood-glucose meter (TRUE METRIX AIR GLUCOSE METER) kit AC meals. Insulin dependent.Dispence strips and lancets 3 months. 1 each 0    BREO ELLIPTA 100-25 mcg/dose diskus inhaler INHALE 1 PUFF INTO THE LUNGS ONCE DAILY. 180 each 2     cefadroxil (DURICEF) 500 MG Cap Take 1 capsule (500 mg total) by mouth every 12 (twelve) hours. 20 capsule 2    clonazePAM (KLONOPIN) 0.5 MG tablet Take 1 tablet (0.5 mg total) by mouth 2 (two) times daily. TAKE ONE TABLET BY MOUTH TWO TIMES A DAY AS NEEDED. Medically necessary 60 tablet 2    ferrous sulfate (FEOSOL) 325 mg (65 mg iron) Tab tablet Take 1 tablet (325 mg total) by mouth 2 (two) times daily. 60 tablet 3    fluticasone (FLONASE) 50 mcg/actuation nasal spray 1 spray (50 mcg total) by Each Nare route once daily. 1 Bottle 2    furosemide (LASIX) 40 MG tablet Take 0.5 tablets (20 mg total) by mouth once daily. 15 tablet 2    furosemide (LASIX) 40 MG tablet Take 0.5 tablets (20 mg total) by mouth once daily. 15 tablet 3    gabapentin (NEURONTIN) 800 MG tablet Take 1 tablet (800 mg total) by mouth 3 (three) times daily. 270 tablet 3    HYDROcodone-acetaminophen (NORCO)  mg per tablet Take 1 tablet by mouth every 8 (eight) hours as needed for Pain. Medical necessary 90 tablet 0    lancets Misc To check BG 3 times daily, to use with insurance preferred meter. 300 each 11    lisinopril (PRINIVIL,ZESTRIL) 20 MG tablet TAKE ONE TABLET BY MOUTH EVERY DAY 90 tablet 11    loratadine (CLARITIN) 10 mg tablet Take 1 tablet (10 mg total) by mouth once daily.  0    metFORMIN (GLUCOPHAGE) 500 MG tablet TAKE 1 TABLET (500 MG TOTAL) BY MOUTH 2 (TWO) TIMES DAILY WITH MEALS. 180 tablet 3    multivitamin (THERAGRAN) tablet Take 1 tablet by mouth once daily.      nystatin (NYSTOP) powder Apply topically 2 (two) times daily. 120 g 11    nystatin-triamcinolone (MYCOLOG II) cream Apply topically 3 (three) times daily. 30 g 1    omega-3 acid ethyl esters (LOVAZA) 1 gram capsule 2 (two) times daily.       omeprazole (PRILOSEC) 40 MG capsule Take 1 capsule (40 mg total) by mouth once daily. 90 capsule 3    ondansetron (ZOFRAN-ODT) 8 MG TbDL Take 1 tablet (8 mg total) by mouth every 6 (six) hours as needed.  20 tablet 3    polyethylene glycol (GLYCOLAX) 17 gram/dose powder       potassium chloride SA (K-DUR,KLOR-CON) 20 MEQ tablet TAKE ONE TABLET BY MOUTH EVERY DAY 30 tablet 11    senna-docusate 8.6-50 mg (PERICOLACE) 8.6-50 mg per tablet Take 1 tablet by mouth 2 (two) times daily as needed for Constipation.      spironolactone (ALDACTONE) 25 MG tablet Take 1 tablet (25 mg total) by mouth twice a week. 8 tablet 11    traZODone (DESYREL) 100 MG tablet TAKE 1 & 1/2 TABLET BY MOUTH EVERY EVENING 90 tablet 11    TRUE METRIX GLUCOSE TEST STRIP Strp TEST BLOOD SUGAR THREE TIMES A DAY  300 strip 3    albuterol (ACCUNEB) 0.63 mg/3 mL Nebu Take 3 mLs (0.63 mg total) by nebulization every 6 (six) hours as needed. Rescue (Patient not taking: Reported on 11/5/2019) 75 mL 11    albuterol (PROVENTIL/VENTOLIN HFA) 90 mcg/actuation inhaler INHALE 2 PUFFS INTO THE LUNGS EVERY 6 (SIX) HOURS AS NEEDED FOR RESCUE (Patient not taking: Reported on 11/5/2019) 54 g 3    budesonide (PULMICORT) 0.5 mg/2 mL nebulizer solution       DULoxetine (CYMBALTA) 30 MG capsule TAKE 1 CAPSULE ONCE DAILY (Patient not taking: Reported on 11/5/2019) 90 capsule 11    levalbuterol (XOPENEX) 0.63 mg/3 mL nebulizer solution INHALE THE CONTENTS OF 1 VIAL BY NEBULIZATION 4 times  DAILY.    DX: J43.9 (Patient not taking: Reported on 11/5/2019) 792 mL 3     No current facility-administered medications on file prior to visit.          Diet Education Discussed:    Breakfast:  eggs  Lunch:  Meat lunch  Dinner:  Pork roast rice and gravy  Snacking: pork skins    Exercise/Activity Discussed:    Walking more     Behavior or Diet Goals for this patient:    Continue weight loss, expect to be closer to 200.  Low carb, low cely dieting.  Exercise routine as she feels better.     Hernia is getting softer with weight loss, we were planning for November 15 but she is thinking more like January.    She is having some redness to the skin and is taking antibiotics will I re  order a months worth.  She needs to the ED if the redness does not resolve or gets sick as in fever chills.     Anabel smoking still      : I met with the patient for 15 minutes and counseled her for over 50% of that time

## 2019-11-05 NOTE — TELEPHONE ENCOUNTER
Called placed to Mrs. Tian in regards to new patient appointment. Explain to her that Dr. Mack is not currently not accepting new patient appointments in [place]. Provided her with [facility] group referral number.

## 2019-11-06 DIAGNOSIS — J43.9 PULMONARY EMPHYSEMA, UNSPECIFIED EMPHYSEMA TYPE: ICD-10-CM

## 2019-11-07 ENCOUNTER — PATIENT MESSAGE (OUTPATIENT)
Dept: FAMILY MEDICINE | Facility: CLINIC | Age: 69
End: 2019-11-07

## 2019-11-07 RX ORDER — LEVALBUTEROL INHALATION SOLUTION 0.63 MG/3ML
SOLUTION RESPIRATORY (INHALATION)
Qty: 792 ML | Refills: 0 | Status: SHIPPED | OUTPATIENT
Start: 2019-11-07 | End: 2020-04-30 | Stop reason: SDUPTHER

## 2019-11-11 ENCOUNTER — TELEPHONE (OUTPATIENT)
Dept: HOME HEALTH SERVICES | Facility: HOSPITAL | Age: 69
End: 2019-11-11

## 2019-11-12 ENCOUNTER — OFFICE VISIT (OUTPATIENT)
Dept: FAMILY MEDICINE | Facility: CLINIC | Age: 69
End: 2019-11-12
Payer: MEDICARE

## 2019-11-12 ENCOUNTER — TELEPHONE (OUTPATIENT)
Dept: PLASTIC SURGERY | Facility: CLINIC | Age: 69
End: 2019-11-12

## 2019-11-12 VITALS
HEART RATE: 86 BPM | DIASTOLIC BLOOD PRESSURE: 76 MMHG | BODY MASS INDEX: 33.75 KG/M2 | OXYGEN SATURATION: 95 % | SYSTOLIC BLOOD PRESSURE: 128 MMHG | TEMPERATURE: 99 F | HEIGHT: 68 IN | WEIGHT: 222.69 LBS

## 2019-11-12 DIAGNOSIS — M51.36 LUMBAR DEGENERATIVE DISC DISEASE: ICD-10-CM

## 2019-11-12 DIAGNOSIS — M43.12 SPONDYLOLISTHESIS OF CERVICAL REGION: ICD-10-CM

## 2019-11-12 DIAGNOSIS — I73.9 PVD (PERIPHERAL VASCULAR DISEASE): ICD-10-CM

## 2019-11-12 DIAGNOSIS — J41.0 SIMPLE CHRONIC BRONCHITIS: Primary | ICD-10-CM

## 2019-11-12 DIAGNOSIS — F17.200 TOBACCO DEPENDENCY: ICD-10-CM

## 2019-11-12 PROCEDURE — 99213 OFFICE O/P EST LOW 20 MIN: CPT | Mod: 25,HCNC,S$GLB, | Performed by: PHYSICIAN ASSISTANT

## 2019-11-12 PROCEDURE — G0008 ADMIN INFLUENZA VIRUS VAC: HCPCS | Mod: HCNC,S$GLB,, | Performed by: PHYSICIAN ASSISTANT

## 2019-11-12 PROCEDURE — 99499 UNLISTED E&M SERVICE: CPT | Mod: HCNC,S$GLB,, | Performed by: PHYSICIAN ASSISTANT

## 2019-11-12 PROCEDURE — 1101F PT FALLS ASSESS-DOCD LE1/YR: CPT | Mod: HCNC,CPTII,S$GLB, | Performed by: PHYSICIAN ASSISTANT

## 2019-11-12 PROCEDURE — 90662 IIV NO PRSV INCREASED AG IM: CPT | Mod: HCNC,S$GLB,, | Performed by: PHYSICIAN ASSISTANT

## 2019-11-12 PROCEDURE — 90662 FLU VACCINE - HIGH DOSE (65+) PRESERVATIVE FREE IM: ICD-10-PCS | Mod: HCNC,S$GLB,, | Performed by: PHYSICIAN ASSISTANT

## 2019-11-12 PROCEDURE — 99213 PR OFFICE/OUTPT VISIT, EST, LEVL III, 20-29 MIN: ICD-10-PCS | Mod: 25,HCNC,S$GLB, | Performed by: PHYSICIAN ASSISTANT

## 2019-11-12 PROCEDURE — 99499 RISK ADDL DX/OHS AUDIT: ICD-10-PCS | Mod: HCNC,S$GLB,, | Performed by: PHYSICIAN ASSISTANT

## 2019-11-12 PROCEDURE — 99999 PR PBB SHADOW E&M-EST. PATIENT-LVL V: CPT | Mod: PBBFAC,HCNC,, | Performed by: PHYSICIAN ASSISTANT

## 2019-11-12 PROCEDURE — 99999 PR PBB SHADOW E&M-EST. PATIENT-LVL V: ICD-10-PCS | Mod: PBBFAC,HCNC,, | Performed by: PHYSICIAN ASSISTANT

## 2019-11-12 PROCEDURE — 3074F PR MOST RECENT SYSTOLIC BLOOD PRESSURE < 130 MM HG: ICD-10-PCS | Mod: HCNC,CPTII,S$GLB, | Performed by: PHYSICIAN ASSISTANT

## 2019-11-12 PROCEDURE — 3078F PR MOST RECENT DIASTOLIC BLOOD PRESSURE < 80 MM HG: ICD-10-PCS | Mod: HCNC,CPTII,S$GLB, | Performed by: PHYSICIAN ASSISTANT

## 2019-11-12 PROCEDURE — 3078F DIAST BP <80 MM HG: CPT | Mod: HCNC,CPTII,S$GLB, | Performed by: PHYSICIAN ASSISTANT

## 2019-11-12 PROCEDURE — 3074F SYST BP LT 130 MM HG: CPT | Mod: HCNC,CPTII,S$GLB, | Performed by: PHYSICIAN ASSISTANT

## 2019-11-12 PROCEDURE — 1101F PR PT FALLS ASSESS DOC 0-1 FALLS W/OUT INJ PAST YR: ICD-10-PCS | Mod: HCNC,CPTII,S$GLB, | Performed by: PHYSICIAN ASSISTANT

## 2019-11-12 PROCEDURE — G0008 FLU VACCINE - HIGH DOSE (65+) PRESERVATIVE FREE IM: ICD-10-PCS | Mod: HCNC,S$GLB,, | Performed by: PHYSICIAN ASSISTANT

## 2019-11-12 NOTE — TELEPHONE ENCOUNTER
2nd attempt to contact pt to schedule consult.  Pt was not available at the time of the appointment.  I left a detailed VM asking pt to call PLS office if she is still interested in scheduling.       ----- Message from JIGNESH Hess sent at 10/29/2019  9:10 AM CDT -----  Regarding: FW: Speak to Dr Francois   Can you please schedule patient to see Dr Francois in clinic in [place] to discuss scheduling her surgery. It will be a combo case with Dr. Lucero (Panniculectomy and Hernia repair)    Thanks.     ----- Message -----  From: Aysha Ku  Sent: 10/28/2019   2:29 PM CDT  To: JIGNESH Hess  Subject: FW: Speak to Dr Priscila Cline     This is just a remember to speak to Dr. Francois regarding scheduling surgery with Dr. Jazmine Olmstead    ----- Message -----  From: Aysha Ku  Sent: 10/16/2019   3:42 PM CDT  To: JIGNESH Hess  Subject: Speak to Dr Priscila Cline     Please speak to Dr. Francois regarding scheduling on patient Nelly Tian, MRN [id number], with Dr. Lucero.        Thanks  Aysha       ----- Message -----  From: Aysha Ku  Sent: 10/9/2019   5:37 PM CDT  To: JIGNESH Hess, Larisa Lino, #  Subject: FW: Surgery Request                              Please note     The patient surgery schedule sheet was scanned to .    Aysha       ----- Message -----  From: Aysha Ku  Sent: 10/9/2019   5:34 PM CDT  To: JIGNESH Hess, Larisa Lino, #  Subject: Surgery Request                                  Good afternoon     The patient pre-determination for Panniculectomy was approved by [facility].  However, the patient has medical complications and the patient surgery will need to be coordinated with Dr. Lucero.  Please reach out to the staff and to the patient to schedule surgery.    Thanks  Aysha

## 2019-11-12 NOTE — PROGRESS NOTES
Subjective:       Patient ID: Nelly Tian is a 68 y.o. female.    Chief Complaint: Cough and Sinus Problem    HPI   Cough and congestion x 1 wk  No chills or fever  Hx of allergy  Pt is a current smoker with COPD hx  Pt requests flu shot  Review of Systems   Constitutional: Negative.  Negative for activity change, appetite change, chills, diaphoresis, fatigue, fever and unexpected weight change.   HENT: Positive for congestion and postnasal drip. Negative for sinus pressure, sinus pain and sore throat.    Eyes: Negative.    Respiratory: Positive for cough. Negative for shortness of breath and wheezing.    Cardiovascular: Negative.  Negative for chest pain and leg swelling.   Gastrointestinal: Negative.    Genitourinary: Negative.    Musculoskeletal: Negative.    Skin: Negative.  Negative for rash.       Objective:      Physical Exam   Constitutional: She appears well-developed and well-nourished. No distress.   HENT:   Head: Normocephalic and atraumatic.   Right Ear: External ear normal.   Left Ear: External ear normal.   Nose: Nose normal.   Mouth/Throat: Oropharynx is clear and moist. No oropharyngeal exudate.   Sinuses non tender  Mucus clear   Eyes: Conjunctivae are normal. No scleral icterus.   Neck: Normal range of motion. Neck supple. No tracheal deviation present. No thyromegaly present.   Cardiovascular: Normal rate, regular rhythm, normal heart sounds and intact distal pulses. Exam reveals no gallop and no friction rub.   No murmur heard.  Pulmonary/Chest: Effort normal and breath sounds normal. No stridor. No respiratory distress. She has no wheezes. She has no rales.   Musculoskeletal: She exhibits no edema.   Lymphadenopathy:     She has no cervical adenopathy.   Skin: Skin is warm and dry. No rash noted.   Varicosities and spider viens both lower ext.   Vitals reviewed.      Assessment:       1. Simple chronic bronchitis    2. PVD (peripheral vascular disease)    3. Tobacco dependency         Plan:       Nelly was seen today for cough and sinus problem.    Diagnoses and all orders for this visit:    Simple chronic bronchitis    PVD (peripheral vascular disease)    Tobacco dependency    Other orders  -     Influenza - High Dose (65+) (PF) (IM)    DC smoking  Discussed otc's  OK flu shot today

## 2019-11-12 NOTE — TELEPHONE ENCOUNTER
LR--10-19-19  LOV--8-16-19  FOV--11-12-19  Pain Clinic Drug Screen--12-6-18  Pain Contract--12-6-18  Checked [place] Prescription Monitoring Program site. No unusual or abnormal behavior noted.

## 2019-11-13 ENCOUNTER — TELEPHONE (OUTPATIENT)
Dept: HOME HEALTH SERVICES | Facility: HOSPITAL | Age: 69
End: 2019-11-13

## 2019-11-13 RX ORDER — HYDROCODONE BITARTRATE AND ACETAMINOPHEN 10; 325 MG/1; MG/1
1 TABLET ORAL EVERY 8 HOURS PRN
Qty: 90 TABLET | Refills: 0 | Status: SHIPPED | OUTPATIENT
Start: 2019-11-13 | End: 2019-12-02 | Stop reason: SDUPTHER

## 2019-11-19 ENCOUNTER — PATIENT OUTREACH (OUTPATIENT)
Dept: ADMINISTRATIVE | Facility: HOSPITAL | Age: 69
End: 2019-11-19

## 2019-11-19 NOTE — PROGRESS NOTES
Health Maintenance Due   Topic Date Due    Shingles Vaccine (1 of 2) 11/15/2001    Naloxone Prescription  12/19/2018    Foot Exam  06/02/2019    Urine Drug Screen  06/06/2019    Hemoglobin A1c  07/19/2019    Eye Exam  09/10/2019

## 2019-11-20 ENCOUNTER — TELEPHONE (OUTPATIENT)
Dept: HOME HEALTH SERVICES | Facility: HOSPITAL | Age: 69
End: 2019-11-20

## 2019-12-02 ENCOUNTER — OFFICE VISIT (OUTPATIENT)
Dept: FAMILY MEDICINE | Facility: CLINIC | Age: 69
End: 2019-12-02
Payer: MEDICARE

## 2019-12-02 VITALS
DIASTOLIC BLOOD PRESSURE: 60 MMHG | SYSTOLIC BLOOD PRESSURE: 126 MMHG | TEMPERATURE: 98 F | OXYGEN SATURATION: 99 % | HEIGHT: 68 IN | HEART RATE: 59 BPM | BODY MASS INDEX: 34.08 KG/M2 | WEIGHT: 224.88 LBS

## 2019-12-02 DIAGNOSIS — Z12.31 OTHER SCREENING MAMMOGRAM: Primary | ICD-10-CM

## 2019-12-02 DIAGNOSIS — E11.43 GASTROPARESIS DIABETICORUM: ICD-10-CM

## 2019-12-02 DIAGNOSIS — I50.22 CHRONIC SYSTOLIC CONGESTIVE HEART FAILURE: ICD-10-CM

## 2019-12-02 DIAGNOSIS — M51.36 LUMBAR DEGENERATIVE DISC DISEASE: ICD-10-CM

## 2019-12-02 DIAGNOSIS — M43.12 SPONDYLOLISTHESIS OF CERVICAL REGION: ICD-10-CM

## 2019-12-02 DIAGNOSIS — E11.40 TYPE 2 DIABETES MELLITUS WITH DIABETIC NEUROPATHY, WITHOUT LONG-TERM CURRENT USE OF INSULIN: ICD-10-CM

## 2019-12-02 DIAGNOSIS — F43.20 ADULT SITUATIONAL STRESS DISORDER: ICD-10-CM

## 2019-12-02 DIAGNOSIS — I10 HYPERTENSION, UNSPECIFIED TYPE: ICD-10-CM

## 2019-12-02 DIAGNOSIS — K45.8 FLANK HERNIA: ICD-10-CM

## 2019-12-02 DIAGNOSIS — L03.90 CELLULITIS OF SKIN: ICD-10-CM

## 2019-12-02 DIAGNOSIS — K31.84 GASTROPARESIS DIABETICORUM: ICD-10-CM

## 2019-12-02 DIAGNOSIS — R11.11 NON-INTRACTABLE VOMITING WITHOUT NAUSEA, UNSPECIFIED VOMITING TYPE: ICD-10-CM

## 2019-12-02 PROCEDURE — 99999 PR PBB SHADOW E&M-EST. PATIENT-LVL IV: CPT | Mod: PBBFAC,HCNC,, | Performed by: FAMILY MEDICINE

## 2019-12-02 PROCEDURE — 1126F AMNT PAIN NOTED NONE PRSNT: CPT | Mod: HCNC,S$GLB,, | Performed by: FAMILY MEDICINE

## 2019-12-02 PROCEDURE — 3044F HG A1C LEVEL LT 7.0%: CPT | Mod: HCNC,CPTII,S$GLB, | Performed by: FAMILY MEDICINE

## 2019-12-02 PROCEDURE — 1159F MED LIST DOCD IN RCRD: CPT | Mod: HCNC,S$GLB,, | Performed by: FAMILY MEDICINE

## 2019-12-02 PROCEDURE — 1159F PR MEDICATION LIST DOCUMENTED IN MEDICAL RECORD: ICD-10-PCS | Mod: HCNC,S$GLB,, | Performed by: FAMILY MEDICINE

## 2019-12-02 PROCEDURE — 99214 PR OFFICE/OUTPT VISIT, EST, LEVL IV, 30-39 MIN: ICD-10-PCS | Mod: HCNC,S$GLB,, | Performed by: FAMILY MEDICINE

## 2019-12-02 PROCEDURE — 3074F PR MOST RECENT SYSTOLIC BLOOD PRESSURE < 130 MM HG: ICD-10-PCS | Mod: HCNC,CPTII,S$GLB, | Performed by: FAMILY MEDICINE

## 2019-12-02 PROCEDURE — 1101F PR PT FALLS ASSESS DOC 0-1 FALLS W/OUT INJ PAST YR: ICD-10-PCS | Mod: HCNC,CPTII,S$GLB, | Performed by: FAMILY MEDICINE

## 2019-12-02 PROCEDURE — 3044F PR MOST RECENT HEMOGLOBIN A1C LEVEL <7.0%: ICD-10-PCS | Mod: HCNC,CPTII,S$GLB, | Performed by: FAMILY MEDICINE

## 2019-12-02 PROCEDURE — 3074F SYST BP LT 130 MM HG: CPT | Mod: HCNC,CPTII,S$GLB, | Performed by: FAMILY MEDICINE

## 2019-12-02 PROCEDURE — 3078F DIAST BP <80 MM HG: CPT | Mod: HCNC,CPTII,S$GLB, | Performed by: FAMILY MEDICINE

## 2019-12-02 PROCEDURE — 3078F PR MOST RECENT DIASTOLIC BLOOD PRESSURE < 80 MM HG: ICD-10-PCS | Mod: HCNC,CPTII,S$GLB, | Performed by: FAMILY MEDICINE

## 2019-12-02 PROCEDURE — 1101F PT FALLS ASSESS-DOCD LE1/YR: CPT | Mod: HCNC,CPTII,S$GLB, | Performed by: FAMILY MEDICINE

## 2019-12-02 PROCEDURE — 1126F PR PAIN SEVERITY QUANTIFIED, NO PAIN PRESENT: ICD-10-PCS | Mod: HCNC,S$GLB,, | Performed by: FAMILY MEDICINE

## 2019-12-02 PROCEDURE — 99214 OFFICE O/P EST MOD 30 MIN: CPT | Mod: HCNC,S$GLB,, | Performed by: FAMILY MEDICINE

## 2019-12-02 PROCEDURE — 99999 PR PBB SHADOW E&M-EST. PATIENT-LVL IV: ICD-10-PCS | Mod: PBBFAC,HCNC,, | Performed by: FAMILY MEDICINE

## 2019-12-02 RX ORDER — FUROSEMIDE 40 MG/1
20 TABLET ORAL
Qty: 6 TABLET | Refills: 2
Start: 2019-12-02 | End: 2020-03-10

## 2019-12-02 RX ORDER — TRAZODONE HYDROCHLORIDE 100 MG/1
150 TABLET ORAL NIGHTLY
Qty: 90 TABLET | Refills: 11 | Status: CANCELLED | OUTPATIENT
Start: 2019-12-02

## 2019-12-02 RX ORDER — APIXABAN 5 MG/1
TABLET, FILM COATED ORAL
Qty: 180 TABLET | Refills: 3 | OUTPATIENT
Start: 2019-12-02

## 2019-12-02 RX ORDER — PREDNISONE 20 MG/1
20 TABLET ORAL 2 TIMES DAILY
Qty: 6 TABLET | Refills: 0 | Status: SHIPPED | OUTPATIENT
Start: 2019-12-02 | End: 2019-12-05

## 2019-12-02 RX ORDER — HYDROCODONE BITARTRATE AND ACETAMINOPHEN 10; 325 MG/1; MG/1
1 TABLET ORAL EVERY 12 HOURS PRN
Qty: 60 TABLET | Refills: 0 | Status: SHIPPED | OUTPATIENT
Start: 2019-12-02 | End: 2020-01-07 | Stop reason: SDUPTHER

## 2019-12-02 RX ORDER — ONDANSETRON 8 MG/1
8 TABLET, ORALLY DISINTEGRATING ORAL EVERY 6 HOURS PRN
Qty: 20 TABLET | Refills: 3 | Status: SHIPPED | OUTPATIENT
Start: 2019-12-02 | End: 2019-12-04 | Stop reason: SDUPTHER

## 2019-12-02 RX ORDER — CLONAZEPAM 0.5 MG/1
0.5 TABLET ORAL 2 TIMES DAILY
Qty: 60 TABLET | Refills: 2 | Status: SHIPPED | OUTPATIENT
Start: 2019-12-02 | End: 2020-03-02 | Stop reason: SDUPTHER

## 2019-12-02 RX ORDER — CEFADROXIL 500 MG/1
500 CAPSULE ORAL EVERY 12 HOURS
Qty: 30 CAPSULE | Refills: 0 | Status: ON HOLD | OUTPATIENT
Start: 2019-12-02 | End: 2019-12-17 | Stop reason: SDUPTHER

## 2019-12-02 NOTE — PROGRESS NOTES
Subjective:       Patient ID: Nelly Tian is a 69 y.o. female.    Chief Complaint: Diabetes and Cough    Cough   This is a chronic problem. The current episode started 1 to 4 weeks ago. The problem occurs hourly. The cough is non-productive. Associated symptoms include shortness of breath, weight loss and wheezing. Pertinent negatives include no chest pain, chills, ear congestion, ear pain, fever, headaches, heartburn, hemoptysis, myalgias, nasal congestion, postnasal drip or rash. The symptoms are aggravated by cold air. She has tried a beta-agonist inhaler and leukotriene antagonists (augmentin . and nebulizer) for the symptoms. The treatment provided moderate relief. Her past medical history is significant for bronchiectasis.   .She also complains of intermittent abdominal pain/hernia at today's visit. Describes fullness, burning after heavier meals, especially if lying down shortly after eating. No dysphagia. She has a huge chronic abdominal tenderness/hernia. This is progressive and with frequent venous stasis dermatitis/cellulitis The pathophysiology of reflux is discussed; anti-reflux measures such as raising the head of the bed, and avoiding lying down after meals are suggested. Try to avoid ASA, NSAID's, caffeine, peppermints, alcohol and tobacco. OTC H2 blockers and/or antacids are often very helpful for PRN use.   Review of Systems   Constitutional: Positive for unexpected weight change and weight loss. Negative for chills and fever.   HENT: Negative for ear pain and postnasal drip.    Respiratory: Positive for cough, shortness of breath and wheezing. Negative for hemoptysis.    Cardiovascular: Negative for chest pain.   Gastrointestinal: Positive for abdominal distention and abdominal pain. Negative for heartburn.   Genitourinary: Positive for frequency.   Musculoskeletal: Negative for myalgias.   Skin: Negative for rash.   Neurological: Negative for headaches.       Patient Active Problem List    Diagnosis    Severe obesity (BMI 35.0-35.9 with comorbidity)    Diabetes mellitus with neuropathy    Long term current use of anticoagulant therapy    Major depression, recurrent, chronic    GERD (gastroesophageal reflux disease)    Tobacco dependency    Chronic atrial fibrillation    Hypertension associated with diabetes    PVD (peripheral vascular disease)    Abdominal aortic atherosclerosis    Dependency on pain medication    Iron deficiency anemia    DDD (degenerative disc disease), lumbar    Type 2 diabetes mellitus with diabetic neuropathy, without long-term current use of insulin    Hyperlipidemia associated with type 2 diabetes mellitus    NSAID long-term use    Mixed restrictive and obstructive lung disease    Hypercapnic respiratory failure, chronic    COPD (chronic obstructive pulmonary disease)    CHF (congestive heart failure)    Hepatomegaly    Chronic combined systolic and diastolic CHF (congestive heart failure)    Decubitus ulcer    Type 2 diabetes mellitus, without long-term current use of insulin    Retinal detachment of left eye with multiple breaks    Chronic pain syndrome    Wound, open, abdominal wall, lateral    Other nonthrombocytopenic purpura    Encephalopathy, metabolic    Chronically on benzodiazepine therapy    Chronic prescription opiate use    Flank hernia       Objective:      Physical Exam   Constitutional: She is oriented to person, place, and time. She appears well-developed.   HENT:   Head: Normocephalic and atraumatic.   Right Ear: External ear normal.   Left Ear: External ear normal.   Nose: Nose normal.   Mouth/Throat: No oropharyngeal exudate.   Eyes: Pupils are equal, round, and reactive to light. Conjunctivae and EOM are normal. Right eye exhibits no discharge. Left eye exhibits no discharge. No scleral icterus.   Neck: Normal range of motion. Neck supple. No JVD present. No tracheal deviation present. No thyromegaly present.    Cardiovascular: Normal rate, normal heart sounds and intact distal pulses. Exam reveals no gallop and no friction rub.   No murmur heard.  Pulmonary/Chest: Effort normal. No stridor. No respiratory distress. She has no wheezes. She has no rales. She exhibits no tenderness.   Abdominal: Soft. Bowel sounds are normal. She exhibits no distension and no mass. There is no tenderness. There is no rebound and no guarding.   Musculoskeletal: Normal range of motion. She exhibits no edema.   Lymphadenopathy:     She has no cervical adenopathy.   Neurological: She is alert and oriented to person, place, and time. She displays normal reflexes. No cranial nerve deficit. She exhibits normal muscle tone. Coordination normal.   Skin: Skin is dry. No rash noted. She is not diaphoretic. No erythema. No pallor.   Psychiatric: Her speech is normal and behavior is normal. Judgment and thought content normal. Her mood appears anxious.   Vitals reviewed.      Lab Results   Component Value Date    WBC 7.96 07/24/2019    HGB 13.1 07/24/2019    HCT 43.9 07/24/2019     07/24/2019    CHOL 116 (L) 07/24/2019    TRIG 154 (H) 07/24/2019    HDL 26 (L) 07/24/2019    ALT 14 06/27/2019    AST 15 06/27/2019     06/27/2019    K 4.4 06/27/2019     06/27/2019    CREATININE 0.7 06/27/2019    BUN 13 06/27/2019    CO2 28 06/27/2019    TSH 0.405 05/03/2019    INR 1.0 06/27/2019    HGBA1C 5.6 01/19/2019     The ASCVD Risk score (Rell YOLANDA Jr., et al., 2013) failed to calculate for the following reasons:    The patient has a prior MI or stroke diagnosis    Assessment:       1. Other screening mammogram    2. Chronic systolic congestive heart failure    3. Adult situational stress disorder    4. Gastroparesis diabeticorum    5. Non-intractable vomiting without nausea, unspecified vomiting type    6. Flank hernia    7. Type 2 diabetes mellitus with diabetic neuropathy, without long-term current use of insulin    8. Lumbar degenerative disc  disease    9. Spondylolisthesis of cervical region    10. Cellulitis of skin        Plan:       Other screening mammogram  -     Mammo Digital Screening Bilateral With CAD; Future; Expected date: 12/02/2019    Chronic systolic congestive heart failure  -     furosemide (LASIX) 40 MG tablet; Take 0.5 tablets (20 mg total) by mouth 3 (three) times a week.  Dispense: 6 tablet; Refill: 2  -     predniSONE (DELTASONE) 20 MG tablet; Take 1 tablet (20 mg total) by mouth 2 (two) times daily. for 3 days  Dispense: 6 tablet; Refill: 0    Adult situational stress disorder    Gastroparesis diabeticorum  -     CT Abdomen Pelvis W Wo Contrast; Future; Expected date: 12/02/2019  -     ondansetron (ZOFRAN-ODT) 8 MG TbDL; Take 1 tablet (8 mg total) by mouth every 6 (six) hours as needed.  Dispense: 20 tablet; Refill: 3    Non-intractable vomiting without nausea, unspecified vomiting type  -     ondansetron (ZOFRAN-ODT) 8 MG TbDL; Take 1 tablet (8 mg total) by mouth every 6 (six) hours as needed.  Dispense: 20 tablet; Refill: 3    Flank hernia  -     CT Abdomen Pelvis W Wo Contrast; Future; Expected date: 12/02/2019    Type 2 diabetes mellitus with diabetic neuropathy, without long-term current use of insulin  -     Basic metabolic panel; Future; Expected date: 12/02/2019    Lumbar degenerative disc disease  -     HYDROcodone-acetaminophen (NORCO)  mg per tablet; Take 1 tablet by mouth every 12 (twelve) hours as needed for Pain. Medical necessary  Dispense: 60 tablet; Refill: 0  -     clonazePAM (KLONOPIN) 0.5 MG tablet; Take 1 tablet (0.5 mg total) by mouth 2 (two) times daily. TAKE ONE TABLET BY MOUTH TWO TIMES A DAY AS NEEDED. Medically necessary  Dispense: 60 tablet; Refill: 2    Spondylolisthesis of cervical region  -     HYDROcodone-acetaminophen (NORCO)  mg per tablet; Take 1 tablet by mouth every 12 (twelve) hours as needed for Pain. Medical necessary  Dispense: 60 tablet; Refill: 0    Cellulitis of skin  -      cefadroxil (DURICEF) 500 MG Cap; Take 1 capsule (500 mg total) by mouth every 12 (twelve) hours.  Dispense: 30 capsule; Refill: 0    Diabetes  Symptom onset has been insidious for a time period of several years .. Severity is described as mild-moderate. Course of her symptoms over time is gradual.  Improved. Lost 50 pounds, due to low starches diet.  Lipids improved, Anemia improved.  Chronic bronchitis  She also complains of increased allergy/cough symptoms. Frequency of symptoms: intermittent.    EYES: no redness, no discharge  EXTERNAL EAR CANALS: normal  RIGHT TM: normal color, normal landmarks, normal light reflex  LEFT TM: normal color, normal landmarks, normal light reflex  NOSE: clear discharge, pale boggy mucosa  MOUTH/THROAT: moist mucous membranes, no erythema, no exudate    Assessment: Seasonal allergic rhinitis/asthmaPlan: Nebulizer, and prednisone prn.    Patient readiness: acceptance and barriers:readiness and social stressors    During the course of the visit the patient was educated and counseled about the following:     Diabetes:  Discussed general issues about diabetes pathophysiology and management.  Addressed ADA diet.  Hypertension:   Screening for causes of secondary hypertension: GFR (chronic kidney disease).  Dietary sodium restriction.  Regular aerobic exercise.  Check blood pressures 3 times weekly and record.  Obesity:   General weight loss/lifestyle modification strategies discussed (elicit support from others; identify saboteurs; non-food rewards, etc).  Informal exercise measures discussed, e.g. taking stairs instead of elevator.  Regular aerobic exercise program discussed.    Goals: Diabetes: Maintain Hemoglobin A1C below 7, Hypertension: Reduce Blood Pressure and Obesity: Reduce calorie intake and BMI    Did patient meet goals/outcomes: No    The following self management tools provided: blood pressure log  blood glucose log  excercise log    Patient Instructions (the written plan)  was given to the patient/family.     Time spent with patient: 30 minutes    Barriers to medications present (no )    Adverse reactions to current medications (no)    Over the counter medications reviewed (Yes)        30-35minutes spent counseling patient on diet, exercise, and weight loss.  This has been fully explained to the patient, who indicates understanding.

## 2019-12-02 NOTE — PATIENT INSTRUCTIONS

## 2019-12-04 DIAGNOSIS — R11.11 NON-INTRACTABLE VOMITING WITHOUT NAUSEA, UNSPECIFIED VOMITING TYPE: ICD-10-CM

## 2019-12-04 DIAGNOSIS — K31.84 GASTROPARESIS DIABETICORUM: ICD-10-CM

## 2019-12-04 DIAGNOSIS — R09.81 CONGESTION OF NASAL SINUS: ICD-10-CM

## 2019-12-04 DIAGNOSIS — E11.43 GASTROPARESIS DIABETICORUM: ICD-10-CM

## 2019-12-04 RX ORDER — FLUTICASONE PROPIONATE 50 MCG
1 SPRAY, SUSPENSION (ML) NASAL DAILY
Qty: 1 BOTTLE | Refills: 3 | Status: SHIPPED | OUTPATIENT
Start: 2019-12-04 | End: 2020-04-30 | Stop reason: SDUPTHER

## 2019-12-04 RX ORDER — ONDANSETRON 8 MG/1
8 TABLET, ORALLY DISINTEGRATING ORAL EVERY 6 HOURS PRN
Qty: 20 TABLET | Refills: 3 | Status: SHIPPED | OUTPATIENT
Start: 2019-12-04 | End: 2020-02-18 | Stop reason: SDUPTHER

## 2019-12-09 PROCEDURE — G0179 MD RECERTIFICATION HHA PT: HCPCS | Mod: ,,, | Performed by: FAMILY MEDICINE

## 2019-12-09 PROCEDURE — G0179 PR HOME HEALTH MD RECERTIFICATION: ICD-10-PCS | Mod: ,,, | Performed by: FAMILY MEDICINE

## 2019-12-11 ENCOUNTER — EXTERNAL HOME HEALTH (OUTPATIENT)
Dept: HOME HEALTH SERVICES | Facility: HOSPITAL | Age: 69
End: 2019-12-11
Payer: MEDICARE

## 2019-12-13 ENCOUNTER — OUTPATIENT CASE MANAGEMENT (OUTPATIENT)
Dept: ADMINISTRATIVE | Facility: OTHER | Age: 69
End: 2019-12-13

## 2019-12-14 ENCOUNTER — HOSPITAL ENCOUNTER (INPATIENT)
Facility: HOSPITAL | Age: 69
LOS: 3 days | Discharge: HOME HEALTH | DRG: 872 | End: 2019-12-17
Attending: EMERGENCY MEDICINE | Admitting: FAMILY MEDICINE
Payer: MEDICARE

## 2019-12-14 DIAGNOSIS — E11.40 TYPE 2 DIABETES MELLITUS WITH DIABETIC NEUROPATHY, WITHOUT LONG-TERM CURRENT USE OF INSULIN: ICD-10-CM

## 2019-12-14 DIAGNOSIS — L03.311 CELLULITIS OF ABDOMINAL WALL: ICD-10-CM

## 2019-12-14 DIAGNOSIS — R00.1 BRADYCARDIA: ICD-10-CM

## 2019-12-14 DIAGNOSIS — M79.3 PANNICULITIS: ICD-10-CM

## 2019-12-14 DIAGNOSIS — I50.42 CHRONIC COMBINED SYSTOLIC AND DIASTOLIC CHF (CONGESTIVE HEART FAILURE): ICD-10-CM

## 2019-12-14 DIAGNOSIS — E66.01 SEVERE OBESITY (BMI 35.0-35.9 WITH COMORBIDITY): ICD-10-CM

## 2019-12-14 DIAGNOSIS — I48.20 CHRONIC ATRIAL FIBRILLATION: ICD-10-CM

## 2019-12-14 DIAGNOSIS — J42 CHRONIC BRONCHITIS, UNSPECIFIED CHRONIC BRONCHITIS TYPE: ICD-10-CM

## 2019-12-14 DIAGNOSIS — I15.2 HYPERTENSION ASSOCIATED WITH DIABETES: ICD-10-CM

## 2019-12-14 DIAGNOSIS — E11.59 HYPERTENSION ASSOCIATED WITH DIABETES: ICD-10-CM

## 2019-12-14 DIAGNOSIS — L03.90 SEPSIS DUE TO CELLULITIS: Primary | ICD-10-CM

## 2019-12-14 DIAGNOSIS — F33.9 MAJOR DEPRESSION, RECURRENT, CHRONIC: ICD-10-CM

## 2019-12-14 DIAGNOSIS — L03.90 CELLULITIS OF SKIN: ICD-10-CM

## 2019-12-14 DIAGNOSIS — A41.9 SEPSIS DUE TO CELLULITIS: Primary | ICD-10-CM

## 2019-12-14 LAB
ALBUMIN SERPL BCP-MCNC: 4 G/DL (ref 3.5–5.2)
ALP SERPL-CCNC: 105 U/L (ref 55–135)
ALT SERPL W/O P-5'-P-CCNC: 31 U/L (ref 10–44)
ANION GAP SERPL CALC-SCNC: 11 MMOL/L (ref 8–16)
AST SERPL-CCNC: 29 U/L (ref 10–40)
BACTERIA #/AREA URNS HPF: ABNORMAL /HPF
BASOPHILS # BLD AUTO: 0.05 K/UL (ref 0–0.2)
BASOPHILS NFR BLD: 0.2 % (ref 0–1.9)
BILIRUB SERPL-MCNC: 0.7 MG/DL (ref 0.1–1)
BILIRUB UR QL STRIP: NEGATIVE
BNP SERPL-MCNC: 172 PG/ML (ref 0–99)
BUN SERPL-MCNC: 14 MG/DL (ref 8–23)
CALCIUM SERPL-MCNC: 9.7 MG/DL (ref 8.7–10.5)
CHLORIDE SERPL-SCNC: 98 MMOL/L (ref 95–110)
CLARITY UR: CLEAR
CO2 SERPL-SCNC: 26 MMOL/L (ref 23–29)
COLOR UR: YELLOW
CREAT SERPL-MCNC: 0.7 MG/DL (ref 0.5–1.4)
DIFFERENTIAL METHOD: ABNORMAL
EOSINOPHIL # BLD AUTO: 0.1 K/UL (ref 0–0.5)
EOSINOPHIL NFR BLD: 0.2 % (ref 0–8)
ERYTHROCYTE [DISTWIDTH] IN BLOOD BY AUTOMATED COUNT: 15 % (ref 11.5–14.5)
EST. GFR  (AFRICAN AMERICAN): >60 ML/MIN/1.73 M^2
EST. GFR  (NON AFRICAN AMERICAN): >60 ML/MIN/1.73 M^2
GLUCOSE SERPL-MCNC: 76 MG/DL (ref 70–110)
GLUCOSE UR QL STRIP: NEGATIVE
HCT VFR BLD AUTO: 41.3 % (ref 37–48.5)
HGB BLD-MCNC: 12.2 G/DL (ref 12–16)
HGB UR QL STRIP: NEGATIVE
IMM GRANULOCYTES # BLD AUTO: 0.11 K/UL (ref 0–0.04)
INFLUENZA A, MOLECULAR: NEGATIVE
INFLUENZA B, MOLECULAR: NEGATIVE
KETONES UR QL STRIP: ABNORMAL
LACTATE SERPL-SCNC: 1.8 MMOL/L (ref 0.5–2.2)
LACTATE SERPL-SCNC: 1.8 MMOL/L (ref 0.5–2.2)
LEUKOCYTE ESTERASE UR QL STRIP: ABNORMAL
LYMPHOCYTES # BLD AUTO: 1 K/UL (ref 1–4.8)
LYMPHOCYTES NFR BLD: 5.1 % (ref 18–48)
MCH RBC QN AUTO: 25.8 PG (ref 27–31)
MCHC RBC AUTO-ENTMCNC: 29.5 G/DL (ref 32–36)
MCV RBC AUTO: 88 FL (ref 82–98)
MICROSCOPIC COMMENT: ABNORMAL
MONOCYTES # BLD AUTO: 1.8 K/UL (ref 0.3–1)
MONOCYTES NFR BLD: 8.9 % (ref 4–15)
NEUTROPHILS # BLD AUTO: 17.1 K/UL (ref 1.8–7.7)
NEUTROPHILS NFR BLD: 85.1 % (ref 38–73)
NITRITE UR QL STRIP: NEGATIVE
NRBC BLD-RTO: 0 /100 WBC
PH UR STRIP: 7 [PH] (ref 5–8)
PLATELET # BLD AUTO: 211 K/UL (ref 150–350)
PMV BLD AUTO: 10 FL (ref 9.2–12.9)
POCT GLUCOSE: 187 MG/DL (ref 70–110)
POTASSIUM SERPL-SCNC: 5.1 MMOL/L (ref 3.5–5.1)
PROCALCITONIN SERPL IA-MCNC: 0.15 NG/ML
PROT SERPL-MCNC: 7.5 G/DL (ref 6–8.4)
PROT UR QL STRIP: NEGATIVE
RBC # BLD AUTO: 4.72 M/UL (ref 4–5.4)
RBC #/AREA URNS HPF: 1 /HPF (ref 0–4)
SODIUM SERPL-SCNC: 135 MMOL/L (ref 136–145)
SP GR UR STRIP: 1.01 (ref 1–1.03)
SPECIMEN SOURCE: NORMAL
SQUAMOUS #/AREA URNS HPF: 3 /HPF
TROPONIN I SERPL DL<=0.01 NG/ML-MCNC: <0.006 NG/ML (ref 0–0.03)
TROPONIN I SERPL DL<=0.01 NG/ML-MCNC: <0.006 NG/ML (ref 0–0.03)
URN SPEC COLLECT METH UR: ABNORMAL
UROBILINOGEN UR STRIP-ACNC: NEGATIVE EU/DL
WBC # BLD AUTO: 20.1 K/UL (ref 3.9–12.7)
WBC #/AREA URNS HPF: 7 /HPF (ref 0–5)

## 2019-12-14 PROCEDURE — 25000003 PHARM REV CODE 250: Mod: HCNC | Performed by: EMERGENCY MEDICINE

## 2019-12-14 PROCEDURE — 36415 COLL VENOUS BLD VENIPUNCTURE: CPT | Mod: HCNC

## 2019-12-14 PROCEDURE — 81000 URINALYSIS NONAUTO W/SCOPE: CPT | Mod: HCNC

## 2019-12-14 PROCEDURE — 83605 ASSAY OF LACTIC ACID: CPT | Mod: 91,HCNC

## 2019-12-14 PROCEDURE — 83036 HEMOGLOBIN GLYCOSYLATED A1C: CPT | Mod: HCNC

## 2019-12-14 PROCEDURE — 25000003 PHARM REV CODE 250: Mod: HCNC | Performed by: FAMILY MEDICINE

## 2019-12-14 PROCEDURE — 85025 COMPLETE CBC W/AUTO DIFF WBC: CPT | Mod: HCNC

## 2019-12-14 PROCEDURE — 84145 PROCALCITONIN (PCT): CPT | Mod: HCNC

## 2019-12-14 PROCEDURE — 84484 ASSAY OF TROPONIN QUANT: CPT | Mod: HCNC

## 2019-12-14 PROCEDURE — 99291 CRITICAL CARE FIRST HOUR: CPT | Mod: 25,HCNC

## 2019-12-14 PROCEDURE — 63600175 PHARM REV CODE 636 W HCPCS: Mod: HCNC | Performed by: FAMILY MEDICINE

## 2019-12-14 PROCEDURE — 27000221 HC OXYGEN, UP TO 24 HOURS: Mod: HCNC

## 2019-12-14 PROCEDURE — 83880 ASSAY OF NATRIURETIC PEPTIDE: CPT | Mod: HCNC

## 2019-12-14 PROCEDURE — 87502 INFLUENZA DNA AMP PROBE: CPT | Mod: HCNC

## 2019-12-14 PROCEDURE — S0077 INJECTION, CLINDAMYCIN PHOSP: HCPCS | Mod: HCNC | Performed by: EMERGENCY MEDICINE

## 2019-12-14 PROCEDURE — 87040 BLOOD CULTURE FOR BACTERIA: CPT | Mod: HCNC

## 2019-12-14 PROCEDURE — 12000002 HC ACUTE/MED SURGE SEMI-PRIVATE ROOM: Mod: HCNC

## 2019-12-14 PROCEDURE — 96365 THER/PROPH/DIAG IV INF INIT: CPT | Mod: HCNC

## 2019-12-14 PROCEDURE — 25000242 PHARM REV CODE 250 ALT 637 W/ HCPCS: Mod: HCNC | Performed by: EMERGENCY MEDICINE

## 2019-12-14 PROCEDURE — 83605 ASSAY OF LACTIC ACID: CPT | Mod: HCNC

## 2019-12-14 PROCEDURE — 63600175 PHARM REV CODE 636 W HCPCS: Mod: HCNC | Performed by: EMERGENCY MEDICINE

## 2019-12-14 PROCEDURE — 94640 AIRWAY INHALATION TREATMENT: CPT | Mod: HCNC

## 2019-12-14 PROCEDURE — 80053 COMPREHEN METABOLIC PANEL: CPT | Mod: HCNC

## 2019-12-14 RX ORDER — HYDROCODONE BITARTRATE AND ACETAMINOPHEN 10; 325 MG/1; MG/1
1 TABLET ORAL 2 TIMES DAILY PRN
Status: DISCONTINUED | OUTPATIENT
Start: 2019-12-14 | End: 2019-12-15

## 2019-12-14 RX ORDER — CLINDAMYCIN PHOSPHATE 600 MG/50ML
600 INJECTION, SOLUTION INTRAVENOUS
Status: DISCONTINUED | OUTPATIENT
Start: 2019-12-15 | End: 2019-12-15

## 2019-12-14 RX ORDER — ACETAMINOPHEN 500 MG
1000 TABLET ORAL
Status: COMPLETED | OUTPATIENT
Start: 2019-12-14 | End: 2019-12-14

## 2019-12-14 RX ORDER — ALBUTEROL SULFATE 2.5 MG/.5ML
0.63 SOLUTION RESPIRATORY (INHALATION) EVERY 6 HOURS PRN
Status: DISCONTINUED | OUTPATIENT
Start: 2019-12-14 | End: 2019-12-15

## 2019-12-14 RX ORDER — CETIRIZINE HYDROCHLORIDE 10 MG/1
10 TABLET ORAL DAILY
Status: DISCONTINUED | OUTPATIENT
Start: 2019-12-15 | End: 2019-12-17 | Stop reason: HOSPADM

## 2019-12-14 RX ORDER — POTASSIUM CHLORIDE 20 MEQ/15ML
40 SOLUTION ORAL
Status: DISCONTINUED | OUTPATIENT
Start: 2019-12-14 | End: 2019-12-17 | Stop reason: HOSPADM

## 2019-12-14 RX ORDER — ALBUTEROL SULFATE 2.5 MG/.5ML
2.5 SOLUTION RESPIRATORY (INHALATION)
Status: COMPLETED | OUTPATIENT
Start: 2019-12-14 | End: 2019-12-14

## 2019-12-14 RX ORDER — ASCORBIC ACID 500 MG
1000 TABLET ORAL NIGHTLY
Status: DISCONTINUED | OUTPATIENT
Start: 2019-12-14 | End: 2019-12-17 | Stop reason: HOSPADM

## 2019-12-14 RX ORDER — INSULIN ASPART 100 [IU]/ML
0-5 INJECTION, SOLUTION INTRAVENOUS; SUBCUTANEOUS
Status: DISCONTINUED | OUTPATIENT
Start: 2019-12-14 | End: 2019-12-17 | Stop reason: HOSPADM

## 2019-12-14 RX ORDER — ONDANSETRON 2 MG/ML
4 INJECTION INTRAMUSCULAR; INTRAVENOUS EVERY 8 HOURS PRN
Status: DISCONTINUED | OUTPATIENT
Start: 2019-12-14 | End: 2019-12-17 | Stop reason: HOSPADM

## 2019-12-14 RX ORDER — IBUPROFEN 200 MG
16 TABLET ORAL
Status: DISCONTINUED | OUTPATIENT
Start: 2019-12-14 | End: 2019-12-17 | Stop reason: HOSPADM

## 2019-12-14 RX ORDER — AZITHROMYCIN 250 MG/1
500 TABLET, FILM COATED ORAL ONCE
Status: COMPLETED | OUTPATIENT
Start: 2019-12-14 | End: 2019-12-14

## 2019-12-14 RX ORDER — DULOXETIN HYDROCHLORIDE 30 MG/1
30 CAPSULE, DELAYED RELEASE ORAL DAILY
Status: DISCONTINUED | OUTPATIENT
Start: 2019-12-15 | End: 2019-12-17 | Stop reason: HOSPADM

## 2019-12-14 RX ORDER — TRAZODONE HYDROCHLORIDE 50 MG/1
150 TABLET ORAL NIGHTLY
Status: DISCONTINUED | OUTPATIENT
Start: 2019-12-14 | End: 2019-12-17 | Stop reason: HOSPADM

## 2019-12-14 RX ORDER — FLUTICASONE FUROATE AND VILANTEROL 100; 25 UG/1; UG/1
1 POWDER RESPIRATORY (INHALATION) DAILY
Status: DISCONTINUED | OUTPATIENT
Start: 2019-12-15 | End: 2019-12-17 | Stop reason: HOSPADM

## 2019-12-14 RX ORDER — FLUTICASONE FUROATE AND VILANTEROL 100; 25 UG/1; UG/1
1 POWDER RESPIRATORY (INHALATION) DAILY
Status: DISCONTINUED | OUTPATIENT
Start: 2019-12-15 | End: 2019-12-14 | Stop reason: SDUPTHER

## 2019-12-14 RX ORDER — NYSTATIN AND TRIAMCINOLONE ACETONIDE 100000; 1 [USP'U]/G; MG/G
CREAM TOPICAL 3 TIMES DAILY
Status: DISCONTINUED | OUTPATIENT
Start: 2019-12-14 | End: 2019-12-17 | Stop reason: HOSPADM

## 2019-12-14 RX ORDER — IBUPROFEN 200 MG
24 TABLET ORAL
Status: DISCONTINUED | OUTPATIENT
Start: 2019-12-14 | End: 2019-12-17 | Stop reason: HOSPADM

## 2019-12-14 RX ORDER — FLUTICASONE PROPIONATE 50 MCG
1 SPRAY, SUSPENSION (ML) NASAL DAILY
Status: DISCONTINUED | OUTPATIENT
Start: 2019-12-15 | End: 2019-12-17 | Stop reason: HOSPADM

## 2019-12-14 RX ORDER — LISINOPRIL 10 MG/1
20 TABLET ORAL DAILY
Status: DISCONTINUED | OUTPATIENT
Start: 2019-12-15 | End: 2019-12-17 | Stop reason: HOSPADM

## 2019-12-14 RX ORDER — FUROSEMIDE 20 MG/1
20 TABLET ORAL
Status: DISCONTINUED | OUTPATIENT
Start: 2019-12-16 | End: 2019-12-17 | Stop reason: HOSPADM

## 2019-12-14 RX ORDER — SODIUM CHLORIDE 0.9 % (FLUSH) 0.9 %
10 SYRINGE (ML) INJECTION
Status: DISCONTINUED | OUTPATIENT
Start: 2019-12-14 | End: 2019-12-17 | Stop reason: HOSPADM

## 2019-12-14 RX ORDER — FERROUS SULFATE 325(65) MG
325 TABLET, DELAYED RELEASE (ENTERIC COATED) ORAL DAILY
Status: DISCONTINUED | OUTPATIENT
Start: 2019-12-15 | End: 2019-12-17 | Stop reason: HOSPADM

## 2019-12-14 RX ORDER — CLINDAMYCIN PHOSPHATE 900 MG/50ML
900 INJECTION, SOLUTION INTRAVENOUS
Status: COMPLETED | OUTPATIENT
Start: 2019-12-14 | End: 2019-12-14

## 2019-12-14 RX ORDER — GLUCAGON 1 MG
1 KIT INJECTION
Status: DISCONTINUED | OUTPATIENT
Start: 2019-12-14 | End: 2019-12-17 | Stop reason: HOSPADM

## 2019-12-14 RX ORDER — PANTOPRAZOLE SODIUM 40 MG/1
40 TABLET, DELAYED RELEASE ORAL DAILY
Status: DISCONTINUED | OUTPATIENT
Start: 2019-12-15 | End: 2019-12-17 | Stop reason: HOSPADM

## 2019-12-14 RX ORDER — CLONAZEPAM 0.5 MG/1
0.5 TABLET ORAL 2 TIMES DAILY PRN
Status: DISCONTINUED | OUTPATIENT
Start: 2019-12-14 | End: 2019-12-17 | Stop reason: HOSPADM

## 2019-12-14 RX ORDER — GABAPENTIN 400 MG/1
800 CAPSULE ORAL 3 TIMES DAILY
Status: DISCONTINUED | OUTPATIENT
Start: 2019-12-14 | End: 2019-12-17 | Stop reason: HOSPADM

## 2019-12-14 RX ORDER — ATORVASTATIN CALCIUM 20 MG/1
20 TABLET, FILM COATED ORAL DAILY
Status: DISCONTINUED | OUTPATIENT
Start: 2019-12-15 | End: 2019-12-17 | Stop reason: HOSPADM

## 2019-12-14 RX ADMIN — TRAZODONE HYDROCHLORIDE 50 MG: 50 TABLET ORAL at 08:12

## 2019-12-14 RX ADMIN — AZITHROMYCIN 500 MG: 250 TABLET, FILM COATED ORAL at 06:12

## 2019-12-14 RX ADMIN — APIXABAN 5 MG: 2.5 TABLET, FILM COATED ORAL at 08:12

## 2019-12-14 RX ADMIN — NYSTATIN AND TRIAMCINOLONE ACETONIDE: 100000; 1 CREAM TOPICAL at 08:12

## 2019-12-14 RX ADMIN — ALBUTEROL SULFATE 2.5 MG: 2.5 SOLUTION RESPIRATORY (INHALATION) at 03:12

## 2019-12-14 RX ADMIN — CLINDAMYCIN IN 5 PERCENT DEXTROSE 900 MG: 18 INJECTION, SOLUTION INTRAVENOUS at 03:12

## 2019-12-14 RX ADMIN — SODIUM CHLORIDE 1000 ML: 0.9 INJECTION, SOLUTION INTRAVENOUS at 03:12

## 2019-12-14 RX ADMIN — ACETAMINOPHEN 1000 MG: 500 TABLET ORAL at 03:12

## 2019-12-14 RX ADMIN — OXYCODONE HYDROCHLORIDE AND ACETAMINOPHEN 1000 MG: 500 TABLET ORAL at 08:12

## 2019-12-14 RX ADMIN — VANCOMYCIN HYDROCHLORIDE 2000 MG: 1 INJECTION, POWDER, LYOPHILIZED, FOR SOLUTION INTRAVENOUS at 08:12

## 2019-12-14 RX ADMIN — GABAPENTIN 800 MG: 400 CAPSULE ORAL at 08:12

## 2019-12-14 NOTE — SUBJECTIVE & OBJECTIVE
Past Medical History:   Diagnosis Date    *Atrial flutter     Angina pectoris 2017    Anxiety     Arthritis     Asthma     Atrial fibrillation     Back pain     Cataract     OD    CHF (congestive heart failure)     COPD (chronic obstructive pulmonary disease)     Depression     Diabetes mellitus     Emphysema of lung     Heart failure     Hepatomegaly 2/3/2016    Hernia     History of MI (myocardial infarction) 2016    Hypercapnic respiratory failure, chronic 2016    Hyperlipidemia     Hypertension     Iron deficiency anemia 2/3/2016    Myocardial infarction     Obesity     Peripheral vascular disease     Pneumonia     Polyneuropathy     Retinal detachment     OS    Septic shock 2017    Skin ulcer 3/18/2017    Tobacco dependence     Type II 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, not stated as uncontrolled(250.60)        Past Surgical History:   Procedure Laterality Date    BREAST BIOPSY Left 10 yrs ago    benign    CATARACT EXTRACTION Left     OS      SECTION      x2    EYE SURGERY      HYSTERECTOMY      partial    OOPHORECTOMY      one ovary conserved    RETINAL DETACHMENT SURGERY      buckle --OS    TONSILLECTOMY         Review of patient's allergies indicates:   Allergen Reactions    Dilaudid [hydromorphone] Anaphylaxis     Other reaction(s): Anaphylaxis  Other reaction(s): Unknown    Zyvox [linezolid in dextrose 5%] Shortness Of Breath       No current facility-administered medications on file prior to encounter.      Current Outpatient Medications on File Prior to Encounter   Medication Sig    albuterol (ACCUNEB) 0.63 mg/3 mL Nebu Take 3 mLs (0.63 mg total) by nebulization every 6 (six) hours as needed. Rescue    albuterol (PROVENTIL/VENTOLIN HFA) 90 mcg/actuation inhaler INHALE 2 PUFFS INTO THE LUNGS EVERY 6 (SIX) HOURS AS NEEDED FOR RESCUE    apixaban (ELIQUIS) 5 mg Tab Take 1 tablet (5 mg total) by mouth  2 (two) times daily.    ascorbic acid, vitamin C, (VITAMIN C) 500 MG tablet Take 2 tablets (1,000 mg total) by mouth every evening.    atorvastatin (LIPITOR) 20 MG tablet Take 1 tablet (20 mg total) by mouth once daily.    blood-glucose meter (TRUE METRIX AIR GLUCOSE METER) kit AC meals. Insulin dependent.Dispence strips and lancets 3 months.    BREO ELLIPTA 100-25 mcg/dose diskus inhaler INHALE 1 PUFF INTO THE LUNGS ONCE DAILY.    budesonide (PULMICORT) 0.5 mg/2 mL nebulizer solution     cefadroxil (DURICEF) 500 MG Cap Take 1 capsule (500 mg total) by mouth every 12 (twelve) hours.    clonazePAM (KLONOPIN) 0.5 MG tablet Take 1 tablet (0.5 mg total) by mouth 2 (two) times daily. TAKE ONE TABLET BY MOUTH TWO TIMES A DAY AS NEEDED. Medically necessary    DULoxetine (CYMBALTA) 30 MG capsule TAKE 1 CAPSULE ONCE DAILY    ferrous sulfate (FEOSOL) 325 mg (65 mg iron) Tab tablet Take 1 tablet (325 mg total) by mouth 2 (two) times daily.    fluticasone propionate (FLONASE) 50 mcg/actuation nasal spray 1 spray (50 mcg total) by Each Nostril route once daily.    furosemide (LASIX) 40 MG tablet Take 0.5 tablets (20 mg total) by mouth 3 (three) times a week.    gabapentin (NEURONTIN) 800 MG tablet Take 1 tablet (800 mg total) by mouth 3 (three) times daily.    HYDROcodone-acetaminophen (NORCO)  mg per tablet Take 1 tablet by mouth every 12 (twelve) hours as needed for Pain. Medical necessary    lancets Misc To check BG 3 times daily, to use with insurance preferred meter.    levalbuterol (XOPENEX) 0.63 mg/3 mL nebulizer solution INHALE THE CONTENTS OF 1 VIAL BY NEBULIZATION 4 times  DAILY.    DX: J43.9    lisinopril (PRINIVIL,ZESTRIL) 20 MG tablet TAKE ONE TABLET BY MOUTH EVERY DAY    loratadine (CLARITIN) 10 mg tablet Take 1 tablet (10 mg total) by mouth once daily.    metFORMIN (GLUCOPHAGE) 500 MG tablet TAKE 1 TABLET (500 MG TOTAL) BY MOUTH 2 (TWO) TIMES DAILY WITH MEALS.    multivitamin (THERAGRAN)  tablet Take 1 tablet by mouth once daily.    nystatin (NYSTOP) powder Apply topically 2 (two) times daily.    nystatin-triamcinolone (MYCOLOG II) cream Apply topically 3 (three) times daily.    omega-3 acid ethyl esters (LOVAZA) 1 gram capsule 2 (two) times daily.     omeprazole (PRILOSEC) 40 MG capsule Take 1 capsule (40 mg total) by mouth once daily.    ondansetron (ZOFRAN-ODT) 8 MG TbDL Take 1 tablet (8 mg total) by mouth every 6 (six) hours as needed.    polyethylene glycol (GLYCOLAX) 17 gram/dose powder     potassium chloride SA (K-DUR,KLOR-CON) 20 MEQ tablet TAKE ONE TABLET BY MOUTH EVERY DAY    senna-docusate 8.6-50 mg (PERICOLACE) 8.6-50 mg per tablet Take 1 tablet by mouth 2 (two) times daily as needed for Constipation.    traZODone (DESYREL) 100 MG tablet TAKE 1 & 1/2 TABLET BY MOUTH EVERY EVENING    TRUE METRIX GLUCOSE TEST STRIP Strp TEST BLOOD SUGAR THREE TIMES A DAY      Family History     Problem Relation (Age of Onset)    Alcohol abuse Mother    Arthritis Father, Sister    Blindness Son    Cancer Brother    Crohn's disease Sister    Early death Sister (30)    Heart disease Father, Sister (32), Sister    No Known Problems Daughter        Tobacco Use    Smoking status: Former Smoker     Packs/day: 0.25     Years: 50.00     Pack years: 12.50     Types: Cigarettes     Start date: 1968     Last attempt to quit: 2019     Years since quittin.3    Smokeless tobacco: Never Used   Substance and Sexual Activity    Alcohol use: No     Alcohol/week: 0.0 standard drinks     Frequency: Never    Drug use: No    Sexual activity: Not Currently     Review of Systems   Constitutional: Positive for chills and fever.   HENT: Positive for congestion.    Respiratory: Positive for cough. Negative for shortness of breath.    Cardiovascular: Negative for chest pain.   Gastrointestinal: Negative for constipation, diarrhea, nausea and vomiting.   Genitourinary: Negative for dysuria.   Skin: Positive  for rash.   Neurological: Negative for syncope.     Objective:     Vital Signs (Most Recent):  Temp: (!) 102.4 °F (39.1 °C) (12/14/19 1704)  Pulse: 95 (12/14/19 1701)  Resp: 20 (12/14/19 1548)  BP: 137/62 (12/14/19 1701)  SpO2: 96 % (12/14/19 1701) Vital Signs (24h Range):  Temp:  [101.5 °F (38.6 °C)-102.4 °F (39.1 °C)] 102.4 °F (39.1 °C)  Pulse:  [] 95  Resp:  [20] 20  SpO2:  [95 %-99 %] 96 %  BP: (115-145)/(53-77) 137/62     Weight: 100.2 kg (221 lb)  Body mass index is 33.6 kg/m².    Physical Exam   Constitutional: She appears well-developed and well-nourished.   HENT:   Head: Normocephalic and atraumatic.   Right Ear: External ear normal.   Left Ear: External ear normal.   Mouth/Throat: Oropharynx is clear and moist.   Eyes: Conjunctivae are normal.   Neck: Normal range of motion. Neck supple.   Cardiovascular: Normal rate, regular rhythm and normal heart sounds.   Pulmonary/Chest: Effort normal.   Rhonchi heard from upper airway.    Abdominal: Soft. Bowel sounds are normal.   Large pannus noted   Musculoskeletal:        Right ankle: She exhibits no swelling.        Left ankle: She exhibits no swelling.   Neurological: She is alert.   Skin: Skin is warm.        Psychiatric: She has a normal mood and affect.   Vitals reviewed.          Significant Labs:   Recent Lab Results       12/14/19  1622   12/14/19  1548   12/14/19  1531        Influenza A, Molecular     Negative     Influenza B, Molecular     Negative     Albumin   4.0       Alkaline Phosphatase   105       ALT   31       Anion Gap   11       Appearance, UA Clear         AST   29       Bacteria, UA Rare         Baso #   0.05       Basophil%   0.2       Bilirubin (UA) Negative         BILIRUBIN TOTAL   0.7  Comment:  For infants and newborns, interpretation of results should be based  on gestational age, weight and in agreement with clinical  observations.  Premature Infant recommended reference ranges:  Up to 24 hours.............<8.0 mg/dL  Up to  48 hours............<12.0 mg/dL  3-5 days..................<15.0 mg/dL  6-29 days.................<15.0 mg/dL         BNP   172  Comment:  Values of less than 100 pg/ml are consistent with non-CHF populations.       BUN, Bld   14       Calcium   9.7       Chloride   98       CO2   26       Color, UA Yellow         Creatinine   0.7       Differential Method   Automated       eGFR if    >60       eGFR if non    >60  Comment:  Calculation used to obtain the estimated glomerular filtration  rate (eGFR) is the CKD-EPI equation.          Eos #   0.1       Eosinophil%   0.2       Flu A & B Source     Nasal swab     Glucose   76       Glucose, UA Negative         Gran # (ANC)   17.1       Gran%   85.1       Hematocrit   41.3       Hemoglobin   12.2       Immature Grans (Abs)   0.11  Comment:  Mild elevation in immature granulocytes is non specific and   can be seen in a variety of conditions including stress response,   acute inflammation, trauma and pregnancy. Correlation with other   laboratory and clinical findings is essential.         Ketones, UA Trace         Lactate, [place]   1.8  Comment:  Falsely low lactic acid results can be found in samples   containing >=13.0 mg/dL total bilirubin and/or >=3.5 mg/dL   direct bilirubin.         Leukocytes, UA 1+         Lymph #   1.0       Lymph%   5.1       MCH   25.8       MCHC   29.5       MCV   88       Microscopic Comment SEE COMMENT  Comment:  Other formed elements not mentioned in the report are not   present in the microscopic examination.            Mono #   1.8       Mono%   8.9       MPV   10.0       NITRITE UA Negative         nRBC   0       Occult Blood UA Negative         pH, UA 7.0         Platelets   211       Potassium   5.1       PROTEIN TOTAL   7.5       Protein, UA Negative  Comment:  Recommend a 24 hour urine protein or a urine   protein/creatinine ratio if globulin induced proteinuria is  clinically suspected.           RBC    4.72       RBC, UA 1         RDW   15.0       Sodium   135       Specific [place], UA 1.010         Specimen UA Urine, Clean Catch         Squam Epithel, UA 3         UROBILINOGEN UA Negative         WBC, UA 7         WBC   20.10             Significant Imaging: I have reviewed all pertinent imaging results/findings within the past 24 hours.

## 2019-12-14 NOTE — ED PROVIDER NOTES
Encounter Date: 2019    SCRIBE #1 NOTE: Marie WATTERS, ryan scribing for, and in the presence of, Geovanny Weinstein MD.       History     Chief Complaint   Patient presents with    Recurrent Skin Infections     of abdomen       Time seen by provider: 3:18 PM on 2019    Nelly Tian is a 69 y.o. female with Hx of recurrent skin infections who presents to the ED with c/o left-sided abdominal pain from cellulitis since this morning. Pt c/o chills, tremors, cough for 2 weeks, and congestion. She has a temp of 101.5 °F at time of arrival. The patient denies N/V/D, urinary symptoms, or any other symptoms at this time. Pt reports of smoking . PMHx of DM, COPD, HTN, CHF, and septic shock. PSHx includes , oophorectomy, and hysterectomy.        The history is provided by the patient.     Review of patient's allergies indicates:   Allergen Reactions    Dilaudid [hydromorphone] Anaphylaxis     Other reaction(s): Anaphylaxis  Other reaction(s): Unknown    Zyvox [linezolid in dextrose 5%] Shortness Of Breath     Past Medical History:   Diagnosis Date    *Atrial flutter     Angina pectoris 2017    Anxiety     Arthritis     Asthma     Atrial fibrillation     Back pain     Cataract     OD    CHF (congestive heart failure)     COPD (chronic obstructive pulmonary disease)     Depression     Diabetes mellitus     Emphysema of lung     Heart failure     Hepatomegaly 2/3/2016    Hernia     History of MI (myocardial infarction) 2016    Hypercapnic respiratory failure, chronic 2016    Hyperlipidemia     Hypertension     Iron deficiency anemia 2/3/2016    Myocardial infarction     Obesity     Peripheral vascular disease     Pneumonia     Polyneuropathy     Retinal detachment     OS    Septic shock 2017    Skin ulcer 3/18/2017    Tobacco dependence     Type II 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, not stated as  uncontrolled(250.60)      Past Surgical History:   Procedure Laterality Date    BREAST BIOPSY Left 10 yrs ago    benign    CATARACT EXTRACTION Left     OS      SECTION      x2    EYE SURGERY      HYSTERECTOMY      partial    OOPHORECTOMY      one ovary conserved    RETINAL DETACHMENT SURGERY      buckle --OS    TONSILLECTOMY       Family History   Problem Relation Age of Onset    Arthritis Father     Heart disease Father     Early death Sister 30        heart disease    Heart disease Sister 32        MI    Cancer Brother         Lung cancer    No Known Problems Daughter     Blindness Son         due to accident//    Arthritis Sister     Heart disease Sister     Crohn's disease Sister     Alcohol abuse Mother     Breast cancer Neg Hx     Glaucoma Neg Hx     Macular degeneration Neg Hx     Retinal detachment Neg Hx     Amblyopia Neg Hx     Diabetes Neg Hx     Cataracts Neg Hx     Hypertension Neg Hx     Strabismus Neg Hx     Stroke Neg Hx     Thyroid disease Neg Hx      Social History     Tobacco Use    Smoking status: Former Smoker     Packs/day: 0.25     Years: 50.00     Pack years: 12.50     Types: Cigarettes     Start date: 1968     Last attempt to quit: 2019     Years since quittin.3    Smokeless tobacco: Never Used   Substance Use Topics    Alcohol use: No     Alcohol/week: 0.0 standard drinks     Frequency: Never    Drug use: No     Review of Systems   Constitutional: Positive for chills and fever.   HENT: Positive for congestion.    Eyes: Negative for visual disturbance.   Respiratory: Positive for cough.    Cardiovascular: Negative for leg swelling.   Gastrointestinal: Positive for abdominal pain. Negative for diarrhea, nausea and vomiting.   Genitourinary: Negative for difficulty urinating and dysuria.   Musculoskeletal: Negative for back pain.   Skin: Negative for pallor.   Neurological: Positive for tremors. Negative for headaches.   Hematological:  Does not bruise/bleed easily.       Physical Exam     Initial Vitals   BP Pulse Resp Temp SpO2   12/14/19 1508 12/14/19 1505 12/14/19 1505 12/14/19 1505 12/14/19 1505   115/77 (!) 115 20 (!) 101.5 °F (38.6 °C) 96 %      MAP       --                Physical Exam    Nursing note and vitals reviewed.  Constitutional: She appears well-developed.   Shivering.   HENT:   Head: Normocephalic and atraumatic.   Mouth/Throat: Mucous membranes are not dry.   Dry tongue.   Eyes: EOM are normal. Pupils are equal, round, and reactive to light.   Neck: Neck supple.   Cardiovascular: Normal rate, regular rhythm, normal heart sounds and intact distal pulses. Exam reveals no gallop and no friction rub.    No murmur heard.  Pulmonary/Chest: No respiratory distress. She has decreased breath sounds (at the right base). She has no wheezes. She has no rhonchi. She has no rales.   Abdominal: Soft. Bowel sounds are normal. She exhibits no distension. There is no tenderness.   Musculoskeletal: Normal range of motion.   Neurological: She is alert and oriented to person, place, and time.   Skin: Skin is warm and dry. Rash noted. There is erythema.   Left-sided abdomen: inflamed, red, warm, and tender.  Nystatin cream in the skin folds.  Bilateral LE: non-blanching, erythema.  Vascular rash on the right leg>left leg.   Psychiatric: She has a normal mood and affect.         ED Course   Critical Care  Date/Time: 12/14/2019 4:43 PM  Performed by: Geovanny Weinstein MD  Authorized by: Geovanny Weinstein MD   Direct patient critical care time: 20 minutes  Additional history critical care time: 5 minutes  Ordering / reviewing critical care time: 5 minutes  Documentation critical care time: 5 minutes  Consulting other physicians critical care time: 5 minutes  Consult with family critical care time: 5 minutes  Total critical care time (exclusive of procedural time) : 45 minutes  Critical care was necessary to treat or prevent imminent or life-threatening  deterioration of the following conditions: sepsis.  Critical care was time spent personally by me on the following activities: discussions with consultants, examination of patient, ordering and performing treatments and interventions, ordering and review of laboratory studies, ordering and review of radiographic studies, pulse oximetry and re-evaluation of patient's condition.        Labs Reviewed - No data to display       Imaging Results    None          Medical Decision Making:   History:   Old Medical Records: I decided to obtain old medical records.  Clinical Tests:   Lab Tests: Ordered and Reviewed  Radiological Study: Ordered and Reviewed            Scribe Attestation:   Scribe #1: I performed the above scribed service and the documentation accurately describes the services I performed. I attest to the accuracy of the note.    I, Dr. Geovanny Weinstein personally performed the services described in this documentation. All medical record entries made by the scribe were at my direction and in my presence.  I have reviewed the chart and agree that the record reflects my personal performance and is accurate and complete. Geovanny Weinstein MD.  4:43 PM 12/14/2019    DISCLAIMER: This note was prepared with Dragon NaturallySpeaking voice recognition transcription software. Garbled syntax, mangled pronouns, and other bizarre constructions may be attributed to that software system         ED Course as of Dec 14 1643   Sat Dec 14, 2019   1637 Calcium: 9.7 [JS]   1642 Patient appears to be developing sepsis from cellulitisAnd panniculitis.  Thankfully her chest x-ray does not show a focal infiltrate.  I spoke with the hospitalist, Dr. Mcarthur, who will admit the patient.  She has been given vancomycin and clindamycin after cultures here in the emergency room.    [JS]      ED Course User Index  [JS] Geovanny Weinstein MD                Clinical Impression:       ICD-10-CM ICD-9-CM   1. Sepsis due to cellulitis L03.90 682.9     A41.9 995.91   2. Panniculitis M79.3 729.30                             Geovanny Weinstein MD  12/14/19 9674

## 2019-12-14 NOTE — H&P
"[facility] Medicine  History & Physical    Patient Name: Nelly Tian  MRN: [id number]  Admission Date: 2019  Attending Physician: Geovanny Weinstein MD   Primary Care Provider: Constantine Bird MD         Patient information was obtained from patient and ER records.     Subjective:     Principal Problem:Cellulitis of abdominal wall    Chief Complaint:   Chief Complaint   Patient presents with    Recurrent Skin Infections     of abdomen        HPI: H & P as per ER:  "Nelyl Tian is a 69 y.o. female with Hx of recurrent skin infections who presents to the ED with c/o left-sided abdominal pain from cellulitis since this morning. Pt c/o chills, tremors, cough for 2 weeks, and congestion. She has a temp of 101.5 °F at time of arrival. The patient denies N/V/D, urinary symptoms, or any other symptoms at this time. Pt reports of smoking . PMHx of DM, COPD, HTN, CHF, and septic shock. PSHx includes , oophorectomy, and hysterectomy."    Patient seen in ER for complaints of shakes and fever. Started this morning. Tried taking tylenol for this but symptoms didn't improve. Was recently seen by her PCP (Dr. Bird) on  for abdomnial cellulitis. Was started on DURICEF but symptoms have not improved. Also complains of a cough with yellow phlegm since getting the flu shot about 3 weeks ago. Feels congested.  While in ER WBC was elevated at 20.1. Lactate was within normal limits. CXR showed no acute findings.     Past Medical History:   Diagnosis Date    *Atrial flutter     Angina pectoris 2017    Anxiety     Arthritis     Asthma     Atrial fibrillation     Back pain     Cataract     OD    CHF (congestive heart failure)     COPD (chronic obstructive pulmonary disease)     Depression     Diabetes mellitus     Emphysema of lung     Heart failure     Hepatomegaly 2/3/2016    Hernia     History of MI (myocardial infarction) 2016    Hypercapnic " respiratory failure, chronic 2016    Hyperlipidemia     Hypertension     Iron deficiency anemia 2/3/2016    Myocardial infarction     Obesity     Peripheral vascular disease     Pneumonia     Polyneuropathy     Retinal detachment     OS    Septic shock 2017    Skin ulcer 3/18/2017    Tobacco dependence     Type II 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, not stated as uncontrolled(250.60)        Past Surgical History:   Procedure Laterality Date    BREAST BIOPSY Left 10 yrs ago    benign    CATARACT EXTRACTION Left     OS      SECTION      x2    EYE SURGERY      HYSTERECTOMY      partial    OOPHORECTOMY      one ovary conserved    RETINAL DETACHMENT SURGERY      buckle --OS    TONSILLECTOMY         Review of patient's allergies indicates:   Allergen Reactions    Dilaudid [hydromorphone] Anaphylaxis     Other reaction(s): Anaphylaxis  Other reaction(s): Unknown    Zyvox [linezolid in dextrose 5%] Shortness Of Breath       No current facility-administered medications on file prior to encounter.      Current Outpatient Medications on File Prior to Encounter   Medication Sig    albuterol (ACCUNEB) 0.63 mg/3 mL Nebu Take 3 mLs (0.63 mg total) by nebulization every 6 (six) hours as needed. Rescue    albuterol (PROVENTIL/VENTOLIN HFA) 90 mcg/actuation inhaler INHALE 2 PUFFS INTO THE LUNGS EVERY 6 (SIX) HOURS AS NEEDED FOR RESCUE    apixaban (ELIQUIS) 5 mg Tab Take 1 tablet (5 mg total) by mouth 2 (two) times daily.    ascorbic acid, vitamin C, (VITAMIN C) 500 MG tablet Take 2 tablets (1,000 mg total) by mouth every evening.    atorvastatin (LIPITOR) 20 MG tablet Take 1 tablet (20 mg total) by mouth once daily.    blood-glucose meter (TRUE METRIX AIR GLUCOSE METER) kit AC meals. Insulin dependent.Dispence strips and lancets 3 months.    BREO ELLIPTA 100-25 mcg/dose diskus inhaler INHALE 1 PUFF INTO THE LUNGS ONCE DAILY.    budesonide  (PULMICORT) 0.5 mg/2 mL nebulizer solution     cefadroxil (DURICEF) 500 MG Cap Take 1 capsule (500 mg total) by mouth every 12 (twelve) hours.    clonazePAM (KLONOPIN) 0.5 MG tablet Take 1 tablet (0.5 mg total) by mouth 2 (two) times daily. TAKE ONE TABLET BY MOUTH TWO TIMES A DAY AS NEEDED. Medically necessary    DULoxetine (CYMBALTA) 30 MG capsule TAKE 1 CAPSULE ONCE DAILY    ferrous sulfate (FEOSOL) 325 mg (65 mg iron) Tab tablet Take 1 tablet (325 mg total) by mouth 2 (two) times daily.    fluticasone propionate (FLONASE) 50 mcg/actuation nasal spray 1 spray (50 mcg total) by Each Nostril route once daily.    furosemide (LASIX) 40 MG tablet Take 0.5 tablets (20 mg total) by mouth 3 (three) times a week.    gabapentin (NEURONTIN) 800 MG tablet Take 1 tablet (800 mg total) by mouth 3 (three) times daily.    HYDROcodone-acetaminophen (NORCO)  mg per tablet Take 1 tablet by mouth every 12 (twelve) hours as needed for Pain. Medical necessary    lancets Misc To check BG 3 times daily, to use with insurance preferred meter.    levalbuterol (XOPENEX) 0.63 mg/3 mL nebulizer solution INHALE THE CONTENTS OF 1 VIAL BY NEBULIZATION 4 times  DAILY.    DX: J43.9    lisinopril (PRINIVIL,ZESTRIL) 20 MG tablet TAKE ONE TABLET BY MOUTH EVERY DAY    loratadine (CLARITIN) 10 mg tablet Take 1 tablet (10 mg total) by mouth once daily.    metFORMIN (GLUCOPHAGE) 500 MG tablet TAKE 1 TABLET (500 MG TOTAL) BY MOUTH 2 (TWO) TIMES DAILY WITH MEALS.    multivitamin (THERAGRAN) tablet Take 1 tablet by mouth once daily.    nystatin (NYSTOP) powder Apply topically 2 (two) times daily.    nystatin-triamcinolone (MYCOLOG II) cream Apply topically 3 (three) times daily.    omega-3 acid ethyl esters (LOVAZA) 1 gram capsule 2 (two) times daily.     omeprazole (PRILOSEC) 40 MG capsule Take 1 capsule (40 mg total) by mouth once daily.    ondansetron (ZOFRAN-ODT) 8 MG TbDL Take 1 tablet (8 mg total) by mouth every 6 (six) hours  as needed.    polyethylene glycol (GLYCOLAX) 17 gram/dose powder     potassium chloride SA (K-DUR,KLOR-CON) 20 MEQ tablet TAKE ONE TABLET BY MOUTH EVERY DAY    senna-docusate 8.6-50 mg (PERICOLACE) 8.6-50 mg per tablet Take 1 tablet by mouth 2 (two) times daily as needed for Constipation.    traZODone (DESYREL) 100 MG tablet TAKE 1 & 1/2 TABLET BY MOUTH EVERY EVENING    TRUE METRIX GLUCOSE TEST STRIP Strp TEST BLOOD SUGAR THREE TIMES A DAY      Family History     Problem Relation (Age of Onset)    Alcohol abuse Mother    Arthritis Father, Sister    Blindness Son    Cancer Brother    Crohn's disease Sister    Early death Sister (30)    Heart disease Father, Sister (32), Sister    No Known Problems Daughter        Tobacco Use    Smoking status: Former Smoker     Packs/day: 0.25     Years: 50.00     Pack years: 12.50     Types: Cigarettes     Start date: 1968     Last attempt to quit: 2019     Years since quittin.3    Smokeless tobacco: Never Used   Substance and Sexual Activity    Alcohol use: No     Alcohol/week: 0.0 standard drinks     Frequency: Never    Drug use: No    Sexual activity: Not Currently     Review of Systems   Constitutional: Positive for chills and fever.   HENT: Positive for congestion.    Respiratory: Positive for cough. Negative for shortness of breath.    Cardiovascular: Negative for chest pain.   Gastrointestinal: Negative for constipation, diarrhea, nausea and vomiting.   Genitourinary: Negative for dysuria.   Skin: Positive for rash.   Neurological: Negative for syncope.     Objective:     Vital Signs (Most Recent):  Temp: (!) 102.4 °F (39.1 °C) (19 1704)  Pulse: 95 (19 1701)  Resp: 20 (19 1548)  BP: 137/62 (19 1701)  SpO2: 96 % (19 1701) Vital Signs (24h Range):  Temp:  [101.5 °F (38.6 °C)-102.4 °F (39.1 °C)] 102.4 °F (39.1 °C)  Pulse:  [] 95  Resp:  [20] 20  SpO2:  [95 %-99 %] 96 %  BP: (115-145)/(53-77) 137/62     Weight: 100.2 kg  (221 lb)  Body mass index is 33.6 kg/m².    Physical Exam   Constitutional: She appears well-developed and well-nourished.   HENT:   Head: Normocephalic and atraumatic.   Right Ear: External ear normal.   Left Ear: External ear normal.   Mouth/Throat: Oropharynx is clear and moist.   Eyes: Conjunctivae are normal.   Neck: Normal range of motion. Neck supple.   Cardiovascular: Normal rate, regular rhythm and normal heart sounds.   Pulmonary/Chest: Effort normal.   Rhonchi heard from upper airway.    Abdominal: Soft. Bowel sounds are normal.   Large pannus noted   Musculoskeletal:        Right ankle: She exhibits no swelling.        Left ankle: She exhibits no swelling.   Neurological: She is alert.   Skin: Skin is warm.        Psychiatric: She has a normal mood and affect.   Vitals reviewed.          Significant Labs:   Recent Lab Results       12/14/19  1622   12/14/19  1548   12/14/19  1531        Influenza A, Molecular     Negative     Influenza B, Molecular     Negative     Albumin   4.0       Alkaline Phosphatase   105       ALT   31       Anion Gap   11       Appearance, UA Clear         AST   29       Bacteria, UA Rare         Baso #   0.05       Basophil%   0.2       Bilirubin (UA) Negative         BILIRUBIN TOTAL   0.7  Comment:  For infants and newborns, interpretation of results should be based  on gestational age, weight and in agreement with clinical  observations.  Premature Infant recommended reference ranges:  Up to 24 hours.............<8.0 mg/dL  Up to 48 hours............<12.0 mg/dL  3-5 days..................<15.0 mg/dL  6-29 days.................<15.0 mg/dL         BNP   172  Comment:  Values of less than 100 pg/ml are consistent with non-CHF populations.       BUN, Bld   14       Calcium   9.7       Chloride   98       CO2   26       Color, UA Yellow         Creatinine   0.7       Differential Method   Automated       eGFR if    >60       eGFR if non     >60  Comment:  Calculation used to obtain the estimated glomerular filtration  rate (eGFR) is the CKD-EPI equation.          Eos #   0.1       Eosinophil%   0.2       Flu A & B Source     Nasal swab     Glucose   76       Glucose, UA Negative         Gran # (ANC)   17.1       Gran%   85.1       Hematocrit   41.3       Hemoglobin   12.2       Immature Grans (Abs)   0.11  Comment:  Mild elevation in immature granulocytes is non specific and   can be seen in a variety of conditions including stress response,   acute inflammation, trauma and pregnancy. Correlation with other   laboratory and clinical findings is essential.         Ketones, UA Trace         Lactate, [place]   1.8  Comment:  Falsely low lactic acid results can be found in samples   containing >=13.0 mg/dL total bilirubin and/or >=3.5 mg/dL   direct bilirubin.         Leukocytes, UA 1+         Lymph #   1.0       Lymph%   5.1       MCH   25.8       MCHC   29.5       MCV   88       Microscopic Comment SEE COMMENT  Comment:  Other formed elements not mentioned in the report are not   present in the microscopic examination.            Mono #   1.8       Mono%   8.9       MPV   10.0       NITRITE UA Negative         nRBC   0       Occult Blood UA Negative         pH, UA 7.0         Platelets   211       Potassium   5.1       PROTEIN TOTAL   7.5       Protein, UA Negative  Comment:  Recommend a 24 hour urine protein or a urine   protein/creatinine ratio if globulin induced proteinuria is  clinically suspected.           RBC   4.72       RBC, UA 1         RDW   15.0       Sodium   135       Specific [place], UA 1.010         Specimen UA Urine, Clean Catch         Squam Epithel, UA 3         UROBILINOGEN UA Negative         WBC, UA 7         WBC   20.10             Significant Imaging: I have reviewed all pertinent imaging results/findings within the past 24 hours.    Assessment/Plan:     * Cellulitis of abdominal wall  Continue IV vanc and clinda  ID  consulted    CHF (congestive heart failure)  Continue home med of toprol    COPD (chronic obstructive pulmonary disease)  Continue home med of breo and nebs prn  Coughing up phlegm and some rhonchi heard  CXR looked ok.   Will give z pack  F/u procal to see if there's any elevation    DDD (degenerative disc disease), lumbar  Continue home med of norco      Hypertension associated with diabetes  Monitor blood pressure.  Continue home med of lisinopril    Chronic atrial fibrillation  Continue home med of eliquis  Place on tele    GERD (gastroesophageal reflux disease)  Will place on PPI      Major depression, recurrent, chronic  Continue cymbalta      Diabetes mellitus with neuropathy  Continue home med gabapentin and cymbalta  Sliding scale insulin      VTE Risk Mitigation (From admission, onward)    None             Sharmila Mcarthur MD  Department of Hospital Medicine   [facility]

## 2019-12-14 NOTE — ASSESSMENT & PLAN NOTE
Continue home med of breo and nebs prn  Coughing up phlegm and some rhonchi heard  CXR looked ok.   Will give z pack  F/u procal to see if there's any elevation

## 2019-12-14 NOTE — ED NOTES
Assumed care:  Nelly Tian is awake, alert and oriented x 3, skin warm and dry, in NAD.  Patient arrived via EMS for abd pain and redness.  Patient has history of abd cellulitis.  Patient with fever, cough, and congestion.  Patient placed on cardiac monitoring and continuous pulse ox.    Patient identifiers for Nelly Tian checked and correct.  LOC:  Nelly Tian is awake, alert, and aware of environment with an appropriate affect. She is oriented x 3 and speaking appropriately.  APPEARANCE:  She is resting comfortably and in no acute distress. She is clean and well groomed, patient's clothing is properly fastened.  SKIN:  The skin is hot and dry. She has normal skin turgor and moist mucus membranes. Redness to left lower abd.  MUSCULOSKELETAL:  She is moving all extremities well, no obvious deformities noted. Pulses intact.   RESPIRATORY:  Airway is open and patent. Respirations are spontaneous and non-labored with normal effort and rate.  cough  CARDIAC:  She has a normal rate and rhythm. No peripheral edema noted. Capillary refill < 3 seconds.  ABDOMEN:  No distention noted.  Soft and non-tender upon palpation.  Hernia, obese  NEUROLOGICAL:  PERRL. Facial expression is symmetrical. Hand grasps are equal bilaterally. Normal sensation in all extremities when touched with finger.  Allergies reported:    Review of patient's allergies indicates:   Allergen Reactions    Dilaudid [hydromorphone] Anaphylaxis     Other reaction(s): Anaphylaxis  Other reaction(s): Unknown    Zyvox [linezolid in dextrose 5%] Shortness Of Breath     OTHER NOTES:

## 2019-12-15 ENCOUNTER — OUTSIDE PLACE OF SERVICE (OUTPATIENT)
Dept: INFECTIOUS DISEASES | Facility: CLINIC | Age: 69
End: 2019-12-15
Payer: MEDICARE

## 2019-12-15 LAB
ALBUMIN SERPL BCP-MCNC: 3.1 G/DL (ref 3.5–5.2)
ALP SERPL-CCNC: 87 U/L (ref 55–135)
ALT SERPL W/O P-5'-P-CCNC: 25 U/L (ref 10–44)
ANION GAP SERPL CALC-SCNC: 7 MMOL/L (ref 8–16)
AST SERPL-CCNC: 18 U/L (ref 10–40)
BASOPHILS # BLD AUTO: 0.05 K/UL (ref 0–0.2)
BASOPHILS NFR BLD: 0.3 % (ref 0–1.9)
BILIRUB SERPL-MCNC: 0.4 MG/DL (ref 0.1–1)
BUN SERPL-MCNC: 12 MG/DL (ref 8–23)
CALCIUM SERPL-MCNC: 8.7 MG/DL (ref 8.7–10.5)
CHLORIDE SERPL-SCNC: 101 MMOL/L (ref 95–110)
CO2 SERPL-SCNC: 28 MMOL/L (ref 23–29)
CREAT SERPL-MCNC: 0.7 MG/DL (ref 0.5–1.4)
DIFFERENTIAL METHOD: ABNORMAL
EOSINOPHIL # BLD AUTO: 0 K/UL (ref 0–0.5)
EOSINOPHIL NFR BLD: 0.1 % (ref 0–8)
ERYTHROCYTE [DISTWIDTH] IN BLOOD BY AUTOMATED COUNT: 15.1 % (ref 11.5–14.5)
EST. GFR  (AFRICAN AMERICAN): >60 ML/MIN/1.73 M^2
EST. GFR  (NON AFRICAN AMERICAN): >60 ML/MIN/1.73 M^2
ESTIMATED AVG GLUCOSE: 105 MG/DL (ref 68–131)
GLUCOSE SERPL-MCNC: 76 MG/DL (ref 70–110)
HBA1C MFR BLD HPLC: 5.3 % (ref 4–5.6)
HCT VFR BLD AUTO: 37.8 % (ref 37–48.5)
HGB BLD-MCNC: 11.2 G/DL (ref 12–16)
IMM GRANULOCYTES # BLD AUTO: 0.06 K/UL (ref 0–0.04)
LYMPHOCYTES # BLD AUTO: 1.7 K/UL (ref 1–4.8)
LYMPHOCYTES NFR BLD: 9.6 % (ref 18–48)
MCH RBC QN AUTO: 26 PG (ref 27–31)
MCHC RBC AUTO-ENTMCNC: 29.6 G/DL (ref 32–36)
MCV RBC AUTO: 88 FL (ref 82–98)
MONOCYTES # BLD AUTO: 1.6 K/UL (ref 0.3–1)
MONOCYTES NFR BLD: 9.1 % (ref 4–15)
NEUTROPHILS # BLD AUTO: 14.3 K/UL (ref 1.8–7.7)
NEUTROPHILS NFR BLD: 80.6 % (ref 38–73)
NRBC BLD-RTO: 0 /100 WBC
PLATELET # BLD AUTO: 197 K/UL (ref 150–350)
PMV BLD AUTO: 10.2 FL (ref 9.2–12.9)
POCT GLUCOSE: 111 MG/DL (ref 70–110)
POCT GLUCOSE: 142 MG/DL (ref 70–110)
POCT GLUCOSE: 88 MG/DL (ref 70–110)
POCT GLUCOSE: 92 MG/DL (ref 70–110)
POTASSIUM SERPL-SCNC: 4.5 MMOL/L (ref 3.5–5.1)
PROT SERPL-MCNC: 6.2 G/DL (ref 6–8.4)
RBC # BLD AUTO: 4.31 M/UL (ref 4–5.4)
SODIUM SERPL-SCNC: 136 MMOL/L (ref 136–145)
TROPONIN I SERPL DL<=0.01 NG/ML-MCNC: <0.006 NG/ML (ref 0–0.03)
TROPONIN I SERPL DL<=0.01 NG/ML-MCNC: <0.006 NG/ML (ref 0–0.03)
WBC # BLD AUTO: 17.66 K/UL (ref 3.9–12.7)

## 2019-12-15 PROCEDURE — 87070 CULTURE OTHR SPECIMN AEROBIC: CPT | Mod: HCNC

## 2019-12-15 PROCEDURE — 84484 ASSAY OF TROPONIN QUANT: CPT | Mod: HCNC

## 2019-12-15 PROCEDURE — 87077 CULTURE AEROBIC IDENTIFY: CPT | Mod: HCNC

## 2019-12-15 PROCEDURE — 94640 AIRWAY INHALATION TREATMENT: CPT | Mod: HCNC

## 2019-12-15 PROCEDURE — 87076 CULTURE ANAEROBE IDENT EACH: CPT | Mod: HCNC

## 2019-12-15 PROCEDURE — 85025 COMPLETE CBC W/AUTO DIFF WBC: CPT | Mod: HCNC

## 2019-12-15 PROCEDURE — 93005 ELECTROCARDIOGRAM TRACING: CPT | Mod: HCNC

## 2019-12-15 PROCEDURE — 87075 CULTR BACTERIA EXCEPT BLOOD: CPT | Mod: HCNC

## 2019-12-15 PROCEDURE — 25000003 PHARM REV CODE 250: Mod: HCNC | Performed by: FAMILY MEDICINE

## 2019-12-15 PROCEDURE — 25000242 PHARM REV CODE 250 ALT 637 W/ HCPCS: Mod: HCNC | Performed by: INTERNAL MEDICINE

## 2019-12-15 PROCEDURE — 87186 SC STD MICRODIL/AGAR DIL: CPT | Mod: HCNC

## 2019-12-15 PROCEDURE — 25000242 PHARM REV CODE 250 ALT 637 W/ HCPCS: Mod: HCNC | Performed by: FAMILY MEDICINE

## 2019-12-15 PROCEDURE — 27000221 HC OXYGEN, UP TO 24 HOURS: Mod: HCNC

## 2019-12-15 PROCEDURE — 36415 COLL VENOUS BLD VENIPUNCTURE: CPT | Mod: HCNC

## 2019-12-15 PROCEDURE — 63600175 PHARM REV CODE 636 W HCPCS: Mod: HCNC | Performed by: FAMILY MEDICINE

## 2019-12-15 PROCEDURE — 80053 COMPREHEN METABOLIC PANEL: CPT | Mod: HCNC

## 2019-12-15 PROCEDURE — 25000003 PHARM REV CODE 250: Mod: HCNC | Performed by: INTERNAL MEDICINE

## 2019-12-15 PROCEDURE — 12000002 HC ACUTE/MED SURGE SEMI-PRIVATE ROOM: Mod: HCNC

## 2019-12-15 PROCEDURE — 99223 PR INITIAL HOSPITAL CARE,LEVL III: ICD-10-PCS | Mod: S$GLB,,, | Performed by: INTERNAL MEDICINE

## 2019-12-15 PROCEDURE — 99900035 HC TECH TIME PER 15 MIN (STAT): Mod: HCNC

## 2019-12-15 PROCEDURE — 99223 1ST HOSP IP/OBS HIGH 75: CPT | Mod: S$GLB,,, | Performed by: INTERNAL MEDICINE

## 2019-12-15 PROCEDURE — 94761 N-INVAS EAR/PLS OXIMETRY MLT: CPT | Mod: HCNC

## 2019-12-15 PROCEDURE — S0077 INJECTION, CLINDAMYCIN PHOSP: HCPCS | Mod: HCNC | Performed by: FAMILY MEDICINE

## 2019-12-15 RX ORDER — HYDROCODONE BITARTRATE AND ACETAMINOPHEN 10; 325 MG/1; MG/1
1 TABLET ORAL EVERY 8 HOURS PRN
Status: DISCONTINUED | OUTPATIENT
Start: 2019-12-15 | End: 2019-12-17 | Stop reason: HOSPADM

## 2019-12-15 RX ORDER — ALBUTEROL SULFATE 2.5 MG/.5ML
1.25 SOLUTION RESPIRATORY (INHALATION) EVERY 6 HOURS PRN
Status: DISCONTINUED | OUTPATIENT
Start: 2019-12-15 | End: 2019-12-15 | Stop reason: SDUPTHER

## 2019-12-15 RX ORDER — ADHESIVE BANDAGE
30 BANDAGE TOPICAL DAILY PRN
Status: DISCONTINUED | OUTPATIENT
Start: 2019-12-15 | End: 2019-12-17 | Stop reason: HOSPADM

## 2019-12-15 RX ORDER — ACETAMINOPHEN 500 MG
1000 TABLET ORAL EVERY 6 HOURS PRN
Status: DISCONTINUED | OUTPATIENT
Start: 2019-12-15 | End: 2019-12-17 | Stop reason: HOSPADM

## 2019-12-15 RX ORDER — DOXYCYCLINE HYCLATE 100 MG
100 TABLET ORAL EVERY 12 HOURS
Status: DISCONTINUED | OUTPATIENT
Start: 2019-12-15 | End: 2019-12-17 | Stop reason: HOSPADM

## 2019-12-15 RX ORDER — ALBUTEROL SULFATE 2.5 MG/.5ML
1.25 SOLUTION RESPIRATORY (INHALATION) EVERY 6 HOURS PRN
Status: DISCONTINUED | OUTPATIENT
Start: 2019-12-15 | End: 2019-12-15

## 2019-12-15 RX ORDER — ALBUTEROL SULFATE 2.5 MG/.5ML
2.5 SOLUTION RESPIRATORY (INHALATION) EVERY 4 HOURS PRN
Status: DISCONTINUED | OUTPATIENT
Start: 2019-12-15 | End: 2019-12-17 | Stop reason: HOSPADM

## 2019-12-15 RX ADMIN — DOXYCYCLINE HYCLATE 100 MG: 100 TABLET, COATED ORAL at 09:12

## 2019-12-15 RX ADMIN — CLONAZEPAM 0.5 MG: 0.5 TABLET ORAL at 12:12

## 2019-12-15 RX ADMIN — VANCOMYCIN HYDROCHLORIDE 1500 MG: 1.5 INJECTION, POWDER, LYOPHILIZED, FOR SOLUTION INTRAVENOUS at 09:12

## 2019-12-15 RX ADMIN — ALBUTEROL SULFATE 0.63 MG: 2.5 SOLUTION RESPIRATORY (INHALATION) at 10:12

## 2019-12-15 RX ADMIN — FLUTICASONE PROPIONATE 50 MCG: 50 SPRAY, METERED NASAL at 06:12

## 2019-12-15 RX ADMIN — ALBUTEROL SULFATE 2.5 MG: 2.5 SOLUTION RESPIRATORY (INHALATION) at 04:12

## 2019-12-15 RX ADMIN — ATORVASTATIN CALCIUM 20 MG: 20 TABLET, FILM COATED ORAL at 08:12

## 2019-12-15 RX ADMIN — TRAZODONE HYDROCHLORIDE 50 MG: 50 TABLET ORAL at 09:12

## 2019-12-15 RX ADMIN — GABAPENTIN 800 MG: 400 CAPSULE ORAL at 03:12

## 2019-12-15 RX ADMIN — CETIRIZINE HYDROCHLORIDE 10 MG: 10 TABLET, FILM COATED ORAL at 08:12

## 2019-12-15 RX ADMIN — PANTOPRAZOLE SODIUM 40 MG: 40 TABLET, DELAYED RELEASE ORAL at 08:12

## 2019-12-15 RX ADMIN — GABAPENTIN 800 MG: 400 CAPSULE ORAL at 09:12

## 2019-12-15 RX ADMIN — GABAPENTIN 800 MG: 400 CAPSULE ORAL at 08:12

## 2019-12-15 RX ADMIN — THERA TABS 1 TABLET: TAB at 08:12

## 2019-12-15 RX ADMIN — CLINDAMYCIN IN 5 PERCENT DEXTROSE 600 MG: 12 INJECTION, SOLUTION INTRAVENOUS at 12:12

## 2019-12-15 RX ADMIN — NYSTATIN AND TRIAMCINOLONE ACETONIDE: 100000; 1 CREAM TOPICAL at 09:12

## 2019-12-15 RX ADMIN — OXYCODONE HYDROCHLORIDE AND ACETAMINOPHEN 1000 MG: 500 TABLET ORAL at 09:12

## 2019-12-15 RX ADMIN — APIXABAN 5 MG: 2.5 TABLET, FILM COATED ORAL at 09:12

## 2019-12-15 RX ADMIN — HYDROCODONE BITARTRATE AND ACETAMINOPHEN 1 TABLET: 10; 325 TABLET ORAL at 09:12

## 2019-12-15 RX ADMIN — HYDROCODONE BITARTRATE AND ACETAMINOPHEN 1 TABLET: 10; 325 TABLET ORAL at 02:12

## 2019-12-15 RX ADMIN — DULOXETINE 30 MG: 30 CAPSULE, DELAYED RELEASE ORAL at 08:12

## 2019-12-15 RX ADMIN — APIXABAN 5 MG: 2.5 TABLET, FILM COATED ORAL at 08:12

## 2019-12-15 RX ADMIN — FLUTICASONE FUROATE AND VILANTEROL TRIFENATATE 1 PUFF: 100; 25 POWDER RESPIRATORY (INHALATION) at 10:12

## 2019-12-15 RX ADMIN — VANCOMYCIN HYDROCHLORIDE 1500 MG: 1.5 INJECTION, POWDER, LYOPHILIZED, FOR SOLUTION INTRAVENOUS at 08:12

## 2019-12-15 RX ADMIN — CLONAZEPAM 0.5 MG: 0.5 TABLET ORAL at 03:12

## 2019-12-15 RX ADMIN — NYSTATIN AND TRIAMCINOLONE ACETONIDE: 100000; 1 CREAM TOPICAL at 06:12

## 2019-12-15 NOTE — CONSULTS
Pharmacokinetic Initial Assessment: IV Vancomycin    Assessment/Plan:    Initiated intravenous vancomycin with loading dose of 2000mg once followed by a maintenance dose of vancomycin 1500 mg IV every 12 hours  Desired empiric serum trough concentration is 10 to 15 mcg/mL  Draw vancomycin trough level 30 min prior to fourth dose on 12/16 at approximately 0700.   Pharmacy will continue to follow and monitor vancomycin.      Please contact pharmacy at extension [phone] with any questions regarding this assessment.     Thank you for the consult,   Ammy Carrillo       Patient brief summary:  Nelly Tian is a 69 y.o. female initiated on antimicrobial therapy with IV Vancomycin for treatment of suspected skin & soft tissue infection    Drug Allergies:   Review of patient's allergies indicates:   Allergen Reactions    Dilaudid [hydromorphone] Anaphylaxis     Other reaction(s): Anaphylaxis  Other reaction(s): Unknown    Zyvox [linezolid in dextrose 5%] Shortness Of Breath       Actual Body Weight:   107.3 kg    Renal Function:   Estimated Creatinine Clearance: 98.7 mL/min (based on SCr of 0.7 mg/dL).,       CBC (last 72 hours):  Recent Labs   Lab Result Units 12/14/19  1548   WBC K/uL 20.10*   Hemoglobin g/dL 12.2   Hematocrit % 41.3   Platelets K/uL 211   Gran% % 85.1*   Lymph% % 5.1*   Mono% % 8.9   Eosinophil% % 0.2   Basophil% % 0.2   Differential Method  Automated       Metabolic Panel (last 72 hours):  Recent Labs   Lab Result Units 12/14/19  1548 12/14/19  1622   Sodium mmol/L 135*  --    Potassium mmol/L 5.1  --    Chloride mmol/L 98  --    CO2 mmol/L 26  --    Glucose mg/dL 76  --    Glucose, UA   --  Negative   BUN, Bld mg/dL 14  --    Creatinine mg/dL 0.7  --    Albumin g/dL 4.0  --    Total Bilirubin mg/dL 0.7  --    Alkaline Phosphatase U/L 105  --    AST U/L 29  --    ALT U/L 31  --        Drug levels (last 3 results):  No results for input(s): VANCOMYCINRA, VANCOMYCINPE, VANCOMYCINTR in the last 72  hours.    Microbiologic Results:  Microbiology Results (last 7 days)       Procedure Component Value Units Date/Time    Influenza A & B by Molecular [[id number]] Collected:  12/14/19 1531    Order Status:  Completed Specimen:  Nasopharyngeal Swab Updated:  12/14/19 1559     Influenza A, Molecular Negative     Influenza B, Molecular Negative     Flu A & B Source Nasal swab    Blood culture x two cultures. Draw prior to antibiotics. [[id number]] Collected:  12/14/19 1548    Order Status:  Sent Specimen:  Blood Updated:  12/14/19 1548    Blood culture x two cultures. Draw prior to antibiotics. [[id number]] Collected:  12/14/19 1548    Order Status:  Sent Specimen:  Blood Updated:  12/14/19 154

## 2019-12-15 NOTE — PLAN OF CARE
Pt is AAOx4.  IV saline locked, infusing abx as ordered, no redness or swelling at site.  Telemetry monitored, afib.  Pt is up with assistance to BR.  Redness to abd noted.  Pt refused SCD's.  BG monitored, SSI given prn as ordered, no SSI given thus far.  Pt ran low grade temperature throughout night.  Rest of vitals remain stable.  Pt remains free from injury.  Bed in low position, wheels locked, call light within reach.  Pt verbalized understanding of POC.  Hourly/ Q 2 hr rounding performed.

## 2019-12-15 NOTE — PROGRESS NOTES
SW met with patient to complete a discharge planning assessment. Patient presents as alert and oriented x 4, pleasant, good eye contact, well groomed, recall good, concentration/judgement good, average intelligence, calm, communicative, cooperative and asking and answering questions appropriately. SW verified all information on demographic sheet is correct. Patient reports living with daughter and that she is independent with ADLs at this time and does not drive. Patient reports daughter will transport pt home.    Patient reports does have home health. Pt reports that she was set up with [facility] and plans to return to services. Patient reports that she is not receiving outside resources. Patient reports having a cane, rolling walker, shower chair, oxygen concentrator, and glucometer. Patient reports Constantine Bird MD as pt's PCP and has [employer] Managed Medicare as their insurance. Patient reports receiving medications from [facility] on [address] and reports that she is able to afford medications at this time and at time of discharge. Patient reports that she is not on dialysis at this time. Patient reports that she is not receiving services with the coumadin clinic. Patient reports has not been admitted to the hospital within the last 30 days.    Patient reports does have a Living Will or Healthcare Power of . Patient denies mental health issues.    Patient expressed no other needs at this time. SW remains available.       12/15/19 1123   Discharge Assessment   Assessment Type Discharge Planning Assessment   Confirmed/corrected address and phone number on facesheet? Yes   Assessment information obtained from? Patient   Prior to hospitilization cognitive status: Alert/Oriented   Prior to hospitalization functional status: Assistive Equipment;Needs Assistance   Current cognitive status: Alert/Oriented   Current Functional Status: Assistive Equipment;Needs Assistance   Lives With  child(bonny), adult   Able to Return to Prior Arrangements yes   Is patient able to care for self after discharge? Yes   Who are your caregiver(s) and their phone number(s)? Maye Tirado (daughter) [phone]   Patient's perception of discharge disposition home health   Readmission Within the Last 30 Days no previous admission in last 30 days   Patient currently being followed by outpatient case management? No   Patient currently receives any other outside agency services? Yes   Name and contact number of agency or person providing outside services [facility]   Is it the patient/care giver preference to resume care with the current outside agency? Yes   Equipment Currently Used at Home cane, straight;walker, rolling;shower chair;glucometer;oxygen   Part D Coverage N/A; Pt has Managed Medicare   Do you have any problems affording any of your prescribed medications? No   Is the patient taking medications as prescribed? yes   Does the patient have transportation home? Yes   Transportation Anticipated family or friend will provide   Dialysis Name and Scheduled days N/A   Does the patient receive services at the Coumadin Clinic? No   Discharge Plan A Home Health;Home with family   DME Needed Upon Discharge  none   Patient/Family in Agreement with Plan yes

## 2019-12-15 NOTE — PROGRESS NOTES
"[facility]  Progress Note    Patient Name: Nelly Tian  MRN: [id number]  Patient Class: IP- Inpatient   Admission Date: 2019  Length of Stay: 1 days  Attending Physician: Sharmila Mcarthur MD  Primary Care Provider: Constantine Bird MD        Subjective:     Principal Problem:Cellulitis of abdominal wall        HPI:  H & P as per ER:  "Nelly Tian is a 69 y.o. female with Hx of recurrent skin infections who presents to the ED with c/o left-sided abdominal pain from cellulitis since this morning. Pt c/o chills, tremors, cough for 2 weeks, and congestion. She has a temp of 101.5 °F at time of arrival. The patient denies N/V/D, urinary symptoms, or any other symptoms at this time. Pt reports of smoking . PMHx of DM, COPD, HTN, CHF, and septic shock. PSHx includes , oophorectomy, and hysterectomy."    Patient seen in ER for complaints of shakes and fever. Started this morning. Tried taking tylenol for this but symptoms didn't improve. Was recently seen by her PCP (Dr. Bird) on  for abdomnial cellulitis. Was started on DURICEF but symptoms have not improved. Also complains of a cough with yellow phlegm since getting the flu shot about 3 weeks ago. Feels congested.  While in ER WBC was elevated at 20.1. Lactate was within normal limits. CXR showed no acute findings.     Overview/Hospital Course:  No notes on file    Interval History: Patient resfting in bed. States her rash is improving. Patient noted to be in afib with a low heart rate. Denies any symptoms.     Review of Systems   Constitutional: Negative for chills and fever.   HENT: Negative for congestion.    Respiratory: Positive for cough. Negative for shortness of breath.    Cardiovascular: Negative for chest pain.   Gastrointestinal: Negative for abdominal pain, constipation, diarrhea, nausea and vomiting.   Skin: Positive for rash. Negative for wound.   Neurological: Negative for headaches. "     Objective:     Vital Signs (Most Recent):  Temp: 96.2 °F (35.7 °C) (12/15/19 1117)  Pulse: (!) 42 (12/15/19 1117)  Resp: 18 (12/15/19 1117)  BP: (!) 97/42 (12/15/19 1117)  SpO2: 96 % (12/15/19 1117) Vital Signs (24h Range):  Temp:  [96.2 °F (35.7 °C)-102.4 °F (39.1 °C)] 96.2 °F (35.7 °C)  Pulse:  [] 42  Resp:  [16-20] 18  SpO2:  [93 %-99 %] 96 %  BP: ()/(42-77) 97/42     Weight: 107.3 kg (236 lb 9.6 oz)  Body mass index is 35.97 kg/m².    Intake/Output Summary (Last 24 hours) at 12/15/2019 1346  Last data filed at 12/15/2019 0600  Gross per 24 hour   Intake 650 ml   Output 800 ml   Net -150 ml      Physical Exam   Constitutional: She appears well-developed and well-nourished.   HENT:   Head: Normocephalic and atraumatic.   Cardiovascular: Bradycardia present.   Pulmonary/Chest: Effort normal and breath sounds normal.   Abdominal: Soft. Bowel sounds are normal.   Neurological: She is alert.   Skin: Skin is warm.        Psychiatric: She has a normal mood and affect.   Vitals reviewed.      Significant Labs:   Recent Lab Results       12/15/19  1136   12/15/19  0542   12/15/19  0127   12/14/19  2004   12/14/19  1928        Procalcitonin               Albumin     3.1         Alkaline Phosphatase     87         ALT     25         Anion Gap     7         Appearance, UA               AST     18         Bacteria, UA               Baso #     0.05         Basophil%     0.3         Bilirubin (UA)               BILIRUBIN TOTAL     0.4  Comment:  For infants and newborns, interpretation of results should be based  on gestational age, weight and in agreement with clinical  observations.  Premature Infant recommended reference ranges:  Up to 24 hours.............<8.0 mg/dL  Up to 48 hours............<12.0 mg/dL  3-5 days..................<15.0 mg/dL  6-29 days.................<15.0 mg/dL           BUN, Bld     12         Calcium     8.7         Chloride     101         CO2     28         Color, UA                Creatinine     0.7         Differential Method     Automated         eGFR if      >60         eGFR if non      >60  Comment:  Calculation used to obtain the estimated glomerular filtration  rate (eGFR) is the CKD-EPI equation.            Eos #     0.0         Eosinophil%     0.1         Estimated Avg Glucose         105     Glucose     76         Glucose, UA               Gran # (ANC)     14.3         Gran%     80.6         Hematocrit     37.8         Hemoglobin     11.2         Hemoglobin A1C External         5.3  Comment:  ADA Screening Guidelines:  5.7-6.4%  Consistent with prediabetes  >or=6.5%  Consistent with diabetes  High levels of fetal hemoglobin interfere with the HbA1C  assay. Heterozygous hemoglobin variants (HbS, HgC, etc)do  not significantly interfere with this assay.   However, presence of multiple variants may affect accuracy.       Immature Grans (Abs)     0.06  Comment:  Mild elevation in immature granulocytes is non specific and   can be seen in a variety of conditions including stress response,   acute inflammation, trauma and pregnancy. Correlation with other   laboratory and clinical findings is essential.           Ketones, UA               Lactate, [place]         1.8  Comment:  Falsely low lactic acid results can be found in samples   containing >=13.0 mg/dL total bilirubin and/or >=3.5 mg/dL   direct bilirubin.       Leukocytes, UA               Lymph #     1.7         Lymph%     9.6         MCH     26.0         MCHC     29.6         MCV     88         Microscopic Comment               Mono #     1.6         Mono%     9.1         MPV     10.2         NITRITE UA               nRBC     0         Occult Blood UA               pH, UA               Platelets     197         POCT Glucose 92 88   187       Potassium     4.5         PROTEIN TOTAL     6.2         Protein, UA               RBC     4.31         RBC, UA               RDW     15.1         Sodium     136          Specific [place], UA               Specimen UA               Squam Epithel, UA               Troponin I     <0.006  Comment:  The reference interval for Troponin I represents the 99th percentile   cutoff   for our facility and is consistent with 3rd generation assay   performance.     <0.006  Comment:  The reference interval for Troponin I represents the 99th percentile   cutoff   for our facility and is consistent with 3rd generation assay   performance.            <0.006  Comment:  The reference interval for Troponin I represents the 99th percentile   cutoff   for our facility and is consistent with 3rd generation assay   performance.     <0.006  Comment:  The reference interval for Troponin I represents the 99th percentile   cutoff   for our facility and is consistent with 3rd generation assay   performance.       UROBILINOGEN UA               WBC, UA               WBC     17.66                          12/14/19  1713   12/14/19  1622        Procalcitonin 0.15  Comment:  A concentration < 0.25 ng/mL represents a low risk bacterial   infection.  Procalcitonin may not be accurate among patients with localized   infection, recent trauma or major surgery, immunosuppressed state,   invasive fungal infection, renal dysfunction. Decisions regarding   initiation or continuation of antibiotic therapy should not be based   solely on procalcitonin levels.         Albumin         Alkaline Phosphatase         ALT         Anion Gap         Appearance, UA   Clear     AST         Bacteria, UA   Rare     Baso #         Basophil%         Bilirubin (UA)   Negative     BILIRUBIN TOTAL         BUN, Bld         Calcium         Chloride         CO2         Color, UA   Yellow     Creatinine         Differential Method         eGFR if          eGFR if non          Eos #         Eosinophil%         Estimated Avg Glucose         Glucose         Glucose, UA   Negative     Gran # (ANC)         Gran%          Hematocrit         Hemoglobin         Hemoglobin A1C External         Immature Grans (Abs)         Ketones, UA   Trace     Lactate, [place]         Leukocytes, UA   1+     Lymph #         Lymph%         MCH         MCHC         MCV         Microscopic Comment   SEE COMMENT  Comment:  Other formed elements not mentioned in the report are not   present in the microscopic examination.        Mono #         Mono%         MPV         NITRITE UA   Negative     nRBC         Occult Blood UA   Negative     pH, UA   7.0     Platelets         POCT Glucose         Potassium         PROTEIN TOTAL         Protein, UA   Negative  Comment:  Recommend a 24 hour urine protein or a urine   protein/creatinine ratio if globulin induced proteinuria is  clinically suspected.       RBC         RBC, UA   1     RDW         Sodium         Specific Gravity, UA   1.010     Specimen UA   Urine, Clean Catch     Squam Epithel, UA   3     Troponin I         UROBILINOGEN UA   Negative     WBC, UA   7     WBC               Significant Imaging: I have reviewed all pertinent imaging results/findings within the past 24 hours.      Assessment/Plan:      * Cellulitis of abdominal wall  Continue IV vanc  ID consulted  F/u wound culture    CHF (congestive heart failure)  F/U echo    COPD (chronic obstructive pulmonary disease)  Continue home med of breo and nebs prn  Doxy    DDD (degenerative disc disease), lumbar  Continue home med of norco      Hypertension associated with diabetes  Monitor blood pressure.  Continue home med of lisinopril    Chronic atrial fibrillation  Continue home med of eliquis  Place on tele  Cards following for bradycardia     GERD (gastroesophageal reflux disease)  Protonix      Major depression, recurrent, chronic  Continue cymbalta      Diabetes mellitus with neuropathy  Continue home med gabapentin and cymbalta  Sliding scale insulin      VTE Risk Mitigation (From admission, onward)         Ordered     apixaban tablet 5 mg  2  times daily      12/14/19 1842     Place DEVEN hose  Until discontinued      12/14/19 1842     Place sequential compression device  Until discontinued      12/14/19 1842     IP VTE HIGH RISK PATIENT  Once      12/14/19 1842                      Sharmila Mcarthur MD  Department of Hospital Medicine   [facility]

## 2019-12-15 NOTE — CONSULTS
Subjective:       Nelly Tian is a 69 y.o. female with h/o AF, HTN, HLD, DM, COPD, chronic cellulitis on abd, was admitted for cellulitis of abd.   ECG monitor showed AF with slow HR around 40 around noon today, Bp 97/42. Pt denies chest pain, sob, palpitation, dizziness, syncope or presyncope; no leg swelling, PND or orthpnea, coughing, abd pain, n/v, or active bleeding.  She denies heart disease beyond AF. Not taking bb, CCB, dig, or Amio at home.    Past Medical History:   Diagnosis Date    *Atrial flutter     Angina pectoris 9/18/2017    Anxiety     Arthritis     Asthma     Atrial fibrillation     Back pain     Cataract     OD    CHF (congestive heart failure)     COPD (chronic obstructive pulmonary disease)     Depression     Diabetes mellitus     Emphysema of lung     Heart failure     Hepatomegaly 2/3/2016    Hernia     History of MI (myocardial infarction) 1/19/2016    Hypercapnic respiratory failure, chronic 11/16/2016    Hyperlipidemia     Hypertension     Iron deficiency anemia 2/3/2016    Myocardial infarction     Obesity     Peripheral vascular disease     Pneumonia     Polyneuropathy     Retinal detachment     OS    Septic shock 4/23/2017    Skin ulcer 3/18/2017    Tobacco dependence     Type II 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, not stated as uncontrolled(250.60)      Family History   Problem Relation Age of Onset    Arthritis Father     Heart disease Father     Early death Sister 30        heart disease    Heart disease Sister 32        MI    Cancer Brother         Lung cancer    No Known Problems Daughter     Blindness Son         due to accident//    Arthritis Sister     Heart disease Sister     Crohn's disease Sister     Alcohol abuse Mother     Breast cancer Neg Hx     Glaucoma Neg Hx     Macular degeneration Neg Hx     Retinal detachment Neg Hx     Amblyopia Neg Hx     Diabetes Neg Hx     Cataracts  Neg Hx     Hypertension Neg Hx     Strabismus Neg Hx     Stroke Neg Hx     Thyroid disease Neg Hx      No current facility-administered medications on file prior to encounter.      Current Outpatient Medications on File Prior to Encounter   Medication Sig Dispense Refill    albuterol (ACCUNEB) 0.63 mg/3 mL Nebu Take 3 mLs (0.63 mg total) by nebulization every 6 (six) hours as needed. Rescue 75 mL 11    albuterol (PROVENTIL/VENTOLIN HFA) 90 mcg/actuation inhaler INHALE 2 PUFFS INTO THE LUNGS EVERY 6 (SIX) HOURS AS NEEDED FOR RESCUE 54 g 3    apixaban (ELIQUIS) 5 mg Tab Take 1 tablet (5 mg total) by mouth 2 (two) times daily. 180 tablet 3    ascorbic acid, vitamin C, (VITAMIN C) 500 MG tablet Take 2 tablets (1,000 mg total) by mouth every evening.      atorvastatin (LIPITOR) 20 MG tablet Take 1 tablet (20 mg total) by mouth once daily. 90 tablet 3    blood-glucose meter (TRUE METRIX AIR GLUCOSE METER) kit AC meals. Insulin dependent.Dispence strips and lancets 3 months. 1 each 0    BREO ELLIPTA 100-25 mcg/dose diskus inhaler INHALE 1 PUFF INTO THE LUNGS ONCE DAILY. 180 each 2    budesonide (PULMICORT) 0.5 mg/2 mL nebulizer solution       cefadroxil (DURICEF) 500 MG Cap Take 1 capsule (500 mg total) by mouth every 12 (twelve) hours. 30 capsule 0    clonazePAM (KLONOPIN) 0.5 MG tablet Take 1 tablet (0.5 mg total) by mouth 2 (two) times daily. TAKE ONE TABLET BY MOUTH TWO TIMES A DAY AS NEEDED. Medically necessary 60 tablet 2    DULoxetine (CYMBALTA) 30 MG capsule TAKE 1 CAPSULE ONCE DAILY 90 capsule 11    ferrous sulfate (FEOSOL) 325 mg (65 mg iron) Tab tablet Take 1 tablet (325 mg total) by mouth 2 (two) times daily. 60 tablet 3    fluticasone propionate (FLONASE) 50 mcg/actuation nasal spray 1 spray (50 mcg total) by Each Nostril route once daily. 1 Bottle 3    furosemide (LASIX) 40 MG tablet Take 0.5 tablets (20 mg total) by mouth 3 (three) times a week. 6 tablet 2    gabapentin (NEURONTIN) 800 MG  tablet Take 1 tablet (800 mg total) by mouth 3 (three) times daily. 270 tablet 3    HYDROcodone-acetaminophen (NORCO)  mg per tablet Take 1 tablet by mouth every 12 (twelve) hours as needed for Pain. Medical necessary 60 tablet 0    lancets Misc To check BG 3 times daily, to use with insurance preferred meter. 300 each 11    levalbuterol (XOPENEX) 0.63 mg/3 mL nebulizer solution INHALE THE CONTENTS OF 1 VIAL BY NEBULIZATION 4 times  DAILY.    DX: J43.9 792 mL 0    lisinopril (PRINIVIL,ZESTRIL) 20 MG tablet TAKE ONE TABLET BY MOUTH EVERY DAY 90 tablet 11    loratadine (CLARITIN) 10 mg tablet Take 1 tablet (10 mg total) by mouth once daily.  0    metFORMIN (GLUCOPHAGE) 500 MG tablet TAKE 1 TABLET (500 MG TOTAL) BY MOUTH 2 (TWO) TIMES DAILY WITH MEALS. 180 tablet 3    multivitamin (THERAGRAN) tablet Take 1 tablet by mouth once daily.      nystatin (NYSTOP) powder Apply topically 2 (two) times daily. 120 g 11    nystatin-triamcinolone (MYCOLOG II) cream Apply topically 3 (three) times daily. 30 g 1    omega-3 acid ethyl esters (LOVAZA) 1 gram capsule 2 (two) times daily.       omeprazole (PRILOSEC) 40 MG capsule Take 1 capsule (40 mg total) by mouth once daily. 90 capsule 3    ondansetron (ZOFRAN-ODT) 8 MG TbDL Take 1 tablet (8 mg total) by mouth every 6 (six) hours as needed. 20 tablet 3    polyethylene glycol (GLYCOLAX) 17 gram/dose powder       potassium chloride SA (K-DUR,KLOR-CON) 20 MEQ tablet TAKE ONE TABLET BY MOUTH EVERY DAY 30 tablet 11    senna-docusate 8.6-50 mg (PERICOLACE) 8.6-50 mg per tablet Take 1 tablet by mouth 2 (two) times daily as needed for Constipation.      traZODone (DESYREL) 100 MG tablet TAKE 1 & 1/2 TABLET BY MOUTH EVERY EVENING 90 tablet 11    TRUE METRIX GLUCOSE TEST STRIP Strp TEST BLOOD SUGAR THREE TIMES A DAY  300 strip 3       Review of Systems  12 systems reviewed, negative except mentioned in HPI.     Objective:     Vital Signs (Most Recent):  Temp: 96.2 °F  (35.7 °C) (12/15/19 1117)  Pulse: (!) 42 (12/15/19 1117)  Resp: 18 (12/15/19 1117)  BP: (!) 97/42 (12/15/19 1117)  SpO2: 96 % (12/15/19 1117) Vital Signs (24h Range):  Temp:  [96.2 °F (35.7 °C)-102.4 °F (39.1 °C)] 96.2 °F (35.7 °C)  Pulse:  [] 42  Resp:  [16-20] 18  SpO2:  [93 %-99 %] 96 %  BP: ()/(42-77) 97/42       Physical Exam  Gen: Comfort, sitting on bed, in no distress; obese  Skin: warm and dry; redness on L lower abd wall  Lymph: no lymphadenopathy detected  HEENT: NC/AT  Neck: supple, no JVD/bruit  Chest: no deformity, equal movement b/l  Lung: CTA b/l  Heart: Irregular, S1/S2 +, no M/G/R  Abd: BS+, S, NT/ND  Ext: no deformity, no pretibial edema  Pulse: b/l radial 2+  Neuro: AAOx3    Cardiographics  ECG 12/15/19: AF with slow HR, 41 bpm, low voltage, poor R progression..  Echocardiogram 2/1/18:    1 - Low normal to mildly depressed left ventricular systolic function (EF 50-55%).     2 - The estimated PA systolic pressure is greater than 22 mmHg.     3 - Mild aortic regurgitation.     4 - Mild mitral regurgitation.   [facility] 11/24/14:  - Left Main Coronary Artery: The LM is normal. There is BRIGID 3 flow.     - Left Anterior Descending Artery: The LAD has luminal irregularities. There is BRIGID 3 flow.     - Left Circumflex Artery: The LCX has luminal irregularities. There is BRIGID 3 flow.     - Right Coronary Artery: The RCA has luminal irregularities. There is BRIGID 3 flow. Small non-dominant artery    Imaging  Chest x-ray 12/14/19: No acute cardiopulmonary abnormality appreciated radiographically.  No interval detrimental change in the radiographic appearance of the chest when compared to the previous study.    Lab Review   Lab Results   Component Value Date    WBC 17.66 (H) 12/15/2019    HGB 11.2 (L) 12/15/2019    HCT 37.8 12/15/2019    MCV 88 12/15/2019     12/15/2019     BMP  Lab Results   Component Value Date     12/15/2019    K 4.5 12/15/2019     12/15/2019    CO2 28 12/15/2019     BUN 12 12/15/2019    CREATININE 0.7 12/15/2019    CALCIUM 8.7 12/15/2019    ANIONGAP 7 (L) 12/15/2019    ESTGFRAFRICA >60 12/15/2019    EGFRNONAA >60 12/15/2019    Results for FREDY GIBBS (MRN [id number]) as of 12/15/2019 13:20   Ref. Range 12/14/2019 19:28 12/14/2019 19:28 12/15/2019 01:27   Troponin I Latest Ref Range: 0.000 - 0.026 ng/mL <0.006 <0.006 <0.006      Assessment:   A-fib with slow HR, asymptomatic; no evidence of ischemia  Cellulitis  HTN  DM  COPD   Plan:   Continue monitor ECG.  Avoid any meds that potentially having AVB function; if symptomatic, likely need pacer.  Continue antibiotic per ID.  Balance electrolytes.  Echo.

## 2019-12-15 NOTE — PLAN OF CARE
Pt admitted for Abdominal cellulitis last night. Earlier in shift about 1120 monitor room called to inform me that my pt was bradycardic in Afib dropping into the 30's heart rate. Called Dr. Mcarthur to notify her of this she was asymptomatic stated that she feels fine. Monitor room continued to record irregular heart rate Stat EKG ordered with Cardiology consult Dr. Ibrahim saw pt stated to hold all cardiac meds and to place pacing pads on pt. Infectious disease also follows pt antibiotics changed no ADR noted. Collected wound culture from abdominal fold where it was leaking clear fluid. Echo scheduled for tomorrow. Blood glucose monitoring. Pt cont to be asymptomatic. Call light within reach will cont to monitor.

## 2019-12-15 NOTE — PROGRESS NOTES
Pt's HR 38-40 on tele monitor. Pt asymptomatic at this time. Dr. Simon called to bedside. Cardiology consulted and STAT EKG ordered.

## 2019-12-15 NOTE — NURSING
Patient arrived to unit via bed. Oriented to room. VSS. Educated on call light use and within reach, bed in low position w/ wheels locked, side rails up x 2, bed alarm set. IV abx infusing per order. SCDs initiated. Telemetry in place. POC reviewed with patient. No questions at this time. Will continue to monitor.

## 2019-12-15 NOTE — CONSULTS
Consult Note  Infectious Disease    Reason for Consult:  Cellulitis of abd wall    HPI: Nelly Tian is a   69 y.o. female with whom I am familiar from prior consultations. She has been on suppression with daily cefadroxil for recurrent abd wall cellulitis. The large ulcerations that she had 6 months ago are healed. She is preparing for hernia repair? Panniculectomy? In January. She developed bronchitis about 2 weeks ago, along with other family members who had URIs and she has been unable to improve. She saw Dr. Bird 12/2 and was given prednisone x 3 days and a new rx of cefadroxil. She continued to cough, with purulent sputum and yesterday she noted acute onset of left sided abdominal wall erythema and fever to 101. She has had no injury, new skin lesion or recurrence of candidiasis. She does continue to smoke. She did have a flu vaccine.     Review of patient's allergies indicates:   Allergen Reactions    Dilaudid [hydromorphone] Anaphylaxis     Other reaction(s): Anaphylaxis  Other reaction(s): Unknown    Zyvox [linezolid in dextrose 5%] Shortness Of Breath     Past Medical History:   Diagnosis Date    *Atrial flutter     Angina pectoris 9/18/2017    Anxiety     Arthritis     Asthma     Atrial fibrillation     Back pain     Cataract     OD    CHF (congestive heart failure)     COPD (chronic obstructive pulmonary disease)     Depression     Diabetes mellitus     Emphysema of lung     Heart failure     Hepatomegaly 2/3/2016    Hernia     History of MI (myocardial infarction) 1/19/2016    Hypercapnic respiratory failure, chronic 11/16/2016    Hyperlipidemia     Hypertension     Iron deficiency anemia 2/3/2016    Myocardial infarction     Obesity     Peripheral vascular disease     Pneumonia     Polyneuropathy     Retinal detachment     OS    Septic shock 4/23/2017    Skin ulcer 3/18/2017    Tobacco dependence     Type II or unspecified type diabetes mellitus with  neurological manifestations, not stated as uncontrolled(250.60)      Past Surgical History:   Procedure Laterality Date    BREAST BIOPSY Left 10 yrs ago    benign    CATARACT EXTRACTION Left     OS      SECTION      x2    EYE SURGERY      HYSTERECTOMY      partial    OOPHORECTOMY      one ovary conserved    RETINAL DETACHMENT SURGERY      buckle --OS    TONSILLECTOMY       Social History     Socioeconomic History    Marital status:      Spouse name: Not on file    Number of children: Not on file    Years of education: Not on file    Highest education level: Not on file   Occupational History    Not on file   Social Needs    Financial resource strain: Not on file    Food insecurity:     Worry: Not on file     Inability: Not on file    Transportation needs:     Medical: Not on file     Non-medical: Not on file   Tobacco Use    Smoking status: Former Smoker     Packs/day: 0.25     Years: 50.00     Pack years: 12.50     Types: Cigarettes     Start date: 1968     Last attempt to quit: 2019     Years since quittin.3    Smokeless tobacco: Never Used   Substance and Sexual Activity    Alcohol use: No     Alcohol/week: 0.0 standard drinks     Frequency: Never    Drug use: No    Sexual activity: Not Currently   Lifestyle    Physical activity:     Days per week: Not on file     Minutes per session: Not on file    Stress: Not on file   Relationships    Social connections:     Talks on phone: Not on file     Gets together: Not on file     Attends Yazdanism service: Not on file     Active member of club or organization: Not on file     Attends meetings of clubs or organizations: Not on file     Relationship status: Not on file   Other Topics Concern    Not on file   Social History Narrative    Not on file     Family History   Problem Relation Age of Onset    Arthritis Father     Heart disease Father     Early death Sister 30        heart disease    Heart disease Sister 32         MI    Cancer Brother         Lung cancer    No Known Problems Daughter     Blindness Son         due to accident//    Arthritis Sister     Heart disease Sister     Crohn's disease Sister     Alcohol abuse Mother     Breast cancer Neg Hx     Glaucoma Neg Hx     Macular degeneration Neg Hx     Retinal detachment Neg Hx     Amblyopia Neg Hx     Diabetes Neg Hx     Cataracts Neg Hx     Hypertension Neg Hx     Strabismus Neg Hx     Stroke Neg Hx     Thyroid disease Neg Hx        Pertinent medications noted:     Review of Systems:    chills, fever, sweats, and intentional weight loss  No change in vision,    No sinus congestion,    No pain in mouth or throat. No problems with teeth, gums.  No chest pain, palpitations, syncope   cough,  Yellow-green sputum production, shortness of breath,  And exposure to several ill family members.  No nausea, vomiting, diarrhea, , or focal abd pain,    No swelling of joints, redness of joints, injuries, or new focal pain  No unusual headaches, , falls  No anxiety,  substance abuse, sleep disturbance  No hyperglycemia  No bleeding, , anemia  unusual bruising  No new rashes, lesions, or wounds      recent/prior steroids: 3 days of prednisone 20 mg 12/3-5 which she states did not help her cough  Outdoor activities: n/a  Travel: n/a  Implants: none  Antibiotic History: on cefadroxil suppression for recurrent strep celluiltis of abd wall    EXAM & DIAGNOSTICS REVIEWED:   Vitals:     Temp:  [96.2 °F (35.7 °C)-102.4 °F (39.1 °C)]   Temp: 96.2 °F (35.7 °C) (12/15/19 1117)  Pulse: (!) 42 (12/15/19 1117)  Resp: 18 (12/15/19 1117)  BP: (!) 97/42 (12/15/19 1117)  SpO2: 96 % (12/15/19 1117)    Intake/Output Summary (Last 24 hours) at 12/15/2019 1242  Last data filed at 12/15/2019 0600  Gross per 24 hour   Intake 650 ml   Output 800 ml   Net -150 ml       General:  In NAD. Alert and attentive, cooperative, comfortable, non toxic  Eyes:  Anicteric, PERRL, EOMI  ENT:  No ulcers,  exudates, thrush, nares patent, dentition is fair  Neck:  supple, no masses or adenopathy appreciated  Lungs: Loose rhonchi, no consolidation, wet cough  Heart:  iRRR, slow, no gallop/murmur/rub noted  Abd:  Soft, NT, ND, normal BS, no masses or organomegaly appreciated.   Abdominal wall over large left sided hernia forms large pannus (smaller than  May) which is erythema along the outer edge with red streaks emanating proximally. She states these streaks are not new. There are no abscesses, bullae, and there is no crepitance to palpation and no ischemic change. The previously seen candidiasis in may is under excellent control at this time. There is one pinpoint of serous, non purulent drainage medial to long axis of pannus. There is no extension of cellulitis to flank or to the right side of her abdomen  :  Voids   Musc:  Joints without effusion, swelling, erythema, synovitis, muscle wasting.   Skin:  No rashes. But extensive petechiae of right lower leg/foot which she indicates is not new  Wound:   Neuro:   lert, attentive, speech fluent, face symmetric, moves all extremities, no focal weakness. Ambulatory  Psych:  Calm, cooperative     Extrem: No edema, erythema, phlebitis, cellulitis, warm and well perfused  VAD:       Isolation:      Lines/Tubes/Drains:    General Labs reviewed:  Recent Labs   Lab 12/14/19  1548 12/15/19  0127   WBC 20.10* 17.66*   HGB 12.2 11.2*   HCT 41.3 37.8    197       Recent Labs   Lab 12/14/19  1548 12/15/19  0127   * 136   K 5.1 4.5   CL 98 101   CO2 26 28   BUN 14 12   CREATININE 0.7 0.7   CALCIUM 9.7 8.7   PROT 7.5 6.2   BILITOT 0.7 0.4   ALKPHOS 105 87   ALT 31 25   AST 29 18           Micro:  Microbiology Results (last 7 days)     Procedure Component Value Units Date/Time    Culture, Respiratory with Gram Stain [[id number]]     Order Status:  No result Specimen:  Respiratory from Sputum, Expectorated     Aerobic culture [[id number]] Collected:  12/15/19 1130    Order  Status:  Sent Specimen:  Skin from Abdomen Updated:  12/15/19 1142    Culture, Anaerobe [[id number]] Collected:  12/15/19 1130    Order Status:  Sent Specimen:  Skin from Abdomen Updated:  12/15/19 1142    Blood culture x two cultures. Draw prior to antibiotics. [[id number]] Collected:  12/14/19 1548    Order Status:  Completed Specimen:  Blood from Peripheral, Left  Wrist Updated:  12/15/19 0715     Blood Culture, Routine No Growth to date    Narrative:       Aerobic and anaerobic    Blood culture x two cultures. Draw prior to antibiotics. [[id number]] Collected:  12/14/19 1548    Order Status:  Completed Specimen:  Blood Updated:  12/15/19 0715     Blood Culture, Routine No Growth to date    Narrative:       Aerobic and anaerobic    Influenza A & B by Molecular [[id number]] Collected:  12/14/19 1531    Order Status:  Completed Specimen:  Nasopharyngeal Swab Updated:  12/14/19 1559     Influenza A, Molecular Negative     Influenza B, Molecular Negative     Flu A & B Source Nasal swab        Imaging Reviewed:   CXR clear      Cardiology:    IMPRESSION & PLAN   1. Abdominal wall cellulitis, involving large pannus over hernia  2. Bronchitis, smoker  3. Bradycardia, chronic atrial fib      Recommendations:  vanc for skin and doxy for bronchitis  Sputum culture, aerosol bronchodilators    D/w dr. ruggiero

## 2019-12-15 NOTE — PLAN OF CARE
Patient receiving aerosol tx via 1.25mg ventolin and nalcl now and q6hr prn.  02 saturation 945 on room air.  Patient has home 02 and Aerosol tx's.

## 2019-12-15 NOTE — SUBJECTIVE & OBJECTIVE
Interval History: Patient resfting in bed. States her rash is improving. Patient noted to be in afib with a low heart rate. Denies any symptoms.     Review of Systems   Constitutional: Negative for chills and fever.   HENT: Negative for congestion.    Respiratory: Positive for cough. Negative for shortness of breath.    Cardiovascular: Negative for chest pain.   Gastrointestinal: Negative for abdominal pain, constipation, diarrhea, nausea and vomiting.   Skin: Positive for rash. Negative for wound.   Neurological: Negative for headaches.     Objective:     Vital Signs (Most Recent):  Temp: 96.2 °F (35.7 °C) (12/15/19 1117)  Pulse: (!) 42 (12/15/19 1117)  Resp: 18 (12/15/19 1117)  BP: (!) 97/42 (12/15/19 1117)  SpO2: 96 % (12/15/19 1117) Vital Signs (24h Range):  Temp:  [96.2 °F (35.7 °C)-102.4 °F (39.1 °C)] 96.2 °F (35.7 °C)  Pulse:  [] 42  Resp:  [16-20] 18  SpO2:  [93 %-99 %] 96 %  BP: ()/(42-77) 97/42     Weight: 107.3 kg (236 lb 9.6 oz)  Body mass index is 35.97 kg/m².    Intake/Output Summary (Last 24 hours) at 12/15/2019 1346  Last data filed at 12/15/2019 0600  Gross per 24 hour   Intake 650 ml   Output 800 ml   Net -150 ml      Physical Exam   Constitutional: She appears well-developed and well-nourished.   HENT:   Head: Normocephalic and atraumatic.   Cardiovascular: Bradycardia present.   Pulmonary/Chest: Effort normal and breath sounds normal.   Abdominal: Soft. Bowel sounds are normal.   Neurological: She is alert.   Skin: Skin is warm.        Psychiatric: She has a normal mood and affect.   Vitals reviewed.      Significant Labs:   Recent Lab Results       12/15/19  1136   12/15/19  0542   12/15/19  0127   12/14/19  2004   12/14/19  1928        Procalcitonin               Albumin     3.1         Alkaline Phosphatase     87         ALT     25         Anion Gap     7         Appearance, UA               AST     18         Bacteria, UA               Baso #     0.05         Basophil%     0.3          Bilirubin (UA)               BILIRUBIN TOTAL     0.4  Comment:  For infants and newborns, interpretation of results should be based  on gestational age, weight and in agreement with clinical  observations.  Premature Infant recommended reference ranges:  Up to 24 hours.............<8.0 mg/dL  Up to 48 hours............<12.0 mg/dL  3-5 days..................<15.0 mg/dL  6-29 days.................<15.0 mg/dL           BUN, Bld     12         Calcium     8.7         Chloride     101         CO2     28         Color, UA               Creatinine     0.7         Differential Method     Automated         eGFR if      >60         eGFR if non      >60  Comment:  Calculation used to obtain the estimated glomerular filtration  rate (eGFR) is the CKD-EPI equation.            Eos #     0.0         Eosinophil%     0.1         Estimated Avg Glucose         105     Glucose     76         Glucose, UA               Gran # (ANC)     14.3         Gran%     80.6         Hematocrit     37.8         Hemoglobin     11.2         Hemoglobin A1C External         5.3  Comment:  ADA Screening Guidelines:  5.7-6.4%  Consistent with prediabetes  >or=6.5%  Consistent with diabetes  High levels of fetal hemoglobin interfere with the HbA1C  assay. Heterozygous hemoglobin variants (HbS, HgC, etc)do  not significantly interfere with this assay.   However, presence of multiple variants may affect accuracy.       Immature Grans (Abs)     0.06  Comment:  Mild elevation in immature granulocytes is non specific and   can be seen in a variety of conditions including stress response,   acute inflammation, trauma and pregnancy. Correlation with other   laboratory and clinical findings is essential.           Ketones, UA               Lactate, [place]         1.8  Comment:  Falsely low lactic acid results can be found in samples   containing >=13.0 mg/dL total bilirubin and/or >=3.5 mg/dL   direct bilirubin.       Leukocytes, UA                Lymph #     1.7         Lymph%     9.6         MCH     26.0         MCHC     29.6         MCV     88         Microscopic Comment               Mono #     1.6         Mono%     9.1         MPV     10.2         NITRITE UA               nRBC     0         Occult Blood UA               pH, UA               Platelets     197         POCT Glucose 92 88   187       Potassium     4.5         PROTEIN TOTAL     6.2         Protein, UA               RBC     4.31         RBC, UA               RDW     15.1         Sodium     136         Specific [place], UA               Specimen UA               Squam Epithel, UA               Troponin I     <0.006  Comment:  The reference interval for Troponin I represents the 99th percentile   cutoff   for our facility and is consistent with 3rd generation assay   performance.     <0.006  Comment:  The reference interval for Troponin I represents the 99th percentile   cutoff   for our facility and is consistent with 3rd generation assay   performance.            <0.006  Comment:  The reference interval for Troponin I represents the 99th percentile   cutoff   for our facility and is consistent with 3rd generation assay   performance.     <0.006  Comment:  The reference interval for Troponin I represents the 99th percentile   cutoff   for our facility and is consistent with 3rd generation assay   performance.       UROBILINOGEN UA               WBC, UA               WBC     17.66                          12/14/19  1713   12/14/19  1622        Procalcitonin 0.15  Comment:  A concentration < 0.25 ng/mL represents a low risk bacterial   infection.  Procalcitonin may not be accurate among patients with localized   infection, recent trauma or major surgery, immunosuppressed state,   invasive fungal infection, renal dysfunction. Decisions regarding   initiation or continuation of antibiotic therapy should not be based   solely on procalcitonin levels.         Albumin         Alkaline  Phosphatase         ALT         Anion Gap         Appearance, UA   Clear     AST         Bacteria, UA   Rare     Baso #         Basophil%         Bilirubin (UA)   Negative     BILIRUBIN TOTAL         BUN, Bld         Calcium         Chloride         CO2         Color, UA   Yellow     Creatinine         Differential Method         eGFR if          eGFR if non          Eos #         Eosinophil%         Estimated Avg Glucose         Glucose         Glucose, UA   Negative     Gran # (ANC)         Gran%         Hematocrit         Hemoglobin         Hemoglobin A1C External         Immature Grans (Abs)         Ketones, UA   Trace     Lactate, [place]         Leukocytes, UA   1+     Lymph #         Lymph%         MCH         MCHC         MCV         Microscopic Comment   SEE COMMENT  Comment:  Other formed elements not mentioned in the report are not   present in the microscopic examination.        Mono #         Mono%         MPV         NITRITE UA   Negative     nRBC         Occult Blood UA   Negative     pH, UA   7.0     Platelets         POCT Glucose         Potassium         PROTEIN TOTAL         Protein, UA   Negative  Comment:  Recommend a 24 hour urine protein or a urine   protein/creatinine ratio if globulin induced proteinuria is  clinically suspected.       RBC         RBC, UA   1     RDW         Sodium         Specific Gravity, UA   1.010     Specimen UA   Urine, Clean Catch     Squam Epithel, UA   3     Troponin I         UROBILINOGEN UA   Negative     WBC, UA   7     WBC               Significant Imaging: I have reviewed all pertinent imaging results/findings within the past 24 hours.

## 2019-12-16 ENCOUNTER — OUTSIDE PLACE OF SERVICE (OUTPATIENT)
Dept: INFECTIOUS DISEASES | Facility: CLINIC | Age: 69
End: 2019-12-16
Payer: MEDICARE

## 2019-12-16 DIAGNOSIS — J44.9 COPD (CHRONIC OBSTRUCTIVE PULMONARY DISEASE): ICD-10-CM

## 2019-12-16 DIAGNOSIS — K45.8 FLANK HERNIA: ICD-10-CM

## 2019-12-16 DIAGNOSIS — L03.311 CELLULITIS OF ABDOMINAL WALL: ICD-10-CM

## 2019-12-16 DIAGNOSIS — F17.200 TOBACCO DEPENDENCY: ICD-10-CM

## 2019-12-16 DIAGNOSIS — E66.01 SEVERE OBESITY (BMI 35.0-35.9 WITH COMORBIDITY): ICD-10-CM

## 2019-12-16 PROBLEM — I50.32 CHRONIC DIASTOLIC CONGESTIVE HEART FAILURE: Status: ACTIVE | Noted: 2017-12-19

## 2019-12-16 LAB
ALBUMIN SERPL BCP-MCNC: 3.1 G/DL (ref 3.5–5.2)
ALP SERPL-CCNC: 92 U/L (ref 55–135)
ALT SERPL W/O P-5'-P-CCNC: 23 U/L (ref 10–44)
ANION GAP SERPL CALC-SCNC: 6 MMOL/L (ref 8–16)
AST SERPL-CCNC: 23 U/L (ref 10–40)
BASOPHILS # BLD AUTO: 0.03 K/UL (ref 0–0.2)
BASOPHILS NFR BLD: 0.5 % (ref 0–1.9)
BILIRUB SERPL-MCNC: 0.3 MG/DL (ref 0.1–1)
BUN SERPL-MCNC: 13 MG/DL (ref 8–23)
CALCIUM SERPL-MCNC: 9.3 MG/DL (ref 8.7–10.5)
CHLORIDE SERPL-SCNC: 102 MMOL/L (ref 95–110)
CO2 SERPL-SCNC: 30 MMOL/L (ref 23–29)
CREAT SERPL-MCNC: 0.7 MG/DL (ref 0.5–1.4)
DIFFERENTIAL METHOD: ABNORMAL
EOSINOPHIL # BLD AUTO: 0.2 K/UL (ref 0–0.5)
EOSINOPHIL NFR BLD: 2.9 % (ref 0–8)
ERYTHROCYTE [DISTWIDTH] IN BLOOD BY AUTOMATED COUNT: 15.1 % (ref 11.5–14.5)
EST. GFR  (AFRICAN AMERICAN): >60 ML/MIN/1.73 M^2
EST. GFR  (NON AFRICAN AMERICAN): >60 ML/MIN/1.73 M^2
GLUCOSE SERPL-MCNC: 87 MG/DL (ref 70–110)
HCT VFR BLD AUTO: 37.9 % (ref 37–48.5)
HGB BLD-MCNC: 11 G/DL (ref 12–16)
IMM GRANULOCYTES # BLD AUTO: 0.03 K/UL (ref 0–0.04)
LYMPHOCYTES # BLD AUTO: 1.9 K/UL (ref 1–4.8)
LYMPHOCYTES NFR BLD: 30 % (ref 18–48)
MCH RBC QN AUTO: 25.7 PG (ref 27–31)
MCHC RBC AUTO-ENTMCNC: 29 G/DL (ref 32–36)
MCV RBC AUTO: 89 FL (ref 82–98)
MONOCYTES # BLD AUTO: 1.3 K/UL (ref 0.3–1)
MONOCYTES NFR BLD: 20.1 % (ref 4–15)
NEUTROPHILS # BLD AUTO: 2.9 K/UL (ref 1.8–7.7)
NEUTROPHILS NFR BLD: 46 % (ref 38–73)
NRBC BLD-RTO: 0 /100 WBC
PLATELET # BLD AUTO: 189 K/UL (ref 150–350)
PMV BLD AUTO: 10.6 FL (ref 9.2–12.9)
POCT GLUCOSE: 145 MG/DL (ref 70–110)
POCT GLUCOSE: 89 MG/DL (ref 70–110)
POCT GLUCOSE: 95 MG/DL (ref 70–110)
POTASSIUM SERPL-SCNC: 4.9 MMOL/L (ref 3.5–5.1)
PROT SERPL-MCNC: 6.3 G/DL (ref 6–8.4)
RBC # BLD AUTO: 4.28 M/UL (ref 4–5.4)
SODIUM SERPL-SCNC: 138 MMOL/L (ref 136–145)
VANCOMYCIN TROUGH SERPL-MCNC: 13.2 UG/ML (ref 10–22)
WBC # BLD AUTO: 6.26 K/UL (ref 3.9–12.7)

## 2019-12-16 PROCEDURE — 12000002 HC ACUTE/MED SURGE SEMI-PRIVATE ROOM: Mod: HCNC

## 2019-12-16 PROCEDURE — 85025 COMPLETE CBC W/AUTO DIFF WBC: CPT | Mod: HCNC

## 2019-12-16 PROCEDURE — 94640 AIRWAY INHALATION TREATMENT: CPT | Mod: HCNC

## 2019-12-16 PROCEDURE — 25000242 PHARM REV CODE 250 ALT 637 W/ HCPCS: Mod: HCNC | Performed by: INTERNAL MEDICINE

## 2019-12-16 PROCEDURE — 80202 ASSAY OF VANCOMYCIN: CPT | Mod: HCNC

## 2019-12-16 PROCEDURE — 63600175 PHARM REV CODE 636 W HCPCS: Mod: HCNC | Performed by: FAMILY MEDICINE

## 2019-12-16 PROCEDURE — 36415 COLL VENOUS BLD VENIPUNCTURE: CPT | Mod: HCNC

## 2019-12-16 PROCEDURE — 80053 COMPREHEN METABOLIC PANEL: CPT | Mod: HCNC

## 2019-12-16 PROCEDURE — 25000003 PHARM REV CODE 250: Mod: HCNC | Performed by: INTERNAL MEDICINE

## 2019-12-16 PROCEDURE — 94761 N-INVAS EAR/PLS OXIMETRY MLT: CPT | Mod: HCNC

## 2019-12-16 PROCEDURE — 25000003 PHARM REV CODE 250: Mod: HCNC | Performed by: FAMILY MEDICINE

## 2019-12-16 PROCEDURE — 27000221 HC OXYGEN, UP TO 24 HOURS: Mod: HCNC

## 2019-12-16 PROCEDURE — 99232 PR SUBSEQUENT HOSPITAL CARE,LEVL II: ICD-10-PCS | Mod: S$GLB,,, | Performed by: INTERNAL MEDICINE

## 2019-12-16 PROCEDURE — 25000003 PHARM REV CODE 250: Mod: HCNC | Performed by: NURSE PRACTITIONER

## 2019-12-16 PROCEDURE — 99232 SBSQ HOSP IP/OBS MODERATE 35: CPT | Mod: S$GLB,,, | Performed by: INTERNAL MEDICINE

## 2019-12-16 RX ADMIN — VANCOMYCIN HYDROCHLORIDE 1500 MG: 1.5 INJECTION, POWDER, LYOPHILIZED, FOR SOLUTION INTRAVENOUS at 09:12

## 2019-12-16 RX ADMIN — NYSTATIN AND TRIAMCINOLONE ACETONIDE: 100000; 1 CREAM TOPICAL at 03:12

## 2019-12-16 RX ADMIN — TRAZODONE HYDROCHLORIDE 150 MG: 50 TABLET ORAL at 10:12

## 2019-12-16 RX ADMIN — PANTOPRAZOLE SODIUM 40 MG: 40 TABLET, DELAYED RELEASE ORAL at 08:12

## 2019-12-16 RX ADMIN — ATORVASTATIN CALCIUM 20 MG: 20 TABLET, FILM COATED ORAL at 08:12

## 2019-12-16 RX ADMIN — GABAPENTIN 800 MG: 400 CAPSULE ORAL at 08:12

## 2019-12-16 RX ADMIN — FERROUS SULFATE TAB EC 325 MG (65 MG FE EQUIVALENT) 325 MG: 325 (65 FE) TABLET DELAYED RESPONSE at 08:12

## 2019-12-16 RX ADMIN — CETIRIZINE HYDROCHLORIDE 10 MG: 10 TABLET, FILM COATED ORAL at 08:12

## 2019-12-16 RX ADMIN — FUROSEMIDE 20 MG: 20 TABLET ORAL at 09:12

## 2019-12-16 RX ADMIN — ACETAMINOPHEN 1000 MG: 500 TABLET ORAL at 02:12

## 2019-12-16 RX ADMIN — THERA TABS 1 TABLET: TAB at 08:12

## 2019-12-16 RX ADMIN — ALBUTEROL SULFATE 2.5 MG: 2.5 SOLUTION RESPIRATORY (INHALATION) at 07:12

## 2019-12-16 RX ADMIN — GABAPENTIN 800 MG: 400 CAPSULE ORAL at 03:12

## 2019-12-16 RX ADMIN — NYSTATIN AND TRIAMCINOLONE ACETONIDE: 100000; 1 CREAM TOPICAL at 10:12

## 2019-12-16 RX ADMIN — GABAPENTIN 800 MG: 400 CAPSULE ORAL at 10:12

## 2019-12-16 RX ADMIN — LISINOPRIL 20 MG: 10 TABLET ORAL at 08:12

## 2019-12-16 RX ADMIN — ALBUTEROL SULFATE 2.5 MG: 2.5 SOLUTION RESPIRATORY (INHALATION) at 08:12

## 2019-12-16 RX ADMIN — APIXABAN 5 MG: 2.5 TABLET, FILM COATED ORAL at 08:12

## 2019-12-16 RX ADMIN — DOXYCYCLINE HYCLATE 100 MG: 100 TABLET, COATED ORAL at 08:12

## 2019-12-16 RX ADMIN — FLUTICASONE FUROATE AND VILANTEROL TRIFENATATE 1 PUFF: 100; 25 POWDER RESPIRATORY (INHALATION) at 08:12

## 2019-12-16 RX ADMIN — CLONAZEPAM 0.5 MG: 0.5 TABLET ORAL at 12:12

## 2019-12-16 RX ADMIN — HYDROCODONE BITARTRATE AND ACETAMINOPHEN 1 TABLET: 10; 325 TABLET ORAL at 10:12

## 2019-12-16 RX ADMIN — DOXYCYCLINE HYCLATE 100 MG: 100 TABLET, COATED ORAL at 10:12

## 2019-12-16 RX ADMIN — DULOXETINE 30 MG: 30 CAPSULE, DELAYED RELEASE ORAL at 08:12

## 2019-12-16 RX ADMIN — APIXABAN 5 MG: 2.5 TABLET, FILM COATED ORAL at 10:12

## 2019-12-16 RX ADMIN — VANCOMYCIN HYDROCHLORIDE 1500 MG: 1.5 INJECTION, POWDER, LYOPHILIZED, FOR SOLUTION INTRAVENOUS at 10:12

## 2019-12-16 RX ADMIN — OXYCODONE HYDROCHLORIDE AND ACETAMINOPHEN 1000 MG: 500 TABLET ORAL at 10:12

## 2019-12-16 RX ADMIN — NYSTATIN AND TRIAMCINOLONE ACETONIDE: 100000; 1 CREAM TOPICAL at 09:12

## 2019-12-16 RX ADMIN — ALBUTEROL SULFATE 2.5 MG: 2.5 SOLUTION RESPIRATORY (INHALATION) at 03:12

## 2019-12-16 NOTE — PLAN OF CARE
Pt aaox4 left lower abd with redness and serous drainage noted. Pt iv patent and blood sugars checked through out day. Telemetry shows sinus moody with hr in 40's Md is aware and made rounds this pm. Pt refuses teds and scd's does have Pharm of eliquis. Vanco trough done today with in range. 02 on Pt is DNR. Call bell in reach bed low and hourly rounds maintained.

## 2019-12-16 NOTE — PLAN OF CARE
12/15/19 1949   Patient Assessment/Suction   Level of Consciousness (AVPU) alert   Respiratory Effort Unlabored   PRE-TX-O2   O2 Device (Oxygen Therapy) nasal cannula   $ Is the patient on Low Flow Oxygen? Yes   Flow (L/min) 3   SpO2 97 %   Pulse Oximetry Type Intermittent   $ Pulse Oximetry - Multiple Charge Pulse Oximetry - Multiple   Pulse 65   Resp 18   Aerosol Therapy   $ Aerosol Therapy Charges Refused   Respiratory Treatment Status (SVN) refused

## 2019-12-16 NOTE — PLAN OF CARE
12/16/19 0826   Patient Assessment/Suction   Level of Consciousness (AVPU) alert   Respiratory Effort Unlabored   Expansion/Accessory Muscles/Retractions expansion symmetric   All Lung Fields Breath Sounds diminished   Cough Frequency infrequent   PRE-TX-O2   O2 Device (Oxygen Therapy) nasal cannula   $ Is the patient on Low Flow Oxygen? Yes   Flow (L/min) 3   SpO2 97 %   Pulse Oximetry Type Intermittent   $ Pulse Oximetry - Multiple Charge Pulse Oximetry - Multiple   Pulse (!) 56   Resp (!) 24   Aerosol Therapy   $ Aerosol Therapy Charges Aerosol Treatment   Respiratory Treatment Status (SVN) given   Treatment Route (SVN) mask   Patient Position (SVN) HOB elevated   Post Treatment Assessment (SVN) breath sounds unchanged   Signs of Intolerance (SVN) none   Breath Sounds Post-Respiratory Treatment   Throughout All Fields Post-Treatment All Fields   Throughout All Fields Post-Treatment no change   Post-treatment Heart Rate (beats/min) 58   Post-treatment Resp Rate (breaths/min) 18

## 2019-12-16 NOTE — PLAN OF CARE
Pt is AAOx4.  Pt c/o pain, prn medication given, pt tolerated well.  IV saline locked, infusing abx as ordered.  Telemetry monitored, afib-moody.  Pacer leads placed on pt per cardio as needed.  BG monitored, SSI given prn as ordered.  Abd is still red, tenderness to site.  VSS, in NAD, pt remains afebrile.  Pt remains free from injury. Bed in low position, wheels locked, call light within reach.  Pt verbalized understanding of POC.  Hourly/ Q 2 hr rounding performed.

## 2019-12-16 NOTE — CONSULTS
Well known to wound care from previous admits. RLQ pendulous abdominal fold is red and draining. Dr. Luecro has ordered care.

## 2019-12-16 NOTE — PROGRESS NOTES
"Summary:  12/13/2019 (1st Attempt) Attempted to contact patient in monitoring status to follow up for OPCM. No answer; left message requesting a return call. Will attempt to contact patient early next week if no return call prior. Emilie Ma RN-OPCDAVE    12/16/2019 (Chart Review) Reviewed chart prior to attempting second attempt to follow up. Patient admitted to [facility] through the ED on 12/14/19 with sepsis due to cellulitis. Patient remains on IV antibiotics at this time. Inpatient SW notes indicate that discharge plan will be to discharge home with resumption of [facility] services when patient is ready to be discharged. Will monitor patient for discharge status and check back next week to insure that needs are being met. If no additional needs at next call, will close case. Emilie Ma RN-OPCM    12/20/2019 (2nd Attempt - Patient Requested Call Back After the Holidays) RN-CM contacted patient to complete follow up for OPCM. Patient discharged on 12/17/19 from [facility] after a 3 day hospital stay for sepsis from abdominal wall cellulitis. Patient states that she was discharged home with [facility] and oral antiobiotics. States that she is feeling better and has HH services in place. Patient states that "things are going okay right now" and requested that I call her back after the holidays. Will call patient back in about 2 weeks, after the holidays to check status. If no additional needs at that time, will close case. Emilie Ma RN-OPCM    01/02/2020 (Case Closure) RN-CM attempted to contact patient to follow up follow up for OPCM. Patient was not home and spoke with patient's daughter, Maye Tirado. Maye and her daughter, Nemo live with patient. Maye is primary caregiver for patient and holds Medical POA for patient. Patient has given this RN-CM permission to speak with Maye in the event that I was not able to reach her. Maye reports that patient is " doing well. States that [facility] is seeing patient and that her needs are being met. Maye denies any additional patient needs at this time. As nursing-identified, patient-centered goals have been met, I will close case today. Provided my name and contact information and asked Maye to let patient know that I had called to follow up and if there was anything I could do or if any additional needs arose in the future, not to hesitate to contact me again; verbalized understanding. Emilie Ma, RN-OPCM

## 2019-12-16 NOTE — PROGRESS NOTES
Pharmacokinetic Assessment Follow Up: IV Vancomycin    Vancomycin serum concentration assessment(s):    The trough level was drawn correctly and can be used to guide therapy at this time. The measurement is within the desired definitive target range of 10 to 15 mcg/mL.    Vancomycin Regimen Plan:    The trough level drawn at 0829 on 12/16 was 13.2 mcg/mL and within the desired target range of 10-15 mcg/mL for suspected skin & soft tissue infection. Continue regimen to Vancomycin 1500 mg IV every 12 hours with next serum trough concentration measured at approximately 2030 prior to 4th dose on 12/17.     Drug levels (last 3 results):  Recent Labs   Lab Result Units 12/16/19  0829   Vancomycin-Trough ug/mL 13.2       Pharmacy will continue to follow and monitor vancomycin.    Please contact pharmacy at extension [phone] for questions regarding this assessment.    Thank you for the consult,   María Ramirez, PharmD       Patient brief summary:  Nelly Tian is a 69 y.o. female initiated on antimicrobial therapy with IV Vancomycin for treatment of skin & soft tissue infection.    The patient's current regimen is vancomycin 1500 mg IV every 12 hours.    Drug Allergies:   Review of patient's allergies indicates:   Allergen Reactions    Dilaudid [hydromorphone] Anaphylaxis     Other reaction(s): Anaphylaxis  Other reaction(s): Unknown    Zyvox [linezolid in dextrose 5%] Shortness Of Breath       Actual Body Weight:   107.3 kg    Renal Function:   Estimated Creatinine Clearance: 98.7 mL/min (based on SCr of 0.7 mg/dL).,     CBC (last 72 hours):  Recent Labs   Lab Result Units 12/14/19  1548 12/14/19  1928 12/15/19  0127 12/16/19  0551   WBC K/uL 20.10*  --  17.66* 6.26   Hemoglobin g/dL 12.2  --  11.2* 11.0*   Hemoglobin A1C %  --  5.3  --   --    Hematocrit % 41.3  --  37.8 37.9   Platelets K/uL 211  --  197 189   Gran% % 85.1*  --  80.6* 46.0   Lymph% % 5.1*  --  9.6* 30.0   Mono% % 8.9  --  9.1 20.1*   Eosinophil% %  0.2  --  0.1 2.9   Basophil% % 0.2  --  0.3 0.5   Differential Method  Automated  --  Automated Automated       Metabolic Panel (last 72 hours):  Recent Labs   Lab Result Units 12/14/19  1548 12/14/19  1622 12/15/19  0127 12/16/19  0551   Sodium mmol/L 135*  --  136 138   Potassium mmol/L 5.1  --  4.5 4.9   Chloride mmol/L 98  --  101 102   CO2 mmol/L 26  --  28 30*   Glucose mg/dL 76  --  76 87   Glucose, UA   --  Negative  --   --    BUN, Bld mg/dL 14  --  12 13   Creatinine mg/dL 0.7  --  0.7 0.7   Albumin g/dL 4.0  --  3.1* 3.1*   Total Bilirubin mg/dL 0.7  --  0.4 0.3   Alkaline Phosphatase U/L 105  --  87 92   AST U/L 29  --  18 23   ALT U/L 31  --  25 23       Vancomycin Administrations:  vancomycin given in the last 96 hours                     vancomycin 1.5 g in dextrose 5 % 250 mL IVPB (ready to mix) (mg) 1,500 mg New Bag 12/15/19 2121    vancomycin 1.5 g in dextrose 5 % 250 mL IVPB (ready to mix) (mg) 1,500 mg New Bag 12/15/19 0849    vancomycin (VANCOCIN) 2,000 mg in dextrose 5 % 500 mL IVPB (mg) 2,000 mg New Bag 12/14/19 2004                      Microbiologic Results:  Microbiology Results (last 7 days)       Procedure Component Value Units Date/Time    Culture, Anaerobe [[id number]] Collected:  12/15/19 1130    Order Status:  Completed Specimen:  Skin from Abdomen Updated:  12/16/19 0848     Anaerobic Culture Culture in progress    Blood culture x two cultures. Draw prior to antibiotics. [[id number]] Collected:  12/14/19 1548    Order Status:  Completed Specimen:  Blood from Peripheral, Left  Wrist Updated:  12/16/19 0612     Blood Culture, Routine No Growth to date      No Growth to date    Narrative:       Aerobic and anaerobic    Blood culture x two cultures. Draw prior to antibiotics. [[id number]] Collected:  12/14/19 1548    Order Status:  Completed Specimen:  Blood Updated:  12/16/19 0612     Blood Culture, Routine No Growth to date      No Growth to date    Narrative:       Aerobic and anaerobic     Aerobic culture [[id number]] Collected:  12/15/19 1130    Order Status:  Sent Specimen:  Skin from Abdomen Updated:  12/15/19 1431    Culture, Respiratory with Gram Stain [[id number]]     Order Status:  No result Specimen:  Respiratory from Sputum, Expectorated     Influenza A & B by Molecular [[id number]] Collected:  12/14/19 1531    Order Status:  Completed Specimen:  Nasopharyngeal Swab Updated:  12/14/19 1559     Influenza A, Molecular Negative     Influenza B, Molecular Negative     Flu A & B Source Nasal swab

## 2019-12-16 NOTE — PROGRESS NOTES
Consult Note  Infectious Disease    Reason for Consult:  Cellulitis of abd wall    HPI: Nelly Tian is a   69 y.o. female with whom I am familiar from prior consultations. She has been on suppression with daily cefadroxil for recurrent abd wall cellulitis. The large ulcerations that she had 6 months ago are healed. She is preparing for hernia repair? Panniculectomy? In January. She developed bronchitis about 2 weeks ago, along with other family members who had URIs and she has been unable to improve. She saw Dr. Bird 12/2 and was given prednisone x 3 days and a new rx of cefadroxil. She continued to cough, with purulent sputum and yesterday she noted acute onset of left sided abdominal wall erythema and fever to 101. She has had no injury, new skin lesion or recurrence of candidiasis. She does continue to smoke. She did have a flu vaccine.     12/16: interim reviewed. Afebrile and WBC have returned to normal. Blood cultures negative. Clear fluid from abd wall culture is negative so far. She was not given a sputum container so the sputum culture has not been collected yet. Her abdominal wall erythema is improved. She is breathing more easily but have the wet cough still. She does have a nebulizer at home.     EXAM & DIAGNOSTICS REVIEWED:   Vitals:     Temp:  [96 °F (35.6 °C)-98.4 °F (36.9 °C)]   Temp: 98 °F (36.7 °C) (12/16/19 1518)  Pulse: (!) 41 (12/16/19 1537)  Resp: 18 (12/16/19 1537)  BP: (!) 133/55 (12/16/19 1518)  SpO2: 98 % (12/16/19 1537)    Intake/Output Summary (Last 24 hours) at 12/16/2019 1542  Last data filed at 12/16/2019 0600  Gross per 24 hour   Intake 1030 ml   Output --   Net 1030 ml       General:  In NAD. Alert and attentive, cooperative, comfortable, non toxic  Eyes:  Anicteric, PERRL, EOMI  ENT:  No ulcers, exudates, thrush, nares patent, dentition is fair  Neck:  supple, no masses or adenopathy appreciated  Lungs: Clear lungs,  wet cough  Heart:  iRRR, slow, no gallop/murmur/rub  noted  Abd:  Soft, NT, ND, normal BS, no masses or organomegaly appreciated.   Abdominal wall over large left sided hernia forms large pannus (smaller than  May) which is erythema along the outer edge with resolution of the red streaks emanating proximally. The intensity of the abdominal wall is improved.  There are no abscesses, bullae, and there is no crepitance to palpation and no ischemic change. The previously seen candidiasis in may is under excellent control at this time. There is one pinpoint of serous, non purulent drainage medial to long axis of pannus. There is no extension of cellulitis to flank or to the right side of her abdomen  :  Voids   Musc:  Joints without effusion, swelling, erythema, synovitis, muscle wasting.   Skin:  No rashes. But extensive petechiae of right lower leg/foot which she indicates is not new  Wound:   Neuro:   lert, attentive, speech fluent, face symmetric, moves all extremities, no focal weakness. Ambulatory  Psych:  Calm, cooperative     Extrem: No edema, erythema, phlebitis, cellulitis, warm and well perfused  VAD:       Isolation:      Lines/Tubes/Drains:    General Labs reviewed:  Recent Labs   Lab 12/14/19  1548 12/15/19  0127 12/16/19  0551   WBC 20.10* 17.66* 6.26   HGB 12.2 11.2* 11.0*   HCT 41.3 37.8 37.9    197 189       Recent Labs   Lab 12/14/19  1548 12/15/19  0127 12/16/19  0551   * 136 138   K 5.1 4.5 4.9   CL 98 101 102   CO2 26 28 30*   BUN 14 12 13   CREATININE 0.7 0.7 0.7   CALCIUM 9.7 8.7 9.3   PROT 7.5 6.2 6.3   BILITOT 0.7 0.4 0.3   ALKPHOS 105 87 92   ALT 31 25 23   AST 29 18 23           Micro:  Microbiology Results (last 7 days)     Procedure Component Value Units Date/Time    Aerobic culture [[id number]] Collected:  12/15/19 1130    Order Status:  Completed Specimen:  Skin from Abdomen Updated:  12/16/19 1323     Aerobic Bacterial Culture No significant isolate to date    Culture, Anaerobe [[id number]] Collected:  12/15/19 1130    Order  Status:  Completed Specimen:  Skin from Abdomen Updated:  12/16/19 0848     Anaerobic Culture Culture in progress    Blood culture x two cultures. Draw prior to antibiotics. [[id number]] Collected:  12/14/19 1548    Order Status:  Completed Specimen:  Blood from Peripheral, Left  Wrist Updated:  12/16/19 0612     Blood Culture, Routine No Growth to date      No Growth to date    Narrative:       Aerobic and anaerobic    Blood culture x two cultures. Draw prior to antibiotics. [[id number]] Collected:  12/14/19 1548    Order Status:  Completed Specimen:  Blood Updated:  12/16/19 0612     Blood Culture, Routine No Growth to date      No Growth to date    Narrative:       Aerobic and anaerobic    Culture, Respiratory with Gram Stain [[id number]]     Order Status:  No result Specimen:  Respiratory from Sputum, Expectorated     Influenza A & B by Molecular [[id number]] Collected:  12/14/19 1531    Order Status:  Completed Specimen:  Nasopharyngeal Swab Updated:  12/14/19 1559     Influenza A, Molecular Negative     Influenza B, Molecular Negative     Flu A & B Source Nasal swab        Imaging Reviewed:   CXR clear      Cardiology:    IMPRESSION & PLAN   1. Abdominal wall cellulitis, involving large pannus over hernia  2. Bronchitis, smoker  3. Bradycardia, chronic atrial fib      Recommendations:  vanc for skin and doxy for bronchitis until discharge and then po doxy for a week and resume cefadroxil (I gave her a rx) bid for a week then daily  Sputum culture, aerosol bronchodilators

## 2019-12-17 VITALS
OXYGEN SATURATION: 97 % | TEMPERATURE: 98 F | BODY MASS INDEX: 35.77 KG/M2 | DIASTOLIC BLOOD PRESSURE: 63 MMHG | HEART RATE: 91 BPM | HEIGHT: 68 IN | RESPIRATION RATE: 18 BRPM | WEIGHT: 236 LBS | SYSTOLIC BLOOD PRESSURE: 134 MMHG

## 2019-12-17 LAB
AORTIC ROOT ANNULUS: 3.04 CM
AORTIC VALVE CUSP SEPERATION: 2.08 CM
AV INDEX (PROSTH): 0.65
AV MEAN GRADIENT: 8 MMHG
AV PEAK GRADIENT: 14 MMHG
AV VALVE AREA: 2.49 CM2
AV VELOCITY RATIO: 0.73
BSA FOR ECHO PROCEDURE: 2.27 M2
CV ECHO LV RWT: 0.39 CM
DOP CALC AO PEAK VEL: 1.9 M/S
DOP CALC AO VTI: 41.14 CM
DOP CALC LVOT AREA: 3.8 CM2
DOP CALC LVOT DIAMETER: 2.21 CM
DOP CALC LVOT PEAK VEL: 1.38 M/S
DOP CALC LVOT STROKE VOLUME: 102.48 CM3
DOP CALCLVOT PEAK VEL VTI: 26.73 CM
E WAVE DECELERATION TIME: 197.74 MSEC
ECHO LV POSTERIOR WALL: 1.17 CM (ref 0.6–1.1)
FRACTIONAL SHORTENING: 17 % (ref 28–44)
INTERVENTRICULAR SEPTUM: 0.97 CM (ref 0.6–1.1)
IVRT: 0.1 MSEC
LEFT ATRIUM SIZE: 5.29 CM
LEFT INTERNAL DIMENSION IN SYSTOLE: 5 CM (ref 2.1–4)
LEFT VENTRICLE DIASTOLIC VOLUME INDEX: 83.19 ML/M2
LEFT VENTRICLE DIASTOLIC VOLUME: 182.43 ML
LEFT VENTRICLE MASS INDEX: 124 G/M2
LEFT VENTRICLE SYSTOLIC VOLUME INDEX: 53.9 ML/M2
LEFT VENTRICLE SYSTOLIC VOLUME: 118.14 ML
LEFT VENTRICULAR INTERNAL DIMENSION IN DIASTOLE: 6.04 CM (ref 3.5–6)
LEFT VENTRICULAR MASS: 272.66 G
MV MEAN GRADIENT: 2 MMHG
MV STENOSIS PRESSURE HALF TIME: 60 MS
MV VALVE AREA P 1/2 METHOD: 3.67 CM2
PISA TR MAX VEL: 3.25 M/S
POCT GLUCOSE: 123 MG/DL (ref 70–110)
PV PEAK VELOCITY: 0.75 CM/S
RA PRESSURE: 15 MMHG
RIGHT VENTRICULAR END-DIASTOLIC DIMENSION: 3.49 CM
TDI LATERAL: 0.11 M/S
TDI SEPTAL: 0.09 M/S
TDI: 0.1 M/S
TR MAX PG: 42 MMHG
TRICUSPID ANNULAR PLANE SYSTOLIC EXCURSION: 2.13 CM
TV REST PULMONARY ARTERY PRESSURE: 57 MMHG

## 2019-12-17 PROCEDURE — 63600175 PHARM REV CODE 636 W HCPCS: Mod: HCNC | Performed by: FAMILY MEDICINE

## 2019-12-17 PROCEDURE — 25000242 PHARM REV CODE 250 ALT 637 W/ HCPCS: Mod: HCNC | Performed by: INTERNAL MEDICINE

## 2019-12-17 PROCEDURE — 99231 PR SUBSEQUENT HOSPITAL CARE,LEVL I: ICD-10-PCS | Mod: S$GLB,,, | Performed by: INTERNAL MEDICINE

## 2019-12-17 PROCEDURE — 25000003 PHARM REV CODE 250: Mod: HCNC | Performed by: INTERNAL MEDICINE

## 2019-12-17 PROCEDURE — 94761 N-INVAS EAR/PLS OXIMETRY MLT: CPT | Mod: HCNC

## 2019-12-17 PROCEDURE — 99231 SBSQ HOSP IP/OBS SF/LOW 25: CPT | Mod: S$GLB,,, | Performed by: INTERNAL MEDICINE

## 2019-12-17 PROCEDURE — 94640 AIRWAY INHALATION TREATMENT: CPT | Mod: HCNC

## 2019-12-17 PROCEDURE — 27000221 HC OXYGEN, UP TO 24 HOURS: Mod: HCNC

## 2019-12-17 PROCEDURE — 25000003 PHARM REV CODE 250: Mod: HCNC | Performed by: FAMILY MEDICINE

## 2019-12-17 PROCEDURE — 25000003 PHARM REV CODE 250: Mod: HCNC | Performed by: NURSE PRACTITIONER

## 2019-12-17 RX ORDER — DOXYCYCLINE HYCLATE 100 MG
100 TABLET ORAL EVERY 12 HOURS
Qty: 14 TABLET | Refills: 0 | Status: SHIPPED | OUTPATIENT
Start: 2019-12-17 | End: 2019-12-24

## 2019-12-17 RX ORDER — CEFADROXIL 500 MG/1
CAPSULE ORAL
Qty: 60 CAPSULE | Refills: 5
Start: 2019-12-17 | End: 2020-03-10 | Stop reason: ALTCHOICE

## 2019-12-17 RX ADMIN — GABAPENTIN 800 MG: 400 CAPSULE ORAL at 08:12

## 2019-12-17 RX ADMIN — ALBUTEROL SULFATE 2.5 MG: 2.5 SOLUTION RESPIRATORY (INHALATION) at 07:12

## 2019-12-17 RX ADMIN — LISINOPRIL 20 MG: 10 TABLET ORAL at 08:12

## 2019-12-17 RX ADMIN — FLUTICASONE PROPIONATE 50 MCG: 50 SPRAY, METERED NASAL at 08:12

## 2019-12-17 RX ADMIN — ATORVASTATIN CALCIUM 20 MG: 20 TABLET, FILM COATED ORAL at 08:12

## 2019-12-17 RX ADMIN — CETIRIZINE HYDROCHLORIDE 10 MG: 10 TABLET, FILM COATED ORAL at 08:12

## 2019-12-17 RX ADMIN — CLONAZEPAM 0.5 MG: 0.5 TABLET ORAL at 12:12

## 2019-12-17 RX ADMIN — NYSTATIN AND TRIAMCINOLONE ACETONIDE: 100000; 1 CREAM TOPICAL at 08:12

## 2019-12-17 RX ADMIN — DOXYCYCLINE HYCLATE 100 MG: 100 TABLET, COATED ORAL at 08:12

## 2019-12-17 RX ADMIN — ACETAMINOPHEN 1000 MG: 500 TABLET ORAL at 02:12

## 2019-12-17 RX ADMIN — FERROUS SULFATE TAB EC 325 MG (65 MG FE EQUIVALENT) 325 MG: 325 (65 FE) TABLET DELAYED RESPONSE at 08:12

## 2019-12-17 RX ADMIN — THERA TABS 1 TABLET: TAB at 08:12

## 2019-12-17 RX ADMIN — DULOXETINE 30 MG: 30 CAPSULE, DELAYED RELEASE ORAL at 08:12

## 2019-12-17 RX ADMIN — APIXABAN 5 MG: 2.5 TABLET, FILM COATED ORAL at 08:12

## 2019-12-17 RX ADMIN — FLUTICASONE FUROATE AND VILANTEROL TRIFENATATE 1 PUFF: 100; 25 POWDER RESPIRATORY (INHALATION) at 07:12

## 2019-12-17 RX ADMIN — PANTOPRAZOLE SODIUM 40 MG: 40 TABLET, DELAYED RELEASE ORAL at 08:12

## 2019-12-17 RX ADMIN — VANCOMYCIN HYDROCHLORIDE 1500 MG: 1.5 INJECTION, POWDER, LYOPHILIZED, FOR SOLUTION INTRAVENOUS at 10:12

## 2019-12-17 NOTE — SUBJECTIVE & OBJECTIVE
Interval History:  Continues to have clears serous drainage from the affected area on her abdominal wall.  She is afebrile.  She is tolerating antibiotics.  She remains with bradycardia but is asymptomatic.    Review of Systems   Constitutional: Negative for chills and fever.   Respiratory: Negative for cough and shortness of breath.    Cardiovascular: Negative for chest pain.   Gastrointestinal: Negative for abdominal pain, nausea and vomiting.     Objective:     Vital Signs (Most Recent):  Temp: 97.7 °F (36.5 °C) (12/16/19 2010)  Pulse: (!) 55 (12/16/19 2010)  Resp: 18 (12/16/19 2010)  BP: 136/65 (12/16/19 2010)  SpO2: 95 % (12/16/19 2010) Vital Signs (24h Range):  Temp:  [96 °F (35.6 °C)-98.4 °F (36.9 °C)] 97.7 °F (36.5 °C)  Pulse:  [41-64] 55  Resp:  [16-24] 18  SpO2:  [93 %-100 %] 95 %  BP: (103-136)/(50-65) 136/65     Weight: 107 kg (236 lb)  Body mass index is 35.88 kg/m².    Intake/Output Summary (Last 24 hours) at 12/16/2019 2055  Last data filed at 12/16/2019 1800  Gross per 24 hour   Intake 1580 ml   Output --   Net 1580 ml      Physical Exam   Constitutional: She is oriented to person, place, and time. No distress.   HENT:   Mouth/Throat: No oropharyngeal exudate.   Eyes: Right eye exhibits no discharge. Left eye exhibits no discharge.   Neck: No JVD present.   Cardiovascular: Normal rate and regular rhythm.   Pulmonary/Chest: Effort normal and breath sounds normal. She has no wheezes.   Abdominal: Soft. Bowel sounds are normal. She exhibits no distension and no mass. There is no tenderness.   There is a large hernia on the left side of her abdomen.  The skin overlying it has erythema and some warmth.  Reportedly the redness is less than on day one of admission.   Musculoskeletal: She exhibits no edema.   Neurological: She is alert and oriented to person, place, and time.   Skin: Skin is warm and dry. She is not diaphoretic.       Significant Labs: All pertinent labs within the past 24 hours have been  reviewed.    Significant Imaging: I have reviewed all pertinent imaging results/findings within the past 24 hours.

## 2019-12-17 NOTE — PROGRESS NOTES
[facility]  Cardiology  Progress Note    Patient Name: Nelly Tian  MRN: [id number]  Admission Date: 12/14/2019  Hospital Length of Stay: 3 days  Code Status: DNR   Attending Physician: Diaz Pa MD   Primary Care Physician: Constantine Bird MD  Expected Discharge Date:   Principal Problem:Cellulitis of abdominal wall    Subjective:     Hospital Course: 69 y.o. female with h/o AF, HTN, HLD, DM, COPD, chronic cellulitis on abd, was admitted for cellulitis of abd.   ECG monitor showed AF with slow HR around 40, Bp 97/42, w/o symptom.    Interval History: In AF, HR 60-70s in AM when I'm rounding; low at 40s. Pt denies cardiac symptom.    Review of Systems  Objective:     Vital Signs (Most Recent):  Temp: 98.1 °F (36.7 °C) (12/17/19 0751)  Pulse: (!) 41 (12/17/19 0753)  Resp: 18 (12/17/19 0753)  BP: (!) 119/56 (12/17/19 0751)  SpO2: 100 % (12/17/19 0753) Vital Signs (24h Range):  Temp:  [97.7 °F (36.5 °C)-98.5 °F (36.9 °C)] 98.1 °F (36.7 °C)  Pulse:  [41-58] 41  Resp:  [16-18] 18  SpO2:  [93 %-100 %] 100 %  BP: (119-136)/(55-65) 119/56   Weight: 107 kg (236 lb)  Body mass index is 35.88 kg/m².  SpO2: 100 %  O2 Device (Oxygen Therapy): nasal cannula    Intake/Output Summary (Last 24 hours) at 12/17/2019 0931  Last data filed at 12/17/2019 0800  Gross per 24 hour   Intake 1460 ml   Output --   Net 1460 ml     Lines/Drains/Airways     Peripheral Intravenous Line                 Peripheral IV - Single Lumen 12/16/19 1935 22 G Anterior;Left Forearm less than 1 day               Physical Exam  Gen: Comfort, sitting on bed, in no distress; obese  Skin: warm and dry; redness on L lower abd wall  Lymph: no lymphadenopathy detected  HEENT: NC/AT  Neck: supple, no JVD/bruit  Chest: no deformity, equal movement b/l  Lung: CTA b/l  Heart: Irregular, S1/S2 +, no M/G/R  Abd: BS+, S, NT/ND  Ext: no deformity, no pretibial edema  Pulse: b/l radial 2+  Neuro: AAOx3     Cardiographics  ECG 12/15/19: AF with  slow HR, 41 bpm, low voltage, poor R progression.  Echo 12/16/19:   · Eccentric left ventricular hypertrophy.  · Moderate left ventricular enlargement.  · Moderately decreased left ventricular systolic function. The estimated ejection fraction is 35%  · Grade III (severe) left ventricular diastolic dysfunction consistent with restrictive physiology.  · Moderate right ventricular enlargement.  · Low normal right ventricular systolic function.  · Moderate left atrial enlargement.  · Moderate right atrial enlargement.  · Moderate-to-severe mitral regurgitation.  · Moderate to severe tricuspid regurgitation.  · Moderate pulmonary hypertension present.  · Moderate pulmonic regurgitation.  · Elevated central venous pressure (15 mm Hg).  · The estimated PA systolic pressure is 57 mm Hg  Echocardiogram 2/1/18:    1 - Low normal to mildly depressed left ventricular systolic function (EF 50-55%).     2 - The estimated PA systolic pressure is greater than 22 mmHg.     3 - Mild aortic regurgitation.     4 - Mild mitral regurgitation.   [facility] 11/24/14:  - Left Main Coronary Artery: The LM is normal. There is BRIGID 3 flow.     - Left Anterior Descending Artery: The LAD has luminal irregularities. There is BRIGID 3 flow.     - Left Circumflex Artery: The LCX has luminal irregularities. There is BRIGID 3 flow.     - Right Coronary Artery: The RCA has luminal irregularities. There is BRIGID 3 flow. Small non-dominant artery     Imaging  Chest x-ray 12/14/19: No acute cardiopulmonary abnormality appreciated radiographically.  No interval detrimental change in the radiographic appearance of the chest when compared to the previous study.     Lab Review   Lab Results   Component Value Date    WBC 6.26 12/16/2019    HGB 11.0 (L) 12/16/2019    HCT 37.9 12/16/2019    MCV 89 12/16/2019     12/16/2019     BMP  Lab Results   Component Value Date     12/16/2019    K 4.9 12/16/2019     12/16/2019    CO2 30 (H) 12/16/2019    BUN 13  12/16/2019    CREATININE 0.7 12/16/2019    CALCIUM 9.3 12/16/2019    ANIONGAP 6 (L) 12/16/2019    ESTGFRAFRICA >60 12/16/2019    EGFRNONAA >60 12/16/2019   Results for FREDY GIBBS (MRN [id number]) as of 12/15/2019 13:20    Ref. Range 12/14/2019 19:28 12/14/2019 19:28 12/15/2019 01:27   Troponin I Latest Ref Range: 0- 0.026 ng/mL <0.006 <0.006 <0.006       Assessment:   A-fib with slow HR, asymptomatic; Trop neg  Cellulitis  HFrEF 35% via Echo 12/16/19, 50-55% 2/1/18  HTN  DM  COPD   Plan:   Asymptomatic.  Continue monitor ECG.  Avoid any meds that potentially having AVB function; if symptomatic, likely need pacer. Currently no indication.  Encourage out of bed and watch HR response with maximum physical activity.      Love Ibrahim MD  Cardiology  [facility]    When walking around, HR up to 91 bpm, reported feeling better.  Echo reports showed up late and pt was already d/c home. Will let her having appointment and discuss about decreased LVEF ASAP.  I reviewed Echo imagines (12/16/2019), poor quality, LV EF about 50%, no major change compared to study in 2018; trace TR and un-measurable gradient.

## 2019-12-17 NOTE — PHYSICIAN QUERY
PT Name: Nelly Tian  MR #: [id number]     Physician Query Form - Documentation Clarification      CDS/: SHAUN Proctor, RN, CDS               Contact information:[email or web address]    This form is a permanent document in the medical record.     Query Date: December 17, 2019    By submitting this query, we are merely seeking further clarification of documentation. Please utilize your independent clinical judgment when addressing the question(s) below.    The Medical record reflects the following:    Supporting Clinical Findings Location in Medical Record       Hypertension associated with diabetes  Chronic, controlled.           Dr. Pa, 12/16                                                                            Doctor, please clarify the meaning of Hypertension associated with Diabetes.     Provider Use Only     [   ]  Hypertension is a manifestation of diabetes (secondary hypertension)     [ x  ]  Hypertension is not a manifestation of diabetes     [   ]  Other, _____________________________________                                                                                                               [  ] Clinically Undetermined

## 2019-12-17 NOTE — ASSESSMENT & PLAN NOTE
"Stable at this time.    Outpatient COPD Medications             albuterol (ACCUNEB) 0.63 mg/3 mL Nebu Take 3 mLs (0.63 mg total) by nebulization every 6 (six) hours as needed. Rescue    albuterol (PROVENTIL/VENTOLIN HFA) 90 mcg/actuation inhaler INHALE 2 PUFFS INTO THE LUNGS EVERY 6 (SIX) HOURS AS NEEDED FOR RESCUE    BREO ELLIPTA 100-25 mcg/dose diskus inhaler INHALE 1 PUFF INTO THE LUNGS ONCE DAILY.    budesonide (PULMICORT) 0.5 mg/2 mL nebulizer solution     levalbuterol (XOPENEX) 0.63 mg/3 mL nebulizer solution INHALE THE CONTENTS OF 1 VIAL BY NEBULIZATION 4 times  DAILY.    DX: J43.9      Hospital Medications             albuterol sulfate nebulizer solution 2.5 mg 2.5 mg, Nebulization, Every 4 hours PRN    fluticasone furoate-vilanterol 100-25 mcg/dose diskus inhaler 1 puff 1 puff, Inhalation, Daily    Linked Group 1:  "And" Linked Group Details         "

## 2019-12-17 NOTE — PLAN OF CARE
12/16/19 1911   Patient Assessment/Suction   Level of Consciousness (AVPU) alert   Respiratory Effort Unlabored   Expansion/Accessory Muscles/Retractions no use of accessory muscles;no retractions   All Lung Fields Breath Sounds coarse   Rhythm/Pattern, Respiratory unlabored   Cough Frequency frequent   Cough Type congested   PRE-TX-O2   O2 Device (Oxygen Therapy) nasal cannula   Flow (L/min) 3   Oxygen Concentration (%) 32   SpO2 95 %   Pulse Oximetry Type Intermittent   $ Pulse Oximetry - Multiple Charge Pulse Oximetry - Multiple   Pulse (!) 57   Resp 16   Aerosol Therapy   $ Aerosol Therapy Charges Aerosol Treatment   Respiratory Treatment Status (SVN) given   Treatment Route (SVN) mask   Patient Position (SVN) HOB elevated   Post Treatment Assessment (SVN) breath sounds unchanged   Signs of Intolerance (SVN) none   Breath Sounds Post-Respiratory Treatment   Throughout All Fields Post-Treatment All Fields   Throughout All Fields Post-Treatment no change   Post-treatment Heart Rate (beats/min) 59   Post-treatment Resp Rate (breaths/min) 16   Ready to Wean/Extubation Screen   FIO2<=50 (chart decimal) 0.32

## 2019-12-17 NOTE — PLAN OF CARE
12/17/19 1527   Final Note   Assessment Type Final Discharge Note   Anticipated Discharge Disposition Home-Health

## 2019-12-17 NOTE — ASSESSMENT & PLAN NOTE
Continue home med of eliquis.  She is still in atrial fibrillation but with a ventricular rate in the 40s to 50s.  Cardiology is following.

## 2019-12-17 NOTE — NURSING
Per Dr Ibarhim's order, ambulated 1 lap on unit to monitor for HR response. Highest HR noted was 91;at completion, pt denied any adverse symptoms like SOB, dizziness, etc. Results then called to Dr MOLLY Ibrahim, who gave his approval for discharge to home.

## 2019-12-17 NOTE — PLAN OF CARE
12/17/19 0745   Patient Assessment/Suction   Level of Consciousness (AVPU) alert   Respiratory Effort Unlabored   Expansion/Accessory Muscles/Retractions no use of accessory muscles   All Lung Fields Breath Sounds diminished;coarse   PRE-TX-O2   O2 Device (Oxygen Therapy) nasal cannula   $ Is the patient on Low Flow Oxygen? Yes   Flow (L/min) 3   SpO2 (!) 93 %   Pulse Oximetry Type Intermittent   $ Pulse Oximetry - Multiple Charge Pulse Oximetry - Multiple   Pulse (!) 45   Resp 16   Aerosol Therapy   $ Aerosol Therapy Charges Aerosol Treatment   Respiratory Treatment Status (SVN) given   Treatment Route (SVN) mask   Patient Position (SVN) HOB elevated   Post Treatment Assessment (SVN) breath sounds unchanged   Signs of Intolerance (SVN) none   Breath Sounds Post-Respiratory Treatment   Throughout All Fields Post-Treatment All Fields   Throughout All Fields Post-Treatment no change   Post-treatment Heart Rate (beats/min) 44   Post-treatment Resp Rate (breaths/min) 18

## 2019-12-17 NOTE — PROGRESS NOTES
"[facility] Hospital Medicine  Progress Note    Patient Name: Nelly Tian  MRN: [id number]  Patient Class: IP- Inpatient   Admission Date: 2019  Length of Stay: 2 days  Attending Physician: Diaz Pa MD  Primary Care Provider: Constantine Bird MD        Subjective:     Principal Problem:Cellulitis of abdominal wall        HPI:  H & P as per ER:  "Nelly Tian is a 69 y.o. female with Hx of recurrent skin infections who presents to the ED with c/o left-sided abdominal pain from cellulitis since this morning. Pt c/o chills, tremors, cough for 2 weeks, and congestion. She has a temp of 101.5 °F at time of arrival. The patient denies N/V/D, urinary symptoms, or any other symptoms at this time. Pt reports of smoking . PMHx of DM, COPD, HTN, CHF, and septic shock. PSHx includes , oophorectomy, and hysterectomy."    Patient seen in ER for complaints of shakes and fever. Started this morning. Tried taking tylenol for this but symptoms didn't improve. Was recently seen by her PCP (Dr. Bird) on  for abdomnial cellulitis. Was started on DURICEF but symptoms have not improved. Also complains of a cough with yellow phlegm since getting the flu shot about 3 weeks ago. Feels congested.  While in ER WBC was elevated at 20.1. Lactate was within normal limits. CXR showed no acute findings.     Overview/Hospital Course:  No notes on file    Interval History:  Continues to have clears serous drainage from the affected area on her abdominal wall.  She is afebrile.  She is tolerating antibiotics.  She remains with bradycardia but is asymptomatic.    Review of Systems   Constitutional: Negative for chills and fever.   Respiratory: Negative for cough and shortness of breath.    Cardiovascular: Negative for chest pain.   Gastrointestinal: Negative for abdominal pain, nausea and vomiting.     Objective:     Vital Signs (Most Recent):  Temp: 97.7 °F (36.5 °C) (19)  Pulse: " (!) 55 (12/16/19 2010)  Resp: 18 (12/16/19 2010)  BP: 136/65 (12/16/19 2010)  SpO2: 95 % (12/16/19 2010) Vital Signs (24h Range):  Temp:  [96 °F (35.6 °C)-98.4 °F (36.9 °C)] 97.7 °F (36.5 °C)  Pulse:  [41-64] 55  Resp:  [16-24] 18  SpO2:  [93 %-100 %] 95 %  BP: (103-136)/(50-65) 136/65     Weight: 107 kg (236 lb)  Body mass index is 35.88 kg/m².    Intake/Output Summary (Last 24 hours) at 12/16/2019 2055  Last data filed at 12/16/2019 1800  Gross per 24 hour   Intake 1580 ml   Output --   Net 1580 ml      Physical Exam   Constitutional: She is oriented to person, place, and time. No distress.   HENT:   Mouth/Throat: No oropharyngeal exudate.   Eyes: Right eye exhibits no discharge. Left eye exhibits no discharge.   Neck: No JVD present.   Cardiovascular: Normal rate and regular rhythm.   Pulmonary/Chest: Effort normal and breath sounds normal. She has no wheezes.   Abdominal: Soft. Bowel sounds are normal. She exhibits no distension and no mass. There is no tenderness.   There is a large hernia on the left side of her abdomen.  The skin overlying it has erythema and some warmth.  Reportedly the redness is less than on day one of admission.   Musculoskeletal: She exhibits no edema.   Neurological: She is alert and oriented to person, place, and time.   Skin: Skin is warm and dry. She is not diaphoretic.       Significant Labs: All pertinent labs within the past 24 hours have been reviewed.    Significant Imaging: I have reviewed all pertinent imaging results/findings within the past 24 hours.      Assessment/Plan:      * Cellulitis of abdominal wall  Continue current antibiotics.  Id is following.  It appears that the cellulitis is improving.    Antibiotics (From admission, onward)    Start     Stop Route Frequency Ordered    12/15/19 2100  vancomycin 1.5 g in dextrose 5 % 250 mL IVPB (ready to mix)      -- IV Every 12 hours (non-standard times) 12/15/19 1116          Chronic combined systolic and diastolic CHF  "(congestive heart failure)  Current leave her CHF is stable and controlled.  She is on an ACE-inhibitor.  Her heart rate is too low to allow a beta-blocker.  Echocardiogram performed this admission.  I am waiting for the report.    COPD (chronic obstructive pulmonary disease)  Stable at this time.    Outpatient COPD Medications             albuterol (ACCUNEB) 0.63 mg/3 mL Nebu Take 3 mLs (0.63 mg total) by nebulization every 6 (six) hours as needed. Rescue    albuterol (PROVENTIL/VENTOLIN HFA) 90 mcg/actuation inhaler INHALE 2 PUFFS INTO THE LUNGS EVERY 6 (SIX) HOURS AS NEEDED FOR RESCUE    BREO ELLIPTA 100-25 mcg/dose diskus inhaler INHALE 1 PUFF INTO THE LUNGS ONCE DAILY.    budesonide (PULMICORT) 0.5 mg/2 mL nebulizer solution     levalbuterol (XOPENEX) 0.63 mg/3 mL nebulizer solution INHALE THE CONTENTS OF 1 VIAL BY NEBULIZATION 4 times  DAILY.    DX: J43.9      Hospital Medications             albuterol sulfate nebulizer solution 2.5 mg 2.5 mg, Nebulization, Every 4 hours PRN    fluticasone furoate-vilanterol 100-25 mcg/dose diskus inhaler 1 puff 1 puff, Inhalation, Daily    Linked Group 1:  "And" Linked Group Details           DDD (degenerative disc disease), lumbar  Continue home med of norco.  Stable.      Hypertension associated with diabetes  Chronic, controlled.  Latest blood pressure and vitals reviewed-   Temp:  [96 °F (35.6 °C)-98.4 °F (36.9 °C)]   Pulse:  [41-64]   Resp:  [16-24]   BP: (103-136)/(50-65)   SpO2:  [93 %-100 %] .   Home meds for hypertension were reviewed and noted below. Hospital anti-hypertensive changes were made as shown below.  Outpatient Hypertension Medications             furosemide (LASIX) 40 MG tablet Take 0.5 tablets (20 mg total) by mouth 3 (three) times a week.    lisinopril (PRINIVIL,ZESTRIL) 20 MG tablet TAKE ONE TABLET BY MOUTH EVERY DAY      Hospital Medications             furosemide tablet 20 mg 20 mg, Oral, Three times weekly    lisinopril tablet 20 mg 20 mg, Oral, " Daily        Will utilize p.r.n. blood pressure medication only if patient's blood pressure greater than  180/110 and she develops symptoms such as worsening chest pain or shortness of breath.      Chronic atrial fibrillation  Continue home med of eliquis.  She is still in atrial fibrillation but with a ventricular rate in the 40s to 50s.  Cardiology is following.    GERD (gastroesophageal reflux disease)  Protonix.  Controlled.      Major depression, recurrent, chronic  Continue cymbalta.  Controlled.      Diabetes mellitus with neuropathy  Patient's FSGs are controlled on current hypoglycemics.   Last A1c reviewed-   Lab Results   Component Value Date    HGBA1C 5.3 12/14/2019     Most recent fingerstick glucose reviewed-   Recent Labs   Lab 12/15/19  2119 12/16/19  0540 12/16/19  1137 12/16/19  1657   POCTGLUCOSE 111* 89 95 145*     Current correctional scale  Low  Maintain anti-hyperglycemic dose as follows-   Antihyperglycemics (From admission, onward)    Start     Stop Route Frequency Ordered    12/14/19 1842  insulin aspart U-100 pen 0-5 Units      -- SubQ Before meals & nightly PRN 12/14/19 1842            VTE Risk Mitigation (From admission, onward)         Ordered     apixaban tablet 5 mg  2 times daily      12/14/19 1842     Place DEVEN hose  Until discontinued      12/14/19 1842     Place sequential compression device  Until discontinued      12/14/19 1842     IP VTE HIGH RISK PATIENT  Once      12/14/19 1842                      Diaz Pa MD  Department of Hospital Medicine   [facility]

## 2019-12-17 NOTE — PLAN OF CARE
[facility]: [facility]     Date: 12/17/2019 3:20 PM  Created By: Mike Murguia  Status: Accepted     12/17/19 1527   Post-Acute Status   Post-Acute Authorization Home Health/Hospice   Home Health/Hospice Status Set-up Complete

## 2019-12-17 NOTE — PROGRESS NOTES
[facility]  Cardiology  Progress Note    Patient Name: Nelly Tian  MRN: [id number]  Admission Date: 12/14/2019  Hospital Length of Stay: 2 days  Code Status: DNR   Attending Physician: Diaz Pa MD   Primary Care Physician: Constantine Bird MD  Expected Discharge Date:   Principal Problem:Cellulitis of abdominal wall    Subjective:     Hospital Course: 69 y.o. female with h/o AF, HTN, HLD, DM, COPD, chronic cellulitis on abd, was admitted for cellulitis of abd.   ECG monitor showed AF with slow HR around 40, Bp 97/42, w/o symptom.    Interval History: In AF, HR 40-70s. Pt denies cardiac symptom, reports doing well.    Review of Systems  Objective:     Vital Signs (Most Recent):  Temp: 98 °F (36.7 °C) (12/16/19 1518)  Pulse: (!) 41 (12/16/19 1537)  Resp: 18 (12/16/19 1537)  BP: (!) 133/55 (12/16/19 1518)  SpO2: 98 % (12/16/19 1537) Vital Signs (24h Range):  Temp:  [96 °F (35.6 °C)-98.4 °F (36.9 °C)] 98 °F (36.7 °C)  Pulse:  [41-65] 41  Resp:  [16-24] 18  SpO2:  [93 %-100 %] 98 %  BP: (103-136)/(50-63) 133/55   Weight: 107 kg (236 lb)  Body mass index is 35.88 kg/m².  SpO2: 98 %  O2 Device (Oxygen Therapy): nasal cannula    Intake/Output Summary (Last 24 hours) at 12/16/2019 1825  Last data filed at 12/16/2019 0600  Gross per 24 hour   Intake 730 ml   Output --   Net 730 ml     Lines/Drains/Airways     Peripheral Intravenous Line                 Peripheral IV - Single Lumen 12/14/19 1512 22 G Right Wrist 2 days               Physical Exam  Gen: Comfort, sitting on bed, in no distress; obese  Skin: warm and dry; redness on L lower abd wall  Lymph: no lymphadenopathy detected  HEENT: NC/AT  Neck: supple, no JVD/bruit  Chest: no deformity, equal movement b/l  Lung: CTA b/l  Heart: Irregular, S1/S2 +, no M/G/R  Abd: BS+, S, NT/ND  Ext: no deformity, no pretibial edema  Pulse: b/l radial 2+  Neuro: AAOx3     Cardiographics  ECG 12/15/19: AF with slow HR, 41 bpm, low voltage, poor R  progression..  Echocardiogram 2/1/18:    1 - Low normal to mildly depressed left ventricular systolic function (EF 50-55%).     2 - The estimated PA systolic pressure is greater than 22 mmHg.     3 - Mild aortic regurgitation.     4 - Mild mitral regurgitation.   [facility] 11/24/14:  - Left Main Coronary Artery: The LM is normal. There is BRIGID 3 flow.     - Left Anterior Descending Artery: The LAD has luminal irregularities. There is BRIGID 3 flow.     - Left Circumflex Artery: The LCX has luminal irregularities. There is BRIGID 3 flow.     - Right Coronary Artery: The RCA has luminal irregularities. There is BRIGID 3 flow. Small non-dominant artery     Imaging  Chest x-ray 12/14/19: No acute cardiopulmonary abnormality appreciated radiographically.  No interval detrimental change in the radiographic appearance of the chest when compared to the previous study.     Lab Review   Lab Results   Component Value Date    WBC 6.26 12/16/2019    HGB 11.0 (L) 12/16/2019    HCT 37.9 12/16/2019    MCV 89 12/16/2019     12/16/2019     BMP  Lab Results   Component Value Date     12/16/2019    K 4.9 12/16/2019     12/16/2019    CO2 30 (H) 12/16/2019    BUN 13 12/16/2019    CREATININE 0.7 12/16/2019    CALCIUM 9.3 12/16/2019    ANIONGAP 6 (L) 12/16/2019    ESTGFRAFRICA >60 12/16/2019    EGFRNONAA >60 12/16/2019   Results for FREDY GIBBS (MRN [id number]) as of 12/15/2019 13:20    Ref. Range 12/14/2019 19:28 12/14/2019 19:28 12/15/2019 01:27   Troponin I Latest Ref Range: 0.000 - 0.026 ng/mL <0.006 <0.006 <0.006       Assessment:   A-fib with slow HR, asymptomatic; Trop neg  Cellulitis  HTN  DM  COPD   Plan:   Continue monitor ECG.  Avoid any meds that potentially having AVB function; if symptomatic, likely need pacer.  Continue antibiotic per ID.  Encourage out of bed and watch HR response with physical activity.      Love Ibrahim MD  Cardiology  [facility]

## 2019-12-17 NOTE — PHYSICIAN QUERY
PT Name: Nelly Tian  MR #: [id number]    Physician Query Form - Emergency Medicine Diagnosis Clarification     CDS/: SHAUN Proctor, RN, CDS               Contact information:[email or web address]  This form is a permanent document in the medical record.     Query Date: December 17, 2019    By submitting this query, we are merely seeking further clarification of documentation.  Please utilize your independent clinical judgment when addressing the question(s) below.      The Medical record contains the following:     Diagnosis Supporting Clinical Information Location in Medical Record   Sepsis due to cellulitis Patient appears to be developing sepsis from cellulitis and panniculitis    Sepsis due to cellulitis    /77,  ,  Resp 20,  Temp 101.5, SpO2 96%    Positive for tremors. Positive for chills and fever      Cellulitis of abdominal wall  Continue IV vanc and clindamycin  ID consulted      Presents to the ED with c/o left-sided abdominal pain from cellulitis since this morning. Pt c/o chills, tremors, cough for 2 weeks, and congestion.     Was recently seen by her PCP (Dr. Bird) on 12/5 for abdomnial cellulitis. Was started on DURICEF but symptoms have not improved    Temp 102.4, HR 95, Resp 20    While in ER WBC was elevated at 20.1. Lactate was within normal limits          Afebrile and WBC have returned to normal. Blood cultures negative.  Abdominal wall cellulitis, involving large pannus over hernia                The culture of the drainage from the abdominal wall was not taken in a sterile manner and I do not believe the culture is relevant         12/14 12/15 12/16     WBC 20.10 (H) 17.66 (H) 6.26       Procalcitonin 0.15     Blood culture- no growth to date    Wound culture- gram negative beto, rare ED provider note, Dr. Weinstein, 12/14                  H&P, Dr. Mcarthur, 12/14                                    ID, Dr. Jacobsen, 12/17                Lab 12/14- 12/16          Lab  12/14    Lab 12/14    Lab 12/15     Do you agree with the Emergency Medicine diagnosis of Sepsis due to cellulitis?    [ x  ] Yes   [   ] No   [   ] Other/Clarification of Findings:   [  ] Clinically Undetermined         Please document in your progress notes daily for the duration of treatment until resolved and include in your discharge summary.

## 2019-12-17 NOTE — ASSESSMENT & PLAN NOTE
Chronic, controlled.  Latest blood pressure and vitals reviewed-   Temp:  [96 °F (35.6 °C)-98.4 °F (36.9 °C)]   Pulse:  [41-64]   Resp:  [16-24]   BP: (103-136)/(50-65)   SpO2:  [93 %-100 %] .   Home meds for hypertension were reviewed and noted below. Hospital anti-hypertensive changes were made as shown below.  Outpatient Hypertension Medications             furosemide (LASIX) 40 MG tablet Take 0.5 tablets (20 mg total) by mouth 3 (three) times a week.    lisinopril (PRINIVIL,ZESTRIL) 20 MG tablet TAKE ONE TABLET BY MOUTH EVERY DAY      Hospital Medications             furosemide tablet 20 mg 20 mg, Oral, Three times weekly    lisinopril tablet 20 mg 20 mg, Oral, Daily        Will utilize p.r.n. blood pressure medication only if patient's blood pressure greater than  180/110 and she develops symptoms such as worsening chest pain or shortness of breath.

## 2019-12-17 NOTE — ASSESSMENT & PLAN NOTE
Continue current antibiotics.  Id is following.  It appears that the cellulitis is improving.    Antibiotics (From admission, onward)    Start     Stop Route Frequency Ordered    12/15/19 2100  vancomycin 1.5 g in dextrose 5 % 250 mL IVPB (ready to mix)      -- IV Every 12 hours (non-standard times) 12/15/19 8929

## 2019-12-17 NOTE — ASSESSMENT & PLAN NOTE
Current leave her CHF is stable and controlled.  She is on an ACE-inhibitor.  Her heart rate is too low to allow a beta-blocker.  Echocardiogram performed this admission.  I am waiting for the report.

## 2019-12-17 NOTE — NURSING
Verbal & written d/c orders given w/ verbalized understanding; ABX scipt from Dr Jacobsen given. IV d/c'd; pt to be driven home by family member.

## 2019-12-17 NOTE — PROGRESS NOTES
Consult Note  Infectious Disease    Reason for Consult:  Cellulitis of abd wall    HPI: Nelly Tian is a   69 y.o. female with whom I am familiar from prior consultations. She has been on suppression with daily cefadroxil for recurrent abd wall cellulitis. The large ulcerations that she had 6 months ago are healed. She is preparing for hernia repair? Panniculectomy? In January. She developed bronchitis about 2 weeks ago, along with other family members who had URIs and she has been unable to improve. She saw Dr. Bird 12/2 and was given prednisone x 3 days and a new rx of cefadroxil. She continued to cough, with purulent sputum and yesterday she noted acute onset of left sided abdominal wall erythema and fever to 101. She has had no injury, new skin lesion or recurrence of candidiasis. She does continue to smoke. She did have a flu vaccine.     12/16: interim reviewed. Afebrile and WBC have returned to normal. Blood cultures negative. Clear fluid from abd wall culture is negative so far. She was not given a sputum container so the sputum culture has not been collected yet. Her abdominal wall erythema is improved. She is breathing more easily but have the wet cough still. She does have a nebulizer at home.   12/17: afebrile. No longer weeping from abdominal wall. Culture is noted and I believe this is irrelevant. Breathing comfortably. Ambulatory independently. Reviewed recs to pursue stress testing of some kind prior to surgery. Reviewed recs to never smoke again. 2014 angio noted. Echo with EF 35%, moderate to sever MR and TR, moderate pulmonary hypertension with estimated PA pressure 57.     EXAM & DIAGNOSTICS REVIEWED:   Vitals:     Temp:  [97.7 °F (36.5 °C)-98.5 °F (36.9 °C)]   Temp: 98.1 °F (36.7 °C) (12/17/19 0751)  Pulse: (!) 41 (12/17/19 0753)  Resp: 18 (12/17/19 0753)  BP: (!) 119/56 (12/17/19 0751)  SpO2: 100 % (12/17/19 0753)    Intake/Output Summary (Last 24 hours) at 12/17/2019 1122  Last data  filed at 12/17/2019 0800  Gross per 24 hour   Intake 1460 ml   Output --   Net 1460 ml       General:  In NAD. Alert and attentive, cooperative, comfortable, non toxic  Eyes:  Anicteric, PERRL, EOMI  ENT:  No ulcers, exudates, thrush, nares patent, dentition is fair  Neck:  supple,    Lungs: Clear lungs,  wet cough  Heart:  iRRR, slow, no gallop/murmur/rub noted  Abd:  Soft, NT, ND, normal BS, no masses or organomegaly appreciated.   Abdominal wall over large left sided hernia forms large pannus (smaller than  May) which is erythema along the outer edge with resolution of the red streaks emanating proximally. The intensity of the abdominal wall is improved.  There are no abscesses, bullae, and there is no crepitance to palpation and no ischemic change. The previously seen candidiasis in may is under excellent control at this time. There is one pinpoint of serous, non purulent drainage medial to long axis of pannus. There is no extension of cellulitis to flank or to the right side of her abdomen. Stable exam 12/17  :  Voids   Musc:  Joints without effusion, swelling, erythema, synovitis, muscle wasting.   Skin:  No rashes. But extensive petechiae of right lower leg/foot which she indicates is not new  Wound:   Neuro:   lert, attentive, speech fluent, face symmetric, moves all extremities, no focal weakness. Ambulatory  Psych:  Calm, cooperative     Extrem: No edema, erythema, phlebitis, cellulitis, warm and well perfused  VAD:       Isolation:      Lines/Tubes/Drains:    General Labs reviewed:  Recent Labs   Lab 12/14/19  1548 12/15/19  0127 12/16/19  0551   WBC 20.10* 17.66* 6.26   HGB 12.2 11.2* 11.0*   HCT 41.3 37.8 37.9    197 189       Recent Labs   Lab 12/14/19  1548 12/15/19  0127 12/16/19  0551   * 136 138   K 5.1 4.5 4.9   CL 98 101 102   CO2 26 28 30*   BUN 14 12 13   CREATININE 0.7 0.7 0.7   CALCIUM 9.7 8.7 9.3   PROT 7.5 6.2 6.3   BILITOT 0.7 0.4 0.3   ALKPHOS 105 87 92   ALT 31 25 23   AST  29 18 23           Micro:  Microbiology Results (last 7 days)     Procedure Component Value Units Date/Time    Culture, Anaerobe [[id number]] Collected:  12/15/19 1130    Order Status:  Completed Specimen:  Skin from Abdomen Updated:  12/17/19 1056     Anaerobic Culture Culture in progress    Aerobic culture [[id number]]  (Abnormal) Collected:  12/15/19 1130    Order Status:  Completed Specimen:  Skin from Abdomen Updated:  12/17/19 1036     Aerobic Bacterial Culture GRAM NEGATIVE BRUNO  Rare  Identification and susceptibility pending      Blood culture x two cultures. Draw prior to antibiotics. [[id number]] Collected:  12/14/19 1548    Order Status:  Completed Specimen:  Blood from Peripheral, Left  Wrist Updated:  12/17/19 0612     Blood Culture, Routine No Growth to date      No Growth to date      No Growth to date    Narrative:       Aerobic and anaerobic    Blood culture x two cultures. Draw prior to antibiotics. [[id number]] Collected:  12/14/19 1548    Order Status:  Completed Specimen:  Blood Updated:  12/17/19 0612     Blood Culture, Routine No Growth to date      No Growth to date      No Growth to date    Narrative:       Aerobic and anaerobic    Culture, Respiratory with Gram Stain [[id number]]     Order Status:  No result Specimen:  Respiratory from Sputum, Expectorated     Influenza A & B by Molecular [[id number]] Collected:  12/14/19 1531    Order Status:  Completed Specimen:  Nasopharyngeal Swab Updated:  12/14/19 1559     Influenza A, Molecular Negative     Influenza B, Molecular Negative     Flu A & B Source Nasal swab        Imaging Reviewed:   CXR clear      Cardiology:    IMPRESSION & PLAN   1. Abdominal wall cellulitis, involving large pannus over hernia   The culture of the drainage from the abdominal wall was not taken in a sterile manner and I do not believe the culture is relevant  2. Bronchitis, smoker, improved  3. Bradycardia, chronic atrial fib, depressed EF, MR and TR and pulm hypertension  and echo worse than 2/2018      Recommendations:  vanc for skin and doxy for bronchitis until discharge and then po doxy for a week and resume cefadroxil (I gave her a rx) bid for a week then daily     I encouraged her to pursue nuclear stress test prior to abdominal surgery and pursue pulmonary and cardiology clearance before

## 2019-12-17 NOTE — ASSESSMENT & PLAN NOTE
Patient's FSGs are controlled on current hypoglycemics.   Last A1c reviewed-   Lab Results   Component Value Date    HGBA1C 5.3 12/14/2019     Most recent fingerstick glucose reviewed-   Recent Labs   Lab 12/15/19  2119 12/16/19  0540 12/16/19  1137 12/16/19  1657   POCTGLUCOSE 111* 89 95 145*     Current correctional scale  Low  Maintain anti-hyperglycemic dose as follows-   Antihyperglycemics (From admission, onward)    Start     Stop Route Frequency Ordered    12/14/19 1842  insulin aspart U-100 pen 0-5 Units      -- SubQ Before meals & nightly PRN 12/14/19 1842

## 2019-12-17 NOTE — PLAN OF CARE
JUAN FRANCISCO sent the referral to [facility] via [employer].  [facility] to resume hh services.       12/17/19 1526   Post-Acute Status   Post-Acute Authorization Home Health/Hospice   Home Health/Hospice Status Referrals Sent   Patient choice form signed by patient/caregiver List with quality metrics by geographic area provided

## 2019-12-18 PROBLEM — A41.9 SEPSIS DUE TO CELLULITIS: Status: RESOLVED | Noted: 2019-12-14 | Resolved: 2019-12-15

## 2019-12-18 PROBLEM — L03.90 SEPSIS DUE TO CELLULITIS: Status: RESOLVED | Noted: 2019-12-14 | Resolved: 2019-12-15

## 2019-12-18 LAB — BACTERIA SPEC AEROBE CULT: ABNORMAL

## 2019-12-19 LAB — BACTERIA SPEC ANAEROBE CULT: NORMAL

## 2019-12-19 NOTE — DISCHARGE SUMMARY
"[facility] Medicine  Discharge Summary      Patient Name: Nelly Tian  MRN: [id number]  Admission Date: 2019  Hospital Length of Stay: 3 days  Discharge Date and Time: 2019  4:00 PM  Attending Physician: No att. providers found   Discharging Provider: Diaz Pa MD  Primary Care Provider: Constantine Bird MD      HPI:   H & P as per ER:  "Nelly Tian is a 69 y.o. female with Hx of recurrent skin infections who presents to the ED with c/o left-sided abdominal pain from cellulitis since this morning. Pt c/o chills, tremors, cough for 2 weeks, and congestion. She has a temp of 101.5 °F at time of arrival. The patient denies N/V/D, urinary symptoms, or any other symptoms at this time. Pt reports of smoking . PMHx of DM, COPD, HTN, CHF, and septic shock. PSHx includes , oophorectomy, and hysterectomy."    Patient seen in ER for complaints of shakes and fever. Started this morning. Tried taking tylenol for this but symptoms didn't improve. Was recently seen by her PCP (Dr. Bird) on  for abdomnial cellulitis. Was started on DURICEF but symptoms have not improved. Also complains of a cough with yellow phlegm since getting the flu shot about 3 weeks ago. Feels congested.  While in ER WBC was elevated at 20.1. Lactate was within normal limits. CXR showed no acute findings.     * No surgery found *      Hospital Course:   Patient was admitted and placed on IV antibiotics.  We consulted with Infectious Diseases.  Patient also had atrial fibrillation with a slow ventricular response on admission, and for that, we consulted Cardiology.  Her temperature is improved and her white blood cell count came down.  Dr. aJy agreed with vancomycin for the scan.  She recommended that patient take cefadroxil after discharge, b.i.d. for one week and then daily thereafter.  Cardiology evaluated her and determined that the patient was asymptomatic.  Recommendation was to " avoid medications with AV blocking effects.  There was no clear indication for a pacemaker.  Echocardiogram showed ejection fraction of 35%, and grade 3 diastolic dysfunction.  Also seen were mitral regurgitation and tricuspid regurgitation.  The echo had no major changes compared to one done in 2018.  The cellulitis improved with treatment, and she was eventually discharged home.  Dr. Ibrahim, cardiologist, stated that he would follow up with patient in his clinic.  Dr. Misty smith and I encouraged the patient to seek cardiac stress testing prior to hernia repair and panniculectomy.  Patient said that the plan is to perform these surgeries probably sometime in January of 2020.    Physical Exam   Constitutional: She is oriented to person, place, and time. No distress.   HENT:   Mouth/Throat: No oropharyngeal exudate.   Eyes: Right eye exhibits no discharge. Left eye exhibits no discharge.   Neck: No JVD present.   Cardiovascular: Normal rate and regular rhythm.   Pulmonary/Chest: Effort normal and breath sounds normal. She has no wheezes.   Abdominal: Soft. Bowel sounds are normal. She exhibits no distension and no mass. There is no tenderness.   There is a large hernia on the left side of her abdomen.  The skin overlying it has erythema and some warmth.  Reportedly the redness is less than on day one of admission.       Consults:   Consults (From admission, onward)        Status Ordering Provider     Infectious Disease  Once     Provider:  Opal Jacobsen MD    Completed MARIANNE CARLIN     Inpatient consult to Cardiology  Once     Provider:  Love Ibrahim MD    Completed MARIANNE CRALIN          No new Assessment & Plan notes have been filed under this hospital service since the last note was generated.  Service: Hospital Medicine    Final Active Diagnoses:    Diagnosis Date Noted POA    PRINCIPAL PROBLEM:  Cellulitis of abdominal wall [L03.311] 05/04/2019 Yes    Chronic combined systolic and diastolic CHF  (congestive heart failure) [I50.42] 08/23/2018 Yes    COPD (chronic obstructive pulmonary disease) [J44.9] 04/05/2017 Yes    DDD (degenerative disc disease), lumbar [M51.36] 03/29/2016 Yes     Chronic    Hypertension associated with diabetes [E11.59, I10] 01/19/2016 Yes    Chronic atrial fibrillation [I48.20] 11/10/2015 Yes    GERD (gastroesophageal reflux disease) [K21.9] 11/13/2014 Yes    Major depression, recurrent, chronic [F33.9]  Yes    Diabetes mellitus with neuropathy [E11.40] 08/13/2012 Yes     Chronic      Problems Resolved During this Admission:    Diagnosis Date Noted Date Resolved POA    Sepsis due to cellulitis [L03.90, A41.9] 12/14/2019 12/15/2019 Yes       Discharged Condition: good    Disposition: Home-Health Care c    Follow Up:  Follow-up Information     Love Ibrahim MD. Schedule an appointment as soon as possible for a visit in 2 weeks.    Specialties:  Cardiovascular Disease, Cardiology  Why:  to follow up for your heart, Followup  Contact information:  [address]  [address]  [facility]  [place] [address]  [phone]             Call Opal Jacobsen MD.    Specialty:  Infectious Diseases  Why:  As needed  Contact information:  [address]  [address]  [place] [facility] [address]  [phone]             Constantine Bird MD On 12/26/2019.    Specialty:  Family Medicine  Why:  f/u with Monique Carlisle. apt in avs  Contact information:  [address] [address]  [phone]                 Patient Instructions:      Diet Adult Regular     Order Specific Question Answer Comments   Additional restrictions: Diabetic 1800      SUBSEQUENT HOME HEALTH ORDERS   Order Comments: Subsequent Home Health Orders      Nursing:   Heart Failure:      SN to instruct on the following:    Instruct on the definition of CHF.   Instruct on the signs/sympoms of CHF to be reported.   Instruct on and monitor daily weights.   Instruct on factors that cause exacerbation.   Instruct on action, dose,  schedule, and side effects of medications.   Instruct on diet as prescribed.   Instruct on activity allowed.   Instruct on life-style modifications for life long management of CHF   SN to assess compliance with daily weights, diet, medications, fluid retention,     safety precautions, activities permitted and life-style modifications.   Additional 1-2 SN visits per week as needed for signs and symptoms     of CHF exacerbation.      For Weight Gain > 2-3 lbs in 1 day or 4-6 lbs over 1 week notify PCP:    Diet:   Diabetic diet 1800 calorie.  Fluid restrict 1500 ml per day.    Activities:   activity as tolerated    Labs:  none     Order Specific Question Answer Comments   What Home Health Agency is the patient currently using? [facility]      Activity as tolerated       Significant Diagnostic Studies:   BMP  Lab Results   Component Value Date     12/16/2019    K 4.9 12/16/2019     12/16/2019    CO2 30 (H) 12/16/2019    BUN 13 12/16/2019    CREATININE 0.7 12/16/2019    CALCIUM 9.3 12/16/2019    ANIONGAP 6 (L) 12/16/2019    ESTGFRAFRICA >60 12/16/2019    EGFRNONAA >60 12/16/2019     Lab Results   Component Value Date    WBC 6.26 12/16/2019    HGB 11.0 (L) 12/16/2019    HCT 37.9 12/16/2019    MCV 89 12/16/2019     12/16/2019            Procedure Component Value Units Date/Time    EKG 12-lead [[id number]] Collected:  12/15/19 1129    Order Status:  Sent Lab Status:  In process Updated:  12/16/19 0928    Narrative:       Test Reason : R00.1,    Vent. Rate : 041 BPM     Atrial Rate : 202 BPM     P-R Int : 000 ms          QRS Dur : 104 ms      QT Int : 498 ms       P-R-T Axes : 000 119 097 degrees     QTc Int : 410 ms    Atrial fibrillation with slow ventricular response  Right axis deviation  Low voltage QRS  Cannot rule out Anterior infarct ,age undetermined  Abnormal ECG  When compared with ECG of 03-MAY-2019 21:53,  Vent. rate has decreased BY  49 BPM    Referred By: GINA   SELF            Confirmed By:          Medications:  Reconciled Home Medications:      Medication List      START taking these medications    doxycycline 100 MG tablet  Commonly known as:  VIBRA-TABS  Take 1 tablet (100 mg total) by mouth every 12 (twelve) hours. for 7 days        CHANGE how you take these medications    cefadroxil 500 MG Cap  Commonly known as:  DURICEF  One capsule oral twice a day for one week.  Then once daily thereafter for prevention.  Take twice a day x 7 days whenever the cellulitis flares up.  What changed:    · how much to take  · how to take this  · when to take this  · additional instructions        CONTINUE taking these medications    * albuterol 0.63 mg/3 mL Nebu  Commonly known as:  ACCUNEB  Take 3 mLs (0.63 mg total) by nebulization every 6 (six) hours as needed. Rescue     * albuterol 90 mcg/actuation inhaler  Commonly known as:  PROVENTIL/VENTOLIN HFA  INHALE 2 PUFFS INTO THE LUNGS EVERY 6 (SIX) HOURS AS NEEDED FOR RESCUE     apixaban 5 mg Tab  Commonly known as:  Eliquis  Take 1 tablet (5 mg total) by mouth 2 (two) times daily.     ascorbic acid (vitamin C) 500 MG tablet  Commonly known as:  VITAMIN C  Take 2 tablets (1,000 mg total) by mouth every evening.     atorvastatin 20 MG tablet  Commonly known as:  LIPITOR  Take 1 tablet (20 mg total) by mouth once daily.     blood-glucose meter kit  Commonly known as:  True Metrix Air Glucose Meter  AC meals. Insulin dependent.Dispence strips and lancets 3 months.     Breo Ellipta 100-25 mcg/dose diskus inhaler  Generic drug:  fluticasone furoate-vilanterol  INHALE 1 PUFF INTO THE LUNGS ONCE DAILY.     budesonide 0.5 mg/2 mL nebulizer solution  Commonly known as:  PULMICORT     clonazePAM 0.5 MG tablet  Commonly known as:  KLONOPIN  Take 1 tablet (0.5 mg total) by mouth 2 (two) times daily. TAKE ONE TABLET BY MOUTH TWO TIMES A DAY AS NEEDED. Medically necessary     DULoxetine 30 MG capsule  Commonly known as:  CYMBALTA  TAKE 1 CAPSULE ONCE DAILY      ferrous sulfate 325 mg (65 mg iron) Tab tablet  Commonly known as:  FEOSOL  Take 1 tablet (325 mg total) by mouth 2 (two) times daily.     fluticasone propionate 50 mcg/actuation nasal spray  Commonly known as:  FLONASE  1 spray (50 mcg total) by Each Nostril route once daily.     furosemide 40 MG tablet  Commonly known as:  LASIX  Take 0.5 tablets (20 mg total) by mouth 3 (three) times a week.     gabapentin 800 MG tablet  Commonly known as:  NEURONTIN  Take 1 tablet (800 mg total) by mouth 3 (three) times daily.     HYDROcodone-acetaminophen  mg per tablet  Commonly known as:  NORCO  Take 1 tablet by mouth every 12 (twelve) hours as needed for Pain. Medical necessary     lancets Misc  To check BG 3 times daily, to use with insurance preferred meter.     levalbuterol 0.63 mg/3 mL nebulizer solution  Commonly known as:  XOPENEX  INHALE THE CONTENTS OF 1 VIAL BY NEBULIZATION 4 times  DAILY.    DX: J43.9     lisinopril 20 MG tablet  Commonly known as:  PRINIVIL,ZESTRIL  TAKE ONE TABLET BY MOUTH EVERY DAY     loratadine 10 mg tablet  Commonly known as:  CLARITIN  Take 1 tablet (10 mg total) by mouth once daily.     metFORMIN 500 MG tablet  Commonly known as:  GLUCOPHAGE  TAKE 1 TABLET (500 MG TOTAL) BY MOUTH 2 (TWO) TIMES DAILY WITH MEALS.     multivitamin tablet  Commonly known as:  THERAGRAN  Take 1 tablet by mouth once daily.     nystatin powder  Commonly known as:  Nystop  Apply topically 2 (two) times daily.     nystatin-triamcinolone cream  Commonly known as:  MYCOLOG II  Apply topically 3 (three) times daily.     omega-3 acid ethyl esters 1 gram capsule  Commonly known as:  LOVAZA  2 (two) times daily.     omeprazole 40 MG capsule  Commonly known as:  PRILOSEC  Take 1 capsule (40 mg total) by mouth once daily.     ondansetron 8 MG Tbdl  Commonly known as:  ZOFRAN-ODT  Take 1 tablet (8 mg total) by mouth every 6 (six) hours as needed.     polyethylene glycol 17 gram/dose powder  Commonly known as:   GLYCOLAX     potassium chloride SA 20 MEQ tablet  Commonly known as:  K-DUR,KLOR-CON  TAKE ONE TABLET BY MOUTH EVERY DAY     senna-docusate 8.6-50 mg 8.6-50 mg per tablet  Commonly known as:  PERICOLACE  Take 1 tablet by mouth 2 (two) times daily as needed for Constipation.     traZODone 100 MG tablet  Commonly known as:  DESYREL  TAKE 1 & 1/2 TABLET BY MOUTH EVERY EVENING     True Metrix Glucose Test Strip Strp  Generic drug:  blood sugar diagnostic  TEST BLOOD SUGAR THREE TIMES A DAY         * This list has 2 medication(s) that are the same as other medications prescribed for you. Read the directions carefully, and ask your doctor or other care provider to review them with you.                Indwelling Lines/Drains at time of discharge:   Lines/Drains/Airways     None                 Time spent on the discharge of patient: 29 minutes  Patient was seen and examined on the date of discharge and determined to be suitable for discharge.         Diaz Pa MD  Department of Hospital Medicine  [facility]

## 2019-12-19 NOTE — HOSPITAL COURSE
Patient was admitted and placed on IV antibiotics.  We consulted with Infectious Diseases.  Patient also had atrial fibrillation with a slow ventricular response on admission, and for that, we consulted Cardiology.  Her temperature is improved and her white blood cell count came down.  Dr. Jay agreed with vancomycin for the scan.  She recommended that patient take cefadroxil after discharge, b.i.d. for one week and then daily thereafter.  Cardiology evaluated her and determined that the patient was asymptomatic.  Recommendation was to avoid medications with AV blocking effects.  There was no clear indication for a pacemaker.  Echocardiogram showed ejection fraction of 35%, and grade 3 diastolic dysfunction.  Also seen were mitral regurgitation and tricuspid regurgitation.  The echo had no major changes compared to one done in 2018.  The cellulitis improved with treatment, and she was eventually discharged home.  Dr. Ibrahim, cardiologist, stated that he would follow up with patient in his clinic.  Dr. Jay there and I encouraged the patient to seek cardiac stress testing prior to hernia repair and panniculectomy.  Patient said that the plan is to perform these surgeries probably sometime in January of 2020.    Physical Exam   Constitutional: She is oriented to person, place, and time. No distress.   HENT:   Mouth/Throat: No oropharyngeal exudate.   Eyes: Right eye exhibits no discharge. Left eye exhibits no discharge.   Neck: No JVD present.   Cardiovascular: Normal rate and regular rhythm.   Pulmonary/Chest: Effort normal and breath sounds normal. She has no wheezes.   Abdominal: Soft. Bowel sounds are normal. She exhibits no distension and no mass. There is no tenderness.   There is a large hernia on the left side of her abdomen.  The skin overlying it has erythema and some warmth.  Reportedly the redness is less than on day one of admission.

## 2019-12-20 LAB
BACTERIA BLD CULT: NORMAL
BACTERIA BLD CULT: NORMAL

## 2019-12-26 ENCOUNTER — TELEPHONE (OUTPATIENT)
Dept: ORTHOPEDICS | Facility: CLINIC | Age: 69
End: 2019-12-26

## 2019-12-26 NOTE — TELEPHONE ENCOUNTER
----- Message from Abi Wang sent at 12/26/2019  2:10 PM CST -----  Contact: self   patient want to speak with a nurse regarding scheduling appointment for injection please call back at  [phone] (home)     Case number [id number]

## 2019-12-31 DIAGNOSIS — I15.2 HYPERTENSION ASSOCIATED WITH DIABETES: ICD-10-CM

## 2019-12-31 DIAGNOSIS — E11.59 HYPERTENSION ASSOCIATED WITH DIABETES: ICD-10-CM

## 2019-12-31 DIAGNOSIS — E11.610 DIABETIC NEUROGENIC ARTHROPATHY: ICD-10-CM

## 2019-12-31 DIAGNOSIS — K21.9 GASTROESOPHAGEAL REFLUX DISEASE, ESOPHAGITIS PRESENCE NOT SPECIFIED: ICD-10-CM

## 2020-01-02 ENCOUNTER — TELEPHONE (OUTPATIENT)
Dept: ORTHOPEDICS | Facility: CLINIC | Age: 70
End: 2020-01-02

## 2020-01-02 RX ORDER — ATORVASTATIN CALCIUM 20 MG/1
TABLET, FILM COATED ORAL
Qty: 90 TABLET | Refills: 3 | Status: SHIPPED | OUTPATIENT
Start: 2020-01-02 | End: 2020-02-18 | Stop reason: SDUPTHER

## 2020-01-02 RX ORDER — OMEPRAZOLE 20 MG/1
CAPSULE, DELAYED RELEASE ORAL
Qty: 30 CAPSULE | Refills: 11 | Status: SHIPPED | OUTPATIENT
Start: 2020-01-02 | End: 2020-09-10 | Stop reason: SDUPTHER

## 2020-01-02 NOTE — TELEPHONE ENCOUNTER
----- Message from Silverio Dior sent at 1/2/2020 11:03 AM CST -----  Contact: Patient  Type: Needs Medical Advice    Who Called:  Patient  Best Call Back Number: [phone]  Additional Information: Patient would like to discuss rescheduling upcoming appointment. Please call to advise. Thanks!

## 2020-01-02 NOTE — PROGRESS NOTES
"Summary:  11/04/2019 (1st Attempt) RNEmilieCM attempted to contact patient to follow up for OPCM. Called [phone]; no answer; left message requesting a return call. Will attempt to contact patient later this week if no return call prior. Emilie Cha, RN-OPCM    11/12/2019 RNSAIRA contacted patient to follow up for OPCM. Spoke to Ms. Monahan via telephone. Reports that things are going pretty well. States that she had an appointment this morning with the PA in [place] for a cough, runny nose and congestion. Patient states that she is continuing to smoke, but not as much as she has in the past. Declines offer to reconnect with Smoking Cessation. Reinforced with patient the importance of smoking cessation for health benefits. Patient states that she is aware, just not able to completely quit at this time. Reminded patient about the link between smoking and increased respiratory issues such as cough, cold, etc. Patient states that she is aware. We reviewed way of conserving energy, preventing complications and improving health while living with COPD. Reminded patient that bronchitis falls under the umbrella of COPD. Patient correctly stated 2 S/S of COPD exacerbation: SOB w/exertion, cough w/mucous and 3 red flags of COPD and when to contact physician: more mucous, change in mucous color, worsening cough. Patient states that she did not feel that the "cold" she has is an exacerbation, but understands that without treatment, it could easily become an exacerbation and that's why she made the appointment today. Told patient that it was good that she recognized this and called the MD. Reports continued compliance with low sodium, low carb diet. Reports that she is now down to 221 pounds, closer still to her goal of 200 pounds. States that she feels like it's possible and is encouraged by her continued loss. Applauded patient for her efforts and stressed the importance of continued weight loss for health reasons and decreased " chances of complications with surgery. Patient expressed appreciation for outreach and requested next follow up call in approximately 4 weeks. Will change patient to monitoring status. If no additional needs expressed at next call, will plan to close case. - LIZZETH Ma RN-OPCM     Interventions:  - Empowered patient to discuss treatment plan with Physician/care team.   - Reviewed with and mailed patient a copy of upcoming scheduled appointments.   - Reinforced importance of compliance with Physician follow-ups.   - Reinforced importance and health benefits of Smoking Cessation.  - Reinforced importance of medication compliance.   - Reviewed with patient ways to prevent complications and improve health while living with COPD.   - Reviewed ways of conserving energy with COPD.    Plan:   - Assess for receipt of mailed educational resources. - 9/30/19 Confirmed receipt  - Prevention of Surgical Site Infections - Patient requested information in preparation for upcoming surgery. Assess for any questions. - 9/30/19 Reviewed with patient; denied any questions.   - CHF - Review education information with patient. Continue to educate as needed. - 9/30/19, 10/18/19  - COPD - Review education information with patient. Continue to educate as needed. - 10/18/19  - Hypertension/Hypotension - Assess for any questions (patient reports controlled). - 9/30/19 Reviewed with patient; denies any questions.   - Assess for any additional needs.      Todays OPCM Self-Management Care Plan was reviewed with the patients input and was based on identified barriers from todays assessment. Goals were reviewed today with the patient and the patient has agreed to work towards these goals to improve her overall well-being. Patient verbalized understanding of the care plan, goals, and all of today's instructions. Encouraged patient to communicate with her physician and health care team about health conditions and the treatment plan. Provided my  contact information today and encouraged patient to call me with any questions as needed. Emilie Cha, RN-OPCM

## 2020-01-02 NOTE — PROGRESS NOTES
"Summary:  09/23/2019 (1st Attempt)  RNSAIRA attempted to complete OPCM follow-up with patient/caregiver. Called [phone]; no answer; left message requesting a return call. Will attempt to contact patient/caregiver next week if no return call prior to then. Emilie Ma, RN-OPCM    09/30/2019 RNSAIRA contacted patient to follow up for OPCM. Patient states that she received the mailed  resources that I sent to her. States that she reviewed the Preventing Surgical Site Infections resources and expressed appreciation for sending the information. We went over the S/S of infection and the ways to prevent infection; patient denied any questions related to this information and stated that she will keep the resource for review prior to her surgery which is yet to be scheduled. States that [facility] nurse is continuing to see her twice per week for wound care (under abdominal flap). Patient had follow up visit with  last week and is now down to 237 pounds, which she is happy about. Patient reports that she thinks the hernia and panniculectomy will likely be scheduled in January. Denies need for any information on procedures as healthcare team has provided adequate information.  Patient states that the panniculitis has been going on for some time and she just wants it to be healed. States that she knows what to do and has been reinforced by the  nurse, but she just can't seem to completely get rid of it. States that "it's getting old". Empathized with patient and encouraged her to discuss concerns with PCP or Dr. Lucero. States that she has done that in the past and that's why they are moving forward with the panniculectomy. States that she has resumed smoking a little and is trying to stop completely. States that it has been difficult. Sent patient information on Smoking Cessation which she states she is very familiar with. Patient states that she is now logging her daily weight, along with the blood glucose 3 times per " day and blood pressure 2 times per day. States blood pressure is consistently in the 110's to 120's (with an occasional 130's) over 60's to 70's. Patient is not concerned about the blood pressure at this point and her MD stated that it is controlled with current medication regimen and weight loss. Patient reports that she will continue to check and log her blood pressure at least once per day. Patient was able to correctly state 3 S/S of high blood pressure (headache, SOB, chest pain) and 2 S/S of low blood pressure (dizzy, light-headed). Denies any further education needs for Hypertension/Hypotension. We reviewed importance of notifying healthcare provider for overnight weight gain of >2 pounds or 5 or more pound weight gain in one week. Patient was able to correctly state reason for notification of weight gain. Patient correctly stated 4 S/S of CHF exacerbation: swelling feet, ankles, lower legs; SOB at rest; feeling weak or tired; persistent cough. We went over ways of preventing complications due to heart failure with patient. Will assess for retention at next call. Patient requested to end call as she wanted to go lie down. Patient in agreement with a follow up call in 2 weeks. Will continue to follow. - , RN-OPCM     Interventions:  - Reviewed the S/S of surgical site infection and the ways to prevent infection with patient.   - Correctly stated 3 S/S of high blood pressure.  - Correctly stated 2 S/S of low blood pressure.   - Correctly stated 4 S/S of CHF exacerbation.   - Reviewed ways of prevention complications due to heart failure.   - Educated patient on the importance of MD notification for any overnight weight gain of >2 pounds or 5 or more pounds in one week.    - Empowered patient to discuss treatment plan with Physician/care team.    - Educated patient on importance of compliance with Physician follow-ups.  - Educated patient on importance of Dietary Compliance (low sodium).   - Educated  patient on importance of Medication Compliance.  - Educated patient on importance of exercise as tolerated.    Plan:   - Assess for receipt of mailed educational resources. - 9/30/19 Confirmed receipt  - Prevention of Surgical Site Infections - Patient requested information in preparation for upcoming surgery. Assess for any questions. - 9/30/19 Reviewed with patient; denied any questions.   - CHF - Review education information with patient. Continue to educate as needed. - 9/30/19  - COPD - Review education information with patient. Continue to educate as needed.  - Hypertension/Hypotension - Assess for any questions (patient reports controlled). - 9/30/19 Reviewed with patient; denies any questions.   - Assess for any additional needs.     Todays OPC Self-Management Care Plan was reviewed with the patients input and was based on identified barriers from todays assessment. Goals were reviewed today with the patient and the patient has agreed to work towards these goals to improve her overall well-being. Patient verbalized understanding of the care plan, goals, and all of today's instructions. Encouraged patient to communicate with her physician and health care team about health conditions and the treatment plan. Provided my contact information today and encouraged patient to call me with any questions as needed. - , RN-OPCM

## 2020-01-02 NOTE — TELEPHONE ENCOUNTER
Spoke to patient, appointment rescheduled for 1/8/19 at 1pm. Per request, pt verbalized understanding.

## 2020-01-02 NOTE — PROGRESS NOTES
Summary:  10/15/2019 (1st Attempt) RNSAIRA attempted to contact patient to follow up for OPCM. Called [phone]; no answer; left message requesting a return call. Will attempt to contact patient later this week if no return call prior. Emilie Cha, RN-OPCM    10/18/19 RNSAIRA retrieved message from patient returning my call from earlier this week. Returned call and completed follow up with patient. Ms. Monahan states that she is doing pretty well. Denies any S/S of CHF exacerbation. States she continued to weigh and log daily. Applauded patient for her continued efforts to manage her health. Denied any overnight weight gain of >2 pounds or 5 or more pounds in one week. Patient correctly stated 4 ways of preventing complications from heart failure: weigh daily and report sudden weight gain to healthcare provider right away, low sodium diet, stop smoking, lose weight. Patient states that she is doing all those but is still struggling with completely stopping smoking. Empathized with patient and encouraged her to take one day at a time and to utilize assistance from Smoking Cessation Program. Patient states that she continues to use her oxygen at night only and does not need it during the day. We reviewed oxygen safety and patient reports that she does not smoke at all around her oxygen. States that she understands the dangers and follows the safety recommendations. Correctly stated: no flames or heat sources within 5 feet, no smoking, turn oxygen off when not in use. We went over the S/S of COPD exacerbation, red flags of COPD exacerbation and when to call healthcare provider right away. Patient stated that she is continuing to lose weight and is now down to 231 pounds, another 6 pound loss since out last call. She stated that she is determined to get closer to 200 pounds so she can move forward with her hernia surgery. Again applauded patient for her continued weight loss success and encouraged her to continue her  efforts. Patient expressed appreciation for outreach and is in agreement with a follow up call in approximately 2 weeks. Will continue to follow. - LIZZETH Ma RN-OPCM    Interventions:  - Reinforced need to weigh and log daily; reports compliance.  - Patient correctly stated 4 ways of preventing complications from heart failure.  - Reinforced ability for  patient to utilize assistance from Smoking Cessation Program.  - Reviewed oxygen safety do's and don'ts with patient.   - Correctly stated 4 oxygen safety recommendations.    - Reviewed with patient the S/S of COPD exacerbation, red flags of COPD exacerbation and when to call healthcare provider right away.  - Reviewed with and mailed patient a copy of upcoming scheduled appointments.   - Applauded patient for her continued efforts in losing weight and better manage her health.     Plan:   - Assess for receipt of mailed educational resources. - 9/30/19 Confirmed receipt  - Prevention of Surgical Site Infections - Patient requested information in preparation for upcoming surgery. Assess for any questions. - 9/30/19 Reviewed with patient; denied any questions.   - CHF - Review education information with patient. Continue to educate as needed. - 9/30/19, 10/18/19  - COPD - Review education information with patient. Continue to educate as needed. - 10/18/19  - Hypertension/Hypotension - Assess for any questions (patient reports controlled). - 9/30/19 Reviewed with patient; denies any questions.   - Assess for any additional needs.      Todays OPCM Self-Management Care Plan was reviewed with the patients input and was based on identified barriers from todays assessment. Goals were reviewed today with the patient and the patient has agreed to work towards these goals to improve her overall well-being. Patient verbalized understanding of the care plan, goals, and all of today's instructions. Encouraged patient to communicate with her physician and health care team  about health conditions and the treatment plan. Provided my contact information today and encouraged patient to call me with any questions as needed. Emilie Cha, RN-OPCM

## 2020-01-03 RX ORDER — GABAPENTIN 800 MG/1
TABLET ORAL
Qty: 270 TABLET | Refills: 3 | Status: SHIPPED | OUTPATIENT
Start: 2020-01-03 | End: 2020-07-29 | Stop reason: SDUPTHER

## 2020-01-05 ENCOUNTER — PATIENT MESSAGE (OUTPATIENT)
Dept: FAMILY MEDICINE | Facility: CLINIC | Age: 70
End: 2020-01-05

## 2020-01-05 DIAGNOSIS — I10 HYPERTENSION, UNSPECIFIED TYPE: ICD-10-CM

## 2020-01-06 DIAGNOSIS — M25.562 PAIN IN BOTH KNEES, UNSPECIFIED CHRONICITY: Primary | ICD-10-CM

## 2020-01-06 DIAGNOSIS — M25.561 PAIN IN BOTH KNEES, UNSPECIFIED CHRONICITY: Primary | ICD-10-CM

## 2020-01-07 ENCOUNTER — HOSPITAL ENCOUNTER (OUTPATIENT)
Dept: RADIOLOGY | Facility: HOSPITAL | Age: 70
Discharge: HOME | End: 2020-01-07
Attending: FAMILY MEDICINE
Payer: MEDICARE

## 2020-01-07 DIAGNOSIS — K45.8 FLANK HERNIA: ICD-10-CM

## 2020-01-07 DIAGNOSIS — M43.12 SPONDYLOLISTHESIS OF CERVICAL REGION: ICD-10-CM

## 2020-01-07 DIAGNOSIS — E11.43 GASTROPARESIS DIABETICORUM: ICD-10-CM

## 2020-01-07 DIAGNOSIS — M51.36 LUMBAR DEGENERATIVE DISC DISEASE: ICD-10-CM

## 2020-01-07 DIAGNOSIS — K31.84 GASTROPARESIS DIABETICORUM: ICD-10-CM

## 2020-01-07 PROCEDURE — 74177 CT ABD & PELVIS W/CONTRAST: CPT | Mod: TC

## 2020-01-07 PROCEDURE — 25500020 PHARM REV CODE 255: Performed by: FAMILY MEDICINE

## 2020-01-07 RX ORDER — HYDROCODONE BITARTRATE AND ACETAMINOPHEN 10; 325 MG/1; MG/1
1 TABLET ORAL EVERY 12 HOURS PRN
Qty: 60 TABLET | Refills: 0 | Status: SHIPPED | OUTPATIENT
Start: 2020-01-07 | End: 2020-02-05 | Stop reason: SDUPTHER

## 2020-01-07 RX ADMIN — IOHEXOL 100 ML: 350 INJECTION, SOLUTION INTRAVENOUS at 11:01

## 2020-01-07 NOTE — TELEPHONE ENCOUNTER
LR--12-2-19  LOV--12-2-19  FOV--3-  [facility] Drug Screen--12-6-18  Pain Contract--12-6-18  Checked [place] Prescription Monitoring Program site. No unusual or abnormal behavior noted.

## 2020-01-08 ENCOUNTER — HOSPITAL ENCOUNTER (OUTPATIENT)
Dept: RADIOLOGY | Facility: HOSPITAL | Age: 70
Discharge: HOME | End: 2020-01-08
Attending: ORTHOPAEDIC SURGERY
Payer: MEDICARE

## 2020-01-08 ENCOUNTER — OFFICE VISIT (OUTPATIENT)
Dept: ORTHOPEDICS | Facility: CLINIC | Age: 70
End: 2020-01-08
Payer: MEDICARE

## 2020-01-08 VITALS
HEART RATE: 60 BPM | SYSTOLIC BLOOD PRESSURE: 135 MMHG | DIASTOLIC BLOOD PRESSURE: 63 MMHG | BODY MASS INDEX: 33.04 KG/M2 | WEIGHT: 218 LBS | HEIGHT: 68 IN

## 2020-01-08 DIAGNOSIS — M17.0 PRIMARY OSTEOARTHRITIS OF BOTH KNEES: Primary | ICD-10-CM

## 2020-01-08 DIAGNOSIS — M25.561 PAIN IN BOTH KNEES, UNSPECIFIED CHRONICITY: ICD-10-CM

## 2020-01-08 DIAGNOSIS — M25.562 PAIN IN BOTH KNEES, UNSPECIFIED CHRONICITY: ICD-10-CM

## 2020-01-08 PROCEDURE — 73564 X-RAY EXAM KNEE 4 OR MORE: CPT | Mod: TC,50,HCNC,PN

## 2020-01-08 PROCEDURE — 99213 OFFICE O/P EST LOW 20 MIN: CPT | Mod: HCNC,25,S$GLB, | Performed by: ORTHOPAEDIC SURGERY

## 2020-01-08 PROCEDURE — 99999 PR PBB SHADOW E&M-EST. PATIENT-LVL III: ICD-10-PCS | Mod: PBBFAC,HCNC,, | Performed by: ORTHOPAEDIC SURGERY

## 2020-01-08 PROCEDURE — 99999 PR PBB SHADOW E&M-EST. PATIENT-LVL III: CPT | Mod: PBBFAC,HCNC,, | Performed by: ORTHOPAEDIC SURGERY

## 2020-01-08 PROCEDURE — 1101F PR PT FALLS ASSESS DOC 0-1 FALLS W/OUT INJ PAST YR: ICD-10-PCS | Mod: HCNC,CPTII,S$GLB, | Performed by: ORTHOPAEDIC SURGERY

## 2020-01-08 PROCEDURE — 73564 X-RAY EXAM KNEE 4 OR MORE: CPT | Mod: 26,50,HCNC, | Performed by: RADIOLOGY

## 2020-01-08 PROCEDURE — 3075F SYST BP GE 130 - 139MM HG: CPT | Mod: HCNC,CPTII,S$GLB, | Performed by: ORTHOPAEDIC SURGERY

## 2020-01-08 PROCEDURE — 1159F PR MEDICATION LIST DOCUMENTED IN MEDICAL RECORD: ICD-10-PCS | Mod: HCNC,S$GLB,, | Performed by: ORTHOPAEDIC SURGERY

## 2020-01-08 PROCEDURE — 3078F DIAST BP <80 MM HG: CPT | Mod: HCNC,CPTII,S$GLB, | Performed by: ORTHOPAEDIC SURGERY

## 2020-01-08 PROCEDURE — 3075F PR MOST RECENT SYSTOLIC BLOOD PRESS GE 130-139MM HG: ICD-10-PCS | Mod: HCNC,CPTII,S$GLB, | Performed by: ORTHOPAEDIC SURGERY

## 2020-01-08 PROCEDURE — 1125F AMNT PAIN NOTED PAIN PRSNT: CPT | Mod: HCNC,S$GLB,, | Performed by: ORTHOPAEDIC SURGERY

## 2020-01-08 PROCEDURE — 20610 LARGE JOINT ASPIRATION/INJECTION: R KNEE, L KNEE: ICD-10-PCS | Mod: 50,HCNC,S$GLB, | Performed by: ORTHOPAEDIC SURGERY

## 2020-01-08 PROCEDURE — 1125F PR PAIN SEVERITY QUANTIFIED, PAIN PRESENT: ICD-10-PCS | Mod: HCNC,S$GLB,, | Performed by: ORTHOPAEDIC SURGERY

## 2020-01-08 PROCEDURE — 1101F PT FALLS ASSESS-DOCD LE1/YR: CPT | Mod: HCNC,CPTII,S$GLB, | Performed by: ORTHOPAEDIC SURGERY

## 2020-01-08 PROCEDURE — 1159F MED LIST DOCD IN RCRD: CPT | Mod: HCNC,S$GLB,, | Performed by: ORTHOPAEDIC SURGERY

## 2020-01-08 PROCEDURE — 73564 XR KNEE ORTHO BILAT WITH FLEXION: ICD-10-PCS | Mod: 26,50,HCNC, | Performed by: RADIOLOGY

## 2020-01-08 PROCEDURE — 99213 PR OFFICE/OUTPT VISIT, EST, LEVL III, 20-29 MIN: ICD-10-PCS | Mod: HCNC,25,S$GLB, | Performed by: ORTHOPAEDIC SURGERY

## 2020-01-08 PROCEDURE — 20610 DRAIN/INJ JOINT/BURSA W/O US: CPT | Mod: 50,HCNC,S$GLB, | Performed by: ORTHOPAEDIC SURGERY

## 2020-01-08 PROCEDURE — 3078F PR MOST RECENT DIASTOLIC BLOOD PRESSURE < 80 MM HG: ICD-10-PCS | Mod: HCNC,CPTII,S$GLB, | Performed by: ORTHOPAEDIC SURGERY

## 2020-01-08 RX ORDER — SPIRONOLACTONE 25 MG/1
TABLET ORAL
COMMUNITY
Start: 2019-12-31 | End: 2020-03-10 | Stop reason: ALTCHOICE

## 2020-01-08 RX ADMIN — TRIAMCINOLONE ACETONIDE 40 MG: 40 INJECTION, SUSPENSION INTRA-ARTICULAR; INTRAMUSCULAR at 01:01

## 2020-01-13 RX ORDER — TRIAMCINOLONE ACETONIDE 40 MG/ML
40 INJECTION, SUSPENSION INTRA-ARTICULAR; INTRAMUSCULAR
Status: DISCONTINUED | OUTPATIENT
Start: 2020-01-08 | End: 2020-01-14 | Stop reason: HOSPADM

## 2020-01-14 NOTE — PROGRESS NOTES
Past Medical History:   Diagnosis Date    *Atrial flutter     Angina pectoris 2017    Anxiety     Arthritis     Asthma     Atrial fibrillation     Back pain     Cataract     OD    CHF (congestive heart failure)     COPD (chronic obstructive pulmonary disease)     Depression     Diabetes mellitus     Emphysema of lung     Heart failure     Hepatomegaly 2/3/2016    Hernia     History of MI (myocardial infarction) 2016    Hypercapnic respiratory failure, chronic 2016    Hyperlipidemia     Hypertension     Iron deficiency anemia 2/3/2016    Myocardial infarction     Obesity     Peripheral vascular disease     Pneumonia     Polyneuropathy     Retinal detachment     OS    Septic shock 2017    Skin ulcer 3/18/2017    Tobacco dependence     Type II 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, not stated as uncontrolled(250.60)        Past Surgical History:   Procedure Laterality Date    BREAST BIOPSY Left 10 yrs ago    benign    CATARACT EXTRACTION Left     OS      SECTION      x2    EYE SURGERY      HYSTERECTOMY      partial    OOPHORECTOMY      one ovary conserved    RETINAL DETACHMENT SURGERY      buckle --OS    TONSILLECTOMY         Current Outpatient Medications   Medication Sig    albuterol (ACCUNEB) 0.63 mg/3 mL Nebu Take 3 mLs (0.63 mg total) by nebulization every 6 (six) hours as needed. Rescue    albuterol (PROVENTIL/VENTOLIN HFA) 90 mcg/actuation inhaler INHALE 2 PUFFS INTO THE LUNGS EVERY 6 (SIX) HOURS AS NEEDED FOR RESCUE    apixaban (ELIQUIS) 5 mg Tab Take 1 tablet (5 mg total) by mouth 2 (two) times daily.    ascorbic acid, vitamin C, (VITAMIN C) 500 MG tablet Take 2 tablets (1,000 mg total) by mouth every evening.    atorvastatin (LIPITOR) 20 MG tablet TAKE ONE TABLET BY MOUTH EVERY DAY    blood-glucose meter (TRUE METRIX AIR GLUCOSE METER) kit AC meals. Insulin dependent.Dispence strips and lancets 3 months.     BREO ELLIPTA 100-25 mcg/dose diskus inhaler INHALE 1 PUFF INTO THE LUNGS ONCE DAILY.    budesonide (PULMICORT) 0.5 mg/2 mL nebulizer solution     cefadroxil (DURICEF) 500 MG Cap One capsule oral twice a day for one week.  Then once daily thereafter for prevention.  Take twice a day x 7 days whenever the cellulitis flares up.    clonazePAM (KLONOPIN) 0.5 MG tablet Take 1 tablet (0.5 mg total) by mouth 2 (two) times daily. TAKE ONE TABLET BY MOUTH TWO TIMES A DAY AS NEEDED. Medically necessary    DULoxetine (CYMBALTA) 30 MG capsule TAKE 1 CAPSULE ONCE DAILY    ferrous sulfate (FEOSOL) 325 mg (65 mg iron) Tab tablet Take 1 tablet (325 mg total) by mouth 2 (two) times daily.    fluticasone propionate (FLONASE) 50 mcg/actuation nasal spray 1 spray (50 mcg total) by Each Nostril route once daily.    furosemide (LASIX) 40 MG tablet Take 0.5 tablets (20 mg total) by mouth 3 (three) times a week.    gabapentin (NEURONTIN) 800 MG tablet TAKE ONE TABLET BY MOUTH THREE TIMES A DAY    HYDROcodone-acetaminophen (NORCO)  mg per tablet Take 1 tablet by mouth every 12 (twelve) hours as needed for Pain. Medical necessary    lancets Misc To check BG 3 times daily, to use with insurance preferred meter.    levalbuterol (XOPENEX) 0.63 mg/3 mL nebulizer solution INHALE THE CONTENTS OF 1 VIAL BY NEBULIZATION 4 times  DAILY.    DX: J43.9    lisinopril (PRINIVIL,ZESTRIL) 20 MG tablet TAKE ONE TABLET BY MOUTH EVERY DAY    metFORMIN (GLUCOPHAGE) 500 MG tablet TAKE 1 TABLET (500 MG TOTAL) BY MOUTH 2 (TWO) TIMES DAILY WITH MEALS.    multivitamin (THERAGRAN) tablet Take 1 tablet by mouth once daily.    nystatin (NYSTOP) powder Apply topically 2 (two) times daily.    nystatin-triamcinolone (MYCOLOG II) cream Apply topically 3 (three) times daily.    omega-3 acid ethyl esters (LOVAZA) 1 gram capsule 2 (two) times daily.     omeprazole (PRILOSEC) 20 MG capsule TAKE ONE CAPSULE BY MOUTH EVERY DAY    omeprazole (PRILOSEC)  40 MG capsule Take 1 capsule (40 mg total) by mouth once daily.    ondansetron (ZOFRAN-ODT) 8 MG TbDL Take 1 tablet (8 mg total) by mouth every 6 (six) hours as needed.    polyethylene glycol (GLYCOLAX) 17 gram/dose powder     potassium chloride SA (K-DUR,KLOR-CON) 20 MEQ tablet TAKE ONE TABLET BY MOUTH EVERY DAY    senna-docusate 8.6-50 mg (PERICOLACE) 8.6-50 mg per tablet Take 1 tablet by mouth 2 (two) times daily as needed for Constipation.    spironolactone (ALDACTONE) 25 MG tablet     traZODone (DESYREL) 100 MG tablet TAKE 1 & 1/2 TABLET BY MOUTH EVERY EVENING    TRUE METRIX GLUCOSE TEST STRIP Strp TEST BLOOD SUGAR THREE TIMES A DAY     loratadine (CLARITIN) 10 mg tablet Take 1 tablet (10 mg total) by mouth once daily.     No current facility-administered medications for this visit.        Allergies   Allergen Reactions    Dilaudid [Hydromorphone] Anaphylaxis     Other reaction(s): Anaphylaxis  Other reaction(s): Unknown       Family History   Problem Relation Age of Onset    Arthritis Father     Heart disease Father     Early death Sister 30        heart disease    Heart disease Sister 32        MI    Cancer Brother         Lung cancer    No Known Problems Daughter     Blindness Son         due to accident//    Arthritis Sister     Heart disease Sister     Crohn's disease Sister     Alcohol abuse Mother     Breast cancer Neg Hx     Glaucoma Neg Hx     Macular degeneration Neg Hx     Retinal detachment Neg Hx     Amblyopia Neg Hx     Diabetes Neg Hx     Cataracts Neg Hx     Hypertension Neg Hx     Strabismus Neg Hx     Stroke Neg Hx     Thyroid disease Neg Hx        Social History     Socioeconomic History    Marital status:      Spouse name: Not on file    Number of children: Not on file    Years of education: Not on file    Highest education level: Not on file   Occupational History    Not on file   Social Needs    Financial resource strain: Not on file    Food  insecurity:     Worry: Not on file     Inability: Not on file    Transportation needs:     Medical: Not on file     Non-medical: Not on file   Tobacco Use    Smoking status: Former Smoker     Packs/day: 0.25     Years: 50.00     Pack years: 12.50     Types: Cigarettes     Start date: 1968     Last attempt to quit: 2019     Years since quittin.4    Smokeless tobacco: Never Used   Substance and Sexual Activity    Alcohol use: No     Alcohol/week: 0.0 standard drinks     Frequency: Never    Drug use: No    Sexual activity: Not Currently   Lifestyle    Physical activity:     Days per week: Not on file     Minutes per session: Not on file    Stress: Not on file   Relationships    Social connections:     Talks on phone: Not on file     Gets together: Not on file     Attends Jewish service: Not on file     Active member of club or organization: Not on file     Attends meetings of clubs or organizations: Not on file     Relationship status: Not on file   Other Topics Concern    Not on file   Social History Narrative    Not on file       Chief Complaint:   Chief Complaint   Patient presents with    Left Knee - Pain    Right Knee - Pain       Consulting Physician: No ref. provider found    History of present illness: This is a 64-year-old young lady who reports the return of bilateral knee pain.  Pain today 6/10. No new injury. Previous injections helped.    Review of Systems:    Constitution: Denies chills, fever, and sweats.    HENT: Denies headaches. Reports blurry vision.    Cardiovascular: Denies chest pain or irregular heart beat.    Respiratory: Denies cough. Reports shortness of breath.    Gastrointestinal: Denies abdominal pain, nausea, or vomiting.    Musculoskeletal:  Denies muscle cramps.    Neurological: Denies dizziness or focal weakness.    Psychiatric/Behavioral: Normal mental status. Anxiety.    Hematologic/Lymphatic: Denies bleeding problem or easy bruising/bleeding.    Skin:  Denies rash or suspicious lesions.      Examination:    Vital Signs:    Vitals:    01/08/20 1309   BP: 135/63   Pulse: 60       Body mass index is 33.15 kg/m².    This a well-developed, well nourished patient in no acute distress.    Alert and oriented and cooperative to examination.       Physical Exam: Left Knee Exam    Gait   antalgic    Skin  Rash:   None  Scars:   None    Inspection  Erythema:  None  Ecchymosis:  None  Effusion:  Mild  Masses:  None  Lymphadenopathy: None    Range of Motion: Full - 0 to 130°    Medial Joint : Mild  Lateral Joint : Mild    Patellofemoral Tenderness: None  Patellofemoral Crepitus: None    Lachman:  Normal  Anterior Drawer: Normal  Posterior Drawer: Normal    Dilia's:  Negative  Apley's:  Negative    Varus Stress:  Stable  Valgus Stress: Stable    Strength:  5/5    Pulses:  Palpable distal    Sensation:  Intact      Right Knee Exam    Gait:   Antalgic    Skin  Rash:   None  Scars:   None    Inspection  Erythema:  None  Ecchymosis:  None  Effusion:  Mild  Masses:  None  Lymphadenopathy: None    Range of Motion: Full - 0 to 130°    Medial Joint : Mild  Lateral Joint : Mild    Patellofemoral Tenderness: None  Patellofemoral Crepitus: None    Lachman:  Normal  Anterior Drawer: Normal  Posterior Drawer: Normal    Dilia's:  Negative  Apley's:  Negative    Varus Stress:  Stable  Valgus Stress: Stable    Strength:  5/5    Pulses:  Palpable distal    Sensation:  Intact          Imaging: X-rays of both knees reveals some degenerative changes that are severe bilateral.     Assessment: Primary osteoarthritis of both knees  -     Large Joint Aspiration/Injection: R knee, L knee        Plan:  Steroid today and then Euflexxa series    DISCLAIMER: This note may have been dictated using voice recognition software and may contain grammatical errors. Report sent to referring provider as required.

## 2020-01-14 NOTE — PROCEDURES
Large Joint Aspiration/Injection: R knee, L knee  Date/Time: 1/8/2020 1:15 PM  Performed by: Mehran Carrasco MD  Authorized by: Mehran Carrasco MD     Consent Done?:  Yes (Verbal)  Indications:  Pain  Timeout: Prior to procedure the correct patient, procedure, and site was verified      Location:  Knee  Site:  R knee and L knee  Prep: Patient was prepped and draped in usual sterile fashion    Approach:  Anteromedial  Medications:  40 mg triamcinolone acetonide 40 mg/mL; 40 mg triamcinolone acetonide 40 mg/mL

## 2020-01-24 ENCOUNTER — OFFICE VISIT (OUTPATIENT)
Dept: ORTHOPEDICS | Facility: CLINIC | Age: 70
End: 2020-01-24
Payer: MEDICARE

## 2020-01-24 DIAGNOSIS — M17.0 PRIMARY OSTEOARTHRITIS OF BOTH KNEES: Primary | ICD-10-CM

## 2020-01-24 PROCEDURE — 99499 NO LOS: ICD-10-PCS | Mod: HCNC,S$GLB,, | Performed by: ORTHOPAEDIC SURGERY

## 2020-01-24 PROCEDURE — 99499 UNLISTED E&M SERVICE: CPT | Mod: HCNC,S$GLB,, | Performed by: ORTHOPAEDIC SURGERY

## 2020-01-24 PROCEDURE — 20610 LARGE JOINT ASPIRATION/INJECTION: R KNEE, L KNEE: ICD-10-PCS | Mod: 50,HCNC,S$GLB, | Performed by: ORTHOPAEDIC SURGERY

## 2020-01-24 PROCEDURE — 20610 DRAIN/INJ JOINT/BURSA W/O US: CPT | Mod: 50,HCNC,S$GLB, | Performed by: ORTHOPAEDIC SURGERY

## 2020-01-24 PROCEDURE — 99999 PR PBB SHADOW E&M-EST. PATIENT-LVL II: CPT | Mod: PBBFAC,HCNC,, | Performed by: ORTHOPAEDIC SURGERY

## 2020-01-24 PROCEDURE — 99999 PR PBB SHADOW E&M-EST. PATIENT-LVL II: ICD-10-PCS | Mod: PBBFAC,HCNC,, | Performed by: ORTHOPAEDIC SURGERY

## 2020-01-24 NOTE — PROGRESS NOTES
Past Medical History:   Diagnosis Date    *Atrial flutter     Angina pectoris 2017    Anxiety     Arthritis     Asthma     Atrial fibrillation     Back pain     Cataract     OD    CHF (congestive heart failure)     COPD (chronic obstructive pulmonary disease)     Depression     Diabetes mellitus     Emphysema of lung     Heart failure     Hepatomegaly 2/3/2016    Hernia     History of MI (myocardial infarction) 2016    Hypercapnic respiratory failure, chronic 2016    Hyperlipidemia     Hypertension     Iron deficiency anemia 2/3/2016    Myocardial infarction     Obesity     Peripheral vascular disease     Pneumonia     Polyneuropathy     Retinal detachment     OS    Septic shock 2017    Skin ulcer 3/18/2017    Tobacco dependence     Type II 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, not stated as uncontrolled(250.60)        Past Surgical History:   Procedure Laterality Date    BREAST BIOPSY Left 10 yrs ago    benign    CATARACT EXTRACTION Left     OS      SECTION      x2    EYE SURGERY      HYSTERECTOMY      partial    OOPHORECTOMY      one ovary conserved    RETINAL DETACHMENT SURGERY      buckle --OS    TONSILLECTOMY         Current Outpatient Medications   Medication Sig    albuterol (ACCUNEB) 0.63 mg/3 mL Nebu Take 3 mLs (0.63 mg total) by nebulization every 6 (six) hours as needed. Rescue    albuterol (PROVENTIL/VENTOLIN HFA) 90 mcg/actuation inhaler INHALE 2 PUFFS INTO THE LUNGS EVERY 6 (SIX) HOURS AS NEEDED FOR RESCUE    apixaban (ELIQUIS) 5 mg Tab Take 1 tablet (5 mg total) by mouth 2 (two) times daily.    ascorbic acid, vitamin C, (VITAMIN C) 500 MG tablet Take 2 tablets (1,000 mg total) by mouth every evening.    atorvastatin (LIPITOR) 20 MG tablet TAKE ONE TABLET BY MOUTH EVERY DAY    blood-glucose meter (TRUE METRIX AIR GLUCOSE METER) kit AC meals. Insulin dependent.Dispence strips and lancets 3 months.     BREO ELLIPTA 100-25 mcg/dose diskus inhaler INHALE 1 PUFF INTO THE LUNGS ONCE DAILY.    budesonide (PULMICORT) 0.5 mg/2 mL nebulizer solution     cefadroxil (DURICEF) 500 MG Cap One capsule oral twice a day for one week.  Then once daily thereafter for prevention.  Take twice a day x 7 days whenever the cellulitis flares up.    clonazePAM (KLONOPIN) 0.5 MG tablet Take 1 tablet (0.5 mg total) by mouth 2 (two) times daily. TAKE ONE TABLET BY MOUTH TWO TIMES A DAY AS NEEDED. Medically necessary    DULoxetine (CYMBALTA) 30 MG capsule TAKE 1 CAPSULE ONCE DAILY    ferrous sulfate (FEOSOL) 325 mg (65 mg iron) Tab tablet Take 1 tablet (325 mg total) by mouth 2 (two) times daily.    fluticasone propionate (FLONASE) 50 mcg/actuation nasal spray 1 spray (50 mcg total) by Each Nostril route once daily.    furosemide (LASIX) 40 MG tablet Take 0.5 tablets (20 mg total) by mouth 3 (three) times a week.    gabapentin (NEURONTIN) 800 MG tablet TAKE ONE TABLET BY MOUTH THREE TIMES A DAY    HYDROcodone-acetaminophen (NORCO)  mg per tablet Take 1 tablet by mouth every 12 (twelve) hours as needed for Pain. Medical necessary    lancets Misc To check BG 3 times daily, to use with insurance preferred meter.    levalbuterol (XOPENEX) 0.63 mg/3 mL nebulizer solution INHALE THE CONTENTS OF 1 VIAL BY NEBULIZATION 4 times  DAILY.    DX: J43.9    lisinopril (PRINIVIL,ZESTRIL) 20 MG tablet TAKE ONE TABLET BY MOUTH EVERY DAY    metFORMIN (GLUCOPHAGE) 500 MG tablet TAKE 1 TABLET (500 MG TOTAL) BY MOUTH 2 (TWO) TIMES DAILY WITH MEALS.    multivitamin (THERAGRAN) tablet Take 1 tablet by mouth once daily.    nystatin (NYSTOP) powder Apply topically 2 (two) times daily.    nystatin-triamcinolone (MYCOLOG II) cream Apply topically 3 (three) times daily.    omega-3 acid ethyl esters (LOVAZA) 1 gram capsule 2 (two) times daily.     omeprazole (PRILOSEC) 20 MG capsule TAKE ONE CAPSULE BY MOUTH EVERY DAY    omeprazole (PRILOSEC)  40 MG capsule Take 1 capsule (40 mg total) by mouth once daily.    ondansetron (ZOFRAN-ODT) 8 MG TbDL Take 1 tablet (8 mg total) by mouth every 6 (six) hours as needed.    polyethylene glycol (GLYCOLAX) 17 gram/dose powder     potassium chloride SA (K-DUR,KLOR-CON) 20 MEQ tablet TAKE ONE TABLET BY MOUTH EVERY DAY    senna-docusate 8.6-50 mg (PERICOLACE) 8.6-50 mg per tablet Take 1 tablet by mouth 2 (two) times daily as needed for Constipation.    spironolactone (ALDACTONE) 25 MG tablet     traZODone (DESYREL) 100 MG tablet TAKE 1 & 1/2 TABLET BY MOUTH EVERY EVENING    TRUE METRIX GLUCOSE TEST STRIP Strp TEST BLOOD SUGAR THREE TIMES A DAY     loratadine (CLARITIN) 10 mg tablet Take 1 tablet (10 mg total) by mouth once daily.     No current facility-administered medications for this visit.        Allergies   Allergen Reactions    Dilaudid [Hydromorphone] Anaphylaxis     Other reaction(s): Anaphylaxis  Other reaction(s): Unknown       Family History   Problem Relation Age of Onset    Arthritis Father     Heart disease Father     Early death Sister 30        heart disease    Heart disease Sister 32        MI    Cancer Brother         Lung cancer    No Known Problems Daughter     Blindness Son         due to accident//    Arthritis Sister     Heart disease Sister     Crohn's disease Sister     Alcohol abuse Mother     Breast cancer Neg Hx     Glaucoma Neg Hx     Macular degeneration Neg Hx     Retinal detachment Neg Hx     Amblyopia Neg Hx     Diabetes Neg Hx     Cataracts Neg Hx     Hypertension Neg Hx     Strabismus Neg Hx     Stroke Neg Hx     Thyroid disease Neg Hx        Social History     Socioeconomic History    Marital status:      Spouse name: Not on file    Number of children: Not on file    Years of education: Not on file    Highest education level: Not on file   Occupational History    Not on file   Social Needs    Financial resource strain: Not on file    Food  insecurity:     Worry: Not on file     Inability: Not on file    Transportation needs:     Medical: Not on file     Non-medical: Not on file   Tobacco Use    Smoking status: Former Smoker     Packs/day: 0.25     Years: 50.00     Pack years: 12.50     Types: Cigarettes     Start date: 1968     Last attempt to quit: 2019     Years since quittin.4    Smokeless tobacco: Never Used   Substance and Sexual Activity    Alcohol use: No     Alcohol/week: 0.0 standard drinks     Frequency: Never    Drug use: No    Sexual activity: Not Currently   Lifestyle    Physical activity:     Days per week: Not on file     Minutes per session: Not on file    Stress: Not on file   Relationships    Social connections:     Talks on phone: Not on file     Gets together: Not on file     Attends Mu-ism service: Not on file     Active member of club or organization: Not on file     Attends meetings of clubs or organizations: Not on file     Relationship status: Not on file   Other Topics Concern    Not on file   Social History Narrative    Not on file       Chief Complaint:   Chief Complaint   Patient presents with    Injections     ORTHOVISC 1/3 BILATERAL KNEE       Consulting Physician: No ref. provider found    History of present illness: This is a 64-year-old young lady who reports the return of bilateral knee pain.  Pain today 6/10. No new injury. Previous injections helped.    Review of Systems:    Constitution: Denies chills, fever, and sweats.    HENT: Denies headaches. Reports blurry vision.    Cardiovascular: Denies chest pain or irregular heart beat.    Respiratory: Denies cough. Reports shortness of breath.    Gastrointestinal: Denies abdominal pain, nausea, or vomiting.    Musculoskeletal:  Denies muscle cramps.    Neurological: Denies dizziness or focal weakness.    Psychiatric/Behavioral: Normal mental status. Anxiety.    Hematologic/Lymphatic: Denies bleeding problem or easy  bruising/bleeding.    Skin: Denies rash or suspicious lesions.      Examination:    Vital Signs:    There were no vitals filed for this visit.    There is no height or weight on file to calculate BMI.    This a well-developed, well nourished patient in no acute distress.    Alert and oriented and cooperative to examination.       Physical Exam: Left Knee Exam    Gait   antalgic    Skin  Rash:   None  Scars:   None    Inspection  Erythema:  None  Ecchymosis:  None  Effusion:  Mild  Masses:  None  Lymphadenopathy: None    Range of Motion: Full - 0 to 130°    Medial Joint : Mild  Lateral Joint : Mild    Patellofemoral Tenderness: None  Patellofemoral Crepitus: None    Lachman:  Normal  Anterior Drawer: Normal  Posterior Drawer: Normal    Dilia's:  Negative  Apley's:  Negative    Varus Stress:  Stable  Valgus Stress: Stable    Strength:  5/5    Pulses:  Palpable distal    Sensation:  Intact      Right Knee Exam    Gait:   Antalgic    Skin  Rash:   None  Scars:   None    Inspection  Erythema:  None  Ecchymosis:  None  Effusion:  Mild  Masses:  None  Lymphadenopathy: None    Range of Motion: Full - 0 to 130°    Medial Joint : Mild  Lateral Joint : Mild    Patellofemoral Tenderness: None  Patellofemoral Crepitus: None    Lachman:  Normal  Anterior Drawer: Normal  Posterior Drawer: Normal    Dilia's:  Negative  Apley's:  Negative    Varus Stress:  Stable  Valgus Stress: Stable    Strength:  5/5    Pulses:  Palpable distal    Sensation:  Intact          Imaging: X-rays of both knees reveals degenerative changes that are severe bilateral.     Assessment: Primary osteoarthritis of both knees  -     Large Joint Aspiration/Injection: R knee, L knee        Plan: Euflexxa series    DISCLAIMER: This note may have been dictated using voice recognition software and may contain grammatical errors. Report sent to referring provider as required.

## 2020-01-24 NOTE — PROCEDURES
Large Joint Aspiration/Injection: R knee, L knee  Date/Time: 1/24/2020 9:45 AM  Performed by: Mehran Carrasco MD  Authorized by: Mehran Carrasco MD     Consent Done?:  Yes (Verbal)  Indications:  Pain  Timeout: Prior to procedure the correct patient, procedure, and site was verified      Location:  Knee  Site:  R knee and L knee  Prep: Patient was prepped and draped in usual sterile fashion    Approach:  Anteromedial  Medications:  15 mg sodium hyaluronate (orthovisc) 30 mg/2 mL; 15 mg sodium hyaluronate (orthovisc) 30 mg/2 mL

## 2020-01-25 ENCOUNTER — PATIENT OUTREACH (OUTPATIENT)
Dept: ADMINISTRATIVE | Facility: OTHER | Age: 70
End: 2020-01-25

## 2020-01-28 DIAGNOSIS — I34.0 MODERATE TO SEVERE MITRAL REGURGITATION: ICD-10-CM

## 2020-01-28 DIAGNOSIS — R93.1 ABNORMAL ECHOCARDIOGRAM: Primary | ICD-10-CM

## 2020-01-31 ENCOUNTER — OFFICE VISIT (OUTPATIENT)
Dept: ORTHOPEDICS | Facility: CLINIC | Age: 70
End: 2020-01-31
Payer: MEDICARE

## 2020-01-31 ENCOUNTER — PATIENT OUTREACH (OUTPATIENT)
Dept: ADMINISTRATIVE | Facility: OTHER | Age: 70
End: 2020-01-31

## 2020-01-31 VITALS — WEIGHT: 218.06 LBS | BODY MASS INDEX: 33.05 KG/M2 | HEIGHT: 68 IN

## 2020-01-31 DIAGNOSIS — M17.0 PRIMARY OSTEOARTHRITIS OF BOTH KNEES: Primary | ICD-10-CM

## 2020-01-31 PROCEDURE — 99499 NO LOS: ICD-10-PCS | Mod: HCNC,S$GLB,, | Performed by: ORTHOPAEDIC SURGERY

## 2020-01-31 PROCEDURE — 99999 PR PBB SHADOW E&M-EST. PATIENT-LVL II: CPT | Mod: PBBFAC,HCNC,, | Performed by: ORTHOPAEDIC SURGERY

## 2020-01-31 PROCEDURE — 20610 DRAIN/INJ JOINT/BURSA W/O US: CPT | Mod: 50,HCNC,S$GLB, | Performed by: ORTHOPAEDIC SURGERY

## 2020-01-31 PROCEDURE — 20610 LARGE JOINT ASPIRATION/INJECTION: R KNEE, L KNEE: ICD-10-PCS | Mod: 50,HCNC,S$GLB, | Performed by: ORTHOPAEDIC SURGERY

## 2020-01-31 PROCEDURE — 99499 UNLISTED E&M SERVICE: CPT | Mod: HCNC,S$GLB,, | Performed by: ORTHOPAEDIC SURGERY

## 2020-01-31 PROCEDURE — 99999 PR PBB SHADOW E&M-EST. PATIENT-LVL II: ICD-10-PCS | Mod: PBBFAC,HCNC,, | Performed by: ORTHOPAEDIC SURGERY

## 2020-01-31 NOTE — PROGRESS NOTES
Past Medical History:   Diagnosis Date    *Atrial flutter     Angina pectoris 2017    Anxiety     Arthritis     Asthma     Atrial fibrillation     Back pain     Cataract     OD    CHF (congestive heart failure)     COPD (chronic obstructive pulmonary disease)     Depression     Diabetes mellitus     Emphysema of lung     Heart failure     Hepatomegaly 2/3/2016    Hernia     History of MI (myocardial infarction) 2016    Hypercapnic respiratory failure, chronic 2016    Hyperlipidemia     Hypertension     Iron deficiency anemia 2/3/2016    Myocardial infarction     Obesity     Peripheral vascular disease     Pneumonia     Polyneuropathy     Retinal detachment     OS    Septic shock 2017    Skin ulcer 3/18/2017    Tobacco dependence     Type II 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, not stated as uncontrolled(250.60)        Past Surgical History:   Procedure Laterality Date    BREAST BIOPSY Left 10 yrs ago    benign    CATARACT EXTRACTION Left     OS      SECTION      x2    EYE SURGERY      HYSTERECTOMY      partial    OOPHORECTOMY      one ovary conserved    RETINAL DETACHMENT SURGERY      buckle --OS    TONSILLECTOMY         Current Outpatient Medications   Medication Sig    albuterol (ACCUNEB) 0.63 mg/3 mL Nebu Take 3 mLs (0.63 mg total) by nebulization every 6 (six) hours as needed. Rescue    albuterol (PROVENTIL/VENTOLIN HFA) 90 mcg/actuation inhaler INHALE 2 PUFFS INTO THE LUNGS EVERY 6 (SIX) HOURS AS NEEDED FOR RESCUE    apixaban (ELIQUIS) 5 mg Tab Take 1 tablet (5 mg total) by mouth 2 (two) times daily.    ascorbic acid, vitamin C, (VITAMIN C) 500 MG tablet Take 2 tablets (1,000 mg total) by mouth every evening.    atorvastatin (LIPITOR) 20 MG tablet TAKE ONE TABLET BY MOUTH EVERY DAY    blood-glucose meter (TRUE METRIX AIR GLUCOSE METER) kit AC meals. Insulin dependent.Dispence strips and lancets 3 months.     BREO ELLIPTA 100-25 mcg/dose diskus inhaler INHALE 1 PUFF INTO THE LUNGS ONCE DAILY.    budesonide (PULMICORT) 0.5 mg/2 mL nebulizer solution     cefadroxil (DURICEF) 500 MG Cap One capsule oral twice a day for one week.  Then once daily thereafter for prevention.  Take twice a day x 7 days whenever the cellulitis flares up.    clonazePAM (KLONOPIN) 0.5 MG tablet Take 1 tablet (0.5 mg total) by mouth 2 (two) times daily. TAKE ONE TABLET BY MOUTH TWO TIMES A DAY AS NEEDED. Medically necessary    DULoxetine (CYMBALTA) 30 MG capsule TAKE 1 CAPSULE ONCE DAILY    ferrous sulfate (FEOSOL) 325 mg (65 mg iron) Tab tablet Take 1 tablet (325 mg total) by mouth 2 (two) times daily.    fluticasone propionate (FLONASE) 50 mcg/actuation nasal spray 1 spray (50 mcg total) by Each Nostril route once daily.    furosemide (LASIX) 40 MG tablet Take 0.5 tablets (20 mg total) by mouth 3 (three) times a week.    gabapentin (NEURONTIN) 800 MG tablet TAKE ONE TABLET BY MOUTH THREE TIMES A DAY    HYDROcodone-acetaminophen (NORCO)  mg per tablet Take 1 tablet by mouth every 12 (twelve) hours as needed for Pain. Medical necessary    lancets Misc To check BG 3 times daily, to use with insurance preferred meter.    levalbuterol (XOPENEX) 0.63 mg/3 mL nebulizer solution INHALE THE CONTENTS OF 1 VIAL BY NEBULIZATION 4 times  DAILY.    DX: J43.9    lisinopril (PRINIVIL,ZESTRIL) 20 MG tablet TAKE ONE TABLET BY MOUTH EVERY DAY    metFORMIN (GLUCOPHAGE) 500 MG tablet TAKE 1 TABLET (500 MG TOTAL) BY MOUTH 2 (TWO) TIMES DAILY WITH MEALS.    multivitamin (THERAGRAN) tablet Take 1 tablet by mouth once daily.    nystatin (NYSTOP) powder Apply topically 2 (two) times daily.    nystatin-triamcinolone (MYCOLOG II) cream Apply topically 3 (three) times daily.    omega-3 acid ethyl esters (LOVAZA) 1 gram capsule 2 (two) times daily.     omeprazole (PRILOSEC) 20 MG capsule TAKE ONE CAPSULE BY MOUTH EVERY DAY    omeprazole (PRILOSEC)  40 MG capsule Take 1 capsule (40 mg total) by mouth once daily.    ondansetron (ZOFRAN-ODT) 8 MG TbDL Take 1 tablet (8 mg total) by mouth every 6 (six) hours as needed.    polyethylene glycol (GLYCOLAX) 17 gram/dose powder     potassium chloride SA (K-DUR,KLOR-CON) 20 MEQ tablet TAKE ONE TABLET BY MOUTH EVERY DAY    senna-docusate 8.6-50 mg (PERICOLACE) 8.6-50 mg per tablet Take 1 tablet by mouth 2 (two) times daily as needed for Constipation.    spironolactone (ALDACTONE) 25 MG tablet     traZODone (DESYREL) 100 MG tablet TAKE 1 & 1/2 TABLET BY MOUTH EVERY EVENING    TRUE METRIX GLUCOSE TEST STRIP Strp TEST BLOOD SUGAR THREE TIMES A DAY     loratadine (CLARITIN) 10 mg tablet Take 1 tablet (10 mg total) by mouth once daily.     No current facility-administered medications for this visit.        Allergies   Allergen Reactions    Dilaudid [Hydromorphone] Anaphylaxis     Other reaction(s): Anaphylaxis  Other reaction(s): Unknown       Family History   Problem Relation Age of Onset    Arthritis Father     Heart disease Father     Early death Sister 30        heart disease    Heart disease Sister 32        MI    Cancer Brother         Lung cancer    No Known Problems Daughter     Blindness Son         due to accident//    Arthritis Sister     Heart disease Sister     Crohn's disease Sister     Alcohol abuse Mother     Breast cancer Neg Hx     Glaucoma Neg Hx     Macular degeneration Neg Hx     Retinal detachment Neg Hx     Amblyopia Neg Hx     Diabetes Neg Hx     Cataracts Neg Hx     Hypertension Neg Hx     Strabismus Neg Hx     Stroke Neg Hx     Thyroid disease Neg Hx        Social History     Socioeconomic History    Marital status:      Spouse name: Not on file    Number of children: Not on file    Years of education: Not on file    Highest education level: Not on file   Occupational History    Not on file   Social Needs    Financial resource strain: Not on file    Food  insecurity:     Worry: Not on file     Inability: Not on file    Transportation needs:     Medical: Not on file     Non-medical: Not on file   Tobacco Use    Smoking status: Former Smoker     Packs/day: 0.25     Years: 50.00     Pack years: 12.50     Types: Cigarettes     Start date: 1968     Last attempt to quit: 2019     Years since quittin.4    Smokeless tobacco: Never Used   Substance and Sexual Activity    Alcohol use: No     Alcohol/week: 0.0 standard drinks     Frequency: Never    Drug use: No    Sexual activity: Not Currently   Lifestyle    Physical activity:     Days per week: Not on file     Minutes per session: Not on file    Stress: Not on file   Relationships    Social connections:     Talks on phone: Not on file     Gets together: Not on file     Attends Zoroastrian service: Not on file     Active member of club or organization: Not on file     Attends meetings of clubs or organizations: Not on file     Relationship status: Not on file   Other Topics Concern    Not on file   Social History Narrative    Not on file       Chief Complaint:   Chief Complaint   Patient presents with    Injections     ORTHOVISC 2/3 BILATERAL KNEE       Consulting Physician: No ref. provider found    History of present illness: This is a 64-year-old young lady who reports the return of bilateral knee pain.  Pain today 6/10. No new injury. Previous injections helped.    Review of Systems:    Constitution: Denies chills, fever, and sweats.    HENT: Denies headaches. Reports blurry vision.    Cardiovascular: Denies chest pain or irregular heart beat.    Respiratory: Denies cough. Reports shortness of breath.    Gastrointestinal: Denies abdominal pain, nausea, or vomiting.    Musculoskeletal:  Denies muscle cramps.    Neurological: Denies dizziness or focal weakness.    Psychiatric/Behavioral: Normal mental status. Anxiety.    Hematologic/Lymphatic: Denies bleeding problem or easy  bruising/bleeding.    Skin: Denies rash or suspicious lesions.      Examination:    Vital Signs:    There were no vitals filed for this visit.    Body mass index is 33.15 kg/m².    This a well-developed, well nourished patient in no acute distress.    Alert and oriented and cooperative to examination.       Physical Exam: Left Knee Exam    Gait   antalgic    Skin  Rash:   None  Scars:   None    Inspection  Erythema:  None  Ecchymosis:  None  Effusion:  Mild  Masses:  None  Lymphadenopathy: None    Range of Motion: Full - 0 to 130°    Medial Joint : Mild  Lateral Joint : Mild    Patellofemoral Tenderness: None  Patellofemoral Crepitus: None    Lachman:  Normal  Anterior Drawer: Normal  Posterior Drawer: Normal    Dilia's:  Negative  Apley's:  Negative    Varus Stress:  Stable  Valgus Stress: Stable    Strength:  5/5    Pulses:  Palpable distal    Sensation:  Intact      Right Knee Exam    Gait:   Antalgic    Skin  Rash:   None  Scars:   None    Inspection  Erythema:  None  Ecchymosis:  None  Effusion:  Mild  Masses:  None  Lymphadenopathy: None    Range of Motion: Full - 0 to 130°    Medial Joint : Mild  Lateral Joint : Mild    Patellofemoral Tenderness: None  Patellofemoral Crepitus: None    Lachman:  Normal  Anterior Drawer: Normal  Posterior Drawer: Normal    Dilia's:  Negative  Apley's:  Negative    Varus Stress:  Stable  Valgus Stress: Stable    Strength:  5/5    Pulses:  Palpable distal    Sensation:  Intact          Imaging: X-rays of both knees reveals degenerative changes that are severe bilateral.     Assessment: Primary osteoarthritis of both knees  -     Large Joint Aspiration/Injection: R knee, L knee        Plan: Euflexxa series    DISCLAIMER: This note may have been dictated using voice recognition software and may contain grammatical errors. Report sent to referring provider as required.

## 2020-01-31 NOTE — PROCEDURES
Large Joint Aspiration/Injection: R knee, L knee  Date/Time: 1/31/2020 10:00 AM  Performed by: Mehran Carrasco MD  Authorized by: Mehran Carrasco MD     Consent Done?:  Yes (Verbal)  Indications:  Pain  Timeout: Prior to procedure the correct patient, procedure, and site was verified      Location:  Knee  Site:  R knee and L knee  Prep: Patient was prepped and draped in usual sterile fashion    Approach:  Anteromedial  Medications:  20 mg sodium hyaluronate (EUFLEXXA) 10 mg/mL(mw 2.4 -3.6 million); 20 mg sodium hyaluronate (EUFLEXXA) 10 mg/mL(mw 2.4 -3.6 million)

## 2020-02-05 ENCOUNTER — TELEPHONE (OUTPATIENT)
Dept: FAMILY MEDICINE | Facility: CLINIC | Age: 70
End: 2020-02-05

## 2020-02-05 DIAGNOSIS — M51.36 LUMBAR DEGENERATIVE DISC DISEASE: ICD-10-CM

## 2020-02-05 DIAGNOSIS — M43.12 SPONDYLOLISTHESIS OF CERVICAL REGION: ICD-10-CM

## 2020-02-05 NOTE — TELEPHONE ENCOUNTER
LR--1-7-2020  LOV--12-2-19  FOV--3-  Urine Toxicology--12-6-18  Pain Contract--12-6-18  Checked [place] Prescription Monitoring Program site. No unusual or abnormal behavior noted.

## 2020-02-06 ENCOUNTER — PATIENT OUTREACH (OUTPATIENT)
Dept: ADMINISTRATIVE | Facility: OTHER | Age: 70
End: 2020-02-06

## 2020-02-06 RX ORDER — HYDROCODONE BITARTRATE AND ACETAMINOPHEN 10; 325 MG/1; MG/1
1 TABLET ORAL EVERY 12 HOURS PRN
Qty: 60 TABLET | Refills: 0 | Status: SHIPPED | OUTPATIENT
Start: 2020-02-06 | End: 2020-03-03 | Stop reason: SDUPTHER

## 2020-02-07 ENCOUNTER — OFFICE VISIT (OUTPATIENT)
Dept: ORTHOPEDICS | Facility: CLINIC | Age: 70
End: 2020-02-07
Payer: MEDICARE

## 2020-02-07 DIAGNOSIS — M17.0 PRIMARY OSTEOARTHRITIS OF BOTH KNEES: Primary | ICD-10-CM

## 2020-02-07 PROCEDURE — 99499 UNLISTED E&M SERVICE: CPT | Mod: HCNC,S$GLB,, | Performed by: ORTHOPAEDIC SURGERY

## 2020-02-07 PROCEDURE — 20610 DRAIN/INJ JOINT/BURSA W/O US: CPT | Mod: 50,HCNC,S$GLB, | Performed by: ORTHOPAEDIC SURGERY

## 2020-02-07 PROCEDURE — 99999 PR PBB SHADOW E&M-EST. PATIENT-LVL II: CPT | Mod: PBBFAC,HCNC,, | Performed by: ORTHOPAEDIC SURGERY

## 2020-02-07 PROCEDURE — 99999 PR PBB SHADOW E&M-EST. PATIENT-LVL II: ICD-10-PCS | Mod: PBBFAC,HCNC,, | Performed by: ORTHOPAEDIC SURGERY

## 2020-02-07 PROCEDURE — 99499 NO LOS: ICD-10-PCS | Mod: HCNC,S$GLB,, | Performed by: ORTHOPAEDIC SURGERY

## 2020-02-07 PROCEDURE — 20610 LARGE JOINT ASPIRATION/INJECTION: BILATERAL KNEE: ICD-10-PCS | Mod: 50,HCNC,S$GLB, | Performed by: ORTHOPAEDIC SURGERY

## 2020-02-10 NOTE — PROCEDURES
Large Joint Aspiration/Injection: bilateral knee  Performed by: Mehran Carrasco MD  Authorized by: Mehran Carrasco MD  Date/Time: 2/7/2020 10:00 AM    Consent Done?:  Yes (Verbal)  Indications:  pain  Timeout: Immediately prior to procedure a time out was called to verify the correct patient, procedure, equipment, support staff and site/side marked as required.        Details:  Approach: anteromedial  Location:  Knee  Site:  Bilateral knee    Medications (Right): 15 mg sodium hyaluronate (orthovisc) 30 mg/2 mL  Medications (Left): 15 mg sodium hyaluronate (orthovisc) 30 mg/2 mL

## 2020-02-10 NOTE — PROGRESS NOTES
Past Medical History:   Diagnosis Date    *Atrial flutter     Angina pectoris 2017    Anxiety     Arthritis     Asthma     Atrial fibrillation     Back pain     Cataract     OD    CHF (congestive heart failure)     COPD (chronic obstructive pulmonary disease)     Depression     Diabetes mellitus     Emphysema of lung     Heart failure     Hepatomegaly 2/3/2016    Hernia     History of MI (myocardial infarction) 2016    Hypercapnic respiratory failure, chronic 2016    Hyperlipidemia     Hypertension     Iron deficiency anemia 2/3/2016    Myocardial infarction     Obesity     Peripheral vascular disease     Pneumonia     Polyneuropathy     Retinal detachment     OS    Septic shock 2017    Skin ulcer 3/18/2017    Tobacco dependence     Type II 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, not stated as uncontrolled(250.60)        Past Surgical History:   Procedure Laterality Date    BREAST BIOPSY Left 10 yrs ago    benign    CATARACT EXTRACTION Left     OS      SECTION      x2    EYE SURGERY      HYSTERECTOMY      partial    OOPHORECTOMY      one ovary conserved    RETINAL DETACHMENT SURGERY      buckle --OS    TONSILLECTOMY         Current Outpatient Medications   Medication Sig    albuterol (ACCUNEB) 0.63 mg/3 mL Nebu Take 3 mLs (0.63 mg total) by nebulization every 6 (six) hours as needed. Rescue    albuterol (PROVENTIL/VENTOLIN HFA) 90 mcg/actuation inhaler INHALE 2 PUFFS INTO THE LUNGS EVERY 6 (SIX) HOURS AS NEEDED FOR RESCUE    apixaban (ELIQUIS) 5 mg Tab Take 1 tablet (5 mg total) by mouth 2 (two) times daily.    ascorbic acid, vitamin C, (VITAMIN C) 500 MG tablet Take 2 tablets (1,000 mg total) by mouth every evening.    atorvastatin (LIPITOR) 20 MG tablet TAKE ONE TABLET BY MOUTH EVERY DAY    blood-glucose meter (TRUE METRIX AIR GLUCOSE METER) kit AC meals. Insulin dependent.Dispence strips and lancets 3 months.     BREO ELLIPTA 100-25 mcg/dose diskus inhaler INHALE 1 PUFF INTO THE LUNGS ONCE DAILY.    budesonide (PULMICORT) 0.5 mg/2 mL nebulizer solution     cefadroxil (DURICEF) 500 MG Cap One capsule oral twice a day for one week.  Then once daily thereafter for prevention.  Take twice a day x 7 days whenever the cellulitis flares up.    clonazePAM (KLONOPIN) 0.5 MG tablet Take 1 tablet (0.5 mg total) by mouth 2 (two) times daily. TAKE ONE TABLET BY MOUTH TWO TIMES A DAY AS NEEDED. Medically necessary    DULoxetine (CYMBALTA) 30 MG capsule TAKE 1 CAPSULE ONCE DAILY    ferrous sulfate (FEOSOL) 325 mg (65 mg iron) Tab tablet Take 1 tablet (325 mg total) by mouth 2 (two) times daily.    fluticasone propionate (FLONASE) 50 mcg/actuation nasal spray 1 spray (50 mcg total) by Each Nostril route once daily.    furosemide (LASIX) 40 MG tablet Take 0.5 tablets (20 mg total) by mouth 3 (three) times a week.    gabapentin (NEURONTIN) 800 MG tablet TAKE ONE TABLET BY MOUTH THREE TIMES A DAY    HYDROcodone-acetaminophen (NORCO)  mg per tablet Take 1 tablet by mouth every 12 (twelve) hours as needed for Pain. Medical necessary    lancets Misc To check BG 3 times daily, to use with insurance preferred meter.    levalbuterol (XOPENEX) 0.63 mg/3 mL nebulizer solution INHALE THE CONTENTS OF 1 VIAL BY NEBULIZATION 4 times  DAILY.    DX: J43.9    lisinopril (PRINIVIL,ZESTRIL) 20 MG tablet TAKE ONE TABLET BY MOUTH EVERY DAY    loratadine (CLARITIN) 10 mg tablet Take 1 tablet (10 mg total) by mouth once daily.    metFORMIN (GLUCOPHAGE) 500 MG tablet TAKE 1 TABLET (500 MG TOTAL) BY MOUTH 2 (TWO) TIMES DAILY WITH MEALS.    multivitamin (THERAGRAN) tablet Take 1 tablet by mouth once daily.    nystatin (NYSTOP) powder Apply topically 2 (two) times daily.    nystatin-triamcinolone (MYCOLOG II) cream Apply topically 3 (three) times daily.    omega-3 acid ethyl esters (LOVAZA) 1 gram capsule 2 (two) times daily.      omeprazole (PRILOSEC) 20 MG capsule TAKE ONE CAPSULE BY MOUTH EVERY DAY    omeprazole (PRILOSEC) 40 MG capsule Take 1 capsule (40 mg total) by mouth once daily.    ondansetron (ZOFRAN-ODT) 8 MG TbDL Take 1 tablet (8 mg total) by mouth every 6 (six) hours as needed.    polyethylene glycol (GLYCOLAX) 17 gram/dose powder     potassium chloride SA (K-DUR,KLOR-CON) 20 MEQ tablet TAKE ONE TABLET BY MOUTH EVERY DAY    senna-docusate 8.6-50 mg (PERICOLACE) 8.6-50 mg per tablet Take 1 tablet by mouth 2 (two) times daily as needed for Constipation.    spironolactone (ALDACTONE) 25 MG tablet     traZODone (DESYREL) 100 MG tablet TAKE 1 & 1/2 TABLET BY MOUTH EVERY EVENING    TRUE METRIX GLUCOSE TEST STRIP Strp TEST BLOOD SUGAR THREE TIMES A DAY      No current facility-administered medications for this visit.        Allergies   Allergen Reactions    Dilaudid [Hydromorphone] Anaphylaxis     Other reaction(s): Anaphylaxis  Other reaction(s): Unknown       Family History   Problem Relation Age of Onset    Arthritis Father     Heart disease Father     Early death Sister 30        heart disease    Heart disease Sister 32        MI    Cancer Brother         Lung cancer    No Known Problems Daughter     Blindness Son         due to accident//    Arthritis Sister     Heart disease Sister     Crohn's disease Sister     Alcohol abuse Mother     Breast cancer Neg Hx     Glaucoma Neg Hx     Macular degeneration Neg Hx     Retinal detachment Neg Hx     Amblyopia Neg Hx     Diabetes Neg Hx     Cataracts Neg Hx     Hypertension Neg Hx     Strabismus Neg Hx     Stroke Neg Hx     Thyroid disease Neg Hx        Social History     Socioeconomic History    Marital status:      Spouse name: Not on file    Number of children: Not on file    Years of education: Not on file    Highest education level: Not on file   Occupational History    Not on file   Social Needs    Financial resource strain: Not on file     Food insecurity:     Worry: Not on file     Inability: Not on file    Transportation needs:     Medical: Not on file     Non-medical: Not on file   Tobacco Use    Smoking status: Former Smoker     Packs/day: 0.25     Years: 50.00     Pack years: 12.50     Types: Cigarettes     Start date: 1968     Last attempt to quit: 2019     Years since quittin.5    Smokeless tobacco: Never Used   Substance and Sexual Activity    Alcohol use: No     Alcohol/week: 0.0 standard drinks     Frequency: Never    Drug use: No    Sexual activity: Not Currently   Lifestyle    Physical activity:     Days per week: Not on file     Minutes per session: Not on file    Stress: Not on file   Relationships    Social connections:     Talks on phone: Not on file     Gets together: Not on file     Attends Anabaptism service: Not on file     Active member of club or organization: Not on file     Attends meetings of clubs or organizations: Not on file     Relationship status: Not on file   Other Topics Concern    Not on file   Social History Narrative    Not on file       Chief Complaint:   Chief Complaint   Patient presents with    Knee Pain     ORTHOVISC BILATERAL KNEE 3/3       Consulting Physician: No ref. provider found    History of present illness: This is a 64-year-old young lady who reports the return of bilateral knee pain.  Pain today 6/10. No new injury. Previous injections helped.    Review of Systems:    Constitution: Denies chills, fever, and sweats.    HENT: Denies headaches. Reports blurry vision.    Cardiovascular: Denies chest pain or irregular heart beat.    Respiratory: Denies cough. Reports shortness of breath.    Gastrointestinal: Denies abdominal pain, nausea, or vomiting.    Musculoskeletal:  Denies muscle cramps.    Neurological: Denies dizziness or focal weakness.    Psychiatric/Behavioral: Normal mental status. Anxiety.    Hematologic/Lymphatic: Denies bleeding problem or easy  bruising/bleeding.    Skin: Denies rash or suspicious lesions.      Examination:    Vital Signs:    There were no vitals filed for this visit.    There is no height or weight on file to calculate BMI.    This a well-developed, well nourished patient in no acute distress.    Alert and oriented and cooperative to examination.       Physical Exam: Left Knee Exam    Gait   antalgic    Skin  Rash:   None  Scars:   None    Inspection  Erythema:  None  Ecchymosis:  None  Effusion:  Mild  Masses:  None  Lymphadenopathy: None    Range of Motion: Full - 0 to 130°    Medial Joint : Mild  Lateral Joint : Mild    Patellofemoral Tenderness: None  Patellofemoral Crepitus: None    Lachman:  Normal  Anterior Drawer: Normal  Posterior Drawer: Normal    Dilia's:  Negative  Apley's:  Negative    Varus Stress:  Stable  Valgus Stress: Stable    Strength:  5/5    Pulses:  Palpable distal    Sensation:  Intact      Right Knee Exam    Gait:   Antalgic    Skin  Rash:   None  Scars:   None    Inspection  Erythema:  None  Ecchymosis:  None  Effusion:  Mild  Masses:  None  Lymphadenopathy: None    Range of Motion: Full - 0 to 130°    Medial Joint : Mild  Lateral Joint : Mild    Patellofemoral Tenderness: None  Patellofemoral Crepitus: None    Lachman:  Normal  Anterior Drawer: Normal  Posterior Drawer: Normal    Dilia's:  Negative  Apley's:  Negative    Varus Stress:  Stable  Valgus Stress: Stable    Strength:  5/5    Pulses:  Palpable distal    Sensation:  Intact          Imaging: X-rays of both knees reveals degenerative changes that are severe bilateral.     Assessment: Primary osteoarthritis of both knees  -     Large Joint Aspiration/Injection: L knee        Plan: Euflexxa series    DISCLAIMER: This note may have been dictated using voice recognition software and may contain grammatical errors. Report sent to referring provider as required.

## 2020-02-18 DIAGNOSIS — J41.8 MIXED SIMPLE AND MUCOPURULENT CHRONIC BRONCHITIS: ICD-10-CM

## 2020-02-18 DIAGNOSIS — K31.84 GASTROPARESIS DIABETICORUM: ICD-10-CM

## 2020-02-18 DIAGNOSIS — R11.11 NON-INTRACTABLE VOMITING WITHOUT NAUSEA, UNSPECIFIED VOMITING TYPE: ICD-10-CM

## 2020-02-18 DIAGNOSIS — E11.59 HYPERTENSION ASSOCIATED WITH DIABETES: ICD-10-CM

## 2020-02-18 DIAGNOSIS — I10 HYPERTENSION, UNSPECIFIED TYPE: ICD-10-CM

## 2020-02-18 DIAGNOSIS — I15.2 HYPERTENSION ASSOCIATED WITH DIABETES: ICD-10-CM

## 2020-02-18 DIAGNOSIS — E11.43 GASTROPARESIS DIABETICORUM: ICD-10-CM

## 2020-02-18 RX ORDER — ATORVASTATIN CALCIUM 20 MG/1
20 TABLET, FILM COATED ORAL DAILY
Qty: 90 TABLET | Refills: 3 | Status: SHIPPED | OUTPATIENT
Start: 2020-02-18 | End: 2021-03-19

## 2020-02-18 RX ORDER — ONDANSETRON 8 MG/1
8 TABLET, ORALLY DISINTEGRATING ORAL EVERY 6 HOURS PRN
Qty: 20 TABLET | Refills: 3 | Status: SHIPPED | OUTPATIENT
Start: 2020-02-18 | End: 2020-05-11

## 2020-02-18 RX ORDER — FLUTICASONE FUROATE AND VILANTEROL TRIFENATATE 100; 25 UG/1; UG/1
POWDER RESPIRATORY (INHALATION)
Qty: 60 EACH | Refills: 3 | Status: SHIPPED | OUTPATIENT
Start: 2020-02-18 | End: 2020-05-21

## 2020-02-18 RX ORDER — FLUTICASONE FUROATE AND VILANTEROL 100; 25 UG/1; UG/1
POWDER RESPIRATORY (INHALATION)
Qty: 180 EACH | Refills: 2 | OUTPATIENT
Start: 2020-02-18

## 2020-02-18 RX ORDER — TRAZODONE HYDROCHLORIDE 150 MG/1
TABLET ORAL
Qty: 90 TABLET | Refills: 3 | Status: SHIPPED | OUTPATIENT
Start: 2020-02-18 | End: 2020-03-10

## 2020-02-24 ENCOUNTER — PATIENT OUTREACH (OUTPATIENT)
Dept: ADMINISTRATIVE | Facility: HOSPITAL | Age: 70
End: 2020-02-24

## 2020-02-24 RX ORDER — METFORMIN HYDROCHLORIDE 500 MG/1
500 TABLET ORAL 2 TIMES DAILY WITH MEALS
Qty: 180 TABLET | Refills: 3 | Status: CANCELLED | OUTPATIENT
Start: 2020-02-24

## 2020-02-24 NOTE — TELEPHONE ENCOUNTER
Received fax from [facility] requesting refill of Metformin.  Please advise.     LR--2-8-19  LOV--12-2-19  FOV--3-10-20

## 2020-02-24 NOTE — PROGRESS NOTES
Chart review completed 02/24/2020.  Care Everywhere updates requested and reviewed.  Immunizations reconciled. Media reviewed.     Letter mailed.  YES (PORTAL)    Health Maintenance Due   Topic Date Due    Shingles Vaccine (1 of 2) 11/15/2001    Naloxone Prescription  12/19/2018    Foot Exam  06/02/2019    Urine Drug Screen  06/06/2019    Eye Exam  09/10/2019

## 2020-02-24 NOTE — LETTER
March 3, 2020    Nelly Tian  [address] [place] [address]             [facility]  [address]  [facility] [address]  Phone: [phone] [facility] is committed to your overall health.  To help you get the most out of each of your visits, we will review your information to make sure you are up to date on all of your recommended tests and/or procedures.       Dr. Constantine Bird MD has found that your chart shows you may be due for a:     DIABETIC EYE EXAM   DIABETIC FOOT EXAM     If you have had any of the above done at another facility, please bring the records or information with you so that your record at [facility] will be complete.  If you would like to schedule any of these, please contact the clinic at [phone].      If you are currently taking medication, please bring it with you to your appointment for review.     Also, if you have any type of Advanced Directives, please bring them with you to your office visit so we may scan them into your chart.     Thank You,     Your [facility] Team,   MD Shayna Teague LPN Clinical Care Coordinator   [facility]   [address] [place] [address]   Phone [phone]   Fax [phone]

## 2020-02-26 RX ORDER — METFORMIN HYDROCHLORIDE 500 MG/1
500 TABLET ORAL 2 TIMES DAILY WITH MEALS
Qty: 180 TABLET | Refills: 3 | Status: SHIPPED | OUTPATIENT
Start: 2020-02-26 | End: 2020-03-10

## 2020-02-26 NOTE — TELEPHONE ENCOUNTER
Received fax from [facility] requesting refill of Metformin.  Please advise.     LR--2-8-19  LOV-- 12-2-19  FOV-- 3-

## 2020-03-02 DIAGNOSIS — M51.36 LUMBAR DEGENERATIVE DISC DISEASE: ICD-10-CM

## 2020-03-03 DIAGNOSIS — I10 HYPERTENSION, UNSPECIFIED TYPE: ICD-10-CM

## 2020-03-03 DIAGNOSIS — M43.12 SPONDYLOLISTHESIS OF CERVICAL REGION: ICD-10-CM

## 2020-03-03 DIAGNOSIS — M51.36 LUMBAR DEGENERATIVE DISC DISEASE: ICD-10-CM

## 2020-03-03 RX ORDER — APIXABAN 5 MG/1
TABLET, FILM COATED ORAL
Qty: 180 TABLET | Refills: 0 | Status: SHIPPED | OUTPATIENT
Start: 2020-03-03 | End: 2020-05-21

## 2020-03-03 RX ORDER — CLONAZEPAM 0.5 MG/1
0.5 TABLET ORAL 2 TIMES DAILY
Qty: 60 TABLET | Refills: 2 | Status: CANCELLED | OUTPATIENT
Start: 2020-03-03

## 2020-03-03 RX ORDER — HYDROCODONE BITARTRATE AND ACETAMINOPHEN 10; 325 MG/1; MG/1
1 TABLET ORAL EVERY 12 HOURS PRN
Qty: 60 TABLET | Refills: 0 | Status: SHIPPED | OUTPATIENT
Start: 2020-03-03 | End: 2020-03-10 | Stop reason: SDUPTHER

## 2020-03-03 RX ORDER — CLONAZEPAM 0.5 MG/1
0.5 TABLET ORAL 2 TIMES DAILY
Qty: 60 TABLET | Refills: 1 | Status: SHIPPED | OUTPATIENT
Start: 2020-03-03 | End: 2020-05-28 | Stop reason: SDUPTHER

## 2020-03-03 NOTE — TELEPHONE ENCOUNTER
LR--2-6-2020  LOV--12-2-19  FOV--3-  Pain Clinic Drug Screen--12-6-18  Pain Contract--12-6-18  Checked [place] Prescription Monitoring Program site. No unusual or abnormal behavior noted.

## 2020-03-03 NOTE — PROGRESS NOTES
"Attempted to outreach patient for pre-visit via "[employer]", no answer after a week. Sending outreach via Mail Out Letter now.    "

## 2020-03-09 ENCOUNTER — TELEPHONE (OUTPATIENT)
Dept: FAMILY MEDICINE | Facility: CLINIC | Age: 70
End: 2020-03-09

## 2020-03-09 NOTE — TELEPHONE ENCOUNTER
Attempted to contact patient regarding appointment that is scheduled for 03/10/2020  , not able to leave message.

## 2020-03-10 ENCOUNTER — OFFICE VISIT (OUTPATIENT)
Dept: FAMILY MEDICINE | Facility: CLINIC | Age: 70
End: 2020-03-10
Payer: MEDICARE

## 2020-03-10 ENCOUNTER — CLINICAL SUPPORT (OUTPATIENT)
Dept: FAMILY MEDICINE | Facility: CLINIC | Age: 70
End: 2020-03-10
Attending: FAMILY MEDICINE
Payer: MEDICARE

## 2020-03-10 ENCOUNTER — LAB VISIT (OUTPATIENT)
Dept: LAB | Facility: HOSPITAL | Age: 70
End: 2020-03-10
Attending: FAMILY MEDICINE
Payer: MEDICARE

## 2020-03-10 VITALS — BODY MASS INDEX: 33.08 KG/M2 | HEIGHT: 68 IN | WEIGHT: 218.25 LBS | TEMPERATURE: 98 F

## 2020-03-10 DIAGNOSIS — E11.40 TYPE 2 DIABETES MELLITUS WITH DIABETIC NEUROPATHY, WITHOUT LONG-TERM CURRENT USE OF INSULIN: ICD-10-CM

## 2020-03-10 DIAGNOSIS — D50.8 IRON DEFICIENCY ANEMIA SECONDARY TO INADEQUATE DIETARY IRON INTAKE: ICD-10-CM

## 2020-03-10 DIAGNOSIS — I10 HYPERTENSION, UNSPECIFIED TYPE: ICD-10-CM

## 2020-03-10 DIAGNOSIS — I50.22 CHRONIC SYSTOLIC CONGESTIVE HEART FAILURE: ICD-10-CM

## 2020-03-10 DIAGNOSIS — M43.12 SPONDYLOLISTHESIS OF CERVICAL REGION: ICD-10-CM

## 2020-03-10 DIAGNOSIS — Z51.89 ENCOUNTER FOR PATIENT COMPLIANCE MONITORING IN DRUG TREATMENT PROGRAM: Primary | ICD-10-CM

## 2020-03-10 DIAGNOSIS — I50.9 CONGESTIVE HEART FAILURE, NYHA CLASS 3, UNSPECIFIED CONGESTIVE HEART FAILURE TYPE: ICD-10-CM

## 2020-03-10 DIAGNOSIS — F43.20 ADULT SITUATIONAL STRESS DISORDER: ICD-10-CM

## 2020-03-10 DIAGNOSIS — K21.9 GASTROESOPHAGEAL REFLUX DISEASE, ESOPHAGITIS PRESENCE NOT SPECIFIED: ICD-10-CM

## 2020-03-10 DIAGNOSIS — M51.36 LUMBAR DEGENERATIVE DISC DISEASE: ICD-10-CM

## 2020-03-10 DIAGNOSIS — K31.84 GASTROPARESIS DIABETICORUM: ICD-10-CM

## 2020-03-10 DIAGNOSIS — E11.43 GASTROPARESIS DIABETICORUM: ICD-10-CM

## 2020-03-10 DIAGNOSIS — G89.4 CHRONIC PAIN SYNDROME: ICD-10-CM

## 2020-03-10 DIAGNOSIS — Z72.0 TOBACCO ABUSE: ICD-10-CM

## 2020-03-10 LAB
ANION GAP SERPL CALC-SCNC: 10 MMOL/L (ref 8–16)
BASOPHILS # BLD AUTO: 0.04 K/UL (ref 0–0.2)
BASOPHILS NFR BLD: 0.4 % (ref 0–1.9)
BUN SERPL-MCNC: 13 MG/DL (ref 8–23)
CALCIUM SERPL-MCNC: 9.5 MG/DL (ref 8.7–10.5)
CHLORIDE SERPL-SCNC: 97 MMOL/L (ref 95–110)
CHOLEST SERPL-MCNC: 100 MG/DL (ref 120–199)
CHOLEST/HDLC SERPL: 2.1 {RATIO} (ref 2–5)
CO2 SERPL-SCNC: 31 MMOL/L (ref 23–29)
CREAT SERPL-MCNC: 0.6 MG/DL (ref 0.5–1.4)
DIFFERENTIAL METHOD: ABNORMAL
EOSINOPHIL # BLD AUTO: 0.1 K/UL (ref 0–0.5)
EOSINOPHIL NFR BLD: 0.6 % (ref 0–8)
ERYTHROCYTE [DISTWIDTH] IN BLOOD BY AUTOMATED COUNT: 17.2 % (ref 11.5–14.5)
EST. GFR  (AFRICAN AMERICAN): >60 ML/MIN/1.73 M^2
EST. GFR  (NON AFRICAN AMERICAN): >60 ML/MIN/1.73 M^2
FERRITIN SERPL-MCNC: 20 NG/ML (ref 20–300)
GLUCOSE SERPL-MCNC: 76 MG/DL (ref 70–110)
HCT VFR BLD AUTO: 43.1 % (ref 37–48.5)
HDLC SERPL-MCNC: 48 MG/DL (ref 40–75)
HDLC SERPL: 48 % (ref 20–50)
HGB BLD-MCNC: 11.7 G/DL (ref 12–16)
IMM GRANULOCYTES # BLD AUTO: 0.03 K/UL (ref 0–0.04)
IMM GRANULOCYTES NFR BLD AUTO: 0.3 % (ref 0–0.5)
IRON SERPL-MCNC: 15 UG/DL (ref 30–160)
LDLC SERPL CALC-MCNC: 37.6 MG/DL (ref 63–159)
LYMPHOCYTES # BLD AUTO: 1.3 K/UL (ref 1–4.8)
LYMPHOCYTES NFR BLD: 13.2 % (ref 18–48)
MCH RBC QN AUTO: 25.1 PG (ref 27–31)
MCHC RBC AUTO-ENTMCNC: 27.1 G/DL (ref 32–36)
MCV RBC AUTO: 93 FL (ref 82–98)
MONOCYTES # BLD AUTO: 1.2 K/UL (ref 0.3–1)
MONOCYTES NFR BLD: 12.4 % (ref 4–15)
NEUTROPHILS # BLD AUTO: 6.9 K/UL (ref 1.8–7.7)
NEUTROPHILS NFR BLD: 73.1 % (ref 38–73)
NONHDLC SERPL-MCNC: 52 MG/DL
NRBC BLD-RTO: 0 /100 WBC
PLATELET # BLD AUTO: 248 K/UL (ref 150–350)
PMV BLD AUTO: 10.5 FL (ref 9.2–12.9)
POTASSIUM SERPL-SCNC: 4.9 MMOL/L (ref 3.5–5.1)
RBC # BLD AUTO: 4.66 M/UL (ref 4–5.4)
SATURATED IRON: 3 % (ref 20–50)
SODIUM SERPL-SCNC: 138 MMOL/L (ref 136–145)
TOTAL IRON BINDING CAPACITY: 443 UG/DL (ref 250–450)
TRANSFERRIN SERPL-MCNC: 299 MG/DL (ref 200–375)
TRIGL SERPL-MCNC: 72 MG/DL (ref 30–150)
WBC # BLD AUTO: 9.44 K/UL (ref 3.9–12.7)

## 2020-03-10 PROCEDURE — 80048 BASIC METABOLIC PNL TOTAL CA: CPT | Mod: HCNC

## 2020-03-10 PROCEDURE — 2024F PR 7 FIELD PHOTOS WITH INTERP/ REVIEW: ICD-10-PCS | Mod: HCNC,S$GLB,, | Performed by: FAMILY MEDICINE

## 2020-03-10 PROCEDURE — 99214 PR OFFICE/OUTPT VISIT, EST, LEVL IV, 30-39 MIN: ICD-10-PCS | Mod: HCNC,S$GLB,, | Performed by: FAMILY MEDICINE

## 2020-03-10 PROCEDURE — 82728 ASSAY OF FERRITIN: CPT | Mod: HCNC

## 2020-03-10 PROCEDURE — 92250 DIABETIC EYE SCREENING PHOTO: ICD-10-PCS | Mod: 26,HCNC,S$GLB, | Performed by: OPHTHALMOLOGY

## 2020-03-10 PROCEDURE — 1101F PR PT FALLS ASSESS DOC 0-1 FALLS W/OUT INJ PAST YR: ICD-10-PCS | Mod: HCNC,CPTII,S$GLB, | Performed by: FAMILY MEDICINE

## 2020-03-10 PROCEDURE — 2022F DIABETIC EYE SCREENING PHOTO: ICD-10-PCS | Mod: HCNC,S$GLB,, | Performed by: OPHTHALMOLOGY

## 2020-03-10 PROCEDURE — 2024F 7 FLD RTA PHOTO EVC RTNOPTHY: CPT | Mod: HCNC,S$GLB,, | Performed by: FAMILY MEDICINE

## 2020-03-10 PROCEDURE — 3044F HG A1C LEVEL LT 7.0%: CPT | Mod: HCNC,CPTII,S$GLB, | Performed by: FAMILY MEDICINE

## 2020-03-10 PROCEDURE — 99499 UNLISTED E&M SERVICE: CPT | Mod: S$GLB,,, | Performed by: FAMILY MEDICINE

## 2020-03-10 PROCEDURE — 1159F PR MEDICATION LIST DOCUMENTED IN MEDICAL RECORD: ICD-10-PCS | Mod: HCNC,S$GLB,, | Performed by: FAMILY MEDICINE

## 2020-03-10 PROCEDURE — 92250 FUNDUS PHOTOGRAPHY W/I&R: CPT | Mod: 26,HCNC,S$GLB, | Performed by: OPHTHALMOLOGY

## 2020-03-10 PROCEDURE — 83036 HEMOGLOBIN GLYCOSYLATED A1C: CPT | Mod: HCNC

## 2020-03-10 PROCEDURE — 1126F AMNT PAIN NOTED NONE PRSNT: CPT | Mod: HCNC,S$GLB,, | Performed by: FAMILY MEDICINE

## 2020-03-10 PROCEDURE — 3044F PR MOST RECENT HEMOGLOBIN A1C LEVEL <7.0%: ICD-10-PCS | Mod: HCNC,CPTII,S$GLB, | Performed by: FAMILY MEDICINE

## 2020-03-10 PROCEDURE — 2022F DILAT RTA XM EVC RTNOPTHY: CPT | Mod: HCNC,S$GLB,, | Performed by: OPHTHALMOLOGY

## 2020-03-10 PROCEDURE — 80307 DRUG TEST PRSMV CHEM ANLYZR: CPT | Mod: HCNC

## 2020-03-10 PROCEDURE — 85025 COMPLETE CBC W/AUTO DIFF WBC: CPT | Mod: HCNC

## 2020-03-10 PROCEDURE — 99999 PR PBB SHADOW E&M-EST. PATIENT-LVL III: CPT | Mod: PBBFAC,HCNC,, | Performed by: FAMILY MEDICINE

## 2020-03-10 PROCEDURE — 83540 ASSAY OF IRON: CPT | Mod: HCNC

## 2020-03-10 PROCEDURE — 99999 PR PBB SHADOW E&M-EST. PATIENT-LVL I: CPT | Mod: PBBFAC,HCNC,,

## 2020-03-10 PROCEDURE — 36415 COLL VENOUS BLD VENIPUNCTURE: CPT | Mod: HCNC,PO

## 2020-03-10 PROCEDURE — 1101F PT FALLS ASSESS-DOCD LE1/YR: CPT | Mod: HCNC,CPTII,S$GLB, | Performed by: FAMILY MEDICINE

## 2020-03-10 PROCEDURE — 1126F PR PAIN SEVERITY QUANTIFIED, NO PAIN PRESENT: ICD-10-PCS | Mod: HCNC,S$GLB,, | Performed by: FAMILY MEDICINE

## 2020-03-10 PROCEDURE — 99214 OFFICE O/P EST MOD 30 MIN: CPT | Mod: HCNC,S$GLB,, | Performed by: FAMILY MEDICINE

## 2020-03-10 PROCEDURE — 1159F MED LIST DOCD IN RCRD: CPT | Mod: HCNC,S$GLB,, | Performed by: FAMILY MEDICINE

## 2020-03-10 PROCEDURE — 99999 PR PBB SHADOW E&M-EST. PATIENT-LVL I: ICD-10-PCS | Mod: PBBFAC,HCNC,,

## 2020-03-10 PROCEDURE — 80061 LIPID PANEL: CPT | Mod: HCNC

## 2020-03-10 PROCEDURE — 99999 PR PBB SHADOW E&M-EST. PATIENT-LVL III: ICD-10-PCS | Mod: PBBFAC,HCNC,, | Performed by: FAMILY MEDICINE

## 2020-03-10 PROCEDURE — 99499 RISK ADDL DX/OHS AUDIT: ICD-10-PCS | Mod: S$GLB,,, | Performed by: FAMILY MEDICINE

## 2020-03-10 RX ORDER — DOXYCYCLINE HYCLATE 100 MG
100 TABLET ORAL 2 TIMES DAILY
Qty: 20 TABLET | Refills: 1 | Status: SHIPPED | OUTPATIENT
Start: 2020-03-10 | End: 2020-07-29 | Stop reason: SDUPTHER

## 2020-03-10 RX ORDER — TRAZODONE HYDROCHLORIDE 50 MG/1
75 TABLET ORAL NIGHTLY
Qty: 135 TABLET | Refills: 3 | Status: SHIPPED | OUTPATIENT
Start: 2020-03-10 | End: 2020-04-30 | Stop reason: SDUPTHER

## 2020-03-10 RX ORDER — METFORMIN HYDROCHLORIDE 500 MG/1
500 TABLET ORAL
Qty: 180 TABLET | Refills: 3
Start: 2020-03-10 | End: 2020-07-22 | Stop reason: SDUPTHER

## 2020-03-10 RX ORDER — OMEPRAZOLE 20 MG/1
20 CAPSULE, DELAYED RELEASE ORAL
Qty: 180 CAPSULE | Refills: 3 | Status: SHIPPED | OUTPATIENT
Start: 2020-03-10 | End: 2020-09-10

## 2020-03-10 RX ORDER — FERROUS SULFATE 325(65) MG
325 TABLET ORAL DAILY
Qty: 60 TABLET | Refills: 3
Start: 2020-03-10 | End: 2021-08-13 | Stop reason: SDUPTHER

## 2020-03-10 RX ORDER — FUROSEMIDE 40 MG/1
20 TABLET ORAL
Qty: 6 TABLET | Refills: 2
Start: 2020-03-11 | End: 2020-07-29 | Stop reason: SDUPTHER

## 2020-03-10 RX ORDER — LISINOPRIL 20 MG/1
20 TABLET ORAL 2 TIMES DAILY
Qty: 90 TABLET | Refills: 11 | Status: SHIPPED | OUTPATIENT
Start: 2020-03-10 | End: 2021-05-27

## 2020-03-10 RX ORDER — HYDROCODONE BITARTRATE AND ACETAMINOPHEN 10; 325 MG/1; MG/1
1 TABLET ORAL EVERY 12 HOURS PRN
Qty: 60 TABLET | Refills: 0 | Status: SHIPPED | OUTPATIENT
Start: 2020-03-10 | End: 2020-04-02 | Stop reason: SDUPTHER

## 2020-03-10 NOTE — PATIENT INSTRUCTIONS

## 2020-03-10 NOTE — PROGRESS NOTES
Nelly Tian is a 69 y.o. female here for a diabetic eye screening with non-dilated fundus photos per Dr. Bird    Patient cooperative yes  Small pupils no  Last eye exam:9/10/18     For exam results, see Encounter Report.

## 2020-03-11 LAB
ESTIMATED AVG GLUCOSE: 111 MG/DL (ref 68–131)
HBA1C MFR BLD HPLC: 5.5 % (ref 4–5.6)

## 2020-03-14 LAB
6MAM UR QL: NOT DETECTED
7AMINOCLONAZEPAM UR QL: PRESENT
A-OH ALPRAZ UR QL: NOT DETECTED
ALPRAZ UR QL: NOT DETECTED
AMPHET UR QL SCN: NOT DETECTED
ANNOTATION COMMENT IMP: NORMAL
ANNOTATION COMMENT IMP: NORMAL
BARBITURATES UR QL: NOT DETECTED
BUPRENORPHINE UR QL: NOT DETECTED
BZE UR QL: NOT DETECTED
CARBOXYTHC UR QL: NOT DETECTED
CARISOPRODOL UR QL: NOT DETECTED
CLONAZEPAM UR QL: NOT DETECTED
CODEINE UR QL: NOT DETECTED
CREAT UR-MCNC: 55.1 MG/DL (ref 20–400)
DIAZEPAM UR QL: NOT DETECTED
ETHYL GLUCURONIDE UR QL: NOT DETECTED
FENTANYL UR QL: NOT DETECTED
HYDROCODONE UR QL: PRESENT
HYDROMORPHONE UR QL: NOT DETECTED
LORAZEPAM UR QL: NOT DETECTED
MDA UR QL: NOT DETECTED
MDEA UR QL: NOT DETECTED
MDMA UR QL: NOT DETECTED
ME-PHENIDATE UR QL: NOT DETECTED
MEPERIDINE UR QL: NOT DETECTED
METHADONE UR QL: NOT DETECTED
METHAMPHET UR QL: NOT DETECTED
MIDAZOLAM UR QL SCN: NOT DETECTED
MORPHINE UR QL: NOT DETECTED
NORBUPRENORPHINE UR QL CFM: NOT DETECTED
NORDIAZEPAM UR QL: NOT DETECTED
NORFENTANYL UR QL: NOT DETECTED
NORHYDROCODONE UR QL CFM: NOT DETECTED
NOROXYCODONE UR QL CFM: NOT DETECTED
NOROXYMORPHONE: NOT DETECTED
OXAZEPAM UR QL: NOT DETECTED
OXYCODONE UR QL: NOT DETECTED
OXYMORPHONE UR QL: NOT DETECTED
PATHOLOGY STUDY: NORMAL
PCP UR QL: NOT DETECTED
PHENTERMINE UR QL: NOT DETECTED
PROPOXYPH UR QL: NOT DETECTED
SERVICE CMNT-IMP: NORMAL
TAPENTADOL UR QL SCN: NOT DETECTED
TAPENTADOL-O-SULF: NOT DETECTED
TEMAZEPAM UR QL: NOT DETECTED
TRAMADOL UR QL: NOT DETECTED
ZOLPIDEM UR QL: NOT DETECTED

## 2020-03-15 NOTE — PROGRESS NOTES
Subjective:       Patient ID: Nelly Tian is a 69 y.o. female.    Chief Complaint: cough and cold and med check    Ms. Tian presents to the clinic today for medication documentation for Norco which she takes for chronic pain and clonazepam which she takes for anxiety.  She has a huge ventral hernia. She has COPD and CHF, and she uses portable oxygen. She states she  has a new portable O2 machine.  She is still smoking 1/2 ppd and would like to quit.. She also states her depression is not controlled, however she is not taking full dose of trazodone prescribed (taking 100 mg daily).      Review of Systems   Constitutional: Positive for unexpected weight change. Negative for chills and fever.   HENT: Negative for congestion, ear pain and sinus pressure.    Respiratory: Positive for shortness of breath and wheezing. Negative for cough.    Cardiovascular: Positive for palpitations. Negative for chest pain and leg swelling.   Gastrointestinal: Negative for abdominal pain, constipation and diarrhea.   Musculoskeletal: Positive for arthralgias and back pain.   Skin: Positive for rash and wound (abdomen, chronic, sees wound care).   Neurological: Positive for tremors.   Psychiatric/Behavioral: Positive for behavioral problems, confusion, decreased concentration and sleep disturbance. The patient is nervous/anxious.        Objective:      Physical Exam   Constitutional: She is oriented to person, place, and time. She appears well-nourished. No distress.   HENT:   Head: Normocephalic and atraumatic.   Right Ear: External ear normal.   Left Ear: External ear normal.   Mouth/Throat: Oropharynx is clear and moist. No oropharyngeal exudate.   Eyes: Pupils are equal, round, and reactive to light. Right eye exhibits no discharge. Left eye exhibits no discharge.   Neck: Neck supple. No thyromegaly present.   Cardiovascular: Normal rate and regular rhythm. Exam reveals no gallop and no friction rub.   No murmur  heard.  Pulmonary/Chest: Effort normal. No respiratory distress. She has decreased breath sounds. She has wheezes in the right lower field and the left lower field. She has no rales.   Abdominal:       Huge ventral hernia. Left upper quadrant induration.   Lymphadenopathy:     She has no cervical adenopathy.   Neurological: She is alert and oriented to person, place, and time. She has normal strength. She displays no atrophy. No cranial nerve deficit. Coordination normal.   Skin: Skin is warm and dry. Rash noted.        Psychiatric: She has a normal mood and affect. Her behavior is normal. Thought content normal.   Vitals reviewed.            Current Outpatient Medications:     albuterol (ACCUNEB) 0.63 mg/3 mL Nebu, Take 3 mLs (0.63 mg total) by nebulization every 6 (six) hours as needed. Rescue, Disp: 75 mL, Rfl: 11    albuterol (PROVENTIL/VENTOLIN HFA) 90 mcg/actuation inhaler, INHALE 2 PUFFS INTO THE LUNGS EVERY 6 (SIX) HOURS AS NEEDED FOR RESCUE, Disp: 54 g, Rfl: 3    ascorbic acid, vitamin C, (VITAMIN C) 500 MG tablet, Take 2 tablets (1,000 mg total) by mouth every evening., Disp: , Rfl:     atorvastatin (LIPITOR) 20 MG tablet, Take 1 tablet (20 mg total) by mouth once daily., Disp: 90 tablet, Rfl: 3    blood-glucose meter (TRUE METRIX AIR GLUCOSE METER) kit, AC meals. Insulin dependent.Dispence strips and lancets 3 months., Disp: 1 each, Rfl: 0    BREO ELLIPTA 100-25 mcg/dose diskus inhaler, INHALE 1 PUFF INTO THE LUNGS ONCE DAILY., Disp: 60 each, Rfl: 3    clonazePAM (KLONOPIN) 0.5 MG tablet, Take 1 tablet (0.5 mg total) by mouth 2 (two) times daily. TAKE ONE TABLET BY MOUTH TWO TIMES A DAY AS NEEDED. Medically necessary, Disp: 60 tablet, Rfl: 1    ELIQUIS 5 mg Tab, TAKE ONE TABLET BY MOUTH TWO TIMES A DAY, Disp: 180 tablet, Rfl: 0    ferrous sulfate (FEOSOL) 325 mg (65 mg iron) Tab tablet, Take 1 tablet (325 mg total) by mouth once daily., Disp: 60 tablet, Rfl: 3    fluticasone propionate (FLONASE)  50 mcg/actuation nasal spray, 1 spray (50 mcg total) by Each Nostril route once daily., Disp: 1 Bottle, Rfl: 3    furosemide (LASIX) 40 MG tablet, Take 0.5 tablets (20 mg total) by mouth 3 (three) times a week., Disp: 6 tablet, Rfl: 2    gabapentin (NEURONTIN) 800 MG tablet, TAKE ONE TABLET BY MOUTH THREE TIMES A DAY, Disp: 270 tablet, Rfl: 3    HYDROcodone-acetaminophen (NORCO)  mg per tablet, Take 1 tablet by mouth every 12 (twelve) hours as needed for Pain. Medical necessary, Disp: 60 tablet, Rfl: 0    HYDROcodone-acetaminophen (NORCO)  mg per tablet, Take 1 tablet by mouth every 12 (twelve) hours as needed for Pain. Medical necessary, Disp: 60 tablet, Rfl: 0    lancets Misc, To check BG 3 times daily, to use with insurance preferred meter., Disp: 300 each, Rfl: 11    levalbuterol (XOPENEX) 0.63 mg/3 mL nebulizer solution, INHALE THE CONTENTS OF 1 VIAL BY NEBULIZATION 4 times  DAILY.    DX: J43.9, Disp: 792 mL, Rfl: 0    lisinopriL (PRINIVIL,ZESTRIL) 20 MG tablet, Take 1 tablet (20 mg total) by mouth 2 (two) times daily., Disp: 90 tablet, Rfl: 11    loratadine (CLARITIN) 10 mg tablet, Take 1 tablet (10 mg total) by mouth once daily., Disp: , Rfl: 0    metFORMIN (GLUCOPHAGE) 500 MG tablet, Take 1 tablet (500 mg total) by mouth daily with breakfast., Disp: 180 tablet, Rfl: 3    multivitamin (THERAGRAN) tablet, Take 1 tablet by mouth once daily., Disp: , Rfl:     nystatin (NYSTOP) powder, Apply topically 2 (two) times daily., Disp: 120 g, Rfl: 11    nystatin-triamcinolone (MYCOLOG II) cream, Apply topically 3 (three) times daily., Disp: 30 g, Rfl: 1    omega-3 acid ethyl esters (LOVAZA) 1 gram capsule, 2 (two) times daily. , Disp: , Rfl:     omeprazole (PRILOSEC) 20 MG capsule, Take 1 capsule (20 mg total) by mouth 2 (two) times daily before meals., Disp: 180 capsule, Rfl: 3    ondansetron (ZOFRAN-ODT) 8 MG TbDL, Take 1 tablet (8 mg total) by mouth every 6 (six) hours as needed., Disp: 20  tablet, Rfl: 3    polyethylene glycol (GLYCOLAX) 17 gram/dose powder, , Disp: , Rfl:     potassium chloride SA (K-DUR,KLOR-CON) 20 MEQ tablet, TAKE ONE TABLET BY MOUTH EVERY DAY, Disp: 30 tablet, Rfl: 11    senna-docusate 8.6-50 mg (PERICOLACE) 8.6-50 mg per tablet, Take 1 tablet by mouth 2 (two) times daily as needed for Constipation., Disp: , Rfl:     traZODone (DESYREL) 50 MG tablet, Take 1.5 tablets (75 mg total) by mouth every evening., Disp: 135 tablet, Rfl: 3    TRUE METRIX GLUCOSE TEST STRIP Strp, TEST BLOOD SUGAR THREE TIMES A DAY , Disp: 300 strip, Rfl: 3    doxycycline (VIBRA-TABS) 100 MG tablet, Take 1 tablet (100 mg total) by mouth 2 (two) times daily., Disp: 20 tablet, Rfl: 1    omeprazole (PRILOSEC) 20 MG capsule, TAKE ONE CAPSULE BY MOUTH EVERY DAY (Patient not taking: Reported on 3/10/2020), Disp: 30 capsule, Rfl: 11  No current facility-administered medications for this visit.     Facility-Administered Medications Ordered in Other Visits:     sodium hyaluronate (orthovisc) Syrg 15 mg, 15 mg, , , Mehran Carrasco MD, 15 mg at 02/07/20 1000    sodium hyaluronate (orthovisc) Syrg 15 mg, 15 mg, , , Mehran Carrasco MD, 15 mg at 02/07/20 1000  Assessment:       1. Encounter for patient compliance monitoring in drug treatment program    2. Hypertension, unspecified type    3. Chronic systolic congestive heart failure    4. Type 2 diabetes mellitus with diabetic neuropathy, without long-term current use of insulin    5. Lumbar degenerative disc disease    6. Congestive heart failure, NYHA class 3, unspecified congestive heart failure type    7. Chronic pain syndrome    8. Gastroparesis diabeticorum    9. Iron deficiency anemia secondary to inadequate dietary iron intake    10. Adult situational stress disorder    11. Gastroesophageal reflux disease, esophagitis presence not specified    12. Spondylolisthesis of cervical region    13. Tobacco abuse        Plan:       DDD (degenerative disc  disease), lumbar  Stable, continue current medication.  Nelly was seen today for cough and cold and med check.    Diagnoses and all orders for this visit:    Encounter for patient compliance monitoring in drug treatment program  -     Pain Clinic Drug Screen    Hypertension, unspecified type    Chronic systolic congestive heart failure  -     furosemide (LASIX) 40 MG tablet; Take 0.5 tablets (20 mg total) by mouth 3 (three) times a week.    Type 2 diabetes mellitus with diabetic neuropathy, without long-term current use of insulin  -     Diabetic Eye Screening Photo; Future  -     Hemoglobin A1c; Future  -     Basic metabolic panel; Future  -     Lipid panel; Future  -     CBC auto differential; Future    Lumbar degenerative disc disease  -     HYDROcodone-acetaminophen (NORCO)  mg per tablet; Take 1 tablet by mouth every 12 (twelve) hours as needed for Pain. Medical necessary  -     HYDROcodone-acetaminophen (NORCO)  mg per tablet; Take 1 tablet by mouth every 12 (twelve) hours as needed for Pain. Medical necessary    Congestive heart failure, NYHA class 3, unspecified congestive heart failure type    Chronic pain syndrome    Gastroparesis diabeticorum    Iron deficiency anemia secondary to inadequate dietary iron intake  -     CBC auto differential; Future  -     Ferritin; Future  -     Iron and TIBC; Future    Adult situational stress disorder  -     traZODone (DESYREL) 50 MG tablet; Take 1.5 tablets (75 mg total) by mouth every evening.    Gastroesophageal reflux disease, esophagitis presence not specified  -     omeprazole (PRILOSEC) 20 MG capsule; Take 1 capsule (20 mg total) by mouth 2 (two) times daily before meals.    Spondylolisthesis of cervical region  -     HYDROcodone-acetaminophen (NORCO)  mg per tablet; Take 1 tablet by mouth every 12 (twelve) hours as needed for Pain. Medical necessary  -     HYDROcodone-acetaminophen (NORCO)  mg per tablet; Take 1 tablet by mouth every  12 (twelve) hours as needed for Pain. Medical necessary    Tobacco abuse  -     Ambulatory referral/consult to Smoking Cessation Program; Future    Other orders  -     doxycycline (VIBRA-TABS) 100 MG tablet; Take 1 tablet (100 mg total) by mouth 2 (two) times daily.  -     metFORMIN (GLUCOPHAGE) 500 MG tablet; Take 1 tablet (500 mg total) by mouth daily with breakfast.  -     lisinopriL (PRINIVIL,ZESTRIL) 20 MG tablet; Take 1 tablet (20 mg total) by mouth 2 (two) times daily.  -     ferrous sulfate (FEOSOL) 325 mg (65 mg iron) Tab tablet; Take 1 tablet (325 mg total) by mouth once daily.        Hypertension associated with diabetes  Stable, continue current medication.    Congestive heart failure, unspecified HF chronicity, unspecified heart failure type  Stable, continue current medication.    Other nonthrombocytopenic purpura    Major depression, recurrent, chronic  Increase trazodone to prescribed dosage daily.    Dependency on pain medication  Narcan request sent   Discussed risk for overdose   Contract 12/18  [place]  checked [place] evidence of diversion  [place] screen 12/18    Morbid obesity with BMI of 40.0-44.9, adult  Patient readiness: acceptance and barriers:none    During the course of the visit the patient was educated and counseled about the following:     Obesity:   General weight loss/lifestyle modification strategies discussed (elicit support from others; identify saboteurs; non-food rewards, etc).    Goals: Diabetes: Maintain Hemoglobin A1C below 7, Hypertension: Reduce Blood Pressure and Obesity: Reduce calorie intake and BMI    Did patient meet goals/outcomes: Yes    The following self management tools provided: declined    Patient Instructions (the written plan) was given to the patient/family.     Time spent with patient: 30 minutes    Barriers to medications present (no )    Adverse reactions to current medications (no)    Over the counter medications reviewed (Yes)            Discussed health  maintenance guidelines appropriate for age.

## 2020-03-17 ENCOUNTER — TELEPHONE (OUTPATIENT)
Dept: FAMILY MEDICINE | Facility: CLINIC | Age: 70
End: 2020-03-17

## 2020-03-18 RX ORDER — ALBUTEROL SULFATE 90 UG/1
AEROSOL, METERED RESPIRATORY (INHALATION)
Qty: 54 G | Refills: 3 | Status: SHIPPED | OUTPATIENT
Start: 2020-03-18 | End: 2020-10-28 | Stop reason: SDUPTHER

## 2020-03-21 ENCOUNTER — PATIENT MESSAGE (OUTPATIENT)
Dept: FAMILY MEDICINE | Facility: CLINIC | Age: 70
End: 2020-03-21

## 2020-03-21 DIAGNOSIS — J44.0 CHRONIC OBSTRUCTIVE PULMONARY DISEASE WITH LOWER RESPIRATORY INFECTION: Primary | ICD-10-CM

## 2020-03-22 RX ORDER — AMOXICILLIN AND CLAVULANATE POTASSIUM 875; 125 MG/1; MG/1
1 TABLET, FILM COATED ORAL 2 TIMES DAILY
Qty: 20 TABLET | Refills: 0 | Status: SHIPPED | OUTPATIENT
Start: 2020-03-22 | End: 2020-09-10 | Stop reason: ALTCHOICE

## 2020-04-02 ENCOUNTER — PATIENT MESSAGE (OUTPATIENT)
Dept: FAMILY MEDICINE | Facility: CLINIC | Age: 70
End: 2020-04-02

## 2020-04-02 DIAGNOSIS — M51.36 LUMBAR DEGENERATIVE DISC DISEASE: ICD-10-CM

## 2020-04-02 DIAGNOSIS — M43.12 SPONDYLOLISTHESIS OF CERVICAL REGION: ICD-10-CM

## 2020-04-02 RX ORDER — HYDROCODONE BITARTRATE AND ACETAMINOPHEN 10; 325 MG/1; MG/1
1 TABLET ORAL EVERY 12 HOURS PRN
Qty: 60 TABLET | Refills: 0 | Status: SHIPPED | OUTPATIENT
Start: 2020-04-02 | End: 2020-04-30 | Stop reason: SDUPTHER

## 2020-04-02 NOTE — TELEPHONE ENCOUNTER
Please send prescription for Hydrocodone to [facility]; prescription was sent to the wrong pharmacy. Checked  and prescription last picked up on 3-5-2020.

## 2020-04-30 ENCOUNTER — PATIENT MESSAGE (OUTPATIENT)
Dept: FAMILY MEDICINE | Facility: CLINIC | Age: 70
End: 2020-04-30

## 2020-04-30 DIAGNOSIS — J43.9 PULMONARY EMPHYSEMA, UNSPECIFIED EMPHYSEMA TYPE: ICD-10-CM

## 2020-04-30 DIAGNOSIS — M43.12 SPONDYLOLISTHESIS OF CERVICAL REGION: ICD-10-CM

## 2020-04-30 DIAGNOSIS — R09.81 CONGESTION OF NASAL SINUS: ICD-10-CM

## 2020-04-30 DIAGNOSIS — M51.36 LUMBAR DEGENERATIVE DISC DISEASE: ICD-10-CM

## 2020-04-30 DIAGNOSIS — F43.20 ADULT SITUATIONAL STRESS DISORDER: ICD-10-CM

## 2020-05-01 RX ORDER — HYDROCODONE BITARTRATE AND ACETAMINOPHEN 10; 325 MG/1; MG/1
1 TABLET ORAL EVERY 12 HOURS PRN
Qty: 60 TABLET | Refills: 0 | Status: SHIPPED | OUTPATIENT
Start: 2020-05-01 | End: 2020-05-28 | Stop reason: SDUPTHER

## 2020-05-01 RX ORDER — POTASSIUM CHLORIDE 20 MEQ/1
20 TABLET, EXTENDED RELEASE ORAL DAILY
Qty: 30 TABLET | Refills: 11 | Status: SHIPPED | OUTPATIENT
Start: 2020-05-01 | End: 2020-07-31 | Stop reason: SDUPTHER

## 2020-05-01 RX ORDER — LEVALBUTEROL INHALATION SOLUTION 0.63 MG/3ML
SOLUTION RESPIRATORY (INHALATION)
Qty: 792 ML | Refills: 0 | Status: SHIPPED | OUTPATIENT
Start: 2020-05-01 | End: 2020-06-28 | Stop reason: SDUPTHER

## 2020-05-01 RX ORDER — TRAZODONE HYDROCHLORIDE 50 MG/1
75 TABLET ORAL NIGHTLY
Qty: 135 TABLET | Refills: 3 | Status: SHIPPED | OUTPATIENT
Start: 2020-05-01 | End: 2020-09-10 | Stop reason: ALTCHOICE

## 2020-05-01 RX ORDER — FLUTICASONE PROPIONATE 50 MCG
1 SPRAY, SUSPENSION (ML) NASAL DAILY
Qty: 1 G | Refills: 3 | Status: SHIPPED | OUTPATIENT
Start: 2020-05-01 | End: 2020-08-24 | Stop reason: SDUPTHER

## 2020-05-05 ENCOUNTER — PATIENT MESSAGE (OUTPATIENT)
Dept: ADMINISTRATIVE | Facility: HOSPITAL | Age: 70
End: 2020-05-05

## 2020-05-11 DIAGNOSIS — R11.11 NON-INTRACTABLE VOMITING WITHOUT NAUSEA, UNSPECIFIED VOMITING TYPE: ICD-10-CM

## 2020-05-11 DIAGNOSIS — K31.84 GASTROPARESIS DIABETICORUM: ICD-10-CM

## 2020-05-11 DIAGNOSIS — E11.43 GASTROPARESIS DIABETICORUM: ICD-10-CM

## 2020-05-11 RX ORDER — ONDANSETRON 8 MG/1
TABLET, ORALLY DISINTEGRATING ORAL
Qty: 270 TABLET | Refills: 3 | Status: SHIPPED | OUTPATIENT
Start: 2020-05-11 | End: 2021-02-09

## 2020-05-21 ENCOUNTER — PES CALL (OUTPATIENT)
Dept: ADMINISTRATIVE | Facility: CLINIC | Age: 70
End: 2020-05-21

## 2020-05-21 DIAGNOSIS — I10 HYPERTENSION, UNSPECIFIED TYPE: ICD-10-CM

## 2020-05-21 DIAGNOSIS — J41.8 MIXED SIMPLE AND MUCOPURULENT CHRONIC BRONCHITIS: ICD-10-CM

## 2020-05-21 RX ORDER — APIXABAN 5 MG/1
TABLET, FILM COATED ORAL
Qty: 180 TABLET | Refills: 0 | Status: SHIPPED | OUTPATIENT
Start: 2020-05-21 | End: 2020-06-28 | Stop reason: SDUPTHER

## 2020-05-21 RX ORDER — FLUTICASONE FUROATE AND VILANTEROL TRIFENATATE 100; 25 UG/1; UG/1
POWDER RESPIRATORY (INHALATION)
Qty: 60 EACH | Refills: 3 | Status: SHIPPED | OUTPATIENT
Start: 2020-05-21 | End: 2020-08-03

## 2020-05-28 DIAGNOSIS — M51.36 LUMBAR DEGENERATIVE DISC DISEASE: ICD-10-CM

## 2020-05-28 DIAGNOSIS — J44.0 CHRONIC OBSTRUCTIVE PULMONARY DISEASE WITH LOWER RESPIRATORY INFECTION: ICD-10-CM

## 2020-05-28 DIAGNOSIS — M43.12 SPONDYLOLISTHESIS OF CERVICAL REGION: ICD-10-CM

## 2020-05-30 DIAGNOSIS — M51.36 LUMBAR DEGENERATIVE DISC DISEASE: ICD-10-CM

## 2020-05-30 DIAGNOSIS — M43.12 SPONDYLOLISTHESIS OF CERVICAL REGION: ICD-10-CM

## 2020-05-30 RX ORDER — HYDROCODONE BITARTRATE AND ACETAMINOPHEN 10; 325 MG/1; MG/1
1 TABLET ORAL EVERY 12 HOURS PRN
Qty: 60 TABLET | Refills: 0 | Status: CANCELLED | OUTPATIENT
Start: 2020-05-30

## 2020-05-30 RX ORDER — CLONAZEPAM 0.5 MG/1
0.5 TABLET ORAL 2 TIMES DAILY
Qty: 60 TABLET | Refills: 1 | Status: CANCELLED | OUTPATIENT
Start: 2020-05-30

## 2020-06-01 DIAGNOSIS — M43.12 SPONDYLOLISTHESIS OF CERVICAL REGION: ICD-10-CM

## 2020-06-01 DIAGNOSIS — M51.36 LUMBAR DEGENERATIVE DISC DISEASE: ICD-10-CM

## 2020-06-01 RX ORDER — HYDROCODONE BITARTRATE AND ACETAMINOPHEN 10; 325 MG/1; MG/1
1 TABLET ORAL EVERY 12 HOURS PRN
Qty: 60 TABLET | Refills: 0 | Status: CANCELLED | OUTPATIENT
Start: 2020-06-01

## 2020-06-01 RX ORDER — CLONAZEPAM 0.5 MG/1
0.5 TABLET ORAL 2 TIMES DAILY
Qty: 60 TABLET | Refills: 1 | Status: CANCELLED | OUTPATIENT
Start: 2020-06-01

## 2020-06-02 ENCOUNTER — TELEPHONE (OUTPATIENT)
Dept: FAMILY MEDICINE | Facility: CLINIC | Age: 70
End: 2020-06-02

## 2020-06-02 RX ORDER — HYDROCODONE BITARTRATE AND ACETAMINOPHEN 10; 325 MG/1; MG/1
1 TABLET ORAL EVERY 12 HOURS PRN
Qty: 60 TABLET | Refills: 0 | Status: SHIPPED | OUTPATIENT
Start: 2020-06-02 | End: 2020-06-30 | Stop reason: SDUPTHER

## 2020-06-02 RX ORDER — AMOXICILLIN AND CLAVULANATE POTASSIUM 875; 125 MG/1; MG/1
1 TABLET, FILM COATED ORAL 2 TIMES DAILY
Qty: 20 TABLET | Refills: 0 | OUTPATIENT
Start: 2020-06-02

## 2020-06-02 RX ORDER — CLONAZEPAM 0.5 MG/1
0.5 TABLET ORAL 2 TIMES DAILY
Qty: 60 TABLET | Refills: 1 | Status: SHIPPED | OUTPATIENT
Start: 2020-06-02 | End: 2020-07-29 | Stop reason: SDUPTHER

## 2020-06-02 NOTE — TELEPHONE ENCOUNTER
Patient notified refill request has been forwarded to Dr. Bird; awaiting MD response. Patient verbalized understanding.           ----- Message from Vale Johnson sent at 6/2/2020  9:05 AM CDT -----  Contact: Patient  Type:  Patient Returning Call    Who Called:  Patient  Does the patient know what this is regarding?:  medication  Best Call Back Number:    Additional Information:  Call to pod, no answer.

## 2020-06-25 ENCOUNTER — PES CALL (OUTPATIENT)
Dept: ADMINISTRATIVE | Facility: CLINIC | Age: 70
End: 2020-06-25

## 2020-06-28 DIAGNOSIS — I10 HYPERTENSION, UNSPECIFIED TYPE: ICD-10-CM

## 2020-06-28 DIAGNOSIS — M51.36 LUMBAR DEGENERATIVE DISC DISEASE: ICD-10-CM

## 2020-06-28 DIAGNOSIS — M43.12 SPONDYLOLISTHESIS OF CERVICAL REGION: ICD-10-CM

## 2020-06-28 DIAGNOSIS — J43.9 PULMONARY EMPHYSEMA, UNSPECIFIED EMPHYSEMA TYPE: ICD-10-CM

## 2020-06-30 DIAGNOSIS — M51.36 LUMBAR DEGENERATIVE DISC DISEASE: ICD-10-CM

## 2020-06-30 DIAGNOSIS — M43.12 SPONDYLOLISTHESIS OF CERVICAL REGION: ICD-10-CM

## 2020-06-30 RX ORDER — HYDROCODONE BITARTRATE AND ACETAMINOPHEN 10; 325 MG/1; MG/1
1 TABLET ORAL EVERY 12 HOURS PRN
Qty: 60 TABLET | Refills: 0 | Status: SHIPPED | OUTPATIENT
Start: 2020-06-30 | End: 2020-07-29 | Stop reason: SDUPTHER

## 2020-06-30 NOTE — TELEPHONE ENCOUNTER
Hydrocodone--LR--6-2-2020  LOV--3-  FOV--9-8-2020  Pain Clinic Drug Screen--3-  Pain Contract--3-  Checked [place] Prescription Monitoring Program site. No unusual or abnormal behavior noted.

## 2020-07-01 RX ORDER — HYDROCODONE BITARTRATE AND ACETAMINOPHEN 10; 325 MG/1; MG/1
1 TABLET ORAL EVERY 12 HOURS PRN
Qty: 60 TABLET | Refills: 0 | OUTPATIENT
Start: 2020-07-01

## 2020-07-01 RX ORDER — NYSTATIN AND TRIAMCINOLONE ACETONIDE 100000; 1 [USP'U]/G; MG/G
CREAM TOPICAL 3 TIMES DAILY
Qty: 30 G | Refills: 1 | Status: SHIPPED | OUTPATIENT
Start: 2020-07-01 | End: 2020-08-24 | Stop reason: SDUPTHER

## 2020-07-01 RX ORDER — LEVALBUTEROL INHALATION SOLUTION 0.63 MG/3ML
SOLUTION RESPIRATORY (INHALATION)
Qty: 792 ML | Refills: 0 | Status: SHIPPED | OUTPATIENT
Start: 2020-07-01 | End: 2020-10-28 | Stop reason: SDUPTHER

## 2020-07-14 ENCOUNTER — PES CALL (OUTPATIENT)
Dept: ADMINISTRATIVE | Facility: CLINIC | Age: 70
End: 2020-07-14

## 2020-07-22 ENCOUNTER — TELEPHONE (OUTPATIENT)
Dept: FAMILY MEDICINE | Facility: CLINIC | Age: 70
End: 2020-07-22
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
## 2020-07-22 DIAGNOSIS — D50.1 IRON DEFICIENCY ANEMIA DUE TO SIDEROPENIC DYSPHAGIA: Primary | ICD-10-CM

## 2020-07-22 DIAGNOSIS — K91.89 COMPLICATIONS OF GASTRIC BYPASS SURGERY: ICD-10-CM

## 2020-07-22 NOTE — TELEPHONE ENCOUNTER
Please see Iron and TIBC dated 3-. Results states patient needs iron infusion. Please place referral to Hematology.

## 2020-07-22 NOTE — TELEPHONE ENCOUNTER
Referral to Hematology Oncology placed by Dr. 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 Patient can be reached at [phone].

## 2020-07-22 NOTE — TELEPHONE ENCOUNTER
Received fax from [facility] requesting refill of Metformin.  Please advise.       LOV--3-   FOV-- 9-8-2020

## 2020-07-22 NOTE — TELEPHONE ENCOUNTER
----- Message from Nadege  sent at 7/22/2020  1:43 PM CDT -----  Regarding: infusion  Contact: pt  Type: Needs Medical Advice    Who Called:  pt    Best Call Back Number:     Additional Information: Requesting a call back from Nurse, regarding pt has questions about being ready to schedule infusions with  ,please call back with advice

## 2020-07-23 DIAGNOSIS — D50.9 IRON DEFICIENCY ANEMIA, UNSPECIFIED IRON DEFICIENCY ANEMIA TYPE: Primary | ICD-10-CM

## 2020-07-23 RX ORDER — METFORMIN HYDROCHLORIDE 500 MG/1
500 TABLET ORAL
Qty: 90 TABLET | Refills: 3 | Status: SHIPPED | OUTPATIENT
Start: 2020-07-23 | End: 2021-07-13

## 2020-07-24 ENCOUNTER — LAB VISIT (OUTPATIENT)
Dept: LAB | Facility: HOSPITAL | Age: 70
End: 2020-07-24
Attending: INTERNAL MEDICINE
Payer: MEDICARE

## 2020-07-24 DIAGNOSIS — D50.9 IRON DEFICIENCY ANEMIA, UNSPECIFIED IRON DEFICIENCY ANEMIA TYPE: ICD-10-CM

## 2020-07-24 LAB
ALBUMIN SERPL BCP-MCNC: 3.9 G/DL (ref 3.5–5.2)
ALP SERPL-CCNC: 117 U/L (ref 55–135)
ALT SERPL W/O P-5'-P-CCNC: 20 U/L (ref 10–44)
ANION GAP SERPL CALC-SCNC: 11 MMOL/L (ref 8–16)
AST SERPL-CCNC: 25 U/L (ref 10–40)
BASOPHILS # BLD AUTO: 0.07 K/UL (ref 0–0.2)
BASOPHILS NFR BLD: 1.1 % (ref 0–1.9)
BILIRUB SERPL-MCNC: 0.3 MG/DL (ref 0.1–1)
BUN SERPL-MCNC: 17 MG/DL (ref 8–23)
CALCIUM SERPL-MCNC: 9.4 MG/DL (ref 8.7–10.5)
CHLORIDE SERPL-SCNC: 105 MMOL/L (ref 95–110)
CO2 SERPL-SCNC: 24 MMOL/L (ref 23–29)
CREAT SERPL-MCNC: 0.6 MG/DL (ref 0.5–1.4)
DIFFERENTIAL METHOD: ABNORMAL
EOSINOPHIL # BLD AUTO: 0.4 K/UL (ref 0–0.5)
EOSINOPHIL NFR BLD: 6 % (ref 0–8)
ERYTHROCYTE [DISTWIDTH] IN BLOOD BY AUTOMATED COUNT: 16.5 % (ref 11.5–14.5)
EST. GFR  (AFRICAN AMERICAN): >60 ML/MIN/1.73 M^2
EST. GFR  (NON AFRICAN AMERICAN): >60 ML/MIN/1.73 M^2
FERRITIN SERPL-MCNC: 18 NG/ML (ref 20–300)
GLUCOSE SERPL-MCNC: 64 MG/DL (ref 70–110)
HCT VFR BLD AUTO: 46.6 % (ref 37–48.5)
HGB BLD-MCNC: 12.8 G/DL (ref 12–16)
IMM GRANULOCYTES # BLD AUTO: 0.01 K/UL (ref 0–0.04)
IMM GRANULOCYTES NFR BLD AUTO: 0.2 % (ref 0–0.5)
LYMPHOCYTES # BLD AUTO: 2.1 K/UL (ref 1–4.8)
LYMPHOCYTES NFR BLD: 31.1 % (ref 18–48)
MCH RBC QN AUTO: 23.6 PG (ref 27–31)
MCHC RBC AUTO-ENTMCNC: 27.5 G/DL (ref 32–36)
MCV RBC AUTO: 86 FL (ref 82–98)
MONOCYTES # BLD AUTO: 0.9 K/UL (ref 0.3–1)
MONOCYTES NFR BLD: 13.4 % (ref 4–15)
NEUTROPHILS # BLD AUTO: 3.2 K/UL (ref 1.8–7.7)
NEUTROPHILS NFR BLD: 48.2 % (ref 38–73)
NRBC BLD-RTO: 0 /100 WBC
PLATELET # BLD AUTO: 210 K/UL (ref 150–350)
PMV BLD AUTO: 10.6 FL (ref 9.2–12.9)
POTASSIUM SERPL-SCNC: 4.6 MMOL/L (ref 3.5–5.1)
PROT SERPL-MCNC: 7.5 G/DL (ref 6–8.4)
RBC # BLD AUTO: 5.42 M/UL (ref 4–5.4)
SODIUM SERPL-SCNC: 140 MMOL/L (ref 136–145)
WBC # BLD AUTO: 6.63 K/UL (ref 3.9–12.7)

## 2020-07-24 PROCEDURE — 82728 ASSAY OF FERRITIN: CPT | Mod: HCNC

## 2020-07-24 PROCEDURE — 85025 COMPLETE CBC W/AUTO DIFF WBC: CPT | Mod: HCNC

## 2020-07-24 PROCEDURE — 36415 COLL VENOUS BLD VENIPUNCTURE: CPT | Mod: HCNC

## 2020-07-24 PROCEDURE — 80053 COMPREHEN METABOLIC PANEL: CPT | Mod: HCNC

## 2020-07-24 PROCEDURE — 83540 ASSAY OF IRON: CPT | Mod: HCNC

## 2020-07-25 LAB
IRON SERPL-MCNC: 49 UG/DL (ref 30–160)
SATURATED IRON: 9 % (ref 20–50)
TOTAL IRON BINDING CAPACITY: 539 UG/DL (ref 250–450)
TRANSFERRIN SERPL-MCNC: 364 MG/DL (ref 200–375)

## 2020-07-27 ENCOUNTER — TELEPHONE (OUTPATIENT)
Dept: HEMATOLOGY/ONCOLOGY | Facility: CLINIC | Age: 70
End: 2020-07-27

## 2020-07-28 ENCOUNTER — OFFICE VISIT (OUTPATIENT)
Dept: HEMATOLOGY/ONCOLOGY | Facility: CLINIC | Age: 70
End: 2020-07-28
Payer: MEDICARE

## 2020-07-28 VITALS
RESPIRATION RATE: 16 BRPM | HEART RATE: 67 BPM | BODY MASS INDEX: 32.77 KG/M2 | WEIGHT: 216.25 LBS | SYSTOLIC BLOOD PRESSURE: 137 MMHG | HEIGHT: 68 IN | DIASTOLIC BLOOD PRESSURE: 63 MMHG
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
## 2020-07-28 DIAGNOSIS — R16.0 HEPATOMEGALY: ICD-10-CM

## 2020-07-28 DIAGNOSIS — Z79.01 LONG TERM CURRENT USE OF ANTICOAGULANT THERAPY: ICD-10-CM

## 2020-07-28 DIAGNOSIS — R53.83 FATIGUE, UNSPECIFIED TYPE: Primary | ICD-10-CM

## 2020-07-28 DIAGNOSIS — Z72.3 INACTIVITY: ICD-10-CM

## 2020-07-28 DIAGNOSIS — E11.40 TYPE 2 DIABETES MELLITUS WITH DIABETIC NEUROPATHY, WITHOUT LONG-TERM CURRENT USE OF INSULIN: ICD-10-CM

## 2020-07-28 DIAGNOSIS — D50.1 IRON DEFICIENCY ANEMIA DUE TO SIDEROPENIC DYSPHAGIA: ICD-10-CM

## 2020-07-28 DIAGNOSIS — G47.429 NARCOLEPSY DUE TO UNDERLYING CONDITION WITHOUT CATAPLEXY: ICD-10-CM

## 2020-07-28 DIAGNOSIS — K91.89 COMPLICATIONS OF GASTRIC BYPASS SURGERY: ICD-10-CM

## 2020-07-28 PROCEDURE — 3075F PR MOST RECENT SYSTOLIC BLOOD PRESS GE 130-139MM HG: ICD-10-PCS | Mod: HCNC,CPTII,S$GLB, | Performed by: INTERNAL MEDICINE

## 2020-07-28 PROCEDURE — 1125F PR PAIN SEVERITY QUANTIFIED, PAIN PRESENT: ICD-10-PCS | Mod: HCNC,S$GLB,, | Performed by: INTERNAL MEDICINE

## 2020-07-28 PROCEDURE — 3008F PR BODY MASS INDEX (BMI) DOCUMENTED: ICD-10-PCS | Mod: HCNC,CPTII,S$GLB, | Performed by: INTERNAL MEDICINE

## 2020-07-28 PROCEDURE — 99215 PR OFFICE/OUTPT VISIT, EST, LEVL V, 40-54 MIN: ICD-10-PCS | Mod: HCNC,S$GLB,, | Performed by: INTERNAL MEDICINE

## 2020-07-28 PROCEDURE — 3078F DIAST BP <80 MM HG: CPT | Mod: HCNC,CPTII,S$GLB, | Performed by: INTERNAL MEDICINE

## 2020-07-28 PROCEDURE — 1125F AMNT PAIN NOTED PAIN PRSNT: CPT | Mod: HCNC,S$GLB,, | Performed by: INTERNAL MEDICINE

## 2020-07-28 PROCEDURE — 3075F SYST BP GE 130 - 139MM HG: CPT | Mod: HCNC,CPTII,S$GLB, | Performed by: INTERNAL MEDICINE

## 2020-07-28 PROCEDURE — 2024F PR 7 FIELD PHOTOS WITH INTERP/ REVIEW: ICD-10-PCS | Mod: HCNC,S$GLB,, | Performed by: INTERNAL MEDICINE

## 2020-07-28 PROCEDURE — 3044F HG A1C LEVEL LT 7.0%: CPT | Mod: HCNC,CPTII,S$GLB, | Performed by: INTERNAL MEDICINE

## 2020-07-28 PROCEDURE — 3044F PR MOST RECENT HEMOGLOBIN A1C LEVEL <7.0%: ICD-10-PCS | Mod: HCNC,CPTII,S$GLB, | Performed by: INTERNAL MEDICINE

## 2020-07-28 PROCEDURE — 1101F PR PT FALLS ASSESS DOC 0-1 FALLS W/OUT INJ PAST YR: ICD-10-PCS | Mod: HCNC,CPTII,S$GLB, | Performed by: INTERNAL MEDICINE

## 2020-07-28 PROCEDURE — 99999 PR PBB SHADOW E&M-EST. PATIENT-LVL V: ICD-10-PCS | Mod: PBBFAC,HCNC,, | Performed by: INTERNAL MEDICINE

## 2020-07-28 PROCEDURE — 3008F BODY MASS INDEX DOCD: CPT | Mod: HCNC,CPTII,S$GLB, | Performed by: INTERNAL MEDICINE

## 2020-07-28 PROCEDURE — 1159F PR MEDICATION LIST DOCUMENTED IN MEDICAL RECORD: ICD-10-PCS | Mod: HCNC,S$GLB,, | Performed by: INTERNAL MEDICINE

## 2020-07-28 PROCEDURE — 3288F FALL RISK ASSESSMENT DOCD: CPT | Mod: HCNC,CPTII,S$GLB, | Performed by: INTERNAL MEDICINE

## 2020-07-28 PROCEDURE — 1101F PT FALLS ASSESS-DOCD LE1/YR: CPT | Mod: HCNC,CPTII,S$GLB, | Performed by: INTERNAL MEDICINE

## 2020-07-28 PROCEDURE — 99999 PR PBB SHADOW E&M-EST. PATIENT-LVL V: CPT | Mod: PBBFAC,HCNC,, | Performed by: INTERNAL MEDICINE

## 2020-07-28 PROCEDURE — 3288F PR FALLS RISK ASSESSMENT DOCUMENTED: ICD-10-PCS | Mod: HCNC,CPTII,S$GLB, | Performed by: INTERNAL MEDICINE

## 2020-07-28 PROCEDURE — 2024F 7 FLD RTA PHOTO EVC RTNOPTHY: CPT | Mod: HCNC,S$GLB,, | Performed by: INTERNAL MEDICINE

## 2020-07-28 PROCEDURE — 3078F PR MOST RECENT DIASTOLIC BLOOD PRESSURE < 80 MM HG: ICD-10-PCS | Mod: HCNC,CPTII,S$GLB, | Performed by: INTERNAL MEDICINE

## 2020-07-28 PROCEDURE — 99215 OFFICE O/P EST HI 40 MIN: CPT | Mod: HCNC,S$GLB,, | Performed by: INTERNAL MEDICINE

## 2020-07-28 PROCEDURE — 1159F MED LIST DOCD IN RCRD: CPT | Mod: HCNC,S$GLB,, | Performed by: INTERNAL MEDICINE

## 2020-07-28 RX ORDER — SODIUM CHLORIDE 9 MG/ML
INJECTION, SOLUTION INTRAVENOUS CONTINUOUS
Status: CANCELLED | OUTPATIENT
Start: 2020-07-28

## 2020-07-28 RX ORDER — SODIUM CHLORIDE 0.9 % (FLUSH) 0.9 %
10 SYRINGE (ML) INJECTION
Status: CANCELLED | OUTPATIENT
Start: 2020-07-28

## 2020-07-28 RX ORDER — SODIUM CHLORIDE 0.9 % (FLUSH) 0.9 %
10 SYRINGE (ML) INJECTION
Status: CANCELLED | OUTPATIENT
Start: 2020-08-05

## 2020-07-28 RX ORDER — HEPARIN 100 UNIT/ML
500 SYRINGE INTRAVENOUS
Status: CANCELLED | OUTPATIENT
Start: 2020-08-05

## 2020-07-28 RX ORDER — HEPARIN 100 UNIT/ML
5 SYRINGE INTRAVENOUS
Status: CANCELLED | OUTPATIENT
Start: 2020-07-28

## 2020-07-28 RX ORDER — HEPARIN 100 UNIT/ML
500 SYRINGE INTRAVENOUS
Status: CANCELLED | OUTPATIENT
Start: 2020-07-28

## 2020-07-28 NOTE — LETTER
July 28, 2020      Constantine Bird MD  [address]  [place] [address]           [facility]  [address]  [place] [address]  Phone: [phone]          Patient: Nelly Tian   MR Number: [id number]   YOB: 1950   Date of Visit: 7/28/2020       Dear Dr. Constantine Bird:    Thank you for referring Nelly Tian to me for evaluation. Attached you will find relevant portions of my assessment and plan of care.    If you have questions, please do not hesitate to call me. I look forward to following Nelly Tian along with you.    Sincerely,    Laura Ramos MD    Enclosure  CC:  No Recipients    If you would like to receive this communication electronically, please contact [email or web address] or [phone] to request more information on [employer] access.    For providers and/or their staff who would like to refer a patient to [facility], please contact us through our one-stop-shop provider referral line, [facility] , at [phone].    If you feel you have received this communication in error or would no longer like to receive these types of communications, please e-mail [email or web address]

## 2020-07-28 NOTE — PROGRESS NOTES
CHIEF COMPLAINT:   I cannot stay awake and can barely breathe when I walk     Ms. Tian is a 68 -year-old female who has a history of progressive anemia after  IV iron.    Here today for routine labs for anemia. Post IV iron in the past   She is tolerating Glucophage for diabetes as well as Lexapro for depression and Zestril  for hypertension  Trazodone not helping her sleep  Scared to take OTC medicine on eliquis   She is scheduled to have gastric bypass soon   Pt was requiring iron intervention and postponed this due to covid 19   She is exhausted and symptomatic   See ROS  Past Medical History:   Diagnosis Date    *Atrial flutter     Angina pectoris 9/18/2017    Anxiety     Arthritis     Asthma     Atrial fibrillation     Back pain     Cataract     OD    CHF (congestive heart failure)     COPD (chronic obstructive pulmonary disease)     Depression     Diabetes mellitus     Emphysema of lung     Heart failure     Hepatomegaly 2/3/2016    Hernia     History of MI (myocardial infarction) 1/19/2016    Hypercapnic respiratory failure, chronic 11/16/2016    Hyperlipidemia     Hypertension     Iron deficiency anemia 2/3/2016    Myocardial infarction     Obesity     Peripheral vascular disease     Pneumonia     Polyneuropathy     Retinal detachment     OS    Septic shock 4/23/2017    Skin ulcer 3/18/2017    Tobacco dependence     Type II 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, not stated as uncontrolled(250.60)    tolerating lipitor for dysipidemia and neurontin for paresthesias      Current Outpatient Medications:     albuterol (ACCUNEB) 0.63 mg/3 mL Nebu, Take 3 mLs (0.63 mg total) by nebulization every 6 (six) hours as needed. Rescue, Disp: 75 mL, Rfl: 11    albuterol (PROVENTIL/VENTOLIN HFA) 90 mcg/actuation inhaler, INHALE 2 PUFFS INTO THE LUNGS EVERY 6 HOURS AS NEEDED FOR RESCUE, Disp: 54 g, Rfl: 3    amoxicillin-clavulanate 875-125mg (AUGMENTIN)  875-125 mg per tablet, Take 1 tablet by mouth 2 (two) times daily., Disp: 20 tablet, Rfl: 0    apixaban (ELIQUIS) 5 mg Tab, Take 1 tablet (5 mg total) by mouth 2 (two) times daily., Disp: 180 tablet, Rfl: 0    ascorbic acid, vitamin C, (VITAMIN C) 500 MG tablet, Take 2 tablets (1,000 mg total) by mouth every evening., Disp: , Rfl:     atorvastatin (LIPITOR) 20 MG tablet, Take 1 tablet (20 mg total) by mouth once daily., Disp: 90 tablet, Rfl: 3    blood-glucose meter (TRUE METRIX AIR GLUCOSE METER) kit, AC meals. Insulin dependent.Dispence strips and lancets 3 months., Disp: 1 each, Rfl: 0    BREO ELLIPTA 100-25 mcg/dose diskus inhaler, INHALE 1 PUFF INTO THE LUNGS ONE TIME DAILY., Disp: 60 each, Rfl: 3    clonazePAM (KLONOPIN) 0.5 MG tablet, Take 1 tablet (0.5 mg total) by mouth 2 (two) times daily. TAKE ONE TABLET BY MOUTH TWO TIMES A DAY AS NEEDED. Medically necessary, Disp: 60 tablet, Rfl: 1    doxycycline (VIBRA-TABS) 100 MG tablet, Take 1 tablet (100 mg total) by mouth 2 (two) times daily., Disp: 20 tablet, Rfl: 1    ferrous sulfate (FEOSOL) 325 mg (65 mg iron) Tab tablet, Take 1 tablet (325 mg total) by mouth once daily., Disp: 60 tablet, Rfl: 3    fluticasone propionate (FLONASE) 50 mcg/actuation nasal spray, 1 spray (50 mcg total) by Each Nostril route once daily., Disp: 1 g, Rfl: 3    furosemide (LASIX) 40 MG tablet, Take 0.5 tablets (20 mg total) by mouth 3 (three) times a week., Disp: 6 tablet, Rfl: 2    gabapentin (NEURONTIN) 800 MG tablet, TAKE ONE TABLET BY MOUTH THREE TIMES A DAY, Disp: 270 tablet, Rfl: 3    HYDROcodone-acetaminophen (NORCO)  mg per tablet, Take 1 tablet by mouth every 12 (twelve) hours as needed for Pain. Medical necessary, Disp: 60 tablet, Rfl: 0    lancets Misc, To check BG 3 times daily, to use with insurance preferred meter., Disp: 300 each, Rfl: 11    levalbuterol (XOPENEX) 0.63 mg/3 mL nebulizer solution, INHALE THE CONTENTS OF 1 VIAL BY NEBULIZATION 4 times   DAILY.    DX: J43.9, Disp: 792 mL, Rfl: 0    lisinopriL (PRINIVIL,ZESTRIL) 20 MG tablet, Take 1 tablet (20 mg total) by mouth 2 (two) times daily., Disp: 90 tablet, Rfl: 11    loratadine (CLARITIN) 10 mg tablet, Take 1 tablet (10 mg total) by mouth once daily., Disp: , Rfl: 0    metFORMIN (GLUCOPHAGE) 500 MG tablet, Take 1 tablet (500 mg total) by mouth daily with breakfast., Disp: 90 tablet, Rfl: 3    multivitamin (THERAGRAN) tablet, Take 1 tablet by mouth once daily., Disp: , Rfl:     nystatin (NYSTOP) powder, Apply topically 2 (two) times daily., Disp: 120 g, Rfl: 11    nystatin-triamcinolone (MYCOLOG II) cream, Apply topically 3 (three) times daily., Disp: 30 g, Rfl: 1    omega-3 acid ethyl esters (LOVAZA) 1 gram capsule, 2 (two) times daily. , Disp: , Rfl:     omeprazole (PRILOSEC) 20 MG capsule, TAKE ONE CAPSULE BY MOUTH EVERY DAY (Patient not taking: Reported on 3/10/2020), Disp: 30 capsule, Rfl: 11    omeprazole (PRILOSEC) 20 MG capsule, Take 1 capsule (20 mg total) by mouth 2 (two) times daily before meals., Disp: 180 capsule, Rfl: 3    ondansetron (ZOFRAN-ODT) 8 MG TbDL, DISSOLVE 1 TABLET ON THE TONGUE EVERY 8 HOURS, Disp: 270 tablet, Rfl: 3    polyethylene glycol (GLYCOLAX) 17 gram/dose powder, , Disp: , Rfl:     potassium chloride SA (K-DUR,KLOR-CON) 20 MEQ tablet, Take 1 tablet (20 mEq total) by mouth once daily., Disp: 30 tablet, Rfl: 11    senna-docusate 8.6-50 mg (PERICOLACE) 8.6-50 mg per tablet, Take 1 tablet by mouth 2 (two) times daily as needed for Constipation., Disp: , Rfl:     traZODone (DESYREL) 50 MG tablet, Take 1.5 tablets (75 mg total) by mouth every evening., Disp: 135 tablet, Rfl: 3    TRUE METRIX GLUCOSE TEST STRIP Strp, TEST BLOOD SUGAR THREE TIMES A DAY , Disp: 300 strip, Rfl: 3  No current facility-administered medications for this visit.     Facility-Administered Medications Ordered in Other Visits:     sodium hyaluronate (orthovisc) Syrg 15 mg, 15 mg, , , Mehran  "LUIS A Carrasco MD, 15 mg at 02/07/20 1000    sodium hyaluronate (orthovisc) Syrg 15 mg, 15 mg, , , Mehran Carrasco MD, 15 mg at 02/07/20 1000    Tolerating zofran for nausea and glucophage for DM     REVIEW OF SYSTEMS: extreme exhaustion    GENERAL:  No progression of fatigue nor SOB  She is obese.  Walking with walker,   HEENT:  No photophobia, No rhinorrhea   RESPIRATORY:   positive congestion int  no colored discharge  No wheezing   CARDIOVASCULAR no   chest pain, NO  palpitations  GASTROINTESTINAL:  No abdominal pain, dysphagia, emesis, diarrhea, or  constipation.    GENITOURINARY:  No urinary frequency, hesitancy  RHEUM:  no arthralgias or joint swelling.  MUSCOLSKELETAL:  No neck pain, back pain, positive  arthralgias  NEUROLOGICAL:  No headaches, positive dizziness, + paresthesias  PSYCH:  No agitation, change in behavior, or anxiety.  ENDOCRINE:  No hot or cold intolerance.      PHYSICAL EXAMINATION:    /63   Pulse 67   Resp 16   Ht 5' 8" (1.727 m)   Wt 98.1 kg (216 lb 4.3 oz)   LMP  (LMP Unknown)   BMI 32.88 kg/m²     GENERAL:  She is obese.  pale and exhausted   PSYCH:  Flat  affect.  No anxiety or depression.  HEENT:  Normocephalic.  Lids intact, conjunctivae pale     OP clear,  No palatal pallor.  NECK:  Supple.  Trachea midline.  No palpable abnormalities.  CHEST: CLEAR  BS, no  Fremitus dull to percussion   CV S1S2 with RRR no loud S1 or S2  No PMI   ABDOMEN:  NT, ND.  Normal bowel sounds.  No palpable HSM or mass.  EXTREMITIES:  Legs,  2 +  pitting edema.bilaterally unchanged  NEUROLOGICAL:  Patient is ambulating well with walker    SKIN:  Warm, dry.  Ecchymosis evident.  No tenting, petechiae  2mo ago  Iron30 - 160 ug/dL27 Abnormally low  33 Xlhyhlsqubn683 - 375 mg/dL294 301 XFSN067 - 450 ug/dL435 445 Saturated Iron20 - 50 %6 Abnormally low  7 Abnormally low   Gixun8iz ago  IgG 1382 - 929 mg/dL605 IgG 2242 - 700 mg/gK525Wfn  IgG 322 - 176 mg/dL76 IgG 44 - 86 mg/dL72 Comment: Test " performed at [facility]     Lab Results   Component Value Date    WBC 6.63 07/24/2020    HGB 12.8 07/24/2020    HCT 46.6 07/24/2020    MCV 86 07/24/2020     07/24/2020                  Ferritin 20.0 - 300.0 ng/mL 18Low   20  20  29  54  16Low   351       CMP  Sodium   Date Value Ref Range Status   07/24/2020 140 136 - 145 mmol/L Final     Potassium   Date Value Ref Range Status   07/24/2020 4.6 3.5 - 5.1 mmol/L Final     Chloride   Date Value Ref Range Status   07/24/2020 105 95 - 110 mmol/L Final     CO2   Date Value Ref Range Status   07/24/2020 24 23 - 29 mmol/L Final     Glucose   Date Value Ref Range Status   07/24/2020 64 (L) 70 - 110 mg/dL Final     BUN, Bld   Date Value Ref Range Status   07/24/2020 17 8 - 23 mg/dL Final     Creatinine   Date Value Ref Range Status   07/24/2020 0.6 0.5 - 1.4 mg/dL Final     Calcium   Date Value Ref Range Status   07/24/2020 9.4 8.7 - 10.5 mg/dL Final     Total Protein   Date Value Ref Range Status   07/24/2020 7.5 6.0 - 8.4 g/dL Final     Albumin   Date Value Ref Range Status   07/24/2020 3.9 3.5 - 5.2 g/dL Final     Total Bilirubin   Date Value Ref Range Status   07/24/2020 0.3 0.1 - 1.0 mg/dL Final     Comment:     For infants and newborns, interpretation of results should be based  on gestational age, weight and in agreement with clinical  observations.  Premature Infant recommended reference ranges:  Up to 24 hours.............<8.0 mg/dL  Up to 48 hours............<12.0 mg/dL  3-5 days..................<15.0 mg/dL  6-29 days.................<15.0 mg/dL       Alkaline Phosphatase   Date Value Ref Range Status   07/24/2020 117 55 - 135 U/L Final     AST   Date Value Ref Range Status   07/24/2020 25 10 - 40 U/L Final     ALT   Date Value Ref Range Status   07/24/2020 20 10 - 44 U/L Final     Anion Gap   Date Value Ref Range Status   07/24/2020 11 8 - 16 mmol/L Final     eGFR if    Date Value Ref Range Status   07/24/2020 >60 >60 mL/min/1.73 m^2 Final      eGFR if non    Date Value Ref Range Status   07/24/2020 >60 >60 mL/min/1.73 m^2 Final     Comment:     Calculation used to obtain the estimated glomerular filtration  rate (eGFR) is the CKD-EPI equation.             Ref Range & Units 4d ago   Iron 30 - 160 ug/dL 52    Transferrin 200 - 375 mg/dL 257    TIBC 250 - 450 ug/dL 380    Saturated Iron 20 - 50 % 14Low        Fatigue, unspecified type    Iron deficiency anemia due to sideropenic dysphagia    Complications of gastric bypass surgery    Hypoglycemia     Narcolepsy due to underlying condition without cataplexy    Inactivity    Hepatomegaly    Type 2 diabetes mellitus with diabetic neuropathy, without long-term current use of insulin    Long term current use of anticoagulant therapy    symptomatic anemia with severely low ferritin and iron saturation  paroxysmal A fib : she is stable   Educated her on the dangers of low blood sugars !!   For gastric bypass with Dr Lucero on hold for now   SHe does have low IGG2 hence explained if she ever develops an infection that does not respond to the antibiotics she may need IVIG   Cont lipitor to prevent plaques monitored by pcp  Cont lasix to help with edema for BP monitored by pcp  She is to continue eliquis for chronic atrial fibrillation as well as her diabetic medication to help control hyperglycemia due to diabetes  RTC 5- 6  weeks with cbc  Fatigue mixed etiology   If I can get her iron to a decent level she can start exercising and this will help with her fatigue

## 2020-07-29 DIAGNOSIS — I10 HYPERTENSION, UNSPECIFIED TYPE: ICD-10-CM

## 2020-07-29 DIAGNOSIS — E11.610 DIABETIC NEUROGENIC ARTHROPATHY: ICD-10-CM

## 2020-07-29 DIAGNOSIS — M51.36 LUMBAR DEGENERATIVE DISC DISEASE: ICD-10-CM

## 2020-07-29 DIAGNOSIS — I15.2 HYPERTENSION ASSOCIATED WITH DIABETES: ICD-10-CM

## 2020-07-29 DIAGNOSIS — E11.59 HYPERTENSION ASSOCIATED WITH DIABETES: ICD-10-CM

## 2020-07-29 DIAGNOSIS — J41.8 MIXED SIMPLE AND MUCOPURULENT CHRONIC BRONCHITIS: ICD-10-CM

## 2020-07-29 DIAGNOSIS — M43.12 SPONDYLOLISTHESIS OF CERVICAL REGION: ICD-10-CM

## 2020-07-29 DIAGNOSIS — I50.22 CHRONIC SYSTOLIC CONGESTIVE HEART FAILURE: ICD-10-CM

## 2020-07-29 RX ORDER — POTASSIUM CHLORIDE 20 MEQ/1
20 TABLET, EXTENDED RELEASE ORAL DAILY
Qty: 30 TABLET | Refills: 11 | Status: CANCELLED | OUTPATIENT
Start: 2020-07-29

## 2020-07-29 RX ORDER — LISINOPRIL 20 MG/1
20 TABLET ORAL 2 TIMES DAILY
Qty: 90 TABLET | Refills: 11 | Status: CANCELLED | OUTPATIENT
Start: 2020-07-29

## 2020-07-29 RX ORDER — FLUTICASONE FUROATE AND VILANTEROL TRIFENATATE 100; 25 UG/1; UG/1
POWDER RESPIRATORY (INHALATION)
Qty: 60 EACH | Refills: 3 | Status: CANCELLED | OUTPATIENT
Start: 2020-07-29

## 2020-07-29 RX ORDER — GABAPENTIN 800 MG/1
800 TABLET ORAL 3 TIMES DAILY
Qty: 270 TABLET | Refills: 3 | Status: SHIPPED | OUTPATIENT
Start: 2020-07-29 | End: 2020-10-14 | Stop reason: SDUPTHER

## 2020-07-29 RX ORDER — ATORVASTATIN CALCIUM 20 MG/1
20 TABLET, FILM COATED ORAL DAILY
Qty: 90 TABLET | Refills: 3 | Status: CANCELLED | OUTPATIENT
Start: 2020-07-29

## 2020-07-29 NOTE — TELEPHONE ENCOUNTER
Prescription refill request.   LOV: 03/10/2020  NOV: 07/31/2020  LDR: 0103/2020  Last Labs: 07/24/2020

## 2020-07-29 NOTE — TELEPHONE ENCOUNTER
LOV--3-  FOV--7-  [facility] Drug Screen--3-  Pain Contract--3-  Checked [place] Prescription Monitoring Program site. No unusual or abnormal behavior noted.    Family Drug Chemult

## 2020-07-30 ENCOUNTER — TELEPHONE (OUTPATIENT)
Dept: INFUSION THERAPY | Facility: HOSPITAL | Age: 70
End: 2020-07-30

## 2020-07-31 ENCOUNTER — OFFICE VISIT (OUTPATIENT)
Dept: FAMILY MEDICINE | Facility: CLINIC | Age: 70
End: 2020-07-31
Payer: MEDICARE

## 2020-07-31 VITALS
TEMPERATURE: 97 F | SYSTOLIC BLOOD PRESSURE: 120 MMHG | HEART RATE: 65 BPM | DIASTOLIC BLOOD PRESSURE: 60 MMHG | BODY MASS INDEX: 33.91 KG/M2 | HEIGHT: 68 IN | WEIGHT: 223.75 LBS

## 2020-07-31 DIAGNOSIS — M43.12 SPONDYLOLISTHESIS OF CERVICAL REGION: ICD-10-CM

## 2020-07-31 DIAGNOSIS — M51.36 LUMBAR DEGENERATIVE DISC DISEASE: Primary | ICD-10-CM

## 2020-07-31 DIAGNOSIS — I50.22 CHRONIC SYSTOLIC CONGESTIVE HEART FAILURE: ICD-10-CM

## 2020-07-31 PROCEDURE — 1101F PT FALLS ASSESS-DOCD LE1/YR: CPT | Mod: HCNC,CPTII,S$GLB, | Performed by: NURSE PRACTITIONER

## 2020-07-31 PROCEDURE — 3074F SYST BP LT 130 MM HG: CPT | Mod: HCNC,CPTII,S$GLB, | Performed by: NURSE PRACTITIONER

## 2020-07-31 PROCEDURE — 99999 PR PBB SHADOW E&M-EST. PATIENT-LVL V: ICD-10-PCS | Mod: PBBFAC,HCNC,, | Performed by: NURSE PRACTITIONER

## 2020-07-31 PROCEDURE — 1159F PR MEDICATION LIST DOCUMENTED IN MEDICAL RECORD: ICD-10-PCS | Mod: HCNC,S$GLB,, | Performed by: NURSE PRACTITIONER

## 2020-07-31 PROCEDURE — 1125F PR PAIN SEVERITY QUANTIFIED, PAIN PRESENT: ICD-10-PCS | Mod: HCNC,S$GLB,, | Performed by: NURSE PRACTITIONER

## 2020-07-31 PROCEDURE — 1101F PR PT FALLS ASSESS DOC 0-1 FALLS W/OUT INJ PAST YR: ICD-10-PCS | Mod: HCNC,CPTII,S$GLB, | Performed by: NURSE PRACTITIONER

## 2020-07-31 PROCEDURE — 99213 OFFICE O/P EST LOW 20 MIN: CPT | Mod: HCNC,S$GLB,, | Performed by: NURSE PRACTITIONER

## 2020-07-31 PROCEDURE — 3008F PR BODY MASS INDEX (BMI) DOCUMENTED: ICD-10-PCS | Mod: HCNC,CPTII,S$GLB, | Performed by: NURSE PRACTITIONER

## 2020-07-31 PROCEDURE — 3008F BODY MASS INDEX DOCD: CPT | Mod: HCNC,CPTII,S$GLB, | Performed by: NURSE PRACTITIONER

## 2020-07-31 PROCEDURE — 3078F PR MOST RECENT DIASTOLIC BLOOD PRESSURE < 80 MM HG: ICD-10-PCS | Mod: HCNC,CPTII,S$GLB, | Performed by: NURSE PRACTITIONER

## 2020-07-31 PROCEDURE — 1125F AMNT PAIN NOTED PAIN PRSNT: CPT | Mod: HCNC,S$GLB,, | Performed by: NURSE PRACTITIONER

## 2020-07-31 PROCEDURE — 99213 PR OFFICE/OUTPT VISIT, EST, LEVL III, 20-29 MIN: ICD-10-PCS | Mod: HCNC,S$GLB,, | Performed by: NURSE PRACTITIONER

## 2020-07-31 PROCEDURE — 1159F MED LIST DOCD IN RCRD: CPT | Mod: HCNC,S$GLB,, | Performed by: NURSE PRACTITIONER

## 2020-07-31 PROCEDURE — 99999 PR PBB SHADOW E&M-EST. PATIENT-LVL V: CPT | Mod: PBBFAC,HCNC,, | Performed by: NURSE PRACTITIONER

## 2020-07-31 PROCEDURE — 3078F DIAST BP <80 MM HG: CPT | Mod: HCNC,CPTII,S$GLB, | Performed by: NURSE PRACTITIONER

## 2020-07-31 PROCEDURE — 3074F PR MOST RECENT SYSTOLIC BLOOD PRESSURE < 130 MM HG: ICD-10-PCS | Mod: HCNC,CPTII,S$GLB, | Performed by: NURSE PRACTITIONER

## 2020-07-31 RX ORDER — POTASSIUM CHLORIDE 20 MEQ/1
20 TABLET, EXTENDED RELEASE ORAL DAILY
Qty: 30 TABLET | Refills: 11 | Status: SHIPPED | OUTPATIENT
Start: 2020-07-31 | End: 2020-08-24 | Stop reason: SDUPTHER

## 2020-07-31 NOTE — PROGRESS NOTES
This dictation has been generated using Modal Fluency Dictation some phonetic errors may occur. Please contact author for clarification if needed.     Problem List Items Addressed This Visit     None      Visit Diagnoses     Lumbar degenerative disc disease    -  Primary    Spondylolisthesis of cervical region        Chronic systolic congestive heart failure              Orders Placed This Encounter    potassium chloride SA (K-DUR,KLOR-CON) 20 MEQ tablet     Lumbar disc disease and spondylolisthesis cervical region.  Med request pending with primary care.    Follow up in about 3 months (around 10/31/2020).    ________________________________________________________________  ________________________________________________________________      Chief Complaint   Patient presents with    Medication Refill     History of present illness  This 69 y.o. presents today for complaint of back pain and med refill.  Needs refill of potassium.  Does have lumbar disc disease and spondylolisthesis of cervical region.   reviewed notes appropriate med use.  No early refills.  She does have CHF utilization of Lasix and potassium.  No other complaints.  Limited review of systems negative  Past medical social history reviewed  Past Medical History:   Diagnosis Date    *Atrial flutter     Angina pectoris 9/18/2017    Anxiety     Arthritis     Asthma     Atrial fibrillation     Back pain     Cataract     OD    CHF (congestive heart failure)     COPD (chronic obstructive pulmonary disease)     Depression     Diabetes mellitus     Emphysema of lung     Heart failure     Hepatomegaly 2/3/2016    Hernia     History of MI (myocardial infarction) 1/19/2016    Hypercapnic respiratory failure, chronic 11/16/2016    Hyperlipidemia     Hypertension     Iron deficiency anemia 2/3/2016    Myocardial infarction     Obesity     Peripheral vascular disease     Pneumonia     Polyneuropathy     Retinal detachment     OS     Septic shock 2017    Skin ulcer 3/18/2017    Tobacco dependence     Type II 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, not stated as uncontrolled(250.60)        Past Surgical History:   Procedure Laterality Date    BREAST BIOPSY Left 10 yrs ago    benign    CATARACT EXTRACTION Left     OS      SECTION      x2    EYE SURGERY      HYSTERECTOMY      partial    OOPHORECTOMY      one ovary conserved    RETINAL DETACHMENT SURGERY      buckle --OS    TONSILLECTOMY         Family History   Problem Relation Age of Onset    Arthritis Father     Heart disease Father     Early death Sister 30        heart disease    Heart disease Sister 32        MI    Cancer Brother         Lung cancer    No Known Problems Daughter     Blindness Son         due to accident//    Arthritis Sister     Heart disease Sister     Crohn's disease Sister     Alcohol abuse Mother     Breast cancer Neg Hx     Glaucoma Neg Hx     Macular degeneration Neg Hx     Retinal detachment Neg Hx     Amblyopia Neg Hx     Diabetes Neg Hx     Cataracts Neg Hx     Hypertension Neg Hx     Strabismus Neg Hx     Stroke Neg Hx     Thyroid disease Neg Hx        Social History     Socioeconomic History    Marital status:      Spouse name: Not on file    Number of children: Not on file    Years of education: Not on file    Highest education level: Not on file   Occupational History    Not on file   Social Needs    Financial resource strain: Not on file    Food insecurity     Worry: Not on file     Inability: Not on file    Transportation needs     Medical: Not on file     Non-medical: Not on file   Tobacco Use    Smoking status: Current Every Day Smoker     Packs/day: 0.25     Years: 50.00     Pack years: 12.50     Types: Cigarettes     Start date: 1968     Last attempt to quit: 2019     Years since quittin.9    Smokeless tobacco: Never Used   Substance and Sexual  Activity    Alcohol use: No     Alcohol/week: 0.0 standard drinks     Frequency: Never    Drug use: No    Sexual activity: Not Currently   Lifestyle    Physical activity     Days per week: Not on file     Minutes per session: Not on file    Stress: Not on file   Relationships    Social connections     Talks on phone: Not on file     Gets together: Not on file     Attends Muslim service: Not on file     Active member of club or organization: Not on file     Attends meetings of clubs or organizations: Not on file     Relationship status: Not on file   Other Topics Concern    Not on file   Social History Narrative    Not on file       Current Outpatient Medications   Medication Sig Dispense Refill    albuterol (ACCUNEB) 0.63 mg/3 mL Nebu Take 3 mLs (0.63 mg total) by nebulization every 6 (six) hours as needed. Rescue 75 mL 11    albuterol (PROVENTIL/VENTOLIN HFA) 90 mcg/actuation inhaler INHALE 2 PUFFS INTO THE LUNGS EVERY 6 HOURS AS NEEDED FOR RESCUE 54 g 3    amoxicillin-clavulanate 875-125mg (AUGMENTIN) 875-125 mg per tablet Take 1 tablet by mouth 2 (two) times daily. 20 tablet 0    apixaban (ELIQUIS) 5 mg Tab Take 1 tablet (5 mg total) by mouth 2 (two) times daily. 180 tablet 0    ascorbic acid, vitamin C, (VITAMIN C) 500 MG tablet Take 2 tablets (1,000 mg total) by mouth every evening.      atorvastatin (LIPITOR) 20 MG tablet Take 1 tablet (20 mg total) by mouth once daily. 90 tablet 3    blood-glucose meter (TRUE METRIX AIR GLUCOSE METER) kit AC meals. Insulin dependent.Dispence strips and lancets 3 months. 1 each 0    BREO ELLIPTA 100-25 mcg/dose diskus inhaler INHALE 1 PUFF INTO THE LUNGS ONE TIME DAILY. 60 each 3    clonazePAM (KLONOPIN) 0.5 MG tablet Take 1 tablet (0.5 mg total) by mouth 2 (two) times daily. TAKE ONE TABLET BY MOUTH TWO TIMES A DAY AS NEEDED. Medically necessary 60 tablet 1    doxycycline (VIBRA-TABS) 100 MG tablet Take 1 tablet (100 mg total) by mouth 2 (two) times  daily. 20 tablet 1    ferrous sulfate (FEOSOL) 325 mg (65 mg iron) Tab tablet Take 1 tablet (325 mg total) by mouth once daily. 60 tablet 3    fluticasone propionate (FLONASE) 50 mcg/actuation nasal spray 1 spray (50 mcg total) by Each Nostril route once daily. 1 g 3    furosemide (LASIX) 40 MG tablet Take 0.5 tablets (20 mg total) by mouth 3 (three) times a week. 6 tablet 2    gabapentin (NEURONTIN) 800 MG tablet Take 1 tablet (800 mg total) by mouth 3 (three) times daily. 270 tablet 3    HYDROcodone-acetaminophen (NORCO)  mg per tablet Take 1 tablet by mouth every 12 (twelve) hours as needed for Pain. Medical necessary 60 tablet 0    lancets Misc To check BG 3 times daily, to use with insurance preferred meter. 300 each 11    levalbuterol (XOPENEX) 0.63 mg/3 mL nebulizer solution INHALE THE CONTENTS OF 1 VIAL BY NEBULIZATION 4 times  DAILY.    DX: J43.9 792 mL 0    lisinopriL (PRINIVIL,ZESTRIL) 20 MG tablet Take 1 tablet (20 mg total) by mouth 2 (two) times daily. 90 tablet 11    metFORMIN (GLUCOPHAGE) 500 MG tablet Take 1 tablet (500 mg total) by mouth daily with breakfast. 90 tablet 3    multivitamin (THERAGRAN) tablet Take 1 tablet by mouth once daily.      nystatin (NYSTOP) powder Apply topically 2 (two) times daily. 120 g 11    nystatin-triamcinolone (MYCOLOG II) cream Apply topically 3 (three) times daily. 30 g 1    omega-3 acid ethyl esters (LOVAZA) 1 gram capsule 2 (two) times daily.       omeprazole (PRILOSEC) 20 MG capsule TAKE ONE CAPSULE BY MOUTH EVERY DAY 30 capsule 11    omeprazole (PRILOSEC) 20 MG capsule Take 1 capsule (20 mg total) by mouth 2 (two) times daily before meals. 180 capsule 3    ondansetron (ZOFRAN-ODT) 8 MG TbDL DISSOLVE 1 TABLET ON THE TONGUE EVERY 8 HOURS 270 tablet 3    polyethylene glycol (GLYCOLAX) 17 gram/dose powder       potassium chloride SA (K-DUR,KLOR-CON) 20 MEQ tablet Take 1 tablet (20 mEq total) by mouth once daily. 30 tablet 11    senna-docusate  8.6-50 mg (PERICOLACE) 8.6-50 mg per tablet Take 1 tablet by mouth 2 (two) times daily as needed for Constipation.      traZODone (DESYREL) 50 MG tablet Take 1.5 tablets (75 mg total) by mouth every evening. 135 tablet 3    TRUE METRIX GLUCOSE TEST STRIP Strp TEST BLOOD SUGAR THREE TIMES A DAY  300 strip 3    loratadine (CLARITIN) 10 mg tablet Take 1 tablet (10 mg total) by mouth once daily.  0     No current facility-administered medications for this visit.      Facility-Administered Medications Ordered in Other Visits   Medication Dose Route Frequency Provider Last Rate Last Dose    sodium hyaluronate (orthovisc) Syrg 15 mg  15 mg   Mehran Carrasco MD   15 mg at 02/07/20 1000    sodium hyaluronate (orthovisc) Syrg 15 mg  15 mg   Mehran Carrasco MD   15 mg at 02/07/20 1000       Review of patient's allergies indicates:   Allergen Reactions    Dilaudid [hydromorphone] Anaphylaxis     Other reaction(s): Anaphylaxis  Other reaction(s): Unknown    Zyvox [linezolid in dextrose 5%] Shortness Of Breath       Physical examination  Vitals Reviewed  Gen. Well-dressed well-nourished   Skin warm dry and intact.  No rashes noted.  Chest.  Respirations are even unlabored.  Lungs are clear to auscultation.  Cardiac regular rate and rhythm.  No chest wall adenopathy noted.  Neuro. Awake alert oriented x4.  Normal judgment and cognition noted.  Extremities no clubbing cyanosis or edema noted.     Call or return to clinic prn if these symptoms worsen or fail to improve as anticipated.

## 2020-08-01 RX ORDER — DOXYCYCLINE HYCLATE 100 MG
100 TABLET ORAL 2 TIMES DAILY
Qty: 20 TABLET | Refills: 1 | Status: SHIPPED | OUTPATIENT
Start: 2020-08-01 | End: 2020-09-10 | Stop reason: ALTCHOICE

## 2020-08-01 RX ORDER — FUROSEMIDE 40 MG/1
20 TABLET ORAL
Qty: 6 TABLET | Refills: 2 | Status: SHIPPED | OUTPATIENT
Start: 2020-08-03 | End: 2020-08-27 | Stop reason: SDUPTHER

## 2020-08-01 RX ORDER — NYSTATIN 100000 [USP'U]/G
POWDER TOPICAL 2 TIMES DAILY
Qty: 120 G | Refills: 11 | Status: SHIPPED | OUTPATIENT
Start: 2020-08-01 | End: 2020-08-24 | Stop reason: SDUPTHER

## 2020-08-01 RX ORDER — CLONAZEPAM 0.5 MG/1
0.5 TABLET ORAL 2 TIMES DAILY
Qty: 60 TABLET | Refills: 0 | Status: SHIPPED | OUTPATIENT
Start: 2020-08-01 | End: 2020-08-24 | Stop reason: SDUPTHER

## 2020-08-01 RX ORDER — HYDROCODONE BITARTRATE AND ACETAMINOPHEN 10; 325 MG/1; MG/1
1 TABLET ORAL EVERY 12 HOURS PRN
Qty: 60 TABLET | Refills: 0 | Status: SHIPPED | OUTPATIENT
Start: 2020-08-01 | End: 2020-08-24 | Stop reason: SDUPTHER

## 2020-08-02 DIAGNOSIS — M51.36 LUMBAR DEGENERATIVE DISC DISEASE: ICD-10-CM

## 2020-08-02 DIAGNOSIS — M43.12 SPONDYLOLISTHESIS OF CERVICAL REGION: ICD-10-CM

## 2020-08-02 RX ORDER — HYDROCODONE BITARTRATE AND ACETAMINOPHEN 10; 325 MG/1; MG/1
1 TABLET ORAL EVERY 12 HOURS PRN
Qty: 60 TABLET | Refills: 0 | Status: CANCELLED | OUTPATIENT
Start: 2020-08-02

## 2020-08-02 RX ORDER — CLONAZEPAM 0.5 MG/1
0.5 TABLET ORAL 2 TIMES DAILY
Qty: 60 TABLET | Refills: 0 | Status: CANCELLED | OUTPATIENT
Start: 2020-08-02

## 2020-08-04 ENCOUNTER — INFUSION (OUTPATIENT)
Dept: INFUSION THERAPY | Facility: HOSPITAL | Age: 70
End: 2020-08-04
Attending: INTERNAL MEDICINE
Payer: MEDICARE

## 2020-08-04 VITALS
DIASTOLIC BLOOD PRESSURE: 83 MMHG | HEART RATE: 75 BPM | SYSTOLIC BLOOD PRESSURE: 137 MMHG | RESPIRATION RATE: 18 BRPM | WEIGHT: 222.5 LBS | HEIGHT: 68 IN | BODY MASS INDEX: 33.72 KG/M2 | TEMPERATURE: 98 F

## 2020-08-04 DIAGNOSIS — D50.1 IRON DEFICIENCY ANEMIA DUE TO SIDEROPENIC DYSPHAGIA: Primary | ICD-10-CM

## 2020-08-04 PROCEDURE — 96365 THER/PROPH/DIAG IV INF INIT: CPT

## 2020-08-04 PROCEDURE — 63600175 PHARM REV CODE 636 W HCPCS: Mod: JG | Performed by: INTERNAL MEDICINE

## 2020-08-04 PROCEDURE — 25000003 PHARM REV CODE 250: Performed by: INTERNAL MEDICINE

## 2020-08-04 RX ORDER — SODIUM CHLORIDE 0.9 % (FLUSH) 0.9 %
10 SYRINGE (ML) INJECTION
Status: DISCONTINUED | OUTPATIENT
Start: 2020-08-04 | End: 2020-08-04 | Stop reason: HOSPADM

## 2020-08-04 RX ORDER — HEPARIN 100 UNIT/ML
500 SYRINGE INTRAVENOUS
Status: DISCONTINUED | OUTPATIENT
Start: 2020-08-04 | End: 2020-08-04 | Stop reason: HOSPADM

## 2020-08-04 RX ADMIN — FERRIC CARBOXYMALTOSE INJECTION 750 MG: 50 INJECTION, SOLUTION INTRAVENOUS at 02:08

## 2020-08-04 NOTE — PLAN OF CARE
Problem: Activity Intolerance  Goal: Enhanced Capacity and Energy  Outcome: Ongoing, Progressing  Intervention: Optimize Activity Tolerance  Flowsheets (Taken 8/4/2020 1402)  Self-Care Promotion: independence encouraged  Activity Management:   ambulated - L4   activity encouraged  Environmental Support: rest periods encouraged

## 2020-08-11 ENCOUNTER — INFUSION (OUTPATIENT)
Dept: INFUSION THERAPY | Facility: HOSPITAL | Age: 70
End: 2020-08-11
Attending: INTERNAL MEDICINE
Payer: MEDICARE

## 2020-08-11 VITALS
HEART RATE: 74 BPM | HEIGHT: 68 IN | TEMPERATURE: 98 F | SYSTOLIC BLOOD PRESSURE: 122 MMHG | RESPIRATION RATE: 19 BRPM | BODY MASS INDEX: 32.54 KG/M2 | DIASTOLIC BLOOD PRESSURE: 74 MMHG | WEIGHT: 214.69 LBS

## 2020-08-11 DIAGNOSIS — D50.1 IRON DEFICIENCY ANEMIA DUE TO SIDEROPENIC DYSPHAGIA: Primary | ICD-10-CM

## 2020-08-11 PROCEDURE — 25000003 PHARM REV CODE 250: Performed by: INTERNAL MEDICINE

## 2020-08-11 PROCEDURE — 63600175 PHARM REV CODE 636 W HCPCS: Mod: JG | Performed by: INTERNAL MEDICINE

## 2020-08-11 PROCEDURE — 96365 THER/PROPH/DIAG IV INF INIT: CPT

## 2020-08-11 RX ADMIN — FERRIC CARBOXYMALTOSE INJECTION 750 MG: 50 INJECTION, SOLUTION INTRAVENOUS at 10:08

## 2020-08-14 ENCOUNTER — OFFICE VISIT (OUTPATIENT)
Dept: ORTHOPEDICS | Facility: CLINIC | Age: 70
End: 2020-08-14
Payer: MEDICARE

## 2020-08-14 VITALS — BODY MASS INDEX: 32.43 KG/M2 | HEIGHT: 68 IN | RESPIRATION RATE: 18 BRPM | TEMPERATURE: 98 F | WEIGHT: 214 LBS

## 2020-08-14 DIAGNOSIS — M17.0 PRIMARY OSTEOARTHRITIS OF BOTH KNEES: Primary | ICD-10-CM

## 2020-08-14 DIAGNOSIS — M17.0 PRIMARY OSTEOARTHRITIS OF KNEES, BILATERAL: Primary | ICD-10-CM

## 2020-08-14 PROCEDURE — 1125F AMNT PAIN NOTED PAIN PRSNT: CPT | Mod: HCNC,S$GLB,, | Performed by: ORTHOPAEDIC SURGERY

## 2020-08-14 PROCEDURE — 99999 PR PBB SHADOW E&M-EST. PATIENT-LVL V: CPT | Mod: PBBFAC,HCNC,, | Performed by: ORTHOPAEDIC SURGERY

## 2020-08-14 PROCEDURE — 3008F BODY MASS INDEX DOCD: CPT | Mod: HCNC,CPTII,S$GLB, | Performed by: ORTHOPAEDIC SURGERY

## 2020-08-14 PROCEDURE — 99213 OFFICE O/P EST LOW 20 MIN: CPT | Mod: 25,HCNC,S$GLB, | Performed by: ORTHOPAEDIC SURGERY

## 2020-08-14 PROCEDURE — 1159F PR MEDICATION LIST DOCUMENTED IN MEDICAL RECORD: ICD-10-PCS | Mod: HCNC,S$GLB,, | Performed by: ORTHOPAEDIC SURGERY

## 2020-08-14 PROCEDURE — 99999 PR PBB SHADOW E&M-EST. PATIENT-LVL V: ICD-10-PCS | Mod: PBBFAC,HCNC,, | Performed by: ORTHOPAEDIC SURGERY

## 2020-08-14 PROCEDURE — 1101F PR PT FALLS ASSESS DOC 0-1 FALLS W/OUT INJ PAST YR: ICD-10-PCS | Mod: HCNC,CPTII,S$GLB, | Performed by: ORTHOPAEDIC SURGERY

## 2020-08-14 PROCEDURE — 20610 LARGE JOINT ASPIRATION/INJECTION: BILATERAL KNEE: ICD-10-PCS | Mod: 50,HCNC,S$GLB, | Performed by: ORTHOPAEDIC SURGERY

## 2020-08-14 PROCEDURE — 20610 DRAIN/INJ JOINT/BURSA W/O US: CPT | Mod: 50,HCNC,S$GLB, | Performed by: ORTHOPAEDIC SURGERY

## 2020-08-14 PROCEDURE — 1125F PR PAIN SEVERITY QUANTIFIED, PAIN PRESENT: ICD-10-PCS | Mod: HCNC,S$GLB,, | Performed by: ORTHOPAEDIC SURGERY

## 2020-08-14 PROCEDURE — 1159F MED LIST DOCD IN RCRD: CPT | Mod: HCNC,S$GLB,, | Performed by: ORTHOPAEDIC SURGERY

## 2020-08-14 PROCEDURE — 3008F PR BODY MASS INDEX (BMI) DOCUMENTED: ICD-10-PCS | Mod: HCNC,CPTII,S$GLB, | Performed by: ORTHOPAEDIC SURGERY

## 2020-08-14 PROCEDURE — 99213 PR OFFICE/OUTPT VISIT, EST, LEVL III, 20-29 MIN: ICD-10-PCS | Mod: 25,HCNC,S$GLB, | Performed by: ORTHOPAEDIC SURGERY

## 2020-08-14 PROCEDURE — 1101F PT FALLS ASSESS-DOCD LE1/YR: CPT | Mod: HCNC,CPTII,S$GLB, | Performed by: ORTHOPAEDIC SURGERY

## 2020-08-14 RX ORDER — TRIAMCINOLONE ACETONIDE 40 MG/ML
40 INJECTION, SUSPENSION INTRA-ARTICULAR; INTRAMUSCULAR
Status: DISCONTINUED | OUTPATIENT
Start: 2020-08-14 | End: 2020-08-14 | Stop reason: HOSPADM

## 2020-08-14 RX ADMIN — TRIAMCINOLONE ACETONIDE 40 MG: 40 INJECTION, SUSPENSION INTRA-ARTICULAR; INTRAMUSCULAR at 09:08

## 2020-08-14 NOTE — PROGRESS NOTES
Past Medical History:   Diagnosis Date    *Atrial flutter     Angina pectoris 2017    Anxiety     Arthritis     Asthma     Atrial fibrillation     Back pain     Cataract     OD    CHF (congestive heart failure)     COPD (chronic obstructive pulmonary disease)     Depression     Diabetes mellitus     Emphysema of lung     Heart failure     Hepatomegaly 2/3/2016    Hernia     History of MI (myocardial infarction) 2016    Hypercapnic respiratory failure, chronic 2016    Hyperlipidemia     Hypertension     Iron deficiency anemia 2/3/2016    Myocardial infarction     Obesity     Peripheral vascular disease     Pneumonia     Polyneuropathy     Retinal detachment     OS    Septic shock 2017    Skin ulcer 3/18/2017    Tobacco dependence     Type II 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, not stated as uncontrolled(250.60)        Past Surgical History:   Procedure Laterality Date    BREAST BIOPSY Left 10 yrs ago    benign    CATARACT EXTRACTION Left     OS      SECTION      x2    EYE SURGERY      HYSTERECTOMY      partial    OOPHORECTOMY      one ovary conserved    RETINAL DETACHMENT SURGERY      buckle --OS    TONSILLECTOMY         Current Outpatient Medications   Medication Sig    albuterol (ACCUNEB) 0.63 mg/3 mL Nebu Take 3 mLs (0.63 mg total) by nebulization every 6 (six) hours as needed. Rescue    albuterol (PROVENTIL/VENTOLIN HFA) 90 mcg/actuation inhaler INHALE 2 PUFFS INTO THE LUNGS EVERY 6 HOURS AS NEEDED FOR RESCUE    amoxicillin-clavulanate 875-125mg (AUGMENTIN) 875-125 mg per tablet Take 1 tablet by mouth 2 (two) times daily.    ascorbic acid, vitamin C, (VITAMIN C) 500 MG tablet Take 2 tablets (1,000 mg total) by mouth every evening.    atorvastatin (LIPITOR) 20 MG tablet Take 1 tablet (20 mg total) by mouth once daily.    blood-glucose meter (TRUE METRIX AIR GLUCOSE METER) kit AC meals. Insulin  dependent.Dispence strips and lancets 3 months.    BREO ELLIPTA 100-25 mcg/dose diskus inhaler INHALE 1 PUFF INTO THE LUNGS ONCE DAILY.    clonazePAM (KLONOPIN) 0.5 MG tablet Take 1 tablet (0.5 mg total) by mouth 2 (two) times daily. TAKE ONE TABLET BY MOUTH TWO TIMES A DAY AS NEEDED. Medically necessary    doxycycline (VIBRA-TABS) 100 MG tablet Take 1 tablet (100 mg total) by mouth 2 (two) times daily.    ELIQUIS 5 mg Tab TAKE 1 TABLET TWICE DAILY    ferrous sulfate (FEOSOL) 325 mg (65 mg iron) Tab tablet Take 1 tablet (325 mg total) by mouth once daily.    fluticasone propionate (FLONASE) 50 mcg/actuation nasal spray 1 spray (50 mcg total) by Each Nostril route once daily.    furosemide (LASIX) 40 MG tablet Take 0.5 tablets (20 mg total) by mouth 3 (three) times a week.    gabapentin (NEURONTIN) 800 MG tablet Take 1 tablet (800 mg total) by mouth 3 (three) times daily.    HYDROcodone-acetaminophen (NORCO)  mg per tablet Take 1 tablet by mouth every 12 (twelve) hours as needed for Pain. Medical necessary    lancets Misc To check BG 3 times daily, to use with insurance preferred meter.    levalbuterol (XOPENEX) 0.63 mg/3 mL nebulizer solution INHALE THE CONTENTS OF 1 VIAL BY NEBULIZATION 4 times  DAILY.    DX: J43.9    lisinopriL (PRINIVIL,ZESTRIL) 20 MG tablet Take 1 tablet (20 mg total) by mouth 2 (two) times daily.    metFORMIN (GLUCOPHAGE) 500 MG tablet Take 1 tablet (500 mg total) by mouth daily with breakfast.    multivitamin (THERAGRAN) tablet Take 1 tablet by mouth once daily.    nystatin (NYSTOP) powder Apply topically 2 (two) times daily.    nystatin-triamcinolone (MYCOLOG II) cream Apply topically 3 (three) times daily.    omega-3 acid ethyl esters (LOVAZA) 1 gram capsule 2 (two) times daily.     omeprazole (PRILOSEC) 20 MG capsule TAKE ONE CAPSULE BY MOUTH EVERY DAY    omeprazole (PRILOSEC) 20 MG capsule Take 1 capsule (20 mg total) by mouth 2 (two) times daily before meals.     ondansetron (ZOFRAN-ODT) 8 MG TbDL DISSOLVE 1 TABLET ON THE TONGUE EVERY 8 HOURS    polyethylene glycol (GLYCOLAX) 17 gram/dose powder     potassium chloride SA (K-DUR,KLOR-CON) 20 MEQ tablet Take 1 tablet (20 mEq total) by mouth once daily.    senna-docusate 8.6-50 mg (PERICOLACE) 8.6-50 mg per tablet Take 1 tablet by mouth 2 (two) times daily as needed for Constipation.    traZODone (DESYREL) 50 MG tablet Take 1.5 tablets (75 mg total) by mouth every evening.    TRUE METRIX GLUCOSE TEST STRIP Strp TEST BLOOD SUGAR THREE TIMES A DAY     loratadine (CLARITIN) 10 mg tablet Take 1 tablet (10 mg total) by mouth once daily.     No current facility-administered medications for this visit.      Facility-Administered Medications Ordered in Other Visits   Medication    sodium hyaluronate (orthovisc) Syrg 15 mg    sodium hyaluronate (orthovisc) Syrg 15 mg       Allergies   Allergen Reactions    Dilaudid [Hydromorphone] Anaphylaxis     Other reaction(s): Anaphylaxis  Other reaction(s): Unknown       Family History   Problem Relation Age of Onset    Arthritis Father     Heart disease Father     Early death Sister 30        heart disease    Heart disease Sister 32        MI    Cancer Brother         Lung cancer    No Known Problems Daughter     Blindness Son         due to accident//    Arthritis Sister     Heart disease Sister     Crohn's disease Sister     Alcohol abuse Mother     Breast cancer Neg Hx     Glaucoma Neg Hx     Macular degeneration Neg Hx     Retinal detachment Neg Hx     Amblyopia Neg Hx     Diabetes Neg Hx     Cataracts Neg Hx     Hypertension Neg Hx     Strabismus Neg Hx     Stroke Neg Hx     Thyroid disease Neg Hx        Social History     Socioeconomic History    Marital status:      Spouse name: Not on file    Number of children: Not on file    Years of education: Not on file    Highest education level: Not on file   Occupational History    Not on file   Social  Needs    Financial resource strain: Not on file    Food insecurity     Worry: Not on file     Inability: Not on file    Transportation needs     Medical: Not on file     Non-medical: Not on file   Tobacco Use    Smoking status: Current Every Day Smoker     Packs/day: 0.25     Years: 50.00     Pack years: 12.50     Types: Cigarettes     Start date: 1968     Last attempt to quit: 2019     Years since quittin.0    Smokeless tobacco: Never Used   Substance and Sexual Activity    Alcohol use: No     Alcohol/week: 0.0 standard drinks     Frequency: Never    Drug use: No    Sexual activity: Not Currently   Lifestyle    Physical activity     Days per week: Not on file     Minutes per session: Not on file    Stress: Not on file   Relationships    Social connections     Talks on phone: Not on file     Gets together: Not on file     Attends Jain service: Not on file     Active member of club or organization: Not on file     Attends meetings of clubs or organizations: Not on file     Relationship status: Not on file   Other Topics Concern    Not on file   Social History Narrative    Not on file       Chief Complaint:   Chief Complaint   Patient presents with    Left Knee - Pain    Right Knee - Pain       Consulting Physician: No ref. provider found    History of present illness: This is a 64-year-old young lady who reports the return of bilateral knee pain.  Pain today 6/10. No new injury. Previous injections helped.    Review of Systems:    Constitution: Denies chills, fever, and sweats.    HENT: Denies headaches. Reports blurry vision.    Cardiovascular: Denies chest pain or irregular heart beat.    Respiratory: Denies cough. Reports shortness of breath.    Gastrointestinal: Denies abdominal pain, nausea, or vomiting.    Musculoskeletal:  Denies muscle cramps.    Neurological: Denies dizziness or focal weakness.    Psychiatric/Behavioral: Normal mental status.  Anxiety.    Hematologic/Lymphatic: Denies bleeding problem or easy bruising/bleeding.    Skin: Denies rash or suspicious lesions.      Examination:    Vital Signs:    Vitals:    08/14/20 0907   Resp: 18   Temp: 98.2 °F (36.8 °C)       Body mass index is 32.54 kg/m².    This a well-developed, well nourished patient in no acute distress.    Alert and oriented and cooperative to examination.       Physical Exam: Left Knee Exam    Gait   antalgic    Skin  Rash:   None  Scars:   None    Inspection  Erythema:  None  Ecchymosis:  None  Effusion:  Mild  Masses:  None  Lymphadenopathy: None    Range of Motion: Full - 0 to 130°    Medial Joint : Mild  Lateral Joint : Mild    Patellofemoral Tenderness: None  Patellofemoral Crepitus: None    Lachman:  Normal  Anterior Drawer: Normal  Posterior Drawer: Normal    Dilia's:  Negative  Apley's:  Negative    Varus Stress:  Stable  Valgus Stress: Stable    Strength:  5/5    Pulses:  Palpable distal    Sensation:  Intact      Right Knee Exam    Gait:   Antalgic    Skin  Rash:   None  Scars:   None    Inspection  Erythema:  None  Ecchymosis:  None  Effusion:  Mild  Masses:  None  Lymphadenopathy: None    Range of Motion: Full - 0 to 130°    Medial Joint : Mild  Lateral Joint : Mild    Patellofemoral Tenderness: None  Patellofemoral Crepitus: None    Lachman:  Normal  Anterior Drawer: Normal  Posterior Drawer: Normal    Dilia's:  Negative  Apley's:  Negative    Varus Stress:  Stable  Valgus Stress: Stable    Strength:  5/5    Pulses:  Palpable distal    Sensation:  Intact          Imaging: X-rays done before of both knees reveals degenerative changes that are severe bilateral.     Assessment: Primary osteoarthritis of both knees  -     Large Joint Aspiration/Injection: bilateral knee        Plan:  She has done well with injections in the past.  She would like to do a steroid today and then Euflexxa series.    DISCLAIMER: This note may  have been dictated using voice recognition software and may contain grammatical errors. Report sent to referring provider as required.

## 2020-08-24 ENCOUNTER — TELEPHONE (OUTPATIENT)
Dept: FAMILY MEDICINE | Facility: CLINIC | Age: 70
End: 2020-08-24

## 2020-08-24 DIAGNOSIS — M51.36 LUMBAR DEGENERATIVE DISC DISEASE: ICD-10-CM

## 2020-08-24 RX ORDER — CLONAZEPAM 0.5 MG/1
0.5 TABLET ORAL 2 TIMES DAILY
Qty: 60 TABLET | Refills: 1 | Status: SHIPPED | OUTPATIENT
Start: 2020-08-24 | End: 2020-10-14

## 2020-08-24 NOTE — TELEPHONE ENCOUNTER
Pt pharmacy has been updated as requested to [facility].

## 2020-08-24 NOTE — TELEPHONE ENCOUNTER
----- Message from Shaista King sent at 8/24/2020  8:50 AM CDT -----  Contact: call pt [phone]    Type: Needs Medical Advice  Who Called:  pt  Pharmacy name and phone #:  [facility] [address] [phone]  Best Call Back Number:call pt [phone]  Additional Information: ,please call  pt  wants  her  meds  sent  to  the [facility]  now where pt  moved

## 2020-08-24 NOTE — TELEPHONE ENCOUNTER
Called pt regarding msg about having medications sent to new pharmacy. No answer, LM for pt to call back.

## 2020-08-24 NOTE — TELEPHONE ENCOUNTER
----- Message from Ray Ellison sent at 8/24/2020 10:25 AM CDT -----  Type:  Patient Returning Call    Who Called:  Patient  Who Left Message for Patient:  Anastasia  Does the patient know what this is regarding?:  New Pharmacy  Best Call Back Number: [phone]  Additional Information:

## 2020-08-27 ENCOUNTER — TELEPHONE (OUTPATIENT)
Dept: FAMILY MEDICINE | Facility: CLINIC | Age: 70
End: 2020-08-27

## 2020-08-27 DIAGNOSIS — I50.22 CHRONIC SYSTOLIC CONGESTIVE HEART FAILURE: ICD-10-CM

## 2020-08-27 DIAGNOSIS — M51.36 LUMBAR DEGENERATIVE DISC DISEASE: ICD-10-CM

## 2020-08-27 DIAGNOSIS — M43.12 SPONDYLOLISTHESIS OF CERVICAL REGION: ICD-10-CM

## 2020-08-27 RX ORDER — FUROSEMIDE 40 MG/1
20 TABLET ORAL
Qty: 6 TABLET | Refills: 2 | Status: CANCELLED | OUTPATIENT
Start: 2020-08-28 | End: 2020-11-26

## 2020-08-27 RX ORDER — HYDROCODONE BITARTRATE AND ACETAMINOPHEN 10; 325 MG/1; MG/1
1 TABLET ORAL EVERY 12 HOURS PRN
Qty: 60 TABLET | Refills: 0 | Status: CANCELLED | OUTPATIENT
Start: 2020-08-27

## 2020-08-27 RX ORDER — NYSTATIN 100000 [USP'U]/G
POWDER TOPICAL 2 TIMES DAILY
Qty: 120 G | Refills: 11 | Status: CANCELLED | OUTPATIENT
Start: 2020-08-27

## 2020-08-27 RX ORDER — NYSTATIN AND TRIAMCINOLONE ACETONIDE 100000; 1 [USP'U]/G; MG/G
CREAM TOPICAL 3 TIMES DAILY
Qty: 30 G | Refills: 1 | Status: CANCELLED | OUTPATIENT
Start: 2020-08-27

## 2020-08-27 RX ORDER — FUROSEMIDE 40 MG/1
20 TABLET ORAL
Qty: 6 TABLET | Refills: 2 | Status: SHIPPED | OUTPATIENT
Start: 2020-08-28 | End: 2020-09-30

## 2020-08-27 NOTE — TELEPHONE ENCOUNTER
Prescription for Furosemide e-scribed to [facility]. Patient requesting this medication via [facility]. Please advise.

## 2020-08-27 NOTE — TELEPHONE ENCOUNTER
----- Message from Shaista King sent at 8/27/2020 10:39 AM CDT -----  Contact: call  [phone]    Type:  Patient Returning Call    Who Called: pt  Who Left Message for Patient: nurse  Does the patient know what this is regarding?:   meds  Best Call Back Number: [phone]  Additional Information:  please call  for details

## 2020-08-27 NOTE — TELEPHONE ENCOUNTER
This matter was handled in a separate encounter.           ----- Message from Shaista King sent at 8/27/2020 10:39 AM CDT -----  Contact: call  [phone]    Type:  Patient Returning Call    Who Called: pt  Who Left Message for Patient: nurse  Does the patient know what this is regarding?:   meds  Best Call Back Number: [phone]  Additional Information:  please call  for details

## 2020-08-28 ENCOUNTER — OFFICE VISIT (OUTPATIENT)
Dept: ORTHOPEDICS | Facility: CLINIC | Age: 70
End: 2020-08-28
Payer: MEDICARE

## 2020-08-28 VITALS — HEIGHT: 68 IN | TEMPERATURE: 97 F | RESPIRATION RATE: 20 BRPM | BODY MASS INDEX: 32.43 KG/M2 | WEIGHT: 214 LBS

## 2020-08-28 DIAGNOSIS — M17.0 PRIMARY OSTEOARTHRITIS OF KNEES, BILATERAL: Primary | ICD-10-CM

## 2020-08-28 PROCEDURE — 99499 UNLISTED E&M SERVICE: CPT | Mod: HCNC,S$GLB,, | Performed by: ORTHOPAEDIC SURGERY

## 2020-08-28 PROCEDURE — 99499 NO LOS: ICD-10-PCS | Mod: HCNC,S$GLB,, | Performed by: ORTHOPAEDIC SURGERY

## 2020-08-28 PROCEDURE — 99999 PR PBB SHADOW E&M-EST. PATIENT-LVL V: ICD-10-PCS | Mod: PBBFAC,HCNC,, | Performed by: ORTHOPAEDIC SURGERY

## 2020-08-28 PROCEDURE — 20610 LARGE JOINT ASPIRATION/INJECTION: BILATERAL KNEE: ICD-10-PCS | Mod: 50,HCNC,S$GLB, | Performed by: ORTHOPAEDIC SURGERY

## 2020-08-28 PROCEDURE — 99999 PR PBB SHADOW E&M-EST. PATIENT-LVL V: CPT | Mod: PBBFAC,HCNC,, | Performed by: ORTHOPAEDIC SURGERY

## 2020-08-28 PROCEDURE — 20610 DRAIN/INJ JOINT/BURSA W/O US: CPT | Mod: 50,HCNC,S$GLB, | Performed by: ORTHOPAEDIC SURGERY

## 2020-08-28 NOTE — PROCEDURES
Large Joint Aspiration/Injection: bilateral knee    Date/Time: 8/28/2020 10:15 AM  Performed by: Mehran Carrasco MD  Authorized by: Mehran Carrasco MD     Consent Done?:  Yes (Verbal)  Indications:  Pain  Timeout: prior to procedure the correct patient, procedure, and site was verified    Approach:  Anteromedial  Location:  Knee  Laterality:  Bilateral  Site:  Bilateral knee  Medications (Right):  15 mg sodium hyaluronate (orthovisc) 30 mg/2 mL  Medications (Left):  15 mg sodium hyaluronate (orthovisc) 30 mg/2 mL

## 2020-08-28 NOTE — PROGRESS NOTES
Past Medical History:   Diagnosis Date    *Atrial flutter     Angina pectoris 2017    Anxiety     Arthritis     Asthma     Atrial fibrillation     Back pain     Cataract     OD    CHF (congestive heart failure)     COPD (chronic obstructive pulmonary disease)     Depression     Diabetes mellitus     Emphysema of lung     Heart failure     Hepatomegaly 2/3/2016    Hernia     History of MI (myocardial infarction) 2016    Hypercapnic respiratory failure, chronic 2016    Hyperlipidemia     Hypertension     Iron deficiency anemia 2/3/2016    Myocardial infarction     Obesity     Peripheral vascular disease     Pneumonia     Polyneuropathy     Retinal detachment     OS    Septic shock 2017    Skin ulcer 3/18/2017    Tobacco dependence     Type II 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, not stated as uncontrolled(250.60)        Past Surgical History:   Procedure Laterality Date    BREAST BIOPSY Left 10 yrs ago    benign    CATARACT EXTRACTION Left     OS      SECTION      x2    EYE SURGERY      HYSTERECTOMY      partial    OOPHORECTOMY      one ovary conserved    RETINAL DETACHMENT SURGERY      buckle --OS    TONSILLECTOMY         Current Outpatient Medications   Medication Sig    albuterol (ACCUNEB) 0.63 mg/3 mL Nebu Take 3 mLs (0.63 mg total) by nebulization every 6 (six) hours as needed. Rescue    albuterol (PROVENTIL/VENTOLIN HFA) 90 mcg/actuation inhaler INHALE 2 PUFFS INTO THE LUNGS EVERY 6 HOURS AS NEEDED FOR RESCUE    amoxicillin-clavulanate 875-125mg (AUGMENTIN) 875-125 mg per tablet Take 1 tablet by mouth 2 (two) times daily.    ascorbic acid, vitamin C, (VITAMIN C) 500 MG tablet Take 2 tablets (1,000 mg total) by mouth every evening.    atorvastatin (LIPITOR) 20 MG tablet Take 1 tablet (20 mg total) by mouth once daily.    blood-glucose meter (TRUE METRIX AIR GLUCOSE METER) kit AC meals. Insulin  dependent.Dispence strips and lancets 3 months.    BREO ELLIPTA 100-25 mcg/dose diskus inhaler INHALE 1 PUFF INTO THE LUNGS ONCE DAILY.    clonazePAM (KLONOPIN) 0.5 MG tablet Take 1 tablet (0.5 mg total) by mouth 2 (two) times daily. TAKE ONE TABLET BY MOUTH TWO TIMES A DAY AS NEEDED. Medically necessary    doxycycline (VIBRA-TABS) 100 MG tablet Take 1 tablet (100 mg total) by mouth 2 (two) times daily.    ELIQUIS 5 mg Tab TAKE 1 TABLET TWICE DAILY    ferrous sulfate (FEOSOL) 325 mg (65 mg iron) Tab tablet Take 1 tablet (325 mg total) by mouth once daily.    fluticasone propionate (FLONASE) 50 mcg/actuation nasal spray 1 spray (50 mcg total) by Each Nostril route once daily.    furosemide (LASIX) 40 MG tablet Take 0.5 tablets (20 mg total) by mouth 3 (three) times a week.    gabapentin (NEURONTIN) 800 MG tablet Take 1 tablet (800 mg total) by mouth 3 (three) times daily.    HYDROcodone-acetaminophen (NORCO)  mg per tablet Take 1 tablet by mouth every 12 (twelve) hours as needed for Pain. Medical necessary    lancets Misc To check BG 3 times daily, to use with insurance preferred meter.    levalbuterol (XOPENEX) 0.63 mg/3 mL nebulizer solution INHALE THE CONTENTS OF 1 VIAL BY NEBULIZATION 4 times  DAILY.    DX: J43.9    lisinopriL (PRINIVIL,ZESTRIL) 20 MG tablet Take 1 tablet (20 mg total) by mouth 2 (two) times daily.    metFORMIN (GLUCOPHAGE) 500 MG tablet Take 1 tablet (500 mg total) by mouth daily with breakfast.    multivitamin (THERAGRAN) tablet Take 1 tablet by mouth once daily.    nystatin (NYSTOP) powder Apply topically 2 (two) times daily.    nystatin-triamcinolone (MYCOLOG II) cream Apply topically 3 (three) times daily.    omega-3 acid ethyl esters (LOVAZA) 1 gram capsule 2 (two) times daily.     omeprazole (PRILOSEC) 20 MG capsule TAKE ONE CAPSULE BY MOUTH EVERY DAY    omeprazole (PRILOSEC) 20 MG capsule Take 1 capsule (20 mg total) by mouth 2 (two) times daily before meals.     ondansetron (ZOFRAN-ODT) 8 MG TbDL DISSOLVE 1 TABLET ON THE TONGUE EVERY 8 HOURS    polyethylene glycol (GLYCOLAX) 17 gram/dose powder     potassium chloride SA (K-DUR,KLOR-CON) 20 MEQ tablet Take 1 tablet (20 mEq total) by mouth once daily.    senna-docusate 8.6-50 mg (PERICOLACE) 8.6-50 mg per tablet Take 1 tablet by mouth 2 (two) times daily as needed for Constipation.    traZODone (DESYREL) 50 MG tablet Take 1.5 tablets (75 mg total) by mouth every evening.    TRUE METRIX GLUCOSE TEST STRIP Strp TEST BLOOD SUGAR THREE TIMES A DAY     loratadine (CLARITIN) 10 mg tablet Take 1 tablet (10 mg total) by mouth once daily.     No current facility-administered medications for this visit.      Facility-Administered Medications Ordered in Other Visits   Medication    sodium hyaluronate (orthovisc) Syrg 15 mg    sodium hyaluronate (orthovisc) Syrg 15 mg       Allergies   Allergen Reactions    Dilaudid [Hydromorphone] Anaphylaxis     Other reaction(s): Anaphylaxis  Other reaction(s): Unknown       Family History   Problem Relation Age of Onset    Arthritis Father     Heart disease Father     Early death Sister 30        heart disease    Heart disease Sister 32        MI    Cancer Brother         Lung cancer    No Known Problems Daughter     Blindness Son         due to accident//    Arthritis Sister     Heart disease Sister     Crohn's disease Sister     Alcohol abuse Mother     Breast cancer Neg Hx     Glaucoma Neg Hx     Macular degeneration Neg Hx     Retinal detachment Neg Hx     Amblyopia Neg Hx     Diabetes Neg Hx     Cataracts Neg Hx     Hypertension Neg Hx     Strabismus Neg Hx     Stroke Neg Hx     Thyroid disease Neg Hx        Social History     Socioeconomic History    Marital status:      Spouse name: Not on file    Number of children: Not on file    Years of education: Not on file    Highest education level: Not on file   Occupational History    Not on file   Social  Needs    Financial resource strain: Not on file    Food insecurity     Worry: Not on file     Inability: Not on file    Transportation needs     Medical: Not on file     Non-medical: Not on file   Tobacco Use    Smoking status: Current Every Day Smoker     Packs/day: 0.25     Years: 50.00     Pack years: 12.50     Types: Cigarettes     Start date: 1968     Last attempt to quit: 2019     Years since quittin.0    Smokeless tobacco: Never Used   Substance and Sexual Activity    Alcohol use: No     Alcohol/week: 0.0 standard drinks     Frequency: Never    Drug use: No    Sexual activity: Not Currently   Lifestyle    Physical activity     Days per week: Not on file     Minutes per session: Not on file    Stress: Not on file   Relationships    Social connections     Talks on phone: Not on file     Gets together: Not on file     Attends Moravian service: Not on file     Active member of club or organization: Not on file     Attends meetings of clubs or organizations: Not on file     Relationship status: Not on file   Other Topics Concern    Not on file   Social History Narrative    Not on file       Chief Complaint:   Chief Complaint   Patient presents with    Right Knee - Pain    Left Knee - Pain    Injections     bilateral orthovisc 1/3       Consulting Physician: Mehran Carrasco MD    History of present illness: This is a 64-year-old young lady who reports the return of bilateral knee pain.  Pain today 6/10. No new injury. Previous injections helped.    Review of Systems:    Constitution: Denies chills, fever, and sweats.    HENT: Denies headaches. Reports blurry vision.    Cardiovascular: Denies chest pain or irregular heart beat.    Respiratory: Denies cough. Reports shortness of breath.    Gastrointestinal: Denies abdominal pain, nausea, or vomiting.    Musculoskeletal:  Denies muscle cramps.    Neurological: Denies dizziness or focal weakness.    Psychiatric/Behavioral: Normal mental  status. Anxiety.    Hematologic/Lymphatic: Denies bleeding problem or easy bruising/bleeding.    Skin: Denies rash or suspicious lesions.      Examination:    Vital Signs:    Vitals:    08/28/20 1003   Resp: 20   Temp: 97 °F (36.1 °C)       Body mass index is 32.54 kg/m².    This a well-developed, well nourished patient in no acute distress.    Alert and oriented and cooperative to examination.       Physical Exam: Left Knee Exam    Gait   antalgic    Skin  Rash:   None  Scars:   None    Inspection  Erythema:  None  Ecchymosis:  None  Effusion:  Mild  Masses:  None  Lymphadenopathy: None    Range of Motion: Full - 0 to 130°    Medial Joint : Mild  Lateral Joint : Mild    Patellofemoral Tenderness: None  Patellofemoral Crepitus: None    Lachman:  Normal  Anterior Drawer: Normal  Posterior Drawer: Normal    Dilia's:  Negative  Apley's:  Negative    Varus Stress:  Stable  Valgus Stress: Stable    Strength:  5/5    Pulses:  Palpable distal    Sensation:  Intact      Right Knee Exam    Gait:   Antalgic    Skin  Rash:   None  Scars:   None    Inspection  Erythema:  None  Ecchymosis:  None  Effusion:  Mild  Masses:  None  Lymphadenopathy: None    Range of Motion: Full - 0 to 130°    Medial Joint : Mild  Lateral Joint : Mild    Patellofemoral Tenderness: None  Patellofemoral Crepitus: None    Lachman:  Normal  Anterior Drawer: Normal  Posterior Drawer: Normal    Dilia's:  Negative  Apley's:  Negative    Varus Stress:  Stable  Valgus Stress: Stable    Strength:  5/5    Pulses:  Palpable distal    Sensation:  Intact          Imaging: X-rays done before of both knees reveals degenerative changes that are severe bilateral.     Assessment: Primary osteoarthritis of knees, bilateral  -     Large Joint Aspiration/Injection: bilateral knee        Plan:   Euflexxa series.    DISCLAIMER: This note may have been dictated using voice recognition software and may contain grammatical  errors. Report sent to referring provider as required.

## 2020-09-01 ENCOUNTER — LAB VISIT (OUTPATIENT)
Dept: LAB | Facility: HOSPITAL | Age: 70
End: 2020-09-01
Attending: INTERNAL MEDICINE
Payer: MEDICARE

## 2020-09-01 DIAGNOSIS — D50.1 IRON DEFICIENCY ANEMIA DUE TO SIDEROPENIC DYSPHAGIA: ICD-10-CM

## 2020-09-01 LAB
BASOPHILS # BLD AUTO: 0.09 K/UL (ref 0–0.2)
BASOPHILS NFR BLD: 0.8 % (ref 0–1.9)
DIFFERENTIAL METHOD: ABNORMAL
EOSINOPHIL # BLD AUTO: 0.7 K/UL (ref 0–0.5)
EOSINOPHIL NFR BLD: 6.4 % (ref 0–8)
ERYTHROCYTE [DISTWIDTH] IN BLOOD BY AUTOMATED COUNT: 22 % (ref 11.5–14.5)
HCT VFR BLD AUTO: 46.1 % (ref 37–48.5)
HGB BLD-MCNC: 13.9 G/DL (ref 12–16)
IMM GRANULOCYTES # BLD AUTO: 0.02 K/UL (ref 0–0.04)
IMM GRANULOCYTES NFR BLD AUTO: 0.2 % (ref 0–0.5)
IRON SERPL-MCNC: 82 UG/DL (ref 30–160)
LYMPHOCYTES # BLD AUTO: 2.2 K/UL (ref 1–4.8)
LYMPHOCYTES NFR BLD: 20.3 % (ref 18–48)
MCH RBC QN AUTO: 27 PG (ref 27–31)
MCHC RBC AUTO-ENTMCNC: 30.2 G/DL (ref 32–36)
MCV RBC AUTO: 90 FL (ref 82–98)
MONOCYTES # BLD AUTO: 1 K/UL (ref 0.3–1)
MONOCYTES NFR BLD: 9.2 % (ref 4–15)
NEUTROPHILS # BLD AUTO: 6.9 K/UL (ref 1.8–7.7)
NEUTROPHILS NFR BLD: 63.1 % (ref 38–73)
NRBC BLD-RTO: 0 /100 WBC
PLATELET # BLD AUTO: 193 K/UL (ref 150–350)
PMV BLD AUTO: 9.7 FL (ref 9.2–12.9)
RBC # BLD AUTO: 5.14 M/UL (ref 4–5.4)
SATURATED IRON: 21 % (ref 20–50)
TOTAL IRON BINDING CAPACITY: 383 UG/DL (ref 250–450)
TRANSFERRIN SERPL-MCNC: 259 MG/DL (ref 200–375)
WBC # BLD AUTO: 10.86 K/UL (ref 3.9–12.7)

## 2020-09-01 PROCEDURE — 85025 COMPLETE CBC W/AUTO DIFF WBC: CPT | Mod: HCNC

## 2020-09-01 PROCEDURE — 36415 COLL VENOUS BLD VENIPUNCTURE: CPT | Mod: HCNC

## 2020-09-01 PROCEDURE — 83540 ASSAY OF IRON: CPT | Mod: HCNC

## 2020-09-02 ENCOUNTER — TELEPHONE (OUTPATIENT)
Dept: HEMATOLOGY/ONCOLOGY | Facility: CLINIC | Age: 70
End: 2020-09-02

## 2020-09-02 ENCOUNTER — PATIENT OUTREACH (OUTPATIENT)
Dept: ADMINISTRATIVE | Facility: OTHER | Age: 70
End: 2020-09-02

## 2020-09-02 NOTE — PROGRESS NOTES
Chart was reviewed for overdue Proactive [facility] Encounters (ARIE)  topics  Updates were requested from care everywhere  Health Maintenance has been updated  LINKS immunization registry triggered

## 2020-09-03 ENCOUNTER — OFFICE VISIT (OUTPATIENT)
Dept: HEMATOLOGY/ONCOLOGY | Facility: CLINIC | Age: 70
End: 2020-09-03
Payer: MEDICARE

## 2020-09-03 VITALS
HEART RATE: 60 BPM | SYSTOLIC BLOOD PRESSURE: 133 MMHG | OXYGEN SATURATION: 95 % | WEIGHT: 211.63 LBS | DIASTOLIC BLOOD PRESSURE: 63 MMHG | HEIGHT: 68 IN | BODY MASS INDEX: 32.07 KG/M2

## 2020-09-03 DIAGNOSIS — D50.1 IRON DEFICIENCY ANEMIA DUE TO SIDEROPENIC DYSPHAGIA: ICD-10-CM

## 2020-09-03 DIAGNOSIS — Z79.899 ON STATIN THERAPY: ICD-10-CM

## 2020-09-03 DIAGNOSIS — G44.009 CLUSTER HEADACHE, NOT INTRACTABLE, UNSPECIFIED CHRONICITY PATTERN: Primary | ICD-10-CM

## 2020-09-03 DIAGNOSIS — R16.0 HEPATOMEGALY: ICD-10-CM

## 2020-09-03 DIAGNOSIS — I48.0 PAROXYSMAL ATRIAL FIBRILLATION: ICD-10-CM

## 2020-09-03 DIAGNOSIS — Z79.01 ANTICOAGULATED: ICD-10-CM

## 2020-09-03 DIAGNOSIS — E11.40 TYPE 2 DIABETES MELLITUS WITH DIABETIC NEUROPATHY, WITHOUT LONG-TERM CURRENT USE OF INSULIN: ICD-10-CM

## 2020-09-03 PROCEDURE — 3075F PR MOST RECENT SYSTOLIC BLOOD PRESS GE 130-139MM HG: ICD-10-PCS | Mod: HCNC,CPTII,S$GLB, | Performed by: INTERNAL MEDICINE

## 2020-09-03 PROCEDURE — 99999 PR PBB SHADOW E&M-EST. PATIENT-LVL V: ICD-10-PCS | Mod: PBBFAC,HCNC,, | Performed by: INTERNAL MEDICINE

## 2020-09-03 PROCEDURE — 99497 ADVNCD CARE PLAN 30 MIN: CPT | Mod: HCNC,S$GLB,, | Performed by: INTERNAL MEDICINE

## 2020-09-03 PROCEDURE — 2024F PR 7 FIELD PHOTOS WITH INTERP/ REVIEW: ICD-10-PCS | Mod: HCNC,S$GLB,, | Performed by: INTERNAL MEDICINE

## 2020-09-03 PROCEDURE — 3044F HG A1C LEVEL LT 7.0%: CPT | Mod: HCNC,CPTII,S$GLB, | Performed by: INTERNAL MEDICINE

## 2020-09-03 PROCEDURE — 99214 PR OFFICE/OUTPT VISIT, EST, LEVL IV, 30-39 MIN: ICD-10-PCS | Mod: HCNC,S$GLB,, | Performed by: INTERNAL MEDICINE

## 2020-09-03 PROCEDURE — 1126F PR PAIN SEVERITY QUANTIFIED, NO PAIN PRESENT: ICD-10-PCS | Mod: HCNC,S$GLB,, | Performed by: INTERNAL MEDICINE

## 2020-09-03 PROCEDURE — 3044F PR MOST RECENT HEMOGLOBIN A1C LEVEL <7.0%: ICD-10-PCS | Mod: HCNC,CPTII,S$GLB, | Performed by: INTERNAL MEDICINE

## 2020-09-03 PROCEDURE — 1101F PT FALLS ASSESS-DOCD LE1/YR: CPT | Mod: HCNC,CPTII,S$GLB, | Performed by: INTERNAL MEDICINE

## 2020-09-03 PROCEDURE — 1101F PR PT FALLS ASSESS DOC 0-1 FALLS W/OUT INJ PAST YR: ICD-10-PCS | Mod: HCNC,CPTII,S$GLB, | Performed by: INTERNAL MEDICINE

## 2020-09-03 PROCEDURE — 3008F PR BODY MASS INDEX (BMI) DOCUMENTED: ICD-10-PCS | Mod: HCNC,CPTII,S$GLB, | Performed by: INTERNAL MEDICINE

## 2020-09-03 PROCEDURE — 3075F SYST BP GE 130 - 139MM HG: CPT | Mod: HCNC,CPTII,S$GLB, | Performed by: INTERNAL MEDICINE

## 2020-09-03 PROCEDURE — 1125F AMNT PAIN NOTED PAIN PRSNT: CPT | Mod: HCNC,S$GLB,, | Performed by: INTERNAL MEDICINE

## 2020-09-03 PROCEDURE — 1159F PR MEDICATION LIST DOCUMENTED IN MEDICAL RECORD: ICD-10-PCS | Mod: HCNC,S$GLB,, | Performed by: INTERNAL MEDICINE

## 2020-09-03 PROCEDURE — 1126F AMNT PAIN NOTED NONE PRSNT: CPT | Mod: HCNC,S$GLB,, | Performed by: INTERNAL MEDICINE

## 2020-09-03 PROCEDURE — 99999 PR PBB SHADOW E&M-EST. PATIENT-LVL V: CPT | Mod: PBBFAC,HCNC,, | Performed by: INTERNAL MEDICINE

## 2020-09-03 PROCEDURE — 3078F DIAST BP <80 MM HG: CPT | Mod: HCNC,CPTII,S$GLB, | Performed by: INTERNAL MEDICINE

## 2020-09-03 PROCEDURE — 99497 PR ADVNCD CARE PLAN 30 MIN: ICD-10-PCS | Mod: HCNC,S$GLB,, | Performed by: INTERNAL MEDICINE

## 2020-09-03 PROCEDURE — 2024F 7 FLD RTA PHOTO EVC RTNOPTHY: CPT | Mod: HCNC,S$GLB,, | Performed by: INTERNAL MEDICINE

## 2020-09-03 PROCEDURE — 1125F PR PAIN SEVERITY QUANTIFIED, PAIN PRESENT: ICD-10-PCS | Mod: HCNC,S$GLB,, | Performed by: INTERNAL MEDICINE

## 2020-09-03 PROCEDURE — 99214 OFFICE O/P EST MOD 30 MIN: CPT | Mod: HCNC,S$GLB,, | Performed by: INTERNAL MEDICINE

## 2020-09-03 PROCEDURE — 3078F PR MOST RECENT DIASTOLIC BLOOD PRESSURE < 80 MM HG: ICD-10-PCS | Mod: HCNC,CPTII,S$GLB, | Performed by: INTERNAL MEDICINE

## 2020-09-03 PROCEDURE — 1159F MED LIST DOCD IN RCRD: CPT | Mod: HCNC,S$GLB,, | Performed by: INTERNAL MEDICINE

## 2020-09-03 PROCEDURE — 3288F FALL RISK ASSESSMENT DOCD: CPT | Mod: HCNC,CPTII,S$GLB, | Performed by: INTERNAL MEDICINE

## 2020-09-03 PROCEDURE — 3288F PR FALLS RISK ASSESSMENT DOCUMENTED: ICD-10-PCS | Mod: HCNC,CPTII,S$GLB, | Performed by: INTERNAL MEDICINE

## 2020-09-03 PROCEDURE — 3008F BODY MASS INDEX DOCD: CPT | Mod: HCNC,CPTII,S$GLB, | Performed by: INTERNAL MEDICINE

## 2020-09-03 RX ORDER — BUTALBITAL, ACETAMINOPHEN AND CAFFEINE 50; 325; 40 MG/1; MG/1; MG/1
1 TABLET ORAL EVERY 8 HOURS PRN
Qty: 60 TABLET | Refills: 0 | Status: SHIPPED | OUTPATIENT
Start: 2020-09-03 | End: 2020-10-03

## 2020-09-03 NOTE — LETTER
September 3, 2020      Constantine Bird MD  [address]  [place] [address]           [facility]  [address]  [place] [address]  Phone: [phone]          Patient: Nelly Tian   MR Number: [id number]   YOB: 1950   Date of Visit: 9/3/2020       Dear Dr. Constantine Bird:    Thank you for referring Nelly Tian to me for evaluation. Attached you will find relevant portions of my assessment and plan of care.    If you have questions, please do not hesitate to call me. I look forward to following Nelly Tian along with you.    Sincerely,    Laura Ramos MD    Enclosure  CC:  No Recipients    If you would like to receive this communication electronically, please contact [email or web address] or [phone] to request more information on [employer] Link access.    For providers and/or their staff who would like to refer a patient to [facility], please contact us through our one-stop-shop provider referral line, [facility] , at [phone].    If you feel you have received this communication in error or would no longer like to receive these types of communications, please e-mail [email or web address]

## 2020-09-03 NOTE — PROGRESS NOTES
CHIEF COMPLAINT:   I am having headaches    Ms. Tian is a 69 -year-old female who has a history of progressive anemia after  IV iron.    Here today for routine labs for anemia. Post IV iron in the past    She is tolerating Glucophage for diabetes as well as Lexapro for depression and Zestril  for hypertension  Trazodone not helping her sleep   on eliquis for a fib   She is scheduled to have gastric bypass soon   Post IV iron      Past Medical History:   Diagnosis Date    *Atrial flutter     Angina pectoris 9/18/2017    Anxiety     Arthritis     Asthma     Atrial fibrillation     Back pain     Cataract     OD    CHF (congestive heart failure)     COPD (chronic obstructive pulmonary disease)     Depression     Diabetes mellitus     Emphysema of lung     Heart failure     Hepatomegaly 2/3/2016    Hernia     History of MI (myocardial infarction) 1/19/2016    Hypercapnic respiratory failure, chronic 11/16/2016    Hyperlipidemia     Hypertension     Iron deficiency anemia 2/3/2016    Myocardial infarction     Obesity     Peripheral vascular disease     Pneumonia     Polyneuropathy     Retinal detachment     OS    Septic shock 4/23/2017    Skin ulcer 3/18/2017    Tobacco dependence     Type II 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, not stated as uncontrolled(250.60)    tolerating lipitor for dysipidemia and neurontin for paresthesias      Current Outpatient Medications:     albuterol (ACCUNEB) 0.63 mg/3 mL Nebu, Take 3 mLs (0.63 mg total) by nebulization every 6 (six) hours as needed. Rescue, Disp: 75 mL, Rfl: 11    albuterol (PROVENTIL/VENTOLIN HFA) 90 mcg/actuation inhaler, INHALE 2 PUFFS INTO THE LUNGS EVERY 6 HOURS AS NEEDED FOR RESCUE, Disp: 54 g, Rfl: 3    amoxicillin-clavulanate 875-125mg (AUGMENTIN) 875-125 mg per tablet, Take 1 tablet by mouth 2 (two) times daily., Disp: 20 tablet, Rfl: 0    ascorbic acid, vitamin C, (VITAMIN C) 500 MG tablet,  Take 2 tablets (1,000 mg total) by mouth every evening., Disp: , Rfl:     atorvastatin (LIPITOR) 20 MG tablet, Take 1 tablet (20 mg total) by mouth once daily., Disp: 90 tablet, Rfl: 3    blood-glucose meter (TRUE METRIX AIR GLUCOSE METER) kit, AC meals. Insulin dependent.Dispence strips and lancets 3 months., Disp: 1 each, Rfl: 0    BREO ELLIPTA 100-25 mcg/dose diskus inhaler, INHALE 1 PUFF INTO THE LUNGS ONCE DAILY., Disp: 60 each, Rfl: 4    clonazePAM (KLONOPIN) 0.5 MG tablet, Take 1 tablet (0.5 mg total) by mouth 2 (two) times daily. TAKE ONE TABLET BY MOUTH TWO TIMES A DAY AS NEEDED. Medically necessary, Disp: 60 tablet, Rfl: 1    doxycycline (VIBRA-TABS) 100 MG tablet, Take 1 tablet (100 mg total) by mouth 2 (two) times daily., Disp: 20 tablet, Rfl: 1    ELIQUIS 5 mg Tab, TAKE 1 TABLET TWICE DAILY, Disp: 180 tablet, Rfl: 0    ferrous sulfate (FEOSOL) 325 mg (65 mg iron) Tab tablet, Take 1 tablet (325 mg total) by mouth once daily., Disp: 60 tablet, Rfl: 3    fluticasone propionate (FLONASE) 50 mcg/actuation nasal spray, 1 spray (50 mcg total) by Each Nostril route once daily., Disp: 1 g, Rfl: 3    furosemide (LASIX) 40 MG tablet, Take 0.5 tablets (20 mg total) by mouth 3 (three) times a week., Disp: 6 tablet, Rfl: 2    gabapentin (NEURONTIN) 800 MG tablet, Take 1 tablet (800 mg total) by mouth 3 (three) times daily., Disp: 270 tablet, Rfl: 3    HYDROcodone-acetaminophen (NORCO)  mg per tablet, Take 1 tablet by mouth every 12 (twelve) hours as needed for Pain. Medical necessary, Disp: 60 tablet, Rfl: 0    lancets Misc, To check BG 3 times daily, to use with insurance preferred meter., Disp: 300 each, Rfl: 11    levalbuterol (XOPENEX) 0.63 mg/3 mL nebulizer solution, INHALE THE CONTENTS OF 1 VIAL BY NEBULIZATION 4 times  DAILY.    DX: J43.9, Disp: 792 mL, Rfl: 0    lisinopriL (PRINIVIL,ZESTRIL) 20 MG tablet, Take 1 tablet (20 mg total) by mouth 2 (two) times daily., Disp: 90 tablet, Rfl:  11    metFORMIN (GLUCOPHAGE) 500 MG tablet, Take 1 tablet (500 mg total) by mouth daily with breakfast., Disp: 90 tablet, Rfl: 3    multivitamin (THERAGRAN) tablet, Take 1 tablet by mouth once daily., Disp: , Rfl:     nystatin (NYSTOP) powder, Apply topically 2 (two) times daily., Disp: 120 g, Rfl: 11    nystatin-triamcinolone (MYCOLOG II) cream, Apply topically 3 (three) times daily., Disp: 30 g, Rfl: 1    omega-3 acid ethyl esters (LOVAZA) 1 gram capsule, 2 (two) times daily. , Disp: , Rfl:     omeprazole (PRILOSEC) 20 MG capsule, TAKE ONE CAPSULE BY MOUTH EVERY DAY, Disp: 30 capsule, Rfl: 11    omeprazole (PRILOSEC) 20 MG capsule, Take 1 capsule (20 mg total) by mouth 2 (two) times daily before meals., Disp: 180 capsule, Rfl: 3    ondansetron (ZOFRAN-ODT) 8 MG TbDL, DISSOLVE 1 TABLET ON THE TONGUE EVERY 8 HOURS, Disp: 270 tablet, Rfl: 3    polyethylene glycol (GLYCOLAX) 17 gram/dose powder, , Disp: , Rfl:     potassium chloride SA (K-DUR,KLOR-CON) 20 MEQ tablet, Take 1 tablet (20 mEq total) by mouth once daily., Disp: 30 tablet, Rfl: 11    senna-docusate 8.6-50 mg (PERICOLACE) 8.6-50 mg per tablet, Take 1 tablet by mouth 2 (two) times daily as needed for Constipation., Disp: , Rfl:     traZODone (DESYREL) 50 MG tablet, Take 1.5 tablets (75 mg total) by mouth every evening., Disp: 135 tablet, Rfl: 3    TRUE METRIX GLUCOSE TEST STRIP Strp, TEST BLOOD SUGAR THREE TIMES A DAY , Disp: 300 strip, Rfl: 3    loratadine (CLARITIN) 10 mg tablet, Take 1 tablet (10 mg total) by mouth once daily., Disp: , Rfl: 0  No current facility-administered medications for this visit.     Facility-Administered Medications Ordered in Other Visits:     sodium hyaluronate (orthovisc) Syrg 15 mg, 15 mg, , , Mehran Carrasco MD, 15 mg at 02/07/20 1000    sodium hyaluronate (orthovisc) Syrg 15 mg, 15 mg, , , Mehran Carrasco MD, 15 mg at 02/07/20 1000    Tolerating zofran for nausea and glucophage for DM     CONSTITUTIONAL:  "No fevers, chills, night sweats, wt. loss,  + weight gain and appetite changes  SKIN: no rashes or itching  ENT: No headaches, head trauma, vision changes, or eye pain  LYMPH NODES: None noticed   Endocrine no excessive thirst   CV: No chest pain, palpitations.   RESP: No dyspnea on exertion, cough, wheezing, or hemoptysis  GI: No nausea, emesis, diarrhea, constipation, melena, hematochezia, pain.   : No dysuria, hematuria, urgency, or frequency   HEME ++  easy bruising,no  bleeding problems  PSYCHIATRIC: No depression, anxiety, psychosis, hallucinations.  NEURO: No seizures, memory loss, dizziness   + headaches   MSK: No arthralgias or joint swelling        PHYSICAL EXAMINATION:    /63 (BP Location: Right arm, Patient Position: Sitting, BP Method: Medium (Automatic))   Pulse 60   Ht 5' 8" (1.727 m)   Wt 96 kg (211 lb 10.3 oz)   LMP  (LMP Unknown)   SpO2 95%   BMI 32.18 kg/m²     GENERAL:  She is obese.  pale and exhausted   PSYCH:  Flat  affect.  No anxiety or depression.  HEENT:  Normocephalic.  Lids intact, conjunctivae pale     OP clear,  No palatal pallor.  No palpable thyromegaly   NECK:  Supple.  Trachea midline.  No palpable abnormalities.  CHEST: CLEAR  BS, no  Fremitus dull to percussion   CV S1S2 with RRR no loud S1 or S2  No PMI   ABDOMEN:  NT, ND.  Normal bowel sounds.  No palpable HSM or mass.  EXTREMITIES:  No limited ROM  No edema   NEUROLOGICAL:  Patient is ambulating well with walker     SKIN:  Warm, dry.  Ecchymosis evident.  No tenting, petechiae  Areas of hypopigmentation      2mo ago  Iron30 - 160 ug/dL27 Abnormally low  33 [id number] - 375 mg/dL294 301 IMOZ573 - 450 ug/dL435 445 Saturated Iron20 - 50 %6 Abnormally low  7 Abnormally low   Uadhz0pg ago  IgG 1382 - 929 mg/dL605 IgG 2242 - 700 mg/xE199Hdr  IgG 322 - 176 mg/dL76 IgG 44 - 86 mg/dL72 Comment: Test performed at [facility]     Lab Results   Component Value Date    WBC 10.86 09/01/2020    HGB 13.9 09/01/2020    HCT 46.1 " 09/01/2020    MCV 90 09/01/2020     09/01/2020                  Ferritin 20.0 - 300.0 ng/mL 18Low   20  20  29  54  16Low   351       CMP  Sodium   Date Value Ref Range Status   07/24/2020 140 136 - 145 mmol/L Final     Potassium   Date Value Ref Range Status   07/24/2020 4.6 3.5 - 5.1 mmol/L Final     Chloride   Date Value Ref Range Status   07/24/2020 105 95 - 110 mmol/L Final     CO2   Date Value Ref Range Status   07/24/2020 24 23 - 29 mmol/L Final     Glucose   Date Value Ref Range Status   07/24/2020 64 (L) 70 - 110 mg/dL Final     BUN, Bld   Date Value Ref Range Status   07/24/2020 17 8 - 23 mg/dL Final     Creatinine   Date Value Ref Range Status   07/24/2020 0.6 0.5 - 1.4 mg/dL Final     Calcium   Date Value Ref Range Status   07/24/2020 9.4 8.7 - 10.5 mg/dL Final     Total Protein   Date Value Ref Range Status   07/24/2020 7.5 6.0 - 8.4 g/dL Final     Albumin   Date Value Ref Range Status   07/24/2020 3.9 3.5 - 5.2 g/dL Final     Total Bilirubin   Date Value Ref Range Status   07/24/2020 0.3 0.1 - 1.0 mg/dL Final     Comment:     For infants and newborns, interpretation of results should be based  on gestational age, weight and in agreement with clinical  observations.  Premature Infant recommended reference ranges:  Up to 24 hours.............<8.0 mg/dL  Up to 48 hours............<12.0 mg/dL  3-5 days..................<15.0 mg/dL  6-29 days.................<15.0 mg/dL       Alkaline Phosphatase   Date Value Ref Range Status   07/24/2020 117 55 - 135 U/L Final     AST   Date Value Ref Range Status   07/24/2020 25 10 - 40 U/L Final     ALT   Date Value Ref Range Status   07/24/2020 20 10 - 44 U/L Final     Anion Gap   Date Value Ref Range Status   07/24/2020 11 8 - 16 mmol/L Final     eGFR if    Date Value Ref Range Status   07/24/2020 >60 >60 mL/min/1.73 m^2 Final     eGFR if non    Date Value Ref Range Status   07/24/2020 >60 >60 mL/min/1.73 m^2 Final     Comment:      Calculation used to obtain the estimated glomerular filtration  rate (eGFR) is the CKD-EPI equation.          Problem List Items Addressed This Visit        Oncology    Iron deficiency anemia due to sideropenic dysphagia    Relevant Orders    CBC auto differential    CMP    Iron and TIBC       Endocrine    Type 2 diabetes mellitus with diabetic neuropathy, without long-term current use of insulin       GI    Hepatomegaly      Other Visit Diagnoses     Cluster headache, not intractable, unspecified chronicity pattern    -  Primary    Relevant Medications    butalbital-acetaminophen-caffeine -40 mg (FIORICET, ESGIC) -40 mg per tablet    Paroxysmal atrial fibrillation        On statin therapy        Anticoagulated                 for headaches she has responded to fioricet in the past : will give her a script for such   paroxysmal A fib : she is stable with eliquis to prevent thrombotic event   Nice response to Iv iron   SHe does have low IGG2 hence explained if she ever develops an infection that does not respond to the antibiotics she may need IVIG   Cont lipitor to prevent plaques monitored by pcp  Cont lasix to help with edema for BP monitored by pcp  Discussed diet and weight control   She is to continue eliquis for chronic atrial fibrillation as well as her diabetic medication to help control hyperglycemia due to diabetes  RTC  3 months with labs to monitor for anemia while on anticoagulation   Watch for bleeding       Advance Care Planning     Living Will  During this visit, I engaged the patient  in the advance care planning process.  The patient and I reviewed the role for advance directives and their purpose in directing future healthcare if the patient's unable to speak for him/herself.  At this point in time, the patient does have full decision-making capacity.  We discussed different extreme health states that she could experience, and reviewed what kind of medical care she would want in those  situations.  The patient communicated that if she were comatose and had little chance of a meaningful recovery, she would not want machines/life-sustaining treatments used. I spent a total of  30  minutes engaging the patient in this advance care planning discussion.

## 2020-09-04 ENCOUNTER — OFFICE VISIT (OUTPATIENT)
Dept: ORTHOPEDICS | Facility: CLINIC | Age: 70
End: 2020-09-04
Payer: MEDICARE

## 2020-09-04 VITALS — RESPIRATION RATE: 17 BRPM

## 2020-09-04 DIAGNOSIS — M17.0 PRIMARY OSTEOARTHRITIS OF KNEES, BILATERAL: Primary | ICD-10-CM

## 2020-09-04 PROCEDURE — 99499 UNLISTED E&M SERVICE: CPT | Mod: HCNC,S$GLB,, | Performed by: ORTHOPAEDIC SURGERY

## 2020-09-04 PROCEDURE — 20610 LARGE JOINT ASPIRATION/INJECTION: BILATERAL KNEE: ICD-10-PCS | Mod: 50,HCNC,S$GLB, | Performed by: ORTHOPAEDIC SURGERY

## 2020-09-04 PROCEDURE — 99999 PR PBB SHADOW E&M-EST. PATIENT-LVL II: CPT | Mod: PBBFAC,HCNC,, | Performed by: ORTHOPAEDIC SURGERY

## 2020-09-04 PROCEDURE — 20610 DRAIN/INJ JOINT/BURSA W/O US: CPT | Mod: 50,HCNC,S$GLB, | Performed by: ORTHOPAEDIC SURGERY

## 2020-09-04 PROCEDURE — 99999 PR PBB SHADOW E&M-EST. PATIENT-LVL II: ICD-10-PCS | Mod: PBBFAC,HCNC,, | Performed by: ORTHOPAEDIC SURGERY

## 2020-09-04 PROCEDURE — 99499 NO LOS: ICD-10-PCS | Mod: HCNC,S$GLB,, | Performed by: ORTHOPAEDIC SURGERY

## 2020-09-04 NOTE — PROCEDURES
Large Joint Aspiration/Injection: bilateral knee    Date/Time: 9/4/2020 10:15 AM  Performed by: Mehran Carrasco MD  Authorized by: Mehran Carrasco MD     Consent Done?:  Yes (Verbal)  Indications:  Pain  Timeout: prior to procedure the correct patient, procedure, and site was verified    Approach:  Anteromedial  Location:  Knee  Laterality:  Bilateral  Site:  Bilateral knee  Medications (Right):  30 mg sodium hyaluronate (orthovisc) 30 mg/2 mL  Medications (Left):  30 mg sodium hyaluronate (orthovisc) 30 mg/2 mL

## 2020-09-04 NOTE — PROGRESS NOTES
Past Medical History:   Diagnosis Date    *Atrial flutter     Angina pectoris 2017    Anxiety     Arthritis     Asthma     Atrial fibrillation     Back pain     Cataract     OD    CHF (congestive heart failure)     COPD (chronic obstructive pulmonary disease)     Depression     Diabetes mellitus     Emphysema of lung     Heart failure     Hepatomegaly 2/3/2016    Hernia     History of MI (myocardial infarction) 2016    Hypercapnic respiratory failure, chronic 2016    Hyperlipidemia     Hypertension     Iron deficiency anemia 2/3/2016    Myocardial infarction     Obesity     Peripheral vascular disease     Pneumonia     Polyneuropathy     Retinal detachment     OS    Septic shock 2017    Skin ulcer 3/18/2017    Tobacco dependence     Type II 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, not stated as uncontrolled(250.60)        Past Surgical History:   Procedure Laterality Date    BREAST BIOPSY Left 10 yrs ago    benign    CATARACT EXTRACTION Left     OS      SECTION      x2    EYE SURGERY      HYSTERECTOMY      partial    OOPHORECTOMY      one ovary conserved    RETINAL DETACHMENT SURGERY      buckle --OS    TONSILLECTOMY         Current Outpatient Medications   Medication Sig    albuterol (ACCUNEB) 0.63 mg/3 mL Nebu Take 3 mLs (0.63 mg total) by nebulization every 6 (six) hours as needed. Rescue    albuterol (PROVENTIL/VENTOLIN HFA) 90 mcg/actuation inhaler INHALE 2 PUFFS INTO THE LUNGS EVERY 6 HOURS AS NEEDED FOR RESCUE    amoxicillin-clavulanate 875-125mg (AUGMENTIN) 875-125 mg per tablet Take 1 tablet by mouth 2 (two) times daily.    ascorbic acid, vitamin C, (VITAMIN C) 500 MG tablet Take 2 tablets (1,000 mg total) by mouth every evening.    atorvastatin (LIPITOR) 20 MG tablet Take 1 tablet (20 mg total) by mouth once daily.    blood-glucose meter (TRUE METRIX AIR GLUCOSE METER) kit AC meals. Insulin  dependent.Dispence strips and lancets 3 months.    BREO ELLIPTA 100-25 mcg/dose diskus inhaler INHALE 1 PUFF INTO THE LUNGS ONCE DAILY.    butalbital-acetaminophen-caffeine -40 mg (FIORICET, ESGIC) -40 mg per tablet Take 1 tablet by mouth every 8 (eight) hours as needed for Pain.    clonazePAM (KLONOPIN) 0.5 MG tablet Take 1 tablet (0.5 mg total) by mouth 2 (two) times daily. TAKE ONE TABLET BY MOUTH TWO TIMES A DAY AS NEEDED. Medically necessary    doxycycline (VIBRA-TABS) 100 MG tablet Take 1 tablet (100 mg total) by mouth 2 (two) times daily.    ELIQUIS 5 mg Tab TAKE 1 TABLET TWICE DAILY    ferrous sulfate (FEOSOL) 325 mg (65 mg iron) Tab tablet Take 1 tablet (325 mg total) by mouth once daily.    fluticasone propionate (FLONASE) 50 mcg/actuation nasal spray 1 spray (50 mcg total) by Each Nostril route once daily.    furosemide (LASIX) 40 MG tablet Take 0.5 tablets (20 mg total) by mouth 3 (three) times a week.    gabapentin (NEURONTIN) 800 MG tablet Take 1 tablet (800 mg total) by mouth 3 (three) times daily.    HYDROcodone-acetaminophen (NORCO)  mg per tablet Take 1 tablet by mouth every 12 (twelve) hours as needed for Pain. Medical necessary    lancets Misc To check BG 3 times daily, to use with insurance preferred meter.    levalbuterol (XOPENEX) 0.63 mg/3 mL nebulizer solution INHALE THE CONTENTS OF 1 VIAL BY NEBULIZATION 4 times  DAILY.    DX: J43.9    lisinopriL (PRINIVIL,ZESTRIL) 20 MG tablet Take 1 tablet (20 mg total) by mouth 2 (two) times daily.    loratadine (CLARITIN) 10 mg tablet Take 1 tablet (10 mg total) by mouth once daily.    metFORMIN (GLUCOPHAGE) 500 MG tablet Take 1 tablet (500 mg total) by mouth daily with breakfast.    multivitamin (THERAGRAN) tablet Take 1 tablet by mouth once daily.    nystatin (NYSTOP) powder Apply topically 2 (two) times daily.    nystatin-triamcinolone (MYCOLOG II) cream Apply topically 3 (three) times daily.    omega-3 acid ethyl  esters (LOVAZA) 1 gram capsule 2 (two) times daily.     omeprazole (PRILOSEC) 20 MG capsule TAKE ONE CAPSULE BY MOUTH EVERY DAY    omeprazole (PRILOSEC) 20 MG capsule Take 1 capsule (20 mg total) by mouth 2 (two) times daily before meals.    ondansetron (ZOFRAN-ODT) 8 MG TbDL DISSOLVE 1 TABLET ON THE TONGUE EVERY 8 HOURS    polyethylene glycol (GLYCOLAX) 17 gram/dose powder     potassium chloride SA (K-DUR,KLOR-CON) 20 MEQ tablet Take 1 tablet (20 mEq total) by mouth once daily.    senna-docusate 8.6-50 mg (PERICOLACE) 8.6-50 mg per tablet Take 1 tablet by mouth 2 (two) times daily as needed for Constipation.    traZODone (DESYREL) 50 MG tablet Take 1.5 tablets (75 mg total) by mouth every evening.    TRUE METRIX GLUCOSE TEST STRIP Strp TEST BLOOD SUGAR THREE TIMES A DAY      No current facility-administered medications for this visit.      Facility-Administered Medications Ordered in Other Visits   Medication    sodium hyaluronate (orthovisc) Syrg 15 mg    sodium hyaluronate (orthovisc) Syrg 15 mg       Allergies   Allergen Reactions    Dilaudid [Hydromorphone] Anaphylaxis     Other reaction(s): Anaphylaxis  Other reaction(s): Unknown       Family History   Problem Relation Age of Onset    Arthritis Father     Heart disease Father     Early death Sister 30        heart disease    Heart disease Sister 32        MI    Cancer Brother         Lung cancer    No Known Problems Daughter     Blindness Son         due to accident//    Arthritis Sister     Heart disease Sister     Crohn's disease Sister     Alcohol abuse Mother     Breast cancer Neg Hx     Glaucoma Neg Hx     Macular degeneration Neg Hx     Retinal detachment Neg Hx     Amblyopia Neg Hx     Diabetes Neg Hx     Cataracts Neg Hx     Hypertension Neg Hx     Strabismus Neg Hx     Stroke Neg Hx     Thyroid disease Neg Hx        Social History     Socioeconomic History    Marital status:      Spouse name: Not on file     Number of children: Not on file    Years of education: Not on file    Highest education level: Not on file   Occupational History    Not on file   Social Needs    Financial resource strain: Not on file    Food insecurity     Worry: Not on file     Inability: Not on file    Transportation needs     Medical: Not on file     Non-medical: Not on file   Tobacco Use    Smoking status: Current Every Day Smoker     Packs/day: 0.25     Years: 50.00     Pack years: 12.50     Types: Cigarettes     Start date: 1968     Last attempt to quit: 2019     Years since quittin.0    Smokeless tobacco: Never Used   Substance and Sexual Activity    Alcohol use: No     Alcohol/week: 0.0 standard drinks     Frequency: Never    Drug use: No    Sexual activity: Not Currently   Lifestyle    Physical activity     Days per week: Not on file     Minutes per session: Not on file    Stress: Not on file   Relationships    Social connections     Talks on phone: Not on file     Gets together: Not on file     Attends [employer] service: Not on file     Active member of club or organization: Not on file     Attends meetings of clubs or organizations: Not on file     Relationship status: Not on file   Other Topics Concern    Not on file   Social History Narrative    Not on file       Chief Complaint:   Chief Complaint   Patient presents with    Knee Pain     BILATERAL ORTHOVISC 2/3       Consulting Physician: No ref. provider found    History of present illness: This is a 64-year-old young lady who reports the return of bilateral knee pain.  Pain today 6/10. No new injury. Previous injections helped.    Review of Systems:    Constitution: Denies chills, fever, and sweats.    HENT: Denies headaches. Reports blurry vision.    Cardiovascular: Denies chest pain or irregular heart beat.    Respiratory: Denies cough. Reports shortness of breath.    Gastrointestinal: Denies abdominal pain, nausea, or vomiting.    Musculoskeletal:   Denies muscle cramps.    Neurological: Denies dizziness or focal weakness.    Psychiatric/Behavioral: Normal mental status. Anxiety.    Hematologic/Lymphatic: Denies bleeding problem or easy bruising/bleeding.    Skin: Denies rash or suspicious lesions.      Examination:    Vital Signs:    Vitals:    09/04/20 0941   Resp: 17       There is no height or weight on file to calculate BMI.    This a well-developed, well nourished patient in no acute distress.    Alert and oriented and cooperative to examination.       Physical Exam: Left Knee Exam    Gait   antalgic    Skin  Rash:   None  Scars:   None    Inspection  Erythema:  None  Ecchymosis:  None  Effusion:  Mild  Masses:  None  Lymphadenopathy: None    Range of Motion: Full - 0 to 130°    Medial Joint : Mild  Lateral Joint : Mild    Patellofemoral Tenderness: None  Patellofemoral Crepitus: None    Lachman:  Normal  Anterior Drawer: Normal  Posterior Drawer: Normal    Dilia's:  Negative  Apley's:  Negative    Varus Stress:  Stable  Valgus Stress: Stable    Strength:  5/5    Pulses:  Palpable distal    Sensation:  Intact      Right Knee Exam    Gait:   Antalgic    Skin  Rash:   None  Scars:   None    Inspection  Erythema:  None  Ecchymosis:  None  Effusion:  Mild  Masses:  None  Lymphadenopathy: None    Range of Motion: Full - 0 to 130°    Medial Joint : Mild  Lateral Joint : Mild    Patellofemoral Tenderness: None  Patellofemoral Crepitus: None    Lachman:  Normal  Anterior Drawer: Normal  Posterior Drawer: Normal    Dilia's:  Negative  Apley's:  Negative    Varus Stress:  Stable  Valgus Stress: Stable    Strength:  5/5    Pulses:  Palpable distal    Sensation:  Intact          Imaging: X-rays done before of both knees reveals degenerative changes that are severe bilateral.     Assessment: Primary osteoarthritis of knees, bilateral  -     Large Joint Aspiration/Injection: bilateral knee        Plan:   Euflexxa  series.    DISCLAIMER: This note may have been dictated using voice recognition software and may contain grammatical errors. Report sent to referring provider as required.

## 2020-09-10 ENCOUNTER — OFFICE VISIT (OUTPATIENT)
Dept: FAMILY MEDICINE | Facility: CLINIC | Age: 70
End: 2020-09-10
Payer: MEDICARE

## 2020-09-10 VITALS
HEIGHT: 68 IN | TEMPERATURE: 98 F | WEIGHT: 211.88 LBS | HEART RATE: 64 BPM | DIASTOLIC BLOOD PRESSURE: 62 MMHG | SYSTOLIC BLOOD PRESSURE: 130 MMHG | OXYGEN SATURATION: 93 % | RESPIRATION RATE: 12 BRPM | BODY MASS INDEX: 32.11 KG/M2

## 2020-09-10 DIAGNOSIS — M43.12 SPONDYLOLISTHESIS OF CERVICAL REGION: ICD-10-CM

## 2020-09-10 DIAGNOSIS — Z79.1 NSAID LONG-TERM USE: ICD-10-CM

## 2020-09-10 DIAGNOSIS — E11.40 TYPE 2 DIABETES MELLITUS WITH DIABETIC NEUROPATHY, WITHOUT LONG-TERM CURRENT USE OF INSULIN: Primary | ICD-10-CM
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
## 2020-09-10 DIAGNOSIS — J42 CHRONIC BRONCHITIS, UNSPECIFIED CHRONIC BRONCHITIS TYPE: ICD-10-CM

## 2020-09-10 DIAGNOSIS — K31.83 ACHLORHYDRIA: ICD-10-CM

## 2020-09-10 DIAGNOSIS — M51.36 LUMBAR DEGENERATIVE DISC DISEASE: ICD-10-CM

## 2020-09-10 DIAGNOSIS — K21.9 GASTROESOPHAGEAL REFLUX DISEASE, ESOPHAGITIS PRESENCE NOT SPECIFIED: ICD-10-CM

## 2020-09-10 DIAGNOSIS — K91.89 COMPLICATIONS OF GASTRIC BYPASS SURGERY: ICD-10-CM

## 2020-09-10 DIAGNOSIS — F17.200 TOBACCO DEPENDENCY: ICD-10-CM

## 2020-09-10 PROBLEM — S31.109A WOUND, OPEN, ABDOMINAL WALL, LATERAL: Status: RESOLVED | Noted: 2019-01-20 | Resolved: 2020-09-10

## 2020-09-10 PROBLEM — L03.311 CELLULITIS OF ABDOMINAL WALL: Status: RESOLVED | Noted: 2019-05-04 | Resolved: 2020-09-10

## 2020-09-10 PROCEDURE — 3008F BODY MASS INDEX DOCD: CPT | Mod: HCNC,CPTII,S$GLB, | Performed by: FAMILY MEDICINE

## 2020-09-10 PROCEDURE — 99213 OFFICE O/P EST LOW 20 MIN: CPT | Mod: HCNC,S$GLB,, | Performed by: FAMILY MEDICINE

## 2020-09-10 PROCEDURE — 3078F PR MOST RECENT DIASTOLIC BLOOD PRESSURE < 80 MM HG: ICD-10-PCS | Mod: HCNC,CPTII,S$GLB, | Performed by: FAMILY MEDICINE

## 2020-09-10 PROCEDURE — 1159F MED LIST DOCD IN RCRD: CPT | Mod: HCNC,S$GLB,, | Performed by: FAMILY MEDICINE

## 2020-09-10 PROCEDURE — 1101F PR PT FALLS ASSESS DOC 0-1 FALLS W/OUT INJ PAST YR: ICD-10-PCS | Mod: HCNC,CPTII,S$GLB, | Performed by: FAMILY MEDICINE

## 2020-09-10 PROCEDURE — 99999 PR PBB SHADOW E&M-EST. PATIENT-LVL IV: ICD-10-PCS | Mod: PBBFAC,HCNC,, | Performed by: FAMILY MEDICINE

## 2020-09-10 PROCEDURE — 2024F 7 FLD RTA PHOTO EVC RTNOPTHY: CPT | Mod: HCNC,S$GLB,, | Performed by: FAMILY MEDICINE

## 2020-09-10 PROCEDURE — 3075F SYST BP GE 130 - 139MM HG: CPT | Mod: HCNC,CPTII,S$GLB, | Performed by: FAMILY MEDICINE

## 2020-09-10 PROCEDURE — 99213 PR OFFICE/OUTPT VISIT, EST, LEVL III, 20-29 MIN: ICD-10-PCS | Mod: HCNC,S$GLB,, | Performed by: FAMILY MEDICINE

## 2020-09-10 PROCEDURE — 3078F DIAST BP <80 MM HG: CPT | Mod: HCNC,CPTII,S$GLB, | Performed by: FAMILY MEDICINE

## 2020-09-10 PROCEDURE — 99999 PR PBB SHADOW E&M-EST. PATIENT-LVL IV: CPT | Mod: PBBFAC,HCNC,, | Performed by: FAMILY MEDICINE

## 2020-09-10 PROCEDURE — 1159F PR MEDICATION LIST DOCUMENTED IN MEDICAL RECORD: ICD-10-PCS | Mod: HCNC,S$GLB,, | Performed by: FAMILY MEDICINE

## 2020-09-10 PROCEDURE — 1125F PR PAIN SEVERITY QUANTIFIED, PAIN PRESENT: ICD-10-PCS | Mod: HCNC,S$GLB,, | Performed by: FAMILY MEDICINE

## 2020-09-10 PROCEDURE — 1101F PT FALLS ASSESS-DOCD LE1/YR: CPT | Mod: HCNC,CPTII,S$GLB, | Performed by: FAMILY MEDICINE

## 2020-09-10 PROCEDURE — 1125F AMNT PAIN NOTED PAIN PRSNT: CPT | Mod: HCNC,S$GLB,, | Performed by: FAMILY MEDICINE

## 2020-09-10 PROCEDURE — 3044F PR MOST RECENT HEMOGLOBIN A1C LEVEL <7.0%: ICD-10-PCS | Mod: HCNC,CPTII,S$GLB, | Performed by: FAMILY MEDICINE

## 2020-09-10 PROCEDURE — 2024F PR 7 FIELD PHOTOS WITH INTERP/ REVIEW: ICD-10-PCS | Mod: HCNC,S$GLB,, | Performed by: FAMILY MEDICINE

## 2020-09-10 PROCEDURE — 3044F HG A1C LEVEL LT 7.0%: CPT | Mod: HCNC,CPTII,S$GLB, | Performed by: FAMILY MEDICINE

## 2020-09-10 PROCEDURE — 3008F PR BODY MASS INDEX (BMI) DOCUMENTED: ICD-10-PCS | Mod: HCNC,CPTII,S$GLB, | Performed by: FAMILY MEDICINE

## 2020-09-10 PROCEDURE — 3075F PR MOST RECENT SYSTOLIC BLOOD PRESS GE 130-139MM HG: ICD-10-PCS | Mod: HCNC,CPTII,S$GLB, | Performed by: FAMILY MEDICINE

## 2020-09-10 RX ORDER — OMEPRAZOLE 20 MG/1
20 CAPSULE, DELAYED RELEASE ORAL DAILY
Qty: 90 CAPSULE | Refills: 3 | Status: SHIPPED | OUTPATIENT
Start: 2020-09-10 | End: 2020-12-20 | Stop reason: SDUPTHER

## 2020-09-10 RX ORDER — HYDROCODONE BITARTRATE AND ACETAMINOPHEN 10; 325 MG/1; MG/1
1 TABLET ORAL EVERY 12 HOURS PRN
Qty: 60 TABLET | Refills: 0 | Status: SHIPPED | OUTPATIENT
Start: 2020-10-02 | End: 2020-10-28 | Stop reason: SDUPTHER

## 2020-09-10 NOTE — PATIENT INSTRUCTIONS
Weight Management: Getting Started  Healthy bodies come in all shapes and sizes. Not all bodies are made to be thin. For some people, a healthy weight is higher than the average weight listed on weight charts. Your healthcare provider can help you decide on a healthy weight for you.    Reasons to lose weight  Losing weight can help with some health problems, such as high blood pressure, heart disease, diabetes, sleep apnea, and arthritis. You may also feel more energy.  Set your long-term goal  Your goal doesn't even have to be a specific weight. You may decide on a fitness goal (such as being able to walk 10 miles a week), or a health goal (such as lowering your blood pressure). Choose a goal that is measurable and reasonable, so you know when you've reached it. A goal of reaching a BMI of less than 25 is not always reasonable (or possible).   Make an action plan  Habits dont change overnight. Setting your goals too high can leave you feeling discouraged if you cant reach them. Be realistic. Choose one or two small changes you can make now. Set an action plan for how you are going to make these changes. When you can stick to this plan, keep making a few more small changes. Taking small steps will help you stay on the path to success.  Track your progress  Write down your goals. Then, keep a daily record of your progress. Write down what you eat and how active you are. This record lets you look back on how much youve done. It may also help when youre feeling frustrated. Reward yourself for success. Even if you dont reach every goal, give yourself credit for what you do get done.  Get support  Encouragement from others can help make losing weight easier. Ask your family members and friends for support. They may even want to join you. Also look to your healthcare provider, registered dietitian, and  for help. Your local hospital can give you more information about nutrition, exercise, and  weight loss.  Date Last Reviewed: 1/31/2016  © 9008-4230 [employer]. [address] [place] [address], [place] [address]. All rights reserved. This information is not intended as a substitute for professional medical care. Always follow your healthcare professional's instructions.

## 2020-09-11 ENCOUNTER — OFFICE VISIT (OUTPATIENT)
Dept: ORTHOPEDICS | Facility: CLINIC | Age: 70
End: 2020-09-11
Payer: MEDICARE

## 2020-09-11 VITALS — WEIGHT: 211 LBS | BODY MASS INDEX: 31.98 KG/M2 | TEMPERATURE: 97 F | HEIGHT: 68 IN

## 2020-09-11 DIAGNOSIS — M17.0 PRIMARY OSTEOARTHRITIS OF KNEES, BILATERAL: Primary | ICD-10-CM

## 2020-09-11 PROCEDURE — 20610 LARGE JOINT ASPIRATION/INJECTION: BILATERAL KNEE: ICD-10-PCS | Mod: 50,HCNC,S$GLB, | Performed by: ORTHOPAEDIC SURGERY

## 2020-09-11 PROCEDURE — 20610 DRAIN/INJ JOINT/BURSA W/O US: CPT | Mod: 50,HCNC,S$GLB, | Performed by: ORTHOPAEDIC SURGERY

## 2020-09-11 PROCEDURE — 99499 UNLISTED E&M SERVICE: CPT | Mod: HCNC,S$GLB,, | Performed by: ORTHOPAEDIC SURGERY

## 2020-09-11 PROCEDURE — 99999 PR PBB SHADOW E&M-EST. PATIENT-LVL IV: ICD-10-PCS | Mod: PBBFAC,HCNC,, | Performed by: ORTHOPAEDIC SURGERY

## 2020-09-11 PROCEDURE — 99499 NO LOS: ICD-10-PCS | Mod: HCNC,S$GLB,, | Performed by: ORTHOPAEDIC SURGERY

## 2020-09-11 PROCEDURE — 99999 PR PBB SHADOW E&M-EST. PATIENT-LVL IV: CPT | Mod: PBBFAC,HCNC,, | Performed by: ORTHOPAEDIC SURGERY

## 2020-09-11 NOTE — PROGRESS NOTES
Past Medical History:   Diagnosis Date    *Atrial flutter     Angina pectoris 2017    Anxiety     Arthritis     Asthma     Atrial fibrillation     Back pain     Cataract     OD    CHF (congestive heart failure)     COPD (chronic obstructive pulmonary disease)     Depression     Diabetes mellitus     Emphysema of lung     Heart failure     Hepatomegaly 2/3/2016    Hernia     History of MI (myocardial infarction) 2016    Hypercapnic respiratory failure, chronic 2016    Hyperlipidemia     Hypertension     Iron deficiency anemia 2/3/2016    Myocardial infarction     Obesity     Peripheral vascular disease     Pneumonia     Polyneuropathy     Retinal detachment     OS    Septic shock 2017    Skin ulcer 3/18/2017    Tobacco dependence     Type II 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, not stated as uncontrolled(250.60)        Past Surgical History:   Procedure Laterality Date    BREAST BIOPSY Left 10 yrs ago    benign    CATARACT EXTRACTION Left     OS      SECTION      x2    EYE SURGERY      HYSTERECTOMY      partial    OOPHORECTOMY      one ovary conserved    RETINAL DETACHMENT SURGERY      buckle --OS    TONSILLECTOMY         Current Outpatient Medications   Medication Sig    albuterol (ACCUNEB) 0.63 mg/3 mL Nebu Take 3 mLs (0.63 mg total) by nebulization every 6 (six) hours as needed. Rescue    albuterol (PROVENTIL/VENTOLIN HFA) 90 mcg/actuation inhaler INHALE 2 PUFFS INTO THE LUNGS EVERY 6 HOURS AS NEEDED FOR RESCUE    ascorbic acid, vitamin C, (VITAMIN C) 500 MG tablet Take 2 tablets (1,000 mg total) by mouth every evening.    atorvastatin (LIPITOR) 20 MG tablet Take 1 tablet (20 mg total) by mouth once daily.    blood-glucose meter (TRUE METRIX AIR GLUCOSE METER) kit AC meals. Insulin dependent.Dispence strips and lancets 3 months.    BREO ELLIPTA 100-25 mcg/dose diskus inhaler INHALE 1 PUFF INTO THE LUNGS  ONCE DAILY.    budesonide (PULMICORT) 0.5 mg/2 mL nebulizer solution INHALE THE CONTENTS OF 1 VIAL VIA NEBULIZER EVERY DAY    butalbital-acetaminophen-caffeine -40 mg (FIORICET, ESGIC) -40 mg per tablet Take 1 tablet by mouth every 8 (eight) hours as needed for Pain.    clonazePAM (KLONOPIN) 0.5 MG tablet Take 1 tablet (0.5 mg total) by mouth 2 (two) times daily. TAKE ONE TABLET BY MOUTH TWO TIMES A DAY AS NEEDED. Medically necessary    ELIQUIS 5 mg Tab TAKE 1 TABLET TWICE DAILY    ferrous sulfate (FEOSOL) 325 mg (65 mg iron) Tab tablet Take 1 tablet (325 mg total) by mouth once daily.    fluticasone propionate (FLONASE) 50 mcg/actuation nasal spray 1 spray (50 mcg total) by Each Nostril route once daily.    furosemide (LASIX) 40 MG tablet Take 0.5 tablets (20 mg total) by mouth 3 (three) times a week.    gabapentin (NEURONTIN) 800 MG tablet Take 1 tablet (800 mg total) by mouth 3 (three) times daily.    [START ON 10/2/2020] HYDROcodone-acetaminophen (NORCO)  mg per tablet Take 1 tablet by mouth every 12 (twelve) hours as needed for Pain. Medical necessary    lancets Misc To check BG 3 times daily, to use with insurance preferred meter.    levalbuterol (XOPENEX) 0.63 mg/3 mL nebulizer solution INHALE THE CONTENTS OF 1 VIAL BY NEBULIZATION 4 times  DAILY.    DX: J43.9    lisinopriL (PRINIVIL,ZESTRIL) 20 MG tablet Take 1 tablet (20 mg total) by mouth 2 (two) times daily.    metFORMIN (GLUCOPHAGE) 500 MG tablet Take 1 tablet (500 mg total) by mouth daily with breakfast.    multivitamin (THERAGRAN) tablet Take 1 tablet by mouth once daily.    nystatin (NYSTOP) powder Apply topically 2 (two) times daily.    nystatin-triamcinolone (MYCOLOG II) cream Apply topically 3 (three) times daily.    omega-3 acid ethyl esters (LOVAZA) 1 gram capsule Take 1 g by mouth 2 (two) times daily.     omeprazole (PRILOSEC) 20 MG capsule Take 1 capsule (20 mg total) by mouth once daily.    ondansetron  (ZOFRAN-ODT) 8 MG TbDL DISSOLVE 1 TABLET ON THE TONGUE EVERY 8 HOURS (Patient taking differently: Take 8 mg by mouth as needed. )    polyethylene glycol (GLYCOLAX) 17 gram/dose powder Take 17 g by mouth once daily.     potassium chloride SA (K-DUR,KLOR-CON) 20 MEQ tablet Take 1 tablet (20 mEq total) by mouth once daily.    senna-docusate 8.6-50 mg (PERICOLACE) 8.6-50 mg per tablet Take 1 tablet by mouth 2 (two) times daily as needed for Constipation.    TRUE METRIX GLUCOSE TEST STRIP Strp TEST BLOOD SUGAR THREE TIMES A DAY     loratadine (CLARITIN) 10 mg tablet Take 1 tablet (10 mg total) by mouth once daily.     No current facility-administered medications for this visit.      Facility-Administered Medications Ordered in Other Visits   Medication    sodium hyaluronate (orthovisc) Syrg 15 mg    sodium hyaluronate (orthovisc) Syrg 15 mg       Allergies   Allergen Reactions    Dilaudid [Hydromorphone] Anaphylaxis     Other reaction(s): Anaphylaxis  Other reaction(s): Unknown       Family History   Problem Relation Age of Onset    Arthritis Father     Heart disease Father     Early death Sister 30        heart disease    Heart disease Sister 32        MI    Cancer Brother         Lung cancer    No Known Problems Daughter     Blindness Son         due to accident//    Arthritis Sister     Heart disease Sister     Crohn's disease Sister     Alcohol abuse Mother     Breast cancer Neg Hx     Glaucoma Neg Hx     Macular degeneration Neg Hx     Retinal detachment Neg Hx     Amblyopia Neg Hx     Diabetes Neg Hx     Cataracts Neg Hx     Hypertension Neg Hx     Strabismus Neg Hx     Stroke Neg Hx     Thyroid disease Neg Hx        Social History     Socioeconomic History    Marital status:      Spouse name: Not on file    Number of children: Not on file    Years of education: Not on file    Highest education level: Not on file   Occupational History    Not on file   Social Needs     Financial resource strain: Hard    Food insecurity     Worry: Sometimes true     Inability: Sometimes true    Transportation needs     Medical: Yes     Non-medical: Yes   Tobacco Use    Smoking status: Current Every Day Smoker     Packs/day: 0.30     Years: 50.00     Pack years: 15.00     Types: Cigarettes     Start date: 1968     Last attempt to quit: 2019     Years since quittin.1    Smokeless tobacco: Never Used   Substance and Sexual Activity    Alcohol use: No     Alcohol/week: 0.0 standard drinks     Frequency: Never     Drinks per session: Patient refused     Binge frequency: Patient refused    Drug use: No    Sexual activity: Not Currently   Lifestyle    Physical activity     Days per week: Not on file     Minutes per session: 20 min    Stress: Not on file   Relationships    Social connections     Talks on phone: Once a week     Gets together: Three times a week     Attends L[employer] day service: Not on file     Active member of club or organization: No     Attends meetings of clubs or organizations: 1 to 4 times per year     Relationship status:    Other Topics Concern    Not on file   Social History Narrative    Not on file       Chief Complaint:   Chief Complaint   Patient presents with    Knee Pain     bilateral Orthovisc 3/3       Consulting Physician: No ref. provider found    History of present illness: This is a 64-year-old young lady who reports the return of bilateral knee pain.  Pain today 6/10. No new injury. Previous injections helped.    Review of Systems:    Constitution: Denies chills, fever, and sweats.    HENT: Denies headaches. Reports blurry vision.    Cardiovascular: Denies chest pain or irregular heart beat.    Respiratory: Denies cough. Reports shortness of breath.    Gastrointestinal: Denies abdominal pain, nausea, or vomiting.    Musculoskeletal:  Denies muscle cramps.    Neurological: Denies dizziness or focal weakness.    Psychiatric/Behavioral: Normal  mental status. Anxiety.    Hematologic/Lymphatic: Denies bleeding problem or easy bruising/bleeding.    Skin: Denies rash or suspicious lesions.      Examination:    Vital Signs:    Vitals:    09/11/20 0952   Temp: 97.3 °F (36.3 °C)       Body mass index is 32.08 kg/m².    This a well-developed, well nourished patient in no acute distress.    Alert and oriented and cooperative to examination.       Physical Exam: Left Knee Exam    Gait   antalgic    Skin  Rash:   None  Scars:   None    Inspection  Erythema:  None  Ecchymosis:  None  Effusion:  Mild  Masses:  None  Lymphadenopathy: None    Range of Motion: Full - 0 to 130°    Medial Joint : Mild  Lateral Joint : Mild    Patellofemoral Tenderness: None  Patellofemoral Crepitus: None    Lachman:  Normal  Anterior Drawer: Normal  Posterior Drawer: Normal    Dilia's:  Negative  Apley's:  Negative    Varus Stress:  Stable  Valgus Stress: Stable    Strength:  5/5    Pulses:  Palpable distal    Sensation:  Intact      Right Knee Exam    Gait:   Antalgic    Skin  Rash:   None  Scars:   None    Inspection  Erythema:  None  Ecchymosis:  None  Effusion:  Mild  Masses:  None  Lymphadenopathy: None    Range of Motion: Full - 0 to 130°    Medial Joint : Mild  Lateral Joint : Mild    Patellofemoral Tenderness: None  Patellofemoral Crepitus: None    Lachman:  Normal  Anterior Drawer: Normal  Posterior Drawer: Normal    Dilia's:  Negative  Apley's:  Negative    Varus Stress:  Stable  Valgus Stress: Stable    Strength:  5/5    Pulses:  Palpable distal    Sensation:  Intact          Imaging: X-rays done before of both knees reveals degenerative changes that are severe bilateral.     Assessment: Primary osteoarthritis of knees, bilateral  -     Large Joint Aspiration/Injection: bilateral knee        Plan:   Euflexxa series.    DISCLAIMER: This note may have been dictated using voice recognition software and may contain grammatical  errors. Report sent to referring provider as required.

## 2020-09-11 NOTE — PROCEDURES
Large Joint Aspiration/Injection: bilateral knee    Date/Time: 9/11/2020 10:30 AM  Performed by: Mehran Carrasco MD  Authorized by: Mehran Carrasco MD     Consent Done?:  Yes (Verbal)  Indications:  Pain  Timeout: prior to procedure the correct patient, procedure, and site was verified    Approach:  Anteromedial  Location:  Knee  Laterality:  Bilateral  Site:  Bilateral knee  Medications (Right):  30 mg sodium hyaluronate (orthovisc) 30 mg/2 mL  Medications (Left):  30 mg sodium hyaluronate (orthovisc) 30 mg/2 mL

## 2020-09-21 ENCOUNTER — TELEPHONE (OUTPATIENT)
Dept: BARIATRICS | Facility: CLINIC | Age: 70
End: 2020-09-21

## 2020-09-21 ENCOUNTER — PATIENT MESSAGE (OUTPATIENT)
Dept: BARIATRICS | Facility: CLINIC | Age: 70
End: 2020-09-21

## 2020-09-21 NOTE — TELEPHONE ENCOUNTER
On mobile phone: [phone] told that it was the wrong number  Mobile number with transposed numbers that is listed in chart: [phone], there was no answer, no voicemail  Home number- [phone]- no answer, no voicemail    Attempting to contact patient about bariatric/ comprehensive weight loss appointments. Patient has made more than 18 cancellation in < 1yr, wanting to speak with the patient on how we can accommodate her better as many of her cancellation are due to covid concerns. Unable to leave her voicemail. Will attempt to contact via [employer], along with requesting her to update her phone numbers.

## 2020-09-23 ENCOUNTER — OUTPATIENT CASE MANAGEMENT (OUTPATIENT)
Dept: ADMINISTRATIVE | Facility: OTHER | Age: 70
End: 2020-09-23

## 2020-09-29 ENCOUNTER — PATIENT MESSAGE (OUTPATIENT)
Dept: FAMILY MEDICINE | Facility: CLINIC | Age: 70
End: 2020-09-29

## 2020-09-29 ENCOUNTER — PATIENT MESSAGE (OUTPATIENT)
Dept: OTHER | Facility: OTHER | Age: 70
End: 2020-09-29

## 2020-10-02 NOTE — PROGRESS NOTES
Subjective:       Patient ID: Nelly Tian is a 69 y.o. female.    Chief Complaint: Follow-up    Follow-up  This is a chronic (Diabetes/obesity/hypertension/is asthma) problem. The current episode started more than 1 year ago. The problem has been gradually improving. Associated symptoms include abdominal pain, arthralgias, coughing, neck pain and weakness. Pertinent negatives include no chest pain, chills, fever, headaches, myalgias or rash. The symptoms are aggravated by coughing.   .She also complains of intermittent abdominal pain/hernia at today's visit. Describes fullness, burning after heavier meals, especially if lying down shortly after eating. No dysphagia. She has a huge chronic abdominal tenderness/hernia. This is progressive and with frequent venous stasis dermatitis/cellulitis The pathophysiology of reflux is discussed; anti-reflux measures such as raising the head of the bed, and avoiding lying down after meals are suggested. Try to avoid ASA, NSAID's, caffeine, peppermints, alcohol and tobacco. OTC H2 blockers and/or antacids are often very helpful for PRN use.   Review of Systems   Constitutional: Positive for unexpected weight change and weight loss. Negative for chills and fever.   HENT: Negative for ear pain and postnasal drip.    Respiratory: Positive for cough, shortness of breath and wheezing. Negative for hemoptysis.    Cardiovascular: Negative for chest pain.   Gastrointestinal: Positive for abdominal distention and abdominal pain. Negative for heartburn.        Huge ventral hernia which chronic rash stable   Genitourinary: Positive for frequency.   Musculoskeletal: Positive for arthralgias and neck pain. Negative for myalgias.   Skin: Negative for rash.   Neurological: Positive for weakness. Negative for headaches.   Psychiatric/Behavioral:        Major depression with partial results with medications improved.       Patient Active Problem List   Diagnosis    Severe obesity (BMI  35.0-35.9 with comorbidity)    Diabetes mellitus with neuropathy    Long term current use of anticoagulant therapy    Major depression, recurrent, chronic    GERD (gastroesophageal reflux disease)    Tobacco dependency    Chronic atrial fibrillation    Hypertension associated with diabetes    PVD (peripheral vascular disease)    Abdominal aortic atherosclerosis    Dependency on pain medication    Iron deficiency anemia    DDD (degenerative disc disease), lumbar    Type 2 diabetes mellitus with diabetic neuropathy, without long-term current use of insulin    Hyperlipidemia associated with type 2 diabetes mellitus    NSAID long-term use    Mixed restrictive and obstructive lung disease    Hypercapnic respiratory failure, chronic    COPD (chronic obstructive pulmonary disease)    Hepatomegaly    Chronic combined systolic and diastolic CHF (congestive heart failure)    Decubitus ulcer    Type 2 diabetes mellitus, without long-term current use of insulin    Retinal detachment of left eye with multiple breaks    Chronic pain syndrome    Other nonthrombocytopenic purpura    Encephalopathy, metabolic    Chronically on benzodiazepine therapy    Chronic prescription opiate use    Flank hernia    Iron deficiency anemia due to sideropenic dysphagia       Objective:      Physical Exam  Vitals signs reviewed.   Constitutional:       Appearance: She is well-developed. She is not diaphoretic.   HENT:      Head: Normocephalic and atraumatic.      Right Ear: External ear normal.      Left Ear: External ear normal.      Nose: Nose normal.      Mouth/Throat:      Pharynx: No oropharyngeal exudate.   Eyes:      General: No scleral icterus.        Right eye: No discharge.         Left eye: No discharge.      Conjunctiva/sclera: Conjunctivae normal.      Pupils: Pupils are equal, round, and reactive to light.   Neck:      Musculoskeletal: Normal range of motion and neck supple.      Thyroid: No thyromegaly.       Vascular: No JVD.      Trachea: No tracheal deviation.   Cardiovascular:      Rate and Rhythm: Normal rate.      Heart sounds: Normal heart sounds. No murmur. No friction rub. No gallop.    Pulmonary:      Effort: Pulmonary effort is normal. No respiratory distress.      Breath sounds: No stridor. No wheezing or rales.   Chest:      Chest wall: No tenderness.   Abdominal:      General: Bowel sounds are normal. There is no distension.      Palpations: Abdomen is soft. There is no mass.      Tenderness: There is no abdominal tenderness. There is no guarding or rebound.          Comments: Left thigh he usually hernia, multiple pockets, multiple bowels are present, reducible hernia, not incarcerated.  The are none other palpable masses. Upper superficial skin with erythema no ulcers.   Musculoskeletal: Normal range of motion.   Lymphadenopathy:      Cervical: No cervical adenopathy.   Skin:     General: Skin is dry.      Coloration: Skin is not pale.      Findings: No erythema or rash.   Neurological:      Mental Status: She is alert and oriented to person, place, and time.      Cranial Nerves: No cranial nerve deficit.      Motor: No abnormal muscle tone.      Coordination: Coordination normal.      Deep Tendon Reflexes: Reflexes normal.   Psychiatric:         Mood and Affect: Mood is anxious.         Speech: Speech normal.         Behavior: Behavior normal.         Thought Content: Thought content normal.         Judgment: Judgment normal.         Lab Results   Component Value Date    WBC 10.86 09/01/2020    HGB 13.9 09/01/2020    HCT 46.1 09/01/2020     09/01/2020    CHOL 100 (L) 03/10/2020    TRIG 72 03/10/2020    HDL 48 03/10/2020    ALT 20 07/24/2020    AST 25 07/24/2020     07/24/2020    K 4.6 07/24/2020     07/24/2020    CREATININE 0.6 07/24/2020    BUN 17 07/24/2020    CO2 24 07/24/2020    TSH 0.405 05/03/2019    INR 1.0 06/27/2019    HGBA1C 5.5 03/10/2020     The ASCVD Risk score (Rell  YOLANDA Yu et al., 2013) failed to calculate for the following reasons:    The patient has a prior MI or stroke diagnosis    Assessment:       1. Type 2 diabetes mellitus with diabetic neuropathy, without long-term current use of insulin    2. NSAID long-term use    3. Chronic bronchitis, unspecified chronic bronchitis type    4. Tobacco dependency    5. Gastroesophageal reflux disease, esophagitis presence not specified    6. Lumbar degenerative disc disease    7. Spondylolisthesis of cervical region    8. Complications of gastric bypass surgery    9. Body mass index (BMI) of 30.0-30.9 in adult    10. Body mass index (bmi) 30.0-30.9, adult     11. Achlorhydria         Plan:       Type 2 diabetes mellitus with diabetic neuropathy, without long-term current use of insulin  -     Vitamin D; Future; Expected date: 09/10/2020  -     TSH; Future; Expected date: 09/10/2020  -     Magnesium; Future; Expected date: 09/10/2020  -     Hemoglobin A1C; Future; Expected date: 09/10/2020  -     MICROALBUMIN / CREATININE RATIO URINE; Future; Expected date: 09/10/2020  -     omeprazole (PRILOSEC) 20 MG capsule; Take 1 capsule (20 mg total) by mouth once daily.  Dispense: 90 capsule; Refill: 3  -     HYDROcodone-acetaminophen (NORCO)  mg per tablet; Take 1 tablet by mouth every 12 (twelve) hours as needed for Pain. Medical necessary  Dispense: 60 tablet; Refill: 0  -     Folate; Future; Expected date: 09/10/2020  -     Vitamin B12; Future; Expected date: 09/10/2020    NSAID long-term use    Chronic bronchitis, unspecified chronic bronchitis type    Tobacco dependency    Gastroesophageal reflux disease, esophagitis presence not specified  -     omeprazole (PRILOSEC) 20 MG capsule; Take 1 capsule (20 mg total) by mouth once daily.  Dispense: 90 capsule; Refill: 3    Lumbar degenerative disc disease  -     HYDROcodone-acetaminophen (NORCO)  mg per tablet; Take 1 tablet by mouth every 12 (twelve) hours as needed for Pain.  Medical necessary  Dispense: 60 tablet; Refill: 0    Spondylolisthesis of cervical region  -     HYDROcodone-acetaminophen (NORCO)  mg per tablet; Take 1 tablet by mouth every 12 (twelve) hours as needed for Pain. Medical necessary  Dispense: 60 tablet; Refill: 0    Complications of gastric bypass surgery  -     Vitamin D; Future; Expected date: 09/10/2020  -     Hemoglobin A1C; Future; Expected date: 09/10/2020  -     Folate; Future; Expected date: 09/10/2020  -     Vitamin B12; Future; Expected date: 09/10/2020    Body mass index (BMI) of 30.0-30.9 in adult    Body mass index (bmi) 30.0-30.9, adult   -     Vitamin D; Future; Expected date: 09/10/2020    Achlorhydria   -     Folate; Future; Expected date: 09/10/2020  -     Vitamin B12; Future; Expected date: 09/10/2020    Diabetes  Symptom onset has been insidious for a time period of several years .. Severity is described as mild-moderate. Course of her symptoms over time is gradual.  Improved. Lost 50 pounds, due to low starches diet.  Lipids improved, Anemia improved.  Chronic bronchitis  She also complains of increased allergy/cough symptoms. Frequency of symptoms: intermittent.    EYES: no redness, no discharge  EXTERNAL EAR CANALS: normal  RIGHT TM: normal color, normal landmarks, normal light reflex  LEFT TM: normal color, normal landmarks, normal light reflex  NOSE: clear discharge, pale boggy mucosa  MOUTH/THROAT: moist mucous membranes, no erythema, no exudate    Assessment: Seasonal allergic rhinitis/asthmaPlan: Nebulizer, and prednisone prn.    Patient readiness: acceptance and barriers:readiness and social stressors    During the course of the visit the patient was educated and counseled about the following:     Diabetes:  Discussed general issues about diabetes pathophysiology and management.  Addressed ADA diet.  Hypertension:   Screening for causes of secondary hypertension: GFR (chronic kidney disease).  Dietary sodium restriction.  Regular  aerobic exercise.  Check blood pressures 3 times weekly and record.  Obesity:   General weight loss/lifestyle modification strategies discussed (elicit support from others; identify saboteurs; non-food rewards, etc).  Informal exercise measures discussed, e.g. taking stairs instead of elevator.  Regular aerobic exercise program discussed.    Goals: Diabetes: Maintain Hemoglobin A1C below 7, Hypertension: Reduce Blood Pressure and Obesity: Reduce calorie intake and BMI    Did patient meet goals/outcomes: No    The following self management tools provided: blood pressure log  blood glucose log  excercise log    Patient Instructions (the written plan) was given to the patient/family.     Time spent with patient: 30 minutes    Barriers to medications present (no )    Adverse reactions to current medications (no)    Over the counter medications reviewed (Yes)        30-35minutes spent counseling patient on diet, exercise, and weight loss.  This has been fully explained to the patient, who indicates understanding.    Discussed health maintenance guidelines appropriate for age.

## 2020-10-03 ENCOUNTER — IMMUNIZATION (OUTPATIENT)
Dept: FAMILY MEDICINE | Facility: CLINIC | Age: 70
End: 2020-10-03
Payer: MEDICARE

## 2020-10-03 PROCEDURE — 99999 PR PBB SHADOW E&M-EST. PATIENT-LVL I: CPT | Mod: PBBFAC,HCNC,,

## 2020-10-03 PROCEDURE — 99999 PR PBB SHADOW E&M-EST. PATIENT-LVL I: ICD-10-PCS | Mod: PBBFAC,HCNC,,

## 2020-10-03 PROCEDURE — G0008 FLU VACCINE - HIGH DOSE (65+) PRESERVATIVE FREE IM: ICD-10-PCS | Mod: HCNC,S$GLB,, | Performed by: FAMILY MEDICINE

## 2020-10-03 PROCEDURE — 90662 FLU VACCINE - HIGH DOSE (65+) PRESERVATIVE FREE IM: ICD-10-PCS | Mod: HCNC,S$GLB,, | Performed by: FAMILY MEDICINE

## 2020-10-03 PROCEDURE — G0008 ADMIN INFLUENZA VIRUS VAC: HCPCS | Mod: HCNC,S$GLB,, | Performed by: FAMILY MEDICINE

## 2020-10-03 PROCEDURE — 90662 IIV NO PRSV INCREASED AG IM: CPT | Mod: HCNC,S$GLB,, | Performed by: FAMILY MEDICINE

## 2020-10-05 ENCOUNTER — PATIENT MESSAGE (OUTPATIENT)
Dept: ADMINISTRATIVE | Facility: HOSPITAL | Age: 70
End: 2020-10-05

## 2020-10-09 ENCOUNTER — PATIENT MESSAGE (OUTPATIENT)
Dept: FAMILY MEDICINE | Facility: CLINIC | Age: 70
End: 2020-10-09

## 2020-10-09 DIAGNOSIS — J41.8 MIXED SIMPLE AND MUCOPURULENT CHRONIC BRONCHITIS: ICD-10-CM

## 2020-10-09 RX ORDER — FLUTICASONE FUROATE AND VILANTEROL TRIFENATATE 100; 25 UG/1; UG/1
1 POWDER RESPIRATORY (INHALATION) DAILY
Qty: 60 EACH | Refills: 4 | Status: SHIPPED | OUTPATIENT
Start: 2020-10-09 | End: 2020-12-20 | Stop reason: SDUPTHER

## 2020-10-09 NOTE — TELEPHONE ENCOUNTER
Prescription refill request.   LOV:09/10/2020  NOV:01/13/2021  LDR: 08/05/2020  Last Labs: 09/01/2020

## 2020-10-14 DIAGNOSIS — E11.610 DIABETIC NEUROGENIC ARTHROPATHY: ICD-10-CM

## 2020-10-14 RX ORDER — GABAPENTIN 800 MG/1
800 TABLET ORAL 3 TIMES DAILY
Qty: 270 TABLET | Refills: 3 | Status: SHIPPED | OUTPATIENT
Start: 2020-10-14 | End: 2020-10-28 | Stop reason: SDUPTHER

## 2020-11-02 DIAGNOSIS — M51.36 LUMBAR DEGENERATIVE DISC DISEASE: ICD-10-CM

## 2020-11-02 RX ORDER — CLONAZEPAM 0.5 MG/1
0.5 TABLET ORAL 2 TIMES DAILY PRN
Qty: 60 TABLET | Refills: 0 | OUTPATIENT
Start: 2020-11-02

## 2020-11-04 ENCOUNTER — PATIENT MESSAGE (OUTPATIENT)
Dept: FAMILY MEDICINE | Facility: CLINIC | Age: 70
End: 2020-11-04

## 2020-11-11 ENCOUNTER — PES CALL (OUTPATIENT)
Dept: ADMINISTRATIVE | Facility: CLINIC | Age: 70
End: 2020-11-11

## 2020-11-25 ENCOUNTER — TELEPHONE (OUTPATIENT)
Dept: FAMILY MEDICINE | Facility: CLINIC | Age: 70
End: 2020-11-25

## 2020-11-25 DIAGNOSIS — J43.9 PULMONARY EMPHYSEMA, UNSPECIFIED EMPHYSEMA TYPE: ICD-10-CM

## 2020-11-25 DIAGNOSIS — M51.36 LUMBAR DEGENERATIVE DISC DISEASE: ICD-10-CM

## 2020-11-25 DIAGNOSIS — M43.12 SPONDYLOLISTHESIS OF CERVICAL REGION: ICD-10-CM

## 2020-11-25 DIAGNOSIS — E11.40 TYPE 2 DIABETES MELLITUS WITH DIABETIC NEUROPATHY, WITHOUT LONG-TERM CURRENT USE OF INSULIN: ICD-10-CM

## 2020-11-25 RX ORDER — HYDROCODONE BITARTRATE AND ACETAMINOPHEN 10; 325 MG/1; MG/1
1 TABLET ORAL EVERY 12 HOURS PRN
Qty: 60 TABLET | Refills: 0 | Status: SHIPPED | OUTPATIENT
Start: 2020-11-25 | End: 2020-11-30 | Stop reason: SDUPTHER

## 2020-11-25 RX ORDER — LEVALBUTEROL INHALATION SOLUTION 0.63 MG/3ML
SOLUTION RESPIRATORY (INHALATION)
Qty: 792 ML | Refills: 0 | Status: SHIPPED | OUTPATIENT
Start: 2020-11-25 | End: 2021-02-26 | Stop reason: SDUPTHER

## 2020-11-25 NOTE — TELEPHONE ENCOUNTER
Prescription refill request.   LOV: 09/10/2020  NOV: 01/13/2021  LDR: 10/28/2020  Last Labs: 09/01/2020     Instructions (Optional): Patient to schedule removal at his earliest convenience Detail Level: Detailed Introduction Text (Please End With A Colon): Discussed potential dx and options. 150

## 2020-11-27 ENCOUNTER — PATIENT MESSAGE (OUTPATIENT)
Dept: FAMILY MEDICINE | Facility: CLINIC | Age: 70
End: 2020-11-27

## 2020-11-28 ENCOUNTER — PATIENT MESSAGE (OUTPATIENT)
Dept: FAMILY MEDICINE | Facility: CLINIC | Age: 70
End: 2020-11-28

## 2020-11-30 ENCOUNTER — TELEPHONE (OUTPATIENT)
Dept: FAMILY MEDICINE | Facility: CLINIC | Age: 70
End: 2020-11-30

## 2020-11-30 DIAGNOSIS — E11.40 TYPE 2 DIABETES MELLITUS WITH DIABETIC NEUROPATHY, WITHOUT LONG-TERM CURRENT USE OF INSULIN: ICD-10-CM

## 2020-11-30 DIAGNOSIS — M43.12 SPONDYLOLISTHESIS OF CERVICAL REGION: ICD-10-CM

## 2020-11-30 DIAGNOSIS — M51.36 LUMBAR DEGENERATIVE DISC DISEASE: ICD-10-CM

## 2020-11-30 NOTE — TELEPHONE ENCOUNTER
----- Message from Sophia Sky sent at 11/30/2020 10:04 AM CST -----  Contact: pt  Type: Needs Medical Advice    Who Called: pt  Best Call Back Number: [phone]  Additional Info-Pt is inquiring about her NORCO prescription.  Pharmacy is stating that they do not have a request to refill.  Please Advise-Thank you~      [id number] - [place] - [address] [facility] [address]  [address] [address]  Phone: [phone] Fax: [phone]

## 2020-11-30 NOTE — TELEPHONE ENCOUNTER
----- Message from Shira Aguirre sent at 11/30/2020  9:52 AM CST -----  Contact: self    Type: Needs Medical Advice  Who Called: patient     Pharmacy name and phone #:    [id number] - [place] - [address] AT [facility]  [address] [address]  Phone: [phone] Fax: [phone]      Best Call Back Number: [phone] (home) [phone] (work)    Additional Information:patient states pharamcy did not received her prescription for    HYDROcodone-acetaminophen (NORCO)  mg per tablet, sent on 11/25/2020

## 2020-12-01 ENCOUNTER — LAB VISIT (OUTPATIENT)
Dept: LAB | Facility: HOSPITAL | Age: 70
End: 2020-12-01
Attending: INTERNAL MEDICINE
Payer: MEDICARE

## 2020-12-01 DIAGNOSIS — D50.1 IRON DEFICIENCY ANEMIA DUE TO SIDEROPENIC DYSPHAGIA: ICD-10-CM

## 2020-12-01 LAB
ALBUMIN SERPL BCP-MCNC: 3.9 G/DL (ref 3.5–5.2)
ALP SERPL-CCNC: 102 U/L (ref 55–135)
ALT SERPL W/O P-5'-P-CCNC: 28 U/L (ref 10–44)
ANION GAP SERPL CALC-SCNC: 11 MMOL/L (ref 8–16)
AST SERPL-CCNC: 28 U/L (ref 10–40)
BASOPHILS # BLD AUTO: 0.05 K/UL (ref 0–0.2)
BASOPHILS NFR BLD: 0.8 % (ref 0–1.9)
BILIRUB SERPL-MCNC: 0.6 MG/DL (ref 0.1–1)
BUN SERPL-MCNC: 15 MG/DL (ref 8–23)
CALCIUM SERPL-MCNC: 9.4 MG/DL (ref 8.7–10.5)
CHLORIDE SERPL-SCNC: 100 MMOL/L (ref 95–110)
CO2 SERPL-SCNC: 29 MMOL/L (ref 23–29)
CREAT SERPL-MCNC: 0.6 MG/DL (ref 0.5–1.4)
DIFFERENTIAL METHOD: ABNORMAL
EOSINOPHIL # BLD AUTO: 0.2 K/UL (ref 0–0.5)
EOSINOPHIL NFR BLD: 2.7 % (ref 0–8)
ERYTHROCYTE [DISTWIDTH] IN BLOOD BY AUTOMATED COUNT: 13 % (ref 11.5–14.5)
EST. GFR  (AFRICAN AMERICAN): >60 ML/MIN/1.73 M^2
EST. GFR  (NON AFRICAN AMERICAN): >60 ML/MIN/1.73 M^2
GLUCOSE SERPL-MCNC: 82 MG/DL (ref 70–110)
HCT VFR BLD AUTO: 47.1 % (ref 37–48.5)
HGB BLD-MCNC: 14.5 G/DL (ref 12–16)
IMM GRANULOCYTES # BLD AUTO: 0.01 K/UL (ref 0–0.04)
IMM GRANULOCYTES NFR BLD AUTO: 0.2 % (ref 0–0.5)
LYMPHOCYTES # BLD AUTO: 1.4 K/UL (ref 1–4.8)
LYMPHOCYTES NFR BLD: 22.9 % (ref 18–48)
MCH RBC QN AUTO: 30.4 PG (ref 27–31)
MCHC RBC AUTO-ENTMCNC: 30.8 G/DL (ref 32–36)
MCV RBC AUTO: 99 FL (ref 82–98)
MONOCYTES # BLD AUTO: 0.8 K/UL (ref 0.3–1)
MONOCYTES NFR BLD: 13 % (ref 4–15)
NEUTROPHILS # BLD AUTO: 3.6 K/UL (ref 1.8–7.7)
NEUTROPHILS NFR BLD: 60.4 % (ref 38–73)
NRBC BLD-RTO: 0 /100 WBC
PLATELET # BLD AUTO: 153 K/UL (ref 150–350)
PMV BLD AUTO: 10.4 FL (ref 9.2–12.9)
POTASSIUM SERPL-SCNC: 4.5 MMOL/L (ref 3.5–5.1)
PROT SERPL-MCNC: 7.2 G/DL (ref 6–8.4)
RBC # BLD AUTO: 4.77 M/UL (ref 4–5.4)
SODIUM SERPL-SCNC: 140 MMOL/L (ref 136–145)
WBC # BLD AUTO: 5.94 K/UL (ref 3.9–12.7)

## 2020-12-01 PROCEDURE — 80053 COMPREHEN METABOLIC PANEL: CPT | Mod: HCNC

## 2020-12-01 PROCEDURE — 36415 COLL VENOUS BLD VENIPUNCTURE: CPT | Mod: HCNC

## 2020-12-01 PROCEDURE — 85025 COMPLETE CBC W/AUTO DIFF WBC: CPT | Mod: HCNC

## 2020-12-01 RX ORDER — HYDROCODONE BITARTRATE AND ACETAMINOPHEN 10; 325 MG/1; MG/1
1 TABLET ORAL EVERY 12 HOURS PRN
Qty: 60 TABLET | Refills: 0 | Status: SHIPPED | OUTPATIENT
Start: 2020-12-01 | End: 2020-12-29 | Stop reason: SDUPTHER

## 2020-12-04 ENCOUNTER — OFFICE VISIT (OUTPATIENT)
Dept: HEMATOLOGY/ONCOLOGY | Facility: CLINIC | Age: 70
End: 2020-12-04
Payer: MEDICARE

## 2020-12-04 VITALS
OXYGEN SATURATION: 98 % | BODY MASS INDEX: 34.35 KG/M2 | TEMPERATURE: 99 F | HEIGHT: 68 IN | RESPIRATION RATE: 18 BRPM | WEIGHT: 226.63 LBS | HEART RATE: 72 BPM | DIASTOLIC BLOOD PRESSURE: 63 MMHG | SYSTOLIC BLOOD PRESSURE: 137 MMHG

## 2020-12-04 DIAGNOSIS — E11.40 TYPE 2 DIABETES MELLITUS WITH DIABETIC NEUROPATHY, WITHOUT LONG-TERM CURRENT USE OF INSULIN: ICD-10-CM

## 2020-12-04 DIAGNOSIS — J40 BRONCHITIS: Primary | ICD-10-CM

## 2020-12-04 DIAGNOSIS — Z86.2 HISTORY OF IRON DEFICIENCY ANEMIA: ICD-10-CM

## 2020-12-04 DIAGNOSIS — Z79.899 ON STATIN THERAPY: ICD-10-CM

## 2020-12-04 DIAGNOSIS — R16.0 HEPATOMEGALY: ICD-10-CM

## 2020-12-04 PROCEDURE — 99999 PR PBB SHADOW E&M-EST. PATIENT-LVL III: CPT | Mod: PBBFAC,HCNC,, | Performed by: INTERNAL MEDICINE

## 2020-12-04 PROCEDURE — 2024F PR 7 FIELD PHOTOS WITH INTERP/ REVIEW: ICD-10-PCS | Mod: HCNC,S$GLB,, | Performed by: INTERNAL MEDICINE

## 2020-12-04 PROCEDURE — 3078F DIAST BP <80 MM HG: CPT | Mod: HCNC,CPTII,S$GLB, | Performed by: INTERNAL MEDICINE

## 2020-12-04 PROCEDURE — 99214 PR OFFICE/OUTPT VISIT, EST, LEVL IV, 30-39 MIN: ICD-10-PCS | Mod: HCNC,S$GLB,, | Performed by: INTERNAL MEDICINE

## 2020-12-04 PROCEDURE — 3075F SYST BP GE 130 - 139MM HG: CPT | Mod: HCNC,CPTII,S$GLB, | Performed by: INTERNAL MEDICINE

## 2020-12-04 PROCEDURE — 1101F PT FALLS ASSESS-DOCD LE1/YR: CPT | Mod: HCNC,CPTII,S$GLB, | Performed by: INTERNAL MEDICINE

## 2020-12-04 PROCEDURE — 3288F FALL RISK ASSESSMENT DOCD: CPT | Mod: HCNC,CPTII,S$GLB, | Performed by: INTERNAL MEDICINE

## 2020-12-04 PROCEDURE — 3044F PR MOST RECENT HEMOGLOBIN A1C LEVEL <7.0%: ICD-10-PCS | Mod: HCNC,CPTII,S$GLB, | Performed by: INTERNAL MEDICINE

## 2020-12-04 PROCEDURE — 3008F BODY MASS INDEX DOCD: CPT | Mod: HCNC,CPTII,S$GLB, | Performed by: INTERNAL MEDICINE

## 2020-12-04 PROCEDURE — 1125F AMNT PAIN NOTED PAIN PRSNT: CPT | Mod: HCNC,S$GLB,, | Performed by: INTERNAL MEDICINE

## 2020-12-04 PROCEDURE — 99499 UNLISTED E&M SERVICE: CPT | Mod: S$GLB,,, | Performed by: INTERNAL MEDICINE

## 2020-12-04 PROCEDURE — 1125F PR PAIN SEVERITY QUANTIFIED, PAIN PRESENT: ICD-10-PCS | Mod: HCNC,S$GLB,, | Performed by: INTERNAL MEDICINE

## 2020-12-04 PROCEDURE — 1157F PR ADVANCE CARE PLAN OR EQUIV PRESENT IN MEDICAL RECORD: ICD-10-PCS | Mod: HCNC,S$GLB,, | Performed by: INTERNAL MEDICINE

## 2020-12-04 PROCEDURE — 1101F PR PT FALLS ASSESS DOC 0-1 FALLS W/OUT INJ PAST YR: ICD-10-PCS | Mod: HCNC,CPTII,S$GLB, | Performed by: INTERNAL MEDICINE

## 2020-12-04 PROCEDURE — 3078F PR MOST RECENT DIASTOLIC BLOOD PRESSURE < 80 MM HG: ICD-10-PCS | Mod: HCNC,CPTII,S$GLB, | Performed by: INTERNAL MEDICINE

## 2020-12-04 PROCEDURE — 3288F PR FALLS RISK ASSESSMENT DOCUMENTED: ICD-10-PCS | Mod: HCNC,CPTII,S$GLB, | Performed by: INTERNAL MEDICINE

## 2020-12-04 PROCEDURE — 2024F 7 FLD RTA PHOTO EVC RTNOPTHY: CPT | Mod: HCNC,S$GLB,, | Performed by: INTERNAL MEDICINE

## 2020-12-04 PROCEDURE — 3044F HG A1C LEVEL LT 7.0%: CPT | Mod: HCNC,CPTII,S$GLB, | Performed by: INTERNAL MEDICINE

## 2020-12-04 PROCEDURE — 1159F PR MEDICATION LIST DOCUMENTED IN MEDICAL RECORD: ICD-10-PCS | Mod: HCNC,S$GLB,, | Performed by: INTERNAL MEDICINE

## 2020-12-04 PROCEDURE — 3008F PR BODY MASS INDEX (BMI) DOCUMENTED: ICD-10-PCS | Mod: HCNC,CPTII,S$GLB, | Performed by: INTERNAL MEDICINE

## 2020-12-04 PROCEDURE — 1159F MED LIST DOCD IN RCRD: CPT | Mod: HCNC,S$GLB,, | Performed by: INTERNAL MEDICINE

## 2020-12-04 PROCEDURE — 99214 OFFICE O/P EST MOD 30 MIN: CPT | Mod: HCNC,S$GLB,, | Performed by: INTERNAL MEDICINE

## 2020-12-04 PROCEDURE — 99999 PR PBB SHADOW E&M-EST. PATIENT-LVL III: ICD-10-PCS | Mod: PBBFAC,HCNC,, | Performed by: INTERNAL MEDICINE

## 2020-12-04 PROCEDURE — 3075F PR MOST RECENT SYSTOLIC BLOOD PRESS GE 130-139MM HG: ICD-10-PCS | Mod: HCNC,CPTII,S$GLB, | Performed by: INTERNAL MEDICINE

## 2020-12-04 PROCEDURE — 1157F ADVNC CARE PLAN IN RCRD: CPT | Mod: HCNC,S$GLB,, | Performed by: INTERNAL MEDICINE

## 2020-12-04 PROCEDURE — 99499 RISK ADDL DX/OHS AUDIT: ICD-10-PCS | Mod: S$GLB,,, | Performed by: INTERNAL MEDICINE

## 2020-12-04 RX ORDER — LEVOFLOXACIN 500 MG/1
500 TABLET, FILM COATED ORAL DAILY
Qty: 10 TABLET | Refills: 0 | Status: SHIPPED | OUTPATIENT
Start: 2020-12-04 | End: 2020-12-14

## 2020-12-04 NOTE — PROGRESS NOTES
CHIEF COMPLAINT:   I am coughing up green phlegm     Ms. Tian is a 70 -year-old female who has a history of progressive anemia after  IV iron.    Here today for routine labs for anemia. Post IV iron in the past  . New cough with green sputum and wheezes   She is tolerating Glucophage for diabetes as well as Lexapro for depression and Zestril  for hypertension  Trazodone not helping her sleep  Tolerating eliquis for a fib   She is scheduled to have gastric bypass after Covid      Pt asking for a refill on an antibiotic given to her by Dr Jacobsen that patient has just been refilling for months : pt states she has been taking them for a year     Past Medical History:   Diagnosis Date    *Atrial flutter     Angina pectoris 9/18/2017    Anxiety     Arthritis     Asthma     Atrial fibrillation     Back pain     Cataract     OD    CHF (congestive heart failure)     COPD (chronic obstructive pulmonary disease)     Depression     Diabetes mellitus     Emphysema of lung     Heart failure     Hepatomegaly 2/3/2016    Hernia     History of MI (myocardial infarction) 1/19/2016    Hypercapnic respiratory failure, chronic 11/16/2016    Hyperlipidemia     Hypertension     Iron deficiency anemia 2/3/2016    Myocardial infarction     Obesity     Peripheral vascular disease     Pneumonia     Polyneuropathy     Retinal detachment     OS    Septic shock 4/23/2017    Skin ulcer 3/18/2017    Tobacco dependence     Type II 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, not stated as uncontrolled(250.60)    tolerating lipitor for dysipidemia and neurontin for paresthesias      Current Outpatient Medications:     albuterol (ACCUNEB) 0.63 mg/3 mL Nebu, Take 3 mLs (0.63 mg total) by nebulization every 6 (six) hours as needed. Rescue, Disp: 75 mL, Rfl: 11    albuterol (PROVENTIL/VENTOLIN HFA) 90 mcg/actuation inhaler, Inhale 2 puffs into the lungs every 6 (six) hours as needed for  Wheezing. Rescue, Disp: 54 g, Rfl: 3    apixaban (ELIQUIS) 5 mg Tab, Take 1 tablet (5 mg total) by mouth 2 (two) times daily., Disp: 180 tablet, Rfl: 3    ascorbic acid, vitamin C, (VITAMIN C) 500 MG tablet, Take 2 tablets (1,000 mg total) by mouth every evening., Disp: , Rfl:     atorvastatin (LIPITOR) 20 MG tablet, Take 1 tablet (20 mg total) by mouth once daily., Disp: 90 tablet, Rfl: 3    blood-glucose meter (TRUE METRIX AIR GLUCOSE METER) kit, AC meals. Insulin dependent.Dispence strips and lancets 3 months., Disp: 1 each, Rfl: 0    budesonide (PULMICORT) 0.5 mg/2 mL nebulizer solution, INHALE THE CONTENTS OF 1 VIAL VIA NEBULIZER EVERY DAY, Disp: 180 mL, Rfl: 0    clonazePAM (KLONOPIN) 0.5 MG tablet, Take 1 tablet (0.5 mg total) by mouth 2 (two) times daily as needed., Disp: 60 tablet, Rfl: 2    ferrous sulfate (FEOSOL) 325 mg (65 mg iron) Tab tablet, Take 1 tablet (325 mg total) by mouth once daily., Disp: 60 tablet, Rfl: 3    fluticasone furoate-vilanteroL (BREO ELLIPTA) 100-25 mcg/dose diskus inhaler, Inhale 1 puff into the lungs once daily. Controller, Disp: 60 each, Rfl: 4    fluticasone propionate (FLONASE) 50 mcg/actuation nasal spray, 1 spray (50 mcg total) by Each Nostril route once daily., Disp: 1 g, Rfl: 3    furosemide (LASIX) 40 MG tablet, Take 1 tablet (40 mg total) by mouth once daily., Disp: 90 tablet, Rfl: 3    gabapentin (NEURONTIN) 800 MG tablet, Take 1 tablet (800 mg total) by mouth 3 (three) times daily., Disp: 270 tablet, Rfl: 3    HYDROcodone-acetaminophen (NORCO)  mg per tablet, Take 1 tablet by mouth every 12 (twelve) hours as needed for Pain. Medical necessary, Disp: 60 tablet, Rfl: 0    lancets Misc, To check BG 3 times daily, to use with insurance preferred meter., Disp: 300 each, Rfl: 11    levalbuterol (XOPENEX) 0.63 mg/3 mL nebulizer solution, INHALE THE CONTENTS OF 1 VIAL BY NEBULIZATION 4 times  DAILY.    DX: J43.9, Disp: 792 mL, Rfl: 0    lisinopriL  (PRINIVIL,ZESTRIL) 20 MG tablet, Take 1 tablet (20 mg total) by mouth 2 (two) times daily., Disp: 90 tablet, Rfl: 11    metFORMIN (GLUCOPHAGE) 500 MG tablet, Take 1 tablet (500 mg total) by mouth daily with breakfast., Disp: 90 tablet, Rfl: 3    multivitamin (THERAGRAN) tablet, Take 1 tablet by mouth once daily., Disp: , Rfl:     nystatin (NYSTOP) powder, Apply topically 2 (two) times daily., Disp: 120 g, Rfl: 11    nystatin-triamcinolone (MYCOLOG II) cream, Apply topically 3 (three) times daily. to affected area, Disp: 30 g, Rfl: 1    omega-3 acid ethyl esters (LOVAZA) 1 gram capsule, Take 1 g by mouth 2 (two) times daily. , Disp: , Rfl:     omeprazole (PRILOSEC) 20 MG capsule, Take 1 capsule (20 mg total) by mouth once daily., Disp: 90 capsule, Rfl: 3    ondansetron (ZOFRAN-ODT) 8 MG TbDL, DISSOLVE 1 TABLET ON THE TONGUE EVERY 8 HOURS (Patient taking differently: Take 8 mg by mouth as needed. ), Disp: 270 tablet, Rfl: 3    polyethylene glycol (GLYCOLAX) 17 gram/dose powder, Take 17 g by mouth once daily. , Disp: , Rfl:     potassium chloride SA (K-DUR,KLOR-CON) 20 MEQ tablet, Take 1 tablet (20 mEq total) by mouth once daily., Disp: 30 tablet, Rfl: 11    senna-docusate 8.6-50 mg (PERICOLACE) 8.6-50 mg per tablet, Take 1 tablet by mouth 2 (two) times daily as needed for Constipation., Disp: , Rfl:     TRUE METRIX GLUCOSE TEST STRIP Strp, TEST BLOOD SUGAR THREE TIMES A DAY , Disp: 300 strip, Rfl: 3    loratadine (CLARITIN) 10 mg tablet, Take 1 tablet (10 mg total) by mouth once daily., Disp: , Rfl: 0  No current facility-administered medications for this visit.     Facility-Administered Medications Ordered in Other Visits:     sodium hyaluronate (orthovisc) Syrg 15 mg, 15 mg, , , Mehran Carrasco MD, 15 mg at 02/07/20 1000    sodium hyaluronate (orthovisc) Syrg 15 mg, 15 mg, , , Mehran Carrasco MD, 15 mg at 02/07/20 1000    Tolerating zofran for nausea and glucophage for DM     CONSTITUTIONAL: No  "fevers, chills, night sweats, wt. loss,  + weight gain and appetite changes  SKIN: no rashes or itching  ENT: No headaches, head trauma, vision changes, or eye pain  LYMPH NODES: None noticed   Endocrine no excessive thirst   CV: No chest pain, palpitations.   RESP: No dyspnea on exertion, cough, wheezing, or hemoptysis  GI: No nausea, emesis, diarrhea, constipation, melena, hematochezia, pain.   : No dysuria, hematuria, urgency, or frequency   HEME ++  easy bruising,no  bleeding problems  PSYCHIATRIC: No depression, anxiety, psychosis, hallucinations.  NEURO: No seizures, memory loss, dizziness   + headaches   MSK: No arthralgias or joint swelling        PHYSICAL EXAMINATION:    /63 (BP Location: Right arm, Patient Position: Sitting, BP Method: Large (Automatic))   Pulse 72   Temp 98.7 °F (37.1 °C) (Temporal)   Resp 18   Ht 5' 8" (1.727 m)   Wt 102.8 kg (226 lb 10.1 oz)   LMP  (LMP Unknown)   SpO2 98%   BMI 34.46 kg/m²     GENERAL:  She is obese.  pale and exhausted   PSYCH:  Flat  affect.  No anxiety or depression.  HEENT:  Normocephalic.  Lids intact, conjunctivae pale     OP clear,  No palatal pallor.  No palpable thyromegaly   NECK:  Supple.  Trachea midline.  No palpable abnormalities.  CHEST: + wheezes and coarse BS left  CV S1S2 with RRR no loud S1 or S2  No PMI   ABDOMEN:  NT, ND.  Normal bowel sounds.  No palpable HSM or mass.  EXTREMITIES:  No limited ROM  No edema   NEUROLOGICAL:  Patient is ambulating well with walker     SKIN:  Warm, dry.  Ecchymosis evident.  No tenting, petechiae  Areas of hypopigmentation         Lab Results   Component Value Date    WBC 5.94 12/01/2020    HGB 14.5 12/01/2020    HCT 47.1 12/01/2020    MCV 99 (H) 12/01/2020     12/01/2020                  Ferritin 20.0 - 300.0 ng/mL 18Low   20  20  29  54  16Low   351       CMP  Sodium   Date Value Ref Range Status   12/01/2020 140 136 - 145 mmol/L Final     Potassium   Date Value Ref Range Status   12/01/2020 " 4.5 3.5 - 5.1 mmol/L Final     Chloride   Date Value Ref Range Status   12/01/2020 100 95 - 110 mmol/L Final     CO2   Date Value Ref Range Status   12/01/2020 29 23 - 29 mmol/L Final     Glucose   Date Value Ref Range Status   12/01/2020 82 70 - 110 mg/dL Final     BUN   Date Value Ref Range Status   12/01/2020 15 8 - 23 mg/dL Final     Creatinine   Date Value Ref Range Status   12/01/2020 0.6 0.5 - 1.4 mg/dL Final     Calcium   Date Value Ref Range Status   12/01/2020 9.4 8.7 - 10.5 mg/dL Final     Total Protein   Date Value Ref Range Status   12/01/2020 7.2 6.0 - 8.4 g/dL Final     Albumin   Date Value Ref Range Status   12/01/2020 3.9 3.5 - 5.2 g/dL Final     Total Bilirubin   Date Value Ref Range Status   12/01/2020 0.6 0.1 - 1.0 mg/dL Final     Comment:     For infants and newborns, interpretation of results should be based  on gestational age, weight and in agreement with clinical  observations.  Premature Infant recommended reference ranges:  Up to 24 hours.............<8.0 mg/dL  Up to 48 hours............<12.0 mg/dL  3-5 days..................<15.0 mg/dL  6-29 days.................<15.0 mg/dL       Alkaline Phosphatase   Date Value Ref Range Status   12/01/2020 102 55 - 135 U/L Final     AST   Date Value Ref Range Status   12/01/2020 28 10 - 40 U/L Final     ALT   Date Value Ref Range Status   12/01/2020 28 10 - 44 U/L Final     Anion Gap   Date Value Ref Range Status   12/01/2020 11 8 - 16 mmol/L Final     eGFR if    Date Value Ref Range Status   12/01/2020 >60 >60 mL/min/1.73 m^2 Final     eGFR if non    Date Value Ref Range Status   12/01/2020 >60 >60 mL/min/1.73 m^2 Final     Comment:     Calculation used to obtain the estimated glomerular filtration  rate (eGFR) is the CKD-EPI equation.          Problem List Items Addressed This Visit        Endocrine    Type 2 diabetes mellitus with diabetic neuropathy, without long-term current use of insulin       GI    Hepatomegaly       Other Visit Diagnoses     Bronchitis    -  Primary    Relevant Medications    levoFLOXacin (LEVAQUIN) 500 MG tablet    History of iron deficiency anemia        Relevant Orders    CBC Auto Differential    Iron and TIBC    On statin therapy                 levaquin for ten days   See DR Bird  See DR hernandez for F/U as well   paroxysmal A fib : she is stable with eliquis to prevent thrombotic event   Nice response to Iv iron   SHe does have low IGG2 hence explained if she ever develops an infection that does not respond to the antibiotics she may need IVIG   Cont lipitor to prevent plaques monitored by pcp  Cont lasix to help with edema for BP monitored by pcp  Discussed diet and weight control   She is to continue eliquis for chronic atrial fibrillation as well as her diabetic medication to help control hyperglycemia due to diabetes  RTC  3 months with labs to monitor for anemia while on anticoagulation   Watch for bleeding       Advance Care Planning     Living Will  During this visit, I engaged the patient  in the advance care planning process.  The patient and I reviewed the role for advance directives and their purpose in directing future healthcare if the patient's unable to speak for him/herself.  At this point in time, the patient does have full decision-making capacity.  We discussed different extreme health states that she could experience, and reviewed what kind of medical care she would want in those situations.  The patient communicated that if she were comatose and had little chance of a meaningful recovery, she would not want machines/life-sustaining treatments used. I spent a total of  30  minutes engaging the patient in this advance care planning discussion.

## 2020-12-10 ENCOUNTER — PES CALL (OUTPATIENT)
Dept: ADMINISTRATIVE | Facility: CLINIC | Age: 70
End: 2020-12-10

## 2020-12-11 ENCOUNTER — PATIENT MESSAGE (OUTPATIENT)
Dept: OTHER | Facility: OTHER | Age: 70
End: 2020-12-11

## 2020-12-18 ENCOUNTER — TELEPHONE (OUTPATIENT)
Dept: HEMATOLOGY/ONCOLOGY | Facility: CLINIC | Age: 70
End: 2020-12-18

## 2020-12-18 NOTE — TELEPHONE ENCOUNTER
----- Message from Kalani Branch RN sent at 12/7/2020  9:01 AM CST -----  Regarding: ty    ----- Message -----  From: Laura Ramos MD  Sent: 12/4/2020  11:27 AM CST  To: Kalani Branch RN    RTc 3 months with labs

## 2021-01-04 ENCOUNTER — PATIENT MESSAGE (OUTPATIENT)
Dept: ADMINISTRATIVE | Facility: HOSPITAL | Age: 71
End: 2021-01-04

## 2021-01-11 ENCOUNTER — LAB VISIT (OUTPATIENT)
Dept: LAB | Facility: HOSPITAL | Age: 71
End: 2021-01-11
Attending: FAMILY MEDICINE
Payer: MEDICARE
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
## 2021-01-11 DIAGNOSIS — K91.89 COMPLICATIONS OF GASTRIC BYPASS SURGERY: ICD-10-CM

## 2021-01-11 DIAGNOSIS — K31.83 ACHLORHYDRIA: ICD-10-CM

## 2021-01-11 DIAGNOSIS — E11.40 TYPE 2 DIABETES MELLITUS WITH DIABETIC NEUROPATHY, WITHOUT LONG-TERM CURRENT USE OF INSULIN: ICD-10-CM

## 2021-01-11 LAB
25(OH)D3+25(OH)D2 SERPL-MCNC: 41 NG/ML (ref 30–96)
ESTIMATED AVG GLUCOSE: 97 MG/DL (ref 68–131)
FOLATE SERPL-MCNC: 17.3 NG/ML (ref 4–24)
HBA1C MFR BLD HPLC: 5 % (ref 4–5.6)
MAGNESIUM SERPL-MCNC: 2 MG/DL (ref 1.6–2.6)
TSH SERPL DL<=0.005 MIU/L-ACNC: 1.31 UIU/ML (ref 0.4–4)
VIT B12 SERPL-MCNC: 524 PG/ML (ref 210–950)

## 2021-01-11 PROCEDURE — 36415 COLL VENOUS BLD VENIPUNCTURE: CPT | Mod: PO

## 2021-01-11 PROCEDURE — 82746 ASSAY OF FOLIC ACID SERUM: CPT

## 2021-01-11 PROCEDURE — 83735 ASSAY OF MAGNESIUM: CPT

## 2021-01-11 PROCEDURE — 83036 HEMOGLOBIN GLYCOSYLATED A1C: CPT

## 2021-01-11 PROCEDURE — 84443 ASSAY THYROID STIM HORMONE: CPT

## 2021-01-11 PROCEDURE — 82607 VITAMIN B-12: CPT

## 2021-01-11 PROCEDURE — 82306 VITAMIN D 25 HYDROXY: CPT

## 2021-01-13 ENCOUNTER — TELEPHONE (OUTPATIENT)
Dept: FAMILY MEDICINE | Facility: CLINIC | Age: 71
End: 2021-01-13

## 2021-01-15 ENCOUNTER — OFFICE VISIT (OUTPATIENT)
Dept: FAMILY MEDICINE | Facility: CLINIC | Age: 71
End: 2021-01-15
Payer: MEDICARE

## 2021-01-15 ENCOUNTER — HOSPITAL ENCOUNTER (EMERGENCY)
Facility: HOSPITAL | Age: 71
Discharge: HOME | End: 2021-01-15
Attending: EMERGENCY MEDICINE
Payer: MEDICARE

## 2021-01-15 VITALS
HEART RATE: 79 BPM | OXYGEN SATURATION: 95 % | DIASTOLIC BLOOD PRESSURE: 74 MMHG | BODY MASS INDEX: 35.25 KG/M2 | HEIGHT: 68 IN | TEMPERATURE: 98 F | RESPIRATION RATE: 17 BRPM | SYSTOLIC BLOOD PRESSURE: 132 MMHG | WEIGHT: 232.56 LBS

## 2021-01-15 VITALS
HEIGHT: 68 IN | OXYGEN SATURATION: 96 % | HEART RATE: 68 BPM | TEMPERATURE: 98 F | BODY MASS INDEX: 34.86 KG/M2 | WEIGHT: 230 LBS | SYSTOLIC BLOOD PRESSURE: 112 MMHG | DIASTOLIC BLOOD PRESSURE: 49 MMHG | RESPIRATION RATE: 22 BRPM

## 2021-01-15 DIAGNOSIS — Z12.31 ENCOUNTER FOR SCREENING MAMMOGRAM FOR MALIGNANT NEOPLASM OF BREAST: ICD-10-CM

## 2021-01-15 DIAGNOSIS — M51.36 LUMBAR DEGENERATIVE DISC DISEASE: ICD-10-CM

## 2021-01-15 DIAGNOSIS — E11.40 TYPE 2 DIABETES MELLITUS WITH DIABETIC NEUROPATHY, WITHOUT LONG-TERM CURRENT USE OF INSULIN: ICD-10-CM

## 2021-01-15 DIAGNOSIS — E66.01 SEVERE OBESITY (BMI 35.0-35.9 WITH COMORBIDITY): ICD-10-CM

## 2021-01-15 DIAGNOSIS — M43.12 SPONDYLOLISTHESIS OF CERVICAL REGION: ICD-10-CM

## 2021-01-15 DIAGNOSIS — E11.51 TYPE 2 DIABETES MELLITUS WITH DIABETIC PERIPHERAL ANGIOPATHY WITHOUT GANGRENE, WITHOUT LONG-TERM CURRENT USE OF INSULIN: ICD-10-CM

## 2021-01-15 DIAGNOSIS — L89.893 PRESSURE ULCER OF OTHER SITE, STAGE 3: ICD-10-CM

## 2021-01-15 DIAGNOSIS — E11.40 TYPE 2 DIABETES MELLITUS WITH DIABETIC NEUROPATHY, WITHOUT LONG-TERM CURRENT USE OF INSULIN: Primary | ICD-10-CM

## 2021-01-15 DIAGNOSIS — Z12.39 ENCOUNTER FOR SCREENING FOR MALIGNANT NEOPLASM OF BREAST, UNSPECIFIED SCREENING MODALITY: ICD-10-CM

## 2021-01-15 DIAGNOSIS — I48.0 PAROXYSMAL ATRIAL FIBRILLATION: ICD-10-CM

## 2021-01-15 DIAGNOSIS — Z79.899 MEDICATION MANAGEMENT: ICD-10-CM

## 2021-01-15 DIAGNOSIS — I50.22 CHRONIC SYSTOLIC CONGESTIVE HEART FAILURE: ICD-10-CM

## 2021-01-15 DIAGNOSIS — J42 CHRONIC BRONCHITIS, UNSPECIFIED CHRONIC BRONCHITIS TYPE: ICD-10-CM

## 2021-01-15 DIAGNOSIS — R52 PAIN: ICD-10-CM

## 2021-01-15 DIAGNOSIS — F19.20 OTHER PSYCHOACTIVE SUBSTANCE DEPENDENCE, UNCOMPLICATED: ICD-10-CM

## 2021-01-15 DIAGNOSIS — Z78.0 ASYMPTOMATIC MENOPAUSAL STATE: ICD-10-CM

## 2021-01-15 DIAGNOSIS — S22.42XA CLOSED FRACTURE OF MULTIPLE RIBS OF LEFT SIDE, INITIAL ENCOUNTER: Primary | ICD-10-CM

## 2021-01-15 DIAGNOSIS — F33.9 RECURRENT MAJOR DEPRESSIVE DISORDER, REMISSION STATUS UNSPECIFIED: ICD-10-CM

## 2021-01-15 PROCEDURE — 99214 PR OFFICE/OUTPT VISIT, EST, LEVL IV, 30-39 MIN: ICD-10-PCS | Mod: S$GLB,,, | Performed by: PHYSICIAN ASSISTANT

## 2021-01-15 PROCEDURE — 3075F SYST BP GE 130 - 139MM HG: CPT | Mod: CPTII,S$GLB,, | Performed by: PHYSICIAN ASSISTANT

## 2021-01-15 PROCEDURE — 1157F PR ADVANCE CARE PLAN OR EQUIV PRESENT IN MEDICAL RECORD: ICD-10-PCS | Mod: S$GLB,,, | Performed by: PHYSICIAN ASSISTANT

## 2021-01-15 PROCEDURE — 3044F PR MOST RECENT HEMOGLOBIN A1C LEVEL <7.0%: ICD-10-PCS | Mod: CPTII,S$GLB,, | Performed by: PHYSICIAN ASSISTANT

## 2021-01-15 PROCEDURE — 99499 RISK ADDL DX/OHS AUDIT: ICD-10-PCS | Mod: HCNC,S$GLB,, | Performed by: PHYSICIAN ASSISTANT

## 2021-01-15 PROCEDURE — 3075F PR MOST RECENT SYSTOLIC BLOOD PRESS GE 130-139MM HG: ICD-10-PCS | Mod: CPTII,S$GLB,, | Performed by: PHYSICIAN ASSISTANT

## 2021-01-15 PROCEDURE — 99214 OFFICE O/P EST MOD 30 MIN: CPT | Mod: S$GLB,,, | Performed by: PHYSICIAN ASSISTANT

## 2021-01-15 PROCEDURE — 99999 PR PBB SHADOW E&M-EST. PATIENT-LVL V: CPT | Mod: PBBFAC,,, | Performed by: PHYSICIAN ASSISTANT

## 2021-01-15 PROCEDURE — 1101F PT FALLS ASSESS-DOCD LE1/YR: CPT | Mod: CPTII,S$GLB,, | Performed by: PHYSICIAN ASSISTANT

## 2021-01-15 PROCEDURE — 3288F PR FALLS RISK ASSESSMENT DOCUMENTED: ICD-10-PCS | Mod: CPTII,S$GLB,, | Performed by: PHYSICIAN ASSISTANT

## 2021-01-15 PROCEDURE — 1159F MED LIST DOCD IN RCRD: CPT | Mod: S$GLB,,, | Performed by: PHYSICIAN ASSISTANT

## 2021-01-15 PROCEDURE — 25000003 PHARM REV CODE 250: Performed by: EMERGENCY MEDICINE

## 2021-01-15 PROCEDURE — 3008F BODY MASS INDEX DOCD: CPT | Mod: CPTII,S$GLB,, | Performed by: PHYSICIAN ASSISTANT

## 2021-01-15 PROCEDURE — 1157F ADVNC CARE PLAN IN RCRD: CPT | Mod: S$GLB,,, | Performed by: PHYSICIAN ASSISTANT

## 2021-01-15 PROCEDURE — 3078F PR MOST RECENT DIASTOLIC BLOOD PRESSURE < 80 MM HG: ICD-10-PCS | Mod: CPTII,S$GLB,, | Performed by: PHYSICIAN ASSISTANT

## 2021-01-15 PROCEDURE — 1125F PR PAIN SEVERITY QUANTIFIED, PAIN PRESENT: ICD-10-PCS | Mod: S$GLB,,, | Performed by: PHYSICIAN ASSISTANT

## 2021-01-15 PROCEDURE — 1125F AMNT PAIN NOTED PAIN PRSNT: CPT | Mod: S$GLB,,, | Performed by: PHYSICIAN ASSISTANT

## 2021-01-15 PROCEDURE — 1159F PR MEDICATION LIST DOCUMENTED IN MEDICAL RECORD: ICD-10-PCS | Mod: S$GLB,,, | Performed by: PHYSICIAN ASSISTANT

## 2021-01-15 PROCEDURE — 3008F PR BODY MASS INDEX (BMI) DOCUMENTED: ICD-10-PCS | Mod: CPTII,S$GLB,, | Performed by: PHYSICIAN ASSISTANT

## 2021-01-15 PROCEDURE — 1101F PR PT FALLS ASSESS DOC 0-1 FALLS W/OUT INJ PAST YR: ICD-10-PCS | Mod: CPTII,S$GLB,, | Performed by: PHYSICIAN ASSISTANT

## 2021-01-15 PROCEDURE — 3288F FALL RISK ASSESSMENT DOCD: CPT | Mod: CPTII,S$GLB,, | Performed by: PHYSICIAN ASSISTANT

## 2021-01-15 PROCEDURE — 3078F DIAST BP <80 MM HG: CPT | Mod: CPTII,S$GLB,, | Performed by: PHYSICIAN ASSISTANT

## 2021-01-15 PROCEDURE — 80307 DRUG TEST PRSMV CHEM ANLYZR: CPT

## 2021-01-15 PROCEDURE — 99999 PR PBB SHADOW E&M-EST. PATIENT-LVL V: ICD-10-PCS | Mod: PBBFAC,,, | Performed by: PHYSICIAN ASSISTANT

## 2021-01-15 PROCEDURE — 3044F HG A1C LEVEL LT 7.0%: CPT | Mod: CPTII,S$GLB,, | Performed by: PHYSICIAN ASSISTANT

## 2021-01-15 PROCEDURE — 99499 UNLISTED E&M SERVICE: CPT | Mod: HCNC,S$GLB,, | Performed by: PHYSICIAN ASSISTANT

## 2021-01-15 PROCEDURE — 99284 EMERGENCY DEPT VISIT MOD MDM: CPT | Mod: 25

## 2021-01-15 RX ORDER — LIDOCAINE 50 MG/G
1 PATCH TOPICAL DAILY
Qty: 7 PATCH | Refills: 0 | Status: SHIPPED | OUTPATIENT
Start: 2021-01-15 | End: 2021-01-22

## 2021-01-15 RX ORDER — METHOCARBAMOL 750 MG/1
750 TABLET, FILM COATED ORAL
Status: COMPLETED | OUTPATIENT
Start: 2021-01-15 | End: 2021-01-15

## 2021-01-15 RX ORDER — LIDOCAINE 50 MG/G
1 PATCH TOPICAL
Status: DISCONTINUED | OUTPATIENT
Start: 2021-01-15 | End: 2021-01-16 | Stop reason: HOSPADM

## 2021-01-15 RX ORDER — HYDROCODONE BITARTRATE AND ACETAMINOPHEN 10; 325 MG/1; MG/1
2 TABLET ORAL
Status: COMPLETED | OUTPATIENT
Start: 2021-01-15 | End: 2021-01-15

## 2021-01-15 RX ORDER — METHOCARBAMOL 500 MG/1
1000 TABLET, FILM COATED ORAL 3 TIMES DAILY
Qty: 30 TABLET | Refills: 0 | Status: SHIPPED | OUTPATIENT
Start: 2021-01-15 | End: 2021-01-20

## 2021-01-15 RX ADMIN — LIDOCAINE 1 PATCH: 50 PATCH TOPICAL at 10:01

## 2021-01-15 RX ADMIN — HYDROCODONE BITARTRATE AND ACETAMINOPHEN 2 TABLET: 10; 325 TABLET ORAL at 09:01

## 2021-01-15 RX ADMIN — METHOCARBAMOL TABLETS 750 MG: 750 TABLET, COATED ORAL at 10:01

## 2021-01-19 ENCOUNTER — DOCUMENTATION ONLY (OUTPATIENT)
Dept: FAMILY MEDICINE | Facility: CLINIC | Age: 71
End: 2021-01-19

## 2021-01-19 ENCOUNTER — TELEPHONE (OUTPATIENT)
Dept: FAMILY MEDICINE | Facility: CLINIC | Age: 71
End: 2021-01-19

## 2021-01-19 LAB
6MAM UR QL: NOT DETECTED
7AMINOCLONAZEPAM UR QL: NOT DETECTED
A-OH ALPRAZ UR QL: NOT DETECTED
ALPRAZ UR QL: NOT DETECTED
AMPHET UR QL SCN: NOT DETECTED
ANNOTATION COMMENT IMP: NORMAL
ANNOTATION COMMENT IMP: NORMAL
BARBITURATES UR QL: NOT DETECTED
BUPRENORPHINE UR QL: NOT DETECTED
BZE UR QL: NOT DETECTED
CARBOXYTHC UR QL: NOT DETECTED
CARISOPRODOL UR QL: NOT DETECTED
CLONAZEPAM UR QL: NOT DETECTED
CODEINE UR QL: NOT DETECTED
CREAT UR-MCNC: 25.4 MG/DL (ref 20–400)
DIAZEPAM UR QL: NOT DETECTED
ETHYL GLUCURONIDE UR QL: NOT DETECTED
FENTANYL UR QL: NOT DETECTED
HYDROCODONE UR QL: PRESENT
HYDROMORPHONE UR QL: NOT DETECTED
LORAZEPAM UR QL: NOT DETECTED
MDA UR QL: NOT DETECTED
MDEA UR QL: NOT DETECTED
MDMA UR QL: NOT DETECTED
ME-PHENIDATE UR QL: NOT DETECTED
MEPERIDINE UR QL: NOT DETECTED
METHADONE UR QL: NOT DETECTED
METHAMPHET UR QL: NOT DETECTED
MIDAZOLAM UR QL SCN: NOT DETECTED
MORPHINE UR QL: NOT DETECTED
NORBUPRENORPHINE UR QL CFM: NOT DETECTED
NORDIAZEPAM UR QL: NOT DETECTED
NORFENTANYL UR QL: NOT DETECTED
NORHYDROCODONE UR QL CFM: PRESENT
NOROXYCODONE UR QL CFM: NOT DETECTED
NOROXYMORPHONE: NOT DETECTED
OXAZEPAM UR QL: NOT DETECTED
OXYCODONE UR QL: NOT DETECTED
OXYMORPHONE UR QL: NOT DETECTED
PATHOLOGY STUDY: NORMAL
PCP UR QL: NOT DETECTED
PHENTERMINE UR QL: NOT DETECTED
PROPOXYPH UR QL: NOT DETECTED
SERVICE CMNT-IMP: NORMAL
TAPENTADOL UR QL SCN: NOT DETECTED
TAPENTADOL-O-SULF: NOT DETECTED
TEMAZEPAM UR QL: NOT DETECTED
TRAMADOL UR QL: NOT DETECTED
ZOLPIDEM UR QL: NOT DETECTED

## 2021-01-19 RX ORDER — HYDROCODONE BITARTRATE AND ACETAMINOPHEN 10; 325 MG/1; MG/1
1 TABLET ORAL EVERY 12 HOURS PRN
Qty: 60 TABLET | Refills: 0 | Status: SHIPPED | OUTPATIENT
Start: 2021-02-28 | End: 2021-04-15

## 2021-01-19 RX ORDER — CLONAZEPAM 0.5 MG/1
0.5 TABLET ORAL 2 TIMES DAILY PRN
Qty: 60 TABLET | Refills: 2 | Status: SHIPPED | OUTPATIENT
Start: 2021-01-29 | End: 2021-04-15 | Stop reason: SDUPTHER

## 2021-01-19 RX ORDER — HYDROCODONE BITARTRATE AND ACETAMINOPHEN 10; 325 MG/1; MG/1
1 TABLET ORAL EVERY 12 HOURS PRN
Qty: 60 TABLET | Refills: 0 | Status: SHIPPED | OUTPATIENT
Start: 2021-01-29 | End: 2021-02-26 | Stop reason: SDUPTHER

## 2021-01-19 RX ORDER — HYDROCODONE BITARTRATE AND ACETAMINOPHEN 10; 325 MG/1; MG/1
1 TABLET ORAL EVERY 12 HOURS PRN
Qty: 60 TABLET | Refills: 0 | Status: SHIPPED | OUTPATIENT
Start: 2021-03-29 | End: 2021-04-15

## 2021-01-28 RX ORDER — DULOXETIN HYDROCHLORIDE 30 MG/1
CAPSULE, DELAYED RELEASE ORAL
Qty: 90 CAPSULE | Refills: 3 | Status: SHIPPED | OUTPATIENT
Start: 2021-01-28 | End: 2021-10-01 | Stop reason: ALTCHOICE

## 2021-02-02 ENCOUNTER — TELEPHONE (OUTPATIENT)
Dept: FAMILY MEDICINE | Facility: CLINIC | Age: 71
End: 2021-02-02

## 2021-02-15 ENCOUNTER — PES CALL (OUTPATIENT)
Dept: ADMINISTRATIVE | Facility: CLINIC | Age: 71
End: 2021-02-15

## 2021-02-19 ENCOUNTER — TELEPHONE (OUTPATIENT)
Dept: FAMILY MEDICINE | Facility: CLINIC | Age: 71
End: 2021-02-19

## 2021-02-19 DIAGNOSIS — F19.20 OTHER PSYCHOACTIVE SUBSTANCE DEPENDENCE, UNCOMPLICATED: Primary | ICD-10-CM

## 2021-02-19 RX ORDER — NALOXONE HYDROCHLORIDE 4 MG/.1ML
1 SPRAY NASAL ONCE
Qty: 1 EACH | Refills: 3 | Status: SHIPPED | OUTPATIENT
Start: 2021-02-19 | End: 2021-02-19

## 2021-02-26 DIAGNOSIS — M43.12 SPONDYLOLISTHESIS OF CERVICAL REGION: ICD-10-CM

## 2021-02-26 DIAGNOSIS — M51.36 LUMBAR DEGENERATIVE DISC DISEASE: ICD-10-CM

## 2021-02-26 DIAGNOSIS — E11.40 TYPE 2 DIABETES MELLITUS WITH DIABETIC NEUROPATHY, WITHOUT LONG-TERM CURRENT USE OF INSULIN: ICD-10-CM

## 2021-02-26 DIAGNOSIS — J43.9 PULMONARY EMPHYSEMA, UNSPECIFIED EMPHYSEMA TYPE: ICD-10-CM

## 2021-03-14 RX ORDER — HYDROCODONE BITARTRATE AND ACETAMINOPHEN 10; 325 MG/1; MG/1
1 TABLET ORAL EVERY 12 HOURS PRN
Qty: 60 TABLET | Refills: 0 | Status: SHIPPED | OUTPATIENT
Start: 2021-03-14 | End: 2021-04-15

## 2021-03-14 RX ORDER — LEVALBUTEROL INHALATION SOLUTION 0.63 MG/3ML
SOLUTION RESPIRATORY (INHALATION)
Qty: 792 ML | Refills: 0 | Status: SHIPPED | OUTPATIENT
Start: 2021-03-14 | End: 2021-03-19 | Stop reason: SDUPTHER

## 2021-03-17 DIAGNOSIS — E11.9 TYPE 2 DIABETES MELLITUS WITHOUT COMPLICATION: ICD-10-CM

## 2021-03-18 DIAGNOSIS — I15.2 HYPERTENSION ASSOCIATED WITH DIABETES: ICD-10-CM

## 2021-03-18 DIAGNOSIS — E11.59 HYPERTENSION ASSOCIATED WITH DIABETES: ICD-10-CM

## 2021-03-18 DIAGNOSIS — J41.8 MIXED SIMPLE AND MUCOPURULENT CHRONIC BRONCHITIS: ICD-10-CM

## 2021-03-19 DIAGNOSIS — J43.9 PULMONARY EMPHYSEMA, UNSPECIFIED EMPHYSEMA TYPE: ICD-10-CM

## 2021-03-19 RX ORDER — LEVALBUTEROL INHALATION SOLUTION 0.63 MG/3ML
SOLUTION RESPIRATORY (INHALATION)
Qty: 792 ML | Refills: 3 | Status: SHIPPED | OUTPATIENT
Start: 2021-03-19 | End: 2021-07-27 | Stop reason: SDUPTHER

## 2021-03-19 RX ORDER — ATORVASTATIN CALCIUM 20 MG/1
TABLET, FILM COATED ORAL
Qty: 90 TABLET | Refills: 1 | Status: SHIPPED | OUTPATIENT
Start: 2021-03-19 | End: 2021-07-27 | Stop reason: SDUPTHER

## 2021-03-19 RX ORDER — FLUTICASONE FUROATE AND VILANTEROL TRIFENATATE 100; 25 UG/1; UG/1
POWDER RESPIRATORY (INHALATION)
Qty: 60 EACH | Refills: 1 | Status: SHIPPED | OUTPATIENT
Start: 2021-03-19 | End: 2021-12-27 | Stop reason: ALTCHOICE

## 2021-03-31 DIAGNOSIS — R09.81 CONGESTION OF NASAL SINUS: ICD-10-CM

## 2021-03-31 RX ORDER — FLUTICASONE PROPIONATE 50 MCG
1 SPRAY, SUSPENSION (ML) NASAL DAILY
Qty: 1 G | Refills: 3 | Status: SHIPPED | OUTPATIENT
Start: 2021-03-31 | End: 2021-04-15 | Stop reason: SDUPTHER

## 2021-04-05 ENCOUNTER — PATIENT MESSAGE (OUTPATIENT)
Dept: ADMINISTRATIVE | Facility: HOSPITAL | Age: 71
End: 2021-04-05

## 2021-04-08 ENCOUNTER — PATIENT MESSAGE (OUTPATIENT)
Dept: FAMILY MEDICINE | Facility: CLINIC | Age: 71
End: 2021-04-08

## 2021-04-15 ENCOUNTER — OFFICE VISIT (OUTPATIENT)
Dept: FAMILY MEDICINE | Facility: CLINIC | Age: 71
End: 2021-04-15
Payer: MEDICARE

## 2021-04-15 ENCOUNTER — HOSPITAL ENCOUNTER (OUTPATIENT)
Dept: RADIOLOGY | Facility: CLINIC | Age: 71
Discharge: HOME | End: 2021-04-15
Attending: FAMILY MEDICINE
Payer: MEDICARE

## 2021-04-15 ENCOUNTER — PATIENT MESSAGE (OUTPATIENT)
Dept: FAMILY MEDICINE | Facility: CLINIC | Age: 71
End: 2021-04-15

## 2021-04-15 VITALS
SYSTOLIC BLOOD PRESSURE: 126 MMHG | HEART RATE: 68 BPM | DIASTOLIC BLOOD PRESSURE: 74 MMHG | OXYGEN SATURATION: 96 % | HEIGHT: 68 IN | WEIGHT: 238.56 LBS | BODY MASS INDEX: 36.16 KG/M2

## 2021-04-15 DIAGNOSIS — I48.0 PAROXYSMAL ATRIAL FIBRILLATION: ICD-10-CM

## 2021-04-15 DIAGNOSIS — F17.200 TOBACCO DEPENDENCY: ICD-10-CM

## 2021-04-15 DIAGNOSIS — R06.00 DYSPNEA, UNSPECIFIED TYPE: ICD-10-CM

## 2021-04-15 DIAGNOSIS — K45.8 FLANK HERNIA: ICD-10-CM

## 2021-04-15 DIAGNOSIS — J42 CHRONIC BRONCHITIS, UNSPECIFIED CHRONIC BRONCHITIS TYPE: ICD-10-CM

## 2021-04-15 DIAGNOSIS — M51.36 LUMBAR DEGENERATIVE DISC DISEASE: ICD-10-CM

## 2021-04-15 DIAGNOSIS — R09.81 CONGESTION OF NASAL SINUS: ICD-10-CM

## 2021-04-15 DIAGNOSIS — F33.9 RECURRENT MAJOR DEPRESSIVE DISORDER, REMISSION STATUS UNSPECIFIED: ICD-10-CM

## 2021-04-15 DIAGNOSIS — J96.12 HYPERCAPNIC RESPIRATORY FAILURE, CHRONIC: ICD-10-CM

## 2021-04-15 DIAGNOSIS — E11.40 TYPE 2 DIABETES MELLITUS WITH DIABETIC NEUROPATHY, WITHOUT LONG-TERM CURRENT USE OF INSULIN: Primary | ICD-10-CM

## 2021-04-15 DIAGNOSIS — K21.9 GASTROESOPHAGEAL REFLUX DISEASE WITHOUT ESOPHAGITIS: ICD-10-CM

## 2021-04-15 DIAGNOSIS — I50.22 CHRONIC SYSTOLIC CONGESTIVE HEART FAILURE: ICD-10-CM

## 2021-04-15 PROCEDURE — 1159F PR MEDICATION LIST DOCUMENTED IN MEDICAL RECORD: ICD-10-PCS | Mod: S$GLB,,, | Performed by: FAMILY MEDICINE

## 2021-04-15 PROCEDURE — 71046 X-RAY EXAM CHEST 2 VIEWS: CPT | Mod: 26,HCNC,, | Performed by: RADIOLOGY

## 2021-04-15 PROCEDURE — 1125F PR PAIN SEVERITY QUANTIFIED, PAIN PRESENT: ICD-10-PCS | Mod: S$GLB,,, | Performed by: FAMILY MEDICINE

## 2021-04-15 PROCEDURE — 3044F PR MOST RECENT HEMOGLOBIN A1C LEVEL <7.0%: ICD-10-PCS | Mod: CPTII,S$GLB,, | Performed by: FAMILY MEDICINE

## 2021-04-15 PROCEDURE — 1159F MED LIST DOCD IN RCRD: CPT | Mod: S$GLB,,, | Performed by: FAMILY MEDICINE

## 2021-04-15 PROCEDURE — 3008F PR BODY MASS INDEX (BMI) DOCUMENTED: ICD-10-PCS | Mod: CPTII,S$GLB,, | Performed by: FAMILY MEDICINE

## 2021-04-15 PROCEDURE — 3288F FALL RISK ASSESSMENT DOCD: CPT | Mod: CPTII,S$GLB,, | Performed by: FAMILY MEDICINE

## 2021-04-15 PROCEDURE — 99214 PR OFFICE/OUTPT VISIT, EST, LEVL IV, 30-39 MIN: ICD-10-PCS | Mod: S$GLB,,, | Performed by: FAMILY MEDICINE

## 2021-04-15 PROCEDURE — 3044F HG A1C LEVEL LT 7.0%: CPT | Mod: CPTII,S$GLB,, | Performed by: FAMILY MEDICINE

## 2021-04-15 PROCEDURE — 1100F PR PT FALLS ASSESS DOC 2+ FALLS/FALL W/INJURY/YR: ICD-10-PCS | Mod: CPTII,S$GLB,, | Performed by: FAMILY MEDICINE

## 2021-04-15 PROCEDURE — 99999 PR PBB SHADOW E&M-EST. PATIENT-LVL V: ICD-10-PCS | Mod: PBBFAC,,, | Performed by: FAMILY MEDICINE

## 2021-04-15 PROCEDURE — 3008F BODY MASS INDEX DOCD: CPT | Mod: CPTII,S$GLB,, | Performed by: FAMILY MEDICINE

## 2021-04-15 PROCEDURE — 99499 RISK ADDL DX/OHS AUDIT: ICD-10-PCS | Mod: HCNC,S$GLB,, | Performed by: FAMILY MEDICINE

## 2021-04-15 PROCEDURE — 1125F AMNT PAIN NOTED PAIN PRSNT: CPT | Mod: S$GLB,,, | Performed by: FAMILY MEDICINE

## 2021-04-15 PROCEDURE — 3288F PR FALLS RISK ASSESSMENT DOCUMENTED: ICD-10-PCS | Mod: CPTII,S$GLB,, | Performed by: FAMILY MEDICINE

## 2021-04-15 PROCEDURE — 99999 PR PBB SHADOW E&M-EST. PATIENT-LVL V: CPT | Mod: PBBFAC,,, | Performed by: FAMILY MEDICINE

## 2021-04-15 PROCEDURE — 1157F PR ADVANCE CARE PLAN OR EQUIV PRESENT IN MEDICAL RECORD: ICD-10-PCS | Mod: S$GLB,,, | Performed by: FAMILY MEDICINE

## 2021-04-15 PROCEDURE — 71046 XR CHEST PA AND LATERAL: ICD-10-PCS | Mod: 26,HCNC,, | Performed by: RADIOLOGY

## 2021-04-15 PROCEDURE — 99214 OFFICE O/P EST MOD 30 MIN: CPT | Mod: S$GLB,,, | Performed by: FAMILY MEDICINE

## 2021-04-15 PROCEDURE — 1100F PTFALLS ASSESS-DOCD GE2>/YR: CPT | Mod: CPTII,S$GLB,, | Performed by: FAMILY MEDICINE

## 2021-04-15 PROCEDURE — 71046 X-RAY EXAM CHEST 2 VIEWS: CPT | Mod: TC,HCNC,FY,PO

## 2021-04-15 PROCEDURE — 1157F ADVNC CARE PLAN IN RCRD: CPT | Mod: S$GLB,,, | Performed by: FAMILY MEDICINE

## 2021-04-15 PROCEDURE — 99499 UNLISTED E&M SERVICE: CPT | Mod: HCNC,S$GLB,, | Performed by: FAMILY MEDICINE

## 2021-04-15 RX ORDER — CLONAZEPAM 0.5 MG/1
0.5 TABLET ORAL 2 TIMES DAILY PRN
Qty: 60 TABLET | Refills: 2 | Status: SHIPPED | OUTPATIENT
Start: 2021-04-15 | End: 2021-07-27 | Stop reason: SDUPTHER

## 2021-04-15 RX ORDER — FLUTICASONE PROPIONATE 50 MCG
1 SPRAY, SUSPENSION (ML) NASAL DAILY
Qty: 1 G | Refills: 3 | Status: SHIPPED | OUTPATIENT
Start: 2021-04-15 | End: 2021-10-05

## 2021-04-15 RX ORDER — HYDROCODONE BITARTRATE AND ACETAMINOPHEN 7.5; 325 MG/1; MG/1
1 TABLET ORAL EVERY 12 HOURS PRN
Qty: 60 TABLET | Refills: 0 | Status: SHIPPED | OUTPATIENT
Start: 2021-06-14 | End: 2021-07-09 | Stop reason: SDUPTHER

## 2021-04-15 RX ORDER — HYDROCODONE BITARTRATE AND ACETAMINOPHEN 7.5; 325 MG/1; MG/1
1 TABLET ORAL EVERY 12 HOURS PRN
Qty: 60 TABLET | Refills: 0 | Status: SHIPPED | OUTPATIENT
Start: 2021-05-14 | End: 2021-07-13

## 2021-04-15 RX ORDER — LIDOCAINE 50 MG/G
PATCH TOPICAL
Qty: 60 PATCH | Refills: 3 | Status: SHIPPED | OUTPATIENT
Start: 2021-04-15 | End: 2021-08-13 | Stop reason: SDUPTHER

## 2021-04-15 RX ORDER — HYDROCODONE BITARTRATE AND ACETAMINOPHEN 7.5; 325 MG/1; MG/1
1 TABLET ORAL EVERY 12 HOURS PRN
Qty: 60 TABLET | Refills: 0 | Status: SHIPPED | OUTPATIENT
Start: 2021-04-14 | End: 2021-04-29 | Stop reason: SDUPTHER

## 2021-04-15 RX ORDER — SUCRALFATE 1 G/1
1 TABLET ORAL 2 TIMES DAILY
Qty: 180 TABLET | Refills: 4 | Status: SHIPPED | OUTPATIENT
Start: 2021-04-15 | End: 2021-10-01 | Stop reason: ALTCHOICE

## 2021-04-15 RX ORDER — AMITRIPTYLINE HYDROCHLORIDE 50 MG/1
50 TABLET, FILM COATED ORAL NIGHTLY PRN
Qty: 30 TABLET | Refills: 3 | Status: SHIPPED | OUTPATIENT
Start: 2021-04-15 | End: 2021-10-01 | Stop reason: ALTCHOICE

## 2021-04-19 ENCOUNTER — TELEPHONE (OUTPATIENT)
Dept: FAMILY MEDICINE | Facility: CLINIC | Age: 71
End: 2021-04-19

## 2021-04-19 ENCOUNTER — OUTPATIENT CASE MANAGEMENT (OUTPATIENT)
Dept: ADMINISTRATIVE | Facility: OTHER | Age: 71
End: 2021-04-19

## 2021-04-19 ENCOUNTER — PATIENT MESSAGE (OUTPATIENT)
Dept: FAMILY MEDICINE | Facility: CLINIC | Age: 71
End: 2021-04-19

## 2021-04-29 ENCOUNTER — PATIENT MESSAGE (OUTPATIENT)
Dept: FAMILY MEDICINE | Facility: CLINIC | Age: 71
End: 2021-04-29

## 2021-04-29 DIAGNOSIS — M51.36 LUMBAR DEGENERATIVE DISC DISEASE: ICD-10-CM

## 2021-04-29 RX ORDER — HYDROCODONE BITARTRATE AND ACETAMINOPHEN 7.5; 325 MG/1; MG/1
1 TABLET ORAL EVERY 12 HOURS PRN
Qty: 60 TABLET | Refills: 0 | Status: SHIPPED | OUTPATIENT
Start: 2021-05-12 | End: 2021-07-11

## 2021-05-03 ENCOUNTER — OUTPATIENT CASE MANAGEMENT (OUTPATIENT)
Dept: ADMINISTRATIVE | Facility: OTHER | Age: 71
End: 2021-05-03

## 2021-05-03 DIAGNOSIS — B37.89 CANDIDIASIS OF BREAST: Primary | ICD-10-CM

## 2021-05-06 RX ORDER — NYSTATIN 100000 [USP'U]/G
POWDER TOPICAL 2 TIMES DAILY
Qty: 120 G | Refills: 11 | Status: SHIPPED | OUTPATIENT
Start: 2021-05-06 | End: 2021-10-08 | Stop reason: SDUPTHER

## 2021-05-06 RX ORDER — ALBUTEROL SULFATE 90 UG/1
2 AEROSOL, METERED RESPIRATORY (INHALATION) EVERY 6 HOURS PRN
Qty: 54 G | Refills: 3 | Status: SHIPPED | OUTPATIENT
Start: 2021-05-06 | End: 2021-12-24 | Stop reason: SDUPTHER

## 2021-05-07 ENCOUNTER — TELEPHONE (OUTPATIENT)
Dept: FAMILY MEDICINE | Facility: CLINIC | Age: 71
End: 2021-05-07

## 2021-05-10 ENCOUNTER — TELEPHONE (OUTPATIENT)
Dept: FAMILY MEDICINE | Facility: CLINIC | Age: 71
End: 2021-05-10

## 2021-05-14 ENCOUNTER — OUTPATIENT CASE MANAGEMENT (OUTPATIENT)
Dept: ADMINISTRATIVE | Facility: OTHER | Age: 71
End: 2021-05-14

## 2021-05-27 ENCOUNTER — OUTPATIENT CASE MANAGEMENT (OUTPATIENT)
Dept: ADMINISTRATIVE | Facility: OTHER | Age: 71
End: 2021-05-27

## 2021-06-01 ENCOUNTER — OUTPATIENT CASE MANAGEMENT (OUTPATIENT)
Dept: ADMINISTRATIVE | Facility: OTHER | Age: 71
End: 2021-06-01

## 2021-06-10 ENCOUNTER — OUTPATIENT CASE MANAGEMENT (OUTPATIENT)
Dept: ADMINISTRATIVE | Facility: OTHER | Age: 71
End: 2021-06-10

## 2021-06-11 RX ORDER — NYSTATIN AND TRIAMCINOLONE ACETONIDE 100000; 1 [USP'U]/G; MG/G
CREAM TOPICAL 3 TIMES DAILY
Qty: 30 G | Refills: 3 | Status: SHIPPED | OUTPATIENT
Start: 2021-06-11 | End: 2021-08-09 | Stop reason: SDUPTHER

## 2021-06-14 DIAGNOSIS — J42 CHRONIC BRONCHITIS, UNSPECIFIED CHRONIC BRONCHITIS TYPE: ICD-10-CM

## 2021-06-14 RX ORDER — FLUTICASONE FUROATE, UMECLIDINIUM BROMIDE AND VILANTEROL TRIFENATATE 100; 62.5; 25 UG/1; UG/1; UG/1
POWDER RESPIRATORY (INHALATION)
Qty: 180 EACH | Refills: 3 | Status: SHIPPED | OUTPATIENT
Start: 2021-06-14 | End: 2021-12-04 | Stop reason: SDUPTHER

## 2021-06-24 ENCOUNTER — OUTPATIENT CASE MANAGEMENT (OUTPATIENT)
Dept: ADMINISTRATIVE | Facility: OTHER | Age: 71
End: 2021-06-24

## 2021-07-03 DIAGNOSIS — I10 HYPERTENSION, UNSPECIFIED TYPE: ICD-10-CM

## 2021-07-06 DIAGNOSIS — I10 HYPERTENSION, UNSPECIFIED TYPE: ICD-10-CM

## 2021-07-07 ENCOUNTER — PATIENT MESSAGE (OUTPATIENT)
Dept: ADMINISTRATIVE | Facility: HOSPITAL | Age: 71
End: 2021-07-07

## 2021-07-09 ENCOUNTER — PATIENT MESSAGE (OUTPATIENT)
Dept: FAMILY MEDICINE | Facility: CLINIC | Age: 71
End: 2021-07-09

## 2021-07-09 DIAGNOSIS — M51.36 LUMBAR DEGENERATIVE DISC DISEASE: ICD-10-CM

## 2021-07-09 RX ORDER — HYDROCODONE BITARTRATE AND ACETAMINOPHEN 7.5; 325 MG/1; MG/1
1 TABLET ORAL NIGHTLY PRN
Qty: 60 TABLET | Refills: 0 | Status: SHIPPED | OUTPATIENT
Start: 2021-07-09 | End: 2021-07-20 | Stop reason: SDUPTHER

## 2021-07-20 ENCOUNTER — LAB VISIT (OUTPATIENT)
Dept: LAB | Facility: HOSPITAL | Age: 71
End: 2021-07-20
Attending: PHYSICIAN ASSISTANT
Payer: MEDICARE

## 2021-07-20 ENCOUNTER — OFFICE VISIT (OUTPATIENT)
Dept: FAMILY MEDICINE | Facility: CLINIC | Age: 71
End: 2021-07-20
Payer: MEDICARE

## 2021-07-20 VITALS
TEMPERATURE: 98 F | OXYGEN SATURATION: 98 % | WEIGHT: 239.44 LBS | SYSTOLIC BLOOD PRESSURE: 132 MMHG | HEART RATE: 63 BPM | HEIGHT: 68 IN | BODY MASS INDEX: 36.29 KG/M2 | RESPIRATION RATE: 16 BRPM | DIASTOLIC BLOOD PRESSURE: 76 MMHG

## 2021-07-20 DIAGNOSIS — E11.59 HYPERTENSION ASSOCIATED WITH DIABETES: ICD-10-CM

## 2021-07-20 DIAGNOSIS — E11.40 TYPE 2 DIABETES MELLITUS WITH DIABETIC NEUROPATHY, WITHOUT LONG-TERM CURRENT USE OF INSULIN: ICD-10-CM

## 2021-07-20 DIAGNOSIS — I15.2 HYPERTENSION ASSOCIATED WITH DIABETES: ICD-10-CM

## 2021-07-20 DIAGNOSIS — J01.00 ACUTE MAXILLARY SINUSITIS, RECURRENCE NOT SPECIFIED: Primary | ICD-10-CM

## 2021-07-20 DIAGNOSIS — M51.36 LUMBAR DEGENERATIVE DISC DISEASE: ICD-10-CM

## 2021-07-20 DIAGNOSIS — M51.36 DDD (DEGENERATIVE DISC DISEASE), LUMBAR: Chronic | ICD-10-CM

## 2021-07-20 LAB
BASOPHILS # BLD AUTO: 0.08 K/UL (ref 0–0.2)
BASOPHILS NFR BLD: 1.6 % (ref 0–1.9)
DIFFERENTIAL METHOD: ABNORMAL
EOSINOPHIL # BLD AUTO: 0.5 K/UL (ref 0–0.5)
EOSINOPHIL NFR BLD: 8.9 % (ref 0–8)
ERYTHROCYTE [DISTWIDTH] IN BLOOD BY AUTOMATED COUNT: 16.7 % (ref 11.5–14.5)
ESTIMATED AVG GLUCOSE: 100 MG/DL (ref 68–131)
HBA1C MFR BLD: 5.1 % (ref 4–5.6)
HCT VFR BLD AUTO: 47.4 % (ref 37–48.5)
HGB BLD-MCNC: 13.6 G/DL (ref 12–16)
IMM GRANULOCYTES # BLD AUTO: 0.02 K/UL (ref 0–0.04)
IMM GRANULOCYTES NFR BLD AUTO: 0.4 % (ref 0–0.5)
LYMPHOCYTES # BLD AUTO: 1.4 K/UL (ref 1–4.8)
LYMPHOCYTES NFR BLD: 26.9 % (ref 18–48)
MCH RBC QN AUTO: 25.6 PG (ref 27–31)
MCHC RBC AUTO-ENTMCNC: 28.7 G/DL (ref 32–36)
MCV RBC AUTO: 89 FL (ref 82–98)
MONOCYTES # BLD AUTO: 0.9 K/UL (ref 0.3–1)
MONOCYTES NFR BLD: 17.6 % (ref 4–15)
NEUTROPHILS # BLD AUTO: 2.3 K/UL (ref 1.8–7.7)
NEUTROPHILS NFR BLD: 44.6 % (ref 38–73)
NRBC BLD-RTO: 0 /100 WBC
PLATELET # BLD AUTO: 159 K/UL (ref 150–450)
PMV BLD AUTO: 11.3 FL (ref 9.2–12.9)
RBC # BLD AUTO: 5.32 M/UL (ref 4–5.4)
WBC # BLD AUTO: 5.06 K/UL (ref 3.9–12.7)

## 2021-07-20 PROCEDURE — 1159F MED LIST DOCD IN RCRD: CPT | Mod: HCNC,CPTII,S$GLB, | Performed by: PHYSICIAN ASSISTANT

## 2021-07-20 PROCEDURE — 3044F PR MOST RECENT HEMOGLOBIN A1C LEVEL <7.0%: ICD-10-PCS | Mod: HCNC,CPTII,S$GLB, | Performed by: PHYSICIAN ASSISTANT

## 2021-07-20 PROCEDURE — 1125F PR PAIN SEVERITY QUANTIFIED, PAIN PRESENT: ICD-10-PCS | Mod: HCNC,CPTII,S$GLB, | Performed by: PHYSICIAN ASSISTANT

## 2021-07-20 PROCEDURE — 3075F SYST BP GE 130 - 139MM HG: CPT | Mod: HCNC,CPTII,S$GLB, | Performed by: PHYSICIAN ASSISTANT

## 2021-07-20 PROCEDURE — 3075F PR MOST RECENT SYSTOLIC BLOOD PRESS GE 130-139MM HG: ICD-10-PCS | Mod: HCNC,CPTII,S$GLB, | Performed by: PHYSICIAN ASSISTANT

## 2021-07-20 PROCEDURE — 36415 COLL VENOUS BLD VENIPUNCTURE: CPT | Mod: HCNC,PO | Performed by: PHYSICIAN ASSISTANT

## 2021-07-20 PROCEDURE — 99499 UNLISTED E&M SERVICE: CPT | Mod: S$GLB,,, | Performed by: PHYSICIAN ASSISTANT

## 2021-07-20 PROCEDURE — 3078F DIAST BP <80 MM HG: CPT | Mod: HCNC,CPTII,S$GLB, | Performed by: PHYSICIAN ASSISTANT

## 2021-07-20 PROCEDURE — 3288F FALL RISK ASSESSMENT DOCD: CPT | Mod: HCNC,CPTII,S$GLB, | Performed by: PHYSICIAN ASSISTANT

## 2021-07-20 PROCEDURE — 80053 COMPREHEN METABOLIC PANEL: CPT | Mod: HCNC | Performed by: PHYSICIAN ASSISTANT

## 2021-07-20 PROCEDURE — 99999 PR PBB SHADOW E&M-EST. PATIENT-LVL V: ICD-10-PCS | Mod: PBBFAC,HCNC,, | Performed by: PHYSICIAN ASSISTANT

## 2021-07-20 PROCEDURE — 99214 PR OFFICE/OUTPT VISIT, EST, LEVL IV, 30-39 MIN: ICD-10-PCS | Mod: HCNC,S$GLB,, | Performed by: PHYSICIAN ASSISTANT

## 2021-07-20 PROCEDURE — 1157F ADVNC CARE PLAN IN RCRD: CPT | Mod: HCNC,CPTII,S$GLB, | Performed by: PHYSICIAN ASSISTANT

## 2021-07-20 PROCEDURE — 3078F PR MOST RECENT DIASTOLIC BLOOD PRESSURE < 80 MM HG: ICD-10-PCS | Mod: HCNC,CPTII,S$GLB, | Performed by: PHYSICIAN ASSISTANT

## 2021-07-20 PROCEDURE — 1159F PR MEDICATION LIST DOCUMENTED IN MEDICAL RECORD: ICD-10-PCS | Mod: HCNC,CPTII,S$GLB, | Performed by: PHYSICIAN ASSISTANT

## 2021-07-20 PROCEDURE — 99214 OFFICE O/P EST MOD 30 MIN: CPT | Mod: HCNC,S$GLB,, | Performed by: PHYSICIAN ASSISTANT

## 2021-07-20 PROCEDURE — 3008F BODY MASS INDEX DOCD: CPT | Mod: HCNC,CPTII,S$GLB, | Performed by: PHYSICIAN ASSISTANT

## 2021-07-20 PROCEDURE — 1125F AMNT PAIN NOTED PAIN PRSNT: CPT | Mod: HCNC,CPTII,S$GLB, | Performed by: PHYSICIAN ASSISTANT

## 2021-07-20 PROCEDURE — 99999 PR PBB SHADOW E&M-EST. PATIENT-LVL V: CPT | Mod: PBBFAC,HCNC,, | Performed by: PHYSICIAN ASSISTANT

## 2021-07-20 PROCEDURE — 3008F PR BODY MASS INDEX (BMI) DOCUMENTED: ICD-10-PCS | Mod: HCNC,CPTII,S$GLB, | Performed by: PHYSICIAN ASSISTANT

## 2021-07-20 PROCEDURE — 1157F PR ADVANCE CARE PLAN OR EQUIV PRESENT IN MEDICAL RECORD: ICD-10-PCS | Mod: HCNC,CPTII,S$GLB, | Performed by: PHYSICIAN ASSISTANT

## 2021-07-20 PROCEDURE — 1101F PR PT FALLS ASSESS DOC 0-1 FALLS W/OUT INJ PAST YR: ICD-10-PCS | Mod: HCNC,CPTII,S$GLB, | Performed by: PHYSICIAN ASSISTANT

## 2021-07-20 PROCEDURE — 99499 RISK ADDL DX/OHS AUDIT: ICD-10-PCS | Mod: S$GLB,,, | Performed by: PHYSICIAN ASSISTANT

## 2021-07-20 PROCEDURE — 3044F HG A1C LEVEL LT 7.0%: CPT | Mod: HCNC,CPTII,S$GLB, | Performed by: PHYSICIAN ASSISTANT

## 2021-07-20 PROCEDURE — 80061 LIPID PANEL: CPT | Mod: HCNC | Performed by: PHYSICIAN ASSISTANT

## 2021-07-20 PROCEDURE — 3288F PR FALLS RISK ASSESSMENT DOCUMENTED: ICD-10-PCS | Mod: HCNC,CPTII,S$GLB, | Performed by: PHYSICIAN ASSISTANT

## 2021-07-20 PROCEDURE — 83036 HEMOGLOBIN GLYCOSYLATED A1C: CPT | Mod: HCNC | Performed by: PHYSICIAN ASSISTANT

## 2021-07-20 PROCEDURE — 1101F PT FALLS ASSESS-DOCD LE1/YR: CPT | Mod: HCNC,CPTII,S$GLB, | Performed by: PHYSICIAN ASSISTANT

## 2021-07-20 PROCEDURE — 85025 COMPLETE CBC W/AUTO DIFF WBC: CPT | Mod: HCNC | Performed by: PHYSICIAN ASSISTANT

## 2021-07-20 RX ORDER — DOXYCYCLINE HYCLATE 100 MG
100 TABLET ORAL 2 TIMES DAILY
Qty: 20 TABLET | Refills: 0 | Status: SHIPPED | OUTPATIENT
Start: 2021-07-20 | End: 2021-07-30

## 2021-07-21 LAB
ALBUMIN SERPL BCP-MCNC: 3.8 G/DL (ref 3.5–5.2)
ALP SERPL-CCNC: 116 U/L (ref 55–135)
ALT SERPL W/O P-5'-P-CCNC: 19 U/L (ref 10–44)
ANION GAP SERPL CALC-SCNC: 10 MMOL/L (ref 8–16)
AST SERPL-CCNC: 26 U/L (ref 10–40)
BILIRUB SERPL-MCNC: 0.6 MG/DL (ref 0.1–1)
BUN SERPL-MCNC: 15 MG/DL (ref 8–23)
CALCIUM SERPL-MCNC: 9.7 MG/DL (ref 8.7–10.5)
CHLORIDE SERPL-SCNC: 95 MMOL/L (ref 95–110)
CHOLEST SERPL-MCNC: 100 MG/DL (ref 120–199)
CHOLEST/HDLC SERPL: 2.4 {RATIO} (ref 2–5)
CO2 SERPL-SCNC: 30 MMOL/L (ref 23–29)
CREAT SERPL-MCNC: 0.7 MG/DL (ref 0.5–1.4)
EST. GFR  (AFRICAN AMERICAN): >60 ML/MIN/1.73 M^2
EST. GFR  (NON AFRICAN AMERICAN): >60 ML/MIN/1.73 M^2
GLUCOSE SERPL-MCNC: 73 MG/DL (ref 70–110)
HDLC SERPL-MCNC: 42 MG/DL (ref 40–75)
HDLC SERPL: 42 % (ref 20–50)
LDLC SERPL CALC-MCNC: 49.4 MG/DL (ref 63–159)
NONHDLC SERPL-MCNC: 58 MG/DL
POTASSIUM SERPL-SCNC: 5.1 MMOL/L (ref 3.5–5.1)
PROT SERPL-MCNC: 7.7 G/DL (ref 6–8.4)
SODIUM SERPL-SCNC: 135 MMOL/L (ref 136–145)
TRIGL SERPL-MCNC: 43 MG/DL (ref 30–150)

## 2021-07-28 RX ORDER — HYDROCODONE BITARTRATE AND ACETAMINOPHEN 7.5; 325 MG/1; MG/1
1 TABLET ORAL NIGHTLY PRN
Qty: 60 TABLET | Refills: 0 | Status: SHIPPED | OUTPATIENT
Start: 2021-10-06 | End: 2021-10-01 | Stop reason: ALTCHOICE

## 2021-07-28 RX ORDER — HYDROCODONE BITARTRATE AND ACETAMINOPHEN 7.5; 325 MG/1; MG/1
1 TABLET ORAL NIGHTLY PRN
Qty: 60 TABLET | Refills: 0 | Status: SHIPPED | OUTPATIENT
Start: 2021-09-06 | End: 2021-09-07 | Stop reason: SDUPTHER

## 2021-07-28 RX ORDER — HYDROCODONE BITARTRATE AND ACETAMINOPHEN 7.5; 325 MG/1; MG/1
1 TABLET ORAL NIGHTLY PRN
Qty: 60 TABLET | Refills: 0 | Status: SHIPPED | OUTPATIENT
Start: 2021-08-06 | End: 2021-09-05

## 2021-08-10 RX ORDER — NYSTATIN AND TRIAMCINOLONE ACETONIDE 100000; 1 [USP'U]/G; MG/G
CREAM TOPICAL 3 TIMES DAILY
Qty: 30 G | Refills: 3 | Status: SHIPPED | OUTPATIENT
Start: 2021-08-10 | End: 2022-04-05 | Stop reason: SDUPTHER

## 2021-08-12 ENCOUNTER — PATIENT MESSAGE (OUTPATIENT)
Dept: FAMILY MEDICINE | Facility: CLINIC | Age: 71
End: 2021-08-12

## 2021-08-16 ENCOUNTER — TELEPHONE (OUTPATIENT)
Dept: ORTHOPEDICS | Facility: CLINIC | Age: 71
End: 2021-08-16

## 2021-08-18 ENCOUNTER — OFFICE VISIT (OUTPATIENT)
Dept: ORTHOPEDICS | Facility: CLINIC | Age: 71
End: 2021-08-18
Payer: MEDICARE

## 2021-08-18 VITALS — BODY MASS INDEX: 36.22 KG/M2 | RESPIRATION RATE: 18 BRPM | WEIGHT: 239 LBS | HEIGHT: 68 IN

## 2021-08-18 DIAGNOSIS — M17.0 PRIMARY OSTEOARTHRITIS OF KNEES, BILATERAL: Primary | ICD-10-CM

## 2021-08-18 PROCEDURE — 1160F PR REVIEW ALL MEDS BY PRESCRIBER/CLIN PHARMACIST DOCUMENTED: ICD-10-PCS | Mod: HCNC,CPTII,S$GLB, | Performed by: ORTHOPAEDIC SURGERY

## 2021-08-18 PROCEDURE — 3288F PR FALLS RISK ASSESSMENT DOCUMENTED: ICD-10-PCS | Mod: HCNC,CPTII,S$GLB, | Performed by: ORTHOPAEDIC SURGERY

## 2021-08-18 PROCEDURE — 20610 DRAIN/INJ JOINT/BURSA W/O US: CPT | Mod: 50,HCNC,S$GLB, | Performed by: ORTHOPAEDIC SURGERY

## 2021-08-18 PROCEDURE — 1101F PR PT FALLS ASSESS DOC 0-1 FALLS W/OUT INJ PAST YR: ICD-10-PCS | Mod: HCNC,CPTII,S$GLB, | Performed by: ORTHOPAEDIC SURGERY

## 2021-08-18 PROCEDURE — 20610 LARGE JOINT ASPIRATION/INJECTION: BILATERAL KNEE: ICD-10-PCS | Mod: 50,HCNC,S$GLB, | Performed by: ORTHOPAEDIC SURGERY

## 2021-08-18 PROCEDURE — 3008F BODY MASS INDEX DOCD: CPT | Mod: HCNC,CPTII,S$GLB, | Performed by: ORTHOPAEDIC SURGERY

## 2021-08-18 PROCEDURE — 99999 PR PBB SHADOW E&M-EST. PATIENT-LVL V: CPT | Mod: PBBFAC,HCNC,, | Performed by: ORTHOPAEDIC SURGERY

## 2021-08-18 PROCEDURE — 1159F MED LIST DOCD IN RCRD: CPT | Mod: HCNC,CPTII,S$GLB, | Performed by: ORTHOPAEDIC SURGERY

## 2021-08-18 PROCEDURE — 3044F PR MOST RECENT HEMOGLOBIN A1C LEVEL <7.0%: ICD-10-PCS | Mod: HCNC,CPTII,S$GLB, | Performed by: ORTHOPAEDIC SURGERY

## 2021-08-18 PROCEDURE — 3288F FALL RISK ASSESSMENT DOCD: CPT | Mod: HCNC,CPTII,S$GLB, | Performed by: ORTHOPAEDIC SURGERY

## 2021-08-18 PROCEDURE — 99213 PR OFFICE/OUTPT VISIT, EST, LEVL III, 20-29 MIN: ICD-10-PCS | Mod: HCNC,25,S$GLB, | Performed by: ORTHOPAEDIC SURGERY

## 2021-08-18 PROCEDURE — 3044F HG A1C LEVEL LT 7.0%: CPT | Mod: HCNC,CPTII,S$GLB, | Performed by: ORTHOPAEDIC SURGERY

## 2021-08-18 PROCEDURE — 1157F PR ADVANCE CARE PLAN OR EQUIV PRESENT IN MEDICAL RECORD: ICD-10-PCS | Mod: HCNC,CPTII,S$GLB, | Performed by: ORTHOPAEDIC SURGERY

## 2021-08-18 PROCEDURE — 99999 PR PBB SHADOW E&M-EST. PATIENT-LVL V: ICD-10-PCS | Mod: PBBFAC,HCNC,, | Performed by: ORTHOPAEDIC SURGERY

## 2021-08-18 PROCEDURE — 3008F PR BODY MASS INDEX (BMI) DOCUMENTED: ICD-10-PCS | Mod: HCNC,CPTII,S$GLB, | Performed by: ORTHOPAEDIC SURGERY

## 2021-08-18 PROCEDURE — 1125F AMNT PAIN NOTED PAIN PRSNT: CPT | Mod: HCNC,CPTII,S$GLB, | Performed by: ORTHOPAEDIC SURGERY

## 2021-08-18 PROCEDURE — 1160F RVW MEDS BY RX/DR IN RCRD: CPT | Mod: HCNC,CPTII,S$GLB, | Performed by: ORTHOPAEDIC SURGERY

## 2021-08-18 PROCEDURE — 1101F PT FALLS ASSESS-DOCD LE1/YR: CPT | Mod: HCNC,CPTII,S$GLB, | Performed by: ORTHOPAEDIC SURGERY

## 2021-08-18 PROCEDURE — 99213 OFFICE O/P EST LOW 20 MIN: CPT | Mod: HCNC,25,S$GLB, | Performed by: ORTHOPAEDIC SURGERY

## 2021-08-18 PROCEDURE — 1159F PR MEDICATION LIST DOCUMENTED IN MEDICAL RECORD: ICD-10-PCS | Mod: HCNC,CPTII,S$GLB, | Performed by: ORTHOPAEDIC SURGERY

## 2021-08-18 PROCEDURE — 1125F PR PAIN SEVERITY QUANTIFIED, PAIN PRESENT: ICD-10-PCS | Mod: HCNC,CPTII,S$GLB, | Performed by: ORTHOPAEDIC SURGERY

## 2021-08-18 PROCEDURE — 1157F ADVNC CARE PLAN IN RCRD: CPT | Mod: HCNC,CPTII,S$GLB, | Performed by: ORTHOPAEDIC SURGERY

## 2021-08-18 RX ADMIN — TRIAMCINOLONE ACETONIDE 40 MG: 40 INJECTION, SUSPENSION INTRA-ARTICULAR; INTRAMUSCULAR at 10:08

## 2021-08-19 RX ORDER — TRIAMCINOLONE ACETONIDE 40 MG/ML
40 INJECTION, SUSPENSION INTRA-ARTICULAR; INTRAMUSCULAR
Status: DISCONTINUED | OUTPATIENT
Start: 2021-08-18 | End: 2021-08-19 | Stop reason: HOSPADM

## 2021-08-25 ENCOUNTER — PATIENT MESSAGE (OUTPATIENT)
Dept: ORTHOPEDICS | Facility: CLINIC | Age: 71
End: 2021-08-25

## 2021-08-27 NOTE — PROGRESS NOTES
Pre-Visit Chart Review  For Appointment Scheduled on 01/05/2017      Health Maintenance Due   Topic Date Due    DEXA SCAN  11/15/1991    Zoster Vaccine  11/15/2011    Colonoscopy  02/01/2016                      EVERY 6 HOURS AS NEEDED FOR SHORTNESS OF BREATH OR WHEEZING  guaiFENesin (MUCINEX) 600 MG extended release tablet, Take 1 tablet by mouth 2 times daily  LANTUS SOLOSTAR 100 UNIT/ML injection pen, INJECT 15 UNITS SUBCUTANEOUSLY DAILY AT NIGHT  Calcium Acetate, Phos Binder, 667 MG CAPS, Take 2 capsules by mouth 3 times daily (with meals)   [DISCONTINUED] furosemide (LASIX) 40 MG tablet, Take 1 tablet by mouth daily  [DISCONTINUED] nitroGLYCERIN (NITROSTAT) 0.4 MG SL tablet, Place 1 tablet under the tongue every 5 minutes as needed for Chest pain  Easy Comfort Lancets MISC, USE TO TEST BLOOD SUGAR TWICE DAILY AS DIRECTED  blood glucose test strips (ONETOUCH ULTRA) strip, USE TO TEST BLOOD SUGAR TWICE DAILY AS DIRECTED  Insulin Pen Needle (EASY COMFORT PEN NEEDLES) 31G X 5 MM MISC, USE TO INJECT INSULIN ONCE DAILY  Alcohol Swabs (ALCOHOL PADS) 70 % PADS, USE TO CLEAN TESTING AND INJECTION SITES TWICE DAILY  Skin Protectants, Misc. (MINERIN CREME) CREA, APPLY TO AFFECTED AREA TOPICALLY AS NEEDED (Patient taking differently: every other day APPLY TO AFFECTED AREA TOPICALLY AS NEEDED)  Blood Glucose Monitoring Suppl (TRUE METRIX METER) w/Device KIT, USE TO TEST BLOOD GLUCOSE  Lancets (BD LANCET ULTRAFINE 30G) MISC, TEST TWICE DAILY  Lancet Devices (SIMPLE DIAGNOSTICS LANCING DEV) MISC, USE WITH LANCETS TO TEST BLOOD GLUCOSE  Blood Glucose Calibration (RIGOBERTO DOC CONTROL) Normal SOLN, USE TO PERIODICALLY CHECK GLUCOSE MONITOR ACCORDING TO THE MANUAL  UNIFINE PENTIPS 31G X 6 MM MISC, USE WITH insulin pens ONCE daily  PHARMACIST CHOICE LANCETS MISC, USE TO TEST BLOOD GLUCOSE ONCE A DAY  [DISCONTINUED] SENNA PLUS 8.6-50 MG per tablet, TAKE 1 TABLET BY MOUTH DAILY (Patient taking differently: Take 1 tablet by mouth daily as needed )  Misc.  Devices MISC, 1 each by Does not apply route daily Electric scooter  glucose monitoring kit (FREESTYLE) monitoring kit, 1 kit by Does not apply route daily  [DISCONTINUED] SENSIPAR 30 MG tablet, Take 1 tablet by mouth daily  OXYGEN, Inhale into the lungs O2  3L  PER NASAL CANNULA NIGHTLY  [DISCONTINUED] Misc. Devices (DIGITAL GLASS SCALE) MISC, Diagnosis: Diastolic Congestive heart failure    Current Medications:     Scheduled Meds:    triamcinolone   Topical BID    polyethylene glycol  17 g Oral Daily    lactobacillus  1 tablet Oral Daily    dilTIAZem  120 mg Oral Daily    hydrocortisone   Rectal BID    pantoprazole  40 mg IntraVENous Daily    And    sodium chloride (PF)  10 mL IntraVENous Daily    metoprolol  2.5 mg IntraVENous Once    midodrine  10 mg Oral TID WC    Calcium Acetate (Phos Binder)  2 capsule Oral TID WC    lidocaine 1 % injection  5 mL Intradermal Once    sodium chloride flush  5-40 mL IntraVENous 2 times per day    aspirin  81 mg Oral Daily    budesonide-formoterol  2 puff Inhalation BID    [Held by provider] furosemide  40 mg Oral Daily    guaiFENesin  600 mg Oral BID    insulin glargine  15 Units Subcutaneous Nightly    [Held by provider] lisinopril  5 mg Oral Daily    sennosides-docusate sodium  1 tablet Oral Daily    atorvastatin  40 mg Oral Nightly    insulin lispro  0-12 Units Subcutaneous TID WC    insulin lispro  0-6 Units Subcutaneous Nightly    [Held by provider] heparin (porcine)  5,000 Units Subcutaneous 3 times per day     Continuous Infusions:    sodium chloride      dextrose       PRN Meds:  albuterol sulfate HFA, albuterol, perflutren lipid microspheres, diphenhydrAMINE, sodium chloride flush, sodium chloride, fentanNYL, oxyCODONE-acetaminophen, glucose, dextrose, glucagon (rDNA), dextrose, ondansetron **OR** ondansetron, acetaminophen **OR** acetaminophen, magnesium hydroxide, nitroGLYCERIN    Input/Output:       I/O last 3 completed shifts:   In: 450 [P.O.:450]  Out: - .      Patient Vitals for the past 96 hrs (Last 3 readings):   Weight   08/27/21 0400 249 lb 1.9 oz (113 kg)   08/26/21 0400 247 lb (112 kg)   08/25/21 1347 247 lb 12.8 oz (112.4 kg)       Vital Signs:   Temperature:  Temp: 97.8 °F (36.6 °C)  TMax:   Temp (24hrs), Av.4 °F (36.3 °C), Min:97.2 °F (36.2 °C), Max:97.8 °F (36.6 °C)    Respirations:  Resp: 16  Pulse:   Pulse: 90  BP:    BP: 114/86  BP Range: Systolic (83RIM), EHU:50 , Min:78 , MJY:795       Diastolic (97PRJ), IWT:93, Min:56, Max:86      Physical Examination:     General:  Alert and oriented x3  HEENT: Traumatic. Eyes:   Pupils equal, round and reactive to light, EOMI. Neck:   Supple  Chest:   Bilateral vesicular breath sounds, no rales or wheezes. Cardiac:  S1 S2 RR, no murmurs, gallops or rubs. Abdomen: Soft and nontender lateral abdominal wall edema was present   :   No suprapubic or flank tenderness. Neuro:  AAO x 3, No FND. SKIN:  No rashes, good skin turgor. Extremities:  Chronic skin changes    Labs:       Recent Labs     21  0510 21  0835 21  0944 21  2124 21  0749   WBC 10.0  --   --   --   --    RBC 2.52*  --   --   --   --    HGB 8.3*   < > 8.1* 8.1* 8.2*   HCT 28.6*   < > 29.4* 28.7* 29.4*   .5*  --   --   --   --    MCH 32.9  --   --   --   --    MCHC 29.0  --   --   --   --    RDW 17.2*  --   --   --   --    *  --   --   --   --    MPV 10.1  --   --   --   --     < > = values in this interval not displayed. BMP:   Recent Labs     21  0835 21  0749    131*   K 5.0 4.8   CL 95* 94*   CO2 28 25   BUN 30* 25*   CREATININE 5.32* 5.06*   GLUCOSE 76 69*   CALCIUM 8.5* 8.3*      Radiology:     Reviewed. Assessment:     1. ESRD on Hemodialysis. His regular HD days are MWF at [facility] hemodialysis facility using LUE AVF under Dr. Gamaliel Means. His dry weight is 106.5 kg. Based on weight patient gained 2 pounds from last dialysis. 2.    Right red blood per rectum and for colonoscopy today  3. Hypertension blood pressure is under fair control 4. Fatigue. 5.  Hypotension. 6.    Obesity  7.   Anemia chronic in nature due to ESRD further complicated by GI losses  Plan:   1. Dialysis today and will remove 1.5 to 2 kg with dialysis  2. EGD and colonoscopy today  3. We will follow with you  Nutrition   Please ensure that patient is on a renal diet/TF. Avoid nephrotoxic drugs/contrast exposure. We will continue to follow along with you. Rosie Waldron MD  [facility]     This note is created with the assistance of a speech-recognition program. While intending to generate a document that actually reflects the content of the visit, no guarantees can be provided that every mistake has been identified and corrected by editing.

## 2021-09-02 ENCOUNTER — TELEPHONE (OUTPATIENT)
Dept: ORTHOPEDICS | Facility: CLINIC | Age: 71
End: 2021-09-02

## 2021-09-02 DIAGNOSIS — M17.0 PRIMARY OSTEOARTHRITIS OF KNEES, BILATERAL: Primary | ICD-10-CM

## 2021-09-02 DIAGNOSIS — E11.9 TYPE 2 DIABETES MELLITUS WITHOUT COMPLICATION, UNSPECIFIED WHETHER LONG TERM INSULIN USE: ICD-10-CM

## 2021-09-07 ENCOUNTER — PATIENT MESSAGE (OUTPATIENT)
Dept: FAMILY MEDICINE | Facility: CLINIC | Age: 71
End: 2021-09-07

## 2021-09-07 DIAGNOSIS — M51.36 LUMBAR DEGENERATIVE DISC DISEASE: ICD-10-CM

## 2021-09-07 RX ORDER — HYDROCODONE BITARTRATE AND ACETAMINOPHEN 7.5; 325 MG/1; MG/1
1 TABLET ORAL NIGHTLY PRN
Qty: 60 TABLET | Refills: 0 | Status: SHIPPED | OUTPATIENT
Start: 2021-09-07 | End: 2021-10-08 | Stop reason: SDUPTHER

## 2021-09-07 RX ORDER — CLONAZEPAM 0.5 MG/1
0.5 TABLET ORAL 2 TIMES DAILY PRN
Qty: 60 TABLET | Refills: 2 | Status: SHIPPED | OUTPATIENT
Start: 2021-09-07 | End: 2021-10-01 | Stop reason: SDUPTHER

## 2021-09-08 ENCOUNTER — PATIENT MESSAGE (OUTPATIENT)
Dept: FAMILY MEDICINE | Facility: CLINIC | Age: 71
End: 2021-09-08

## 2021-09-09 ENCOUNTER — PATIENT OUTREACH (OUTPATIENT)
Dept: ADMINISTRATIVE | Facility: OTHER | Age: 71
End: 2021-09-09

## 2021-09-09 DIAGNOSIS — Z12.11 ENCOUNTER FOR FIT (FECAL IMMUNOCHEMICAL TEST) SCREENING: Primary | ICD-10-CM

## 2021-09-10 ENCOUNTER — PATIENT MESSAGE (OUTPATIENT)
Dept: ORTHOPEDICS | Facility: CLINIC | Age: 71
End: 2021-09-10

## 2021-09-10 ENCOUNTER — TELEPHONE (OUTPATIENT)
Dept: ORTHOPEDICS | Facility: CLINIC | Age: 71
End: 2021-09-10

## 2021-09-13 ENCOUNTER — OFFICE VISIT (OUTPATIENT)
Dept: ORTHOPEDICS | Facility: CLINIC | Age: 71
End: 2021-09-13
Payer: MEDICARE

## 2021-09-13 VITALS — WEIGHT: 239 LBS | RESPIRATION RATE: 18 BRPM | BODY MASS INDEX: 36.22 KG/M2 | HEIGHT: 68 IN

## 2021-09-13 DIAGNOSIS — M17.0 PRIMARY OSTEOARTHRITIS OF KNEES, BILATERAL: Primary | ICD-10-CM

## 2021-09-13 PROCEDURE — 20610 LARGE JOINT ASPIRATION/INJECTION: BILATERAL KNEE: ICD-10-PCS | Mod: 50,HCNC,S$GLB, | Performed by: ORTHOPAEDIC SURGERY

## 2021-09-13 PROCEDURE — 1157F PR ADVANCE CARE PLAN OR EQUIV PRESENT IN MEDICAL RECORD: ICD-10-PCS | Mod: HCNC,CPTII,S$GLB, | Performed by: ORTHOPAEDIC SURGERY

## 2021-09-13 PROCEDURE — 3008F PR BODY MASS INDEX (BMI) DOCUMENTED: ICD-10-PCS | Mod: HCNC,CPTII,S$GLB, | Performed by: ORTHOPAEDIC SURGERY

## 2021-09-13 PROCEDURE — 3044F PR MOST RECENT HEMOGLOBIN A1C LEVEL <7.0%: ICD-10-PCS | Mod: HCNC,CPTII,S$GLB, | Performed by: ORTHOPAEDIC SURGERY

## 2021-09-13 PROCEDURE — 3044F HG A1C LEVEL LT 7.0%: CPT | Mod: HCNC,CPTII,S$GLB, | Performed by: ORTHOPAEDIC SURGERY

## 2021-09-13 PROCEDURE — 3066F PR DOCUMENTATION OF TREATMENT FOR NEPHROPATHY: ICD-10-PCS | Mod: HCNC,CPTII,S$GLB, | Performed by: ORTHOPAEDIC SURGERY

## 2021-09-13 PROCEDURE — 1101F PT FALLS ASSESS-DOCD LE1/YR: CPT | Mod: HCNC,CPTII,S$GLB, | Performed by: ORTHOPAEDIC SURGERY

## 2021-09-13 PROCEDURE — 3008F BODY MASS INDEX DOCD: CPT | Mod: HCNC,CPTII,S$GLB, | Performed by: ORTHOPAEDIC SURGERY

## 2021-09-13 PROCEDURE — 99499 NO LOS: ICD-10-PCS | Mod: HCNC,S$GLB,, | Performed by: ORTHOPAEDIC SURGERY

## 2021-09-13 PROCEDURE — 1101F PR PT FALLS ASSESS DOC 0-1 FALLS W/OUT INJ PAST YR: ICD-10-PCS | Mod: HCNC,CPTII,S$GLB, | Performed by: ORTHOPAEDIC SURGERY

## 2021-09-13 PROCEDURE — 99999 PR PBB SHADOW E&M-EST. PATIENT-LVL IV: CPT | Mod: PBBFAC,HCNC,, | Performed by: ORTHOPAEDIC SURGERY

## 2021-09-13 PROCEDURE — 3060F POS MICROALBUMINURIA REV: CPT | Mod: HCNC,CPTII,S$GLB, | Performed by: ORTHOPAEDIC SURGERY

## 2021-09-13 PROCEDURE — 3066F NEPHROPATHY DOC TX: CPT | Mod: HCNC,CPTII,S$GLB, | Performed by: ORTHOPAEDIC SURGERY

## 2021-09-13 PROCEDURE — 1160F RVW MEDS BY RX/DR IN RCRD: CPT | Mod: HCNC,CPTII,S$GLB, | Performed by: ORTHOPAEDIC SURGERY

## 2021-09-13 PROCEDURE — 20610 DRAIN/INJ JOINT/BURSA W/O US: CPT | Mod: 50,HCNC,S$GLB, | Performed by: ORTHOPAEDIC SURGERY

## 2021-09-13 PROCEDURE — 1159F MED LIST DOCD IN RCRD: CPT | Mod: HCNC,CPTII,S$GLB, | Performed by: ORTHOPAEDIC SURGERY

## 2021-09-13 PROCEDURE — 3288F PR FALLS RISK ASSESSMENT DOCUMENTED: ICD-10-PCS | Mod: HCNC,CPTII,S$GLB, | Performed by: ORTHOPAEDIC SURGERY

## 2021-09-13 PROCEDURE — 1125F AMNT PAIN NOTED PAIN PRSNT: CPT | Mod: HCNC,CPTII,S$GLB, | Performed by: ORTHOPAEDIC SURGERY

## 2021-09-13 PROCEDURE — 99499 UNLISTED E&M SERVICE: CPT | Mod: HCNC,S$GLB,, | Performed by: ORTHOPAEDIC SURGERY

## 2021-09-13 PROCEDURE — 99999 PR PBB SHADOW E&M-EST. PATIENT-LVL IV: ICD-10-PCS | Mod: PBBFAC,HCNC,, | Performed by: ORTHOPAEDIC SURGERY

## 2021-09-13 PROCEDURE — 4010F PR ACE/ARB THEARPY RXD/TAKEN: ICD-10-PCS | Mod: HCNC,CPTII,S$GLB, | Performed by: ORTHOPAEDIC SURGERY

## 2021-09-13 PROCEDURE — 1125F PR PAIN SEVERITY QUANTIFIED, PAIN PRESENT: ICD-10-PCS | Mod: HCNC,CPTII,S$GLB, | Performed by: ORTHOPAEDIC SURGERY

## 2021-09-13 PROCEDURE — 3288F FALL RISK ASSESSMENT DOCD: CPT | Mod: HCNC,CPTII,S$GLB, | Performed by: ORTHOPAEDIC SURGERY

## 2021-09-13 PROCEDURE — 1160F PR REVIEW ALL MEDS BY PRESCRIBER/CLIN PHARMACIST DOCUMENTED: ICD-10-PCS | Mod: HCNC,CPTII,S$GLB, | Performed by: ORTHOPAEDIC SURGERY

## 2021-09-13 PROCEDURE — 3060F PR POS MICROALBUMINURIA RESULT DOCUMENTED/REVIEW: ICD-10-PCS | Mod: HCNC,CPTII,S$GLB, | Performed by: ORTHOPAEDIC SURGERY

## 2021-09-13 PROCEDURE — 1159F PR MEDICATION LIST DOCUMENTED IN MEDICAL RECORD: ICD-10-PCS | Mod: HCNC,CPTII,S$GLB, | Performed by: ORTHOPAEDIC SURGERY

## 2021-09-13 PROCEDURE — 1157F ADVNC CARE PLAN IN RCRD: CPT | Mod: HCNC,CPTII,S$GLB, | Performed by: ORTHOPAEDIC SURGERY

## 2021-09-13 PROCEDURE — 4010F ACE/ARB THERAPY RXD/TAKEN: CPT | Mod: HCNC,CPTII,S$GLB, | Performed by: ORTHOPAEDIC SURGERY

## 2021-09-19 ENCOUNTER — OFFICE VISIT (OUTPATIENT)
Dept: URGENT CARE | Facility: CLINIC | Age: 71
End: 2021-09-19
Payer: MEDICARE

## 2021-09-19 VITALS
SYSTOLIC BLOOD PRESSURE: 138 MMHG | OXYGEN SATURATION: 97 % | HEIGHT: 68 IN | DIASTOLIC BLOOD PRESSURE: 65 MMHG | HEART RATE: 70 BPM | RESPIRATION RATE: 20 BRPM | WEIGHT: 231.19 LBS | TEMPERATURE: 98 F | BODY MASS INDEX: 35.04 KG/M2

## 2021-09-19 DIAGNOSIS — U07.1 COVID-19: Primary | ICD-10-CM

## 2021-09-19 DIAGNOSIS — B34.9 VIRAL SYNDROME: ICD-10-CM

## 2021-09-19 DIAGNOSIS — R05.9 COUGH: ICD-10-CM

## 2021-09-19 DIAGNOSIS — U07.1 COVID-19 VIRUS DETECTED: ICD-10-CM

## 2021-09-19 LAB
CTP QC/QA: YES
SARS-COV-2 RDRP RESP QL NAA+PROBE: POSITIVE

## 2021-09-19 PROCEDURE — 3044F HG A1C LEVEL LT 7.0%: CPT | Mod: CPTII,S$GLB,, | Performed by: NURSE PRACTITIONER

## 2021-09-19 PROCEDURE — U0002: ICD-10-PCS | Mod: QW,S$GLB,, | Performed by: NURSE PRACTITIONER

## 2021-09-19 PROCEDURE — 3066F PR DOCUMENTATION OF TREATMENT FOR NEPHROPATHY: ICD-10-PCS | Mod: CPTII,S$GLB,, | Performed by: NURSE PRACTITIONER

## 2021-09-19 PROCEDURE — U0002 COVID-19 LAB TEST NON-CDC: HCPCS | Mod: QW,S$GLB,, | Performed by: NURSE PRACTITIONER

## 2021-09-19 PROCEDURE — 99204 PR OFFICE/OUTPT VISIT, NEW, LEVL IV, 45-59 MIN: ICD-10-PCS | Mod: S$GLB,,, | Performed by: NURSE PRACTITIONER

## 2021-09-19 PROCEDURE — 3008F BODY MASS INDEX DOCD: CPT | Mod: CPTII,S$GLB,, | Performed by: NURSE PRACTITIONER

## 2021-09-19 PROCEDURE — 3078F PR MOST RECENT DIASTOLIC BLOOD PRESSURE < 80 MM HG: ICD-10-PCS | Mod: CPTII,S$GLB,, | Performed by: NURSE PRACTITIONER

## 2021-09-19 PROCEDURE — 1157F PR ADVANCE CARE PLAN OR EQUIV PRESENT IN MEDICAL RECORD: ICD-10-PCS | Mod: CPTII,S$GLB,, | Performed by: NURSE PRACTITIONER

## 2021-09-19 PROCEDURE — 3008F PR BODY MASS INDEX (BMI) DOCUMENTED: ICD-10-PCS | Mod: CPTII,S$GLB,, | Performed by: NURSE PRACTITIONER

## 2021-09-19 PROCEDURE — 1160F RVW MEDS BY RX/DR IN RCRD: CPT | Mod: CPTII,S$GLB,, | Performed by: NURSE PRACTITIONER

## 2021-09-19 PROCEDURE — 3060F PR POS MICROALBUMINURIA RESULT DOCUMENTED/REVIEW: ICD-10-PCS | Mod: CPTII,S$GLB,, | Performed by: NURSE PRACTITIONER

## 2021-09-19 PROCEDURE — 3075F SYST BP GE 130 - 139MM HG: CPT | Mod: CPTII,S$GLB,, | Performed by: NURSE PRACTITIONER

## 2021-09-19 PROCEDURE — 3066F NEPHROPATHY DOC TX: CPT | Mod: CPTII,S$GLB,, | Performed by: NURSE PRACTITIONER

## 2021-09-19 PROCEDURE — 4010F ACE/ARB THERAPY RXD/TAKEN: CPT | Mod: CPTII,S$GLB,, | Performed by: NURSE PRACTITIONER

## 2021-09-19 PROCEDURE — 1157F ADVNC CARE PLAN IN RCRD: CPT | Mod: CPTII,S$GLB,, | Performed by: NURSE PRACTITIONER

## 2021-09-19 PROCEDURE — 3078F DIAST BP <80 MM HG: CPT | Mod: CPTII,S$GLB,, | Performed by: NURSE PRACTITIONER

## 2021-09-19 PROCEDURE — 1159F PR MEDICATION LIST DOCUMENTED IN MEDICAL RECORD: ICD-10-PCS | Mod: CPTII,S$GLB,, | Performed by: NURSE PRACTITIONER

## 2021-09-19 PROCEDURE — 1159F MED LIST DOCD IN RCRD: CPT | Mod: CPTII,S$GLB,, | Performed by: NURSE PRACTITIONER

## 2021-09-19 PROCEDURE — 3060F POS MICROALBUMINURIA REV: CPT | Mod: CPTII,S$GLB,, | Performed by: NURSE PRACTITIONER

## 2021-09-19 PROCEDURE — 3075F PR MOST RECENT SYSTOLIC BLOOD PRESS GE 130-139MM HG: ICD-10-PCS | Mod: CPTII,S$GLB,, | Performed by: NURSE PRACTITIONER

## 2021-09-19 PROCEDURE — 3044F PR MOST RECENT HEMOGLOBIN A1C LEVEL <7.0%: ICD-10-PCS | Mod: CPTII,S$GLB,, | Performed by: NURSE PRACTITIONER

## 2021-09-19 PROCEDURE — 99204 OFFICE O/P NEW MOD 45 MIN: CPT | Mod: S$GLB,,, | Performed by: NURSE PRACTITIONER

## 2021-09-19 PROCEDURE — 1160F PR REVIEW ALL MEDS BY PRESCRIBER/CLIN PHARMACIST DOCUMENTED: ICD-10-PCS | Mod: CPTII,S$GLB,, | Performed by: NURSE PRACTITIONER

## 2021-09-19 PROCEDURE — 4010F PR ACE/ARB THEARPY RXD/TAKEN: ICD-10-PCS | Mod: CPTII,S$GLB,, | Performed by: NURSE PRACTITIONER

## 2021-09-19 RX ORDER — BENZONATATE 100 MG/1
100 CAPSULE ORAL 3 TIMES DAILY PRN
Qty: 30 CAPSULE | Refills: 0 | Status: SHIPPED | OUTPATIENT
Start: 2021-09-19 | End: 2021-09-29

## 2021-09-21 ENCOUNTER — INFUSION (OUTPATIENT)
Dept: INFECTIOUS DISEASES | Facility: HOSPITAL | Age: 71
End: 2021-09-21
Attending: NURSE PRACTITIONER
Payer: MEDICARE

## 2021-09-21 VITALS
OXYGEN SATURATION: 94 % | RESPIRATION RATE: 19 BRPM | SYSTOLIC BLOOD PRESSURE: 156 MMHG | DIASTOLIC BLOOD PRESSURE: 68 MMHG | TEMPERATURE: 98 F | HEART RATE: 52 BPM

## 2021-09-21 DIAGNOSIS — U07.1 COVID-19: Primary | ICD-10-CM

## 2021-09-21 PROCEDURE — 63600175 PHARM REV CODE 636 W HCPCS: Mod: HCNC | Performed by: NURSE PRACTITIONER

## 2021-09-21 PROCEDURE — 25000003 PHARM REV CODE 250: Mod: HCNC | Performed by: NURSE PRACTITIONER

## 2021-09-21 PROCEDURE — M0243 CASIRIVI AND IMDEVI INFUSION: HCPCS | Performed by: NURSE PRACTITIONER

## 2021-09-21 RX ORDER — SODIUM CHLORIDE 0.9 % (FLUSH) 0.9 %
10 SYRINGE (ML) INJECTION
Status: DISCONTINUED | OUTPATIENT
Start: 2021-09-21 | End: 2021-10-01

## 2021-09-21 RX ORDER — EPINEPHRINE 0.3 MG/.3ML
0.3 INJECTION SUBCUTANEOUS
Status: DISCONTINUED | OUTPATIENT
Start: 2021-09-21 | End: 2021-10-01

## 2021-09-21 RX ORDER — ACETAMINOPHEN 325 MG/1
650 TABLET ORAL ONCE AS NEEDED
Status: DISCONTINUED | OUTPATIENT
Start: 2021-09-21 | End: 2021-10-01

## 2021-09-21 RX ORDER — ALBUTEROL SULFATE 90 UG/1
2 AEROSOL, METERED RESPIRATORY (INHALATION)
Status: DISCONTINUED | OUTPATIENT
Start: 2021-09-21 | End: 2021-10-01

## 2021-09-21 RX ORDER — DIPHENHYDRAMINE HYDROCHLORIDE 50 MG/ML
25 INJECTION INTRAMUSCULAR; INTRAVENOUS ONCE AS NEEDED
Status: DISCONTINUED | OUTPATIENT
Start: 2021-09-21 | End: 2021-10-01

## 2021-09-21 RX ORDER — ONDANSETRON 4 MG/1
4 TABLET, ORALLY DISINTEGRATING ORAL ONCE AS NEEDED
Status: DISCONTINUED | OUTPATIENT
Start: 2021-09-21 | End: 2021-10-01

## 2021-09-21 RX ADMIN — CASIRIVIMAB AND IMDEVIMAB 600 MG: 600; 600 INJECTION, SOLUTION, CONCENTRATE INTRAVENOUS at 10:09

## 2021-10-01 ENCOUNTER — OFFICE VISIT (OUTPATIENT)
Dept: FAMILY MEDICINE | Facility: CLINIC | Age: 71
End: 2021-10-01
Payer: MEDICARE

## 2021-10-01 VITALS
SYSTOLIC BLOOD PRESSURE: 138 MMHG | DIASTOLIC BLOOD PRESSURE: 72 MMHG | RESPIRATION RATE: 16 BRPM | OXYGEN SATURATION: 94 % | WEIGHT: 229.25 LBS | HEART RATE: 76 BPM | HEIGHT: 68 IN | TEMPERATURE: 98 F | BODY MASS INDEX: 34.75 KG/M2

## 2021-10-01 DIAGNOSIS — Z12.31 OTHER SCREENING MAMMOGRAM: ICD-10-CM

## 2021-10-01 DIAGNOSIS — Z23 NEED FOR PNEUMOCOCCAL VACCINATION: ICD-10-CM

## 2021-10-01 DIAGNOSIS — Z78.0 OSTEOPENIA AFTER MENOPAUSE: ICD-10-CM

## 2021-10-01 DIAGNOSIS — E11.40 TYPE 2 DIABETES MELLITUS WITH DIABETIC NEUROPATHY, WITHOUT LONG-TERM CURRENT USE OF INSULIN: Chronic | ICD-10-CM

## 2021-10-01 DIAGNOSIS — J44.0 CHRONIC OBSTRUCTIVE PULMONARY DISEASE WITH LOWER RESPIRATORY INFECTION: Primary | ICD-10-CM

## 2021-10-01 DIAGNOSIS — M85.80 OSTEOPENIA AFTER MENOPAUSE: ICD-10-CM

## 2021-10-01 DIAGNOSIS — M51.36 LUMBAR DEGENERATIVE DISC DISEASE: ICD-10-CM

## 2021-10-01 DIAGNOSIS — E11.59 HYPERTENSION ASSOCIATED WITH DIABETES: ICD-10-CM

## 2021-10-01 DIAGNOSIS — E11.610 DIABETIC NEUROGENIC ARTHROPATHY: ICD-10-CM

## 2021-10-01 DIAGNOSIS — I15.2 HYPERTENSION ASSOCIATED WITH DIABETES: ICD-10-CM

## 2021-10-01 DIAGNOSIS — Z12.11 COLON CANCER SCREENING: ICD-10-CM

## 2021-10-01 PROCEDURE — 3288F PR FALLS RISK ASSESSMENT DOCUMENTED: ICD-10-PCS | Mod: HCNC,CPTII,S$GLB, | Performed by: FAMILY MEDICINE

## 2021-10-01 PROCEDURE — 1159F PR MEDICATION LIST DOCUMENTED IN MEDICAL RECORD: ICD-10-PCS | Mod: HCNC,CPTII,S$GLB, | Performed by: FAMILY MEDICINE

## 2021-10-01 PROCEDURE — 3066F PR DOCUMENTATION OF TREATMENT FOR NEPHROPATHY: ICD-10-PCS | Mod: HCNC,CPTII,S$GLB, | Performed by: FAMILY MEDICINE

## 2021-10-01 PROCEDURE — 3008F PR BODY MASS INDEX (BMI) DOCUMENTED: ICD-10-PCS | Mod: HCNC,CPTII,S$GLB, | Performed by: FAMILY MEDICINE

## 2021-10-01 PROCEDURE — 3060F POS MICROALBUMINURIA REV: CPT | Mod: HCNC,CPTII,S$GLB, | Performed by: FAMILY MEDICINE

## 2021-10-01 PROCEDURE — 3075F SYST BP GE 130 - 139MM HG: CPT | Mod: HCNC,CPTII,S$GLB, | Performed by: FAMILY MEDICINE

## 2021-10-01 PROCEDURE — 99499 RISK ADDL DX/OHS AUDIT: ICD-10-PCS | Mod: S$PBB,,, | Performed by: FAMILY MEDICINE

## 2021-10-01 PROCEDURE — 1157F PR ADVANCE CARE PLAN OR EQUIV PRESENT IN MEDICAL RECORD: ICD-10-PCS | Mod: HCNC,CPTII,S$GLB, | Performed by: FAMILY MEDICINE

## 2021-10-01 PROCEDURE — 99214 PR OFFICE/OUTPT VISIT, EST, LEVL IV, 30-39 MIN: ICD-10-PCS | Mod: HCNC,S$GLB,, | Performed by: FAMILY MEDICINE

## 2021-10-01 PROCEDURE — 3078F DIAST BP <80 MM HG: CPT | Mod: HCNC,CPTII,S$GLB, | Performed by: FAMILY MEDICINE

## 2021-10-01 PROCEDURE — 3288F FALL RISK ASSESSMENT DOCD: CPT | Mod: HCNC,CPTII,S$GLB, | Performed by: FAMILY MEDICINE

## 2021-10-01 PROCEDURE — 99499 UNLISTED E&M SERVICE: CPT | Mod: S$PBB,,, | Performed by: FAMILY MEDICINE

## 2021-10-01 PROCEDURE — 3066F NEPHROPATHY DOC TX: CPT | Mod: HCNC,CPTII,S$GLB, | Performed by: FAMILY MEDICINE

## 2021-10-01 PROCEDURE — 1159F MED LIST DOCD IN RCRD: CPT | Mod: HCNC,CPTII,S$GLB, | Performed by: FAMILY MEDICINE

## 2021-10-01 PROCEDURE — 99999 PR PBB SHADOW E&M-EST. PATIENT-LVL V: CPT | Mod: PBBFAC,HCNC,, | Performed by: FAMILY MEDICINE

## 2021-10-01 PROCEDURE — 1101F PR PT FALLS ASSESS DOC 0-1 FALLS W/OUT INJ PAST YR: ICD-10-PCS | Mod: HCNC,CPTII,S$GLB, | Performed by: FAMILY MEDICINE

## 2021-10-01 PROCEDURE — 1125F PR PAIN SEVERITY QUANTIFIED, PAIN PRESENT: ICD-10-PCS | Mod: HCNC,CPTII,S$GLB, | Performed by: FAMILY MEDICINE

## 2021-10-01 PROCEDURE — 3044F PR MOST RECENT HEMOGLOBIN A1C LEVEL <7.0%: ICD-10-PCS | Mod: HCNC,CPTII,S$GLB, | Performed by: FAMILY MEDICINE

## 2021-10-01 PROCEDURE — 99999 PR PBB SHADOW E&M-EST. PATIENT-LVL V: ICD-10-PCS | Mod: PBBFAC,HCNC,, | Performed by: FAMILY MEDICINE

## 2021-10-01 PROCEDURE — 3008F BODY MASS INDEX DOCD: CPT | Mod: HCNC,CPTII,S$GLB, | Performed by: FAMILY MEDICINE

## 2021-10-01 PROCEDURE — 1157F ADVNC CARE PLAN IN RCRD: CPT | Mod: HCNC,CPTII,S$GLB, | Performed by: FAMILY MEDICINE

## 2021-10-01 PROCEDURE — 1160F RVW MEDS BY RX/DR IN RCRD: CPT | Mod: HCNC,CPTII,S$GLB, | Performed by: FAMILY MEDICINE

## 2021-10-01 PROCEDURE — 3075F PR MOST RECENT SYSTOLIC BLOOD PRESS GE 130-139MM HG: ICD-10-PCS | Mod: HCNC,CPTII,S$GLB, | Performed by: FAMILY MEDICINE

## 2021-10-01 PROCEDURE — 4010F PR ACE/ARB THEARPY RXD/TAKEN: ICD-10-PCS | Mod: HCNC,CPTII,S$GLB, | Performed by: FAMILY MEDICINE

## 2021-10-01 PROCEDURE — 4010F ACE/ARB THERAPY RXD/TAKEN: CPT | Mod: HCNC,CPTII,S$GLB, | Performed by: FAMILY MEDICINE

## 2021-10-01 PROCEDURE — 99214 OFFICE O/P EST MOD 30 MIN: CPT | Mod: HCNC,S$GLB,, | Performed by: FAMILY MEDICINE

## 2021-10-01 PROCEDURE — 1125F AMNT PAIN NOTED PAIN PRSNT: CPT | Mod: HCNC,CPTII,S$GLB, | Performed by: FAMILY MEDICINE

## 2021-10-01 PROCEDURE — 1160F PR REVIEW ALL MEDS BY PRESCRIBER/CLIN PHARMACIST DOCUMENTED: ICD-10-PCS | Mod: HCNC,CPTII,S$GLB, | Performed by: FAMILY MEDICINE

## 2021-10-01 PROCEDURE — 3044F HG A1C LEVEL LT 7.0%: CPT | Mod: HCNC,CPTII,S$GLB, | Performed by: FAMILY MEDICINE

## 2021-10-01 PROCEDURE — 3078F PR MOST RECENT DIASTOLIC BLOOD PRESSURE < 80 MM HG: ICD-10-PCS | Mod: HCNC,CPTII,S$GLB, | Performed by: FAMILY MEDICINE

## 2021-10-01 PROCEDURE — 3060F PR POS MICROALBUMINURIA RESULT DOCUMENTED/REVIEW: ICD-10-PCS | Mod: HCNC,CPTII,S$GLB, | Performed by: FAMILY MEDICINE

## 2021-10-01 PROCEDURE — 1101F PT FALLS ASSESS-DOCD LE1/YR: CPT | Mod: HCNC,CPTII,S$GLB, | Performed by: FAMILY MEDICINE

## 2021-10-01 RX ORDER — HYDROCODONE BITARTRATE AND ACETAMINOPHEN 7.5; 325 MG/1; MG/1
1 TABLET ORAL EVERY 12 HOURS PRN
Qty: 60 TABLET | Refills: 0 | Status: SHIPPED | OUTPATIENT
Start: 2021-11-06 | End: 2021-10-05 | Stop reason: SDUPTHER

## 2021-10-01 RX ORDER — ATORVASTATIN CALCIUM 10 MG/1
10 TABLET, FILM COATED ORAL EVERY OTHER DAY
Qty: 35 TABLET | Refills: 3 | Status: SHIPPED | OUTPATIENT
Start: 2021-10-01 | End: 2022-07-26 | Stop reason: SDUPTHER

## 2021-10-01 RX ORDER — GABAPENTIN 600 MG/1
600 TABLET ORAL 3 TIMES DAILY
Qty: 270 TABLET | Refills: 3 | Status: SHIPPED | OUTPATIENT
Start: 2021-10-01 | End: 2022-07-26 | Stop reason: ALTCHOICE

## 2021-10-01 RX ORDER — CLONAZEPAM 0.5 MG/1
0.5 TABLET ORAL 2 TIMES DAILY PRN
Qty: 60 TABLET | Refills: 2 | Status: SHIPPED | OUTPATIENT
Start: 2021-10-01 | End: 2021-12-09 | Stop reason: SDUPTHER

## 2021-10-05 ENCOUNTER — PATIENT MESSAGE (OUTPATIENT)
Dept: FAMILY MEDICINE | Facility: CLINIC | Age: 71
End: 2021-10-05

## 2021-10-05 DIAGNOSIS — M51.36 LUMBAR DEGENERATIVE DISC DISEASE: ICD-10-CM

## 2021-10-06 ENCOUNTER — PATIENT MESSAGE (OUTPATIENT)
Dept: FAMILY MEDICINE | Facility: CLINIC | Age: 71
End: 2021-10-06

## 2021-10-06 RX ORDER — HYDROCODONE BITARTRATE AND ACETAMINOPHEN 7.5; 325 MG/1; MG/1
1 TABLET ORAL EVERY 12 HOURS PRN
Qty: 60 TABLET | Refills: 0 | Status: SHIPPED | OUTPATIENT
Start: 2021-11-06 | End: 2021-11-24 | Stop reason: SDUPTHER

## 2021-10-07 ENCOUNTER — PATIENT MESSAGE (OUTPATIENT)
Dept: ADMINISTRATIVE | Facility: HOSPITAL | Age: 71
End: 2021-10-07

## 2021-10-08 ENCOUNTER — PATIENT MESSAGE (OUTPATIENT)
Dept: FAMILY MEDICINE | Facility: CLINIC | Age: 71
End: 2021-10-08

## 2021-10-08 DIAGNOSIS — M51.36 LUMBAR DEGENERATIVE DISC DISEASE: ICD-10-CM

## 2021-10-11 RX ORDER — HYDROCODONE BITARTRATE AND ACETAMINOPHEN 7.5; 325 MG/1; MG/1
1 TABLET ORAL NIGHTLY PRN
Qty: 60 TABLET | Refills: 0 | Status: SHIPPED | OUTPATIENT
Start: 2021-10-11 | End: 2021-11-06 | Stop reason: SDUPTHER

## 2021-10-13 LAB — NONINV COLON CA DNA+OCC BLD SCRN STL QL: NEGATIVE

## 2021-10-21 ENCOUNTER — PATIENT MESSAGE (OUTPATIENT)
Dept: PLASTIC SURGERY | Facility: CLINIC | Age: 71
End: 2021-10-21

## 2021-10-22 ENCOUNTER — TELEPHONE (OUTPATIENT)
Dept: FAMILY MEDICINE | Facility: CLINIC | Age: 71
End: 2021-10-22

## 2021-11-10 RX ORDER — BUDESONIDE 0.5 MG/2ML
0.5 INHALANT ORAL
Qty: 180 ML | Refills: 3 | Status: SHIPPED | OUTPATIENT
Start: 2021-11-10 | End: 2022-04-05 | Stop reason: ALTCHOICE

## 2021-12-28 ENCOUNTER — PATIENT MESSAGE (OUTPATIENT)
Dept: FAMILY MEDICINE | Facility: CLINIC | Age: 71
End: 2021-12-28

## 2022-01-12 DIAGNOSIS — I15.2 HYPERTENSION ASSOCIATED WITH DIABETES: ICD-10-CM

## 2022-01-12 DIAGNOSIS — E11.59 HYPERTENSION ASSOCIATED WITH DIABETES: ICD-10-CM

## 2022-01-12 NOTE — TELEPHONE ENCOUNTER
No new care gaps identified.  Powered by [employer]. Reference number: [id number].   1/12/2022 4:00:05 PM CST

## 2022-01-21 NOTE — TELEPHONE ENCOUNTER
Quick DC. Inappropriate Request    Refill Authorization Note   Nelly Tian  is requesting a refill authorization.  Brief Assessment and Rationale for Refill:  Quick Discontinue  Medication Therapy Plan:  Therapy change on 10/1/2021 from 20mg every day to 10mg every other day; pharmacy requesting old order refill of 20mg daily.    Medication Reconciliation Completed:  No      Comments:   Pended Medication(s)       Requested Prescriptions     Pending Prescriptions Disp Refills    atorvastatin (LIPITOR) 10 MG tablet [Pharmacy Med Name: ATORVASTATIN CALCIUM 20 MG Tablet] 45 tablet 1     Sig: Take 1 tablet (10 mg total) by mouth every other day.        Duplicate Pended Encounter(s)/ Last Prescribed Details: (includes pharmacy & prescriber details)   Ordering User:  Constantine Bird MD            Pharmacy    [id number] - [place], [place] - [address] AT [facility] [address]   [address], [place] [address]   Phone:  [phone]  Fax:  [phone]   MARITZA #:  [id number]   RASHEL Reason: --       Outpatient Medication Detail     Disp Refills Start End RASHEL   atorvastatin (LIPITOR) 10 MG tablet 35 tablet 3 10/1/2021  No   Sig - Route: Take 1 tablet (10 mg total) by mouth every other day. - Oral   Sent to pharmacy as: atorvastatin (LIPITOR) 10 MG tablet   Class: Normal   Order: [id number]   Date/Time Signed: 10/1/2021 16:07       E-Prescribing Status: Receipt confirmed by pharmacy (10/1/2021  4:07 PM CDT)     Ordering Encounter Report    Associated Reports   View Encounter            Note composed:12:02 PM 01/21/2022

## 2022-01-23 RX ORDER — ATORVASTATIN CALCIUM 20 MG/1
20 TABLET, FILM COATED ORAL DAILY
Qty: 90 TABLET | Refills: 3 | OUTPATIENT
Start: 2022-01-23

## 2022-01-26 ENCOUNTER — PATIENT MESSAGE (OUTPATIENT)
Dept: FAMILY MEDICINE | Facility: CLINIC | Age: 72
End: 2022-01-26
Payer: MEDICARE

## 2022-01-26 DIAGNOSIS — M51.36 LUMBAR DEGENERATIVE DISC DISEASE: ICD-10-CM

## 2022-01-26 NOTE — TELEPHONE ENCOUNTER
No new care gaps identified.  Powered by [employer] by [employer]. Reference number: [id number].   1/26/2022 2:59:39 PM CST

## 2022-01-26 NOTE — TELEPHONE ENCOUNTER
Please see pt message. Pt is requesting to fill pain medication. Script says contraindicated with klonopin.   last ov 10/1/21 with Dr. Bird. Please advise

## 2022-01-27 RX ORDER — HYDROCODONE BITARTRATE AND ACETAMINOPHEN 7.5; 325 MG/1; MG/1
1 TABLET ORAL EVERY 12 HOURS PRN
Qty: 60 TABLET | Refills: 0 | Status: SHIPPED | OUTPATIENT
Start: 2022-01-27 | End: 2022-02-24 | Stop reason: SDUPTHER

## 2022-01-30 DIAGNOSIS — R09.81 CONGESTION OF NASAL SINUS: ICD-10-CM

## 2022-01-30 NOTE — TELEPHONE ENCOUNTER
No new care gaps identified.  Powered by [employer]. Reference number: [id number].   1/30/2022 3:22:29 PM CST

## 2022-02-02 DIAGNOSIS — E11.9 TYPE 2 DIABETES MELLITUS WITHOUT COMPLICATION: ICD-10-CM

## 2022-02-11 RX ORDER — FLUTICASONE PROPIONATE 50 MCG
SPRAY, SUSPENSION (ML) NASAL
Qty: 48 G | Refills: 2 | Status: SHIPPED | OUTPATIENT
Start: 2022-02-11 | End: 2023-03-04 | Stop reason: SDUPTHER

## 2022-02-11 NOTE — TELEPHONE ENCOUNTER
Refill Authorization Note   Nelly Tian  is requesting a refill authorization.  Brief Assessment and Rationale for Refill:  Approve     Medication Therapy Plan:       Medication Reconciliation Completed: No   Comments:   --->Care Gap information included below if applicable.       Requested Prescriptions   Pending Prescriptions Disp Refills    fluticasone propionate (FLONASE) 50 mcg/actuation nasal spray [Pharmacy Med Name: FLUTICASONE 50MCG NASAL SP (120) RX] 48 g 2     Sig: SHAKE LIQUID AND USE 1 SPRAY(50 MCG) IN EACH NOSTRIL EVERY DAY       Ear, Nose, and Throat: Nasal Preparations - Corticosteroids Passed - 1/30/2022  3:21 PM        Passed - Patient is at least 18 years old        Passed - Valid encounter within last 15 months     Recent Visits  Date Type Provider Dept   10/01/21 Office Visit Constantine Bird MD [facility]   04/15/21 Office Visit Constantine Bird MD [facility]   09/10/20 Office Visit Constantine Bird MD [facility]   03/10/20 Office Visit Constantine Bird MD [facility]   Showing recent visits within past 720 days and meeting all other requirements  Future Appointments  No visits were found meeting these conditions.  Showing future appointments within next 150 days and meeting all other requirements      Future Appointments              In 1 month Constantine Bird MD [place] [facility], [place]    In 2 months David Bautista MD [place] [facility] Ophthalmology, [place]                    Appointments  past 12m or future 3m with PCP    Date Provider   Last Visit   10/1/2021 Constantine Bird MD   Next Visit   4/5/2022 Constantine Bird MD   ED visits in past 90 days: 0     Note composed:2:02 PM 02/11/2022

## 2022-02-21 DIAGNOSIS — R11.11 NON-INTRACTABLE VOMITING WITHOUT NAUSEA, UNSPECIFIED VOMITING TYPE: ICD-10-CM

## 2022-02-21 DIAGNOSIS — E11.43 GASTROPARESIS DIABETICORUM: ICD-10-CM

## 2022-02-21 DIAGNOSIS — K31.84 GASTROPARESIS DIABETICORUM: ICD-10-CM

## 2022-02-21 NOTE — TELEPHONE ENCOUNTER
No new care gaps identified.  Powered by [employer]. Reference number: [id number].   2/21/2022 2:30:50 PM CST

## 2022-02-22 RX ORDER — ONDANSETRON 8 MG/1
TABLET, ORALLY DISINTEGRATING ORAL
Qty: 40 TABLET | Refills: 3 | OUTPATIENT
Start: 2022-02-22

## 2022-02-22 NOTE — TELEPHONE ENCOUNTER
Pt does not need refills. Spoke to pharmacy advised they do have refills for pt and they will contact pt to follow up. Pt informed and states she received an automated message but will call to speak pharmacy

## 2022-02-24 ENCOUNTER — PATIENT MESSAGE (OUTPATIENT)
Dept: FAMILY MEDICINE | Facility: CLINIC | Age: 72
End: 2022-02-24
Payer: MEDICARE

## 2022-02-24 DIAGNOSIS — M51.36 LUMBAR DEGENERATIVE DISC DISEASE: ICD-10-CM

## 2022-02-24 NOTE — TELEPHONE ENCOUNTER
Care Due:                  Date            Visit Type   Department     Provider  --------------------------------------------------------------------------------                                EP -                              PRIMARY      [facility]  Last Visit: 10-      CARE ([facility])   ONOFRE Bird                              EP -                              PRIMARY      [facility]  Next Visit: 04-      CARE ([facility])   ONOFRE Bird                                                            Last  Test          Frequency    Reason                     Performed    Due Date  --------------------------------------------------------------------------------    HBA1C.......  6 months...  metFORMIN, semaglutide...  07- 01-    Powered by [employer]. Reference number: [id number].   2/24/2022 7:55:29 AM CST

## 2022-02-28 ENCOUNTER — PATIENT MESSAGE (OUTPATIENT)
Dept: FAMILY MEDICINE | Facility: CLINIC | Age: 72
End: 2022-02-28
Payer: MEDICARE

## 2022-03-01 RX ORDER — HYDROCODONE BITARTRATE AND ACETAMINOPHEN 7.5; 325 MG/1; MG/1
1 TABLET ORAL EVERY 12 HOURS PRN
Qty: 60 TABLET | Refills: 0 | Status: SHIPPED | OUTPATIENT
Start: 2022-03-01 | End: 2022-03-30 | Stop reason: SDUPTHER

## 2022-03-09 ENCOUNTER — LAB VISIT (OUTPATIENT)
Dept: LAB | Facility: HOSPITAL | Age: 72
End: 2022-03-09
Attending: FAMILY MEDICINE
Payer: MEDICARE

## 2022-03-09 DIAGNOSIS — E11.9 TYPE 2 DIABETES MELLITUS WITHOUT COMPLICATION: ICD-10-CM

## 2022-03-09 LAB
ESTIMATED AVG GLUCOSE: 100 MG/DL (ref 68–131)
HBA1C MFR BLD: 5.1 % (ref 4–5.6)

## 2022-03-09 PROCEDURE — 36415 COLL VENOUS BLD VENIPUNCTURE: CPT | Mod: PO | Performed by: FAMILY MEDICINE

## 2022-03-09 PROCEDURE — 83036 HEMOGLOBIN GLYCOSYLATED A1C: CPT | Performed by: FAMILY MEDICINE

## 2022-03-16 ENCOUNTER — PATIENT MESSAGE (OUTPATIENT)
Dept: FAMILY MEDICINE | Facility: CLINIC | Age: 72
End: 2022-03-16
Payer: MEDICARE

## 2022-03-16 ENCOUNTER — PATIENT MESSAGE (OUTPATIENT)
Dept: FAMILY MEDICINE | Facility: CLINIC | Age: 72
End: 2022-03-16

## 2022-03-16 DIAGNOSIS — E11.9 TYPE 2 DIABETES MELLITUS WITHOUT COMPLICATION: ICD-10-CM

## 2022-03-18 ENCOUNTER — TELEPHONE (OUTPATIENT)
Dept: FAMILY MEDICINE | Facility: CLINIC | Age: 72
End: 2022-03-18
Payer: MEDICARE

## 2022-03-18 NOTE — TELEPHONE ENCOUNTER
Attempted to contact pt no answer no voicemail received busy signal. Sent portal message response to pt

## 2022-03-18 NOTE — TELEPHONE ENCOUNTER
----- Message from Daniela Dickey sent at 3/18/2022 11:37 AM CDT -----  Contact: patient  Type: Needs Medical Advice  Who Called:  patient  Symptoms (please be specific):  feels yucky, nausea, congested,   How long has patient had these symptoms:  3 days  Pharmacy name and phone #:    [id number] - [place], [place] - [address] AT [place] [address]  [address] [address]  Phone: [phone] Fax: [phone]  Best Call Back Number: [phone] (home)     Additional Information: patient tried to do virtual appt and couldn't get it to work

## 2022-03-21 ENCOUNTER — PATIENT MESSAGE (OUTPATIENT)
Dept: ADMINISTRATIVE | Facility: HOSPITAL | Age: 72
End: 2022-03-21
Payer: MEDICARE

## 2022-03-25 ENCOUNTER — LAB VISIT (OUTPATIENT)
Dept: LAB | Facility: HOSPITAL | Age: 72
End: 2022-03-25
Attending: FAMILY MEDICINE
Payer: MEDICARE

## 2022-03-25 DIAGNOSIS — E11.9 TYPE 2 DIABETES MELLITUS WITHOUT COMPLICATION: ICD-10-CM

## 2022-03-25 LAB
CHOLEST SERPL-MCNC: 89 MG/DL (ref 120–199)
CHOLEST/HDLC SERPL: 2.4 {RATIO} (ref 2–5)
HDLC SERPL-MCNC: 37 MG/DL (ref 40–75)
HDLC SERPL: 41.6 % (ref 20–50)
LDLC SERPL CALC-MCNC: 41.6 MG/DL (ref 63–159)
NONHDLC SERPL-MCNC: 52 MG/DL
TRIGL SERPL-MCNC: 52 MG/DL (ref 30–150)

## 2022-03-25 PROCEDURE — 36415 COLL VENOUS BLD VENIPUNCTURE: CPT | Mod: PO | Performed by: FAMILY MEDICINE

## 2022-03-25 PROCEDURE — 80061 LIPID PANEL: CPT | Performed by: FAMILY MEDICINE

## 2022-03-26 PROCEDURE — 99285 EMERGENCY DEPT VISIT HI MDM: CPT | Mod: 25

## 2022-03-27 ENCOUNTER — HOSPITAL ENCOUNTER (OUTPATIENT)
Facility: HOSPITAL | Age: 72
Discharge: HOME | End: 2022-03-28
Attending: EMERGENCY MEDICINE | Admitting: INTERNAL MEDICINE
Payer: MEDICARE

## 2022-03-27 DIAGNOSIS — J44.9 CHRONIC OBSTRUCTIVE PULMONARY DISEASE, UNSPECIFIED COPD TYPE: ICD-10-CM

## 2022-03-27 DIAGNOSIS — J42 CHRONIC BRONCHITIS, UNSPECIFIED CHRONIC BRONCHITIS TYPE: ICD-10-CM

## 2022-03-27 DIAGNOSIS — R07.9 CHEST PAIN: ICD-10-CM

## 2022-03-27 DIAGNOSIS — I50.9 CONGESTIVE HEART FAILURE, UNSPECIFIED HF CHRONICITY, UNSPECIFIED HEART FAILURE TYPE: Primary | ICD-10-CM

## 2022-03-27 PROBLEM — J96.21 ACUTE ON CHRONIC RESPIRATORY FAILURE WITH HYPOXIA: Status: ACTIVE | Noted: 2017-04-23

## 2022-03-27 LAB
ALBUMIN SERPL BCP-MCNC: 3.3 G/DL (ref 3.5–5.2)
ALBUMIN SERPL BCP-MCNC: 3.8 G/DL (ref 3.5–5.2)
ALP SERPL-CCNC: 103 U/L (ref 55–135)
ALP SERPL-CCNC: 120 U/L (ref 55–135)
ALT SERPL W/O P-5'-P-CCNC: 23 U/L (ref 10–44)
ALT SERPL W/O P-5'-P-CCNC: 24 U/L (ref 10–44)
ANION GAP SERPL CALC-SCNC: 13 MMOL/L (ref 8–16)
ANION GAP SERPL CALC-SCNC: 14 MMOL/L (ref 8–16)
AST SERPL-CCNC: 29 U/L (ref 10–40)
AST SERPL-CCNC: 33 U/L (ref 10–40)
BASOPHILS # BLD AUTO: 0.03 K/UL (ref 0–0.2)
BASOPHILS # BLD AUTO: 0.07 K/UL (ref 0–0.2)
BASOPHILS NFR BLD: 0.4 % (ref 0–1.9)
BASOPHILS NFR BLD: 0.9 % (ref 0–1.9)
BILIRUB SERPL-MCNC: 0.7 MG/DL (ref 0.1–1)
BILIRUB SERPL-MCNC: 0.9 MG/DL (ref 0.1–1)
BNP SERPL-MCNC: 550 PG/ML (ref 0–99)
BUN SERPL-MCNC: 12 MG/DL (ref 8–23)
BUN SERPL-MCNC: 12 MG/DL (ref 8–23)
CALCIUM SERPL-MCNC: 10.1 MG/DL (ref 8.7–10.5)
CALCIUM SERPL-MCNC: 9.6 MG/DL (ref 8.7–10.5)
CHLORIDE SERPL-SCNC: 100 MMOL/L (ref 95–110)
CHLORIDE SERPL-SCNC: 97 MMOL/L (ref 95–110)
CO2 SERPL-SCNC: 26 MMOL/L (ref 23–29)
CO2 SERPL-SCNC: 30 MMOL/L (ref 23–29)
CREAT SERPL-MCNC: 0.6 MG/DL (ref 0.5–1.4)
CREAT SERPL-MCNC: 0.7 MG/DL (ref 0.5–1.4)
DIFFERENTIAL METHOD: ABNORMAL
DIFFERENTIAL METHOD: ABNORMAL
EOSINOPHIL # BLD AUTO: 0 K/UL (ref 0–0.5)
EOSINOPHIL # BLD AUTO: 0.2 K/UL (ref 0–0.5)
EOSINOPHIL NFR BLD: 0.5 % (ref 0–8)
EOSINOPHIL NFR BLD: 3.1 % (ref 0–8)
ERYTHROCYTE [DISTWIDTH] IN BLOOD BY AUTOMATED COUNT: 13.9 % (ref 11.5–14.5)
ERYTHROCYTE [DISTWIDTH] IN BLOOD BY AUTOMATED COUNT: 13.9 % (ref 11.5–14.5)
EST. GFR  (AFRICAN AMERICAN): >60 ML/MIN/1.73 M^2
EST. GFR  (AFRICAN AMERICAN): >60 ML/MIN/1.73 M^2
EST. GFR  (NON AFRICAN AMERICAN): >60 ML/MIN/1.73 M^2
EST. GFR  (NON AFRICAN AMERICAN): >60 ML/MIN/1.73 M^2
GLUCOSE SERPL-MCNC: 220 MG/DL (ref 70–110)
GLUCOSE SERPL-MCNC: 92 MG/DL (ref 70–110)
HCT VFR BLD AUTO: 39.3 % (ref 37–48.5)
HCT VFR BLD AUTO: 41.4 % (ref 37–48.5)
HGB BLD-MCNC: 12.2 G/DL (ref 12–16)
HGB BLD-MCNC: 12.7 G/DL (ref 12–16)
IMM GRANULOCYTES # BLD AUTO: 0.01 K/UL (ref 0–0.04)
IMM GRANULOCYTES # BLD AUTO: 0.02 K/UL (ref 0–0.04)
IMM GRANULOCYTES NFR BLD AUTO: 0.1 % (ref 0–0.5)
IMM GRANULOCYTES NFR BLD AUTO: 0.3 % (ref 0–0.5)
INFLUENZA A, MOLECULAR: NEGATIVE
INFLUENZA B, MOLECULAR: NEGATIVE
LYMPHOCYTES # BLD AUTO: 0.6 K/UL (ref 1–4.8)
LYMPHOCYTES # BLD AUTO: 1.6 K/UL (ref 1–4.8)
LYMPHOCYTES NFR BLD: 20 % (ref 18–48)
LYMPHOCYTES NFR BLD: 8.3 % (ref 18–48)
MAGNESIUM SERPL-MCNC: 1.8 MG/DL (ref 1.6–2.6)
MCH RBC QN AUTO: 30.3 PG (ref 27–31)
MCH RBC QN AUTO: 30.5 PG (ref 27–31)
MCHC RBC AUTO-ENTMCNC: 30.7 G/DL (ref 32–36)
MCHC RBC AUTO-ENTMCNC: 31 G/DL (ref 32–36)
MCV RBC AUTO: 98 FL (ref 82–98)
MCV RBC AUTO: 99 FL (ref 82–98)
MONOCYTES # BLD AUTO: 0.1 K/UL (ref 0.3–1)
MONOCYTES # BLD AUTO: 1.2 K/UL (ref 0.3–1)
MONOCYTES NFR BLD: 1.3 % (ref 4–15)
MONOCYTES NFR BLD: 15.3 % (ref 4–15)
NEUTROPHILS # BLD AUTO: 4.7 K/UL (ref 1.8–7.7)
NEUTROPHILS # BLD AUTO: 6.8 K/UL (ref 1.8–7.7)
NEUTROPHILS NFR BLD: 60.4 % (ref 38–73)
NEUTROPHILS NFR BLD: 89.4 % (ref 38–73)
NRBC BLD-RTO: 0 /100 WBC
NRBC BLD-RTO: 0 /100 WBC
PHOSPHATE SERPL-MCNC: 3.7 MG/DL (ref 2.7–4.5)
PLATELET # BLD AUTO: 159 K/UL (ref 150–450)
PLATELET # BLD AUTO: 172 K/UL (ref 150–450)
PMV BLD AUTO: 10.3 FL (ref 9.2–12.9)
PMV BLD AUTO: 10.9 FL (ref 9.2–12.9)
POCT GLUCOSE: 141 MG/DL (ref 70–110)
POCT GLUCOSE: 143 MG/DL (ref 70–110)
POCT GLUCOSE: 184 MG/DL (ref 70–110)
POCT GLUCOSE: 259 MG/DL (ref 70–110)
POTASSIUM SERPL-SCNC: 3.6 MMOL/L (ref 3.5–5.1)
POTASSIUM SERPL-SCNC: 4 MMOL/L (ref 3.5–5.1)
PROCALCITONIN SERPL IA-MCNC: 0.05 NG/ML
PROT SERPL-MCNC: 7.1 G/DL (ref 6–8.4)
PROT SERPL-MCNC: 8 G/DL (ref 6–8.4)
RBC # BLD AUTO: 4 M/UL (ref 4–5.4)
RBC # BLD AUTO: 4.19 M/UL (ref 4–5.4)
SARS-COV-2 RDRP RESP QL NAA+PROBE: NEGATIVE
SODIUM SERPL-SCNC: 139 MMOL/L (ref 136–145)
SODIUM SERPL-SCNC: 141 MMOL/L (ref 136–145)
SPECIMEN SOURCE: NORMAL
TROPONIN I SERPL DL<=0.01 NG/ML-MCNC: 0.01 NG/ML (ref 0–0.03)
TROPONIN I SERPL DL<=0.01 NG/ML-MCNC: 0.02 NG/ML (ref 0–0.03)
WBC # BLD AUTO: 7.58 K/UL (ref 3.9–12.7)
WBC # BLD AUTO: 7.79 K/UL (ref 3.9–12.7)

## 2022-03-27 PROCEDURE — 63600175 PHARM REV CODE 636 W HCPCS

## 2022-03-27 PROCEDURE — 94640 AIRWAY INHALATION TREATMENT: CPT

## 2022-03-27 PROCEDURE — 27000221 HC OXYGEN, UP TO 24 HOURS

## 2022-03-27 PROCEDURE — 36415 COLL VENOUS BLD VENIPUNCTURE: CPT | Performed by: EMERGENCY MEDICINE

## 2022-03-27 PROCEDURE — 25000242 PHARM REV CODE 250 ALT 637 W/ HCPCS: Performed by: EMERGENCY MEDICINE

## 2022-03-27 PROCEDURE — 25000003 PHARM REV CODE 250

## 2022-03-27 PROCEDURE — 83880 ASSAY OF NATRIURETIC PEPTIDE: CPT | Performed by: EMERGENCY MEDICINE

## 2022-03-27 PROCEDURE — 94640 AIRWAY INHALATION TREATMENT: CPT | Performed by: INTERNAL MEDICINE

## 2022-03-27 PROCEDURE — 80053 COMPREHEN METABOLIC PANEL: CPT | Performed by: EMERGENCY MEDICINE

## 2022-03-27 PROCEDURE — G0378 HOSPITAL OBSERVATION PER HR: HCPCS

## 2022-03-27 PROCEDURE — 96365 THER/PROPH/DIAG IV INF INIT: CPT

## 2022-03-27 PROCEDURE — 84484 ASSAY OF TROPONIN QUANT: CPT | Performed by: EMERGENCY MEDICINE

## 2022-03-27 PROCEDURE — 85025 COMPLETE CBC W/AUTO DIFF WBC: CPT | Mod: 91

## 2022-03-27 PROCEDURE — 25000242 PHARM REV CODE 250 ALT 637 W/ HCPCS

## 2022-03-27 PROCEDURE — 36415 COLL VENOUS BLD VENIPUNCTURE: CPT

## 2022-03-27 PROCEDURE — 84100 ASSAY OF PHOSPHORUS: CPT

## 2022-03-27 PROCEDURE — 85025 COMPLETE CBC W/AUTO DIFF WBC: CPT | Performed by: EMERGENCY MEDICINE

## 2022-03-27 PROCEDURE — 80053 COMPREHEN METABOLIC PANEL: CPT | Mod: 91

## 2022-03-27 PROCEDURE — 84145 PROCALCITONIN (PCT): CPT

## 2022-03-27 PROCEDURE — 87502 INFLUENZA DNA AMP PROBE: CPT | Performed by: EMERGENCY MEDICINE

## 2022-03-27 PROCEDURE — 94761 N-INVAS EAR/PLS OXIMETRY MLT: CPT

## 2022-03-27 PROCEDURE — 63600175 PHARM REV CODE 636 W HCPCS: Performed by: NURSE PRACTITIONER

## 2022-03-27 PROCEDURE — 63600175 PHARM REV CODE 636 W HCPCS: Performed by: EMERGENCY MEDICINE

## 2022-03-27 PROCEDURE — 83735 ASSAY OF MAGNESIUM: CPT

## 2022-03-27 PROCEDURE — 96375 TX/PRO/DX INJ NEW DRUG ADDON: CPT

## 2022-03-27 PROCEDURE — U0002 COVID-19 LAB TEST NON-CDC: HCPCS | Performed by: EMERGENCY MEDICINE

## 2022-03-27 PROCEDURE — 93005 ELECTROCARDIOGRAM TRACING: CPT

## 2022-03-27 RX ORDER — INSULIN ASPART 100 [IU]/ML
0-5 INJECTION, SOLUTION INTRAVENOUS; SUBCUTANEOUS
Status: DISCONTINUED | OUTPATIENT
Start: 2022-03-27 | End: 2022-03-28 | Stop reason: HOSPADM

## 2022-03-27 RX ORDER — FUROSEMIDE 10 MG/ML
40 INJECTION INTRAMUSCULAR; INTRAVENOUS
Status: COMPLETED | OUTPATIENT
Start: 2022-03-27 | End: 2022-03-27

## 2022-03-27 RX ORDER — GLUCAGON 1 MG
1 KIT INJECTION
Status: DISCONTINUED | OUTPATIENT
Start: 2022-03-27 | End: 2022-03-28 | Stop reason: HOSPADM

## 2022-03-27 RX ORDER — TALC
9 POWDER (GRAM) TOPICAL NIGHTLY PRN
Status: DISCONTINUED | OUTPATIENT
Start: 2022-03-27 | End: 2022-03-28 | Stop reason: HOSPADM

## 2022-03-27 RX ORDER — HYDROCODONE BITARTRATE AND ACETAMINOPHEN 7.5; 325 MG/1; MG/1
1 TABLET ORAL EVERY 12 HOURS PRN
Status: DISCONTINUED | OUTPATIENT
Start: 2022-03-27 | End: 2022-03-28 | Stop reason: HOSPADM

## 2022-03-27 RX ORDER — ATORVASTATIN CALCIUM 10 MG/1
10 TABLET, FILM COATED ORAL EVERY OTHER DAY
Status: DISCONTINUED | OUTPATIENT
Start: 2022-03-27 | End: 2022-03-28 | Stop reason: HOSPADM

## 2022-03-27 RX ORDER — ACETAMINOPHEN 325 MG/1
650 TABLET ORAL EVERY 6 HOURS PRN
Status: DISCONTINUED | OUTPATIENT
Start: 2022-03-27 | End: 2022-03-28 | Stop reason: HOSPADM

## 2022-03-27 RX ORDER — CLONAZEPAM 0.5 MG/1
0.5 TABLET ORAL 2 TIMES DAILY PRN
Status: DISCONTINUED | OUTPATIENT
Start: 2022-03-27 | End: 2022-03-28 | Stop reason: HOSPADM

## 2022-03-27 RX ORDER — LISINOPRIL 10 MG/1
20 TABLET ORAL 2 TIMES DAILY
Status: DISCONTINUED | OUTPATIENT
Start: 2022-03-27 | End: 2022-03-28 | Stop reason: HOSPADM

## 2022-03-27 RX ORDER — FUROSEMIDE 40 MG/1
40 TABLET ORAL DAILY
Status: DISCONTINUED | OUTPATIENT
Start: 2022-03-27 | End: 2022-03-28 | Stop reason: HOSPADM

## 2022-03-27 RX ORDER — PANTOPRAZOLE SODIUM 40 MG/1
40 TABLET, DELAYED RELEASE ORAL DAILY
Refills: 3 | Status: DISCONTINUED | OUTPATIENT
Start: 2022-03-27 | End: 2022-03-28 | Stop reason: HOSPADM

## 2022-03-27 RX ORDER — IBUPROFEN 200 MG
24 TABLET ORAL
Status: DISCONTINUED | OUTPATIENT
Start: 2022-03-27 | End: 2022-03-28 | Stop reason: HOSPADM

## 2022-03-27 RX ORDER — IPRATROPIUM BROMIDE AND ALBUTEROL SULFATE 2.5; .5 MG/3ML; MG/3ML
3 SOLUTION RESPIRATORY (INHALATION)
Status: DISCONTINUED | OUTPATIENT
Start: 2022-03-27 | End: 2022-03-28 | Stop reason: HOSPADM

## 2022-03-27 RX ORDER — ONDANSETRON 2 MG/ML
4 INJECTION INTRAMUSCULAR; INTRAVENOUS EVERY 6 HOURS PRN
Status: DISCONTINUED | OUTPATIENT
Start: 2022-03-27 | End: 2022-03-28 | Stop reason: HOSPADM

## 2022-03-27 RX ORDER — POLYETHYLENE GLYCOL 3350 17 G/17G
17 POWDER, FOR SOLUTION ORAL DAILY
Status: DISCONTINUED | OUTPATIENT
Start: 2022-03-27 | End: 2022-03-28 | Stop reason: HOSPADM

## 2022-03-27 RX ORDER — GABAPENTIN 300 MG/1
600 CAPSULE ORAL 3 TIMES DAILY
Refills: 3 | Status: DISCONTINUED | OUTPATIENT
Start: 2022-03-27 | End: 2022-03-28 | Stop reason: HOSPADM

## 2022-03-27 RX ORDER — PREDNISONE 20 MG/1
40 TABLET ORAL DAILY
Status: DISCONTINUED | OUTPATIENT
Start: 2022-03-27 | End: 2022-03-28 | Stop reason: HOSPADM

## 2022-03-27 RX ORDER — POTASSIUM CHLORIDE 20 MEQ/1
20 TABLET, EXTENDED RELEASE ORAL DAILY
Status: DISCONTINUED | OUTPATIENT
Start: 2022-03-27 | End: 2022-03-28 | Stop reason: HOSPADM

## 2022-03-27 RX ORDER — IBUPROFEN 200 MG
16 TABLET ORAL
Status: DISCONTINUED | OUTPATIENT
Start: 2022-03-27 | End: 2022-03-28 | Stop reason: HOSPADM

## 2022-03-27 RX ORDER — SODIUM CHLORIDE 0.9 % (FLUSH) 0.9 %
3 SYRINGE (ML) INJECTION
Status: DISCONTINUED | OUTPATIENT
Start: 2022-03-27 | End: 2022-03-28 | Stop reason: HOSPADM

## 2022-03-27 RX ORDER — FLUTICASONE FUROATE AND VILANTEROL 100; 25 UG/1; UG/1
1 POWDER RESPIRATORY (INHALATION) DAILY
Status: DISCONTINUED | OUTPATIENT
Start: 2022-03-27 | End: 2022-03-28 | Stop reason: HOSPADM

## 2022-03-27 RX ORDER — METHYLPREDNISOLONE SOD SUCC 125 MG
125 VIAL (EA) INJECTION
Status: COMPLETED | OUTPATIENT
Start: 2022-03-27 | End: 2022-03-27

## 2022-03-27 RX ORDER — IPRATROPIUM BROMIDE AND ALBUTEROL SULFATE 2.5; .5 MG/3ML; MG/3ML
3 SOLUTION RESPIRATORY (INHALATION)
Status: COMPLETED | OUTPATIENT
Start: 2022-03-27 | End: 2022-03-27

## 2022-03-27 RX ADMIN — GABAPENTIN 600 MG: 300 CAPSULE ORAL at 09:03

## 2022-03-27 RX ADMIN — THERA TABS 1 TABLET: TAB at 09:03

## 2022-03-27 RX ADMIN — APIXABAN 5 MG: 2.5 TABLET, FILM COATED ORAL at 09:03

## 2022-03-27 RX ADMIN — POTASSIUM CHLORIDE 20 MEQ: 1500 TABLET, EXTENDED RELEASE ORAL at 09:03

## 2022-03-27 RX ADMIN — IPRATROPIUM BROMIDE AND ALBUTEROL SULFATE 3 ML: 2.5; .5 SOLUTION RESPIRATORY (INHALATION) at 07:03

## 2022-03-27 RX ADMIN — CEFTRIAXONE 1 G: 1 INJECTION, SOLUTION INTRAVENOUS at 11:03

## 2022-03-27 RX ADMIN — IPRATROPIUM BROMIDE AND ALBUTEROL SULFATE 3 ML: 2.5; .5 SOLUTION RESPIRATORY (INHALATION) at 12:03

## 2022-03-27 RX ADMIN — ACETAMINOPHEN 650 MG: 325 TABLET ORAL at 02:03

## 2022-03-27 RX ADMIN — FUROSEMIDE 40 MG: 40 TABLET ORAL at 09:03

## 2022-03-27 RX ADMIN — LISINOPRIL 20 MG: 10 TABLET ORAL at 09:03

## 2022-03-27 RX ADMIN — POLYETHYLENE GLYCOL 3350 17 G: 17 POWDER, FOR SOLUTION ORAL at 09:03

## 2022-03-27 RX ADMIN — CLONAZEPAM 0.5 MG: 0.5 TABLET ORAL at 05:03

## 2022-03-27 RX ADMIN — FUROSEMIDE 40 MG: 10 INJECTION, SOLUTION INTRAMUSCULAR; INTRAVENOUS at 02:03

## 2022-03-27 RX ADMIN — CLONAZEPAM 0.5 MG: 0.5 TABLET ORAL at 09:03

## 2022-03-27 RX ADMIN — ATORVASTATIN CALCIUM 10 MG: 10 TABLET, FILM COATED ORAL at 09:03

## 2022-03-27 RX ADMIN — FLUTICASONE FUROATE AND VILANTEROL TRIFENATATE 1 PUFF: 100; 25 POWDER RESPIRATORY (INHALATION) at 07:03

## 2022-03-27 RX ADMIN — PREDNISONE 40 MG: 20 TABLET ORAL at 09:03

## 2022-03-27 RX ADMIN — GABAPENTIN 600 MG: 300 CAPSULE ORAL at 01:03

## 2022-03-27 RX ADMIN — PANTOPRAZOLE SODIUM 40 MG: 40 TABLET, DELAYED RELEASE ORAL at 09:03

## 2022-03-27 RX ADMIN — METHYLPREDNISOLONE SODIUM SUCCINATE 125 MG: 125 INJECTION, POWDER, FOR SOLUTION INTRAMUSCULAR; INTRAVENOUS at 12:03

## 2022-03-27 NOTE — HPI
Nelly Tian is a 71 y.o. female with PMHx of COPD on 2 L NC O2, CHF, HTN, HLD, T2DM, and a-fib on Eliquis who presented to the ED via EMS with SOB onset x 3 wks and CP onset x 2130 3/26.  EMS noted room air saturations 93% and increased respiratory rate.  At triage O2 sats were 87%. She has been taking her breathing treatments at home with minimal improvement. She states she has had URI symptoms with increased cough and shortness of breath over the past 3-4 days. She describes the left sided CP as a sharp sensation that does not radiate. Alleviating factors include moving. Denies exacerbating factors. Pain was 9/10 at its worst and is currently 3/10. She also reports subjective fever ( t-max 100.0F), chills, fatigue, nasal congestion, productive cough (green sputum), RLE edema, and chronic, unchanged back pain. She denies diaphoresis, n/v/d, palpitations, unexpected weight gain, and HA. Patient is a current smoker and smokes approximately 1 ppd. ED workup was significant for hypoxia, elevated BNP, and tachypnea. The patient was placed in observation under the care of Hospital Medicine.

## 2022-03-27 NOTE — ASSESSMENT & PLAN NOTE
Chronic, controlled.  Latest blood pressure and vitals reviewed-   Temp:  [97.9 °F (36.6 °C)]   Pulse:  [70-89]   Resp:  [22-24]   BP: (152-191)/()   SpO2:  [87 %-100 %] .   Home meds for hypertension were reviewed and noted below.   Hypertension Medications             furosemide (LASIX) 40 MG tablet TAKE 1 TABLET(40 MG) BY MOUTH EVERY DAY    lisinopriL (PRINIVIL,ZESTRIL) 20 MG tablet TAKE 1 TABLET TWICE DAILY          While in the hospital, will manage blood pressure as follows; Continue home antihypertensive regimen    Will utilize p.r.n. blood pressure medication only if patient's blood pressure greater than  180/110 and she develops symptoms such as worsening chest pain or shortness of breath.

## 2022-03-27 NOTE — ASSESSMENT & PLAN NOTE
Patient with atrial fibrillation which is controlled currently with Beta Blocker. Patient is currently in sinus rhythm.[id number] Score: 3. Anticoagulation indicated. Anticoagulation done with Eliquis.

## 2022-03-27 NOTE — ED NOTES
Been having cold like symptoms since about a month now, symptoms worsens tonight with chest discomfort and increased SOB, pt still smokes cigarettes about a pack a day.

## 2022-03-27 NOTE — ASSESSMENT & PLAN NOTE
Patient with Hypoxic Respiratory failure which is Acute on chronic.  she is on home oxygen at 2 LPM. Supplemental oxygen was provided and noted-  .   Signs/symptoms of respiratory failure include- tachypnea and increased work of breathing. Contributing diagnoses includes - CHF and COPD Labs and images were reviewed. Patient Has not had a recent ABG. Will treat underlying causes and adjust management of respiratory failure as follows- supplemental oxygen, bronchodilators, steroids, procal

## 2022-03-27 NOTE — ASSESSMENT & PLAN NOTE
Patient is chronically on statin.will continue for now. Monitor clinically. Last LDL was   Lab Results   Component Value Date    LDLCALC 41.6 (L) 03/25/2022

## 2022-03-27 NOTE — ASSESSMENT & PLAN NOTE
Patient's FSGs are controlled on current medication regimen.  Last A1c reviewed-   Lab Results   Component Value Date    HGBA1C 5.1 03/09/2022     Most recent fingerstick glucose reviewed- No results for input(s): POCTGLUCOSE in the last 24 hours.  Current correctional scale  Low  Maintain anti-hyperglycemic dose as follows-   Antihyperglycemics (From admission, onward)            Start     Stop Route Frequency Ordered    03/27/22 0407  insulin aspart U-100 pen 0-5 Units         -- SubQ Before meals & nightly PRN 03/27/22 0307        Hold Oral hypoglycemics while patient is in the hospital.

## 2022-03-27 NOTE — PLAN OF CARE
Pt compliant with POC. Bed alarm activated. Pt educated on fall risk and accepting of teaching. No safety issues this shift. BG monitored but insulin not required. Antibiotics administered. Labs monitored. Vitals monitored and are stable. Pt being diuresed. No complaints reported.

## 2022-03-27 NOTE — PLAN OF CARE
03/27/22 0716   Patient Assessment/Suction   Respiratory Effort Unlabored   Expansion/Accessory Muscles/Retractions expansion symmetric;no retractions   All Lung Fields Breath Sounds Anterior:;Lateral:;coarse;diminished   Rhythm/Pattern, Respiratory unlabored   Cough Frequency frequent   Cough Type fair;congested   PRE-TX-O2   O2 Device (Oxygen Therapy) nasal cannula   $ Is the patient on Low Flow Oxygen? Yes   Flow (L/min) 2   SpO2 95 %   Pulse Oximetry Type Intermittent   $ Pulse Oximetry - Multiple Charge Pulse Oximetry - Multiple   Pulse 80   Resp 18   Aerosol Therapy   $ Aerosol Therapy Charges Aerosol Treatment   Respiratory Treatment Status (SVN) given   Treatment Route (SVN) mask   Patient Position (SVN) sitting on edge of bed   Post Treatment Assessment (SVN) breath sounds unchanged   Signs of Intolerance (SVN) none   Breath Sounds Post-Respiratory Treatment   Post-treatment Heart Rate (beats/min) 80   Post-treatment Resp Rate (breaths/min) 18

## 2022-03-27 NOTE — LETTER
Maye Tirado accompanied her mother to the hospital on 3/28/2022 to pick her up for discharge. They may return to work on 3/28/2022.      If you have any questions or concerns, please don't hesitate to call.      Jigna Nunes RN  [phone]

## 2022-03-27 NOTE — ASSESSMENT & PLAN NOTE
Patient's COPD is with exacerbation noted by continued dyspnea and use of accessory muscles for breathing currently.  Patient is currently on COPD Pathway. Continue scheduled inhalers Steroids and Supplemental oxygen and monitor respiratory status closely.

## 2022-03-27 NOTE — ED PROVIDER NOTES
Encounter Date: 3/26/2022       History     Chief Complaint   Patient presents with    Shortness of Breath     1 duoneb treatment with EMS for increased RR and RA 93% also c/o chest discomfort     HPI patient is a 71-year-old woman with a history of COPD and continued smoking, CHF, angina pectoralis, atrial flutter who presents emergency department complaining of chest pain that began this evening.  She states she has had URI symptoms with increased cough and shortness of breath over the past 3-4 days.  She has been taking her breathing treatments at home without improvement.  She does not use home oxygen.  EMS noted room air saturations 93% and increased respiratory rate.  At triage O2 sats were 87%.  She was given 1 DuoNeb in treatment.  Denies any fever.  Review of patient's allergies indicates:   Allergen Reactions    Dilaudid [hydromorphone] Anaphylaxis     Other reaction(s): Anaphylaxis  Other reaction(s): Unknown    Zyvox [linezolid in dextrose 5%] Shortness Of Breath     Past Medical History:   Diagnosis Date    *Atrial flutter     Angina pectoris 9/18/2017    Anxiety     Arthritis     Asthma     Atrial fibrillation     Back pain     Cataract     OD    CHF (congestive heart failure)     COPD (chronic obstructive pulmonary disease)     Depression     Diabetes mellitus     Emphysema of lung     Heart failure     Hepatomegaly 2/3/2016    Hernia     History of MI (myocardial infarction) 1/19/2016    Hypercapnic respiratory failure, chronic 11/16/2016    Hyperlipidemia     Hypertension     Iron deficiency anemia 2/3/2016    Myocardial infarction     Obesity     Peripheral vascular disease     Pneumonia     Polyneuropathy     Retinal detachment     OS    Septic shock 4/23/2017    Skin ulcer 3/18/2017    Tobacco dependence     Type II 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, not stated as uncontrolled(250.60)      Past Surgical History:   Procedure  Laterality Date    BREAST BIOPSY Left 10 yrs ago    benign    CATARACT EXTRACTION Left     OS      SECTION      x2    EYE SURGERY      HYSTERECTOMY      partial    OOPHORECTOMY      one ovary conserved    RETINAL DETACHMENT SURGERY      buckle --OS    TONSILLECTOMY       Family History   Problem Relation Age of Onset    Arthritis Father     Heart disease Father     Early death Sister 30        heart disease    Heart disease Sister 32        MI    Cancer Brother         Lung cancer    No Known Problems Daughter     Blindness Son         due to accident//    Arthritis Sister     Heart disease Sister     Crohn's disease Sister     Alcohol abuse Mother     Breast cancer Neg Hx     Glaucoma Neg Hx     Macular degeneration Neg Hx     Retinal detachment Neg Hx     Amblyopia Neg Hx     Diabetes Neg Hx     Cataracts Neg Hx     Hypertension Neg Hx     Strabismus Neg Hx     Stroke Neg Hx     Thyroid disease Neg Hx      Social History     Tobacco Use    Smoking status: Current Every Day Smoker     Packs/day: 0.30     Years: 50.00     Pack years: 15.00     Types: Cigarettes     Start date: 1968     Last attempt to quit: 2019     Years since quittin.6    Smokeless tobacco: Never Used   Substance Use Topics    Alcohol use: No     Alcohol/week: 0.0 standard drinks    Drug use: No     Review of Systems   Constitutional: Negative for fever.   HENT: Negative for sore throat.    Respiratory: Positive for cough and shortness of breath.    Cardiovascular: Positive for chest pain.   Gastrointestinal: Negative for nausea.   Genitourinary: Negative for dysuria.   Musculoskeletal: Negative for back pain.   Skin: Negative for rash.   Neurological: Negative for weakness.   Hematological: Does not bruise/bleed easily.       Physical Exam     Initial Vitals   BP Pulse Resp Temp SpO2   22 2358 22 2358 22 2358 22 0041 22 2358   (!) 191/98 84 (!) 24 97.9 °F (36.6  °C) 95 %      MAP       --                Physical Exam    Constitutional: She appears well-developed and well-nourished.  Non-toxic appearance. No distress.   HENT:   Head: Normocephalic and atraumatic.   Eyes: EOM are normal. Pupils are equal, round, and reactive to light.   Neck: Neck supple. No JVD present.   Normal range of motion.  Cardiovascular: Normal rate, regular rhythm, normal heart sounds and intact distal pulses. Exam reveals no gallop and no friction rub.    No murmur heard.  Pulmonary/Chest: Tachypnea noted. She has decreased breath sounds. She has no wheezes. She has no rhonchi. She has no rales.   Coarse sound and cough.   Abdominal: Abdomen is soft. Bowel sounds are normal. There is no abdominal tenderness. There is no rebound and no guarding.   Musculoskeletal:         General: Normal range of motion.      Cervical back: Normal range of motion and neck supple. No rigidity.     Neurological: She is alert and oriented to person, place, and time. She has normal strength and normal reflexes. No cranial nerve deficit or sensory deficit. She exhibits normal muscle tone. Coordination normal. GCS eye subscore is 4. GCS verbal subscore is 5. GCS motor subscore is 6.   Skin: Skin is warm and dry.   Psychiatric: She has a normal mood and affect. Her speech is normal and behavior is normal. She is not actively hallucinating.         ED Course   Procedures  Labs Reviewed   CBC W/ AUTO DIFFERENTIAL - Abnormal; Notable for the following components:       Result Value    MCHC 31.0 (*)     Mono # 1.2 (*)     Mono % 15.3 (*)     All other components within normal limits   COMPREHENSIVE METABOLIC PANEL - Abnormal; Notable for the following components:    Albumin 3.3 (*)     All other components within normal limits   B-TYPE NATRIURETIC PEPTIDE - Abnormal; Notable for the following components:     (*)     All other components within normal limits   CBC W/ AUTO DIFFERENTIAL - Abnormal; Notable for the  following components:    MCV 99 (*)     MCHC 30.7 (*)     Lymph # 0.6 (*)     Mono # 0.1 (*)     Gran % 89.4 (*)     Lymph % 8.3 (*)     Mono % 1.3 (*)     All other components within normal limits   INFLUENZA A & B BY MOLECULAR   TROPONIN I   SARS-COV-2 RNA AMPLIFICATION, QUAL   POCT GLUCOSE MONITORING CONTINUOUS          Imaging Results          X-Ray Chest AP Portable (Final result)  Result time 03/27/22 08:21:47    Final result by Greg Samuels MD (03/27/22 08:21:47)                 Impression:      Bibasilar airspace disease could reflect atelectasis, pulmonary edema, early pneumonia.      Electronically signed by: Greg Samuels  Date:    03/27/2022  Time:    08:21             Narrative:    EXAMINATION:  XR CHEST AP PORTABLE    CLINICAL HISTORY:  Asthma;    TECHNIQUE:  Single frontal view of the chest was performed.    COMPARISON:  04/15/2021    FINDINGS:  Chronic elevation right hemidiaphragm.  Bibasilar airspace disease.  Unchanged heart size.  Aortic arch atherosclerosis.  No significant pleural effusion.  No pneumothorax.                                 Medications   sodium chloride 0.9% flush 3 mL (has no administration in time range)   albuterol-ipratropium 2.5 mg-0.5 mg/3 mL nebulizer solution 3 mL (3 mLs Nebulization Given 3/27/22 1941)   fluticasone furoate-vilanteroL 100-25 mcg/dose diskus inhaler 1 puff (1 puff Inhalation Given 3/27/22 0716)   melatonin tablet 9 mg (has no administration in time range)   ondansetron injection 4 mg (has no administration in time range)   polyethylene glycol packet 17 g (17 g Oral Given 3/27/22 0906)   predniSONE tablet 40 mg (40 mg Oral Given 3/27/22 0906)   acetaminophen tablet 650 mg (650 mg Oral Given 3/27/22 1400)   apixaban tablet 5 mg (5 mg Oral Given 3/27/22 2120)   atorvastatin tablet 10 mg (10 mg Oral Given 3/27/22 0906)   clonazePAM tablet 0.5 mg (0.5 mg Oral Given 3/27/22 2124)   furosemide tablet 40 mg (40 mg Oral Given 3/27/22 0906)   gabapentin  capsule 600 mg (600 mg Oral Given 3/27/22 2119)   HYDROcodone-acetaminophen 7.5-325 mg per tablet 1 tablet (has no administration in time range)   lisinopriL tablet 20 mg (20 mg Oral Given 3/27/22 2120)   multivitamin tablet (1 tablet Oral Given 3/27/22 0906)   pantoprazole EC tablet 40 mg (40 mg Oral Given 3/27/22 0905)   potassium chloride SA CR tablet 20 mEq (20 mEq Oral Given 3/27/22 0906)   glucose chewable tablet 16 g (has no administration in time range)   glucose chewable tablet 24 g (has no administration in time range)   dextrose 50% injection 12.5 g (has no administration in time range)   dextrose 50% injection 25 g (has no administration in time range)   glucagon (human recombinant) injection 1 mg (has no administration in time range)   insulin aspart U-100 pen 0-5 Units (has no administration in time range)   cefTRIAXone (ROCEPHIN) 1 g/50 mL D5W IVPB (0 g Intravenous Stopped 3/27/22 1211)   albuterol-ipratropium 2.5 mg-0.5 mg/3 mL nebulizer solution 3 mL (3 mLs Nebulization Given 3/27/22 0053)   methylPREDNISolone sodium succinate injection 125 mg (125 mg Intravenous Given 3/27/22 0045)   furosemide injection 40 mg (40 mg Intravenous Given 3/27/22 0217)     Medical Decision Making:   History:   Old Medical Records: I decided to obtain old medical records.  Initial Assessment:   71-year-old with history of CHF/COPD presents emergency department for evaluation of worsening shortness of breath.  She is given breathing treatments with mild improvement.  BNP is elevated and patient has a wet sounding cough with chest x-ray suspicious for pulmonary edema.  I suspect she is suffering from congestive heart failure exacerbation.  I do not think she has pneumonia.  She is given diuretics in the emergency department.  Discussed Hospital Medicine who agrees to admit the patient for further diuretic therapy and respiratory support.                      Clinical Impression:   Final diagnoses:  [R07.9] Chest  pain  [I50.9] Congestive heart failure, unspecified HF chronicity, unspecified heart failure type (Primary)  [J44.9] Chronic obstructive pulmonary disease, unspecified COPD type          ED Disposition Condition    Observation               Lex Ridley MD  03/27/22 4037

## 2022-03-27 NOTE — PLAN OF CARE
Pt refused bed alarm. Pts ambulates without calling for assistance. Gait unsteady. Refuses to wear oxygen and non-skid socks. Instructed pt on the importance of the call light. Charge nurse notified.

## 2022-03-27 NOTE — CARE UPDATE
Pt seen and examined. Labs and diagnostics reviewed.    Subjective: Pt reports cough that is not prod. Pt denies HA, CP, SOB, chills, fever, reported she is still fatigued. Denies n/v/d/ palpitations. Requested an order be written to turn off the bed alarm because she has to urinate so frequently.    Objective: Sleeping soundly when I entered room. Resp reg and easy.  alert & oriented x 3 when she awakened, skin w/d/pale, CV RRR, lungs diminished in the bases, intermittent rales present. Abd obese, BS present x 4, normoactive, No BLE edema.    CMP- Glucose 220, CO2 30, all other results WNL. CBC WBC, H/H, Plt WNL. Mg/Phos WNL. POCT glucose fasting and AC meals- 259, 143 ( on prednisone 40mg PO).     CXR showed bibasilar airspace disease which could reflect early pneumonia so added Ceftriaxone.    Assessment : COPD exacerbation with poss pneumonia.    Plan: Duoneb, Oxygen, monitor O2 sats, continue home handheld inhalers, add steriod and antibiotic. Plan dc in am if O2 sats >93% and pt remains afebrile.

## 2022-03-27 NOTE — PLAN OF CARE
[facility]  Initial Discharge Assessment       Primary Care Provider: Constantine Bird MD    Admission Diagnosis: Chest pain [R07.9]  Chronic obstructive pulmonary disease, unspecified COPD type [J44.9]  Congestive heart failure, unspecified HF chronicity, unspecified heart failure type [I50.9]    Admission Date: 3/27/2022  Expected Discharge Date: Pt confirmed home address and that she lives with her daughter Maye Tirado [phone]. Pt denied HH and has a home walker and home O2 at 2 L. She does not know the company from which it came from. Pt uses [facility] pharmacy and verified Dr. Bird as PCP. Pt has [employer] insurance and signed the MOON form. Pts discharge plan is home with family. CM following.     Discharge Barriers Identified: None    Payor: [employer] MANAGED MEDICARE / Plan: [employer] MEDICARE PPO / Product Type: Medicare Advantage /     Extended Emergency Contact Information  Primary Emergency Contact: Maye Tirado  Address: [address] [place] [address] [id number] [place] [address]  Mobile Phone: [phone]  Relation: Daughter  Preferred language: English   needed? No  Secondary Emergency Contact: Sharda Finn  Mobile Phone: [phone]  Relation: Friend  Preferred language: English   needed? No    Discharge Plan A: Home with family  Discharge Plan B: Home      [id number] - [place], [place] - [address] AT [facility] & [facility] [address]  [address]  [facility] [address]  Phone: [phone] Fax: [phone]    [facility] - [place], [place] - [address]  [address] [facility] [address]  Phone: [phone] Fax: [phone]    [id number] - [place] - [address] AT [address] & [facility]  [address]  [facility] [address]  Phone: [phone] Fax: [phone]    [id number] - [place] - [address] [facility] [address]  [address] [address]  Phone: [phone] Fax: [phone]      Initial Assessment (most recent)     Adult Discharge Assessment - 03/27/22 1146        Discharge Assessment    Assessment Type Discharge Planning Assessment     Confirmed/corrected address, phone number and insurance Yes     Confirmed Demographics Correct on Facesheet     Source of Information patient     Does patient/caregiver understand observation status Yes     Communicated KRISTOPHER with patient/caregiver Yes     Reason For Admission chest pain     Lives With child(bonny), adult     Facility Arrived From: home     Do you expect to return to your current living situation? Yes     Do you have help at home or someone to help you manage your care at home? Yes     Who are your caregiver(s) and their phone number(s)? ilana Tirado [phone]     Prior to hospitilization cognitive status: Alert/Oriented     Current cognitive status: Alert/Oriented     Walking or Climbing Stairs Difficulty none     Dressing/Bathing Difficulty none     Home Layout Able to live on 1st floor     Equipment Currently Used at Home none     Readmission within 30 days? No     Patient currently being followed by outpatient case management? No     Do you currently have service(s) that help you manage your care at home? No     Do you take prescription medications? Yes     Do you have prescription coverage? Yes     Do you have any problems affording any of your prescribed medications? No     Is the patient taking medications as prescribed? yes     Who is going to help you get home at discharge? ilana Tirado [phone]     How do you get to doctors appointments? family or friend will provide;car, drives self     Are you on dialysis? No     Do you take coumadin? No     Discharge Plan A Home with family     Discharge Plan B Home     DME Needed Upon Discharge  none     Discharge Plan discussed with: Patient     Discharge Barriers Identified None         Relationship/Environment    Name(s) of Who Lives With Patient daughter Maye Tirado [phone]

## 2022-03-27 NOTE — SUBJECTIVE & OBJECTIVE
Past Medical History:   Diagnosis Date    *Atrial flutter     Angina pectoris 2017    Anxiety     Arthritis     Asthma     Atrial fibrillation     Back pain     Cataract     OD    CHF (congestive heart failure)     COPD (chronic obstructive pulmonary disease)     Depression     Diabetes mellitus     Emphysema of lung     Heart failure     Hepatomegaly 2/3/2016    Hernia     History of MI (myocardial infarction) 2016    Hypercapnic respiratory failure, chronic 2016    Hyperlipidemia     Hypertension     Iron deficiency anemia 2/3/2016    Myocardial infarction     Obesity     Peripheral vascular disease     Pneumonia     Polyneuropathy     Retinal detachment     OS    Septic shock 2017    Skin ulcer 3/18/2017    Tobacco dependence     Type II 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, not stated as uncontrolled(250.60)        Past Surgical History:   Procedure Laterality Date    BREAST BIOPSY Left 10 yrs ago    benign    CATARACT EXTRACTION Left     OS      SECTION      x2    EYE SURGERY      HYSTERECTOMY      partial    OOPHORECTOMY      one ovary conserved    RETINAL DETACHMENT SURGERY      buckle --OS    TONSILLECTOMY         Review of patient's allergies indicates:   Allergen Reactions    Dilaudid [hydromorphone] Anaphylaxis     Other reaction(s): Anaphylaxis  Other reaction(s): Unknown    Zyvox [linezolid in dextrose 5%] Shortness Of Breath       No current facility-administered medications on file prior to encounter.     Current Outpatient Medications on File Prior to Encounter   Medication Sig    albuterol (ACCUNEB) 0.63 mg/3 mL Nebu Take 3 mLs (0.63 mg total) by nebulization every 6 (six) hours as needed. Rescue    albuterol (PROVENTIL/VENTOLIN HFA) 90 mcg/actuation inhaler Inhale 2 puffs into the lungs every 6 (six) hours as needed for Wheezing. Rescue    amitriptyline (ELAVIL) 50 MG tablet TAKE 1 TABLET(50 MG) BY MOUTH EVERY NIGHT AS NEEDED FOR  INSOMNIA    apixaban (ELIQUIS) 5 mg Tab Take 1 tablet (5 mg total) by mouth 2 (two) times daily.    ascorbic acid, vitamin C, (VITAMIN C) 500 MG tablet Take 2 tablets (1,000 mg total) by mouth every evening.    atorvastatin (LIPITOR) 10 MG tablet Take 1 tablet (10 mg total) by mouth every other day.    budesonide (PULMICORT) 0.5 mg/2 mL nebulizer solution Take 2 mLs (0.5 mg total) by nebulization daily 2 hours after breakfast. Controller    clonazePAM (KLONOPIN) 0.5 MG tablet Take 1 tablet (0.5 mg total) by mouth 2 (two) times daily as needed for Anxiety.    DULoxetine (CYMBALTA) 30 MG capsule TAKE 1 CAPSULE EVERY DAY    fluticasone propionate (FLONASE) 50 mcg/actuation nasal spray SHAKE LIQUID AND USE 1 SPRAY(50 MCG) IN EACH NOSTRIL EVERY DAY    fluticasone-umeclidin-vilanter (TRELEGY ELLIPTA) 100-62.5-25 mcg DsDv INHALE 1 PUFF INTO THE LUNGS ONE TIME DAILY    furosemide (LASIX) 40 MG tablet TAKE 1 TABLET(40 MG) BY MOUTH EVERY DAY    gabapentin (NEURONTIN) 600 MG tablet Take 1 tablet (600 mg total) by mouth 3 (three) times daily.    gabapentin (NEURONTIN) 800 MG tablet TAKE 1 TABLET(800 MG) BY MOUTH THREE TIMES DAILY    HYDROcodone-acetaminophen (NORCO) 7.5-325 mg per tablet Take 1 tablet by mouth every 12 (twelve) hours as needed for Pain. Medical necessary for greater than 7 days for chronic pain. Hydrocodone is contraindicated with Klonopin.    levalbuterol (XOPENEX) 0.63 mg/3 mL nebulizer solution USE 1 VIAL VIA NEBULIZER FOUR TIMES DAILY    LIDOcaine (LIDODERM) 5 % REMOVE AND DISCARD PATCH WITHIN 12 HOURS OR AS DIRECTED BY MD    lisinopriL (PRINIVIL,ZESTRIL) 20 MG tablet TAKE 1 TABLET TWICE DAILY    metFORMIN (GLUCOPHAGE) 500 MG tablet TAKE 1 TABLET (500 MG TOTAL) BY MOUTH DAILY WITH BREAKFAST.    multivitamin (THERAGRAN) tablet Take 1 tablet by mouth once daily.    nystatin (NYSTOP) powder Apply topically 2 (two) times daily.    nystatin-triamcinolone (MYCOLOG II) cream Apply topically 3 (three) times daily.  to affected area    omeprazole (PRILOSEC) 20 MG capsule Take 1 capsule (20 mg total) by mouth once daily.    ondansetron (ZOFRAN-ODT) 8 MG TbDL DISSOLVE 1 TABLET ON THE TONGUE EVERY 8 HOURS AS NEEDED    potassium chloride SA (K-DUR,KLOR-CON) 20 MEQ tablet TAKE 1 TABLET(20 MEQ) BY MOUTH EVERY DAY    semaglutide (OZEMPIC) 0.25 mg or 0.5 mg(2 mg/1.5 mL) pen injector Start 0.25 mg weekly x 2 weeks then 0.5 mg weekly therafter     Family History       Problem Relation (Age of Onset)    Alcohol abuse Mother    Arthritis Father, Sister    Blindness Son    Cancer Brother    Crohn's disease Sister    Early death Sister (30)    Heart disease Father, Sister (32), Sister    No Known Problems Daughter          Tobacco Use    Smoking status: Current Every Day Smoker     Packs/day: 0.30     Years: 50.00     Pack years: 15.00     Types: Cigarettes     Start date: 1968     Last attempt to quit: 2019     Years since quittin.6    Smokeless tobacco: Never Used   Substance and Sexual Activity    Alcohol use: No     Alcohol/week: 0.0 standard drinks    Drug use: No    Sexual activity: Not Currently     Review of Systems   Constitutional:  Positive for chills, fatigue and fever (subjective (t-max 100)). Negative for diaphoresis and unexpected weight change.   HENT:  Positive for congestion. Negative for sore throat and trouble swallowing.    Eyes:  Negative for visual disturbance.   Respiratory:  Positive for cough and shortness of breath. Negative for wheezing.    Cardiovascular:  Positive for chest pain and leg swelling (RLE). Negative for palpitations.   Gastrointestinal:  Negative for abdominal pain, diarrhea, nausea and vomiting.   Genitourinary:  Negative for difficulty urinating, dysuria and hematuria.   Musculoskeletal:  Positive for back pain (chronic, unchanged).   Skin:  Negative for rash.   Neurological:  Negative for dizziness, light-headedness and headaches.   Psychiatric/Behavioral:  Negative for confusion.     Objective:     Vital Signs (Most Recent):  Temp: 97.9 °F (36.6 °C) (03/27/22 0041)  Pulse: 78 (03/27/22 0227)  Resp: (!) 22 (03/27/22 0053)  BP: (!) 152/67 (03/27/22 0108)  SpO2: 99 % (03/27/22 0108)   Vital Signs (24h Range):  Temp:  [97.9 °F (36.6 °C)] 97.9 °F (36.6 °C)  Pulse:  [70-89] 78  Resp:  [22-24] 22  SpO2:  [87 %-100 %] 99 %  BP: (152-191)/() 152/67     Weight: 104.3 kg (230 lb)  Body mass index is 34.97 kg/m².    Physical Exam  Vitals and nursing note reviewed.   Constitutional:       General: She is not in acute distress.     Appearance: Normal appearance. She is obese.   HENT:      Head: Normocephalic and atraumatic.      Mouth/Throat:      Mouth: Mucous membranes are moist.      Pharynx: Oropharynx is clear.   Eyes:      Extraocular Movements: Extraocular movements intact.      Pupils: Pupils are equal, round, and reactive to light.   Cardiovascular:      Rate and Rhythm: Normal rate and regular rhythm.      Pulses: Normal pulses.      Heart sounds: Normal heart sounds.   Pulmonary:      Effort: Pulmonary effort is normal. Tachypnea present. No respiratory distress.      Breath sounds: Examination of the right-lower field reveals rales. Examination of the left-lower field reveals rales. Decreased breath sounds and rales present.   Abdominal:      General: Abdomen is flat. Bowel sounds are normal.      Palpations: Abdomen is soft.      Tenderness: There is no abdominal tenderness.   Musculoskeletal:         General: Normal range of motion.      Cervical back: Normal range of motion and neck supple.      Right lower leg: No edema.      Left lower leg: No edema.   Skin:     General: Skin is warm.      Capillary Refill: Capillary refill takes 2 to 3 seconds.   Neurological:      General: No focal deficit present.      Mental Status: She is alert and oriented to person, place, and time. Mental status is at baseline.   Psychiatric:         Mood and Affect: Mood normal.         Behavior: Behavior  normal.         CRANIAL NERVES     CN III, IV, VI   Pupils are equal, round, and reactive to light.     Significant Labs: All pertinent labs within the past 24 hours have been reviewed.  CBC:   Recent Labs   Lab 03/27/22  0100   WBC 7.79   HGB 12.2   HCT 39.3        CMP:   Recent Labs   Lab 03/27/22  0100      K 4.0      CO2 26   GLU 92   BUN 12   CREATININE 0.6   CALCIUM 9.6   PROT 7.1   ALBUMIN 3.3*   BILITOT 0.7   ALKPHOS 103   AST 33   ALT 23   ANIONGAP 13   EGFRNONAA >60     Cardiac Markers:   Recent Labs   Lab 03/27/22  0100   *       Lipid Panel:   Recent Labs   Lab 03/25/22  1028   CHOL 89*   HDL 37*   LDLCALC 41.6*   TRIG 52   CHOLHDL 41.6     Troponin:   Recent Labs   Lab 03/27/22  0100   TROPONINI 0.016         Significant Imaging: I have reviewed all pertinent imaging results/findings within the past 24 hours.

## 2022-03-27 NOTE — PLAN OF CARE
03/27/22 1148   GARCIA Message   Medicare Outpatient and Observation Notification regarding financial responsibility Given to patient/caregiver;Explained to patient/caregiver;Signed/date by patient/caregiver   Date GARCIA was signed 03/27/22   Time GARCIA was signed 1142

## 2022-03-27 NOTE — ASSESSMENT & PLAN NOTE
Patient is identified as having Combined Systolic and Diastolic heart failure that is Acute on chronic. CHF is currently uncontrolled due to Rales/crackles on pulmonary exam. Latest ECHO performed and demonstrates- Results for orders placed during the hospital encounter of 12/14/19    Echo Color Flow Doppler? Yes    Interpretation Summary  · Eccentric left ventricular hypertrophy.  · The mean diastolic gradient across the mitral valve is 2 mmHg at a heart rate of bpm.  · Moderate left ventricular enlargement.  · Moderately decreased left ventricular systolic function. The estimated ejection fraction is 35%  · Grade III (severe) left ventricular diastolic dysfunction consistent with restrictive physiology.  · Moderate right ventricular enlargement.  · Low normal right ventricular systolic function.  · Moderate left atrial enlargement.  · Moderate right atrial enlargement.  · Moderate-to-severe mitral regurgitation.  · Moderate to severe tricuspid regurgitation.  · Moderate pulmonary hypertension present.  · Moderate pulmonic regurgitation.  · Elevated central venous pressure (15 mm Hg).  · The estimated PA systolic pressure is 57 mm Hg    1) atrial  Fib  Dilated LV   EF 35%  2) moderate severe MR  3) moderate pulmonary hi bp  . Continue ACE/ARB and Furosemide and monitor clinical status closely. Monitor on telemetry. Patient is off CHF pathway.  Monitor strict Is&Os and daily weights.  Place on fluid restriction of 1.5 L. Continue to stress to patient importance of self efficacy and  on diet for CHF. Last BNP reviewed- and noted below   Recent Labs   Lab 03/27/22  0100   *   .

## 2022-03-27 NOTE — H&P
[facility]  [facility] Medicine  History & Physical    Patient Name: Nelly Tian  MRN: [id number]  Patient Class: OP- Observation  Admission Date: 3/27/2022  Attending Physician: Ree Corrales MD   Primary Care Provider: Constantine Bird MD         Patient information was obtained from patient, relative(s), past medical records and ER records.     Subjective:     Principal Problem:Chronic obstructive pulmonary disease with acute exacerbation    Chief Complaint:   Chief Complaint   Patient presents with    Shortness of Breath     1 duoneb treatment with EMS for increased RR and RA 93% also c/o chest discomfort        HPI: Nelly Tian is a 71 y.o. female with PMHx of COPD on 2 L NC O2, CHF, HTN, HLD, T2DM, and a-fib on Eliquis who presented to the ED via EMS with SOB onset x 3 wks and CP onset x 2130 3/26.  EMS noted room air saturations 93% and increased respiratory rate.  At triage O2 sats were 87%. She has been taking her breathing treatments at home with minimal improvement. She states she has had URI symptoms with increased cough and shortness of breath over the past 3-4 days. She describes the left sided CP as a sharp sensation that does not radiate. Alleviating factors include moving. Denies exacerbating factors. Pain was 9/10 at its worst and is currently 3/10. She also reports subjective fever ( t-max 100.0F), chills, fatigue, nasal congestion, productive cough (green sputum), RLE edema, and chronic, unchanged back pain. She denies diaphoresis, n/v/d, palpitations, unexpected weight gain, and HA. Patient is a current smoker and smokes approximately 1 ppd. ED workup was significant for hypoxia, elevated BNP, and tachypnea. The patient was placed in observation under the care of Hospital Medicine.                    Past Medical History:   Diagnosis Date    *Atrial flutter     Angina pectoris 9/18/2017    Anxiety     Arthritis     Asthma     Atrial fibrillation     Back pain      Cataract     OD    CHF (congestive heart failure)     COPD (chronic obstructive pulmonary disease)     Depression     Diabetes mellitus     Emphysema of lung     Heart failure     Hepatomegaly 2/3/2016    Hernia     History of MI (myocardial infarction) 2016    Hypercapnic respiratory failure, chronic 2016    Hyperlipidemia     Hypertension     Iron deficiency anemia 2/3/2016    Myocardial infarction     Obesity     Peripheral vascular disease     Pneumonia     Polyneuropathy     Retinal detachment     OS    Septic shock 2017    Skin ulcer 3/18/2017    Tobacco dependence     Type II 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, not stated as uncontrolled(250.60)        Past Surgical History:   Procedure Laterality Date    BREAST BIOPSY Left 10 yrs ago    benign    CATARACT EXTRACTION Left     OS      SECTION      x2    EYE SURGERY      HYSTERECTOMY      partial    OOPHORECTOMY      one ovary conserved    RETINAL DETACHMENT SURGERY      buckle --OS    TONSILLECTOMY         Review of patient's allergies indicates:   Allergen Reactions    Dilaudid [hydromorphone] Anaphylaxis     Other reaction(s): Anaphylaxis  Other reaction(s): Unknown    Zyvox [linezolid in dextrose 5%] Shortness Of Breath       No current facility-administered medications on file prior to encounter.     Current Outpatient Medications on File Prior to Encounter   Medication Sig    albuterol (ACCUNEB) 0.63 mg/3 mL Nebu Take 3 mLs (0.63 mg total) by nebulization every 6 (six) hours as needed. Rescue    albuterol (PROVENTIL/VENTOLIN HFA) 90 mcg/actuation inhaler Inhale 2 puffs into the lungs every 6 (six) hours as needed for Wheezing. Rescue    amitriptyline (ELAVIL) 50 MG tablet TAKE 1 TABLET(50 MG) BY MOUTH EVERY NIGHT AS NEEDED FOR INSOMNIA    apixaban (ELIQUIS) 5 mg Tab Take 1 tablet (5 mg total) by mouth 2 (two) times daily.    ascorbic acid, vitamin C,  (VITAMIN C) 500 MG tablet Take 2 tablets (1,000 mg total) by mouth every evening.    atorvastatin (LIPITOR) 10 MG tablet Take 1 tablet (10 mg total) by mouth every other day.    budesonide (PULMICORT) 0.5 mg/2 mL nebulizer solution Take 2 mLs (0.5 mg total) by nebulization daily 2 hours after breakfast. Controller    clonazePAM (KLONOPIN) 0.5 MG tablet Take 1 tablet (0.5 mg total) by mouth 2 (two) times daily as needed for Anxiety.    DULoxetine (CYMBALTA) 30 MG capsule TAKE 1 CAPSULE EVERY DAY    fluticasone propionate (FLONASE) 50 mcg/actuation nasal spray SHAKE LIQUID AND USE 1 SPRAY(50 MCG) IN EACH NOSTRIL EVERY DAY    fluticasone-umeclidin-vilanter (TRELEGY ELLIPTA) 100-62.5-25 mcg DsDv INHALE 1 PUFF INTO THE LUNGS ONE TIME DAILY    furosemide (LASIX) 40 MG tablet TAKE 1 TABLET(40 MG) BY MOUTH EVERY DAY    gabapentin (NEURONTIN) 600 MG tablet Take 1 tablet (600 mg total) by mouth 3 (three) times daily.    gabapentin (NEURONTIN) 800 MG tablet TAKE 1 TABLET(800 MG) BY MOUTH THREE TIMES DAILY    HYDROcodone-acetaminophen (NORCO) 7.5-325 mg per tablet Take 1 tablet by mouth every 12 (twelve) hours as needed for Pain. Medical necessary for greater than 7 days for chronic pain. Hydrocodone is contraindicated with Klonopin.    levalbuterol (XOPENEX) 0.63 mg/3 mL nebulizer solution USE 1 VIAL VIA NEBULIZER FOUR TIMES DAILY    LIDOcaine (LIDODERM) 5 % REMOVE AND DISCARD PATCH WITHIN 12 HOURS OR AS DIRECTED BY MD    lisinopriL (PRINIVIL,ZESTRIL) 20 MG tablet TAKE 1 TABLET TWICE DAILY    metFORMIN (GLUCOPHAGE) 500 MG tablet TAKE 1 TABLET (500 MG TOTAL) BY MOUTH DAILY WITH BREAKFAST.    multivitamin (THERAGRAN) tablet Take 1 tablet by mouth once daily.    nystatin (NYSTOP) powder Apply topically 2 (two) times daily.    nystatin-triamcinolone (MYCOLOG II) cream Apply topically 3 (three) times daily. to affected area    omeprazole (PRILOSEC) 20 MG capsule Take 1 capsule (20 mg total) by mouth once daily.     ondansetron (ZOFRAN-ODT) 8 MG TbDL DISSOLVE 1 TABLET ON THE TONGUE EVERY 8 HOURS AS NEEDED    potassium chloride SA (K-DUR,KLOR-CON) 20 MEQ tablet TAKE 1 TABLET(20 MEQ) BY MOUTH EVERY DAY    semaglutide (OZEMPIC) 0.25 mg or 0.5 mg(2 mg/1.5 mL) pen injector Start 0.25 mg weekly x 2 weeks then 0.5 mg weekly therafter     Family History       Problem Relation (Age of Onset)    Alcohol abuse Mother    Arthritis Father, Sister    Blindness Son    Cancer Brother    Crohn's disease Sister    Early death Sister (30)    Heart disease Father, Sister (32), Sister    No Known Problems Daughter          Tobacco Use    Smoking status: Current Every Day Smoker     Packs/day: 0.30     Years: 50.00     Pack years: 15.00     Types: Cigarettes     Start date: 1968     Last attempt to quit: 2019     Years since quittin.6    Smokeless tobacco: Never Used   Substance and Sexual Activity    Alcohol use: No     Alcohol/week: 0.0 standard drinks    Drug use: No    Sexual activity: Not Currently     Review of Systems   Constitutional:  Positive for chills, fatigue and fever (subjective (t-max 100)). Negative for diaphoresis and unexpected weight change.   HENT:  Positive for congestion. Negative for sore throat and trouble swallowing.    Eyes:  Negative for visual disturbance.   Respiratory:  Positive for cough and shortness of breath. Negative for wheezing.    Cardiovascular:  Positive for chest pain and leg swelling (RLE). Negative for palpitations.   Gastrointestinal:  Negative for abdominal pain, diarrhea, nausea and vomiting.   Genitourinary:  Negative for difficulty urinating, dysuria and hematuria.   Musculoskeletal:  Positive for back pain (chronic, unchanged).   Skin:  Negative for rash.   Neurological:  Negative for dizziness, light-headedness and headaches.   Psychiatric/Behavioral:  Negative for confusion.    Objective:     Vital Signs (Most Recent):  Temp: 97.9 °F (36.6 °C) (22 0041)  Pulse: 78  (03/27/22 0227)  Resp: (!) 22 (03/27/22 0053)  BP: (!) 152/67 (03/27/22 0108)  SpO2: 99 % (03/27/22 0108)   Vital Signs (24h Range):  Temp:  [97.9 °F (36.6 °C)] 97.9 °F (36.6 °C)  Pulse:  [70-89] 78  Resp:  [22-24] 22  SpO2:  [87 %-100 %] 99 %  BP: (152-191)/() 152/67     Weight: 104.3 kg (230 lb)  Body mass index is 34.97 kg/m².    Physical Exam  Vitals and nursing note reviewed.   Constitutional:       General: She is not in acute distress.     Appearance: Normal appearance. She is obese.   HENT:      Head: Normocephalic and atraumatic.      Mouth/Throat:      Mouth: Mucous membranes are moist.      Pharynx: Oropharynx is clear.   Eyes:      Extraocular Movements: Extraocular movements intact.      Pupils: Pupils are equal, round, and reactive to light.   Cardiovascular:      Rate and Rhythm: Normal rate and regular rhythm.      Pulses: Normal pulses.      Heart sounds: Normal heart sounds.   Pulmonary:      Effort: Pulmonary effort is normal. Tachypnea present. No respiratory distress.      Breath sounds: Examination of the right-lower field reveals rales. Examination of the left-lower field reveals rales. Decreased breath sounds and rales present.   Abdominal:      General: Abdomen is flat. Bowel sounds are normal.      Palpations: Abdomen is soft.      Tenderness: There is no abdominal tenderness.   Musculoskeletal:         General: Normal range of motion.      Cervical back: Normal range of motion and neck supple.      Right lower leg: No edema.      Left lower leg: No edema.   Skin:     General: Skin is warm.      Capillary Refill: Capillary refill takes 2 to 3 seconds.   Neurological:      General: No focal deficit present.      Mental Status: She is alert and oriented to person, place, and time. Mental status is at baseline.   Psychiatric:         Mood and Affect: Mood normal.         Behavior: Behavior normal.         CRANIAL NERVES     CN III, IV, VI   Pupils are equal, round, and reactive to  light.     Significant Labs: All pertinent labs within the past 24 hours have been reviewed.  CBC:   Recent Labs   Lab 03/27/22  0100   WBC 7.79   HGB 12.2   HCT 39.3        CMP:   Recent Labs   Lab 03/27/22  0100      K 4.0      CO2 26   GLU 92   BUN 12   CREATININE 0.6   CALCIUM 9.6   PROT 7.1   ALBUMIN 3.3*   BILITOT 0.7   ALKPHOS 103   AST 33   ALT 23   ANIONGAP 13   EGFRNONAA >60     Cardiac Markers:   Recent Labs   Lab 03/27/22  0100   *       Lipid Panel:   Recent Labs   Lab 03/25/22  1028   CHOL 89*   HDL 37*   LDLCALC 41.6*   TRIG 52   CHOLHDL 41.6     Troponin:   Recent Labs   Lab 03/27/22  0100   TROPONINI 0.016         Significant Imaging: I have reviewed all pertinent imaging results/findings within the past 24 hours.    Assessment/Plan:     * Chronic obstructive pulmonary disease with acute exacerbation  Patient's COPD is with exacerbation noted by continued dyspnea and use of accessory muscles for breathing currently.  Patient is currently on COPD Pathway. Continue scheduled inhalers Steroids and Supplemental oxygen and monitor respiratory status closely.         Acute on chronic respiratory failure with hypoxia  Patient with Hypoxic Respiratory failure which is Acute on chronic.  she is on home oxygen at 2 LPM. Supplemental oxygen was provided and noted-  .   Signs/symptoms of respiratory failure include- tachypnea and increased work of breathing. Contributing diagnoses includes - CHF and COPD Labs and images were reviewed. Patient Has not had a recent ABG. Will treat underlying causes and adjust management of respiratory failure as follows- supplemental oxygen, bronchodilators, steroids, procal    Acute on chronic diastolic congestive heart failure  Patient is identified as having Combined Systolic and Diastolic heart failure that is Acute on chronic. CHF is currently uncontrolled due to Rales/crackles on pulmonary exam. Latest ECHO performed and demonstrates- Results for  orders placed during the hospital encounter of 12/14/19    Echo Color Flow Doppler? Yes    Interpretation Summary  · Eccentric left ventricular hypertrophy.  · The mean diastolic gradient across the mitral valve is 2 mmHg at a heart rate of bpm.  · Moderate left ventricular enlargement.  · Moderately decreased left ventricular systolic function. The estimated ejection fraction is 35%  · Grade III (severe) left ventricular diastolic dysfunction consistent with restrictive physiology.  · Moderate right ventricular enlargement.  · Low normal right ventricular systolic function.  · Moderate left atrial enlargement.  · Moderate right atrial enlargement.  · Moderate-to-severe mitral regurgitation.  · Moderate to severe tricuspid regurgitation.  · Moderate pulmonary hypertension present.  · Moderate pulmonic regurgitation.  · Elevated central venous pressure (15 mm Hg).  · The estimated PA systolic pressure is 57 mm Hg    1) atrial  Fib  Dilated LV   EF 35%  2) moderate severe MR  3) moderate pulmonary hi bp  . Continue ACE/ARB and Furosemide and monitor clinical status closely. Monitor on telemetry. Patient is off CHF pathway.  Monitor strict Is&Os and daily weights.  Place on fluid restriction of 1.5 L. Continue to stress to patient importance of self efficacy and  on diet for CHF. Last BNP reviewed- and noted below   Recent Labs   Lab 03/27/22  0100   *   .      Chest pain  Acute:  - initial troponin: 0.016  - trending troponin  - BNP: 550  - ECHO ordered      Chronic prescription opiate use  Noted.       Chronically on benzodiazepine therapy  Noted.       Hyperlipidemia associated with type 2 diabetes mellitus   Patient is chronically on statin.will continue for now. Monitor clinically. Last LDL was   Lab Results   Component Value Date    LDLCALC 41.6 (L) 03/25/2022            Type 2 diabetes mellitus with diabetic neuropathy, without long-term current use of insulin  Patient's FSGs are controlled on  current medication regimen.  Last A1c reviewed-   Lab Results   Component Value Date    HGBA1C 5.1 03/09/2022     Most recent fingerstick glucose reviewed- No results for input(s): POCTGLUCOSE in the last 24 hours.  Current correctional scale  Low  Maintain anti-hyperglycemic dose as follows-   Antihyperglycemics (From admission, onward)            Start     Stop Route Frequency Ordered    03/27/22 0407  insulin aspart U-100 pen 0-5 Units         -- SubQ Before meals & nightly PRN 03/27/22 0307        Hold Oral hypoglycemics while patient is in the hospital.        Hypertension associated with diabetes  Chronic, controlled.  Latest blood pressure and vitals reviewed-   Temp:  [97.9 °F (36.6 °C)]   Pulse:  [70-89]   Resp:  [22-24]   BP: (152-191)/()   SpO2:  [87 %-100 %] .   Home meds for hypertension were reviewed and noted below.   Hypertension Medications             furosemide (LASIX) 40 MG tablet TAKE 1 TABLET(40 MG) BY MOUTH EVERY DAY    lisinopriL (PRINIVIL,ZESTRIL) 20 MG tablet TAKE 1 TABLET TWICE DAILY          While in the hospital, will manage blood pressure as follows; Continue home antihypertensive regimen    Will utilize p.r.n. blood pressure medication only if patient's blood pressure greater than  180/110 and she develops symptoms such as worsening chest pain or shortness of breath.        Chronic atrial fibrillation  Patient with atrial fibrillation which is controlled currently with Beta Blocker. Patient is currently in sinus rhythm.[id number] Score: 3. Anticoagulation indicated. Anticoagulation done with Eliquis.        Tobacco dependency  Dangers of cigarette smoking were reviewed with patient in detail for 10 minutes and patient was encouraged to quit. Nicotine replacement options were discussed.        GERD (gastroesophageal reflux disease)  Chronic:  - continue PPI      Major depression, recurrent, chronic  Chronic, controlled:  - continue home medications      Long term current use of  anticoagulant therapy  Chronic:  - continue Eliquis        VTE Risk Mitigation (From admission, onward)         Ordered     apixaban tablet 5 mg  2 times daily         03/27/22 0301     Reason for No Pharmacological VTE Prophylaxis  Once        Question:  Reasons:  Answer:  Already adequately anticoagulated on oral Anticoagulants    03/27/22 0257     IP VTE HIGH RISK PATIENT  Once         03/27/22 0257     Place sequential compression device  Until discontinued         03/27/22 0257                   Glenys Issa PA-C  Department of Hospital Medicine   [facility] - Emergency Dept

## 2022-03-28 VITALS
OXYGEN SATURATION: 94 % | TEMPERATURE: 97 F | WEIGHT: 236 LBS | SYSTOLIC BLOOD PRESSURE: 137 MMHG | HEIGHT: 68 IN | RESPIRATION RATE: 18 BRPM | DIASTOLIC BLOOD PRESSURE: 73 MMHG | BODY MASS INDEX: 35.77 KG/M2 | HEART RATE: 82 BPM

## 2022-03-28 LAB
ALBUMIN SERPL BCP-MCNC: 3.3 G/DL (ref 3.5–5.2)
ALP SERPL-CCNC: 94 U/L (ref 55–135)
ALT SERPL W/O P-5'-P-CCNC: 21 U/L (ref 10–44)
ANION GAP SERPL CALC-SCNC: 9 MMOL/L (ref 8–16)
AORTIC ROOT ANNULUS: 3.12 CM
AORTIC VALVE CUSP SEPERATION: 2.12 CM
AST SERPL-CCNC: 23 U/L (ref 10–40)
AV INDEX (PROSTH): 0.54
AV MEAN GRADIENT: 6 MMHG
AV PEAK GRADIENT: 11 MMHG
AV VALVE AREA: 1.67 CM2
AV VELOCITY RATIO: 0.52
BASOPHILS # BLD AUTO: 0.02 K/UL (ref 0–0.2)
BASOPHILS NFR BLD: 0.2 % (ref 0–1.9)
BILIRUB SERPL-MCNC: 0.4 MG/DL (ref 0.1–1)
BSA FOR ECHO PROCEDURE: 2.27 M2
BUN SERPL-MCNC: 18 MG/DL (ref 8–23)
CALCIUM SERPL-MCNC: 9.7 MG/DL (ref 8.7–10.5)
CHLORIDE SERPL-SCNC: 98 MMOL/L (ref 95–110)
CO2 SERPL-SCNC: 32 MMOL/L (ref 23–29)
CREAT SERPL-MCNC: 0.6 MG/DL (ref 0.5–1.4)
CV ECHO LV RWT: 0.3 CM
DIFFERENTIAL METHOD: ABNORMAL
DOP CALC AO PEAK VEL: 1.65 M/S
DOP CALC AO VTI: 32.38 CM
DOP CALC LVOT AREA: 3.1 CM2
DOP CALC LVOT DIAMETER: 1.99 CM
DOP CALC LVOT PEAK VEL: 0.86 M/S
DOP CALC LVOT STROKE VOLUME: 53.97 CM3
DOP CALC MV VTI: 30.26 CM
DOP CALCLVOT PEAK VEL VTI: 17.36 CM
E WAVE DECELERATION TIME: 211.83 MSEC
E/A RATIO: 3.88
ECHO LV POSTERIOR WALL: 0.98 CM (ref 0.6–1.1)
EJECTION FRACTION: 25 %
EOSINOPHIL # BLD AUTO: 0 K/UL (ref 0–0.5)
EOSINOPHIL NFR BLD: 0.3 % (ref 0–8)
ERYTHROCYTE [DISTWIDTH] IN BLOOD BY AUTOMATED COUNT: 14.1 % (ref 11.5–14.5)
EST. GFR  (AFRICAN AMERICAN): >60 ML/MIN/1.73 M^2
EST. GFR  (NON AFRICAN AMERICAN): >60 ML/MIN/1.73 M^2
FRACTIONAL SHORTENING: 16 % (ref 28–44)
GLUCOSE SERPL-MCNC: 92 MG/DL (ref 70–110)
HCT VFR BLD AUTO: 39.3 % (ref 37–48.5)
HGB BLD-MCNC: 11.9 G/DL (ref 12–16)
IMM GRANULOCYTES # BLD AUTO: 0.02 K/UL (ref 0–0.04)
IMM GRANULOCYTES NFR BLD AUTO: 0.2 % (ref 0–0.5)
INTERVENTRICULAR SEPTUM: 0.84 CM (ref 0.6–1.1)
LA MAJOR: 6.75 CM
LA WIDTH: 5.94 CM
LEFT ATRIUM SIZE: 5.37 CM
LEFT ATRIUM VOLUME INDEX MOD: 67.6 ML/M2
LEFT ATRIUM VOLUME MOD: 148.05 CM3
LEFT INTERNAL DIMENSION IN SYSTOLE: 5.56 CM (ref 2.1–4)
LEFT VENTRICLE DIASTOLIC VOLUME INDEX: 102.47 ML/M2
LEFT VENTRICLE DIASTOLIC VOLUME: 224.42 ML
LEFT VENTRICLE MASS INDEX: 118 G/M2
LEFT VENTRICLE SYSTOLIC VOLUME INDEX: 68.9 ML/M2
LEFT VENTRICLE SYSTOLIC VOLUME: 150.88 ML
LEFT VENTRICULAR INTERNAL DIMENSION IN DIASTOLE: 6.61 CM (ref 3.5–6)
LEFT VENTRICULAR MASS: 258.75 G
LYMPHOCYTES # BLD AUTO: 1.3 K/UL (ref 1–4.8)
LYMPHOCYTES NFR BLD: 13.1 % (ref 18–48)
MAGNESIUM SERPL-MCNC: 2.1 MG/DL (ref 1.6–2.6)
MCH RBC QN AUTO: 30.4 PG (ref 27–31)
MCHC RBC AUTO-ENTMCNC: 30.3 G/DL (ref 32–36)
MCV RBC AUTO: 100 FL (ref 82–98)
MONOCYTES # BLD AUTO: 1.4 K/UL (ref 0.3–1)
MONOCYTES NFR BLD: 14.9 % (ref 4–15)
MV MEAN GRADIENT: 1 MMHG
MV PEAK A VEL: 0.25 M/S
MV PEAK E VEL: 0.97 M/S
MV PEAK GRADIENT: 7 MMHG
MV STENOSIS PRESSURE HALF TIME: 74.28 MS
MV VALVE AREA BY CONTINUITY EQUATION: 1.78 CM2
MV VALVE AREA P 1/2 METHOD: 2.96 CM2
NEUTROPHILS # BLD AUTO: 6.9 K/UL (ref 1.8–7.7)
NEUTROPHILS NFR BLD: 71.3 % (ref 38–73)
NRBC BLD-RTO: 0 /100 WBC
PHOSPHATE SERPL-MCNC: 4.5 MG/DL (ref 2.7–4.5)
PISA MRMAX VEL: 0.06 M/S
PISA TR MAX VEL: 2.53 M/S
PLATELET # BLD AUTO: 167 K/UL (ref 150–450)
PMV BLD AUTO: 10.7 FL (ref 9.2–12.9)
POTASSIUM SERPL-SCNC: 4.1 MMOL/L (ref 3.5–5.1)
PROT SERPL-MCNC: 6.9 G/DL (ref 6–8.4)
PV PEAK VELOCITY: 0.91 CM/S
RA MAJOR: 4.98 CM
RA PRESSURE: 8 MMHG
RA WIDTH: 5.02 CM
RBC # BLD AUTO: 3.92 M/UL (ref 4–5.4)
RIGHT VENTRICULAR END-DIASTOLIC DIMENSION: 3.7 CM
SODIUM SERPL-SCNC: 139 MMOL/L (ref 136–145)
TR MAX PG: 26 MMHG
TRICUSPID ANNULAR PLANE SYSTOLIC EXCURSION: 1.07 CM
TV REST PULMONARY ARTERY PRESSURE: 34 MMHG
WBC # BLD AUTO: 9.65 K/UL (ref 3.9–12.7)

## 2022-03-28 PROCEDURE — 83735 ASSAY OF MAGNESIUM: CPT

## 2022-03-28 PROCEDURE — 94761 N-INVAS EAR/PLS OXIMETRY MLT: CPT

## 2022-03-28 PROCEDURE — 63600175 PHARM REV CODE 636 W HCPCS

## 2022-03-28 PROCEDURE — 96366 THER/PROPH/DIAG IV INF ADDON: CPT

## 2022-03-28 PROCEDURE — 94640 AIRWAY INHALATION TREATMENT: CPT

## 2022-03-28 PROCEDURE — 94618 PULMONARY STRESS TESTING: CPT

## 2022-03-28 PROCEDURE — 27000221 HC OXYGEN, UP TO 24 HOURS

## 2022-03-28 PROCEDURE — 36415 COLL VENOUS BLD VENIPUNCTURE: CPT

## 2022-03-28 PROCEDURE — 63600175 PHARM REV CODE 636 W HCPCS: Performed by: NURSE PRACTITIONER

## 2022-03-28 PROCEDURE — 85025 COMPLETE CBC W/AUTO DIFF WBC: CPT

## 2022-03-28 PROCEDURE — 80053 COMPREHEN METABOLIC PANEL: CPT

## 2022-03-28 PROCEDURE — 25000003 PHARM REV CODE 250

## 2022-03-28 PROCEDURE — 25000242 PHARM REV CODE 250 ALT 637 W/ HCPCS

## 2022-03-28 PROCEDURE — 84100 ASSAY OF PHOSPHORUS: CPT

## 2022-03-28 PROCEDURE — G0378 HOSPITAL OBSERVATION PER HR: HCPCS

## 2022-03-28 RX ORDER — AMOXICILLIN AND CLAVULANATE POTASSIUM 875; 125 MG/1; MG/1
1 TABLET, FILM COATED ORAL 2 TIMES DAILY
Qty: 14 TABLET | Refills: 0 | Status: SHIPPED | OUTPATIENT
Start: 2022-03-28 | End: 2022-03-28 | Stop reason: HOSPADM

## 2022-03-28 RX ORDER — PREDNISONE 5 MG/1
TABLET ORAL
Qty: 105 TABLET | Refills: 0 | Status: SHIPPED | OUTPATIENT
Start: 2022-05-30 | End: 2022-03-28 | Stop reason: HOSPADM

## 2022-03-28 RX ORDER — PREDNISONE 5 MG/1
TABLET ORAL
Qty: 105 TABLET | Refills: 0 | Status: SHIPPED | OUTPATIENT
Start: 2022-03-28 | End: 2022-04-22

## 2022-03-28 RX ORDER — AMOXICILLIN AND CLAVULANATE POTASSIUM 875; 125 MG/1; MG/1
1 TABLET, FILM COATED ORAL 2 TIMES DAILY
Qty: 20 TABLET | Refills: 0 | Status: SHIPPED | OUTPATIENT
Start: 2022-03-28 | End: 2022-04-07

## 2022-03-28 RX ADMIN — CEFTRIAXONE 1 G: 1 INJECTION, SOLUTION INTRAVENOUS at 09:03

## 2022-03-28 RX ADMIN — POLYETHYLENE GLYCOL 3350 17 G: 17 POWDER, FOR SOLUTION ORAL at 09:03

## 2022-03-28 RX ADMIN — GABAPENTIN 600 MG: 300 CAPSULE ORAL at 09:03

## 2022-03-28 RX ADMIN — PREDNISONE 40 MG: 20 TABLET ORAL at 09:03

## 2022-03-28 RX ADMIN — IPRATROPIUM BROMIDE AND ALBUTEROL SULFATE 3 ML: 2.5; .5 SOLUTION RESPIRATORY (INHALATION) at 06:03

## 2022-03-28 RX ADMIN — FUROSEMIDE 40 MG: 40 TABLET ORAL at 09:03

## 2022-03-28 RX ADMIN — POTASSIUM CHLORIDE 20 MEQ: 1500 TABLET, EXTENDED RELEASE ORAL at 09:03

## 2022-03-28 RX ADMIN — FLUTICASONE FUROATE AND VILANTEROL TRIFENATATE 1 PUFF: 100; 25 POWDER RESPIRATORY (INHALATION) at 06:03

## 2022-03-28 RX ADMIN — PANTOPRAZOLE SODIUM 40 MG: 40 TABLET, DELAYED RELEASE ORAL at 09:03

## 2022-03-28 RX ADMIN — THERA TABS 1 TABLET: TAB at 09:03

## 2022-03-28 RX ADMIN — APIXABAN 5 MG: 2.5 TABLET, FILM COATED ORAL at 09:03

## 2022-03-28 RX ADMIN — LISINOPRIL 20 MG: 10 TABLET ORAL at 09:03

## 2022-03-28 NOTE — RESPIRATORY THERAPY
Patient and patient's daughter educated on the use  Of  Home 02 tanks    And the safety of home 02 tanks.

## 2022-03-28 NOTE — DISCHARGE SUMMARY
[facility] Medicine  Discharge Summary      Patient Name: Nelly Tian  MRN: [id number]  Patient Class: OP- Observation  Admission Date: 3/27/2022  Hospital Length of Stay: 0 days  Discharge Date and Time:  03/28/2022 6:12 PM  Attending Physician: Derick Middleton MD   Discharging Provider: Ese Scott NP  Primary Care Provider: Constantine Bird MD      HPI:   Nelly Tian is a 71 y.o. female with PMHx of COPD on 2 L NC O2, CHF, HTN, HLD, T2DM, and a-fib on Eliquis who presented to the ED via EMS with SOB onset x 3 wks and CP onset x 2130 3/26.  EMS noted room air saturations 93% and increased respiratory rate.  At triage O2 sats were 87%. She has been taking her breathing treatments at home with minimal improvement. She states she has had URI symptoms with increased cough and shortness of breath over the past 3-4 days. She describes the left sided CP as a sharp sensation that does not radiate. Alleviating factors include moving. Denies exacerbating factors. Pain was 9/10 at its worst and is currently 3/10. She also reports subjective fever ( t-max 100.0F), chills, fatigue, nasal congestion, productive cough (green sputum), RLE edema, and chronic, unchanged back pain. She denies diaphoresis, n/v/d, palpitations, unexpected weight gain, and HA. Patient is a current smoker and smokes approximately 1 ppd. ED workup was significant for hypoxia, elevated BNP, and tachypnea. The patient was placed in observation under the care of Hospital Medicine.                    * No surgery found *      Hospital Course:   Nelly Tian was admitted on 3/27/2022 with SOB and CP, she had been increasing her breathing tx. Despite this her O2 sats began declining, she has a low grade fever, and had cough prod of green sputum. On adm CXR showed bibasilar airspace disease which could be reflective of pulm edema or an early pneumonia. Pt was started on IV rocephin and steroids. Her covid  test and procalcitonin were negative. Her BNP was also mildly elevated at 550 so topronins were trended and were negative and an ECHO was ordered. Echo compared with Echo from 12/2019 showed ej fx dec 35 --> 25%, summary from card similar findings.     Pt was discharged on Augmentin, Steroid taper over 4 weeks, all her previous COPD medications. No other medication changes were made. She reported she had appointment with PCP on 4/5/2022 and she was strongly encouraged to keep this appt. Pt reported she has Home o2 and an ambulatory sat was completed and Home O2 was reordered. Cont to stress importance of smoking cessation. Resumed on anticoagulant.        Goals of Care Treatment Preferences:  Code Status: Full Code    Living Will: Yes              Consults:     No new Assessment & Plan notes have been filed under this hospital service since the last note was generated.  Service: Hospital Medicine    Final Active Diagnoses:    Diagnosis Date Noted POA    PRINCIPAL PROBLEM:  Chronic obstructive pulmonary disease with acute exacerbation [J44.1]  Yes    Chronic prescription opiate use [Z79.891] 05/04/2019 Not Applicable    Chronically on benzodiazepine therapy [Z79.899] 05/04/2019 Not Applicable    Chest pain [R07.9] 09/17/2017 Yes    Acute on chronic respiratory failure with hypoxia [J96.21] 04/23/2017 Yes    Acute on chronic diastolic congestive heart failure [I50.33] 02/14/2017 Yes    Type 2 diabetes mellitus with diabetic neuropathy, without long-term current use of insulin [E11.40] 11/02/2016 Yes    Hyperlipidemia associated with type 2 diabetes mellitus [E11.69, E78.5] 11/02/2016 Yes    Hypertension associated with diabetes [E11.59, I15.2] 01/19/2016 Yes    Chronic atrial fibrillation [I48.20] 11/10/2015 Yes    Tobacco dependency [F17.200] 12/18/2014 Yes    GERD (gastroesophageal reflux disease) [K21.9] 11/13/2014 Yes    Major depression, recurrent, chronic [F33.9]  Yes    Long term current use of  "anticoagulant therapy [Z79.01] 10/03/2012 Not Applicable      Problems Resolved During this Admission:       Discharged Condition: stable    Disposition: Home or Self Care    Follow Up:   Follow-up Information     Constantine Bird MD Follow up on 4/5/2022.    Specialty: Family Medicine  Why: keep scheduled appointment  Contact information:  [place] [address]  [phone]             [facility].    Specialty: DME Provider  Contact information:  [address]  [facility]  [facility] [id number] [phone]                       Patient Instructions:      OXYGEN FOR HOME USE     Order Specific Question Answer Comments   Liter Flow 2    Duration Continuous    Qualifying Test Performed at: Activity    Oxygen saturation at rest 93    Oxygen saturation with activity 89    Oxygen saturation with activity on oxygen 94    Portable mode: continuous    Route nasal cannula    Device: home concentrator with portable tanks    Length of need (in months): 99 mos    Patient condition with qualifying saturation COPD    Height: 5' 8" (1.727 m)    Weight: 107 kg (236 lb)    Alternative treatment measures have been tried or considered and deemed clinically ineffective. Yes      Diet diabetic     Activity as tolerated       Significant Diagnostic Studies: Labs:   CMP   Recent Labs   Lab 03/27/22  0100 03/27/22  0350 03/28/22  0546    141 139   K 4.0 3.6 4.1    97 98   CO2 26 30* 32*   GLU 92 220* 92   BUN 12 12 18   CREATININE 0.6 0.7 0.6   CALCIUM 9.6 10.1 9.7   PROT 7.1 8.0 6.9   ALBUMIN 3.3* 3.8 3.3*   BILITOT 0.7 0.9 0.4   ALKPHOS 103 120 94   AST 33 29 23   ALT 23 24 21   ANIONGAP 13 14 9   ESTGFRAFRICA >60 >60 >60   EGFRNONAA >60 >60 >60    and CBC   Recent Labs   Lab 03/27/22  0100 03/27/22  0350 03/28/22  0546   WBC 7.79 7.58 9.65   HGB 12.2 12.7 11.9*   HCT 39.3 41.4 39.3    172 167       Pending Diagnostic Studies:     None         Medications:  Reconciled Home Medications:    "   Medication List      START taking these medications    amoxicillin-clavulanate 875-125mg 875-125 mg per tablet  Commonly known as: AUGMENTIN  Take 1 tablet by mouth 2 (two) times daily. for 10 days     predniSONE 5 MG tablet  Commonly known as: DELTASONE  Take 8 tablets (40 mg total) by mouth once daily for 5 days, THEN 6 tablets (30 mg total) once daily for 5 days, THEN 4 tablets (20 mg total) once daily for 5 days, THEN 2 tablets (10 mg total) once daily for 5 days, THEN 1 tablet (5 mg total) once daily for 5 days.  Start taking on: March 28, 2022        CHANGE how you take these medications    gabapentin 600 MG tablet  Commonly known as: NEURONTIN  Take 1 tablet (600 mg total) by mouth 3 (three) times daily.  What changed: Another medication with the same name was removed. Continue taking this medication, and follow the directions you see here.        CONTINUE taking these medications    * albuterol 0.63 mg/3 mL Nebu  Commonly known as: ACCUNEB  Take 3 mLs (0.63 mg total) by nebulization every 6 (six) hours as needed. Rescue     * albuterol 90 mcg/actuation inhaler  Commonly known as: PROVENTIL/VENTOLIN HFA  Inhale 2 puffs into the lungs every 6 (six) hours as needed for Wheezing. Rescue     amitriptyline 50 MG tablet  Commonly known as: ELAVIL  TAKE 1 TABLET(50 MG) BY MOUTH EVERY NIGHT AS NEEDED FOR INSOMNIA     apixaban 5 mg Tab  Commonly known as: ELIQUIS  Take 1 tablet (5 mg total) by mouth 2 (two) times daily.     ascorbic acid (vitamin C) 500 MG tablet  Commonly known as: VITAMIN C  Take 2 tablets (1,000 mg total) by mouth every evening.     atorvastatin 10 MG tablet  Commonly known as: LIPITOR  Take 1 tablet (10 mg total) by mouth every other day.     budesonide 0.5 mg/2 mL nebulizer solution  Commonly known as: PULMICORT  Take 2 mLs (0.5 mg total) by nebulization daily 2 hours after breakfast. Controller     clonazePAM 0.5 MG tablet  Commonly known as: KlonoPIN  Take 1 tablet (0.5 mg total) by mouth 2  (two) times daily as needed for Anxiety.     DULoxetine 30 MG capsule  Commonly known as: CYMBALTA  TAKE 1 CAPSULE EVERY DAY     fluticasone propionate 50 mcg/actuation nasal spray  Commonly known as: FLONASE  SHAKE LIQUID AND USE 1 SPRAY(50 MCG) IN EACH NOSTRIL EVERY DAY     fluticasone-umeclidin-vilanter 100-62.5-25 mcg Dsdv  Commonly known as: TRELEGY ELLIPTA  INHALE 1 PUFF INTO THE LUNGS ONE TIME DAILY     furosemide 40 MG tablet  Commonly known as: LASIX  TAKE 1 TABLET(40 MG) BY MOUTH EVERY DAY     HYDROcodone-acetaminophen 7.5-325 mg per tablet  Commonly known as: NORCO  Take 1 tablet by mouth every 12 (twelve) hours as needed for Pain. Medical necessary for greater than 7 days for chronic pain. Hydrocodone is contraindicated with Klonopin.     levalbuterol 0.63 mg/3 mL nebulizer solution  Commonly known as: XOPENEX  USE 1 VIAL VIA NEBULIZER FOUR TIMES DAILY     LIDOcaine 5 %  Commonly known as: LIDODERM  REMOVE AND DISCARD PATCH WITHIN 12 HOURS OR AS DIRECTED BY MD     lisinopriL 20 MG tablet  Commonly known as: PRINIVIL,ZESTRIL  TAKE 1 TABLET TWICE DAILY     metFORMIN 500 MG tablet  Commonly known as: GLUCOPHAGE  TAKE 1 TABLET (500 MG TOTAL) BY MOUTH DAILY WITH BREAKFAST.     multivitamin tablet  Commonly known as: THERAGRAN  Take 1 tablet by mouth once daily.     nystatin powder  Commonly known as: NYSTOP  Apply topically 2 (two) times daily.     nystatin-triamcinolone cream  Commonly known as: MYCOLOG II  Apply topically 3 (three) times daily. to affected area     omeprazole 20 MG capsule  Commonly known as: PRILOSEC  Take 1 capsule (20 mg total) by mouth once daily.     ondansetron 8 MG Tbdl  Commonly known as: ZOFRAN-ODT  DISSOLVE 1 TABLET ON THE TONGUE EVERY 8 HOURS AS NEEDED     potassium chloride SA 20 MEQ tablet  Commonly known as: K-DUR,KLOR-CON  TAKE 1 TABLET(20 MEQ) BY MOUTH EVERY DAY     semaglutide 0.25 mg or 0.5 mg(2 mg/1.5 mL) pen injector  Commonly known as: OZEMPIC  Start 0.25 mg weekly x 2  weeks then 0.5 mg weekly therafter         * This list has 2 medication(s) that are the same as other medications prescribed for you. Read the directions carefully, and ask your doctor or other care provider to review them with you.                Indwelling Lines/Drains at time of discharge:   Lines/Drains/Airways     None                 Time spent on the discharge of patient: 45 minutes         Ese Scott NP  Department of Hospital Medicine  [facility]

## 2022-03-28 NOTE — NURSING
Pt cleared for discharge. Oxygen with pt. IV and telemetry removed without complication. Pt reports no complaints.

## 2022-03-28 NOTE — CARE UPDATE
03/27/22 1941   Patient Assessment/Suction   Level of Consciousness (AVPU) alert   Respiratory Effort Normal;Unlabored   Expansion/Accessory Muscles/Retractions no retractions;no use of accessory muscles   All Lung Fields Breath Sounds diminished   Rhythm/Pattern, Respiratory pattern regular;depth regular   Cough Frequency infrequent   PRE-TX-O2   O2 Device (Oxygen Therapy) nasal cannula   $ Is the patient on Low Flow Oxygen? Yes   Flow (L/min) 2   SpO2 95 %   Pulse Oximetry Type Intermittent   Pulse (!) 58   Resp 16   Aerosol Therapy   $ Aerosol Therapy Charges Aerosol Treatment   Daily Review of Necessity (SVN) completed   Respiratory Treatment Status (SVN) given   Treatment Route (SVN) mask   Patient Position (SVN) sitting on edge of bed   Post Treatment Assessment (SVN) breath sounds unchanged   Signs of Intolerance (SVN) none   Breath Sounds Post-Respiratory Treatment   Throughout All Fields Post-Treatment All Fields   Throughout All Fields Post-Treatment no change   Post-treatment Heart Rate (beats/min) 62   Post-treatment Resp Rate (breaths/min) 18

## 2022-03-28 NOTE — CARE UPDATE
03/28/22 1241   Home Oxygen Qualification   Room Air SpO2 At Rest 93 %   Room Air SpO2 During Ambulation (!) 89 %   SpO2 During Ambulation on O2 94 %   Heart Rate on O2 71 bpm   Ambulation O2 LPM 2 LPM   SpO2 Post Ambulation 96 %   Post Ambulation Heart Rate 67 bpm   Post Ambulation O2 LPM 2 LPM

## 2022-03-28 NOTE — PLAN OF CARE
Pt being discharged to home per MD order. Echo and walk-study completed. Oxygen orders being carried through. CM to clear. Antibiotics administered as ordered. Discharge paperwork reviewed with pt an questions addressed. Pt is ambulatory in room with SBA. No safety issues this shift. Vitals are stable. Will transport to personal vehicle when cleared by CM.

## 2022-03-28 NOTE — PLAN OF CARE
Cm communicated with Wily MATHEW pt need a home O2 walk test for O2 tanks. Cm contacted [facility] MATHEW [facility]. Cm updated Ese DAVENPORT, NP cm will follow-up.

## 2022-03-28 NOTE — CARE UPDATE
03/28/22 1248   Patient Assessment/Suction   Level of Consciousness (AVPU) alert   Respiratory Effort Normal   All Lung Fields Breath Sounds diminished;equal bilaterally   Rhythm/Pattern, Respiratory unlabored   PRE-TX-O2   O2 Device (Oxygen Therapy) nasal cannula   Flow (L/min) 2   SpO2 (!) 94 %   Pulse Oximetry Type Intermittent   $ Pulse Oximetry - Multiple Charge Pulse Oximetry - Multiple   Pulse 82  (after recovering from walk)   Resp 18   Home Oxygen Qualification   $ Home O2 Qualification Pulmonary Stress Test/6 min walk   Room Air SpO2 At Rest 94 %   Room Air SpO2 During Ambulation (!) 87 %   SpO2 During Ambulation on O2 93 %   Heart Rate on O2 90 bpm   Ambulation O2 LPM 2 LPM   SpO2 Post Ambulation 94 %  (on NC 2 l)   Post Ambulation Heart Rate 102 bpm   Post Ambulation O2 LPM 2 LPM   Home O2 Eval Comments qualifies for home O2   Patient walked on room air with SPO2 dropping to 87, . Recovery over 2 to 3 minutes with SPO2 coming up to 93-94% on 2 LPM. Recovery after 5 min with SPO at 94-95% HR 80's. Pt currently wears home O2 as needed.

## 2022-03-28 NOTE — PLAN OF CARE
"Fracisco communicated with pt about home oxygen. Pt verba;berthaed she does not have tanks and her portable o2 was broken and she did not replace it. Pt thinks it's with [facility] or [facility]. Fracisco called [facility] and spoke with Peyman at [phone]. Per Peyman, pt does not have home oxygen with them. Cm contacted [facility] via secure messaging. Pending response. Fracisco will follow-up.          11:10am  Cm communicated with [facility]. Ok to pull 1 tank. Pt has an Ultrafill (which she can refill her tanks) fracisco informed Wily that pt verbalized, "she does not have tanks. Wily will call pt to verify home oxygen according to information he has on file. Wily will contact fracisco after conversation with pt. Fracisco will follow-up.  "

## 2022-03-28 NOTE — PLAN OF CARE
Pt is cleared from CM for discharge.  Appointments are in avs.  Respiratory communication-spoke with PADMINI Wren to educate pt on home oxygen.       03/28/22 1311   Final Note   Assessment Type Final Discharge Note   Anticipated Discharge Disposition Home   Hospital Resources/Appts/Education Provided Appointments scheduled by Navigator/Coordinator

## 2022-03-28 NOTE — CARE UPDATE
03/28/22 0651   Patient Assessment/Suction   Level of Consciousness (AVPU) alert   Respiratory Effort Normal;Unlabored   All Lung Fields Breath Sounds coarse;equal bilaterally   PRE-TX-O2   O2 Device (Oxygen Therapy) nasal cannula   $ Is the patient on Low Flow Oxygen? Yes   Flow (L/min) 2   SpO2 96 %   Pulse Oximetry Type Intermittent   $ Pulse Oximetry - Multiple Charge Pulse Oximetry - Multiple   Pulse 66   Resp 16   Aerosol Therapy   $ Aerosol Therapy Charges Aerosol Treatment   Respiratory Treatment Status (SVN) given   Treatment Route (SVN) mask;oxygen   Patient Position (SVN) HOB elevated   Inhaler   $ Inhaler Charges MDI (Metered Dose Inahler) Treatment   Respiratory Treatment Status (Inhaler) given   Treatment Route (Inhaler) mouthpiece   Patient Position (Inhaler) HOB elevated   Post Treatment Assessment (Inhaler) breath sounds unchanged   Breath Sounds Post-Respiratory Treatment   Throughout All Fields Post-Treatment All Fields   Throughout All Fields Post-Treatment no change   Post-treatment Heart Rate (beats/min) 64   Post-treatment Resp Rate (breaths/min) 16

## 2022-03-28 NOTE — PLAN OF CARE
Pt is alert and oriented, ambulates to the bathroom with assistance. Tele monitored, had a 8 run Vtach , informed NP. Hr now 50's. Pt has no complaints of chest pain, VS stable, and pt resting. Bed alarm is set, call light in reach.

## 2022-03-28 NOTE — PLAN OF CARE
Juan Francisco received a message from [facility] to pull 1 o3 tank and set up. Cm delivered the oxygen to pt , pt signed the rental agreement.  JUAN FRANCISCO ordered respiratory communication to educate pt on home oxygen.  JUAN FRANCISCO updated JUANITA Richardson.       03/28/22 3062   Post-Acute Status   Post-Acute Authorization HME   HME Status Set-up Complete/Auth obtained

## 2022-03-28 NOTE — HOSPITAL COURSE
Nelly Tian was admitted on 3/27/2022 with SOB and CP, she had been increasing her breathing tx. Despite this her O2 sats began declining, she has a low grade fever, and had cough prod of green sputum. On adm CXR showed bibasilar airspace disease which could be reflective of pulm edema or an early pneumonia. Pt was started on IV rocephin and steroids. Her covid test and procalcitonin were negative. Her BNP was also mildly elevated at 550 so topronins were trended and were negative and an ECHO was ordered. Echo compared with Echo from 12/2019 showed ej fx dec 35 --> 25%, summary from card similar findings.     Pt was discharged on Augmentin, Steroid taper over 4 weeks, all her previous COPD medications. No other medication changes were made. She reported she had appointment with PCP on 4/5/2022 and she was strongly encouraged to keep this appt. Pt reported she has Home o2 and an ambulatory sat was completed and Home O2 was reordered. Cont to stress importance of smoking cessation. Resumed on anticoagulant.

## 2022-03-29 ENCOUNTER — TELEPHONE (OUTPATIENT)
Dept: MEDSURG UNIT | Facility: HOSPITAL | Age: 72
End: 2022-03-29
Payer: MEDICARE

## 2022-04-04 ENCOUNTER — PATIENT MESSAGE (OUTPATIENT)
Dept: FAMILY MEDICINE | Facility: CLINIC | Age: 72
End: 2022-04-04
Payer: MEDICARE

## 2022-04-05 ENCOUNTER — OFFICE VISIT (OUTPATIENT)
Dept: FAMILY MEDICINE | Facility: CLINIC | Age: 72
End: 2022-04-05
Payer: MEDICARE

## 2022-04-05 VITALS
HEIGHT: 68 IN | HEART RATE: 76 BPM | BODY MASS INDEX: 33.95 KG/M2 | TEMPERATURE: 98 F | WEIGHT: 224 LBS | SYSTOLIC BLOOD PRESSURE: 128 MMHG | DIASTOLIC BLOOD PRESSURE: 64 MMHG | RESPIRATION RATE: 16 BRPM | OXYGEN SATURATION: 95 %

## 2022-04-05 DIAGNOSIS — E11.59 HYPERTENSION ASSOCIATED WITH DIABETES: ICD-10-CM

## 2022-04-05 DIAGNOSIS — E66.01 SEVERE OBESITY (BMI 35.0-35.9 WITH COMORBIDITY): ICD-10-CM

## 2022-04-05 DIAGNOSIS — M51.36 LUMBAR DEGENERATIVE DISC DISEASE: ICD-10-CM

## 2022-04-05 DIAGNOSIS — I15.2 HYPERTENSION ASSOCIATED WITH DIABETES: ICD-10-CM

## 2022-04-05 DIAGNOSIS — F41.1 GAD (GENERALIZED ANXIETY DISORDER): ICD-10-CM

## 2022-04-05 DIAGNOSIS — G89.4 CHRONIC PAIN SYNDROME: ICD-10-CM

## 2022-04-05 DIAGNOSIS — I50.42 CHRONIC COMBINED SYSTOLIC AND DIASTOLIC CHF (CONGESTIVE HEART FAILURE): ICD-10-CM

## 2022-04-05 DIAGNOSIS — Z91.89 PNEUMOCOCCAL VACCINATION INDICATED: Primary | ICD-10-CM

## 2022-04-05 DIAGNOSIS — Z23 NEED FOR PNEUMOCOCCAL VACCINATION: ICD-10-CM

## 2022-04-05 DIAGNOSIS — J44.0 CHRONIC OBSTRUCTIVE PULMONARY DISEASE WITH LOWER RESPIRATORY INFECTION: ICD-10-CM

## 2022-04-05 DIAGNOSIS — F17.200 TOBACCO DEPENDENCE: ICD-10-CM

## 2022-04-05 DIAGNOSIS — M85.80 OSTEOPENIA AFTER MENOPAUSE: ICD-10-CM

## 2022-04-05 DIAGNOSIS — Z78.0 OSTEOPENIA AFTER MENOPAUSE: ICD-10-CM

## 2022-04-05 DIAGNOSIS — F33.9 MAJOR DEPRESSION, RECURRENT, CHRONIC: ICD-10-CM

## 2022-04-05 DIAGNOSIS — K45.8 FLANK HERNIA: ICD-10-CM

## 2022-04-05 DIAGNOSIS — G93.41 ENCEPHALOPATHY, METABOLIC: ICD-10-CM

## 2022-04-05 DIAGNOSIS — E11.51 TYPE 2 DIABETES MELLITUS WITH DIABETIC PERIPHERAL ANGIOPATHY WITHOUT GANGRENE, WITHOUT LONG-TERM CURRENT USE OF INSULIN: ICD-10-CM

## 2022-04-05 DIAGNOSIS — Z12.11 COLON CANCER SCREENING: ICD-10-CM

## 2022-04-05 DIAGNOSIS — F17.200 TOBACCO DEPENDENCY: ICD-10-CM

## 2022-04-05 DIAGNOSIS — E11.610 DIABETIC NEUROGENIC ARTHROPATHY: ICD-10-CM

## 2022-04-05 DIAGNOSIS — I70.0 ABDOMINAL AORTIC ATHEROSCLEROSIS: ICD-10-CM

## 2022-04-05 DIAGNOSIS — Z12.31 OTHER SCREENING MAMMOGRAM: ICD-10-CM

## 2022-04-05 PROCEDURE — 3066F PR DOCUMENTATION OF TREATMENT FOR NEPHROPATHY: ICD-10-PCS | Mod: CPTII,S$GLB,, | Performed by: FAMILY MEDICINE

## 2022-04-05 PROCEDURE — 1126F PR PAIN SEVERITY QUANTIFIED, NO PAIN PRESENT: ICD-10-PCS | Mod: CPTII,S$GLB,, | Performed by: FAMILY MEDICINE

## 2022-04-05 PROCEDURE — 4010F ACE/ARB THERAPY RXD/TAKEN: CPT | Mod: CPTII,S$GLB,, | Performed by: FAMILY MEDICINE

## 2022-04-05 PROCEDURE — 3078F DIAST BP <80 MM HG: CPT | Mod: CPTII,S$GLB,, | Performed by: FAMILY MEDICINE

## 2022-04-05 PROCEDURE — 1101F PR PT FALLS ASSESS DOC 0-1 FALLS W/OUT INJ PAST YR: ICD-10-PCS | Mod: CPTII,S$GLB,, | Performed by: FAMILY MEDICINE

## 2022-04-05 PROCEDURE — 3060F POS MICROALBUMINURIA REV: CPT | Mod: CPTII,S$GLB,, | Performed by: FAMILY MEDICINE

## 2022-04-05 PROCEDURE — 3044F PR MOST RECENT HEMOGLOBIN A1C LEVEL <7.0%: ICD-10-PCS | Mod: CPTII,S$GLB,, | Performed by: FAMILY MEDICINE

## 2022-04-05 PROCEDURE — 99999 PR PBB SHADOW E&M-EST. PATIENT-LVL V: CPT | Mod: PBBFAC,,, | Performed by: FAMILY MEDICINE

## 2022-04-05 PROCEDURE — 3288F FALL RISK ASSESSMENT DOCD: CPT | Mod: CPTII,S$GLB,, | Performed by: FAMILY MEDICINE

## 2022-04-05 PROCEDURE — 99214 PR OFFICE/OUTPT VISIT, EST, LEVL IV, 30-39 MIN: ICD-10-PCS | Mod: S$GLB,,, | Performed by: FAMILY MEDICINE

## 2022-04-05 PROCEDURE — 1101F PT FALLS ASSESS-DOCD LE1/YR: CPT | Mod: CPTII,S$GLB,, | Performed by: FAMILY MEDICINE

## 2022-04-05 PROCEDURE — 90732 PNEUMOCOCCAL POLYSACCHARIDE VACCINE 23-VALENT =>2YO SQ IM: ICD-10-PCS | Mod: S$GLB,,, | Performed by: FAMILY MEDICINE

## 2022-04-05 PROCEDURE — 1157F PR ADVANCE CARE PLAN OR EQUIV PRESENT IN MEDICAL RECORD: ICD-10-PCS | Mod: CPTII,S$GLB,, | Performed by: FAMILY MEDICINE

## 2022-04-05 PROCEDURE — 1157F ADVNC CARE PLAN IN RCRD: CPT | Mod: CPTII,S$GLB,, | Performed by: FAMILY MEDICINE

## 2022-04-05 PROCEDURE — 3008F BODY MASS INDEX DOCD: CPT | Mod: CPTII,S$GLB,, | Performed by: FAMILY MEDICINE

## 2022-04-05 PROCEDURE — 99999 PR PBB SHADOW E&M-EST. PATIENT-LVL V: ICD-10-PCS | Mod: PBBFAC,,, | Performed by: FAMILY MEDICINE

## 2022-04-05 PROCEDURE — 3044F HG A1C LEVEL LT 7.0%: CPT | Mod: CPTII,S$GLB,, | Performed by: FAMILY MEDICINE

## 2022-04-05 PROCEDURE — G0009 ADMIN PNEUMOCOCCAL VACCINE: HCPCS | Mod: S$GLB,,, | Performed by: FAMILY MEDICINE

## 2022-04-05 PROCEDURE — 3288F PR FALLS RISK ASSESSMENT DOCUMENTED: ICD-10-PCS | Mod: CPTII,S$GLB,, | Performed by: FAMILY MEDICINE

## 2022-04-05 PROCEDURE — 1126F AMNT PAIN NOTED NONE PRSNT: CPT | Mod: CPTII,S$GLB,, | Performed by: FAMILY MEDICINE

## 2022-04-05 PROCEDURE — 90732 PPSV23 VACC 2 YRS+ SUBQ/IM: CPT | Mod: S$GLB,,, | Performed by: FAMILY MEDICINE

## 2022-04-05 PROCEDURE — 3008F PR BODY MASS INDEX (BMI) DOCUMENTED: ICD-10-PCS | Mod: CPTII,S$GLB,, | Performed by: FAMILY MEDICINE

## 2022-04-05 PROCEDURE — G0009 PNEUMOCOCCAL POLYSACCHARIDE VACCINE 23-VALENT =>2YO SQ IM: ICD-10-PCS | Mod: S$GLB,,, | Performed by: FAMILY MEDICINE

## 2022-04-05 PROCEDURE — 3074F SYST BP LT 130 MM HG: CPT | Mod: CPTII,S$GLB,, | Performed by: FAMILY MEDICINE

## 2022-04-05 PROCEDURE — 99214 OFFICE O/P EST MOD 30 MIN: CPT | Mod: S$GLB,,, | Performed by: FAMILY MEDICINE

## 2022-04-05 PROCEDURE — 3060F PR POS MICROALBUMINURIA RESULT DOCUMENTED/REVIEW: ICD-10-PCS | Mod: CPTII,S$GLB,, | Performed by: FAMILY MEDICINE

## 2022-04-05 PROCEDURE — 3078F PR MOST RECENT DIASTOLIC BLOOD PRESSURE < 80 MM HG: ICD-10-PCS | Mod: CPTII,S$GLB,, | Performed by: FAMILY MEDICINE

## 2022-04-05 PROCEDURE — 3066F NEPHROPATHY DOC TX: CPT | Mod: CPTII,S$GLB,, | Performed by: FAMILY MEDICINE

## 2022-04-05 PROCEDURE — 3074F PR MOST RECENT SYSTOLIC BLOOD PRESSURE < 130 MM HG: ICD-10-PCS | Mod: CPTII,S$GLB,, | Performed by: FAMILY MEDICINE

## 2022-04-05 PROCEDURE — 4010F PR ACE/ARB THEARPY RXD/TAKEN: ICD-10-PCS | Mod: CPTII,S$GLB,, | Performed by: FAMILY MEDICINE

## 2022-04-05 RX ORDER — NYSTATIN AND TRIAMCINOLONE ACETONIDE 100000; 1 [USP'U]/G; MG/G
CREAM TOPICAL 3 TIMES DAILY
Qty: 30 G | Refills: 3 | Status: SHIPPED | OUTPATIENT
Start: 2022-04-05 | End: 2022-07-26 | Stop reason: SDUPTHER

## 2022-04-05 RX ORDER — HYDROCODONE BITARTRATE AND ACETAMINOPHEN 7.5; 325 MG/1; MG/1
1 TABLET ORAL EVERY 12 HOURS PRN
Qty: 60 TABLET | Refills: 0 | Status: SHIPPED | OUTPATIENT
Start: 2022-06-01 | End: 2022-06-28 | Stop reason: SDUPTHER

## 2022-04-05 RX ORDER — POTASSIUM CHLORIDE 20 MEQ/1
20 TABLET, EXTENDED RELEASE ORAL DAILY
Qty: 30 TABLET | Refills: 11 | Status: SHIPPED | OUTPATIENT
Start: 2022-04-05 | End: 2022-07-26 | Stop reason: SDUPTHER

## 2022-04-05 RX ORDER — DULAGLUTIDE 0.75 MG/.5ML
0.75 INJECTION, SOLUTION SUBCUTANEOUS
Qty: 4 PEN | Refills: 11 | Status: SHIPPED | OUTPATIENT
Start: 2022-04-05 | End: 2022-07-26 | Stop reason: ALTCHOICE

## 2022-04-05 RX ORDER — LIDOCAINE 50 MG/G
PATCH TOPICAL
Qty: 30 PATCH | Refills: 3 | Status: SHIPPED | OUTPATIENT
Start: 2022-04-05 | End: 2022-07-08 | Stop reason: SDUPTHER

## 2022-04-05 RX ORDER — BUSPIRONE HYDROCHLORIDE 5 MG/1
5 TABLET ORAL 2 TIMES DAILY
Qty: 60 TABLET | Refills: 11 | Status: SHIPPED | OUTPATIENT
Start: 2022-04-05 | End: 2022-07-26 | Stop reason: ALTCHOICE

## 2022-04-05 RX ORDER — HYDROCODONE BITARTRATE AND ACETAMINOPHEN 7.5; 325 MG/1; MG/1
1 TABLET ORAL EVERY 12 HOURS PRN
Qty: 60 TABLET | Refills: 0 | Status: SHIPPED | OUTPATIENT
Start: 2022-05-01 | End: 2022-05-31

## 2022-04-05 RX ORDER — CLONAZEPAM 0.5 MG/1
0.5 TABLET ORAL NIGHTLY
Qty: 30 TABLET | Refills: 1 | Status: SHIPPED | OUTPATIENT
Start: 2022-04-05 | End: 2022-06-08 | Stop reason: SDUPTHER

## 2022-04-05 NOTE — PROGRESS NOTES
Patient verified by name and . Patient received [id number] vaccine in right Deltoid. Patient tolerated injection well. Patient advised to wait in clinic for 15 minutes in case of adverse reactions. Patient demonstrated understanding.

## 2022-04-06 ENCOUNTER — TELEPHONE (OUTPATIENT)
Dept: FAMILY MEDICINE | Facility: CLINIC | Age: 72
End: 2022-04-06
Payer: MEDICARE

## 2022-04-12 ENCOUNTER — PATIENT OUTREACH (OUTPATIENT)
Dept: ADMINISTRATIVE | Facility: OTHER | Age: 72
End: 2022-04-12
Payer: MEDICARE

## 2022-04-19 PROBLEM — D69.2 OTHER NONTHROMBOCYTOPENIC PURPURA: Status: RESOLVED | Noted: 2019-04-25 | Resolved: 2022-04-19

## 2022-04-19 PROBLEM — Z79.899 CHRONICALLY ON BENZODIAZEPINE THERAPY: Status: RESOLVED | Noted: 2019-05-04 | Resolved: 2022-04-19

## 2022-04-19 PROBLEM — L89.893 PRESSURE ULCER OF OTHER SITE, STAGE 3: Status: RESOLVED | Noted: 2021-01-15 | Resolved: 2022-04-19

## 2022-04-19 PROBLEM — L89.90 DECUBITUS ULCER: Status: RESOLVED | Noted: 2018-08-23 | Resolved: 2022-04-19

## 2022-04-19 NOTE — PROGRESS NOTES
Subjective:       Patient ID: Nelly Tian is a 71 y.o. female.    Chief Complaint: Follow-up    Active Medical History:  SUBJECTIVE: Nelly Tian is a 70 y.o. female seen for a follow up visit; she has diabetes, hypertension, hyperlipidemia, peripheral vascular disease and obesity.  Endocrine ROS: positive for - malaise/lethargy, polydipsia/polyuria and skin changes  negative for - breast changes, hot flashes, mood swings or temperature intolerance  Diabetic Review of Systems - medication compliance: compliant most of the time, diabetic diet compliance: noncompliant some of the time, home glucose monitoring: is performed sporadically, further diabetic ROS: no chest pain, dyspnea or TIA's, no numbness, tingling or pain in extremities, no hypoglycemia, no medication side effects noted, weight has increased, has dysesthesias in the feet, last eye exam approximately less than 1 yr ago.  The patient complains of cough productive of clear sputum, with shortness of breath, with shortness of breath during the cough, waxing and waning over time for months ..    Current Outpatient Medications:  albuterol (ACCUNEB) 0.63 mg/3 mL Nebu, Take 3 mLs (0.63 mg total) by nebulization every 6 (six) hours as needed. Rescue, Disp: 75 mL, Rfl: 11  albuterol (PROVENTIL/VENTOLIN HFA) 90 mcg/actuation inhaler, Inhale 2 puffs into the lungs every 6 (six) hours as needed for Wheezing. Rescue, Disp: 54 g, Rfl: 3  apixaban (ELIQUIS) 5 mg Tab, Take 1 tablet (5 mg total) by mouth 2 (two) times daily., Disp: 180 tablet, Rfl: 3  ascorbic acid, vitamin C, (VITAMIN C) 500 MG tablet, Take 2 tablets (1,000 mg total) by mouth every evening., Disp: , Rfl:   atorvastatin (LIPITOR) 10 MG tablet, Take 1 tablet (10 mg total) by mouth every other day., Disp: 35 tablet, Rfl: 3  BREO ELLIPTA 100-25 mcg/dose diskus inhaler, INHALE 1 PUFF EVERY DAY, Disp: 60 each, Rfl: 1  budesonide (PULMICORT) 0.5 mg/2 mL nebulizer solution, INHALE THE CONTENTS  OF 1 VIAL VIA NEBULIZER EVERY DAY, Disp: 180 mL, Rfl: 0  clonazePAM (KLONOPIN) 0.5 MG tablet, Take 1 tablet (0.5 mg total) by mouth 2 (two) times daily as needed for Anxiety., Disp: 60 tablet, Rfl: 2  gabapentin (NEURONTIN) 600 MG tablet, Take 1 tablet (600 mg total) by mouth 3 (three) times daily., Disp: 270 tablet, Rfl: 3  HYDROcodone-acetaminophen (NORCO) 7.5-325 mg per tablet, Take 1 tablet by mouth nightly as needed for Pain. Medical necessary for greater than 7 days for chronic pain. Hydrocodone is contraindicated with Klonopin., Disp: 60 tablet, Rfl: 0  (START ON 11/6/2021) HYDROcodone-acetaminophen (NORCO) 7.5-325 mg per tablet, Take 1 tablet by mouth every 12 (twelve) hours as needed for Pain. Medical necessary for greater than 7 days for chronic pain. Hydrocodone is contraindicated with Klonopin., Disp: 60 tablet, Rfl: 0  levalbuterol (XOPENEX) 0.63 mg/3 mL nebulizer solution, INHALE THE CONTENTS OF 1 VIAL BY NEBULIZATION 4 times  DAILY.    DX: J43.9, Disp: 792 mL, Rfl: 3  LIDOcaine (LIDODERM) 5 %, Remove & Discard patch within 12 hours or as directed by MD, Disp: 60 patch, Rfl: 3  lisinopriL (PRINIVIL,ZESTRIL) 20 MG tablet, TAKE 1 TABLET TWICE DAILY, Disp: 180 tablet, Rfl: 4  metFORMIN (GLUCOPHAGE) 500 MG tablet, TAKE 1 TABLET (500 MG TOTAL) BY MOUTH DAILY WITH BREAKFAST., Disp: 90 tablet, Rfl: 3  multivitamin (THERAGRAN) tablet, Take 1 tablet by mouth once daily., Disp: , Rfl:   nystatin (NYSTOP) powder, Apply topically 2 (two) times daily., Disp: 120 g, Rfl: 11  nystatin-triamcinolone (MYCOLOG II) cream, Apply topically 3 (three) times daily. to affected area, Disp: 30 g, Rfl: 3  omeprazole (PRILOSEC) 20 MG capsule, TAKE 1 CAPSULE TWICE DAILY  BEFORE  MEALS, Disp: 180 capsule, Rfl: 3  ondansetron (ZOFRAN-ODT) 8 MG TbDL, DISSOLVE 1 TABLET ON THE TONGUE EVERY 8 HOURS AS NEEDED, Disp: 40 tablet, Rfl: 1  potassium chloride SA (K-DUR,KLOR-CON) 20 MEQ tablet, TAKE 1 TABLET(20 MEQ) BY MOUTH EVERY DAY, Disp: 30  tablet, Rfl: 11  TRELEGY ELLIPTA 100-62.5-25 mcg DsDv, INHALE 1 PUFF INTO THE LUNGS ONE TIME DAILY, Disp: 180 each, Rfl: 3  fluticasone propionate (FLONASE) 50 mcg/actuation nasal spray, SHAKE LIQUID AND USE 1 SPRAY(50 MCG) IN EACH NOSTRIL EVERY DAY, Disp: 16 g, Rfl: 11  furosemide (LASIX) 40 MG tablet, TAKE 1 TABLET(40 MG) BY MOUTH EVERY DAY, Disp: 90 tablet, Rfl: 3  semaglutide (OZEMPIC) 0.25 mg or 0.5 mg(2 mg/1.5 mL) pen injector, Start 0.25 mg weekly x 2 weeks then 0.5 mg weekly therafter, Disp: 4 pen, Rfl: 11    No current facility-administered medications for this visit.    Patient Active Problem List:     Severe obesity (BMI 35.0-35.9 with comorbidity)     Diabetes mellitus with neuropathy     Long term current use of anticoagulant therapy     Major depression, recurrent, chronic     GERD (gastroesophageal reflux disease)     Tobacco dependency     Chronic atrial fibrillation     Hypertension associated with diabetes     PVD (peripheral vascular disease)     Abdominal aortic atherosclerosis     Dependency on pain medication     Iron deficiency anemia     DDD (degenerative disc disease), lumbar     Type 2 diabetes mellitus with diabetic neuropathy, without long-term current use of insulin     Hyperlipidemia associated with type 2 diabetes mellitus     NSAID long-term use     Mixed restrictive and obstructive lung disease     Hypercapnic respiratory failure, chronic     COPD (chronic obstructive pulmonary disease)     Hepatomegaly     Chronic combined systolic and diastolic CHF (congestive heart failure)     Decubitus ulcer     Type 2 diabetes mellitus, without long-term current use of insulin     Retinal detachment of left eye with multiple breaks     Chronic pain syndrome     Other nonthrombocytopenic purpura     Encephalopathy, metabolic     Chronically on benzodiazepine therapy     Chronic prescription opiate use     Flank hernia     Iron deficiency anemia due to sideropenic dysphagia     Pressure ulcer of  other site, stage 3/resolved        Follow-up  Associated symptoms include arthralgias, coughing, fatigue, joint swelling, myalgias, a rash and a visual change. Pertinent negatives include no congestion, nausea or weakness.   Diabetes  She presents for her follow-up diabetic visit. She has type 2 diabetes mellitus. Onset time: several. Her disease course has been improving. Hypoglycemia symptoms include nervousness/anxiousness. Pertinent negatives for hypoglycemia include no confusion, mood changes, pallor or tremors. Associated symptoms include fatigue, foot paresthesias and visual change. Pertinent negatives for diabetes include no blurred vision, no polydipsia, no polyuria and no weakness. There are no hypoglycemic complications. Symptoms are improving. Diabetic complications include autonomic neuropathy, nephropathy, peripheral neuropathy and PVD. Risk factors for coronary artery disease include diabetes mellitus, hypertension, post-menopausal, sedentary lifestyle, stress and tobacco exposure. Current diabetic treatment includes oral agent (dual therapy) and insulin injections. She is compliant with treatment most of the time. Her home blood glucose trend is fluctuating minimally. Her breakfast blood glucose is taken between 9-10 am. Her breakfast blood glucose range is generally 130-140 mg/dl.     Review of Systems   Constitutional: Positive for fatigue. Negative for appetite change and unexpected weight change.   HENT: Negative for congestion, hearing loss and sinus pain.    Eyes: Positive for visual disturbance. Negative for blurred vision.   Respiratory: Positive for cough, chest tightness and shortness of breath. Negative for wheezing.    Cardiovascular: Negative for palpitations and leg swelling.   Gastrointestinal: Positive for abdominal distention. Negative for nausea.   Endocrine: Negative for polydipsia and polyuria.   Genitourinary: Positive for urgency. Negative for frequency.   Musculoskeletal:  Positive for arthralgias, back pain, gait problem, joint swelling, myalgias and neck stiffness.   Skin: Positive for rash. Negative for pallor.   Allergic/Immunologic: Negative for environmental allergies.   Neurological: Negative for tremors, weakness and light-headedness.   Psychiatric/Behavioral: Positive for behavioral problems. Negative for confusion and decreased concentration. The patient is nervous/anxious.        Patient Active Problem List   Diagnosis    Severe obesity (BMI 35.0-35.9 with comorbidity)    Diabetes mellitus with neuropathy    Long term current use of anticoagulant therapy    Chronic obstructive pulmonary disease with acute exacerbation    Major depression, recurrent, chronic    GERD (gastroesophageal reflux disease)    Tobacco dependency    Chronic atrial fibrillation    Hypertension associated with diabetes    PVD (peripheral vascular disease)    Abdominal aortic atherosclerosis    Dependency on pain medication    Iron deficiency anemia    DDD (degenerative disc disease), lumbar    Type 2 diabetes mellitus with diabetic neuropathy, without long-term current use of insulin    Hyperlipidemia associated with type 2 diabetes mellitus    NSAID long-term use    Mixed restrictive and obstructive lung disease    Hypercapnic respiratory failure, chronic    Acute on chronic diastolic congestive heart failure    COPD (chronic obstructive pulmonary disease)    Acute on chronic respiratory failure with hypoxia    Chest pain    Hepatomegaly    Chronic combined systolic and diastolic CHF (congestive heart failure)    Decubitus ulcer    Type 2 diabetes mellitus, without long-term current use of insulin    Retinal detachment of left eye with multiple breaks    Chronic pain syndrome    Other nonthrombocytopenic purpura    Encephalopathy, metabolic    Chronically on benzodiazepine therapy    Chronic prescription opiate use    Flank hernia    Iron deficiency anemia due to  sideropenic dysphagia    Pressure ulcer of other site, stage 3       Objective:      Physical Exam  Vitals and nursing note reviewed.   Constitutional:       Appearance: She is well-developed. She is obese. She is ill-appearing.   HENT:      Right Ear: Decreased hearing noted.      Left Ear: Decreased hearing noted.   Cardiovascular:      Rate and Rhythm: Normal rate and regular rhythm.      Pulses:           Dorsalis pedis pulses are 3+ on the right side and 3+ on the left side.        Posterior tibial pulses are 3+ on the right side and 3+ on the left side.      Heart sounds: Normal heart sounds.   Pulmonary:      Effort: Pulmonary effort is normal.      Breath sounds: Normal breath sounds.   Abdominal:      General: Abdomen is protuberant. Bowel sounds are normal.      Palpations: There is hepatomegaly and mass.      Tenderness: There is generalized abdominal tenderness.      Hernia: A hernia is present. Hernia is present in the ventral area.          Comments: Huge hernia with external bowel sounds of small bowels and large bowels.   Feet:      Right foot:      Protective Sensation: 6 sites tested. 6 sites sensed.      Skin integrity: No ulcer, blister or skin breakdown.      Left foot:      Protective Sensation: 6 sites tested. 6 sites sensed.      Skin integrity: No ulcer, blister or skin breakdown.   Skin:     General: Skin is warm and dry.   Neurological:      Mental Status: She is alert and oriented to person, place, and time.   Psychiatric:         Attention and Perception: Attention normal.         Mood and Affect: Mood normal. Affect is labile.         Speech: Speech normal.         Cognition and Memory: Cognition and memory normal.         Judgment: Judgment normal.         Lab Results   Component Value Date    WBC 9.65 03/28/2022    HGB 11.9 (L) 03/28/2022    HCT 39.3 03/28/2022     03/28/2022    CHOL 89 (L) 03/25/2022    TRIG 52 03/25/2022    HDL 37 (L) 03/25/2022    ALT 21 03/28/2022    AST  23 03/28/2022     03/28/2022    K 4.1 03/28/2022    CL 98 03/28/2022    CREATININE 0.6 03/28/2022    BUN 18 03/28/2022    CO2 32 (H) 03/28/2022    TSH 1.309 01/11/2021    INR 1.0 06/27/2019    HGBA1C 5.1 03/09/2022     The ASCVD Risk score (Rellvidhya GUERRERO Jr., et al., 2013) failed to calculate for the following reasons:    The valid total cholesterol range is 130 to 320 mg/dL    Assessment:       1. Pneumococcal vaccination indicated    2. Lumbar degenerative disc disease    3. Hypertension associated with diabetes    4. Diabetic neurogenic arthropathy    5. Chronic obstructive pulmonary disease with lower respiratory infection    6. Other screening mammogram    7. Need for pneumococcal vaccination    8. Osteopenia after menopause    9. Colon cancer screening    10. RAUDEL (generalized anxiety disorder)    11. Severe obesity (BMI 35.0-35.9 with comorbidity)    12. Type 2 diabetes mellitus with diabetic peripheral angiopathy without gangrene, without long-term current use of insulin    13. Flank hernia    14. Chronic combined systolic and diastolic CHF (congestive heart failure)    15. Chronic pain syndrome    16. Encephalopathy, metabolic    17. Major depression, recurrent, chronic    18. Abdominal aortic atherosclerosis    19. Tobacco dependency    20. Tobacco dependence        Plan:       Pneumococcal vaccination indicated  -     (In Office Administered) Pneumococcal Polysaccharide Vaccine (23 Valent) (SQ/IM)    Lumbar degenerative disc disease  -     LIDOcaine (LIDODERM) 5 %; REMOVE AND DISCARD PATCH WITHIN 12 HOURS OR AS DIRECTED BY MD  Dispense: 30 patch; Refill: 3  -     clonazePAM (KLONOPIN) 0.5 MG tablet; Take 1 tablet (0.5 mg total) by mouth every evening. Insomnia  Dispense: 30 tablet; Refill: 1  -     HYDROcodone-acetaminophen (NORCO) 7.5-325 mg per tablet; Take 1 tablet by mouth every 12 (twelve) hours as needed for Pain. Medical necessary for greater than 7 days for chronic pain. Hydrocodone is  contraindicated with Klonopin.  Dispense: 60 tablet; Refill: 0  -     HYDROcodone-acetaminophen (NORCO) 7.5-325 mg per tablet; Take 1 tablet by mouth every 12 (twelve) hours as needed for Pain. Medical necessary for greater than 7 days for chronic pain. Hydrocodone is contraindicated with Klonopin.  Dispense: 60 tablet; Refill: 0    Hypertension associated with diabetes    Diabetic neurogenic arthropathy  -     Basic Metabolic Panel; Future; Expected date: 04/05/2022  -     Hemoglobin A1C; Future; Expected date: 04/05/2022  -     dulaglutide (TRULICITY) 0.75 mg/0.5 mL pen injector; Inject 0.75 mg into the skin every 7 days.  Dispense: 4 pen; Refill: 11    Chronic obstructive pulmonary disease with lower respiratory infection    Other screening mammogram  -     Mammo Digital Screening Bilat w/ Artemio; Future; Expected date: 04/05/2022    Need for pneumococcal vaccination  -     (In Office Administered) Pneumococcal Polysaccharide Vaccine (23 Valent) (SQ/IM)    Osteopenia after menopause  -     DXA Bone Density Spine And Hip; Future; Expected date: 04/05/2022    Colon cancer screening  -     Ambulatory referral/consult to Gastroenterology; Future; Expected date: 04/12/2022    RAUDEL (generalized anxiety disorder)  -     busPIRone (BUSPAR) 5 MG Tab; Take 1 tablet (5 mg total) by mouth 2 (two) times daily.  Dispense: 60 tablet; Refill: 11  -     clonazePAM (KLONOPIN) 0.5 MG tablet; Take 1 tablet (0.5 mg total) by mouth every evening. Insomnia  Dispense: 30 tablet; Refill: 1    Severe obesity (BMI 35.0-35.9 with comorbidity)    Type 2 diabetes mellitus with diabetic peripheral angiopathy without gangrene, without long-term current use of insulin    Flank hernia    Chronic combined systolic and diastolic CHF (congestive heart failure)    Chronic pain syndrome    Encephalopathy, metabolic    Major depression, recurrent, chronic    Abdominal aortic atherosclerosis    Tobacco dependency    Tobacco dependence  -     Ambulatory  referral/consult to Smoking Cessation Program; Future; Expected date: 04/26/2022    Other orders  -     potassium chloride SA (K-DUR,KLOR-CON) 20 MEQ tablet; Take 1 tablet (20 mEq total) by mouth once daily.  Dispense: 30 tablet; Refill: 11  -     nystatin-triamcinolone (MYCOLOG II) cream; Apply topically 3 (three) times daily. to affected area  Dispense: 30 g; Refill: 3      Lab Results   Component Value Date    HGBA1C 5.1 03/09/2022     DM.The current medical regimen is effective;  continue present plan and medications.  Ht.Stable and controlled. Continue current treatment plan as previously prescribed  Patient readiness: acceptance and barriers:readiness, social stressors and environmental  I have checked the patient's  prior to prescribing opioids.  I have recommended that this patient have a immunization for Covid and smoking cessation but she declines at this time. I have discussed the risks and benefits of this procedure with her. The patient verbalizes understanding.  She will use Ozempic due to Heart and weight loss indications.  35-minute visit. 20 minutes spent counseling patient on diet, exercise, and weight loss.  This has been fully explained to the patient, who indicates understanding.  Review plate method  Review foods in home  Discuss food labels  Refer to exercise program  Assist with medical visit preparation  Review patient results  Review patient education about results  Review chronic disease interventions (see specific disease intervention)  Assess knowledge level  Provide and discuss information/education material  Encourage communication with physician  Monitor compliance  Educate on risk of non-compliance  Give food and resource list  Review patient education material (see patient instructions)  Review patient education material (see patient instructions)  Refer to exercise program  Review stress management techniques  Review patient education material (see patient instructions)  Assess  support system  Discuss stress management techniques  Review normal ranges for labs    Discussed health maintenance guidelines appropriate for age.  Discussed health maintenance guidelines appropriate for age.  Discussed health maintenance guidelines appropriate for age.

## 2022-04-25 ENCOUNTER — PATIENT MESSAGE (OUTPATIENT)
Dept: FAMILY MEDICINE | Facility: CLINIC | Age: 72
End: 2022-04-25
Payer: MEDICARE

## 2022-04-29 ENCOUNTER — HOSPITAL ENCOUNTER (OUTPATIENT)
Dept: RADIOLOGY | Facility: CLINIC | Age: 72
Discharge: HOME | End: 2022-04-29
Attending: NURSE PRACTITIONER
Payer: MEDICARE

## 2022-04-29 ENCOUNTER — OFFICE VISIT (OUTPATIENT)
Dept: FAMILY MEDICINE | Facility: CLINIC | Age: 72
End: 2022-04-29
Payer: MEDICARE

## 2022-04-29 VITALS
RESPIRATION RATE: 14 BRPM | TEMPERATURE: 98 F | HEIGHT: 68 IN | WEIGHT: 229.94 LBS | DIASTOLIC BLOOD PRESSURE: 76 MMHG | BODY MASS INDEX: 34.85 KG/M2 | HEART RATE: 73 BPM | OXYGEN SATURATION: 96 % | SYSTOLIC BLOOD PRESSURE: 138 MMHG

## 2022-04-29 DIAGNOSIS — R06.02 SOB (SHORTNESS OF BREATH): ICD-10-CM

## 2022-04-29 DIAGNOSIS — R06.09 DOE (DYSPNEA ON EXERTION): ICD-10-CM

## 2022-04-29 DIAGNOSIS — R05.9 COUGH: Primary | ICD-10-CM

## 2022-04-29 DIAGNOSIS — R05.9 COUGH: ICD-10-CM

## 2022-04-29 PROCEDURE — 3060F POS MICROALBUMINURIA REV: CPT | Mod: CPTII,S$GLB,, | Performed by: NURSE PRACTITIONER

## 2022-04-29 PROCEDURE — 3075F PR MOST RECENT SYSTOLIC BLOOD PRESS GE 130-139MM HG: ICD-10-PCS | Mod: CPTII,S$GLB,, | Performed by: NURSE PRACTITIONER

## 2022-04-29 PROCEDURE — 3078F PR MOST RECENT DIASTOLIC BLOOD PRESSURE < 80 MM HG: ICD-10-PCS | Mod: CPTII,S$GLB,, | Performed by: NURSE PRACTITIONER

## 2022-04-29 PROCEDURE — 3066F NEPHROPATHY DOC TX: CPT | Mod: CPTII,S$GLB,, | Performed by: NURSE PRACTITIONER

## 2022-04-29 PROCEDURE — 96372 THER/PROPH/DIAG INJ SC/IM: CPT | Mod: S$GLB,,, | Performed by: NURSE PRACTITIONER

## 2022-04-29 PROCEDURE — 1159F PR MEDICATION LIST DOCUMENTED IN MEDICAL RECORD: ICD-10-PCS | Mod: CPTII,S$GLB,, | Performed by: NURSE PRACTITIONER

## 2022-04-29 PROCEDURE — 1101F PR PT FALLS ASSESS DOC 0-1 FALLS W/OUT INJ PAST YR: ICD-10-PCS | Mod: CPTII,S$GLB,, | Performed by: NURSE PRACTITIONER

## 2022-04-29 PROCEDURE — 3044F HG A1C LEVEL LT 7.0%: CPT | Mod: CPTII,S$GLB,, | Performed by: NURSE PRACTITIONER

## 2022-04-29 PROCEDURE — 3060F PR POS MICROALBUMINURIA RESULT DOCUMENTED/REVIEW: ICD-10-PCS | Mod: CPTII,S$GLB,, | Performed by: NURSE PRACTITIONER

## 2022-04-29 PROCEDURE — 71046 X-RAY EXAM CHEST 2 VIEWS: CPT | Mod: 26,,, | Performed by: RADIOLOGY

## 2022-04-29 PROCEDURE — 1157F ADVNC CARE PLAN IN RCRD: CPT | Mod: CPTII,S$GLB,, | Performed by: NURSE PRACTITIONER

## 2022-04-29 PROCEDURE — 3066F PR DOCUMENTATION OF TREATMENT FOR NEPHROPATHY: ICD-10-PCS | Mod: CPTII,S$GLB,, | Performed by: NURSE PRACTITIONER

## 2022-04-29 PROCEDURE — 3008F PR BODY MASS INDEX (BMI) DOCUMENTED: ICD-10-PCS | Mod: CPTII,S$GLB,, | Performed by: NURSE PRACTITIONER

## 2022-04-29 PROCEDURE — 71046 XR CHEST PA AND LATERAL: ICD-10-PCS | Mod: 26,,, | Performed by: RADIOLOGY

## 2022-04-29 PROCEDURE — 99999 PR PBB SHADOW E&M-EST. PATIENT-LVL V: ICD-10-PCS | Mod: PBBFAC,,, | Performed by: NURSE PRACTITIONER

## 2022-04-29 PROCEDURE — 99999 PR PBB SHADOW E&M-EST. PATIENT-LVL V: CPT | Mod: PBBFAC,,, | Performed by: NURSE PRACTITIONER

## 2022-04-29 PROCEDURE — 1101F PT FALLS ASSESS-DOCD LE1/YR: CPT | Mod: CPTII,S$GLB,, | Performed by: NURSE PRACTITIONER

## 2022-04-29 PROCEDURE — 99215 PR OFFICE/OUTPT VISIT, EST, LEVL V, 40-54 MIN: ICD-10-PCS | Mod: 25,S$GLB,, | Performed by: NURSE PRACTITIONER

## 2022-04-29 PROCEDURE — 3078F DIAST BP <80 MM HG: CPT | Mod: CPTII,S$GLB,, | Performed by: NURSE PRACTITIONER

## 2022-04-29 PROCEDURE — 3288F FALL RISK ASSESSMENT DOCD: CPT | Mod: CPTII,S$GLB,, | Performed by: NURSE PRACTITIONER

## 2022-04-29 PROCEDURE — 1159F MED LIST DOCD IN RCRD: CPT | Mod: CPTII,S$GLB,, | Performed by: NURSE PRACTITIONER

## 2022-04-29 PROCEDURE — 4010F PR ACE/ARB THEARPY RXD/TAKEN: ICD-10-PCS | Mod: CPTII,S$GLB,, | Performed by: NURSE PRACTITIONER

## 2022-04-29 PROCEDURE — 3075F SYST BP GE 130 - 139MM HG: CPT | Mod: CPTII,S$GLB,, | Performed by: NURSE PRACTITIONER

## 2022-04-29 PROCEDURE — 71046 X-RAY EXAM CHEST 2 VIEWS: CPT | Mod: TC,FY,PO

## 2022-04-29 PROCEDURE — 1157F PR ADVANCE CARE PLAN OR EQUIV PRESENT IN MEDICAL RECORD: ICD-10-PCS | Mod: CPTII,S$GLB,, | Performed by: NURSE PRACTITIONER

## 2022-04-29 PROCEDURE — 4010F ACE/ARB THERAPY RXD/TAKEN: CPT | Mod: CPTII,S$GLB,, | Performed by: NURSE PRACTITIONER

## 2022-04-29 PROCEDURE — 3288F PR FALLS RISK ASSESSMENT DOCUMENTED: ICD-10-PCS | Mod: CPTII,S$GLB,, | Performed by: NURSE PRACTITIONER

## 2022-04-29 PROCEDURE — 1125F PR PAIN SEVERITY QUANTIFIED, PAIN PRESENT: ICD-10-PCS | Mod: CPTII,S$GLB,, | Performed by: NURSE PRACTITIONER

## 2022-04-29 PROCEDURE — 1125F AMNT PAIN NOTED PAIN PRSNT: CPT | Mod: CPTII,S$GLB,, | Performed by: NURSE PRACTITIONER

## 2022-04-29 PROCEDURE — 96372 PR INJECTION,THERAP/PROPH/DIAG2ST, IM OR SUBCUT: ICD-10-PCS | Mod: S$GLB,,, | Performed by: NURSE PRACTITIONER

## 2022-04-29 PROCEDURE — 99215 OFFICE O/P EST HI 40 MIN: CPT | Mod: 25,S$GLB,, | Performed by: NURSE PRACTITIONER

## 2022-04-29 PROCEDURE — 3044F PR MOST RECENT HEMOGLOBIN A1C LEVEL <7.0%: ICD-10-PCS | Mod: CPTII,S$GLB,, | Performed by: NURSE PRACTITIONER

## 2022-04-29 PROCEDURE — 3008F BODY MASS INDEX DOCD: CPT | Mod: CPTII,S$GLB,, | Performed by: NURSE PRACTITIONER

## 2022-04-29 RX ORDER — CEFDINIR 300 MG/1
300 CAPSULE ORAL 2 TIMES DAILY
Qty: 20 CAPSULE | Refills: 0 | Status: SHIPPED | OUTPATIENT
Start: 2022-04-29 | End: 2022-05-09

## 2022-04-29 RX ORDER — CEFTRIAXONE 500 MG/1
500 INJECTION, POWDER, FOR SOLUTION INTRAMUSCULAR; INTRAVENOUS
Status: COMPLETED | OUTPATIENT
Start: 2022-04-29 | End: 2022-04-29

## 2022-04-29 RX ORDER — FUROSEMIDE 40 MG/1
40 TABLET ORAL 2 TIMES DAILY
Qty: 10 TABLET | Refills: 0 | Status: SHIPPED | OUTPATIENT
Start: 2022-04-29 | End: 2022-05-04

## 2022-04-29 RX ADMIN — CEFTRIAXONE 500 MG: 500 INJECTION, POWDER, FOR SOLUTION INTRAMUSCULAR; INTRAVENOUS at 10:04

## 2022-04-29 NOTE — PROGRESS NOTES
This dictation has been generated using Modal Fluency Dictation some phonetic errors may occur. Please contact author for clarification if needed.     Problem List Items Addressed This Visit    None     Visit Diagnoses     Cough    -  Primary    Relevant Orders    X-Ray Chest PA And Lateral (Completed)    BNP    CBC Auto Differential    Comprehensive Metabolic Panel    SOB (shortness of breath)        Relevant Orders    BNP    JAMES (dyspnea on exertion)        Relevant Orders    BNP          Orders Placed This Encounter    X-Ray Chest PA And Lateral    BNP    CBC Auto Differential    Comprehensive Metabolic Panel    cefTRIAXone injection 500 mg    cefdinir (OMNICEF) 300 MG capsule     Cough. Check cxr, bnp, cbc, cmp evaluate for pneumonia, heart failure, infectious etiology and evaluate renal function and hepatic function.  Patient has CHF and COPD.  Empiric therapy for infectious etiology with Rocephin as above and Omnicef.  May consider addition of steroid.  I will review all results and address accordingly  CURB 65 score 1      No follow-ups on file.    ________________________________________________________________  ________________________________________________________________      Chief Complaint   Patient presents with    Cough    Nasal Congestion     History of present illness  This 71 y.o. presents today for complaint of cough and congestion.  Symptoms present for 2 weeks.  Patient has had runny nose.  She notes increased use of nebulizers up to 6 times per day.  Continues her maintenance inhaler.  She has been using her home oxygen at 3 L per nasal cannula but notes that today she was able to turn it down to 2. Patient notes cough productive of green sputum.  She has had shortness of breath and increased from baseline.  Routinely sleeps in a recliner no changes there.  Denies any chest pain.  No nausea vomiting diarrhea.  Appetite is good.  Denies confusion.  Denies weakness.  Limited ros  neg      Past Medical History:   Diagnosis Date    *Atrial flutter     Angina pectoris 2017    Anxiety     Arthritis     Asthma     Atrial fibrillation     Back pain     Cataract     OD    CHF (congestive heart failure)     Chronically on benzodiazepine therapy 2019    COPD (chronic obstructive pulmonary disease)     Depression     Diabetes mellitus     Emphysema of lung     Heart failure     Hepatomegaly 2/3/2016    Hernia     History of MI (myocardial infarction) 2016    Hypercapnic respiratory failure, chronic 2016    Hyperlipidemia     Hypertension     Iron deficiency anemia 2/3/2016    Myocardial infarction     Obesity     Peripheral vascular disease     Pneumonia     Polyneuropathy     Retinal detachment     OS    Septic shock 2017    Skin ulcer 3/18/2017    Tobacco dependence     Type II 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, not stated as uncontrolled(250.60)        Past Surgical History:   Procedure Laterality Date    BREAST BIOPSY Left 10 yrs ago    benign    CATARACT EXTRACTION Left     OS      SECTION      x2    EYE SURGERY      HYSTERECTOMY      partial    OOPHORECTOMY      one ovary conserved    RETINAL DETACHMENT SURGERY      buckle --OS    TONSILLECTOMY         Family History   Problem Relation Age of Onset    Arthritis Father     Heart disease Father     Early death Sister 30        heart disease    Heart disease Sister 32        MI    Cancer Brother         Lung cancer    No Known Problems Daughter     Blindness Son         due to accident//    Arthritis Sister     Heart disease Sister     Crohn's disease Sister     Alcohol abuse Mother     Breast cancer Neg Hx     Glaucoma Neg Hx     Macular degeneration Neg Hx     Retinal detachment Neg Hx     Amblyopia Neg Hx     Diabetes Neg Hx     Cataracts Neg Hx     Hypertension Neg Hx     Strabismus Neg Hx     Stroke Neg Hx      Thyroid disease Neg Hx        Social History     Socioeconomic History    Marital status:    Tobacco Use    Smoking status: Current Every Day Smoker     Packs/day: 0.30     Years: 50.00     Pack years: 15.00     Types: Cigarettes     Start date: 1968     Last attempt to quit: 2019     Years since quittin.7    Smokeless tobacco: Never Used   Substance and Sexual Activity    Alcohol use: No     Alcohol/week: 0.0 standard drinks    Drug use: No    Sexual activity: Not Currently     Social Determinants of Health     Financial Resource Strain: Medium Risk    Difficulty of Paying Living Expenses: Somewhat hard   Food Insecurity: Food Insecurity Present    Worried About Running Out of Food in the Last Year: Sometimes true       Current Outpatient Medications   Medication Sig Dispense Refill    apixaban (ELIQUIS) 5 mg Tab Take 1 tablet (5 mg total) by mouth 2 (two) times daily. 180 tablet 3    atorvastatin (LIPITOR) 10 MG tablet Take 1 tablet (10 mg total) by mouth every other day. 35 tablet 3    busPIRone (BUSPAR) 5 MG Tab Take 1 tablet (5 mg total) by mouth 2 (two) times daily. 60 tablet 11    clonazePAM (KLONOPIN) 0.5 MG tablet Take 1 tablet (0.5 mg total) by mouth every evening. Insomnia 30 tablet 1    dulaglutide (TRULICITY) 0.75 mg/0.5 mL pen injector Inject 0.75 mg into the skin every 7 days. 4 pen 11    fluticasone propionate (FLONASE) 50 mcg/actuation nasal spray SHAKE LIQUID AND USE 1 SPRAY(50 MCG) IN EACH NOSTRIL EVERY DAY 48 g 2    fluticasone-umeclidin-vilanter (TRELEGY ELLIPTA) 100-62.5-25 mcg DsDv INHALE 1 PUFF INTO THE LUNGS ONE TIME DAILY 180 each 3    furosemide (LASIX) 40 MG tablet TAKE 1 TABLET(40 MG) BY MOUTH EVERY DAY 90 tablet 3    gabapentin (NEURONTIN) 600 MG tablet Take 1 tablet (600 mg total) by mouth 3 (three) times daily. 270 tablet 3    [START ON 2022] HYDROcodone-acetaminophen (NORCO) 7.5-325 mg per tablet Take 1 tablet by mouth every 12 (twelve) hours  as needed for Pain. Medical necessary for greater than 7 days for chronic pain. Hydrocodone is contraindicated with Klonopin. 60 tablet 0    [START ON 6/1/2022] HYDROcodone-acetaminophen (NORCO) 7.5-325 mg per tablet Take 1 tablet by mouth every 12 (twelve) hours as needed for Pain. Medical necessary for greater than 7 days for chronic pain. Hydrocodone is contraindicated with Klonopin. 60 tablet 0    levalbuterol (XOPENEX) 0.63 mg/3 mL nebulizer solution USE 1 VIAL VIA NEBULIZER FOUR TIMES DAILY 750 mL 10    LIDOcaine (LIDODERM) 5 % REMOVE AND DISCARD PATCH WITHIN 12 HOURS OR AS DIRECTED BY MD 30 patch 3    lisinopriL (PRINIVIL,ZESTRIL) 20 MG tablet TAKE 1 TABLET TWICE DAILY 180 tablet 4    metFORMIN (GLUCOPHAGE) 500 MG tablet TAKE 1 TABLET (500 MG TOTAL) BY MOUTH DAILY WITH BREAKFAST. 90 tablet 3    multivitamin (THERAGRAN) tablet Take 1 tablet by mouth once daily.      nystatin-triamcinolone (MYCOLOG II) cream Apply topically 3 (three) times daily. to affected area 30 g 3    NYSTOP powder APPLY TOPICALLY TWICE DAILY 120 g 11    ondansetron (ZOFRAN-ODT) 8 MG TbDL DISSOLVE 1 TABLET ON THE TONGUE EVERY 8 HOURS AS NEEDED 40 tablet 3    potassium chloride SA (K-DUR,KLOR-CON) 20 MEQ tablet Take 1 tablet (20 mEq total) by mouth once daily. 30 tablet 11    ascorbic acid, vitamin C, (VITAMIN C) 500 MG tablet Take 2 tablets (1,000 mg total) by mouth every evening. (Patient not taking: No sig reported)      cefdinir (OMNICEF) 300 MG capsule Take 1 capsule (300 mg total) by mouth 2 (two) times daily. for 10 days 20 capsule 0     No current facility-administered medications for this visit.       Review of patient's allergies indicates:   Allergen Reactions    Dilaudid [hydromorphone] Anaphylaxis     Other reaction(s): Anaphylaxis  Other reaction(s): Unknown    Zyvox [linezolid in dextrose 5%] Shortness Of Breath       Physical examination  Vitals Reviewed\  Vitals:    04/29/22 0948   BP: 138/76   Pulse: 73    Resp: 14   Temp: 97.9 °F (36.6 °C)     Body mass index is 34.96 kg/m².   Weight: 104.3 kg (229 lb 15 oz)    Gen. Well-dressed well-nourished .  Patient looks sick not septic.  Skin warm dry and intact.  No rashes noted.  HEENT.  Masked   Neck is supple without adenopathy  Chest.  Respirations are even unlabored.  Scattered rhonchi crackles and wheezes noted during auscultation.  A little dull in the bases.  Cardiac regular rate and rhythm.  No chest wall adenopathy noted.  Neuro. Awake alert oriented x4.  Normal judgment and cognition noted.  Extremities no clubbing cyanosis or edema noted.     Call or return to clinic prn if these symptoms worsen or fail to improve as anticipated.

## 2022-05-03 ENCOUNTER — LAB VISIT (OUTPATIENT)
Dept: LAB | Facility: HOSPITAL | Age: 72
End: 2022-05-03
Attending: NURSE PRACTITIONER
Payer: MEDICARE

## 2022-05-03 DIAGNOSIS — R05.9 COUGH: ICD-10-CM

## 2022-05-03 DIAGNOSIS — R06.02 SOB (SHORTNESS OF BREATH): ICD-10-CM

## 2022-05-03 DIAGNOSIS — R06.09 DOE (DYSPNEA ON EXERTION): ICD-10-CM

## 2022-05-03 LAB
ANION GAP SERPL CALC-SCNC: 12 MMOL/L (ref 8–16)
BUN SERPL-MCNC: 15 MG/DL (ref 8–23)
CALCIUM SERPL-MCNC: 10.1 MG/DL (ref 8.7–10.5)
CHLORIDE SERPL-SCNC: 92 MMOL/L (ref 95–110)
CO2 SERPL-SCNC: 31 MMOL/L (ref 23–29)
CREAT SERPL-MCNC: 0.7 MG/DL (ref 0.5–1.4)
EST. GFR  (AFRICAN AMERICAN): >60 ML/MIN/1.73 M^2
EST. GFR  (NON AFRICAN AMERICAN): >60 ML/MIN/1.73 M^2
GLUCOSE SERPL-MCNC: 83 MG/DL (ref 70–110)
POTASSIUM SERPL-SCNC: 4.5 MMOL/L (ref 3.5–5.1)
SODIUM SERPL-SCNC: 135 MMOL/L (ref 136–145)

## 2022-05-03 PROCEDURE — 80048 BASIC METABOLIC PNL TOTAL CA: CPT | Performed by: NURSE PRACTITIONER

## 2022-05-03 PROCEDURE — 36415 COLL VENOUS BLD VENIPUNCTURE: CPT | Mod: PO | Performed by: NURSE PRACTITIONER

## 2022-05-04 DIAGNOSIS — K21.9 GASTROESOPHAGEAL REFLUX DISEASE WITHOUT ESOPHAGITIS: ICD-10-CM

## 2022-05-05 RX ORDER — SUCRALFATE 1 G/1
TABLET ORAL
Qty: 180 TABLET | Refills: 4 | OUTPATIENT
Start: 2022-05-05

## 2022-05-10 ENCOUNTER — TELEPHONE (OUTPATIENT)
Dept: FAMILY MEDICINE | Facility: CLINIC | Age: 72
End: 2022-05-10
Payer: MEDICARE

## 2022-05-10 RX ORDER — FUROSEMIDE 40 MG/1
40 TABLET ORAL 2 TIMES DAILY
Qty: 6 TABLET | Refills: 0 | Status: SHIPPED | OUTPATIENT
Start: 2022-05-10 | End: 2022-05-13

## 2022-05-10 NOTE — TELEPHONE ENCOUNTER
Called labs to patient.  Patient notes some mild improvement.  Still having some shortness of breath when she lays down.  Still coughing up clear fluid.  Taking Lasix 40 mg discussed with her increasing that to twice a day for 3 days and re-evaluation at that time.  Might consider steroids neck is depending on symptomatology.  BNP was elevated of labs.

## 2022-05-12 ENCOUNTER — PATIENT MESSAGE (OUTPATIENT)
Dept: SMOKING CESSATION | Facility: CLINIC | Age: 72
End: 2022-05-12
Payer: MEDICARE

## 2022-05-23 NOTE — PROGRESS NOTES
03/02/17-Received voice mail from patient that she does not know what dosage of coumadin that she should take today. Sent message to Dr. Bird's pod and was informed that the coumadin clinic would be calling patient. Katie Urias from the coumadin clinic called and updated patient.   
Kaitlin

## 2022-05-31 ENCOUNTER — PATIENT MESSAGE (OUTPATIENT)
Dept: ADMINISTRATIVE | Facility: HOSPITAL | Age: 72
End: 2022-05-31
Payer: MEDICARE

## 2022-06-06 ENCOUNTER — TELEPHONE (OUTPATIENT)
Dept: FAMILY MEDICINE | Facility: CLINIC | Age: 72
End: 2022-06-06
Payer: MEDICARE

## 2022-06-10 ENCOUNTER — LAB VISIT (OUTPATIENT)
Dept: LAB | Facility: HOSPITAL | Age: 72
End: 2022-06-10
Attending: FAMILY MEDICINE
Payer: MEDICARE

## 2022-06-10 DIAGNOSIS — Z12.11 ENCOUNTER FOR FIT (FECAL IMMUNOCHEMICAL TEST) SCREENING: ICD-10-CM

## 2022-06-10 PROCEDURE — 82274 ASSAY TEST FOR BLOOD FECAL: CPT | Performed by: FAMILY MEDICINE

## 2022-06-13 ENCOUNTER — PATIENT MESSAGE (OUTPATIENT)
Dept: FAMILY MEDICINE | Facility: CLINIC | Age: 72
End: 2022-06-13
Payer: MEDICARE

## 2022-06-13 DIAGNOSIS — M51.36 LUMBAR DEGENERATIVE DISC DISEASE: ICD-10-CM

## 2022-06-13 DIAGNOSIS — F41.1 GAD (GENERALIZED ANXIETY DISORDER): ICD-10-CM

## 2022-06-13 RX ORDER — CLONAZEPAM 0.5 MG/1
0.5 TABLET ORAL NIGHTLY
Qty: 30 TABLET | Refills: 1 | Status: CANCELLED | OUTPATIENT
Start: 2022-06-13

## 2022-06-13 NOTE — TELEPHONE ENCOUNTER
No new care gaps identified.  [facility] Embedded Care Gaps. Reference number: [id number]. 6/13/2022   6:48:18 AM SKIPT

## 2022-06-14 ENCOUNTER — PATIENT MESSAGE (OUTPATIENT)
Dept: FAMILY MEDICINE | Facility: CLINIC | Age: 72
End: 2022-06-14
Payer: MEDICARE

## 2022-06-21 DIAGNOSIS — Z12.11 COLON CANCER SCREENING: Primary | ICD-10-CM

## 2022-06-21 DIAGNOSIS — D50.0 IRON DEFICIENCY ANEMIA DUE TO CHRONIC BLOOD LOSS: ICD-10-CM

## 2022-06-21 LAB — HEMOCCULT STL QL IA: POSITIVE

## 2022-06-23 ENCOUNTER — TELEPHONE (OUTPATIENT)
Dept: FAMILY MEDICINE | Facility: CLINIC | Age: 72
End: 2022-06-23
Payer: MEDICARE

## 2022-06-23 NOTE — TELEPHONE ENCOUNTER
----- Message from Constantine Bird MD sent at 6/21/2022  6:18 PM CDT -----  Hemoccult-positive.  She needs a colonoscopy/ please ask her if she has any active hemorrhoids.

## 2022-06-24 ENCOUNTER — PATIENT MESSAGE (OUTPATIENT)
Dept: GASTROENTEROLOGY | Facility: CLINIC | Age: 72
End: 2022-06-24
Payer: MEDICARE

## 2022-06-27 ENCOUNTER — TELEPHONE (OUTPATIENT)
Dept: FAMILY MEDICINE | Facility: CLINIC | Age: 72
End: 2022-06-27
Payer: MEDICARE

## 2022-06-27 NOTE — TELEPHONE ENCOUNTER
----- Message from Miquel Rubin LPN sent at 6/24/2022  1:26 PM CDT -----  Regarding: clearance  Ms. Tian has a colonoscopy scheduled with Dr. More on 8/4/22. Please send clearance to hold Eliquis for 2 days?      If you have any questions or concerns, please don't hesitate to call.    Sincerely,  SUHAIL Lafleur

## 2022-07-11 RX ORDER — LISINOPRIL 20 MG/1
20 TABLET ORAL 2 TIMES DAILY
Qty: 180 TABLET | Refills: 3 | Status: SHIPPED | OUTPATIENT
Start: 2022-07-11 | End: 2022-07-13 | Stop reason: SDUPTHER

## 2022-07-11 NOTE — TELEPHONE ENCOUNTER
No new care gaps identified.  [facility] Embedded Care Gaps. Reference number: [id number]. 7/11/2022   1:15:40 PM CDT

## 2022-07-11 NOTE — TELEPHONE ENCOUNTER
No new care gaps identified.  [facility] Embedded Care Gaps. Reference number: [id number]. 7/11/2022   9:21:33 AM SKIPT

## 2022-07-12 RX ORDER — LISINOPRIL 20 MG/1
TABLET ORAL
Qty: 180 TABLET | Refills: 4 | OUTPATIENT
Start: 2022-07-12

## 2022-07-13 ENCOUNTER — PATIENT MESSAGE (OUTPATIENT)
Dept: FAMILY MEDICINE | Facility: CLINIC | Age: 72
End: 2022-07-13
Payer: MEDICARE

## 2022-07-22 ENCOUNTER — HOSPITAL ENCOUNTER (OUTPATIENT)
Dept: RADIOLOGY | Facility: CLINIC | Age: 72
Discharge: HOME | End: 2022-07-22
Attending: FAMILY MEDICINE
Payer: MEDICARE

## 2022-07-22 DIAGNOSIS — M85.80 OSTEOPENIA AFTER MENOPAUSE: ICD-10-CM

## 2022-07-22 DIAGNOSIS — Z12.31 OTHER SCREENING MAMMOGRAM: ICD-10-CM

## 2022-07-22 DIAGNOSIS — Z78.0 OSTEOPENIA AFTER MENOPAUSE: ICD-10-CM

## 2022-07-22 PROCEDURE — 77080 DXA BONE DENSITY AXIAL: CPT | Mod: TC,PO

## 2022-07-22 PROCEDURE — 77067 MAMMO DIGITAL SCREENING BILAT WITH TOMO: ICD-10-PCS | Mod: 26,,, | Performed by: RADIOLOGY

## 2022-07-22 PROCEDURE — 77067 SCR MAMMO BI INCL CAD: CPT | Mod: 26,,, | Performed by: RADIOLOGY

## 2022-07-22 PROCEDURE — 77063 BREAST TOMOSYNTHESIS BI: CPT | Mod: 26,,, | Performed by: RADIOLOGY

## 2022-07-22 PROCEDURE — 77080 DEXA BONE DENSITY SPINE HIP: ICD-10-PCS | Mod: 26,,, | Performed by: RADIOLOGY

## 2022-07-22 PROCEDURE — 77080 DXA BONE DENSITY AXIAL: CPT | Mod: 26,,, | Performed by: RADIOLOGY

## 2022-07-22 PROCEDURE — 77063 MAMMO DIGITAL SCREENING BILAT WITH TOMO: ICD-10-PCS | Mod: 26,,, | Performed by: RADIOLOGY

## 2022-07-22 PROCEDURE — 77067 SCR MAMMO BI INCL CAD: CPT | Mod: TC,PO

## 2022-07-22 PROCEDURE — 77063 BREAST TOMOSYNTHESIS BI: CPT | Mod: TC,PO

## 2022-07-26 ENCOUNTER — OFFICE VISIT (OUTPATIENT)
Dept: FAMILY MEDICINE | Facility: CLINIC | Age: 72
End: 2022-07-26
Payer: MEDICARE

## 2022-07-26 VITALS
DIASTOLIC BLOOD PRESSURE: 80 MMHG | HEIGHT: 68 IN | SYSTOLIC BLOOD PRESSURE: 122 MMHG | OXYGEN SATURATION: 95 % | TEMPERATURE: 98 F | BODY MASS INDEX: 35.61 KG/M2 | HEART RATE: 60 BPM | WEIGHT: 235 LBS

## 2022-07-26 DIAGNOSIS — I34.0 NONRHEUMATIC MITRAL VALVE STENOSIS WITH INSUFFICIENCY: Primary | ICD-10-CM

## 2022-07-26 DIAGNOSIS — I34.2 NONRHEUMATIC MITRAL VALVE STENOSIS WITH INSUFFICIENCY: Primary | ICD-10-CM

## 2022-07-26 DIAGNOSIS — E11.51 TYPE 2 DIABETES MELLITUS WITH DIABETIC PERIPHERAL ANGIOPATHY WITHOUT GANGRENE, WITHOUT LONG-TERM CURRENT USE OF INSULIN: ICD-10-CM

## 2022-07-26 DIAGNOSIS — B37.89 CANDIDIASIS OF BREAST: ICD-10-CM

## 2022-07-26 DIAGNOSIS — E11.59 HYPERTENSION ASSOCIATED WITH DIABETES: ICD-10-CM

## 2022-07-26 DIAGNOSIS — M51.36 LUMBAR DEGENERATIVE DISC DISEASE: ICD-10-CM

## 2022-07-26 DIAGNOSIS — F41.1 GAD (GENERALIZED ANXIETY DISORDER): ICD-10-CM

## 2022-07-26 DIAGNOSIS — I15.2 HYPERTENSION ASSOCIATED WITH DIABETES: ICD-10-CM

## 2022-07-26 DIAGNOSIS — Z51.81 THERAPEUTIC DRUG MONITORING: ICD-10-CM

## 2022-07-26 DIAGNOSIS — I50.30 DIASTOLIC CHF WITH PRESERVED LEFT VENTRICULAR FUNCTION, NYHA CLASS 2: ICD-10-CM

## 2022-07-26 DIAGNOSIS — G89.4 CHRONIC PAIN SYNDROME: ICD-10-CM

## 2022-07-26 PROCEDURE — 1126F AMNT PAIN NOTED NONE PRSNT: CPT | Mod: CPTII,S$GLB,, | Performed by: FAMILY MEDICINE

## 2022-07-26 PROCEDURE — 3008F PR BODY MASS INDEX (BMI) DOCUMENTED: ICD-10-PCS | Mod: CPTII,S$GLB,, | Performed by: FAMILY MEDICINE

## 2022-07-26 PROCEDURE — 4010F PR ACE/ARB THEARPY RXD/TAKEN: ICD-10-PCS | Mod: CPTII,S$GLB,, | Performed by: FAMILY MEDICINE

## 2022-07-26 PROCEDURE — 99214 PR OFFICE/OUTPT VISIT, EST, LEVL IV, 30-39 MIN: ICD-10-PCS | Mod: S$GLB,,, | Performed by: FAMILY MEDICINE

## 2022-07-26 PROCEDURE — 3288F PR FALLS RISK ASSESSMENT DOCUMENTED: ICD-10-PCS | Mod: CPTII,S$GLB,, | Performed by: FAMILY MEDICINE

## 2022-07-26 PROCEDURE — 3060F PR POS MICROALBUMINURIA RESULT DOCUMENTED/REVIEW: ICD-10-PCS | Mod: CPTII,S$GLB,, | Performed by: FAMILY MEDICINE

## 2022-07-26 PROCEDURE — 3060F POS MICROALBUMINURIA REV: CPT | Mod: CPTII,S$GLB,, | Performed by: FAMILY MEDICINE

## 2022-07-26 PROCEDURE — 1157F PR ADVANCE CARE PLAN OR EQUIV PRESENT IN MEDICAL RECORD: ICD-10-PCS | Mod: CPTII,S$GLB,, | Performed by: FAMILY MEDICINE

## 2022-07-26 PROCEDURE — 3044F HG A1C LEVEL LT 7.0%: CPT | Mod: CPTII,S$GLB,, | Performed by: FAMILY MEDICINE

## 2022-07-26 PROCEDURE — 3008F BODY MASS INDEX DOCD: CPT | Mod: CPTII,S$GLB,, | Performed by: FAMILY MEDICINE

## 2022-07-26 PROCEDURE — 3079F PR MOST RECENT DIASTOLIC BLOOD PRESSURE 80-89 MM HG: ICD-10-PCS | Mod: CPTII,S$GLB,, | Performed by: FAMILY MEDICINE

## 2022-07-26 PROCEDURE — 1159F PR MEDICATION LIST DOCUMENTED IN MEDICAL RECORD: ICD-10-PCS | Mod: CPTII,S$GLB,, | Performed by: FAMILY MEDICINE

## 2022-07-26 PROCEDURE — 3066F NEPHROPATHY DOC TX: CPT | Mod: CPTII,S$GLB,, | Performed by: FAMILY MEDICINE

## 2022-07-26 PROCEDURE — 99214 OFFICE O/P EST MOD 30 MIN: CPT | Mod: S$GLB,,, | Performed by: FAMILY MEDICINE

## 2022-07-26 PROCEDURE — 1160F PR REVIEW ALL MEDS BY PRESCRIBER/CLIN PHARMACIST DOCUMENTED: ICD-10-PCS | Mod: CPTII,S$GLB,, | Performed by: FAMILY MEDICINE

## 2022-07-26 PROCEDURE — 1126F PR PAIN SEVERITY QUANTIFIED, NO PAIN PRESENT: ICD-10-PCS | Mod: CPTII,S$GLB,, | Performed by: FAMILY MEDICINE

## 2022-07-26 PROCEDURE — 1160F RVW MEDS BY RX/DR IN RCRD: CPT | Mod: CPTII,S$GLB,, | Performed by: FAMILY MEDICINE

## 2022-07-26 PROCEDURE — 1157F ADVNC CARE PLAN IN RCRD: CPT | Mod: CPTII,S$GLB,, | Performed by: FAMILY MEDICINE

## 2022-07-26 PROCEDURE — 80326 AMPHETAMINES 5 OR MORE: CPT | Performed by: FAMILY MEDICINE

## 2022-07-26 PROCEDURE — 3079F DIAST BP 80-89 MM HG: CPT | Mod: CPTII,S$GLB,, | Performed by: FAMILY MEDICINE

## 2022-07-26 PROCEDURE — 3288F FALL RISK ASSESSMENT DOCD: CPT | Mod: CPTII,S$GLB,, | Performed by: FAMILY MEDICINE

## 2022-07-26 PROCEDURE — 3066F PR DOCUMENTATION OF TREATMENT FOR NEPHROPATHY: ICD-10-PCS | Mod: CPTII,S$GLB,, | Performed by: FAMILY MEDICINE

## 2022-07-26 PROCEDURE — 1101F PT FALLS ASSESS-DOCD LE1/YR: CPT | Mod: CPTII,S$GLB,, | Performed by: FAMILY MEDICINE

## 2022-07-26 PROCEDURE — 4010F ACE/ARB THERAPY RXD/TAKEN: CPT | Mod: CPTII,S$GLB,, | Performed by: FAMILY MEDICINE

## 2022-07-26 PROCEDURE — 1101F PR PT FALLS ASSESS DOC 0-1 FALLS W/OUT INJ PAST YR: ICD-10-PCS | Mod: CPTII,S$GLB,, | Performed by: FAMILY MEDICINE

## 2022-07-26 PROCEDURE — 3074F PR MOST RECENT SYSTOLIC BLOOD PRESSURE < 130 MM HG: ICD-10-PCS | Mod: CPTII,S$GLB,, | Performed by: FAMILY MEDICINE

## 2022-07-26 PROCEDURE — 99999 PR PBB SHADOW E&M-EST. PATIENT-LVL V: ICD-10-PCS | Mod: PBBFAC,,, | Performed by: FAMILY MEDICINE

## 2022-07-26 PROCEDURE — 1159F MED LIST DOCD IN RCRD: CPT | Mod: CPTII,S$GLB,, | Performed by: FAMILY MEDICINE

## 2022-07-26 PROCEDURE — 3074F SYST BP LT 130 MM HG: CPT | Mod: CPTII,S$GLB,, | Performed by: FAMILY MEDICINE

## 2022-07-26 PROCEDURE — 3044F PR MOST RECENT HEMOGLOBIN A1C LEVEL <7.0%: ICD-10-PCS | Mod: CPTII,S$GLB,, | Performed by: FAMILY MEDICINE

## 2022-07-26 PROCEDURE — 99999 PR PBB SHADOW E&M-EST. PATIENT-LVL V: CPT | Mod: PBBFAC,,, | Performed by: FAMILY MEDICINE

## 2022-07-26 RX ORDER — HYDROCODONE BITARTRATE AND ACETAMINOPHEN 7.5; 325 MG/1; MG/1
1 TABLET ORAL EVERY 8 HOURS PRN
Qty: 90 TABLET | Refills: 0 | Status: CANCELLED | OUTPATIENT
Start: 2022-07-26 | End: 2022-08-25

## 2022-07-26 RX ORDER — NYSTATIN 100000 [USP'U]/G
POWDER TOPICAL
Qty: 120 G | Refills: 11 | Status: SHIPPED | OUTPATIENT
Start: 2022-07-26 | End: 2023-07-04 | Stop reason: SDUPTHER

## 2022-07-26 RX ORDER — POTASSIUM CHLORIDE 20 MEQ/1
20 TABLET, EXTENDED RELEASE ORAL DAILY
Qty: 90 TABLET | Refills: 11 | Status: ON HOLD | OUTPATIENT
Start: 2022-07-26 | End: 2022-08-19 | Stop reason: SDUPTHER

## 2022-07-26 RX ORDER — GABAPENTIN 800 MG/1
800 TABLET ORAL 3 TIMES DAILY
Qty: 90 TABLET | Refills: 11 | Status: SHIPPED | OUTPATIENT
Start: 2022-07-26 | End: 2022-10-25 | Stop reason: SDUPTHER

## 2022-07-26 RX ORDER — NYSTATIN AND TRIAMCINOLONE ACETONIDE 100000; 1 [USP'U]/G; MG/G
CREAM TOPICAL 3 TIMES DAILY
Qty: 30 G | Refills: 3 | Status: SHIPPED | OUTPATIENT
Start: 2022-07-26 | End: 2022-10-25 | Stop reason: SDUPTHER

## 2022-07-26 RX ORDER — SPIRONOLACTONE 50 MG/1
50 TABLET, FILM COATED ORAL DAILY
Qty: 30 TABLET | Refills: 11 | Status: SHIPPED | OUTPATIENT
Start: 2022-07-26 | End: 2023-04-18

## 2022-07-26 RX ORDER — ATORVASTATIN CALCIUM 10 MG/1
10 TABLET, FILM COATED ORAL EVERY OTHER DAY
Qty: 35 TABLET | Refills: 3 | Status: SHIPPED | OUTPATIENT
Start: 2022-07-26 | End: 2022-10-11

## 2022-07-27 ENCOUNTER — PATIENT MESSAGE (OUTPATIENT)
Dept: FAMILY MEDICINE | Facility: CLINIC | Age: 72
End: 2022-07-27
Payer: MEDICARE

## 2022-07-28 ENCOUNTER — PATIENT MESSAGE (OUTPATIENT)
Dept: FAMILY MEDICINE | Facility: CLINIC | Age: 72
End: 2022-07-28
Payer: MEDICARE

## 2022-07-28 RX ORDER — HYDROCODONE BITARTRATE AND ACETAMINOPHEN 7.5; 325 MG/1; MG/1
1 TABLET ORAL EVERY 12 HOURS PRN
Qty: 60 TABLET | Refills: 0 | Status: SHIPPED | OUTPATIENT
Start: 2022-07-28 | End: 2022-08-26 | Stop reason: SDUPTHER

## 2022-07-28 RX ORDER — CLONAZEPAM 0.5 MG/1
0.5 TABLET ORAL NIGHTLY
Qty: 30 TABLET | Refills: 3 | Status: SHIPPED | OUTPATIENT
Start: 2022-07-28 | End: 2022-07-31

## 2022-07-28 NOTE — PROGRESS NOTES
Subjective:       Patient ID: Nelly Tian is a 71 y.o. female.    Chief Complaint: Follow-up    Active Medical History:  SUBJECTIVE: Nelly Tian is a 70 y.o. female seen for a follow up visit; she has diabetes, hypertension, hyperlipidemia, peripheral vascular disease and obesity.  Endocrine ROS: positive for - malaise/lethargy, polydipsia/polyuria and skin changes  negative for - breast changes, hot flashes, mood swings or temperature intolerance  Diabetic Review of Systems - medication compliance: compliant most of the time, diabetic diet compliance: noncompliant some of the time, home glucose monitoring: is performed sporadically, further diabetic ROS: no chest pain, dyspnea or TIA's, no numbness, tingling or pain in extremities, no hypoglycemia, no medication side effects noted, weight has increased, has dysesthesias in the feet, last eye exam approximately less than 1 yr ago.  The patient complains of cough productive of clear sputum, with shortness of breath, with shortness of breath during the cough, waxing and waning over time for months ..    Current Outpatient Medications:  albuterol (ACCUNEB) 0.63 mg/3 mL Nebu, Take 3 mLs (0.63 mg total) by nebulization every 6 (six) hours as needed. Rescue, Disp: 75 mL, Rfl: 11  albuterol (PROVENTIL/VENTOLIN HFA) 90 mcg/actuation inhaler, Inhale 2 puffs into the lungs every 6 (six) hours as needed for Wheezing. Rescue, Disp: 54 g, Rfl: 3  apixaban (ELIQUIS) 5 mg Tab, Take 1 tablet (5 mg total) by mouth 2 (two) times daily., Disp: 180 tablet, Rfl: 3  ascorbic acid, vitamin C, (VITAMIN C) 500 MG tablet, Take 2 tablets (1,000 mg total) by mouth every evening., Disp: , Rfl:   atorvastatin (LIPITOR) 10 MG tablet, Take 1 tablet (10 mg total) by mouth every other day., Disp: 35 tablet, Rfl: 3  BREO ELLIPTA 100-25 mcg/dose diskus inhaler, INHALE 1 PUFF EVERY DAY, Disp: 60 each, Rfl: 1  budesonide (PULMICORT) 0.5 mg/2 mL nebulizer solution, INHALE THE CONTENTS  OF 1 VIAL VIA NEBULIZER EVERY DAY, Disp: 180 mL, Rfl: 0  clonazePAM (KLONOPIN) 0.5 MG tablet, Take 1 tablet (0.5 mg total) by mouth 2 (two) times daily as needed for Anxiety., Disp: 60 tablet, Rfl: 2  gabapentin (NEURONTIN) 600 MG tablet, Take 1 tablet (600 mg total) by mouth 3 (three) times daily., Disp: 270 tablet, Rfl: 3  HYDROcodone-acetaminophen (NORCO) 7.5-325 mg per tablet, Take 1 tablet by mouth nightly as needed for Pain. Medical necessary for greater than 7 days for chronic pain. Hydrocodone is contraindicated with Klonopin., Disp: 60 tablet, Rfl: 0  (START ON 11/6/2021) HYDROcodone-acetaminophen (NORCO) 7.5-325 mg per tablet, Take 1 tablet by mouth every 12 (twelve) hours as needed for Pain. Medical necessary for greater than 7 days for chronic pain. Hydrocodone is contraindicated with Klonopin., Disp: 60 tablet, Rfl: 0  levalbuterol (XOPENEX) 0.63 mg/3 mL nebulizer solution, INHALE THE CONTENTS OF 1 VIAL BY NEBULIZATION 4 times  DAILY.    DX: J43.9, Disp: 792 mL, Rfl: 3  LIDOcaine (LIDODERM) 5 %, Remove & Discard patch within 12 hours or as directed by MD, Disp: 60 patch, Rfl: 3  lisinopriL (PRINIVIL,ZESTRIL) 20 MG tablet, TAKE 1 TABLET TWICE DAILY, Disp: 180 tablet, Rfl: 4  metFORMIN (GLUCOPHAGE) 500 MG tablet, TAKE 1 TABLET (500 MG TOTAL) BY MOUTH DAILY WITH BREAKFAST., Disp: 90 tablet, Rfl: 3  multivitamin (THERAGRAN) tablet, Take 1 tablet by mouth once daily., Disp: , Rfl:   nystatin (NYSTOP) powder, Apply topically 2 (two) times daily., Disp: 120 g, Rfl: 11  nystatin-triamcinolone (MYCOLOG II) cream, Apply topically 3 (three) times daily. to affected area, Disp: 30 g, Rfl: 3  omeprazole (PRILOSEC) 20 MG capsule, TAKE 1 CAPSULE TWICE DAILY  BEFORE  MEALS, Disp: 180 capsule, Rfl: 3  ondansetron (ZOFRAN-ODT) 8 MG TbDL, DISSOLVE 1 TABLET ON THE TONGUE EVERY 8 HOURS AS NEEDED, Disp: 40 tablet, Rfl: 1  potassium chloride SA (K-DUR,KLOR-CON) 20 MEQ tablet, TAKE 1 TABLET(20 MEQ) BY MOUTH EVERY DAY, Disp: 30  tablet, Rfl: 11  TRELEGY ELLIPTA 100-62.5-25 mcg DsDv, INHALE 1 PUFF INTO THE LUNGS ONE TIME DAILY, Disp: 180 each, Rfl: 3  fluticasone propionate (FLONASE) 50 mcg/actuation nasal spray, SHAKE LIQUID AND USE 1 SPRAY(50 MCG) IN EACH NOSTRIL EVERY DAY, Disp: 16 g, Rfl: 11  furosemide (LASIX) 40 MG tablet, TAKE 1 TABLET(40 MG) BY MOUTH EVERY DAY, Disp: 90 tablet, Rfl: 3  semaglutide (OZEMPIC) 0.25 mg or 0.5 mg(2 mg/1.5 mL) pen injector, Start 0.25 mg weekly x 2 weeks then 0.5 mg weekly therafter, Disp: 4 pen, Rfl: 11    No current facility-administered medications for this visit.    Patient Active Problem List:     Severe obesity (BMI 35.0-35.9 with comorbidity)     Diabetes mellitus with neuropathy     Long term current use of anticoagulant therapy     Major depression, recurrent, chronic     GERD (gastroesophageal reflux disease)     Tobacco dependency     Chronic atrial fibrillation     Hypertension associated with diabetes     PVD (peripheral vascular disease)     Abdominal aortic atherosclerosis     Dependency on pain medication     Iron deficiency anemia     DDD (degenerative disc disease), lumbar     Type 2 diabetes mellitus with diabetic neuropathy, without long-term current use of insulin     Hyperlipidemia associated with type 2 diabetes mellitus     NSAID long-term use     Mixed restrictive and obstructive lung disease     Hypercapnic respiratory failure, chronic     COPD (chronic obstructive pulmonary disease)     Hepatomegaly     Chronic combined systolic and diastolic CHF (congestive heart failure)     Decubitus ulcer     Type 2 diabetes mellitus, without long-term current use of insulin     Retinal detachment of left eye with multiple breaks     Chronic pain syndrome     Other nonthrombocytopenic purpura     Encephalopathy, metabolic     Chronically on benzodiazepine therapy     Chronic prescription opiate use     Flank hernia     Iron deficiency anemia due to sideropenic dysphagia     Pressure ulcer of  other site, stage 3/resolved        Follow-up  Associated symptoms include arthralgias, coughing, fatigue, joint swelling, myalgias, a rash and a visual change. Pertinent negatives include no congestion, nausea or weakness.   Diabetes  She presents for her follow-up diabetic visit. She has type 2 diabetes mellitus. Onset time: several. Her disease course has been improving. Hypoglycemia symptoms include nervousness/anxiousness. Pertinent negatives for hypoglycemia include no confusion, mood changes, pallor or tremors. Associated symptoms include fatigue, foot paresthesias and visual change. Pertinent negatives for diabetes include no blurred vision, no polydipsia, no polyuria and no weakness. There are no hypoglycemic complications. Symptoms are improving. Diabetic complications include autonomic neuropathy, nephropathy, peripheral neuropathy and PVD. Risk factors for coronary artery disease include diabetes mellitus, hypertension, post-menopausal, sedentary lifestyle, stress and tobacco exposure. Current diabetic treatment includes oral agent (dual therapy) and insulin injections. She is compliant with treatment most of the time. Her home blood glucose trend is fluctuating minimally. Her breakfast blood glucose is taken between 9-10 am. Her breakfast blood glucose range is generally 130-140 mg/dl.     Review of Systems   Constitutional: Positive for fatigue. Negative for appetite change and unexpected weight change.   HENT: Negative for congestion, hearing loss and sinus pain.    Eyes: Positive for visual disturbance. Negative for blurred vision.   Respiratory: Positive for cough, chest tightness and shortness of breath. Negative for wheezing.    Cardiovascular: Negative for palpitations and leg swelling.   Gastrointestinal: Positive for abdominal distention. Negative for nausea.   Endocrine: Negative for polydipsia and polyuria.   Genitourinary: Positive for urgency. Negative for frequency.   Musculoskeletal:  Positive for arthralgias, back pain, gait problem, joint swelling, myalgias and neck stiffness.   Skin: Positive for rash. Negative for pallor.   Allergic/Immunologic: Negative for environmental allergies.   Neurological: Negative for tremors, weakness and light-headedness.   Psychiatric/Behavioral: Positive for behavioral problems. Negative for confusion and decreased concentration. The patient is nervous/anxious.        Patient Active Problem List   Diagnosis    Severe obesity (BMI 35.0-35.9 with comorbidity)    Diabetes mellitus with neuropathy    Long term current use of anticoagulant therapy    Chronic obstructive pulmonary disease with acute exacerbation    Major depression, recurrent, chronic    GERD (gastroesophageal reflux disease)    Tobacco dependency    Chronic atrial fibrillation    Hypertension associated with diabetes    PVD (peripheral vascular disease)    Abdominal aortic atherosclerosis    Dependency on pain medication    Iron deficiency anemia    DDD (degenerative disc disease), lumbar    Type 2 diabetes mellitus with diabetic neuropathy, without long-term current use of insulin    Hyperlipidemia associated with type 2 diabetes mellitus    Mixed restrictive and obstructive lung disease    Hypercapnic respiratory failure, chronic    Acute on chronic diastolic congestive heart failure    COPD (chronic obstructive pulmonary disease)    Acute on chronic respiratory failure with hypoxia    Chest pain    Hepatomegaly    Chronic combined systolic and diastolic CHF (congestive heart failure)    Type 2 diabetes mellitus, without long-term current use of insulin    Retinal detachment of left eye with multiple breaks    Chronic pain syndrome    Encephalopathy, metabolic    Chronic prescription opiate use    Flank hernia    Iron deficiency anemia due to sideropenic dysphagia       Objective:      Physical Exam  Vitals and nursing note reviewed.   Constitutional:        Appearance: She is well-developed. She is obese. She is ill-appearing.   HENT:      Right Ear: Decreased hearing noted.      Left Ear: Decreased hearing noted.   Cardiovascular:      Rate and Rhythm: Normal rate and regular rhythm.      Pulses:           Dorsalis pedis pulses are 3+ on the right side and 3+ on the left side.        Posterior tibial pulses are 3+ on the right side and 3+ on the left side.      Heart sounds: Normal heart sounds.   Pulmonary:      Effort: Pulmonary effort is normal.      Breath sounds: Normal breath sounds.   Abdominal:      General: Abdomen is protuberant. Bowel sounds are normal.      Palpations: There is hepatomegaly and mass.      Tenderness: There is generalized abdominal tenderness.      Hernia: A hernia is present. Hernia is present in the ventral area.          Comments: Huge hernia with external bowel sounds of small bowels and large bowels.   Musculoskeletal:      Lumbar back: Spasms and tenderness present. Decreased range of motion.      Comments: Patient has moderate lumbar pain.  The pain he has been localized to the lumbar area and has a radiated to both hips.  Will results with present medications.  Patient complains difficult to walk and ambulate.   Feet:      Right foot:      Protective Sensation: 6 sites tested. 6 sites sensed.      Skin integrity: No ulcer, blister or skin breakdown.      Left foot:      Protective Sensation: 6 sites tested. 6 sites sensed.      Skin integrity: No ulcer, blister or skin breakdown.   Skin:     General: Skin is warm and dry.   Neurological:      Mental Status: She is alert and oriented to person, place, and time.   Psychiatric:         Attention and Perception: Attention normal.         Mood and Affect: Mood normal. Affect is labile.         Speech: Speech normal.         Cognition and Memory: Cognition and memory normal.         Judgment: Judgment normal.         Lab Results   Component Value Date    WBC 7.81 04/29/2022    HGB 11.5  (L) 04/29/2022    HCT 37.9 04/29/2022     04/29/2022    CHOL 89 (L) 03/25/2022    TRIG 52 03/25/2022    HDL 37 (L) 03/25/2022    ALT 20 04/29/2022    AST 28 04/29/2022     (L) 07/22/2022    K 4.5 07/22/2022    CL 97 07/22/2022    CREATININE 0.7 07/22/2022    BUN 20 07/22/2022    CO2 26 07/22/2022    TSH 1.309 01/11/2021    INR 1.0 06/27/2019    HGBA1C 5.1 07/22/2022     The ASCVD Risk score (Rellvidhya GUERRERO Jr., et al., 2013) failed to calculate for the following reasons:    The valid total cholesterol range is 130 to 320 mg/dL    Assessment:       1. Nonrheumatic mitral valve stenosis with insufficiency    2. Candidiasis of breast    3. Hypertension associated with diabetes    4. Lumbar degenerative disc disease    5. Diastolic CHF with preserved left ventricular function, NYHA class 2    6. Type 2 diabetes mellitus with diabetic peripheral angiopathy without gangrene, without long-term current use of insulin    7. Therapeutic drug monitoring        Plan:       Nonrheumatic mitral valve stenosis with insufficiency  -     Ambulatory referral/consult to Cardiology; Future; Expected date: 08/02/2022    Candidiasis of breast  -     nystatin-triamcinolone (MYCOLOG II) cream; Apply topically 3 (three) times daily. to affected area  Dispense: 30 g; Refill: 3  -     nystatin (NYSTOP) powder; APPLY TOPICALLY TWICE DAILY  Dispense: 120 g; Refill: 11    Hypertension associated with diabetes  -     atorvastatin (LIPITOR) 10 MG tablet; Take 1 tablet (10 mg total) by mouth every other day.  Dispense: 35 tablet; Refill: 3  -     potassium chloride SA (K-DUR,KLOR-CON) 20 MEQ tablet; Take 1 tablet (20 mEq total) by mouth once daily.  Dispense: 90 tablet; Refill: 11    Lumbar degenerative disc disease  -     gabapentin (NEURONTIN) 800 MG tablet; Take 1 tablet (800 mg total) by mouth 3 (three) times daily.  Dispense: 90 tablet; Refill: 11    Diastolic CHF with preserved left ventricular function, NYHA class 2  -     B-TYPE  NATRIURETIC PEPTIDE; Future; Expected date: 07/26/2022  -     spironolactone (ALDACTONE) 50 MG tablet; Take 1 tablet (50 mg total) by mouth once daily.  Dispense: 30 tablet; Refill: 11    Type 2 diabetes mellitus with diabetic peripheral angiopathy without gangrene, without long-term current use of insulin  -     empagliflozin (JARDIANCE) 10 mg tablet; Take 1 tablet (10 mg total) by mouth once daily.  Dispense: 90 tablet; Refill: 3    Therapeutic drug monitoring  -     [facility] Drug Screen      Lab Results   Component Value Date    HGBA1C 5.1 07/22/2022     DM.The current medical regimen is effective;  continue present plan and medications.  Ht.Stable and controlled. Continue current treatment plan as previously prescribed  Patient readiness: acceptance and barriers:readiness, social stressors and environmental  I have checked the patient's  prior to prescribing opioids.  I have recommended that this patient have a immunization for Covid and smoking cessation but she declines at this time. I have discussed the risks and benefits of this procedure with her. The patient verbalizes understanding.  She will use Ozempic due to Heart and weight loss indications.  35-minute visit. 20 minutes spent counseling patient on diet, exercise, and weight loss.  This has been fully explained to the patient, who indicates understanding.  Review plate method  Review foods in home  Discuss food labels  Refer to exercise program  Assist with medical visit preparation  Review patient results  Review patient education about results  Review chronic disease interventions (see specific disease intervention)  Assess knowledge level  Provide and discuss information/education material  Encourage communication with physician  Monitor compliance  Educate on risk of non-compliance  Give food and resource list  Review patient education material (see patient instructions)  Review patient education material (see patient instructions)  Refer to  exercise program  Review stress management techniques  Review patient education material (see patient instructions)  Assess support system  Discuss stress management techniques  Review normal ranges for labs    Discussed health maintenance guidelines appropriate for age.  Discussed health maintenance guidelines appropriate for age.  Discussed health maintenance guidelines appropriate for age.  Discussed health maintenance guidelines appropriate for age.

## 2022-07-29 ENCOUNTER — PATIENT MESSAGE (OUTPATIENT)
Dept: FAMILY MEDICINE | Facility: CLINIC | Age: 72
End: 2022-07-29
Payer: MEDICARE

## 2022-07-29 ENCOUNTER — TELEPHONE (OUTPATIENT)
Dept: FAMILY MEDICINE | Facility: CLINIC | Age: 72
End: 2022-07-29
Payer: MEDICARE

## 2022-07-29 DIAGNOSIS — M51.36 LUMBAR DEGENERATIVE DISC DISEASE: ICD-10-CM

## 2022-07-29 RX ORDER — HYDROCODONE BITARTRATE AND ACETAMINOPHEN 7.5; 325 MG/1; MG/1
1 TABLET ORAL EVERY 12 HOURS PRN
Qty: 60 TABLET | Refills: 0 | Status: CANCELLED | OUTPATIENT
Start: 2022-07-29 | End: 2022-08-28

## 2022-07-29 NOTE — TELEPHONE ENCOUNTER
No new care gaps identified.  [facility] Embedded Care Gaps. Reference number: [id number]. 7/29/2022   9:10:02 AM CDT

## 2022-07-29 NOTE — TELEPHONE ENCOUNTER
Spoke to pharmacy states gabapentin, NORCO, & Klonopin can no longer be filled at [facility]. [facility] is stating they have a new policy that this is a dangerous drug cocktail and will not allow to fill all 3 medications. Please advise

## 2022-07-29 NOTE — TELEPHONE ENCOUNTER
----- Message from Erin Felix sent at 7/29/2022 12:48 PM CDT -----  Who Called: [facility]    What is the reqeust in detail: Requesting call back to discuss the following 2 prescriptions. Pharmacy is asking which of these are to be dispensed. Please advise.     HYDROcodone-acetaminophen (NORCO) 7.5-325 mg per tablet 60 tablet 0 7/28/2022 8/27/2022 No  Sig - Route: Take 1 tablet by mouth every 12 (twelve) hours as needed for Pain. Medical necessary for greater than 7 days for chronic pain. Hydrocodone is contraindicated with Klonopin. - Oral  Sent to pharmacy as: HYDROcodone-acetaminophen (NORCO) 7.5-325 mg per tablet  Class: Normal  Earliest Fill Date: 7/28/2022  Notes to Pharmacy: Quantity prescribed more than 7 day supply? Yes, quantity medically necessary.The use of Benzodiazepines is contraindicated with concomitant use of opiates. High risk for toxicities and also death.    clonazePAM (KLONOPIN) 0.5 MG tablet 30 tablet 3 7/28/2022  No  Sig - Route: Take 1 tablet (0.5 mg total) by mouth every evening. Insomnia - Oral  Sent to pharmacy as: clonazePAM (KLONOPIN) 0.5 MG tablet  Class: Normal  Notes to Pharmacy: The use of Benzodiazepines is contraindicated with concomitant use of opiates. High risk for toxicities and also death.        Can the clinic reply by [facility]? No    Best Call Back Number: [phone]    Additional Information:

## 2022-07-30 LAB
6MAM UR QL: NOT DETECTED
7AMINOCLONAZEPAM UR QL: PRESENT
A-OH ALPRAZ UR QL: NOT DETECTED
ALPHA-OH-MIDAZOLAM: NOT DETECTED
ALPRAZ UR QL: NOT DETECTED
AMPHET UR QL SCN: NOT DETECTED
ANNOTATION COMMENT IMP: NORMAL
ANNOTATION COMMENT IMP: NORMAL
BARBITURATES UR QL: NOT DETECTED
BUPRENORPHINE UR QL: NOT DETECTED
BZE UR QL: NOT DETECTED
CARBOXYTHC UR QL: NOT DETECTED
CARISOPRODOL UR QL: NOT DETECTED
CLONAZEPAM UR QL: NOT DETECTED
CODEINE UR QL: NOT DETECTED
CREAT UR-MCNC: 57.9 MG/DL (ref 20–400)
DIAZEPAM UR QL: NOT DETECTED
ETHYL GLUCURONIDE UR QL: NOT DETECTED
FENTANYL UR QL: NOT DETECTED
GABAPENTIN: PRESENT
HYDROCODONE UR QL: PRESENT
HYDROMORPHONE UR QL: PRESENT
LORAZEPAM UR QL: NOT DETECTED
MDA UR QL: NOT DETECTED
MDEA UR QL: NOT DETECTED
MDMA UR QL: NOT DETECTED
ME-PHENIDATE UR QL: NOT DETECTED
METHADONE UR QL: NOT DETECTED
METHAMPHET UR QL: NOT DETECTED
MIDAZOLAM UR QL SCN: NOT DETECTED
MORPHINE UR QL: NOT DETECTED
NALOXONE: NOT DETECTED
NORBUPRENORPHINE UR QL CFM: NOT DETECTED
NORDIAZEPAM UR QL: NOT DETECTED
NORFENTANYL UR QL: NOT DETECTED
NORHYDROCODONE UR QL CFM: PRESENT
NORMEPERIDINE UR QL CFM: NOT DETECTED
NOROXYCODONE UR QL CFM: NOT DETECTED
NOROXYMORPHONE UR QL SCN: NOT DETECTED
OXAZEPAM UR QL: NOT DETECTED
OXYCODONE UR QL: NOT DETECTED
OXYMORPHONE UR QL: NOT DETECTED
PATHOLOGY STUDY: NORMAL
PCP UR QL: NOT DETECTED
PHENTERMINE UR QL: NOT DETECTED
PREGABALIN: NOT DETECTED
SERVICE CMNT-IMP: NORMAL
TAPENTADOL UR QL SCN: NOT DETECTED
TAPENTADOL UR QL SCN: NOT DETECTED
TEMAZEPAM UR QL: NOT DETECTED
TRAMADOL UR QL: NOT DETECTED
ZOLPIDEM METABOLITE: NOT DETECTED
ZOLPIDEM UR QL: NOT DETECTED

## 2022-08-15 ENCOUNTER — PATIENT MESSAGE (OUTPATIENT)
Dept: FAMILY MEDICINE | Facility: CLINIC | Age: 72
End: 2022-08-15
Payer: MEDICARE

## 2022-08-16 ENCOUNTER — HOSPITAL ENCOUNTER (INPATIENT)
Facility: HOSPITAL | Age: 72
LOS: 2 days | Discharge: HOME | DRG: 872 | End: 2022-08-19
Attending: EMERGENCY MEDICINE | Admitting: INTERNAL MEDICINE
Payer: MEDICARE

## 2022-08-16 DIAGNOSIS — I15.2 HYPERTENSION ASSOCIATED WITH DIABETES: ICD-10-CM

## 2022-08-16 DIAGNOSIS — L03.311 CELLULITIS OF ABDOMINAL WALL: ICD-10-CM

## 2022-08-16 DIAGNOSIS — E11.59 HYPERTENSION ASSOCIATED WITH DIABETES: ICD-10-CM

## 2022-08-16 DIAGNOSIS — A41.9 SEPSIS: ICD-10-CM

## 2022-08-16 DIAGNOSIS — E08.40 DIABETES MELLITUS DUE TO UNDERLYING CONDITION WITH DIABETIC NEUROPATHY, WITHOUT LONG-TERM CURRENT USE OF INSULIN: Primary | Chronic | ICD-10-CM

## 2022-08-16 LAB
ALBUMIN SERPL BCP-MCNC: 4 G/DL (ref 3.5–5.2)
ALP SERPL-CCNC: 125 U/L (ref 55–135)
ALT SERPL W/O P-5'-P-CCNC: 30 U/L (ref 10–44)
ANION GAP SERPL CALC-SCNC: 13 MMOL/L (ref 8–16)
AST SERPL-CCNC: 32 U/L (ref 10–40)
BASOPHILS # BLD AUTO: 0.08 K/UL (ref 0–0.2)
BASOPHILS NFR BLD: 0.6 % (ref 0–1.9)
BILIRUB SERPL-MCNC: 0.5 MG/DL (ref 0.1–1)
BUN SERPL-MCNC: 24 MG/DL (ref 8–23)
CALCIUM SERPL-MCNC: 10 MG/DL (ref 8.7–10.5)
CHLORIDE SERPL-SCNC: 100 MMOL/L (ref 95–110)
CO2 SERPL-SCNC: 27 MMOL/L (ref 23–29)
CREAT SERPL-MCNC: 0.7 MG/DL (ref 0.5–1.4)
DIFFERENTIAL METHOD: ABNORMAL
EOSINOPHIL # BLD AUTO: 0.2 K/UL (ref 0–0.5)
EOSINOPHIL NFR BLD: 1.6 % (ref 0–8)
ERYTHROCYTE [DISTWIDTH] IN BLOOD BY AUTOMATED COUNT: 13.4 % (ref 11.5–14.5)
EST. GFR  (NO RACE VARIABLE): >60 ML/MIN/1.73 M^2
GLUCOSE SERPL-MCNC: 108 MG/DL (ref 70–110)
HCT VFR BLD AUTO: 45.4 % (ref 37–48.5)
HGB BLD-MCNC: 14.4 G/DL (ref 12–16)
IMM GRANULOCYTES # BLD AUTO: 0.05 K/UL (ref 0–0.04)
IMM GRANULOCYTES NFR BLD AUTO: 0.4 % (ref 0–0.5)
LACTATE SERPL-SCNC: 2.1 MMOL/L (ref 0.5–2.2)
LYMPHOCYTES # BLD AUTO: 1.3 K/UL (ref 1–4.8)
LYMPHOCYTES NFR BLD: 9.9 % (ref 18–48)
MCH RBC QN AUTO: 31 PG (ref 27–31)
MCHC RBC AUTO-ENTMCNC: 31.7 G/DL (ref 32–36)
MCV RBC AUTO: 98 FL (ref 82–98)
MONOCYTES # BLD AUTO: 1.4 K/UL (ref 0.3–1)
MONOCYTES NFR BLD: 10.4 % (ref 4–15)
NEUTROPHILS # BLD AUTO: 10.2 K/UL (ref 1.8–7.7)
NEUTROPHILS NFR BLD: 77.1 % (ref 38–73)
NRBC BLD-RTO: 0 /100 WBC
PLATELET # BLD AUTO: 184 K/UL (ref 150–450)
PMV BLD AUTO: 10.4 FL (ref 9.2–12.9)
POTASSIUM SERPL-SCNC: 5.3 MMOL/L (ref 3.5–5.1)
PROT SERPL-MCNC: 8.3 G/DL (ref 6–8.4)
RBC # BLD AUTO: 4.64 M/UL (ref 4–5.4)
SARS-COV-2 RDRP RESP QL NAA+PROBE: NEGATIVE
SODIUM SERPL-SCNC: 140 MMOL/L (ref 136–145)
WBC # BLD AUTO: 13.18 K/UL (ref 3.9–12.7)

## 2022-08-16 PROCEDURE — 96365 THER/PROPH/DIAG IV INF INIT: CPT

## 2022-08-16 PROCEDURE — 80053 COMPREHEN METABOLIC PANEL: CPT | Performed by: EMERGENCY MEDICINE

## 2022-08-16 PROCEDURE — 25000003 PHARM REV CODE 250: Performed by: EMERGENCY MEDICINE

## 2022-08-16 PROCEDURE — U0002 COVID-19 LAB TEST NON-CDC: HCPCS | Performed by: EMERGENCY MEDICINE

## 2022-08-16 PROCEDURE — 63600175 PHARM REV CODE 636 W HCPCS: Performed by: EMERGENCY MEDICINE

## 2022-08-16 PROCEDURE — 96368 THER/DIAG CONCURRENT INF: CPT

## 2022-08-16 PROCEDURE — 83605 ASSAY OF LACTIC ACID: CPT | Performed by: EMERGENCY MEDICINE

## 2022-08-16 PROCEDURE — 96375 TX/PRO/DX INJ NEW DRUG ADDON: CPT

## 2022-08-16 PROCEDURE — 99291 CRITICAL CARE FIRST HOUR: CPT | Mod: 25

## 2022-08-16 PROCEDURE — 36415 COLL VENOUS BLD VENIPUNCTURE: CPT | Performed by: EMERGENCY MEDICINE

## 2022-08-16 PROCEDURE — 87040 BLOOD CULTURE FOR BACTERIA: CPT | Mod: 59 | Performed by: EMERGENCY MEDICINE

## 2022-08-16 PROCEDURE — 85025 COMPLETE CBC W/AUTO DIFF WBC: CPT | Performed by: EMERGENCY MEDICINE

## 2022-08-16 RX ORDER — ACETAMINOPHEN 500 MG
1000 TABLET ORAL
Status: COMPLETED | OUTPATIENT
Start: 2022-08-16 | End: 2022-08-16

## 2022-08-16 RX ORDER — ONDANSETRON 2 MG/ML
8 INJECTION INTRAMUSCULAR; INTRAVENOUS
Status: COMPLETED | OUTPATIENT
Start: 2022-08-16 | End: 2022-08-16

## 2022-08-16 RX ADMIN — PIPERACILLIN SODIUM AND TAZOBACTAM SODIUM 4.5 G: 4; .5 INJECTION, POWDER, LYOPHILIZED, FOR SOLUTION INTRAVENOUS at 10:08

## 2022-08-16 RX ADMIN — SODIUM CHLORIDE 500 ML: 0.9 INJECTION, SOLUTION INTRAVENOUS at 10:08

## 2022-08-16 RX ADMIN — VANCOMYCIN HYDROCHLORIDE 2000 MG: 1 INJECTION, POWDER, LYOPHILIZED, FOR SOLUTION INTRAVENOUS at 10:08

## 2022-08-16 RX ADMIN — ACETAMINOPHEN 1000 MG: 500 TABLET ORAL at 10:08

## 2022-08-16 RX ADMIN — ONDANSETRON 8 MG: 2 INJECTION INTRAMUSCULAR; INTRAVENOUS at 10:08

## 2022-08-17 PROBLEM — L03.90 CELLULITIS: Status: ACTIVE | Noted: 2018-08-22

## 2022-08-17 LAB
ALBUMIN SERPL BCP-MCNC: 3.5 G/DL (ref 3.5–5.2)
ALP SERPL-CCNC: 103 U/L (ref 55–135)
ALT SERPL W/O P-5'-P-CCNC: 27 U/L (ref 10–44)
ANION GAP SERPL CALC-SCNC: 13 MMOL/L (ref 8–16)
AST SERPL-CCNC: 29 U/L (ref 10–40)
BASOPHILS # BLD AUTO: 0.07 K/UL (ref 0–0.2)
BASOPHILS NFR BLD: 0.4 % (ref 0–1.9)
BILIRUB SERPL-MCNC: 0.8 MG/DL (ref 0.1–1)
BILIRUB UR QL STRIP: NEGATIVE
BUN SERPL-MCNC: 24 MG/DL (ref 8–23)
CALCIUM SERPL-MCNC: 9.2 MG/DL (ref 8.7–10.5)
CHLORIDE SERPL-SCNC: 102 MMOL/L (ref 95–110)
CLARITY UR: CLEAR
CO2 SERPL-SCNC: 21 MMOL/L (ref 23–29)
COLOR UR: YELLOW
CREAT SERPL-MCNC: 0.7 MG/DL (ref 0.5–1.4)
DIFFERENTIAL METHOD: ABNORMAL
EOSINOPHIL # BLD AUTO: 0.1 K/UL (ref 0–0.5)
EOSINOPHIL NFR BLD: 0.3 % (ref 0–8)
ERYTHROCYTE [DISTWIDTH] IN BLOOD BY AUTOMATED COUNT: 13.4 % (ref 11.5–14.5)
EST. GFR  (NO RACE VARIABLE): >60 ML/MIN/1.73 M^2
GLUCOSE SERPL-MCNC: 118 MG/DL (ref 70–110)
GLUCOSE UR QL STRIP: ABNORMAL
HCT VFR BLD AUTO: 42.8 % (ref 37–48.5)
HGB BLD-MCNC: 13.5 G/DL (ref 12–16)
HGB UR QL STRIP: ABNORMAL
IMM GRANULOCYTES # BLD AUTO: 0.1 K/UL (ref 0–0.04)
IMM GRANULOCYTES NFR BLD AUTO: 0.5 % (ref 0–0.5)
KETONES UR QL STRIP: NEGATIVE
LACTATE SERPL-SCNC: 1 MMOL/L (ref 0.5–2.2)
LEUKOCYTE ESTERASE UR QL STRIP: NEGATIVE
LYMPHOCYTES # BLD AUTO: 0.8 K/UL (ref 1–4.8)
LYMPHOCYTES NFR BLD: 4.1 % (ref 18–48)
MAGNESIUM SERPL-MCNC: 2.3 MG/DL (ref 1.6–2.6)
MCH RBC QN AUTO: 30.5 PG (ref 27–31)
MCHC RBC AUTO-ENTMCNC: 31.5 G/DL (ref 32–36)
MCV RBC AUTO: 97 FL (ref 82–98)
MONOCYTES # BLD AUTO: 2 K/UL (ref 0.3–1)
MONOCYTES NFR BLD: 9.9 % (ref 4–15)
NEUTROPHILS # BLD AUTO: 17 K/UL (ref 1.8–7.7)
NEUTROPHILS NFR BLD: 84.8 % (ref 38–73)
NITRITE UR QL STRIP: NEGATIVE
NRBC BLD-RTO: 0 /100 WBC
PH UR STRIP: 6 [PH] (ref 5–8)
PHOSPHATE SERPL-MCNC: 3.3 MG/DL (ref 2.7–4.5)
PLATELET # BLD AUTO: 184 K/UL (ref 150–450)
PMV BLD AUTO: 10.2 FL (ref 9.2–12.9)
POCT GLUCOSE: 115 MG/DL (ref 70–110)
POCT GLUCOSE: 126 MG/DL (ref 70–110)
POCT GLUCOSE: 85 MG/DL (ref 70–110)
POCT GLUCOSE: 99 MG/DL (ref 70–110)
POTASSIUM SERPL-SCNC: 4.4 MMOL/L (ref 3.5–5.1)
PROT SERPL-MCNC: 7.2 G/DL (ref 6–8.4)
PROT UR QL STRIP: NEGATIVE
RBC # BLD AUTO: 4.43 M/UL (ref 4–5.4)
SODIUM SERPL-SCNC: 136 MMOL/L (ref 136–145)
SP GR UR STRIP: 1.02 (ref 1–1.03)
URN SPEC COLLECT METH UR: ABNORMAL
UROBILINOGEN UR STRIP-ACNC: NEGATIVE EU/DL
WBC # BLD AUTO: 19.99 K/UL (ref 3.9–12.7)

## 2022-08-17 PROCEDURE — 83735 ASSAY OF MAGNESIUM: CPT

## 2022-08-17 PROCEDURE — 96366 THER/PROPH/DIAG IV INF ADDON: CPT

## 2022-08-17 PROCEDURE — 63600175 PHARM REV CODE 636 W HCPCS: Performed by: EMERGENCY MEDICINE

## 2022-08-17 PROCEDURE — 99223 1ST HOSP IP/OBS HIGH 75: CPT | Mod: S$GLB,,, | Performed by: INTERNAL MEDICINE

## 2022-08-17 PROCEDURE — 25000003 PHARM REV CODE 250: Performed by: EMERGENCY MEDICINE

## 2022-08-17 PROCEDURE — 81003 URINALYSIS AUTO W/O SCOPE: CPT | Performed by: INTERNAL MEDICINE

## 2022-08-17 PROCEDURE — 25000003 PHARM REV CODE 250

## 2022-08-17 PROCEDURE — 99223 PR INITIAL HOSPITAL CARE,LEVL III: ICD-10-PCS | Mod: S$GLB,,, | Performed by: INTERNAL MEDICINE

## 2022-08-17 PROCEDURE — 25000003 PHARM REV CODE 250: Performed by: INTERNAL MEDICINE

## 2022-08-17 PROCEDURE — 84100 ASSAY OF PHOSPHORUS: CPT

## 2022-08-17 PROCEDURE — 63600175 PHARM REV CODE 636 W HCPCS

## 2022-08-17 PROCEDURE — 80053 COMPREHEN METABOLIC PANEL: CPT

## 2022-08-17 PROCEDURE — 85025 COMPLETE CBC W/AUTO DIFF WBC: CPT

## 2022-08-17 PROCEDURE — 63700000 PHARM REV CODE 250 ALT 637 W/O HCPCS: Performed by: INTERNAL MEDICINE

## 2022-08-17 PROCEDURE — 12000002 HC ACUTE/MED SURGE SEMI-PRIVATE ROOM

## 2022-08-17 PROCEDURE — 36415 COLL VENOUS BLD VENIPUNCTURE: CPT | Performed by: EMERGENCY MEDICINE

## 2022-08-17 PROCEDURE — 83605 ASSAY OF LACTIC ACID: CPT | Performed by: EMERGENCY MEDICINE

## 2022-08-17 RX ORDER — LANOLIN ALCOHOL/MO/W.PET/CERES
800 CREAM (GRAM) TOPICAL
Status: DISCONTINUED | OUTPATIENT
Start: 2022-08-17 | End: 2022-08-19 | Stop reason: HOSPADM

## 2022-08-17 RX ORDER — CLINDAMYCIN HYDROCHLORIDE 150 MG/1
300 CAPSULE ORAL EVERY 8 HOURS
Status: DISCONTINUED | OUTPATIENT
Start: 2022-08-17 | End: 2022-08-19 | Stop reason: HOSPADM

## 2022-08-17 RX ORDER — FLUCONAZOLE 100 MG/1
200 TABLET ORAL DAILY
Status: COMPLETED | OUTPATIENT
Start: 2022-08-17 | End: 2022-08-19

## 2022-08-17 RX ORDER — FUROSEMIDE 40 MG/1
40 TABLET ORAL DAILY
Status: DISCONTINUED | OUTPATIENT
Start: 2022-08-17 | End: 2022-08-19 | Stop reason: HOSPADM

## 2022-08-17 RX ORDER — ACETAMINOPHEN 325 MG/1
650 TABLET ORAL EVERY 4 HOURS PRN
Status: DISCONTINUED | OUTPATIENT
Start: 2022-08-17 | End: 2022-08-17

## 2022-08-17 RX ORDER — ATORVASTATIN CALCIUM 10 MG/1
10 TABLET, FILM COATED ORAL EVERY OTHER DAY
Status: DISCONTINUED | OUTPATIENT
Start: 2022-08-17 | End: 2022-08-19 | Stop reason: HOSPADM

## 2022-08-17 RX ORDER — IBUPROFEN 200 MG
16 TABLET ORAL
Status: DISCONTINUED | OUTPATIENT
Start: 2022-08-17 | End: 2022-08-19 | Stop reason: HOSPADM

## 2022-08-17 RX ORDER — IBUPROFEN 600 MG/1
600 TABLET ORAL EVERY 6 HOURS PRN
Status: DISCONTINUED | OUTPATIENT
Start: 2022-08-17 | End: 2022-08-19 | Stop reason: HOSPADM

## 2022-08-17 RX ORDER — SODIUM CHLORIDE 9 MG/ML
INJECTION, SOLUTION INTRAVENOUS ONCE
Status: COMPLETED | OUTPATIENT
Start: 2022-08-17 | End: 2022-08-17

## 2022-08-17 RX ORDER — GABAPENTIN 400 MG/1
800 CAPSULE ORAL
Refills: 11 | Status: DISCONTINUED | OUTPATIENT
Start: 2022-08-17 | End: 2022-08-19 | Stop reason: HOSPADM

## 2022-08-17 RX ORDER — ONDANSETRON 2 MG/ML
4 INJECTION INTRAMUSCULAR; INTRAVENOUS EVERY 6 HOURS PRN
Status: DISCONTINUED | OUTPATIENT
Start: 2022-08-17 | End: 2022-08-19 | Stop reason: HOSPADM

## 2022-08-17 RX ORDER — INSULIN ASPART 100 [IU]/ML
0-5 INJECTION, SOLUTION INTRAVENOUS; SUBCUTANEOUS
Status: DISCONTINUED | OUTPATIENT
Start: 2022-08-17 | End: 2022-08-19 | Stop reason: HOSPADM

## 2022-08-17 RX ORDER — GLUCAGON 1 MG
1 KIT INJECTION
Status: DISCONTINUED | OUTPATIENT
Start: 2022-08-17 | End: 2022-08-19 | Stop reason: HOSPADM

## 2022-08-17 RX ORDER — NALOXONE HCL 0.4 MG/ML
0.02 VIAL (ML) INJECTION
Status: DISCONTINUED | OUTPATIENT
Start: 2022-08-17 | End: 2022-08-19 | Stop reason: HOSPADM

## 2022-08-17 RX ORDER — TALC
9 POWDER (GRAM) TOPICAL NIGHTLY PRN
Status: DISCONTINUED | OUTPATIENT
Start: 2022-08-17 | End: 2022-08-19 | Stop reason: HOSPADM

## 2022-08-17 RX ORDER — AMOXICILLIN 250 MG
1 CAPSULE ORAL 2 TIMES DAILY
Status: DISCONTINUED | OUTPATIENT
Start: 2022-08-17 | End: 2022-08-19 | Stop reason: HOSPADM

## 2022-08-17 RX ORDER — SODIUM CHLORIDE 0.9 % (FLUSH) 0.9 %
3 SYRINGE (ML) INJECTION
Status: DISCONTINUED | OUTPATIENT
Start: 2022-08-17 | End: 2022-08-19 | Stop reason: HOSPADM

## 2022-08-17 RX ORDER — ACETAMINOPHEN 325 MG/1
650 TABLET ORAL EVERY 6 HOURS PRN
Status: DISCONTINUED | OUTPATIENT
Start: 2022-08-17 | End: 2022-08-19 | Stop reason: HOSPADM

## 2022-08-17 RX ORDER — HYDROCODONE BITARTRATE AND ACETAMINOPHEN 7.5; 325 MG/1; MG/1
1 TABLET ORAL EVERY 12 HOURS PRN
Status: DISCONTINUED | OUTPATIENT
Start: 2022-08-17 | End: 2022-08-19 | Stop reason: HOSPADM

## 2022-08-17 RX ORDER — IBUPROFEN 200 MG
24 TABLET ORAL
Status: DISCONTINUED | OUTPATIENT
Start: 2022-08-17 | End: 2022-08-19 | Stop reason: HOSPADM

## 2022-08-17 RX ADMIN — ATORVASTATIN CALCIUM 10 MG: 10 TABLET, FILM COATED ORAL at 09:08

## 2022-08-17 RX ADMIN — THERA TABS 1 TABLET: TAB at 09:08

## 2022-08-17 RX ADMIN — SODIUM CHLORIDE: 0.9 INJECTION, SOLUTION INTRAVENOUS at 02:08

## 2022-08-17 RX ADMIN — PIPERACILLIN SODIUM AND TAZOBACTAM SODIUM 4.5 G: 4; .5 INJECTION, POWDER, LYOPHILIZED, FOR SOLUTION INTRAVENOUS at 05:08

## 2022-08-17 RX ADMIN — GABAPENTIN 800 MG: 400 CAPSULE ORAL at 11:08

## 2022-08-17 RX ADMIN — ACETAMINOPHEN 650 MG: 325 TABLET ORAL at 05:08

## 2022-08-17 RX ADMIN — APIXABAN 5 MG: 2.5 TABLET, FILM COATED ORAL at 09:08

## 2022-08-17 RX ADMIN — PIPERACILLIN SODIUM AND TAZOBACTAM SODIUM 4.5 G: 4; .5 INJECTION, POWDER, LYOPHILIZED, FOR SOLUTION INTRAVENOUS at 01:08

## 2022-08-17 RX ADMIN — VANCOMYCIN HYDROCHLORIDE 1500 MG: 1.5 INJECTION, POWDER, LYOPHILIZED, FOR SOLUTION INTRAVENOUS at 10:08

## 2022-08-17 RX ADMIN — FLUCONAZOLE 200 MG: 100 TABLET ORAL at 09:08

## 2022-08-17 RX ADMIN — CLINDAMYCIN HYDROCHLORIDE 300 MG: 150 CAPSULE ORAL at 09:08

## 2022-08-17 RX ADMIN — DOCUSATE SODIUM AND SENNOSIDES 1 TABLET: 8.6; 5 TABLET, FILM COATED ORAL at 09:08

## 2022-08-17 RX ADMIN — FUROSEMIDE 40 MG: 40 TABLET ORAL at 09:08

## 2022-08-17 RX ADMIN — HYDROCODONE BITARTRATE AND ACETAMINOPHEN 1 TABLET: 7.5; 325 TABLET ORAL at 10:08

## 2022-08-17 RX ADMIN — DOCUSATE SODIUM AND SENNOSIDES 1 TABLET: 8.6; 5 TABLET, FILM COATED ORAL at 02:08

## 2022-08-17 RX ADMIN — IBUPROFEN 600 MG: 600 TABLET, FILM COATED ORAL at 12:08

## 2022-08-17 NOTE — ASSESSMENT & PLAN NOTE
Body mass index is 35.36 kg/m². Morbid obesity complicates all aspects of disease management from diagnostic modalities to treatment. Weight loss encouraged and health benefits explained to patient.        normal...

## 2022-08-17 NOTE — ED NOTES
Pt placed on telemetry box #[id number] with verification from [facility]. Pt transferred to room 309 via W/C. Message left on daughter's voicemail, per request, updating her on plan of care & admission.

## 2022-08-17 NOTE — H&P
[facility] Medicine  History & Physical    Patient Name: Nelly Tian  MRN: [id number]  Patient Class: OP- Observation  Admission Date: 8/16/2022  Attending Physician: Ree Corrales MD   Primary Care Provider: Constantine Bird MD         Patient information was obtained from patient, past medical records and ER records.     Subjective:     Principal Problem:Sepsis    Chief Complaint:   Chief Complaint   Patient presents with    Skin Ulcer     Pt has an wound on her stomach that appeared today.     Chills        HPI: Nelly Tian is a 71 y.o. y.o. female with a PMHx of A-fib, CHF, COPD, T2DM, CHF, COPD, and septic shock with numerous episodes of abdominal cellulitis who presented to the ED with abdominal cellulitis and abdominal pain onset x 1 day. She described lower abdominal pain as a sharp sensation that did not radiate. Denies alleviating or exacerbating factors. Pain was 10/10 at its worst and denies pain currently. She reports Hx of cellulitis to abdomen and states this is similar to previous episodes. She also reports fever and chills. She denies fatigue, CP, palpitations, SOB, cough, n/v/d, and changes in urination. ED workup was significant for elevated WBC, fever, and mild hyperkalemia. Hospital Medicine was consulted for further workup and management of the patient.           Past Medical History:   Diagnosis Date    *Atrial flutter     Angina pectoris 9/18/2017    Anxiety     Arthritis     Asthma     Atrial fibrillation     Back pain     Cataract     OD    CHF (congestive heart failure)     Chronically on benzodiazepine therapy 5/4/2019    COPD (chronic obstructive pulmonary disease)     Depression     Diabetes mellitus     Emphysema of lung     Heart failure     Hepatomegaly 2/3/2016    Hernia     History of MI (myocardial infarction) 1/19/2016    Hypercapnic respiratory failure, chronic 11/16/2016    Hyperlipidemia     Hypertension      Iron deficiency anemia 2/3/2016    Myocardial infarction     Obesity     Peripheral vascular disease     Pneumonia     Polyneuropathy     Retinal detachment     OS    Septic shock 2017    Skin ulcer 3/18/2017    Tobacco dependence     Type II 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, not stated as uncontrolled(250.60)        Past Surgical History:   Procedure Laterality Date    BREAST BIOPSY Left 10 yrs ago    benign    CATARACT EXTRACTION Left     OS      SECTION      x2    EYE SURGERY      HYSTERECTOMY      partial    OOPHORECTOMY      one ovary conserved    RETINAL DETACHMENT SURGERY      buckle --OS    TONSILLECTOMY         Review of patient's allergies indicates:   Allergen Reactions    Dilaudid [hydromorphone] Anaphylaxis     Other reaction(s): Anaphylaxis  Other reaction(s): Unknown    Zyvox [linezolid in dextrose 5%] Shortness Of Breath       No current facility-administered medications on file prior to encounter.     Current Outpatient Medications on File Prior to Encounter   Medication Sig    ascorbic acid, vitamin C, (VITAMIN C) 500 MG tablet Take 2 tablets (1,000 mg total) by mouth every evening.    atorvastatin (LIPITOR) 10 MG tablet Take 1 tablet (10 mg total) by mouth every other day.    ELIQUIS 5 mg Tab TAKE 1 TABLET TWICE DAILY    empagliflozin (JARDIANCE) 10 mg tablet Take 1 tablet (10 mg total) by mouth once daily.    fluticasone propionate (FLONASE) 50 mcg/actuation nasal spray SHAKE LIQUID AND USE 1 SPRAY(50 MCG) IN EACH NOSTRIL EVERY DAY    fluticasone-umeclidin-vilanter (TRELEGY ELLIPTA) 100-62.5-25 mcg DsDv INHALE 1 PUFF INTO THE LUNGS ONE TIME DAILY    furosemide (LASIX) 40 MG tablet TAKE 1 TABLET(40 MG) BY MOUTH EVERY DAY    gabapentin (NEURONTIN) 800 MG tablet Take 1 tablet (800 mg total) by mouth 3 (three) times daily.    HYDROcodone-acetaminophen (NORCO) 7.5-325 mg per tablet Take 1 tablet by mouth every 12 (twelve)  hours as needed for Pain. Medical necessary for greater than 7 days for chronic pain. Hydrocodone is contraindicated with Klonopin.    levalbuterol (XOPENEX) 0.63 mg/3 mL nebulizer solution USE 1 VIAL VIA NEBULIZER FOUR TIMES DAILY    LIDOcaine (LIDODERM) 5 % APPLY PATCH ONTO SKIN.REMOVE AND DISCARD PATCH WITHIN 12 HOURS OR AS DIRECTED BY MD    metFORMIN (GLUCOPHAGE) 500 MG tablet TAKE 1 TABLET (500 MG TOTAL) BY MOUTH DAILY WITH BREAKFAST.    multivitamin (THERAGRAN) tablet Take 1 tablet by mouth once daily.    nystatin (NYSTOP) powder APPLY TOPICALLY TWICE DAILY    nystatin-triamcinolone (MYCOLOG II) cream Apply topically 3 (three) times daily. to affected area    ondansetron (ZOFRAN-ODT) 8 MG TbDL DISSOLVE 1 TABLET ON THE TONGUE EVERY 8 HOURS AS NEEDED    potassium chloride SA (K-DUR,KLOR-CON) 20 MEQ tablet Take 1 tablet (20 mEq total) by mouth once daily.    spironolactone (ALDACTONE) 50 MG tablet Take 1 tablet (50 mg total) by mouth once daily.     Family History       Problem Relation (Age of Onset)    Alcohol abuse Mother    Arthritis Father, Sister    Blindness Son    Cancer Brother    Crohn's disease Sister    Early death Sister (30)    Heart disease Father, Sister (32), Sister    No Known Problems Daughter          Tobacco Use    Smoking status: Current Every Day Smoker     Packs/day: 0.30     Years: 50.00     Pack years: 15.00     Types: Cigarettes     Start date: 1/19/1968     Last attempt to quit: 8/2/2019     Years since quitting: 3.0    Smokeless tobacco: Never Used   Substance and Sexual Activity    Alcohol use: No     Alcohol/week: 0.0 standard drinks    Drug use: No    Sexual activity: Not Currently     Review of Systems   Constitutional:  Positive for chills and fever. Negative for fatigue.   HENT:  Negative for congestion, sore throat and trouble swallowing.    Eyes:  Negative for visual disturbance.   Respiratory:  Negative for cough, shortness of breath and wheezing.     Gastrointestinal:  Positive for abdominal pain (resolved). Negative for abdominal distention, diarrhea, nausea and vomiting.   Genitourinary:  Negative for difficulty urinating, dysuria and hematuria.   Musculoskeletal:  Negative for back pain.   Skin:  Positive for rash (to abdomen).   Neurological:  Negative for dizziness, light-headedness and headaches.   Hematological:  Bruises/bleeds easily.   Psychiatric/Behavioral:  Negative for confusion.    Objective:     Vital Signs (Most Recent):  Temp: (!) 103.6 °F (39.8 °C) (08/16/22 2352)  Pulse: 108 (08/17/22 0001)  Resp: 18 (08/16/22 2131)  BP: 128/62 (08/17/22 0001)  SpO2: 95 % (08/17/22 0001)   Vital Signs (24h Range):  Temp:  [98.4 °F (36.9 °C)-104.4 °F (40.2 °C)] 103.6 °F (39.8 °C)  Pulse:  [] 108  Resp:  [18] 18  SpO2:  [93 %-96 %] 95 %  BP: (123-156)/(58-90) 128/62     Weight: 106.6 kg (235 lb)  Body mass index is 35.73 kg/m².    Physical Exam  Vitals and nursing note reviewed.   Constitutional:       General: She is not in acute distress.     Appearance: Normal appearance. She is obese. She is ill-appearing (chronically).   HENT:      Head: Normocephalic and atraumatic.      Mouth/Throat:      Mouth: Mucous membranes are moist.      Pharynx: Oropharynx is clear.   Eyes:      Extraocular Movements: Extraocular movements intact.      Pupils: Pupils are equal, round, and reactive to light.   Cardiovascular:      Rate and Rhythm: Regular rhythm. Tachycardia present.      Pulses: Normal pulses.      Heart sounds: Normal heart sounds.   Pulmonary:      Effort: Pulmonary effort is normal. No respiratory distress.      Breath sounds: Decreased breath sounds present.   Abdominal:      General: Abdomen is flat. Bowel sounds are normal. There is no distension.      Palpations: Abdomen is soft.      Tenderness: There is no abdominal tenderness. There is no guarding or rebound.   Musculoskeletal:         General: Normal range of motion.      Cervical back: Normal  range of motion and neck supple.   Skin:     General: Skin is warm.      Capillary Refill: Capillary refill takes 2 to 3 seconds.             Comments: Extensive cellulitis. Large area of erythema and warmth to left lower abdomen. Nontender to palpation. No fluctuance appreciated.    Neurological:      General: No focal deficit present.      Mental Status: She is alert and oriented to person, place, and time. Mental status is at baseline.   Psychiatric:         Mood and Affect: Mood normal.         Behavior: Behavior normal.         CRANIAL NERVES     CN III, IV, VI   Pupils are equal, round, and reactive to light.     Significant Labs: All pertinent labs within the past 24 hours have been reviewed.  CBC:   Recent Labs   Lab 08/16/22 2251   WBC 13.18*   HGB 14.4   HCT 45.4        CMP:   Recent Labs   Lab 08/16/22 2251      K 5.3*      CO2 27      BUN 24*   CREATININE 0.7   CALCIUM 10.0   PROT 8.3   ALBUMIN 4.0   BILITOT 0.5   ALKPHOS 125   AST 32   ALT 30   ANIONGAP 13     Lactic Acid:   Recent Labs   Lab 08/16/22 2251   LACTATE 2.1       Significant Imaging: I have reviewed all pertinent imaging results/findings within the past 24 hours.    Assessment/Plan:     * Sepsis  This patient does have evidence of infective focus  My overall impression is sepsis. Vital signs were reviewed and noted in progress note.  Antibiotics given-   Antibiotics (From admission, onward)            Start     Stop Route Frequency Ordered    08/16/22 2230  piperacillin-tazobactam 4.5 g in dextrose 5 % 100 mL IVPB (ready to mix system)  (ED Adult Sepsis Treatment)         08/17 2229 IV Every 8 hours (non-standard times) 08/16/22 2217        Cultures were taken-   Microbiology Results (last 7 days)     Procedure Component Value Units Date/Time    Blood culture x two cultures. Draw prior to antibiotics. [[id number]] Collected: 08/16/22 2250    Order Status: Sent Specimen: Blood Updated: 08/16/22 2251    Blood  culture x two cultures. Draw prior to antibiotics. [[id number]] Collected: 08/16/22 2250    Order Status: Sent Specimen: Blood from Antecubital, Right Hand Updated: 08/16/22 2251        Latest lactate reviewed, they are-  Recent Labs   Lab 08/16/22 2251   LACTATE 2.1         Source- skin/soft tissue    Source control Achieved by- IV abx        Cellulitis of abdominal wall  Acute on chronic:  - IV abx  - US abdomen ordered  - gentle IVFH  - initial lactate: 2.1  - repeat lactate: 1.0        Chronic combined systolic and diastolic CHF (congestive heart failure)  Patient is identified as having Combined Systolic and Diastolic heart failure that is Chronic. CHF is currently controlled. Latest ECHO performed and demonstrates- Results for orders placed during the hospital encounter of 03/27/22    Echo    Interpretation Summary  · The left ventricle is severely enlarged with eccentric hypertrophy and severely decreased systolic function.  · The estimated ejection fraction is 25%.  · Moderate-to-severe mitral regurgitation.  · Mild right atrial enlargement.  · The estimated PA systolic pressure is 34 mmHg.  · Moderate left atrial enlargement.  · Mild pulmonic regurgitation.  · Mild tricuspid regurgitation.  · Normal right ventricular size with moderately reduced right ventricular systolic function.  · Atrial fibrillation observed with restrictive filling pattern present.  · Unchanged from prior  . Continue Furosemide Aldactone and monitor clinical status closely. Monitor on telemetry. Patient is off CHF pathway.  Monitor strict Is&Os and daily weights.  Place on fluid restriction of 2 L. Continue to stress to patient importance of self efficacy and  on diet for CHF. Last BNP reviewed- and noted below No results for input(s): BNP, BNPTRIAGEBLO in the last 168 hours..            Hyperkalemia  Acute:  - mild  - initial K: 5.3  - repeat K: 4.4  - on tele  - monitor electrolytes closely        COPD (chronic obstructive  pulmonary disease)  Patient's COPD is controlled currently.  Patient is currently off COPD Pathway. Continue scheduled inhalers Supplemental oxygen and monitor respiratory status closely.         ACP (advance care planning)  Advance Care Planning     Date: 08/17/2022    Code Status  In light of the patients advanced and life limiting illness,I engaged the the patient in a conversation about the patient's preferences for care  at the very end of life. The patient wishes to have a natural, peaceful death.  Along those lines, the patient does not wish to have CPR or other invasive treatments performed when her heart and/or breathing stops. I communicated to the patient that a DNR order would be placed in her medical record to reflect this preference.  I spent a total of 20 minutes engaging the patient in this advance care planning discussion.             Hyperlipidemia associated with type 2 diabetes mellitus   Patient is chronically on statin.will continue for now. Monitor clinically. Last LDL was   Lab Results   Component Value Date    LDLCALC 41.6 (L) 03/25/2022          Hypertension associated with diabetes  Chronic, controlled.  Latest blood pressure and vitals reviewed-   Temp:  [98.4 °F (36.9 °C)-104.4 °F (40.2 °C)]   Pulse:  []   Resp:  [16-18]   BP: (118-156)/(58-90)   SpO2:  [93 %-96 %] .   Home meds for hypertension were reviewed and noted below.   Hypertension Medications             furosemide (LASIX) 40 MG tablet TAKE 1 TABLET(40 MG) BY MOUTH EVERY DAY    spironolactone (ALDACTONE) 50 MG tablet Take 1 tablet (50 mg total) by mouth once daily.          While in the hospital, will manage blood pressure as follows; Adjust home antihypertensive regimen as follows- hold spironolactone givenmild hyperkalemia    Will utilize p.r.n. blood pressure medication only if patient's blood pressure greater than  180/110 and she develops symptoms such as worsening chest pain or shortness of breath.      Chronic  atrial fibrillation  Chronic:  - not currently on rate or rhythm control  - continue Eliquis        Major depression, recurrent, chronic        Long term current use of anticoagulant therapy  Chronic:  - continue Eliquis        Diabetes mellitus with neuropathy  Patient's FSGs are controlled on current medication regimen.  Last A1c reviewed-   Lab Results   Component Value Date    HGBA1C 5.1 07/22/2022     Most recent fingerstick glucose reviewed- No results for input(s): POCTGLUCOSE in the last 24 hours.  Current correctional scale  Low  Maintain anti-hyperglycemic dose as follows-   Antihyperglycemics (From admission, onward)            Start     Stop Route Frequency Ordered    08/17/22 0157  insulin aspart U-100 pen 0-5 Units         -- SubQ Before meals & nightly PRN 08/17/22 0101        Hold Oral hypoglycemics while patient is in the hospital.    Severe obesity (BMI 35.0-35.9 with comorbidity)  Body mass index is 35.37 kg/m². Morbid obesity complicates all aspects of disease management from diagnostic modalities to treatment. Weight loss encouraged and health benefits explained to patient.           VTE Risk Mitigation (From admission, onward)         Ordered     apixaban tablet 5 mg  2 times daily         08/17/22 0302     Reason for No Pharmacological VTE Prophylaxis  Once        Question:  Reasons:  Answer:  Already adequately anticoagulated on oral Anticoagulants    08/17/22 0101     IP VTE HIGH RISK PATIENT  Once         08/17/22 0101     Place sequential compression device  Until discontinued         08/17/22 0101                   Glenys Issa PA-C  Department of Hospital Medicine   [facility]

## 2022-08-17 NOTE — SUBJECTIVE & OBJECTIVE
Past Medical History:   Diagnosis Date    *Atrial flutter     Angina pectoris 2017    Anxiety     Arthritis     Asthma     Atrial fibrillation     Back pain     Cataract     OD    CHF (congestive heart failure)     Chronically on benzodiazepine therapy 2019    COPD (chronic obstructive pulmonary disease)     Depression     Diabetes mellitus     Emphysema of lung     Heart failure     Hepatomegaly 2/3/2016    Hernia     History of MI (myocardial infarction) 2016    Hypercapnic respiratory failure, chronic 2016    Hyperlipidemia     Hypertension     Iron deficiency anemia 2/3/2016    Myocardial infarction     Obesity     Peripheral vascular disease     Pneumonia     Polyneuropathy     Retinal detachment     OS    Septic shock 2017    Skin ulcer 3/18/2017    Tobacco dependence     Type II 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, not stated as uncontrolled(250.60)        Past Surgical History:   Procedure Laterality Date    BREAST BIOPSY Left 10 yrs ago    benign    CATARACT EXTRACTION Left     OS      SECTION      x2    EYE SURGERY      HYSTERECTOMY      partial    OOPHORECTOMY      one ovary conserved    RETINAL DETACHMENT SURGERY      buckle --OS    TONSILLECTOMY         Review of patient's allergies indicates:   Allergen Reactions    Dilaudid [hydromorphone] Anaphylaxis     Other reaction(s): Anaphylaxis  Other reaction(s): Unknown    Zyvox [linezolid in dextrose 5%] Shortness Of Breath       No current facility-administered medications on file prior to encounter.     Current Outpatient Medications on File Prior to Encounter   Medication Sig    ascorbic acid, vitamin C, (VITAMIN C) 500 MG tablet Take 2 tablets (1,000 mg total) by mouth every evening.    atorvastatin (LIPITOR) 10 MG tablet Take 1 tablet (10 mg total) by mouth every other day.    ELIQUIS 5 mg Tab TAKE 1 TABLET TWICE DAILY    empagliflozin (JARDIANCE) 10 mg tablet Take 1 tablet (10 mg  total) by mouth once daily.    fluticasone propionate (FLONASE) 50 mcg/actuation nasal spray SHAKE LIQUID AND USE 1 SPRAY(50 MCG) IN EACH NOSTRIL EVERY DAY    fluticasone-umeclidin-vilanter (TRELEGY ELLIPTA) 100-62.5-25 mcg DsDv INHALE 1 PUFF INTO THE LUNGS ONE TIME DAILY    furosemide (LASIX) 40 MG tablet TAKE 1 TABLET(40 MG) BY MOUTH EVERY DAY    gabapentin (NEURONTIN) 800 MG tablet Take 1 tablet (800 mg total) by mouth 3 (three) times daily.    HYDROcodone-acetaminophen (NORCO) 7.5-325 mg per tablet Take 1 tablet by mouth every 12 (twelve) hours as needed for Pain. Medical necessary for greater than 7 days for chronic pain. Hydrocodone is contraindicated with Klonopin.    levalbuterol (XOPENEX) 0.63 mg/3 mL nebulizer solution USE 1 VIAL VIA NEBULIZER FOUR TIMES DAILY    LIDOcaine (LIDODERM) 5 % APPLY PATCH ONTO SKIN.REMOVE AND DISCARD PATCH WITHIN 12 HOURS OR AS DIRECTED BY MD    metFORMIN (GLUCOPHAGE) 500 MG tablet TAKE 1 TABLET (500 MG TOTAL) BY MOUTH DAILY WITH BREAKFAST.    multivitamin (THERAGRAN) tablet Take 1 tablet by mouth once daily.    nystatin (NYSTOP) powder APPLY TOPICALLY TWICE DAILY    nystatin-triamcinolone (MYCOLOG II) cream Apply topically 3 (three) times daily. to affected area    ondansetron (ZOFRAN-ODT) 8 MG TbDL DISSOLVE 1 TABLET ON THE TONGUE EVERY 8 HOURS AS NEEDED    potassium chloride SA (K-DUR,KLOR-CON) 20 MEQ tablet Take 1 tablet (20 mEq total) by mouth once daily.    spironolactone (ALDACTONE) 50 MG tablet Take 1 tablet (50 mg total) by mouth once daily.     Family History       Problem Relation (Age of Onset)    Alcohol abuse Mother    Arthritis Father, Sister    Blindness Son    Cancer Brother    Crohn's disease Sister    Early death Sister (30)    Heart disease Father, Sister (32), Sister    No Known Problems Daughter          Tobacco Use    Smoking status: Current Every Day Smoker     Packs/day: 0.30     Years: 50.00     Pack years: 15.00     Types: Cigarettes     Start date:  1/19/1968     Last attempt to quit: 8/2/2019     Years since quitting: 3.0    Smokeless tobacco: Never Used   Substance and Sexual Activity    Alcohol use: No     Alcohol/week: 0.0 standard drinks    Drug use: No    Sexual activity: Not Currently     Review of Systems   Constitutional:  Positive for chills and fever. Negative for fatigue.   HENT:  Negative for congestion, sore throat and trouble swallowing.    Eyes:  Negative for visual disturbance.   Respiratory:  Negative for cough, shortness of breath and wheezing.    Gastrointestinal:  Positive for abdominal pain (resolved). Negative for abdominal distention, diarrhea, nausea and vomiting.   Genitourinary:  Negative for difficulty urinating, dysuria and hematuria.   Musculoskeletal:  Negative for back pain.   Skin:  Positive for rash (to abdomen).   Neurological:  Negative for dizziness, light-headedness and headaches.   Hematological:  Bruises/bleeds easily.   Psychiatric/Behavioral:  Negative for confusion.    Objective:     Vital Signs (Most Recent):  Temp: (!) 103.6 °F (39.8 °C) (08/16/22 2352)  Pulse: 108 (08/17/22 0001)  Resp: 18 (08/16/22 2131)  BP: 128/62 (08/17/22 0001)  SpO2: 95 % (08/17/22 0001)   Vital Signs (24h Range):  Temp:  [98.4 °F (36.9 °C)-104.4 °F (40.2 °C)] 103.6 °F (39.8 °C)  Pulse:  [] 108  Resp:  [18] 18  SpO2:  [93 %-96 %] 95 %  BP: (123-156)/(58-90) 128/62     Weight: 106.6 kg (235 lb)  Body mass index is 35.73 kg/m².    Physical Exam  Vitals and nursing note reviewed.   Constitutional:       General: She is not in acute distress.     Appearance: Normal appearance. She is obese. She is ill-appearing (chronically).   HENT:      Head: Normocephalic and atraumatic.      Mouth/Throat:      Mouth: Mucous membranes are moist.      Pharynx: Oropharynx is clear.   Eyes:      Extraocular Movements: Extraocular movements intact.      Pupils: Pupils are equal, round, and reactive to light.   Cardiovascular:      Rate and Rhythm: Regular  rhythm. Tachycardia present.      Pulses: Normal pulses.      Heart sounds: Normal heart sounds.   Pulmonary:      Effort: Pulmonary effort is normal. No respiratory distress.      Breath sounds: Decreased breath sounds present.   Abdominal:      General: Abdomen is flat. Bowel sounds are normal. There is no distension.      Palpations: Abdomen is soft.      Tenderness: There is no abdominal tenderness. There is no guarding or rebound.   Musculoskeletal:         General: Normal range of motion.      Cervical back: Normal range of motion and neck supple.   Skin:     General: Skin is warm.      Capillary Refill: Capillary refill takes 2 to 3 seconds.             Comments: Extensive cellulitis. Large area of erythema and warmth to left lower abdomen. Nontender to palpation. No fluctuance appreciated.    Neurological:      General: No focal deficit present.      Mental Status: She is alert and oriented to person, place, and time. Mental status is at baseline.   Psychiatric:         Mood and Affect: Mood normal.         Behavior: Behavior normal.         CRANIAL NERVES     CN III, IV, VI   Pupils are equal, round, and reactive to light.     Significant Labs: All pertinent labs within the past 24 hours have been reviewed.  CBC:   Recent Labs   Lab 08/16/22  2251   WBC 13.18*   HGB 14.4   HCT 45.4        CMP:   Recent Labs   Lab 08/16/22  2251      K 5.3*      CO2 27      BUN 24*   CREATININE 0.7   CALCIUM 10.0   PROT 8.3   ALBUMIN 4.0   BILITOT 0.5   ALKPHOS 125   AST 32   ALT 30   ANIONGAP 13     Lactic Acid:   Recent Labs   Lab 08/16/22  2251   LACTATE 2.1       Significant Imaging: I have reviewed all pertinent imaging results/findings within the past 24 hours.

## 2022-08-17 NOTE — ASSESSMENT & PLAN NOTE
"This patient does have evidence of infective focus  My overall impression is sepsis. Vital signs were reviewed and noted in progress note.  Antibiotics given-   Antibiotics (From admission, onward)            Start     Stop Route Frequency Ordered    08/17/22 1100  vancomycin 1.5 g in dextrose 5 % 250 mL IVPB (ready to mix)         -- IV Every 12 hours (non-standard times) 08/17/22 0329    08/17/22 0357  vancomycin - pharmacy to dose  (vancomycin IVPB)        "And" Linked Group Details    -- IV pharmacy to manage frequency 08/17/22 0257    08/16/22 2230  piperacillin-tazobactam 4.5 g in dextrose 5 % 100 mL IVPB (ready to mix system)  (ED Adult Sepsis Treatment)         08/17 2229 IV Every 8 hours (non-standard times) 08/16/22 2217        Cultures were taken-   Microbiology Results (last 7 days)     Procedure Component Value Units Date/Time    Blood culture x two cultures. Draw prior to antibiotics. [[id number]] Collected: 08/16/22 2250    Order Status: Sent Specimen: Blood Updated: 08/16/22 2251    Blood culture x two cultures. Draw prior to antibiotics. [[id number]] Collected: 08/16/22 2250    Order Status: Sent Specimen: Blood from Antecubital, Right Hand Updated: 08/16/22 2251        Latest lactate reviewed, they are-  Recent Labs   Lab 08/16/22 2251 08/17/22 0254   LACTATE 2.1 1.0         Source- skin/soft tissue    Source control Achieved by- IV abx.  Consulting Infectious Diseases specialist for opinion and recommendations.      "

## 2022-08-17 NOTE — PLAN OF CARE
POC reviewed with pt. Patient is alert and oriented x 4. DNR status. Continuous cardiac monitoring, TELE # [id number]- A Fib.   Rt AC 20g w NS infusing x 1 bag only. A-febrile throughout the shift. Meds given per MAR. Pt on 2 L NC. Blood glucose monitored. Chest Xray, UA and Ultrasound of abd done. Repositions self independently. Purposeful hourly/q2hr rounding done during shift to promote patient safety. NAD noted. Safety maintained with side rails up x3, bed wheels locked, bed in lowest positioned, call light in reach. Patient educated to call for assistance with ambulation if needed. Pt remains free of falls. Will continue to monitor.

## 2022-08-17 NOTE — ASSESSMENT & PLAN NOTE
Body mass index is 35.37 kg/m². Morbid obesity complicates all aspects of disease management from diagnostic modalities to treatment. Weight loss encouraged and health benefits explained to patient.

## 2022-08-17 NOTE — ASSESSMENT & PLAN NOTE
Patient is identified as having Combined Systolic and Diastolic heart failure that is Chronic. CHF is currently controlled. Latest ECHO performed and demonstrates- Results for orders placed during the hospital encounter of 03/27/22    Echo    Interpretation Summary  · The left ventricle is severely enlarged with eccentric hypertrophy and severely decreased systolic function.  · The estimated ejection fraction is 25%.  · Moderate-to-severe mitral regurgitation.  · Mild right atrial enlargement.  · The estimated PA systolic pressure is 34 mmHg.  · Moderate left atrial enlargement.  · Mild pulmonic regurgitation.  · Mild tricuspid regurgitation.  · Normal right ventricular size with moderately reduced right ventricular systolic function.  · Atrial fibrillation observed with restrictive filling pattern present.  · Unchanged from prior  . Continue Furosemide Aldactone and monitor clinical status closely. Monitor on telemetry. Patient is off CHF pathway.  Monitor strict Is&Os and daily weights.  Place on fluid restriction of 2 L. Continue to stress to patient importance of self efficacy and  on diet for CHF. Last BNP reviewed- and noted below No results for input(s): BNP, BNPTRIAGEBLO in the last 168 hours..

## 2022-08-17 NOTE — NURSING
Nurses Note -- 4 Eyes      8/17/2022   3:52 AM      Skin assessed during: Admit      [x] No Pressure Injuries Present    []Prevention Measures Documented      [x] Yes- Altered Skin Integrity Present or Discovered   [] LDA Added if Not in Epic (Describe Wound)   [x] New Altered Skin Integrity was Present on Admit and Documented in LDA   [x] Wound Image Taken    Wound Care Consulted? No    Attending Nurse:  NATALIO DRUMMOND RN     Second RN/Staff Member:  Cindy Real LPN

## 2022-08-17 NOTE — PLAN OF CARE
Plan of care reviewed with patient, patient states understanding. Patient A&Ox4. Patients IV clean, dry, intact and infusing NS @ 75mL/hr. Patient on O2@ 2L via NC. No complaints throughout night. Bed in low, locked position with call button within reach.

## 2022-08-17 NOTE — HPI
Nelly Tian is a 71 y.o. y.o. female with a PMHx of A-fib, CHF, COPD, T2DM, CHF, COPD, and septic shock with numerous episodes of abdominal cellulitis who presented to the ED with abdominal cellulitis and abdominal pain onset x 1 day. She described lower abdominal pain as a sharp sensation that did not radiate. Denies alleviating or exacerbating factors. Pain was 10/10 at its worst and denies pain currently. She reports Hx of cellulitis to abdomen and states this is similar to previous episodes. She also reports fever and chills. She denies fatigue, CP, palpitations, SOB, cough, n/v/d, and changes in urination. ED workup was significant for elevated WBC, fever, and mild hyperkalemia. Hospital Medicine was consulted for further workup and management of the patient.

## 2022-08-17 NOTE — ASSESSMENT & PLAN NOTE
Chronic, controlled.  Latest blood pressure and vitals reviewed-   Temp:  [98.3 °F (36.8 °C)-104.4 °F (40.2 °C)]   Pulse:  []   Resp:  [16-18]   BP: (118-156)/(58-90)   SpO2:  [92 %-96 %] .   Home meds for hypertension were reviewed and noted below.   Hypertension Medications             furosemide (LASIX) 40 MG tablet TAKE 1 TABLET(40 MG) BY MOUTH EVERY DAY    spironolactone (ALDACTONE) 50 MG tablet Take 1 tablet (50 mg total) by mouth once daily.          While in the hospital, will manage blood pressure as follows; Adjust home antihypertensive regimen as follows- hold spironolactone givenmild hyperkalemia    Will utilize p.r.n. blood pressure medication only if patient's blood pressure greater than  180/110 and she develops symptoms such as worsening chest pain or shortness of breath.

## 2022-08-17 NOTE — PLAN OF CARE
[facility]  Initial Discharge Assessment       Primary Care Provider: Constantine Bird MD    Admission Diagnosis: Sepsis [A41.9]    Admission Date: 8/16/2022  Expected Discharge Date:   Pt confirmed home address and lives with Daughter Maye Tirado [phone] or [phone]. Pt denied HH and has home walker and home O2 at 2 L . Pts PCP is Dr. Bird and she has [employer] medicare. Pts pharmacy of choice is [facility]. Pts daughter can provide transport home. CM following for additional needs.   Discharge Barriers Identified: None    Payor: [employer] MANAGED MEDICARE / Plan: [employer] MEDICARE PPO / Product Type: Medicare Advantage /     Extended Emergency Contact Information  Primary Emergency Contact: Maye Tirado  Address: [address] [place] [address]           [place] [address] [facility]  Home Phone: [phone]  Work Phone: [phone]  Mobile Phone: [phone]  Relation: Daughter  Preferred language: English   needed? No  Secondary Emergency Contact: Sharda Finn  Mobile Phone: [phone]  Relation: Friend  Preferred language: English   needed? No    Discharge Plan A: Home with family  Discharge Plan B: Home with family, Home Health      [id number] - [place] - [address]  AT [facility] [address]  [address]  [place] [address]  Phone: [phone] Fax: [phone]    [facility] (Now [facility]) - [facility] [address]  [address] [facility] [address] [address]  Phone: [phone] Fax: [phone]    [id number] - [place] - [address] & [facility]  [address]   [place] [address]  Phone: [phone] Fax: [phone]    [id number] - [place] - [address] [facility] [address]  [address] [place] [address]  Phone: [phone] Fax:  [phone]      Initial Assessment (most recent)     Adult Discharge Assessment - 08/17/22 1156        Discharge Assessment    Assessment Type Discharge Planning Assessment     Confirmed/corrected address, phone number and insurance Yes     Confirmed Demographics Correct on Facesheet     Source of Information patient     Reason For Admission sepsis     Lives With child(bonny), adult     Facility Arrived From: home     Do you expect to return to your current living situation? Yes     Do you have help at home or someone to help you manage your care at home? Yes     Who are your caregiver(s) and their phone number(s)? Arnav Tirado [phone] or [phone]     Prior to hospitilization cognitive status: Alert/Oriented     Current cognitive status: Alert/Oriented     Walking or Climbing Stairs Difficulty ambulation difficulty, requires equipment     Mobility Management walker     Dressing/Bathing Difficulty none     Home Layout Able to live on 1st floor     Equipment Currently Used at Home walker, rolling;oxygen     Readmission within 30 days? No     Patient currently being followed by outpatient case management? No     Do you currently have service(s) that help you manage your care at home? No     Do you take prescription medications? Yes     Do you have prescription coverage? Yes     Do you have any problems affording any of your prescribed medications? No     Is the patient taking medications as prescribed? yes     Who is going to help you get home at discharge? Arnav Tirado [phone] or [phone]     How do you get to doctors appointments? car, drives self;family or friend will provide     Are you on dialysis? No     Do you take coumadin? No     Discharge Plan A Home with family     Discharge Plan B Home with family;Home Health     DME Needed Upon Discharge  none     Discharge Plan discussed with: Patient     Discharge Barriers Identified None        Relationship/Environment    Name(s) of Who  Lives With Patient Daughter Maye Tirado [phone] or [phone]

## 2022-08-17 NOTE — ASSESSMENT & PLAN NOTE
This patient does have evidence of infective focus  My overall impression is sepsis. Vital signs were reviewed and noted in progress note.  Antibiotics given-   Antibiotics (From admission, onward)            Start     Stop Route Frequency Ordered    08/16/22 2230  piperacillin-tazobactam 4.5 g in dextrose 5 % 100 mL IVPB (ready to mix system)  (ED Adult Sepsis Treatment)         08/17 2229 IV Every 8 hours (non-standard times) 08/16/22 2217        Cultures were taken-   Microbiology Results (last 7 days)     Procedure Component Value Units Date/Time    Blood culture x two cultures. Draw prior to antibiotics. [[id number]] Collected: 08/16/22 2250    Order Status: Sent Specimen: Blood Updated: 08/16/22 2251    Blood culture x two cultures. Draw prior to antibiotics. [[id number]] Collected: 08/16/22 2250    Order Status: Sent Specimen: Blood from Antecubital, Right Hand Updated: 08/16/22 2251        Latest lactate reviewed, they are-  Recent Labs   Lab 08/16/22 2251   LACTATE 2.1         Source- skin/soft tissue    Source control Achieved by- IV abx

## 2022-08-17 NOTE — CONSULTS
"Consult Note  Infectious Disease    Reason for Consult:      HPI: Nelly Tian is a 71 y.o. female  whom I had seen in May of 2019 with anterior abdominal wall cellulitis, she has a PMHx of obesity , BMI of 35.3, A-fib, CAD, MI, HTN, CHF, COPD, T2DM, COPD, the anterior abdominal wall cellulitis, comes back with the same.    She was in her usual state of health until a day POA.  She developed severe shaking chills, feeling hot and cold and she knew that her abdominal wall cellulitis was coming back. She noticed redness, warmth, and pain over the lower abdomen. Pain was severe, 7/10, she had an impressive temperature of 104°.  She was admitted and placed on vancomycin    Ultrasound of lower abdomen shows no fluid collection abscess.  She going to have NSAIDs because for she had received Tdap in May 2019.    I came and saw patient.  She is frustrated because she did not eat today as she had an ultrasound.  I resumed diet.  She also had some headache and was bleeding from Tylenol.        Antibiotics (From admission, onward)            Start     Stop Route Frequency Ordered    08/17/22 1100  vancomycin 1.5 g in dextrose 5 % 250 mL IVPB (ready to mix)         -- IV Every 12 hours (non-standard times) 08/17/22 0329    08/17/22 0357  vancomycin - pharmacy to dose  (vancomycin IVPB)        "And" Linked Group Details    -- IV pharmacy to manage frequency 08/17/22 0257        Antifungals (From admission, onward)            None        Antivirals (From admission, onward)    None            Review of patient's allergies indicates:   Allergen Reactions    Dilaudid [hydromorphone] Anaphylaxis     Other reaction(s): Anaphylaxis  Other reaction(s): Unknown    Zyvox [linezolid in dextrose 5%] Shortness Of Breath     Past Medical History:   Diagnosis Date    *Atrial flutter     Angina pectoris 9/18/2017    Anxiety     Arthritis     Asthma     Atrial fibrillation     Back pain     Cataract     OD    CHF (congestive " heart failure)     Chronically on benzodiazepine therapy 2019    COPD (chronic obstructive pulmonary disease)     Depression     Diabetes mellitus     Emphysema of lung     Heart failure     Hepatomegaly 2/3/2016    Hernia     History of MI (myocardial infarction) 2016    Hypercapnic respiratory failure, chronic 2016    Hyperlipidemia     Hypertension     Iron deficiency anemia 2/3/2016    Myocardial infarction     Obesity     Peripheral vascular disease     Pneumonia     Polyneuropathy     Retinal detachment     OS    Septic shock 2017    Skin ulcer 3/18/2017    Tobacco dependence     Type II 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, not stated as uncontrolled(250.60)      Past Surgical History:   Procedure Laterality Date    BREAST BIOPSY Left 10 yrs ago    benign    CATARACT EXTRACTION Left     OS      SECTION      x2    EYE SURGERY      HYSTERECTOMY      partial    OOPHORECTOMY      one ovary conserved    RETINAL DETACHMENT SURGERY      buckle --OS    TONSILLECTOMY       Social History     Socioeconomic History    Marital status:    Tobacco Use    Smoking status: Current Every Day Smoker     Packs/day: 0.30     Years: 50.00     Pack years: 15.00     Types: Cigarettes     Start date: 1968     Last attempt to quit: 2019     Years since quitting: 3.0    Smokeless tobacco: Never Used   Substance and Sexual Activity    Alcohol use: No     Alcohol/week: 0.0 standard drinks    Drug use: No    Sexual activity: Not Currently     Social Determinants of Health     Financial Resource Strain: Medium Risk    Difficulty of Paying Living Expenses: Somewhat hard   Food Insecurity: Food Insecurity Present    Worried About Running Out of Food in the Last Year: Sometimes true     Family History   Problem Relation Age of Onset    Arthritis Father     Heart disease Father     Early death Sister 30        heart disease     Heart disease Sister 32        MI    Cancer Brother         Lung cancer    No Known Problems Daughter     Blindness Son         due to accident//    Arthritis Sister     Heart disease Sister     Crohn's disease Sister     Alcohol abuse Mother     Breast cancer Neg Hx     Glaucoma Neg Hx     Macular degeneration Neg Hx     Retinal detachment Neg Hx     Amblyopia Neg Hx     Diabetes Neg Hx     Cataracts Neg Hx     Hypertension Neg Hx     Strabismus Neg Hx     Stroke Neg Hx     Thyroid disease Neg Hx          Review of Systems:   + chills, fever, sweats, weight loss  No change in vision, loss of vision or diplopia  No sinus congestion, purulent nasal discharge, post nasal drip or facial pain  No pain in mouth or throat. No problems with teeth, gums.  No chest pain, palpitations, syncope  No cough, sputum production, shortness of breath, dyspnea on exertion, pleurisy, hemoptysis  No nausea, vomiting, diarrhea, constipation, blood in stool, or focal abd pain,  No dysphagia, odynophagia  No dysuria, hematuria, strangury, retention, incontinence, nocturia, prostatism,   No vaginal discharge, genital ulcers, risk for STD  No swelling of joints, redness of joints, injuries, or new focal pain  No unusual headaches, dizziness, vertigo, numbness, paresthesias, neuropathy, falls  No anxiety, depression, substance abuse, sleep disturbance  + diabetes,  No thyroid, hypogonadal conditions  No bleeding, lymphadenopathy, anemia, malignancy, unusual bruising  No new rashes, lesions, or wounds  No TB exposure  No recent/prior steroids  Outdoor activities:  Travel: no  Implants: no  Antibiotic History: no    EXAM & DIAGNOSTICS REVIEWED:   Vitals:     Temp:  [98.3 °F (36.8 °C)-104.4 °F (40.2 °C)]   Temp: 98.8 °F (37.1 °C) (08/17/22 1500)  Pulse: (!) 51 (08/17/22 1500)  Resp: 18 (08/17/22 1500)  BP: (!) 121/57 (08/17/22 1500)  SpO2: (!) 93 % (08/17/22 1500)    Intake/Output Summary (Last 24 hours) at 8/17/2022  1900  Last data filed at 8/17/2022 1100  Gross per 24 hour   Intake 1100 ml   Output 700 ml   Net 400 ml       General:  In NAD. Alert and attentive, cooperative, comfortable  Eyes:  Anicteric, PERRL, EOMI  ENT:  No ulcers, exudates, thrush, nares patent, dentition is fair  Neck:  supple, no masses or adenopathy appreciated  Lungs: Clear, no consolidation, rales, wheezes, rub  Heart:  RRR, no gallop/murmur/rub noted  Abd:  Soft, NT, ND, normal BS, no masses or organomegaly appreciated.  :  Voids/Harding, urine clear, no flank tenderness  Musc:  Joints without effusion, swelling, erythema, synovitis, muscle wasting.   Skin:  + cellulitis to anterior abdomen; + yeast rash to groin  rashes. No palmar or plantar lesions. No subungual petechiae  Neuro:   Alert, attentive, speech fluent, face symmetric, moves all extremities, no focal weakness. Ambulatory  Psych: Calm, cooperative  Lymphatic:     No cervical, supraclavicular, axillary, or inguinal nodes  Extrem: No edema, erythema, phlebitis, cellulitis, warm and well perfused  VAD:       Isolation:    Wound:               Lines/Tubes/Drains:    General Labs reviewed:  Recent Labs   Lab 08/16/22 2251 08/17/22  0254   WBC 13.18* 19.99*   HGB 14.4 13.5   HCT 45.4 42.8    184       Recent Labs   Lab 08/16/22 2251 08/17/22  0254    136   K 5.3* 4.4    102   CO2 27 21*   BUN 24* 24*   CREATININE 0.7 0.7   CALCIUM 10.0 9.2   PROT 8.3 7.2   BILITOT 0.5 0.8   ALKPHOS 125 103   ALT 30 27   AST 32 29     No results for input(s): CRP in the last 168 hours.      Micro:  Microbiology Results (last 7 days)     Procedure Component Value Units Date/Time    Blood culture x two cultures. Draw prior to antibiotics. [[id number]] Collected: 08/16/22 2250    Order Status: Completed Specimen: Blood Updated: 08/17/22 1715     Blood Culture, Routine No Growth to date    Narrative:      Aerobic and anaerobic    Blood culture x two cultures. Draw prior to antibiotics.  [[id number]] Collected: 08/16/22 3750    Order Status: Completed Specimen: Blood from Antecubital, Right Hand Updated: 08/17/22 3026     Blood Culture, Routine No Growth to date    Narrative:      Aerobic and anaerobic          Imaging Reviewed:   CXR   CT    Cardiology:    IMPRESSION & PLAN     1. Severe anterior abdominal wall cellulitis.    2. PMHx of obesity , BMI of 35.3, A-fib, CAD, MI, HTN, CHF, COPD, T2DM,A1c 5.1 COPD,    This patient is high risk for life-threatening deterioration and death secondary to above comorbidities and need for IV treatment    Recommendations:  Continue vancomycin  Add Clindamycin p.o.  She is up-to-date with tetanus vaccine  I cannot give NSAIDs to help with inflammation,  but I am hoping clindamycin will do  Probiotic    A few doses of Diflucan for yeast in bilateral groin which I believe has precipitated the cellulitis     Medical Decision Making during this encounter was  [_] Low Complexity  [_] Moderate Complexity  [ xx ] High Complexity

## 2022-08-17 NOTE — ASSESSMENT & PLAN NOTE
Advance Care Planning     Date: 08/17/2022    Code Status  In light of the patients advanced and life limiting illness,I engaged the the patient in a conversation about the patient's preferences for care  at the very end of life. The patient wishes to have a natural, peaceful death.  Along those lines, the patient does not wish to have CPR or other invasive treatments performed when her heart and/or breathing stops. I communicated to the patient that a DNR order would be placed in her medical record to reflect this preference.  I spent a total of 20 minutes engaging the patient in this advance care planning discussion.

## 2022-08-17 NOTE — ED PROVIDER NOTES
Encounter Date: 8/16/2022       History     Chief Complaint   Patient presents with    Skin Ulcer     Pt has an wound on her stomach that appeared today.     Chills     71-year-old female with a history of AFib, CHF, COPD, diabetes, MI, peripheral vascular disease, septic shock with numerous episodes of abdominal cellulitis presents with another episode of abdominal cellulitis.  Discomfort in the abdominal wall began today accompanied by fever and chills at home.  She did not measure her temperature.  She is nauseous.  This is consistent with numerous prior episodes of this.    The history is provided by the patient.     Review of patient's allergies indicates:   Allergen Reactions    Dilaudid [hydromorphone] Anaphylaxis     Other reaction(s): Anaphylaxis  Other reaction(s): Unknown    Zyvox [linezolid in dextrose 5%] Shortness Of Breath     Past Medical History:   Diagnosis Date    *Atrial flutter     Angina pectoris 9/18/2017    Anxiety     Arthritis     Asthma     Atrial fibrillation     Back pain     Cataract     OD    CHF (congestive heart failure)     Chronically on benzodiazepine therapy 5/4/2019    COPD (chronic obstructive pulmonary disease)     Depression     Diabetes mellitus     Emphysema of lung     Heart failure     Hepatomegaly 2/3/2016    Hernia     History of MI (myocardial infarction) 1/19/2016    Hypercapnic respiratory failure, chronic 11/16/2016    Hyperlipidemia     Hypertension     Iron deficiency anemia 2/3/2016    Myocardial infarction     Obesity     Peripheral vascular disease     Pneumonia     Polyneuropathy     Retinal detachment     OS    Septic shock 4/23/2017    Skin ulcer 3/18/2017    Tobacco dependence     Type II 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, not stated as uncontrolled(250.60)      Past Surgical History:   Procedure Laterality Date    BREAST BIOPSY Left 10 yrs ago    benign    CATARACT EXTRACTION Left      OS      SECTION      x2    EYE SURGERY      HYSTERECTOMY      partial    OOPHORECTOMY      one ovary conserved    RETINAL DETACHMENT SURGERY      buckle --OS    TONSILLECTOMY       Family History   Problem Relation Age of Onset    Arthritis Father     Heart disease Father     Early death Sister 30        heart disease    Heart disease Sister 32        MI    Cancer Brother         Lung cancer    No Known Problems Daughter     Blindness Son         due to accident//    Arthritis Sister     Heart disease Sister     Crohn's disease Sister     Alcohol abuse Mother     Breast cancer Neg Hx     Glaucoma Neg Hx     Macular degeneration Neg Hx     Retinal detachment Neg Hx     Amblyopia Neg Hx     Diabetes Neg Hx     Cataracts Neg Hx     Hypertension Neg Hx     Strabismus Neg Hx     Stroke Neg Hx     Thyroid disease Neg Hx      Social History     Tobacco Use    Smoking status: Current Every Day Smoker     Packs/day: 0.30     Years: 50.00     Pack years: 15.00     Types: Cigarettes     Start date: 1968     Last attempt to quit: 2019     Years since quitting: 3.0    Smokeless tobacco: Never Used   Substance Use Topics    Alcohol use: No     Alcohol/week: 0.0 standard drinks    Drug use: No     Review of Systems   Constitutional: Positive for chills, fatigue and fever.   Gastrointestinal: Positive for nausea.   Skin: Positive for rash.   All other systems reviewed and are negative.      Physical Exam     Initial Vitals [22 2131]   BP Pulse Resp Temp SpO2   (!) 156/90 92 18 98.4 °F (36.9 °C) 96 %      MAP       --         Physical Exam    Nursing note and vitals reviewed.  Constitutional: She appears well-developed and well-nourished.   Rigors, nauseated   HENT:   Head: Normocephalic and atraumatic.   Eyes: EOM are normal.   Neck: Neck supple.   Normal range of motion.  Cardiovascular: Normal rate, regular rhythm and normal heart sounds. Exam reveals no gallop and no  friction rub.    No murmur heard.  Pulmonary/Chest: Breath sounds normal. No respiratory distress. She has no wheezes. She has no rhonchi. She has no rales.   Abdominal: Abdomen is soft. She exhibits no distension. There is no abdominal tenderness. There is no rebound and no guarding.   Musculoskeletal:         General: Normal range of motion.      Cervical back: Normal range of motion and neck supple.     Neurological: She is alert and oriented to person, place, and time.   Skin: Skin is warm and dry.   There is a very large area of lower abdominal cellulitis on her pannus.  This is extensive and involves most of the lower left abdomen   Psychiatric: She has a normal mood and affect. Her behavior is normal. Judgment and thought content normal.         ED Course   Critical Care    Date/Time: 8/17/2022 12:34 PM  Performed by: Suresh Marie MD  Authorized by: Ree Corrales MD   Direct patient critical care time: 25 minutes  Additional history critical care time: 10 minutes  Ordering / reviewing critical care time: 10 minutes  Documentation critical care time: 8 minutes  Consulting other physicians critical care time: 4 minutes  Total critical care time (exclusive of procedural time) : 57 minutes  Critical care was necessary to treat or prevent imminent or life-threatening deterioration of the following conditions: sepsis.  Critical care was time spent personally by me on the following activities: development of treatment plan with patient or surrogate, discussions with consultants, evaluation of patient's response to treatment, examination of patient, obtaining history from patient or surrogate, ordering and performing treatments and interventions, ordering and review of laboratory studies, pulse oximetry, re-evaluation of patient's condition and review of old charts.        Labs Reviewed   CBC W/ AUTO DIFFERENTIAL - Abnormal; Notable for the following components:       Result Value    WBC 13.18 (*)     MCHC 31.7  (*)     Gran # (ANC) 10.2 (*)     Immature Grans (Abs) 0.05 (*)     Mono # 1.4 (*)     Gran % 77.1 (*)     Lymph % 9.9 (*)     All other components within normal limits   COMPREHENSIVE METABOLIC PANEL - Abnormal; Notable for the following components:    Potassium 5.3 (*)     BUN 24 (*)     All other components within normal limits   SARS-COV-2 RNA AMPLIFICATION, QUAL   LACTIC ACID, PLASMA          Imaging Results    None          Medications   piperacillin-tazobactam 4.5 g in dextrose 5 % 100 mL IVPB (ready to mix system) (0 g Intravenous Stopped 8/17/22 0617)   ibuprofen tablet 600 mg (600 mg Oral Given 8/17/22 0031)   sodium chloride 0.9% flush 3 mL (has no administration in time range)   melatonin tablet 9 mg (has no administration in time range)   ondansetron injection 4 mg (has no administration in time range)   senna-docusate 8.6-50 mg per tablet 1 tablet (1 tablet Oral Given 8/17/22 0910)   acetaminophen tablet 650 mg (has no administration in time range)   naloxone 0.4 mg/mL injection 0.02 mg (has no administration in time range)   potassium bicarbonate disintegrating tablet 50 mEq (has no administration in time range)   potassium bicarbonate disintegrating tablet 35 mEq (has no administration in time range)   potassium bicarbonate disintegrating tablet 60 mEq (has no administration in time range)   magnesium oxide tablet 800 mg (has no administration in time range)   magnesium oxide tablet 800 mg (has no administration in time range)   glucose chewable tablet 16 g (has no administration in time range)   glucose chewable tablet 24 g (has no administration in time range)   glucagon (human recombinant) injection 1 mg (has no administration in time range)   insulin aspart U-100 pen 0-5 Units (has no administration in time range)   vancomycin - pharmacy to dose (has no administration in time range)   atorvastatin tablet 10 mg (10 mg Oral Given 8/17/22 0911)   apixaban tablet 5 mg (5 mg Oral Given 8/17/22 0911)    furosemide tablet 40 mg (40 mg Oral Given 8/17/22 0910)   multivitamin tablet (1 tablet Oral Given 8/17/22 0910)   gabapentin capsule 800 mg (has no administration in time range)   HYDROcodone-acetaminophen 7.5-325 mg per tablet 1 tablet (1 tablet Oral Given 8/17/22 1039)   vancomycin 1.5 g in dextrose 5 % 250 mL IVPB (ready to mix) (1,500 mg Intravenous Trough Due As Scheduled Before Dose 8/18/22 1000)   dextrose 10% bolus 125 mL (has no administration in time range)   dextrose 10% bolus 250 mL (has no administration in time range)   vancomycin (VANCOCIN) 2,000 mg in dextrose 5 % 500 mL IVPB (0 mg Intravenous Stopped 8/17/22 0055)   acetaminophen tablet 1,000 mg (1,000 mg Oral Given 8/16/22 2240)   sodium chloride 0.9% bolus 500 mL (0 mLs Intravenous Stopped 8/16/22 2347)   ondansetron injection 8 mg (8 mg Intravenous Given 8/16/22 2251)   0.9%  NaCl infusion ( Intravenous New Bag 8/17/22 0207)                 ED Course as of 08/17/22 1234   Tue Aug 16, 2022   2205 SpO2: 96 % [EF]   2205 Resp: 18 [EF]   2205 Pulse: 92 [EF]   2205 Temp src: Oral [EF]   2205 Temp: 98.4 °F (36.9 °C) [EF]   2205 BP(!): 156/90 [EF]   2235 Temp(!): 104.4 °F (40.2 °C) [EF]   2319 WBC(!): 13.18 [EF]   2319 Hemoglobin: 14.4 [EF]   2320 Platelets: 184 [EF]   2322 Glenys with HM to admit [EF]      ED Course User Index  [EF] Suresh Marie MD             Clinical Impression:   Final diagnoses:  [A41.9] Sepsis          ED Disposition Condition    Observation                 71-year-old female with numerous episodes of abdominal cellulitis and panniculitis presents to the emergency room with a large area of erythema and calor on her abdominal wall and very high rectal temp with a white count.  She is ill-appearing with rigors and nausea.  Covered with broad-spectrum antibiotics in the emergency room.  She will be admitted to Hospital Medicine.     Suresh Marie MD  08/17/22 0390

## 2022-08-17 NOTE — ASSESSMENT & PLAN NOTE
Acute on chronic:  - IV abx  - US abdomen ordered  - gentle IVFH  -  Lactic acid within normal limits.

## 2022-08-17 NOTE — ASSESSMENT & PLAN NOTE
Chronic, controlled.  Latest blood pressure and vitals reviewed-   Temp:  [98.4 °F (36.9 °C)-104.4 °F (40.2 °C)]   Pulse:  []   Resp:  [16-18]   BP: (118-156)/(58-90)   SpO2:  [93 %-96 %] .   Home meds for hypertension were reviewed and noted below.   Hypertension Medications             furosemide (LASIX) 40 MG tablet TAKE 1 TABLET(40 MG) BY MOUTH EVERY DAY    spironolactone (ALDACTONE) 50 MG tablet Take 1 tablet (50 mg total) by mouth once daily.          While in the hospital, will manage blood pressure as follows; Adjust home antihypertensive regimen as follows- hold spironolactone givenmild hyperkalemia    Will utilize p.r.n. blood pressure medication only if patient's blood pressure greater than  180/110 and she develops symptoms such as worsening chest pain or shortness of breath.

## 2022-08-17 NOTE — ASSESSMENT & PLAN NOTE
Patient's FSGs are controlled on current medication regimen.  Last A1c reviewed-   Lab Results   Component Value Date    HGBA1C 5.1 07/22/2022     Most recent fingerstick glucose reviewed-   Recent Labs   Lab 08/17/22  0753   POCTGLUCOSE 115*     Current correctional scale  Low  Maintain anti-hyperglycemic dose as follows-   Antihyperglycemics (From admission, onward)            Start     Stop Route Frequency Ordered    08/17/22 0157  insulin aspart U-100 pen 0-5 Units         -- SubQ Before meals & nightly PRN 08/17/22 0101        Hold Oral hypoglycemics while patient is in the hospital.

## 2022-08-17 NOTE — ASSESSMENT & PLAN NOTE
Patient's FSGs are controlled on current medication regimen.  Last A1c reviewed-   Lab Results   Component Value Date    HGBA1C 5.1 07/22/2022     Most recent fingerstick glucose reviewed- No results for input(s): POCTGLUCOSE in the last 24 hours.  Current correctional scale  Low  Maintain anti-hyperglycemic dose as follows-   Antihyperglycemics (From admission, onward)            Start     Stop Route Frequency Ordered    08/17/22 0157  insulin aspart U-100 pen 0-5 Units         -- SubQ Before meals & nightly PRN 08/17/22 0101        Hold Oral hypoglycemics while patient is in the hospital.

## 2022-08-17 NOTE — UM SECONDARY REVIEW
Patient does not meet inpt criteria at this time, however Dr Corrales wants patient inpatient.  Dr Corrales is in agreement to peer to peer if needed.

## 2022-08-17 NOTE — PROGRESS NOTES
[facility] Hospital Medicine  Progress Note    Patient Name: Nelly Tian  MRN: [id number]  Patient Class: IP- Inpatient   Admission Date: 8/16/2022  Length of Stay: 0 days  Attending Physician: Ree Corrales MD  Primary Care Provider: Constantine Bird MD        Subjective:     Principal Problem:Sepsis        HPI:  Nelly Tian is a 71 y.o. y.o. female with a PMHx of A-fib, CHF, COPD, T2DM, CHF, COPD, and septic shock with numerous episodes of abdominal cellulitis who presented to the ED with abdominal cellulitis and abdominal pain onset x 1 day. She described lower abdominal pain as a sharp sensation that did not radiate. Denies alleviating or exacerbating factors. Pain was 10/10 at its worst and denies pain currently. She reports Hx of cellulitis to abdomen and states this is similar to previous episodes. She also reports fever and chills. She denies fatigue, CP, palpitations, SOB, cough, n/v/d, and changes in urination. ED workup was significant for elevated WBC, fever, and mild hyperkalemia. Hospital Medicine was consulted for further workup and management of the patient.           Overview/Hospital Course:  No notes on file    Interval History:   T-max 104.4 degree F.  Leukocytosis worsening  19.9 K. complaining of abdominal pain with increasing redness, pain and tenderness of lower left abdomen skin.  Denies any chest pain or shortness of breath.  Awaiting abdominal ultrasound, currently NPO.    Review of Systems   Constitutional:  Positive for chills and fever. Negative for fatigue.   HENT:  Negative for congestion, sore throat and trouble swallowing.    Eyes:  Negative for visual disturbance.   Respiratory:  Negative for cough, shortness of breath and wheezing.    Gastrointestinal:  Positive for abdominal pain (resolved). Negative for abdominal distention, diarrhea, nausea and vomiting.   Genitourinary:  Negative for difficulty urinating, dysuria and hematuria.    Musculoskeletal:  Negative for back pain.   Skin:  Positive for rash (to abdomen).   Neurological:  Negative for dizziness, light-headedness and headaches.   Hematological:  Bruises/bleeds easily.   Psychiatric/Behavioral:  Negative for confusion.    Objective:     Vital Signs (Most Recent):  Temp: 98.3 °F (36.8 °C) (08/17/22 0813)  Pulse: (!) 52 (08/17/22 0813)  Resp: 16 (08/17/22 0813)  BP: (!) 127/58 (08/17/22 0813)  SpO2: (!) 92 % (08/17/22 0813)   Vital Signs (24h Range):  Temp:  [98.3 °F (36.8 °C)-104.4 °F (40.2 °C)] 98.3 °F (36.8 °C)  Pulse:  [] 52  Resp:  [16-18] 16  SpO2:  [92 %-96 %] 92 %  BP: (118-156)/(58-90) 127/58     Weight: 105.5 kg (232 lb 9.4 oz)  Body mass index is 35.36 kg/m².    Intake/Output Summary (Last 24 hours) at 8/17/2022 0844  Last data filed at 8/17/2022 0055  Gross per 24 hour   Intake 1100 ml   Output --   Net 1100 ml      Physical Exam  Vitals and nursing note reviewed.   Constitutional:       General: She is not in acute distress.     Appearance: Normal appearance. She is obese. She is ill-appearing (chronically).   HENT:      Head: Normocephalic and atraumatic.      Mouth/Throat:      Mouth: Mucous membranes are moist.      Pharynx: Oropharynx is clear.   Eyes:      Extraocular Movements: Extraocular movements intact.      Pupils: Pupils are equal, round, and reactive to light.   Cardiovascular:      Rate and Rhythm: Regular rhythm. Tachycardia present.      Pulses: Normal pulses.      Heart sounds: Normal heart sounds.   Pulmonary:      Effort: Pulmonary effort is normal. No respiratory distress.      Breath sounds: Decreased breath sounds present.   Abdominal:      General: Abdomen is flat. Bowel sounds are normal. There is no distension.      Palpations: Abdomen is soft.      Tenderness: There is no abdominal tenderness. There is no guarding or rebound.   Musculoskeletal:         General: Normal range of motion.      Cervical back: Normal range of motion and neck supple.    Skin:     General: Skin is warm.      Capillary Refill: Capillary refill takes 2 to 3 seconds.             Comments: Extensive cellulitis. Large area of erythema and warmth to left lower abdomen. Nontender to palpation. No fluctuance appreciated.    Neurological:      General: No focal deficit present.      Mental Status: She is alert and oriented to person, place, and time. Mental status is at baseline.   Psychiatric:         Mood and Affect: Mood normal.         Behavior: Behavior normal.       Significant Labs: All pertinent labs within the past 24 hours have been reviewed.  CBC:   Recent Labs   Lab 08/16/22 2251 08/17/22 0254   WBC 13.18* 19.99*   HGB 14.4 13.5   HCT 45.4 42.8    184     CMP:   Recent Labs   Lab 08/16/22 2251 08/17/22 0254    136   K 5.3* 4.4    102   CO2 27 21*    118*   BUN 24* 24*   CREATININE 0.7 0.7   CALCIUM 10.0 9.2   PROT 8.3 7.2   ALBUMIN 4.0 3.5   BILITOT 0.5 0.8   ALKPHOS 125 103   AST 32 29   ALT 30 27   ANIONGAP 13 13     Lactic Acid:   Recent Labs   Lab 08/16/22 2251 08/17/22 0254   LACTATE 2.1 1.0     Lipase: No results for input(s): LIPASE in the last 48 hours.  Urine Culture: No results for input(s): LABURIN in the last 48 hours.  Urine Studies: No results for input(s): COLORU, APPEARANCEUA, PHUR, SPECGRAV, PROTEINUA, GLUCUA, KETONESU, BILIRUBINUA, OCCULTUA, NITRITE, UROBILINOGEN, LEUKOCYTESUR, RBCUA, WBCUA, BACTERIA, SQUAMEPITHEL, HYALINECASTS in the last 48 hours.    Invalid input(s): WRIGHTR  Microbiology Results (last 7 days)       Procedure Component Value Units Date/Time    Blood culture x two cultures. Draw prior to antibiotics. [[id number]] Collected: 08/16/22 2250    Order Status: Sent Specimen: Blood Updated: 08/16/22 2251    Blood culture x two cultures. Draw prior to antibiotics. [[id number]] Collected: 08/16/22 2250    Order Status: Sent Specimen: Blood from Antecubital, Right Hand Updated: 08/16/22 2251        Significant Imaging:  "  CXR: Pending.    US abdomen: Pending.       Assessment/Plan:      * Sepsis  This patient does have evidence of infective focus  My overall impression is sepsis. Vital signs were reviewed and noted in progress note.  Antibiotics given-   Antibiotics (From admission, onward)            Start     Stop Route Frequency Ordered    08/17/22 1100  vancomycin 1.5 g in dextrose 5 % 250 mL IVPB (ready to mix)         -- IV Every 12 hours (non-standard times) 08/17/22 0329    08/17/22 0357  vancomycin - pharmacy to dose  (vancomycin IVPB)        "And" Linked Group Details    -- IV pharmacy to manage frequency 08/17/22 0257    08/16/22 2230  piperacillin-tazobactam 4.5 g in dextrose 5 % 100 mL IVPB (ready to mix system)  (ED Adult Sepsis Treatment)         08/17 2229 IV Every 8 hours (non-standard times) 08/16/22 2217        Cultures were taken-   Microbiology Results (last 7 days)     Procedure Component Value Units Date/Time    Blood culture x two cultures. Draw prior to antibiotics. [[id number]] Collected: 08/16/22 2250    Order Status: Sent Specimen: Blood Updated: 08/16/22 2251    Blood culture x two cultures. Draw prior to antibiotics. [[id number]] Collected: 08/16/22 2250    Order Status: Sent Specimen: Blood from Antecubital, Right Hand Updated: 08/16/22 2251        Latest lactate reviewed, they are-  Recent Labs   Lab 08/16/22 2251 08/17/22 0254   LACTATE 2.1 1.0         Source- skin/soft tissue    Source control Achieved by- IV abx.  Consulting Infectious Diseases specialist for opinion and recommendations.        Cellulitis of abdominal wall  Acute on chronic:  - IV abx  - US abdomen ordered  - gentle IVFH  -  Lactic acid within normal limits.        Chronic combined systolic and diastolic CHF (congestive heart failure)  Patient is identified as having Combined Systolic and Diastolic heart failure that is Chronic. CHF is currently controlled. Latest ECHO performed and demonstrates- Results for orders placed during " the hospital encounter of 03/27/22    Echo    Interpretation Summary  · The left ventricle is severely enlarged with eccentric hypertrophy and severely decreased systolic function.  · The estimated ejection fraction is 25%.  · Moderate-to-severe mitral regurgitation.  · Mild right atrial enlargement.  · The estimated PA systolic pressure is 34 mmHg.  · Moderate left atrial enlargement.  · Mild pulmonic regurgitation.  · Mild tricuspid regurgitation.  · Normal right ventricular size with moderately reduced right ventricular systolic function.  · Atrial fibrillation observed with restrictive filling pattern present.  · Unchanged from prior  . Continue Furosemide Aldactone and monitor clinical status closely. Monitor on telemetry. Patient is off CHF pathway.  Monitor strict Is&Os and daily weights.  Place on fluid restriction of 2 L. Continue to stress to patient importance of self efficacy and  on diet for CHF. Last BNP reviewed- and noted below No results for input(s): BNP, BNPTRIAGEBLO in the last 168 hours..            Hyperkalemia  Acute:    Improved.  Follow daily BMP.        COPD (chronic obstructive pulmonary disease)  Patient's COPD is controlled currently.  Patient is currently off COPD Pathway. Continue scheduled inhalers Supplemental oxygen and monitor respiratory status closely.         ACP (advance care planning)  Advance Care Planning     Date: 08/17/2022    Code Status  In light of the patients advanced and life limiting illness,I engaged the the patient in a conversation about the patient's preferences for care  at the very end of life. The patient wishes to have a natural, peaceful death.  Along those lines, the patient does not wish to have CPR or other invasive treatments performed when her heart and/or breathing stops. I communicated to the patient that a DNR order would be placed in her medical record to reflect this preference.  I spent a total of 20 minutes engaging the patient in this  advance care planning discussion.             Hyperlipidemia associated with type 2 diabetes mellitus   Patient is chronically on statin.will continue for now. Monitor clinically. Last LDL was   Lab Results   Component Value Date    LDLCALC 41.6 (L) 03/25/2022          Hypertension associated with diabetes  Chronic, controlled.  Latest blood pressure and vitals reviewed-   Temp:  [98.3 °F (36.8 °C)-104.4 °F (40.2 °C)]   Pulse:  []   Resp:  [16-18]   BP: (118-156)/(58-90)   SpO2:  [92 %-96 %] .   Home meds for hypertension were reviewed and noted below.   Hypertension Medications             furosemide (LASIX) 40 MG tablet TAKE 1 TABLET(40 MG) BY MOUTH EVERY DAY    spironolactone (ALDACTONE) 50 MG tablet Take 1 tablet (50 mg total) by mouth once daily.          While in the hospital, will manage blood pressure as follows; Adjust home antihypertensive regimen as follows- hold spironolactone givenmild hyperkalemia    Will utilize p.r.n. blood pressure medication only if patient's blood pressure greater than  180/110 and she develops symptoms such as worsening chest pain or shortness of breath.      Chronic atrial fibrillation  Chronic:  - not currently on rate or rhythm control  - continue Eliquis        Major depression, recurrent, chronic        Long term current use of anticoagulant therapy  Chronic:  - continue Eliquis        Diabetes mellitus with neuropathy  Patient's FSGs are controlled on current medication regimen.  Last A1c reviewed-   Lab Results   Component Value Date    HGBA1C 5.1 07/22/2022     Most recent fingerstick glucose reviewed-   Recent Labs   Lab 08/17/22  0753   POCTGLUCOSE 115*     Current correctional scale  Low  Maintain anti-hyperglycemic dose as follows-   Antihyperglycemics (From admission, onward)            Start     Stop Route Frequency Ordered    08/17/22 0157  insulin aspart U-100 pen 0-5 Units         -- SubQ Before meals & nightly PRN 08/17/22 0101        Hold Oral  hypoglycemics while patient is in the hospital.    Severe obesity (BMI 35.0-35.9 with comorbidity)  Body mass index is 35.36 kg/m². Morbid obesity complicates all aspects of disease management from diagnostic modalities to treatment. Weight loss encouraged and health benefits explained to patient.           VTE Risk Mitigation (From admission, onward)         Ordered     apixaban tablet 5 mg  2 times daily         08/17/22 0302     Reason for No Pharmacological VTE Prophylaxis  Once        Question:  Reasons:  Answer:  Already adequately anticoagulated on oral Anticoagulants    08/17/22 0101     IP VTE HIGH RISK PATIENT  Once         08/17/22 0101     Place sequential compression device  Until discontinued         08/17/22 0101                Discharge Planning   KRISTOPHER: 8/20/22     Code Status: DNR   Is the patient medically ready for discharge?:     Reason for patient still in hospital (select all that apply): Patient trending condition and Consult recommendations  Discharge Plan A: Home with family                  Ree Corrales MD  Department of Hospital Medicine   [facility]

## 2022-08-17 NOTE — ASSESSMENT & PLAN NOTE
Acute on chronic:  - IV abx  - US abdomen ordered  - gentle IVFH  - initial lactate: 2.1  - repeat lactate: 1.0

## 2022-08-17 NOTE — SUBJECTIVE & OBJECTIVE
Interval History:   T-max 104.4 degree F.  Leukocytosis worsening  19.9 K. complaining of abdominal pain with increasing redness, pain and tenderness of lower left abdomen skin.  Denies any chest pain or shortness of breath.  Awaiting abdominal ultrasound, currently NPO.    Review of Systems   Constitutional:  Positive for chills and fever. Negative for fatigue.   HENT:  Negative for congestion, sore throat and trouble swallowing.    Eyes:  Negative for visual disturbance.   Respiratory:  Negative for cough, shortness of breath and wheezing.    Gastrointestinal:  Positive for abdominal pain (resolved). Negative for abdominal distention, diarrhea, nausea and vomiting.   Genitourinary:  Negative for difficulty urinating, dysuria and hematuria.   Musculoskeletal:  Negative for back pain.   Skin:  Positive for rash (to abdomen).   Neurological:  Negative for dizziness, light-headedness and headaches.   Hematological:  Bruises/bleeds easily.   Psychiatric/Behavioral:  Negative for confusion.    Objective:     Vital Signs (Most Recent):  Temp: 98.3 °F (36.8 °C) (08/17/22 0813)  Pulse: (!) 52 (08/17/22 0813)  Resp: 16 (08/17/22 0813)  BP: (!) 127/58 (08/17/22 0813)  SpO2: (!) 92 % (08/17/22 0813)   Vital Signs (24h Range):  Temp:  [98.3 °F (36.8 °C)-104.4 °F (40.2 °C)] 98.3 °F (36.8 °C)  Pulse:  [] 52  Resp:  [16-18] 16  SpO2:  [92 %-96 %] 92 %  BP: (118-156)/(58-90) 127/58     Weight: 105.5 kg (232 lb 9.4 oz)  Body mass index is 35.36 kg/m².    Intake/Output Summary (Last 24 hours) at 8/17/2022 0844  Last data filed at 8/17/2022 0055  Gross per 24 hour   Intake 1100 ml   Output --   Net 1100 ml      Physical Exam  Vitals and nursing note reviewed.   Constitutional:       General: She is not in acute distress.     Appearance: Normal appearance. She is obese. She is ill-appearing (chronically).   HENT:      Head: Normocephalic and atraumatic.      Mouth/Throat:      Mouth: Mucous membranes are moist.      Pharynx:  Oropharynx is clear.   Eyes:      Extraocular Movements: Extraocular movements intact.      Pupils: Pupils are equal, round, and reactive to light.   Cardiovascular:      Rate and Rhythm: Regular rhythm. Tachycardia present.      Pulses: Normal pulses.      Heart sounds: Normal heart sounds.   Pulmonary:      Effort: Pulmonary effort is normal. No respiratory distress.      Breath sounds: Decreased breath sounds present.   Abdominal:      General: Abdomen is flat. Bowel sounds are normal. There is no distension.      Palpations: Abdomen is soft.      Tenderness: There is no abdominal tenderness. There is no guarding or rebound.   Musculoskeletal:         General: Normal range of motion.      Cervical back: Normal range of motion and neck supple.   Skin:     General: Skin is warm.      Capillary Refill: Capillary refill takes 2 to 3 seconds.             Comments: Extensive cellulitis. Large area of erythema and warmth to left lower abdomen. Nontender to palpation. No fluctuance appreciated.    Neurological:      General: No focal deficit present.      Mental Status: She is alert and oriented to person, place, and time. Mental status is at baseline.   Psychiatric:         Mood and Affect: Mood normal.         Behavior: Behavior normal.       Significant Labs: All pertinent labs within the past 24 hours have been reviewed.  CBC:   Recent Labs   Lab 08/16/22 2251 08/17/22  0254   WBC 13.18* 19.99*   HGB 14.4 13.5   HCT 45.4 42.8    184     CMP:   Recent Labs   Lab 08/16/22 2251 08/17/22  0254    136   K 5.3* 4.4    102   CO2 27 21*    118*   BUN 24* 24*   CREATININE 0.7 0.7   CALCIUM 10.0 9.2   PROT 8.3 7.2   ALBUMIN 4.0 3.5   BILITOT 0.5 0.8   ALKPHOS 125 103   AST 32 29   ALT 30 27   ANIONGAP 13 13     Lactic Acid:   Recent Labs   Lab 08/16/22 2251 08/17/22  0254   LACTATE 2.1 1.0     Lipase: No results for input(s): LIPASE in the last 48 hours.  Urine Culture: No results for input(s):  LABURIN in the last 48 hours.  Urine Studies: No results for input(s): COLORU, APPEARANCEUA, PHUR, SPECGRAV, PROTEINUA, GLUCUA, KETONESU, BILIRUBINUA, OCCULTUA, NITRITE, UROBILINOGEN, LEUKOCYTESUR, RBCUA, WBCUA, BACTERIA, SQUAMEPITHEL, HYALINECASTS in the last 48 hours.    Invalid input(s): [facility]  Microbiology Results (last 7 days)       Procedure Component Value Units Date/Time    Blood culture x two cultures. Draw prior to antibiotics. [[id number]] Collected: 08/16/22 2250    Order Status: Sent Specimen: Blood Updated: 08/16/22 2251    Blood culture x two cultures. Draw prior to antibiotics. [[id number]] Collected: 08/16/22 2250    Order Status: Sent Specimen: Blood from Antecubital, Right Hand Updated: 08/16/22 2251        Significant Imaging:   CXR: Pending.    US abdomen: Pending.

## 2022-08-17 NOTE — PROGRESS NOTES
Pharmacokinetic Initial Assessment: IV Vancomycin    Assessment/Plan:    Initiate intravenous vancomycin with loading dose of 2000 mg once followed by a maintenance dose of vancomycin 1500mg IV every 12 hours  Desired empiric serum trough concentration is 15 to 20 mcg/mL  Draw vancomycin trough level 60 min prior to fourth dose on 8/18 at approximately 1000  Pharmacy will continue to follow and monitor vancomycin.      Please contact pharmacy at extension [phone] with any questions regarding this assessment.     Thank you for the consult,   Leobardo Cloud       Patient brief summary:  Nelly Tian is a 71 y.o. female initiated on antimicrobial therapy with IV Vancomycin for treatment of suspected skin & soft tissue infection    Drug Allergies:   Review of patient's allergies indicates:   Allergen Reactions    Dilaudid [hydromorphone] Anaphylaxis     Other reaction(s): Anaphylaxis  Other reaction(s): Unknown    Zyvox [linezolid in dextrose 5%] Shortness Of Breath       Actual Body Weight:   105.5 kg    Renal Function:   Estimated Creatinine Clearance: 95 mL/min (based on SCr of 0.7 mg/dL).,     Dialysis Method (if applicable):  N/A    CBC (last 72 hours):  Recent Labs   Lab Result Units 08/16/22  2251 08/17/22  0254   WBC K/uL 13.18* 19.99*   Hemoglobin g/dL 14.4 13.5   Hematocrit % 45.4 42.8   Platelets K/uL 184 184   Gran % % 77.1* 84.8*   Lymph % % 9.9* 4.1*   Mono % % 10.4 9.9   Eosinophil % % 1.6 0.3   Basophil % % 0.6 0.4   Differential Method  Automated Automated       Metabolic Panel (last 72 hours):  Recent Labs   Lab Result Units 08/16/22  2251   Sodium mmol/L 140   Potassium mmol/L 5.3*   Chloride mmol/L 100   CO2 mmol/L 27   Glucose mg/dL 108   BUN mg/dL 24*   Creatinine mg/dL 0.7   Albumin g/dL 4.0   Total Bilirubin mg/dL 0.5   Alkaline Phosphatase U/L 125   AST U/L 32   ALT U/L 30       Drug levels (last 3 results):  No results for input(s): VANCOMYCINRA, VANCORANDOM, VANCOMYCINPE, VANCOPEAK,  VANCOMYCINTR, VANCOTROUGH in the last 72 hours.    Microbiologic Results:  Microbiology Results (last 7 days)       Procedure Component Value Units Date/Time    Blood culture x two cultures. Draw prior to antibiotics. [[id number]] Collected: 08/16/22 2250    Order Status: Sent Specimen: Blood Updated: 08/16/22 2251    Blood culture x two cultures. Draw prior to antibiotics. [[id number]] Collected: 08/16/22 2250    Order Status: Sent Specimen: Blood from Antecubital, Right Hand Updated: 08/16/22 2251

## 2022-08-18 ENCOUNTER — OUTSIDE PLACE OF SERVICE (OUTPATIENT)
Dept: PULMONOLOGY | Facility: CLINIC | Age: 72
End: 2022-08-18
Payer: MEDICARE

## 2022-08-18 DIAGNOSIS — A41.9 SEPSIS: Primary | ICD-10-CM

## 2022-08-18 LAB
ALBUMIN SERPL BCP-MCNC: 2.9 G/DL (ref 3.5–5.2)
ALP SERPL-CCNC: 82 U/L (ref 55–135)
ALT SERPL W/O P-5'-P-CCNC: 19 U/L (ref 10–44)
ANION GAP SERPL CALC-SCNC: 9 MMOL/L (ref 8–16)
AST SERPL-CCNC: 17 U/L (ref 10–40)
BASOPHILS # BLD AUTO: 0.06 K/UL (ref 0–0.2)
BASOPHILS NFR BLD: 0.5 % (ref 0–1.9)
BILIRUB SERPL-MCNC: 0.5 MG/DL (ref 0.1–1)
BUN SERPL-MCNC: 14 MG/DL (ref 8–23)
CALCIUM SERPL-MCNC: 8.9 MG/DL (ref 8.7–10.5)
CHLORIDE SERPL-SCNC: 100 MMOL/L (ref 95–110)
CO2 SERPL-SCNC: 27 MMOL/L (ref 23–29)
CREAT SERPL-MCNC: 0.6 MG/DL (ref 0.5–1.4)
CRP SERPL-MCNC: 174.8 MG/L (ref 0–8.2)
DIFFERENTIAL METHOD: ABNORMAL
EOSINOPHIL # BLD AUTO: 0.5 K/UL (ref 0–0.5)
EOSINOPHIL NFR BLD: 3.7 % (ref 0–8)
ERYTHROCYTE [DISTWIDTH] IN BLOOD BY AUTOMATED COUNT: 13.6 % (ref 11.5–14.5)
EST. GFR  (NO RACE VARIABLE): >60 ML/MIN/1.73 M^2
GLUCOSE SERPL-MCNC: 82 MG/DL (ref 70–110)
HCT VFR BLD AUTO: 36.4 % (ref 37–48.5)
HGB BLD-MCNC: 11.6 G/DL (ref 12–16)
IMM GRANULOCYTES # BLD AUTO: 0.04 K/UL (ref 0–0.04)
IMM GRANULOCYTES NFR BLD AUTO: 0.3 % (ref 0–0.5)
LYMPHOCYTES # BLD AUTO: 1.2 K/UL (ref 1–4.8)
LYMPHOCYTES NFR BLD: 9 % (ref 18–48)
MAGNESIUM SERPL-MCNC: 2.2 MG/DL (ref 1.6–2.6)
MCH RBC QN AUTO: 31 PG (ref 27–31)
MCHC RBC AUTO-ENTMCNC: 31.9 G/DL (ref 32–36)
MCV RBC AUTO: 97 FL (ref 82–98)
MONOCYTES # BLD AUTO: 1.2 K/UL (ref 0.3–1)
MONOCYTES NFR BLD: 9.3 % (ref 4–15)
NEUTROPHILS # BLD AUTO: 9.9 K/UL (ref 1.8–7.7)
NEUTROPHILS NFR BLD: 77.2 % (ref 38–73)
NRBC BLD-RTO: 0 /100 WBC
PHOSPHATE SERPL-MCNC: 2.7 MG/DL (ref 2.7–4.5)
PLATELET # BLD AUTO: 160 K/UL (ref 150–450)
PMV BLD AUTO: 11.1 FL (ref 9.2–12.9)
POCT GLUCOSE: 81 MG/DL (ref 70–110)
POCT GLUCOSE: 90 MG/DL (ref 70–110)
POTASSIUM SERPL-SCNC: 4.3 MMOL/L (ref 3.5–5.1)
PROT SERPL-MCNC: 6.4 G/DL (ref 6–8.4)
RBC # BLD AUTO: 3.74 M/UL (ref 4–5.4)
SODIUM SERPL-SCNC: 136 MMOL/L (ref 136–145)
VANCOMYCIN TROUGH SERPL-MCNC: 17.9 UG/ML (ref 10–22)
WBC # BLD AUTO: 12.81 K/UL (ref 3.9–12.7)

## 2022-08-18 PROCEDURE — 36415 COLL VENOUS BLD VENIPUNCTURE: CPT | Performed by: INTERNAL MEDICINE

## 2022-08-18 PROCEDURE — 63600175 PHARM REV CODE 636 W HCPCS

## 2022-08-18 PROCEDURE — 25000003 PHARM REV CODE 250: Performed by: INTERNAL MEDICINE

## 2022-08-18 PROCEDURE — 84100 ASSAY OF PHOSPHORUS: CPT

## 2022-08-18 PROCEDURE — 86140 C-REACTIVE PROTEIN: CPT | Performed by: INTERNAL MEDICINE

## 2022-08-18 PROCEDURE — 25000003 PHARM REV CODE 250

## 2022-08-18 PROCEDURE — 85025 COMPLETE CBC W/AUTO DIFF WBC: CPT

## 2022-08-18 PROCEDURE — 83735 ASSAY OF MAGNESIUM: CPT

## 2022-08-18 PROCEDURE — 80202 ASSAY OF VANCOMYCIN: CPT | Performed by: INTERNAL MEDICINE

## 2022-08-18 PROCEDURE — 63600175 PHARM REV CODE 636 W HCPCS: Performed by: INTERNAL MEDICINE

## 2022-08-18 PROCEDURE — 63700000 PHARM REV CODE 250 ALT 637 W/O HCPCS: Performed by: INTERNAL MEDICINE

## 2022-08-18 PROCEDURE — 12000002 HC ACUTE/MED SURGE SEMI-PRIVATE ROOM

## 2022-08-18 PROCEDURE — 97161 PT EVAL LOW COMPLEX 20 MIN: CPT

## 2022-08-18 PROCEDURE — 99231 SBSQ HOSP IP/OBS SF/LOW 25: CPT | Mod: S$GLB,,, | Performed by: INTERNAL MEDICINE

## 2022-08-18 PROCEDURE — 80053 COMPREHEN METABOLIC PANEL: CPT

## 2022-08-18 PROCEDURE — 97165 OT EVAL LOW COMPLEX 30 MIN: CPT

## 2022-08-18 PROCEDURE — 99231 PR SUBSEQUENT HOSPITAL CARE,LEVL I: ICD-10-PCS | Mod: S$GLB,,, | Performed by: INTERNAL MEDICINE

## 2022-08-18 RX ADMIN — VANCOMYCIN HYDROCHLORIDE 1500 MG: 1.5 INJECTION, POWDER, LYOPHILIZED, FOR SOLUTION INTRAVENOUS at 12:08

## 2022-08-18 RX ADMIN — HYDROCODONE BITARTRATE AND ACETAMINOPHEN 1 TABLET: 7.5; 325 TABLET ORAL at 09:08

## 2022-08-18 RX ADMIN — CLINDAMYCIN HYDROCHLORIDE 300 MG: 150 CAPSULE ORAL at 09:08

## 2022-08-18 RX ADMIN — FLUCONAZOLE 200 MG: 100 TABLET ORAL at 08:08

## 2022-08-18 RX ADMIN — VANCOMYCIN HYDROCHLORIDE 1500 MG: 1.5 INJECTION, POWDER, LYOPHILIZED, FOR SOLUTION INTRAVENOUS at 01:08

## 2022-08-18 RX ADMIN — VANCOMYCIN HYDROCHLORIDE 1500 MG: 1.5 INJECTION, POWDER, LYOPHILIZED, FOR SOLUTION INTRAVENOUS at 11:08

## 2022-08-18 RX ADMIN — APIXABAN 5 MG: 2.5 TABLET, FILM COATED ORAL at 09:08

## 2022-08-18 RX ADMIN — DOCUSATE SODIUM AND SENNOSIDES 1 TABLET: 8.6; 5 TABLET, FILM COATED ORAL at 09:08

## 2022-08-18 RX ADMIN — CLINDAMYCIN HYDROCHLORIDE 300 MG: 150 CAPSULE ORAL at 05:08

## 2022-08-18 RX ADMIN — GABAPENTIN 800 MG: 400 CAPSULE ORAL at 09:08

## 2022-08-18 RX ADMIN — HYDROCODONE BITARTRATE AND ACETAMINOPHEN 1 TABLET: 7.5; 325 TABLET ORAL at 01:08

## 2022-08-18 RX ADMIN — DOCUSATE SODIUM AND SENNOSIDES 1 TABLET: 8.6; 5 TABLET, FILM COATED ORAL at 08:08

## 2022-08-18 RX ADMIN — FUROSEMIDE 40 MG: 40 TABLET ORAL at 08:08

## 2022-08-18 RX ADMIN — CLINDAMYCIN HYDROCHLORIDE 300 MG: 150 CAPSULE ORAL at 03:08

## 2022-08-18 RX ADMIN — Medication 1 EACH: at 08:08

## 2022-08-18 RX ADMIN — THERA TABS 1 TABLET: TAB at 08:08

## 2022-08-18 RX ADMIN — APIXABAN 5 MG: 2.5 TABLET, FILM COATED ORAL at 08:08

## 2022-08-18 NOTE — PROGRESS NOTES
Pharmacokinetic Assessment Follow Up: IV Vancomycin    Vancomycin serum concentration assessment(s):    The trough level was drawn correctly and can be used to guide therapy at this time. The measurement is within the desired definitive target range of 15 to 20 mcg/mL.    Vancomycin Regimen Plan:    Continue regimen to Vancomycin 1500 mg IV every 12 hours with next serum trough concentration measured at 2300 prior to fourth dose on 8/19/22    Drug levels (last 3 results):  Recent Labs   Lab Result Units 08/18/22  0951   Vancomycin-Trough ug/mL 17.9       Pharmacy will continue to follow and monitor vancomycin.    Please contact pharmacy at extension [phone] for questions regarding this assessment.    Thank you for the consult,   Shaista Purcell       Patient brief summary:  Nelly Tian is a 71 y.o. female initiated on antimicrobial therapy with IV Vancomycin for treatment of skin & soft tissue infection      Drug Allergies:   Review of patient's allergies indicates:   Allergen Reactions    Dilaudid [hydromorphone] Anaphylaxis     Other reaction(s): Anaphylaxis  Other reaction(s): Unknown    Zyvox [linezolid in dextrose 5%] Shortness Of Breath       Actual Body Weight:   105.9 kg    Renal Function:   Estimated Creatinine Clearance: 111.1 mL/min (based on SCr of 0.6 mg/dL).,     Dialysis Method (if applicable):  N/A    CBC (last 72 hours):  Recent Labs   Lab Result Units 08/16/22 2251 08/17/22  0254 08/18/22  0456   WBC K/uL 13.18* 19.99* 12.81*   Hemoglobin g/dL 14.4 13.5 11.6*   Hematocrit % 45.4 42.8 36.4*   Platelets K/uL 184 184 160   Gran % % 77.1* 84.8* 77.2*   Lymph % % 9.9* 4.1* 9.0*   Mono % % 10.4 9.9 9.3   Eosinophil % % 1.6 0.3 3.7   Basophil % % 0.6 0.4 0.5   Differential Method  Automated Automated Automated       Metabolic Panel (last 72 hours):  Recent Labs   Lab Result Units 08/16/22 2251 08/17/22  0254 08/17/22  0956 08/18/22  0456   Sodium mmol/L 140 136  --  136   Potassium mmol/L 5.3* 4.4   --  4.3   Chloride mmol/L 100 102  --  100   CO2 mmol/L 27 21*  --  27   Glucose mg/dL 108 118*  --  82   Glucose, UA   --   --  2+*  --    BUN mg/dL 24* 24*  --  14   Creatinine mg/dL 0.7 0.7  --  0.6   Albumin g/dL 4.0 3.5  --  2.9*   Total Bilirubin mg/dL 0.5 0.8  --  0.5   Alkaline Phosphatase U/L 125 103  --  82   AST U/L 32 29  --  17   ALT U/L 30 27  --  19   Magnesium mg/dL  --  2.3  --  2.2   Phosphorus mg/dL  --  3.3  --  2.7       Vancomycin Administrations:  vancomycin given in the last 96 hours                     vancomycin 1.5 g in dextrose 5 % 250 mL IVPB (ready to mix) (mg) 1,500 mg New Bag 08/18/22 0118     1,500 mg New Bag 08/17/22 1043    vancomycin (VANCOCIN) 2,000 mg in dextrose 5 % 500 mL IVPB (mg) 2,000 mg New Bag 08/16/22 2251                    Microbiologic Results:  Microbiology Results (last 7 days)       Procedure Component Value Units Date/Time    Blood culture x two cultures. Draw prior to antibiotics. [[id number]] Collected: 08/16/22 2250    Order Status: Completed Specimen: Blood Updated: 08/17/22 1715     Blood Culture, Routine No Growth to date    Narrative:      Aerobic and anaerobic    Blood culture x two cultures. Draw prior to antibiotics. [[id number]] Collected: 08/16/22 2250    Order Status: Completed Specimen: Blood from Antecubital, Right Hand Updated: 08/17/22 1715     Blood Culture, Routine No Growth to date    Narrative:      Aerobic and anaerobic

## 2022-08-18 NOTE — PLAN OF CARE
POC discussed with pt, pt verbalized understanding. Oriented x4. PIV cdi, flushed. Afib on tele. Blood glucose monitored, no insulin needed per orders. 2 liters o2. Ambulated to the restroom with stand by assist. Redness and pain to the abdomen, pain relieved with prn's. Educated to not vape in room. Call light in reach, bed alarm set, safety maintained. No complaints or requests at this time, will continue to monitor.

## 2022-08-18 NOTE — PT/OT/SLP EVAL
Occupational Therapy   Evaluation and Discharge Note    Name: Nelly Tian  MRN: [id number]  Admitting Diagnosis:  Sepsis   Recent Surgery: * No surgery found *      Recommendations:     Discharge Recommendations: home  Discharge Equipment Recommendations:  none  Barriers to discharge:  None    Assessment:     Nelly Tian is a 71 y.o. female with a medical diagnosis of Sepsis. At this time, patient is modified independent with ADLs. Patient does not require further acute OT services.     Plan:     During this hospitalization, patient does not require further acute OT services.  Please re-consult if situation changes.    · Plan of Care Reviewed with: patient    Subjective     Chief Complaint: none  Patient/Family Comments/goals: none    Occupational Profile:  Living Environment: Patient lives with her daughter, grandson, and granddaughter in a [facility].   Previous level of function: Patient was independent with ADLs.  Patient was ambulatory with rollator.   Equipment Used at home:  oxygen, walker, rolling, cane, straight, rollator, shower chair, bedside commode  Assistance upon Discharge: Patient will receive assistance from family.     Pain/Comfort:  · Pain Rating 1: 0/10  · Pain Rating Post-Intervention 1: 0/10    Patients cultural, spiritual, Yazidi conflicts given the current situation:      Objective:     Communicated with: nurse Leigh prior to session.  Patient found HOB elevated with telemetry, peripheral IV upon OT entry to room.    General Precautions: Standard, fall   Orthopedic Precautions:N/A   Braces: N/A  Respiratory Status: Room air     Occupational Performance:    Bed Mobility:    · Patient completed Scooting/Bridging with modified independence  · Patient completed Supine to Sit with modified independence  · Patient completed Sit to Supine with modified independence    Functional Mobility/Transfers:  · Patient completed Sit <> Stand Transfer with supervision  with  no assistive device    · Patient completed Toilet Transfer Stand Pivot technique with supervision with  no AD    Activities of Daily Living:  · Grooming: modified independence with oral and facial hygiene while standing at sink  · Upper Body Dressing: modified independence   · Lower Body Dressing: modified independence to don/doff socks while seated EOB  · Toileting: modified independence with hesham-hygiene after voiding while seated on toilet    Cognitive/Visual Perceptual:  Cognitive/Psychosocial Skills:     -       Oriented to: x4   -       Follows Commands/attention:Follows multistep  commands  -       Communication: clear/fluent  -       Safety awareness/insight to disability: intact   -       Mood/Affect/Coping skills/emotional control: Appropriate to situation and Cooperative  Visual/Perceptual:      -Intact     Physical Exam:  Postural examination/scapula alignment:    -       Rounded shoulders  -       Forward head  Upper Extremity Range of Motion:     -       Right Upper Extremity: WFL  -       Left Upper Extremity: WFL  Upper Extremity Strength:    -       Right Upper Extremity: WFL  -       Left Upper Extremity: WFL   Strength:    -       Right Upper Extremity: WFL  -       Left Upper Extremity: WFL  Fine Motor Coordination:    -       Intact  Gross motor coordination:   WFL    AMPAC 6 Click ADL:  AMPAC Total Score: 23    Education:    Patient left HOB elevated with all lines intact and call button in reach    GOALS:   Multidisciplinary Problems     Occupational Therapy Goals     Not on file                History:     Past Medical History:   Diagnosis Date    *Atrial flutter     Angina pectoris 9/18/2017    Anxiety     Arthritis     Asthma     Atrial fibrillation     Back pain     Cataract     OD    CHF (congestive heart failure)     Chronically on benzodiazepine therapy 5/4/2019    COPD (chronic obstructive pulmonary disease)     Depression     Diabetes mellitus     Emphysema of lung     Heart failure      Hepatomegaly 2/3/2016    Hernia     History of MI (myocardial infarction) 2016    Hypercapnic respiratory failure, chronic 2016    Hyperlipidemia     Hypertension     Iron deficiency anemia 2/3/2016    Myocardial infarction     Obesity     Peripheral vascular disease     Pneumonia     Polyneuropathy     Retinal detachment     OS    Septic shock 2017    Skin ulcer 3/18/2017    Tobacco dependence     Type II 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, not stated as uncontrolled(250.60)        Past Surgical History:   Procedure Laterality Date    BREAST BIOPSY Left 10 yrs ago    benign    CATARACT EXTRACTION Left     OS      SECTION      x2    EYE SURGERY      HYSTERECTOMY      partial    OOPHORECTOMY      one ovary conserved    RETINAL DETACHMENT SURGERY      buckle --OS    TONSILLECTOMY         Time Tracking:     OT Date of Treatment: 22  OT Start Time: 1119  OT Stop Time: 1129  OT Total Time (min): 10 min    Billable Minutes:Evaluation 10    2022

## 2022-08-18 NOTE — ASSESSMENT & PLAN NOTE
"This patient does have evidence of infective focus  My overall impression is sepsis. Vital signs were reviewed and noted in progress note.  Antibiotics given-   Antibiotics (From admission, onward)            Start     Stop Route Frequency Ordered    08/17/22 2200  clindamycin capsule 300 mg         -- Oral Every 8 hours 08/17/22 1908    08/17/22 1100  vancomycin 1.5 g in dextrose 5 % 250 mL IVPB (ready to mix)         -- IV Every 12 hours (non-standard times) 08/17/22 0329    08/17/22 0357  vancomycin - pharmacy to dose  (vancomycin IVPB)        "And" Linked Group Details    -- IV pharmacy to manage frequency 08/17/22 0257        Cultures were taken-   Microbiology Results (last 7 days)     Procedure Component Value Units Date/Time    Blood culture x two cultures. Draw prior to antibiotics. [[id number]] Collected: 08/16/22 2250    Order Status: Completed Specimen: Blood Updated: 08/17/22 1715     Blood Culture, Routine No Growth to date    Narrative:      Aerobic and anaerobic    Blood culture x two cultures. Draw prior to antibiotics. [[id number]] Collected: 08/16/22 2250    Order Status: Completed Specimen: Blood from Antecubital, Right Hand Updated: 08/17/22 1715     Blood Culture, Routine No Growth to date    Narrative:      Aerobic and anaerobic        Latest lactate reviewed, they are-  Recent Labs   Lab 08/16/22 2251 08/17/22  0254   LACTATE 2.1 1.0         Source- skin/soft tissue    Source control Achieved by- IV abx.  ID following.      "

## 2022-08-18 NOTE — ASSESSMENT & PLAN NOTE
Acute on chronic:  - continue intravenous vancomycin and oral clindamycin as per directions by ID specialist.  Pharmacist to dose vancomycin.  - Abdominal ultrasound results reviewed which did not report fluid collection/abscess.

## 2022-08-18 NOTE — SUBJECTIVE & OBJECTIVE
Interval History:  Fever curve improved, currently afebrile.  ID specialist following.  Oral clindamycin was added yesterday. Denies any chest pain or shortness of breath.  Patient reports improving cellulitis of abdominal wall.  Denies any chest pain or shortness of breath.    Review of Systems   Constitutional:  Positive for chills and fever. Negative for fatigue.   HENT:  Negative for congestion, sore throat and trouble swallowing.    Eyes:  Negative for visual disturbance.   Respiratory:  Negative for cough, shortness of breath and wheezing.    Gastrointestinal:  Positive for abdominal pain (resolved). Negative for abdominal distention, diarrhea, nausea and vomiting.   Genitourinary:  Negative for difficulty urinating, dysuria and hematuria.   Musculoskeletal:  Negative for back pain.   Skin:  Positive for rash (to abdomen).   Neurological:  Negative for dizziness, light-headedness and headaches.   Hematological:  Bruises/bleeds easily.   Psychiatric/Behavioral:  Negative for confusion.    Objective:     Vital Signs (Most Recent):  Temp: 97.9 °F (36.6 °C) (08/18/22 0804)  Pulse: 61 (08/18/22 0804)  Resp: 18 (08/18/22 0804)  BP: (!) 117/56 (08/18/22 0804)  SpO2: (!) 94 % (08/18/22 0804)   Vital Signs (24h Range):  Temp:  [96.9 °F (36.1 °C)-98.8 °F (37.1 °C)] 97.9 °F (36.6 °C)  Pulse:  [51-71] 61  Resp:  [15-18] 18  SpO2:  [91 %-94 %] 94 %  BP: (101-126)/(51-59) 117/56     Weight: 105.9 kg (233 lb 6.4 oz)  Body mass index is 35.49 kg/m².    Intake/Output Summary (Last 24 hours) at 8/18/2022 0829  Last data filed at 8/18/2022 0535  Gross per 24 hour   Intake 847.95 ml   Output 1800 ml   Net -952.05 ml        Physical Exam  Vitals and nursing note reviewed.   Constitutional:       General: She is not in acute distress.     Appearance: Normal appearance. She is obese. She is ill-appearing (chronically).   HENT:      Head: Normocephalic and atraumatic.      Mouth/Throat:      Mouth: Mucous membranes are moist.       Pharynx: Oropharynx is clear.   Eyes:      Extraocular Movements: Extraocular movements intact.      Pupils: Pupils are equal, round, and reactive to light.   Cardiovascular:      Rate and Rhythm: Normal rate and regular rhythm.      Pulses: Normal pulses.      Heart sounds: Normal heart sounds.   Pulmonary:      Effort: Pulmonary effort is normal. No respiratory distress.      Breath sounds: Decreased breath sounds present.   Abdominal:      General: Abdomen is flat. Bowel sounds are normal. There is no distension.      Palpations: Abdomen is soft.      Tenderness: There is no abdominal tenderness. There is no guarding or rebound.      Comments: Improving cellulitis of lower and left abdominal wall without any focal area of fluctuation or discoloration.   Musculoskeletal:         General: Normal range of motion.      Cervical back: Normal range of motion and neck supple.   Skin:     General: Skin is warm.      Capillary Refill: Capillary refill takes 2 to 3 seconds.             Comments: Extensive cellulitis. Large area of erythema and warmth to left lower abdomen. Nontender to palpation. No fluctuance appreciated.    Neurological:      General: No focal deficit present.      Mental Status: She is alert and oriented to person, place, and time. Mental status is at baseline.   Psychiatric:         Mood and Affect: Mood normal.         Behavior: Behavior normal.       Significant Labs: All pertinent labs within the past 24 hours have been reviewed.  CBC:   Recent Labs   Lab 08/16/22  2251 08/17/22  0254 08/18/22  0456   WBC 13.18* 19.99* 12.81*   HGB 14.4 13.5 11.6*   HCT 45.4 42.8 36.4*    184 160       CMP:   Recent Labs   Lab 08/16/22  2251 08/17/22  0254 08/18/22  0456    136 136   K 5.3* 4.4 4.3    102 100   CO2 27 21* 27    118* 82   BUN 24* 24* 14   CREATININE 0.7 0.7 0.6   CALCIUM 10.0 9.2 8.9   PROT 8.3 7.2 6.4   ALBUMIN 4.0 3.5 2.9*   BILITOT 0.5 0.8 0.5   ALKPHOS 125 103 82   AST  32 29 17   ALT 30 27 19   ANIONGAP 13 13 9       Lactic Acid:   Recent Labs   Lab 08/16/22 2251 08/17/22  0254   LACTATE 2.1 1.0       Lipase: No results for input(s): LIPASE in the last 48 hours.  Urine Culture: No results for input(s): LABURIN in the last 48 hours.  Urine Studies:   Recent Labs   Lab 08/17/22  0956   COLORU Yellow   APPEARANCEUA Clear   PHUR 6.0   SPECGRAV 1.020   PROTEINUA Negative   GLUCUA 2+*   KETONESU Negative   BILIRUBINUA Negative   OCCULTUA Trace*   NITRITE Negative   UROBILINOGEN Negative   LEUKOCYTESUR Negative       Microbiology Results (last 7 days)       Procedure Component Value Units Date/Time    Blood culture x two cultures. Draw prior to antibiotics. [[id number]] Collected: 08/16/22 2250    Order Status: Completed Specimen: Blood Updated: 08/17/22 1715     Blood Culture, Routine No Growth to date    Narrative:      Aerobic and anaerobic    Blood culture x two cultures. Draw prior to antibiotics. [[id number]] Collected: 08/16/22 2250    Order Status: Completed Specimen: Blood from Antecubital, Right Hand Updated: 08/17/22 1715     Blood Culture, Routine No Growth to date    Narrative:      Aerobic and anaerobic        Significant Imaging:   CXR: Cardiomegaly.  No acute process.    US abdomen:   Cellulitis of the left lower abdominal wall without an abscess seen

## 2022-08-18 NOTE — PT/OT/SLP EVAL
Physical Therapy Evaluation    Patient Name:  Nelly Tian   MRN:  [id number]    Recommendations:     Discharge Recommendations:  home (declines HHPT need)   Discharge Equipment Recommendations: none   Barriers to discharge: None    Assessment:     Nelly Tian is a 71 y.o. female admitted with a medical diagnosis of Sepsis.  She presents with the following impairments/functional limitations:  weakness, impaired endurance, impaired functional mobility, gait instability, impaired balance, pain, impaired skin, edema. Patient is agreeable to participation with PT evaluation. She reports 6/10 left sided abdominal pain at rest. She lives with daughter and does not use an AD for ambulation at baseline. She requires SBA for supine <> sit <> stand transfer with no AD. She ambulated 40' in room holding IV pole with SBA. She requests to use the restroom and performs toilet transfer with SBA. She returned to bed with all needs met and RN notified.     Rehab Prognosis: Good; patient would benefit from acute skilled PT services to address these deficits and reach maximum level of function.    Recent Surgery: * No surgery found *      Plan:     During this hospitalization, patient to be seen 6 x/week to address the identified rehab impairments via gait training, therapeutic activities, therapeutic exercises and progress toward the following goals:    · Plan of Care Expires:  09/18/22    Subjective     Chief Complaint: left sided abdominal pain   Patient/Family Comments/goals: home  Pain/Comfort:  · Pain Rating 1: 6/10  · Location - Side 1: Left  · Location 1: abdomen  · Pain Addressed 1: Reposition, Distraction    Patients cultural, spiritual, Presybeterian conflicts given the current situation:      Living Environment:  Patient lives with daughter and disabled grandson in a [facility] with no SARAH   Prior to admission, patients level of function was no AD for ambulation at baseline. She reports having a rollator, but not  really using for ambulation and just using the seat function around her home. She denies any recent falls or PT. She does not drive.  Equipment used at home: rollator, oxygen, cane, straight, bedside commode, shower chair.  DME owned (not currently used): none.  Upon discharge, patient will have assistance from family and HHRN.    Objective:     Communicated with JUANITA Leigh prior to session.  Patient found HOB elevated with peripheral IV, telemetry (no bed alarm) upon PT entry to room.    General Precautions: Standard, fall   Orthopedic Precautions:N/A   Braces: N/A  Respiratory Status: Room air    Exams:  · RLE Strength: WFL  · LLE Strength: WFL    Functional Mobility:  · Bed Mobility:     · Supine to Sit: stand by assistance  · Transfers:     · Sit to Stand:  stand by assistance with no AD  · Toilet Transfer: stand by assistance with  no AD  using  Step Transfer  · Gait: 40' in room holding IV pole with SBA    Therapeutic Activities and Exercises:   Patient was educated on the importance of OOB activity and functional mobility to negate negative effects of prolonged bed rest during hospitalization, safe transfers and ambulation, and D/C planning     Patient requires SBA for toilet transfer and performs hygiene independently     AM-PAC 6 CLICK MOBILITY  Total Score:22     Patient left HOB elevated with all lines intact, call button in reach and RN notified.    GOALS:   Multidisciplinary Problems     Physical Therapy Goals        Problem: Physical Therapy    Goal Priority Disciplines Outcome Goal Variances Interventions   Physical Therapy Goal     PT, PT/OT      Description: Goals to be met by: 22    Patient will increase functional independence with mobility by performin. Supine to sit with Supervision  2. Sit to stand transfer with Supervision  3. Bed to chair transfer with Supervision using Rolling Walker or no AD  4. Gait  x 250 feet with Supervision using Rolling Walker or no AD.   5. Lower  extremity exercise program x20 reps per handout, with supervision                   History:     Past Medical History:   Diagnosis Date    *Atrial flutter     Angina pectoris 2017    Anxiety     Arthritis     Asthma     Atrial fibrillation     Back pain     Cataract     OD    CHF (congestive heart failure)     Chronically on benzodiazepine therapy 2019    COPD (chronic obstructive pulmonary disease)     Depression     Diabetes mellitus     Emphysema of lung     Heart failure     Hepatomegaly 2/3/2016    Hernia     History of MI (myocardial infarction) 2016    Hypercapnic respiratory failure, chronic 2016    Hyperlipidemia     Hypertension     Iron deficiency anemia 2/3/2016    Myocardial infarction     Obesity     Peripheral vascular disease     Pneumonia     Polyneuropathy     Retinal detachment     OS    Septic shock 2017    Skin ulcer 3/18/2017    Tobacco dependence     Type II 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, not stated as uncontrolled(250.60)        Past Surgical History:   Procedure Laterality Date    BREAST BIOPSY Left 10 yrs ago    benign    CATARACT EXTRACTION Left     OS      SECTION      x2    EYE SURGERY      HYSTERECTOMY      partial    OOPHORECTOMY      one ovary conserved    RETINAL DETACHMENT SURGERY      buckle --OS    TONSILLECTOMY         Time Tracking:     PT Received On: 22  PT Start Time: 1047     PT Stop Time: 1101  PT Total Time (min): 14 min     Billable Minutes: Evaluation 14      2022

## 2022-08-18 NOTE — PROGRESS NOTES
[facility] Hospital Medicine  Progress Note    Patient Name: Nelly Tian  MRN: [id number]  Patient Class: IP- Inpatient   Admission Date: 8/16/2022  Length of Stay: 1 days  Attending Physician: Ree Corrales MD  Primary Care Provider: Constantine Bird MD        Subjective:     Principal Problem:Sepsis        HPI:  Nelly Tian is a 71 y.o. y.o. female with a PMHx of A-fib, CHF, COPD, T2DM, CHF, COPD, and septic shock with numerous episodes of abdominal cellulitis who presented to the ED with abdominal cellulitis and abdominal pain onset x 1 day. She described lower abdominal pain as a sharp sensation that did not radiate. Denies alleviating or exacerbating factors. Pain was 10/10 at its worst and denies pain currently. She reports Hx of cellulitis to abdomen and states this is similar to previous episodes. She also reports fever and chills. She denies fatigue, CP, palpitations, SOB, cough, n/v/d, and changes in urination. ED workup was significant for elevated WBC, fever, and mild hyperkalemia. Hospital Medicine was consulted for further workup and management of the patient.           Overview/Hospital Course:  No notes on file    Interval History:  Fever curve improved, currently afebrile.  ID specialist following.  Oral clindamycin was added yesterday. Denies any chest pain or shortness of breath.  Patient reports improving cellulitis of abdominal wall.  Denies any chest pain or shortness of breath.    Review of Systems   Constitutional:  Positive for chills and fever. Negative for fatigue.   HENT:  Negative for congestion, sore throat and trouble swallowing.    Eyes:  Negative for visual disturbance.   Respiratory:  Negative for cough, shortness of breath and wheezing.    Gastrointestinal:  Positive for abdominal pain (resolved). Negative for abdominal distention, diarrhea, nausea and vomiting.   Genitourinary:  Negative for difficulty urinating, dysuria and hematuria.    Musculoskeletal:  Negative for back pain.   Skin:  Positive for rash (to abdomen).   Neurological:  Negative for dizziness, light-headedness and headaches.   Hematological:  Bruises/bleeds easily.   Psychiatric/Behavioral:  Negative for confusion.    Objective:     Vital Signs (Most Recent):  Temp: 97.9 °F (36.6 °C) (08/18/22 0804)  Pulse: 61 (08/18/22 0804)  Resp: 18 (08/18/22 0804)  BP: (!) 117/56 (08/18/22 0804)  SpO2: (!) 94 % (08/18/22 0804)   Vital Signs (24h Range):  Temp:  [96.9 °F (36.1 °C)-98.8 °F (37.1 °C)] 97.9 °F (36.6 °C)  Pulse:  [51-71] 61  Resp:  [15-18] 18  SpO2:  [91 %-94 %] 94 %  BP: (101-126)/(51-59) 117/56     Weight: 105.9 kg (233 lb 6.4 oz)  Body mass index is 35.49 kg/m².    Intake/Output Summary (Last 24 hours) at 8/18/2022 0829  Last data filed at 8/18/2022 0535  Gross per 24 hour   Intake 847.95 ml   Output 1800 ml   Net -952.05 ml        Physical Exam  Vitals and nursing note reviewed.   Constitutional:       General: She is not in acute distress.     Appearance: Normal appearance. She is obese. She is ill-appearing (chronically).   HENT:      Head: Normocephalic and atraumatic.      Mouth/Throat:      Mouth: Mucous membranes are moist.      Pharynx: Oropharynx is clear.   Eyes:      Extraocular Movements: Extraocular movements intact.      Pupils: Pupils are equal, round, and reactive to light.   Cardiovascular:      Rate and Rhythm: Normal rate and regular rhythm.      Pulses: Normal pulses.      Heart sounds: Normal heart sounds.   Pulmonary:      Effort: Pulmonary effort is normal. No respiratory distress.      Breath sounds: Decreased breath sounds present.   Abdominal:      General: Abdomen is flat. Bowel sounds are normal. There is no distension.      Palpations: Abdomen is soft.      Tenderness: There is no abdominal tenderness. There is no guarding or rebound.      Comments: Improving cellulitis of lower and left abdominal wall without any focal area of fluctuation or  discoloration.   Musculoskeletal:         General: Normal range of motion.      Cervical back: Normal range of motion and neck supple.   Skin:     General: Skin is warm.      Capillary Refill: Capillary refill takes 2 to 3 seconds.             Comments: Extensive cellulitis. Large area of erythema and warmth to left lower abdomen. Nontender to palpation. No fluctuance appreciated.    Neurological:      General: No focal deficit present.      Mental Status: She is alert and oriented to person, place, and time. Mental status is at baseline.   Psychiatric:         Mood and Affect: Mood normal.         Behavior: Behavior normal.       Significant Labs: All pertinent labs within the past 24 hours have been reviewed.  CBC:   Recent Labs   Lab 08/16/22 2251 08/17/22 0254 08/18/22 0456   WBC 13.18* 19.99* 12.81*   HGB 14.4 13.5 11.6*   HCT 45.4 42.8 36.4*    184 160       CMP:   Recent Labs   Lab 08/16/22 2251 08/17/22 0254 08/18/22 0456    136 136   K 5.3* 4.4 4.3    102 100   CO2 27 21* 27    118* 82   BUN 24* 24* 14   CREATININE 0.7 0.7 0.6   CALCIUM 10.0 9.2 8.9   PROT 8.3 7.2 6.4   ALBUMIN 4.0 3.5 2.9*   BILITOT 0.5 0.8 0.5   ALKPHOS 125 103 82   AST 32 29 17   ALT 30 27 19   ANIONGAP 13 13 9       Lactic Acid:   Recent Labs   Lab 08/16/22 2251 08/17/22 0254   LACTATE 2.1 1.0       Lipase: No results for input(s): LIPASE in the last 48 hours.  Urine Culture: No results for input(s): LABURIN in the last 48 hours.  Urine Studies:   Recent Labs   Lab 08/17/22  0956   COLORU Yellow   APPEARANCEUA Clear   PHUR 6.0   SPECGRAV 1.020   PROTEINUA Negative   GLUCUA 2+*   KETONESU Negative   BILIRUBINUA Negative   OCCULTUA Trace*   NITRITE Negative   UROBILINOGEN Negative   LEUKOCYTESUR Negative       Microbiology Results (last 7 days)       Procedure Component Value Units Date/Time    Blood culture x two cultures. Draw prior to antibiotics. [[id number]] Collected: 08/16/22 2250    Order Status:  "Completed Specimen: Blood Updated: 08/17/22 1715     Blood Culture, Routine No Growth to date    Narrative:      Aerobic and anaerobic    Blood culture x two cultures. Draw prior to antibiotics. [[id number]] Collected: 08/16/22 2250    Order Status: Completed Specimen: Blood from Antecubital, Right Hand Updated: 08/17/22 1715     Blood Culture, Routine No Growth to date    Narrative:      Aerobic and anaerobic        Significant Imaging:   CXR: Cardiomegaly.  No acute process.    US abdomen:   Cellulitis of the left lower abdominal wall without an abscess seen      Assessment/Plan:      * Sepsis  This patient does have evidence of infective focus  My overall impression is sepsis. Vital signs were reviewed and noted in progress note.  Antibiotics given-   Antibiotics (From admission, onward)            Start     Stop Route Frequency Ordered    08/17/22 2200  clindamycin capsule 300 mg         -- Oral Every 8 hours 08/17/22 1908    08/17/22 1100  vancomycin 1.5 g in dextrose 5 % 250 mL IVPB (ready to mix)         -- IV Every 12 hours (non-standard times) 08/17/22 0329    08/17/22 0357  vancomycin - pharmacy to dose  (vancomycin IVPB)        "And" Linked Group Details    -- IV pharmacy to manage frequency 08/17/22 0257        Cultures were taken-   Microbiology Results (last 7 days)     Procedure Component Value Units Date/Time    Blood culture x two cultures. Draw prior to antibiotics. [[id number]] Collected: 08/16/22 2250    Order Status: Completed Specimen: Blood Updated: 08/17/22 1715     Blood Culture, Routine No Growth to date    Narrative:      Aerobic and anaerobic    Blood culture x two cultures. Draw prior to antibiotics. [[id number]] Collected: 08/16/22 2250    Order Status: Completed Specimen: Blood from Antecubital, Right Hand Updated: 08/17/22 1715     Blood Culture, Routine No Growth to date    Narrative:      Aerobic and anaerobic        Latest lactate reviewed, they are-  Recent Labs   Lab 08/16/22 2251 " 08/17/22  0254   LACTATE 2.1 1.0         Source- skin/soft tissue    Source control Achieved by- IV abx.  ID following.        Cellulitis of abdominal wall  Acute on chronic:  - continue intravenous vancomycin and oral clindamycin as per directions by ID specialist.  Pharmacist to dose vancomycin.  - Abdominal ultrasound results reviewed which did not report fluid collection/abscess.          Chronic combined systolic and diastolic CHF (congestive heart failure)  Patient is identified as having Combined Systolic and Diastolic heart failure that is Chronic. CHF is currently controlled. Latest ECHO performed and demonstrates- Results for orders placed during the hospital encounter of 03/27/22    Echo    Interpretation Summary  · The left ventricle is severely enlarged with eccentric hypertrophy and severely decreased systolic function.  · The estimated ejection fraction is 25%.  · Moderate-to-severe mitral regurgitation.  · Mild right atrial enlargement.  · The estimated PA systolic pressure is 34 mmHg.  · Moderate left atrial enlargement.  · Mild pulmonic regurgitation.  · Mild tricuspid regurgitation.  · Normal right ventricular size with moderately reduced right ventricular systolic function.  · Atrial fibrillation observed with restrictive filling pattern present.  · Unchanged from prior  . Continue Furosemide Aldactone and monitor clinical status closely. Monitor on telemetry. Patient is off CHF pathway.  Monitor strict Is&Os and daily weights.  Place on fluid restriction of 2 L. Continue to stress to patient importance of self efficacy and  on diet for CHF. Last BNP reviewed- and noted below No results for input(s): BNP, BNPTRIAGEBLO in the last 168 hours..            Hyperkalemia  Acute:    Improved.  Follow daily BMP.        COPD (chronic obstructive pulmonary disease)  Patient's COPD is controlled currently.  Patient is currently off COPD Pathway. Continue scheduled inhalers Supplemental oxygen and  monitor respiratory status closely.         ACP (advance care planning)  Advance Care Planning     Date: 08/17/2022    Code Status  In light of the patients advanced and life limiting illness,I engaged the the patient in a conversation about the patient's preferences for care  at the very end of life. The patient wishes to have a natural, peaceful death.  Along those lines, the patient does not wish to have CPR or other invasive treatments performed when her heart and/or breathing stops. I communicated to the patient that a DNR order would be placed in her medical record to reflect this preference.  I spent a total of 20 minutes engaging the patient in this advance care planning discussion.             Hyperlipidemia associated with type 2 diabetes mellitus   Patient is chronically on statin.will continue for now. Monitor clinically. Last LDL was   Lab Results   Component Value Date    LDLCALC 41.6 (L) 03/25/2022          Hypertension associated with diabetes  Chronic, controlled.  Latest blood pressure and vitals reviewed-   Temp:  [98.3 °F (36.8 °C)-104.4 °F (40.2 °C)]   Pulse:  []   Resp:  [16-18]   BP: (118-156)/(58-90)   SpO2:  [92 %-96 %] .   Home meds for hypertension were reviewed and noted below.   Hypertension Medications             furosemide (LASIX) 40 MG tablet TAKE 1 TABLET(40 MG) BY MOUTH EVERY DAY    spironolactone (ALDACTONE) 50 MG tablet Take 1 tablet (50 mg total) by mouth once daily.          While in the hospital, will manage blood pressure as follows; Adjust home antihypertensive regimen as follows- hold spironolactone givenmild hyperkalemia    Will utilize p.r.n. blood pressure medication only if patient's blood pressure greater than  180/110 and she develops symptoms such as worsening chest pain or shortness of breath.      Chronic atrial fibrillation  Chronic:  - not currently on rate or rhythm control  - continue Eliquis        Major depression, recurrent, chronic        Long term  current use of anticoagulant therapy  Chronic:  - continue Eliquis        Diabetes mellitus with neuropathy  Patient's FSGs are controlled on current medication regimen.  Last A1c reviewed-   Lab Results   Component Value Date    HGBA1C 5.1 07/22/2022     Most recent fingerstick glucose reviewed-   Recent Labs   Lab 08/17/22  0753   POCTGLUCOSE 115*     Current correctional scale  Low  Maintain anti-hyperglycemic dose as follows-   Antihyperglycemics (From admission, onward)            Start     Stop Route Frequency Ordered    08/17/22 0157  insulin aspart U-100 pen 0-5 Units         -- SubQ Before meals & nightly PRN 08/17/22 0101        Hold Oral hypoglycemics while patient is in the hospital.    Severe obesity (BMI 35.0-35.9 with comorbidity)  Body mass index is 35.36 kg/m². Morbid obesity complicates all aspects of disease management from diagnostic modalities to treatment. Weight loss encouraged and health benefits explained to patient.         Continue PT and OT.  VTE Risk Mitigation (From admission, onward)         Ordered     apixaban tablet 5 mg  2 times daily         08/17/22 0302     Reason for No Pharmacological VTE Prophylaxis  Once        Question:  Reasons:  Answer:  Already adequately anticoagulated on oral Anticoagulants    08/17/22 0101     IP VTE HIGH RISK PATIENT  Once         08/17/22 0101     Place sequential compression device  Until discontinued         08/17/22 0101                Discharge Planning   KRISTOPHER: 8/20/2022     Code Status: DNR   Is the patient medically ready for discharge?:     Reason for patient still in hospital (select all that apply): Patient trending condition and Consult recommendations  Discharge Plan A: Home with family                  Ree Corrales MD  Department of Hospital Medicine   [facility]

## 2022-08-18 NOTE — PLAN OF CARE
POC reviewed with pt. Patient is alert and oriented x 4. DNR status. Continuous cardiac monitoring, TELE # [id number]- A Fib.   Rt AC 20g w NS infusing x 1 bag only. A-febrile throughout the shift. Meds given per MAR. Pt on 2 L NC. Blood glucose monitored. Bed low and locked.

## 2022-08-19 VITALS
RESPIRATION RATE: 16 BRPM | OXYGEN SATURATION: 90 % | HEART RATE: 54 BPM | BODY MASS INDEX: 35.37 KG/M2 | TEMPERATURE: 97 F | HEIGHT: 68 IN | DIASTOLIC BLOOD PRESSURE: 71 MMHG | WEIGHT: 233.38 LBS | SYSTOLIC BLOOD PRESSURE: 106 MMHG

## 2022-08-19 LAB
ALBUMIN SERPL BCP-MCNC: 2.8 G/DL (ref 3.5–5.2)
ALP SERPL-CCNC: 97 U/L (ref 55–135)
ALT SERPL W/O P-5'-P-CCNC: 22 U/L (ref 10–44)
ANION GAP SERPL CALC-SCNC: 9 MMOL/L (ref 8–16)
AST SERPL-CCNC: 21 U/L (ref 10–40)
BASOPHILS # BLD AUTO: 0.04 K/UL (ref 0–0.2)
BASOPHILS NFR BLD: 0.5 % (ref 0–1.9)
BILIRUB SERPL-MCNC: 0.4 MG/DL (ref 0.1–1)
BUN SERPL-MCNC: 15 MG/DL (ref 8–23)
CALCIUM SERPL-MCNC: 9.5 MG/DL (ref 8.7–10.5)
CHLORIDE SERPL-SCNC: 98 MMOL/L (ref 95–110)
CO2 SERPL-SCNC: 29 MMOL/L (ref 23–29)
CREAT SERPL-MCNC: 0.6 MG/DL (ref 0.5–1.4)
DIFFERENTIAL METHOD: ABNORMAL
EOSINOPHIL # BLD AUTO: 0.8 K/UL (ref 0–0.5)
EOSINOPHIL NFR BLD: 9.5 % (ref 0–8)
ERYTHROCYTE [DISTWIDTH] IN BLOOD BY AUTOMATED COUNT: 13.2 % (ref 11.5–14.5)
EST. GFR  (NO RACE VARIABLE): >60 ML/MIN/1.73 M^2
GLUCOSE SERPL-MCNC: 74 MG/DL (ref 70–110)
HCT VFR BLD AUTO: 36.4 % (ref 37–48.5)
HGB BLD-MCNC: 11.7 G/DL (ref 12–16)
IMM GRANULOCYTES # BLD AUTO: 0.03 K/UL (ref 0–0.04)
IMM GRANULOCYTES NFR BLD AUTO: 0.4 % (ref 0–0.5)
LYMPHOCYTES # BLD AUTO: 1.2 K/UL (ref 1–4.8)
LYMPHOCYTES NFR BLD: 13.6 % (ref 18–48)
MAGNESIUM SERPL-MCNC: 1.9 MG/DL (ref 1.6–2.6)
MCH RBC QN AUTO: 30.6 PG (ref 27–31)
MCHC RBC AUTO-ENTMCNC: 32.1 G/DL (ref 32–36)
MCV RBC AUTO: 95 FL (ref 82–98)
MONOCYTES # BLD AUTO: 1.2 K/UL (ref 0.3–1)
MONOCYTES NFR BLD: 13.6 % (ref 4–15)
NEUTROPHILS # BLD AUTO: 5.3 K/UL (ref 1.8–7.7)
NEUTROPHILS NFR BLD: 62.4 % (ref 38–73)
NRBC BLD-RTO: 0 /100 WBC
PHOSPHATE SERPL-MCNC: 3.7 MG/DL (ref 2.7–4.5)
PLATELET # BLD AUTO: 166 K/UL (ref 150–450)
PMV BLD AUTO: 11 FL (ref 9.2–12.9)
POCT GLUCOSE: 119 MG/DL (ref 70–110)
POTASSIUM SERPL-SCNC: 4.3 MMOL/L (ref 3.5–5.1)
PROT SERPL-MCNC: 6.5 G/DL (ref 6–8.4)
RBC # BLD AUTO: 3.82 M/UL (ref 4–5.4)
SODIUM SERPL-SCNC: 136 MMOL/L (ref 136–145)
WBC # BLD AUTO: 8.45 K/UL (ref 3.9–12.7)

## 2022-08-19 PROCEDURE — 36415 COLL VENOUS BLD VENIPUNCTURE: CPT

## 2022-08-19 PROCEDURE — 84100 ASSAY OF PHOSPHORUS: CPT

## 2022-08-19 PROCEDURE — 85025 COMPLETE CBC W/AUTO DIFF WBC: CPT

## 2022-08-19 PROCEDURE — 25000003 PHARM REV CODE 250: Performed by: INTERNAL MEDICINE

## 2022-08-19 PROCEDURE — 63700000 PHARM REV CODE 250 ALT 637 W/O HCPCS: Performed by: INTERNAL MEDICINE

## 2022-08-19 PROCEDURE — 25000003 PHARM REV CODE 250

## 2022-08-19 PROCEDURE — 63600175 PHARM REV CODE 636 W HCPCS: Performed by: INTERNAL MEDICINE

## 2022-08-19 PROCEDURE — 83735 ASSAY OF MAGNESIUM: CPT

## 2022-08-19 PROCEDURE — 80053 COMPREHEN METABOLIC PANEL: CPT

## 2022-08-19 RX ORDER — CLINDAMYCIN HYDROCHLORIDE 300 MG/1
300 CAPSULE ORAL EVERY 8 HOURS
Qty: 21 CAPSULE | Refills: 0 | Status: SHIPPED | OUTPATIENT
Start: 2022-08-19 | End: 2022-08-26

## 2022-08-19 RX ORDER — CEFADROXIL 500 MG/1
500 CAPSULE ORAL DAILY
Qty: 30 CAPSULE | Refills: 1 | Status: SHIPPED | OUTPATIENT
Start: 2022-08-27 | End: 2022-09-01 | Stop reason: SDUPTHER

## 2022-08-19 RX ORDER — POTASSIUM CHLORIDE 750 MG/1
10 TABLET, EXTENDED RELEASE ORAL DAILY
Start: 2022-08-19 | End: 2023-01-31

## 2022-08-19 RX ADMIN — Medication 1 EACH: at 08:08

## 2022-08-19 RX ADMIN — VANCOMYCIN HYDROCHLORIDE 1500 MG: 1.5 INJECTION, POWDER, LYOPHILIZED, FOR SOLUTION INTRAVENOUS at 12:08

## 2022-08-19 RX ADMIN — APIXABAN 5 MG: 2.5 TABLET, FILM COATED ORAL at 08:08

## 2022-08-19 RX ADMIN — FUROSEMIDE 40 MG: 40 TABLET ORAL at 08:08

## 2022-08-19 RX ADMIN — ATORVASTATIN CALCIUM 10 MG: 10 TABLET, FILM COATED ORAL at 08:08

## 2022-08-19 RX ADMIN — Medication 9 MG: at 12:08

## 2022-08-19 RX ADMIN — FLUCONAZOLE 200 MG: 100 TABLET ORAL at 08:08

## 2022-08-19 RX ADMIN — GABAPENTIN 800 MG: 400 CAPSULE ORAL at 08:08

## 2022-08-19 RX ADMIN — CLINDAMYCIN HYDROCHLORIDE 300 MG: 150 CAPSULE ORAL at 06:08

## 2022-08-19 RX ADMIN — HYDROCODONE BITARTRATE AND ACETAMINOPHEN 1 TABLET: 7.5; 325 TABLET ORAL at 10:08

## 2022-08-19 RX ADMIN — THERA TABS 1 TABLET: TAB at 08:08

## 2022-08-19 RX ADMIN — DOCUSATE SODIUM AND SENNOSIDES 1 TABLET: 8.6; 5 TABLET, FILM COATED ORAL at 08:08

## 2022-08-19 NOTE — PROGRESS NOTES
"Consult Note  Infectious Disease    Reason for Consult:      HPI: Nelly Tian is a 71 y.o. female  whom I had seen in May of 2019 with anterior abdominal wall cellulitis, she has a PMHx of obesity , BMI of 35.3, A-fib, CAD, MI, HTN, CHF, COPD, T2DM, COPD, the anterior abdominal wall cellulitis, comes back with the same.    She was in her usual state of health until a day POA.  She developed severe shaking chills, feeling hot and cold and she knew that her abdominal wall cellulitis was coming back. She noticed redness, warmth, and pain over the lower abdomen. Pain was severe, 7/10, she had an impressive temperature of 104°.  She was admitted and placed on vancomycin    Ultrasound of lower abdomen shows no fluid collection abscess.  She going to have NSAIDs because for she had received Tdap in May 2019.    I came and saw patient.  She is frustrated because she did not eat today as she had an ultrasound.  I resumed diet.  She also had some headache and was bleeding from Tylenol.  08/18/2022 Pleasant. In good spirits today. Abdomen is less painful, exxcept for one spot on hte right lateral side; less red  08/19/2022 Afebrile. normal wbc, improved      Antibiotics (From admission, onward)                Start     Stop Route Frequency Ordered    08/18/22 1200  vancomycin 1.5 g in dextrose 5 % 250 mL IVPB (ready to mix)         -- IV Every 12 hours (non-standard times) 08/18/22 1122    08/17/22 2200  clindamycin capsule 300 mg         -- Oral Every 8 hours 08/17/22 1908    08/17/22 0357  vancomycin - pharmacy to dose  (vancomycin IVPB)        "And" Linked Group Details    -- IV pharmacy to manage frequency 08/17/22 0257          Antifungals (From admission, onward)                Start     Stop Route Frequency Ordered    08/17/22 1915  fluconazole tablet 200 mg         08/20 0859 Oral Daily 08/17/22 1909          Antivirals (From admission, onward)      None              Review of patient's allergies indicates: "   Allergen Reactions    Dilaudid [hydromorphone] Anaphylaxis     Other reaction(s): Anaphylaxis  Other reaction(s): Unknown    Zyvox [linezolid in dextrose 5%] Shortness Of Breath     Past Medical History:   Diagnosis Date    *Atrial flutter     Angina pectoris 2017    Anxiety     Arthritis     Asthma     Atrial fibrillation     Back pain     Cataract     OD    CHF (congestive heart failure)     Chronically on benzodiazepine therapy 2019    COPD (chronic obstructive pulmonary disease)     Depression     Diabetes mellitus     Emphysema of lung     Heart failure     Hepatomegaly 2/3/2016    Hernia     History of MI (myocardial infarction) 2016    Hypercapnic respiratory failure, chronic 2016    Hyperlipidemia     Hypertension     Iron deficiency anemia 2/3/2016    Myocardial infarction     Obesity     Peripheral vascular disease     Pneumonia     Polyneuropathy     Retinal detachment     OS    Septic shock 2017    Skin ulcer 3/18/2017    Tobacco dependence     Type II 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, not stated as uncontrolled(250.60)      Past Surgical History:   Procedure Laterality Date    BREAST BIOPSY Left 10 yrs ago    benign    CATARACT EXTRACTION Left     OS      SECTION      x2    EYE SURGERY      HYSTERECTOMY      partial    OOPHORECTOMY      one ovary conserved    RETINAL DETACHMENT SURGERY      buckle --OS    TONSILLECTOMY           Review of Systems:   + chills, fever, sweats,== resolved  No change in vision, loss of vision or diplopia  No sinus congestion, purulent nasal discharge, post nasal drip or facial pain  No pain in mouth or throat. No problems with teeth, gums.  No chest pain, palpitations, syncope  No cough, sputum production, shortness of breath, dyspnea on exertion, pleurisy, hemoptysis  No nausea, vomiting, diarrhea, constipation, blood in stool, or focal abd pain,  No dysphagia,  odynophagia  No dysuria, hematuria, strangury, retention, incontinence, nocturia, prostatism,   No vaginal discharge, genital ulcers, risk for STD  No swelling of joints, redness of joints, injuries, or new focal pain  No unusual headaches, dizziness, vertigo, numbness, paresthesias, neuropathy, falls  No anxiety, depression, substance abuse, sleep disturbance  + diabetes,  No thyroid, hypogonadal conditions  No bleeding, lymphadenopathy, anemia, malignancy, unusual bruising  No new rashes, lesions, or wounds  No TB exposure  No recent/prior steroids  Outdoor activities:  Travel: no  Implants: no  Antibiotic History: no    EXAM & DIAGNOSTICS REVIEWED:   Vitals:     Temp:  [96.9 °F (36.1 °C)-98 °F (36.7 °C)]   Temp: 96.9 °F (36.1 °C) (08/19/22 0416)  Pulse: 64 (08/19/22 0416)  Resp: 18 (08/19/22 0416)  BP: (!) 122/58 (08/19/22 0416)  SpO2: 97 % (08/19/22 0416)    Intake/Output Summary (Last 24 hours) at 8/19/2022 0652  Last data filed at 8/18/2022 1753  Gross per 24 hour   Intake 1300.05 ml   Output 700 ml   Net 600.05 ml       General:  In NAD. Alert and attentive, cooperative, comfortable. Obese  Eyes:  Anicteric,   EOMI  ENT:     Neck:  Supple,   Lungs:    Heart:     Abd:  Soft, NT, ND, normal BS, no masses or organomegaly appreciated.  :  Voids/Harding, urine clear, no flank tenderness  Musc:  Joints without effusion, swelling, erythema, synovitis, muscle wasting.   Skin:  + cellulitis to anterior abdomen, almost gone; + yeast rash to groin.    Nice improvement  . No palmar or plantar lesions. No subungual petechiae  Neuro:   Alert, attentive, speech fluent, face symmetric, moves all extremities, no focal weakness. Ambulatory  Psych: Calm, cooperative  Lymphatic:     No cervical, supraclavicular, axillary, or inguinal nodes  Extrem: No edema, erythema, phlebitis, cellulitis, warm and well perfused  VAD:       Isolation:    Wound:         Lines/Tubes/Drains:    General Labs reviewed:  Recent Labs   Lab  08/17/22  0254 08/18/22 0456 08/19/22  0421   WBC 19.99* 12.81* 8.45   HGB 13.5 11.6* 11.7*   HCT 42.8 36.4* 36.4*    160 166       Recent Labs   Lab 08/16/22 2251 08/17/22 0254 08/18/22 0456    136 136   K 5.3* 4.4 4.3    102 100   CO2 27 21* 27   BUN 24* 24* 14   CREATININE 0.7 0.7 0.6   CALCIUM 10.0 9.2 8.9   PROT 8.3 7.2 6.4   BILITOT 0.5 0.8 0.5   ALKPHOS 125 103 82   ALT 30 27 19   AST 32 29 17     Recent Labs   Lab 08/18/22 0456   .8*     Micro:  Microbiology Results (last 7 days)       Procedure Component Value Units Date/Time    Blood culture x two cultures. Draw prior to antibiotics. [[id number]] Collected: 08/16/22 2250    Order Status: Completed Specimen: Blood Updated: 08/18/22 1212     Blood Culture, Routine No Growth to date      No Growth to date    Narrative:      Aerobic and anaerobic    Blood culture x two cultures. Draw prior to antibiotics. [[id number]] Collected: 08/16/22 2250    Order Status: Completed Specimen: Blood from Antecubital, Right Hand Updated: 08/18/22 1212     Blood Culture, Routine No Growth to date      No Growth to date    Narrative:      Aerobic and anaerobic          Imaging Reviewed:   CXR   CT    Cardiology:    IMPRESSION & PLAN     1. Severe anterior abdominal wall cellulitis. Improving    2. PMHx of obesity , BMI of 35.3, A-fib, CAD, MI, HTN, CHF, COPD, T2DM,A1c 5.1 COPD,    This patient is high risk for life-threatening deterioration and death secondary to above comorbidities and need for IV treatment      Recommendations:    Ok to go home on clindamycin 300 mg QID for 7days( then chased by cefadroxil rx  500 mg once a day as prevention, #60     Elevate her abdomen on pillows to reduce swelling    Separate the skin folds of her abdomen,  thigh and groin to avoid trapping moisture and maintaining antifungal ointment/cream to reduce this as a portal of entry.           Medical Decision Making during this encounter was  [_] Low Complexity  [_]  Moderate Complexity  [ xx ] High Complexity

## 2022-08-19 NOTE — DISCHARGE SUMMARY
"[facility] Hospital Medicine  Discharge Summary      Patient Name: eNlly Tian  MRN: [id number]  Patient Class: IP- Inpatient  Admission Date: 8/16/2022  Hospital Length of Stay: 2 days  Discharge Date and Time:  08/19/2022 9:05 AM  Attending Physician: Ree Corrales MD   Discharging Provider: Ree Corrales MD  Primary Care Provider: Constantine Bird MD      HPI:   Nelly Tian is a 71 y.o. y.o. female with a PMHx of A-fib, CHF, COPD, T2DM, CHF, COPD, and septic shock with numerous episodes of abdominal cellulitis who presented to the ED with abdominal cellulitis and abdominal pain onset x 1 day. She described lower abdominal pain as a sharp sensation that did not radiate. Denies alleviating or exacerbating factors. Pain was 10/10 at its worst and denies pain currently. She reports Hx of cellulitis to abdomen and states this is similar to previous episodes. She also reports fever and chills. She denies fatigue, CP, palpitations, SOB, cough, n/v/d, and changes in urination. ED workup was significant for elevated WBC, fever, and mild hyperkalemia. Hospital Medicine was consulted for further workup and management of the patient.           * No surgery found *      Hospital Course:   Patient was started on IV antibiotics. Patient encouraged to elevate extremity.  Patient was closely followed by ID specialist.  Routine wound care continued. Patient's symptoms improved and patient transitioned to PO antibiotics.  Patient was offered home health services and physical therapy which patient declined.  Patient to continue close follow-up with her provider and ID specialist upon discharge.      Goals of Care Treatment Preferences:  Code Status: DNR    Living Will: Yes          Consults:   Consults (From admission, onward)        Status Ordering Provider     Pharmacy to dose Vancomycin consult  Once        Provider:  (Not yet assigned)   "And" Linked Group Details    Acknowledged JHONATAN ALFORD "     Inpatient consult to Infectious Diseases  Once        Provider:  MD Zuleyka Mitchell ANNA C.        CXR: Cardiomegaly.  No acute process.     US abdomen:   Cellulitis of the left lower abdominal wall without an abscess seen    Final Active Diagnoses:    Diagnosis Date Noted POA    PRINCIPAL PROBLEM:  Sepsis [A41.9] 09/23/2018 Yes    Cellulitis of abdominal wall [L03.311] 05/04/2019 Yes    Chronic combined systolic and diastolic CHF (congestive heart failure) [I50.42] 08/23/2018 Yes    Hyperkalemia [E87.5] 04/23/2017 Yes    COPD (chronic obstructive pulmonary disease) [J44.9] 04/05/2017 Yes    Hyperlipidemia associated with type 2 diabetes mellitus [E11.69, E78.5] 11/02/2016 Yes    ACP (advance care planning) [Z71.89] 11/02/2016 Not Applicable    Hypertension associated with diabetes [E11.59, I15.2] 01/19/2016 Yes    Chronic atrial fibrillation [I48.20] 11/10/2015 Yes    Long term current use of anticoagulant therapy [Z79.01] 10/03/2012 Not Applicable    Diabetes mellitus with neuropathy [E11.40] 08/13/2012 Yes     Chronic    Severe obesity (BMI 35.0-35.9 with comorbidity) [E66.01, Z68.35] 08/08/2012 Not Applicable      Problems Resolved During this Admission:       Discharged Condition: good    Disposition: Home or Self Care    Follow Up:   Follow-up Information     Constantine Bird MD Follow up.    Specialty: Family Medicine  Contact information:  [address] [facility]  [place] [address]  [phone]             Melina Juan MD Follow up.    Specialty: Infectious Diseases  Contact information:  [address] [facility]  [address]  [place] [address]  [phone]                       Patient Instructions:      Diet Cardiac     Diet diabetic     Notify your health care provider if you experience any of the following:  temperature >100.4     Notify your health care provider if you experience any of the following:  severe uncontrolled pain     Notify your health care provider if you experience any of the  following:  redness, tenderness, or signs of infection (pain, swelling, redness, odor or green/yellow discharge around incision site)     Notify your health care provider if you experience any of the following:  persistent dizziness, light-headedness, or visual disturbances     Notify your health care provider if you experience any of the following:  increased confusion or weakness     Activity as tolerated   Order Comments: Up with assist, observe fall precautions       Significant Diagnostic Studies: Labs:   CMP   Recent Labs   Lab 08/18/22 0456 08/19/22  0421    136   K 4.3 4.3    98   CO2 27 29   GLU 82 74   BUN 14 15   CREATININE 0.6 0.6   CALCIUM 8.9 9.5   PROT 6.4 6.5   ALBUMIN 2.9* 2.8*   BILITOT 0.5 0.4   ALKPHOS 82 97   AST 17 21   ALT 19 22   ANIONGAP 9 9   , CBC   Recent Labs   Lab 08/18/22 0456 08/19/22  0421   WBC 12.81* 8.45   HGB 11.6* 11.7*   HCT 36.4* 36.4*    166    and INR   Lab Results   Component Value Date    INR 1.0 06/27/2019    INR 1.1 09/23/2018    INR 1.0 08/22/2018       Pending Diagnostic Studies:     None         Medications:  Reconciled Home Medications:      Medication List      START taking these medications    cefadroxil 500 MG Cap  Commonly known as: DURICEF  Take 1 capsule (500 mg total) by mouth once daily.  Start taking on: August 27, 2022     clindamycin 300 MG capsule  Commonly known as: CLEOCIN  Take 1 capsule (300 mg total) by mouth every 8 (eight) hours. for 7 days        CHANGE how you take these medications    potassium chloride SA 10 MEQ tablet  Commonly known as: K-DUR,KLOR-CON M  Take 1 tablet (10 mEq total) by mouth once daily.  What changed:   · medication strength  · how much to take        CONTINUE taking these medications    ascorbic acid (vitamin C) 500 MG tablet  Commonly known as: VITAMIN C  Take 2 tablets (1,000 mg total) by mouth every evening.     atorvastatin 10 MG tablet  Commonly known as: LIPITOR  Take 1 tablet (10 mg total) by  mouth every other day.     ELIQUIS 5 mg Tab  Generic drug: apixaban  TAKE 1 TABLET TWICE DAILY     empagliflozin 10 mg tablet  Commonly known as: JARDIANCE  Take 1 tablet (10 mg total) by mouth once daily.     fluticasone propionate 50 mcg/actuation nasal spray  Commonly known as: FLONASE  SHAKE LIQUID AND USE 1 SPRAY(50 MCG) IN EACH NOSTRIL EVERY DAY     fluticasone-umeclidin-vilanter 100-62.5-25 mcg Dsdv  Commonly known as: TRELEGY ELLIPTA  INHALE 1 PUFF INTO THE LUNGS ONE TIME DAILY     furosemide 40 MG tablet  Commonly known as: LASIX  TAKE 1 TABLET(40 MG) BY MOUTH EVERY DAY     gabapentin 800 MG tablet  Commonly known as: NEURONTIN  Take 1 tablet (800 mg total) by mouth 3 (three) times daily.     HYDROcodone-acetaminophen 7.5-325 mg per tablet  Commonly known as: NORCO  Take 1 tablet by mouth every 12 (twelve) hours as needed for Pain. Medical necessary for greater than 7 days for chronic pain. Hydrocodone is contraindicated with Klonopin.     levalbuterol 0.63 mg/3 mL nebulizer solution  Commonly known as: XOPENEX  USE 1 VIAL VIA NEBULIZER FOUR TIMES DAILY     LIDOcaine 5 %  Commonly known as: LIDODERM  APPLY PATCH ONTO SKIN.REMOVE AND DISCARD PATCH WITHIN 12 HOURS OR AS DIRECTED BY MD     metFORMIN 500 MG tablet  Commonly known as: GLUCOPHAGE  TAKE 1 TABLET (500 MG TOTAL) BY MOUTH DAILY WITH BREAKFAST.     multivitamin tablet  Commonly known as: THERAGRAN  Take 1 tablet by mouth once daily.     nystatin powder  Commonly known as: NYSTOP  APPLY TOPICALLY TWICE DAILY     nystatin-triamcinolone cream  Commonly known as: MYCOLOG II  Apply topically 3 (three) times daily. to affected area     ondansetron 8 MG Tbdl  Commonly known as: ZOFRAN-ODT  DISSOLVE 1 TABLET ON THE TONGUE EVERY 8 HOURS AS NEEDED     spironolactone 50 MG tablet  Commonly known as: ALDACTONE  Take 1 tablet (50 mg total) by mouth once daily.            Indwelling Lines/Drains at time of discharge:   Lines/Drains/Airways     None                  Time spent on the discharge of patient: 33 minutes         Ree Corrales MD  Department of Hospital Medicine  [facility]

## 2022-08-19 NOTE — PLAN OF CARE
Pt clear for DC from case management standpoint. Discharging to home.        Pt declined  services       08/19/22 1124   Final Note   Assessment Type Final Discharge Note   Anticipated Discharge Disposition Home   Hospital Resources/Appts/Education Provided Appointments scheduled and added to AVS

## 2022-08-19 NOTE — PLAN OF CARE
Unable to schedule hospital follow up within 7 days- requested assistance from Nohemy VELASQUEZ with clinic    In basket message sent to Dr. Juan's office requesting follow up apt to be scheduled within 2-3 weeks

## 2022-08-19 NOTE — DISCHARGE INSTRUCTIONS
Please keep your abdominal folds open to air and dry and apply antifungal powder for redness on as needed basis

## 2022-08-19 NOTE — PROGRESS NOTES
"Consult Note  Infectious Disease    Reason for Consult:      HPI: Nelly Tian is a 71 y.o. female  whom I had seen in May of 2019 with anterior abdominal wall cellulitis, she has a PMHx of obesity , BMI of 35.3, A-fib, CAD, MI, HTN, CHF, COPD, T2DM, COPD, the anterior abdominal wall cellulitis, comes back with the same.    She was in her usual state of health until a day POA.  She developed severe shaking chills, feeling hot and cold and she knew that her abdominal wall cellulitis was coming back. She noticed redness, warmth, and pain over the lower abdomen. Pain was severe, 7/10, she had an impressive temperature of 104°.  She was admitted and placed on vancomycin    Ultrasound of lower abdomen shows no fluid collection abscess.  She going to have NSAIDs because for she had received Tdap in May 2019.    I came and saw patient.  She is frustrated because she did not eat today as she had an ultrasound.  I resumed diet.  She also had some headache and was bleeding from Tylenol.  08/18/2022 Pleasant. In good spirits today. Abdomen is less painful, exxcept for one spot on hte right lateral side; less red       Antibiotics (From admission, onward)            Start     Stop Route Frequency Ordered    08/18/22 1200  vancomycin 1.5 g in dextrose 5 % 250 mL IVPB (ready to mix)         -- IV Every 12 hours (non-standard times) 08/18/22 1122    08/17/22 2200  clindamycin capsule 300 mg         -- Oral Every 8 hours 08/17/22 1908    08/17/22 0357  vancomycin - pharmacy to dose  (vancomycin IVPB)        "And" Linked Group Details    -- IV pharmacy to manage frequency 08/17/22 0257        Antifungals (From admission, onward)            Start     Stop Route Frequency Ordered    08/17/22 1915  fluconazole tablet 200 mg         08/20 0859 Oral Daily 08/17/22 1909        Antivirals (From admission, onward)    None            Review of patient's allergies indicates:   Allergen Reactions    Dilaudid [hydromorphone] " Anaphylaxis     Other reaction(s): Anaphylaxis  Other reaction(s): Unknown    Zyvox [linezolid in dextrose 5%] Shortness Of Breath     Past Medical History:   Diagnosis Date    *Atrial flutter     Angina pectoris 2017    Anxiety     Arthritis     Asthma     Atrial fibrillation     Back pain     Cataract     OD    CHF (congestive heart failure)     Chronically on benzodiazepine therapy 2019    COPD (chronic obstructive pulmonary disease)     Depression     Diabetes mellitus     Emphysema of lung     Heart failure     Hepatomegaly 2/3/2016    Hernia     History of MI (myocardial infarction) 2016    Hypercapnic respiratory failure, chronic 2016    Hyperlipidemia     Hypertension     Iron deficiency anemia 2/3/2016    Myocardial infarction     Obesity     Peripheral vascular disease     Pneumonia     Polyneuropathy     Retinal detachment     OS    Septic shock 2017    Skin ulcer 3/18/2017    Tobacco dependence     Type II 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, not stated as uncontrolled(250.60)      Past Surgical History:   Procedure Laterality Date    BREAST BIOPSY Left 10 yrs ago    benign    CATARACT EXTRACTION Left     OS      SECTION      x2    EYE SURGERY      HYSTERECTOMY      partial    OOPHORECTOMY      one ovary conserved    RETINAL DETACHMENT SURGERY      buckle --OS    TONSILLECTOMY           Review of Systems:   + chills, fever, sweats,== resolved  No change in vision, loss of vision or diplopia  No sinus congestion, purulent nasal discharge, post nasal drip or facial pain  No pain in mouth or throat. No problems with teeth, gums.  No chest pain, palpitations, syncope  No cough, sputum production, shortness of breath, dyspnea on exertion, pleurisy, hemoptysis  No nausea, vomiting, diarrhea, constipation, blood in stool, or focal abd pain,  No dysphagia, odynophagia  No dysuria, hematuria, strangury,  retention, incontinence, nocturia, prostatism,   No vaginal discharge, genital ulcers, risk for STD  No swelling of joints, redness of joints, injuries, or new focal pain  No unusual headaches, dizziness, vertigo, numbness, paresthesias, neuropathy, falls  No anxiety, depression, substance abuse, sleep disturbance  + diabetes,  No thyroid, hypogonadal conditions  No bleeding, lymphadenopathy, anemia, malignancy, unusual bruising  No new rashes, lesions, or wounds  No TB exposure  No recent/prior steroids  Outdoor activities:  Travel: no  Implants: no  Antibiotic History: no    EXAM & DIAGNOSTICS REVIEWED:   Vitals:     Temp:  [96.9 °F (36.1 °C)-98 °F (36.7 °C)]   Temp: 96.9 °F (36.1 °C) (08/19/22 0416)  Pulse: 64 (08/19/22 0416)  Resp: 18 (08/19/22 0416)  BP: (!) 122/58 (08/19/22 0416)  SpO2: 97 % (08/19/22 0416)    Intake/Output Summary (Last 24 hours) at 8/19/2022 0648  Last data filed at 8/18/2022 1753  Gross per 24 hour   Intake 1300.05 ml   Output 700 ml   Net 600.05 ml       General:  In NAD. Alert and attentive, cooperative, comfortable. Obese  Eyes:  Anicteric,   EOMI  ENT:     Neck:  Supple,   Lungs:    Heart:     Abd:  Soft, NT, ND, normal BS, no masses or organomegaly appreciated.  :  Voids/Harding, urine clear, no flank tenderness  Musc:  Joints without effusion, swelling, erythema, synovitis, muscle wasting.   Skin:  + cellulitis to anterior abdomen; + yeast rash to groin.    Nice improvement  . No palmar or plantar lesions. No subungual petechiae  Neuro:   Alert, attentive, speech fluent, face symmetric, moves all extremities, no focal weakness. Ambulatory  Psych: Calm, cooperative  Lymphatic:     No cervical, supraclavicular, axillary, or inguinal nodes  Extrem: No edema, erythema, phlebitis, cellulitis, warm and well perfused  VAD:       Isolation:    Wound:         Lines/Tubes/Drains:    General Labs reviewed:  Recent Labs   Lab 08/17/22  0254 08/18/22  0456 08/19/22  0421   WBC 19.99* 12.81* 8.45    HGB 13.5 11.6* 11.7*   HCT 42.8 36.4* 36.4*    160 166       Recent Labs   Lab 08/16/22 2251 08/17/22  0254 08/18/22 0456    136 136   K 5.3* 4.4 4.3    102 100   CO2 27 21* 27   BUN 24* 24* 14   CREATININE 0.7 0.7 0.6   CALCIUM 10.0 9.2 8.9   PROT 8.3 7.2 6.4   BILITOT 0.5 0.8 0.5   ALKPHOS 125 103 82   ALT 30 27 19   AST 32 29 17     Recent Labs   Lab 08/18/22  0456   .8*     Micro:  Microbiology Results (last 7 days)     Procedure Component Value Units Date/Time    Blood culture x two cultures. Draw prior to antibiotics. [[id number]] Collected: 08/16/22 2250    Order Status: Completed Specimen: Blood Updated: 08/18/22 1212     Blood Culture, Routine No Growth to date      No Growth to date    Narrative:      Aerobic and anaerobic    Blood culture x two cultures. Draw prior to antibiotics. [[id number]] Collected: 08/16/22 2250    Order Status: Completed Specimen: Blood from Antecubital, Right Hand Updated: 08/18/22 1212     Blood Culture, Routine No Growth to date      No Growth to date    Narrative:      Aerobic and anaerobic        Imaging Reviewed:   CXR   CT    Cardiology:    IMPRESSION & PLAN     1. Severe anterior abdominal wall cellulitis.I mproving    2. PMHx of obesity , BMI of 35.3, A-fib, CAD, MI, HTN, CHF, COPD, T2DM,A1c 5.1 COPD,    This patient is high risk for life-threatening deterioration and death secondary to above comorbidities and need for IV treatment    Recommendations:  Vancomycin + Clindamycin p.o.  She is up-to-date with tetanus vaccine  I cannot give NSAIDs bc she is on anticoagulation  Probiotic  A few doses of Diflucan for yeast in bilateral groin which I believe has precipitated the cellulitis    Hopefully she can go home tomorrow        Medical Decision Making during this encounter was  [_] Low Complexity  [_] Moderate Complexity  [ xx ] High Complexity

## 2022-08-22 LAB
BACTERIA BLD CULT: NORMAL
BACTERIA BLD CULT: NORMAL

## 2022-08-23 ENCOUNTER — PATIENT OUTREACH (OUTPATIENT)
Dept: ADMINISTRATIVE | Facility: CLINIC | Age: 72
End: 2022-08-23
Payer: MEDICARE

## 2022-08-23 NOTE — PROGRESS NOTES
C3 nurse attempted to contact patient for a TCC post hospital discharge follow-up call. Phone disconnected after 2 pt identifier. Called back went to . Patient have a hosp f/u on 08/29/2022 with Gayle Bullard @ 1020.    Message routed to Staff .

## 2022-08-24 ENCOUNTER — PATIENT MESSAGE (OUTPATIENT)
Dept: ADMINISTRATIVE | Facility: HOSPITAL | Age: 72
End: 2022-08-24
Payer: MEDICARE

## 2022-08-24 NOTE — PROGRESS NOTES
Second attempt  C3 nurse spoke with Nelly Tian for a TCC post hospital discharge follow up call. The patient has scheduled HOSFU on 8/29/2022 @ 1020 with  JIGNESH Su.

## 2022-08-25 ENCOUNTER — PATIENT MESSAGE (OUTPATIENT)
Dept: FAMILY MEDICINE | Facility: CLINIC | Age: 72
End: 2022-08-25
Payer: MEDICARE

## 2022-08-25 DIAGNOSIS — M51.36 LUMBAR DEGENERATIVE DISC DISEASE: ICD-10-CM

## 2022-08-25 DIAGNOSIS — F41.1 GAD (GENERALIZED ANXIETY DISORDER): Primary | ICD-10-CM

## 2022-08-25 NOTE — TELEPHONE ENCOUNTER
No new care gaps identified.  [facility] Embedded Care Gaps. Reference number: [id number]. 8/25/2022   11:13:52 AM CDT

## 2022-08-26 RX ORDER — HYDROCODONE BITARTRATE AND ACETAMINOPHEN 7.5; 325 MG/1; MG/1
1 TABLET ORAL EVERY 12 HOURS PRN
Qty: 60 TABLET | Refills: 0 | OUTPATIENT
Start: 2022-08-26 | End: 2022-09-25

## 2022-08-26 RX ORDER — CLONAZEPAM 0.5 MG/1
0.5 TABLET ORAL 2 TIMES DAILY PRN
Qty: 15 TABLET | Refills: 0 | Status: SHIPPED | OUTPATIENT
Start: 2022-08-26 | End: 2022-09-30

## 2022-09-01 ENCOUNTER — OUTSIDE PLACE OF SERVICE (OUTPATIENT)
Dept: INFECTIOUS DISEASES | Facility: CLINIC | Age: 72
End: 2022-09-01
Payer: MEDICARE

## 2022-09-01 ENCOUNTER — OFFICE VISIT (OUTPATIENT)
Dept: INFECTIOUS DISEASES | Facility: CLINIC | Age: 72
End: 2022-09-01
Payer: MEDICARE

## 2022-09-01 VITALS
TEMPERATURE: 98 F | WEIGHT: 228.63 LBS | HEART RATE: 63 BPM | SYSTOLIC BLOOD PRESSURE: 126 MMHG | HEIGHT: 68 IN | BODY MASS INDEX: 34.65 KG/M2 | OXYGEN SATURATION: 96 % | DIASTOLIC BLOOD PRESSURE: 68 MMHG

## 2022-09-01 DIAGNOSIS — A41.9 SEPSIS: Primary | ICD-10-CM

## 2022-09-01 DIAGNOSIS — B37.2 YEAST DERMATITIS: ICD-10-CM

## 2022-09-01 DIAGNOSIS — L03.311 ABDOMINAL WALL CELLULITIS: Primary | ICD-10-CM

## 2022-09-01 LAB — POCT GLUCOSE: 77 MG/DL (ref 70–110)

## 2022-09-01 PROCEDURE — 1159F MED LIST DOCD IN RCRD: CPT | Mod: CPTII,S$GLB,, | Performed by: INTERNAL MEDICINE

## 2022-09-01 PROCEDURE — 99213 PR OFFICE/OUTPT VISIT, EST, LEVL III, 20-29 MIN: ICD-10-PCS | Mod: S$GLB,,, | Performed by: INTERNAL MEDICINE

## 2022-09-01 PROCEDURE — 4010F ACE/ARB THERAPY RXD/TAKEN: CPT | Mod: CPTII,S$GLB,, | Performed by: INTERNAL MEDICINE

## 2022-09-01 PROCEDURE — 3066F PR DOCUMENTATION OF TREATMENT FOR NEPHROPATHY: ICD-10-PCS | Mod: CPTII,S$GLB,, | Performed by: INTERNAL MEDICINE

## 2022-09-01 PROCEDURE — 1157F ADVNC CARE PLAN IN RCRD: CPT | Mod: CPTII,S$GLB,, | Performed by: INTERNAL MEDICINE

## 2022-09-01 PROCEDURE — 99213 OFFICE O/P EST LOW 20 MIN: CPT | Mod: S$GLB,,, | Performed by: INTERNAL MEDICINE

## 2022-09-01 PROCEDURE — 4010F PR ACE/ARB THEARPY RXD/TAKEN: ICD-10-PCS | Mod: CPTII,S$GLB,, | Performed by: INTERNAL MEDICINE

## 2022-09-01 PROCEDURE — 3288F PR FALLS RISK ASSESSMENT DOCUMENTED: ICD-10-PCS | Mod: CPTII,S$GLB,, | Performed by: INTERNAL MEDICINE

## 2022-09-01 PROCEDURE — 3078F DIAST BP <80 MM HG: CPT | Mod: CPTII,S$GLB,, | Performed by: INTERNAL MEDICINE

## 2022-09-01 PROCEDURE — 3066F NEPHROPATHY DOC TX: CPT | Mod: CPTII,S$GLB,, | Performed by: INTERNAL MEDICINE

## 2022-09-01 PROCEDURE — 3008F BODY MASS INDEX DOCD: CPT | Mod: CPTII,S$GLB,, | Performed by: INTERNAL MEDICINE

## 2022-09-01 PROCEDURE — 3060F PR POS MICROALBUMINURIA RESULT DOCUMENTED/REVIEW: ICD-10-PCS | Mod: CPTII,S$GLB,, | Performed by: INTERNAL MEDICINE

## 2022-09-01 PROCEDURE — 1126F AMNT PAIN NOTED NONE PRSNT: CPT | Mod: CPTII,S$GLB,, | Performed by: INTERNAL MEDICINE

## 2022-09-01 PROCEDURE — 3044F HG A1C LEVEL LT 7.0%: CPT | Mod: CPTII,S$GLB,, | Performed by: INTERNAL MEDICINE

## 2022-09-01 PROCEDURE — 3008F PR BODY MASS INDEX (BMI) DOCUMENTED: ICD-10-PCS | Mod: CPTII,S$GLB,, | Performed by: INTERNAL MEDICINE

## 2022-09-01 PROCEDURE — 1101F PR PT FALLS ASSESS DOC 0-1 FALLS W/OUT INJ PAST YR: ICD-10-PCS | Mod: CPTII,S$GLB,, | Performed by: INTERNAL MEDICINE

## 2022-09-01 PROCEDURE — 1126F PR PAIN SEVERITY QUANTIFIED, NO PAIN PRESENT: ICD-10-PCS | Mod: CPTII,S$GLB,, | Performed by: INTERNAL MEDICINE

## 2022-09-01 PROCEDURE — 1159F PR MEDICATION LIST DOCUMENTED IN MEDICAL RECORD: ICD-10-PCS | Mod: CPTII,S$GLB,, | Performed by: INTERNAL MEDICINE

## 2022-09-01 PROCEDURE — 1157F PR ADVANCE CARE PLAN OR EQUIV PRESENT IN MEDICAL RECORD: ICD-10-PCS | Mod: CPTII,S$GLB,, | Performed by: INTERNAL MEDICINE

## 2022-09-01 PROCEDURE — 3074F PR MOST RECENT SYSTOLIC BLOOD PRESSURE < 130 MM HG: ICD-10-PCS | Mod: CPTII,S$GLB,, | Performed by: INTERNAL MEDICINE

## 2022-09-01 PROCEDURE — 3078F PR MOST RECENT DIASTOLIC BLOOD PRESSURE < 80 MM HG: ICD-10-PCS | Mod: CPTII,S$GLB,, | Performed by: INTERNAL MEDICINE

## 2022-09-01 PROCEDURE — 3060F POS MICROALBUMINURIA REV: CPT | Mod: CPTII,S$GLB,, | Performed by: INTERNAL MEDICINE

## 2022-09-01 PROCEDURE — 3044F PR MOST RECENT HEMOGLOBIN A1C LEVEL <7.0%: ICD-10-PCS | Mod: CPTII,S$GLB,, | Performed by: INTERNAL MEDICINE

## 2022-09-01 PROCEDURE — 1101F PT FALLS ASSESS-DOCD LE1/YR: CPT | Mod: CPTII,S$GLB,, | Performed by: INTERNAL MEDICINE

## 2022-09-01 PROCEDURE — 1111F PR DISCHARGE MEDS RECONCILED W/ CURRENT OUTPATIENT MED LIST: ICD-10-PCS | Mod: CPTII,S$GLB,, | Performed by: INTERNAL MEDICINE

## 2022-09-01 PROCEDURE — 1111F DSCHRG MED/CURRENT MED MERGE: CPT | Mod: CPTII,S$GLB,, | Performed by: INTERNAL MEDICINE

## 2022-09-01 PROCEDURE — 3074F SYST BP LT 130 MM HG: CPT | Mod: CPTII,S$GLB,, | Performed by: INTERNAL MEDICINE

## 2022-09-01 PROCEDURE — 3288F FALL RISK ASSESSMENT DOCD: CPT | Mod: CPTII,S$GLB,, | Performed by: INTERNAL MEDICINE

## 2022-09-01 RX ORDER — CEFADROXIL 500 MG/1
500 CAPSULE ORAL DAILY
Qty: 30 CAPSULE | Refills: 3 | Status: SHIPPED | OUTPATIENT
Start: 2022-09-01 | End: 2022-10-31

## 2022-09-01 NOTE — PROGRESS NOTES
Reason for consult:   Subjective:      Patient ID: Nelly Tian is a 71 y.o. female.    Chief Complaint:: hospital follow up visit     HPI  This is a 71-year-old female whom I saw in the hospital in May 2019 and in 2022 for abdominal wall cellulitis, panniculitis.  She has a past medical history of a flutter, AFib, CHF, COPD, DM, depression, emphysema, asthma, anxiety,  Anterior abdominal cellulitis is completely resolved.    There is peeling of the skin f following prior swelling.    She completed the treatment for cellulitis, now she is on prevention dose of 500.    The yeast in bilateral groin is under control.      Review of patient's allergies indicates:   Allergen Reactions    Dilaudid [hydromorphone] Anaphylaxis     Other reaction(s): Anaphylaxis  Other reaction(s): Unknown    Zyvox [linezolid in dextrose 5%] Shortness Of Breath     Past Medical History:   Diagnosis Date    *Atrial flutter     Angina pectoris 2017    Anxiety     Arthritis     Asthma     Atrial fibrillation     Back pain     Cataract     OD    CHF (congestive heart failure)     Chronically on benzodiazepine therapy 2019    COPD (chronic obstructive pulmonary disease)     Depression     Diabetes mellitus     Emphysema of lung     Heart failure     Hepatomegaly 2/3/2016    Hernia     History of MI (myocardial infarction) 2016    Hypercapnic respiratory failure, chronic 2016    Hyperlipidemia     Hypertension     Iron deficiency anemia 2/3/2016    Myocardial infarction     Obesity     Peripheral vascular disease     Pneumonia     Polyneuropathy     Retinal detachment     OS    Septic shock 2017    Skin ulcer 3/18/2017    Tobacco dependence     Type II 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, not stated as uncontrolled(250.60)      Past Surgical History:   Procedure Laterality Date    BREAST BIOPSY Left 10 yrs ago    benign    CATARACT EXTRACTION Left     OS       SECTION      x2    EYE SURGERY      HYSTERECTOMY      partial    OOPHORECTOMY      one ovary conserved    RETINAL DETACHMENT SURGERY      buckle --OS    TONSILLECTOMY       Social History     Tobacco Use    Smoking status: Former     Packs/day: 0.30     Years: 50.00     Pack years: 15.00     Types: Cigarettes     Start date: 1968     Quit date: 2022     Years since quittin.1    Smokeless tobacco: Never   Substance Use Topics    Alcohol use: No     Alcohol/week: 0.0 standard drinks        Hunting:  Fishing:  Pets:  Exposure to sick contacts:  Exposure to TB:  Travel:     Family History   Problem Relation Age of Onset    Arthritis Father     Heart disease Father     Early death Sister 30        heart disease    Heart disease Sister 32        MI    Cancer Brother         Lung cancer    No Known Problems Daughter     Blindness Son         due to accident//    Arthritis Sister     Heart disease Sister     Crohn's disease Sister     Alcohol abuse Mother     Breast cancer Neg Hx     Glaucoma Neg Hx     Macular degeneration Neg Hx     Retinal detachment Neg Hx     Amblyopia Neg Hx     Diabetes Neg Hx     Cataracts Neg Hx     Hypertension Neg Hx     Strabismus Neg Hx     Stroke Neg Hx     Thyroid disease Neg Hx        Review of Systems   Constitutional:  Negative for appetite change, chills, diaphoresis, fatigue, fever and unexpected weight change.   HENT:  Negative for congestion, dental problem, ear pain, hearing loss, mouth sores, nosebleeds, postnasal drip, rhinorrhea, sinus pain, sore throat and trouble swallowing.    Eyes:  Negative for photophobia, pain and visual disturbance.   Respiratory:  Negative for cough, choking, chest tightness and shortness of breath.    Cardiovascular:  Negative for chest pain, palpitations and leg swelling.   Gastrointestinal:  Negative for abdominal pain, anal bleeding, blood in stool, constipation, diarrhea, nausea, rectal pain and vomiting.   Endocrine: Negative for  "polydipsia and polyuria.   Genitourinary:  Negative for difficulty urinating, dysuria, flank pain, frequency, genital sores, hematuria, urgency and vaginal discharge.   Musculoskeletal:  Negative for arthralgias, back pain, joint swelling and myalgias.   Skin:  Negative for rash.   Allergic/Immunologic: Negative for environmental allergies, food allergies and immunocompromised state.   Neurological:  Negative for dizziness, syncope, speech difficulty, weakness, numbness and headaches.   Hematological:  Negative for adenopathy. Does not bruise/bleed easily.   Psychiatric/Behavioral:  Negative for dysphoric mood and sleep disturbance. The patient is not nervous/anxious.       Pertinent medications noted:     Objective:      Blood pressure 126/68, pulse 63, temperature 98.4 °F (36.9 °C), height 5' 8" (1.727 m), weight 103.7 kg (228 lb 9.6 oz), SpO2 96 %.  Body mass index is 34.76 kg/m².  Physical Exam  Constitutional:       General: She is not in acute distress.     Appearance: She is not diaphoretic.   HENT:      Head: Atraumatic.      Right Ear: External ear normal.      Left Ear: External ear normal.      Nose: Nose normal.   Eyes:      General: No scleral icterus.     Conjunctiva/sclera: Conjunctivae normal.      Pupils: Pupils are equal, round, and reactive to light.   Neck:      Vascular: No JVD.   Cardiovascular:      Rate and Rhythm: Normal rate and regular rhythm.      Heart sounds: Normal heart sounds.   Pulmonary:      Effort: Pulmonary effort is normal.      Breath sounds: Normal breath sounds. No wheezing or rales.   Chest:      Chest wall: No tenderness.   Abdominal:      General: Bowel sounds are normal. There is no distension.      Palpations: Abdomen is soft. There is no mass.      Tenderness: There is no abdominal tenderness. There is no guarding or rebound.   Musculoskeletal:         General: Normal range of motion.      Cervical back: Normal range of motion and neck supple.   Lymphadenopathy:      " Cervical: No cervical adenopathy.   Skin:     General: Skin is warm and dry.      Findings: No erythema or rash.   Neurological:      Mental Status: She is alert and oriented to person, place, and time.      Cranial Nerves: No cranial nerve deficit.   Psychiatric:         Behavior: Behavior normal.         Thought Content: Thought content normal.       VAD:   Abdominal cellulitis is resolved.  Some peeling of skin.  No yeast in groin bilaterally.        General Labs reviewed:  Lab Results   Component Value Date    WBC 8.45 08/19/2022    RBC 3.82 (L) 08/19/2022    HGB 11.7 (L) 08/19/2022    HCT 36.4 (L) 08/19/2022    MCV 95 08/19/2022    MCH 30.6 08/19/2022    MCHC 32.1 08/19/2022    RDW 13.2 08/19/2022     08/19/2022    MPV 11.0 08/19/2022    GRAN 5.3 08/19/2022    GRAN 62.4 08/19/2022    LYMPH 1.2 08/19/2022    LYMPH 13.6 (L) 08/19/2022    MONO 1.2 (H) 08/19/2022    MONO 13.6 08/19/2022    EOS 0.8 (H) 08/19/2022    BASO 0.04 08/19/2022    EOSINOPHIL 9.5 (H) 08/19/2022    BASOPHIL 0.5 08/19/2022     CMP  Sodium   Date Value Ref Range Status   08/19/2022 136 136 - 145 mmol/L Final     Potassium   Date Value Ref Range Status   08/19/2022 4.3 3.5 - 5.1 mmol/L Final     Chloride   Date Value Ref Range Status   08/19/2022 98 95 - 110 mmol/L Final     CO2   Date Value Ref Range Status   08/19/2022 29 23 - 29 mmol/L Final     Glucose   Date Value Ref Range Status   08/19/2022 74 70 - 110 mg/dL Final     BUN   Date Value Ref Range Status   08/19/2022 15 8 - 23 mg/dL Final     Creatinine   Date Value Ref Range Status   08/19/2022 0.6 0.5 - 1.4 mg/dL Final     Calcium   Date Value Ref Range Status   08/19/2022 9.5 8.7 - 10.5 mg/dL Final     Total Protein   Date Value Ref Range Status   08/19/2022 6.5 6.0 - 8.4 g/dL Final     Albumin   Date Value Ref Range Status   08/19/2022 2.8 (L) 3.5 - 5.2 g/dL Final     Total Bilirubin   Date Value Ref Range Status   08/19/2022 0.4 0.1 - 1.0 mg/dL Final     Comment:     For infants  and newborns, interpretation of results should be based  on gestational age, weight and in agreement with clinical  observations.    Premature Infant recommended reference ranges:  Up to 24 hours.............<8.0 mg/dL  Up to 48 hours............<12.0 mg/dL  3-5 days..................<15.0 mg/dL  6-29 days.................<15.0 mg/dL       Alkaline Phosphatase   Date Value Ref Range Status   08/19/2022 97 55 - 135 U/L Final     AST   Date Value Ref Range Status   08/19/2022 21 10 - 40 U/L Final     ALT   Date Value Ref Range Status   08/19/2022 22 10 - 44 U/L Final     Anion Gap   Date Value Ref Range Status   08/19/2022 9 8 - 16 mmol/L Final     eGFR if    Date Value Ref Range Status   07/22/2022 >60.0 >60 mL/min/1.73 m^2 Final     eGFR if non    Date Value Ref Range Status   07/22/2022 >60.0 >60 mL/min/1.73 m^2 Final     Comment:     Calculation used to obtain the estimated glomerular filtration  rate (eGFR) is the CKD-EPI equation.          Micro:  Microbiology Results (last 7 days)       ** No results found for the last 168 hours. **          Imaging Reviewed:    Assessment:       1. Abdominal wall cellulitis    2. Yeast dermatitis           Plan:       Abdominal wall cellulitis  Comments:  Resolved.  Completed treatment.    Will give cefadroxil 500 daily for prevention  Patient is aware to call me if there is a recurrence  Orders:  -     cefadroxil (DURICEF) 500 MG Cap; Take 1 capsule (500 mg total) by mouth once daily.  Dispense: 30 capsule; Refill: 3    Yeast dermatitis  Comments:  To groin bilaterally when she was in the hospital.    It is resolved.    Asked patient to call me if it recurs.  I am considering placing her on diflucan weekly    Follow up if symptoms worsen or fail to improve.            This note was created using Dragon voice recognition software that occasionally misinterpreted phrases or words.

## 2022-09-13 ENCOUNTER — PATIENT MESSAGE (OUTPATIENT)
Dept: FAMILY MEDICINE | Facility: CLINIC | Age: 72
End: 2022-09-13
Payer: MEDICARE

## 2022-09-13 ENCOUNTER — OFFICE VISIT (OUTPATIENT)
Dept: PAIN MEDICINE | Facility: CLINIC | Age: 72
End: 2022-09-13
Payer: MEDICARE

## 2022-09-13 VITALS
HEIGHT: 68 IN | BODY MASS INDEX: 34.56 KG/M2 | SYSTOLIC BLOOD PRESSURE: 111 MMHG | WEIGHT: 228 LBS | HEART RATE: 58 BPM | DIASTOLIC BLOOD PRESSURE: 60 MMHG

## 2022-09-13 DIAGNOSIS — M47.896 OTHER SPONDYLOSIS, LUMBAR REGION: Primary | ICD-10-CM

## 2022-09-13 DIAGNOSIS — G89.4 CHRONIC PAIN SYNDROME: ICD-10-CM

## 2022-09-13 DIAGNOSIS — M43.17 SPONDYLOLISTHESIS OF LUMBOSACRAL REGION: ICD-10-CM

## 2022-09-13 DIAGNOSIS — M51.36 LUMBAR DEGENERATIVE DISC DISEASE: ICD-10-CM

## 2022-09-13 PROCEDURE — 4010F PR ACE/ARB THEARPY RXD/TAKEN: ICD-10-PCS | Mod: CPTII,S$GLB,, | Performed by: ANESTHESIOLOGY

## 2022-09-13 PROCEDURE — 3078F DIAST BP <80 MM HG: CPT | Mod: CPTII,S$GLB,, | Performed by: ANESTHESIOLOGY

## 2022-09-13 PROCEDURE — 3008F BODY MASS INDEX DOCD: CPT | Mod: CPTII,S$GLB,, | Performed by: ANESTHESIOLOGY

## 2022-09-13 PROCEDURE — 1159F MED LIST DOCD IN RCRD: CPT | Mod: CPTII,S$GLB,, | Performed by: ANESTHESIOLOGY

## 2022-09-13 PROCEDURE — 3288F FALL RISK ASSESSMENT DOCD: CPT | Mod: CPTII,S$GLB,, | Performed by: ANESTHESIOLOGY

## 2022-09-13 PROCEDURE — 1157F PR ADVANCE CARE PLAN OR EQUIV PRESENT IN MEDICAL RECORD: ICD-10-PCS | Mod: CPTII,S$GLB,, | Performed by: ANESTHESIOLOGY

## 2022-09-13 PROCEDURE — 4010F ACE/ARB THERAPY RXD/TAKEN: CPT | Mod: CPTII,S$GLB,, | Performed by: ANESTHESIOLOGY

## 2022-09-13 PROCEDURE — 3288F PR FALLS RISK ASSESSMENT DOCUMENTED: ICD-10-PCS | Mod: CPTII,S$GLB,, | Performed by: ANESTHESIOLOGY

## 2022-09-13 PROCEDURE — 99204 PR OFFICE/OUTPT VISIT, NEW, LEVL IV, 45-59 MIN: ICD-10-PCS | Mod: S$GLB,,, | Performed by: ANESTHESIOLOGY

## 2022-09-13 PROCEDURE — 1125F AMNT PAIN NOTED PAIN PRSNT: CPT | Mod: CPTII,S$GLB,, | Performed by: ANESTHESIOLOGY

## 2022-09-13 PROCEDURE — 1157F ADVNC CARE PLAN IN RCRD: CPT | Mod: CPTII,S$GLB,, | Performed by: ANESTHESIOLOGY

## 2022-09-13 PROCEDURE — 3044F HG A1C LEVEL LT 7.0%: CPT | Mod: CPTII,S$GLB,, | Performed by: ANESTHESIOLOGY

## 2022-09-13 PROCEDURE — 3074F PR MOST RECENT SYSTOLIC BLOOD PRESSURE < 130 MM HG: ICD-10-PCS | Mod: CPTII,S$GLB,, | Performed by: ANESTHESIOLOGY

## 2022-09-13 PROCEDURE — 99999 PR PBB SHADOW E&M-EST. PATIENT-LVL IV: CPT | Mod: PBBFAC,,, | Performed by: ANESTHESIOLOGY

## 2022-09-13 PROCEDURE — 3066F NEPHROPATHY DOC TX: CPT | Mod: CPTII,S$GLB,, | Performed by: ANESTHESIOLOGY

## 2022-09-13 PROCEDURE — 3060F POS MICROALBUMINURIA REV: CPT | Mod: CPTII,S$GLB,, | Performed by: ANESTHESIOLOGY

## 2022-09-13 PROCEDURE — 99999 PR PBB SHADOW E&M-EST. PATIENT-LVL IV: ICD-10-PCS | Mod: PBBFAC,,, | Performed by: ANESTHESIOLOGY

## 2022-09-13 PROCEDURE — 1101F PR PT FALLS ASSESS DOC 0-1 FALLS W/OUT INJ PAST YR: ICD-10-PCS | Mod: CPTII,S$GLB,, | Performed by: ANESTHESIOLOGY

## 2022-09-13 PROCEDURE — 1159F PR MEDICATION LIST DOCUMENTED IN MEDICAL RECORD: ICD-10-PCS | Mod: CPTII,S$GLB,, | Performed by: ANESTHESIOLOGY

## 2022-09-13 PROCEDURE — 3074F SYST BP LT 130 MM HG: CPT | Mod: CPTII,S$GLB,, | Performed by: ANESTHESIOLOGY

## 2022-09-13 PROCEDURE — 3078F PR MOST RECENT DIASTOLIC BLOOD PRESSURE < 80 MM HG: ICD-10-PCS | Mod: CPTII,S$GLB,, | Performed by: ANESTHESIOLOGY

## 2022-09-13 PROCEDURE — 99204 OFFICE O/P NEW MOD 45 MIN: CPT | Mod: S$GLB,,, | Performed by: ANESTHESIOLOGY

## 2022-09-13 PROCEDURE — 1125F PR PAIN SEVERITY QUANTIFIED, PAIN PRESENT: ICD-10-PCS | Mod: CPTII,S$GLB,, | Performed by: ANESTHESIOLOGY

## 2022-09-13 PROCEDURE — 1101F PT FALLS ASSESS-DOCD LE1/YR: CPT | Mod: CPTII,S$GLB,, | Performed by: ANESTHESIOLOGY

## 2022-09-13 PROCEDURE — 3066F PR DOCUMENTATION OF TREATMENT FOR NEPHROPATHY: ICD-10-PCS | Mod: CPTII,S$GLB,, | Performed by: ANESTHESIOLOGY

## 2022-09-13 PROCEDURE — 1111F PR DISCHARGE MEDS RECONCILED W/ CURRENT OUTPATIENT MED LIST: ICD-10-PCS | Mod: CPTII,S$GLB,, | Performed by: ANESTHESIOLOGY

## 2022-09-13 PROCEDURE — 1111F DSCHRG MED/CURRENT MED MERGE: CPT | Mod: CPTII,S$GLB,, | Performed by: ANESTHESIOLOGY

## 2022-09-13 PROCEDURE — 3008F PR BODY MASS INDEX (BMI) DOCUMENTED: ICD-10-PCS | Mod: CPTII,S$GLB,, | Performed by: ANESTHESIOLOGY

## 2022-09-13 PROCEDURE — 3060F PR POS MICROALBUMINURIA RESULT DOCUMENTED/REVIEW: ICD-10-PCS | Mod: CPTII,S$GLB,, | Performed by: ANESTHESIOLOGY

## 2022-09-13 PROCEDURE — 3044F PR MOST RECENT HEMOGLOBIN A1C LEVEL <7.0%: ICD-10-PCS | Mod: CPTII,S$GLB,, | Performed by: ANESTHESIOLOGY

## 2022-09-13 NOTE — PROGRESS NOTES
Referring Physician: Constantine Bird MD    PCP: Constantine Bird MD    CC: back pain    HPI:   Nelly Tian is a 71 y.o. female with PMH significant for CHF (EF 25%), atrial fibrillation on Eliquis, DM II, HTN, and COPD presents as a referral for the evaluation of back pain. The patient reports that her pain began approximately 7 years ago after no inciting incident or trauma. The patient denies of improvement since onset. The patient localizes her pain to the left side of her lower back. The patient reports denies of radiation of her back pain. The patient describes her pain as a sharp type of pain. The patient denies of numbness. The patient reports that her pain is a 6/10. Patient denies of any urinary/fecal incontinence, saddle anesthesia, or weakness.     Aggravating factors: walking, prolonged standing, activity in general    Mitigating factors: sitting, pain medications    Relevant Surgeries: no    Interventional Therapies: no    : Reviewed and consistent with medication use as prescribed.      Non-pharmacologic Treatment:     Physical Therapy: no; patient refuses given co-morbidities  Ice/Heat: yes; uses heat  TENS: no  Massage: no  Chiropractic care: no  Acupuncture: no         Pain Medications:         Currently taking: Norco 7.5-325 mg PO BID, gabapentin 800 mg PO TID; Klonopin PRN    Has tried in the past:    Opioids: yes  NSAIDS: no; avoids given anticoagulation status  Tylenol: yes  Muscle relaxants: no  TCAs: no  SNRIs: no  Anticonvulsants: yes  topical creams: no    Anticoagulation: yes; Eliquis    ROS:  Review of Systems   Constitutional:  Negative for chills and fever.   HENT:  Negative for sore throat.    Eyes:  Negative for visual disturbance.   Respiratory:  Negative for shortness of breath.    Cardiovascular:  Negative for chest pain.   Gastrointestinal:  Negative for nausea and vomiting.   Genitourinary:  Negative for difficulty urinating.   Musculoskeletal:  Positive for arthralgias,  back pain and gait problem.   Skin:  Negative for rash.   Allergic/Immunologic: Negative for immunocompromised state.   Neurological:  Negative for syncope and numbness.   Hematological:  Does not bruise/bleed easily.   Psychiatric/Behavioral:  Negative for suicidal ideas.       Past Medical History:   Diagnosis Date    *Atrial flutter     Angina pectoris 2017    Anxiety     Arthritis     Asthma     Atrial fibrillation     Back pain     Cataract     OD    CHF (congestive heart failure)     Chronically on benzodiazepine therapy 2019    COPD (chronic obstructive pulmonary disease)     Depression     Diabetes mellitus     Emphysema of lung     Heart failure     Hepatomegaly 2/3/2016    Hernia     History of MI (myocardial infarction) 2016    Hypercapnic respiratory failure, chronic 2016    Hyperlipidemia     Hypertension     Iron deficiency anemia 2/3/2016    Myocardial infarction     Obesity     Peripheral vascular disease     Pneumonia     Polyneuropathy     Retinal detachment     OS    Septic shock 2017    Skin ulcer 3/18/2017    Tobacco dependence     Type II 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, not stated as uncontrolled(250.60)      Past Surgical History:   Procedure Laterality Date    BREAST BIOPSY Left 10 yrs ago    benign    CATARACT EXTRACTION Left     OS      SECTION      x2    EYE SURGERY      HYSTERECTOMY      partial    OOPHORECTOMY      one ovary conserved    RETINAL DETACHMENT SURGERY      buckle --OS    TONSILLECTOMY       Family History   Problem Relation Age of Onset    Arthritis Father     Heart disease Father     Early death Sister 30        heart disease    Heart disease Sister 32        MI    Cancer Brother         Lung cancer    No Known Problems Daughter     Blindness Son         due to accident//    Arthritis Sister     Heart disease Sister     Crohn's disease Sister     Alcohol abuse Mother     Breast cancer Neg Hx      "Glaucoma Neg Hx     Macular degeneration Neg Hx     Retinal detachment Neg Hx     Amblyopia Neg Hx     Diabetes Neg Hx     Cataracts Neg Hx     Hypertension Neg Hx     Strabismus Neg Hx     Stroke Neg Hx     Thyroid disease Neg Hx      Social History     Socioeconomic History    Marital status:    Tobacco Use    Smoking status: Former     Packs/day: 0.30     Years: 50.00     Pack years: 15.00     Types: Cigarettes     Start date: 1968     Quit date: 2022     Years since quittin.2    Smokeless tobacco: Never   Substance and Sexual Activity    Alcohol use: No     Alcohol/week: 0.0 standard drinks    Drug use: No    Sexual activity: Not Currently     Social Determinants of Health     Financial Resource Strain: Medium Risk    Difficulty of Paying Living Expenses: Somewhat hard   Food Insecurity: Food Insecurity Present    Worried About Running Out of Food in the Last Year: Sometimes true         Allergies: See med card    Vitals:    22 0939   BP: 111/60   Pulse: (!) 58   Weight: 103.4 kg (228 lb)   Height: 5' 8" (1.727 m)   PainSc:   5   PainLoc: Back         Physical exam:  Physical Exam  Vitals and nursing note reviewed.   Constitutional:       Appearance: Normal appearance. She is not toxic-appearing or diaphoretic.   HENT:      Head: Normocephalic and atraumatic.   Eyes:      General:         Right eye: No discharge.         Left eye: No discharge.      Extraocular Movements: Extraocular movements intact.      Conjunctiva/sclera: Conjunctivae normal.   Cardiovascular:      Rate and Rhythm: Normal rate.   Pulmonary:      Effort: Pulmonary effort is normal. No respiratory distress.      Breath sounds: Normal breath sounds.   Abdominal:      Palpations: Abdomen is soft.   Skin:     General: Skin is warm and dry.      Findings: No rash.   Neurological:      Mental Status: She is alert and oriented to person, place, and time.   Psychiatric:         Mood and Affect: Mood and affect normal.       "   Cognition and Memory: Memory normal.         Judgment: Judgment normal.        UPPER EXTREMITIES: Normal alignment, normal range of motion, no atrophy, no skin changes,  hair growth and nail growth normal and equal bilaterally. No swelling, no tenderness.    LOWER EXTREMITIES:  Normal alignment, normal range of motion, no atrophy, no skin changes,  hair growth and nail growth normal and equal bilaterally. No swelling, no tenderness.    LUMBAR SPINE  Lumbar spine: ROM is full with flexion but significantly limited with extension and oblique extension with increased pain with extension.    ((--)) Supine straight leg raise    ((+)) Facet loading   Internal and external rotation of the hip causes no increased pain on either side.  Myofascial exam: Tenderness to palpation across lumbar paraspinous muscles.    ((+)) TTP at the SI joint on the left  ((+)) MARY's test  One leg stand - patient unable to perform  ((--)) Distraction test  ((+)) Thigh thrust    CRANIAL NERVES:  II:  PERRL bilaterally,   III,IV,VI: EOMI.    V:  Facial sensation equal bilaterally  VII:  Facial motor function normal.  VIII:  Hearing equal to finger rub bilaterally  IX/X: Gag normal, palate symmetric  XI:  Shoulder shrug equal, head turn equal  XII:  Tongue midline without fasciculations      MOTOR: Tone and bulk: normal     MUSCLE STRENGTH:  Hip Flexion: Right 5/5, Left 5/5  Hip Extension: Right 5/5, Left 5/5  Leg Flexion: Right 5/5, Left 5/5  Leg Extension: Right 5/5, Left 5/5  Plantar Flexion: Right 5/5, Left 5/5  Dorsiflexion: Right 5/5, Left 5/5    Delt Bi Tri     Right: 5/5 5/5 5/5 5/5   Left: 5/5 5/5 5/5 5/5     SENSATION: Light touch and pinprick intact bilaterally  REFLEXES: normal, symmetric, nonbrisk.  Toes down, no clonus. Negative fuller's sign bilaterally.  GAIT: antalgic gait; uses a rollator for assistance with ambulation      Imaging: X-ray lumbar spine (7/28/2015):  Neutral, flexion and extension views of lumbar spine  demonstrate a grade 2 anterolisthesis of L5 upon S1.  I do not see evidence of abnormal motion upon flexion and extension.  Multilevel degenerative disk disease is again evident.  Lower lumbar   degenerative facet changes are present.    Assessment: Nelly Tian is a 71 y.o. female with PMH significant for CHF (EF 25%), atrial fibrillation on Eliquis, DM II, HTN, and COPD presents as a referral for the evaluation of back pain. No recent imaging of the lumbar spine to review. Prior plain film from 2015 was significant for grade 2 anterolisthesis, DDD, and facet arthropathy. Treatment plan outlined below.     Plan:  - Ordered X-ray lumbar spine to evaluate for progression of previous pathology  - I have stressed the importance of physical activity and a home exercise plan to help with chronic pain and improve health.  - Continue current pain regimen as prescribed by her PCP  - RTC s/p imaging to discuss results and interventions as appropriate.    Thank you for referring this interesting patient, and I look forward to continuing to collaborate in her care.    Juancarlos Teague MD  Pain Management

## 2022-09-15 ENCOUNTER — HOSPITAL ENCOUNTER (OUTPATIENT)
Dept: RADIOLOGY | Facility: HOSPITAL | Age: 72
Discharge: HOME | End: 2022-09-15
Attending: ANESTHESIOLOGY
Payer: MEDICARE

## 2022-09-15 DIAGNOSIS — M43.17 SPONDYLOLISTHESIS OF LUMBOSACRAL REGION: ICD-10-CM

## 2022-09-15 DIAGNOSIS — M51.36 LUMBAR DEGENERATIVE DISC DISEASE: ICD-10-CM

## 2022-09-15 DIAGNOSIS — S32.050A CLOSED COMPRESSION FRACTURE OF L5 VERTEBRA, INITIAL ENCOUNTER: Primary | ICD-10-CM

## 2022-09-15 DIAGNOSIS — M47.896 OTHER SPONDYLOSIS, LUMBAR REGION: ICD-10-CM

## 2022-09-15 PROCEDURE — 72114 X-RAY EXAM L-S SPINE BENDING: CPT | Mod: 26,,, | Performed by: RADIOLOGY

## 2022-09-15 PROCEDURE — 72114 XR LUMBAR SPINE 5 VIEW WITH FLEX AND EXT: ICD-10-PCS | Mod: 26,,, | Performed by: RADIOLOGY

## 2022-09-15 PROCEDURE — 72114 X-RAY EXAM L-S SPINE BENDING: CPT | Mod: TC,FY

## 2022-09-21 ENCOUNTER — PATIENT OUTREACH (OUTPATIENT)
Dept: ADMINISTRATIVE | Facility: HOSPITAL | Age: 72
End: 2022-09-21
Payer: MEDICARE

## 2022-09-21 NOTE — PROGRESS NOTES
Gap report chart review completed for overdue diabetic eye exam  Care everywhere and Media reports reviewed.    PT WILL SCHEDULE EXTERNALLY

## 2022-09-26 ENCOUNTER — PATIENT MESSAGE (OUTPATIENT)
Dept: FAMILY MEDICINE | Facility: CLINIC | Age: 72
End: 2022-09-26
Payer: MEDICARE

## 2022-09-26 NOTE — NURSING
Pt give herself lovenox shot and stated that she felt comfortable. Pt tolerated well   Consent (Nose)/Introductory Paragraph: The rationale for Mohs was explained to the patient and consent was obtained. The risks, benefits and alternatives to therapy were discussed in detail. Specifically, the risks of nasal deformity, changes in the flow of air through the nose, infection, scarring, bleeding, prolonged wound healing, incomplete removal, allergy to anesthesia, nerve injury and recurrence were addressed. Prior to the procedure, the treatment site was clearly identified and confirmed by the patient. All components of Universal Protocol/PAUSE Rule completed.

## 2022-10-04 ENCOUNTER — OFFICE VISIT (OUTPATIENT)
Dept: PAIN MEDICINE | Facility: CLINIC | Age: 72
End: 2022-10-04
Payer: MEDICARE

## 2022-10-04 ENCOUNTER — HOSPITAL ENCOUNTER (OUTPATIENT)
Dept: RADIOLOGY | Facility: HOSPITAL | Age: 72
Discharge: HOME | End: 2022-10-04
Attending: ANESTHESIOLOGY
Payer: MEDICARE

## 2022-10-04 VITALS
HEART RATE: 43 BPM | HEIGHT: 68 IN | SYSTOLIC BLOOD PRESSURE: 132 MMHG | WEIGHT: 228 LBS | BODY MASS INDEX: 34.56 KG/M2 | DIASTOLIC BLOOD PRESSURE: 71 MMHG

## 2022-10-04 DIAGNOSIS — S32.050A CLOSED COMPRESSION FRACTURE OF L5 VERTEBRA, INITIAL ENCOUNTER: ICD-10-CM

## 2022-10-04 DIAGNOSIS — M51.36 LUMBAR DEGENERATIVE DISC DISEASE: ICD-10-CM

## 2022-10-04 DIAGNOSIS — M47.896 OTHER SPONDYLOSIS, LUMBAR REGION: Primary | ICD-10-CM

## 2022-10-04 PROCEDURE — 3008F PR BODY MASS INDEX (BMI) DOCUMENTED: ICD-10-PCS | Mod: CPTII,S$GLB,, | Performed by: ANESTHESIOLOGY

## 2022-10-04 PROCEDURE — 3066F NEPHROPATHY DOC TX: CPT | Mod: CPTII,S$GLB,, | Performed by: ANESTHESIOLOGY

## 2022-10-04 PROCEDURE — 3075F PR MOST RECENT SYSTOLIC BLOOD PRESS GE 130-139MM HG: ICD-10-PCS | Mod: CPTII,S$GLB,, | Performed by: ANESTHESIOLOGY

## 2022-10-04 PROCEDURE — 72148 MRI LUMBAR SPINE W/O DYE: CPT | Mod: TC

## 2022-10-04 PROCEDURE — 4010F ACE/ARB THERAPY RXD/TAKEN: CPT | Mod: CPTII,S$GLB,, | Performed by: ANESTHESIOLOGY

## 2022-10-04 PROCEDURE — 3044F PR MOST RECENT HEMOGLOBIN A1C LEVEL <7.0%: ICD-10-PCS | Mod: CPTII,S$GLB,, | Performed by: ANESTHESIOLOGY

## 2022-10-04 PROCEDURE — 3078F DIAST BP <80 MM HG: CPT | Mod: CPTII,S$GLB,, | Performed by: ANESTHESIOLOGY

## 2022-10-04 PROCEDURE — 72148 MRI LUMBAR SPINE WITHOUT CONTRAST: ICD-10-PCS | Mod: 26,,, | Performed by: RADIOLOGY

## 2022-10-04 PROCEDURE — 3044F HG A1C LEVEL LT 7.0%: CPT | Mod: CPTII,S$GLB,, | Performed by: ANESTHESIOLOGY

## 2022-10-04 PROCEDURE — 72148 MRI LUMBAR SPINE W/O DYE: CPT | Mod: 26,,, | Performed by: RADIOLOGY

## 2022-10-04 PROCEDURE — 4010F PR ACE/ARB THEARPY RXD/TAKEN: ICD-10-PCS | Mod: CPTII,S$GLB,, | Performed by: ANESTHESIOLOGY

## 2022-10-04 PROCEDURE — 99999 PR PBB SHADOW E&M-EST. PATIENT-LVL II: CPT | Mod: PBBFAC,,, | Performed by: ANESTHESIOLOGY

## 2022-10-04 PROCEDURE — 1157F PR ADVANCE CARE PLAN OR EQUIV PRESENT IN MEDICAL RECORD: ICD-10-PCS | Mod: CPTII,S$GLB,, | Performed by: ANESTHESIOLOGY

## 2022-10-04 PROCEDURE — 99999 PR PBB SHADOW E&M-EST. PATIENT-LVL II: ICD-10-PCS | Mod: PBBFAC,,, | Performed by: ANESTHESIOLOGY

## 2022-10-04 PROCEDURE — 99214 OFFICE O/P EST MOD 30 MIN: CPT | Mod: S$GLB,,, | Performed by: ANESTHESIOLOGY

## 2022-10-04 PROCEDURE — 1125F AMNT PAIN NOTED PAIN PRSNT: CPT | Mod: CPTII,S$GLB,, | Performed by: ANESTHESIOLOGY

## 2022-10-04 PROCEDURE — 99214 PR OFFICE/OUTPT VISIT, EST, LEVL IV, 30-39 MIN: ICD-10-PCS | Mod: S$GLB,,, | Performed by: ANESTHESIOLOGY

## 2022-10-04 PROCEDURE — 3008F BODY MASS INDEX DOCD: CPT | Mod: CPTII,S$GLB,, | Performed by: ANESTHESIOLOGY

## 2022-10-04 PROCEDURE — 3288F FALL RISK ASSESSMENT DOCD: CPT | Mod: CPTII,S$GLB,, | Performed by: ANESTHESIOLOGY

## 2022-10-04 PROCEDURE — 1159F MED LIST DOCD IN RCRD: CPT | Mod: CPTII,S$GLB,, | Performed by: ANESTHESIOLOGY

## 2022-10-04 PROCEDURE — 1101F PR PT FALLS ASSESS DOC 0-1 FALLS W/OUT INJ PAST YR: ICD-10-PCS | Mod: CPTII,S$GLB,, | Performed by: ANESTHESIOLOGY

## 2022-10-04 PROCEDURE — 3288F PR FALLS RISK ASSESSMENT DOCUMENTED: ICD-10-PCS | Mod: CPTII,S$GLB,, | Performed by: ANESTHESIOLOGY

## 2022-10-04 PROCEDURE — 1125F PR PAIN SEVERITY QUANTIFIED, PAIN PRESENT: ICD-10-PCS | Mod: CPTII,S$GLB,, | Performed by: ANESTHESIOLOGY

## 2022-10-04 PROCEDURE — 3060F PR POS MICROALBUMINURIA RESULT DOCUMENTED/REVIEW: ICD-10-PCS | Mod: CPTII,S$GLB,, | Performed by: ANESTHESIOLOGY

## 2022-10-04 PROCEDURE — 3060F POS MICROALBUMINURIA REV: CPT | Mod: CPTII,S$GLB,, | Performed by: ANESTHESIOLOGY

## 2022-10-04 PROCEDURE — 1101F PT FALLS ASSESS-DOCD LE1/YR: CPT | Mod: CPTII,S$GLB,, | Performed by: ANESTHESIOLOGY

## 2022-10-04 PROCEDURE — 1159F PR MEDICATION LIST DOCUMENTED IN MEDICAL RECORD: ICD-10-PCS | Mod: CPTII,S$GLB,, | Performed by: ANESTHESIOLOGY

## 2022-10-04 PROCEDURE — 1157F ADVNC CARE PLAN IN RCRD: CPT | Mod: CPTII,S$GLB,, | Performed by: ANESTHESIOLOGY

## 2022-10-04 PROCEDURE — 3078F PR MOST RECENT DIASTOLIC BLOOD PRESSURE < 80 MM HG: ICD-10-PCS | Mod: CPTII,S$GLB,, | Performed by: ANESTHESIOLOGY

## 2022-10-04 PROCEDURE — 3075F SYST BP GE 130 - 139MM HG: CPT | Mod: CPTII,S$GLB,, | Performed by: ANESTHESIOLOGY

## 2022-10-04 PROCEDURE — 3066F PR DOCUMENTATION OF TREATMENT FOR NEPHROPATHY: ICD-10-PCS | Mod: CPTII,S$GLB,, | Performed by: ANESTHESIOLOGY

## 2022-10-04 RX ORDER — DULOXETIN HYDROCHLORIDE 30 MG/1
CAPSULE, DELAYED RELEASE ORAL
COMMUNITY
Start: 2022-08-25 | End: 2022-11-01

## 2022-10-04 RX ORDER — LISINOPRIL 20 MG/1
20 TABLET ORAL 2 TIMES DAILY
COMMUNITY
Start: 2022-09-20 | End: 2023-02-08 | Stop reason: ALTCHOICE

## 2022-10-04 RX ORDER — OMEPRAZOLE 20 MG/1
20 CAPSULE, DELAYED RELEASE ORAL DAILY
COMMUNITY
Start: 2022-08-05 | End: 2023-02-14 | Stop reason: SDUPTHER

## 2022-10-04 NOTE — PROGRESS NOTES
FOLLOW UP NOTE:     CHIEF COMPLAINT: back pain    INITIAL HISTORY OF PRESENT ILLNESS: Nelly Tian is a 71 y.o. female with PMH significant for CHF (EF 25%), atrial fibrillation on Eliquis, DM II, HTN, and COPD presents as a referral for the evaluation of back pain. The patient reports that her pain began approximately 7 years ago after no inciting incident or trauma. The patient denies of improvement since onset. The patient localizes her pain to the left side of her lower back. The patient reports denies of radiation of her back pain. The patient describes her pain as a sharp type of pain. The patient denies of numbness. The patient reports that her pain is a 6/10. Patient denies of any urinary/fecal incontinence, saddle anesthesia, or weakness.      Aggravating factors: walking, prolonged standing, activity in general     Mitigating factors: sitting, pain medications    INTERVAL HISTORY OF PRESENT ILLNESS: Nelly Tian is a 71 y.o. female with PMH significant for CHF (EF 25%), atrial fibrillation on Eliquis, DM II, HTN, and COPD presents as an established patient for the continued management of back pain. In the interim, the patient denies of any changes in the character of her pain since her initial consultation.   The patient localizes her pain to the left side of her lower back without radiation of her back pain. The patient denies of associated numbness. The patient reports that her pain is worsened with walking and prolonged standing. The patient reports that her pain is improved with sitting and her pain medications. The patient denies of any significant changes in her health since her last appointment. The patient also denies of any changes in the character of her pain since her last appointment. The patient reports that her pain is a 6/10. Patient denies of any urinary/fecal incontinence, saddle anesthesia, or weakness.      The patient presents today to discuss her most recent MRI  "results and the next best steps in her treatment plan.     INTERVENTIONAL PAIN HISTORY:    CURRENT PAIN MEDICATIONS:   Currently taking: Norco 7.5-325 mg PO BID, gabapentin 800 mg PO TID; Klonopin PRN     Has tried in the past:    Opioids: yes  NSAIDS: no; avoids given anticoagulation status  Tylenol: yes  Muscle relaxants: no  TCAs: no  SNRIs: no  Anticonvulsants: yes  topical creams: no      ROS:  Review of Systems   Constitutional:  Negative for chills and fever.   HENT:  Negative for sore throat.    Eyes:  Negative for visual disturbance.   Respiratory:  Negative for shortness of breath.    Cardiovascular:  Negative for chest pain.   Gastrointestinal:  Negative for nausea and vomiting.   Genitourinary:  Negative for difficulty urinating.   Musculoskeletal:  Positive for arthralgias and back pain.   Skin:  Negative for rash.   Allergic/Immunologic: Negative for immunocompromised state.   Neurological:  Negative for syncope and numbness.   Hematological:  Does not bruise/bleed easily.   Psychiatric/Behavioral:  Negative for suicidal ideas.       MEDICAL, SURGICAL, FAMILY, SOCIAL HX: reviewed    MEDICATIONS/ALLERGIES: reviewed    PHYSICAL EXAM:    VITALS: Vitals reviewed.   Vitals:    10/04/22 0911   BP: 132/71   Pulse: (!) 43   Weight: 103.4 kg (228 lb)   Height: 5' 8" (1.727 m)   PainSc:   6       Physical Exam  Vitals and nursing note reviewed.   Constitutional:       Appearance: Normal appearance. She is not toxic-appearing or diaphoretic.   HENT:      Head: Normocephalic and atraumatic.   Eyes:      General:         Right eye: No discharge.         Left eye: No discharge.      Extraocular Movements: Extraocular movements intact.      Conjunctiva/sclera: Conjunctivae normal.   Cardiovascular:      Rate and Rhythm: Normal rate.   Pulmonary:      Effort: Pulmonary effort is normal. No respiratory distress.      Breath sounds: Normal breath sounds.   Abdominal:      Palpations: Abdomen is soft.   Skin:     " General: Skin is warm and dry.      Findings: No rash.   Neurological:      Mental Status: She is alert and oriented to person, place, and time.   Psychiatric:         Mood and Affect: Mood and affect normal.         Cognition and Memory: Memory normal.         Judgment: Judgment normal.        UPPER EXTREMITIES: Normal alignment, normal range of motion, no atrophy, no skin changes,  hair growth and nail growth normal and equal bilaterally. No swelling, no tenderness.    LOWER EXTREMITIES:  Normal alignment, normal range of motion, no atrophy, no skin changes,  hair growth and nail growth normal and equal bilaterally. No swelling, no tenderness.     LUMBAR SPINE  Lumbar spine: ROM is full with flexion but significantly limited with extension and oblique extension with increased pain with extension.    ((--)) Supine straight leg raise    ((+)) Facet loading   Internal and external rotation of the hip causes no increased pain on either side.  Myofascial exam: Tenderness to palpation across lumbar paraspinous muscles.     ((+)) TTP at the SI joint on the left  ((+)) MARY's test  ((--)) Distraction test  ((+)) Thigh thrust     MOTOR: Tone and bulk: normal      MUSCLE STRENGTH:  Hip Flexion: Right 5/5, Left 5/5  Hip Extension: Right 5/5, Left 5/5  Leg Flexion: Right 5/5, Left 5/5  Leg Extension: Right 5/5, Left 5/5  Plantar Flexion: Right 5/5, Left 5/5  Dorsiflexion: Right 5/5, Left 5/5     Delt      Bi         Tri                         Right:   5/5       5/5       5/5       5/5         Left:     5/5       5/5       5/5       5/5            SENSATION: Light touch and pinprick intact bilaterally  REFLEXES: normal, symmetric, nonbrisk.  Toes down, no clonus. Negative fuller's sign bilaterally.  GAIT: antalgic gait; uses a rollator for assistance with ambulation    IMAGING: MRI Lumbar Spine without contrast (10/4/2022):  There is mild lumbar dextrocurvature.  Marrow signal is normal.  Vertebral body heights are  within normal limits.  There is grade 1 anterolisthesis of L5 on S1 accompanied by bilateral L5 pars defects.  There is trace retrolisthesis of L4 on L5.  Moderate loss of height of the L5-S1 disc is present.  Otherwise disc heights are well maintained.  There is advanced multilevel facet arthropathy.     T12-L1 demonstrates no disc herniation, spinal canal narrowing, or neuroforaminal narrowing.     L1-2 demonstrates mild left neuroforaminal narrowing predominately due to facet arthropathy.     L2-3 demonstrates moderate left neuroforaminal narrowing predominately due to facet arthropathy.     L3-4 exhibits no disc herniation, spinal canal narrowing, or neuroforaminal narrowing.     L4-5 exhibits trace retrolisthesis accompanied by mild broad-based posterior disc bulging and a small annular fissure of the posterior disc.  There is also advanced bilateral facet arthropathy.  These findings contribute to mild left and severe right neuroforaminal narrowing.     L5-S1 exhibits grade 1 spondylolisthesis with uncovering of the posterior disc.  Severe bilateral neuroforaminal narrowing is present.  There is no spinal canal narrowing.     Impression:     Grade 1 L5-S1 spondylolisthesis, resulting in severe bilateral neuroforaminal narrowing.     Additional multilevel degenerative changes as above, most pronounced at L4-5 where severe right neuroforaminal narrowing is present.    ASSESSMENT: Nelly Tian is a 71 y.o. female with PMH significant for CHF (EF 25%), atrial fibrillation on Eliquis, DM II, HTN, and COPD presents as an established patient for the continued management of back pain. In the interim, the patient denies of any changes in the character of her pain since her initial consultation.   The patient localizes her pain to the left side of her lower back without radiation of her back pain. The patient presents today to discuss her most recent MRI results and the next best steps in her treatment plan.  MRI results reviewed with the patient personally. MRI significant for severe, multi-level facet arthropathy and DDD. I suspect the aforementioned pathologies are contributing to her current pain. Treatment plan outlined below.     PLAN:  Discussed medial branch blocks as a potential treatment modality in the future; pamphlet provided; patient to consider  I have stressed the importance of physical activity and a home exercise plan to help with chronic pain and improve health.  Continue current pain regimen as prescribed by her PCP  RTC PRN    Juancarlos Teague MD  Pain Management

## 2022-11-01 RX ORDER — DULOXETIN HYDROCHLORIDE 30 MG/1
CAPSULE, DELAYED RELEASE ORAL
Qty: 90 CAPSULE | Refills: 3 | Status: SHIPPED | OUTPATIENT
Start: 2022-11-01 | End: 2022-11-01

## 2022-11-01 NOTE — TELEPHONE ENCOUNTER
Good morning,   Duloxetine denied.  She has too many med interactions +has a risk for liver inflammation.

## 2022-11-01 NOTE — TELEPHONE ENCOUNTER
No new care gaps identified.  [facility] Embedded Care Gaps. Reference number: [id number]. 11/01/2022   3:15:22 AM CDT

## 2022-11-03 ENCOUNTER — PATIENT MESSAGE (OUTPATIENT)
Dept: ADMINISTRATIVE | Facility: HOSPITAL | Age: 72
End: 2022-11-03
Payer: MEDICARE

## 2022-11-04 ENCOUNTER — PATIENT OUTREACH (OUTPATIENT)
Dept: ADMINISTRATIVE | Facility: HOSPITAL | Age: 72
End: 2022-11-04
Payer: MEDICARE

## 2022-11-04 ENCOUNTER — PATIENT MESSAGE (OUTPATIENT)
Dept: ADMINISTRATIVE | Facility: HOSPITAL | Age: 72
End: 2022-11-04
Payer: MEDICARE

## 2022-11-04 NOTE — PROGRESS NOTES
Diabetic Eye Exam gap report review.    Called patient regarding scheduling/ overdue HM:    Health Maintenance Due   Topic Date Due    COVID-19 Vaccine (1) Never done    Shingles Vaccine (1 of 2) Never done    Naloxone Prescription  12/19/2018    Eye Exam  08/10/2021    Colorectal Cancer Screening  06/22/2022    Influenza Vaccine (1) 09/01/2022      No answer. Left message along with contact information to please call back. Patient portal message sent regarding same.

## 2022-11-07 ENCOUNTER — PES CALL (OUTPATIENT)
Dept: ADMINISTRATIVE | Facility: CLINIC | Age: 72
End: 2022-11-07
Payer: MEDICARE

## 2022-11-13 NOTE — TELEPHONE ENCOUNTER
Yes, hold Eliquis 3 days before colonoscopy.   Sophy Jay (DO)  [facility]  [address] [place] [address]  Phone: [phone]  Fax: [phone]  Scheduled Appointment: 11/15/2022

## 2022-11-14 ENCOUNTER — PES CALL (OUTPATIENT)
Dept: ADMINISTRATIVE | Facility: CLINIC | Age: 72
End: 2022-11-14
Payer: MEDICARE

## 2022-11-15 PROBLEM — F11.20 UNCOMPLICATED OPIOID DEPENDENCE: Status: ACTIVE | Noted: 2019-05-04

## 2022-11-15 PROBLEM — I50.33 ACUTE ON CHRONIC DIASTOLIC CONGESTIVE HEART FAILURE: Status: RESOLVED | Noted: 2017-02-14 | Resolved: 2022-11-15

## 2022-11-15 PROBLEM — E11.9 TYPE 2 DIABETES MELLITUS, WITHOUT LONG-TERM CURRENT USE OF INSULIN: Status: RESOLVED | Noted: 2018-09-10 | Resolved: 2022-11-15

## 2022-11-15 RX ORDER — SEMAGLUTIDE 1.34 MG/ML
INJECTION, SOLUTION SUBCUTANEOUS
COMMUNITY
Start: 2022-10-24 | End: 2022-12-05 | Stop reason: ALTCHOICE

## 2022-11-15 RX ORDER — NORGESTIMATE AND ETHINYL ESTRADIOL 0.25-0.035
1 KIT ORAL DAILY
COMMUNITY
Start: 2022-10-19 | End: 2022-12-21 | Stop reason: ALTCHOICE

## 2022-11-15 RX ORDER — BUSPIRONE HYDROCHLORIDE 5 MG/1
5 TABLET ORAL 2 TIMES DAILY
COMMUNITY
Start: 2022-10-22 | End: 2023-01-31

## 2022-11-15 NOTE — PROGRESS NOTES
The patient location is: [place]  The chief complaint leading to consultation is: Medicare Wellness Visit       Visit type: audiovisual     Face to Face time with patient: 20  60 minutes of total time spent on the encounter, which includes face to face time and non-face to face time preparing to see the patient (eg, review of tests), Obtaining and/or reviewing separately obtained history, Documenting clinical information in the electronic or other health record, Independently interpreting results (not separately reported) and communicating results to the patient/family/caregiver, or Care coordination (not separately reported).            Each patient to whom he or she provides medical services by telemedicine is:  (1) informed of the relationship between the physician and patient and the respective role of any other health care provider with respect to management of the patient; and (2) notified that he or she may decline to receive medical services by telemedicine and may withdraw from such care at any time.      Nelly Tian presented for a  Medicare AWV and comprehensive Health Risk Assessment today. The following components were reviewed and updated:    Medical history  Family History  Social history  Allergies and Current Medications  Health Risk Assessment  Health Maintenance  Care Team         ** See Completed Assessments for Annual Wellness Visit within the encounter summary.**         The following assessments were completed:  Living Situation  CAGE  Depression Screening  Timed Get Up and Go  Whisper Test  Cognitive Function Screening  Nutrition Screening  ADL Screening  PAQ Screening      Review for Opioid Screening: Pt does not have Rx for Opioids      Review for Substance Use Disorders: Patient does not use substance        Current Outpatient Medications:     albuterol (PROVENTIL/VENTOLIN HFA) 90 mcg/actuation inhaler, INHALE 2 PUFFS EVERY 6 HOURS AS NEEDED FOR WHEEZING (RESCUE), Disp: 18 g, Rfl:  3    apixaban (ELIQUIS) 5 mg Tab, Take 1 tablet (5 mg total) by mouth 2 (two) times daily., Disp: 180 tablet, Rfl: 0    ascorbic acid, vitamin C, (VITAMIN C) 500 MG tablet, Take 2 tablets (1,000 mg total) by mouth every evening., Disp: , Rfl:     busPIRone (BUSPAR) 5 MG Tab, , Disp: , Rfl:     empagliflozin (JARDIANCE) 10 mg tablet, Take 1 tablet (10 mg total) by mouth once daily., Disp: 90 tablet, Rfl: 3    fluticasone propionate (FLONASE) 50 mcg/actuation nasal spray, SHAKE LIQUID AND USE 1 SPRAY(50 MCG) IN EACH NOSTRIL EVERY DAY, Disp: 48 g, Rfl: 2    fluticasone-umeclidin-vilanter (TRELEGY ELLIPTA) 100-62.5-25 mcg DsDv, INHALE 1 PUFF INTO THE LUNGS ONE TIME DAILY, Disp: 180 each, Rfl: 3    furosemide (LASIX) 40 MG tablet, TAKE 1 TABLET(40 MG) BY MOUTH EVERY DAY, Disp: 90 tablet, Rfl: 3    gabapentin (NEURONTIN) 800 MG tablet, Take 1 tablet (800 mg total) by mouth 3 (three) times daily., Disp: 90 tablet, Rfl: 11    levalbuterol (XOPENEX) 0.63 mg/3 mL nebulizer solution, USE 1 VIAL VIA NEBULIZER FOUR TIMES DAILY, Disp: 750 mL, Rfl: 10    LIDOcaine (LIDODERM) 5 %, APPLY PATCH ONTO SKIN.REMOVE AND DISCARD PATCH WITHIN 12 HOURS OR AS DIRECTED BY MD, Disp: 30 patch, Rfl: 3    lisinopriL (PRINIVIL,ZESTRIL) 20 MG tablet, , Disp: , Rfl:     metFORMIN (GLUCOPHAGE) 500 MG tablet, Take 1 tablet (500 mg total) by mouth daily with breakfast., Disp: 90 tablet, Rfl: 0    GARY 0.25-35 mg-mcg per tablet, Take 1 tablet by mouth once daily., Disp: , Rfl:     multivitamin (THERAGRAN) tablet, Take 1 tablet by mouth once daily., Disp: , Rfl:     nystatin (NYSTOP) powder, APPLY TOPICALLY TWICE DAILY, Disp: 120 g, Rfl: 11    nystatin-triamcinolone (MYCOLOG II) cream, Apply topically 3 (three) times daily. to affected area, Disp: 30 g, Rfl: 3    omeprazole (PRILOSEC) 20 MG capsule, Take 20 mg by mouth once daily., Disp: , Rfl:     ondansetron (ZOFRAN-ODT) 8 MG TbDL, DISSOLVE 1 TABLET ON THE TONGUE EVERY 8 HOURS AS NEEDED, Disp: 40  "tablet, Rfl: 3    OZEMPIC 0.25 mg or 0.5 mg(2 mg/1.5 mL) pen injector, START 0.25 MG WEEKLY FOR 2 WEEKS THEN 0.5 MG WEERKLY THEREAFTER, Disp: , Rfl:     potassium chloride SA (K-DUR,KLOR-CON M) 10 MEQ tablet, Take 1 tablet (10 mEq total) by mouth once daily., Disp: , Rfl:     spironolactone (ALDACTONE) 50 MG tablet, Take 1 tablet (50 mg total) by mouth once daily., Disp: 30 tablet, Rfl: 11    traZODone (DESYREL) 50 MG tablet, Take 1.5 tablets (75 mg total) by mouth nightly as needed for Insomnia (insomnia)., Disp: 135 tablet, Rfl: 3    atorvastatin (LIPITOR) 10 MG tablet, TAKE 1 TABLET(10 MG) BY MOUTH EVERY OTHER DAY, Disp: 35 tablet, Rfl: 3         Vitals:    11/28/22 1456   Weight: 103.4 kg (228 lb)   Height: 5' 8" (1.727 m)   PainSc:   9   PainLoc: Back      Physical Exam  Nursing note reviewed.   Constitutional:       General: She is not in acute distress.     Appearance: Normal appearance. She is not ill-appearing.   HENT:      Head: Normocephalic and atraumatic.   Eyes:      General: No scleral icterus.        Right eye: No discharge.         Left eye: No discharge.      Extraocular Movements: Extraocular movements intact.      Conjunctiva/sclera: Conjunctivae normal.   Pulmonary:      Effort: Pulmonary effort is normal. No respiratory distress.   Musculoskeletal:      Cervical back: Normal range of motion.   Neurological:      Mental Status: She is alert and oriented to person, place, and time.   Psychiatric:         Mood and Affect: Mood normal.         Behavior: Behavior normal.         Cognition and Memory: Cognition and memory normal.           Diagnoses and health risks identified today and associated recommendations/orders:    1. Encounter for preventive health examination  - Chart reviewed. Problem list updated. Discussed current medical diagnosis, current medications, medical/surgical/family/social history; updated provider list; documented vital signs; identified any cognitive impairment; and updated " risk factor list. Addressed any outstanding health maintenance. Provided patient with personalized health advice. Continue to follow up with PCP and any specialists.       2. Major depression, recurrent, chronic  Chronic; stable on current treatment plan; follow up with PCP  - taking buspar    3. Chronic respiratory failure with hypoxia  Chronic; stable on current treatment plan; follow up with PCP  - uses oxygen at night     4. Chronic obstructive pulmonary disease, unspecified COPD type  Chronic; stable on current treatment plan; follow up with PCP  - uses oxygen at night   - uses trelegy ellipta inhaler     5. Abdominal aortic atherosclerosis  Chronic; stable on current treatment plan; follow up with PCP  - taking statin    6. Chronic atrial fibrillation  Chronic; stable on current treatment plan; follow up with PCP  - taknig eliquis     7. Type 2 diabetes mellitus with diabetic neuropathy, without long-term current use of insulin  Chronic; stable on current treatment plan; follow up with PCP  - taking metformin, jardiance, and ozempic     8. PVD (peripheral vascular disease)  Chronic; stable on current treatment plan; follow up with PCP  - taking statin     9. Chronic combined systolic and diastolic CHF (congestive heart failure)  Chronic; stable on current treatment plan; follow up with PCP  - recommend low sodium diet     10. Hypertension associated with diabetes  Chronic; stable on current treatment plan; follow up with PCP  - recommend low sodium diet     11. Hyperlipidemia associated with type 2 diabetes mellitus  Chronic; stable on current treatment plan; follow up with PCP  - taking statin     12. Hypercapnic respiratory failure, chronic  Chronic; stable on current treatment plan; follow up with PCP  - uses oxygen at night     13. Gastroesophageal reflux disease without esophagitis  Chronic; stable on current treatment plan; follow up with PCP    14. DDD (degenerative disc disease), lumbar  Chronic;  stable on current treatment plan; follow up with PCP    15. Personal history of tobacco use  Chronic; stable on current treatment plan; follow up with PCP  - no longer smoking     16. Abnormality of gait and mobility  Chronic; stable on current treatment plan; follow up with PCP  - has rolling walker , uses PRN    17. BMI 34.0-34.9,adult  - Recommendation for healthy diet and increasing exercise as tolerated with goal of 150min/week . Recommend weight loss        Provided Nelly with a 5-10 year written screening schedule and personal prevention plan. Recommendations were developed using the USPSTF age appropriate recommendations. Education, counseling, and referrals were provided as needed. After Visit Summary printed and given to patient which includes a list of additional screenings\tests needed.    Follow up for your next annual wellness visit.    Arianne Guillermo, DARIEN-C    Advance Care Planning       I offered to discuss advanced care planning, including how to pick a person who would make decisions for you if you were unable to make them for yourself, called a health care power of , and what kind of decisions you might make such as use of life sustaining treatments such as ventilators and tube feeding when faced with a life limiting illness recorded on a living will that they will need to know. (How you want to be cared for as you near the end of your natural life)     X  Patient has advanced directives on file, which we reviewed, and they do not wish to make changes.

## 2022-11-21 PROBLEM — A41.9 SEPSIS: Status: RESOLVED | Noted: 2018-09-23 | Resolved: 2022-11-21

## 2022-11-22 ENCOUNTER — PATIENT MESSAGE (OUTPATIENT)
Dept: ADMINISTRATIVE | Facility: CLINIC | Age: 72
End: 2022-11-22
Payer: MEDICARE

## 2022-11-22 DIAGNOSIS — Z51.81 THERAPEUTIC DRUG MONITORING: ICD-10-CM

## 2022-11-22 DIAGNOSIS — M51.36 LUMBAR DEGENERATIVE DISC DISEASE: ICD-10-CM

## 2022-11-25 ENCOUNTER — TELEPHONE (OUTPATIENT)
Dept: ADMINISTRATIVE | Facility: CLINIC | Age: 72
End: 2022-11-25
Payer: MEDICARE

## 2022-11-25 ENCOUNTER — PATIENT MESSAGE (OUTPATIENT)
Dept: ADMINISTRATIVE | Facility: CLINIC | Age: 72
End: 2022-11-25
Payer: MEDICARE

## 2022-11-25 NOTE — TELEPHONE ENCOUNTER
Called pt; no answer; left message informing patient I was calling to confirm her virtual EAWV on 11/28/22 at 3:00pm and to see if she needed any help with setting up for virtual appt or e-pre check and to login 15 minutes prior to scheduled appt; left my name & number for pt to return my call if she has any questions or concerns; sent message through portal

## 2022-11-28 ENCOUNTER — TELEPHONE (OUTPATIENT)
Dept: ADMINISTRATIVE | Facility: CLINIC | Age: 72
End: 2022-11-28
Payer: MEDICARE

## 2022-11-28 ENCOUNTER — OFFICE VISIT (OUTPATIENT)
Dept: INTERNAL MEDICINE | Facility: CLINIC | Age: 72
End: 2022-11-28
Payer: MEDICARE

## 2022-11-28 VITALS — WEIGHT: 228 LBS | BODY MASS INDEX: 34.56 KG/M2 | HEIGHT: 68 IN

## 2022-11-28 DIAGNOSIS — I48.20 CHRONIC ATRIAL FIBRILLATION: ICD-10-CM

## 2022-11-28 DIAGNOSIS — E11.40 TYPE 2 DIABETES MELLITUS WITH DIABETIC NEUROPATHY, WITHOUT LONG-TERM CURRENT USE OF INSULIN: ICD-10-CM

## 2022-11-28 DIAGNOSIS — M51.36 DDD (DEGENERATIVE DISC DISEASE), LUMBAR: Chronic | ICD-10-CM

## 2022-11-28 DIAGNOSIS — K21.9 GASTROESOPHAGEAL REFLUX DISEASE WITHOUT ESOPHAGITIS: ICD-10-CM

## 2022-11-28 DIAGNOSIS — E78.5 HYPERLIPIDEMIA ASSOCIATED WITH TYPE 2 DIABETES MELLITUS: ICD-10-CM

## 2022-11-28 DIAGNOSIS — E11.59 HYPERTENSION ASSOCIATED WITH DIABETES: ICD-10-CM

## 2022-11-28 DIAGNOSIS — I15.2 HYPERTENSION ASSOCIATED WITH DIABETES: ICD-10-CM

## 2022-11-28 DIAGNOSIS — Z00.00 ENCOUNTER FOR PREVENTIVE HEALTH EXAMINATION: Primary | ICD-10-CM

## 2022-11-28 DIAGNOSIS — I73.9 PVD (PERIPHERAL VASCULAR DISEASE): ICD-10-CM

## 2022-11-28 DIAGNOSIS — E11.69 HYPERLIPIDEMIA ASSOCIATED WITH TYPE 2 DIABETES MELLITUS: ICD-10-CM

## 2022-11-28 DIAGNOSIS — Z87.891 PERSONAL HISTORY OF TOBACCO USE: ICD-10-CM

## 2022-11-28 DIAGNOSIS — J44.9 CHRONIC OBSTRUCTIVE PULMONARY DISEASE, UNSPECIFIED COPD TYPE: ICD-10-CM

## 2022-11-28 DIAGNOSIS — J96.11 CHRONIC RESPIRATORY FAILURE WITH HYPOXIA: ICD-10-CM

## 2022-11-28 DIAGNOSIS — R26.9 ABNORMALITY OF GAIT AND MOBILITY: ICD-10-CM

## 2022-11-28 DIAGNOSIS — F33.9 MAJOR DEPRESSION, RECURRENT, CHRONIC: ICD-10-CM

## 2022-11-28 DIAGNOSIS — I70.0 ABDOMINAL AORTIC ATHEROSCLEROSIS: ICD-10-CM

## 2022-11-28 DIAGNOSIS — J96.12 HYPERCAPNIC RESPIRATORY FAILURE, CHRONIC: ICD-10-CM

## 2022-11-28 DIAGNOSIS — I50.42 CHRONIC COMBINED SYSTOLIC AND DIASTOLIC CHF (CONGESTIVE HEART FAILURE): ICD-10-CM

## 2022-11-28 PROCEDURE — 1101F PT FALLS ASSESS-DOCD LE1/YR: CPT | Mod: CPTII,95,, | Performed by: NURSE PRACTITIONER

## 2022-11-28 PROCEDURE — 1160F RVW MEDS BY RX/DR IN RCRD: CPT | Mod: CPTII,95,, | Performed by: NURSE PRACTITIONER

## 2022-11-28 PROCEDURE — 3066F PR DOCUMENTATION OF TREATMENT FOR NEPHROPATHY: ICD-10-PCS | Mod: CPTII,95,, | Performed by: NURSE PRACTITIONER

## 2022-11-28 PROCEDURE — 3008F BODY MASS INDEX DOCD: CPT | Mod: CPTII,95,, | Performed by: NURSE PRACTITIONER

## 2022-11-28 PROCEDURE — 3060F POS MICROALBUMINURIA REV: CPT | Mod: CPTII,95,, | Performed by: NURSE PRACTITIONER

## 2022-11-28 PROCEDURE — 1159F MED LIST DOCD IN RCRD: CPT | Mod: CPTII,95,, | Performed by: NURSE PRACTITIONER

## 2022-11-28 PROCEDURE — 3044F HG A1C LEVEL LT 7.0%: CPT | Mod: CPTII,95,, | Performed by: NURSE PRACTITIONER

## 2022-11-28 PROCEDURE — 4010F PR ACE/ARB THEARPY RXD/TAKEN: ICD-10-PCS | Mod: CPTII,95,, | Performed by: NURSE PRACTITIONER

## 2022-11-28 PROCEDURE — 3066F NEPHROPATHY DOC TX: CPT | Mod: CPTII,95,, | Performed by: NURSE PRACTITIONER

## 2022-11-28 PROCEDURE — 3044F PR MOST RECENT HEMOGLOBIN A1C LEVEL <7.0%: ICD-10-PCS | Mod: CPTII,95,, | Performed by: NURSE PRACTITIONER

## 2022-11-28 PROCEDURE — 1170F PR FUNCTIONAL STATUS ASSESSED: ICD-10-PCS | Mod: CPTII,95,, | Performed by: NURSE PRACTITIONER

## 2022-11-28 PROCEDURE — 3008F PR BODY MASS INDEX (BMI) DOCUMENTED: ICD-10-PCS | Mod: CPTII,95,, | Performed by: NURSE PRACTITIONER

## 2022-11-28 PROCEDURE — 1157F ADVNC CARE PLAN IN RCRD: CPT | Mod: CPTII,95,, | Performed by: NURSE PRACTITIONER

## 2022-11-28 PROCEDURE — 1170F FXNL STATUS ASSESSED: CPT | Mod: CPTII,95,, | Performed by: NURSE PRACTITIONER

## 2022-11-28 PROCEDURE — 4010F ACE/ARB THERAPY RXD/TAKEN: CPT | Mod: CPTII,95,, | Performed by: NURSE PRACTITIONER

## 2022-11-28 PROCEDURE — G0439 PR MEDICARE ANNUAL WELLNESS SUBSEQUENT VISIT: ICD-10-PCS | Mod: 95,,, | Performed by: NURSE PRACTITIONER

## 2022-11-28 PROCEDURE — 1159F PR MEDICATION LIST DOCUMENTED IN MEDICAL RECORD: ICD-10-PCS | Mod: CPTII,95,, | Performed by: NURSE PRACTITIONER

## 2022-11-28 PROCEDURE — 1125F PR PAIN SEVERITY QUANTIFIED, PAIN PRESENT: ICD-10-PCS | Mod: CPTII,95,, | Performed by: NURSE PRACTITIONER

## 2022-11-28 PROCEDURE — 1157F PR ADVANCE CARE PLAN OR EQUIV PRESENT IN MEDICAL RECORD: ICD-10-PCS | Mod: CPTII,95,, | Performed by: NURSE PRACTITIONER

## 2022-11-28 PROCEDURE — 1160F PR REVIEW ALL MEDS BY PRESCRIBER/CLIN PHARMACIST DOCUMENTED: ICD-10-PCS | Mod: CPTII,95,, | Performed by: NURSE PRACTITIONER

## 2022-11-28 PROCEDURE — 3060F PR POS MICROALBUMINURIA RESULT DOCUMENTED/REVIEW: ICD-10-PCS | Mod: CPTII,95,, | Performed by: NURSE PRACTITIONER

## 2022-11-28 PROCEDURE — G0439 PPPS, SUBSEQ VISIT: HCPCS | Mod: 95,,, | Performed by: NURSE PRACTITIONER

## 2022-11-28 PROCEDURE — 3288F PR FALLS RISK ASSESSMENT DOCUMENTED: ICD-10-PCS | Mod: CPTII,95,, | Performed by: NURSE PRACTITIONER

## 2022-11-28 PROCEDURE — 3288F FALL RISK ASSESSMENT DOCD: CPT | Mod: CPTII,95,, | Performed by: NURSE PRACTITIONER

## 2022-11-28 PROCEDURE — 1101F PR PT FALLS ASSESS DOC 0-1 FALLS W/OUT INJ PAST YR: ICD-10-PCS | Mod: CPTII,95,, | Performed by: NURSE PRACTITIONER

## 2022-11-28 PROCEDURE — 1125F AMNT PAIN NOTED PAIN PRSNT: CPT | Mod: CPTII,95,, | Performed by: NURSE PRACTITIONER

## 2022-11-28 NOTE — PATIENT INSTRUCTIONS
Counseling and Referral of Other Preventative  (Italic type indicates deductible and co-insurance are waived)    Patient Name: Nelly Tian  Today's Date: 11/28/2022    Health Maintenance       Date Due Completion Date    Colorectal Cancer Screening 12/21/2022 (Originally 1950) 6/21/2022    Eye Exam 12/21/2022 (Originally 8/10/2021) 8/10/2020    Override on 7/17/2013: Done    Influenza Vaccine (1) 06/30/2023 (Originally 9/1/2022) 10/3/2020    Shingles Vaccine (1 of 2) 11/15/2023 (Originally 11/15/2001) ---    COVID-19 Vaccine (1) 11/15/2023 (Originally 5/15/1951) ---    Hemoglobin A1c 01/22/2023 7/22/2022    Diabetes Urine Screening 03/25/2023 3/25/2022    Lipid Panel 03/25/2023 3/25/2022    Mammogram 07/22/2023 7/22/2022    Foot Exam 07/26/2023 7/26/2022    Override on 7/15/2015: Done (Dr Meng)    Override on 11/13/2014: Done    Override on 7/17/2013: Done    High Dose Statin 10/11/2023 10/11/2022    DEXA Scan 07/22/2025 7/22/2022    TETANUS VACCINE 05/08/2029 5/8/2019        No orders of the defined types were placed in this encounter.    The following information is provided to all patients.  This information is to help you find resources for any of the problems found today that may be affecting your health:                Living healthy guide: www.[facility].gov      Understanding Diabetes: www.diabetes.org      Eating healthy: www.cdc.gov/healthyweight      CDC home safety checklist: www.cdc.gov/steadi/patient.html      Agency on Aging: www.goea.[place].gov      Alcoholics anonymous (AA): www.aa.org      Physical Activity: www.mary ann.nih.gov/vt8hnct      Tobacco use: www.quitwithusla.org

## 2022-12-02 ENCOUNTER — LAB VISIT (OUTPATIENT)
Dept: LAB | Facility: HOSPITAL | Age: 72
End: 2022-12-02
Attending: FAMILY MEDICINE
Payer: MEDICARE

## 2022-12-02 DIAGNOSIS — I50.30 DIASTOLIC CHF WITH PRESERVED LEFT VENTRICULAR FUNCTION, NYHA CLASS 2: ICD-10-CM

## 2022-12-02 DIAGNOSIS — J42 CHRONIC BRONCHITIS, UNSPECIFIED CHRONIC BRONCHITIS TYPE: ICD-10-CM

## 2022-12-02 LAB — BNP SERPL-MCNC: 153 PG/ML (ref 0–99)

## 2022-12-02 PROCEDURE — 36415 COLL VENOUS BLD VENIPUNCTURE: CPT | Mod: PO | Performed by: FAMILY MEDICINE

## 2022-12-02 PROCEDURE — 83880 ASSAY OF NATRIURETIC PEPTIDE: CPT | Performed by: FAMILY MEDICINE

## 2022-12-02 RX ORDER — FLUTICASONE FUROATE, UMECLIDINIUM BROMIDE AND VILANTEROL TRIFENATATE 100; 62.5; 25 UG/1; UG/1; UG/1
POWDER RESPIRATORY (INHALATION)
Qty: 60 EACH | Refills: 3 | Status: SHIPPED | OUTPATIENT
Start: 2022-12-02 | End: 2023-02-14 | Stop reason: SDUPTHER

## 2022-12-02 NOTE — TELEPHONE ENCOUNTER
----- Message from Marlyn Collado sent at 12/2/2022  4:33 PM CST -----  Contact: Patient  Type:  Patient Returning Call    Who Called: patient     Who Left Message for Patient:     Does the patient know what this is regarding?: yes     Would the patient rather a call back or a response via [facility]? Call     Best Call Back Number:[phone] (home) [phone] (work)     Additional Information:

## 2022-12-02 NOTE — TELEPHONE ENCOUNTER
No new care gaps identified.  [facility] Embedded Care Gaps. Reference number: [id number]. 12/02/2022   2:17:32 AM CST

## 2022-12-03 RX ORDER — HYDROCODONE BITARTRATE AND ACETAMINOPHEN 7.5; 325 MG/1; MG/1
1 TABLET ORAL EVERY 12 HOURS PRN
Qty: 20 TABLET | Refills: 0 | Status: SHIPPED | OUTPATIENT
Start: 2022-12-03 | End: 2022-12-05 | Stop reason: SDUPTHER

## 2022-12-05 ENCOUNTER — OFFICE VISIT (OUTPATIENT)
Dept: FAMILY MEDICINE | Facility: CLINIC | Age: 72
End: 2022-12-05
Payer: MEDICARE

## 2022-12-05 ENCOUNTER — LAB VISIT (OUTPATIENT)
Dept: LAB | Facility: HOSPITAL | Age: 72
End: 2022-12-05
Attending: FAMILY MEDICINE
Payer: MEDICARE

## 2022-12-05 VITALS
BODY MASS INDEX: 36.62 KG/M2 | WEIGHT: 241.63 LBS | RESPIRATION RATE: 14 BRPM | HEART RATE: 56 BPM | HEIGHT: 68 IN | SYSTOLIC BLOOD PRESSURE: 124 MMHG | OXYGEN SATURATION: 96 % | DIASTOLIC BLOOD PRESSURE: 70 MMHG | TEMPERATURE: 99 F

## 2022-12-05 DIAGNOSIS — Z51.89 ENCOUNTER FOR PATIENT COMPLIANCE MONITORING IN DRUG TREATMENT PROGRAM: ICD-10-CM

## 2022-12-05 DIAGNOSIS — I50.22 CHF (CONGESTIVE HEART FAILURE), NYHA CLASS IV, CHRONIC, SYSTOLIC: Primary | ICD-10-CM

## 2022-12-05 DIAGNOSIS — E83.52 PARATHYROID RELATED HYPERCALCEMIA: ICD-10-CM

## 2022-12-05 DIAGNOSIS — E21.5 PARATHYROID RELATED HYPERCALCEMIA: ICD-10-CM

## 2022-12-05 DIAGNOSIS — M51.36 LUMBAR DEGENERATIVE DISC DISEASE: ICD-10-CM

## 2022-12-05 DIAGNOSIS — E11.51 TYPE 2 DIABETES MELLITUS WITH DIABETIC PERIPHERAL ANGIOPATHY WITHOUT GANGRENE, WITHOUT LONG-TERM CURRENT USE OF INSULIN: ICD-10-CM

## 2022-12-05 DIAGNOSIS — Z51.81 THERAPEUTIC DRUG MONITORING: ICD-10-CM

## 2022-12-05 LAB
ALBUMIN SERPL BCP-MCNC: 4.4 G/DL (ref 3.5–5.2)
ALP SERPL-CCNC: 91 U/L (ref 55–135)
ALT SERPL W/O P-5'-P-CCNC: 34 U/L (ref 10–44)
ANION GAP SERPL CALC-SCNC: 11 MMOL/L (ref 8–16)
AST SERPL-CCNC: 35 U/L (ref 10–40)
BILIRUB SERPL-MCNC: 0.3 MG/DL (ref 0.1–1)
BUN SERPL-MCNC: 17 MG/DL (ref 8–23)
CALCIUM SERPL-MCNC: 11.5 MG/DL (ref 8.7–10.5)
CHLORIDE SERPL-SCNC: 100 MMOL/L (ref 95–110)
CO2 SERPL-SCNC: 29 MMOL/L (ref 23–29)
CREAT SERPL-MCNC: 0.7 MG/DL (ref 0.5–1.4)
EST. GFR  (NO RACE VARIABLE): >60 ML/MIN/1.73 M^2
GLUCOSE SERPL-MCNC: 73 MG/DL (ref 70–110)
POTASSIUM SERPL-SCNC: 4.5 MMOL/L (ref 3.5–5.1)
PROT SERPL-MCNC: 8.2 G/DL (ref 6–8.4)
SODIUM SERPL-SCNC: 140 MMOL/L (ref 136–145)

## 2022-12-05 PROCEDURE — 3078F DIAST BP <80 MM HG: CPT | Mod: CPTII,S$GLB,, | Performed by: FAMILY MEDICINE

## 2022-12-05 PROCEDURE — 90694 VACC AIIV4 NO PRSRV 0.5ML IM: CPT | Mod: S$GLB,,, | Performed by: FAMILY MEDICINE

## 2022-12-05 PROCEDURE — 1126F PR PAIN SEVERITY QUANTIFIED, NO PAIN PRESENT: ICD-10-PCS | Mod: CPTII,S$GLB,, | Performed by: FAMILY MEDICINE

## 2022-12-05 PROCEDURE — 3044F HG A1C LEVEL LT 7.0%: CPT | Mod: CPTII,S$GLB,, | Performed by: FAMILY MEDICINE

## 2022-12-05 PROCEDURE — 3060F POS MICROALBUMINURIA REV: CPT | Mod: CPTII,S$GLB,, | Performed by: FAMILY MEDICINE

## 2022-12-05 PROCEDURE — 90694 FLU VACCINE - QUADRIVALENT - ADJUVANTED: ICD-10-PCS | Mod: S$GLB,,, | Performed by: FAMILY MEDICINE

## 2022-12-05 PROCEDURE — 1159F PR MEDICATION LIST DOCUMENTED IN MEDICAL RECORD: ICD-10-PCS | Mod: CPTII,S$GLB,, | Performed by: FAMILY MEDICINE

## 2022-12-05 PROCEDURE — 80053 COMPREHEN METABOLIC PANEL: CPT | Performed by: FAMILY MEDICINE

## 2022-12-05 PROCEDURE — 99214 PR OFFICE/OUTPT VISIT, EST, LEVL IV, 30-39 MIN: ICD-10-PCS | Mod: S$GLB,,, | Performed by: FAMILY MEDICINE

## 2022-12-05 PROCEDURE — 1157F ADVNC CARE PLAN IN RCRD: CPT | Mod: CPTII,S$GLB,, | Performed by: FAMILY MEDICINE

## 2022-12-05 PROCEDURE — 1126F AMNT PAIN NOTED NONE PRSNT: CPT | Mod: CPTII,S$GLB,, | Performed by: FAMILY MEDICINE

## 2022-12-05 PROCEDURE — 4010F PR ACE/ARB THEARPY RXD/TAKEN: ICD-10-PCS | Mod: CPTII,S$GLB,, | Performed by: FAMILY MEDICINE

## 2022-12-05 PROCEDURE — 3066F PR DOCUMENTATION OF TREATMENT FOR NEPHROPATHY: ICD-10-PCS | Mod: CPTII,S$GLB,, | Performed by: FAMILY MEDICINE

## 2022-12-05 PROCEDURE — 3078F PR MOST RECENT DIASTOLIC BLOOD PRESSURE < 80 MM HG: ICD-10-PCS | Mod: CPTII,S$GLB,, | Performed by: FAMILY MEDICINE

## 2022-12-05 PROCEDURE — 3008F PR BODY MASS INDEX (BMI) DOCUMENTED: ICD-10-PCS | Mod: CPTII,S$GLB,, | Performed by: FAMILY MEDICINE

## 2022-12-05 PROCEDURE — 1101F PT FALLS ASSESS-DOCD LE1/YR: CPT | Mod: CPTII,S$GLB,, | Performed by: FAMILY MEDICINE

## 2022-12-05 PROCEDURE — 1159F MED LIST DOCD IN RCRD: CPT | Mod: CPTII,S$GLB,, | Performed by: FAMILY MEDICINE

## 2022-12-05 PROCEDURE — 99999 PR PBB SHADOW E&M-EST. PATIENT-LVL V: ICD-10-PCS | Mod: PBBFAC,,, | Performed by: FAMILY MEDICINE

## 2022-12-05 PROCEDURE — G0008 FLU VACCINE - QUADRIVALENT - ADJUVANTED: ICD-10-PCS | Mod: S$GLB,,, | Performed by: FAMILY MEDICINE

## 2022-12-05 PROCEDURE — 1160F RVW MEDS BY RX/DR IN RCRD: CPT | Mod: CPTII,S$GLB,, | Performed by: FAMILY MEDICINE

## 2022-12-05 PROCEDURE — 3044F PR MOST RECENT HEMOGLOBIN A1C LEVEL <7.0%: ICD-10-PCS | Mod: CPTII,S$GLB,, | Performed by: FAMILY MEDICINE

## 2022-12-05 PROCEDURE — 83036 HEMOGLOBIN GLYCOSYLATED A1C: CPT | Performed by: FAMILY MEDICINE

## 2022-12-05 PROCEDURE — 99999 PR PBB SHADOW E&M-EST. PATIENT-LVL V: CPT | Mod: PBBFAC,,, | Performed by: FAMILY MEDICINE

## 2022-12-05 PROCEDURE — 3288F FALL RISK ASSESSMENT DOCD: CPT | Mod: CPTII,S$GLB,, | Performed by: FAMILY MEDICINE

## 2022-12-05 PROCEDURE — 3060F PR POS MICROALBUMINURIA RESULT DOCUMENTED/REVIEW: ICD-10-PCS | Mod: CPTII,S$GLB,, | Performed by: FAMILY MEDICINE

## 2022-12-05 PROCEDURE — 1157F PR ADVANCE CARE PLAN OR EQUIV PRESENT IN MEDICAL RECORD: ICD-10-PCS | Mod: CPTII,S$GLB,, | Performed by: FAMILY MEDICINE

## 2022-12-05 PROCEDURE — 3008F BODY MASS INDEX DOCD: CPT | Mod: CPTII,S$GLB,, | Performed by: FAMILY MEDICINE

## 2022-12-05 PROCEDURE — 99214 OFFICE O/P EST MOD 30 MIN: CPT | Mod: S$GLB,,, | Performed by: FAMILY MEDICINE

## 2022-12-05 PROCEDURE — 1160F PR REVIEW ALL MEDS BY PRESCRIBER/CLIN PHARMACIST DOCUMENTED: ICD-10-PCS | Mod: CPTII,S$GLB,, | Performed by: FAMILY MEDICINE

## 2022-12-05 PROCEDURE — 4010F ACE/ARB THERAPY RXD/TAKEN: CPT | Mod: CPTII,S$GLB,, | Performed by: FAMILY MEDICINE

## 2022-12-05 PROCEDURE — 3074F PR MOST RECENT SYSTOLIC BLOOD PRESSURE < 130 MM HG: ICD-10-PCS | Mod: CPTII,S$GLB,, | Performed by: FAMILY MEDICINE

## 2022-12-05 PROCEDURE — G0008 ADMIN INFLUENZA VIRUS VAC: HCPCS | Mod: S$GLB,,, | Performed by: FAMILY MEDICINE

## 2022-12-05 PROCEDURE — 36415 COLL VENOUS BLD VENIPUNCTURE: CPT | Mod: PO | Performed by: FAMILY MEDICINE

## 2022-12-05 PROCEDURE — 3288F PR FALLS RISK ASSESSMENT DOCUMENTED: ICD-10-PCS | Mod: CPTII,S$GLB,, | Performed by: FAMILY MEDICINE

## 2022-12-05 PROCEDURE — 3074F SYST BP LT 130 MM HG: CPT | Mod: CPTII,S$GLB,, | Performed by: FAMILY MEDICINE

## 2022-12-05 PROCEDURE — 1101F PR PT FALLS ASSESS DOC 0-1 FALLS W/OUT INJ PAST YR: ICD-10-PCS | Mod: CPTII,S$GLB,, | Performed by: FAMILY MEDICINE

## 2022-12-05 PROCEDURE — 3066F NEPHROPATHY DOC TX: CPT | Mod: CPTII,S$GLB,, | Performed by: FAMILY MEDICINE

## 2022-12-05 RX ORDER — LANCETS
EACH MISCELLANEOUS
Qty: 100 EACH | Refills: 5 | Status: SHIPPED | OUTPATIENT
Start: 2022-12-05

## 2022-12-05 RX ORDER — HYDROCODONE BITARTRATE AND ACETAMINOPHEN 7.5; 325 MG/1; MG/1
1 TABLET ORAL EVERY 12 HOURS PRN
Qty: 60 TABLET | Refills: 0 | Status: SHIPPED | OUTPATIENT
Start: 2023-02-10 | End: 2023-03-04 | Stop reason: SDUPTHER

## 2022-12-05 RX ORDER — FLUCONAZOLE 150 MG/1
150 TABLET ORAL DAILY
Qty: 3 TABLET | Refills: 0 | Status: SHIPPED | OUTPATIENT
Start: 2022-12-05 | End: 2023-01-04

## 2022-12-05 RX ORDER — HYDROCODONE BITARTRATE AND ACETAMINOPHEN 7.5; 325 MG/1; MG/1
1 TABLET ORAL EVERY 12 HOURS PRN
Qty: 60 TABLET | Refills: 0 | Status: SHIPPED | OUTPATIENT
Start: 2022-12-10 | End: 2022-12-11 | Stop reason: SDUPTHER

## 2022-12-05 RX ORDER — HYDROCODONE BITARTRATE AND ACETAMINOPHEN 7.5; 325 MG/1; MG/1
1 TABLET ORAL EVERY 12 HOURS PRN
Qty: 60 TABLET | Refills: 0 | Status: SHIPPED | OUTPATIENT
Start: 2023-01-10 | End: 2023-01-20

## 2022-12-05 RX ORDER — INSULIN PUMP SYRINGE, 3 ML
EACH MISCELLANEOUS
Qty: 1 EACH | Refills: 1 | Status: SHIPPED | OUTPATIENT
Start: 2022-12-05 | End: 2023-12-05

## 2022-12-05 NOTE — PROGRESS NOTES
Subjective:       Patient ID: Nelly Tian is a 72 y.o. female.    Chief Complaint: Annual Exam    Active Medical History:  SUBJECTIVE: Nelly Tian is a 70 y.o. female seen for a follow up visit; she has diabetes, hypertension, hyperlipidemia, peripheral vascular disease and obesity.  Endocrine ROS: positive for - malaise/lethargy, polydipsia/polyuria and skin changes  negative for - breast changes, hot flashes, mood swings or temperature intolerance  Diabetic Review of Systems - medication compliance: compliant most of the time, diabetic diet compliance: noncompliant some of the time, home glucose monitoring: is performed sporadically, further diabetic ROS: no chest pain, dyspnea or TIA's, no numbness, tingling or pain in extremities, no hypoglycemia, no medication side effects noted, weight has increased, has dysesthesias in the feet, last eye exam approximately less than 1 yr ago.  The patient complains of chronic fatigue with reduce EF 40%.  Chronic back pain with multiple-level degenerative joint disease .  Lumbar radiculopathy, bladder incontinence.   Current Outpatient Medications:  albuterol (ACCUNEB) 0.63 mg/3 mL Nebu, Take 3 mLs (0.63 mg total) by nebulization every 6 (six) hours as needed. Rescue, Disp: 75 mL, Rfl: 11  albuterol (PROVENTIL/VENTOLIN HFA) 90 mcg/actuation inhaler, Inhale 2 puffs into the lungs every 6 (six) hours as needed for Wheezing. Rescue, Disp: 54 g, Rfl: 3  apixaban (ELIQUIS) 5 mg Tab, Take 1 tablet (5 mg total) by mouth 2 (two) times daily., Disp: 180 tablet, Rfl: 3  ascorbic acid, vitamin C, (VITAMIN C) 500 MG tablet, Take 2 tablets (1,000 mg total) by mouth every evening., Disp: , Rfl:   atorvastatin (LIPITOR) 10 MG tablet, Take 1 tablet (10 mg total) by mouth every other day., Disp: 35 tablet, Rfl: 3  BREO ELLIPTA 100-25 mcg/dose diskus inhaler, INHALE 1 PUFF EVERY DAY, Disp: 60 each, Rfl: 1  budesonide (PULMICORT) 0.5 mg/2 mL nebulizer solution, INHALE THE  CONTENTS OF 1 VIAL VIA NEBULIZER EVERY DAY, Disp: 180 mL, Rfl: 0  clonazePAM (KLONOPIN) 0.5 MG tablet, Take 1 tablet (0.5 mg total) by mouth 2 (two) times daily as needed for Anxiety., Disp: 60 tablet, Rfl: 2  gabapentin (NEURONTIN) 600 MG tablet, Take 1 tablet (600 mg total) by mouth 3 (three) times daily., Disp: 270 tablet, Rfl: 3  HYDROcodone-acetaminophen (NORCO) 7.5-325 mg per tablet, Take 1 tablet by mouth nightly as needed for Pain. Medical necessary for greater than 7 days for chronic pain. Hydrocodone is contraindicated with Klonopin., Disp: 60 tablet, Rfl: 0  [START ON 11/6/2021] HYDROcodone-acetaminophen (NORCO) 7.5-325 mg per tablet, Take 1 tablet by mouth every 12 (twelve) hours as needed for Pain. Medical necessary for greater than 7 days for chronic pain. Hydrocodone is contraindicated with Klonopin., Disp: 60 tablet, Rfl: 0  levalbuterol (XOPENEX) 0.63 mg/3 mL nebulizer solution, INHALE THE CONTENTS OF 1 VIAL BY NEBULIZATION 4 times  DAILY.    DX: J43.9, Disp: 792 mL, Rfl: 3  LIDOcaine (LIDODERM) 5 %, Remove & Discard patch within 12 hours or as directed by MD, Disp: 60 patch, Rfl: 3  lisinopriL (PRINIVIL,ZESTRIL) 20 MG tablet, TAKE 1 TABLET TWICE DAILY, Disp: 180 tablet, Rfl: 4  metFORMIN (GLUCOPHAGE) 500 MG tablet, TAKE 1 TABLET (500 MG TOTAL) BY MOUTH DAILY WITH BREAKFAST., Disp: 90 tablet, Rfl: 3  multivitamin (THERAGRAN) tablet, Take 1 tablet by mouth once daily., Disp: , Rfl:   nystatin (NYSTOP) powder, Apply topically 2 (two) times daily., Disp: 120 g, Rfl: 11  nystatin-triamcinolone (MYCOLOG II) cream, Apply topically 3 (three) times daily. to affected area, Disp: 30 g, Rfl: 3  omeprazole (PRILOSEC) 20 MG capsule, TAKE 1 CAPSULE TWICE DAILY  BEFORE  MEALS, Disp: 180 capsule, Rfl: 3  ondansetron (ZOFRAN-ODT) 8 MG TbDL, DISSOLVE 1 TABLET ON THE TONGUE EVERY 8 HOURS AS NEEDED, Disp: 40 tablet, Rfl: 1  potassium chloride SA (K-DUR,KLOR-CON) 20 MEQ tablet, TAKE 1 TABLET(20 MEQ) BY MOUTH EVERY DAY,  Disp: 30 tablet, Rfl: 11  TRELEGY ELLIPTA 100-62.5-25 mcg DsDv, INHALE 1 PUFF INTO THE LUNGS ONE TIME DAILY, Disp: 180 each, Rfl: 3  fluticasone propionate (FLONASE) 50 mcg/actuation nasal spray, SHAKE LIQUID AND USE 1 SPRAY(50 MCG) IN EACH NOSTRIL EVERY DAY, Disp: 16 g, Rfl: 11  furosemide (LASIX) 40 MG tablet, TAKE 1 TABLET(40 MG) BY MOUTH EVERY DAY, Disp: 90 tablet, Rfl: 3  semaglutide (OZEMPIC) 0.25 mg or 0.5 mg(2 mg/1.5 mL) pen injector, Start 0.25 mg weekly x 2 weeks then 0.5 mg weekly therafter, Disp: 4 pen, Rfl: 11    No current facility-administered medications for this visit.    Patient Active Problem List:     Severe obesity (BMI 35.0-35.9 with comorbidity)     Diabetes mellitus with neuropathy     Long term current use of anticoagulant therapy     Major depression, recurrent, chronic     GERD (gastroesophageal reflux disease)     Tobacco dependency     Chronic atrial fibrillation     Hypertension associated with diabetes     PVD (peripheral vascular disease)     Abdominal aortic atherosclerosis     Dependency on pain medication     Iron deficiency anemia     DDD (degenerative disc disease), lumbar     Type 2 diabetes mellitus with diabetic neuropathy, without long-term current use of insulin     Hyperlipidemia associated with type 2 diabetes mellitus     NSAID long-term use     Mixed restrictive and obstructive lung disease     Hypercapnic respiratory failure, chronic     COPD (chronic obstructive pulmonary disease)     Hepatomegaly     Chronic combined systolic and diastolic CHF (congestive heart failure)     Decubitus ulcer     Type 2 diabetes mellitus, without long-term current use of insulin     Retinal detachment of left eye with multiple breaks     Chronic pain syndrome     Other nonthrombocytopenic purpura     Encephalopathy, metabolic     Chronically on benzodiazepine therapy     Chronic prescription opiate use     Flank hernia     Iron deficiency anemia due to sideropenic dysphagia     Pressure  ulcer of other site, stage 3/resolved        Diabetes  She presents for her follow-up diabetic visit. She has type 2 diabetes mellitus. Onset time: several. Her disease course has been improving. Hypoglycemia symptoms include nervousness/anxiousness. Pertinent negatives for hypoglycemia include no confusion, mood changes, pallor or tremors. Associated symptoms include fatigue, foot paresthesias and visual change. Pertinent negatives for diabetes include no blurred vision, no polydipsia, no polyuria and no weakness. There are no hypoglycemic complications. Symptoms are improving. Diabetic complications include autonomic neuropathy, nephropathy, peripheral neuropathy and PVD. Risk factors for coronary artery disease include diabetes mellitus, hypertension, post-menopausal, sedentary lifestyle, stress and tobacco exposure. Current diabetic treatment includes oral agent (dual therapy) and insulin injections. She is compliant with treatment most of the time. Her home blood glucose trend is fluctuating minimally. Her breakfast blood glucose is taken between 9-10 am. Her breakfast blood glucose range is generally 130-140 mg/dl.   Review of Systems   Constitutional:  Positive for fatigue. Negative for appetite change and unexpected weight change.   HENT:  Negative for congestion, hearing loss and sinus pain.    Eyes:  Positive for visual disturbance. Negative for blurred vision.   Respiratory:  Positive for cough, chest tightness and shortness of breath. Negative for wheezing.    Cardiovascular:  Negative for palpitations and leg swelling.   Gastrointestinal:  Positive for abdominal distention. Negative for nausea.   Endocrine: Negative for polydipsia and polyuria.   Genitourinary:  Positive for urgency. Negative for frequency.   Musculoskeletal:  Positive for arthralgias, back pain, gait problem, joint swelling, myalgias and neck stiffness.   Skin:  Positive for rash. Negative for pallor.   Allergic/Immunologic: Negative  for environmental allergies.   Neurological:  Negative for tremors, weakness and light-headedness.   Psychiatric/Behavioral:  Positive for behavioral problems. Negative for confusion and decreased concentration. The patient is nervous/anxious.      Patient Active Problem List   Diagnosis    Severe obesity (BMI 35.0-35.9 with comorbidity)    Long term current use of anticoagulant therapy    Major depression, recurrent, chronic    GERD (gastroesophageal reflux disease)    Personal history of tobacco use    Chronic atrial fibrillation    Hypertension associated with diabetes    PVD (peripheral vascular disease)    Abdominal aortic atherosclerosis    Iron deficiency anemia    DDD (degenerative disc disease), lumbar    Type 2 diabetes mellitus with diabetic neuropathy, without long-term current use of insulin    Hyperlipidemia associated with type 2 diabetes mellitus    ACP (advance care planning)    Mixed restrictive and obstructive lung disease    Hypercapnic respiratory failure, chronic    COPD (chronic obstructive pulmonary disease)    Acute on chronic respiratory failure with hypoxia    Hyperkalemia    Chest pain    Hepatomegaly    Cellulitis    Chronic combined systolic and diastolic CHF (congestive heart failure)    Retinal detachment of left eye with multiple breaks    Chronic pain syndrome    Cellulitis of abdominal wall    Encephalopathy, metabolic    Uncomplicated opioid dependence    Flank hernia    Iron deficiency anemia due to sideropenic dysphagia       Objective:      Physical Exam  Vitals and nursing note reviewed.   Constitutional:       Appearance: She is well-developed. She is obese. She is ill-appearing.   HENT:      Right Ear: Decreased hearing noted.      Left Ear: Decreased hearing noted.   Cardiovascular:      Rate and Rhythm: Normal rate and regular rhythm.      Pulses:           Dorsalis pedis pulses are 3+ on the right side and 3+ on the left side.        Posterior tibial pulses are 3+ on the  right side and 3+ on the left side.      Heart sounds: Normal heart sounds.   Pulmonary:      Effort: Pulmonary effort is normal.      Breath sounds: Normal breath sounds.   Abdominal:      General: Abdomen is protuberant. Bowel sounds are normal.      Palpations: There is hepatomegaly and mass.      Tenderness: There is generalized abdominal tenderness.      Hernia: A hernia is present. Hernia is present in the ventral area.          Comments: Huge hernia with external bowel sounds of small bowels and large bowels.  Palpable spleen 8 cm   Musculoskeletal:      Lumbar back: Spasms and tenderness present. Decreased range of motion.      Comments: Patient has moderate lumbar pain.  The pain he has been localized to the lumbar area and has a radiated to both hips.  Will results with present medications.  Patient complains difficult to walk and ambulate.   Feet:      Right foot:      Protective Sensation: 6 sites tested.  6 sites sensed.      Skin integrity: No ulcer, blister or skin breakdown.      Left foot:      Protective Sensation: 6 sites tested.  6 sites sensed.      Skin integrity: No ulcer, blister or skin breakdown.   Skin:     General: Skin is warm and dry.   Neurological:      Mental Status: She is alert and oriented to person, place, and time.   Psychiatric:         Attention and Perception: Attention normal.         Mood and Affect: Mood normal. Affect is labile.         Speech: Speech normal.         Cognition and Memory: Cognition and memory normal.         Judgment: Judgment normal.       Lab Results   Component Value Date    WBC 8.45 08/19/2022    HGB 11.7 (L) 08/19/2022    HCT 36.4 (L) 08/19/2022     08/19/2022    CHOL 89 (L) 03/25/2022    TRIG 52 03/25/2022    HDL 37 (L) 03/25/2022    ALT 22 08/19/2022    AST 21 08/19/2022     08/19/2022    K 4.3 08/19/2022    CL 98 08/19/2022    CREATININE 0.6 08/19/2022    BUN 15 08/19/2022    CO2 29 08/19/2022    TSH 1.309 01/11/2021    INR 1.0  06/27/2019    HGBA1C 5.1 07/22/2022     The ASCVD Risk score (Deonte CANO, et al., 2019) failed to calculate for the following reasons:    The valid total cholesterol range is 130 to 320 mg/dL    Assessment:       1. CHF (congestive heart failure), NYHA class IV, chronic, systolic    2. Type 2 diabetes mellitus with diabetic peripheral angiopathy without gangrene, without long-term current use of insulin    3. Lumbar degenerative disc disease    4. Therapeutic drug monitoring        Plan:       CHF (congestive heart failure), NYHA class IV, chronic, systolic  -     Ambulatory referral/consult to Cardiac Rehab; Future; Expected date: 12/12/2022  -     Ambulatory referral/consult to Outpatient Case Management    Type 2 diabetes mellitus with diabetic peripheral angiopathy without gangrene, without long-term current use of insulin  -     empagliflozin (JARDIANCE) 10 mg tablet; Take 1 tablet (10 mg total) by mouth once daily.  Dispense: 90 tablet; Refill: 3  -     Comprehensive metabolic panel; Future; Expected date: 12/05/2022  -     Hemoglobin A1c; Future; Expected date: 12/05/2022  -     Ambulatory referral/consult to Outpatient Case Management  -     blood-glucose meter kit; To check BG 2 times daily, to use with insurance preferred meter  Dispense: 1 each; Refill: 1  -     lancets Misc; To check BG 2 times daily, to use with insurance preferred meter  Dispense: 100 each; Refill: 5  -     blood sugar diagnostic Strp; To check BG 2 times daily, to use with insurance preferred meter  Dispense: 100 each; Refill: 5    Lumbar degenerative disc disease  -     HYDROcodone-acetaminophen (NORCO) 7.5-325 mg per tablet; Take 1 tablet by mouth every 12 (twelve) hours as needed for Pain. Medical necessary for greater than 7 days for chronic pain.  Dispense: 60 tablet; Refill: 0  -     HYDROcodone-acetaminophen (NORCO) 7.5-325 mg per tablet; Take 1 tablet by mouth every 12 (twelve) hours as needed for Pain. Medical necessary for  greater than 7 days for chronic pain.  Dispense: 60 tablet; Refill: 0  -     HYDROcodone-acetaminophen (NORCO) 7.5-325 mg per tablet; Take 1 tablet by mouth every 12 (twelve) hours as needed for Pain. Medical necessary for greater than 7 days for chronic pain.  Dispense: 60 tablet; Refill: 0    Therapeutic drug monitoring  -     HYDROcodone-acetaminophen (NORCO) 7.5-325 mg per tablet; Take 1 tablet by mouth every 12 (twelve) hours as needed for Pain. Medical necessary for greater than 7 days for chronic pain.  Dispense: 60 tablet; Refill: 0  -     HYDROcodone-acetaminophen (NORCO) 7.5-325 mg per tablet; Take 1 tablet by mouth every 12 (twelve) hours as needed for Pain. Medical necessary for greater than 7 days for chronic pain.  Dispense: 60 tablet; Refill: 0  -     HYDROcodone-acetaminophen (NORCO) 7.5-325 mg per tablet; Take 1 tablet by mouth every 12 (twelve) hours as needed for Pain. Medical necessary for greater than 7 days for chronic pain.  Dispense: 60 tablet; Refill: 0    Other orders  -     fluconazole (DIFLUCAN) 150 MG Tab; Take 1 tablet (150 mg total) by mouth once daily.  Dispense: 3 tablet; Refill: 0    Lab Results   Component Value Date    HGBA1C 5.1 07/22/2022     DM.The current medical regimen is effective;  continue present plan and medications.  Ht.Stable and controlled. Continue current treatment plan as previously prescribed  Patient readiness: acceptance and barriers:readiness, social stressors and environmental  I have checked the patient's  prior to prescribing opioids.  I have recommended that this patient have a immunization for Covid and smoking cessation but she declines at this time. I have discussed the risks and benefits of this procedure with her. The patient verbalizes understanding.  She will use Ozempic due to Heart and weight loss indications.  35-minute visit. 20 minutes spent counseling patient on diet, exercise, and weight loss.  This has been fully explained to the patient,  who indicates understanding.  Review plate method  Review foods in home  Discuss food labels  Refer to exercise program  Assist with medical visit preparation  Review patient results  Review patient education about results  Review chronic disease interventions (see specific disease intervention)  Assess knowledge level  Provide and discuss information/education material  Encourage communication with physician  Monitor compliance  Educate on risk of non-compliance  Give food and resource list  Review patient education material (see patient instructions)  Review patient education material (see patient instructions)  Refer to exercise program  Review stress management techniques  Review patient education material (see patient instructions)  Assess support system  Discuss stress management techniques  Review normal ranges for labs    Discussed health maintenance guidelines appropriate for age.  Discussed health maintenance guidelines appropriate for age.  Discussed health maintenance guidelines appropriate for age.  Discussed health maintenance guidelines appropriate for age.  Discussed health maintenance guidelines appropriate for age.

## 2022-12-06 LAB
ESTIMATED AVG GLUCOSE: 111 MG/DL (ref 68–131)
HBA1C MFR BLD: 5.5 % (ref 4–5.6)

## 2022-12-11 ENCOUNTER — PATIENT MESSAGE (OUTPATIENT)
Dept: FAMILY MEDICINE | Facility: CLINIC | Age: 72
End: 2022-12-11
Payer: MEDICARE

## 2022-12-11 DIAGNOSIS — M51.36 LUMBAR DEGENERATIVE DISC DISEASE: ICD-10-CM

## 2022-12-11 DIAGNOSIS — Z51.81 THERAPEUTIC DRUG MONITORING: ICD-10-CM

## 2022-12-11 RX ORDER — HYDROCODONE BITARTRATE AND ACETAMINOPHEN 7.5; 325 MG/1; MG/1
1 TABLET ORAL EVERY 12 HOURS PRN
Qty: 14 TABLET | Refills: 0 | Status: SHIPPED | OUTPATIENT
Start: 2022-12-11 | End: 2022-12-30 | Stop reason: SDUPTHER

## 2022-12-21 ENCOUNTER — PATIENT MESSAGE (OUTPATIENT)
Dept: FAMILY MEDICINE | Facility: CLINIC | Age: 72
End: 2022-12-21
Payer: MEDICARE

## 2022-12-21 RX ORDER — POTASSIUM CHLORIDE 20 MEQ/1
20 TABLET, EXTENDED RELEASE ORAL DAILY
COMMUNITY
Start: 2022-11-25 | End: 2023-04-18 | Stop reason: SDUPTHER

## 2022-12-30 DIAGNOSIS — Z51.81 THERAPEUTIC DRUG MONITORING: ICD-10-CM

## 2022-12-30 DIAGNOSIS — M51.36 LUMBAR DEGENERATIVE DISC DISEASE: ICD-10-CM

## 2022-12-30 NOTE — TELEPHONE ENCOUNTER
Care Due:                  Date            Visit Type   Department     Provider  --------------------------------------------------------------------------------                                EP -                              PRIMARY      [facility]  Last Visit: 12-      CARE ([facility])   ONOFRE Bird                              EP -                              PRIMARY      [facility]  Next Visit: 04-      CARE ([facility])   ONOFRE Bird                                                            Last  Test          Frequency    Reason                     Performed    Due Date  --------------------------------------------------------------------------------    Lipid Panel.  12 months..  atorvastatin.............  03- 03-    [facility] Embedded Care Gaps. Reference number: [id number]. 12/30/2022   3:50:55 PM CST

## 2023-01-01 ENCOUNTER — PATIENT MESSAGE (OUTPATIENT)
Dept: FAMILY MEDICINE | Facility: CLINIC | Age: 73
End: 2023-01-01
Payer: MEDICARE

## 2023-01-01 RX ORDER — HYDROCODONE BITARTRATE AND ACETAMINOPHEN 7.5; 325 MG/1; MG/1
1 TABLET ORAL EVERY 12 HOURS PRN
Qty: 30 TABLET | Refills: 0 | Status: SHIPPED | OUTPATIENT
Start: 2023-02-03 | End: 2023-01-31

## 2023-01-01 RX ORDER — HYDROCODONE BITARTRATE AND ACETAMINOPHEN 7.5; 325 MG/1; MG/1
1 TABLET ORAL EVERY 12 HOURS PRN
Qty: 30 TABLET | Refills: 0 | Status: SHIPPED | OUTPATIENT
Start: 2023-01-03 | End: 2023-01-10

## 2023-01-02 ENCOUNTER — PATIENT MESSAGE (OUTPATIENT)
Dept: FAMILY MEDICINE | Facility: CLINIC | Age: 73
End: 2023-01-02
Payer: MEDICARE

## 2023-01-02 NOTE — TELEPHONE ENCOUNTER
I have checked the patient's  prior to prescribing opioids.  Requested Prescriptions     Signed Prescriptions Disp Refills    HYDROcodone-acetaminophen (NORCO) 7.5-325 mg per tablet 30 tablet 0     Sig: Take 1 tablet by mouth every 12 (twelve) hours as needed for Pain. Medical necessary for greater than 7 days for chronic pain.     Authorizing Provider: KANG ESPINO    HYDROcodone-acetaminophen (NORCO) 7.5-325 mg per tablet 30 tablet 0     Sig: Take 1 tablet by mouth every 12 (twelve) hours as needed for Pain. Medical necessary for greater than 7 days for chronic pain.     Authorizing Provider: KANG ESPINO

## 2023-01-03 ENCOUNTER — PATIENT MESSAGE (OUTPATIENT)
Dept: FAMILY MEDICINE | Facility: CLINIC | Age: 73
End: 2023-01-03
Payer: MEDICARE

## 2023-01-31 ENCOUNTER — HOSPITAL ENCOUNTER (OUTPATIENT)
Facility: HOSPITAL | Age: 73
Discharge: HOME | End: 2023-02-01
Attending: EMERGENCY MEDICINE | Admitting: INTERNAL MEDICINE
Payer: MEDICARE

## 2023-01-31 DIAGNOSIS — R00.1 BRADYCARDIA: ICD-10-CM

## 2023-01-31 DIAGNOSIS — R07.9 CHEST PAIN: Primary | ICD-10-CM

## 2023-01-31 LAB
ALBUMIN SERPL BCP-MCNC: 3.9 G/DL (ref 3.5–5.2)
ALP SERPL-CCNC: 59 U/L (ref 55–135)
ALT SERPL W/O P-5'-P-CCNC: 24 U/L (ref 10–44)
ANION GAP SERPL CALC-SCNC: 12 MMOL/L (ref 8–16)
AST SERPL-CCNC: 31 U/L (ref 10–40)
BASOPHILS # BLD AUTO: 0.07 K/UL (ref 0–0.2)
BASOPHILS NFR BLD: 0.7 % (ref 0–1.9)
BILIRUB SERPL-MCNC: 0.8 MG/DL (ref 0.1–1)
BNP SERPL-MCNC: 110 PG/ML (ref 0–99)
BUN SERPL-MCNC: 24 MG/DL (ref 8–23)
CALCIUM SERPL-MCNC: 10.1 MG/DL (ref 8.7–10.5)
CHLORIDE SERPL-SCNC: 93 MMOL/L (ref 95–110)
CO2 SERPL-SCNC: 29 MMOL/L (ref 23–29)
CREAT SERPL-MCNC: 1 MG/DL (ref 0.5–1.4)
DIFFERENTIAL METHOD: ABNORMAL
EOSINOPHIL # BLD AUTO: 0.1 K/UL (ref 0–0.5)
EOSINOPHIL NFR BLD: 0.7 % (ref 0–8)
ERYTHROCYTE [DISTWIDTH] IN BLOOD BY AUTOMATED COUNT: 13.8 % (ref 11.5–14.5)
EST. GFR  (NO RACE VARIABLE): >60 ML/MIN/1.73 M^2
GLUCOSE SERPL-MCNC: 109 MG/DL (ref 70–110)
GLUCOSE SERPL-MCNC: 112 MG/DL (ref 70–110)
HCT VFR BLD AUTO: 41.2 % (ref 37–48.5)
HGB BLD-MCNC: 13.6 G/DL (ref 12–16)
IMM GRANULOCYTES # BLD AUTO: 0.05 K/UL (ref 0–0.04)
IMM GRANULOCYTES NFR BLD AUTO: 0.5 % (ref 0–0.5)
LYMPHOCYTES # BLD AUTO: 1.1 K/UL (ref 1–4.8)
LYMPHOCYTES NFR BLD: 11.4 % (ref 18–48)
MCH RBC QN AUTO: 31.1 PG (ref 27–31)
MCHC RBC AUTO-ENTMCNC: 33 G/DL (ref 32–36)
MCV RBC AUTO: 94 FL (ref 82–98)
MONOCYTES # BLD AUTO: 0.9 K/UL (ref 0.3–1)
MONOCYTES NFR BLD: 9 % (ref 4–15)
NEUTROPHILS # BLD AUTO: 7.7 K/UL (ref 1.8–7.7)
NEUTROPHILS NFR BLD: 77.7 % (ref 38–73)
NRBC BLD-RTO: 0 /100 WBC
PLATELET # BLD AUTO: 213 K/UL (ref 150–450)
PMV BLD AUTO: 9.8 FL (ref 9.2–12.9)
POTASSIUM SERPL-SCNC: 3.9 MMOL/L (ref 3.5–5.1)
PROT SERPL-MCNC: 7.2 G/DL (ref 6–8.4)
RBC # BLD AUTO: 4.38 M/UL (ref 4–5.4)
SODIUM SERPL-SCNC: 134 MMOL/L (ref 136–145)
TROPONIN I SERPL HS-MCNC: 10.5 PG/ML (ref 0–14.9)
TROPONIN I SERPL HS-MCNC: 13.2 PG/ML (ref 0–14.9)
WBC # BLD AUTO: 9.97 K/UL (ref 3.9–12.7)

## 2023-01-31 PROCEDURE — 85025 COMPLETE CBC W/AUTO DIFF WBC: CPT | Performed by: NURSE PRACTITIONER

## 2023-01-31 PROCEDURE — 84484 ASSAY OF TROPONIN QUANT: CPT | Mod: 91 | Performed by: NURSE PRACTITIONER

## 2023-01-31 PROCEDURE — G0378 HOSPITAL OBSERVATION PER HR: HCPCS

## 2023-01-31 PROCEDURE — 25000003 PHARM REV CODE 250: Performed by: INTERNAL MEDICINE

## 2023-01-31 PROCEDURE — 80053 COMPREHEN METABOLIC PANEL: CPT | Performed by: NURSE PRACTITIONER

## 2023-01-31 PROCEDURE — 99285 EMERGENCY DEPT VISIT HI MDM: CPT | Mod: 25

## 2023-01-31 PROCEDURE — 99900031 HC PATIENT EDUCATION (STAT)

## 2023-01-31 PROCEDURE — 36415 COLL VENOUS BLD VENIPUNCTURE: CPT | Performed by: INTERNAL MEDICINE

## 2023-01-31 PROCEDURE — 94761 N-INVAS EAR/PLS OXIMETRY MLT: CPT

## 2023-01-31 PROCEDURE — 83880 ASSAY OF NATRIURETIC PEPTIDE: CPT | Performed by: NURSE PRACTITIONER

## 2023-01-31 PROCEDURE — 93010 ELECTROCARDIOGRAM REPORT: CPT | Mod: ,,, | Performed by: INTERNAL MEDICINE

## 2023-01-31 PROCEDURE — 93010 EKG 12-LEAD: ICD-10-PCS | Mod: ,,, | Performed by: INTERNAL MEDICINE

## 2023-01-31 PROCEDURE — 93005 ELECTROCARDIOGRAM TRACING: CPT | Performed by: INTERNAL MEDICINE

## 2023-01-31 PROCEDURE — 84484 ASSAY OF TROPONIN QUANT: CPT | Mod: 91 | Performed by: INTERNAL MEDICINE

## 2023-01-31 PROCEDURE — 99900035 HC TECH TIME PER 15 MIN (STAT)

## 2023-01-31 RX ORDER — TALC
9 POWDER (GRAM) TOPICAL NIGHTLY PRN
Status: DISCONTINUED | OUTPATIENT
Start: 2023-01-31 | End: 2023-02-01 | Stop reason: HOSPADM

## 2023-01-31 RX ORDER — FUROSEMIDE 40 MG/1
40 TABLET ORAL DAILY
Status: DISCONTINUED | OUTPATIENT
Start: 2023-02-01 | End: 2023-02-01 | Stop reason: HOSPADM

## 2023-01-31 RX ORDER — ALBUTEROL SULFATE 0.83 MG/ML
2.5 SOLUTION RESPIRATORY (INHALATION) EVERY 6 HOURS PRN
Status: DISCONTINUED | OUTPATIENT
Start: 2023-02-01 | End: 2023-02-01 | Stop reason: HOSPADM

## 2023-01-31 RX ORDER — POTASSIUM CHLORIDE 20 MEQ/1
20 TABLET, EXTENDED RELEASE ORAL DAILY
Status: DISCONTINUED | OUTPATIENT
Start: 2023-02-01 | End: 2023-02-01 | Stop reason: HOSPADM

## 2023-01-31 RX ORDER — PROCHLORPERAZINE EDISYLATE 5 MG/ML
5 INJECTION INTRAMUSCULAR; INTRAVENOUS EVERY 6 HOURS PRN
Status: DISCONTINUED | OUTPATIENT
Start: 2023-01-31 | End: 2023-02-01 | Stop reason: HOSPADM

## 2023-01-31 RX ORDER — PANTOPRAZOLE SODIUM 40 MG/1
40 TABLET, DELAYED RELEASE ORAL DAILY
Status: DISCONTINUED | OUTPATIENT
Start: 2023-02-01 | End: 2023-02-01 | Stop reason: HOSPADM

## 2023-01-31 RX ORDER — ONDANSETRON 4 MG/1
8 TABLET, ORALLY DISINTEGRATING ORAL EVERY 12 HOURS PRN
Status: DISCONTINUED | OUTPATIENT
Start: 2023-01-31 | End: 2023-02-01 | Stop reason: HOSPADM

## 2023-01-31 RX ORDER — GABAPENTIN 300 MG/1
600 CAPSULE ORAL 3 TIMES DAILY
Status: DISCONTINUED | OUTPATIENT
Start: 2023-01-31 | End: 2023-02-01 | Stop reason: HOSPADM

## 2023-01-31 RX ORDER — SPIRONOLACTONE 25 MG/1
50 TABLET ORAL DAILY
Status: DISCONTINUED | OUTPATIENT
Start: 2023-02-01 | End: 2023-02-01 | Stop reason: HOSPADM

## 2023-01-31 RX ORDER — ACETAMINOPHEN 10 MG/ML
1000 INJECTION, SOLUTION INTRAVENOUS EVERY 8 HOURS
Status: DISCONTINUED | OUTPATIENT
Start: 2023-01-31 | End: 2023-02-01 | Stop reason: HOSPADM

## 2023-01-31 RX ORDER — SODIUM CHLORIDE 0.9 % (FLUSH) 0.9 %
10 SYRINGE (ML) INJECTION
Status: DISCONTINUED | OUTPATIENT
Start: 2023-01-31 | End: 2023-02-01 | Stop reason: HOSPADM

## 2023-01-31 RX ORDER — ACETAMINOPHEN, DIPHENHYDRAMINE HCL, PHENYLEPHRINE HCL 325; 25; 5 MG/1; MG/1; MG/1
10 TABLET ORAL NIGHTLY
COMMUNITY

## 2023-01-31 RX ORDER — TALC
6 POWDER (GRAM) TOPICAL NIGHTLY PRN
Status: DISCONTINUED | OUTPATIENT
Start: 2023-01-31 | End: 2023-01-31

## 2023-01-31 RX ORDER — ATORVASTATIN CALCIUM 10 MG/1
10 TABLET, FILM COATED ORAL EVERY OTHER DAY
Status: DISCONTINUED | OUTPATIENT
Start: 2023-02-01 | End: 2023-02-01 | Stop reason: HOSPADM

## 2023-01-31 RX ORDER — MELATONIN 1 MG/ML
5 LIQUID (ML) ORAL NIGHTLY
Status: DISCONTINUED | OUTPATIENT
Start: 2023-01-31 | End: 2023-01-31

## 2023-01-31 RX ORDER — ACETAMINOPHEN 325 MG/1
650 TABLET ORAL EVERY 4 HOURS PRN
Status: DISCONTINUED | OUTPATIENT
Start: 2023-01-31 | End: 2023-02-01 | Stop reason: HOSPADM

## 2023-01-31 RX ORDER — FLUTICASONE PROPIONATE 50 MCG
2 SPRAY, SUSPENSION (ML) NASAL DAILY
Status: DISCONTINUED | OUTPATIENT
Start: 2023-02-01 | End: 2023-02-01 | Stop reason: HOSPADM

## 2023-01-31 RX ORDER — ALBUTEROL SULFATE 90 UG/1
2 AEROSOL, METERED RESPIRATORY (INHALATION) EVERY 6 HOURS PRN
Status: DISCONTINUED | OUTPATIENT
Start: 2023-01-31 | End: 2023-01-31

## 2023-01-31 RX ORDER — MORPHINE SULFATE 4 MG/ML
4 INJECTION, SOLUTION INTRAMUSCULAR; INTRAVENOUS EVERY 4 HOURS PRN
Status: DISCONTINUED | OUTPATIENT
Start: 2023-01-31 | End: 2023-01-31

## 2023-01-31 RX ORDER — LISINOPRIL 20 MG/1
20 TABLET ORAL 2 TIMES DAILY
Status: DISCONTINUED | OUTPATIENT
Start: 2023-01-31 | End: 2023-02-01 | Stop reason: HOSPADM

## 2023-01-31 RX ADMIN — GABAPENTIN 600 MG: 300 CAPSULE ORAL at 09:01

## 2023-01-31 RX ADMIN — APIXABAN 5 MG: 5 TABLET, FILM COATED ORAL at 09:01

## 2023-01-31 RX ADMIN — MELATONIN 9 MG: at 11:01

## 2023-01-31 NOTE — ED PROVIDER NOTES
Encounter Date: 1/31/2023       History     Chief Complaint   Patient presents with    Chest Pain     Chest pain since 10 am radiating down the left arm and into back. EMS gave 0.4 sublingual nitro and 324 ASA.      72-year-old female with past medical history of diabetes, hypertension, atrial fibrillation on Eliquis, morbid obesity, former smoker but vapes now, presents emergency department with chest pain.  She says she was been having chest pain since about 130 this afternoon.  She describes it as a heaviness in her left chest.  She said she felt it radiate down her left arm and cause some tingling.  She says she called EMS and they roving gave her 324 mg of aspirin.  They also gave her 1 spray of nitro which brought her pressure from the 180s down to the 115 range systolic.  It brought her pain down from a 5 down to a 1.  Currently patient states she has no pain.  She does it is not had any recent stress test.  The pain did not make her short of breath or vomit.  The pain did not make her sweat.  She says that she does not have any stents in her heart.  No recent stress test.  She does not have a cardiologist.    Review of patient's allergies indicates:   Allergen Reactions    Dilaudid [hydromorphone] Anaphylaxis     Other reaction(s): Anaphylaxis  Other reaction(s): Unknown    Zyvox [linezolid in dextrose 5%] Shortness Of Breath     Past Medical History:   Diagnosis Date    *Atrial flutter     Angina pectoris 9/18/2017    Anxiety     Arthritis     Asthma     Atrial fibrillation     Back pain     Cataract     OD    CHF (congestive heart failure)     Chronically on benzodiazepine therapy 5/4/2019    COPD (chronic obstructive pulmonary disease)     Depression     Diabetes mellitus     Emphysema of lung     Heart failure     Hepatomegaly 2/3/2016    Hernia     History of MI (myocardial infarction) 1/19/2016    Hypercapnic respiratory failure, chronic 11/16/2016    Hyperlipidemia     Hypertension     Iron deficiency  anemia 2/3/2016    Myocardial infarction     Obesity     Peripheral vascular disease     Pneumonia     Polyneuropathy     Retinal detachment     OS    Septic shock 2017    Skin ulcer 3/18/2017    Tobacco dependence     Type II 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, not stated as uncontrolled(250.60)      Past Surgical History:   Procedure Laterality Date    BREAST BIOPSY Left 10 yrs ago    benign    CATARACT EXTRACTION Left     OS      SECTION      x2    EYE SURGERY      HYSTERECTOMY      partial    OOPHORECTOMY      one ovary conserved    RETINAL DETACHMENT SURGERY      buckle --OS    TONSILLECTOMY       Family History   Problem Relation Age of Onset    Alcohol abuse Mother     Cirrhosis Mother     Arthritis Father     Heart disease Father     Heart attack Father     Arrhythmia Father     Coronary artery disease Father     Coronary artery disease Sister     Early death Sister 30        heart disease    Heart disease Sister 32        MI    Heart attack Sister     Arthritis Sister     Heart disease Sister     Crohn's disease Sister     Cancer Brother         Lung cancer    Lung cancer Brother     No Known Problems Brother     No Known Problems Daughter     Blindness Son         due to accident//    Breast cancer Neg Hx     Glaucoma Neg Hx     Macular degeneration Neg Hx     Retinal detachment Neg Hx     Amblyopia Neg Hx     Diabetes Neg Hx     Cataracts Neg Hx     Hypertension Neg Hx     Strabismus Neg Hx     Stroke Neg Hx     Thyroid disease Neg Hx      Social History     Tobacco Use    Smoking status: Former     Packs/day: 0.30     Years: 50.00     Pack years: 15.00     Types: Cigarettes     Start date: 1968     Quit date: 2022     Years since quittin.5    Smokeless tobacco: Never   Substance Use Topics    Alcohol use: No     Alcohol/week: 0.0 standard drinks    Drug use: No     Review of Systems   Constitutional:  Negative for chills and fever.   HENT:   Negative for sore throat.    Respiratory:  Negative for shortness of breath.    Cardiovascular:  Positive for chest pain. Negative for palpitations.   Gastrointestinal:  Negative for abdominal pain, diarrhea, nausea and vomiting.   Genitourinary:  Negative for dysuria.   Musculoskeletal:  Negative for back pain.   Skin:  Negative for rash.   Neurological:  Negative for weakness and headaches.   Hematological:  Does not bruise/bleed easily.   All other systems reviewed and are negative.    Physical Exam     Initial Vitals [01/31/23 1559]   BP Pulse Resp Temp SpO2   (!) 114/42 76 20 97.8 °F (36.6 °C) (!) 94 %      MAP       --         Physical Exam    Nursing note and vitals reviewed.  Constitutional: She appears well-developed and well-nourished. She is Obese . No distress.   HENT:   Head: Normocephalic and atraumatic.   Mouth/Throat: No oropharyngeal exudate.   Eyes: Conjunctivae and EOM are normal. Pupils are equal, round, and reactive to light.   Neck: Neck supple. No tracheal deviation present.   Normal range of motion.  Cardiovascular:  Normal rate, regular rhythm, normal heart sounds and intact distal pulses.           No murmur heard.  Pulmonary/Chest: Breath sounds normal. No stridor. No respiratory distress. She has no wheezes. She has no rhonchi. She has no rales.   Abdominal: Abdomen is soft. She exhibits no distension. There is no abdominal tenderness. There is no rebound.   Musculoskeletal:         General: No tenderness or edema. Normal range of motion.      Cervical back: Normal range of motion and neck supple.     Neurological: She is alert and oriented to person, place, and time. She has normal strength. No cranial nerve deficit or sensory deficit. GCS score is 15. GCS eye subscore is 4. GCS verbal subscore is 5. GCS motor subscore is 6.   Skin: Skin is warm and dry. Capillary refill takes less than 2 seconds. No rash noted. No erythema. No pallor.   Psychiatric: She has a normal mood and affect.  Thought content normal.       ED Course   Procedures  Labs Reviewed   CBC W/ AUTO DIFFERENTIAL - Abnormal; Notable for the following components:       Result Value    MCH 31.1 (*)     Immature Grans (Abs) 0.05 (*)     Gran % 77.7 (*)     Lymph % 11.4 (*)     All other components within normal limits   COMPREHENSIVE METABOLIC PANEL - Abnormal; Notable for the following components:    Sodium 134 (*)     Chloride 93 (*)     BUN 24 (*)     All other components within normal limits   B-TYPE NATRIURETIC PEPTIDE - Abnormal; Notable for the following components:     (*)     All other components within normal limits   TROPONIN I HIGH SENSITIVITY   TROPONIN I HIGH SENSITIVITY        ECG Results              EKG 12-lead (In process)  Result time 01/31/23 16:18:33      In process by Interface, Lab In [facility] (01/31/23 16:18:33)                   Narrative:    Test Reason : R07.9,    Vent. Rate : 069 BPM     Atrial Rate : 000 BPM     P-R Int : 000 ms          QRS Dur : 122 ms      QT Int : 412 ms       P-R-T Axes : 000 114 106 degrees     QTc Int : 441 ms    Atrial fibrillation with premature ventricular or aberrantly conducted  complexes  Right axis deviation  Septal infarct ,age undetermined  Abnormal ECG  When compared with ECG of 27-MAR-2022 01:03,  No significant change was found    Referred By: [email or web address]   SELF           Confirmed By:                                   Imaging Results              X-Ray Chest AP Portable (Final result)  Result time 01/31/23 17:32:30      Final result by Monroe Girard MD (01/31/23 17:32:30)                   Narrative:    HISTORY: Chest Pain    FINDINGS: Portable chest radiograph at 1654 hours compared to 08/17/2022 shows stable enlarged cardiac silhouette, with normal pulmonary vascularity and aortic vascular calcifications.    The lungs are normally expanded, with no consolidation, large pleural effusion, or evidence of pulmonary edema. No confluent infiltrates or pneumothorax.  There are no significant osseous abnormalities.    IMPRESSION: Stable cardiomegaly, with no evidence of acute cardiopulmonary disease.    Electronically signed by:  Monroe Girard MD  1/31/2023 5:32 PM CST Workstation: [id number]                                     Medications - No data to display  Medical Decision Making:   Clinical Tests:   Lab Tests: Ordered and Reviewed  Radiological Study: Ordered and Reviewed  Medical Tests: Ordered and Reviewed  ED Management:  72-year-old female presents emergency department with chest pressure as described above.  Patient has already received aspirin and was pain-free upon evaluation emergency department after nitroglycerin.  Patient has a high risk heart score.  Patient will be referred to the hospitalist for admission for stress testing/further evaluation of her chest pressure.  Doubt PE, doubt dissection doubt pneumonia/pneumothorax.  Patient stable and awaiting hospitalist admission.  Labs have been reviewed.  Troponin negative, electrolytes within normal limits.  Not anemic.  EKG with AFib, no obvious STEMI.  Additional MDM:   Heart Score:    History:          Highly suspicious.  ECG:             Normal  Age:               >65 years  Risk factors: >= 3 risk factors or history of atherosclerotic disease  Troponin:       Less than or equal to normal limit  Final Score: 6          ED Course as of 01/31/23 1830   Tue Jan 31, 2023   1618 EKG 4:04 p.m. atrial fibrillation rate of 69, occasional premature ventricular complexes.  Right axis deviation, borderline low voltage.  No STEMI.  EKG interpreted independently by me. [JR]   1828 Dr. Frye will admit the patient.  [JR]      ED Course User Index  [JR] Tim Leslie DO                 Clinical Impression:   Final diagnoses:  [R07.9] Chest pain (Primary)        ED Disposition Condition    Admit Stable                Tim Leslie DO  01/31/23 1829       Tim Leslie DO  01/31/23 1830

## 2023-02-01 ENCOUNTER — HOSPITAL ENCOUNTER (OUTPATIENT)
Dept: RADIOLOGY | Facility: HOSPITAL | Age: 73
Discharge: HOME | End: 2023-02-01
Attending: GENERAL PRACTICE
Payer: MEDICARE

## 2023-02-01 ENCOUNTER — CLINICAL SUPPORT (OUTPATIENT)
Dept: CARDIOLOGY | Facility: HOSPITAL | Age: 73
End: 2023-02-01
Attending: GENERAL PRACTICE
Payer: MEDICARE

## 2023-02-01 ENCOUNTER — CLINICAL SUPPORT (OUTPATIENT)
Dept: CARDIOLOGY | Facility: HOSPITAL | Age: 73
End: 2023-02-01
Attending: INTERNAL MEDICINE
Payer: MEDICARE

## 2023-02-01 VITALS
SYSTOLIC BLOOD PRESSURE: 123 MMHG | TEMPERATURE: 97 F | WEIGHT: 249.19 LBS | OXYGEN SATURATION: 96 % | HEIGHT: 68 IN | DIASTOLIC BLOOD PRESSURE: 48 MMHG | RESPIRATION RATE: 18 BRPM | BODY MASS INDEX: 37.77 KG/M2 | HEART RATE: 54 BPM

## 2023-02-01 VITALS — BODY MASS INDEX: 37.76 KG/M2 | WEIGHT: 249.13 LBS | HEIGHT: 68 IN

## 2023-02-01 LAB
ANION GAP SERPL CALC-SCNC: 9 MMOL/L (ref 8–16)
BASOPHILS # BLD AUTO: 0.05 K/UL (ref 0–0.2)
BASOPHILS NFR BLD: 0.6 % (ref 0–1.9)
BSA FOR ECHO PROCEDURE: 2.33 M2
BUN SERPL-MCNC: 26 MG/DL (ref 8–23)
CALCIUM SERPL-MCNC: 10.2 MG/DL (ref 8.7–10.5)
CHLORIDE SERPL-SCNC: 101 MMOL/L (ref 95–110)
CO2 SERPL-SCNC: 28 MMOL/L (ref 23–29)
CREAT SERPL-MCNC: 0.7 MG/DL (ref 0.5–1.4)
DIFFERENTIAL METHOD: ABNORMAL
EOSINOPHIL # BLD AUTO: 0.1 K/UL (ref 0–0.5)
EOSINOPHIL NFR BLD: 1.6 % (ref 0–8)
ERYTHROCYTE [DISTWIDTH] IN BLOOD BY AUTOMATED COUNT: 14.1 % (ref 11.5–14.5)
EST. GFR  (NO RACE VARIABLE): >60 ML/MIN/1.73 M^2
GLUCOSE SERPL-MCNC: 100 MG/DL (ref 70–110)
GLUCOSE SERPL-MCNC: 83 MG/DL (ref 70–110)
GLUCOSE SERPL-MCNC: 87 MG/DL (ref 70–110)
HCT VFR BLD AUTO: 44.2 % (ref 37–48.5)
HGB BLD-MCNC: 14.1 G/DL (ref 12–16)
IMM GRANULOCYTES # BLD AUTO: 0.02 K/UL (ref 0–0.04)
IMM GRANULOCYTES NFR BLD AUTO: 0.3 % (ref 0–0.5)
LYMPHOCYTES # BLD AUTO: 1.8 K/UL (ref 1–4.8)
LYMPHOCYTES NFR BLD: 22.1 % (ref 18–48)
MAGNESIUM SERPL-MCNC: 2 MG/DL (ref 1.6–2.6)
MCH RBC QN AUTO: 30.6 PG (ref 27–31)
MCHC RBC AUTO-ENTMCNC: 31.9 G/DL (ref 32–36)
MCV RBC AUTO: 96 FL (ref 82–98)
MONOCYTES # BLD AUTO: 1 K/UL (ref 0.3–1)
MONOCYTES NFR BLD: 12 % (ref 4–15)
NEUTROPHILS # BLD AUTO: 5 K/UL (ref 1.8–7.7)
NEUTROPHILS NFR BLD: 63.4 % (ref 38–73)
NRBC BLD-RTO: 0 /100 WBC
PLATELET # BLD AUTO: 224 K/UL (ref 150–450)
PMV BLD AUTO: 10.2 FL (ref 9.2–12.9)
POTASSIUM SERPL-SCNC: 3.6 MMOL/L (ref 3.5–5.1)
RBC # BLD AUTO: 4.61 M/UL (ref 4–5.4)
SODIUM SERPL-SCNC: 138 MMOL/L (ref 136–145)
TROPONIN I SERPL HS-MCNC: 11.5 PG/ML (ref 0–14.9)
TROPONIN I SERPL HS-MCNC: 11.7 PG/ML (ref 0–14.9)
WBC # BLD AUTO: 7.92 K/UL (ref 3.9–12.7)

## 2023-02-01 PROCEDURE — 80048 BASIC METABOLIC PNL TOTAL CA: CPT | Performed by: INTERNAL MEDICINE

## 2023-02-01 PROCEDURE — 93010 ELECTROCARDIOGRAM REPORT: CPT | Mod: ,,, | Performed by: INTERNAL MEDICINE

## 2023-02-01 PROCEDURE — 85025 COMPLETE CBC W/AUTO DIFF WBC: CPT | Performed by: INTERNAL MEDICINE

## 2023-02-01 PROCEDURE — A9502 TC99M TETROFOSMIN: HCPCS | Mod: 52

## 2023-02-01 PROCEDURE — 93306 TTE W/DOPPLER COMPLETE: CPT | Mod: 26,,, | Performed by: GENERAL PRACTICE

## 2023-02-01 PROCEDURE — 83735 ASSAY OF MAGNESIUM: CPT | Performed by: INTERNAL MEDICINE

## 2023-02-01 PROCEDURE — 78451 HT MUSCLE IMAGE SPECT SING: CPT | Mod: 52,TC

## 2023-02-01 PROCEDURE — 94761 N-INVAS EAR/PLS OXIMETRY MLT: CPT

## 2023-02-01 PROCEDURE — 99204 PR OFFICE/OUTPT VISIT, NEW, LEVL IV, 45-59 MIN: ICD-10-PCS | Mod: 25,,, | Performed by: GENERAL PRACTICE

## 2023-02-01 PROCEDURE — 36415 COLL VENOUS BLD VENIPUNCTURE: CPT | Performed by: INTERNAL MEDICINE

## 2023-02-01 PROCEDURE — 93005 ELECTROCARDIOGRAM TRACING: CPT | Performed by: INTERNAL MEDICINE

## 2023-02-01 PROCEDURE — 25000003 PHARM REV CODE 250: Performed by: INTERNAL MEDICINE

## 2023-02-01 PROCEDURE — 93306 TTE W/DOPPLER COMPLETE: CPT

## 2023-02-01 PROCEDURE — 99900035 HC TECH TIME PER 15 MIN (STAT)

## 2023-02-01 PROCEDURE — 93306 ECHO (CUPID ONLY): ICD-10-PCS | Mod: 26,,, | Performed by: GENERAL PRACTICE

## 2023-02-01 PROCEDURE — 84484 ASSAY OF TROPONIN QUANT: CPT | Performed by: INTERNAL MEDICINE

## 2023-02-01 PROCEDURE — 93010 EKG 12-LEAD: ICD-10-PCS | Mod: ,,, | Performed by: INTERNAL MEDICINE

## 2023-02-01 PROCEDURE — 99204 OFFICE O/P NEW MOD 45 MIN: CPT | Mod: 25,,, | Performed by: GENERAL PRACTICE

## 2023-02-01 PROCEDURE — G0378 HOSPITAL OBSERVATION PER HR: HCPCS

## 2023-02-01 RX ORDER — REGADENOSON 0.08 MG/ML
0.4 INJECTION, SOLUTION INTRAVENOUS
Status: DISCONTINUED | OUTPATIENT
Start: 2023-02-01 | End: 2023-02-02 | Stop reason: HOSPADM

## 2023-02-01 RX ORDER — LORAZEPAM 0.5 MG/1
0.5 TABLET ORAL EVERY 6 HOURS PRN
Status: DISCONTINUED | OUTPATIENT
Start: 2023-02-01 | End: 2023-02-01 | Stop reason: HOSPADM

## 2023-02-01 RX ADMIN — ACETAMINOPHEN 650 MG: 325 TABLET ORAL at 01:02

## 2023-02-01 RX ADMIN — ACETAMINOPHEN 650 MG: 325 TABLET ORAL at 12:02

## 2023-02-01 RX ADMIN — GABAPENTIN 600 MG: 300 CAPSULE ORAL at 12:02

## 2023-02-01 RX ADMIN — LORAZEPAM 0.5 MG: 0.5 TABLET ORAL at 01:02

## 2023-02-01 RX ADMIN — SPIRONOLACTONE 50 MG: 25 TABLET ORAL at 12:02

## 2023-02-01 RX ADMIN — GABAPENTIN 600 MG: 300 CAPSULE ORAL at 04:02

## 2023-02-01 RX ADMIN — POTASSIUM CHLORIDE 20 MEQ: 1500 TABLET, EXTENDED RELEASE ORAL at 12:02

## 2023-02-01 NOTE — PROGRESS NOTES
Automatic Inhaler to Nebulizer Interchange    albuterol (Ventolin, ProAir, or Proventil)  mcg given multiple times per day changed to albuterol 2.5 mg q6h prn Wheezing or SOB  per [facility] Automatic Therapeutic Substitutions Protocol.    Please contact pharmacy at extension [phone] with any questions.     Thank you,   Maye Valle

## 2023-02-01 NOTE — PLAN OF CARE
02/01/23 0903   Patient Assessment/Suction   Level of Consciousness (AVPU) alert   Respiratory Effort Unlabored   All Lung Fields Breath Sounds clear;diminished   PRE-TX-O2   Device (Oxygen Therapy) room air   SpO2 95 %   Pulse Oximetry Type Intermittent   $ Pulse Oximetry - Multiple Charge Pulse Oximetry - Multiple   Aerosol Therapy   $ Aerosol Therapy Charges PRN treatment not required   Respiratory Evaluation   $ Care Plan Tech Time 15 min

## 2023-02-01 NOTE — PLAN OF CARE
02/01/23 1604   Final Note   Assessment Type Final Discharge Note   Anticipated Discharge Disposition Home   What phone number can be called within the next 1-3 days to see how you are doing after discharge? [phone]   Hospital Resources/Appts/Education Provided Appointments scheduled and added to AVS;Provided patient/caregiver with written discharge plan information   Post-Acute Status   Discharge Delays None known at this time     Discharge orders and chart reviewed. No other discharge needs noted at this time. Pt is clear for discharge from case management. Pt is discharging to home.    PCP follow up scheduled and added to patient's AVS

## 2023-02-01 NOTE — CONSULTS
[facility]  Department of Cardiology  Consult Note      PATIENT NAME: Nelly Tian  MRN: [id number]  TODAY'S DATE: 02/01/2023  ADMIT DATE: 1/31/2023                          CONSULT REQUESTED BY: Raine Tabares MD    SUBJECTIVE     PRINCIPAL PROBLEM: Chest pain      REASON FOR CONSULT:     Chest pain      HPI:    Patient is a 72-year-old female with past medical history of diabetes mellitus, hypertension, CHF, COPD, history of MI, hyperlipidemia, PVD, atrial fibrillation/flutter on Eliquis, morbid obesity, former smoker who now vapes, who presented to ED with chest pain on 01/31.  Chest pain started about 1:30 p.m. in the afternoon.  She describes it as a heaviness in her left chest that radiated down to her left arm with some tingling.  EMS gave her 324 mg aspirin and nitroglycerin which brought her blood pressure from systolic BP 1 80s to 115.  Her pain also decreased from a 5/10 to 1/10.  She was pain-free in ED. She denies shortness of breath, diaphoresis, nausea vomiting, palpitations, focal deficits, dizziness, lightheadedness, bleeding or edema.  She denies having any recent stress test and does not have a cardiologist.  She states that she does not have any stents in her heart either.      1/31:  H&H 13.6/41.2, WBC 9.97, K 3.9, Cr 1.0, Mag 2.0, .  Troponins high sensitivity negative. EKG 1/31/23: Atrial fibrillation with premature ventricular or aberrantly conducted   complexes .Right axis deviation   Septal infarct ,age undetermined       ECHO 3/28/22  The left ventricle is severely enlarged with eccentric hypertrophy and severely decreased systolic function.  The estimated ejection fraction is 25%.  Moderate-to-severe mitral regurgitation.  Mild right atrial enlargement.  The estimated PA systolic pressure is 34 mmHg.  Moderate left atrial enlargement.  Mild pulmonic regurgitation.  Mild tricuspid regurgitation.  Normal right ventricular size with moderately reduced right  ventricular systolic function.  Atrial fibrillation observed with restrictive filling pattern present.  Unchanged from prior       Review of patient's allergies indicates:   Allergen Reactions    Dilaudid [hydromorphone] Anaphylaxis     Other reaction(s): Anaphylaxis  Other reaction(s): Unknown    Zyvox [linezolid in dextrose 5%] Shortness Of Breath       Past Medical History:   Diagnosis Date    *Atrial flutter     Angina pectoris 2017    Anxiety     Arthritis     Asthma     Atrial fibrillation     Back pain     Cataract     OD    CHF (congestive heart failure)     Chronically on benzodiazepine therapy 2019    COPD (chronic obstructive pulmonary disease)     Depression     Diabetes mellitus     Emphysema of lung     Heart failure     Hepatomegaly 2/3/2016    Hernia     History of MI (myocardial infarction) 2016    Hypercapnic respiratory failure, chronic 2016    Hyperlipidemia     Hypertension     Iron deficiency anemia 2/3/2016    Myocardial infarction     Obesity     Peripheral vascular disease     Pneumonia     Polyneuropathy     Retinal detachment     OS    Septic shock 2017    Skin ulcer 3/18/2017    Tobacco dependence     Type II 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, not stated as uncontrolled(250.60)      Past Surgical History:   Procedure Laterality Date    BREAST BIOPSY Left 10 yrs ago    benign    CATARACT EXTRACTION Left     OS      SECTION      x2    EYE SURGERY      HYSTERECTOMY      partial    OOPHORECTOMY      one ovary conserved    RETINAL DETACHMENT SURGERY      buckle --OS    TONSILLECTOMY       Social History     Tobacco Use    Smoking status: Former     Packs/day: 0.30     Years: 50.00     Pack years: 15.00     Types: Cigarettes     Start date: 1968     Quit date: 2022     Years since quittin.5    Smokeless tobacco: Never   Substance Use Topics    Alcohol use: No     Alcohol/week: 0.0 standard drinks    Drug use: No         REVIEW OF SYSTEMS  CONSTITUTIONAL: Negative for chills, fatigue and fever.   EYES: No double vision, No blurred vision  NEURO: No headaches, No dizziness  RESPIRATORY: Negative for cough, shortness of breath and wheezing.    CARDIOVASCULAR: Negative for chest pain. Negative for palpitations and leg swelling.   GI: Negative for abdominal pain, No melena, diarrhea, nausea and vomiting.   : Negative for dysuria and frequency, Negative for hematuria  SKIN: Negative for bruising, Negative for edema or discoloration noted.   ENDOCRINE: Negative for polyphagia, Negative for heat intolerance, Negative for cold intolerance  PSYCHIATRIC: Negative for depression, Negative for anxiety, Negative for memory loss  MUSCULOSKELETAL: Negative for neck pain, Negative for muscle weakness, Negative for back pain     OBJECTIVE     VITAL SIGNS (Most Recent)  Temp: 98 °F (36.7 °C) (02/01/23 0701)  Pulse: (!) 50 (02/01/23 0701)  Resp: 19 (02/01/23 0701)  BP: (!) 117/53 (02/01/23 0701)  SpO2: (!) 94 % (02/01/23 0701)    VENTILATION STATUS  Resp: 19 (02/01/23 0701)  SpO2: (!) 94 % (02/01/23 0701)       I & O (Last 24H):No intake or output data in the 24 hours ending 02/01/23 0922    WEIGHTS  Wt Readings from Last 3 Encounters:   01/31/23 2206 113 kg (249 lb 3.2 oz)   01/31/23 1559 113.9 kg (251 lb)   12/05/22 1256 109.6 kg (241 lb 10 oz)   11/28/22 1456 103.4 kg (228 lb)       PHYSICAL EXAM  GENERAL: well built, well nourished, well-developed in no apparent distress alert and oriented.   HEENT: Normocephalic. Pupils normal and conjunctivae normal.  Mucous membranes normal, no cyanosis or icterus, trachea central,no pallor or icterus is noted..   NECK: No JVD. No bruit..   THYROID: Thyroid not enlarged. No nodules present..   CARDIAC: Systolic murmur  CHEST ANATOMY: normal.   LUNGS: Clear to auscultation. No wheezing or rhonchi..   ABDOMEN: Pendulous abdomen to side of her body  URINARY: No ortiz catheter   EXTREMITIES: No cyanosis,  clubbing or edema noted at this time., no calf tenderness bilaterally.   PERIPHERAL VASCULAR SYSTEM: Good palpable distal pulses.   CENTRAL NERVOUS SYSTEM: No focal motor or sensory deficits noted.   SKIN: Skin without lesions, moist, well perfused.   MUSCLE STRENGTH & TONE: No noteable weakness, atrophy or abnormal movement.     HOME MEDICATIONS:  No current facility-administered medications on file prior to encounter.     Current Outpatient Medications on File Prior to Encounter   Medication Sig Dispense Refill    albuterol (PROVENTIL/VENTOLIN HFA) 90 mcg/actuation inhaler INHALE 2 PUFFS EVERY 6 HOURS AS NEEDED FOR WHEEZING  (RESCUE) (Patient taking differently: Inhale 2 puffs into the lungs every 6 (six) hours as needed.) 54 g 3    apixaban (ELIQUIS) 5 mg Tab Take 1 tablet (5 mg total) by mouth 2 (two) times daily. 180 tablet 0    ascorbic acid, vitamin C, (VITAMIN C) 500 MG tablet Take 2 tablets (1,000 mg total) by mouth every evening.      atorvastatin (LIPITOR) 10 MG tablet TAKE 1 TABLET(10 MG) BY MOUTH EVERY OTHER DAY (Patient taking differently: Take 10 mg by mouth every other day.) 35 tablet 3    empagliflozin (JARDIANCE) 10 mg tablet Take 1 tablet (10 mg total) by mouth once daily. 90 tablet 3    fluticasone propionate (FLONASE) 50 mcg/actuation nasal spray SHAKE LIQUID AND USE 1 SPRAY(50 MCG) IN EACH NOSTRIL EVERY DAY 48 g 2    fluticasone-umeclidin-vilanter (TRELEGY ELLIPTA) 100-62.5-25 mcg DsDv INHALE 1 PUFF EVERY DAY EXPIRES 42 DAYS AFTER OPENING FOIL TRAY (Patient taking differently: Inhale 1 puff into the lungs once daily.) 60 each 3    furosemide (LASIX) 40 MG tablet TAKE 1 TABLET(40 MG) BY MOUTH EVERY DAY (Patient taking differently: Take 40 mg by mouth once daily.) 90 tablet 3    gabapentin (NEURONTIN) 800 MG tablet Take 1 tablet (800 mg total) by mouth 3 (three) times daily. 90 tablet 11    [START ON 2/10/2023] HYDROcodone-acetaminophen (NORCO) 7.5-325 mg per tablet Take 1 tablet by mouth every  12 (twelve) hours as needed for Pain. Medical necessary for greater than 7 days for chronic pain. 60 tablet 0    LIDOcaine (LIDODERM) 5 % APPLY PATCH ONTO SKIN.REMOVE AND DISCARD PATCH WITHIN 12 HOURS OR AS DIRECTED BY MD 30 patch 3    melatonin 10 mg Tab Take 10 mg by mouth nightly.      metFORMIN (GLUCOPHAGE) 500 MG tablet Take 1 tablet (500 mg total) by mouth daily with breakfast. 90 tablet 0    multivitamin (THERAGRAN) tablet Take 1 tablet by mouth once daily.      nystatin (NYSTOP) powder APPLY TOPICALLY TWICE DAILY (Patient taking differently: Apply 1 g topically 2 (two) times daily. APPLY TOPICALLY TWICE DAILY) 120 g 11    nystatin-triamcinolone (MYCOLOG II) cream Apply topically 3 (three) times daily. to affected area (Patient taking differently: Apply 1 g topically 3 (three) times daily. to affected area) 30 g 3    omeprazole (PRILOSEC) 20 MG capsule Take 20 mg by mouth once daily.      ondansetron (ZOFRAN-ODT) 8 MG TbDL Take 1 tablet (8 mg total) by mouth every 12 (twelve) hours as needed. 40 tablet 3    potassium chloride SA (K-DUR,KLOR-CON) 20 MEQ tablet Take 20 mEq by mouth once daily.      spironolactone (ALDACTONE) 50 MG tablet Take 1 tablet (50 mg total) by mouth once daily. 30 tablet 11    blood sugar diagnostic Strp To check BG 2 times daily, to use with insurance preferred meter 100 each 5    blood-glucose meter kit To check BG 2 times daily, to use with insurance preferred meter 1 each 1    lancets Misc To check BG 2 times daily, to use with insurance preferred meter 100 each 5    lisinopriL (PRINIVIL,ZESTRIL) 20 MG tablet Take 20 mg by mouth 2 (two) times a day.      traZODone (DESYREL) 50 MG tablet Take 1.5 tablets (75 mg total) by mouth nightly as needed for Insomnia (insomnia). 135 tablet 3       SCHEDULED MEDS:   acetaminophen  1,000 mg Intravenous Q8H    apixaban  5 mg Oral BID    atorvastatin  10 mg Oral Every other day    fluticasone propionate  2 spray Each Nostril Daily    furosemide   40 mg Oral Daily    gabapentin  600 mg Oral TID    lisinopriL  20 mg Oral BID    pantoprazole  40 mg Oral Daily    potassium chloride SA  20 mEq Oral Daily    spironolactone  50 mg Oral Daily       CONTINUOUS INFUSIONS:    PRN MEDS:acetaminophen, albuterol sulfate, melatonin, ondansetron, prochlorperazine, sodium chloride 0.9%    LABS AND DIAGNOSTICS     CBC LAST 3 DAYS  Recent Labs   Lab 01/31/23 1653 02/01/23  0434   WBC 9.97 7.92   RBC 4.38 4.61   HGB 13.6 14.1   HCT 41.2 44.2   MCV 94 96   MCH 31.1* 30.6   MCHC 33.0 31.9*   RDW 13.8 14.1    224   MPV 9.8 10.2   GRAN 77.7*  7.7 63.4  5.0   LYMPH 11.4*  1.1 22.1  1.8   MONO 9.0  0.9 12.0  1.0   BASO 0.07 0.05   NRBC 0 0       COAGULATION LAST 3 DAYS  No results for input(s): LABPT, INR, APTT in the last 168 hours.    CHEMISTRY LAST 3 DAYS  Recent Labs   Lab 01/31/23 1653 02/01/23  0434   * 138   K 3.9 3.6   CL 93* 101   CO2 29 28   ANIONGAP 12 9   BUN 24* 26*   CREATININE 1.0 0.7    87   CALCIUM 10.1 10.2   MG  --  2.0   ALBUMIN 3.9  --    PROT 7.2  --    ALKPHOS 59  --    ALT 24  --    AST 31  --    BILITOT 0.8  --        CARDIAC PROFILE LAST 3 DAYS  Recent Labs   Lab 01/31/23 1653 01/31/23  1914 01/31/23  2318   *  --   --    TROPONINIHS 10.5 13.2 11.7       ENDOCRINE LAST 3 DAYS  No results for input(s): TSH, PROCAL in the last 168 hours.    LAST ARTERIAL BLOOD GAS  ABG  No results for input(s): PH, PO2, PCO2, HCO3, BE in the last 168 hours.    LAST 7 DAYS MICROBIOLOGY   Microbiology Results (last 7 days)       ** No results found for the last 168 hours. **            MOST RECENT IMAGING  X-Ray Chest AP Portable  HISTORY: Chest Pain    FINDINGS: Portable chest radiograph at 1654 hours compared to 08/17/2022 shows stable enlarged cardiac silhouette, with normal pulmonary vascularity and aortic vascular calcifications.    The lungs are normally expanded, with no consolidation, large pleural effusion, or evidence of pulmonary  edema. No confluent infiltrates or pneumothorax. There are no significant osseous abnormalities.    IMPRESSION: Stable cardiomegaly, with no evidence of acute cardiopulmonary disease.    Electronically signed by:  Monroe Girard MD  1/31/2023 5:32 PM [facility] Workstation: [id number]      ECHOCARDIOGRAM RESULTS (last 5)  Results for orders placed during the hospital encounter of 03/27/22    Echo    Interpretation Summary  · The left ventricle is severely enlarged with eccentric hypertrophy and severely decreased systolic function.  · The estimated ejection fraction is 25%.  · Moderate-to-severe mitral regurgitation.  · Mild right atrial enlargement.  · The estimated PA systolic pressure is 34 mmHg.  · Moderate left atrial enlargement.  · Mild pulmonic regurgitation.  · Mild tricuspid regurgitation.  · Normal right ventricular size with moderately reduced right ventricular systolic function.  · Atrial fibrillation observed with restrictive filling pattern present.  · Unchanged from prior      Results for orders placed during the hospital encounter of 12/14/19    Echo Color Flow Doppler? Yes    Interpretation Summary  · Eccentric left ventricular hypertrophy.  · The mean diastolic gradient across the mitral valve is 2 mmHg at a heart rate of bpm.  · Moderate left ventricular enlargement.  · Moderately decreased left ventricular systolic function. The estimated ejection fraction is 35%  · Grade III (severe) left ventricular diastolic dysfunction consistent with restrictive physiology.  · Moderate right ventricular enlargement.  · Low normal right ventricular systolic function.  · Moderate left atrial enlargement.  · Moderate right atrial enlargement.  · Moderate-to-severe mitral regurgitation.  · Moderate to severe tricuspid regurgitation.  · Moderate pulmonary hypertension present.  · Moderate pulmonic regurgitation.  · Elevated central venous pressure (15 mm Hg).  · The estimated PA systolic pressure is 57 mm Hg    1) atrial  " Fib  Dilated LV   EF 35%  2) moderate severe MR  3) moderate pulmonary hi bp      CURRENT/PREVIOUS VISIT EKG  Results for orders placed or performed during the hospital encounter of 01/31/23   EKG 12-lead    Collection Time: 01/31/23  4:04 PM    Narrative    Test Reason : R07.9,    Vent. Rate : 069 BPM     Atrial Rate : 000 BPM     P-R Int : 000 ms          QRS Dur : 122 ms      QT Int : 412 ms       P-R-T Axes : 000 114 106 degrees     QTc Int : 441 ms    Atrial fibrillation with premature ventricular or aberrantly conducted  complexes  Right axis deviation  Septal infarct ,age undetermined  Abnormal ECG  When compared with ECG of 27-MAR-2022 01:03,  No significant change was found    Referred By: [email or web address]   SELF           Confirmed By:            ASSESSMENT/PLAN:     Active Hospital Problems    Diagnosis    *Chest pain       ASSESSMENT & PLAN:   Chest Pain  Hypertension  Chronic Systolic heart Failure  Atrial Fibrillation.       RECOMMENDATIONS:    ECHO pending. Further recommendations to follow.  Previous ECHO 3/22 showed EF 25% with mod-severe MR and atrial fibrillation. Patient is not on any antiarrhythmic or beta blocker at this time.   EKG is unchanged does have some SVR  Heart healthy diet.  Low sodium intake, 2g.  Strict I's & O's.    Patient has chronic atrial fibrillation. Continue Eliquis 5mg BID. Monitor for bleeding. Serial CBCs.  Continue lasix 40 mg daily, lisinopril 20 mg daily, and spironolactone 50 mg daily. Hold antihypertensives for SBP <100.   Patient eager to go home and does not " fit" on nuclear tables.   Troponin negative plan for OP Dobutamine ECHO    Ca Gandhi NP  [facility]  Department of Cardiology  Date of Service: 02/01/2023    Echo today was not diagnostic for LV function.  The patient has episode of chest pressure at rest which was relieved spontaneously and she attributes to anxiety and stress.  There was no EKG changes.  She does have atrial fibrillation " and heart rate in the 40s.  We attempted stress test today and it was not able to be completed since she is ruled out for acute coronary syndrome and low risk she can be discharged home and follow-up in the office to consider further evaluation.

## 2023-02-01 NOTE — PLAN OF CARE
02/01/23 1420   GARCIA Message   Medicare Outpatient and Observation Notification regarding financial responsibility Explained to patient/caregiver   Date GARCIA was signed 02/01/23   Time GARCIA was signed 1425     GARCIA completed by phone with daughter

## 2023-02-01 NOTE — NURSING
Patient here to floor from ED per cart and ER staff. Patient admitted with diagnosis of chest pain. Patient is a standby assist, denies any chest pain, alert and oriented. Telemetry in place. Patient has a hernia noted to the left side of her abdomen. Patient states her chest pain could have been something  to do with  her anxiety level from problems at home. Patient on room air, but does wear 2lpm NC at night per patient report. Patient oriented to floor, call light and plan of care for this shift.    Patient will be NPO after midnight for a cardiac workup.

## 2023-02-01 NOTE — NURSING
MD notified of HR dropping to 34, but going back up to 40's-50's. Patient asymptomatic. No new orders received.

## 2023-02-01 NOTE — DISCHARGE SUMMARY
[facility]  Discharge Summary  Patient Name: Nelly Tian MRN: [id number]   Patient Class: OP- Observation  Length of Stay: 0   Admission Date: 1/31/2023  4:02 PM Attending Physician: Raine Tabares MD   Primary Care Provider: Constantine Bird MD Face-to-Face encounter date: 02/01/2023   Chief Complaint: Chest Pain (Chest pain since 10 am radiating down the left arm and into back. EMS gave 0.4 sublingual nitro and 324 ASA. )    Date of Discharge: 2/1/2023  Discharge Disposition:Home or Self Care    Condition: Stable       Reason for Hospitalization     Active Hospital Problems    Diagnosis    *Chest pain         Brief History of Present Illness    Nelly Tian is a 72 y.o.  female who  has a past medical history of *Atrial flutter, Angina pectoris (9/18/2017), Anxiety, Arthritis, Asthma, Atrial fibrillation, Back pain, Cataract, CHF (congestive heart failure), Chronically on benzodiazepine therapy (5/4/2019), COPD (chronic obstructive pulmonary disease), Depression, Diabetes mellitus, Emphysema of lung, Heart failure, Hepatomegaly (2/3/2016), Hernia, History of MI (myocardial infarction) (1/19/2016), Hypercapnic respiratory failure, chronic (11/16/2016), Hyperlipidemia, Hypertension, Iron deficiency anemia (2/3/2016), Myocardial infarction, Obesity, Peripheral vascular disease, Pneumonia, Polyneuropathy, Retinal detachment, Septic shock (4/23/2017), Skin ulcer (3/18/2017), Tobacco dependence, and Type II 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, not stated as uncontrolled(250.60).. The patient presented to [facility] on 1/31/2023 with a primary complaint of Chest Pain (Chest pain since 10 am radiating down the left arm and into back. EMS gave 0.4 sublingual nitro and 324 ASA. )  .     For the full HPI please refer to the History & Physical from this admission.    Hospital Course By Problem with Pertinent Findings     Admitted for chest  "pain. Stress test ordered by cardiology however patient not able to have stress test due to body habitus. Patient discharged home with outpatient follow up for stress echo.     Patient was seen and examined on the date of discharge and determined to be suitable for discharge.    Physical Exam  BP (!) 136/55 (BP Location: Right arm, Patient Position: Sitting)   Pulse (!) 58   Temp 97.8 °F (36.6 °C) (Oral)   Resp 18   Ht 5' 8" (1.727 m)   Wt 113 kg (249 lb 3.2 oz)   LMP  (LMP Unknown)   SpO2 96%   BMI 37.89 kg/m²   Vitals reviewed.    Constitutional: No distress.   HENT: Atraumatic.   Cardiovascular: Normal rate, regular rhythm and normal heart sounds.   Pulmonary/Chest: Effort normal. Clear to auscultation bilaterally. No wheezes.   Abdominal: Soft. Bowel sounds are normal. Exhibits no distension and no mass. No tenderness  Neurological: Alert.   Skin: Skin is warm and dry.     Following labs were Reviewed   Recent Labs   Lab 01/31/23  1653 02/01/23  0434   WBC 9.97 7.92   HGB 13.6 14.1   HCT 41.2 44.2    224   CALCIUM 10.1 10.2   ALBUMIN 3.9  --    PROT 7.2  --    * 138   K 3.9 3.6   CO2 29 28   CL 93* 101   BUN 24* 26*   CREATININE 1.0 0.7   ALKPHOS 59  --    ALT 24  --    AST 31  --    BILITOT 0.8  --      No results found for: POCTGLUCOSE     All labs within the past 24 hours have been reviewed    Microbiology Results (last 7 days)       ** No results found for the last 168 hours. **          NM Myocardial Perfusion Spect Single   Final Result      X-Ray Chest AP Portable   Final Result          No results found. However, due to the size of the patient record, not all encounters were searched. Please check Results Review for a complete set of results.      Consultants and Procedures   Consultants:  Consults (From admission, onward)          Status Ordering Provider     Inpatient consult to Cardiology  Once        Provider:  Prakash Swan MD    Completed KENYATTA JUAN     Inpatient " consult to Hospitalist  Once        Provider:  Kenyatta Eason MD    Acknowledged KENYATTA EASON            Procedures:   * No surgery found *     Discharge Information:   Diet:  Resume cardiac diet    Physical Activity:  Activity as tolerated    Instructions:  1. Take all medications as prescribed  2. Keep all follow-up appointments  3. Return to the hospital or call your primary care physicians if any worsening symptoms such as chest pain, shortness of breath occur.    Follow-Up Appointments:  Please call your primary care physician to schedule an appointment in 1 week time.     Follow-up Information       Constantine Bird MD. Go on 2/9/2023.    Specialty: Family Medicine  Why: hospital follow up appointment scheduled Feb. 9th at 1020AM  Contact information:  [address]  [place] [address] [phone]               Prakash Swan MD Follow up in 1 week(s).    Specialties: Cardiovascular Disease, Cardiology  Contact information:  [address]  [address]  [place] [address]  [phone]                               Pending laboratory work/Tests to be performed/followed by the Primary care Physician: None    The patient was discharged in the care of her parents//wife/family/caregiver, with discharge instructions were reviewed in written and verbal form. All pertinent questions were discussed and prescriptions were provided. The importance of making follow up appointments and compliance of medications has been stressed repeatedly. The patient will follow up in 1 week or sooner as needed with the PCP, and the patient is on board with the plan. Upon discharge, patient needs to be on following medications.    Discharge Medications:     Medication List        CHANGE how you take these medications      albuterol 90 mcg/actuation inhaler  Commonly known as: PROVENTIL/VENTOLIN HFA  INHALE 2 PUFFS EVERY 6 HOURS AS NEEDED FOR WHEEZING  (RESCUE)  What changed: See the new instructions.     atorvastatin 10 MG  tablet  Commonly known as: LIPITOR  TAKE 1 TABLET(10 MG) BY MOUTH EVERY OTHER DAY  What changed: See the new instructions.     furosemide 40 MG tablet  Commonly known as: LASIX  TAKE 1 TABLET(40 MG) BY MOUTH EVERY DAY  What changed: when to take this     nystatin powder  Commonly known as: NYSTOP  APPLY TOPICALLY TWICE DAILY  What changed:   how much to take  how to take this  when to take this     nystatin-triamcinolone cream  Commonly known as: MYCOLOG II  Apply topically 3 (three) times daily. to affected area  What changed: how much to take     TRELEGY ELLIPTA 100-62.5-25 mcg Dsdv  Generic drug: fluticasone-umeclidin-vilanter  INHALE 1 PUFF EVERY DAY EXPIRES 42 DAYS AFTER OPENING FOIL TRAY  What changed: See the new instructions.            CONTINUE taking these medications      apixaban 5 mg Tab  Commonly known as: ELIQUIS  Take 1 tablet (5 mg total) by mouth 2 (two) times daily.     ascorbic acid (vitamin C) 500 MG tablet  Commonly known as: VITAMIN C  Take 2 tablets (1,000 mg total) by mouth every evening.     blood sugar diagnostic Strp  To check BG 2 times daily, to use with insurance preferred meter     blood-glucose meter kit  To check BG 2 times daily, to use with insurance preferred meter     empagliflozin 10 mg tablet  Commonly known as: JARDIANCE  Take 1 tablet (10 mg total) by mouth once daily.     fluticasone propionate 50 mcg/actuation nasal spray  Commonly known as: FLONASE  SHAKE LIQUID AND USE 1 SPRAY(50 MCG) IN EACH NOSTRIL EVERY DAY     gabapentin 800 MG tablet  Commonly known as: NEURONTIN  Take 1 tablet (800 mg total) by mouth 3 (three) times daily.     HYDROcodone-acetaminophen 7.5-325 mg per tablet  Commonly known as: NORCO  Take 1 tablet by mouth every 12 (twelve) hours as needed for Pain. Medical necessary for greater than 7 days for chronic pain.  Start taking on: February 10, 2023     lancets [facility]  To check BG 2 times daily, to use with insurance preferred meter     LIDOcaine 5  %  Commonly known as: LIDODERM  APPLY PATCH ONTO SKIN.REMOVE AND DISCARD PATCH WITHIN 12 HOURS OR AS DIRECTED BY MD     lisinopriL 20 MG tablet  Commonly known as: PRINIVIL,ZESTRIL     melatonin 10 mg Tab     metFORMIN 500 MG tablet  Commonly known as: GLUCOPHAGE  Take 1 tablet (500 mg total) by mouth daily with breakfast.     multivitamin tablet  Commonly known as: THERAGRAN  Take 1 tablet by mouth once daily.     omeprazole 20 MG capsule  Commonly known as: PRILOSEC     ondansetron 8 MG Tbdl  Commonly known as: ZOFRAN-ODT  Take 1 tablet (8 mg total) by mouth every 12 (twelve) hours as needed.     potassium chloride SA 20 MEQ tablet  Commonly known as: K-DUR,KLOR-CON     spironolactone 50 MG tablet  Commonly known as: ALDACTONE  Take 1 tablet (50 mg total) by mouth once daily.     traZODone 50 MG tablet  Commonly known as: DESYREL  Take 1.5 tablets (75 mg total) by mouth nightly as needed for Insomnia (insomnia).                I spent 30 minutes preparing the discharge including reviewing records from previous encounters, preparation of discharge summary, assessing and final examination of the patient, discharge medicine reconciliation, discussing plan of care, follow up and education and prescriptions.       Raine Tabares  [facility] Hospitalist  02/01/2023

## 2023-02-01 NOTE — PLAN OF CARE
[facility]  Initial Discharge Assessment       Primary Care Provider: Constantine Bird MD    Admission Diagnosis: Chest pain [R07.9]    Admission Date: 1/31/2023  Expected Discharge Date: 2/1/2023    Discharge Barriers Identified: None    Discharge assessment completed by phone with Maye (daughter) [phone] due to patient being seen by medical team. Information verified as correct on facesheet. Patient daughter denies HD (dialysis) and coumadin. Patient daughter stated patient has Home O2, however does not know the company used. Patient also utilizes a Rolling walker, cane, and nebulizer. Daughter stated she will ask RN/MD about a PCA to assist patient at home. CM to continue following for case management needs.      Payor: [employer] MEDICARE / Plan: [employer] MEDICARE PPO / Product Type: Medicare Advantage /     Extended Emergency Contact Information  Primary Emergency Contact: Maye Tirado  Address: [address] [place] [address] [address]  Home Phone: [phone]  Work Phone: [phone]  Mobile Phone: [phone]  Relation: Daughter  Preferred language: English   needed? No  Secondary Emergency Contact: Sharda Finn  Mobile Phone: [phone]  Relation: Friend  Preferred language: English   needed? No    Discharge Plan A: Home, Home with family  Discharge Plan B: Home with family, Home Health      [id number] - [place] - [address] AT [facility] [address] [facility]  [address] [address]  Phone: [phone] Fax: [phone]    [facility] - [place], [place] - [address]  [address] [address]  Phone: [phone] Fax: [phone]    [id number] - [place] - [address] [facility] [address]  [address] [address]  Phone: [phone] Fax: [phone]      Initial Assessment (most recent)        Adult Discharge Assessment - 02/01/23 1409          Discharge Assessment    Communicated KRISTOPHER with patient/caregiver Date not available/Unable to determine     Reason For Admission chest pain     Facility Arrived From: home     Do you expect to return to your current living situation? Yes     Do you have help at home or someone to help you manage your care at home? Yes     Who are your caregiver(s) and their phone number(s)? Maye Tirado (daughter) [phone]     Prior to hospitilization cognitive status: Alert/Oriented     Current cognitive status: Alert/Oriented     Walking or Climbing Stairs ambulation difficulty, requires equipment;stair climbing difficulty, requires equipment;transferring difficulty, requires equipment     Mobility Management rolling walker, cane,     Dressing/Bathing bathing difficulty, requires equipment     Dressing/Bathing Management bedside commode     Equipment Currently Used at Home walker, rolling;cane, straight;oxygen;bedside commode;nebulizer     Readmission within 30 days? No     Patient currently being followed by outpatient case management? No     Do you currently have service(s) that help you manage your care at home? No   Daughter wants a PCA    Do you take prescription medications? Yes     Do you have prescription coverage? Yes     Coverage [employer] Managed medicare     Do you have any problems affording any of your prescribed medications? No     Is the patient taking medications as prescribed? yes     Who is going to help you get home at discharge? daughter     How do you get to doctors appointments? family or friend will provide     Are you on dialysis? No     Do you take coumadin? No     Discharge Plan A Home;Home with family     Discharge Plan B Home with family;Home Health     DME Needed Upon Discharge  none     Discharge Plan discussed with: Adult children     Discharge Barriers Identified None

## 2023-02-01 NOTE — RESPIRATORY THERAPY
01/31/23 2235   Patient Assessment/Suction   Level of Consciousness (AVPU) alert   Respiratory Effort Unlabored   Expansion/Accessory Muscles/Retractions no use of accessory muscles   All Lung Fields Breath Sounds diminished   PRE-TX-O2   Device (Oxygen Therapy) room air   SpO2 (!) 93 %   Pulse Oximetry Type Intermittent   $ Pulse Oximetry - Multiple Charge Pulse Oximetry - Multiple   Aerosol Therapy   $ Aerosol Therapy Charges PRN treatment not required   Education   $ Education Bronchodilator;DME Oxygen;15 min   Respiratory Evaluation   $ Care Plan Tech Time 15 min

## 2023-02-01 NOTE — ASSESSMENT & PLAN NOTE
No chest pain at present  Observe telemetry  NPO past midnight  Echocardiogram  Serial troponin's  Cardiology consulted

## 2023-02-01 NOTE — H&P
[facility] - Emergency Dept  Hospital Medicine  History & Physical    Patient Name: Nelly Tian  MRN: [id number]  Patient Class: OP- Observation  Admission Date: 1/31/2023  Attending Physician: Chu Eason MD  Primary Care Provider: Constantine Bird MD         Patient information was obtained from patient, relative(s), past medical records and ER records.     Subjective:     Principal Problem:Chest pain    Chief Complaint:   Chief Complaint   Patient presents with    Chest Pain     Chest pain since 10 am radiating down the left arm and into back. EMS gave 0.4 sublingual nitro and 324 ASA.         HPI: ED note  72-year-old female with past medical history of diabetes, hypertension, atrial fibrillation on Eliquis, morbid obesity, former smoker but vapes now, presents emergency department with chest pain.  She says she was been having chest pain since about 130 this afternoon.  She describes it as a heaviness in her left chest.  She said she felt it radiate down her left arm and cause some tingling.  She says she called EMS and they roving gave her 324 mg of aspirin.  They also gave her 1 spray of nitro which brought her pressure from the 180s down to the 115 range systolic.  It brought her pain down from a 5 down to a 1.  Currently patient states she has no pain.  She does it is not had any recent stress test.  The pain did not make her short of breath or vomit.  The pain did not make her sweat.  She says that she does not have any stents in her heart.  No recent stress test.  She does not have a cardiologist.    1/31/2023  Ms Tian has no chest pain but does have a Hx of A fib on eliquis. She would prefer to go home but will stay for the workup of chest pain      Past Medical History:   Diagnosis Date    *Atrial flutter     Angina pectoris 9/18/2017    Anxiety     Arthritis     Asthma     Atrial fibrillation     Back pain     Cataract     OD    CHF (congestive heart failure)      Chronically on benzodiazepine therapy 2019    COPD (chronic obstructive pulmonary disease)     Depression     Diabetes mellitus     Emphysema of lung     Heart failure     Hepatomegaly 2/3/2016    Hernia     History of MI (myocardial infarction) 2016    Hypercapnic respiratory failure, chronic 2016    Hyperlipidemia     Hypertension     Iron deficiency anemia 2/3/2016    Myocardial infarction     Obesity     Peripheral vascular disease     Pneumonia     Polyneuropathy     Retinal detachment     OS    Septic shock 2017    Skin ulcer 3/18/2017    Tobacco dependence     Type II 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, not stated as uncontrolled(250.60)        Past Surgical History:   Procedure Laterality Date    BREAST BIOPSY Left 10 yrs ago    benign    CATARACT EXTRACTION Left     OS      SECTION      x2    EYE SURGERY      HYSTERECTOMY      partial    OOPHORECTOMY      one ovary conserved    RETINAL DETACHMENT SURGERY      buckle --OS    TONSILLECTOMY         Review of patient's allergies indicates:   Allergen Reactions    Dilaudid [hydromorphone] Anaphylaxis     Other reaction(s): Anaphylaxis  Other reaction(s): Unknown    Zyvox [linezolid in dextrose 5%] Shortness Of Breath       No current facility-administered medications on file prior to encounter.     Current Outpatient Medications on File Prior to Encounter   Medication Sig    albuterol (PROVENTIL/VENTOLIN HFA) 90 mcg/actuation inhaler INHALE 2 PUFFS EVERY 6 HOURS AS NEEDED FOR WHEEZING  (RESCUE) (Patient taking differently: Inhale 2 puffs into the lungs every 6 (six) hours as needed.)    apixaban (ELIQUIS) 5 mg Tab Take 1 tablet (5 mg total) by mouth 2 (two) times daily.    ascorbic acid, vitamin C, (VITAMIN C) 500 MG tablet Take 2 tablets (1,000 mg total) by mouth every evening.    atorvastatin (LIPITOR) 10 MG tablet TAKE 1 TABLET(10 MG) BY MOUTH EVERY OTHER DAY  (Patient taking differently: Take 10 mg by mouth every other day.)    empagliflozin (JARDIANCE) 10 mg tablet Take 1 tablet (10 mg total) by mouth once daily.    fluticasone propionate (FLONASE) 50 mcg/actuation nasal spray SHAKE LIQUID AND USE 1 SPRAY(50 MCG) IN EACH NOSTRIL EVERY DAY    fluticasone-umeclidin-vilanter (TRELEGY ELLIPTA) 100-62.5-25 mcg DsDv INHALE 1 PUFF EVERY DAY EXPIRES 42 DAYS AFTER OPENING FOIL TRAY (Patient taking differently: Inhale 1 puff into the lungs once daily.)    furosemide (LASIX) 40 MG tablet TAKE 1 TABLET(40 MG) BY MOUTH EVERY DAY (Patient taking differently: Take 40 mg by mouth once daily.)    gabapentin (NEURONTIN) 800 MG tablet Take 1 tablet (800 mg total) by mouth 3 (three) times daily.    [START ON 2/10/2023] HYDROcodone-acetaminophen (NORCO) 7.5-325 mg per tablet Take 1 tablet by mouth every 12 (twelve) hours as needed for Pain. Medical necessary for greater than 7 days for chronic pain.    LIDOcaine (LIDODERM) 5 % APPLY PATCH ONTO SKIN.REMOVE AND DISCARD PATCH WITHIN 12 HOURS OR AS DIRECTED BY MD    melatonin 10 mg Tab Take 10 mg by mouth nightly.    metFORMIN (GLUCOPHAGE) 500 MG tablet Take 1 tablet (500 mg total) by mouth daily with breakfast.    multivitamin (THERAGRAN) tablet Take 1 tablet by mouth once daily.    nystatin (NYSTOP) powder APPLY TOPICALLY TWICE DAILY (Patient taking differently: Apply 1 g topically 2 (two) times daily. APPLY TOPICALLY TWICE DAILY)    nystatin-triamcinolone (MYCOLOG II) cream Apply topically 3 (three) times daily. to affected area (Patient taking differently: Apply 1 g topically 3 (three) times daily. to affected area)    omeprazole (PRILOSEC) 20 MG capsule Take 20 mg by mouth once daily.    ondansetron (ZOFRAN-ODT) 8 MG TbDL Take 1 tablet (8 mg total) by mouth every 12 (twelve) hours as needed.    potassium chloride SA (K-DUR,KLOR-CON) 20 MEQ tablet Take 20 mEq by mouth once daily.    spironolactone (ALDACTONE) 50 MG tablet  Take 1 tablet (50 mg total) by mouth once daily.    blood sugar diagnostic Strp To check BG 2 times daily, to use with insurance preferred meter    blood-glucose meter kit To check BG 2 times daily, to use with insurance preferred meter    lancets Misc To check BG 2 times daily, to use with insurance preferred meter    lisinopriL (PRINIVIL,ZESTRIL) 20 MG tablet Take 20 mg by mouth 2 (two) times a day.    traZODone (DESYREL) 50 MG tablet Take 1.5 tablets (75 mg total) by mouth nightly as needed for Insomnia (insomnia).    [DISCONTINUED] busPIRone (BUSPAR) 5 MG Tab Take 5 mg by mouth 2 (two) times daily.    [DISCONTINUED] HYDROcodone-acetaminophen (NORCO) 7.5-325 mg per tablet Take 1 tablet by mouth every 12 (twelve) hours as needed for Pain. Medical necessary for greater than 7 days for chronic pain.    [DISCONTINUED] potassium chloride SA (K-DUR,KLOR-CON M) 10 MEQ tablet Take 1 tablet (10 mEq total) by mouth once daily.     Family History       Problem Relation (Age of Onset)    Alcohol abuse Mother    Arrhythmia Father    Arthritis Father, Sister    Blindness Son    Cancer Brother    Cirrhosis Mother    Coronary artery disease Father, Sister    Crohn's disease Sister    Early death Sister (30)    Heart attack Father, Sister    Heart disease Father, Sister (32), Sister    Lung cancer Brother    No Known Problems Brother, Daughter          Tobacco Use    Smoking status: Former     Packs/day: 0.30     Years: 50.00     Pack years: 15.00     Types: Cigarettes     Start date: 1968     Quit date: 2022     Years since quittin.5    Smokeless tobacco: Never   Substance and Sexual Activity    Alcohol use: No     Alcohol/week: 0.0 standard drinks    Drug use: No    Sexual activity: Not Currently     Review of Systems   Constitutional:  Positive for activity change, diaphoresis and fatigue. Negative for chills and fever.   HENT: Negative.     Eyes: Negative.    Respiratory:  Positive for chest  tightness and shortness of breath.    Cardiovascular:  Positive for chest pain.   Gastrointestinal: Negative.    Endocrine: Positive for heat intolerance.   Genitourinary: Negative.    Musculoskeletal:  Positive for arthralgias, gait problem and myalgias.   Skin: Negative.    Allergic/Immunologic: Positive for immunocompromised state.   Neurological:  Positive for weakness.   Hematological:  Bruises/bleeds easily.   Psychiatric/Behavioral:  The patient is nervous/anxious.    Objective:     Vital Signs (Most Recent):  Temp: 97.8 °F (36.6 °C) (01/31/23 1559)  Pulse: 76 (01/31/23 1559)  Resp: 20 (01/31/23 1559)  BP: (!) 114/42 (01/31/23 1559)  SpO2: (!) 94 % (01/31/23 1559)   Vital Signs (24h Range):  Temp:  [97.8 °F (36.6 °C)] 97.8 °F (36.6 °C)  Pulse:  [76] 76  Resp:  [20] 20  SpO2:  [94 %] 94 %  BP: (114)/(42) 114/42     Weight: 113.9 kg (251 lb)  Body mass index is 38.16 kg/m².    Physical Exam  Vitals and nursing note reviewed.   Constitutional:       Appearance: Normal appearance.   HENT:      Head: Normocephalic and atraumatic.      Nose: Nose normal.      Mouth/Throat:      Mouth: Mucous membranes are moist.   Eyes:      Extraocular Movements: Extraocular movements intact.      Pupils: Pupils are equal, round, and reactive to light.   Cardiovascular:      Rate and Rhythm: Normal rate and regular rhythm.      Heart sounds:     Gallop present.   Pulmonary:      Effort: Pulmonary effort is normal.      Breath sounds: Normal breath sounds.   Abdominal:      General: Bowel sounds are normal.   Musculoskeletal:         General: Normal range of motion.      Cervical back: Normal range of motion and neck supple.   Skin:     General: Skin is warm.   Neurological:      Mental Status: She is alert and oriented to person, place, and time.   Psychiatric:      Comments: Anxious and mildly dysphoriic         CRANIAL NERVES     CN III, IV, VI   Pupils are equal, round, and reactive to light.     Significant Labs: All pertinent  labs within the past 24 hours have been reviewed.  Recent Lab Results         01/31/23  1653        Albumin 3.9       Alkaline Phosphatase 59       ALT 24       Anion Gap 12       AST 31       Baso # 0.07       Basophil % 0.7       BILIRUBIN TOTAL 0.8  Comment: For infants and newborns, interpretation of results should be based  on gestational age, weight and in agreement with clinical  observations.    Premature Infant recommended reference ranges:  Up to 24 hours.............<8.0 mg/dL  Up to 48 hours............<12.0 mg/dL  3-5 days..................<15.0 mg/dL  6-29 days.................<15.0 mg/dL           Comment: Values of less than 100 pg/ml are consistent with non-CHF populations.       BUN 24       Calcium 10.1       Chloride 93       CO2 29       Creatinine 1.0       Differential Method Automated       eGFR >60.0       Eos # 0.1       Eosinophil % 0.7       Glucose 109       Gran # (ANC) 7.7       Gran % 77.7       Hematocrit 41.2       Hemoglobin 13.6       Immature Grans (Abs) 0.05  Comment: Mild elevation in immature granulocytes is non specific and   can be seen in a variety of conditions including stress response,   acute inflammation, trauma and pregnancy. Correlation with other   laboratory and clinical findings is essential.         Immature Granulocytes 0.5       Lymph # 1.1       Lymph % 11.4       MCH 31.1       MCHC 33.0       MCV 94       Mono # 0.9       Mono % 9.0       MPV 9.8       nRBC 0       Platelets 213       Potassium 3.9       PROTEIN TOTAL 7.2       RBC 4.38       RDW 13.8       Sodium 134       Troponin I High Sensitivity 10.5  Comment: Troponin results differ between methods. Do not use   results between Troponin methods interchangeably.    Alkaline Phospatase levels above 400 U/L may   cause false positive results.    Access hsTnI should not be used for patients taking   Asfotase rubén (Strensiq).         WBC 9.97               Significant Imaging: I have reviewed all  pertinent imaging results/findings within the past 24 hours.    Assessment/Plan:     * Chest pain  No chest pain at present  Observe telemetry  NPO past midnight  Echocardiogram  Serial troponin's  Cardiology consulted        VTE Risk Mitigation (From admission, onward)    None             Chu Eason MD  Department of Hospital Medicine   [facility] Dept

## 2023-02-01 NOTE — HPI
ED note  72-year-old female with past medical history of diabetes, hypertension, atrial fibrillation on Eliquis, morbid obesity, former smoker but vapes now, presents emergency department with chest pain.  She says she was been having chest pain since about 130 this afternoon.  She describes it as a heaviness in her left chest.  She said she felt it radiate down her left arm and cause some tingling.  She says she called EMS and they roving gave her 324 mg of aspirin.  They also gave her 1 spray of nitro which brought her pressure from the 180s down to the 115 range systolic.  It brought her pain down from a 5 down to a 1.  Currently patient states she has no pain.  She does it is not had any recent stress test.  The pain did not make her short of breath or vomit.  The pain did not make her sweat.  She says that she does not have any stents in her heart.  No recent stress test.  She does not have a cardiologist.    1/31/2023  Ms Tian has no chest pain but does have a Hx of A fib on eliquis. She would prefer to go home but will stay for the workup of chest pain

## 2023-02-01 NOTE — SUBJECTIVE & OBJECTIVE
Past Medical History:   Diagnosis Date    *Atrial flutter     Angina pectoris 2017    Anxiety     Arthritis     Asthma     Atrial fibrillation     Back pain     Cataract     OD    CHF (congestive heart failure)     Chronically on benzodiazepine therapy 2019    COPD (chronic obstructive pulmonary disease)     Depression     Diabetes mellitus     Emphysema of lung     Heart failure     Hepatomegaly 2/3/2016    Hernia     History of MI (myocardial infarction) 2016    Hypercapnic respiratory failure, chronic 2016    Hyperlipidemia     Hypertension     Iron deficiency anemia 2/3/2016    Myocardial infarction     Obesity     Peripheral vascular disease     Pneumonia     Polyneuropathy     Retinal detachment     OS    Septic shock 2017    Skin ulcer 3/18/2017    Tobacco dependence     Type II 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, not stated as uncontrolled(250.60)        Past Surgical History:   Procedure Laterality Date    BREAST BIOPSY Left 10 yrs ago    benign    CATARACT EXTRACTION Left     OS      SECTION      x2    EYE SURGERY      HYSTERECTOMY      partial    OOPHORECTOMY      one ovary conserved    RETINAL DETACHMENT SURGERY      buckle --OS    TONSILLECTOMY         Review of patient's allergies indicates:   Allergen Reactions    Dilaudid [hydromorphone] Anaphylaxis     Other reaction(s): Anaphylaxis  Other reaction(s): Unknown    Zyvox [linezolid in dextrose 5%] Shortness Of Breath       No current facility-administered medications on file prior to encounter.     Current Outpatient Medications on File Prior to Encounter   Medication Sig    albuterol (PROVENTIL/VENTOLIN HFA) 90 mcg/actuation inhaler INHALE 2 PUFFS EVERY 6 HOURS AS NEEDED FOR WHEEZING  (RESCUE) (Patient taking differently: Inhale 2 puffs into the lungs every 6 (six) hours as needed.)    apixaban (ELIQUIS) 5 mg Tab Take 1 tablet (5 mg total) by mouth 2 (two) times daily.    ascorbic  acid, vitamin C, (VITAMIN C) 500 MG tablet Take 2 tablets (1,000 mg total) by mouth every evening.    atorvastatin (LIPITOR) 10 MG tablet TAKE 1 TABLET(10 MG) BY MOUTH EVERY OTHER DAY (Patient taking differently: Take 10 mg by mouth every other day.)    empagliflozin (JARDIANCE) 10 mg tablet Take 1 tablet (10 mg total) by mouth once daily.    fluticasone propionate (FLONASE) 50 mcg/actuation nasal spray SHAKE LIQUID AND USE 1 SPRAY(50 MCG) IN EACH NOSTRIL EVERY DAY    fluticasone-umeclidin-vilanter (TRELEGY ELLIPTA) 100-62.5-25 mcg DsDv INHALE 1 PUFF EVERY DAY EXPIRES 42 DAYS AFTER OPENING FOIL TRAY (Patient taking differently: Inhale 1 puff into the lungs once daily.)    furosemide (LASIX) 40 MG tablet TAKE 1 TABLET(40 MG) BY MOUTH EVERY DAY (Patient taking differently: Take 40 mg by mouth once daily.)    gabapentin (NEURONTIN) 800 MG tablet Take 1 tablet (800 mg total) by mouth 3 (three) times daily.    [START ON 2/10/2023] HYDROcodone-acetaminophen (NORCO) 7.5-325 mg per tablet Take 1 tablet by mouth every 12 (twelve) hours as needed for Pain. Medical necessary for greater than 7 days for chronic pain.    LIDOcaine (LIDODERM) 5 % APPLY PATCH ONTO SKIN.REMOVE AND DISCARD PATCH WITHIN 12 HOURS OR AS DIRECTED BY MD    melatonin 10 mg Tab Take 10 mg by mouth nightly.    metFORMIN (GLUCOPHAGE) 500 MG tablet Take 1 tablet (500 mg total) by mouth daily with breakfast.    multivitamin (THERAGRAN) tablet Take 1 tablet by mouth once daily.    nystatin (NYSTOP) powder APPLY TOPICALLY TWICE DAILY (Patient taking differently: Apply 1 g topically 2 (two) times daily. APPLY TOPICALLY TWICE DAILY)    nystatin-triamcinolone (MYCOLOG II) cream Apply topically 3 (three) times daily. to affected area (Patient taking differently: Apply 1 g topically 3 (three) times daily. to affected area)    omeprazole (PRILOSEC) 20 MG capsule Take 20 mg by mouth once daily.    ondansetron (ZOFRAN-ODT) 8 MG TbDL Take 1 tablet (8 mg total) by mouth  every 12 (twelve) hours as needed.    potassium chloride SA (K-DUR,KLOR-CON) 20 MEQ tablet Take 20 mEq by mouth once daily.    spironolactone (ALDACTONE) 50 MG tablet Take 1 tablet (50 mg total) by mouth once daily.    blood sugar diagnostic Strp To check BG 2 times daily, to use with insurance preferred meter    blood-glucose meter kit To check BG 2 times daily, to use with insurance preferred meter    lancets Misc To check BG 2 times daily, to use with insurance preferred meter    lisinopriL (PRINIVIL,ZESTRIL) 20 MG tablet Take 20 mg by mouth 2 (two) times a day.    traZODone (DESYREL) 50 MG tablet Take 1.5 tablets (75 mg total) by mouth nightly as needed for Insomnia (insomnia).    [DISCONTINUED] busPIRone (BUSPAR) 5 MG Tab Take 5 mg by mouth 2 (two) times daily.    [DISCONTINUED] HYDROcodone-acetaminophen (NORCO) 7.5-325 mg per tablet Take 1 tablet by mouth every 12 (twelve) hours as needed for Pain. Medical necessary for greater than 7 days for chronic pain.    [DISCONTINUED] potassium chloride SA (K-DUR,KLOR-CON M) 10 MEQ tablet Take 1 tablet (10 mEq total) by mouth once daily.     Family History       Problem Relation (Age of Onset)    Alcohol abuse Mother    Arrhythmia Father    Arthritis Father, Sister    Blindness Son    Cancer Brother    Cirrhosis Mother    Coronary artery disease Father, Sister    Crohn's disease Sister    Early death Sister (30)    Heart attack Father, Sister    Heart disease Father, Sister (32), Sister    Lung cancer Brother    No Known Problems Brother, Daughter          Tobacco Use    Smoking status: Former     Packs/day: 0.30     Years: 50.00     Pack years: 15.00     Types: Cigarettes     Start date: 1968     Quit date: 2022     Years since quittin.5    Smokeless tobacco: Never   Substance and Sexual Activity    Alcohol use: No     Alcohol/week: 0.0 standard drinks    Drug use: No    Sexual activity: Not Currently     Review of Systems   Constitutional:  Positive for  activity change, diaphoresis and fatigue. Negative for chills and fever.   HENT: Negative.     Eyes: Negative.    Respiratory:  Positive for chest tightness and shortness of breath.    Cardiovascular:  Positive for chest pain.   Gastrointestinal: Negative.    Endocrine: Positive for heat intolerance.   Genitourinary: Negative.    Musculoskeletal:  Positive for arthralgias, gait problem and myalgias.   Skin: Negative.    Allergic/Immunologic: Positive for immunocompromised state.   Neurological:  Positive for weakness.   Hematological:  Bruises/bleeds easily.   Psychiatric/Behavioral:  The patient is nervous/anxious.    Objective:     Vital Signs (Most Recent):  Temp: 97.8 °F (36.6 °C) (01/31/23 1559)  Pulse: 76 (01/31/23 1559)  Resp: 20 (01/31/23 1559)  BP: (!) 114/42 (01/31/23 1559)  SpO2: (!) 94 % (01/31/23 1559)   Vital Signs (24h Range):  Temp:  [97.8 °F (36.6 °C)] 97.8 °F (36.6 °C)  Pulse:  [76] 76  Resp:  [20] 20  SpO2:  [94 %] 94 %  BP: (114)/(42) 114/42     Weight: 113.9 kg (251 lb)  Body mass index is 38.16 kg/m².    Physical Exam  Vitals and nursing note reviewed.   Constitutional:       Appearance: Normal appearance.   HENT:      Head: Normocephalic and atraumatic.      Nose: Nose normal.      Mouth/Throat:      Mouth: Mucous membranes are moist.   Eyes:      Extraocular Movements: Extraocular movements intact.      Pupils: Pupils are equal, round, and reactive to light.   Cardiovascular:      Rate and Rhythm: Normal rate and regular rhythm.      Heart sounds:     Gallop present.   Pulmonary:      Effort: Pulmonary effort is normal.      Breath sounds: Normal breath sounds.   Abdominal:      General: Bowel sounds are normal.   Musculoskeletal:         General: Normal range of motion.      Cervical back: Normal range of motion and neck supple.   Skin:     General: Skin is warm.   Neurological:      Mental Status: She is alert and oriented to person, place, and time.   Psychiatric:      Comments: Anxious  and mildly dysphoriic         CRANIAL NERVES     CN III, IV, VI   Pupils are equal, round, and reactive to light.     Significant Labs: All pertinent labs within the past 24 hours have been reviewed.  Recent Lab Results         01/31/23  1653        Albumin 3.9       Alkaline Phosphatase 59       ALT 24       Anion Gap 12       AST 31       Baso # 0.07       Basophil % 0.7       BILIRUBIN TOTAL 0.8  Comment: For infants and newborns, interpretation of results should be based  on gestational age, weight and in agreement with clinical  observations.    Premature Infant recommended reference ranges:  Up to 24 hours.............<8.0 mg/dL  Up to 48 hours............<12.0 mg/dL  3-5 days..................<15.0 mg/dL  6-29 days.................<15.0 mg/dL           Comment: Values of less than 100 pg/ml are consistent with non-CHF populations.       BUN 24       Calcium 10.1       Chloride 93       CO2 29       Creatinine 1.0       Differential Method Automated       eGFR >60.0       Eos # 0.1       Eosinophil % 0.7       Glucose 109       Gran # (ANC) 7.7       Gran % 77.7       Hematocrit 41.2       Hemoglobin 13.6       Immature Grans (Abs) 0.05  Comment: Mild elevation in immature granulocytes is non specific and   can be seen in a variety of conditions including stress response,   acute inflammation, trauma and pregnancy. Correlation with other   laboratory and clinical findings is essential.         Immature Granulocytes 0.5       Lymph # 1.1       Lymph % 11.4       MCH 31.1       MCHC 33.0       MCV 94       Mono # 0.9       Mono % 9.0       MPV 9.8       nRBC 0       Platelets 213       Potassium 3.9       PROTEIN TOTAL 7.2       RBC 4.38       RDW 13.8       Sodium 134       Troponin I High Sensitivity 10.5  Comment: Troponin results differ between methods. Do not use   results between Troponin methods interchangeably.    Alkaline Phospatase levels above 400 U/L may   cause false positive  results.    Access hsTnI should not be used for patients taking   Asfotase rubén (Strensiq).         WBC 9.97               Significant Imaging: I have reviewed all pertinent imaging results/findings within the past 24 hours.

## 2023-02-01 NOTE — DISCHARGE INSTRUCTIONS
Diet:  Resume cardiac diet    Physical Activity:  Activity as tolerated    Instructions:  1. Take all medications as prescribed  2. Keep all follow-up appointments  3. Return to the hospital or call your primary care physicians if any worsening symptoms such as chest pain, shortness of breath occur.    Follow-Up Appointments:  Please call your primary care physician to schedule an appointment in 1 week time.

## 2023-02-02 ENCOUNTER — TELEPHONE (OUTPATIENT)
Dept: FAMILY MEDICINE | Facility: CLINIC | Age: 73
End: 2023-02-02
Payer: MEDICARE

## 2023-02-02 ENCOUNTER — TELEPHONE (OUTPATIENT)
Dept: OPTOMETRY | Facility: CLINIC | Age: 73
End: 2023-02-02
Payer: MEDICARE

## 2023-02-02 NOTE — TELEPHONE ENCOUNTER
----- Message from Suzi Love sent at 2/2/2023  9:31 AM CST -----  Contact: pt  Type:  Sooner Appointment Request    Caller is requesting a sooner appointment.  Caller declined first available appointment listed below.  Caller will not accept being placed on the waitlist and is requesting a message be sent to doctor.    Name of Caller:  pt   When is the first available appointment?  03/07  Symptoms:  hospital follow up   Best Call Back Number:  [phone]    Additional Information:  pt would like a sooner visit with provider. Please advise.        Alan Shiam Patient Age: 25 year old  MESSAGE: Interpreting service used: No      IM/FP- Medication Question-     Name of the Pharmacy: [facility]     Name of the medication: meloxicam (MOBIC) 15 MG tablet    Question about: confirm with other meds pt is on         Select provider's home site [place]- Connect call to [facility] queue- Route message to provider's clinical support pool       ALLERGIES:  Patient has no known allergies.  Current Outpatient Medications   Medication   • meloxicam (MOBIC) 15 MG tablet   • ciclopirox olamine (LOPROX) 0.77 % cream   • DULoxetine (Cymbalta) 30 MG capsule   • docusate sodium-sennosides (SENOKOT S) 50-8.6 MG per tablet   • HYDROcodone-acetaminophen (NORCO) 5-325 MG per tablet     No current facility-administered medications for this visit.     PHARMACY to use:           Pharmacy preference(s) on file:   [id number] - [place], [place] - [address]  [address] [address]  Phone: [phone] Fax: [phone]      CALL BACK INFO: Ok to leave response (including medical information) on answering machine      PCP: Sara Connelly MD         INS: Payor: [employer] / Plan: [employer] [id number] / Product Type: [employer]   PATIENT ADDRESS:  [address] [place] [id number]

## 2023-02-02 NOTE — TELEPHONE ENCOUNTER
----- Message from Daniela Dickey sent at 2/2/2023 10:19 AM CST -----  Contact: patient  Type:  Sooner Appointment Request    Caller is requesting a sooner appointment.      Name of Caller:  patient  When is the first available appointment?  4/12  Symptoms:  eye exam  Best Call Back Number:  [phone]   Additional Information:  patient wants to know if she can be seen sooner, she broke her eye glasses and cannot see.

## 2023-02-02 NOTE — TELEPHONE ENCOUNTER
----- Message from Carissa Gongora sent at 2/2/2023 10:41 AM CST -----  Regarding: missed call  Type:  Patient Returning Call         Who Called:NYLA GIBBS [[id number]]         Who Left Message for Patient:Allie Alcala         Does the patient know what this is regarding? Possibly appt but not sure          Best Call Back Number: [phone]         Additional Information:

## 2023-02-08 ENCOUNTER — OFFICE VISIT (OUTPATIENT)
Dept: CARDIOLOGY | Facility: CLINIC | Age: 73
End: 2023-02-08
Payer: MEDICARE

## 2023-02-08 VITALS
SYSTOLIC BLOOD PRESSURE: 124 MMHG | BODY MASS INDEX: 38.34 KG/M2 | WEIGHT: 253 LBS | HEIGHT: 68 IN | DIASTOLIC BLOOD PRESSURE: 76 MMHG | HEART RATE: 61 BPM | OXYGEN SATURATION: 95 %

## 2023-02-08 DIAGNOSIS — I15.2 HYPERTENSION ASSOCIATED WITH DIABETES: ICD-10-CM

## 2023-02-08 DIAGNOSIS — E11.59 HYPERTENSION ASSOCIATED WITH DIABETES: ICD-10-CM

## 2023-02-08 DIAGNOSIS — E78.5 HYPERLIPIDEMIA ASSOCIATED WITH TYPE 2 DIABETES MELLITUS: ICD-10-CM

## 2023-02-08 DIAGNOSIS — E66.01 SEVERE OBESITY (BMI 35.0-35.9 WITH COMORBIDITY): ICD-10-CM

## 2023-02-08 DIAGNOSIS — I48.20 CHRONIC ATRIAL FIBRILLATION: ICD-10-CM

## 2023-02-08 DIAGNOSIS — Z79.01 LONG TERM CURRENT USE OF ANTICOAGULANT THERAPY: ICD-10-CM

## 2023-02-08 DIAGNOSIS — I20.0 UNSTABLE ANGINA PECTORIS: ICD-10-CM

## 2023-02-08 DIAGNOSIS — I50.42 CHRONIC COMBINED SYSTOLIC AND DIASTOLIC CHF (CONGESTIVE HEART FAILURE): ICD-10-CM

## 2023-02-08 DIAGNOSIS — I73.9 PVD (PERIPHERAL VASCULAR DISEASE): ICD-10-CM

## 2023-02-08 DIAGNOSIS — E11.69 HYPERLIPIDEMIA ASSOCIATED WITH TYPE 2 DIABETES MELLITUS: ICD-10-CM

## 2023-02-08 PROBLEM — R07.9 CHEST PAIN: Status: RESOLVED | Noted: 2017-09-17 | Resolved: 2023-02-08

## 2023-02-08 PROCEDURE — 1160F RVW MEDS BY RX/DR IN RCRD: CPT | Mod: CPTII,S$GLB,, | Performed by: NURSE PRACTITIONER

## 2023-02-08 PROCEDURE — 3078F DIAST BP <80 MM HG: CPT | Mod: CPTII,S$GLB,, | Performed by: NURSE PRACTITIONER

## 2023-02-08 PROCEDURE — 99213 PR OFFICE/OUTPT VISIT, EST, LEVL III, 20-29 MIN: ICD-10-PCS | Mod: S$GLB,,, | Performed by: NURSE PRACTITIONER

## 2023-02-08 PROCEDURE — 1160F PR REVIEW ALL MEDS BY PRESCRIBER/CLIN PHARMACIST DOCUMENTED: ICD-10-PCS | Mod: CPTII,S$GLB,, | Performed by: NURSE PRACTITIONER

## 2023-02-08 PROCEDURE — 3008F BODY MASS INDEX DOCD: CPT | Mod: CPTII,S$GLB,, | Performed by: NURSE PRACTITIONER

## 2023-02-08 PROCEDURE — 99999 PR PBB SHADOW E&M-EST. PATIENT-LVL IV: CPT | Mod: PBBFAC,,, | Performed by: NURSE PRACTITIONER

## 2023-02-08 PROCEDURE — 1159F MED LIST DOCD IN RCRD: CPT | Mod: CPTII,S$GLB,, | Performed by: NURSE PRACTITIONER

## 2023-02-08 PROCEDURE — 1101F PR PT FALLS ASSESS DOC 0-1 FALLS W/OUT INJ PAST YR: ICD-10-PCS | Mod: CPTII,S$GLB,, | Performed by: NURSE PRACTITIONER

## 2023-02-08 PROCEDURE — 1157F ADVNC CARE PLAN IN RCRD: CPT | Mod: CPTII,S$GLB,, | Performed by: NURSE PRACTITIONER

## 2023-02-08 PROCEDURE — 1126F PR PAIN SEVERITY QUANTIFIED, NO PAIN PRESENT: ICD-10-PCS | Mod: CPTII,S$GLB,, | Performed by: NURSE PRACTITIONER

## 2023-02-08 PROCEDURE — 3288F PR FALLS RISK ASSESSMENT DOCUMENTED: ICD-10-PCS | Mod: CPTII,S$GLB,, | Performed by: NURSE PRACTITIONER

## 2023-02-08 PROCEDURE — 99999 PR PBB SHADOW E&M-EST. PATIENT-LVL IV: ICD-10-PCS | Mod: PBBFAC,,, | Performed by: NURSE PRACTITIONER

## 2023-02-08 PROCEDURE — 1101F PT FALLS ASSESS-DOCD LE1/YR: CPT | Mod: CPTII,S$GLB,, | Performed by: NURSE PRACTITIONER

## 2023-02-08 PROCEDURE — 3008F PR BODY MASS INDEX (BMI) DOCUMENTED: ICD-10-PCS | Mod: CPTII,S$GLB,, | Performed by: NURSE PRACTITIONER

## 2023-02-08 PROCEDURE — 1159F PR MEDICATION LIST DOCUMENTED IN MEDICAL RECORD: ICD-10-PCS | Mod: CPTII,S$GLB,, | Performed by: NURSE PRACTITIONER

## 2023-02-08 PROCEDURE — 1157F PR ADVANCE CARE PLAN OR EQUIV PRESENT IN MEDICAL RECORD: ICD-10-PCS | Mod: CPTII,S$GLB,, | Performed by: NURSE PRACTITIONER

## 2023-02-08 PROCEDURE — 3074F SYST BP LT 130 MM HG: CPT | Mod: CPTII,S$GLB,, | Performed by: NURSE PRACTITIONER

## 2023-02-08 PROCEDURE — 3078F PR MOST RECENT DIASTOLIC BLOOD PRESSURE < 80 MM HG: ICD-10-PCS | Mod: CPTII,S$GLB,, | Performed by: NURSE PRACTITIONER

## 2023-02-08 PROCEDURE — 99213 OFFICE O/P EST LOW 20 MIN: CPT | Mod: S$GLB,,, | Performed by: NURSE PRACTITIONER

## 2023-02-08 PROCEDURE — 3288F FALL RISK ASSESSMENT DOCD: CPT | Mod: CPTII,S$GLB,, | Performed by: NURSE PRACTITIONER

## 2023-02-08 PROCEDURE — 1126F AMNT PAIN NOTED NONE PRSNT: CPT | Mod: CPTII,S$GLB,, | Performed by: NURSE PRACTITIONER

## 2023-02-08 PROCEDURE — 3074F PR MOST RECENT SYSTOLIC BLOOD PRESSURE < 130 MM HG: ICD-10-PCS | Mod: CPTII,S$GLB,, | Performed by: NURSE PRACTITIONER

## 2023-02-08 NOTE — ASSESSMENT & PLAN NOTE
Pt was changed to spironolactone 50 mg daily recently and denies any adverse reactions with this medication  Advised Low sodium diet  Patient is also doing mild stretching and exercising at home and was encouraged to continue to do the same

## 2023-02-08 NOTE — ASSESSMENT & PLAN NOTE
IRR in office today. Continue apixiban 5 mg BID   No beta blocker; history of HR dropping to 45. Currently 61 bpm

## 2023-02-08 NOTE — ASSESSMENT & PLAN NOTE
Patient is identified as having combined systolic and diastolic heart failure that is chronic. CHF is currently controlled. Latest ECHO performed and demonstrates- Results for orders placed during the hospital encounter of 01/31/23    Echo Saline Bubble? No    Interpretation Summary  · Moderate left atrial enlargement.  · Mild pulmonic regurgitation.  · Mild mitral regurgitation.  · A diastolic pattern consistent with atrial fibrillation observed.  · Limited study; 4 chamber 2chamber long axis, 2 chamber long axis views not seen, suggest repeat study  . Continue Lasix and spironolactone and monitor clinical status closely.  Patient states she was on lisinopril could not tolerate this so it has been stopped and replaced with spironolactone Monitor strict Is&Os and daily weights.  Place on fluid restriction of 1.5 L Continue to stress to patient importance of self efficacy and  on diet for CHF. Last BNP reviewed- and noted below @LABRCNTIP(BNP,BNPTRIAGEBLO)@.

## 2023-02-08 NOTE — ASSESSMENT & PLAN NOTE
Patient was unable to fit on the machines for stress testing.  She states that she is been home she is not had any recurrent chest pain discussed importance of blood pressure control and heart healthy eating.

## 2023-02-08 NOTE — PROGRESS NOTES
Subjective:    Patient ID:  Nelly Tian is a 72 y.o. female patient here for evaluation Hospital Follow Up      History of Present Illness:  Patient is in the clinic today with her daughter.  She is here to follow-up after hospital visit.  She was admitted at [facility] on 01/31/2023 and discharged on 02/01/2023.  Hospital course below:  Nelly Tian is a 72 y.o.  female who  has a past medical history of *Atrial flutter, Angina pectoris (9/18/2017), Anxiety, Arthritis, Asthma, Atrial fibrillation, Back pain, Cataract, CHF (congestive heart failure), Chronically on benzodiazepine therapy (5/4/2019), COPD (chronic obstructive pulmonary disease), Depression, Diabetes mellitus, Emphysema of lung, Heart failure, Hepatomegaly (2/3/2016), Hernia, History of MI (myocardial infarction) (1/19/2016), Hypercapnic respiratory failure, chronic (11/16/2016), Hyperlipidemia, Hypertension, Iron deficiency anemia (2/3/2016), Myocardial infarction, Obesity, Peripheral vascular disease, Pneumonia, Polyneuropathy, Retinal detachment, Septic shock (4/23/2017), Skin ulcer (3/18/2017), Tobacco dependence, and Type II 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, not stated as uncontrolled(250.60).. The patient presented to [facility] on 1/31/2023 with a primary complaint of Chest Pain (Chest pain since 10 am radiating down the left arm and into back. EMS gave 0.4 sublingual nitro and 324 ASA.     Admitted for chest pain. Stress test ordered by cardiology however patient not able to have stress test due to body habitus. Patient discharged home with outpatient follow up for stress echo.       2/8/23  Today She denies any chest pain, shortness of breath, dizziness, syncope    Her only complaint today is insomnia and she asks for something to help her sleep.  She states she is taking 20 mg of melatonin at night and is not helping  Verified with patient that she is taking opioids and  gabapentin.  She sees Dr. Bird for primary care  She uses a walker for ambulation        Review of patient's allergies indicates:   Allergen Reactions    Dilaudid [hydromorphone] Anaphylaxis     Other reaction(s): Anaphylaxis  Other reaction(s): Unknown    Zyvox [linezolid in dextrose 5%] Shortness Of Breath       Past Medical History:   Diagnosis Date    *Atrial flutter     Angina pectoris 2017    Anxiety     Arthritis     Asthma     Atrial fibrillation     Back pain     Cataract     OD    Chest pain 2017    CHF (congestive heart failure)     Chronically on benzodiazepine therapy 2019    COPD (chronic obstructive pulmonary disease)     Depression     Diabetes mellitus     Emphysema of lung     Heart failure     Hepatomegaly 2/3/2016    Hernia     History of MI (myocardial infarction) 2016    Hypercapnic respiratory failure, chronic 2016    Hyperlipidemia     Hypertension     Iron deficiency anemia 2/3/2016    Myocardial infarction     Obesity     Peripheral vascular disease     Pneumonia     Polyneuropathy     Retinal detachment     OS    Septic shock 2017    Skin ulcer 3/18/2017    Tobacco dependence     Type II 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, not stated as uncontrolled(250.60)      Past Surgical History:   Procedure Laterality Date    BREAST BIOPSY Left 10 yrs ago    benign    CATARACT EXTRACTION Left     OS      SECTION      x2    EYE SURGERY      HYSTERECTOMY      partial    OOPHORECTOMY      one ovary conserved    RETINAL DETACHMENT SURGERY      buckle --OS    TONSILLECTOMY       Social History     Tobacco Use    Smoking status: Former     Packs/day: 0.30     Years: 50.00     Pack years: 15.00     Types: Cigarettes     Start date: 1968     Quit date: 2022     Years since quittin.6    Smokeless tobacco: Never   Substance Use Topics    Alcohol use: No     Alcohol/week: 0.0 standard drinks    Drug use: No        REVIEW OF  SYSTEMS: As noted in HPI   CARDIOVASCULAR: No recent chest pain, palpitations, arm, neck, or jaw pain  RESPIRATORY: No recent fever, cough chills, SOB or congestion  : No blood in the urine  GI: No Nausea, vomiting, constipation, diarrhea, blood, or reflux.  MUSCULOSKELETAL: No myalgias  NEURO: No lightheadedness or dizziness  EYES: No Double vision, blurry, vision or headache        Objective        Vitals:    02/08/23 0755   BP: 124/76   Pulse: 61       LIPIDS - LAST 2   Lab Results   Component Value Date    CHOL 89 (L) 03/25/2022    CHOL 100 (L) 07/20/2021    HDL 37 (L) 03/25/2022    HDL 42 07/20/2021    LDLCALC 41.6 (L) 03/25/2022    LDLCALC 49.4 (L) 07/20/2021    TRIG 52 03/25/2022    TRIG 43 07/20/2021    CHOLHDL 41.6 03/25/2022    CHOLHDL 42.0 07/20/2021       CBC - LAST 2  Lab Results   Component Value Date    WBC 7.92 02/01/2023    WBC 9.97 01/31/2023    RBC 4.61 02/01/2023    RBC 4.38 01/31/2023    HGB 14.1 02/01/2023    HGB 13.6 01/31/2023    HCT 44.2 02/01/2023    HCT 41.2 01/31/2023    MCV 96 02/01/2023    MCV 94 01/31/2023    MCH 30.6 02/01/2023    MCH 31.1 (H) 01/31/2023    MCHC 31.9 (L) 02/01/2023    MCHC 33.0 01/31/2023    RDW 14.1 02/01/2023    RDW 13.8 01/31/2023     02/01/2023     01/31/2023    MPV 10.2 02/01/2023    MPV 9.8 01/31/2023    GRAN 5.0 02/01/2023    GRAN 63.4 02/01/2023    LYMPH 1.8 02/01/2023    LYMPH 22.1 02/01/2023    MONO 1.0 02/01/2023    MONO 12.0 02/01/2023    BASO 0.05 02/01/2023    BASO 0.07 01/31/2023    NRBC 0 02/01/2023    NRBC 0 01/31/2023       CHEMISTRY & LIVER FUNCTION - LAST 2  Lab Results   Component Value Date     02/01/2023     (L) 01/31/2023    K 3.6 02/01/2023    K 3.9 01/31/2023     02/01/2023    CL 93 (L) 01/31/2023    CO2 28 02/01/2023    CO2 29 01/31/2023    ANIONGAP 9 02/01/2023    ANIONGAP 12 01/31/2023    BUN 26 (H) 02/01/2023    BUN 24 (H) 01/31/2023    CREATININE 0.7 02/01/2023    CREATININE 1.0 01/31/2023    GLU 87  02/01/2023     01/31/2023    CALCIUM 10.2 02/01/2023    CALCIUM 10.1 01/31/2023    PH 7.383 05/04/2019    PH 7.414 10/11/2017    MG 2.0 02/01/2023    MG 1.9 08/19/2022    ALBUMIN 3.9 01/31/2023    ALBUMIN 4.4 12/05/2022    PROT 7.2 01/31/2023    PROT 8.2 12/05/2022    ALKPHOS 59 01/31/2023    ALKPHOS 91 12/05/2022    ALT 24 01/31/2023    ALT 34 12/05/2022    AST 31 01/31/2023    AST 35 12/05/2022    BILITOT 0.8 01/31/2023    BILITOT 0.3 12/05/2022        CARDIAC PROFILE - LAST 2  Lab Results   Component Value Date     (H) 01/31/2023     (H) 12/02/2022     06/10/2010    CPK 98 11/08/2008    CPKMB 2.2 06/10/2010    CPKMB 2.9 11/08/2008     01/27/2016     (H) 06/10/2010    TROPONINI 0.015 03/27/2022    TROPONINI 0.016 03/27/2022        COAGULATION - LAST 2  Lab Results   Component Value Date    INR 1.0 06/27/2019    INR 1.1 09/23/2018    APTT 27.1 09/23/2018    APTT 23.7 08/22/2018       ENDOCRINE & PSA - LAST 2  Lab Results   Component Value Date    HGBA1C 5.5 12/05/2022    HGBA1C 5.1 07/22/2022    TSH 1.309 01/11/2021    TSH 0.405 05/03/2019    PROCAL 0.05 03/27/2022    PROCAL 0.15 12/14/2019        ECHOCARDIOGRAM RESULTS  Results for orders placed during the hospital encounter of 01/31/23    Echo Saline Bubble? No    Interpretation Summary  · Moderate left atrial enlargement.  · Mild pulmonic regurgitation.  · Mild mitral regurgitation.  · A diastolic pattern consistent with atrial fibrillation observed.  · Limited study; 4 chamber 2chamber long axis, 2 chamber long axis views not seen, suggest repeat study      CURRENT/PREVIOUS VISIT EKG  Results for orders placed or performed during the hospital encounter of 01/31/23   EKG 12-lead    Collection Time: 02/01/23  3:07 PM    Narrative    Test Reason : R00.1,    Vent. Rate : 045 BPM     Atrial Rate : 000 BPM     P-R Int : 000 ms          QRS Dur : 120 ms      QT Int : 446 ms       P-R-T Axes : 000 093 098 degrees     QTc Int :  385 ms    Atrial fibrillation with slow ventricular response  Rightward axis  Low voltage QRS  Septal infarct (cited on or before 31-JAN-2023)  Abnormal ECG  When compared with ECG of 31-JAN-2023 16:04,  Vent. rate has decreased BY  24 BPM  QT has shortened    Referred By: GINA   SELF           Confirmed By:      No valid procedures specified.   No results found for this or any previous visit.    No valid procedures specified.    PHYSICAL EXAM  CONSTITUTIONAL: Well built, well nourished pleasant female breathing comfortably in no apparent distress  NECK: no carotid bruit, no JVD  LUNGS: CTA, no crackles, rhonchi, wheezing, coughing  CHEST WALL: no tenderness  HEART: irregular rate and rhythm, S1, S2 normal, no murmur, click, rub or gallop   ABDOMEN: soft, non-tender; bowel sounds normal; no masses,  no organomegaly  EXTREMITIES: Extremities normal, no edema, no calf tenderness noted  NEURO: AAO X 3    I HAVE REVIEWED :    The vital signs, nurses notes, and all the pertinent radiology and labs.    Current Outpatient Medications   Medication Instructions    albuterol (PROVENTIL/VENTOLIN HFA) 90 mcg/actuation inhaler INHALE 2 PUFFS EVERY 6 HOURS AS NEEDED FOR WHEEZING  (RESCUE)    apixaban (ELIQUIS) 5 mg, Oral, 2 times daily    ascorbic acid (vitamin C) (VITAMIN C) 1,000 mg, Oral, Nightly    atorvastatin (LIPITOR) 10 MG tablet TAKE 1 TABLET(10 MG) BY MOUTH EVERY OTHER DAY    blood sugar diagnostic Strp To check BG 2 times daily, to use with insurance preferred meter    blood-glucose meter kit To check BG 2 times daily, to use with insurance preferred meter    empagliflozin (JARDIANCE) 10 mg, Oral, Daily    fluticasone propionate (FLONASE) 50 mcg/actuation nasal spray SHAKE LIQUID AND USE 1 SPRAY(50 MCG) IN EACH NOSTRIL EVERY DAY    fluticasone-umeclidin-vilanter (TRELEGY ELLIPTA) 100-62.5-25 mcg DsDv INHALE 1 PUFF EVERY DAY EXPIRES 42 DAYS AFTER OPENING FOIL TRAY    furosemide (LASIX) 40 MG tablet TAKE 1 TABLET(40  MG) BY MOUTH EVERY DAY    gabapentin (NEURONTIN) 800 mg, Oral, 3 times daily    [START ON 2/10/2023] HYDROcodone-acetaminophen (NORCO) 7.5-325 mg per tablet 1 tablet, Oral, Every 12 hours PRN, Medical necessary for greater than 7 days for chronic pain.    lancets Misc To check BG 2 times daily, to use with insurance preferred meter    LIDOcaine (LIDODERM) 5 % APPLY PATCH ONTO SKIN.REMOVE AND DISCARD PATCH WITHIN 12 HOURS OR AS DIRECTED BY MD    melatonin 10 mg, Oral, Nightly    metFORMIN (GLUCOPHAGE) 500 mg, Oral, With breakfast    multivitamin (THERAGRAN) tablet 1 tablet, Oral, Daily    nystatin (NYSTOP) powder APPLY TOPICALLY TWICE DAILY    nystatin-triamcinolone (MYCOLOG II) cream Topical (Top), 3 times daily, to affected area    omeprazole (PRILOSEC) 20 mg, Oral, Daily    ondansetron (ZOFRAN-ODT) 8 mg, Oral, Every 12 hours PRN    potassium chloride SA (K-DUR,KLOR-CON) 20 MEQ tablet 20 mEq, Oral, Daily    spironolactone (ALDACTONE) 50 mg, Oral, Daily        Assessment & Plan     Chronic atrial fibrillation  IRR in office today. Continue apixiban 5 mg BID   No beta blocker; history of HR dropping to 45. Currently 61 bpm    Hypertension associated with diabetes  Pt was changed to spironolactone 50 mg daily recently and denies any adverse reactions with this medication  Advised Low sodium diet  Patient is also doing mild stretching and exercising at home and was encouraged to continue to do the same    Hyperlipidemia associated with type 2 diabetes mellitus  LDL 41.6 on 3/25/23; Continue Lipitor 10 mg QPM  Repeat FLP next month with [facility]    PVD (peripheral vascular disease)  Continue Lipitor 10 mg p.o. q.h.s.    Chronic combined systolic and diastolic CHF (congestive heart failure)  Patient is identified as having combined systolic and diastolic heart failure that is chronic. CHF is currently controlled. Latest ECHO performed and demonstrates- Results for orders placed during the hospital encounter of  01/31/23    Echo Saline Bubble? No    Interpretation Summary  · Moderate left atrial enlargement.  · Mild pulmonic regurgitation.  · Mild mitral regurgitation.  · A diastolic pattern consistent with atrial fibrillation observed.  · Limited study; 4 chamber 2chamber long axis, 2 chamber long axis views not seen, suggest repeat study  . Continue Lasix and spironolactone and monitor clinical status closely.  Patient states she was on lisinopril could not tolerate this so it has been stopped and replaced with spironolactone Monitor strict Is&Os and daily weights.  Place on fluid restriction of 1.5 L Continue to stress to patient importance of self efficacy and  on diet for CHF. Last BNP reviewed- and noted below @LABRCNTIP(BNP,BNPTRIAGEBLO)@.      Long term current use of anticoagulant therapy  Patient denies any bleeding tendencies.  Continue apixaban 5 mg p.o. b.i.d. for thromboembolism prophylaxis in presence of atrial fib    Chest pain  Patient was unable to fit on the machines for stress testing.  She states that she is been home she is not had any recurrent chest pain discussed importance of blood pressure control and heart healthy eating.     Severe obesity (BMI 35.0-35.9 with comorbidity)  Heart healthy low carb diet recommended  Continue low-impact exercises as tolerated      Follow up in 6 months or sooner as needed

## 2023-02-08 NOTE — ASSESSMENT & PLAN NOTE
Patient denies any bleeding tendencies.  Continue apixaban 5 mg p.o. b.i.d. for thromboembolism prophylaxis in presence of atrial fib

## 2023-02-15 ENCOUNTER — OFFICE VISIT (OUTPATIENT)
Dept: OPTOMETRY | Facility: CLINIC | Age: 73
End: 2023-02-15
Payer: MEDICARE

## 2023-02-15 DIAGNOSIS — H33.022 RETINAL DETACHMENT OF LEFT EYE WITH MULTIPLE BREAKS: ICD-10-CM

## 2023-02-15 DIAGNOSIS — H35.342 MACULAR HOLE OF LEFT EYE: ICD-10-CM

## 2023-02-15 DIAGNOSIS — H52.7 REFRACTIVE ERROR: ICD-10-CM

## 2023-02-15 DIAGNOSIS — Z96.1 PSEUDOPHAKIA: ICD-10-CM

## 2023-02-15 DIAGNOSIS — H25.11 NUCLEAR SCLEROSIS OF RIGHT EYE: ICD-10-CM

## 2023-02-15 DIAGNOSIS — E11.9 DIABETES MELLITUS TYPE 2 WITHOUT RETINOPATHY: Primary | ICD-10-CM

## 2023-02-15 DIAGNOSIS — E11.36 DIABETIC CATARACT: ICD-10-CM

## 2023-02-15 DIAGNOSIS — H31.002 CHORIORETINAL SCAR OF LEFT EYE: ICD-10-CM

## 2023-02-15 DIAGNOSIS — H40.013 OPEN ANGLE WITH BORDERLINE FINDINGS OF BOTH EYES: ICD-10-CM

## 2023-02-15 PROCEDURE — 1126F AMNT PAIN NOTED NONE PRSNT: CPT | Mod: CPTII,S$GLB,, | Performed by: OPTOMETRIST

## 2023-02-15 PROCEDURE — 99999 PR PBB SHADOW E&M-EST. PATIENT-LVL IV: CPT | Mod: PBBFAC,,, | Performed by: OPTOMETRIST

## 2023-02-15 PROCEDURE — 1157F PR ADVANCE CARE PLAN OR EQUIV PRESENT IN MEDICAL RECORD: ICD-10-PCS | Mod: CPTII,S$GLB,, | Performed by: OPTOMETRIST

## 2023-02-15 PROCEDURE — 1101F PR PT FALLS ASSESS DOC 0-1 FALLS W/OUT INJ PAST YR: ICD-10-PCS | Mod: CPTII,S$GLB,, | Performed by: OPTOMETRIST

## 2023-02-15 PROCEDURE — 99204 OFFICE O/P NEW MOD 45 MIN: CPT | Mod: S$GLB,,, | Performed by: OPTOMETRIST

## 2023-02-15 PROCEDURE — 1126F PR PAIN SEVERITY QUANTIFIED, NO PAIN PRESENT: ICD-10-PCS | Mod: CPTII,S$GLB,, | Performed by: OPTOMETRIST

## 2023-02-15 PROCEDURE — 1160F RVW MEDS BY RX/DR IN RCRD: CPT | Mod: CPTII,S$GLB,, | Performed by: OPTOMETRIST

## 2023-02-15 PROCEDURE — 3288F FALL RISK ASSESSMENT DOCD: CPT | Mod: CPTII,S$GLB,, | Performed by: OPTOMETRIST

## 2023-02-15 PROCEDURE — 1157F ADVNC CARE PLAN IN RCRD: CPT | Mod: CPTII,S$GLB,, | Performed by: OPTOMETRIST

## 2023-02-15 PROCEDURE — 99999 PR PBB SHADOW E&M-EST. PATIENT-LVL IV: ICD-10-PCS | Mod: PBBFAC,,, | Performed by: OPTOMETRIST

## 2023-02-15 PROCEDURE — 1160F PR REVIEW ALL MEDS BY PRESCRIBER/CLIN PHARMACIST DOCUMENTED: ICD-10-PCS | Mod: CPTII,S$GLB,, | Performed by: OPTOMETRIST

## 2023-02-15 PROCEDURE — 1159F PR MEDICATION LIST DOCUMENTED IN MEDICAL RECORD: ICD-10-PCS | Mod: CPTII,S$GLB,, | Performed by: OPTOMETRIST

## 2023-02-15 PROCEDURE — 1159F MED LIST DOCD IN RCRD: CPT | Mod: CPTII,S$GLB,, | Performed by: OPTOMETRIST

## 2023-02-15 PROCEDURE — 92015 PR REFRACTION: ICD-10-PCS | Mod: S$GLB,,, | Performed by: OPTOMETRIST

## 2023-02-15 PROCEDURE — 92015 DETERMINE REFRACTIVE STATE: CPT | Mod: S$GLB,,, | Performed by: OPTOMETRIST

## 2023-02-15 PROCEDURE — 3288F PR FALLS RISK ASSESSMENT DOCUMENTED: ICD-10-PCS | Mod: CPTII,S$GLB,, | Performed by: OPTOMETRIST

## 2023-02-15 PROCEDURE — 99204 PR OFFICE/OUTPT VISIT, NEW, LEVL IV, 45-59 MIN: ICD-10-PCS | Mod: S$GLB,,, | Performed by: OPTOMETRIST

## 2023-02-15 PROCEDURE — 1101F PT FALLS ASSESS-DOCD LE1/YR: CPT | Mod: CPTII,S$GLB,, | Performed by: OPTOMETRIST

## 2023-02-15 NOTE — PROGRESS NOTES
HPI    Pt here today for annual DM exam.   States blurred vision at both near &   distance and frames broken.   Pt would like updated rx today.    Denies any headaches or eye pain.    Hx of RD w/ buckle - OS.    Hemoglobin A1C       Date                     Value               Ref Range             Status                12/05/2022               5.5                 4.0 - 5.6 %           Final                 07/22/2022               5.1                 4.0 - 5.6 %           Final                 03/09/2022               5.1                 4.0 - 5.6 %           Final            BSL controlled    (-) allergies / dry eyes  (-) gtts  (-) floaters or light flashes       Last edited by Perla Oshea on 2/15/2023  2:57 PM.            Assessment /Plan     For exam results, see Encounter Report.    Diabetes mellitus type 2 without retinopathy    Diabetic cataract    Nuclear sclerosis of right eye  -     Ambulatory referral/consult to Ophthalmology; Future; Expected date: 03/15/2023    Pseudophakia    Refractive error    Macular hole of left eye    Retinal detachment of left eye with multiple breaks    Chorioretinal scar of left eye    Open angle with borderline findings of both eyes      1. Diabetes mellitus type 2 without retinopathy  Discussed possible ocular affects of uncontrolled blood sugar with patient. Recommended continued strong blood sugar control and continued care with PCP. Monitor yearly.     2. Diabetic cataract  3. Nuclear sclerosis of right eye  Visually significant -- referred to Dr. Bautista for eval    - Ambulatory referral/consult to Ophthalmology; Future    4. Pseudophakia  S/p cataract extraction OS, open posterior capsule    5. Refractive error  Dispensed spec rx - understands will change s/p cataract extraction  Demonstrated new spec Rx vs current specs in phoropter with patient satisfaction    6. Macular hole of left eye  Longstanding? Pt notes decreased vision for 3+ years  Not interested in  surgery, declines retinal consult    7. Retinal detachment of left eye with multiple breaks  8. Chorioretinal scar of left eye  S/p RD repair  + scleral buckle    9. Open angle with borderline findings of both eyes  Pallor ONH OS  Thin rim, excavated compared to previous notes  ? Pachy  IOP within normal range  Refer to Dr. Bautista for eval

## 2023-02-16 ENCOUNTER — PATIENT OUTREACH (OUTPATIENT)
Dept: ADMINISTRATIVE | Facility: HOSPITAL | Age: 73
End: 2023-02-16
Payer: MEDICARE

## 2023-02-16 DIAGNOSIS — Z12.11 SPECIAL SCREENING FOR MALIGNANT NEOPLASMS, COLON: Primary | ICD-10-CM

## 2023-02-17 ENCOUNTER — PATIENT OUTREACH (OUTPATIENT)
Dept: ADMINISTRATIVE | Facility: HOSPITAL | Age: 73
End: 2023-02-17
Payer: MEDICARE

## 2023-02-17 DIAGNOSIS — Z12.11 SCREEN FOR COLON CANCER: Primary | ICD-10-CM

## 2023-02-17 NOTE — PROGRESS NOTES
Received message via ARIE in box that patient would like to schedule colonoscopy.   Case request entered for colonoscopy

## 2023-02-20 ENCOUNTER — PATIENT MESSAGE (OUTPATIENT)
Dept: GASTROENTEROLOGY | Facility: CLINIC | Age: 73
End: 2023-02-20
Payer: MEDICARE

## 2023-03-06 ENCOUNTER — PATIENT MESSAGE (OUTPATIENT)
Dept: FAMILY MEDICINE | Facility: CLINIC | Age: 73
End: 2023-03-06
Payer: MEDICARE

## 2023-03-08 NOTE — ASSESSMENT & PLAN NOTE
As a follow-up, a second attempt has been made for outreach via fax to facility  Please see Contacts section for details      Thank you  Conchita Felipe Continue home statin

## 2023-03-29 DIAGNOSIS — E11.9 TYPE 2 DIABETES MELLITUS WITHOUT COMPLICATION: ICD-10-CM

## 2023-04-03 ENCOUNTER — PATIENT MESSAGE (OUTPATIENT)
Dept: ADMINISTRATIVE | Facility: HOSPITAL | Age: 73
End: 2023-04-03
Payer: MEDICARE

## 2023-04-04 DIAGNOSIS — M51.36 LUMBAR DEGENERATIVE DISC DISEASE: ICD-10-CM

## 2023-04-04 DIAGNOSIS — Z51.81 THERAPEUTIC DRUG MONITORING: ICD-10-CM

## 2023-04-04 NOTE — TELEPHONE ENCOUNTER
No new care gaps identified.  [facility] Embedded Care Gaps. Reference number: [id number]. 4/04/2023   10:10:59 AM SKIPT

## 2023-04-05 RX ORDER — HYDROCODONE BITARTRATE AND ACETAMINOPHEN 7.5; 325 MG/1; MG/1
1 TABLET ORAL NIGHTLY PRN
Qty: 30 TABLET | Refills: 0 | Status: SHIPPED | OUTPATIENT
Start: 2023-04-05 | End: 2023-04-18

## 2023-04-11 ENCOUNTER — PATIENT MESSAGE (OUTPATIENT)
Dept: ADMINISTRATIVE | Facility: HOSPITAL | Age: 73
End: 2023-04-11
Payer: MEDICARE

## 2023-04-18 ENCOUNTER — OFFICE VISIT (OUTPATIENT)
Dept: FAMILY MEDICINE | Facility: CLINIC | Age: 73
End: 2023-04-18
Payer: MEDICARE

## 2023-04-18 VITALS
SYSTOLIC BLOOD PRESSURE: 118 MMHG | TEMPERATURE: 98 F | WEIGHT: 239.88 LBS | HEART RATE: 74 BPM | BODY MASS INDEX: 36.36 KG/M2 | DIASTOLIC BLOOD PRESSURE: 68 MMHG | HEIGHT: 68 IN

## 2023-04-18 DIAGNOSIS — Z12.31 OTHER SCREENING MAMMOGRAM: ICD-10-CM

## 2023-04-18 DIAGNOSIS — M51.36 LUMBAR DEGENERATIVE DISC DISEASE: ICD-10-CM

## 2023-04-18 DIAGNOSIS — R10.32 LEFT LOWER QUADRANT PAIN: Primary | ICD-10-CM

## 2023-04-18 DIAGNOSIS — Z51.81 THERAPEUTIC DRUG MONITORING: ICD-10-CM

## 2023-04-18 DIAGNOSIS — I50.30 DIASTOLIC CHF WITH PRESERVED LEFT VENTRICULAR FUNCTION, NYHA CLASS 2: ICD-10-CM

## 2023-04-18 DIAGNOSIS — E11.51 TYPE 2 DIABETES MELLITUS WITH DIABETIC PERIPHERAL ANGIOPATHY WITHOUT GANGRENE, WITHOUT LONG-TERM CURRENT USE OF INSULIN: ICD-10-CM

## 2023-04-18 PROCEDURE — 3078F DIAST BP <80 MM HG: CPT | Mod: CPTII,S$GLB,, | Performed by: FAMILY MEDICINE

## 2023-04-18 PROCEDURE — 99214 OFFICE O/P EST MOD 30 MIN: CPT | Mod: S$GLB,,, | Performed by: FAMILY MEDICINE

## 2023-04-18 PROCEDURE — 1157F ADVNC CARE PLAN IN RCRD: CPT | Mod: CPTII,S$GLB,, | Performed by: FAMILY MEDICINE

## 2023-04-18 PROCEDURE — 3078F PR MOST RECENT DIASTOLIC BLOOD PRESSURE < 80 MM HG: ICD-10-PCS | Mod: CPTII,S$GLB,, | Performed by: FAMILY MEDICINE

## 2023-04-18 PROCEDURE — 3288F FALL RISK ASSESSMENT DOCD: CPT | Mod: CPTII,S$GLB,, | Performed by: FAMILY MEDICINE

## 2023-04-18 PROCEDURE — 3288F PR FALLS RISK ASSESSMENT DOCUMENTED: ICD-10-PCS | Mod: CPTII,S$GLB,, | Performed by: FAMILY MEDICINE

## 2023-04-18 PROCEDURE — 1126F AMNT PAIN NOTED NONE PRSNT: CPT | Mod: CPTII,S$GLB,, | Performed by: FAMILY MEDICINE

## 2023-04-18 PROCEDURE — 3074F PR MOST RECENT SYSTOLIC BLOOD PRESSURE < 130 MM HG: ICD-10-PCS | Mod: CPTII,S$GLB,, | Performed by: FAMILY MEDICINE

## 2023-04-18 PROCEDURE — 3008F BODY MASS INDEX DOCD: CPT | Mod: CPTII,S$GLB,, | Performed by: FAMILY MEDICINE

## 2023-04-18 PROCEDURE — 99999 PR PBB SHADOW E&M-EST. PATIENT-LVL III: CPT | Mod: PBBFAC,,, | Performed by: FAMILY MEDICINE

## 2023-04-18 PROCEDURE — 4010F PR ACE/ARB THEARPY RXD/TAKEN: ICD-10-PCS | Mod: CPTII,S$GLB,, | Performed by: FAMILY MEDICINE

## 2023-04-18 PROCEDURE — 3074F SYST BP LT 130 MM HG: CPT | Mod: CPTII,S$GLB,, | Performed by: FAMILY MEDICINE

## 2023-04-18 PROCEDURE — 1101F PT FALLS ASSESS-DOCD LE1/YR: CPT | Mod: CPTII,S$GLB,, | Performed by: FAMILY MEDICINE

## 2023-04-18 PROCEDURE — 1159F PR MEDICATION LIST DOCUMENTED IN MEDICAL RECORD: ICD-10-PCS | Mod: CPTII,S$GLB,, | Performed by: FAMILY MEDICINE

## 2023-04-18 PROCEDURE — 99214 PR OFFICE/OUTPT VISIT, EST, LEVL IV, 30-39 MIN: ICD-10-PCS | Mod: S$GLB,,, | Performed by: FAMILY MEDICINE

## 2023-04-18 PROCEDURE — 1157F PR ADVANCE CARE PLAN OR EQUIV PRESENT IN MEDICAL RECORD: ICD-10-PCS | Mod: CPTII,S$GLB,, | Performed by: FAMILY MEDICINE

## 2023-04-18 PROCEDURE — 1101F PR PT FALLS ASSESS DOC 0-1 FALLS W/OUT INJ PAST YR: ICD-10-PCS | Mod: CPTII,S$GLB,, | Performed by: FAMILY MEDICINE

## 2023-04-18 PROCEDURE — 1126F PR PAIN SEVERITY QUANTIFIED, NO PAIN PRESENT: ICD-10-PCS | Mod: CPTII,S$GLB,, | Performed by: FAMILY MEDICINE

## 2023-04-18 PROCEDURE — 3008F PR BODY MASS INDEX (BMI) DOCUMENTED: ICD-10-PCS | Mod: CPTII,S$GLB,, | Performed by: FAMILY MEDICINE

## 2023-04-18 PROCEDURE — 99999 PR PBB SHADOW E&M-EST. PATIENT-LVL III: ICD-10-PCS | Mod: PBBFAC,,, | Performed by: FAMILY MEDICINE

## 2023-04-18 PROCEDURE — 4010F ACE/ARB THERAPY RXD/TAKEN: CPT | Mod: CPTII,S$GLB,, | Performed by: FAMILY MEDICINE

## 2023-04-18 PROCEDURE — 1159F MED LIST DOCD IN RCRD: CPT | Mod: CPTII,S$GLB,, | Performed by: FAMILY MEDICINE

## 2023-04-18 RX ORDER — SPIRONOLACTONE 25 MG/1
25 TABLET ORAL DAILY
Qty: 90 TABLET | Refills: 4 | Status: SHIPPED | OUTPATIENT
Start: 2023-04-18 | End: 2024-02-10 | Stop reason: SDUPTHER

## 2023-04-18 RX ORDER — HYDROCODONE BITARTRATE AND ACETAMINOPHEN 7.5; 325 MG/1; MG/1
1 TABLET ORAL EVERY 12 HOURS PRN
Qty: 60 TABLET | Refills: 0 | Status: SHIPPED | OUTPATIENT
Start: 2023-06-18 | End: 2023-07-31 | Stop reason: SDUPTHER

## 2023-04-18 RX ORDER — HYDROCODONE BITARTRATE AND ACETAMINOPHEN 7.5; 325 MG/1; MG/1
1 TABLET ORAL EVERY 12 HOURS PRN
Qty: 60 TABLET | Refills: 0 | Status: SHIPPED | OUTPATIENT
Start: 2023-04-18 | End: 2023-05-18

## 2023-04-18 RX ORDER — HYDROCODONE BITARTRATE AND ACETAMINOPHEN 7.5; 325 MG/1; MG/1
1 TABLET ORAL EVERY 12 HOURS PRN
Qty: 60 TABLET | Refills: 0 | Status: SHIPPED | OUTPATIENT
Start: 2023-05-18 | End: 2023-06-17

## 2023-04-18 RX ORDER — POTASSIUM CHLORIDE 20 MEQ/1
20 TABLET, EXTENDED RELEASE ORAL DAILY
Qty: 90 TABLET | Refills: 3 | Status: SHIPPED | OUTPATIENT
Start: 2023-04-18

## 2023-04-18 NOTE — PATIENT INSTRUCTIONS
DASH diet for Hypertension and Healthy Eating  Provided by the [facility]    Healthy Lifestyle   Nutrition and healthy eating  The DASH diet emphasizes portion size, eating a variety of foods and getting the right amount of nutrients. Discover how DASH can improve your health and lower your blood pressure.  By [facility] Staff   DASH stands for Dietary Approaches to Stop Hypertension. The DASH diet is a lifelong approach to healthy eating that's designed to help treat or prevent high blood pressure (hypertension). The DASH diet encourages you to reduce the sodium in your diet and eat a variety of foods rich in nutrients that help lower blood pressure, such as potassium, calcium and magnesium.  By following the DASH diet, you may be able to reduce your blood pressure by a few points in just two weeks. Over time, your systolic blood pressure could drop by eight to 14 points, which can make a significant difference in your health risks.  Because the DASH diet is a healthy way of eating, it offers health benefits besides just lowering blood pressure. The DASH diet is also in line with dietary recommendations to prevent osteoporosis, cancer, heart disease, stroke and diabetes.  The DASH diet emphasizes vegetables, fruits and low-fat dairy foods -- and moderate amounts of whole grains, fish, poultry and nuts.  In addition to the standard DASH diet, there is also a lower sodium version of the diet. You can choose the version of the diet that meets your health needs:  Standard DASH diet. You can consume up to 2,300 milligrams (mg) of sodium a day.   Lower sodium DASH diet. You can consume up to 1,500 mg of sodium a day.  Both versions of the DASH diet aim to reduce the amount of sodium in your diet compared with what you might get in a typical [place] diet, which can amount to a whopping 3,400 mg of sodium a day or more.  The standard DASH diet meets the recommendation from the Dietary Guidelines for Americans to keep  "daily sodium intake to less than 2,300 mg a day.  The American Heart Association recommends 1,500 mg a day of sodium as an upper limit for all adults. If you aren't sure what sodium level is right for you, talk to your doctor.  Both versions of the DASH diet include lots of whole grains, fruits, vegetables and low-fat dairy products. The DASH diet also includes some fish, poultry and legumes, and encourages a small amount of nuts and seeds a few times a week.   You can eat red meat, sweets and fats in small amounts. The DASH diet is low in saturated fat, cholesterol and total fat.  Here's a look at the recommended servings from each food group for the 2,[id number]-day DASH diet.  Grains: 6 to 8 servings a day  Grains include bread, cereal, rice and pasta. Examples of one serving of grains include 1 slice whole-wheat bread, 1 ounce dry cereal, or 1/2 cup cooked cereal, rice or pasta.  Focus on whole grains because they have more fiber and nutrients than do refined grains. For instance, use brown rice instead of white rice, whole-wheat pasta instead of regular pasta and whole-grain bread instead of white bread. Look for products labeled "100 percent whole grain" or "100 percent whole wheat."   Grains are naturally low in fat. Keep them this way by avoiding butter, cream and cheese sauces.  Vegetables: 4 to 5 servings a day  Tomatoes, carrots, broccoli, sweet potatoes, greens and other vegetables are full of fiber, vitamins, and such minerals as potassium and magnesium. Examples of one serving include 1 cup raw leafy green vegetables or 1/2 cup cut-up raw or cooked vegetables.  Don't think of vegetables only as side dishes -- a hearty blend of vegetables served over brown rice or whole-wheat noodles can serve as the main dish for a meal.   Fresh and frozen vegetables are both good choices. When buying frozen and canned vegetables, choose those labeled as low sodium or without added salt.   To increase the number of " servings you fit in daily, be creative. In a stir-curry, for instance, cut the amount of meat in half and double up on the vegetables.  Fruits: 4 to 5 servings a day  Many fruits need little preparation to become a healthy part of a meal or snack. Like vegetables, they're packed with fiber, potassium and magnesium and are typically low in fat -- coconuts are an exception. Examples of one serving include one medium fruit, 1/2 cup fresh, frozen or canned fruit, or 4 ounces of juice.  Have a piece of fruit with meals and one as a snack, then round out your day with a dessert of fresh fruits topped with a dollop of low-fat yogurt.   Leave on edible peels whenever possible. The peels of apples, pears and most fruits with pits add interesting texture to recipes and contain healthy nutrients and fiber.   Remember that citrus fruits and juices, such as grapefruit, can interact with certain medications, so check with your doctor or pharmacist to see if they're OK for you.   If you choose canned fruit or juice, make sure no sugar is added.  Dairy: 2 to 3 servings a day  Milk, yogurt, cheese and other dairy products are major sources of calcium, vitamin D and protein. But the key is to make sure that you choose dairy products that are low fat or fat-free because otherwise they can be a major source of fat -- and most of it is saturated. Examples of one serving include 1 cup skim or 1 percent milk, 1 cup low fat yogurt, or 1 1/2 ounces part-skim cheese.  Low-fat or fat-free frozen yogurt can help you boost the amount of dairy products you eat while offering a sweet treat. Add fruit for a healthy twist.   If you have trouble digesting dairy products, choose lactose-free products or consider taking an over-the-counter product that contains the enzyme lactase, which can reduce or prevent the symptoms of lactose intolerance.   Go easy on regular and even fat-free cheeses because they are typically high in sodium.  Lean meat, poultry  and fish: 6 servings or fewer a day  Meat can be a rich source of protein, B vitamins, iron and zinc. Choose lean varieties and aim for no more than 6 ounces a day. Cutting back on your meat portion will allow room for more vegetables.  Trim away skin and fat from poultry and meat and then bake, broil, grill or roast instead of frying in fat.   Eat heart-healthy fish, such as salmon, herring and tuna. These types of fish are high in omega-3 fatty acids, which can help lower your total cholesterol.  Nuts, seeds and legumes: 4 to 5 servings a week  Almonds, sunflower seeds, kidney beans, peas, lentils and other foods in this family are good sources of magnesium, potassium and protein. They're also full of fiber and phytochemicals, which are plant compounds that may protect against some cancers and cardiovascular disease.  Serving sizes are small and are intended to be consumed only a few times a week because these foods are high in calories. Examples of one serving include 1/3 cup nuts, 2 tablespoons seeds, or 1/2 cup cooked beans or peas.   Nuts sometimes get a bad rap because of their fat content, but they contain healthy types of fat -- monounsaturated fat and omega-3 fatty acids. They're high in calories, however, so eat them in moderation. Try adding them to stir-fries, salads or cereals.   Soybean-based products, such as tofu and tempeh, can be a good alternative to meat because they contain all of the amino acids your body needs to make a complete protein, just like meat.  Fats and oils: 2 to 3 servings a day  Fat helps your body absorb essential vitamins and helps your body's immune system. But too much fat increases your risk of heart disease, diabetes and obesity. The DASH diet strives for a healthy balance by limiting total fat to less than 30 percent of daily calories from fat, with a focus on the healthier monounsaturated fats.  Examples of one serving include 1 teaspoon soft margarine, 1 tablespoon  mayonnaise or 2 tablespoons salad dressing.  Saturated fat and trans fat are the main dietary culprits in increasing your risk of coronary artery disease. DASH helps keep your daily saturated fat to less than 6 percent of your total calories by limiting use of meat, butter, cheese, whole milk, cream and eggs in your diet, along with foods made from lard, solid shortenings, and palm and coconut oils.   Avoid trans fat, commonly found in such processed foods as crackers, baked goods and fried items.   Read food labels on margarine and salad dressing so that you can choose those that are lowest in saturated fat and free of trans fat.  Sweets: 5 servings or fewer a week  You don't have to banish sweets entirely while following the DASH diet -- just go easy on them. Examples of one serving include 1 tablespoon sugar, jelly or jam, 1/2 cup sorbet, or 1 cup lemonade.  When you eat sweets, choose those that are fat-free or low-fat, such as sorbets, fruit ices, jelly beans, hard candy, ana crackers or low-fat cookies.   Artificial sweeteners such as aspartame (NutraSweet, Equal) and sucralose (Splenda) may help satisfy your sweet tooth while sparing the sugar. But remember that you still must use them sensibly. It's OK to swap a diet cola for a regular cola, but not in place of a more nutritious beverage such as low-fat milk or even plain water.   Cut back on added sugar, which has no nutritional value but can pack on calories.  Drinking too much alcohol can increase blood pressure. The Dietary Guidelines for Americans recommends that men limit alcohol to no more than two drinks a day and women to one or less.  The DASH diet doesn't address caffeine consumption. The influence of caffeine on blood pressure remains unclear. But caffeine can cause your blood pressure to rise at least temporarily. If you already have high blood pressure or if you think caffeine is affecting your blood pressure, talk to your doctor about your  "caffeine consumption.  While the DASH diet is not a weight-loss program, you may indeed lose unwanted pounds because it can help guide you toward healthier food choices.  The DASH diet generally includes about 2,000 calories a day. If you're trying to lose weight, you may need to eat fewer calories. You may also need to adjust your serving goals based on your individual circumstances -- something your health care team can help you decide.  The foods at the core of the DASH diet are naturally low in sodium. So just by following the DASH diet, you're likely to reduce your sodium intake. You also reduce sodium further by:  Using sodium-free spices or flavorings with your food instead of salt   Not adding salt when cooking rice, pasta or hot cereal   Rinsing canned foods to remove some of the sodium   Buying foods labeled "no salt added," "sodium-free," "low sodium" or "very low sodium"  One teaspoon of table salt has 2,325 mg of sodium. When you read food labels, you may be surprised at just how much sodium some processed foods contain. Even low-fat soups, canned vegetables, ready-to-eat cereals and sliced turkey from the local deli -- foods you may have considered healthy -- often have lots of sodium.  You may notice a difference in taste when you choose low-sodium food and beverages. If things seem too bland, gradually introduce low-sodium foods and cut back on table salt until you reach your sodium goal. That'll give your palate time to adjust.  Using salt-free seasoning blends or herbs and spices may also ease the transition. It can take several weeks for your taste buds to get used to less salty foods.  Try these strategies to get started on the DASH diet:   Change gradually. If you now eat only one or two servings of fruits or vegetables a day, try to add a serving at lunch and one at dinner. Rather than switching to all whole grains, start by making one or two of your grain servings whole grains. Increasing " fruits, vegetables and whole grains gradually can also help prevent bloating or diarrhea that may occur if you aren't used to eating a diet with lots of fiber. You can also try over-the-counter products to help reduce gas from beans and vegetables.   Reward successes and forgive slip-ups. Reward yourself with a nonfood treat for your accomplishments -- rent a movie, purchase a book or get together with a friend. Everyone slips, especially when learning something new. Remember that changing your lifestyle is a long-term process. Find out what triggered your setback and then just  where you left off with the DASH diet.   Add physical activity. To boost your blood pressure lowering efforts even more, consider increasing your physical activity in addition to following the DASH diet. Combining both the DASH diet and physical activity makes it more likely that you'll reduce your blood pressure.   Get support if you need it. If you're having trouble sticking to your diet, talk to your doctor or dietitian about it. You might get some tips that will help you stick to the DASH diet.  Remember, healthy eating isn't an all-or-nothing proposition. What's most important is that, on average, you eat healthier foods with plenty of variety -- both to keep your diet nutritious and to avoid boredom or extremes. And with the DASH diet, you can have both.

## 2023-04-19 NOTE — PROGRESS NOTES
Subjective:       Patient ID: Nelly Tian is a 72 y.o. female.    Chief Complaint: No chief complaint on file.      Active Medical History:  SUBJECTIVE: Nelly Tian is a 72 y.o. female seen for a follow up visit; she has diabetes, hypertension, hyperlipidemia, peripheral vascular disease and obesity.  Endocrine ROS: positive for - malaise/lethargy, polydipsia/polyuria and skin changes  negative for - breast changes, hot flashes, mood swings or temperature intolerance  Diabetic Review of Systems - medication compliance: compliant most of the time, diabetic diet compliance: noncompliant some of the time, home glucose monitoring: is performed sporadically, further diabetic ROS: no chest pain, dyspnea or TIA's, no numbness, tingling or pain in extremities, no hypoglycemia, no medication side effects noted, weight has increased, has dysesthesias in the feet, last eye exam approximately less than 1 yr ago.  The patient complains of chronic fatigue with reduce EF 40%.  Chronic back pain with multiple-level degenerative joint disease .  Lumbar radiculopathy, bladder incontinence.   Current Outpatient Medications:  albuterol (ACCUNEB) 0.63 mg/3 mL Nebu, Take 3 mLs (0.63 mg total) by nebulization every 6 (six) hours as needed. Rescue, Disp: 75 mL, Rfl: 11  albuterol (PROVENTIL/VENTOLIN HFA) 90 mcg/actuation inhaler, Inhale 2 puffs into the lungs every 6 (six) hours as needed for Wheezing. Rescue, Disp: 54 g, Rfl: 3  apixaban (ELIQUIS) 5 mg Tab, Take 1 tablet (5 mg total) by mouth 2 (two) times daily., Disp: 180 tablet, Rfl: 3  ascorbic acid, vitamin C, (VITAMIN C) 500 MG tablet, Take 2 tablets (1,000 mg total) by mouth every evening., Disp: , Rfl:   atorvastatin (LIPITOR) 10 MG tablet, Take 1 tablet (10 mg total) by mouth every other day., Disp: 35 tablet, Rfl: 3  BREO ELLIPTA 100-25 mcg/dose diskus inhaler, INHALE 1 PUFF EVERY DAY, Disp: 60 each, Rfl: 1  budesonide (PULMICORT) 0.5 mg/2 mL nebulizer  solution, INHALE THE CONTENTS OF 1 VIAL VIA NEBULIZER EVERY DAY, Disp: 180 mL, Rfl: 0  clonazePAM (KLONOPIN) 0.5 MG tablet, Take 1 tablet (0.5 mg total) by mouth 2 (two) times daily as needed for Anxiety., Disp: 60 tablet, Rfl: 2  gabapentin (NEURONTIN) 600 MG tablet, Take 1 tablet (600 mg total) by mouth 3 (three) times daily., Disp: 270 tablet, Rfl: 3  HYDROcodone-acetaminophen (NORCO) 7.5-325 mg per tablet, Take 1 tablet by mouth nightly as needed for Pain. Medical necessary for greater than 7 days for chronic pain. Hydrocodone is contraindicated with Klonopin., Disp: 60 tablet, Rfl: 0  [START ON 11/6/2021] HYDROcodone-acetaminophen (NORCO) 7.5-325 mg per tablet, Take 1 tablet by mouth every 12 (twelve) hours as needed for Pain. Medical necessary for greater than 7 days for chronic pain. Hydrocodone is contraindicated with Klonopin., Disp: 60 tablet, Rfl: 0  levalbuterol (XOPENEX) 0.63 mg/3 mL nebulizer solution, INHALE THE CONTENTS OF 1 VIAL BY NEBULIZATION 4 times  DAILY.    DX: J43.9, Disp: 792 mL, Rfl: 3  LIDOcaine (LIDODERM) 5 %, Remove & Discard patch within 12 hours or as directed by MD, Disp: 60 patch, Rfl: 3  lisinopriL (PRINIVIL,ZESTRIL) 20 MG tablet, TAKE 1 TABLET TWICE DAILY, Disp: 180 tablet, Rfl: 4  metFORMIN (GLUCOPHAGE) 500 MG tablet, TAKE 1 TABLET (500 MG TOTAL) BY MOUTH DAILY WITH BREAKFAST., Disp: 90 tablet, Rfl: 3  multivitamin (THERAGRAN) tablet, Take 1 tablet by mouth once daily., Disp: , Rfl:   nystatin (NYSTOP) powder, Apply topically 2 (two) times daily., Disp: 120 g, Rfl: 11  nystatin-triamcinolone (MYCOLOG II) cream, Apply topically 3 (three) times daily. to affected area, Disp: 30 g, Rfl: 3  omeprazole (PRILOSEC) 20 MG capsule, TAKE 1 CAPSULE TWICE DAILY  BEFORE  MEALS, Disp: 180 capsule, Rfl: 3  ondansetron (ZOFRAN-ODT) 8 MG TbDL, DISSOLVE 1 TABLET ON THE TONGUE EVERY 8 HOURS AS NEEDED, Disp: 40 tablet, Rfl: 1  potassium chloride SA (K-DUR,KLOR-CON) 20 MEQ tablet, TAKE 1 TABLET(20  MEQ) BY MOUTH EVERY DAY, Disp: 30 tablet, Rfl: 11  TRELEGY ELLIPTA 100-62.5-25 mcg DsDv, INHALE 1 PUFF INTO THE LUNGS ONE TIME DAILY, Disp: 180 each, Rfl: 3  fluticasone propionate (FLONASE) 50 mcg/actuation nasal spray, SHAKE LIQUID AND USE 1 SPRAY(50 MCG) IN EACH NOSTRIL EVERY DAY, Disp: 16 g, Rfl: 11  furosemide (LASIX) 40 MG tablet, TAKE 1 TABLET(40 MG) BY MOUTH EVERY DAY, Disp: 90 tablet, Rfl: 3  semaglutide (OZEMPIC) 0.25 mg or 0.5 mg(2 mg/1.5 mL) pen injector, Start 0.25 mg weekly x 2 weeks then 0.5 mg weekly therafter, Disp: 4 pen, Rfl: 11    No current facility-administered medications for this visit.    Patient Active Problem List:     Severe obesity (BMI 35.0-35.9 with comorbidity)     Diabetes mellitus with neuropathy     Long term current use of anticoagulant therapy     Major depression, recurrent, chronic     GERD (gastroesophageal reflux disease)     Tobacco dependency     Chronic atrial fibrillation     Hypertension associated with diabetes     PVD (peripheral vascular disease)     Abdominal aortic atherosclerosis     Dependency on pain medication     Iron deficiency anemia     DDD (degenerative disc disease), lumbar     Type 2 diabetes mellitus with diabetic neuropathy, without long-term current use of insulin     Hyperlipidemia associated with type 2 diabetes mellitus     NSAID long-term use     Mixed restrictive and obstructive lung disease     Hypercapnic respiratory failure, chronic     COPD (chronic obstructive pulmonary disease)     Hepatomegaly     Chronic combined systolic and diastolic CHF (congestive heart failure)     Decubitus ulcer     Type 2 diabetes mellitus, without long-term current use of insulin     Retinal detachment of left eye with multiple breaks     Chronic pain syndrome     Other nonthrombocytopenic purpura     Encephalopathy, metabolic     Chronically on benzodiazepine therapy     Chronic prescription opiate use     Flank hernia     Iron deficiency anemia due to  sideropenic dysphagia     Pressure ulcer of other site, stage 3/resolved        Diabetes  She presents for her follow-up diabetic visit. She has type 2 diabetes mellitus. Onset time: several. Her disease course has been improving. Hypoglycemia symptoms include nervousness/anxiousness. Pertinent negatives for hypoglycemia include no confusion, mood changes, pallor or tremors. Associated symptoms include fatigue, foot paresthesias and visual change. Pertinent negatives for diabetes include no blurred vision, no polydipsia, no polyuria and no weakness. There are no hypoglycemic complications. Symptoms are improving. Diabetic complications include autonomic neuropathy, nephropathy, peripheral neuropathy and PVD. Risk factors for coronary artery disease include diabetes mellitus, hypertension, post-menopausal, sedentary lifestyle, stress and tobacco exposure. Current diabetic treatment includes oral agent (dual therapy) and insulin injections. She is compliant with treatment most of the time. Her home blood glucose trend is fluctuating minimally. Her breakfast blood glucose is taken between 9-10 am. Her breakfast blood glucose range is generally 130-140 mg/dl.   Review of Systems   Constitutional:  Positive for fatigue. Negative for appetite change and unexpected weight change.   HENT:  Negative for congestion, hearing loss and sinus pain.    Eyes:  Positive for visual disturbance. Negative for blurred vision.   Respiratory:  Positive for cough, chest tightness and shortness of breath. Negative for wheezing.    Cardiovascular:  Negative for palpitations and leg swelling.   Gastrointestinal:  Positive for abdominal distention. Negative for nausea.   Endocrine: Negative for polydipsia and polyuria.   Genitourinary:  Positive for urgency. Negative for frequency.   Musculoskeletal:  Positive for arthralgias, back pain, gait problem, joint swelling, myalgias and neck stiffness.   Skin:  Positive for rash. Negative for pallor.    Allergic/Immunologic: Negative for environmental allergies.   Neurological:  Negative for tremors, weakness and light-headedness.   Psychiatric/Behavioral:  Positive for behavioral problems. Negative for confusion and decreased concentration. The patient is nervous/anxious.      Patient Active Problem List   Diagnosis    Severe obesity (BMI 35.0-35.9 with comorbidity)    Long term current use of anticoagulant therapy    Major depression, recurrent, chronic    GERD (gastroesophageal reflux disease)    Personal history of tobacco use    Chronic atrial fibrillation    Hypertension associated with diabetes    PVD (peripheral vascular disease)    Abdominal aortic atherosclerosis    Iron deficiency anemia    DDD (degenerative disc disease), lumbar    Type 2 diabetes mellitus with diabetic neuropathy, without long-term current use of insulin    Hyperlipidemia associated with type 2 diabetes mellitus    ACP (advance care planning)    Mixed restrictive and obstructive lung disease    Hypercapnic respiratory failure, chronic    COPD (chronic obstructive pulmonary disease)    Acute on chronic respiratory failure with hypoxia    Hyperkalemia    Hepatomegaly    Cellulitis    Chronic combined systolic and diastolic CHF (congestive heart failure)    Retinal detachment of left eye with multiple breaks    Chronic pain syndrome    Cellulitis of abdominal wall    Encephalopathy, metabolic    Uncomplicated opioid dependence    Flank hernia    Iron deficiency anemia due to sideropenic dysphagia       Objective:      Physical Exam  Vitals and nursing note reviewed.   Constitutional:       Appearance: She is well-developed. She is obese. She is ill-appearing.   HENT:      Right Ear: Decreased hearing noted.      Left Ear: Decreased hearing noted.   Cardiovascular:      Rate and Rhythm: Normal rate and regular rhythm.      Pulses:           Dorsalis pedis pulses are 3+ on the right side and 3+ on the left side.        Posterior tibial  pulses are 3+ on the right side and 3+ on the left side.      Heart sounds: Normal heart sounds.   Pulmonary:      Effort: Pulmonary effort is normal.      Breath sounds: Normal breath sounds.   Abdominal:      General: Abdomen is protuberant. Bowel sounds are normal.      Palpations: There is hepatomegaly and mass.      Tenderness: There is generalized abdominal tenderness.      Hernia: A hernia is present. Hernia is present in the ventral area.          Comments: Huge hernia , over 32 inches long with external bowel sounds of small bowels and large bowels.  Palpable spleen 8 cm   Musculoskeletal:      Lumbar back: Spasms and tenderness present. Decreased range of motion.      Comments: Patient has moderate lumbar pain.  The pain he has been localized to the lumbar area and has a radiated to both hips.  Will results with present medications.  Patient complains difficult to walk and ambulate.   Feet:      Right foot:      Protective Sensation: 6 sites tested.  6 sites sensed.      Skin integrity: No ulcer, blister or skin breakdown.      Left foot:      Protective Sensation: 6 sites tested.  6 sites sensed.      Skin integrity: No ulcer, blister or skin breakdown.   Skin:     General: Skin is warm and dry.   Neurological:      Mental Status: She is alert and oriented to person, place, and time.   Psychiatric:         Attention and Perception: Attention normal.         Mood and Affect: Mood normal. Affect is labile.         Speech: Speech normal.         Cognition and Memory: Cognition and memory normal.         Judgment: Judgment normal.       Lab Results   Component Value Date    WBC 7.92 02/01/2023    HGB 14.1 02/01/2023    HCT 44.2 02/01/2023     02/01/2023    CHOL 89 (L) 03/25/2022    TRIG 52 03/25/2022    HDL 37 (L) 03/25/2022    ALT 24 01/31/2023    AST 31 01/31/2023     02/01/2023    K 3.6 02/01/2023     02/01/2023    CREATININE 0.7 02/01/2023    BUN 26 (H) 02/01/2023    CO2 28 02/01/2023     TSH 1.309 01/11/2021    INR 1.0 06/27/2019    HGBA1C 5.5 12/05/2022     The ASCVD Risk score (Deonte DK, et al., 2019) failed to calculate for the following reasons:    The valid total cholesterol range is 130 to 320 mg/dL    Assessment:       1. Left lower quadrant pain    2. Type 2 diabetes mellitus with diabetic peripheral angiopathy without gangrene, without long-term current use of insulin    3. Lumbar degenerative disc disease    4. Therapeutic drug monitoring    5. Diastolic CHF with preserved left ventricular function, NYHA class 2    6. Other screening mammogram        Plan:       Left lower quadrant pain  -     CT Abdomen Pelvis  Without Contrast; Future; Expected date: 04/18/2023    Type 2 diabetes mellitus with diabetic peripheral angiopathy without gangrene, without long-term current use of insulin  -     empagliflozin (JARDIANCE) 25 mg tablet; Take 1 tablet (25 mg total) by mouth once daily.  Dispense: 90 tablet; Refill: 4  -     Comprehensive metabolic panel; Future; Expected date: 04/18/2023  -     Hemoglobin A1c; Future; Expected date: 04/18/2023    Lumbar degenerative disc disease  -     HYDROcodone-acetaminophen (NORCO) 7.5-325 mg per tablet; Take 1 tablet by mouth every 12 (twelve) hours as needed for Pain. Medical necessary for greater than 7 days for chronic pain.  Dispense: 60 tablet; Refill: 0  -     HYDROcodone-acetaminophen (NORCO) 7.5-325 mg per tablet; Take 1 tablet by mouth every 12 (twelve) hours as needed for Pain. Medical necessary for greater than 7 days for chronic pain.  Dispense: 60 tablet; Refill: 0  -     HYDROcodone-acetaminophen (NORCO) 7.5-325 mg per tablet; Take 1 tablet by mouth every 12 (twelve) hours as needed for Pain. Medical necessary for greater than 7 days for chronic pain.  Dispense: 60 tablet; Refill: 0    Therapeutic drug monitoring  -     HYDROcodone-acetaminophen (NORCO) 7.5-325 mg per tablet; Take 1 tablet by mouth every 12 (twelve) hours as needed for Pain.  Medical necessary for greater than 7 days for chronic pain.  Dispense: 60 tablet; Refill: 0  -     HYDROcodone-acetaminophen (NORCO) 7.5-325 mg per tablet; Take 1 tablet by mouth every 12 (twelve) hours as needed for Pain. Medical necessary for greater than 7 days for chronic pain.  Dispense: 60 tablet; Refill: 0  -     HYDROcodone-acetaminophen (NORCO) 7.5-325 mg per tablet; Take 1 tablet by mouth every 12 (twelve) hours as needed for Pain. Medical necessary for greater than 7 days for chronic pain.  Dispense: 60 tablet; Refill: 0    Diastolic CHF with preserved left ventricular function, NYHA class 2  -     spironolactone (ALDACTONE) 25 MG tablet; Take 1 tablet (25 mg total) by mouth once daily.  Dispense: 90 tablet; Refill: 4    Other screening mammogram    Other orders  -     potassium chloride SA (K-DUR,KLOR-CON) 20 MEQ tablet; Take 1 tablet (20 mEq total) by mouth once daily.  Dispense: 90 tablet; Refill: 3      Lab Results   Component Value Date    HGBA1C 5.5 12/05/2022     DM.The current medical regimen is effective;  continue present plan and medications.  Ht.Stable and controlled. Continue current treatment plan as previously prescribed  Patient readiness: acceptance and barriers:readiness, social stressors and environmental  I have checked the patient's  prior to prescribing opioids.  I have recommended that this patient have a immunization for Covid and smoking cessation but she declines at this time. I have discussed the risks and benefits of this procedure with her. The patient verbalizes understanding.  She will use Ozempic due to Heart and weight loss indications.  35-minute visit. 20 minutes spent counseling patient on diet, exercise, and weight loss.  This has been fully explained to the patient, who indicates understanding.  Review plate method  Review foods in home  Discuss food labels  Refer to exercise program  Assist with medical visit preparation  Review patient results  Review patient  education about results  Review chronic disease interventions (see specific disease intervention)  Assess knowledge level  Provide and discuss information/education material  Encourage communication with physician  Monitor compliance  Educate on risk of non-compliance  Give food and resource list  Review patient education material (see patient instructions)  Review patient education material (see patient instructions)  Refer to exercise program  Review stress management techniques  Review patient education material (see patient instructions)  Assess support system  Discuss stress management techniques  Review normal ranges for labs    Discussed health maintenance guidelines appropriate for age.  Discussed health maintenance guidelines appropriate for age.  Discussed health maintenance guidelines appropriate for age.  Discussed health maintenance guidelines appropriate for age.  Discussed health maintenance guidelines appropriate for age.  Discussed health maintenance guidelines appropriate for age.

## 2023-04-27 ENCOUNTER — LAB VISIT (OUTPATIENT)
Dept: LAB | Facility: HOSPITAL | Age: 73
End: 2023-04-27
Attending: FAMILY MEDICINE
Payer: MEDICARE

## 2023-04-27 DIAGNOSIS — E11.51 TYPE 2 DIABETES MELLITUS WITH DIABETIC PERIPHERAL ANGIOPATHY WITHOUT GANGRENE, WITHOUT LONG-TERM CURRENT USE OF INSULIN: ICD-10-CM

## 2023-04-27 LAB
ALBUMIN SERPL BCP-MCNC: 3.7 G/DL (ref 3.5–5.2)
ALP SERPL-CCNC: 79 U/L (ref 55–135)
ALT SERPL W/O P-5'-P-CCNC: 13 U/L (ref 10–44)
ANION GAP SERPL CALC-SCNC: 12 MMOL/L (ref 8–16)
AST SERPL-CCNC: 26 U/L (ref 10–40)
BILIRUB SERPL-MCNC: 0.3 MG/DL (ref 0.1–1)
BUN SERPL-MCNC: 14 MG/DL (ref 8–23)
CALCIUM SERPL-MCNC: 9.9 MG/DL (ref 8.7–10.5)
CHLORIDE SERPL-SCNC: 104 MMOL/L (ref 95–110)
CO2 SERPL-SCNC: 24 MMOL/L (ref 23–29)
CREAT SERPL-MCNC: 0.7 MG/DL (ref 0.5–1.4)
EST. GFR  (NO RACE VARIABLE): >60 ML/MIN/1.73 M^2
ESTIMATED AVG GLUCOSE: 108 MG/DL (ref 68–131)
GLUCOSE SERPL-MCNC: 100 MG/DL (ref 70–110)
HBA1C MFR BLD: 5.4 % (ref 4–5.6)
POTASSIUM SERPL-SCNC: 4.8 MMOL/L (ref 3.5–5.1)
PROT SERPL-MCNC: 7.2 G/DL (ref 6–8.4)
SODIUM SERPL-SCNC: 140 MMOL/L (ref 136–145)

## 2023-04-27 PROCEDURE — 83036 HEMOGLOBIN GLYCOSYLATED A1C: CPT | Performed by: FAMILY MEDICINE

## 2023-04-27 PROCEDURE — 80053 COMPREHEN METABOLIC PANEL: CPT | Performed by: FAMILY MEDICINE

## 2023-04-27 PROCEDURE — 36415 COLL VENOUS BLD VENIPUNCTURE: CPT | Mod: PO | Performed by: FAMILY MEDICINE

## 2023-05-04 ENCOUNTER — HOSPITAL ENCOUNTER (OUTPATIENT)
Dept: RADIOLOGY | Facility: HOSPITAL | Age: 73
Discharge: HOME | End: 2023-05-04
Attending: FAMILY MEDICINE
Payer: MEDICARE

## 2023-05-04 DIAGNOSIS — R10.32 LEFT LOWER QUADRANT PAIN: ICD-10-CM

## 2023-05-04 PROCEDURE — 74176 CT ABDOMEN PELVIS WITHOUT CONTRAST: ICD-10-PCS | Mod: 26,,, | Performed by: RADIOLOGY

## 2023-05-04 PROCEDURE — 74176 CT ABD & PELVIS W/O CONTRAST: CPT | Mod: 26,,, | Performed by: RADIOLOGY

## 2023-05-04 PROCEDURE — 25500020 PHARM REV CODE 255

## 2023-05-04 PROCEDURE — A9698 NON-RAD CONTRAST MATERIALNOC: HCPCS

## 2023-05-04 PROCEDURE — 74176 CT ABD & PELVIS W/O CONTRAST: CPT | Mod: TC

## 2023-05-04 RX ADMIN — IOHEXOL 50 ML: 300 INJECTION, SOLUTION INTRAVENOUS at 10:05

## 2023-05-04 RX ADMIN — IOHEXOL 1000 ML: 9 SOLUTION ORAL at 10:05

## 2023-05-23 ENCOUNTER — OFFICE VISIT (OUTPATIENT)
Dept: OPHTHALMOLOGY | Facility: CLINIC | Age: 73
End: 2023-05-23
Payer: MEDICARE

## 2023-05-23 DIAGNOSIS — Z96.1 PSEUDOPHAKIA, LEFT EYE: ICD-10-CM

## 2023-05-23 DIAGNOSIS — H35.342 MACULAR HOLE, LEFT EYE: ICD-10-CM

## 2023-05-23 DIAGNOSIS — H40.022 OAG (OPEN ANGLE GLAUCOMA) SUSPECT, HIGH RISK, LEFT: ICD-10-CM

## 2023-05-23 DIAGNOSIS — H25.11 AGE-RELATED NUCLEAR CATARACT, RIGHT: Primary | ICD-10-CM

## 2023-05-23 DIAGNOSIS — E11.9 DIABETES MELLITUS TYPE 2 WITHOUT RETINOPATHY: ICD-10-CM

## 2023-05-23 PROCEDURE — 1159F MED LIST DOCD IN RCRD: CPT | Mod: CPTII,S$GLB,, | Performed by: OPHTHALMOLOGY

## 2023-05-23 PROCEDURE — 3044F HG A1C LEVEL LT 7.0%: CPT | Mod: CPTII,S$GLB,, | Performed by: OPHTHALMOLOGY

## 2023-05-23 PROCEDURE — 99204 OFFICE O/P NEW MOD 45 MIN: CPT | Mod: S$GLB,,, | Performed by: OPHTHALMOLOGY

## 2023-05-23 PROCEDURE — 4010F PR ACE/ARB THEARPY RXD/TAKEN: ICD-10-PCS | Mod: CPTII,S$GLB,, | Performed by: OPHTHALMOLOGY

## 2023-05-23 PROCEDURE — 2023F PR DILATED RETINAL EXAM W/O EVID OF RETINOPATHY: ICD-10-PCS | Mod: CPTII,S$GLB,, | Performed by: OPHTHALMOLOGY

## 2023-05-23 PROCEDURE — 1160F PR REVIEW ALL MEDS BY PRESCRIBER/CLIN PHARMACIST DOCUMENTED: ICD-10-PCS | Mod: CPTII,S$GLB,, | Performed by: OPHTHALMOLOGY

## 2023-05-23 PROCEDURE — 99999 PR PBB SHADOW E&M-EST. PATIENT-LVL IV: ICD-10-PCS | Mod: PBBFAC,,, | Performed by: OPHTHALMOLOGY

## 2023-05-23 PROCEDURE — 99999 PR PBB SHADOW E&M-EST. PATIENT-LVL IV: CPT | Mod: PBBFAC,,, | Performed by: OPHTHALMOLOGY

## 2023-05-23 PROCEDURE — 1160F RVW MEDS BY RX/DR IN RCRD: CPT | Mod: CPTII,S$GLB,, | Performed by: OPHTHALMOLOGY

## 2023-05-23 PROCEDURE — 1157F PR ADVANCE CARE PLAN OR EQUIV PRESENT IN MEDICAL RECORD: ICD-10-PCS | Mod: CPTII,S$GLB,, | Performed by: OPHTHALMOLOGY

## 2023-05-23 PROCEDURE — 1126F PR PAIN SEVERITY QUANTIFIED, NO PAIN PRESENT: ICD-10-PCS | Mod: CPTII,S$GLB,, | Performed by: OPHTHALMOLOGY

## 2023-05-23 PROCEDURE — 1126F AMNT PAIN NOTED NONE PRSNT: CPT | Mod: CPTII,S$GLB,, | Performed by: OPHTHALMOLOGY

## 2023-05-23 PROCEDURE — 99214 OFFICE O/P EST MOD 30 MIN: CPT | Mod: PO | Performed by: OPHTHALMOLOGY

## 2023-05-23 PROCEDURE — 3044F PR MOST RECENT HEMOGLOBIN A1C LEVEL <7.0%: ICD-10-PCS | Mod: CPTII,S$GLB,, | Performed by: OPHTHALMOLOGY

## 2023-05-23 PROCEDURE — 99204 PR OFFICE/OUTPT VISIT, NEW, LEVL IV, 45-59 MIN: ICD-10-PCS | Mod: S$GLB,,, | Performed by: OPHTHALMOLOGY

## 2023-05-23 PROCEDURE — 2023F DILAT RTA XM W/O RTNOPTHY: CPT | Mod: CPTII,S$GLB,, | Performed by: OPHTHALMOLOGY

## 2023-05-23 PROCEDURE — 1159F PR MEDICATION LIST DOCUMENTED IN MEDICAL RECORD: ICD-10-PCS | Mod: CPTII,S$GLB,, | Performed by: OPHTHALMOLOGY

## 2023-05-23 PROCEDURE — 4010F ACE/ARB THERAPY RXD/TAKEN: CPT | Mod: CPTII,S$GLB,, | Performed by: OPHTHALMOLOGY

## 2023-05-23 PROCEDURE — 3288F FALL RISK ASSESSMENT DOCD: CPT | Mod: CPTII,S$GLB,, | Performed by: OPHTHALMOLOGY

## 2023-05-23 PROCEDURE — 1101F PR PT FALLS ASSESS DOC 0-1 FALLS W/OUT INJ PAST YR: ICD-10-PCS | Mod: CPTII,S$GLB,, | Performed by: OPHTHALMOLOGY

## 2023-05-23 PROCEDURE — 1157F ADVNC CARE PLAN IN RCRD: CPT | Mod: CPTII,S$GLB,, | Performed by: OPHTHALMOLOGY

## 2023-05-23 PROCEDURE — 3288F PR FALLS RISK ASSESSMENT DOCUMENTED: ICD-10-PCS | Mod: CPTII,S$GLB,, | Performed by: OPHTHALMOLOGY

## 2023-05-23 PROCEDURE — 1101F PT FALLS ASSESS-DOCD LE1/YR: CPT | Mod: CPTII,S$GLB,, | Performed by: OPHTHALMOLOGY

## 2023-05-23 NOTE — PROGRESS NOTES
HPI    Pt presents for cat eval OD       States blurred vision OD    No ocular meds     Hemoglobin A1C       Date                     Value               Ref Range             Status                04/27/2023               5.4                 4.0 - 5.6 %           Final                     12/05/2022               5.5                 4.0 - 5.6 %           Final                     07/22/2022               5.1                 4.0 - 5.6 %           Final                 Last edited by Brittany Jones on 5/23/2023 10:15 AM.            Assessment /Plan     For exam results, see Encounter Report.    Age-related nuclear cataract, right  -     Ambulatory referral/consult to Ophthalmology    Diabetes mellitus type 2 without retinopathy    Pseudophakia, left eye    Macular hole, left eye    OAG (open angle glaucoma) suspect, high risk, left      1. Age-related nuclear cataract, right  Patient with visually significant cataract impacting abiliity ADLs (reading, driving, PM driving, glare).  Discussed options, R & B, expectations, patient voices good understanding and wishes to proceed with procedure. Patient will likely benefit from sx.      Schedule CEIOL OD  Monofocal dist IOL  Yellow to match OS    2. Diabetes mellitus type 2 without retinopathy  Diabetes without retinopathy, discussed with patient importance of glucose control and follow up.  Patient voices understanding.    3. Pseudophakia, left eye  Tilted IOL, observe    4. Macular hole, left eye  Long-standing  observe    5. OAG (open angle glaucoma) suspect, high risk, left  Due to ONH appearance OS  Likely SOAG OS  IOP normal  Poor visual potential    Observe for now

## 2023-05-24 RX ORDER — OMEPRAZOLE 20 MG/1
CAPSULE, DELAYED RELEASE ORAL
Qty: 90 CAPSULE | Refills: 0 | OUTPATIENT
Start: 2023-05-24

## 2023-05-24 NOTE — TELEPHONE ENCOUNTER
Care Due:                  Date            Visit Type   Department     Provider  --------------------------------------------------------------------------------                                EP -                              PRIMARY      [facility]  Last Visit: 04-      CARE ([facility])   ONOFRE Bird                              EP -                              PRIMARY      [facility]  Next Visit: 11-      CARE ([facility])   ONOFRE Bird                                                            Last  Test          Frequency    Reason                     Performed    Due Date  --------------------------------------------------------------------------------    Lipid Panel.  12 months..  atorvastatin.............  03- 03-    [facility] Embedded Care Due Messages. Reference number: [id number].   5/24/2023 12:08:58 PM CDT

## 2023-05-24 NOTE — TELEPHONE ENCOUNTER
Provider Staff:  Action required for this patient     Please see care gap opportunities below in Care Due Message.    Thanks!  [facility]     Appointments      Date Provider   Last Visit   4/18/2023 Constantine Bird MD   Next Visit   11/3/2023 Constantine Bird MD     Refill Decision Note   Nelly Tian  is requesting a refill authorization.  Brief Assessment and Rationale for Refill:  Quick Discontinue     Medication Therapy Plan:  The original prescription was discontinued on 4/18/2023 by Constantine Bird MD for the following reason: Side effects. Renewing this prescription may not be appropriate.      Comments:     Note composed:1:06 PM 05/24/2023

## 2023-05-31 DIAGNOSIS — E11.9 TYPE 2 DIABETES MELLITUS WITHOUT COMPLICATION: ICD-10-CM

## 2023-06-05 ENCOUNTER — PATIENT MESSAGE (OUTPATIENT)
Dept: ADMINISTRATIVE | Facility: HOSPITAL | Age: 73
End: 2023-06-05
Payer: MEDICARE

## 2023-06-22 ENCOUNTER — HOSPITAL ENCOUNTER (INPATIENT)
Facility: HOSPITAL | Age: 73
LOS: 2 days | Discharge: HOME | DRG: 872 | End: 2023-06-26
Attending: EMERGENCY MEDICINE | Admitting: HOSPITALIST
Payer: MEDICARE

## 2023-06-22 DIAGNOSIS — G89.4 CHRONIC PAIN SYNDROME: ICD-10-CM

## 2023-06-22 DIAGNOSIS — L30.4 INTERTRIGINOUS DERMATITIS ASSOCIATED WITH MOISTURE: ICD-10-CM

## 2023-06-22 DIAGNOSIS — M51.36 DDD (DEGENERATIVE DISC DISEASE), LUMBAR: Chronic | ICD-10-CM

## 2023-06-22 DIAGNOSIS — A41.9 SEPSIS: ICD-10-CM

## 2023-06-22 DIAGNOSIS — L03.311 ABDOMINAL WALL CELLULITIS: Primary | ICD-10-CM

## 2023-06-22 LAB
ALBUMIN SERPL BCP-MCNC: 4 G/DL (ref 3.5–5.2)
ALP SERPL-CCNC: 92 U/L (ref 55–135)
ALT SERPL W/O P-5'-P-CCNC: 22 U/L (ref 10–44)
ANION GAP SERPL CALC-SCNC: 12 MMOL/L (ref 8–16)
AST SERPL-CCNC: 27 U/L (ref 10–40)
BACTERIA #/AREA URNS HPF: NORMAL /HPF
BASOPHILS # BLD AUTO: 0.06 K/UL (ref 0–0.2)
BASOPHILS NFR BLD: 0.4 % (ref 0–1.9)
BILIRUB SERPL-MCNC: 0.9 MG/DL (ref 0.1–1)
BILIRUB UR QL STRIP: NEGATIVE
BUN SERPL-MCNC: 14 MG/DL (ref 8–23)
CALCIUM SERPL-MCNC: 10.3 MG/DL (ref 8.7–10.5)
CHLORIDE SERPL-SCNC: 102 MMOL/L (ref 95–110)
CLARITY UR: CLEAR
CO2 SERPL-SCNC: 25 MMOL/L (ref 23–29)
COLOR UR: YELLOW
CREAT SERPL-MCNC: 0.7 MG/DL (ref 0.5–1.4)
DIFFERENTIAL METHOD: ABNORMAL
EOSINOPHIL # BLD AUTO: 0.2 K/UL (ref 0–0.5)
EOSINOPHIL NFR BLD: 1.2 % (ref 0–8)
ERYTHROCYTE [DISTWIDTH] IN BLOOD BY AUTOMATED COUNT: 13.9 % (ref 11.5–14.5)
EST. GFR  (NO RACE VARIABLE): >60 ML/MIN/1.73 M^2
GLUCOSE SERPL-MCNC: 91 MG/DL (ref 70–110)
GLUCOSE UR QL STRIP: ABNORMAL
HCT VFR BLD AUTO: 47.6 % (ref 37–48.5)
HCV AB SERPL QL IA: NORMAL
HGB BLD-MCNC: 14.7 G/DL (ref 12–16)
HGB UR QL STRIP: NEGATIVE
HIV 1+2 AB+HIV1 P24 AG SERPL QL IA: NORMAL
IMM GRANULOCYTES # BLD AUTO: 0.06 K/UL (ref 0–0.04)
IMM GRANULOCYTES NFR BLD AUTO: 0.4 % (ref 0–0.5)
KETONES UR QL STRIP: NEGATIVE
LACTATE SERPL-SCNC: 2.1 MMOL/L (ref 0.5–2.2)
LACTATE SERPL-SCNC: 2.5 MMOL/L (ref 0.5–2.2)
LEUKOCYTE ESTERASE UR QL STRIP: NEGATIVE
LYMPHOCYTES # BLD AUTO: 0.7 K/UL (ref 1–4.8)
LYMPHOCYTES NFR BLD: 5 % (ref 18–48)
MCH RBC QN AUTO: 29.1 PG (ref 27–31)
MCHC RBC AUTO-ENTMCNC: 30.9 G/DL (ref 32–36)
MCV RBC AUTO: 94 FL (ref 82–98)
MICROSCOPIC COMMENT: NORMAL
MONOCYTES # BLD AUTO: 1.1 K/UL (ref 0.3–1)
MONOCYTES NFR BLD: 8.1 % (ref 4–15)
NEUTROPHILS # BLD AUTO: 11.8 K/UL (ref 1.8–7.7)
NEUTROPHILS NFR BLD: 84.9 % (ref 38–73)
NITRITE UR QL STRIP: NEGATIVE
NRBC BLD-RTO: 0 /100 WBC
PH UR STRIP: 6 [PH] (ref 5–8)
PLATELET # BLD AUTO: 192 K/UL (ref 150–450)
PMV BLD AUTO: 10.2 FL (ref 9.2–12.9)
POCT GLUCOSE: 98 MG/DL (ref 70–110)
POTASSIUM SERPL-SCNC: 4.7 MMOL/L (ref 3.5–5.1)
PROT SERPL-MCNC: 8 G/DL (ref 6–8.4)
PROT UR QL STRIP: NEGATIVE
RBC # BLD AUTO: 5.05 M/UL (ref 4–5.4)
RBC #/AREA URNS HPF: 1 /HPF (ref 0–4)
SODIUM SERPL-SCNC: 139 MMOL/L (ref 136–145)
SP GR UR STRIP: 1 (ref 1–1.03)
URN SPEC COLLECT METH UR: ABNORMAL
UROBILINOGEN UR STRIP-ACNC: NEGATIVE EU/DL
WBC # BLD AUTO: 13.89 K/UL (ref 3.9–12.7)
YEAST URNS QL MICRO: NORMAL

## 2023-06-22 PROCEDURE — 86803 HEPATITIS C AB TEST: CPT | Performed by: EMERGENCY MEDICINE

## 2023-06-22 PROCEDURE — 36415 COLL VENOUS BLD VENIPUNCTURE: CPT | Performed by: EMERGENCY MEDICINE

## 2023-06-22 PROCEDURE — 94761 N-INVAS EAR/PLS OXIMETRY MLT: CPT

## 2023-06-22 PROCEDURE — 96366 THER/PROPH/DIAG IV INF ADDON: CPT

## 2023-06-22 PROCEDURE — G0378 HOSPITAL OBSERVATION PER HR: HCPCS

## 2023-06-22 PROCEDURE — 99291 CRITICAL CARE FIRST HOUR: CPT

## 2023-06-22 PROCEDURE — 87040 BLOOD CULTURE FOR BACTERIA: CPT | Performed by: EMERGENCY MEDICINE

## 2023-06-22 PROCEDURE — 25000003 PHARM REV CODE 250: Performed by: HOSPITALIST

## 2023-06-22 PROCEDURE — 93010 ELECTROCARDIOGRAM REPORT: CPT | Mod: ,,, | Performed by: SPECIALIST

## 2023-06-22 PROCEDURE — 93005 ELECTROCARDIOGRAM TRACING: CPT

## 2023-06-22 PROCEDURE — 96365 THER/PROPH/DIAG IV INF INIT: CPT

## 2023-06-22 PROCEDURE — 27000221 HC OXYGEN, UP TO 24 HOURS

## 2023-06-22 PROCEDURE — 80053 COMPREHEN METABOLIC PANEL: CPT | Performed by: EMERGENCY MEDICINE

## 2023-06-22 PROCEDURE — 85025 COMPLETE CBC W/AUTO DIFF WBC: CPT | Performed by: EMERGENCY MEDICINE

## 2023-06-22 PROCEDURE — 87389 HIV-1 AG W/HIV-1&-2 AB AG IA: CPT | Performed by: EMERGENCY MEDICINE

## 2023-06-22 PROCEDURE — 25000003 PHARM REV CODE 250: Performed by: EMERGENCY MEDICINE

## 2023-06-22 PROCEDURE — 96367 TX/PROPH/DG ADDL SEQ IV INF: CPT

## 2023-06-22 PROCEDURE — 83605 ASSAY OF LACTIC ACID: CPT | Mod: 91 | Performed by: EMERGENCY MEDICINE

## 2023-06-22 PROCEDURE — 93010 EKG 12-LEAD: ICD-10-PCS | Mod: ,,, | Performed by: SPECIALIST

## 2023-06-22 PROCEDURE — 81000 URINALYSIS NONAUTO W/SCOPE: CPT | Performed by: EMERGENCY MEDICINE

## 2023-06-22 PROCEDURE — 63600175 PHARM REV CODE 636 W HCPCS: Performed by: EMERGENCY MEDICINE

## 2023-06-22 RX ORDER — INSULIN ASPART 100 [IU]/ML
1-10 INJECTION, SOLUTION INTRAVENOUS; SUBCUTANEOUS
Status: DISCONTINUED | OUTPATIENT
Start: 2023-06-22 | End: 2023-06-25

## 2023-06-22 RX ORDER — HYDROCODONE BITARTRATE AND ACETAMINOPHEN 10; 325 MG/1; MG/1
1 TABLET ORAL EVERY 6 HOURS PRN
Status: DISCONTINUED | OUTPATIENT
Start: 2023-06-22 | End: 2023-06-26 | Stop reason: HOSPADM

## 2023-06-22 RX ORDER — ENOXAPARIN SODIUM 100 MG/ML
40 INJECTION SUBCUTANEOUS EVERY 24 HOURS
Status: DISCONTINUED | OUTPATIENT
Start: 2023-06-22 | End: 2023-06-22

## 2023-06-22 RX ORDER — SODIUM,POTASSIUM PHOSPHATES 280-250MG
2 POWDER IN PACKET (EA) ORAL
Status: DISCONTINUED | OUTPATIENT
Start: 2023-06-22 | End: 2023-06-26 | Stop reason: HOSPADM

## 2023-06-22 RX ORDER — IBUPROFEN 200 MG
16 TABLET ORAL
Status: DISCONTINUED | OUTPATIENT
Start: 2023-06-22 | End: 2023-06-25

## 2023-06-22 RX ORDER — LANOLIN ALCOHOL/MO/W.PET/CERES
800 CREAM (GRAM) TOPICAL
Status: DISCONTINUED | OUTPATIENT
Start: 2023-06-22 | End: 2023-06-26 | Stop reason: HOSPADM

## 2023-06-22 RX ORDER — IBUPROFEN 200 MG
24 TABLET ORAL
Status: DISCONTINUED | OUTPATIENT
Start: 2023-06-22 | End: 2023-06-25

## 2023-06-22 RX ORDER — HYDROCODONE BITARTRATE AND ACETAMINOPHEN 5; 325 MG/1; MG/1
1 TABLET ORAL EVERY 6 HOURS PRN
Status: DISCONTINUED | OUTPATIENT
Start: 2023-06-22 | End: 2023-06-26 | Stop reason: HOSPADM

## 2023-06-22 RX ORDER — GLUCAGON 1 MG
1 KIT INJECTION
Status: DISCONTINUED | OUTPATIENT
Start: 2023-06-22 | End: 2023-06-25

## 2023-06-22 RX ORDER — ONDANSETRON 4 MG/1
8 TABLET, ORALLY DISINTEGRATING ORAL EVERY 8 HOURS PRN
Status: DISCONTINUED | OUTPATIENT
Start: 2023-06-22 | End: 2023-06-26 | Stop reason: HOSPADM

## 2023-06-22 RX ORDER — ACETAMINOPHEN 500 MG
1000 TABLET ORAL
Status: COMPLETED | OUTPATIENT
Start: 2023-06-22 | End: 2023-06-22

## 2023-06-22 RX ORDER — ATORVASTATIN CALCIUM 10 MG/1
10 TABLET, FILM COATED ORAL EVERY OTHER DAY
Status: DISCONTINUED | OUTPATIENT
Start: 2023-06-23 | End: 2023-06-26 | Stop reason: HOSPADM

## 2023-06-22 RX ADMIN — APIXABAN 5 MG: 2.5 TABLET, FILM COATED ORAL at 09:06

## 2023-06-22 RX ADMIN — VANCOMYCIN HYDROCHLORIDE 2000 MG: 1 INJECTION, POWDER, LYOPHILIZED, FOR SOLUTION INTRAVENOUS at 04:06

## 2023-06-22 RX ADMIN — ACETAMINOPHEN 1000 MG: 500 TABLET ORAL at 03:06

## 2023-06-22 RX ADMIN — PIPERACILLIN AND TAZOBACTAM 4.5 G: 4; .5 INJECTION, POWDER, LYOPHILIZED, FOR SOLUTION INTRAVENOUS; PARENTERAL at 03:06

## 2023-06-22 NOTE — ASSESSMENT & PLAN NOTE
Patient is identified as having Combined Systolic and Diastolic heart failure that is Chronic. CHF is currently controlled. Latest ECHO performed and demonstrates- Results for orders placed during the hospital encounter of 01/31/23    Echo Saline Bubble? No    Interpretation Summary  · Moderate left atrial enlargement.  · Mild pulmonic regurgitation.  · Mild mitral regurgitation.  · A diastolic pattern consistent with atrial fibrillation observed.  · Limited study; 4 chamber 2chamber long axis, 2 chamber long axis views not seen, suggest repeat study  .Monitor clinical status closely. Monitor on telemetry. Patient is off CHF pathway.  Monitor strict Is&Os and daily weights.  Place on fluid restriction of 1.5 L. Continue to stress to patient importance of self efficacy and  on diet for CHF. Last BNP reviewed- and noted below No results for input(s): BNP, BNPTRIAGEBLO in the last 168 hours..

## 2023-06-22 NOTE — H&P
[facility] Medicine  History & Physical    Patient Name: Nelly Tian  MRN: [id number]  Patient Class: OP- Observation  Admission Date: 6/22/2023  Attending Physician: Toma Goins MD  Primary Care Provider: Constantine Bird MD         Patient information was obtained from patient, past medical records and ER records.     Subjective:     Principal Problem:Abdominal wall cellulitis    Chief Complaint:   Chief Complaint   Patient presents with    Abdominal Pain     With swelling and redness to Q        HPI: Patient is a 72-year-old female with history of AFib on Eliquis, combined heart failure, COPD, type 2 diabetes, COPD, numerous episodes of abdominal cellulitis presents to the ED with complaint recurrent abdominal wall cellulitis.  Patient reports she was feeling well yesterday but today began to have fever and chills and pain over the left side of her abdominal wall.  She notes some skin breakdown in the area that she thinks the infection entered through.  She denies any injury to the area.  She reports she is not been on suppressive antibiotic for many months.  In the past had been on suppressive cefadroxil per Dr. Juan.  In the ED she is febrile with an elevated white count and lactic acid.  She will be admitted for sepsis secondary to wall cellulitis.  Infectious Disease will be consulted.  She will be given IV vancomycin.      Past Medical History:   Diagnosis Date    *Atrial flutter     Angina pectoris 9/18/2017    Anxiety     Arthritis     Asthma     Atrial fibrillation     Back pain     Cataract     OD    Chest pain 9/17/2017    CHF (congestive heart failure)     Chronically on benzodiazepine therapy 5/4/2019    COPD (chronic obstructive pulmonary disease)     Depression     Diabetes mellitus     Emphysema of lung     Heart failure     Hepatomegaly 2/3/2016    Hernia     History of MI (myocardial infarction) 1/19/2016    Hypercapnic respiratory failure,  chronic 2016    Hyperlipidemia     Hypertension     Iron deficiency anemia 2/3/2016    Myocardial infarction     Obesity     Peripheral vascular disease     Pneumonia     Polyneuropathy     Retinal detachment     OS    Septic shock 2017    Skin ulcer 3/18/2017    Tobacco dependence     Type II 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, not stated as uncontrolled(250.60)        Past Surgical History:   Procedure Laterality Date    BREAST BIOPSY Left 10 yrs ago    benign    CATARACT EXTRACTION Left     OS      SECTION      x2    EYE SURGERY      HYSTERECTOMY      partial    OOPHORECTOMY      one ovary conserved    RETINAL DETACHMENT SURGERY      buckle --OS    TONSILLECTOMY         Review of patient's allergies indicates:   Allergen Reactions    Dilaudid [hydromorphone] Anaphylaxis     Other reaction(s): Anaphylaxis  Other reaction(s): Unknown    Zyvox [linezolid in dextrose 5%] Shortness Of Breath       No current facility-administered medications on file prior to encounter.     Current Outpatient Medications on File Prior to Encounter   Medication Sig    albuterol (PROVENTIL/VENTOLIN HFA) 90 mcg/actuation inhaler INHALE 2 PUFFS EVERY 6 HOURS AS NEEDED FOR WHEEZING  (RESCUE) (Patient taking differently: Inhale 2 puffs into the lungs every 6 (six) hours as needed.)    apixaban (ELIQUIS) 5 mg Tab Take 1 tablet (5 mg total) by mouth 2 (two) times daily.    ascorbic acid, vitamin C, (VITAMIN C) 500 MG tablet Take 2 tablets (1,000 mg total) by mouth every evening.    atorvastatin (LIPITOR) 10 MG tablet TAKE 1 TABLET(10 MG) BY MOUTH EVERY OTHER DAY (Patient taking differently: Take 10 mg by mouth every other day.)    blood sugar diagnostic Strp To check BG 2 times daily, to use with insurance preferred meter    blood-glucose meter kit To check BG 2 times daily, to use with insurance preferred meter    empagliflozin (JARDIANCE) 25 mg tablet Take 1  tablet (25 mg total) by mouth once daily.    fluticasone propionate (FLONASE) 50 mcg/actuation nasal spray 1 spray (50 mcg total) by Each Nostril route once daily.    furosemide (LASIX) 40 MG tablet TAKE 1 TABLET(40 MG) BY MOUTH EVERY DAY (Patient taking differently: Take 40 mg by mouth once daily.)    gabapentin (NEURONTIN) 800 MG tablet Take 1 tablet (800 mg total) by mouth 3 (three) times daily.    HYDROcodone-acetaminophen (NORCO) 7.5-325 mg per tablet Take 1 tablet by mouth every 12 (twelve) hours as needed for Pain. Medical necessary for greater than 7 days for chronic pain.    lancets Mis To check BG 2 times daily, to use with insurance preferred meter    LIDOcaine (LIDODERM) 5 % APPLY PATCH ONTO SKIN.REMOVE AND DISCARD PATCH WITHIN 12 HOURS OR AS DIRECTED BY MD    melatonin 10 mg Tab Take 10 mg by mouth nightly.    metFORMIN (GLUCOPHAGE) 500 MG tablet Take 1 tablet (500 mg total) by mouth daily with breakfast.    multivitamin (THERAGRAN) tablet Take 1 tablet by mouth once daily.    nystatin (NYSTOP) powder APPLY TOPICALLY TWICE DAILY (Patient taking differently: Apply 1 g topically 2 (two) times daily. APPLY TOPICALLY TWICE DAILY)    ondansetron (ZOFRAN-ODT) 8 MG TbDL Take 1 tablet (8 mg total) by mouth every 12 (twelve) hours as needed.    potassium chloride SA (K-DUR,KLOR-CON) 20 MEQ tablet Take 1 tablet (20 mEq total) by mouth once daily.    spironolactone (ALDACTONE) 25 MG tablet Take 1 tablet (25 mg total) by mouth once daily.     Family History       Problem Relation (Age of Onset)    Alcohol abuse Mother    Arrhythmia Father    Arthritis Father, Sister    Blindness Son    Cancer Brother    Cirrhosis Mother    Coronary artery disease Father, Sister    Crohn's disease Sister    Early death Sister (30)    Heart attack Father, Sister    Heart disease Father, Sister (32), Sister    Lung cancer Brother    No Known Problems Brother, Daughter          Tobacco Use    Smoking status: Former      Packs/day: 0.30     Years: 50.00     Pack years: 15.00     Types: Cigarettes     Start date: 1968     Quit date: 2022     Years since quittin.9    Smokeless tobacco: Never   Substance and Sexual Activity    Alcohol use: No     Alcohol/week: 0.0 standard drinks    Drug use: No    Sexual activity: Not Currently     Review of Systems   Constitutional:  Positive for chills, fatigue and fever.   HENT:  Negative for congestion and rhinorrhea.    Respiratory:  Negative for cough, shortness of breath and wheezing.    Cardiovascular:  Negative for chest pain, palpitations and leg swelling.   Gastrointestinal:  Negative for abdominal distention, abdominal pain, nausea and vomiting.   Endocrine: Negative for cold intolerance and heat intolerance.   Genitourinary:  Negative for dysuria and urgency.   Musculoskeletal:  Negative for arthralgias and neck stiffness.   Skin:  Positive for color change and rash.   Neurological:  Negative for dizziness and weakness.   Objective:     Vital Signs (Most Recent):  Temp: (!) 102.1 °F (38.9 °C) (23 1443)  Pulse: 92 (23 1703)  Resp: (!) 24 (23 1703)  BP: 139/65 (23 1703)  SpO2: (!) 93 % (23 1703) Vital Signs (24h Range):  Temp:  [100.1 °F (37.8 °C)-102.1 °F (38.9 °C)] 102.1 °F (38.9 °C)  Pulse:  [92-98] 92  Resp:  [22-24] 24  SpO2:  [93 %-95 %] 93 %  BP: (139-195)/(65-82) 139/65     Weight: 108.9 kg (240 lb)  Body mass index is 36.49 kg/m².     Physical Exam  Constitutional:       General: She is not in acute distress.     Appearance: She is well-developed.   HENT:      Head: Normocephalic and atraumatic.   Eyes:      Pupils: Pupils are equal, round, and reactive to light.   Cardiovascular:      Rate and Rhythm: Normal rate and regular rhythm.      Heart sounds: No murmur heard.  Pulmonary:      Effort: Pulmonary effort is normal. No respiratory distress.      Breath sounds: Normal breath sounds. No wheezing or rales.   Abdominal:      General:  Bowel sounds are normal. There is no distension.      Palpations: Abdomen is soft.      Tenderness: There is abdominal tenderness.      Comments: Large area of cellulitis over her pannus on the left side   Musculoskeletal:         General: Normal range of motion.   Skin:     General: Skin is warm and dry.      Findings: No rash.   Neurological:      Mental Status: She is alert and oriented to person, place, and time.      Cranial Nerves: No cranial nerve deficit.   Psychiatric:         Behavior: Behavior normal.            CRANIAL NERVES     CN III, IV, VI   Pupils are equal, round, and reactive to light.     Significant Labs: All pertinent labs within the past 24 hours have been reviewed.    Significant Imaging: I have reviewed all pertinent imaging results/findings within the past 24 hours.    Assessment/Plan:     * Abdominal wall cellulitis  IV vancomycin  Infectious disease consulted  Wound care  Consider addition of clindamycin  Patient may need to be on chronic suppressive medication again on discharge        Chronic combined systolic and diastolic CHF (congestive heart failure)  Patient is identified as having Combined Systolic and Diastolic heart failure that is Chronic. CHF is currently controlled. Latest ECHO performed and demonstrates- Results for orders placed during the hospital encounter of 01/31/23    Echo Saline Bubble? No    Interpretation Summary  · Moderate left atrial enlargement.  · Mild pulmonic regurgitation.  · Mild mitral regurgitation.  · A diastolic pattern consistent with atrial fibrillation observed.  · Limited study; 4 chamber 2chamber long axis, 2 chamber long axis views not seen, suggest repeat study  .Monitor clinical status closely. Monitor on telemetry. Patient is off CHF pathway.  Monitor strict Is&Os and daily weights.  Place on fluid restriction of 1.5 L. Continue to stress to patient importance of self efficacy and  on diet for CHF. Last BNP reviewed- and noted below No  results for input(s): BNP, BNPTRIAGEBLO in the last 168 hours..      Hyperlipidemia associated with type 2 diabetes mellitus   Patient is chronically on statin.will continue for now. Monitor clinically. Last LDL was   Lab Results   Component Value Date    LDLCALC 41.6 (L) 03/25/2022          Type 2 diabetes mellitus with diabetic neuropathy, without long-term current use of insulin  Patient's FSGs are controlled on current medication regimen.  Last A1c reviewed-   Lab Results   Component Value Date    HGBA1C 5.4 04/27/2023     Most recent fingerstick glucose reviewed- No results for input(s): POCTGLUCOSE in the last 24 hours.  Current correctional scale  Medium  Maintain anti-hyperglycemic dose as follows-   Antihyperglycemics (From admission, onward)    None        Hold Oral hypoglycemics while patient is in the hospital.    Hypertension associated with diabetes  Chronic, controlled.  Latest blood pressure and vitals reviewed-     Temp:  [100.1 °F (37.8 °C)-102.1 °F (38.9 °C)]   Pulse:  [92-98]   Resp:  [22-24]   BP: (139-195)/(65-82)   SpO2:  [93 %-95 %] .   Home meds for hypertension were reviewed and noted below.   Hypertension Medications             furosemide (LASIX) 40 MG tablet TAKE 1 TABLET(40 MG) BY MOUTH EVERY DAY    spironolactone (ALDACTONE) 25 MG tablet Take 1 tablet (25 mg total) by mouth once daily.          While in the hospital, will manage blood pressure as follows; Continue home antihypertensive regimen    Will utilize p.r.n. blood pressure medication only if patient's blood pressure greater than 180/110 and she develops symptoms such as worsening chest pain or shortness of breath.      Chronic atrial fibrillation  Telemetry monitoring  Continue anticoagulation with Eliquis      Severe obesity (BMI 35.0-35.9 with comorbidity)  Body mass index is 36.49 kg/m². Morbid obesity complicates all aspects of disease management from diagnostic modalities to treatment.           VTE Risk Mitigation (From  admission, onward)    None             On 06/22/2023, patient should be placed in hospital observation services under my care.  Case discussed with ED MD Toma Goins MD  Department of Hospital Medicine  [facility] - Emergency Dept

## 2023-06-22 NOTE — ED PROVIDER NOTES
Encounter Date: 2023       History     Chief Complaint   Patient presents with    Abdominal Pain     With swelling and redness to [facility] patient is 72-year-old woman with a history of CHF, COPD, diabetes, unhealthy weight, cellulitis, septic shock who presents emergency department for recurrent abdominal wall cellulitis with associated fever and chills that began today.  No alleviating treatments at home.  Review of patient's allergies indicates:   Allergen Reactions    Dilaudid [hydromorphone] Anaphylaxis     Other reaction(s): Anaphylaxis  Other reaction(s): Unknown    Zyvox [linezolid in dextrose 5%] Shortness Of Breath     Past Medical History:   Diagnosis Date    *Atrial flutter     Angina pectoris 2017    Anxiety     Arthritis     Asthma     Atrial fibrillation     Back pain     Cataract     OD    Chest pain 2017    CHF (congestive heart failure)     Chronically on benzodiazepine therapy 2019    COPD (chronic obstructive pulmonary disease)     Depression     Diabetes mellitus     Emphysema of lung     Heart failure     Hepatomegaly 2/3/2016    Hernia     History of MI (myocardial infarction) 2016    Hypercapnic respiratory failure, chronic 2016    Hyperlipidemia     Hypertension     Iron deficiency anemia 2/3/2016    Myocardial infarction     Obesity     Peripheral vascular disease     Pneumonia     Polyneuropathy     Retinal detachment     OS    Septic shock 2017    Skin ulcer 3/18/2017    Tobacco dependence     Type II 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, not stated as uncontrolled(250.60)      Past Surgical History:   Procedure Laterality Date    BREAST BIOPSY Left 10 yrs ago    benign    CATARACT EXTRACTION Left     OS      SECTION      x2    EYE SURGERY      HYSTERECTOMY      partial    OOPHORECTOMY      one ovary conserved    RETINAL DETACHMENT SURGERY      buckle --OS    TONSILLECTOMY       Family History   Problem  Relation Age of Onset    Alcohol abuse Mother     Cirrhosis Mother     Arthritis Father     Heart disease Father     Heart attack Father     Arrhythmia Father     Coronary artery disease Father     Coronary artery disease Sister     Early death Sister 30        heart disease    Heart disease Sister 32        MI    Heart attack Sister     Arthritis Sister     Heart disease Sister     Crohn's disease Sister     Cancer Brother         Lung cancer    Lung cancer Brother     No Known Problems Brother     No Known Problems Daughter     Blindness Son         due to accident//    Breast cancer Neg Hx     Glaucoma Neg Hx     Macular degeneration Neg Hx     Retinal detachment Neg Hx     Amblyopia Neg Hx     Diabetes Neg Hx     Cataracts Neg Hx     Hypertension Neg Hx     Strabismus Neg Hx     Stroke Neg Hx     Thyroid disease Neg Hx      Social History     Tobacco Use    Smoking status: Former     Packs/day: 0.30     Years: 50.00     Pack years: 15.00     Types: Cigarettes     Start date: 1968     Quit date: 2022     Years since quittin.9    Smokeless tobacco: Never   Substance Use Topics    Alcohol use: No     Alcohol/week: 0.0 standard drinks    Drug use: No     Review of Systems   Constitutional:  Positive for chills, fatigue and fever.   Musculoskeletal:  Positive for arthralgias.   Skin:  Positive for rash.     Physical Exam     Initial Vitals [23 1336]   BP Pulse Resp Temp SpO2   (!) 195/82 95 (!) 22 100.1 °F (37.8 °C) 95 %      MAP       --         Physical Exam    Nursing note and vitals reviewed.  Constitutional: She appears well-developed and well-nourished. No distress.   HENT:   Head: Normocephalic and atraumatic.   Eyes: EOM are normal. Pupils are equal, round, and reactive to light.   Neck: Neck supple.   Cardiovascular:  Regular rhythm and intact distal pulses.   Tachycardia present.         Pulmonary/Chest: No respiratory distress.   Abdominal: Abdomen is soft. Bowel sounds are normal.  There is abdominal tenderness.   Musculoskeletal:         General: Normal range of motion.      Cervical back: Neck supple.     Neurological: She is alert and oriented to person, place, and time. No cranial nerve deficit. GCS score is 15. GCS eye subscore is 4. GCS verbal subscore is 5. GCS motor subscore is 6.   Skin: Skin is warm and dry.   Large area of cellulitis over her pannus       ED Course   Procedures  Labs Reviewed   CBC W/ AUTO DIFFERENTIAL - Abnormal; Notable for the following components:       Result Value    WBC 13.89 (*)     MCHC 30.9 (*)     Gran # (ANC) 11.8 (*)     Immature Grans (Abs) 0.06 (*)     Lymph # 0.7 (*)     Mono # 1.1 (*)     Gran % 84.9 (*)     Lymph % 5.0 (*)     All other components within normal limits    Narrative:     Release to patient->Immediate   LACTIC ACID, PLASMA - Abnormal; Notable for the following components:    Lactate (Lactic Acid) 2.5 (*)     All other components within normal limits    Narrative:     Release to patient->Immediate   URINALYSIS, REFLEX TO URINE CULTURE - Abnormal; Notable for the following components:    Glucose, UA 3+ (*)     All other components within normal limits    Narrative:     Specimen Source->Urine   CULTURE, BLOOD   CULTURE, BLOOD   COMPREHENSIVE METABOLIC PANEL    Narrative:     Release to patient->Immediate   URINALYSIS MICROSCOPIC    Narrative:     Specimen Source->Urine   HIV 1 / 2 ANTIBODY   HEPATITIS C ANTIBODY     EKG Readings: (Independently Interpreted)   Atrial flutter with variable AV block.  98 beats per minute.  Right axis deviation.  Low voltage QRS.     Imaging Results              X-Ray Chest AP Portable (In process)  Result time 06/22/23 17:10:56                     Medications   vancomycin (VANCOCIN) 2,000 mg in dextrose 5 % (D5W) 500 mL IVPB (2,000 mg Intravenous New Bag 6/22/23 1641)   piperacillin-tazobactam (ZOSYN) 4.5 g in dextrose 5 % in water (D5W) 5 % 100 mL IVPB (MB+) (0 g Intravenous Stopped 6/22/23 1651)    acetaminophen tablet 1,000 mg (1,000 mg Oral Given 6/22/23 1542)     Medical Decision Making:   History:   Old Medical Records: I decided to obtain old medical records.  Initial Assessment:   This patient does have evidence of infective focus  My overall impression is sepsis.  Source: Skin and Soft Tissue (location abdomen wall)  Antibiotics given- Antibiotics (72h ago, onward)    Start     Stop Route Frequency Ordered    06/22/23 1515  vancomycin (VANCOCIN) 2,000 mg in dextrose 5 % (D5W) 500   mL IVPB         06/23 0314 IV ED 1 Time 06/22/23 1455      Latest lactate reviewed-  @[id number](lactate,poclac)@      Source control achieved by: iv abx    Independently Interpreted Test(s):   I have ordered and independently interpreted X-rays - see summary below.       <> Summary of X-Ray Reading(s): nopneumonia  ED Management:  Critical Care Time MDM    The high probability of sudden, clinically significant deterioration in the patient's condition required the highest level of my preparedness to intervene urgently.    The services I provided to this patient were to treat and/or prevent clinically significant deterioration that could result in permanent disability, chronic pain and/or death.     Services included the following: chart data review, reviewing nursing notes and/or old charts, documentation time, consultant collaboration regarding findings and treatment options, medication orders and management, direct patient care, vital sign assessments and ordering, interpreting and reviewing diagnostic studies/lab tests.    Aggregate critical care time was 45 minutes, which includes only time during which I was engaged in work directly related to the patient's care, as described above, whether at the bedside or elsewhere in the Emergency Department.     Lex Ridley M.D.                    ED Course as of 06/22/23 1712   Thu Jun 22, 2023 1710 Lactate, Maico(!): 2.5 [AS]   1710 WBC(!): 13.89 [AS]   1710 Hemoglobin:  14.7 [AS]   1710 Hematocrit: 47.6 [AS]   1711 Creatinine: 0.7 [AS]   1711 BILIRUBIN TOTAL: 0.9 [AS]   1711 Leukocytes, UA: Negative [AS]   1711 NITRITE UA: Negative [AS]   1712 Discussed Hospital Medicine who agrees to admit the patient for IV antibiotics. [AS]      ED Course User Index  [AS] Lex Ridley MD                 Clinical Impression:   Final diagnoses:  [A41.9] Sepsis (Primary)  [L03.311] Abdominal wall cellulitis        ED Disposition Condition    Observation                 Lex Ridley MD  06/22/23 1712       Lex Ridley MD  06/22/23 1712

## 2023-06-22 NOTE — ASSESSMENT & PLAN NOTE
Chronic, controlled.  Latest blood pressure and vitals reviewed-     Temp:  [100.1 °F (37.8 °C)-102.1 °F (38.9 °C)]   Pulse:  [92-98]   Resp:  [22-24]   BP: (139-195)/(65-82)   SpO2:  [93 %-95 %] .   Home meds for hypertension were reviewed and noted below.   Hypertension Medications             furosemide (LASIX) 40 MG tablet TAKE 1 TABLET(40 MG) BY MOUTH EVERY DAY    spironolactone (ALDACTONE) 25 MG tablet Take 1 tablet (25 mg total) by mouth once daily.          While in the hospital, will manage blood pressure as follows; Continue home antihypertensive regimen    Will utilize p.r.n. blood pressure medication only if patient's blood pressure greater than 180/110 and she develops symptoms such as worsening chest pain or shortness of breath.

## 2023-06-22 NOTE — ASSESSMENT & PLAN NOTE
Body mass index is 36.49 kg/m². Morbid obesity complicates all aspects of disease management from diagnostic modalities to treatment.

## 2023-06-22 NOTE — ASSESSMENT & PLAN NOTE
Patient's FSGs are controlled on current medication regimen.  Last A1c reviewed-   Lab Results   Component Value Date    HGBA1C 5.4 04/27/2023     Most recent fingerstick glucose reviewed- No results for input(s): POCTGLUCOSE in the last 24 hours.  Current correctional scale  Medium  Maintain anti-hyperglycemic dose as follows-   Antihyperglycemics (From admission, onward)    None        Hold Oral hypoglycemics while patient is in the hospital.

## 2023-06-22 NOTE — SUBJECTIVE & OBJECTIVE
Past Medical History:   Diagnosis Date    *Atrial flutter     Angina pectoris 2017    Anxiety     Arthritis     Asthma     Atrial fibrillation     Back pain     Cataract     OD    Chest pain 2017    CHF (congestive heart failure)     Chronically on benzodiazepine therapy 2019    COPD (chronic obstructive pulmonary disease)     Depression     Diabetes mellitus     Emphysema of lung     Heart failure     Hepatomegaly 2/3/2016    Hernia     History of MI (myocardial infarction) 2016    Hypercapnic respiratory failure, chronic 2016    Hyperlipidemia     Hypertension     Iron deficiency anemia 2/3/2016    Myocardial infarction     Obesity     Peripheral vascular disease     Pneumonia     Polyneuropathy     Retinal detachment     OS    Septic shock 2017    Skin ulcer 3/18/2017    Tobacco dependence     Type II 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, not stated as uncontrolled(250.60)        Past Surgical History:   Procedure Laterality Date    BREAST BIOPSY Left 10 yrs ago    benign    CATARACT EXTRACTION Left     OS      SECTION      x2    EYE SURGERY      HYSTERECTOMY      partial    OOPHORECTOMY      one ovary conserved    RETINAL DETACHMENT SURGERY      buckle --OS    TONSILLECTOMY         Review of patient's allergies indicates:   Allergen Reactions    Dilaudid [hydromorphone] Anaphylaxis     Other reaction(s): Anaphylaxis  Other reaction(s): Unknown    Zyvox [linezolid in dextrose 5%] Shortness Of Breath       No current facility-administered medications on file prior to encounter.     Current Outpatient Medications on File Prior to Encounter   Medication Sig    albuterol (PROVENTIL/VENTOLIN HFA) 90 mcg/actuation inhaler INHALE 2 PUFFS EVERY 6 HOURS AS NEEDED FOR WHEEZING  (RESCUE) (Patient taking differently: Inhale 2 puffs into the lungs every 6 (six) hours as needed.)    apixaban (ELIQUIS) 5 mg Tab Take 1 tablet (5 mg total) by mouth 2 (two)  times daily.    ascorbic acid, vitamin C, (VITAMIN C) 500 MG tablet Take 2 tablets (1,000 mg total) by mouth every evening.    atorvastatin (LIPITOR) 10 MG tablet TAKE 1 TABLET(10 MG) BY MOUTH EVERY OTHER DAY (Patient taking differently: Take 10 mg by mouth every other day.)    blood sugar diagnostic Strp To check BG 2 times daily, to use with insurance preferred meter    blood-glucose meter kit To check BG 2 times daily, to use with insurance preferred meter    empagliflozin (JARDIANCE) 25 mg tablet Take 1 tablet (25 mg total) by mouth once daily.    fluticasone propionate (FLONASE) 50 mcg/actuation nasal spray 1 spray (50 mcg total) by Each Nostril route once daily.    furosemide (LASIX) 40 MG tablet TAKE 1 TABLET(40 MG) BY MOUTH EVERY DAY (Patient taking differently: Take 40 mg by mouth once daily.)    gabapentin (NEURONTIN) 800 MG tablet Take 1 tablet (800 mg total) by mouth 3 (three) times daily.    HYDROcodone-acetaminophen (NORCO) 7.5-325 mg per tablet Take 1 tablet by mouth every 12 (twelve) hours as needed for Pain. Medical necessary for greater than 7 days for chronic pain.    lancets Misc To check BG 2 times daily, to use with insurance preferred meter    LIDOcaine (LIDODERM) 5 % APPLY PATCH ONTO SKIN.REMOVE AND DISCARD PATCH WITHIN 12 HOURS OR AS DIRECTED BY MD    melatonin 10 mg Tab Take 10 mg by mouth nightly.    metFORMIN (GLUCOPHAGE) 500 MG tablet Take 1 tablet (500 mg total) by mouth daily with breakfast.    multivitamin (THERAGRAN) tablet Take 1 tablet by mouth once daily.    nystatin (NYSTOP) powder APPLY TOPICALLY TWICE DAILY (Patient taking differently: Apply 1 g topically 2 (two) times daily. APPLY TOPICALLY TWICE DAILY)    ondansetron (ZOFRAN-ODT) 8 MG TbDL Take 1 tablet (8 mg total) by mouth every 12 (twelve) hours as needed.    potassium chloride SA (K-DUR,KLOR-CON) 20 MEQ tablet Take 1 tablet (20 mEq total) by mouth once daily.    spironolactone (ALDACTONE) 25 MG tablet Take 1 tablet (25 mg  total) by mouth once daily.     Family History       Problem Relation (Age of Onset)    Alcohol abuse Mother    Arrhythmia Father    Arthritis Father, Sister    Blindness Son    Cancer Brother    Cirrhosis Mother    Coronary artery disease Father, Sister    Crohn's disease Sister    Early death Sister (30)    Heart attack Father, Sister    Heart disease Father, Sister (32), Sister    Lung cancer Brother    No Known Problems Brother, Daughter          Tobacco Use    Smoking status: Former     Packs/day: 0.30     Years: 50.00     Pack years: 15.00     Types: Cigarettes     Start date: 1968     Quit date: 2022     Years since quittin.9    Smokeless tobacco: Never   Substance and Sexual Activity    Alcohol use: No     Alcohol/week: 0.0 standard drinks    Drug use: No    Sexual activity: Not Currently     Review of Systems   Constitutional:  Positive for chills, fatigue and fever.   HENT:  Negative for congestion and rhinorrhea.    Respiratory:  Negative for cough, shortness of breath and wheezing.    Cardiovascular:  Negative for chest pain, palpitations and leg swelling.   Gastrointestinal:  Negative for abdominal distention, abdominal pain, nausea and vomiting.   Endocrine: Negative for cold intolerance and heat intolerance.   Genitourinary:  Negative for dysuria and urgency.   Musculoskeletal:  Negative for arthralgias and neck stiffness.   Skin:  Positive for color change and rash.   Neurological:  Negative for dizziness and weakness.   Objective:     Vital Signs (Most Recent):  Temp: (!) 102.1 °F (38.9 °C) (23 1443)  Pulse: 92 (23 1703)  Resp: (!) 24 (23 1703)  BP: 139/65 (23 1703)  SpO2: (!) 93 % (23 1703) Vital Signs (24h Range):  Temp:  [100.1 °F (37.8 °C)-102.1 °F (38.9 °C)] 102.1 °F (38.9 °C)  Pulse:  [92-98] 92  Resp:  [22-24] 24  SpO2:  [93 %-95 %] 93 %  BP: (139-195)/(65-82) 139/65     Weight: 108.9 kg (240 lb)  Body mass index is 36.49 kg/m².     Physical  Exam  Constitutional:       General: She is not in acute distress.     Appearance: She is well-developed.   HENT:      Head: Normocephalic and atraumatic.   Eyes:      Pupils: Pupils are equal, round, and reactive to light.   Cardiovascular:      Rate and Rhythm: Normal rate and regular rhythm.      Heart sounds: No murmur heard.  Pulmonary:      Effort: Pulmonary effort is normal. No respiratory distress.      Breath sounds: Normal breath sounds. No wheezing or rales.   Abdominal:      General: Bowel sounds are normal. There is no distension.      Palpations: Abdomen is soft.      Tenderness: There is abdominal tenderness.      Comments: Large area of cellulitis over her pannus on the left side   Musculoskeletal:         General: Normal range of motion.   Skin:     General: Skin is warm and dry.      Findings: No rash.   Neurological:      Mental Status: She is alert and oriented to person, place, and time.      Cranial Nerves: No cranial nerve deficit.   Psychiatric:         Behavior: Behavior normal.            CRANIAL NERVES     CN III, IV, VI   Pupils are equal, round, and reactive to light.     Significant Labs: All pertinent labs within the past 24 hours have been reviewed.    Significant Imaging: I have reviewed all pertinent imaging results/findings within the past 24 hours.

## 2023-06-22 NOTE — HPI
Patient is a 72-year-old female with history of AFib on Eliquis, combined heart failure, COPD, type 2 diabetes, COPD, numerous episodes of abdominal cellulitis presents to the ED with complaint recurrent abdominal wall cellulitis.  Patient reports she was feeling well yesterday but today began to have fever and chills and pain over the left side of her abdominal wall.  She notes some skin breakdown in the area that she thinks the infection entered through.  She denies any injury to the area.  She reports she is not been on suppressive antibiotic for many months.  In the past had been on suppressive cefadroxil per Dr. Juan.  In the ED she is febrile with an elevated white count and lactic acid.  She will be admitted for sepsis secondary to wall cellulitis.  Infectious Disease will be consulted.  She will be given IV vancomycin.

## 2023-06-22 NOTE — ASSESSMENT & PLAN NOTE
IV vancomycin  Infectious disease consulted  Wound care  Consider addition of clindamycin  Patient may need to be on chronic suppressive medication again on discharge

## 2023-06-22 NOTE — PROGRESS NOTES
Pharmacokinetic Initial Assessment: IV Vancomycin    Assessment/Plan:    Initiate intravenous vancomycin with loading dose of 2000 mg once followed by a maintenance dose of vancomycin 1250mg IV every 12 hours  Desired empiric serum trough concentration is 10 to 15 mcg/mL  Draw vancomycin trough level 60 min prior to third dose on 6/23/23 at approximately 1530  Pharmacy will continue to follow and monitor vancomycin.      Please contact pharmacy at extension [phone] with any questions regarding this assessment.     Thank you for the consult,   Trista Teague       Patient brief summary:  Nelly Tian is a 72 y.o. female initiated on antimicrobial therapy with IV Vancomycin for treatment of suspected skin & soft tissue infection    Drug Allergies:   Review of patient's allergies indicates:   Allergen Reactions    Dilaudid [hydromorphone] Anaphylaxis     Other reaction(s): Anaphylaxis  Other reaction(s): Unknown    Zyvox [linezolid in dextrose 5%] Shortness Of Breath       Actual Body Weight:   108.9 kg    Renal Function:   Estimated Creatinine Clearance: 95.3 mL/min (based on SCr of 0.7 mg/dL).,     Dialysis Method (if applicable):  N/A    CBC (last 72 hours):  Recent Labs   Lab Result Units 06/22/23  1436   WBC K/uL 13.89*   Hemoglobin g/dL 14.7   Hematocrit % 47.6   Platelets K/uL 192   Gran % % 84.9*   Lymph % % 5.0*   Mono % % 8.1   Eosinophil % % 1.2   Basophil % % 0.4   Differential Method  Automated       Metabolic Panel (last 72 hours):  Recent Labs   Lab Result Units 06/22/23  1421 06/22/23  1436   Sodium mmol/L  --  139   Potassium mmol/L  --  4.7   Chloride mmol/L  --  102   CO2 mmol/L  --  25   Glucose mg/dL  --  91   Glucose, UA  3+*  --    BUN mg/dL  --  14   Creatinine mg/dL  --  0.7   Albumin g/dL  --  4.0   Total Bilirubin mg/dL  --  0.9   Alkaline Phosphatase U/L  --  92   AST U/L  --  27   ALT U/L  --  22       Drug levels (last 3 results):  No results for input(s): VANCOMYCINRA, VANCORANDOM,  VANCOMYCINPE, VANCOPEAK, VANCOMYCINTR, VANCOTROUGH in the last 72 hours.    Microbiologic Results:  Microbiology Results (last 7 days)       Procedure Component Value Units Date/Time    Blood culture x two cultures. Draw prior to antibiotics. [[id number]] Collected: 06/22/23 1454    Order Status: Sent Specimen: Blood Updated: 06/22/23 1454    Blood culture x two cultures. Draw prior to antibiotics. [[id number]] Collected: 06/22/23 1436    Order Status: Sent Specimen: Blood from Peripheral, Left Updated: 06/22/23 1435

## 2023-06-23 ENCOUNTER — OUTSIDE PLACE OF SERVICE (OUTPATIENT)
Dept: INFECTIOUS DISEASES | Facility: CLINIC | Age: 73
End: 2023-06-23
Payer: MEDICARE

## 2023-06-23 DIAGNOSIS — L03.311 ABDOMINAL WALL CELLULITIS: Primary | ICD-10-CM

## 2023-06-23 LAB
ALBUMIN SERPL BCP-MCNC: 3.3 G/DL (ref 3.5–5.2)
ALP SERPL-CCNC: 71 U/L (ref 55–135)
ALT SERPL W/O P-5'-P-CCNC: 20 U/L (ref 10–44)
ANION GAP SERPL CALC-SCNC: 10 MMOL/L (ref 8–16)
AST SERPL-CCNC: 25 U/L (ref 10–40)
BASOPHILS # BLD AUTO: 0.06 K/UL (ref 0–0.2)
BASOPHILS NFR BLD: 0.6 % (ref 0–1.9)
BILIRUB SERPL-MCNC: 0.7 MG/DL (ref 0.1–1)
BUN SERPL-MCNC: 14 MG/DL (ref 8–23)
CALCIUM SERPL-MCNC: 9.4 MG/DL (ref 8.7–10.5)
CHLORIDE SERPL-SCNC: 104 MMOL/L (ref 95–110)
CO2 SERPL-SCNC: 23 MMOL/L (ref 23–29)
CREAT SERPL-MCNC: 0.7 MG/DL (ref 0.5–1.4)
DIFFERENTIAL METHOD: ABNORMAL
EOSINOPHIL # BLD AUTO: 0.1 K/UL (ref 0–0.5)
EOSINOPHIL NFR BLD: 0.6 % (ref 0–8)
ERYTHROCYTE [DISTWIDTH] IN BLOOD BY AUTOMATED COUNT: 14.2 % (ref 11.5–14.5)
EST. GFR  (NO RACE VARIABLE): >60 ML/MIN/1.73 M^2
GLUCOSE SERPL-MCNC: 137 MG/DL (ref 70–110)
HCT VFR BLD AUTO: 43.4 % (ref 37–48.5)
HGB BLD-MCNC: 13.5 G/DL (ref 12–16)
IMM GRANULOCYTES # BLD AUTO: 0.04 K/UL (ref 0–0.04)
IMM GRANULOCYTES NFR BLD AUTO: 0.4 % (ref 0–0.5)
LYMPHOCYTES # BLD AUTO: 0.9 K/UL (ref 1–4.8)
LYMPHOCYTES NFR BLD: 9.5 % (ref 18–48)
MAGNESIUM SERPL-MCNC: 2.1 MG/DL (ref 1.6–2.6)
MCH RBC QN AUTO: 28.9 PG (ref 27–31)
MCHC RBC AUTO-ENTMCNC: 31.1 G/DL (ref 32–36)
MCV RBC AUTO: 93 FL (ref 82–98)
MONOCYTES # BLD AUTO: 0.7 K/UL (ref 0.3–1)
MONOCYTES NFR BLD: 6.9 % (ref 4–15)
NEUTROPHILS # BLD AUTO: 8.1 K/UL (ref 1.8–7.7)
NEUTROPHILS NFR BLD: 82 % (ref 38–73)
NRBC BLD-RTO: 0 /100 WBC
PHOSPHATE SERPL-MCNC: 3 MG/DL (ref 2.7–4.5)
PLATELET # BLD AUTO: 201 K/UL (ref 150–450)
PMV BLD AUTO: 10.5 FL (ref 9.2–12.9)
POCT GLUCOSE: 101 MG/DL (ref 70–110)
POCT GLUCOSE: 120 MG/DL (ref 70–110)
POCT GLUCOSE: 86 MG/DL (ref 70–110)
POCT GLUCOSE: 90 MG/DL (ref 70–110)
POTASSIUM SERPL-SCNC: 3.3 MMOL/L (ref 3.5–5.1)
POTASSIUM SERPL-SCNC: 3.9 MMOL/L (ref 3.5–5.1)
PROT SERPL-MCNC: 6.8 G/DL (ref 6–8.4)
RBC # BLD AUTO: 4.67 M/UL (ref 4–5.4)
SODIUM SERPL-SCNC: 137 MMOL/L (ref 136–145)
VANCOMYCIN TROUGH SERPL-MCNC: 7.4 UG/ML (ref 10–22)
WBC # BLD AUTO: 9.9 K/UL (ref 3.9–12.7)

## 2023-06-23 PROCEDURE — 25000003 PHARM REV CODE 250: Performed by: NURSE PRACTITIONER

## 2023-06-23 PROCEDURE — 94761 N-INVAS EAR/PLS OXIMETRY MLT: CPT

## 2023-06-23 PROCEDURE — C1751 CATH, INF, PER/CENT/MIDLINE: HCPCS

## 2023-06-23 PROCEDURE — 36415 COLL VENOUS BLD VENIPUNCTURE: CPT | Performed by: NURSE PRACTITIONER

## 2023-06-23 PROCEDURE — 25000003 PHARM REV CODE 250: Performed by: HOSPITALIST

## 2023-06-23 PROCEDURE — 80053 COMPREHEN METABOLIC PANEL: CPT | Performed by: HOSPITALIST

## 2023-06-23 PROCEDURE — 99223 PR INITIAL HOSPITAL CARE,LEVL III: ICD-10-PCS | Mod: S$GLB,,, | Performed by: INTERNAL MEDICINE

## 2023-06-23 PROCEDURE — 63700000 PHARM REV CODE 250 ALT 637 W/O HCPCS: Performed by: INTERNAL MEDICINE

## 2023-06-23 PROCEDURE — 63600175 PHARM REV CODE 636 W HCPCS: Performed by: HOSPITALIST

## 2023-06-23 PROCEDURE — 96366 THER/PROPH/DIAG IV INF ADDON: CPT

## 2023-06-23 PROCEDURE — G0378 HOSPITAL OBSERVATION PER HR: HCPCS

## 2023-06-23 PROCEDURE — 36415 COLL VENOUS BLD VENIPUNCTURE: CPT | Performed by: HOSPITALIST

## 2023-06-23 PROCEDURE — 36410 VNPNXR 3YR/> PHY/QHP DX/THER: CPT

## 2023-06-23 PROCEDURE — 83735 ASSAY OF MAGNESIUM: CPT | Performed by: HOSPITALIST

## 2023-06-23 PROCEDURE — 84132 ASSAY OF SERUM POTASSIUM: CPT | Performed by: NURSE PRACTITIONER

## 2023-06-23 PROCEDURE — 96365 THER/PROPH/DIAG IV INF INIT: CPT | Mod: 59

## 2023-06-23 PROCEDURE — 99223 1ST HOSP IP/OBS HIGH 75: CPT | Mod: S$GLB,,, | Performed by: INTERNAL MEDICINE

## 2023-06-23 PROCEDURE — 85025 COMPLETE CBC W/AUTO DIFF WBC: CPT | Performed by: HOSPITALIST

## 2023-06-23 PROCEDURE — 25000003 PHARM REV CODE 250: Performed by: INTERNAL MEDICINE

## 2023-06-23 PROCEDURE — 84100 ASSAY OF PHOSPHORUS: CPT | Performed by: HOSPITALIST

## 2023-06-23 PROCEDURE — 80202 ASSAY OF VANCOMYCIN: CPT | Performed by: HOSPITALIST

## 2023-06-23 RX ORDER — CLINDAMYCIN HYDROCHLORIDE 150 MG/1
300 CAPSULE ORAL EVERY 6 HOURS
Status: DISCONTINUED | OUTPATIENT
Start: 2023-06-23 | End: 2023-06-26 | Stop reason: HOSPADM

## 2023-06-23 RX ORDER — FLUCONAZOLE 100 MG/1
200 TABLET ORAL DAILY
Status: DISCONTINUED | OUTPATIENT
Start: 2023-06-23 | End: 2023-06-26 | Stop reason: HOSPADM

## 2023-06-23 RX ORDER — ACETAMINOPHEN 325 MG/1
650 TABLET ORAL EVERY 6 HOURS PRN
Status: DISCONTINUED | OUTPATIENT
Start: 2023-06-23 | End: 2023-06-26 | Stop reason: HOSPADM

## 2023-06-23 RX ADMIN — CLINDAMYCIN HYDROCHLORIDE 300 MG: 150 CAPSULE ORAL at 11:06

## 2023-06-23 RX ADMIN — ATORVASTATIN CALCIUM 10 MG: 10 TABLET, FILM COATED ORAL at 08:06

## 2023-06-23 RX ADMIN — FLUCONAZOLE 200 MG: 100 TABLET ORAL at 11:06

## 2023-06-23 RX ADMIN — APIXABAN 5 MG: 2.5 TABLET, FILM COATED ORAL at 08:06

## 2023-06-23 RX ADMIN — VANCOMYCIN HYDROCHLORIDE 1250 MG: 1.25 INJECTION, POWDER, LYOPHILIZED, FOR SOLUTION INTRAVENOUS at 05:06

## 2023-06-23 RX ADMIN — ACETAMINOPHEN 650 MG: 325 TABLET ORAL at 12:06

## 2023-06-23 RX ADMIN — APIXABAN 5 MG: 2.5 TABLET, FILM COATED ORAL at 09:06

## 2023-06-23 RX ADMIN — CLINDAMYCIN HYDROCHLORIDE 300 MG: 150 CAPSULE ORAL at 06:06

## 2023-06-23 RX ADMIN — HYDROCODONE BITARTRATE AND ACETAMINOPHEN 1 TABLET: 10; 325 TABLET ORAL at 09:06

## 2023-06-23 RX ADMIN — POTASSIUM BICARBONATE 50 MEQ: 978 TABLET, EFFERVESCENT ORAL at 05:06

## 2023-06-23 RX ADMIN — HYDROCODONE BITARTRATE AND ACETAMINOPHEN 1 TABLET: 5; 325 TABLET ORAL at 08:06

## 2023-06-23 RX ADMIN — VANCOMYCIN HYDROCHLORIDE 1500 MG: 1.5 INJECTION, POWDER, LYOPHILIZED, FOR SOLUTION INTRAVENOUS at 04:06

## 2023-06-23 NOTE — ASSESSMENT & PLAN NOTE
Patient's FSGs are controlled on current medication regimen.  Last A1c reviewed-   Lab Results   Component Value Date    HGBA1C 5.4 04/27/2023     Most recent fingerstick glucose reviewed-   Recent Labs   Lab 06/22/23  2216 06/23/23  0735 06/23/23  1144   POCTGLUCOSE 98 90 86     Current correctional scale  Medium  Maintain anti-hyperglycemic dose as follows-   Antihyperglycemics (From admission, onward)    Start     Stop Route Frequency Ordered    06/22/23 1828  insulin aspart U-100 pen 1-10 Units         -- SubQ Before meals & nightly PRN 06/22/23 1828        Hold Oral hypoglycemics while patient is in the hospital.

## 2023-06-23 NOTE — PROGRESS NOTES
[facility] Hospital Medicine  Progress Note    Patient Name: Nelly Tian  MRN: [id number]  Patient Class: OP- Observation   Admission Date: 6/22/2023  Length of Stay: 0 days  Attending Physician: Toma Goins MD  Primary Care Provider: Constantine Bird MD        Subjective:     Principal Problem:Abdominal wall cellulitis        HPI:  Patient is a 72-year-old female with history of AFib on Eliquis, combined heart failure, COPD, type 2 diabetes, COPD, numerous episodes of abdominal cellulitis presents to the ED with complaint recurrent abdominal wall cellulitis.  Patient reports she was feeling well yesterday but today began to have fever and chills and pain over the left side of her abdominal wall.  She notes some skin breakdown in the area that she thinks the infection entered through.  She denies any injury to the area.  She reports she is not been on suppressive antibiotic for many months.  In the past had been on suppressive cefadroxil per Dr. Juan.  In the ED she is febrile with an elevated white count and lactic acid.  She will be admitted for sepsis secondary to wall cellulitis.  Infectious Disease will be consulted.  She will be given IV vancomycin.      Overview/Hospital Course:  No notes on file    Interval History:  Patient seen and examined.  Reports she is feeling better today.  Abdomen is less tender.  Still erythematous.  Discussed with ID Dr. Juan, clindamycin and fluconazole added.    Review of Systems   Constitutional:  Positive for chills, fatigue and fever.   HENT:  Negative for congestion and rhinorrhea.    Respiratory:  Negative for cough, shortness of breath and wheezing.    Cardiovascular:  Negative for chest pain, palpitations and leg swelling.   Gastrointestinal:  Negative for abdominal distention, abdominal pain, nausea and vomiting.   Endocrine: Negative for cold intolerance and heat intolerance.   Genitourinary:  Negative for dysuria and urgency.    Musculoskeletal:  Negative for arthralgias and neck stiffness.   Skin:  Positive for color change and rash.   Neurological:  Negative for dizziness and weakness.   Objective:     Vital Signs (Most Recent):  Temp: 97.6 °F (36.4 °C) (06/23/23 0749)  Pulse: 87 (06/23/23 0812)  Resp: 18 (06/23/23 0833)  BP: (!) 122/59 (06/23/23 0749)  SpO2: (!) 93 % (06/23/23 0812) Vital Signs (24h Range):  Temp:  [97.6 °F (36.4 °C)-102.1 °F (38.9 °C)] 97.6 °F (36.4 °C)  Pulse:  [] 87  Resp:  [16-24] 18  SpO2:  [93 %-95 %] 93 %  BP: (122-195)/(58-82) 122/59     Weight: 108.9 kg (240 lb)  Body mass index is 36.49 kg/m².    Intake/Output Summary (Last 24 hours) at 6/23/2023 1216  Last data filed at 6/23/2023 0130  Gross per 24 hour   Intake 100 ml   Output 600 ml   Net -500 ml         Physical Exam  Constitutional:       General: She is not in acute distress.     Appearance: She is well-developed.   HENT:      Head: Normocephalic and atraumatic.   Eyes:      Pupils: Pupils are equal, round, and reactive to light.   Cardiovascular:      Rate and Rhythm: Normal rate and regular rhythm.      Heart sounds: No murmur heard.  Pulmonary:      Effort: Pulmonary effort is normal. No respiratory distress.      Breath sounds: Normal breath sounds. No wheezing or rales.   Abdominal:      General: Bowel sounds are normal. There is no distension.      Palpations: Abdomen is soft.      Tenderness: There is no abdominal tenderness.      Comments: Large area of cellulitis over her pannus on the left side, improving   Musculoskeletal:         General: Normal range of motion.   Skin:     General: Skin is warm and dry.      Findings: No rash.   Neurological:      Mental Status: She is alert and oriented to person, place, and time.      Cranial Nerves: No cranial nerve deficit.   Psychiatric:         Behavior: Behavior normal.           Significant Labs: All pertinent labs within the past 24 hours have been reviewed.    Significant Imaging: I have  reviewed all pertinent imaging results/findings within the past 24 hours.      Assessment/Plan:      * Abdominal wall cellulitis  IV vancomycin  Fluconazole  Clindamycin  Infectious disease consulted, discussed with Dr. Juna  Wound care  Patient may need to be on chronic suppressive medication again on discharge        Chronic combined systolic and diastolic CHF (congestive heart failure)  Patient is identified as having Combined Systolic and Diastolic heart failure that is Chronic. CHF is currently controlled. Latest ECHO performed and demonstrates- Results for orders placed during the hospital encounter of 01/31/23    Echo Saline Bubble? No    Interpretation Summary  · Moderate left atrial enlargement.  · Mild pulmonic regurgitation.  · Mild mitral regurgitation.  · A diastolic pattern consistent with atrial fibrillation observed.  · Limited study; 4 chamber 2chamber long axis, 2 chamber long axis views not seen, suggest repeat study  .Monitor clinical status closely. Monitor on telemetry. Patient is off CHF pathway.  Monitor strict Is&Os and daily weights.  Place on fluid restriction of 1.5 L. Continue to stress to patient importance of self efficacy and  on diet for CHF. Last BNP reviewed- and noted below No results for input(s): BNP, BNPTRIAGEBLO in the last 168 hours..      Hyperlipidemia associated with type 2 diabetes mellitus   Patient is chronically on statin.will continue for now. Monitor clinically. Last LDL was   Lab Results   Component Value Date    LDLCALC 41.6 (L) 03/25/2022          Type 2 diabetes mellitus with diabetic neuropathy, without long-term current use of insulin  Patient's FSGs are controlled on current medication regimen.  Last A1c reviewed-   Lab Results   Component Value Date    HGBA1C 5.4 04/27/2023     Most recent fingerstick glucose reviewed-   Recent Labs   Lab 06/22/23  2216 06/23/23  0735 06/23/23  1144   POCTGLUCOSE 98 90 86     Current correctional scale   Medium  Maintain anti-hyperglycemic dose as follows-   Antihyperglycemics (From admission, onward)    Start     Stop Route Frequency Ordered    06/22/23 1828  insulin aspart U-100 pen 1-10 Units         -- SubQ Before meals & nightly PRN 06/22/23 1828        Hold Oral hypoglycemics while patient is in the hospital.    Hypertension associated with diabetes  Chronic, controlled.  Latest blood pressure and vitals reviewed-     Temp:  [100.1 °F (37.8 °C)-102.1 °F (38.9 °C)]   Pulse:  [92-98]   Resp:  [22-24]   BP: (139-195)/(65-82)   SpO2:  [93 %-95 %] .   Home meds for hypertension were reviewed and noted below.   Hypertension Medications             furosemide (LASIX) 40 MG tablet TAKE 1 TABLET(40 MG) BY MOUTH EVERY DAY    spironolactone (ALDACTONE) 25 MG tablet Take 1 tablet (25 mg total) by mouth once daily.          While in the hospital, will manage blood pressure as follows; Continue home antihypertensive regimen    Will utilize p.r.n. blood pressure medication only if patient's blood pressure greater than 180/110 and she develops symptoms such as worsening chest pain or shortness of breath.      Chronic atrial fibrillation  Telemetry monitoring  Continue anticoagulation with Eliquis      Severe obesity (BMI 35.0-35.9 with comorbidity)  Body mass index is 36.49 kg/m². Morbid obesity complicates all aspects of disease management from diagnostic modalities to treatment.           VTE Risk Mitigation (From admission, onward)         Ordered     apixaban tablet 5 mg  2 times daily         06/22/23 1828     IP VTE HIGH RISK PATIENT  Once         06/22/23 1828     Place sequential compression device  Until discontinued         06/22/23 1828     Reason for No Pharmacological VTE Prophylaxis  Once        Question:  Reasons:  Answer:  Already adequately anticoagulated on oral Anticoagulants    06/22/23 1828                Discharge Planning   KRISTOPHER:      Code Status: DNR   Is the patient medically ready for discharge?:      Reason for patient still in hospital (select all that apply): Patient trending condition and Treatment                     Toma Goins MD  Department of Hospital Medicine   [facility]

## 2023-06-23 NOTE — CONSULTS
[facility]  Wound Care    Patient Name:  Nelly Tian   MRN:  [id number]  Date: 2023  Diagnosis: Abdominal wall cellulitis    History:     Past Medical History:   Diagnosis Date    *Atrial flutter     Angina pectoris 2017    Anxiety     Arthritis     Asthma     Atrial fibrillation     Back pain     Cataract     OD    Chest pain 2017    CHF (congestive heart failure)     Chronically on benzodiazepine therapy 2019    COPD (chronic obstructive pulmonary disease)     Depression     Diabetes mellitus     Emphysema of lung     Heart failure     Hepatomegaly 2/3/2016    Hernia     History of MI (myocardial infarction) 2016    Hypercapnic respiratory failure, chronic 2016    Hyperlipidemia     Hypertension     Iron deficiency anemia 2/3/2016    Myocardial infarction     Obesity     Peripheral vascular disease     Pneumonia     Polyneuropathy     Retinal detachment     OS    Septic shock 2017    Skin ulcer 3/18/2017    Tobacco dependence     Type II 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, not stated as uncontrolled(250.60)        Social History     Socioeconomic History    Marital status:    Tobacco Use    Smoking status: Former     Packs/day: 0.30     Years: 50.00     Pack years: 15.00     Types: Cigarettes     Start date: 1968     Quit date: 2022     Years since quittin.9    Smokeless tobacco: Never   Substance and Sexual Activity    Alcohol use: No     Alcohol/week: 0.0 standard drinks    Drug use: No    Sexual activity: Not Currently     Social Determinants of Health     Financial Resource Strain: Medium Risk    Difficulty of Paying Living Expenses: Somewhat hard   Food Insecurity: Food Insecurity Present    Worried About Running Out of Food in the Last Year: Sometimes true    Ran Out of Food in the Last Year: Sometimes true   Transportation Needs: Unmet Transportation Needs    Lack of Transportation  (Medical): Yes    Lack of Transportation (Non-Medical): Yes   Physical Activity: Insufficiently Active    Days of Exercise per Week: 2 days    Minutes of Exercise per Session: 20 min   Stress: No Stress Concern Present    Feeling of Stress : Not at all   Social Connections: Moderately Integrated    Frequency of Communication with Friends and Family: Once a week    Frequency of Social Gatherings with Friends and Family: Three times a week    Attends [employer] Services: More than 4 times per year    Active Member of Clubs or Organizations: No    Attends Club or Organization Meetings: 1 to 4 times per year    Marital Status:    Housing Stability: Low Risk     Unable to Pay for Housing in the Last Year: No    Number of Places Lived in the Last Year: 1    Unstable Housing in the Last Year: No       Precautions:     Allergies as of 06/22/2023 - Reviewed 06/22/2023   Allergen Reaction Noted    Dilaudid [hydromorphone] Anaphylaxis 12/03/2011    Zyvox [linezolid in dextrose 5%] Shortness Of Breath 09/17/2018       WOC Assessment Details/Treatment     Wound care consulted for abdominal cellulitis, no wounds noted to abdomen. MASD noted to abdominal folds. Cleanse folds with with chlorhexidine/ns. Pat dry. Apply a thin layer of Triad and place on an air flow pad daily can place air flow pads into folds to wick away moisture.   DTI noted to sacrum POA. Cleanse area with soap and water pat the skin dry and apply a thin layer of triad. Air flow pump to be placed on bed.                          06/23/2023

## 2023-06-23 NOTE — PLAN OF CARE
[facility]  Initial Discharge Assessment       Primary Care Provider: Constantine Bird MD    Admission Diagnosis: Abdominal wall cellulitis [L03.311]  Sepsis [A41.9]    Admission Date: 6/22/2023  Expected Discharge Date: 6/26/2023    Met with pt at bedside to complete discharge assessment, verified PCP, pharmacy and information on facesheet.  No HH or dialysis.  See DME below.  DaughterMaye will drive pt home.  No needs identified at this time.    Transition of Care Barriers: None    Payor: HU[employer] MANAGED MEDICARE / Plan: HU[employer]NA MEDICARE PPO / Product Type: Medicare Advantage /     Extended Emergency Contact Information  Primary Emergency Contact: Maye Tirado  Address: [address]           [place] [address] [facility]  Home Phone: [phone]  Work Phone: [phone]  Mobile Phone: [phone]  Relation: Daughter  Preferred language: English   needed? No  Secondary Emergency Contact: AakashKhloeSharda  Mobile Phone: [phone]  Relation: Friend  Preferred language: English   needed? No    Discharge Plan A: Home  Discharge Plan B: Home Health      [id number] - [place] - [address] AT [facility] [address]  [address] [address]  Phone: [phone] Fax: [phone]    [facility] - [facility] [address]  [address] [address]  Phone: [phone] Fax: [phone]    [id number] - [place] - [address]  [address] [address]  Phone: [phone] Fax: [phone]      Initial Assessment (most recent)       Adult Discharge Assessment - 06/23/23 1225          Discharge Assessment    Assessment Type Discharge Planning Assessment     Confirmed/corrected address, phone number and insurance Yes     Confirmed Demographics Correct on Facesheet     Source of Information patient      Communicated KRISTOPHER with patient/caregiver No     People in Home child(bonny), adult;grandchild(bonny)     Do you expect to return to your current living situation? Yes     Prior to hospitilization cognitive status: Alert/Oriented     Current cognitive status: Alert/Oriented     Walking or Climbing Stairs ambulation difficulty, requires equipment     Mobility Management cane, sometimes     Home Accessibility wheelchair accessible     Home Layout Able to live on 1st floor     Equipment Currently Used at Home oxygen;bedside commode;cane, straight     Readmission within 30 days? No     Patient currently being followed by outpatient case management? No     Do you currently have service(s) that help you manage your care at home? No     Do you take prescription medications? Yes     Do you have prescription coverage? Yes     Do you have any problems affording any of your prescribed medications? No     Is the patient taking medications as prescribed? yes     Who is going to help you get home at discharge? Maye alegria     How do you get to doctors appointments? family or friend will provide     Are you on dialysis? No     Do you take coumadin? No     Discharge Plan A Home     Discharge Plan B Home Health     DME Needed Upon Discharge  none     Discharge Plan discussed with: Patient     Transition of Care Barriers None

## 2023-06-23 NOTE — NURSING
1900: witnessed pt sign AMA form with [facility] nurse stating she would not get out of bed without using call light for assistance.    2000: doing hourly patient rounds, found pt trying to get out of bed (had 1 leg on the ground, turned to get out of bed, she articulated that that's what she was trying to do); reeducated pt on fall risks, including increased risk of bleed (eliquis); pt verbalized/acknowledged understanding, no further questions/comments/concerns; assisted pt to restroom, waited outside door for 3 minutes, knocked, said she was done, assisted her up, took her back to bed, unsteady on her feet, rehooked her up to all devices, turned bed alarm back on, put call light in her lap and instructed her to call if she needs anything, including getting up; pt verbalized/acknowledged understanding.    2200: found pt trying to get out of bed again, similar scenario as 2000. Took her to restroom in similar fashion as before (weakness, unsteady, waited for her), put back in bed, call light within reach, education included, acknowledged understanding.    2350: notified by CNA that pt is angry that her bed alarm is turned on; went to patient's room to talk with her about it; she agreed that it would be best to keep the bed light on, especially this late at night, considering she may forget to use call light; pt alert, oriented x 4; she agreed with me and has agreed to keep the bed alarm turned on, educated pt on fall risks again, eliquis education; pt acknowledged/verbalized understanding; I will increase my hourly rounding to every 30 minutes for the remainder of my shift

## 2023-06-23 NOTE — CONSULTS
Consult Note  Infectious Disease    Reason for Consult:      HPI: Nelly Tian is a 72 y.o. female  well known to me with prior diagnosis of recurrent anterior abdominal wall cellulitis, fungal groin infection, AFib, a flutter, CHF, COPD, DM with good hemoglobin A1c, anxiety, depression, came in complaining of left abdominal wall redness, pain, warmth, progressively worse, same as with prior cellulitis.      She feels that IV had infiltrated and she may have not received all of the vancomycin dose prescribed..  I had seen patient last year and she received at least 3 months of chronic suppression therapy with cefadroxil.  We discussed that this time around we may need to go for a longer duration.    She is up-to-date with tetanus vaccine.    She is uncomfortable in the hospital and would like to go home as soon as she can.  I do not think she is able for discharge today or probably not ready until Monday.     Antibiotics (From admission, onward)      Start     Stop Route Frequency Ordered    06/23/23 0430  vancomycin 1,250 mg in dextrose 5 % (D5W) 250 mL IVPB (Vial-Mate)         -- IV Every 12 hours (non-standard times) 06/22/23 1844    06/22/23 1828  vancomycin - pharmacy to dose  ( Skin & Soft Tissue Infection: Purulent, Severe)        See Hypersjonny for full Linked Orders Report.    -- IV pharmacy to manage frequency 06/22/23 1828          Antifungals (From admission, onward)      None          Antivirals (From admission, onward)      None              Review of patient's allergies indicates:   Allergen Reactions    Dilaudid [hydromorphone] Anaphylaxis     Other reaction(s): Anaphylaxis  Other reaction(s): Unknown    Zyvox [linezolid in dextrose 5%] Shortness Of Breath     Past Medical History:   Diagnosis Date    *Atrial flutter     Angina pectoris 9/18/2017    Anxiety     Arthritis     Asthma     Atrial fibrillation     Back pain     Cataract     OD    Chest pain 9/17/2017    CHF (congestive heart  failure)     Chronically on benzodiazepine therapy 2019    COPD (chronic obstructive pulmonary disease)     Depression     Diabetes mellitus     Emphysema of lung     Heart failure     Hepatomegaly 2/3/2016    Hernia     History of MI (myocardial infarction) 2016    Hypercapnic respiratory failure, chronic 2016    Hyperlipidemia     Hypertension     Iron deficiency anemia 2/3/2016    Myocardial infarction     Obesity     Peripheral vascular disease     Pneumonia     Polyneuropathy     Retinal detachment     OS    Septic shock 2017    Skin ulcer 3/18/2017    Tobacco dependence     Type II 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, not stated as uncontrolled(250.60)      Past Surgical History:   Procedure Laterality Date    BREAST BIOPSY Left 10 yrs ago    benign    CATARACT EXTRACTION Left     OS      SECTION      x2    EYE SURGERY      HYSTERECTOMY      partial    OOPHORECTOMY      one ovary conserved    RETINAL DETACHMENT SURGERY      buckle --OS    TONSILLECTOMY       Social History     Socioeconomic History    Marital status:    Tobacco Use    Smoking status: Former     Packs/day: 0.30     Years: 50.00     Pack years: 15.00     Types: Cigarettes     Start date: 1968     Quit date: 2022     Years since quittin.9    Smokeless tobacco: Never   Substance and Sexual Activity    Alcohol use: No     Alcohol/week: 0.0 standard drinks    Drug use: No    Sexual activity: Not Currently     Social Determinants of Health     Financial Resource Strain: Medium Risk    Difficulty of Paying Living Expenses: Somewhat hard   Food Insecurity: Food Insecurity Present    Worried About Running Out of Food in the Last Year: Sometimes true    Ran Out of Food in the Last Year: Sometimes true   Transportation Needs: Unmet Transportation Needs    Lack of Transportation (Medical): Yes    Lack of Transportation (Non-Medical): Yes   Physical Activity: Insufficiently  Active    Days of Exercise per Week: 2 days    Minutes of Exercise per Session: 20 min   Stress: No Stress Concern Present    Feeling of Stress : Not at all   Social Connections: Moderately Integrated    Frequency of Communication with Friends and Family: Once a week    Frequency of Social Gatherings with Friends and Family: Three times a week    Attends [employer] Services: More than 4 times per year    Active Member of Clubs or Organizations: No    Attends Club or Organization Meetings: 1 to 4 times per year    Marital Status:    Housing Stability: Low Risk     Unable to Pay for Housing in the Last Year: No    Number of Places Lived in the Last Year: 1    Unstable Housing in the Last Year: No     Family History   Problem Relation Age of Onset    Alcohol abuse Mother     Cirrhosis Mother     Arthritis Father     Heart disease Father     Heart attack Father     Arrhythmia Father     Coronary artery disease Father     Coronary artery disease Sister     Early death Sister 30        heart disease    Heart disease Sister 32        MI    Heart attack Sister     Arthritis Sister     Heart disease Sister     Crohn's disease Sister     Cancer Brother         Lung cancer    Lung cancer Brother     No Known Problems Brother     No Known Problems Daughter     Blindness Son         due to accident//    Breast cancer Neg Hx     Glaucoma Neg Hx     Macular degeneration Neg Hx     Retinal detachment Neg Hx     Amblyopia Neg Hx     Diabetes Neg Hx     Cataracts Neg Hx     Hypertension Neg Hx     Strabismus Neg Hx     Stroke Neg Hx     Thyroid disease Neg Hx          Review of Systems:   No chills, fever, sweats, weight loss  She did have some shaking chills which have resolved now  No change in vision, loss of vision or diplopia  No sinus congestion, purulent nasal discharge, post nasal drip or facial pain  No pain in mouth or throat. No problems with teeth, gums.  No chest pain, palpitations, syncope  No cough, sputum  production, shortness of breath, dyspnea on exertion, pleurisy, hemoptysis  No nausea, vomiting, diarrhea, constipation, blood in stool, or focal abd pain,  No dysphagia, odynophagia  No dysuria, hematuria, strangury, retention, incontinence, nocturia, prostatism,   No vaginal/penile discharge, genital ulcers, risk for STD  No swelling of joints, redness of joints, injuries, or new focal pain  No unusual headaches, dizziness, vertigo, numbness, paresthesias, neuropathy, falls  No anxiety, depression, substance abuse, sleep disturbance  No diabetes, thyroid, hypogonadal conditions  No bleeding, lymphadenopathy, anemia, malignancy, unusual bruising  Skin complaints as above.    She suffers with bilateral groin yeast infection, her daughter attends and tries clean dry the area and apply antifungals.     No TB exposure  No recent/prior steroids  Outdoor activities:  Travel:   Implants:   Antibiotic History:     EXAM & DIAGNOSTICS REVIEWED:   Vitals:     Temp:  [97.6 °F (36.4 °C)-102.1 °F (38.9 °C)]   Temp: 97.6 °F (36.4 °C) (06/23/23 0749)  Pulse: 77 (06/23/23 0749)  Resp: 16 (06/23/23 0749)  BP: (!) 122/59 (06/23/23 0749)  SpO2: (!) 93 % (06/23/23 0749)    Intake/Output Summary (Last 24 hours) at 6/23/2023 0809  Last data filed at 6/23/2023 0130  Gross per 24 hour   Intake 100 ml   Output 600 ml   Net -500 ml       General:  In NAD. Alert and attentive, cooperative, comfortable.  Obese.  Eyes:  Anicteric, PERRL, EOMI  ENT:  No ulcers, exudates, thrush, nares patent, dentition is fair  Neck:  supple, no masses or adenopathy appreciated  Lungs: Clear, no consolidation, rales, wheezes, rub  Heart:  RRR, no gallop/murmur/rub noted  Abd:  Soft, NT, ND, normal BS, no masses or organomegaly appreciated.  :  urine clear, no flank tenderness  Musc:  Joints without effusion, swelling, erythema, synovitis, muscle wasting.   Skin:  Please see pictures of groin and anterior abdominal wall, some hyperpigmentation in bilateral  legs.  She had some pinkish spots on her back and over the left leg, looks kind of an allergic reaction.  Not itching.   Neuro:             Alert, attentive, speech fluent, face symmetric, moves all extremities, no focal weakness. Ambulatory  Psych: Calm, cooperative.  Pleasant as usual  Lymphatic:     No cervical, supraclavicular, axillary, or inguinal nodes  Extrem: No edema, erythema, phlebitis, cellulitis, warm and well perfused  VAD:       Isolation:    Wound:                       Lines/Tubes/Drains:    General Labs reviewed:  Recent Labs   Lab 06/22/23  1436 06/23/23  0331   WBC 13.89* 9.90   HGB 14.7 13.5   HCT 47.6 43.4    201       Recent Labs   Lab 06/22/23  1436 06/23/23  0331    137   K 4.7 3.3*    104   CO2 25 23   BUN 14 14   CREATININE 0.7 0.7   CALCIUM 10.3 9.4   PROT 8.0 6.8   BILITOT 0.9 0.7   ALKPHOS 92 71   ALT 22 20   AST 27 25     No results for input(s): CRP in the last 168 hours.      Micro:  Microbiology Results (last 7 days)       Procedure Component Value Units Date/Time    Blood culture x two cultures. Draw prior to antibiotics. [[id number]] Collected: 06/22/23 1436    Order Status: Completed Specimen: Blood from Peripheral, Left Updated: 06/23/23 0545     Blood Culture, Routine No Growth to date    Narrative:      Aerobic and anaerobic    Blood culture x two cultures. Draw prior to antibiotics. [[id number]] Collected: 06/22/23 1454    Order Status: Completed Specimen: Blood Updated: 06/23/23 0545     Blood Culture, Routine No Growth to date    Narrative:      Aerobic and anaerobic            Imaging Reviewed:   CXR   CT    Cardiology:    IMPRESSION & PLAN        1.         Severe anterior abdominal wall cellulitis.     2.         Bilateral Groin fungal infection .  Left worse than right    3.  Some red patches over her back and left leg.  Monitor.  Not sure if it is an allergic reaction.    4.   PMHx of obesity , BMI of 363, A-fib, CAD, MI, HTN, CHF, COPD, T2DM,A1c 5.1  COPD,    Recommendations:  Continue vancomycin iv  Add clindamycin PO q.i.d.   Add diflucan for fungal groin infection  Up-to-date with tetanus vaccine.  Elevate left abdominal pannus  Follow blood cultures     Not ready for discharge. Watch over the weekend.   Probably will discharge home on Monday on clindamycin 300 mg p.o. q.i.d. x 10days.  Follow with me in the clinic in 1 week post discharge.    This time around, I plan to prescribe chronic suppression therapy for at least 6 months..    Dr. Jacobsen is available over the weekend    Medical Decision Making during this encounter was  [_] Low Complexity  [_] Moderate Complexity  [  xx] High Complexity

## 2023-06-23 NOTE — HOSPITAL COURSE
72-year-old female with AFib on Eliquis, combined heart failure, COPD, type 2 diabetes, COPD, numerous episodes of abdominal cellulitis admitted to the ED with recurrent abdominal wall cellulitis. Also with extensive groin intertrigo. She improved on regimen of vancomycin, clindamycin, fluconazole per Infectious Disease recommendation.  Wound Care was consulted. Also utilized miconazole powder. She had nearly complete resolution of her cellulitis by time of discharge. Prescribed oral clindamycin to continue as well as the topical miconazole. Encouraged to take probiotics as well. To f/u with PCP and infectious disease clinic. By time of discharge the patient was tolerating a regular diet with resolving admission symptoms. Patient seen and examined on day of discharge.    Physical exam on day of discharge:  Constitutional:       General: She is not in acute distress.     Appearance: She is well-developed. She is obese.   HENT:      Head: Normocephalic and atraumatic.   Cardiovascular:      Rate and Rhythm: Normal rate and regular rhythm.   Pulmonary:      Effort: Pulmonary effort is normal. No respiratory distress.   Abdominal:      General: There is no distension.      Palpations: Abdomen is soft.      Tenderness: There is no abdominal tenderness.      Comments: minimal area of cellulitis over her pannus on the left side, continues improving   Skin:     General: Skin is warm and dry.      Comments: Groin intertrigo   Neurological:      General: No focal deficit present.      Mental Status: She is alert and oriented to person, place, and time. Mental status is at baseline.   Psychiatric:         Behavior: Behavior normal.

## 2023-06-23 NOTE — NURSING
0130: checked on patient during q30 rounding, assisted her to bedside commode and back to bed, made sure all devices were plugged in and call light was within reach, temperature re-check 98.8

## 2023-06-23 NOTE — ASSESSMENT & PLAN NOTE
IV vancomycin  Fluconazole  Clindamycin  Infectious disease consulted, discussed with Dr. Juan  Wound care  Patient may need to be on chronic suppressive medication again on discharge

## 2023-06-23 NOTE — PLAN OF CARE
06/23/23 0959   GARCIA Message   Medicare Outpatient and Observation Notification regarding financial responsibility Explained to patient/caregiver;Signed/date by patient/caregiver   Date GARCIA was signed 06/23/23   Time GARCIA was signed 0959

## 2023-06-23 NOTE — SUBJECTIVE & OBJECTIVE
Interval History:  Patient seen and examined.  Reports she is feeling better today.  Abdomen is less tender.  Still erythematous.  Discussed with TIARA Juan, clindamycin and fluconazole added.    Review of Systems   Constitutional:  Positive for chills, fatigue and fever.   HENT:  Negative for congestion and rhinorrhea.    Respiratory:  Negative for cough, shortness of breath and wheezing.    Cardiovascular:  Negative for chest pain, palpitations and leg swelling.   Gastrointestinal:  Negative for abdominal distention, abdominal pain, nausea and vomiting.   Endocrine: Negative for cold intolerance and heat intolerance.   Genitourinary:  Negative for dysuria and urgency.   Musculoskeletal:  Negative for arthralgias and neck stiffness.   Skin:  Positive for color change and rash.   Neurological:  Negative for dizziness and weakness.   Objective:     Vital Signs (Most Recent):  Temp: 97.6 °F (36.4 °C) (06/23/23 0749)  Pulse: 87 (06/23/23 0812)  Resp: 18 (06/23/23 0833)  BP: (!) 122/59 (06/23/23 0749)  SpO2: (!) 93 % (06/23/23 0812) Vital Signs (24h Range):  Temp:  [97.6 °F (36.4 °C)-102.1 °F (38.9 °C)] 97.6 °F (36.4 °C)  Pulse:  [] 87  Resp:  [16-24] 18  SpO2:  [93 %-95 %] 93 %  BP: (122-195)/(58-82) 122/59     Weight: 108.9 kg (240 lb)  Body mass index is 36.49 kg/m².    Intake/Output Summary (Last 24 hours) at 6/23/2023 1216  Last data filed at 6/23/2023 0130  Gross per 24 hour   Intake 100 ml   Output 600 ml   Net -500 ml         Physical Exam  Constitutional:       General: She is not in acute distress.     Appearance: She is well-developed.   HENT:      Head: Normocephalic and atraumatic.   Eyes:      Pupils: Pupils are equal, round, and reactive to light.   Cardiovascular:      Rate and Rhythm: Normal rate and regular rhythm.      Heart sounds: No murmur heard.  Pulmonary:      Effort: Pulmonary effort is normal. No respiratory distress.      Breath sounds: Normal breath sounds. No wheezing or rales.    Abdominal:      General: Bowel sounds are normal. There is no distension.      Palpations: Abdomen is soft.      Tenderness: There is no abdominal tenderness.      Comments: Large area of cellulitis over her pannus on the left side, improving   Musculoskeletal:         General: Normal range of motion.   Skin:     General: Skin is warm and dry.      Findings: No rash.   Neurological:      Mental Status: She is alert and oriented to person, place, and time.      Cranial Nerves: No cranial nerve deficit.   Psychiatric:         Behavior: Behavior normal.           Significant Labs: All pertinent labs within the past 24 hours have been reviewed.    Significant Imaging: I have reviewed all pertinent imaging results/findings within the past 24 hours.

## 2023-06-23 NOTE — PROGRESS NOTES
Pharmacokinetic Assessment Follow Up: IV Vancomycin    Vancomycin serum concentration assessment(s):    The trough level was drawn correctly and can be used to guide therapy at this time. The measurement is below the desired definitive target range of 10 to 15 mcg/mL.    Vancomycin Regimen Plan:    Change regimen to Vancomycin 1500 mg IV every 12 hours with next serum trough concentration measured at 1600 prior to 3rd dose on 6/24    Drug levels (last 3 results):  Recent Labs   Lab Result Units 06/23/23  1518   Vancomycin-Trough ug/mL 7.4*       Pharmacy will continue to follow and monitor vancomycin.    Please contact pharmacy at extension [phone] for questions regarding this assessment.    Thank you for the consult,   Halina Clemons       Patient brief summary:  Nelly Tian is a 72 y.o. female initiated on antimicrobial therapy with IV Vancomycin for treatment of skin & soft tissue infection    The patient's current regimen is 1250 mg every 12 hours    Drug Allergies:   Review of patient's allergies indicates:   Allergen Reactions    Dilaudid [hydromorphone] Anaphylaxis     Other reaction(s): Anaphylaxis  Other reaction(s): Unknown    Zyvox [linezolid in dextrose 5%] Shortness Of Breath       Actual Body Weight:   108.9    Renal Function:   Estimated Creatinine Clearance: 95.3 mL/min (based on SCr of 0.7 mg/dL).,     Dialysis Method (if applicable):  N/A    CBC (last 72 hours):  Recent Labs   Lab Result Units 06/22/23  1436 06/23/23  0331   WBC K/uL 13.89* 9.90   Hemoglobin g/dL 14.7 13.5   Hematocrit % 47.6 43.4   Platelets K/uL 192 201   Gran % % 84.9* 82.0*   Lymph % % 5.0* 9.5*   Mono % % 8.1 6.9   Eosinophil % % 1.2 0.6   Basophil % % 0.4 0.6   Differential Method  Automated Automated       Metabolic Panel (last 72 hours):  Recent Labs   Lab Result Units 06/22/23  1421 06/22/23  1436 06/23/23  0331 06/23/23  0935   Sodium mmol/L  --  139 137  --    Potassium mmol/L  --  4.7 3.3* 3.9   Chloride mmol/L   --  102 104  --    CO2 mmol/L  --  25 23  --    Glucose mg/dL  --  91 137*  --    Glucose, UA  3+*  --   --   --    BUN mg/dL  --  14 14  --    Creatinine mg/dL  --  0.7 0.7  --    Albumin g/dL  --  4.0 3.3*  --    Total Bilirubin mg/dL  --  0.9 0.7  --    Alkaline Phosphatase U/L  --  92 71  --    AST U/L  --  27 25  --    ALT U/L  --  22 20  --    Magnesium mg/dL  --   --  2.1  --    Phosphorus mg/dL  --   --  3.0  --        Vancomycin Administrations:  vancomycin given in the last 96 hours                     vancomycin 1,250 mg in dextrose 5 % (D5W) 250 mL IVPB (Vial-Mate) (mg) 1,250 mg New Bag 06/23/23 0530    vancomycin (VANCOCIN) 2,000 mg in dextrose 5 % (D5W) 500 mL IVPB (mg) 2,000 mg New Bag 06/22/23 1641                    Microbiologic Results:  Microbiology Results (last 7 days)       Procedure Component Value Units Date/Time    Blood culture x two cultures. Draw prior to antibiotics. [[id number]] Collected: 06/22/23 1436    Order Status: Completed Specimen: Blood from Peripheral, Left Updated: 06/23/23 0545     Blood Culture, Routine No Growth to date    Narrative:      Aerobic and anaerobic    Blood culture x two cultures. Draw prior to antibiotics. [[id number]] Collected: 06/22/23 1454    Order Status: Completed Specimen: Blood Updated: 06/23/23 0545     Blood Culture, Routine No Growth to date    Narrative:      Aerobic and anaerobic

## 2023-06-23 NOTE — NURSING
"0005: bedside commode at bedside, instructed pt that this is not in lieu of her needing to call staff if she wanted to get out of bed, she acknowledged understanding, questions encouraged, no questions at this time. Left room with call light in her lap. Pt even joked that she had a "built-in call light lindsey", referencing her stomach after I placed the call light on top of her. I reminded her that the big red button at the top was the button to alert staff of any needs. Acknowledged understanding. Remains aaox4. Door remains ajar, per her request. Will continue patient rounds q30min.   "

## 2023-06-24 PROBLEM — L30.4 INTERTRIGINOUS DERMATITIS ASSOCIATED WITH MOISTURE: Status: ACTIVE | Noted: 2023-06-24

## 2023-06-24 LAB
ALBUMIN SERPL BCP-MCNC: 3.1 G/DL (ref 3.5–5.2)
ALP SERPL-CCNC: 70 U/L (ref 55–135)
ALT SERPL W/O P-5'-P-CCNC: 22 U/L (ref 10–44)
ANION GAP SERPL CALC-SCNC: 8 MMOL/L (ref 8–16)
AST SERPL-CCNC: 30 U/L (ref 10–40)
BASOPHILS # BLD AUTO: 0.05 K/UL (ref 0–0.2)
BASOPHILS NFR BLD: 0.7 % (ref 0–1.9)
BILIRUB SERPL-MCNC: 0.4 MG/DL (ref 0.1–1)
BUN SERPL-MCNC: 16 MG/DL (ref 8–23)
CALCIUM SERPL-MCNC: 9.3 MG/DL (ref 8.7–10.5)
CHLORIDE SERPL-SCNC: 103 MMOL/L (ref 95–110)
CO2 SERPL-SCNC: 27 MMOL/L (ref 23–29)
CREAT SERPL-MCNC: 0.6 MG/DL (ref 0.5–1.4)
CRP SERPL-MCNC: 76.2 MG/L (ref 0–8.2)
DIFFERENTIAL METHOD: ABNORMAL
EOSINOPHIL # BLD AUTO: 0.7 K/UL (ref 0–0.5)
EOSINOPHIL NFR BLD: 8.5 % (ref 0–8)
ERYTHROCYTE [DISTWIDTH] IN BLOOD BY AUTOMATED COUNT: 14.1 % (ref 11.5–14.5)
EST. GFR  (NO RACE VARIABLE): >60 ML/MIN/1.73 M^2
GLUCOSE SERPL-MCNC: 101 MG/DL (ref 70–110)
HCT VFR BLD AUTO: 40.2 % (ref 37–48.5)
HGB BLD-MCNC: 12.6 G/DL (ref 12–16)
IMM GRANULOCYTES # BLD AUTO: 0.03 K/UL (ref 0–0.04)
IMM GRANULOCYTES NFR BLD AUTO: 0.4 % (ref 0–0.5)
LYMPHOCYTES # BLD AUTO: 1.4 K/UL (ref 1–4.8)
LYMPHOCYTES NFR BLD: 18.2 % (ref 18–48)
MAGNESIUM SERPL-MCNC: 2.2 MG/DL (ref 1.6–2.6)
MCH RBC QN AUTO: 29.9 PG (ref 27–31)
MCHC RBC AUTO-ENTMCNC: 31.3 G/DL (ref 32–36)
MCV RBC AUTO: 95 FL (ref 82–98)
MONOCYTES # BLD AUTO: 1.7 K/UL (ref 0.3–1)
MONOCYTES NFR BLD: 22.4 % (ref 4–15)
NEUTROPHILS # BLD AUTO: 3.8 K/UL (ref 1.8–7.7)
NEUTROPHILS NFR BLD: 49.8 % (ref 38–73)
NRBC BLD-RTO: 0 /100 WBC
PHOSPHATE SERPL-MCNC: 3.5 MG/DL (ref 2.7–4.5)
PLATELET # BLD AUTO: 191 K/UL (ref 150–450)
PMV BLD AUTO: 10.6 FL (ref 9.2–12.9)
POCT GLUCOSE: 75 MG/DL (ref 70–110)
POCT GLUCOSE: 79 MG/DL (ref 70–110)
POCT GLUCOSE: 93 MG/DL (ref 70–110)
POCT GLUCOSE: 95 MG/DL (ref 70–110)
POTASSIUM SERPL-SCNC: 3.9 MMOL/L (ref 3.5–5.1)
PROT SERPL-MCNC: 6.5 G/DL (ref 6–8.4)
RBC # BLD AUTO: 4.22 M/UL (ref 4–5.4)
SODIUM SERPL-SCNC: 138 MMOL/L (ref 136–145)
VANCOMYCIN TROUGH SERPL-MCNC: 14.5 UG/ML (ref 10–22)
WBC # BLD AUTO: 7.68 K/UL (ref 3.9–12.7)

## 2023-06-24 PROCEDURE — 85025 COMPLETE CBC W/AUTO DIFF WBC: CPT | Performed by: HOSPITALIST

## 2023-06-24 PROCEDURE — 80202 ASSAY OF VANCOMYCIN: CPT | Performed by: HOSPITALIST

## 2023-06-24 PROCEDURE — 84100 ASSAY OF PHOSPHORUS: CPT | Performed by: HOSPITALIST

## 2023-06-24 PROCEDURE — 25000003 PHARM REV CODE 250: Performed by: INTERNAL MEDICINE

## 2023-06-24 PROCEDURE — 12000002 HC ACUTE/MED SURGE SEMI-PRIVATE ROOM

## 2023-06-24 PROCEDURE — 63600175 PHARM REV CODE 636 W HCPCS: Performed by: HOSPITALIST

## 2023-06-24 PROCEDURE — 86140 C-REACTIVE PROTEIN: CPT | Performed by: INTERNAL MEDICINE

## 2023-06-24 PROCEDURE — 25000003 PHARM REV CODE 250: Performed by: NURSE PRACTITIONER

## 2023-06-24 PROCEDURE — 36415 COLL VENOUS BLD VENIPUNCTURE: CPT | Performed by: HOSPITALIST

## 2023-06-24 PROCEDURE — 94761 N-INVAS EAR/PLS OXIMETRY MLT: CPT

## 2023-06-24 PROCEDURE — 83735 ASSAY OF MAGNESIUM: CPT | Performed by: HOSPITALIST

## 2023-06-24 PROCEDURE — 25000003 PHARM REV CODE 250: Performed by: STUDENT IN AN ORGANIZED HEALTH CARE EDUCATION/TRAINING PROGRAM

## 2023-06-24 PROCEDURE — 63600175 PHARM REV CODE 636 W HCPCS: Performed by: STUDENT IN AN ORGANIZED HEALTH CARE EDUCATION/TRAINING PROGRAM

## 2023-06-24 PROCEDURE — 25000003 PHARM REV CODE 250: Performed by: HOSPITALIST

## 2023-06-24 PROCEDURE — 27000221 HC OXYGEN, UP TO 24 HOURS

## 2023-06-24 PROCEDURE — 63700000 PHARM REV CODE 250 ALT 637 W/O HCPCS: Performed by: INTERNAL MEDICINE

## 2023-06-24 PROCEDURE — 80053 COMPREHEN METABOLIC PANEL: CPT | Performed by: HOSPITALIST

## 2023-06-24 RX ORDER — ADHESIVE BANDAGE
30 BANDAGE TOPICAL DAILY PRN
Status: DISCONTINUED | OUTPATIENT
Start: 2023-06-24 | End: 2023-06-26 | Stop reason: HOSPADM

## 2023-06-24 RX ORDER — MAG HYDROX/ALUMINUM HYD/SIMETH 200-200-20
30 SUSPENSION, ORAL (FINAL DOSE FORM) ORAL ONCE
Status: COMPLETED | OUTPATIENT
Start: 2023-06-24 | End: 2023-06-24

## 2023-06-24 RX ORDER — FUROSEMIDE 40 MG/1
40 TABLET ORAL DAILY
Status: DISCONTINUED | OUTPATIENT
Start: 2023-06-24 | End: 2023-06-26 | Stop reason: HOSPADM

## 2023-06-24 RX ORDER — SPIRONOLACTONE 25 MG/1
25 TABLET ORAL DAILY
Status: DISCONTINUED | OUTPATIENT
Start: 2023-06-24 | End: 2023-06-26 | Stop reason: HOSPADM

## 2023-06-24 RX ADMIN — LACTOBACILLUS TAB 4 TABLET: TAB at 12:06

## 2023-06-24 RX ADMIN — VANCOMYCIN HYDROCHLORIDE 1500 MG: 1.5 INJECTION, POWDER, LYOPHILIZED, FOR SOLUTION INTRAVENOUS at 05:06

## 2023-06-24 RX ADMIN — CLINDAMYCIN HYDROCHLORIDE 300 MG: 150 CAPSULE ORAL at 12:06

## 2023-06-24 RX ADMIN — FUROSEMIDE 40 MG: 40 TABLET ORAL at 04:06

## 2023-06-24 RX ADMIN — ACETAMINOPHEN 650 MG: 325 TABLET ORAL at 05:06

## 2023-06-24 RX ADMIN — ALUMINUM HYDROXIDE, MAGNESIUM HYDROXIDE, AND SIMETHICONE 30 ML: 200; 200; 20 SUSPENSION ORAL at 09:06

## 2023-06-24 RX ADMIN — HYPROMELLOSE 2910 1 DROP: 5 SOLUTION/ DROPS OPHTHALMIC at 12:06

## 2023-06-24 RX ADMIN — FLUCONAZOLE 200 MG: 100 TABLET ORAL at 08:06

## 2023-06-24 RX ADMIN — APIXABAN 5 MG: 2.5 TABLET, FILM COATED ORAL at 08:06

## 2023-06-24 RX ADMIN — APIXABAN 5 MG: 2.5 TABLET, FILM COATED ORAL at 09:06

## 2023-06-24 RX ADMIN — MAGNESIUM HYDROXIDE 2400 MG: 400 SUSPENSION ORAL at 12:06

## 2023-06-24 RX ADMIN — CLINDAMYCIN HYDROCHLORIDE 300 MG: 150 CAPSULE ORAL at 05:06

## 2023-06-24 RX ADMIN — SPIRONOLACTONE 25 MG: 25 TABLET ORAL at 04:06

## 2023-06-24 RX ADMIN — VANCOMYCIN HYDROCHLORIDE 1250 MG: 1.25 INJECTION, POWDER, LYOPHILIZED, FOR SOLUTION INTRAVENOUS at 05:06

## 2023-06-24 RX ADMIN — LACTOBACILLUS TAB 4 TABLET: TAB at 05:06

## 2023-06-24 NOTE — ASSESSMENT & PLAN NOTE
Patient is identified as having Combined Systolic and Diastolic heart failure that is Chronic. CHF is currently controlled. Latest ECHO performed and demonstrates- Results for orders placed during the hospital encounter of 01/31/23    Echo Saline Bubble? No    Interpretation Summary  · Moderate left atrial enlargement.  · Mild pulmonic regurgitation.  · Mild mitral regurgitation.  · A diastolic pattern consistent with atrial fibrillation observed.  · Limited study; 4 chamber 2chamber long axis, 2 chamber long axis views not seen, suggest repeat study  .Monitor clinical status closely. Monitor on telemetry. Patient is off CHF pathway.  Monitor strict Is&Os and daily weights.  Place on fluid restriction of 1.5 L. Continue to stress to patient importance of self efficacy and  on diet for CHF.   - continue home lasix and aldactone

## 2023-06-24 NOTE — PROGRESS NOTES
[facility] Hospital Medicine  Progress Note    Patient Name: Nelly Tian  MRN: [id number]  Patient Class: IP- Inpatient   Admission Date: 6/22/2023  Length of Stay: 0 days  Attending Physician: Brennan Wagner MD  Primary Care Provider: Constantine Bird MD        Subjective:     Principal Problem:Abdominal wall cellulitis        HPI:  Patient is a 72-year-old female with history of AFib on Eliquis, combined heart failure, COPD, type 2 diabetes, COPD, numerous episodes of abdominal cellulitis presents to the ED with complaint recurrent abdominal wall cellulitis.  Patient reports she was feeling well yesterday but today began to have fever and chills and pain over the left side of her abdominal wall.  She notes some skin breakdown in the area that she thinks the infection entered through.  She denies any injury to the area.  She reports she is not been on suppressive antibiotic for many months.  In the past had been on suppressive cefadroxil per Dr. Juan.  In the ED she is febrile with an elevated white count and lactic acid.  She will be admitted for sepsis secondary to wall cellulitis.  Infectious Disease will be consulted.  She will be given IV vancomycin.      Overview/Hospital Course:  72-year-old female with AFib on Eliquis, combined heart failure, COPD, type 2 diabetes, COPD, numerous episodes of abdominal cellulitis admitted to the ED with recurrent abdominal wall cellulitis. Also with extensive groin intertrigo. She improved on regimen vancomycin, clindamycin, fluconazole per Infectious Disease recommendation.  Wound Care is consulted.      Interval History: Patient seen and examined. ELINEON. Reporting lessening redness.      Objective:     Vital Signs (Most Recent):  Temp: 98.2 °F (36.8 °C) (06/24/23 1139)  Pulse: 60 (06/24/23 1139)  Resp: 18 (06/24/23 1139)  BP: 134/63 (06/24/23 1139)  SpO2: 95 % (06/24/23 1139) Vital Signs (24h Range):  Temp:  [37.4 °F (3 °C)-98.9 °F (37.2 °C)] 98.2  °F (36.8 °C)  Pulse:  [48-73] 60  Resp:  [18-20] 18  SpO2:  [93 %-99 %] 95 %  BP: (114-134)/(58-63) 134/63     Weight: 108.9 kg (240 lb)  Body mass index is 36.49 kg/m².    Intake/Output Summary (Last 24 hours) at 6/24/2023 1423  Last data filed at 6/23/2023 2157  Gross per 24 hour   Intake 240 ml   Output 675 ml   Net -435 ml         Physical Exam  Constitutional:       General: She is not in acute distress.     Appearance: She is well-developed.   HENT:      Head: Normocephalic and atraumatic.   Cardiovascular:      Rate and Rhythm: Normal rate and regular rhythm.   Pulmonary:      Effort: Pulmonary effort is normal. No respiratory distress.   Abdominal:      General: Bowel sounds are normal. There is no distension.      Palpations: Abdomen is soft.      Tenderness: There is no abdominal tenderness.      Comments: Large area of cellulitis over her pannus on the left side, improving   Skin:     General: Skin is warm and dry.      Comments: Groin intertrigo   Neurological:      General: No focal deficit present.      Mental Status: She is alert and oriented to person, place, and time. Mental status is at baseline.   Psychiatric:         Behavior: Behavior normal.           Significant Labs: All pertinent labs within the past 24 hours have been reviewed.    Significant Imaging: I have reviewed all pertinent imaging results/findings within the past 24 hours.      Assessment/Plan:      * Abdominal wall cellulitis  Recurrent   - continue regimen per ID: vancomycin, Fluconazole, Clindamycin  - Infectious disease following  - Wound care    Intertriginous dermatitis associated with moisture  - diflucan  - wound care    Chronic combined systolic and diastolic CHF (congestive heart failure)  Patient is identified as having Combined Systolic and Diastolic heart failure that is Chronic. CHF is currently controlled. Latest ECHO performed and demonstrates- Results for orders placed during the hospital encounter of  01/31/23    Echo Saline Bubble? No    Interpretation Summary  · Moderate left atrial enlargement.  · Mild pulmonic regurgitation.  · Mild mitral regurgitation.  · A diastolic pattern consistent with atrial fibrillation observed.  · Limited study; 4 chamber 2chamber long axis, 2 chamber long axis views not seen, suggest repeat study  .Monitor clinical status closely. Monitor on telemetry. Patient is off CHF pathway.  Monitor strict Is&Os and daily weights.  Place on fluid restriction of 1.5 L. Continue to stress to patient importance of self efficacy and  on diet for CHF.   - continue home lasix and aldactone      Hyperlipidemia associated with type 2 diabetes mellitus   Patient is chronically on statin.will continue for now. Monitor clinically. Last LDL was   Lab Results   Component Value Date    LDLCALC 41.6 (L) 03/25/2022       Type 2 diabetes mellitus with diabetic neuropathy, without long-term current use of insulin  Patient's FSGs are controlled on current medication regimen.  Last A1c reviewed-   Lab Results   Component Value Date    HGBA1C 5.4 04/27/2023     Most recent fingerstick glucose reviewed-   Recent Labs   Lab 06/23/23  1638 06/23/23  2121 06/24/23  0740 06/24/23  1144   POCTGLUCOSE 120* 101 93 95     Current correctional scale  Medium  Maintain anti-hyperglycemic dose as follows-   Antihyperglycemics (From admission, onward)    Start     Stop Route Frequency Ordered    06/22/23 1828  insulin aspart U-100 pen 1-10 Units         -- SubQ Before meals & nightly PRN 06/22/23 1828        Hold Oral hypoglycemics while patient is in the hospital.    Hypertension associated with diabetes  Chronic, controlled.  Latest blood pressure and vitals reviewed-     Temp:  [37.4 °F (3 °C)-98.9 °F (37.2 °C)]   Pulse:  [48-73]   Resp:  [18-20]   BP: (114-134)/(58-63)   SpO2:  [93 %-99 %] .   Home meds for hypertension were reviewed and noted below.   Hypertension Medications             furosemide (LASIX) 40 MG  tablet TAKE 1 TABLET(40 MG) BY MOUTH EVERY DAY    spironolactone (ALDACTONE) 25 MG tablet Take 1 tablet (25 mg total) by mouth once daily.          While in the hospital, will manage blood pressure as follows; Continue home antihypertensive regimen    Will utilize p.r.n. blood pressure medication only if patient's blood pressure greater than 180/110 and she develops symptoms such as worsening chest pain or shortness of breath.      Chronic atrial fibrillation  Chronic, stable  No rate/rhythm control meds noted  - Telemetry monitoring  - Continue anticoagulation with Eliquis    Severe obesity (BMI 35.0-35.9 with comorbidity)  Body mass index is 36.49 kg/m². Morbid obesity complicates all aspects of disease management from diagnostic modalities to treatment.       VTE Risk Mitigation (From admission, onward)         Ordered     apixaban tablet 5 mg  2 times daily         06/22/23 1828     IP VTE HIGH RISK PATIENT  Once         06/22/23 1828     Place sequential compression device  Until discontinued         06/22/23 1828     Reason for No Pharmacological VTE Prophylaxis  Once        Question:  Reasons:  Answer:  Already adequately anticoagulated on oral Anticoagulants    06/22/23 1828                Discharge Planning   KRISTOPHER:  6/25-6/26    Code Status: DNR   Is the patient medically ready for discharge?:     Reason for patient still in hospital (select all that apply): Patient trending condition and Consult recommendations  Discharge Plan A: Home                  Brennan Wagner MD  Department of Hospital Medicine   [facility]

## 2023-06-24 NOTE — ASSESSMENT & PLAN NOTE
Chronic, stable  No rate/rhythm control meds noted  - Telemetry monitoring  - Continue anticoagulation with Eliquis

## 2023-06-24 NOTE — ASSESSMENT & PLAN NOTE
Chronic, controlled.  Latest blood pressure and vitals reviewed-     Temp:  [37.4 °F (3 °C)-98.9 °F (37.2 °C)]   Pulse:  [48-73]   Resp:  [18-20]   BP: (114-134)/(58-63)   SpO2:  [93 %-99 %] .   Home meds for hypertension were reviewed and noted below.   Hypertension Medications             furosemide (LASIX) 40 MG tablet TAKE 1 TABLET(40 MG) BY MOUTH EVERY DAY    spironolactone (ALDACTONE) 25 MG tablet Take 1 tablet (25 mg total) by mouth once daily.          While in the hospital, will manage blood pressure as follows; Continue home antihypertensive regimen    Will utilize p.r.n. blood pressure medication only if patient's blood pressure greater than 180/110 and she develops symptoms such as worsening chest pain or shortness of breath.

## 2023-06-24 NOTE — ASSESSMENT & PLAN NOTE
Patient's FSGs are controlled on current medication regimen.  Last A1c reviewed-   Lab Results   Component Value Date    HGBA1C 5.4 04/27/2023     Most recent fingerstick glucose reviewed-   Recent Labs   Lab 06/23/23  1638 06/23/23  2121 06/24/23  0740 06/24/23  1144   POCTGLUCOSE 120* 101 93 95     Current correctional scale  Medium  Maintain anti-hyperglycemic dose as follows-   Antihyperglycemics (From admission, onward)    Start     Stop Route Frequency Ordered    06/22/23 1828  insulin aspart U-100 pen 1-10 Units         -- SubQ Before meals & nightly PRN 06/22/23 1828        Hold Oral hypoglycemics while patient is in the hospital.

## 2023-06-24 NOTE — SUBJECTIVE & OBJECTIVE
Interval History: Patient seen and examined. ELINJACOBON. Reporting lessening redness.      Objective:     Vital Signs (Most Recent):  Temp: 98.2 °F (36.8 °C) (06/24/23 1139)  Pulse: 60 (06/24/23 1139)  Resp: 18 (06/24/23 1139)  BP: 134/63 (06/24/23 1139)  SpO2: 95 % (06/24/23 1139) Vital Signs (24h Range):  Temp:  [37.4 °F (3 °C)-98.9 °F (37.2 °C)] 98.2 °F (36.8 °C)  Pulse:  [48-73] 60  Resp:  [18-20] 18  SpO2:  [93 %-99 %] 95 %  BP: (114-134)/(58-63) 134/63     Weight: 108.9 kg (240 lb)  Body mass index is 36.49 kg/m².    Intake/Output Summary (Last 24 hours) at 6/24/2023 1423  Last data filed at 6/23/2023 2157  Gross per 24 hour   Intake 240 ml   Output 675 ml   Net -435 ml         Physical Exam  Constitutional:       General: She is not in acute distress.     Appearance: She is well-developed.   HENT:      Head: Normocephalic and atraumatic.   Cardiovascular:      Rate and Rhythm: Normal rate and regular rhythm.   Pulmonary:      Effort: Pulmonary effort is normal. No respiratory distress.   Abdominal:      General: Bowel sounds are normal. There is no distension.      Palpations: Abdomen is soft.      Tenderness: There is no abdominal tenderness.      Comments: Large area of cellulitis over her pannus on the left side, improving   Skin:     General: Skin is warm and dry.      Comments: Groin intertrigo   Neurological:      General: No focal deficit present.      Mental Status: She is alert and oriented to person, place, and time. Mental status is at baseline.   Psychiatric:         Behavior: Behavior normal.           Significant Labs: All pertinent labs within the past 24 hours have been reviewed.    Significant Imaging: I have reviewed all pertinent imaging results/findings within the past 24 hours.

## 2023-06-24 NOTE — PROGRESS NOTES
Pharmacokinetic Assessment Follow Up: IV Vancomycin    Vancomycin serum concentration assessment(s):    The trough level was drawn correctly and can be used to guide therapy at this time. The measurement is within the desired definitive target range of 10 to 15 mcg/mL.    Vancomycin Regimen Plan:    Change regimen to Vancomycin 1250 mg IV every 12 hours with next serum trough concentration measured at 1600 prior to 3rd dose on 6/25/23    Drug levels (last 3 results):  Recent Labs   Lab Result Units 06/23/23  1518 06/24/23  1618   Vancomycin-Trough ug/mL 7.4* 14.5       Pharmacy will continue to follow and monitor vancomycin.    Please contact pharmacy at extension [phone] for questions regarding this assessment.    Thank you for the consult,   Everton Purcell, PharmD  [phone]         Patient brief summary:  Nelly Tian is a 72 y.o. female initiated on antimicrobial therapy with IV Vancomycin for treatment of skin & soft tissue infection    The patient's current regimen is vancomycin 1500 mg every 12 hours.    Drug Allergies:   Review of patient's allergies indicates:   Allergen Reactions    Dilaudid [hydromorphone] Anaphylaxis     Other reaction(s): Anaphylaxis  Other reaction(s): Unknown    Zyvox [linezolid in dextrose 5%] Shortness Of Breath       Actual Body Weight:   108.9 kg (240 lb)    Renal Function:   Estimated Creatinine Clearance: 111.2 mL/min (based on SCr of 0.6 mg/dL).,     Dialysis Method (if applicable):  N/A    CBC (last 72 hours):  Recent Labs   Lab Result Units 06/22/23  1436 06/23/23  0331 06/24/23  0439   WBC K/uL 13.89* 9.90 7.68   Hemoglobin g/dL 14.7 13.5 12.6   Hematocrit % 47.6 43.4 40.2   Platelets K/uL 192 201 191   Gran % % 84.9* 82.0* 49.8   Lymph % % 5.0* 9.5* 18.2   Mono % % 8.1 6.9 22.4*   Eosinophil % % 1.2 0.6 8.5*   Basophil % % 0.4 0.6 0.7   Differential Method  Automated Automated Automated       Metabolic Panel (last 72 hours):  Recent Labs   Lab Result Units  06/22/23  1421 06/22/23  1436 06/23/23  0331 06/23/23  0935 06/24/23  0439   Sodium mmol/L  --  139 137  --  138   Potassium mmol/L  --  4.7 3.3* 3.9 3.9   Chloride mmol/L  --  102 104  --  103   CO2 mmol/L  --  25 23  --  27   Glucose mg/dL  --  91 137*  --  101   Glucose, UA  3+*  --   --   --   --    BUN mg/dL  --  14 14  --  16   Creatinine mg/dL  --  0.7 0.7  --  0.6   Albumin g/dL  --  4.0 3.3*  --  3.1*   Total Bilirubin mg/dL  --  0.9 0.7  --  0.4   Alkaline Phosphatase U/L  --  92 71  --  70   AST U/L  --  27 25  --  30   ALT U/L  --  22 20  --  22   Magnesium mg/dL  --   --  2.1  --  2.2   Phosphorus mg/dL  --   --  3.0  --  3.5       Vancomycin Administrations:  vancomycin given in the last 96 hours                     vancomycin 1,500 mg in dextrose 5 % (D5W) 250 mL IVPB (Vial-Mate) (mg) 1,500 mg New Bag 06/24/23 0518     1,500 mg New Bag 06/23/23 1644    vancomycin 1,250 mg in dextrose 5 % (D5W) 250 mL IVPB (Vial-Mate) (mg) 1,250 mg New Bag 06/23/23 0530    vancomycin (VANCOCIN) 2,000 mg in dextrose 5 % (D5W) 500 mL IVPB (mg) 2,000 mg New Bag 06/22/23 1641                    Microbiologic Results:  Microbiology Results (last 7 days)       Procedure Component Value Units Date/Time    Blood culture x two cultures. Draw prior to antibiotics. [[id number]] Collected: 06/22/23 1436    Order Status: Completed Specimen: Blood from Peripheral, Left Updated: 06/24/23 0612     Blood Culture, Routine No Growth to date      No Growth to date    Narrative:      Aerobic and anaerobic    Blood culture x two cultures. Draw prior to antibiotics. [[id number]] Collected: 06/22/23 1454    Order Status: Completed Specimen: Blood Updated: 06/24/23 0612     Blood Culture, Routine No Growth to date      No Growth to date    Narrative:      Aerobic and anaerobic

## 2023-06-24 NOTE — PLAN OF CARE
Problem: Adult Inpatient Plan of Care  Goal: Plan of Care Review  Outcome: Ongoing, Progressing  Goal: Patient-Specific Goal (Individualized)  Outcome: Ongoing, Progressing     Problem: Diabetes Comorbidity  Goal: Blood Glucose Level Within Targeted Range  Outcome: Ongoing, Progressing     Problem: Fall Injury Risk  Goal: Absence of Fall and Fall-Related Injury  Outcome: Ongoing, Progressing     Problem: Infection  Goal: Absence of Infection Signs and Symptoms  Outcome: Ongoing, Progressing     Problem: Skin Injury Risk Increased  Goal: Skin Health and Integrity  Outcome: Ongoing, Progressing

## 2023-06-24 NOTE — ASSESSMENT & PLAN NOTE
Recurrent   - continue regimen per ID: vancomycin, Fluconazole, Clindamycin  - Infectious disease following  - Wound care

## 2023-06-25 PROBLEM — J96.21 ACUTE ON CHRONIC RESPIRATORY FAILURE WITH HYPOXIA: Status: RESOLVED | Noted: 2017-04-23 | Resolved: 2023-06-25

## 2023-06-25 LAB
ALBUMIN SERPL BCP-MCNC: 3.2 G/DL (ref 3.5–5.2)
ALP SERPL-CCNC: 67 U/L (ref 55–135)
ALT SERPL W/O P-5'-P-CCNC: 26 U/L (ref 10–44)
ANION GAP SERPL CALC-SCNC: 10 MMOL/L (ref 8–16)
AST SERPL-CCNC: 29 U/L (ref 10–40)
BASOPHILS # BLD AUTO: 0.08 K/UL (ref 0–0.2)
BASOPHILS NFR BLD: 0.9 % (ref 0–1.9)
BILIRUB SERPL-MCNC: 0.5 MG/DL (ref 0.1–1)
BUN SERPL-MCNC: 18 MG/DL (ref 8–23)
CALCIUM SERPL-MCNC: 9.3 MG/DL (ref 8.7–10.5)
CHLORIDE SERPL-SCNC: 101 MMOL/L (ref 95–110)
CO2 SERPL-SCNC: 29 MMOL/L (ref 23–29)
CREAT SERPL-MCNC: 0.7 MG/DL (ref 0.5–1.4)
DIFFERENTIAL METHOD: ABNORMAL
EOSINOPHIL # BLD AUTO: 1 K/UL (ref 0–0.5)
EOSINOPHIL NFR BLD: 10.9 % (ref 0–8)
ERYTHROCYTE [DISTWIDTH] IN BLOOD BY AUTOMATED COUNT: 13.9 % (ref 11.5–14.5)
EST. GFR  (NO RACE VARIABLE): >60 ML/MIN/1.73 M^2
GLUCOSE SERPL-MCNC: 100 MG/DL (ref 70–110)
HCT VFR BLD AUTO: 40.5 % (ref 37–48.5)
HGB BLD-MCNC: 12.7 G/DL (ref 12–16)
IMM GRANULOCYTES # BLD AUTO: 0.03 K/UL (ref 0–0.04)
IMM GRANULOCYTES NFR BLD AUTO: 0.3 % (ref 0–0.5)
LYMPHOCYTES # BLD AUTO: 1.5 K/UL (ref 1–4.8)
LYMPHOCYTES NFR BLD: 16.8 % (ref 18–48)
MAGNESIUM SERPL-MCNC: 2.1 MG/DL (ref 1.6–2.6)
MCH RBC QN AUTO: 29.4 PG (ref 27–31)
MCHC RBC AUTO-ENTMCNC: 31.4 G/DL (ref 32–36)
MCV RBC AUTO: 94 FL (ref 82–98)
MONOCYTES # BLD AUTO: 1.4 K/UL (ref 0.3–1)
MONOCYTES NFR BLD: 15.7 % (ref 4–15)
NEUTROPHILS # BLD AUTO: 4.8 K/UL (ref 1.8–7.7)
NEUTROPHILS NFR BLD: 55.4 % (ref 38–73)
NRBC BLD-RTO: 0 /100 WBC
PHOSPHATE SERPL-MCNC: 3.8 MG/DL (ref 2.7–4.5)
PLATELET # BLD AUTO: 189 K/UL (ref 150–450)
PMV BLD AUTO: 10.3 FL (ref 9.2–12.9)
POCT GLUCOSE: 77 MG/DL (ref 70–110)
POCT GLUCOSE: 80 MG/DL (ref 70–110)
POTASSIUM SERPL-SCNC: 3.8 MMOL/L (ref 3.5–5.1)
PROT SERPL-MCNC: 6.8 G/DL (ref 6–8.4)
RBC # BLD AUTO: 4.32 M/UL (ref 4–5.4)
SODIUM SERPL-SCNC: 140 MMOL/L (ref 136–145)
VANCOMYCIN TROUGH SERPL-MCNC: 16.1 UG/ML (ref 10–22)
WBC # BLD AUTO: 8.7 K/UL (ref 3.9–12.7)

## 2023-06-25 PROCEDURE — 25000003 PHARM REV CODE 250: Performed by: NURSE PRACTITIONER

## 2023-06-25 PROCEDURE — 85025 COMPLETE CBC W/AUTO DIFF WBC: CPT | Performed by: HOSPITALIST

## 2023-06-25 PROCEDURE — 25000003 PHARM REV CODE 250: Performed by: STUDENT IN AN ORGANIZED HEALTH CARE EDUCATION/TRAINING PROGRAM

## 2023-06-25 PROCEDURE — 80053 COMPREHEN METABOLIC PANEL: CPT | Performed by: HOSPITALIST

## 2023-06-25 PROCEDURE — 36415 COLL VENOUS BLD VENIPUNCTURE: CPT | Performed by: STUDENT IN AN ORGANIZED HEALTH CARE EDUCATION/TRAINING PROGRAM

## 2023-06-25 PROCEDURE — 94761 N-INVAS EAR/PLS OXIMETRY MLT: CPT

## 2023-06-25 PROCEDURE — 83735 ASSAY OF MAGNESIUM: CPT | Performed by: HOSPITALIST

## 2023-06-25 PROCEDURE — 80202 ASSAY OF VANCOMYCIN: CPT | Performed by: STUDENT IN AN ORGANIZED HEALTH CARE EDUCATION/TRAINING PROGRAM

## 2023-06-25 PROCEDURE — 63600175 PHARM REV CODE 636 W HCPCS: Performed by: STUDENT IN AN ORGANIZED HEALTH CARE EDUCATION/TRAINING PROGRAM

## 2023-06-25 PROCEDURE — 12000002 HC ACUTE/MED SURGE SEMI-PRIVATE ROOM

## 2023-06-25 PROCEDURE — 25000003 PHARM REV CODE 250: Performed by: INTERNAL MEDICINE

## 2023-06-25 PROCEDURE — 25000003 PHARM REV CODE 250: Performed by: HOSPITALIST

## 2023-06-25 PROCEDURE — 63700000 PHARM REV CODE 250 ALT 637 W/O HCPCS: Performed by: INTERNAL MEDICINE

## 2023-06-25 PROCEDURE — 84100 ASSAY OF PHOSPHORUS: CPT | Performed by: HOSPITALIST

## 2023-06-25 PROCEDURE — 36415 COLL VENOUS BLD VENIPUNCTURE: CPT | Performed by: HOSPITALIST

## 2023-06-25 RX ORDER — MAG HYDROX/ALUMINUM HYD/SIMETH 200-200-20
30 SUSPENSION, ORAL (FINAL DOSE FORM) ORAL EVERY 4 HOURS PRN
Status: DISCONTINUED | OUTPATIENT
Start: 2023-06-25 | End: 2023-06-26 | Stop reason: HOSPADM

## 2023-06-25 RX ORDER — MICONAZOLE NITRATE 2 %
POWDER (GRAM) TOPICAL DAILY
Status: DISCONTINUED | OUTPATIENT
Start: 2023-06-25 | End: 2023-06-26 | Stop reason: HOSPADM

## 2023-06-25 RX ADMIN — ALUMINUM HYDROXIDE, MAGNESIUM HYDROXIDE, AND SIMETHICONE 30 ML: 200; 200; 20 SUSPENSION ORAL at 06:06

## 2023-06-25 RX ADMIN — ACETAMINOPHEN 650 MG: 325 TABLET ORAL at 11:06

## 2023-06-25 RX ADMIN — ALUMINUM HYDROXIDE, MAGNESIUM HYDROXIDE, AND SIMETHICONE 30 ML: 200; 200; 20 SUSPENSION ORAL at 11:06

## 2023-06-25 RX ADMIN — HYDROCODONE BITARTRATE AND ACETAMINOPHEN 1 TABLET: 5; 325 TABLET ORAL at 05:06

## 2023-06-25 RX ADMIN — VANCOMYCIN HYDROCHLORIDE 1250 MG: 1.25 INJECTION, POWDER, LYOPHILIZED, FOR SOLUTION INTRAVENOUS at 05:06

## 2023-06-25 RX ADMIN — VANCOMYCIN HYDROCHLORIDE 1000 MG: 1 INJECTION, POWDER, LYOPHILIZED, FOR SOLUTION INTRAVENOUS at 06:06

## 2023-06-25 RX ADMIN — LACTOBACILLUS TAB 4 TABLET: TAB at 05:06

## 2023-06-25 RX ADMIN — CLINDAMYCIN HYDROCHLORIDE 300 MG: 150 CAPSULE ORAL at 05:06

## 2023-06-25 RX ADMIN — CLINDAMYCIN HYDROCHLORIDE 300 MG: 150 CAPSULE ORAL at 01:06

## 2023-06-25 RX ADMIN — HYDROCODONE BITARTRATE AND ACETAMINOPHEN 1 TABLET: 10; 325 TABLET ORAL at 09:06

## 2023-06-25 RX ADMIN — APIXABAN 5 MG: 2.5 TABLET, FILM COATED ORAL at 09:06

## 2023-06-25 RX ADMIN — MICONAZOLE NITRATE: 20 POWDER TOPICAL at 02:06

## 2023-06-25 RX ADMIN — LACTOBACILLUS TAB 4 TABLET: TAB at 11:06

## 2023-06-25 RX ADMIN — LACTOBACILLUS TAB 4 TABLET: TAB at 09:06

## 2023-06-25 RX ADMIN — FLUCONAZOLE 200 MG: 100 TABLET ORAL at 09:06

## 2023-06-25 RX ADMIN — CLINDAMYCIN HYDROCHLORIDE 300 MG: 150 CAPSULE ORAL at 11:06

## 2023-06-25 RX ADMIN — ACETAMINOPHEN 650 MG: 325 TABLET ORAL at 02:06

## 2023-06-25 RX ADMIN — ATORVASTATIN CALCIUM 10 MG: 10 TABLET, FILM COATED ORAL at 09:06

## 2023-06-25 RX ADMIN — APIXABAN 5 MG: 2.5 TABLET, FILM COATED ORAL at 08:06

## 2023-06-25 NOTE — PLAN OF CARE
POC discussed with patient, verbalized understanding. Patient with uneventful night, slept well between care. VS stable. Up to bedside commode multiple times to void. Medicated with Hydrocodone for complaints of generalized discomfort. Triad at bedside to apply to abdomin. Air pump on bed. ABX received per Mid line. Call light at bedside. Bed alarm refused.

## 2023-06-25 NOTE — ASSESSMENT & PLAN NOTE
Patient's FSGs are controlled on current medication regimen.  Last A1c reviewed-   Lab Results   Component Value Date    HGBA1C 5.4 04/27/2023     Most recent fingerstick glucose reviewed-   Recent Labs   Lab 06/24/23  1619 06/24/23  2101 06/25/23  0800 06/25/23  1150   POCTGLUCOSE 79 75 80 77     Current correctional scale  Medium  Maintain anti-hyperglycemic dose as follows-   Antihyperglycemics (From admission, onward)    Start     Stop Route Frequency Ordered    06/22/23 1828  insulin aspart U-100 pen 1-10 Units         -- SubQ Before meals & nightly PRN 06/22/23 1828        Hold Oral hypoglycemics while patient is in the hospital.

## 2023-06-25 NOTE — SUBJECTIVE & OBJECTIVE
Interval History: Patient seen and examined. ZACHARY. Continued improvement.      Objective:     Vital Signs (Most Recent):  Temp: 97.3 °F (36.3 °C) (06/25/23 1121)  Pulse: (!) 49 (06/25/23 1121)  Resp: 16 (06/25/23 1121)  BP: 124/60 (06/25/23 1121)  SpO2: 96 % (06/25/23 1121) Vital Signs (24h Range):  Temp:  [97.3 °F (36.3 °C)-98.3 °F (36.8 °C)] 97.3 °F (36.3 °C)  Pulse:  [46-62] 49  Resp:  [16-18] 16  SpO2:  [93 %-98 %] 96 %  BP: (122-146)/(58-64) 124/60     Weight: 108.9 kg (240 lb)  Body mass index is 36.49 kg/m².    Intake/Output Summary (Last 24 hours) at 6/25/2023 1337  Last data filed at 6/25/2023 0734  Gross per 24 hour   Intake 320 ml   Output 420 ml   Net -100 ml         Physical Exam  Constitutional:       General: She is not in acute distress.     Appearance: She is well-developed. She is obese.   HENT:      Head: Normocephalic and atraumatic.   Cardiovascular:      Rate and Rhythm: Normal rate and regular rhythm.   Pulmonary:      Effort: Pulmonary effort is normal. No respiratory distress.   Abdominal:      General: Bowel sounds are normal. There is no distension.      Palpations: Abdomen is soft.      Tenderness: There is no abdominal tenderness.      Comments: Large area of cellulitis over her pannus on the left side, continues improving   Skin:     General: Skin is warm and dry.      Comments: Groin intertrigo   Neurological:      General: No focal deficit present.      Mental Status: She is alert and oriented to person, place, and time. Mental status is at baseline.   Psychiatric:         Behavior: Behavior normal.           Significant Labs: All pertinent labs within the past 24 hours have been reviewed.    Significant Imaging: I have reviewed all pertinent imaging results/findings within the past 24 hours.

## 2023-06-25 NOTE — PROGRESS NOTES
Pharmacokinetic Assessment Follow Up: IV Vancomycin    Vancomycin serum concentration assessment(s):    The trough level was drawn correctly and can be used to guide therapy at this time. The measurement is above the desired definitive target range of 10 to 15 mcg/mL.    Vancomycin Regimen Plan:    Change regimen to Vancomycin 1000 mg IV every 12 hours with next serum trough concentration measured at 0430 prior to fourth dose on 6/25/23    Drug levels (last 3 results):  Recent Labs   Lab Result Units 06/23/23  1518 06/24/23  1618 06/25/23  1541   Vancomycin-Trough ug/mL 7.4* 14.5 16.1       Pharmacy will continue to follow and monitor vancomycin.    Please contact pharmacy at extension [phone] for questions regarding this assessment.    Thank you for the consult,   Shaista Purcell       Patient brief summary:  Nelly Tian is a 72 y.o. female initiated on antimicrobial therapy with IV Vancomycin for treatment of skin & soft tissue infection      Drug Allergies:   Review of patient's allergies indicates:   Allergen Reactions    Dilaudid [hydromorphone] Anaphylaxis     Other reaction(s): Anaphylaxis  Other reaction(s): Unknown    Zyvox [linezolid in dextrose 5%] Shortness Of Breath       Actual Body Weight:   108.9 kg    Renal Function:   Estimated Creatinine Clearance: 95.3 mL/min (based on SCr of 0.7 mg/dL).,     Dialysis Method (if applicable):  N/A    CBC (last 72 hours):  Recent Labs   Lab Result Units 06/23/23  0331 06/24/23  0439 06/25/23  0523   WBC K/uL 9.90 7.68 8.70   Hemoglobin g/dL 13.5 12.6 12.7   Hematocrit % 43.4 40.2 40.5   Platelets K/uL 201 191 189   Gran % % 82.0* 49.8 55.4   Lymph % % 9.5* 18.2 16.8*   Mono % % 6.9 22.4* 15.7*   Eosinophil % % 0.6 8.5* 10.9*   Basophil % % 0.6 0.7 0.9   Differential Method  Automated Automated Automated       Metabolic Panel (last 72 hours):  Recent Labs   Lab Result Units 06/23/23  0331 06/23/23  0935 06/24/23  0439 06/25/23  0523   Sodium mmol/L 137  --  138  140   Potassium mmol/L 3.3* 3.9 3.9 3.8   Chloride mmol/L 104  --  103 101   CO2 mmol/L 23  --  27 29   Glucose mg/dL 137*  --  101 100   BUN mg/dL 14  --  16 18   Creatinine mg/dL 0.7  --  0.6 0.7   Albumin g/dL 3.3*  --  3.1* 3.2*   Total Bilirubin mg/dL 0.7  --  0.4 0.5   Alkaline Phosphatase U/L 71  --  70 67   AST U/L 25  --  30 29   ALT U/L 20  --  22 26   Magnesium mg/dL 2.1  --  2.2 2.1   Phosphorus mg/dL 3.0  --  3.5 3.8       Vancomycin Administrations:  vancomycin given in the last 96 hours                     vancomycin 1,250 mg in dextrose 5 % (D5W) 250 mL IVPB (Vial-Mate) (mg) 1,250 mg New Bag 06/25/23 0525     1,250 mg New Bag 06/24/23 1748    vancomycin 1,500 mg in dextrose 5 % (D5W) 250 mL IVPB (Vial-Mate) (mg) 1,500 mg New Bag 06/24/23 0518     1,500 mg New Bag 06/23/23 1644    vancomycin 1,250 mg in dextrose 5 % (D5W) 250 mL IVPB (Vial-Mate) (mg) 1,250 mg New Bag 06/23/23 0530    vancomycin (VANCOCIN) 2,000 mg in dextrose 5 % (D5W) 500 mL IVPB (mg) 2,000 mg New Bag 06/22/23 1641                    Microbiologic Results:  Microbiology Results (last 7 days)       Procedure Component Value Units Date/Time    Blood culture x two cultures. Draw prior to antibiotics. [[id number]] Collected: 06/22/23 1436    Order Status: Completed Specimen: Blood from Peripheral, Left Updated: 06/25/23 0612     Blood Culture, Routine No Growth to date      No Growth to date      No Growth to date    Narrative:      Aerobic and anaerobic    Blood culture x two cultures. Draw prior to antibiotics. [[id number]] Collected: 06/22/23 1454    Order Status: Completed Specimen: Blood Updated: 06/25/23 0612     Blood Culture, Routine No Growth to date      No Growth to date      No Growth to date    Narrative:      Aerobic and anaerobic

## 2023-06-25 NOTE — PROGRESS NOTES
[facility] Hospital Medicine  Progress Note    Patient Name: Nelly Tian  MRN: [id number]  Patient Class: IP- Inpatient   Admission Date: 6/22/2023  Length of Stay: 1 days  Attending Physician: Brennan Wagner MD  Primary Care Provider: Constantine Bird MD        Subjective:     Principal Problem:Abdominal wall cellulitis        HPI:  Patient is a 72-year-old female with history of AFib on Eliquis, combined heart failure, COPD, type 2 diabetes, COPD, numerous episodes of abdominal cellulitis presents to the ED with complaint recurrent abdominal wall cellulitis.  Patient reports she was feeling well yesterday but today began to have fever and chills and pain over the left side of her abdominal wall.  She notes some skin breakdown in the area that she thinks the infection entered through.  She denies any injury to the area.  She reports she is not been on suppressive antibiotic for many months.  In the past had been on suppressive cefadroxil per Dr. Juan.  In the ED she is febrile with an elevated white count and lactic acid.  She will be admitted for sepsis secondary to wall cellulitis.  Infectious Disease will be consulted.  She will be given IV vancomycin.      Overview/Hospital Course:  72-year-old female with AFib on Eliquis, combined heart failure, COPD, type 2 diabetes, COPD, numerous episodes of abdominal cellulitis admitted to the ED with recurrent abdominal wall cellulitis. Also with extensive groin intertrigo. She improved on regimen vancomycin, clindamycin, fluconazole per Infectious Disease recommendation.  Wound Care is consulted. Also utilized miconazole powder.       Interval History: Patient seen and examined. NAEON. Continued improvement.      Objective:     Vital Signs (Most Recent):  Temp: 97.3 °F (36.3 °C) (06/25/23 1121)  Pulse: (!) 49 (06/25/23 1121)  Resp: 16 (06/25/23 1121)  BP: 124/60 (06/25/23 1121)  SpO2: 96 % (06/25/23 1121) Vital Signs (24h Range):  Temp:  [97.3  °F (36.3 °C)-98.3 °F (36.8 °C)] 97.3 °F (36.3 °C)  Pulse:  [46-62] 49  Resp:  [16-18] 16  SpO2:  [93 %-98 %] 96 %  BP: (122-146)/(58-64) 124/60     Weight: 108.9 kg (240 lb)  Body mass index is 36.49 kg/m².    Intake/Output Summary (Last 24 hours) at 6/25/2023 1337  Last data filed at 6/25/2023 0734  Gross per 24 hour   Intake 320 ml   Output 420 ml   Net -100 ml         Physical Exam  Constitutional:       General: She is not in acute distress.     Appearance: She is well-developed. She is obese.   HENT:      Head: Normocephalic and atraumatic.   Cardiovascular:      Rate and Rhythm: Normal rate and regular rhythm.   Pulmonary:      Effort: Pulmonary effort is normal. No respiratory distress.   Abdominal:      General: Bowel sounds are normal. There is no distension.      Palpations: Abdomen is soft.      Tenderness: There is no abdominal tenderness.      Comments: Large area of cellulitis over her pannus on the left side, continues improving   Skin:     General: Skin is warm and dry.      Comments: Groin intertrigo   Neurological:      General: No focal deficit present.      Mental Status: She is alert and oriented to person, place, and time. Mental status is at baseline.   Psychiatric:         Behavior: Behavior normal.           Significant Labs: All pertinent labs within the past 24 hours have been reviewed.    Significant Imaging: I have reviewed all pertinent imaging results/findings within the past 24 hours.      Assessment/Plan:      * Abdominal wall cellulitis  Recurrent. Improving   - continue regimen per ID: vancomycin, Fluconazole, Clindamycin  - Infectious disease following  - Wound care    Intertriginous dermatitis associated with moisture  - systemic diflucan plus topical miconazole powder  - wound care    Chronic combined systolic and diastolic CHF (congestive heart failure)  Patient is identified as having Combined Systolic and Diastolic heart failure that is Chronic. CHF is currently controlled.  Latest ECHO performed and demonstrates- Results for orders placed during the hospital encounter of 01/31/23    Echo Saline Bubble? No    Interpretation Summary  · Moderate left atrial enlargement.  · Mild pulmonic regurgitation.  · Mild mitral regurgitation.  · A diastolic pattern consistent with atrial fibrillation observed.  · Limited study; 4 chamber 2chamber long axis, 2 chamber long axis views not seen, suggest repeat study  .Monitor clinical status closely. Monitor on telemetry. Patient is off CHF pathway.  Monitor strict Is&Os and daily weights.  Place on fluid restriction of 1.5 L. Continue to stress to patient importance of self efficacy and  on diet for CHF.   - continue home lasix and aldactone      Hyperlipidemia associated with type 2 diabetes mellitus   Patient is chronically on statin.will continue for now. Monitor clinically. Last LDL was   Lab Results   Component Value Date    LDLCALC 41.6 (L) 03/25/2022       Type 2 diabetes mellitus with diabetic neuropathy, without long-term current use of insulin  Patient's FSGs are controlled on current medication regimen.  Last A1c reviewed-   Lab Results   Component Value Date    HGBA1C 5.4 04/27/2023     Most recent fingerstick glucose reviewed-   Recent Labs   Lab 06/24/23  1619 06/24/23  2101 06/25/23  0800 06/25/23  1150   POCTGLUCOSE 79 75 80 77     Current correctional scale  Medium  Maintain anti-hyperglycemic dose as follows-   Antihyperglycemics (From admission, onward)    Start     Stop Route Frequency Ordered    06/22/23 1828  insulin aspart U-100 pen 1-10 Units         -- SubQ Before meals & nightly PRN 06/22/23 1828        Hold Oral hypoglycemics while patient is in the hospital.    Hypertension associated with diabetes  Chronic, controlled.  Latest blood pressure and vitals reviewed-     Temp:  [97.3 °F (36.3 °C)-98.3 °F (36.8 °C)]   Pulse:  [46-62]   Resp:  [16-18]   BP: (122-146)/(58-64)   SpO2:  [93 %-98 %] .   Home meds for  hypertension were reviewed and noted below.   Hypertension Medications             furosemide (LASIX) 40 MG tablet TAKE 1 TABLET(40 MG) BY MOUTH EVERY DAY    spironolactone (ALDACTONE) 25 MG tablet Take 1 tablet (25 mg total) by mouth once daily.          While in the hospital, will manage blood pressure as follows; Continue home antihypertensive regimen    Will utilize p.r.n. blood pressure medication only if patient's blood pressure greater than 180/110 and she develops symptoms such as worsening chest pain or shortness of breath.      Chronic atrial fibrillation  Chronic, stable  No rate/rhythm control meds noted  - Telemetry monitoring  - Continue anticoagulation with Eliquis    Severe obesity (BMI 35.0-35.9 with comorbidity)  Body mass index is 36.49 kg/m². Morbid obesity complicates all aspects of disease management from diagnostic modalities to treatment.       VTE Risk Mitigation (From admission, onward)         Ordered     apixaban tablet 5 mg  2 times daily         06/22/23 1828     IP VTE HIGH RISK PATIENT  Once         06/22/23 1828     Place sequential compression device  Until discontinued         06/22/23 1828     Reason for No Pharmacological VTE Prophylaxis  Once        Question:  Reasons:  Answer:  Already adequately anticoagulated on oral Anticoagulants    06/22/23 1828                Discharge Planning   KRISTOPHER:  6/26    Code Status: DNR   Is the patient medically ready for discharge?:     Reason for patient still in hospital (select all that apply): Patient trending condition and Treatment  Discharge Plan A: Home                  Brennan Wagner MD  Department of Hospital Medicine   [facility]

## 2023-06-25 NOTE — ASSESSMENT & PLAN NOTE
Recurrent. Improving   - continue regimen per ID: vancomycin, Fluconazole, Clindamycin  - Infectious disease following  - Wound care

## 2023-06-25 NOTE — ASSESSMENT & PLAN NOTE
Chronic, controlled.  Latest blood pressure and vitals reviewed-     Temp:  [97.3 °F (36.3 °C)-98.3 °F (36.8 °C)]   Pulse:  [46-62]   Resp:  [16-18]   BP: (122-146)/(58-64)   SpO2:  [93 %-98 %] .   Home meds for hypertension were reviewed and noted below.   Hypertension Medications             furosemide (LASIX) 40 MG tablet TAKE 1 TABLET(40 MG) BY MOUTH EVERY DAY    spironolactone (ALDACTONE) 25 MG tablet Take 1 tablet (25 mg total) by mouth once daily.          While in the hospital, will manage blood pressure as follows; Continue home antihypertensive regimen    Will utilize p.r.n. blood pressure medication only if patient's blood pressure greater than 180/110 and she develops symptoms such as worsening chest pain or shortness of breath.

## 2023-06-26 VITALS
WEIGHT: 240 LBS | TEMPERATURE: 98 F | OXYGEN SATURATION: 93 % | RESPIRATION RATE: 18 BRPM | SYSTOLIC BLOOD PRESSURE: 134 MMHG | BODY MASS INDEX: 36.37 KG/M2 | HEIGHT: 68 IN | DIASTOLIC BLOOD PRESSURE: 63 MMHG | HEART RATE: 54 BPM

## 2023-06-26 LAB
ALBUMIN SERPL BCP-MCNC: 3.1 G/DL (ref 3.5–5.2)
ALP SERPL-CCNC: 65 U/L (ref 55–135)
ALT SERPL W/O P-5'-P-CCNC: 24 U/L (ref 10–44)
ANION GAP SERPL CALC-SCNC: 9 MMOL/L (ref 8–16)
AST SERPL-CCNC: 27 U/L (ref 10–40)
BASOPHILS # BLD AUTO: 0.07 K/UL (ref 0–0.2)
BASOPHILS NFR BLD: 0.9 % (ref 0–1.9)
BILIRUB SERPL-MCNC: 0.5 MG/DL (ref 0.1–1)
BUN SERPL-MCNC: 15 MG/DL (ref 8–23)
CALCIUM SERPL-MCNC: 9.2 MG/DL (ref 8.7–10.5)
CHLORIDE SERPL-SCNC: 100 MMOL/L (ref 95–110)
CO2 SERPL-SCNC: 29 MMOL/L (ref 23–29)
CREAT SERPL-MCNC: 0.6 MG/DL (ref 0.5–1.4)
CRP SERPL-MCNC: 19.7 MG/L (ref 0–8.2)
DIFFERENTIAL METHOD: ABNORMAL
EOSINOPHIL # BLD AUTO: 0.7 K/UL (ref 0–0.5)
EOSINOPHIL NFR BLD: 9.5 % (ref 0–8)
ERYTHROCYTE [DISTWIDTH] IN BLOOD BY AUTOMATED COUNT: 13.5 % (ref 11.5–14.5)
EST. GFR  (NO RACE VARIABLE): >60 ML/MIN/1.73 M^2
GLUCOSE SERPL-MCNC: 97 MG/DL (ref 70–110)
HCT VFR BLD AUTO: 40.8 % (ref 37–48.5)
HGB BLD-MCNC: 12.8 G/DL (ref 12–16)
IMM GRANULOCYTES # BLD AUTO: 0.03 K/UL (ref 0–0.04)
IMM GRANULOCYTES NFR BLD AUTO: 0.4 % (ref 0–0.5)
LYMPHOCYTES # BLD AUTO: 1.5 K/UL (ref 1–4.8)
LYMPHOCYTES NFR BLD: 19.4 % (ref 18–48)
MAGNESIUM SERPL-MCNC: 2 MG/DL (ref 1.6–2.6)
MCH RBC QN AUTO: 29.4 PG (ref 27–31)
MCHC RBC AUTO-ENTMCNC: 31.4 G/DL (ref 32–36)
MCV RBC AUTO: 94 FL (ref 82–98)
MONOCYTES # BLD AUTO: 1.4 K/UL (ref 0.3–1)
MONOCYTES NFR BLD: 18.5 % (ref 4–15)
NEUTROPHILS # BLD AUTO: 4 K/UL (ref 1.8–7.7)
NEUTROPHILS NFR BLD: 51.3 % (ref 38–73)
NRBC BLD-RTO: 0 /100 WBC
PHOSPHATE SERPL-MCNC: 3.8 MG/DL (ref 2.7–4.5)
PLATELET # BLD AUTO: 214 K/UL (ref 150–450)
PMV BLD AUTO: 10.4 FL (ref 9.2–12.9)
POTASSIUM SERPL-SCNC: 4.7 MMOL/L (ref 3.5–5.1)
PROT SERPL-MCNC: 6.6 G/DL (ref 6–8.4)
RBC # BLD AUTO: 4.35 M/UL (ref 4–5.4)
SODIUM SERPL-SCNC: 138 MMOL/L (ref 136–145)
WBC # BLD AUTO: 7.72 K/UL (ref 3.9–12.7)

## 2023-06-26 PROCEDURE — 99231 PR SUBSEQUENT HOSPITAL CARE,LEVL I: ICD-10-PCS | Mod: S$GLB,,, | Performed by: INTERNAL MEDICINE

## 2023-06-26 PROCEDURE — 25000003 PHARM REV CODE 250: Performed by: STUDENT IN AN ORGANIZED HEALTH CARE EDUCATION/TRAINING PROGRAM

## 2023-06-26 PROCEDURE — 99231 SBSQ HOSP IP/OBS SF/LOW 25: CPT | Mod: S$GLB,,, | Performed by: INTERNAL MEDICINE

## 2023-06-26 PROCEDURE — 25000003 PHARM REV CODE 250: Performed by: NURSE PRACTITIONER

## 2023-06-26 PROCEDURE — 25000003 PHARM REV CODE 250: Performed by: INTERNAL MEDICINE

## 2023-06-26 PROCEDURE — 86140 C-REACTIVE PROTEIN: CPT | Performed by: INTERNAL MEDICINE

## 2023-06-26 PROCEDURE — 63600175 PHARM REV CODE 636 W HCPCS: Performed by: STUDENT IN AN ORGANIZED HEALTH CARE EDUCATION/TRAINING PROGRAM

## 2023-06-26 PROCEDURE — 80053 COMPREHEN METABOLIC PANEL: CPT | Performed by: HOSPITALIST

## 2023-06-26 PROCEDURE — 83735 ASSAY OF MAGNESIUM: CPT | Performed by: HOSPITALIST

## 2023-06-26 PROCEDURE — 63700000 PHARM REV CODE 250 ALT 637 W/O HCPCS: Performed by: INTERNAL MEDICINE

## 2023-06-26 PROCEDURE — 84100 ASSAY OF PHOSPHORUS: CPT | Performed by: HOSPITALIST

## 2023-06-26 PROCEDURE — 25000003 PHARM REV CODE 250: Performed by: HOSPITALIST

## 2023-06-26 PROCEDURE — 85025 COMPLETE CBC W/AUTO DIFF WBC: CPT | Performed by: HOSPITALIST

## 2023-06-26 PROCEDURE — 36415 COLL VENOUS BLD VENIPUNCTURE: CPT | Performed by: HOSPITALIST

## 2023-06-26 RX ORDER — CLINDAMYCIN HYDROCHLORIDE 300 MG/1
300 CAPSULE ORAL EVERY 6 HOURS
Qty: 40 CAPSULE | Refills: 0 | Status: SHIPPED | OUTPATIENT
Start: 2023-06-26 | End: 2023-07-06

## 2023-06-26 RX ORDER — MICONAZOLE NITRATE 2 %
POWDER (GRAM) TOPICAL DAILY
Qty: 85 G | Refills: 0 | Status: SHIPPED | OUTPATIENT
Start: 2023-06-26 | End: 2023-10-26

## 2023-06-26 RX ADMIN — LACTOBACILLUS TAB 4 TABLET: TAB at 08:06

## 2023-06-26 RX ADMIN — FLUCONAZOLE 200 MG: 100 TABLET ORAL at 08:06

## 2023-06-26 RX ADMIN — CLINDAMYCIN HYDROCHLORIDE 300 MG: 150 CAPSULE ORAL at 12:06

## 2023-06-26 RX ADMIN — MICONAZOLE NITRATE: 20 POWDER TOPICAL at 08:06

## 2023-06-26 RX ADMIN — ACETAMINOPHEN 650 MG: 325 TABLET ORAL at 08:06

## 2023-06-26 RX ADMIN — VANCOMYCIN HYDROCHLORIDE 1000 MG: 1 INJECTION, POWDER, LYOPHILIZED, FOR SOLUTION INTRAVENOUS at 05:06

## 2023-06-26 RX ADMIN — CLINDAMYCIN HYDROCHLORIDE 300 MG: 150 CAPSULE ORAL at 06:06

## 2023-06-26 RX ADMIN — APIXABAN 5 MG: 2.5 TABLET, FILM COATED ORAL at 08:06

## 2023-06-26 RX ADMIN — ALUMINUM HYDROXIDE, MAGNESIUM HYDROXIDE, AND SIMETHICONE 30 ML: 200; 200; 20 SUSPENSION ORAL at 08:06

## 2023-06-26 RX ADMIN — ALUMINUM HYDROXIDE, MAGNESIUM HYDROXIDE, AND SIMETHICONE 30 ML: 200; 200; 20 SUSPENSION ORAL at 01:06

## 2023-06-26 NOTE — DISCHARGE SUMMARY
[facility] Hospital Medicine  Discharge Summary      Patient Name: Nelly Tian  MRN: [id number]  KELVIN: [id number]  Patient Class: IP- Inpatient  Admission Date: 6/22/2023  Hospital Length of Stay: 2 days  Discharge Date and Time:  06/26/2023 10:05 AM  Attending Physician: Brennan Wagner MD   Discharging Provider: Brennan Wagner MD  Primary Care Provider: Constantine Bird MD    Primary Care Team: Networked reference to record PCT     HPI:   Patient is a 72-year-old female with history of AFib on Eliquis, combined heart failure, COPD, type 2 diabetes, COPD, numerous episodes of abdominal cellulitis presents to the ED with complaint recurrent abdominal wall cellulitis.  Patient reports she was feeling well yesterday but today began to have fever and chills and pain over the left side of her abdominal wall.  She notes some skin breakdown in the area that she thinks the infection entered through.  She denies any injury to the area.  She reports she is not been on suppressive antibiotic for many months.  In the past had been on suppressive cefadroxil per Dr. Juan.  In the ED she is febrile with an elevated white count and lactic acid.  She will be admitted for sepsis secondary to wall cellulitis.  Infectious Disease will be consulted.  She will be given IV vancomycin.      * No surgery found *      Hospital Course:   72-year-old female with AFib on Eliquis, combined heart failure, COPD, type 2 diabetes, COPD, numerous episodes of abdominal cellulitis admitted to the ED with recurrent abdominal wall cellulitis. Also with extensive groin intertrigo. She improved on regimen of vancomycin, clindamycin, fluconazole per Infectious Disease recommendation.  Wound Care was consulted. Also utilized miconazole powder. She had nearly complete resolution of her cellulitis by time of discharge. Prescribed oral clindamycin to continue as well as the topical miconazole. Encouraged to take probiotics as well. To  f/u with PCP and infectious disease clinic. By time of discharge the patient was tolerating a regular diet with resolving admission symptoms. Patient seen and examined on day of discharge.    Physical exam on day of discharge:  Constitutional:       General: She is not in acute distress.     Appearance: She is well-developed. She is obese.   HENT:      Head: Normocephalic and atraumatic.   Cardiovascular:      Rate and Rhythm: Normal rate and regular rhythm.   Pulmonary:      Effort: Pulmonary effort is normal. No respiratory distress.   Abdominal:      General: There is no distension.      Palpations: Abdomen is soft.      Tenderness: There is no abdominal tenderness.      Comments: minimal area of cellulitis over her pannus on the left side, continues improving   Skin:     General: Skin is warm and dry.      Comments: Groin intertrigo   Neurological:      General: No focal deficit present.      Mental Status: She is alert and oriented to person, place, and time. Mental status is at baseline.   Psychiatric:         Behavior: Behavior normal.          Goals of Care Treatment Preferences:  Code Status: DNR      Consults:   Consults (From admission, onward)        Status Ordering Provider     Inpatient consult to Midline team  Once        Provider:  (Not yet assigned)    JOVANNY Franklin     Pharmacy to dose Vancomycin consult  Once        Provider:  (Not yet assigned)   See [facility] for full Linked Orders Report.    Acknowledged JOVANNY SMITH     Inpatient consult to Infectious Diseases  Once        Provider:  MD Zuleyka Mitchell SARAH M.          No new Assessment & Plan notes have been filed under this hospital service since the last note was generated.  Service: Hospital Medicine    Final Active Diagnoses:    Diagnosis Date Noted POA    PRINCIPAL PROBLEM:  Abdominal wall cellulitis [L03.311] 05/04/2019 Yes    Intertriginous dermatitis associated with moisture [L30.4] 06/24/2023 Yes  "   Chronic combined systolic and diastolic CHF (congestive heart failure) [I50.42] 08/23/2018 Yes    Hyperlipidemia associated with type 2 diabetes mellitus [E11.69, E78.5] 11/02/2016 Yes    Type 2 diabetes mellitus with diabetic neuropathy, without long-term current use of insulin [E11.40] 11/02/2016 Yes    Hypertension associated with diabetes [E11.59, I15.2] 01/19/2016 Yes    Chronic atrial fibrillation [I48.20] 11/10/2015 Yes    Severe obesity (BMI 35.0-35.9 with comorbidity) [E66.01, Z68.35] 08/08/2012 Not Applicable      Problems Resolved During this Admission:       Discharged Condition: good    Disposition: Home or Self Care    Follow Up:   Follow-up Information     Constantine Bird MD. Schedule an appointment as soon as possible for a visit on 7/7/2023.    Specialty: Family Medicine  Why: Dr. Bird follow up at 9:40 am  Contact information:  [address]  [place] [address]  [phone]             Melina Juan MD. Schedule an appointment as soon as possible for a visit on 7/5/2023.    Specialty: Infectious Diseases  Why: 9:00 am Hospital follow up  Contact information:  [address]  [address]  [place] [address]  [phone]                       Patient Instructions:      WALKER FOR HOME USE     Order Specific Question Answer Comments   Type of Walker: Heavy duty (300+ lbs)    With wheels? Yes    Height: 5' 8" (1.727 m)    Weight: 108.9 kg (240 lb)    Length of need (1-99 months): 99    Does patient have medical equipment at home? oxygen    Does patient have medical equipment at home? bedside commode    Does patient have medical equipment at home? cane, straight    Please check all that apply: Patient's condition impairs ambulation.      WALKER FOR HOME USE     Order Specific Question Answer Comments   Type of Walker: Heavy duty (300+ lbs)    With wheels? Yes    Height: 5' 8" (1.727 m)    Weight: 108.9 kg (240 lb)    Length of need (1-99 months): 12    Does patient have medical equipment at home? " oxygen    Does patient have medical equipment at home? bedside commode    Does patient have medical equipment at home? cane, straight    Please check all that apply: Patient's condition impairs ambulation.    Please check all that apply: Patient is unable to safely ambulate without equipment.      Diet diabetic     Notify your health care provider if you experience any of the following:  temperature >100.4     Notify your health care provider if you experience any of the following:  persistent nausea and vomiting or diarrhea     Notify your health care provider if you experience any of the following:  severe uncontrolled pain     Notify your health care provider if you experience any of the following:  redness, tenderness, or signs of infection (pain, swelling, redness, odor or green/yellow discharge around incision site)     Notify your health care provider if you experience any of the following:  difficulty breathing or increased cough     Notify your health care provider if you experience any of the following:  severe persistent headache     Notify your health care provider if you experience any of the following:  worsening rash     Notify your health care provider if you experience any of the following:  persistent dizziness, light-headedness, or visual disturbances     Notify your health care provider if you experience any of the following:  increased confusion or weakness     Activity as tolerated       Significant Diagnostic Studies:       Recent Results (from the past 140 hour(s))   Urinalysis, Reflex to Urine Culture Urine, Clean Catch    Collection Time: 06/22/23  2:21 PM    Specimen: Urine   Result Value Ref Range    Specimen UA Urine, Clean Catch     Color, UA Yellow Yellow, Straw, Annette    Appearance, UA Clear Clear    pH, UA 6.0 5.0 - 8.0    Specific Gravity, UA 1.005 1.005 - 1.030    Protein, UA Negative Negative    Glucose, UA 3+ (A) Negative    Ketones, UA Negative Negative    Bilirubin (UA)  Negative Negative    Occult Blood UA Negative Negative    Nitrite, UA Negative Negative    Urobilinogen, UA Negative <2.0 EU/dL    Leukocytes, UA Negative Negative   Urinalysis Microscopic    Collection Time: 06/22/23  2:21 PM   Result Value Ref Range    RBC, UA 1 0 - 4 /hpf    Bacteria Rare None-Occ /hpf    Yeast, UA None None    Microscopic Comment SEE COMMENT    HIV 1/2 Ag/Ab (4th Gen)    Collection Time: 06/22/23  2:36 PM   Result Value Ref Range    HIV 1/2 Ag/Ab Non-reactive Non-reactive   Hepatitis C Antibody    Collection Time: 06/22/23  2:36 PM   Result Value Ref Range    Hepatitis C Ab Non-reactive Non-reactive   Blood culture x two cultures. Draw prior to antibiotics.    Collection Time: 06/22/23  2:36 PM    Specimen: Peripheral, Left; Blood   Result Value Ref Range    Blood Culture, Routine No Growth to date     Blood Culture, Routine No Growth to date     Blood Culture, Routine No Growth to date     Blood Culture, Routine No Growth to date    CBC auto differential    Collection Time: 06/22/23  2:36 PM   Result Value Ref Range    WBC 13.89 (H) 3.90 - 12.70 K/uL    RBC 5.05 4.00 - 5.40 M/uL    Hemoglobin 14.7 12.0 - 16.0 g/dL    Hematocrit 47.6 37.0 - 48.5 %    MCV 94 82 - 98 fL    MCH 29.1 27.0 - 31.0 pg    MCHC 30.9 (L) 32.0 - 36.0 g/dL    RDW 13.9 11.5 - 14.5 %    Platelets 192 150 - 450 K/uL    MPV 10.2 9.2 - 12.9 fL    Immature Granulocytes 0.4 0.0 - 0.5 %    Gran # (ANC) 11.8 (H) 1.8 - 7.7 K/uL    Immature Grans (Abs) 0.06 (H) 0.00 - 0.04 K/uL    Lymph # 0.7 (L) 1.0 - 4.8 K/uL    Mono # 1.1 (H) 0.3 - 1.0 K/uL    Eos # 0.2 0.0 - 0.5 K/uL    Baso # 0.06 0.00 - 0.20 K/uL    nRBC 0 0 /100 WBC    Gran % 84.9 (H) 38.0 - 73.0 %    Lymph % 5.0 (L) 18.0 - 48.0 %    Mono % 8.1 4.0 - 15.0 %    Eosinophil % 1.2 0.0 - 8.0 %    Basophil % 0.4 0.0 - 1.9 %    Differential Method Automated    Comprehensive metabolic panel    Collection Time: 06/22/23  2:36 PM   Result Value Ref Range    Sodium 139 136 - 145 mmol/L     Potassium 4.7 3.5 - 5.1 mmol/L    Chloride 102 95 - 110 mmol/L    CO2 25 23 - 29 mmol/L    Glucose 91 70 - 110 mg/dL    BUN 14 8 - 23 mg/dL    Creatinine 0.7 0.5 - 1.4 mg/dL    Calcium 10.3 8.7 - 10.5 mg/dL    Total Protein 8.0 6.0 - 8.4 g/dL    Albumin 4.0 3.5 - 5.2 g/dL    Total Bilirubin 0.9 0.1 - 1.0 mg/dL    Alkaline Phosphatase 92 55 - 135 U/L    AST 27 10 - 40 U/L    ALT 22 10 - 44 U/L    eGFR >60 >60 mL/min/1.73 m^2    Anion Gap 12 8 - 16 mmol/L   Lactic acid, plasma #1    Collection Time: 06/22/23  2:36 PM   Result Value Ref Range    Lactate (Lactic Acid) 2.5 (H) 0.5 - 2.2 mmol/L   Blood culture x two cultures. Draw prior to antibiotics.    Collection Time: 06/22/23  2:54 PM    Specimen: Blood   Result Value Ref Range    Blood Culture, Routine No Growth to date     Blood Culture, Routine No Growth to date     Blood Culture, Routine No Growth to date     Blood Culture, Routine No Growth to date    Lactic acid, plasma #2    Collection Time: 06/22/23  5:48 PM   Result Value Ref Range    Lactate (Lactic Acid) 2.1 0.5 - 2.2 mmol/L   POCT glucose    Collection Time: 06/22/23 10:16 PM   Result Value Ref Range    POCT Glucose 98 70 - 110 mg/dL   CBC auto differential (daily)    Collection Time: 06/23/23  3:31 AM   Result Value Ref Range    WBC 9.90 3.90 - 12.70 K/uL    RBC 4.67 4.00 - 5.40 M/uL    Hemoglobin 13.5 12.0 - 16.0 g/dL    Hematocrit 43.4 37.0 - 48.5 %    MCV 93 82 - 98 fL    MCH 28.9 27.0 - 31.0 pg    MCHC 31.1 (L) 32.0 - 36.0 g/dL    RDW 14.2 11.5 - 14.5 %    Platelets 201 150 - 450 K/uL    MPV 10.5 9.2 - 12.9 fL    Immature Granulocytes 0.4 0.0 - 0.5 %    Gran # (ANC) 8.1 (H) 1.8 - 7.7 K/uL    Immature Grans (Abs) 0.04 0.00 - 0.04 K/uL    Lymph # 0.9 (L) 1.0 - 4.8 K/uL    Mono # 0.7 0.3 - 1.0 K/uL    Eos # 0.1 0.0 - 0.5 K/uL    Baso # 0.06 0.00 - 0.20 K/uL    nRBC 0 0 /100 WBC    Gran % 82.0 (H) 38.0 - 73.0 %    Lymph % 9.5 (L) 18.0 - 48.0 %    Mono % 6.9 4.0 - 15.0 %    Eosinophil % 0.6 0.0 - 8.0 %     Basophil % 0.6 0.0 - 1.9 %    Differential Method Automated    Comprehensive metabolic panel (daily)    Collection Time: 06/23/23  3:31 AM   Result Value Ref Range    Sodium 137 136 - 145 mmol/L    Potassium 3.3 (L) 3.5 - 5.1 mmol/L    Chloride 104 95 - 110 mmol/L    CO2 23 23 - 29 mmol/L    Glucose 137 (H) 70 - 110 mg/dL    BUN 14 8 - 23 mg/dL    Creatinine 0.7 0.5 - 1.4 mg/dL    Calcium 9.4 8.7 - 10.5 mg/dL    Total Protein 6.8 6.0 - 8.4 g/dL    Albumin 3.3 (L) 3.5 - 5.2 g/dL    Total Bilirubin 0.7 0.1 - 1.0 mg/dL    Alkaline Phosphatase 71 55 - 135 U/L    AST 25 10 - 40 U/L    ALT 20 10 - 44 U/L    eGFR >60 >60 mL/min/1.73 m^2    Anion Gap 10 8 - 16 mmol/L   Magnesium - Daily    Collection Time: 06/23/23  3:31 AM   Result Value Ref Range    Magnesium 2.1 1.6 - 2.6 mg/dL   Phosphorus -Daily    Collection Time: 06/23/23  3:31 AM   Result Value Ref Range    Phosphorus 3.0 2.7 - 4.5 mg/dL   POCT glucose    Collection Time: 06/23/23  7:35 AM   Result Value Ref Range    POCT Glucose 90 70 - 110 mg/dL   Potassium    Collection Time: 06/23/23  9:35 AM   Result Value Ref Range    Potassium 3.9 3.5 - 5.1 mmol/L   POCT glucose    Collection Time: 06/23/23 11:44 AM   Result Value Ref Range    POCT Glucose 86 70 - 110 mg/dL   VANCOMYCIN, TROUGH    Collection Time: 06/23/23  3:18 PM   Result Value Ref Range    Vancomycin-Trough 7.4 (L) 10.0 - 22.0 ug/mL   POCT glucose    Collection Time: 06/23/23  4:38 PM   Result Value Ref Range    POCT Glucose 120 (H) 70 - 110 mg/dL   POCT glucose    Collection Time: 06/23/23  9:21 PM   Result Value Ref Range    POCT Glucose 101 70 - 110 mg/dL   CBC auto differential (daily)    Collection Time: 06/24/23  4:39 AM   Result Value Ref Range    WBC 7.68 3.90 - 12.70 K/uL    RBC 4.22 4.00 - 5.40 M/uL    Hemoglobin 12.6 12.0 - 16.0 g/dL    Hematocrit 40.2 37.0 - 48.5 %    MCV 95 82 - 98 fL    MCH 29.9 27.0 - 31.0 pg    MCHC 31.3 (L) 32.0 - 36.0 g/dL    RDW 14.1 11.5 - 14.5 %    Platelets 191 150 -  450 K/uL    MPV 10.6 9.2 - 12.9 fL    Immature Granulocytes 0.4 0.0 - 0.5 %    Gran # (ANC) 3.8 1.8 - 7.7 K/uL    Immature Grans (Abs) 0.03 0.00 - 0.04 K/uL    Lymph # 1.4 1.0 - 4.8 K/uL    Mono # 1.7 (H) 0.3 - 1.0 K/uL    Eos # 0.7 (H) 0.0 - 0.5 K/uL    Baso # 0.05 0.00 - 0.20 K/uL    nRBC 0 0 /100 WBC    Gran % 49.8 38.0 - 73.0 %    Lymph % 18.2 18.0 - 48.0 %    Mono % 22.4 (H) 4.0 - 15.0 %    Eosinophil % 8.5 (H) 0.0 - 8.0 %    Basophil % 0.7 0.0 - 1.9 %    Differential Method Automated    Comprehensive metabolic panel (daily)    Collection Time: 06/24/23  4:39 AM   Result Value Ref Range    Sodium 138 136 - 145 mmol/L    Potassium 3.9 3.5 - 5.1 mmol/L    Chloride 103 95 - 110 mmol/L    CO2 27 23 - 29 mmol/L    Glucose 101 70 - 110 mg/dL    BUN 16 8 - 23 mg/dL    Creatinine 0.6 0.5 - 1.4 mg/dL    Calcium 9.3 8.7 - 10.5 mg/dL    Total Protein 6.5 6.0 - 8.4 g/dL    Albumin 3.1 (L) 3.5 - 5.2 g/dL    Total Bilirubin 0.4 0.1 - 1.0 mg/dL    Alkaline Phosphatase 70 55 - 135 U/L    AST 30 10 - 40 U/L    ALT 22 10 - 44 U/L    eGFR >60 >60 mL/min/1.73 m^2    Anion Gap 8 8 - 16 mmol/L   Magnesium - Daily    Collection Time: 06/24/23  4:39 AM   Result Value Ref Range    Magnesium 2.2 1.6 - 2.6 mg/dL   Phosphorus -Daily    Collection Time: 06/24/23  4:39 AM   Result Value Ref Range    Phosphorus 3.5 2.7 - 4.5 mg/dL   C-Reactive Protein    Collection Time: 06/24/23  4:39 AM   Result Value Ref Range    CRP 76.2 (H) 0.0 - 8.2 mg/L   POCT glucose    Collection Time: 06/24/23  7:40 AM   Result Value Ref Range    POCT Glucose 93 70 - 110 mg/dL   POCT glucose    Collection Time: 06/24/23 11:44 AM   Result Value Ref Range    POCT Glucose 95 70 - 110 mg/dL   VANCOMYCIN, TROUGH    Collection Time: 06/24/23  4:18 PM   Result Value Ref Range    Vancomycin-Trough 14.5 10.0 - 22.0 ug/mL   POCT glucose    Collection Time: 06/24/23  4:19 PM   Result Value Ref Range    POCT Glucose 79 70 - 110 mg/dL   POCT glucose    Collection Time: 06/24/23   9:01 PM   Result Value Ref Range    POCT Glucose 75 70 - 110 mg/dL   CBC auto differential (daily)    Collection Time: 06/25/23  5:23 AM   Result Value Ref Range    WBC 8.70 3.90 - 12.70 K/uL    RBC 4.32 4.00 - 5.40 M/uL    Hemoglobin 12.7 12.0 - 16.0 g/dL    Hematocrit 40.5 37.0 - 48.5 %    MCV 94 82 - 98 fL    MCH 29.4 27.0 - 31.0 pg    MCHC 31.4 (L) 32.0 - 36.0 g/dL    RDW 13.9 11.5 - 14.5 %    Platelets 189 150 - 450 K/uL    MPV 10.3 9.2 - 12.9 fL    Immature Granulocytes 0.3 0.0 - 0.5 %    Gran # (ANC) 4.8 1.8 - 7.7 K/uL    Immature Grans (Abs) 0.03 0.00 - 0.04 K/uL    Lymph # 1.5 1.0 - 4.8 K/uL    Mono # 1.4 (H) 0.3 - 1.0 K/uL    Eos # 1.0 (H) 0.0 - 0.5 K/uL    Baso # 0.08 0.00 - 0.20 K/uL    nRBC 0 0 /100 WBC    Gran % 55.4 38.0 - 73.0 %    Lymph % 16.8 (L) 18.0 - 48.0 %    Mono % 15.7 (H) 4.0 - 15.0 %    Eosinophil % 10.9 (H) 0.0 - 8.0 %    Basophil % 0.9 0.0 - 1.9 %    Differential Method Automated    Comprehensive metabolic panel (daily)    Collection Time: 06/25/23  5:23 AM   Result Value Ref Range    Sodium 140 136 - 145 mmol/L    Potassium 3.8 3.5 - 5.1 mmol/L    Chloride 101 95 - 110 mmol/L    CO2 29 23 - 29 mmol/L    Glucose 100 70 - 110 mg/dL    BUN 18 8 - 23 mg/dL    Creatinine 0.7 0.5 - 1.4 mg/dL    Calcium 9.3 8.7 - 10.5 mg/dL    Total Protein 6.8 6.0 - 8.4 g/dL    Albumin 3.2 (L) 3.5 - 5.2 g/dL    Total Bilirubin 0.5 0.1 - 1.0 mg/dL    Alkaline Phosphatase 67 55 - 135 U/L    AST 29 10 - 40 U/L    ALT 26 10 - 44 U/L    eGFR >60 >60 mL/min/1.73 m^2    Anion Gap 10 8 - 16 mmol/L   Magnesium - Daily    Collection Time: 06/25/23  5:23 AM   Result Value Ref Range    Magnesium 2.1 1.6 - 2.6 mg/dL   Phosphorus -Daily    Collection Time: 06/25/23  5:23 AM   Result Value Ref Range    Phosphorus 3.8 2.7 - 4.5 mg/dL   POCT glucose    Collection Time: 06/25/23  8:00 AM   Result Value Ref Range    POCT Glucose 80 70 - 110 mg/dL   POCT glucose    Collection Time: 06/25/23 11:50 AM   Result Value Ref Range     POCT Glucose 77 70 - 110 mg/dL   VANCOMYCIN, TROUGH    Collection Time: 06/25/23  3:41 PM   Result Value Ref Range    Vancomycin-Trough 16.1 10.0 - 22.0 ug/mL   C-Reactive Protein    Collection Time: 06/26/23  4:46 AM   Result Value Ref Range    CRP 19.7 (H) 0.0 - 8.2 mg/L   CBC auto differential (daily)    Collection Time: 06/26/23  4:47 AM   Result Value Ref Range    WBC 7.72 3.90 - 12.70 K/uL    RBC 4.35 4.00 - 5.40 M/uL    Hemoglobin 12.8 12.0 - 16.0 g/dL    Hematocrit 40.8 37.0 - 48.5 %    MCV 94 82 - 98 fL    MCH 29.4 27.0 - 31.0 pg    MCHC 31.4 (L) 32.0 - 36.0 g/dL    RDW 13.5 11.5 - 14.5 %    Platelets 214 150 - 450 K/uL    MPV 10.4 9.2 - 12.9 fL    Immature Granulocytes 0.4 0.0 - 0.5 %    Gran # (ANC) 4.0 1.8 - 7.7 K/uL    Immature Grans (Abs) 0.03 0.00 - 0.04 K/uL    Lymph # 1.5 1.0 - 4.8 K/uL    Mono # 1.4 (H) 0.3 - 1.0 K/uL    Eos # 0.7 (H) 0.0 - 0.5 K/uL    Baso # 0.07 0.00 - 0.20 K/uL    nRBC 0 0 /100 WBC    Gran % 51.3 38.0 - 73.0 %    Lymph % 19.4 18.0 - 48.0 %    Mono % 18.5 (H) 4.0 - 15.0 %    Eosinophil % 9.5 (H) 0.0 - 8.0 %    Basophil % 0.9 0.0 - 1.9 %    Differential Method Automated    Comprehensive metabolic panel (daily)    Collection Time: 06/26/23  4:47 AM   Result Value Ref Range    Sodium 138 136 - 145 mmol/L    Potassium 4.7 3.5 - 5.1 mmol/L    Chloride 100 95 - 110 mmol/L    CO2 29 23 - 29 mmol/L    Glucose 97 70 - 110 mg/dL    BUN 15 8 - 23 mg/dL    Creatinine 0.6 0.5 - 1.4 mg/dL    Calcium 9.2 8.7 - 10.5 mg/dL    Total Protein 6.6 6.0 - 8.4 g/dL    Albumin 3.1 (L) 3.5 - 5.2 g/dL    Total Bilirubin 0.5 0.1 - 1.0 mg/dL    Alkaline Phosphatase 65 55 - 135 U/L    AST 27 10 - 40 U/L    ALT 24 10 - 44 U/L    eGFR >60 >60 mL/min/1.73 m^2    Anion Gap 9 8 - 16 mmol/L   Magnesium - Daily    Collection Time: 06/26/23  4:47 AM   Result Value Ref Range    Magnesium 2.0 1.6 - 2.6 mg/dL   Phosphorus -Daily    Collection Time: 06/26/23  4:47 AM   Result Value Ref Range    Phosphorus 3.8 2.7 - 4.5  mg/dL       Imaging Results          X-Ray Chest AP Portable (Final result)  Result time 06/22/23 17:10:52    Final result by Sruthi Pierre MD (06/22/23 17:10:52)                 Impression:      No acute cardiopulmonary disease      Electronically signed by: Sruthi Pierre MD  Date:    06/22/2023  Time:    17:10             Narrative:    EXAMINATION:  XR CHEST AP PORTABLE    CLINICAL HISTORY:  Sepsis;    TECHNIQUE:  Single frontal view of the chest was performed.    COMPARISON:  01/31/2023    FINDINGS:  The cardiomediastinal silhouette appears stable.  Lungs appear clear of infiltrate and there is no pleural effusion.                                  Pending Diagnostic Studies:     None         Medications:  Reconciled Home Medications:      Medication List      START taking these medications    clindamycin 300 MG capsule  Commonly known as: CLEOCIN  Take 1 capsule (300 mg total) by mouth every 6 (six) hours. for 10 days     Lactobacillus acidophilus 1 billion cell Cap  Take 1 capsule by mouth 3 (three) times daily with meals.     miconazole NITRATE 2 % 2 % top powder  Commonly known as: MICOTIN  Apply topically once daily. Apply to areas of inflamed skin folds for 7 days        CHANGE how you take these medications    albuterol 90 mcg/actuation inhaler  Commonly known as: PROVENTIL/VENTOLIN HFA  INHALE 2 PUFFS EVERY 6 HOURS AS NEEDED FOR WHEEZING  (RESCUE)  What changed: See the new instructions.     atorvastatin 10 MG tablet  Commonly known as: LIPITOR  TAKE 1 TABLET(10 MG) BY MOUTH EVERY OTHER DAY  What changed: See the new instructions.     furosemide 40 MG tablet  Commonly known as: LASIX  TAKE 1 TABLET(40 MG) BY MOUTH EVERY DAY  What changed: when to take this     nystatin powder  Commonly known as: NYSTOP  APPLY TOPICALLY TWICE DAILY  What changed:   · how much to take  · how to take this  · when to take this        CONTINUE taking these medications    apixaban 5 mg Tab  Commonly known as: ELIQUIS  Take  1 tablet (5 mg total) by mouth 2 (two) times daily.     ascorbic acid (vitamin C) 500 MG tablet  Commonly known as: VITAMIN C  Take 2 tablets (1,000 mg total) by mouth every evening.     blood sugar diagnostic Strp  To check BG 2 times daily, to use with insurance preferred meter     blood-glucose meter kit  To check BG 2 times daily, to use with insurance preferred meter     empagliflozin 25 mg tablet  Commonly known as: JARDIANCE  Take 1 tablet (25 mg total) by mouth once daily.     fluticasone propionate 50 mcg/actuation nasal spray  Commonly known as: FLONASE  1 spray (50 mcg total) by Each Nostril route once daily.     gabapentin 800 MG tablet  Commonly known as: NEURONTIN  Take 1 tablet (800 mg total) by mouth 3 (three) times daily.     HYDROcodone-acetaminophen 7.5-325 mg per tablet  Commonly known as: NORCO  Take 1 tablet by mouth every 12 (twelve) hours as needed for Pain. Medical necessary for greater than 7 days for chronic pain.     lancets Misc  To check BG 2 times daily, to use with insurance preferred meter     LIDOcaine 5 %  Commonly known as: LIDODERM  APPLY PATCH ONTO SKIN.REMOVE AND DISCARD PATCH WITHIN 12 HOURS OR AS DIRECTED BY MD     melatonin 10 mg Tab  Take 10 mg by mouth nightly.     metFORMIN 500 MG tablet  Commonly known as: GLUCOPHAGE  Take 1 tablet (500 mg total) by mouth daily with breakfast.     multivitamin tablet  Commonly known as: THERAGRAN  Take 1 tablet by mouth once daily.     ondansetron 8 MG Tbdl  Commonly known as: ZOFRAN-ODT  Take 1 tablet (8 mg total) by mouth every 12 (twelve) hours as needed.     potassium chloride SA 20 MEQ tablet  Commonly known as: K-DUR,KLOR-CON  Take 1 tablet (20 mEq total) by mouth once daily.     spironolactone 25 MG tablet  Commonly known as: ALDACTONE  Take 1 tablet (25 mg total) by mouth once daily.            Indwelling Lines/Drains at time of discharge:   Lines/Drains/Airways     None                 Time spent on the discharge of patient: 37  minutes         Brennan Wagner MD  Department of Hospital Medicine  [facility]

## 2023-06-26 NOTE — PLAN OF CARE
Problem: Adult Inpatient Plan of Care  Goal: Plan of Care Review  Outcome: Ongoing, Progressing  Goal: Patient-Specific Goal (Individualized)  Outcome: Ongoing, Progressing  Goal: Absence of Hospital-Acquired Illness or Injury  Outcome: Ongoing, Progressing  Goal: Optimal Comfort and Wellbeing  Outcome: Ongoing, Progressing  Goal: Readiness for Transition of Care  Outcome: Ongoing, Progressing     Problem: Fall Injury Risk  Goal: Absence of Fall and Fall-Related Injury  Outcome: Ongoing, Progressing     Problem: Infection  Goal: Absence of Infection Signs and Symptoms  Outcome: Ongoing, Progressing   Plan of care reviewed with patient,verbalized understanding.  IV antibiotics administered as per orders. O2-2L per nc in use, pulse ox 93-97%. Medicated for pain once this shift, full relief obtained.  free from falls, injury. Instructed to call for assistance as needed ,verbalized understanding. Bed in low & locked position. Call light in reach, bed alarm on .

## 2023-06-26 NOTE — PLAN OF CARE
PCP f/U with Dr. Duran added to AVS for 7/7/23 at 9:40 am.    7/5/23 Dr. Banuelos [facility] follow up at 9:00 am.     06/26/23 0814   Discharge Assessment   Assessment Type Discharge Planning Reassessment

## 2023-06-26 NOTE — DISCHARGE INSTRUCTIONS
Recommended to take probiotics as well at least for next 2 weeks. The probiotic lactobacillus supplement prescribed may not be covered but there are many good over-the-counter options your pharmacist can help you explore.

## 2023-06-26 NOTE — PLAN OF CARE
The pt is cleared for discharge home from case management and has follow up appointments added to her AVS.    06/26/23 1005   Final Note   Assessment Type Final Discharge Note   Anticipated Discharge Disposition Home   What phone number can be called within the next 1-3 days to see how you are doing after discharge? [phone]   Hospital Resources/Appts/Education Provided Appointments scheduled and added to AVS;Appointments scheduled by Navigator/Coordinator;Community resources provided   Post-Acute Status   Post-Acute Authorization E   E Status Set-up Complete/Auth obtained   Discharge Delays None known at this time

## 2023-06-26 NOTE — PROGRESS NOTES
Consult Note  Infectious Disease    Reason for Consult:      HPI: Nelly Tian is a 72 y.o. female  well known to me with prior diagnosis of recurrent anterior abdominal wall cellulitis, fungal groin infection, AFib, a flutter, CHF, COPD, DM with good hemoglobin A1c, anxiety, depression, came in complaining of left abdominal wall redness, pain, warmth, progressively worse, same as with prior cellulitis.      She feels that IV had infiltrated and she may have not received all of the vancomycin dose prescribed..  I had seen patient last year and she received at least 3 months of chronic suppression therapy with cefadroxil.  We discussed that this time around we may need to go for a longer duration.    She is up-to-date with tetanus vaccine.    She is uncomfortable in the hospital and would like to go home as soon as she can.  I do not think she is able for discharge today or probably not ready until Monday.     06/26/2023.  Left abdominal wall cellulitis is markedly improved.  So is the yeast infection in the groin.  No fever no chills.  No new complaints.  Patient is ready to go home.    Antibiotics (From admission, onward)      Start     Stop Route Frequency Ordered    06/25/23 1730  vancomycin (VANCOCIN) 1,000 mg in dextrose 5 % (D5W) 250 mL IVPB (Vial-Mate)         -- IV Every 12 hours (non-standard times) 06/25/23 1630    06/23/23 1200  clindamycin capsule 300 mg         -- Oral Every 6 hours 06/23/23 1016    06/22/23 1828  vancomycin - pharmacy to dose  ( Skin & Soft Tissue Infection: Purulent, Severe)        See Hyperspace for full Linked Orders Report.    -- IV pharmacy to manage frequency 06/22/23 1828          Antifungals (From admission, onward)      Start     Stop Route Frequency Ordered    06/25/23 1300  miconazole NITRATE 2 % top powder         -- Top Daily 06/25/23 1155    06/23/23 1130  fluconazole tablet 200 mg        Question:  Is the patient competent?  Answer:  Yes    -- Oral Daily  23 1025          Antivirals (From admission, onward)      None              Review of patient's allergies indicates:   Allergen Reactions    Dilaudid [hydromorphone] Anaphylaxis     Other reaction(s): Anaphylaxis  Other reaction(s): Unknown    Zyvox [linezolid in dextrose 5%] Shortness Of Breath     Past Medical History:   Diagnosis Date    *Atrial flutter     Angina pectoris 2017    Anxiety     Arthritis     Asthma     Atrial fibrillation     Back pain     Cataract     OD    Chest pain 2017    CHF (congestive heart failure)     Chronically on benzodiazepine therapy 2019    COPD (chronic obstructive pulmonary disease)     Depression     Diabetes mellitus     Emphysema of lung     Heart failure     Hepatomegaly 2/3/2016    Hernia     History of MI (myocardial infarction) 2016    Hypercapnic respiratory failure, chronic 2016    Hyperlipidemia     Hypertension     Iron deficiency anemia 2/3/2016    Myocardial infarction     Obesity     Peripheral vascular disease     Pneumonia     Polyneuropathy     Retinal detachment     OS    Septic shock 2017    Skin ulcer 3/18/2017    Tobacco dependence     Type II 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, not stated as uncontrolled(250.60)      Past Surgical History:   Procedure Laterality Date    BREAST BIOPSY Left 10 yrs ago    benign    CATARACT EXTRACTION Left     OS      SECTION      x2    EYE SURGERY      HYSTERECTOMY      partial    OOPHORECTOMY      one ovary conserved    RETINAL DETACHMENT SURGERY      buckle --OS    TONSILLECTOMY         Review of Systems:   No chills, fever, sweats, weight loss  She did have some shaking chills which have resolved now  No change in vision, loss of vision or diplopia  No sinus congestion, purulent nasal discharge, post nasal drip or facial pain  No pain in mouth or throat. No problems with teeth, gums.  No chest pain, palpitations, syncope  No cough, sputum  production, shortness of breath, dyspnea on exertion, pleurisy, hemoptysis  No nausea, vomiting, diarrhea, constipation, blood in stool, or focal abd pain,  No dysphagia, odynophagia  No dysuria, hematuria, strangury, retention, incontinence, nocturia, prostatism,   No vaginal/penile discharge, genital ulcers, risk for STD  No swelling of joints, redness of joints, injuries, or new focal pain  No unusual headaches, dizziness, vertigo, numbness, paresthesias, neuropathy, falls  No anxiety, depression, substance abuse, sleep disturbance  No diabetes, thyroid, hypogonadal conditions  No bleeding, lymphadenopathy, anemia, malignancy, unusual bruising  Skin complaints as above.    She suffers with bilateral groin yeast infection, her daughter attends and tries clean dry the area and apply antifungals.     No TB exposure  No recent/prior steroids  Outdoor activities:  Travel:   Implants:   Antibiotic History:     EXAM & DIAGNOSTICS REVIEWED:   Vitals:     Temp:  [97.1 °F (36.2 °C)-98.4 °F (36.9 °C)]   Temp: 98.4 °F (36.9 °C) (06/26/23 0704)  Pulse: (!) 54 (06/26/23 0704)  Resp: 18 (06/26/23 0704)  BP: 134/63 (06/26/23 0704)  SpO2: (!) 93 % (06/26/23 0704)  No intake or output data in the 24 hours ending 06/26/23 0745      General:  In NAD. Alert and attentive, cooperative, comfortable.  Obese.  Eyes:  Anicteric,  EOMI  ENT:  No ulcers, exudates, thrush, nares patent, dentition is fair  Neck:  supple, no masses or adenopathy appreciated  Lungs: Clear, no consolidation, rales, wheezes, rub  Heart:  RRR, no gallop/murmur/rub noted  Abd:  Soft, NT, ND, normal BS, no masses or organomegaly appreciated.  :   , no flank tenderness  Musc:  Joints without effusion, swelling, erythema, synovitis, muscle wasting.   Skin: 06/23/2023 Please see pictures of groin and anterior abdominal wall, some hyperpigmentation in bilateral legs.  She had some pinkish spots on her back and over the left leg, looks kind of an allergic reaction.   Not itching.  06/26/2023 cellulitis is almost resolved.  The yeast infection in the groin almost resolved as well.    No rash in other parts of the skin.   Neuro:             Alert, attentive, speech fluent, face symmetric, moves all extremities, no focal weakness. Ambulatory  Psych: Calm, cooperative.  Pleasant as usual  Lymphatic:     No cervical, supraclavicular, axillary, or inguinal nodes  Extrem: No edema, erythema, phlebitis, cellulitis, warm and well perfused  VAD:       Isolation:    Wound:               Lines/Tubes/Drains:    General Labs reviewed:  Recent Labs   Lab 06/24/23 0439 06/25/23 0523 06/26/23 0447   WBC 7.68 8.70 7.72   HGB 12.6 12.7 12.8   HCT 40.2 40.5 40.8    189 214       Recent Labs   Lab 06/24/23 0439 06/25/23 0523 06/26/23 0447    140 138   K 3.9 3.8 4.7    101 100   CO2 27 29 29   BUN 16 18 15   CREATININE 0.6 0.7 0.6   CALCIUM 9.3 9.3 9.2   PROT 6.5 6.8 6.6   BILITOT 0.4 0.5 0.5   ALKPHOS 70 67 65   ALT 22 26 24   AST 30 29 27     Recent Labs   Lab 06/24/23 0439 06/26/23 0446   CRP 76.2* 19.7*         Micro:  Microbiology Results (last 7 days)       Procedure Component Value Units Date/Time    Blood culture x two cultures. Draw prior to antibiotics. [[id number]] Collected: 06/22/23 1436    Order Status: Completed Specimen: Blood from Peripheral, Left Updated: 06/26/23 0612     Blood Culture, Routine No Growth to date      No Growth to date      No Growth to date      No Growth to date    Narrative:      Aerobic and anaerobic    Blood culture x two cultures. Draw prior to antibiotics. [[id number]] Collected: 06/22/23 1454    Order Status: Completed Specimen: Blood Updated: 06/26/23 0612     Blood Culture, Routine No Growth to date      No Growth to date      No Growth to date      No Growth to date    Narrative:      Aerobic and anaerobic            Imaging Reviewed:   CXR   CT    Cardiology:    IMPRESSION & PLAN     1.         Severe anterior abdominal wall  cellulitis.    Markedly improved to almost resolved    Up-to-date with tetanus vaccine    2.         Bilateral Groin fungal infection.  Left worse than right.  Markedly improved    3.  Some red patches over her back and left leg.  Resolved    4.   PMHx of obesity , BMI of 363, A-fib, CAD, MI, HTN, CHF, COPD, T2DM,A1c 5.1 COPD,    Recommendations:  No objection to discharge on clindamycin 300 mg p.o. q.i.d. x 10 days+ probiotics  Follow with me in the clinic in 1 week post discharge.    This time around, I plan to prescribe chronic suppression therapy for at least 6 months.  Antifungal powder or cream to the groin   Continue working on keeping the area clean and dry.  Patient and her daughter are trying to do so but it is difficult.      Discussed with hospitalist    Medical Decision Making during this encounter was  [_] Low Complexity  [_] Moderate Complexity  [  xx] High Complexity

## 2023-06-26 NOTE — PLAN OF CARE
During MRD's pt requesting home Heavy duty walker for delivery to her home address of [address] and stated that she has not received one in the past 5 years. CM requested orders and sent to [facility] via [facility] for delivery to pts home. AVS updated.    06/26/23 0957   Post-Acute Status   Post-Acute Authorization E   E Status Set-up Complete/Auth obtained   Patient choice form signed by patient/caregiver List with quality metrics by geographic area provided

## 2023-06-26 NOTE — NURSING
IV and tele removed. Orders reviewed. Prescriptions sent to pharmacy. Pt ride here. Pt escorted to car via w/c . Pt d/c home

## 2023-06-26 NOTE — PLAN OF CARE
06/26/23 0948   Medicare Message   Important Message from Medicare regarding Discharge Appeal Rights Given to patient/caregiver;Explained to patient/caregiver;Signed/date by patient/caregiver   Date IMM was signed 06/26/23   Time IMM was signed 0948

## 2023-06-27 ENCOUNTER — PATIENT OUTREACH (OUTPATIENT)
Dept: ADMINISTRATIVE | Facility: CLINIC | Age: 73
End: 2023-06-27
Payer: MEDICARE

## 2023-06-27 NOTE — TELEPHONE ENCOUNTER
@ 5892 call to Kaylen Jeter, regarding heavy duty walker. Transferred to Amber. Advised appears the order was sent via [facility] 06/26 ~ 0958 (per LCSW). Amber kevin [place] # [phone] advised to direct call to Carri. Contacted Carri, she advised nothing for patient in system and advised nothing in [facility]port. @ 1600 secure chat to Marlyn [facility] requesting resend orders to Steffany Nicole. Call to patient, advised of the above. Pt vu. Route to pcp staff to please f/u with pt on receipt.

## 2023-06-27 NOTE — TELEPHONE ENCOUNTER
@ 0841 call to Steffany p [phone], @ 0837 no answer, terminated call. Pt had disconnected during hold, callback to patient, offered to re-attempt call towards end of day or provide pt with Steffany #. Pt request for tcc nurse to re-attempt and have Steffany call her.

## 2023-06-27 NOTE — PROGRESS NOTES
C3 nurse spoke with Nelly Tian for a TCC post hospital discharge follow up call. The patient has a scheduled HOSFU appointment with Constantine Bird MD on 07/07/23 @ 1353.

## 2023-06-28 DIAGNOSIS — I15.2 HYPERTENSION ASSOCIATED WITH DIABETES: ICD-10-CM

## 2023-06-28 DIAGNOSIS — E11.59 HYPERTENSION ASSOCIATED WITH DIABETES: ICD-10-CM

## 2023-06-28 LAB
BACTERIA BLD CULT: NORMAL
BACTERIA BLD CULT: NORMAL

## 2023-06-28 RX ORDER — ATORVASTATIN CALCIUM 10 MG/1
TABLET, FILM COATED ORAL
Qty: 35 TABLET | Refills: 3 | Status: SHIPPED | OUTPATIENT
Start: 2023-06-28

## 2023-06-28 NOTE — TELEPHONE ENCOUNTER
No care due was identified.  [facility] Embedded Care Due Messages. Reference number: [id number].   6/28/2023 5:39:39 AM CDT

## 2023-06-28 NOTE — TELEPHONE ENCOUNTER
Refill Routing Note   Medication(s) are not appropriate for processing by [facility] for the following reason(s):      Patient seen in ED/Hospital since LOV with PCP  Required labs outdated    [facility] action(s):  Defer None identified            Appointments  past 12m or future 3m with PCP    Date Provider   Last Visit   4/18/2023 Constantine Bird MD   Next Visit   7/7/2023 Constantine Bird MD   ED visits in past 90 days: 0        Note composed:12:21 PM 06/28/2023

## 2023-07-03 DIAGNOSIS — B37.89 CANDIDIASIS OF BREAST: ICD-10-CM

## 2023-07-03 DIAGNOSIS — I50.22 CHRONIC SYSTOLIC CONGESTIVE HEART FAILURE: ICD-10-CM

## 2023-07-03 NOTE — TELEPHONE ENCOUNTER
No care due was identified.  [facility] Embedded Care Due Messages. Reference number: [id number].   7/03/2023 5:20:31 PM CDT

## 2023-07-03 NOTE — TELEPHONE ENCOUNTER
Refill Routing Note   Medication(s) are not appropriate for processing by [facility] for the following reason(s):      Patient taking medicaton differently than prescribed    [facility] action(s):  Defer Care Due:  None identified          Appointments  past 12m or future 3m with PCP    Date Provider   Last Visit   4/18/2023 Constantine Bird MD   Next Visit   7/7/2023 Constantine Bird MD   ED visits in past 90 days: 0        Note composed:6:07 PM 07/03/2023

## 2023-07-03 NOTE — TELEPHONE ENCOUNTER
Refill request routed to [facility] Review Pool for Pharmacist Review.

## 2023-07-03 NOTE — PHYSICIAN QUERY
PT Name: Nelly Tian  MR #: [id number]     DOCUMENTATION CLARIFICATION     CDS/: Radha Clark RN          Contact Information: [email or web address]    This form is a permanent document in the medical record.     Query Date: July 3, 2023    By submitting this query, we are merely seeking further clarification of documentation.  Please utilize your independent clinical judgment when addressing the question(s) below.  The Medical Record contains the following:  Indicators Supporting Clinical Findings Location in Medical Record   x HR         RR          BP        Temp T 100.1-102.1, HR , RR 22-24, /70  T 97.6-101.1, HR60-77, RR 16-18, /59  T 97.8-98.7, HR 58-66, RR 18, /63 6/22/2023 vitals  6/2/2023 vitals  6/24/2023 vitals   x Lactic Acid          Procalcitonin  06/22/23 14:36 06/22/23 17:48   Lactate, [place] 2.5 (H) 2.1    Lab results   x WBC           Bands          CRP     06/22/23 14:36 06/23/23 03:31   WBC 13.89 (H) 9.90      06/24/23 04:39 06/26/23 04:46   CRP 76.2 (H) 19.7 (H)    Lab results     Culture(s)      AMS, Confusion, LOC, etc.      Organ Dysfunction/Failure     x Bacteremia or Sepsis / Septic She will be admitted for sepsis secondary to wall cellulitis 6/22/2023 H&P    x Known or Suspected Source of Infection documented Abdominal wall cellulitis 6/22/2023 H&P-6/26/2023 Discharge Summary     (Failed) Outpatient Treatment     x Medication piperacillin-tazobactam (ZOSYN) 4.5 g IVPB x1   acetaminophen tablet 650-1000 mg oral x6   vancomycin (VANCOCIN) 1,000-2,000 mg q12 hrs  clindamycin capsule 300 mg oral q6 hrs   fluconazole tablet 200 mg oral daily  6/22/2023 medication  6/22/2023-6/26/2023 medications    6/23/2023-6/26/2023 medications     Treatment      Other          Provider, please specify diagnosis or diagnoses associated with above clinical findings.    [   ] Sepsis Ruled Out. Abdominal wall cellulitis infection only.   [ x ] Sepsis due to unknown  organism   [   ] Other Infectious Disease (please specify): __________   [  ] Clinically Undetermined         Please document in your progress notes daily for the duration of treatment until resolved and include in your discharge summary.

## 2023-07-03 NOTE — TELEPHONE ENCOUNTER
Refill Routing Note   Medication(s) are not appropriate for processing by [facility] for the following reason(s):      Medication outside of protocol    [facility] action(s):  Route None identified          Appointments  past 12m or future 3m with PCP    Date Provider   Last Visit   4/18/2023 Constantine Bird MD   Next Visit   7/7/2023 Constantine Bird MD   ED visits in past 90 days: 0        Note composed:9:26 AM 07/03/2023

## 2023-07-04 RX ORDER — NYSTATIN 100000 [USP'U]/G
POWDER TOPICAL
Qty: 120 G | Refills: 11 | Status: SHIPPED | OUTPATIENT
Start: 2023-07-04

## 2023-07-04 RX ORDER — FUROSEMIDE 40 MG/1
TABLET ORAL
Qty: 6 TABLET | Refills: 0 | Status: SHIPPED | OUTPATIENT
Start: 2023-07-04 | End: 2023-07-31 | Stop reason: SDUPTHER

## 2023-07-05 ENCOUNTER — PATIENT OUTREACH (OUTPATIENT)
Dept: ADMINISTRATIVE | Facility: OTHER | Age: 73
End: 2023-07-05
Payer: MEDICARE

## 2023-07-05 ENCOUNTER — TELEPHONE (OUTPATIENT)
Dept: ADMINISTRATIVE | Facility: CLINIC | Age: 73
End: 2023-07-05
Payer: MEDICARE

## 2023-07-05 ENCOUNTER — OFFICE VISIT (OUTPATIENT)
Dept: INFECTIOUS DISEASES | Facility: CLINIC | Age: 73
End: 2023-07-05
Payer: MEDICARE

## 2023-07-05 VITALS
OXYGEN SATURATION: 97 % | WEIGHT: 273.38 LBS | TEMPERATURE: 98 F | BODY MASS INDEX: 41.43 KG/M2 | DIASTOLIC BLOOD PRESSURE: 68 MMHG | SYSTOLIC BLOOD PRESSURE: 136 MMHG | HEART RATE: 50 BPM | HEIGHT: 68 IN

## 2023-07-05 DIAGNOSIS — B37.2 YEAST DERMATITIS: ICD-10-CM

## 2023-07-05 DIAGNOSIS — L03.90 RECURRENT CELLULITIS: ICD-10-CM

## 2023-07-05 DIAGNOSIS — L03.311 ABDOMINAL WALL CELLULITIS: Primary | ICD-10-CM

## 2023-07-05 PROCEDURE — 4010F ACE/ARB THERAPY RXD/TAKEN: CPT | Mod: CPTII,S$GLB,, | Performed by: INTERNAL MEDICINE

## 2023-07-05 PROCEDURE — 1101F PT FALLS ASSESS-DOCD LE1/YR: CPT | Mod: CPTII,S$GLB,, | Performed by: INTERNAL MEDICINE

## 2023-07-05 PROCEDURE — 99215 PR OFFICE/OUTPT VISIT, EST, LEVL V, 40-54 MIN: ICD-10-PCS | Mod: S$GLB,,, | Performed by: INTERNAL MEDICINE

## 2023-07-05 PROCEDURE — 1126F PR PAIN SEVERITY QUANTIFIED, NO PAIN PRESENT: ICD-10-PCS | Mod: CPTII,S$GLB,, | Performed by: INTERNAL MEDICINE

## 2023-07-05 PROCEDURE — 4010F PR ACE/ARB THEARPY RXD/TAKEN: ICD-10-PCS | Mod: CPTII,S$GLB,, | Performed by: INTERNAL MEDICINE

## 2023-07-05 PROCEDURE — 1157F ADVNC CARE PLAN IN RCRD: CPT | Mod: CPTII,S$GLB,, | Performed by: INTERNAL MEDICINE

## 2023-07-05 PROCEDURE — 3078F PR MOST RECENT DIASTOLIC BLOOD PRESSURE < 80 MM HG: ICD-10-PCS | Mod: CPTII,S$GLB,, | Performed by: INTERNAL MEDICINE

## 2023-07-05 PROCEDURE — 1157F PR ADVANCE CARE PLAN OR EQUIV PRESENT IN MEDICAL RECORD: ICD-10-PCS | Mod: CPTII,S$GLB,, | Performed by: INTERNAL MEDICINE

## 2023-07-05 PROCEDURE — 3008F BODY MASS INDEX DOCD: CPT | Mod: CPTII,S$GLB,, | Performed by: INTERNAL MEDICINE

## 2023-07-05 PROCEDURE — 3044F HG A1C LEVEL LT 7.0%: CPT | Mod: CPTII,S$GLB,, | Performed by: INTERNAL MEDICINE

## 2023-07-05 PROCEDURE — 1111F DSCHRG MED/CURRENT MED MERGE: CPT | Mod: CPTII,S$GLB,, | Performed by: INTERNAL MEDICINE

## 2023-07-05 PROCEDURE — 1111F PR DISCHARGE MEDS RECONCILED W/ CURRENT OUTPATIENT MED LIST: ICD-10-PCS | Mod: CPTII,S$GLB,, | Performed by: INTERNAL MEDICINE

## 2023-07-05 PROCEDURE — 3075F PR MOST RECENT SYSTOLIC BLOOD PRESS GE 130-139MM HG: ICD-10-PCS | Mod: CPTII,S$GLB,, | Performed by: INTERNAL MEDICINE

## 2023-07-05 PROCEDURE — 1101F PR PT FALLS ASSESS DOC 0-1 FALLS W/OUT INJ PAST YR: ICD-10-PCS | Mod: CPTII,S$GLB,, | Performed by: INTERNAL MEDICINE

## 2023-07-05 PROCEDURE — 3008F PR BODY MASS INDEX (BMI) DOCUMENTED: ICD-10-PCS | Mod: CPTII,S$GLB,, | Performed by: INTERNAL MEDICINE

## 2023-07-05 PROCEDURE — 99215 OFFICE O/P EST HI 40 MIN: CPT | Mod: S$GLB,,, | Performed by: INTERNAL MEDICINE

## 2023-07-05 PROCEDURE — 3288F FALL RISK ASSESSMENT DOCD: CPT | Mod: CPTII,S$GLB,, | Performed by: INTERNAL MEDICINE

## 2023-07-05 PROCEDURE — 3288F PR FALLS RISK ASSESSMENT DOCUMENTED: ICD-10-PCS | Mod: CPTII,S$GLB,, | Performed by: INTERNAL MEDICINE

## 2023-07-05 PROCEDURE — 3075F SYST BP GE 130 - 139MM HG: CPT | Mod: CPTII,S$GLB,, | Performed by: INTERNAL MEDICINE

## 2023-07-05 PROCEDURE — 1159F MED LIST DOCD IN RCRD: CPT | Mod: CPTII,S$GLB,, | Performed by: INTERNAL MEDICINE

## 2023-07-05 PROCEDURE — 3078F DIAST BP <80 MM HG: CPT | Mod: CPTII,S$GLB,, | Performed by: INTERNAL MEDICINE

## 2023-07-05 PROCEDURE — 1126F AMNT PAIN NOTED NONE PRSNT: CPT | Mod: CPTII,S$GLB,, | Performed by: INTERNAL MEDICINE

## 2023-07-05 PROCEDURE — 1159F PR MEDICATION LIST DOCUMENTED IN MEDICAL RECORD: ICD-10-PCS | Mod: CPTII,S$GLB,, | Performed by: INTERNAL MEDICINE

## 2023-07-05 PROCEDURE — 3044F PR MOST RECENT HEMOGLOBIN A1C LEVEL <7.0%: ICD-10-PCS | Mod: CPTII,S$GLB,, | Performed by: INTERNAL MEDICINE

## 2023-07-05 RX ORDER — FLUCONAZOLE 100 MG/1
200 TABLET ORAL DAILY
Qty: 60 TABLET | Refills: 0 | Status: SHIPPED | OUTPATIENT
Start: 2023-07-05 | End: 2023-08-14 | Stop reason: SDUPTHER

## 2023-07-05 RX ORDER — CEFADROXIL 500 MG/1
500 CAPSULE ORAL EVERY 12 HOURS
Qty: 180 CAPSULE | Refills: 1 | Status: SHIPPED | OUTPATIENT
Start: 2023-07-05 | End: 2023-10-26 | Stop reason: SDUPTHER

## 2023-07-05 NOTE — PATIENT INSTRUCTIONS
Complete clindamycin    Then start suppression with cefadroxil          For the groin:  --- Try Desitin  -- Try Vit A+D ointment   -- Try Tea tree oil-- diluted      I ordered Diflucan  daily for one week then weekly         
No

## 2023-07-06 ENCOUNTER — TELEPHONE (OUTPATIENT)
Dept: OPHTHALMOLOGY | Facility: CLINIC | Age: 73
End: 2023-07-06
Payer: MEDICARE

## 2023-07-06 ENCOUNTER — OFFICE VISIT (OUTPATIENT)
Dept: FAMILY MEDICINE | Facility: CLINIC | Age: 73
End: 2023-07-06
Payer: MEDICARE

## 2023-07-06 VITALS
OXYGEN SATURATION: 92 % | TEMPERATURE: 98 F | HEIGHT: 68 IN | BODY MASS INDEX: 40.23 KG/M2 | WEIGHT: 265.44 LBS | HEART RATE: 57 BPM | SYSTOLIC BLOOD PRESSURE: 138 MMHG | DIASTOLIC BLOOD PRESSURE: 70 MMHG

## 2023-07-06 DIAGNOSIS — L03.311 ABDOMINAL WALL CELLULITIS: Primary | ICD-10-CM

## 2023-07-06 PROCEDURE — 99495 TCM SERVICES (MODERATE COMPLEXITY): ICD-10-PCS | Mod: S$GLB,,, | Performed by: NURSE PRACTITIONER

## 2023-07-06 PROCEDURE — 3044F HG A1C LEVEL LT 7.0%: CPT | Mod: CPTII,S$GLB,, | Performed by: NURSE PRACTITIONER

## 2023-07-06 PROCEDURE — 3008F BODY MASS INDEX DOCD: CPT | Mod: CPTII,S$GLB,, | Performed by: NURSE PRACTITIONER

## 2023-07-06 PROCEDURE — 99999 PR PBB SHADOW E&M-EST. PATIENT-LVL III: CPT | Mod: PBBFAC,,, | Performed by: NURSE PRACTITIONER

## 2023-07-06 PROCEDURE — 1126F PR PAIN SEVERITY QUANTIFIED, NO PAIN PRESENT: ICD-10-PCS | Mod: CPTII,S$GLB,, | Performed by: NURSE PRACTITIONER

## 2023-07-06 PROCEDURE — 1101F PT FALLS ASSESS-DOCD LE1/YR: CPT | Mod: CPTII,S$GLB,, | Performed by: NURSE PRACTITIONER

## 2023-07-06 PROCEDURE — 3288F PR FALLS RISK ASSESSMENT DOCUMENTED: ICD-10-PCS | Mod: CPTII,S$GLB,, | Performed by: NURSE PRACTITIONER

## 2023-07-06 PROCEDURE — 99495 TRANSJ CARE MGMT MOD F2F 14D: CPT | Mod: S$GLB,,, | Performed by: NURSE PRACTITIONER

## 2023-07-06 PROCEDURE — 99999 PR PBB SHADOW E&M-EST. PATIENT-LVL III: ICD-10-PCS | Mod: PBBFAC,,, | Performed by: NURSE PRACTITIONER

## 2023-07-06 PROCEDURE — 3075F PR MOST RECENT SYSTOLIC BLOOD PRESS GE 130-139MM HG: ICD-10-PCS | Mod: CPTII,S$GLB,, | Performed by: NURSE PRACTITIONER

## 2023-07-06 PROCEDURE — 1111F PR DISCHARGE MEDS RECONCILED W/ CURRENT OUTPATIENT MED LIST: ICD-10-PCS | Mod: CPTII,S$GLB,, | Performed by: NURSE PRACTITIONER

## 2023-07-06 PROCEDURE — 1157F ADVNC CARE PLAN IN RCRD: CPT | Mod: CPTII,S$GLB,, | Performed by: NURSE PRACTITIONER

## 2023-07-06 PROCEDURE — 3288F FALL RISK ASSESSMENT DOCD: CPT | Mod: CPTII,S$GLB,, | Performed by: NURSE PRACTITIONER

## 2023-07-06 PROCEDURE — 3078F DIAST BP <80 MM HG: CPT | Mod: CPTII,S$GLB,, | Performed by: NURSE PRACTITIONER

## 2023-07-06 PROCEDURE — 1126F AMNT PAIN NOTED NONE PRSNT: CPT | Mod: CPTII,S$GLB,, | Performed by: NURSE PRACTITIONER

## 2023-07-06 PROCEDURE — 3044F PR MOST RECENT HEMOGLOBIN A1C LEVEL <7.0%: ICD-10-PCS | Mod: CPTII,S$GLB,, | Performed by: NURSE PRACTITIONER

## 2023-07-06 PROCEDURE — 3075F SYST BP GE 130 - 139MM HG: CPT | Mod: CPTII,S$GLB,, | Performed by: NURSE PRACTITIONER

## 2023-07-06 PROCEDURE — 1101F PR PT FALLS ASSESS DOC 0-1 FALLS W/OUT INJ PAST YR: ICD-10-PCS | Mod: CPTII,S$GLB,, | Performed by: NURSE PRACTITIONER

## 2023-07-06 PROCEDURE — 4010F PR ACE/ARB THEARPY RXD/TAKEN: ICD-10-PCS | Mod: CPTII,S$GLB,, | Performed by: NURSE PRACTITIONER

## 2023-07-06 PROCEDURE — 4010F ACE/ARB THERAPY RXD/TAKEN: CPT | Mod: CPTII,S$GLB,, | Performed by: NURSE PRACTITIONER

## 2023-07-06 PROCEDURE — 1111F DSCHRG MED/CURRENT MED MERGE: CPT | Mod: CPTII,S$GLB,, | Performed by: NURSE PRACTITIONER

## 2023-07-06 PROCEDURE — 1159F MED LIST DOCD IN RCRD: CPT | Mod: CPTII,S$GLB,, | Performed by: NURSE PRACTITIONER

## 2023-07-06 PROCEDURE — 3008F PR BODY MASS INDEX (BMI) DOCUMENTED: ICD-10-PCS | Mod: CPTII,S$GLB,, | Performed by: NURSE PRACTITIONER

## 2023-07-06 PROCEDURE — 1159F PR MEDICATION LIST DOCUMENTED IN MEDICAL RECORD: ICD-10-PCS | Mod: CPTII,S$GLB,, | Performed by: NURSE PRACTITIONER

## 2023-07-06 PROCEDURE — 1157F PR ADVANCE CARE PLAN OR EQUIV PRESENT IN MEDICAL RECORD: ICD-10-PCS | Mod: CPTII,S$GLB,, | Performed by: NURSE PRACTITIONER

## 2023-07-06 PROCEDURE — 3078F PR MOST RECENT DIASTOLIC BLOOD PRESSURE < 80 MM HG: ICD-10-PCS | Mod: CPTII,S$GLB,, | Performed by: NURSE PRACTITIONER

## 2023-07-06 RX ORDER — LISINOPRIL 20 MG/1
TABLET ORAL
COMMUNITY
Start: 2023-04-25 | End: 2024-02-23 | Stop reason: SDUPTHER

## 2023-07-06 RX ORDER — NYSTATIN AND TRIAMCINOLONE ACETONIDE 100000; 1 [USP'U]/G; MG/G
CREAM TOPICAL 3 TIMES DAILY
COMMUNITY
Start: 2023-07-03 | End: 2023-07-31 | Stop reason: SDUPTHER

## 2023-07-06 RX ORDER — FLUTICASONE FUROATE, UMECLIDINIUM BROMIDE AND VILANTEROL TRIFENATATE 100; 62.5; 25 UG/1; UG/1; UG/1
POWDER RESPIRATORY (INHALATION)
COMMUNITY
Start: 2023-04-25 | End: 2023-12-04 | Stop reason: SDUPTHER

## 2023-07-06 NOTE — PROGRESS NOTES
This dictation has been generated using Modal Fluency Dictation some phonetic errors may occur. Please contact author for clarification if needed.     Problem List Items Addressed This Visit       Abdominal wall cellulitis - Primary            Abdominal wall cellulitis completing clindamycin.  Infectious Disease has provided patient with prescription for Duricef and Diflucan.  Patient will need clearance from Infectious Disease for cataract surgery and follow-up with us for clearance.    Follow up in about 1 month (around 8/6/2023).    ________________________________________________________________  ________________________________________________________________      Chief Complaint   Patient presents with    Hospital Follow Up     History of present illness  This 72 y.o. presents today for complaint of hospital follow-up.  Patient following with Infectious Disease.  She completes the clindamycin soon.  She did see Infectious Disease yesterday who had started Duricef and Diflucan upon completion of clindamycin she will start those meds.  Notes improvement in her abdominal infection.  Transitional Care Note    Family and/or Caretaker present at visit?  Yes.  Diagnostic tests reviewed/disposition: No diagnosic tests pending after this hospitalization.  Disease/illness education: completing abx. Need for ID clearance.   Home health/community services discussion/referrals: Patient does not have home health established from hospital visit.  They do not need home health.  If needed, we will set up home health for the patient.   Establishment or re-establishment of referral orders for community resources: No other necessary community resources.   Discussion with other health care providers: No discussion with other health care providers necessary.          Admission Date: 6/22/2023  Hospital Length of Stay: 2 days  Discharge Date and Time:  06/26/2023 10:05 AM  Attending Physician: Brennan Wagner MD   Discharging  Provider: Brennan Wagner MD  Primary Care Provider: Constantine Bird MD     Primary Care Team: Networked reference to record PCT      HPI:   Patient is a 72-year-old female with history of AFib on Eliquis, combined heart failure, COPD, type 2 diabetes, COPD, numerous episodes of abdominal cellulitis presents to the ED with complaint recurrent abdominal wall cellulitis.  Patient reports she was feeling well yesterday but today began to have fever and chills and pain over the left side of her abdominal wall.  She notes some skin breakdown in the area that she thinks the infection entered through.  She denies any injury to the area.  She reports she is not been on suppressive antibiotic for many months.  In the past had been on suppressive cefadroxil per Dr. Juan.  In the ED she is febrile with an elevated white count and lactic acid.  She will be admitted for sepsis secondary to wall cellulitis.  Infectious Disease will be consulted.  She will be given IV vancomycin.        * No surgery found *       Hospital Course:   72-year-old female with AFib on Eliquis, combined heart failure, COPD, type 2 diabetes, COPD, numerous episodes of abdominal cellulitis admitted to the ED with recurrent abdominal wall cellulitis. Also with extensive groin intertrigo. She improved on regimen of vancomycin, clindamycin, fluconazole per Infectious Disease recommendation.  Wound Care was consulted. Also utilized miconazole powder. She had nearly complete resolution of her cellulitis by time of discharge. Prescribed oral clindamycin to continue as well as the topical miconazole. Encouraged to take probiotics as well. To f/u with PCP and infectious disease clinic. By time of discharge the patient was tolerating a regular diet with resolving admission symptoms. Patient seen and examined on day of discharge.       Past Medical History:   Diagnosis Date    *Atrial flutter     Angina pectoris 9/18/2017    Anxiety     Arthritis     Asthma      Atrial fibrillation     Back pain     Cataract     OD    Chest pain 2017    CHF (congestive heart failure)     Chronically on benzodiazepine therapy 2019    COPD (chronic obstructive pulmonary disease)     Depression     Diabetes mellitus     Emphysema of lung     Heart failure     Hepatomegaly 2/3/2016    Hernia     History of MI (myocardial infarction) 2016    Hypercapnic respiratory failure, chronic 2016    Hyperlipidemia     Hypertension     Iron deficiency anemia 2/3/2016    Myocardial infarction     Obesity     Peripheral vascular disease     Pneumonia     Polyneuropathy     Retinal detachment     OS    Septic shock 2017    Skin ulcer 3/18/2017    Tobacco dependence     Type II 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, not stated as uncontrolled(250.60)        Past Surgical History:   Procedure Laterality Date    BREAST BIOPSY Left 10 yrs ago    benign    CATARACT EXTRACTION Left     OS      SECTION      x2    EYE SURGERY      HYSTERECTOMY      partial    OOPHORECTOMY      one ovary conserved    RETINAL DETACHMENT SURGERY      buckle --OS    TONSILLECTOMY         Family History   Problem Relation Age of Onset    Alcohol abuse Mother     Cirrhosis Mother     Arthritis Father     Heart disease Father     Heart attack Father     Arrhythmia Father     Coronary artery disease Father     Coronary artery disease Sister     Early death Sister 30        heart disease    Heart disease Sister 32        MI    Heart attack Sister     Arthritis Sister     Heart disease Sister     Crohn's disease Sister     Cancer Brother         Lung cancer    Lung cancer Brother     No Known Problems Brother     No Known Problems Daughter     Blindness Son         due to accident//    Breast cancer Neg Hx     Glaucoma Neg Hx     Macular degeneration Neg Hx     Retinal detachment Neg Hx     Amblyopia Neg Hx     Diabetes Neg Hx     Cataracts Neg Hx     Hypertension Neg Hx      Strabismus Neg Hx     Stroke Neg Hx     Thyroid disease Neg Hx        Social History     Socioeconomic History    Marital status:    Tobacco Use    Smoking status: Former     Packs/day: 0.30     Years: 50.00     Pack years: 15.00     Types: Cigarettes     Start date: 1968     Quit date: 2022     Years since quittin.0    Smokeless tobacco: Never   Substance and Sexual Activity    Alcohol use: No     Alcohol/week: 0.0 standard drinks    Drug use: No    Sexual activity: Not Currently     Social Determinants of Health     Financial Resource Strain: Medium Risk    Difficulty of Paying Living Expenses: Somewhat hard   Food Insecurity: Food Insecurity Present    Worried About Running Out of Food in the Last Year: Sometimes true    Ran Out of Food in the Last Year: Sometimes true   Transportation Needs: Unmet Transportation Needs    Lack of Transportation (Medical): Yes    Lack of Transportation (Non-Medical): Yes   Physical Activity: Insufficiently Active    Days of Exercise per Week: 2 days    Minutes of Exercise per Session: 20 min   Stress: No Stress Concern Present    Feeling of Stress : Not at all   Social Connections: Moderately Integrated    Frequency of Communication with Friends and Family: Once a week    Frequency of Social Gatherings with Friends and Family: Three times a week    Attends Yarsanism Services: More than 4 times per year    Active Member of Clubs or Organizations: No    Attends Club or Organization Meetings: 1 to 4 times per year    Marital Status:    Housing Stability: Low Risk     Unable to Pay for Housing in the Last Year: No    Number of Places Lived in the Last Year: 1    Unstable Housing in the Last Year: No       Current Outpatient Medications   Medication Sig Dispense Refill    albuterol (PROVENTIL/VENTOLIN HFA) 90 mcg/actuation inhaler INHALE 2 PUFFS EVERY 6 HOURS AS NEEDED FOR WHEEZING  (RESCUE) (Patient taking differently: Inhale 2 puffs into the lungs every 6  (six) hours as needed.) 54 g 3    apixaban (ELIQUIS) 5 mg Tab Take 1 tablet (5 mg total) by mouth 2 (two) times daily. 180 tablet 3    ascorbic acid, vitamin C, (VITAMIN C) 500 MG tablet Take 2 tablets (1,000 mg total) by mouth every evening.      atorvastatin (LIPITOR) 10 MG tablet TAKE 1 TABLET(10 MG) BY MOUTH EVERY OTHER DAY 35 tablet 3    blood sugar diagnostic Strp To check BG 2 times daily, to use with insurance preferred meter 100 each 5    blood-glucose meter kit To check BG 2 times daily, to use with insurance preferred meter 1 each 1    cefadroxil (DURICEF) 500 MG Cap Take 1 capsule (500 mg total) by mouth every 12 (twelve) hours. 180 capsule 1    clindamycin (CLEOCIN) 300 MG capsule Take 1 capsule (300 mg total) by mouth every 6 (six) hours. for 10 days 40 capsule 0    empagliflozin (JARDIANCE) 25 mg tablet Take 1 tablet (25 mg total) by mouth once daily. 90 tablet 4    fluconazole (DIFLUCAN) 100 MG tablet Take 2 tablets (200 mg total) by mouth once daily. For 1 week then weekly 60 tablet 0    fluticasone propionate (FLONASE) 50 mcg/actuation nasal spray 1 spray (50 mcg total) by Each Nostril route once daily. 48 g 3    furosemide (LASIX) 40 MG tablet TAKE 1 TABLET(40 MG) BY MOUTH TWICE DAILY FOR 3 DAYS 6 tablet 0    gabapentin (NEURONTIN) 800 MG tablet Take 1 tablet (800 mg total) by mouth 3 (three) times daily. 90 tablet 11    HYDROcodone-acetaminophen (NORCO) 7.5-325 mg per tablet Take 1 tablet by mouth every 12 (twelve) hours as needed for Pain. Medical necessary for greater than 7 days for chronic pain. 60 tablet 0    Lactobacillus acidophilus 1 billion cell Cap Take 1 capsule by mouth 3 (three) times daily with meals. 60 capsule 0    lancets Misc To check BG 2 times daily, to use with insurance preferred meter 100 each 5    LIDOcaine (LIDODERM) 5 % APPLY PATCH ONTO SKIN.REMOVE AND DISCARD PATCH WITHIN 12 HOURS OR AS DIRECTED BY MD 30 patch 3    lisinopriL (PRINIVIL,ZESTRIL) 20 MG tablet        melatonin 10 mg Tab Take 10 mg by mouth nightly.      metFORMIN (GLUCOPHAGE) 500 MG tablet Take 1 tablet (500 mg total) by mouth daily with breakfast. 90 tablet 3    miconazole NITRATE 2 % (MICOTIN) 2 % top powder Apply topically once daily. Apply to areas of inflamed skin folds for 7 days 85 g 0    multivitamin (THERAGRAN) tablet Take 1 tablet by mouth once daily.      nystatin (NYSTOP) powder APPLY TOPICALLY TO THE AFFECTED AREA TWICE DAILY 120 g 11    nystatin-triamcinolone (MYCOLOG II) cream Apply topically 3 (three) times daily.      ondansetron (ZOFRAN-ODT) 8 MG TbDL Take 1 tablet (8 mg total) by mouth every 12 (twelve) hours as needed. 40 tablet 3    potassium chloride SA (K-DUR,KLOR-CON) 20 MEQ tablet Take 1 tablet (20 mEq total) by mouth once daily. 90 tablet 3    spironolactone (ALDACTONE) 25 MG tablet Take 1 tablet (25 mg total) by mouth once daily. 90 tablet 4    TRELEGY ELLIPTA 100-62.5-25 mcg DsDv        No current facility-administered medications for this visit.       Review of patient's allergies indicates:   Allergen Reactions    Dilaudid [hydromorphone] Anaphylaxis     Other reaction(s): Anaphylaxis  Other reaction(s): Unknown    Zyvox [linezolid in dextrose 5%] Shortness Of Breath       Physical examination  Vitals Reviewed\  Vitals:    07/06/23 0834   BP: 138/70   Pulse: (!) 57   Temp: 98 °F (36.7 °C)     Body mass index is 40.36 kg/m². Weight: 120.4 kg (265 lb 6.9 oz)    Gen. Well-dressed well-nourished   Skin warm dry and intact.  No rashes noted.  Chest.  Respirations are even unlabored.  Lungs are clear to auscultation.  Cardiac regular rate and rhythm.  No chest wall adenopathy noted.  Neuro. Awake alert oriented x4.  Normal judgment and cognition noted.  Extremities no clubbing cyanosis or edema noted.     Call or return to clinic prn if these symptoms worsen or fail to improve as anticipated.

## 2023-07-06 NOTE — TELEPHONE ENCOUNTER
Faxed clearance form  ----- Message from Domenico Gilman sent at 7/6/2023 10:05 AM CDT -----  Regarding: clearance request  Type:  Needs Medical Advice    Who Called: Pt    Would the patient rather a call back or a response via [facility]? Call back  Best Call Back Number: [phone]    Additional Information: Sts that she just got out of the hospital with septi sis  and has an upcoming procedure with Dr Bautista and said that he will need to send a request for clearance to Dr Melina Silva [phone].  Please advise -- thank you

## 2023-07-06 NOTE — PATIENT INSTRUCTIONS
Michel Villegas,     Discuss clearance with infectious disease doctor Drake and get clearance.       If you are due for any health screening(s) below please notify me so we can arrange them to be ordered and scheduled to maintain your health. Most healthy patients complete it. Don't lose out on improving your health.     All of your core healthy metrics are met.         Colon Cancer Screening    Of cancers affecting both men and women, colorectal cancer is the third leading cancer killer in the [place]. But it doesnt have to be. Screening can prevent colorectal cancer or find it at an early stage when treatment often leads to a cure.    A colonoscopy is the preferred test for detecting colon cancer. It is needed only once every 10 years if results are negative. While sedated, a flexible, lighted tube with a tiny camera is inserted into the rectum and advanced through the colon to look for cancers. An alternative screening test that is used at home and returned to the lab may also be used. It detects hidden blood in bowel movements which could indicate cancer in the colon. If results are positive, you will need a colonoscopy to determine if the blood is a sign of cancer. This type of follow up (diagnostic) colonoscopy usually requires additional copays as required by your insurance provider. Please contact your PCP if you have any questions.

## 2023-07-07 RX ORDER — LISINOPRIL 20 MG/1
20 TABLET ORAL 2 TIMES DAILY
Qty: 180 TABLET | OUTPATIENT
Start: 2023-07-07

## 2023-07-07 NOTE — PROGRESS NOTES
Spoke with pt gave dosing and next inr check date pt voiced understanding.   hh faxed    Writer attempted to reach patient regarding the Atorvastatin as it had been refilled for a year. Was not able to reach patient. Left voicemail for the , Matt to call back.

## 2023-07-07 NOTE — TELEPHONE ENCOUNTER
No care due was identified.  Health [facility] Embedded Care Due Messages. Reference number: [id number].   7/07/2023 3:41:57 AM CDT

## 2023-07-07 NOTE — TELEPHONE ENCOUNTER
Refill Decision Note   Nelly Tian  is requesting a refill authorization.  Brief Assessment and Rationale for Refill:  Quick Discontinue     Medication Therapy Plan:  Lisinopril discontinued in favor of spironolactone therapy      Comments:     Note composed:11:06 AM 07/07/2023

## 2023-07-11 ENCOUNTER — TELEPHONE (OUTPATIENT)
Dept: INFECTIOUS DISEASES | Facility: CLINIC | Age: 73
End: 2023-07-11

## 2023-07-11 NOTE — TELEPHONE ENCOUNTER
Patient does not have an active infection.    She is on chronic suppression therapy for anterior abdominal wall cellulitis and bilateral groin yeast.    There are no objections to any kind of surgery from infectious disease stand point.    Patient is medically optimized from Infectious Disease standpoint, for any kind of surgery.

## 2023-07-12 ENCOUNTER — TELEPHONE (OUTPATIENT)
Dept: FAMILY MEDICINE | Facility: CLINIC | Age: 73
End: 2023-07-12
Payer: MEDICARE

## 2023-07-12 ENCOUNTER — PATIENT MESSAGE (OUTPATIENT)
Dept: FAMILY MEDICINE | Facility: CLINIC | Age: 73
End: 2023-07-12
Payer: MEDICARE

## 2023-07-12 NOTE — TELEPHONE ENCOUNTER
----- Message from Alessia Mares, Patient Care Assistant sent at 7/12/2023  2:16 PM CDT -----  Contact: self  Type:  Patient Returning Call    Who Called:  self  Who Left Message for Patient:  Tonya  Does the patient know what this is regarding?:  clearance   Best Call Back Number:  [phone]  Additional Information:  thanks

## 2023-07-12 NOTE — TELEPHONE ENCOUNTER
Called patient to schedule surgery clearance appointment. No answer , left voicemail to return call.

## 2023-07-12 NOTE — TELEPHONE ENCOUNTER
----- Message from Roselia Grullon sent at 7/12/2023 12:41 PM CDT -----  Regarding: Needs Medical Advice  Contact: Latoya Sawyer at [phone]  Type: Needs Medical Advice  Who Called: Latoya Sawyer at [phone]    Additional Information: Dr. Vasquez cleared patient for surgery. Patient would like to know if she could be seen virtually with Dr. Russell if an appointment is needed. Please call patient and advise. [phone]    Thank you

## 2023-07-13 ENCOUNTER — OFFICE VISIT (OUTPATIENT)
Dept: FAMILY MEDICINE | Facility: CLINIC | Age: 73
End: 2023-07-13
Payer: MEDICARE

## 2023-07-13 ENCOUNTER — TELEPHONE (OUTPATIENT)
Dept: OPHTHALMOLOGY | Facility: CLINIC | Age: 73
End: 2023-07-13
Payer: MEDICARE

## 2023-07-13 VITALS
HEIGHT: 68 IN | TEMPERATURE: 99 F | WEIGHT: 255.75 LBS | HEART RATE: 72 BPM | OXYGEN SATURATION: 94 % | BODY MASS INDEX: 38.76 KG/M2

## 2023-07-13 DIAGNOSIS — I50.42 CHRONIC COMBINED SYSTOLIC AND DIASTOLIC CHF (CONGESTIVE HEART FAILURE): ICD-10-CM

## 2023-07-13 DIAGNOSIS — J44.9 CHRONIC OBSTRUCTIVE PULMONARY DISEASE, UNSPECIFIED COPD TYPE: ICD-10-CM

## 2023-07-13 DIAGNOSIS — H26.9 CATARACT, UNSPECIFIED CATARACT TYPE, UNSPECIFIED LATERALITY: ICD-10-CM

## 2023-07-13 DIAGNOSIS — Z01.818 PRE-OP EVALUATION: Primary | ICD-10-CM

## 2023-07-13 DIAGNOSIS — F11.20 UNCOMPLICATED OPIOID DEPENDENCE: ICD-10-CM

## 2023-07-13 DIAGNOSIS — D50.1 IRON DEFICIENCY ANEMIA DUE TO SIDEROPENIC DYSPHAGIA: ICD-10-CM

## 2023-07-13 DIAGNOSIS — F33.9 MAJOR DEPRESSION, RECURRENT, CHRONIC: ICD-10-CM

## 2023-07-13 DIAGNOSIS — E83.52 PARATHYROID RELATED HYPERCALCEMIA: ICD-10-CM

## 2023-07-13 DIAGNOSIS — I50.22 CHF (CONGESTIVE HEART FAILURE), NYHA CLASS IV, CHRONIC, SYSTOLIC: ICD-10-CM

## 2023-07-13 DIAGNOSIS — E11.51 TYPE 2 DIABETES MELLITUS WITH DIABETIC PERIPHERAL ANGIOPATHY WITHOUT GANGRENE, WITHOUT LONG-TERM CURRENT USE OF INSULIN: ICD-10-CM

## 2023-07-13 DIAGNOSIS — E21.5 PARATHYROID RELATED HYPERCALCEMIA: ICD-10-CM

## 2023-07-13 PROCEDURE — 3288F FALL RISK ASSESSMENT DOCD: CPT | Mod: CPTII,S$GLB,, | Performed by: PHYSICIAN ASSISTANT

## 2023-07-13 PROCEDURE — 1101F PR PT FALLS ASSESS DOC 0-1 FALLS W/OUT INJ PAST YR: ICD-10-PCS | Mod: CPTII,S$GLB,, | Performed by: PHYSICIAN ASSISTANT

## 2023-07-13 PROCEDURE — 1157F ADVNC CARE PLAN IN RCRD: CPT | Mod: CPTII,S$GLB,, | Performed by: PHYSICIAN ASSISTANT

## 2023-07-13 PROCEDURE — 1159F PR MEDICATION LIST DOCUMENTED IN MEDICAL RECORD: ICD-10-PCS | Mod: CPTII,S$GLB,, | Performed by: PHYSICIAN ASSISTANT

## 2023-07-13 PROCEDURE — 3288F PR FALLS RISK ASSESSMENT DOCUMENTED: ICD-10-PCS | Mod: CPTII,S$GLB,, | Performed by: PHYSICIAN ASSISTANT

## 2023-07-13 PROCEDURE — 1157F PR ADVANCE CARE PLAN OR EQUIV PRESENT IN MEDICAL RECORD: ICD-10-PCS | Mod: CPTII,S$GLB,, | Performed by: PHYSICIAN ASSISTANT

## 2023-07-13 PROCEDURE — 1101F PT FALLS ASSESS-DOCD LE1/YR: CPT | Mod: CPTII,S$GLB,, | Performed by: PHYSICIAN ASSISTANT

## 2023-07-13 PROCEDURE — 3008F PR BODY MASS INDEX (BMI) DOCUMENTED: ICD-10-PCS | Mod: CPTII,S$GLB,, | Performed by: PHYSICIAN ASSISTANT

## 2023-07-13 PROCEDURE — 3008F BODY MASS INDEX DOCD: CPT | Mod: CPTII,S$GLB,, | Performed by: PHYSICIAN ASSISTANT

## 2023-07-13 PROCEDURE — 4010F ACE/ARB THERAPY RXD/TAKEN: CPT | Mod: CPTII,S$GLB,, | Performed by: PHYSICIAN ASSISTANT

## 2023-07-13 PROCEDURE — 3044F HG A1C LEVEL LT 7.0%: CPT | Mod: CPTII,S$GLB,, | Performed by: PHYSICIAN ASSISTANT

## 2023-07-13 PROCEDURE — 1111F PR DISCHARGE MEDS RECONCILED W/ CURRENT OUTPATIENT MED LIST: ICD-10-PCS | Mod: CPTII,S$GLB,, | Performed by: PHYSICIAN ASSISTANT

## 2023-07-13 PROCEDURE — 99999 PR PBB SHADOW E&M-EST. PATIENT-LVL V: CPT | Mod: PBBFAC,,, | Performed by: PHYSICIAN ASSISTANT

## 2023-07-13 PROCEDURE — 1126F AMNT PAIN NOTED NONE PRSNT: CPT | Mod: CPTII,S$GLB,, | Performed by: PHYSICIAN ASSISTANT

## 2023-07-13 PROCEDURE — 99213 OFFICE O/P EST LOW 20 MIN: CPT | Mod: S$GLB,,, | Performed by: PHYSICIAN ASSISTANT

## 2023-07-13 PROCEDURE — 1159F MED LIST DOCD IN RCRD: CPT | Mod: CPTII,S$GLB,, | Performed by: PHYSICIAN ASSISTANT

## 2023-07-13 PROCEDURE — 1126F PR PAIN SEVERITY QUANTIFIED, NO PAIN PRESENT: ICD-10-PCS | Mod: CPTII,S$GLB,, | Performed by: PHYSICIAN ASSISTANT

## 2023-07-13 PROCEDURE — 3044F PR MOST RECENT HEMOGLOBIN A1C LEVEL <7.0%: ICD-10-PCS | Mod: CPTII,S$GLB,, | Performed by: PHYSICIAN ASSISTANT

## 2023-07-13 PROCEDURE — 99213 PR OFFICE/OUTPT VISIT, EST, LEVL III, 20-29 MIN: ICD-10-PCS | Mod: S$GLB,,, | Performed by: PHYSICIAN ASSISTANT

## 2023-07-13 PROCEDURE — 4010F PR ACE/ARB THEARPY RXD/TAKEN: ICD-10-PCS | Mod: CPTII,S$GLB,, | Performed by: PHYSICIAN ASSISTANT

## 2023-07-13 PROCEDURE — 99999 PR PBB SHADOW E&M-EST. PATIENT-LVL V: ICD-10-PCS | Mod: PBBFAC,,, | Performed by: PHYSICIAN ASSISTANT

## 2023-07-13 PROCEDURE — 1111F DSCHRG MED/CURRENT MED MERGE: CPT | Mod: CPTII,S$GLB,, | Performed by: PHYSICIAN ASSISTANT

## 2023-07-13 NOTE — TELEPHONE ENCOUNTER
----- Message from Sridhar Enriquez sent at 7/13/2023 12:00 PM CDT -----  Contact: pt  Type: Needs Medical Advice  Who Called:  pt  Best Call Back Number: [phone]    Additional Information: Pt is calling regarding appt on 7/20. Please call back and advise.

## 2023-07-13 NOTE — PROGRESS NOTES
Subjective:       Patient ID: Nelly Tian is a 72 y.o. female.    Chief Complaint: Pre-op Exam    Patient with CHF, Atrial fibrillation, GERD, HTN, Diabetes, AMY, MDD, PVD, mixed restrictive and obstructive lung disease and history of anterior abdominal wall cellulitis and bilateral groin yeast -with no active infection on suppressive antibiotic and antifungal per ID presents for medical optimization prior to cataract surgery.   Patients patient medical/surgical, social and family histories have been reviewed         Review of Systems   Constitutional:  Negative for fatigue and fever.   Respiratory:  Negative for cough, chest tightness and shortness of breath.    Cardiovascular:  Negative for chest pain, palpitations and leg swelling.   Genitourinary:  Negative for dysuria.   Neurological:  Negative for dizziness, light-headedness and headaches.     Objective:      Physical Exam  Constitutional:       General: She is not in acute distress.     Appearance: She is well-developed.   HENT:      Head: Normocephalic and atraumatic.   Cardiovascular:      Rate and Rhythm: Normal rate and regular rhythm.      Heart sounds: Normal heart sounds.   Pulmonary:      Effort: Pulmonary effort is normal.      Breath sounds: Normal breath sounds.   Musculoskeletal:      Right lower leg: No edema.      Left lower leg: No edema.   Skin:     General: Skin is warm and dry.   Neurological:      Mental Status: She is alert.   Psychiatric:         Behavior: Behavior is cooperative.       Assessment:       1. Pre-op evaluation    2. Parathyroid related hypercalcemia    3. Uncomplicated opioid dependence    4. Major depression, recurrent, chronic    5. Chronic obstructive pulmonary disease, unspecified COPD type    6. Cataract, unspecified cataract type, unspecified laterality    7. Type 2 diabetes mellitus with diabetic peripheral angiopathy without gangrene, without long-term current use of insulin    8. CHF (congestive heart  "failure), NYHA class IV, chronic, systolic    9. Chronic combined systolic and diastolic CHF (congestive heart failure)    10. Iron deficiency anemia due to sideropenic dysphagia        Plan:       Nelly was seen today for pre-op exam.    Diagnoses and all orders for this visit:    Pre-op evaluation    Parathyroid related hypercalcemia    Uncomplicated opioid dependence    Major depression, recurrent, chronic    Chronic obstructive pulmonary disease, unspecified COPD type    Cataract, unspecified cataract type, unspecified laterality    Type 2 diabetes mellitus with diabetic peripheral angiopathy without gangrene, without long-term current use of insulin    CHF (congestive heart failure), NYHA class IV, chronic, systolic    Chronic combined systolic and diastolic CHF (congestive heart failure)    Iron deficiency anemia due to sideropenic dysphagia     All chronic conditions are stable   High  risk patient is medically optimized for low risk procedure           Documentation entered by me for this encounter may have been done in part using speech-recognition technology. Although I have made an effort to ensure accuracy, "sound like" errors may exist and should be interpreted in context.    "

## 2023-07-20 ENCOUNTER — OFFICE VISIT (OUTPATIENT)
Dept: OPHTHALMOLOGY | Facility: CLINIC | Age: 73
End: 2023-07-20
Payer: MEDICARE

## 2023-07-20 DIAGNOSIS — H25.11 AGE-RELATED NUCLEAR CATARACT, RIGHT: Primary | ICD-10-CM

## 2023-07-20 PROCEDURE — 3288F PR FALLS RISK ASSESSMENT DOCUMENTED: ICD-10-PCS | Mod: CPTII,S$GLB,, | Performed by: OPHTHALMOLOGY

## 2023-07-20 PROCEDURE — 1160F RVW MEDS BY RX/DR IN RCRD: CPT | Mod: CPTII,S$GLB,, | Performed by: OPHTHALMOLOGY

## 2023-07-20 PROCEDURE — 1160F PR REVIEW ALL MEDS BY PRESCRIBER/CLIN PHARMACIST DOCUMENTED: ICD-10-PCS | Mod: CPTII,S$GLB,, | Performed by: OPHTHALMOLOGY

## 2023-07-20 PROCEDURE — 99214 PR OFFICE/OUTPT VISIT, EST, LEVL IV, 30-39 MIN: ICD-10-PCS | Mod: S$GLB,,, | Performed by: OPHTHALMOLOGY

## 2023-07-20 PROCEDURE — 3044F HG A1C LEVEL LT 7.0%: CPT | Mod: CPTII,S$GLB,, | Performed by: OPHTHALMOLOGY

## 2023-07-20 PROCEDURE — 99999 PR PBB SHADOW E&M-EST. PATIENT-LVL III: CPT | Mod: PBBFAC,,, | Performed by: OPHTHALMOLOGY

## 2023-07-20 PROCEDURE — 4010F ACE/ARB THERAPY RXD/TAKEN: CPT | Mod: CPTII,S$GLB,, | Performed by: OPHTHALMOLOGY

## 2023-07-20 PROCEDURE — 3288F FALL RISK ASSESSMENT DOCD: CPT | Mod: CPTII,S$GLB,, | Performed by: OPHTHALMOLOGY

## 2023-07-20 PROCEDURE — 1159F MED LIST DOCD IN RCRD: CPT | Mod: CPTII,S$GLB,, | Performed by: OPHTHALMOLOGY

## 2023-07-20 PROCEDURE — 1101F PR PT FALLS ASSESS DOC 0-1 FALLS W/OUT INJ PAST YR: ICD-10-PCS | Mod: CPTII,S$GLB,, | Performed by: OPHTHALMOLOGY

## 2023-07-20 PROCEDURE — 4010F PR ACE/ARB THEARPY RXD/TAKEN: ICD-10-PCS | Mod: CPTII,S$GLB,, | Performed by: OPHTHALMOLOGY

## 2023-07-20 PROCEDURE — 1101F PT FALLS ASSESS-DOCD LE1/YR: CPT | Mod: CPTII,S$GLB,, | Performed by: OPHTHALMOLOGY

## 2023-07-20 PROCEDURE — 1111F DSCHRG MED/CURRENT MED MERGE: CPT | Mod: CPTII,S$GLB,, | Performed by: OPHTHALMOLOGY

## 2023-07-20 PROCEDURE — 99214 OFFICE O/P EST MOD 30 MIN: CPT | Mod: S$GLB,,, | Performed by: OPHTHALMOLOGY

## 2023-07-20 PROCEDURE — 3044F PR MOST RECENT HEMOGLOBIN A1C LEVEL <7.0%: ICD-10-PCS | Mod: CPTII,S$GLB,, | Performed by: OPHTHALMOLOGY

## 2023-07-20 PROCEDURE — 1159F PR MEDICATION LIST DOCUMENTED IN MEDICAL RECORD: ICD-10-PCS | Mod: CPTII,S$GLB,, | Performed by: OPHTHALMOLOGY

## 2023-07-20 PROCEDURE — 1111F PR DISCHARGE MEDS RECONCILED W/ CURRENT OUTPATIENT MED LIST: ICD-10-PCS | Mod: CPTII,S$GLB,, | Performed by: OPHTHALMOLOGY

## 2023-07-20 PROCEDURE — 92136 IOL MASTER - OD - RIGHT EYE: ICD-10-PCS | Mod: RT,S$GLB,, | Performed by: OPHTHALMOLOGY

## 2023-07-20 PROCEDURE — 1157F PR ADVANCE CARE PLAN OR EQUIV PRESENT IN MEDICAL RECORD: ICD-10-PCS | Mod: CPTII,S$GLB,, | Performed by: OPHTHALMOLOGY

## 2023-07-20 PROCEDURE — 1157F ADVNC CARE PLAN IN RCRD: CPT | Mod: CPTII,S$GLB,, | Performed by: OPHTHALMOLOGY

## 2023-07-20 PROCEDURE — 99999 PR PBB SHADOW E&M-EST. PATIENT-LVL III: ICD-10-PCS | Mod: PBBFAC,,, | Performed by: OPHTHALMOLOGY

## 2023-07-20 PROCEDURE — 92136 OPHTHALMIC BIOMETRY: CPT | Mod: RT,S$GLB,, | Performed by: OPHTHALMOLOGY

## 2023-07-20 RX ORDER — TROPICAMIDE 10 MG/ML
1 SOLUTION/ DROPS OPHTHALMIC
Status: CANCELLED | OUTPATIENT
Start: 2023-07-20

## 2023-07-20 RX ORDER — PROPARACAINE HYDROCHLORIDE 5 MG/ML
1 SOLUTION/ DROPS OPHTHALMIC
Status: CANCELLED | OUTPATIENT
Start: 2023-07-20

## 2023-07-20 RX ORDER — MOXIFLOXACIN 5 MG/ML
1 SOLUTION/ DROPS OPHTHALMIC 4 TIMES DAILY
Qty: 3 ML | Refills: 0 | Status: SHIPPED | OUTPATIENT
Start: 2023-07-20 | End: 2023-08-10 | Stop reason: ALTCHOICE

## 2023-07-20 RX ORDER — PHENYLEPHRINE HYDROCHLORIDE 25 MG/ML
1 SOLUTION/ DROPS OPHTHALMIC
Status: CANCELLED | OUTPATIENT
Start: 2023-07-20

## 2023-07-20 RX ORDER — KETOROLAC TROMETHAMINE 5 MG/ML
1 SOLUTION OPHTHALMIC 4 TIMES DAILY
Qty: 5 ML | Refills: 2 | Status: SHIPPED | OUTPATIENT
Start: 2023-07-20 | End: 2023-12-21 | Stop reason: ALTCHOICE

## 2023-07-20 RX ORDER — PREDNISOLONE ACETATE 10 MG/ML
1 SUSPENSION/ DROPS OPHTHALMIC 4 TIMES DAILY
Qty: 5 ML | Refills: 2 | Status: SHIPPED | OUTPATIENT
Start: 2023-07-20 | End: 2023-12-21 | Stop reason: ALTCHOICE

## 2023-07-20 RX ORDER — SODIUM CHLORIDE 9 MG/ML
INJECTION, SOLUTION INTRAVENOUS CONTINUOUS
Status: CANCELLED | OUTPATIENT
Start: 2023-07-20

## 2023-07-20 RX ORDER — PHENYLEPHRINE HYDROCHLORIDE 100 MG/ML
1 SOLUTION/ DROPS OPHTHALMIC
Status: CANCELLED | OUTPATIENT
Start: 2023-07-20

## 2023-07-20 NOTE — PROGRESS NOTES
HPI    Pt presents for pre op PCIOL OD     States blurred vision OD- trouble driving at night     Last edited by Brittany Jones on 7/20/2023 11:22 AM.            Assessment /Plan     For exam results, see Encounter Report.    Age-related nuclear cataract, right      Patient with visually significant cataract impacting abiliity ADLs (reading, driving, PM driving, glare).  Discussed options, R & B, expectations, patient voices good understanding and wishes to proceed with procedure. Patient will likely benefit from sx and signed consent.    Proceed with CEIOL OD  Yellow IOL distance monofocal

## 2023-07-26 NOTE — TELEPHONE ENCOUNTER
Spoke with patient care giver (Latoya) she stated the patient did not need an appointment with us for clearance

## 2023-07-27 ENCOUNTER — TELEPHONE (OUTPATIENT)
Dept: OPHTHALMOLOGY | Facility: CLINIC | Age: 73
End: 2023-07-27
Payer: MEDICARE

## 2023-07-27 NOTE — TELEPHONE ENCOUNTER
Spoke to pt and made aware surgery center will call the day before with arrival time         ----- Message from Kia Diamond sent at 7/27/2023 10:11 AM CDT -----  Contact: Patient  Type: Needs Medical Advice  Who Called:  Patient  She wants to know the time of her cataract surgery on 8/2/23.  Best Call Back Number: [phone]  Additional Information: Please call th e pt back to advise. Thanks!

## 2023-07-30 DIAGNOSIS — M51.36 LUMBAR DEGENERATIVE DISC DISEASE: ICD-10-CM

## 2023-07-30 DIAGNOSIS — I50.22 CHRONIC SYSTOLIC CONGESTIVE HEART FAILURE: ICD-10-CM

## 2023-07-30 NOTE — TELEPHONE ENCOUNTER
Care Due:                  Date            Visit Type   Department     Provider  --------------------------------------------------------------------------------                                EP -                              PRIMARY      [facility]  Last Visit: 04-      CARE ([facility])   ONOFRE Bird                              EP -                              PRIMARY      [facility]  Next Visit: 11-      CARE ([facility])   ONOFRE Bird                                                            Last  Test          Frequency    Reason                     Performed    Due Date  --------------------------------------------------------------------------------    HBA1C.......  6 months...  empagliflozin, metFORMIN.  04-   10-    Lipid Panel.  12 months..  atorvastatin.............  03- 03-    [facility] Embedded Care Due Messages. Reference number: [id number].   7/30/2023 11:10:54 AM CDT

## 2023-07-31 DIAGNOSIS — M51.36 LUMBAR DEGENERATIVE DISC DISEASE: ICD-10-CM

## 2023-07-31 DIAGNOSIS — Z51.81 THERAPEUTIC DRUG MONITORING: ICD-10-CM

## 2023-07-31 RX ORDER — FUROSEMIDE 40 MG/1
TABLET ORAL
Qty: 6 TABLET | Refills: 0 | Status: SHIPPED | OUTPATIENT
Start: 2023-07-31 | End: 2023-09-05

## 2023-07-31 RX ORDER — NYSTATIN AND TRIAMCINOLONE ACETONIDE 100000; 1 [USP'U]/G; MG/G
CREAM TOPICAL 3 TIMES DAILY
Qty: 30 G | Refills: 1 | Status: SHIPPED | OUTPATIENT
Start: 2023-07-31 | End: 2023-12-26 | Stop reason: SDUPTHER

## 2023-07-31 RX ORDER — HYDROCODONE BITARTRATE AND ACETAMINOPHEN 7.5; 325 MG/1; MG/1
1 TABLET ORAL EVERY 12 HOURS PRN
Qty: 60 TABLET | Refills: 0 | Status: SHIPPED | OUTPATIENT
Start: 2023-07-31 | End: 2023-08-30 | Stop reason: SDUPTHER

## 2023-07-31 RX ORDER — LIDOCAINE 50 MG/G
PATCH TOPICAL
Qty: 30 PATCH | Refills: 3 | Status: SHIPPED | OUTPATIENT
Start: 2023-07-31

## 2023-07-31 NOTE — TELEPHONE ENCOUNTER
Refill Routing Note   Medication(s) are not appropriate for processing by [facility] for the following reason(s):      Medication outside of protocol  Unclear if pt should be on furosemide long term    [facility] action(s):  Defer  Route Care Due:  Labs due     Medication Therapy Plan: Unclear if pt should be on furosemide long term      Appointments  past 12m or future 3m with PCP    Date Provider   Last Visit   4/18/2023 Constantine Bird MD   Next Visit   7/31/2023 Constantine Bird MD   ED visits in past 90 days: 0        Note composed:12:43 PM 07/31/2023

## 2023-07-31 NOTE — TELEPHONE ENCOUNTER
No care due was identified.  Health [facility] Embedded Care Due Messages. Reference number: [id number].   7/31/2023 7:53:18 AM CDT

## 2023-08-02 ENCOUNTER — HOSPITAL ENCOUNTER (OUTPATIENT)
Facility: HOSPITAL | Age: 73
Discharge: HOME | End: 2023-08-02
Attending: OPHTHALMOLOGY | Admitting: OPHTHALMOLOGY
Payer: MEDICARE

## 2023-08-02 ENCOUNTER — ANESTHESIA EVENT (OUTPATIENT)
Dept: SURGERY | Facility: HOSPITAL | Age: 73
End: 2023-08-02
Payer: MEDICARE

## 2023-08-02 ENCOUNTER — ANESTHESIA (OUTPATIENT)
Dept: SURGERY | Facility: HOSPITAL | Age: 73
End: 2023-08-02
Payer: MEDICARE

## 2023-08-02 DIAGNOSIS — H25.11 AGE-RELATED NUCLEAR CATARACT, RIGHT: ICD-10-CM

## 2023-08-02 LAB — POCT GLUCOSE: 78 MG/DL (ref 70–110)

## 2023-08-02 PROCEDURE — 36000706: Performed by: OPHTHALMOLOGY

## 2023-08-02 PROCEDURE — 66984 PR REMOVAL, CATARACT, W/INSRT INTRAOC LENS, W/O ENDO CYCLO: ICD-10-PCS | Mod: RT,,, | Performed by: OPHTHALMOLOGY

## 2023-08-02 PROCEDURE — V2632 POST CHMBR INTRAOCULAR LENS: HCPCS | Performed by: OPHTHALMOLOGY

## 2023-08-02 PROCEDURE — 63600175 PHARM REV CODE 636 W HCPCS: Performed by: NURSE ANESTHETIST, CERTIFIED REGISTERED

## 2023-08-02 PROCEDURE — 63600175 PHARM REV CODE 636 W HCPCS: Performed by: OPHTHALMOLOGY

## 2023-08-02 PROCEDURE — 63600175 PHARM REV CODE 636 W HCPCS: Performed by: ANESTHESIOLOGY

## 2023-08-02 PROCEDURE — 25000003 PHARM REV CODE 250: Performed by: OPHTHALMOLOGY

## 2023-08-02 PROCEDURE — D9220A PRA ANESTHESIA: ICD-10-PCS | Mod: ANES,,, | Performed by: ANESTHESIOLOGY

## 2023-08-02 PROCEDURE — 36000707: Performed by: OPHTHALMOLOGY

## 2023-08-02 PROCEDURE — 37000008 HC ANESTHESIA 1ST 15 MINUTES: Performed by: OPHTHALMOLOGY

## 2023-08-02 PROCEDURE — 66984 XCAPSL CTRC RMVL W/O ECP: CPT | Mod: RT,,, | Performed by: OPHTHALMOLOGY

## 2023-08-02 PROCEDURE — D9220A PRA ANESTHESIA: Mod: CRNA,,, | Performed by: NURSE ANESTHETIST, CERTIFIED REGISTERED

## 2023-08-02 PROCEDURE — D9220A PRA ANESTHESIA: ICD-10-PCS | Mod: CRNA,,, | Performed by: NURSE ANESTHETIST, CERTIFIED REGISTERED

## 2023-08-02 PROCEDURE — 71000033 HC RECOVERY, INTIAL HOUR: Performed by: OPHTHALMOLOGY

## 2023-08-02 PROCEDURE — D9220A PRA ANESTHESIA: Mod: ANES,,, | Performed by: ANESTHESIOLOGY

## 2023-08-02 PROCEDURE — 37000009 HC ANESTHESIA EA ADD 15 MINS: Performed by: OPHTHALMOLOGY

## 2023-08-02 DEVICE — IMPLANTABLE DEVICE: Type: IMPLANTABLE DEVICE | Site: EYE | Status: FUNCTIONAL

## 2023-08-02 RX ORDER — PHENYLEPHRINE HYDROCHLORIDE 25 MG/ML
1 SOLUTION/ DROPS OPHTHALMIC
Status: DISCONTINUED | OUTPATIENT
Start: 2023-08-02 | End: 2023-08-02 | Stop reason: HOSPADM

## 2023-08-02 RX ORDER — PHENYLEPHRINE HYDROCHLORIDE 100 MG/ML
1 SOLUTION/ DROPS OPHTHALMIC
Status: DISCONTINUED | OUTPATIENT
Start: 2023-08-02 | End: 2023-08-02 | Stop reason: HOSPADM

## 2023-08-02 RX ORDER — EPINEPHRINE 1 MG/ML
INJECTION, SOLUTION, CONCENTRATE INTRAVENOUS
Status: DISPENSED
Start: 2023-08-02 | End: 2023-08-03

## 2023-08-02 RX ORDER — SODIUM CHLORIDE 9 MG/ML
INJECTION, SOLUTION INTRAVENOUS CONTINUOUS
Status: DISCONTINUED | OUTPATIENT
Start: 2023-08-02 | End: 2023-08-02 | Stop reason: HOSPADM

## 2023-08-02 RX ORDER — ONDANSETRON 2 MG/ML
INJECTION INTRAMUSCULAR; INTRAVENOUS
Status: DISCONTINUED | OUTPATIENT
Start: 2023-08-02 | End: 2023-08-02

## 2023-08-02 RX ORDER — TROPICAMIDE 10 MG/ML
1 SOLUTION/ DROPS OPHTHALMIC
Status: DISCONTINUED | OUTPATIENT
Start: 2023-08-02 | End: 2023-08-02 | Stop reason: HOSPADM

## 2023-08-02 RX ORDER — MOXIFLOXACIN 5 MG/ML
SOLUTION/ DROPS OPHTHALMIC
Status: DISCONTINUED | OUTPATIENT
Start: 2023-08-02 | End: 2023-08-02 | Stop reason: HOSPADM

## 2023-08-02 RX ORDER — LIDOCAINE HYDROCHLORIDE 40 MG/ML
INJECTION, SOLUTION RETROBULBAR
Status: DISCONTINUED | OUTPATIENT
Start: 2023-08-02 | End: 2023-08-02 | Stop reason: HOSPADM

## 2023-08-02 RX ORDER — MIDAZOLAM HYDROCHLORIDE 1 MG/ML
INJECTION, SOLUTION INTRAMUSCULAR; INTRAVENOUS
Status: DISCONTINUED | OUTPATIENT
Start: 2023-08-02 | End: 2023-08-02

## 2023-08-02 RX ORDER — EPINEPHRINE 1 MG/ML
INJECTION, SOLUTION, CONCENTRATE INTRAVENOUS
Status: DISCONTINUED | OUTPATIENT
Start: 2023-08-02 | End: 2023-08-02 | Stop reason: HOSPADM

## 2023-08-02 RX ORDER — SODIUM CHLORIDE, SODIUM LACTATE, POTASSIUM CHLORIDE, CALCIUM CHLORIDE 600; 310; 30; 20 MG/100ML; MG/100ML; MG/100ML; MG/100ML
INJECTION, SOLUTION INTRAVENOUS CONTINUOUS
Status: DISCONTINUED | OUTPATIENT
Start: 2023-08-02 | End: 2023-08-02 | Stop reason: HOSPADM

## 2023-08-02 RX ORDER — ACETAMINOPHEN 325 MG/1
650 TABLET ORAL EVERY 6 HOURS PRN
Status: DISCONTINUED | OUTPATIENT
Start: 2023-08-02 | End: 2023-08-02 | Stop reason: HOSPADM

## 2023-08-02 RX ORDER — PROPARACAINE HYDROCHLORIDE 5 MG/ML
1 SOLUTION/ DROPS OPHTHALMIC
Status: DISCONTINUED | OUTPATIENT
Start: 2023-08-02 | End: 2023-08-02 | Stop reason: HOSPADM

## 2023-08-02 RX ADMIN — PROPARACAINE HYDROCHLORIDE 1 DROP: 5 SOLUTION/ DROPS OPHTHALMIC at 07:08

## 2023-08-02 RX ADMIN — TROPICAMIDE 1 DROP: 10 SOLUTION/ DROPS OPHTHALMIC at 07:08

## 2023-08-02 RX ADMIN — MIDAZOLAM 2 MG: 1 INJECTION INTRAMUSCULAR; INTRAVENOUS at 08:08

## 2023-08-02 RX ADMIN — SODIUM CHLORIDE, POTASSIUM CHLORIDE, SODIUM LACTATE AND CALCIUM CHLORIDE: 600; 310; 30; 20 INJECTION, SOLUTION INTRAVENOUS at 07:08

## 2023-08-02 RX ADMIN — PHENYLEPHRINE HYDROCHLORIDE 1 DROP: 25 SOLUTION/ DROPS OPHTHALMIC at 07:08

## 2023-08-02 RX ADMIN — ONDANSETRON 4 MG: 2 INJECTION, SOLUTION INTRAMUSCULAR; INTRAVENOUS at 08:08

## 2023-08-02 RX ADMIN — ACETAMINOPHEN 650 MG: 325 TABLET, FILM COATED ORAL at 09:08

## 2023-08-02 NOTE — TRANSFER OF CARE
"Anesthesia Transfer of Care Note    Patient: Nelly Tian    Procedure(s) Performed: Procedure(s) (LRB):  CEIOL OD (Right)    Patient location: PACU    Anesthesia Type: MAC    Transport from OR: Transported from OR on room air with adequate spontaneous ventilation    Post pain: adequate analgesia    Post assessment: no apparent anesthetic complications    Post vital signs: stable    Level of consciousness: awake    Nausea/Vomiting: no nausea/vomiting    Complications: none    Transfer of care protocol was followed      Last vitals:   Visit Vitals  /61   Pulse 68   Temp 37.2 °C (98.9 °F) (Skin)   Resp 18   Ht 5' 8" (1.727 m)   Wt 115.7 kg (255 lb)   LMP  (LMP Unknown)   SpO2 (!) 91%   Breastfeeding No   BMI 38.77 kg/m²     "

## 2023-08-02 NOTE — DISCHARGE SUMMARY
[facility] - Periop Services  Discharge Note  Short Stay    Procedure(s) (LRB):  CEIOL OD (Right)      OUTCOME: Patient tolerated treatment/procedure well without complication and is now ready for discharge.    DISPOSITION: Home or Self Care    FINAL DIAGNOSIS:  cataract    FOLLOWUP: In clinic    DISCHARGE INSTRUCTIONS:  No discharge procedures on file.     TIME SPENT ON DISCHARGE: 5 minutes

## 2023-08-02 NOTE — PLAN OF CARE
Stable, states ready to go home, nick po fluids, pain tolerable, New cap placed on Ketorolac drops as pt had lost its cap.  OK per Dr Bautista. Cap is Red instead of melendez.

## 2023-08-02 NOTE — H&P
History    Chief complaint:  Painless progressive vision loss right Eye    Present Ilness/Diagnosis: Visually significant cataract, right Eye    ROS: +Eyes, otherwise no significant changes    Past Medical History: refer to chart    Family History/Social History: refer to chart    Allergies:   Review of patient's allergies indicates:   Allergen Reactions    Dilaudid [hydromorphone] Anaphylaxis     Other reaction(s): Anaphylaxis  Other reaction(s): Unknown    Zyvox [linezolid in dextrose 5%] Shortness Of Breath       Current Medications: see medcard      Physical Exam    BP: Vital signs stable  General: No apparent distress  HEENT: cataract  Lungs: adequate respirations  Heart: + pulses  Abdomen: soft  Rectal/pelvic: deferred    Labs: Labs Reviewed    Lab Results   Component Value Date    WBC 7.72 06/26/2023    HGB 12.8 06/26/2023    HCT 40.8 06/26/2023    MCV 94 06/26/2023     06/26/2023           CMP  Sodium   Date Value Ref Range Status   06/26/2023 138 136 - 145 mmol/L Final     Potassium   Date Value Ref Range Status   06/26/2023 4.7 3.5 - 5.1 mmol/L Final     Chloride   Date Value Ref Range Status   06/26/2023 100 95 - 110 mmol/L Final     CO2   Date Value Ref Range Status   06/26/2023 29 23 - 29 mmol/L Final     Glucose   Date Value Ref Range Status   06/26/2023 97 70 - 110 mg/dL Final     BUN   Date Value Ref Range Status   06/26/2023 15 8 - 23 mg/dL Final     Creatinine   Date Value Ref Range Status   06/26/2023 0.6 0.5 - 1.4 mg/dL Final     Calcium   Date Value Ref Range Status   06/26/2023 9.2 8.7 - 10.5 mg/dL Final     Total Protein   Date Value Ref Range Status   06/26/2023 6.6 6.0 - 8.4 g/dL Final     Albumin   Date Value Ref Range Status   06/26/2023 3.1 (L) 3.5 - 5.2 g/dL Final     Total Bilirubin   Date Value Ref Range Status   06/26/2023 0.5 0.1 - 1.0 mg/dL Final     Comment:     For infants and newborns, interpretation of results should be based  on gestational age, weight and in agreement  with clinical  observations.    Premature Infant recommended reference ranges:  Up to 24 hours.............<8.0 mg/dL  Up to 48 hours............<12.0 mg/dL  3-5 days..................<15.0 mg/dL  6-29 days.................<15.0 mg/dL       Alkaline Phosphatase   Date Value Ref Range Status   06/26/2023 65 55 - 135 U/L Final     AST   Date Value Ref Range Status   06/26/2023 27 10 - 40 U/L Final     ALT   Date Value Ref Range Status   06/26/2023 24 10 - 44 U/L Final     Anion Gap   Date Value Ref Range Status   06/26/2023 9 8 - 16 mmol/L Final     eGFR   Date Value Ref Range Status   06/26/2023 >60 >60 mL/min/1.73 m^2 Final       The patient has been cleared for surgery in an ambulatory surgery facility.     Impression: Visually significant Cataract right Eye    Plan: Phacoemulsification with implantation of Intraocular lens right Eye

## 2023-08-02 NOTE — ANESTHESIA POSTPROCEDURE EVALUATION
Anesthesia Post Evaluation    Patient: Nelly Tian    Procedure(s) Performed: Procedure(s) (LRB):  CEIOL OD (Right)    Final Anesthesia Type: MAC      Patient location during evaluation: PACU  Patient participation: Yes- Able to Participate  Level of consciousness: awake and alert and oriented  Post-procedure vital signs: reviewed and stable  Pain management: adequate  Airway patency: patent    PONV status at discharge: No PONV  Anesthetic complications: no      Cardiovascular status: blood pressure returned to baseline and stable  Respiratory status: unassisted and spontaneous ventilation  Hydration status: euvolemic  Follow-up not needed.          Vitals Value Taken Time   /7 08/02/23 0945   Temp   08/02/23 1035   Pulse 62 08/02/23 1000   Resp 20 08/02/23 0945   SpO2 97 % 08/02/23 0947   Vitals shown include unvalidated device data.      Event Time   Out of Recovery 10:00:00         Pain/Josh Score: Pain Rating Prior to Med Admin: 5 (8/2/2023  9:42 AM)  Pain Rating Post Med Admin: 3 (8/2/2023  9:55 AM)  Josh Score: 10 (8/2/2023  9:55 AM)

## 2023-08-02 NOTE — OP NOTE
Operative Date:  08/02/2023    Discharge Date:  08/02/2023    Report Title: Operative Note    SURGEON: David Bautista MD    ASSISTANT: None    PREOPERATIVE DIAGNOSIS: Age-related nuclear cataract, Right Eye    POSTOPERATIVE DIAGNOSIS: same    PROCEDURE PERFORMED: Phacoemulsification of the cataract with posterior chamber intraocular lens Right Eye    IMPLANTS: SY60WF 17.5    ANESTHESIA:  Topical with MAC    COMPLICATIONS: None    ESTIMATED BLOOD LOSS: Minimal    PROCEDURE: The patient was brought to the operating room, time out was performed and implant checked.  The patient was given light sedation, and topical anesthesia was instilled in the right eye.  The right eye was prepped and draped in the usual fashion for eye surgery and lid speculum used to retract the eyelid. The eyelashes were secluded within the drape.  A paracentesis was made superiorly with a sideport blade. Epishugarcaine was injected in the anterior chamber and dispersive viscoelastic was injected into the anterior chamber. A temporal corneal incision was made with a steel keratome. A cystitome was used to initiate a continuous curvilinear capsulorrhexis and completed using the capsulorrhexis forceps. Hydrodissection of the lens nucleus was performed using balanced salt solution (BSS) on hydrodissection cannula. The lens nucleus was removed using phacoemulsification in the modified stop and chop technique. The lens cortex was removed using the irrigation/aspiration handpiece. The capsular bag was filled with viscoelastic, and the intraocular lens was injected into the capsular bag under direct visualization. Viscoelastic was removed using the irrigation/aspiration handpiece. The wounds were hydrated until watertight.    The wounds were rechecked and no leakage was noted.  The speculum was removed. Topical antibiotic was applied to the eye and shield was placed over the eye. The patient tolerated the procedure well and left the operating room in  good condition.

## 2023-08-02 NOTE — ANESTHESIA PREPROCEDURE EVALUATION
08/02/2023  Nelly Tian is a 72 y.o., female.      Pre-op Assessment    I have reviewed the Patient Summary Reports.     I have reviewed the Nursing Notes. I have reviewed the NPO Status.   I have reviewed the Medications.     Review of Systems  Anesthesia Hx:  No problems with previous Anesthesia    Social:  Former Smoker    Cardiovascular:   Hypertension, well controlled Past MI Dysrhythmias (A flutter)  Angina CHF PVD hyperlipidemia    Pulmonary:   Pneumonia COPD, moderate Asthma    Renal/:  Renal/ Normal     Hepatic/GI:   GERD, well controlled Liver Disease,    Musculoskeletal:   Arthritis   Spine Disorders: lumbar    Neurological:   Neuromuscular Disease,   Peripheral Neuropathy    Endocrine:   Diabetes, well controlled, type 2  Obesity / BMI > 30, Morbid Obesity / BMI > 40  Psych:   Psychiatric History          Physical Exam  General: Well nourished, Cooperative, Alert and Oriented    Airway:  Mallampati: II   Mouth Opening: Normal  TM Distance: Normal  Neck ROM: Normal ROM        Anesthesia Plan  Type of Anesthesia, risks & benefits discussed:    Anesthesia Type: Gen ETT, Gen Supraglottic Airway, Gen Natural Airway, MAC  Intra-op Monitoring Plan: Standard ASA Monitors  Post Op Pain Control Plan: multimodal analgesia  Induction:  IV  Airway Plan: Direct, Video and Fiberoptic, Post-Induction  Informed Consent: Informed consent signed with the Patient and all parties understand the risks and agree with anesthesia plan.  All questions answered.   ASA Score: 3    Ready For Surgery From Anesthesia Perspective.     .

## 2023-08-03 ENCOUNTER — OFFICE VISIT (OUTPATIENT)
Dept: OPHTHALMOLOGY | Facility: CLINIC | Age: 73
End: 2023-08-03
Payer: MEDICARE

## 2023-08-03 VITALS
OXYGEN SATURATION: 95 % | RESPIRATION RATE: 20 BRPM | BODY MASS INDEX: 38.65 KG/M2 | WEIGHT: 255 LBS | HEART RATE: 62 BPM | HEIGHT: 68 IN | DIASTOLIC BLOOD PRESSURE: 7 MMHG | SYSTOLIC BLOOD PRESSURE: 152 MMHG | TEMPERATURE: 99 F

## 2023-08-03 DIAGNOSIS — Z98.41 STATUS POST CATARACT SURGERY, RIGHT: Primary | ICD-10-CM

## 2023-08-03 PROCEDURE — 3044F PR MOST RECENT HEMOGLOBIN A1C LEVEL <7.0%: ICD-10-PCS | Mod: CPTII,S$GLB,, | Performed by: OPHTHALMOLOGY

## 2023-08-03 PROCEDURE — 1126F PR PAIN SEVERITY QUANTIFIED, NO PAIN PRESENT: ICD-10-PCS | Mod: CPTII,S$GLB,, | Performed by: OPHTHALMOLOGY

## 2023-08-03 PROCEDURE — 1157F ADVNC CARE PLAN IN RCRD: CPT | Mod: CPTII,S$GLB,, | Performed by: OPHTHALMOLOGY

## 2023-08-03 PROCEDURE — 4010F ACE/ARB THERAPY RXD/TAKEN: CPT | Mod: CPTII,S$GLB,, | Performed by: OPHTHALMOLOGY

## 2023-08-03 PROCEDURE — 1101F PT FALLS ASSESS-DOCD LE1/YR: CPT | Mod: CPTII,S$GLB,, | Performed by: OPHTHALMOLOGY

## 2023-08-03 PROCEDURE — 99999 PR PBB SHADOW E&M-EST. PATIENT-LVL III: CPT | Mod: PBBFAC,,, | Performed by: OPHTHALMOLOGY

## 2023-08-03 PROCEDURE — 1159F PR MEDICATION LIST DOCUMENTED IN MEDICAL RECORD: ICD-10-PCS | Mod: CPTII,S$GLB,, | Performed by: OPHTHALMOLOGY

## 2023-08-03 PROCEDURE — 99024 PR POST-OP FOLLOW-UP VISIT: ICD-10-PCS | Mod: S$GLB,,, | Performed by: OPHTHALMOLOGY

## 2023-08-03 PROCEDURE — 1157F PR ADVANCE CARE PLAN OR EQUIV PRESENT IN MEDICAL RECORD: ICD-10-PCS | Mod: CPTII,S$GLB,, | Performed by: OPHTHALMOLOGY

## 2023-08-03 PROCEDURE — 1126F AMNT PAIN NOTED NONE PRSNT: CPT | Mod: CPTII,S$GLB,, | Performed by: OPHTHALMOLOGY

## 2023-08-03 PROCEDURE — 99999 PR PBB SHADOW E&M-EST. PATIENT-LVL III: ICD-10-PCS | Mod: PBBFAC,,, | Performed by: OPHTHALMOLOGY

## 2023-08-03 PROCEDURE — 1160F RVW MEDS BY RX/DR IN RCRD: CPT | Mod: CPTII,S$GLB,, | Performed by: OPHTHALMOLOGY

## 2023-08-03 PROCEDURE — 1160F PR REVIEW ALL MEDS BY PRESCRIBER/CLIN PHARMACIST DOCUMENTED: ICD-10-PCS | Mod: CPTII,S$GLB,, | Performed by: OPHTHALMOLOGY

## 2023-08-03 PROCEDURE — 3288F PR FALLS RISK ASSESSMENT DOCUMENTED: ICD-10-PCS | Mod: CPTII,S$GLB,, | Performed by: OPHTHALMOLOGY

## 2023-08-03 PROCEDURE — 1159F MED LIST DOCD IN RCRD: CPT | Mod: CPTII,S$GLB,, | Performed by: OPHTHALMOLOGY

## 2023-08-03 PROCEDURE — 1101F PR PT FALLS ASSESS DOC 0-1 FALLS W/OUT INJ PAST YR: ICD-10-PCS | Mod: CPTII,S$GLB,, | Performed by: OPHTHALMOLOGY

## 2023-08-03 PROCEDURE — 3288F FALL RISK ASSESSMENT DOCD: CPT | Mod: CPTII,S$GLB,, | Performed by: OPHTHALMOLOGY

## 2023-08-03 PROCEDURE — 4010F PR ACE/ARB THEARPY RXD/TAKEN: ICD-10-PCS | Mod: CPTII,S$GLB,, | Performed by: OPHTHALMOLOGY

## 2023-08-03 PROCEDURE — 99024 POSTOP FOLLOW-UP VISIT: CPT | Mod: S$GLB,,, | Performed by: OPHTHALMOLOGY

## 2023-08-03 PROCEDURE — 3044F HG A1C LEVEL LT 7.0%: CPT | Mod: CPTII,S$GLB,, | Performed by: OPHTHALMOLOGY

## 2023-08-07 NOTE — PLAN OF CARE
[birth year] 2005   Patient Assessment/Suction   Level of Consciousness (AVPU) alert   Respiratory Effort Unlabored   Expansion/Accessory Muscles/Retractions no use of accessory muscles   All Lung Fields Breath Sounds diminished;clear   Rhythm/Pattern, Respiratory unlabored   Cough Frequency infrequent   Cough Type nonproductive   PRE-TX-O2   O2 Device (Oxygen Therapy) nasal cannula   Flow (L/min) 2   Oxygen Concentration (%) 28   SpO2 97 %   Pulse Oximetry Type Intermittent   $ Pulse Oximetry - Multiple Charge Pulse Oximetry - Multiple   Aerosol Therapy   $ Aerosol Therapy Charges PRN treatment not required   Ready to Wean/Extubation Screen   FIO2<=50 (chart decimal) 0.28      Bactrim Pregnancy And Lactation Text: This medication is Pregnancy Category D and is known to cause fetal risk.  It is also excreted in breast milk.

## 2023-08-10 ENCOUNTER — OFFICE VISIT (OUTPATIENT)
Dept: OPHTHALMOLOGY | Facility: CLINIC | Age: 73
End: 2023-08-10
Payer: MEDICARE

## 2023-08-10 DIAGNOSIS — Z98.41 STATUS POST CATARACT SURGERY, RIGHT: Primary | ICD-10-CM

## 2023-08-10 PROCEDURE — 1159F PR MEDICATION LIST DOCUMENTED IN MEDICAL RECORD: ICD-10-PCS | Mod: CPTII,S$GLB,, | Performed by: OPHTHALMOLOGY

## 2023-08-10 PROCEDURE — 3288F PR FALLS RISK ASSESSMENT DOCUMENTED: ICD-10-PCS | Mod: CPTII,S$GLB,, | Performed by: OPHTHALMOLOGY

## 2023-08-10 PROCEDURE — 99999 PR PBB SHADOW E&M-EST. PATIENT-LVL III: ICD-10-PCS | Mod: PBBFAC,,, | Performed by: OPHTHALMOLOGY

## 2023-08-10 PROCEDURE — 3044F HG A1C LEVEL LT 7.0%: CPT | Mod: CPTII,S$GLB,, | Performed by: OPHTHALMOLOGY

## 2023-08-10 PROCEDURE — 1160F PR REVIEW ALL MEDS BY PRESCRIBER/CLIN PHARMACIST DOCUMENTED: ICD-10-PCS | Mod: CPTII,S$GLB,, | Performed by: OPHTHALMOLOGY

## 2023-08-10 PROCEDURE — 1126F PR PAIN SEVERITY QUANTIFIED, NO PAIN PRESENT: ICD-10-PCS | Mod: CPTII,S$GLB,, | Performed by: OPHTHALMOLOGY

## 2023-08-10 PROCEDURE — 1101F PT FALLS ASSESS-DOCD LE1/YR: CPT | Mod: CPTII,S$GLB,, | Performed by: OPHTHALMOLOGY

## 2023-08-10 PROCEDURE — 1126F AMNT PAIN NOTED NONE PRSNT: CPT | Mod: CPTII,S$GLB,, | Performed by: OPHTHALMOLOGY

## 2023-08-10 PROCEDURE — 99999 PR PBB SHADOW E&M-EST. PATIENT-LVL III: CPT | Mod: PBBFAC,,, | Performed by: OPHTHALMOLOGY

## 2023-08-10 PROCEDURE — 1157F ADVNC CARE PLAN IN RCRD: CPT | Mod: CPTII,S$GLB,, | Performed by: OPHTHALMOLOGY

## 2023-08-10 PROCEDURE — 1157F PR ADVANCE CARE PLAN OR EQUIV PRESENT IN MEDICAL RECORD: ICD-10-PCS | Mod: CPTII,S$GLB,, | Performed by: OPHTHALMOLOGY

## 2023-08-10 PROCEDURE — 99024 POSTOP FOLLOW-UP VISIT: CPT | Mod: S$GLB,,, | Performed by: OPHTHALMOLOGY

## 2023-08-10 PROCEDURE — 4010F ACE/ARB THERAPY RXD/TAKEN: CPT | Mod: CPTII,S$GLB,, | Performed by: OPHTHALMOLOGY

## 2023-08-10 PROCEDURE — 4010F PR ACE/ARB THEARPY RXD/TAKEN: ICD-10-PCS | Mod: CPTII,S$GLB,, | Performed by: OPHTHALMOLOGY

## 2023-08-10 PROCEDURE — 1160F RVW MEDS BY RX/DR IN RCRD: CPT | Mod: CPTII,S$GLB,, | Performed by: OPHTHALMOLOGY

## 2023-08-10 PROCEDURE — 1101F PR PT FALLS ASSESS DOC 0-1 FALLS W/OUT INJ PAST YR: ICD-10-PCS | Mod: CPTII,S$GLB,, | Performed by: OPHTHALMOLOGY

## 2023-08-10 PROCEDURE — 3044F PR MOST RECENT HEMOGLOBIN A1C LEVEL <7.0%: ICD-10-PCS | Mod: CPTII,S$GLB,, | Performed by: OPHTHALMOLOGY

## 2023-08-10 PROCEDURE — 99024 PR POST-OP FOLLOW-UP VISIT: ICD-10-PCS | Mod: S$GLB,,, | Performed by: OPHTHALMOLOGY

## 2023-08-10 PROCEDURE — 1159F MED LIST DOCD IN RCRD: CPT | Mod: CPTII,S$GLB,, | Performed by: OPHTHALMOLOGY

## 2023-08-10 PROCEDURE — 3288F FALL RISK ASSESSMENT DOCD: CPT | Mod: CPTII,S$GLB,, | Performed by: OPHTHALMOLOGY

## 2023-08-10 NOTE — PROGRESS NOTES
HPI     Post-op Evaluation     Additional comments: 1 week post Phaco IOL OD. Denies eye pain today   states occasional feeling of irritation. Uisng KETO daily OD & PF OD QID   states compliance.           Last edited by Cintia Ge on 8/10/2023  9:15 AM.            Assessment /Plan     For exam results, see Encounter Report.    Status post cataract surgery, right      POW1 CEIOL  Doing well  D/c moxi  Continue PF QID with taper  Continue keto qdaily  Post op instructions reviewed    F/u 1 month, refract

## 2023-08-12 DIAGNOSIS — I10 HYPERTENSION, UNSPECIFIED TYPE: ICD-10-CM

## 2023-08-12 NOTE — TELEPHONE ENCOUNTER
No care due was identified.  [facility] Embedded Care Due Messages. Reference number: [id number].   8/12/2023 4:46:02 AM CDT

## 2023-08-14 DIAGNOSIS — B37.2 YEAST DERMATITIS: ICD-10-CM

## 2023-08-14 RX ORDER — FLUCONAZOLE 100 MG/1
200 TABLET ORAL DAILY
Qty: 60 TABLET | Refills: 0 | Status: SHIPPED | OUTPATIENT
Start: 2023-08-14 | End: 2023-09-13

## 2023-08-14 NOTE — TELEPHONE ENCOUNTER
Refill Routing Note   Medication(s) are not appropriate for processing by [facility] for the following reason(s):      Medication outside of protocol  Patient seen in ED/Hospital since LOV with provider    [facility] action(s):  Defer  Route Care Due:  None identified              Appointments  past 12m or future 3m with PCP    Date Provider   Last Visit   4/18/2023 Constantine Bird MD   Next Visit   11/3/2023 Constantine Bird MD   ED visits in past 90 days: 0        Note composed:6:50 AM 08/14/2023

## 2023-08-15 DIAGNOSIS — R09.81 CONGESTION OF NASAL SINUS: ICD-10-CM

## 2023-08-15 NOTE — TELEPHONE ENCOUNTER
No care due was identified.  [facility] Embedded Care Due Messages. Reference number: [id number].   8/15/2023 5:23:42 PM CDT

## 2023-08-16 RX ORDER — FLUTICASONE PROPIONATE 50 MCG
1 SPRAY, SUSPENSION (ML) NASAL
Qty: 48 G | Refills: 3 | Status: SHIPPED | OUTPATIENT
Start: 2023-08-16

## 2023-08-16 RX ORDER — METFORMIN HYDROCHLORIDE 500 MG/1
TABLET ORAL
Qty: 90 TABLET | Refills: 3 | Status: SHIPPED | OUTPATIENT
Start: 2023-08-16

## 2023-08-16 RX ORDER — APIXABAN 5 MG/1
5 TABLET, FILM COATED ORAL 2 TIMES DAILY
Qty: 180 TABLET | Refills: 3 | Status: SHIPPED | OUTPATIENT
Start: 2023-08-16

## 2023-08-30 DIAGNOSIS — Z51.81 THERAPEUTIC DRUG MONITORING: ICD-10-CM

## 2023-08-30 DIAGNOSIS — M51.36 LUMBAR DEGENERATIVE DISC DISEASE: ICD-10-CM

## 2023-08-30 NOTE — TELEPHONE ENCOUNTER
No care due was identified.  [facility] Embedded Care Due Messages. Reference number: [id number].   8/30/2023 7:59:37 AM CDT

## 2023-08-31 ENCOUNTER — PATIENT MESSAGE (OUTPATIENT)
Dept: FAMILY MEDICINE | Facility: CLINIC | Age: 73
End: 2023-08-31
Payer: MEDICARE

## 2023-08-31 DIAGNOSIS — Z51.81 THERAPEUTIC DRUG MONITORING: ICD-10-CM

## 2023-08-31 DIAGNOSIS — M51.36 LUMBAR DEGENERATIVE DISC DISEASE: ICD-10-CM

## 2023-08-31 RX ORDER — HYDROCODONE BITARTRATE AND ACETAMINOPHEN 7.5; 325 MG/1; MG/1
1 TABLET ORAL EVERY 12 HOURS PRN
Qty: 60 TABLET | Refills: 0 | Status: CANCELLED | OUTPATIENT
Start: 2023-08-31 | End: 2023-09-30

## 2023-08-31 RX ORDER — HYDROCODONE BITARTRATE AND ACETAMINOPHEN 7.5; 325 MG/1; MG/1
1 TABLET ORAL EVERY 12 HOURS PRN
Qty: 60 TABLET | Refills: 0 | Status: SHIPPED | OUTPATIENT
Start: 2023-08-31 | End: 2023-09-28 | Stop reason: SDUPTHER

## 2023-08-31 NOTE — TELEPHONE ENCOUNTER
No care due was identified.  [facility] Embedded Care Due Messages. Reference number: [id number].   8/31/2023 9:19:18 AM CDT

## 2023-08-31 NOTE — TELEPHONE ENCOUNTER
Requested Prescriptions     Signed Prescriptions Disp Refills    HYDROcodone-acetaminophen (NORCO) 7.5-325 mg per tablet 60 tablet 0     Sig: Take 1 tablet by mouth every 12 (twelve) hours as needed for Pain. Medical necessary for greater than 7 days for chronic pain.     Authorizing Provider: KANG ESPINO

## 2023-09-05 DIAGNOSIS — I50.22 CHRONIC SYSTOLIC CONGESTIVE HEART FAILURE: ICD-10-CM

## 2023-09-05 RX ORDER — FUROSEMIDE 40 MG/1
40 TABLET ORAL DAILY
Qty: 6 TABLET | Refills: 9 | Status: SHIPPED | OUTPATIENT
Start: 2023-09-05 | End: 2023-09-28 | Stop reason: SDUPTHER

## 2023-09-05 NOTE — TELEPHONE ENCOUNTER
Refill Routing Note   Medication(s) are not appropriate for processing by [facility] for the following reason(s):      Patient seen in ED/Hospital since LOV with provider  Required vitals abnormal    [facility] action(s):  Defer Care Due:  None identified            Appointments  past 12m or future 3m with PCP    Date Provider   Last Visit   4/18/2023 Constantine Bird MD   Next Visit   11/3/2023 Constantine Bird MD   ED visits in past 90 days: 0        Note composed:1:24 PM 09/05/2023

## 2023-09-05 NOTE — TELEPHONE ENCOUNTER
No care due was identified.  [facility] Embedded Care Due Messages. Reference number: [id number].   9/05/2023 11:45:08 AM CDT

## 2023-09-07 ENCOUNTER — TELEPHONE (OUTPATIENT)
Dept: OPHTHALMOLOGY | Facility: CLINIC | Age: 73
End: 2023-09-07
Payer: MEDICARE

## 2023-09-07 NOTE — TELEPHONE ENCOUNTER
Spoke to pt and rs appt.     Pt states eyes are doing well. Rescheduled for when Dr Bautista returns.     Advised if any changes in vision, pain, flashes, floaters to call and can schedule sooner with optom

## 2023-10-26 ENCOUNTER — OFFICE VISIT (OUTPATIENT)
Dept: INFECTIOUS DISEASES | Facility: CLINIC | Age: 73
End: 2023-10-26
Payer: MEDICARE

## 2023-10-26 ENCOUNTER — PATIENT MESSAGE (OUTPATIENT)
Dept: GASTROENTEROLOGY | Facility: CLINIC | Age: 73
End: 2023-10-26
Payer: MEDICARE

## 2023-10-26 VITALS
BODY MASS INDEX: 40.98 KG/M2 | TEMPERATURE: 98 F | HEART RATE: 58 BPM | HEIGHT: 68 IN | OXYGEN SATURATION: 94 % | DIASTOLIC BLOOD PRESSURE: 72 MMHG | SYSTOLIC BLOOD PRESSURE: 146 MMHG | WEIGHT: 270.38 LBS

## 2023-10-26 DIAGNOSIS — B37.2 YEAST DERMATITIS: ICD-10-CM

## 2023-10-26 DIAGNOSIS — E11.40 TYPE 2 DIABETES MELLITUS WITH DIABETIC NEUROPATHY, WITHOUT LONG-TERM CURRENT USE OF INSULIN: ICD-10-CM

## 2023-10-26 DIAGNOSIS — L03.90 RECURRENT CELLULITIS: Primary | ICD-10-CM

## 2023-10-26 PROCEDURE — 4010F PR ACE/ARB THEARPY RXD/TAKEN: ICD-10-PCS | Mod: CPTII,S$GLB,, | Performed by: INTERNAL MEDICINE

## 2023-10-26 PROCEDURE — 3044F PR MOST RECENT HEMOGLOBIN A1C LEVEL <7.0%: ICD-10-PCS | Mod: CPTII,S$GLB,, | Performed by: INTERNAL MEDICINE

## 2023-10-26 PROCEDURE — 4010F ACE/ARB THERAPY RXD/TAKEN: CPT | Mod: CPTII,S$GLB,, | Performed by: INTERNAL MEDICINE

## 2023-10-26 PROCEDURE — 3008F PR BODY MASS INDEX (BMI) DOCUMENTED: ICD-10-PCS | Mod: CPTII,S$GLB,, | Performed by: INTERNAL MEDICINE

## 2023-10-26 PROCEDURE — 3078F PR MOST RECENT DIASTOLIC BLOOD PRESSURE < 80 MM HG: ICD-10-PCS | Mod: CPTII,S$GLB,, | Performed by: INTERNAL MEDICINE

## 2023-10-26 PROCEDURE — 1101F PT FALLS ASSESS-DOCD LE1/YR: CPT | Mod: CPTII,S$GLB,, | Performed by: INTERNAL MEDICINE

## 2023-10-26 PROCEDURE — 3078F DIAST BP <80 MM HG: CPT | Mod: CPTII,S$GLB,, | Performed by: INTERNAL MEDICINE

## 2023-10-26 PROCEDURE — 3077F PR MOST RECENT SYSTOLIC BLOOD PRESSURE >= 140 MM HG: ICD-10-PCS | Mod: CPTII,S$GLB,, | Performed by: INTERNAL MEDICINE

## 2023-10-26 PROCEDURE — 3044F HG A1C LEVEL LT 7.0%: CPT | Mod: CPTII,S$GLB,, | Performed by: INTERNAL MEDICINE

## 2023-10-26 PROCEDURE — 1159F MED LIST DOCD IN RCRD: CPT | Mod: CPTII,S$GLB,, | Performed by: INTERNAL MEDICINE

## 2023-10-26 PROCEDURE — 1126F PR PAIN SEVERITY QUANTIFIED, NO PAIN PRESENT: ICD-10-PCS | Mod: CPTII,S$GLB,, | Performed by: INTERNAL MEDICINE

## 2023-10-26 PROCEDURE — 3008F BODY MASS INDEX DOCD: CPT | Mod: CPTII,S$GLB,, | Performed by: INTERNAL MEDICINE

## 2023-10-26 PROCEDURE — 1101F PR PT FALLS ASSESS DOC 0-1 FALLS W/OUT INJ PAST YR: ICD-10-PCS | Mod: CPTII,S$GLB,, | Performed by: INTERNAL MEDICINE

## 2023-10-26 PROCEDURE — 1157F ADVNC CARE PLAN IN RCRD: CPT | Mod: CPTII,S$GLB,, | Performed by: INTERNAL MEDICINE

## 2023-10-26 PROCEDURE — 3288F FALL RISK ASSESSMENT DOCD: CPT | Mod: CPTII,S$GLB,, | Performed by: INTERNAL MEDICINE

## 2023-10-26 PROCEDURE — 1126F AMNT PAIN NOTED NONE PRSNT: CPT | Mod: CPTII,S$GLB,, | Performed by: INTERNAL MEDICINE

## 2023-10-26 PROCEDURE — 1159F PR MEDICATION LIST DOCUMENTED IN MEDICAL RECORD: ICD-10-PCS | Mod: CPTII,S$GLB,, | Performed by: INTERNAL MEDICINE

## 2023-10-26 PROCEDURE — 1157F PR ADVANCE CARE PLAN OR EQUIV PRESENT IN MEDICAL RECORD: ICD-10-PCS | Mod: CPTII,S$GLB,, | Performed by: INTERNAL MEDICINE

## 2023-10-26 PROCEDURE — 3077F SYST BP >= 140 MM HG: CPT | Mod: CPTII,S$GLB,, | Performed by: INTERNAL MEDICINE

## 2023-10-26 PROCEDURE — 3288F PR FALLS RISK ASSESSMENT DOCUMENTED: ICD-10-PCS | Mod: CPTII,S$GLB,, | Performed by: INTERNAL MEDICINE

## 2023-10-26 PROCEDURE — 99214 PR OFFICE/OUTPT VISIT, EST, LEVL IV, 30-39 MIN: ICD-10-PCS | Mod: S$GLB,,, | Performed by: INTERNAL MEDICINE

## 2023-10-26 PROCEDURE — 99214 OFFICE O/P EST MOD 30 MIN: CPT | Mod: S$GLB,,, | Performed by: INTERNAL MEDICINE

## 2023-10-26 RX ORDER — FLUCONAZOLE 100 MG/1
200 TABLET ORAL WEEKLY
Qty: 50 TABLET | Refills: 0 | Status: SHIPPED | OUTPATIENT
Start: 2023-10-26 | End: 2024-02-23 | Stop reason: ALTCHOICE

## 2023-10-26 RX ORDER — CEFADROXIL 500 MG/1
500 CAPSULE ORAL EVERY 12 HOURS
Qty: 180 CAPSULE | Refills: 1 | Status: ON HOLD | OUTPATIENT
Start: 2023-10-26 | End: 2024-02-21 | Stop reason: HOSPADM
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
## 2023-11-02 DIAGNOSIS — Z51.81 THERAPEUTIC DRUG MONITORING: ICD-10-CM

## 2023-11-02 DIAGNOSIS — K31.84 GASTROPARESIS DIABETICORUM: ICD-10-CM

## 2023-11-02 DIAGNOSIS — E11.43 GASTROPARESIS DIABETICORUM: ICD-10-CM

## 2023-11-02 DIAGNOSIS — R11.11 NON-INTRACTABLE VOMITING WITHOUT NAUSEA: ICD-10-CM

## 2023-11-02 DIAGNOSIS — M51.36 LUMBAR DEGENERATIVE DISC DISEASE: ICD-10-CM

## 2023-11-02 RX ORDER — ONDANSETRON 8 MG/1
8 TABLET, ORALLY DISINTEGRATING ORAL EVERY 12 HOURS PRN
Qty: 40 TABLET | Refills: 3 | Status: SHIPPED | OUTPATIENT
Start: 2023-11-02 | End: 2024-03-07 | Stop reason: SDUPTHER

## 2023-11-02 RX ORDER — HYDROCODONE BITARTRATE AND ACETAMINOPHEN 7.5; 325 MG/1; MG/1
1 TABLET ORAL EVERY 12 HOURS PRN
Qty: 60 TABLET | Refills: 0 | OUTPATIENT
Start: 2023-11-02 | End: 2023-12-02

## 2023-11-02 NOTE — TELEPHONE ENCOUNTER
Patient was on the schedule to be seen tomorrow. These are the medications she stated she needs refills for. Thank you

## 2023-11-02 NOTE — TELEPHONE ENCOUNTER
No care due was identified.  [facility] Embedded Care Due Messages. Reference number: [id number].   11/02/2023 10:31:42 AM CDT

## 2023-11-06 ENCOUNTER — TELEPHONE (OUTPATIENT)
Dept: OPHTHALMOLOGY | Facility: CLINIC | Age: 73
End: 2023-11-06
Payer: MEDICARE

## 2023-11-06 NOTE — TELEPHONE ENCOUNTER
Spoke to pt and rescheduled appt     ----- Message from Aung Miller sent at 11/6/2023 11:59 AM CST -----  Regarding: ret call  Contact: NYLA GIBBS [[id number]]  Type:  Patient Returning Call    Who Called:  Nyla    Who Left Message for Patient:  Brittany    Does the patient know what this is regarding?:  No    Best Call Back Number:  [phone]    Additional Information:  Please call to advise.

## 2023-11-09 DIAGNOSIS — E11.9 TYPE 2 DIABETES MELLITUS WITHOUT COMPLICATION: ICD-10-CM

## 2023-11-10 DIAGNOSIS — Z51.81 THERAPEUTIC DRUG MONITORING: ICD-10-CM

## 2023-11-10 DIAGNOSIS — M51.36 LUMBAR DEGENERATIVE DISC DISEASE: ICD-10-CM

## 2023-11-10 NOTE — TELEPHONE ENCOUNTER
No care due was identified.  [facility] Embedded Care Due Messages. Reference number: [id number].   11/10/2023 6:46:15 AM CST

## 2023-11-12 RX ORDER — GABAPENTIN 800 MG/1
800 TABLET ORAL 3 TIMES DAILY
Qty: 90 TABLET | Refills: 11 | Status: SHIPPED | OUTPATIENT
Start: 2023-11-12

## 2023-12-03 NOTE — TELEPHONE ENCOUNTER
No care due was identified.  Health [facility] Embedded Care Due Messages. Reference number: [id number].   12/03/2023 5:18:08 AM CST

## 2023-12-03 NOTE — TELEPHONE ENCOUNTER
Patient requesting refill of Trelegy Ellipta.  Preferred pharmacy is [facility].   LR--2-14-23  LOV--7-13-23  FOV--3-4-34

## 2023-12-04 RX ORDER — FLUTICASONE FUROATE, UMECLIDINIUM BROMIDE AND VILANTEROL TRIFENATATE 100; 62.5; 25 UG/1; UG/1; UG/1
POWDER RESPIRATORY (INHALATION)
Qty: 90 EACH | Refills: 3 | Status: SHIPPED | OUTPATIENT
Start: 2023-12-04

## 2023-12-04 NOTE — TELEPHONE ENCOUNTER
Refill Routing Note   Medication(s) are not appropriate for processing by [facility] for the following reason(s):        No active prescription written by provider  ED/Hospital Visit since last OV with provider    [facility] action(s):  Defer        Medication Therapy Plan: Historical Medication, medication not active.      Appointments  past 12m or future 3m with PCP    Date Provider   Last Visit   4/18/2023 Constantine Bird MD   Next Visit   3/4/2024 Constantine Bird MD   ED visits in past 90 days: 0        Note composed:10:28 AM 12/04/2023

## 2023-12-21 ENCOUNTER — OFFICE VISIT (OUTPATIENT)
Dept: OPHTHALMOLOGY | Facility: CLINIC | Age: 73
End: 2023-12-21
Payer: MEDICARE

## 2023-12-21 DIAGNOSIS — Z98.41 STATUS POST CATARACT SURGERY, RIGHT: Primary | ICD-10-CM

## 2023-12-21 PROCEDURE — 1101F PR PT FALLS ASSESS DOC 0-1 FALLS W/OUT INJ PAST YR: ICD-10-PCS | Mod: CPTII,S$GLB,, | Performed by: OPHTHALMOLOGY

## 2023-12-21 PROCEDURE — 1157F PR ADVANCE CARE PLAN OR EQUIV PRESENT IN MEDICAL RECORD: ICD-10-PCS | Mod: CPTII,S$GLB,, | Performed by: OPHTHALMOLOGY

## 2023-12-21 PROCEDURE — 99999 PR PBB SHADOW E&M-EST. PATIENT-LVL III: ICD-10-PCS | Mod: PBBFAC,,, | Performed by: OPHTHALMOLOGY

## 2023-12-21 PROCEDURE — 99024 POSTOP FOLLOW-UP VISIT: CPT | Mod: S$GLB,,, | Performed by: OPHTHALMOLOGY

## 2023-12-21 PROCEDURE — 4010F PR ACE/ARB THEARPY RXD/TAKEN: ICD-10-PCS | Mod: CPTII,S$GLB,, | Performed by: OPHTHALMOLOGY

## 2023-12-21 PROCEDURE — 99024 PR POST-OP FOLLOW-UP VISIT: ICD-10-PCS | Mod: S$GLB,,, | Performed by: OPHTHALMOLOGY

## 2023-12-21 PROCEDURE — 3288F FALL RISK ASSESSMENT DOCD: CPT | Mod: CPTII,S$GLB,, | Performed by: OPHTHALMOLOGY

## 2023-12-21 PROCEDURE — 1126F PR PAIN SEVERITY QUANTIFIED, NO PAIN PRESENT: ICD-10-PCS | Mod: CPTII,S$GLB,, | Performed by: OPHTHALMOLOGY

## 2023-12-21 PROCEDURE — 3044F PR MOST RECENT HEMOGLOBIN A1C LEVEL <7.0%: ICD-10-PCS | Mod: CPTII,S$GLB,, | Performed by: OPHTHALMOLOGY

## 2023-12-21 PROCEDURE — 3288F PR FALLS RISK ASSESSMENT DOCUMENTED: ICD-10-PCS | Mod: CPTII,S$GLB,, | Performed by: OPHTHALMOLOGY

## 2023-12-21 PROCEDURE — 1101F PT FALLS ASSESS-DOCD LE1/YR: CPT | Mod: CPTII,S$GLB,, | Performed by: OPHTHALMOLOGY

## 2023-12-21 PROCEDURE — 1126F AMNT PAIN NOTED NONE PRSNT: CPT | Mod: CPTII,S$GLB,, | Performed by: OPHTHALMOLOGY

## 2023-12-21 PROCEDURE — 1160F RVW MEDS BY RX/DR IN RCRD: CPT | Mod: CPTII,S$GLB,, | Performed by: OPHTHALMOLOGY

## 2023-12-21 PROCEDURE — 1157F ADVNC CARE PLAN IN RCRD: CPT | Mod: CPTII,S$GLB,, | Performed by: OPHTHALMOLOGY

## 2023-12-21 PROCEDURE — 99999 PR PBB SHADOW E&M-EST. PATIENT-LVL III: CPT | Mod: PBBFAC,,, | Performed by: OPHTHALMOLOGY

## 2023-12-21 PROCEDURE — 1160F PR REVIEW ALL MEDS BY PRESCRIBER/CLIN PHARMACIST DOCUMENTED: ICD-10-PCS | Mod: CPTII,S$GLB,, | Performed by: OPHTHALMOLOGY

## 2023-12-21 PROCEDURE — 1159F PR MEDICATION LIST DOCUMENTED IN MEDICAL RECORD: ICD-10-PCS | Mod: CPTII,S$GLB,, | Performed by: OPHTHALMOLOGY

## 2023-12-21 PROCEDURE — 4010F ACE/ARB THERAPY RXD/TAKEN: CPT | Mod: CPTII,S$GLB,, | Performed by: OPHTHALMOLOGY

## 2023-12-21 PROCEDURE — 3044F HG A1C LEVEL LT 7.0%: CPT | Mod: CPTII,S$GLB,, | Performed by: OPHTHALMOLOGY

## 2023-12-21 PROCEDURE — 1159F MED LIST DOCD IN RCRD: CPT | Mod: CPTII,S$GLB,, | Performed by: OPHTHALMOLOGY

## 2023-12-21 NOTE — PROGRESS NOTES
HPI    Pt presents for overdue one month post op OD     States OD vision is great. Happy using OTC readers, declines refraction or   RX.     No eye meds     Last edited by Brittany Jones on 12/21/2023 10:32 AM.            Assessment /Plan     For exam results, see Encounter Report.    Status post cataract surgery, right      Pom4 ceiol od  Doing great  Pt declines glasses    Monocular precautions reviewed    F/u 6 months, routine exam, okay to f/u with Dr. Franks

## 2023-12-26 RX ORDER — NYSTATIN AND TRIAMCINOLONE ACETONIDE 100000; 1 [USP'U]/G; MG/G
CREAM TOPICAL 3 TIMES DAILY
Qty: 30 G | Refills: 1 | Status: SHIPPED | OUTPATIENT
Start: 2023-12-26 | End: 2024-03-25

## 2023-12-26 NOTE — TELEPHONE ENCOUNTER
Patient requesting refill of Nystatin-Triamcinolone.  Preferred pharmacy is [facility] ([facility]   LR--7-31-23  LOV--7-13-23  FOV--3-4-24

## 2023-12-26 NOTE — TELEPHONE ENCOUNTER
Refill Routing Note   Medication(s) are not appropriate for processing by [facility] for the following reason(s):        Outside of protocol    [facility] action(s):  Route               Appointments  past 12m or future 3m with PCP    Date Provider   Last Visit   4/18/2023 Constantine Bird MD   Next Visit   3/4/2024 Constantine Bird MD   ED visits in past 90 days: 0        Note composed:10:44 AM 12/26/2023

## 2024-01-02 DIAGNOSIS — M51.36 LUMBAR DEGENERATIVE DISC DISEASE: ICD-10-CM

## 2024-01-02 DIAGNOSIS — Z51.81 THERAPEUTIC DRUG MONITORING: ICD-10-CM

## 2024-01-02 NOTE — TELEPHONE ENCOUNTER
No care due was identified.  Health [facility] Embedded Care Due Messages. Reference number: [id number].   1/02/2024 9:42:09 AM CST

## 2024-01-03 ENCOUNTER — OFFICE VISIT (OUTPATIENT)
Dept: OPTOMETRY | Facility: CLINIC | Age: 74
End: 2024-01-03
Payer: MEDICARE

## 2024-01-03 DIAGNOSIS — H11.31 SUBCONJUNCTIVAL HEMORRHAGE OF RIGHT EYE: Primary | ICD-10-CM

## 2024-01-03 PROCEDURE — 3288F FALL RISK ASSESSMENT DOCD: CPT | Mod: CPTII,S$GLB,, | Performed by: OPTOMETRIST

## 2024-01-03 PROCEDURE — 99999 PR PBB SHADOW E&M-EST. PATIENT-LVL IV: CPT | Mod: PBBFAC,,, | Performed by: OPTOMETRIST

## 2024-01-03 PROCEDURE — 1157F ADVNC CARE PLAN IN RCRD: CPT | Mod: CPTII,S$GLB,, | Performed by: OPTOMETRIST

## 2024-01-03 PROCEDURE — 1160F RVW MEDS BY RX/DR IN RCRD: CPT | Mod: CPTII,S$GLB,, | Performed by: OPTOMETRIST

## 2024-01-03 PROCEDURE — 1159F MED LIST DOCD IN RCRD: CPT | Mod: CPTII,S$GLB,, | Performed by: OPTOMETRIST

## 2024-01-03 PROCEDURE — 99213 OFFICE O/P EST LOW 20 MIN: CPT | Mod: S$GLB,,, | Performed by: OPTOMETRIST

## 2024-01-03 PROCEDURE — 1101F PT FALLS ASSESS-DOCD LE1/YR: CPT | Mod: CPTII,S$GLB,, | Performed by: OPTOMETRIST

## 2024-01-03 PROCEDURE — 1126F AMNT PAIN NOTED NONE PRSNT: CPT | Mod: CPTII,S$GLB,, | Performed by: OPTOMETRIST

## 2024-01-03 NOTE — LETTER
January 3, 2024      [facility]  [address] [place] [address] [address]  [place] [id number]  Phone: [phone]       Patient: Nelly Tian   YOB: 1950  Date of Visit: 01/03/2024    To Whom It May Concern:    Maye Tirado was at [facility] on 01/03/2024 with her mother Nelly. She may return to work/school on 01/04/2024 with no restrictions. If you have any questions or concerns, or if I can be of further assistance, please do not hesitate to contact me.    Sincerely,    Dell Franks OD

## 2024-01-03 NOTE — PROGRESS NOTES
HPI    74 YO female presents today for an urgent visit. Patient states that OD is   red and has FB sensation x1 week or so. Patient denies any trauma to eye.   Had cataract surgery in August with Dr. Bautista.   Last edited by Tiffanie Perez on 1/3/2024  9:34 AM.            Assessment /Plan     For exam results, see Encounter Report.    Subconjunctival hemorrhage of right eye      Inferior subconj heme OD  Discussed self-resolution in 1-2 weeks  Recommend otc artificial tears 2-4 times a day prn  Report back if worsening or no resolution

## 2024-01-05 RX ORDER — HYDROCODONE BITARTRATE AND ACETAMINOPHEN 7.5; 325 MG/1; MG/1
1 TABLET ORAL EVERY 12 HOURS PRN
Qty: 60 TABLET | Refills: 0 | Status: SHIPPED | OUTPATIENT
Start: 2024-01-05 | End: 2024-02-02 | Stop reason: SDUPTHER

## 2024-01-05 RX ORDER — ALBUTEROL SULFATE 90 UG/1
AEROSOL, METERED RESPIRATORY (INHALATION)
Qty: 18 G | Refills: 5 | Status: SHIPPED | OUTPATIENT
Start: 2024-01-05

## 2024-01-05 NOTE — TELEPHONE ENCOUNTER
No care due was identified.  Health [facility] Embedded Care Due Messages. Reference number: [id number].   1/05/2024 3:29:23 PM CST

## 2024-01-08 ENCOUNTER — PATIENT MESSAGE (OUTPATIENT)
Dept: PRIMARY CARE CLINIC | Facility: CLINIC | Age: 74
End: 2024-01-08
Payer: MEDICARE

## 2024-01-17 ENCOUNTER — PATIENT MESSAGE (OUTPATIENT)
Dept: GASTROENTEROLOGY | Facility: CLINIC | Age: 74
End: 2024-01-17
Payer: MEDICARE

## 2024-02-02 DIAGNOSIS — M51.36 LUMBAR DEGENERATIVE DISC DISEASE: ICD-10-CM

## 2024-02-02 DIAGNOSIS — L03.90 RECURRENT CELLULITIS: ICD-10-CM

## 2024-02-02 DIAGNOSIS — Z51.81 THERAPEUTIC DRUG MONITORING: ICD-10-CM

## 2024-02-02 RX ORDER — CEFADROXIL 500 MG/1
500 CAPSULE ORAL EVERY 12 HOURS
Qty: 180 CAPSULE | Refills: 1 | OUTPATIENT
Start: 2024-02-02 | End: 2024-07-31

## 2024-02-02 RX ORDER — HYDROCODONE BITARTRATE AND ACETAMINOPHEN 7.5; 325 MG/1; MG/1
1 TABLET ORAL EVERY 12 HOURS PRN
Qty: 60 TABLET | Refills: 0 | Status: SHIPPED | OUTPATIENT
Start: 2024-02-02 | End: 2024-02-23 | Stop reason: SDUPTHER

## 2024-02-02 NOTE — TELEPHONE ENCOUNTER
Thanks. .  Requested Prescriptions     Signed Prescriptions Disp Refills    HYDROcodone-acetaminophen (NORCO) 7.5-325 mg per tablet 60 tablet 0     Sig: Take 1 tablet by mouth every 12 (twelve) hours as needed for Pain. Medical necessary for greater than 7 days for chronic pain.     Authorizing Provider: KANG ESPINO

## 2024-02-02 NOTE — TELEPHONE ENCOUNTER
Care Due:                  Date            Visit Type   Department     Provider  --------------------------------------------------------------------------------                                EP -                              PRIMARY      [facility]  Last Visit: 04-      CARE ([facility])   MEDICINE       Constantine Bird                              EP -                              PRIMARY  Next Visit: 03-      CARE ([facility])   None Found     Constantine Bird                                                            Last  Test          Frequency    Reason                     Performed    Due Date  --------------------------------------------------------------------------------    HBA1C.......  6 months...  empagliflozin, metFORMIN.  04-   10-    Lipid Panel.  12 months..  atorvastatin.............  03- 03-    Health [facility] Embedded Care Due Messages. Reference number: [id number].   2/02/2024 6:50:04 AM CST

## 2024-02-10 DIAGNOSIS — E11.51 TYPE 2 DIABETES MELLITUS WITH DIABETIC PERIPHERAL ANGIOPATHY WITHOUT GANGRENE, WITHOUT LONG-TERM CURRENT USE OF INSULIN: ICD-10-CM

## 2024-02-10 DIAGNOSIS — I50.30 DIASTOLIC CHF WITH PRESERVED LEFT VENTRICULAR FUNCTION, NYHA CLASS 2: ICD-10-CM

## 2024-02-10 NOTE — TELEPHONE ENCOUNTER
No care due was identified.  Health [facility] Embedded Care Due Messages. Reference number: [id number].   2/10/2024 11:48:12 AM CST

## 2024-02-12 RX ORDER — SPIRONOLACTONE 25 MG/1
25 TABLET ORAL DAILY
Qty: 90 TABLET | Refills: 4 | Status: SHIPPED | OUTPATIENT
Start: 2024-02-12 | End: 2024-02-23

## 2024-02-19 ENCOUNTER — TELEPHONE (OUTPATIENT)
Dept: PRIMARY CARE CLINIC | Facility: CLINIC | Age: 74
End: 2024-02-19
Payer: MEDICARE

## 2024-02-19 ENCOUNTER — HOSPITAL ENCOUNTER (INPATIENT)
Facility: HOSPITAL | Age: 74
LOS: 2 days | Discharge: HOME HEALTH | DRG: 189 | End: 2024-02-21
Attending: STUDENT IN AN ORGANIZED HEALTH CARE EDUCATION/TRAINING PROGRAM | Admitting: HOSPITALIST
Payer: MEDICARE

## 2024-02-19 ENCOUNTER — PATIENT MESSAGE (OUTPATIENT)
Dept: PRIMARY CARE CLINIC | Facility: CLINIC | Age: 74
End: 2024-02-19
Payer: MEDICARE

## 2024-02-19 DIAGNOSIS — J96.21 ACUTE ON CHRONIC RESPIRATORY FAILURE WITH HYPOXIA AND HYPERCAPNIA: ICD-10-CM

## 2024-02-19 DIAGNOSIS — J44.0 CHRONIC OBSTRUCTIVE PULMONARY DISEASE WITH ACUTE LOWER RESPIRATORY INFECTION: Primary | ICD-10-CM

## 2024-02-19 DIAGNOSIS — E87.29 RESPIRATORY ACIDOSIS: ICD-10-CM

## 2024-02-19 DIAGNOSIS — J18.9 PNEUMONIA OF BOTH LUNGS DUE TO INFECTIOUS ORGANISM, UNSPECIFIED PART OF LUNG: ICD-10-CM

## 2024-02-19 DIAGNOSIS — J18.9 PNEUMONIA: ICD-10-CM

## 2024-02-19 DIAGNOSIS — J96.22 ACUTE ON CHRONIC RESPIRATORY FAILURE WITH HYPOXIA AND HYPERCAPNIA: ICD-10-CM

## 2024-02-19 DIAGNOSIS — J44.1 COPD EXACERBATION: ICD-10-CM

## 2024-02-19 DIAGNOSIS — R65.20 SEVERE SEPSIS: Primary | ICD-10-CM

## 2024-02-19 DIAGNOSIS — R00.0 TACHYCARDIA: ICD-10-CM

## 2024-02-19 DIAGNOSIS — I50.42 CHRONIC COMBINED SYSTOLIC AND DIASTOLIC CHF (CONGESTIVE HEART FAILURE): ICD-10-CM

## 2024-02-19 DIAGNOSIS — A41.9 SEVERE SEPSIS: Primary | ICD-10-CM

## 2024-02-19 LAB
ALBUMIN SERPL BCP-MCNC: 3.3 G/DL (ref 3.5–5.2)
ALLENS TEST: ABNORMAL
ALLENS TEST: ABNORMAL
ALP SERPL-CCNC: 89 U/L (ref 55–135)
ALT SERPL W/O P-5'-P-CCNC: 21 U/L (ref 10–44)
AMMONIA PLAS-SCNC: 49 UMOL/L (ref 10–50)
ANION GAP SERPL CALC-SCNC: 10 MMOL/L (ref 8–16)
AST SERPL-CCNC: 25 U/L (ref 10–40)
BACTERIA #/AREA URNS HPF: NORMAL /HPF
BASOPHILS # BLD AUTO: 0.05 K/UL (ref 0–0.2)
BASOPHILS NFR BLD: 0.4 % (ref 0–1.9)
BILIRUB SERPL-MCNC: 0.6 MG/DL (ref 0.1–1)
BILIRUB UR QL STRIP: NEGATIVE
BNP SERPL-MCNC: 250 PG/ML (ref 0–99)
BUN SERPL-MCNC: 15 MG/DL (ref 8–23)
CALCIUM SERPL-MCNC: 9 MG/DL (ref 8.7–10.5)
CHLORIDE SERPL-SCNC: 102 MMOL/L (ref 95–110)
CK SERPL-CCNC: 53 U/L (ref 20–180)
CLARITY UR: CLEAR
CO2 SERPL-SCNC: 24 MMOL/L (ref 23–29)
COLOR UR: YELLOW
CREAT SERPL-MCNC: 0.6 MG/DL (ref 0.5–1.4)
DELSYS: ABNORMAL
DELSYS: ABNORMAL
DIFFERENTIAL METHOD BLD: ABNORMAL
EOSINOPHIL # BLD AUTO: 0.3 K/UL (ref 0–0.5)
EOSINOPHIL NFR BLD: 2.6 % (ref 0–8)
EP: 6
ERYTHROCYTE [DISTWIDTH] IN BLOOD BY AUTOMATED COUNT: 14.4 % (ref 11.5–14.5)
EST. GFR  (NO RACE VARIABLE): >60 ML/MIN/1.73 M^2
ESTIMATED AVG GLUCOSE: 126 MG/DL (ref 68–131)
FIO2: 36
FIO2: 40
FLOW: 4
GLUCOSE SERPL-MCNC: 115 MG/DL (ref 70–110)
GLUCOSE UR QL STRIP: ABNORMAL
HBA1C MFR BLD: 6 % (ref 4.5–6.2)
HCO3 UR-SCNC: 30.8 MMOL/L (ref 24–28)
HCO3 UR-SCNC: 34.4 MMOL/L (ref 24–28)
HCT VFR BLD AUTO: 41.6 % (ref 37–48.5)
HGB BLD-MCNC: 12.9 G/DL (ref 12–16)
HGB UR QL STRIP: ABNORMAL
IMM GRANULOCYTES # BLD AUTO: 0.06 K/UL (ref 0–0.04)
IMM GRANULOCYTES NFR BLD AUTO: 0.5 % (ref 0–0.5)
INFLUENZA A, MOLECULAR: NEGATIVE
INFLUENZA B, MOLECULAR: NEGATIVE
IP: 12
KETONES UR QL STRIP: NEGATIVE
LACTATE SERPL-SCNC: 1.1 MMOL/L (ref 0.5–2.2)
LDH SERPL L TO P-CCNC: 0.83 MMOL/L (ref 0.36–1.25)
LDH SERPL L TO P-CCNC: 2.47 MMOL/L (ref 0.5–2.2)
LEUKOCYTE ESTERASE UR QL STRIP: NEGATIVE
LIPASE SERPL-CCNC: 17 U/L (ref 4–60)
LYMPHOCYTES # BLD AUTO: 1.3 K/UL (ref 1–4.8)
LYMPHOCYTES NFR BLD: 9.9 % (ref 18–48)
MAGNESIUM SERPL-MCNC: 2 MG/DL (ref 1.6–2.6)
MCH RBC QN AUTO: 30.6 PG (ref 27–31)
MCHC RBC AUTO-ENTMCNC: 31 G/DL (ref 32–36)
MCV RBC AUTO: 99 FL (ref 82–98)
MICROSCOPIC COMMENT: NORMAL
MIN VOL: 16.2
MODE: ABNORMAL
MODE: ABNORMAL
MONOCYTES # BLD AUTO: 1.5 K/UL (ref 0.3–1)
MONOCYTES NFR BLD: 11.7 % (ref 4–15)
NEUTROPHILS # BLD AUTO: 9.7 K/UL (ref 1.8–7.7)
NEUTROPHILS NFR BLD: 74.9 % (ref 38–73)
NITRITE UR QL STRIP: NEGATIVE
NRBC BLD-RTO: 0 /100 WBC
PCO2 BLDA: 47.2 MMHG (ref 35–45)
PCO2 BLDA: 68.5 MMHG (ref 35–45)
PH SMN: 7.31 [PH] (ref 7.35–7.45)
PH SMN: 7.42 [PH] (ref 7.35–7.45)
PH UR STRIP: 6 [PH] (ref 5–8)
PLATELET # BLD AUTO: 200 K/UL (ref 150–450)
PMV BLD AUTO: 10.5 FL (ref 9.2–12.9)
PO2 BLDA: 17 MMHG (ref 40–60)
PO2 BLDA: 89 MMHG (ref 80–100)
POC BE: 6 MMOL/L
POC BE: 8 MMOL/L
POC SATURATED O2: 20 % (ref 95–100)
POC SATURATED O2: 97 % (ref 95–100)
POC TCO2: 32 MMOL/L (ref 23–27)
POC TCO2: 36 MMOL/L (ref 24–29)
POCT GLUCOSE: 158 MG/DL (ref 70–110)
POCT GLUCOSE: 161 MG/DL (ref 70–110)
POCT GLUCOSE: 177 MG/DL (ref 70–110)
POCT GLUCOSE: 180 MG/DL (ref 70–110)
POTASSIUM SERPL-SCNC: 4.7 MMOL/L (ref 3.5–5.1)
PROCALCITONIN SERPL IA-MCNC: 0.04 NG/ML (ref 0–0.5)
PROT SERPL-MCNC: 7.1 G/DL (ref 6–8.4)
PROT UR QL STRIP: NEGATIVE
RBC # BLD AUTO: 4.21 M/UL (ref 4–5.4)
RBC #/AREA URNS HPF: 1 /HPF (ref 0–4)
RSV AG SPEC QL IA: NEGATIVE
SAMPLE: ABNORMAL
SAMPLE: ABNORMAL
SARS-COV-2 RDRP RESP QL NAA+PROBE: NEGATIVE
SITE: ABNORMAL
SITE: ABNORMAL
SODIUM SERPL-SCNC: 136 MMOL/L (ref 136–145)
SP GR UR STRIP: 1.02 (ref 1–1.03)
SP02: 94
SP02: 94
SPECIMEN SOURCE: NORMAL
SPECIMEN SOURCE: NORMAL
SPONT RATE: 30
SQUAMOUS #/AREA URNS HPF: 7 /HPF
TROPONIN I SERPL DL<=0.01 NG/ML-MCNC: 0.01 NG/ML (ref 0–0.03)
URN SPEC COLLECT METH UR: ABNORMAL
UROBILINOGEN UR STRIP-ACNC: NEGATIVE EU/DL
WBC # BLD AUTO: 12.86 K/UL (ref 3.9–12.7)
WBC #/AREA URNS HPF: 2 /HPF (ref 0–5)
YEAST URNS QL MICRO: NORMAL

## 2024-02-19 PROCEDURE — 36600 WITHDRAWAL OF ARTERIAL BLOOD: CPT

## 2024-02-19 PROCEDURE — 11000001 HC ACUTE MED/SURG PRIVATE ROOM

## 2024-02-19 PROCEDURE — 25000003 PHARM REV CODE 250

## 2024-02-19 PROCEDURE — U0002 COVID-19 LAB TEST NON-CDC: HCPCS | Performed by: STUDENT IN AN ORGANIZED HEALTH CARE EDUCATION/TRAINING PROGRAM

## 2024-02-19 PROCEDURE — 94761 N-INVAS EAR/PLS OXIMETRY MLT: CPT | Mod: XB

## 2024-02-19 PROCEDURE — 63600175 PHARM REV CODE 636 W HCPCS: Performed by: HOSPITALIST

## 2024-02-19 PROCEDURE — 99900035 HC TECH TIME PER 15 MIN (STAT)

## 2024-02-19 PROCEDURE — 25000242 PHARM REV CODE 250 ALT 637 W/ HCPCS

## 2024-02-19 PROCEDURE — 25000242 PHARM REV CODE 250 ALT 637 W/ HCPCS: Performed by: NURSE PRACTITIONER

## 2024-02-19 PROCEDURE — 94640 AIRWAY INHALATION TREATMENT: CPT

## 2024-02-19 PROCEDURE — 63600175 PHARM REV CODE 636 W HCPCS: Performed by: STUDENT IN AN ORGANIZED HEALTH CARE EDUCATION/TRAINING PROGRAM

## 2024-02-19 PROCEDURE — 25000003 PHARM REV CODE 250: Performed by: STUDENT IN AN ORGANIZED HEALTH CARE EDUCATION/TRAINING PROGRAM

## 2024-02-19 PROCEDURE — 82140 ASSAY OF AMMONIA: CPT | Performed by: STUDENT IN AN ORGANIZED HEALTH CARE EDUCATION/TRAINING PROGRAM

## 2024-02-19 PROCEDURE — 82803 BLOOD GASES ANY COMBINATION: CPT

## 2024-02-19 PROCEDURE — 94660 CPAP INITIATION&MGMT: CPT

## 2024-02-19 PROCEDURE — 83605 ASSAY OF LACTIC ACID: CPT

## 2024-02-19 PROCEDURE — 93010 ELECTROCARDIOGRAM REPORT: CPT | Mod: ,,, | Performed by: GENERAL PRACTICE

## 2024-02-19 PROCEDURE — 63600175 PHARM REV CODE 636 W HCPCS

## 2024-02-19 PROCEDURE — 87040 BLOOD CULTURE FOR BACTERIA: CPT | Performed by: STUDENT IN AN ORGANIZED HEALTH CARE EDUCATION/TRAINING PROGRAM

## 2024-02-19 PROCEDURE — 83605 ASSAY OF LACTIC ACID: CPT | Performed by: STUDENT IN AN ORGANIZED HEALTH CARE EDUCATION/TRAINING PROGRAM

## 2024-02-19 PROCEDURE — 83880 ASSAY OF NATRIURETIC PEPTIDE: CPT | Performed by: STUDENT IN AN ORGANIZED HEALTH CARE EDUCATION/TRAINING PROGRAM

## 2024-02-19 PROCEDURE — 83036 HEMOGLOBIN GLYCOSYLATED A1C: CPT | Performed by: HOSPITALIST

## 2024-02-19 PROCEDURE — 96375 TX/PRO/DX INJ NEW DRUG ADDON: CPT

## 2024-02-19 PROCEDURE — 36415 COLL VENOUS BLD VENIPUNCTURE: CPT | Performed by: STUDENT IN AN ORGANIZED HEALTH CARE EDUCATION/TRAINING PROGRAM

## 2024-02-19 PROCEDURE — 25000242 PHARM REV CODE 250 ALT 637 W/ HCPCS: Performed by: STUDENT IN AN ORGANIZED HEALTH CARE EDUCATION/TRAINING PROGRAM

## 2024-02-19 PROCEDURE — 27000221 HC OXYGEN, UP TO 24 HOURS

## 2024-02-19 PROCEDURE — 51701 INSERT BLADDER CATHETER: CPT

## 2024-02-19 PROCEDURE — 84484 ASSAY OF TROPONIN QUANT: CPT | Performed by: STUDENT IN AN ORGANIZED HEALTH CARE EDUCATION/TRAINING PROGRAM

## 2024-02-19 PROCEDURE — 83690 ASSAY OF LIPASE: CPT | Performed by: STUDENT IN AN ORGANIZED HEALTH CARE EDUCATION/TRAINING PROGRAM

## 2024-02-19 PROCEDURE — 80053 COMPREHEN METABOLIC PANEL: CPT | Performed by: STUDENT IN AN ORGANIZED HEALTH CARE EDUCATION/TRAINING PROGRAM

## 2024-02-19 PROCEDURE — 83735 ASSAY OF MAGNESIUM: CPT | Performed by: STUDENT IN AN ORGANIZED HEALTH CARE EDUCATION/TRAINING PROGRAM

## 2024-02-19 PROCEDURE — 96367 TX/PROPH/DG ADDL SEQ IV INF: CPT

## 2024-02-19 PROCEDURE — 27100171 HC OXYGEN HIGH FLOW UP TO 24 HOURS

## 2024-02-19 PROCEDURE — 84145 PROCALCITONIN (PCT): CPT | Performed by: STUDENT IN AN ORGANIZED HEALTH CARE EDUCATION/TRAINING PROGRAM

## 2024-02-19 PROCEDURE — 36415 COLL VENOUS BLD VENIPUNCTURE: CPT | Performed by: HOSPITALIST

## 2024-02-19 PROCEDURE — 99291 CRITICAL CARE FIRST HOUR: CPT

## 2024-02-19 PROCEDURE — 93005 ELECTROCARDIOGRAM TRACING: CPT

## 2024-02-19 PROCEDURE — 85025 COMPLETE CBC W/AUTO DIFF WBC: CPT | Performed by: STUDENT IN AN ORGANIZED HEALTH CARE EDUCATION/TRAINING PROGRAM

## 2024-02-19 PROCEDURE — 82550 ASSAY OF CK (CPK): CPT | Performed by: STUDENT IN AN ORGANIZED HEALTH CARE EDUCATION/TRAINING PROGRAM

## 2024-02-19 PROCEDURE — 87502 INFLUENZA DNA AMP PROBE: CPT | Performed by: STUDENT IN AN ORGANIZED HEALTH CARE EDUCATION/TRAINING PROGRAM

## 2024-02-19 PROCEDURE — 87634 RSV DNA/RNA AMP PROBE: CPT | Performed by: STUDENT IN AN ORGANIZED HEALTH CARE EDUCATION/TRAINING PROGRAM

## 2024-02-19 PROCEDURE — 25000003 PHARM REV CODE 250: Performed by: HOSPITALIST

## 2024-02-19 PROCEDURE — 96365 THER/PROPH/DIAG IV INF INIT: CPT

## 2024-02-19 PROCEDURE — 81000 URINALYSIS NONAUTO W/SCOPE: CPT | Performed by: STUDENT IN AN ORGANIZED HEALTH CARE EDUCATION/TRAINING PROGRAM

## 2024-02-19 RX ORDER — GABAPENTIN 400 MG/1
800 CAPSULE ORAL 3 TIMES DAILY
Status: DISCONTINUED | OUTPATIENT
Start: 2024-02-19 | End: 2024-02-21 | Stop reason: HOSPADM

## 2024-02-19 RX ORDER — IBUPROFEN 200 MG
16 TABLET ORAL
Status: DISCONTINUED | OUTPATIENT
Start: 2024-02-19 | End: 2024-02-21 | Stop reason: HOSPADM

## 2024-02-19 RX ORDER — TALC
6 POWDER (GRAM) TOPICAL NIGHTLY PRN
Status: DISCONTINUED | OUTPATIENT
Start: 2024-02-19 | End: 2024-02-21 | Stop reason: HOSPADM

## 2024-02-19 RX ORDER — SODIUM CHLORIDE 0.9 % (FLUSH) 0.9 %
10 SYRINGE (ML) INJECTION EVERY 12 HOURS PRN
Status: DISCONTINUED | OUTPATIENT
Start: 2024-02-19 | End: 2024-02-21 | Stop reason: HOSPADM

## 2024-02-19 RX ORDER — FUROSEMIDE 10 MG/ML
40 INJECTION INTRAMUSCULAR; INTRAVENOUS DAILY
Status: DISCONTINUED | OUTPATIENT
Start: 2024-02-19 | End: 2024-02-21 | Stop reason: HOSPADM

## 2024-02-19 RX ORDER — SPIRONOLACTONE 25 MG/1
25 TABLET ORAL DAILY
Status: DISCONTINUED | OUTPATIENT
Start: 2024-02-19 | End: 2024-02-21 | Stop reason: HOSPADM

## 2024-02-19 RX ORDER — IBUPROFEN 200 MG
24 TABLET ORAL
Status: DISCONTINUED | OUTPATIENT
Start: 2024-02-19 | End: 2024-02-21 | Stop reason: HOSPADM

## 2024-02-19 RX ORDER — PROCHLORPERAZINE EDISYLATE 5 MG/ML
5 INJECTION INTRAMUSCULAR; INTRAVENOUS EVERY 6 HOURS PRN
Status: DISCONTINUED | OUTPATIENT
Start: 2024-02-19 | End: 2024-02-21 | Stop reason: HOSPADM

## 2024-02-19 RX ORDER — INSULIN ASPART 100 [IU]/ML
0-10 INJECTION, SOLUTION INTRAVENOUS; SUBCUTANEOUS
Status: DISCONTINUED | OUTPATIENT
Start: 2024-02-19 | End: 2024-02-21 | Stop reason: HOSPADM

## 2024-02-19 RX ORDER — LISINOPRIL 10 MG/1
20 TABLET ORAL DAILY
Status: DISCONTINUED | OUTPATIENT
Start: 2024-02-19 | End: 2024-02-21 | Stop reason: HOSPADM

## 2024-02-19 RX ORDER — NALOXONE HCL 0.4 MG/ML
0.02 VIAL (ML) INJECTION
Status: DISCONTINUED | OUTPATIENT
Start: 2024-02-19 | End: 2024-02-21 | Stop reason: HOSPADM

## 2024-02-19 RX ORDER — BUDESONIDE 0.5 MG/2ML
0.5 INHALANT ORAL EVERY 12 HOURS
Status: DISCONTINUED | OUTPATIENT
Start: 2024-02-19 | End: 2024-02-21 | Stop reason: HOSPADM

## 2024-02-19 RX ORDER — METHYLPREDNISOLONE SOD SUCC 125 MG
125 VIAL (EA) INJECTION
Status: COMPLETED | OUTPATIENT
Start: 2024-02-19 | End: 2024-02-19

## 2024-02-19 RX ORDER — AMOXICILLIN 250 MG
1 CAPSULE ORAL 2 TIMES DAILY PRN
Status: DISCONTINUED | OUTPATIENT
Start: 2024-02-19 | End: 2024-02-21 | Stop reason: HOSPADM

## 2024-02-19 RX ORDER — IPRATROPIUM BROMIDE AND ALBUTEROL SULFATE 2.5; .5 MG/3ML; MG/3ML
3 SOLUTION RESPIRATORY (INHALATION)
Status: COMPLETED | OUTPATIENT
Start: 2024-02-19 | End: 2024-02-19

## 2024-02-19 RX ORDER — ATORVASTATIN CALCIUM 10 MG/1
10 TABLET, FILM COATED ORAL DAILY
Status: DISCONTINUED | OUTPATIENT
Start: 2024-02-19 | End: 2024-02-21 | Stop reason: HOSPADM

## 2024-02-19 RX ORDER — ONDANSETRON HYDROCHLORIDE 2 MG/ML
4 INJECTION, SOLUTION INTRAVENOUS EVERY 8 HOURS PRN
Status: DISCONTINUED | OUTPATIENT
Start: 2024-02-19 | End: 2024-02-21 | Stop reason: HOSPADM

## 2024-02-19 RX ORDER — IPRATROPIUM BROMIDE AND ALBUTEROL SULFATE 2.5; .5 MG/3ML; MG/3ML
3 SOLUTION RESPIRATORY (INHALATION) EVERY 6 HOURS
Status: DISCONTINUED | OUTPATIENT
Start: 2024-02-19 | End: 2024-02-21 | Stop reason: HOSPADM

## 2024-02-19 RX ORDER — ACETAMINOPHEN 325 MG/1
650 TABLET ORAL
Status: COMPLETED | OUTPATIENT
Start: 2024-02-19 | End: 2024-02-19

## 2024-02-19 RX ORDER — ARFORMOTEROL TARTRATE 15 UG/2ML
15 SOLUTION RESPIRATORY (INHALATION) 2 TIMES DAILY
Status: DISCONTINUED | OUTPATIENT
Start: 2024-02-19 | End: 2024-02-21 | Stop reason: HOSPADM

## 2024-02-19 RX ORDER — ALUMINUM HYDROXIDE, MAGNESIUM HYDROXIDE, AND SIMETHICONE 1200; 120; 1200 MG/30ML; MG/30ML; MG/30ML
30 SUSPENSION ORAL 4 TIMES DAILY PRN
Status: DISCONTINUED | OUTPATIENT
Start: 2024-02-19 | End: 2024-02-21 | Stop reason: HOSPADM

## 2024-02-19 RX ORDER — GLUCAGON 1 MG
1 KIT INJECTION
Status: DISCONTINUED | OUTPATIENT
Start: 2024-02-19 | End: 2024-02-21 | Stop reason: HOSPADM

## 2024-02-19 RX ORDER — ACETAMINOPHEN 325 MG/1
650 TABLET ORAL EVERY 4 HOURS PRN
Status: DISCONTINUED | OUTPATIENT
Start: 2024-02-19 | End: 2024-02-21 | Stop reason: HOSPADM

## 2024-02-19 RX ADMIN — METHYLPREDNISOLONE SODIUM SUCCINATE 125 MG: 125 INJECTION, POWDER, FOR SOLUTION INTRAMUSCULAR; INTRAVENOUS at 02:02

## 2024-02-19 RX ADMIN — ATORVASTATIN CALCIUM 10 MG: 10 TABLET, FILM COATED ORAL at 08:02

## 2024-02-19 RX ADMIN — IPRATROPIUM BROMIDE AND ALBUTEROL SULFATE 3 ML: .5; 2.5 SOLUTION RESPIRATORY (INHALATION) at 01:02

## 2024-02-19 RX ADMIN — DEXTROSE MONOHYDRATE 3.38 G: 5 INJECTION INTRAVENOUS at 02:02

## 2024-02-19 RX ADMIN — GABAPENTIN 800 MG: 400 CAPSULE ORAL at 09:02

## 2024-02-19 RX ADMIN — LISINOPRIL 20 MG: 10 TABLET ORAL at 08:02

## 2024-02-19 RX ADMIN — IPRATROPIUM BROMIDE AND ALBUTEROL SULFATE 3 ML: .5; 2.5 SOLUTION RESPIRATORY (INHALATION) at 07:02

## 2024-02-19 RX ADMIN — MELATONIN TAB 3 MG 6 MG: 3 TAB at 11:02

## 2024-02-19 RX ADMIN — AZITHROMYCIN MONOHYDRATE 500 MG: 500 INJECTION, POWDER, LYOPHILIZED, FOR SOLUTION INTRAVENOUS at 09:02

## 2024-02-19 RX ADMIN — VANCOMYCIN HYDROCHLORIDE 2000 MG: 1 INJECTION, POWDER, LYOPHILIZED, FOR SOLUTION INTRAVENOUS at 04:02

## 2024-02-19 RX ADMIN — APIXABAN 5 MG: 2.5 TABLET, FILM COATED ORAL at 09:02

## 2024-02-19 RX ADMIN — INSULIN ASPART 1 UNITS: 100 INJECTION, SOLUTION INTRAVENOUS; SUBCUTANEOUS at 09:02

## 2024-02-19 RX ADMIN — CEFTRIAXONE SODIUM 2 G: 2 INJECTION, POWDER, FOR SOLUTION INTRAMUSCULAR; INTRAVENOUS at 08:02

## 2024-02-19 RX ADMIN — APIXABAN 5 MG: 2.5 TABLET, FILM COATED ORAL at 08:02

## 2024-02-19 RX ADMIN — IPRATROPIUM BROMIDE AND ALBUTEROL SULFATE 3 ML: .5; 2.5 SOLUTION RESPIRATORY (INHALATION) at 02:02

## 2024-02-19 RX ADMIN — INSULIN ASPART 2 UNITS: 100 INJECTION, SOLUTION INTRAVENOUS; SUBCUTANEOUS at 04:02

## 2024-02-19 RX ADMIN — ACETAMINOPHEN 650 MG: 325 TABLET ORAL at 11:02

## 2024-02-19 RX ADMIN — SPIRONOLACTONE 25 MG: 25 TABLET ORAL at 08:02

## 2024-02-19 RX ADMIN — THERA TABS 1 TABLET: TAB at 08:02

## 2024-02-19 RX ADMIN — ACETAMINOPHEN 650 MG: 325 TABLET ORAL at 01:02

## 2024-02-19 RX ADMIN — METHYLPREDNISOLONE SODIUM SUCCINATE 40 MG: 40 INJECTION, POWDER, FOR SOLUTION INTRAMUSCULAR; INTRAVENOUS at 12:02

## 2024-02-19 RX ADMIN — INSULIN ASPART 2 UNITS: 100 INJECTION, SOLUTION INTRAVENOUS; SUBCUTANEOUS at 12:02

## 2024-02-19 RX ADMIN — GABAPENTIN 800 MG: 400 CAPSULE ORAL at 04:02

## 2024-02-19 RX ADMIN — BUDESONIDE 0.5 MG: 0.5 INHALANT RESPIRATORY (INHALATION) at 07:02

## 2024-02-19 RX ADMIN — METHYLPREDNISOLONE SODIUM SUCCINATE 40 MG: 40 INJECTION, POWDER, FOR SOLUTION INTRAMUSCULAR; INTRAVENOUS at 09:02

## 2024-02-19 RX ADMIN — GABAPENTIN 800 MG: 400 CAPSULE ORAL at 08:02

## 2024-02-19 RX ADMIN — ARFORMOTEROL TARTRATE 15 MCG: 15 SOLUTION RESPIRATORY (INHALATION) at 07:02

## 2024-02-19 RX ADMIN — FUROSEMIDE 40 MG: 10 INJECTION, SOLUTION INTRAMUSCULAR; INTRAVENOUS at 12:02

## 2024-02-19 NOTE — H&P
[facility] Medicine  History & Physical    Patient Name: Nelly Tian  MRN: [id number]  Patient Class: IP- Inpatient  Admission Date: 2/19/2024  Attending Physician: Toma Goins MD   Primary Care Provider: Constantine Bird MD         Patient information was obtained from patient, past medical records, and ER records.     Subjective:     Principal Problem:Acute on chronic respiratory failure with hypoxia and hypercapnia    Chief Complaint:   Chief Complaint   Patient presents with    Cough     And abdominal pain for 1-2 hours        HPI: Nelly Tian is a 73 year old female with past medical history of AFib/a flutter on Eliquis, CHF, COPD, diabetes mellitus, recurrent abdominal cellulitis, hypertension, iron-deficiency anemia, hyperlipidemia, MI, anxiety, and arthritis who presents with shortness on breath associated with cough and lower abdominal pain for a couple hours.  She denies chest pain, fever, chills, dizziness, lightheadedness, nausea or vomiting.  ED workup significant for CT Chest large left and small right lung concerning for pneumonia, CT abdomen/pelvis chronic large abdominal wall hernia containing numerous organs without obstruction.  ECG atrial fibrillation with controlled rate, trop 0.011.  She is febrile 100.6, WBC 12.8, lactic 2.4, procal 0.04, and .  COVID/flu negative.  While in ED she was given DuoNebs, steroids, antibiotics, and placed on continuous BiPAP, ABGs improved pH 7.42, pCO2 47.2, PO2 89, pHCO3 30.8.  Admitted to hospital medicine for further management and treatment.          Past Medical History:   Diagnosis Date    *Atrial flutter     Angina pectoris 9/18/2017    Anxiety     Arthritis     Asthma     Atrial fibrillation     Back pain     Cataract     OD    Chest pain 9/17/2017    CHF (congestive heart failure)     Chronically on benzodiazepine therapy 5/4/2019    COPD (chronic obstructive pulmonary disease)     Depression     Diabetes  mellitus     Emphysema of lung     Heart failure     Hepatomegaly 2/3/2016    Hernia     History of MI (myocardial infarction) 2016    Hypercapnic respiratory failure, chronic 2016    Hyperlipidemia     Hypertension     Iron deficiency anemia 2/3/2016    Myocardial infarction     Obesity     Peripheral vascular disease     Pneumonia     Polyneuropathy     Retinal detachment     OS    Septic shock 2017    Skin ulcer 3/18/2017    Tobacco dependence     Type II 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, not stated as uncontrolled(250.60)        Past Surgical History:   Procedure Laterality Date    BREAST BIOPSY Left 10 yrs ago    benign    CATARACT EXTRACTION Left     OS     CATARACT EXTRACTION W/  INTRAOCULAR LENS IMPLANT Right 2023    Procedure: CEIOL OD;  Surgeon: David Bautista MD;  Location: [facility] OR;  Service: Ophthalmology;  Laterality: Right;     SECTION      x2    EYE SURGERY      HYSTERECTOMY      partial    OOPHORECTOMY      one ovary conserved    RETINAL DETACHMENT SURGERY      buckle --OS    TONSILLECTOMY         Review of patient's allergies indicates:   Allergen Reactions    Dilaudid [hydromorphone] Anaphylaxis     Other reaction(s): Anaphylaxis  Other reaction(s): Unknown    Zyvox [linezolid in dextrose 5%] Shortness Of Breath       No current facility-administered medications on file prior to encounter.     Current Outpatient Medications on File Prior to Encounter   Medication Sig    albuterol (PROVENTIL/VENTOLIN HFA) 90 mcg/actuation inhaler INHALE 2 PUFFS EVERY 6 HOURS AS NEEDED FOR WHEEZING (RESCUE)    ascorbic acid, vitamin C, (VITAMIN C) 500 MG tablet Take 2 tablets (1,000 mg total) by mouth every evening.    atorvastatin (LIPITOR) 10 MG tablet TAKE 1 TABLET(10 MG) BY MOUTH EVERY OTHER DAY    blood sugar diagnostic Strp To check BG 2 times daily, to use with insurance preferred meter    blood-glucose meter kit To check BG 2 times daily,  to use with insurance preferred meter    cefadroxil (DURICEF) 500 MG Cap Take 1 capsule (500 mg total) by mouth every 12 (twelve) hours.    ELIQUIS 5 mg Tab TAKE 1 TABLET TWICE DAILY    empagliflozin (JARDIANCE) 25 mg tablet Take 1 tablet (25 mg total) by mouth once daily.    fluconazole (DIFLUCAN) 100 MG tablet Take 2 tablets (200 mg total) by mouth once a week.    fluticasone propionate (FLONASE) 50 mcg/actuation nasal spray SHAKE LIQUID AND USE 1 SPRAY IN EACH NOSTRIL EVERY DAY    furosemide (LASIX) 40 MG tablet Take 1 tablet (40 mg total) by mouth once daily.    gabapentin (NEURONTIN) 800 MG tablet TAKE 1 TABLET(800 MG) BY MOUTH THREE TIMES DAILY    HYDROcodone-acetaminophen (NORCO) 7.5-325 mg per tablet Take 1 tablet by mouth every 12 (twelve) hours as needed for Pain. Medical necessary for greater than 7 days for chronic pain.    Lactobacillus acidophilus 1 billion cell Cap Take 1 capsule by mouth 3 (three) times daily with meals.    lancets Misc To check BG 2 times daily, to use with insurance preferred meter    LIDOcaine (LIDODERM) 5 % APPLY PATCH ONTO THE SKIN. REMOVE AND DISCARD PATCH WITHIN 12 HOURS OR AS DIRECTED BY MD    lisinopriL (PRINIVIL,ZESTRIL) 20 MG tablet     melatonin 10 mg Tab Take 10 mg by mouth nightly.    metFORMIN (GLUCOPHAGE) 500 MG tablet TAKE 1 TABLET EVERY DAY WITH BREAKFAST    multivitamin (THERAGRAN) tablet Take 1 tablet by mouth once daily.    nystatin (NYSTOP) powder APPLY TOPICALLY TO THE AFFECTED AREA TWICE DAILY    nystatin-triamcinolone (MYCOLOG II) cream Apply topically 3 (three) times daily. Apply to affected area    ondansetron (ZOFRAN-ODT) 8 MG TbDL Take 1 tablet (8 mg total) by mouth every 12 (twelve) hours as needed.    potassium chloride SA (K-DUR,KLOR-CON) 20 MEQ tablet Take 1 tablet (20 mEq total) by mouth once daily.    spironolactone (ALDACTONE) 25 MG tablet Take 1 tablet (25 mg total) by mouth once daily.    TRELEGY ELLIPTA 100-62.5-25 mcg DsDv INHALE 1 PUFF EVERY  DAY. EXPIRES 42 DAYS AFTER OPENING FOIL TRAY     Family History       Problem Relation (Age of Onset)    Alcohol abuse Mother    Arrhythmia Father    Arthritis Father, Sister    Blindness Son    Cancer Brother    Cirrhosis Mother    Coronary artery disease Father, Sister    Crohn's disease Sister    Early death Sister (30)    Heart attack Father, Sister    Heart disease Father, Sister (32), Sister    Lung cancer Brother    No Known Problems Brother, Daughter          Tobacco Use    Smoking status: Former     Current packs/day: 0.00     Average packs/day: 0.3 packs/day for 54.4 years (16.3 ttl pk-yrs)     Types: Cigarettes     Start date: 1968     Quit date: 2022     Years since quittin.6    Smokeless tobacco: Never   Substance and Sexual Activity    Alcohol use: No     Alcohol/week: 0.0 standard drinks of alcohol    Drug use: No    Sexual activity: Not Currently     Review of Systems   Constitutional:  Positive for fever.   Respiratory:  Positive for cough and shortness of breath. Negative for wheezing.    Cardiovascular:  Negative for chest pain and leg swelling.   Gastrointestinal:  Positive for abdominal pain and constipation. Negative for abdominal distention, nausea and vomiting.        Large/obese abdomen     Genitourinary:  Negative for difficulty urinating.   Musculoskeletal:  Positive for arthralgias.   Neurological:  Negative for dizziness, light-headedness and headaches.     Objective:     Vital Signs (Most Recent):  Temp: 98.9 °F (37.2 °C) (24 0217)  Pulse: 87 (24 05)  Resp: (!) 27 (24 0558)  BP: (!) 133/59 (24)  SpO2: (!) 94 % (24) Vital Signs (24h Range):  Temp:  [98.9 °F (37.2 °C)-100.6 °F (38.1 °C)] 98.9 °F (37.2 °C)  Pulse:  [] 87  Resp:  [27-32] 27  SpO2:  [91 %-100 %] 94 %  BP: (113-133)/(56-66) 133/59     Weight: 113.4 kg (250 lb)  Body mass index is 38.01 kg/m².     Physical Exam  Vitals and nursing note reviewed.   Constitutional:        General: She is not in acute distress.     Appearance: Normal appearance. She is obese. She is not ill-appearing.   Cardiovascular:      Rate and Rhythm: Normal rate. Rhythm irregular.      Pulses: Normal pulses.      Heart sounds: Normal heart sounds.   Pulmonary:      Effort: Pulmonary effort is normal. No respiratory distress.      Breath sounds: Wheezing and rhonchi present.   Abdominal:      General: Bowel sounds are normal. There is no distension.      Palpations: Abdomen is soft.      Tenderness: There is no abdominal tenderness.   Musculoskeletal:      Right lower leg: No edema.      Left lower leg: No edema.   Skin:     General: Skin is warm.   Neurological:      Mental Status: She is oriented to person, place, and time. Mental status is at baseline.   Psychiatric:         Mood and Affect: Mood normal.                Significant Labs: All pertinent labs within the past 24 hours have been reviewed.    BMP:   Recent Labs   Lab 02/19/24  0145   *      K 4.7      CO2 24   BUN 15   CREATININE 0.6   CALCIUM 9.0   MG 2.0     CBC:   Recent Labs   Lab 02/19/24  0145   WBC 12.86*   HGB 12.9   HCT 41.6        CMP:   Recent Labs   Lab 02/19/24  0145      K 4.7      CO2 24   *   BUN 15   CREATININE 0.6   CALCIUM 9.0   PROT 7.1   ALBUMIN 3.3*   BILITOT 0.6   ALKPHOS 89   AST 25   ALT 21   ANIONGAP 10     Cardiac Markers:   Recent Labs   Lab 02/19/24  0145   *       Magnesium:   Recent Labs   Lab 02/19/24  0145   MG 2.0       Troponin:   Recent Labs   Lab 02/19/24  0145   TROPONINI 0.011       Urine Studies:   Recent Labs   Lab 02/19/24  0225   COLORU Yellow   APPEARANCEUA Clear   PHUR 6.0   SPECGRAV 1.020   PROTEINUA Negative   GLUCUA 4+*   KETONESU Negative   BILIRUBINUA Negative   OCCULTUA Trace*   NITRITE Negative   UROBILINOGEN Negative   LEUKOCYTESUR Negative   RBCUA 1   WBCUA 2   BACTERIA None   SQUAMEPITHEL 7     Recent Labs     02/19/24  0347   PH 7.423    PCO2 47.2*   PO2 89   HCO3 30.8*   POCSATURATED 97   BE 6*       Significant Imaging: I have reviewed all pertinent imaging results/findings within the past 24 hours.  PROCEDURE INFORMATION:   Exam: CT Chest Without Contrast; Diagnostic   Exam date and time: 2/19/2024 3:06 AM Age: 73 years old Clinical indication: Cough and shortness of breath; Patient HX: Cough and SOB for a few days. HX of copd TECHNIQUE: Imaging protocol: Diagnostic computed tomography of the chest without contrast. 3D rendering (Not supervised by radiologist): MIP and/or 3D reconstructed images were created by the technologist. Radiation optimization: All CT scans at this facility use at least one of these dose optimization techniques: automated exposure control; mA and/or kV adjustment per patient size (includes targeted exams where dose is matched to clinical indication); or iterative reconstruction. COMPARISON: DX XR CHEST AP PORTABLE 2/19/2024 1:22 AM FINDINGS: Lungs: Large left and small right lung consolidation suspicious for pneumonia. Pleural spaces: Trace right pleural effusion. Heart: Heart is moderately enlarged. Lymph nodes: Unremarkable. No enlarged lymph nodes. Vasculature: Unremarkable. No aortic aneurysm. Bones/joints: No acute fracture. Soft tissues: Large left abdominal hernia, incompletely visualized. IMPRESSION: Large left and small right lung consolidation suspicious for pneumonia. Dictated and Authenticated by: Domenico Rodriguez MD 02/19/2024 4:22 AM Central Time ([place] & [place])    PROCEDURE INFORMATION:   Exam: CT Abdomen And Pelvis Without Contrast   Exam date and time: 2/19/2024 3:06 AM Age: 73 years old Clinical indication: Generalized; Patient HX: Abdominal pain for several hours. TECHNIQUE: Imaging protocol: Computed tomography of the abdomen and pelvis without contrast. Radiation optimization: All CT scans at this facility use at least one of these dose optimization techniques: automated exposure control; mA and/or kV  adjustment per patient size (includes targeted exams where dose is matched to clinical indication); or iterative reconstruction. COMPARISON: CT ABDOMEN PELVIS WITHOUT CONTRAST 5/4/2023 10:45 AM FINDINGS: Lungs: There is prominent consolidation at the left lung base. Pleural spaces: Trace right pleural effusion and small consolidation at the right lung base. Liver: Normal. No mass. Gallbladder and bile ducts: Stone is present within the gallbladder. Pancreas: Normal. No ductal dilation. Spleen: Normal. No splenomegaly. Adrenal glands: Normal. No mass. Kidneys and ureters: Calcification of the bilateral renal papillae may suggest chronic papillary necrosis versus stones. Stomach and bowel: Visualized bowel appears nondistended without evidence of obstruction. Majority of the bowel is herniated into the left chest/abdominal wall hernia. Appendix: No evidence of appendicitis. Intraperitoneal space: Unremarkable. No free air. No significant fluid collection. Vasculature: Unremarkable. No abdominal aortic aneurysm. Lymph nodes: Unremarkable. No enlarged lymph nodes. Urinary bladder: The bladder is normal. Reproductive: Unremarkable as visualized. Bones/joints: Unremarkable. No acute fracture. Soft tissues: There is large lateral left abdominal wall defect with herniation of the abdominal contents, partially excluded from view. Spleen is herniated into the lateral abdominal hernia. Left kidney is partially herniated into the lateral abdominal wall hernia. NYLA GIBBS Accession: [id number] MRN: [id number]  Preliminary Radiology Report  (QA) DISCREPANCY? If there is a discrepancy between the preliminary and final interpretation, please notify [facility] via [email or web address]. If you do not have access to our QA portal, call our QA team at [phone] CONFIDENTIALITY STATEMENT This report is intended only for the use of the referring physician, and only in accordance with law, If you received this in error,  call [phone] Page 2 of 2 IMPRESSION: 1. Prominent left lung base consolidation suspicious for pneumonia. 2. Large partially visualized lateral abdominal wall hernia containing numerous organs without evidence for obstruction. Appearance similar to prior. 3. Interval development of calcification of the bilateral renal papilla; correlate for papillary necrosis versus renal stones. Dictated and Authenticated by: Domenico Rodriguez MD 02/19/2024 4:20 AM Central Time ([place] & [place])    Assessment/Plan:     * Acute on chronic respiratory failure with hypoxia and hypercapnia  Patient with Hypercapnic and Hypoxic Respiratory failure which is Acute on chronic.  she is not on home oxygen. Supplemental oxygen was provided and noted- Oxygen Concentration (%):  [40] 40    .   Signs/symptoms of respiratory failure include- tachypnea, increased work of breathing, and respiratory distress. Contributing diagnoses includes - CHF, COPD, and Pneumonia Labs and images were reviewed. Patient Has recent ABG, which has been reviewed. Will treat underlying causes and adjust management of respiratory failure as follows-     Continuous BiPAP  DuoNebs, steroids, and antibiotics       Pneumonia  Patient with current diagnosis of pneumonia I have reviewed the pertinent imaging. Current antimicrobial regimen consists of   Antibiotics (From admission, onward)      Start     Stop Route Frequency Ordered    02/19/24 1000  piperacillin-tazobactam (ZOSYN) 4.5 g in dextrose 5 % in water (D5W) 100 mL IVPB (MB+)         -- IV Every 8 hours (non-standard times) 02/19/24 0452    02/19/24 0552  vancomycin - pharmacy to dose  (vancomycin IVPB (PEDS and ADULTS))        See Hyperspace for full Linked Orders Report.    -- IV pharmacy to manage frequency 02/19/24 0453    02/19/24 0400  vancomycin (VANCOCIN) 2,000 mg in dextrose 5 % (D5W) 500 mL IVPB         02/19/24 1559 IV ED 1 Time 02/19/24 0349        . Cultures drawn and noted-   Microbiology Results (last 7  "days)       Procedure Component Value Units Date/Time    Blood culture x two cultures. Draw prior to antibiotics. [[id number]] Collected: 02/19/24 0145    Order Status: Sent Specimen: Blood from Peripheral, Forearm, Right Updated: 02/19/24 0146    Blood culture x two cultures. Draw prior to antibiotics. [[id number]] Collected: 02/19/24 0146    Order Status: Sent Specimen: Blood from Peripheral, Forearm, Left Updated: 02/19/24 0146    Influenza A & B by Molecular [[id number]] Collected: 02/19/24 0120    Order Status: Completed Specimen: Nasopharyngeal Swab Updated: 02/19/24 0142     Influenza A, Molecular Negative     Influenza B, Molecular Negative     Flu A & B Source NP         Will monitor patient closely and continue current treatment plan unchanged.        Flank hernia  Chronic condition note  Analgesic and antiemetics ordered   CT abdomen/pelvis chronic large abdominal wall hernia containing numerous organs without obstruction      COPD (chronic obstructive pulmonary disease)  Patient's COPD is with exacerbation noted by worsening of baseline hypoxia currently.  Patient is currently off COPD Pathway. Continue scheduled inhalers Steroids, Antibiotics, and Supplemental oxygen and monitor respiratory status closely.     Type 2 diabetes mellitus with diabetic neuropathy, without long-term current use of insulin  Patient's FSGs are controlled on current medication regimen.  Last A1c reviewed-   Lab Results   Component Value Date    HGBA1C 5.4 04/27/2023     Most recent fingerstick glucose reviewed- No results for input(s): "POCTGLUCOSE" in the last 24 hours.  Current correctional scale  Medium  Maintain anti-hyperglycemic dose as follows-   Antihyperglycemics (From admission, onward)      Start     Stop Route Frequency Ordered    02/19/24 0538  insulin aspart U-100 pen 0-10 Units         -- SubQ Before meals & nightly PRN 02/19/24 0459          Hold Oral hypoglycemics while patient is in the " "hospital.        Chronic atrial fibrillation  Patient with Permanent atrial fibrillation which is controlled currently with Beta Blocker. Patient is currently in atrial fibrillation.[id number] Score: 4.  . Anticoagulation indicated. Anticoagulation done with Eliquis .      VTE Risk Mitigation (From admission, onward)           Ordered     apixaban tablet 5 mg  2 times daily         02/19/24 0455     IP VTE HIGH RISK PATIENT  Once         02/19/24 0455     Place sequential compression device  Until discontinued         02/19/24 0455                               Patient is on Zosyn 4.5 g IV Q6h over 30 minutes (intermittent infusion). Due to the patient's current diagnosis, zosyn via extended infusion provides better clinical outcomes regarding decreased mortality and decreased length of stay in comparison to intermittent infusion. Zosyn dose will be adjusted to Zosyn 4.5 g IV Q8h over 4 hours. Per pharmacy protocol, Zosyn can be adjusted automatically. Providers can override adjustment by re-ordering intermittent infusion with "dispense as written" in the administration instructions.     Luciano Purcell  [phone]      Evelyn Townsend DNP  Department of Hospital Medicine  [facility]           "

## 2024-02-19 NOTE — ASSESSMENT & PLAN NOTE
Chronic condition note  Analgesic and antiemetics ordered   CT abdomen/pelvis chronic large abdominal wall hernia containing numerous organs without obstruction

## 2024-02-19 NOTE — CARE UPDATE
02/19/24 0703   Patient Assessment/Suction   Level of Consciousness (AVPU) alert   Respiratory Effort Mild;Shallow   Expansion/Accessory Muscles/Retractions abdominal muscle use   All Lung Fields Breath Sounds Anterior:;Lateral:;coarse   Rhythm/Pattern, Respiratory tachypneic   Skin Integrity   $ Wound Care Tech Time 15 min   Area Observed Bridge of nose   Skin Appearance redness blanchable;mil   Was wound care notified? 02/19/24   Barrier Changed?   (changing pt to under the nose mask for bipap)   PRE-TX-O2   Device (Oxygen Therapy) Oxymask   $ Is the patient on Low Flow Oxygen? Yes   Flow (L/min) 3   SpO2 (!) 92 %   Pulse Oximetry Type Continuous   $ Pulse Oximetry - Multiple Charge Pulse Oximetry - Multiple   Pulse 70   Resp (!) 24   Aerosol Therapy   $ Aerosol Therapy Charges Aerosol Treatment   Respiratory Treatment Status (SVN) given   Treatment Route (SVN) mask;oxygen   Patient Position (SVN) HOB elevated   Post Treatment Assessment (SVN) breath sounds unchanged   Signs of Intolerance (SVN) none   Breath Sounds Post-Respiratory Treatment   Throughout All Fields Post-Treatment All Fields   Throughout All Fields Post-Treatment no change   Post-treatment Heart Rate (beats/min) 70   Post-treatment Resp Rate (breaths/min) 24   Preset CPAP/BiPAP Settings   Mode Of Delivery Standby

## 2024-02-19 NOTE — SUBJECTIVE & OBJECTIVE
Interval History:  Productive cough, fever chills for around a week associated with shortness of breath, oxygen machine and nebulizer machine at home is not functioning.  Multiple family members at home also sick.  COVID-19 flu and RSV negative/     Review of Systems   Constitutional:  Negative for activity change and fever.   HENT:  Negative for ear discharge, facial swelling and sore throat.    Eyes:  Negative for photophobia, pain and visual disturbance.   Respiratory:  Positive for cough and shortness of breath. Negative for apnea.    Cardiovascular:  Negative for chest pain and leg swelling.   Gastrointestinal:  Negative for abdominal pain and blood in stool.   Endocrine: Negative for cold intolerance and heat intolerance.   Musculoskeletal:  Negative for back pain and gait problem.   Skin:  Negative for pallor and rash.   Neurological:  Negative for speech difficulty and headaches.   Psychiatric/Behavioral:  Negative for confusion, hallucinations and suicidal ideas.    All other systems reviewed and are negative.    Objective:     Vital Signs (Most Recent):  Temp: 98.7 °F (37.1 °C) (02/19/24 0823)  Pulse: 75 (02/19/24 0823)  Resp: 19 (02/19/24 0823)  BP: (!) 126/55 (02/19/24 0823)  SpO2: 97 % (02/19/24 0823) Vital Signs (24h Range):  Temp:  [98.7 °F (37.1 °C)-100.6 °F (38.1 °C)] 98.7 °F (37.1 °C)  Pulse:  [] 75  Resp:  [19-32] 19  SpO2:  [91 %-100 %] 97 %  BP: (113-133)/(55-66) 126/55     Weight: 132.6 kg (292 lb 5.3 oz)  Body mass index is 44.45 kg/m².  No intake or output data in the 24 hours ending 02/19/24 1234      Physical Exam  Constitutional:       Appearance: She is obese. She is ill-appearing.   HENT:      Head: Normocephalic and atraumatic.      Nose: Nose normal.   Eyes:      Extraocular Movements: Extraocular movements intact.      Pupils: Pupils are equal, round, and reactive to light.   Cardiovascular:      Rate and Rhythm: Normal rate and regular rhythm.      Heart sounds: No murmur  heard.  Pulmonary:      Effort: Pulmonary effort is normal.      Breath sounds: Normal breath sounds.   Abdominal:      General: Abdomen is flat.      Palpations: Abdomen is soft.      Hernia: A hernia is present.   Musculoskeletal:         General: Normal range of motion.      Cervical back: Normal range of motion and neck supple.   Skin:     General: Skin is warm and dry.   Neurological:      General: No focal deficit present.      Mental Status: She is alert and oriented to person, place, and time.   Psychiatric:         Mood and Affect: Mood normal.             Significant Labs: All pertinent labs within the past 24 hours have been reviewed.  CBC:   Recent Labs   Lab 02/19/24  0145   WBC 12.86*   HGB 12.9   HCT 41.6        CMP:   Recent Labs   Lab 02/19/24 0145      K 4.7      CO2 24   *   BUN 15   CREATININE 0.6   CALCIUM 9.0   PROT 7.1   ALBUMIN 3.3*   BILITOT 0.6   ALKPHOS 89   AST 25   ALT 21   ANIONGAP 10       Significant Imaging: X-Ray Chest AP Portable    Result Date: 2/19/2024  EXAMINATION: XR CHEST AP PORTABLE CLINICAL HISTORY: Sepsis; TECHNIQUE: Single frontal view of the chest was performed. COMPARISON: 06/22/2023 FINDINGS: There is airspace disease in both lower lung zones.  Enlarged cardiac silhouette.  Atherosclerosis.  Obscured left hemidiaphragm.  No significant pleural fluid.  No pneumothorax.     Bibasilar airspace disease worse on the left.  This could reflect atelectasis pneumonia aspiration or a combination there of. Electronically signed by: Greg Samuels Date:    02/19/2024 Time:    08:53    CT Chest Without Contrast    Result Date: 2/19/2024  EXAMINATION: CT CHEST WITHOUT CONTRAST CLINICAL HISTORY: Pulmonary embolism (PE) suspected, high prob; TECHNIQUE: Low dose axial images, sagittal and coronal reformations were obtained from the thoracic inlet to the lung bases. Contrast was not administered. COMPARISON: None. FINDINGS: Respiratory motion artifact in the  lungs limits fine detail. There is a 2.2 cm nodular focal airspace opacity in the inferior right upper lobe series 6, image 196.  There is dependent atelectasis right middle and lower lobes.  There is diffuse airspace disease through the left lower lobe.  Right pleural fluid.  Enlarged heart with calcification of the aortic valve and coronary arteries.  9 mm hypodense lesion in the right thyroid gland.  Multiple enlarged lymph nodes in the mediastinum with a reference measuring 1.5 cm in the precarinal space series 2 image 44.  Hyperdensity along the left renal collecting system is presumably early excretion of contrast.  Partially imaged large left ventral abdominal hernia.  Multilevel spinal degenerative changes.     1. Patchy airspace disease left lower lobe suspicious for aspiration or pneumonia. 2. Nodular opacity in the right upper lobe could reflect additional sequela of the pneumonia/aspiration, although additional follow-up is recommended with CT in 3 months to exclude underlying neoplasm. 3. Mediastinal adenopathy is presumably reactive.  Recommend attention on follow-up imaging. 4. Enlarged heart with coronary artery disease. 5. Right thyroid nodule or cyst could be further characterized with elective ultrasound as clinically warranted. Electronically signed by: Greg Samuels Date:    02/19/2024 Time:    08:23    CT Abdomen Pelvis  Without Contrast    Result Date: 2/19/2024  EXAMINATION: CT ABDOMEN PELVIS WITHOUT CONTRAST CLINICAL HISTORY: LLQ abdominal pain; TECHNIQUE: Low dose axial images, sagittal and coronal reformations were obtained from the lung bases to the pubic symphysis.  Oral contrast was not administered. COMPARISON: 05/04/2023 FINDINGS: There is atelectasis and small volume pleural fluid at the right lung base.  There is diffuse patchy airspace disease at the left lung base.  Heart size is enlarged with calcified coronary artery plaque.  The gallbladder is minimally distended and there is  a stone in the lumen which measures 1.5 cm. There is hyperdensity along the renal papilla at multiple sites in each kidney.  Pancreas atrophy.  Evidence of remote infarct or trauma along the spleen.  Remaining solid abdominal organs are unremarkable. There is no enteric contrast which limits bowel assessment.  No dilated bowel loops.  Scattered colonic diverticula. There is a large left lateral abdominal wall hernia containing spleen pancreas tail part of the stomach and multiple nondilated loops of colon and small bowel.  The left kidney and tip of the left hepatic lobe extend to the neck of the defect.  Craniocaudad length of the hernia is 23 cm.  There is excretion of contrast in the urinary bladder and unremarkable uterus. Bilateral L5 pars defects.  There is 2 mm retrolisthesis L4 on L5 and 10 mm anterolisthesis L5 on S1.  Advanced multilevel degenerative changes in the lumbar spine worst at L5-S1.  At least moderate degenerative change in the right hip joint.     1. Left lower lobe airspace disease suspicious for pneumonia or aspiration. 2. Cardiomegaly with coronary artery disease. 3. Cholelithiasis. 4. Hyperdensity along the renal papillae is likely from early excretion of contrast. 5. Large left ventral hernia containing multiple abdominal organs.  No evidence for bowel obstruction.  This is a chronic finding. Electronically signed by: Greg Samuels Date:    02/19/2024 Time:    08:16  - pulls last radiology orders

## 2024-02-19 NOTE — RESPIRATORY THERAPY
VBG obtained and results given to Dr. Kevin. Per MD order placed pt on BIPAP   Latest Reference Range & Units 02/19/24 01:40   POC PH 7.35 - 7.45  7.309 (L)   POC PCO2 35 - 45 mmHg 68.5 (H)   POC PO2 40 - 60 mmHg 17 (LL)   POC HCO3 24 - 28 mmol/L 34.4 (H)   POC SATURATED O2 95 - 100 % 20   Sample  VENOUS   POC TCO2 24 - 29 mmol/L 36 (H)   POC BE -2 to 2 mmol/L 8 (H)   FiO2  36   Flow  4   DelSys  Nasal Can   Site  Other   Mode  SPONT   POC Lactate 0.5 - 2.2 mmol/L 2.47 (H)   (LL): Data is critically low  (L): Data is abnormally low  (H): Data is abnormally high

## 2024-02-19 NOTE — ASSESSMENT & PLAN NOTE
Patient with Hypercapnic and Hypoxic Respiratory failure which is Acute on chronic.  she is not on home oxygen. Supplemental oxygen was provided and noted- Oxygen Concentration (%):  [24-40] 40    .   Signs/symptoms of respiratory failure include- tachypnea, increased work of breathing, and respiratory distress. Contributing diagnoses includes - CHF, COPD, and Pneumonia Labs and images were reviewed. Patient Has recent ABG, which has been reviewed. Will treat underlying causes and adjust management of respiratory failure as follows-     Continuous BiPAP  DuoNebs, steroids, and antibiotics     Patient oxygen machine and nebulizer machine was not functioning at home, discussed with case management we will request this upon discharge.

## 2024-02-19 NOTE — PROGRESS NOTES
Nelly Tian [id number] is a 73 y.o. female who had been consulted for vancomycin dosing.    Vancomycin has been discontinued.  Pharmacy consult for vancomycin dosing in no longer required.      Thank you for allowing us to participate in this patient's care.     Halina Clemons

## 2024-02-19 NOTE — ED NOTES
Straight cath done using sterile procedure, tolerated well. Urine sent to lab.   350ml of yellow urine returned.

## 2024-02-19 NOTE — ASSESSMENT & PLAN NOTE
"Patient's FSGs are controlled on current medication regimen.  Last A1c reviewed-   Lab Results   Component Value Date    HGBA1C 5.4 04/27/2023     Most recent fingerstick glucose reviewed- No results for input(s): "POCTGLUCOSE" in the last 24 hours.  Current correctional scale  Medium  Maintain anti-hyperglycemic dose as follows-   Antihyperglycemics (From admission, onward)      Start     Stop Route Frequency Ordered    02/19/24 0538  insulin aspart U-100 pen 0-10 Units         -- SubQ Before meals & nightly PRN 02/19/24 0455          Hold Oral hypoglycemics while patient is in the hospital.      "

## 2024-02-19 NOTE — ED NOTES
"Pt in with c/o cough and SOB states she is supposed to wear 2L O2 cont but the concentrator she has at home stopped working "a few days ago." Pt states her family called the company and they advised they were closed and she should report to the ED for assistance. Pt c/o cough and SOB and LLQ pain. Pt has large hernia to LLQ, healed wound. Pt states all family members have recently been ill with an URI. Pt states she is also constipated because she has not taken her laxative in a couple of days. Last BM was 4 days ago.   "

## 2024-02-19 NOTE — ED PROVIDER NOTES
Encounter Date: 2024       History     Chief Complaint   Patient presents with    Cough     And abdominal pain for 1-2 hours     73 Female multiple comorbidities presents with cough and congestion.  Couple of days, associated fever and abdominal pain.  Brought in by EMS.  Associated generalized shaking.  No active chest pain    The history is provided by the patient and the EMS personnel.     Review of patient's allergies indicates:   Allergen Reactions    Dilaudid [hydromorphone] Anaphylaxis     Other reaction(s): Anaphylaxis  Other reaction(s): Unknown    Zyvox [linezolid in dextrose 5%] Shortness Of Breath     Past Medical History:   Diagnosis Date    *Atrial flutter     Angina pectoris 2017    Anxiety     Arthritis     Asthma     Atrial fibrillation     Back pain     Cataract     OD    Chest pain 2017    CHF (congestive heart failure)     Chronically on benzodiazepine therapy 2019    COPD (chronic obstructive pulmonary disease)     Depression     Diabetes mellitus     Emphysema of lung     Heart failure     Hepatomegaly 2/3/2016    Hernia     History of MI (myocardial infarction) 2016    Hypercapnic respiratory failure, chronic 2016    Hyperlipidemia     Hypertension     Iron deficiency anemia 2/3/2016    Myocardial infarction     Obesity     Peripheral vascular disease     Pneumonia     Polyneuropathy     Retinal detachment     OS    Septic shock 2017    Skin ulcer 3/18/2017    Tobacco dependence     Type II 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, not stated as uncontrolled(250.60)      Past Surgical History:   Procedure Laterality Date    BREAST BIOPSY Left 10 yrs ago    benign    CATARACT EXTRACTION Left     OS     CATARACT EXTRACTION W/  INTRAOCULAR LENS IMPLANT Right 2023    Procedure: CEIOL OD;  Surgeon: David Bautista MD;  Location: [facility];  Service: Ophthalmology;  Laterality: Right;     SECTION      x2    EYE SURGERY       HYSTERECTOMY      partial    OOPHORECTOMY      one ovary conserved    RETINAL DETACHMENT SURGERY      buckle --OS    TONSILLECTOMY       Family History   Problem Relation Age of Onset    Alcohol abuse Mother     Cirrhosis Mother     Arthritis Father     Heart disease Father     Heart attack Father     Arrhythmia Father     Coronary artery disease Father     Coronary artery disease Sister     Early death Sister 30        heart disease    Heart disease Sister 32        MI    Heart attack Sister     Arthritis Sister     Heart disease Sister     Crohn's disease Sister     Cancer Brother         Lung cancer    Lung cancer Brother     No Known Problems Brother     No Known Problems Daughter     Blindness Son         due to accident//    Breast cancer Neg Hx     Glaucoma Neg Hx     Macular degeneration Neg Hx     Retinal detachment Neg Hx     Amblyopia Neg Hx     Diabetes Neg Hx     Cataracts Neg Hx     Hypertension Neg Hx     Strabismus Neg Hx     Stroke Neg Hx     Thyroid disease Neg Hx      Social History     Tobacco Use    Smoking status: Former     Current packs/day: 0.00     Average packs/day: 0.3 packs/day for 54.4 years (16.3 ttl pk-yrs)     Types: Cigarettes     Start date: 1968     Quit date: 2022     Years since quittin.6    Smokeless tobacco: Never   Substance Use Topics    Alcohol use: No     Alcohol/week: 0.0 standard drinks of alcohol    Drug use: No     Review of Systems   All other systems reviewed and are negative.      Physical Exam     Initial Vitals   BP Pulse Resp Temp SpO2   24 0107 24 0107 24 0107 24 0107 24 0128   (P) 125/66 (P) 110 (!) 28 (!) (P) 100.6 °F (38.1 °C) (!) 91 %      MAP       --                Physical Exam    Nursing note and vitals reviewed.  Constitutional:   Patient appears acute on chronically ill   Eyes: Right eye exhibits no discharge. Left eye exhibits no discharge.   Cardiovascular:            Tachycardic, irregular rhythm    Pulmonary/Chest: No stridor.   On 2-1/2 L nasal cannula, wheezing bilaterally   Abdominal:   Large hernia left side of abdomen, minimal tenderness to palpation left side of abdomen     Neurological: She is alert.   Skin: Skin is warm.         ED Course   Critical Care    Date/Time: 2/19/2024 12:57 AM    Performed by: Mehran Kevin Jr., DO  Authorized by: Mehran Kevin Jr., DO  Direct patient critical care time: 25 minutes  Ordering / reviewing critical care time: 8 minutes  Documentation critical care time: 8 minutes  Total critical care time (exclusive of procedural time) : 41 minutes  Critical care was necessary to treat or prevent imminent or life-threatening deterioration of the following conditions: respiratory failure and sepsis.        Labs Reviewed   CBC W/ AUTO DIFFERENTIAL - Abnormal; Notable for the following components:       Result Value    WBC 12.86 (*)     MCV 99 (*)     MCHC 31.0 (*)     Gran # (ANC) 9.7 (*)     Immature Grans (Abs) 0.06 (*)     Mono # 1.5 (*)     Gran % 74.9 (*)     Lymph % 9.9 (*)     All other components within normal limits   COMPREHENSIVE METABOLIC PANEL - Abnormal; Notable for the following components:    Glucose 115 (*)     Albumin 3.3 (*)     All other components within normal limits   URINALYSIS, REFLEX TO URINE CULTURE - Abnormal; Notable for the following components:    Glucose, UA 4+ (*)     Occult Blood UA Trace (*)     All other components within normal limits    Narrative:     Specimen Source->Urine   B-TYPE NATRIURETIC PEPTIDE - Abnormal; Notable for the following components:     (*)     All other components within normal limits   ISTAT PROCEDURE - Abnormal; Notable for the following components:    POC PH 7.309 (*)     POC PCO2 68.5 (*)     POC PO2 17 (*)     POC HCO3 34.4 (*)     POC BE 8 (*)     POC Lactate 2.47 (*)     POC TCO2 36 (*)     All other components within normal limits   ISTAT PROCEDURE - Abnormal; Notable for the following components:     POC PCO2 47.2 (*)     POC HCO3 30.8 (*)     POC BE 6 (*)     POC TCO2 32 (*)     All other components within normal limits   INFLUENZA A & B BY MOLECULAR   CULTURE, BLOOD   CULTURE, BLOOD   MAGNESIUM   LIPASE   TROPONIN I   PROCALCITONIN   SARS-COV-2 RNA AMPLIFICATION, QUAL   RSV ANTIGEN DETECTION   AMMONIA   CK   URINALYSIS MICROSCOPIC    Narrative:     Specimen Source->Urine   LACTIC ACID, PLASMA          Imaging Results              CT Chest Without Contrast (In process)    Procedure changed from CTA Chest Non-Coronary (PE Studies)                    CT Abdomen Pelvis  Without Contrast (In process)    Procedure changed from CT Abdomen Pelvis With IV Contrast NO Oral Contrast                    X-Ray Chest AP Portable (In process)                      Medications   iohexoL (OMNIPAQUE 350) 350 mg iodine/mL injection (has no administration in time range)   iohexoL (OMNIPAQUE 350) 350 mg iodine/mL injection (has no administration in time range)   vancomycin (VANCOCIN) 2,000 mg in dextrose 5 % (D5W) 500 mL IVPB (2,000 mg Intravenous New Bag 2/19/24 0412)   piperacillin-tazobactam (ZOSYN) 3.375 g in dextrose 5 % in water (D5W) 100 mL IVPB (MB+) (0 g Intravenous Stopped 2/19/24 0237)   albuterol-ipratropium 2.5 mg-0.5 mg/3 mL nebulizer solution 3 mL (3 mLs Nebulization Given 2/19/24 0134)   acetaminophen tablet 650 mg (650 mg Oral Given 2/19/24 0118)   methylPREDNISolone sodium succinate injection 125 mg (125 mg Intravenous Given 2/19/24 0245)     Medical Decision Making  73-year-old female presents with cough and congestion and fever, associated left lower abdomen pain.  Workup initiated including sepsis protocol and administered broad-spectrum antibiotics vanc and Zosyn.  Workup demonstrated bilateral pneumonia worse on left side, initially hypercapnic respiratory failure which improved with breathing treatments, IV steroids and noninvasive BiPAP.  CT imaging with no surgical abdominal pathology.  Attempted to get  contrasted scan to rule out PE protocol as well as abdomen pelvic abnormality however due to IV complications timing was not appropriate and could not get a great contrasted scan.  Patient admitted to hospitalist team for further management evaluation    Amount and/or Complexity of Data Reviewed  Labs: ordered. Decision-making details documented in ED Course.  Radiology: ordered and independent interpretation performed.     Details: Concern for right-sided pneumonia per my read  Discussion of management or test interpretation with external provider(s): Spoke with Evelyn Townsend NP with hospitalist team will admit for further management and evaluation    Risk  OTC drugs.  Prescription drug management.  Decision regarding hospitalization.               ED Course as of 02/19/24 0428   Mon Feb 19, 2024   0145 POC PH(!): 7.309 [KB]   0145 POC PCO2(!): 68.5 [KB]   0145 POC Lactate(!): 2.47 [KB]   0145 Patient hypercapnic with acidosis, will place on noninvasive BiPAP [KB]   0145 This patient does have evidence of infective focus  My overall impression is sepsis.  Source: Respiratory  Antibiotics given- Antibiotics (72h ago, onward)    Start     Stop Route Frequency Ordered    02/19/24 0115  piperacillin-tazobactam (ZOSYN) 3.375 g in dextrose 5 % in   water (D5W) 100 mL IVPB (MB+)         02/19/24 1314 IV ED 1 Time 02/19/24 0112      Latest lactate reviewed-  @[email or web address](lactate,poclac)@  Organ dysfunction indicated by Acute respiratory failure    Fluid challenge Not needed - patient is not hypotensive      Post- resuscitation assessment No - Post resuscitation assessment not needed       Will Not start Pressors- Levophed for MAP of 65  Source control achieved by: Zosyn   [KB]   0147 RSV Source: Nasopharyngeal Swab [KB]   0147 RSV Ag by Molecular Method: Negative [KB]   0147 Influenza A, Molecular: Negative [KB]   0147 Influenza B, Molecular: Negative [KB]   0147 SARS-CoV-2 RNA, Amplification, Qual: Negative [KB]   3826  Ammonia: 49 [KB]   0223 BNP(!): 250 [KB]   0223 WBC(!): 12.86 [KB]   0223 Hemoglobin: 12.9 [KB]   0223 Hematocrit: 41.6 [KB]   0231 Magnesium : 2.0 [KB]   0232 Lipase: 17 [KB]   0232 CPK: 53 [KB]   0232 Sodium: 136 [KB]   0232 Potassium: 4.7 [KB]   0232 Chloride: 102 [KB]   0232 CO2: 24 [KB]   0232 Glucose(!): 115 [KB]   0232 BUN: 15 [KB]   0232 Creatinine: 0.6 [KB]   0232 PROTEIN TOTAL: 7.1 [KB]   0232 Calcium: 9.0 [KB]   0232 Albumin(!): 3.3 [KB]   0232 ALP: 89 [KB]   0232 AST: 25 [KB]   0232 ALT: 21 [KB]   0232 eGFR: >60 [KB]   0232 Anion Gap: 10 [KB]   0236 Troponin I: 0.011 [KB]   0236 Blood, UA(!): Trace [KB]   0236 NITRITE UA: Negative [KB]   0236 UROBILINOGEN UA: Negative [KB]   0236 Leukocyte Esterase, UA: Negative [KB]   0236 RBC, UA: 1 [KB]   0236 WBC, UA: 2 [KB]   0252 Procalcitonin: 0.04 [KB]   0350 POC PH: 7.423 [KB]   0351 POC PCO2(!): 47.2  Repeat gas improving [KB]   0419 COMPARISON: CT ABDOMEN PELVIS WITHOUT CONTRAST 5/4/2023 10:45 AM FINDINGS: Lungs: There is prominent consolidation at the left lung base. Pleural spaces: Trace right pleural effusion and small consolidation at the right lung base. Liver: Normal. No mass. Gallbladder and bile ducts: Stone is present within the gallbladder. Pancreas: Normal. No ductal dilation. Spleen: Normal. No splenomegaly. Adrenal glands: Normal. No mass. Kidneys and ureters: Calcification of the bilateral renal papillae may suggest chronic papillary necrosis versus stones. Stomach and bowel: Visualized bowel appears nondistended without evidence of obstruction. Majority of the bowel is herniated into the left chest/abdominal wall hernia. Appendix: No evidence of appendicitis. Intraperitoneal space: Unremarkable. No free air. No significant fluid collection. Vasculature: Unremarkable. No abdominal aortic aneurysm. Lymph nodes: Unremarkable. No enlarged lymph nodes. Urinary bladder: The bladder is normal. Reproductive: Unremarkable as visualized. Bones/joints:  Unremarkable. No acute fracture. Soft tissues: There is large lateral left abdominal wall defect with herniation of the abdominal contents, partially excluded from view. Spleen is herniated into the lateral abdominal hernia. Left kidney is partially herniated into the lateral abdominal wall hernia. Page 2 of 2 IMPRESSION: 1. Prominent left lung base consolidation suspicious for pneumonia. 2. Large partially visualized lateral abdominal wall hernia containing numerous organs without evidence for obstruction. Appearance similar to prior. 3. Interval development of calcification of the bilateral renal papilla; correlate for papillary necrosis versus renal stones.   [KB]      ED Course User Index  [KB] Mehran Kevin Jr.,                            Clinical Impression:  Final diagnoses:  [R00.0] Tachycardia  [J44.1] COPD exacerbation  [A41.9, R65.20] Severe sepsis (Primary)  [E87.29] Respiratory acidosis  [J18.9] Pneumonia of both lungs due to infectious organism, unspecified part of lung          ED Disposition Condition    Admit Stable                Mehran Kevin Jr., DO  02/19/24 0428

## 2024-02-19 NOTE — ASSESSMENT & PLAN NOTE
Patient's FSGs are controlled on current medication regimen.  Last A1c reviewed-   Lab Results   Component Value Date    HGBA1C 5.4 04/27/2023     Most recent fingerstick glucose reviewed-   Recent Labs   Lab 02/19/24  0813   POCTGLUCOSE 158*     Current correctional scale  Medium  Maintain anti-hyperglycemic dose as follows-   Antihyperglycemics (From admission, onward)    Start     Stop Route Frequency Ordered    02/19/24 1036  insulin aspart U-100 pen 0-10 Units         -- SubQ Before meals & nightly PRN 02/19/24 0937    02/19/24 0538  insulin aspart U-100 pen 0-10 Units         -- SubQ Before meals & nightly PRN 02/19/24 0455        Hold Oral hypoglycemics while patient is in the hospital.

## 2024-02-19 NOTE — RESPIRATORY THERAPY
02/19/24 0157   Preset CPAP/BiPAP Settings   Mode Of Delivery BiPAP S/T   $ CPAP/BiPAP Daily Charge BiPAP/CPAP Daily   $ Initial CPAP/BiPAP Setup? Yes   $ Is patient using? Yes   Size of Mask Small   Sized Appropriately? Yes   Equipment Type V60   Airway Device Type small full face mask   Ipap 12   EPAP (cm H2O) 6   Pressure Support (cm H2O) 6   Set Rate (Breaths/Min) 10   ITime (sec) 0.8   Rise Time (sec) 3   Patient CPAP/BiPAP Settings   RR Total (Breaths/Min) 29   Tidal Volume (mL) 632   VE Minute Ventilation (L/min) 18.3 L/min   Peak Inspiratory Pressure (cm H2O) 12   TiTOT (%) 35   Total Leak (L/Min) 17   CPAP/BiPAP Alarms   High Pressure (cm H2O) 30   Low Pressure (cm H2O) 5   Minute Ventilation (L/Min) 3   High RR (breaths/min) 40   Low RR (breaths/min) 10   Apnea (Sec) 20

## 2024-02-19 NOTE — ED NOTES
Pt asking to remove bipap for ct scan. Pt states it's making her anxious. Pt placed on O2 at 2L with sat of 93%

## 2024-02-19 NOTE — TELEPHONE ENCOUNTER
----- Message from Kia Diamond sent at 2/19/2024  9:11 AM CST -----  Contact: Pt's daughter  Type: Needs Medical Advice    Who Called:  Patient's daughter  What is this regarding?:  The pt was just admitted to the hospital. Over the weekend, oxygen machine stopped working.  They are needing orders for a new one or for them to come look at it BEFORE she is sent back home.   Best Call Back Number:   [phone] for Ms. Villavicencio  Additional Information:  Please call the patient's daughter back at the phone number listed above to advise. Thank you!

## 2024-02-19 NOTE — ASSESSMENT & PLAN NOTE
Patient with Permanent atrial fibrillation which is controlled currently with Beta Blocker. Patient is currently in atrial fibrillation.[id number] Score: 4.  . Anticoagulation indicated. Anticoagulation done with Eliquis .

## 2024-02-19 NOTE — HPI
Nelly Tian is a 73 year old female with past medical history of AFib/a flutter on Eliquis, CHF, COPD, diabetes mellitus, recurrent abdominal cellulitis, hypertension, iron-deficiency anemia, hyperlipidemia, MI, anxiety, and arthritis who presents with shortness on breath associated with cough and lower abdominal pain for a couple hours.  She denies chest pain, fever, chills, dizziness, lightheadedness, nausea or vomiting.  ED workup significant for CT Chest large left and small right lung concerning for pneumonia, CT abdomen/pelvis chronic large abdominal wall hernia containing numerous organs without obstruction.  ECG atrial fibrillation with controlled rate, trop 0.011.  She is febrile 100.6, WBC 12.8, lactic 2.4, procal 0.04, and .  COVID/flu negative.  While in ED she was given DuoNebs, steroids, antibiotics, and placed on continuous BiPAP, ABGs improved pH 7.42, pCO2 47.2, PO2 89, pHCO3 30.8.  Admitted to hospital medicine for further management and treatment.

## 2024-02-19 NOTE — SUBJECTIVE & OBJECTIVE
Past Medical History:   Diagnosis Date    *Atrial flutter     Angina pectoris 2017    Anxiety     Arthritis     Asthma     Atrial fibrillation     Back pain     Cataract     OD    Chest pain 2017    CHF (congestive heart failure)     Chronically on benzodiazepine therapy 2019    COPD (chronic obstructive pulmonary disease)     Depression     Diabetes mellitus     Emphysema of lung     Heart failure     Hepatomegaly 2/3/2016    Hernia     History of MI (myocardial infarction) 2016    Hypercapnic respiratory failure, chronic 2016    Hyperlipidemia     Hypertension     Iron deficiency anemia 2/3/2016    Myocardial infarction     Obesity     Peripheral vascular disease     Pneumonia     Polyneuropathy     Retinal detachment     OS    Septic shock 2017    Skin ulcer 3/18/2017    Tobacco dependence     Type II 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, not stated as uncontrolled(250.60)        Past Surgical History:   Procedure Laterality Date    BREAST BIOPSY Left 10 yrs ago    benign    CATARACT EXTRACTION Left     OS     CATARACT EXTRACTION W/  INTRAOCULAR LENS IMPLANT Right 2023    Procedure: CEIOL OD;  Surgeon: David Bautista MD;  Location: [facility];  Service: Ophthalmology;  Laterality: Right;     SECTION      x2    EYE SURGERY      HYSTERECTOMY      partial    OOPHORECTOMY      one ovary conserved    RETINAL DETACHMENT SURGERY      buckle --OS    TONSILLECTOMY         Review of patient's allergies indicates:   Allergen Reactions    Dilaudid [hydromorphone] Anaphylaxis     Other reaction(s): Anaphylaxis  Other reaction(s): Unknown    Zyvox [linezolid in dextrose 5%] Shortness Of Breath       No current facility-administered medications on file prior to encounter.     Current Outpatient Medications on File Prior to Encounter   Medication Sig    albuterol (PROVENTIL/VENTOLIN HFA) 90 mcg/actuation inhaler INHALE 2 PUFFS EVERY 6 HOURS AS NEEDED  FOR WHEEZING (RESCUE)    ascorbic acid, vitamin C, (VITAMIN C) 500 MG tablet Take 2 tablets (1,000 mg total) by mouth every evening.    atorvastatin (LIPITOR) 10 MG tablet TAKE 1 TABLET(10 MG) BY MOUTH EVERY OTHER DAY    blood sugar diagnostic Strp To check BG 2 times daily, to use with insurance preferred meter    blood-glucose meter kit To check BG 2 times daily, to use with insurance preferred meter    cefadroxil (DURICEF) 500 MG Cap Take 1 capsule (500 mg total) by mouth every 12 (twelve) hours.    ELIQUIS 5 mg Tab TAKE 1 TABLET TWICE DAILY    empagliflozin (JARDIANCE) 25 mg tablet Take 1 tablet (25 mg total) by mouth once daily.    fluconazole (DIFLUCAN) 100 MG tablet Take 2 tablets (200 mg total) by mouth once a week.    fluticasone propionate (FLONASE) 50 mcg/actuation nasal spray SHAKE LIQUID AND USE 1 SPRAY IN EACH NOSTRIL EVERY DAY    furosemide (LASIX) 40 MG tablet Take 1 tablet (40 mg total) by mouth once daily.    gabapentin (NEURONTIN) 800 MG tablet TAKE 1 TABLET(800 MG) BY MOUTH THREE TIMES DAILY    HYDROcodone-acetaminophen (NORCO) 7.5-325 mg per tablet Take 1 tablet by mouth every 12 (twelve) hours as needed for Pain. Medical necessary for greater than 7 days for chronic pain.    Lactobacillus acidophilus 1 billion cell Cap Take 1 capsule by mouth 3 (three) times daily with meals.    lancets Misc To check BG 2 times daily, to use with insurance preferred meter    LIDOcaine (LIDODERM) 5 % APPLY PATCH ONTO THE SKIN. REMOVE AND DISCARD PATCH WITHIN 12 HOURS OR AS DIRECTED BY MD    lisinopriL (PRINIVIL,ZESTRIL) 20 MG tablet     melatonin 10 mg Tab Take 10 mg by mouth nightly.    metFORMIN (GLUCOPHAGE) 500 MG tablet TAKE 1 TABLET EVERY DAY WITH BREAKFAST    multivitamin (THERAGRAN) tablet Take 1 tablet by mouth once daily.    nystatin (NYSTOP) powder APPLY TOPICALLY TO THE AFFECTED AREA TWICE DAILY    nystatin-triamcinolone (MYCOLOG II) cream Apply topically 3 (three) times daily. Apply to affected area     ondansetron (ZOFRAN-ODT) 8 MG TbDL Take 1 tablet (8 mg total) by mouth every 12 (twelve) hours as needed.    potassium chloride SA (K-DUR,KLOR-CON) 20 MEQ tablet Take 1 tablet (20 mEq total) by mouth once daily.    spironolactone (ALDACTONE) 25 MG tablet Take 1 tablet (25 mg total) by mouth once daily.    TRELEGY ELLIPTA 100-62.5-25 mcg DsDv INHALE 1 PUFF EVERY DAY. EXPIRES 42 DAYS AFTER OPENING FOIL TRAY     Family History       Problem Relation (Age of Onset)    Alcohol abuse Mother    Arrhythmia Father    Arthritis Father, Sister    Blindness Son    Cancer Brother    Cirrhosis Mother    Coronary artery disease Father, Sister    Crohn's disease Sister    Early death Sister (30)    Heart attack Father, Sister    Heart disease Father, Sister (32), Sister    Lung cancer Brother    No Known Problems Brother, Daughter          Tobacco Use    Smoking status: Former     Current packs/day: 0.00     Average packs/day: 0.3 packs/day for 54.4 years (16.3 ttl pk-yrs)     Types: Cigarettes     Start date: 1968     Quit date: 2022     Years since quittin.6    Smokeless tobacco: Never   Substance and Sexual Activity    Alcohol use: No     Alcohol/week: 0.0 standard drinks of alcohol    Drug use: No    Sexual activity: Not Currently     Review of Systems   Constitutional:  Positive for fever.   Respiratory:  Positive for cough and shortness of breath. Negative for wheezing.    Cardiovascular:  Negative for chest pain and leg swelling.   Gastrointestinal:  Positive for abdominal pain and constipation. Negative for abdominal distention, nausea and vomiting.        Large/obese abdomen     Genitourinary:  Negative for difficulty urinating.   Musculoskeletal:  Positive for arthralgias.   Neurological:  Negative for dizziness, light-headedness and headaches.     Objective:     Vital Signs (Most Recent):  Temp: 98.9 °F (37.2 °C) (24)  Pulse: 87 (24 0547)  Resp: (!) 27 (24 0558)  BP: (!) 133/59  (02/19/24 0547)  SpO2: (!) 94 % (02/19/24 0547) Vital Signs (24h Range):  Temp:  [98.9 °F (37.2 °C)-100.6 °F (38.1 °C)] 98.9 °F (37.2 °C)  Pulse:  [] 87  Resp:  [27-32] 27  SpO2:  [91 %-100 %] 94 %  BP: (113-133)/(56-66) 133/59     Weight: 113.4 kg (250 lb)  Body mass index is 38.01 kg/m².     Physical Exam  Vitals and nursing note reviewed.   Constitutional:       General: She is not in acute distress.     Appearance: Normal appearance. She is obese. She is not ill-appearing.   Cardiovascular:      Rate and Rhythm: Normal rate. Rhythm irregular.      Pulses: Normal pulses.      Heart sounds: Normal heart sounds.   Pulmonary:      Effort: Pulmonary effort is normal. No respiratory distress.      Breath sounds: Wheezing and rhonchi present.   Abdominal:      General: Bowel sounds are normal. There is no distension.      Palpations: Abdomen is soft.      Tenderness: There is no abdominal tenderness.   Musculoskeletal:      Right lower leg: No edema.      Left lower leg: No edema.   Skin:     General: Skin is warm.   Neurological:      Mental Status: She is oriented to person, place, and time. Mental status is at baseline.   Psychiatric:         Mood and Affect: Mood normal.                Significant Labs: All pertinent labs within the past 24 hours have been reviewed.    BMP:   Recent Labs   Lab 02/19/24  0145   *      K 4.7      CO2 24   BUN 15   CREATININE 0.6   CALCIUM 9.0   MG 2.0     CBC:   Recent Labs   Lab 02/19/24  0145   WBC 12.86*   HGB 12.9   HCT 41.6        CMP:   Recent Labs   Lab 02/19/24  0145      K 4.7      CO2 24   *   BUN 15   CREATININE 0.6   CALCIUM 9.0   PROT 7.1   ALBUMIN 3.3*   BILITOT 0.6   ALKPHOS 89   AST 25   ALT 21   ANIONGAP 10     Cardiac Markers:   Recent Labs   Lab 02/19/24  0145   *       Magnesium:   Recent Labs   Lab 02/19/24  0145   MG 2.0       Troponin:   Recent Labs   Lab 02/19/24  0145   TROPONINI 0.011       Urine  Studies:   Recent Labs   Lab 02/19/24  0225   COLORU Yellow   APPEARANCEUA Clear   PHUR 6.0   SPECGRAV 1.020   PROTEINUA Negative   GLUCUA 4+*   KETONESU Negative   BILIRUBINUA Negative   OCCULTUA Trace*   NITRITE Negative   UROBILINOGEN Negative   LEUKOCYTESUR Negative   RBCUA 1   WBCUA 2   BACTERIA None   SQUAMEPITHEL 7     Recent Labs     02/19/24  0347   PH 7.423   PCO2 47.2*   PO2 89   HCO3 30.8*   POCSATURATED 97   BE 6*       Significant Imaging: I have reviewed all pertinent imaging results/findings within the past 24 hours.  PROCEDURE INFORMATION:   Exam: CT Chest Without Contrast; Diagnostic   Exam date and time: 2/19/2024 3:06 AM Age: 73 years old Clinical indication: Cough and shortness of breath; Patient HX: Cough and SOB for a few days. HX of copd TECHNIQUE: Imaging protocol: Diagnostic computed tomography of the chest without contrast. 3D rendering (Not supervised by radiologist): MIP and/or 3D reconstructed images were created by the technologist. Radiation optimization: All CT scans at this facility use at least one of these dose optimization techniques: automated exposure control; mA and/or kV adjustment per patient size (includes targeted exams where dose is matched to clinical indication); or iterative reconstruction. COMPARISON: DX XR CHEST AP PORTABLE 2/19/2024 1:22 AM FINDINGS: Lungs: Large left and small right lung consolidation suspicious for pneumonia. Pleural spaces: Trace right pleural effusion. Heart: Heart is moderately enlarged. Lymph nodes: Unremarkable. No enlarged lymph nodes. Vasculature: Unremarkable. No aortic aneurysm. Bones/joints: No acute fracture. Soft tissues: Large left abdominal hernia, incompletely visualized. IMPRESSION: Large left and small right lung consolidation suspicious for pneumonia. Dictated and Authenticated by: Domenico Rodriguez MD 02/19/2024 4:22 AM Central Time ([place] & [place])    PROCEDURE INFORMATION:   Exam: CT Abdomen And Pelvis Without Contrast   Exam date  and time: 2/19/2024 3:06 AM Age: 73 years old Clinical indication: Generalized; Patient HX: Abdominal pain for several hours. TECHNIQUE: Imaging protocol: Computed tomography of the abdomen and pelvis without contrast. Radiation optimization: All CT scans at this facility use at least one of these dose optimization techniques: automated exposure control; mA and/or kV adjustment per patient size (includes targeted exams where dose is matched to clinical indication); or iterative reconstruction. COMPARISON: CT ABDOMEN PELVIS WITHOUT CONTRAST 5/4/2023 10:45 AM FINDINGS: Lungs: There is prominent consolidation at the left lung base. Pleural spaces: Trace right pleural effusion and small consolidation at the right lung base. Liver: Normal. No mass. Gallbladder and bile ducts: Stone is present within the gallbladder. Pancreas: Normal. No ductal dilation. Spleen: Normal. No splenomegaly. Adrenal glands: Normal. No mass. Kidneys and ureters: Calcification of the bilateral renal papillae may suggest chronic papillary necrosis versus stones. Stomach and bowel: Visualized bowel appears nondistended without evidence of obstruction. Majority of the bowel is herniated into the left chest/abdominal wall hernia. Appendix: No evidence of appendicitis. Intraperitoneal space: Unremarkable. No free air. No significant fluid collection. Vasculature: Unremarkable. No abdominal aortic aneurysm. Lymph nodes: Unremarkable. No enlarged lymph nodes. Urinary bladder: The bladder is normal. Reproductive: Unremarkable as visualized. Bones/joints: Unremarkable. No acute fracture. Soft tissues: There is large lateral left abdominal wall defect with herniation of the abdominal contents, partially excluded from view. Spleen is herniated into the lateral abdominal hernia. Left kidney is partially herniated into the lateral abdominal wall hernia. NYLA GIBBS Accession: [id number] MRN: [id number]  Preliminary Radiology Report  (QA)  DISCREPANCY? If there is a discrepancy between the preliminary and final interpretation, please notify [facility] via [email or web address]. If you do not have access to our QA portal, call our QA team at [phone] CONFIDENTIALITY STATEMENT This report is intended only for the use of the referring physician, and only in accordance with law, If you received this in error, call [phone] Page 2 of 2 IMPRESSION: 1. Prominent left lung base consolidation suspicious for pneumonia. 2. Large partially visualized lateral abdominal wall hernia containing numerous organs without evidence for obstruction. Appearance similar to prior. 3. Interval development of calcification of the bilateral renal papilla; correlate for papillary necrosis versus renal stones. Dictated and Authenticated by: Domenico Rodriguez MD 02/19/2024 4:20 AM Central Time ([place] & [place])

## 2024-02-19 NOTE — PLAN OF CARE
Per [facility] D[facility]- pt will need a new o2 eval and order. Will get o2 eval when pt is closer to dc       02/19/24 1315   Post-Acute Status   Post-Acute Authorization [facility]

## 2024-02-19 NOTE — NURSING
Nurses Note -- 4 Eyes      2/19/2024   9:18 AM      Skin assessed during: Admit      [] No Altered Skin Integrity Present    []Prevention Measures Documented      [x] Yes- Altered Skin Integrity Present or Discovered   [x] LDA Added if Not in Epic (Describe Wound)   [x] New Altered Skin Integrity was Present on Admit and Documented in LDA   [x] Wound Image Taken    Wound Care Consulted? Yes    Attending Nurse:  Micki Wise RN/Staff Member:  Katherine

## 2024-02-19 NOTE — PLAN OF CARE
Notified [facility] dme that pt reported her home oxygen is not working. Pt also said she needs a new RW       02/19/24 1303   Post-Acute Status   Post-Acute Authorization [facility]

## 2024-02-19 NOTE — NURSING
Awake alert and oriented x4. Currently on bipap, 93-95%. No distress noted. 20g to lt wrist. Reviewed room/unit assigned. Questions answered and encouraged. Denies any complaints at this time.

## 2024-02-19 NOTE — ASSESSMENT & PLAN NOTE
Patient with current diagnosis of pneumonia I have reviewed the pertinent imaging. Current antimicrobial regimen consists of   Antibiotics (From admission, onward)      Start     Stop Route Frequency Ordered    02/19/24 1000  piperacillin-tazobactam (ZOSYN) 4.5 g in dextrose 5 % in water (D5W) 100 mL IVPB (MB+)         -- IV Every 8 hours (non-standard times) 02/19/24 0452    02/19/24 0552  vancomycin - pharmacy to dose  (vancomycin IVPB (PEDS and ADULTS))        See Hyperspace for full Linked Orders Report.    -- IV pharmacy to manage frequency 02/19/24 0453    02/19/24 0400  vancomycin (VANCOCIN) 2,000 mg in dextrose 5 % (D5W) 500 mL IVPB         02/19/24 1559 IV ED 1 Time 02/19/24 0349        . Cultures drawn and noted-   Microbiology Results (last 7 days)       Procedure Component Value Units Date/Time    Blood culture x two cultures. Draw prior to antibiotics. [[id number]] Collected: 02/19/24 0145    Order Status: Sent Specimen: Blood from Peripheral, Forearm, Right Updated: 02/19/24 0146    Blood culture x two cultures. Draw prior to antibiotics. [[id number]] Collected: 02/19/24 0146    Order Status: Sent Specimen: Blood from Peripheral, Forearm, Left Updated: 02/19/24 0146    Influenza A & B by Molecular [[id number]] Collected: 02/19/24 0120    Order Status: Completed Specimen: Nasopharyngeal Swab Updated: 02/19/24 0142     Influenza A, Molecular Negative     Influenza B, Molecular Negative     Flu A & B Source NP         Will monitor patient closely and continue current treatment plan unchanged.

## 2024-02-19 NOTE — TELEPHONE ENCOUNTER
he pt was just admitted to the hospital. Over the weekend, oxygen machine stopped working.  They are needing orders for a new one

## 2024-02-19 NOTE — ASSESSMENT & PLAN NOTE
Patient's COPD is with exacerbation noted by worsening of baseline hypoxia currently.  Patient is currently off COPD Pathway. Continue scheduled inhalers Steroids, Antibiotics, and Supplemental oxygen and monitor respiratory status closely.

## 2024-02-19 NOTE — Clinical Note
Diagnosis: Pneumonia [643422]   Future Attending Provider: JOVANNY SMITH [[id number]]   Admit to which facility:: [facility] [[id number]]   Reason for IP Medical Treatment  (Clinical interventions that can only be accomplished in the IP setting? ) :: Continuous BiPAP   I certify that Inpatient services for greater than or equal to 2 midnights are medically necessary:: Yes   Plans for Post-Acute care--if anticipated (pick the single best option):: A. No post acute care anticipated at this time   Special Needs:: No Special Needs [1]

## 2024-02-19 NOTE — PROGRESS NOTES
[facility]  Progress Note    Patient Name: Nelly Tian  MRN: [id number]  Patient Class: IP- Inpatient   Admission Date: 2/19/2024  Length of Stay: 0 days  Attending Physician: Kvng Maloney MD  Primary Care Provider: Constantine Bird MD        Subjective:     Principal Problem:Acute on chronic respiratory failure with hypoxia and hypercapnia        HPI:  Nelly Tian is a 73 year old female with past medical history of AFib/a flutter on Eliquis, CHF, COPD, diabetes mellitus, recurrent abdominal cellulitis, hypertension, iron-deficiency anemia, hyperlipidemia, MI, anxiety, and arthritis who presents with shortness on breath associated with cough and lower abdominal pain for a couple hours.  She denies chest pain, fever, chills, dizziness, lightheadedness, nausea or vomiting.  ED workup significant for CT Chest large left and small right lung concerning for pneumonia, CT abdomen/pelvis chronic large abdominal wall hernia containing numerous organs without obstruction.  ECG atrial fibrillation with controlled rate, trop 0.011.  She is febrile 100.6, WBC 12.8, lactic 2.4, procal 0.04, and .  COVID/flu negative.  While in ED she was given DuoNebs, steroids, antibiotics, and placed on continuous BiPAP, ABGs improved pH 7.42, pCO2 47.2, PO2 89, pHCO3 30.8.  Admitted to hospital medicine for further management and treatment.          Overview/Hospital Course:  No notes on file    Interval History:  Productive cough, fever chills for around a week associated with shortness of breath, oxygen machine and nebulizer machine at home is not functioning.  Multiple family members at home also sick.  COVID-19 flu and RSV negative/     Review of Systems   Constitutional:  Negative for activity change and fever.   HENT:  Negative for ear discharge, facial swelling and sore throat.    Eyes:  Negative for photophobia, pain and visual disturbance.   Respiratory:  Positive for cough  and shortness of breath. Negative for apnea.    Cardiovascular:  Negative for chest pain and leg swelling.   Gastrointestinal:  Negative for abdominal pain and blood in stool.   Endocrine: Negative for cold intolerance and heat intolerance.   Musculoskeletal:  Negative for back pain and gait problem.   Skin:  Negative for pallor and rash.   Neurological:  Negative for speech difficulty and headaches.   Psychiatric/Behavioral:  Negative for confusion, hallucinations and suicidal ideas.    All other systems reviewed and are negative.    Objective:     Vital Signs (Most Recent):  Temp: 98.7 °F (37.1 °C) (02/19/24 0823)  Pulse: 75 (02/19/24 0823)  Resp: 19 (02/19/24 0823)  BP: (!) 126/55 (02/19/24 0823)  SpO2: 97 % (02/19/24 0823) Vital Signs (24h Range):  Temp:  [98.7 °F (37.1 °C)-100.6 °F (38.1 °C)] 98.7 °F (37.1 °C)  Pulse:  [] 75  Resp:  [19-32] 19  SpO2:  [91 %-100 %] 97 %  BP: (113-133)/(55-66) 126/55     Weight: 132.6 kg (292 lb 5.3 oz)  Body mass index is 44.45 kg/m².  No intake or output data in the 24 hours ending 02/19/24 1234      Physical Exam  Constitutional:       Appearance: She is obese. She is ill-appearing.   HENT:      Head: Normocephalic and atraumatic.      Nose: Nose normal.   Eyes:      Extraocular Movements: Extraocular movements intact.      Pupils: Pupils are equal, round, and reactive to light.   Cardiovascular:      Rate and Rhythm: Normal rate and regular rhythm.      Heart sounds: No murmur heard.  Pulmonary:      Effort: Pulmonary effort is normal.      Breath sounds: Normal breath sounds.   Abdominal:      General: Abdomen is flat.      Palpations: Abdomen is soft.      Hernia: A hernia is present.   Musculoskeletal:         General: Normal range of motion.      Cervical back: Normal range of motion and neck supple.   Skin:     General: Skin is warm and dry.   Neurological:      General: No focal deficit present.      Mental Status: She is alert and oriented to person, place, and  time.   Psychiatric:         Mood and Affect: Mood normal.             Significant Labs: All pertinent labs within the past 24 hours have been reviewed.  CBC:   Recent Labs   Lab 02/19/24  0145   WBC 12.86*   HGB 12.9   HCT 41.6        CMP:   Recent Labs   Lab 02/19/24  0145      K 4.7      CO2 24   *   BUN 15   CREATININE 0.6   CALCIUM 9.0   PROT 7.1   ALBUMIN 3.3*   BILITOT 0.6   ALKPHOS 89   AST 25   ALT 21   ANIONGAP 10       Significant Imaging: X-Ray Chest AP Portable    Result Date: 2/19/2024  EXAMINATION: XR CHEST AP PORTABLE CLINICAL HISTORY: Sepsis; TECHNIQUE: Single frontal view of the chest was performed. COMPARISON: 06/22/2023 FINDINGS: There is airspace disease in both lower lung zones.  Enlarged cardiac silhouette.  Atherosclerosis.  Obscured left hemidiaphragm.  No significant pleural fluid.  No pneumothorax.     Bibasilar airspace disease worse on the left.  This could reflect atelectasis pneumonia aspiration or a combination there of. Electronically signed by: Greg Samuels Date:    02/19/2024 Time:    08:53    CT Chest Without Contrast    Result Date: 2/19/2024  EXAMINATION: CT CHEST WITHOUT CONTRAST CLINICAL HISTORY: Pulmonary embolism (PE) suspected, high prob; TECHNIQUE: Low dose axial images, sagittal and coronal reformations were obtained from the thoracic inlet to the lung bases. Contrast was not administered. COMPARISON: None. FINDINGS: Respiratory motion artifact in the lungs limits fine detail. There is a 2.2 cm nodular focal airspace opacity in the inferior right upper lobe series 6, image 196.  There is dependent atelectasis right middle and lower lobes.  There is diffuse airspace disease through the left lower lobe.  Right pleural fluid.  Enlarged heart with calcification of the aortic valve and coronary arteries.  9 mm hypodense lesion in the right thyroid gland.  Multiple enlarged lymph nodes in the mediastinum with a reference measuring 1.5 cm in the  precarinal space series 2 image 44.  Hyperdensity along the left renal collecting system is presumably early excretion of contrast.  Partially imaged large left ventral abdominal hernia.  Multilevel spinal degenerative changes.     1. Patchy airspace disease left lower lobe suspicious for aspiration or pneumonia. 2. Nodular opacity in the right upper lobe could reflect additional sequela of the pneumonia/aspiration, although additional follow-up is recommended with CT in 3 months to exclude underlying neoplasm. 3. Mediastinal adenopathy is presumably reactive.  Recommend attention on follow-up imaging. 4. Enlarged heart with coronary artery disease. 5. Right thyroid nodule or cyst could be further characterized with elective ultrasound as clinically warranted. Electronically signed by: Greg Samuels Date:    02/19/2024 Time:    08:23    CT Abdomen Pelvis  Without Contrast    Result Date: 2/19/2024  EXAMINATION: CT ABDOMEN PELVIS WITHOUT CONTRAST CLINICAL HISTORY: LLQ abdominal pain; TECHNIQUE: Low dose axial images, sagittal and coronal reformations were obtained from the lung bases to the pubic symphysis.  Oral contrast was not administered. COMPARISON: 05/04/2023 FINDINGS: There is atelectasis and small volume pleural fluid at the right lung base.  There is diffuse patchy airspace disease at the left lung base.  Heart size is enlarged with calcified coronary artery plaque.  The gallbladder is minimally distended and there is a stone in the lumen which measures 1.5 cm. There is hyperdensity along the renal papilla at multiple sites in each kidney.  Pancreas atrophy.  Evidence of remote infarct or trauma along the spleen.  Remaining solid abdominal organs are unremarkable. There is no enteric contrast which limits bowel assessment.  No dilated bowel loops.  Scattered colonic diverticula. There is a large left lateral abdominal wall hernia containing spleen pancreas tail part of the stomach and multiple nondilated  loops of colon and small bowel.  The left kidney and tip of the left hepatic lobe extend to the neck of the defect.  Craniocaudad length of the hernia is 23 cm.  There is excretion of contrast in the urinary bladder and unremarkable uterus. Bilateral L5 pars defects.  There is 2 mm retrolisthesis L4 on L5 and 10 mm anterolisthesis L5 on S1.  Advanced multilevel degenerative changes in the lumbar spine worst at L5-S1.  At least moderate degenerative change in the right hip joint.     1. Left lower lobe airspace disease suspicious for pneumonia or aspiration. 2. Cardiomegaly with coronary artery disease. 3. Cholelithiasis. 4. Hyperdensity along the renal papillae is likely from early excretion of contrast. 5. Large left ventral hernia containing multiple abdominal organs.  No evidence for bowel obstruction.  This is a chronic finding. Electronically signed by: Greg Samuels Date:    02/19/2024 Time:    08:16  - pulls last radiology orders      Assessment/Plan:      * Acute on chronic respiratory failure with hypoxia and hypercapnia  Patient with Hypercapnic and Hypoxic Respiratory failure which is Acute on chronic.  she is not on home oxygen. Supplemental oxygen was provided and noted- Oxygen Concentration (%):  [24-40] 40    .   Signs/symptoms of respiratory failure include- tachypnea, increased work of breathing, and respiratory distress. Contributing diagnoses includes - CHF, COPD, and Pneumonia Labs and images were reviewed. Patient Has recent ABG, which has been reviewed. Will treat underlying causes and adjust management of respiratory failure as follows-     Continuous BiPAP  DuoNebs, steroids, and antibiotics     Patient oxygen machine and nebulizer machine was not functioning at home, discussed with case management we will request this upon discharge.       Flank hernia  Chronic condition note  Analgesic and antiemetics ordered   CT abdomen/pelvis chronic large abdominal wall hernia containing numerous  organs without obstruction      COPD (chronic obstructive pulmonary disease)  Patient's COPD is with exacerbation noted by worsening of baseline hypoxia currently.  Patient is currently off COPD Pathway. Continue scheduled inhalers Steroids, Antibiotics, and Supplemental oxygen and monitor respiratory status closely.     Type 2 diabetes mellitus with diabetic neuropathy, without long-term current use of insulin  Patient's FSGs are controlled on current medication regimen.  Last A1c reviewed-   Lab Results   Component Value Date    HGBA1C 5.4 04/27/2023     Most recent fingerstick glucose reviewed-   Recent Labs   Lab 02/19/24  0813   POCTGLUCOSE 158*     Current correctional scale  Medium  Maintain anti-hyperglycemic dose as follows-   Antihyperglycemics (From admission, onward)      Start     Stop Route Frequency Ordered    02/19/24 1036  insulin aspart U-100 pen 0-10 Units         -- SubQ Before meals & nightly PRN 02/19/24 0937    02/19/24 0538  insulin aspart U-100 pen 0-10 Units         -- SubQ Before meals & nightly PRN 02/19/24 0455          Hold Oral hypoglycemics while patient is in the hospital.        Chronic atrial fibrillation  Patient with Permanent atrial fibrillation which is controlled currently with Beta Blocker. Patient is currently in atrial fibrillation.[id number] Score: 4.  . Anticoagulation indicated. Anticoagulation done with Eliquis .    Pneumonia  Patient with current diagnosis of pneumonia I have reviewed the pertinent imaging. Current antimicrobial regimen consists of   Antibiotics (From admission, onward)      Start     Stop Route Frequency Ordered    02/19/24 0845  azithromycin (ZITHROMAX) 500 mg in dextrose 5 % (D5W) 250 mL IVPB (Vial-Mate)         -- IV Every 24 hours (non-standard times) 02/19/24 0738    02/19/24 0745  cefTRIAXone (ROCEPHIN) 2 g in dextrose 5 % in water (D5W) 100 mL IVPB (MB+)         -- IV Every 24 hours (non-standard times) 02/19/24 0738        . Cultures drawn and  noted-   Microbiology Results (last 7 days)       Procedure Component Value Units Date/Time    Blood culture x two cultures. Draw prior to antibiotics. [[id number]] Collected: 02/19/24 0145    Order Status: Sent Specimen: Blood from Peripheral, Forearm, Right Updated: 02/19/24 0912    Blood culture x two cultures. Draw prior to antibiotics. [[id number]] Collected: 02/19/24 0146    Order Status: Sent Specimen: Blood from Peripheral, Forearm, Left Updated: 02/19/24 0912    Influenza A & B by Molecular [[id number]] Collected: 02/19/24 0120    Order Status: Completed Specimen: Nasopharyngeal Swab Updated: 02/19/24 0142     Influenza A, Molecular Negative     Influenza B, Molecular Negative     Flu A & B Source NP         Will monitor patient closely and continue current treatment plan unchanged.    Check a urine Legionella, urine strep as well      VTE Risk Mitigation (From admission, onward)           Ordered     apixaban tablet 5 mg  2 times daily         02/19/24 0455     IP VTE HIGH RISK PATIENT  Once         02/19/24 0455     Place sequential compression device  Until discontinued         02/19/24 0455                    Discharge Planning   KRISTOPHER: 2/22/2024     Code Status: Full Code   Is the patient medically ready for discharge?:     Reason for patient still in hospital (select all that apply): Patient trending condition and Treatment  Discharge Plan A: Home Health                  Kvng Maloney MD  Department of Hospital Medicine   [facility]/Surg

## 2024-02-19 NOTE — ASSESSMENT & PLAN NOTE
Patient with Hypercapnic and Hypoxic Respiratory failure which is Acute on chronic.  she is not on home oxygen. Supplemental oxygen was provided and noted- Oxygen Concentration (%):  [40] 40    .   Signs/symptoms of respiratory failure include- tachypnea, increased work of breathing, and respiratory distress. Contributing diagnoses includes - CHF, COPD, and Pneumonia Labs and images were reviewed. Patient Has recent ABG, which has been reviewed. Will treat underlying causes and adjust management of respiratory failure as follows-     Continuous BiPAP  DuoNebs, steroids, and antibiotics

## 2024-02-19 NOTE — CONSULTS
"Pharmacokinetic Initial Assessment: IV Vancomycin    Assessment/Plan:    Initiate intravenous vancomycin with dose of 2000 mg once followed by a maintenance dose of vancomycin 1250mg IV every 12 hours  Desired empiric serum trough concentration is 15 to 20 mcg/mL  Draw vancomycin trough level 60 min prior to fourth dose on 02/20/24 at approximately 1530  Pharmacy will continue to follow and monitor vancomycin.      Please contact pharmacy at extension [phone] with any questions regarding this assessment.     Thank you for the consult,   Luciano Jazzy       Patient brief summary:  Nelly Tian is a 73 y.o. female initiated on antimicrobial therapy with IV Vancomycin for treatment of suspected lower respiratory infection    Drug Allergies:   Review of patient's allergies indicates:   Allergen Reactions    Dilaudid [hydromorphone] Anaphylaxis     Other reaction(s): Anaphylaxis  Other reaction(s): Unknown    Zyvox [linezolid in dextrose 5%] Shortness Of Breath       Actual Body Weight:   113.4kg    Renal Function:   Estimated Creatinine Clearance: 112 mL/min (based on SCr of 0.6 mg/dL).,     CBC (last 72 hours):  Recent Labs   Lab Result Units 02/19/24  0145   WBC K/uL 12.86*   Hemoglobin g/dL 12.9   Hematocrit % 41.6   Platelets K/uL 200   Gran % % 74.9*   Lymph % % 9.9*   Mono % % 11.7   Eosinophil % % 2.6   Basophil % % 0.4   Differential Method  Automated       Metabolic Panel (last 72 hours):  Recent Labs   Lab Result Units 02/19/24  0145 02/19/24  0225   Sodium mmol/L 136  --    Potassium mmol/L 4.7  --    Chloride mmol/L 102  --    CO2 mmol/L 24  --    Glucose mg/dL 115*  --    Glucose, UA   --  4+*   BUN mg/dL 15  --    Creatinine mg/dL 0.6  --    Albumin g/dL 3.3*  --    Total Bilirubin mg/dL 0.6  --    Alkaline Phosphatase U/L 89  --    AST U/L 25  --    ALT U/L 21  --    Magnesium mg/dL 2.0  --        Drug levels (last 3 results):  No results for input(s): "VANCOMYCINRA", "VANCORANDOM", "VANCOMYCINPE", " ""VANCOPEAK", "VANCOMYCINTR", "VANCOTROUGH" in the last 72 hours.    Microbiologic Results:  Microbiology Results (last 7 days)       Procedure Component Value Units Date/Time    Blood culture x two cultures. Draw prior to antibiotics. [[id number]] Collected: 02/19/24 0145    Order Status: Sent Specimen: Blood from Peripheral, Forearm, Right Updated: 02/19/24 0146    Blood culture x two cultures. Draw prior to antibiotics. [[id number]] Collected: 02/19/24 0146    Order Status: Sent Specimen: Blood from Peripheral, Forearm, Left Updated: 02/19/24 0146    Influenza A & B by Molecular [[id number]] Collected: 02/19/24 0120    Order Status: Completed Specimen: Nasopharyngeal Swab Updated: 02/19/24 0142     Influenza A, Molecular Negative     Influenza B, Molecular Negative     Flu A & B Source NP            "

## 2024-02-19 NOTE — PLAN OF CARE
[facility] - Med/Surg  Initial Discharge Assessment       Primary Care Provider: Constantine Bird MD    Admission Diagnosis: Pneumonia [J18.9]    Admission Date: 2/19/2024  Expected Discharge Date: 2/22/2024    Transition of Care Barriers: Mobility    Spoke to pt at bedside to complete dc assessment. Verified facesheet. Lives with daughter Maye. PCP [facility]on. Pharmacy [facility]. DME pt reports her walker is old and broken. Her oxygen is also broken. Both from [facility]. Denies hd/hh/coumadin. Pt takes eliquis. Pt without recent admit. Family to provide transport home. CM to follow for dc needs      Payor: [employer] MANAGED MEDICARE / Plan: [employer] MEDICARE PPO / Product Type: Medicare Advantage /     Extended Emergency Contact Information  Primary Emergency Contact: Maye Tirado  Address: [facility] [place] [address]           [place] [address] [address]  Home Phone: [phone]  Work Phone: [phone]  Mobile Phone: [phone]  Relation: Daughter  Preferred language: English   needed? No  Secondary Emergency Contact: Sharda Finn  Mobile Phone: [phone]  Relation: Friend  Preferred language: English   needed? No    Discharge Plan A: Home Health  Discharge Plan B: Home with family      [id number] - [place]  [address] AT [facility] & [address] [facility] [address]  [address]  [place] [address]  Phone: [phone] Fax: [phone]    [facility] - [facility] [address]  [address] [facility] [address]  Phone: [phone] Fax: [phone]    [id number] - [place] - [address] AT [address] & [facility]  [address] [place] [address]  Phone: [phone] Fax: [phone]      Initial Assessment (most recent)       Adult Discharge Assessment - 02/19/24 1124          Discharge Assessment    Assessment Type Discharge Planning Assessment     Confirmed/corrected address,  phone number and insurance Yes     Confirmed Demographics Correct on Facesheet     Source of Information patient     People in Home child(bonny), adult     Do you expect to return to your current living situation? Yes     Prior to hospitilization cognitive status: Unable to Assess     Current cognitive status: Alert/Oriented     Walking or Climbing Stairs Difficulty yes     Walking or Climbing Stairs ambulation difficulty, requires equipment     Dressing/Bathing Difficulty yes     Dressing/Bathing bathing difficulty, requires equipment     Equipment Currently Used at Home nebulizer;oxygen;glucometer     Readmission within 30 days? No     Patient currently being followed by outpatient case management? No     Do you currently have service(s) that help you manage your care at home? No     Do you take prescription medications? Yes     Do you have prescription coverage? Yes     Coverage [employer]     Do you have any problems affording any of your prescribed medications? No     Is the patient taking medications as prescribed? yes     How do you get to doctors appointments? family or friend will provide     Are you on dialysis? No     Do you take coumadin? No     Discharge Plan A Home Health     Discharge Plan B Home with family     DME Needed Upon Discharge  oxygen;walker, rolling     Discharge Plan discussed with: Patient     Transition of Care Barriers Mobility

## 2024-02-19 NOTE — TELEPHONE ENCOUNTER
What's are her pulse oxymetry reading at ? Also after walking? Are we talking about her portable concentrator or her CPAP?

## 2024-02-19 NOTE — ASSESSMENT & PLAN NOTE
Patient with Permanent atrial fibrillation which is controlled currently with Beta Blocker. Patient is currently in atrial fibrillation.[id number] Score: 4.  . Anticoagulation indicated. Anticoagulation done with Eliquis .

## 2024-02-19 NOTE — ASSESSMENT & PLAN NOTE
Patient with current diagnosis of pneumonia I have reviewed the pertinent imaging. Current antimicrobial regimen consists of   Antibiotics (From admission, onward)      Start     Stop Route Frequency Ordered    02/19/24 0845  azithromycin (ZITHROMAX) 500 mg in dextrose 5 % (D5W) 250 mL IVPB (Vial-Mate)         -- IV Every 24 hours (non-standard times) 02/19/24 0738    02/19/24 0745  cefTRIAXone (ROCEPHIN) 2 g in dextrose 5 % in water (D5W) 100 mL IVPB (MB+)         -- IV Every 24 hours (non-standard times) 02/19/24 0738        . Cultures drawn and noted-   Microbiology Results (last 7 days)       Procedure Component Value Units Date/Time    Blood culture x two cultures. Draw prior to antibiotics. [[id number]] Collected: 02/19/24 0145    Order Status: Sent Specimen: Blood from Peripheral, Forearm, Right Updated: 02/19/24 0912    Blood culture x two cultures. Draw prior to antibiotics. [[id number]] Collected: 02/19/24 0146    Order Status: Sent Specimen: Blood from Peripheral, Forearm, Left Updated: 02/19/24 0912    Influenza A & B by Molecular [[id number]] Collected: 02/19/24 0120    Order Status: Completed Specimen: Nasopharyngeal Swab Updated: 02/19/24 0142     Influenza A, Molecular Negative     Influenza B, Molecular Negative     Flu A & B Source NP         Will monitor patient closely and continue current treatment plan unchanged.    Check a urine Legionella, urine strep as well

## 2024-02-19 NOTE — PROGRESS NOTES
"Patient is on Zosyn 4.5 g IV Q6h over 30 minutes (intermittent infusion). Due to the patient's current diagnosis, zosyn via extended infusion provides better clinical outcomes regarding decreased mortality and decreased length of stay in comparison to intermittent infusion. Zosyn dose will be adjusted to Zosyn 4.5 g IV Q8h over 4 hours. Per pharmacy protocol, Zosyn can be adjusted automatically. Providers can override adjustment by re-ordering intermittent infusion with "dispense as written" in the administration instructions.     Luciano Purcell  [phone]    "

## 2024-02-20 LAB
ANION GAP SERPL CALC-SCNC: 10 MMOL/L (ref 8–16)
BASOPHILS # BLD AUTO: 0.02 K/UL (ref 0–0.2)
BASOPHILS NFR BLD: 0.1 % (ref 0–1.9)
BUN SERPL-MCNC: 23 MG/DL (ref 8–23)
CALCIUM SERPL-MCNC: 8.9 MG/DL (ref 8.7–10.5)
CHLORIDE SERPL-SCNC: 100 MMOL/L (ref 95–110)
CO2 SERPL-SCNC: 27 MMOL/L (ref 23–29)
CREAT SERPL-MCNC: 0.7 MG/DL (ref 0.5–1.4)
DIFFERENTIAL METHOD BLD: ABNORMAL
EOSINOPHIL # BLD AUTO: 0 K/UL (ref 0–0.5)
EOSINOPHIL NFR BLD: 0 % (ref 0–8)
ERYTHROCYTE [DISTWIDTH] IN BLOOD BY AUTOMATED COUNT: 14.5 % (ref 11.5–14.5)
EST. GFR  (NO RACE VARIABLE): >60 ML/MIN/1.73 M^2
GLUCOSE SERPL-MCNC: 191 MG/DL (ref 70–110)
HCT VFR BLD AUTO: 37.7 % (ref 37–48.5)
HGB BLD-MCNC: 11.7 G/DL (ref 12–16)
IMM GRANULOCYTES # BLD AUTO: 0.08 K/UL (ref 0–0.04)
IMM GRANULOCYTES NFR BLD AUTO: 0.5 % (ref 0–0.5)
LYMPHOCYTES # BLD AUTO: 0.9 K/UL (ref 1–4.8)
LYMPHOCYTES NFR BLD: 5.6 % (ref 18–48)
MAGNESIUM SERPL-MCNC: 2.8 MG/DL (ref 1.6–2.6)
MCH RBC QN AUTO: 30.8 PG (ref 27–31)
MCHC RBC AUTO-ENTMCNC: 31 G/DL (ref 32–36)
MCV RBC AUTO: 99 FL (ref 82–98)
MONOCYTES # BLD AUTO: 0.9 K/UL (ref 0.3–1)
MONOCYTES NFR BLD: 5.6 % (ref 4–15)
NEUTROPHILS # BLD AUTO: 14 K/UL (ref 1.8–7.7)
NEUTROPHILS NFR BLD: 88.2 % (ref 38–73)
NRBC BLD-RTO: 0 /100 WBC
PLATELET # BLD AUTO: 205 K/UL (ref 150–450)
PMV BLD AUTO: 10.7 FL (ref 9.2–12.9)
POCT GLUCOSE: 154 MG/DL (ref 70–110)
POCT GLUCOSE: 179 MG/DL (ref 70–110)
POCT GLUCOSE: 187 MG/DL (ref 70–110)
POCT GLUCOSE: 202 MG/DL (ref 70–110)
POCT GLUCOSE: 221 MG/DL (ref 70–110)
POTASSIUM SERPL-SCNC: 4.4 MMOL/L (ref 3.5–5.1)
RBC # BLD AUTO: 3.8 M/UL (ref 4–5.4)
SODIUM SERPL-SCNC: 137 MMOL/L (ref 136–145)
WBC # BLD AUTO: 15.83 K/UL (ref 3.9–12.7)

## 2024-02-20 PROCEDURE — 99900035 HC TECH TIME PER 15 MIN (STAT)

## 2024-02-20 PROCEDURE — 87801 DETECT AGNT MULT DNA AMPLI: CPT | Performed by: HOSPITALIST

## 2024-02-20 PROCEDURE — 27100171 HC OXYGEN HIGH FLOW UP TO 24 HOURS

## 2024-02-20 PROCEDURE — 83735 ASSAY OF MAGNESIUM: CPT | Performed by: HOSPITALIST

## 2024-02-20 PROCEDURE — 25000242 PHARM REV CODE 250 ALT 637 W/ HCPCS: Performed by: NURSE PRACTITIONER

## 2024-02-20 PROCEDURE — 94660 CPAP INITIATION&MGMT: CPT

## 2024-02-20 PROCEDURE — 87899 AGENT NOS ASSAY W/OPTIC: CPT | Performed by: HOSPITALIST

## 2024-02-20 PROCEDURE — 94640 AIRWAY INHALATION TREATMENT: CPT

## 2024-02-20 PROCEDURE — 25000003 PHARM REV CODE 250: Performed by: HOSPITALIST

## 2024-02-20 PROCEDURE — 85025 COMPLETE CBC W/AUTO DIFF WBC: CPT | Performed by: HOSPITALIST

## 2024-02-20 PROCEDURE — 87449 NOS EACH ORGANISM AG IA: CPT | Performed by: HOSPITALIST

## 2024-02-20 PROCEDURE — 63600175 PHARM REV CODE 636 W HCPCS

## 2024-02-20 PROCEDURE — 97161 PT EVAL LOW COMPLEX 20 MIN: CPT

## 2024-02-20 PROCEDURE — 25000242 PHARM REV CODE 250 ALT 637 W/ HCPCS

## 2024-02-20 PROCEDURE — 25000003 PHARM REV CODE 250

## 2024-02-20 PROCEDURE — 80048 BASIC METABOLIC PNL TOTAL CA: CPT | Performed by: HOSPITALIST

## 2024-02-20 PROCEDURE — 94761 N-INVAS EAR/PLS OXIMETRY MLT: CPT

## 2024-02-20 PROCEDURE — 27000221 HC OXYGEN, UP TO 24 HOURS

## 2024-02-20 PROCEDURE — 63600175 PHARM REV CODE 636 W HCPCS: Performed by: HOSPITALIST

## 2024-02-20 PROCEDURE — 11000001 HC ACUTE MED/SURG PRIVATE ROOM

## 2024-02-20 RX ADMIN — ARFORMOTEROL TARTRATE 15 MCG: 15 SOLUTION RESPIRATORY (INHALATION) at 07:02

## 2024-02-20 RX ADMIN — INSULIN ASPART 4 UNITS: 100 INJECTION, SOLUTION INTRAVENOUS; SUBCUTANEOUS at 04:02

## 2024-02-20 RX ADMIN — APIXABAN 5 MG: 2.5 TABLET, FILM COATED ORAL at 08:02

## 2024-02-20 RX ADMIN — BUDESONIDE 0.5 MG: 0.5 INHALANT RESPIRATORY (INHALATION) at 07:02

## 2024-02-20 RX ADMIN — CEFTRIAXONE SODIUM 2 G: 2 INJECTION, POWDER, FOR SOLUTION INTRAMUSCULAR; INTRAVENOUS at 09:02

## 2024-02-20 RX ADMIN — IPRATROPIUM BROMIDE AND ALBUTEROL SULFATE 3 ML: .5; 2.5 SOLUTION RESPIRATORY (INHALATION) at 12:02

## 2024-02-20 RX ADMIN — ACETAMINOPHEN 650 MG: 325 TABLET ORAL at 01:02

## 2024-02-20 RX ADMIN — GABAPENTIN 800 MG: 400 CAPSULE ORAL at 08:02

## 2024-02-20 RX ADMIN — METHYLPREDNISOLONE SODIUM SUCCINATE 40 MG: 40 INJECTION, POWDER, FOR SOLUTION INTRAMUSCULAR; INTRAVENOUS at 06:02

## 2024-02-20 RX ADMIN — INSULIN ASPART 2 UNITS: 100 INJECTION, SOLUTION INTRAVENOUS; SUBCUTANEOUS at 08:02

## 2024-02-20 RX ADMIN — METHYLPREDNISOLONE SODIUM SUCCINATE 40 MG: 40 INJECTION, POWDER, FOR SOLUTION INTRAMUSCULAR; INTRAVENOUS at 01:02

## 2024-02-20 RX ADMIN — BUDESONIDE 0.5 MG: 0.5 INHALANT RESPIRATORY (INHALATION) at 06:02

## 2024-02-20 RX ADMIN — ATORVASTATIN CALCIUM 10 MG: 10 TABLET, FILM COATED ORAL at 09:02

## 2024-02-20 RX ADMIN — IPRATROPIUM BROMIDE AND ALBUTEROL SULFATE 3 ML: .5; 2.5 SOLUTION RESPIRATORY (INHALATION) at 07:02

## 2024-02-20 RX ADMIN — METHYLPREDNISOLONE SODIUM SUCCINATE 40 MG: 40 INJECTION, POWDER, FOR SOLUTION INTRAMUSCULAR; INTRAVENOUS at 11:02

## 2024-02-20 RX ADMIN — ACETAMINOPHEN 650 MG: 325 TABLET ORAL at 08:02

## 2024-02-20 RX ADMIN — SPIRONOLACTONE 25 MG: 25 TABLET ORAL at 09:02

## 2024-02-20 RX ADMIN — IPRATROPIUM BROMIDE AND ALBUTEROL SULFATE 3 ML: .5; 2.5 SOLUTION RESPIRATORY (INHALATION) at 06:02

## 2024-02-20 RX ADMIN — GABAPENTIN 800 MG: 400 CAPSULE ORAL at 09:02

## 2024-02-20 RX ADMIN — AZITHROMYCIN MONOHYDRATE 500 MG: 500 INJECTION, POWDER, LYOPHILIZED, FOR SOLUTION INTRAVENOUS at 10:02

## 2024-02-20 RX ADMIN — FUROSEMIDE 40 MG: 10 INJECTION, SOLUTION INTRAMUSCULAR; INTRAVENOUS at 09:02

## 2024-02-20 RX ADMIN — APIXABAN 5 MG: 2.5 TABLET, FILM COATED ORAL at 09:02

## 2024-02-20 RX ADMIN — IPRATROPIUM BROMIDE AND ALBUTEROL SULFATE 3 ML: .5; 2.5 SOLUTION RESPIRATORY (INHALATION) at 01:02

## 2024-02-20 RX ADMIN — LISINOPRIL 20 MG: 10 TABLET ORAL at 09:02

## 2024-02-20 RX ADMIN — INSULIN ASPART 2 UNITS: 100 INJECTION, SOLUTION INTRAVENOUS; SUBCUTANEOUS at 11:02

## 2024-02-20 RX ADMIN — THERA TABS 1 TABLET: TAB at 09:02

## 2024-02-20 RX ADMIN — GABAPENTIN 800 MG: 400 CAPSULE ORAL at 04:02

## 2024-02-20 RX ADMIN — INSULIN ASPART 2 UNITS: 100 INJECTION, SOLUTION INTRAVENOUS; SUBCUTANEOUS at 07:02

## 2024-02-20 NOTE — CARE UPDATE
02/19/24 1942   Patient Assessment/Suction   Level of Consciousness (AVPU) alert   PRE-TX-O2   Device (Oxygen Therapy) BIPAP   $ Is the patient on High Flow Oxygen? Yes   Oxygen Concentration (%) 40   Equipment Change   $ RT Equipment medium nasal mask   Ready to Wean/Extubation Screen   FIO2<=50 (chart decimal) 0.4   Preset CPAP/BiPAP Settings   Mode Of Delivery BiPAP   $ Initial CPAP/BiPAP Setup? No   $ Is patient using? Yes   Size of Mask Small/Medium   Sized Appropriately? Yes   Equipment Type V60   Airway Device Type medium nasal mask  (new mask due to nose redness)   Ipap 12   EPAP (cm H2O) 6   Pressure Support (cm H2O) 6   Set Rate (Breaths/Min) 10   ITime (sec) 0.8   Rise Time (sec) 0.3   Patient CPAP/BiPAP Settings   RR Total (Breaths/Min) 32   Tidal Volume (mL) 679   VE Minute Ventilation (L/min) 20.9 L/min   Peak Inspiratory Pressure (cm H2O) 13   TiTOT (%) 37   Total Leak (L/Min) 15   Patient Trigger - ST Mode Only (%) 100   CPAP/BiPAP Alarms   High Pressure (cm H2O) 25   Low Pressure (cm H2O) 5   Minute Ventilation (L/Min) 3   High RR (breaths/min) 40   Low RR (breaths/min) 10   Apnea (Sec) 20

## 2024-02-20 NOTE — ASSESSMENT & PLAN NOTE
Patient with current diagnosis of pneumonia I have reviewed the pertinent imaging. Current antimicrobial regimen consists of   Antibiotics (From admission, onward)    Start     Stop Route Frequency Ordered    02/19/24 0845  azithromycin (ZITHROMAX) 500 mg in dextrose 5 % (D5W) 250 mL IVPB (Vial-Mate)         -- IV Every 24 hours (non-standard times) 02/19/24 0738    02/19/24 0745  cefTRIAXone (ROCEPHIN) 2 g in dextrose 5 % in water (D5W) 100 mL IVPB (MB+)         -- IV Every 24 hours (non-standard times) 02/19/24 0738      . Cultures drawn and noted-   Microbiology Results (last 7 days)     Procedure Component Value Units Date/Time    Blood culture x two cultures. Draw prior to antibiotics. [[id number]] Collected: 02/19/24 0145    Order Status: Completed Specimen: Blood from Peripheral, Forearm, Right Updated: 02/20/24 1032     Blood Culture, Routine No Growth to date      No Growth to date    Narrative:      Aerobic and anaerobic    Blood culture x two cultures. Draw prior to antibiotics. [[id number]] Collected: 02/19/24 0146    Order Status: Completed Specimen: Blood from Peripheral, Forearm, Left Updated: 02/20/24 1032     Blood Culture, Routine No Growth to date      No Growth to date    Narrative:      Aerobic and anaerobic    Influenza A & B by Molecular [[id number]] Collected: 02/19/24 0120    Order Status: Completed Specimen: Nasopharyngeal Swab Updated: 02/19/24 0142     Influenza A, Molecular Negative     Influenza B, Molecular Negative     Flu A & B Source NP       Will monitor patient closely and continue current treatment plan unchanged.    Check a urine Legionella, urine strep as well

## 2024-02-20 NOTE — ASSESSMENT & PLAN NOTE
Patient with Hypercapnic and Hypoxic Respiratory failure which is Acute on chronic.  she is not on home oxygen. Supplemental oxygen was provided and noted- Oxygen Concentration (%):  [40] 40    .   Signs/symptoms of respiratory failure include- tachypnea, increased work of breathing, and respiratory distress. Contributing diagnoses includes - CHF, COPD, and Pneumonia Labs and images were reviewed. Patient Has recent ABG, which has been reviewed. Will treat underlying causes and adjust management of respiratory failure as follows-     Continuous BiPAP  DuoNebs, steroids, and antibiotics     Patient oxygen machine and nebulizer machine was not functioning at home, discussed with case management we will request this upon discharge.

## 2024-02-20 NOTE — CARE UPDATE
02/19/24 1915 02/19/24 1924 02/19/24 1933   Patient Assessment/Suction   Level of Consciousness (AVPU) alert alert alert   Respiratory Effort Short of breath Short of breath Short of breath   Expansion/Accessory Muscles/Retractions accessory muscle use accessory muscle use accessory muscle use   All Lung Fields Breath Sounds Anterior:;Posterior:;coarse Anterior:;Posterior: Anterior:;Posterior:;crackles, coarse   PONCHO Breath Sounds crackles;coarse  --   --    LLL Breath Sounds crackles;coarse  --   --    RUL Breath Sounds diminished  --   --    RML Breath Sounds diminished  --   --    RLL Breath Sounds crackles  --   --    Skin Integrity   $ Wound Care Tech Time  --  15 min  --    Area Observed  --  Bridge of nose  --    Skin Appearance  --  redness blanchable  --    PRE-TX-O2   Device (Oxygen Therapy) nasal cannula nasal cannula nasal cannula   $ Is the patient on Low Flow Oxygen? Yes Yes Yes   Flow (L/min) 3 3 3   SpO2 95 %  --  95 %   Pulse Oximetry Type Intermittent  --  Intermittent   $ Pulse Oximetry - Multiple Charge Pulse Oximetry - Multiple  --  Pulse Oximetry - Multiple   Pulse (!) 58 60 61   Resp (!) 21 (!) 22 (!) 22   Aerosol Therapy   $ Aerosol Therapy Charges Aerosol Treatment  (duoneb) Aerosol Treatment  (pulmicort) Aerosol Treatment  (brovana)   Respiratory Treatment Status (SVN) given given given   Treatment Route (SVN) mask;air air;mask air;mask   Patient Position (SVN) HOB elevated HOB elevated HOB elevated   Post Treatment Assessment (SVN) breath sounds unchanged breath sounds unchanged breath sounds unchanged   Signs of Intolerance (SVN) none none none   Breath Sounds Post-Respiratory Treatment   Throughout All Fields Post-Treatment All Fields All Fields All Fields   Throughout All Fields Post-Treatment no change no change no change   Post-treatment Heart Rate (beats/min) 58 61 61   Post-treatment Resp Rate (breaths/min) 22 22 22   Preset CPAP/BiPAP Settings   Mode Of Delivery Standby  (will  wear after aero tx's)  --   --

## 2024-02-20 NOTE — CONSULTS
"Wound care consulted for redness to the  bridge of the nose. There is no redness at this assessment. Patient with oxygen via nasal cannula at this time.   Large hernia to the left abdomen skin beneath the fold is noted with dry flaky skin. Buttocks with dry skin as well. Right thumb noted with abrasion. Cleaned abrasion to right thumb with wound cleanser and patted dry. Applied a cut piece of Urgotul dressing and placed over the wound then secured with a foam mepilex dressing. The patient did not want the Triad to be applied to her abd fold area as she was concerned about making this area moist. When the skin was assessed to the abdominal fold area the patient had put a large amount of baby powder to this site as patient reports this area was "getting wet". Explained how Triad works and that this could be applied to the fold area the patient took the tube to review the product. Due to dry flaky skin to the fold area and no breaks in the skin although recommended to the patient to use the Triad it is ok if the patient refuses due to personal moisture concerns. Wound care will follow up as needed throughout her hospital stay.      Nose with pink areas noted.      Right anterior thumb abrasion    Right anterior thumb abrasion    Hernia hangs to the left of the abdomen          "

## 2024-02-20 NOTE — PLAN OF CARE
Pt and daughter are agreeable to  with no company preference. Packet sent to [facility] r/t payor source    Pcp hospital follow up scheduled         02/20/24 9625   Post-Acute Status   Post-Acute Authorization Home Health   Home Health Status Referrals Sent   Hospital Resources/Appts/Education Provided Appointments scheduled and added to AVS

## 2024-02-20 NOTE — CARE UPDATE
02/20/24 0653   Patient Assessment/Suction   Level of Consciousness (AVPU) alert   Respiratory Effort Shallow;Labored   Expansion/Accessory Muscles/Retractions expansion symmetric;no retractions;no use of accessory muscles   All Lung Fields Breath Sounds crackles, coarse   PONCHO Breath Sounds diminished   Rhythm/Pattern, Respiratory tachypneic   Cough Frequency infrequent   Cough Type nonproductive;congested   Skin Integrity   $ Wound Care Tech Time 15 min   Area Observed Bridge of nose;Cheek   Skin Appearance maroon/purple;redness blanchable   PRE-TX-O2   Device (Oxygen Therapy) nasal cannula   $ Is the patient on Low Flow Oxygen? Yes   Flow (L/min) 3   SpO2 (!) 94 %   Pulse Oximetry Type Intermittent   $ Pulse Oximetry - Multiple Charge Pulse Oximetry - Multiple   Pulse 64   Resp 18   Positioning HOB elevated 45 degrees   Aerosol Therapy   $ Aerosol Therapy Charges Aerosol Treatment   Daily Review of Necessity (SVN) completed   Respiratory Treatment Status (SVN) given   Treatment Route (SVN) mask   Patient Position (SVN) HOB elevated   Post Treatment Assessment (SVN) breath sounds unchanged   Signs of Intolerance (SVN) none   Preset CPAP/BiPAP Settings   Mode Of Delivery Standby;BiPAP   $ CPAP/BiPAP Daily Charge BiPAP/CPAP Daily   $ Initial CPAP/BiPAP Setup? No   Equipment Type V60

## 2024-02-20 NOTE — SUBJECTIVE & OBJECTIVE
Interval History:   Feel better, no fever chills no nausea vomiting diarrhea, breathing improved.         Review of Systems   Constitutional:  Negative for activity change and fever.   HENT:  Negative for ear discharge, facial swelling and sore throat.    Eyes:  Negative for photophobia, pain and visual disturbance.   Respiratory:  Positive for cough and shortness of breath. Negative for apnea.    Cardiovascular:  Negative for chest pain and leg swelling.   Gastrointestinal:  Negative for abdominal pain and blood in stool.   Endocrine: Negative for cold intolerance and heat intolerance.   Musculoskeletal:  Negative for back pain and gait problem.   Skin:  Negative for pallor and rash.   Neurological:  Negative for speech difficulty and headaches.   Psychiatric/Behavioral:  Negative for confusion, hallucinations and suicidal ideas.    All other systems reviewed and are negative.    Objective:     Vital Signs (Most Recent):  Temp: 97.9 °F (36.6 °C) (02/20/24 1138)  Pulse: 62 (02/20/24 1138)  Resp: 17 (02/20/24 1138)  BP: (!) 118/57 (02/20/24 1138)  SpO2: 98 % (02/20/24 1138) Vital Signs (24h Range):  Temp:  [97.1 °F (36.2 °C)-97.9 °F (36.6 °C)] 97.9 °F (36.6 °C)  Pulse:  [54-68] 62  Resp:  [16-24] 17  SpO2:  [91 %-98 %] 98 %  BP: ()/(46-58) 118/57     Weight: 132.6 kg (292 lb 5.3 oz)  Body mass index is 44.45 kg/m².  No intake or output data in the 24 hours ending 02/20/24 1219      Physical Exam  Constitutional:       Appearance: She is obese. She is ill-appearing.   HENT:      Head: Normocephalic and atraumatic.      Nose: Nose normal.   Eyes:      Extraocular Movements: Extraocular movements intact.      Pupils: Pupils are equal, round, and reactive to light.   Cardiovascular:      Rate and Rhythm: Normal rate and regular rhythm.      Heart sounds: No murmur heard.  Pulmonary:      Effort: Pulmonary effort is normal.      Breath sounds: Rhonchi present.   Abdominal:      General: Abdomen is flat.       Palpations: Abdomen is soft.      Hernia: A hernia is present.   Musculoskeletal:         General: Normal range of motion.      Cervical back: Normal range of motion and neck supple.   Skin:     General: Skin is warm and dry.   Neurological:      General: No focal deficit present.      Mental Status: She is alert and oriented to person, place, and time.   Psychiatric:         Mood and Affect: Mood normal.             Significant Labs: All pertinent labs within the past 24 hours have been reviewed.  CBC:   Recent Labs   Lab 02/19/24 0145 02/20/24 0358   WBC 12.86* 15.83*   HGB 12.9 11.7*   HCT 41.6 37.7    205       CMP:   Recent Labs   Lab 02/19/24 0145 02/20/24 0358    137   K 4.7 4.4    100   CO2 24 27   * 191*   BUN 15 23   CREATININE 0.6 0.7   CALCIUM 9.0 8.9   PROT 7.1  --    ALBUMIN 3.3*  --    BILITOT 0.6  --    ALKPHOS 89  --    AST 25  --    ALT 21  --    ANIONGAP 10 10         Significant Imaging: X-Ray Chest AP Portable    Result Date: 2/19/2024  EXAMINATION: XR CHEST AP PORTABLE CLINICAL HISTORY: Sepsis; TECHNIQUE: Single frontal view of the chest was performed. COMPARISON: 06/22/2023 FINDINGS: There is airspace disease in both lower lung zones.  Enlarged cardiac silhouette.  Atherosclerosis.  Obscured left hemidiaphragm.  No significant pleural fluid.  No pneumothorax.     Bibasilar airspace disease worse on the left.  This could reflect atelectasis pneumonia aspiration or a combination there of. Electronically signed by: Greg Samuels Date:    02/19/2024 Time:    08:53    CT Chest Without Contrast    Result Date: 2/19/2024  EXAMINATION: CT CHEST WITHOUT CONTRAST CLINICAL HISTORY: Pulmonary embolism (PE) suspected, high prob; TECHNIQUE: Low dose axial images, sagittal and coronal reformations were obtained from the thoracic inlet to the lung bases. Contrast was not administered. COMPARISON: None. FINDINGS: Respiratory motion artifact in the lungs limits fine detail. There  is a 2.2 cm nodular focal airspace opacity in the inferior right upper lobe series 6, image 196.  There is dependent atelectasis right middle and lower lobes.  There is diffuse airspace disease through the left lower lobe.  Right pleural fluid.  Enlarged heart with calcification of the aortic valve and coronary arteries.  9 mm hypodense lesion in the right thyroid gland.  Multiple enlarged lymph nodes in the mediastinum with a reference measuring 1.5 cm in the precarinal space series 2 image 44.  Hyperdensity along the left renal collecting system is presumably early excretion of contrast.  Partially imaged large left ventral abdominal hernia.  Multilevel spinal degenerative changes.     1. Patchy airspace disease left lower lobe suspicious for aspiration or pneumonia. 2. Nodular opacity in the right upper lobe could reflect additional sequela of the pneumonia/aspiration, although additional follow-up is recommended with CT in 3 months to exclude underlying neoplasm. 3. Mediastinal adenopathy is presumably reactive.  Recommend attention on follow-up imaging. 4. Enlarged heart with coronary artery disease. 5. Right thyroid nodule or cyst could be further characterized with elective ultrasound as clinically warranted. Electronically signed by: Greg Samuels Date:    02/19/2024 Time:    08:23    CT Abdomen Pelvis  Without Contrast    Result Date: 2/19/2024  EXAMINATION: CT ABDOMEN PELVIS WITHOUT CONTRAST CLINICAL HISTORY: LLQ abdominal pain; TECHNIQUE: Low dose axial images, sagittal and coronal reformations were obtained from the lung bases to the pubic symphysis.  Oral contrast was not administered. COMPARISON: 05/04/2023 FINDINGS: There is atelectasis and small volume pleural fluid at the right lung base.  There is diffuse patchy airspace disease at the left lung base.  Heart size is enlarged with calcified coronary artery plaque.  The gallbladder is minimally distended and there is a stone in the lumen which  measures 1.5 cm. There is hyperdensity along the renal papilla at multiple sites in each kidney.  Pancreas atrophy.  Evidence of remote infarct or trauma along the spleen.  Remaining solid abdominal organs are unremarkable. There is no enteric contrast which limits bowel assessment.  No dilated bowel loops.  Scattered colonic diverticula. There is a large left lateral abdominal wall hernia containing spleen pancreas tail part of the stomach and multiple nondilated loops of colon and small bowel.  The left kidney and tip of the left hepatic lobe extend to the neck of the defect.  Craniocaudad length of the hernia is 23 cm.  There is excretion of contrast in the urinary bladder and unremarkable uterus. Bilateral L5 pars defects.  There is 2 mm retrolisthesis L4 on L5 and 10 mm anterolisthesis L5 on S1.  Advanced multilevel degenerative changes in the lumbar spine worst at L5-S1.  At least moderate degenerative change in the right hip joint.     1. Left lower lobe airspace disease suspicious for pneumonia or aspiration. 2. Cardiomegaly with coronary artery disease. 3. Cholelithiasis. 4. Hyperdensity along the renal papillae is likely from early excretion of contrast. 5. Large left ventral hernia containing multiple abdominal organs.  No evidence for bowel obstruction.  This is a chronic finding. Electronically signed by: Greg Samuels Date:    02/19/2024 Time:    08:16  - pulls last radiology orders

## 2024-02-20 NOTE — PLAN OF CARE
Problem: Adult Inpatient Plan of Care  Goal: Plan of Care Review  Outcome: Ongoing, Progressing  Goal: Absence of Hospital-Acquired Illness or Injury  Outcome: Ongoing, Progressing  Goal: Optimal Comfort and Wellbeing  Outcome: Ongoing, Progressing     Problem: Diabetes Comorbidity  Goal: Blood Glucose Level Within Targeted Range  Outcome: Ongoing, Progressing     Problem: Fluid Imbalance (Pneumonia)  Goal: Fluid Balance  Outcome: Ongoing, Progressing     Problem: Infection (Pneumonia)  Goal: Resolution of Infection Signs and Symptoms  Outcome: Ongoing, Progressing     Problem: Respiratory Compromise (Pneumonia)  Goal: Effective Oxygenation and Ventilation  Outcome: Ongoing, Progressing     Problem: Skin Injury Risk Increased  Goal: Skin Health and Integrity  Outcome: Ongoing, Progressing     Problem: Bariatric Environmental Safety  Goal: Safety Maintained with Care  Outcome: Ongoing, Progressing

## 2024-02-20 NOTE — PT/OT/SLP EVAL
Physical Therapy Evaluation and Discharge Note    Patient Name:  Nelly Tian   MRN:  [id number]    Recommendations:     Discharge Recommendations: No Therapy Indicated  Discharge Equipment Recommendations: none   Barriers to discharge: None    Assessment:     Nelly Tian is a 73 y.o. female admitted with a medical diagnosis of Acute on chronic respiratory failure with hypoxia and hypercapnia. .  At this time, patient is functioning at their prior level of function and does not require further acute PT services. Patient reports she has been walking to bathroom in her room by herself and has had no problems.      Recent Surgery: * No surgery found *      Plan:     During this hospitalization, patient does not require further acute PT services.  Please re-consult if situation changes.      Subjective     Chief Complaint: fatigue  Patient/Family Comments/goals: go home  Pain/Comfort:       Patients cultural, spiritual, [employer] conflicts given the current situation:      Living Environment:  Currently lives with family in [address].  Prior to admission, patients level of function was independent.  Equipment used at home: cane, straight.  DME owned (not currently used): none.  Upon discharge, patient will have assistance from family.    Objective:     Communicated with nurse prior to session.  Patient found supine with telemetry upon PT entry to room.    General Precautions: Standard, fall    Orthopedic Precautions:N/A   Braces:    Respiratory Status: Room air    Exams:  RLE ROM: WFL  RLE Strength: WNL  LLE ROM: WFL  LLE Strength: WNL    Functional Mobility:  Bed Mobility:     Supine to Sit: independence  Sit to Supine: independence  Transfers:     Sit to Stand:  independence with no AD  Gait: x 40 feet no AD SBA/independence    AM-PAC 6 CLICK MOBILITY  Total Score:24       Treatment and Education:  None given    AM-PAC 6 CLICK MOBILITY  Total Score:24     Patient left supine with call button in  reach and nurse notified.    GOALS:   Multidisciplinary Problems       Physical Therapy Goals       Not on file                    History:     Past Medical History:   Diagnosis Date    *Atrial flutter     Angina pectoris 2017    Anxiety     Arthritis     Asthma     Atrial fibrillation     Back pain     Cataract     OD    Chest pain 2017    CHF (congestive heart failure)     Chronically on benzodiazepine therapy 2019    COPD (chronic obstructive pulmonary disease)     Depression     Diabetes mellitus     Emphysema of lung     Heart failure     Hepatomegaly 2/3/2016    Hernia     History of MI (myocardial infarction) 2016    Hypercapnic respiratory failure, chronic 2016    Hyperlipidemia     Hypertension     Iron deficiency anemia 2/3/2016    Myocardial infarction     Obesity     Peripheral vascular disease     Pneumonia     Polyneuropathy     Retinal detachment     OS    Septic shock 2017    Skin ulcer 3/18/2017    Tobacco dependence     Type II 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, not stated as uncontrolled(250.60)        Past Surgical History:   Procedure Laterality Date    BREAST BIOPSY Left 10 yrs ago    benign    CATARACT EXTRACTION Left     OS     CATARACT EXTRACTION W/  INTRAOCULAR LENS IMPLANT Right 2023    Procedure: CEIOL OD;  Surgeon: David Bautista MD;  Location: [facility];  Service: Ophthalmology;  Laterality: Right;     SECTION      x2    EYE SURGERY      HYSTERECTOMY      partial    OOPHORECTOMY      one ovary conserved    RETINAL DETACHMENT SURGERY      buckle --OS    TONSILLECTOMY         Time Tracking:     PT Received On: 24  PT Start Time: 823     PT Stop Time: 32  PT Total Time (min): 9 min     Billable Minutes: Evaluation 9      2024

## 2024-02-20 NOTE — CARE UPDATE
02/20/24 0657   Patient Assessment/Suction   Level of Consciousness (AVPU) alert   PRE-TX-O2   Device (Oxygen Therapy) nasal cannula   $ Is the patient on Low Flow Oxygen? Yes   Flow (L/min) 3   SpO2 95 %   Pulse Oximetry Type Intermittent   $ Pulse Oximetry - Multiple Charge Pulse Oximetry - Multiple   Pulse 67   Resp 18   Positioning HOB elevated 45 degrees   Aerosol Therapy   $ Aerosol Therapy Charges Aerosol Treatment  (.5 pulmicort)   Daily Review of Necessity (SVN) completed   Respiratory Treatment Status (SVN) given   Treatment Route (SVN) mask   Patient Position (SVN) HOB elevated   Post Treatment Assessment (SVN) breath sounds unchanged   Signs of Intolerance (SVN) none

## 2024-02-20 NOTE — ASSESSMENT & PLAN NOTE
Patient with Permanent atrial fibrillation which is controlled currently with Beta Blocker. Patient is currently in atrial fibrillation.[id number] Score: 4.  . Anticoagulation indicated. Anticoagulation done with Eliquis .

## 2024-02-20 NOTE — PROGRESS NOTES
[facility] Medicine  Progress Note    Patient Name: Nelly Tian  MRN: [id number]  Patient Class: IP- Inpatient   Admission Date: 2/19/2024  Length of Stay: 1 days  Attending Physician: Kvng Maloney MD  Primary Care Provider: Constantine Bird MD        Subjective:     Principal Problem:Acute on chronic respiratory failure with hypoxia and hypercapnia        HPI:  Nelly Tian is a 73 year old female with past medical history of AFib/a flutter on Eliquis, CHF, COPD, diabetes mellitus, recurrent abdominal cellulitis, hypertension, iron-deficiency anemia, hyperlipidemia, MI, anxiety, and arthritis who presents with shortness on breath associated with cough and lower abdominal pain for a couple hours.  She denies chest pain, fever, chills, dizziness, lightheadedness, nausea or vomiting.  ED workup significant for CT Chest large left and small right lung concerning for pneumonia, CT abdomen/pelvis chronic large abdominal wall hernia containing numerous organs without obstruction.  ECG atrial fibrillation with controlled rate, trop 0.011.  She is febrile 100.6, WBC 12.8, lactic 2.4, procal 0.04, and .  COVID/flu negative.  While in ED she was given DuoNebs, steroids, antibiotics, and placed on continuous BiPAP, ABGs improved pH 7.42, pCO2 47.2, PO2 89, pHCO3 30.8.  Admitted to hospital medicine for further management and treatment.          Overview/Hospital Course:  No notes on file    Interval History:   Feel better, no fever chills no nausea vomiting diarrhea, breathing improved.         Review of Systems   Constitutional:  Negative for activity change and fever.   HENT:  Negative for ear discharge, facial swelling and sore throat.    Eyes:  Negative for photophobia, pain and visual disturbance.   Respiratory:  Positive for cough and shortness of breath. Negative for apnea.    Cardiovascular:  Negative for chest pain and leg swelling.   Gastrointestinal:  Negative for  abdominal pain and blood in stool.   Endocrine: Negative for cold intolerance and heat intolerance.   Musculoskeletal:  Negative for back pain and gait problem.   Skin:  Negative for pallor and rash.   Neurological:  Negative for speech difficulty and headaches.   Psychiatric/Behavioral:  Negative for confusion, hallucinations and suicidal ideas.    All other systems reviewed and are negative.    Objective:     Vital Signs (Most Recent):  Temp: 97.9 °F (36.6 °C) (02/20/24 1138)  Pulse: 62 (02/20/24 1138)  Resp: 17 (02/20/24 1138)  BP: (!) 118/57 (02/20/24 1138)  SpO2: 98 % (02/20/24 1138) Vital Signs (24h Range):  Temp:  [97.1 °F (36.2 °C)-97.9 °F (36.6 °C)] 97.9 °F (36.6 °C)  Pulse:  [54-68] 62  Resp:  [16-24] 17  SpO2:  [91 %-98 %] 98 %  BP: ()/(46-58) 118/57     Weight: 132.6 kg (292 lb 5.3 oz)  Body mass index is 44.45 kg/m².  No intake or output data in the 24 hours ending 02/20/24 1219      Physical Exam  Constitutional:       Appearance: She is obese. She is ill-appearing.   HENT:      Head: Normocephalic and atraumatic.      Nose: Nose normal.   Eyes:      Extraocular Movements: Extraocular movements intact.      Pupils: Pupils are equal, round, and reactive to light.   Cardiovascular:      Rate and Rhythm: Normal rate and regular rhythm.      Heart sounds: No murmur heard.  Pulmonary:      Effort: Pulmonary effort is normal.      Breath sounds: Rhonchi present.   Abdominal:      General: Abdomen is flat.      Palpations: Abdomen is soft.      Hernia: A hernia is present.   Musculoskeletal:         General: Normal range of motion.      Cervical back: Normal range of motion and neck supple.   Skin:     General: Skin is warm and dry.   Neurological:      General: No focal deficit present.      Mental Status: She is alert and oriented to person, place, and time.   Psychiatric:         Mood and Affect: Mood normal.             Significant Labs: All pertinent labs within the past 24 hours have been  reviewed.  CBC:   Recent Labs   Lab 02/19/24  0145 02/20/24  0358   WBC 12.86* 15.83*   HGB 12.9 11.7*   HCT 41.6 37.7    205       CMP:   Recent Labs   Lab 02/19/24  0145 02/20/24  0358    137   K 4.7 4.4    100   CO2 24 27   * 191*   BUN 15 23   CREATININE 0.6 0.7   CALCIUM 9.0 8.9   PROT 7.1  --    ALBUMIN 3.3*  --    BILITOT 0.6  --    ALKPHOS 89  --    AST 25  --    ALT 21  --    ANIONGAP 10 10         Significant Imaging: X-Ray Chest AP Portable    Result Date: 2/19/2024  EXAMINATION: XR CHEST AP PORTABLE CLINICAL HISTORY: Sepsis; TECHNIQUE: Single frontal view of the chest was performed. COMPARISON: 06/22/2023 FINDINGS: There is airspace disease in both lower lung zones.  Enlarged cardiac silhouette.  Atherosclerosis.  Obscured left hemidiaphragm.  No significant pleural fluid.  No pneumothorax.     Bibasilar airspace disease worse on the left.  This could reflect atelectasis pneumonia aspiration or a combination there of. Electronically signed by: Greg Samuels Date:    02/19/2024 Time:    08:53    CT Chest Without Contrast    Result Date: 2/19/2024  EXAMINATION: CT CHEST WITHOUT CONTRAST CLINICAL HISTORY: Pulmonary embolism (PE) suspected, high prob; TECHNIQUE: Low dose axial images, sagittal and coronal reformations were obtained from the thoracic inlet to the lung bases. Contrast was not administered. COMPARISON: None. FINDINGS: Respiratory motion artifact in the lungs limits fine detail. There is a 2.2 cm nodular focal airspace opacity in the inferior right upper lobe series 6, image 196.  There is dependent atelectasis right middle and lower lobes.  There is diffuse airspace disease through the left lower lobe.  Right pleural fluid.  Enlarged heart with calcification of the aortic valve and coronary arteries.  9 mm hypodense lesion in the right thyroid gland.  Multiple enlarged lymph nodes in the mediastinum with a reference measuring 1.5 cm in the precarinal space series 2  image 44.  Hyperdensity along the left renal collecting system is presumably early excretion of contrast.  Partially imaged large left ventral abdominal hernia.  Multilevel spinal degenerative changes.     1. Patchy airspace disease left lower lobe suspicious for aspiration or pneumonia. 2. Nodular opacity in the right upper lobe could reflect additional sequela of the pneumonia/aspiration, although additional follow-up is recommended with CT in 3 months to exclude underlying neoplasm. 3. Mediastinal adenopathy is presumably reactive.  Recommend attention on follow-up imaging. 4. Enlarged heart with coronary artery disease. 5. Right thyroid nodule or cyst could be further characterized with elective ultrasound as clinically warranted. Electronically signed by: Greg Samuels Date:    02/19/2024 Time:    08:23    CT Abdomen Pelvis  Without Contrast    Result Date: 2/19/2024  EXAMINATION: CT ABDOMEN PELVIS WITHOUT CONTRAST CLINICAL HISTORY: LLQ abdominal pain; TECHNIQUE: Low dose axial images, sagittal and coronal reformations were obtained from the lung bases to the pubic symphysis.  Oral contrast was not administered. COMPARISON: 05/04/2023 FINDINGS: There is atelectasis and small volume pleural fluid at the right lung base.  There is diffuse patchy airspace disease at the left lung base.  Heart size is enlarged with calcified coronary artery plaque.  The gallbladder is minimally distended and there is a stone in the lumen which measures 1.5 cm. There is hyperdensity along the renal papilla at multiple sites in each kidney.  Pancreas atrophy.  Evidence of remote infarct or trauma along the spleen.  Remaining solid abdominal organs are unremarkable. There is no enteric contrast which limits bowel assessment.  No dilated bowel loops.  Scattered colonic diverticula. There is a large left lateral abdominal wall hernia containing spleen pancreas tail part of the stomach and multiple nondilated loops of colon and small  bowel.  The left kidney and tip of the left hepatic lobe extend to the neck of the defect.  Craniocaudad length of the hernia is 23 cm.  There is excretion of contrast in the urinary bladder and unremarkable uterus. Bilateral L5 pars defects.  There is 2 mm retrolisthesis L4 on L5 and 10 mm anterolisthesis L5 on S1.  Advanced multilevel degenerative changes in the lumbar spine worst at L5-S1.  At least moderate degenerative change in the right hip joint.     1. Left lower lobe airspace disease suspicious for pneumonia or aspiration. 2. Cardiomegaly with coronary artery disease. 3. Cholelithiasis. 4. Hyperdensity along the renal papillae is likely from early excretion of contrast. 5. Large left ventral hernia containing multiple abdominal organs.  No evidence for bowel obstruction.  This is a chronic finding. Electronically signed by: Greg Samuels Date:    02/19/2024 Time:    08:16  - pulls last radiology orders      Assessment/Plan:      * Acute on chronic respiratory failure with hypoxia and hypercapnia  Patient with Hypercapnic and Hypoxic Respiratory failure which is Acute on chronic.  she is not on home oxygen. Supplemental oxygen was provided and noted- Oxygen Concentration (%):  [40] 40    .   Signs/symptoms of respiratory failure include- tachypnea, increased work of breathing, and respiratory distress. Contributing diagnoses includes - CHF, COPD, and Pneumonia Labs and images were reviewed. Patient Has recent ABG, which has been reviewed. Will treat underlying causes and adjust management of respiratory failure as follows-     Continuous BiPAP  DuoNebs, steroids, and antibiotics     Patient oxygen machine and nebulizer machine was not functioning at home, discussed with case management we will request this upon discharge.       Flank hernia  Chronic condition note  Analgesic and antiemetics ordered   CT abdomen/pelvis chronic large abdominal wall hernia containing numerous organs without  obstruction      COPD (chronic obstructive pulmonary disease)  Patient's COPD is with exacerbation noted by worsening of baseline hypoxia currently.  Patient is currently off COPD Pathway. Continue scheduled inhalers Steroids, Antibiotics, and Supplemental oxygen and monitor respiratory status closely.     Type 2 diabetes mellitus with diabetic neuropathy, without long-term current use of insulin  Patient's FSGs are controlled on current medication regimen.  Last A1c reviewed-   Lab Results   Component Value Date    HGBA1C 6.0 02/19/2024     Most recent fingerstick glucose reviewed-   Recent Labs   Lab 02/19/24  2149 02/20/24  0224 02/20/24  0732 02/20/24  1136   POCTGLUCOSE 161* 154* 179* 187*       Current correctional scale  Medium  Maintain anti-hyperglycemic dose as follows-   Antihyperglycemics (From admission, onward)      Start     Stop Route Frequency Ordered    02/19/24 1036  insulin aspart U-100 pen 0-10 Units         -- SubQ Before meals & nightly PRN 02/19/24 0937    02/19/24 0538  insulin aspart U-100 pen 0-10 Units         -- SubQ Before meals & nightly PRN 02/19/24 0455          Hold Oral hypoglycemics while patient is in the hospital.        Chronic atrial fibrillation  Patient with Permanent atrial fibrillation which is controlled currently with Beta Blocker. Patient is currently in atrial fibrillation.[id number] Score: 4.  . Anticoagulation indicated. Anticoagulation done with Eliquis .    Pneumonia  Patient with current diagnosis of pneumonia I have reviewed the pertinent imaging. Current antimicrobial regimen consists of   Antibiotics (From admission, onward)      Start     Stop Route Frequency Ordered    02/19/24 0845  azithromycin (ZITHROMAX) 500 mg in dextrose 5 % (D5W) 250 mL IVPB (Vial-Mate)         -- IV Every 24 hours (non-standard times) 02/19/24 0738    02/19/24 0745  cefTRIAXone (ROCEPHIN) 2 g in dextrose 5 % in water (D5W) 100 mL IVPB (MB+)         -- IV Every 24 hours (non-standard  times) 02/19/24 0738        . Cultures drawn and noted-   Microbiology Results (last 7 days)       Procedure Component Value Units Date/Time    Blood culture x two cultures. Draw prior to antibiotics. [[id number]] Collected: 02/19/24 0145    Order Status: Completed Specimen: Blood from Peripheral, Forearm, Right Updated: 02/20/24 1032     Blood Culture, Routine No Growth to date      No Growth to date    Narrative:      Aerobic and anaerobic    Blood culture x two cultures. Draw prior to antibiotics. [[id number]] Collected: 02/19/24 0146    Order Status: Completed Specimen: Blood from Peripheral, Forearm, Left Updated: 02/20/24 1032     Blood Culture, Routine No Growth to date      No Growth to date    Narrative:      Aerobic and anaerobic    Influenza A & B by Molecular [[id number]] Collected: 02/19/24 0120    Order Status: Completed Specimen: Nasopharyngeal Swab Updated: 02/19/24 0142     Influenza A, Molecular Negative     Influenza B, Molecular Negative     Flu A & B Source NP         Will monitor patient closely and continue current treatment plan unchanged.    Check a urine Legionella, urine strep as well      VTE Risk Mitigation (From admission, onward)           Ordered     apixaban tablet 5 mg  2 times daily         02/19/24 0455     IP VTE HIGH RISK PATIENT  Once         02/19/24 0455     Place sequential compression device  Until discontinued         02/19/24 0455                    Discharge Planning   KRISTOPHER: 2/22/2024     Code Status: Full Code   Is the patient medically ready for discharge?:     Reason for patient still in hospital (select all that apply): Patient trending condition and Treatment  Discharge Plan A: Home Health                  Kvng Maloney MD  Department of Hospital Medicine   [facility]/Surg

## 2024-02-20 NOTE — ASSESSMENT & PLAN NOTE
Patient's FSGs are controlled on current medication regimen.  Last A1c reviewed-   Lab Results   Component Value Date    HGBA1C 6.0 02/19/2024     Most recent fingerstick glucose reviewed-   Recent Labs   Lab 02/19/24  2149 02/20/24  0224 02/20/24  0732 02/20/24  1136   POCTGLUCOSE 161* 154* 179* 187*       Current correctional scale  Medium  Maintain anti-hyperglycemic dose as follows-   Antihyperglycemics (From admission, onward)    Start     Stop Route Frequency Ordered    02/19/24 1036  insulin aspart U-100 pen 0-10 Units         -- SubQ Before meals & nightly PRN 02/19/24 0937    02/19/24 0538  insulin aspart U-100 pen 0-10 Units         -- SubQ Before meals & nightly PRN 02/19/24 0455        Hold Oral hypoglycemics while patient is in the hospital.

## 2024-02-20 NOTE — CARE UPDATE
02/20/24 0700   Patient Assessment/Suction   Level of Consciousness (AVPU) alert   PRE-TX-O2   Device (Oxygen Therapy) nasal cannula   $ Is the patient on Low Flow Oxygen? Yes   Flow (L/min) 3   SpO2 95 %   Pulse Oximetry Type Intermittent   $ Pulse Oximetry - Multiple Charge Pulse Oximetry - Multiple   Pulse 62   Resp 20   Positioning HOB elevated 45 degrees   Aerosol Therapy   $ Aerosol Therapy Charges Aerosol Treatment  (brovana)   Daily Review of Necessity (SVN) completed   Respiratory Treatment Status (SVN) given   Treatment Route (SVN) mask   Patient Position (SVN) HOB elevated   Post Treatment Assessment (SVN) breath sounds unchanged   Signs of Intolerance (SVN) none   Breath Sounds Post-Respiratory Treatment   Throughout All Fields Post-Treatment All Fields   Throughout All Fields Post-Treatment no change   Post-treatment Heart Rate (beats/min) 67   Post-treatment Resp Rate (breaths/min) 22

## 2024-02-21 VITALS
HEIGHT: 68 IN | RESPIRATION RATE: 20 BRPM | SYSTOLIC BLOOD PRESSURE: 142 MMHG | OXYGEN SATURATION: 94 % | WEIGHT: 292.31 LBS | BODY MASS INDEX: 44.3 KG/M2 | TEMPERATURE: 98 F | DIASTOLIC BLOOD PRESSURE: 65 MMHG | HEART RATE: 66 BPM

## 2024-02-21 LAB
BASOPHILS # BLD AUTO: 0.01 K/UL (ref 0–0.2)
BASOPHILS NFR BLD: 0.1 % (ref 0–1.9)
DIFFERENTIAL METHOD BLD: ABNORMAL
EOSINOPHIL # BLD AUTO: 0 K/UL (ref 0–0.5)
EOSINOPHIL NFR BLD: 0 % (ref 0–8)
ERYTHROCYTE [DISTWIDTH] IN BLOOD BY AUTOMATED COUNT: 14.4 % (ref 11.5–14.5)
HCT VFR BLD AUTO: 39.9 % (ref 37–48.5)
HGB BLD-MCNC: 12.2 G/DL (ref 12–16)
IMM GRANULOCYTES # BLD AUTO: 0.06 K/UL (ref 0–0.04)
IMM GRANULOCYTES NFR BLD AUTO: 0.4 % (ref 0–0.5)
LYMPHOCYTES # BLD AUTO: 0.6 K/UL (ref 1–4.8)
LYMPHOCYTES NFR BLD: 4.3 % (ref 18–48)
MCH RBC QN AUTO: 30.5 PG (ref 27–31)
MCHC RBC AUTO-ENTMCNC: 30.6 G/DL (ref 32–36)
MCV RBC AUTO: 100 FL (ref 82–98)
MONOCYTES # BLD AUTO: 0.7 K/UL (ref 0.3–1)
MONOCYTES NFR BLD: 4.4 % (ref 4–15)
NEUTROPHILS # BLD AUTO: 13.5 K/UL (ref 1.8–7.7)
NEUTROPHILS NFR BLD: 90.8 % (ref 38–73)
NRBC BLD-RTO: 0 /100 WBC
PLATELET # BLD AUTO: 198 K/UL (ref 150–450)
PMV BLD AUTO: 10.3 FL (ref 9.2–12.9)
RBC # BLD AUTO: 4 M/UL (ref 4–5.4)
WBC # BLD AUTO: 14.82 K/UL (ref 3.9–12.7)

## 2024-02-21 PROCEDURE — 25000242 PHARM REV CODE 250 ALT 637 W/ HCPCS: Performed by: NURSE PRACTITIONER

## 2024-02-21 PROCEDURE — 25000242 PHARM REV CODE 250 ALT 637 W/ HCPCS

## 2024-02-21 PROCEDURE — 27000221 HC OXYGEN, UP TO 24 HOURS

## 2024-02-21 PROCEDURE — 85025 COMPLETE CBC W/AUTO DIFF WBC: CPT | Performed by: HOSPITALIST

## 2024-02-21 PROCEDURE — 63600175 PHARM REV CODE 636 W HCPCS

## 2024-02-21 PROCEDURE — 63600175 PHARM REV CODE 636 W HCPCS: Performed by: HOSPITALIST

## 2024-02-21 PROCEDURE — 99900035 HC TECH TIME PER 15 MIN (STAT)

## 2024-02-21 PROCEDURE — 25000003 PHARM REV CODE 250: Performed by: HOSPITALIST

## 2024-02-21 PROCEDURE — 25000003 PHARM REV CODE 250

## 2024-02-21 PROCEDURE — 94640 AIRWAY INHALATION TREATMENT: CPT

## 2024-02-21 PROCEDURE — 36415 COLL VENOUS BLD VENIPUNCTURE: CPT | Performed by: HOSPITALIST

## 2024-02-21 PROCEDURE — 94761 N-INVAS EAR/PLS OXIMETRY MLT: CPT

## 2024-02-21 RX ORDER — AMOXICILLIN AND CLAVULANATE POTASSIUM 875; 125 MG/1; MG/1
1 TABLET, FILM COATED ORAL 2 TIMES DAILY
Qty: 10 TABLET | Refills: 0 | Status: SHIPPED | OUTPATIENT
Start: 2024-02-21 | End: 2024-02-26

## 2024-02-21 RX ORDER — METHYLPREDNISOLONE 4 MG/1
TABLET ORAL
Qty: 21 EACH | Refills: 0 | Status: SHIPPED | OUTPATIENT
Start: 2024-02-21 | End: 2024-02-23

## 2024-02-21 RX ADMIN — IPRATROPIUM BROMIDE AND ALBUTEROL SULFATE 3 ML: .5; 2.5 SOLUTION RESPIRATORY (INHALATION) at 01:02

## 2024-02-21 RX ADMIN — FUROSEMIDE 40 MG: 10 INJECTION, SOLUTION INTRAMUSCULAR; INTRAVENOUS at 08:02

## 2024-02-21 RX ADMIN — THERA TABS 1 TABLET: TAB at 08:02

## 2024-02-21 RX ADMIN — BUDESONIDE 0.5 MG: 0.5 INHALANT RESPIRATORY (INHALATION) at 07:02

## 2024-02-21 RX ADMIN — ATORVASTATIN CALCIUM 10 MG: 10 TABLET, FILM COATED ORAL at 08:02

## 2024-02-21 RX ADMIN — APIXABAN 5 MG: 2.5 TABLET, FILM COATED ORAL at 08:02

## 2024-02-21 RX ADMIN — IPRATROPIUM BROMIDE AND ALBUTEROL SULFATE 3 ML: .5; 2.5 SOLUTION RESPIRATORY (INHALATION) at 07:02

## 2024-02-21 RX ADMIN — IPRATROPIUM BROMIDE AND ALBUTEROL SULFATE 3 ML: .5; 2.5 SOLUTION RESPIRATORY (INHALATION) at 12:02

## 2024-02-21 RX ADMIN — CEFTRIAXONE SODIUM 2 G: 2 INJECTION, POWDER, FOR SOLUTION INTRAMUSCULAR; INTRAVENOUS at 08:02

## 2024-02-21 RX ADMIN — METHYLPREDNISOLONE SODIUM SUCCINATE 40 MG: 40 INJECTION, POWDER, FOR SOLUTION INTRAMUSCULAR; INTRAVENOUS at 05:02

## 2024-02-21 RX ADMIN — ACETAMINOPHEN 650 MG: 325 TABLET ORAL at 04:02

## 2024-02-21 RX ADMIN — LISINOPRIL 20 MG: 10 TABLET ORAL at 08:02

## 2024-02-21 RX ADMIN — SPIRONOLACTONE 25 MG: 25 TABLET ORAL at 08:02

## 2024-02-21 RX ADMIN — ARFORMOTEROL TARTRATE 15 MCG: 15 SOLUTION RESPIRATORY (INHALATION) at 07:02

## 2024-02-21 RX ADMIN — GABAPENTIN 800 MG: 400 CAPSULE ORAL at 08:02

## 2024-02-21 NOTE — CARE UPDATE
02/21/24 0702   Patient Assessment/Suction   Level of Consciousness (AVPU) alert   Respiratory Effort Normal   Expansion/Accessory Muscles/Retractions expansion symmetric   PONCHO Breath Sounds clear   LLL Breath Sounds crackles;diminished   RUL Breath Sounds clear   RML Breath Sounds clear   RLL Breath Sounds crackles;diminished   Rhythm/Pattern, Respiratory pattern regular   Cough Frequency no cough   Skin Integrity   $ Wound Care Tech Time 15 min   Area Observed Cheek;Nares   Skin Appearance without discoloration   PRE-TX-O2   Device (Oxygen Therapy) nasal cannula   $ Is the patient on Low Flow Oxygen? Yes   Flow (L/min) 3   SpO2 97 %   Pulse Oximetry Type Intermittent   $ Pulse Oximetry - Multiple Charge Pulse Oximetry - Multiple   Pulse (!) 55   Resp 18   Aerosol Therapy   $ Aerosol Therapy Charges Aerosol Treatment  (Duobeb)   Daily Review of Necessity (SVN) completed   Respiratory Treatment Status (SVN) given   Treatment Route (SVN) mask;air   Patient Position (SVN) semi-Pearson's   Signs of Intolerance (SVN) none   Breath Sounds Post-Respiratory Treatment   Throughout All Fields Post-Treatment no change   Post-treatment Heart Rate (beats/min) 62   Post-treatment Resp Rate (breaths/min) 18   Preset CPAP/BiPAP Settings   Mode Of Delivery Standby

## 2024-02-21 NOTE — PLAN OF CARE
Hospital follow up scheduled  Pt was accepted [facility] - 1st visit tomorrow 2/22  Pt was approved for nebulizer and 1 etank oxygen set up. CM to deliver to pt's room    Pt is clear for dc from CM after dme is delivered         02/21/24 1326   Final Note   Assessment Type Final Discharge Note   Anticipated Discharge Disposition Home-Health   Hospital Resources/Appts/Education Provided Appointments scheduled and added to AVS;Post-Acute resouces added to AVS   Post-Acute Status   Post-Acute Authorization HME;Home Health   HME Status (!) Pending Delivery   Home Health Status Set-up Complete/Auth obtained

## 2024-02-21 NOTE — PLAN OF CARE
Patient cleared for discharge from case management standpoint.      Hospital follow ups scheduled and placed on AVS.  Patient accepted by [facility] with start of care 02/22/2024.  Ok to pull 1 E-tank set up and nebulizer from [facility], per Zoran with [facility].  SW delivered listed equipment to patient's hospital room.  Patient signed delivery ticket and rental agreement.         02/21/24 1403   Final Note   Assessment Type Final Discharge Note   Anticipated Discharge Disposition Home-Health   What phone number can be called within the next 1-3 days to see how you are doing after discharge? [phone]   Hospital Resources/Appts/Education Provided Provided patient/caregiver with written discharge plan information;Provided education on problems/symptoms using teachback;Appointments scheduled and added to AVS;Post-Acute resouces added to AVS   Post-Acute Status   Post-Acute Authorization Home Health;HME   HME Status Set-up Complete/Auth obtained   Home Health Status Set-up Complete/Auth obtained   Discharge Delays None known at this time

## 2024-02-21 NOTE — HOSPITAL COURSE
Patient admitted to hospitalist for further management, patient was diagnosed with community-acquired pneumonia and COPD exacerbation, patient was placed on IV Rocephin azithromycin, DuoNeb  q.4 hours and IV steroids, further information also showed that patient home oxygen machine was malfunctioned.  Overall patient feel much better, now patient is stable for discharge.  Home oxygen and nebulizer machine is set up upon discharge.

## 2024-02-21 NOTE — CARE UPDATE
02/21/24 1130   Home Oxygen Qualification   $ Home O2 Qualification Tech time 15 minutes   Room Air SpO2 At Rest 90 %   Room Air SpO2 During Ambulation (!) 83 %   SpO2 During Ambulation on O2 92 %   Heart Rate on O2 123 bpm   Ambulation O2 LPM 3 LPM   SpO2 Post Ambulation 97 %   Post Ambulation Heart Rate 88 bpm   Post Ambulation O2 LPM 3 LPM   Home O2 Eval Comments Patient qualifies for home oxygen at 3 Lpm

## 2024-02-21 NOTE — PLAN OF CARE
Pt was accepted with [facility]. 1st visit tomorrow 2/22    DC pending o2 eval       02/21/24 1057   Post-Acute Status   Post-Acute Authorization Home Health;HME   HME Status Pending therapy documentation   Home Health Status Set-up Complete/Auth obtained

## 2024-02-21 NOTE — CARE UPDATE
02/20/24 1930   Patient Assessment/Suction   Level of Consciousness (AVPU) alert   Respiratory Effort Unlabored   Expansion/Accessory Muscles/Retractions no retractions;no use of accessory muscles   PONCHO Breath Sounds clear   LLL Breath Sounds clear   RUL Breath Sounds diminished   RML Breath Sounds diminished   RLL Breath Sounds diminished   Skin Integrity   $ Wound Care Tech Time 15 min   Area Observed Other (see comments)  (undernose and cheek)   Skin Appearance maroon/purple;redness blanchable   PRE-TX-O2   Device (Oxygen Therapy) BIPAP   $ Is the patient on High Flow Oxygen? Yes   Oxygen Concentration (%) 40   SpO2 97 %   Pulse Oximetry Type Intermittent   $ Pulse Oximetry - Multiple Charge Pulse Oximetry - Multiple   Pulse 71   Resp (!) 22   Aerosol Therapy   $ Aerosol Therapy Charges Aerosol Treatment  (duoneb)   Respiratory Treatment Status (SVN) given   Treatment Route (SVN) in-line   Patient Position (SVN) semi-Pearson's   Post Treatment Assessment (SVN) breath sounds unchanged   Signs of Intolerance (SVN) none   Breath Sounds Post-Respiratory Treatment   Throughout All Fields Post-Treatment All Fields   Throughout All Fields Post-Treatment no change   Post-treatment Heart Rate (beats/min) 70   Post-treatment Resp Rate (breaths/min) 22   Ready to Wean/Extubation Screen   FIO2<=50 (chart decimal) 0.4   Preset CPAP/BiPAP Settings   Mode Of Delivery BiPAP   $ Initial CPAP/BiPAP Setup? No   $ Is patient using? Yes   Size of Mask Small/Medium   Sized Appropriately? Yes   Equipment Type V60   Airway Device Type medium nasal mask   Ipap 12   EPAP (cm H2O) 6   Pressure Support (cm H2O) 6   Set Rate (Breaths/Min) 8   ITime (sec) 0.8   Rise Time (sec) 0.3   Patient CPAP/BiPAP Settings   RR Total (Breaths/Min) 24   Tidal Volume (mL) 706   VE Minute Ventilation (L/min) 16.4 L/min   Peak Inspiratory Pressure (cm H2O) 12   TiTOT (%) 33   Total Leak (L/Min) 15   Patient Trigger - ST Mode Only (%) 100   CPAP/BiPAP Alarms    High Pressure (cm H2O) 25   Low Pressure (cm H2O) 5   Minute Ventilation (L/Min) 3   High RR (breaths/min) 40   Low RR (breaths/min) 10   Apnea (Sec) 20

## 2024-02-21 NOTE — DISCHARGE SUMMARY
[facility]  Discharge Summary      Patient Name: Nelly Tian  MRN: [id number]  KELVIN: [id number]  Patient Class: IP- Inpatient  Admission Date: 2/19/2024  Hospital Length of Stay: 2 days  Discharge Date and Time: 2/21/2024  2:40 PM  Attending Physician: No att. providers found   Discharging Provider: Kvng Maloney MD  Primary Care Provider: Constantine Bird MD    Primary Care Team: Networked reference to record PCT     HPI:   Nelly Tian is a 73 year old female with past medical history of AFib/a flutter on Eliquis, CHF, COPD, diabetes mellitus, recurrent abdominal cellulitis, hypertension, iron-deficiency anemia, hyperlipidemia, MI, anxiety, and arthritis who presents with shortness on breath associated with cough and lower abdominal pain for a couple hours.  She denies chest pain, fever, chills, dizziness, lightheadedness, nausea or vomiting.  ED workup significant for CT Chest large left and small right lung concerning for pneumonia, CT abdomen/pelvis chronic large abdominal wall hernia containing numerous organs without obstruction.  ECG atrial fibrillation with controlled rate, trop 0.011.  She is febrile 100.6, WBC 12.8, lactic 2.4, procal 0.04, and .  COVID/flu negative.  While in ED she was given DuoNebs, steroids, antibiotics, and placed on continuous BiPAP, ABGs improved pH 7.42, pCO2 47.2, PO2 89, pHCO3 30.8.  Admitted to hospital medicine for further management and treatment.          * No surgery found *      Hospital Course:     Patient admitted to hospitalist for further management, patient was diagnosed with community-acquired pneumonia and COPD exacerbation, patient was placed on IV Rocephin azithromycin, DuoNeb  q.4 hours and IV steroids, further information also showed that patient home oxygen machine was malfunctioned.  Overall patient feel much better, now patient is stable for discharge.  Home oxygen and nebulizer machine is set up upon  "discharge.      Goals of Care Treatment Preferences:  Code Status: Full Code      Consults:     No new Assessment & Plan notes have been filed under this hospital service since the last note was generated.  Service: Hospital Medicine    Final Active Diagnoses:    Diagnosis Date Noted POA    PRINCIPAL PROBLEM:  Acute on chronic respiratory failure with hypoxia and hypercapnia [J96.21, J96.22] 02/19/2024 Yes    Flank hernia [K45.8] 07/14/2019 Yes    COPD (chronic obstructive pulmonary disease) [J44.9] 04/05/2017 Yes    Type 2 diabetes mellitus with diabetic neuropathy, without long-term current use of insulin [E11.40] 11/02/2016 Yes    Chronic atrial fibrillation [I48.20] 11/10/2015 Yes    Pneumonia [J18.9] 10/30/2013 Yes     Chronic      Problems Resolved During this Admission:       Discharged Condition: stable    Disposition: Home-Health Care [facility]    Follow Up:   Follow-up Information       Constantine Bird MD Follow up.    Specialty: Family Medicine  Why: 2/29 @ 11:30 am with NP  Contact information:  [address] [facility] [address]  [phone]               [facility] Follow [facility].    Why: 1st visit tomorrow 2/22  Contact information:  [address] [facility], [address] [address] [phone]             [facility], [facility] Follow up.    Specialty: DME Provider  Why: DME- home Oxygen with portable tank, nebulizer  Contact information:  [address] [address]  [facility] [address]  [phone]                           Patient Instructions:      NEBULIZER FOR HOME USE     Order Specific Question Answer Comments   Height: 5' 8" (1.727 m)    Weight: 132.6 kg (292 lb 5.3 oz)    Does patient have medical equipment at home? cane, straight    Length of need (1-99 months): 12      OXYGEN FOR HOME USE     Order Specific Question Answer Comments   Liter Flow 3    Duration Continuous    Qualifying Test Performed at: Activity    Oxygen saturation at rest 90    Oxygen saturation with " "activity 83    Oxygen saturation with activity on oxygen 92    Portable mode: continuous    Route nasal cannula    Device: home concentrator with portable tanks    Length of need (in months): 3 mos    Patient condition with qualifying saturation Other - List qualifying diagnosis and code    Select a diagnosis & list the code in the comments Pneumonia [[id number]]    Height: 5' 8" (1.727 m)    Weight: 132.6 kg (292 lb 5.3 oz)    Alternative treatment measures have been tried or considered and deemed clinically ineffective. Yes      Ambulatory referral/consult to Home Health   Standing Status: Future   Referral Priority: Routine Referral Type: Home Health   Referral Reason: Specialty Services Required   Requested Specialty: Home Health Services   Number of Visits Requested: 1     Notify your health care provider if you experience any of the following:  temperature >100.4     Notify your health care provider if you experience any of the following:  difficulty breathing or increased cough     Activity as tolerated       Significant Diagnostic Studies: Labs: CMP   Recent Labs   Lab 02/20/24  0358      K 4.4      CO2 27   *   BUN 23   CREATININE 0.7   CALCIUM 8.9   ANIONGAP 10    and CBC   Recent Labs   Lab 02/20/24  0358 02/21/24  0401   WBC 15.83* 14.82*   HGB 11.7* 12.2   HCT 37.7 39.9    198       Pending Diagnostic Studies:       Procedure Component Value Units Date/Time    Legionella antigen, urine [[id number]] Collected: 02/20/24 0403    Order Status: Sent Lab Status: In process Updated: 02/20/24 0457    Specimen: Urine, Clean Catch     Strep Pneumo AG Urine [[id number]] Collected: 02/20/24 0403    Order Status: Sent Lab Status: In process Updated: 02/20/24 0457    Specimen: Urine, Clean Catch            Medications:  Reconciled Home Medications:      Medication List        START taking these medications      amoxicillin-clavulanate 875-125mg 875-125 mg per tablet  Commonly known as: " AUGMENTIN  Take 1 tablet by mouth 2 (two) times daily. for 5 days     methylPREDNISolone 4 mg tablet  Commonly known as: MEDROL DOSEPACK  use as directed            CONTINUE taking these medications      albuterol 90 mcg/actuation inhaler  Commonly known as: PROVENTIL/VENTOLIN HFA  INHALE 2 PUFFS EVERY 6 HOURS AS NEEDED FOR WHEEZING (RESCUE)     ascorbic acid (vitamin C) 500 MG tablet  Commonly known as: VITAMIN C  Take 2 tablets (1,000 mg total) by mouth every evening.     atorvastatin 10 MG tablet  Commonly known as: LIPITOR  TAKE 1 TABLET(10 MG) BY MOUTH EVERY OTHER DAY     blood sugar diagnostic Strp  To check BG 2 times daily, to use with insurance preferred meter     blood-glucose meter kit  To check BG 2 times daily, to use with insurance preferred meter     ELIQUIS 5 mg Tab  Generic drug: apixaban  TAKE 1 TABLET TWICE DAILY     empagliflozin 25 mg tablet  Commonly known as: Jardiance  Take 1 tablet (25 mg total) by mouth once daily.     fluconazole 100 MG tablet  Commonly known as: DIFLUCAN  Take 2 tablets (200 mg total) by mouth once a week.     fluticasone propionate 50 mcg/actuation nasal spray  Commonly known as: FLONASE  SHAKE LIQUID AND USE 1 SPRAY IN EACH NOSTRIL EVERY DAY     furosemide 40 MG tablet  Commonly known as: LASIX  Take 1 tablet (40 mg total) by mouth once daily.     gabapentin 800 MG tablet  Commonly known as: NEURONTIN  TAKE 1 TABLET(800 MG) BY MOUTH THREE TIMES DAILY     HYDROcodone-acetaminophen 7.5-325 mg per tablet  Commonly known as: NORCO  Take 1 tablet by mouth every 12 (twelve) hours as needed for Pain. Medical necessary for greater than 7 days for chronic pain.     Lactobacillus acidophilus 1 billion cell Cap  Take 1 capsule by mouth 3 (three) times daily with meals.     lancets Misc  To check BG 2 times daily, to use with insurance preferred meter     LIDOcaine 5 %  Commonly known as: LIDODERM  APPLY PATCH ONTO THE SKIN. REMOVE AND DISCARD PATCH WITHIN 12 HOURS OR AS DIRECTED  BY MD     lisinopriL 20 MG tablet  Commonly known as: PRINIVILZESTRIL     melatonin 10 mg Tab  Take 10 mg by mouth nightly.     metFORMIN 500 MG tablet  Commonly known as: GLUCOPHAGE  TAKE 1 TABLET EVERY DAY WITH BREAKFAST     multivitamin tablet  Commonly known as: THERAGRAN  Take 1 tablet by mouth once daily.     nystatin powder  Commonly known as: NYSTOP  APPLY TOPICALLY TO THE AFFECTED AREA TWICE DAILY     nystatin-triamcinolone cream  Commonly known as: MYCOLOG II  Apply topically 3 (three) times daily. Apply to affected area     ondansetron 8 MG Tbdl  Commonly known as: ZOFRAN-ODT  Take 1 tablet (8 mg total) by mouth every 12 (twelve) hours as needed.     potassium chloride SA 20 MEQ tablet  Commonly known as: K-DUR,KLOR-CON  Take 1 tablet (20 mEq total) by mouth once daily.     spironolactone 25 MG tablet  Commonly known as: ALDACTONE  Take 1 tablet (25 mg total) by mouth once daily.     TRELEGY ELLIPTA 100-62.5-25 mcg Dsdv  Generic drug: fluticasone-umeclidin-vilanter  INHALE 1 PUFF EVERY DAY. EXPIRES 42 DAYS AFTER OPENING FOIL TRAY            STOP taking these medications      cefadroxil 500 MG Cap  Commonly known as: DURICEF            X-Ray Chest AP Portable    Result Date: 2/19/2024  EXAMINATION: XR CHEST AP PORTABLE CLINICAL HISTORY: Sepsis; TECHNIQUE: Single frontal view of the chest was performed. COMPARISON: 06/22/2023 FINDINGS: There is airspace disease in both lower lung zones.  Enlarged cardiac silhouette.  Atherosclerosis.  Obscured left hemidiaphragm.  No significant pleural fluid.  No pneumothorax.     Bibasilar airspace disease worse on the left.  This could reflect atelectasis pneumonia aspiration or a combination there of. Electronically signed by: Greg Samuels Date:    02/19/2024 Time:    08:53    CT Chest Without Contrast    Result Date: 2/19/2024  EXAMINATION: CT CHEST WITHOUT CONTRAST CLINICAL HISTORY: Pulmonary embolism (PE) suspected, high prob; TECHNIQUE: Low dose axial images,  sagittal and coronal reformations were obtained from the thoracic inlet to the lung bases. Contrast was not administered. COMPARISON: None. FINDINGS: Respiratory motion artifact in the lungs limits fine detail. There is a 2.2 cm nodular focal airspace opacity in the inferior right upper lobe series 6, image 196.  There is dependent atelectasis right middle and lower lobes.  There is diffuse airspace disease through the left lower lobe.  Right pleural fluid.  Enlarged heart with calcification of the aortic valve and coronary arteries.  9 mm hypodense lesion in the right thyroid gland.  Multiple enlarged lymph nodes in the mediastinum with a reference measuring 1.5 cm in the precarinal space series 2 image 44.  Hyperdensity along the left renal collecting system is presumably early excretion of contrast.  Partially imaged large left ventral abdominal hernia.  Multilevel spinal degenerative changes.     1. Patchy airspace disease left lower lobe suspicious for aspiration or pneumonia. 2. Nodular opacity in the right upper lobe could reflect additional sequela of the pneumonia/aspiration, although additional follow-up is recommended with CT in 3 months to exclude underlying neoplasm. 3. Mediastinal adenopathy is presumably reactive.  Recommend attention on follow-up imaging. 4. Enlarged heart with coronary artery disease. 5. Right thyroid nodule or cyst could be further characterized with elective ultrasound as clinically warranted. Electronically signed by: Greg Samuels Date:    02/19/2024 Time:    08:23    CT Abdomen Pelvis  Without Contrast    Result Date: 2/19/2024  EXAMINATION: CT ABDOMEN PELVIS WITHOUT CONTRAST CLINICAL HISTORY: LLQ abdominal pain; TECHNIQUE: Low dose axial images, sagittal and coronal reformations were obtained from the lung bases to the pubic symphysis.  Oral contrast was not administered. COMPARISON: 05/04/2023 FINDINGS: There is atelectasis and small volume pleural fluid at the right lung  base.  There is diffuse patchy airspace disease at the left lung base.  Heart size is enlarged with calcified coronary artery plaque.  The gallbladder is minimally distended and there is a stone in the lumen which measures 1.5 cm. There is hyperdensity along the renal papilla at multiple sites in each kidney.  Pancreas atrophy.  Evidence of remote infarct or trauma along the spleen.  Remaining solid abdominal organs are unremarkable. There is no enteric contrast which limits bowel assessment.  No dilated bowel loops.  Scattered colonic diverticula. There is a large left lateral abdominal wall hernia containing spleen pancreas tail part of the stomach and multiple nondilated loops of colon and small bowel.  The left kidney and tip of the left hepatic lobe extend to the neck of the defect.  Craniocaudad length of the hernia is 23 cm.  There is excretion of contrast in the urinary bladder and unremarkable uterus. Bilateral L5 pars defects.  There is 2 mm retrolisthesis L4 on L5 and 10 mm anterolisthesis L5 on S1.  Advanced multilevel degenerative changes in the lumbar spine worst at L5-S1.  At least moderate degenerative change in the right hip joint.     1. Left lower lobe airspace disease suspicious for pneumonia or aspiration. 2. Cardiomegaly with coronary artery disease. 3. Cholelithiasis. 4. Hyperdensity along the renal papillae is likely from early excretion of contrast. 5. Large left ventral hernia containing multiple abdominal organs.  No evidence for bowel obstruction.  This is a chronic finding. Electronically signed by: Greg Samuels Date:    02/19/2024 Time:    08:16  - pulls last radiology orders    Indwelling Lines/Drains at time of discharge:   Lines/Drains/Airways       None                   Time spent on the discharge of patient: 35 minutes         Kvng Maloney MD  Department of Hospital Medicine  [facility]/Surg

## 2024-02-21 NOTE — NURSING
Patient refusing dose of azithromycin and requesting for IV to be taken out so she can take a shower. Notified Dr. Maloney and states ok for IV to be out. No further needs at this time

## 2024-02-21 NOTE — PLAN OF CARE
Problem: Adult Inpatient Plan of Care  Goal: Plan of Care Review  Outcome: Ongoing, Progressing  Goal: Patient-Specific Goal (Individualized)  Outcome: Ongoing, Progressing  Goal: Absence of Hospital-Acquired Illness or Injury  Outcome: Ongoing, Progressing  Goal: Optimal Comfort and Wellbeing  Outcome: Ongoing, Progressing  Goal: Readiness for Transition of Care  Outcome: Ongoing, Progressing     Problem: Diabetes Comorbidity  Goal: Blood Glucose Level Within Targeted Range  Outcome: Ongoing, Progressing     Problem: Fluid Imbalance (Pneumonia)  Goal: Fluid Balance  Outcome: Ongoing, Progressing     Problem: Infection (Pneumonia)  Goal: Resolution of Infection Signs and Symptoms  Outcome: Ongoing, Progressing   Plan of care reviewed with patient,verbalized understanding.  HS bg 202, covered with ss insulin as per orders. Remains free from falls, injury. Instructed to call for assistance as needed ,verbalized understanding. Bed in low & locked position. Call light in reach, bed alarm on .

## 2024-02-21 NOTE — PLAN OF CARE
Home oxygen order sent to [facility].   [employer] rep is at lunch until 1230         02/21/24 1154   Post-Acute Status   Post-Acute Authorization E   E Status Referrals Sent

## 2024-02-22 LAB — L PNEUMO AG UR QL IA: NEGATIVE

## 2024-02-22 PROCEDURE — G0180 MD CERTIFICATION HHA PATIENT: HCPCS | Mod: ,,, | Performed by: FAMILY MEDICINE

## 2024-02-22 NOTE — TELEPHONE ENCOUNTER
Spoke to patient via phone who states she did receive both but she is upset she is having to pay for them. I did schedule her hosp f/u for Dr. Bird tomorrow. She states she was supposed to see him tomorrow and she is not sure why or how her appointment was canceled. She states she will discuss this matter further with Dr. Bird tomorrow at her appointment

## 2024-02-23 ENCOUNTER — HOSPITAL ENCOUNTER (OUTPATIENT)
Dept: RADIOLOGY | Facility: CLINIC | Age: 74
Discharge: HOME | End: 2024-02-23
Attending: FAMILY MEDICINE
Payer: MEDICARE

## 2024-02-23 ENCOUNTER — OFFICE VISIT (OUTPATIENT)
Dept: PRIMARY CARE CLINIC | Facility: CLINIC | Age: 74
End: 2024-02-23
Payer: MEDICARE

## 2024-02-23 VITALS
SYSTOLIC BLOOD PRESSURE: 158 MMHG | TEMPERATURE: 98 F | DIASTOLIC BLOOD PRESSURE: 72 MMHG | OXYGEN SATURATION: 97 % | HEART RATE: 75 BPM | BODY MASS INDEX: 44.45 KG/M2 | HEIGHT: 68 IN

## 2024-02-23 DIAGNOSIS — F33.9 MAJOR DEPRESSION, RECURRENT, CHRONIC: ICD-10-CM

## 2024-02-23 DIAGNOSIS — F11.20 UNCOMPLICATED OPIOID DEPENDENCE: ICD-10-CM

## 2024-02-23 DIAGNOSIS — E11.51 TYPE 2 DIABETES MELLITUS WITH DIABETIC PERIPHERAL ANGIOPATHY WITHOUT GANGRENE, WITHOUT LONG-TERM CURRENT USE OF INSULIN: ICD-10-CM

## 2024-02-23 DIAGNOSIS — J96.01 ACUTE RESPIRATORY FAILURE WITH HYPOXEMIA: Primary | ICD-10-CM

## 2024-02-23 DIAGNOSIS — E66.01 SEVERE OBESITY (BMI 35.0-35.9 WITH COMORBIDITY): ICD-10-CM

## 2024-02-23 DIAGNOSIS — J96.11 CHRONIC RESPIRATORY FAILURE WITH HYPOXIA: ICD-10-CM

## 2024-02-23 DIAGNOSIS — J96.01 ACUTE RESPIRATORY FAILURE WITH HYPOXEMIA: ICD-10-CM

## 2024-02-23 DIAGNOSIS — I50.22 CHRONIC SYSTOLIC CONGESTIVE HEART FAILURE: ICD-10-CM

## 2024-02-23 DIAGNOSIS — N25.81 HYPERPARATHYROIDISM, SECONDARY RENAL: ICD-10-CM

## 2024-02-23 DIAGNOSIS — M51.36 LUMBAR DEGENERATIVE DISC DISEASE: ICD-10-CM

## 2024-02-23 DIAGNOSIS — E83.52 PARATHYROID RELATED HYPERCALCEMIA: ICD-10-CM

## 2024-02-23 DIAGNOSIS — I50.30 DIASTOLIC CHF WITH PRESERVED LEFT VENTRICULAR FUNCTION, NYHA CLASS 2: ICD-10-CM

## 2024-02-23 DIAGNOSIS — Z51.81 THERAPEUTIC DRUG MONITORING: ICD-10-CM

## 2024-02-23 DIAGNOSIS — E21.5 PARATHYROID RELATED HYPERCALCEMIA: ICD-10-CM

## 2024-02-23 LAB
LEGIONELLA DNA SPEC NAA+PROBE: NEGATIVE
SPECIMEN SOURCE: NORMAL

## 2024-02-23 PROCEDURE — 3078F DIAST BP <80 MM HG: CPT | Mod: CPTII,S$GLB,, | Performed by: FAMILY MEDICINE

## 2024-02-23 PROCEDURE — 3288F FALL RISK ASSESSMENT DOCD: CPT | Mod: CPTII,S$GLB,, | Performed by: FAMILY MEDICINE

## 2024-02-23 PROCEDURE — 99215 OFFICE O/P EST HI 40 MIN: CPT | Mod: S$GLB,,, | Performed by: FAMILY MEDICINE

## 2024-02-23 PROCEDURE — 1101F PT FALLS ASSESS-DOCD LE1/YR: CPT | Mod: CPTII,S$GLB,, | Performed by: FAMILY MEDICINE

## 2024-02-23 PROCEDURE — 3044F HG A1C LEVEL LT 7.0%: CPT | Mod: CPTII,S$GLB,, | Performed by: FAMILY MEDICINE

## 2024-02-23 PROCEDURE — 71046 X-RAY EXAM CHEST 2 VIEWS: CPT | Mod: TC,FY,PO

## 2024-02-23 PROCEDURE — 1111F DSCHRG MED/CURRENT MED MERGE: CPT | Mod: CPTII,S$GLB,, | Performed by: FAMILY MEDICINE

## 2024-02-23 PROCEDURE — 1157F ADVNC CARE PLAN IN RCRD: CPT | Mod: CPTII,S$GLB,, | Performed by: FAMILY MEDICINE

## 2024-02-23 PROCEDURE — 4010F ACE/ARB THERAPY RXD/TAKEN: CPT | Mod: CPTII,S$GLB,, | Performed by: FAMILY MEDICINE

## 2024-02-23 PROCEDURE — 3008F BODY MASS INDEX DOCD: CPT | Mod: CPTII,S$GLB,, | Performed by: FAMILY MEDICINE

## 2024-02-23 PROCEDURE — 1160F RVW MEDS BY RX/DR IN RCRD: CPT | Mod: CPTII,S$GLB,, | Performed by: FAMILY MEDICINE

## 2024-02-23 PROCEDURE — 99999 PR PBB SHADOW E&M-EST. PATIENT-LVL IV: CPT | Mod: PBBFAC,,, | Performed by: FAMILY MEDICINE

## 2024-02-23 PROCEDURE — 71046 X-RAY EXAM CHEST 2 VIEWS: CPT | Mod: 26,,, | Performed by: RADIOLOGY

## 2024-02-23 PROCEDURE — 3077F SYST BP >= 140 MM HG: CPT | Mod: CPTII,S$GLB,, | Performed by: FAMILY MEDICINE

## 2024-02-23 PROCEDURE — 3072F LOW RISK FOR RETINOPATHY: CPT | Mod: CPTII,S$GLB,, | Performed by: FAMILY MEDICINE

## 2024-02-23 PROCEDURE — 1159F MED LIST DOCD IN RCRD: CPT | Mod: CPTII,S$GLB,, | Performed by: FAMILY MEDICINE

## 2024-02-23 RX ORDER — HYDROCODONE BITARTRATE AND ACETAMINOPHEN 7.5; 325 MG/1; MG/1
1 TABLET ORAL EVERY 12 HOURS PRN
Qty: 60 TABLET | Refills: 0 | Status: SHIPPED | OUTPATIENT
Start: 2024-02-29 | End: 2024-03-29 | Stop reason: SDUPTHER

## 2024-02-23 RX ORDER — FUROSEMIDE 40 MG/1
40 TABLET ORAL DAILY
Qty: 90 TABLET | Refills: 9 | Status: SHIPPED | OUTPATIENT
Start: 2024-02-23

## 2024-02-23 RX ORDER — SPIRONOLACTONE 25 MG/1
25 TABLET ORAL
Qty: 90 TABLET | Refills: 4
Start: 2024-02-23

## 2024-02-23 RX ORDER — IPRATROPIUM BROMIDE AND ALBUTEROL SULFATE 2.5; .5 MG/3ML; MG/3ML
3 SOLUTION RESPIRATORY (INHALATION) EVERY 6 HOURS PRN
Qty: 75 ML | Refills: 0 | Status: SHIPPED | OUTPATIENT
Start: 2024-02-23 | End: 2025-02-22

## 2024-02-23 RX ORDER — TIRZEPATIDE 2.5 MG/.5ML
2.5 INJECTION, SOLUTION SUBCUTANEOUS
Qty: 4 PEN | Refills: 2 | Status: SHIPPED | OUTPATIENT
Start: 2024-02-23 | End: 2024-05-06 | Stop reason: SDUPTHER

## 2024-02-23 RX ORDER — HYDROXYZINE HYDROCHLORIDE 50 MG/1
50 TABLET, FILM COATED ORAL NIGHTLY PRN
Qty: 90 TABLET | Refills: 2 | Status: SHIPPED | OUTPATIENT
Start: 2024-02-23 | End: 2024-05-14

## 2024-02-23 RX ORDER — LISINOPRIL 20 MG/1
20 TABLET ORAL DAILY
Qty: 90 TABLET | Refills: 3 | Status: SHIPPED | OUTPATIENT
Start: 2024-02-23

## 2024-02-23 RX ORDER — LACTULOSE 10 G/15ML
10 SOLUTION ORAL DAILY
Qty: 450 ML | Refills: 2 | Status: SHIPPED | OUTPATIENT
Start: 2024-02-23 | End: 2024-05-06 | Stop reason: SDUPTHER

## 2024-02-23 RX ORDER — BUDESONIDE 0.25 MG/2ML
0.25 INHALANT ORAL DAILY
Qty: 20 EACH | Refills: 3 | Status: ON HOLD | OUTPATIENT
Start: 2024-02-23 | End: 2024-04-22 | Stop reason: SDUPTHER

## 2024-02-23 NOTE — PROGRESS NOTES
[facility]     Subjective:    Chief Complaint:   Chief Complaint   Patient presents with    Hospital Follow Up       History of Present Illness:  73 y.o. female presents for multiple issues. HPI:   Nelly Tian is a 73 year old female with past medical history of AFib/a flutter on Eliquis, CHF, COPD, diabetes mellitus, recurrent abdominal cellulitis, hypertension, iron-deficiency anemia, hyperlipidemia, MI, anxiety, and arthritis who presents with shortness on breath associated with cough and lower abdominal pain for a couple hours.  She denies chest pain, fever, chills, dizziness, lightheadedness, nausea or vomiting.  ED workup significant for CT Chest large left and small right lung concerning for pneumonia, CT abdomen/pelvis chronic large abdominal wall hernia containing numerous organs without obstruction.  ECG atrial fibrillation with controlled rate, trop 0.011.  She is febrile 100.6, WBC 12.8, lactic 2.4, procal 0.04, and .  COVID/flu negative.  While in ED she was given DuoNebs, steroids, antibiotics, and placed on continuous BiPAP, ABGs improved pH 7.42, pCO2 47.2, PO2 89, pHCO3 30.8.  Admitted to hospital medicine for further management and treatment.      Transitional Care Note    Family and/or Caretaker present at visit?  No.  Diagnostic tests reviewed/disposition: No diagnosic tests pending after this hospitalization.  Disease/illness education: yes  Home health/community services discussion/referrals: Patient does not have home health established from hospital visit.  They do not need home health.  If needed, we will set up home health for the patient.   Establishment or re-establishment of referral orders for community resources: No other necessary community resources.   Discussion with other health care providers: No discussion with other health care providers necessary.               * No surgery found *       Hospital Course:      Patient admitted to hospitalist for further  management, patient was diagnosed with community-acquired pneumonia and COPD exacerbation, patient was placed on IV Rocephin azithromycin, DuoNeb  q.4 hours and IV steroids, further information also showed that patient home oxygen machine was malfunctioned.  Overall patient feel much better, now patient is stable for discharge.  Home oxygen and nebulizer machine is set up upon discharge.       Goals of Care Treatment Preferences:  Code Status: Full Code           I reviewed the patients chart dating back for the past few appointments. See above.    The following portions of the patient's history were reviewed and updated as appropriate: allergies, current medications, past family history, past medical history, past social history, past surgical history and problem list.    She denies chest pain upon exertion, dyspnea, nausea, vomiting, diaphoresis, and syncope. No pleuritic chest pain, unilateral leg swelling, calf tenderness, or calf pain.      Past Medical History:   Diagnosis Date    *Atrial flutter     Angina pectoris 9/18/2017    Anxiety     Arthritis     Asthma     Atrial fibrillation     Back pain     Cataract     OD    Chest pain 9/17/2017    CHF (congestive heart failure)     Chronically on benzodiazepine therapy 5/4/2019    COPD (chronic obstructive pulmonary disease)     Depression     Diabetes mellitus     Emphysema of lung     Heart failure     Hepatomegaly 2/3/2016    Hernia     History of MI (myocardial infarction) 1/19/2016    Hypercapnic respiratory failure, chronic 11/16/2016    Hyperlipidemia     Hypertension     Iron deficiency anemia 2/3/2016    Myocardial infarction     Obesity     Peripheral vascular disease     Pneumonia     Polyneuropathy     Retinal detachment     OS    Septic shock 4/23/2017    Skin ulcer 3/18/2017    Tobacco dependence     Type II 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, not stated as uncontrolled(250.60)        Past Surgical History:    Procedure Laterality Date    BREAST BIOPSY Left 10 yrs ago    benign    CATARACT EXTRACTION Left     OS     CATARACT EXTRACTION W/  INTRAOCULAR LENS IMPLANT Right 2023    Procedure: CEIOL OD;  Surgeon: David Bautista MD;  Location: [facility] OR;  Service: Ophthalmology;  Laterality: Right;     SECTION      x2    EYE SURGERY      HYSTERECTOMY      partial    OOPHORECTOMY      one ovary conserved    RETINAL DETACHMENT SURGERY      buckle --OS    TONSILLECTOMY         Social History  Social History     Tobacco Use    Smoking status: Former     Current packs/day: 0.00     Average packs/day: 0.3 packs/day for 54.4 years (16.3 ttl pk-yrs)     Types: Cigarettes     Start date: 1968     Quit date: 2022     Years since quittin.6    Smokeless tobacco: Never   Substance Use Topics    Alcohol use: No     Alcohol/week: 0.0 standard drinks of alcohol    Drug use: No       Family History   Problem Relation Age of Onset    Alcohol abuse Mother     Cirrhosis Mother     Arthritis Father     Heart disease Father     Heart attack Father     Arrhythmia Father     Coronary artery disease Father     Coronary artery disease Sister     Early death Sister 30        heart disease    Heart disease Sister 32        MI    Heart attack Sister     Arthritis Sister     Heart disease Sister     Crohn's disease Sister     Cancer Brother         Lung cancer    Lung cancer Brother     No Known Problems Brother     No Known Problems Daughter     Blindness Son         due to accident//    Breast cancer Neg Hx     Glaucoma Neg Hx     Macular degeneration Neg Hx     Retinal detachment Neg Hx     Amblyopia Neg Hx     Diabetes Neg Hx     Cataracts Neg Hx     Hypertension Neg Hx     Strabismus Neg Hx     Stroke Neg Hx     Thyroid disease Neg Hx      Review of patient's allergies indicates:   Allergen Reactions    Dilaudid [hydromorphone] Anaphylaxis     Other reaction(s): Anaphylaxis  Other reaction(s): Unknown    Zyvox [linezolid  in dextrose 5%] Shortness Of Breath     Is the problem list complete?:Yes  Is the medication list complete?:Yes  Is the family history complete?:Yes  Do we have a list of the patient's other physicians?:Yes    Depression screen completed?:Yes  Functional assessment completed?:Yes  BP, weight, BMI measured?:Yes  Risk factors identified?:Yes  Immunizations updated and ordered if indicated?:Yes  Preventive checklist updated?:Yes  New schedule of preventive and early detection interventions made?:Yes  Advance directives discussed?:Yes  Referrals made?:No Referrals to:  n/a    Patient received a printed copy of his /her personalized prevention plan given?:No  Printed materials on Advanced directives given?:Yes  All copies of screening paperwork were submitted to be scanned.       4Ms for Medical Decision-Making in Older Adults    Last Completed EAWV: 2022    MOBILITY:  Get Up and Go:      2018    11:43 AM 2017    12:31 PM   Get Up and Go   Trial 1 13 seconds 15 seconds     Activities of Daily Livin/28/2022     3:07 PM   Activities of Daily Living   Ambulation Independent   Dressing Independent   Transfers Independent   Toileting Continent of bladder;Continent of bowel   Feeding Independent   Cleaning home/Chores Independent   Telephone use Independent   Shopping Independent   Paying bills Independent   Taking meds Independent     Whisper Test:      2018    11:38 AM   Whisper Test   Whisper Test Normal     Disability Status:      2023     9:44 AM   Disability Status   Are you deaf or do you have serious difficulty hearing? N   Are you blind or do you have serious difficulty seeing, even when wearing glasses? N   Because of a physical, mental, or emotional condition, do you have serious difficulty concentrating, remembering, or making decisions? N   Do you have serious difficulty walking or climbing stairs? Y   Do you have difficulty dressing or bathing? N   Because of a physical,  mental, or emotional condition, do you have difficulty doing errands alone such as visiting a doctor's office or shopping? Y     Nutrition Screenin/28/2022     3:07 PM   Nutrition Screening   Has food intake declined over the past three months due to loss of appetite, digestive problems, chewing or swallowing difficulties? No decrease in food intake   Involuntary weight loss during the last 3 months? No weight loss   Mobility? Goes out   Has the patient suffered psychological stress or acute disease in the past three months? No   Neuropsychological problems? No psychological problems   Body Mass Index (BMI)?  BMI 23 or greater   Screening Score 14   Interpretation Normal nutritional status    Screening Score: 0-7 Malnourished, 8-11 At Risk, 12-14 Normal    MENTATION:   Depression Patient Health Questionnaire:      2024    10:50 AM   Depression Patient Health Questionnaire   Over the last two weeks how often have you been bothered by little interest or pleasure in doing things Not at all   Over the last two weeks how often have you been bothered by feeling down, depressed or hopeless Several days   PHQ-2 Total Score 1     Has Dementia Dx: No    Cognitive Function Screenin/28/2022     3:10 PM   Cognitive Function Screening   Mini-Cog 3 Minute Recall 3   Cognitive Function Screening 5     Cognitive Function Screening Total - Less than 4 = Abnormal,  Greater than or equal to 4 = Normal    MEDICATIONS:  High Risk Medications:  Total Active Medications: 1  gabapentin - 800 MG  HYDROcodone-acetaminophen - 7.5-325 mg    WHAT MATTERS MOST:  Advance Care Planning   ACP Status:   Patient has had an ACP conversation  Living Will: No  Power of : No  La[place]: No    What is most important right now is to focus on remaining as independent as possible, symptom/pain control, and quality of life, even if it means sacrificing a little time    Accordingly, we have decided that the best plan to meet the  "patient's goals includes continuing with treatment      What matters most to patient today is: activity             Review of Systems [Negative unless checked off]    General ROS: []fever, []chills, []weight loss, [x]malaise/fatigue.  ENT ROS: []congestion, []rhinorrhea,  []sore throat, []neck pain, []hearing loss.  Ophthalmic ROS:[]blurry vision, [] double vision, []photophobia, []eye pain.  Respiratory ROS: []cough, []pleuritic chest pain, [x]shortness of breath, []wheezing.  CVS ROS:[]chest pain, [x]dyspnea on exertion, []palpitations, []orthopnea, []leg swelling, []PND.   GI ROS: []nausea, []vomiting, [] epigastric pain, []abd pain, []diarrhea, []constipation, []blood/melena in stool.   Urology ROS:[]dysuria, []frequency, []flank pain,[] trouble voiding, [] hematuria.   MSK ROS: []myalgias, []joint pain, []muscular weakness,  []back pain, [] falls.   Derm ROS: []pruritis, []rash, []jaundice.  Neurological:[]dizziness,[]numbness,[]loss of consciousness, []weakness  []headaches.   Psych ROS: []hallucinations, []depression, []anxiety, []suicidal ideation.    Physical Examination  BP (!) 158/72 (BP Location: Left arm, Patient Position: Sitting, BP Method: Large (Manual))   Pulse 75   Temp 97.9 °F (36.6 °C)   Ht 5' 8" (1.727 m)   LMP  (LMP Unknown)   SpO2 97% Comment: 2 1/2 liters  BMI 44.45 kg/m²   Wt Readings from Last 3 Encounters:   02/19/24 132.6 kg (292 lb 5.3 oz)   10/26/23 122.7 kg (270 lb 6.4 oz)   07/27/23 115.7 kg (255 lb)     BP Readings from Last 3 Encounters:   02/23/24 (!) 158/72   02/21/24 (!) 142/65   10/26/23 (!) 146/72     Estimated body mass index is 44.45 kg/m² as calculated from the following:    Height as of this encounter: 5' 8" (1.727 m).    Weight as of 2/19/24: 132.6 kg (292 lb 5.3 oz).     General appearance: alert, cooperative, no distress. obese  Eyes: pupils equal and reactive, extraocular eye movements intact, sclera anicteric, left eye normal, right eye normal   Ears: bilateral " TM's and external ear canals normal   Nose: normal and patent, no erythema, discharge or polyps   Sinuses: Normal paranasal sinuses without tenderness   Throat: mucous membranes moist, pharynx normal without lesions   Neck: no thyromegaly, trachea midline  Lungs: clear to auscultation, no wheezes, rales or rhonchi, symmetric air entry, prolonged expiratory phase, no dullness to percussion bilaterally.  Decreased breath sounds on both bases  Heart: normal rate, regular rhythm, normal S1, S2, no murmurs, rubs, clicks or gallops, normal rate and regular rhythm, S1 and S2 normal, no murmurs noted, no gallops noted, no displacement of the PMI.  Abdomen: soft, nontender, nondistended, no masses or organomegaly  Huge huge ventral hernia, no signs of cellulitis No rigidity, rebound, or guarding.   Back: full range of motion, no tenderness, palpable spasm or pain on motion   Extremities: peripheral pulses normal, no pedal edema, no clubbing or cyanosis, no pedal edema noted, no ulcers, gangrene or atrophic changes   Feet: warm, good capillary refill.  Neurological:alert, oriented, normal speech, no focal findings or movement disorder noted, screening mental status exam normal, neck supple without rigidity, cranial nerves II through XII intact   Psychiatric: alert, oriented to person, place, and time, anxious  Integument: normal coloration and turgor, no rashes, no suspicious skin lesions noted.    Data reviewed    Previous medical records reviewed and summarized above in HPI. 274}    Laboratory    I have reviewed old labs below:   274}  Lab Results   Component Value Date    WBC 14.82 (H) 02/21/2024    HGB 12.2 02/21/2024    HCT 39.9 02/21/2024     (H) 02/21/2024     02/21/2024     02/20/2024    K 4.4 02/20/2024     02/20/2024    CALCIUM 8.9 02/20/2024    PHOS 3.8 06/26/2023    CO2 27 02/20/2024     (H) 02/20/2024    BUN 23 02/20/2024    CREATININE 0.7 02/20/2024    ANIONGAP 10 02/20/2024     ESTGFRAFRICA >60.0 07/22/2022    EGFRNONAA >60.0 07/22/2022    PROT 7.1 02/19/2024    ALBUMIN 3.3 (L) 02/19/2024    BILITOT 0.6 02/19/2024    ALKPHOS 89 02/19/2024    ALT 21 02/19/2024    AST 25 02/19/2024    INR 1.0 06/27/2019    CHOL 89 (L) 03/25/2022    TRIG 52 03/25/2022    HDL 37 (L) 03/25/2022    LDLCALC 41.6 (L) 03/25/2022    TSH 1.309 01/11/2021    HGBA1C 6.0 02/19/2024       Imaging/Tracing: I have reviewed the pertinent results/findings and my personal findings are below:    X-Ray Chest PA And Lateral  Narrative: EXAMINATION:  XR CHEST PA AND LATERAL    CLINICAL HISTORY:  pneumonia; Acute respiratory failure with hypoxia    TECHNIQUE:  PA and lateral views of the chest were performed.    COMPARISON:  02/19/2024    FINDINGS:  There is moderate cardiomegaly which is unchanged in degree.  The pulmonary vessels do not appear congested.  There is elevation of the right diaphragm.  On the lateral view, there is focal consolidation of the right lung base suggestive of pneumonitis.  There may also be a small right pleural effusion.  No pneumothorax is identified.  Skeletal structures are intact.  Impression: 1. Cardiomegaly which is unchanged in degree.  2. Possible right lower lobe pneumonia and small right pleural effusion.    Electronically signed by: Lucas Miles MD  Date:    02/23/2024  Time:    13:11    274}    Assessment/Plan     274}    Nelly Tian is a 73 y.o. female who presents to clinic with:    1. Acute respiratory failure with hypoxemia    2. Severe obesity (BMI 35.0-35.9 with comorbidity)    3. Uncomplicated opioid dependence    4. Chronic respiratory failure with hypoxia    5. Parathyroid related hypercalcemia    6. Major depression, recurrent, chronic    7. Type 2 diabetes mellitus with diabetic peripheral angiopathy without gangrene, without long-term current use of insulin    8. Chronic systolic congestive heart failure    9. Lumbar degenerative disc disease    10. Therapeutic drug  monitoring    11. Diastolic CHF with preserved left ventricular function, NYHA class 2    12. Hyperparathyroidism, secondary renal         Diagnostic impression remarks       1. Acute respiratory failure with hypoxemia  - X-Ray Chest PA And Lateral; Future  - Ambulatory referral/consult to Outpatient Case Management    2. Severe obesity (BMI 35.0-35.9 with comorbidity)    3. Uncomplicated opioid dependence    4. Chronic respiratory failure with hypoxia    5. Parathyroid related hypercalcemia  - Vitamin D 25 hydroxy; Future  - PTH, intact; Future  - Phosphorus; Future  - Calcium, ionized; Future    6. Major depression, recurrent, chronic  - hydrOXYzine HCL (ATARAX) 50 MG tablet; Take 1 tablet (50 mg total) by mouth nightly as needed for Itching.  Dispense: 90 tablet; Refill: 2    7. Type 2 diabetes mellitus with diabetic peripheral angiopathy without gangrene, without long-term current use of insulin  - tirzepatide (MOUNJARO) 2.5 mg/0.5 mL PnIj; Inject 2.5 mg into the skin every 7 days.  Dispense: 4 pen ; Refill: 2    8. Chronic systolic congestive heart failure  - X-Ray Chest PA And Lateral; Future  - furosemide (LASIX) 40 MG tablet; Take 1 tablet (40 mg total) by mouth once daily.  Dispense: 90 tablet; Refill: 9  - lisinopriL (PRINIVIL,ZESTRIL) 20 MG tablet; Take 1 tablet (20 mg total) by mouth once daily.  Dispense: 90 tablet; Refill: 3  - spironolactone (ALDACTONE) 25 MG tablet; Take 1 tablet (25 mg total) by mouth 3 (three) times a week.  Dispense: 90 tablet; Refill: 4    9. Lumbar degenerative disc disease  - HYDROcodone-acetaminophen (NORCO) 7.5-325 mg per tablet; Take 1 tablet by mouth every 12 (twelve) hours as needed for Pain. Medical necessary for greater than 7 days for chronic pain.  Dispense: 60 tablet; Refill: 0    10. Therapeutic drug monitoring  - HYDROcodone-acetaminophen (NORCO) 7.5-325 mg per tablet; Take 1 tablet by mouth every 12 (twelve) hours as needed for Pain. Medical necessary for greater  than 7 days for chronic pain.  Dispense: 60 tablet; Refill: 0  - lactulose (CHRONULAC) 20 gram/30 mL Soln; Take 15 mLs (10 g total) by mouth once daily.  Dispense: 450 mL; Refill: 2    11. Diastolic CHF with preserved left ventricular function, NYHA class 2  - furosemide (LASIX) 40 MG tablet; Take 1 tablet (40 mg total) by mouth once daily.  Dispense: 90 tablet; Refill: 9  - spironolactone (ALDACTONE) 25 MG tablet; Take 1 tablet (25 mg total) by mouth 3 (three) times a week.  Dispense: 90 tablet; Refill: 4  - X-Ray Chest PA And Lateral; Future    12. Hyperparathyroidism, secondary renal  - Vitamin D 25 hydroxy; Future  - PTH, intact; Future  - Phosphorus; Future  - Calcium, ionized; Future  - albuterol-ipratropium (DUO-NEB) 2.5 mg-0.5 mg/3 mL nebulizer solution; Take 3 mLs by nebulization every 6 (six) hours as needed for Wheezing. Rescue  Dispense: 75 mL; Refill: 0  - budesonide (PULMICORT) 0.25 mg/2 mL nebulizer solution; Take 2 mLs (0.25 mg total) by nebulization once daily. Controller  Dispense: 20 each; Refill: 3      Medication Monitoring: In today's visit, monitoring for drug toxicity was accomplished. Proper use of medications was discussed.     Counseling: Counseling included discussion regarding imaging findings, diagnosis, possibilities, treatment options, medications, risks, and benefits. She had many questions regarding the options and long-term effects. All questions were answered. She expressed understanding after counseling regarding the diagnosis and recommendations. She was capable and demonstrated competence with understanding of these options. Shared decision making was performed resulting in her choosing the current treatment plan.     She was counseled about the importance of healthy dietary habits as well as routine physical activity and exercise for better health outcomes. I also discussed the importance of cancer screening.     I also discussed the importance of close follow up to discuss  labs, change or modify her medications if needed, monitor side effects, and further evaluation of medical problems.     Additional workup planned: see labs ordered below.    See below for labs and meds ordered with associated diagnosis      Medication List with Changes/Refills   New Medications    ALBUTEROL-IPRATROPIUM (DUO-NEB) 2.5 MG-0.5 MG/3 ML NEBULIZER SOLUTION    Take 3 mLs by nebulization every 6 (six) hours as needed for Wheezing. Rescue    BUDESONIDE (PULMICORT) 0.25 MG/2 ML NEBULIZER SOLUTION    Take 2 mLs (0.25 mg total) by nebulization once daily. Controller    HYDROXYZINE HCL (ATARAX) 50 MG TABLET    Take 1 tablet (50 mg total) by mouth nightly as needed for Itching.    LACTULOSE (CHRONULAC) 20 GRAM/30 ML SOLN    Take 15 mLs (10 g total) by mouth once daily.    TIRZEPATIDE (MOUNJARO) 2.5 MG/0.5 ML PNIJ    Inject 2.5 mg into the skin every 7 days.   Current Medications    ALBUTEROL (PROVENTIL/VENTOLIN HFA) 90 MCG/ACTUATION INHALER    INHALE 2 PUFFS EVERY 6 HOURS AS NEEDED FOR WHEEZING (RESCUE)    AMOXICILLIN-CLAVULANATE 875-125MG (AUGMENTIN) 875-125 MG PER TABLET    Take 1 tablet by mouth 2 (two) times daily. for 5 days    ASCORBIC ACID, VITAMIN C, (VITAMIN C) 500 MG TABLET    Take 2 tablets (1,000 mg total) by mouth every evening.    ATORVASTATIN (LIPITOR) 10 MG TABLET    TAKE 1 TABLET(10 MG) BY MOUTH EVERY OTHER DAY    BLOOD SUGAR DIAGNOSTIC STRP    To check BG 2 times daily, to use with insurance preferred meter    BLOOD-GLUCOSE METER KIT    To check BG 2 times daily, to use with insurance preferred meter    ELIQUIS 5 MG TAB    TAKE 1 TABLET TWICE DAILY    EMPAGLIFLOZIN (JARDIANCE) 25 MG TABLET    Take 1 tablet (25 mg total) by mouth once daily.    FLUTICASONE PROPIONATE (FLONASE) 50 MCG/ACTUATION NASAL SPRAY    SHAKE LIQUID AND USE 1 SPRAY IN EACH NOSTRIL EVERY DAY    GABAPENTIN (NEURONTIN) 800 MG TABLET    TAKE 1 TABLET(800 MG) BY MOUTH THREE TIMES DAILY    LACTOBACILLUS ACIDOPHILUS 1 BILLION CELL  CAP    Take 1 capsule by mouth 3 (three) times daily with meals.    LANCETS MISC    To check BG 2 times daily, to use with insurance preferred meter    LIDOCAINE (LIDODERM) 5 %    APPLY PATCH ONTO THE SKIN. REMOVE AND DISCARD PATCH WITHIN 12 HOURS OR AS DIRECTED BY MD    MELATONIN 10 MG TAB    Take 10 mg by mouth nightly.    METFORMIN (GLUCOPHAGE) 500 MG TABLET    TAKE 1 TABLET EVERY DAY WITH BREAKFAST    MULTIVITAMIN (THERAGRAN) TABLET    Take 1 tablet by mouth once daily.    NYSTATIN (NYSTOP) POWDER    APPLY TOPICALLY TO THE AFFECTED AREA TWICE DAILY    NYSTATIN-TRIAMCINOLONE (MYCOLOG II) CREAM    Apply topically 3 (three) times daily. Apply to affected area    ONDANSETRON (ZOFRAN-ODT) 8 MG TBDL    Take 1 tablet (8 mg total) by mouth every 12 (twelve) hours as needed.    POTASSIUM CHLORIDE SA (K-DUR,KLOR-CON) 20 MEQ TABLET    Take 1 tablet (20 mEq total) by mouth once daily.    TRELEGY ELLIPTA 100-62.5-25 MCG DSDV    INHALE 1 PUFF EVERY DAY. EXPIRES 42 DAYS AFTER OPENING FOIL TRAY   Changed and/or Refilled Medications    Modified Medication Previous Medication    FUROSEMIDE (LASIX) 40 MG TABLET furosemide (LASIX) 40 MG tablet       Take 1 tablet (40 mg total) by mouth once daily.    Take 1 tablet (40 mg total) by mouth once daily.    HYDROCODONE-ACETAMINOPHEN (NORCO) 7.5-325 MG PER TABLET HYDROcodone-acetaminophen (NORCO) 7.5-325 mg per tablet       Take 1 tablet by mouth every 12 (twelve) hours as needed for Pain. Medical necessary for greater than 7 days for chronic pain.    Take 1 tablet by mouth every 12 (twelve) hours as needed for Pain. Medical necessary for greater than 7 days for chronic pain.    LISINOPRIL (PRINIVIL,ZESTRIL) 20 MG TABLET lisinopriL (PRINIVIL,ZESTRIL) 20 MG tablet       Take 1 tablet (20 mg total) by mouth once daily.        SPIRONOLACTONE (ALDACTONE) 25 MG TABLET spironolactone (ALDACTONE) 25 MG tablet       Take 1 tablet (25 mg total) by mouth 3 (three) times a week.    Take 1 tablet (25  mg total) by mouth once daily.   Discontinued Medications    FLUCONAZOLE (DIFLUCAN) 100 MG TABLET    Take 2 tablets (200 mg total) by mouth once a week.    METHYLPREDNISOLONE (MEDROL DOSEPACK) 4 MG TABLET    use as directed     Modified Medications    Modified Medication Previous Medication    FUROSEMIDE (LASIX) 40 MG TABLET furosemide (LASIX) 40 MG tablet       Take 1 tablet (40 mg total) by mouth once daily.    Take 1 tablet (40 mg total) by mouth once daily.    HYDROCODONE-ACETAMINOPHEN (NORCO) 7.5-325 MG PER TABLET HYDROcodone-acetaminophen (NORCO) 7.5-325 mg per tablet       Take 1 tablet by mouth every 12 (twelve) hours as needed for Pain. Medical necessary for greater than 7 days for chronic pain.    Take 1 tablet by mouth every 12 (twelve) hours as needed for Pain. Medical necessary for greater than 7 days for chronic pain.    LISINOPRIL (PRINIVIL,ZESTRIL) 20 MG TABLET lisinopriL (PRINIVIL,ZESTRIL) 20 MG tablet       Take 1 tablet (20 mg total) by mouth once daily.        SPIRONOLACTONE (ALDACTONE) 25 MG TABLET spironolactone (ALDACTONE) 25 MG tablet       Take 1 tablet (25 mg total) by mouth 3 (three) times a week.    Take 1 tablet (25 mg total) by mouth once daily.   X-Ray Chest PA And Lateral  Narrative: EXAMINATION:  XR CHEST PA AND LATERAL    CLINICAL HISTORY:  pneumonia; Acute respiratory failure with hypoxia    TECHNIQUE:  PA and lateral views of the chest were performed.    COMPARISON:  02/19/2024    FINDINGS:  There is moderate cardiomegaly which is unchanged in degree.  The pulmonary vessels do not appear congested.  There is elevation of the right diaphragm.  On the lateral view, there is focal consolidation of the right lung base suggestive of pneumonitis.  There may also be a small right pleural effusion.  No pneumothorax is identified.  Skeletal structures are intact.  Impression: 1. Cardiomegaly which is unchanged in degree.  2. Possible right lower lobe pneumonia and small right pleural  "effusion.    Electronically signed by: Lucas Miles MD  Date:    02/23/2024  Time:    13:11  Patient clinical examination has shown improvement her pneumonia.  We will recommend repeating chest x-ray within next 10 days 14 days.  She may need a CT of the chest..  Recommended incentive spirometry, also recommended continue breathing treatments at least twice a day for the next 2 weeks, patient declined vaccines she had COVID vaccine, RSV vaccination, and consider DTaP booster.,      Follow up in about 2 months (around 4/23/2024), or Labs by Home Health. for further workup and reassessment if labs and tests obtained are stable or sooner as needed. She was instructed to call the clinic or go to the emergency department if her symptoms do not improve, worsens, or if new symptoms develop. Patient knows to call any time if an emergency arises. Shared decision making occurred and she verbalized understanding in agreement with this plan.     Documentation entered by me for this encounter may have been done in part using speech-recognition technology. Although I have made an effort to ensure accuracy, "sound like" errors may exist and should be interpreted in context.       Constantine Bird MD     Discussed health maintenance guidelines appropriate for age.    "

## 2024-02-23 NOTE — PATIENT INSTRUCTIONS
Patient Education       Type 2 Diabetes Discharge Instructions   About this topic   Type 2 diabetes is the most common type of diabetes. If you have this illness, your body does not make enough insulin or does not correctly use the insulin it does make. When you eat, your body breaks down all sugars and starches into glucose. Your body needs insulin to use glucose for energy. The insulin takes the glucose from your blood into your cells. If you do not have enough insulin, or your body does not use the insulin well, the glucose or sugar stays in your blood instead of going into your cells. This causes your blood sugar levels to be too high. Over time, this can damage your heart, nerves, eyes, kidneys, and other organs.     What care is needed at home?   Learn how to take care of your diabetes.  Ask your doctor what you need to do when you go home. Make sure you ask questions if you do not understand what the doctor says. This way you will know what you need to do.  Based on what diabetes drugs you take, you might need to check your blood sugar regularly at home. But not everyone with type 2 diabetes needs to do this. If you need to, your doctor will teach you how to check your blood sugar. Ask what the goal numbers are for your blood sugar. Keep a list of your blood sugar levels. This will help you learn what causes high or low readings and help you manage your diabetes.     Take your diabetes drugs as directed. Ask what to do if you miss a dose of your diabetes drugs.  Learn when, what, and how much to eat.  Be active. Walk, garden, or do something active for 30 minutes or more on most days of the week. This will also help you control your weight. Ask your doctor for proper food and exercise programs to follow.  Check your feet often. Look for blisters or sores. Always wear socks and shoes. Never walk barefoot, especially outdoors. Take special care around the pool and at the beach, as these surfaces may be  extremely hot and burn your feet. Report any problems with your feet to your doctor.  Wear a medical ID.  Limit or avoid beer, wine, and mixed drinks (alcohol).  If you smoke, try to quit. Your doctor or nurse can help.  Talk with your doctor about if you need to check your blood sugar at home.  If you are overweight, ask your doctor if you would be a good candidate for bariatric surgery as this can reverse diabetes in some cases.  What follow-up care is needed?   Your doctor may ask you to make visits to the office to check on your progress. Be sure to keep these visits.  What drugs may be needed?   Diabetes drugs will help control your blood sugar. You may have more than one diabetes drug. Your doctor may order drugs for you to take by mouth or insulin as a shot. You will be trained on how to give insulin shots, if needed. Talk to your doctor about your diabetes drugs and what you need to do when you go home.  Will physical activity be limited?   Being active can lower blood sugar and blood pressure. It can also help control weight. Be sure to check with your doctor before starting any exercise program.  Try to walk, bike, or swim every day. Start with 5 to 10 minutes each day. Work up to about 30 minutes most days.  Drink lots of water during workouts.  What changes to diet are needed?   Eating a healthy diet is important. This means you need to eat regularly throughout the day. You need to include a variety of foods such as fruits and vegetables, whole grains, nonfat dairy products, and lean meats. Do not eat too much food at one time and do not skip meals. Limit foods high in sugar like sweets, desserts, and fruit juices. Ask your doctor about what kind of diet is right for you.  What problems could happen?   Heart, kidney, and nerve problems  Foot problems. Sores and infection may also happen.  Eye problems  Dangerously high blood sugar levels requiring emergency treatment  Severe infections  Talk with your  doctor often. Other drugs or care may be needed to treat or prevent these problems.  When do I need to call the doctor?   You have signs of high blood sugar like you:  Are more thirsty  Are urinating more often  Have new shortness of breath  Have belly pain, an upset stomach, or are throwing up.  Are more tired than normal  Have a very dry mouth or a fruity breath odor  Signs of infection. These include a fever of 100.4°F (38°C) or higher, chills, or a wound that will not heal.  Blurred vision  Chest pain  Swelling in your legs  Change in color and odor of your feet  Blood sugar that remains high and does not respond to treatment  Helpful tips   Talk to your doctor about getting a flu shot and pneumonia shot.  Teach Back: Helping You Understand   The Teach Back Method helps you understand the information we are giving you. After you talk with the staff, tell them in your own words what you learned. This helps to make sure the staff has described each thing clearly. It also helps to explain things that may have been confusing. Before going home, make sure you can do these:  I can tell you about my condition.  I can tell you what I need to do to control my blood sugar.  I can tell you what I will do if I have signs of high blood sugar.  Where can I learn more?   Center for Disease Control and Prevention  https://www.cdc.gov/diabetes/basics/type2.html   International Diabetes Foundation  https://www.idf.org/aboutdiabetes/type-2-diabetes.html   UpToDate  https://www.[employer].com/contents/type-2-diabetes-overview-beyond-the-basics   Last Reviewed Date   2021-05-04  Consumer Information Use and Disclaimer   This information is not specific medical advice and does not replace information you receive from your health care provider. This is only a brief summary of general information. It does NOT include all information about conditions, illnesses, injuries, tests, procedures, treatments, therapies, discharge instructions or  life-style choices that may apply to you. You must talk with your health care provider for complete information about your health and treatment options. This information should not be used to decide whether or not to accept your health care providers advice, instructions or recommendations. Only your health care provider has the knowledge and training to provide advice that is right for you.  Copyright   Copyright © 2021 [employer] and its affiliates and/or licensors. All rights reserved.

## 2024-02-24 LAB
BACTERIA BLD CULT: NORMAL
BACTERIA BLD CULT: NORMAL
OHS QRS DURATION: 112 MS
OHS QTC CALCULATION: 447 MS
S PNEUM AG UR QL: NOT DETECTED

## 2024-02-27 ENCOUNTER — PATIENT OUTREACH (OUTPATIENT)
Dept: ADMINISTRATIVE | Facility: CLINIC | Age: 74
End: 2024-02-27
Payer: MEDICARE

## 2024-02-27 DIAGNOSIS — J44.1 CHRONIC OBSTRUCTIVE PULMONARY DISEASE WITH ACUTE EXACERBATION: Primary | ICD-10-CM

## 2024-02-27 NOTE — PROGRESS NOTES
C3 nurse spoke with Nelly Tian for a TCC post hospital discharge follow up call. The patient had a HOSPFU on 2/23/24 with Constantine Bird MD

## 2024-03-04 DIAGNOSIS — N25.81 HYPERPARATHYROIDISM, SECONDARY RENAL: ICD-10-CM

## 2024-03-05 RX ORDER — IPRATROPIUM BROMIDE AND ALBUTEROL SULFATE 2.5; .5 MG/3ML; MG/3ML
SOLUTION RESPIRATORY (INHALATION)
Qty: 90 ML | OUTPATIENT
Start: 2024-03-05

## 2024-03-05 NOTE — TELEPHONE ENCOUNTER
No care due was identified.  [facility] Embedded Care Due Messages. Reference number: [id number].   3/05/2024 2:52:46 AM CST

## 2024-03-05 NOTE — TELEPHONE ENCOUNTER
Refill Decision Note   Nelly Tian  is requesting a refill authorization.  Brief Assessment and Rationale for Refill:  Quick Discontinue     Medication Therapy Plan:  Receipt confirmed by pharmacy (2/23/2024 11:59 AM CST)      Comments:     Note composed:2:54 AM 03/05/2024

## 2024-03-07 DIAGNOSIS — R11.11 NON-INTRACTABLE VOMITING WITHOUT NAUSEA: ICD-10-CM

## 2024-03-07 DIAGNOSIS — E11.43 GASTROPARESIS DIABETICORUM: ICD-10-CM

## 2024-03-07 DIAGNOSIS — K31.84 GASTROPARESIS DIABETICORUM: ICD-10-CM

## 2024-03-07 RX ORDER — ONDANSETRON 8 MG/1
8 TABLET, ORALLY DISINTEGRATING ORAL EVERY 12 HOURS PRN
Qty: 40 TABLET | Refills: 3 | Status: SHIPPED | OUTPATIENT
Start: 2024-03-07

## 2024-03-07 NOTE — TELEPHONE ENCOUNTER
No care due was identified.  [facility] Embedded Care Due Messages. Reference number: [id number].   3/07/2024 10:15:47 AM CST

## 2024-03-07 NOTE — TELEPHONE ENCOUNTER
Refill Routing Note   Medication(s) are not appropriate for processing by [facility] for the following reason(s):        Outside of protocol    [facility] action(s):  Route               Appointments  past 12m or future 3m with PCP    Date Provider   Last Visit   2/23/2024 Constantine Bird MD   Next Visit   Visit date not found Constantine Bird MD   ED visits in past 90 days: 0        Note composed:11:19 AM 03/07/2024

## 2024-03-12 ENCOUNTER — PATIENT OUTREACH (OUTPATIENT)
Dept: ADMINISTRATIVE | Facility: OTHER | Age: 74
End: 2024-03-12
Payer: MEDICARE

## 2024-03-18 NOTE — PROGRESS NOTES
LPN spoke to patient/caregiver as per [facility] referral for: PCA, transp    Does the patient consent to completing the assessment/enrollment: Yes  Does the patient consent for LPN to speak to a caregiver? No    Health Insurance Coverage:     Does the patient have adequate health insurance coverage? Yes  Education provided: Yes    PCP Follow-Up Appointments:    Does the patient have a primary care provider? yes - Constantine Bird MD  Date of last PCP appointment? 2/23/24  Next PCP appointment:  4/5/24  Was patient provided with education surrounding PCP services/creating a medical home? yes -       Specialist Appointments:     Does the patient have a pending specialist referral? no  Does the patient have an upcoming specialist appointment? no  Is the patient pregnant? No  Does the patient have a mental health provider? no       Home Medications:     Reviewed medication list with patient? No  Is the patient able to afford their medications? Yes      Recent lab results:  Blood Sugar:    Provided education: Yes  Blood Pressure:   Provided education: Yes        Social Determinants of Health (SDOH)    Patient's identified areas of need:      Education/Resources provided:          Home Health/DME:    Current Home Health: No  Patient has all healthcare equipment/supplies indicated: yes  Current DME:  oxygen and supplies, BP monitor, BS monitor.     Additional Documentation:   Spoke to patient based on [facility] referral. Interested in finding out about PCA and possible daughter getting compensated to be her PCA. Will investigate. Pt saw pcp in Feb and f/u in April. States transp is sometimes issue to get to appts. Will check with insurance and [place] [employer] for resources. Denies issues with food/utility/housing. Pt uses oxygen and supplies at home. States has BP and BS monitor but they are very old she feels readings may sometimes be inaccurate. Will check with insurance to see how often they will provide these monitors. Will f/u in  1 week.       Follow up:   Patient agrees to scheduled follow up call.

## 2024-03-27 ENCOUNTER — PATIENT OUTREACH (OUTPATIENT)
Dept: ADMINISTRATIVE | Facility: OTHER | Age: 74
End: 2024-03-27
Payer: MEDICARE

## 2024-03-27 ENCOUNTER — EXTERNAL HOME HEALTH (OUTPATIENT)
Dept: HOME HEALTH SERVICES | Facility: HOSPITAL | Age: 74
End: 2024-03-27
Payer: MEDICARE

## 2024-03-27 NOTE — PROGRESS NOTES
CHW - Follow Up    This Community Health Worker completed a follow up visit with patient via telephone today.  Pt/Caregiver reported:   Community Health Worker provided: Spoke to Opal at [facility], she had me on hold while she checked pt benefits for jail care. Call then dropped. She called me back, call dropped again. Called back and could not reach her. I will call [facility] back tomorrow. I updated pt and clarified that she has never had a PCA before. Will let her know what I find out tomorrow.   Follow up required:   No future outreach task assigned

## 2024-03-28 ENCOUNTER — PATIENT MESSAGE (OUTPATIENT)
Dept: FAMILY MEDICINE | Facility: CLINIC | Age: 74
End: 2024-03-28
Payer: MEDICARE

## 2024-03-28 ENCOUNTER — PATIENT OUTREACH (OUTPATIENT)
Dept: ADMINISTRATIVE | Facility: OTHER | Age: 74
End: 2024-03-28
Payer: MEDICARE

## 2024-03-28 NOTE — PROGRESS NOTES
CHW - Case Closure    This LPN spoke to patient via telephone today.   Pt/Caregiver reported: Spoke to [facility] which states her plan excludes personal home care. Discussed medicaid waiver, pt does not qualify because she has medicaid secondary only. Ref # for call to [facility]: [id number]. Denies other needs, episode closed.   Pt/Caregiver denied any additional needs at this time and agrees with episode closure at this time.  Provided patient with Community Health Worker's contact information and encouraged him/her to contact this Community Health Worker if additional needs arise.

## 2024-03-29 DIAGNOSIS — Z51.81 THERAPEUTIC DRUG MONITORING: ICD-10-CM

## 2024-03-29 DIAGNOSIS — M51.36 LUMBAR DEGENERATIVE DISC DISEASE: ICD-10-CM

## 2024-04-05 RX ORDER — HYDROCODONE BITARTRATE AND ACETAMINOPHEN 7.5; 325 MG/1; MG/1
1 TABLET ORAL EVERY 12 HOURS PRN
Qty: 60 TABLET | Refills: 0 | Status: SHIPPED | OUTPATIENT
Start: 2024-04-05 | End: 2024-05-02 | Stop reason: SDUPTHER

## 2024-04-12 ENCOUNTER — PATIENT MESSAGE (OUTPATIENT)
Dept: ADMINISTRATIVE | Facility: HOSPITAL | Age: 74
End: 2024-04-12
Payer: MEDICARE

## 2024-04-18 DIAGNOSIS — N25.81 HYPERPARATHYROIDISM, SECONDARY RENAL: ICD-10-CM

## 2024-04-18 NOTE — TELEPHONE ENCOUNTER
Care Due:                  Date            Visit Type   Department     Provider  --------------------------------------------------------------------------------                                HOSPITAL  Last Visit: 02-      FOLLOW UP    None Found     Constantine Bird  Next Visit: None Scheduled  None         None Found                                                            Last  Test          Frequency    Reason                     Performed    Due Date  --------------------------------------------------------------------------------    Lipid Panel.  12 months..  atorvastatin.............  03- 03-    [facility] Embedded Care Due Messages. Reference number: [id number].   4/18/2024 5:35:38 PM CDT

## 2024-04-18 NOTE — TELEPHONE ENCOUNTER
Refill Routing Note   Medication(s) are not appropriate for processing by [facility] for the following reason(s):        New or recently adjusted medication    [facility] action(s):  Defer   Requires labs : Yes             Appointments  past 12m or future 3m with PCP    Date Provider   Last Visit   2/23/2024 Constantine Bird MD   Next Visit   Visit date not found Constantine Bird MD   ED visits in past 90 days: 0        Note composed:5:38 PM 04/18/2024

## 2024-04-21 ENCOUNTER — HOSPITAL ENCOUNTER (INPATIENT)
Facility: HOSPITAL | Age: 74
LOS: 3 days | Discharge: HOME HEALTH | DRG: 872 | End: 2024-04-24
Attending: STUDENT IN AN ORGANIZED HEALTH CARE EDUCATION/TRAINING PROGRAM | Admitting: INTERNAL MEDICINE
Payer: MEDICARE

## 2024-04-21 DIAGNOSIS — I48.91 ATRIAL FIBRILLATION, UNSPECIFIED TYPE: ICD-10-CM

## 2024-04-21 DIAGNOSIS — L03.311 ABDOMINAL WALL CELLULITIS: Primary | ICD-10-CM

## 2024-04-21 DIAGNOSIS — R65.20 SEVERE SEPSIS: ICD-10-CM

## 2024-04-21 DIAGNOSIS — I49.9 DYSRHYTHMIA: ICD-10-CM

## 2024-04-21 DIAGNOSIS — A41.9 SEVERE SEPSIS: ICD-10-CM

## 2024-04-21 DIAGNOSIS — A41.9 SEPSIS: ICD-10-CM

## 2024-04-21 PROBLEM — L03.90 CELLULITIS: Status: RESOLVED | Noted: 2018-08-22 | Resolved: 2024-04-21

## 2024-04-21 PROBLEM — N30.90 CYSTITIS: Status: ACTIVE | Noted: 2024-04-21

## 2024-04-21 LAB
ALBUMIN SERPL BCP-MCNC: 3.8 G/DL (ref 3.5–5.2)
ALLENS TEST: ABNORMAL
ALP SERPL-CCNC: 100 U/L (ref 55–135)
ALT SERPL W/O P-5'-P-CCNC: 19 U/L (ref 10–44)
ANION GAP SERPL CALC-SCNC: 16 MMOL/L (ref 8–16)
APTT PPP: 23.7 SEC (ref 21–32)
AST SERPL-CCNC: 26 U/L (ref 10–40)
BACTERIA #/AREA URNS HPF: ABNORMAL /HPF
BASOPHILS # BLD AUTO: 0.04 K/UL (ref 0–0.2)
BASOPHILS NFR BLD: 0.3 % (ref 0–1.9)
BILIRUB SERPL-MCNC: 0.4 MG/DL (ref 0.1–1)
BILIRUB UR QL STRIP: NEGATIVE
BNP SERPL-MCNC: 224 PG/ML (ref 0–99)
BUN SERPL-MCNC: 14 MG/DL (ref 8–23)
CALCIUM SERPL-MCNC: 10.4 MG/DL (ref 8.7–10.5)
CHLORIDE SERPL-SCNC: 96 MMOL/L (ref 95–110)
CLARITY UR: CLEAR
CO2 SERPL-SCNC: 24 MMOL/L (ref 23–29)
COLOR UR: YELLOW
CREAT SERPL-MCNC: 0.7 MG/DL (ref 0.5–1.4)
DELSYS: ABNORMAL
DIFFERENTIAL METHOD BLD: ABNORMAL
EOSINOPHIL # BLD AUTO: 0.4 K/UL (ref 0–0.5)
EOSINOPHIL NFR BLD: 3.1 % (ref 0–8)
ERYTHROCYTE [DISTWIDTH] IN BLOOD BY AUTOMATED COUNT: 14 % (ref 11.5–14.5)
EST. GFR  (NO RACE VARIABLE): >60 ML/MIN/1.73 M^2
GLUCOSE SERPL-MCNC: 119 MG/DL (ref 70–110)
GLUCOSE UR QL STRIP: ABNORMAL
HCO3 UR-SCNC: 28 MMOL/L (ref 24–28)
HCT VFR BLD AUTO: 48.4 % (ref 37–48.5)
HGB BLD-MCNC: 15.1 G/DL (ref 12–16)
HGB UR QL STRIP: NEGATIVE
IMM GRANULOCYTES # BLD AUTO: 0.06 K/UL (ref 0–0.04)
IMM GRANULOCYTES NFR BLD AUTO: 0.4 % (ref 0–0.5)
INFLUENZA A, MOLECULAR: NEGATIVE
INFLUENZA B, MOLECULAR: NEGATIVE
INR PPP: 1 (ref 0.8–1.2)
KETONES UR QL STRIP: NEGATIVE
LACTATE SERPL-SCNC: 2 MMOL/L (ref 0.5–2.2)
LDH SERPL L TO P-CCNC: 1.91 MMOL/L (ref 0.36–1.25)
LEUKOCYTE ESTERASE UR QL STRIP: ABNORMAL
LIPASE SERPL-CCNC: 32 U/L (ref 4–60)
LYMPHOCYTES # BLD AUTO: 1.3 K/UL (ref 1–4.8)
LYMPHOCYTES NFR BLD: 9.6 % (ref 18–48)
MAGNESIUM SERPL-MCNC: 1.8 MG/DL (ref 1.6–2.6)
MCH RBC QN AUTO: 30.9 PG (ref 27–31)
MCHC RBC AUTO-ENTMCNC: 31.2 G/DL (ref 32–36)
MCV RBC AUTO: 99 FL (ref 82–98)
MICROSCOPIC COMMENT: ABNORMAL
MONOCYTES # BLD AUTO: 1.2 K/UL (ref 0.3–1)
MONOCYTES NFR BLD: 8.5 % (ref 4–15)
NEUTROPHILS # BLD AUTO: 10.9 K/UL (ref 1.8–7.7)
NEUTROPHILS NFR BLD: 78.1 % (ref 38–73)
NITRITE UR QL STRIP: NEGATIVE
NRBC BLD-RTO: 0 /100 WBC
PCO2 BLDA: 37.2 MMHG (ref 35–45)
PH SMN: 7.48 [PH] (ref 7.35–7.45)
PH UR STRIP: 7 [PH] (ref 5–8)
PLATELET # BLD AUTO: 210 K/UL (ref 150–450)
PMV BLD AUTO: 9.9 FL (ref 9.2–12.9)
PO2 BLDA: 68 MMHG (ref 80–100)
POC BE: 5 MMOL/L
POC SATURATED O2: 95 % (ref 95–100)
POC TCO2: 29 MMOL/L (ref 23–27)
POCT GLUCOSE: 105 MG/DL (ref 70–110)
POCT GLUCOSE: 65 MG/DL (ref 70–110)
POCT GLUCOSE: 81 MG/DL (ref 70–110)
POTASSIUM SERPL-SCNC: 4.9 MMOL/L (ref 3.5–5.1)
PROCALCITONIN SERPL IA-MCNC: 0.04 NG/ML (ref 0–0.5)
PROT SERPL-MCNC: 7.7 G/DL (ref 6–8.4)
PROT UR QL STRIP: NEGATIVE
PROTHROMBIN TIME: 11.1 SEC (ref 9–12.5)
RBC # BLD AUTO: 4.89 M/UL (ref 4–5.4)
RBC #/AREA URNS HPF: 2 /HPF (ref 0–4)
SAMPLE: ABNORMAL
SARS-COV-2 RDRP RESP QL NAA+PROBE: NEGATIVE
SITE: ABNORMAL
SODIUM SERPL-SCNC: 136 MMOL/L (ref 136–145)
SP GR UR STRIP: 1.01 (ref 1–1.03)
SPECIMEN SOURCE: NORMAL
SQUAMOUS #/AREA URNS HPF: 2 /HPF
TROPONIN I SERPL DL<=0.01 NG/ML-MCNC: 0.02 NG/ML (ref 0–0.03)
URN SPEC COLLECT METH UR: ABNORMAL
UROBILINOGEN UR STRIP-ACNC: NEGATIVE EU/DL
WBC # BLD AUTO: 13.92 K/UL (ref 3.9–12.7)
WBC #/AREA URNS HPF: 6 /HPF (ref 0–5)
YEAST URNS QL MICRO: ABNORMAL

## 2024-04-21 PROCEDURE — 99285 EMERGENCY DEPT VISIT HI MDM: CPT | Mod: 25

## 2024-04-21 PROCEDURE — 25000242 PHARM REV CODE 250 ALT 637 W/ HCPCS

## 2024-04-21 PROCEDURE — 85610 PROTHROMBIN TIME: CPT | Performed by: STUDENT IN AN ORGANIZED HEALTH CARE EDUCATION/TRAINING PROGRAM

## 2024-04-21 PROCEDURE — 82803 BLOOD GASES ANY COMBINATION: CPT

## 2024-04-21 PROCEDURE — 25000003 PHARM REV CODE 250

## 2024-04-21 PROCEDURE — 94640 AIRWAY INHALATION TREATMENT: CPT

## 2024-04-21 PROCEDURE — 36600 WITHDRAWAL OF ARTERIAL BLOOD: CPT

## 2024-04-21 PROCEDURE — 63600175 PHARM REV CODE 636 W HCPCS: Performed by: STUDENT IN AN ORGANIZED HEALTH CARE EDUCATION/TRAINING PROGRAM

## 2024-04-21 PROCEDURE — 25000003 PHARM REV CODE 250: Performed by: STUDENT IN AN ORGANIZED HEALTH CARE EDUCATION/TRAINING PROGRAM

## 2024-04-21 PROCEDURE — 85730 THROMBOPLASTIN TIME PARTIAL: CPT | Performed by: STUDENT IN AN ORGANIZED HEALTH CARE EDUCATION/TRAINING PROGRAM

## 2024-04-21 PROCEDURE — 63600175 PHARM REV CODE 636 W HCPCS

## 2024-04-21 PROCEDURE — 83605 ASSAY OF LACTIC ACID: CPT

## 2024-04-21 PROCEDURE — 83735 ASSAY OF MAGNESIUM: CPT | Performed by: STUDENT IN AN ORGANIZED HEALTH CARE EDUCATION/TRAINING PROGRAM

## 2024-04-21 PROCEDURE — 94761 N-INVAS EAR/PLS OXIMETRY MLT: CPT | Mod: XB

## 2024-04-21 PROCEDURE — 96365 THER/PROPH/DIAG IV INF INIT: CPT

## 2024-04-21 PROCEDURE — 87040 BLOOD CULTURE FOR BACTERIA: CPT | Mod: 59 | Performed by: STUDENT IN AN ORGANIZED HEALTH CARE EDUCATION/TRAINING PROGRAM

## 2024-04-21 PROCEDURE — 96367 TX/PROPH/DG ADDL SEQ IV INF: CPT

## 2024-04-21 PROCEDURE — 93010 ELECTROCARDIOGRAM REPORT: CPT | Mod: ,,, | Performed by: GENERAL PRACTICE

## 2024-04-21 PROCEDURE — 83605 ASSAY OF LACTIC ACID: CPT | Performed by: STUDENT IN AN ORGANIZED HEALTH CARE EDUCATION/TRAINING PROGRAM

## 2024-04-21 PROCEDURE — 11000001 HC ACUTE MED/SURG PRIVATE ROOM

## 2024-04-21 PROCEDURE — 99900035 HC TECH TIME PER 15 MIN (STAT)

## 2024-04-21 PROCEDURE — U0002 COVID-19 LAB TEST NON-CDC: HCPCS | Performed by: STUDENT IN AN ORGANIZED HEALTH CARE EDUCATION/TRAINING PROGRAM

## 2024-04-21 PROCEDURE — 27000221 HC OXYGEN, UP TO 24 HOURS

## 2024-04-21 PROCEDURE — 85025 COMPLETE CBC W/AUTO DIFF WBC: CPT | Performed by: STUDENT IN AN ORGANIZED HEALTH CARE EDUCATION/TRAINING PROGRAM

## 2024-04-21 PROCEDURE — 81000 URINALYSIS NONAUTO W/SCOPE: CPT | Performed by: STUDENT IN AN ORGANIZED HEALTH CARE EDUCATION/TRAINING PROGRAM

## 2024-04-21 PROCEDURE — 83880 ASSAY OF NATRIURETIC PEPTIDE: CPT | Performed by: STUDENT IN AN ORGANIZED HEALTH CARE EDUCATION/TRAINING PROGRAM

## 2024-04-21 PROCEDURE — 80053 COMPREHEN METABOLIC PANEL: CPT | Performed by: STUDENT IN AN ORGANIZED HEALTH CARE EDUCATION/TRAINING PROGRAM

## 2024-04-21 PROCEDURE — 83690 ASSAY OF LIPASE: CPT | Performed by: STUDENT IN AN ORGANIZED HEALTH CARE EDUCATION/TRAINING PROGRAM

## 2024-04-21 PROCEDURE — 84145 PROCALCITONIN (PCT): CPT | Performed by: STUDENT IN AN ORGANIZED HEALTH CARE EDUCATION/TRAINING PROGRAM

## 2024-04-21 PROCEDURE — 36415 COLL VENOUS BLD VENIPUNCTURE: CPT | Performed by: STUDENT IN AN ORGANIZED HEALTH CARE EDUCATION/TRAINING PROGRAM

## 2024-04-21 PROCEDURE — 82962 GLUCOSE BLOOD TEST: CPT

## 2024-04-21 PROCEDURE — 84484 ASSAY OF TROPONIN QUANT: CPT | Performed by: STUDENT IN AN ORGANIZED HEALTH CARE EDUCATION/TRAINING PROGRAM

## 2024-04-21 PROCEDURE — 87502 INFLUENZA DNA AMP PROBE: CPT | Performed by: STUDENT IN AN ORGANIZED HEALTH CARE EDUCATION/TRAINING PROGRAM

## 2024-04-21 PROCEDURE — 93005 ELECTROCARDIOGRAM TRACING: CPT

## 2024-04-21 RX ORDER — INSULIN ASPART 100 [IU]/ML
0-5 INJECTION, SOLUTION INTRAVENOUS; SUBCUTANEOUS
Status: DISCONTINUED | OUTPATIENT
Start: 2024-04-21 | End: 2024-04-24 | Stop reason: HOSPADM

## 2024-04-21 RX ORDER — GLUCAGON 1 MG
1 KIT INJECTION
Status: DISCONTINUED | OUTPATIENT
Start: 2024-04-21 | End: 2024-04-24 | Stop reason: HOSPADM

## 2024-04-21 RX ORDER — MORPHINE SULFATE 2 MG/ML
2 INJECTION, SOLUTION INTRAMUSCULAR; INTRAVENOUS EVERY 4 HOURS PRN
Status: DISCONTINUED | OUTPATIENT
Start: 2024-04-21 | End: 2024-04-24 | Stop reason: HOSPADM

## 2024-04-21 RX ORDER — ATORVASTATIN CALCIUM 10 MG/1
10 TABLET, FILM COATED ORAL DAILY
Status: DISCONTINUED | OUTPATIENT
Start: 2024-04-21 | End: 2024-04-24

## 2024-04-21 RX ORDER — SPIRONOLACTONE 25 MG/1
25 TABLET ORAL
Status: DISCONTINUED | OUTPATIENT
Start: 2024-04-22 | End: 2024-04-24 | Stop reason: HOSPADM

## 2024-04-21 RX ORDER — LANOLIN ALCOHOL/MO/W.PET/CERES
800 CREAM (GRAM) TOPICAL
Status: DISCONTINUED | OUTPATIENT
Start: 2024-04-21 | End: 2024-04-24 | Stop reason: HOSPADM

## 2024-04-21 RX ORDER — ONDANSETRON HYDROCHLORIDE 2 MG/ML
4 INJECTION, SOLUTION INTRAVENOUS EVERY 6 HOURS PRN
Status: DISCONTINUED | OUTPATIENT
Start: 2024-04-21 | End: 2024-04-24 | Stop reason: HOSPADM

## 2024-04-21 RX ORDER — IBUPROFEN 200 MG
16 TABLET ORAL
Status: DISCONTINUED | OUTPATIENT
Start: 2024-04-21 | End: 2024-04-24 | Stop reason: HOSPADM

## 2024-04-21 RX ORDER — NALOXONE HCL 0.4 MG/ML
0.02 VIAL (ML) INJECTION
Status: DISCONTINUED | OUTPATIENT
Start: 2024-04-21 | End: 2024-04-24 | Stop reason: HOSPADM

## 2024-04-21 RX ORDER — AMOXICILLIN 250 MG
1 CAPSULE ORAL 2 TIMES DAILY PRN
Status: DISCONTINUED | OUTPATIENT
Start: 2024-04-21 | End: 2024-04-24 | Stop reason: HOSPADM

## 2024-04-21 RX ORDER — IPRATROPIUM BROMIDE AND ALBUTEROL SULFATE 2.5; .5 MG/3ML; MG/3ML
3 SOLUTION RESPIRATORY (INHALATION)
Status: DISCONTINUED | OUTPATIENT
Start: 2024-04-21 | End: 2024-04-24 | Stop reason: HOSPADM

## 2024-04-21 RX ORDER — SODIUM CHLORIDE 0.9 % (FLUSH) 0.9 %
10 SYRINGE (ML) INJECTION EVERY 12 HOURS PRN
Status: DISCONTINUED | OUTPATIENT
Start: 2024-04-21 | End: 2024-04-24 | Stop reason: HOSPADM

## 2024-04-21 RX ORDER — LISINOPRIL 10 MG/1
20 TABLET ORAL DAILY
Status: DISCONTINUED | OUTPATIENT
Start: 2024-04-21 | End: 2024-04-24 | Stop reason: HOSPADM

## 2024-04-21 RX ORDER — ACETAMINOPHEN 325 MG/1
650 TABLET ORAL EVERY 4 HOURS PRN
Status: DISCONTINUED | OUTPATIENT
Start: 2024-04-21 | End: 2024-04-24 | Stop reason: HOSPADM

## 2024-04-21 RX ORDER — BUDESONIDE 0.25 MG/2ML
0.25 INHALANT ORAL DAILY
Status: DISCONTINUED | OUTPATIENT
Start: 2024-04-21 | End: 2024-04-24 | Stop reason: HOSPADM

## 2024-04-21 RX ORDER — ALUMINUM HYDROXIDE, MAGNESIUM HYDROXIDE, AND SIMETHICONE 1200; 120; 1200 MG/30ML; MG/30ML; MG/30ML
30 SUSPENSION ORAL 4 TIMES DAILY PRN
Status: DISCONTINUED | OUTPATIENT
Start: 2024-04-21 | End: 2024-04-24 | Stop reason: HOSPADM

## 2024-04-21 RX ORDER — FUROSEMIDE 40 MG/1
40 TABLET ORAL DAILY
Status: DISCONTINUED | OUTPATIENT
Start: 2024-04-21 | End: 2024-04-24 | Stop reason: HOSPADM

## 2024-04-21 RX ORDER — IBUPROFEN 200 MG
24 TABLET ORAL
Status: DISCONTINUED | OUTPATIENT
Start: 2024-04-21 | End: 2024-04-24 | Stop reason: HOSPADM

## 2024-04-21 RX ORDER — TALC
9 POWDER (GRAM) TOPICAL NIGHTLY PRN
Status: DISCONTINUED | OUTPATIENT
Start: 2024-04-21 | End: 2024-04-24 | Stop reason: HOSPADM

## 2024-04-21 RX ORDER — GABAPENTIN 100 MG/1
200 CAPSULE ORAL 3 TIMES DAILY
Status: DISCONTINUED | OUTPATIENT
Start: 2024-04-21 | End: 2024-04-24 | Stop reason: HOSPADM

## 2024-04-21 RX ADMIN — GABAPENTIN 200 MG: 100 CAPSULE ORAL at 08:04

## 2024-04-21 RX ADMIN — APIXABAN 5 MG: 2.5 TABLET, FILM COATED ORAL at 08:04

## 2024-04-21 RX ADMIN — LISINOPRIL 20 MG: 10 TABLET ORAL at 10:04

## 2024-04-21 RX ADMIN — PIPERACILLIN AND TAZOBACTAM 4.5 G: 4; .5 INJECTION, POWDER, LYOPHILIZED, FOR SOLUTION INTRAVENOUS; PARENTERAL at 08:04

## 2024-04-21 RX ADMIN — IPRATROPIUM BROMIDE AND ALBUTEROL SULFATE 3 ML: 2.5; .5 SOLUTION RESPIRATORY (INHALATION) at 06:04

## 2024-04-21 RX ADMIN — PIPERACILLIN AND TAZOBACTAM 4.5 G: 4; .5 INJECTION, POWDER, LYOPHILIZED, FOR SOLUTION INTRAVENOUS; PARENTERAL at 11:04

## 2024-04-21 RX ADMIN — APIXABAN 5 MG: 2.5 TABLET, FILM COATED ORAL at 10:04

## 2024-04-21 RX ADMIN — GABAPENTIN 200 MG: 100 CAPSULE ORAL at 02:04

## 2024-04-21 RX ADMIN — PIPERACILLIN AND TAZOBACTAM 4.5 G: 4; .5 INJECTION, POWDER, LYOPHILIZED, FOR SOLUTION INTRAVENOUS; PARENTERAL at 03:04

## 2024-04-21 RX ADMIN — ACETAMINOPHEN 650 MG: 325 TABLET ORAL at 04:04

## 2024-04-21 RX ADMIN — ATORVASTATIN CALCIUM 10 MG: 10 TABLET, FILM COATED ORAL at 10:04

## 2024-04-21 RX ADMIN — ACETAMINOPHEN 650 MG: 325 TABLET ORAL at 09:04

## 2024-04-21 RX ADMIN — BUDESONIDE 0.25 MG: 0.25 SUSPENSION RESPIRATORY (INHALATION) at 07:04

## 2024-04-21 RX ADMIN — GABAPENTIN 200 MG: 100 CAPSULE ORAL at 10:04

## 2024-04-21 RX ADMIN — CEFTRIAXONE SODIUM 1 G: 1 INJECTION, POWDER, FOR SOLUTION INTRAMUSCULAR; INTRAVENOUS at 02:04

## 2024-04-21 RX ADMIN — IPRATROPIUM BROMIDE AND ALBUTEROL SULFATE 3 ML: 2.5; .5 SOLUTION RESPIRATORY (INHALATION) at 01:04

## 2024-04-21 RX ADMIN — IPRATROPIUM BROMIDE AND ALBUTEROL SULFATE 3 ML: 2.5; .5 SOLUTION RESPIRATORY (INHALATION) at 08:04

## 2024-04-21 RX ADMIN — MELATONIN TAB 3 MG 9 MG: 3 TAB at 08:04

## 2024-04-21 RX ADMIN — FUROSEMIDE 40 MG: 40 TABLET ORAL at 10:04

## 2024-04-21 NOTE — PT/OT/SLP PROGRESS
Occupational Therapy      Patient Name:  Nelly Tian   MRN:  [id number]    Patient not seen today secondary to patient declined OT due to fatigue. Nurse notified. Will follow-up 4/22/2024.    4/21/2024

## 2024-04-21 NOTE — H&P
[facility] Medicine  History & Physical    Patient Name: Nelly Tian  MRN: [id number]  Patient Class: IP- Inpatient  Admission Date: 4/21/2024  Attending Physician: Geeta Wheat MD   Primary Care Provider: Constantine Bird MD         Patient information was obtained from patient, past medical records, and ER records.     Subjective:     Principal Problem:Severe sepsis    Chief Complaint:   Chief Complaint   Patient presents with    Wound Infection     sore on left lower abdomen with foul drainage        HPI: Nelly Tian is a 73 year old female with past medical history of AFib/a flutter on Eliquis, CHF, COPD, diabetes mellitus, recurrent abdominal cellulitis, hypertension, iron-deficiency anemia, hyperlipidemia, MI, anxiety, and arthritis who presents with complaints of left lower abdominal wound with orange/yellowish drainage for over a week. She reports developing the wound after scratching her lower abdomen and her daughter has been performing the wound care with dressing changes. She denies chest pain, shortness of breath, fever, chills, dizziness, lightheadedness, abdominal pain, nausea or vomiting. She presents septic in ED with a T-max 100.1, tachypneic, tachycardic, WBC 13.9, lactic acid 1.91, and urinalysis positive WBCs/leukocytes/bacteria culture pending.  Chest x-ray significant for trace pulmonary vascular congestion. She was started on Rocephin and Zosyn.  Wound care consult placed.  Admitted to hospital medicine for further management and treatment.         Past Medical History:   Diagnosis Date    *Atrial flutter     Angina pectoris 9/18/2017    Anxiety     Arthritis     Asthma     Atrial fibrillation     Back pain     Cataract     OD    Chest pain 9/17/2017    CHF (congestive heart failure)     Chronically on benzodiazepine therapy 5/4/2019    COPD (chronic obstructive pulmonary disease)     Depression     Diabetes mellitus     Emphysema of lung      Heart failure     Hepatomegaly 2/3/2016    Hernia     History of MI (myocardial infarction) 2016    Hypercapnic respiratory failure, chronic 2016    Hyperlipidemia     Hypertension     Iron deficiency anemia 2/3/2016    Myocardial infarction     Obesity     Peripheral vascular disease     Pneumonia     Polyneuropathy     Retinal detachment     OS    Septic shock 2017    Skin ulcer 3/18/2017    Tobacco dependence     Type II 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, not stated as uncontrolled(250.60)        Past Surgical History:   Procedure Laterality Date    BREAST BIOPSY Left 10 yrs ago    benign    CATARACT EXTRACTION Left     OS     CATARACT EXTRACTION W/  INTRAOCULAR LENS IMPLANT Right 2023    Procedure: CEIOL OD;  Surgeon: David Bautista MD;  Location: [facility] OR;  Service: Ophthalmology;  Laterality: Right;     SECTION      x2    EYE SURGERY      HYSTERECTOMY      partial    OOPHORECTOMY      one ovary conserved    RETINAL DETACHMENT SURGERY      buckle --OS    TONSILLECTOMY         Review of patient's allergies indicates:   Allergen Reactions    Dilaudid [hydromorphone] Anaphylaxis     Other reaction(s): Anaphylaxis  Other reaction(s): Unknown    Zyvox [linezolid in dextrose 5%] Shortness Of Breath       Current Facility-Administered Medications   Medication Dose Route Frequency Provider Last Rate Last Admin    acetaminophen tablet 650 mg  650 mg Oral Q4H PRN Evelyn Townsend DNP        albuterol-ipratropium 2.5 mg-0.5 mg/3 mL nebulizer solution 3 mL  3 mL Nebulization Q6H WAKE Evelyn Townsend DNP        aluminum-magnesium hydroxide-simethicone 200-200-20 mg/5 mL suspension 30 mL  30 mL Oral QID PRN Evelyn Townsend DNP        apixaban tablet 5 mg  5 mg Oral BID Evelyn Townsend DNP        atorvastatin tablet 10 mg  10 mg Oral Daily Evelyn Townsend DNP        budesonide nebulizer solution 0.25 mg  0.25 mg Nebulization Daily Evelyn Townsend DNP         dextrose 10% bolus 125 mL 125 mL  12.5 g Intravenous PRN Evelyn Townsend DNP        dextrose 10% bolus 250 mL 250 mL  25 g Intravenous PRN Evelyn Townsend DNP        furosemide tablet 40 mg  40 mg Oral Daily Evelyn Townsend DNP        gabapentin capsule 200 mg  200 mg Oral TID Evelyn Townsend DNP        glucagon (human recombinant) injection 1 mg  1 mg Intramuscular PRN Evelyn Townsend DNP        glucose chewable tablet 16 g  16 g Oral PRN Evelyn Townsend DNP        glucose chewable tablet 24 g  24 g Oral PRN Evelyn Townsend DNP        insulin aspart U-100 pen 0-5 Units  0-5 Units Subcutaneous QID (AC + HS) PRN Evelyn Townsend DNP        lisinopriL tablet 20 mg  20 mg Oral Daily Evelyn Townsend DNP        magnesium oxide tablet 800 mg  800 mg Oral PRN Evelyn Townsend DNP        magnesium oxide tablet 800 mg  800 mg Oral PRN Evelyn Townsend DNP        melatonin tablet 9 mg  9 mg Oral Nightly PRN Evelyn Townsend DNP        morphine injection 2 mg  2 mg Intravenous Q4H PRN Evelyn Townsend DNP        naloxone 0.4 mg/mL injection 0.02 mg  0.02 mg Intravenous PRN Evelyn Townsend DNP        ondansetron injection 4 mg  4 mg Intravenous Q6H PRN Evelyn Townsend DNP        ondansetron injection 4 mg  4 mg Intravenous Q6H PRN Evelyn Townsend DNP        potassium bicarbonate disintegrating tablet 35 mEq  35 mEq Oral PRN Evelyn Townsend DNP        potassium bicarbonate disintegrating tablet 50 mEq  50 mEq Oral PRN Evelyn Townsend DNP        potassium bicarbonate disintegrating tablet 60 mEq  60 mEq Oral PRN Evelyn Townsend DNP        senna-docusate 8.6-50 mg per tablet 1 tablet  1 tablet Oral BID PRN Evelyn Townsend DNP        sodium chloride 0.9% flush 10 mL  10 mL Intravenous Q12H PRN Evelyn Townsend DNP        [START ON 4/22/2024] spironolactone tablet 25 mg  25 mg Oral Once per day on Monday Wednesday Friday Evelyn Townsend DNP         Current Outpatient Medications   Medication Sig Dispense Refill     albuterol (PROVENTIL/VENTOLIN HFA) 90 mcg/actuation inhaler INHALE 2 PUFFS EVERY 6 HOURS AS NEEDED FOR WHEEZING (RESCUE) 18 g 5    albuterol-ipratropium (DUO-NEB) 2.5 mg-0.5 mg/3 mL nebulizer solution Take 3 mLs by nebulization every 6 (six) hours as needed for Wheezing. Rescue 75 mL 0    ascorbic acid, vitamin C, (VITAMIN C) 500 MG tablet Take 2 tablets (1,000 mg total) by mouth every evening.      atorvastatin (LIPITOR) 10 MG tablet TAKE 1 TABLET(10 MG) BY MOUTH EVERY OTHER DAY 35 tablet 3    blood sugar diagnostic Strp To check BG 2 times daily, to use with insurance preferred meter 100 each 5    blood-glucose meter kit To check BG 2 times daily, to use with insurance preferred meter 1 each 1    budesonide (PULMICORT) 0.25 mg/2 mL nebulizer solution Take 2 mLs (0.25 mg total) by nebulization once daily. Controller 20 each 3    ELIQUIS 5 mg Tab TAKE 1 TABLET TWICE DAILY 180 tablet 3    empagliflozin (JARDIANCE) 25 mg tablet Take 1 tablet (25 mg total) by mouth once daily. 90 tablet 4    fluticasone propionate (FLONASE) 50 mcg/actuation nasal spray SHAKE LIQUID AND USE 1 SPRAY IN EACH NOSTRIL EVERY DAY 48 g 3    furosemide (LASIX) 40 MG tablet Take 1 tablet (40 mg total) by mouth once daily. 90 tablet 9    gabapentin (NEURONTIN) 800 MG tablet TAKE 1 TABLET(800 MG) BY MOUTH THREE TIMES DAILY 90 tablet 11    HYDROcodone-acetaminophen (NORCO) 7.5-325 mg per tablet Take 1 tablet by mouth every 12 (twelve) hours as needed for Pain. Medical necessary for greater than 7 days for chronic pain. 60 tablet 0    hydrOXYzine HCL (ATARAX) 50 MG tablet Take 1 tablet (50 mg total) by mouth nightly as needed for Itching. (Patient not taking: Reported on 2/27/2024) 90 tablet 2    Lactobacillus acidophilus 1 billion cell Cap Take 1 capsule by mouth 3 (three) times daily with meals. 60 capsule 0    lactulose (CHRONULAC) 20 gram/30 mL Soln Take 15 mLs (10 g total) by mouth once daily. 450 mL 2    lancets Misc To check BG 2 times  daily, to use with insurance preferred meter 100 each 5    LIDOcaine (LIDODERM) 5 % APPLY PATCH ONTO THE SKIN. REMOVE AND DISCARD PATCH WITHIN 12 HOURS OR AS DIRECTED BY MD 30 patch 3    lisinopriL (PRINIVIL,ZESTRIL) 20 MG tablet Take 1 tablet (20 mg total) by mouth once daily. 90 tablet 3    melatonin 10 mg Tab Take 10 mg by mouth nightly.      metFORMIN (GLUCOPHAGE) 500 MG tablet TAKE 1 TABLET EVERY DAY WITH BREAKFAST 90 tablet 3    multivitamin (THERAGRAN) tablet Take 1 tablet by mouth once daily.      nystatin (NYSTOP) powder APPLY TOPICALLY TO THE AFFECTED AREA TWICE DAILY 120 g 11    nystatin-triamcinolone (MYCOLOG II) cream Apply topically 3 (three) times daily. Apply to affected area 30 g 1    ondansetron (ZOFRAN-ODT) 8 MG TbDL Take 1 tablet (8 mg total) by mouth every 12 (twelve) hours as needed. 40 tablet 3    potassium chloride SA (K-DUR,KLOR-CON) 20 MEQ tablet Take 1 tablet (20 mEq total) by mouth once daily. 90 tablet 3    spironolactone (ALDACTONE) 25 MG tablet Take 1 tablet (25 mg total) by mouth 3 (three) times a week. 90 tablet 4    tirzepatide (MOUNJARO) 2.5 mg/0.5 mL PnIj Inject 2.5 mg into the skin every 7 days. 4 pen 2    TRELEGY ELLIPTA 100-62.5-25 mcg DsDv INHALE 1 PUFF EVERY DAY. EXPIRES 42 DAYS AFTER OPENING FOIL TRAY 90 each 3     Family History       Problem Relation (Age of Onset)    Alcohol abuse Mother    Arrhythmia Father    Arthritis Father, Sister    Blindness Son    Cancer Brother    Cirrhosis Mother    Coronary artery disease Father, Sister    Crohn's disease Sister    Early death Sister (30)    Heart attack Father, Sister    Heart disease Father, Sister (32), Sister    Lung cancer Brother    No Known Problems Brother, Daughter          Tobacco Use    Smoking status: Former     Current packs/day: 0.00     Average packs/day: 0.3 packs/day for 54.4 years (16.3 ttl pk-yrs)     Types: Cigarettes     Start date: 1968     Quit date: 2022     Years since quittin.8     Smokeless tobacco: Never   Substance and Sexual Activity    Alcohol use: No     Alcohol/week: 0.0 standard drinks of alcohol    Drug use: No    Sexual activity: Not Currently     Review of Systems   Constitutional:  Negative for activity change, appetite change, chills, diaphoresis, fatigue and fever.   Respiratory:  Negative for cough, shortness of breath and wheezing.    Cardiovascular:  Negative for chest pain and leg swelling.   Gastrointestinal:  Positive for abdominal distention. Negative for abdominal pain, constipation, nausea and vomiting.   Genitourinary:  Negative for difficulty urinating.   Neurological:  Negative for dizziness, light-headedness and headaches.     Objective:     Vital Signs (Most Recent):  Temp: 100.1 °F (37.8 °C) (04/21/24 0157)  Pulse: 87 (04/21/24 0430)  Resp: 20 (04/21/24 0332)  BP: (!) 119/55 (04/21/24 0430)  SpO2: 95 % (04/21/24 0430) Vital Signs (24h Range):  Temp:  [100.1 °F (37.8 °C)] 100.1 °F (37.8 °C)  Pulse:  [] 87  Resp:  [20-26] 20  SpO2:  [92 %-96 %] 95 %  BP: (119-145)/(55-87) 119/55     Weight: (!) 158.8 kg (350 lb)  Body mass index is 53.22 kg/m².     Physical Exam  Vitals and nursing note reviewed.   Constitutional:       Appearance: She is obese. She is ill-appearing.   Cardiovascular:      Rate and Rhythm: Tachycardia present.      Pulses: Normal pulses.   Pulmonary:      Effort: Pulmonary effort is normal. No respiratory distress.      Breath sounds: Normal breath sounds. No wheezing.   Abdominal:      General: Bowel sounds are normal. There is distension.      Tenderness: There is abdominal tenderness (LLQ) in the left lower quadrant.      Comments: Large obese abdomen with hernia   Musculoskeletal:      Right lower leg: No edema.      Left lower leg: No edema.   Skin:     General: Skin is warm.      Capillary Refill: Capillary refill takes less than 2 seconds.      Findings: Erythema and wound (LLQ dressing cdi) present.      Comments: LLQ abdominal wound;  "possible cellulitis. Dsg CDI.   Neurological:      Mental Status: She is alert and oriented to person, place, and time. Mental status is at baseline.                Significant Labs: All pertinent labs within the past 24 hours have been reviewed.  A1C:   Recent Labs   Lab 24  0145   HGBA1C 6.0     ABGs:   Recent Labs   Lab 24  0230   PH 7.484*   PCO2 37.2   HCO3 28.0   POCSATURATED 95   BE 5*   PO2 68*     BMP:   Recent Labs   Lab 24  0240   *      K 4.9   CL 96   CO2 24   BUN 14   CREATININE 0.7   CALCIUM 10.4   MG 1.8     CBC:   Recent Labs   Lab 24  0240   WBC 13.92*   HGB 15.1   HCT 48.4        CMP:   Recent Labs   Lab 24  0240      K 4.9   CL 96   CO2 24   *   BUN 14   CREATININE 0.7   CALCIUM 10.4   PROT 7.7   ALBUMIN 3.8   BILITOT 0.4   ALKPHOS 100   AST 26   ALT 19   ANIONGAP 16     Cardiac Markers:   Recent Labs   Lab 24  0240   *     Coagulation:   Recent Labs   Lab 24  0240   INR 1.0   APTT 23.7     Lactic Acid: No results for input(s): "LACTATE" in the last 48 hours.  Lipase:   Recent Labs   Lab 24  0240   LIPASE 32       Magnesium:   Recent Labs   Lab 24  0240   MG 1.8       Troponin:   Recent Labs   Lab 24  0240   TROPONINI 0.022     Urine Studies:   Recent Labs   Lab 24  0311   COLORU Yellow   APPEARANCEUA Clear   PHUR 7.0   SPECGRAV 1.015   PROTEINUA Negative   GLUCUA 4+*   KETONESU Negative   BILIRUBINUA Negative   OCCULTUA Negative   NITRITE Negative   UROBILINOGEN Negative   LEUKOCYTESUR Trace*   RBCUA 2   WBCUA 6*   BACTERIA Rare   SQUAMEPITHEL 2       Significant Imaging: I have reviewed all pertinent imaging results/findings within the past 24 hours.    Patient Name: NYLA GIBBS MRN: [id number]  (Age): 1950 (73) Gender: F Date of Exam: 2024 Accession: [id number] Referring Physician: DEE JAMES # of Images: 1 Ordered As:   XR CHEST 1 VIEW FRONTAL    Support  " [phone]  access.[email or web address] CONFIDENTIALITY STATEMENT This report is intended only for use by the referring physician, and only in accordance with law. If you received this in error, call [phone]. Page 1 of 1 PROCEDURE INFORMATION: Exam: XR Chest Exam date and time: 4/21/2024 2:35 AM Age: 73 years old Clinical indication: Shortness of breath; Patient HX: SOB TECHNIQUE: Imaging protocol: Radiologic exam of the chest. Views: 1 view. COMPARISON: DX XR CHEST PA AND LATERAL 2/23/2024 12:14 PM FINDINGS: Lungs: Trace pulmonary vascular congestion. Pleural spaces: Unremarkable. No pleural effusion. No pneumothorax. Heart/Mediastinum: There is mild to moderate cardiomegaly. Bones/joints: Unremarkable. IMPRESSION: Trace pulmonary vascular congestion. Dictated and Authenticated by: Domenico Rodriguez MD 04/21/2024 3:15 AM Central Time ([place] & [place])  Assessment/Plan:     * Severe sepsis  This patient does have evidence of infective focus  My overall impression is sepsis.  Source: Urinary Tract and Skin and Soft Tissue (location LLQ)  Antibiotics given-   Antibiotics (72h ago, onward)      Start     Stop Route Frequency Ordered    04/22/24 0200  cefTRIAXone (Rocephin) 1 g in dextrose 5 % in water (D5W) 100 mL IVPB (MB+)         -- IV Every 24 hours (non-standard times) 04/21/24 0544    04/21/24 1000  piperacillin-tazobactam (ZOSYN) 4.5 g in dextrose 5 % in water (D5W) 100 mL IVPB (MB+)         -- IV Every 8 hours (non-standard times) 04/21/24 0544          Latest lactate reviewed-  Recent Labs   Lab 04/21/24  0230   POCLAC 1.91*         Fluid challenge Contraindicated- Fluid bolus is contraindicated in this patient due to Congestive Heart Failure     Post- resuscitation assessment No - Post resuscitation assessment not needed         Source control achieved by: antibiotics    Abdominal wall cellulitis  -See sepsis  -wound care consult      Cystitis  See sepsis      Chronic obstructive pulmonary disease (COPD)  Patient's COPD  "is controlled currently.  Patient is currently off COPD Pathway. Continue scheduled inhalers Antibiotics and Supplemental oxygen and monitor respiratory status closely.     Type 2 diabetes mellitus with diabetic neuropathy, without long-term current use of insulin  Patient's FSGs are uncontrolled due to hyperglycemia on current medication regimen.  Last A1c reviewed-   Lab Results   Component Value Date    HGBA1C 6.0 02/19/2024     Most recent fingerstick glucose reviewed- No results for input(s): "POCTGLUCOSE" in the last 24 hours.  Current correctional scale  Low  Maintain anti-hyperglycemic dose as follows-   Antihyperglycemics (From admission, onward)      Start     Stop Route Frequency Ordered    04/21/24 0609  insulin aspart U-100 pen 0-5 Units         -- SubQ Before meals & nightly PRN 04/21/24 0512          Hold Oral hypoglycemics while patient is in the hospital.        Hypertension associated with diabetes  Hypertension which is uncontrolled.  Latest blood pressure and vitals reviewed-   Temp:  [100.1 °F (37.8 °C)]   Pulse:  []   Resp:  [20-26]   BP: (119-145)/(55-87)   SpO2:  [92 %-96 %] .   Patient currently off IV antihypertensives.   Home meds for hypertension were reviewed and noted below.   Hypertension Medications               furosemide (LASIX) 40 MG tablet Take 1 tablet (40 mg total) by mouth once daily.    lisinopriL (PRINIVIL,ZESTRIL) 20 MG tablet Take 1 tablet (20 mg total) by mouth once daily.    spironolactone (ALDACTONE) 25 MG tablet Take 1 tablet (25 mg total) by mouth 3 (three) times a week.                Will goal for controlled BP reduction as noted be medications noted above and monitor and mitigate end organ damage as indicated.        Chronic atrial fibrillation  Patient with Permanent atrial fibrillation which is uncontrolled currently with Beta Blocker. Patient is currently in atrial fibrillation.[id number] Score: 4.  Anticoagulation indicated. Anticoagulation done with " Eliquis .      VTE Risk Mitigation (From admission, onward)           Ordered     apixaban tablet 5 mg  2 times daily         04/21/24 0512     IP VTE HIGH RISK PATIENT  Once         04/21/24 0512     Place sequential compression device  Until discontinued         04/21/24 0512                                    Evelyn Townsend, SAMUEL  Department of Hospital Medicine  [facility]

## 2024-04-21 NOTE — SUBJECTIVE & OBJECTIVE
Past Medical History:   Diagnosis Date    *Atrial flutter     Angina pectoris 2017    Anxiety     Arthritis     Asthma     Atrial fibrillation     Back pain     Cataract     OD    Chest pain 2017    CHF (congestive heart failure)     Chronically on benzodiazepine therapy 2019    COPD (chronic obstructive pulmonary disease)     Depression     Diabetes mellitus     Emphysema of lung     Heart failure     Hepatomegaly 2/3/2016    Hernia     History of MI (myocardial infarction) 2016    Hypercapnic respiratory failure, chronic 2016    Hyperlipidemia     Hypertension     Iron deficiency anemia 2/3/2016    Myocardial infarction     Obesity     Peripheral vascular disease     Pneumonia     Polyneuropathy     Retinal detachment     OS    Septic shock 2017    Skin ulcer 3/18/2017    Tobacco dependence     Type II 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, not stated as uncontrolled(250.60)        Past Surgical History:   Procedure Laterality Date    BREAST BIOPSY Left 10 yrs ago    benign    CATARACT EXTRACTION Left     OS     CATARACT EXTRACTION W/  INTRAOCULAR LENS IMPLANT Right 2023    Procedure: CEIOL OD;  Surgeon: David Bautista MD;  Location: [facility];  Service: Ophthalmology;  Laterality: Right;     SECTION      x2    EYE SURGERY      HYSTERECTOMY      partial    OOPHORECTOMY      one ovary conserved    RETINAL DETACHMENT SURGERY      buckle --OS    TONSILLECTOMY         Review of patient's allergies indicates:   Allergen Reactions    Dilaudid [hydromorphone] Anaphylaxis     Other reaction(s): Anaphylaxis  Other reaction(s): Unknown    Zyvox [linezolid in dextrose 5%] Shortness Of Breath       Current Facility-Administered Medications   Medication Dose Route Frequency Provider Last Rate Last Admin    acetaminophen tablet 650 mg  650 mg Oral Q4H PRN Evelyn Townsend, SAMUEL        albuterol-ipratropium 2.5 mg-0.5 mg/3 mL nebulizer solution 3 mL  3  mL Nebulization Q6H WAKE Evelyn Townsend DNP        aluminum-magnesium hydroxide-simethicone 200-200-20 mg/5 mL suspension 30 mL  30 mL Oral QID PRN Evelyn Townsend DNP        apixaban tablet 5 mg  5 mg Oral BID Evelyn Townsend DNP        atorvastatin tablet 10 mg  10 mg Oral Daily Evelyn Townsend DNP        budesonide nebulizer solution 0.25 mg  0.25 mg Nebulization Daily Evelyn Townsend DNP        dextrose 10% bolus 125 mL 125 mL  12.5 g Intravenous PRN Evelyn Townsend DNP        dextrose 10% bolus 250 mL 250 mL  25 g Intravenous PRN Evelyn Townsend DNP        furosemide tablet 40 mg  40 mg Oral Daily Evelyn Townsend DNP        gabapentin capsule 200 mg  200 mg Oral TID Evelyn Townsend DNP        glucagon (human recombinant) injection 1 mg  1 mg Intramuscular PRN Evelyn Townsend DNP        glucose chewable tablet 16 g  16 g Oral PRN Evelyn Townsend DNP        glucose chewable tablet 24 g  24 g Oral PRN Evelyn Townsend DNP        insulin aspart U-100 pen 0-5 Units  0-5 Units Subcutaneous QID (AC + HS) PRN Evelyn Townsend DNP        lisinopriL tablet 20 mg  20 mg Oral Daily Evelyn Townsend DNP        magnesium oxide tablet 800 mg  800 mg Oral PRN Evelyn Townsend DNP        magnesium oxide tablet 800 mg  800 mg Oral PRN Evelyn Townsend DNP        melatonin tablet 9 mg  9 mg Oral Nightly PRN Evelyn Townsend DNP        morphine injection 2 mg  2 mg Intravenous Q4H PRN Evelyn Townsend DNP        naloxone 0.4 mg/mL injection 0.02 mg  0.02 mg Intravenous PRN Evelyn Townsend DNP        ondansetron injection 4 mg  4 mg Intravenous Q6H PRN Evelyn Townsend DNP        ondansetron injection 4 mg  4 mg Intravenous Q6H PRN Evelyn Townsend DNP        potassium bicarbonate disintegrating tablet 35 mEq  35 mEq Oral PRN Evelyn Townsend DNP        potassium bicarbonate disintegrating tablet 50 mEq  50 mEq Oral PRN Evelyn Townsend DNP        potassium bicarbonate disintegrating tablet 60 mEq  60 mEq Oral PRN  Evelyn Townsend DNP        senna-docusate 8.6-50 mg per tablet 1 tablet  1 tablet Oral BID PRN Evelyn Townsend DNP        sodium chloride 0.9% flush 10 mL  10 mL Intravenous Q12H PRN Evelyn Townsend DNP        [START ON 4/22/2024] spironolactone tablet 25 mg  25 mg Oral Once per day on Monday Wednesday Friday Evelyn Townsend DNP         Current Outpatient Medications   Medication Sig Dispense Refill    albuterol (PROVENTIL/VENTOLIN HFA) 90 mcg/actuation inhaler INHALE 2 PUFFS EVERY 6 HOURS AS NEEDED FOR WHEEZING (RESCUE) 18 g 5    albuterol-ipratropium (DUO-NEB) 2.5 mg-0.5 mg/3 mL nebulizer solution Take 3 mLs by nebulization every 6 (six) hours as needed for Wheezing. Rescue 75 mL 0    ascorbic acid, vitamin C, (VITAMIN C) 500 MG tablet Take 2 tablets (1,000 mg total) by mouth every evening.      atorvastatin (LIPITOR) 10 MG tablet TAKE 1 TABLET(10 MG) BY MOUTH EVERY OTHER DAY 35 tablet 3    blood sugar diagnostic Strp To check BG 2 times daily, to use with insurance preferred meter 100 each 5    blood-glucose meter kit To check BG 2 times daily, to use with insurance preferred meter 1 each 1    budesonide (PULMICORT) 0.25 mg/2 mL nebulizer solution Take 2 mLs (0.25 mg total) by nebulization once daily. Controller 20 each 3    ELIQUIS 5 mg Tab TAKE 1 TABLET TWICE DAILY 180 tablet 3    empagliflozin (JARDIANCE) 25 mg tablet Take 1 tablet (25 mg total) by mouth once daily. 90 tablet 4    fluticasone propionate (FLONASE) 50 mcg/actuation nasal spray SHAKE LIQUID AND USE 1 SPRAY IN EACH NOSTRIL EVERY DAY 48 g 3    furosemide (LASIX) 40 MG tablet Take 1 tablet (40 mg total) by mouth once daily. 90 tablet 9    gabapentin (NEURONTIN) 800 MG tablet TAKE 1 TABLET(800 MG) BY MOUTH THREE TIMES DAILY 90 tablet 11    HYDROcodone-acetaminophen (NORCO) 7.5-325 mg per tablet Take 1 tablet by mouth every 12 (twelve) hours as needed for Pain. Medical necessary for greater than 7 days for chronic pain. 60 tablet 0    hydrOXYzine  HCL (ATARAX) 50 MG tablet Take 1 tablet (50 mg total) by mouth nightly as needed for Itching. (Patient not taking: Reported on 2/27/2024) 90 tablet 2    Lactobacillus acidophilus 1 billion cell Cap Take 1 capsule by mouth 3 (three) times daily with meals. 60 capsule 0    lactulose (CHRONULAC) 20 gram/30 mL Soln Take 15 mLs (10 g total) by mouth once daily. 450 mL 2    lancets Misc To check BG 2 times daily, to use with insurance preferred meter 100 each 5    LIDOcaine (LIDODERM) 5 % APPLY PATCH ONTO THE SKIN. REMOVE AND DISCARD PATCH WITHIN 12 HOURS OR AS DIRECTED BY MD 30 patch 3    lisinopriL (PRINIVIL,ZESTRIL) 20 MG tablet Take 1 tablet (20 mg total) by mouth once daily. 90 tablet 3    melatonin 10 mg Tab Take 10 mg by mouth nightly.      metFORMIN (GLUCOPHAGE) 500 MG tablet TAKE 1 TABLET EVERY DAY WITH BREAKFAST 90 tablet 3    multivitamin (THERAGRAN) tablet Take 1 tablet by mouth once daily.      nystatin (NYSTOP) powder APPLY TOPICALLY TO THE AFFECTED AREA TWICE DAILY 120 g 11    nystatin-triamcinolone (MYCOLOG II) cream Apply topically 3 (three) times daily. Apply to affected area 30 g 1    ondansetron (ZOFRAN-ODT) 8 MG TbDL Take 1 tablet (8 mg total) by mouth every 12 (twelve) hours as needed. 40 tablet 3    potassium chloride SA (K-DUR,KLOR-CON) 20 MEQ tablet Take 1 tablet (20 mEq total) by mouth once daily. 90 tablet 3    spironolactone (ALDACTONE) 25 MG tablet Take 1 tablet (25 mg total) by mouth 3 (three) times a week. 90 tablet 4    tirzepatide (MOUNJARO) 2.5 mg/0.5 mL PnIj Inject 2.5 mg into the skin every 7 days. 4 pen 2    TRELEGY ELLIPTA 100-62.5-25 mcg DsDv INHALE 1 PUFF EVERY DAY. EXPIRES 42 DAYS AFTER OPENING FOIL TRAY 90 each 3     Family History       Problem Relation (Age of Onset)    Alcohol abuse Mother    Arrhythmia Father    Arthritis Father, Sister    Blindness Son    Cancer Brother    Cirrhosis Mother    Coronary artery disease Father, Sister    Crohn's disease Sister    Early death  Sister (30)    Heart attack Father, Sister    Heart disease Father, Sister (32), Sister    Lung cancer Brother    No Known Problems Brother, Daughter          Tobacco Use    Smoking status: Former     Current packs/day: 0.00     Average packs/day: 0.3 packs/day for 54.4 years (16.3 ttl pk-yrs)     Types: Cigarettes     Start date: 1968     Quit date: 2022     Years since quittin.8    Smokeless tobacco: Never   Substance and Sexual Activity    Alcohol use: No     Alcohol/week: 0.0 standard drinks of alcohol    Drug use: No    Sexual activity: Not Currently     Review of Systems   Constitutional:  Negative for activity change, appetite change, chills, diaphoresis, fatigue and fever.   Respiratory:  Negative for cough, shortness of breath and wheezing.    Cardiovascular:  Negative for chest pain and leg swelling.   Gastrointestinal:  Positive for abdominal distention. Negative for abdominal pain, constipation, nausea and vomiting.   Genitourinary:  Negative for difficulty urinating.   Neurological:  Negative for dizziness, light-headedness and headaches.     Objective:     Vital Signs (Most Recent):  Temp: 100.1 °F (37.8 °C) (24 0157)  Pulse: 87 (24 0430)  Resp: 20 (24 0332)  BP: (!) 119/55 (24 0430)  SpO2: 95 % (24 0430) Vital Signs (24h Range):  Temp:  [100.1 °F (37.8 °C)] 100.1 °F (37.8 °C)  Pulse:  [] 87  Resp:  [20-26] 20  SpO2:  [92 %-96 %] 95 %  BP: (119-145)/(55-87) 119/55     Weight: (!) 158.8 kg (350 lb)  Body mass index is 53.22 kg/m².     Physical Exam  Vitals and nursing note reviewed.   Constitutional:       Appearance: She is obese. She is ill-appearing.   Cardiovascular:      Rate and Rhythm: Tachycardia present.      Pulses: Normal pulses.   Pulmonary:      Effort: Pulmonary effort is normal. No respiratory distress.      Breath sounds: Normal breath sounds. No wheezing.   Abdominal:      General: Bowel sounds are normal. There is distension.       "Tenderness: There is abdominal tenderness (LLQ) in the left lower quadrant.      Comments: Large obese abdomen with hernia   Musculoskeletal:      Right lower leg: No edema.      Left lower leg: No edema.   Skin:     General: Skin is warm.      Capillary Refill: Capillary refill takes less than 2 seconds.      Findings: Erythema and wound (LLQ dressing cdi) present.      Comments: LLQ abdominal wound; possible cellulitis. Dsg CDI.   Neurological:      Mental Status: She is alert and oriented to person, place, and time. Mental status is at baseline.                Significant Labs: All pertinent labs within the past 24 hours have been reviewed.  A1C:   Recent Labs   Lab 02/19/24  0145   HGBA1C 6.0     ABGs:   Recent Labs   Lab 04/21/24  0230   PH 7.484*   PCO2 37.2   HCO3 28.0   POCSATURATED 95   BE 5*   PO2 68*     BMP:   Recent Labs   Lab 04/21/24  0240   *      K 4.9   CL 96   CO2 24   BUN 14   CREATININE 0.7   CALCIUM 10.4   MG 1.8     CBC:   Recent Labs   Lab 04/21/24  0240   WBC 13.92*   HGB 15.1   HCT 48.4        CMP:   Recent Labs   Lab 04/21/24  0240      K 4.9   CL 96   CO2 24   *   BUN 14   CREATININE 0.7   CALCIUM 10.4   PROT 7.7   ALBUMIN 3.8   BILITOT 0.4   ALKPHOS 100   AST 26   ALT 19   ANIONGAP 16     Cardiac Markers:   Recent Labs   Lab 04/21/24  0240   *     Coagulation:   Recent Labs   Lab 04/21/24  0240   INR 1.0   APTT 23.7     Lactic Acid: No results for input(s): "LACTATE" in the last 48 hours.  Lipase:   Recent Labs   Lab 04/21/24  0240   LIPASE 32       Magnesium:   Recent Labs   Lab 04/21/24  0240   MG 1.8       Troponin:   Recent Labs   Lab 04/21/24  0240   TROPONINI 0.022     Urine Studies:   Recent Labs   Lab 04/21/24  0311   COLORU Yellow   APPEARANCEUA Clear   PHUR 7.0   SPECGRAV 1.015   PROTEINUA Negative   GLUCUA 4+*   KETONESU Negative   BILIRUBINUA Negative   OCCULTUA Negative   NITRITE Negative   UROBILINOGEN Negative   LEUKOCYTESUR Trace* "   RBCUA 2   WBCUA 6*   BACTERIA Rare   SQUAMEPITHEL 2       Significant Imaging: I have reviewed all pertinent imaging results/findings within the past 24 hours.    Patient Name: NYLA GIBBS MRN: [id number]  (Age): 1950 (73) Gender: F Date of Exam: 2024 Accession: [id number] Referring Physician: DEE JAMES # of Images: 1 Ordered As:   XR CHEST 1 VIEW FRONTAL    Support  [phone]  [email or web address] CONFIDENTIALITY STATEMENT This report is intended only for use by the referring physician, and only in accordance with law. If you received this in error, call [phone]. Page 1 of 1 PROCEDURE INFORMATION: Exam: XR Chest Exam date and time: 2024 2:35 AM Age: 73 years old Clinical indication: Shortness of breath; Patient HX: SOB TECHNIQUE: Imaging protocol: Radiologic exam of the chest. Views: 1 view. COMPARISON: DX XR CHEST PA AND LATERAL 2024 12:14 PM FINDINGS: Lungs: Trace pulmonary vascular congestion. Pleural spaces: Unremarkable. No pleural effusion. No pneumothorax. Heart/Mediastinum: There is mild to moderate cardiomegaly. Bones/joints: Unremarkable. IMPRESSION: Trace pulmonary vascular congestion. Dictated and Authenticated by: Domenico Rodriguez MD 2024 3:15 AM Central Time ([place] & [place])

## 2024-04-21 NOTE — PLAN OF CARE
Problem: Adult Inpatient Plan of Care  Goal: Plan of Care Review  Outcome: Ongoing, Progressing  Goal: Patient-Specific Goal (Individualized)  Outcome: Ongoing, Progressing  Goal: Absence of Hospital-Acquired Illness or Injury  Outcome: Ongoing, Progressing  Goal: Optimal Comfort and Wellbeing  Outcome: Ongoing, Progressing  Goal: Readiness for Transition of Care  Outcome: Ongoing, Progressing     Problem: Bariatric Environmental Safety  Goal: Safety Maintained with Care  Outcome: Ongoing, Progressing     Problem: Skin Injury Risk Increased  Goal: Skin Health and Integrity  Outcome: Ongoing, Progressing     Problem: Diabetes Comorbidity  Goal: Blood Glucose Level Within Targeted Range  Outcome: Ongoing, Progressing     Problem: Adjustment to Illness (Sepsis/Septic Shock)  Goal: Optimal Coping  Outcome: Ongoing, Progressing     Problem: Bleeding (Sepsis/Septic Shock)  Goal: Absence of Bleeding  Outcome: Ongoing, Progressing     Problem: Glycemic Control Impaired (Sepsis/Septic Shock)  Goal: Blood Glucose Level Within Desired Range  Outcome: Ongoing, Progressing     Problem: Infection Progression (Sepsis/Septic Shock)  Goal: Absence of Infection Signs and Symptoms  Outcome: Ongoing, Progressing     Problem: Nutrition Impaired (Sepsis/Septic Shock)  Goal: Optimal Nutrition Intake  Outcome: Ongoing, Progressing     Problem: Fluid Imbalance (Pneumonia)  Goal: Fluid Balance  Outcome: Ongoing, Progressing     Problem: Infection (Pneumonia)  Goal: Resolution of Infection Signs and Symptoms  Outcome: Ongoing, Progressing     Problem: Respiratory Compromise (Pneumonia)  Goal: Effective Oxygenation and Ventilation  Outcome: Ongoing, Progressing

## 2024-04-21 NOTE — ED PROVIDER NOTES
Encounter Date: 2024       History     Chief Complaint   Patient presents with    Wound Infection     sore on left lower abdomen with foul drainage     73-year-old female presents with concern for sepsis.  Patient reports drainage from wound on abdominal wall.  No associated trauma.  Multiple comorbidities.    The history is provided by the patient.     Review of patient's allergies indicates:   Allergen Reactions    Dilaudid [hydromorphone] Anaphylaxis     Other reaction(s): Anaphylaxis  Other reaction(s): Unknown    Zyvox [linezolid in dextrose 5%] Shortness Of Breath     Past Medical History:   Diagnosis Date    *Atrial flutter     Angina pectoris 2017    Anxiety     Arthritis     Asthma     Atrial fibrillation     Back pain     Cataract     OD    Chest pain 2017    CHF (congestive heart failure)     Chronically on benzodiazepine therapy 2019    COPD (chronic obstructive pulmonary disease)     Depression     Diabetes mellitus     Emphysema of lung     Heart failure     Hepatomegaly 2/3/2016    Hernia     History of MI (myocardial infarction) 2016    Hypercapnic respiratory failure, chronic 2016    Hyperlipidemia     Hypertension     Iron deficiency anemia 2/3/2016    Myocardial infarction     Obesity     Peripheral vascular disease     Pneumonia     Polyneuropathy     Retinal detachment     OS    Septic shock 2017    Skin ulcer 3/18/2017    Tobacco dependence     Type II 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, not stated as uncontrolled(250.60)      Past Surgical History:   Procedure Laterality Date    BREAST BIOPSY Left 10 yrs ago    benign    CATARACT EXTRACTION Left     OS     CATARACT EXTRACTION W/  INTRAOCULAR LENS IMPLANT Right 2023    Procedure: CEIOL OD;  Surgeon: David Bautista MD;  Location: [facility] OR;  Service: Ophthalmology;  Laterality: Right;     SECTION      x2    EYE SURGERY      HYSTERECTOMY      partial     OOPHORECTOMY      one ovary conserved    RETINAL DETACHMENT SURGERY      buckle --OS    TONSILLECTOMY       Family History   Problem Relation Name Age of Onset    Alcohol abuse Mother      Cirrhosis Mother      Arthritis Father      Heart disease Father      Heart attack Father      Arrhythmia Father      Coronary artery disease Father      Coronary artery disease Sister      Early death Sister  30        heart disease    Heart disease Sister  32        MI    Heart attack Sister      Arthritis Sister      Heart disease Sister      Crohn's disease Sister      Cancer Brother          Lung cancer    Lung cancer Brother      No Known Problems Brother      No Known Problems Daughter x1     Blindness Son x1         due to accident//    Breast cancer Neg Hx      Glaucoma Neg Hx      Macular degeneration Neg Hx      Retinal detachment Neg Hx      Amblyopia Neg Hx      Diabetes Neg Hx      Cataracts Neg Hx      Hypertension Neg Hx      Strabismus Neg Hx      Stroke Neg Hx      Thyroid disease Neg Hx       Social History     Tobacco Use    Smoking status: Former     Current packs/day: 0.00     Average packs/day: 0.3 packs/day for 54.4 years (16.3 ttl pk-yrs)     Types: Cigarettes     Start date: 1968     Quit date: 2022     Years since quittin.8    Smokeless tobacco: Never   Substance Use Topics    Alcohol use: No     Alcohol/week: 0.0 standard drinks of alcohol    Drug use: No     Review of Systems   All other systems reviewed and are negative.      Physical Exam     Initial Vitals [24 0157]   BP Pulse Resp Temp SpO2   (!) 145/87 110 (!) 26 100.1 °F (37.8 °C) (!) 92 %      MAP       --         Physical Exam    Nursing note and vitals reviewed.  Constitutional: She is not diaphoretic.   HENT:   Head: Normocephalic.   Eyes: No scleral icterus.   Cardiovascular:  Normal rate.           Pulmonary/Chest: Effort normal. No stridor.   On nasal cannula   Abdominal:   No abdominal tenderness palpation There is no  guarding.     Neurological: She is alert.   Skin: There is erythema.   Skin wounds on left-sided abdominal wall         ED Course   Procedures  Labs Reviewed   CBC W/ AUTO DIFFERENTIAL - Abnormal; Notable for the following components:       Result Value    WBC 13.92 (*)     MCV 99 (*)     MCHC 31.2 (*)     Gran # (ANC) 10.9 (*)     Immature Grans (Abs) 0.06 (*)     Mono # 1.2 (*)     Gran % 78.1 (*)     Lymph % 9.6 (*)     All other components within normal limits   COMPREHENSIVE METABOLIC PANEL - Abnormal; Notable for the following components:    Glucose 119 (*)     All other components within normal limits   URINALYSIS, REFLEX TO URINE CULTURE - Abnormal; Notable for the following components:    Glucose, UA 4+ (*)     Leukocytes, UA Trace (*)     All other components within normal limits    Narrative:     Specimen Source->Urine   B-TYPE NATRIURETIC PEPTIDE - Abnormal; Notable for the following components:     (*)     All other components within normal limits   URINALYSIS MICROSCOPIC - Abnormal; Notable for the following components:    WBC, UA 6 (*)     All other components within normal limits    Narrative:     Specimen Source->Urine   ISTAT PROCEDURE - Abnormal; Notable for the following components:    POC PH 7.484 (*)     POC PO2 68 (*)     POC BE 5 (*)     POC Lactate 1.91 (*)     POC TCO2 29 (*)     All other components within normal limits   INFLUENZA A & B BY MOLECULAR   CULTURE, BLOOD   CULTURE, BLOOD   MAGNESIUM   APTT   PROTIME-INR   LIPASE   TROPONIN I   PROCALCITONIN   SARS-COV-2 RNA AMPLIFICATION, QUAL   LACTIC ACID, PLASMA   POCT GLUCOSE, HAND-HELD DEVICE          Imaging Results              X-Ray Chest AP Portable (In process)                      Medications   atorvastatin tablet 10 mg (has no administration in time range)   budesonide nebulizer solution 0.25 mg (has no administration in time range)   apixaban tablet 5 mg (has no administration in time range)   furosemide tablet 40 mg (has  no administration in time range)   gabapentin capsule 200 mg (has no administration in time range)   lisinopriL tablet 20 mg (has no administration in time range)   spironolactone tablet 25 mg (has no administration in time range)   sodium chloride 0.9% flush 10 mL (has no administration in time range)   melatonin tablet 9 mg (has no administration in time range)   senna-docusate 8.6-50 mg per tablet 1 tablet (has no administration in time range)   naloxone 0.4 mg/mL injection 0.02 mg (has no administration in time range)   potassium bicarbonate disintegrating tablet 50 mEq (has no administration in time range)   potassium bicarbonate disintegrating tablet 35 mEq (has no administration in time range)   potassium bicarbonate disintegrating tablet 60 mEq (has no administration in time range)   magnesium oxide tablet 800 mg (has no administration in time range)   magnesium oxide tablet 800 mg (has no administration in time range)   glucose chewable tablet 16 g (has no administration in time range)   glucose chewable tablet 24 g (has no administration in time range)   glucagon (human recombinant) injection 1 mg (has no administration in time range)   acetaminophen tablet 650 mg (has no administration in time range)   morphine injection 2 mg (has no administration in time range)   ondansetron injection 4 mg (has no administration in time range)   ondansetron injection 4 mg (has no administration in time range)   insulin aspart U-100 pen 0-5 Units (has no administration in time range)   albuterol-ipratropium 2.5 mg-0.5 mg/3 mL nebulizer solution 3 mL (has no administration in time range)   aluminum-magnesium hydroxide-simethicone 200-200-20 mg/5 mL suspension 30 mL (has no administration in time range)   dextrose 10% bolus 125 mL 125 mL (has no administration in time range)   dextrose 10% bolus 250 mL 250 mL (has no administration in time range)   cefTRIAXone (Rocephin) 1 g in dextrose 5 % in water (D5W) 100 mL IVPB (MB+)  (has no administration in time range)   piperacillin-tazobactam (ZOSYN) 4.5 g in dextrose 5 % in water (D5W) 100 mL IVPB (MB+) (has no administration in time range)   cefTRIAXone (Rocephin) 1 g in dextrose 5 % in water (D5W) 100 mL IVPB (MB+) (0 g Intravenous Stopped 4/21/24 0313)   piperacillin-tazobactam (ZOSYN) 4.5 g in dextrose 5 % in water (D5W) 100 mL IVPB (MB+) (0 g Intravenous Stopped 4/21/24 0424)     Medical Decision Making  73-year-old female presents with concern for infection, on exam concern for abdominal wall cellulitis, no abdominal tenderness to palpation.  Lower suspicion for any surgical abdomen based off exam,   Lab work obtained demonstrated leukocytosis and patient meeting sepsis criteria.  Patient administered IV Rocephin and IV Zosyn and admitted to hospitalist team for further management evaluation.    Amount and/or Complexity of Data Reviewed  Labs: ordered. Decision-making details documented in ED Course.  Radiology: ordered.  ECG/medicine tests: ordered and independent interpretation performed.     Details: Rate 92, AFib, QRS 92, , no STEMI  Discussion of management or test interpretation with external provider(s): Spoke with Evelyn Townsend NP with hospitalist team will admit for further management evaluation      Risk  Decision regarding hospitalization.               ED Course as of 04/21/24 0606   Sun Apr 21, 2024   0322 Lipase: 32 [KB]   0322 Troponin I: 0.022 [KB]   0322 Magnesium : 1.8 [KB]   0322 PTT: 23.7 [KB]   0322 INR: 1.0 [KB]   0322 WBC(!): 13.92 [KB]   0322 Hemoglobin: 15.1 [KB]   0322 Hematocrit: 48.4 [KB]   0322 POC PH(!): 7.484 [KB]   0322 Influenza B, Molecular: Negative [KB]   0322 Influenza A, Molecular: Negative [KB]   0322 SARS-CoV-2 RNA, Amplification, Qual: Negative [KB]   0580 This patient does have evidence of infective focus  My overall impression is sepsis.  Source: Skin and Soft Tissue (location Abdominal Wall)  Antibiotics given- Antibiotics (72h ago,  onward)    None      Latest lactate reviewed-  @[id number](lactate,poclac)@  Organ dysfunction indicated by N/A    Fluid challenge Not needed - patient is not hypotensive      Post- resuscitation assessment No - Post resuscitation assessment not needed       Will Not start Pressors- Levophed for MAP of 65  Source control achieved by: Zosyn, Ceftriaxone     [KB]      ED Course User Index  [KB] Mehran Kevin Jr.,                            Clinical Impression:  Final diagnoses:  [A41.9] Sepsis (Primary)  [L03.311] Abdominal wall cellulitis  [I48.91] Atrial fibrillation, unspecified type          ED Disposition Condition    Admit Stable                Mehran Kevin Jr., DO  04/21/24 0442       Mehran Kevin Jr., DO  04/21/24 0538       Mehran Kevin Jr., DO  04/21/24 0606

## 2024-04-21 NOTE — ASSESSMENT & PLAN NOTE
This patient does have evidence of infective focus  My overall impression is sepsis.  Source: Urinary Tract and Skin and Soft Tissue (location LLQ)  Antibiotics given-   Antibiotics (72h ago, onward)      Start     Stop Route Frequency Ordered    04/22/24 0200  cefTRIAXone (Rocephin) 1 g in dextrose 5 % in water (D5W) 100 mL IVPB (MB+)         -- IV Every 24 hours (non-standard times) 04/21/24 0544    04/21/24 1000  piperacillin-tazobactam (ZOSYN) 4.5 g in dextrose 5 % in water (D5W) 100 mL IVPB (MB+)         -- IV Every 8 hours (non-standard times) 04/21/24 0544          Latest lactate reviewed-  Recent Labs   Lab 04/21/24  0230   POCLAC 1.91*         Fluid challenge Contraindicated- Fluid bolus is contraindicated in this patient due to Congestive Heart Failure     Post- resuscitation assessment No - Post resuscitation assessment not needed         Source control achieved by: antibiotics

## 2024-04-21 NOTE — HPI
Nelly Tian is a 73 year old female with past medical history of AFib/a flutter on Eliquis, CHF, COPD, diabetes mellitus, recurrent abdominal cellulitis, hypertension, iron-deficiency anemia, hyperlipidemia, MI, anxiety, and arthritis who presents with complaints of left lower abdominal wound with orange/yellowish drainage for over a week. She reports developing the wound after scratching her lower abdomen and her daughter has been performing the wound care with dressing changes. She denies chest pain, shortness of breath, fever, chills, dizziness, lightheadedness, abdominal pain, nausea or vomiting. She presents septic in ED with a T-max 100.1, tachypneic, tachycardic, WBC 13.9, lactic acid 1.91, and urinalysis positive WBCs/leukocytes/bacteria culture pending.  Chest x-ray significant for trace pulmonary vascular congestion. She was started on Rocephin and Zosyn.  Wound care consult placed.  Admitted to hospital medicine for further management and treatment.

## 2024-04-21 NOTE — PLAN OF CARE
Patient was seen and examined at bedside.  Patient got admitted earlier today due to sepsis and abdominal wall cellulitis.  Patient denied chest pain shortness of breath abdominal pain or any acute symptoms or concerns at this time.  On IV antibiotics, blood cultures pending. Will consult General surgery. Will continue to monitor.

## 2024-04-21 NOTE — ASSESSMENT & PLAN NOTE
"Patient's FSGs are uncontrolled due to hyperglycemia on current medication regimen.  Last A1c reviewed-   Lab Results   Component Value Date    HGBA1C 6.0 02/19/2024     Most recent fingerstick glucose reviewed- No results for input(s): "POCTGLUCOSE" in the last 24 hours.  Current correctional scale  Low  Maintain anti-hyperglycemic dose as follows-   Antihyperglycemics (From admission, onward)      Start     Stop Route Frequency Ordered    04/21/24 0609  insulin aspart U-100 pen 0-5 Units         -- SubQ Before meals & nightly PRN 04/21/24 0512          Hold Oral hypoglycemics while patient is in the hospital.      "

## 2024-04-21 NOTE — PT/OT/SLP PROGRESS
Physical Therapy      Patient Name:  Nelly Tian   MRN:  [id number]    Patient not seen today secondary to Patient unwilling to participate. Will follow-up 4/22/24.

## 2024-04-21 NOTE — PLAN OF CARE
[facility] - ED  Initial Discharge Assessment       Primary Care Provider: Constantine Bird MD    Admission Diagnosis: Sepsis [A41.9]    Admission Date: 4/21/2024  Expected Discharge Date:     Transition of Care Barriers: None    Payor: HU[employer]NA MANAGED MEDICARE / Plan: HU[employer]NA MEDICARE PPO / Product Type: Medicare Advantage /     Extended Emergency Contact Information  Primary Emergency Contact: Maye Tirado  Address: [place] [address] [facility]  Home Phone: [phone]  Work Phone: [phone]  Mobile Phone: [phone]  Relation: Daughter  Preferred language: English   needed? No  Secondary Emergency Contact: Sharda Finn  Mobile Phone: [phone]  Relation: Friend  Preferred language: English   needed? No    Discharge Plan A: Home Health  Discharge Plan B: Home with family      [facility] - [facility] [address]  [address] [facility] [address] [address]  Phone: [phone] Fax: [phone]    [id number] [address]  AT [facility]  [address] [address]  Phone: [phone] Fax: [phone]    SW met with patient at bedside to complete discharge planning assessment.  Patient alert and oriented xs 4.  Patient verified all demographic information on facesheet is correct.  Patient verified PCP is Dr. Bird.  Patient verified primary health insurance is [employer].  Patient active with [facility] [employer] and has listed DME.  Patient has home oxygen through [facility].  Patient with POA (daughter) and Living Will.  Patient not on dialysis or medication coumadin.  Patient with no 30 day admission.  Patient with no financial issues at this time.  Patient family will provide transportation upon discharge from facility.  Patient independent with ADLs, live with daughter, daughter drives.      Initial Assessment (most recent)       Adult  Discharge Assessment - 04/21/24 1501          Discharge Assessment    Assessment Type Discharge Planning Assessment     Confirmed/corrected address, phone number and insurance Yes     Confirmed Demographics Correct on Facesheet     Source of Information patient     Communicated KRISTOPHER with patient/caregiver Date not available/Unable to determine     People in Home child(bonny), adult     Facility Arrived From: home     Do you expect to return to your current living situation? Yes     Do you have help at home or someone to help you manage your care at home? Yes     Who are your caregiver(s) and their phone number(s)? daughter     Prior to hospitilization cognitive status: Alert/Oriented     Current cognitive status: Alert/Oriented     Walking or Climbing Stairs Difficulty yes     Walking or Climbing Stairs ambulation difficulty, requires equipment;stair climbing difficulty, requires equipment     Dressing/Bathing Difficulty yes     Dressing/Bathing bathing difficulty, requires equipment;dressing difficulty, assistance 1 person     Equipment Currently Used at Home nebulizer;oxygen;rollator;glucometer     Readmission within 30 days? No     Patient currently being followed by outpatient case management? No     Do you currently have service(s) that help you manage your care at home? Yes     Name and Contact number of agency [facility]     Is the pt/caregiver preference to resume services with current agency Yes     Do you take prescription medications? Yes     Do you have prescription coverage? Yes     Do you have any problems affording any of your prescribed medications? No     Is the patient taking medications as prescribed? yes     Who is going to help you get home at discharge? daughter     How do you get to doctors appointments? family or friend will provide     Are you on dialysis? No     Do you take coumadin? No   Eliquis    Discharge Plan A Home Health     Discharge Plan B Home with family     DME Needed Upon  Discharge  none     Discharge Plan discussed with: Patient     Transition of Care Barriers None

## 2024-04-21 NOTE — NURSING
Nurses Note -- 4 Eyes      4/21/2024   3:43 PM      Skin assessed during: Admit      [] No Altered Skin Integrity Present    []Prevention Measures Documented      [x] Yes- Altered Skin Integrity Present or Discovered   [x] LDA Added if Not in Epic (Describe Wound)   [x] New Altered Skin Integrity was Present on Admit and Documented in LDA   [x] Wound Image Taken    Wound Care Consulted? No (WC already consulted)    Attending Nurse:  Geoff Wise RN/Staff Member:  Sourav

## 2024-04-21 NOTE — ASSESSMENT & PLAN NOTE
Hypertension which is uncontrolled.  Latest blood pressure and vitals reviewed-   Temp:  [100.1 °F (37.8 °C)]   Pulse:  []   Resp:  [20-26]   BP: (119-145)/(55-87)   SpO2:  [92 %-96 %] .   Patient currently off IV antihypertensives.   Home meds for hypertension were reviewed and noted below.   Hypertension Medications               furosemide (LASIX) 40 MG tablet Take 1 tablet (40 mg total) by mouth once daily.    lisinopriL (PRINIVIL,ZESTRIL) 20 MG tablet Take 1 tablet (20 mg total) by mouth once daily.    spironolactone (ALDACTONE) 25 MG tablet Take 1 tablet (25 mg total) by mouth 3 (three) times a week.                Will goal for controlled BP reduction as noted be medications noted above and monitor and mitigate end organ damage as indicated.

## 2024-04-21 NOTE — ASSESSMENT & PLAN NOTE
Patient with Permanent atrial fibrillation which is uncontrolled currently with Beta Blocker. Patient is currently in atrial fibrillation.[id number] Score: 4.  Anticoagulation indicated. Anticoagulation done with Eliquis .

## 2024-04-22 LAB
ANION GAP SERPL CALC-SCNC: 11 MMOL/L (ref 8–16)
BASOPHILS # BLD AUTO: 0.05 K/UL (ref 0–0.2)
BASOPHILS NFR BLD: 0.3 % (ref 0–1.9)
BUN SERPL-MCNC: 18 MG/DL (ref 8–23)
CALCIUM SERPL-MCNC: 9.6 MG/DL (ref 8.7–10.5)
CHLORIDE SERPL-SCNC: 96 MMOL/L (ref 95–110)
CO2 SERPL-SCNC: 26 MMOL/L (ref 23–29)
CREAT SERPL-MCNC: 0.7 MG/DL (ref 0.5–1.4)
DIFFERENTIAL METHOD BLD: ABNORMAL
EOSINOPHIL # BLD AUTO: 0.1 K/UL (ref 0–0.5)
EOSINOPHIL NFR BLD: 0.7 % (ref 0–8)
ERYTHROCYTE [DISTWIDTH] IN BLOOD BY AUTOMATED COUNT: 14.6 % (ref 11.5–14.5)
EST. GFR  (NO RACE VARIABLE): >60 ML/MIN/1.73 M^2
GLUCOSE SERPL-MCNC: 107 MG/DL (ref 70–110)
HCT VFR BLD AUTO: 41.2 % (ref 37–48.5)
HGB BLD-MCNC: 13.2 G/DL (ref 12–16)
IMM GRANULOCYTES # BLD AUTO: 0.07 K/UL (ref 0–0.04)
IMM GRANULOCYTES NFR BLD AUTO: 0.4 % (ref 0–0.5)
LYMPHOCYTES # BLD AUTO: 2 K/UL (ref 1–4.8)
LYMPHOCYTES NFR BLD: 10.9 % (ref 18–48)
MAGNESIUM SERPL-MCNC: 2.1 MG/DL (ref 1.6–2.6)
MCH RBC QN AUTO: 30.2 PG (ref 27–31)
MCHC RBC AUTO-ENTMCNC: 32 G/DL (ref 32–36)
MCV RBC AUTO: 94 FL (ref 82–98)
MONOCYTES # BLD AUTO: 1.9 K/UL (ref 0.3–1)
MONOCYTES NFR BLD: 10.3 % (ref 4–15)
MRSA SCREEN BY PCR: POSITIVE
NEUTROPHILS # BLD AUTO: 14 K/UL (ref 1.8–7.7)
NEUTROPHILS NFR BLD: 77.4 % (ref 38–73)
NRBC BLD-RTO: 0 /100 WBC
PHOSPHATE SERPL-MCNC: 3.9 MG/DL (ref 2.7–4.5)
PLATELET # BLD AUTO: 201 K/UL (ref 150–450)
PMV BLD AUTO: 10.4 FL (ref 9.2–12.9)
POCT GLUCOSE: 112 MG/DL (ref 70–110)
POCT GLUCOSE: 112 MG/DL (ref 70–110)
POCT GLUCOSE: 77 MG/DL (ref 70–110)
POCT GLUCOSE: 92 MG/DL (ref 70–110)
POTASSIUM SERPL-SCNC: 3.5 MMOL/L (ref 3.5–5.1)
RBC # BLD AUTO: 4.37 M/UL (ref 4–5.4)
SODIUM SERPL-SCNC: 133 MMOL/L (ref 136–145)
WBC # BLD AUTO: 18.11 K/UL (ref 3.9–12.7)

## 2024-04-22 PROCEDURE — 63600175 PHARM REV CODE 636 W HCPCS: Performed by: INTERNAL MEDICINE

## 2024-04-22 PROCEDURE — 94640 AIRWAY INHALATION TREATMENT: CPT

## 2024-04-22 PROCEDURE — 97165 OT EVAL LOW COMPLEX 30 MIN: CPT

## 2024-04-22 PROCEDURE — 84100 ASSAY OF PHOSPHORUS: CPT

## 2024-04-22 PROCEDURE — 36415 COLL VENOUS BLD VENIPUNCTURE: CPT

## 2024-04-22 PROCEDURE — 99221 1ST HOSP IP/OBS SF/LOW 40: CPT | Mod: ,,, | Performed by: FAMILY MEDICINE

## 2024-04-22 PROCEDURE — 11000001 HC ACUTE MED/SURG PRIVATE ROOM

## 2024-04-22 PROCEDURE — 94761 N-INVAS EAR/PLS OXIMETRY MLT: CPT

## 2024-04-22 PROCEDURE — 87641 MR-STAPH DNA AMP PROBE: CPT | Performed by: INTERNAL MEDICINE

## 2024-04-22 PROCEDURE — 99223 1ST HOSP IP/OBS HIGH 75: CPT | Mod: ,,, | Performed by: SURGERY

## 2024-04-22 PROCEDURE — 83735 ASSAY OF MAGNESIUM: CPT

## 2024-04-22 PROCEDURE — 85025 COMPLETE CBC W/AUTO DIFF WBC: CPT

## 2024-04-22 PROCEDURE — 27000221 HC OXYGEN, UP TO 24 HOURS

## 2024-04-22 PROCEDURE — 25000003 PHARM REV CODE 250: Performed by: INTERNAL MEDICINE

## 2024-04-22 PROCEDURE — 99223 1ST HOSP IP/OBS HIGH 75: CPT | Mod: ,,, | Performed by: STUDENT IN AN ORGANIZED HEALTH CARE EDUCATION/TRAINING PROGRAM

## 2024-04-22 PROCEDURE — 25000242 PHARM REV CODE 250 ALT 637 W/ HCPCS

## 2024-04-22 PROCEDURE — 63600175 PHARM REV CODE 636 W HCPCS

## 2024-04-22 PROCEDURE — 25000003 PHARM REV CODE 250

## 2024-04-22 PROCEDURE — 97535 SELF CARE MNGMENT TRAINING: CPT

## 2024-04-22 PROCEDURE — 97161 PT EVAL LOW COMPLEX 20 MIN: CPT

## 2024-04-22 PROCEDURE — 80048 BASIC METABOLIC PNL TOTAL CA: CPT

## 2024-04-22 PROCEDURE — 25000003 PHARM REV CODE 250: Performed by: STUDENT IN AN ORGANIZED HEALTH CARE EDUCATION/TRAINING PROGRAM

## 2024-04-22 RX ORDER — CHLORHEXIDINE GLUCONATE ORAL RINSE 1.2 MG/ML
15 SOLUTION DENTAL 2 TIMES DAILY
Status: DISCONTINUED | OUTPATIENT
Start: 2024-04-22 | End: 2024-04-24 | Stop reason: HOSPADM

## 2024-04-22 RX ORDER — BUDESONIDE 0.25 MG/2ML
INHALANT ORAL
Qty: 120 ML | Refills: 5 | Status: SHIPPED | OUTPATIENT
Start: 2024-04-22 | End: 2024-06-11 | Stop reason: ALTCHOICE

## 2024-04-22 RX ADMIN — FUROSEMIDE 40 MG: 40 TABLET ORAL at 09:04

## 2024-04-22 RX ADMIN — LISINOPRIL 20 MG: 10 TABLET ORAL at 09:04

## 2024-04-22 RX ADMIN — ACETAMINOPHEN 650 MG: 325 TABLET ORAL at 03:04

## 2024-04-22 RX ADMIN — IPRATROPIUM BROMIDE AND ALBUTEROL SULFATE 3 ML: 2.5; .5 SOLUTION RESPIRATORY (INHALATION) at 01:04

## 2024-04-22 RX ADMIN — IPRATROPIUM BROMIDE AND ALBUTEROL SULFATE 3 ML: 2.5; .5 SOLUTION RESPIRATORY (INHALATION) at 08:04

## 2024-04-22 RX ADMIN — GABAPENTIN 200 MG: 100 CAPSULE ORAL at 09:04

## 2024-04-22 RX ADMIN — APIXABAN 5 MG: 2.5 TABLET, FILM COATED ORAL at 09:04

## 2024-04-22 RX ADMIN — POTASSIUM BICARBONATE 50 MEQ: 977.5 TABLET, EFFERVESCENT ORAL at 06:04

## 2024-04-22 RX ADMIN — MELATONIN TAB 3 MG 9 MG: 3 TAB at 09:04

## 2024-04-22 RX ADMIN — GABAPENTIN 200 MG: 100 CAPSULE ORAL at 03:04

## 2024-04-22 RX ADMIN — VANCOMYCIN HYDROCHLORIDE 2000 MG: 1 INJECTION, POWDER, LYOPHILIZED, FOR SOLUTION INTRAVENOUS at 03:04

## 2024-04-22 RX ADMIN — ATORVASTATIN CALCIUM 10 MG: 10 TABLET, FILM COATED ORAL at 09:04

## 2024-04-22 RX ADMIN — HYPROMELLOSE 2910 1 DROP: 5 SOLUTION/ DROPS OPHTHALMIC at 10:04

## 2024-04-22 RX ADMIN — ACETAMINOPHEN 650 MG: 325 TABLET ORAL at 09:04

## 2024-04-22 RX ADMIN — PIPERACILLIN AND TAZOBACTAM 4.5 G: 4; .5 INJECTION, POWDER, LYOPHILIZED, FOR SOLUTION INTRAVENOUS; PARENTERAL at 03:04

## 2024-04-22 RX ADMIN — BUDESONIDE 0.25 MG: 0.25 SUSPENSION RESPIRATORY (INHALATION) at 07:04

## 2024-04-22 RX ADMIN — SPIRONOLACTONE 25 MG: 25 TABLET ORAL at 03:04

## 2024-04-22 RX ADMIN — CHLORHEXIDINE GLUCONATE 0.12% ORAL RINSE 15 ML: 1.2 LIQUID ORAL at 09:04

## 2024-04-22 RX ADMIN — ACETAMINOPHEN 650 MG: 325 TABLET ORAL at 11:04

## 2024-04-22 RX ADMIN — CEFTRIAXONE SODIUM 1 G: 1 INJECTION, POWDER, FOR SOLUTION INTRAMUSCULAR; INTRAVENOUS at 02:04

## 2024-04-22 NOTE — PROGRESS NOTES
Pharmacokinetic Initial Assessment: IV Vancomycin    Assessment/Plan:    Initiate intravenous vancomycin with loading dose of 2000 mg once followed by a maintenance dose of vancomycin 1250mg IV every 12 hours  Desired empiric serum trough concentration is 15 to 20 mcg/mL  Draw vancomycin trough level 60 min prior to third dose on 4/23/24 at approximately 1330  Pharmacy will continue to follow and monitor vancomycin.      Please contact pharmacy at extension [phone] with any questions regarding this assessment.     Thank you for the consult,   Trista Teague       Patient brief summary:  Nelly Tian is a 73 y.o. female initiated on antimicrobial therapy with IV Vancomycin for treatment of suspected skin & soft tissue infection & Sepsis    Drug Allergies:   Review of patient's allergies indicates:   Allergen Reactions    Dilaudid [hydromorphone] Anaphylaxis     Other reaction(s): Anaphylaxis  Other reaction(s): Unknown    Zyvox [linezolid in dextrose 5%] Shortness Of Breath       Actual Body Weight:   158.8 kg    Renal Function:   Estimated Creatinine Clearance: 116.9 mL/min (based on SCr of 0.7 mg/dL).,     Dialysis Method (if applicable):  N/A    CBC (last 72 hours):  Recent Labs   Lab Result Units 04/21/24  0240 04/22/24  0421   WBC K/uL 13.92* 18.11*   Hemoglobin g/dL 15.1 13.2   Hematocrit % 48.4 41.2   Platelets K/uL 210 201   Gran % % 78.1* 77.4*   Lymph % % 9.6* 10.9*   Mono % % 8.5 10.3   Eosinophil % % 3.1 0.7   Basophil % % 0.3 0.3   Differential Method  Automated Automated       Metabolic Panel (last 72 hours):  Recent Labs   Lab Result Units 04/21/24  0240 04/21/24  0311 04/22/24  0421   Sodium mmol/L 136  --  133*   Potassium mmol/L 4.9  --  3.5   Chloride mmol/L 96  --  96   CO2 mmol/L 24  --  26   Glucose mg/dL 119*  --  107   Glucose, UA   --  4+*  --    BUN mg/dL 14  --  18   Creatinine mg/dL 0.7  --  0.7   Albumin g/dL 3.8  --   --    Total Bilirubin mg/dL 0.4  --   --    Alkaline Phosphatase  "U/L 100  --   --    AST U/L 26  --   --    ALT U/L 19  --   --    Magnesium mg/dL 1.8  --  2.1   Phosphorus mg/dL  --   --  3.9       Drug levels (last 3 results):  No results for input(s): "VANCOMYCINRA", "VANCORANDOM", "VANCOMYCINPE", "VANCOPEAK", "VANCOMYCINTR", "VANCOTROUGH" in the last 72 hours.    Microbiologic Results:  Microbiology Results (last 7 days)       Procedure Component Value Units Date/Time    MRSA Screen by PCR [[id number]]     Order Status: No result Specimen: Nasal Swab     Blood culture x two cultures. Draw prior to antibiotics. [[id number]] Collected: 04/21/24 0240    Order Status: Completed Specimen: Blood from Peripheral, Wrist, Left Updated: 04/22/24 1032     Blood Culture, Routine No Growth to date      No Growth to date    Narrative:      Aerobic and anaerobic    Blood culture x two cultures. Draw prior to antibiotics. [[id number]] Collected: 04/21/24 0240    Order Status: Completed Specimen: Blood from Peripheral, Antecubital, Right Updated: 04/22/24 1032     Blood Culture, Routine No Growth to date      No Growth to date    Narrative:      Aerobic and anaerobic    Influenza A & B by Molecular [[id number]] Collected: 04/21/24 0219    Order Status: Completed Specimen: Nasal Swab Updated: 04/21/24 0309     Influenza A, Molecular Negative     Influenza B, Molecular Negative     Flu A & B Source Nasal swab            "

## 2024-04-22 NOTE — ASSESSMENT & PLAN NOTE
Large, already improving overnight from marked area  Would continue antibiotics  No obvious need for surgery at this time unless abscess develops or infection worsens  Hernia can be addressed as outpatien

## 2024-04-22 NOTE — PT/OT/SLP EVAL
"Physical Therapy Evaluation    Patient Name:  Nelly Tian   MRN:  [id number]    Recommendations:     Discharge Recommendations: No Therapy Indicated (pt declines HHPT- house is crowded)   Discharge Equipment Recommendations: none   Barriers to discharge: None    Assessment:     Nelly Tian is a 73 y.o. female admitted with a medical diagnosis of Severe sepsis.  She presents with the following impairments/functional limitations: weakness, impaired endurance, impaired functional mobility, gait instability, impaired balance, decreased lower extremity function, pain, impaired skin, edema, impaired cardiopulmonary response to activity .    Pt seen supine in bed. Pt with protuberant abdomen and large hernia. Has dressing L side of belly. Pt stated was home with family and gets around the house. Pt O2 dependent. Pt ambulated with RW 50ft with fatigue. Back to bed. Declined OOB to chair- will have a hard time getting up from low chair. Pt will benefit from continued therapies while in hospital.    Rehab Prognosis: Fair; patient would benefit from acute skilled PT services to address these deficits and reach maximum level of function.    Recent Surgery: * No surgery found *      Plan:     During this hospitalization, patient to be seen 6 x/week to address the identified rehab impairments via gait training, therapeutic activities, therapeutic exercises and progress toward the following goals:    Plan of Care Expires:  05/30/24    Subjective   Stated  " I need to get better"  Chief Complaint: back hurts  Patient/Family Comments/goals: get well  Pain/Comfort:  Pain Rating 1:  (not rated)  Location 1: back  Pain Addressed 1: Reposition, Distraction, Cessation of Activity    Patients cultural, spiritual, Jainism conflicts given the current situation:      Living Environment:  Home with daughter and family  Prior to admission, patients level of function was ambulatory household with rollator.  Equipment used " at home: rollator, oxygen.  DME owned (not currently used): none.  Upon discharge, patient will have assistance from family.    Objective:     Communicated with nurse Layton prior to session.  Patient found HOB elevated with telemetry, bed alarm, oxygen  upon PT entry to room.    General Precautions: Standard, fall, anti-coagulation medicine  Orthopedic Precautions:N/A   Braces: N/A  Respiratory Status: Nasal cannula, flow 3 L/min    Exams:  Postural Exam:  Patient presented with the following abnormalities:    -       Rounded shoulders  -       Forward head  -       BMI 53.22  RLE ROM: WFL  RLE Strength: WFL  LLE ROM: WFL  LLE Strength: WFL    Functional Mobility:  Bed Mobility:     Rolling Right: modified independence  Scooting: contact guard assistance  Supine to Sit: contact guard assistance  Sit to Supine: contact guard assistance  Transfers:     Sit to Stand:  contact guard assistance with rolling walker  Gait: 50ft with RW min assist      AM-PAC 6 CLICK MOBILITY  Total Score:17       Treatment & Education:  Patient was educated on the importance of OOB activity and functional mobility to negate negative effects of prolonged bed rest during hospitalization, safe transfers and ambulation, and D/C planning   Back to bed post PT  Encouraged ankle pumping exercises    Patient left HOB elevated with all lines intact, call button in reach, bed alarm on, and nurse Layton notified.    GOALS:   Multidisciplinary Problems       Physical Therapy Goals          Problem: Physical Therapy    Goal Priority Disciplines Outcome Goal Variances Interventions   Physical Therapy Goal     PT, PT/OT Ongoing, Progressing     Description: Goals to be met by: 2024     Patient will increase functional independence with mobility by performin. Supine to sit with Modified [place]  2. Sit to stand transfer with Modified [place]  3. Bed to chair transfer with Modified [place] using Rolling Walker  4. Gait  x 150  feet with Contact Guard Assistance using Rolling Walker.   5. Lower extremity exercise program x20 reps                        History:     Past Medical History:   Diagnosis Date    *Atrial flutter     Angina pectoris 2017    Anxiety     Arthritis     Asthma     Atrial fibrillation     Back pain     Cataract     OD    Chest pain 2017    CHF (congestive heart failure)     Chronically on benzodiazepine therapy 2019    COPD (chronic obstructive pulmonary disease)     Depression     Diabetes mellitus     Emphysema of lung     Heart failure     Hepatomegaly 2/3/2016    Hernia     History of MI (myocardial infarction) 2016    Hypercapnic respiratory failure, chronic 2016    Hyperlipidemia     Hypertension     Iron deficiency anemia 2/3/2016    Myocardial infarction     Obesity     Peripheral vascular disease     Pneumonia     Polyneuropathy     Retinal detachment     OS    Septic shock 2017    Skin ulcer 3/18/2017    Tobacco dependence     Type II 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, not stated as uncontrolled(250.60)        Past Surgical History:   Procedure Laterality Date    BREAST BIOPSY Left 10 yrs ago    benign    CATARACT EXTRACTION Left     OS     CATARACT EXTRACTION W/  INTRAOCULAR LENS IMPLANT Right 2023    Procedure: CEIOL OD;  Surgeon: David Bautista MD;  Location: [facility];  Service: Ophthalmology;  Laterality: Right;     SECTION      x2    EYE SURGERY      HYSTERECTOMY      partial    OOPHORECTOMY      one ovary conserved    RETINAL DETACHMENT SURGERY      buckle --OS    TONSILLECTOMY         Time Tracking:     PT Received On: 24  PT Start Time: 913     PT Stop Time: 928  PT Total Time (min): 15 min     Billable Minutes: Evaluation 15      2024

## 2024-04-22 NOTE — ASSESSMENT & PLAN NOTE
Hypertension which is uncontrolled.  Latest blood pressure and vitals reviewed-   Temp:  [97.2 °F (36.2 °C)-98.1 °F (36.7 °C)]   Pulse:  [64-79]   Resp:  [17-20]   BP: ()/(45-70)   SpO2:  [93 %-97 %] .   Patient currently off IV antihypertensives.   Home meds for hypertension were reviewed and noted below.   Hypertension Medications               furosemide (LASIX) 40 MG tablet Take 1 tablet (40 mg total) by mouth once daily.    lisinopriL (PRINIVIL,ZESTRIL) 20 MG tablet Take 1 tablet (20 mg total) by mouth once daily.    spironolactone (ALDACTONE) 25 MG tablet Take 1 tablet (25 mg total) by mouth 3 (three) times a week.                Will goal for controlled BP reduction as noted be medications noted above and monitor and mitigate end organ damage as indicated.

## 2024-04-22 NOTE — SUBJECTIVE & OBJECTIVE
Past Medical History:   Diagnosis Date    *Atrial flutter     Angina pectoris 2017    Anxiety     Arthritis     Asthma     Atrial fibrillation     Back pain     Cataract     OD    Chest pain 2017    CHF (congestive heart failure)     Chronically on benzodiazepine therapy 2019    COPD (chronic obstructive pulmonary disease)     Depression     Diabetes mellitus     Emphysema of lung     Heart failure     Hepatomegaly 2/3/2016    Hernia     History of MI (myocardial infarction) 2016    Hypercapnic respiratory failure, chronic 2016    Hyperlipidemia     Hypertension     Iron deficiency anemia 2/3/2016    Myocardial infarction     Obesity     Peripheral vascular disease     Pneumonia     Polyneuropathy     Retinal detachment     OS    Septic shock 2017    Skin ulcer 3/18/2017    Tobacco dependence     Type II 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, not stated as uncontrolled(250.60)        Past Surgical History:   Procedure Laterality Date    BREAST BIOPSY Left 10 yrs ago    benign    CATARACT EXTRACTION Left     OS     CATARACT EXTRACTION W/  INTRAOCULAR LENS IMPLANT Right 2023    Procedure: CEIOL OD;  Surgeon: David Bautista MD;  Location: [facility];  Service: Ophthalmology;  Laterality: Right;     SECTION      x2    EYE SURGERY      HYSTERECTOMY      partial    OOPHORECTOMY      one ovary conserved    RETINAL DETACHMENT SURGERY      buckle --OS    TONSILLECTOMY         Review of patient's allergies indicates:   Allergen Reactions    Dilaudid [hydromorphone] Anaphylaxis     Other reaction(s): Anaphylaxis  Other reaction(s): Unknown    Zyvox [linezolid in dextrose 5%] Shortness Of Breath       Current Facility-Administered Medications   Medication Dose Route Frequency Provider Last Rate Last Admin    acetaminophen tablet 650 mg  650 mg Oral Q4H PRN Evelyn Townsend DNP   650 mg at 24 1142    albuterol-ipratropium 2.5 mg-0.5 mg/3 mL  nebulizer solution 3 mL  3 mL Nebulization Q6H WAKE Evelyn Townsend DNP   3 mL at 04/22/24 1353    aluminum-magnesium hydroxide-simethicone 200-200-20 mg/5 mL suspension 30 mL  30 mL Oral QID PRN Evelyn Townsend DNP        apixaban tablet 5 mg  5 mg Oral BID FouchEvelyn hernández DNP   5 mg at 04/22/24 0905    artificial tears 0.5 % ophthalmic solution 1 drop  1 drop Both Eyes TID PRN Garrett Mcneal MD        atorvastatin tablet 10 mg  10 mg Oral Daily FouchEvelyn hernández, DNP   10 mg at 04/22/24 0905    budesonide nebulizer solution 0.25 mg  0.25 mg Nebulization Daily Evelyn Townsend DNP   0.25 mg at 04/22/24 0752    dextrose 10% bolus 125 mL 125 mL  12.5 g Intravenous PRN Evelyn Townsend DNP        dextrose 10% bolus 250 mL 250 mL  25 g Intravenous PRN Evelyn Townsend DNP        furosemide tablet 40 mg  40 mg Oral Daily FouchEvelyn hernández DNP   40 mg at 04/22/24 0905    gabapentin capsule 200 mg  200 mg Oral TID Evelyn Townsend DNP   200 mg at 04/22/24 1508    glucagon (human recombinant) injection 1 mg  1 mg Intramuscular PRN Evelyn Townsend DNP        glucose chewable tablet 16 g  16 g Oral PRN Evelyn Townsend DNP        glucose chewable tablet 24 g  24 g Oral PRN Evelyn Townsend, DNP        insulin aspart U-100 pen 0-5 Units  0-5 Units Subcutaneous QID (AC + HS) PRN Evelyn Townsend DNP        lisinopriL tablet 20 mg  20 mg Oral Daily FouchEvelyn hernández DNP   20 mg at 04/22/24 0905    magnesium oxide tablet 800 mg  800 mg Oral PRN Evelyn Townsend DNP        magnesium oxide tablet 800 mg  800 mg Oral PRN Evelyn Townsend DNP        melatonin tablet 9 mg  9 mg Oral Nightly PRN Evelyn Townsend DNP   9 mg at 04/21/24 2039    morphine injection 2 mg  2 mg Intravenous Q4H PRN Evelyn Townsend DNP        naloxone 0.4 mg/mL injection 0.02 mg  0.02 mg Intravenous PRN Evelyn Townsend DNP        ondansetron injection 4 mg  4 mg Intravenous Q6H PRN Evelyn Townsend DNP        ondansetron injection 4 mg  4 mg Intravenous  Q6H PRN Evelyn Townsend DNP        potassium bicarbonate disintegrating tablet 35 mEq  35 mEq Oral PRN Evelyn Townsend DNP        potassium bicarbonate disintegrating tablet 50 mEq  50 mEq Oral PRN Evelyn Townsend DNP   50 mEq at 24 0624    potassium bicarbonate disintegrating tablet 60 mEq  60 mEq Oral PRN Evelyn Townsend DNP        senna-docusate 8.6-50 mg per tablet 1 tablet  1 tablet Oral BID PRN Evelyn Townsend DNP        sodium chloride 0.9% flush 10 mL  10 mL Intravenous Q12H PRN Evelyn Townsend DNP        spironolactone tablet 25 mg  25 mg Oral Once per day on  Evelyn Townsend DNP   25 mg at 24 1512    vancomycin - pharmacy to dose   Intravenous pharmacy to manage frequency Garrett Mcneal MD        [START ON 2024] vancomycin 1,250 mg in dextrose 5 % (D5W) 250 mL IVPB (Vial-Mate)  1,250 mg Intravenous Q12H Garrett Mcneal MD         Family History       Problem Relation (Age of Onset)    Alcohol abuse Mother    Arrhythmia Father    Arthritis Father, Sister    Blindness Son    Cancer Brother    Cirrhosis Mother    Coronary artery disease Father, Sister    Crohn's disease Sister    Early death Sister (30)    Heart attack Father, Sister    Heart disease Father, Sister (32), Sister    Lung cancer Brother    No Known Problems Brother, Daughter          Tobacco Use    Smoking status: Former     Current packs/day: 0.00     Average packs/day: 0.3 packs/day for 54.4 years (16.3 ttl pk-yrs)     Types: Cigarettes     Start date: 1968     Quit date: 2022     Years since quittin.8    Smokeless tobacco: Never   Substance and Sexual Activity    Alcohol use: No     Alcohol/week: 0.0 standard drinks of alcohol    Drug use: No    Sexual activity: Not Currently     Interval History:  Patient reported abdominal redness little bit better than yesterday, denied any new symptoms.  Patient was evaluated by surgeon earlier today who advised conservative management.   Antibiotics changed to IV vancomycin, nasal MRSA ordered.  Will get repeat procalcitonin tomorrow am.  ID was consulted by surgery.      Review of Systems   Constitutional:  Negative for activity change, appetite change, chills, diaphoresis, fatigue and fever.   Respiratory:  Negative for cough, shortness of breath and wheezing.    Cardiovascular:  Negative for chest pain and leg swelling.   Gastrointestinal:  Negative for abdominal pain, constipation, nausea and vomiting.   Genitourinary:  Negative for difficulty urinating.   Skin:         Redness lower abdomen   Neurological:  Negative for dizziness, light-headedness and headaches.     Objective:     Vital Signs (Most Recent):  Temp: 97.5 °F (36.4 °C) (04/22/24 1629)  Pulse: 69 (04/22/24 1629)  Resp: 18 (04/22/24 1629)  BP: (!) 107/49 (04/22/24 1629)  SpO2: 95 % (04/22/24 1356) Vital Signs (24h Range):  Temp:  [97.2 °F (36.2 °C)-98.1 °F (36.7 °C)] 97.5 °F (36.4 °C)  Pulse:  [64-79] 69  Resp:  [17-20] 18  SpO2:  [93 %-97 %] 95 %  BP: ()/(45-70) 107/49     Weight: (!) 158.8 kg (350 lb)  Body mass index is 53.22 kg/m².     Physical Exam  Vitals and nursing note reviewed.   Constitutional:       Appearance: She is obese. She is ill-appearing.   Cardiovascular:      Pulses: Normal pulses.   Pulmonary:      Effort: Pulmonary effort is normal. No respiratory distress.      Breath sounds: Normal breath sounds. No wheezing.   Abdominal:      General: Bowel sounds are normal. There is distension.      Tenderness: There is abdominal tenderness (LLQ) in the left lower quadrant.      Comments: Large obese abdomen with hernia   Musculoskeletal:      Right lower leg: No edema.      Left lower leg: No edema.   Skin:     General: Skin is warm.      Capillary Refill: Capillary refill takes less than 2 seconds.      Findings: Erythema and wound (LLQ dressing cdi) present.      Comments: LLQ abdominal wound; possible cellulitis. Dsg CDI.   Neurological:      Mental Status: She  is alert and oriented to person, place, and time. Mental status is at baseline.                Significant Labs: All pertinent labs within the past 24 hours have been reviewed.  A1C:   Recent Labs   Lab 24  0145   HGBA1C 6.0     ABGs:   Recent Labs   Lab 24  0230   PH 7.484*   PCO2 37.2   HCO3 28.0   POCSATURATED 95   BE 5*   PO2 68*     BMP:   Recent Labs   Lab 24  042      *   K 3.5   CL 96   CO2 26   BUN 18   CREATININE 0.7   CALCIUM 9.6   MG 2.1     CBC:   Recent Labs   Lab 24  0240 24  0421   WBC 13.92* 18.11*   HGB 15.1 13.2   HCT 48.4 41.2    201     CMP:   Recent Labs   Lab 24  0240 24  0421    133*   K 4.9 3.5   CL 96 96   CO2 24 26   * 107   BUN 14 18   CREATININE 0.7 0.7   CALCIUM 10.4 9.6   PROT 7.7  --    ALBUMIN 3.8  --    BILITOT 0.4  --    ALKPHOS 100  --    AST 26  --    ALT 19  --    ANIONGAP 16 11     Cardiac Markers:   Recent Labs   Lab 24  0240   *     Coagulation:   Recent Labs   Lab 24  0240   INR 1.0   APTT 23.7     Lactic Acid:   Recent Labs   Lab 24  0609   LACTATE 2.0     Lipase:   Recent Labs   Lab 24  0240   LIPASE 32       Magnesium:   Recent Labs   Lab 24  0240 24  0421   MG 1.8 2.1       Troponin:   Recent Labs   Lab 24  0240   TROPONINI 0.022     Urine Studies:   Recent Labs   Lab 24  0311   COLORU Yellow   APPEARANCEUA Clear   PHUR 7.0   SPECGRAV 1.015   PROTEINUA Negative   GLUCUA 4+*   KETONESU Negative   BILIRUBINUA Negative   OCCULTUA Negative   NITRITE Negative   UROBILINOGEN Negative   LEUKOCYTESUR Trace*   RBCUA 2   WBCUA 6*   BACTERIA Rare   SQUAMEPITHEL 2       Significant Imaging: I have reviewed all pertinent imaging results/findings within the past 24 hours.    Patient Name: NYLA GIBBS MRN: [id number]  (Age): 1950 (73) Gender: F Date of Exam: 2024 Accession: [id number] Referring Physician: DEE JAMES # of Images: 1  Ordered As:   XR CHEST 1 VIEW FRONTAL   24/7/365 Support  [phone]  access.[employer] CONFIDENTIALITY STATEMENT This report is intended only for use by the referring physician, and only in accordance with law. If you received this in error, call [phone]. Page 1 of 1 PROCEDURE INFORMATION: Exam: XR Chest Exam date and time: 4/21/2024 2:35 AM Age: 73 years old Clinical indication: Shortness of breath; Patient HX: SOB TECHNIQUE: Imaging protocol: Radiologic exam of the chest. Views: 1 view. COMPARISON: DX XR CHEST PA AND LATERAL 2/23/2024 12:14 PM FINDINGS: Lungs: Trace pulmonary vascular congestion. Pleural spaces: Unremarkable. No pleural effusion. No pneumothorax. Heart/Mediastinum: There is mild to moderate cardiomegaly. Bones/joints: Unremarkable. IMPRESSION: Trace pulmonary vascular congestion. Dictated and Authenticated by: Domenico Rodriguez MD 04/21/2024 3:15 AM Central Time ([place] & [place])

## 2024-04-22 NOTE — PLAN OF CARE
Problem: Physical Therapy  Goal: Physical Therapy Goal  Description: Goals to be met by: 2024     Patient will increase functional independence with mobility by performin. Supine to sit with Modified [place]  2. Sit to stand transfer with Modified [place]  3. Bed to chair transfer with Modified [place] using Rolling Walker  4. Gait  x 150 feet with Contact Guard Assistance using Rolling Walker.   5. Lower extremity exercise program x20 reps   Outcome: Ongoing, Progressing   PT eval and treat. Gait with RW min assist 50ft. home

## 2024-04-22 NOTE — PROGRESS NOTES
Critical access hospital Medicine  Progress Note    Patient Name: Nelly Tian  MRN: [id number]  Patient Class: IP- Inpatient   Admission Date: 4/21/2024  Length of Stay: 1 days  Attending Physician: Garrett Mcneal MD  Primary Care Provider: Constantine Bird MD        Subjective:     Principal Problem:Severe sepsis        HPI:  Nelly Tian is a 73 year old female with past medical history of AFib/a flutter on Eliquis, CHF, COPD, diabetes mellitus, recurrent abdominal cellulitis, hypertension, iron-deficiency anemia, hyperlipidemia, MI, anxiety, and arthritis who presents with complaints of left lower abdominal wound with orange/yellowish drainage for over a week. She reports developing the wound after scratching her lower abdomen and her daughter has been performing the wound care with dressing changes. She denies chest pain, shortness of breath, fever, chills, dizziness, lightheadedness, abdominal pain, nausea or vomiting. She presents septic in ED with a T-max 100.1, tachypneic, tachycardic, WBC 13.9, lactic acid 1.91, and urinalysis positive WBCs/leukocytes/bacteria culture pending.  Chest x-ray significant for trace pulmonary vascular congestion. She was started on Rocephin and Zosyn.  Wound care consult placed.  Admitted to hospital medicine for further management and treatment.         Overview/Hospital Course:  No notes on file    Past Medical History:   Diagnosis Date    *Atrial flutter     Angina pectoris 9/18/2017    Anxiety     Arthritis     Asthma     Atrial fibrillation     Back pain     Cataract     OD    Chest pain 9/17/2017    CHF (congestive heart failure)     Chronically on benzodiazepine therapy 5/4/2019    COPD (chronic obstructive pulmonary disease)     Depression     Diabetes mellitus     Emphysema of lung     Heart failure     Hepatomegaly 2/3/2016    Hernia     History of MI (myocardial infarction) 1/19/2016    Hypercapnic respiratory failure, chronic 11/16/2016     Hyperlipidemia     Hypertension     Iron deficiency anemia 2/3/2016    Myocardial infarction     Obesity     Peripheral vascular disease     Pneumonia     Polyneuropathy     Retinal detachment     OS    Septic shock 2017    Skin ulcer 3/18/2017    Tobacco dependence     Type II 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, not stated as uncontrolled(250.60)        Past Surgical History:   Procedure Laterality Date    BREAST BIOPSY Left 10 yrs ago    benign    CATARACT EXTRACTION Left     OS     CATARACT EXTRACTION W/  INTRAOCULAR LENS IMPLANT Right 2023    Procedure: CEIOL OD;  Surgeon: David Bautista MD;  Location: [facility] OR;  Service: Ophthalmology;  Laterality: Right;     SECTION      x2    EYE SURGERY      HYSTERECTOMY      partial    OOPHORECTOMY      one ovary conserved    RETINAL DETACHMENT SURGERY      buckle --OS    TONSILLECTOMY         Review of patient's allergies indicates:   Allergen Reactions    Dilaudid [hydromorphone] Anaphylaxis     Other reaction(s): Anaphylaxis  Other reaction(s): Unknown    Zyvox [linezolid in dextrose 5%] Shortness Of Breath       Current Facility-Administered Medications   Medication Dose Route Frequency Provider Last Rate Last Admin    acetaminophen tablet 650 mg  650 mg Oral Q4H PRN Evelyn Townsend DNP   650 mg at 24 1142    albuterol-ipratropium 2.5 mg-0.5 mg/3 mL nebulizer solution 3 mL  3 mL Nebulization Q6H WAKE Evelyn Townsend DNP   3 mL at 24 1353    aluminum-magnesium hydroxide-simethicone 200-200-20 mg/5 mL suspension 30 mL  30 mL Oral QID PRN Evelyn Townsend DNP        apixaban tablet 5 mg  5 mg Oral BID Evelyn Townsend DNP   5 mg at 24 0905    artificial tears 0.5 % ophthalmic solution 1 drop  1 drop Both Eyes TID PRN Garrett Mcneal MD        atorvastatin tablet 10 mg  10 mg Oral Daily Evelyn Townsend DNP   10 mg at 24 0905    budesonide nebulizer solution 0.25 mg  0.25 mg  Nebulization Daily Evelyn Townsend DNP   0.25 mg at 04/22/24 0752    dextrose 10% bolus 125 mL 125 mL  12.5 g Intravenous PRN Evelyn Townsend, DNP        dextrose 10% bolus 250 mL 250 mL  25 g Intravenous PRN Karl Townsenda, DNP        furosemide tablet 40 mg  40 mg Oral Daily FouchKarl hernándeza, DNP   40 mg at 04/22/24 0905    gabapentin capsule 200 mg  200 mg Oral TID Evelyn Townsend DNP   200 mg at 04/22/24 1508    glucagon (human recombinant) injection 1 mg  1 mg Intramuscular PRN Evelyn Townsend, SAMUEL        glucose chewable tablet 16 g  16 g Oral PRN Evelyn Townsend, SAMUEL        glucose chewable tablet 24 g  24 g Oral PRN Evelyn Townsend, SAMUEL        insulin aspart U-100 pen 0-5 Units  0-5 Units Subcutaneous QID (AC + HS) PRN Evelyn Townsend, DNP        lisinopriL tablet 20 mg  20 mg Oral Daily Evelyn Townsend, DNP   20 mg at 04/22/24 0905    magnesium oxide tablet 800 mg  800 mg Oral PRN Evelyn Townsend, DNP        magnesium oxide tablet 800 mg  800 mg Oral PRN Evelyn Townsend, SAMUEL        melatonin tablet 9 mg  9 mg Oral Nightly PRN Evelyn Townsend DNP   9 mg at 04/21/24 2039    morphine injection 2 mg  2 mg Intravenous Q4H PRN Evelyn Townsend DNP        naloxone 0.4 mg/mL injection 0.02 mg  0.02 mg Intravenous PRN Evelyn Townsend, SAMUEL        ondansetron injection 4 mg  4 mg Intravenous Q6H PRN Karl Townsenda, SAMUEL        ondansetron injection 4 mg  4 mg Intravenous Q6H PRN Karl Townsenda, DNP        potassium bicarbonate disintegrating tablet 35 mEq  35 mEq Oral PRN Karl Townsenda, DNP        potassium bicarbonate disintegrating tablet 50 mEq  50 mEq Oral PRN Karl Townsenda, DNP   50 mEq at 04/22/24 0624    potassium bicarbonate disintegrating tablet 60 mEq  60 mEq Oral PRN Evelyn Townsend, DNP        senna-docusate 8.6-50 mg per tablet 1 tablet  1 tablet Oral BID PRN Evelyn Townsend DNP        sodium chloride 0.9% flush 10 mL  10 mL Intravenous Q12H PRN Evelyn Townsend, SAMUEL        spironolactone tablet  25 mg  25 mg Oral Once per day on  Evelyn Townsend, DNP   25 mg at 24 1512    vancomycin - pharmacy to dose   Intravenous pharmacy to manage frequency Garrett Mcneal MD        [START ON 2024] vancomycin 1,250 mg in dextrose 5 % (D5W) 250 mL IVPB (Vial-Mate)  1,250 mg Intravenous Q12H Garrett Mcneal MD         Family History       Problem Relation (Age of Onset)    Alcohol abuse Mother    Arrhythmia Father    Arthritis Father, Sister    Blindness Son    Cancer Brother    Cirrhosis Mother    Coronary artery disease Father, Sister    Crohn's disease Sister    Early death Sister (30)    Heart attack Father, Sister    Heart disease Father, Sister (32), Sister    Lung cancer Brother    No Known Problems Brother, Daughter          Tobacco Use    Smoking status: Former     Current packs/day: 0.00     Average packs/day: 0.3 packs/day for 54.4 years (16.3 ttl pk-yrs)     Types: Cigarettes     Start date: 1968     Quit date: 2022     Years since quittin.8    Smokeless tobacco: Never   Substance and Sexual Activity    Alcohol use: No     Alcohol/week: 0.0 standard drinks of alcohol    Drug use: No    Sexual activity: Not Currently     Interval History:  Patient reported abdominal redness little bit better than yesterday, denied any new symptoms.  Patient was evaluated by surgeon earlier today who advised conservative management.  Antibiotics changed to IV vancomycin, nasal MRSA ordered.  Will get repeat procalcitonin tomorrow am.  ID was consulted by surgery.      Review of Systems   Constitutional:  Negative for activity change, appetite change, chills, diaphoresis, fatigue and fever.   Respiratory:  Negative for cough, shortness of breath and wheezing.    Cardiovascular:  Negative for chest pain and leg swelling.   Gastrointestinal:  Negative for abdominal pain, constipation, nausea and vomiting.   Genitourinary:  Negative for difficulty urinating.   Skin:          Redness lower abdomen   Neurological:  Negative for dizziness, light-headedness and headaches.     Objective:     Vital Signs (Most Recent):  Temp: 97.5 °F (36.4 °C) (04/22/24 1629)  Pulse: 69 (04/22/24 1629)  Resp: 18 (04/22/24 1629)  BP: (!) 107/49 (04/22/24 1629)  SpO2: 95 % (04/22/24 1356) Vital Signs (24h Range):  Temp:  [97.2 °F (36.2 °C)-98.1 °F (36.7 °C)] 97.5 °F (36.4 °C)  Pulse:  [64-79] 69  Resp:  [17-20] 18  SpO2:  [93 %-97 %] 95 %  BP: ()/(45-70) 107/49     Weight: (!) 158.8 kg (350 lb)  Body mass index is 53.22 kg/m².     Physical Exam  Vitals and nursing note reviewed.   Constitutional:       Appearance: She is obese. She is ill-appearing.   Cardiovascular:      Pulses: Normal pulses.   Pulmonary:      Effort: Pulmonary effort is normal. No respiratory distress.      Breath sounds: Normal breath sounds. No wheezing.   Abdominal:      General: Bowel sounds are normal. There is distension.      Tenderness: There is abdominal tenderness (LLQ) in the left lower quadrant.      Comments: Large obese abdomen with hernia   Musculoskeletal:      Right lower leg: No edema.      Left lower leg: No edema.   Skin:     General: Skin is warm.      Capillary Refill: Capillary refill takes less than 2 seconds.      Findings: Erythema and wound (LLQ dressing cdi) present.      Comments: LLQ abdominal wound; possible cellulitis. Dsg CDI.   Neurological:      Mental Status: She is alert and oriented to person, place, and time. Mental status is at baseline.                Significant Labs: All pertinent labs within the past 24 hours have been reviewed.  A1C:   Recent Labs   Lab 02/19/24  0145   HGBA1C 6.0     ABGs:   Recent Labs   Lab 04/21/24  0230   PH 7.484*   PCO2 37.2   HCO3 28.0   POCSATURATED 95   BE 5*   PO2 68*     BMP:   Recent Labs   Lab 04/22/24  0421      *   K 3.5   CL 96   CO2 26   BUN 18   CREATININE 0.7   CALCIUM 9.6   MG 2.1     CBC:   Recent Labs   Lab 04/21/24  0240 04/22/24  7197    WBC 13.92* 18.11*   HGB 15.1 13.2   HCT 48.4 41.2    201     CMP:   Recent Labs   Lab 24  0240 24  0421    133*   K 4.9 3.5   CL 96 96   CO2 24 26   * 107   BUN 14 18   CREATININE 0.7 0.7   CALCIUM 10.4 9.6   PROT 7.7  --    ALBUMIN 3.8  --    BILITOT 0.4  --    ALKPHOS 100  --    AST 26  --    ALT 19  --    ANIONGAP 16 11     Cardiac Markers:   Recent Labs   Lab 24  0240   *     Coagulation:   Recent Labs   Lab 24  0240   INR 1.0   APTT 23.7     Lactic Acid:   Recent Labs   Lab 24  0609   LACTATE 2.0     Lipase:   Recent Labs   Lab 24  0240   LIPASE 32       Magnesium:   Recent Labs   Lab 24  0240 24  0421   MG 1.8 2.1       Troponin:   Recent Labs   Lab 24  0240   TROPONINI 0.022     Urine Studies:   Recent Labs   Lab 24  0311   COLORU Yellow   APPEARANCEUA Clear   PHUR 7.0   SPECGRAV 1.015   PROTEINUA Negative   GLUCUA 4+*   KETONESU Negative   BILIRUBINUA Negative   OCCULTUA Negative   NITRITE Negative   UROBILINOGEN Negative   LEUKOCYTESUR Trace*   RBCUA 2   WBCUA 6*   BACTERIA Rare   SQUAMEPITHEL 2       Significant Imaging: I have reviewed all pertinent imaging results/findings within the past 24 hours.    Patient Name: NYLA GIBBS MRN: [id number]  (Age): 1950 (73) Gender: F Date of Exam: 2024 Accession: [id number] Referring Physician: DEE JAMES # of Images: 1 Ordered As:   XR CHEST 1 VIEW FRONTAL    Support  [phone]  [email or web address] CONFIDENTIALITY STATEMENT This report is intended only for use by the referring physician, and only in accordance with law. If you received this in error, call [phone]. Page 1 of 1 PROCEDURE INFORMATION: Exam: XR Chest Exam date and time: 2024 2:35 AM Age: 73 years old Clinical indication: Shortness of breath; Patient HX: SOB TECHNIQUE: Imaging protocol: Radiologic exam of the chest. Views: 1 view. COMPARISON: DX XR CHEST PA AND LATERAL 2024  "12:14 PM FINDINGS: Lungs: Trace pulmonary vascular congestion. Pleural spaces: Unremarkable. No pleural effusion. No pneumothorax. Heart/Mediastinum: There is mild to moderate cardiomegaly. Bones/joints: Unremarkable. IMPRESSION: Trace pulmonary vascular congestion. Dictated and Authenticated by: Domenico Rodriguez MD 04/21/2024 3:15 AM Central Time ([place] & [place])    Assessment/Plan:      * Severe sepsis  This patient does have evidence of infective focus  My overall impression is sepsis.  Source: Urinary Tract and Skin and Soft Tissue (location LLQ)  Antibiotics given-   Antibiotics (72h ago, onward)      Start     Stop Route Frequency Ordered    04/23/24 0230  vancomycin 1,250 mg in dextrose 5 % (D5W) 250 mL IVPB (Vial-Mate)         -- IV Every 12 hours (non-standard times) 04/22/24 1306    04/22/24 1350  vancomycin - pharmacy to dose  (vancomycin IVPB (PEDS and ADULTS))        Placed in "And" Linked Group    -- IV pharmacy to manage frequency 04/22/24 1251          Latest lactate reviewed-  Recent Labs   Lab 04/21/24  0230 04/21/24  0609   LACTATE  --  2.0   POCLAC 1.91*  --            Fluid challenge Contraindicated- Fluid bolus is contraindicated in this patient due to Congestive Heart Failure     Post- resuscitation assessment No - Post resuscitation assessment not needed     Source control achieved by: antibiotics    Surgery consulted   Patient was evaluated by surgeon earlier today who advised conservative management.    Antibiotics changed to IV vancomycin, nasal MRSA ordered.    Will get repeat procalcitonin tomorrow am.  ID was consulted by surgery.        Cystitis  See sepsis      Abdominal wall cellulitis  -See sepsis  -wound care consult      Chronic obstructive pulmonary disease (COPD)  Patient's COPD is controlled currently.  Patient is currently off COPD Pathway. Continue scheduled inhalers Antibiotics and Supplemental oxygen and monitor respiratory status closely.     Type 2 diabetes mellitus with " diabetic neuropathy, without long-term current use of insulin  Patient's FSGs are uncontrolled due to hyperglycemia on current medication regimen.  Last A1c reviewed-   Lab Results   Component Value Date    HGBA1C 6.0 02/19/2024     Most recent fingerstick glucose reviewed-   Recent Labs   Lab 04/21/24 2020 04/22/24  0749 04/22/24  1133   POCTGLUCOSE 81 112* 92     Current correctional scale  Low  Maintain anti-hyperglycemic dose as follows-   Antihyperglycemics (From admission, onward)      Start     Stop Route Frequency Ordered    04/21/24 0609  insulin aspart U-100 pen 0-5 Units         -- SubQ Before meals & nightly PRN 04/21/24 0512          Hold Oral hypoglycemics while patient is in the hospital.        Hypertension associated with diabetes  Hypertension which is uncontrolled.  Latest blood pressure and vitals reviewed-   Temp:  [97.2 °F (36.2 °C)-98.1 °F (36.7 °C)]   Pulse:  [64-79]   Resp:  [17-20]   BP: ()/(45-70)   SpO2:  [93 %-97 %] .   Patient currently off IV antihypertensives.   Home meds for hypertension were reviewed and noted below.   Hypertension Medications               furosemide (LASIX) 40 MG tablet Take 1 tablet (40 mg total) by mouth once daily.    lisinopriL (PRINIVIL,ZESTRIL) 20 MG tablet Take 1 tablet (20 mg total) by mouth once daily.    spironolactone (ALDACTONE) 25 MG tablet Take 1 tablet (25 mg total) by mouth 3 (three) times a week.                Will goal for controlled BP reduction as noted be medications noted above and monitor and mitigate end organ damage as indicated.        Chronic atrial fibrillation  Patient with Permanent atrial fibrillation which is uncontrolled currently with Beta Blocker. Patient is currently in atrial fibrillation.[id number] Score: 4.  Anticoagulation indicated. Anticoagulation done with Eliquis .      VTE Risk Mitigation (From admission, onward)           Ordered     apixaban tablet 5 mg  2 times daily         04/21/24 0512     IP VTE HIGH RISK  PATIENT  Once         04/21/24 0512     Place sequential compression device  Until discontinued         04/21/24 0512                    Discharge Planning   KRISTOPHER: 4/23/2024     Code Status: Full Code   Is the patient medically ready for discharge?:     Reason for patient still in hospital (select all that apply): Treatment  Discharge Plan A: Home Health                  Garrett Mcneal MD  Department of Hospital Medicine   [facility]/Surg

## 2024-04-22 NOTE — ASSESSMENT & PLAN NOTE
"This patient does have evidence of infective focus  My overall impression is sepsis.  Source: Urinary Tract and Skin and Soft Tissue (location LLQ)  Antibiotics given-   Antibiotics (72h ago, onward)      Start     Stop Route Frequency Ordered    04/23/24 0230  vancomycin 1,250 mg in dextrose 5 % (D5W) 250 mL IVPB (Vial-Mate)         -- IV Every 12 hours (non-standard times) 04/22/24 1306    04/22/24 1350  vancomycin - pharmacy to dose  (vancomycin IVPB (PEDS and ADULTS))        Placed in "And" Linked Group    -- IV pharmacy to manage frequency 04/22/24 1251          Latest lactate reviewed-  Recent Labs   Lab 04/21/24  0230 04/21/24  0609   LACTATE  --  2.0   POCLAC 1.91*  --            Fluid challenge Contraindicated- Fluid bolus is contraindicated in this patient due to Congestive Heart Failure     Post- resuscitation assessment No - Post resuscitation assessment not needed     Source control achieved by: antibiotics    Surgery consulted   Patient was evaluated by surgeon earlier today who advised conservative management.    Antibiotics changed to IV vancomycin, nasal MRSA ordered.    Will get repeat procalcitonin tomorrow am.  ID was consulted by surgery.      "

## 2024-04-22 NOTE — RESPIRATORY THERAPY
Patient receiving aerosol tx via duoneb now and q6hr w/a and 0.25mg pulmicort now and bid.  Hr 79 and 02 saturation 94% on nc at 2lpm.

## 2024-04-22 NOTE — HPI
73-year-old female well known to me from hernia.  We were planning repair many years ago until she had a heart attack that stopped her work up. She presents today after getting the skin scratched and developing redness over the abdominal hernia.  No drainage or pus.  No fever chills.  Stopped smoking many years ago.

## 2024-04-22 NOTE — ASSESSMENT & PLAN NOTE
Patient with Permanent atrial fibrillation which is uncontrolled currently with Beta Blocker. Patient is currently in atrial fibrillation.[id number] Score: 4.  Anticoagulation indicated. Anticoagulation done with Eliquis .

## 2024-04-22 NOTE — CONSULTS
"[facility]/Surg   Department of Infectious Disease  Consult Note        PATIENT NAME: Nelly Tian  MRN: [id number]  TODAY'S DATE: 04/22/2024  ADMIT DATE: 4/21/2024  LOS: 1 days    CHIEF COMPLAINT: Wound Infection (sore on left lower abdomen with foul drainage)      PRINCIPLE PROBLEM: Severe sepsis    REASON FOR CONSULT:  Cellulitis-SEVERE!    ASSESSMENT and PLAN     Sepsis secondary to large anterior abdominal cellulitis/SSTI in the setting of prior trauma and self-picking behavior - and unrepaired large ventral hernia  Blood cultures x2 sets 4/21 no growth to date, pending final    PMHx:  AFib/a flutter on Eliquis, CHF, COPD, diabetes    Obesity, BMI 53.2 kg/m2      RECOMMENDATIONS:   Stop Zosyn and ceftriaxone IV   MRSA nasal swab ordered  Start vancomycin IV, keep level 15-20   Baseline CPK, sed rate and CRP ordered with a.m. labs   Follow cultures   Wound care to ALL affected areas   Monitor WBC and temp curve  PT/OT as tolerated  Aspiration precautions    D/w patient, wound care RN at bedside, Dr Lucero/Surgery    Thank you for this consult. Please send [employer] secure chat with any questions.    Antibiotics (From admission, onward)      Start     Stop Route Frequency Ordered    04/23/24 0230  vancomycin 1,250 mg in dextrose 5 % (D5W) 250 mL IVPB (Vial-Mate)         -- IV Every 12 hours (non-standard times) 04/22/24 1306    04/22/24 1350  vancomycin - pharmacy to dose  (vancomycin IVPB (PEDS and ADULTS))        Placed in "And" Linked Group    -- IV pharmacy to manage frequency 04/22/24 1251          Antifungals (From admission, onward)      None           Antivirals (From admission, onward)      None            HPI      Nelly Tian is a 73 y.o. female  obese, BMI 53.2Kg/m2 with past medical history of AFib/a flutter on Eliquis, CHF, COPD, diabetes, and prior complicated history of hernia repair which has not been able to be repaired due to prior heart attack.  Patient " mentions she was planning to have surgical hernia repair by COVID-19 pandemic happened and she has not been able to follow-up.  She is known to our service, last seen in clinic by Dr. Juan in October of 2023 for recurrent left large abdominal wall cellulitis.  She was  on suppressive therapy with cefadroxil 500 mg twice a day until January of this year.    She presented on 04/21 after hitting her abdomen with the chair and noticing worsening redness and spreading edema on her left lower abdomen.  She mentions her daughter has been performing wound care at home and changing dressing regularly.  She complains of having chills, denies fever or night sweats.  She reports her abdominal pain was about 8/10 but now is better.  She also scratches the area because she reports is itchy.  She denies headache, no nausea or cough, no shortness of breath, no chest pain, no dysuria increased urinary frequency, no change in bowel movements.      In the ER, labile BP, T-max a 100.1°   Labs on admission leukocytosis 13.9, left shift 78.1%, H&H 15.1/48.4, MCV 94, platelet count 210   Stable kidney function and liver function tests, normal electrolytes     Lactic acid 2, negative   Procalcitonin 0.04, negative   UA negative for UTI   Chest x-ray with cardiomegaly and mild pulmonary edema.    Empirically started on Rocephin and Zosyn IV.     Outdoor activities:  Lives at home with daughter, ambulates with a walker at home.  Former smoker, no alcohol, no drugs.  Travel:  None  Implants:  None  Antibiotic history:  See HPi    Social History  Marital Status:   Alcohol History:  reports no history of alcohol use.  Tobacco History:  reports that she quit smoking about 21 months ago. Her smoking use included cigarettes. She started smoking about 56 years ago. She has a 16.3 pack-year smoking history. She has never used smokeless tobacco.  Drug History:  reports no history of drug use.    Review of patient's allergies indicates:    Allergen Reactions    Dilaudid [hydromorphone] Anaphylaxis     Other reaction(s): Anaphylaxis  Other reaction(s): Unknown    Zyvox [linezolid in dextrose 5%] Shortness Of Breath     Past Medical History:   Diagnosis Date    *Atrial flutter     Angina pectoris 2017    Anxiety     Arthritis     Asthma     Atrial fibrillation     Back pain     Cataract     OD    Chest pain 2017    CHF (congestive heart failure)     Chronically on benzodiazepine therapy 2019    COPD (chronic obstructive pulmonary disease)     Depression     Diabetes mellitus     Emphysema of lung     Heart failure     Hepatomegaly 2/3/2016    Hernia     History of MI (myocardial infarction) 2016    Hypercapnic respiratory failure, chronic 2016    Hyperlipidemia     Hypertension     Iron deficiency anemia 2/3/2016    Myocardial infarction     Obesity     Peripheral vascular disease     Pneumonia     Polyneuropathy     Retinal detachment     OS    Septic shock 2017    Skin ulcer 3/18/2017    Tobacco dependence     Type II 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, not stated as uncontrolled(250.60)      Past Surgical History:   Procedure Laterality Date    BREAST BIOPSY Left 10 yrs ago    benign    CATARACT EXTRACTION Left     OS     CATARACT EXTRACTION W/  INTRAOCULAR LENS IMPLANT Right 2023    Procedure: CEIOL OD;  Surgeon: David Bautista MD;  [facility];  Service: Ophthalmology;  Laterality: Right;     SECTION      x2    EYE SURGERY      HYSTERECTOMY      partial    OOPHORECTOMY      one ovary conserved    RETINAL DETACHMENT SURGERY      buckle --OS    TONSILLECTOMY       Family History   Problem Relation Name Age of Onset    Alcohol abuse Mother      Cirrhosis Mother      Arthritis Father      Heart disease Father      Heart attack Father      Arrhythmia Father      Coronary artery disease Father      Coronary artery disease Sister      Early death Sister  30         heart disease    Heart disease Sister  32        MI    Heart attack Sister      Arthritis Sister      Heart disease Sister      Crohn's disease Sister      Cancer Brother          Lung cancer    Lung cancer Brother      No Known Problems Brother      No Known Problems Daughter x1     Blindness Son x1         due to accident//    Breast cancer Neg Hx      Glaucoma Neg Hx      Macular degeneration Neg Hx      Retinal detachment Neg Hx      Amblyopia Neg Hx      Diabetes Neg Hx      Cataracts Neg Hx      Hypertension Neg Hx      Strabismus Neg Hx      Stroke Neg Hx      Thyroid disease Neg Hx         SUBJECTIVE     Review of systems  Constitutional:  Denies fevers, chills, night sweats, loss of appetite.  HEENT: Denies visual changes, ear pain, sinus congestion, mouth pain or trouble swallowing, sore throat or dental pain.  Neck: Denies neck pain or lumps.  Respiratory: Denies shortness of breath, coughing, wheezing or hemoptysis.  Cardiovascular:  Denies chest pain, palpitations or edema.  Gastrointestinal: Denies  nausea, vomiting, constipation or diarrhea.  Genitourinary:  Denies dysuria, frequency, urgency or hematuria   Musculoskeletal:  Denies joint pain or swelling, difficulty walking.    Skin:  Worsening left lower large abdominal redness, edema, tenderness to palpation.   Neurologic:  Denies motor or sensory loss, headaches or dizziness.    Psychiatric:  Denies changes in mood or behavior.     OBJECTIVE   Temp:  [97.2 °F (36.2 °C)-98.1 °F (36.7 °C)] 97.5 °F (36.4 °C)  Pulse:  [64-79] 69  Resp:  [17-20] 18  SpO2:  [93 %-97 %] 95 %  BP: ()/(45-70) 107/49  Temp:  [97.2 °F (36.2 °C)-98.1 °F (36.7 °C)]   Temp: 97.5 °F (36.4 °C) (04/22/24 1629)  Pulse: 69 (04/22/24 1629)  Resp: 18 (04/22/24 1629)  BP: (!) 107/49 (04/22/24 1629)  SpO2: 95 % (04/22/24 1356)    Intake/Output Summary (Last 24 hours) at 4/22/2024 1740  Last data filed at 4/21/2024 1816  Gross per 24 hour   Intake 88.26 ml   Output --   Net 88.26 ml        Physical Exam  General:  Older obese lady, lying in bed, breathing comfortable on room air  Eyes: Eyes with no icterus or injection. Vision grossly normal  Ears: Hearing grossly normal.  Nose: Nares patent  Mouth: Moist mucous membranes, dentition is very 4, good dental hygiene. No ulcerations, erythema or exudates.  Neck: Supple, no tenderness to palpation.  Cardiovascular: Regular rate and rhythm, no murmurs, no edema.    Respiratory:  Clear to auscultation bilaterally, no tachypnea or increased work of breathing.  Gastrointestinal:  Soft with active bowel sounds, no tenderness to palpation, no distention.  Genitourinary:  No suprapubic tenderness.  Musculoskeletal:  Moves all extremities with good strength.  Fingernails are slightly  hyperemic, nontender to palpation   Skin:  Warm, impressive left large anterior abdominal wall cellulitis with multiple excoriated areas, one of them bleeding, others healing,  no active evidence of purulent drainage noted, tender to palpation consistent with ongoing cellulitis, see picture below.    Neuro:   Oriented, conversant, follows commands.  Psych: Good mood, normal affect.   VAD: PIV  ISOLATION: None yet    Wounds:   4/22:    4/21:          Significant Labs: All pertinent labs within the past 24 hours have been reviewed.    CBC LAST 7  Recent Labs   Lab 04/21/24  0240 04/22/24 0421   WBC 13.92* 18.11*   RBC 4.89 4.37   HGB 15.1 13.2   HCT 48.4 41.2   MCV 99* 94   MCH 30.9 30.2   MCHC 31.2* 32.0   RDW 14.0 14.6*    201   MPV 9.9 10.4   GRAN 78.1*  10.9* 77.4*  14.0*   LYMPH 9.6*  1.3 10.9*  2.0   MONO 8.5  1.2* 10.3  1.9*   BASO 0.04 0.05   NRBC 0 0       CHEMISTRY LAST 7  Recent Labs   Lab 04/21/24  0230 04/21/24  0240 04/22/24  0421   NA  --  136 133*   K  --  4.9 3.5   CL  --  96 96   CO2  --  24 26   ANIONGAP  --  16 11   BUN  --  14 18   CREATININE  --  0.7 0.7   GLU  --  119* 107   CALCIUM  --  10.4 9.6   PH 7.484*  --   --    MG  --  1.8 2.1  "  ALBUMIN  --  3.8  --    PROT  --  7.7  --    ALKPHOS  --  100  --    ALT  --  19  --    AST  --  26  --    BILITOT  --  0.4  --        Estimated Creatinine Clearance: 116.9 mL/min (based on SCr of 0.7 mg/dL).    INFLAMMATORY/PROCAL  LAST 7  Recent Labs   Lab 04/21/24  0240   PROCAL 0.04     No results found for: "ESR"  CRP   Date Value Ref Range Status   06/26/2023 19.7 (H) 0.0 - 8.2 mg/L Final   06/24/2023 76.2 (H) 0.0 - 8.2 mg/L Final   08/18/2022 174.8 (H) 0.0 - 8.2 mg/L Final   05/08/2019 71.4 (H) 0.0 - 8.2 mg/L Final   05/07/2019 97.6 (H) 0.0 - 8.2 mg/L Final   05/06/2019 139.5 (H) 0.0 - 8.2 mg/L Final   05/05/2019 200.8 (H) 0.0 - 8.2 mg/L Final       PRIOR  MICROBIOLOGY:    Susceptibility data from last 90 days.  Collected Specimen Info Organism   02/19/24 Blood from Peripheral, Forearm, Left No growth after 5 days.   02/19/24 Blood from Peripheral, Forearm, Right No growth after 5 days.         LAST 7 DAYS MICROBIOLOGY   Microbiology Results (last 7 days)       Procedure Component Value Units Date/Time    MRSA Screen by PCR [[id number]] Collected: 04/22/24 1313    Order Status: Sent Specimen: Nasal Swab Updated: 04/22/24 1325    Blood culture x two cultures. Draw prior to antibiotics. [[id number]] Collected: 04/21/24 0240    Order Status: Completed Specimen: Blood from Peripheral, Wrist, Left Updated: 04/22/24 1032     Blood Culture, Routine No Growth to date      No Growth to date    Narrative:      Aerobic and anaerobic    Blood culture x two cultures. Draw prior to antibiotics. [[id number]] Collected: 04/21/24 0240    Order Status: Completed Specimen: Blood from Peripheral, Antecubital, Right Updated: 04/22/24 1032     Blood Culture, Routine No Growth to date      No Growth to date    Narrative:      Aerobic and anaerobic    Influenza A & B by Molecular [[id number]] Collected: 04/21/24 0219    Order Status: Completed Specimen: Nasal Swab Updated: 04/21/24 0309     Influenza A, Molecular Negative     " Influenza B, Molecular Negative     Flu A & B Source Nasal swab              CURRENT/PREVIOUS VISIT EKG  Results for orders placed or performed during the hospital encounter of 02/19/24   EKG 12-lead    Collection Time: 02/19/24  1:15 AM   Result Value Ref Range    QRS Duration 112 ms    [facility] QTC Calculation 447 ms    Narrative    Test Reason : R00.0,    Vent. Rate : 097 BPM     Atrial Rate : 066 BPM     P-R Int : 000 ms          QRS Dur : 112 ms      QT Int : 352 ms       P-R-T Axes : 000 121 093 degrees     QTc Int : 447 ms    Atrial fibrillation  Right axis deviation  Septal infarct (cited on or before 31-JAN-2023)  Abnormal ECG  When compared with ECG of 22-JUN-2023 14:10,  Atrial fibrillation has replaced Atrial flutter  Confirmed by Sosa ADEN, Prakash LIND ([id number]) on 2/24/2024 6:44:24 AM    Referred By: [email or web address]   SELF           Confirmed By:Prakash Swan MD            Significant Imaging: I have reviewed all relevant and available imaging results/findings within the past 24 hours.    CXR: Cardiomegaly with likely mild pulmonary edema.     Joanie Browning MD  Date of Service: 04/22/2024      This note was created using M [employer] voice recognition software that occasionally misinterpreted phrases or words.

## 2024-04-22 NOTE — CONSULTS
[facility]/Surg  General Surgery  Consult Note    Patient Name: Nelly Tian  MRN: [id number]  Code Status: Full Code  Admission Date: 4/21/2024  Hospital Length of Stay: 1 days  Attending Physician: Garrett Mcneal MD  Primary Care Provider: Constantine Bird MD    Patient information was obtained from patient and ER records.     Inpatient consult to General Surgery  Consult performed by: Esther Lucero MD  Consult ordered by: Garrett Mcneal MD  Reason for consult: abdominal wall cellulitis and hernia  Assessment/Recommendations: antibiotics        Subjective:     Principal Problem: Severe sepsis    History of Present Illness: 73-year-old female well known to me from hernia.  We were planning repair many years ago until she had a heart attack that stopped her work up. She presents today after getting the skin scratched and developing redness over the abdominal hernia.  No drainage or pus.  No fever chills.  Stopped smoking many years ago.    Current Facility-Administered Medications   Medication Dose Route Frequency Provider Last Rate Last Admin    acetaminophen tablet 650 mg  650 mg Oral Q4H PRN Evelyn Townsend DNP   650 mg at 04/22/24 0304    albuterol-ipratropium 2.5 mg-0.5 mg/3 mL nebulizer solution 3 mL  3 mL Nebulization Q6H WAKE Evelyn Townsend DNP   3 mL at 04/21/24 2015    aluminum-magnesium hydroxide-simethicone 200-200-20 mg/5 mL suspension 30 mL  30 mL Oral QID PRN Evelyn Townsend DNP        apixaban tablet 5 mg  5 mg Oral BID Evelyn Townsend DNP   5 mg at 04/21/24 2013    atorvastatin tablet 10 mg  10 mg Oral Daily Evelyn Townsend DNP   10 mg at 04/21/24 1042    budesonide nebulizer solution 0.25 mg  0.25 mg Nebulization Daily Evelyn Townsend DNP   0.25 mg at 04/21/24 0702    cefTRIAXone (Rocephin) 1 g in dextrose 5 % in water (D5W) 100 mL IVPB (MB+)  1 g Intravenous Q24H Evelyn Townsend DNP   Stopped at 04/22/24 0303    dextrose 10% bolus 125 mL 125 mL  12.5 g  Intravenous PRN Evelyn Townsend DNP        dextrose 10% bolus 250 mL 250 mL  25 g Intravenous PRN Evelyn Townsend DNP        furosemide tablet 40 mg  40 mg Oral Daily Evelyn Townsend DNP   40 mg at 04/21/24 1042    gabapentin capsule 200 mg  200 mg Oral TID Evelyn Townsend DNP   200 mg at 04/21/24 2013    glucagon (human recombinant) injection 1 mg  1 mg Intramuscular PRN Evelyn Townsend DNP        glucose chewable tablet 16 g  16 g Oral PRN Evelyn Townsend DNP        glucose chewable tablet 24 g  24 g Oral PRN Evelyn Townsend DNP        insulin aspart U-100 pen 0-5 Units  0-5 Units Subcutaneous QID (AC + HS) PRN Evelyn Townsend DNP        lisinopriL tablet 20 mg  20 mg Oral Daily Evelyn Townsend DNP   20 mg at 04/21/24 1042    magnesium oxide tablet 800 mg  800 mg Oral PRN Evelyn Townsend DNP        magnesium oxide tablet 800 mg  800 mg Oral PRN Evelyn Townsend DNP        melatonin tablet 9 mg  9 mg Oral Nightly PRN Evleyn Townsend DNP   9 mg at 04/21/24 2039    morphine injection 2 mg  2 mg Intravenous Q4H PRN Evelyn Townsend DNP        naloxone 0.4 mg/mL injection 0.02 mg  0.02 mg Intravenous PRN Evelyn Townsend DNP        ondansetron injection 4 mg  4 mg Intravenous Q6H PRN Evelyn Townsend DNP        ondansetron injection 4 mg  4 mg Intravenous Q6H PRN Evelyn Townsend DNP        piperacillin-tazobactam (ZOSYN) 4.5 g in dextrose 5 % in water (D5W) 100 mL IVPB (MB+)  4.5 g Intravenous Q8H Evelyn Townsend DNP 25 mL/hr at 04/22/24 0348 4.5 g at 04/22/24 0348    potassium bicarbonate disintegrating tablet 35 mEq  35 mEq Oral PRN Evelyn Townsend DNP        potassium bicarbonate disintegrating tablet 50 mEq  50 mEq Oral PRN Evelyn Townsend DNP   50 mEq at 04/22/24 0624    potassium bicarbonate disintegrating tablet 60 mEq  60 mEq Oral PRN Evelyn Townsend DNP        senna-docusate 8.6-50 mg per tablet 1 tablet  1 tablet Oral BID PRN Evelyn Townsend DNP        sodium chloride 0.9% flush 10 mL  10 mL  Intravenous Q12H PRN Evelyn Townsend DNP        spironolactone tablet 25 mg  25 mg Oral Once per day on  Evelyn Townsend DNP           Review of patient's allergies indicates:   Allergen Reactions    Dilaudid [hydromorphone] Anaphylaxis     Other reaction(s): Anaphylaxis  Other reaction(s): Unknown    Zyvox [linezolid in dextrose 5%] Shortness Of Breath       Past Medical History:   Diagnosis Date    *Atrial flutter     Angina pectoris 2017    Anxiety     Arthritis     Asthma     Atrial fibrillation     Back pain     Cataract     OD    Chest pain 2017    CHF (congestive heart failure)     Chronically on benzodiazepine therapy 2019    COPD (chronic obstructive pulmonary disease)     Depression     Diabetes mellitus     Emphysema of lung     Heart failure     Hepatomegaly 2/3/2016    Hernia     History of MI (myocardial infarction) 2016    Hypercapnic respiratory failure, chronic 2016    Hyperlipidemia     Hypertension     Iron deficiency anemia 2/3/2016    Myocardial infarction     Obesity     Peripheral vascular disease     Pneumonia     Polyneuropathy     Retinal detachment     OS    Septic shock 2017    Skin ulcer 3/18/2017    Tobacco dependence     Type II 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, not stated as uncontrolled(250.60)      Past Surgical History:   Procedure Laterality Date    BREAST BIOPSY Left 10 yrs ago    benign    CATARACT EXTRACTION Left     OS     CATARACT EXTRACTION W/  INTRAOCULAR LENS IMPLANT Right 2023    Procedure: CEIOL OD;  Surgeon: David Bautista MD;  Location: [facility];  Service: Ophthalmology;  Laterality: Right;     SECTION      x2    EYE SURGERY      HYSTERECTOMY      partial    OOPHORECTOMY      one ovary conserved    RETINAL DETACHMENT SURGERY      buckle --OS    TONSILLECTOMY       Family History       Problem Relation (Age of Onset)    Alcohol abuse Mother    Arrhythmia Father     Arthritis Father, Sister    Blindness Son    Cancer Brother    Cirrhosis Mother    Coronary artery disease Father, Sister    Crohn's disease Sister    Early death Sister (30)    Heart attack Father, Sister    Heart disease Father, Sister (32), Sister    Lung cancer Brother    No Known Problems Brother, Daughter          Tobacco Use    Smoking status: Former     Current packs/day: 0.00     Average packs/day: 0.3 packs/day for 54.4 years (16.3 ttl pk-yrs)     Types: Cigarettes     Start date: 1968     Quit date: 2022     Years since quittin.8    Smokeless tobacco: Never   Substance and Sexual Activity    Alcohol use: No     Alcohol/week: 0.0 standard drinks of alcohol    Drug use: No    Sexual activity: Not Currently     Review of Systems   Constitutional:  Negative for activity change, appetite change, chills, diaphoresis, fatigue and fever.   Respiratory:  Negative for cough, shortness of breath and wheezing.    Cardiovascular:  Negative for chest pain and leg swelling.   Gastrointestinal:  Positive for abdominal distention. Negative for abdominal pain, constipation, nausea and vomiting.   Genitourinary:  Negative for difficulty urinating.   Neurological:  Negative for dizziness, light-headedness and headaches.   All other systems reviewed and are negative.    Objective:     Vital Signs (Most Recent):  Temp: 98.1 °F (36.7 °C) (24)  Pulse: 64 (24)  Resp: 18 (24)  BP: (!) 94/45 (24)  SpO2: (!) 94 % (24) Vital Signs (24h Range):  Temp:  [97.6 °F (36.4 °C)-98.3 °F (36.8 °C)] 98.1 °F (36.7 °C)  Pulse:  [] 64  Resp:  [17-22] 18  SpO2:  [93 %-97 %] 94 %  BP: ()/(45-55) 94/45     Weight: (!) 158.8 kg (350 lb)  Body mass index is 53.22 kg/m².     Physical Exam  Vitals and nursing note reviewed.   Constitutional:       Appearance: She is obese. She is not ill-appearing.   Cardiovascular:      Rate and Rhythm: Tachycardia present.      Pulses:  Normal pulses.   Pulmonary:      Effort: Pulmonary effort is normal. No respiratory distress.      Breath sounds: Normal breath sounds. No wheezing.   Abdominal:      General: Bowel sounds are normal. There is no distension.      Palpations: There is no mass.      Tenderness: There is no abdominal tenderness. There is no rebound.      Hernia: A hernia is present.      Comments: Large lateral hernia, larger than her abdomen. With overlying erythema.  No obvious pus   Musculoskeletal:      Right lower leg: No edema.      Left lower leg: No edema.   Skin:     General: Skin is warm.      Capillary Refill: Capillary refill takes less than 2 seconds.      Findings: Erythema and wound (LLQ dressing cdi) present.      Comments: LLQ abdominal wound; possible cellulitis. Dsg CDI.   Neurological:      Mental Status: She is alert and oriented to person, place, and time. Mental status is at baseline.            I have reviewed all pertinent lab results within the past 24 hours.  CBC:   Recent Labs   Lab 04/22/24  0421   WBC 18.11*   RBC 4.37   HGB 13.2   HCT 41.2      MCV 94   MCH 30.2   MCHC 32.0     CMP:   Recent Labs   Lab 04/21/24  0240 04/22/24  0421   * 107   CALCIUM 10.4 9.6   ALBUMIN 3.8  --    PROT 7.7  --     133*   K 4.9 3.5   CO2 24 26   CL 96 96   BUN 14 18   CREATININE 0.7 0.7   ALKPHOS 100  --    ALT 19  --    AST 26  --    BILITOT 0.4  --        Significant Diagnostics:  I have reviewed all pertinent imaging results/findings within the past 24 hours.    Assessment/Plan:     Abdominal wall cellulitis  Large, already improving overnight from marked area  Would continue antibiotics  No obvious need for surgery at this time unless abscess develops or infection worsens  Hernia can be addressed as outpatien    Spoke with primary  VTE Risk Mitigation (From admission, onward)           Ordered     apixaban tablet 5 mg  2 times daily         04/21/24 0512     IP VTE HIGH RISK PATIENT  Once          04/21/24 0512     Place sequential compression device  Until discontinued         04/21/24 0512                    Thank you for your consult. I will sign off. Please contact us if you have any additional questions.    Esther Lucero MD  General Surgery  [facility]/Surg

## 2024-04-22 NOTE — PT/OT/SLP EVAL
Occupational Therapy   Evaluation and Discharge Note    Name: Nelly Tian  MRN: [id number]  Admitting Diagnosis: Severe sepsis  Recent Surgery: * No surgery found *      Recommendations:     Discharge Recommendations: No Therapy Indicated  Discharge Equipment Recommendations: none  Barriers to discharge:  None    Assessment:     Nelly Tian is a 73 y.o. female with a medical diagnosis of Severe sepsis. At this time, patient is modified independent with ADLs. Patient does not require further acute OT services.     Plan:     During this hospitalization, patient does not require further acute OT services.  Please re-consult if situation changes.    Plan of Care Reviewed with: patient    Subjective     Chief Complaint: none  Patient/Family Comments/goals: none    Occupational Profile:  Living Environment: Patient lives with daughter in a [facility].   Previous level of function: Patient was Mod I with ADLs. Patient was ambulatory using rollator.   Equipment Used at home: oxygen, rollator  Assistance upon Discharge: Patient will receive assistance from daughter if needed.     Pain/Comfort:  Pain Rating 1:  (not rated)  Location - Orientation 1: generalized  Location 1: back  Pain Addressed 1: Reposition, Distraction  Pain Rating Post-Intervention 1:  (not rated)    Patients cultural, spiritual, Restoration conflicts given the current situation: no    Objective:     Communicated with: nurse Ro prior to session.  Patient found HOB elevated with telemetry, oxygen upon OT entry to room.    General Precautions: Standard, fall, anti-coagulation medicine  Orthopedic Precautions: N/A  Braces: N/A  Respiratory Status: Nasal cannula, flow 3 L/min     Occupational Performance:    Bed Mobility:    Patient completed Scooting/Bridging with modified independence  Patient completed Supine to Sit with modified independence  Patient completed Sit to Supine with modified independence    Functional Mobility/Transfers:  Patient  completed Sit <> Stand Transfer with supervision  with  rolling walker   Patient completed Toilet Transfer Stand Pivot technique with supervision with  rolling walker    Activities of Daily Living:  Grooming: modified independence with hand hygiene while standing at sink  Lower Body Dressing: modified independence to don/doff socks seated EOB  Toileting: modified independence with voiding while seated on toilet    Cognitive/Visual Perceptual:  Cognitive/Psychosocial Skills:     -       Oriented to: x4   -       Follows Commands/attention:Follows multistep  commands  -       Communication: clear/fluent  -       Safety awareness/insight to disability: intact   -       Mood/Affect/Coping skills/emotional control: Appropriate to situation and Cooperative  Visual/Perceptual:      -Intact     Physical Exam:  Postural examination/scapula alignment:    -       Rounded shoulders  -       Forward head  Upper Extremity Range of Motion:     -       Right Upper Extremity: WFL  -       Left Upper Extremity: WFL  Upper Extremity Strength:    -       Right Upper Extremity: WFL  -       Left Upper Extremity: WFL   Strength:    -       Right Upper Extremity: WFL  -       Left Upper Extremity: WFL  Fine Motor Coordination:    -       Intact  Gross motor coordination:   WFL    AMPAC 6 Click ADL:  AMPAC Total Score: 24    Treatment & Education:  OT ed pt on OT role & POC as well as discharge recommendations.  OT ed patient on safety with walker use for functional mobility with cues for hand placement & sequencing.       Patient left HOB elevated with all lines intact, call button in reach, bed alarm on, and nurse notified    GOALS:   Multidisciplinary Problems       Occupational Therapy Goals       Not on file                    History:     Past Medical History:   Diagnosis Date    *Atrial flutter     Angina pectoris 9/18/2017    Anxiety     Arthritis     Asthma     Atrial fibrillation     Back pain     Cataract     OD    Chest  pain 2017    CHF (congestive heart failure)     Chronically on benzodiazepine therapy 2019    COPD (chronic obstructive pulmonary disease)     Depression     Diabetes mellitus     Emphysema of lung     Heart failure     Hepatomegaly 2/3/2016    Hernia     History of MI (myocardial infarction) 2016    Hypercapnic respiratory failure, chronic 2016    Hyperlipidemia     Hypertension     Iron deficiency anemia 2/3/2016    Myocardial infarction     Obesity     Peripheral vascular disease     Pneumonia     Polyneuropathy     Retinal detachment     OS    Septic shock 2017    Skin ulcer 3/18/2017    Tobacco dependence     Type II 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, not stated as uncontrolled(250.60)          Past Surgical History:   Procedure Laterality Date    BREAST BIOPSY Left 10 yrs ago    benign    CATARACT EXTRACTION Left     OS     CATARACT EXTRACTION W/  INTRAOCULAR LENS IMPLANT Right 2023    Procedure: CEIOL OD;  Surgeon: David Bautista MD;  [facility];  Service: Ophthalmology;  Laterality: Right;     SECTION      x2    EYE SURGERY      HYSTERECTOMY      partial    OOPHORECTOMY      one ovary conserved    RETINAL DETACHMENT SURGERY      buckle --OS    TONSILLECTOMY         Time Tracking:     OT Date of Treatment: 24  OT Start Time: 1139  OT Stop Time: 1152  OT Total Time (min): 13 min    Billable Minutes:Evaluation 5  Self Care/Home Management 8    2024

## 2024-04-22 NOTE — ASSESSMENT & PLAN NOTE
Patient's FSGs are uncontrolled due to hyperglycemia on current medication regimen.  Last A1c reviewed-   Lab Results   Component Value Date    HGBA1C 6.0 02/19/2024     Most recent fingerstick glucose reviewed-   Recent Labs   Lab 04/21/24 2020 04/22/24  0749 04/22/24  1133   POCTGLUCOSE 81 112* 92     Current correctional scale  Low  Maintain anti-hyperglycemic dose as follows-   Antihyperglycemics (From admission, onward)    Start     Stop Route Frequency Ordered    04/21/24 0609  insulin aspart U-100 pen 0-5 Units         -- SubQ Before meals & nightly PRN 04/21/24 0512        Hold Oral hypoglycemics while patient is in the hospital.

## 2024-04-22 NOTE — CONSULTS
Chief complaint:  Wound Infection (sore on left lower abdomen with foul drainage)      HPI:  Nelly Tian is a 73 y.o. female presenting with open wounds of the left abdomen, left thigh and MAD of the BL buttocks all POA. Pt did not know the origin of her wounds, but her daughter was putting antibiotic ointment on them, and she developed significant drainage prior to the cellulitis. No other complaints today    PMH:  As per HPI and below:  Past Medical History:   Diagnosis Date    *Atrial flutter     Angina pectoris 2017    Anxiety     Arthritis     Asthma     Atrial fibrillation     Back pain     Cataract     OD    Chest pain 2017    CHF (congestive heart failure)     Chronically on benzodiazepine therapy 2019    COPD (chronic obstructive pulmonary disease)     Depression     Diabetes mellitus     Emphysema of lung     Heart failure     Hepatomegaly 2/3/2016    Hernia     History of MI (myocardial infarction) 2016    Hypercapnic respiratory failure, chronic 2016    Hyperlipidemia     Hypertension     Iron deficiency anemia 2/3/2016    Myocardial infarction     Obesity     Peripheral vascular disease     Pneumonia     Polyneuropathy     Retinal detachment     OS    Septic shock 2017    Skin ulcer 3/18/2017    Tobacco dependence     Type II 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, not stated as uncontrolled(250.60)        Social History     Socioeconomic History    Marital status:    Tobacco Use    Smoking status: Former     Current packs/day: 0.00     Average packs/day: 0.3 packs/day for 54.4 years (16.3 ttl pk-yrs)     Types: Cigarettes     Start date: 1968     Quit date: 2022     Years since quittin.8    Smokeless tobacco: Never   Substance and Sexual Activity    Alcohol use: No     Alcohol/week: 0.0 standard drinks of alcohol    Drug use: No    Sexual activity: Not Currently     Social Determinants of Health     Financial  Resource Strain: Medium Risk (3/18/2024)    Overall Financial Resource Strain (CARDIA)     Difficulty of Paying Living Expenses: Somewhat hard   Food Insecurity: Food Insecurity Present (3/18/2024)    Hunger Vital Sign     Worried About Running Out of Food in the Last Year: Sometimes true     Ran Out of Food in the Last Year: Never true   Transportation Needs: Unmet Transportation Needs (3/18/2024)    PRAPARE - Transportation     Lack of Transportation (Medical): Yes     Lack of Transportation (Non-Medical): Yes   Physical Activity: Insufficiently Active (3/18/2024)    Exercise Vital Sign     Days of Exercise per Week: 1 day     Minutes of Exercise per Session: 10 min   Stress: No Stress Concern Present (3/18/2024)    [place] [employer] - Occupational Stress Questionnaire     Feeling of Stress : Not at all   Social Connections: Moderately Integrated (3/18/2024)    Social Connection and Isolation Panel [NHANES]     Frequency of Communication with Friends and Family: Once a week     Frequency of Social Gatherings with Friends and Family: Three times a week     Attends Zoroastrian Services: More than 4 times per year     Active Member of Clubs or Organizations: No     Attends Club or Organization Meetings: 1 to 4 times per year     Marital Status:    Housing Stability: Unknown (3/18/2024)    Housing Stability Vital Sign     Unable to Pay for Housing in the Last Year: No     Unstable Housing in the Last Year: No       Past Surgical History:   Procedure Laterality Date    BREAST BIOPSY Left 10 yrs ago    benign    CATARACT EXTRACTION Left     OS     CATARACT EXTRACTION W/  INTRAOCULAR LENS IMPLANT Right 2023    Procedure: CEIOL OD;  Surgeon: David Bautista MD;  Location: [facility] OR;  Service: Ophthalmology;  Laterality: Right;     SECTION      x2    EYE SURGERY      HYSTERECTOMY      partial    OOPHORECTOMY      one ovary conserved    RETINAL DETACHMENT SURGERY      buckle --OS     TONSILLECTOMY         Family History   Problem Relation Name Age of Onset    Alcohol abuse Mother      Cirrhosis Mother      Arthritis Father      Heart disease Father      Heart attack Father      Arrhythmia Father      Coronary artery disease Father      Coronary artery disease Sister      Early death Sister  30        heart disease    Heart disease Sister  32        MI    Heart attack Sister      Arthritis Sister      Heart disease Sister      Crohn's disease Sister      Cancer Brother          Lung cancer    Lung cancer Brother      No Known Problems Brother      No Known Problems Daughter x1     Blindness Son x1         due to accident//    Breast cancer Neg Hx      Glaucoma Neg Hx      Macular degeneration Neg Hx      Retinal detachment Neg Hx      Amblyopia Neg Hx      Diabetes Neg Hx      Cataracts Neg Hx      Hypertension Neg Hx      Strabismus Neg Hx      Stroke Neg Hx      Thyroid disease Neg Hx         Review of patient's allergies indicates:   Allergen Reactions    Dilaudid [hydromorphone] Anaphylaxis     Other reaction(s): Anaphylaxis  Other reaction(s): Unknown    Zyvox [linezolid in dextrose 5%] Shortness Of Breath       No current facility-administered medications on file prior to encounter.     Current Outpatient Medications on File Prior to Encounter   Medication Sig Dispense Refill    albuterol (PROVENTIL/VENTOLIN HFA) 90 mcg/actuation inhaler INHALE 2 PUFFS EVERY 6 HOURS AS NEEDED FOR WHEEZING (RESCUE) 18 g 5    albuterol-ipratropium (DUO-NEB) 2.5 mg-0.5 mg/3 mL nebulizer solution Take 3 mLs by nebulization every 6 (six) hours as needed for Wheezing. Rescue 75 mL 0    ascorbic acid, vitamin C, (VITAMIN C) 500 MG tablet Take 2 tablets (1,000 mg total) by mouth every evening.      atorvastatin (LIPITOR) 10 MG tablet TAKE 1 TABLET(10 MG) BY MOUTH EVERY OTHER DAY 35 tablet 3    blood sugar diagnostic Strp To check BG 2 times daily, to use with insurance preferred meter 100 each 5    blood-glucose  meter kit To check BG 2 times daily, to use with insurance preferred meter 1 each 1    budesonide (PULMICORT) 0.25 mg/2 mL nebulizer solution Take 2 mLs (0.25 mg total) by nebulization once daily. Controller 20 each 3    ELIQUIS 5 mg Tab TAKE 1 TABLET TWICE DAILY 180 tablet 3    empagliflozin (JARDIANCE) 25 mg tablet Take 1 tablet (25 mg total) by mouth once daily. 90 tablet 4    fluticasone propionate (FLONASE) 50 mcg/actuation nasal spray SHAKE LIQUID AND USE 1 SPRAY IN EACH NOSTRIL EVERY DAY 48 g 3    furosemide (LASIX) 40 MG tablet Take 1 tablet (40 mg total) by mouth once daily. 90 tablet 9    gabapentin (NEURONTIN) 800 MG tablet TAKE 1 TABLET(800 MG) BY MOUTH THREE TIMES DAILY 90 tablet 11    HYDROcodone-acetaminophen (NORCO) 7.5-325 mg per tablet Take 1 tablet by mouth every 12 (twelve) hours as needed for Pain. Medical necessary for greater than 7 days for chronic pain. 60 tablet 0    hydrOXYzine HCL (ATARAX) 50 MG tablet Take 1 tablet (50 mg total) by mouth nightly as needed for Itching. 90 tablet 2    Lactobacillus acidophilus 1 billion cell Cap Take 1 capsule by mouth 3 (three) times daily with meals. 60 capsule 0    lactulose (CHRONULAC) 20 gram/30 mL Soln Take 15 mLs (10 g total) by mouth once daily. 450 mL 2    lancets Misc To check BG 2 times daily, to use with insurance preferred meter 100 each 5    LIDOcaine (LIDODERM) 5 % APPLY PATCH ONTO THE SKIN. REMOVE AND DISCARD PATCH WITHIN 12 HOURS OR AS DIRECTED BY MD 30 patch 3    lisinopriL (PRINIVIL,ZESTRIL) 20 MG tablet Take 1 tablet (20 mg total) by mouth once daily. 90 tablet 3    melatonin 10 mg Tab Take 10 mg by mouth nightly.      metFORMIN (GLUCOPHAGE) 500 MG tablet TAKE 1 TABLET EVERY DAY WITH BREAKFAST 90 tablet 3    multivitamin (THERAGRAN) tablet Take 1 tablet by mouth once daily.      nystatin (NYSTOP) powder APPLY TOPICALLY TO THE AFFECTED AREA TWICE DAILY 120 g 11    nystatin-triamcinolone (MYCOLOG II) cream Apply topically 3 (three) times  "daily. Apply to affected area 30 g 1    ondansetron (ZOFRAN-ODT) 8 MG TbDL Take 1 tablet (8 mg total) by mouth every 12 (twelve) hours as needed. 40 tablet 3    potassium chloride SA (K-DUR,KLOR-CON) 20 MEQ tablet Take 1 tablet (20 mEq total) by mouth once daily. 90 tablet 3    spironolactone (ALDACTONE) 25 MG tablet Take 1 tablet (25 mg total) by mouth 3 (three) times a week. 90 tablet 4    tirzepatide (MOUNJARO) 2.5 mg/0.5 mL PnIj Inject 2.5 mg into the skin every 7 days. 4 pen 2    TRELEGY ELLIPTA 100-62.5-25 mcg DsDv INHALE 1 PUFF EVERY DAY. EXPIRES 42 DAYS AFTER OPENING FOIL TRAY 90 each 3       ROS: As per HPI and below:  Pertinent items are noted in HPI.      Physical Exam:     Vitals:    24 0905 24 1127 24 1353 24 1356   BP: 120/70 (!) 106/53     BP Location: Right arm Right arm     Patient Position: Lying Lying     Pulse:  69 69 69   Resp:  18 18 18   Temp:  97.2 °F (36.2 °C)     TempSrc:  Oral     SpO2:  95%  95%   Weight:       Height:           BP  Min: 91/51  Max: 145/87  Temp  Av.3 °F (36.8 °C)  Min: 97.2 °F (36.2 °C)  Max: 100.1 °F (37.8 °C)  Pulse  Av.3  Min: 64  Max: 110  Resp  Av.9  Min: 14  Max: 26  SpO2  Av.9 %  Min: 92 %  Max: 97 %  Height  Av' 8" (172.7 cm)  Min: 5' 8" (172.7 cm)  Max: 5' 8" (172.7 cm)  Weight  Av.8 kg (350 lb)  Min: 158.8 kg (350 lb)  Max: 158.8 kg (350 lb)    Body mass index is 53.22 kg/m².          General:             Well developed, well nourished, no apparent distress  HEENT:              NCAT, no JVD, mucous membranes moist, EOM intact  Cardiovascular:  Regular rate and rhythm, normal S1, normal S2, No murmurs, rubs, or gallops  Respiratory:        Normal breath sounds, no wheezes, no rales, no rhonchi  Abdomen:           Bowel sounds present, non tender, non distended, no masses, no hepatojugular reflux  Extremities:        No clubbing, no cyanosis, no edema  Vascular:            2+ b/l radial.  Peripheral pulses " intact.  No carotid bruits.  Neurological:      No focal deficits  Skin:                   No obvious rashes or erythema, ope wound of the left abdomen, left thigh and MAD of the buttocks BL                Lab Results   Component Value Date    WBC 18.11 (H) 04/22/2024    HGB 13.2 04/22/2024    HCT 41.2 04/22/2024    MCV 94 04/22/2024     04/22/2024     Lab Results   Component Value Date    CHOL 89 (L) 03/25/2022    CHOL 100 (L) 07/20/2021    CHOL 100 (L) 03/10/2020     Lab Results   Component Value Date    HDL 37 (L) 03/25/2022    HDL 42 07/20/2021    HDL 48 03/10/2020     Lab Results   Component Value Date    LDLCALC 41.6 (L) 03/25/2022    LDLCALC 49.4 (L) 07/20/2021    LDLCALC 37.6 (L) 03/10/2020     Lab Results   Component Value Date    TRIG 52 03/25/2022    TRIG 43 07/20/2021    TRIG 72 03/10/2020     Lab Results   Component Value Date    CHOLHDL 41.6 03/25/2022    CHOLHDL 42.0 07/20/2021    CHOLHDL 48.0 03/10/2020     CMP  Recent Labs   Lab 04/22/24  0421      CALCIUM 9.6   *   K 3.5   CO2 26   CL 96   BUN 18   CREATININE 0.7      Lab Results   Component Value Date    TSH 1.309 01/11/2021             Assessment and Recommendations       Diagnoses:    1. MAD of the BL buttock  2. Left thigh open wound  3. Left abdomen open wound    Plan:  1. Triad to the BL buttocks q shift  2. AqAg to the abdomen wond, cover with border gauze  3. Agag to the left thigh/buttock wound QOD and PRN    Complexity:    moderate

## 2024-04-22 NOTE — PLAN OF CARE
04/21/24 2015   Patient Assessment/Suction   Level of Consciousness (AVPU) alert   Respiratory Effort Normal;Unlabored   Expansion/Accessory Muscles/Retractions expansion symmetric   All Lung Fields Breath Sounds diminished   Rhythm/Pattern, Respiratory shallow   Cough Frequency infrequent   Cough Type nonproductive   PRE-TX-O2   Device (Oxygen Therapy) nasal cannula   Flow (L/min) 2   SpO2 (!) 93 %   Pulse Oximetry Type Intermittent   Pulse 77   Resp 20   Aerosol Therapy   $ Aerosol Therapy Charges Aerosol Treatment   Respiratory Treatment Status (SVN) given   Treatment Route (SVN) mask   Patient Position (SVN) HOB elevated   Post Treatment Assessment (SVN) breath sounds unchanged   Signs of Intolerance (SVN) none   Breath Sounds Post-Respiratory Treatment   Post-treatment Heart Rate (beats/min) 78   Post-treatment Resp Rate (breaths/min) 20   Tobacco Cessation Intervention   Do you use any type of tobacco product? No   Respiratory Evaluation   $ Care Plan Tech Time 15 min   Evaluation For New Orders   Admitting Diagnosis Sepsis   Cardiac Diagnosis a-fib, CHF, HTN   Pulmonary Diagnosis asthma, COPD   Home Oxygen   Has Home Oxygen? Yes   Liter Flow 2   Duration continuous   Route nasal cannula   Mode continuous   Device home concentrator with portable unit   Home Aerosol, MDI, DPI, and Other Treatments/Therapies   Home Respiratory Therapy Per Patient/Review of Chart Yes   Aerosol Home Meds/Freq duoneb q6prn, budesonide 0.25mg qday   MDI Home Meds/Freq albuterol 2 puffs q6prn   Oxygen Care Plan   Oxygen Care Plan Per Protocol   SPO2 Goal (%) 92% non-cardiac   Rationale Maintain Home oxygen   Bronchodilator Care Plan   Bronchodilator Care Plan Aerosol   Aerosol Meds w/ frequency Albuterol - Ipratropium (DUO-NEB) 0.5mg-3mg(2.5ml) 3ml Nebulizer Solution2.5mg TID   Other Bronchodilator budesonide 0.25mg qday   Atelectasis Care Plan   Rationale No Rational Found   Airway Clearance Care Plan   Rationale No rationale  found

## 2024-04-22 NOTE — SUBJECTIVE & OBJECTIVE
Current Facility-Administered Medications   Medication Dose Route Frequency Provider Last Rate Last Admin    acetaminophen tablet 650 mg  650 mg Oral Q4H PRN Evelyn Townsend DNP   650 mg at 04/22/24 0304    albuterol-ipratropium 2.5 mg-0.5 mg/3 mL nebulizer solution 3 mL  3 mL Nebulization Q6H WAKE Evelyn Townsend DNP   3 mL at 04/21/24 2015    aluminum-magnesium hydroxide-simethicone 200-200-20 mg/5 mL suspension 30 mL  30 mL Oral QID PRN Evelyn Townsend DNP        apixaban tablet 5 mg  5 mg Oral BID Evelyn Townsend DNP   5 mg at 04/21/24 2013    atorvastatin tablet 10 mg  10 mg Oral Daily Evelyn Townsend DNP   10 mg at 04/21/24 1042    budesonide nebulizer solution 0.25 mg  0.25 mg Nebulization Daily Evelyn Townsend DNP   0.25 mg at 04/21/24 0702    cefTRIAXone (Rocephin) 1 g in dextrose 5 % in water (D5W) 100 mL IVPB (MB+)  1 g Intravenous Q24H Evelyn Townsend DNP   Stopped at 04/22/24 0303    dextrose 10% bolus 125 mL 125 mL  12.5 g Intravenous PRN Evelyn Townsend DNP        dextrose 10% bolus 250 mL 250 mL  25 g Intravenous PRN Evelyn Townsend DNP        furosemide tablet 40 mg  40 mg Oral Daily Evelyn Townsend DNP   40 mg at 04/21/24 1042    gabapentin capsule 200 mg  200 mg Oral TID Evelyn Townsend DNP   200 mg at 04/21/24 2013    glucagon (human recombinant) injection 1 mg  1 mg Intramuscular PRN Evelyn Townsend DNP        glucose chewable tablet 16 g  16 g Oral PRN Evelyn Townsend DNP        glucose chewable tablet 24 g  24 g Oral PRN Evelyn Townsend DNP        insulin aspart U-100 pen 0-5 Units  0-5 Units Subcutaneous QID (AC + HS) PRN Evelyn Townsend DNP        lisinopriL tablet 20 mg  20 mg Oral Daily Evelyn Townsend DNP   20 mg at 04/21/24 1042    magnesium oxide tablet 800 mg  800 mg Oral PRN Evelyn Townsend DNP        magnesium oxide tablet 800 mg  800 mg Oral PRN Evelyn Townsend DNP        melatonin tablet 9 mg  9 mg Oral Nightly PRN Evelyn Townsend DNP   9 mg at 04/21/24 2039     morphine injection 2 mg  2 mg Intravenous Q4H PRN Evelyn Townsend DNP        naloxone 0.4 mg/mL injection 0.02 mg  0.02 mg Intravenous PRN Evelyn Townsend DNP        ondansetron injection 4 mg  4 mg Intravenous Q6H PRN Evelyn Townsend DNP        ondansetron injection 4 mg  4 mg Intravenous Q6H PRN Evelyn Townsend DNP        piperacillin-tazobactam (ZOSYN) 4.5 g in dextrose 5 % in water (D5W) 100 mL IVPB (MB+)  4.5 g Intravenous Q8H Evelyn Townsend DNP 25 mL/hr at 04/22/24 0348 4.5 g at 04/22/24 0348    potassium bicarbonate disintegrating tablet 35 mEq  35 mEq Oral PRN Evelyn Townsend DNP        potassium bicarbonate disintegrating tablet 50 mEq  50 mEq Oral PRN Evelyn Townsend DNP   50 mEq at 04/22/24 0624    potassium bicarbonate disintegrating tablet 60 mEq  60 mEq Oral PRN Evelyn Townsend DNP        senna-docusate 8.6-50 mg per tablet 1 tablet  1 tablet Oral BID PRN Evelyn Townsend DNP        sodium chloride 0.9% flush 10 mL  10 mL Intravenous Q12H PRN Evelyn Townsend DNP        spironolactone tablet 25 mg  25 mg Oral Once per day on Monday Wednesday Friday Evelyn Townsend DNP           Review of patient's allergies indicates:   Allergen Reactions    Dilaudid [hydromorphone] Anaphylaxis     Other reaction(s): Anaphylaxis  Other reaction(s): Unknown    Zyvox [linezolid in dextrose 5%] Shortness Of Breath       Past Medical History:   Diagnosis Date    *Atrial flutter     Angina pectoris 9/18/2017    Anxiety     Arthritis     Asthma     Atrial fibrillation     Back pain     Cataract     OD    Chest pain 9/17/2017    CHF (congestive heart failure)     Chronically on benzodiazepine therapy 5/4/2019    COPD (chronic obstructive pulmonary disease)     Depression     Diabetes mellitus     Emphysema of lung     Heart failure     Hepatomegaly 2/3/2016    Hernia     History of MI (myocardial infarction) 1/19/2016    Hypercapnic respiratory failure, chronic 11/16/2016    Hyperlipidemia     Hypertension     Iron  deficiency anemia 2/3/2016    Myocardial infarction     Obesity     Peripheral vascular disease     Pneumonia     Polyneuropathy     Retinal detachment     OS    Septic shock 2017    Skin ulcer 3/18/2017    Tobacco dependence     Type II 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, not stated as uncontrolled(250.60)      Past Surgical History:   Procedure Laterality Date    BREAST BIOPSY Left 10 yrs ago    benign    CATARACT EXTRACTION Left     OS     CATARACT EXTRACTION W/  INTRAOCULAR LENS IMPLANT Right 2023    Procedure: CEIOL OD;  Surgeon: David Bautista MD;  Location: [facility];  Service: Ophthalmology;  Laterality: Right;     SECTION      x2    EYE SURGERY      HYSTERECTOMY      partial    OOPHORECTOMY      one ovary conserved    RETINAL DETACHMENT SURGERY      buckle --OS    TONSILLECTOMY       Family History       Problem Relation (Age of Onset)    Alcohol abuse Mother    Arrhythmia Father    Arthritis Father, Sister    Blindness Son    Cancer Brother    Cirrhosis Mother    Coronary artery disease Father, Sister    Crohn's disease Sister    Early death Sister (30)    Heart attack Father, Sister    Heart disease Father, Sister (32), Sister    Lung cancer Brother    No Known Problems Brother, Daughter          Tobacco Use    Smoking status: Former     Current packs/day: 0.00     Average packs/day: 0.3 packs/day for 54.4 years (16.3 ttl pk-yrs)     Types: Cigarettes     Start date: 1968     Quit date: 2022     Years since quittin.8    Smokeless tobacco: Never   Substance and Sexual Activity    Alcohol use: No     Alcohol/week: 0.0 standard drinks of alcohol    Drug use: No    Sexual activity: Not Currently     Review of Systems   Constitutional:  Negative for activity change, appetite change, chills, diaphoresis, fatigue and fever.   Respiratory:  Negative for cough, shortness of breath and wheezing.    Cardiovascular:  Negative for chest pain and  leg swelling.   Gastrointestinal:  Positive for abdominal distention. Negative for abdominal pain, constipation, nausea and vomiting.   Genitourinary:  Negative for difficulty urinating.   Neurological:  Negative for dizziness, light-headedness and headaches.   All other systems reviewed and are negative.    Objective:     Vital Signs (Most Recent):  Temp: 98.1 °F (36.7 °C) (04/22/24 0723)  Pulse: 64 (04/22/24 0723)  Resp: 18 (04/22/24 0723)  BP: (!) 94/45 (04/22/24 0723)  SpO2: (!) 94 % (04/22/24 0723) Vital Signs (24h Range):  Temp:  [97.6 °F (36.4 °C)-98.3 °F (36.8 °C)] 98.1 °F (36.7 °C)  Pulse:  [] 64  Resp:  [17-22] 18  SpO2:  [93 %-97 %] 94 %  BP: ()/(45-55) 94/45     Weight: (!) 158.8 kg (350 lb)  Body mass index is 53.22 kg/m².     Physical Exam  Vitals and nursing note reviewed.   Constitutional:       Appearance: She is obese. She is not ill-appearing.   Cardiovascular:      Rate and Rhythm: Tachycardia present.      Pulses: Normal pulses.   Pulmonary:      Effort: Pulmonary effort is normal. No respiratory distress.      Breath sounds: Normal breath sounds. No wheezing.   Abdominal:      General: Bowel sounds are normal. There is no distension.      Palpations: There is no mass.      Tenderness: There is no abdominal tenderness. There is no rebound.      Hernia: A hernia is present.      Comments: Large lateral hernia, larger than her abdomen. With overlying erythema.  No obvious pus   Musculoskeletal:      Right lower leg: No edema.      Left lower leg: No edema.   Skin:     General: Skin is warm.      Capillary Refill: Capillary refill takes less than 2 seconds.      Findings: Erythema and wound (LLQ dressing cdi) present.      Comments: LLQ abdominal wound; possible cellulitis. Dsg CDI.   Neurological:      Mental Status: She is alert and oriented to person, place, and time. Mental status is at baseline.            I have reviewed all pertinent lab results within the past 24 hours.  CBC:    Recent Labs   Lab 04/22/24  0421   WBC 18.11*   RBC 4.37   HGB 13.2   HCT 41.2      MCV 94   MCH 30.2   MCHC 32.0     CMP:   Recent Labs   Lab 04/21/24  0240 04/22/24 0421   * 107   CALCIUM 10.4 9.6   ALBUMIN 3.8  --    PROT 7.7  --     133*   K 4.9 3.5   CO2 24 26   CL 96 96   BUN 14 18   CREATININE 0.7 0.7   ALKPHOS 100  --    ALT 19  --    AST 26  --    BILITOT 0.4  --        Significant Diagnostics:  I have reviewed all pertinent imaging results/findings within the past 24 hours.

## 2024-04-23 LAB
ALBUMIN SERPL BCP-MCNC: 2.9 G/DL (ref 3.5–5.2)
ALP SERPL-CCNC: 64 U/L (ref 55–135)
ALT SERPL W/O P-5'-P-CCNC: 13 U/L (ref 10–44)
ANION GAP SERPL CALC-SCNC: 10 MMOL/L (ref 8–16)
AST SERPL-CCNC: 21 U/L (ref 10–40)
BASOPHILS # BLD AUTO: 0.03 K/UL (ref 0–0.2)
BASOPHILS NFR BLD: 0.4 % (ref 0–1.9)
BILIRUB SERPL-MCNC: 0.5 MG/DL (ref 0.1–1)
BUN SERPL-MCNC: 10 MG/DL (ref 8–23)
CALCIUM SERPL-MCNC: 9.6 MG/DL (ref 8.7–10.5)
CHLORIDE SERPL-SCNC: 96 MMOL/L (ref 95–110)
CK SERPL-CCNC: 65 U/L (ref 20–180)
CO2 SERPL-SCNC: 26 MMOL/L (ref 23–29)
CREAT SERPL-MCNC: 0.6 MG/DL (ref 0.5–1.4)
CRP SERPL-MCNC: 154.1 MG/L (ref 0–8.2)
DIFFERENTIAL METHOD BLD: ABNORMAL
EOSINOPHIL # BLD AUTO: 0.2 K/UL (ref 0–0.5)
EOSINOPHIL NFR BLD: 3 % (ref 0–8)
ERYTHROCYTE [DISTWIDTH] IN BLOOD BY AUTOMATED COUNT: 14.4 % (ref 11.5–14.5)
ERYTHROCYTE [SEDIMENTATION RATE] IN BLOOD BY WESTERGREN METHOD: 45 MM/HR (ref 0–20)
EST. GFR  (NO RACE VARIABLE): >60 ML/MIN/1.73 M^2
GLUCOSE SERPL-MCNC: 87 MG/DL (ref 70–110)
HCT VFR BLD AUTO: 42.4 % (ref 37–48.5)
HGB BLD-MCNC: 13.3 G/DL (ref 12–16)
IMM GRANULOCYTES # BLD AUTO: 0.02 K/UL (ref 0–0.04)
IMM GRANULOCYTES NFR BLD AUTO: 0.3 % (ref 0–0.5)
LYMPHOCYTES # BLD AUTO: 1.3 K/UL (ref 1–4.8)
LYMPHOCYTES NFR BLD: 18.4 % (ref 18–48)
MAGNESIUM SERPL-MCNC: 1.8 MG/DL (ref 1.6–2.6)
MCH RBC QN AUTO: 30.2 PG (ref 27–31)
MCHC RBC AUTO-ENTMCNC: 31.4 G/DL (ref 32–36)
MCV RBC AUTO: 96 FL (ref 82–98)
MONOCYTES # BLD AUTO: 0.9 K/UL (ref 0.3–1)
MONOCYTES NFR BLD: 12.9 % (ref 4–15)
NEUTROPHILS # BLD AUTO: 4.7 K/UL (ref 1.8–7.7)
NEUTROPHILS NFR BLD: 65 % (ref 38–73)
NRBC BLD-RTO: 0 /100 WBC
PLATELET # BLD AUTO: 204 K/UL (ref 150–450)
PMV BLD AUTO: 10.5 FL (ref 9.2–12.9)
POCT GLUCOSE: 106 MG/DL (ref 70–110)
POCT GLUCOSE: 109 MG/DL (ref 70–110)
POCT GLUCOSE: 113 MG/DL (ref 70–110)
POCT GLUCOSE: 95 MG/DL (ref 70–110)
POTASSIUM SERPL-SCNC: 4.2 MMOL/L (ref 3.5–5.1)
PROCALCITONIN SERPL IA-MCNC: 0.99 NG/ML (ref 0–0.5)
PROT SERPL-MCNC: 6.5 G/DL (ref 6–8.4)
RBC # BLD AUTO: 4.4 M/UL (ref 4–5.4)
SODIUM SERPL-SCNC: 132 MMOL/L (ref 136–145)
VANCOMYCIN TROUGH SERPL-MCNC: 12.6 UG/ML (ref 10–22)
WBC # BLD AUTO: 7.23 K/UL (ref 3.9–12.7)

## 2024-04-23 PROCEDURE — 84145 PROCALCITONIN (PCT): CPT | Performed by: INTERNAL MEDICINE

## 2024-04-23 PROCEDURE — 25000242 PHARM REV CODE 250 ALT 637 W/ HCPCS

## 2024-04-23 PROCEDURE — 63600175 PHARM REV CODE 636 W HCPCS: Performed by: INTERNAL MEDICINE

## 2024-04-23 PROCEDURE — 82550 ASSAY OF CK (CPK): CPT | Performed by: STUDENT IN AN ORGANIZED HEALTH CARE EDUCATION/TRAINING PROGRAM

## 2024-04-23 PROCEDURE — 94761 N-INVAS EAR/PLS OXIMETRY MLT: CPT

## 2024-04-23 PROCEDURE — 11000001 HC ACUTE MED/SURG PRIVATE ROOM

## 2024-04-23 PROCEDURE — 83735 ASSAY OF MAGNESIUM: CPT | Performed by: INTERNAL MEDICINE

## 2024-04-23 PROCEDURE — 36415 COLL VENOUS BLD VENIPUNCTURE: CPT | Performed by: INTERNAL MEDICINE

## 2024-04-23 PROCEDURE — 94640 AIRWAY INHALATION TREATMENT: CPT

## 2024-04-23 PROCEDURE — 85025 COMPLETE CBC W/AUTO DIFF WBC: CPT

## 2024-04-23 PROCEDURE — 25000003 PHARM REV CODE 250: Performed by: INTERNAL MEDICINE

## 2024-04-23 PROCEDURE — 80053 COMPREHEN METABOLIC PANEL: CPT | Performed by: INTERNAL MEDICINE

## 2024-04-23 PROCEDURE — 99233 SBSQ HOSP IP/OBS HIGH 50: CPT | Mod: ,,, | Performed by: STUDENT IN AN ORGANIZED HEALTH CARE EDUCATION/TRAINING PROGRAM

## 2024-04-23 PROCEDURE — 99900035 HC TECH TIME PER 15 MIN (STAT)

## 2024-04-23 PROCEDURE — 86140 C-REACTIVE PROTEIN: CPT | Performed by: INTERNAL MEDICINE

## 2024-04-23 PROCEDURE — 85651 RBC SED RATE NONAUTOMATED: CPT | Performed by: INTERNAL MEDICINE

## 2024-04-23 PROCEDURE — 25000003 PHARM REV CODE 250: Performed by: NURSE PRACTITIONER

## 2024-04-23 PROCEDURE — 25000003 PHARM REV CODE 250

## 2024-04-23 PROCEDURE — 27000221 HC OXYGEN, UP TO 24 HOURS

## 2024-04-23 PROCEDURE — 25000003 PHARM REV CODE 250: Performed by: STUDENT IN AN ORGANIZED HEALTH CARE EDUCATION/TRAINING PROGRAM

## 2024-04-23 PROCEDURE — 80202 ASSAY OF VANCOMYCIN: CPT | Performed by: INTERNAL MEDICINE

## 2024-04-23 RX ORDER — DIPHENHYDRAMINE HCL 25 MG
25 CAPSULE ORAL EVERY 6 HOURS PRN
Status: DISCONTINUED | OUTPATIENT
Start: 2024-04-23 | End: 2024-04-24 | Stop reason: HOSPADM

## 2024-04-23 RX ORDER — MUPIROCIN 20 MG/G
OINTMENT TOPICAL 2 TIMES DAILY
Status: DISCONTINUED | OUTPATIENT
Start: 2024-04-23 | End: 2024-04-24 | Stop reason: HOSPADM

## 2024-04-23 RX ADMIN — Medication 800 MG: at 06:04

## 2024-04-23 RX ADMIN — CHLORHEXIDINE GLUCONATE 0.12% ORAL RINSE 15 ML: 1.2 LIQUID ORAL at 08:04

## 2024-04-23 RX ADMIN — IPRATROPIUM BROMIDE AND ALBUTEROL SULFATE 3 ML: 2.5; .5 SOLUTION RESPIRATORY (INHALATION) at 01:04

## 2024-04-23 RX ADMIN — ACETAMINOPHEN 650 MG: 325 TABLET ORAL at 03:04

## 2024-04-23 RX ADMIN — CHLORHEXIDINE GLUCONATE 0.12% ORAL RINSE 15 ML: 1.2 LIQUID ORAL at 09:04

## 2024-04-23 RX ADMIN — GABAPENTIN 200 MG: 100 CAPSULE ORAL at 08:04

## 2024-04-23 RX ADMIN — MUPIROCIN 1 G: 20 OINTMENT TOPICAL at 01:04

## 2024-04-23 RX ADMIN — VANCOMYCIN HYDROCHLORIDE 1250 MG: 1.25 INJECTION, POWDER, LYOPHILIZED, FOR SOLUTION INTRAVENOUS at 01:04

## 2024-04-23 RX ADMIN — MELATONIN TAB 3 MG 9 MG: 3 TAB at 10:04

## 2024-04-23 RX ADMIN — DIPHENHYDRAMINE HYDROCHLORIDE 25 MG: 25 CAPSULE ORAL at 10:04

## 2024-04-23 RX ADMIN — GABAPENTIN 200 MG: 100 CAPSULE ORAL at 01:04

## 2024-04-23 RX ADMIN — IPRATROPIUM BROMIDE AND ALBUTEROL SULFATE 3 ML: 2.5; .5 SOLUTION RESPIRATORY (INHALATION) at 07:04

## 2024-04-23 RX ADMIN — BUDESONIDE 0.25 MG: 0.25 SUSPENSION RESPIRATORY (INHALATION) at 08:04

## 2024-04-23 RX ADMIN — ACETAMINOPHEN 650 MG: 325 TABLET ORAL at 01:04

## 2024-04-23 RX ADMIN — HYPROMELLOSE 2910 1 DROP: 5 SOLUTION/ DROPS OPHTHALMIC at 08:04

## 2024-04-23 RX ADMIN — DIPHENHYDRAMINE HYDROCHLORIDE 25 MG: 25 CAPSULE ORAL at 04:04

## 2024-04-23 RX ADMIN — IPRATROPIUM BROMIDE AND ALBUTEROL SULFATE 3 ML: 2.5; .5 SOLUTION RESPIRATORY (INHALATION) at 08:04

## 2024-04-23 RX ADMIN — LISINOPRIL 20 MG: 10 TABLET ORAL at 09:04

## 2024-04-23 RX ADMIN — MUPIROCIN 1 G: 20 OINTMENT TOPICAL at 08:04

## 2024-04-23 RX ADMIN — DIPHENHYDRAMINE HYDROCHLORIDE 25 MG: 25 CAPSULE ORAL at 09:04

## 2024-04-23 RX ADMIN — GABAPENTIN 200 MG: 100 CAPSULE ORAL at 09:04

## 2024-04-23 RX ADMIN — FUROSEMIDE 40 MG: 40 TABLET ORAL at 09:04

## 2024-04-23 RX ADMIN — ATORVASTATIN CALCIUM 10 MG: 10 TABLET, FILM COATED ORAL at 09:04

## 2024-04-23 RX ADMIN — APIXABAN 5 MG: 2.5 TABLET, FILM COATED ORAL at 08:04

## 2024-04-23 RX ADMIN — APIXABAN 5 MG: 2.5 TABLET, FILM COATED ORAL at 09:04

## 2024-04-23 RX ADMIN — DIPHENHYDRAMINE HYDROCHLORIDE, ZINC ACETATE: 2; .1 CREAM TOPICAL at 08:04

## 2024-04-23 RX ADMIN — ACETAMINOPHEN 650 MG: 325 TABLET ORAL at 08:04

## 2024-04-23 NOTE — CARE UPDATE
04/22/24 2049   Patient Assessment/Suction   Level of Consciousness (AVPU) alert   Respiratory Effort Unlabored   Expansion/Accessory Muscles/Retractions expansion symmetric;no use of accessory muscles   All Lung Fields Breath Sounds diminished   Rhythm/Pattern, Respiratory unlabored;shortness of breath   Cough Frequency no cough   PRE-TX-O2   Device (Oxygen Therapy) nasal cannula   Flow (L/min) 2   SpO2 97 %   Pulse Oximetry Type Intermittent   Pulse 78   Resp 18   Aerosol Therapy   $ Aerosol Therapy Charges Aerosol Treatment   Daily Review of Necessity (SVN) completed   Respiratory Treatment Status (SVN) given   Treatment Route (SVN) mask   Patient Position (SVN) HOB elevated   Post Treatment Assessment (SVN) breath sounds unchanged   Signs of Intolerance (SVN) none   Breath Sounds Post-Respiratory Treatment   Throughout All Fields Post-Treatment All Fields   Throughout All Fields Post-Treatment Anterior:   Post-treatment Heart Rate (beats/min) 75   Post-treatment Resp Rate (breaths/min) 18

## 2024-04-23 NOTE — PROGRESS NOTES
"[facility]   Department of Infectious Disease  Progress Note        PATIENT NAME: Nelly Tian  MRN: [id number]  TODAY'S DATE: 04/23/2024  ADMIT DATE: 4/21/2024  LOS: 2 days    CHIEF COMPLAINT: Wound Infection (sore on left lower abdomen with foul drainage)    PRINCIPLE PROBLEM: Severe sepsis    INTERVAL HISTORY      4/23:  Patient seen and examined, she is lying in bed, scratching her face and neck, reports the oxygen cannula is very itchy and is causing her allergy.  Hemodynamically stable, afebrile.  Adequate urinary output, had a bowel movement yesterday.  Labs reviewed, white count normal 7.2, no left shift, H&H 13.3/42.4, platelet count 204.  Sed rate 45, hyponatremia 132, normal kidney function, normal LFTs, .1, baseline CPK 65.  Procalcitonin 0.99.  MRSA nasal swab detected.  Blood cultures x2 sets no growth to date, pending final.    Antibiotics (From admission, onward)      Start     Stop Route Frequency Ordered    04/23/24 0230  vancomycin 1,250 mg in dextrose 5 % (D5W) 250 mL IVPB (Vial-Mate)         -- IV Every 12 hours (non-standard times) 04/22/24 1306    04/22/24 1350  vancomycin - pharmacy to dose  (vancomycin IVPB (PEDS and ADULTS))        Placed in "And" Linked Group    -- IV pharmacy to manage frequency 04/22/24 1251          Antifungals (From admission, onward)      None           Antivirals (From admission, onward)      None            ASSESSMENT and PLAN     Sepsis secondary to large anterior abdominal cellulitis/SSTI in the setting of prior trauma and self-picking behavior - and unrepaired large ventral hernia  Blood cultures x2 sets 4/21 no growth to date, pending final  Procalcitonin 0.99, positive   .1, sed rate 45   Baseline CPK 65  MRSA nasal swab detected     PMHx:  AFib/a flutter on Eliquis, CHF, COPD, diabetes     Obesity, BMI 53.2 kg/m2        RECOMMENDATIONS:   Continue vancomycin IV, keep level 15-20 while inpatient  Follow " cultures  Anticipating discharge on daptomycin IV for 7-10 days for anterior abdominal wall cellulitis   Needs follow-up ID clinic/Dr. Juan in 2-3 weeks  Mupirocin both nares twice a day for 5 days  PT/OT as tolerated  Aspiration precautions  Contact precautions     D/w patient, nursing    Please send Epic secure chat with any questions.      SUBJECTIVE    Review of Systems  Negative except as stated above in Interval History     OBJECTIVE   Temp:  [97.2 °F (36.2 °C)-98.2 °F (36.8 °C)] 97.6 °F (36.4 °C)  Pulse:  [61-82] 63  Resp:  [18] 18  SpO2:  [91 %-97 %] 91 %  BP: (106-124)/(49-74) 124/59  Temp:  [97.2 °F (36.2 °C)-98.2 °F (36.8 °C)]   Temp: 97.6 °F (36.4 °C) (04/23/24 0758)  Pulse: 63 (04/23/24 0800)  Resp: 18 (04/23/24 0800)  BP: (!) 124/59 (04/23/24 0758)  SpO2: (!) 91 % (04/23/24 0800)    Intake/Output Summary (Last 24 hours) at 4/23/2024 0846  Last data filed at 4/23/2024 0624  Gross per 24 hour   Intake 2665.1 ml   Output 950 ml   Net 1715.1 ml       Physical Exam  General:  Older obese lady, lying in bed, breathing comfortable on room air  Eyes: Eyes with no icterus or injection. Vision grossly normal  Ears: Hearing grossly normal.  Nose: Nares patent  Mouth: Moist mucous membranes, dentition is very 4, good dental hygiene. No ulcerations, erythema or exudates.  Neck: Supple, no tenderness to palpation.  Cardiovascular: Regular rate and rhythm, no murmurs, no edema.    Respiratory:  Clear to auscultation bilaterally, no tachypnea or increased work of breathing.  Gastrointestinal:  Soft with active bowel sounds, no tenderness to palpation, no distention.  Genitourinary:  No suprapubic tenderness.  Musculoskeletal:  Moves all extremities with good strength.  Fingernails are slightly  hyperemic, nontender to palpation   Skin:  Warm, face is flushed, she has been scratching her face and neck.  impressive left lower abdominal panniculitis, with resolving redness, dressing in place, triad applied yesterday.   "4/22: impressive left large anterior abdominal wall cellulitis with multiple excoriated areas, one of them bleeding, others healing,  no active evidence of purulent drainage noted, tender to palpation consistent with ongoing cellulitis, see picture below.    Neuro:   Oriented, conversant, follows commands.  Psych: Good mood, normal affect.   VAD: PIV  ISOLATION:  Contact/MRSA     Wounds:   4/22:    4/21:           Significant Labs: All pertinent labs within the past 24 hours have been reviewed.    CBC LAST 7 DAYS  Recent Labs   Lab 04/21/24  0240 04/22/24 0421 04/23/24 0427   WBC 13.92* 18.11* 7.23   RBC 4.89 4.37 4.40   HGB 15.1 13.2 13.3   HCT 48.4 41.2 42.4   MCV 99* 94 96   MCH 30.9 30.2 30.2   MCHC 31.2* 32.0 31.4*   RDW 14.0 14.6* 14.4    201 204   MPV 9.9 10.4 10.5   GRAN 78.1*  10.9* 77.4*  14.0* 65.0  4.7   LYMPH 9.6*  1.3 10.9*  2.0 18.4  1.3   MONO 8.5  1.2* 10.3  1.9* 12.9  0.9   BASO 0.04 0.05 0.03   NRBC 0 0 0       CHEMISTRY LAST 7 DAYS  Recent Labs   Lab 04/21/24  0230 04/21/24 0240 04/22/24 0421 04/23/24 0427   NA  --  136 133* 132*   K  --  4.9 3.5 4.2   CL  --  96 96 96   CO2  --  24 26 26   ANIONGAP  --  16 11 10   BUN  --  14 18 10   CREATININE  --  0.7 0.7 0.6   GLU  --  119* 107 87   CALCIUM  --  10.4 9.6 9.6   PH 7.484*  --   --   --    MG  --  1.8 2.1 1.8   ALBUMIN  --  3.8  --  2.9*   PROT  --  7.7  --  6.5   ALKPHOS  --  100  --  64   ALT  --  19  --  13   AST  --  26  --  21   BILITOT  --  0.4  --  0.5       Estimated Creatinine Clearance: 136.5 mL/min (based on SCr of 0.6 mg/dL).    INFLAMMATORY/PROCAL  LAST 7 DAYS  Recent Labs   Lab 04/21/24  0240 04/23/24  0427   PROCAL 0.04 0.99*   CRP  --  154.1*     No results found for: "ESR"  CRP   Date Value Ref Range Status   04/23/2024 154.1 (H) 0.0 - 8.2 mg/L Final   06/26/2023 19.7 (H) 0.0 - 8.2 mg/L Final   06/24/2023 76.2 (H) 0.0 - 8.2 mg/L Final   08/18/2022 174.8 (H) 0.0 - 8.2 mg/L Final   05/08/2019 71.4 (H) 0.0 - " 8.2 mg/L Final   05/07/2019 97.6 (H) 0.0 - 8.2 mg/L Final   05/06/2019 139.5 (H) 0.0 - 8.2 mg/L Final       PRIOR  MICROBIOLOGY:    Susceptibility data from last 90 days.  Collected Specimen Info Organism   02/19/24 Blood from Peripheral, Forearm, Left No growth after 5 days.   02/19/24 Blood from Peripheral, Forearm, Right No growth after 5 days.       LAST 7 DAYS MICROBIOLOGY   Microbiology Results (last 7 days)       Procedure Component Value Units Date/Time    MRSA Screen by PCR [[id number]]  (Abnormal) Collected: 04/22/24 1313    Order Status: Completed Specimen: Nasal Swab Updated: 04/22/24 2143     MRSA SCREEN BY PCR Positive     Comment: MRSA PCR critical result(s) called and verbal readback obtained from   Josué Hugo RN at [facility]     by KS3 04/22/2024 21:43         Narrative:      MRSA PCR critical result(s) called and verbal readback obtained from   Josué Hugo RN at [facility]     by KS3 04/22/2024 21:43    Blood culture x two cultures. Draw prior to antibiotics. [[id number]] Collected: 04/21/24 0240    Order Status: Completed Specimen: Blood from Peripheral, Wrist, Left Updated: 04/22/24 1032     Blood Culture, Routine No Growth to date      No Growth to date    Narrative:      Aerobic and anaerobic    Blood culture x two cultures. Draw prior to antibiotics. [[id number]] Collected: 04/21/24 0240    Order Status: Completed Specimen: Blood from Peripheral, Antecubital, Right Updated: 04/22/24 1032     Blood Culture, Routine No Growth to date      No Growth to date    Narrative:      Aerobic and anaerobic    Influenza A & B by Molecular [[id number]] Collected: 04/21/24 0219    Order Status: Completed Specimen: Nasal Swab Updated: 04/21/24 0309     Influenza A, Molecular Negative     Influenza B, Molecular Negative     Flu A & B Source Nasal swab              CURRENT/PREVIOUS VISIT EKG  Results for orders placed or performed during the hospital encounter of 02/19/24   EKG 12-lead    Collection Time: 02/19/24   1:15 AM   Result Value Ref Range    QRS Duration 112 ms    [facility] QTC Calculation 447 ms    Narrative    Test Reason : R00.0,    Vent. Rate : 097 BPM     Atrial Rate : 066 BPM     P-R Int : 000 ms          QRS Dur : 112 ms      QT Int : 352 ms       P-R-T Axes : 000 121 093 degrees     QTc Int : 447 ms    Atrial fibrillation  Right axis deviation  Septal infarct (cited on or before 31-JAN-2023)  Abnormal ECG  When compared with ECG of 22-JUN-2023 14:10,  Atrial fibrillation has replaced Atrial flutter  Confirmed by Sosa ADEN, Prakash LIND ([id number]) on 2/24/2024 6:44:24 AM    Referred By: [email or web address]   SELF           Confirmed By:Prakash Swan MD          Significant Imaging: I have reviewed all relevant and available imaging results/findings within the past 24 hours.    CXR: Cardiomegaly with likely mild pulmonary edema.     Joanie Browning MD  Date of Service: 04/23/2024      This note was created using M Modal voice recognition software that occasionally misinterpreted phrases or words.

## 2024-04-23 NOTE — ASSESSMENT & PLAN NOTE
Patient's FSGs are uncontrolled due to hyperglycemia on current medication regimen.  Last A1c reviewed-   Lab Results   Component Value Date    HGBA1C 6.0 02/19/2024     Most recent fingerstick glucose reviewed-   Recent Labs   Lab 04/22/24  1638 04/22/24  2147 04/23/24  0753 04/23/24  1123   POCTGLUCOSE 112* 77 95 113*       Current correctional scale  Low  Maintain anti-hyperglycemic dose as follows-   Antihyperglycemics (From admission, onward)    Start     Stop Route Frequency Ordered    04/21/24 0609  insulin aspart U-100 pen 0-5 Units         -- SubQ Before meals & nightly PRN 04/21/24 0512        Hold Oral hypoglycemics while patient is in the hospital.

## 2024-04-23 NOTE — HOSPITAL COURSE
Patient got admitted due to sepsis and abdominal wall cellulitis.  Blood cultures ordered.  Patient was initially started on IV Zosyn which was later switched to IV vancomycin.  Surgery was consulted who evaluated the patient and advised conservative management.  Surgery consulted ID.  Nasal MRSA came positive, mupirocin started.  During hospitalization, patient developed facial itching and redness which she said is related to nasal cannula tubing, patient received p.r.n. Benadryl. Patients facial rash and abdominal redness significantly improved during hospitalization. Midline placed on 4/24, and ID advised IV Daptomycin 800 mg daily for 10 days through May 4th then stop. Patient is also advised to get midline out after last dose of IV antibitoic, and blood tests CBC with Diff, CMP, CPK, and CRP every Mondays and Thursdays while on IV antibiotic per ID. Follow up with PCP and Infectious Disease doctor.

## 2024-04-23 NOTE — ASSESSMENT & PLAN NOTE
Hypertension which is uncontrolled.  Latest blood pressure and vitals reviewed-   Temp:  [96.9 °F (36.1 °C)-98.2 °F (36.8 °C)]   Pulse:  [61-82]   Resp:  [18-20]   BP: (107-137)/(49-74)   SpO2:  [91 %-97 %] .   Patient currently off IV antihypertensives.   Home meds for hypertension were reviewed and noted below.   Hypertension Medications               furosemide (LASIX) 40 MG tablet Take 1 tablet (40 mg total) by mouth once daily.    lisinopriL (PRINIVIL,ZESTRIL) 20 MG tablet Take 1 tablet (20 mg total) by mouth once daily.    spironolactone (ALDACTONE) 25 MG tablet Take 1 tablet (25 mg total) by mouth 3 (three) times a week.                Will goal for controlled BP reduction as noted be medications noted above and monitor and mitigate end organ damage as indicated.

## 2024-04-23 NOTE — CONSULTS
Wound care consult completed by Dr. Staples and this wound care nurse. Patients skin is very sensitive to tape and patient complains of pain from the tape on her left lower abdomen. Patient has a large area of cellulitis to her left lower abdomen. There is a patch of skin area that is scabbed over at this time. Patient reports her left lower abdomen was stuck to her chair at home and caused a tear in the skin resulting in wound to her left lower abdomen. Used adhesive remover to remove tape from the patient skin. Abd pad was adhered to the wound and used wound cleanser to moisten the dressing to assist with removal of the dressing. Cleaned area with hibiclens soap and rinsed with wound cleanser then patted dry. Applied a layer of Triad to the affected wound area. Patient requested this area to be covered so applied an abd pad with minimal tape to keep Abd pad in place. Wound care to be done daily. There was no drainage from the wound bed therefore no wound culture could be obtained at this time. Patient with redness to bilateral buttocks from MASD. Patient is also noted with left wrist abrasion.  Rounded at bedside with Dr. Francois on this patient.       Left abdomen area of Redness with wound in the center    Left abdomen    Bilateral buttocks MASD    Left hand

## 2024-04-23 NOTE — NURSING
"Patient reports "itchiness" to facial area. Patient relates this to possibly being "the oxygen tubing." No rash is evident, but some facial redness is noted with patient continuing to show generalized redness. Patient does request "Benadryl." Denies further concerns. At 0337, contact is made with Hospital Medicine provider, DARIEN Bradford, regarding the above information with relevant and pertinent patient information reviewed. Upon contacting and notifying, Benadryl order is noted as entered by the provider.   "

## 2024-04-23 NOTE — PROGRESS NOTES
[facility] Medicine  Progress Note    Patient Name: Nelly Tian  MRN: [id number]  Patient Class: IP- Inpatient   Admission Date: 4/21/2024  Length of Stay: 2 days  Attending Physician: Garrett Mcneal MD  Primary Care Provider: Constantine Bird MD        Subjective:     Principal Problem:Severe sepsis        HPI:  Nelly Tian is a 73 year old female with past medical history of AFib/a flutter on Eliquis, CHF, COPD, diabetes mellitus, recurrent abdominal cellulitis, hypertension, iron-deficiency anemia, hyperlipidemia, MI, anxiety, and arthritis who presents with complaints of left lower abdominal wound with orange/yellowish drainage for over a week. She reports developing the wound after scratching her lower abdomen and her daughter has been performing the wound care with dressing changes. She denies chest pain, shortness of breath, fever, chills, dizziness, lightheadedness, abdominal pain, nausea or vomiting. She presents septic in ED with a T-max 100.1, tachypneic, tachycardic, WBC 13.9, lactic acid 1.91, and urinalysis positive WBCs/leukocytes/bacteria culture pending.  Chest x-ray significant for trace pulmonary vascular congestion. She was started on Rocephin and Zosyn.  Wound care consult placed.  Admitted to hospital medicine for further management and treatment.         Overview/Hospital Course:  Patient got admitted due to sepsis and abdominal wall cellulitis.  Blood cultures ordered.  Patient was initially started on IV Zosyn which was later switched to IV vancomycin.  Surgery was consulted who evaluated the patient and advised conservative management.  Surgery consulted ID.  Nasal MRSA came positive, mupirocin started.  During hospitalization, patient developed facial itching and redness which she said is related to nasal cannula tubing, patient received p.r.n. Benadryl.    Past Medical History:   Diagnosis Date    *Atrial flutter     Angina pectoris 9/18/2017     Anxiety     Arthritis     Asthma     Atrial fibrillation     Back pain     Cataract     OD    Chest pain 2017    CHF (congestive heart failure)     Chronically on benzodiazepine therapy 2019    COPD (chronic obstructive pulmonary disease)     Depression     Diabetes mellitus     Emphysema of lung     Heart failure     Hepatomegaly 2/3/2016    Hernia     History of MI (myocardial infarction) 2016    Hypercapnic respiratory failure, chronic 2016    Hyperlipidemia     Hypertension     Iron deficiency anemia 2/3/2016    Myocardial infarction     Obesity     Peripheral vascular disease     Pneumonia     Polyneuropathy     Retinal detachment     OS    Septic shock 2017    Skin ulcer 3/18/2017    Tobacco dependence     Type II 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, not stated as uncontrolled(250.60)        Past Surgical History:   Procedure Laterality Date    BREAST BIOPSY Left 10 yrs ago    benign    CATARACT EXTRACTION Left     OS     CATARACT EXTRACTION W/  INTRAOCULAR LENS IMPLANT Right 2023    Procedure: CEIOL OD;  Surgeon: David Bautista MD;  Location: [facility] OR;  Service: Ophthalmology;  Laterality: Right;     SECTION      x2    EYE SURGERY      HYSTERECTOMY      partial    OOPHORECTOMY      one ovary conserved    RETINAL DETACHMENT SURGERY      buckle --OS    TONSILLECTOMY         Review of patient's allergies indicates:   Allergen Reactions    Dilaudid [hydromorphone] Anaphylaxis     Other reaction(s): Anaphylaxis  Other reaction(s): Unknown    Zyvox [linezolid in dextrose 5%] Shortness Of Breath       Current Facility-Administered Medications   Medication Dose Route Frequency Provider Last Rate Last Admin    acetaminophen tablet 650 mg  650 mg Oral Q4H PRN Evelyn Townsend DNP   650 mg at 24 1354    albuterol-ipratropium 2.5 mg-0.5 mg/3 mL nebulizer solution 3 mL  3 mL Nebulization Q6H WAKE Evelyn Townsend DNP   3 mL at 24  1349    aluminum-magnesium hydroxide-simethicone 200-200-20 mg/5 mL suspension 30 mL  30 mL Oral QID PRN Evelyn Townsend DNP        apixaban tablet 5 mg  5 mg Oral BID Evelyn Townsend DNP   5 mg at 04/23/24 0933    artificial tears 0.5 % ophthalmic solution 1 drop  1 drop Both Eyes TID PRN Garrett Mcneal MD   1 drop at 04/22/24 2225    atorvastatin tablet 10 mg  10 mg Oral Daily Evelyn Townsend DNP   10 mg at 04/23/24 0933    budesonide nebulizer solution 0.25 mg  0.25 mg Nebulization Daily Evelyn Townsend DNP   0.25 mg at 04/23/24 0800    chlorhexidine 0.12 % solution 15 mL  15 mL Mouth/Throat BID Joanie Kuo MD   15 mL at 04/23/24 0932    dextrose 10% bolus 125 mL 125 mL  12.5 g Intravenous PRN Evelyn Townsend DNP        dextrose 10% bolus 250 mL 250 mL  25 g Intravenous PRN Evelyn Townsend DNP        diphenhydrAMINE capsule 25 mg  25 mg Oral Q6H PRN Martina Antoine FNP   25 mg at 04/23/24 0934    diphenhydrAMINE-zinc acetate 2-0.1% cream   Topical (Top) BID PRN Garrett Mcneal MD        furosemide tablet 40 mg  40 mg Oral Daily Evelyn Townsend DNP   40 mg at 04/23/24 0933    gabapentin capsule 200 mg  200 mg Oral TID Evelyn Townsend DNP   200 mg at 04/23/24 1354    glucagon (human recombinant) injection 1 mg  1 mg Intramuscular PRN Evelyn Townsend DNP        glucose chewable tablet 16 g  16 g Oral PRN Evelyn Townsend DNP        glucose chewable tablet 24 g  24 g Oral PRN Evelyn Townsend DNP        insulin aspart U-100 pen 0-5 Units  0-5 Units Subcutaneous QID (AC + HS) PRN Evelyn Townsend DNP        lisinopriL tablet 20 mg  20 mg Oral Daily Evelyn Townsend DNP   20 mg at 04/23/24 0933    magnesium oxide tablet 800 mg  800 mg Oral PRN Evelyn Townsend DNP   800 mg at 04/23/24 0629    magnesium oxide tablet 800 mg  800 mg Oral PRN Evelyn Townsend DNP        melatonin tablet 9 mg  9 mg Oral Nightly PRN Evelyn Townsend DNP   9 mg at 04/22/24 2141    morphine injection 2 mg  2 mg  Intravenous Q4H PRN Evelyn Townsend DNP        mupirocin 2 % ointment   Topical (Top) BID Joanie Kuo MD   1 g at 04/23/24 1354    naloxone 0.4 mg/mL injection 0.02 mg  0.02 mg Intravenous PRN Evelyn Townsend DNP        ondansetron injection 4 mg  4 mg Intravenous Q6H PRN Evelyn Townsend DNP        ondansetron injection 4 mg  4 mg Intravenous Q6H PRN Evelyn Townsend DNP        potassium bicarbonate disintegrating tablet 35 mEq  35 mEq Oral PRN Evelyn Townsend DNP        potassium bicarbonate disintegrating tablet 50 mEq  50 mEq Oral PRN Evelyn Townsend DNP   50 mEq at 04/22/24 0624    potassium bicarbonate disintegrating tablet 60 mEq  60 mEq Oral PRN Evelyn Townsend DNP        senna-docusate 8.6-50 mg per tablet 1 tablet  1 tablet Oral BID PRN Evelyn Townsend DNP        sodium chloride 0.9% flush 10 mL  10 mL Intravenous Q12H PRN Evelyn Townsend DNP        spironolactone tablet 25 mg  25 mg Oral Once per day on Monday Wednesday Friday Evelyn Townsend DNP   25 mg at 04/22/24 1512    vancomycin - pharmacy to dose   Intravenous pharmacy to manage frequency Garrett Mcneal MD        vancomycin 1,250 mg in dextrose 5 % (D5W) 250 mL IVPB (Vial-Mate)  1,250 mg Intravenous Q12H Garrett Mcneal .7 mL/hr at 04/23/24 1355 1,250 mg at 04/23/24 1355     Family History       Problem Relation (Age of Onset)    Alcohol abuse Mother    Arrhythmia Father    Arthritis Father, Sister    Blindness Son    Cancer Brother    Cirrhosis Mother    Coronary artery disease Father, Sister    Crohn's disease Sister    Early death Sister (30)    Heart attack Father, Sister    Heart disease Father, Sister (32), Sister    Lung cancer Brother    No Known Problems Brother, Daughter          Tobacco Use    Smoking status: Former     Current packs/day: 0.00     Average packs/day: 0.3 packs/day for 54.4 years (16.3 ttl pk-yrs)     Types: Cigarettes     Start date: 1/19/1968     Quit date: 6/30/2022     Years since  quittin.8    Smokeless tobacco: Never   Substance and Sexual Activity    Alcohol use: No     Alcohol/week: 0.0 standard drinks of alcohol    Drug use: No    Sexual activity: Not Currently     Interval History:  Patient reported abdominal redness better than yesterday.  Blood cultures negative so far, on IV vancomycin. Nasal MRSA came positive, mupirocin started. ID and surgery on board. Patient developed facial itching and redness which she said is related to nasal cannula tubing, on p.r.n. Benadryl.      Review of Systems   Constitutional:  Negative for activity change, appetite change, chills, diaphoresis, fatigue and fever.   Respiratory:  Negative for cough, shortness of breath and wheezing.    Cardiovascular:  Negative for chest pain and leg swelling.   Gastrointestinal:  Negative for abdominal pain, constipation, nausea and vomiting.   Genitourinary:  Negative for difficulty urinating.   Skin:         Redness lower abdomen   Neurological:  Negative for dizziness, light-headedness and headaches.     Objective:     Vital Signs (Most Recent):  Temp: 96.9 °F (36.1 °C) (24 1129)  Pulse: 78 (24 1349)  Resp: 20 (24 1349)  BP: 137/63 (24 1129)  SpO2: 97 % (24 1349) Vital Signs (24h Range):  Temp:  [96.9 °F (36.1 °C)-98.2 °F (36.8 °C)] 96.9 °F (36.1 °C)  Pulse:  [61-82] 78  Resp:  [18-20] 20  SpO2:  [91 %-97 %] 97 %  BP: (107-137)/(49-74) 137/63     Weight: (!) 159.1 kg (350 lb 12 oz)  Body mass index is 53.33 kg/m².     Physical Exam  Vitals and nursing note reviewed.   Constitutional:       Appearance: She is obese. She is ill-appearing.   Cardiovascular:      Pulses: Normal pulses.   Pulmonary:      Effort: Pulmonary effort is normal. No respiratory distress.      Breath sounds: Normal breath sounds. No wheezing.   Abdominal:      General: Bowel sounds are normal. There is distension.      Tenderness: There is abdominal tenderness (LLQ) in the left lower quadrant.      Comments:  "Large obese abdomen with hernia   Musculoskeletal:      Right lower leg: No edema.      Left lower leg: No edema.   Skin:     General: Skin is warm.      Capillary Refill: Capillary refill takes less than 2 seconds.      Findings: Erythema and wound (LLQ dressing cdi) present.      Comments: LLQ abdominal wound; possible cellulitis. Dsg CDI.   Neurological:      Mental Status: She is alert and oriented to person, place, and time. Mental status is at baseline.                Significant Labs: All pertinent labs within the past 24 hours have been reviewed.  A1C:   Recent Labs   Lab 02/19/24  0145   HGBA1C 6.0     ABGs:   No results for input(s): "PH", "PCO2", "HCO3", "POCSATURATED", "BE", "TOTALHB", "COHB", "METHB", "O2HB", "POCFIO2", "PO2" in the last 48 hours.    BMP:   Recent Labs   Lab 04/23/24 0427   GLU 87   *   K 4.2   CL 96   CO2 26   BUN 10   CREATININE 0.6   CALCIUM 9.6   MG 1.8     CBC:   Recent Labs   Lab 04/22/24  0421 04/23/24 0427   WBC 18.11* 7.23   HGB 13.2 13.3   HCT 41.2 42.4    204     CMP:   Recent Labs   Lab 04/22/24 0421 04/23/24 0427   * 132*   K 3.5 4.2   CL 96 96   CO2 26 26    87   BUN 18 10   CREATININE 0.7 0.6   CALCIUM 9.6 9.6   PROT  --  6.5   ALBUMIN  --  2.9*   BILITOT  --  0.5   ALKPHOS  --  64   AST  --  21   ALT  --  13   ANIONGAP 11 10     Cardiac Markers:   No results for input(s): "CKMB", "MYOGLOBIN", "BNP", "TROPISTAT" in the last 48 hours.    Coagulation:   No results for input(s): "PT", "INR", "APTT" in the last 48 hours.    Lactic Acid:   No results for input(s): "LACTATE" in the last 48 hours.    Lipase:   No results for input(s): "LIPASE" in the last 48 hours.      Magnesium:   Recent Labs   Lab 04/22/24 0421 04/23/24 0427   MG 2.1 1.8       Troponin:   No results for input(s): "TROPONINI", "TROPONINIHS" in the last 48 hours.    Urine Studies:   No results for input(s): "COLORU", "APPEARANCEUA", "PHUR", "SPECGRAV", "PROTEINUA", "GLUCUA", " ""KETONESU", "BILIRUBINUA", "OCCULTUA", "NITRITE", "UROBILINOGEN", "LEUKOCYTESUR", "RBCUA", "WBCUA", "BACTERIA", "SQUAMEPITHEL", "HYALINECASTS" in the last 48 hours.    Invalid input(s): "WRIGHTSUR"      Significant Imaging: I have reviewed all pertinent imaging results/findings within the past 24 hours.    Patient Name: NYLA GIBBS MRN: [id number]  (Age): 1950 (73) Gender: F Date of Exam: 2024 Accession: [id number] Referring Physician: DEE JAMES # of Images: 1 Ordered As:   XR CHEST 1 VIEW FRONTAL    Support  [phone]  access.[employer] CONFIDENTIALITY STATEMENT This report is intended only for use by the referring physician, and only in accordance with law. If you received this in error, call [phone]. Page 1 of 1 PROCEDURE INFORMATION: Exam: XR Chest Exam date and time: 2024 2:35 AM Age: 73 years old Clinical indication: Shortness of breath; Patient HX: SOB TECHNIQUE: Imaging protocol: Radiologic exam of the chest. Views: 1 view. COMPARISON: DX XR CHEST PA AND LATERAL 2024 12:14 PM FINDINGS: Lungs: Trace pulmonary vascular congestion. Pleural spaces: Unremarkable. No pleural effusion. No pneumothorax. Heart/Mediastinum: There is mild to moderate cardiomegaly. Bones/joints: Unremarkable. IMPRESSION: Trace pulmonary vascular congestion. Dictated and Authenticated by: Domenico Rodriguez MD 2024 3:15 AM Central Time ([place] & [place])    Assessment/Plan:      * Severe sepsis  This patient does have evidence of infective focus  My overall impression is sepsis.  Source: Urinary Tract and Skin and Soft Tissue (location LLQ)  Antibiotics given-   Antibiotics (72h ago, onward)      Start     Stop Route Frequency Ordered    24 1315  mupirocin 2 % ointment         -- Top 2 times daily 24 1301    24 0230  vancomycin 1,250 mg in dextrose 5 % (D5W) 250 mL IVPB (Vial-Mate)         -- IV Every 12 hours (non-standard times) 24 1306    24 1350  vancomycin - " "pharmacy to dose  (vancomycin IVPB (PEDS and ADULTS))        Placed in "And" Linked Group    -- IV pharmacy to manage frequency 04/22/24 1251          Latest lactate reviewed-  Recent Labs   Lab 04/21/24  0230 04/21/24  0609   LACTATE  --  2.0   POCLAC 1.91*  --            Fluid challenge Contraindicated- Fluid bolus is contraindicated in this patient due to Congestive Heart Failure     Post- resuscitation assessment No - Post resuscitation assessment not needed     Source control achieved by: antibiotics    Surgery consulted   Patient was evaluated by surgeon who advised conservative management.    nasal MRSA ordered.    Will get repeat procalcitonin tomorrow am.  ID was consulted by surgery.    Blood cultures negative so far, on IV vancomycin.   Nasal MRSA came positive, mupirocin started.   ID and surgery on board.   Patient developed facial itching and redness which she said is related to nasal cannula tubing, on p.r.n. Benadryl.      Cystitis  See sepsis      Abdominal wall cellulitis  -See sepsis  -wound care consult      Chronic obstructive pulmonary disease (COPD)  Patient's COPD is controlled currently.  Patient is currently off COPD Pathway. Continue scheduled inhalers Antibiotics and Supplemental oxygen and monitor respiratory status closely.     Type 2 diabetes mellitus with diabetic neuropathy, without long-term current use of insulin  Patient's FSGs are uncontrolled due to hyperglycemia on current medication regimen.  Last A1c reviewed-   Lab Results   Component Value Date    HGBA1C 6.0 02/19/2024     Most recent fingerstick glucose reviewed-   Recent Labs   Lab 04/22/24  1638 04/22/24  2147 04/23/24  0753 04/23/24  1123   POCTGLUCOSE 112* 77 95 113*       Current correctional scale  Low  Maintain anti-hyperglycemic dose as follows-   Antihyperglycemics (From admission, onward)      Start     Stop Route Frequency Ordered    04/21/24 0609  insulin aspart U-100 pen 0-5 Units         -- SubQ Before meals " & nightly PRN 04/21/24 0512          Hold Oral hypoglycemics while patient is in the hospital.        Hypertension associated with diabetes  Hypertension which is uncontrolled.  Latest blood pressure and vitals reviewed-   Temp:  [96.9 °F (36.1 °C)-98.2 °F (36.8 °C)]   Pulse:  [61-82]   Resp:  [18-20]   BP: (107-137)/(49-74)   SpO2:  [91 %-97 %] .   Patient currently off IV antihypertensives.   Home meds for hypertension were reviewed and noted below.   Hypertension Medications               furosemide (LASIX) 40 MG tablet Take 1 tablet (40 mg total) by mouth once daily.    lisinopriL (PRINIVIL,ZESTRIL) 20 MG tablet Take 1 tablet (20 mg total) by mouth once daily.    spironolactone (ALDACTONE) 25 MG tablet Take 1 tablet (25 mg total) by mouth 3 (three) times a week.                Will goal for controlled BP reduction as noted be medications noted above and monitor and mitigate end organ damage as indicated.        Chronic atrial fibrillation  Patient with Permanent atrial fibrillation which is uncontrolled currently with Beta Blocker. Patient is currently in atrial fibrillation.[id number] Score: 4.  Anticoagulation indicated. Anticoagulation done with Eliquis .      VTE Risk Mitigation (From admission, onward)           Ordered     apixaban tablet 5 mg  2 times daily         04/21/24 0512     IP VTE HIGH RISK PATIENT  Once         04/21/24 0512     Place sequential compression device  Until discontinued         04/21/24 0512                    Discharge Planning   KRISTOPHER: 4/24/2024     Code Status: Full Code   Is the patient medically ready for discharge?:     Reason for patient still in hospital (select all that apply): Treatment  Discharge Plan A: Home Health                  Garrett Mcneal MD  Department of Hospital Medicine   [facility]/Surg

## 2024-04-23 NOTE — ASSESSMENT & PLAN NOTE
"This patient does have evidence of infective focus  My overall impression is sepsis.  Source: Urinary Tract and Skin and Soft Tissue (location LLQ)  Antibiotics given-   Antibiotics (72h ago, onward)      Start     Stop Route Frequency Ordered    04/23/24 1315  mupirocin 2 % ointment         -- Top 2 times daily 04/23/24 1301    04/23/24 0230  vancomycin 1,250 mg in dextrose 5 % (D5W) 250 mL IVPB (Vial-Mate)         -- IV Every 12 hours (non-standard times) 04/22/24 1306    04/22/24 1350  vancomycin - pharmacy to dose  (vancomycin IVPB (PEDS and ADULTS))        Placed in "And" Linked Group    -- IV pharmacy to manage frequency 04/22/24 1251          Latest lactate reviewed-  Recent Labs   Lab 04/21/24  0230 04/21/24  0609   LACTATE  --  2.0   POCLAC 1.91*  --            Fluid challenge Contraindicated- Fluid bolus is contraindicated in this patient due to Congestive Heart Failure     Post- resuscitation assessment No - Post resuscitation assessment not needed     Source control achieved by: antibiotics    Surgery consulted   Patient was evaluated by surgeon who advised conservative management.    nasal MRSA ordered.    Will get repeat procalcitonin tomorrow am.  ID was consulted by surgery.    Blood cultures negative so far, on IV vancomycin.   Nasal MRSA came positive, mupirocin started.   ID and surgery on board.   Patient developed facial itching and redness which she said is related to nasal cannula tubing, on p.r.n. Benadryl.    "

## 2024-04-23 NOTE — CARE UPDATE
04/23/24 0800   Patient Assessment/Suction   Level of Consciousness (AVPU) alert   Respiratory Effort Normal;Unlabored   Expansion/Accessory Muscles/Retractions no use of accessory muscles;no retractions;expansion symmetric   All Lung Fields Breath Sounds Anterior:;Lateral:;diminished   Rhythm/Pattern, Respiratory no shortness of breath reported;unlabored;depth regular;pattern regular   Cough Frequency no cough   Skin Integrity   $ Wound Care Tech Time 15 min   Area Observed Left;Right;Cheek;Chin;Forehead  (CHEST)   Skin Appearance redness blanchable  (PATIENT STATES REDNESS IS FROM NC)   PRE-TX-O2   Device (Oxygen Therapy) nasal cannula   $ Is the patient on Low Flow Oxygen? Yes   Flow (L/min) (Oxygen Therapy) 2  (NC 2 LPM Q HS PRN)   SpO2 (!) 91 %   Pulse 63   Resp 18   Positioning HOB elevated 30 degrees   Aerosol Therapy   $ Aerosol Therapy Charges Aerosol Treatment   Respiratory Treatment Status (SVN) given   Treatment Route (SVN) mask;oxygen   Patient Position HOB elevated   Post Treatment Assessment (SVN) increased aeration   Signs of Intolerance (SVN) none     Notified Md and nurse

## 2024-04-23 NOTE — SUBJECTIVE & OBJECTIVE
Past Medical History:   Diagnosis Date    *Atrial flutter     Angina pectoris 2017    Anxiety     Arthritis     Asthma     Atrial fibrillation     Back pain     Cataract     OD    Chest pain 2017    CHF (congestive heart failure)     Chronically on benzodiazepine therapy 2019    COPD (chronic obstructive pulmonary disease)     Depression     Diabetes mellitus     Emphysema of lung     Heart failure     Hepatomegaly 2/3/2016    Hernia     History of MI (myocardial infarction) 2016    Hypercapnic respiratory failure, chronic 2016    Hyperlipidemia     Hypertension     Iron deficiency anemia 2/3/2016    Myocardial infarction     Obesity     Peripheral vascular disease     Pneumonia     Polyneuropathy     Retinal detachment     OS    Septic shock 2017    Skin ulcer 3/18/2017    Tobacco dependence     Type II 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, not stated as uncontrolled(250.60)        Past Surgical History:   Procedure Laterality Date    BREAST BIOPSY Left 10 yrs ago    benign    CATARACT EXTRACTION Left     OS     CATARACT EXTRACTION W/  INTRAOCULAR LENS IMPLANT Right 2023    Procedure: CEIOL OD;  Surgeon: David Bautista MD;  Location: [facility];  Service: Ophthalmology;  Laterality: Right;     SECTION      x2    EYE SURGERY      HYSTERECTOMY      partial    OOPHORECTOMY      one ovary conserved    RETINAL DETACHMENT SURGERY      buckle --OS    TONSILLECTOMY         Review of patient's allergies indicates:   Allergen Reactions    Dilaudid [hydromorphone] Anaphylaxis     Other reaction(s): Anaphylaxis  Other reaction(s): Unknown    Zyvox [linezolid in dextrose 5%] Shortness Of Breath       Current Facility-Administered Medications   Medication Dose Route Frequency Provider Last Rate Last Admin    acetaminophen tablet 650 mg  650 mg Oral Q4H PRN Evelyn Townsend DNP   650 mg at 24 1354    albuterol-ipratropium 2.5 mg-0.5 mg/3 mL  nebulizer solution 3 mL  3 mL Nebulization Q6H WAKE Evelyn Townsend DNP   3 mL at 04/23/24 1349    aluminum-magnesium hydroxide-simethicone 200-200-20 mg/5 mL suspension 30 mL  30 mL Oral QID PRN Evelyn Townsend DNP        apixaban tablet 5 mg  5 mg Oral BID Evelyn Townsend DNP   5 mg at 04/23/24 0933    artificial tears 0.5 % ophthalmic solution 1 drop  1 drop Both Eyes TID PRN Garrett Mcneal MD   1 drop at 04/22/24 2225    atorvastatin tablet 10 mg  10 mg Oral Daily Evelyn Townsend DNP   10 mg at 04/23/24 0933    budesonide nebulizer solution 0.25 mg  0.25 mg Nebulization Daily Evelyn Townsend DNP   0.25 mg at 04/23/24 0800    chlorhexidine 0.12 % solution 15 mL  15 mL Mouth/Throat BID Joanie Kuo MD   15 mL at 04/23/24 0932    dextrose 10% bolus 125 mL 125 mL  12.5 g Intravenous PRN Evelyn Townsend DNP        dextrose 10% bolus 250 mL 250 mL  25 g Intravenous PRN Evelyn Townsend DNP        diphenhydrAMINE capsule 25 mg  25 mg Oral Q6H PRN Martina Antoine FNP   25 mg at 04/23/24 0934    diphenhydrAMINE-zinc acetate 2-0.1% cream   Topical (Top) BID PRN Garrett Mcneal MD        furosemide tablet 40 mg  40 mg Oral Daily Evelyn Townsend DNP   40 mg at 04/23/24 0933    gabapentin capsule 200 mg  200 mg Oral TID Evelyn Townsend DNP   200 mg at 04/23/24 1354    glucagon (human recombinant) injection 1 mg  1 mg Intramuscular PRN Evelyn Townsend DNP        glucose chewable tablet 16 g  16 g Oral PRN Evelyn Townsend DNP        glucose chewable tablet 24 g  24 g Oral PRN Evelyn Townsend DNP        insulin aspart U-100 pen 0-5 Units  0-5 Units Subcutaneous QID (AC + HS) PRN Evelyn Townsend DNP        lisinopriL tablet 20 mg  20 mg Oral Daily Evelyn Townsend DNP   20 mg at 04/23/24 0933    magnesium oxide tablet 800 mg  800 mg Oral PRN Evelyn Townsend DNP   800 mg at 04/23/24 0629    magnesium oxide tablet 800 mg  800 mg Oral PRN Evelyn Townsend DNP        melatonin tablet 9 mg  9 mg  Oral Nightly PRN Evelyn Townsend DNP   9 mg at 04/22/24 2148    morphine injection 2 mg  2 mg Intravenous Q4H PRN Evelyn Townsend DNP        mupirocin 2 % ointment   Topical (Top) BID Joanie Kuo MD   1 g at 04/23/24 1354    naloxone 0.4 mg/mL injection 0.02 mg  0.02 mg Intravenous PRN Evelyn Townsend DNP        ondansetron injection 4 mg  4 mg Intravenous Q6H PRN Evelyn Townsend DNP        ondansetron injection 4 mg  4 mg Intravenous Q6H PRN Evelyn Townsend DNP        potassium bicarbonate disintegrating tablet 35 mEq  35 mEq Oral PRN Evelyn Townsend DNP        potassium bicarbonate disintegrating tablet 50 mEq  50 mEq Oral PRN Evelyn Townsend DNP   50 mEq at 04/22/24 0624    potassium bicarbonate disintegrating tablet 60 mEq  60 mEq Oral PRN Evelyn Townsend DNP        senna-docusate 8.6-50 mg per tablet 1 tablet  1 tablet Oral BID PRN Evelyn Townsend DNP        sodium chloride 0.9% flush 10 mL  10 mL Intravenous Q12H PRN Evelyn Townsend DNP        spironolactone tablet 25 mg  25 mg Oral Once per day on Monday Wednesday Friday Evelyn Townsend DNP   25 mg at 04/22/24 1512    vancomycin - pharmacy to dose   Intravenous pharmacy to manage frequency Garrett Mcneal MD        vancomycin 1,250 mg in dextrose 5 % (D5W) 250 mL IVPB (Vial-Mate)  1,250 mg Intravenous Q12H Garrett Mcneal .7 mL/hr at 04/23/24 1355 1,250 mg at 04/23/24 1355     Family History       Problem Relation (Age of Onset)    Alcohol abuse Mother    Arrhythmia Father    Arthritis Father, Sister    Blindness Son    Cancer Brother    Cirrhosis Mother    Coronary artery disease Father, Sister    Crohn's disease Sister    Early death Sister (30)    Heart attack Father, Sister    Heart disease Father, Sister (32), Sister    Lung cancer Brother    No Known Problems Brother, Daughter          Tobacco Use    Smoking status: Former     Current packs/day: 0.00     Average packs/day: 0.3 packs/day for 54.4 years (16.3 ttl  pk-yrs)     Types: Cigarettes     Start date: 1968     Quit date: 2022     Years since quittin.8    Smokeless tobacco: Never   Substance and Sexual Activity    Alcohol use: No     Alcohol/week: 0.0 standard drinks of alcohol    Drug use: No    Sexual activity: Not Currently     Interval History:  Patient reported abdominal redness better than yesterday.  Blood cultures negative so far, on IV vancomycin. Nasal MRSA came positive, mupirocin started. ID and surgery on board. Patient developed facial itching and redness which she said is related to nasal cannula tubing, on p.r.n. Benadryl.      Review of Systems   Constitutional:  Negative for activity change, appetite change, chills, diaphoresis, fatigue and fever.   Respiratory:  Negative for cough, shortness of breath and wheezing.    Cardiovascular:  Negative for chest pain and leg swelling.   Gastrointestinal:  Negative for abdominal pain, constipation, nausea and vomiting.   Genitourinary:  Negative for difficulty urinating.   Skin:         Redness lower abdomen   Neurological:  Negative for dizziness, light-headedness and headaches.     Objective:     Vital Signs (Most Recent):  Temp: 96.9 °F (36.1 °C) (24 1129)  Pulse: 78 (24 1349)  Resp: 20 (24 1349)  BP: 137/63 (24 1129)  SpO2: 97 % (24 1349) Vital Signs (24h Range):  Temp:  [96.9 °F (36.1 °C)-98.2 °F (36.8 °C)] 96.9 °F (36.1 °C)  Pulse:  [61-82] 78  Resp:  [18-20] 20  SpO2:  [91 %-97 %] 97 %  BP: (107-137)/(49-74) 137/63     Weight: (!) 159.1 kg (350 lb 12 oz)  Body mass index is 53.33 kg/m².     Physical Exam  Vitals and nursing note reviewed.   Constitutional:       Appearance: She is obese. She is ill-appearing.   Cardiovascular:      Pulses: Normal pulses.   Pulmonary:      Effort: Pulmonary effort is normal. No respiratory distress.      Breath sounds: Normal breath sounds. No wheezing.   Abdominal:      General: Bowel sounds are normal. There is distension.     "  Tenderness: There is abdominal tenderness (LLQ) in the left lower quadrant.      Comments: Large obese abdomen with hernia   Musculoskeletal:      Right lower leg: No edema.      Left lower leg: No edema.   Skin:     General: Skin is warm.      Capillary Refill: Capillary refill takes less than 2 seconds.      Findings: Erythema and wound (LLQ dressing cdi) present.      Comments: LLQ abdominal wound; possible cellulitis. Dsg CDI.   Neurological:      Mental Status: She is alert and oriented to person, place, and time. Mental status is at baseline.                Significant Labs: All pertinent labs within the past 24 hours have been reviewed.  A1C:   Recent Labs   Lab 02/19/24  0145   HGBA1C 6.0     ABGs:   No results for input(s): "PH", "PCO2", "HCO3", "POCSATURATED", "BE", "TOTALHB", "COHB", "METHB", "O2HB", "POCFIO2", "PO2" in the last 48 hours.    BMP:   Recent Labs   Lab 04/23/24 0427   GLU 87   *   K 4.2   CL 96   CO2 26   BUN 10   CREATININE 0.6   CALCIUM 9.6   MG 1.8     CBC:   Recent Labs   Lab 04/22/24  0421 04/23/24 0427   WBC 18.11* 7.23   HGB 13.2 13.3   HCT 41.2 42.4    204     CMP:   Recent Labs   Lab 04/22/24  0421 04/23/24 0427   * 132*   K 3.5 4.2   CL 96 96   CO2 26 26    87   BUN 18 10   CREATININE 0.7 0.6   CALCIUM 9.6 9.6   PROT  --  6.5   ALBUMIN  --  2.9*   BILITOT  --  0.5   ALKPHOS  --  64   AST  --  21   ALT  --  13   ANIONGAP 11 10     Cardiac Markers:   No results for input(s): "CKMB", "MYOGLOBIN", "BNP", "TROPISTAT" in the last 48 hours.    Coagulation:   No results for input(s): "PT", "INR", "APTT" in the last 48 hours.    Lactic Acid:   No results for input(s): "LACTATE" in the last 48 hours.    Lipase:   No results for input(s): "LIPASE" in the last 48 hours.      Magnesium:   Recent Labs   Lab 04/22/24  0421 04/23/24  0427   MG 2.1 1.8       Troponin:   No results for input(s): "TROPONINI", "TROPONINIHS" in the last 48 hours.    Urine Studies:   No " "results for input(s): "COLORU", "APPEARANCEUA", "PHUR", "SPECGRAV", "PROTEINUA", "GLUCUA", "KETONESU", "BILIRUBINUA", "OCCULTUA", "NITRITE", "UROBILINOGEN", "LEUKOCYTESUR", "RBCUA", "WBCUA", "BACTERIA", "SQUAMEPITHEL", "HYALINECASTS" in the last 48 hours.    Invalid input(s): "WRIGHTSUR"      Significant Imaging: I have reviewed all pertinent imaging results/findings within the past 24 hours.    Patient Name: NYLA GIBBS MRN: [id number]  (Age): 1950 (73) Gender: F Date of Exam: 2024 Accession: [id number] Referring Physician: DEE JAMES # of Images: 1 Ordered As:   XR CHEST 1 VIEW FRONTAL    Support  [phone]  access.[facility] [employer] CONFIDENTIALITY STATEMENT This report is intended only for use by the referring physician, and only in accordance with law. If you received this in error, call [phone]. Page 1 of 1 PROCEDURE INFORMATION: Exam: XR Chest Exam date and time: 2024 2:35 AM Age: 73 years old Clinical indication: Shortness of breath; Patient HX: SOB TECHNIQUE: Imaging protocol: Radiologic exam of the chest. Views: 1 view. COMPARISON: DX XR CHEST PA AND LATERAL 2024 12:14 PM FINDINGS: Lungs: Trace pulmonary vascular congestion. Pleural spaces: Unremarkable. No pleural effusion. No pneumothorax. Heart/Mediastinum: There is mild to moderate cardiomegaly. Bones/joints: Unremarkable. IMPRESSION: Trace pulmonary vascular congestion. Dictated and Authenticated by: Domenico Rodriguez MD 2024 3:15 AM Central Time ([place] & [place])  "

## 2024-04-23 NOTE — PT/OT/SLP PROGRESS
Physical Therapy      Patient Name:  Nelly Tian   MRN:  [id number]    PT attempted. Pt seen supine in bed, now on MRSA nares isolation. Pt stated that she gets up to bathroom on own to void and is upset about rashes that developed from meds. Pt encouraged ankle pumping exercises. Asking for ice and popsicle- provided by PT and OK with nurse Calin

## 2024-04-23 NOTE — ASSESSMENT & PLAN NOTE
Pt states he has shoulder pain and right knee is giving out on him. Pt wants to see Dr. Lobo Neri will have to have three days due to the ride from insurance. See sepsis

## 2024-04-23 NOTE — PROGRESS NOTES
Pharmacokinetic Assessment Follow Up: IV Vancomycin    Vancomycin serum concentration assessment(s):    The trough level was drawn correctly and can be used to guide therapy at this time. The measurement is within the desired definitive target range of 15 to 20 mcg/mL.    Vancomycin Regimen Plan:    Continue regimen to Vancomycin 1250 mg IV every 12 hours with next serum trough concentration measured at 1330 prior to 3rd dose on 4/24    Drug levels (last 3 results):  Recent Labs   Lab Result Units 04/23/24  1338   Vancomycin-Trough ug/mL 12.6       Pharmacy will continue to follow and monitor vancomycin.    Please contact pharmacy at extension [phone] for questions regarding this assessment.    Thank you for the consult,   Halina Clemons       Patient brief summary:  Nelly Tian is a 73 y.o. female initiated on antimicrobial therapy with IV Vancomycin for treatment of skin & soft tissue infection    The patient's current regimen is 1250 mg every 12 hours    Drug Allergies:   Review of patient's allergies indicates:   Allergen Reactions    Dilaudid [hydromorphone] Anaphylaxis     Other reaction(s): Anaphylaxis  Other reaction(s): Unknown    Zyvox [linezolid in dextrose 5%] Shortness Of Breath       Actual Body Weight:   159.1    Renal Function:   Estimated Creatinine Clearance: 136.5 mL/min (based on SCr of 0.6 mg/dL).,     Dialysis Method (if applicable):  N/A    CBC (last 72 hours):  Recent Labs   Lab Result Units 04/21/24  0240 04/22/24 0421 04/23/24 0427   WBC K/uL 13.92* 18.11* 7.23   Hemoglobin g/dL 15.1 13.2 13.3   Hematocrit % 48.4 41.2 42.4   Platelets K/uL 210 201 204   Gran % % 78.1* 77.4* 65.0   Lymph % % 9.6* 10.9* 18.4   Mono % % 8.5 10.3 12.9   Eosinophil % % 3.1 0.7 3.0   Basophil % % 0.3 0.3 0.4   Differential Method  Automated Automated Automated       Metabolic Panel (last 72 hours):  Recent Labs   Lab Result Units 04/21/24  0240 04/21/24  0311 04/22/24  0421 04/23/24  0427   Sodium  mmol/L 136  --  133* 132*   Potassium mmol/L 4.9  --  3.5 4.2   Chloride mmol/L 96  --  96 96   CO2 mmol/L 24  --  26 26   Glucose mg/dL 119*  --  107 87   Glucose, UA   --  4+*  --   --    BUN mg/dL 14  --  18 10   Creatinine mg/dL 0.7  --  0.7 0.6   Albumin g/dL 3.8  --   --  2.9*   Total Bilirubin mg/dL 0.4  --   --  0.5   Alkaline Phosphatase U/L 100  --   --  64   AST U/L 26  --   --  21   ALT U/L 19  --   --  13   Magnesium mg/dL 1.8  --  2.1 1.8   Phosphorus mg/dL  --   --  3.9  --        Vancomycin Administrations:  vancomycin given in the last 96 hours                     vancomycin 1,250 mg in dextrose 5 % (D5W) 250 mL IVPB (Vial-Mate) (mg) 1,250 mg New Bag 04/23/24 1355     1,250 mg New Bag  0156    vancomycin (VANCOCIN) 2,000 mg in dextrose 5 % (D5W) 500 mL IVPB (mg) 2,000 mg New Bag 04/22/24 1508                    Microbiologic Results:  Microbiology Results (last 7 days)       Procedure Component Value Units Date/Time    Blood culture x two cultures. Draw prior to antibiotics. [[id number]] Collected: 04/21/24 0240    Order Status: Completed Specimen: Blood from Peripheral, Wrist, Left Updated: 04/23/24 1032     Blood Culture, Routine No Growth to date      No Growth to date      No Growth to date    Narrative:      Aerobic and anaerobic    Blood culture x two cultures. Draw prior to antibiotics. [[id number]] Collected: 04/21/24 0240    Order Status: Completed Specimen: Blood from Peripheral, Antecubital, Right Updated: 04/23/24 1032     Blood Culture, Routine No Growth to date      No Growth to date      No Growth to date    Narrative:      Aerobic and anaerobic    MRSA Screen by PCR [[id number]]  (Abnormal) Collected: 04/22/24 1313    Order Status: Completed Specimen: Nasal Swab Updated: 04/22/24 2143     MRSA SCREEN BY PCR Positive     Comment: MRSA PCR critical result(s) called and verbal readback obtained from   Josué Hugo RN at [facility]     by ORQUIDEA 04/22/2024 21:43         Narrative:      MRSA PCR  critical result(s) called and verbal readback obtained from   Josué Hugo RN at [facility]     by KS3 04/22/2024 21:43    Influenza A & B by Molecular [[id number]] Collected: 04/21/24 0219    Order Status: Completed Specimen: Nasal Swab Updated: 04/21/24 0309     Influenza A, Molecular Negative     Influenza B, Molecular Negative     Flu A & B Source Nasal swab

## 2024-04-23 NOTE — PROGRESS NOTES
Doing well  Erythema resolving    Cellulitis  Reconsult if worsens  Appears controlled on antibiotics

## 2024-04-23 NOTE — PLAN OF CARE
Problem: Adult Inpatient Plan of Care  Goal: Plan of Care Review  Outcome: Ongoing, Progressing  Goal: Patient-Specific Goal (Individualized)  Outcome: Ongoing, Progressing  Goal: Optimal Comfort and Wellbeing  Outcome: Ongoing, Progressing     Problem: Bariatric Environmental Safety  Goal: Safety Maintained with Care  Outcome: Ongoing, Progressing     Problem: Skin Injury Risk Increased  Goal: Skin Health and Integrity  Outcome: Ongoing, Progressing     Problem: Diabetes Comorbidity  Goal: Blood Glucose Level Within Targeted Range  Outcome: Ongoing, Progressing     Problem: Adjustment to Illness (Sepsis/Septic Shock)  Goal: Optimal Coping  Outcome: Ongoing, Progressing     Problem: Glycemic Control Impaired (Sepsis/Septic Shock)  Goal: Blood Glucose Level Within Desired Range  Outcome: Ongoing, Progressing     Problem: Infection Progression (Sepsis/Septic Shock)  Goal: Absence of Infection Signs and Symptoms  Outcome: Ongoing, Progressing     Problem: Infection (Pneumonia)  Goal: Resolution of Infection Signs and Symptoms  Outcome: Ongoing, Progressing     Problem: Respiratory Compromise (Pneumonia)  Goal: Effective Oxygenation and Ventilation  Outcome: Ongoing, Progressing     Problem: Infection  Goal: Absence of Infection Signs and Symptoms  Outcome: Ongoing, Progressing     Problem: Fall Injury Risk  Goal: Absence of Fall and Fall-Related Injury  Outcome: Ongoing, Progressing     Problem: Pain Acute  Goal: Acceptable Pain Control and Functional Ability  Outcome: Ongoing, Progressing     Problem: UTI (Urinary Tract Infection)  Goal: Improved Infection Symptoms  Outcome: Ongoing, Progressing     Problem: Skin or Soft Tissue Infection  Goal: Absence of Infection Signs and Symptoms  Outcome: Ongoing, Progressing

## 2024-04-23 NOTE — ASSESSMENT & PLAN NOTE
Patient with Permanent atrial fibrillation which is uncontrolled currently with Beta Blocker. Patient is currently in atrial fibrillation.[id number] Score: 4.  Anticoagulation indicated. Anticoagulation done with Eliquis .

## 2024-04-24 VITALS
OXYGEN SATURATION: 95 % | TEMPERATURE: 98 F | BODY MASS INDEX: 44.41 KG/M2 | DIASTOLIC BLOOD PRESSURE: 62 MMHG | WEIGHT: 293 LBS | SYSTOLIC BLOOD PRESSURE: 121 MMHG | HEART RATE: 64 BPM | RESPIRATION RATE: 20 BRPM | HEIGHT: 68 IN

## 2024-04-24 LAB
ALBUMIN SERPL BCP-MCNC: 2.9 G/DL (ref 3.5–5.2)
ALP SERPL-CCNC: 69 U/L (ref 55–135)
ALT SERPL W/O P-5'-P-CCNC: 14 U/L (ref 10–44)
ANION GAP SERPL CALC-SCNC: 9 MMOL/L (ref 8–16)
AST SERPL-CCNC: 19 U/L (ref 10–40)
BASOPHILS # BLD AUTO: 0.02 K/UL (ref 0–0.2)
BASOPHILS NFR BLD: 0.3 % (ref 0–1.9)
BILIRUB SERPL-MCNC: 0.6 MG/DL (ref 0.1–1)
BUN SERPL-MCNC: 12 MG/DL (ref 8–23)
CALCIUM SERPL-MCNC: 9.3 MG/DL (ref 8.7–10.5)
CHLORIDE SERPL-SCNC: 96 MMOL/L (ref 95–110)
CO2 SERPL-SCNC: 27 MMOL/L (ref 23–29)
CREAT SERPL-MCNC: 0.6 MG/DL (ref 0.5–1.4)
CRP SERPL-MCNC: 80.9 MG/L (ref 0–8.2)
DIFFERENTIAL METHOD BLD: ABNORMAL
EOSINOPHIL # BLD AUTO: 0.4 K/UL (ref 0–0.5)
EOSINOPHIL NFR BLD: 5.8 % (ref 0–8)
ERYTHROCYTE [DISTWIDTH] IN BLOOD BY AUTOMATED COUNT: 14.1 % (ref 11.5–14.5)
EST. GFR  (NO RACE VARIABLE): >60 ML/MIN/1.73 M^2
GLUCOSE SERPL-MCNC: 94 MG/DL (ref 70–110)
HCT VFR BLD AUTO: 40 % (ref 37–48.5)
HGB BLD-MCNC: 12.9 G/DL (ref 12–16)
IMM GRANULOCYTES # BLD AUTO: 0.02 K/UL (ref 0–0.04)
IMM GRANULOCYTES NFR BLD AUTO: 0.3 % (ref 0–0.5)
LYMPHOCYTES # BLD AUTO: 1.4 K/UL (ref 1–4.8)
LYMPHOCYTES NFR BLD: 22.9 % (ref 18–48)
MAGNESIUM SERPL-MCNC: 1.8 MG/DL (ref 1.6–2.6)
MCH RBC QN AUTO: 30.6 PG (ref 27–31)
MCHC RBC AUTO-ENTMCNC: 32.3 G/DL (ref 32–36)
MCV RBC AUTO: 95 FL (ref 82–98)
MONOCYTES # BLD AUTO: 1 K/UL (ref 0.3–1)
MONOCYTES NFR BLD: 16.1 % (ref 4–15)
NEUTROPHILS # BLD AUTO: 3.3 K/UL (ref 1.8–7.7)
NEUTROPHILS NFR BLD: 54.6 % (ref 38–73)
NRBC BLD-RTO: 0 /100 WBC
PLATELET # BLD AUTO: 182 K/UL (ref 150–450)
PMV BLD AUTO: 10.5 FL (ref 9.2–12.9)
POCT GLUCOSE: 114 MG/DL (ref 70–110)
POCT GLUCOSE: 118 MG/DL (ref 70–110)
POTASSIUM SERPL-SCNC: 4 MMOL/L (ref 3.5–5.1)
PROT SERPL-MCNC: 6.5 G/DL (ref 6–8.4)
RBC # BLD AUTO: 4.21 M/UL (ref 4–5.4)
SODIUM SERPL-SCNC: 132 MMOL/L (ref 136–145)
TROPONIN I SERPL DL<=0.01 NG/ML-MCNC: <0.006 NG/ML (ref 0–0.03)
WBC # BLD AUTO: 6.02 K/UL (ref 3.9–12.7)

## 2024-04-24 PROCEDURE — 25000242 PHARM REV CODE 250 ALT 637 W/ HCPCS

## 2024-04-24 PROCEDURE — 76937 US GUIDE VASCULAR ACCESS: CPT

## 2024-04-24 PROCEDURE — 80053 COMPREHEN METABOLIC PANEL: CPT | Performed by: INTERNAL MEDICINE

## 2024-04-24 PROCEDURE — C1751 CATH, INF, PER/CENT/MIDLINE: HCPCS

## 2024-04-24 PROCEDURE — 85025 COMPLETE CBC W/AUTO DIFF WBC: CPT

## 2024-04-24 PROCEDURE — 94640 AIRWAY INHALATION TREATMENT: CPT

## 2024-04-24 PROCEDURE — 27000221 HC OXYGEN, UP TO 24 HOURS

## 2024-04-24 PROCEDURE — 93010 ELECTROCARDIOGRAM REPORT: CPT | Mod: ,,, | Performed by: GENERAL PRACTICE

## 2024-04-24 PROCEDURE — 25000003 PHARM REV CODE 250

## 2024-04-24 PROCEDURE — 36410 VNPNXR 3YR/> PHY/QHP DX/THER: CPT

## 2024-04-24 PROCEDURE — 63600175 PHARM REV CODE 636 W HCPCS: Performed by: INTERNAL MEDICINE

## 2024-04-24 PROCEDURE — 25000003 PHARM REV CODE 250: Performed by: NURSE PRACTITIONER

## 2024-04-24 PROCEDURE — 25000003 PHARM REV CODE 250: Performed by: INTERNAL MEDICINE

## 2024-04-24 PROCEDURE — 84484 ASSAY OF TROPONIN QUANT: CPT | Performed by: INTERNAL MEDICINE

## 2024-04-24 PROCEDURE — 63600175 PHARM REV CODE 636 W HCPCS: Performed by: STUDENT IN AN ORGANIZED HEALTH CARE EDUCATION/TRAINING PROGRAM

## 2024-04-24 PROCEDURE — 99233 SBSQ HOSP IP/OBS HIGH 50: CPT | Mod: ,,, | Performed by: STUDENT IN AN ORGANIZED HEALTH CARE EDUCATION/TRAINING PROGRAM

## 2024-04-24 PROCEDURE — 36415 COLL VENOUS BLD VENIPUNCTURE: CPT | Performed by: INTERNAL MEDICINE

## 2024-04-24 PROCEDURE — 83735 ASSAY OF MAGNESIUM: CPT | Performed by: INTERNAL MEDICINE

## 2024-04-24 PROCEDURE — 86140 C-REACTIVE PROTEIN: CPT | Performed by: INTERNAL MEDICINE

## 2024-04-24 PROCEDURE — 25000003 PHARM REV CODE 250: Performed by: STUDENT IN AN ORGANIZED HEALTH CARE EDUCATION/TRAINING PROGRAM

## 2024-04-24 PROCEDURE — 94761 N-INVAS EAR/PLS OXIMETRY MLT: CPT

## 2024-04-24 PROCEDURE — 99900035 HC TECH TIME PER 15 MIN (STAT)

## 2024-04-24 PROCEDURE — 93005 ELECTROCARDIOGRAM TRACING: CPT

## 2024-04-24 RX ORDER — HYDROCODONE BITARTRATE AND ACETAMINOPHEN 7.5; 325 MG/1; MG/1
1 TABLET ORAL ONCE
Status: COMPLETED | OUTPATIENT
Start: 2024-04-24 | End: 2024-04-24

## 2024-04-24 RX ADMIN — DAPTOMYCIN 800 MG: 350 INJECTION, POWDER, LYOPHILIZED, FOR SOLUTION INTRAVENOUS at 12:04

## 2024-04-24 RX ADMIN — ATORVASTATIN CALCIUM 10 MG: 10 TABLET, FILM COATED ORAL at 08:04

## 2024-04-24 RX ADMIN — GABAPENTIN 200 MG: 100 CAPSULE ORAL at 02:04

## 2024-04-24 RX ADMIN — APIXABAN 5 MG: 2.5 TABLET, FILM COATED ORAL at 08:04

## 2024-04-24 RX ADMIN — LISINOPRIL 20 MG: 10 TABLET ORAL at 08:04

## 2024-04-24 RX ADMIN — GABAPENTIN 200 MG: 100 CAPSULE ORAL at 08:04

## 2024-04-24 RX ADMIN — Medication 800 MG: at 06:04

## 2024-04-24 RX ADMIN — IPRATROPIUM BROMIDE AND ALBUTEROL SULFATE 3 ML: 2.5; .5 SOLUTION RESPIRATORY (INHALATION) at 08:04

## 2024-04-24 RX ADMIN — MUPIROCIN 1 G: 20 OINTMENT TOPICAL at 08:04

## 2024-04-24 RX ADMIN — BUDESONIDE 0.25 MG: 0.25 SUSPENSION RESPIRATORY (INHALATION) at 08:04

## 2024-04-24 RX ADMIN — HYDROCODONE BITARTRATE AND ACETAMINOPHEN 1 TABLET: 7.5; 325 TABLET ORAL at 02:04

## 2024-04-24 RX ADMIN — VANCOMYCIN HYDROCHLORIDE 1250 MG: 1.25 INJECTION, POWDER, LYOPHILIZED, FOR SOLUTION INTRAVENOUS at 01:04

## 2024-04-24 RX ADMIN — CHLORHEXIDINE GLUCONATE 0.12% ORAL RINSE 15 ML: 1.2 LIQUID ORAL at 08:04

## 2024-04-24 RX ADMIN — ACETAMINOPHEN 650 MG: 325 TABLET ORAL at 12:04

## 2024-04-24 RX ADMIN — IPRATROPIUM BROMIDE AND ALBUTEROL SULFATE 3 ML: 2.5; .5 SOLUTION RESPIRATORY (INHALATION) at 01:04

## 2024-04-24 RX ADMIN — DIPHENHYDRAMINE HYDROCHLORIDE 25 MG: 25 CAPSULE ORAL at 05:04

## 2024-04-24 NOTE — DISCHARGE INSTRUCTIONS
IV Daptomycin 800 mg daily through May 4th then stop.  Get midline out after last dose of IV antibitoic.  Get blood tests CBC with Diff, CMP, CPK, and CRP every Mondays and Thursdays while on IV antibiotic.  Follow up with PCP and Infectious Disease doctor.

## 2024-04-24 NOTE — CONSULTS
18 Gx 10cm PowerGlide Midline placed to pts RIGHT cephalic vein with the use of ultrasound guidance.    Ultrasound guidance: yes  Vessel Caliber: large and patent, compressibility normal  Needle advanced into vessel with real time Ultrasound guidance.  Guidewire confirmed in vessel.  Image recorded and saved.  Sterile sheath used.  Sterile dressing applied  Arm circumference- 38cm  Dressing dated   Education provided to patient re: proper maintenance of line- pt verbalized understanding  Limb alert applied.

## 2024-04-24 NOTE — PROGRESS NOTES
"[facility]   Department of Infectious Disease  Progress Note        PATIENT NAME: Nelly Tian  MRN: [id number]  TODAY'S DATE: 04/24/2024  ADMIT DATE: 4/21/2024  LOS: 3 days    CHIEF COMPLAINT: Wound Infection (sore on left lower abdomen with foul drainage)    PRINCIPLE PROBLEM: Severe sepsis    INTERVAL HISTORY      4/24:  Patient seen and examined, she is sitting in bed, had a better night.  She reports her left abdominal cellulitis is improved, dressing in place, triad applied by wound care, plan to change dressing today.  Slightly hypertensive, if had a bowel movement yesterday.  Labs reviewed, stable white count, no left shift, H&H 12.9/40, platelet count 182, hyponatremia 132, potassium 4, stable kidney and liver function tests, CRP down to 80.9.  Discussed plan with patient and hospitalist, will sent home on daptomycin 800 mg IV daily for dose to be given today, midline ordered, she needs follow-up ID clinic in 1-2 weeks.    4/23:  Patient seen and examined, she is lying in bed, scratching her face and neck, reports the oxygen cannula is very itchy and is causing her allergy.  Hemodynamically stable, afebrile.  Adequate urinary output, had a bowel movement yesterday.  Labs reviewed, white count normal 7.2, no left shift, H&H 13.3/42.4, platelet count 204.  Sed rate 45, hyponatremia 132, normal kidney function, normal LFTs, .1, baseline CPK 65.  Procalcitonin 0.99.  MRSA nasal swab detected.  Blood cultures x2 sets no growth to date, pending final.    Antibiotics (From admission, onward)      Start     Stop Route Frequency Ordered    04/23/24 1315  mupirocin 2 % ointment         -- Top 2 times daily 04/23/24 1301    04/23/24 0230  vancomycin 1,250 mg in dextrose 5 % (D5W) 250 mL IVPB (Vial-Mate)         -- IV Every 12 hours (non-standard times) 04/22/24 1306    04/22/24 1350  vancomycin - pharmacy to dose  (vancomycin IVPB (PEDS and ADULTS))        Placed in "And" Linked " Group    -- IV pharmacy to manage frequency 04/22/24 1251          Antifungals (From admission, onward)      None           Antivirals (From admission, onward)      None            ASSESSMENT and PLAN     Sepsis secondary to large anterior abdominal cellulitis/SSTI in the setting of prior trauma and self-picking behavior - and unrepaired large ventral hernia  Blood cultures x2 sets 4/21 no growth to date, pending final  Procalcitonin 0.99, positive   .1-->80.9, trending down, sed rate 45   Baseline CPK 65  MRSA nasal swab detected     PMHx:  AFib/a flutter on Eliquis, CHF, COPD, diabetes     Obesity, BMI 53.2 kg/m2        RECOMMENDATIONS:   Stop vancomycin IV  Start daptomycin 800mg IV for 7-10 days for anterior abdominal wall cellulitis   Weekly labs CBC w/diff, CMP, CRP, CPK twice a week, follow-up ID clinic/Dr. Juan in 2-3 weeks  Mupirocin both nares twice a day for 5 days  Hibiclenx baths   PT/OT as tolerated  Aspiration precautions    Contact precautions     D/w patient, nursing, Dr Mcneal/Hospitalist     Infectious Disease will sign off, call us back with any questions      SUBJECTIVE    Review of Systems  Negative except as stated above in Interval History     OBJECTIVE   Temp:  [96.9 °F (36.1 °C)-98 °F (36.7 °C)] 97.7 °F (36.5 °C)  Pulse:  [63-79] 64  Resp:  [18-20] 18  SpO2:  [91 %-97 %] 95 %  BP: (122-175)/(59-75) 146/62  Temp:  [96.9 °F (36.1 °C)-98 °F (36.7 °C)]   Temp: 97.7 °F (36.5 °C) (04/24/24 0711)  Pulse: 64 (04/24/24 0711)  Resp: 18 (04/24/24 0711)  BP: (!) 146/62 (04/24/24 0711)  SpO2: 95 % (04/24/24 0711)    Intake/Output Summary (Last 24 hours) at 4/24/2024 0757  Last data filed at 4/24/2024 0746  Gross per 24 hour   Intake 2317.19 ml   Output 850 ml   Net 1467.19 ml       Physical Exam  General:  Older obese lady, sitting in bed breathing comfortable on room air, getting breathing treatments  Eyes: Eyes with no icterus or injection. Vision grossly normal  Ears: Hearing grossly  normal.  Nose: Nares patent  Mouth: Moist mucous membranes, dentition is poor, poor oral hygiene. No ulcerations, erythema or exudates.  Neck: Supple, no tenderness to palpation.  Cardiovascular: Regular rate and rhythm, no murmurs, no edema.    Respiratory:  Clear to auscultation bilaterally, no tachypnea or increased work of breathing.  Gastrointestinal:  Soft with active bowel sounds, no tenderness to palpation, no distention.  Genitourinary:  No suprapubic tenderness.  Musculoskeletal:  Moves all extremities with good strength.  Fingernails are slightly  hyperemic, nontender to palpation   Skin:  Warm, flushed, left lower abdominal cellulitis improving, dressing in place, triad applied, better, indurated area improving  4/23: face is flushed, she has been scratching her face and neck.  impressive left lower abdominal panniculitis, with resolving redness, dressing in place, triad applied yesterday.   4/22: impressive left large anterior abdominal wall cellulitis with multiple excoriated areas, one of them bleeding, others healing,  no active evidence of purulent drainage noted, tender to palpation consistent with ongoing cellulitis, see picture below.    Neuro:   Oriented, conversant, follows commands.  Psych: Good mood, normal affect.   VAD: PIV  ISOLATION:  Contact/MRSA     Wounds:   4/22:    4/21:           Significant Labs: All pertinent labs within the past 24 hours have been reviewed.    CBC LAST 7 DAYS  Recent Labs   Lab 04/21/24  0240 04/22/24  0421 04/23/24  0427 04/24/24  0413   WBC 13.92* 18.11* 7.23 6.02   RBC 4.89 4.37 4.40 4.21   HGB 15.1 13.2 13.3 12.9   HCT 48.4 41.2 42.4 40.0   MCV 99* 94 96 95   MCH 30.9 30.2 30.2 30.6   MCHC 31.2* 32.0 31.4* 32.3   RDW 14.0 14.6* 14.4 14.1    201 204 182   MPV 9.9 10.4 10.5 10.5   GRAN 78.1*  10.9* 77.4*  14.0* 65.0  4.7 54.6  3.3   LYMPH 9.6*  1.3 10.9*  2.0 18.4  1.3 22.9  1.4   MONO 8.5  1.2* 10.3  1.9* 12.9  0.9 16.1*  1.0   BASO 0.04  "0.05 0.03 0.02   NRBC 0 0 0 0       CHEMISTRY LAST 7 DAYS  Recent Labs   Lab 04/21/24  0230 04/21/24  0240 04/22/24 0421 04/23/24 0427 04/24/24 0413   NA  --  136 133* 132* 132*   K  --  4.9 3.5 4.2 4.0   CL  --  96 96 96 96   CO2  --  24 26 26 27   ANIONGAP  --  16 11 10 9   BUN  --  14 18 10 12   CREATININE  --  0.7 0.7 0.6 0.6   GLU  --  119* 107 87 94   CALCIUM  --  10.4 9.6 9.6 9.3   PH 7.484*  --   --   --   --    MG  --  1.8 2.1 1.8 1.8   ALBUMIN  --  3.8  --  2.9* 2.9*   PROT  --  7.7  --  6.5 6.5   ALKPHOS  --  100  --  64 69   ALT  --  19  --  13 14   AST  --  26  --  21 19   BILITOT  --  0.4  --  0.5 0.6       Estimated Creatinine Clearance: 136.3 mL/min (based on SCr of 0.6 mg/dL).    INFLAMMATORY/PROCAL  LAST 7 DAYS  Recent Labs   Lab 04/21/24  0240 04/23/24 0427 04/24/24 0413   PROCAL 0.04 0.99*  --    CRP  --  154.1* 80.9*     No results found for: "ESR"  CRP   Date Value Ref Range Status   04/24/2024 80.9 (H) 0.0 - 8.2 mg/L Final   04/23/2024 154.1 (H) 0.0 - 8.2 mg/L Final   06/26/2023 19.7 (H) 0.0 - 8.2 mg/L Final   06/24/2023 76.2 (H) 0.0 - 8.2 mg/L Final   08/18/2022 174.8 (H) 0.0 - 8.2 mg/L Final   05/08/2019 71.4 (H) 0.0 - 8.2 mg/L Final   05/07/2019 97.6 (H) 0.0 - 8.2 mg/L Final       PRIOR  MICROBIOLOGY:    Susceptibility data from last 90 days.  Collected Specimen Info Organism   02/19/24 Blood from Peripheral, Forearm, Left No growth after 5 days.   02/19/24 Blood from Peripheral, Forearm, Right No growth after 5 days.       LAST 7 DAYS MICROBIOLOGY   Microbiology Results (last 7 days)       Procedure Component Value Units Date/Time    Blood culture x two cultures. Draw prior to antibiotics. [[id number]] Collected: 04/21/24 0240    Order Status: Completed Specimen: Blood from Peripheral, Wrist, Left Updated: 04/23/24 1032     Blood Culture, Routine No Growth to date      No Growth to date      No Growth to date    Narrative:      Aerobic and anaerobic    Blood culture x two cultures. " Draw prior to antibiotics. [[id number]] Collected: 04/21/24 0240    Order Status: Completed Specimen: Blood from Peripheral, Antecubital, Right Updated: 04/23/24 1032     Blood Culture, Routine No Growth to date      No Growth to date      No Growth to date    Narrative:      Aerobic and anaerobic    MRSA Screen by PCR [[id number]]  (Abnormal) Collected: 04/22/24 1313    Order Status: Completed Specimen: Nasal Swab Updated: 04/22/24 2143     MRSA SCREEN BY PCR Positive     Comment: MRSA PCR critical result(s) called and verbal readback obtained from   Josué Hugo RN at [facility]     by KS3 04/22/2024 21:43         Narrative:      MRSA PCR critical result(s) called and verbal readback obtained from   Josué Hugo RN at [facility]     by KS3 04/22/2024 21:43    Influenza A & B by Molecular [[id number]] Collected: 04/21/24 0219    Order Status: Completed Specimen: Nasal Swab Updated: 04/21/24 0309     Influenza A, Molecular Negative     Influenza B, Molecular Negative     Flu A & B Source Nasal swab              CURRENT/PREVIOUS VISIT EKG  Results for orders placed or performed during the hospital encounter of 02/19/24   EKG 12-lead    Collection Time: 02/19/24  1:15 AM   Result Value Ref Range    QRS Duration 112 ms    OHS QTC Calculation 447 ms    Narrative    Test Reason : R00.0,    Vent. Rate : 097 BPM     Atrial Rate : 066 BPM     P-R Int : 000 ms          QRS Dur : 112 ms      QT Int : 352 ms       P-R-T Axes : 000 121 093 degrees     QTc Int : 447 ms    Atrial fibrillation  Right axis deviation  Septal infarct (cited on or before 31-JAN-2023)  Abnormal ECG  When compared with ECG of 22-JUN-2023 14:10,  Atrial fibrillation has replaced Atrial flutter  Confirmed by Sosa ADEN, Prakash LIND ([id number]) on 2/24/2024 6:44:24 AM    Referred By: [email or web address]   SELF           Confirmed By:Prakash Swan MD          Significant Imaging: I have reviewed all relevant and available imaging results/findings within the past 24 hours.    CXR:  Cardiomegaly with likely mild pulmonary edema.     Joanie Browning MD  Date of Service: 04/24/2024      This note was created using [employer] voice recognition software that occasionally misinterpreted phrases or words.

## 2024-04-24 NOTE — PLAN OF CARE
Home health orders and therapy plan sent to [facility]          04/24/24 2727   Post-Acute Status   Post-Acute Authorization Home Health   Home Health Status Set-up Complete/Auth obtained

## 2024-04-24 NOTE — DISCHARGE SUMMARY
[facility] Medicine  Discharge Summary      Patient Name: Nelly Tian  MRN: [id number]  KELVIN: [id number]  Patient Class: IP- Inpatient  Admission Date: 4/21/2024  Hospital Length of Stay: 3 days  Discharge Date and Time:  04/24/2024 3:32 PM  Attending Physician: Daisy att. providers found   Discharging Provider: Garrett Mcneal MD  Primary Care Provider: Constantine Bird MD    Primary Care Team: Networked reference to record PCT     HPI:   Nelly Tian is a 73 year old female with past medical history of AFib/a flutter on Eliquis, CHF, COPD, diabetes mellitus, recurrent abdominal cellulitis, hypertension, iron-deficiency anemia, hyperlipidemia, MI, anxiety, and arthritis who presents with complaints of left lower abdominal wound with orange/yellowish drainage for over a week. She reports developing the wound after scratching her lower abdomen and her daughter has been performing the wound care with dressing changes. She denies chest pain, shortness of breath, fever, chills, dizziness, lightheadedness, abdominal pain, nausea or vomiting. She presents septic in ED with a T-max 100.1, tachypneic, tachycardic, WBC 13.9, lactic acid 1.91, and urinalysis positive WBCs/leukocytes/bacteria culture pending.  Chest x-ray significant for trace pulmonary vascular congestion. She was started on Rocephin and Zosyn.  Wound care consult placed.  Admitted to hospital medicine for further management and treatment.         * No surgery found *      Hospital Course:   Patient got admitted due to sepsis and abdominal wall cellulitis.  Blood cultures ordered.  Patient was initially started on IV Zosyn which was later switched to IV vancomycin.  Surgery was consulted who evaluated the patient and advised conservative management.  Surgery consulted ID.  Nasal MRSA came positive, mupirocin started.  During hospitalization, patient developed facial itching and redness which she said is related to nasal  cannula tubing, patient received p.r.n. Benadryl. Patients facial rash and abdominal redness significantly improved during hospitalization. Midline placed on 4/24, and ID advised IV Daptomycin 800 mg daily for 10 days through May 4th then stop. Patient is also advised to get midline out after last dose of IV antibitoic, and blood tests CBC with Diff, CMP, CPK, and CRP every Mondays and Thursdays while on IV antibiotic per ID. Follow up with PCP and Infectious Disease doctor.     Goals of Care Treatment Preferences:  Code Status: Full Code          What is most important right now is to focus on remaining as independent as possible, symptom/pain control, quality of life, even if it means sacrificing a little time.  Accordingly, we have decided that the best plan to meet the patient's goals includes continuing with treatment.      Consults:   Consults (From admission, onward)          Status Ordering Provider     Inpatient consult to Midline team  Once        Provider:  (Not yet assigned)    Completed VIVIENNE MAGALLON     Inpatient consult to Infectious Diseases  Once        Provider:  Vivienne Magallon MD    Completed RAISA PHAN     Inpatient consult to General Surgery  Once        Provider:  Raisa Phan MD    Completed MARCIE MOLINA     Inpatient consult to Social Work  Once        Provider:  (Not yet assigned)    Acknowledged VASU BOLES            No new Assessment & Plan notes have been filed under this hospital service since the last note was generated.  Service: Hospital Medicine    Final Active Diagnoses:    Diagnosis Date Noted POA    PRINCIPAL PROBLEM:  Severe sepsis [A41.9, R65.20] 01/20/2019 Yes    Cystitis [N30.90] 04/21/2024 Yes    Abdominal wall cellulitis [L03.311] 05/04/2019 Yes    Chronic obstructive pulmonary disease (COPD) [J44.9] 04/05/2017 Yes    Type 2 diabetes mellitus with diabetic neuropathy, without long-term current use of insulin [E11.40]  11/02/2016 Yes    Hypertension associated with diabetes [E11.59, I15.2] 01/19/2016 Yes    Chronic atrial fibrillation [I48.20] 11/10/2015 Yes      Problems Resolved During this Admission:       Discharged Condition: stable    Disposition: Home or Self Care    Follow Up:   Follow-up Information       Constantine Bird MD Follow up.    Specialty: Family Medicine  Why: 4/30 @ 10 am with NP  Contact information:  [address] [address]  [phone]               Melina Juan MD Follow up.    Specialty: Infectious Diseases  Why: 5/2 @ 8:30  Contact information:  [address]  [address]  [facility] [address]  [phone]               [facility] Follow up.    Why: home infusion company  Contact information:  [address]  [facility] [address]  [phone]             [facility] Follow up.    Specialties: Home Health Services, Home Therapy Services, Home Living Aide Services  Why: 1st visit tomorrow 4/25  Contact information:  [address] [address]  [phone]                         Patient Instructions:      CK   Standing Status: Standing Number of Occurrences: 4 Standing Exp. Date: 06/23/25     C-Reactive Protein   Standing Status: Standing Number of Occurrences: 2 Standing Exp. Date: 06/23/25     Comprehensive Metabolic Panel   Standing Status: Standing Number of Occurrences: 2 Standing Exp. Date: 06/23/25     CBC Auto Differential   Standing Status: Standing Number of Occurrences: 2 Standing Exp. Date: 06/23/25     CBC Auto Differential   Standing Status: Future Standing Exp. Date: 05/24/24     Comprehensive Metabolic Panel   Standing Status: Future Standing Exp. Date: 05/24/24     CK   Standing Status: Future Standing Exp. Date: 05/24/24     C-reactive protein   Standing Status: Future Standing Exp. Date: 05/24/24     Ambulatory referral/consult to Home Health   Standing Status: Future   Referral Priority: Routine Referral Type:  Home Health   Referral Reason: Specialty Services Required   Requested Specialty: Home Health Services   Number of Visits Requested: 1     Activity as tolerated       Significant Diagnostic Studies: Labs: All labs within the past 24 hours have been reviewed    Pending Diagnostic Studies:       Procedure Component Value Units Date/Time    EKG 12-lead [[id number]]     Order Status: Sent Lab Status: No result            Medications:  Reconciled Home Medications:      Medication List        START taking these medications      sodium chloride 0.9% SolP 50 mL with DAPTOmycin 500 mg SolR 800 mg  Inject 800 mg into the vein once daily. for 10 days            CHANGE how you take these medications      budesonide 0.25 mg/2 mL nebulizer solution  Commonly known as: PULMICORT  INHALE THE CONTENTS OF 1 VIAL VIA NEBULIZER ONCE DAILY. CONTROLLER  What changed: See the new instructions.            CONTINUE taking these medications      albuterol 90 mcg/actuation inhaler  Commonly known as: PROVENTIL/VENTOLIN HFA  INHALE 2 PUFFS EVERY 6 HOURS AS NEEDED FOR WHEEZING (RESCUE)     albuterol-ipratropium 2.5 mg-0.5 mg/3 mL nebulizer solution  Commonly known as: DUO-NEB  Take 3 mLs by nebulization every 6 (six) hours as needed for Wheezing. Rescue     ascorbic acid (vitamin C) 500 MG tablet  Commonly known as: VITAMIN C  Take 2 tablets (1,000 mg total) by mouth every evening.     atorvastatin 10 MG tablet  Commonly known as: LIPITOR  TAKE 1 TABLET(10 MG) BY MOUTH EVERY OTHER DAY     blood sugar diagnostic Strp  To check BG 2 times daily, to use with insurance preferred meter     blood-glucose meter kit  To check BG 2 times daily, to use with insurance preferred meter     ELIQUIS 5 mg Tab  Generic drug: apixaban  TAKE 1 TABLET TWICE DAILY     empagliflozin 25 mg tablet  Commonly known as: Jardiance  Take 1 tablet (25 mg total) by mouth once daily.     fluticasone propionate 50 mcg/actuation nasal spray  Commonly known as: FLONASE  SHAKE  LIQUID AND USE 1 SPRAY IN EACH NOSTRIL EVERY DAY     furosemide 40 MG tablet  Commonly known as: LASIX  Take 1 tablet (40 mg total) by mouth once daily.     gabapentin 800 MG tablet  Commonly known as: NEURONTIN  TAKE 1 TABLET(800 MG) BY MOUTH THREE TIMES DAILY     HYDROcodone-acetaminophen 7.5-325 mg per tablet  Commonly known as: NORCO  Take 1 tablet by mouth every 12 (twelve) hours as needed for Pain. Medical necessary for greater than 7 days for chronic pain.     hydrOXYzine 50 MG tablet  Commonly known as: ATARAX  Take 1 tablet (50 mg total) by mouth nightly as needed for Itching.     Lactobacillus acidophilus 1 billion cell Cap  Take 1 capsule by mouth 3 (three) times daily with meals.     lactulose 20 gram/30 mL Soln  Commonly known as: CHRONULAC  Take 15 mLs (10 g total) by mouth once daily.     lancets Misc  To check BG 2 times daily, to use with insurance preferred meter     LIDOcaine 5 %  Commonly known as: LIDODERM  APPLY PATCH ONTO THE SKIN. REMOVE AND DISCARD PATCH WITHIN 12 HOURS OR AS DIRECTED BY MD     lisinopriL 20 MG tablet  Commonly known as: PRINIVIL,ZESTRIL  Take 1 tablet (20 mg total) by mouth once daily.     melatonin 10 mg Tab  Take 10 mg by mouth nightly.     metFORMIN 500 MG tablet  Commonly known as: GLUCOPHAGE  TAKE 1 TABLET EVERY DAY WITH BREAKFAST     MOUNJARO 2.5 mg/0.5 mL Pnij  Generic drug: tirzepatide  Inject 2.5 mg into the skin every 7 days.     multivitamin tablet  Commonly known as: THERAGRAN  Take 1 tablet by mouth once daily.     nystatin powder  Commonly known as: NYSTOP  APPLY TOPICALLY TO THE AFFECTED AREA TWICE DAILY     nystatin-triamcinolone cream  Commonly known as: MYCOLOG II  Apply topically 3 (three) times daily. Apply to affected area     ondansetron 8 MG Tbdl  Commonly known as: ZOFRAN-ODT  Take 1 tablet (8 mg total) by mouth every 12 (twelve) hours as needed.     potassium chloride SA 20 MEQ tablet  Commonly known as: K-DUR,KLOR-CON  Take 1 tablet (20 mEq total)  by mouth once daily.     spironolactone 25 MG tablet  Commonly known as: ALDACTONE  Take 1 tablet (25 mg total) by mouth 3 (three) times a week.     TRELEGY ELLIPTA 100-62.5-25 mcg Dsdv  Generic drug: fluticasone-umeclidin-vilanter  INHALE 1 PUFF EVERY DAY. EXPIRES 42 DAYS AFTER OPENING FOIL TRAY              Indwelling Lines/Drains at time of discharge:   Lines/Drains/Airways       None                   Time spent on the discharge of patient: 33 minutes         Garrett Mcneal MD  Department of Hospital Medicine  [facility]/Surg

## 2024-04-24 NOTE — RESPIRATORY THERAPY
04/24/24 0802   Patient Assessment/Suction   Level of Consciousness (AVPU) alert   Respiratory Effort Unlabored;Normal   Expansion/Accessory Muscles/Retractions no use of accessory muscles;no retractions;expansion symmetric   All Lung Fields Breath Sounds Anterior:;Lateral:;diminished   Rhythm/Pattern, Respiratory depth regular;unlabored;pattern regular;no shortness of breath reported   Cough Frequency infrequent   Cough Type good;nonproductive;loose   Skin Integrity   $ Wound Care Tech Time 15 min   Area Observed Left;Right;Behind ear;Cheek;Upper lip;Nares   Skin Appearance without discoloration   PRE-TX-O2   Device (Oxygen Therapy) room air  (pt will put NC back on after eating)   $ Is the patient on Low Flow Oxygen? Yes   Flow (L/min) (Oxygen Therapy) 2   Oxygen Concentration (%) 28   SpO2 (!) 92 %  (room air)   Pulse Oximetry Type Intermittent   $ Pulse Oximetry - Multiple Charge Pulse Oximetry - Multiple   Pulse 78   Resp 18   Positioning Sitting in bed   Aerosol Therapy   $ Aerosol Therapy Charges Aerosol Treatment   Respiratory Treatment Status (SVN) given  (duoneb)   Treatment Route (SVN) oxygen;mask   Patient Position sitting on edge of bed   Post Treatment Assessment (SVN) breath sounds unchanged   Signs of Intolerance (SVN) none   Breath Sounds Post-Respiratory Treatment   Post-treatment Heart Rate (beats/min) 78   Post-treatment Resp Rate (breaths/min) 18   Ready to Wean/Extubation Screen   FIO2<=50 (chart decimal) 0.28

## 2024-04-24 NOTE — PLAN OF CARE
Problem: Adult Inpatient Plan of Care  Goal: Plan of Care Review  Outcome: Progressing  Goal: Patient-Specific Goal (Individualized)  Outcome: Progressing  Goal: Optimal Comfort and Wellbeing  Outcome: Progressing     Problem: Skin Injury Risk Increased  Goal: Skin Health and Integrity  Outcome: Progressing     Problem: Diabetes Comorbidity  Goal: Blood Glucose Level Within Targeted Range  Outcome: Progressing     Problem: Adjustment to Illness (Sepsis/Septic Shock)  Goal: Optimal Coping  Outcome: Progressing     Problem: Bleeding (Sepsis/Septic Shock)  Goal: Absence of Bleeding  Outcome: Progressing     Problem: Glycemic Control Impaired (Sepsis/Septic Shock)  Goal: Blood Glucose Level Within Desired Range  Outcome: Progressing     Problem: Infection Progression (Sepsis/Septic Shock)  Goal: Absence of Infection Signs and Symptoms  Outcome: Progressing     Problem: Infection (Pneumonia)  Goal: Resolution of Infection Signs and Symptoms  Outcome: Progressing     Problem: Respiratory Compromise (Pneumonia)  Goal: Effective Oxygenation and Ventilation  Outcome: Progressing     Problem: Infection  Goal: Absence of Infection Signs and Symptoms  Outcome: Progressing     Problem: Fall Injury Risk  Goal: Absence of Fall and Fall-Related Injury  Outcome: Progressing     Problem: UTI (Urinary Tract Infection)  Goal: Improved Infection Symptoms  Outcome: Progressing     Problem: Skin or Soft Tissue Infection  Goal: Absence of Infection Signs and Symptoms  Outcome: Progressing     Problem: Wound  Goal: Optimal Coping  Outcome: Progressing  Goal: Optimal Pain Control and Function  Outcome: Progressing  Goal: Skin Health and Integrity  Outcome: Progressing  Goal: Optimal Wound Healing  Outcome: Progressing

## 2024-04-24 NOTE — PT/OT/SLP PROGRESS
Physical Therapy      Patient Name:  Nelly Tian   MRN:  [id number]    Patient not seen today secondary to Patient unwilling to participate (Refused therapy. Reports being able to mobilize without difficulty.  Nurse informed.). Will follow-up .

## 2024-04-24 NOTE — PROGRESS NOTES
Nelly Tian [id number] is a 73 y.o. female who has been consulted for vancomycin dosing.    Pharmacy consult for vancomycin dosing in no longer required.  Vancomycin was discontinued.    Thank you for allowing us to participate in this patient's care.     Dwayne Sellers, PharmD  [phone]

## 2024-04-24 NOTE — PLAN OF CARE
Therapy plan sent to [facility] and bioscript.  Bioscript to come to the hospital today for teaching. (Will also be coordinated with pt's daughter)  Discussed with pt at bedside         04/24/24 1140   Post-Acute Status   Post-Acute Authorization Home Health;IV Infusion

## 2024-04-24 NOTE — PLAN OF CARE
Home infusion teaching complete.  Pt is clear for dc from [facility]. Discharging home with [facility] and bioscript    Appts on avs           04/24/24 9846   Final Note   Assessment Type Final Discharge Note   Anticipated Discharge Disposition Home-Health   Hospital Resources/Appts/Education Provided Appointments scheduled and added to AVS;Post-Acute resouces added to AVS   Post-Acute Status   Post-Acute Authorization Home Health;IV Infusion   Home Health Status Set-up Complete/Auth obtained   IV Infusion Status Set-up Complete/Auth obtained

## 2024-04-24 NOTE — CARE UPDATE
04/24/24 1333   Patient Assessment/Suction   Level of Consciousness (AVPU) alert   Respiratory Effort Normal;Unlabored   Expansion/Accessory Muscles/Retractions no use of accessory muscles;no retractions;expansion symmetric   All Lung Fields Breath Sounds Anterior:;Lateral:   PONCHO Breath Sounds diminished   Rhythm/Pattern, Respiratory unlabored;pattern regular;depth regular   Cough Frequency infrequent   PRE-TX-O2   Device (Oxygen Therapy) nasal cannula   $ Is the patient on Low Flow Oxygen? Yes   Flow (L/min) (Oxygen Therapy) 2   SpO2 95 %   Pulse Oximetry Type Intermittent   $ Pulse Oximetry - Multiple Charge Pulse Oximetry - Multiple   Pulse 64   Resp 18   Positioning HOB elevated 45 degrees   Aerosol Therapy   $ Aerosol Therapy Charges Aerosol Treatment   Respiratory Treatment Status (SVN) given   Treatment Route (SVN) mask;oxygen   Patient Position HOB elevated   Post Treatment Assessment (SVN) increased aeration   Signs of Intolerance (SVN) none

## 2024-04-24 NOTE — PLAN OF CARE
POC reviewed VSS afebrile denies pain tele monitored blood glucose monitored Nasal MRSA came positive, mupirocin started.  patient developed facial itching and redness which she said is related to nasal cannula tubing, patient received p.r.n. Benadryl.

## 2024-04-24 NOTE — NURSING
AVS reviewed with patient and daughter, all questions answered, no needs voiced at this time. Home infusion teaching completed per infusion company nurse. Midline catheter to TRAE left in place for home antibiotics. Telemetry monitor removed and returned to monitor room. Discharged via wheelchair at discharge ramp to care of daughter.

## 2024-04-24 NOTE — PLAN OF CARE
[facility]    PATIENT NAME: Nelly Tian  MRN: [id number]  TODAY'S DATE: 04/24/2024  ADMIT DATE: 4/21/2024  LENGTH OF STAY: 3 DAYS  PROJECTED DISCHARGE DATE:     OUTPATIENT ANTIBIOTIC THERAPY PLAN:       Case Management: Please send referral to Home Health and/or Home Infusion Agencies     1) Diagnosis:  Left lower abdominal cellulitis     2) Discharge Antibiotics:  Intravenous Antibiotics:  Daptomycin 800 mg IV daily for 7-10 days through May 4th      3) Intravenous Access:  Midline    4) Therapy Duration:    Estimated end date of IV antibiotics:  May 4th     5) Outpatient Weekly Labs for duration of antibiotic therapy:  Order the following labs to be drawn on Mondays:  CBC with Diff, CMP, CPK, and CRP  Order the following labs to be drawn on Mondays and Thursdays:  CPK    6) Fax Lab Results to Infectious Diseases Provider if not done at [facility] facility:   [facility] Fax Number: [phone], Attn: Dr Melina Juan     7) Outpatient Infectious Diseases Follow-up:  Follow-up appointment will be arranged by the ID clinic and will be found in the patient's appointments tab.  Prior to discharge, please ensure the patient's follow-up has been scheduled.    If there is still no follow-up scheduled prior to discharge, please send an EPIC message to Caroline Shi in [facility].        Joanie Browning MD 04/24/2024  9:24 AM  [facility]    This note was created using [employer]  voice recognition software that occasionally misinterpreted phrases or words.

## 2024-04-24 NOTE — RESPIRATORY THERAPY
04/24/24 0802   PRE-TX-O2   Device (Oxygen Therapy) room air  (pt will put NC back on after eating)   $ Is the patient on Low Flow Oxygen? Yes   Flow (L/min) (Oxygen Therapy) 2   Oxygen Concentration (%) 28   SpO2 (!) 92 %  (room air)   Pulse Oximetry Type Intermittent   $ Pulse Oximetry - Multiple Charge Pulse Oximetry - Multiple   Pulse 78   Resp 18   Positioning Sitting in bed   Aerosol Therapy   $ Aerosol Therapy Charges Aerosol Treatment   Respiratory Treatment Status (SVN) given  (duoneb)   Treatment Route (SVN) oxygen;mask   Patient Position sitting on edge of bed   Post Treatment Assessment (SVN) breath sounds unchanged   Signs of Intolerance (SVN) none   Breath Sounds Post-Respiratory Treatment   Post-treatment Heart Rate (beats/min) 78   Post-treatment Resp Rate (breaths/min) 18   Ready to Wean/Extubation Screen   FIO2<=50 (chart decimal) 0.28

## 2024-04-25 LAB
OHS QRS DURATION: 132 MS
OHS QRS DURATION: 92 MS
OHS QTC CALCULATION: 442 MS
OHS QTC CALCULATION: 453 MS

## 2024-04-26 ENCOUNTER — PATIENT OUTREACH (OUTPATIENT)
Dept: ADMINISTRATIVE | Facility: CLINIC | Age: 74
End: 2024-04-26
Payer: MEDICARE

## 2024-04-26 LAB
BACTERIA BLD CULT: NORMAL
BACTERIA BLD CULT: NORMAL

## 2024-04-29 ENCOUNTER — HOSPITAL ENCOUNTER (EMERGENCY)
Facility: HOSPITAL | Age: 74
Discharge: HOME | End: 2024-04-29
Attending: EMERGENCY MEDICINE
Payer: MEDICARE

## 2024-04-29 ENCOUNTER — PATIENT MESSAGE (OUTPATIENT)
Dept: ADMINISTRATIVE | Facility: CLINIC | Age: 74
End: 2024-04-29
Payer: MEDICARE

## 2024-04-29 ENCOUNTER — TELEPHONE (OUTPATIENT)
Dept: INFECTIOUS DISEASES | Facility: CLINIC | Age: 74
End: 2024-04-29

## 2024-04-29 VITALS
BODY MASS INDEX: 44.41 KG/M2 | SYSTOLIC BLOOD PRESSURE: 136 MMHG | OXYGEN SATURATION: 97 % | TEMPERATURE: 99 F | RESPIRATION RATE: 18 BRPM | HEIGHT: 68 IN | WEIGHT: 293 LBS | HEART RATE: 68 BPM | DIASTOLIC BLOOD PRESSURE: 63 MMHG

## 2024-04-29 DIAGNOSIS — Z78.9 PROBLEM WITH VASCULAR ACCESS: Primary | ICD-10-CM

## 2024-04-29 PROCEDURE — 99281 EMR DPT VST MAYX REQ PHY/QHP: CPT

## 2024-04-29 NOTE — FIRST PROVIDER EVALUATION
" Emergency Department TeleTriage Encounter Note      CHIEF COMPLAINT    Chief Complaint   Patient presents with    Vascular Access Problem     States nurse told her PICC "line tip is broken"       VITAL SIGNS   Initial Vitals   BP Pulse Resp Temp SpO2   04/29/24 1137 04/29/24 1138 04/29/24 1137 04/29/24 1137 04/29/24 1138   136/63 68 18 98.6 °F (37 °C) 97 %      MAP       --                   ALLERGIES    Review of patient's allergies indicates:   Allergen Reactions    Dilaudid [hydromorphone] Anaphylaxis     Other reaction(s): Anaphylaxis  Other reaction(s): Unknown    Zyvox [linezolid in dextrose 5%] Shortness Of Breath       PROVIDER TRIAGE NOTE  Patient presents reporting the tip of her picc line is broken. She is due for next antibiotics at 3pm      ORDERS  Labs Reviewed - No data to display    ED Orders (720h ago, onward)      None              Virtual Visit Note: The provider triage portion of this emergency department evaluation and documentation was performed via [employer], a HIPAA-compliant telemedicine application, in concert with a tele-presenter in the room. A face to face patient evaluation with one of my colleagues will occur once the patient is placed in an emergency department room.      DISCLAIMER: This note was prepared with [employer] voice recognition transcription software. Garbled syntax, mangled pronouns, and other bizarre constructions may be attributed to that software system.    "

## 2024-04-29 NOTE — PROGRESS NOTES
C3 nurse attempted to contact Nelly Tian for a TCC post hospital discharge follow up call. No answer. Left voicemail with callback information. The patient has a scheduled HOSFU appointment with Jonh Navarro NP  on 04/30/24 @ 1000.

## 2024-04-29 NOTE — PROGRESS NOTES
Patient is being seen in ED at this time due to PICC line issues. Will follow up on patient status later.

## 2024-04-29 NOTE — ED PROVIDER NOTES
"Encounter Date: 4/29/2024       History     Chief Complaint   Patient presents with    Vascular Access Problem     States nurse told her PICC "line tip is broken"     Patient presents emergency department with reported trouble with her PICC line states the home health nurse told her that her infusion line tip was malfunction with a broken she presents emergency department for further evaluation patient is currently receiving IV antibiotics as an outpatient for treatment infection on her flank she states she is former days of antibiotics reports she is finished        Review of patient's allergies indicates:   Allergen Reactions    Dilaudid [hydromorphone] Anaphylaxis     Other reaction(s): Anaphylaxis  Other reaction(s): Unknown    Zyvox [linezolid in dextrose 5%] Shortness Of Breath     Past Medical History:   Diagnosis Date    *Atrial flutter     Angina pectoris 9/18/2017    Anxiety     Arthritis     Asthma     Atrial fibrillation     Back pain     Cataract     OD    Chest pain 9/17/2017    CHF (congestive heart failure)     Chronically on benzodiazepine therapy 5/4/2019    COPD (chronic obstructive pulmonary disease)     Depression     Diabetes mellitus     Emphysema of lung     Heart failure     Hepatomegaly 2/3/2016    Hernia     History of MI (myocardial infarction) 1/19/2016    Hypercapnic respiratory failure, chronic 11/16/2016    Hyperlipidemia     Hypertension     Iron deficiency anemia 2/3/2016    Myocardial infarction     Obesity     Peripheral vascular disease     Pneumonia     Polyneuropathy     Retinal detachment     OS    Septic shock 4/23/2017    Skin ulcer 3/18/2017    Tobacco dependence     Type II 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, not stated as uncontrolled(250.60)      Past Surgical History:   Procedure Laterality Date    BREAST BIOPSY Left 10 yrs ago    benign    CATARACT EXTRACTION Left     OS     CATARACT EXTRACTION W/  INTRAOCULAR LENS IMPLANT Right " 2023    Procedure: CEIOL OD;  Surgeon: David Bautista MD;  Location: [facility] OR;  Service: Ophthalmology;  Laterality: Right;     SECTION      x2    EYE SURGERY      HYSTERECTOMY      partial    OOPHORECTOMY      one ovary conserved    RETINAL DETACHMENT SURGERY      buckle --OS    TONSILLECTOMY       Family History   Problem Relation Name Age of Onset    Alcohol abuse Mother      Cirrhosis Mother      Arthritis Father      Heart disease Father      Heart attack Father      Arrhythmia Father      Coronary artery disease Father      Coronary artery disease Sister      Early death Sister  30        heart disease    Heart disease Sister  32        MI    Heart attack Sister      Arthritis Sister      Heart disease Sister      Crohn's disease Sister      Cancer Brother          Lung cancer    Lung cancer Brother      No Known Problems Brother      No Known Problems Daughter x1     Blindness Son x1         due to accident//    Breast cancer Neg Hx      Glaucoma Neg Hx      Macular degeneration Neg Hx      Retinal detachment Neg Hx      Amblyopia Neg Hx      Diabetes Neg Hx      Cataracts Neg Hx      Hypertension Neg Hx      Strabismus Neg Hx      Stroke Neg Hx      Thyroid disease Neg Hx       Social History     Tobacco Use    Smoking status: Former     Current packs/day: 0.00     Average packs/day: 0.3 packs/day for 54.4 years (16.3 ttl pk-yrs)     Types: Cigarettes     Start date: 1968     Quit date: 2022     Years since quittin.8    Smokeless tobacco: Never   Substance Use Topics    Alcohol use: No     Alcohol/week: 0.0 standard drinks of alcohol    Drug use: No     Review of Systems   All other systems reviewed and are negative.      Physical Exam     Initial Vitals   BP Pulse Resp Temp SpO2   24 1137 24 1138 24 1137 24 1137 24 1138   136/63 68 18 98.6 °F (37 °C) 97 %      MAP       --                Physical Exam    Constitutional: She appears well-developed  and well-nourished. No distress.   HENT:   Head: Normocephalic and atraumatic.   Right Ear: External ear normal.   Left Ear: External ear normal.   Mouth/Throat: Oropharynx is clear and moist.   Eyes: Conjunctivae and EOM are normal. Pupils are equal, round, and reactive to light.   Cardiovascular:  Normal rate, regular rhythm, normal heart sounds and intact distal pulses.           Pulmonary/Chest: Breath sounds normal. No respiratory distress.   Musculoskeletal:         General: No edema. Normal range of motion.      Comments: PICC line to right upper arm no surrounding erythema or induration     Neurological: She is alert and oriented to person, place, and time. GCS score is 15. GCS eye subscore is 4. GCS verbal subscore is 5. GCS motor subscore is 6.   Skin: Skin is warm and dry. Capillary refill takes less than 2 seconds. No rash noted.   Psychiatric: She has a normal mood and affect. Her behavior is normal.         ED Course   Procedures  Labs Reviewed - No data to display       Imaging Results    None          Medications - No data to display  Medical Decision Making  Manuel GRANT has arrived in the emergency department and has repaired patient's PICC line will release patient for continuation of her therapy outpatient follow-up                                      Clinical Impression:  Final diagnoses:  [Z78.9] Problem with vascular access (Primary)          ED Disposition Condition    Discharge Stable          ED Prescriptions    None       Follow-up Information       Follow up With Specialties Details Why Contact Info    Constantine Bird MD Family Medicine Call today for re-examination of your symptoms [address]  [place] [address]  [phone]               Kavin Hooker MD  04/29/24 2410

## 2024-04-30 NOTE — PROGRESS NOTES
2nd attempt made to reach patient for TCC call. Left voicemail please call [phone] and leave first name, last name and  for Artie. I will return your call.

## 2024-04-30 NOTE — TELEPHONE ENCOUNTER
----- Message from Caroline Shi, Patient Care Assistant sent at 4/29/2024  9:24 AM CDT -----  Regarding: Appointment  Hi,    Patient called to reschedule hospital follow up appointment scheduled on 5/2/24 due to no transportation. Please advise.    Thanks!  Caroline

## 2024-05-03 ENCOUNTER — LAB VISIT (OUTPATIENT)
Dept: LAB | Facility: HOSPITAL | Age: 74
End: 2024-05-03
Attending: INTERNAL MEDICINE
Payer: MEDICARE

## 2024-05-03 DIAGNOSIS — F42.4 SKIN PICKING HABIT: ICD-10-CM

## 2024-05-03 DIAGNOSIS — L03.311 CELLULITIS OF ABDOMINAL WALL: ICD-10-CM

## 2024-05-03 DIAGNOSIS — E11.42 DIABETIC POLYNEUROPATHY: ICD-10-CM

## 2024-05-03 DIAGNOSIS — A41.9 SEPTICEMIA DURING LABOR: Primary | ICD-10-CM

## 2024-05-03 DIAGNOSIS — N30.90 DIVERTICULITIS OF BLADDER: ICD-10-CM

## 2024-05-03 DIAGNOSIS — S31.109A OPEN WOUND OF ABDOMINAL WALL, ANTERIOR, COMPLICATED: ICD-10-CM

## 2024-05-03 LAB — CK SERPL-CCNC: 41 U/L (ref 20–180)

## 2024-05-03 PROCEDURE — 82550 ASSAY OF CK (CPK): CPT | Performed by: INTERNAL MEDICINE

## 2024-05-03 PROCEDURE — 36415 COLL VENOUS BLD VENIPUNCTURE: CPT | Performed by: INTERNAL MEDICINE

## 2024-05-06 ENCOUNTER — OFFICE VISIT (OUTPATIENT)
Dept: INFECTIOUS DISEASES | Facility: CLINIC | Age: 74
End: 2024-05-06
Payer: MEDICARE

## 2024-05-06 VITALS — WEIGHT: 293 LBS | BODY MASS INDEX: 44.41 KG/M2 | HEIGHT: 68 IN

## 2024-05-06 DIAGNOSIS — B37.2 YEAST DERMATITIS: ICD-10-CM

## 2024-05-06 DIAGNOSIS — E11.40 TYPE 2 DIABETES MELLITUS WITH DIABETIC NEUROPATHY, WITHOUT LONG-TERM CURRENT USE OF INSULIN: ICD-10-CM

## 2024-05-06 DIAGNOSIS — R91.1 LUNG NODULE: ICD-10-CM

## 2024-05-06 DIAGNOSIS — M79.3 PANNICULITIS: ICD-10-CM

## 2024-05-06 DIAGNOSIS — L03.90 RECURRENT CELLULITIS: Primary | ICD-10-CM

## 2024-05-06 PROCEDURE — 3044F HG A1C LEVEL LT 7.0%: CPT | Mod: CPTII,S$GLB,, | Performed by: INTERNAL MEDICINE

## 2024-05-06 PROCEDURE — 1111F DSCHRG MED/CURRENT MED MERGE: CPT | Mod: CPTII,S$GLB,, | Performed by: INTERNAL MEDICINE

## 2024-05-06 PROCEDURE — 3288F FALL RISK ASSESSMENT DOCD: CPT | Mod: CPTII,S$GLB,, | Performed by: INTERNAL MEDICINE

## 2024-05-06 PROCEDURE — 1101F PT FALLS ASSESS-DOCD LE1/YR: CPT | Mod: CPTII,S$GLB,, | Performed by: INTERNAL MEDICINE

## 2024-05-06 PROCEDURE — 3008F BODY MASS INDEX DOCD: CPT | Mod: CPTII,S$GLB,, | Performed by: INTERNAL MEDICINE

## 2024-05-06 PROCEDURE — 99215 OFFICE O/P EST HI 40 MIN: CPT | Mod: S$GLB,,, | Performed by: INTERNAL MEDICINE

## 2024-05-06 PROCEDURE — 1157F ADVNC CARE PLAN IN RCRD: CPT | Mod: CPTII,S$GLB,, | Performed by: INTERNAL MEDICINE

## 2024-05-06 PROCEDURE — 1125F AMNT PAIN NOTED PAIN PRSNT: CPT | Mod: CPTII,S$GLB,, | Performed by: INTERNAL MEDICINE

## 2024-05-06 PROCEDURE — 4010F ACE/ARB THERAPY RXD/TAKEN: CPT | Mod: CPTII,S$GLB,, | Performed by: INTERNAL MEDICINE

## 2024-05-06 PROCEDURE — 1159F MED LIST DOCD IN RCRD: CPT | Mod: CPTII,S$GLB,, | Performed by: INTERNAL MEDICINE

## 2024-05-06 PROCEDURE — 3072F LOW RISK FOR RETINOPATHY: CPT | Mod: CPTII,S$GLB,, | Performed by: INTERNAL MEDICINE

## 2024-05-06 RX ORDER — CEFADROXIL 500 MG/1
500 CAPSULE ORAL EVERY 12 HOURS
Qty: 180 CAPSULE | Refills: 3 | Status: SHIPPED | OUTPATIENT
Start: 2024-05-06 | End: 2025-05-06

## 2024-05-06 RX ORDER — FLUCONAZOLE 100 MG/1
200 TABLET ORAL DAILY
Qty: 14 TABLET | Refills: 0 | Status: SHIPPED | OUTPATIENT
Start: 2024-05-06 | End: 2024-08-04

## 2024-05-06 RX ORDER — NYSTATIN 100000 [USP'U]/G
POWDER TOPICAL
Qty: 120 G | Refills: 11 | Status: SHIPPED | OUTPATIENT
Start: 2024-05-06
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
## 2024-05-07 ENCOUNTER — EXTERNAL HOME HEALTH (OUTPATIENT)
Dept: HOME HEALTH SERVICES | Facility: HOSPITAL | Age: 74
End: 2024-05-07

## 2024-05-08 ENCOUNTER — OFFICE VISIT (OUTPATIENT)
Dept: PRIMARY CARE CLINIC | Facility: CLINIC | Age: 74
End: 2024-05-08
Payer: MEDICARE

## 2024-05-08 VITALS
SYSTOLIC BLOOD PRESSURE: 130 MMHG | TEMPERATURE: 98 F | DIASTOLIC BLOOD PRESSURE: 70 MMHG | HEIGHT: 68 IN | OXYGEN SATURATION: 96 % | WEIGHT: 265.88 LBS | BODY MASS INDEX: 40.3 KG/M2 | HEART RATE: 82 BPM

## 2024-05-08 DIAGNOSIS — G47.00 INSOMNIA, UNSPECIFIED TYPE: ICD-10-CM

## 2024-05-08 DIAGNOSIS — I15.2 HYPERTENSION ASSOCIATED WITH DIABETES: ICD-10-CM

## 2024-05-08 DIAGNOSIS — F33.9 MAJOR DEPRESSION, RECURRENT, CHRONIC: ICD-10-CM

## 2024-05-08 DIAGNOSIS — I48.20 CHRONIC ATRIAL FIBRILLATION: ICD-10-CM

## 2024-05-08 DIAGNOSIS — E11.59 HYPERTENSION ASSOCIATED WITH DIABETES: ICD-10-CM

## 2024-05-08 DIAGNOSIS — E11.51 TYPE 2 DIABETES MELLITUS WITH DIABETIC PERIPHERAL ANGIOPATHY WITHOUT GANGRENE, WITHOUT LONG-TERM CURRENT USE OF INSULIN: Primary | ICD-10-CM

## 2024-05-08 DIAGNOSIS — I50.22 CHRONIC SYSTOLIC CONGESTIVE HEART FAILURE: ICD-10-CM

## 2024-05-08 DIAGNOSIS — J96.01 ACUTE RESPIRATORY FAILURE WITH HYPOXEMIA: ICD-10-CM

## 2024-05-08 DIAGNOSIS — M51.36 LUMBAR DEGENERATIVE DISC DISEASE: ICD-10-CM

## 2024-05-08 DIAGNOSIS — R26.2 DISABILITY OF WALKING: ICD-10-CM

## 2024-05-08 DIAGNOSIS — E66.01 OBESITY, MORBID, BMI 40.0-49.9: ICD-10-CM

## 2024-05-08 PROCEDURE — 3075F SYST BP GE 130 - 139MM HG: CPT | Mod: CPTII,S$GLB,, | Performed by: NURSE PRACTITIONER

## 2024-05-08 PROCEDURE — 1159F MED LIST DOCD IN RCRD: CPT | Mod: CPTII,S$GLB,, | Performed by: NURSE PRACTITIONER

## 2024-05-08 PROCEDURE — 1125F AMNT PAIN NOTED PAIN PRSNT: CPT | Mod: CPTII,S$GLB,, | Performed by: NURSE PRACTITIONER

## 2024-05-08 PROCEDURE — 3288F FALL RISK ASSESSMENT DOCD: CPT | Mod: CPTII,S$GLB,, | Performed by: NURSE PRACTITIONER

## 2024-05-08 PROCEDURE — 3078F DIAST BP <80 MM HG: CPT | Mod: CPTII,S$GLB,, | Performed by: NURSE PRACTITIONER

## 2024-05-08 PROCEDURE — 99999 PR PBB SHADOW E&M-EST. PATIENT-LVL V: CPT | Mod: PBBFAC,,, | Performed by: NURSE PRACTITIONER

## 2024-05-08 PROCEDURE — 1101F PT FALLS ASSESS-DOCD LE1/YR: CPT | Mod: CPTII,S$GLB,, | Performed by: NURSE PRACTITIONER

## 2024-05-08 PROCEDURE — 1111F DSCHRG MED/CURRENT MED MERGE: CPT | Mod: CPTII,S$GLB,, | Performed by: NURSE PRACTITIONER

## 2024-05-08 PROCEDURE — 3072F LOW RISK FOR RETINOPATHY: CPT | Mod: CPTII,S$GLB,, | Performed by: NURSE PRACTITIONER

## 2024-05-08 PROCEDURE — 99214 OFFICE O/P EST MOD 30 MIN: CPT | Mod: S$GLB,,, | Performed by: NURSE PRACTITIONER

## 2024-05-08 PROCEDURE — 1157F ADVNC CARE PLAN IN RCRD: CPT | Mod: CPTII,S$GLB,, | Performed by: NURSE PRACTITIONER

## 2024-05-08 PROCEDURE — 3008F BODY MASS INDEX DOCD: CPT | Mod: CPTII,S$GLB,, | Performed by: NURSE PRACTITIONER

## 2024-05-08 PROCEDURE — 4010F ACE/ARB THERAPY RXD/TAKEN: CPT | Mod: CPTII,S$GLB,, | Performed by: NURSE PRACTITIONER

## 2024-05-08 PROCEDURE — 3044F HG A1C LEVEL LT 7.0%: CPT | Mod: CPTII,S$GLB,, | Performed by: NURSE PRACTITIONER

## 2024-05-08 PROCEDURE — 1160F RVW MEDS BY RX/DR IN RCRD: CPT | Mod: CPTII,S$GLB,, | Performed by: NURSE PRACTITIONER

## 2024-05-08 RX ORDER — QUETIAPINE FUMARATE 25 MG/1
25 TABLET, FILM COATED ORAL NIGHTLY
Qty: 30 TABLET | Refills: 0 | Status: SHIPPED | OUTPATIENT
Start: 2024-05-08 | End: 2025-05-08

## 2024-05-08 NOTE — PROGRESS NOTES
Subjective:       Patient ID: Nelly Tian is a 73 y.o. female.    Chief Complaint: Follow-up    HPI    dmission Date: 4/21/2024  Hospital Length of Stay: 3 days  Discharge Date and Time:  04/24/2024 3:32 PM    Patient presents today with daughter for hospital follow up. Last visit with PCp- on 2/23/24. She was admitted due to sepsis and abdominal wall cellulitis ID advised IV Daptomycin 800 mg daily for 10 days through May 4th then stop . She presented with complaints of left lower abdominal wound with orange/yellowish drainagewound after scratching her lower abdomen  -100.1, tachypneic, tachycardic, WBC 13.9, lactic acid 1.91, and urinalysis positive WBCs/leukocytes/bacteria culture pending. Chest x-ray significant for trace pulmonary vascular congestion. She was started on Rocephin and Zosyn.   Nasal MRSA came positive, mupirocin started.   Patient has medical history of AFib/a flutter on Eliquis, CHF, COPD, diabetes mellitus, recurrent abdominal cellulitis, hypertension, iron-deficiency anemia, hyperlipidemia, MI, anxiety, and arthritis       Hydroxine disoriented; Daughter  changed Last wound change on yesterday-yellow/green discharge    Admission Date: 2/19/2024  Hospital Length of Stay: 2 days  Discharge Date and Time: 2/21/2024  2:40 PM      community-acquired pneumonia and COPD exacerbation, patient was placed on IV Rocephin azithromycin, DuoNeb q.4 hours and IV steroids   presents with shortness on breath associated with cough and lower abdominal pain for a couple hours . CT Chest large left and small right lung concerning for pneumonia, CT abdomen/pelvis chronic large abdominal wall hernia containing numerous organs without obstruction.           ACP Status:   Patient has had an ACP conversation  Living Will: No  Power of : No  LaPOST: No  Past Medical History:   Diagnosis Date    *Atrial flutter     Angina pectoris 9/18/2017    Anxiety     Arthritis     Asthma     Atrial  fibrillation     Back pain     Cataract     OD    Chest pain 9/17/2017    CHF (congestive heart failure)     Chronically on benzodiazepine therapy 5/4/2019    COPD (chronic obstructive pulmonary disease)     Depression     Diabetes mellitus     Emphysema of lung     Heart failure     Hepatomegaly 2/3/2016    Hernia     History of MI (myocardial infarction) 1/19/2016    Hypercapnic respiratory failure, chronic 11/16/2016    Hyperlipidemia     Hypertension     Iron deficiency anemia 2/3/2016    Myocardial infarction     Obesity     Peripheral vascular disease     Pneumonia     Polyneuropathy     Retinal detachment     OS    Septic shock 4/23/2017    Skin ulcer 3/18/2017    Tobacco dependence     Type II 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, not stated as uncontrolled(250.60)        Review of patient's allergies indicates:   Allergen Reactions    Dilaudid [hydromorphone] Anaphylaxis     Other reaction(s): Anaphylaxis  Other reaction(s): Unknown    Zyvox [linezolid in dextrose 5%] Shortness Of Breath         Current Outpatient Medications:     albuterol (PROVENTIL/VENTOLIN HFA) 90 mcg/actuation inhaler, INHALE 2 PUFFS EVERY 6 HOURS AS NEEDED FOR WHEEZING (RESCUE), Disp: 18 g, Rfl: 5    albuterol-ipratropium (DUO-NEB) 2.5 mg-0.5 mg/3 mL nebulizer solution, Take 3 mLs by nebulization every 6 (six) hours as needed for Wheezing. Rescue, Disp: 75 mL, Rfl: 0    ascorbic acid, vitamin C, (VITAMIN C) 500 MG tablet, Take 2 tablets (1,000 mg total) by mouth every evening., Disp: , Rfl:     atorvastatin (LIPITOR) 10 MG tablet, TAKE 1 TABLET(10 MG) BY MOUTH EVERY OTHER DAY, Disp: 35 tablet, Rfl: 3    blood sugar diagnostic Strp, To check BG 2 times daily, to use with insurance preferred meter, Disp: 100 each, Rfl: 5    budesonide (PULMICORT) 0.25 mg/2 mL nebulizer solution, INHALE THE CONTENTS OF 1 VIAL VIA NEBULIZER ONCE DAILY. CONTROLLER, Disp: 120 mL, Rfl: 5    cefadroxil (DURICEF) 500 MG Cap,  Take 1 capsule (500 mg total) by mouth every 12 (twelve) hours., Disp: 180 capsule, Rfl: 3    ELIQUIS 5 mg Tab, TAKE 1 TABLET TWICE DAILY, Disp: 180 tablet, Rfl: 3    empagliflozin (JARDIANCE) 25 mg tablet, Take 1 tablet (25 mg total) by mouth once daily., Disp: 90 tablet, Rfl: 4    fluconazole (DIFLUCAN) 100 MG tablet, Take 2 tablets (200 mg total) by mouth once daily., Disp: 14 tablet, Rfl: 0    fluticasone propionate (FLONASE) 50 mcg/actuation nasal spray, SHAKE LIQUID AND USE 1 SPRAY IN EACH NOSTRIL EVERY DAY, Disp: 48 g, Rfl: 3    furosemide (LASIX) 40 MG tablet, Take 1 tablet (40 mg total) by mouth once daily., Disp: 90 tablet, Rfl: 9    gabapentin (NEURONTIN) 800 MG tablet, TAKE 1 TABLET(800 MG) BY MOUTH THREE TIMES DAILY, Disp: 90 tablet, Rfl: 11    HYDROcodone-acetaminophen (NORCO) 7.5-325 mg per tablet, Take 1 tablet by mouth every 12 (twelve) hours as needed for Pain. Medical necessary for greater than 7 days for chronic pain., Disp: 60 tablet, Rfl: 0    Lactobacillus acidophilus 1 billion cell Cap, Take 1 capsule by mouth 3 (three) times daily with meals., Disp: 60 capsule, Rfl: 0    lactulose (CHRONULAC) 20 gram/30 mL Soln, Take 15 mLs (10 g total) by mouth once daily., Disp: 450 mL, Rfl: 2    lancets Misc, To check BG 2 times daily, to use with insurance preferred meter, Disp: 100 each, Rfl: 5    LIDOcaine (LIDODERM) 5 %, APPLY PATCH ONTO THE SKIN. REMOVE AND DISCARD PATCH WITHIN 12 HOURS OR AS DIRECTED BY MD, Disp: 30 patch, Rfl: 3    lisinopriL (PRINIVIL,ZESTRIL) 20 MG tablet, Take 1 tablet (20 mg total) by mouth once daily., Disp: 90 tablet, Rfl: 3    melatonin 10 mg Tab, Take 10 mg by mouth nightly., Disp: , Rfl:     metFORMIN (GLUCOPHAGE) 500 MG tablet, TAKE 1 TABLET EVERY DAY WITH BREAKFAST, Disp: 90 tablet, Rfl: 3    multivitamin (THERAGRAN) tablet, Take 1 tablet by mouth once daily., Disp: , Rfl:     nystatin (NYSTOP) powder, APPLY TOPICALLY TO THE AFFECTED AREA TWICE DAILY, Disp: 120 g, Rfl:  11    ondansetron (ZOFRAN-ODT) 8 MG TbDL, Take 1 tablet (8 mg total) by mouth every 12 (twelve) hours as needed., Disp: 40 tablet, Rfl: 3    potassium chloride SA (K-DUR,KLOR-CON) 20 MEQ tablet, Take 1 tablet (20 mEq total) by mouth once daily., Disp: 90 tablet, Rfl: 3    spironolactone (ALDACTONE) 25 MG tablet, Take 1 tablet (25 mg total) by mouth 3 (three) times a week., Disp: 90 tablet, Rfl: 4    tirzepatide (MOUNJARO) 2.5 mg/0.5 mL PnIj, Inject 2.5 mg into the skin every 7 days., Disp: 4 Pen, Rfl: 2    TRELEGY ELLIPTA 100-62.5-25 mcg DsDv, INHALE 1 PUFF EVERY DAY. EXPIRES 42 DAYS AFTER OPENING FOIL TRAY, Disp: 90 each, Rfl: 3    blood-glucose meter kit, To check BG 2 times daily, to use with insurance preferred meter, Disp: 1 each, Rfl: 1    hydrOXYzine HCL (ATARAX) 50 MG tablet, Take 1 tablet (50 mg total) by mouth nightly as needed for Itching. (Patient not taking: Reported on 5/8/2024), Disp: 90 tablet, Rfl: 2    nystatin-triamcinolone (MYCOLOG II) cream, Apply topically 3 (three) times daily. Apply to affected area, Disp: 30 g, Rfl: 1    QUEtiapine (SEROQUEL) 25 MG Tab, Take 1 tablet (25 mg total) by mouth nightly., Disp: 30 tablet, Rfl: 0    Review of Systems   Constitutional:  Negative for unexpected weight change.   HENT:  Negative for trouble swallowing.    Eyes:  Negative for visual disturbance.   Respiratory:  Negative for shortness of breath.    Cardiovascular:  Negative for chest pain, palpitations and leg swelling.   Gastrointestinal:  Negative for blood in stool.   Genitourinary:  Negative for hematuria.   Skin:  Negative for rash.   Allergic/Immunologic: Negative for immunocompromised state.   Neurological:  Negative for headaches.   Hematological:  Does not bruise/bleed easily.   Psychiatric/Behavioral:  Negative for agitation. The patient is not nervous/anxious.        Objective:      /70 (BP Location: Left arm, Patient Position: Sitting, BP Method: Small (Manual))   Pulse 82   Temp 98.1  "°F (36.7 °C) (Oral)   Ht 5' 8" (1.727 m)   Wt 120.6 kg (265 lb 14 oz)   LMP  (LMP Unknown)   SpO2 96%   BMI 40.43 kg/m²   Physical Exam  Constitutional:       Appearance: She is well-developed. She is obese.   HENT:      Head: Normocephalic.   Cardiovascular:      Rate and Rhythm: Normal rate and regular rhythm.      Heart sounds: Normal heart sounds.   Pulmonary:      Effort: Pulmonary effort is normal.   Musculoskeletal:         General: Normal range of motion.      Comments: Ambulating with walker   Skin:     General: Skin is warm and dry.   Neurological:      Mental Status: She is alert and oriented to person, place, and time.   Psychiatric:         Behavior: Behavior normal.         Thought Content: Thought content normal.         Judgment: Judgment normal.         Assessment:       1. Type 2 diabetes mellitus with diabetic peripheral angiopathy without gangrene, without long-term current use of insulin    2. Hypertension associated with diabetes    3. Lumbar degenerative disc disease    4. Disability of walking    5. Acute respiratory failure with hypoxemia    6. Major depression, recurrent, chronic    7. Chronic systolic congestive heart failure    8. Insomnia, unspecified type    9. Chronic atrial fibrillation    10. Obesity, morbid, BMI 40.0-49.9        Plan:       Type 2 diabetes mellitus with diabetic peripheral angiopathy without gangrene, without long-term current use of insulin  -     CBC W/ AUTO DIFFERENTIAL; Future; Expected date: 05/08/2024  -     COMPREHENSIVE METABOLIC PANEL; Future; Expected date: 05/08/2024  -     Lipid Panel; Future; Expected date: 05/08/2024  -     Hemoglobin A1C; Future; Expected date: 05/08/2024  Stable, continue management  Follow the ADA diet  Hemoglobin A1C   Date Value Ref Range Status   02/19/2024 6.0 4.5 - 6.2 % Final     Comment:     According to ADA guidelines, hemoglobin A1C <7.0% represents  optimal control in non-pregnant diabetic patients.  " Different  metrics may apply to specific populations.   Standards of Medical Care in Diabetes - 2016.    For the purpose of screening for the presence of diabetes:  <5.7%     Consistent with the absence of diabetes  5.7-6.4%  Consistent with increasing risk for diabetes   (prediabetes)  >or=6.5%  Consistent with diabetes    Currently no consensus exists for use of hemoglobin A1C  for diagnosis of diabetes for children.     04/27/2023 5.4 4.0 - 5.6 % Final     Comment:     ADA Screening Guidelines:  5.7-6.4%  Consistent with prediabetes  >or=6.5%  Consistent with diabetes    High levels of fetal hemoglobin interfere with the HbA1C  assay. Heterozygous hemoglobin variants (HbS, HgC, etc)do  not significantly interfere with this assay.   However, presence of multiple variants may affect accuracy.     12/05/2022 5.5 4.0 - 5.6 % Final     Comment:     ADA Screening Guidelines:  5.7-6.4%  Consistent with prediabetes  >or=6.5%  Consistent with diabetes    High levels of fetal hemoglobin interfere with the HbA1C  assay. Heterozygous hemoglobin variants (HbS, HgC, etc)do  not significantly interfere with this assay.   However, presence of multiple variants may affect accuracy.        Hypertension associated with diabetes  -     CBC W/ AUTO DIFFERENTIAL; Future; Expected date: 05/08/2024  -     COMPREHENSIVE METABOLIC PANEL; Future; Expected date: 05/08/2024  Stable,continue management]  Low sodium diet  BP Readings from Last 3 Encounters:   05/08/24 130/70   05/06/24 (!) (P) 140/82   04/29/24 136/63      Lumbar degenerative disc disease  -     WALKER FOR HOME USE    Disability of walking  -     WALKER FOR HOME USE    Acute respiratory failure with hypoxemia  Stable, on continuous oxygen  Major depression, recurrent, chronic  Stable, continue management  Chronic systolic congestive heart failure  Stable, continue Cardiology follow up  Insomnia, unspecified type  -     QUEtiapine (SEROQUEL) 25 MG Tab; Take 1 tablet (25 mg  "total) by mouth nightly.  Dispense: 30 tablet; Refill: 0  Patient reports hydroxyzine makes her disoriented   Patient was counseled on improving insomnia with these home care guidelines:  Review your medicines with your doctor or pharmacist to find out if they can cause insomnia.  Caffeine, smoking and alcohol also affect sleep. Limit your daily use and do not use these before bedtime. Alcohol may make you sleepy at first, but as its effects wear off, you may awaken a few hours later and have trouble returning to sleep.  Do not exercise, eat or drink large amounts of liquid within 2 hours of your bedtime.  Improve your sleep habits. Have a fixed bed and wake-up time. Try to keep noise, light and heat in your bedroom at a comfortable level. Try using earplugs or eyeshades if needed.   Avoid watching TV in bed.  If you do not fall asleep within 30 minutes, try to relax by reading or listening to soft music.  Limit daytime napping to one 30 minute period, early in the day.  Get regular exercise. Find other ways to lessen your stress level.  If a medicine was prescribed to help reset your sleep patterns, take it as directed. Sleeping pills are intended for short-term use, only. If taken for too long, the effect wears off while the risk of physical addiction and psychological dependence increases   Chronic atrial fibrillation  Stable, continue management  Obesity, morbid, BMI 40.0-49.9  Counseled patient on his ideal body weight, health consequences of being obese and current recommendations including weekly exercise and a heart healthy diet.  Current BMI is:Estimated body mass index is 40.43 kg/m² as calculated from the following:    Height as of this encounter: 5' 8" (1.727 m).    Weight as of this encounter: 120.6 kg (265 lb 14 oz)..  Patient is aware that ideal BMI < 25 or Weight in (lb) to have BMI = 25: 164.1.           Patient readiness: acceptance and barriers:none    During the course of the visit the patient " was educated and counseled about the following:     Diabetes:  Addressed ADA diet.  Suggested low cholesterol diet.  Encouraged aerobic exercise.  Hypertension:   Dietary sodium restriction.  Regular aerobic exercise.  Obesity:   General weight loss/lifestyle modification strategies discussed (elicit support from others; identify saboteurs; non-food rewards, etc).  Regular aerobic exercise program discussed.    Goals: Diabetes: Maintain Hemoglobin A1C below 7, Hypertension: Reduce Blood Pressure, and Obesity: Reduce calorie intake and BMI    Did patient meet goals/outcomes: Yes    The following self management tools provided: declined    Patient Instructions (the written plan) was given to the patient/family.     Time spent with patient: 30 minutes    Barriers to medications present (no )    Adverse reactions to current medications (no)    Over the counter medications reviewed (Yes)

## 2024-05-08 NOTE — PATIENT INSTRUCTIONS
Michel Villegas,     If you are due for any health screening(s) below please notify me so we can arrange them to be ordered and scheduled to maintain your health. Most healthy patients complete it. Don't lose out on improving your health.     Tests to Keep You Healthy    Mammogram: DUE  Eye Exam: Met on 5/23/2023  Colon Cancer Screening: DUE  Last Blood Pressure <= 139/89 (5/8/2024): NO  Last HbA1c < 8 (02/19/2024): Yes      Breast Cancer Screening    Breast cancer is the second most common cancer in women after skin cancer, and the second leading cause of death from cancer after lung cancer. Mammograms can detect breast cancer early, which significantly increases the chances of curing the cancer.      A screening mammogram is an x-ray image of the breasts used for early breast cancer detection. It can help reduce the number of deaths from breast cancer among women. To get a clear image, the breast is placed between two plastic plates to make it flat. How often a mammogram is needed depends on your age and your breast cancer risk.    Colon Cancer Screening    Of cancers affecting both men and women, colorectal cancer is the third leading cancer killer in the [place]. But it doesnt have to be. Screening can prevent colorectal cancer or find it at an early stage when treatment often leads to a cure.    A colonoscopy is the preferred test for detecting colon cancer. It is needed only once every 10 years if results are negative. While sedated, a flexible, lighted tube with a tiny camera is inserted into the rectum and advanced through the colon to look for cancers. An alternative screening test that is used at home and returned to the lab may also be used. It detects hidden blood in bowel movements which could indicate cancer in the colon. If results are positive, you will need a colonoscopy to determine if the blood is a sign of cancer. This type of follow up (diagnostic) colonoscopy usually requires additional  copays as required by your insurance provider. Please contact your PCP if you have any questions.

## 2024-05-12 ENCOUNTER — PATIENT MESSAGE (OUTPATIENT)
Dept: PRIMARY CARE CLINIC | Facility: CLINIC | Age: 74
End: 2024-05-12
Payer: MEDICARE

## 2024-05-14 ENCOUNTER — TELEPHONE (OUTPATIENT)
Dept: PRIMARY CARE CLINIC | Facility: CLINIC | Age: 74
End: 2024-05-14
Payer: MEDICARE

## 2024-05-14 ENCOUNTER — PATIENT MESSAGE (OUTPATIENT)
Dept: PRIMARY CARE CLINIC | Facility: CLINIC | Age: 74
End: 2024-05-14
Payer: MEDICARE

## 2024-05-14 NOTE — TELEPHONE ENCOUNTER
Kassi Ag [facility] Staff     ----- Message from Kassi Ag sent at 5/14/2024 11:07 AM CDT -----  Contact: Latoya (nurse)  Type:  Needs Medical Advice    Who Called: Latoya    Pharmacy name and phone #:      [id number] [address] & [address] [address]  Phone: [phone] Fax: [phone]      Would the patient rather a call back or a response via [facility]? Call back    Best Call Back Number:  (nurse)    Additional Information: pt was put on QUEtiapine (SEROQUEL) 25 MG Tab and medicare is saying it's not covered    Is there something else that will work?    Or can you send PA for med nec so they will cover it?    Please call to advise  Thanks

## 2024-05-14 NOTE — TELEPHONE ENCOUNTER
Call placed to patient's caregiver (Latoya) who states patient received a letter from insurance stating Seroquel 25 mg was non-preferred and prior authorization is needed.  Writer contacted pharmacy to confirm if prescription requires prior authorization.  Spoke to pharmacy technician (Nolan) who states patient filled and picked up prescription for Seroquel 25 mg on 5-9-24 for a 30 day supply.  This has been fully explained to the Mrs. Klein, who indicates understanding.  Mrs. Klein confirmed patient filled prescription on 5-9-24, and received letter thereafter.  Mrs. Klein states she will send a copy of the letter to office via [facility] for review.

## 2024-05-17 ENCOUNTER — TELEPHONE (OUTPATIENT)
Dept: PRIMARY CARE CLINIC | Facility: CLINIC | Age: 74
End: 2024-05-17
Payer: MEDICARE

## 2024-05-17 NOTE — TELEPHONE ENCOUNTER
Return call placed to [facility], spoke to Sadi who advised the nurse was unable to draw patient's labs today.  The nurse is scheduled to visit with patient on Monday, and will attempt to draw labs at that time.  Will send follow up message to Patricia Ramon for notification.

## 2024-05-17 NOTE — TELEPHONE ENCOUNTER
Greta Evans, Patient Care Assistant  [facility] Staff     ----- Message from Greta Evans Patient Care Assistant sent at 5/17/2024  2:10 PM CDT -----  Regarding: orders  Contact: Deuce with [facility]  Type: Needs Medical Advice    Who Called:  Deuce with [facility]    Best Call Back Number:     Additional Information: Parkerlyndsay with [facility] states she would like a callback regarding unable to draw the pt's lab. Please call to advise. Thanks!

## 2024-05-20 ENCOUNTER — LAB VISIT (OUTPATIENT)
Dept: LAB | Facility: HOSPITAL | Age: 74
End: 2024-05-20
Attending: NURSE PRACTITIONER
Payer: MEDICARE

## 2024-05-20 DIAGNOSIS — E11.69 OBESITY, DIABETES, AND HYPERTENSION SYNDROME: ICD-10-CM

## 2024-05-20 DIAGNOSIS — I15.2 OBESITY, DIABETES, AND HYPERTENSION SYNDROME: ICD-10-CM

## 2024-05-20 DIAGNOSIS — I15.2 ENDOCRINE HYPERTENSION: Primary | ICD-10-CM

## 2024-05-20 DIAGNOSIS — E66.9 OBESITY, DIABETES, AND HYPERTENSION SYNDROME: ICD-10-CM

## 2024-05-20 DIAGNOSIS — E11.59 OBESITY, DIABETES, AND HYPERTENSION SYNDROME: ICD-10-CM

## 2024-05-20 DIAGNOSIS — E11.51 TYPE II DIABETES MELLITUS WITH PERIPHERAL CIRCULATORY DISORDER: ICD-10-CM

## 2024-05-20 PROBLEM — J96.22 ACUTE ON CHRONIC RESPIRATORY FAILURE WITH HYPOXIA AND HYPERCAPNIA: Status: RESOLVED | Noted: 2024-02-19 | Resolved: 2024-05-20

## 2024-05-20 PROBLEM — J96.21 ACUTE ON CHRONIC RESPIRATORY FAILURE WITH HYPOXIA AND HYPERCAPNIA: Status: RESOLVED | Noted: 2024-02-19 | Resolved: 2024-05-20

## 2024-05-20 LAB
ALBUMIN SERPL BCP-MCNC: 3.4 G/DL (ref 3.5–5.2)
ALP SERPL-CCNC: 71 U/L (ref 55–135)
ALT SERPL W/O P-5'-P-CCNC: 16 U/L (ref 10–44)
ANION GAP SERPL CALC-SCNC: 7 MMOL/L (ref 8–16)
AST SERPL-CCNC: 20 U/L (ref 10–40)
BILIRUB SERPL-MCNC: 0.7 MG/DL (ref 0.1–1)
BUN SERPL-MCNC: 10 MG/DL (ref 8–23)
CALCIUM SERPL-MCNC: 9.9 MG/DL (ref 8.7–10.5)
CHLORIDE SERPL-SCNC: 98 MMOL/L (ref 95–110)
CO2 SERPL-SCNC: 26 MMOL/L (ref 23–29)
CREAT SERPL-MCNC: 0.6 MG/DL (ref 0.5–1.4)
ERYTHROCYTE [DISTWIDTH] IN BLOOD BY AUTOMATED COUNT: 13.7 % (ref 11.5–14.5)
EST. GFR  (NO RACE VARIABLE): >60 ML/MIN/1.73 M^2
ESTIMATED AVG GLUCOSE: 108 MG/DL (ref 68–131)
GLUCOSE SERPL-MCNC: 87 MG/DL (ref 70–110)
HBA1C MFR BLD: 5.4 % (ref 4.5–6.2)
HCT VFR BLD AUTO: 43.9 % (ref 37–48.5)
HGB BLD-MCNC: 14.1 G/DL (ref 12–16)
MCH RBC QN AUTO: 30.7 PG (ref 27–31)
MCHC RBC AUTO-ENTMCNC: 32.1 G/DL (ref 32–36)
MCV RBC AUTO: 96 FL (ref 82–98)
PLATELET # BLD AUTO: 280 K/UL (ref 150–450)
PMV BLD AUTO: 9.7 FL (ref 9.2–12.9)
POTASSIUM SERPL-SCNC: 4.7 MMOL/L (ref 3.5–5.1)
PROT SERPL-MCNC: 7.4 G/DL (ref 6–8.4)
RBC # BLD AUTO: 4.59 M/UL (ref 4–5.4)
SODIUM SERPL-SCNC: 131 MMOL/L (ref 136–145)
WBC # BLD AUTO: 10.37 K/UL (ref 3.9–12.7)

## 2024-05-20 PROCEDURE — 85027 COMPLETE CBC AUTOMATED: CPT | Performed by: NURSE PRACTITIONER

## 2024-05-20 PROCEDURE — 80053 COMPREHEN METABOLIC PANEL: CPT | Performed by: NURSE PRACTITIONER

## 2024-05-20 PROCEDURE — 36415 COLL VENOUS BLD VENIPUNCTURE: CPT | Performed by: NURSE PRACTITIONER

## 2024-05-20 PROCEDURE — 83036 HEMOGLOBIN GLYCOSYLATED A1C: CPT | Performed by: NURSE PRACTITIONER

## 2024-05-21 RX ORDER — NYSTATIN AND TRIAMCINOLONE ACETONIDE 100000; 1 [USP'U]/G; MG/G
CREAM TOPICAL 3 TIMES DAILY
Qty: 30 G | Refills: 5 | Status: SHIPPED | OUTPATIENT
Start: 2024-05-21

## 2024-05-21 NOTE — TELEPHONE ENCOUNTER
Refill Routing Note   Medication(s) are not appropriate for processing by [facility] for the following reason(s):        Outside of protocol    [facility] action(s):  Route             Appointments  past 12m or future 3m with PCP    Date Provider   Last Visit   2/23/2024 Constantine Bird MD   Next Visit   Visit date not found Constantine Bird MD   ED visits in past 90 days: 1        Note composed:7:49 AM 05/21/2024

## 2024-05-22 ENCOUNTER — TELEPHONE (OUTPATIENT)
Dept: PRIMARY CARE CLINIC | Facility: CLINIC | Age: 74
End: 2024-05-22
Payer: MEDICARE

## 2024-05-22 DIAGNOSIS — E87.1 LOW SODIUM LEVELS: Primary | ICD-10-CM

## 2024-05-23 ENCOUNTER — TELEPHONE (OUTPATIENT)
Dept: PRIMARY CARE CLINIC | Facility: CLINIC | Age: 74
End: 2024-05-23
Payer: MEDICARE

## 2024-05-23 DIAGNOSIS — J96.11 CHRONIC RESPIRATORY FAILURE WITH HYPOXIA: Primary | ICD-10-CM

## 2024-05-23 DIAGNOSIS — J44.9 CHRONIC OBSTRUCTIVE PULMONARY DISEASE, UNSPECIFIED COPD TYPE: ICD-10-CM

## 2024-05-23 NOTE — TELEPHONE ENCOUNTER
Patient called nurse line. Patient states she needs a new order for oxygen faxed to [facility] she states that they need to know she is still in need of oxygen

## 2024-05-27 ENCOUNTER — LAB VISIT (OUTPATIENT)
Dept: LAB | Facility: HOSPITAL | Age: 74
End: 2024-05-27
Attending: NURSE PRACTITIONER
Payer: MEDICARE

## 2024-05-27 DIAGNOSIS — E87.1 HYPOSMOLALITY SYNDROME: Primary | ICD-10-CM

## 2024-05-27 LAB
ALBUMIN SERPL BCP-MCNC: 3.3 G/DL (ref 3.5–5.2)
ALP SERPL-CCNC: 70 U/L (ref 55–135)
ALT SERPL W/O P-5'-P-CCNC: 14 U/L (ref 10–44)
ANION GAP SERPL CALC-SCNC: 12 MMOL/L (ref 8–16)
AST SERPL-CCNC: 16 U/L (ref 10–40)
BILIRUB SERPL-MCNC: 0.6 MG/DL (ref 0.1–1)
BUN SERPL-MCNC: 8 MG/DL (ref 8–23)
CALCIUM SERPL-MCNC: 10.7 MG/DL (ref 8.7–10.5)
CHLORIDE SERPL-SCNC: 91 MMOL/L (ref 95–110)
CO2 SERPL-SCNC: 29 MMOL/L (ref 23–29)
CREAT SERPL-MCNC: 0.6 MG/DL (ref 0.5–1.4)
EST. GFR  (NO RACE VARIABLE): >60 ML/MIN/1.73 M^2
GLUCOSE SERPL-MCNC: 78 MG/DL (ref 70–110)
POTASSIUM SERPL-SCNC: 4.4 MMOL/L (ref 3.5–5.1)
PROT SERPL-MCNC: 7.8 G/DL (ref 6–8.4)
SODIUM SERPL-SCNC: 132 MMOL/L (ref 136–145)

## 2024-05-27 PROCEDURE — 80053 COMPREHEN METABOLIC PANEL: CPT | Performed by: NURSE PRACTITIONER

## 2024-05-27 PROCEDURE — 36415 COLL VENOUS BLD VENIPUNCTURE: CPT | Performed by: NURSE PRACTITIONER

## 2024-05-30 ENCOUNTER — TELEPHONE (OUTPATIENT)
Dept: FAMILY MEDICINE | Facility: CLINIC | Age: 74
End: 2024-05-30
Payer: MEDICARE

## 2024-05-30 NOTE — TELEPHONE ENCOUNTER
Called patient about labs and she asked about the Seroquel. Can you see if she needs/needed a P.A.

## 2024-05-30 NOTE — TELEPHONE ENCOUNTER
----- Message from Shaista King sent at 10/9/2017 11:29 AM CDT -----  Contact: call //[phone]    Calling  For  A  Script   Refill  For  Lasix  /nystacin and triamcinolone  cream// please call for detail  [facility] [address] [address] - [address]  [address] [address]  Phone: [phone] Fax: [phone]       See Physician's progress note.

## 2024-06-02 DIAGNOSIS — G47.00 INSOMNIA, UNSPECIFIED TYPE: ICD-10-CM

## 2024-06-02 DIAGNOSIS — M51.36 LUMBAR DEGENERATIVE DISC DISEASE: ICD-10-CM

## 2024-06-02 DIAGNOSIS — Z51.81 THERAPEUTIC DRUG MONITORING: ICD-10-CM

## 2024-06-02 RX ORDER — QUETIAPINE FUMARATE 25 MG/1
25 TABLET, FILM COATED ORAL NIGHTLY
Qty: 30 TABLET | Refills: 0 | Status: CANCELLED | OUTPATIENT
Start: 2024-06-02 | End: 2025-06-02

## 2024-06-03 ENCOUNTER — PATIENT MESSAGE (OUTPATIENT)
Dept: PRIMARY CARE CLINIC | Facility: CLINIC | Age: 74
End: 2024-06-03
Payer: MEDICARE

## 2024-06-03 RX ORDER — HYDROCODONE BITARTRATE AND ACETAMINOPHEN 7.5; 325 MG/1; MG/1
1 TABLET ORAL EVERY 12 HOURS PRN
Qty: 60 TABLET | Refills: 0 | Status: SHIPPED | OUTPATIENT
Start: 2024-06-03 | End: 2024-07-03

## 2024-06-03 NOTE — TELEPHONE ENCOUNTER
Refill Routing Note   Medication(s) are not appropriate for processing by [facility] for the following reason(s):        Outside of protocol    [facility] action(s):  Route               Appointments  past 12m or future 3m with PCP    Date Provider   Last Visit   2/23/2024 Constantine Bird MD   Next Visit   Visit date not found Constantine Bird MD   ED visits in past 90 days: 1        Note composed:8:44 AM 06/03/2024

## 2024-06-07 ENCOUNTER — DOCUMENT SCAN (OUTPATIENT)
Dept: HOME HEALTH SERVICES | Facility: HOSPITAL | Age: 74
End: 2024-06-07
Payer: MEDICARE

## 2024-06-12 ENCOUNTER — PATIENT MESSAGE (OUTPATIENT)
Dept: PRIMARY CARE CLINIC | Facility: CLINIC | Age: 74
End: 2024-06-12
Payer: MEDICARE

## 2024-06-17 ENCOUNTER — TELEPHONE (OUTPATIENT)
Dept: PRIMARY CARE CLINIC | Facility: CLINIC | Age: 74
End: 2024-06-17
Payer: MEDICARE

## 2024-06-17 NOTE — TELEPHONE ENCOUNTER
----- Message from Tamy Vizcarra sent at 6/17/2024 11:00 AM CDT -----  Regarding: refill  Contact: patient  Type:  RX Refill Request    Who Called:  patient   Refill or New Rx:  refill  RX Name and Strength:  hydr hcl 50mg for itching   How is the patient currently taking it? (ex. 1XDay):    Is this a 30 day or 90 day RX:    Preferred Pharmacy with phone number:       [facility] #[id number] - [address] & [address] [address]  Phone: [phone] Fax: [phone]      Local or Mail Order:    Ordering Provider:    Best Call Back Number:  [phone]    Additional Information:  call once sent thanks.

## 2024-06-24 PROCEDURE — G0179 MD RECERTIFICATION HHA PT: HCPCS | Mod: ,,, | Performed by: FAMILY MEDICINE

## 2024-06-26 ENCOUNTER — PATIENT MESSAGE (OUTPATIENT)
Dept: ENDOCRINOLOGY | Facility: CLINIC | Age: 74
End: 2024-06-26
Payer: MEDICARE

## 2024-06-29 DIAGNOSIS — E11.43 GASTROPARESIS DIABETICORUM: ICD-10-CM

## 2024-06-29 DIAGNOSIS — K31.84 GASTROPARESIS DIABETICORUM: ICD-10-CM

## 2024-06-29 DIAGNOSIS — R11.11 NON-INTRACTABLE VOMITING WITHOUT NAUSEA: ICD-10-CM

## 2024-06-29 NOTE — TELEPHONE ENCOUNTER
Care Due:                  Date            Visit Type   Department     Provider  --------------------------------------------------------------------------------                                HOSPITAL  Last Visit: 02-      FOLLOW UP    None Found     Constantine Bird  Next Visit: None Scheduled  None         None Found                                                            Last  Test          Frequency    Reason                     Performed    Due Date  --------------------------------------------------------------------------------    Lipid Panel.  12 months..  atorvastatin.............  03- 03-    [facility] Embedded Care Due Messages. Reference number: [id number].   6/29/2024 12:52:53 PM CDT   Cortney Chávez  (RN)  2024 12:58:47

## 2024-07-01 RX ORDER — ONDANSETRON 8 MG/1
TABLET, ORALLY DISINTEGRATING ORAL
Qty: 40 TABLET | Refills: 3 | Status: SHIPPED | OUTPATIENT
Start: 2024-07-01

## 2024-07-02 RX ORDER — POTASSIUM CHLORIDE 20 MEQ/1
20 TABLET, EXTENDED RELEASE ORAL DAILY
Qty: 90 TABLET | Refills: 2 | Status: SHIPPED | OUTPATIENT
Start: 2024-07-02

## 2024-07-02 NOTE — TELEPHONE ENCOUNTER
Refill Decision Note   Nelly Tian  is requesting a refill authorization.  Brief Assessment and Rationale for Refill:  Approve     Medication Therapy Plan:  ED visit 4/21/24 for abdominal wall cellulitis & sepsis and 4/29/24 for issues with vascular access. Patient has followed up with ID & no changes to therapy.      Pharmacist review requested: Yes   Extended chart review required: Yes   Comments:     Note composed:5:19 PM 07/02/2024

## 2024-07-02 NOTE — TELEPHONE ENCOUNTER
This request has been routed to the [facility] Review Pool for Pharmacist Review.

## 2024-07-02 NOTE — TELEPHONE ENCOUNTER
----- Message from Dahlia Sebastian sent at 7/2/2024 12:54 PM CDT -----  Regarding: Needs return call  Type: Needs Medical Advice  Who Called:  Pt    Best Call Back Number: [phone]    Additional Information: Pt states she needs to have the office to send in a refill for the potassium for her.       [facility] [address] [place] [address] & [address] [address]  Phone: [phone] Fax: [phone]

## 2024-07-02 NOTE — TELEPHONE ENCOUNTER
No care due was identified.  [facility] Embedded Care Due Messages. Reference number: [id number].   7/02/2024 11:42:31 AM CDT

## 2024-07-07 DIAGNOSIS — I50.22 CHRONIC SYSTOLIC CONGESTIVE HEART FAILURE: ICD-10-CM

## 2024-07-07 DIAGNOSIS — I50.30 DIASTOLIC CHF WITH PRESERVED LEFT VENTRICULAR FUNCTION, NYHA CLASS 2: ICD-10-CM

## 2024-07-07 DIAGNOSIS — E11.51 TYPE 2 DIABETES MELLITUS WITH DIABETIC PERIPHERAL ANGIOPATHY WITHOUT GANGRENE, WITHOUT LONG-TERM CURRENT USE OF INSULIN: ICD-10-CM

## 2024-07-07 NOTE — TELEPHONE ENCOUNTER
No care due was identified.  [facility] Embedded Care Due Messages. Reference number: [id number].   7/07/2024 9:28:27 AM CDT

## 2024-07-08 DIAGNOSIS — G47.00 INSOMNIA, UNSPECIFIED TYPE: ICD-10-CM

## 2024-07-08 NOTE — TELEPHONE ENCOUNTER
\  Refill Routing Note   Medication(s) are not appropriate for processing by [facility] for the following reason(s):        New or recently adjusted medication  ED/Hospital Visit since last OV with provider  Drug-disease interaction  Outside of protocol   empagliflozin and PVD (peripheral vascular disease); Hypertension associated with diabetes; Type 2 diabetes mellitus with diabetic peripheral angiopathy without gangrene, without long-term current use of insulin     [facility] action(s):  Defer  Route      Medication Therapy Plan: ED documentation reviewed. No changes to therapy note (JARDIANCE); HOSPITAL F/U COMPLETED ON 05/08/2024 BY ISAEL DOBBINS; RECENT DOSE CHANGED TO SPIRNOLACTONE FROM ONCE DAILY TO 3 TIMES WEEKLY SENT AS NO PRINT; WILL REPEND WITH RECENT SIG AND PEND AS NORMAL    Pharmacist review requested: Yes   Extended chart review required: Yes     Appointments  past 12m or future 3m with PCP    Date Provider   Last Visit   2/23/2024 Constantine Bird MD   Next Visit   Visit date not found Constantine Bird MD   ED visits in past 90 days: 1        Note composed:3:08 PM 07/08/2024

## 2024-07-08 NOTE — TELEPHONE ENCOUNTER
Refill Routing Note   Medication(s) are not appropriate for processing by [facility] for the following reason(s):        Outside of protocol  Non-participating provider    [facility] action(s):  Route               Appointments  past 12m or future 3m with PCP    Date Provider   Last Visit   5/8/2024 Jonh Navarro, NP   Next Visit   7/22/2024 Jonh Navarro, NP   ED visits in past 90 days: 1        Note composed:10:34 AM 07/08/2024           abdomen

## 2024-07-09 RX ORDER — QUETIAPINE FUMARATE 25 MG/1
25 TABLET, FILM COATED ORAL NIGHTLY
Qty: 30 TABLET | Refills: 0 | Status: SHIPPED | OUTPATIENT
Start: 2024-07-09 | End: 2025-07-09

## 2024-07-10 RX ORDER — HYDROXYZINE HYDROCHLORIDE 25 MG/1
25 TABLET, FILM COATED ORAL NIGHTLY PRN
Qty: 30 TABLET | Refills: 3 | Status: SHIPPED | OUTPATIENT
Start: 2024-07-10

## 2024-07-10 RX ORDER — SPIRONOLACTONE 25 MG/1
25 TABLET ORAL
Qty: 36 TABLET | Refills: 3 | Status: SHIPPED | OUTPATIENT
Start: 2024-07-10

## 2024-07-11 NOTE — TELEPHONE ENCOUNTER
Refill approved. Thank you.   [

## 2024-07-12 DIAGNOSIS — E11.51 TYPE 2 DIABETES MELLITUS WITH DIABETIC PERIPHERAL ANGIOPATHY WITHOUT GANGRENE, WITHOUT LONG-TERM CURRENT USE OF INSULIN: ICD-10-CM

## 2024-07-12 RX ORDER — ALBUTEROL SULFATE 90 UG/1
AEROSOL, METERED RESPIRATORY (INHALATION)
OUTPATIENT
Start: 2024-07-12

## 2024-07-12 RX ORDER — TIRZEPATIDE 2.5 MG/.5ML
INJECTION, SOLUTION SUBCUTANEOUS
Refills: 3 | OUTPATIENT
Start: 2024-07-12

## 2024-07-12 RX ORDER — INSULIN PUMP SYRINGE, 3 ML
EACH MISCELLANEOUS
Qty: 1 EACH | Refills: 1 | Status: SHIPPED | OUTPATIENT
Start: 2024-07-12 | End: 2025-07-12

## 2024-07-12 RX ORDER — LANCETS
EACH MISCELLANEOUS
Qty: 100 EACH | Refills: 5 | Status: SHIPPED | OUTPATIENT
Start: 2024-07-12

## 2024-07-12 RX ORDER — TIRZEPATIDE 2.5 MG/.5ML
2.5 INJECTION, SOLUTION SUBCUTANEOUS
Qty: 4 PEN | Refills: 3 | Status: SHIPPED | OUTPATIENT
Start: 2024-07-12 | End: 2024-11-01

## 2024-07-12 NOTE — TELEPHONE ENCOUNTER
No care due was identified.  Health [facility] Embedded Care Due Messages. Reference number: [id number].   7/12/2024 10:32:58 AM CDT

## 2024-07-12 NOTE — TELEPHONE ENCOUNTER
No care due was identified.  Health [facility] Embedded Care Due Messages. Reference number: [id number].   7/12/2024 8:22:29 AM CDT

## 2024-07-12 NOTE — TELEPHONE ENCOUNTER
No care due was identified.  Health [facility] Embedded Care Due Messages. Reference number: [id number].   7/12/2024 9:28:39 AM CDT

## 2024-07-16 DIAGNOSIS — Z51.81 THERAPEUTIC DRUG MONITORING: ICD-10-CM

## 2024-07-17 RX ORDER — LACTULOSE 10 G/15ML
SOLUTION ORAL; RECTAL
Qty: 1350 ML | Refills: 3 | Status: SHIPPED | OUTPATIENT
Start: 2024-07-17

## 2024-07-17 NOTE — TELEPHONE ENCOUNTER
Refill Routing Note   Medication(s) are not appropriate for processing by [facility] for the following reason(s):        Outside of protocol    [facility] action(s):  Route             Appointments  past 12m or future 3m with PCP    Date Provider   Last Visit   2/23/2024 Constantine Bird MD   Next Visit   Visit date not found Constantine Bird MD   ED visits in past 90 days: 1        Note composed:7:35 AM 07/17/2024

## 2024-07-21 NOTE — PROGRESS NOTES
"For vital signs and complete assessments, please see documentation flowsheets.     Pertinent assessments: A&Ox4. Elevated BP, other VSS on RA. Endorsing pain, PRN Tylenol administered with relieve. Baig in place  draining red urine, Urology paged, rec. CBI. No clots noted. Tolerating regular diet, denies N/v/D/c. 2PIV SL    Major Shift Events: Cysto completed, CBI stopped, output more bloody, Urology paged and recommending continued CBI    Treatment Plan: Rocephin, CBI, baig, CBI  Problem: Adult Inpatient Plan of Care  Goal: Plan of Care Review  Description: The Plan of Care Review/Shift note should be completed every shift.  The Outcome Evaluation is a brief statement about your assessment that the patient is improving, declining, or no change.  This information will be displayed automatically on your shift  note.  Outcome: Progressing  Flowsheets (Taken 7/21/2024 1558)  Outcome Evaluation: blood  noted in the baig, urology paged and recommending CBI, c/o pain PRN tylenol administered  Plan of Care Reviewed With: patient  Overall Patient Progress: no change  Goal: Patient-Specific Goal (Individualized)  Description: You can add care plan individualizations to a care plan. Examples of Individualization might be:  \"Parent requests to be called daily at 9am for status\", \"I have a hard time hearing out of my right ear\", or \"Do not touch me to wake me up as it startles  me\".  Outcome: Progressing  Goal: Absence of Hospital-Acquired Illness or Injury  Outcome: Progressing  Intervention: Identify and Manage Fall Risk  Recent Flowsheet Documentation  Taken 7/21/2024 1100 by Angélica Artis, RN  Safety Promotion/Fall Prevention: safety round/check completed  Intervention: Prevent Skin Injury  Recent Flowsheet Documentation  Taken 7/21/2024 1100 by Angélica Artis, RN  Body Position: position changed independently  Intervention: Prevent and Manage VTE (Venous Thromboembolism) Risk  Recent Flowsheet " HPI    1 day s/p phaco IOL OD done on 8/3/2023    States OD is doing well     Moxi QID OD  PF QID OD  Keto QID OD      Last edited by Brittany Jones on 8/3/2023  9:24 AM.            Assessment /Plan     For exam results, see Encounter Report.    Status post cataract surgery, right      Doing well  Post op precautions and instructions reviewed, sheet given  moxi QID  PF QID  Keto qdaily    F/u 1 week                      Documentation  Taken 7/21/2024 1100 by Angélica Artis RN  VTE Prevention/Management: SCDs on (sequential compression devices)  Intervention: Prevent Infection  Recent Flowsheet Documentation  Taken 7/21/2024 1100 by Angélica Artis RN  Infection Prevention: rest/sleep promoted  Goal: Optimal Comfort and Wellbeing  Outcome: Progressing  Intervention: Monitor Pain and Promote Comfort  Recent Flowsheet Documentation  Taken 7/21/2024 1016 by Angélica Artis RN  Pain Management Interventions: medication (see MAR)  Goal: Readiness for Transition of Care  Outcome: Progressing   Goal Outcome Evaluation:      Plan of Care Reviewed With: patient    Overall Patient Progress: no changeOverall Patient Progress: no change    Outcome Evaluation: blood  noted in the baig, urology paged and recommending CBI, c/o pain PRN tylenol administered

## 2024-07-22 PROBLEM — R65.20 SEVERE SEPSIS: Status: RESOLVED | Noted: 2019-01-20 | Resolved: 2024-07-22

## 2024-07-22 PROBLEM — A41.9 SEVERE SEPSIS: Status: RESOLVED | Noted: 2019-01-20 | Resolved: 2024-07-22

## 2024-07-25 ENCOUNTER — EXTERNAL HOME HEALTH (OUTPATIENT)
Dept: HOME HEALTH SERVICES | Facility: HOSPITAL | Age: 74
End: 2024-07-25
Payer: MEDICARE

## 2024-07-28 DIAGNOSIS — E11.59 HYPERTENSION ASSOCIATED WITH DIABETES: ICD-10-CM

## 2024-07-28 DIAGNOSIS — I15.2 HYPERTENSION ASSOCIATED WITH DIABETES: ICD-10-CM

## 2024-07-29 NOTE — TELEPHONE ENCOUNTER
No care due was identified.  [facility] Embedded Care Due Messages. Reference number: [id number].   7/29/2024 3:26:03 PM CDT

## 2024-07-29 NOTE — TELEPHONE ENCOUNTER
Refill Routing Note   Medication(s) are not appropriate for processing by [facility] for the following reason(s):        Required labs outdated    [facility] action(s):  Defer             Appointments  past 12m or future 3m with PCP    Date Provider   Last Visit   2/23/2024 Constantine Bird MD   Next Visit   8/1/2024 Constantine Bird MD   ED visits in past 90 days: 0        Note composed:3:31 PM 07/29/2024

## 2024-07-31 DIAGNOSIS — M51.36 LUMBAR DEGENERATIVE DISC DISEASE: ICD-10-CM

## 2024-07-31 DIAGNOSIS — I50.30 DIASTOLIC CHF WITH PRESERVED LEFT VENTRICULAR FUNCTION, NYHA CLASS 2: ICD-10-CM

## 2024-07-31 DIAGNOSIS — E11.59 HYPERTENSION ASSOCIATED WITH DIABETES: ICD-10-CM

## 2024-07-31 DIAGNOSIS — E11.9 TYPE 2 DIABETES MELLITUS WITHOUT COMPLICATION: ICD-10-CM

## 2024-07-31 DIAGNOSIS — B37.2 YEAST DERMATITIS: ICD-10-CM

## 2024-07-31 DIAGNOSIS — I50.22 CHRONIC SYSTOLIC CONGESTIVE HEART FAILURE: ICD-10-CM

## 2024-07-31 DIAGNOSIS — I15.2 HYPERTENSION ASSOCIATED WITH DIABETES: ICD-10-CM

## 2024-07-31 RX ORDER — FUROSEMIDE 40 MG/1
40 TABLET ORAL DAILY
Qty: 90 TABLET | Refills: 1 | Status: SHIPPED | OUTPATIENT
Start: 2024-07-31

## 2024-07-31 RX ORDER — ALBUTEROL SULFATE 90 UG/1
2 AEROSOL, METERED RESPIRATORY (INHALATION) EVERY 6 HOURS PRN
Qty: 20.1 G | Refills: 1 | Status: SHIPPED | OUTPATIENT
Start: 2024-07-31

## 2024-07-31 RX ORDER — LISINOPRIL 20 MG/1
20 TABLET ORAL DAILY
Qty: 90 TABLET | Refills: 1 | Status: SHIPPED | OUTPATIENT
Start: 2024-07-31

## 2024-07-31 RX ORDER — LIDOCAINE 50 MG/G
PATCH TOPICAL
Qty: 30 PATCH | Refills: 3 | Status: SHIPPED | OUTPATIENT
Start: 2024-07-31

## 2024-07-31 RX ORDER — ATORVASTATIN CALCIUM 10 MG/1
10 TABLET, FILM COATED ORAL EVERY OTHER DAY
Qty: 45 TABLET | Refills: 0 | Status: SHIPPED | OUTPATIENT
Start: 2024-07-31

## 2024-07-31 RX ORDER — HYDROXYZINE HYDROCHLORIDE 25 MG/1
25 TABLET, FILM COATED ORAL NIGHTLY PRN
Qty: 30 TABLET | Refills: 3 | Status: SHIPPED | OUTPATIENT
Start: 2024-07-31

## 2024-07-31 RX ORDER — ATORVASTATIN CALCIUM 10 MG/1
TABLET, FILM COATED ORAL
Qty: 45 TABLET | Refills: 3 | OUTPATIENT
Start: 2024-07-31

## 2024-07-31 RX ORDER — FLUCONAZOLE 100 MG/1
200 TABLET ORAL DAILY
Qty: 30 TABLET | Refills: 0 | Status: SHIPPED | OUTPATIENT
Start: 2024-07-31 | End: 2024-08-15

## 2024-07-31 NOTE — TELEPHONE ENCOUNTER
Refill request routed to [facility] Review Pool for Pharmacist Review. Duplicate Therapy: furosemide, spironolactone

## 2024-07-31 NOTE — TELEPHONE ENCOUNTER
No care due was identified.  [facility] Embedded Care Due Messages. Reference number: [id number].   7/31/2024 1:32:11 PM CDT

## 2024-07-31 NOTE — TELEPHONE ENCOUNTER
Refill Routing Note   Medication(s) are not appropriate for processing by [facility] for the following reason(s):        Required labs outdated: Lipitor - lipid panel  Outside of protocol: Lidoderm & Atarax    [facility] action(s):  Defer  Route  Approve      Patient's second request for Lipitor.       Extended chart review required: Yes     Appointments  past 12m or future 3m with PCP    Date Provider   Last Visit   2/23/2024 Constantine Bird MD   Next Visit   8/1/2024 Constantine Bird MD   ED visits in past 90 days: 0        Note composed:2:32 PM 07/31/2024

## 2024-07-31 NOTE — TELEPHONE ENCOUNTER
Refill Decision Note   Nelly Tian  is requesting a refill authorization.  Brief Assessment and Rationale for Refill:  Quick Discontinue     Medication Therapy Plan:  Script pending provider's review in separate request       Comments:     Note composed:2:33 PM 07/31/2024

## 2024-08-01 ENCOUNTER — OFFICE VISIT (OUTPATIENT)
Dept: PRIMARY CARE CLINIC | Facility: CLINIC | Age: 74
End: 2024-08-01
Payer: MEDICARE

## 2024-08-01 DIAGNOSIS — I50.42 CHRONIC COMBINED SYSTOLIC AND DIASTOLIC CHF (CONGESTIVE HEART FAILURE): ICD-10-CM

## 2024-08-01 DIAGNOSIS — G93.41 ENCEPHALOPATHY, METABOLIC: ICD-10-CM

## 2024-08-01 DIAGNOSIS — I15.2 HYPERTENSION ASSOCIATED WITH DIABETES: ICD-10-CM

## 2024-08-01 DIAGNOSIS — M51.36 DDD (DEGENERATIVE DISC DISEASE), LUMBAR: Chronic | ICD-10-CM

## 2024-08-01 DIAGNOSIS — E11.59 HYPERTENSION ASSOCIATED WITH DIABETES: ICD-10-CM

## 2024-08-01 DIAGNOSIS — E11.51 TYPE 2 DIABETES MELLITUS WITH DIABETIC PERIPHERAL ANGIOPATHY WITHOUT GANGRENE, WITHOUT LONG-TERM CURRENT USE OF INSULIN: Primary | ICD-10-CM

## 2024-08-01 DIAGNOSIS — L03.311 CELLULITIS OF LEFT ABDOMINAL WALL: ICD-10-CM

## 2024-08-01 DIAGNOSIS — D50.9 IRON DEFICIENCY ANEMIA, UNSPECIFIED IRON DEFICIENCY ANEMIA TYPE: ICD-10-CM

## 2024-08-01 DIAGNOSIS — R79.9 ABNORMAL FINDING OF BLOOD CHEMISTRY, UNSPECIFIED: ICD-10-CM

## 2024-08-01 PROCEDURE — 1157F ADVNC CARE PLAN IN RCRD: CPT | Mod: CPTII,95,, | Performed by: FAMILY MEDICINE

## 2024-08-01 PROCEDURE — 4010F ACE/ARB THERAPY RXD/TAKEN: CPT | Mod: CPTII,95,, | Performed by: FAMILY MEDICINE

## 2024-08-01 PROCEDURE — 99214 OFFICE O/P EST MOD 30 MIN: CPT | Mod: 95,,, | Performed by: FAMILY MEDICINE

## 2024-08-01 PROCEDURE — 3072F LOW RISK FOR RETINOPATHY: CPT | Mod: CPTII,95,, | Performed by: FAMILY MEDICINE

## 2024-08-01 PROCEDURE — 1160F RVW MEDS BY RX/DR IN RCRD: CPT | Mod: CPTII,95,, | Performed by: FAMILY MEDICINE

## 2024-08-01 PROCEDURE — 1159F MED LIST DOCD IN RCRD: CPT | Mod: CPTII,95,, | Performed by: FAMILY MEDICINE

## 2024-08-01 PROCEDURE — 3044F HG A1C LEVEL LT 7.0%: CPT | Mod: CPTII,95,, | Performed by: FAMILY MEDICINE

## 2024-08-01 RX ORDER — HYDROCODONE BITARTRATE AND ACETAMINOPHEN 7.5; 325 MG/1; MG/1
1 TABLET ORAL EVERY 8 HOURS PRN
Qty: 60 TABLET | Refills: 0 | Status: SHIPPED | OUTPATIENT
Start: 2024-08-31 | End: 2024-09-30

## 2024-08-01 RX ORDER — HYDROCODONE BITARTRATE AND ACETAMINOPHEN 7.5; 325 MG/1; MG/1
TABLET ORAL
Qty: 100 TABLET | Refills: 0 | Status: SHIPPED | OUTPATIENT
Start: 2024-08-01

## 2024-08-01 RX ORDER — HYDROCODONE BITARTRATE AND ACETAMINOPHEN 7.5; 325 MG/1; MG/1
1 TABLET ORAL EVERY 8 HOURS PRN
Qty: 60 TABLET | Refills: 0 | Status: SHIPPED | OUTPATIENT
Start: 2024-08-29 | End: 2024-09-28

## 2024-08-01 NOTE — PROGRESS NOTES
[facility]     Subjective:    Chief Complaint:   No chief complaint on file.  The patient location is: home  The chief complaint leading to consultation is: pain    Visit type: audiovisual    Face to Face time with patient: 75  80 minutes of total time spent on the encounter, which includes face to face time and non-face to face time preparing to see the patient (eg, review of tests), Obtaining and/or reviewing separately obtained history, Documenting clinical information in the electronic or other health record, Independently interpreting results (not separately reported) and communicating results to the patient/family/caregiver, or Care coordination (not separately reported).         Each patient to whom he or she provides medical services by telemedicine is:  (1) informed of the relationship between the physician and patient and the respective role of any other health care provider with respect to management of the patient; and (2) notified that he or she may decline to receive medical services by telemedicine and may withdraw from such care at any time.    Notes:       History of Present Illness:  73 y.o. female presents for multiple issues. HPI:   Nelly Tian is a 73 year old female with past medical history of AFib/a flutter on Eliquis, CHF, COPD, diabetes mellitus, recurrent abdominal cellulitis and wound care management, hypertension, iron-deficiency anemia, hyperlipidemia, MI, anxiety, and arthritis who presents with shortness on breath associated with cough and lower abdominal pain for a couple hours.  She denies chest pain, fever, chills, dizziness, lightheadedness, nausea or vomiting.  ED workup significant for CT Chest large left and small right lung concerning for pneumonia, CT abdomen/pelvis chronic large abdominal wall hernia containing numerous organs without obstruction.  ECG atrial fibrillation with controlled rate, trop 0.011.   4/29/24  Last visit in May   Patient presents today  with daughter for hospital follow up. Last visit with [facility]- on 2/23/24. She was admitted due to sepsis and abdominal wall cellulitis ID advised IV Daptomycin 800 mg daily for 10 days through May 4th then stop . She presented with complaints of left lower abdominal wound with orange/yellowish drainagewound after scratching her lower abdomen  -100.1, tachypneic, tachycardic, WBC 13.9, lactic acid 1.91, and urinalysis positive WBCs/leukocytes/bacteria culture pending. Chest x-ray significant for trace pulmonary vascular congestion. She was started on Rocephin and Zosyn.   Nasal MRSA came positive, mupirocin started.   Patient has medical history of AFib/a flutter on Eliquis, CHF, COPD, diabetes mellitus, recurrent abdominal cellulitis, hypertension, iron-deficiency anemia, hyperlipidemia, MI, anxiety, and arthritis            I reviewed the patients chart dating back for the past few appointments. See above.    The following portions of the patient's history were reviewed and updated as appropriate: allergies, current medications, past family history, past medical history, past social history, past surgical history and problem list.    She denies chest pain upon exertion, dyspnea, nausea, vomiting, diaphoresis, and syncope. No pleuritic chest pain, unilateral leg swelling, calf tenderness, or calf pain.      Past Medical History:   Diagnosis Date    *Atrial flutter     Angina pectoris 9/18/2017    Anxiety     Arthritis     Asthma     Atrial fibrillation     Back pain     Cataract     OD    Chest pain 9/17/2017    CHF (congestive heart failure)     Chronically on benzodiazepine therapy 5/4/2019    COPD (chronic obstructive pulmonary disease)     Depression     Diabetes mellitus     Emphysema of lung     Heart failure     Hepatomegaly 2/3/2016    Hernia     History of MI (myocardial infarction) 1/19/2016    Hypercapnic respiratory failure, chronic 11/16/2016    Hyperlipidemia     Hypertension     Iron deficiency  anemia 2/3/2016    Myocardial infarction     Obesity     Peripheral vascular disease     Pneumonia     Polyneuropathy     Retinal detachment     OS    Septic shock 2017    Skin ulcer 3/18/2017    Tobacco dependence     Type II 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, not stated as uncontrolled(250.60)        Past Surgical History:   Procedure Laterality Date    BREAST BIOPSY Left 10 yrs ago    benign    CATARACT EXTRACTION Left     OS     CATARACT EXTRACTION W/  INTRAOCULAR LENS IMPLANT Right 2023    Procedure: CEIOL OD;  Surgeon: David Bautista MD;  Location: [facility];  Service: Ophthalmology;  Laterality: Right;     SECTION      x2    EYE SURGERY      HYSTERECTOMY      partial    OOPHORECTOMY      one ovary conserved    RETINAL DETACHMENT SURGERY      buckle --OS    TONSILLECTOMY         Social History  Social History     Tobacco Use    Smoking status: Former     Current packs/day: 0.00     Average packs/day: 0.3 packs/day for 54.4 years (16.3 ttl pk-yrs)     Types: Cigarettes     Start date: 1968     Quit date: 2022     Years since quittin.0    Smokeless tobacco: Never   Substance Use Topics    Alcohol use: No     Alcohol/week: 0.0 standard drinks of alcohol    Drug use: No       Family History   Problem Relation Name Age of Onset    Alcohol abuse Mother      Cirrhosis Mother      Arthritis Father      Heart disease Father      Heart attack Father      Arrhythmia Father      Coronary artery disease Father      Coronary artery disease Sister      Early death Sister  30        heart disease    Heart disease Sister  32        MI    Heart attack Sister      Arthritis Sister      Heart disease Sister      Crohn's disease Sister      Cancer Brother          Lung cancer    Lung cancer Brother      No Known Problems Brother      No Known Problems Daughter x1     Blindness Son x1         due to accident//    Breast cancer Neg Hx      Glaucoma Neg Hx       Macular degeneration Neg Hx      Retinal detachment Neg Hx      Amblyopia Neg Hx      Diabetes Neg Hx      Cataracts Neg Hx      Hypertension Neg Hx      Strabismus Neg Hx      Stroke Neg Hx      Thyroid disease Neg Hx       Review of patient's allergies indicates:   Allergen Reactions    Dilaudid [hydromorphone] Anaphylaxis     Other reaction(s): Anaphylaxis  Other reaction(s): Unknown    Zyvox [linezolid in dextrose 5%] Shortness Of Breath     Is the problem list complete?:Yes  Is the medication list complete?:Yes  Is the family history complete?:Yes  Do we have a list of the patient's other physicians?:Yes    Depression screen completed?:Yes  Functional assessment completed?:Yes  BP, weight, BMI measured?:Yes  Risk factors identified?:Yes  Immunizations updated and ordered if indicated?:Yes  Preventive checklist updated?:Yes  New schedule of preventive and early detection interventions made?:Yes  Advance directives discussed?:Yes  Referrals made?:No Referrals to:  n/a  . Noted: 11/2/2016   Last Assessment & Plan NoteWritten:Ree Corrales MD8/17/2022 8:46 AM  Advance Care Planning    Date: 08/17/2022    Code Status  In light of the patients advanced and life limiting illness,I engaged the the patient in a conversation about the patient's preferences for care at the very end of life. The patient wishes to have a natural, peaceful death. Along those lines, the patient does not wish to have CPR or other invasive treatments performed when her heart and/or breathing stops. I communicated to the patient that a DNR order would be placed in her medical record to reflect this preference. I spent a total of 20 minutes engaging the patient in this advance care planning discussio      4Ms for Medical Decision-Making in Older Adults    Last Completed EAWV: 11/28/2022    MOBILITY:  Get Up and Go:      8/22/2018    11:43 AM 12/19/2017    12:31 PM   Get Up and Go   Trial 1 13 seconds 15 seconds     Activities of Daily  Livin/28/2022     3:07 PM   Activities of Daily Living   Ambulation Independent   Dressing Independent   Transfers Independent   Toileting Continent of bladder;Continent of bowel   Feeding Independent   Cleaning home/Chores Independent   Telephone use Independent   Shopping Independent   Paying bills Independent   Taking meds Independent     Whisper Test:      2018    11:38 AM   Whisper Test   Whisper Test Normal     Disability Status:      2023     9:44 AM   Disability Status   Are you deaf or do you have serious difficulty hearing? N   Are you blind or do you have serious difficulty seeing, even when wearing glasses? N   Because of a physical, mental, or emotional condition, do you have serious difficulty concentrating, remembering, or making decisions? N   Do you have serious difficulty walking or climbing stairs? Y   Do you have difficulty dressing or bathing? N   Because of a physical, mental, or emotional condition, do you have difficulty doing errands alone such as visiting a doctor's office or shopping? Y     Nutrition Screenin/28/2022     3:07 PM   Nutrition Screening   Has food intake declined over the past three months due to loss of appetite, digestive problems, chewing or swallowing difficulties? No decrease in food intake   Involuntary weight loss during the last 3 months? No weight loss   Mobility? Goes out   Has the patient suffered psychological stress or acute disease in the past three months? No   Neuropsychological problems? No psychological problems   Body Mass Index (BMI)?  BMI 23 or greater   Screening Score 14   Interpretation Normal nutritional status    Screening Score: 0-7 Malnourished, 8-11 At Risk, 12-14 Normal    MENTATION:   Depression Patient Health Questionnaire:      2024     8:17 AM   Depression Patient Health Questionnaire   Over the last two weeks how often have you been bothered by little interest or pleasure in doing things Not at all   Over the  last two weeks how often have you been bothered by feeling down, depressed or hopeless Not at all   PHQ-2 Total Score 0     Has Dementia Dx: No    Cognitive Function Screenin/28/2022     3:10 PM   Cognitive Function Screening   Mini-Cog 3 Minute Recall 3   Cognitive Function Screening 5     Cognitive Function Screening Total - Less than 4 = Abnormal,  Greater than or equal to 4 = Normal    MEDICATIONS:  High Risk Medications:  Total Active Medications: 3  gabapentin - 800 MG  HYDROcodone-acetaminophen - 7.5-325 mg  QUEtiapine Tab - 25 MG    WHAT MATTERS MOST:  Advance Care Planning   ACP Status:   Patient has had an ACP conversation  Living Will: No  Power of : No  La[place]: No    What is most important right now is to focus on remaining as independent as possible, symptom/pain control, and quality of life, even if it means sacrificing a little time    Accordingly, we have decided that the best plan to meet the patient's goals includes continuing with treatment      What matters most to patient today is: activity             Review of Systems [Negative unless checked off]    General ROS: []fever, []chills, []weight loss, [x]malaise/fatigue.  ENT ROS: []congestion, []rhinorrhea,  []sore throat, []neck pain, []hearing loss.  Ophthalmic ROS:[]blurry vision, [] double vision, []photophobia, []eye pain.  Respiratory ROS: []cough, []pleuritic chest pain, [x]shortness of breath, []wheezing.  CVS ROS:[]chest pain, [x]dyspnea on exertion, []palpitations, []orthopnea, []leg swelling, []PND.   GI ROS: []nausea, []vomiting, [] epigastric pain, []abd pain, []diarrhea, []constipation, []blood/melena in stool.   Urology ROS:[]dysuria, []frequency, []flank pain,[] trouble voiding, [] hematuria.   MSK ROS: []myalgias, []joint pain, []muscular weakness,  []back pain, [] falls.   Derm ROS: []pruritis, []rash, []jaundice.  Neurological:[]dizziness,[]numbness,[]loss of consciousness, []weakness  []headaches.   Psych ROS:  "[]hallucinations, []depression, []anxiety, []suicidal ideation.    Physical Examination  LMP  (LMP Unknown)   Wt Readings from Last 3 Encounters:   05/08/24 120.6 kg (265 lb 14 oz)   05/06/24 (!) 158.8 kg (350 lb)   04/29/24 (!) 158.8 kg (350 lb)     BP Readings from Last 3 Encounters:   05/08/24 130/70   05/06/24 (!) (P) 140/82   04/29/24 136/63     Estimated body mass index is 40.43 kg/m² as calculated from the following:    Height as of 5/8/24: 5' 8" (1.727 m).    Weight as of 5/8/24: 120.6 kg (265 lb 14 oz).           Abdomen: soft, nontender, nondistended, no masses or organomegaly  Huge huge ventral hernia, no signs of cellulitis No rigidity, rebound, or guarding.   Back: full range of motion, no tenderness, palpable spasm or pain on motion   Extremities: peripheral pulses normal, no pedal edema, no clubbing or cyanosis, no pedal edema noted, no ulcers, gangrene or atrophic changes   Feet: warm, good capillary refill.  Neurological:alert, oriented, normal speech, no focal findings or movement disorder noted, screening mental status exam normal, neck supple without rigidity, cranial nerves II through XII intact   Psychiatric: alert, oriented to person, place, and time, anxious  Integument: normal coloration and turgor, no rashes, no suspicious skin lesions noted.    Data reviewed    Previous medical records reviewed and summarized above in HPI. 274}    Laboratory    I have reviewed old labs below:   274}  Lab Results   Component Value Date    WBC 10.37 05/20/2024    HGB 14.1 05/20/2024    HCT 43.9 05/20/2024    MCV 96 05/20/2024     05/20/2024     (L) 05/27/2024    K 4.4 05/27/2024    CL 91 (L) 05/27/2024    CALCIUM 10.7 (H) 05/27/2024    PHOS 3.9 04/22/2024    CO2 29 05/27/2024    GLU 78 05/27/2024    BUN 8 05/27/2024    CREATININE 0.6 05/27/2024    ANIONGAP 12 05/27/2024    ESTGFRAFRICA >60.0 07/22/2022    EGFRNONAA >60.0 07/22/2022    PROT 7.8 05/27/2024    ALBUMIN 3.3 (L) 05/27/2024    BILITOT " 0.6 05/27/2024    ALKPHOS 70 05/27/2024    ALT 14 05/27/2024    AST 16 05/27/2024    INR 1.0 04/21/2024    CHOL 89 (L) 03/25/2022    TRIG 52 03/25/2022    HDL 37 (L) 03/25/2022    LDLCALC 41.6 (L) 03/25/2022    TSH 1.309 01/11/2021    HGBA1C 5.4 05/20/2024       Imaging/Tracing: I have reviewed the pertinent results/findings and my personal findings are below:    X-Ray Chest AP Portable  Narrative: EXAMINATION:  XR CHEST AP PORTABLE    CLINICAL HISTORY:  Sepsis;    TECHNIQUE:  Single view of the chest was obtained.    COMPARISON:  Multiple priors, most recent 02/23/2024    FINDINGS:  Unchanged cardiomegaly.  Atherosclerotic calcification of the thoracic aorta.  Low lung volumes with likely basilar atelectasis.  No large pleural effusion or pneumothorax.  No focal consolidation.  Mild interstitial opacities.  Impression: Cardiomegaly with likely mild pulmonary edema.    The preliminary and final reports are concordant.    Electronically signed by: Lore Nieves  Date:    04/21/2024  Time:    07:13    274}    Assessment/Plan     274}    Nelly Tian is a 73 y.o. female who presents to clinic with:    1. Type 2 diabetes mellitus with diabetic peripheral angiopathy without gangrene, without long-term current use of insulin    2. Hypertension associated with diabetes    3. Iron deficiency anemia, unspecified iron deficiency anemia type    4. Encephalopathy, metabolic    5. Chronic combined systolic and diastolic CHF (congestive heart failure)    6. Abnormal finding of blood chemistry, unspecified         Diagnostic impression remarks       1. Type 2 diabetes mellitus with diabetic peripheral angiopathy without gangrene, without long-term current use of insulin  - B-TYPE NATRIURETIC PEPTIDE; Future  - CBC W/ AUTO DIFFERENTIAL; Future  - HEMOGLOBIN A1C; Future  - TSH; Future    2. Hypertension associated with diabetes    3. Iron deficiency anemia, unspecified iron deficiency anemia type    4. Encephalopathy,  metabolic  - WALKER FOR HOME USE  - WHEELCHAIR FOR HOME USE    5. Chronic combined systolic and diastolic CHF (congestive heart failure)  - WALKER FOR HOME USE  - WHEELCHAIR FOR HOME USE    6. Abnormal finding of blood chemistry, unspecified  - CBC W/ AUTO DIFFERENTIAL; Future      Medication Monitoring: In today's visit, monitoring for drug toxicity was accomplished. Proper use of medications was discussed.     Counseling: Counseling included discussion regarding imaging findings, diagnosis, possibilities, treatment options, medications, risks, and benefits. She had many questions regarding the options and long-term effects. All questions were answered. She expressed understanding after counseling regarding the diagnosis and recommendations. She was capable and demonstrated competence with understanding of these options. Shared decision making was performed resulting in her choosing the current treatment plan.     She was counseled about the importance of healthy dietary habits as well as routine physical activity and exercise for better health outcomes. I also discussed the importance of cancer screening.     I also discussed the importance of close follow up to discuss labs, change or modify her medications if needed, monitor side effects, and further evaluation of medical problems.     Additional workup planned: see labs ordered below.    See below for labs and meds ordered with associated diagnosis      Medication List with Changes/Refills   Current Medications    ALBUTEROL (PROVENTIL/VENTOLIN HFA) 90 MCG/ACTUATION INHALER    Inhale 2 puffs into the lungs every 6 (six) hours as needed for Wheezing (Rescue). Rescue    ALBUTEROL-IPRATROPIUM (DUO-NEB) 2.5 MG-0.5 MG/3 ML NEBULIZER SOLUTION    Take 3 mLs by nebulization every 6 (six) hours as needed for Wheezing. Rescue    ASCORBIC ACID, VITAMIN C, (VITAMIN C) 500 MG TABLET    Take 2 tablets (1,000 mg total) by mouth every evening.    ATORVASTATIN (LIPITOR) 10 MG  TABLET    Take 1 tablet (10 mg total) by mouth every other day.    BLOOD SUGAR DIAGNOSTIC STRP    To check BG 2 times daily, to use with insurance preferred meter    BLOOD-GLUCOSE METER KIT    To check BG 2 times daily, to use with insurance preferred meter    CEFADROXIL (DURICEF) 500 MG CAP    Take 1 capsule (500 mg total) by mouth every 12 (twelve) hours.    ELIQUIS 5 MG TAB    TAKE 1 TABLET TWICE DAILY    EMPAGLIFLOZIN (JARDIANCE) 25 MG TABLET    Take 1 tablet (25 mg total) by mouth once daily.    FLUCONAZOLE (DIFLUCAN) 100 MG TABLET    Take 2 tablets (200 mg total) by mouth once daily. for 15 days    FLUTICASONE PROPIONATE (FLONASE) 50 MCG/ACTUATION NASAL SPRAY    SHAKE LIQUID AND USE 1 SPRAY IN EACH NOSTRIL EVERY DAY    FLUTICASONE-UMECLIDIN-VILANTER (TRELEGY ELLIPTA) 100-62.5-25 MCG DSDV    Inhale 1 puff into the lungs once daily. EXPIRES 42 DAYS AFTER OPENING FOIL TRAY    FUROSEMIDE (LASIX) 40 MG TABLET    Take 1 tablet (40 mg total) by mouth once daily.    GABAPENTIN (NEURONTIN) 800 MG TABLET    TAKE 1 TABLET(800 MG) BY MOUTH THREE TIMES DAILY    HYDROCODONE-ACETAMINOPHEN (NORCO) 7.5-325 MG PER TABLET    Take 1 tablet by mouth every 12 (twelve) hours as needed for Pain. Medical necessary for greater than 7 days for chronic pain.    HYDROXYZINE HCL (ATARAX) 25 MG TABLET    Take 1 tablet (25 mg total) by mouth nightly as needed for Itching.    LACTOBACILLUS ACIDOPHILUS 1 BILLION CELL CAP    Take 1 capsule by mouth 3 (three) times daily with meals.    LACTULOSE (CHRONULAC) 10 GRAM/15 ML SOLUTION    TAKE 15 MLS ONCE DAILY    LANCETS MISC    To check BG 2 times daily, to use with insurance preferred meter    LIDOCAINE (LIDODERM) 5 %    APPLY PATCH ONTO THE SKIN. REMOVE AND DISCARD PATCH WITHIN 12 HOURS OR AS DIRECTED BY MD    LISINOPRIL (PRINIVIL,ZESTRIL) 20 MG TABLET    Take 1 tablet (20 mg total) by mouth once daily.    MELATONIN 10 MG TAB    Take 10 mg by mouth nightly.    METFORMIN (GLUCOPHAGE) 500 MG TABLET     TAKE 1 TABLET EVERY DAY WITH BREAKFAST    MULTIVITAMIN (THERAGRAN) TABLET    Take 1 tablet by mouth once daily.    NYSTATIN (NYSTOP) POWDER    APPLY TOPICALLY TO THE AFFECTED AREA TWICE DAILY    NYSTATIN-TRIAMCINOLONE (MYCOLOG II) CREAM    APPLY TOPICALLY TO THE AFFECTED AREA THREE TIMES DAILY    ONDANSETRON (ZOFRAN-ODT) 8 MG TBDL    DISSOLVE 1 TABLET ON THE TONGUE EVERY 12 HOURS AS NEEDED    POTASSIUM CHLORIDE SA (K-DUR,KLOR-CON) 20 MEQ TABLET    Take 1 tablet (20 mEq total) by mouth once daily.    QUETIAPINE (SEROQUEL) 25 MG TAB    Take 1 tablet (25 mg total) by mouth nightly.    SPIRONOLACTONE (ALDACTONE) 25 MG TABLET    Take 1 tablet (25 mg total) by mouth 3 (three) times a week.    TIRZEPATIDE (MOUNJARO) 2.5 MG/0.5 ML PNIJ    Inject 2.5 mg into the skin every 7 days.     Modified Medications    No medications on file   X-Ray Chest AP Portable  Narrative: EXAMINATION:  XR CHEST AP PORTABLE    CLINICAL HISTORY:  Sepsis;    TECHNIQUE:  Single view of the chest was obtained.    COMPARISON:  Multiple priors, most recent 02/23/2024    FINDINGS:  Unchanged cardiomegaly.  Atherosclerotic calcification of the thoracic aorta.  Low lung volumes with likely basilar atelectasis.  No large pleural effusion or pneumothorax.  No focal consolidation.  Mild interstitial opacities.  Impression: Cardiomegaly with likely mild pulmonary edema.    The preliminary and final reports are concordant.    Electronically signed by: Lore Nieves  Date:    04/21/2024  Time:    07:13    Follow up in about 6 weeks (around 9/12/2024), or Labs by HOME health in AM .. for further workup and reassessment if labs and tests obtained are stable or sooner as needed. She was instructed to call the clinic or go to the emergency department if her symptoms do not improve, worsens, or if new symptoms develop. Patient knows to call any time if an emergency arises. Shared decision making occurred and she verbalized understanding in agreement with this plan.  "  I have checked the patient's  prior to prescribing opioids.    Documentation entered by me for this encounter may have been done in part using speech-recognition technology. Although I have made an effort to ensure accuracy, "sound like" errors may exist and should be interpreted in context.       Constantine Bird MD     Discussed health maintenance guidelines appropriate for age.  Discussed health maintenance guidelines appropriate for age.    "

## 2024-08-01 NOTE — PATIENT INSTRUCTIONS
"Vaccines for Adults   The Basics   Written by the doctors and editors at [facility]   What are vaccines? -- Vaccines can prevent certain serious or deadly infections. They are a way of teaching your body how to fight the germs that cause infections. Thanks to vaccines, many fewer people get seriously ill or die from infections than in the past.  Vaccines usually come in shots, but some come in nose sprays or medicines you swallow. When a person gets a vaccine, this is called "vaccination" or "immunization."  Why should I get vaccinated? -- Getting vaccinated can help keep you from getting certain infections. If you do get an infection, being vaccinated can also keep you from getting severely ill.  In some cases, being vaccinated also helps protect other people around you. For diseases that can spread from person to person, the goal of vaccines is to get to "herd immunity." Herd immunity is when enough people are immune to a disease that it can no longer spread easily. To get to herd immunity, lots of people need to get vaccinated. This helps protect people who cannot get vaccinated for some reason.  Which vaccines should I get? -- Your doctor or nurse will tell you which vaccines you should get.  There are some vaccines that all adults should get, even if they got their childhood vaccines. These vaccines protect against the following infections:  Coronavirus disease 2019 (COVID-19) - This is an infection caused by a virus called SARS-CoV-2. It can cause a fever, cough, and trouble breathing, along with other symptoms. Some people get severely ill from COVID-19.  Influenza (flu) - The flu can cause fever, chills, muscle aches, cough, and sore throat. It can even cause a lung infection.  Pertussis - This infection is also known as "whooping cough" and can cause a severe breathing illness in babies. It can also make older children and adults sick. Vaccinating adults helps prevent babies around them from getting the " "infection. The pertussis vaccine comes in the same shot as the diphtheria and tetanus vaccines.  Diphtheria and tetanus - Vaccines against these 2 diseases are usually together in 1 shot, or in a shot with the pertussis vaccine. Diphtheria can cause a thick covering in the back of the throat that can lead to breathing problems. Tetanus causes the muscles to work abnormally.   Some adults will need other vaccines, depending on their age, medical conditions, jobs, travel plans, and other factors. These can include vaccines to protect against:  Pneumococcus - Pneumococcus is a germ that can cause an infection of the lungs, ears, blood, or tissues around the brain.  Meningococcus - Meningococcus is a germ that can cause an infection of the blood or tissues around the brain.  Herpes zoster, also called "shingles" - Shingles can cause a painful skin rash and blisters.  Human papillomavirus (HPV) - HPV infection can lead to cancer of the cervix (in women). It can also cause genital warts in men and women. Young adults, especially women, should get this vaccine.  Other infections - These include measles, chickenpox, hepatitis B, and hepatitis A.  How many vaccine doses do I need? -- Each vaccine is different. Some vaccines work after just 1 dose. Others require 2 or more doses to prevent an infection.  Some vaccines prevent an infection for the rest of your life. Others do not. A "booster" is a vaccine dose that you get after a certain number of years. It reminds the body how to prevent an infection. People who got childhood vaccines sometimes need booster doses in adulthood. People who travel to other countries also sometimes need booster doses of certain vaccines.  When should I be vaccinated? -- Different vaccines are given at different ages. Your doctor or nurse will recommend a vaccine schedule that is right for you. For most vaccines, it takes a couple of weeks before you are fully protected. This is because it takes " time for your body to prepare to fight the infection.  Do vaccines cause side effects? -- They can. Often vaccines cause no side effects, but sometimes they do. When side effects happen, they can include:  Redness, mild swelling, or soreness where you got the shot  A mild fever  A mild rash  Headache or body aches  These side effects do not mean you are sick, just that your immune system (infection-fighting system) is responding to the vaccine.  Vaccines also sometimes cause more serious side effects, such as severe allergic reactions. But serious side effects are rare.  Ask your doctor or nurse what side effects to expect each time you get a vaccine. If you have a reaction or a problem after a vaccine, let them know.  What if I am pregnant or want to get pregnant? -- If you want to get pregnant, ask your doctor or nurse if you need any vaccines. Some vaccines must be given before pregnancy. Others are important to get during pregnancy. Getting the right vaccines before and during pregnancy can protect you and your baby from serious infections.  All topics are updated as new evidence becomes available and our peer review process is complete.  This topic retrieved from [employer] on: Sep 21, 2021.  Topic 78078 Version 15.0  Release: 29.4.2 - C29.263  © 2021 UpToDate, Inc. and/or its affiliates. All rights reserved.  Consumer Information Use and Disclaimer   This information is not specific medical advice and does not replace information you receive from your health care provider. This is only a brief summary of general information. It does NOT include all information about conditions, illnesses, injuries, tests, procedures, treatments, therapies, discharge instructions or life-style choices that may apply to you. You must talk with your health care provider for complete information about your health and treatment options. This information should not be used to decide whether or not to accept your health care provider's  advice, instructions or recommendations. Only your health care provider has the knowledge and training to provide advice that is right for you. The use of this information is governed by the [employer] End User License Agreement, available at https://www.[employer]/en/solutions/[employer]/about/refugio.The use of [employer] content is governed by the [employer] Terms of Use. ©2021 UpToDate, Inc. All rights reserved.  Copyright   © 2021 UpToDate, Inc. and/or its affiliates. All rights reserved.

## 2024-08-05 ENCOUNTER — TELEPHONE (OUTPATIENT)
Dept: PRIMARY CARE CLINIC | Facility: CLINIC | Age: 74
End: 2024-08-05
Payer: MEDICARE

## 2024-08-05 ENCOUNTER — LAB VISIT (OUTPATIENT)
Dept: LAB | Facility: HOSPITAL | Age: 74
End: 2024-08-05
Attending: FAMILY MEDICINE
Payer: MEDICARE

## 2024-08-05 DIAGNOSIS — E11.9 DIABETES MELLITUS WITHOUT COMPLICATION: Primary | ICD-10-CM

## 2024-08-05 DIAGNOSIS — E11.51 TYPE II DIABETES MELLITUS WITH PERIPHERAL CIRCULATORY DISORDER: ICD-10-CM

## 2024-08-05 LAB
BACTERIA #/AREA URNS HPF: ABNORMAL /HPF
BASOPHILS # BLD AUTO: 0.03 K/UL (ref 0–0.2)
BASOPHILS NFR BLD: 0.5 % (ref 0–1.9)
BILIRUB UR QL STRIP: NEGATIVE
BNP SERPL-MCNC: 169 PG/ML (ref 0–99)
CLARITY UR: CLEAR
COLOR UR: YELLOW
DIFFERENTIAL METHOD BLD: ABNORMAL
EOSINOPHIL # BLD AUTO: 0.3 K/UL (ref 0–0.5)
EOSINOPHIL NFR BLD: 4.1 % (ref 0–8)
ERYTHROCYTE [DISTWIDTH] IN BLOOD BY AUTOMATED COUNT: 13.8 % (ref 11.5–14.5)
ESTIMATED AVG GLUCOSE: 114 MG/DL (ref 68–131)
GLUCOSE UR QL STRIP: ABNORMAL
HBA1C MFR BLD: 5.6 % (ref 4.5–6.2)
HCT VFR BLD AUTO: 42 % (ref 37–48.5)
HGB BLD-MCNC: 12.6 G/DL (ref 12–16)
HGB UR QL STRIP: NEGATIVE
HYALINE CASTS #/AREA URNS LPF: 4 /LPF
IMM GRANULOCYTES # BLD AUTO: 0.02 K/UL (ref 0–0.04)
IMM GRANULOCYTES NFR BLD AUTO: 0.3 % (ref 0–0.5)
KETONES UR QL STRIP: NEGATIVE
LEUKOCYTE ESTERASE UR QL STRIP: NEGATIVE
LYMPHOCYTES # BLD AUTO: 1.6 K/UL (ref 1–4.8)
LYMPHOCYTES NFR BLD: 25.8 % (ref 18–48)
MCH RBC QN AUTO: 29.4 PG (ref 27–31)
MCHC RBC AUTO-ENTMCNC: 30 G/DL (ref 32–36)
MCV RBC AUTO: 98 FL (ref 82–98)
MICROSCOPIC COMMENT: ABNORMAL
MONOCYTES # BLD AUTO: 0.9 K/UL (ref 0.3–1)
MONOCYTES NFR BLD: 14.9 % (ref 4–15)
NEUTROPHILS # BLD AUTO: 3.4 K/UL (ref 1.8–7.7)
NEUTROPHILS NFR BLD: 54.4 % (ref 38–73)
NITRITE UR QL STRIP: NEGATIVE
NON-SQ EPI CELLS #/AREA URNS HPF: 1 /HPF
NRBC BLD-RTO: 0 /100 WBC
PH UR STRIP: 5 [PH] (ref 5–8)
PLATELET # BLD AUTO: 238 K/UL (ref 150–450)
PMV BLD AUTO: 10.3 FL (ref 9.2–12.9)
PROT UR QL STRIP: NEGATIVE
RBC # BLD AUTO: 4.29 M/UL (ref 4–5.4)
RBC #/AREA URNS HPF: 1 /HPF (ref 0–4)
SP GR UR STRIP: 1.02 (ref 1–1.03)
SQUAMOUS #/AREA URNS HPF: 1 /HPF
TSH SERPL DL<=0.005 MIU/L-ACNC: 2.03 UIU/ML (ref 0.4–4)
URN SPEC COLLECT METH UR: ABNORMAL
UROBILINOGEN UR STRIP-ACNC: NEGATIVE EU/DL
WBC # BLD AUTO: 6.17 K/UL (ref 3.9–12.7)
WBC #/AREA URNS HPF: 7 /HPF (ref 0–5)
YEAST URNS QL MICRO: ABNORMAL

## 2024-08-05 PROCEDURE — 36415 COLL VENOUS BLD VENIPUNCTURE: CPT | Performed by: FAMILY MEDICINE

## 2024-08-05 PROCEDURE — 85025 COMPLETE CBC W/AUTO DIFF WBC: CPT | Performed by: FAMILY MEDICINE

## 2024-08-05 PROCEDURE — 83880 ASSAY OF NATRIURETIC PEPTIDE: CPT | Performed by: FAMILY MEDICINE

## 2024-08-05 PROCEDURE — 84443 ASSAY THYROID STIM HORMONE: CPT | Performed by: FAMILY MEDICINE

## 2024-08-05 PROCEDURE — 83036 HEMOGLOBIN GLYCOSYLATED A1C: CPT | Performed by: FAMILY MEDICINE

## 2024-08-05 PROCEDURE — 81000 URINALYSIS NONAUTO W/SCOPE: CPT | Performed by: FAMILY MEDICINE

## 2024-08-06 ENCOUNTER — PATIENT OUTREACH (OUTPATIENT)
Dept: ADMINISTRATIVE | Facility: OTHER | Age: 74
End: 2024-08-06
Payer: MEDICARE

## 2024-08-07 ENCOUNTER — TELEPHONE (OUTPATIENT)
Dept: PRIMARY CARE CLINIC | Facility: CLINIC | Age: 74
End: 2024-08-07
Payer: MEDICARE

## 2024-08-07 DIAGNOSIS — M51.36 DDD (DEGENERATIVE DISC DISEASE), LUMBAR: Chronic | ICD-10-CM

## 2024-08-07 DIAGNOSIS — L03.311 CELLULITIS OF LEFT ABDOMINAL WALL: ICD-10-CM

## 2024-08-07 RX ORDER — HYDROCODONE BITARTRATE AND ACETAMINOPHEN 7.5; 325 MG/1; MG/1
1 TABLET ORAL EVERY 8 HOURS PRN
Qty: 60 TABLET | Refills: 0 | Status: SHIPPED | OUTPATIENT
Start: 2024-09-29 | End: 2024-10-29

## 2024-08-08 ENCOUNTER — PATIENT MESSAGE (OUTPATIENT)
Dept: PRIMARY CARE CLINIC | Facility: CLINIC | Age: 74
End: 2024-08-08
Payer: MEDICARE

## 2024-08-08 ENCOUNTER — PATIENT OUTREACH (OUTPATIENT)
Dept: ADMINISTRATIVE | Facility: OTHER | Age: 74
End: 2024-08-08
Payer: MEDICARE

## 2024-08-09 ENCOUNTER — TELEPHONE (OUTPATIENT)
Dept: ADMINISTRATIVE | Facility: OTHER | Age: 74
End: 2024-08-09
Payer: MEDICARE

## 2024-08-19 ENCOUNTER — PATIENT OUTREACH (OUTPATIENT)
Dept: ADMINISTRATIVE | Facility: OTHER | Age: 74
End: 2024-08-19
Payer: MEDICARE

## 2024-08-19 NOTE — PROGRESS NOTES
CHW - Case Closure    This LPN spoke to patient via telephone today.   Pt/Caregiver reported: Spoke to patient. States hasn't heard anything further about the walker. Asked pt to call her pcp if she does not hear from [facility] within 1 week. Notes were faxed from clinic.   Pt/Caregiver denied any additional needs at this time and agrees with episode closure at this time.  Provided patient with Community Health Worker's contact information and encouraged him/her to contact this Community Health Worker if additional needs arise.

## 2024-08-24 DIAGNOSIS — G47.00 INSOMNIA, UNSPECIFIED TYPE: ICD-10-CM

## 2024-08-24 DIAGNOSIS — J44.9 CHRONIC OBSTRUCTIVE PULMONARY DISEASE, UNSPECIFIED COPD TYPE: ICD-10-CM

## 2024-08-24 DIAGNOSIS — L03.90 RECURRENT CELLULITIS: ICD-10-CM

## 2024-08-24 DIAGNOSIS — J96.11 CHRONIC RESPIRATORY FAILURE WITH HYPOXIA: ICD-10-CM

## 2024-08-24 RX ORDER — FLUTICASONE FUROATE, UMECLIDINIUM BROMIDE AND VILANTEROL TRIFENATATE 100; 62.5; 25 UG/1; UG/1; UG/1
POWDER RESPIRATORY (INHALATION)
Qty: 180 EACH | Refills: 2 | Status: SHIPPED | OUTPATIENT
Start: 2024-08-24

## 2024-08-24 NOTE — TELEPHONE ENCOUNTER
Refill Decision Note   Nelly Tian  is requesting a refill authorization.  Brief Assessment and Rationale for Refill:  Approve     Medication Therapy Plan:        Comments:     Note composed:4:13 PM 08/24/2024

## 2024-08-24 NOTE — TELEPHONE ENCOUNTER
No care due was identified.  [facility] Embedded Care Due Messages. Reference number: [id number].   8/24/2024 4:05:09 PM CDT

## 2024-08-26 RX ORDER — CEFADROXIL 500 MG/1
500 CAPSULE ORAL EVERY 12 HOURS
Qty: 180 CAPSULE | Refills: 3 | Status: SHIPPED | OUTPATIENT
Start: 2024-08-26 | End: 2025-08-26

## 2024-08-26 NOTE — TELEPHONE ENCOUNTER
Refill Routing Note   Medication(s) are not appropriate for processing by [facility] for the following reason(s):        Outside of protocol    [facility] action(s):  Route             Appointments  past 12m or future 3m with PCP    Date Provider   Last Visit   8/1/2024 Constantine Bird MD   Next Visit   Visit date not found Constantine Bird MD   ED visits in past 90 days: 0        Note composed:8:02 AM 08/26/2024

## 2024-08-26 NOTE — TELEPHONE ENCOUNTER
No care due was identified.  [facility] Embedded Care Due Messages. Reference number: [id number].   8/26/2024 8:02:02 AM CDT

## 2024-08-28 RX ORDER — QUETIAPINE FUMARATE 25 MG/1
25 TABLET, FILM COATED ORAL NIGHTLY
Qty: 30 TABLET | Refills: 3 | Status: SHIPPED | OUTPATIENT
Start: 2024-08-28 | End: 2024-12-26

## 2024-09-05 DIAGNOSIS — I50.42 CHRONIC COMBINED SYSTOLIC AND DIASTOLIC CHF (CONGESTIVE HEART FAILURE): Primary | ICD-10-CM

## 2024-09-05 DIAGNOSIS — J43.9 MIXED RESTRICTIVE AND OBSTRUCTIVE LUNG DISEASE: ICD-10-CM

## 2024-09-05 DIAGNOSIS — J98.4 MIXED RESTRICTIVE AND OBSTRUCTIVE LUNG DISEASE: ICD-10-CM

## 2024-09-06 ENCOUNTER — EXTERNAL HOME HEALTH (OUTPATIENT)
Dept: HOME HEALTH SERVICES | Facility: HOSPITAL | Age: 74
End: 2024-09-06
Payer: MEDICARE

## 2024-09-06 DIAGNOSIS — I50.22 CHRONIC SYSTOLIC CONGESTIVE HEART FAILURE: ICD-10-CM

## 2024-09-06 DIAGNOSIS — Z51.81 THERAPEUTIC DRUG MONITORING: ICD-10-CM

## 2024-09-06 DIAGNOSIS — B37.2 YEAST DERMATITIS: ICD-10-CM

## 2024-09-06 DIAGNOSIS — M51.36 LUMBAR DEGENERATIVE DISC DISEASE: ICD-10-CM

## 2024-09-06 DIAGNOSIS — I50.30 DIASTOLIC CHF WITH PRESERVED LEFT VENTRICULAR FUNCTION, NYHA CLASS 2: ICD-10-CM

## 2024-09-06 DIAGNOSIS — E11.51 TYPE 2 DIABETES MELLITUS WITH DIABETIC PERIPHERAL ANGIOPATHY WITHOUT GANGRENE, WITHOUT LONG-TERM CURRENT USE OF INSULIN: ICD-10-CM

## 2024-09-09 RX ORDER — GABAPENTIN 800 MG/1
800 TABLET ORAL 3 TIMES DAILY
Qty: 90 TABLET | Refills: 11 | Status: SHIPPED | OUTPATIENT
Start: 2024-09-09

## 2024-09-09 RX ORDER — NYSTATIN 100000 [USP'U]/G
POWDER TOPICAL
Qty: 120 G | Refills: 11 | Status: SHIPPED | OUTPATIENT
Start: 2024-09-09

## 2024-09-09 RX ORDER — FUROSEMIDE 40 MG/1
40 TABLET ORAL DAILY
Qty: 90 TABLET | Refills: 2 | Status: SHIPPED | OUTPATIENT
Start: 2024-09-09

## 2024-09-09 RX ORDER — SPIRONOLACTONE 25 MG/1
25 TABLET ORAL
Qty: 36 TABLET | Refills: 2 | Status: SHIPPED | OUTPATIENT
Start: 2024-09-09

## 2024-09-09 RX ORDER — POTASSIUM CHLORIDE 20 MEQ/1
20 TABLET, EXTENDED RELEASE ORAL DAILY
Qty: 90 TABLET | Refills: 2 | Status: SHIPPED | OUTPATIENT
Start: 2024-09-09

## 2024-09-09 RX ORDER — NYSTATIN AND TRIAMCINOLONE ACETONIDE 100000; 1 [USP'U]/G; MG/G
CREAM TOPICAL 3 TIMES DAILY
Qty: 30 G | Refills: 5 | OUTPATIENT
Start: 2024-09-09

## 2024-09-09 NOTE — TELEPHONE ENCOUNTER
Care Due:                  Date            Visit Type   Department     Provider  --------------------------------------------------------------------------------                                ESTABLISHED                              PATIENT -  Last Visit: 08-      VIRTUAL      None Found     Constantine Bird  Next Visit: None Scheduled  None         None Found                                                            Last  Test          Frequency    Reason                     Performed    Due Date  --------------------------------------------------------------------------------    Lipid Panel.  12 months..  atorvastatin.............  03- 03-    [facility] Embedded Care Due Messages. Reference number: [id number].   9/09/2024 8:25:15 AM CDT

## 2024-09-09 NOTE — TELEPHONE ENCOUNTER
Refill Routing Note   Medication(s) are not appropriate for processing by [facility] for the following reason(s):        Outside of protocol    [facility] action(s):  Route  Approve   Requires labs : Yes             Appointments  past 12m or future 3m with PCP    Date Provider   Last Visit   8/1/2024 Constantine Bird MD   Next Visit   Visit date not found Constantine Bird MD   ED visits in past 90 days: 0        Note composed:8:38 AM 09/09/2024

## 2024-09-13 DIAGNOSIS — R09.81 CONGESTION OF NASAL SINUS: ICD-10-CM

## 2024-09-13 DIAGNOSIS — E11.59 HYPERTENSION ASSOCIATED WITH DIABETES: ICD-10-CM

## 2024-09-13 DIAGNOSIS — L03.90 RECURRENT CELLULITIS: ICD-10-CM

## 2024-09-13 DIAGNOSIS — Z51.81 THERAPEUTIC DRUG MONITORING: ICD-10-CM

## 2024-09-13 DIAGNOSIS — I15.2 HYPERTENSION ASSOCIATED WITH DIABETES: ICD-10-CM

## 2024-09-13 DIAGNOSIS — M51.36 LUMBAR DEGENERATIVE DISC DISEASE: ICD-10-CM

## 2024-09-14 NOTE — TELEPHONE ENCOUNTER
No care due was identified.  [facility] Embedded Care Due Messages. Reference number: [id number].   9/14/2024 6:05:10 PM CDT

## 2024-09-15 RX ORDER — FLUTICASONE PROPIONATE 50 MCG
1 SPRAY, SUSPENSION (ML) NASAL DAILY
Qty: 48 G | Refills: 3 | Status: SHIPPED | OUTPATIENT
Start: 2024-09-15

## 2024-09-15 NOTE — TELEPHONE ENCOUNTER
Refill Routing Note   Medication(s) are not appropriate for processing by [facility] for the following reason(s):        Outside of protocol  Required labs outdated  Drug-drug interaction    [facility] action(s):  Defer  Route  Approve      Medication Therapy Plan: Mycolog II & Gabapentin- OOP; Duplicate Therapy: albuterol, albuterol-ipratropium      Appointments  past 12m or future 3m with PCP    Date Provider   Last Visit   8/1/2024 Constantine Bird MD   Next Visit   Visit date not found Constantine Bird MD   ED visits in past 90 days: 0        Note composed:4:21 PM 09/15/2024

## 2024-09-16 ENCOUNTER — EXTERNAL HOME HEALTH (OUTPATIENT)
Dept: HOME HEALTH SERVICES | Facility: HOSPITAL | Age: 74
End: 2024-09-16
Payer: MEDICARE

## 2024-09-16 DIAGNOSIS — I10 HYPERTENSION, UNSPECIFIED TYPE: ICD-10-CM

## 2024-09-16 RX ORDER — METFORMIN HYDROCHLORIDE 500 MG/1
TABLET ORAL
Qty: 90 TABLET | Refills: 1 | Status: SHIPPED | OUTPATIENT
Start: 2024-09-16

## 2024-09-16 RX ORDER — CEFADROXIL 500 MG/1
500 CAPSULE ORAL EVERY 12 HOURS
Qty: 180 CAPSULE | Refills: 3 | Status: SHIPPED | OUTPATIENT
Start: 2024-09-16 | End: 2025-09-16

## 2024-09-16 NOTE — TELEPHONE ENCOUNTER
No care due was identified.  [facility] Embedded Care Due Messages. Reference number: [id number].   9/16/2024 12:54:37 AM CDT

## 2024-09-17 NOTE — TELEPHONE ENCOUNTER
Refill Routing Note   Medication(s) are not appropriate for processing by [facility] for the following reason(s):        Drug-disease interaction  Outside of protocol    [facility] action(s):  Defer  Route        Medication Therapy Plan: Drug-Disease: metFORMIN and Chronic respiratory failure with hypoxia    Pharmacist review requested: Yes     Appointments  past 12m or future 3m with PCP    Date Provider   Last Visit   8/1/2024 Constantine Bird MD   Next Visit   Visit date not found Constantine Bird MD   ED visits in past 90 days: 0        Note composed:9:58 PM 09/16/2024

## 2024-09-17 NOTE — TELEPHONE ENCOUNTER
Refill Routing Note   Medication(s) are not appropriate for processing by [facility] for the following reason(s):        Outside of protocol    [facility] action(s):  Route  Approve           Pharmacist review requested: Yes   Extended chart review required: Yes     Appointments  past 12m or future 3m with PCP    Date Provider   Last Visit   8/1/2024 Constantine Bird MD   Next Visit   Visit date not found Constantine Bird MD   ED visits in past 90 days: 0        Note composed:10:13 PM 09/16/2024                Yes

## 2024-09-18 RX ORDER — GABAPENTIN 800 MG/1
800 TABLET ORAL 3 TIMES DAILY
Qty: 90 TABLET | Refills: 11 | Status: SHIPPED | OUTPATIENT
Start: 2024-09-18

## 2024-09-18 RX ORDER — ALBUTEROL SULFATE 90 UG/1
2 INHALANT RESPIRATORY (INHALATION) EVERY 6 HOURS PRN
Qty: 20.1 G | Refills: 2 | Status: SHIPPED | OUTPATIENT
Start: 2024-09-18

## 2024-09-18 RX ORDER — APIXABAN 5 MG/1
5 TABLET, FILM COATED ORAL 2 TIMES DAILY
Qty: 180 TABLET | Refills: 5 | Status: SHIPPED | OUTPATIENT
Start: 2024-09-18

## 2024-09-18 RX ORDER — ATORVASTATIN CALCIUM 10 MG/1
10 TABLET, FILM COATED ORAL EVERY OTHER DAY
Qty: 45 TABLET | Refills: 0 | Status: SHIPPED | OUTPATIENT
Start: 2024-09-18

## 2024-09-18 RX ORDER — NYSTATIN AND TRIAMCINOLONE ACETONIDE 100000; 1 [USP'U]/G; MG/G
CREAM TOPICAL 3 TIMES DAILY
Qty: 30 G | Refills: 5 | OUTPATIENT
Start: 2024-09-18

## 2024-09-28 DIAGNOSIS — E11.59 HYPERTENSION ASSOCIATED WITH DIABETES: ICD-10-CM

## 2024-09-28 DIAGNOSIS — I50.30 DIASTOLIC CHF WITH PRESERVED LEFT VENTRICULAR FUNCTION, NYHA CLASS 2: ICD-10-CM

## 2024-09-28 DIAGNOSIS — I15.2 HYPERTENSION ASSOCIATED WITH DIABETES: ICD-10-CM

## 2024-09-28 DIAGNOSIS — I50.22 CHRONIC SYSTOLIC CONGESTIVE HEART FAILURE: ICD-10-CM

## 2024-09-28 DIAGNOSIS — E11.51 TYPE 2 DIABETES MELLITUS WITH DIABETIC PERIPHERAL ANGIOPATHY WITHOUT GANGRENE, WITHOUT LONG-TERM CURRENT USE OF INSULIN: ICD-10-CM

## 2024-09-28 NOTE — TELEPHONE ENCOUNTER
No care due was identified.  [facility] Embedded Care Due Messages. Reference number: [id number].   9/28/2024 10:32:09 AM CDT

## 2024-09-30 RX ORDER — HYDROXYZINE HYDROCHLORIDE 25 MG/1
25 TABLET, FILM COATED ORAL NIGHTLY PRN
Qty: 30 TABLET | Refills: 0 | Status: SHIPPED | OUTPATIENT
Start: 2024-09-30

## 2024-09-30 RX ORDER — TIRZEPATIDE 2.5 MG/.5ML
2.5 INJECTION, SOLUTION SUBCUTANEOUS
Qty: 12 PEN | Refills: 1 | Status: SHIPPED | OUTPATIENT
Start: 2024-09-30

## 2024-09-30 RX ORDER — ATORVASTATIN CALCIUM 10 MG/1
10 TABLET, FILM COATED ORAL EVERY OTHER DAY
Qty: 45 TABLET | Refills: 0 | Status: SHIPPED | OUTPATIENT
Start: 2024-09-30

## 2024-09-30 RX ORDER — SPIRONOLACTONE 25 MG/1
25 TABLET ORAL
Qty: 84 TABLET | Refills: 2 | Status: SHIPPED | OUTPATIENT
Start: 2024-09-30

## 2024-09-30 NOTE — TELEPHONE ENCOUNTER
Refill Routing Note   Medication(s) are not appropriate for processing by [facility] for the following reason(s):        Required labs outdated; LIPITOR  Outside of protocol; ATARAX  Drug-disease interaction    [facility] action(s):  Approve  Route  Defer        Medication Therapy Plan: empagliflozin and PVD (peripheral vascular disease); Hypertension associated with diabetes; Type 2 diabetes mellitus with diabetic peripheral angiopathy without gangrene, without long-term current use of insulin; spironolactone and Hyperkalemia      Appointments  past 12m or future 3m with PCP    Date Provider   Last Visit   8/1/2024 Constantine Bird MD   Next Visit   Visit date not found Constantine Bird MD   ED visits in past 90 days: 0        Note composed:2:18 PM 09/30/2024

## 2024-10-20 DIAGNOSIS — E11.51 TYPE 2 DIABETES MELLITUS WITH DIABETIC PERIPHERAL ANGIOPATHY WITHOUT GANGRENE, WITHOUT LONG-TERM CURRENT USE OF INSULIN: ICD-10-CM

## 2024-10-20 NOTE — TELEPHONE ENCOUNTER
No care due was identified.  [facility] Embedded Care Due Messages. Reference number: [id number].   10/20/2024 6:34:03 PM CDT

## 2024-10-21 ENCOUNTER — E-VISIT (OUTPATIENT)
Dept: PRIMARY CARE CLINIC | Facility: CLINIC | Age: 74
End: 2024-10-21
Payer: MEDICARE

## 2024-10-21 DIAGNOSIS — R05.8 COUGH PRODUCTIVE OF PURULENT SPUTUM: Primary | ICD-10-CM

## 2024-10-21 RX ORDER — PROMETHAZINE HYDROCHLORIDE AND DEXTROMETHORPHAN HYDROBROMIDE 6.25; 15 MG/5ML; MG/5ML
5 SYRUP ORAL 4 TIMES DAILY PRN
Qty: 118 ML | Refills: 1 | Status: SHIPPED | OUTPATIENT
Start: 2024-10-21 | End: 2024-11-02

## 2024-10-21 RX ORDER — AMOXICILLIN AND CLAVULANATE POTASSIUM 875; 125 MG/1; MG/1
1 TABLET, FILM COATED ORAL EVERY 12 HOURS
Qty: 14 TABLET | Refills: 0 | Status: SHIPPED | OUTPATIENT
Start: 2024-10-21 | End: 2024-10-28

## 2024-10-21 RX ORDER — TIRZEPATIDE 2.5 MG/.5ML
2.5 INJECTION, SOLUTION SUBCUTANEOUS
Qty: 12 PEN | Refills: 1 | Status: SHIPPED | OUTPATIENT
Start: 2024-10-21

## 2024-10-21 NOTE — PROGRESS NOTES
Patient ID: Nelly Tian is a 73 y.o. female.    Chief Complaint: General Illness (Entered automatically based on patient selection in [employer].)    The patient initiated a request through [employer] on 10/21/2024 for evaluation and management with a chief complaint of General Illness (Entered automatically based on patient selection in [employer].)     I evaluated the questionnaire submission on [employer].    [facility] Peq Evisit [facility]-Muscle,Back,Joint    10/21/2024 10:58 AM CDT - Filed by Patient   What do you need help with? Other Concern   Do you agree to participate in an E-Visit? Yes   If you have any of the following symptoms, please present to your local emergency room or call 911:  I acknowledge   What is the main issue you would like addressed today? Chest congestion   Please describe your symptoms Coughing up stuff   Where is your problem located? Chest   How severe are your symptoms? Moderate   Have you had these symptoms before? Yes   How long have you been having these symptoms? For a few days   Please list any medications or treatments you have used for your condition and indicate if it was effective or not.    What makes this feel better? Antibiotic   What makes this feel worse? Worse   Are these symptoms related to a condition that you currently have? Yes   What is the condition? Chest congestion   When were you last seen for this condition? 11/12/2003   Please describe any probable cause for these symptoms Cold   Provide any additional information you feel is important. I have a bad cold and i can't come in because I have ulcers on my side and can't walk   Please attach any relevant images or files    Are you able to take your vital signs? No         Encounter Diagnosis   Name Primary?    Cough productive of purulent sputum Yes        No orders of the defined types were placed in this encounter.     Medications Ordered This Encounter   Medications    amoxicillin-clavulanate 875-125mg (AUGMENTIN)  875-125 mg per tablet     Sig: Take 1 tablet by mouth every 12 (twelve) hours. for 7 days     Dispense:  14 tablet     Refill:  0    promethazine-dextromethorphan (PROMETHAZINE-DM) 6.25-15 mg/5 mL Syrp     Sig: Take 5 mLs by mouth 4 (four) times daily as needed (cough).     Dispense:  118 mL     Refill:  1        No follow-ups on file.      E-Visit Time Tracking:    Day 1 Time (in minutes): 20    Total Time (in minutes): 20

## 2024-10-21 NOTE — TELEPHONE ENCOUNTER
Refill Decision Note   Nelly Tian  is requesting a refill authorization.  Brief Assessment and Rationale for Refill:  Approve     Medication Therapy Plan:         Comments:     Note composed:9:25 AM 10/21/2024

## 2024-10-22 ENCOUNTER — OFFICE VISIT (OUTPATIENT)
Dept: PRIMARY CARE CLINIC | Facility: CLINIC | Age: 74
End: 2024-10-22
Payer: MEDICARE

## 2024-10-22 ENCOUNTER — PATIENT MESSAGE (OUTPATIENT)
Dept: PRIMARY CARE CLINIC | Facility: CLINIC | Age: 74
End: 2024-10-22

## 2024-10-22 DIAGNOSIS — E11.40 TYPE 2 DIABETES MELLITUS WITH DIABETIC NEUROPATHY, WITHOUT LONG-TERM CURRENT USE OF INSULIN: ICD-10-CM

## 2024-10-22 DIAGNOSIS — D50.9 IRON DEFICIENCY ANEMIA, UNSPECIFIED IRON DEFICIENCY ANEMIA TYPE: ICD-10-CM

## 2024-10-22 DIAGNOSIS — M51.361 DEGENERATION OF INTERVERTEBRAL DISC OF LUMBAR REGION WITH LOWER EXTREMITY PAIN: Primary | ICD-10-CM

## 2024-10-22 DIAGNOSIS — I50.42 CHRONIC COMBINED SYSTOLIC AND DIASTOLIC CHF (CONGESTIVE HEART FAILURE): ICD-10-CM

## 2024-10-22 DIAGNOSIS — I48.20 CHRONIC ATRIAL FIBRILLATION: ICD-10-CM

## 2024-10-22 DIAGNOSIS — E11.69 HYPERLIPIDEMIA ASSOCIATED WITH TYPE 2 DIABETES MELLITUS: ICD-10-CM

## 2024-10-22 DIAGNOSIS — I15.2 HYPERTENSION ASSOCIATED WITH DIABETES: ICD-10-CM

## 2024-10-22 DIAGNOSIS — Z12.31 ENCOUNTER FOR SCREENING MAMMOGRAM FOR MALIGNANT NEOPLASM OF BREAST: ICD-10-CM

## 2024-10-22 DIAGNOSIS — E11.51 TYPE 2 DIABETES MELLITUS WITH DIABETIC PERIPHERAL ANGIOPATHY WITHOUT GANGRENE, WITHOUT LONG-TERM CURRENT USE OF INSULIN: ICD-10-CM

## 2024-10-22 DIAGNOSIS — E11.59 HYPERTENSION ASSOCIATED WITH DIABETES: ICD-10-CM

## 2024-10-22 DIAGNOSIS — E78.5 HYPERLIPIDEMIA ASSOCIATED WITH TYPE 2 DIABETES MELLITUS: ICD-10-CM

## 2024-10-22 DIAGNOSIS — R05.2 SUBACUTE COUGH: Primary | ICD-10-CM

## 2024-10-22 PROCEDURE — 3044F HG A1C LEVEL LT 7.0%: CPT | Mod: CPTII,95,, | Performed by: NURSE PRACTITIONER

## 2024-10-22 PROCEDURE — 1159F MED LIST DOCD IN RCRD: CPT | Mod: CPTII,95,, | Performed by: NURSE PRACTITIONER

## 2024-10-22 PROCEDURE — 1160F RVW MEDS BY RX/DR IN RCRD: CPT | Mod: CPTII,95,, | Performed by: NURSE PRACTITIONER

## 2024-10-22 PROCEDURE — 4010F ACE/ARB THERAPY RXD/TAKEN: CPT | Mod: CPTII,95,, | Performed by: NURSE PRACTITIONER

## 2024-10-22 PROCEDURE — 1157F ADVNC CARE PLAN IN RCRD: CPT | Mod: CPTII,95,, | Performed by: NURSE PRACTITIONER

## 2024-10-22 PROCEDURE — 99214 OFFICE O/P EST MOD 30 MIN: CPT | Mod: 95,,, | Performed by: NURSE PRACTITIONER

## 2024-10-22 PROCEDURE — 3072F LOW RISK FOR RETINOPATHY: CPT | Mod: CPTII,95,, | Performed by: NURSE PRACTITIONER

## 2024-10-22 NOTE — PROGRESS NOTES
Subjective:       Patient ID: Nelly Tian is a 73 y.o. female.    Chief Complaint: No chief complaint on file.    HPI      The patient location is: [place]  The chief complaint leading to consultation is: follow up    Visit type: audiovisual    Face to Face time with patient: 30 minutes of total time spent on the encounter, which includes face to face time and non-face to face time preparing to see the patient (eg, review of tests), Obtaining and/or reviewing separately obtained history, Documenting clinical information in the electronic or other health record, Independently interpreting results (not separately reported) and communicating results to the patient/family/caregiver, or Care coordination (not separately reported).         Each patient to whom he or she provides medical services by telemedicine is:  (1) informed of the relationship between the physician and patient and the respective role of any other health care provider with respect to management of the patient; and (2) notified that he or she may decline to receive medical services by telemedicine and may withdraw from such care at any time.    Notes:    Patient presents today via virtual visit for follow up. Last visit with PCP- on 8/1/24. Labs reviewed  8/24. Patient request 3 months Norco refill.      5/6/24 infectious diseases-: Recurrent cellulitis, Panniculitis, Yeast dermatitis-cefadroxil, fluconazole, home health-PICC line    4/24/24 Hospital stay: admitted due to sepsis and abdominal wall cellulitis- Midline placed on 4/24, and ID advised IV Daptomycin 800 mg daily for 10 days through May 4th then stop   Past Medical History:   Diagnosis Date    *Atrial flutter     Angina pectoris 9/18/2017    Anxiety     Arthritis     Asthma     Atrial fibrillation     Back pain     Cataract     OD    Chest pain 9/17/2017    CHF (congestive heart failure)     Chronically on benzodiazepine therapy 5/4/2019    COPD (chronic obstructive  pulmonary disease)     Depression     Diabetes mellitus     Emphysema of lung     Heart failure     Hepatomegaly 2/3/2016    Hernia     History of MI (myocardial infarction) 1/19/2016    Hypercapnic respiratory failure, chronic 11/16/2016    Hyperlipidemia     Hypertension     Iron deficiency anemia 2/3/2016    Myocardial infarction     Obesity     Peripheral vascular disease     Pneumonia     Polyneuropathy     Retinal detachment     OS    Septic shock 4/23/2017    Skin ulcer 3/18/2017    Tobacco dependence     Type II 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, not stated as uncontrolled(250.60)        Review of patient's allergies indicates:   Allergen Reactions    Dilaudid [hydromorphone] Anaphylaxis     Other reaction(s): Anaphylaxis  Other reaction(s): Unknown    Zyvox [linezolid in dextrose 5%] Shortness Of Breath         Current Outpatient Medications:     albuterol (PROVENTIL/VENTOLIN HFA) 90 mcg/actuation inhaler, Inhale 2 puffs into the lungs every 6 (six) hours as needed for Wheezing (Rescue). Rescue, Disp: 20.1 g, Rfl: 2    albuterol-ipratropium (DUO-NEB) 2.5 mg-0.5 mg/3 mL nebulizer solution, Take 3 mLs by nebulization every 6 (six) hours as needed for Wheezing. Rescue, Disp: 75 mL, Rfl: 0    amoxicillin-clavulanate 875-125mg (AUGMENTIN) 875-125 mg per tablet, Take 1 tablet by mouth every 12 (twelve) hours. for 7 days, Disp: 14 tablet, Rfl: 0    ascorbic acid, vitamin C, (VITAMIN C) 500 MG tablet, Take 2 tablets (1,000 mg total) by mouth every evening., Disp: , Rfl:     atorvastatin (LIPITOR) 10 MG tablet, Take 1 tablet (10 mg total) by mouth every other day., Disp: 45 tablet, Rfl: 0    blood sugar diagnostic Strp, To check BG 2 times daily, to use with insurance preferred meter, Disp: 100 each, Rfl: 5    blood-glucose meter kit, To check BG 2 times daily, to use with insurance preferred meter, Disp: 1 each, Rfl: 1    ELIQUIS 5 mg Tab, TAKE 1 TABLET TWICE DAILY, Disp: 180  tablet, Rfl: 5    empagliflozin (JARDIANCE) 25 mg tablet, Take 1 tablet (25 mg total) by mouth once daily., Disp: 90 tablet, Rfl: 1    fluticasone propionate (FLONASE) 50 mcg/actuation nasal spray, 1 spray (50 mcg total) by Each Nostril route once daily. Shake Liquid and use, Disp: 48 g, Rfl: 3    furosemide (LASIX) 40 MG tablet, Take 1 tablet (40 mg total) by mouth once daily., Disp: 90 tablet, Rfl: 2    gabapentin (NEURONTIN) 800 MG tablet, Take 1 tablet (800 mg total) by mouth 3 (three) times daily., Disp: 90 tablet, Rfl: 11    HYDROcodone-acetaminophen (NORCO) 7.5-325 mg per tablet, 1 tab every 6 hrs x 2 weeks then x 3 a day with meals x 2 weeks, Disp: 100 tablet, Rfl: 0    HYDROcodone-acetaminophen (NORCO) 7.5-325 mg per tablet, Take 1 tablet by mouth every 8 (eight) hours as needed for Pain., Disp: 60 tablet, Rfl: 0    hydrOXYzine HCL (ATARAX) 25 MG tablet, Take 1 tablet (25 mg total) by mouth nightly as needed for Itching., Disp: 30 tablet, Rfl: 0    Lactobacillus acidophilus 1 billion cell Cap, Take 1 capsule by mouth 3 (three) times daily with meals., Disp: 60 capsule, Rfl: 0    lactulose (CHRONULAC) 10 gram/15 mL solution, TAKE 15 MLS ONCE DAILY, Disp: 1350 mL, Rfl: 3    lancets Misc, To check BG 2 times daily, to use with insurance preferred meter, Disp: 100 each, Rfl: 5    LIDOcaine (LIDODERM) 5 %, APPLY PATCH ONTO THE SKIN. REMOVE AND DISCARD PATCH WITHIN 12 HOURS OR AS DIRECTED BY MD, Disp: 30 patch, Rfl: 3    lisinopriL (PRINIVIL,ZESTRIL) 20 MG tablet, Take 1 tablet (20 mg total) by mouth once daily., Disp: 90 tablet, Rfl: 1    melatonin 10 mg Tab, Take 10 mg by mouth nightly., Disp: , Rfl:     metFORMIN (GLUCOPHAGE) 500 MG tablet, TAKE 1 TABLET EVERY DAY WITH BREAKFAST, Disp: 90 tablet, Rfl: 1    multivitamin (THERAGRAN) tablet, Take 1 tablet by mouth once daily., Disp: , Rfl:     nystatin (NYSTOP) powder, APPLY TOPICALLY TO THE AFFECTED AREA TWICE DAILY, Disp: 120 g, Rfl: 11     nystatin-triamcinolone (MYCOLOG II) cream, APPLY TOPICALLY TO THE AFFECTED AREA THREE TIMES DAILY, Disp: 30 g, Rfl: 5    ondansetron (ZOFRAN-ODT) 8 MG TbDL, DISSOLVE 1 TABLET ON THE TONGUE EVERY 12 HOURS AS NEEDED, Disp: 40 tablet, Rfl: 3    potassium chloride SA (K-DUR,KLOR-CON) 20 MEQ tablet, Take 1 tablet (20 mEq total) by mouth once daily., Disp: 90 tablet, Rfl: 2    promethazine-dextromethorphan (PROMETHAZINE-DM) 6.25-15 mg/5 mL Syrp, Take 5 mLs by mouth 4 (four) times daily as needed (cough)., Disp: 118 mL, Rfl: 1    QUEtiapine (SEROQUEL) 25 MG Tab, Take 1 tablet (25 mg total) by mouth nightly., Disp: 30 tablet, Rfl: 3    spironolactone (ALDACTONE) 25 MG tablet, Take 1 tablet (25 mg total) by mouth 3 (three) times a week., Disp: 84 tablet, Rfl: 2    tirzepatide (MOUNJARO) 2.5 mg/0.5 mL PnIj, Inject 2.5 mg into the skin every 7 days., Disp: 12 Pen, Rfl: 1    TRELEGY ELLIPTA 100-62.5-25 mcg DsDv, INHALE 1 PUFF INTO THE LUNGS ONCE DAILY. EXPIRES 42 DAYS AFTER OPENING FOIL TRAY, Disp: 180 each, Rfl: 2    Review of Systems   Constitutional:  Negative for unexpected weight change.   HENT:  Negative for trouble swallowing.    Eyes:  Negative for visual disturbance.   Respiratory:  Positive for cough. Negative for shortness of breath.    Cardiovascular:  Negative for chest pain, palpitations and leg swelling.   Gastrointestinal:  Negative for blood in stool.   Genitourinary:  Negative for hematuria.   Skin:  Negative for rash.   Allergic/Immunologic: Negative for immunocompromised state.   Neurological:  Negative for headaches.   Hematological:  Does not bruise/bleed easily.   Psychiatric/Behavioral:  Negative for agitation. The patient is not nervous/anxious.        Objective:      LMP  (LMP Unknown)   Physical Exam  Vitals reviewed: unable to assess due to virtual visit.         Assessment:       1. Degeneration of intervertebral disc of lumbar region with lower extremity pain    2. Chronic combined systolic and  diastolic CHF (congestive heart failure)    3. Type 2 diabetes mellitus with diabetic peripheral angiopathy without gangrene, without long-term current use of insulin    4. Hypertension associated with diabetes    5. Iron deficiency anemia, unspecified iron deficiency anemia type    6. Hyperlipidemia associated with type 2 diabetes mellitus    7. Encounter for screening mammogram for malignant neoplasm of breast    8. Chronic atrial fibrillation    9. Type 2 diabetes mellitus with diabetic neuropathy, without long-term current use of insulin        Plan:       Degeneration of intervertebral disc of lumbar region with lower extremity pain  Patient request Norco 3 months refill.  checked-no inappropriate activity found  Will send refill request to [facility]   Chronic combined systolic and diastolic CHF (congestive heart failure)  -     B-TYPE NATRIURETIC PEPTIDE; Future; Expected date: 10/22/2024  Stable, on apixaban 5 mg p.o. b.i.d. for thromboembolism prophylaxis in presence of atrial fib   Followed by Cardiology  Type 2 diabetes mellitus with diabetic peripheral angiopathy without gangrene, without long-term current use of insulin  -     Hemoglobin A1C; Future; Expected date: 10/22/2024  Stable, continue management  Follow the ADA diet  Hemoglobin A1C   Date Value Ref Range Status   08/05/2024 5.6 4.5 - 6.2 % Final     Comment:     ADA Screening Guidelines:  5.7-6.4%  Consistent with prediabetes  >or=6.5%  Consistent with diabetes    High levels of fetal hemoglobin interfere with the HbA1C  assay. Heterozygous hemoglobin variants (HbS, HgC, etc)do  not significantly interfere with this assay.   However, presence of multiple variants may affect accuracy.     05/20/2024 5.4 4.5 - 6.2 % Final     Comment:     ADA Screening Guidelines:  5.7-6.4%  Consistent with prediabetes  >or=6.5%  Consistent with diabetes    High levels of fetal hemoglobin interfere with the HbA1C  assay. Heterozygous hemoglobin variants (HbS, HgC,  etc)do  not significantly interfere with this assay.   However, presence of multiple variants may affect accuracy.     02/19/2024 6.0 4.5 - 6.2 % Final     Comment:     According to ADA guidelines, hemoglobin A1C <7.0% represents  optimal control in non-pregnant diabetic patients.  Different  metrics may apply to specific populations.   Standards of Medical Care in Diabetes - 2016.    For the purpose of screening for the presence of diabetes:  <5.7%     Consistent with the absence of diabetes  5.7-6.4%  Consistent with increasing risk for diabetes   (prediabetes)  >or=6.5%  Consistent with diabetes    Currently no consensus exists for use of hemoglobin A1C  for diagnosis of diabetes for children.        Hypertension associated with diabetes  -     COMPREHENSIVE METABOLIC PANEL; Future; Expected date: 10/22/2024  -     CBC W/ AUTO DIFFERENTIAL; Future; Expected date: 10/22/2024  Stable  continue management  Low sodium diet  Iron deficiency anemia, unspecified iron deficiency anemia type  -     Iron and TIBC; Future; Expected date: 10/22/2024  -     FERRITIN; Future; Expected date: 10/22/2024  Stable, continue management  Hyperlipidemia associated with type 2 diabetes mellitus  -     Lipid Panel; Future; Expected date: 10/22/2024  Stable, on Lipitor  Encounter for screening mammogram for malignant neoplasm of breast  -     Mammo Digital Screening Bilat w/ Artemio; Future; Expected date: 10/22/2024    Chronic atrial fibrillation  Stable, on apixaban 5 mg p.o. b.i.d  Type 2 diabetes mellitus with diabetic neuropathy, without long-term current use of insulin  Stable, on gabapentin        Patient readiness: acceptance and barriers:none    During the course of the visit the patient was educated and counseled about the following:     Diabetes:  Discussed general issues about diabetes pathophysiology and management.  Addressed ADA diet.  Encouraged aerobic exercise.  Hypertension:   Dietary sodium restriction.  Regular aerobic  exercise.    Goals: Diabetes: Maintain Hemoglobin A1C below 7 and Hypertension: Reduce Blood Pressure    Did patient meet goals/outcomes: Yes    The following self management tools provided: declined    Patient Instructions (the written plan) was given to the patient/family.     Time spent with patient: 30 minutes    Barriers to medications present (no )    Adverse reactions to current medications (no)    Over the counter medications reviewed (Yes)

## 2024-10-23 ENCOUNTER — DOCUMENT SCAN (OUTPATIENT)
Dept: HOME HEALTH SERVICES | Facility: HOSPITAL | Age: 74
End: 2024-10-23
Payer: MEDICARE

## 2024-10-23 ENCOUNTER — TELEPHONE (OUTPATIENT)
Dept: INFECTIOUS DISEASES | Facility: HOSPITAL | Age: 74
End: 2024-10-23

## 2024-10-29 DIAGNOSIS — L03.311 CELLULITIS OF LEFT ABDOMINAL WALL: ICD-10-CM

## 2024-10-29 DIAGNOSIS — M51.369 DDD (DEGENERATIVE DISC DISEASE), LUMBAR: Chronic | ICD-10-CM

## 2024-10-29 RX ORDER — HYDROCODONE BITARTRATE AND ACETAMINOPHEN 7.5; 325 MG/1; MG/1
1 TABLET ORAL EVERY 12 HOURS PRN
Qty: 60 TABLET | Refills: 0 | Status: CANCELLED | OUTPATIENT
Start: 2024-12-01 | End: 2024-12-31

## 2024-10-30 ENCOUNTER — EXTERNAL HOME HEALTH (OUTPATIENT)
Dept: HOME HEALTH SERVICES | Facility: HOSPITAL | Age: 74
End: 2024-10-30
Payer: MEDICARE

## 2024-10-30 ENCOUNTER — TELEPHONE (OUTPATIENT)
Dept: INFECTIOUS DISEASES | Facility: CLINIC | Age: 74
End: 2024-10-30
Payer: MEDICARE

## 2024-10-30 RX ORDER — HYDROCODONE BITARTRATE AND ACETAMINOPHEN 7.5; 325 MG/1; MG/1
1 TABLET ORAL EVERY 6 HOURS PRN
Qty: 90 TABLET | Refills: 0 | Status: SHIPPED | OUTPATIENT
Start: 2025-01-01 | End: 2025-01-31

## 2024-10-30 RX ORDER — HYDROCODONE BITARTRATE AND ACETAMINOPHEN 7.5; 325 MG/1; MG/1
1 TABLET ORAL EVERY 12 HOURS PRN
Qty: 60 TABLET | Refills: 0 | Status: SHIPPED | OUTPATIENT
Start: 2024-12-01 | End: 2024-12-31

## 2024-10-30 RX ORDER — HYDROCODONE BITARTRATE AND ACETAMINOPHEN 7.5; 325 MG/1; MG/1
1 TABLET ORAL EVERY 12 HOURS PRN
Qty: 60 TABLET | Refills: 0 | Status: SHIPPED | OUTPATIENT
Start: 2024-11-01 | End: 2024-12-01

## 2024-10-31 ENCOUNTER — EXTERNAL HOME HEALTH (OUTPATIENT)
Dept: HOME HEALTH SERVICES | Facility: HOSPITAL | Age: 74
End: 2024-10-31
Payer: MEDICARE

## 2024-10-31 NOTE — TELEPHONE ENCOUNTER
I spoke with  Thelma ( [facility] ) [phone]  To advise per Dr Juan's orders :   Please DO NOT send  Home health recertifications to me!   J [facility]elin [facility]  10/31/24

## 2024-11-03 NOTE — TELEPHONE ENCOUNTER
----- Message from Royce Foss sent at 8/31/2017 12:28 PM CDT -----  Contact: pt   Pt is leaving town to go by her sisters on Saturday and stay there for a couple of weeks and is requesting her prescription for Norco(call ehn ready for   Call Back#[phone]  Thanks     Nori with [employer] speaking with the patient on the phone att.

## 2024-11-06 ENCOUNTER — EXTERNAL HOME HEALTH (OUTPATIENT)
Dept: HOME HEALTH SERVICES | Facility: HOSPITAL | Age: 74
End: 2024-11-06
Payer: MEDICARE

## 2024-11-08 ENCOUNTER — PATIENT MESSAGE (OUTPATIENT)
Dept: PRIMARY CARE CLINIC | Facility: CLINIC | Age: 74
End: 2024-11-08
Payer: MEDICARE

## 2024-11-08 DIAGNOSIS — M51.369 DDD (DEGENERATIVE DISC DISEASE), LUMBAR: Chronic | ICD-10-CM

## 2024-11-11 DIAGNOSIS — E11.51 TYPE 2 DIABETES MELLITUS WITH DIABETIC PERIPHERAL ANGIOPATHY WITHOUT GANGRENE, WITHOUT LONG-TERM CURRENT USE OF INSULIN: ICD-10-CM

## 2024-11-11 DIAGNOSIS — E11.59 HYPERTENSION ASSOCIATED WITH DIABETES: ICD-10-CM

## 2024-11-11 DIAGNOSIS — Z51.81 THERAPEUTIC DRUG MONITORING: ICD-10-CM

## 2024-11-11 DIAGNOSIS — I50.30 DIASTOLIC CHF WITH PRESERVED LEFT VENTRICULAR FUNCTION, NYHA CLASS 2: ICD-10-CM

## 2024-11-11 DIAGNOSIS — I50.22 CHRONIC SYSTOLIC CONGESTIVE HEART FAILURE: ICD-10-CM

## 2024-11-11 DIAGNOSIS — I15.2 HYPERTENSION ASSOCIATED WITH DIABETES: ICD-10-CM

## 2024-11-11 RX ORDER — SPIRONOLACTONE 25 MG/1
25 TABLET ORAL
Qty: 36 TABLET | Refills: 1 | Status: SHIPPED | OUTPATIENT
Start: 2024-11-11

## 2024-11-11 RX ORDER — LACTULOSE 10 G/15ML
15 SOLUTION ORAL; RECTAL DAILY
Qty: 1350 ML | Refills: 3 | Status: SHIPPED | OUTPATIENT
Start: 2024-11-11 | End: 2024-11-14

## 2024-11-11 RX ORDER — POTASSIUM CHLORIDE 20 MEQ/1
20 TABLET, EXTENDED RELEASE ORAL DAILY
Qty: 90 TABLET | Refills: 1 | Status: SHIPPED | OUTPATIENT
Start: 2024-11-11

## 2024-11-11 RX ORDER — FUROSEMIDE 40 MG/1
40 TABLET ORAL DAILY
Qty: 90 TABLET | Refills: 1 | Status: SHIPPED | OUTPATIENT
Start: 2024-11-11

## 2024-11-11 RX ORDER — METFORMIN HYDROCHLORIDE 500 MG/1
500 TABLET ORAL
Qty: 90 TABLET | Refills: 0 | Status: SHIPPED | OUTPATIENT
Start: 2024-11-11

## 2024-11-11 RX ORDER — LISINOPRIL 20 MG/1
20 TABLET ORAL DAILY
Qty: 90 TABLET | Refills: 1 | Status: SHIPPED | OUTPATIENT
Start: 2024-11-11

## 2024-11-11 RX ORDER — ATORVASTATIN CALCIUM 10 MG/1
10 TABLET, FILM COATED ORAL EVERY OTHER DAY
Qty: 45 TABLET | Refills: 0 | Status: SHIPPED | OUTPATIENT
Start: 2024-11-11

## 2024-11-11 NOTE — TELEPHONE ENCOUNTER
Care Due:                  Date            Visit Type   Department     Provider  --------------------------------------------------------------------------------                                ESTABLISHED                              PATIENT -  Last Visit: 08-      VIRTUAL      None Found     Constantine Bird  Next Visit: None Scheduled  None         None Found                                                            Last  Test          Frequency    Reason                     Performed    Due Date  --------------------------------------------------------------------------------    HBA1C.......  6 months...  empagliflozin, metFORMIN,   08- 02-                             tirzepatide..............    Lipid Panel.  12 months..  atorvastatin.............  03- 03-    [facility] Embedded Care Due Messages. Reference number: [id number].   11/11/2024 2:05:55 PM CST

## 2024-11-11 NOTE — TELEPHONE ENCOUNTER
Refill Routing Note   Medication(s) are not appropriate for processing by [facility] for the following reason(s):        Required labs outdated  Outside of protocol  Drug-drug interaction: furosemide, spironolactone  Drug-disease interaction: potassium chloride SA/Spironolactone/Lisinopril and Hyperkalemia: metFORMIN and Hepatomegaly: empagliflozin and PVD (peripheral vascular disease); Hypertension associated with diabetes; Type 2 diabetes mellitus with diabetic peripheral angiopathy without gangrene, without long-term current use of insulin    [facility] action(s):  Defer  Route     Requires labs : Yes           Pharmacist review requested: Yes     Appointments  past 12m or future 3m with PCP    Date Provider   Last Visit   8/1/2024 Constantine Bird MD   Next Visit   Visit date not found Constantine Bird MD   ED visits in past 90 days: 0        Note composed:2:17 PM 11/11/2024

## 2024-11-11 NOTE — TELEPHONE ENCOUNTER
Refill Routing Note   Medication(s) are not appropriate for processing by [facility] for the following reason(s):        Required labs outdated  Outside of protocol    [facility] action(s):  Defer  Route   Requires labs : Yes           Pharmacist review requested: Yes   Extended chart review required: Yes     Appointments  past 12m or future 3m with PCP    Date Provider   Last Visit   8/1/2024 Constantine Bird MD   Next Visit   Visit date not found Constantine Bird MD   ED visits in past 90 days: 0        Note composed:2:34 PM 11/11/2024

## 2024-11-24 ENCOUNTER — HOSPITAL ENCOUNTER (EMERGENCY)
Facility: HOSPITAL | Age: 74
Discharge: HOME | End: 2024-11-24
Attending: EMERGENCY MEDICINE
Payer: MEDICARE

## 2024-11-24 VITALS
DIASTOLIC BLOOD PRESSURE: 56 MMHG | HEIGHT: 68 IN | HEART RATE: 76 BPM | SYSTOLIC BLOOD PRESSURE: 123 MMHG | BODY MASS INDEX: 39.4 KG/M2 | OXYGEN SATURATION: 96 % | RESPIRATION RATE: 16 BRPM | WEIGHT: 260 LBS | TEMPERATURE: 98 F

## 2024-11-24 DIAGNOSIS — M19.011 DEGENERATIVE JOINT DISEASE OF RIGHT ACROMIOCLAVICULAR JOINT: Primary | ICD-10-CM

## 2024-11-24 DIAGNOSIS — M25.511 RIGHT SHOULDER PAIN: ICD-10-CM

## 2024-11-24 DIAGNOSIS — Z51.89 VISIT FOR WOUND CHECK: ICD-10-CM

## 2024-11-24 LAB
ALBUMIN SERPL BCP-MCNC: 3.2 G/DL (ref 3.5–5.2)
ALP SERPL-CCNC: 68 U/L (ref 40–150)
ALT SERPL W/O P-5'-P-CCNC: 14 U/L (ref 10–44)
ANION GAP SERPL CALC-SCNC: 11 MMOL/L (ref 8–16)
AST SERPL-CCNC: 17 U/L (ref 10–40)
BASOPHILS # BLD AUTO: 0.07 K/UL (ref 0–0.2)
BASOPHILS NFR BLD: 0.5 % (ref 0–1.9)
BILIRUB SERPL-MCNC: 0.4 MG/DL (ref 0.1–1)
BUN SERPL-MCNC: 19 MG/DL (ref 8–23)
CALCIUM SERPL-MCNC: 9.7 MG/DL (ref 8.7–10.5)
CHLORIDE SERPL-SCNC: 94 MMOL/L (ref 95–110)
CO2 SERPL-SCNC: 27 MMOL/L (ref 23–29)
CREAT SERPL-MCNC: 0.7 MG/DL (ref 0.5–1.4)
DIFFERENTIAL METHOD BLD: ABNORMAL
EOSINOPHIL # BLD AUTO: 0.3 K/UL (ref 0–0.5)
EOSINOPHIL NFR BLD: 2.2 % (ref 0–8)
ERYTHROCYTE [DISTWIDTH] IN BLOOD BY AUTOMATED COUNT: 16.1 % (ref 11.5–14.5)
EST. GFR  (NO RACE VARIABLE): >60 ML/MIN/1.73 M^2
GLUCOSE SERPL-MCNC: 102 MG/DL (ref 70–110)
HCT VFR BLD AUTO: 39.1 % (ref 37–48.5)
HGB BLD-MCNC: 12 G/DL (ref 12–16)
IMM GRANULOCYTES # BLD AUTO: 0.03 K/UL (ref 0–0.04)
IMM GRANULOCYTES NFR BLD AUTO: 0.2 % (ref 0–0.5)
LYMPHOCYTES # BLD AUTO: 1.1 K/UL (ref 1–4.8)
LYMPHOCYTES NFR BLD: 8.7 % (ref 18–48)
MCH RBC QN AUTO: 27.6 PG (ref 27–31)
MCHC RBC AUTO-ENTMCNC: 30.7 G/DL (ref 32–36)
MCV RBC AUTO: 90 FL (ref 82–98)
MONOCYTES # BLD AUTO: 1.6 K/UL (ref 0.3–1)
MONOCYTES NFR BLD: 12.7 % (ref 4–15)
NEUTROPHILS # BLD AUTO: 9.6 K/UL (ref 1.8–7.7)
NEUTROPHILS NFR BLD: 75.7 % (ref 38–73)
NRBC BLD-RTO: 0 /100 WBC
PLATELET # BLD AUTO: 212 K/UL (ref 150–450)
PMV BLD AUTO: 10 FL (ref 9.2–12.9)
POTASSIUM SERPL-SCNC: 4.8 MMOL/L (ref 3.5–5.1)
PROT SERPL-MCNC: 7 G/DL (ref 6–8.4)
RBC # BLD AUTO: 4.35 M/UL (ref 4–5.4)
SODIUM SERPL-SCNC: 132 MMOL/L (ref 136–145)
WBC # BLD AUTO: 12.73 K/UL (ref 3.9–12.7)

## 2024-11-24 PROCEDURE — 85025 COMPLETE CBC W/AUTO DIFF WBC: CPT | Performed by: EMERGENCY MEDICINE

## 2024-11-24 PROCEDURE — 80053 COMPREHEN METABOLIC PANEL: CPT | Performed by: EMERGENCY MEDICINE

## 2024-11-24 PROCEDURE — 99284 EMERGENCY DEPT VISIT MOD MDM: CPT | Mod: 25

## 2024-11-24 PROCEDURE — 36415 COLL VENOUS BLD VENIPUNCTURE: CPT | Performed by: EMERGENCY MEDICINE

## 2024-11-25 NOTE — ED PROVIDER NOTES
Encounter Date: 11/24/2024       History     Chief Complaint   Patient presents with    Wound Infection     Pt has a wound on the left side of her abdomen that wound care has been treating for the last two months at home. Pt states she is worried she has sepsis. That the wound isnt getting better. Also complaining of right shoulder pain     Patient is a 74-year-old woman who  has a past medical history of *Atrial flutter, Angina pectoris, Anxiety, Arthritis, Asthma, Atrial fibrillation, Back pain, Cataract, Chest pain, CHF (congestive heart failure), Chronically on benzodiazepine therapy, COPD (chronic obstructive pulmonary disease), Depression, Diabetes mellitus, Emphysema of lung, Heart failure, Hepatomegaly, Hernia, History of MI (myocardial infarction), Hypercapnic respiratory failure, chronic, Hyperlipidemia, Hypertension, Iron deficiency anemia, Myocardial infarction, Obesity, Peripheral vascular disease, Pneumonia, Polyneuropathy, Retinal detachment, Septic shock, Skin ulcer, Tobacco dependence, and Type II 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, not stated as uncontrolled(250.60).  She presents emergency department for shaking.  She is concerned she is becoming septic because she had developed shaking with previous episodes of sepsis.  She states she has a wound on her left lateral abdomen that has been there for 2 months undergoing wound care with significant improvement.  Denies any associated fever.  She does also complain of right shoulder pain but states she uses that right side much more than the left.      Review of patient's allergies indicates:   Allergen Reactions    Dilaudid [hydromorphone] Anaphylaxis     Other reaction(s): Anaphylaxis  Other reaction(s): Unknown    Zyvox [linezolid in dextrose 5%] Shortness Of Breath     Past Medical History:   Diagnosis Date    *Atrial flutter     Angina pectoris 9/18/2017    Anxiety     Arthritis     Asthma     Atrial  fibrillation     Back pain     Cataract     OD    Chest pain 2017    CHF (congestive heart failure)     Chronically on benzodiazepine therapy 2019    COPD (chronic obstructive pulmonary disease)     Depression     Diabetes mellitus     Emphysema of lung     Heart failure     Hepatomegaly 2/3/2016    Hernia     History of MI (myocardial infarction) 2016    Hypercapnic respiratory failure, chronic 2016    Hyperlipidemia     Hypertension     Iron deficiency anemia 2/3/2016    Myocardial infarction     Obesity     Peripheral vascular disease     Pneumonia     Polyneuropathy     Retinal detachment     OS    Septic shock 2017    Skin ulcer 3/18/2017    Tobacco dependence     Type II 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, not stated as uncontrolled(250.60)      Past Surgical History:   Procedure Laterality Date    BREAST BIOPSY Left 10 yrs ago    benign    CATARACT EXTRACTION Left     OS     CATARACT EXTRACTION W/  INTRAOCULAR LENS IMPLANT Right 2023    Procedure: CEIOL OD;  Surgeon: David Bautista MD;  Location: [facility];  Service: Ophthalmology;  Laterality: Right;     SECTION      x2    EYE SURGERY      HYSTERECTOMY      partial    OOPHORECTOMY      one ovary conserved    RETINAL DETACHMENT SURGERY      buckle --OS    TONSILLECTOMY       Family History   Problem Relation Name Age of Onset    Alcohol abuse Mother      Cirrhosis Mother      Arthritis Father      Heart disease Father      Heart attack Father      Arrhythmia Father      Coronary artery disease Father      Coronary artery disease Sister      Early death Sister  30        heart disease    Heart disease Sister  32        MI    Heart attack Sister      Arthritis Sister      Heart disease Sister      Crohn's disease Sister      Cancer Brother          Lung cancer    Lung cancer Brother      No Known Problems Brother      No Known Problems Daughter x1     Blindness Son x1         due to  accident//    Breast cancer Neg Hx      Glaucoma Neg Hx      Macular degeneration Neg Hx      Retinal detachment Neg Hx      Amblyopia Neg Hx      Diabetes Neg Hx      Cataracts Neg Hx      Hypertension Neg Hx      Strabismus Neg Hx      Stroke Neg Hx      Thyroid disease Neg Hx       Social History     Tobacco Use    Smoking status: Former     Current packs/day: 0.00     Average packs/day: 0.3 packs/day for 54.4 years (16.3 ttl pk-yrs)     Types: Cigarettes     Start date: 1968     Quit date: 2022     Years since quittin.4    Smokeless tobacco: Never   Substance Use Topics    Alcohol use: No     Alcohol/week: 0.0 standard drinks of alcohol    Drug use: No     Review of Systems   Constitutional:  Negative for fever.   Musculoskeletal:  Positive for arthralgias.   Skin:  Positive for wound.       Physical Exam     Initial Vitals [24 1748]   BP Pulse Resp Temp SpO2   (!) 147/120 78 16 98 °F (36.7 °C) 97 %      MAP       --         Physical Exam    Constitutional: Vital signs are normal. She appears well-developed and well-nourished.  Non-toxic appearance. No distress.   Morbid obesity   HENT:   Head: Normocephalic and atraumatic.   Eyes: EOM are normal. Pupils are equal, round, and reactive to light.   Neck: Neck supple. No JVD present.   Normal range of motion.  Cardiovascular:  Normal rate, regular rhythm, normal heart sounds and intact distal pulses.     Exam reveals no gallop and no friction rub.       No murmur heard.  Pulmonary/Chest: Breath sounds normal. She has no wheezes. She has no rhonchi. She has no rales.   Abdominal: Abdomen is soft. Bowel sounds are normal. There is no abdominal tenderness.   Large pannus with healing wound in left lateral abdomen.  No surrounding warmth, minimal marginal erythema.  No active signs of infection. There is no rebound and no guarding.   Musculoskeletal:         General: Tenderness (right shoulder over AC joint with palpable crepitus the right shoulder  joint with range of motion.) present. Normal range of motion.      Cervical back: Normal range of motion and neck supple. No rigidity.     Neurological: She is alert and oriented to person, place, and time. She has normal strength and normal reflexes. No cranial nerve deficit or sensory deficit. She exhibits normal muscle tone. Coordination normal. GCS eye subscore is 4. GCS verbal subscore is 5. GCS motor subscore is 6.   Skin: Skin is warm and dry.   Psychiatric: She has a normal mood and affect. Her speech is normal and behavior is normal. She is not actively hallucinating.         ED Course   Procedures  Labs Reviewed   CBC W/ AUTO DIFFERENTIAL - Abnormal       Result Value    WBC 12.73 (*)     RBC 4.35      Hemoglobin 12.0      Hematocrit 39.1      MCV 90      MCH 27.6      MCHC 30.7 (*)     RDW 16.1 (*)     Platelets 212      MPV 10.0      Immature Granulocytes 0.2      Gran # (ANC) 9.6 (*)     Immature Grans (Abs) 0.03      Lymph # 1.1      Mono # 1.6 (*)     Eos # 0.3      Baso # 0.07      nRBC 0      Gran % 75.7 (*)     Lymph % 8.7 (*)     Mono % 12.7      Eosinophil % 2.2      Basophil % 0.5      Differential Method Automated     COMPREHENSIVE METABOLIC PANEL - Abnormal    Sodium 132 (*)     Potassium 4.8      Chloride 94 (*)     CO2 27      Glucose 102      BUN 19      Creatinine 0.7      Calcium 9.7      Total Protein 7.0      Albumin 3.2 (*)     Total Bilirubin 0.4      Alkaline Phosphatase 68      AST 17      ALT 14      eGFR >60      Anion Gap 11            Imaging Results              X-Ray Shoulder Complete 2 View Right (Final result)  Result time 11/24/24 19:26:59      Final result by Ivis Penn MD (11/24/24 19:26:59)                   Impression:      No acute abnormality.      Electronically signed by: Ivis Penn  Date:    11/24/2024  Time:    19:26               Narrative:    EXAMINATION:  XR SHOULDER COMPLETE 2 OR MORE VIEWS RIGHT    CLINICAL HISTORY:  Pain in right  shoulder    TECHNIQUE:  Two or three views of the right shoulder were performed.    COMPARISON:  None    FINDINGS:  There is no acute fracture or dislocation.  Acromioclavicular joint degenerative changes are noted.  Visualized right ribs and right lung are unremarkable.                                       Medications - No data to display  Medical Decision Making  74-year-old woman presents emergency department for evaluation of her wound and some episodes of shaking.  She is concerned she is septic because she has experienced shaking before with this.  Denies any fever.  States wound has been healing with wound care in his looking good.  I examined the wound and does not show any signs of infection.  She has no fever, no tachycardia, no respiratory distress or hypoxia, she does have elevated white blood cell count that is mild at 12.73 with 75% granulocytes.  She does not meet criteria for sepsis.  I do not think she needs admission for IV antibiotics.  She is already on oral outpatient antibiotics.  She has chronic hyponatremia, sodium is 132 today in his around its baseline.  Chloride is 94.  Potassium is 4.8.  No signs of severe electrolyte derangements.  She has tenderness over the AC joint of the right shoulder and has x-ray with AC joint degenerative changes per my interpretation.  No evidence of fracture dislocation.  Believe patient is stable for outpatient follow-up.  I did inform her that it is always possible she could develop sepsis in the future given her history and that it may just be very early.  She is given strict return precautions should she began to develop fever, tachycardia or worsening symptoms.  Discharged in no acute distress.    Amount and/or Complexity of Data Reviewed  Labs: ordered.  Radiology: ordered.                                      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

## 2024-12-19 DIAGNOSIS — E11.59 HYPERTENSION ASSOCIATED WITH DIABETES: ICD-10-CM

## 2024-12-19 DIAGNOSIS — I15.2 HYPERTENSION ASSOCIATED WITH DIABETES: ICD-10-CM

## 2024-12-19 NOTE — TELEPHONE ENCOUNTER
No care due was identified.  [facility] Embedded Care Due Messages. Reference number: [id number].   12/19/2024 4:47:31 PM CST

## 2024-12-20 RX ORDER — ATORVASTATIN CALCIUM 10 MG/1
10 TABLET, FILM COATED ORAL EVERY OTHER DAY
Qty: 45 TABLET | Refills: 0 | Status: SHIPPED | OUTPATIENT
Start: 2024-12-20

## 2024-12-20 NOTE — TELEPHONE ENCOUNTER
Refill Routing Note   Medication(s) are not appropriate for processing by [facility] for the following reason(s):        Required labs outdated  ED/Hospital Visit since last OV with provider    [facility] action(s):  Defer        Medication Therapy Plan: ED for joint disease      Appointments  past 12m or future 3m with PCP    Date Provider   Last Visit   8/1/2024 Constantine Bird MD   Next Visit   Visit date not found Constantine Bird MD   ED visits in past 90 days: 1        Note composed:8:43 AM 12/20/2024

## 2025-01-07 DIAGNOSIS — B37.2 YEAST DERMATITIS: ICD-10-CM

## 2025-01-07 RX ORDER — NYSTATIN 100000 [USP'U]/G
POWDER TOPICAL
Qty: 120 G | Refills: 11 | Status: SHIPPED | OUTPATIENT
Start: 2025-01-07 | End: 2025-01-08 | Stop reason: SDUPTHER

## 2025-01-08 ENCOUNTER — EXTERNAL HOME HEALTH (OUTPATIENT)
Dept: HOME HEALTH SERVICES | Facility: HOSPITAL | Age: 75
End: 2025-01-08
Payer: MEDICARE

## 2025-01-08 DIAGNOSIS — B37.2 YEAST DERMATITIS: ICD-10-CM

## 2025-01-08 DIAGNOSIS — E11.59 HYPERTENSION ASSOCIATED WITH DIABETES: ICD-10-CM

## 2025-01-08 DIAGNOSIS — I15.2 HYPERTENSION ASSOCIATED WITH DIABETES: ICD-10-CM

## 2025-01-08 DIAGNOSIS — I50.22 CHRONIC SYSTOLIC CONGESTIVE HEART FAILURE: ICD-10-CM

## 2025-01-08 DIAGNOSIS — I50.30 DIASTOLIC CHF WITH PRESERVED LEFT VENTRICULAR FUNCTION, NYHA CLASS 2: ICD-10-CM

## 2025-01-08 DIAGNOSIS — M51.369 DDD (DEGENERATIVE DISC DISEASE), LUMBAR: Chronic | ICD-10-CM

## 2025-01-08 DIAGNOSIS — L30.0: ICD-10-CM

## 2025-01-08 RX ORDER — POTASSIUM CHLORIDE 20 MEQ/1
20 TABLET, EXTENDED RELEASE ORAL DAILY
Qty: 90 TABLET | Refills: 2 | OUTPATIENT
Start: 2025-01-08

## 2025-01-08 RX ORDER — HYDROXYZINE HYDROCHLORIDE 25 MG/1
25 TABLET, FILM COATED ORAL NIGHTLY PRN
Qty: 30 TABLET | Refills: 11 | OUTPATIENT
Start: 2025-01-08

## 2025-01-08 RX ORDER — HYDROCODONE BITARTRATE AND ACETAMINOPHEN 7.5; 325 MG/1; MG/1
1 TABLET ORAL EVERY 6 HOURS PRN
Qty: 45 TABLET | Refills: 0 | Status: SHIPPED | OUTPATIENT
Start: 2025-01-08

## 2025-01-08 RX ORDER — ATORVASTATIN CALCIUM 10 MG/1
10 TABLET, FILM COATED ORAL EVERY OTHER DAY
Qty: 45 TABLET | Refills: 0 | Status: SHIPPED | OUTPATIENT
Start: 2025-01-08

## 2025-01-08 RX ORDER — SPIRONOLACTONE 25 MG/1
25 TABLET ORAL
Qty: 36 TABLET | Refills: 0 | Status: SHIPPED | OUTPATIENT
Start: 2025-01-08

## 2025-01-08 RX ORDER — NYSTATIN AND TRIAMCINOLONE ACETONIDE 100000; 1 [USP'U]/G; MG/G
CREAM TOPICAL 3 TIMES DAILY
Qty: 30 G | Refills: 3 | Status: SHIPPED | OUTPATIENT
Start: 2025-01-08

## 2025-01-08 RX ORDER — LISINOPRIL 20 MG/1
20 TABLET ORAL DAILY
Qty: 90 TABLET | Refills: 2 | Status: SHIPPED | OUTPATIENT
Start: 2025-01-08

## 2025-01-08 RX ORDER — NYSTATIN 100000 [USP'U]/G
POWDER TOPICAL
Qty: 120 G | Refills: 11 | Status: SHIPPED | OUTPATIENT
Start: 2025-01-08

## 2025-01-08 RX ORDER — ALBUTEROL SULFATE 90 UG/1
2 INHALANT RESPIRATORY (INHALATION) EVERY 6 HOURS PRN
Qty: 20.1 G | Refills: 2 | OUTPATIENT
Start: 2025-01-08

## 2025-01-08 NOTE — TELEPHONE ENCOUNTER
Pt requesting medications to be sent to [facility]. Sts alex is not longer with her insurance. Pended for review

## 2025-01-08 NOTE — TELEPHONE ENCOUNTER
No care due was identified.  [facility] Embedded Care Due Messages. Reference number: [id number].   1/08/2025 4:54:29 AM CST

## 2025-01-08 NOTE — TELEPHONE ENCOUNTER
----- Message from Dahlia sent at 1/8/2025  2:24 PM CST -----  Regarding: Needs return call  Type: Needs Medical Advice  Who Called:  Pt    Best Call Back Number: [phone]    Additional Information: Pt needs a return call, [facility] isnt taking her prescriptions rn she is wanting tohave the office call her refills sent to       [facility] [place] [facility]

## 2025-01-09 ENCOUNTER — PATIENT MESSAGE (OUTPATIENT)
Dept: PRIMARY CARE CLINIC | Facility: CLINIC | Age: 75
End: 2025-01-09
Payer: MEDICARE

## 2025-01-09 ENCOUNTER — TELEPHONE (OUTPATIENT)
Dept: PRIMARY CARE CLINIC | Facility: CLINIC | Age: 75
End: 2025-01-09
Payer: MEDICARE

## 2025-01-09 NOTE — TELEPHONE ENCOUNTER
I spoke with pt and pts daughter via phone. I advised them both that [facility] They state that they would like to keep as is for now. She states that she will call back at a later date if they decide to switch pharmacy's.

## 2025-01-09 NOTE — TELEPHONE ENCOUNTER
----- Message from Zan sent at 1/9/2025 11:03 AM CST -----  Contact: Pt  .Type:  Needs Medical Advice    Who Called: pt    Pharmacy name and phone #:  [facility] - [place] - [address] [address]   Phone: [phone]  Fax: [phone]  Would the patient rather a call back or a response via [facility]?  Call back  Best Call Back Number: [phone]   Additional Information: Pt. Is requesting a refill on,  I just need all my medicine sent over to [facility] pharmacy.  My lactulose and pain pills

## 2025-01-14 ENCOUNTER — TELEPHONE (OUTPATIENT)
Dept: PRIMARY CARE CLINIC | Facility: CLINIC | Age: 75
End: 2025-01-14
Payer: MEDICARE

## 2025-01-14 DIAGNOSIS — K59.03 CONSTIPATION DUE TO OPIOID THERAPY: ICD-10-CM

## 2025-01-14 DIAGNOSIS — T40.2X5A CONSTIPATION DUE TO OPIOID THERAPY: ICD-10-CM

## 2025-01-14 RX ORDER — LACTULOSE 10 G/15ML
20 SOLUTION ORAL 2 TIMES DAILY
Qty: 1800 ML | Refills: 2 | Status: SHIPPED | OUTPATIENT
Start: 2025-01-14 | End: 2025-04-14

## 2025-01-14 NOTE — TELEPHONE ENCOUNTER
I spoke with pt via phone. I advised her that the medication has been sent to her preferred pharmacy. No further questions at this time.     ----- Message from Apoorva sent at 1/14/2025  4:47 PM CST -----  Type:  RX Refill Request    Who Called:  Nelly  Refill or New Rx:  refill  RX Name and Strength:  lactulose (CHRONULAC) 20 gram/30 mL Soln 1800 mL Sig - Route: Take 30 mLs (20 g total) by mouth 2 (two) times daily. - Oral   Sent to pharmacy as: lactulose (CHRONULAC) 20 gram/30 mL Soln       How is the patient currently taking it? (ex. 1XDay):  see above  Is this a 30 day or 90 day RX:  see above  Preferred Pharmacy with phone number:      [id number] [address] [address]  Phone: [phone] Fax: [phone]    Local or Mail Order:  local  Ordering Provider:    Best Call Back Number:  [phone]  Additional Information:  patient stated please take [facility] pharmacy off of her profile, that pharamcy is no longer accepting her prescriptions because of insurance change, previous request for medication was sent to the wrong location

## 2025-01-27 DIAGNOSIS — R11.11 NON-INTRACTABLE VOMITING WITHOUT NAUSEA: ICD-10-CM

## 2025-01-27 DIAGNOSIS — K31.84 GASTROPARESIS DIABETICORUM: ICD-10-CM

## 2025-01-27 DIAGNOSIS — E11.43 GASTROPARESIS DIABETICORUM: ICD-10-CM

## 2025-01-27 RX ORDER — ONDANSETRON 8 MG/1
8 TABLET, ORALLY DISINTEGRATING ORAL EVERY 12 HOURS PRN
Qty: 40 TABLET | Refills: 3 | Status: SHIPPED | OUTPATIENT
Start: 2025-01-27 | End: 2025-01-28 | Stop reason: SDUPTHER

## 2025-01-27 NOTE — TELEPHONE ENCOUNTER
----- Message from Maye Kevin sent at 2/8/2018 12:16 PM CST -----  Contact: Trudi with [employer] is calling because she has not received anything regarding why the doctor is requesting oxygen from them because [facility] is paying another vendor for oxygen for the patient.  Call Back#[phone]  Thanks    [id number]

## 2025-01-28 DIAGNOSIS — E11.59 HYPERTENSION ASSOCIATED WITH DIABETES: ICD-10-CM

## 2025-01-28 DIAGNOSIS — J96.11 CHRONIC RESPIRATORY FAILURE WITH HYPOXIA: ICD-10-CM

## 2025-01-28 DIAGNOSIS — G47.00 INSOMNIA, UNSPECIFIED TYPE: ICD-10-CM

## 2025-01-28 DIAGNOSIS — K31.84 GASTROPARESIS DIABETICORUM: ICD-10-CM

## 2025-01-28 DIAGNOSIS — Z51.81 THERAPEUTIC DRUG MONITORING: ICD-10-CM

## 2025-01-28 DIAGNOSIS — L30.0: ICD-10-CM

## 2025-01-28 DIAGNOSIS — M51.369 LUMBAR DEGENERATIVE DISC DISEASE: ICD-10-CM

## 2025-01-28 DIAGNOSIS — R11.11 NON-INTRACTABLE VOMITING WITHOUT NAUSEA: ICD-10-CM

## 2025-01-28 DIAGNOSIS — B37.2 YEAST DERMATITIS: ICD-10-CM

## 2025-01-28 DIAGNOSIS — E11.51 TYPE 2 DIABETES MELLITUS WITH DIABETIC PERIPHERAL ANGIOPATHY WITHOUT GANGRENE, WITHOUT LONG-TERM CURRENT USE OF INSULIN: ICD-10-CM

## 2025-01-28 DIAGNOSIS — M51.369 DDD (DEGENERATIVE DISC DISEASE), LUMBAR: Chronic | ICD-10-CM

## 2025-01-28 DIAGNOSIS — J44.9 CHRONIC OBSTRUCTIVE PULMONARY DISEASE, UNSPECIFIED COPD TYPE: ICD-10-CM

## 2025-01-28 DIAGNOSIS — K59.03 CONSTIPATION DUE TO OPIOID THERAPY: ICD-10-CM

## 2025-01-28 DIAGNOSIS — I10 HYPERTENSION, UNSPECIFIED TYPE: ICD-10-CM

## 2025-01-28 DIAGNOSIS — I15.2 HYPERTENSION ASSOCIATED WITH DIABETES: ICD-10-CM

## 2025-01-28 DIAGNOSIS — T40.2X5A CONSTIPATION DUE TO OPIOID THERAPY: ICD-10-CM

## 2025-01-28 DIAGNOSIS — E11.43 GASTROPARESIS DIABETICORUM: ICD-10-CM

## 2025-01-28 DIAGNOSIS — I50.22 CHRONIC SYSTOLIC CONGESTIVE HEART FAILURE: ICD-10-CM

## 2025-01-28 DIAGNOSIS — R09.81 CONGESTION OF NASAL SINUS: ICD-10-CM

## 2025-01-28 DIAGNOSIS — I50.30 DIASTOLIC CHF WITH PRESERVED LEFT VENTRICULAR FUNCTION, NYHA CLASS 2: ICD-10-CM

## 2025-01-28 RX ORDER — LISINOPRIL 20 MG/1
20 TABLET ORAL DAILY
Qty: 90 TABLET | Refills: 2 | Status: SHIPPED | OUTPATIENT
Start: 2025-01-28

## 2025-01-28 RX ORDER — TIRZEPATIDE 2.5 MG/.5ML
2.5 INJECTION, SOLUTION SUBCUTANEOUS
Qty: 12 PEN | Refills: 1 | Status: SHIPPED | OUTPATIENT
Start: 2025-01-28

## 2025-01-28 RX ORDER — NYSTATIN AND TRIAMCINOLONE ACETONIDE 100000; 1 [USP'U]/G; MG/G
CREAM TOPICAL 3 TIMES DAILY
Qty: 30 G | Refills: 3 | Status: SHIPPED | OUTPATIENT
Start: 2025-01-28

## 2025-01-28 RX ORDER — HYDROXYZINE HYDROCHLORIDE 25 MG/1
25 TABLET, FILM COATED ORAL NIGHTLY PRN
Qty: 30 TABLET | Refills: 11 | Status: SHIPPED | OUTPATIENT
Start: 2025-01-28

## 2025-01-28 RX ORDER — POTASSIUM CHLORIDE 20 MEQ/1
20 TABLET, EXTENDED RELEASE ORAL DAILY
Qty: 90 TABLET | Refills: 2 | Status: SHIPPED | OUTPATIENT
Start: 2025-01-28

## 2025-01-28 RX ORDER — METFORMIN HYDROCHLORIDE 500 MG/1
500 TABLET ORAL
Qty: 90 TABLET | Refills: 0 | Status: SHIPPED | OUTPATIENT
Start: 2025-01-28

## 2025-01-28 RX ORDER — HYDROCODONE BITARTRATE AND ACETAMINOPHEN 7.5; 325 MG/1; MG/1
1 TABLET ORAL EVERY 6 HOURS PRN
Qty: 45 TABLET | Refills: 0 | Status: CANCELLED | OUTPATIENT
Start: 2025-01-28

## 2025-01-28 RX ORDER — FLUTICASONE PROPIONATE 50 MCG
1 SPRAY, SUSPENSION (ML) NASAL DAILY
Qty: 48 G | Refills: 3 | Status: SHIPPED | OUTPATIENT
Start: 2025-01-28

## 2025-01-28 RX ORDER — LIDOCAINE 50 MG/G
PATCH TOPICAL
Qty: 30 PATCH | Refills: 3 | Status: SHIPPED | OUTPATIENT
Start: 2025-01-28

## 2025-01-28 RX ORDER — GABAPENTIN 800 MG/1
800 TABLET ORAL 3 TIMES DAILY
Qty: 90 TABLET | Refills: 11 | Status: SHIPPED | OUTPATIENT
Start: 2025-01-28

## 2025-01-28 RX ORDER — QUETIAPINE FUMARATE 25 MG/1
25 TABLET, FILM COATED ORAL NIGHTLY
Qty: 30 TABLET | Refills: 3 | Status: SHIPPED | OUTPATIENT
Start: 2025-01-28 | End: 2025-05-28

## 2025-01-28 RX ORDER — SPIRONOLACTONE 25 MG/1
25 TABLET ORAL
Qty: 36 TABLET | Refills: 0 | Status: SHIPPED | OUTPATIENT
Start: 2025-01-29

## 2025-01-28 RX ORDER — ALBUTEROL SULFATE 90 UG/1
2 INHALANT RESPIRATORY (INHALATION) EVERY 6 HOURS PRN
Qty: 20.1 G | Refills: 2 | Status: SHIPPED | OUTPATIENT
Start: 2025-01-28

## 2025-01-28 RX ORDER — NYSTATIN 100000 [USP'U]/G
POWDER TOPICAL
Qty: 120 G | Refills: 11 | Status: SHIPPED | OUTPATIENT
Start: 2025-01-28 | End: 2025-01-31 | Stop reason: SDUPTHER

## 2025-01-28 RX ORDER — ATORVASTATIN CALCIUM 10 MG/1
10 TABLET, FILM COATED ORAL EVERY OTHER DAY
Qty: 45 TABLET | Refills: 0 | Status: SHIPPED | OUTPATIENT
Start: 2025-01-28

## 2025-01-28 RX ORDER — FUROSEMIDE 40 MG/1
40 TABLET ORAL DAILY
Qty: 90 TABLET | Refills: 1 | Status: SHIPPED | OUTPATIENT
Start: 2025-01-28

## 2025-01-28 RX ORDER — LACTULOSE 10 G/15ML
20 SOLUTION ORAL 2 TIMES DAILY
Qty: 1800 ML | Refills: 2 | Status: CANCELLED | OUTPATIENT
Start: 2025-01-28 | End: 2025-04-28

## 2025-01-28 RX ORDER — ONDANSETRON 8 MG/1
8 TABLET, ORALLY DISINTEGRATING ORAL EVERY 12 HOURS PRN
Qty: 40 TABLET | Refills: 3 | Status: SHIPPED | OUTPATIENT
Start: 2025-01-28

## 2025-01-28 RX ORDER — FLUTICASONE FUROATE, UMECLIDINIUM BROMIDE AND VILANTEROL TRIFENATATE 100; 62.5; 25 UG/1; UG/1; UG/1
1 POWDER RESPIRATORY (INHALATION) DAILY
Qty: 28 EACH | Refills: 3 | Status: SHIPPED | OUTPATIENT
Start: 2025-01-28 | End: 2026-01-28

## 2025-01-28 NOTE — TELEPHONE ENCOUNTER
Care Due:                  Date            Visit Type   Department     Provider  --------------------------------------------------------------------------------                                ESTABLISHED                              PATIENT -  Last Visit: 08-      VIRTUAL      None Found     Constantine Bird  Next Visit: None Scheduled  None         None Found                                                            Last  Test          Frequency    Reason                     Performed    Due Date  --------------------------------------------------------------------------------    HBA1C.......  6 months...  empagliflozin, metFORMIN,   08- 02-                             tirzepatide..............    Lipid Panel.  12 months..  atorvastatin.............  03- 03-    [facility] Embedded Care Due Messages. Reference number: [id number].   1/28/2025 9:12:26 AM CST

## 2025-01-28 NOTE — TELEPHONE ENCOUNTER
----- Message from Roselia sent at 1/28/2025  8:57 AM CST -----  Contact: self  Type: Needs Medical Advice  Who Called:  the patient     Pharmacy name and phone #:    [facility] - [place] - [address]  [address]  [place] [address]  Phone: [phone] Fax: [phone]    Best Call Back Number: [phone]  Additional Information: pt called to say she listed wrong pharmacy! Please do not send script refills to [facility] any more. Please send recent refill for ondansetron (ZOFRAN-ODT) 8 MG TbDL to the [facility]

## 2025-01-29 NOTE — TELEPHONE ENCOUNTER
----- Message from Shira Aguirre sent at 6/11/2019 10:02 AM CDT -----  Type:  Patient Returning Call    Who Called:  Todd   Who Left Message for Patient:  Dipika  Does the patient know what this is regarding?:  Primary care physician  Best Call Back Number: [phone]  Additional Information:  Patient states she contacted [facility] and they have Dr. Bird  Listed as her primary care physician, he is also listed on the back of her card     12 month recall

## 2025-01-31 DIAGNOSIS — B37.2 YEAST DERMATITIS: ICD-10-CM

## 2025-02-02 RX ORDER — NYSTATIN 100000 [USP'U]/G
POWDER TOPICAL
Qty: 120 G | Refills: 11 | Status: SHIPPED | OUTPATIENT
Start: 2025-02-02

## 2025-02-11 DIAGNOSIS — M51.369 DDD (DEGENERATIVE DISC DISEASE), LUMBAR: Chronic | ICD-10-CM

## 2025-02-11 RX ORDER — HYDROCODONE BITARTRATE AND ACETAMINOPHEN 7.5; 325 MG/1; MG/1
1 TABLET ORAL EVERY 6 HOURS PRN
Qty: 45 TABLET | Refills: 0 | OUTPATIENT
Start: 2025-02-11

## 2025-02-11 NOTE — TELEPHONE ENCOUNTER
No care due was identified.  [facility] Embedded Care Due Messages. Reference number: [id number].   2/11/2025 9:29:49 AM CST

## 2025-02-11 NOTE — TELEPHONE ENCOUNTER
Please call patient to schedule appointment, refill refused.  She already had a prescription early this month for a total 45 tablets.

## 2025-02-25 DIAGNOSIS — M51.369 LUMBAR DEGENERATIVE DISC DISEASE: ICD-10-CM

## 2025-02-25 RX ORDER — LIDOCAINE 50 MG/G
PATCH TOPICAL
Qty: 30 PATCH | Refills: 3 | OUTPATIENT
Start: 2025-02-25

## 2025-02-25 NOTE — TELEPHONE ENCOUNTER
Refill Routing Note   Medication(s) are not appropriate for processing by [facility] for the following reason(s):        Outside of protocol    [facility] action(s):  Route               Appointments  past 12m or future 3m with PCP    Date Provider   Last Visit   8/1/2024 Constantine Bird MD   Next Visit   Visit date not found Constantine Bird MD   ED visits in past 90 days: 0        Note composed:10:42 AM 02/25/2025

## 2025-02-27 ENCOUNTER — TELEPHONE (OUTPATIENT)
Dept: PRIMARY CARE CLINIC | Facility: CLINIC | Age: 75
End: 2025-02-27
Payer: MEDICARE

## 2025-02-27 NOTE — TELEPHONE ENCOUNTER
----- Message from Lucas sent at 2/27/2025  9:13 AM CST -----  Contact: Self  Type:  RX Refill RequestWho Called:  PatientRefill or New Rx:  RefillRX Name and Strength:  HYDROcodone-acetaminophen (NORCO) 7.5-325 mg per tabletHow is the patient currently taking it? (ex. 1XDay):  As PrescribedIs this a 30 day or 90 day RX:  90Preferred Pharmacy with phone number:  [facility] [address] [facility] [address] [place] [address]Evoht: [phone] Fax: [phone] or Mail Order:  LocalOrdering Provider:  Brandon Call Back Number:  [phone] Information:  Patient is requesting a call back and a refill

## 2025-02-27 NOTE — TELEPHONE ENCOUNTER
----- Message from Alberta sent at 2/27/2025 10:11 AM CST -----  Type: Needs Medical AdviceWho Called:  PatientSymptoms (please be specific):  How long has patient had these symptoms:  Pharmacy name and phone #:  Best Call Back Number: [phone] Information: Patient is requesting a call back regarding an appt. ASAP on a Tue. or Thur for annual and med. Refill..

## 2025-03-05 ENCOUNTER — EXTERNAL HOME HEALTH (OUTPATIENT)
Dept: HOME HEALTH SERVICES | Facility: HOSPITAL | Age: 75
End: 2025-03-05
Payer: MEDICARE

## 2025-03-06 ENCOUNTER — TELEPHONE (OUTPATIENT)
Dept: PRIMARY CARE CLINIC | Facility: CLINIC | Age: 75
End: 2025-03-06
Payer: MEDICARE

## 2025-03-06 NOTE — TELEPHONE ENCOUNTER
----- Message from Carissa sent at 3/6/2025  1:44 PM CST -----  Contact: self  Type:  Needs Medical AdviceWho Called: pt Symptoms (please be specific):  How long has patient had these symptoms:  Pharmacy name and phone #:  Would the patient rather a call back or a response via [facility]? callBest Call Back Number: [phone] Information: pt states she needs to r/s sai an would like to speak with nurse   Please call back to advise. Thanks!

## 2025-03-11 ENCOUNTER — TELEPHONE (OUTPATIENT)
Dept: PRIMARY CARE CLINIC | Facility: CLINIC | Age: 75
End: 2025-03-11
Payer: MEDICARE

## 2025-03-11 ENCOUNTER — OFFICE VISIT (OUTPATIENT)
Dept: PRIMARY CARE CLINIC | Facility: CLINIC | Age: 75
End: 2025-03-11
Payer: MEDICARE

## 2025-03-11 VITALS — DIASTOLIC BLOOD PRESSURE: 60 MMHG | SYSTOLIC BLOOD PRESSURE: 120 MMHG

## 2025-03-11 DIAGNOSIS — E11.59 HYPERTENSION ASSOCIATED WITH DIABETES: ICD-10-CM

## 2025-03-11 DIAGNOSIS — E11.51 TYPE 2 DIABETES MELLITUS WITH DIABETIC PERIPHERAL ANGIOPATHY WITHOUT GANGRENE, WITHOUT LONG-TERM CURRENT USE OF INSULIN: ICD-10-CM

## 2025-03-11 DIAGNOSIS — E78.5 HYPERLIPIDEMIA ASSOCIATED WITH TYPE 2 DIABETES MELLITUS: ICD-10-CM

## 2025-03-11 DIAGNOSIS — M51.362 DEGENERATION OF INTERVERTEBRAL DISC OF LUMBAR REGION WITH DISCOGENIC BACK PAIN AND LOWER EXTREMITY PAIN: ICD-10-CM

## 2025-03-11 DIAGNOSIS — J96.11 CHRONIC RESPIRATORY FAILURE WITH HYPOXIA: ICD-10-CM

## 2025-03-11 DIAGNOSIS — E66.01 OBESITY, MORBID, BMI 40.0-49.9: ICD-10-CM

## 2025-03-11 DIAGNOSIS — E11.69 HYPERLIPIDEMIA ASSOCIATED WITH TYPE 2 DIABETES MELLITUS: ICD-10-CM

## 2025-03-11 DIAGNOSIS — G89.29 CHRONIC LEFT-SIDED LOW BACK PAIN WITH LEFT-SIDED SCIATICA: ICD-10-CM

## 2025-03-11 DIAGNOSIS — F11.20 OPIOID DEPENDENCE, UNCOMPLICATED: ICD-10-CM

## 2025-03-11 DIAGNOSIS — I15.2 HYPERTENSION ASSOCIATED WITH DIABETES: ICD-10-CM

## 2025-03-11 DIAGNOSIS — D50.9 IRON DEFICIENCY ANEMIA, UNSPECIFIED IRON DEFICIENCY ANEMIA TYPE: ICD-10-CM

## 2025-03-11 DIAGNOSIS — I50.42 CHRONIC COMBINED SYSTOLIC AND DIASTOLIC CHF (CONGESTIVE HEART FAILURE): ICD-10-CM

## 2025-03-11 DIAGNOSIS — J44.9 CHRONIC OBSTRUCTIVE PULMONARY DISEASE, UNSPECIFIED COPD TYPE: ICD-10-CM

## 2025-03-11 DIAGNOSIS — I48.20 CHRONIC ATRIAL FIBRILLATION: ICD-10-CM

## 2025-03-11 DIAGNOSIS — M54.42 CHRONIC LEFT-SIDED LOW BACK PAIN WITH LEFT-SIDED SCIATICA: ICD-10-CM

## 2025-03-11 DIAGNOSIS — Z51.81 THERAPEUTIC DRUG MONITORING: ICD-10-CM

## 2025-03-11 DIAGNOSIS — E11.42 TYPE 2 DIABETES MELLITUS WITH DIABETIC POLYNEUROPATHY, WITHOUT LONG-TERM CURRENT USE OF INSULIN: ICD-10-CM

## 2025-03-11 DIAGNOSIS — F11.20 UNCOMPLICATED OPIOID DEPENDENCE: ICD-10-CM

## 2025-03-11 RX ORDER — HYDROCODONE BITARTRATE AND ACETAMINOPHEN 7.5; 325 MG/1; MG/1
1 TABLET ORAL EVERY 6 HOURS PRN
Qty: 90 TABLET | Refills: 0 | Status: SHIPPED | OUTPATIENT
Start: 2025-04-14

## 2025-03-11 RX ORDER — HYDROCODONE BITARTRATE AND ACETAMINOPHEN 7.5; 325 MG/1; MG/1
1 TABLET ORAL EVERY 6 HOURS PRN
Qty: 90 TABLET | Refills: 0 | Status: SHIPPED | OUTPATIENT
Start: 2025-03-11

## 2025-03-11 NOTE — TELEPHONE ENCOUNTER
----- Message from Radha sent at 3/11/2025  7:59 AM CDT -----  Regarding: virtual visit today?  Contact: pts daughter  Type:  Needs Medical AdviceWho Called: pts daughterWould the patient rather a call back or a response via [facility]? Call [place] Call Back Number: [phone] Information:  daughter asking if pt can be seen via video today.  Pt needs rx refills

## 2025-03-11 NOTE — PROGRESS NOTES
The patient location is: [place]  The chief complaint leading to consultation is: Chronic pain    Visit type: audiovisual    Face to Face time with patient: 20  20 minutes of total time spent on the encounter, which includes face to face time and non-face to face time preparing to see the patient (eg, review of tests), Obtaining and/or reviewing separately obtained history, Documenting clinical information in the electronic or other health record, Independently interpreting results (not separately reported) and communicating results to the patient/family/caregiver, or Care coordination (not separately reported).         Each patient to whom he or she provides medical services by telemedicine is:  (1) informed of the relationship between the physician and patient and the respective role of any other health care provider with respect to management of the patient; and (2) notified that he or she may decline to receive medical services by telemedicine and may withdraw from such care at any time.    Notes:   Subjective:       Patient ID: Nelly Tian is a 74 y.o. female.    Chief Complaint: Low-back Pain    Low-back Pain  This is a chronic (DJD lumbar, OA knees, OA hips) problem. The current episode started more than 1 year ago. The problem occurs intermittently. The problem has been unchanged. Associated symptoms include anorexia, arthralgias, a change in bowel habit, fatigue, a fever, myalgias, neck pain, a rash, urinary symptoms and weakness. Pertinent negatives include no abdominal pain, chest pain, congestion, coughing, headaches or joint swelling. The symptoms are aggravated by standing. She has tried immobilization and oral narcotics (opiates dependence) for the symptoms. The treatment provided mild relief.     Review of Systems   Constitutional:  Positive for fatigue and fever.   HENT:  Negative for congestion.    Respiratory:  Negative for cough.    Cardiovascular:  Negative for chest pain.    Gastrointestinal:  Positive for anorexia and change in bowel habit. Negative for abdominal pain.   Endocrine: Positive for polyuria.   Genitourinary:  Positive for urgency.   Musculoskeletal:  Positive for arthralgias, myalgias and neck pain. Negative for joint swelling.   Skin:  Positive for rash.        Chronic ventral hernia small ulcers.   Neurological:  Positive for weakness. Negative for headaches.   Psychiatric/Behavioral:  Positive for confusion, decreased concentration and sleep disturbance.        Problem List[1]    Objective:      Physical Exam  Constitutional:       Appearance: She is obese. She is ill-appearing.   HENT:      Head: Normocephalic and atraumatic.      Nose: Nose normal.      Mouth/Throat:      Mouth: Mucous membranes are moist.   Pulmonary:      Effort: Pulmonary effort is normal.   Musculoskeletal:      Cervical back: Normal range of motion.   Neurological:      General: No focal deficit present.       Lab Results   Component Value Date    WBC 12.73 (H) 11/24/2024    HGB 12.0 11/24/2024    HCT 39.1 11/24/2024     11/24/2024    CHOL 89 (L) 03/25/2022    TRIG 52 03/25/2022    HDL 37 (L) 03/25/2022    ALT 14 11/24/2024    AST 17 11/24/2024     (L) 11/24/2024    K 4.8 11/24/2024    CL 94 (L) 11/24/2024    CREATININE 0.7 11/24/2024    BUN 19 11/24/2024    CO2 27 11/24/2024    TSH 2.033 08/05/2024    INR 1.0 04/21/2024    HGBA1C 5.6 08/05/2024     The ASCVD Risk score (Denote CANO, et al., 2019) failed to calculate for the following reasons:    Risk score cannot be calculated because patient has a medical history suggesting prior/existing ASCVD    Assessment:       1. Hypertension associated with diabetes    2. Obesity, morbid, BMI 40.0-49.9    3. Uncomplicated opioid dependence    4. Chronic respiratory failure with hypoxia    5. Chronic combined systolic and diastolic CHF (congestive heart failure)    6. Chronic atrial fibrillation    7. Chronic obstructive pulmonary disease,  unspecified COPD type    8. Degeneration of intervertebral disc of lumbar region with discogenic back pain and lower extremity pain    9. Chronic left-sided low back pain with left-sided sciatica    10. Therapeutic drug monitoring    11. Hyperlipidemia associated with type 2 diabetes mellitus    12. Type 2 diabetes mellitus with diabetic peripheral angiopathy without gangrene, without long-term current use of insulin    13. Iron deficiency anemia, unspecified iron deficiency anemia type    14. Type 2 diabetes mellitus with diabetic polyneuropathy, without long-term current use of insulin    15. Opioid dependence, uncomplicated        Plan:       Hypertension associated with diabetes  -     COMPREHENSIVE METABOLIC PANEL; Future; Expected date: 03/11/2025  -     CBC W/ AUTO DIFFERENTIAL; Future; Expected date: 03/11/2025    Obesity, morbid, BMI 40.0-49.9    Uncomplicated opioid dependence    Chronic respiratory failure with hypoxia    Chronic combined systolic and diastolic CHF (congestive heart failure)  -     B-TYPE NATRIURETIC PEPTIDE; Future; Expected date: 03/11/2025    Chronic atrial fibrillation    Chronic obstructive pulmonary disease, unspecified COPD type    Degeneration of intervertebral disc of lumbar region with discogenic back pain and lower extremity pain  -     HYDROcodone-acetaminophen (NORCO) 7.5-325 mg per tablet; Take 1 tablet by mouth every 6 (six) hours as needed for Pain.  Dispense: 90 tablet; Refill: 0  -     HYDROcodone-acetaminophen (NORCO) 7.5-325 mg per tablet; Take 1 tablet by mouth every 6 (six) hours as needed for Pain.  Dispense: 90 tablet; Refill: 0    Chronic left-sided low back pain with left-sided sciatica  -     Pain Clinic Drug Screen; Future; Expected date: 03/12/2025    Therapeutic drug monitoring  -     Pain Clinic Drug Screen; Future; Expected date: 03/12/2025    Hyperlipidemia associated with type 2 diabetes mellitus  -     Lipid Panel; Future; Expected date:  03/11/2025    Type 2 diabetes mellitus with diabetic peripheral angiopathy without gangrene, without long-term current use of insulin  -     Hemoglobin A1C; Future; Expected date: 03/11/2025    Iron deficiency anemia, unspecified iron deficiency anemia type  -     Iron and TIBC; Future; Expected date: 03/11/2025  -     FERRITIN; Future; Expected date: 03/11/2025    Type 2 diabetes mellitus with diabetic polyneuropathy, without long-term current use of insulin  -     Microalbumin/creatinine urine ratio; Future; Expected date: 03/11/2025    Opioid dependence, uncomplicated  -     Pain Clinic Drug Screen; Future; Expected date: 03/12/2025    Labs to be collected by .   Hemoglobin A1C   Date Value Ref Range Status   08/05/2024 5.6 4.5 - 6.2 % Final     Comment:     ADA Screening Guidelines:  5.7-6.4%  Consistent with prediabetes  >or=6.5%  Consistent with diabetes    High levels of fetal hemoglobin interfere with the HbA1C  assay. Heterozygous hemoglobin variants (HbS, HgC, etc)do  not significantly interfere with this assay.   However, presence of multiple variants may affect accuracy.     05/20/2024 5.4 4.5 - 6.2 % Final     Comment:     ADA Screening Guidelines:  5.7-6.4%  Consistent with prediabetes  >or=6.5%  Consistent with diabetes    High levels of fetal hemoglobin interfere with the HbA1C  assay. Heterozygous hemoglobin variants (HbS, HgC, etc)do  not significantly interfere with this assay.   However, presence of multiple variants may affect accuracy.     02/19/2024 6.0 4.5 - 6.2 % Final     Comment:     According to ADA guidelines, hemoglobin A1C <7.0% represents  optimal control in non-pregnant diabetic patients.  Different  metrics may apply to specific populations.   Standards of Medical Care in Diabetes - 2016.    For the purpose of screening for the presence of diabetes:  <5.7%     Consistent with the absence of diabetes  5.7-6.4%  Consistent with increasing risk for diabetes   (prediabetes)  >or=6.5%   Consistent with diabetes    Currently no consensus exists for use of hemoglobin A1C  for diagnosis of diabetes for children.     I counseled the patient on HTN education, management and recommendations.  I recommended weight loss toward a BMI < 25, avoidance of salt and the DASH diet, regular cardio exercise a minimum of 150 minutes per week and medications if indicated.  Printed materials were given. The goal is < 140/90 unless otherwise specified.Thank you for your feedback.  Your message has been acknowledged by our staff and we will use it to improve patient support.Advised to call back directly if there are further questions, or if these symptoms fail to improve as anticipated or worsen.    Patient readiness: acceptance and barriers:readiness, social stressors, environmental, economic, and household issues    During the course of the visit the patient was educated and counseled about the following:     Diabetes:  Discussed general issues about diabetes pathophysiology and management.  Discussed foot care.  Reminded to get yearly retinal exam.  Discussed ways to avoid symptomatic hypoglycemia.  Labs: fasting blood sugar, fasting lipid panel, hemoglobin A1C, and microalbuminuria.  Hypertension:   Screening for causes of secondary hypertension: GFR (chronic kidney disease).  Obesity:   General weight loss/lifestyle modification strategies discussed (elicit support from others; identify saboteurs; non-food rewards, etc).  Informal exercise measures discussed, e.g. taking stairs instead of elevator.    Goals: Diabetes: Maintain Hemoglobin A1C below 7, Hypertension: Reduce Blood Pressure, and Obesity: Reduce calorie intake and BMI    Did patient meet goals/outcomes: No    The following self management tools provided: declined    Patient Instructions (the written plan) was given to the patient/family.     Time spent with patient: 30 minutes    Barriers to medications present (yes )    Adverse reactions to current  medications (no)    Over the counter medications reviewed (Yes)    4Ms for Medical Decision-Making in Older Adults    Last Completed EAWV: 2022    MEDICATIONS:  High Risk Medications:  Total Active Medications: 3  gabapentin - 800 MG  HYDROcodone-acetaminophen - 7.5-325 mg, 7.5-325 mg  QUEtiapine Tab - 25 MG    MOBILITY:  Activities of Daily Livin/28/2022     3:07 PM   Activities of Daily Living   Ambulation Independent   Dressing Independent   Transfers Independent   Toileting Continent of bladder;Continent of bowel   Feeding Independent   Cleaning home/Chores Independent   Telephone use Independent   Shopping Independent   Paying bills Independent   Taking meds Independent     Fall Risk:      2024    10:00 AM 2024     8:30 AM 2024    11:00 AM   Fall Risk Assessment - Outpatient   Mobility Status Ambulatory Ambulatory w/ assistance Ambulatory w/ assistance   Number of falls 0 0 0   Identified as fall risk False True True     Disability Status:      2023     9:44 AM   Disability Status   Are you deaf or do you have serious difficulty hearing? N   Are you blind or do you have serious difficulty seeing, even when wearing glasses? N   Because of a physical, mental, or emotional condition, do you have serious difficulty concentrating, remembering, or making decisions? N   Do you have serious difficulty walking or climbing stairs? Y   Do you have difficulty dressing or bathing? N   Because of a physical, mental, or emotional condition, do you have difficulty doing errands alone such as visiting a doctor's office or shopping? Y     Nutrition Screenin/28/2022     3:07 PM   Nutrition Screening   Has food intake declined over the past three months due to loss of appetite, digestive problems, chewing or swallowing difficulties? No decrease in food intake   Involuntary weight loss during the last 3 months? No weight loss   Mobility? Goes out   Has the patient suffered psychological  stress or acute disease in the past three months? No   Neuropsychological problems? No psychological problems   Body Mass Index (BMI)?  BMI 23 or greater   Screening Score 14   Interpretation Normal nutritional status    Screening Score: 0-7 Malnourished, 8-11 At Risk, 12-14 Normal  Get Up and Go:      2018    11:43 AM 2017    12:31 PM   Get Up and Go   Trial 1 13 seconds 15 seconds     Whisper Test:      2018    11:38 AM   Whisper Test   Whisper Test Normal           MENTATION:   Has Dementia Dx: No  Has Anxiety Dx: No    Depression Patient Health Questionnaire:      2024     8:17 AM   Depression Patient Health Questionnaire   Over the last two weeks how often have you been bothered by little interest or pleasure in doing things Not at all   Over the last two weeks how often have you been bothered by feeling down, depressed or hopeless Not at all   PHQ-2 Total Score 0     Cognitive Function Screenin/28/2022     3:10 PM   Cognitive Function Screening   Mini-Cog 3 Minute Recall 3   Cognitive Function Screening 5     Cognitive Function Screening Total - Less than 4 = Abnormal,  Greater than or equal to 4 = Normal        WHAT MATTERS MOST:  Advance Care Planning   ACP Status:   Patient has had an ACP conversation  Living Will: No  Power of : No  [place]: No    What is most important right now is to focus on remaining as independent as possiblesymptom/pain controlquality of life, even if it means sacrificing a little time    Accordingly, we have decided that the best plan to meet the patient's goals includes continuing with treatment      What matters most to patient today is: Away from the hospital           30-minute visit. 10 minutes spent counseling patient on diet, exercise, and weight loss.  This has been fully explained to the patient, who indicates understanding.             [1]  Patient Active Problem List  Diagnosis    Severe obesity (BMI 35.0-35.9 with comorbidity)     Long term current use of anticoagulant therapy    Major depression, recurrent, chronic    GERD (gastroesophageal reflux disease)    Personal history of tobacco use    Chronic atrial fibrillation    Hypertension associated with diabetes    PVD (peripheral vascular disease)    Abdominal aortic atherosclerosis    Iron deficiency anemia    DDD (degenerative disc disease), lumbar    Type 2 diabetes mellitus with diabetic neuropathy, without long-term current use of insulin    Hyperlipidemia associated with type 2 diabetes mellitus    Mixed restrictive and obstructive lung disease    Hypercapnic respiratory failure, chronic    Chronic obstructive pulmonary disease (COPD)    Hyperkalemia    Hepatomegaly    Chronic combined systolic and diastolic CHF (congestive heart failure)    Retinal detachment of left eye with multiple breaks    Chronic pain syndrome    Abdominal wall cellulitis    Encephalopathy, metabolic    Uncomplicated opioid dependence    Flank hernia    Iron deficiency anemia due to sideropenic dysphagia    Intertriginous dermatitis associated with moisture    Parathyroid related hypercalcemia    Cystitis

## 2025-03-11 NOTE — TELEPHONE ENCOUNTER
Sw pt daughter Maye. Unable to make appointment today. Maye's son is disabled and HH nurse recently quit. They are unable to find a sitter to watch him while daughter brings pt to appt. Requesting appt to be changed to [facility]. Appt switched to [facility] visit per request.

## 2025-03-11 NOTE — TELEPHONE ENCOUNTER
----- Message from Quiana sent at 3/11/2025  8:54 AM CDT -----  Type: Needs Medical AdviceWho Called:  ptSymptoms (please be specific):  How long has patient had these symptoms:  Pharmacy name and phone #:  Best Call Back Number: [phone] Information: Pt is asking for nurse to call her daughter, Maye Parks at [phone].  Please call back to advise, thank you!

## 2025-03-11 NOTE — PATIENT INSTRUCTIONS
"Vaccines for Adults   The Basics   Written by the doctors and editors at [facility]   What are vaccines? -- Vaccines can prevent certain serious or deadly infections. They are a way of teaching your body how to fight the germs that cause infections. Thanks to vaccines, many fewer people get seriously ill or die from infections than in the past.  Vaccines usually come in shots, but some come in nose sprays or medicines you swallow. When a person gets a vaccine, this is called "vaccination" or "immunization."  Why should I get vaccinated? -- Getting vaccinated can help keep you from getting certain infections. If you do get an infection, being vaccinated can also keep you from getting severely ill.  In some cases, being vaccinated also helps protect other people around you. For diseases that can spread from person to person, the goal of vaccines is to get to "herd immunity." Herd immunity is when enough people are immune to a disease that it can no longer spread easily. To get to herd immunity, lots of people need to get vaccinated. This helps protect people who cannot get vaccinated for some reason.  Which vaccines should I get? -- Your doctor or nurse will tell you which vaccines you should get.  There are some vaccines that all adults should get, even if they got their childhood vaccines. These vaccines protect against the following infections:  Coronavirus disease 2019 (COVID-19) - This is an infection caused by a virus called SARS-CoV-2. It can cause a fever, cough, and trouble breathing, along with other symptoms. Some people get severely ill from COVID-19.  Influenza (flu) - The flu can cause fever, chills, muscle aches, cough, and sore throat. It can even cause a lung infection.  Pertussis - This infection is also known as "whooping cough" and can cause a severe breathing illness in babies. It can also make older children and adults sick. Vaccinating adults helps prevent babies around them from getting the " "infection. The pertussis vaccine comes in the same shot as the diphtheria and tetanus vaccines.  Diphtheria and tetanus - Vaccines against these 2 diseases are usually together in 1 shot, or in a shot with the pertussis vaccine. Diphtheria can cause a thick covering in the back of the throat that can lead to breathing problems. Tetanus causes the muscles to work abnormally.   Some adults will need other vaccines, depending on their age, medical conditions, jobs, travel plans, and other factors. These can include vaccines to protect against:  Pneumococcus - Pneumococcus is a germ that can cause an infection of the lungs, ears, blood, or tissues around the brain.  Meningococcus - Meningococcus is a germ that can cause an infection of the blood or tissues around the brain.  Herpes zoster, also called "shingles" - Shingles can cause a painful skin rash and blisters.  Human papillomavirus (HPV) - HPV infection can lead to cancer of the cervix (in women). It can also cause genital warts in men and women. Young adults, especially women, should get this vaccine.  Other infections - These include measles, chickenpox, hepatitis B, and hepatitis A.  How many vaccine doses do I need? -- Each vaccine is different. Some vaccines work after just 1 dose. Others require 2 or more doses to prevent an infection.  Some vaccines prevent an infection for the rest of your life. Others do not. A "booster" is a vaccine dose that you get after a certain number of years. It reminds the body how to prevent an infection. People who got childhood vaccines sometimes need booster doses in adulthood. People who travel to other countries also sometimes need booster doses of certain vaccines.  When should I be vaccinated? -- Different vaccines are given at different ages. Your doctor or nurse will recommend a vaccine schedule that is right for you. For most vaccines, it takes a couple of weeks before you are fully protected. This is because it takes " time for your body to prepare to fight the infection.  Do vaccines cause side effects? -- They can. Often vaccines cause no side effects, but sometimes they do. When side effects happen, they can include:  Redness, mild swelling, or soreness where you got the shot  A mild fever  A mild rash  Headache or body aches  These side effects do not mean you are sick, just that your immune system (infection-fighting system) is responding to the vaccine.  Vaccines also sometimes cause more serious side effects, such as severe allergic reactions. But serious side effects are rare.  Ask your doctor or nurse what side effects to expect each time you get a vaccine. If you have a reaction or a problem after a vaccine, let them know.  What if I am pregnant or want to get pregnant? -- If you want to get pregnant, ask your doctor or nurse if you need any vaccines. Some vaccines must be given before pregnancy. Others are important to get during pregnancy. Getting the right vaccines before and during pregnancy can protect you and your baby from serious infections.  All topics are updated as new evidence becomes available and our peer review process is complete.  This topic retrieved from [employer] on: Sep 21, 2021.  Topic 36290 Version 15.0  Release: 29.4.2 - C29.263  © 2021 UpToDate, Inc. and/or its affiliates. All rights reserved.  Consumer Information Use and Disclaimer   This information is not specific medical advice and does not replace information you receive from your health care provider. This is only a brief summary of general information. It does NOT include all information about conditions, illnesses, injuries, tests, procedures, treatments, therapies, discharge instructions or life-style choices that may apply to you. You must talk with your health care provider for complete information about your health and treatment options. This information should not be used to decide whether or not to accept your health care provider's  advice, instructions or recommendations. Only your health care provider has the knowledge and training to provide advice that is right for you. The use of this information is governed by the [employer] End User License Agreement, available at https://[email or web address]/en/solutions/Square/[email or web address].The use of [employer] content is governed by the [employer] Terms of Use. ©2021 UpToDate, Inc. All rights reserved.  Copyright   © 2021 UpToDate, Inc. and/or its affiliates. All rights reserved. Patient Education       Type 2 Diabetes Discharge Instructions   About this topic   Type 2 diabetes is the most common type of diabetes. If you have this illness, your body does not make enough insulin or does not correctly use the insulin it does make. When you eat, your body breaks down all sugars and starches into glucose. Your body needs insulin to use glucose for energy. The insulin takes the glucose from your blood into your cells. If you do not have enough insulin, or your body does not use the insulin well, the glucose or sugar stays in your blood instead of going into your cells. This causes your blood sugar levels to be too high. Over time, this can damage your heart, nerves, eyes, kidneys, and other organs.     What care is needed at home?   Learn how to take care of your diabetes.  Ask your doctor what you need to do when you go home. Make sure you ask questions if you do not understand what the doctor says. This way you will know what you need to do.  Based on what diabetes drugs you take, you might need to check your blood sugar regularly at home. But not everyone with type 2 diabetes needs to do this. If you need to, your doctor will teach you how to check your blood sugar. Ask what the goal numbers are for your blood sugar. Keep a list of your blood sugar levels. This will help you learn what causes high or low readings and help you manage your diabetes.     Take your diabetes drugs as directed. Ask what to do if  you miss a dose of your diabetes drugs.  Learn when, what, and how much to eat.  Be active. Walk, garden, or do something active for 30 minutes or more on most days of the week. This will also help you control your weight. Ask your doctor for proper food and exercise programs to follow.  Check your feet often. Look for blisters or sores. Always wear socks and shoes. Never walk barefoot, especially outdoors. Take special care around the pool and at the beach, as these surfaces may be extremely hot and burn your feet. Report any problems with your feet to your doctor.  Wear a medical ID.  Limit or avoid beer, wine, and mixed drinks (alcohol).  If you smoke, try to quit. Your doctor or nurse can help.  Talk with your doctor about if you need to check your blood sugar at home.  If you are overweight, ask your doctor if you would be a good candidate for bariatric surgery as this can reverse diabetes in some cases.  What follow-up care is needed?   Your doctor may ask you to make visits to the office to check on your progress. Be sure to keep these visits.  What drugs may be needed?   Diabetes drugs will help control your blood sugar. You may have more than one diabetes drug. Your doctor may order drugs for you to take by mouth or insulin as a shot. You will be trained on how to give insulin shots, if needed. Talk to your doctor about your diabetes drugs and what you need to do when you go home.  Will physical activity be limited?   Being active can lower blood sugar and blood pressure. It can also help control weight. Be sure to check with your doctor before starting any exercise program.  Try to walk, bike, or swim every day. Start with 5 to 10 minutes each day. Work up to about 30 minutes most days.  Drink lots of water during workouts.  What changes to diet are needed?   Eating a healthy diet is important. This means you need to eat regularly throughout the day. You need to include a variety of foods such as fruits  and vegetables, whole grains, nonfat dairy products, and lean meats. Do not eat too much food at one time and do not skip meals. Limit foods high in sugar like sweets, desserts, and fruit juices. Ask your doctor about what kind of diet is right for you.  What problems could happen?   Heart, kidney, and nerve problems  Foot problems. Sores and infection may also happen.  Eye problems  Dangerously high blood sugar levels requiring emergency treatment  Severe infections  Talk with your doctor often. Other drugs or care may be needed to treat or prevent these problems.  When do I need to call the doctor?   You have signs of high blood sugar like you:  Are more thirsty  Are urinating more often  Have new shortness of breath  Have belly pain, an upset stomach, or are throwing up.  Are more tired than normal  Have a very dry mouth or a fruity breath odor  Signs of infection. These include a fever of 100.4°F (38°C) or higher, chills, or a wound that will not heal.  Blurred vision  Chest pain  Swelling in your legs  Change in color and odor of your feet  Blood sugar that remains high and does not respond to treatment  Helpful tips   Talk to your doctor about getting a flu shot and pneumonia shot.  Teach Back: Helping You Understand   The Teach Back Method helps you understand the information we are giving you. After you talk with the staff, tell them in your own words what you learned. This helps to make sure the staff has described each thing clearly. It also helps to explain things that may have been confusing. Before going home, make sure you can do these:  I can tell you about my condition.  I can tell you what I need to do to control my blood sugar.  I can tell you what I will do if I have signs of high blood sugar.  Where can I learn more?   Center for Disease Control and Prevention  https://www.cdc.gov/diabetes/basics/type2.html   International Diabetes  [facility]  https://www.idf.org/aboutdiabetes/type-2-diabetes.html   [employer]  https://www.GroundCntrl.Bill.com/contents/type-2-diabetes-overview-beyond-the-basics   Last Reviewed Date   2021-05-04  Consumer Information Use and Disclaimer   This information is not specific medical advice and does not replace information you receive from your health care provider. This is only a brief summary of general information. It does NOT include all information about conditions, illnesses, injuries, tests, procedures, treatments, therapies, discharge instructions or life-style choices that may apply to you. You must talk with your health care provider for complete information about your health and treatment options. This information should not be used to decide whether or not to accept your health care providers advice, instructions or recommendations. Only your health care provider has the knowledge and training to provide advice that is right for you.  Copyright   Copyright © 2021 UpToDate, Inc. and its affiliates and/or licensors. All rights reserved. DASH diet for Hypertension and Healthy Eating  Provided by the [facility]    Healthy Lifestyle   Nutrition and healthy eating  The DASH diet emphasizes portion size, eating a variety of foods and getting the right amount of nutrients. Discover how DASH can improve your health and lower your blood pressure.  By [facility] Staff   DASH stands for Dietary Approaches to Stop Hypertension. The DASH diet is a lifelong approach to healthy eating that's designed to help treat or prevent high blood pressure (hypertension). The DASH diet encourages you to reduce the sodium in your diet and eat a variety of foods rich in nutrients that help lower blood pressure, such as potassium, calcium and magnesium.  By following the DASH diet, you may be able to reduce your blood pressure by a few points in just two weeks. Over time, your systolic blood pressure could drop by eight to 14 points, which  can make a significant difference in your health risks.  Because the DASH diet is a healthy way of eating, it offers health benefits besides just lowering blood pressure. The DASH diet is also in line with dietary recommendations to prevent osteoporosis, cancer, heart disease, stroke and diabetes.  The DASH diet emphasizes vegetables, fruits and low-fat dairy foods -- and moderate amounts of whole grains, fish, poultry and nuts.  In addition to the standard DASH diet, there is also a lower sodium version of the diet. You can choose the version of the diet that meets your health needs:  Standard DASH diet. You can consume up to 2,300 milligrams (mg) of sodium a day.   Lower sodium DASH diet. You can consume up to 1,500 mg of sodium a day.  Both versions of the DASH diet aim to reduce the amount of sodium in your diet compared with what you might get in a typical American diet, which can amount to a whopping 3,400 mg of sodium a day or more.  The standard DASH diet meets the recommendation from the Dietary Guidelines for Americans to keep daily sodium intake to less than 2,300 mg a day.  The American Heart Association recommends 1,500 mg a day of sodium as an upper limit for all adults. If you aren't sure what sodium level is right for you, talk to your doctor.  Both versions of the DASH diet include lots of whole grains, fruits, vegetables and low-fat dairy products. The DASH diet also includes some fish, poultry and legumes, and encourages a small amount of nuts and seeds a few times a week.   You can eat red meat, sweets and fats in small amounts. The DASH diet is low in saturated fat, cholesterol and total fat.  Here's a look at the recommended servings from each food group for the 2,418-[id number]-day DASH diet.  Grains: 6 to 8 servings a day  Grains include bread, cereal, rice and pasta. Examples of one serving of grains include 1 slice whole-wheat bread, 1 ounce dry cereal, or 1/2 cup cooked cereal, rice or  "pasta.  Focus on whole grains because they have more fiber and nutrients than do refined grains. For instance, use brown rice instead of white rice, whole-wheat pasta instead of regular pasta and whole-grain bread instead of white bread. Look for products labeled "100 percent whole grain" or "100 percent whole wheat."   Grains are naturally low in fat. Keep them this way by avoiding butter, cream and cheese sauces.  Vegetables: 4 to 5 servings a day  Tomatoes, carrots, broccoli, sweet potatoes, greens and other vegetables are full of fiber, vitamins, and such minerals as potassium and magnesium. Examples of one serving include 1 cup raw leafy green vegetables or 1/2 cup cut-up raw or cooked vegetables.  Don't think of vegetables only as side dishes -- a hearty blend of vegetables served over brown rice or whole-wheat noodles can serve as the main dish for a meal.   Fresh and frozen vegetables are both good choices. When buying frozen and canned vegetables, choose those labeled as low sodium or without added salt.   To increase the number of servings you fit in daily, be creative. In a stir-curry, for instance, cut the amount of meat in half and double up on the vegetables.  Fruits: 4 to 5 servings a day  Many fruits need little preparation to become a healthy part of a meal or snack. Like vegetables, they're packed with fiber, potassium and magnesium and are typically low in fat -- coconuts are an exception. Examples of one serving include one medium fruit, 1/2 cup fresh, frozen or canned fruit, or 4 ounces of juice.  Have a piece of fruit with meals and one as a snack, then round out your day with a dessert of fresh fruits topped with a dollop of low-fat yogurt.   Leave on edible peels whenever possible. The peels of apples, pears and most fruits with pits add interesting texture to recipes and contain healthy nutrients and fiber.   Remember that citrus fruits and juices, such as grapefruit, can interact with certain " medications, so check with your doctor or pharmacist to see if they're OK for you.   If you choose canned fruit or juice, make sure no sugar is added.  Dairy: 2 to 3 servings a day  Milk, yogurt, cheese and other dairy products are major sources of calcium, vitamin D and protein. But the key is to make sure that you choose dairy products that are low fat or fat-free because otherwise they can be a major source of fat -- and most of it is saturated. Examples of one serving include 1 cup skim or 1 percent milk, 1 cup low fat yogurt, or 1 1/2 ounces part-skim cheese.  Low-fat or fat-free frozen yogurt can help you boost the amount of dairy products you eat while offering a sweet treat. Add fruit for a healthy twist.   If you have trouble digesting dairy products, choose lactose-free products or consider taking an over-the-counter product that contains the enzyme lactase, which can reduce or prevent the symptoms of lactose intolerance.   Go easy on regular and even fat-free cheeses because they are typically high in sodium.  Lean meat, poultry and fish: 6 servings or fewer a day  Meat can be a rich source of protein, B vitamins, iron and zinc. Choose lean varieties and aim for no more than 6 ounces a day. Cutting back on your meat portion will allow room for more vegetables.  Trim away skin and fat from poultry and meat and then bake, broil, grill or roast instead of frying in fat.   Eat heart-healthy fish, such as salmon, herring and tuna. These types of fish are high in omega-3 fatty acids, which can help lower your total cholesterol.  Nuts, seeds and legumes: 4 to 5 servings a week  Almonds, sunflower seeds, kidney beans, peas, lentils and other foods in this family are good sources of magnesium, potassium and protein. They're also full of fiber and phytochemicals, which are plant compounds that may protect against some cancers and cardiovascular disease.  Serving sizes are small and are intended to be consumed only  a few times a week because these foods are high in calories. Examples of one serving include 1/3 cup nuts, 2 tablespoons seeds, or 1/2 cup cooked beans or peas.   Nuts sometimes get a bad rap because of their fat content, but they contain healthy types of fat -- monounsaturated fat and omega-3 fatty acids. They're high in calories, however, so eat them in moderation. Try adding them to stir-fries, salads or cereals.   Soybean-based products, such as tofu and tempeh, can be a good alternative to meat because they contain all of the amino acids your body needs to make a complete protein, just like meat.  Fats and oils: 2 to 3 servings a day  Fat helps your body absorb essential vitamins and helps your body's immune system. But too much fat increases your risk of heart disease, diabetes and obesity. The DASH diet strives for a healthy balance by limiting total fat to less than 30 percent of daily calories from fat, with a focus on the healthier monounsaturated fats.  Examples of one serving include 1 teaspoon soft margarine, 1 tablespoon mayonnaise or 2 tablespoons salad dressing.  Saturated fat and trans fat are the main dietary culprits in increasing your risk of coronary artery disease. DASH helps keep your daily saturated fat to less than 6 percent of your total calories by limiting use of meat, butter, cheese, whole milk, cream and eggs in your diet, along with foods made from lard, solid shortenings, and palm and coconut oils.   Avoid trans fat, commonly found in such processed foods as crackers, baked goods and fried items.   Read food labels on margarine and salad dressing so that you can choose those that are lowest in saturated fat and free of trans fat.  Sweets: 5 servings or fewer a week  You don't have to banish sweets entirely while following the DASH diet -- just go easy on them. Examples of one serving include 1 tablespoon sugar, jelly or jam, 1/2 cup sorbet, or 1 cup lemonade.  When you eat sweets,  "choose those that are fat-free or low-fat, such as sorbets, fruit ices, jelly beans, hard candy, ana crackers or low-fat cookies.   Artificial sweeteners such as aspartame (NutraSweet, Equal) and sucralose (Splenda) may help satisfy your sweet tooth while sparing the sugar. But remember that you still must use them sensibly. It's OK to swap a diet cola for a regular cola, but not in place of a more nutritious beverage such as low-fat milk or even plain water.   Cut back on added sugar, which has no nutritional value but can pack on calories.  Drinking too much alcohol can increase blood pressure. The Dietary Guidelines for Americans recommends that men limit alcohol to no more than two drinks a day and women to one or less.  The DASH diet doesn't address caffeine consumption. The influence of caffeine on blood pressure remains unclear. But caffeine can cause your blood pressure to rise at least temporarily. If you already have high blood pressure or if you think caffeine is affecting your blood pressure, talk to your doctor about your caffeine consumption.  While the DASH diet is not a weight-loss program, you may indeed lose unwanted pounds because it can help guide you toward healthier food choices.  The DASH diet generally includes about 2,000 calories a day. If you're trying to lose weight, you may need to eat fewer calories. You may also need to adjust your serving goals based on your individual circumstances -- something your health care team can help you decide.  The foods at the core of the DASH diet are naturally low in sodium. So just by following the DASH diet, you're likely to reduce your sodium intake. You also reduce sodium further by:  Using sodium-free spices or flavorings with your food instead of salt   Not adding salt when cooking rice, pasta or hot cereal   Rinsing canned foods to remove some of the sodium   Buying foods labeled "no salt added," "sodium-free," "low sodium" or "very low " "sodium"  One teaspoon of table salt has 2,325 mg of sodium. When you read food labels, you may be surprised at just how much sodium some processed foods contain. Even low-fat soups, canned vegetables, ready-to-eat cereals and sliced turkey from the local deli -- foods you may have considered healthy -- often have lots of sodium.  You may notice a difference in taste when you choose low-sodium food and beverages. If things seem too bland, gradually introduce low-sodium foods and cut back on table salt until you reach your sodium goal. That'll give your palate time to adjust.  Using salt-free seasoning blends or herbs and spices may also ease the transition. It can take several weeks for your taste buds to get used to less salty foods.  Try these strategies to get started on the DASH diet:   Change gradually. If you now eat only one or two servings of fruits or vegetables a day, try to add a serving at lunch and one at dinner. Rather than switching to all whole grains, start by making one or two of your grain servings whole grains. Increasing fruits, vegetables and whole grains gradually can also help prevent bloating or diarrhea that may occur if you aren't used to eating a diet with lots of fiber. You can also try over-the-counter products to help reduce gas from beans and vegetables.   Reward successes and forgive slip-ups. Reward yourself with a nonfood treat for your accomplishments -- rent a movie, purchase a book or get together with a friend. Everyone slips, especially when learning something new. Remember that changing your lifestyle is a long-term process. Find out what triggered your setback and then just  where you left off with the DASH diet.   Add physical activity. To boost your blood pressure lowering efforts even more, consider increasing your physical activity in addition to following the DASH diet. Combining both the DASH diet and physical activity makes it more likely that you'll reduce your " blood pressure.   Get support if you need it. If you're having trouble sticking to your diet, talk to your doctor or dietitian about it. You might get some tips that will help you stick to the DASH diet.  Remember, healthy eating isn't an all-or-nothing proposition. What's most important is that, on average, you eat healthier foods with plenty of variety -- both to keep your diet nutritious and to avoid boredom or extremes. And with the DASH diet, you can have both. Counseled patient on habit forming potential of opiates, risk of sedation, respiratory suppression or arrest especially if used in conjunction with benzodiazepines or alcohol.  Counseled patient to avoid driving while on the medication, rules of the pain contract and need for routine 3 month follow ups.  Patient agrees to all aspects of the plan of care and wishes to proceed with therapy.  The plan is always to use alternatives less habit forming, to utilize interventions and therapies that reduce pain as an adjunctive treatment, and limit and wean the dose of narcotics as soon as able and appropriate.

## 2025-03-17 ENCOUNTER — LAB VISIT (OUTPATIENT)
Dept: LAB | Facility: HOSPITAL | Age: 75
End: 2025-03-17
Attending: FAMILY MEDICINE
Payer: MEDICARE

## 2025-03-17 DIAGNOSIS — I50.42 CHRONIC COMBINED SYSTOLIC AND DIASTOLIC HEART FAILURE: ICD-10-CM

## 2025-03-17 DIAGNOSIS — E11.42 DIABETIC POLYNEUROPATHY: Primary | ICD-10-CM

## 2025-03-17 LAB
ALBUMIN SERPL BCP-MCNC: 4 G/DL (ref 3.5–5.2)
ALBUMIN/CREAT UR: 12.3 UG/MG (ref 0–30)
ALP SERPL-CCNC: 88 U/L (ref 40–150)
ALT SERPL W/O P-5'-P-CCNC: 17 U/L (ref 10–44)
ANION GAP SERPL CALC-SCNC: 16 MMOL/L (ref 8–16)
AST SERPL-CCNC: 26 U/L (ref 10–40)
BASOPHILS # BLD AUTO: 0.06 K/UL (ref 0–0.2)
BASOPHILS NFR BLD: 0.8 % (ref 0–1.9)
BILIRUB SERPL-MCNC: 0.6 MG/DL (ref 0.1–1)
BNP SERPL-MCNC: 137 PG/ML (ref 0–99)
BUN SERPL-MCNC: 17 MG/DL (ref 8–23)
CALCIUM SERPL-MCNC: 11.2 MG/DL (ref 8.7–10.5)
CHLORIDE SERPL-SCNC: 94 MMOL/L (ref 95–110)
CHOLEST SERPL-MCNC: 149 MG/DL (ref 120–199)
CHOLEST/HDLC SERPL: 3 {RATIO} (ref 2–5)
CO2 SERPL-SCNC: 28 MMOL/L (ref 23–29)
CREAT SERPL-MCNC: 0.8 MG/DL (ref 0.5–1.4)
CREAT UR-MCNC: 86.8 MG/DL (ref 15–325)
DIFFERENTIAL METHOD BLD: ABNORMAL
EOSINOPHIL # BLD AUTO: 0.1 K/UL (ref 0–0.5)
EOSINOPHIL NFR BLD: 1.2 % (ref 0–8)
ERYTHROCYTE [DISTWIDTH] IN BLOOD BY AUTOMATED COUNT: 14.7 % (ref 11.5–14.5)
EST. GFR  (NO RACE VARIABLE): >60 ML/MIN/1.73 M^2
ESTIMATED AVG GLUCOSE: 114 MG/DL (ref 68–131)
FERRITIN SERPL-MCNC: 32 NG/ML (ref 20–300)
GLUCOSE SERPL-MCNC: 58 MG/DL (ref 70–110)
HBA1C MFR BLD: 5.6 % (ref 4.5–6.2)
HCT VFR BLD AUTO: 44.4 % (ref 37–48.5)
HDLC SERPL-MCNC: 49 MG/DL (ref 40–75)
HDLC SERPL: 32.9 % (ref 20–50)
HGB BLD-MCNC: 13.3 G/DL (ref 12–16)
IMM GRANULOCYTES # BLD AUTO: 0.02 K/UL (ref 0–0.04)
IMM GRANULOCYTES NFR BLD AUTO: 0.3 % (ref 0–0.5)
IRON SERPL-MCNC: 70 UG/DL (ref 30–160)
LDLC SERPL CALC-MCNC: 74.2 MG/DL (ref 63–159)
LYMPHOCYTES # BLD AUTO: 2.3 K/UL (ref 1–4.8)
LYMPHOCYTES NFR BLD: 31 % (ref 18–48)
MCH RBC QN AUTO: 27.8 PG (ref 27–31)
MCHC RBC AUTO-ENTMCNC: 30 G/DL (ref 32–36)
MCV RBC AUTO: 93 FL (ref 82–98)
MICROALBUMIN UR DL<=1MG/L-MCNC: 10.7 UG/ML (ref 0–4999.9)
MONOCYTES # BLD AUTO: 1 K/UL (ref 0.3–1)
MONOCYTES NFR BLD: 14 % (ref 4–15)
NEUTROPHILS # BLD AUTO: 3.9 K/UL (ref 1.8–7.7)
NEUTROPHILS NFR BLD: 52.7 % (ref 38–73)
NONHDLC SERPL-MCNC: 100 MG/DL
NRBC BLD-RTO: 0 /100 WBC
PLATELET # BLD AUTO: 270 K/UL (ref 150–450)
PMV BLD AUTO: 11 FL (ref 9.2–12.9)
POTASSIUM SERPL-SCNC: 4.3 MMOL/L (ref 3.5–5.1)
PROT SERPL-MCNC: 9.1 G/DL (ref 6–8.4)
RBC # BLD AUTO: 4.78 M/UL (ref 4–5.4)
SATURATED IRON: 14 % (ref 20–50)
SODIUM SERPL-SCNC: 138 MMOL/L (ref 136–145)
TOTAL IRON BINDING CAPACITY: 504 UG/DL (ref 250–450)
TRANSFERRIN SERPL-MCNC: 360 MG/DL (ref 200–375)
TRIGL SERPL-MCNC: 129 MG/DL (ref 30–150)
WBC # BLD AUTO: 7.43 K/UL (ref 3.9–12.7)

## 2025-03-17 PROCEDURE — 82728 ASSAY OF FERRITIN: CPT | Performed by: FAMILY MEDICINE

## 2025-03-17 PROCEDURE — 80061 LIPID PANEL: CPT | Performed by: FAMILY MEDICINE

## 2025-03-17 PROCEDURE — 36415 COLL VENOUS BLD VENIPUNCTURE: CPT | Performed by: FAMILY MEDICINE

## 2025-03-17 PROCEDURE — 83036 HEMOGLOBIN GLYCOSYLATED A1C: CPT | Performed by: FAMILY MEDICINE

## 2025-03-17 PROCEDURE — 83880 ASSAY OF NATRIURETIC PEPTIDE: CPT | Performed by: FAMILY MEDICINE

## 2025-03-17 PROCEDURE — 85025 COMPLETE CBC W/AUTO DIFF WBC: CPT | Performed by: FAMILY MEDICINE

## 2025-03-17 PROCEDURE — 82043 UR ALBUMIN QUANTITATIVE: CPT | Performed by: FAMILY MEDICINE

## 2025-03-17 PROCEDURE — 84466 ASSAY OF TRANSFERRIN: CPT | Performed by: FAMILY MEDICINE

## 2025-03-17 PROCEDURE — 80053 COMPREHEN METABOLIC PANEL: CPT | Performed by: FAMILY MEDICINE

## 2025-03-23 ENCOUNTER — RESULTS FOLLOW-UP (OUTPATIENT)
Dept: PRIMARY CARE CLINIC | Facility: CLINIC | Age: 75
End: 2025-03-23

## 2025-03-25 NOTE — ASSESSMENT & PLAN NOTE
Stable. Check BG prior to meals and QHS. Administer rapid-acting insulin according to low-dose sliding scale  Hold oral antihyperglymics   Ambulatory

## 2025-03-28 DIAGNOSIS — M51.369 LUMBAR DEGENERATIVE DISC DISEASE: ICD-10-CM

## 2025-03-28 DIAGNOSIS — E11.51 TYPE 2 DIABETES MELLITUS WITH DIABETIC PERIPHERAL ANGIOPATHY WITHOUT GANGRENE, WITHOUT LONG-TERM CURRENT USE OF INSULIN: ICD-10-CM

## 2025-03-31 ENCOUNTER — PATIENT MESSAGE (OUTPATIENT)
Dept: ADMINISTRATIVE | Facility: HOSPITAL | Age: 75
End: 2025-03-31
Payer: MEDICARE

## 2025-03-31 ENCOUNTER — PATIENT OUTREACH (OUTPATIENT)
Dept: ADMINISTRATIVE | Facility: HOSPITAL | Age: 75
End: 2025-03-31
Payer: MEDICARE

## 2025-03-31 RX ORDER — TIRZEPATIDE 2.5 MG/.5ML
2.5 INJECTION, SOLUTION SUBCUTANEOUS
Qty: 12 PEN | Refills: 1 | Status: SHIPPED | OUTPATIENT
Start: 2025-03-31

## 2025-03-31 RX ORDER — LIDOCAINE 50 MG/G
PATCH TOPICAL
Qty: 30 PATCH | Refills: 3 | Status: SHIPPED | OUTPATIENT
Start: 2025-03-31

## 2025-04-14 DIAGNOSIS — K59.03 CONSTIPATION DUE TO OPIOID THERAPY: ICD-10-CM

## 2025-04-14 DIAGNOSIS — B37.2 YEAST DERMATITIS: ICD-10-CM

## 2025-04-14 DIAGNOSIS — T40.2X5A CONSTIPATION DUE TO OPIOID THERAPY: ICD-10-CM

## 2025-04-14 RX ORDER — NYSTATIN 100000 [USP'U]/G
POWDER TOPICAL
Qty: 120 G | Refills: 11 | Status: SHIPPED | OUTPATIENT
Start: 2025-04-14

## 2025-04-15 ENCOUNTER — TELEPHONE (OUTPATIENT)
Dept: FAMILY MEDICINE | Facility: CLINIC | Age: 75
End: 2025-04-15
Payer: MEDICARE

## 2025-04-15 NOTE — TELEPHONE ENCOUNTER
----- Message from Carissa sent at 4/15/2025 11:25 AM CDT -----  Contact: self  Type:  Patient Returning CallWho Called:pt Who Left Message for Patient:Does the patient know what this is regarding?:yesWould the patient rather a call back or a response via [facility]? callSonicPollen Call Back Number:[phone] Information: pt states she needs office to give her a call   Please call back to advise. Thanks!

## 2025-04-15 NOTE — TELEPHONE ENCOUNTER
----- Message from Kassi sent at 4/15/2025 11:16 AM CDT -----  Contact: pt  Type:  RX Refill RequestWho Called: ptRefill or New Rx: refillRX Name and Strength:lactulose (CHRONULAC) 20 gram/30 mL SolnHow is the patient currently taking it? (ex. 1XDay):as directedIs this a 30 day or 90 day RX: 90Preferred Pharmacy with phone number:[facility] [address] [facility] [id number] [place] [address]Ejkew: [phone] Fax: [phone] or Mail Order: localOrdering Provider: Ramon Would the patient rather a call back or a response via [facility]?  Call after sending Best Call Back Number: [phone] Information: please refill scriptThanks

## 2025-04-24 ENCOUNTER — TELEPHONE (OUTPATIENT)
Dept: PRIMARY CARE CLINIC | Facility: CLINIC | Age: 75
End: 2025-04-24
Payer: MEDICARE

## 2025-04-26 DIAGNOSIS — E11.51 TYPE 2 DIABETES MELLITUS WITH DIABETIC PERIPHERAL ANGIOPATHY WITHOUT GANGRENE, WITHOUT LONG-TERM CURRENT USE OF INSULIN: ICD-10-CM

## 2025-04-26 DIAGNOSIS — M51.369 LUMBAR DEGENERATIVE DISC DISEASE: ICD-10-CM

## 2025-04-28 RX ORDER — LIDOCAINE 50 MG/G
PATCH TOPICAL
Qty: 30 PATCH | Refills: 3 | Status: SHIPPED | OUTPATIENT
Start: 2025-04-28

## 2025-04-28 RX ORDER — TIRZEPATIDE 2.5 MG/.5ML
2.5 INJECTION, SOLUTION SUBCUTANEOUS
Qty: 12 PEN | Refills: 1 | Status: SHIPPED | OUTPATIENT
Start: 2025-04-28 | End: 2025-04-29

## 2025-04-28 NOTE — TELEPHONE ENCOUNTER
Refill Routing Note   Medication(s) are not appropriate for processing by [facility] for the following reason(s):        Outside of protocol  Non-participating provider    [facility] action(s):  Route               Appointments  past 12m or future 3m with PCP    Date Provider   Last Visit   10/22/2024 Jonh Navarro, NP   Next Visit   4/29/2025 Jonh Navarro, NP   ED visits in past 90 days: 0        Note composed:10:50 AM 04/28/2025

## 2025-04-29 ENCOUNTER — OFFICE VISIT (OUTPATIENT)
Dept: PRIMARY CARE CLINIC | Facility: CLINIC | Age: 75
End: 2025-04-29
Payer: MEDICARE

## 2025-04-29 VITALS
WEIGHT: 268.94 LBS | TEMPERATURE: 98 F | OXYGEN SATURATION: 94 % | BODY MASS INDEX: 40.76 KG/M2 | HEART RATE: 62 BPM | SYSTOLIC BLOOD PRESSURE: 110 MMHG | DIASTOLIC BLOOD PRESSURE: 62 MMHG | HEIGHT: 68 IN

## 2025-04-29 DIAGNOSIS — J44.9 CHRONIC OBSTRUCTIVE PULMONARY DISEASE, UNSPECIFIED COPD TYPE: ICD-10-CM

## 2025-04-29 DIAGNOSIS — E66.01 SEVERE OBESITY (BMI >= 40): ICD-10-CM

## 2025-04-29 DIAGNOSIS — I50.30 DIASTOLIC CHF WITH PRESERVED LEFT VENTRICULAR FUNCTION, NYHA CLASS 2: ICD-10-CM

## 2025-04-29 DIAGNOSIS — E78.5 HYPERLIPIDEMIA ASSOCIATED WITH TYPE 2 DIABETES MELLITUS: ICD-10-CM

## 2025-04-29 DIAGNOSIS — I15.2 HYPERTENSION ASSOCIATED WITH DIABETES: ICD-10-CM

## 2025-04-29 DIAGNOSIS — E66.01 OBESITY, MORBID, BMI 40.0-49.9: ICD-10-CM

## 2025-04-29 DIAGNOSIS — E11.59 HYPERTENSION ASSOCIATED WITH DIABETES: ICD-10-CM

## 2025-04-29 DIAGNOSIS — I50.42 CHRONIC COMBINED SYSTOLIC AND DIASTOLIC CHF (CONGESTIVE HEART FAILURE): ICD-10-CM

## 2025-04-29 DIAGNOSIS — M51.362 DEGENERATION OF INTERVERTEBRAL DISC OF LUMBAR REGION WITH DISCOGENIC BACK PAIN AND LOWER EXTREMITY PAIN: Primary | ICD-10-CM

## 2025-04-29 DIAGNOSIS — M51.16 INTERVERTEBRAL DISC DISORDERS WITH RADICULOPATHY, LUMBAR REGION: ICD-10-CM

## 2025-04-29 DIAGNOSIS — I48.20 CHRONIC ATRIAL FIBRILLATION: ICD-10-CM

## 2025-04-29 DIAGNOSIS — L03.311 ABDOMINAL WALL CELLULITIS: ICD-10-CM

## 2025-04-29 DIAGNOSIS — E11.51 TYPE 2 DIABETES MELLITUS WITH DIABETIC PERIPHERAL ANGIOPATHY WITHOUT GANGRENE, WITHOUT LONG-TERM CURRENT USE OF INSULIN: ICD-10-CM

## 2025-04-29 DIAGNOSIS — E11.69 HYPERLIPIDEMIA ASSOCIATED WITH TYPE 2 DIABETES MELLITUS: ICD-10-CM

## 2025-04-29 DIAGNOSIS — G89.4 CHRONIC PAIN SYNDROME: ICD-10-CM

## 2025-04-29 DIAGNOSIS — I50.22 CHRONIC SYSTOLIC CONGESTIVE HEART FAILURE: ICD-10-CM

## 2025-04-29 DIAGNOSIS — D50.8 IRON DEFICIENCY ANEMIA SECONDARY TO INADEQUATE DIETARY IRON INTAKE: ICD-10-CM

## 2025-04-29 PROCEDURE — 99999 PR PBB SHADOW E&M-EST. PATIENT-LVL V: CPT | Mod: PBBFAC,,, | Performed by: NURSE PRACTITIONER

## 2025-04-29 RX ORDER — HYDROCODONE BITARTRATE AND ACETAMINOPHEN 7.5; 325 MG/1; MG/1
1 TABLET ORAL EVERY 6 HOURS PRN
Qty: 90 TABLET | Refills: 0 | Status: SHIPPED | OUTPATIENT
Start: 2025-05-14 | End: 2025-06-13

## 2025-04-29 RX ORDER — TIRZEPATIDE 5 MG/.5ML
5 INJECTION, SOLUTION SUBCUTANEOUS
Qty: 2 ML | Refills: 0 | Status: SHIPPED | OUTPATIENT
Start: 2025-04-29 | End: 2025-05-29

## 2025-04-29 RX ORDER — HYDROCODONE BITARTRATE AND ACETAMINOPHEN 7.5; 325 MG/1; MG/1
1 TABLET ORAL EVERY 6 HOURS PRN
Qty: 90 TABLET | Refills: 0 | Status: CANCELLED | OUTPATIENT
Start: 2025-04-29

## 2025-04-29 RX ORDER — TIRZEPATIDE 2.5 MG/.5ML
2.5 INJECTION, SOLUTION SUBCUTANEOUS
Qty: 12 PEN | Refills: 1 | Status: CANCELLED | OUTPATIENT
Start: 2025-04-29

## 2025-04-29 RX ORDER — FUROSEMIDE 40 MG/1
40 TABLET ORAL DAILY
Qty: 90 TABLET | Refills: 1 | Status: SHIPPED | OUTPATIENT
Start: 2025-04-29

## 2025-04-29 RX ORDER — SPIRONOLACTONE 25 MG/1
25 TABLET ORAL
Qty: 36 TABLET | Refills: 0 | Status: SHIPPED | OUTPATIENT
Start: 2025-04-30

## 2025-04-29 NOTE — PROGRESS NOTES
Subjective:       Patient ID: Nelly Tian is a 74 y.o. female.    Chief Complaint: No chief complaint on file.    HPI      Patient presents today via virtual visit for follow up. Last visit with PCP- on 8/1/24. Labs reviewed  3/25. Patient request 3 months Norco refill. Patient has Home Health for wound care for abdominal cellulitis . Declines vaccins at this time.     11/24/24 ER visit: presents emergency department for evaluation of her wound and some episodes of shaking. She is concerned she is septic because she has experienced shaking before with this      5/6/24 infectious diseases-: Recurrent cellulitis, Panniculitis, Yeast dermatitis-cefadroxil, fluconazole, home health-PICC line     4/24/24 Hospital stay: admitted due to sepsis and abdominal wall cellulitis- Midline placed on 4/24, and ID advised IV Daptomycin 800 mg daily for 10 days through May 4th then stop   Past Medical History:   Diagnosis Date    *Atrial flutter     Angina pectoris 9/18/2017    Anxiety     Arthritis     Asthma     Atrial fibrillation     Back pain     Cataract     OD    Chest pain 9/17/2017    CHF (congestive heart failure)     Chronically on benzodiazepine therapy 5/4/2019    COPD (chronic obstructive pulmonary disease)     Depression     Diabetes mellitus     Emphysema of lung     Heart failure     Hepatomegaly 2/3/2016    Hernia     History of MI (myocardial infarction) 1/19/2016    Hypercapnic respiratory failure, chronic 11/16/2016    Hyperlipidemia     Hypertension     Iron deficiency anemia 2/3/2016    Myocardial infarction     Obesity     Peripheral vascular disease     Pneumonia     Polyneuropathy     Retinal detachment     OS    Septic shock 4/23/2017    Skin ulcer 3/18/2017    Tobacco dependence     Type II 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, not stated as uncontrolled(250.60)        Review of patient's allergies indicates:   Allergen Reactions    Dilaudid  "[hydromorphone] Anaphylaxis     Other reaction(s): Anaphylaxis  Other reaction(s): Unknown    Zyvox [linezolid in dextrose 5%] Shortness Of Breath       Current Medications[1]    Review of Systems   Constitutional:  Negative for unexpected weight change.   HENT:  Negative for trouble swallowing.    Eyes:  Negative for visual disturbance.   Respiratory:  Negative for shortness of breath.    Cardiovascular:  Negative for chest pain, palpitations and leg swelling.   Gastrointestinal:  Negative for blood in stool.   Genitourinary:  Negative for hematuria.   Musculoskeletal:  Positive for back pain and gait problem.   Skin:  Negative for rash.   Allergic/Immunologic: Negative for immunocompromised state.   Neurological:  Negative for headaches.   Hematological:  Does not bruise/bleed easily.   Psychiatric/Behavioral:  Negative for agitation. The patient is not nervous/anxious.        Objective:      /62 (BP Location: Left arm, Patient Position: Sitting)   Pulse 62   Temp 97.6 °F (36.4 °C) (Oral)   Ht 5' 8" (1.727 m)   Wt 122 kg (268 lb 15.4 oz)   LMP  (LMP Unknown)   SpO2 (!) 94%   BMI 40.90 kg/m²   Physical Exam  Constitutional:       Appearance: She is well-developed. She is obese.   HENT:      Head: Normocephalic.   Eyes:      Pupils: Pupils are equal, round, and reactive to light.   Cardiovascular:      Rate and Rhythm: Normal rate and regular rhythm.      Heart sounds: Normal heart sounds.   Pulmonary:      Effort: Pulmonary effort is normal.      Breath sounds: Normal breath sounds.   Abdominal:      General: Bowel sounds are normal. There is distension.      Palpations: Abdomen is soft.   Musculoskeletal:         General: Tenderness present.      Right shoulder: Tenderness present. Decreased range of motion.      Cervical back: Normal range of motion.      Lumbar back: Spasms and tenderness present. Decreased range of motion.      Comments: Ambulates with a walker   Skin:     General: Skin is warm and " dry.   Psychiatric:         Behavior: Behavior normal.         Thought Content: Thought content normal.         Judgment: Judgment normal.         Assessment:       1. Degeneration of intervertebral disc of lumbar region with discogenic back pain and lower extremity pain    2. Chronic systolic congestive heart failure    3. Diastolic CHF with preserved left ventricular function, NYHA class 2    4. Type 2 diabetes mellitus with diabetic peripheral angiopathy without gangrene, without long-term current use of insulin    5. Obesity, morbid, BMI 40.0-49.9    6. Hyperlipidemia associated with type 2 diabetes mellitus    7. Iron deficiency anemia secondary to inadequate dietary iron intake    8. Chronic pain syndrome    9. Chronic atrial fibrillation    10. Chronic combined systolic and diastolic CHF (congestive heart failure)    11. Hypertension associated with diabetes    12. Abdominal wall cellulitis    13. Chronic obstructive pulmonary disease, unspecified COPD type    14. Severe obesity (BMI >= 40)    15. Intervertebral disc disorders with radiculopathy, lumbar region        Plan:       Degeneration of intervertebral disc of lumbar region with discogenic back pain and lower extremity pain  -     HYDROcodone-acetaminophen (NORCO) 7.5-325 mg per tablet; Take 1 tablet by mouth every 6 (six) hours as needed for Pain.  Dispense: 90 tablet; Refill: 0  Due for a UDS.  checked-no inappropriate activity found  Chronic systolic congestive heart failure  -     spironolactone (ALDACTONE) 25 MG tablet; Take 1 tablet (25 mg total) by mouth 3 (three) times a week.  Dispense: 36 tablet; Refill: 0  -     furosemide (LASIX) 40 MG tablet; Take 1 tablet (40 mg total) by mouth once daily.  Dispense: 90 tablet; Refill: 1  Stable, last visit with Cardiology on 2/8/23  I have placed an referral    Type 2 diabetes mellitus with diabetic peripheral angiopathy without gangrene, without long-term current use of insulin  -     empagliflozin  (JARDIANCE) 25 mg tablet; Take 1 tablet (25 mg total) by mouth once daily.  Dispense: 90 tablet; Refill: 0  -     tirzepatide (MOUNJARO) 5 mg/0.5 mL PnIj; Inject 5 mg into the skin every 7 days.  Dispense: 2 mL; Refill: 0  -     Comprehensive Metabolic Panel; Future; Expected date: 04/29/2025  Stable, continue management  Follow the ADA diet  Hemoglobin A1C   Date Value Ref Range Status   03/17/2025 5.6 4.5 - 6.2 % Final     Comment:     ADA Screening Guidelines:  5.7-6.4%  Consistent with prediabetes  >or=6.5%  Consistent with diabetes    High levels of fetal hemoglobin interfere with the HbA1C  assay. Heterozygous hemoglobin variants (HbS, HgC, etc)do  not significantly interfere with this assay.   However, presence of multiple variants may affect accuracy.     08/05/2024 5.6 4.5 - 6.2 % Final     Comment:     ADA Screening Guidelines:  5.7-6.4%  Consistent with prediabetes  >or=6.5%  Consistent with diabetes    High levels of fetal hemoglobin interfere with the HbA1C  assay. Heterozygous hemoglobin variants (HbS, HgC, etc)do  not significantly interfere with this assay.   However, presence of multiple variants may affect accuracy.     05/20/2024 5.4 4.5 - 6.2 % Final     Comment:     ADA Screening Guidelines:  5.7-6.4%  Consistent with prediabetes  >or=6.5%  Consistent with diabetes    High levels of fetal hemoglobin interfere with the HbA1C  assay. Heterozygous hemoglobin variants (HbS, HgC, etc)do  not significantly interfere with this assay.   However, presence of multiple variants may affect accuracy.        Obesity, morbid, BMI 40.0-49.9  -     tirzepatide (MOUNJARO) 5 mg/0.5 mL PnIj; Inject 5 mg into the skin every 7 days.  Dispense: 2 mL; Refill: 0  Stable, continue management  Hyperlipidemia associated with type 2 diabetes mellitus  -     Lipid Panel; Future; Expected date: 04/29/2025  Stable, on Lipitor  Iron deficiency anemia secondary to inadequate dietary iron intake  -     Iron and TIBC; Future;  "Expected date: 04/29/2025  -     Ferritin; Future; Expected date: 04/29/2025  Stable, continue management  Chronic pain syndrome  Stable, due for UDS  Chronic atrial fibrillation  Stable, on Eluquis  Hypertension associated with diabetes  Stable, continue management  Low sodium diet  BP Readings from Last 3 Encounters:   04/29/25 110/62   03/11/25 120/60   11/24/24 (!) 123/56      Abdominal wall cellulitis  Stable, continue follow up  Chronic obstructive pulmonary disease, unspecified COPD type  Stable, continue resp inhaler  Severe obesity (BMI >= 40)  Counseled patient on his ideal body weight, health consequences of being obese and current recommendations including weekly exercise and a heart healthy diet.  Current BMI is:Estimated body mass index is 40.9 kg/m² as calculated from the following:    Height as of this encounter: 5' 8" (1.727 m).    Weight as of this encounter: 122 kg (268 lb 15.4 oz)..  Patient is aware that ideal BMI < 25 or Weight in (lb) to have BMI = 25: 164.1.           Patient readiness: acceptance and barriers:none    During the course of the visit the patient was educated and counseled about the following:     Diabetes:  Addressed ADA diet.  Suggested low cholesterol diet.  Encouraged aerobic exercise.  Hypertension:   Dietary sodium restriction.  Regular aerobic exercise.  Obesity:   General weight loss/lifestyle modification strategies discussed (elicit support from others; identify saboteurs; non-food rewards, etc).  Regular aerobic exercise program discussed.    Goals: Diabetes: Maintain Hemoglobin A1C below 7, Hypertension: Reduce Blood Pressure, and Obesity: Reduce calorie intake and BMI    Did patient meet goals/outcomes: Yes    The following self management tools provided: declined    Patient Instructions (the written plan) was given to the patient/family.     Time spent with patient: 30 minutes    Barriers to medications present (no )    Adverse reactions to current medications " (no)    Over the counter medications reviewed (Yes)               [1]   Current Outpatient Medications:     albuterol (PROVENTIL/VENTOLIN HFA) 90 mcg/actuation inhaler, Inhale 2 puffs into the lungs every 6 (six) hours as needed for Wheezing (Rescue). Rescue, Disp: 20.1 g, Rfl: 2    apixaban (ELIQUIS) 5 mg Tab, Take 1 tablet (5 mg total) by mouth 2 (two) times daily., Disp: 180 tablet, Rfl: 5    ascorbic acid, vitamin C, (VITAMIN C) 500 MG tablet, Take 2 tablets (1,000 mg total) by mouth every evening., Disp: , Rfl:     atorvastatin (LIPITOR) 10 MG tablet, Take 1 tablet (10 mg total) by mouth every other day., Disp: 45 tablet, Rfl: 0    blood sugar diagnostic Strp, To check BG 2 times daily, to use with insurance preferred meter, Disp: 100 each, Rfl: 5    blood-glucose meter kit, To check BG 2 times daily, to use with insurance preferred meter, Disp: 1 each, Rfl: 1    fluticasone propionate (FLONASE) 50 mcg/actuation nasal spray, 1 spray (50 mcg total) by Each Nostril route once daily. Shake Liquid and use, Disp: 48 g, Rfl: 3    fluticasone-umeclidin-vilanter (TRELEGY ELLIPTA) 100-62.5-25 mcg DsDv, Inhale 1 puff into the lungs Daily., Disp: 28 each, Rfl: 3    gabapentin (NEURONTIN) 800 MG tablet, Take 1 tablet (800 mg total) by mouth 3 (three) times daily., Disp: 90 tablet, Rfl: 11    HYDROcodone-acetaminophen (NORCO) 7.5-325 mg per tablet, Take 1 tablet by mouth every 6 (six) hours as needed for Pain., Disp: 90 tablet, Rfl: 0    hydrOXYzine HCL (ATARAX) 25 MG tablet, Take 1 tablet (25 mg total) by mouth nightly as needed for Itching., Disp: 30 tablet, Rfl: 11    Lactobacillus acidophilus 1 billion cell Cap, Take 1 capsule by mouth 3 (three) times daily with meals., Disp: 60 capsule, Rfl: 0    lactulose (CHRONULAC) 20 gram/30 mL Soln, Take 30 mLs (20 g total) by mouth 2 (two) times daily., Disp: 1800 mL, Rfl: 2    lancets Misc, To check BG 2 times daily, to use with insurance preferred meter, Disp: 100 each, Rfl:  5    LIDOcaine (LIDODERM) 5 %, APPLY PATCH ONTO THE SKIN. REMOVE AND DISCARD PATCH WITHIN 12 HOURS OR AS DIRECTED BY MD, Disp: 30 patch, Rfl: 3    lisinopriL (PRINIVIL,ZESTRIL) 20 MG tablet, Take 1 tablet (20 mg total) by mouth once daily., Disp: 90 tablet, Rfl: 2    melatonin 10 mg Tab, Take 10 mg by mouth nightly., Disp: , Rfl:     metFORMIN (GLUCOPHAGE) 500 MG tablet, Take 1 tablet (500 mg total) by mouth daily with breakfast., Disp: 90 tablet, Rfl: 0    multivitamin (THERAGRAN) tablet, Take 1 tablet by mouth once daily., Disp: , Rfl:     nystatin (NYSTOP) powder, APPLY TOPICALLY TO THE AFFECTED AREA TWICE DAILY, Disp: 120 g, Rfl: 11    nystatin-triamcinolone (MYCOLOG II) cream, Apply topically 3 (three) times daily. Apply to affected area, Disp: 30 g, Rfl: 3    ondansetron (ZOFRAN-ODT) 8 MG TbDL, Take 1 tablet (8 mg total) by mouth every 12 (twelve) hours as needed (nausea)., Disp: 40 tablet, Rfl: 3    potassium chloride SA (K-DUR,KLOR-CON) 20 MEQ tablet, Take 1 tablet (20 mEq total) by mouth once daily., Disp: 90 tablet, Rfl: 2    QUEtiapine (SEROQUEL) 25 MG Tab, Take 1 tablet (25 mg total) by mouth nightly., Disp: 30 tablet, Rfl: 3    albuterol-ipratropium (DUO-NEB) 2.5 mg-0.5 mg/3 mL nebulizer solution, Take 3 mLs by nebulization every 6 (six) hours as needed for Wheezing. Rescue, Disp: 75 mL, Rfl: 0    empagliflozin (JARDIANCE) 25 mg tablet, Take 1 tablet (25 mg total) by mouth once daily., Disp: 90 tablet, Rfl: 0    furosemide (LASIX) 40 MG tablet, Take 1 tablet (40 mg total) by mouth once daily., Disp: 90 tablet, Rfl: 1    [START ON 5/14/2025] HYDROcodone-acetaminophen (NORCO) 7.5-325 mg per tablet, Take 1 tablet by mouth every 6 (six) hours as needed for Pain., Disp: 90 tablet, Rfl: 0    [START ON 4/30/2025] spironolactone (ALDACTONE) 25 MG tablet, Take 1 tablet (25 mg total) by mouth 3 (three) times a week., Disp: 36 tablet, Rfl: 0    tirzepatide (MOUNJARO) 5 mg/0.5 mL PnIj, Inject 5 mg into the skin  every 7 days., Disp: 2 mL, Rfl: 0

## 2025-05-19 ENCOUNTER — PATIENT MESSAGE (OUTPATIENT)
Dept: FAMILY MEDICINE | Facility: CLINIC | Age: 75
End: 2025-05-19
Payer: MEDICARE

## 2025-05-21 ENCOUNTER — EXTERNAL HOME HEALTH (OUTPATIENT)
Dept: HOME HEALTH SERVICES | Facility: HOSPITAL | Age: 75
End: 2025-05-21
Payer: MEDICARE

## 2025-05-21 DIAGNOSIS — G47.00 INSOMNIA, UNSPECIFIED TYPE: ICD-10-CM

## 2025-05-21 RX ORDER — NYSTATIN AND TRIAMCINOLONE ACETONIDE 100000; 1 [USP'U]/G; MG/G
CREAM TOPICAL 3 TIMES DAILY
Qty: 30 G | Refills: 3 | Status: SHIPPED | OUTPATIENT
Start: 2025-05-21

## 2025-05-21 RX ORDER — QUETIAPINE FUMARATE 25 MG/1
25 TABLET, FILM COATED ORAL NIGHTLY
Qty: 30 TABLET | Refills: 3 | Status: SHIPPED | OUTPATIENT
Start: 2025-05-21 | End: 2025-09-18

## 2025-05-21 NOTE — TELEPHONE ENCOUNTER
----- Message from Sofia sent at 5/21/2025  8:58 AM CDT -----  Type:  Needs Medical Advice Who Called: Pt  Pharmacy name and phone #:  [facility] - [place] - [id number] [address]Sdhtc: [phone] Fax: [phone] Would the patient rather a call back or a response via [facility]? Call Back  Best Call Back Number: [phone] Additional Information: pt would like for NP Ramon to up her  QUEtiapine (SEROQUEL) 25 MG Tab dosage if possible   Please call Back to advise. Thanks!  
----- Message from Sofia sent at 5/21/2025  9:02 AM CDT -----  Type:  RX Refill Request Who Called: Pt  Refill or New Rx:Refill RX Name and Strength:nystatin-triamcinolone (MYCOLOG II) cream How is the patient currently taking it? (ex. 1XDay):NA Is this a 30 day or 90 day RX:NA Preferred Pharmacy with phone number[facility] [address] [facility] [id number] [address]Cpbnf: [phone] Fax: [phone]  Local or Mail Order:NA Ordering Provider:Ramon  Would the patient rather a call back or a response via [facility]? Call Back Best Call Back Number: [phone] Additional Information: Pt would like a refill on cream      Please call Back to advise. Thanks!  
LOV 04/29/25  LAB 04/29/25  Next OV 10/29/25  
No care due was identified.  [facility] Embedded Care Due Messages. Reference number: [id number].   5/21/2025 9:16:41 AM CDT  
detailed exam

## 2025-05-30 DIAGNOSIS — E66.01 OBESITY, MORBID, BMI 40.0-49.9: ICD-10-CM

## 2025-05-30 DIAGNOSIS — G47.00 INSOMNIA, UNSPECIFIED TYPE: ICD-10-CM

## 2025-05-30 DIAGNOSIS — E11.51 TYPE 2 DIABETES MELLITUS WITH DIABETIC PERIPHERAL ANGIOPATHY WITHOUT GANGRENE, WITHOUT LONG-TERM CURRENT USE OF INSULIN: ICD-10-CM

## 2025-05-30 RX ORDER — TIRZEPATIDE 5 MG/.5ML
5 INJECTION, SOLUTION SUBCUTANEOUS
Qty: 2 ML | Refills: 0 | Status: SHIPPED | OUTPATIENT
Start: 2025-05-30 | End: 2025-06-29

## 2025-05-30 RX ORDER — QUETIAPINE FUMARATE 25 MG/1
25 TABLET, FILM COATED ORAL NIGHTLY
Qty: 30 TABLET | Refills: 3 | Status: SHIPPED | OUTPATIENT
Start: 2025-05-30 | End: 2025-09-27

## 2025-06-02 DIAGNOSIS — G47.00 INSOMNIA, UNSPECIFIED TYPE: ICD-10-CM

## 2025-06-02 DIAGNOSIS — E66.01 OBESITY, MORBID, BMI 40.0-49.9: ICD-10-CM

## 2025-06-02 DIAGNOSIS — E11.51 TYPE 2 DIABETES MELLITUS WITH DIABETIC PERIPHERAL ANGIOPATHY WITHOUT GANGRENE, WITHOUT LONG-TERM CURRENT USE OF INSULIN: ICD-10-CM

## 2025-06-02 RX ORDER — TIRZEPATIDE 5 MG/.5ML
5 INJECTION, SOLUTION SUBCUTANEOUS
Qty: 2 ML | Refills: 0 | OUTPATIENT
Start: 2025-06-02 | End: 2025-07-02

## 2025-06-02 RX ORDER — QUETIAPINE FUMARATE 25 MG/1
25 TABLET, FILM COATED ORAL NIGHTLY
Qty: 30 TABLET | Refills: 3 | OUTPATIENT
Start: 2025-06-02 | End: 2025-09-30

## 2025-06-06 NOTE — PROGRESS NOTES
Progress Note  Infectious Disease    Follow-up For:  Anterior abdominal wall cellulitis    SUBJECTIVE:     ROS: No new complaints. Rt ant abdominal wall feels about the same, tender to touch    Antibiotics (From admission, onward)    Start     Stop Route Frequency Ordered    05/05/19 0300  vancomycin (VANCOCIN) 1,500 mg in dextrose 5 % 250 mL IVPB      -- IV Every 12 hours (non-standard times) 05/04/19 1511    05/04/19 0600  clindamycin 600 MG/50 ML D5W 600 mg/50 mL IVPB 600 mg      -- IV Every 6 hours (non-standard times) 05/04/19 0100        Antifungals (From admission, onward)    Start     Stop Route Frequency Ordered    05/05/19 0923  fluconazole tablet 200 mg      -- Oral Daily 05/05/19 0923        Pertinent Medications noted:    EXAM & DIAGNOSTICS REVIEWED:   Vitals:     Temp:  [97 °F (36.1 °C)-102.3 °F (39.1 °C)]   Temp: 97.1 °F (36.2 °C) (05/05/19 0733)  Pulse: 67 (05/05/19 0817)  Resp: 18 (05/05/19 0817)  BP: 135/61 (05/05/19 0733)  SpO2: (!) 92 % (05/05/19 0817)    Intake/Output Summary (Last 24 hours) at 5/5/2019 0930  Last data filed at 5/4/2019 2009  Gross per 24 hour   Intake 580 ml   Output 1400 ml   Net -820 ml       General:  In NAD.   Eyes:  Anicteric, PERRL, EOMI  ENT:  Mouth w/ pink MMM, no lesions/exudate,    Ok  dentition  Neck:  Trachea midline, supple, no adenopathy appreciated  Lungs: clear   Heart:  RRR, no gallop/murmur noted  Abd:  soft, NT, ND, normal BS, no masses/organomegaly appreciated.  :  Voids/Harding draining yellow urine, clear  Musc:  Joints without effusion, erythema, synovitis,   Skin:  Generally warm, dry, normal for color. No rashes  Wound: Several small wounds to lateral abdomen, same erythema, less nontender. Groin is moist, fungal rash  Neuro: AAOx3, speech clear, moves all extrems equally  Extrem: No edema, erythema, phlebitis, cellulitis, synovitis  VAD:      Lines/Tubes/Drains:    General Labs reviewed:  Recent Labs   Lab 05/05/19  0452   WBC 13.10*   RBC 4.25   HGB  12.4   HCT 38.7   *   MCV 91   MCH 29.2   MCHC 32.2     Recent Labs   Lab 05/05/19  0452   CALCIUM 9.1   *   K 4.0   CO2 27      BUN 11   CREATININE 0.6       Micro:  Microbiology Results (last 7 days)     Procedure Component Value Units Date/Time    Blood culture [[id number]] Collected:  05/04/19 1140    Order Status:  Completed Specimen:  Blood Updated:  05/05/19 0115     Blood Culture, Routine No Growth to date    Blood culture [[id number]] Collected:  05/04/19 1140    Order Status:  Completed Specimen:  Blood Updated:  05/05/19 0115     Blood Culture, Routine No Growth to date    Blood culture x two cultures. Draw prior to antibiotics. [[id number]] Collected:  05/03/19 2310    Order Status:  Completed Specimen:  Blood from Peripheral, Left  Hand Updated:  05/04/19 1745     Blood Culture, Routine No Growth to date    Narrative:       Aerobic and anaerobic    Blood culture x two cultures. Draw prior to antibiotics. [[id number]] Collected:  05/03/19 2253    Order Status:  Completed Specimen:  Blood from Peripheral, Right  Hand Updated:  05/04/19 1745     Blood Culture, Routine No Growth to date    Narrative:       Aerobic and anaerobic    Blood culture [[id number]]     Order Status:  Canceled Specimen:  Blood     Blood culture [[id number]]     Order Status:  Canceled Specimen:  Blood           Imaging Reviewed:  There is prominent subcutaneous edema and skin thickening observed in the area of clinical concern over the left lower quadrant abdominal wall compatible with the provided history of panniculitis/cellulitis.  No abscess appreciated.    ASSESSMENT & PLAN      Patient Active Problem List   Diagnosis    Diabetes mellitus with neuropathy    Long term current use of anticoagulant therapy    Major depression, recurrent, chronic    GERD (gastroesophageal reflux disease)    Tobacco dependency    Chronic atrial fibrillation    Morbid obesity with BMI of 40.0-44.9, adult    Hypertension  associated with diabetes    PVD (peripheral vascular disease)    Abdominal aortic atherosclerosis    Dependency on pain medication    Iron deficiency anemia    DDD (degenerative disc disease), lumbar    Type 2 diabetes mellitus with diabetic neuropathy, without long-term current use of insulin    Hyperlipidemia associated with type 2 diabetes mellitus    NSAID long-term use    Mixed restrictive and obstructive lung disease    Hypercapnic respiratory failure, chronic    COPD (chronic obstructive pulmonary disease)    Cellulitis, abdominal wall    CHF (congestive heart failure)    Hepatomegaly    Chronic combined systolic and diastolic CHF (congestive heart failure)    Decubitus ulcer    Type 2 diabetes mellitus, without long-term current use of insulin    Retinal detachment of left eye with multiple breaks    Chronic pain syndrome    Wound, open, abdominal wall, lateral    Other nonthrombocytopenic purpura    Abdominal wall cellulitis    Encephalopathy, metabolic    Chronically on benzodiazepine therapy    Chronic prescription opiate use       . Panniculitis of LL abdominal wall                 Procal 0.56              Fungal infection of groin    Morbid obesity    Recommendation:   Cont vanc and Clinda for panniculitis  Cont diflucan for fungal inf of groin  Monitor CRP  Us= no abscess today  Ibuprofen OTC x 2 days     negative

## 2025-06-11 ENCOUNTER — TELEPHONE (OUTPATIENT)
Dept: FAMILY MEDICINE | Facility: CLINIC | Age: 75
End: 2025-06-11
Payer: MEDICARE

## 2025-06-11 ENCOUNTER — E-VISIT (OUTPATIENT)
Dept: FAMILY MEDICINE | Facility: CLINIC | Age: 75
End: 2025-06-11
Payer: MEDICARE

## 2025-06-11 DIAGNOSIS — M54.9 BACK PAIN, UNSPECIFIED BACK LOCATION, UNSPECIFIED BACK PAIN LATERALITY, UNSPECIFIED CHRONICITY: Primary | ICD-10-CM

## 2025-06-11 DIAGNOSIS — Z12.11 COLON CANCER SCREENING: ICD-10-CM

## 2025-06-11 DIAGNOSIS — E11.40 TYPE 2 DIABETES MELLITUS WITH DIABETIC NEUROPATHY, WITHOUT LONG-TERM CURRENT USE OF INSULIN: ICD-10-CM

## 2025-06-11 DIAGNOSIS — M51.362 DEGENERATION OF INTERVERTEBRAL DISC OF LUMBAR REGION WITH DISCOGENIC BACK PAIN AND LOWER EXTREMITY PAIN: ICD-10-CM

## 2025-06-11 RX ORDER — HYDROCODONE BITARTRATE AND ACETAMINOPHEN 7.5; 325 MG/1; MG/1
1 TABLET ORAL EVERY 6 HOURS PRN
Qty: 90 TABLET | Refills: 0 | Status: CANCELLED | OUTPATIENT
Start: 2025-06-11 | End: 2025-07-11

## 2025-06-11 RX ORDER — HYDROCODONE BITARTRATE AND ACETAMINOPHEN 7.5; 325 MG/1; MG/1
1 TABLET ORAL EVERY 8 HOURS PRN
Qty: 60 TABLET | Refills: 0 | Status: SHIPPED | OUTPATIENT
Start: 2025-06-11 | End: 2025-07-11

## 2025-06-11 NOTE — PROGRESS NOTES
Patient ID: Nelly Tian is a 74 y.o. female.        E-Visit Time Tracking:   Day 1 Time (in minutes): 13  Total Time (in minutes): 13  I am covering for Dr. Bird who is out.     Chief Complaint: Medication Management (Entered automatically based on patient selection in [facility].)      The patient initiated a request through [facility] on 6/11/2025 for evaluation and management with a chief complaint of Medication Management (Entered automatically based on patient selection in [facility].)     I evaluated the questionnaire submission on 06/11/2025.    [facility] Evisit Medication    6/11/2025  5:12 PM CDT - Filed by Patient   Do you agree to participate in an E-Visit? Yes   If you have any of the following symptoms, please present to your local emergency room or call 911:  I acknowledge   Medication requests for narcotics will not be addressed via an E-Visit.  Please schedule an appointment. I acknowledge   Choose the state of your primary residence [place]   Do you want to address a new or existing medication? Address a current medication   What is the main issue you would like addressed today? Hydrocodone - apas 7.5- 325   Would you like to change or continue your medication? Continue medication   What is the name of the medication you would like to continue? Hydrocodone 7.5- 325   Are you taking your medication as prescribed? Yes   Which option below best describes the reason for your request? Renew refills    What medical condition is the  medication intended to treat? Ulsurs on my side and arthritis on my back   Has the medication helped your condition? Yes   Have you experienced any side effects from the medication? No   Provide any additional information you feel is important.    Please attach any relevant images or files    Are you able to take your vital signs? Yes   Systolic Blood Pressure: 116   Diastolic Blood Pressure: 65   Weight: 275   Height: 574   Pulse: 64   Temperature: 97.8   Respiration rate:  62   Pulse Oxygen: 95         Encounter Diagnoses   Name Primary?    Back pain, unspecified back location, unspecified back pain laterality, unspecified chronicity Yes    Degeneration of intervertebral disc of lumbar region with discogenic back pain and lower extremity pain     Type 2 diabetes mellitus with diabetic neuropathy, without long-term current use of insulin     Colon cancer screening         Orders Placed This Encounter   Procedures    Diabetic Eye Screening Photo     Standing Status:   Future     Expiration Date:   6/11/2026     Release to patient:   Immediate    Case Request Endoscopy: COLONOSCOPY     Medical Necessity::   Medically Non-Urgent [100]     Case Referring Provider:   JUANJOSE DEE [[id number]]     Is an on-site pathologist required for this procedure?:   N/A     Is the patient currently on anticoagulants and/or antiplatelets?:   Yes (please see anticoagulant/antiplatelet management document on left side of order)      Medications Ordered This Encounter   Medications    HYDROcodone-acetaminophen (NORCO) 7.5-325 mg per tablet     Sig: Take 1 tablet by mouth every 8 (eight) hours as needed for Pain.     Dispense:  60 tablet     Refill:  0     Quantity prescribed more than 7 day supply? Yes, quantity medically necessary, patient needs to return to clinic.     I have reviewed the Prescription Drug Monitoring Program (PDMP) database for this patient prior to prescribing the above opioid medication:   Yes      Patient taking medication which helps her ambulate and do activities of daily living such as spend time and play with family and help her with many other activities.     The patient is meeting the goals of opioid therapy and is dependent on them for functionality and can not perform ADLS without them. Regarding opioids, the risks were discussed including tolerance, addiction, overdose, over-sedation, drug interactions, liver damage, hormone imbalance, dental decay, opioid-induced  hyperalgesia, and even death. I cautioned the patient that the medications that are being taken are dangerous and can result in death from overdose. They can also result in death if taken by someone else without medical supervision. I also warned the patient to not drive or operate heavy machinery while taking these medications. I discussed the use of narcan if needed and she verbalized understanding. The patient agreed to teach a family member living in the same home on how and when to use it if the scenario of a drug overdose occurs. Shared decision making occurred and she agreed with this treatment plan.        was reviewed today (06/11/2025) and determined to be appropriate for continued opioid therapy.      No follow-ups on file.

## 2025-06-11 NOTE — TELEPHONE ENCOUNTER
Copied from CRM #[id number]. Topic: General Inquiry - Patient Advice  >> Jun 11, 2025  3:10 PM Lisa wrote:  Type: Needs Medical Advice    Who Called: PT  Best Call Back Number: [phone]  Additional  Information: Patient asking for call back regarding her Norco prescription being filled.. Said she is unable to come in due to the ulcers on her side and transportation.  Please Advise- Thank you

## 2025-06-12 ENCOUNTER — PATIENT MESSAGE (OUTPATIENT)
Dept: GASTROENTEROLOGY | Facility: CLINIC | Age: 75
End: 2025-06-12
Payer: MEDICARE

## 2025-06-13 DIAGNOSIS — G47.00 INSOMNIA, UNSPECIFIED TYPE: ICD-10-CM

## 2025-06-13 DIAGNOSIS — J44.9 CHRONIC OBSTRUCTIVE PULMONARY DISEASE, UNSPECIFIED COPD TYPE: ICD-10-CM

## 2025-06-13 DIAGNOSIS — K59.03 CONSTIPATION DUE TO OPIOID THERAPY: ICD-10-CM

## 2025-06-13 DIAGNOSIS — I50.30 DIASTOLIC CHF WITH PRESERVED LEFT VENTRICULAR FUNCTION, NYHA CLASS 2: ICD-10-CM

## 2025-06-13 DIAGNOSIS — E11.59 HYPERTENSION ASSOCIATED WITH DIABETES: ICD-10-CM

## 2025-06-13 DIAGNOSIS — E11.51 TYPE 2 DIABETES MELLITUS WITH DIABETIC PERIPHERAL ANGIOPATHY WITHOUT GANGRENE, WITHOUT LONG-TERM CURRENT USE OF INSULIN: ICD-10-CM

## 2025-06-13 DIAGNOSIS — E66.01 OBESITY, MORBID, BMI 40.0-49.9: ICD-10-CM

## 2025-06-13 DIAGNOSIS — Z51.81 THERAPEUTIC DRUG MONITORING: ICD-10-CM

## 2025-06-13 DIAGNOSIS — M51.369 LUMBAR DEGENERATIVE DISC DISEASE: ICD-10-CM

## 2025-06-13 DIAGNOSIS — I15.2 HYPERTENSION ASSOCIATED WITH DIABETES: ICD-10-CM

## 2025-06-13 DIAGNOSIS — T40.2X5A CONSTIPATION DUE TO OPIOID THERAPY: ICD-10-CM

## 2025-06-13 DIAGNOSIS — J96.11 CHRONIC RESPIRATORY FAILURE WITH HYPOXIA: ICD-10-CM

## 2025-06-13 DIAGNOSIS — I50.22 CHRONIC SYSTOLIC CONGESTIVE HEART FAILURE: ICD-10-CM

## 2025-06-16 RX ORDER — FLUTICASONE FUROATE, UMECLIDINIUM BROMIDE AND VILANTEROL TRIFENATATE 100; 62.5; 25 UG/1; UG/1; UG/1
1 POWDER RESPIRATORY (INHALATION) DAILY
Qty: 28 EACH | Refills: 3 | Status: SHIPPED | OUTPATIENT
Start: 2025-06-16 | End: 2026-06-16

## 2025-06-16 RX ORDER — QUETIAPINE FUMARATE 25 MG/1
25 TABLET, FILM COATED ORAL NIGHTLY
Qty: 30 TABLET | Refills: 3 | Status: SHIPPED | OUTPATIENT
Start: 2025-06-16 | End: 2025-10-14

## 2025-06-16 RX ORDER — SPIRONOLACTONE 25 MG/1
25 TABLET ORAL
Qty: 36 TABLET | Refills: 0 | Status: SHIPPED | OUTPATIENT
Start: 2025-06-16

## 2025-06-16 RX ORDER — GABAPENTIN 800 MG/1
800 TABLET ORAL 3 TIMES DAILY
Qty: 90 TABLET | Refills: 11 | Status: SHIPPED | OUTPATIENT
Start: 2025-06-16

## 2025-06-16 RX ORDER — METFORMIN HYDROCHLORIDE 500 MG/1
500 TABLET ORAL
Qty: 90 TABLET | Refills: 0 | Status: SHIPPED | OUTPATIENT
Start: 2025-06-16

## 2025-06-16 RX ORDER — TIRZEPATIDE 5 MG/.5ML
5 INJECTION, SOLUTION SUBCUTANEOUS
Qty: 2 ML | Refills: 0 | Status: SHIPPED | OUTPATIENT
Start: 2025-06-16 | End: 2025-07-16

## 2025-06-16 RX ORDER — LIDOCAINE 50 MG/G
PATCH TOPICAL
Qty: 30 PATCH | Refills: 3 | Status: SHIPPED | OUTPATIENT
Start: 2025-06-16

## 2025-06-16 RX ORDER — ATORVASTATIN CALCIUM 10 MG/1
10 TABLET, FILM COATED ORAL EVERY OTHER DAY
Qty: 45 TABLET | Refills: 0 | Status: SHIPPED | OUTPATIENT
Start: 2025-06-16

## 2025-06-17 ENCOUNTER — TELEPHONE (OUTPATIENT)
Dept: GASTROENTEROLOGY | Facility: CLINIC | Age: 75
End: 2025-06-17
Payer: MEDICARE

## 2025-06-23 DIAGNOSIS — E11.51 TYPE 2 DIABETES MELLITUS WITH DIABETIC PERIPHERAL ANGIOPATHY WITHOUT GANGRENE, WITHOUT LONG-TERM CURRENT USE OF INSULIN: ICD-10-CM

## 2025-06-23 DIAGNOSIS — J44.9 CHRONIC OBSTRUCTIVE PULMONARY DISEASE, UNSPECIFIED COPD TYPE: ICD-10-CM

## 2025-06-23 DIAGNOSIS — N25.81 HYPERPARATHYROIDISM, SECONDARY RENAL: ICD-10-CM

## 2025-06-23 DIAGNOSIS — J96.11 CHRONIC RESPIRATORY FAILURE WITH HYPOXIA: ICD-10-CM

## 2025-06-23 DIAGNOSIS — I50.30 DIASTOLIC CHF WITH PRESERVED LEFT VENTRICULAR FUNCTION, NYHA CLASS 2: ICD-10-CM

## 2025-06-23 DIAGNOSIS — E66.01 OBESITY, MORBID, BMI 40.0-49.9: ICD-10-CM

## 2025-06-23 DIAGNOSIS — I50.22 CHRONIC SYSTOLIC CONGESTIVE HEART FAILURE: ICD-10-CM

## 2025-06-23 RX ORDER — IPRATROPIUM BROMIDE AND ALBUTEROL SULFATE 2.5; .5 MG/3ML; MG/3ML
3 SOLUTION RESPIRATORY (INHALATION) EVERY 6 HOURS PRN
Qty: 75 ML | Refills: 0 | Status: SHIPPED | OUTPATIENT
Start: 2025-06-23 | End: 2026-06-23

## 2025-06-23 NOTE — TELEPHONE ENCOUNTER
Care Due:                  Date            Visit Type   Department     Provider  --------------------------------------------------------------------------------                                ESTABLISHED                              PATIENT -  Last Visit: 03-      VIRTUAL      None Found     Constantine Bird  Next Visit: None Scheduled  None         None Found                                                            Last  Test          Frequency    Reason                     Performed    Due Date  --------------------------------------------------------------------------------    HBA1C.......  6 months...  empagliflozin, metFORMIN,   03- 09-                             tirzepatide..............    [employer] Embedded Care Due Messages. Reference number: [id number].   6/23/2025 11:41:37 AM CDT

## 2025-06-24 ENCOUNTER — EXTERNAL HOME HEALTH (OUTPATIENT)
Dept: HOME HEALTH SERVICES | Facility: HOSPITAL | Age: 75
End: 2025-06-24
Payer: MEDICARE

## 2025-06-24 ENCOUNTER — DOCUMENT SCAN (OUTPATIENT)
Dept: HOME HEALTH SERVICES | Facility: HOSPITAL | Age: 75
End: 2025-06-24
Payer: MEDICARE

## 2025-06-24 RX ORDER — FLUTICASONE FUROATE, UMECLIDINIUM BROMIDE AND VILANTEROL TRIFENATATE 100; 62.5; 25 UG/1; UG/1; UG/1
1 POWDER RESPIRATORY (INHALATION) DAILY
Qty: 28 EACH | Refills: 3 | Status: SHIPPED | OUTPATIENT
Start: 2025-06-24 | End: 2026-06-24

## 2025-06-24 RX ORDER — SPIRONOLACTONE 25 MG/1
25 TABLET ORAL
Qty: 36 TABLET | Refills: 0 | Status: SHIPPED | OUTPATIENT
Start: 2025-06-25

## 2025-06-24 RX ORDER — TIRZEPATIDE 5 MG/.5ML
5 INJECTION, SOLUTION SUBCUTANEOUS
Qty: 2 ML | Refills: 0 | Status: SHIPPED | OUTPATIENT
Start: 2025-06-24 | End: 2025-07-24

## 2025-06-30 ENCOUNTER — TELEPHONE (OUTPATIENT)
Dept: FAMILY MEDICINE | Facility: CLINIC | Age: 75
End: 2025-06-30
Payer: MEDICARE

## 2025-06-30 ENCOUNTER — E-VISIT (OUTPATIENT)
Dept: PRIMARY CARE CLINIC | Facility: CLINIC | Age: 75
End: 2025-06-30
Payer: MEDICARE

## 2025-06-30 DIAGNOSIS — R05.8 COUGH PRODUCTIVE OF PURULENT SPUTUM: Primary | ICD-10-CM

## 2025-06-30 RX ORDER — PROMETHAZINE HYDROCHLORIDE AND DEXTROMETHORPHAN HYDROBROMIDE 6.25; 15 MG/5ML; MG/5ML
5 SYRUP ORAL 4 TIMES DAILY PRN
Qty: 118 ML | Refills: 1 | Status: SHIPPED | OUTPATIENT
Start: 2025-06-30 | End: 2025-07-12

## 2025-06-30 RX ORDER — METHYLPREDNISOLONE 4 MG/1
TABLET ORAL
Qty: 21 EACH | Refills: 0 | Status: SHIPPED | OUTPATIENT
Start: 2025-06-30 | End: 2025-07-21

## 2025-06-30 RX ORDER — AZITHROMYCIN 250 MG/1
TABLET, FILM COATED ORAL
Qty: 6 TABLET | Refills: 0 | Status: SHIPPED | OUTPATIENT
Start: 2025-06-30 | End: 2025-07-05

## 2025-06-30 NOTE — TELEPHONE ENCOUNTER
Copied from CRM #[id number]. Topic: General Inquiry - Status Check  >> Jun 30, 2025  3:12 PM Josefina wrote:  Type:  Needs Medical Advice    Who Called: [facility] [phone]  Additional Information: Needing status on prior auth for diabetic supplies please call and advise.Thank you.

## 2025-06-30 NOTE — TELEPHONE ENCOUNTER
Copied from CRM #[id number]. Topic: Medications - New Medication Request  >> Jun 30, 2025  8:04 AM Skye wrote:  Type:  RX Request    Who Called: Pt  Refill or New Rx:New  RX Name and Strength: Antibiotic  Preferred Pharmacy with phone number:    [facility] - [facility] [address] [facility] [address]  [address] [address] [address]  Phone: [phone] Fax: [phone]     Local or Mail Order:Local  Ordering Provider:Dr Navarro  Would the patient rather a call back or a response via [facility]? Call  Best Call Back Number:[phone]   Additional Information:   Pt asks for an antibiotic she is not feeling well

## 2025-06-30 NOTE — TELEPHONE ENCOUNTER
Copied from CRM #[id number]. Topic: General Inquiry - Patient Advice  >> Jun 30, 2025 10:01 AM Vanita wrote:  Type:  Needs Medical Advice    Who Called:   Patient  Symptoms (please be specific):   Cold     Pharmacy name and phone #:        [facility] - [place] - [address]  [address] [facility] [address]  [facility] [address]  Phone: [phone] Fax: [phone]    Would the patient rather a call back or a response via [facility]?   Call back  Best Call Back Number:   [phone]    Additional Information:   States she would like to speak with someone - states she would like an antibiotic prescription sent in - please call - thank you

## 2025-06-30 NOTE — TELEPHONE ENCOUNTER
Spoke with patient.   She is c/o chest congestion and sinus congestion with pain.    Denies fever, shortness of breath, or wheezing.   She is doing breathing treatments.   Requesting antibiotics and cough medication.   She does not want to get pneumonia

## 2025-06-30 NOTE — PROGRESS NOTES
Patient ID: Nelly Tian is a 74 y.o. female.        E-Visit Time Tracking:   Day 1 Time (in minutes): 20  Total Time (in minutes): 20      Chief Complaint: General Illness (Entered automatically based on patient selection in [facility].)      The patient initiated a request through [facility] on 6/30/2025 for evaluation and management with a chief complaint of General Illness (Entered automatically based on patient selection in [facility] [facility].)     I evaluated the questionnaire submission on 06/30/2025.    [facility] Evisit Supergroup-Cough And Cold    6/30/2025  3:05 PM CDT - Filed by Patient   What do you need help with? Cough, Cold, Sore Throat   Do you agree to participate in an E-Visit? Yes   If you have any of the following symptoms, go to your local emergency room or call 911: I acknowledge   What is the main issue you would like addressed today? Cold   Do you think you might have COVID-19 or the Flu? (COVID-19, Flu, No, Not sure) No   What symptoms do you currently have?(Chills, Cough, Diarrhea, Fatigue, Headache, Stuffy nose, Loss of taste or smell, Muscle or body aches, Nausea, Runny nose, Sore throat, Face and nose pain, Ear pain, Neck pain, None) Cough;  Sore throat   Describe your cough:(Contains mucus, Comes and goes, Dry, Does not stop, Interferes with daily activities ) Contains mucus   Describe your mucus:(Bloody, Green, Yellow, Clear, White, Foamy, Thick, Thin, Foul smelling) Yellow   Have you had any trouble with your breathing, swollowing, or vision?(Swallowing, Breathing, Vision, None) None   Have you ever smoked?(Smoked in the past, Currently smoke, Never smoked) Never smoked   What has been the range of your fever?(Below 100.4, 100.4 or higher, Not sure) No   When did your concern begin? 6/29/2025   In the last two weeks, have you been in close contact with someone who has COVID-19, the Flu, or strep throat? No   What have you tried to help your symptoms? Cold medication;  Drinking more fluids   On  a scale of 1-10, where 10 is the worst you can imagine, how severe are your symptoms? (range: 1 - 10) 6   Have your symptoms changed since they first started?(Improved, Worsened, No change) No change   Do you have transportation to an [facility] location to get tested for COVID-19? No   Provide any additional information you feel is important.    Please attach any relevant images or files    Are you able to take your vitals? Yes   Systolic Blood Pressure 116   Diastolic Blood Pressure 92   Weight: 259   Height: 58   Pluse: 63   Temp 99   Resp:    SpO2 97         Encounter Diagnosis   Name Primary?    Cough productive of purulent sputum Yes        No orders of the defined types were placed in this encounter.     Medications Ordered This Encounter   Medications    azithromycin (Z-MURTAZA) 250 MG tablet     Sig: Take 2 tablets by mouth on day 1; Take 1 tablet by mouth on days 2-5     Dispense:  6 tablet     Refill:  0    methylPREDNISolone (MEDROL DOSEPACK) 4 mg tablet     Sig: use as directed     Dispense:  21 each     Refill:  0    promethazine-dextromethorphan (PROMETHAZINE-DM) 6.25-15 mg/5 mL Syrp     Sig: Take 5 mLs by mouth 4 (four) times daily as needed (cough).     Dispense:  118 mL     Refill:  1        No follow-ups on file.

## 2025-07-01 NOTE — PROGRESS NOTES
Health Maintenance       DTaP/Tdap/Td Vaccine (1 - Tdap)  Overdue since 11/19/2006    COVID-19 Vaccine (4 - 2024-25 season)  Overdue since 9/1/2024    Breast Cancer Screening (Every 2 Years)  Due since 6/13/2025           Following review of the above:  Patient is not proceeding with: Mammogram, COVID-19, and Dtap/Tdap/Td    Note: Refer to final orders and clinician documentation.         Spoke with pt gave dosing and next inr check date pt voiced understanding.   hh faxed.

## 2025-07-06 DIAGNOSIS — I50.22 CHRONIC SYSTOLIC CONGESTIVE HEART FAILURE: ICD-10-CM

## 2025-07-06 DIAGNOSIS — M51.362 DEGENERATION OF INTERVERTEBRAL DISC OF LUMBAR REGION WITH DISCOGENIC BACK PAIN AND LOWER EXTREMITY PAIN: ICD-10-CM

## 2025-07-06 DIAGNOSIS — I50.30 DIASTOLIC CHF WITH PRESERVED LEFT VENTRICULAR FUNCTION, NYHA CLASS 2: ICD-10-CM

## 2025-07-06 RX ORDER — SPIRONOLACTONE 25 MG/1
25 TABLET ORAL
Qty: 36 TABLET | Refills: 0 | Status: CANCELLED | OUTPATIENT
Start: 2025-07-07

## 2025-07-07 ENCOUNTER — TELEPHONE (OUTPATIENT)
Dept: FAMILY MEDICINE | Facility: CLINIC | Age: 75
End: 2025-07-07
Payer: MEDICARE

## 2025-07-07 RX ORDER — HYDROCODONE BITARTRATE AND ACETAMINOPHEN 7.5; 325 MG/1; MG/1
1 TABLET ORAL EVERY 6 HOURS PRN
Qty: 90 TABLET | Refills: 0 | Status: SHIPPED | OUTPATIENT
Start: 2025-07-11 | End: 2026-07-11

## 2025-07-07 RX ORDER — SPIRONOLACTONE 25 MG/1
25 TABLET ORAL
Qty: 36 TABLET | Refills: 0 | Status: SHIPPED | OUTPATIENT
Start: 2025-07-07

## 2025-07-07 NOTE — TELEPHONE ENCOUNTER
I spoke with pt via phone. I advised her that a message was sent to Mrs. Navarro. No further questions at this time.     Copied from CRM #[id number]. Topic: General Inquiry - Patient Advice  >> Jul 7, 2025 10:46 AM Kellen wrote:  Type:  Needs Medical Advice    Who Called: pt     Would the patient rather a call back or a response via [facility]? Call back   Best Call Back Number: [phone]    Additional Information: pt is requesting a call back from nurse regarding her refills on medications please call back

## 2025-07-07 NOTE — TELEPHONE ENCOUNTER
Appt booked for 07/22/2025. No further questions. Time, and date confirmed.       Copied from CRM #[id number]. Topic: Appointments - Appointment Access  >> Jul 7, 2025 11:47 AM Luanne wrote:  Type:  Sooner Appointment Request  Name of Caller: Pt   When is the first available appointment? N/A  Symptoms: Well Check   Would the patient rather a call back or a response via [facility]?  Call   Best Call Back Number: [phone]  Pt is requesting a Tues or Thurs appt and not too early in the morning... Please call to advise... Thank you...   6 (moderate pain)

## 2025-07-07 NOTE — TELEPHONE ENCOUNTER
Patient needs a office visit -last visit with me on 4/29/25-visit every 3 months . Medication sent

## 2025-07-09 ENCOUNTER — TELEPHONE (OUTPATIENT)
Dept: PRIMARY CARE CLINIC | Facility: CLINIC | Age: 75
End: 2025-07-09
Payer: MEDICARE

## 2025-07-09 NOTE — TELEPHONE ENCOUNTER
Per pt disregard pt does not use [facility] at all. Will disregard.      Copied from CRM #[id number]. Topic: Medications - Medication Authorization  >> Jul 9, 2025 10:55 AM Toma wrote:  Lucia from [facility] in regards to pa for diabetic supplies please call [phone]

## 2025-07-22 ENCOUNTER — OFFICE VISIT (OUTPATIENT)
Dept: PRIMARY CARE CLINIC | Facility: CLINIC | Age: 75
End: 2025-07-22
Payer: MEDICARE

## 2025-07-22 VITALS
SYSTOLIC BLOOD PRESSURE: 118 MMHG | WEIGHT: 259 LBS | HEART RATE: 67 BPM | OXYGEN SATURATION: 94 % | BODY MASS INDEX: 39.25 KG/M2 | HEIGHT: 68 IN | DIASTOLIC BLOOD PRESSURE: 62 MMHG

## 2025-07-22 DIAGNOSIS — E66.01 OBESITY, MORBID, BMI 40.0-49.9: ICD-10-CM

## 2025-07-22 DIAGNOSIS — E11.51 TYPE 2 DIABETES MELLITUS WITH DIABETIC PERIPHERAL ANGIOPATHY WITHOUT GANGRENE, WITHOUT LONG-TERM CURRENT USE OF INSULIN: ICD-10-CM

## 2025-07-22 DIAGNOSIS — I48.20 CHRONIC ATRIAL FIBRILLATION: ICD-10-CM

## 2025-07-22 DIAGNOSIS — E11.59 HYPERTENSION ASSOCIATED WITH DIABETES: ICD-10-CM

## 2025-07-22 DIAGNOSIS — E11.69 HYPERLIPIDEMIA ASSOCIATED WITH TYPE 2 DIABETES MELLITUS: ICD-10-CM

## 2025-07-22 DIAGNOSIS — M51.362 DEGENERATION OF INTERVERTEBRAL DISC OF LUMBAR REGION WITH DISCOGENIC BACK PAIN AND LOWER EXTREMITY PAIN: ICD-10-CM

## 2025-07-22 DIAGNOSIS — E78.5 HYPERLIPIDEMIA ASSOCIATED WITH TYPE 2 DIABETES MELLITUS: ICD-10-CM

## 2025-07-22 DIAGNOSIS — G47.00 INSOMNIA, UNSPECIFIED TYPE: ICD-10-CM

## 2025-07-22 DIAGNOSIS — I15.2 HYPERTENSION ASSOCIATED WITH DIABETES: ICD-10-CM

## 2025-07-22 DIAGNOSIS — I50.30 DIASTOLIC CHF WITH PRESERVED LEFT VENTRICULAR FUNCTION, NYHA CLASS 2: ICD-10-CM

## 2025-07-22 DIAGNOSIS — D50.8 IRON DEFICIENCY ANEMIA SECONDARY TO INADEQUATE DIETARY IRON INTAKE: ICD-10-CM

## 2025-07-22 DIAGNOSIS — G89.4 CHRONIC PAIN SYNDROME: Primary | ICD-10-CM

## 2025-07-22 DIAGNOSIS — F33.9 MAJOR DEPRESSION, RECURRENT, CHRONIC: ICD-10-CM

## 2025-07-22 PROCEDURE — 1159F MED LIST DOCD IN RCRD: CPT | Mod: CPTII,S$GLB,, | Performed by: NURSE PRACTITIONER

## 2025-07-22 PROCEDURE — 3061F NEG MICROALBUMINURIA REV: CPT | Mod: CPTII,S$GLB,, | Performed by: NURSE PRACTITIONER

## 2025-07-22 PROCEDURE — 99999 PR PBB SHADOW E&M-EST. PATIENT-LVL V: CPT | Mod: PBBFAC,,, | Performed by: NURSE PRACTITIONER

## 2025-07-22 PROCEDURE — 3074F SYST BP LT 130 MM HG: CPT | Mod: CPTII,S$GLB,, | Performed by: NURSE PRACTITIONER

## 2025-07-22 PROCEDURE — 1160F RVW MEDS BY RX/DR IN RCRD: CPT | Mod: CPTII,S$GLB,, | Performed by: NURSE PRACTITIONER

## 2025-07-22 PROCEDURE — 1101F PT FALLS ASSESS-DOCD LE1/YR: CPT | Mod: CPTII,S$GLB,, | Performed by: NURSE PRACTITIONER

## 2025-07-22 PROCEDURE — 3008F BODY MASS INDEX DOCD: CPT | Mod: CPTII,S$GLB,, | Performed by: NURSE PRACTITIONER

## 2025-07-22 PROCEDURE — 1123F ACP DISCUSS/DSCN MKR DOCD: CPT | Mod: CPTII,S$GLB,, | Performed by: NURSE PRACTITIONER

## 2025-07-22 PROCEDURE — 3288F FALL RISK ASSESSMENT DOCD: CPT | Mod: CPTII,S$GLB,, | Performed by: NURSE PRACTITIONER

## 2025-07-22 PROCEDURE — 3044F HG A1C LEVEL LT 7.0%: CPT | Mod: CPTII,S$GLB,, | Performed by: NURSE PRACTITIONER

## 2025-07-22 PROCEDURE — 3066F NEPHROPATHY DOC TX: CPT | Mod: CPTII,S$GLB,, | Performed by: NURSE PRACTITIONER

## 2025-07-22 PROCEDURE — 4010F ACE/ARB THERAPY RXD/TAKEN: CPT | Mod: CPTII,S$GLB,, | Performed by: NURSE PRACTITIONER

## 2025-07-22 PROCEDURE — 99214 OFFICE O/P EST MOD 30 MIN: CPT | Mod: S$GLB,,, | Performed by: NURSE PRACTITIONER

## 2025-07-22 PROCEDURE — 3078F DIAST BP <80 MM HG: CPT | Mod: CPTII,S$GLB,, | Performed by: NURSE PRACTITIONER

## 2025-07-22 RX ORDER — SPIRONOLACTONE 25 MG/1
25 TABLET ORAL
Qty: 36 TABLET | Refills: 0 | Status: SHIPPED | OUTPATIENT
Start: 2025-07-23

## 2025-07-22 RX ORDER — FUROSEMIDE 40 MG/1
40 TABLET ORAL DAILY
Qty: 90 TABLET | Refills: 1 | Status: SHIPPED | OUTPATIENT
Start: 2025-07-22

## 2025-07-22 RX ORDER — HYDROCODONE BITARTRATE AND ACETAMINOPHEN 7.5; 325 MG/1; MG/1
1 TABLET ORAL EVERY 6 HOURS PRN
Qty: 90 TABLET | Refills: 0 | Status: SHIPPED | OUTPATIENT
Start: 2025-08-11 | End: 2025-09-10

## 2025-07-22 RX ORDER — TIRZEPATIDE 5 MG/.5ML
5 INJECTION, SOLUTION SUBCUTANEOUS
Qty: 2 ML | Refills: 0 | Status: CANCELLED | OUTPATIENT
Start: 2025-07-22 | End: 2025-08-21

## 2025-07-22 RX ORDER — HYDROCODONE BITARTRATE AND ACETAMINOPHEN 7.5; 325 MG/1; MG/1
1 TABLET ORAL EVERY 6 HOURS PRN
Qty: 90 TABLET | Refills: 0 | Status: SHIPPED | OUTPATIENT
Start: 2025-10-11 | End: 2025-11-10

## 2025-07-22 RX ORDER — METFORMIN HYDROCHLORIDE 500 MG/1
500 TABLET ORAL
Qty: 90 TABLET | Refills: 0 | Status: SHIPPED | OUTPATIENT
Start: 2025-07-22

## 2025-07-22 RX ORDER — POTASSIUM CHLORIDE 20 MEQ/1
20 TABLET, EXTENDED RELEASE ORAL DAILY
Qty: 90 TABLET | Refills: 2 | Status: SHIPPED | OUTPATIENT
Start: 2025-07-22

## 2025-07-22 RX ORDER — QUETIAPINE FUMARATE 50 MG/1
50 TABLET, FILM COATED ORAL NIGHTLY
Qty: 30 TABLET | Refills: 11 | Status: SHIPPED | OUTPATIENT
Start: 2025-07-22 | End: 2026-07-22

## 2025-07-22 RX ORDER — HYDROCODONE BITARTRATE AND ACETAMINOPHEN 7.5; 325 MG/1; MG/1
1 TABLET ORAL EVERY 6 HOURS PRN
Qty: 90 TABLET | Refills: 0 | Status: SHIPPED | OUTPATIENT
Start: 2025-09-11 | End: 2025-10-11

## 2025-07-22 NOTE — PROGRESS NOTES
Subjective:       Patient ID: Nelly Tian is a 74 y.o. female.    Chief Complaint: Follow-up    Follow-up  Pertinent negatives include no chest pain, headaches or rash.     Patient presents today with daughter for follow up. Last visit with PCP- on 24. Labs reviewed  3/25. Patient request 3 months Norco refill. Patient has Home Health for wound care for abdominal cellulitis . Declines vaccins at this time.     24 ER visit: presents emergency department for evaluation of her wound and some episodes of shaking. She is concerned she is septic because she has experienced shaking before with this      24 infectious diseases-: Recurrent cellulitis, Panniculitis, Yeast dermatitis-cefadroxil, fluconazole, home health-PICC line     24 Hospital stay: admitted due to sepsis and abdominal wall cellulitis- Midline placed on , and ID advised IV Daptomycin 800 mg daily for 10 days through May 4th then stop     4Ms for Medical Decision-Making in Older Adults    Last Completed EAWV: 2022    MEDICATIONS:  High Risk Medications:  Total Active Medications: 2  gabapentin - 800 MG  HYDROcodone-acetaminophen - 7.5-325 mg, 7.5-325 mg, 7.5-325 mg  QUEtiapine - 50 MG    MOBILITY:  Activities of Daily Livin/28/2022     3:07 PM   Activities of Daily Living   Ambulation Independent   Dressing Independent   Transfers Independent   Toileting Continent of bladder;Continent of bowel   Feeding Independent   Cleaning home/Chores Independent   Telephone use Independent   Shopping Independent   Paying bills Independent   Taking meds Independent     Fall Risk:      2025     8:30 AM 2025    10:30 AM 2024    10:00 AM   Fall Risk Assessment - Outpatient   Mobility Status Ambulatory Ambulatory Ambulatory   Number of falls 0 0 0   Identified as fall risk False False False     Disability Status:      2023     9:44 AM   Disability Status   Are you deaf or do you have serious  difficulty hearing? N   Are you blind or do you have serious difficulty seeing, even when wearing glasses? N   Because of a physical, mental, or emotional condition, do you have serious difficulty concentrating, remembering, or making decisions? N   Do you have serious difficulty walking or climbing stairs? Y   Do you have difficulty dressing or bathing? N   Because of a physical, mental, or emotional condition, do you have difficulty doing errands alone such as visiting a doctor's office or shopping? Y     Nutrition Screenin/28/2022     3:07 PM   Nutrition Screening   Has food intake declined over the past three months due to loss of appetite, digestive problems, chewing or swallowing difficulties? No decrease in food intake   Involuntary weight loss during the last 3 months? No weight loss   Mobility? Goes out   Has the patient suffered psychological stress or acute disease in the past three months? No   Neuropsychological problems? No psychological problems   Body Mass Index (BMI)?  BMI 23 or greater   Screening Score 14   Interpretation Normal nutritional status    Screening Score: 0-7 Malnourished, 8-11 At Risk, 12-14 Normal  Get Up and Go:      2018    11:43 AM 2017    12:31 PM   Get Up and Go   Trial 1 13 seconds 15 seconds     Whisper Test:      2018    11:38 AM   Whisper Test   Whisper Test Normal           MENTATION:   Has Dementia Dx: No  Has Anxiety Dx: No    Depression Patient Health Questionnaire:      2025    10:48 AM   Depression Patient Health Questionnaire   Over the last two weeks how often have you been bothered by little interest or pleasure in doing things Not at all   Over the last two weeks how often have you been bothered by feeling down, depressed or hopeless Not at all   PHQ-2 Total Score 0     Cognitive Function Screenin/28/2022     3:10 PM   Cognitive Function Screening   Mini-Cog 3 Minute Recall 3   Cognitive Function Screening 5     Cognitive  Function Screening Total - Less than 4 = Abnormal,  Greater than or equal to 4 = Normal        WHAT MATTERS MOST:  Advance Care Planning   ACP Status:   Patient has had an ACP conversation  Living Will: No  Power of : No  POLST: No        Power of   I initiated the process of voluntary advance care planning today and explained the importance of this process to the patient.  I introduced the concept of advance directives to the patient, as well. Then the patient received detailed information about the importance of designating a Health Care Power of  (HCPOA). She was also instructed to communicate with this person about their wishes for future healthcare, should she become sick and lose decision-making capacity. The patient has previously appointed a HCPOA. After our discussion, the patient has decided to complete a HCPOA and has appointed her daughter, health care agent: Maye Tirado. I encouraged her to communicate with this person about their wishes for future healthcare, should she become sick and lose decision-making capacity.      A total of 30 min was spent on advance care planning, goals of care discussion, emotional support, formulating and communicating prognosis and exploring burden/benefit of various approaches of treatment. This discussion occurred on a fully voluntary basis with the verbal consent of the patient and/or family.          Past Medical History:   Diagnosis Date    *Atrial flutter     Angina pectoris 9/18/2017    Anxiety     Arthritis     Asthma     Atrial fibrillation     Back pain     Cataract     OD    Chest pain 9/17/2017    CHF (congestive heart failure)     Chronically on benzodiazepine therapy 5/4/2019    COPD (chronic obstructive pulmonary disease)     Depression     Diabetes mellitus     Emphysema of lung     Heart failure     Hepatomegaly 2/3/2016    Hernia     History of MI (myocardial infarction) 1/19/2016    Hypercapnic respiratory failure, chronic  "11/16/2016    Hyperlipidemia     Hypertension     Iron deficiency anemia 2/3/2016    Myocardial infarction     Obesity     Peripheral vascular disease     Pneumonia     Polyneuropathy     Retinal detachment     OS    Septic shock 4/23/2017    Skin ulcer 3/18/2017    Tobacco dependence     Type II 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, not stated as uncontrolled(250.60)        Review of patient's allergies indicates:   Allergen Reactions    Dilaudid [hydromorphone] Anaphylaxis     Other reaction(s): Anaphylaxis  Other reaction(s): Unknown    Zyvox [linezolid in dextrose 5%] Shortness Of Breath       Current Medications[1]    Review of Systems   Constitutional:  Negative for unexpected weight change.   HENT:  Negative for trouble swallowing.    Eyes:  Negative for visual disturbance.   Respiratory:  Negative for shortness of breath.    Cardiovascular:  Negative for chest pain, palpitations and leg swelling.   Gastrointestinal:  Negative for blood in stool.   Genitourinary:  Negative for hematuria.   Skin:  Negative for rash.   Allergic/Immunologic: Negative for immunocompromised state.   Neurological:  Negative for headaches.   Hematological:  Does not bruise/bleed easily.   Psychiatric/Behavioral:  Negative for agitation. The patient is not nervous/anxious.        Objective:      /62 (BP Location: Left arm, Patient Position: Sitting)   Pulse 67   Ht 5' 8" (1.727 m)   Wt 117.5 kg (259 lb)   LMP  (LMP Unknown)   SpO2 (!) 94%   BMI 39.38 kg/m²   Physical Exam  Constitutional:       Appearance: She is well-developed.   Eyes:      Pupils: Pupils are equal, round, and reactive to light.   Cardiovascular:      Rate and Rhythm: Normal rate and regular rhythm.      Heart sounds: Normal heart sounds.   Pulmonary:      Effort: Pulmonary effort is normal.      Breath sounds: Normal breath sounds.   Abdominal:      General: Bowel sounds are normal.      Palpations: Abdomen is soft. "          Comments: Dressing dry and intact   Musculoskeletal:         General: Normal range of motion.      Cervical back: Normal range of motion.   Skin:     General: Skin is warm and dry.   Neurological:      Mental Status: She is alert and oriented to person, place, and time.   Psychiatric:         Behavior: Behavior normal.         Thought Content: Thought content normal.         Judgment: Judgment normal.         Assessment:       1. Chronic pain syndrome    2. Degeneration of intervertebral disc of lumbar region with discogenic back pain and lower extremity pain    3. Type 2 diabetes mellitus with diabetic peripheral angiopathy without gangrene, without long-term current use of insulin    4. Hyperlipidemia associated with type 2 diabetes mellitus    5. Chronic atrial fibrillation    6. Diastolic CHF with preserved left ventricular function, NYHA class 2    7. Iron deficiency anemia secondary to inadequate dietary iron intake    8. Insomnia, unspecified type    9. Obesity, morbid, BMI 40.0-49.9    10. Hypertension associated with diabetes    11. Major depression, recurrent, chronic        Plan:       Chronic pain syndrome  -     HYDROcodone-acetaminophen (NORCO) 7.5-325 mg per tablet; Take 1 tablet by mouth every 6 (six) hours as needed for Pain.  Dispense: 90 tablet; Refill: 0  -     HYDROcodone-acetaminophen (NORCO) 7.5-325 mg per tablet; Take 1 tablet by mouth every 6 (six) hours as needed for Pain.  Dispense: 90 tablet; Refill: 0  -     HYDROcodone-acetaminophen (NORCO) 7.5-325 mg per tablet; Take 1 tablet by mouth every 6 (six) hours as needed for Pain.  Dispense: 90 tablet; Refill: 0  -     Ambulatory referral/consult to Addiction Psychiatry; Future; Expected date: 07/29/2025   checked-no inappropriate activity found   Degeneration of intervertebral disc of lumbar region with discogenic back pain and lower extremity pain  -     HYDROcodone-acetaminophen (NORCO) 7.5-325 mg per tablet; Take 1 tablet by  mouth every 6 (six) hours as needed for Pain.  Dispense: 90 tablet; Refill: 0  -     HYDROcodone-acetaminophen (NORCO) 7.5-325 mg per tablet; Take 1 tablet by mouth every 6 (six) hours as needed for Pain.  Dispense: 90 tablet; Refill: 0  -     HYDROcodone-acetaminophen (NORCO) 7.5-325 mg per tablet; Take 1 tablet by mouth every 6 (six) hours as needed for Pain.  Dispense: 90 tablet; Refill: 0  -     Ambulatory referral/consult to Addiction Psychiatry; Future; Expected date: 07/29/2025   checked-no inappropriate activity found   Type 2 diabetes mellitus with diabetic peripheral angiopathy without gangrene, without long-term current use of insulin  -     potassium chloride SA (K-DUR,KLOR-CON) 20 MEQ tablet; Take 1 tablet (20 mEq total) by mouth once daily.  Dispense: 90 tablet; Refill: 2  -     metFORMIN (GLUCOPHAGE) 500 MG tablet; Take 1 tablet (500 mg total) by mouth daily with breakfast.  Dispense: 90 tablet; Refill: 0  -     tirzepatide 7.5 mg/0.5 mL PnIj; Inject 7.5 mg into the skin every 7 days.  Dispense: 2 mL; Refill: 0  Stable, continue management  Follow the ADA diet  Hemoglobin A1C   Date Value Ref Range Status   03/17/2025 5.6 4.5 - 6.2 % Final     Comment:     ADA Screening Guidelines:  5.7-6.4%  Consistent with prediabetes  >or=6.5%  Consistent with diabetes    High levels of fetal hemoglobin interfere with the HbA1C  assay. Heterozygous hemoglobin variants (HbS, HgC, etc)do  not significantly interfere with this assay.   However, presence of multiple variants may affect accuracy.     08/05/2024 5.6 4.5 - 6.2 % Final     Comment:     ADA Screening Guidelines:  5.7-6.4%  Consistent with prediabetes  >or=6.5%  Consistent with diabetes    High levels of fetal hemoglobin interfere with the HbA1C  assay. Heterozygous hemoglobin variants (HbS, HgC, etc)do  not significantly interfere with this assay.   However, presence of multiple variants may affect accuracy.     05/20/2024 5.4 4.5 - 6.2 % Final      "Comment:     ADA Screening Guidelines:  5.7-6.4%  Consistent with prediabetes  >or=6.5%  Consistent with diabetes    High levels of fetal hemoglobin interfere with the HbA1C  assay. Heterozygous hemoglobin variants (HbS, HgC, etc)do  not significantly interfere with this assay.   However, presence of multiple variants may affect accuracy.        Hyperlipidemia associated with type 2 diabetes mellitus  Stable, on Lipitor  Chronic atrial fibrillation  Stable, on Eliquis  Diastolic CHF with preserved left ventricular function, NYHA class 2  -     spironolactone (ALDACTONE) 25 MG tablet; Take 1 tablet (25 mg total) by mouth 3 (three) times a week.  Dispense: 36 tablet; Refill: 0  -     furosemide (LASIX) 40 MG tablet; Take 1 tablet (40 mg total) by mouth once daily.  Dispense: 90 tablet; Refill: 1  Stable, continue management  Iron deficiency anemia secondary to inadequate dietary iron intake  Stable, continue management  Insomnia, unspecified type  -     QUEtiapine (SEROQUEL) 50 MG tablet; Take 1 tablet (50 mg total) by mouth every evening.  Dispense: 30 tablet; Refill: 11  Patient reports not sleeping-I increased medication  Obesity, morbid, BMI 40.0-49.9  Counseled patient on his ideal body weight, health consequences of being obese and current recommendations including weekly exercise and a heart healthy diet.  Current BMI is:Estimated body mass index is 39.38 kg/m² as calculated from the following:    Height as of this encounter: 5' 8" (1.727 m).    Weight as of this encounter: 117.5 kg (259 lb)..  Patient is aware that ideal BMI < 25 or Weight in (lb) to have BMI = 25: 164.1.     Major depression, recurrent, chronic  Stable, continue management        Patient readiness: acceptance and barriers:none    During the course of the visit the patient was educated and counseled about the following:     Diabetes:  Discussed general issues about diabetes pathophysiology and management.  Addressed ADA diet.  Suggested low " cholesterol diet.  Encouraged aerobic exercise.  Discussed foot care.  Hypertension:   Dietary sodium restriction.  Regular aerobic exercise.  Obesity:   General weight loss/lifestyle modification strategies discussed (elicit support from others; identify saboteurs; non-food rewards, etc).  Regular aerobic exercise program discussed.    Goals: Diabetes: Maintain Hemoglobin A1C below 7, Hypertension: Reduce Blood Pressure, and Obesity: Reduce calorie intake and BMI    Did patient meet goals/outcomes: Yes    The following self management tools provided: declined    Patient Instructions (the written plan) was given to the patient/family.     Time spent with patient: 30 minutes    Barriers to medications present (no )    Adverse reactions to current medications (no)    Over the counter medications reviewed (Yes)               [1]   Current Outpatient Medications:     albuterol (PROVENTIL/VENTOLIN HFA) 90 mcg/actuation inhaler, Inhale 2 puffs into the lungs every 6 (six) hours as needed for Wheezing (Rescue). Rescue, Disp: 20.1 g, Rfl: 2    albuterol-ipratropium (DUO-NEB) 2.5 mg-0.5 mg/3 mL nebulizer solution, Take 3 mLs by nebulization every 6 (six) hours as needed for Wheezing. Rescue, Disp: 75 mL, Rfl: 0    apixaban (ELIQUIS) 5 mg Tab, Take 1 tablet (5 mg total) by mouth 2 (two) times daily., Disp: 180 tablet, Rfl: 5    ascorbic acid, vitamin C, (VITAMIN C) 500 MG tablet, Take 2 tablets (1,000 mg total) by mouth every evening., Disp: , Rfl:     atorvastatin (LIPITOR) 10 MG tablet, Take 1 tablet (10 mg total) by mouth every other day., Disp: 45 tablet, Rfl: 0    blood sugar diagnostic Strp, To check BG 2 times daily, to use with insurance preferred meter, Disp: 100 each, Rfl: 5    empagliflozin (JARDIANCE) 25 mg tablet, Take 1 tablet (25 mg total) by mouth once daily., Disp: 90 tablet, Rfl: 0    fluticasone propionate (FLONASE) 50 mcg/actuation nasal spray, 1 spray (50 mcg total) by Each Nostril route once daily. Shake  Liquid and use, Disp: 48 g, Rfl: 3    fluticasone-umeclidin-vilanter (TRELEGY ELLIPTA) 100-62.5-25 mcg DsDv, Inhale 1 puff into the lungs Daily., Disp: 28 each, Rfl: 3    gabapentin (NEURONTIN) 800 MG tablet, Take 1 tablet (800 mg total) by mouth 3 (three) times daily., Disp: 90 tablet, Rfl: 11    Lactobacillus acidophilus 1 billion cell Cap, Take 1 capsule by mouth 3 (three) times daily with meals., Disp: 60 capsule, Rfl: 0    lactulose (CHRONULAC) 20 gram/30 mL Soln, Take 30 mLs (20 g total) by mouth 2 (two) times daily., Disp: 1800 mL, Rfl: 2    lancets Misc, To check BG 2 times daily, to use with insurance preferred meter, Disp: 100 each, Rfl: 5    LIDOcaine (LIDODERM) 5 %, APPLY PATCH ONTO THE SKIN. REMOVE AND DISCARD PATCH WITHIN 12 HOURS OR AS DIRECTED BY MD, Disp: 30 patch, Rfl: 3    lisinopriL (PRINIVIL,ZESTRIL) 20 MG tablet, Take 1 tablet (20 mg total) by mouth once daily., Disp: 90 tablet, Rfl: 2    melatonin 10 mg Tab, Take 10 mg by mouth nightly., Disp: , Rfl:     multivitamin (THERAGRAN) tablet, Take 1 tablet by mouth once daily., Disp: , Rfl:     nystatin (NYSTOP) powder, APPLY TOPICALLY TO THE AFFECTED AREA TWICE DAILY, Disp: 120 g, Rfl: 11    nystatin-triamcinolone (MYCOLOG II) cream, Apply topically 3 (three) times daily. Apply to affected area, Disp: 30 g, Rfl: 3    tirzepatide (MOUNJARO) 5 mg/0.5 mL PnIj, Inject 5 mg into the skin every 7 days., Disp: 2 mL, Rfl: 0    blood-glucose meter kit, To check BG 2 times daily, to use with insurance preferred meter, Disp: 1 each, Rfl: 1    furosemide (LASIX) 40 MG tablet, Take 1 tablet (40 mg total) by mouth once daily., Disp: 90 tablet, Rfl: 1    [START ON 9/11/2025] HYDROcodone-acetaminophen (NORCO) 7.5-325 mg per tablet, Take 1 tablet by mouth every 6 (six) hours as needed for Pain., Disp: 90 tablet, Rfl: 0    [START ON 10/11/2025] HYDROcodone-acetaminophen (NORCO) 7.5-325 mg per tablet, Take 1 tablet by mouth every 6 (six) hours as needed for Pain.,  Disp: 90 tablet, Rfl: 0    [START ON 8/11/2025] HYDROcodone-acetaminophen (NORCO) 7.5-325 mg per tablet, Take 1 tablet by mouth every 6 (six) hours as needed for Pain., Disp: 90 tablet, Rfl: 0    hydrOXYzine HCL (ATARAX) 25 MG tablet, Take 1 tablet (25 mg total) by mouth nightly as needed for Itching. (Patient not taking: Reported on 7/22/2025), Disp: 30 tablet, Rfl: 11    metFORMIN (GLUCOPHAGE) 500 MG tablet, Take 1 tablet (500 mg total) by mouth daily with breakfast., Disp: 90 tablet, Rfl: 0    potassium chloride SA (K-DUR,KLOR-CON) 20 MEQ tablet, Take 1 tablet (20 mEq total) by mouth once daily., Disp: 90 tablet, Rfl: 2    QUEtiapine (SEROQUEL) 50 MG tablet, Take 1 tablet (50 mg total) by mouth every evening., Disp: 30 tablet, Rfl: 11    spironolactone (ALDACTONE) 25 MG tablet, Take 1 tablet (25 mg total) by mouth 3 (three) times a week., Disp: 36 tablet, Rfl: 0    tirzepatide 7.5 mg/0.5 mL PnIj, Inject 7.5 mg into the skin every 7 days., Disp: 2 mL, Rfl: 0

## 2025-07-23 ENCOUNTER — TELEPHONE (OUTPATIENT)
Dept: PRIMARY CARE CLINIC | Facility: CLINIC | Age: 75
End: 2025-07-23
Payer: MEDICARE

## 2025-07-23 NOTE — TELEPHONE ENCOUNTER
Attempted to contact Lucia at the pharmacy, unable to reach. Left voicemail to return phone call.     Copied from CRM #[id number]. Topic: Medications - Medication Status Check   >> Jul 23, 2025  1:22 PM Toma wrote:  Lucia from [employer] calling for pa form that was faxed over on 07/09 and 07/16 please call to discuss [phone]

## 2025-07-24 ENCOUNTER — TELEPHONE (OUTPATIENT)
Dept: PRIMARY CARE CLINIC | Facility: CLINIC | Age: 75
End: 2025-07-24
Payer: MEDICARE

## 2025-07-24 DIAGNOSIS — G47.00 INSOMNIA, UNSPECIFIED TYPE: Primary | ICD-10-CM

## 2025-07-24 NOTE — TELEPHONE ENCOUNTER
I spoke with pt via phone. She states that she is unable to take seroquel 50 mg. She states that she was up itching all night. She states that she would like another medication in place of it. Please advise, thank you !         Copied from CRM #[id number]. Topic: General Inquiry - Patient Advice  >> Jul 24, 2025  9:29 AM Emilia wrote:  Type: Needs Medical Advice  Who Called:  pt     Best Call Back Number: [phone]    Additional Information: pt requesting call back in regard to medication please advise

## 2025-07-25 RX ORDER — DOXEPIN 3 MG/1
3 TABLET, FILM COATED ORAL NIGHTLY
Qty: 30 TABLET | Refills: 0 | Status: SHIPPED | OUTPATIENT
Start: 2025-07-25

## 2025-07-30 ENCOUNTER — DOCUMENT SCAN (OUTPATIENT)
Dept: HOME HEALTH SERVICES | Facility: HOSPITAL | Age: 75
End: 2025-07-30
Payer: MEDICARE

## 2025-08-05 DIAGNOSIS — E11.51 TYPE 2 DIABETES MELLITUS WITH DIABETIC PERIPHERAL ANGIOPATHY WITHOUT GANGRENE, WITHOUT LONG-TERM CURRENT USE OF INSULIN: ICD-10-CM

## 2025-08-05 RX ORDER — INSULIN PUMP SYRINGE, 3 ML
EACH MISCELLANEOUS
Qty: 1 EACH | Refills: 1 | Status: SHIPPED | OUTPATIENT
Start: 2025-08-05 | End: 2026-08-05

## 2025-08-05 RX ORDER — NYSTATIN AND TRIAMCINOLONE ACETONIDE 100000; 1 [USP'U]/G; MG/G
CREAM TOPICAL 3 TIMES DAILY
Qty: 30 G | Refills: 3 | Status: SHIPPED | OUTPATIENT
Start: 2025-08-05

## 2025-08-05 NOTE — TELEPHONE ENCOUNTER
No care due was identified.  [facility] Embedded Care Due Messages. Reference number: [id number].   8/05/2025 9:28:59 AM CDT

## 2025-08-07 ENCOUNTER — TELEPHONE (OUTPATIENT)
Dept: FAMILY MEDICINE | Facility: CLINIC | Age: 75
End: 2025-08-07
Payer: MEDICARE

## 2025-08-07 DIAGNOSIS — E11.59 HYPERTENSION ASSOCIATED WITH DIABETES: ICD-10-CM

## 2025-08-07 DIAGNOSIS — I10 HYPERTENSION, UNSPECIFIED TYPE: ICD-10-CM

## 2025-08-07 DIAGNOSIS — E11.51 TYPE 2 DIABETES MELLITUS WITH DIABETIC PERIPHERAL ANGIOPATHY WITHOUT GANGRENE, WITHOUT LONG-TERM CURRENT USE OF INSULIN: ICD-10-CM

## 2025-08-07 DIAGNOSIS — M51.369 LUMBAR DEGENERATIVE DISC DISEASE: ICD-10-CM

## 2025-08-07 DIAGNOSIS — B37.2 YEAST DERMATITIS: ICD-10-CM

## 2025-08-07 DIAGNOSIS — Z51.81 THERAPEUTIC DRUG MONITORING: ICD-10-CM

## 2025-08-07 DIAGNOSIS — I15.2 HYPERTENSION ASSOCIATED WITH DIABETES: ICD-10-CM

## 2025-08-07 DIAGNOSIS — G47.00 INSOMNIA, UNSPECIFIED TYPE: ICD-10-CM

## 2025-08-07 RX ORDER — DOXEPIN 3 MG/1
3 TABLET, FILM COATED ORAL NIGHTLY
Qty: 30 TABLET | Refills: 0 | Status: CANCELLED | OUTPATIENT
Start: 2025-08-07

## 2025-08-07 RX ORDER — NYSTATIN AND TRIAMCINOLONE ACETONIDE 100000; 1 [USP'U]/G; MG/G
CREAM TOPICAL 3 TIMES DAILY
Qty: 30 G | Refills: 3 | Status: CANCELLED | OUTPATIENT
Start: 2025-08-07

## 2025-08-07 RX ORDER — GABAPENTIN 800 MG/1
800 TABLET ORAL 3 TIMES DAILY
Qty: 90 TABLET | Refills: 11 | Status: CANCELLED | OUTPATIENT
Start: 2025-08-07

## 2025-08-07 RX ORDER — LIDOCAINE 50 MG/G
PATCH TOPICAL
Qty: 30 PATCH | Refills: 3 | Status: CANCELLED | OUTPATIENT
Start: 2025-08-07

## 2025-08-07 RX ORDER — METFORMIN HYDROCHLORIDE 500 MG/1
500 TABLET ORAL
Qty: 90 TABLET | Refills: 0 | Status: CANCELLED | OUTPATIENT
Start: 2025-08-07

## 2025-08-07 NOTE — TELEPHONE ENCOUNTER
Spoke with Sharif Peoples her glucometer is ready for .    Spoke with patient.   She did not request glucometer from an outside provider.    Left message on the Med supply company canceled request

## 2025-08-07 NOTE — TELEPHONE ENCOUNTER
Copied from CRM #[id number]. Topic: Medications - Medication Authorization  >> Aug 7, 2025  9:34 AM Quiana wrote:  Type:  Pharmacy Calling to Clarify an RX    Name of Caller:Lucia  Pharmacy Name:[facility]  Prescription Name:diabetic testing supplies  What do they need to clarify?:status of prior auth form  Best Call Back Number:[phone]  Additional Information:

## 2025-08-08 RX ORDER — NYSTATIN 100000 [USP'U]/G
POWDER TOPICAL
Qty: 120 G | Refills: 11 | Status: SHIPPED | OUTPATIENT
Start: 2025-08-08

## 2025-08-08 RX ORDER — ATORVASTATIN CALCIUM 10 MG/1
10 TABLET, FILM COATED ORAL EVERY OTHER DAY
Qty: 45 TABLET | Refills: 3 | Status: SHIPPED | OUTPATIENT
Start: 2025-08-08

## 2025-08-15 ENCOUNTER — TELEPHONE (OUTPATIENT)
Dept: PRIMARY CARE CLINIC | Facility: CLINIC | Age: 75
End: 2025-08-15
Payer: MEDICARE

## 2025-08-15 ENCOUNTER — TELEPHONE (OUTPATIENT)
Dept: FAMILY MEDICINE | Facility: CLINIC | Age: 75
End: 2025-08-15
Payer: MEDICARE

## 2025-08-17 DIAGNOSIS — G47.00 INSOMNIA, UNSPECIFIED TYPE: ICD-10-CM

## 2025-08-18 ENCOUNTER — TELEPHONE (OUTPATIENT)
Dept: PRIMARY CARE CLINIC | Facility: CLINIC | Age: 75
End: 2025-08-18
Payer: MEDICARE

## 2025-08-18 DIAGNOSIS — G47.00 INSOMNIA, UNSPECIFIED TYPE: ICD-10-CM

## 2025-08-18 DIAGNOSIS — E11.42 TYPE 2 DIABETES MELLITUS WITH DIABETIC POLYNEUROPATHY, WITHOUT LONG-TERM CURRENT USE OF INSULIN: Primary | ICD-10-CM

## 2025-08-18 RX ORDER — DOXEPIN 3 MG/1
3 TABLET, FILM COATED ORAL NIGHTLY
Qty: 30 TABLET | Refills: 0 | Status: SHIPPED | OUTPATIENT
Start: 2025-08-18

## 2025-08-18 RX ORDER — DOXEPIN 3 MG/1
3 TABLET, FILM COATED ORAL NIGHTLY
Qty: 30 TABLET | Refills: 0 | OUTPATIENT
Start: 2025-08-18

## 2025-08-18 RX ORDER — TIRZEPATIDE 2.5 MG/.5ML
2.5 INJECTION, SOLUTION SUBCUTANEOUS
Qty: 2 ML | Refills: 0 | Status: SHIPPED | OUTPATIENT
Start: 2025-08-18 | End: 2025-11-16

## 2025-09-02 ENCOUNTER — TELEPHONE (OUTPATIENT)
Dept: PRIMARY CARE CLINIC | Facility: CLINIC | Age: 75
End: 2025-09-02
Payer: MEDICARE

## 2025-09-03 DIAGNOSIS — G47.00 INSOMNIA, UNSPECIFIED TYPE: ICD-10-CM

## 2025-09-03 RX ORDER — DOXEPIN 3 MG/1
3 TABLET, FILM COATED ORAL NIGHTLY
Qty: 30 TABLET | Refills: 0 | Status: SHIPPED | OUTPATIENT
Start: 2025-09-03

## 2025-09-04 DIAGNOSIS — R11.0 CHRONIC NAUSEA: ICD-10-CM

## 2025-09-05 RX ORDER — ONDANSETRON 8 MG/1
8 TABLET, FILM COATED ORAL EVERY 8 HOURS PRN
Qty: 20 TABLET | Refills: 0 | Status: SHIPPED | OUTPATIENT
Start: 2025-09-05 | End: 2025-09-12

## (undated) DEVICE — SHIELD EYE PLASTIC 3100G

## (undated) DEVICE — TIP I & A

## (undated) DEVICE — SYS LABEL CORRECT MED

## (undated) DEVICE — KNIFE ANGLE 1MM

## (undated) DEVICE — BLADE SURG BVL ANG COAX 2.4MM

## (undated) DEVICE — GLOVE SURG ULTRA TOUCH 7.5

## (undated) DEVICE — DRAPE OPTHALMIC W/POUCH

## (undated) DEVICE — SOL BETADINE 5%

## (undated) DEVICE — PACK CUSTOM EYE KIT

## (undated) DEVICE — CYSTOTOME IRRIG 24G 13MM